# Patient Record
Sex: MALE | Race: WHITE | Employment: OTHER | URBAN - METROPOLITAN AREA
[De-identification: names, ages, dates, MRNs, and addresses within clinical notes are randomized per-mention and may not be internally consistent; named-entity substitution may affect disease eponyms.]

---

## 2017-04-14 ENCOUNTER — TRANSFERRED RECORDS (OUTPATIENT)
Dept: HEALTH INFORMATION MANAGEMENT | Facility: CLINIC | Age: 57
End: 2017-04-14

## 2017-04-20 ENCOUNTER — MEDICAL CORRESPONDENCE (OUTPATIENT)
Dept: HEALTH INFORMATION MANAGEMENT | Facility: CLINIC | Age: 57
End: 2017-04-20

## 2017-04-20 ENCOUNTER — TRANSFERRED RECORDS (OUTPATIENT)
Dept: HEALTH INFORMATION MANAGEMENT | Facility: CLINIC | Age: 57
End: 2017-04-20

## 2017-04-25 DIAGNOSIS — C15.5 CANCER OF DISTAL THIRD OF ESOPHAGUS (H): Primary | ICD-10-CM

## 2017-04-25 PROCEDURE — 00000346 ZZHCL STATISTIC REVIEW OUTSIDE SLIDES TC 88321: Performed by: INTERNAL MEDICINE

## 2017-04-25 PROCEDURE — 88377 M/PHMTRC ALYS ISHQUANT/SEMIQ: CPT | Performed by: INTERNAL MEDICINE

## 2017-04-25 PROCEDURE — 00000159 ZZHCL STATISTIC H-SEND OUTS PREP: Performed by: INTERNAL MEDICINE

## 2017-04-25 PROCEDURE — 00000158 ZZHCL STATISTIC H-FISH PROCESS B/S: Performed by: INTERNAL MEDICINE

## 2017-04-25 PROCEDURE — 88341 IMHCHEM/IMCYTCHM EA ADD ANTB: CPT | Performed by: INTERNAL MEDICINE

## 2017-04-25 PROCEDURE — 88313 SPECIAL STAINS GROUP 2: CPT | Performed by: INTERNAL MEDICINE

## 2017-04-25 PROCEDURE — 88342 IMHCHEM/IMCYTCHM 1ST ANTB: CPT | Performed by: INTERNAL MEDICINE

## 2017-04-26 ASSESSMENT — ENCOUNTER SYMPTOMS
ORTHOPNEA: 0
LEG PAIN: 0
DISTURBANCES IN COORDINATION: 0
MUSCLE WEAKNESS: 1
MEMORY LOSS: 0
LIGHT-HEADEDNESS: 0
NAUSEA: 1
HYPERTENSION: 0
DIARRHEA: 1
PALPITATIONS: 1
SMELL DISTURBANCE: 0
BOWEL INCONTINENCE: 0
SINUS PAIN: 0
HEADACHES: 0
SLEEP DISTURBANCES DUE TO BREATHING: 0
SORE THROAT: 0
RECTAL PAIN: 0
HYPOTENSION: 0
NUMBNESS: 1
JAUNDICE: 0
JOINT SWELLING: 0
SINUS CONGESTION: 0
WEAKNESS: 0
LEG SWELLING: 1
HOARSE VOICE: 0
CLAUDICATION: 0
SEIZURES: 0
ARTHRALGIAS: 0
RECTAL BLEEDING: 0
MUSCLE CRAMPS: 0
NECK MASS: 0
NECK PAIN: 0
STIFFNESS: 1
DIZZINESS: 0
SPEECH CHANGE: 0
TACHYCARDIA: 0
HEARTBURN: 0
BLOOD IN STOOL: 0
CONSTIPATION: 0
MYALGIAS: 1
ABDOMINAL PAIN: 0
PARALYSIS: 0
TREMORS: 0
EXERCISE INTOLERANCE: 0
TINGLING: 1
TROUBLE SWALLOWING: 1
BACK PAIN: 1
TASTE DISTURBANCE: 0
BLOATING: 0
SYNCOPE: 0
LOSS OF CONSCIOUSNESS: 0
VOMITING: 1

## 2017-04-27 ENCOUNTER — HOSPITAL ENCOUNTER (OUTPATIENT)
Dept: PET IMAGING | Facility: CLINIC | Age: 57
End: 2017-04-27
Attending: INTERNAL MEDICINE
Payer: COMMERCIAL

## 2017-04-27 ENCOUNTER — HOSPITAL ENCOUNTER (OUTPATIENT)
Dept: PET IMAGING | Facility: CLINIC | Age: 57
Discharge: HOME | End: 2017-04-27
Attending: INTERNAL MEDICINE | Admitting: INTERNAL MEDICINE
Payer: COMMERCIAL

## 2017-04-27 ENCOUNTER — ONCOLOGY VISIT (OUTPATIENT)
Dept: ONCOLOGY | Facility: CLINIC | Age: 57
End: 2017-04-27
Attending: INTERNAL MEDICINE
Payer: COMMERCIAL

## 2017-04-27 VITALS
SYSTOLIC BLOOD PRESSURE: 123 MMHG | RESPIRATION RATE: 18 BRPM | WEIGHT: 315 LBS | TEMPERATURE: 97.3 F | OXYGEN SATURATION: 97 % | BODY MASS INDEX: 36.45 KG/M2 | HEART RATE: 70 BPM | HEIGHT: 78 IN | DIASTOLIC BLOOD PRESSURE: 86 MMHG

## 2017-04-27 DIAGNOSIS — C15.5 CANCER OF DISTAL THIRD OF ESOPHAGUS (H): ICD-10-CM

## 2017-04-27 DIAGNOSIS — C77.0 SECONDARY MALIGNANT NEOPLASM OF CERVICAL LYMPH NODE (H): ICD-10-CM

## 2017-04-27 DIAGNOSIS — C15.5 CANCER OF DISTAL THIRD OF ESOPHAGUS (H): Primary | ICD-10-CM

## 2017-04-27 LAB
ALBUMIN SERPL-MCNC: 3.5 G/DL (ref 3.4–5)
ALP SERPL-CCNC: 114 U/L (ref 40–150)
ALT SERPL W P-5'-P-CCNC: 26 U/L (ref 0–70)
ANION GAP SERPL CALCULATED.3IONS-SCNC: 7 MMOL/L (ref 3–14)
AST SERPL W P-5'-P-CCNC: 23 U/L (ref 0–45)
BASOPHILS # BLD AUTO: 0 10E9/L (ref 0–0.2)
BASOPHILS NFR BLD AUTO: 0.4 %
BILIRUB SERPL-MCNC: 0.6 MG/DL (ref 0.2–1.3)
BUN SERPL-MCNC: 11 MG/DL (ref 7–30)
CALCIUM SERPL-MCNC: 9 MG/DL (ref 8.5–10.1)
CHLORIDE SERPL-SCNC: 108 MMOL/L (ref 94–109)
CO2 SERPL-SCNC: 25 MMOL/L (ref 20–32)
CREAT SERPL-MCNC: 0.76 MG/DL (ref 0.66–1.25)
DIFFERENTIAL METHOD BLD: NORMAL
EOSINOPHIL # BLD AUTO: 0.1 10E9/L (ref 0–0.7)
EOSINOPHIL NFR BLD AUTO: 1.6 %
ERYTHROCYTE [DISTWIDTH] IN BLOOD BY AUTOMATED COUNT: 12.4 % (ref 10–15)
GFR SERPL CREATININE-BSD FRML MDRD: NORMAL ML/MIN/1.7M2
GLUCOSE BLDC GLUCOMTR-MCNC: 118 MG/DL (ref 70–99)
GLUCOSE SERPL-MCNC: 94 MG/DL (ref 70–99)
HCT VFR BLD AUTO: 44.8 % (ref 40–53)
HGB BLD-MCNC: 14.8 G/DL (ref 13.3–17.7)
IMM GRANULOCYTES # BLD: 0 10E9/L (ref 0–0.4)
IMM GRANULOCYTES NFR BLD: 0.4 %
LYMPHOCYTES # BLD AUTO: 1.3 10E9/L (ref 0.8–5.3)
LYMPHOCYTES NFR BLD AUTO: 22.8 %
MCH RBC QN AUTO: 28.7 PG (ref 26.5–33)
MCHC RBC AUTO-ENTMCNC: 33 G/DL (ref 31.5–36.5)
MCV RBC AUTO: 87 FL (ref 78–100)
MONOCYTES # BLD AUTO: 0.4 10E9/L (ref 0–1.3)
MONOCYTES NFR BLD AUTO: 6.9 %
NEUTROPHILS # BLD AUTO: 3.7 10E9/L (ref 1.6–8.3)
NEUTROPHILS NFR BLD AUTO: 67.9 %
NRBC # BLD AUTO: 0 10*3/UL
NRBC BLD AUTO-RTO: 0 /100
PLATELET # BLD AUTO: 236 10E9/L (ref 150–450)
POTASSIUM SERPL-SCNC: 4.4 MMOL/L (ref 3.4–5.3)
PROT SERPL-MCNC: 7.8 G/DL (ref 6.8–8.8)
RBC # BLD AUTO: 5.15 10E12/L (ref 4.4–5.9)
SODIUM SERPL-SCNC: 140 MMOL/L (ref 133–144)
WBC # BLD AUTO: 5.5 10E9/L (ref 4–11)

## 2017-04-27 PROCEDURE — 99205 OFFICE O/P NEW HI 60 MIN: CPT | Mod: ZP | Performed by: INTERNAL MEDICINE

## 2017-04-27 PROCEDURE — 70491 CT SOFT TISSUE NECK W/DYE: CPT

## 2017-04-27 PROCEDURE — 34300033 ZZH RX 343: Performed by: INTERNAL MEDICINE

## 2017-04-27 PROCEDURE — 80053 COMPREHEN METABOLIC PANEL: CPT | Performed by: INTERNAL MEDICINE

## 2017-04-27 PROCEDURE — 71260 CT THORAX DX C+: CPT

## 2017-04-27 PROCEDURE — A9552 F18 FDG: HCPCS | Performed by: INTERNAL MEDICINE

## 2017-04-27 PROCEDURE — 99212 OFFICE O/P EST SF 10 MIN: CPT | Mod: ZF,25

## 2017-04-27 PROCEDURE — 78816 PET IMAGE W/CT FULL BODY: CPT | Mod: PI

## 2017-04-27 PROCEDURE — 36415 COLL VENOUS BLD VENIPUNCTURE: CPT

## 2017-04-27 PROCEDURE — 82962 GLUCOSE BLOOD TEST: CPT

## 2017-04-27 PROCEDURE — 85025 COMPLETE CBC W/AUTO DIFF WBC: CPT | Performed by: INTERNAL MEDICINE

## 2017-04-27 PROCEDURE — 25500064 ZZH RX 255 OP 636: Performed by: INTERNAL MEDICINE

## 2017-04-27 RX ORDER — LATANOPROST 50 UG/ML
SOLUTION/ DROPS OPHTHALMIC AT BEDTIME
COMMUNITY
Start: 2015-05-30 | End: 2017-11-28

## 2017-04-27 RX ORDER — TIMOLOL MALEATE 5 MG/ML
SOLUTION/ DROPS OPHTHALMIC DAILY
COMMUNITY
Start: 2017-02-15 | End: 2021-08-12

## 2017-04-27 RX ORDER — IOPAMIDOL 755 MG/ML
10-140 INJECTION, SOLUTION INTRAVASCULAR ONCE
Status: COMPLETED | OUTPATIENT
Start: 2017-04-27 | End: 2017-04-27

## 2017-04-27 RX ADMIN — IOPAMIDOL 135 ML: 755 INJECTION, SOLUTION INTRAVENOUS at 08:26

## 2017-04-27 RX ADMIN — FLUDEOXYGLUCOSE F-18 17.96 MCI.: 500 INJECTION, SOLUTION INTRAVENOUS at 07:28

## 2017-04-27 ASSESSMENT — PAIN SCALES - GENERAL: PAINLEVEL: MILD PAIN (2)

## 2017-04-27 NOTE — MR AVS SNAPSHOT
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  Call member services at your health plan with any benefit or coverage questions.      Please bring the following with you to your appointment:    (1) Any X-Rays, CTs or MRIs which have been performed.  Contact the facility where they were done to arrange for  prior to your scheduled appointment.   (2) List of current medications  (3) This referral request   (4) Any documents/labs given to you for this referral                  Your next 10 appointments already scheduled     May 04, 2017  3:00 PM CDT   (Arrive by 2:45 PM)   Return Visit with MD JIMBO [facility] ([facility])    [address] [place] [id number] [phone]              Future tests that were ordered for you today     Open Future Orders        Priority Expected Expires Ordered    US Biopsy FNA Lymph Node ASAP  4/27/2018 4/27/2017    CT Soft Tissue Neck w Contrast Routine  4/26/2018 4/26/2017            Who to contact     If you have questions or need follow up information about today's clinic  "visit or your schedule please contact [facility] directly at [phone].  Normal or non-critical lab and imaging results will be communicated to you by MyChart, letter or phone within 4 business days after the clinic has received the results. If you do not hear from us within 7 days, please contact the clinic through [employer] or phone. If you have a critical or abnormal lab result, we will notify you by phone as soon as possible.  Submit refill requests through [employer] or call your pharmacy and they will forward the refill request to us. Please allow 3 business days for your refill to be completed.          Additional Information About Your Visit        [employer] Information     [employer] gives you secure access to your electronic health record. If you see a primary care provider, you can also send messages to your care team and make appointments. If you have questions, please call your primary care clinic.  If you do not have a primary care provider, please call [phone] and they will assist you.        Care EveryWhere ID     This is your Care EveryWhere ID. This could be used by other organizations to access your [place] medical records  [id number]        Your Vitals Were     Pulse Temperature Respirations Height Pulse Oximetry BMI (Body Mass Index)    70 97.3  F (36.3  C) (Tympanic) 18 1.981 m (6' 6\") 97% 43.1 kg/m2       Blood Pressure from Last 3 Encounters:   04/27/17 123/86    Weight from Last 3 Encounters:   04/27/17 (!) 169.2 kg (373 lb)              We Performed the Following     OTOLARYNGOLOGY REFERRAL        Primary Care Provider    Unknown Primary MD [place]       No address on file        Thank you!     Thank you for choosing [facility]  for your care. Our goal is always to provide you with excellent care. Hearing back from our patients is one way we can continue to improve our services. Please take a few minutes to complete the written survey that you may " receive in the mail after your visit with us. Thank you!             Your Updated Medication List - Protect others around you: Learn how to safely use, store and throw away your medicines at www.disposemymeds.org.          This list is accurate as of: 4/27/17  4:25 PM.  Always use your most recent med list.                   Brand Name Dispense Instructions for use    latanoprost 0.005 % ophthalmic solution    XALATAN     Place into both eyes At Bedtime       timolol 0.5 % ophthalmic solution    TIMOPTIC     Place into both eyes daily

## 2017-04-27 NOTE — PROGRESS NOTES
Oncology Initial visit:  Date on this visit: 4/27/2017    Reuben Padilla  is referred by Dr.Wayne JAVED Stevens for an oncology consultation. 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  He tells me that starting in February he started noticing dysphagia mainly for solids and it is slowly worsening.  He has noticed that he has to gulp water down to get anything down.  During this time, he has had occasional nausea and vomiting and occasional loose stools but denies any constipation.  He has lost about 10 pounds during this time.  Otherwise, he thinks that his energy has continued to be good.  Occasionally he has noticed that he feels an annoying feeling in his chest, especially after eating.  He denies that that is pain or heartburn.  He has not noticed any new infections, although in 12/2016 he had a right toe bunion surgery and after that it became infected and he had to be on antibiotics for 6 weeks for osteomyelitis and then his right toe was amputated in early February.  Apart from that, he denies any other infections.  He denies any difficulty breathing.  He has no bleeding.  He has not noticed any new lumps, bumps or any swellings anywhere.  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  He denies any hearing problems or tinnitus.  He denies any other focal neurological problems.  He has no new skin problems.  He had workup for dysphagia.  He had an EGD as well as colonoscopy done.  The EGD showed just above the GE junction there was an ulcerated area worrisome for malignant neoplasm.  The lesion itself is only 1-2 cm long and begins 40 cm from the lips.  There was no evidence of esophagitis.  The biopsy of  the esophageal lesion showed invasive poorly differentiated squamous cell carcinoma.  There was also mild chronic gastritis present in the gastric antral mucosa.  It was negative for H. pylori.  The colonoscopy was essentially unremarkable except 2 polyps were removed from the descending colon which were tubular adenomas and a single polyp was removed from the sigmoid colon which was also a tubular adenoma.  Because of the finding of the esophageal cancer, he was referred to the [facility].  The patient is originally from [place].         ROS:  A comprehensive ROS was otherwise neg      Past Medical/Surgical History:  No past medical history on file.  No past surgical history on file.  He has a history of glaucoma and obesity.  He had right big toe bunion surgery which resulted in osteomyelitis and then he had a right toe amputation in 02/2017.  He has a history of cholecystectomy in the 1990s and right arthroscopy in the past.         Allergies:  Allergies as of 04/27/2017 - In Progress 04/27/2017   Allergen Reaction Noted     Penicillin g Other (See Comments) 01/19/2017     Penicillins Unknown 04/27/2017     Current Medications:  Current Outpatient Prescriptions   Medication Sig Dispense Refill     latanoprost (XALATAN) 0.005 % ophthalmic solution Place into both eyes At Bedtime       timolol (TIMOPTIC) 0.5 % ophthalmic solution Place into both eyes daily        Family History:  No family history on file.   Paternal grandmother had breast cancer.  Paternal aunt had some gynecologic cancer.  He has one son who is healthy.         Social History:  Social History     Social History     Marital status: Single     Spouse name: N/A     Number of children: N/A     Years of education: N/A     Occupational History     Not on file.     Social History Main Topics     Smoking status: Never Smoker     Smokeless tobacco: Not on file     Alcohol use Not on file     Drug use: Not on file     Sexual activity: Not  "on file     Other Topics Concern     Not on file     Social History Narrative     No narrative on file   He used to smoke and has a 20-pack-year smoking history, but he quit 10 years ago.  He drinks alcohol occasionally.  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  He lives alone.       Physical Exam:  /86 (BP Location: Right arm)  Pulse 70  Temp 97.3  F (36.3  C) (Tympanic)  Resp 18  Ht 1.981 m (6' 6\")  Wt (!) 169.2 kg (373 lb)  SpO2 97%  BMI 43.1 kg/m2  CONSTITUTIONAL: no acute distress  EYES: PERRLA, no palor or icterus.   ENT/MOUTH: no mouth lesions. Oropharynx normal  CVS: s1s2 no m r g .   RESPIRATORY: clear to auscultation b/l  GI: soft non tender no hepatosplenomegaly  NEURO: AAOX3  Grossly non focal neuro exam  INTEGUMENT: no obvious rashes  LYMPHATIC: I did notice minimal fullness in the anterior cervical triangle but no discrete LAD noted. no palpable supraclavicular, axillary or inguinal LAD.   MUSCULOSKELETAL: Unremarkable. No bony tenderness.   EXTREMITIES: no edema. Amputated right big toe  PSYCH: Mentation, mood and affect are normal. Decision making capacity is intact    Laboratory/Imaging Studies  Results for orders placed or performed in visit on 04/27/17   *CBC with platelets differential   Result Value Ref Range    WBC 5.5 4.0 - 11.0 10e9/L    RBC Count 5.15 4.4 - 5.9 10e12/L    Hemoglobin 14.8 13.3 - 17.7 g/dL    Hematocrit 44.8 40.0 - 53.0 %    MCV 87 78 - 100 fl    MCH 28.7 26.5 - 33.0 pg    MCHC 33.0 31.5 - 36.5 g/dL    RDW 12.4 10.0 - 15.0 %    Platelet Count 236 150 - 450 10e9/L    Diff Method Automated Method     % Neutrophils 67.9 %    % Lymphocytes 22.8 %    % Monocytes 6.9 %    % Eosinophils 1.6 %    % Basophils 0.4 %    % Immature Granulocytes 0.4 %    Nucleated RBCs 0 0 /100    Absolute Neutrophil 3.7 1.6 - 8.3 10e9/L    Absolute Lymphocytes 1.3 0.8 - 5.3 10e9/L    Absolute Monocytes 0.4 0.0 - 1.3 10e9/L    Absolute Eosinophils 0.1 0.0 - 0.7 10e9/L "    Absolute Basophils 0.0 0.0 - 0.2 10e9/L    Abs Immature Granulocytes 0.0 0 - 0.4 10e9/L    Absolute Nucleated RBC 0.0    Comprehensive metabolic panel   Result Value Ref Range    Sodium 140 133 - 144 mmol/L    Potassium 4.4 3.4 - 5.3 mmol/L    Chloride 108 94 - 109 mmol/L    Carbon Dioxide 25 20 - 32 mmol/L    Anion Gap 7 3 - 14 mmol/L    Glucose 94 70 - 99 mg/dL    Urea Nitrogen 11 7 - 30 mg/dL    Creatinine 0.76 0.66 - 1.25 mg/dL    GFR Estimate >90  Non  GFR Calc   >60 mL/min/1.7m2    GFR Estimate If Black >90   GFR Calc   >60 mL/min/1.7m2    Calcium 9.0 8.5 - 10.1 mg/dL    Bilirubin Total 0.6 0.2 - 1.3 mg/dL    Albumin 3.5 3.4 - 5.0 g/dL    Protein Total 7.8 6.8 - 8.8 g/dL    Alkaline Phosphatase 114 40 - 150 U/L    ALT 26 0 - 70 U/L    AST 23 0 - 45 U/L       Imaging reviewed my self  PET/CT which was done today is very concerning for abnormally enlarged, hypermetabolic, cervical, mediastinal and retroperitoneal lymph nodes as well as multiple hepatic lesions which are also hypermetabolic.  This is all consistent with metastatic disease.  This is preliminary report.  Final report is pending at this time.         Path report reviewed    ASSESSMENT/PLAN:    Reuben is a 56-year-old male who started complaining of dysphagia in February and the workup showed an ulcerated, malignant ulcer about 1-2 cm in length 40 cm from the lip in the distal part of the esophagus.  It was invasive, poorly differentiated squamous cell carcinoma.  His PET scan is very concerning for metastatic disease involving multiple cervical, mediastinal and retroperitoneal lymph nodes.  He also has multiple hepatic hypermetabolic lesions consistent with metastatic disease.  I discussed with him in detail about this.  I told him that my concern is that he has metastatic disease which would be incurable.  At this time, it is hard for me to say whether he has primary esophageal cancer or if this started from  somewhere in the head and neck region.  I would like to do further investigation in terms of confirming the metastatic disease by doing a biopsy of a cervical lymph node.  I would also like him to be seen by ENT to do a proper exam to see if we can find any primary focus in the head and neck region.  I did also like his biopsy to be tested for HPV infection.  I would also like the biopsy to be tested for p16.  I did discuss with him that because of my concern for metastatic disease he would not be a surgical candidate nor would radiation be useful in his case and the treatment options would not be curative, but palliative, in nature and would consist of palliative chemotherapy.  At this time, he would like to get the biopsy done and then discuss the details about chemotherapy later.  I will try to schedule his biopsy as soon as possible and see him back in 1 week to go over the results and discuss further therapy options.       I also discussed with him the importance of maintaining good nutrition during this time.        We also discussed the importance of doing regular exercise to maintain general health.  This will also help him tolerate any treatments that we would offer him.       All of his and his family's questions were answered to their satisfaction.  Obviously this was not the news that they wanted to hear, but they understand the situation and they were very appreciative of our discussion.  He has our contact information so that if he has any questions or concerns in the interim he will give me a call.     Clemencia Carreon

## 2017-04-27 NOTE — NURSING NOTE
Chief Complaint   Patient presents with     Blood Draw     Labs only     Vitals and labs done peripherally.  See doc flow sheets for details.  Mercedes Mcfarland CMA

## 2017-04-27 NOTE — LETTER
4/27/2017       RE: Reuben Padilla  [address]   [facility] [address]     Dear Colleague,    Thank you for referring your patient, Reuben Padilla, to the [facility]. Please see a copy of my visit note below.    Oncology Initial visit:  Date on this visit: 4/27/2017    Reuben Padilla  is referred by Dr.Wayne JAVED Stevens for an oncology consultation. 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  He tells me that starting in February he started noticing dysphagia mainly for solids and it is slowly worsening.  He has noticed that he has to gulp water down to get anything down.  During this time, he has had occasional nausea and vomiting and occasional loose stools but denies any constipation.  He has lost about 10 pounds during this time.  Otherwise, he thinks that his energy has continued to be good.  Occasionally he has noticed that he feels an annoying feeling in his chest, especially after eating.  He denies that that is pain or heartburn.  He has not noticed any new infections, although in 12/2016 he had a right toe bunion surgery and after that it became infected and he had to be on antibiotics for 6 weeks for osteomyelitis and then his right toe was amputated in early February.  Apart from that, he denies any other infections.  He denies any difficulty breathing.  He has no bleeding.  He has not noticed any new lumps, bumps or any swellings anywhere.  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  He denies any hearing problems or tinnitus.  He denies any other focal neurological problems.  He has no new skin problems.  He had workup for dysphagia.  He had an EGD as well as colonoscopy  done.  The EGD showed just above the GE junction there was an ulcerated area worrisome for malignant neoplasm.  The lesion itself is only 1-2 cm long and begins 40 cm from the lips.  There was no evidence of esophagitis.  The biopsy of the esophageal lesion showed invasive poorly differentiated squamous cell carcinoma.  There was also mild chronic gastritis present in the gastric antral mucosa.  It was negative for H. pylori.  The colonoscopy was essentially unremarkable except 2 polyps were removed from the descending colon which were tubular adenomas and a single polyp was removed from the sigmoid colon which was also a tubular adenoma.  Because of the finding of the esophageal cancer, he was referred to the [facility].  The patient is originally from [place].         ROS:  A comprehensive ROS was otherwise neg      Past Medical/Surgical History:  No past medical history on file.  No past surgical history on file.  He has a history of glaucoma and obesity.  He had right big toe bunion surgery which resulted in osteomyelitis and then he had a right toe amputation in 02/2017.  He has a history of cholecystectomy in the 1990s and right arthroscopy in the past.         Allergies:  Allergies as of 04/27/2017 - In Progress 04/27/2017   Allergen Reaction Noted     Penicillin g Other (See Comments) 01/19/2017     Penicillins Unknown 04/27/2017     Current Medications:  Current Outpatient Prescriptions   Medication Sig Dispense Refill     latanoprost (XALATAN) 0.005 % ophthalmic solution Place into both eyes At Bedtime       timolol (TIMOPTIC) 0.5 % ophthalmic solution Place into both eyes daily        Family History:  No family history on file.   Paternal grandmother had breast cancer.  Paternal aunt had some gynecologic cancer.  He has one son who is healthy.         Social History:  Social History     Social History     Marital status: Single     Spouse name: N/A     Number of children: N/A     Years  "of education: N/A     Occupational History     Not on file.     Social History Main Topics     Smoking status: Never Smoker     Smokeless tobacco: Not on file     Alcohol use Not on file     Drug use: Not on file     Sexual activity: Not on file     Other Topics Concern     Not on file     Social History Narrative     No narrative on file   He used to smoke and has a 20-pack-year smoking history, but he quit 10 years ago.  He drinks alcohol occasionally.  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  He lives alone.       Physical Exam:  /86 (BP Location: Right arm)  Pulse 70  Temp 97.3  F (36.3  C) (Tympanic)  Resp 18  Ht 1.981 m (6' 6\")  Wt (!) 169.2 kg (373 lb)  SpO2 97%  BMI 43.1 kg/m2  CONSTITUTIONAL: no acute distress  EYES: PERRLA, no palor or icterus.   ENT/MOUTH: no mouth lesions. Oropharynx normal  CVS: s1s2 no m r g .   RESPIRATORY: clear to auscultation b/l  GI: soft non tender no hepatosplenomegaly  NEURO: AAOX3  Grossly non focal neuro exam  INTEGUMENT: no obvious rashes  LYMPHATIC: I did notice minimal fullness in the anterior cervical triangle but no discrete LAD noted. no palpable supraclavicular, axillary or inguinal LAD.   MUSCULOSKELETAL: Unremarkable. No bony tenderness.   EXTREMITIES: no edema. Amputated right big toe  PSYCH: Mentation, mood and affect are normal. Decision making capacity is intact    Laboratory/Imaging Studies  Results for orders placed or performed in visit on 04/27/17   *CBC with platelets differential   Result Value Ref Range    WBC 5.5 4.0 - 11.0 10e9/L    RBC Count 5.15 4.4 - 5.9 10e12/L    Hemoglobin 14.8 13.3 - 17.7 g/dL    Hematocrit 44.8 40.0 - 53.0 %    MCV 87 78 - 100 fl    MCH 28.7 26.5 - 33.0 pg    MCHC 33.0 31.5 - 36.5 g/dL    RDW 12.4 10.0 - 15.0 %    Platelet Count 236 150 - 450 10e9/L    Diff Method Automated Method     % Neutrophils 67.9 %    % Lymphocytes 22.8 %    % Monocytes 6.9 %    % Eosinophils 1.6 %    % Basophils 0.4 " %    % Immature Granulocytes 0.4 %    Nucleated RBCs 0 0 /100    Absolute Neutrophil 3.7 1.6 - 8.3 10e9/L    Absolute Lymphocytes 1.3 0.8 - 5.3 10e9/L    Absolute Monocytes 0.4 0.0 - 1.3 10e9/L    Absolute Eosinophils 0.1 0.0 - 0.7 10e9/L    Absolute Basophils 0.0 0.0 - 0.2 10e9/L    Abs Immature Granulocytes 0.0 0 - 0.4 10e9/L    Absolute Nucleated RBC 0.0    Comprehensive metabolic panel   Result Value Ref Range    Sodium 140 133 - 144 mmol/L    Potassium 4.4 3.4 - 5.3 mmol/L    Chloride 108 94 - 109 mmol/L    Carbon Dioxide 25 20 - 32 mmol/L    Anion Gap 7 3 - 14 mmol/L    Glucose 94 70 - 99 mg/dL    Urea Nitrogen 11 7 - 30 mg/dL    Creatinine 0.76 0.66 - 1.25 mg/dL    GFR Estimate >90  Non  GFR Calc   >60 mL/min/1.7m2    GFR Estimate If Black >90   GFR Calc   >60 mL/min/1.7m2    Calcium 9.0 8.5 - 10.1 mg/dL    Bilirubin Total 0.6 0.2 - 1.3 mg/dL    Albumin 3.5 3.4 - 5.0 g/dL    Protein Total 7.8 6.8 - 8.8 g/dL    Alkaline Phosphatase 114 40 - 150 U/L    ALT 26 0 - 70 U/L    AST 23 0 - 45 U/L       Imaging reviewed my self  PET/CT which was done today is very concerning for abnormally enlarged, hypermetabolic, cervical, mediastinal and retroperitoneal lymph nodes as well as multiple hepatic lesions which are also hypermetabolic.  This is all consistent with metastatic disease.  This is preliminary report.  Final report is pending at this time.         Path report reviewed    ASSESSMENT/PLAN:    Reuben is a 56-year-old male who started complaining of dysphagia in February and the workup showed an ulcerated, malignant ulcer about 1-2 cm in length 40 cm from the lip in the distal part of the esophagus.  It was invasive, poorly differentiated squamous cell carcinoma.  His PET scan is very concerning for metastatic disease involving multiple cervical, mediastinal and retroperitoneal lymph nodes.  He also has multiple hepatic hypermetabolic lesions consistent with metastatic disease.  I  discussed with him in detail about this.  I told him that my concern is that he has metastatic disease which would be incurable.  At this time, it is hard for me to say whether he has primary esophageal cancer or if this started from somewhere in the head and neck region.  I would like to do further investigation in terms of confirming the metastatic disease by doing a biopsy of a cervical lymph node.  I would also like him to be seen by ENT to do a proper exam to see if we can find any primary focus in the head and neck region.  I did also like his biopsy to be tested for HPV infection.  I would also like the biopsy to be tested for p16.  I did discuss with him that because of my concern for metastatic disease he would not be a surgical candidate nor would radiation be useful in his case and the treatment options would not be curative, but palliative, in nature and would consist of palliative chemotherapy.  At this time, he would like to get the biopsy done and then discuss the details about chemotherapy later.  I will try to schedule his biopsy as soon as possible and see him back in 1 week to go over the results and discuss further therapy options.       I also discussed with him the importance of maintaining good nutrition during this time.        We also discussed the importance of doing regular exercise to maintain general health.  This will also help him tolerate any treatments that we would offer him.       All of his and his family's questions were answered to their satisfaction.  Obviously this was not the news that they wanted to hear, but they understand the situation and they were very appreciative of our discussion.  He has our contact information so that if he has any questions or concerns in the interim he will give me a call.     Clemencia Carreon            Again, thank you for allowing me to participate in the care of your patient.      Sincerely,    Clemencia Carreon MD

## 2017-04-27 NOTE — LETTER
Date:May 3, 2017      Patient was self referred, no letter generated. Do not send.        [facility] Health Information

## 2017-04-27 NOTE — PATIENT INSTRUCTIONS
Please schedule ENT referral ASAP  Please schedule Biopsy of the lymph node asap    See me back in 1 week

## 2017-04-27 NOTE — NURSING NOTE
"Oncology Rooming Note    April 27, 2017 2:04 PM   Reuben Padilla is a 52 year old male who presents for: Oncology Clinic Visit    Initial Vitals: /86 (BP Location: Right arm)  Pulse 70  Temp 97.3  F (36.3  C) (Tympanic)  Resp 18  Ht 1.981 m (6' 6\")  Wt (!) 169.2 kg (373 lb)  SpO2 97%  BMI 43.1 kg/m2 Estimated body mass index is 43.1 kg/(m^2) as calculated from the following:    Height as of this encounter: 1.981 m (6' 6\").    Weight as of this encounter: 169.2 kg (373 lb). Body surface area is 3.05 meters squared.  Mild Pain (2) Comment: Data Unavailable   No LMP for male patient.  Allergies reviewed: Yes  Medications reviewed: Yes    Medications: Medication refills not needed today.  Pharmacy name entered into EPIC: Data Unavailable    Clinical concerns: provider was notified.    7 minutes for nursing intake (face to face time)     Anat Ortega CMA                "

## 2017-04-28 ENCOUNTER — TELEPHONE (OUTPATIENT)
Dept: NURSING | Facility: CLINIC | Age: 57
End: 2017-04-28

## 2017-04-28 ENCOUNTER — TELEPHONE (OUTPATIENT)
Dept: INTERVENTIONAL RADIOLOGY/VASCULAR | Facility: CLINIC | Age: 57
End: 2017-04-28

## 2017-04-28 ASSESSMENT — ENCOUNTER SYMPTOMS
NUMBNESS: 1
NAUSEA: 1
JOINT SWELLING: 0
SINUS CONGESTION: 0
JAUNDICE: 0
SINUS PAIN: 0
TINGLING: 0
MEMORY LOSS: 0
DISTURBANCES IN COORDINATION: 0
NECK PAIN: 0
HEADACHES: 1
DIZZINESS: 0
MYALGIAS: 1
TASTE DISTURBANCE: 0
HOARSE VOICE: 0
ABDOMINAL PAIN: 0
STIFFNESS: 0
LOSS OF CONSCIOUSNESS: 0
RECTAL BLEEDING: 0
SPEECH CHANGE: 0
RECTAL PAIN: 0
MUSCLE CRAMPS: 1
SEIZURES: 0
MUSCLE WEAKNESS: 1
SMELL DISTURBANCE: 0
TROUBLE SWALLOWING: 1
CONSTIPATION: 0
BLOOD IN STOOL: 0
WEAKNESS: 0
PARALYSIS: 0
BOWEL INCONTINENCE: 0
SORE THROAT: 0
BLOATING: 0
TREMORS: 0
ARTHRALGIAS: 1
BACK PAIN: 0
VOMITING: 1
NECK MASS: 0
HEARTBURN: 0
DIARRHEA: 1

## 2017-04-28 NOTE — TELEPHONE ENCOUNTER
When is the appointment for imaging biopsy for his lymph nodes? Please call. No one called him about it.  Susana Enriquez RN-[facility]

## 2017-04-29 LAB — COPATH REPORT: NORMAL

## 2017-05-01 ENCOUNTER — APPOINTMENT (OUTPATIENT)
Dept: LAB | Facility: CLINIC | Age: 57
End: 2017-05-01
Attending: INTERNAL MEDICINE
Payer: COMMERCIAL

## 2017-05-02 ENCOUNTER — APPOINTMENT (OUTPATIENT)
Dept: MEDSURG UNIT | Facility: CLINIC | Age: 57
End: 2017-05-02
Attending: INTERNAL MEDICINE
Payer: COMMERCIAL

## 2017-05-02 ENCOUNTER — APPOINTMENT (OUTPATIENT)
Dept: INTERVENTIONAL RADIOLOGY/VASCULAR | Facility: CLINIC | Age: 57
End: 2017-05-02
Attending: INTERNAL MEDICINE
Payer: COMMERCIAL

## 2017-05-02 ENCOUNTER — HOSPITAL ENCOUNTER (OUTPATIENT)
Facility: CLINIC | Age: 57
Discharge: HOME | End: 2017-05-02
Attending: INTERNAL MEDICINE | Admitting: INTERNAL MEDICINE
Payer: COMMERCIAL

## 2017-05-02 VITALS
DIASTOLIC BLOOD PRESSURE: 60 MMHG | SYSTOLIC BLOOD PRESSURE: 100 MMHG | RESPIRATION RATE: 18 BRPM | TEMPERATURE: 97.9 F | HEART RATE: 90 BPM | OXYGEN SATURATION: 98 %

## 2017-05-02 DIAGNOSIS — C15.5 CANCER OF DISTAL THIRD OF ESOPHAGUS (H): ICD-10-CM

## 2017-05-02 DIAGNOSIS — C77.0 SECONDARY MALIGNANT NEOPLASM OF CERVICAL LYMPH NODE (H): ICD-10-CM

## 2017-05-02 LAB — INR BLD: 1 (ref 0.86–1.14)

## 2017-05-02 PROCEDURE — 27210903 ZZH KIT CR5

## 2017-05-02 PROCEDURE — 25000128 H RX IP 250 OP 636: Performed by: RADIOLOGY

## 2017-05-02 PROCEDURE — 25000128 H RX IP 250 OP 636: Performed by: INTERNAL MEDICINE

## 2017-05-02 PROCEDURE — 40000166 ZZH STATISTIC PP CARE STAGE 1

## 2017-05-02 PROCEDURE — 88173 CYTOPATH EVAL FNA REPORT: CPT | Performed by: INTERNAL MEDICINE

## 2017-05-02 PROCEDURE — 88342 IMHCHEM/IMCYTCHM 1ST ANTB: CPT | Performed by: INTERNAL MEDICINE

## 2017-05-02 PROCEDURE — 25000125 ZZHC RX 250: Performed by: STUDENT IN AN ORGANIZED HEALTH CARE EDUCATION/TRAINING PROGRAM

## 2017-05-02 PROCEDURE — 85610 PROTHROMBIN TIME: CPT | Mod: QW

## 2017-05-02 PROCEDURE — 25000128 H RX IP 250 OP 636: Performed by: STUDENT IN AN ORGANIZED HEALTH CARE EDUCATION/TRAINING PROGRAM

## 2017-05-02 PROCEDURE — 27210995 ZZH RX 272: Performed by: STUDENT IN AN ORGANIZED HEALTH CARE EDUCATION/TRAINING PROGRAM

## 2017-05-02 PROCEDURE — 88172 CYTP DX EVAL FNA 1ST EA SITE: CPT | Performed by: INTERNAL MEDICINE

## 2017-05-02 PROCEDURE — 88341 IMHCHEM/IMCYTCHM EA ADD ANTB: CPT | Performed by: INTERNAL MEDICINE

## 2017-05-02 PROCEDURE — 00000155 ZZHCL STATISTIC H-CELL BLOCK W/STAIN: Performed by: INTERNAL MEDICINE

## 2017-05-02 PROCEDURE — 27210732 IR LYMPH NODE BIOPSY

## 2017-05-02 PROCEDURE — 93005 ELECTROCARDIOGRAM TRACING: CPT

## 2017-05-02 PROCEDURE — 88305 TISSUE EXAM BY PATHOLOGIST: CPT | Performed by: INTERNAL MEDICINE

## 2017-05-02 RX ORDER — NALOXONE HYDROCHLORIDE 0.4 MG/ML
.1-.4 INJECTION, SOLUTION INTRAMUSCULAR; INTRAVENOUS; SUBCUTANEOUS
Status: DISCONTINUED | OUTPATIENT
Start: 2017-05-02 | End: 2017-05-02 | Stop reason: HOSPADM

## 2017-05-02 RX ORDER — LIDOCAINE 40 MG/G
CREAM TOPICAL
Status: DISCONTINUED | OUTPATIENT
Start: 2017-05-02 | End: 2017-05-02 | Stop reason: HOSPADM

## 2017-05-02 RX ORDER — SODIUM CHLORIDE 9 MG/ML
INJECTION, SOLUTION INTRAVENOUS CONTINUOUS
Status: DISCONTINUED | OUTPATIENT
Start: 2017-05-02 | End: 2017-05-02 | Stop reason: HOSPADM

## 2017-05-02 RX ORDER — MULTIPLE VITAMINS W/ MINERALS TAB 9MG-400MCG
1 TAB ORAL DAILY
COMMUNITY
End: 2021-06-07

## 2017-05-02 RX ORDER — FENTANYL CITRATE 50 UG/ML
25-50 INJECTION, SOLUTION INTRAMUSCULAR; INTRAVENOUS EVERY 5 MIN PRN
Status: DISCONTINUED | OUTPATIENT
Start: 2017-05-02 | End: 2017-05-02 | Stop reason: HOSPADM

## 2017-05-02 RX ORDER — FLUMAZENIL 0.1 MG/ML
0.2 INJECTION, SOLUTION INTRAVENOUS
Status: DISCONTINUED | OUTPATIENT
Start: 2017-05-02 | End: 2017-05-02 | Stop reason: HOSPADM

## 2017-05-02 RX ORDER — ONDANSETRON 2 MG/ML
4 INJECTION INTRAMUSCULAR; INTRAVENOUS ONCE
Status: COMPLETED | OUTPATIENT
Start: 2017-05-02 | End: 2017-05-02

## 2017-05-02 RX ORDER — CHLORAL HYDRATE 500 MG
2 CAPSULE ORAL DAILY
COMMUNITY
End: 2018-11-13

## 2017-05-02 RX ADMIN — FENTANYL CITRATE 50 MCG: 50 INJECTION, SOLUTION INTRAMUSCULAR; INTRAVENOUS at 11:35

## 2017-05-02 RX ADMIN — ONDANSETRON 4 MG: 2 INJECTION INTRAMUSCULAR; INTRAVENOUS at 11:44

## 2017-05-02 RX ADMIN — LIDOCAINE HYDROCHLORIDE 15 ML: 10 INJECTION, SOLUTION EPIDURAL; INFILTRATION; INTRACAUDAL; PERINEURAL at 11:34

## 2017-05-02 RX ADMIN — SODIUM CHLORIDE: 9 INJECTION, SOLUTION INTRAVENOUS at 09:49

## 2017-05-02 RX ADMIN — MIDAZOLAM 1 MG: 1 INJECTION INTRAMUSCULAR; INTRAVENOUS at 11:34

## 2017-05-02 NOTE — PROGRESS NOTES
0955 Pt on 2a prepped and ready for lymph node biopsy. PIV placed. INR 1.0 per ismaryan, H&P current. Family at . Pt ready for consent.

## 2017-05-02 NOTE — IP AVS SNAPSHOT
[address] [address]                                       After Visit Summary   5/2/2017    Reuben Padilla    MRN: [id number]           After Visit Summary Signature Page     I have received my discharge instructions, and my questions have been answered. I have discussed any challenges I see with this plan with the nurse or doctor.    ..........................................................................................................................................  Patient/Patient Representative Signature      ..........................................................................................................................................  Patient Representative Print Name and Relationship to Patient    ..................................................               ................................................  Date                                            Time    ..........................................................................................................................................  Reviewed by Signature/Title    ...................................................              ..............................................  Date                                                            Time

## 2017-05-02 NOTE — IP AVS SNAPSHOT
MRN:[id number]                      After Visit Summary   5/2/2017    Reuben Padilla    MRN: [id number]           Visit Information        Department      5/2/2017  9:12 AM [address] [facility]          Review of your medicines      UNREVIEWED medicines. Ask your doctor about these medicines        Dose / Directions    fish oil-omega-3 fatty acids 1000 MG capsule        Dose:  2 g   Take 2 g by mouth daily   Refills:  0       IBUPROFEN PO        Dose:  800 mg   Take 800 mg by mouth every 8 hours as needed for moderate pain   Refills:  0       latanoprost 0.005 % ophthalmic solution   Commonly known as:  XALATAN   Used for:  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)        Place into both eyes At Bedtime   Refills:  0       Multi-vitamin Tabs tablet        Dose:  1 tablet   Take 1 tablet by mouth daily   Refills:  0       timolol 0.5 % ophthalmic solution   Commonly known as:  TIMOPTIC   Used for:  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)        Place into both eyes daily   Refills:  0                Protect others around you: Learn how to safely use, store and throw away your medicines at www.disposemymeds.org.         Follow-ups after your visit        Your next 10 appointments already scheduled     May 08, 2017  8:00 AM CDT   (Arrive by 7:45 AM)   New Patient Visit with Kadi Dee MD   [facility] ([facility])    [address] [place] [id number] [phone]            May 12, 2017  4:00 PM CDT   (Arrive by 3:45 PM)   New Patient Visit with Duran Ackerman MD   [facility] ([facility])    [address] [place] [id number] [phone]               Care Instructions        Further instructions from your care team       [facility]    Interventional Radiology  Patient Instructions Following Biopsy    AFTER YOU GO HOME  ? If you were  given sedation DO NOT drive or operate machinery at home or at work for at least 24 hours  ? DO relax and take it easy for 48 hours, no strenuous activity for 24 hours  ? DO drink plenty of fluids  ? DO resume your regular diet, unless otherwise instructed by your Primary Physician  ? Keep the dressing dry and in place for 24 hours.  ? DO NOT SMOKE FOR AT LEAST 24 HOURS, if you have been given any medications that were to help you relax or sedate you during your procedure  ? DO NOT drink alcoholic beverages the day of your procedure  ? DO NOT do any strenuous exercise or lifting (> 10 lbs) for at least 3 days following your procedure  ? DO NOT take a bath or shower for at least 12 hours following your procedure  ? Remove dressing after shower the next day. Replace with Band aid for 2 days.  Never leave a wet dressing in place.  ? DO NOT make any important or legal decisions for 24 hours following your procedure  ? There should be minimum drainage from the biopsy site    CALL THE PHYSICIAN IF:  ? You start bleeding from the procedure site.  If you do start to bleed from that site, lie down flat and hold pressure on the site for a minimum of 10 minutes.  Your physician will tell you if you need to return to the hospital  ? You develop nausea or vomiting  ? You have excessive swelling, redness, or tenderness at the site  ? You have drainage that looks like it is infected.  ? You experience severe pain  ? You develop hives or a rash or unexplained itching  ? You develop shortness of breath  ? You develop a temperature of 101 degrees F or greater      [facility] INTERVENTIONAL RADIOLOGY DEPARTMENT  Procedure Physician:         Dr. Ashton                     Date of procedure: May 2, 2017  Telephone Numbers: [phone] Monday-Friday 8:00 am to 4:30 pm  [phone] After 4:30 pm Monday-Friday, Weekends & Holidays.   Ask for the Neuro-Radiologist on call.  Someone is on call 24 hrs/day  [facility] toll free  number: [phone] Monday-Friday 8:00 am to 4:30 pm  [facility] Emergency Dept: [phone]           Additional Information About Your Visit        [facility] Information     [employer] gives you secure access to your electronic health record. If you see a primary care provider, you can also send messages to your care team and make appointments. If you have questions, please call your primary care clinic.  If you do not have a primary care provider, please call [phone] and they will assist you.        Care EveryWhere ID     This is your Care EveryWhere ID. This could be used by other organizations to access your [facility] medical records  [id number]        Your Vitals Were     Blood Pressure Pulse Temperature Respirations Pulse Oximetry       99/68 91 97.9  F (36.6  C) (Oral) 14 96%        Primary Care Provider    Unknown Primary MD Sydney      Thank you!     Thank you for choosing [facility] for your care. Our goal is always to provide you with excellent care. Hearing back from our patients is one way we can continue to improve our services. Please take a few minutes to complete the written survey that you may receive in the mail after you visit with us. Thank you!             Medication List: This is a list of all your medications and when to take them. Check marks below indicate your daily home schedule. Keep this list as a reference.      Medications           Morning Afternoon Evening Bedtime As Needed    fish oil-omega-3 fatty acids 1000 MG capsule   Take 2 g by mouth daily                                IBUPROFEN PO   Take 800 mg by mouth every 8 hours as needed for moderate pain                                latanoprost 0.005 % ophthalmic solution   Commonly known as:  XALATAN   Place into both eyes At Bedtime                                Multi-vitamin Tabs tablet   Take 1 tablet by mouth daily                                timolol 0.5 % ophthalmic solution   Commonly known as:  TIMOPTIC   Place into both  eyes daily

## 2017-05-02 NOTE — DISCHARGE INSTRUCTIONS
[facility]    Interventional Radiology  Patient Instructions Following Biopsy    AFTER YOU GO HOME  ? If you were given sedation DO NOT drive or operate machinery at home or at work for at least 24 hours  ? DO relax and take it easy for 48 hours, no strenuous activity for 24 hours  ? DO drink plenty of fluids  ? DO resume your regular diet, unless otherwise instructed by your Primary Physician  ? Keep the dressing dry and in place for 24 hours.  ? DO NOT SMOKE FOR AT LEAST 24 HOURS, if you have been given any medications that were to help you relax or sedate you during your procedure  ? DO NOT drink alcoholic beverages the day of your procedure  ? DO NOT do any strenuous exercise or lifting (> 10 lbs) for at least 3 days following your procedure  ? DO NOT take a bath or shower for at least 12 hours following your procedure  ? Remove dressing after shower the next day. Replace with Band aid for 2 days.  Never leave a wet dressing in place.  ? DO NOT make any important or legal decisions for 24 hours following your procedure  ? There should be minimum drainage from the biopsy site    CALL THE PHYSICIAN IF:  ? You start bleeding from the procedure site.  If you do start to bleed from that site, lie down flat and hold pressure on the site for a minimum of 10 minutes.  Your physician will tell you if you need to return to the hospital  ? You develop nausea or vomiting  ? You have excessive swelling, redness, or tenderness at the site  ? You have drainage that looks like it is infected.  ? You experience severe pain  ? You develop hives or a rash or unexplained itching  ? You develop shortness of breath  ? You develop a temperature of 101 degrees F or greater      [facility] INTERVENTIONAL RADIOLOGY DEPARTMENT  Procedure Physician:         Dr. Ashton                     Date of procedure: May 2, 2017  Telephone Numbers: [phone] Monday-Friday 8:00 am to 4:30 pm  [phone] After 4:30 pm Monday-Friday,  Weekends & Holidays.   Ask for the Neuro-Radiologist on call.  Someone is on call 24 hrs/day  [facility] toll free number: [phone] Monday-Friday 8:00 am to 4:30 pm  [facility] Emergency Dept: [phone]

## 2017-05-02 NOTE — PROGRESS NOTES
1150 Pt arrived on 2a post lymph node biopsy. VSS RA. Dressing c/d/i. No pain. Family at BS. 1200 Pt eating and drinking.

## 2017-05-02 NOTE — PROGRESS NOTES
Interventional Radiology Intra-procedural Nursing Note    Patient Name: Reuben Padilla  Medical Record Number: [id number]  Today's Date: May 2, 2017         Start Time: 1100  End of procedure time: 1130  Procedure: Left supraclavicular lymph node biopsy  Fire Safety Score: 2    Consent Review/Timeout Performed by: Dr. Derick Ashton  Procedure Performed By: Dr. Derick Ashton    Fentanyl administered: 50 mcgs  Versed administered: 1 mg    Start of Sedation Time: 1100  End of Sedation Time: 1130  Total Sedation Time: 30 minutes    Report given to: Trinh PRITCHARD  Time pt departs:  1145      Other Notes:  Alert male transported via cart from 2A room 13 to IR Procedure Room 7 for planned intervention.  ID band confirmed and patient acknowledges understanding of planned procedure. Patient  positioned supine.  Patient prepped and draped per policy see VS flowsheet, MAR for further information.       Pathology contacted with request for presence at 1110.  Pathology present for sample evaluation at 1120.    A total of 3 x fine needle specimen obtained under sterile procedure.  Samples provided to on-site Pathology Staff for evaluation.    Post procedure, site cleansed and dressed per protocol.    Immediately post-procedure, patient c/o dizziness, nausea.  SBP 80, rhythm slowing to low 50's.  Patient diaphoretic.  Physician notified and present at bedside.  Patient placed in trendelenburg, 4 mg IV Zofran given.  S/P treatment, SBP now 108/71.  Patient confirms he is feeling better.    Patient condition post procedure is stable.   Patient returned to 2A for post-procedure monitoring.    Anat Pruitt RN

## 2017-05-02 NOTE — PROGRESS NOTES
Interventional Radiology Pre-Procedure Sedation Assessment   Time of Assessment: 10:12 AM    Expected Level: Moderate Sedation    Indication: Sedation is required for the following type of Procedure: Biopsy    Sedation and procedural consent: Risks, benefits and alternatives were discussed with Patient    PO Intake: Appropriately NPO for procedure    ASA Class: Class 2 - MILD SYSTEMIC DISEASE, NO ACUTE PROBLEMS, NO FUNCTIONAL LIMITATIONS.    Mallampati: Grade 1:  Soft palate, uvula, tonsillar pillars, and posterior pharyngeal wall visible    Lungs: Lungs Clear with good breath sounds bilaterally    Heart: Normal heart sounds and rate    History and physical reviewed and no updates needed. I have reviewed the lab findings, diagnostic data, medications, and the plan for sedation. I have determined this patient to be an appropriate candidate for the planned sedation and procedure and have reassessed the patient IMMEDIATELY PRIOR to sedation and procedure.    Debbie Granados MD

## 2017-05-02 NOTE — PLAN OF CARE
IV discontinued. DC paperwork reviewed with patient and his sister. Biopsy site CDI no bleeding. Escorted to lobby with his sister for transport home.

## 2017-05-02 NOTE — PROGRESS NOTES
Interventional Radiology Brief Post Procedure Note    Procedure: @FVRISFRMTLINK([id number])@    Proceduralist: Dr. Ashton    Assistant: Debbie Granados MD and None    Time Out: Prior to the start of the procedure and with procedural staff participation, I verbally confirmed the patient s identity using two indicators, relevant allergies, that the procedure was appropriate and matched the consent or emergent situation, and that the correct equipment/implants were available. Immediately prior to starting the procedure I conducted the Time Out with the procedural staff and re-confirmed the patient s name, procedure, and site/side. (The Joint Commission universal protocol was followed.)  Yes    Sedation: IR Nurse Monitored Care   Post Procedure Summary:  Prior to the start of the procedure and with procedural staff participation, I verbally confirmed the patient s identity using two indicators, relevant allergies, that the procedure was appropriate and matched the consent or emergent situation, and that the correct equipment/implants were available. Immediately prior to starting the procedure I conducted the Time Out with the procedural staff and re-confirmed the patient s name, procedure, and site/side. (The Joint Commission universal protocol was followed.)  Yes       Sedatives: Fentanyl and Midazolam (Versed)    Vital signs, airway and pulse oximetry were monitored and remained stable throughout the procedure and sedation was maintained until the procedure was complete.  The patient was monitored by staff until sedation discharge criteria were met.    Patient tolerance: Patient tolerated the procedure well with no immediate complications.    Time of sedation in minutes: 30 min minutes from beginning to end of physician one to one monitoring.        Findings: Four FNA samples obtained from left supraclavicular lymph node.    Estimated Blood Loss: Minimal    Fluoroscopy Time: Not applicable    SPECIMENS: Fine needle aspirate  for cytological analysis    Complications: 1. None     Condition: Stable    Plan: Patient to return to  to recover.    Comments: See dictated procedure note for full details.    Debbie Granados MD

## 2017-05-03 LAB — INTERPRETATION ECG - MUSE: NORMAL

## 2017-05-04 LAB — COPATH REPORT: NORMAL

## 2017-05-08 ENCOUNTER — RADIANT APPOINTMENT (OUTPATIENT)
Dept: CARDIOLOGY | Facility: CLINIC | Age: 57
End: 2017-05-08
Attending: INTERNAL MEDICINE

## 2017-05-08 ENCOUNTER — ANESTHESIA EVENT (OUTPATIENT)
Dept: SURGERY | Facility: AMBULATORY SURGERY CENTER | Age: 57
End: 2017-05-08

## 2017-05-08 ENCOUNTER — ONCOLOGY VISIT (OUTPATIENT)
Dept: ONCOLOGY | Facility: CLINIC | Age: 57
End: 2017-05-08
Attending: INTERNAL MEDICINE
Payer: COMMERCIAL

## 2017-05-08 VITALS
OXYGEN SATURATION: 96 % | HEART RATE: 64 BPM | DIASTOLIC BLOOD PRESSURE: 86 MMHG | TEMPERATURE: 98 F | WEIGHT: 315 LBS | SYSTOLIC BLOOD PRESSURE: 132 MMHG | RESPIRATION RATE: 16 BRPM | BODY MASS INDEX: 36.45 KG/M2 | HEIGHT: 78 IN

## 2017-05-08 DIAGNOSIS — C15.5 CANCER OF DISTAL THIRD OF ESOPHAGUS (H): ICD-10-CM

## 2017-05-08 DIAGNOSIS — G47.09 OTHER INSOMNIA: ICD-10-CM

## 2017-05-08 DIAGNOSIS — C77.0 METASTASIS TO HEAD AND NECK LYMPH NODE (H): ICD-10-CM

## 2017-05-08 DIAGNOSIS — C15.5 CANCER OF DISTAL THIRD OF ESOPHAGUS (H): Primary | ICD-10-CM

## 2017-05-08 PROCEDURE — 99215 OFFICE O/P EST HI 40 MIN: CPT | Mod: ZP | Performed by: INTERNAL MEDICINE

## 2017-05-08 PROCEDURE — 99212 OFFICE O/P EST SF 10 MIN: CPT | Mod: ZF

## 2017-05-08 RX ORDER — LORAZEPAM 0.5 MG/1
0.5 TABLET ORAL EVERY 6 HOURS PRN
Qty: 60 TABLET | Refills: 1 | Status: SHIPPED | OUTPATIENT
Start: 2017-05-08 | End: 2018-01-15

## 2017-05-08 RX ORDER — ZOLPIDEM TARTRATE 5 MG/1
TABLET ORAL
Qty: 60 TABLET | Refills: 1 | Status: SHIPPED | OUTPATIENT
Start: 2017-05-08 | End: 2017-07-10

## 2017-05-08 RX ADMIN — Medication 3 ML: at 12:00

## 2017-05-08 ASSESSMENT — PAIN SCALES - GENERAL: PAINLEVEL: NO PAIN (0)

## 2017-05-08 NOTE — LETTER
5/8/2017       RE: Reuben Padilla  PO   [facility] [address]     Dear Colleague,    Thank you for referring your patient, Reuben Padilla, to the [facility]. Please see a copy of my visit note below.    Oncology Consultation:  Date on this visit: 5/8/2017    Reuben Padilla  is referred by Dr.Wayne JAVED Stevens for an oncology consultation. He requires evaluation for new diagnosis of     Primary Physician: Primary Dr, Unknown     History Of Present Illness:  Mr. Padilla is a 56 year old male who presents with new diagnosis of metastatic esophageal cancer.  HISTORY OF PRESENT ILLNESS:    The patient reports that initially he noticed symptoms of dysphagia, mainly to solid food, starting in February of 2017.  Symptoms got progressively worse.  Currently, he is unable to eat solid food, and he needs to take a lot of fluid to push food down.  His dysphagia symptoms prompted subsequent evaluation with upper endoscopy and colonoscopy that was done in [place]. The colonoscopy revealed two tubular adenomas in the colon.  The upper endoscopy revealed an ulcerated mass 1-2 cm long, approximately 40 cm from the lips.  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      The patient was seen by Dr. Clemencia Carreon initially who ordered an ENT evaluation and supraclavicular lymph node FNA to rule out head and neck etiology of the cancer, since outside pathology was reported as SCC. The pathology slides were requested from [facility] for review by the [facility] Pathology Department. Our diagnosis is invasive  poorly differentiated adenocarcinoma.  The FNA of the supraclavicular LN was done that confirmed poorly differentiated adenocarcinoma of GI origin.  The tumor was P63 negative and CK7 negative.  Positive for cytokeratin 20.  The patient also had a PET/CT scan done that revealed widespread lavon metastasis and multiple  "liver mets that were consistent with metastatic disease.    The patient reports worsening discomfort in his lower chest.  He rates it about a 3/10.  He does not take any medication for that.  It is pretty much constant for the last several weeks.  He is still able to eat and swallow pills.  He is eating soft foods with a significant amount of fluids.  He thinks he might have lost some weight, but unsure how much.  He denies any other pain, respiratory complaints, shortness of breath, chest pain, abdominal pain or changes in bowel habits.  He does have neuropathy that has been longstanding, mostly in his feet.   His past medical history is significant only for isolated intermittent atrial fibrillation for which he is intermittently taking a baby aspirin.  He denies any other significant past medical history.  He considers himself to be relatively healthy.      SOCIAL HISTORY:  He has a remote history of smoking, about 20-pack years; he quit 10 years ago.  He denies alcohol or illicit drugs.  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  He has a sister who lives in the city, and he has a 22-year-old son.      REVIEW OF SYSTEMS:  A complete review of systems is negative, except as documented above.           Nursing Notes:   Damián Osullivan CMA  5/8/2017  7:48 AM  Signed  Oncology Rooming Note    May 8, 2017 7:47 AM   Reuben Padilla is a 56 year old male who presents for:    Chief Complaint   Patient presents with     Oncology Clinic Visit     new patient consultation for newly dx of esophageal cancer      Initial Vitals: /86  Pulse 64  Temp 98  F (36.7  C) (Oral)  Resp 16  Ht 1.981 m (6' 6\")  Wt (!) 168.7 kg (372 lb)  SpO2 96%  BMI 42.99 kg/m2 Estimated body mass index is 42.99 kg/(m^2) as calculated from the following:    Height as of this encounter: 1.981 m (6' 6\").    Weight as of this encounter: 168.7 kg (372 lb). Body surface area is 3.05 meters squared.  No Pain (0) Comment: upper chest discomfort. "   No LMP for male patient.  Allergies reviewed: Yes  Medications reviewed: Yes    Medications: Medication refills not needed today.  Pharmacy name entered into EPIC: Data Unavailable    Clinical concerns: upper chest discomfort Dr. Dee was notified.    5 minutes for nursing intake (face to face time)     Damián Osullivan CMA              Past Medical/Surgical History:  Past Medical History:   Diagnosis Date     Atrial fibrillation (H)      Cancer (H)     esophageal     Glaucoma      Past Surgical History:   Procedure Laterality Date     AMPUTATION      toe     bunion surgery       CHOLECYSTECTOMY     Allergies:  Allergies as of 05/08/2017 - David as Reviewed 05/02/2017   Allergen Reaction Noted     Penicillin g Other (See Comments) 01/19/2017     Penicillins Unknown 04/27/2017     Current Medications:  Current Outpatient Prescriptions   Medication Sig Dispense Refill     LORazepam (ATIVAN) 0.5 MG tablet Take 1 tablet (0.5 mg) by mouth every 6 hours as needed for anxiety 60 tablet 1     zolpidem (AMBIEN) 5 MG tablet Take 1-2 tablets each evening as needed 60 tablet 1     multivitamin, therapeutic with minerals (MULTI-VITAMIN) TABS tablet Take 1 tablet by mouth daily       fish oil-omega-3 fatty acids 1000 MG capsule Take 2 g by mouth daily       IBUPROFEN PO Take 800 mg by mouth every 8 hours as needed for moderate pain       latanoprost (XALATAN) 0.005 % ophthalmic solution Place into both eyes At Bedtime       timolol (TIMOPTIC) 0.5 % ophthalmic solution Place into both eyes daily        Family History:    Maternal grandmother had breast cancer diagnosed at her 50's.Paternal aunt had some gynecologic cancer    Social History:  Social History     Social History     Marital status: Single     Spouse name: N/A     Number of children: N/A     Years of education: N/A     Occupational History     Not on file.     Social History Main Topics     Smoking status: Former Smoker     Smokeless tobacco: Never Used     Alcohol use  "Yes      Comment: occasional     Drug use: No      Comment: h/o over 30 years ago     Sexual activity: Not on file     Other Topics Concern     Not on file     Social History Narrative     Physical Exam:  /86  Pulse 64  Temp 98  F (36.7  C) (Oral)  Resp 16  Ht 1.981 m (6' 6\")  Wt (!) 168.7 kg (372 lb)  SpO2 96%  BMI 42.99 kg/m2    GENERAL APPEARANCE: healthy, alert and no distress     HENT: Mouth without ulcers or lesions     NECK: no adenopathy, no asymmetry or masses     LYMPHATICS: No cervical, supraclavicular, axillary or inguinal lymphadenopathy appreciated     RESP: lungs clear to auscultation - no rales, rhonchi or wheezes     CARDIOVASCULAR: regular rates and rhythm, normal S1 S2, no S3 or S4 and no murmur.     ABDOMEN:  soft, nontender, no HSM or masses and bowel sounds normal     MUSCULOSKELETAL: extremities normal- no gross deformities noted, no evidence of inflammation in joints, FROM in all extremities. No edema b/l LE.     SKIN: no suspicious lesions or rashes     PSYCHIATRIC: mentation appears normal and affect normal  Laboratory/Imaging Studies:  CBC RESULTS:   Recent Labs   Lab Test  04/27/17   1350   WBC  5.5   RBC  5.15   HGB  14.8   HCT  44.8   MCV  87   MCH  28.7   MCHC  33.0   RDW  12.4   PLT  236     Recent Labs   Lab Test  04/27/17   1350   NA  140   POTASSIUM  4.4   CHLORIDE  108   CO2  25   ANIONGAP  7   GLC  94   BUN  11   CR  0.76   NIDHI  9.0     Pathology:  FINAL DIAGNOSIS:   CASE FROM [facility], [place], [place] (, OBTAINED 04/14/2017):   A. STOMACH, ANTRUM, BIOPSY:   - Mild chronic gastritis   - No H. pylori like organisms identified on routine staining   - Negative for intestinal metaplasia or dysplasia     B. ESOPHAGUS, DISTAL, BIOPSY:   - Invasive adenocarcinoma, poorly-differentiated, see Comment     C. LARGE INTESTINE, SIGMOID POLYP, BIOPSY:   - Multiple fragments of tubular adenoma (3)   - Two fragments of unremarkable " colonic mucosa     D. LARGE INTESTINE, DESCENDING POLYP, BIOPSY:   - Multiple fragments of tubular adenoma (3)   - One fragment of unremarkable colonic mucosa     COMMENT:   Immunohistochemical stains were performed on block B with the   appropriate controls.  The neoplastic cells are positive for MOC-31,   cytokeratin 20.  The tumor cells are negative for p40 and cytokeratin 7.    A special stain for mucicarmine, performed with the appropriate control   shows patchy expression of intracellular mucin.  The morphologic and   immunohistochemical features are most consistent with adenocarcinoma of   gastrointestinal origin. However, the staining pattern is somewhat   unusual for an upper gastrointestinal adenocarcinoma (usually CK7   positive), and metastasis of a lower gastrointestinal tract   adenocarcinoma (usually CK20 positive) should be excluded.  Poorly   differentiated carcinomas can exhibit patchy expression of cytokeratin 7   which is also a factor in the interpretation of immunohistochemical   staining patterns (this biopsy represents a limited sample of the   tumor). Clinical and radiological correlation is recommended.       FNA biopsy 5/2/17    CYTOLOGIC INTERPRETATION:    Lymph node, left supraclavicular, ultrasound-guided fine needle  aspiration:  - Positive for malignancy.  - Consistent with adenocarcinoma      PET/CT:  4/27/17  IMPRESSION:   1. In this patient with newly diagnosed esophageal cancer, there are  widespread lavon metastases and multiple liver metastases.  2. Small pulmonary nodules are below the size threshold for PET and  continued attention on follow-up is recommended.  3. Bilateral adrenal myelolipomas.    ASSESSMENT/PLAN:  1.  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  This corresponds to stage IV cancer.  We discussed with the patient that this means his  disease is incurable, and that treatment intent will be palliative to help control his symptoms and help him live longer.  We discussed the pathology results.  It is adenocarcinoma, not squamous carcinoma as initially was read at an outside institution.  That means we can add one more test, which is HER2-kei testing.  We requested the testing, and it is pending.  If that is positive, he will be a candidate for Herceptin in addition to the chemotherapy.  Otherwise, we discussed that we will start palliative chemotherapy that includes 5-FU and oxaliplatin every 2 weeks.  The 5-FU is given as a bolus, and then a 46-hour infusion.  We discussed the major side effects of the chemo such as myelosuppression, GI toxicity, alopecia, neuropathy and others.  The patient will need formal teaching with the nurse coordinator.  He agrees to proceed with chemotherapy.  He will need to get a port prior to starting, and also we will plan for a TTE in case he is positive for HER2 and we need Herceptin.    He will return in 2 weeks after chemotherapy.   2.  A-Fib.  He seems to have intermittent a-fib. Currently asymptomatic and in sinus rhythm.  He is advised to continue aspirin.   3.  Anxiety.  The patient reports feeling a little bit anxious.  All this is related to the newly diagnosed metastatic disease.  He has trouble sleeping.  We will give him a prescription for Ambien.       Multiple questions were answered.      The patient was seen and discussed with Dr. Dee.      Julissa Taylor MD  Hem-Onc Fellow                 Addendum:  Pt was seen and evaluated by me with Dr Taylor.  I reviewed with the patient that I believe he has metastatic stage 4 esophageal cancer, adenocarcinoma, her2 testing pending, with involvement of the liver, lymph nodes and possibly lungs . I discussed with him that this is incurable, and we can prescribe palliative chemotherapy to help control symptoms as well as to help him live longer.      We  discussed moving forward with chemotherapy.  He will need a port placed.  We discussed using FOLFOX chemotherapy wth 5FU over 46 hours and oxaliplatin IV every 2 weeks.  We discussed the side effects of chemotherapy including myelosuppression, nausea/vomiting/diarrhea/constipation, hair loss, neuropathy, fertility complications, dehydration, cardiomyopathy, etc.  The patient will be receiving chemotherapy teaching through my nurse coordinator with handouts describing all of the side effects again.  Consent for chemotherapy was obtained.      Will plan for echo (in anticipation that if this is her2 positive, I will add herceptin).  And port this week as well as to start chemotherapy.  Return in 2 weeks after chemo.  Plan for 4 cycles and reimaging.    Kadi Dee MD    Addendum:  On pathology from 4/25, pathology is her2 negative.  Will move forward with FOLFOX>    Kadi Dee MD

## 2017-05-08 NOTE — NURSING NOTE
"Oncology Rooming Note    May 8, 2017 7:47 AM   Reuben Padilla is a 56 year old male who presents for:    Chief Complaint   Patient presents with     Oncology Clinic Visit     new patient consultation for newly dx of esophageal cancer      Initial Vitals: /86  Pulse 64  Temp 98  F (36.7  C) (Oral)  Resp 16  Ht 1.981 m (6' 6\")  Wt (!) 168.7 kg (372 lb)  SpO2 96%  BMI 42.99 kg/m2 Estimated body mass index is 42.99 kg/(m^2) as calculated from the following:    Height as of this encounter: 1.981 m (6' 6\").    Weight as of this encounter: 168.7 kg (372 lb). Body surface area is 3.05 meters squared.  No Pain (0) Comment: upper chest discomfort.   No LMP for male patient.  Allergies reviewed: Yes  Medications reviewed: Yes    Medications: Medication refills not needed today.  Pharmacy name entered into EPIC: Data Unavailable    Clinical concerns: upper chest discomfort Dr. Dee was notified.    5 minutes for nursing intake (face to face time)     Damián Osullivan CMA              "

## 2017-05-08 NOTE — MR AVS SNAPSHOT
After Visit Summary   5/8/2017    Reuben Padilla    MRN: [id number]           Patient Information     Date Of Birth          1960        Visit Information        Provider Department      5/8/2017 8:00 AM Kadi Dee MD [facility]        Today's Diagnoses     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)    -  1    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)        Other insomnia           Follow-ups after your visit        Your next 10 appointments already scheduled     May 08, 2017 11:00 AM CDT   Ech Complete with [id number]   [facility] Echo ([facility])    [address] [place] [address]  3rd [facility] [address] [phone]           1.  Please bring or wear a comfortable two-piece outfit. 2.  You may eat, drink and take your normal medicines. 3.  For any questions that cannot be answered, please contact the ordering physician            May 09, 2017   Procedure with Jasiel Hu PA-C   [facility] ([facility])    [address] [place] [address] [phone]           Located in the Clinics and Surgery Center at [address] [place] [address].   parking is very convenient and highly recommended.  is a $6 flat rate fee.  Both  and self parkers should enter the main arrival plaza from [facility]; parking attendants will direct you based on your parking preference.            May 09, 2017 10:00 AM CDT   (Arrive by 8:30 AM)   IR CHEST PORT PLACEMENT > 5 YRS OF AGE with [id number]   [facility] ASC Imaging ([facility])    [address] [place] [id number]              1. Your doctor will need to do a history and physical within 7 days before this procedure. 2. Your doctor will which medications should not be taken the morning of the exam. 3. Laboratory tests are to be obtained by your  doctor prior to the exam (Basic Metabolic Panel, CBCP, PTT and INR) (No labs needed if you are having a tunneled catheter exchange or removal) 4. If you have allergies to x-ray contrast or iodine, contact your doctor or a Radiology nurse prior to the exam day for instructions. 5. Someone will need to drive you to and from the hospital. 6. If you are or may be pregnant, contact your doctor or a Radiology nurse prior to the day of the exam. 7. If you have diabetes, check with your doctor or a Radiology nurse to see if your insulin needs to be adjusted for the exam. 8. If you are taking a medication called Glucophage or Glucovance; these medications need to be held the day of the exam and for approximately 48 hours following. A blood sample must be drawn so your creatinine level can be checked before resuming this medication. 9. If you are taking Coumadin (to thin you blood) please contact your doctor or a Radiology nurse at least 3 days before the exam for special instructions. 10. You should not have received contrast within 48 hours of this exam. 11. The day before your exam you may eat your regular diet and are encouraged to drink at least 2 quarts of clear liquids. Drink no alcoholic beverages for 24 hours prior to the exam. 12. If you have a colostomy you will need to irrigate it with tap water at 8PM the evening before and again at 6AM the morning of the exam. 13. Do not smoke for 24 hours prior to the procedure. 14. Birth to 4 years: - Breast feeding must be stopped 4 hours prior to exam - Solid food or formula must be stopped 6 hours prior to exam - Tube feedings must be stopped 6 hours prior to exam 15. 4-10 years old: - Nothing to eat or drink 6 hours prior to exam 16. 10+ years old: - Nothing to eat or drink 8 hours prior to exam 17. The morning of the exam you may brush your teeth and take medications as directed with a sip of water. 18. When discharged, you cannot drive until morning, and an adult must be  with you until then. You should stay in the [facility] overnight. 19. Bring a list of all drugs you are taking; include supplements and over-the-counter medications. Wear comfortable clothes and leave your valuables at home.            May 15, 2017  6:30 AM CDT   [facility] Lab Draw with UC [facility]IC LAB DRAW   [facility] Lab Draw ([facility])    [address] [address] [phone]            May 15, 2017  7:00 AM CDT   Infusion 240 with  ONCOLOGY INFUSION, UC 10 ATC   [facility] Cancer Clinic ([facility])    [address] [address] [phone]              Future tests that were ordered for you today     Open Future Orders        Priority Expected Expires Ordered    Echocardiogram Complete Routine  5/8/2018 5/8/2017    IR Chest Port Placement > 5 Yrs of Age Routine  5/8/2018 5/8/2017            Who to contact     If you have questions or need follow up information about today's clinic visit or your schedule please contact [facility]CER [facility] directly at [phone].  Normal or non-critical lab and imaging results will be communicated to you by Cumuluxhart, letter or phone within 4 business days after the clinic has received the results. If you do not hear from us within 7 days, please contact the clinic through Cumuluxhart or phone. If you have a critical or abnormal lab result, we will notify you by phone as soon as possible.  Submit refill requests through YuuConnect or call your pharmacy and they will forward the refill request to us. Please allow 3 business days for your refill to be completed.          Additional Information About Your Visit        Cumuluxhart Information     YuuConnect gives you secure access to your electronic health record. If you see a primary care provider, you can also send messages to your care team and make appointments. If you have questions, please  "call your primary care clinic.  If you do not have a primary care provider, please call [phone] and they will assist you.        Care EveryWhere ID     This is your Care EveryWhere ID. This could be used by other organizations to access your [place] medical records  [id number]        Your Vitals Were     Pulse Temperature Respirations Height Pulse Oximetry BMI (Body Mass Index)    64 98  F (36.7  C) (Oral) 16 1.981 m (6' 6\") 96% 42.99 kg/m2       Blood Pressure from Last 3 Encounters:   05/08/17 132/86   05/02/17 100/60   04/27/17 123/86    Weight from Last 3 Encounters:   05/08/17 (!) 168.7 kg (372 lb)   04/27/17 (!) 169.2 kg (373 lb)                 Today's Medication Changes          These changes are accurate as of: 5/8/17 10:26 AM.  If you have any questions, ask your nurse or doctor.               Start taking these medicines.        Dose/Directions    LORazepam 0.5 MG tablet   Commonly known as:  ATIVAN   Used for:  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), 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   Started by:  Kadi Dee MD        Dose:  0.5 mg   Take 1 tablet (0.5 mg) by mouth every 6 hours as needed for anxiety   Quantity:  60 tablet   Refills:  1       zolpidem 5 MG tablet   Commonly known as:  AMBIEN   Used for:  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), 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   Started by:  Kadi Dee MD        Take 1-2 tablets each evening as needed   Quantity:  60 tablet   Refills:  1            Where to get your medicines      Some of these will need a paper prescription and others can be bought over the counter.  Ask your nurse if you have questions.     Bring a paper prescription for each of these medications     LORazepam 0.5 MG tablet    zolpidem 5 MG tablet                Primary Care Provider    Unknown Primary MD Sydney       No address on file        Thank you!     Thank you for choosing [facility]CER [facility] "  for your care. Our goal is always to provide you with excellent care. Hearing back from our patients is one way we can continue to improve our services. Please take a few minutes to complete the written survey that you may receive in the mail after your visit with us. Thank you!             Your Updated Medication List - Protect others around you: Learn how to safely use, store and throw away your medicines at www.disposemymeds.org.          This list is accurate as of: 5/8/17 10:26 AM.  Always use your most recent med list.                   Brand Name Dispense Instructions for use    fish oil-omega-3 fatty acids 1000 MG capsule      Take 2 g by mouth daily       IBUPROFEN PO      Take 800 mg by mouth every 8 hours as needed for moderate pain       latanoprost 0.005 % ophthalmic solution    XALATAN     Place into both eyes At Bedtime       LORazepam 0.5 MG tablet    ATIVAN    60 tablet    Take 1 tablet (0.5 mg) by mouth every 6 hours as needed for anxiety       Multi-vitamin Tabs tablet      Take 1 tablet by mouth daily       timolol 0.5 % ophthalmic solution    TIMOPTIC     Place into both eyes daily       zolpidem 5 MG tablet    AMBIEN    60 tablet    Take 1-2 tablets each evening as needed

## 2017-05-08 NOTE — PROGRESS NOTES
Oncology Consultation:  Date on this visit: 5/8/2017    Reuben Padilla  is referred by Dr.Wayne JAVED Stevens for an oncology consultation. He requires evaluation for new diagnosis of     Primary Physician: Primary Dr, Unknown     History Of Present Illness:  Mr. Padilla is a 56 year old male who presents with new diagnosis of metastatic esophageal cancer.  HISTORY OF PRESENT ILLNESS:    The patient reports that initially he noticed symptoms of dysphagia, mainly to solid food, starting in February of 2017.  Symptoms got progressively worse.  Currently, he is unable to eat solid food, and he needs to take a lot of fluid to push food down.  His dysphagia symptoms prompted subsequent evaluation with upper endoscopy and colonoscopy that was done in [place]. The colonoscopy revealed two tubular adenomas in the colon.  The upper endoscopy revealed an ulcerated mass 1-2 cm long, approximately 40 cm from the lips.  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      The patient was seen by Dr. Clemencia Carreon initially who ordered an ENT evaluation and supraclavicular lymph node FNA to rule out head and neck etiology of the cancer, since outside pathology was reported as SCC. The pathology slides were requested from [facility] for review by the [facility]ology Department. Our diagnosis is invasive  poorly differentiated adenocarcinoma.  The FNA of the supraclavicular LN was done that confirmed poorly differentiated adenocarcinoma of GI origin.  The tumor was P63 negative and CK7 negative.  Positive for cytokeratin 20.  The patient also had a PET/CT scan done that revealed widespread lavon metastasis and multiple liver mets that were consistent with metastatic disease.    The patient reports worsening discomfort in his lower chest.  He rates it about a 3/10.  He does not take any medication for that.  It is pretty much constant for the last  "several weeks.  He is still able to eat and swallow pills.  He is eating soft foods with a significant amount of fluids.  He thinks he might have lost some weight, but unsure how much.  He denies any other pain, respiratory complaints, shortness of breath, chest pain, abdominal pain or changes in bowel habits.  He does have neuropathy that has been longstanding, mostly in his feet.   His past medical history is significant only for isolated intermittent atrial fibrillation for which he is intermittently taking a baby aspirin.  He denies any other significant past medical history.  He considers himself to be relatively healthy.      SOCIAL HISTORY:  He has a remote history of smoking, about 20-pack years; he quit 10 years ago.  He denies alcohol or illicit drugs.  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  He has a sister who lives in the city, and he has a 22-year-old son.      REVIEW OF SYSTEMS:  A complete review of systems is negative, except as documented above.           Nursing Notes:   Damián Osullivan CMA  5/8/2017  7:48 AM  Signed  Oncology Rooming Note    May 8, 2017 7:47 AM   Reuben Padilla is a 56 year old male who presents for:    Chief Complaint   Patient presents with     Oncology Clinic Visit     new patient consultation for newly dx of esophageal cancer      Initial Vitals: /86  Pulse 64  Temp 98  F (36.7  C) (Oral)  Resp 16  Ht 1.981 m (6' 6\")  Wt (!) 168.7 kg (372 lb)  SpO2 96%  BMI 42.99 kg/m2 Estimated body mass index is 42.99 kg/(m^2) as calculated from the following:    Height as of this encounter: 1.981 m (6' 6\").    Weight as of this encounter: 168.7 kg (372 lb). Body surface area is 3.05 meters squared.  No Pain (0) Comment: upper chest discomfort.   No LMP for male patient.  Allergies reviewed: Yes  Medications reviewed: Yes    Medications: Medication refills not needed today.  Pharmacy name entered into EPIC: Data Unavailable    Clinical concerns: upper chest discomfort Dr." Leila was notified.    5 minutes for nursing intake (face to face time)     Damián Osullivan CMA              Past Medical/Surgical History:  Past Medical History:   Diagnosis Date     Atrial fibrillation (H)      Cancer (H)     esophageal     Glaucoma      Past Surgical History:   Procedure Laterality Date     AMPUTATION      toe     bunion surgery       CHOLECYSTECTOMY     Allergies:  Allergies as of 05/08/2017 - David as Reviewed 05/02/2017   Allergen Reaction Noted     Penicillin g Other (See Comments) 01/19/2017     Penicillins Unknown 04/27/2017     Current Medications:  Current Outpatient Prescriptions   Medication Sig Dispense Refill     LORazepam (ATIVAN) 0.5 MG tablet Take 1 tablet (0.5 mg) by mouth every 6 hours as needed for anxiety 60 tablet 1     zolpidem (AMBIEN) 5 MG tablet Take 1-2 tablets each evening as needed 60 tablet 1     multivitamin, therapeutic with minerals (MULTI-VITAMIN) TABS tablet Take 1 tablet by mouth daily       fish oil-omega-3 fatty acids 1000 MG capsule Take 2 g by mouth daily       IBUPROFEN PO Take 800 mg by mouth every 8 hours as needed for moderate pain       latanoprost (XALATAN) 0.005 % ophthalmic solution Place into both eyes At Bedtime       timolol (TIMOPTIC) 0.5 % ophthalmic solution Place into both eyes daily        Family History:    Maternal grandmother had breast cancer diagnosed at her 50's.Paternal aunt had some gynecologic cancer    Social History:  Social History     Social History     Marital status: Single     Spouse name: N/A     Number of children: N/A     Years of education: N/A     Occupational History     Not on file.     Social History Main Topics     Smoking status: Former Smoker     Smokeless tobacco: Never Used     Alcohol use Yes      Comment: occasional     Drug use: No      Comment: h/o over 30 years ago     Sexual activity: Not on file     Other Topics Concern     Not on file     Social History Narrative     Physical Exam:  /86  Pulse 64  Temp  "98  F (36.7  C) (Oral)  Resp 16  Ht 1.981 m (6' 6\")  Wt (!) 168.7 kg (372 lb)  SpO2 96%  BMI 42.99 kg/m2    GENERAL APPEARANCE: healthy, alert and no distress     HENT: Mouth without ulcers or lesions     NECK: no adenopathy, no asymmetry or masses     LYMPHATICS: No cervical, supraclavicular, axillary or inguinal lymphadenopathy appreciated     RESP: lungs clear to auscultation - no rales, rhonchi or wheezes     CARDIOVASCULAR: regular rates and rhythm, normal S1 S2, no S3 or S4 and no murmur.     ABDOMEN:  soft, nontender, no HSM or masses and bowel sounds normal     MUSCULOSKELETAL: extremities normal- no gross deformities noted, no evidence of inflammation in joints, FROM in all extremities. No edema b/l LE.     SKIN: no suspicious lesions or rashes     PSYCHIATRIC: mentation appears normal and affect normal  Laboratory/Imaging Studies:  CBC RESULTS:   Recent Labs   Lab Test  04/27/17   1350   WBC  5.5   RBC  5.15   HGB  14.8   HCT  44.8   MCV  87   MCH  28.7   MCHC  33.0   RDW  12.4   PLT  236     Recent Labs   Lab Test  04/27/17   1350   NA  140   POTASSIUM  4.4   CHLORIDE  108   CO2  25   ANIONGAP  7   GLC  94   BUN  11   CR  0.76   NIDHI  9.0     Pathology:  FINAL DIAGNOSIS:   CASE FROM [facility], [place], [place] (, OBTAINED 04/14/2017):   A. STOMACH, ANTRUM, BIOPSY:   - Mild chronic gastritis   - No H. pylori like organisms identified on routine staining   - Negative for intestinal metaplasia or dysplasia     B. ESOPHAGUS, DISTAL, BIOPSY:   - Invasive adenocarcinoma, poorly-differentiated, see Comment     C. LARGE INTESTINE, SIGMOID POLYP, BIOPSY:   - Multiple fragments of tubular adenoma (3)   - Two fragments of unremarkable colonic mucosa     D. LARGE INTESTINE, DESCENDING POLYP, BIOPSY:   - Multiple fragments of tubular adenoma (3)   - One fragment of unremarkable colonic mucosa     COMMENT:   Immunohistochemical stains were performed on block B with the "   appropriate controls.  The neoplastic cells are positive for MOC-31,   cytokeratin 20.  The tumor cells are negative for p40 and cytokeratin 7.    A special stain for mucicarmine, performed with the appropriate control   shows patchy expression of intracellular mucin.  The morphologic and   immunohistochemical features are most consistent with adenocarcinoma of   gastrointestinal origin. However, the staining pattern is somewhat   unusual for an upper gastrointestinal adenocarcinoma (usually CK7   positive), and metastasis of a lower gastrointestinal tract   adenocarcinoma (usually CK20 positive) should be excluded.  Poorly   differentiated carcinomas can exhibit patchy expression of cytokeratin 7   which is also a factor in the interpretation of immunohistochemical   staining patterns (this biopsy represents a limited sample of the   tumor). Clinical and radiological correlation is recommended.       FNA biopsy 5/2/17    CYTOLOGIC INTERPRETATION:    Lymph node, left supraclavicular, ultrasound-guided fine needle  aspiration:  - Positive for malignancy.  - Consistent with adenocarcinoma      PET/CT:  4/27/17  IMPRESSION:   1. In this patient with newly diagnosed esophageal cancer, there are  widespread lavon metastases and multiple liver metastases.  2. Small pulmonary nodules are below the size threshold for PET and  continued attention on follow-up is recommended.  3. Bilateral adrenal myelolipomas.    ASSESSMENT/PLAN:  1.  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  This corresponds to stage IV cancer.  We discussed with the patient that this means his disease is incurable, and that treatment intent will be palliative to help control his symptoms and help him live longer.  We discussed the pathology results.  It is adenocarcinoma, not squamous carcinoma as initially was read at an  outside institution.  That means we can add one more test, which is HER2-kei testing.  We requested the testing, and it is pending.  If that is positive, he will be a candidate for Herceptin in addition to the chemotherapy.  Otherwise, we discussed that we will start palliative chemotherapy that includes 5-FU and oxaliplatin every 2 weeks.  The 5-FU is given as a bolus, and then a 46-hour infusion.  We discussed the major side effects of the chemo such as myelosuppression, GI toxicity, alopecia, neuropathy and others.  The patient will need formal teaching with the nurse coordinator.  He agrees to proceed with chemotherapy.  He will need to get a port prior to starting, and also we will plan for a TTE in case he is positive for HER2 and we need Herceptin.    He will return in 2 weeks after chemotherapy.   2.  A-Fib.  He seems to have intermittent a-fib. Currently asymptomatic and in sinus rhythm.  He is advised to continue aspirin.   3.  Anxiety.  The patient reports feeling a little bit anxious.  All this is related to the newly diagnosed metastatic disease.  He has trouble sleeping.  We will give him a prescription for Ambien.       Multiple questions were answered.      The patient was seen and discussed with Dr. Dee.      Julissa Taylor MD  Hem-Onc Fellow                 Addendum:  Pt was seen and evaluated by me with Dr Taylor.  I reviewed with the patient that I believe he has metastatic stage 4 esophageal cancer, adenocarcinoma, her2 testing pending, with involvement of the liver, lymph nodes and possibly lungs . I discussed with him that this is incurable, and we can prescribe palliative chemotherapy to help control symptoms as well as to help him live longer.      We discussed moving forward with chemotherapy.  He will need a port placed.  We discussed using FOLFOX chemotherapy wth 5FU over 46 hours and oxaliplatin IV every 2 weeks.  We discussed the side effects of chemotherapy including  myelosuppression, nausea/vomiting/diarrhea/constipation, hair loss, neuropathy, fertility complications, dehydration, cardiomyopathy, etc.  The patient will be receiving chemotherapy teaching through my nurse coordinator with handouts describing all of the side effects again.  Consent for chemotherapy was obtained.      Will plan for echo (in anticipation that if this is her2 positive, I will add herceptin).  And port this week as well as to start chemotherapy.  Return in 2 weeks after chemo.  Plan for 4 cycles and reimaging.    Kadi Dee MD    Addendum:  On pathology from 4/25, pathology is her2 negative.  Will move forward with FOLFOX>    Kadi Dee MD

## 2017-05-09 ENCOUNTER — HOSPITAL ENCOUNTER (OUTPATIENT)
Facility: AMBULATORY SURGERY CENTER | Age: 57
End: 2017-05-09
Attending: PHYSICIAN ASSISTANT

## 2017-05-09 ENCOUNTER — SURGERY (OUTPATIENT)
Age: 57
End: 2017-05-09

## 2017-05-09 ENCOUNTER — ANESTHESIA (OUTPATIENT)
Dept: SURGERY | Facility: AMBULATORY SURGERY CENTER | Age: 57
End: 2017-05-09

## 2017-05-09 ENCOUNTER — CARE COORDINATION (OUTPATIENT)
Dept: ONCOLOGY | Facility: CLINIC | Age: 57
End: 2017-05-09

## 2017-05-09 VITALS
HEIGHT: 78 IN | SYSTOLIC BLOOD PRESSURE: 111 MMHG | OXYGEN SATURATION: 99 % | DIASTOLIC BLOOD PRESSURE: 58 MMHG | BODY MASS INDEX: 36.45 KG/M2 | RESPIRATION RATE: 16 BRPM | WEIGHT: 315 LBS | TEMPERATURE: 97.3 F

## 2017-05-09 LAB
APTT PPP: 32 SEC (ref 22–37)
ERYTHROCYTE [DISTWIDTH] IN BLOOD BY AUTOMATED COUNT: 12.3 % (ref 10–15)
HCT VFR BLD AUTO: 43.2 % (ref 40–53)
HGB BLD-MCNC: 14.9 G/DL (ref 13.3–17.7)
INR PPP: 1.16 (ref 0.86–1.14)
MCH RBC QN AUTO: 28.9 PG (ref 26.5–33)
MCHC RBC AUTO-ENTMCNC: 34.5 G/DL (ref 31.5–36.5)
MCV RBC AUTO: 84 FL (ref 78–100)
PLATELET # BLD AUTO: 218 10E9/L (ref 150–450)
RBC # BLD AUTO: 5.15 10E12/L (ref 4.4–5.9)
WBC # BLD AUTO: 5.3 10E9/L (ref 4–11)

## 2017-05-09 DEVICE — CATH PORT POWERPORT CLEARVUE SLIM 8FR 5618000
Type: IMPLANTABLE DEVICE | Site: CHEST | Status: NON-FUNCTIONAL
Removed: 2023-05-26

## 2017-05-09 RX ORDER — DIPHENHYDRAMINE HYDROCHLORIDE 50 MG/ML
50 INJECTION INTRAMUSCULAR; INTRAVENOUS
Status: CANCELLED
Start: 2017-05-15

## 2017-05-09 RX ORDER — EPINEPHRINE 0.3 MG/.3ML
0.3 INJECTION SUBCUTANEOUS EVERY 5 MIN PRN
Status: CANCELLED | OUTPATIENT
Start: 2017-05-15

## 2017-05-09 RX ORDER — HEPARIN SODIUM (PORCINE) LOCK FLUSH IV SOLN 100 UNIT/ML 100 UNIT/ML
5 SOLUTION INTRAVENOUS
Status: DISCONTINUED | OUTPATIENT
Start: 2017-05-09 | End: 2017-05-10 | Stop reason: HOSPADM

## 2017-05-09 RX ORDER — ALBUTEROL SULFATE 90 UG/1
1-2 AEROSOL, METERED RESPIRATORY (INHALATION)
Status: CANCELLED
Start: 2017-05-15

## 2017-05-09 RX ORDER — ALBUTEROL SULFATE 0.83 MG/ML
2.5 SOLUTION RESPIRATORY (INHALATION)
Status: CANCELLED | OUTPATIENT
Start: 2017-05-15

## 2017-05-09 RX ORDER — FLUOROURACIL 50 MG/ML
400 INJECTION, SOLUTION INTRAVENOUS ONCE
Status: CANCELLED | OUTPATIENT
Start: 2017-05-15

## 2017-05-09 RX ORDER — HEPARIN SODIUM (PORCINE) LOCK FLUSH IV SOLN 100 UNIT/ML 100 UNIT/ML
SOLUTION INTRAVENOUS PRN
Status: DISCONTINUED | OUTPATIENT
Start: 2017-05-09 | End: 2017-05-09 | Stop reason: HOSPADM

## 2017-05-09 RX ORDER — MEPERIDINE HYDROCHLORIDE 25 MG/ML
25 INJECTION INTRAMUSCULAR; INTRAVENOUS; SUBCUTANEOUS EVERY 30 MIN PRN
Status: CANCELLED | OUTPATIENT
Start: 2017-05-15

## 2017-05-09 RX ORDER — PROPOFOL 10 MG/ML
INJECTION, EMULSION INTRAVENOUS PRN
Status: DISCONTINUED | OUTPATIENT
Start: 2017-05-09 | End: 2017-05-09

## 2017-05-09 RX ORDER — SODIUM CHLORIDE, SODIUM LACTATE, POTASSIUM CHLORIDE, CALCIUM CHLORIDE 600; 310; 30; 20 MG/100ML; MG/100ML; MG/100ML; MG/100ML
INJECTION, SOLUTION INTRAVENOUS CONTINUOUS
Status: DISCONTINUED | OUTPATIENT
Start: 2017-05-09 | End: 2017-05-10 | Stop reason: HOSPADM

## 2017-05-09 RX ORDER — LIDOCAINE HYDROCHLORIDE 20 MG/ML
INJECTION, SOLUTION INFILTRATION; PERINEURAL PRN
Status: DISCONTINUED | OUTPATIENT
Start: 2017-05-09 | End: 2017-05-09

## 2017-05-09 RX ORDER — SODIUM CHLORIDE 9 MG/ML
1000 INJECTION, SOLUTION INTRAVENOUS CONTINUOUS PRN
Status: CANCELLED
Start: 2017-05-15

## 2017-05-09 RX ORDER — CLINDAMYCIN PHOSPHATE 900 MG/50ML
900 INJECTION, SOLUTION INTRAVENOUS
Status: DISCONTINUED | OUTPATIENT
Start: 2017-05-09 | End: 2017-05-10 | Stop reason: HOSPADM

## 2017-05-09 RX ORDER — PROPOFOL 10 MG/ML
INJECTION, EMULSION INTRAVENOUS CONTINUOUS PRN
Status: DISCONTINUED | OUTPATIENT
Start: 2017-05-09 | End: 2017-05-09

## 2017-05-09 RX ORDER — METHYLPREDNISOLONE SODIUM SUCCINATE 125 MG/2ML
125 INJECTION, POWDER, LYOPHILIZED, FOR SOLUTION INTRAMUSCULAR; INTRAVENOUS
Status: CANCELLED
Start: 2017-05-15

## 2017-05-09 RX ORDER — GABAPENTIN 300 MG/1
300 CAPSULE ORAL ONCE
Status: COMPLETED | OUTPATIENT
Start: 2017-05-09 | End: 2017-05-09

## 2017-05-09 RX ORDER — HEPARIN SODIUM,PORCINE 10 UNIT/ML
5 VIAL (ML) INTRAVENOUS EVERY 24 HOURS
Status: DISCONTINUED | OUTPATIENT
Start: 2017-05-09 | End: 2017-05-10 | Stop reason: HOSPADM

## 2017-05-09 RX ORDER — ONDANSETRON 4 MG/1
4 TABLET, ORALLY DISINTEGRATING ORAL EVERY 30 MIN PRN
Status: DISCONTINUED | OUTPATIENT
Start: 2017-05-09 | End: 2017-05-10 | Stop reason: HOSPADM

## 2017-05-09 RX ORDER — ACETAMINOPHEN 325 MG/1
975 TABLET ORAL ONCE
Status: COMPLETED | OUTPATIENT
Start: 2017-05-09 | End: 2017-05-09

## 2017-05-09 RX ORDER — SODIUM CHLORIDE 9 MG/ML
INJECTION, SOLUTION INTRAVENOUS CONTINUOUS
Status: DISCONTINUED | OUTPATIENT
Start: 2017-05-09 | End: 2017-05-10 | Stop reason: HOSPADM

## 2017-05-09 RX ORDER — FENTANYL CITRATE 50 UG/ML
25-50 INJECTION, SOLUTION INTRAMUSCULAR; INTRAVENOUS EVERY 5 MIN PRN
Status: DISCONTINUED | OUTPATIENT
Start: 2017-05-09 | End: 2017-05-10 | Stop reason: HOSPADM

## 2017-05-09 RX ORDER — NALOXONE HYDROCHLORIDE 0.4 MG/ML
.1-.4 INJECTION, SOLUTION INTRAMUSCULAR; INTRAVENOUS; SUBCUTANEOUS
Status: DISCONTINUED | OUTPATIENT
Start: 2017-05-09 | End: 2017-05-10 | Stop reason: HOSPADM

## 2017-05-09 RX ORDER — LIDOCAINE 40 MG/G
CREAM TOPICAL
Status: DISCONTINUED | OUTPATIENT
Start: 2017-05-09 | End: 2017-05-10 | Stop reason: HOSPADM

## 2017-05-09 RX ORDER — LORAZEPAM 2 MG/ML
0.5 INJECTION INTRAMUSCULAR EVERY 4 HOURS PRN
Status: CANCELLED
Start: 2017-05-15

## 2017-05-09 RX ORDER — ONDANSETRON 2 MG/ML
4 INJECTION INTRAMUSCULAR; INTRAVENOUS EVERY 30 MIN PRN
Status: DISCONTINUED | OUTPATIENT
Start: 2017-05-09 | End: 2017-05-10 | Stop reason: HOSPADM

## 2017-05-09 RX ORDER — MEPERIDINE HYDROCHLORIDE 25 MG/ML
12.5 INJECTION INTRAMUSCULAR; INTRAVENOUS; SUBCUTANEOUS
Status: DISCONTINUED | OUTPATIENT
Start: 2017-05-09 | End: 2017-05-10 | Stop reason: HOSPADM

## 2017-05-09 RX ADMIN — PROPOFOL 20 MG: 10 INJECTION, EMULSION INTRAVENOUS at 10:47

## 2017-05-09 RX ADMIN — HEPARIN SODIUM (PORCINE) LOCK FLUSH IV SOLN 100 UNIT/ML 5 ML: 100 SOLUTION at 10:51

## 2017-05-09 RX ADMIN — SODIUM CHLORIDE, SODIUM LACTATE, POTASSIUM CHLORIDE, CALCIUM CHLORIDE: 600; 310; 30; 20 INJECTION, SOLUTION INTRAVENOUS at 10:08

## 2017-05-09 RX ADMIN — GABAPENTIN 300 MG: 300 CAPSULE ORAL at 08:50

## 2017-05-09 RX ADMIN — Medication 22 ML: at 10:52

## 2017-05-09 RX ADMIN — ACETAMINOPHEN 975 MG: 325 TABLET ORAL at 08:50

## 2017-05-09 RX ADMIN — PROPOFOL: 10 INJECTION, EMULSION INTRAVENOUS at 10:34

## 2017-05-09 RX ADMIN — PROPOFOL 70 MCG/KG/MIN: 10 INJECTION, EMULSION INTRAVENOUS at 10:16

## 2017-05-09 RX ADMIN — SODIUM CHLORIDE: 9 INJECTION, SOLUTION INTRAVENOUS at 08:49

## 2017-05-09 RX ADMIN — LIDOCAINE HYDROCHLORIDE 80 MG: 20 INJECTION, SOLUTION INFILTRATION; PERINEURAL at 10:12

## 2017-05-09 RX ADMIN — PROPOFOL 20 MG: 10 INJECTION, EMULSION INTRAVENOUS at 10:51

## 2017-05-09 ASSESSMENT — ENCOUNTER SYMPTOMS: DYSRHYTHMIAS: 1

## 2017-05-09 ASSESSMENT — LIFESTYLE VARIABLES: TOBACCO_USE: 1

## 2017-05-09 NOTE — ANESTHESIA CARE TRANSFER NOTE
Patient: Reuben Padilla    Procedure(s):  Single Lumen Chest Power Port - Wound Class: I-Clean    Diagnosis: Cancer of Distal Third of Esophagus  Diagnosis Additional Information: No value filed.    Anesthesia Type:   MAC     Note:  Airway :Room Air  Patient transferred to:Phase II  Comments: Arrive Phase II, Stable, Airway Intact  104/69, 58,16,98,97.2  All questions answered.      Vitals: (Last set prior to Anesthesia Care Transfer)    CRNA VITALS  5/9/2017 1033 - 5/9/2017 1108      5/9/2017             Pulse: 63    SpO2: 96 %    Resp Rate (set): 10                Electronically Signed By: PADMA Beltran CRNA  May 9, 2017  11:08 AM

## 2017-05-09 NOTE — IP AVS SNAPSHOT
MRN:[id number]                      After Visit Summary   5/9/2017    Reuben Padilla    MRN: [id number]           Thank you!     Thank you for choosing [facility] for your care. Our goal is always to provide you with excellent care. Hearing back from our patients is one way we can continue to improve our services. Please take a few minutes to complete the written survey that you may receive in the mail after you visit with us. Thank you!        Patient Information     Date Of Birth          1960        About your hospital stay     You were admitted on:  May 9, 2017 You last received care in the:  [facility]    You were discharged on:  May 9, 2017       Who to Call     For medical emergencies, please call 911.  For non-urgent questions about your medical care, please call your primary care provider or clinic, None  For questions related to your surgery, please call your surgery clinic        Attending Provider     Provider Specialty    Jasiel Hu PA-C Radiology       Primary Care Provider    Unknown Primary MD Sydney       No address on file        Your next 10 appointments already scheduled     May 15, 2017  6:30 AM CDT   [facility] Lab Draw with  [facility] LAB DRAW   [facility] ([facility])    [address] [place] [id number] [phone]            May 15, 2017  7:00 AM CDT   Infusion 240 with  ONCOLOGY INFUSION, UC 10 ATC   [facility] ([facility])    [address] [place] [id number] [phone]              Further instructions from your care team         A collaboration between [facility] and [facility]  Experts in minimally invasive, targeted treatments performed using imaging guidance    Venous Access Device,  Port Catheter or Tunneled Central Line  Placement    Today you had a procedure today to install a venous access device; either a tunneled central vein catheter or a subcutaneous port catheter.    One of our Radiology PAs performed this procedure for you today:    ? WAYNE Shen, SWAPNIL      After you go home:  - Drink plenty of fluids.  Generally 6-8 (8 ounce) glasses a day is recommended.  - Resume your regular diet unless otherwise ordered by a medical provider.  - Keep any applied tape/gauze dressings clean and dry.  Change tape/gauze dressings if they get wet or soiled.  - You may shower the following day after procedure, however cover and protect from moisture any tape/gauze dressings.  You may let water hit and run over dried skin glue, but do not scrub.  Pat the area dry after showering.  - Port placement incisions are closed with absorbable suture, meaning they do not need to be removed at a later date, and a topical skin adhesive (skin glue).  This glue will wear off in 7-14 days.  Do not remove before this time.  If 14 days have passed and residual glue is present, you may gently remove it.  - Do not apply gels, lotions, or ointments to the glue site for the first 10 days as this may cause the glue to prematurely soften and fail.  - Do not perform strenuous activities or lift greater than 10 pounds for the next three days.  - If there is bleeding or oozing from the procedure site, apply firm pressure to the area for 5-10 minutes.  If the bleeding continues seek medical advice at the numbers below.  - Mild procedure site discomfort can be treated with an ice pack and over-the-counter pain relievers.        For 24 hours after any sedation used:  - Relax and take it easy.  No strenuous activities.  - Do not drive or operate machines at home or at work.  - No alcohol consumption.  - Do not make any important or legal decisions.    Call our Interventional Radiology (IR) service if:  - If you start bleeding from the procedure site.  If you  do start to bleed from the site, lie down and hold some pressure on the site.  Our radiology provider can help you decide if you need to return to the hospital.  - If you have new or worsening pain related to the procedure.  - If you have concerning swelling at the procedure site.  - If you develop persistent nausea or vomiting.  - If you develop hives or a rash or any unexplained itching.  - If you have a fever of greater than 100.5  F and chills in the first 5 days after procedure.  - Any other concerns related to your procedure.      [facility]  Interventional Radiology (IR)  [address]  [address] [facility], [facility] [place] [address]    Contact Number:  [phone]  (IR control desk)  - Monday - Friday 8:00 am - 4:30 pm    After hours for urgent concerns:  [phone]  - After 4:30 pm Monday - Friday, Weekends and Holidays.   - Ask for Interventional Radiology on-call.  Someone is available 24 hours a day.  - [facility] toll free number:  [phone]    [facility]  Home Care Following Anesthesia  For 24 hours after surgery:  1. Get plenty of rest.  A responsible adult must stay with you for at least 24 hours after you leave the surgery center.  2. Do not drive or use heavy equipment.  If you have weakness or tingling, don't drive or use heavy equipment until this feeling goes away.   3. Do not drink alcohol.   4. Avoid strenuous or risky activities.  Ask for help when climbing stairs.  5. You may feel lightheaded.  IF so, sit for a few minutes before standing.  Have someone help you get up.   6. If you have nausea (feel sick to your stomach): Drink only clear liquids such as apple juice, ginger ale, broth or 7-Up.  Rest may also help.  Be sure to drink enough fluids.  Move to a regular diet as you feel able.   7. You may have a slight fever.  Call the doctor if your fever is over 100 F (37.7 C) (taken under the tongue) or lasts longer  "than 24 hours.  8. You may have a dry mouth, a sore throat, muscle aches or trouble sleeping. These should go away after 24 hours.  9. Do not make important or legal decisions.               Tips for taking pain medications  To get the best pain relief possible, remember these points:    Take pain medications as directed, before pain becomes severe.    Pain medication can upset your stomach: taking it with food may help.    Constipation is a common side effect of pain medication. Drink plenty of  fluids.    Eat foods high in fiber. Take a stool softener if recommended by your doctor or pharmacist.    Do not drink alcohol, drive or operate machinery while taking pain medications.    Ask about other ways to control pain, such as with heat, ice or relaxation.    Call a doctor for any of the followin. Signs of infection (fever, growing tenderness at the surgery site, a large amount of drainage or bleeding, severe pain, foul-smelling drainage, redness, swelling).  2. It has been over 8 to 10 hours since surgery and you are still not able to urinate (pass water).  3. Headache for over 24 hours.  4. Numbness, tingling or weakness the day after surgery (if you had spinal anesthesia).  For emergency care, call the:  [facility]:  [phone] (TTY for hearing impaired: [phone])                  Pending Results     Date and Time Order Name Status Description    2017 0940 IR CHEST PORT PLACEMENT > 5 YRS OF AGE In process             Admission Information     Date & Time Provider Department Dept. Phone    2017 Jasiel Hu PA-C [facility] [phone]      Your Vitals Were     Blood Pressure Temperature Respirations Height Weight Pulse Oximetry    104/62 97.2  F (36.2  C) (Temporal) 18 1.981 m (6' 6\") 168.7 kg (372 lb) 98%    BMI (Body Mass Index)                   42.99 kg/m2           [employer] Information     [employer] gives you secure access to your " electronic health record. If you see a primary care provider, you can also send messages to your care team and make appointments. If you have questions, please call your primary care clinic.  If you do not have a primary care provider, please call [phone] and they will assist you.      [employer] is an electronic gateway that provides easy, online access to your medical records. With [employer], you can request a clinic appointment, read your test results, renew a prescription or communicate with your care team.     To access your existing account, please contact your [facility] or call [phone] for assistance.        Care EveryWhere ID     This is your Care EveryWhere ID. This could be used by other organizations to access your [facility] medical records  [id number]           Review of your medicines      CONTINUE these medicines which have NOT CHANGED        Dose / Directions    fish oil-omega-3 fatty acids 1000 MG capsule        Dose:  2 g   Take 2 g by mouth daily   Refills:  0       IBUPROFEN PO        Dose:  800 mg   Take 800 mg by mouth every 8 hours as needed for moderate pain   Refills:  0       latanoprost 0.005 % ophthalmic solution   Commonly known as:  XALATAN   Used for:  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)        Place into both eyes At Bedtime   Refills:  0       LORazepam 0.5 MG tablet   Commonly known as:  ATIVAN   Used for:  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), 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        Dose:  0.5 mg   Take 1 tablet (0.5 mg) by mouth every 6 hours as needed for anxiety   Quantity:  60 tablet   Refills:  1       Multi-vitamin Tabs tablet        Dose:  1 tablet   Take 1 tablet by mouth daily   Refills:  0       timolol 0.5 % ophthalmic solution   Commonly known as:  TIMOPTIC   Used for:  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)        Place into both eyes daily   Refills:  0       zolpidem 5 MG tablet   Commonly known  as:  AMBIEN   Used for:  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), 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             Medication List: This is a list of all your medications and when to take them. Check marks below indicate your daily home schedule. Keep this list as a reference.      Medications           Morning Afternoon Evening Bedtime As Needed    fish oil-omega-3 fatty acids 1000 MG capsule   Take 2 g by mouth daily                                IBUPROFEN PO   Take 800 mg by mouth every 8 hours as needed for moderate pain                                latanoprost 0.005 % ophthalmic solution   Commonly known as:  XALATAN   Place into both eyes At Bedtime                                LORazepam 0.5 MG tablet   Commonly known as:  ATIVAN   Take 1 tablet (0.5 mg) by mouth every 6 hours as needed for anxiety                                Multi-vitamin Tabs tablet   Take 1 tablet by mouth daily                                timolol 0.5 % ophthalmic solution   Commonly known as:  TIMOPTIC   Place into both eyes daily                                zolpidem 5 MG tablet   Commonly known as:  AMBIEN   Take 1-2 tablets each evening as needed

## 2017-05-09 NOTE — ANESTHESIA POSTPROCEDURE EVALUATION
Patient: Reuben Padilla    Procedure(s):  Single Lumen Chest Power Port - Wound Class: I-Clean    Diagnosis:Cancer of Distal Third of Esophagus  Diagnosis Additional Information: No value filed.    Anesthesia Type:  MAC    Note:  Anesthesia Post Evaluation    Patient location during evaluation: Phase 2  Patient participation: Able to fully participate in evaluation  Level of consciousness: awake and alert  Pain management: adequate  Airway patency: patent  Cardiovascular status: acceptable  Respiratory status: acceptable  Hydration status: acceptable  PONV: none     Anesthetic complications: None          Last vitals:  Vitals:    05/09/17 0827   BP: 135/84   Resp: 18   Temp: 36.3  C (97.4  F)   SpO2: 94%         Electronically Signed By: Arnold Hess DO  May 9, 2017  11:08 AM

## 2017-05-09 NOTE — PROCEDURES
Interventional Radiology Brief Post Procedure Note    Procedure: @FVRISFRMTLINK([id number])@    Proceduralist: Jasiel Hu PA-C and None    Assistant: None    Time Out: Prior to the start of the procedure and with procedural staff participation, I verbally confirmed the patient s identity using two indicators, relevant allergies, that the procedure was appropriate and matched the consent or emergent situation, and that the correct equipment/implants were available. Immediately prior to starting the procedure I conducted the Time Out with the procedural staff and re-confirmed the patient s name, procedure, and site/side. (The Joint Commission universal protocol was followed.)  Yes    Sedation: Monitored Anesthesia Care (MAC) administered and documented by Anesthesia Care Provider    Findings: Completed placement of an 8 Ukrainian 26 cm, Bard ClearVUE Slim brand, single lumen venous chest PowerPort via RIJV. Tip lying in the right atrium. PORT is ready for immediate use. Dx: Cancer of distal third of esophagus. Mayte. MAC 1.0    Estimated Blood Loss: Minimal    Fluoroscopy Time: Less than 5 minutes    SPECIMENS: None    Complications: 1. None     Condition: Stable    Plan:     Comments: See dictated procedure note for full details.    Jasiel Hu PA-C

## 2017-05-09 NOTE — IP AVS SNAPSHOT
[facility]    [address] [place] [id number]    Phone:  [phone]    Fax:  [phone]                                       After Visit Summary   5/9/2017    Reuben Padilla    MRN: [id number]           After Visit Summary Signature Page     I have received my discharge instructions, and my questions have been answered. I have discussed any challenges I see with this plan with the nurse or doctor.    ..........................................................................................................................................  Patient/Patient Representative Signature      ..........................................................................................................................................  Patient Representative Print Name and Relationship to Patient    ..................................................               ................................................  Date                                            Time    ..........................................................................................................................................  Reviewed by Signature/Title    ...................................................              ..............................................  Date                                                            Time

## 2017-05-09 NOTE — ANESTHESIA PREPROCEDURE EVALUATION
Anesthesia Evaluation     . Pt has had prior anesthetic. Type: General           ROS/MED HX    ENT/Pulmonary:  - neg pulmonary ROS   (+)tobacco use, Past use , . .   (-) sleep apnea   Neurologic:     (+)neuropathy - B/L LE stocking up to mid ankles,     Cardiovascular:     (+) ----. : . . . :. dysrhythmias a-fib, . Previous cardiac testing Echodate:2017results:[id number]  [id number]  [id number]  [id number]^KHOI^MALAIKA^KENNEDY           [facility]  Diagnostic and Treamtent-3rd Floor  [address] [place] [address]     Name: KORI CHANEY  MRN: [id number]  : 1960  Study Date: 2017 11:10 AM  Age: 56 yrs  Gender: Male  [place] [facility] [place]  Reason For Study: Malignant neoplasm of lower third of esophagus, Secondary  and  Ordering Physician: MALAIKA WESTFALL  Referring Physician: MALAIKA WESTFALL  Performed By: Edward Watson RDCS     BSA: 2.9 m2  Height: 78 in  Weight: 372 lb  _____________________________________________________________________________  __        Procedure  Echocardiogram with two-dimensional, color and spectral Doppler performed.  Contrast Optison. Optison (NDC #8449-8817-44) given intravenously. Patient was  given 3 ml mixture of 3 ml Optison and 6 ml saline. 6 ml wasted.  _____________________________________________________________________________  __        Interpretation Summary  Global and regional left ventricular function is normal with an EF of 60-65%.  LVEF 65% (traced).  The right ventricle is normal size. Global right ventricular function is  normal.  No significant valvular abnormalities.  The sinuses of valsalva are mildly dilated at 3.9 cm. Ascending aorta is upper  limits of normal in size at 3.8 cm.  The inferior vena cava was normal in size with preserved respiratory  variability. Estimated mean right atrial pressure is <3 mmHg.     Previous study not available for comparison.     I have personally viewed the  imaging and agree with the interpretation and  report as documented by the fellow, Dr. Thomas, and/or edited by me.  _____________________________________________________________________________  __        Left Ventricle  Left ventricular wall thickness is normal. Left ventricular size is normal.  Global and regional left ventricular function is normal with an EF of 60-65%.  LVEF 65% (traced). Normal left ventricular filling for age. No regional wall  motion abnormalities are seen.     Right Ventricle  The right ventricle is normal size. Global right ventricular function is  normal.     Atria  Both atria appear normal. The atrial septum is intact as assessed by color  Doppler .        Mitral Valve  The mitral valve is normal. Trace mitral insufficiency is present.     Aortic Valve  Aortic valve is normal in structure and function. The aortic valve is  tricuspid.     Tricuspid Valve  The tricuspid valve is normal. Trace tricuspid insufficiency is present.     Pulmonic Valve  The pulmonic valve is normal. Trace pulmonic insufficiency is present.     Vessels  The sinuses of valsalva are mildly dilated at 3.9 cm. Ascending aorta is upper  limits of normal in size at 3.8 cm. The inferior vena cava was normal in size  with preserved respiratory variability. The pulmonary artery is normal.  Sinuses of Valsalva 3.9 cm. Ascending aorta 3.8 cm. Estimated mean right  atrial pressure is <3 mmHg.     Pericardium  No pericardial effusion is present.        Compared to Previous Study  Previous study not available for comparison.     Attestation  I have personally viewed the imaging and agree with the interpretation and  report as documented by the fellow, Dr. Thomas, and/or edited by me.  _____________________________________________________________________________  __     MMode/2D Measurements & Calculations  IVSd: 0.92 cm  LVIDd: 5.3 cm  LVIDs: 3.7 cm  LVPWd: 0.71 cm  FS: 31.2 %  EDV(Teich): 136.4 ml  ESV(Teich): 56.6 ml  LV  mass(C)d: 153.7 grams  LV mass(C)dI: 52.3 grams/m2  Ao root diam: 3.8 cm  asc Aorta Diam: 3.8 cm  LVOT diam: 2.5 cm  LVOT area: 5.0 cm2  LA Volume (BP): 98.7 ml     LA Volume Index (BP): 33.6 ml/m2        Doppler Measurements & Calculations  MV E max ivy: 98.2 cm/sec  MV A max ivy: 86.3 cm/sec  MV E/A: 1.1  MV dec time: 0.18 sec  Lateral E/e': 9.4  Medial E/e': 11.1     _____________________________________________________________________________  __           Report approved by: Temitope Lee 05/08/2017 12:54 PM  date: results:ECG reviewed date:5/2/2017 results:afib date: results:          METS/Exercise Tolerance:     Hematologic:  - neg hematologic  ROS       Musculoskeletal:  - neg musculoskeletal ROS       GI/Hepatic: Comment: Esophageal cancer, dysphagia to solids not liquids, intermittent regurgitation - neg GI/hepatic ROS       Renal/Genitourinary:         Endo:     (+) Obesity, .      Psychiatric:  - neg psychiatric ROS       Infectious Disease:  - neg infectious disease ROS       Malignancy:   (+) Malignancy History of Other          Other:                     Physical Exam  Normal systems: pulmonary and dental    Airway   Mallampati: III  TM distance: <3 FB  Neck ROM: full    Dental     Cardiovascular   Rhythm and rate: irregular and normal      Pulmonary                     Anesthesia Plan      History & Physical Review  History and physical reviewed and following examination; no interval change.    ASA Status:  3 .        Plan for MAC with Intravenous induction. Maintenance will be TIVA.           Postoperative Care      Consents  Anesthetic plan, risks, benefits and alternatives discussed with:  Patient..            Procedure: Procedure(s):  Single Lumen Chest Power Port - Wound Class: I-Clean    HPI: Reuben Padilla is a 56 year old male with history of esophageal cancer and atrial fibrillation who is scheduled for the above procedure.     PMHx/PSHx:  Past Medical History:   Diagnosis Date      "Atrial fibrillation (H)      Cancer (H)     esophageal     Glaucoma      Glaucoma      Past Surgical History:   Procedure Laterality Date     AMPUTATION      toe     ARTHROSCOPY KNEE       bunion surgery       CHOLECYSTECTOMY       Social History   Substance Use Topics     Smoking status: Former Smoker     Smokeless tobacco: Never Used     Alcohol use Yes      Comment: occasional     Allergies   Allergen Reactions     Penicillin G Other (See Comments)     Pt not sure-     Penicillins Unknown       (Not in a hospital admission)  Current Outpatient Prescriptions   Medication Sig Dispense Refill     LORazepam (ATIVAN) 0.5 MG tablet Take 1 tablet (0.5 mg) by mouth every 6 hours as needed for anxiety 60 tablet 1     zolpidem (AMBIEN) 5 MG tablet Take 1-2 tablets each evening as needed 60 tablet 1     multivitamin, therapeutic with minerals (MULTI-VITAMIN) TABS tablet Take 1 tablet by mouth daily       fish oil-omega-3 fatty acids 1000 MG capsule Take 2 g by mouth daily       IBUPROFEN PO Take 800 mg by mouth every 8 hours as needed for moderate pain       latanoprost (XALATAN) 0.005 % ophthalmic solution Place into both eyes At Bedtime       timolol (TIMOPTIC) 0.5 % ophthalmic solution Place into both eyes daily       Objective: /84  Temp 36.3  C (97.4  F) (Temporal)  Resp 18  Ht 1.981 m (6' 6\")  Wt (!) 168.7 kg (372 lb)  SpO2 94%  BMI 42.99 kg/m2    Lab Results   Component Value Date    WBC 5.5 04/27/2017    HGB 14.8 04/27/2017    HCT 44.8 04/27/2017     04/27/2017     04/27/2017    POTASSIUM 4.4 04/27/2017    CHLORIDE 108 04/27/2017    CO2 25 04/27/2017    BUN 11 04/27/2017    CR 0.76 04/27/2017    GLC 94 04/27/2017    AST 23 04/27/2017    ALT 26 04/27/2017    ALKPHOS 114 04/27/2017    BILITOTAL 0.6 04/27/2017    INR 1.0 05/02/2017         "

## 2017-05-09 NOTE — DISCHARGE INSTRUCTIONS
A collaboration between [facility] and [facility]  Experts in minimally invasive, targeted treatments performed using imaging guidance    Venous Access Device,  Port Catheter or Tunneled Central Line Placement    Today you had a procedure today to install a venous access device; either a tunneled central vein catheter or a subcutaneous port catheter.    One of our Radiology PAs performed this procedure for you today:    ? WAYNE Shen, SWAPNIL      After you go home:  - Drink plenty of fluids.  Generally 6-8 (8 ounce) glasses a day is recommended.  - Resume your regular diet unless otherwise ordered by a medical provider.  - Keep any applied tape/gauze dressings clean and dry.  Change tape/gauze dressings if they get wet or soiled.  - You may shower the following day after procedure, however cover and protect from moisture any tape/gauze dressings.  You may let water hit and run over dried skin glue, but do not scrub.  Pat the area dry after showering.  - Port placement incisions are closed with absorbable suture, meaning they do not need to be removed at a later date, and a topical skin adhesive (skin glue).  This glue will wear off in 7-14 days.  Do not remove before this time.  If 14 days have passed and residual glue is present, you may gently remove it.  - Do not apply gels, lotions, or ointments to the glue site for the first 10 days as this may cause the glue to prematurely soften and fail.  - Do not perform strenuous activities or lift greater than 10 pounds for the next three days.  - If there is bleeding or oozing from the procedure site, apply firm pressure to the area for 5-10 minutes.  If the bleeding continues seek medical advice at the numbers below.  - Mild procedure site discomfort can be treated with an ice pack and over-the-counter pain relievers.        For 24 hours after any sedation used:  - Relax and take it easy.  No strenuous  activities.  - Do not drive or operate machines at home or at work.  - No alcohol consumption.  - Do not make any important or legal decisions.    Call our Interventional Radiology (IR) service if:  - If you start bleeding from the procedure site.  If you do start to bleed from the site, lie down and hold some pressure on the site.  Our radiology provider can help you decide if you need to return to the hospital.  - If you have new or worsening pain related to the procedure.  - If you have concerning swelling at the procedure site.  - If you develop persistent nausea or vomiting.  - If you develop hives or a rash or any unexplained itching.  - If you have a fever of greater than 100.5  F and chills in the first 5 days after procedure.  - Any other concerns related to your procedure.      [facility]  Interventional Radiology (IR)  [address]  2nd [facility] [place] [address]    Contact Number:  [phone]  (IR control desk)  - Monday - Friday 8:00 am - 4:30 pm    After hours for urgent concerns:  [phone]  - After 4:30 pm Monday - Friday, Weekends and Holidays.   - Ask for Interventional Radiology on-call.  Someone is available 24 hours a day.  - [facility] toll free number:  [phone]    [facility]  Home Care Following Anesthesia  For 24 hours after surgery:  1. Get plenty of rest.  A responsible adult must stay with you for at least 24 hours after you leave the surgery center.  2. Do not drive or use heavy equipment.  If you have weakness or tingling, don't drive or use heavy equipment until this feeling goes away.   3. Do not drink alcohol.   4. Avoid strenuous or risky activities.  Ask for help when climbing stairs.  5. You may feel lightheaded.  IF so, sit for a few minutes before standing.  Have someone help you get up.   6. If you have nausea (feel sick to your stomach): Drink only clear liquids such as apple  juice, ginger ale, broth or 7-Up.  Rest may also help.  Be sure to drink enough fluids.  Move to a regular diet as you feel able.   7. You may have a slight fever.  Call the doctor if your fever is over 100 F (37.7 C) (taken under the tongue) or lasts longer than 24 hours.  8. You may have a dry mouth, a sore throat, muscle aches or trouble sleeping. These should go away after 24 hours.  9. Do not make important or legal decisions.               Tips for taking pain medications  To get the best pain relief possible, remember these points:    Take pain medications as directed, before pain becomes severe.    Pain medication can upset your stomach: taking it with food may help.    Constipation is a common side effect of pain medication. Drink plenty of  fluids.    Eat foods high in fiber. Take a stool softener if recommended by your doctor or pharmacist.    Do not drink alcohol, drive or operate machinery while taking pain medications.    Ask about other ways to control pain, such as with heat, ice or relaxation.    Call a doctor for any of the followin. Signs of infection (fever, growing tenderness at the surgery site, a large amount of drainage or bleeding, severe pain, foul-smelling drainage, redness, swelling).  2. It has been over 8 to 10 hours since surgery and you are still not able to urinate (pass water).  3. Headache for over 24 hours.  4. Numbness, tingling or weakness the day after surgery (if you had spinal anesthesia).  For emergency care, call the:  [facility]:  [phone] (TTY for hearing impaired: [phone])

## 2017-05-10 ENCOUNTER — TELEPHONE (OUTPATIENT)
Dept: ONCOLOGY | Facility: CLINIC | Age: 57
End: 2017-05-10

## 2017-05-10 LAB — COPATH REPORT: NORMAL

## 2017-05-10 NOTE — TELEPHONE ENCOUNTER
"I spoke to patient today; per [facility] they cannot secure a home care agency that can work with patient's chemo pump (5FU) at his home location in [place], [place]. Patient has decided he can stay locally at [facility] during treatment weeks (starts #1 Folfox on 5/15/17 [id number] & will stay w/ sister in [place] for cycle 1). He understands that he will return to [facility] on day 3 when his pump will be empty for a full pump disconnection.    Will send referral to [facility] for cycle 2.    Per [facility], if local non-chemo infusion or home care needs are required between treatments at [facility], we could potentially use \"[facility],\" which is based out of the local hospital in patient's home town [place]. Will consider this for future use if needed. Will not enroll with [facility] at this time.      "

## 2017-05-12 RX ORDER — ONDANSETRON 8 MG/1
8 TABLET, FILM COATED ORAL EVERY 8 HOURS PRN
Qty: 10 TABLET | Refills: 2 | Status: SHIPPED | OUTPATIENT
Start: 2017-05-12 | End: 2017-05-30

## 2017-05-12 RX ORDER — PROCHLORPERAZINE MALEATE 10 MG
10 TABLET ORAL EVERY 6 HOURS PRN
Qty: 30 TABLET | Refills: 2 | Status: SHIPPED | OUTPATIENT
Start: 2017-05-12 | End: 2018-01-08

## 2017-05-12 RX ORDER — LORAZEPAM 0.5 MG/1
0.5 TABLET ORAL EVERY 4 HOURS PRN
Qty: 30 TABLET | Refills: 2 | Status: SHIPPED | OUTPATIENT
Start: 2017-05-12 | End: 2017-06-27

## 2017-05-15 ENCOUNTER — APPOINTMENT (OUTPATIENT)
Dept: LAB | Facility: CLINIC | Age: 57
End: 2017-05-15
Attending: INTERNAL MEDICINE
Payer: COMMERCIAL

## 2017-05-15 ENCOUNTER — ALLIED HEALTH/NURSE VISIT (OUTPATIENT)
Dept: ONCOLOGY | Facility: CLINIC | Age: 57
End: 2017-05-15

## 2017-05-15 ENCOUNTER — INFUSION THERAPY VISIT (OUTPATIENT)
Dept: ONCOLOGY | Facility: CLINIC | Age: 57
End: 2017-05-15
Attending: INTERNAL MEDICINE
Payer: COMMERCIAL

## 2017-05-15 VITALS
RESPIRATION RATE: 18 BRPM | DIASTOLIC BLOOD PRESSURE: 82 MMHG | TEMPERATURE: 97.4 F | OXYGEN SATURATION: 98 % | HEART RATE: 95 BPM | WEIGHT: 315 LBS | SYSTOLIC BLOOD PRESSURE: 118 MMHG | BODY MASS INDEX: 42.17 KG/M2

## 2017-05-15 DIAGNOSIS — C15.5 CANCER OF DISTAL THIRD OF ESOPHAGUS (H): Primary | ICD-10-CM

## 2017-05-15 DIAGNOSIS — Z71.89 COUNSELING REGARDING ADVANCED DIRECTIVES: Primary | ICD-10-CM

## 2017-05-15 LAB
ALBUMIN SERPL-MCNC: 3.4 G/DL (ref 3.4–5)
ALP SERPL-CCNC: 95 U/L (ref 40–150)
ALT SERPL W P-5'-P-CCNC: 21 U/L (ref 0–70)
ANION GAP SERPL CALCULATED.3IONS-SCNC: 8 MMOL/L (ref 3–14)
AST SERPL W P-5'-P-CCNC: 22 U/L (ref 0–45)
BASOPHILS # BLD AUTO: 0 10E9/L (ref 0–0.2)
BASOPHILS NFR BLD AUTO: 0.5 %
BILIRUB SERPL-MCNC: 0.6 MG/DL (ref 0.2–1.3)
BUN SERPL-MCNC: 16 MG/DL (ref 7–30)
CALCIUM SERPL-MCNC: 8.8 MG/DL (ref 8.5–10.1)
CHLORIDE SERPL-SCNC: 110 MMOL/L (ref 94–109)
CO2 SERPL-SCNC: 24 MMOL/L (ref 20–32)
CREAT SERPL-MCNC: 0.79 MG/DL (ref 0.66–1.25)
DIFFERENTIAL METHOD BLD: NORMAL
EOSINOPHIL # BLD AUTO: 0.1 10E9/L (ref 0–0.7)
EOSINOPHIL NFR BLD AUTO: 1.9 %
ERYTHROCYTE [DISTWIDTH] IN BLOOD BY AUTOMATED COUNT: 12.5 % (ref 10–15)
GFR SERPL CREATININE-BSD FRML MDRD: ABNORMAL ML/MIN/1.7M2
GLUCOSE SERPL-MCNC: 116 MG/DL (ref 70–99)
HCT VFR BLD AUTO: 45.1 % (ref 40–53)
HGB BLD-MCNC: 15.3 G/DL (ref 13.3–17.7)
IMM GRANULOCYTES # BLD: 0 10E9/L (ref 0–0.4)
IMM GRANULOCYTES NFR BLD: 0.5 %
LYMPHOCYTES # BLD AUTO: 1.4 10E9/L (ref 0.8–5.3)
LYMPHOCYTES NFR BLD AUTO: 24.8 %
MCH RBC QN AUTO: 29.1 PG (ref 26.5–33)
MCHC RBC AUTO-ENTMCNC: 33.9 G/DL (ref 31.5–36.5)
MCV RBC AUTO: 86 FL (ref 78–100)
MONOCYTES # BLD AUTO: 0.4 10E9/L (ref 0–1.3)
MONOCYTES NFR BLD AUTO: 7.2 %
NEUTROPHILS # BLD AUTO: 3.8 10E9/L (ref 1.6–8.3)
NEUTROPHILS NFR BLD AUTO: 65.1 %
NRBC # BLD AUTO: 0 10*3/UL
NRBC BLD AUTO-RTO: 0 /100
PLATELET # BLD AUTO: 213 10E9/L (ref 150–450)
POTASSIUM SERPL-SCNC: 3.8 MMOL/L (ref 3.4–5.3)
PROT SERPL-MCNC: 7.3 G/DL (ref 6.8–8.8)
RBC # BLD AUTO: 5.26 10E12/L (ref 4.4–5.9)
SODIUM SERPL-SCNC: 142 MMOL/L (ref 133–144)
WBC # BLD AUTO: 5.8 10E9/L (ref 4–11)

## 2017-05-15 PROCEDURE — 96368 THER/DIAG CONCURRENT INF: CPT

## 2017-05-15 PROCEDURE — 85025 COMPLETE CBC W/AUTO DIFF WBC: CPT | Performed by: INTERNAL MEDICINE

## 2017-05-15 PROCEDURE — 96375 TX/PRO/DX INJ NEW DRUG ADDON: CPT

## 2017-05-15 PROCEDURE — 80053 COMPREHEN METABOLIC PANEL: CPT | Performed by: INTERNAL MEDICINE

## 2017-05-15 PROCEDURE — 25000125 ZZHC RX 250: Mod: ZF | Performed by: INTERNAL MEDICINE

## 2017-05-15 PROCEDURE — 25000128 H RX IP 250 OP 636: Mod: ZF | Performed by: INTERNAL MEDICINE

## 2017-05-15 PROCEDURE — 96411 CHEMO IV PUSH ADDL DRUG: CPT

## 2017-05-15 PROCEDURE — 25000128 H RX IP 250 OP 636: Mod: ZF

## 2017-05-15 PROCEDURE — 96413 CHEMO IV INFUSION 1 HR: CPT

## 2017-05-15 PROCEDURE — 96416 CHEMO PROLONG INFUSE W/PUMP: CPT

## 2017-05-15 PROCEDURE — 96415 CHEMO IV INFUSION ADDL HR: CPT

## 2017-05-15 RX ORDER — LIDOCAINE/PRILOCAINE 2.5 %-2.5%
CREAM (GRAM) TOPICAL PRN
Qty: 30 G | Refills: 3 | Status: SHIPPED | OUTPATIENT
Start: 2017-05-15 | End: 2020-01-06

## 2017-05-15 RX ORDER — HEPARIN SODIUM (PORCINE) LOCK FLUSH IV SOLN 100 UNIT/ML 100 UNIT/ML
5 SOLUTION INTRAVENOUS DAILY PRN
Status: DISCONTINUED | OUTPATIENT
Start: 2017-05-15 | End: 2017-05-15 | Stop reason: HOSPADM

## 2017-05-15 RX ORDER — FLUOROURACIL 50 MG/ML
400 INJECTION, SOLUTION INTRAVENOUS ONCE
Status: COMPLETED | OUTPATIENT
Start: 2017-05-15 | End: 2017-05-15

## 2017-05-15 RX ADMIN — DEXTROSE MONOHYDRATE 250 ML: 50 INJECTION, SOLUTION INTRAVENOUS at 08:06

## 2017-05-15 RX ADMIN — LEUCOVORIN CALCIUM 1050 MG: 500 INJECTION, POWDER, LYOPHILIZED, FOR SOLUTION INTRAMUSCULAR; INTRAVENOUS at 08:42

## 2017-05-15 RX ADMIN — SODIUM CHLORIDE, PRESERVATIVE FREE 5 ML: 5 INJECTION INTRAVENOUS at 06:41

## 2017-05-15 RX ADMIN — OXALIPLATIN 259 MG: 5 INJECTION, SOLUTION, CONCENTRATE INTRAVENOUS at 08:44

## 2017-05-15 RX ADMIN — FLUOROURACIL 1220 MG: 50 INJECTION, SOLUTION INTRAVENOUS at 11:06

## 2017-05-15 RX ADMIN — DEXAMETHASONE SODIUM PHOSPHATE: 10 INJECTION, SOLUTION INTRAMUSCULAR; INTRAVENOUS at 08:07

## 2017-05-15 ASSESSMENT — PAIN SCALES - GENERAL: PAINLEVEL: MILD PAIN (2)

## 2017-05-15 NOTE — PATIENT INSTRUCTIONS
Contact Numbers  [facility] Nurse Triage: [phone]  After Hours Nurse Line:  [phone]    Please call the [facility] Nurse Triage line or after hours number if you experience a temperature greater than or equal to 100.5, shaking chills, have uncontrolled nausea, vomiting and/or diarrhea, dizziness, shortness of breath, chest pain, bleeding, unexplained bruising, or if you have any other new/concerning symptoms, questions or concerns.     If you are having any concerning symptoms or wish to speak to a provider before your next infusion visit, please call your care coordinator or triage to notify them so we can adequately serve you.     If you need a refill on a narcotic prescription or other medication, please call triage before your infusion appointment.             May 2017   [place] Monday Tuesday Wednesday Thursday Friday Saturday        1     2     Admission    9:12 AM   Clemencia Carreon MD   [address] [facility]   (Discharge: 5/2/2017)     PROCEDURE - 4.5 HR    9:30 AM   (270 min.)   U2A ROOM 8   Unit 2A [facility]     IR LYMPH NODE BIOPSY   10:40 AM   (60 min.)   UUIR7   [facility], Interventional Radiology 3     4     5     6       7     8     [facility] NEW    7:45 AM   (60 min.)   Kadi Dee MD   [facility]     ECH COMPLETE   11:00 AM   (60 min.)   [id number]   [facility] Echo 9     Outpatient Visit    8:19 AM   [facility]     IR CHEST PORT PLACEMENT >5 YRS    8:30 AM   (60 min.)   [id number]   [facility] ASC Imaging     INSERT PORT VASCULAR ACCESS   10:00 AM   Jasiel Hu PA-C    OR 10     11     12     13       14     15     [facility]    6:30 AM   (15 min.)    [facility] LAB DRAW   [facility] Lab Draw     [facility] ONC INFUSION 240    7:00 AM   (240 min.)    ONCOLOGY INFUSION   [facility] 16     17     18     19     20       21     22     23     24     25     26     27       28     29      30     [facility]    6:30 AM   (15 min.)   [facility] LAB DRAW   [facility] Lab Draw     [facility] RETURN    6:45 AM   (50 min.)   Loraine Sutherland PA-C [facility] ONC INFUSION 240    7:00 AM   (240 min.)    ONCOLOGY INFUSION   [facility] 31 June 2017 Sunday Monday Tuesday Wednesday Thursday Friday Saturday                       1     2     3       4     5     6     7     8     9     10       11     12     13     14     15     16     17       18     19     20     21     22     23     24       25     26     27     28     29     30                      Recent Results (from the past 24 hour(s))   CBC with platelets differential    Collection Time: 05/15/17  7:14 AM   Result Value Ref Range    WBC 5.8 4.0 - 11.0 10e9/L    RBC Count 5.26 4.4 - 5.9 10e12/L    Hemoglobin 15.3 13.3 - 17.7 g/dL    Hematocrit 45.1 40.0 - 53.0 %    MCV 86 78 - 100 fl    MCH 29.1 26.5 - 33.0 pg    MCHC 33.9 31.5 - 36.5 g/dL    RDW 12.5 10.0 - 15.0 %    Platelet Count 213 150 - 450 10e9/L    Diff Method Automated Method     % Neutrophils 65.1 %    % Lymphocytes 24.8 %    % Monocytes 7.2 %    % Eosinophils 1.9 %    % Basophils 0.5 %    % Immature Granulocytes 0.5 %    Nucleated RBCs 0 0 /100    Absolute Neutrophil 3.8 1.6 - 8.3 10e9/L    Absolute Lymphocytes 1.4 0.8 - 5.3 10e9/L    Absolute Monocytes 0.4 0.0 - 1.3 10e9/L    Absolute Eosinophils 0.1 0.0 - 0.7 10e9/L    Absolute Basophils 0.0 0.0 - 0.2 10e9/L    Abs Immature Granulocytes 0.0 0 - 0.4 10e9/L    Absolute Nucleated RBC 0.0    Comprehensive metabolic panel    Collection Time: 05/15/17  7:14 AM   Result Value Ref Range    Sodium 142 133 - 144 mmol/L    Potassium 3.8 3.4 - 5.3 mmol/L    Chloride 110 (H) 94 - 109 mmol/L    Carbon Dioxide 24 20 - 32 mmol/L    Anion Gap 8 3 - 14 mmol/L    Glucose 116 (H) 70 - 99 mg/dL    Urea Nitrogen 16 7 - 30 mg/dL    Creatinine 0.79 0.66 - 1.25 mg/dL    GFR  Estimate >90  Non  GFR Calc   >60 mL/min/1.7m2    GFR Estimate If Black >90   GFR Calc   >60 mL/min/1.7m2    Calcium 8.8 8.5 - 10.1 mg/dL    Bilirubin Total 0.6 0.2 - 1.3 mg/dL    Albumin 3.4 3.4 - 5.0 g/dL    Protein Total 7.3 6.8 - 8.8 g/dL    Alkaline Phosphatase 95 40 - 150 U/L    ALT 21 0 - 70 U/L    AST 22 0 - 45 U/L

## 2017-05-15 NOTE — MR AVS SNAPSHOT
After Visit Summary   5/15/2017    Reuben Padilla    MRN: [id number]           Patient Information     Date Of Birth          1960        Visit Information        Provider Department      5/15/2017 11:00 AM Uma Constantino, [facility]        Today's Diagnoses     Counseling regarding advanced directives    -  1       Follow-ups after your visit        Your next 10 appointments already scheduled     May 30, 2017  6:30 AM CDT   [facility] Lab Draw with  [facility] DRAW   [facility] Lab Draw ([facility])    [address] [place] [id number] [phone]            May 30, 2017  7:00 AM CDT   Infusion 240 with UC ONCOLOGY INFUSION, UC 10 ATC   [facility] ([facility])    [address] [place] [address] [id number] [phone]            May 30, 2017  7:00 AM CDT   (Arrive by 6:45 AM)   Return Visit with Loraine Sutherland PA-C   [facility] ([facility])    [address] [place] [id number] [phone]              Who to contact     Please call your clinic at [phone] to:    Ask questions about your health    Make or cancel appointments    Discuss your medicines    Learn about your test results    Speak to your doctor   If you have compliments or concerns about an experience at your clinic, or if you wish to file a complaint, please contact [facility] Patient Relations at [phone] or email us at [email or web address]         Additional Information About Your Visit        MyChart Information     [employer] gives you secure access to your electronic health record. If you see a primary care provider, you can also send messages to your care team and make appointments. If you have questions, please call your primary care clinic.  If you do not have a primary  care provider, please call [phone] and they will assist you.      [employer] is an electronic gateway that provides easy, online access to your medical records. With [employer], you can request a clinic appointment, read your test results, renew a prescription or communicate with your care team.     To access your existing account, please contact your [facility] or call [phone] for assistance.        Care EveryWhere ID     This is your Care EveryWhere ID. This could be used by other organizations to access your [place] medical records  [id number]         Blood Pressure from Last 3 Encounters:   05/15/17 118/82   05/09/17 111/58   05/08/17 132/86    Weight from Last 3 Encounters:   05/15/17 (!) 165.5 kg (364 lb 14.4 oz)   05/09/17 (!) 168.7 kg (372 lb)   05/08/17 (!) 168.7 kg (372 lb)              Today, you had the following     No orders found for display         Today's Medication Changes          These changes are accurate as of: 5/15/17 11:59 PM.  If you have any questions, ask your nurse or doctor.               Start taking these medicines.        Dose/Directions    lidocaine-prilocaine cream   Commonly known as:  EMLA   Used for:  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)        Apply topically as needed for moderate pain   Quantity:  30 g   Refills:  3       ondansetron 8 MG tablet   Commonly known as:  ZOFRAN   Used for:  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)        Dose:  8 mg   Take 1 tablet (8 mg) by mouth every 8 hours as needed (Nausea/Vomiting)   Quantity:  10 tablet   Refills:  2       prochlorperazine 10 MG tablet   Commonly known as:  COMPAZINE   Used for:  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)        Dose:  10 mg   Take 1 tablet (10 mg) by mouth every 6 hours as needed (Nausea/Vomiting)   Quantity:  30 tablet   Refills:  2         These medicines have changed or have updated prescriptions.        Dose/Directions    * LORazepam 0.5 MG tablet   Commonly known  as:  ATIVAN   This may have changed:  Another medication with the same name was added. Make sure you understand how and when to take each.   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 Make sure you understand how and when to take each.   Used for:  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)        Dose:  0.5 mg   Take 1 tablet (0.5 mg) by mouth every 4 hours as needed (Anxiety, Nausea/Vomiting or Sleep)   Quantity:  30 tablet   Refills:  2       * Notice:  This list has 2 medication(s) that are the same as other medications prescribed for you. Read the directions carefully, and ask your doctor or other care provider to review them with you.         Where to get your medicines      These medications were sent to [address] [address]    Hours:  TRANSPLANT PHONE NUMBER [phone] Phone:  [phone]     lidocaine-prilocaine cream    ondansetron 8 MG tablet    prochlorperazine 10 MG tablet         Some of these will need a paper prescription and others can be bought over the counter.  Ask your nurse if you have questions.     Bring a paper prescription for each of these medications     LORazepam 0.5 MG tablet                Primary Care Provider    Unknown Primary MD Sydney       No address on file        Thank you!     Thank you for choosing [facility]  for your care. Our goal is always to provide you with excellent care. Hearing back from our patients is one way we can continue to improve our  services. Please take a few minutes to complete the written survey that you may receive in the mail after your visit with us. Thank you!             Your Updated Medication List - Protect others around you: Learn how to safely use, store and throw away your medicines at www.disposemymeds.org.          This list is accurate as of: 5/15/17 11:59 PM.  Always use your most recent med list.                   Brand Name Dispense Instructions for use    fish oil-omega-3 fatty acids 1000 MG capsule      Take 2 g by mouth daily       IBUPROFEN PO      Take 800 mg by mouth every 8 hours as needed for moderate pain       latanoprost 0.005 % ophthalmic solution    XALATAN     Place into both eyes At Bedtime       lidocaine-prilocaine cream    EMLA    30 g    Apply topically as needed for moderate pain       * LORazepam 0.5 MG tablet    ATIVAN    60 tablet    Take 1 tablet (0.5 mg) by mouth every 6 hours as needed for anxiety       * LORazepam 0.5 MG tablet    ATIVAN    30 tablet    Take 1 tablet (0.5 mg) by mouth every 4 hours as needed (Anxiety, Nausea/Vomiting or Sleep)       Multi-vitamin Tabs tablet      Take 1 tablet by mouth daily       ondansetron 8 MG tablet    ZOFRAN    10 tablet    Take 1 tablet (8 mg) by mouth every 8 hours as needed (Nausea/Vomiting)       prochlorperazine 10 MG tablet    COMPAZINE    30 tablet    Take 1 tablet (10 mg) by mouth every 6 hours as needed (Nausea/Vomiting)       timolol 0.5 % ophthalmic solution    TIMOPTIC     Place into both eyes daily       zolpidem 5 MG tablet    AMBIEN    60 tablet    Take 1-2 tablets each evening as needed       * Notice:  This list has 2 medication(s) that are the same as other medications prescribed for you. Read the directions carefully, and ask your doctor or other care provider to review them with you.

## 2017-05-15 NOTE — NURSING NOTE
"Chief Complaint   Patient presents with     Port Draw     powerport used to access port-pt had to be reaccessed due to minimal blood return      Line flushed with NS and heparin, labs collected and sent, vitals taken and pt checked in for next appt. Pt reports hx of nausea /passing out with blood draw-first accession pt laid back for preventative measures with cold compress to forehead-small amount of emesis-flushing well but minimal blood and pt stated\"it is painful\" so de-accessed and had Carley RN place 2nd powerport with successful blood return. Anais Rodriguez    "

## 2017-05-15 NOTE — MR AVS SNAPSHOT
After Visit Summary   5/15/2017    Reuben Padilla    MRN: [id number]           Patient Information     Date Of Birth          1960        Visit Information        Provider Department      5/15/2017 7:00 AM UC 10 ATC; UC ONCOLOGY INFUSION [facility]        Today's Diagnoses     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)    -  1      Care Instructions    Contact Numbers  [facility] Nurse Triage: [phone]  After Hours Nurse Line:  [phone]    Please call the [facility] Nurse Triage line or after hours number if you experience a temperature greater than or equal to 100.5, shaking chills, have uncontrolled nausea, vomiting and/or diarrhea, dizziness, shortness of breath, chest pain, bleeding, unexplained bruising, or if you have any other new/concerning symptoms, questions or concerns.     If you are having any concerning symptoms or wish to speak to a provider before your next infusion visit, please call your care coordinator or triage to notify them so we can adequately serve you.     If you need a refill on a narcotic prescription or other medication, please call triage before your infusion appointment.             May 2017   [place] Monday Tuesday Wednesday Thursday Friday Saturday        1     2     Admission    9:12 AM   Clemencia Carreon MD   [address] [facility]   (Discharge: 5/2/2017)     PROCEDURE - 4.5 HR    9:30 AM   (270 min.)   U2A ROOM 8   [facility]     IR LYMPH NODE BIOPSY   10:40 AM   (60 min.)   UUIR7   [facility] [place], Interventional Radiology 3     4     5     6       7     8     [facility] NEW    7:45 AM   (60 min.)   Kadi Dee MD   [facility]     ECH COMPLETE   11:00 AM   (60 min.)   [id number]   [facility] Echo 9     Outpatient Visit    8:19 AM   [facility]     IR CHEST PORT PLACEMENT >5 YRS    8:30 AM   (60 min.)   [id number]   [facility] ASC Imaging     INSERT PORT VASCULAR ACCESS   10:00  AM   Jasiel Hu PA-C    OR 10     11     12     13       14     15     [facility] LAB DRAW    6:30 AM   (15 min.)    [facility] LAB DRAW   [facility] Lab Draw     [facility] ONC INFUSION 240    7:00 AM   (240 min.)    ONCOLOGY INFUSION   [facility] 16     17     18     19     20       21     22     23     24     25     26     27       28     29     30     [facility] DRAW    6:30 AM   (15 min.)    [facility] LAB DRAW   [facility] Lab Draw     [facility] RETURN    6:45 AM   (50 min.)   Loraine Sutherland [facility]   [facility]    7:00 AM   (240 min.)    ONCOLOGY INFUSION   [facility] 31 June 2017 Sunday Monday Tuesday Wednesday Thursday Friday Saturday                       1     2     3       4     5     6     7     8     9     10       11     12     13     14     15     16     17       18     19     20     21     22     23     24       25     26     27     28     29     30                      Recent Results (from the past 24 hour(s))   CBC with platelets differential    Collection Time: 05/15/17  7:14 AM   Result Value Ref Range    WBC 5.8 4.0 - 11.0 10e9/L    RBC Count 5.26 4.4 - 5.9 10e12/L    Hemoglobin 15.3 13.3 - 17.7 g/dL    Hematocrit 45.1 40.0 - 53.0 %    MCV 86 78 - 100 fl    MCH 29.1 26.5 - 33.0 pg    MCHC 33.9 31.5 - 36.5 g/dL    RDW 12.5 10.0 - 15.0 %    Platelet Count 213 150 - 450 10e9/L    Diff Method Automated Method     % Neutrophils 65.1 %    % Lymphocytes 24.8 %    % Monocytes 7.2 %    % Eosinophils 1.9 %    % Basophils 0.5 %    % Immature Granulocytes 0.5 %    Nucleated RBCs 0 0 /100    Absolute Neutrophil 3.8 1.6 - 8.3 10e9/L    Absolute Lymphocytes 1.4 0.8 - 5.3 10e9/L    Absolute Monocytes 0.4 0.0 - 1.3 10e9/L    Absolute Eosinophils 0.1 0.0 - 0.7 10e9/L    Absolute Basophils 0.0 0.0 - 0.2 10e9/L    Abs Immature Granulocytes 0.0 0 - 0.4 10e9/L    Absolute  Nucleated RBC 0.0    Comprehensive metabolic panel    Collection Time: 05/15/17  7:14 AM   Result Value Ref Range    Sodium 142 133 - 144 mmol/L    Potassium 3.8 3.4 - 5.3 mmol/L    Chloride 110 (H) 94 - 109 mmol/L    Carbon Dioxide 24 20 - 32 mmol/L    Anion Gap 8 3 - 14 mmol/L    Glucose 116 (H) 70 - 99 mg/dL    Urea Nitrogen 16 7 - 30 mg/dL    Creatinine 0.79 0.66 - 1.25 mg/dL    GFR Estimate >90  Non  GFR Calc   >60 mL/min/1.7m2    GFR Estimate If Black >90   GFR Calc   >60 mL/min/1.7m2    Calcium 8.8 8.5 - 10.1 mg/dL    Bilirubin Total 0.6 0.2 - 1.3 mg/dL    Albumin 3.4 3.4 - 5.0 g/dL    Protein Total 7.3 6.8 - 8.8 g/dL    Alkaline Phosphatase 95 40 - 150 U/L    ALT 21 0 - 70 U/L    AST 22 0 - 45 U/L               Follow-ups after your visit        Your next 10 appointments already scheduled     May 30, 2017  6:30 AM CDT   [facility]   [facility] Lab Draw ([facility])    [address] [address] [phone]            May 30, 2017  7:00 AM CDT   Infusion 240 with  ONCOLOGY INFUSION, UC 10 ATC   [facility] ([facility])    [address] [address] [phone]            May 30, 2017  7:00 AM CDT   (Arrive by 6:45 AM)   Return Visit with Loraine Sutherland PA-C   [facility] ([facility])    [address] [address] [phone]              Who to contact     If you have questions or need follow up information about today's clinic visit or your schedule please contact [facility] directly at [phone].  Normal or non-critical lab and imaging results will be communicated to you by MyChart, letter or phone within 4 business days after the clinic has received the results. If you do not hear from us within  7 days, please contact the clinic through [employer] or phone. If you have a critical or abnormal lab result, we will notify you by phone as soon as possible.  Submit refill requests through [employer] or call your pharmacy and they will forward the refill request to us. Please allow 3 business days for your refill to be completed.          Additional Information About Your Visit        [employer] Information     [employer] gives you secure access to your electronic health record. If you see a primary care provider, you can also send messages to your care team and make appointments. If you have questions, please call your primary care clinic.  If you do not have a primary care provider, please call [phone] and they will assist you.        Care EveryWhere ID     This is your Care EveryWhere ID. This could be used by other organizations to access your [place] medical records  [id number]        Your Vitals Were     Pulse Temperature Respirations Pulse Oximetry BMI (Body Mass Index)       95 97.4  F (36.3  C) (Oral) 18 98% 42.17 kg/m2        Blood Pressure from Last 3 Encounters:   05/15/17 118/82   05/09/17 111/58   05/08/17 132/86    Weight from Last 3 Encounters:   05/15/17 (!) 165.5 kg (364 lb 14.4 oz)   05/09/17 (!) 168.7 kg (372 lb)   05/08/17 (!) 168.7 kg (372 lb)              We Performed the Following     CBC with platelets differential     Comprehensive metabolic panel     MD INSTRUCTION FOR PROTOCOL     Treatment Conditions          Today's Medication Changes          These changes are accurate as of: 5/15/17 10:52 AM.  If you have any questions, ask your nurse or doctor.               Start taking these medicines.        Dose/Directions    lidocaine-prilocaine cream   Commonly known as:  EMLA   Used for:  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)        Apply topically as needed for moderate pain   Quantity:  30 g   Refills:  3       ondansetron 8 MG tablet   Commonly known as:  ZOFRAN   Used for:  Cancer of  distal third of esophagus (H)        Dose:  8 mg   Take 1 tablet (8 mg) by mouth every 8 hours as needed (Nausea/Vomiting)   Quantity:  10 tablet   Refills:  2       prochlorperazine 10 MG tablet   Commonly known as:  COMPAZINE   Used for:  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)        Dose:  10 mg   Take 1 tablet (10 mg) by mouth every 6 hours as needed (Nausea/Vomiting)   Quantity:  30 tablet   Refills:  2         These medicines have changed or have updated prescriptions.        Dose/Directions    * LORazepam 0.5 MG tablet   Commonly known as:  ATIVAN   This may have changed:  Another medication with the same name was added. Make sure you understand how and when to take each.   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 Make sure you understand how and when to take each.   Used for:  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)        Dose:  0.5 mg   Take 1 tablet (0.5 mg) by mouth every 4 hours as needed (Anxiety, Nausea/Vomiting or Sleep)   Quantity:  30 tablet   Refills:  2       * Notice:  This list has 2 medication(s) that are the same as other medications prescribed for you. Read the directions carefully, and ask your doctor or other care provider to review them with you.         Where to get your medicines      These medications were sent to [address] [address]    Hours:  TRANSPLANT PHONE NUMBER [phone] Phone:  [phone]     lidocaine-prilocaine cream    ondansetron 8 MG tablet     prochlorperazine 10 MG tablet         Some of these will need a paper prescription and others can be bought over the counter.  Ask your nurse if you have questions.     Bring a paper prescription for each of these medications     LORazepam 0.5 MG tablet                Primary Care Provider    Unknown Primary MD Sydney       No address on file        Thank you!     Thank you for choosing [facility]  for your care. Our goal is always to provide you with excellent care. Hearing back from our patients is one way we can continue to improve our services. Please take a few minutes to complete the written survey that you may receive in the mail after your visit with us. Thank you!             Your Updated Medication List - Protect others around you: Learn how to safely use, store and throw away your medicines at www.disposemymeds.org.          This list is accurate as of: 5/15/17 10:52 AM.  Always use your most recent med list.                   Brand Name Dispense Instructions for use    fish oil-omega-3 fatty acids 1000 MG capsule      Take 2 g by mouth daily       IBUPROFEN PO      Take 800 mg by mouth every 8 hours as needed for moderate pain       latanoprost 0.005 % ophthalmic solution    XALATAN     Place into both eyes At Bedtime       lidocaine-prilocaine cream    EMLA    30 g    Apply topically as needed for moderate pain       * LORazepam 0.5 MG tablet    ATIVAN    60 tablet    Take 1 tablet (0.5 mg) by mouth every 6 hours as needed for anxiety       * LORazepam 0.5 MG tablet    ATIVAN    30 tablet    Take 1 tablet (0.5 mg) by mouth every 4 hours as needed (Anxiety, Nausea/Vomiting or Sleep)       Multi-vitamin Tabs tablet      Take 1 tablet by mouth daily       ondansetron 8 MG tablet    ZOFRAN    10 tablet    Take 1 tablet (8 mg) by mouth every 8 hours as needed (Nausea/Vomiting)       prochlorperazine 10 MG tablet    COMPAZINE    30 tablet    Take 1 tablet (10 mg) by mouth every 6 hours as  needed (Nausea/Vomiting)       timolol 0.5 % ophthalmic solution    TIMOPTIC     Place into both eyes daily       zolpidem 5 MG tablet    AMBIEN    60 tablet    Take 1-2 tablets each evening as needed       * Notice:  This list has 2 medication(s) that are the same as other medications prescribed for you. Read the directions carefully, and ask your doctor or other care provider to review them with you.

## 2017-05-15 NOTE — PROGRESS NOTES
Infusion Nursing Note:  Reuben Padilla presents today for Cycle 1 Day 1 Oxaliplatin, Leucovorin, and Fluorouracil bolus and pump connect..    Patient seen by provider today: No    Intravenous Access:  Implanted Port.    Treatment Conditions:  Lab Results   Component Value Date    HGB 15.3 05/15/2017     Lab Results   Component Value Date    WBC 5.8 05/15/2017      Lab Results   Component Value Date    ANEU 3.8 05/15/2017     Lab Results   Component Value Date     05/15/2017      Lab Results   Component Value Date     05/15/2017                   Lab Results   Component Value Date    POTASSIUM 3.8 05/15/2017           No results found for: MAG         Lab Results   Component Value Date    CR 0.79 05/15/2017                   Lab Results   Component Value Date    NIDHI 8.8 05/15/2017                Lab Results   Component Value Date    BILITOTAL 0.6 05/15/2017           Lab Results   Component Value Date    ALBUMIN 3.4 05/15/2017                    Lab Results   Component Value Date    ALT 21 05/15/2017           Lab Results   Component Value Date    AST 22 05/15/2017     Results reviewed, labs MET treatment parameters, ok to proceed with treatment.    Note: Patient is new to the infusion room today and is receiving FOLFOX for the first time.  Patient oriented to infusion room, location of bathrooms and nutrition stations, and call light.  Verified that patient recieved written chemotherapy information previously.  Verbally reviewed chemotherapy teaching, side effects, take-home medications, and follow-up schedule with patient. Patient instructed to call triage/after hours with any questions or if he experiences a temperature >100.5, shaking chills, uncontrolled nausea/vomiting/diarrhea, dizziness, shortness of breath, bleeding not relieved with pressure, or with any other concerns.      Post Infusion Assessment:  Patient tolerated infusion without incident.  Blood return noted pre and post  infusion.    Fluorouracil continuous infuser connected at 0910.  Positive blood return from port at time of infuser hook up.  Fluorouracil to infuse over 46 hours at 5.2 cc/hour.   Pump connections double checked with RN that clamps are taped open. Capillary element taped to chest. Air filter hole noted to not be blocked. Pt aware to keep the infusor out of light d/t light sensitivity and avoiding extreme cold/hot temps to ensure pump's rate does not change.   Fluorouracil infuser will be disconnected on Wednesday, 5/17/17 at 0900 by [facility] infusion.  Writer talked with Danielle at \[facility]\"" and they will disconnect patient at his sister's house in [place].  \[facility]\"" aware of disconnect date and time.      Discharge Plan:   Prescription refills given for Ativan, Zofran, Compazine, and Emla Cream.  Discharge instructions reviewed with: Patient and Family.  Patient and/or family verbalized understanding of discharge instructions and all questions answered.  Copy of AVS reviewed with patient and/or family.  Patient will return 5/30/17 for next appointment.  Patient discharged in stable condition accompanied by: sister.  Departure Mode: Ambulatory.    MONTSE SHETTY RN

## 2017-05-15 NOTE — PROGRESS NOTES
ONCOLOGY SOCIAL WORK  D) Reuben is a 56-yr-old gentleman with newly dx'd metastatic esophageal cancer  I) introduction of social work  A) Reuben lives in [facility] in [place].  He is  and lives alone with his dog.  He has a 22-yr-old son.  His mother lives in [place] and his sister lives in the [facility].  He is presently staying with his sister and she works in [employer] so has been a big help to him navigating his benefits.  He is presently on intermittent short term disability, and also has long term disability thru his employer.  He works in the oil fields.  He feels his employer if supportive but worries if he cannot work how he would pay for COBRA.  Does not want to use his savings on insurance.  Gave him information on social security disability and how his case would be expedited.    Per chart notes he is taking Ambien for anxiety although he reports the worst is over, which was not knowing before the dx and waiting to start the treatment.    He feels what helps him most in coping is not to be talking about it, and said he doesn't think about it most of the time.  He does not have an advance care directive but was provided with a copy.  Explained that legally his son would be the medical decision-maker if he were not able to speak for himself, and he does not want his son to have to make those decisions.  Provided pt and his sister with a list of resources and some SSDI info    P) available as needed for support and resources    Uma Constantino, [facility]  [phone]

## 2017-05-16 ASSESSMENT — ENCOUNTER SYMPTOMS
MUSCLE CRAMPS: 1
HEARTBURN: 0
HOARSE VOICE: 0
LEG SWELLING: 1
EXERCISE INTOLERANCE: 0
SORE THROAT: 0
SINUS PAIN: 0
MYALGIAS: 1
NECK MASS: 0
HYPERTENSION: 0
HEADACHES: 0
RECTAL BLEEDING: 0
WEIGHT LOSS: 1
LEG PAIN: 0
SMELL DISTURBANCE: 0
BLOOD IN STOOL: 0
DECREASED APPETITE: 0
PALPITATIONS: 0
TASTE DISTURBANCE: 0
NIGHT SWEATS: 0
TREMORS: 0
DISTURBANCES IN COORDINATION: 0
POLYPHAGIA: 0
RECTAL PAIN: 0
MEMORY LOSS: 0
JOINT SWELLING: 0
SEIZURES: 0
TROUBLE SWALLOWING: 1
HALLUCINATIONS: 0
SPEECH CHANGE: 0
VOMITING: 1
WEAKNESS: 0
MUSCLE WEAKNESS: 0
POLYDIPSIA: 0
BACK PAIN: 0
FEVER: 0
TINGLING: 0
NUMBNESS: 0
WEIGHT GAIN: 0
CLAUDICATION: 0
HYPOTENSION: 0
TACHYCARDIA: 0
PARALYSIS: 0
NECK PAIN: 0
JAUNDICE: 0
BOWEL INCONTINENCE: 0
STIFFNESS: 1
SYNCOPE: 0
LIGHT-HEADEDNESS: 0
INCREASED ENERGY: 0
ALTERED TEMPERATURE REGULATION: 0
ORTHOPNEA: 0
FATIGUE: 1
ABDOMINAL PAIN: 0
CHILLS: 0
ARTHRALGIAS: 0
CONSTIPATION: 0
SINUS CONGESTION: 0
BLOATING: 0
DIARRHEA: 0
DIZZINESS: 0
LOSS OF CONSCIOUSNESS: 0
NAUSEA: 1
SLEEP DISTURBANCES DUE TO BREATHING: 0

## 2017-05-18 ENCOUNTER — TELEPHONE (OUTPATIENT)
Dept: ONCOLOGY | Facility: CLINIC | Age: 57
End: 2017-05-18

## 2017-05-18 NOTE — TELEPHONE ENCOUNTER
[facility] Telephone Triage Note    Assessment: Patient called in to triage reporting the following symptoms: nausea, and vomiting once this morning and twice yesterday.    Recommendations: Triaged per protocol. Encouraged taking nausea medication scheduled, small frequent meals, clear liquids or no spicy, fatty or salty foods, and taking antiemetic before eating. Encouraged to to call tomorrow if symptoms don't improve.    Follow-Up: Patient voiced understanding of advice and/or instructions given.

## 2017-05-22 ENCOUNTER — TELEPHONE (OUTPATIENT)
Dept: ONCOLOGY | Facility: CLINIC | Age: 57
End: 2017-05-22

## 2017-05-22 NOTE — TELEPHONE ENCOUNTER
"Form Request Documentation    Date Received in Clinic:  512/17   Name/Type of Form: [employer] Disability  Questions that need to be addressed:   Current Employment Status:  Works FULL TIME    Amount of Leave Requested: Will be attempting to continue to work on off treatment weeks.   Treatment is every other week    Other:  THIS FORM was for time period off 5/7/17 to 5/21/17  Date Completed: 5/22/2017  Copy Mailed to patient: No   Disposition of Form: Fax to Carter at [phone] on 5/22/17      Form Request Documentation    Date Received in Clinic:  5/22/17  Name/Type of Form: TARGA   Physical job requirement and work status form  Questions that need to be addressed:   Current Employment Status: currently working on a full time basis  (every other week, during off treatment weeks)   Amount of Leave Requested:  Every other week   Other: This form is a \"return to work with no restrictions\"  And may need to be completed after every treatment  Date Completed: 5/22/2017  Copy Mailed to patient: No  Disposition of Form: Email to [email or web address] on 5/22/17      "

## 2017-05-23 ENCOUNTER — HOME INFUSION (PRE-WILLOW HOME INFUSION) (OUTPATIENT)
Dept: PHARMACY | Facility: CLINIC | Age: 57
End: 2017-05-23

## 2017-05-23 ENCOUNTER — CARE COORDINATION (OUTPATIENT)
Dept: ONCOLOGY | Facility: CLINIC | Age: 57
End: 2017-05-23

## 2017-05-23 NOTE — PROGRESS NOTES
Therapy: IV chemo  Insurance: [employer] [place]   Ded: $450  Met: $450    Co-Insurance: 80% covered  Max Out of Pocket: $2450  Met: $2450    Patient is eligible for home disconnect     Please contact Intake with any questions, [phone] or In Basket pool, [facility] ([phone]).

## 2017-05-23 NOTE — PROGRESS NOTES
Received a call from Jennifer, pt's sister, with questions on nausea.  Pt has had nausea off and on for the first week after treatment. This has resolved as of today but he has been reluctant to eat at times in fear of causing nausea.  Reviewed how to take nausea meds with sister.  Pt will need reinforcement on measures to control nausea at next treatment.

## 2017-05-30 ENCOUNTER — CARE COORDINATION (OUTPATIENT)
Dept: ONCOLOGY | Facility: CLINIC | Age: 57
End: 2017-05-30

## 2017-05-30 ENCOUNTER — INFUSION THERAPY VISIT (OUTPATIENT)
Dept: ONCOLOGY | Facility: CLINIC | Age: 57
End: 2017-05-30
Attending: INTERNAL MEDICINE
Payer: COMMERCIAL

## 2017-05-30 ENCOUNTER — APPOINTMENT (OUTPATIENT)
Dept: LAB | Facility: CLINIC | Age: 57
End: 2017-05-30
Attending: INTERNAL MEDICINE
Payer: COMMERCIAL

## 2017-05-30 VITALS
RESPIRATION RATE: 14 BRPM | BODY MASS INDEX: 42.25 KG/M2 | HEART RATE: 78 BPM | TEMPERATURE: 98 F | SYSTOLIC BLOOD PRESSURE: 117 MMHG | DIASTOLIC BLOOD PRESSURE: 74 MMHG | OXYGEN SATURATION: 95 % | WEIGHT: 315 LBS

## 2017-05-30 DIAGNOSIS — C15.5 CANCER OF DISTAL THIRD OF ESOPHAGUS (H): ICD-10-CM

## 2017-05-30 DIAGNOSIS — C15.5 CANCER OF DISTAL THIRD OF ESOPHAGUS (H): Primary | ICD-10-CM

## 2017-05-30 LAB
ALBUMIN SERPL-MCNC: 3.2 G/DL (ref 3.4–5)
ALP SERPL-CCNC: 86 U/L (ref 40–150)
ALT SERPL W P-5'-P-CCNC: 20 U/L (ref 0–70)
ANION GAP SERPL CALCULATED.3IONS-SCNC: 8 MMOL/L (ref 3–14)
AST SERPL W P-5'-P-CCNC: 19 U/L (ref 0–45)
BASOPHILS # BLD AUTO: 0 10E9/L (ref 0–0.2)
BASOPHILS NFR BLD AUTO: 0.3 %
BILIRUB SERPL-MCNC: 0.5 MG/DL (ref 0.2–1.3)
BUN SERPL-MCNC: 13 MG/DL (ref 7–30)
CALCIUM SERPL-MCNC: 8.4 MG/DL (ref 8.5–10.1)
CHLORIDE SERPL-SCNC: 111 MMOL/L (ref 94–109)
CO2 SERPL-SCNC: 24 MMOL/L (ref 20–32)
CREAT SERPL-MCNC: 0.83 MG/DL (ref 0.66–1.25)
DIFFERENTIAL METHOD BLD: ABNORMAL
EOSINOPHIL # BLD AUTO: 0.1 10E9/L (ref 0–0.7)
EOSINOPHIL NFR BLD AUTO: 2 %
ERYTHROCYTE [DISTWIDTH] IN BLOOD BY AUTOMATED COUNT: 13.4 % (ref 10–15)
GFR SERPL CREATININE-BSD FRML MDRD: ABNORMAL ML/MIN/1.7M2
GLUCOSE SERPL-MCNC: 114 MG/DL (ref 70–99)
HCT VFR BLD AUTO: 38.4 % (ref 40–53)
HGB BLD-MCNC: 13.1 G/DL (ref 13.3–17.7)
IMM GRANULOCYTES # BLD: 0 10E9/L (ref 0–0.4)
IMM GRANULOCYTES NFR BLD: 0.3 %
LYMPHOCYTES # BLD AUTO: 0.9 10E9/L (ref 0.8–5.3)
LYMPHOCYTES NFR BLD AUTO: 30.7 %
MCH RBC QN AUTO: 29.1 PG (ref 26.5–33)
MCHC RBC AUTO-ENTMCNC: 34.1 G/DL (ref 31.5–36.5)
MCV RBC AUTO: 85 FL (ref 78–100)
MONOCYTES # BLD AUTO: 0.3 10E9/L (ref 0–1.3)
MONOCYTES NFR BLD AUTO: 9.2 %
NEUTROPHILS # BLD AUTO: 1.7 10E9/L (ref 1.6–8.3)
NEUTROPHILS NFR BLD AUTO: 57.5 %
NRBC # BLD AUTO: 0 10*3/UL
NRBC BLD AUTO-RTO: 0 /100
PLATELET # BLD AUTO: 137 10E9/L (ref 150–450)
POTASSIUM SERPL-SCNC: 3.9 MMOL/L (ref 3.4–5.3)
PROT SERPL-MCNC: 6.7 G/DL (ref 6.8–8.8)
RBC # BLD AUTO: 4.5 10E12/L (ref 4.4–5.9)
SODIUM SERPL-SCNC: 144 MMOL/L (ref 133–144)
WBC # BLD AUTO: 2.9 10E9/L (ref 4–11)

## 2017-05-30 PROCEDURE — 99214 OFFICE O/P EST MOD 30 MIN: CPT | Mod: ZP | Performed by: PHYSICIAN ASSISTANT

## 2017-05-30 PROCEDURE — 96416 CHEMO PROLONG INFUSE W/PUMP: CPT

## 2017-05-30 PROCEDURE — 96368 THER/DIAG CONCURRENT INF: CPT

## 2017-05-30 PROCEDURE — 25000128 H RX IP 250 OP 636: Mod: ZF | Performed by: PHYSICIAN ASSISTANT

## 2017-05-30 PROCEDURE — 80053 COMPREHEN METABOLIC PANEL: CPT | Performed by: INTERNAL MEDICINE

## 2017-05-30 PROCEDURE — 96367 TX/PROPH/DG ADDL SEQ IV INF: CPT

## 2017-05-30 PROCEDURE — 96375 TX/PRO/DX INJ NEW DRUG ADDON: CPT

## 2017-05-30 PROCEDURE — 96415 CHEMO IV INFUSION ADDL HR: CPT

## 2017-05-30 PROCEDURE — 85025 COMPLETE CBC W/AUTO DIFF WBC: CPT | Performed by: INTERNAL MEDICINE

## 2017-05-30 PROCEDURE — 96411 CHEMO IV PUSH ADDL DRUG: CPT

## 2017-05-30 PROCEDURE — 96413 CHEMO IV INFUSION 1 HR: CPT

## 2017-05-30 PROCEDURE — 25000125 ZZHC RX 250: Mod: ZF | Performed by: PHYSICIAN ASSISTANT

## 2017-05-30 RX ORDER — ALBUTEROL SULFATE 0.83 MG/ML
2.5 SOLUTION RESPIRATORY (INHALATION)
Status: CANCELLED | OUTPATIENT
Start: 2017-05-30

## 2017-05-30 RX ORDER — METHYLPREDNISOLONE SODIUM SUCCINATE 125 MG/2ML
125 INJECTION, POWDER, LYOPHILIZED, FOR SOLUTION INTRAMUSCULAR; INTRAVENOUS
Status: CANCELLED
Start: 2017-05-30

## 2017-05-30 RX ORDER — EPINEPHRINE 0.3 MG/.3ML
0.3 INJECTION SUBCUTANEOUS EVERY 5 MIN PRN
Status: CANCELLED | OUTPATIENT
Start: 2017-05-30

## 2017-05-30 RX ORDER — MEPERIDINE HYDROCHLORIDE 25 MG/ML
25 INJECTION INTRAMUSCULAR; INTRAVENOUS; SUBCUTANEOUS EVERY 30 MIN PRN
Status: CANCELLED | OUTPATIENT
Start: 2017-05-30

## 2017-05-30 RX ORDER — HEPARIN SODIUM (PORCINE) LOCK FLUSH IV SOLN 100 UNIT/ML 100 UNIT/ML
500 SOLUTION INTRAVENOUS ONCE
Status: COMPLETED | OUTPATIENT
Start: 2017-05-30 | End: 2017-05-30

## 2017-05-30 RX ORDER — DIPHENHYDRAMINE HYDROCHLORIDE 50 MG/ML
50 INJECTION INTRAMUSCULAR; INTRAVENOUS
Status: CANCELLED
Start: 2017-05-30

## 2017-05-30 RX ORDER — PALONOSETRON 0.05 MG/ML
0.25 INJECTION, SOLUTION INTRAVENOUS ONCE
Status: COMPLETED | OUTPATIENT
Start: 2017-05-30 | End: 2017-05-30

## 2017-05-30 RX ORDER — ALBUTEROL SULFATE 90 UG/1
1-2 AEROSOL, METERED RESPIRATORY (INHALATION)
Status: CANCELLED
Start: 2017-05-30

## 2017-05-30 RX ORDER — LORAZEPAM 2 MG/ML
0.5 INJECTION INTRAMUSCULAR EVERY 4 HOURS PRN
Status: CANCELLED
Start: 2017-05-30

## 2017-05-30 RX ORDER — FLUOROURACIL 50 MG/ML
400 INJECTION, SOLUTION INTRAVENOUS ONCE
Status: COMPLETED | OUTPATIENT
Start: 2017-05-30 | End: 2017-05-30

## 2017-05-30 RX ORDER — ONDANSETRON 8 MG/1
8 TABLET, FILM COATED ORAL EVERY 8 HOURS PRN
Qty: 30 TABLET | Refills: 2 | Status: SHIPPED | OUTPATIENT
Start: 2017-05-30 | End: 2019-01-16

## 2017-05-30 RX ORDER — EPINEPHRINE 0.3 MG/.3ML
INJECTION SUBCUTANEOUS
Status: DISCONTINUED
Start: 2017-05-30 | End: 2017-05-30 | Stop reason: WASHOUT

## 2017-05-30 RX ORDER — SODIUM CHLORIDE 9 MG/ML
1000 INJECTION, SOLUTION INTRAVENOUS CONTINUOUS PRN
Status: CANCELLED
Start: 2017-05-30

## 2017-05-30 RX ADMIN — PALONOSETRON HYDROCHLORIDE 0.25 MG: 0.25 INJECTION INTRAVENOUS at 08:59

## 2017-05-30 RX ADMIN — FLUOROURACIL 1220 MG: 50 INJECTION, SOLUTION INTRAVENOUS at 11:52

## 2017-05-30 RX ADMIN — SODIUM CHLORIDE, PRESERVATIVE FREE 500 UNITS: 5 INJECTION INTRAVENOUS at 06:48

## 2017-05-30 RX ADMIN — OXALIPLATIN 250 MG: 5 INJECTION, SOLUTION, CONCENTRATE INTRAVENOUS at 09:46

## 2017-05-30 RX ADMIN — DEXTROSE MONOHYDRATE 250 ML: 50 INJECTION, SOLUTION INTRAVENOUS at 08:18

## 2017-05-30 RX ADMIN — DEXAMETHASONE SODIUM PHOSPHATE: 10 INJECTION, SOLUTION INTRAMUSCULAR; INTRAVENOUS at 08:18

## 2017-05-30 RX ADMIN — SODIUM CHLORIDE 150 MG: 9 INJECTION, SOLUTION INTRAVENOUS at 09:06

## 2017-05-30 RX ADMIN — LEUCOVORIN CALCIUM 1050 MG: 500 INJECTION, POWDER, LYOPHILIZED, FOR SOLUTION INTRAMUSCULAR; INTRAVENOUS at 09:45

## 2017-05-30 NOTE — PATIENT INSTRUCTIONS
Contact Numbers  [facility] Nurse Triage: [phone]  After Hours Nurse Line:  [phone]    Please call the [facility] Nurse Triage line or after hours number if you experience a temperature greater than or equal to 100.5, shaking chills, have uncontrolled nausea, vomiting and/or diarrhea, dizziness, shortness of breath, chest pain, bleeding, unexplained bruising, or if you have any other new/concerning symptoms, questions or concerns.     If you are having any concerning symptoms or wish to speak to a provider before your next infusion visit, please call your care coordinator or triage to notify them so we can adequately serve you.     If you need a refill on a narcotic prescription or other medication, please call triage before your infusion appointment.

## 2017-05-30 NOTE — PROGRESS NOTES
Met with Levi during his infusion to review anti-nausea medication.  Provided him with the detailed anti-nausea plan below, along with handouts on constipation, diarrhea and post chemo instructions.   Answered all questions to his stated satisfaction.  Encouraged him to call with additional questions or concerns.    Chemotherapy Anti-Nausea Schedule       DAy 1 DAy 2 Day 3 Day 4 Day 5 Notes     Medication: Ondansetron (Zofran)    Dose: 8 mg    How often: Take one tab every 8 hours as needed for nausea or vomiting       DO NOT TAKE     DO NOT TAKE     DO NOT TAKE     As needed     As needed   May cause headaches.      Medication: Prochlorperazine (Compazine)    Dose: 10mg    How often: Take one tab every 6 hours as needed for nausea or vomiting.       7 pm     7 am  1 pm  7 pm       7 am  1 pm  7 pm     7 am  1 pm  7 pm     7 am  1 pm  7 pm   Can make you drowsy.     Medication:  Lorazepam (Ativan)    Dose: 0.5mg     How often: Take one tab every 4 hours as needed for anxiety, nausea/vomiting or sleep     Bedtime  and  as needed     As needed     As needed     As needed     As needed   Can make you drowsy, do not drive when taking    Take in between doses of Compazine/Zofran if needed for nausea.    Ok to take to help sleep at night   Medication:  Senna    Dose: 1 tab    How often: Take one tab once a day      Daily   Daily   Daily   Daily   Daily      If you are experiencing nausea/vomiting not relieved by medications please call TRIAGE at [phone]

## 2017-05-30 NOTE — MR AVS SNAPSHOT
After Visit Summary   5/30/2017    Reuben Padilla    MRN: [id number]           Patient Information     Date Of Birth          1960        Visit Information        Provider Department      5/30/2017 7:00 AM Loraine Sutherland PA-C [facility]        Today's Diagnoses     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)           Follow-ups after your visit        Your next 10 appointments already scheduled     Jun 27, 2017  8:30 AM CDT   Masonic Lab Draw with  MASONIC LAB DRAW   [facility] ([facility]    [address] [place] [address]   [phone]            Jun 27, 2017  9:00 AM CDT   (Arrive by 8:45 AM)   Return Visit with Paula Rodriguez PA-C   [facility] ([facility])    [address] [place] [address]   [phone]            Jun 27, 2017  9:30 AM CDT   Infusion 240 with  ONCOLOGY INFUSION, UC 27 ATC   [facility] ([facility])    [address] [place] [address]   [phone]            Jul 10, 2017  9:00 AM CDT   Masonic Lab Draw with  MASONIC LAB DRAW   [facility] Masonic Lab Draw ([facility])    [address] [place] [address]   [phone]            Jul 10, 2017  9:40 AM CDT   (Arrive by 9:25 AM)   CT CHEST/ABDOMEN/PELVIS W CONTRAST with UCCT1   [facility] [place] [facility] ([facility])    [address] [place] [address]  [address]st [facility] [address]   [phone]           Please bring any scans or X-rays taken at other hospitals, if similar tests were done. Also bring a list of your medicines, including vitamins, minerals and over-the-counter drugs. It is safest to leave personal items at home.  Be sure to tell your doctor:   If you have any allergies.   If there s any chance you are  pregnant.   If you are breastfeeding.   If you have any special needs.  You may have contrast for this exam. To prepare:   Do not eat or drink for 2 hours before your exam. If you need to take medicine, you may take it with small sips of water. (We may ask you to take liquid medicine as well.)   The day before your exam, drink extra fluids at least six 8-ounce glasses (unless your doctor tells you to restrict your fluids).  Patients over 70 or patients with diabetes or kidney problems:   If you haven t had a blood test (creatinine test) within the last 30 days, go to your clinic or Diagnostic Imaging Department for this test.  If you have diabetes:   If your kidney function is normal, continue taking your metformin (Avandamet, Glucophage, Glucovance, Metaglip) on the day of your exam.   If your kidney function is abnormal, wait 48 hours before restarting this medicine.  You will have oral contrast for this exam:   You will drink the contrast at home. Get this from your clinic or Diagnostic Imaging Department. Please follow the directions given.  Please wear loose clothing, such as a sweat suit or jogging clothes. Avoid snaps, zippers and other metal. We may ask you to undress and put on a hospital gown.  If you have any questions, please call the Imaging Department where you will have your exam.            Jul 10, 2017 12:30 PM CDT   (Arrive by 12:15 PM)   Return Visit with Kadi Dee MD   [facility] ([facility])    [address] [place] [address]  [address] [facility] [address]   [phone]            Jul 10, 2017  1:00 PM CDT   Infusion 240 with  ONCOLOGY INFUSION,  10 ATC   [facility] ([facility])    [address] [facility]  [address] [facility] [address]   [phone]              Future tests that were ordered for you today     Open Future Orders        Priority Expected Expires Ordered    CT  Chest/Abdomen/Pelvis w Contrast Routine  6/15/2018 6/15/2017            Who to contact     If you have questions or need follow up information about today's clinic visit or your schedule please contact [facility] directly at [phone].  Normal or non-critical lab and imaging results will be communicated to you by MyChart, letter or phone within 4 business days after the clinic has received the results. If you do not hear from us within 7 days, please contact the clinic through yoonewhart or phone. If you have a critical or abnormal lab result, we will notify you by phone as soon as possible.  Submit refill requests through [employer] or call your pharmacy and they will forward the refill request to us. Please allow 3 business days for your refill to be completed.          Additional Information About Your Visit        yoonewhart Information     [employer] gives you secure access to your electronic health record. If you see a primary care provider, you can also send messages to your care team and make appointments. If you have questions, please call your primary care clinic.  If you do not have a primary care provider, please call [phone] and they will assist you.        Care EveryWhere ID     This is your Care EveryWhere ID. This could be used by other organizations to access your [facility] medical records  [id number]        Your Vitals Were     Pulse Temperature Respirations Pulse Oximetry BMI (Body Mass Index)       78 98  F (36.7  C) (Oral) 14 95% 42.25 kg/m2        Blood Pressure from Last 3 Encounters:   06/12/17 124/85   05/30/17 117/74   05/15/17 118/82    Weight from Last 3 Encounters:   06/12/17 (!) 163.9 kg (361 lb 6.4 oz)   05/30/17 (!) 165.8 kg (365 lb 9.6 oz)   05/15/17 (!) 165.5 kg (364 lb 14.4 oz)              Today, you had the following     No orders found for display         Where to get your medicines      These medications were sent to [facility] -  [place], [place] - [address] [facility] [address]  [address], [facility] [address]    Hours:  TRANSPLANT PHONE NUMBER [phone] Phone:  [phone]     ondansetron 8 MG tablet          Primary Care Provider    Unknown Primary MD Sydney       No address on file        Thank you!     Thank you for choosing [facility]  for your care. Our goal is always to provide you with excellent care. Hearing back from our patients is one way we can continue to improve our services. Please take a few minutes to complete the written survey that you may receive in the mail after your visit with us. Thank you!             Your Updated Medication List - Protect others around you: Learn how to safely use, store and throw away your medicines at www.disposemymeds.org.          This list is accurate as of: 5/30/17 11:59 PM.  Always use your most recent med list.                   Brand Name Dispense Instructions for use    fish oil-omega-3 fatty acids 1000 MG capsule      Take 2 g by mouth daily       IBUPROFEN PO      Take 800 mg by mouth every 8 hours as needed for moderate pain       latanoprost 0.005 % ophthalmic solution    XALATAN     Place into both eyes At Bedtime       lidocaine-prilocaine cream    EMLA    30 g    Apply topically as needed for moderate pain       * LORazepam 0.5 MG tablet    ATIVAN    60 tablet    Take 1 tablet (0.5 mg) by mouth every 6 hours as needed for anxiety       * LORazepam 0.5 MG tablet    ATIVAN    30 tablet    Take 1 tablet (0.5 mg) by mouth every 4 hours as needed (Anxiety, Nausea/Vomiting or Sleep)       Multi-vitamin Tabs tablet      Take 1 tablet by mouth daily       ondansetron 8 MG tablet    ZOFRAN    30 tablet    Take 1 tablet (8 mg) by mouth every 8 hours as needed (Nausea/Vomiting)       prochlorperazine 10 MG tablet    COMPAZINE    30 tablet    Take 1 tablet (10 mg) by mouth every 6 hours as needed (Nausea/Vomiting)       timolol 0.5 % ophthalmic solution     TIMOPTIC     Place into both eyes daily       zolpidem 5 MG tablet    AMBIEN    60 tablet    Take 1-2 tablets each evening as needed       * Notice:  This list has 2 medication(s) that are the same as other medications prescribed for you. Read the directions carefully, and ask your doctor or other care provider to review them with you.

## 2017-05-30 NOTE — PROGRESS NOTES
[facility]  FOLLOW-UP VISIT NOTE  Date of visit: 5-30-17        REASON FOR VISIT: metastatic esophageal CA, here for C2 of FOLFOX    HPI: Reuben reports that initially he noticed symptoms of dysphagia, mainly to solid food, starting in February of 2017.  Symptoms got progressively worse.  Currently, he is unable to eat solid food, and he needs to take a lot of fluid to push food down.  His dysphagia symptoms prompted subsequent evaluation with upper endoscopy and colonoscopy that was done in [place]. The colonoscopy revealed two tubular adenomas in the colon.  The upper endoscopy revealed an ulcerated mass 1-2 cm long, approximately 40 cm from the lips.  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       The patient was seen by Dr. Clemencia Carreon initially who ordered an ENT evaluation and supraclavicular lymph node FNA to rule out head and neck etiology of the cancer, since outside pathology was reported as SCC. The pathology slides were requested from [facility] for review by the [facility] Pathology Department. Our diagnosis is invasive  poorly differentiated adenocarcinoma.  The FNA of the supraclavicular LN was done that confirmed poorly differentiated adenocarcinoma of GI origin.  The tumor was P63 negative and CK7 negative.  Positive for cytokeratin 20.  The patient also had a PET/CT scan done that revealed widespread lavon metastasis and multiple liver mets that were consistent with metastatic disease. HER2 is negative.     Reuben started FOLFOX on 5/15/17 and presents today for C2.     INTERVAL HISTORY: Reuben is here with his sister today for C2.   The first cycle had daily nausea for about 6 days.  It wasn't persistent, but at least every day he felt nausea and had emesis.  He denied mucositis, but did have a mild sore throat.  He had some dyspepsia that was better with eating, but  TUMS didn't help.  His weight went down about 6-7 lbs mostly due to not eating much the first week, but fluids.  He does feel his food stick and dysphagia improved though and was able to eat more solid foods the second week.      No f/s/c.  Mild cold sensitivity for a few days.  No chest pain/cough/dyspnea.  BM went back and forth with constipation vs loose stools.  No  issues. No change to his chronic neuropathy.     EXAM:  /74  Pulse 78  Temp 98  F (36.7  C) (Oral)  Resp 14  Wt (!) 165.8 kg (365 lb 9.6 oz)  SpO2 95%  BMI 42.25 kg/m2  Wt Readings from Last 4 Encounters:   05/30/17 (!) 165.8 kg (365 lb 9.6 oz)   05/15/17 (!) 165.5 kg (364 lb 14.4 oz)   05/09/17 (!) 168.7 kg (372 lb)   05/08/17 (!) 168.7 kg (372 lb)     Vital signs were reviewed.   Patient alert and oriented x3.   PERRLA. EOMI. No scleral icterus noted. OP without thrush/sores.  Neck exam: No palpable cervical, supraclavicular or axillary nodes bilaterally.   Heart: RRR no murmurs noted.   Lungs: clear to auscultation bilaterally.  No crackles or wheezing.   Abd: positive bowel sounds in all four quadrants.  No tenderness to palpation.  No hepatomegaly.   Extremities: No lower extremity edema.   Neuro: grossly intact.   Mood and affect is stable.       LABS:      5/15/2017 07:14 5/30/2017 07:00   WBC 5.8 2.9 (L)   Hemoglobin 15.3 13.1 (L)   Hematocrit 45.1 38.4 (L)   Platelet Count 213 137 (L)   RBC Count 5.26 4.50   MCV 86 85   Absolute Neutrophil 3.8 1.7      5/30/2017 07:00   Sodium 144   Potassium 3.9   Chloride 111 (H)   Carbon Dioxide 24   Urea Nitrogen 13   Creatinine 0.83   GFR Estimate >90...   GFR Estimate If Black >90...   Calcium 8.4 (L)   Anion Gap 8   Albumin 3.2 (L)   Protein Total 6.7 (L)   Bilirubin Total 0.5   Alkaline Phosphatase 86   ALT 20   AST 19   Glucose 114 (H)     ASSESSMENT/PLAN: 56 year old male with metastatic esophageal cancer    ONC- s/p 1 cycle of FOLFOX- tolerated with nausea/vomiting with associated  weight loss. Clinically symptoms of food stick/dysphagia seem improved to Reuben which is encouraging.  Discussed better symptom mgmt below.   - labs OK for C2  - CT CAP and DR Dee after two months    GI- pre-chemo symptoms of food stick/dysphagia are improved.  Had nausea/vomiting Days 1-6 and some dyspepsia  -Add emend/aloxi  -Zofran days 3-6  -ativan qhs  -Tums, small meals throughout day for dyspepsia     ENT: sore throat, no oral sores  -salt/soda swishes    FEN: weight down 6-7 lbs.  Discussed high fat/calorie dense foods and snacks during first week.  64 oz hydration daily     Cards: NSR today, OK for ASA and omega-3    Psych: discussed overall prognosis and life expectancy with metastatic disease.  Reuben was a little disheartened to hear that his life expectancy wasn't 5-10 years.  He was grateful for the information as he would like to consider retiring from his job and [place] the cities as he currently is commuting from [place].     Nicole Galicia PA-C

## 2017-05-30 NOTE — NURSING NOTE
Chief Complaint   Patient presents with     Port Draw     Labs Drawn      Port accessed with flat needle. Labs drawn. Flushed with heparin and NS.    Lauren Schoen, RN

## 2017-05-30 NOTE — MR AVS SNAPSHOT
After Visit Summary   5/30/2017    Reuben Padilla    MRN: [id number]           Patient Information     Date Of Birth          1960        Visit Information        Provider Department      5/30/2017 7:00 AM UC 10 ATC;  ONCOLOGY INFUSION [facility]        Today's Diagnoses     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)    -  1      Care Instructions    Contact Numbers  [facility] Nurse Triage: [phone]  After Hours Nurse Line:  [phone]    Please call the [facility] Nurse Triage line or after hours number if you experience a temperature greater than or equal to 100.5, shaking chills, have uncontrolled nausea, vomiting and/or diarrhea, dizziness, shortness of breath, chest pain, bleeding, unexplained bruising, or if you have any other new/concerning symptoms, questions or concerns.     If you are having any concerning symptoms or wish to speak to a provider before your next infusion visit, please call your care coordinator or triage to notify them so we can adequately serve you.     If you need a refill on a narcotic prescription or other medication, please call triage before your infusion appointment.             Follow-ups after your visit        Your next 10 appointments already scheduled     Jun 12, 2017  7:30 AM T   [facility]   [facility] Lab Draw ([facility])    [address] [place] [address] [phone]            Jun 12, 2017  8:00 AM CDT   Infusion 240 with  ONCOLOGY INFUSION, UC 15 ATC   [facility] ([facility])    [address] [place] [address] [phone]              Who to contact     If you have questions or need follow up information about today's clinic visit or your schedule please contact [facility] directly at [phone].  Normal or non-critical lab and  imaging results will be communicated to you by MyChart, letter or phone within 4 business days after the clinic has received the results. If you do not hear from us within 7 days, please contact the clinic through [employer] or phone. If you have a critical or abnormal lab result, we will notify you by phone as soon as possible.  Submit refill requests through Say2me or call your pharmacy and they will forward the refill request to us. Please allow 3 business days for your refill to be completed.          Additional Information About Your Visit        Say2me Information     Say2me gives you secure access to your electronic health record. If you see a primary care provider, you can also send messages to your care team and make appointments. If you have questions, please call your primary care clinic.  If you do not have a primary care provider, please call [phone] and they will assist you.        Care EveryWhere ID     This is your Care EveryWhere ID. This could be used by other organizations to access your [facility] medical records  [id number]         Blood Pressure from Last 3 Encounters:   05/30/17 117/74   05/15/17 118/82   05/09/17 111/58    Weight from Last 3 Encounters:   05/30/17 (!) 165.8 kg (365 lb 9.6 oz)   05/15/17 (!) 165.5 kg (364 lb 14.4 oz)   05/09/17 (!) 168.7 kg (372 lb)              We Performed the Following     CBC with platelets differential     Comprehensive metabolic panel          Where to get your medicines      These medications were sent to [facility] [id number] [place] [address] [address] [address]    Hours:  TRANSPLANT PHONE NUMBER [phone] Phone:  [phone]     ondansetron 8 MG tablet          Primary Care Provider    Unknown Primary MD Sydney       No address on file        Thank you!     Thank you for choosing [facility]  for your care. Our goal is always to provide you with  excellent care. Hearing back from our patients is one way we can continue to improve our services. Please take a few minutes to complete the written survey that you may receive in the mail after your visit with us. Thank you!             Your Updated Medication List - Protect others around you: Learn how to safely use, store and throw away your medicines at www.disposemymeds.org.          This list is accurate as of: 5/30/17 12:27 PM.  Always use your most recent med list.                   Brand Name Dispense Instructions for use    fish oil-omega-3 fatty acids 1000 MG capsule      Take 2 g by mouth daily       IBUPROFEN PO      Take 800 mg by mouth every 8 hours as needed for moderate pain       latanoprost 0.005 % ophthalmic solution    XALATAN     Place into both eyes At Bedtime       lidocaine-prilocaine cream    EMLA    30 g    Apply topically as needed for moderate pain       * LORazepam 0.5 MG tablet    ATIVAN    60 tablet    Take 1 tablet (0.5 mg) by mouth every 6 hours as needed for anxiety       * LORazepam 0.5 MG tablet    ATIVAN    30 tablet    Take 1 tablet (0.5 mg) by mouth every 4 hours as needed (Anxiety, Nausea/Vomiting or Sleep)       Multi-vitamin Tabs tablet      Take 1 tablet by mouth daily       ondansetron 8 MG tablet    ZOFRAN    30 tablet    Take 1 tablet (8 mg) by mouth every 8 hours as needed (Nausea/Vomiting)       prochlorperazine 10 MG tablet    COMPAZINE    30 tablet    Take 1 tablet (10 mg) by mouth every 6 hours as needed (Nausea/Vomiting)       timolol 0.5 % ophthalmic solution    TIMOPTIC     Place into both eyes daily       zolpidem 5 MG tablet    AMBIEN    60 tablet    Take 1-2 tablets each evening as needed       * Notice:  This list has 2 medication(s) that are the same as other medications prescribed for you. Read the directions carefully, and ask your doctor or other care provider to review them with you.

## 2017-05-30 NOTE — PROGRESS NOTES
Infusion Nursing Note:  Reuben Padilla presents today for Cycle 2 Day 1 Oxaliplatin, Leucovorin, and Fluorouracil bolus and pump connect.    Patient seen by provider today: Yes: Loraine Sutherland PA-C.    Intravenous Access:  Implanted Port.    Treatment Conditions:  Lab Results   Component Value Date    HGB 13.1 05/30/2017     Lab Results   Component Value Date    WBC 2.9 05/30/2017      Lab Results   Component Value Date    ANEU 1.7 05/30/2017     Lab Results   Component Value Date     05/30/2017      Lab Results   Component Value Date     05/30/2017                   Lab Results   Component Value Date    POTASSIUM 3.9 05/30/2017           No results found for: MAG         Lab Results   Component Value Date    CR 0.83 05/30/2017                   Lab Results   Component Value Date    NIDHI 8.4 05/30/2017                Lab Results   Component Value Date    BILITOTAL 0.5 05/30/2017           Lab Results   Component Value Date    ALBUMIN 3.2 05/30/2017                    Lab Results   Component Value Date    ALT 20 05/30/2017           Lab Results   Component Value Date    AST 19 05/30/2017     Results reviewed, labs MET treatment parameters, ok to proceed with treatment.    Post Infusion Assessment:  Patient tolerated infusion without incident.  Blood return noted pre and post infusion.    Fluorouracil continuous infuser connected at 1152.  Positive blood return from port at time of infuser hook up.  Fluorouracil to infuse over 46 hours at 5.2 cc/hour.   Pump connections double checked with RN that clamps are taped open. Capillary element taped to chest. Air filter hole noted to not be blocked. Pt aware to keep the infusor out of light d/t light sensitivity and avoiding extreme cold/hot temps to ensure pump's rate does not change.   Fluorouracil infuser will be disconnected on Thursday, 6/1/17 at 0930 by [facility].  Writer left a message on Piedad BONILLA's voicemail at [facility] with disconnect date and  time.  Writer also notified Piedad BONILLA that patient will be staying at the [facility] with this cycle of chemotherapy and will need to be disconnected there.    Discharge Plan:   Prescription refills given for Zofran.  Discharge instructions reviewed with: Patient and Family.  Patient and/or family verbalized understanding of discharge instructions and all questions answered.  Copy of AVS reviewed with patient and/or family.  Patient will return 6/12/17 for next appointment.  Patient discharged in stable condition accompanied by: sister.  Departure Mode: Ambulatory.    MONTSE SHETTY RN

## 2017-05-31 ENCOUNTER — HOME INFUSION (PRE-WILLOW HOME INFUSION) (OUTPATIENT)
Dept: PHARMACY | Facility: CLINIC | Age: 57
End: 2017-05-31

## 2017-06-01 ENCOUNTER — HOME INFUSION (PRE-WILLOW HOME INFUSION) (OUTPATIENT)
Dept: PHARMACY | Facility: CLINIC | Age: 57
End: 2017-06-01

## 2017-06-06 ENCOUNTER — TELEPHONE (OUTPATIENT)
Dept: ONCOLOGY | Facility: CLINIC | Age: 57
End: 2017-06-06

## 2017-06-06 RX ORDER — FLUOROURACIL 50 MG/ML
400 INJECTION, SOLUTION INTRAVENOUS ONCE
Status: CANCELLED | OUTPATIENT
Start: 2017-06-12

## 2017-06-06 RX ORDER — SODIUM CHLORIDE 9 MG/ML
1000 INJECTION, SOLUTION INTRAVENOUS CONTINUOUS PRN
Status: CANCELLED
Start: 2017-06-12

## 2017-06-06 RX ORDER — ALBUTEROL SULFATE 90 UG/1
1-2 AEROSOL, METERED RESPIRATORY (INHALATION)
Status: CANCELLED
Start: 2017-06-12

## 2017-06-06 RX ORDER — LORAZEPAM 2 MG/ML
0.5 INJECTION INTRAMUSCULAR EVERY 4 HOURS PRN
Status: CANCELLED
Start: 2017-06-12

## 2017-06-06 RX ORDER — MEPERIDINE HYDROCHLORIDE 25 MG/ML
25 INJECTION INTRAMUSCULAR; INTRAVENOUS; SUBCUTANEOUS EVERY 30 MIN PRN
Status: CANCELLED | OUTPATIENT
Start: 2017-06-12

## 2017-06-06 RX ORDER — ALBUTEROL SULFATE 0.83 MG/ML
2.5 SOLUTION RESPIRATORY (INHALATION)
Status: CANCELLED | OUTPATIENT
Start: 2017-06-12

## 2017-06-06 RX ORDER — PALONOSETRON 0.05 MG/ML
0.25 INJECTION, SOLUTION INTRAVENOUS ONCE
Status: CANCELLED
Start: 2017-06-12 | End: 2017-06-13

## 2017-06-06 RX ORDER — DIPHENHYDRAMINE HYDROCHLORIDE 50 MG/ML
50 INJECTION INTRAMUSCULAR; INTRAVENOUS
Status: CANCELLED
Start: 2017-06-12

## 2017-06-06 RX ORDER — METHYLPREDNISOLONE SODIUM SUCCINATE 125 MG/2ML
125 INJECTION, POWDER, LYOPHILIZED, FOR SOLUTION INTRAMUSCULAR; INTRAVENOUS
Status: CANCELLED
Start: 2017-06-12

## 2017-06-06 RX ORDER — EPINEPHRINE 0.3 MG/.3ML
0.3 INJECTION SUBCUTANEOUS EVERY 5 MIN PRN
Status: CANCELLED | OUTPATIENT
Start: 2017-06-12

## 2017-06-06 NOTE — TELEPHONE ENCOUNTER
SIMON PIMENTEL  Referral sent for [facility] hospital 6/11-14  [facility], [facility]  [phone]    Per Dr. Dee, pt ok to stay alone. Caregiver exemption faxed  [facility]ma Constant[facility], [facility]  [phone]    P/c from pt, requesting to stay at the [facility] 6/24-6/30.  Referral faxed over and emailed Sadia De Jesus to say uncertain of pt's inability to stay w/o a caregiver this far ahead.  Pt also had lots of concerns and questions about his disability options and insurance coverage.  He plans to move back to [place] by October.  Discussed this at length, but also encouraged him to contact Cancer Legal Care and the Disability Linkage line, and provided by phone numbers  Available as needed for support and resources    Uma Constantino, [facility]  [phone]

## 2017-06-12 ENCOUNTER — HOME INFUSION (PRE-WILLOW HOME INFUSION) (OUTPATIENT)
Dept: PHARMACY | Facility: CLINIC | Age: 57
End: 2017-06-12

## 2017-06-12 ENCOUNTER — TELEPHONE (OUTPATIENT)
Dept: ONCOLOGY | Facility: CLINIC | Age: 57
End: 2017-06-12

## 2017-06-12 ENCOUNTER — APPOINTMENT (OUTPATIENT)
Dept: LAB | Facility: CLINIC | Age: 57
End: 2017-06-12
Attending: PHYSICIAN ASSISTANT
Payer: COMMERCIAL

## 2017-06-12 ENCOUNTER — INFUSION THERAPY VISIT (OUTPATIENT)
Dept: ONCOLOGY | Facility: CLINIC | Age: 57
End: 2017-06-12
Attending: PHYSICIAN ASSISTANT
Payer: COMMERCIAL

## 2017-06-12 VITALS
HEART RATE: 86 BPM | TEMPERATURE: 98.2 F | WEIGHT: 315 LBS | DIASTOLIC BLOOD PRESSURE: 85 MMHG | OXYGEN SATURATION: 97 % | SYSTOLIC BLOOD PRESSURE: 124 MMHG | RESPIRATION RATE: 16 BRPM | BODY MASS INDEX: 41.76 KG/M2

## 2017-06-12 DIAGNOSIS — C15.5 CANCER OF DISTAL THIRD OF ESOPHAGUS (H): Primary | ICD-10-CM

## 2017-06-12 LAB
ALBUMIN SERPL-MCNC: 3.3 G/DL (ref 3.4–5)
ALP SERPL-CCNC: 93 U/L (ref 40–150)
ALT SERPL W P-5'-P-CCNC: 24 U/L (ref 0–70)
ANION GAP SERPL CALCULATED.3IONS-SCNC: 8 MMOL/L (ref 3–14)
AST SERPL W P-5'-P-CCNC: 20 U/L (ref 0–45)
BASOPHILS # BLD AUTO: 0 10E9/L (ref 0–0.2)
BASOPHILS NFR BLD AUTO: 0.3 %
BILIRUB SERPL-MCNC: 0.6 MG/DL (ref 0.2–1.3)
BUN SERPL-MCNC: 12 MG/DL (ref 7–30)
CALCIUM SERPL-MCNC: 8.7 MG/DL (ref 8.5–10.1)
CHLORIDE SERPL-SCNC: 111 MMOL/L (ref 94–109)
CO2 SERPL-SCNC: 25 MMOL/L (ref 20–32)
CREAT SERPL-MCNC: 0.88 MG/DL (ref 0.66–1.25)
DIFFERENTIAL METHOD BLD: ABNORMAL
EOSINOPHIL # BLD AUTO: 0.1 10E9/L (ref 0–0.7)
EOSINOPHIL NFR BLD AUTO: 1.4 %
ERYTHROCYTE [DISTWIDTH] IN BLOOD BY AUTOMATED COUNT: 14.6 % (ref 10–15)
GFR SERPL CREATININE-BSD FRML MDRD: 89 ML/MIN/1.7M2
GLUCOSE SERPL-MCNC: 120 MG/DL (ref 70–99)
HCT VFR BLD AUTO: 41.4 % (ref 40–53)
HGB BLD-MCNC: 13.9 G/DL (ref 13.3–17.7)
IMM GRANULOCYTES # BLD: 0 10E9/L (ref 0–0.4)
IMM GRANULOCYTES NFR BLD: 0.3 %
LYMPHOCYTES # BLD AUTO: 1 10E9/L (ref 0.8–5.3)
LYMPHOCYTES NFR BLD AUTO: 28.2 %
MCH RBC QN AUTO: 29.2 PG (ref 26.5–33)
MCHC RBC AUTO-ENTMCNC: 33.6 G/DL (ref 31.5–36.5)
MCV RBC AUTO: 87 FL (ref 78–100)
MONOCYTES # BLD AUTO: 0.3 10E9/L (ref 0–1.3)
MONOCYTES NFR BLD AUTO: 7.2 %
NEUTROPHILS # BLD AUTO: 2.3 10E9/L (ref 1.6–8.3)
NEUTROPHILS NFR BLD AUTO: 62.6 %
NRBC # BLD AUTO: 0 10*3/UL
NRBC BLD AUTO-RTO: 0 /100
PLATELET # BLD AUTO: 116 10E9/L (ref 150–450)
POTASSIUM SERPL-SCNC: 4 MMOL/L (ref 3.4–5.3)
PROT SERPL-MCNC: 6.8 G/DL (ref 6.8–8.8)
RBC # BLD AUTO: 4.76 10E12/L (ref 4.4–5.9)
SODIUM SERPL-SCNC: 144 MMOL/L (ref 133–144)
WBC # BLD AUTO: 3.6 10E9/L (ref 4–11)

## 2017-06-12 PROCEDURE — 25000128 H RX IP 250 OP 636: Mod: ZF | Performed by: PHYSICIAN ASSISTANT

## 2017-06-12 PROCEDURE — 96415 CHEMO IV INFUSION ADDL HR: CPT

## 2017-06-12 PROCEDURE — 96411 CHEMO IV PUSH ADDL DRUG: CPT

## 2017-06-12 PROCEDURE — 96375 TX/PRO/DX INJ NEW DRUG ADDON: CPT

## 2017-06-12 PROCEDURE — 96416 CHEMO PROLONG INFUSE W/PUMP: CPT

## 2017-06-12 PROCEDURE — 25000125 ZZHC RX 250: Mod: ZF | Performed by: INTERNAL MEDICINE

## 2017-06-12 PROCEDURE — 25000128 H RX IP 250 OP 636: Mod: ZF | Performed by: INTERNAL MEDICINE

## 2017-06-12 PROCEDURE — 80053 COMPREHEN METABOLIC PANEL: CPT | Performed by: INTERNAL MEDICINE

## 2017-06-12 PROCEDURE — 85025 COMPLETE CBC W/AUTO DIFF WBC: CPT | Performed by: INTERNAL MEDICINE

## 2017-06-12 PROCEDURE — 96413 CHEMO IV INFUSION 1 HR: CPT

## 2017-06-12 PROCEDURE — 96368 THER/DIAG CONCURRENT INF: CPT

## 2017-06-12 PROCEDURE — 36415 COLL VENOUS BLD VENIPUNCTURE: CPT | Performed by: INTERNAL MEDICINE

## 2017-06-12 RX ORDER — HEPARIN SODIUM (PORCINE) LOCK FLUSH IV SOLN 100 UNIT/ML 100 UNIT/ML
5 SOLUTION INTRAVENOUS
Status: COMPLETED | OUTPATIENT
Start: 2017-06-12 | End: 2017-06-12

## 2017-06-12 RX ORDER — PALONOSETRON 0.05 MG/ML
0.25 INJECTION, SOLUTION INTRAVENOUS ONCE
Status: COMPLETED | OUTPATIENT
Start: 2017-06-12 | End: 2017-06-12

## 2017-06-12 RX ORDER — FLUOROURACIL 50 MG/ML
400 INJECTION, SOLUTION INTRAVENOUS ONCE
Status: COMPLETED | OUTPATIENT
Start: 2017-06-12 | End: 2017-06-12

## 2017-06-12 RX ADMIN — FLUOROURACIL 1220 MG: 50 INJECTION, SOLUTION INTRAVENOUS at 12:00

## 2017-06-12 RX ADMIN — SODIUM CHLORIDE, PRESERVATIVE FREE 5 ML: 5 INJECTION INTRAVENOUS at 07:48

## 2017-06-12 RX ADMIN — PALONOSETRON HYDROCHLORIDE 0.25 MG: 0.25 INJECTION INTRAVENOUS at 08:45

## 2017-06-12 RX ADMIN — OXALIPLATIN 250 MG: 5 INJECTION, SOLUTION, CONCENTRATE INTRAVENOUS at 10:00

## 2017-06-12 RX ADMIN — DEXTROSE MONOHYDRATE 250 ML: 50 INJECTION, SOLUTION INTRAVENOUS at 09:30

## 2017-06-12 RX ADMIN — DEXAMETHASONE SODIUM PHOSPHATE: 10 INJECTION, SOLUTION INTRAMUSCULAR; INTRAVENOUS at 08:47

## 2017-06-12 RX ADMIN — SODIUM CHLORIDE 150 MG: 9 INJECTION, SOLUTION INTRAVENOUS at 09:07

## 2017-06-12 RX ADMIN — LEUCOVORIN CALCIUM 1050 MG: 500 INJECTION, POWDER, LYOPHILIZED, FOR SOLUTION INTRAMUSCULAR; INTRAVENOUS at 09:56

## 2017-06-12 ASSESSMENT — PAIN SCALES - GENERAL: PAINLEVEL: NO PAIN (0)

## 2017-06-12 NOTE — PROGRESS NOTES
Infusion Nursing Note:    Patient presents today for Cycle 3 Day 1 Oxaliplatin, Leucovorin, Fluorouracil bolus/pump.  Arrived with sister.    Lab Results   Component Value Date    HGB 13.9 06/12/2017     Lab Results   Component Value Date    WBC 3.6 06/12/2017      Lab Results   Component Value Date    ANEU 2.3 06/12/2017     Lab Results   Component Value Date     06/12/2017      Lab Results   Component Value Date     06/12/2017                   Lab Results   Component Value Date    POTASSIUM 4.0 06/12/2017           No results found for: MAG         Lab Results   Component Value Date    CR 0.88 06/12/2017                   Lab Results   Component Value Date    NIDHI 8.7 06/12/2017                Lab Results   Component Value Date    BILITOTAL 0.6 06/12/2017           Lab Results   Component Value Date    ALBUMIN 3.3 06/12/2017                    Lab Results   Component Value Date    ALT 24 06/12/2017           Lab Results   Component Value Date    AST 20 06/12/2017     Results reviewed, labs MET treatment parameters, ok to proceed with treatment.    Note: Pt arrives to infusion feeling better than last cycle. He took his compazine on a schedule, and had less nausea and no vomiting. Was supposed to see Nicole HILL today prior to infusion, but this was not scheduled. According to Nicole, have patient see provider prior to next cycle.     Intravenous Access:  Implanted Port.    Post Infusion Assessment:  Patient tolerated infusion without incident.  Blood return noted pre and post infusion.  Fluorouracil C series pump hooked up at 1205 to run at 5.2cc/hr for 46 hours. Called and spoke to Sadia from [facility], patient's pump will be disconnected at the [place] on Wed 6/14 at 1000.    Discharge Plan:   Prescription refills given for ambien and compazine.  Discharge instructions reviewed with: Patient.  Copy of AVS reviewed with patient and/or family.  Patient will return 6/27 for next appointment.  Patient  discharged in stable condition accompanied by: sister.  Departure Mode: Ambulatory.  Face to Face time: 2 minutes.

## 2017-06-12 NOTE — PATIENT INSTRUCTIONS
Contact Numbers    [facility] Main Line: [phone]  [facility] Triage:  [phone]    Call triage with chills and/or temperature greater than or equal to 100.5, uncontrolled nausea/vomiting, diarrhea, constipation, dizziness, shortness of breath, chest pain, bleeding, unexplained bruising, or any new/concerning symptoms, questions/concerns.     If you are having any concerning symptoms or wish to speak to a provider before your next infusion visit, please call your care coordinator or triage to notify them so we can adequately serve you.       After Hours: [phone]    If after hours, weekends, or holidays, call main hospital  and ask for Oncology doctor on call.           June 2017 Sunday Monday Tuesday Wednesday Thursday Friday Saturday                       1     2     3       4     5     6     7     8     9     10       11     12     [facility]    7:30 AM   (15 min.)    [facility]   [facility] Lab Draw     [facility] INFUSION 240    8:00 AM   (240 min.)    ONCOLOGY INFUSION   [facility] 13     14     15     16     17       18     19     20     21     22     23     24       25     26     27     [facility]    9:00 AM   (15 min.)    [facility]   [facility] Lab Draw     [facility] INFUSION 240    9:30 AM   (240 min.)    ONCOLOGY INFUSION   [facility] 28 29 30 July 2017 Sunday Monday Tuesday Wednesday Thursday Friday Saturday                                 1       2     3     4     5     6     7     8       9     10     11     12     13     14     15       16     17     18     19     20     21     22       23     24     25     26     27     28     29       30     31                                          Recent Results (from the past 24 hour(s))   CBC with platelets differential    Collection Time: 06/12/17  7:54 AM   Result Value Ref Range    WBC 3.6 (L) 4.0 - 11.0 10e9/L    RBC Count 4.76 4.4 -  5.9 10e12/L    Hemoglobin 13.9 13.3 - 17.7 g/dL    Hematocrit 41.4 40.0 - 53.0 %    MCV 87 78 - 100 fl    MCH 29.2 26.5 - 33.0 pg    MCHC 33.6 31.5 - 36.5 g/dL    RDW 14.6 10.0 - 15.0 %    Platelet Count 116 (L) 150 - 450 10e9/L    Diff Method Automated Method     % Neutrophils 62.6 %    % Lymphocytes 28.2 %    % Monocytes 7.2 %    % Eosinophils 1.4 %    % Basophils 0.3 %    % Immature Granulocytes 0.3 %    Nucleated RBCs 0 0 /100    Absolute Neutrophil 2.3 1.6 - 8.3 10e9/L    Absolute Lymphocytes 1.0 0.8 - 5.3 10e9/L    Absolute Monocytes 0.3 0.0 - 1.3 10e9/L    Absolute Eosinophils 0.1 0.0 - 0.7 10e9/L    Absolute Basophils 0.0 0.0 - 0.2 10e9/L    Abs Immature Granulocytes 0.0 0 - 0.4 10e9/L    Absolute Nucleated RBC 0.0    Comprehensive metabolic panel    Collection Time: 06/12/17  7:54 AM   Result Value Ref Range    Sodium 144 133 - 144 mmol/L    Potassium 4.0 3.4 - 5.3 mmol/L    Chloride 111 (H) 94 - 109 mmol/L    Carbon Dioxide 25 20 - 32 mmol/L    Anion Gap 8 3 - 14 mmol/L    Glucose 120 (H) 70 - 99 mg/dL    Urea Nitrogen 12 7 - 30 mg/dL    Creatinine 0.88 0.66 - 1.25 mg/dL    GFR Estimate 89 >60 mL/min/1.7m2    GFR Estimate If Black >90   GFR Calc   >60 mL/min/1.7m2    Calcium 8.7 8.5 - 10.1 mg/dL    Bilirubin Total 0.6 0.2 - 1.3 mg/dL    Albumin 3.3 (L) 3.4 - 5.0 g/dL    Protein Total 6.8 6.8 - 8.8 g/dL    Alkaline Phosphatase 93 40 - 150 U/L    ALT 24 0 - 70 U/L    AST 20 0 - 45 U/L

## 2017-06-12 NOTE — NURSING NOTE
"Chief Complaint   Patient presents with     Port Draw     port accessed and labs drawn by rn.  vs taken.     Port accessed with 20g 3/4\" power needle, labs drawn, port flushed with saline and heparin, vitals checked, pt checked in for next appointment.  Gail De La Torre RN    "

## 2017-06-12 NOTE — MR AVS SNAPSHOT
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     If you are having any concerning symptoms or wish to speak to a provider before your next infusion visit, please call your care coordinator or triage to notify them so we can adequately serve you.       After Hours: [phone]    If after hours, weekends, or holidays, call main hospital  and ask for Oncology doctor on call.           June 2017 Sunday Monday Tuesday Wednesday Thursday Friday Saturday                       1     2     3       4     5     6     7     8     9     10       11     12     [facility]    7:30 AM   (15 min.)    [facility]   [facility] Lab Draw     [facility] INFUSION 240    8:00 AM   (240 min.)    ONCOLOGY INFUSION   [facility] 13     14     15     16     17       18     19     20     21     22     23     24       25     26     27     [facility]    9:00 AM   (15 min.)    [facility]   [facility] Lab Draw     [facility] INFUSION 240    9:30 AM   (240 min.)    ONCOLOGY INFUSION   [facility] 28 29 30 July 2017 Sunday Monday Tuesday Wednesday Thursday Friday Saturday                                 1       2     3     4     5     6     7     8       9      10     11     12     13     14     15       16     17     18     19     20     21     22       23     24     25     26     27     28     29       30     31                                          Recent Results (from the past 24 hour(s))   CBC with platelets differential    Collection Time: 06/12/17  7:54 AM   Result Value Ref Range    WBC 3.6 (L) 4.0 - 11.0 10e9/L    RBC Count 4.76 4.4 - 5.9 10e12/L    Hemoglobin 13.9 13.3 - 17.7 g/dL    Hematocrit 41.4 40.0 - 53.0 %    MCV 87 78 - 100 fl    MCH 29.2 26.5 - 33.0 pg    MCHC 33.6 31.5 - 36.5 g/dL    RDW 14.6 10.0 - 15.0 %    Platelet Count 116 (L) 150 - 450 10e9/L    Diff Method Automated Method     % Neutrophils 62.6 %    % Lymphocytes 28.2 %    % Monocytes 7.2 %    % Eosinophils 1.4 %    % Basophils 0.3 %    % Immature Granulocytes 0.3 %    Nucleated RBCs 0 0 /100    Absolute Neutrophil 2.3 1.6 - 8.3 10e9/L    Absolute Lymphocytes 1.0 0.8 - 5.3 10e9/L    Absolute Monocytes 0.3 0.0 - 1.3 10e9/L    Absolute Eosinophils 0.1 0.0 - 0.7 10e9/L    Absolute Basophils 0.0 0.0 - 0.2 10e9/L    Abs Immature Granulocytes 0.0 0 - 0.4 10e9/L    Absolute Nucleated RBC 0.0    Comprehensive metabolic panel    Collection Time: 06/12/17  7:54 AM   Result Value Ref Range    Sodium 144 133 - 144 mmol/L    Potassium 4.0 3.4 - 5.3 mmol/L    Chloride 111 (H) 94 - 109 mmol/L    Carbon Dioxide 25 20 - 32 mmol/L    Anion Gap 8 3 - 14 mmol/L    Glucose 120 (H) 70 - 99 mg/dL    Urea Nitrogen 12 7 - 30 mg/dL    Creatinine 0.88 0.66 - 1.25 mg/dL    GFR Estimate 89 >60 mL/min/1.7m2    GFR Estimate If Black >90   GFR Calc   >60 mL/min/1.7m2    Calcium 8.7 8.5 - 10.1 mg/dL    Bilirubin Total 0.6 0.2 - 1.3 mg/dL    Albumin 3.3 (L) 3.4 - 5.0 g/dL    Protein Total 6.8 6.8 - 8.8 g/dL    Alkaline Phosphatase 93 40 - 150 U/L    ALT 24 0 - 70 U/L    AST 20 0 - 45 U/L               Follow-ups after your visit        Your next 10 appointments already scheduled     Jun 27, 2017  9:00 AM YIFAN    [facility]  [facility]   [facility] Lab Draw ([facility])    [address]  [address] [facility] [address] [phone]            Jun 27, 2017  9:30 AM CDT   Infusion 240 with  ONCOLOGY INFUSION, UC 27 ATC   [facility] ([facility])    [address] [address] [phone]              Who to contact     If you have questions or need follow up information about today's clinic visit or your schedule please contact [facility] directly at [phone].  Normal or non-critical lab and imaging results will be communicated to you by Ethical Dealhart, letter or phone within 4 business days after the clinic has received the results. If you do not hear from us within 7 days, please contact the clinic through ClickDeliveryt or phone. If you have a critical or abnormal lab result, we will notify you by phone as soon as possible.  Submit refill requests through Sleep Number or call your pharmacy and they will forward the refill request to us. Please allow 3 business days for your refill to be completed.          Additional Information About Your Visit        Ethical Dealhart Information     Sleep Number gives you secure access to your electronic health record. If you see a primary care provider, you can also send messages to your care team and make appointments. If you have questions, please call your primary care clinic.  If you do not have a primary care provider, please call [phone] and they will assist you.        Care EveryWhere ID     This is your Care EveryWhere ID. This could be used by other organizations to access your [place] medical records  [id number]        Your Vitals Were     Pulse Temperature Respirations Pulse Oximetry BMI (Body Mass Index)       86 98.2  F (36.8  C) (Oral) 16 97% 41.76 kg/m2        Blood Pressure from Last 3 Encounters:   06/12/17 124/85   05/30/17  117/74   05/15/17 118/82    Weight from Last 3 Encounters:   06/12/17 (!) 163.9 kg (361 lb 6.4 oz)   05/30/17 (!) 165.8 kg (365 lb 9.6 oz)   05/15/17 (!) 165.5 kg (364 lb 14.4 oz)              We Performed the Following     CBC with platelets differential     Comprehensive metabolic panel        Primary Care Provider    Unknown Primary MD Sydney       No address on file        Thank you!     Thank you for choosing [facility]  for your care. Our goal is always to provide you with excellent care. Hearing back from our patients is one way we can continue to improve our services. Please take a few minutes to complete the written survey that you may receive in the mail after your visit with us. Thank you!             Your Updated Medication List - Protect others around you: Learn how to safely use, store and throw away your medicines at www.disposemymeds.org.          This list is accurate as of: 6/12/17  9:06 AM.  Always use your most recent med list.                   Brand Name Dispense Instructions for use    fish oil-omega-3 fatty acids 1000 MG capsule      Take 2 g by mouth daily       IBUPROFEN PO      Take 800 mg by mouth every 8 hours as needed for moderate pain       latanoprost 0.005 % ophthalmic solution    XALATAN     Place into both eyes At Bedtime       lidocaine-prilocaine cream    EMLA    30 g    Apply topically as needed for moderate pain       * LORazepam 0.5 MG tablet    ATIVAN    60 tablet    Take 1 tablet (0.5 mg) by mouth every 6 hours as needed for anxiety       * LORazepam 0.5 MG tablet    ATIVAN    30 tablet    Take 1 tablet (0.5 mg) by mouth every 4 hours as needed (Anxiety, Nausea/Vomiting or Sleep)       Multi-vitamin Tabs tablet      Take 1 tablet by mouth daily       ondansetron 8 MG tablet    ZOFRAN    30 tablet    Take 1 tablet (8 mg) by mouth every 8 hours as needed (Nausea/Vomiting)       prochlorperazine 10 MG tablet    COMPAZINE    30 tablet    Take 1 tablet (10 mg)  by mouth every 6 hours as needed (Nausea/Vomiting)       timolol 0.5 % ophthalmic solution    TIMOPTIC     Place into both eyes daily       zolpidem 5 MG tablet    AMBIEN    60 tablet    Take 1-2 tablets each evening as needed       * Notice:  This list has 2 medication(s) that are the same as other medications prescribed for you. Read the directions carefully, and ask your doctor or other care provider to review them with you.

## 2017-06-13 ASSESSMENT — ENCOUNTER SYMPTOMS
SINUS PAIN: 0
WEIGHT GAIN: 0
DIARRHEA: 1
SMELL DISTURBANCE: 0
TINGLING: 1
SINUS CONGESTION: 0
PARALYSIS: 0
JOINT SWELLING: 0
NIGHT SWEATS: 0
NAIL CHANGES: 0
INCREASED ENERGY: 1
BACK PAIN: 1
MYALGIAS: 1
DECREASED APPETITE: 0
CLAUDICATION: 1
EYE IRRITATION: 1
DOUBLE VISION: 1
BLOATING: 0
ARTHRALGIAS: 0
CHILLS: 0
TREMORS: 0
PANIC: 0
DIZZINESS: 1
DECREASED CONCENTRATION: 1
TACHYCARDIA: 0
JAUNDICE: 0
SORE THROAT: 0
PALPITATIONS: 1
CONSTIPATION: 1
FATIGUE: 1
HEADACHES: 0
RECTAL PAIN: 0
SEIZURES: 0
MEMORY LOSS: 1
SLEEP DISTURBANCES DUE TO BREATHING: 0
LEG PAIN: 1
RECTAL BLEEDING: 0
NUMBNESS: 1
ALTERED TEMPERATURE REGULATION: 1
MUSCLE WEAKNESS: 0
DEPRESSION: 0
HYPERTENSION: 0
NAUSEA: 1
FEVER: 0
HYPOTENSION: 0
HALLUCINATIONS: 0
HOARSE VOICE: 0
MUSCLE CRAMPS: 1
POOR WOUND HEALING: 0
SYNCOPE: 0
BOWEL INCONTINENCE: 0
WEIGHT LOSS: 1
NERVOUS/ANXIOUS: 0
LOSS OF CONSCIOUSNESS: 0
TROUBLE SWALLOWING: 1
EYE WATERING: 0
EYE REDNESS: 1
WEAKNESS: 0
ORTHOPNEA: 0
SKIN CHANGES: 0
POLYDIPSIA: 0
STIFFNESS: 1
ABDOMINAL PAIN: 0
EYE PAIN: 0
INSOMNIA: 1
NECK MASS: 0
BLOOD IN STOOL: 0
DISTURBANCES IN COORDINATION: 1
VOMITING: 0
TASTE DISTURBANCE: 0
HEARTBURN: 0
EXERCISE INTOLERANCE: 0
POLYPHAGIA: 0
NECK PAIN: 0
SPEECH CHANGE: 0
LEG SWELLING: 1
LIGHT-HEADEDNESS: 1

## 2017-06-13 NOTE — PROGRESS NOTES
This is a snapshot of the patient's [facility] medical record. For complete information or questions call [phone] or In [facility],  Home Infusion ([id number]).  [facility] Number:  [id number]

## 2017-06-14 ENCOUNTER — HOME INFUSION (PRE-WILLOW HOME INFUSION) (OUTPATIENT)
Dept: PHARMACY | Facility: CLINIC | Age: 57
End: 2017-06-14

## 2017-06-14 NOTE — PROGRESS NOTES
This is a snapshot of the patient's [facility] medical record. For complete information or questions call [phone] or In [facility],  Home Infusion ([id number]).  [facility] Number:  [id number]

## 2017-06-15 DIAGNOSIS — C15.5 CANCER OF DISTAL THIRD OF ESOPHAGUS (H): Primary | ICD-10-CM

## 2017-06-27 ENCOUNTER — ONCOLOGY VISIT (OUTPATIENT)
Dept: ONCOLOGY | Facility: CLINIC | Age: 57
End: 2017-06-27
Attending: PHYSICIAN ASSISTANT
Payer: COMMERCIAL

## 2017-06-27 ENCOUNTER — INFUSION THERAPY VISIT (OUTPATIENT)
Dept: ONCOLOGY | Facility: CLINIC | Age: 57
End: 2017-06-27
Attending: INTERNAL MEDICINE
Payer: COMMERCIAL

## 2017-06-27 ENCOUNTER — APPOINTMENT (OUTPATIENT)
Dept: LAB | Facility: CLINIC | Age: 57
End: 2017-06-27
Attending: INTERNAL MEDICINE
Payer: COMMERCIAL

## 2017-06-27 ENCOUNTER — DOCUMENTATION ONLY (OUTPATIENT)
Dept: ONCOLOGY | Facility: CLINIC | Age: 57
End: 2017-06-27

## 2017-06-27 ENCOUNTER — HOME INFUSION (PRE-WILLOW HOME INFUSION) (OUTPATIENT)
Dept: PHARMACY | Facility: CLINIC | Age: 57
End: 2017-06-27

## 2017-06-27 VITALS
RESPIRATION RATE: 16 BRPM | TEMPERATURE: 98.2 F | HEIGHT: 78 IN | HEART RATE: 53 BPM | SYSTOLIC BLOOD PRESSURE: 132 MMHG | DIASTOLIC BLOOD PRESSURE: 80 MMHG | WEIGHT: 315 LBS | BODY MASS INDEX: 36.45 KG/M2 | OXYGEN SATURATION: 97 %

## 2017-06-27 DIAGNOSIS — G62.9 NEUROPATHY: ICD-10-CM

## 2017-06-27 DIAGNOSIS — C15.5 CANCER OF DISTAL THIRD OF ESOPHAGUS (H): Primary | ICD-10-CM

## 2017-06-27 DIAGNOSIS — R11.0 NAUSEA: ICD-10-CM

## 2017-06-27 LAB
ALBUMIN SERPL-MCNC: 3.2 G/DL (ref 3.4–5)
ALP SERPL-CCNC: 99 U/L (ref 40–150)
ALT SERPL W P-5'-P-CCNC: 26 U/L (ref 0–70)
ANION GAP SERPL CALCULATED.3IONS-SCNC: 6 MMOL/L (ref 3–14)
AST SERPL W P-5'-P-CCNC: 21 U/L (ref 0–45)
BASOPHILS # BLD AUTO: 0 10E9/L (ref 0–0.2)
BASOPHILS NFR BLD AUTO: 0.7 %
BILIRUB SERPL-MCNC: 0.6 MG/DL (ref 0.2–1.3)
BUN SERPL-MCNC: 11 MG/DL (ref 7–30)
CALCIUM SERPL-MCNC: 8.8 MG/DL (ref 8.5–10.1)
CHLORIDE SERPL-SCNC: 110 MMOL/L (ref 94–109)
CO2 SERPL-SCNC: 26 MMOL/L (ref 20–32)
CREAT SERPL-MCNC: 0.77 MG/DL (ref 0.66–1.25)
DIFFERENTIAL METHOD BLD: ABNORMAL
EOSINOPHIL # BLD AUTO: 0 10E9/L (ref 0–0.7)
EOSINOPHIL NFR BLD AUTO: 0.7 %
ERYTHROCYTE [DISTWIDTH] IN BLOOD BY AUTOMATED COUNT: 16 % (ref 10–15)
GFR SERPL CREATININE-BSD FRML MDRD: ABNORMAL ML/MIN/1.7M2
GLUCOSE SERPL-MCNC: 98 MG/DL (ref 70–99)
HCT VFR BLD AUTO: 37.6 % (ref 40–53)
HGB BLD-MCNC: 12.7 G/DL (ref 13.3–17.7)
IMM GRANULOCYTES # BLD: 0 10E9/L (ref 0–0.4)
IMM GRANULOCYTES NFR BLD: 0.3 %
LYMPHOCYTES # BLD AUTO: 0.9 10E9/L (ref 0.8–5.3)
LYMPHOCYTES NFR BLD AUTO: 30.8 %
MCH RBC QN AUTO: 29.5 PG (ref 26.5–33)
MCHC RBC AUTO-ENTMCNC: 33.8 G/DL (ref 31.5–36.5)
MCV RBC AUTO: 87 FL (ref 78–100)
MONOCYTES # BLD AUTO: 0.3 10E9/L (ref 0–1.3)
MONOCYTES NFR BLD AUTO: 10.9 %
NEUTROPHILS # BLD AUTO: 1.7 10E9/L (ref 1.6–8.3)
NEUTROPHILS NFR BLD AUTO: 56.6 %
NRBC # BLD AUTO: 0 10*3/UL
NRBC BLD AUTO-RTO: 0 /100
PLATELET # BLD AUTO: 85 10E9/L (ref 150–450)
POTASSIUM SERPL-SCNC: 4 MMOL/L (ref 3.4–5.3)
PROT SERPL-MCNC: 6.8 G/DL (ref 6.8–8.8)
RBC # BLD AUTO: 4.3 10E12/L (ref 4.4–5.9)
SODIUM SERPL-SCNC: 142 MMOL/L (ref 133–144)
WBC # BLD AUTO: 3 10E9/L (ref 4–11)

## 2017-06-27 PROCEDURE — 96375 TX/PRO/DX INJ NEW DRUG ADDON: CPT

## 2017-06-27 PROCEDURE — 99214 OFFICE O/P EST MOD 30 MIN: CPT | Mod: ZP | Performed by: PHYSICIAN ASSISTANT

## 2017-06-27 PROCEDURE — 96416 CHEMO PROLONG INFUSE W/PUMP: CPT

## 2017-06-27 PROCEDURE — 96367 TX/PROPH/DG ADDL SEQ IV INF: CPT

## 2017-06-27 PROCEDURE — 99212 OFFICE O/P EST SF 10 MIN: CPT | Mod: ZF

## 2017-06-27 PROCEDURE — 85025 COMPLETE CBC W/AUTO DIFF WBC: CPT | Performed by: INTERNAL MEDICINE

## 2017-06-27 PROCEDURE — 93010 ELECTROCARDIOGRAM REPORT: CPT | Performed by: INTERNAL MEDICINE

## 2017-06-27 PROCEDURE — 25000128 H RX IP 250 OP 636: Mod: ZF | Performed by: PHYSICIAN ASSISTANT

## 2017-06-27 PROCEDURE — 96413 CHEMO IV INFUSION 1 HR: CPT

## 2017-06-27 PROCEDURE — 25000125 ZZHC RX 250: Mod: ZF | Performed by: PHYSICIAN ASSISTANT

## 2017-06-27 PROCEDURE — 96411 CHEMO IV PUSH ADDL DRUG: CPT

## 2017-06-27 PROCEDURE — 80053 COMPREHEN METABOLIC PANEL: CPT | Performed by: INTERNAL MEDICINE

## 2017-06-27 PROCEDURE — 96415 CHEMO IV INFUSION ADDL HR: CPT

## 2017-06-27 PROCEDURE — 36415 COLL VENOUS BLD VENIPUNCTURE: CPT | Performed by: INTERNAL MEDICINE

## 2017-06-27 PROCEDURE — 96368 THER/DIAG CONCURRENT INF: CPT

## 2017-06-27 RX ORDER — PALONOSETRON 0.05 MG/ML
0.25 INJECTION, SOLUTION INTRAVENOUS ONCE
Status: CANCELLED | OUTPATIENT
Start: 2017-06-27 | End: 2017-06-27

## 2017-06-27 RX ORDER — PALONOSETRON 0.05 MG/ML
0.25 INJECTION, SOLUTION INTRAVENOUS ONCE
Status: COMPLETED | OUTPATIENT
Start: 2017-06-27 | End: 2017-06-27

## 2017-06-27 RX ORDER — METHYLPREDNISOLONE SODIUM SUCCINATE 125 MG/2ML
125 INJECTION, POWDER, LYOPHILIZED, FOR SOLUTION INTRAMUSCULAR; INTRAVENOUS
Status: CANCELLED
Start: 2017-06-27

## 2017-06-27 RX ORDER — SODIUM CHLORIDE 9 MG/ML
1000 INJECTION, SOLUTION INTRAVENOUS CONTINUOUS PRN
Status: CANCELLED
Start: 2017-06-27

## 2017-06-27 RX ORDER — DIPHENHYDRAMINE HYDROCHLORIDE 50 MG/ML
50 INJECTION INTRAMUSCULAR; INTRAVENOUS
Status: CANCELLED
Start: 2017-06-27

## 2017-06-27 RX ORDER — ALBUTEROL SULFATE 90 UG/1
1-2 AEROSOL, METERED RESPIRATORY (INHALATION)
Status: CANCELLED
Start: 2017-06-27

## 2017-06-27 RX ORDER — LORAZEPAM 2 MG/ML
0.5 INJECTION INTRAMUSCULAR EVERY 4 HOURS PRN
Status: CANCELLED
Start: 2017-06-27

## 2017-06-27 RX ORDER — HEPARIN SODIUM (PORCINE) LOCK FLUSH IV SOLN 100 UNIT/ML 100 UNIT/ML
500 SOLUTION INTRAVENOUS EVERY 8 HOURS
Status: DISCONTINUED | OUTPATIENT
Start: 2017-06-27 | End: 2017-06-27 | Stop reason: HOSPADM

## 2017-06-27 RX ORDER — EPINEPHRINE 0.3 MG/.3ML
0.3 INJECTION SUBCUTANEOUS EVERY 5 MIN PRN
Status: CANCELLED | OUTPATIENT
Start: 2017-06-27

## 2017-06-27 RX ORDER — MEPERIDINE HYDROCHLORIDE 25 MG/ML
25 INJECTION INTRAMUSCULAR; INTRAVENOUS; SUBCUTANEOUS EVERY 30 MIN PRN
Status: CANCELLED | OUTPATIENT
Start: 2017-06-27

## 2017-06-27 RX ORDER — ALBUTEROL SULFATE 0.83 MG/ML
2.5 SOLUTION RESPIRATORY (INHALATION)
Status: CANCELLED | OUTPATIENT
Start: 2017-06-27

## 2017-06-27 RX ORDER — FLUOROURACIL 50 MG/ML
400 INJECTION, SOLUTION INTRAVENOUS ONCE
Status: COMPLETED | OUTPATIENT
Start: 2017-06-27 | End: 2017-06-27

## 2017-06-27 RX ORDER — FLUOROURACIL 50 MG/ML
400 INJECTION, SOLUTION INTRAVENOUS ONCE
Status: CANCELLED | OUTPATIENT
Start: 2017-06-27

## 2017-06-27 RX ADMIN — PALONOSETRON HYDROCHLORIDE 0.25 MG: 0.25 INJECTION INTRAVENOUS at 10:00

## 2017-06-27 RX ADMIN — SODIUM CHLORIDE, PRESERVATIVE FREE 500 UNITS: 5 INJECTION INTRAVENOUS at 08:33

## 2017-06-27 RX ADMIN — DEXTROSE MONOHYDRATE 250 ML: 50 INJECTION, SOLUTION INTRAVENOUS at 10:00

## 2017-06-27 RX ADMIN — SODIUM CHLORIDE 150 MG: 9 INJECTION, SOLUTION INTRAVENOUS at 10:17

## 2017-06-27 RX ADMIN — FLUOROURACIL 1220 MG: 50 INJECTION, SOLUTION INTRAVENOUS at 12:43

## 2017-06-27 RX ADMIN — LEUCOVORIN CALCIUM 1050 MG: 500 INJECTION, POWDER, LYOPHILIZED, FOR SOLUTION INTRAMUSCULAR; INTRAVENOUS at 10:44

## 2017-06-27 RX ADMIN — DEXAMETHASONE SODIUM PHOSPHATE: 10 INJECTION, SOLUTION INTRAMUSCULAR; INTRAVENOUS at 10:02

## 2017-06-27 RX ADMIN — OXALIPLATIN 250 MG: 5 INJECTION, SOLUTION, CONCENTRATE INTRAVENOUS at 10:41

## 2017-06-27 ASSESSMENT — PAIN SCALES - GENERAL: PAINLEVEL: NO PAIN (0)

## 2017-06-27 NOTE — PATIENT INSTRUCTIONS
Since you get Aloxi day 1, you do not need to take Ondansetron (Zofran) until day 3. So this cycle start zofran 3 times a day Friday through Monday. In the meantime you can take compazine and/or ativan.     Carry imodium in the truck in case of diarrhea    Senna as needed for constipation.

## 2017-06-27 NOTE — LETTER
6/27/2017      RE: Reuben Padilla  [address]   [facility] [address]       [facility] CANCER CLINIC  FOLLOW-UP VISIT NOTE  Date of visit: 5-30-17        REASON FOR VISIT: metastatic esophageal CA, here for C4 of FOLFOX    HPI: Reuben reports that initially he noticed symptoms of dysphagia, mainly to solid food, starting in February of 2017.  Symptoms got progressively worse.   He was unable to eat solid food, and he needs to take a lot of fluid to push food down.  His dysphagia symptoms prompted subsequent evaluation with upper endoscopy and colonoscopy that was done in [place]. The colonoscopy revealed two tubular adenomas in the colon.  The upper endoscopy revealed an ulcerated mass 1-2 cm long, approximately 40 cm from the lips.  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       The patient was seen by Dr. Clemencia Carreon initially who ordered an ENT evaluation and supraclavicular lymph node FNA to rule out head and neck etiology of the cancer, since outside pathology was reported as SCC. The pathology slides were requested from [facility] for review by the [facility] Pathology Department. Our diagnosis is invasive  poorly differentiated adenocarcinoma.  The FNA of the supraclavicular LN was done that confirmed poorly differentiated adenocarcinoma of GI origin.  The tumor was P63 negative and CK7 negative.  Positive for cytokeratin 20.  The patient also had a PET/CT scan done that revealed widespread lavon metastasis and multiple liver mets that were consistent with metastatic disease. HER2 is negative.     Reuben started FOLFOX on 5/15/17 and presents today for C4.     INTERVAL HISTORY: Reuben is doing well. Side effects are better managed now with Aloxi, emend and scheduled zofran days 3-6 (although he was starting the zofran day 1). He is eating better and has re-gained some weight. Otherwise he  "notes some fatigue and cold sensitivity. He says only one day he can eat ice cream. He has some baseline neuropathy in the feet which hasn't worsened with chemo. 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 He has family here. He is able to work ok the second week of chemo. No mucositis. No fevers, chills, headaches. His dysphagia has improved with chemo, he can eat almost everything now although he avoids bread. Also, esophogeal pain has all but completely resolved. He doesn't take anything for this. He has constipation followed by diarrhea which he manages with senna then  Imodium. He has a tough time sleeping as he processes his illness at night. He takes 2 ambien and still struggles. He finds his time at [employer] helpful as he talks to other people with similar health situations. Otherwise 10 pt ROS negative.       EXAM:  /80  Pulse 53  Temp 98.2  F (36.8  C)  Resp 16  Ht 1.981 m (6' 5.99\")  Wt (!) 166.9 kg (367 lb 14.4 oz)  SpO2 97%  BMI 42.52 kg/m2  Wt Readings from Last 4 Encounters:   06/27/17 (!) 166.9 kg (367 lb 14.4 oz)   06/12/17 (!) 163.9 kg (361 lb 6.4 oz)   05/30/17 (!) 165.8 kg (365 lb 9.6 oz)   05/15/17 (!) 165.5 kg (364 lb 14.4 oz)     Vital signs were reviewed.   Patient alert and oriented x3.   PERRLA. EOMI. No scleral icterus noted. OP without thrush/sores.  Neck exam: No palpable cervical, supraclavicular or axillary nodes bilaterally.   Heart: RRR no murmurs noted. I manually took his pulse, it was 70bpm and regular  Lungs: clear to auscultation bilaterally.  No crackles or wheezing.   Abd: obese, deferred  Extremities: No lower extremity edema.   Neuro: grossly intact.   Mood and affect is stable.       LABS:   Results for KORI CHANEY (MRN [id number]) as of 6/27/2017 09:47   Ref. Range 6/27/2017 08:41   Sodium Latest Ref Range: 133 - 144 mmol/L 142   Potassium Latest Ref Range: 3.4 - 5.3 " mmol/L 4.0   Chloride Latest Ref Range: 94 - 109 mmol/L 110 (H)   Carbon Dioxide Latest Ref Range: 20 - 32 mmol/L 26   Urea Nitrogen Latest Ref Range: 7 - 30 mg/dL 11   Creatinine Latest Ref Range: 0.66 - 1.25 mg/dL 0.77   GFR Estimate Latest Ref Range: >60 mL/min/1.7m2 >90...   GFR Estimate If Black Latest Ref Range: >60 mL/min/1.7m2 >90...   Calcium Latest Ref Range: 8.5 - 10.1 mg/dL 8.8   Anion Gap Latest Ref Range: 3 - 14 mmol/L 6   Albumin Latest Ref Range: 3.4 - 5.0 g/dL 3.2 (L)   Protein Total Latest Ref Range: 6.8 - 8.8 g/dL 6.8   Bilirubin Total Latest Ref Range: 0.2 - 1.3 mg/dL 0.6   Alkaline Phosphatase Latest Ref Range: 40 - 150 U/L 99   ALT Latest Ref Range: 0 - 70 U/L 26   AST Latest Ref Range: 0 - 45 U/L 21   Glucose Latest Ref Range: 70 - 99 mg/dL 98   WBC Latest Ref Range: 4.0 - 11.0 10e9/L 3.0 (L)   Hemoglobin Latest Ref Range: 13.3 - 17.7 g/dL 12.7 (L)   Hematocrit Latest Ref Range: 40.0 - 53.0 % 37.6 (L)   Platelet Count Latest Ref Range: 150 - 450 10e9/L 85 (L)   RBC Count Latest Ref Range: 4.4 - 5.9 10e12/L 4.30 (L)   MCV Latest Ref Range: 78 - 100 fl 87   MCH Latest Ref Range: 26.5 - 33.0 pg 29.5   MCHC Latest Ref Range: 31.5 - 36.5 g/dL 33.8   RDW Latest Ref Range: 10.0 - 15.0 % 16.0 (H)   Diff Method Unknown Automated Method   % Neutrophils Latest Units: % 56.6   % Lymphocytes Latest Units: % 30.8   % Monocytes Latest Units: % 10.9   % Eosinophils Latest Units: % 0.7   % Basophils Latest Units: % 0.7   % Immature Granulocytes Latest Units: % 0.3   Nucleated RBCs Latest Ref Range: 0 /100 0   Absolute Neutrophil Latest Ref Range: 1.6 - 8.3 10e9/L 1.7   Absolute Lymphocytes Latest Ref Range: 0.8 - 5.3 10e9/L 0.9   Absolute Monocytes Latest Ref Range: 0.0 - 1.3 10e9/L 0.3   Absolute Eosinophils Latest Ref Range: 0.0 - 0.7 10e9/L 0.0   Absolute Basophils Latest Ref Range: 0.0 - 0.2 10e9/L 0.0   Abs Immature Granulocytes Latest Ref Range: 0 - 0.4 10e9/L 0.0   Absolute Nucleated RBC Unknown 0.0        ASSESSMENT/PLAN: 56 year old male with metastatic esophageal cancer    ONC- s/p 3 cycles of FOLFOX- tolerating much better with addition of antiemetics. Has improvement in dysphagia and chest pain  - labs OK for C4. Plts trending down  - CT CAP and DR Dee after two months (scheduled 7/10)    GI- pre-chemo symptoms of food stick/dysphagia are improved.  Had nausea/vomiting Days 1-6 and some dyspepsia  -Add emend/aloxi  -Zofran days 3-6  -ativan qhs  -Tums, small meals throughout day for dyspepsia      Neuro- cold neuropathy and baseline neuropathy. Stable. Monitor    ENT: sore mouth, no oral sores  -salt/soda swishes    FEN: weight back up 6-7 lbs . 64 oz hydration daily     Cards: H/o paroxysmal A fib. Declined anticoagulation. NSR today    Psych: Insomnia. Ambien prn    Over 50% of 25 min visit was spent counseling and coordinating care    Paula Rodriguez PA-C

## 2017-06-27 NOTE — MR AVS SNAPSHOT
After Visit Summary   6/27/2017    Reuben Padilla    MRN: [id number]           Patient Information     Date Of Birth          1960        Visit Information        Provider Department      6/27/2017 9:30 AM  27 ATC;  ONCOLOGY INFUSION [facility]        Today's Diagnoses     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)    -  1      Care Instructions    Contact Numbers    [facility] [place]: [phone]  [facility] [place]:  [phone]    Call triage with chills and/or temperature greater than or equal to 100.5, uncontrolled nausea/vomiting, diarrhea, constipation, dizziness, shortness of breath, chest pain, bleeding, unexplained bruising, or any new/concerning symptoms, questions/concerns.     If you are having any concerning symptoms or wish to speak to a provider before your next infusion visit, please call your care coordinator or triage to notify them so we can adequately serve you.       After Hours: [phone]    If after hours, weekends, or holidays, call main hospital  and ask for Oncology doctor on call.         June 2017 Sunday Monday Tuesday Wednesday Thursday Friday Saturday                       1     2     3       4     5     6     7     8     9     10       11     12     [facility]    7:30 AM   (15 min.)    [facility]   [facility] Lab Draw     P ONC INFUSION 240    8:00 AM   (240 min.)    ONCOLOGY INFUSION   [facility] 13     14     15     16     17       18     19     20     21     22     23     24       25     26     27     [facility]    8:30 AM   (15 min.)    [facility] D[facility]   [facility] Draw     [facility] RETURN    8:45 AM   (60 min.)   Paula Rodriguez PA-C   [facility]     [facility] ONC INFUSION 240    9:30 AM   (240 min.)    ONCOLOGY INFUSION   [facility] 28 29 30 July 2017 Sunday Monday Tuesday Wednesday Thursday Friday  Saturday                                 1       2     3     4     5     6     7     8       9     10     [facility]    9:00 AM   (15 min.)    [facility] DRAW   [facility] Lab Draw     CT CHEST/ABDOMEN/PELVIS W    9:25 AM   (20 min.)   UCCT1   [facility] CT     [facility] RETURN   12:15 PM   (30 min.)   Kadi Dee MD   [facility]     UM ONC INFUSION 240    1:00 PM   (240 min.)    ONCOLOGY INFUSION   [facility] 11     12     13     14     15       16     17     18     19     20     21     22       23     24     25     26     27     28     29       30     31                                           Lab Results:  Recent Results (from the past 12 hour(s))   CBC with platelets differential    Collection Time: 06/27/17  8:41 AM   Result Value Ref Range    WBC 3.0 (L) 4.0 - 11.0 10e9/L    RBC Count 4.30 (L) 4.4 - 5.9 10e12/L    Hemoglobin 12.7 (L) 13.3 - 17.7 g/dL    Hematocrit 37.6 (L) 40.0 - 53.0 %    MCV 87 78 - 100 fl    MCH 29.5 26.5 - 33.0 pg    MCHC 33.8 31.5 - 36.5 g/dL    RDW 16.0 (H) 10.0 - 15.0 %    Platelet Count 85 (L) 150 - 450 10e9/L    Diff Method Automated Method     % Neutrophils 56.6 %    % Lymphocytes 30.8 %    % Monocytes 10.9 %    % Eosinophils 0.7 %    % Basophils 0.7 %    % Immature Granulocytes 0.3 %    Nucleated RBCs 0 0 /100    Absolute Neutrophil 1.7 1.6 - 8.3 10e9/L    Absolute Lymphocytes 0.9 0.8 - 5.3 10e9/L    Absolute Monocytes 0.3 0.0 - 1.3 10e9/L    Absolute Eosinophils 0.0 0.0 - 0.7 10e9/L    Absolute Basophils 0.0 0.0 - 0.2 10e9/L    Abs Immature Granulocytes 0.0 0 - 0.4 10e9/L    Absolute Nucleated RBC 0.0    Comprehensive metabolic panel    Collection Time: 06/27/17  8:41 AM   Result Value Ref Range    Sodium 142 133 - 144 mmol/L    Potassium 4.0 3.4 - 5.3 mmol/L    Chloride 110 (H) 94 - 109 mmol/L    Carbon Dioxide 26 20 - 32 mmol/L    Anion Gap 6 3 - 14 mmol/L    Glucose 98 70 - 99 mg/dL    Urea Nitrogen  11 7 - 30 mg/dL    Creatinine 0.77 0.66 - 1.25 mg/dL    GFR Estimate >90  Non  GFR Calc   >60 mL/min/1.7m2    GFR Estimate If Black >90   GFR Calc   >60 mL/min/1.7m2    Calcium 8.8 8.5 - 10.1 mg/dL    Bilirubin Total 0.6 0.2 - 1.3 mg/dL    Albumin 3.2 (L) 3.4 - 5.0 g/dL    Protein Total 6.8 6.8 - 8.8 g/dL    Alkaline Phosphatase 99 40 - 150 U/L    ALT 26 0 - 70 U/L    AST 21 0 - 45 U/L               Follow-ups after your visit        Your next 10 appointments already scheduled     Jul 10, 2017  9:00 AM CDT   [facility] with  [facility]   [facility] ([facility])    [address] [place] [id number] [phone]            Jul 10, 2017  9:40 AM CDT   (Arrive by 9:25 AM)   CT CHEST/ABDOMEN/PELVIS W CONTRAST with UCCT1   [facility] CT ([facility])    [address] [place] [id number] [phone]           Please bring any scans or X-rays taken at other hospitals, if similar tests were done. Also bring a list of your medicines, including vitamins, minerals and over-the-counter drugs. It is safest to leave personal items at home.  Be sure to tell your doctor:   If you have any allergies.   If there s any chance you are pregnant.   If you are breastfeeding.   If you have any special needs.  You may have contrast for this exam. To prepare:   Do not eat or drink for 2 hours before your exam. If you need to take medicine, you may take it with small sips of water. (We may ask you to take liquid medicine as well.)   The day before your exam, drink extra fluids at least six 8-ounce glasses (unless your doctor tells you to restrict your fluids).  Patients over 70 or patients with diabetes or kidney problems:   If you haven t had a blood test (creatinine test) within the last 30 days, go to your clinic or Diagnostic Imaging Department for this test.  If  you have diabetes:   If your kidney function is normal, continue taking your metformin (Avandamet, Glucophage, Glucovance, Metaglip) on the day of your exam.   If your kidney function is abnormal, wait 48 hours before restarting this medicine.  You will have oral contrast for this exam:   You will drink the contrast at home. Get this from your clinic or Diagnostic Imaging Department. Please follow the directions given.  Please wear loose clothing, such as a sweat suit or jogging clothes. Avoid snaps, zippers and other metal. We may ask you to undress and put on a hospital gown.  If you have any questions, please call the Imaging Department where you will have your exam.            Jul 10, 2017 12:30 PM CDT   (Arrive by 12:15 PM)   Return Visit with Kadi Dee MD   [facility] ([facility])    [address] [place] [id number] [phone]            Jul 10, 2017  1:00 PM CDT   Infusion 240 with  ONCOLOGY INFUSION, UC 10 ATC   [facility] ([facility])    [address] [place] [id number] [phone]              Who to contact     If you have questions or need follow up information about today's clinic visit or your schedule please contact [facility] directly at [phone].  Normal or non-critical lab and imaging results will be communicated to you by MyChart, letter or phone within 4 business days after the clinic has received the results. If you do not hear from us within 7 days, please contact the clinic through MyChart or phone. If you have a critical or abnormal lab result, we will notify you by phone as soon as possible.  Submit refill requests through [employer] or call your pharmacy and they will forward the refill request to us. Please allow 3 business days for your refill to be completed.          Additional Information About Your Visit         [employer] Information     [employer] gives you secure access to your electronic health record. If you see a primary care provider, you can also send messages to your care team and make appointments. If you have questions, please call your primary care clinic.  If you do not have a primary care provider, please call [phone] and they will assist you.        Care EveryWhere ID     This is your Care EveryWhere ID. This could be used by other organizations to access your [place] medical records  [id number]         Blood Pressure from Last 3 Encounters:   06/27/17 132/80   06/12/17 124/85   05/30/17 117/74    Weight from Last 3 Encounters:   06/27/17 (!) 166.9 kg (367 lb 14.4 oz)   06/12/17 (!) 163.9 kg (361 lb 6.4 oz)   05/30/17 (!) 165.8 kg (365 lb 9.6 oz)              We Performed the Following     CBC with platelets differential     Comprehensive metabolic panel          Today's Medication Changes          These changes are accurate as of: 6/27/17 10:09 AM.  If you have any questions, ask your nurse or doctor.               These medicines have changed or have updated prescriptions.        Dose/Directions    LORazepam 0.5 MG tablet   Commonly known as:  ATIVAN   This may have changed:  Another medication with the same name was removed. Continue taking this medication, and follow the directions you see here.   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 [facility] [phone].    ATENCIÓN: Si habla español, tiene a alamo disposición  servicios gratuitos de asistencia lingüística. Tanisha anderson [phone].    We comply with applicable federal civil rights laws and [place] laws. We do not discriminate on the basis of race, color, national origin, age, disability sex, sexual orientation or gender identity.            Thank you!     Thank you for choosing [facility]  for your care. Our goal is always to provide you with excellent care. Hearing back from our patients is one way we can continue to improve our services. Please take a few minutes to complete the written survey that you may receive in the mail after your visit with us. Thank you!             Your Updated Medication List - Protect others around you: Learn how to safely use, store and throw away your medicines at www.disposemymeds.org.          This list is accurate as of: 6/27/17 10:09 AM.  Always use your most recent med list.                   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)       LORazepam 0.5 MG tablet    ATIVAN    60 tablet    Take 1 tablet (0.5 mg) by mouth every 6 hours as needed for anxiety    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), 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       Multi-vitamin Tabs tablet      Take 1 tablet by mouth daily        ondansetron 8 MG tablet    ZOFRAN    30 tablet    Take 1 tablet (8 mg) by mouth every 8 hours as needed (Nausea/Vomiting)    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)       prochlorperazine 10 MG tablet    COMPAZINE    30 tablet    Take 1 tablet (10 mg)  by mouth every 6 hours as needed (Nausea/Vomiting)    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)       timolol 0.5 % ophthalmic solution    TIMOPTIC     Place into both eyes daily    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)       zolpidem 5 MG tablet    AMBIEN    60 tablet    Take 1-2 tablets each evening as needed    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), 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

## 2017-06-27 NOTE — PROGRESS NOTES
Infusion Nursing Note:  Reuben Padilla presents today for Cycle 4, day 1 Oxaliplatin, Leucovorin, Fluorouracil bolus and fluorouracil pump.    Patient seen by provider today: Yes: SERGIO Paige    Note: Patient presents to clinic today feeling well with no questions.      Intravenous Access:  Implanted Port.    Treatment Conditions:  Lab Results   Component Value Date    HGB 12.7 06/27/2017     Lab Results   Component Value Date    WBC 3.0 06/27/2017      Lab Results   Component Value Date    ANEU 1.7 06/27/2017     Lab Results   Component Value Date    PLT 85 06/27/2017      Lab Results   Component Value Date     06/27/2017                   Lab Results   Component Value Date    POTASSIUM 4.0 06/27/2017           No results found for: MAG         Lab Results   Component Value Date    CR 0.77 06/27/2017                   Lab Results   Component Value Date    NIDHI 8.8 06/27/2017                Lab Results   Component Value Date    BILITOTAL 0.6 06/27/2017           Lab Results   Component Value Date    ALBUMIN 3.2 06/27/2017                    Lab Results   Component Value Date    ALT 26 06/27/2017           Lab Results   Component Value Date    AST 21 06/27/2017     Results reviewed, labs MET treatment parameters, ok to proceed with treatment.    Post Infusion Assessment:  Patient tolerated infusion without incident.  Blood return noted pre and post infusion.  Site patent and intact, free from redness, edema or discomfort.  No evidence of extravasations.    Fluorouracil pump connected per protocol.  Connections taped, temperature sensor taped to skin.  Pump to infuse at 5.2ml/hr for 46 hours.  Disconnect time of 1030 on 6/30/2017 at [facility] called to [facility].  Spoke with Jose.    Discharge Plan:   Patient declined prescription refills.  Discharge instructions reviewed with: Patient.  Patient and/or family verbalized understanding of discharge instructions and all questions answered.  Copy  of AVS reviewed with patient and/or family.  Patient will return 7/10/2017 for next appointment.  Departure Mode: Ambulatory.    Juliet Gama RN

## 2017-06-27 NOTE — NURSING NOTE
Chief Complaint   Patient presents with     Port Draw     port accessed with 20 gauge 3/4 inch power needle, line flushed with NS and heparin. vitals taken      Valentina Gamez RN

## 2017-06-27 NOTE — PATIENT INSTRUCTIONS
Contact Numbers    [facility] Main Line: [phone]  [facility] Triage:  [phone]    Call triage with chills and/or temperature greater than or equal to 100.5, uncontrolled nausea/vomiting, diarrhea, constipation, dizziness, shortness of breath, chest pain, bleeding, unexplained bruising, or any new/concerning symptoms, questions/concerns.     If you are having any concerning symptoms or wish to speak to a provider before your next infusion visit, please call your care coordinator or triage to notify them so we can adequately serve you.       After Hours: [phone]    If after hours, weekends, or holidays, call main hospital  and ask for Oncology doctor on call.         June 2017 Sunday Monday Tuesday Wednesday Thursday Friday Saturday                       1     2     3       4     5     6     7     8     9     10       11     12     [facility]    7:30 AM   (15 min.)    [facility] DRAW   [facility] Lab Draw     [facility] ONC INFUSION 240    8:00 AM   (240 min.)    ONCOLOGY INFUSION   [facility] 13     14     15     16     17       18     19     20     21     22     23     24       25     26     27     [facility] DRAW    8:30 AM   (15 min.)    [facility] DRAW   [facility] Lab Draw     [facility] RETURN    8:45 AM   (60 min.)   Paula Rodriguez PA-C   [facility] ONC INFUSION 240    9:30 AM   (240 min.)    ONCOLOGY INFUSION   [facility] 28     29 30 July 2017 Sunday Monday Tuesday Wednesday Thursday Friday Saturday                                 1       2     3     4     5     6     7     8       9     10     [facility]    9:00 AM   (15 min.)    [facility] DRAW   [facility] Lab Draw     CT CHEST/ABDOMEN/PELVIS W    9:25 AM   (20 min.)   UCCT1   [facility] CT     [facility] RETURN   12:15 PM   (30 min.)   Kadi Dee MD   [facility] ONC  INFUSION 240    1:00 PM   (240 min.)    ONCOLOGY INFUSION   [facility] 11     12     13     14     15       16     17     18     19     20     21     22       23     24     25     26     27     28     29       30     31                                           Lab Results:  Recent Results (from the past 12 hour(s))   CBC with platelets differential    Collection Time: 06/27/17  8:41 AM   Result Value Ref Range    WBC 3.0 (L) 4.0 - 11.0 10e9/L    RBC Count 4.30 (L) 4.4 - 5.9 10e12/L    Hemoglobin 12.7 (L) 13.3 - 17.7 g/dL    Hematocrit 37.6 (L) 40.0 - 53.0 %    MCV 87 78 - 100 fl    MCH 29.5 26.5 - 33.0 pg    MCHC 33.8 31.5 - 36.5 g/dL    RDW 16.0 (H) 10.0 - 15.0 %    Platelet Count 85 (L) 150 - 450 10e9/L    Diff Method Automated Method     % Neutrophils 56.6 %    % Lymphocytes 30.8 %    % Monocytes 10.9 %    % Eosinophils 0.7 %    % Basophils 0.7 %    % Immature Granulocytes 0.3 %    Nucleated RBCs 0 0 /100    Absolute Neutrophil 1.7 1.6 - 8.3 10e9/L    Absolute Lymphocytes 0.9 0.8 - 5.3 10e9/L    Absolute Monocytes 0.3 0.0 - 1.3 10e9/L    Absolute Eosinophils 0.0 0.0 - 0.7 10e9/L    Absolute Basophils 0.0 0.0 - 0.2 10e9/L    Abs Immature Granulocytes 0.0 0 - 0.4 10e9/L    Absolute Nucleated RBC 0.0    Comprehensive metabolic panel    Collection Time: 06/27/17  8:41 AM   Result Value Ref Range    Sodium 142 133 - 144 mmol/L    Potassium 4.0 3.4 - 5.3 mmol/L    Chloride 110 (H) 94 - 109 mmol/L    Carbon Dioxide 26 20 - 32 mmol/L    Anion Gap 6 3 - 14 mmol/L    Glucose 98 70 - 99 mg/dL    Urea Nitrogen 11 7 - 30 mg/dL    Creatinine 0.77 0.66 - 1.25 mg/dL    GFR Estimate >90  Non  GFR Calc   >60 mL/min/1.7m2    GFR Estimate If Black >90   GFR Calc   >60 mL/min/1.7m2    Calcium 8.8 8.5 - 10.1 mg/dL    Bilirubin Total 0.6 0.2 - 1.3 mg/dL    Albumin 3.2 (L) 3.4 - 5.0 g/dL    Protein Total 6.8 6.8 - 8.8 g/dL    Alkaline Phosphatase 99 40 - 150 U/L    ALT 26 0 - 70 U/L     AST 21 0 - 45 U/L

## 2017-06-27 NOTE — MR AVS SNAPSHOT
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 So this cycle start zofran 3 times a day Friday through Monday. In the meantime you can take compazine and/or ativan.     Carry imodium in the truck in case of diarrhea    Senna as needed for constipation.             Follow-ups after your visit        Your next 10 appointments already scheduled     Jul 10, 2017  9:00 AM CDT   [facility] with  [facility]   [facility] ([facility])    [address] [place] [id number] [phone]            Jul 10, 2017  9:40 AM CDT   (Arrive by 9:25 AM)   CT CHEST/ABDOMEN/PELVIS W CONTRAST with UCCT1   [facility] ([facility])    [address] [place] [id number] [phone]           Please bring any scans or X-rays taken at other hospitals, if similar tests were done. Also bring a list of your medicines, including vitamins, minerals and over-the-counter drugs. It is safest to leave personal items at home.  Be sure to tell your doctor:   If you have any allergies.   If there s any chance you are pregnant.   If you are breastfeeding.   If you have any special needs.  You may have contrast for this exam. To prepare:   Do not eat or drink for 2 hours before your exam. If you need to take medicine, you may take it with small sips of water. (We may ask you to take liquid medicine as well.)   The day before your exam, drink extra fluids at least  six 8-ounce glasses (unless your doctor tells you to restrict your fluids).  Patients over 70 or patients with diabetes or kidney problems:   If you haven t had a blood test (creatinine test) within the last 30 days, go to your clinic or Diagnostic Imaging Department for this test.  If you have diabetes:   If your kidney function is normal, continue taking your metformin (Avandamet, Glucophage, Glucovance, Metaglip) on the day of your exam.   If your kidney function is abnormal, wait 48 hours before restarting this medicine.  You will have oral contrast for this exam:   You will drink the contrast at home. Get this from your clinic or Diagnostic Imaging Department. Please follow the directions given.  Please wear loose clothing, such as a sweat suit or jogging clothes. Avoid snaps, zippers and other metal. We may ask you to undress and put on a hospital gown.  If you have any questions, please call the Imaging Department where you will have your exam.            Jul 10, 2017 12:30 PM CDT   (Arrive by 12:15 PM)   Return Visit with Kadi Dee MD   [facility] ([facility])    [address] [place] [id number] [phone]            Jul 10, 2017  1:00 PM CDT   Infusion 240 with  ONCOLOGY INFUSION,  10 ATC   [facility] ([facility])    [address] [place] [id number] [phone]              Who to contact     If you have questions or need follow up information about today's clinic visit or your schedule please contact [facility] directly at [phone].  Normal or non-critical lab and imaging results will be communicated to you by MyChart, letter or phone within 4 business days after the clinic has received the results. If you do not hear from us within 7 days, please contact the clinic through MyChart or phone. If you have a critical or  "abnormal lab result, we will notify you by phone as soon as possible.  Submit refill requests through [employer] or call your pharmacy and they will forward the refill request to us. Please allow 3 business days for your refill to be completed.          Additional Information About Your Visit        [employer] Information     [employer] gives you secure access to your electronic health record. If you see a primary care provider, you can also send messages to your care team and make appointments. If you have questions, please call your primary care clinic.  If you do not have a primary care provider, please call [phone] and they will assist you.        Care EveryWhere ID     This is your Care EveryWhere ID. This could be used by other organizations to access your [place] medical records  [id number]        Your Vitals Were     Pulse Temperature Respirations Height Pulse Oximetry BMI (Body Mass Index)    53 98.2  F (36.8  C) 16 1.981 m (6' 5.99\") 97% 42.52 kg/m2       Blood Pressure from Last 3 Encounters:   06/27/17 132/80   06/12/17 124/85   05/30/17 117/74    Weight from Last 3 Encounters:   06/27/17 (!) 166.9 kg (367 lb 14.4 oz)   06/12/17 (!) 163.9 kg (361 lb 6.4 oz)   05/30/17 (!) 165.8 kg (365 lb 9.6 oz)              Today, you had the following     No orders found for display         Today's Medication Changes          These changes are accurate as of: 6/27/17  9:58 AM.  If you have any questions, ask your nurse or doctor.               These medicines have changed or have updated prescriptions.        Dose/Directions    LORazepam 0.5 MG tablet   Commonly known as:  ATIVAN   This may have changed:  Another medication with the same name was removed. Continue taking this medication, and follow the directions you see here.   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 So [facility] [phone].    ATENCIÓN: Si habla español, tiene a alamo disposición servicios gratuitos de asistencia lingüística. Llame al [phone].    We comply with applicable federal civil rights laws and [place] laws. We do not discriminate on the basis of race, color, national origin, age, disability sex, sexual orientation or gender identity.            Thank you!     Thank you for choosing [facility]  for your care. Our goal is always to provide you with excellent care. Hearing back from our patients is one way we can continue to improve our services. Please take a few minutes to complete the written survey that you may receive in the mail after your visit with us. Thank you!             Your Updated Medication List - Protect others around you: Learn how to safely use, store and throw away your medicines at www.disposemymeds.org.          This list is accurate as of: 6/27/17  9:58 AM.  Always use your most recent med list.                   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)       LORazepam 0.5 MG tablet    ATIVAN    60 tablet    Take 1 tablet (0.5 mg) by mouth every 6 hours as needed for anxiety    Cancer  of distal third of esophagus (H), 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       Multi-vitamin Tabs tablet      Take 1 tablet by mouth daily        ondansetron 8 MG tablet    ZOFRAN    30 tablet    Take 1 tablet (8 mg) by mouth every 8 hours as needed (Nausea/Vomiting)    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)       prochlorperazine 10 MG tablet    COMPAZINE    30 tablet    Take 1 tablet (10 mg) by mouth every 6 hours as needed (Nausea/Vomiting)    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)       timolol 0.5 % ophthalmic solution    TIMOPTIC     Place into both eyes daily    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)       zolpidem 5 MG tablet    AMBIEN    60 tablet    Take 1-2 tablets each evening as needed    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), 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

## 2017-06-27 NOTE — PROGRESS NOTES
Completed Doctor's certification for [facility] [place],  Emailed form to patient and to email listed on form as requested on 6/27//17

## 2017-06-27 NOTE — NURSING NOTE
"Oncology Rooming Note    June 27, 2017 8:53 AM   Reuben Padilla is a 56 year old male who presents for:    Chief Complaint   Patient presents with     Port Draw     port accessed with 20 gauge 3/4 inch power needle, line flushed with NS and heparin. vitals taken      Oncology Clinic Visit     Return: Esophageal Ca     Initial Vitals: Pulse 53  Temp 98.2  F (36.8  C)  Resp 16  Wt (!) 166.9 kg (367 lb 14.4 oz)  SpO2 97%  BMI 42.52 kg/m2 Estimated body mass index is 42.52 kg/(m^2) as calculated from the following:    Height as of 5/9/17: 1.981 m (6' 6\").    Weight as of this encounter: 166.9 kg (367 lb 14.4 oz). Body surface area is 3.03 meters squared.  No Pain (0) Comment: Data Unavailable   No LMP for male patient.  Allergies reviewed: Yes  Medications reviewed: Yes    Medications: Medication refills not needed today.  Pharmacy name entered into [employer]: [id number] - [facility] - [address] [place] [address] AT [address] & [facility]    Clinical concerns: no new concerns    6 minutes for nursing intake (face to face time)     Gianna Nolan CMA              "

## 2017-06-27 NOTE — PROGRESS NOTES
[facility]  FOLLOW-UP VISIT NOTE  Date of visit: 5-30-17        REASON FOR VISIT: metastatic esophageal CA, here for C4 of FOLFOX    HPI: Reuben reports that initially he noticed symptoms of dysphagia, mainly to solid food, starting in February of 2017.  Symptoms got progressively worse.  He was unable to eat solid food, and he needs to take a lot of fluid to push food down.  His dysphagia symptoms prompted subsequent evaluation with upper endoscopy and colonoscopy that was done in [place]. The colonoscopy revealed two tubular adenomas in the colon.  The upper endoscopy revealed an ulcerated mass 1-2 cm long, approximately 40 cm from the lips.  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       The patient was seen by Dr. Clemencia Carreon initially who ordered an ENT evaluation and supraclavicular lymph node FNA to rule out head and neck etiology of the cancer, since outside pathology was reported as SCC. The pathology slides were requested from [facility] for review by the [facility] Pathology Department. Our diagnosis is invasive  poorly differentiated adenocarcinoma.  The FNA of the supraclavicular LN was done that confirmed poorly differentiated adenocarcinoma of GI origin.  The tumor was P63 negative and CK7 negative.  Positive for cytokeratin 20.  The patient also had a PET/CT scan done that revealed widespread lavon metastasis and multiple liver mets that were consistent with metastatic disease. HER2 is negative.     Reuben started FOLFOX on 5/15/17 and presents today for C4.     INTERVAL HISTORY: Reuben is doing well. Side effects are better managed now with Aloxi, emend and scheduled zofran days 3-6 (although he was starting the zofran day 1). He is eating better and has re-gained some weight. Otherwise he notes some fatigue and cold sensitivity. He says only one day he can eat ice  "cream. He has some baseline neuropathy in the feet which hasn't worsened with chemo. 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 He has family here. He is able to work ok the second week of chemo. No mucositis. No fevers, chills, headaches. His dysphagia has improved with chemo, he can eat almost everything now although he avoids bread. Also, esophogeal pain has all but completely resolved. He doesn't take anything for this. He has constipation followed by diarrhea which he manages with senna then  Imodium. He has a tough time sleeping as he processes his illness at night. He takes 2 ambien and still struggles. He finds his time at [employer] helpful as he talks to other people with similar health situations. Otherwise 10 pt ROS negative.       EXAM:  /80  Pulse 53  Temp 98.2  F (36.8  C)  Resp 16  Ht 1.981 m (6' 5.99\")  Wt (!) 166.9 kg (367 lb 14.4 oz)  SpO2 97%  BMI 42.52 kg/m2  Wt Readings from Last 4 Encounters:   06/27/17 (!) 166.9 kg (367 lb 14.4 oz)   06/12/17 (!) 163.9 kg (361 lb 6.4 oz)   05/30/17 (!) 165.8 kg (365 lb 9.6 oz)   05/15/17 (!) 165.5 kg (364 lb 14.4 oz)     Vital signs were reviewed.   Patient alert and oriented x3.   PERRLA. EOMI. No scleral icterus noted. OP without thrush/sores.  Neck exam: No palpable cervical, supraclavicular or axillary nodes bilaterally.   Heart: RRR no murmurs noted. I manually took his pulse, it was 70bpm and regular  Lungs: clear to auscultation bilaterally.  No crackles or wheezing.   Abd: obese, deferred  Extremities: No lower extremity edema.   Neuro: grossly intact.   Mood and affect is stable.       LABS:   Results for KORI CHANEY (MRN [id number]) as of 6/27/2017 09:47   Ref. Range 6/27/2017 08:41   Sodium Latest Ref Range: 133 - 144 mmol/L 142   Potassium Latest Ref Range: 3.4 - 5.3 mmol/L 4.0   Chloride Latest Ref Range: 94 - 109 mmol/L 110 (H)   Carbon " Dioxide Latest Ref Range: 20 - 32 mmol/L 26   Urea Nitrogen Latest Ref Range: 7 - 30 mg/dL 11   Creatinine Latest Ref Range: 0.66 - 1.25 mg/dL 0.77   GFR Estimate Latest Ref Range: >60 mL/min/1.7m2 >90...   GFR Estimate If Black Latest Ref Range: >60 mL/min/1.7m2 >90...   Calcium Latest Ref Range: 8.5 - 10.1 mg/dL 8.8   Anion Gap Latest Ref Range: 3 - 14 mmol/L 6   Albumin Latest Ref Range: 3.4 - 5.0 g/dL 3.2 (L)   Protein Total Latest Ref Range: 6.8 - 8.8 g/dL 6.8   Bilirubin Total Latest Ref Range: 0.2 - 1.3 mg/dL 0.6   Alkaline Phosphatase Latest Ref Range: 40 - 150 U/L 99   ALT Latest Ref Range: 0 - 70 U/L 26   AST Latest Ref Range: 0 - 45 U/L 21   Glucose Latest Ref Range: 70 - 99 mg/dL 98   WBC Latest Ref Range: 4.0 - 11.0 10e9/L 3.0 (L)   Hemoglobin Latest Ref Range: 13.3 - 17.7 g/dL 12.7 (L)   Hematocrit Latest Ref Range: 40.0 - 53.0 % 37.6 (L)   Platelet Count Latest Ref Range: 150 - 450 10e9/L 85 (L)   RBC Count Latest Ref Range: 4.4 - 5.9 10e12/L 4.30 (L)   MCV Latest Ref Range: 78 - 100 fl 87   MCH Latest Ref Range: 26.5 - 33.0 pg 29.5   MCHC Latest Ref Range: 31.5 - 36.5 g/dL 33.8   RDW Latest Ref Range: 10.0 - 15.0 % 16.0 (H)   Diff Method Unknown Automated Method   % Neutrophils Latest Units: % 56.6   % Lymphocytes Latest Units: % 30.8   % Monocytes Latest Units: % 10.9   % Eosinophils Latest Units: % 0.7   % Basophils Latest Units: % 0.7   % Immature Granulocytes Latest Units: % 0.3   Nucleated RBCs Latest Ref Range: 0 /100 0   Absolute Neutrophil Latest Ref Range: 1.6 - 8.3 10e9/L 1.7   Absolute Lymphocytes Latest Ref Range: 0.8 - 5.3 10e9/L 0.9   Absolute Monocytes Latest Ref Range: 0.0 - 1.3 10e9/L 0.3   Absolute Eosinophils Latest Ref Range: 0.0 - 0.7 10e9/L 0.0   Absolute Basophils Latest Ref Range: 0.0 - 0.2 10e9/L 0.0   Abs Immature Granulocytes Latest Ref Range: 0 - 0.4 10e9/L 0.0   Absolute Nucleated RBC Unknown 0.0       ASSESSMENT/PLAN: 56 year old male with metastatic esophageal  cancer    ONC- s/p 3 cycles of FOLFOX- tolerating much better with addition of antiemetics. Has improvement in dysphagia and chest pain  - labs OK for C4. Plts trending down  - CT CAP and DR Dee after two months (scheduled 7/10)    GI- pre-chemo symptoms of food stick/dysphagia are improved.  Had nausea/vomiting Days 1-6 and some dyspepsia  -Add emend/aloxi  -Zofran days 3-6  -ativan qhs  -Tums, small meals throughout day for dyspepsia      Neuro- cold neuropathy and baseline neuropathy. Stable. Monitor    ENT: sore mouth, no oral sores  -salt/soda swishes    FEN: weight back up 6-7 lbs . 64 oz hydration daily     Cards: H/o paroxysmal A fib. Declined anticoagulation. NSR today    Psych: Insomnia. Ambien prn    Over 50% of 25 min visit was spent counseling and coordinating care    Paula Rodriguez PA-C

## 2017-06-28 ENCOUNTER — ONCOLOGY VISIT (OUTPATIENT)
Dept: ONCOLOGY | Facility: CLINIC | Age: 57
End: 2017-06-28
Attending: PHYSICIAN ASSISTANT
Payer: COMMERCIAL

## 2017-06-28 ENCOUNTER — INFUSION THERAPY VISIT (OUTPATIENT)
Dept: ONCOLOGY | Facility: CLINIC | Age: 57
End: 2017-06-28
Attending: INTERNAL MEDICINE
Payer: COMMERCIAL

## 2017-06-28 ENCOUNTER — HOME INFUSION (PRE-WILLOW HOME INFUSION) (OUTPATIENT)
Dept: PHARMACY | Facility: CLINIC | Age: 57
End: 2017-06-28

## 2017-06-28 VITALS
HEART RATE: 87 BPM | TEMPERATURE: 97.5 F | OXYGEN SATURATION: 97 % | SYSTOLIC BLOOD PRESSURE: 124 MMHG | DIASTOLIC BLOOD PRESSURE: 79 MMHG | RESPIRATION RATE: 18 BRPM

## 2017-06-28 DIAGNOSIS — I48.0 PAROXYSMAL ATRIAL FIBRILLATION (H): Primary | ICD-10-CM

## 2017-06-28 DIAGNOSIS — C15.5 CANCER OF DISTAL THIRD OF ESOPHAGUS (H): Primary | ICD-10-CM

## 2017-06-28 LAB
ALBUMIN SERPL-MCNC: 3.1 G/DL (ref 3.4–5)
ALP SERPL-CCNC: 95 U/L (ref 40–150)
ALT SERPL W P-5'-P-CCNC: 29 U/L (ref 0–70)
ANION GAP SERPL CALCULATED.3IONS-SCNC: 8 MMOL/L (ref 3–14)
AST SERPL W P-5'-P-CCNC: 24 U/L (ref 0–45)
BASOPHILS # BLD AUTO: 0 10E9/L (ref 0–0.2)
BASOPHILS NFR BLD AUTO: 0 %
BILIRUB SERPL-MCNC: 0.4 MG/DL (ref 0.2–1.3)
BUN SERPL-MCNC: 11 MG/DL (ref 7–30)
CALCIUM SERPL-MCNC: 8.9 MG/DL (ref 8.5–10.1)
CHLORIDE SERPL-SCNC: 108 MMOL/L (ref 94–109)
CO2 SERPL-SCNC: 25 MMOL/L (ref 20–32)
CREAT SERPL-MCNC: 0.7 MG/DL (ref 0.66–1.25)
DIFFERENTIAL METHOD BLD: ABNORMAL
EOSINOPHIL # BLD AUTO: 0 10E9/L (ref 0–0.7)
EOSINOPHIL NFR BLD AUTO: 0 %
ERYTHROCYTE [DISTWIDTH] IN BLOOD BY AUTOMATED COUNT: 16.2 % (ref 10–15)
GFR SERPL CREATININE-BSD FRML MDRD: ABNORMAL ML/MIN/1.7M2
GLUCOSE SERPL-MCNC: 143 MG/DL (ref 70–99)
HCT VFR BLD AUTO: 39.6 % (ref 40–53)
HGB BLD-MCNC: 13.4 G/DL (ref 13.3–17.7)
IMM GRANULOCYTES # BLD: 0 10E9/L (ref 0–0.4)
IMM GRANULOCYTES NFR BLD: 0.3 %
LYMPHOCYTES # BLD AUTO: 0.5 10E9/L (ref 0.8–5.3)
LYMPHOCYTES NFR BLD AUTO: 7.2 %
MCH RBC QN AUTO: 29.5 PG (ref 26.5–33)
MCHC RBC AUTO-ENTMCNC: 33.8 G/DL (ref 31.5–36.5)
MCV RBC AUTO: 87 FL (ref 78–100)
MONOCYTES # BLD AUTO: 0.4 10E9/L (ref 0–1.3)
MONOCYTES NFR BLD AUTO: 6.1 %
NEUTROPHILS # BLD AUTO: 5.4 10E9/L (ref 1.6–8.3)
NEUTROPHILS NFR BLD AUTO: 86.4 %
NRBC # BLD AUTO: 0 10*3/UL
NRBC BLD AUTO-RTO: 0 /100
PLATELET # BLD AUTO: 100 10E9/L (ref 150–450)
POTASSIUM SERPL-SCNC: 4 MMOL/L (ref 3.4–5.3)
PROT SERPL-MCNC: 6.9 G/DL (ref 6.8–8.8)
RBC # BLD AUTO: 4.55 10E12/L (ref 4.4–5.9)
SODIUM SERPL-SCNC: 142 MMOL/L (ref 133–144)
WBC # BLD AUTO: 6.3 10E9/L (ref 4–11)

## 2017-06-28 PROCEDURE — 99215 OFFICE O/P EST HI 40 MIN: CPT | Mod: 25

## 2017-06-28 PROCEDURE — 36415 COLL VENOUS BLD VENIPUNCTURE: CPT | Performed by: INTERNAL MEDICINE

## 2017-06-28 PROCEDURE — 99214 OFFICE O/P EST MOD 30 MIN: CPT | Mod: ZP | Performed by: PHYSICIAN ASSISTANT

## 2017-06-28 PROCEDURE — 25000128 H RX IP 250 OP 636: Mod: ZF | Performed by: INTERNAL MEDICINE

## 2017-06-28 PROCEDURE — 96360 HYDRATION IV INFUSION INIT: CPT

## 2017-06-28 PROCEDURE — 96361 HYDRATE IV INFUSION ADD-ON: CPT

## 2017-06-28 PROCEDURE — 93005 ELECTROCARDIOGRAM TRACING: CPT

## 2017-06-28 PROCEDURE — 80053 COMPREHEN METABOLIC PANEL: CPT | Performed by: INTERNAL MEDICINE

## 2017-06-28 PROCEDURE — 85025 COMPLETE CBC W/AUTO DIFF WBC: CPT | Performed by: INTERNAL MEDICINE

## 2017-06-28 RX ADMIN — SODIUM CHLORIDE 1000 ML: 9 INJECTION, SOLUTION INTRAVENOUS at 10:00

## 2017-06-28 NOTE — PROGRESS NOTES
"Infusion Nursing Note:  Reuben Padilla presents today for IVF.    Patient seen by provider today: Yes: SERGIO Atkins    Note: Patient walked into clinic worried about him pump.  Upon assessment, 5-FU appears to be infusing correctly, though temp sensor needed to be reattached.  Patient states this morning he started feeling very lightheaded and \"slow\", feeling like his \"knees were buckling\" and some SOB with any movement.  Patient states he has a history of \"lone A fib\".  Denies CP, fevers/chills, falls, unilateral weakness/numbness, palpitations, changes to medications overnight.  BP=80s/50s.  HR irregular 60s.    TORB 6/28/2017 0942 Dr. Dee/TAYLOR Gama, RN:  -Administer 1L NS IV  -Draw CMP and CMC  -Obtain EKG  -PA should assess, notify MD if unable    PA to bedside; 5 FU stopped for IVF.  5FU stopped approximately 6168-7889.     TORB 6/28/2017 1105 SERGIO Badillo/TAYLOR Gama, RN:  -Okay to administer up to 2L today; patient received approximately 1500cc  -Okay to restart 5 FU pump and d/c if BP remains stable and able to ambulate comfortably  -Return to clinic for pump d/c and possible IVF tomorrow--scheduled for 1200, cancelled [facility] disconnect at [facility]    Patient ambulated independently and felt \"a lot better\" twice prior to discharge.  BP maintained after ambulation and patient verbalized he felt steady.  Instructed patient to change positions slowly and call triage if symptoms return or observes any new symptoms.  Patient verbalized understanding.     Intravenous Access:  Implanted Port.    Treatment Conditions:  Lab Results   Component Value Date    HGB 13.4 06/28/2017     Lab Results   Component Value Date    WBC 6.3 06/28/2017      Lab Results   Component Value Date    ANEU 5.4 06/28/2017     Lab Results   Component Value Date     06/28/2017      Lab Results   Component Value Date     06/28/2017                   Lab Results   Component Value Date    POTASSIUM 4.0 06/28/2017 "           No results found for: MAG         Lab Results   Component Value Date    CR 0.70 06/28/2017                   Lab Results   Component Value Date    NIDHI 8.9 06/28/2017                Lab Results   Component Value Date    BILITOTAL 0.4 06/28/2017           Lab Results   Component Value Date    ALBUMIN 3.1 06/28/2017                    Lab Results   Component Value Date    ALT 29 06/28/2017           Lab Results   Component Value Date    AST 24 06/28/2017     Post Infusion Assessment:  Patient tolerated infusion without incident.  Blood return noted pre and post infusion.  Site patent and intact, free from redness, edema or discomfort.  No evidence of extravasations.    Discharge Plan:   Patient declined prescription refills.  Discharge instructions reviewed with: Patient.  Patient and/or family verbalized understanding of discharge instructions and all questions answered.  Copy of AVS reviewed with patient and/or family.  Patient will return 6/29/2017 for next appointment.  Departure Mode: Ambulatory.  Face to Face time: 20 minutes.    Juliet Gama RN

## 2017-06-28 NOTE — MR AVS SNAPSHOT
After Visit Summary   6/28/2017    Reuben Padilla    MRN: [id number]           Patient Information     Date Of Birth          1960        Visit Information        Provider Department      6/28/2017 9:40 AM Katalina Ramirez PA-C [facility]        Today's Diagnoses     Paroxysmal atrial fibrillation (H)    -  1       Follow-ups after your visit        Additional Services     CARDIOLOGY EVAL ADULT REFERRAL       Your provider has referred you to:  [facility]: [facility] [phone]   [email or web address]    Please be aware that coverage of these services is subject to the terms and limitations of your health insurance plan.  Call member services at your health plan with any benefit or coverage questions.      Type of Referral:  New Cardiology Consult    Timeframe requested:  Within 2 weeks    Please bring the following to your appointment:  >>   Any x-rays, CTs or MRIs which have been performed.  Contact the facility where they were done to arrange for  prior to your scheduled appointment.    >>   List of current medications  >>   This referral request   >>   Any documents/labs given to you for this referral                  Your next 10 appointments already scheduled     Jun 29, 2017 12:00 PM CDT   Infusion 120 with UC ONCOLOGY INFUSION, UC 24 ATC   [facility] ([facility])    [address] [place] [id number] [phone]            Jul 10, 2017  9:00 AM CDT   [facility] Lab Draw with UC [facility] LAB DRAW   [facility] Lab Draw ([facility])    [address] [place] [id number] [phone]            Jul 10, 2017  9:40 AM CDT   (Arrive by 9:25 AM)   CT CHEST/ABDOMEN/PELVIS W CONTRAST with UCCT1   [facility] [place] [facility] ([facility])    [address] [place] [address] [phone]           Please bring any scans or X-rays taken at other hospitals, if similar tests were done. Also bring a list of your medicines, including vitamins, minerals and over-the-counter drugs. It is safest to leave personal items at home.  Be sure to tell your doctor:   If you have any allergies.   If there s any chance you are pregnant.   If you are breastfeeding.   If you have any special needs.  You may have contrast for this exam. To prepare:   Do not eat or drink for 2 hours before your exam. If you need to take medicine, you may take it with small sips of water. (We may ask you to take liquid medicine as well.)   The day before your exam, drink extra fluids at least six 8-ounce glasses (unless your doctor tells you to restrict your fluids).  Patients over 70 or patients with diabetes or kidney problems:   If you haven t had a blood test (creatinine test) within the last 30 days, go to your clinic or Diagnostic Imaging Department for this test.  If you have diabetes:   If your kidney function is normal, continue taking your metformin (Avandamet, Glucophage, Glucovance, Metaglip) on the day of your exam.   If your kidney function is abnormal, wait 48 hours before restarting this medicine.  You will have oral contrast for this exam:   You will drink the contrast at home. Get this from your clinic or Diagnostic Imaging Department. Please follow the directions given.  Please wear loose clothing, such as a sweat suit or jogging clothes. Avoid snaps, zippers and other metal. We may ask you to undress and put on a hospital gown.  If you have any questions, please call the Imaging Department where you will have your exam.            Jul 10, 2017 12:30 PM CDT   (Arrive by 12:15 PM)   Return Visit with Kadi Dee MD   [facility] ([facility] [place])    [address] [place] [address]  Floor  [facility] [address] [phone]            Jul 10, 2017  1:00 PM CDT   Infusion 240 with UC ONCOLOGY INFUSION, UC 10 ATC   [facility] ([facility] [place])    [address]  2nd Floor  [facility] [address] [phone]              Who to contact     If you have questions or need follow up information about today's clinic visit or your schedule please contact [facility] directly at [phone].  Normal or non-critical lab and imaging results will be communicated to you by [employer], letter or phone within 4 business days after the clinic has received the results. If you do not hear from us within 7 days, please contact the clinic through Taskhubt or phone. If you have a critical or abnormal lab result, we will notify you by phone as soon as possible.  Submit refill requests through NASOFORM or call your pharmacy and they will forward the refill request to us. Please allow 3 business days for your refill to be completed.          Additional Information About Your Visit        NASOFORM Information     NASOFORM gives you secure access to your electronic health record. If you see a primary care provider, you can also send messages to your care team and make appointments. If you have questions, please call your primary care clinic.  If you do not have a primary care provider, please call [phone] and they will assist you.        Care EveryWhere ID     This is your Care EveryWhere ID. This could be used by other organizations to access your [place] medical records  [id number]         Blood Pressure from Last 3 Encounters:   06/28/17 114/72   06/27/17 132/80   06/12/17 124/85    Weight from Last 3 Encounters:   06/27/17 (!) 166.9 kg (367 lb 14.4 oz)   06/12/17 (!) 163.9 kg (361 lb 6.4 oz)   05/30/17 (!) 165.8 kg (365 lb 9.6 oz)              We Performed the Following     CARDIOLOGY EVAL ADULT REFERRAL        Primary Care Provider     Unknown Primary MD Sydney       No address on file        Equal Access to Services     [facility] : Eliza antonio harrison harleen Dunlap, wajeanda renjose ramonha, nancy junojeromejuancarlos sotelo, waxrachel idiin haylantoilio paintingpalthao cardenas. So [facility] [phone].    ATENCIÓN: Si habla español, tiene a alamo disposición servicios gratuitos de asistencia lingüística. Llame al [phone].    We comply with applicable federal civil rights laws and [place] laws. We do not discriminate on the basis of race, color, national origin, age, disability sex, sexual orientation or gender identity.            Thank you!     Thank you for choosing [facility]  for your care. Our goal is always to provide you with excellent care. Hearing back from our patients is one way we can continue to improve our services. Please take a few minutes to complete the written survey that you may receive in the mail after your visit with us. Thank you!             Your Updated Medication List - Protect others around you: Learn how to safely use, store and throw away your medicines at www.disposemymeds.org.          This list is accurate as of: 6/28/17 11:53 AM.  Always use your most recent med list.                   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)       LORazepam 0.5 MG tablet    ATIVAN    60 tablet    Take 1 tablet (0.5 mg) by mouth every 6 hours as needed for anxiety    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), 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       Multi-vitamin Tabs tablet       Take 1 tablet by mouth daily        ondansetron 8 MG tablet    ZOFRAN    30 tablet    Take 1 tablet (8 mg) by mouth every 8 hours as needed (Nausea/Vomiting)    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)       prochlorperazine 10 MG tablet    COMPAZINE    30 tablet    Take 1 tablet (10 mg) by mouth every 6 hours as needed (Nausea/Vomiting)    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)       timolol 0.5 % ophthalmic solution    TIMOPTIC     Place into both eyes daily    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)       zolpidem 5 MG tablet    AMBIEN    60 tablet    Take 1-2 tablets each evening as needed    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), 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

## 2017-06-28 NOTE — MR AVS SNAPSHOT
After Visit Summary   6/28/2017    Reuben Padilla    MRN: [id number]           Patient Information     Date Of Birth          1960        Visit Information        Provider Department      6/28/2017 9:30 AM  11 ATC;  ONCOLOGY INFUSION [facility]        Today's Diagnoses     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)    -  1      Care Instructions    Contact Numbers    [facility] Main Line: [phone]  [facility] Triage:  [phone]    Call triage with chills and/or temperature greater than or equal to 100.5, uncontrolled nausea/vomiting, diarrhea, constipation, dizziness, shortness of breath, chest pain, bleeding, unexplained bruising, or any new/concerning symptoms, questions/concerns.     If you are having any concerning symptoms or wish to speak to a provider before your next infusion visit, please call your care coordinator or triage to notify them so we can adequately serve you.       After Hours: [phone]    If after hours, weekends, or holidays, call main hospital  and ask for Oncology doctor on call.         June 2017 Sunday Monday Tuesday Wednesday Thursday Friday Saturday                       1     2     3       4     5     6     7     8     9     10       11     12     P [facility] LAB DRAW    7:30 AM   (15 min.)   UC [facility] LAB DRAW   [facility] Lab Draw     [facility] ONC INFUSION 240    8:00 AM   (240 min.)    ONCOLOGY INFUSION   [facility] 13     14     15     16     17       18     19     20     21     22     23     24       25     26     27     [facility] LAB DRAW    8:30 AM   (15 min.)    [facility] LAB DRAW   [facility] Lab Draw     UMP RETURN    8:45 AM   (60 min.)   Paula Rodriguez PA-C   [facility]     UMP ONC INFUSION 240    9:30 AM   (240 min.)    ONCOLOGY INFUSION   [facility] 28     UMP RETURN    9:25 AM   (50 min.)   Katalina Ramirez PA-C   [facility]    9:30 AM   (60 min.)   UC ONCOLOGY INFUSION   [facility] 29     UMP ONC INFUSION 120   12:00 PM   (120 min.)   UC ONCOLOGY INFUSION   [facility] 30 July 2017 Sunday Monday Tuesday Wednesday Thursday Friday Saturday                                 1       2     3     UMP NEW ATRIAL FIBRILLATION   10:45 AM   (40 min.)   Bel Cox MD   [facility] 4     5     6     7     8       9     10     [facility] LAB DRAW    9:00 AM   (15 min.)    [facility] LAB DRAW   [facility] Lab Draw     CT CHEST/ABDOMEN/PELVIS W    9:25 AM   (20 min.)   UCCT1   [facility]     UMP RETURN   12:15 PM   (30 min.)   Kadi Dee MD   [facility]     UMP ONC INFUSION 240    1:00 PM   (240 min.)    ONCOLOGY INFUSION   [facility] 11     12     13     14     15       16     17     18     19     20     21     22       23     24     25     26     27     28     29       30     31                                           Lab Results:  Recent Results (from the past 12 hour(s))   CBC with platelets differential    Collection Time: 06/28/17  9:50 AM   Result Value Ref Range    WBC 6.3 4.0 - 11.0 10e9/L    RBC Count 4.55 4.4 - 5.9 10e12/L    Hemoglobin 13.4 13.3 - 17.7 g/dL    Hematocrit 39.6 (L) 40.0 - 53.0 %    MCV 87 78 - 100 fl    MCH 29.5 26.5 - 33.0 pg    MCHC 33.8 31.5 - 36.5 g/dL    RDW 16.2 (H) 10.0 - 15.0 %    Platelet Count 100 (L) 150 - 450 10e9/L    Diff Method Automated Method     % Neutrophils 86.4 %    % Lymphocytes 7.2 %    % Monocytes 6.1 %    % Eosinophils 0.0 %    % Basophils 0.0 %    % Immature Granulocytes 0.3 %    Nucleated RBCs 0 0 /100    Absolute Neutrophil 5.4 1.6 - 8.3 10e9/L    Absolute Lymphocytes 0.5 (L) 0.8 - 5.3 10e9/L    Absolute Monocytes 0.4 0.0 - 1.3 10e9/L    Absolute Eosinophils 0.0 0.0 - 0.7 10e9/L    Absolute Basophils 0.0 0.0 - 0.2 10e9/L     Abs Immature Granulocytes 0.0 0 - 0.4 10e9/L    Absolute Nucleated RBC 0.0    Comprehensive metabolic panel    Collection Time: 06/28/17  9:50 AM   Result Value Ref Range    Sodium 142 133 - 144 mmol/L    Potassium 4.0 3.4 - 5.3 mmol/L    Chloride 108 94 - 109 mmol/L    Carbon Dioxide 25 20 - 32 mmol/L    Anion Gap 8 3 - 14 mmol/L    Glucose 143 (H) 70 - 99 mg/dL    Urea Nitrogen 11 7 - 30 mg/dL    Creatinine 0.70 0.66 - 1.25 mg/dL    GFR Estimate >90  Non  GFR Calc   >60 mL/min/1.7m2    GFR Estimate If Black >90   GFR Calc   >60 mL/min/1.7m2    Calcium 8.9 8.5 - 10.1 mg/dL    Bilirubin Total 0.4 0.2 - 1.3 mg/dL    Albumin 3.1 (L) 3.4 - 5.0 g/dL    Protein Total 6.9 6.8 - 8.8 g/dL    Alkaline Phosphatase 95 40 - 150 U/L    ALT 29 0 - 70 U/L    AST 24 0 - 45 U/L               Follow-ups after your visit        Your next 10 appointments already scheduled     Jun 29, 2017 12:00 PM CDT   Infusion 120 with  ONCOLOGY INFUSION, UC 24 ATC   [facility] ([facility])    [address] [place] [address]  2nd [facility] [id number] [phone]            Jul 03, 2017 11:00 AM CDT   (Arrive by 10:45 AM)   ATRIAL FIBULATION VISIT with Bel Cox MD   [facility] ([facility])    [address] [place] [address]  3rd [facility] [id number] [phone]            Jul 10, 2017  9:00 AM CDT   [facility] Lab Draw with UC [facility] LAB DRAW   [facility] Lab Draw ([facility])    [address] [place] [address]  2nd [facility] [id number] [phone]            Jul 10, 2017  9:40 AM CDT   (Arrive by 9:25 AM)   CT CHEST/ABDOMEN/PELVIS W CONTRAST with UCCT1   [facility] [place] [facility] ([facility])    [address] [place] [address]  1st [facility] [id number] [phone]           Please bring any scans or X-rays taken at other hospitals, if similar tests were done.  Also bring a list of your medicines, including vitamins, minerals and over-the-counter drugs. It is safest to leave personal items at home.  Be sure to tell your doctor:   If you have any allergies.   If there s any chance you are pregnant.   If you are breastfeeding.   If you have any special needs.  You may have contrast for this exam. To prepare:   Do not eat or drink for 2 hours before your exam. If you need to take medicine, you may take it with small sips of water. (We may ask you to take liquid medicine as well.)   The day before your exam, drink extra fluids at least six 8-ounce glasses (unless your doctor tells you to restrict your fluids).  Patients over 70 or patients with diabetes or kidney problems:   If you haven t had a blood test (creatinine test) within the last 30 days, go to your clinic or Diagnostic Imaging Department for this test.  If you have diabetes:   If your kidney function is normal, continue taking your metformin (Avandamet, Glucophage, Glucovance, Metaglip) on the day of your exam.   If your kidney function is abnormal, wait 48 hours before restarting this medicine.  You will have oral contrast for this exam:   You will drink the contrast at home. Get this from your clinic or Diagnostic Imaging Department. Please follow the directions given.  Please wear loose clothing, such as a sweat suit or jogging clothes. Avoid snaps, zippers and other metal. We may ask you to undress and put on a hospital gown.  If you have any questions, please call the Imaging Department where you will have your exam.            Jul 10, 2017 12:30 PM CDT   (Arrive by 12:15 PM)   Return Visit with Kadi Dee MD   [facility] ([facility])    [address]  2nd Floor  [facility] [address] [phone]            Jul 10, 2017  1:00 PM CDT   Infusion 240 with  ONCOLOGY INFUSION,  10 ATC   [facility] ([facility]  [place])    [address]  2nd [facility] [address] [phone]              Who to contact     If you have questions or need follow up information about today's clinic visit or your schedule please contact [facility] directly at [phone].  Normal or non-critical lab and imaging results will be communicated to you by MyChart, letter or phone within 4 business days after the clinic has received the results. If you do not hear from us within 7 days, please contact the clinic through Marblarhart or phone. If you have a critical or abnormal lab result, we will notify you by phone as soon as possible.  Submit refill requests through GigOwl or call your pharmacy and they will forward the refill request to us. Please allow 3 business days for your refill to be completed.          Additional Information About Your Visit        MyChart Information     GigOwl gives you secure access to your electronic health record. If you see a primary care provider, you can also send messages to your care team and make appointments. If you have questions, please call your primary care clinic.  If you do not have a primary care provider, please call [phone] and they will assist you.        Care EveryWhere ID     This is your Care EveryWhere ID. This could be used by other organizations to access your [place] medical records  [id number]        Your Vitals Were     Pulse Temperature Respirations Pulse Oximetry          87 97.5  F (36.4  C) 18 97%         Blood Pressure from Last 3 Encounters:   06/28/17 124/79   06/27/17 132/80   06/12/17 124/85    Weight from Last 3 Encounters:   06/27/17 (!) 166.9 kg (367 lb 14.4 oz)   06/12/17 (!) 163.9 kg (361 lb 6.4 oz)   05/30/17 (!) 165.8 kg (365 lb 9.6 oz)              We Performed the Following     CBC with platelets differential     Comprehensive metabolic panel     EKG 12-lead complete w/read - Clinics        Primary Care Provider    Unknown Primary  MD Sydney       No address on file        Equal Access to Services     [facility] : Hadii antonio harrison harleen Dunlap, wajeanda luqaleksandra, qageorgina junojeromejuancarlos sotelo, geraldo paintingpalthao cardenas. So [facility] [phone].    ATENCIÓN: Si habla español, tiene a alamo disposición servicios gratuitos de asistencia lingüística. Llame al [phone].    We comply with applicable federal civil rights laws and [place] laws. We do not discriminate on the basis of race, color, national origin, age, disability sex, sexual orientation or gender identity.            Thank you!     Thank you for choosing [facility]  for your care. Our goal is always to provide you with excellent care. Hearing back from our patients is one way we can continue to improve our services. Please take a few minutes to complete the written survey that you may receive in the mail after your visit with us. Thank you!             Your Updated Medication List - Protect others around you: Learn how to safely use, store and throw away your medicines at www.disposemymeds.org.          This list is accurate as of: 6/28/17  1:23 PM.  Always use your most recent med list.                   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)       LORazepam 0.5 MG tablet    ATIVAN    60 tablet    Take 1 tablet (0.5 mg) by mouth every 6 hours as needed for anxiety    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), 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       Multi-vitamin Tabs tablet      Take 1  tablet by mouth daily        ondansetron 8 MG tablet    ZOFRAN    30 tablet    Take 1 tablet (8 mg) by mouth every 8 hours as needed (Nausea/Vomiting)    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)       prochlorperazine 10 MG tablet    COMPAZINE    30 tablet    Take 1 tablet (10 mg) by mouth every 6 hours as needed (Nausea/Vomiting)    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)       timolol 0.5 % ophthalmic solution    TIMOPTIC     Place into both eyes daily    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)       zolpidem 5 MG tablet    AMBIEN    60 tablet    Take 1-2 tablets each evening as needed    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), 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

## 2017-06-28 NOTE — PROGRESS NOTES
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 His tumor is positive for cytokeratin 20, negative for P63 and CK7, and HER2 is negative. He started on FOLFOX (5FU/oxaliplatin) on 5/15/2017.    INTERVAL HISTORY:   Reuben received cycle 4 of FOLFOX yesterday.     He woke up this morning feeling lightheaded when he started moving around. He was able to ambulate to the kitchen at [facility], but felt dizzy and lightheaded walking and standing. He sat down, and felt better. When he got back up, his vision felt like it was darkening and he felt sweaty. He would feel better every time he sat down. He had no SOB, chest pain, or palpitations. No nausea or vomiting, no diarrhea. He did feel he urinated way more than normal yesterday, now urinating normal. He had drank two bottles of Gatorade yesterday and has had about 12 oz of fluid this morning. This has never happened to him before. No new meds. He hasn't had any other symptoms today.       Current Outpatient Prescriptions   Medication Sig Dispense Refill     ondansetron (ZOFRAN) 8 MG tablet Take 1 tablet (8 mg) by mouth every 8 hours as needed (Nausea/Vomiting) 30 tablet 2     lidocaine-prilocaine (EMLA) cream Apply topically as needed for moderate pain 30 g 3     prochlorperazine (COMPAZINE) 10 MG tablet Take 1 tablet (10 mg) by mouth every 6 hours as needed (Nausea/Vomiting) (Patient not taking: Reported on 6/28/2017) 30 tablet 2     LORazepam (ATIVAN) 0.5 MG tablet Take 1 tablet (0.5 mg) by mouth every 6 hours as needed for anxiety (Patient not taking: Reported on 6/27/2017) 60 tablet 1     zolpidem (AMBIEN) 5 MG tablet Take 1-2 tablets each evening as needed (Patient not taking: Reported on 6/28/2017) 60 tablet 1     multivitamin, therapeutic with  minerals (MULTI-VITAMIN) TABS tablet Take 1 tablet by mouth daily       fish oil-omega-3 fatty acids 1000 MG capsule Take 2 g by mouth daily       IBUPROFEN PO Take 800 mg by mouth every 8 hours as needed for moderate pain       latanoprost (XALATAN) 0.005 % ophthalmic solution Place into both eyes At Bedtime       timolol (TIMOPTIC) 0.5 % ophthalmic solution Place into both eyes daily            Allergies   Allergen Reactions     Penicillin G Other (See Comments)     Pt not sure-     Penicillins Unknown       PHYSICAL EXAMINATION    Vital Signs 6/28/2017 6/28/2017 6/28/2017 6/28/2017 6/28/2017   Systolic 116 87 83 99 106   Diastolic 67 55 55 60 66     Vital Signs 6/28/2017 6/28/2017 6/28/2017 6/28/2017   Systolic 99 97 111 114   Diastolic 68 64 69 72     Constitutional: Alert, oriented male in no visible distress.  Eyes: PERRL. Anicteric sclerae.  ENT/Mouth: OM moist and pink without lesions or thrush.  CV:a fib, without RVR  Resp: CTAB throughout  Abdomen: Soft, non-tender, non-distended. Bowel sounds present.Limited by body habitus and seated position  Extremities: No lower extremity edema appreciated.  Skin: Warm, dry. No bruising or petechiae noted.  Lymph: No cervical or supraclavicular lymphadenopathy appreciated.   Neuro: CN II-XII grossly intact.    LABS:  Results for orders placed or performed in visit on 06/28/17 (from the past 24 hour(s))   CBC with platelets differential   Result Value Ref Range    WBC 6.3 4.0 - 11.0 10e9/L    RBC Count 4.55 4.4 - 5.9 10e12/L    Hemoglobin 13.4 13.3 - 17.7 g/dL    Hematocrit 39.6 (L) 40.0 - 53.0 %    MCV 87 78 - 100 fl    MCH 29.5 26.5 - 33.0 pg    MCHC 33.8 31.5 - 36.5 g/dL    RDW 16.2 (H) 10.0 - 15.0 %    Platelet Count 100 (L) 150 - 450 10e9/L    Diff Method Automated Method     % Neutrophils 86.4 %    % Lymphocytes 7.2 %    % Monocytes 6.1 %    % Eosinophils 0.0 %    % Basophils 0.0 %    % Immature Granulocytes 0.3 %    Nucleated RBCs 0 0 /100    Absolute Neutrophil  5.4 1.6 - 8.3 10e9/L    Absolute Lymphocytes 0.5 (L) 0.8 - 5.3 10e9/L    Absolute Monocytes 0.4 0.0 - 1.3 10e9/L    Absolute Eosinophils 0.0 0.0 - 0.7 10e9/L    Absolute Basophils 0.0 0.0 - 0.2 10e9/L    Abs Immature Granulocytes 0.0 0 - 0.4 10e9/L    Absolute Nucleated RBC 0.0    Comprehensive metabolic panel   Result Value Ref Range    Sodium 142 133 - 144 mmol/L    Potassium 4.0 3.4 - 5.3 mmol/L    Chloride 108 94 - 109 mmol/L    Carbon Dioxide 25 20 - 32 mmol/L    Anion Gap 8 3 - 14 mmol/L    Glucose 143 (H) 70 - 99 mg/dL    Urea Nitrogen 11 7 - 30 mg/dL    Creatinine 0.70 0.66 - 1.25 mg/dL    GFR Estimate >90  Non  GFR Calc   >60 mL/min/1.7m2    GFR Estimate If Black >90   GFR Calc   >60 mL/min/1.7m2    Calcium 8.9 8.5 - 10.1 mg/dL    Bilirubin Total 0.4 0.2 - 1.3 mg/dL    Albumin 3.1 (L) 3.4 - 5.0 g/dL    Protein Total 6.9 6.8 - 8.8 g/dL    Alkaline Phosphatase 95 40 - 150 U/L    ALT 29 0 - 70 U/L    AST 24 0 - 45 U/L         IMPRESSION/PLAN:  Reuben Padilla is a 56 year old male with metastatic esophogeal involvement the liver and widespread lavon involvement, currently on FOLFOX. He was seen today on an add-on basis for hypotension.    1. Hypotension/lightheadedness: Received FOLFOX yesterday and awoke with lightheadedness with standing. BP on arrival 87/55. EKG showing a fib without RVR. (patient has history of paroxysmal atrial fibrillation). Vitals confirmed orthostatic hypotension. His symptoms resolved with a liter of NS and was able to tolerate ambulating without difficulty. His 5-FU pump was reconnected. He will return tomorrow for a repeat liter of NS if needed. He understands to call with any recurrent symptoms.    2. Metastatic esophogeal cancer. Resume FOLFOX given resolution of symptoms. He will return for pump d/c tomorrow and receive IVF.    3. A fib. History of paroxysmal atrial fibrillation. He is in a fib today, asymptomatic. He declined anticoagulation. I  will arrange for him to see cards., as he missed his consult visit.      Katalina Ramirez PA-C  Hematology, Oncology, and Transplant  [facility]  [address] [place] [id number] [phone]

## 2017-06-28 NOTE — LETTER
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 His tumor is positive for cytokeratin 20, negative for P63 and CK7, and HER2 is negative. He started on FOLFOX (5FU/oxaliplatin) on 5/15/2017.    INTERVAL HISTORY:   Reuben received cycle 4 of FOLFOX yesterday.     He woke up this morning feeling lightheaded when he started moving around. He was able to ambulate to the kitchen at [facility], but felt dizzy and lightheaded walking and standing. He sat down, and felt better. When he got back up, his vision felt like it was darkening and he felt sweaty. He would feel better every time he sat down. He had no SOB, chest pain, or palpitations. No nausea or vomiting, no diarrhea. He did feel he urinated way more than normal yesterday, now urinating normal. He had drank two bottles of Gatorade yesterday and has had about 12 oz of fluid this morning. This has never happened to him before. No new meds. He hasn't had any other symptoms today.       Current Outpatient Prescriptions   Medication Sig Dispense Refill     ondansetron (ZOFRAN) 8 MG tablet Take 1 tablet (8 mg) by mouth every 8 hours as needed (Nausea/Vomiting) 30 tablet 2     lidocaine-prilocaine (EMLA) cream Apply topically as needed for moderate pain 30 g 3     prochlorperazine (COMPAZINE) 10 MG tablet Take 1 tablet (10 mg) by mouth every 6 hours as needed (Nausea/Vomiting) (Patient not taking: Reported on 6/28/2017) 30 tablet 2     LORazepam (ATIVAN) 0.5 MG tablet Take 1 tablet (0.5 mg) by mouth every 6 hours as needed for anxiety (Patient not taking: Reported on 6/27/2017) 60 tablet 1     zolpidem (AMBIEN) 5 MG tablet Take 1-2 tablets each evening as needed (Patient not taking:  Reported on 6/28/2017) 60 tablet 1     multivitamin, therapeutic with minerals (MULTI-VITAMIN) TABS tablet Take 1 tablet by mouth daily       fish oil-omega-3 fatty acids 1000 MG capsule Take 2 g by mouth daily       IBUPROFEN PO Take 800 mg by mouth every 8 hours as needed for moderate pain       latanoprost (XALATAN) 0.005 % ophthalmic solution Place into both eyes At Bedtime       timolol (TIMOPTIC) 0.5 % ophthalmic solution Place into both eyes daily            Allergies   Allergen Reactions     Penicillin G Other (See Comments)     Pt not sure-     Penicillins Unknown       PHYSICAL EXAMINATION    Vital Signs 6/28/2017 6/28/2017 6/28/2017 6/28/2017 6/28/2017   Systolic 116 87 83 99 106   Diastolic 67 55 55 60 66     Vital Signs 6/28/2017 6/28/2017 6/28/2017 6/28/2017   Systolic 99 97 111 114   Diastolic 68 64 69 72     Constitutional: Alert, oriented male in no visible distress.  Eyes: PERRL. Anicteric sclerae.  ENT/Mouth: OM moist and pink without lesions or thrush.  CV:a fib, without RVR  Resp: CTAB throughout  Abdomen: Soft, non-tender, non-distended. Bowel sounds present.Limited by body habitus and seated position  Extremities: No lower extremity edema appreciated.  Skin: Warm, dry. No bruising or petechiae noted.  Lymph: No cervical or supraclavicular lymphadenopathy appreciated.   Neuro: CN II-XII grossly intact.    LABS:  Results for orders placed or performed in visit on 06/28/17 (from the past 24 hour(s))   CBC with platelets differential   Result Value Ref Range    WBC 6.3 4.0 - 11.0 10e9/L    RBC Count 4.55 4.4 - 5.9 10e12/L    Hemoglobin 13.4 13.3 - 17.7 g/dL    Hematocrit 39.6 (L) 40.0 - 53.0 %    MCV 87 78 - 100 fl    MCH 29.5 26.5 - 33.0 pg    MCHC 33.8 31.5 - 36.5 g/dL    RDW 16.2 (H) 10.0 - 15.0 %    Platelet Count 100 (L) 150 - 450 10e9/L    Diff Method Automated Method     % Neutrophils 86.4 %    % Lymphocytes 7.2 %    % Monocytes 6.1 %    % Eosinophils 0.0 %    % Basophils 0.0 %    %  Immature Granulocytes 0.3 %    Nucleated RBCs 0 0 /100    Absolute Neutrophil 5.4 1.6 - 8.3 10e9/L    Absolute Lymphocytes 0.5 (L) 0.8 - 5.3 10e9/L    Absolute Monocytes 0.4 0.0 - 1.3 10e9/L    Absolute Eosinophils 0.0 0.0 - 0.7 10e9/L    Absolute Basophils 0.0 0.0 - 0.2 10e9/L    Abs Immature Granulocytes 0.0 0 - 0.4 10e9/L    Absolute Nucleated RBC 0.0    Comprehensive metabolic panel   Result Value Ref Range    Sodium 142 133 - 144 mmol/L    Potassium 4.0 3.4 - 5.3 mmol/L    Chloride 108 94 - 109 mmol/L    Carbon Dioxide 25 20 - 32 mmol/L    Anion Gap 8 3 - 14 mmol/L    Glucose 143 (H) 70 - 99 mg/dL    Urea Nitrogen 11 7 - 30 mg/dL    Creatinine 0.70 0.66 - 1.25 mg/dL    GFR Estimate >90  Non  GFR Calc   >60 mL/min/1.7m2    GFR Estimate If Black >90   GFR Calc   >60 mL/min/1.7m2    Calcium 8.9 8.5 - 10.1 mg/dL    Bilirubin Total 0.4 0.2 - 1.3 mg/dL    Albumin 3.1 (L) 3.4 - 5.0 g/dL    Protein Total 6.9 6.8 - 8.8 g/dL    Alkaline Phosphatase 95 40 - 150 U/L    ALT 29 0 - 70 U/L    AST 24 0 - 45 U/L         IMPRESSION/PLAN:  Reuben Padilla is a 56 year old male with metastatic esophogeal involvement the liver and widespread lavon involvement, currently on FOLFOX. He was seen today on an add-on basis for hypotension.    1. Hypotension/lightheadedness: Received FOLFOX yesterday and awoke with lightheadedness with standing. BP on arrival 87/55. EKG showing a fib without RVR. (patient has history of paroxysmal atrial fibrillation). Vitals confirmed orthostatic hypotension. His symptoms resolved with a liter of NS and was able to tolerate ambulating without difficulty. His 5-FU pump was reconnected. He will return tomorrow for a repeat liter of NS if needed. He understands to call with any recurrent symptoms.    2. Metastatic esophogeal cancer. Resume FOLFOX given resolution of symptoms. He will return for pump d/c tomorrow and receive IVF.    3. A fib. History of paroxysmal atrial  fibrillation. He is in a fib today, asymptomatic. He declined anticoagulation. I will arrange for him to see cards., as he missed his consult visit.      Katalina Ramirez PA-C  Hematology, Oncology, and Transplant  [facility] [address] [id number] [phone]

## 2017-06-28 NOTE — PATIENT INSTRUCTIONS
Contact Numbers    [facility] Main Line: [phone]  [facility] Triage:  [phone]    Call triage with chills and/or temperature greater than or equal to 100.5, uncontrolled nausea/vomiting, diarrhea, constipation, dizziness, shortness of breath, chest pain, bleeding, unexplained bruising, or any new/concerning symptoms, questions/concerns.     If you are having any concerning symptoms or wish to speak to a provider before your next infusion visit, please call your care coordinator or triage to notify them so we can adequately serve you.       After Hours: [phone]    If after hours, weekends, or holidays, call main hospital  and ask for Oncology doctor on call.         June 2017 Sunday Monday Tuesday Wednesday Thursday Friday Saturday                       1     2     3       4     5     6     7     8     9     10       11     12     [facility]    7:30 AM   (15 min.)    [facility]RAW   [facility] Lab Draw     [facility] ONC INFUSION 240    8:00 AM   (240 min.)    ONCOLOGY INFUSION   [facility] 13     14     15     16     17       18     19     20     21     22     23     24       25     26     27     [facility]    8:30 AM   (15 min.)    [facility] D[facility]   [facility] Lab Draw     [facility] RETURN    8:45 AM   (60 min.)   Paula Rodriguez PA-C   [facility]     [facility] ONC INFUSION 240    9:30 AM   (240 min.)    ONCOLOGY INFUSION   [facility] 28     [facility] RETURN    9:25 AM   (50 min.)   Katalina Ramirez PA-C   [facility]     [facility] ONC INFUSION 60    9:30 AM   (60 min.)    ONCOLOGY INFUSION   [facility] 29     [facility] ONC INFUSION 120   12:00 PM   (120 min.)    ONCOLOGY INFUSION   [facility] 30 July 2017 Sunday Monday Tuesday Wednesday Thursday Friday Saturday                                 1       2     3     [facility] NEW ATRIAL FIBRILLATION    10:45 AM   (40 min.)   Bel Cox MD   [facility] 4     5     6     7     8       9     10     [facility] DRAW    9:00 AM   (15 min.)    [facility] DRAW   [facility] Lab Draw     CT CHEST/ABDOMEN/PELVIS W    9:25 AM   (20 min.)   UCCT1   [facility] CT     [facility] RETURN   12:15 PM   (30 min.)   Kadi Dee MD   [facility] ONC INFUSION 240    1:00 PM   (240 min.)    ONCOLOGY INFUSION   [facility] 11     12     13     14     15       16     17     18     19     20     21     22       23     24     25     26     27     28     29       30     31                                           Lab Results:  Recent Results (from the past 12 hour(s))   CBC with platelets differential    Collection Time: 06/28/17  9:50 AM   Result Value Ref Range    WBC 6.3 4.0 - 11.0 10e9/L    RBC Count 4.55 4.4 - 5.9 10e12/L    Hemoglobin 13.4 13.3 - 17.7 g/dL    Hematocrit 39.6 (L) 40.0 - 53.0 %    MCV 87 78 - 100 fl    MCH 29.5 26.5 - 33.0 pg    MCHC 33.8 31.5 - 36.5 g/dL    RDW 16.2 (H) 10.0 - 15.0 %    Platelet Count 100 (L) 150 - 450 10e9/L    Diff Method Automated Method     % Neutrophils 86.4 %    % Lymphocytes 7.2 %    % Monocytes 6.1 %    % Eosinophils 0.0 %    % Basophils 0.0 %    % Immature Granulocytes 0.3 %    Nucleated RBCs 0 0 /100    Absolute Neutrophil 5.4 1.6 - 8.3 10e9/L    Absolute Lymphocytes 0.5 (L) 0.8 - 5.3 10e9/L    Absolute Monocytes 0.4 0.0 - 1.3 10e9/L    Absolute Eosinophils 0.0 0.0 - 0.7 10e9/L    Absolute Basophils 0.0 0.0 - 0.2 10e9/L    Abs Immature Granulocytes 0.0 0 - 0.4 10e9/L    Absolute Nucleated RBC 0.0    Comprehensive metabolic panel    Collection Time: 06/28/17  9:50 AM   Result Value Ref Range    Sodium 142 133 - 144 mmol/L    Potassium 4.0 3.4 - 5.3 mmol/L    Chloride 108 94 - 109 mmol/L    Carbon Dioxide 25 20 - 32 mmol/L    Anion Gap 8 3 - 14 mmol/L    Glucose 143 (H) 70 - 99 mg/dL    Urea Nitrogen 11 7 -  30 mg/dL    Creatinine 0.70 0.66 - 1.25 mg/dL    GFR Estimate >90  Non  GFR Calc   >60 mL/min/1.7m2    GFR Estimate If Black >90   GFR Calc   >60 mL/min/1.7m2    Calcium 8.9 8.5 - 10.1 mg/dL    Bilirubin Total 0.4 0.2 - 1.3 mg/dL    Albumin 3.1 (L) 3.4 - 5.0 g/dL    Protein Total 6.9 6.8 - 8.8 g/dL    Alkaline Phosphatase 95 40 - 150 U/L    ALT 29 0 - 70 U/L    AST 24 0 - 45 U/L

## 2017-06-29 ENCOUNTER — INFUSION THERAPY VISIT (OUTPATIENT)
Dept: ONCOLOGY | Facility: CLINIC | Age: 57
End: 2017-06-29
Attending: INTERNAL MEDICINE
Payer: COMMERCIAL

## 2017-06-29 VITALS
DIASTOLIC BLOOD PRESSURE: 67 MMHG | HEART RATE: 66 BPM | SYSTOLIC BLOOD PRESSURE: 133 MMHG | TEMPERATURE: 97.9 F | OXYGEN SATURATION: 99 %

## 2017-06-29 DIAGNOSIS — C15.5 CANCER OF DISTAL THIRD OF ESOPHAGUS (H): Primary | ICD-10-CM

## 2017-06-29 PROCEDURE — 25000128 H RX IP 250 OP 636: Mod: ZF | Performed by: INTERNAL MEDICINE

## 2017-06-29 PROCEDURE — 96523 IRRIG DRUG DELIVERY DEVICE: CPT

## 2017-06-29 RX ORDER — HEPARIN SODIUM (PORCINE) LOCK FLUSH IV SOLN 100 UNIT/ML 100 UNIT/ML
500 SOLUTION INTRAVENOUS ONCE
Status: COMPLETED | OUTPATIENT
Start: 2017-06-29 | End: 2017-06-29

## 2017-06-29 RX ADMIN — SODIUM CHLORIDE, PRESERVATIVE FREE 500 UNITS: 5 INJECTION INTRAVENOUS at 11:40

## 2017-06-29 ASSESSMENT — PAIN SCALES - GENERAL: PAINLEVEL: NO PAIN (0)

## 2017-06-29 NOTE — PROGRESS NOTES
Infusion Nursing Note:  Reuben Padilla presents today for Fluorouracil pump d/c.    Patient seen by provider today: No   present during visit today: Not Applicable.    Note: feeling better today when compared to yesterday.  Orthostatic BP's checked, see flow sheet for vitals and assessment.  Declined the need for IV fluids today.    Intravenous Access:  Implanted Port.    Post Infusion Assessment:  Patient tolerated infusion without incident.  Blood return noted pre and post infusion.  Site patent and intact, free from redness, edema or discomfort.  No evidence of extravasations.  Access discontinued per protocol.    Discharge Plan:   Patient declined prescription refills.  AVS to patient via [employer].  Patient will return 7/10/17 for labs, CT, [facility] for next appointment.   Patient discharged in stable condition accompanied by: self.  Departure Mode: Ambulatory.  Face to Face time: 0.    Yadira Calderon RN

## 2017-06-29 NOTE — PATIENT INSTRUCTIONS
[facility] Main Line: [phone]    Call triage nurse with chills and/or temperature greater than or equal to 100.4, uncontrolled nausea/vomiting, diarrhea, constipation, dizziness, shortness of breath, chest pain, bleeding, unexplained bruising, or any new/concerning symptoms, questions/concerns.   If you are having any concerning symptoms or wish to speak to a provider before your next infusion visit, please call your care coordinator or triage to notify them so we can adequately serve you.   Triage Nurse Line: [phone]    If after hours, weekends, or holidays, call main hospital  and ask for Oncology doctor on call @ [phone]             June 2017 Sunday Monday Tuesday Wednesday Thursday Friday Saturday                       1     2     3       4     5     6     7     8     9     10       11     12     [facility] D[facility]    7:30 AM   (15 min.)    [facility] DRAW   [facility] Lab Draw     [facility] ONC INFUSION 240    8:00 AM   (240 min.)    ONCOLOGY INFUSION   [facility] 13     14     15     16     17       18     19     20     21     22     23     24       25     26     27     [facility] DRAW    8:30 AM   (15 min.)    [facility] DRAW   [facility] Lab Draw     [facility] RETURN    8:45 AM   (60 min.)   Paula Rodriguez PA-C   [facility]     [facility] ONC INFUSION 240    9:30 AM   (240 min.)    ONCOLOGY INFUSION   [facility] 28     [facility] RETURN    9:25 AM   (50 min.)   Katalina Ramirez PA-C   [facility]     [facility] ONC INFUSION 60    9:30 AM   (60 min.)    ONCOLOGY INFUSION   [facility] 29     [facility] ONC INFUSION 120   12:00 PM   (120 min.)    ONCOLOGY INFUSION   [facility] 30 July 2017 Sunday Monday Tuesday Wednesday Thursday Friday Saturday                                 1       2     3     [facility] NEW ATRIAL FIBRILLATION    10:45 AM   (40 min.)   Bel Cox MD   [facility] 4     5     6     7     8       9     10     [facility]    9:00 AM   (15 min.)   [facility] LAB DRAW   [facility] Lab Draw     CT CHEST/ABDOMEN/PELVIS W    9:25 AM   (20 min.)   UCCT1   [facility] CT     [facility] RETURN   12:15 PM   (30 min.)   Kadi Dee MD   [facility] ONC INFUSION 240    1:00 PM   (240 min.)    ONCOLOGY INFUSION   [facility] 11     12     13     14     15       16     17     18     19     20     21     22       23     24     25     26     27     28     29       30     31                                           Lab Results:  No results found for this or any previous visit (from the past 12 hour(s)).

## 2017-06-29 NOTE — MR AVS SNAPSHOT
After Visit Summary   6/29/2017    Reuben Padilla    MRN: [id number]           Patient Information     Date Of Birth          1960        Visit Information        Provider Department      6/29/2017 12:00 PM  24 ATC;  ONCOLOGY INFUSION [facility]        Today's Diagnoses     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)    -  1      [facility] Main Line: [phone]    Call triage nurse with chills and/or temperature greater than or equal to 100.4, uncontrolled nausea/vomiting, diarrhea, constipation, dizziness, shortness of breath, chest pain, bleeding, unexplained bruising, or any new/concerning symptoms, questions/concerns.   If you are having any concerning symptoms or wish to speak to a provider before your next infusion visit, please call your care coordinator or triage to notify them so we can adequately serve you.   Triage Nurse Line: [phone]    If after hours, weekends, or holidays, call main hospital  and ask for Oncology doctor on call @ [phone]             June 2017 Sunday Monday Tuesday Wednesday Thursday Friday Saturday                       1     2     3       4     5     6     7     8     9     10       11     12     [facility] LAB DRAW    7:30 AM   (15 min.)   [facility] LAB DRAW   [employer] Lab Draw     [facility] ONC INFUSION 240    8:00 AM   (240 min.)    ONCOLOGY INFUSION   [facility] 13     14     15     16     17       18     19     20     21     22     23     24       25     26     27     [facility] DRAW    8:30 AM   (15 min.)    [facility] DRAW   [employer] Lab Draw     [facility] RETURN    8:45 AM   (60 min.)   Paula Rodriguez PA-C   [facility]     [facility] ONC INFUSION 240    9:30 AM   (240 min.)    ONCOLOGY INFUSION   [facility] 28     [facility] RETURN    9:25 AM   (50 min.)   Katalina Ramirez PA-C   [facility]  Clinic     [facility] ONC INFUSION 60    9:30 AM   (60 min.)   [facility] ONCOLOGY INFUSION   [facility] 29     [facility] ONC INFUSION 120   12:00 PM   (120 min.)   [facility] ONCOLOGY INFUSION   [facility] 30 July 2017 Sunday Monday Tuesday Wednesday Thursday Friday Saturday                                 1       2     3     [facility] NEW ATRIAL FIBRILLATION   10:45 AM   (40 min.)   Bel Cox MD   [facility] 4     5     6     7     8       9     10     [facility] DRAW    9:00 AM   (15 min.)    [facility]     CT CHEST/ABDOMEN/PELVIS W    9:25 AM   (20 min.)   UCCT1   [facility] CT     [facility] RETURN   12:15 PM   (30 min.)   Kadi Dee MD   [facility]     [facility] ONC INFUSION 240    1:00 PM   (240 min.)    ONCOLOGY INFUSION   [facility] 11     12     13     14     15       16     17     18     19     20     21     22       23     24     25     26     27     28     29       30     31                                           Lab Results:  No results found for this or any previous visit (from the past 12 hour(s)).          Follow-ups after your visit        Your next 10 appointments already scheduled     Jul 03, 2017 11:00 AM CDT   (Arrive by 10:45 AM)   ATRIAL FIBULATION VISIT with Bel Cox MD   [facility] ([facility])    [address] [place] [address]  3rd [facility] [address]   [phone]            Jul 10, 2017  9:00 AM CDT   [facility] Draw with  [facility] DRAW   [facility]    [address] [place] [address]  [address]nd [facility] [address]   [phone]            Jul 10, 2017  9:40 AM CDT   (Arrive by 9:25 AM)   CT CHEST/ABDOMEN/PELVIS W CONTRAST with UCCT1   [facility] CT ([facility])    [address] [place] [address]  1st [facility]  [id number] [phone]           Please bring any scans or X-rays taken at other hospitals, if similar tests were done. Also bring a list of your medicines, including vitamins, minerals and over-the-counter drugs. It is safest to leave personal items at home.  Be sure to tell your doctor:   If you have any allergies.   If there s any chance you are pregnant.   If you are breastfeeding.   If you have any special needs.  You may have contrast for this exam. To prepare:   Do not eat or drink for 2 hours before your exam. If you need to take medicine, you may take it with small sips of water. (We may ask you to take liquid medicine as well.)   The day before your exam, drink extra fluids at least six 8-ounce glasses (unless your doctor tells you to restrict your fluids).  Patients over 70 or patients with diabetes or kidney problems:   If you haven t had a blood test (creatinine test) within the last 30 days, go to your clinic or Diagnostic Imaging Department for this test.  If you have diabetes:   If your kidney function is normal, continue taking your metformin (Avandamet, Glucophage, Glucovance, Metaglip) on the day of your exam.   If your kidney function is abnormal, wait 48 hours before restarting this medicine.  You will have oral contrast for this exam:   You will drink the contrast at home. Get this from your clinic or Diagnostic Imaging Department. Please follow the directions given.  Please wear loose clothing, such as a sweat suit or jogging clothes. Avoid snaps, zippers and other metal. We may ask you to undress and put on a hospital gown.  If you have any questions, please call the Imaging Department where you will have your exam.            Jul 10, 2017 12:30 PM CDT   (Arrive by 12:15 PM)   Return Visit with Kadi Dee MD   [facility] ([facility])    [address]  2nd [facility] [id number] [phone]            Jul 10, 2017   1:00 PM CDT   Infusion 240 with UC ONCOLOGY INFUSION, UC 10 ATC   [facility] ([facility])    [address]  2nd Floor  [facility] [address] [phone]              Who to contact     If you have questions or need follow up information about today's clinic visit or your schedule please contact [facility] CANCER [facility] directly at [phone].  Normal or non-critical lab and imaging results will be communicated to you by [employer], letter or phone within 4 business days after the clinic has received the results. If you do not hear from us within 7 days, please contact the clinic through [employer] or phone. If you have a critical or abnormal lab result, we will notify you by phone as soon as possible.  Submit refill requests through [employer] or call your pharmacy and they will forward the refill request to us. Please allow 3 business days for your refill to be completed.          Additional Information About Your Visit        [employer] Information     [employer] gives you secure access to your electronic health record. If you see a primary care provider, you can also send messages to your care team and make appointments. If you have questions, please call your primary care clinic.  If you do not have a primary care provider, please call [phone] and they will assist you.        Care EveryWhere ID     This is your Care EveryWhere ID. This could be used by other organizations to access your [place] medical records  [id number]        Your Vitals Were     Pulse Temperature Pulse Oximetry             66 97.9  F (36.6  C) (Oral) 99%          Blood Pressure from Last 3 Encounters:   06/29/17 133/67   06/28/17 124/79   06/27/17 132/80    Weight from Last 3 Encounters:   06/27/17 (!) 166.9 kg (367 lb 14.4 oz)   06/12/17 (!) 163.9 kg (361 lb 6.4 oz)   05/30/17 (!) 165.8 kg (365 lb 9.6 oz)              Today, you had the following     No orders found for display        Primary Care Provider    Unknown Primary MD Sydney       No address on file        Equal Access to Services     OLLIE MCDOWELLMANUELA : Hadii aad hunter harleen Dunlap, yeyo renaleksandra, nancy junojeromejuancarlos serranojamijuancarlos, waxrachel idiin haylanotilio dhillonlauryn mertpalthao barrlanotilio cardenas. So [facility] [phone].    ATENCIÓN: Si habla español, tiene a alamo disposición servicios gratuitos de asistencia lingüística. Ll[facility] [phone].    We comply with applicable federal civil rights laws and [place] laws. We do not discriminate on the basis of race, color, national origin, age, disability sex, sexual orientation or gender identity.            Thank you!     Thank you for choosing [facility] CANCER CLINIC  for your care. Our goal is always to provide you with excellent care. Hearing back from our patients is one way we can continue to improve our services. Please take a few minutes to complete the written survey that you may receive in the mail after your visit with us. Thank you!             Your Updated Medication List - Protect others around you: Learn how to safely use, store and throw away your medicines at www.disposemymeds.org.          This list is accurate as of: 6/29/17  3:42 PM.  Always use your most recent med list.                   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)       LORazepam 0.5 MG tablet    ATIVAN    60 tablet    Take 1 tablet (0.5 mg) by mouth every 6 hours as needed for anxiety    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), 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        Multi-vitamin Tabs tablet      Take 1 tablet by mouth daily        ondansetron 8 MG tablet    ZOFRAN    30 tablet    Take 1 tablet (8 mg) by mouth every 8 hours as needed (Nausea/Vomiting)    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)       prochlorperazine 10 MG tablet    COMPAZINE    30 tablet    Take 1 tablet (10 mg) by mouth every 6 hours as needed (Nausea/Vomiting)    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)       timolol 0.5 % ophthalmic solution    TIMOPTIC     Place into both eyes daily    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)       zolpidem 5 MG tablet    AMBIEN    60 tablet    Take 1-2 tablets each evening as needed    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), 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

## 2017-06-30 LAB — INTERPRETATION ECG - MUSE: NORMAL

## 2017-07-03 ENCOUNTER — TELEPHONE (OUTPATIENT)
Dept: ONCOLOGY | Facility: CLINIC | Age: 57
End: 2017-07-03

## 2017-07-07 NOTE — PROGRESS NOTES
This is a recent snapshot of the patient's [facility] medical record.  For current drug dose and complete information and questions, call [phone] or In Basket pool, [facility] home infusion ([id number])  CSN Number:  [id number]

## 2017-07-09 ASSESSMENT — ENCOUNTER SYMPTOMS
FEVER: 0
MYALGIAS: 1
DIZZINESS: 1
SLEEP DISTURBANCES DUE TO BREATHING: 0
TASTE DISTURBANCE: 1
BOWEL INCONTINENCE: 0
BLOOD IN STOOL: 0
NUMBNESS: 1
VOMITING: 1
EYE IRRITATION: 1
SPEECH CHANGE: 0
BACK PAIN: 1
LIGHT-HEADEDNESS: 1
EYE REDNESS: 1
CLAUDICATION: 1
DECREASED APPETITE: 0
HALLUCINATIONS: 0
RECTAL PAIN: 0
MEMORY LOSS: 1
ORTHOPNEA: 0
SYNCOPE: 0
DISTURBANCES IN COORDINATION: 1
MUSCLE CRAMPS: 1
POLYPHAGIA: 0
WEAKNESS: 0
CONSTIPATION: 1
HOARSE VOICE: 0
TACHYCARDIA: 0
SORE THROAT: 0
INCREASED ENERGY: 0
NECK PAIN: 0
PALPITATIONS: 0
EYE WATERING: 0
EYE PAIN: 0
HYPOTENSION: 0
ALTERED TEMPERATURE REGULATION: 1
FATIGUE: 1
BLOATING: 0
NAIL CHANGES: 0
SKIN CHANGES: 0
TROUBLE SWALLOWING: 1
NAUSEA: 1
WEIGHT LOSS: 1
ABDOMINAL PAIN: 0
MUSCLE WEAKNESS: 0
JOINT SWELLING: 1
SEIZURES: 0
HEADACHES: 0
NIGHT SWEATS: 0
RECTAL BLEEDING: 0
SINUS PAIN: 0
DOUBLE VISION: 1
NECK MASS: 0
HYPERTENSION: 0
POLYDIPSIA: 0
LOSS OF CONSCIOUSNESS: 0
CHILLS: 1
ARTHRALGIAS: 0
DIARRHEA: 1
EXERCISE INTOLERANCE: 0
JAUNDICE: 0
LEG SWELLING: 1
TINGLING: 1
TREMORS: 0
PARALYSIS: 0
SINUS CONGESTION: 0
STIFFNESS: 0
SMELL DISTURBANCE: 0
POOR WOUND HEALING: 0
WEIGHT GAIN: 0
LEG PAIN: 0

## 2017-07-10 ENCOUNTER — HOME INFUSION (PRE-WILLOW HOME INFUSION) (OUTPATIENT)
Dept: PHARMACY | Facility: CLINIC | Age: 57
End: 2017-07-10

## 2017-07-10 ENCOUNTER — INFUSION THERAPY VISIT (OUTPATIENT)
Dept: ONCOLOGY | Facility: CLINIC | Age: 57
End: 2017-07-10
Attending: INTERNAL MEDICINE
Payer: COMMERCIAL

## 2017-07-10 VITALS
BODY MASS INDEX: 42.02 KG/M2 | OXYGEN SATURATION: 96 % | DIASTOLIC BLOOD PRESSURE: 80 MMHG | SYSTOLIC BLOOD PRESSURE: 133 MMHG | RESPIRATION RATE: 16 BRPM | TEMPERATURE: 98 F | WEIGHT: 315 LBS | HEART RATE: 80 BPM

## 2017-07-10 DIAGNOSIS — C15.5 CANCER OF DISTAL THIRD OF ESOPHAGUS (H): ICD-10-CM

## 2017-07-10 DIAGNOSIS — I26.99 OTHER PULMONARY EMBOLISM WITHOUT ACUTE COR PULMONALE (H): Primary | ICD-10-CM

## 2017-07-10 DIAGNOSIS — G47.09 OTHER INSOMNIA: ICD-10-CM

## 2017-07-10 DIAGNOSIS — C77.0 METASTASIS TO HEAD AND NECK LYMPH NODE (H): ICD-10-CM

## 2017-07-10 DIAGNOSIS — C15.5 CANCER OF DISTAL THIRD OF ESOPHAGUS (H): Primary | ICD-10-CM

## 2017-07-10 LAB
ALBUMIN SERPL-MCNC: 3.2 G/DL (ref 3.4–5)
ALP SERPL-CCNC: 98 U/L (ref 40–150)
ALT SERPL W P-5'-P-CCNC: 24 U/L (ref 0–70)
ANION GAP SERPL CALCULATED.3IONS-SCNC: 8 MMOL/L (ref 3–14)
AST SERPL W P-5'-P-CCNC: 23 U/L (ref 0–45)
BASOPHILS # BLD AUTO: 0 10E9/L (ref 0–0.2)
BASOPHILS NFR BLD AUTO: 0.5 %
BILIRUB SERPL-MCNC: 0.7 MG/DL (ref 0.2–1.3)
BUN SERPL-MCNC: 14 MG/DL (ref 7–30)
CALCIUM SERPL-MCNC: 8.7 MG/DL (ref 8.5–10.1)
CHLORIDE SERPL-SCNC: 110 MMOL/L (ref 94–109)
CO2 SERPL-SCNC: 24 MMOL/L (ref 20–32)
CREAT SERPL-MCNC: 0.77 MG/DL (ref 0.66–1.25)
DIFFERENTIAL METHOD BLD: ABNORMAL
EOSINOPHIL # BLD AUTO: 0 10E9/L (ref 0–0.7)
EOSINOPHIL NFR BLD AUTO: 0.9 %
ERYTHROCYTE [DISTWIDTH] IN BLOOD BY AUTOMATED COUNT: 17.2 % (ref 10–15)
GFR SERPL CREATININE-BSD FRML MDRD: ABNORMAL ML/MIN/1.7M2
GLUCOSE SERPL-MCNC: 104 MG/DL (ref 70–99)
HCT VFR BLD AUTO: 40.8 % (ref 40–53)
HGB BLD-MCNC: 13.7 G/DL (ref 13.3–17.7)
IMM GRANULOCYTES # BLD: 0 10E9/L (ref 0–0.4)
IMM GRANULOCYTES NFR BLD: 0.5 %
LYMPHOCYTES # BLD AUTO: 1.1 10E9/L (ref 0.8–5.3)
LYMPHOCYTES NFR BLD AUTO: 24.5 %
MCH RBC QN AUTO: 29.8 PG (ref 26.5–33)
MCHC RBC AUTO-ENTMCNC: 33.6 G/DL (ref 31.5–36.5)
MCV RBC AUTO: 89 FL (ref 78–100)
MONOCYTES # BLD AUTO: 0.4 10E9/L (ref 0–1.3)
MONOCYTES NFR BLD AUTO: 8.6 %
NEUTROPHILS # BLD AUTO: 2.8 10E9/L (ref 1.6–8.3)
NEUTROPHILS NFR BLD AUTO: 65 %
NRBC # BLD AUTO: 0 10*3/UL
NRBC BLD AUTO-RTO: 0 /100
PLATELET # BLD AUTO: 81 10E9/L (ref 150–450)
POTASSIUM SERPL-SCNC: 3.9 MMOL/L (ref 3.4–5.3)
PROT SERPL-MCNC: 7 G/DL (ref 6.8–8.8)
RBC # BLD AUTO: 4.59 10E12/L (ref 4.4–5.9)
SODIUM SERPL-SCNC: 141 MMOL/L (ref 133–144)
WBC # BLD AUTO: 4.3 10E9/L (ref 4–11)

## 2017-07-10 PROCEDURE — 25000128 H RX IP 250 OP 636: Mod: ZF | Performed by: INTERNAL MEDICINE

## 2017-07-10 PROCEDURE — 96413 CHEMO IV INFUSION 1 HR: CPT

## 2017-07-10 PROCEDURE — 85025 COMPLETE CBC W/AUTO DIFF WBC: CPT | Performed by: INTERNAL MEDICINE

## 2017-07-10 PROCEDURE — 96368 THER/DIAG CONCURRENT INF: CPT

## 2017-07-10 PROCEDURE — 96416 CHEMO PROLONG INFUSE W/PUMP: CPT

## 2017-07-10 PROCEDURE — 80053 COMPREHEN METABOLIC PANEL: CPT | Performed by: INTERNAL MEDICINE

## 2017-07-10 PROCEDURE — 99212 OFFICE O/P EST SF 10 MIN: CPT | Mod: ZF

## 2017-07-10 PROCEDURE — 25000125 ZZHC RX 250: Mod: ZF | Performed by: INTERNAL MEDICINE

## 2017-07-10 PROCEDURE — 96411 CHEMO IV PUSH ADDL DRUG: CPT

## 2017-07-10 PROCEDURE — 36415 COLL VENOUS BLD VENIPUNCTURE: CPT | Performed by: INTERNAL MEDICINE

## 2017-07-10 PROCEDURE — 99214 OFFICE O/P EST MOD 30 MIN: CPT | Mod: ZP | Performed by: INTERNAL MEDICINE

## 2017-07-10 PROCEDURE — 96367 TX/PROPH/DG ADDL SEQ IV INF: CPT

## 2017-07-10 PROCEDURE — 96375 TX/PRO/DX INJ NEW DRUG ADDON: CPT

## 2017-07-10 PROCEDURE — 96415 CHEMO IV INFUSION ADDL HR: CPT

## 2017-07-10 RX ORDER — PALONOSETRON 0.05 MG/ML
0.25 INJECTION, SOLUTION INTRAVENOUS ONCE
Status: CANCELLED | OUTPATIENT
Start: 2017-07-10

## 2017-07-10 RX ORDER — LORAZEPAM 2 MG/ML
0.5 INJECTION INTRAMUSCULAR EVERY 4 HOURS PRN
Status: CANCELLED
Start: 2017-07-10

## 2017-07-10 RX ORDER — ALBUTEROL SULFATE 90 UG/1
1-2 AEROSOL, METERED RESPIRATORY (INHALATION)
Status: CANCELLED
Start: 2017-07-10

## 2017-07-10 RX ORDER — FLUOROURACIL 50 MG/ML
400 INJECTION, SOLUTION INTRAVENOUS ONCE
Status: CANCELLED | OUTPATIENT
Start: 2017-07-10

## 2017-07-10 RX ORDER — MEPERIDINE HYDROCHLORIDE 25 MG/ML
25 INJECTION INTRAMUSCULAR; INTRAVENOUS; SUBCUTANEOUS EVERY 30 MIN PRN
Status: CANCELLED | OUTPATIENT
Start: 2017-07-10

## 2017-07-10 RX ORDER — SODIUM CHLORIDE 9 MG/ML
1000 INJECTION, SOLUTION INTRAVENOUS CONTINUOUS PRN
Status: CANCELLED
Start: 2017-07-10

## 2017-07-10 RX ORDER — DIPHENHYDRAMINE HYDROCHLORIDE 50 MG/ML
50 INJECTION INTRAMUSCULAR; INTRAVENOUS
Status: CANCELLED
Start: 2017-07-10

## 2017-07-10 RX ORDER — FLUOROURACIL 50 MG/ML
400 INJECTION, SOLUTION INTRAVENOUS ONCE
Status: COMPLETED | OUTPATIENT
Start: 2017-07-10 | End: 2017-07-10

## 2017-07-10 RX ORDER — ZOLPIDEM TARTRATE 5 MG/1
TABLET ORAL
Qty: 60 TABLET | Refills: 1 | Status: SHIPPED | OUTPATIENT
Start: 2017-07-10 | End: 2017-07-25

## 2017-07-10 RX ORDER — HEPARIN SODIUM (PORCINE) LOCK FLUSH IV SOLN 100 UNIT/ML 100 UNIT/ML
500 SOLUTION INTRAVENOUS EVERY 8 HOURS
Status: DISCONTINUED | OUTPATIENT
Start: 2017-07-10 | End: 2017-07-12 | Stop reason: HOSPADM

## 2017-07-10 RX ORDER — PALONOSETRON 0.05 MG/ML
0.25 INJECTION, SOLUTION INTRAVENOUS ONCE
Status: COMPLETED | OUTPATIENT
Start: 2017-07-10 | End: 2017-07-10

## 2017-07-10 RX ORDER — ALBUTEROL SULFATE 0.83 MG/ML
2.5 SOLUTION RESPIRATORY (INHALATION)
Status: CANCELLED | OUTPATIENT
Start: 2017-07-10

## 2017-07-10 RX ORDER — EPINEPHRINE 0.3 MG/.3ML
0.3 INJECTION SUBCUTANEOUS EVERY 5 MIN PRN
Status: CANCELLED | OUTPATIENT
Start: 2017-07-10

## 2017-07-10 RX ORDER — METHYLPREDNISOLONE SODIUM SUCCINATE 125 MG/2ML
125 INJECTION, POWDER, LYOPHILIZED, FOR SOLUTION INTRAMUSCULAR; INTRAVENOUS
Status: CANCELLED
Start: 2017-07-10

## 2017-07-10 RX ADMIN — SODIUM CHLORIDE, PRESERVATIVE FREE 500 UNITS: 5 INJECTION INTRAVENOUS at 09:05

## 2017-07-10 RX ADMIN — OXALIPLATIN 250 MG: 5 INJECTION, SOLUTION, CONCENTRATE INTRAVENOUS at 14:18

## 2017-07-10 RX ADMIN — LEUCOVORIN CALCIUM 1050 MG: 500 INJECTION, POWDER, LYOPHILIZED, FOR SOLUTION INTRAMUSCULAR; INTRAVENOUS at 14:18

## 2017-07-10 RX ADMIN — DEXTROSE MONOHYDRATE 250 ML: 50 INJECTION, SOLUTION INTRAVENOUS at 14:18

## 2017-07-10 RX ADMIN — DEXAMETHASONE SODIUM PHOSPHATE: 10 INJECTION, SOLUTION INTRAMUSCULAR; INTRAVENOUS at 13:36

## 2017-07-10 RX ADMIN — PALONOSETRON HYDROCHLORIDE 0.25 MG: 0.25 INJECTION INTRAVENOUS at 13:36

## 2017-07-10 RX ADMIN — FLUOROURACIL 1220 MG: 50 INJECTION, SOLUTION INTRAVENOUS at 16:19

## 2017-07-10 RX ADMIN — SODIUM CHLORIDE 150 MG: 9 INJECTION, SOLUTION INTRAVENOUS at 13:52

## 2017-07-10 ASSESSMENT — PAIN SCALES - GENERAL: PAINLEVEL: NO PAIN (0)

## 2017-07-10 NOTE — MR AVS SNAPSHOT
After Visit Summary   7/10/2017    Reuben Padilla    MRN: [id number]           Patient Information     Date Of Birth          1960        Visit Information        Provider Department      7/10/2017 1:00 PM  10 ATC;  ONCOLOGY INFUSION [facility]        Today's Diagnoses     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)    -  1      Care Instructions    Contact Numbers    [facility] Main Line: [phone]  [facility] Triage:  [phone]    Call triage with chills and/or temperature greater than or equal to 100.5, uncontrolled nausea/vomiting, diarrhea, constipation, dizziness, shortness of breath, chest pain, bleeding, unexplained bruising, or any new/concerning symptoms, questions/concerns.     If you are having any concerning symptoms or wish to speak to a provider before your next infusion visit, please call your care coordinator or triage to notify them so we can adequately serve you.       After Hours: [phone]    If after hours, weekends, or holidays, call main hospital  and ask for Oncology doctor on call.         July 2017 Sunday Monday Tuesday Wednesday Thursday Friday Saturday                                 1       2     3     4     5     6     7     8       9     10     [facility] LAB DRAW    9:00 AM   (15 min.)    [facility] LAB DRAW   [facility] Lab Draw     CT CHEST/ABDOMEN/PELVIS W    9:25 AM   (20 min.)   UCCT1   [facility] CT     [facility] RETURN   11:15 AM   (30 min.)   Kadi Dee MD   [facility] ONC INFUSION 240    1:00 PM   (240 min.)    ONCOLOGY INFUSION   [facility] 11     12     13     14     15       16     17     18     19     20     21     22       23     24     25     [facility] DRAW    8:00 AM   (15 min.)    [facility]     [facility] RETURN    8:15 AM   (50 min.)   Katalina Ramirez PA-C   [facility] ONC  INFUSION 240    9:30 AM   (240 min.)   [facility] ONCOLOGY INFUSION   [facility] 26     27     28     29       30     31 August 2017 Sunday Monday Tuesday Wednesday Thursday Friday Saturday             1     2     3     4     5       6     7     [facility] DRAW    8:00 AM   (15 min.)   [facility]     [facility] RETURN    8:15 AM   (30 min.)   Kadi Dee MD   [facility]     [facility] ONC INFUSION 240   10:00 AM   (240 min.)   UC ONCOLOGY INFUSION   [facility] 8     9     10     11     12       13     14     15     16     17     18     19       20     21     [facility] NEW ATRIAL FIBRILLATION    3:45 PM   (40 min.)   Romero Guerrero MD   [facility] 22     [facility] DRAW    8:00 AM   (15 min.)    [facility] LAB DRAW   [facility] Lab Draw     [facility] RETURN    8:15 AM   (50 min.)   [facility]-C   [facility]    9:30 AM   (240 min.)    ONCOLOGY INFUSION   [facility] 23     24     25     26       27     28     29     30     31  Happy Birthday!                            Lab Results:  Recent Results (from the past 12 hour(s))   CBC with platelets differential    Collection Time: 07/10/17  9:14 AM   Result Value Ref Range    WBC 4.3 4.0 - 11.0 10e9/L    RBC Count 4.59 4.4 - 5.9 10e12/L    Hemoglobin 13.7 13.3 - 17.7 g/dL    Hematocrit 40.8 40.0 - 53.0 %    MCV 89 78 - 100 fl    MCH 29.8 26.5 - 33.0 pg    MCHC 33.6 31.5 - 36.5 g/dL    RDW 17.2 (H) 10.0 - 15.0 %    Platelet Count 81 (L) 150 - 450 10e9/L    Diff Method Automated Method     % Neutrophils 65.0 %    % Lymphocytes 24.5 %    % Monocytes 8.6 %    % Eosinophils 0.9 %    % Basophils 0.5 %    % Immature Granulocytes 0.5 %    Nucleated RBCs 0 0 /100    Absolute Neutrophil 2.8 1.6 - 8.3 10e9/L    Absolute Lymphocytes 1.1 0.8 - 5.3 10e9/L     Absolute Monocytes 0.4 0.0 - 1.3 10e9/L    Absolute Eosinophils 0.0 0.0 - 0.7 10e9/L    Absolute Basophils 0.0 0.0 - 0.2 10e9/L    Abs Immature Granulocytes 0.0 0 - 0.4 10e9/L    Absolute Nucleated RBC 0.0    Comprehensive metabolic panel    Collection Time: 07/10/17  9:14 AM   Result Value Ref Range    Sodium 141 133 - 144 mmol/L    Potassium 3.9 3.4 - 5.3 mmol/L    Chloride 110 (H) 94 - 109 mmol/L    Carbon Dioxide 24 20 - 32 mmol/L    Anion Gap 8 3 - 14 mmol/L    Glucose 104 (H) 70 - 99 mg/dL    Urea Nitrogen 14 7 - 30 mg/dL    Creatinine 0.77 0.66 - 1.25 mg/dL    GFR Estimate >90  Non  GFR Calc   >60 mL/min/1.7m2    GFR Estimate If Black >90   GFR Calc   >60 mL/min/1.7m2    Calcium 8.7 8.5 - 10.1 mg/dL    Bilirubin Total 0.7 0.2 - 1.3 mg/dL    Albumin 3.2 (L) 3.4 - 5.0 g/dL    Protein Total 7.0 6.8 - 8.8 g/dL    Alkaline Phosphatase 98 40 - 150 U/L    ALT 24 0 - 70 U/L    AST 23 0 - 45 U/L   CT Chest/Abdomen/Pelvis w Contrast    Collection Time: 07/10/17  9:44 AM   Result Value Ref Range    Radiologist flags Pulmonary embolism (Urgent)                Follow-ups after your visit        Your next 10 appointments already scheduled     Jul 25, 2017  8:00 AM CDT   [facility] Lab Draw with [facility] LAB DRAW   [facility] Lab Draw ([facility])    [address] [address] [phone]            Jul 25, 2017  8:30 AM CDT   (Arrive by 8:15 AM)   Return Visit with Katalina Ramirez PA-C   [facility] ([facility])    [address] [address] [phone]            Jul 25, 2017  9:30 AM CDT   Infusion 240 with  ONCOLOGY INFUSION,  16 ATC   [facility])    [address] [facility]  2nd [facility] [address]   [phone]            Aug 07, 2017  8:00 AM CDT   [facility] Lab Draw  with  [facility] LAB DRAW   [facility] Lab Draw ([facility])    [address] [facility]  2nd Floor  [facility] [address] [phone]            Aug 07, 2017  8:30 AM CDT   (Arrive by 8:15 AM)   Return Visit with Kadi Dee MD   [facility] ([facility])    [address] [facility]  2nd Floor  [facility] [address] [phone]            Aug 07, 2017 10:00 AM CDT   Infusion 240 with UC ONCOLOGY INFUSION, UC 17 ATC   [facility] ([facility])    [address] [facility]  2nd Floor  [facility] [address] [phone]            Aug 21, 2017  4:00 PM CDT   (Arrive by 3:45 PM)   ATRIAL FIBULATION VISIT with Romero Guerrero MD   [facility] ([facility])    [address] [facility]  3rd Floor  [facility] [address] [phone]            Aug 22, 2017  8:00 AM CDT   [facility] Lab Draw with  [facility] LAB DRAW   [facility] Lab Draw ([facility])    [address] [facility]  2nd [facility] [id number] [phone]              Who to contact     If you have questions or need follow up information about today's clinic visit or your schedule please contact [facility] directly at [phone].  Normal or non-critical lab and imaging results will be communicated to you by MyChart, letter or phone within 4 business days after the clinic has received the results. If you do not hear from us within 7 days, please contact the clinic through Myriohart or phone. If you have a critical or abnormal lab result, we will notify you by phone as soon as possible.  Submit refill requests through [employer] How To or call your pharmacy and they will forward the refill request to us. Please allow 3 business days for your refill to be completed.          Additional Information About Your Visit        MyChart Information     [facility]  gives you secure access to your electronic health record. If you see a primary care provider, you can also send messages to your care team and make appointments. If you have questions, please call your primary care clinic.  If you do not have a primary care provider, please call [phone] and they will assist you.        Care EveryWhere ID     This is your Care EveryWhere ID. This could be used by other organizations to access your [facility] medical records  [id number]         Blood Pressure from Last 3 Encounters:   07/10/17 133/80   06/29/17 133/67   06/28/17 124/79    Weight from Last 3 Encounters:   07/10/17 (!) 164.9 kg (363 lb 8 oz)   06/27/17 (!) 166.9 kg (367 lb 14.4 oz)   06/12/17 (!) 163.9 kg (361 lb 6.4 oz)              We Performed the Following     CBC with platelets differential     Comprehensive metabolic panel          Today's Medication Changes          These changes are accurate as of: 7/10/17  4:28 PM.  If you have any questions, ask your nurse or doctor.               Start taking these medicines.        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   Started by:  Kadi Dee MD        Dose:  15 mg   Take 15 mg by mouth 2 times daily For 21 days and then 20mg daily   Quantity:  51 each   Refills:  0            Where to get your medicines      These medications were sent to [facility] [place], [place] - [address] [facility] [address] [address]    Hours:  TRANSPLANT PHONE NUMBER [phone] Phone:  [phone]     Rivaroxaban 15 & 20 MG Tbpk         Some of these will need a paper prescription and others can be bought over the counter.  Ask your nurse if you have questions.     Bring a paper prescription for each  of these medications     zolpidem 5 MG tablet                Primary Care Provider    Unknown Primary MD Sydney       No address on file        Equal Access to Services     OLLIE SHARPE : Hadii antonio harrison harleen Dunlap, dillonda luangelita, nancy sotelo, geraldo johnnyin hayaan jacquielauryn munson lasantyotilio cardenas. So [facility] [phone].    ATENCIÓN: Si habla español, tiene a alamo disposición servicios gratuitos de asistencia lingüística. Llame al [phone].    We comply with applicable federal civil rights laws and [place] laws. We do not discriminate on the basis of race, color, national origin, age, disability sex, sexual orientation or gender identity.            Thank you!     Thank you for choosing [facility]  for your care. Our goal is always to provide you with excellent care. Hearing back from our patients is one way we can continue to improve our services. Please take a few minutes to complete the written survey that you may receive in the mail after your visit with us. Thank you!             Your Updated Medication List - Protect others around you: Learn how to safely use, store and throw away your medicines at www.disposemymeds.org.          This list is accurate as of: 7/10/17  4:28 PM.  Always use your most recent med list.                   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)       LORazepam 0.5 MG tablet    ATIVAN    60 tablet    Take 1 tablet (0.5 mg) by mouth every 6 hours as needed for anxiety    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),  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       Multi-vitamin Tabs tablet      Take 1 tablet by mouth daily        ondansetron 8 MG tablet    ZOFRAN    30 tablet    Take 1 tablet (8 mg) by mouth every 8 hours as needed (Nausea/Vomiting)    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)       prochlorperazine 10 MG tablet    COMPAZINE    30 tablet    Take 1 tablet (10 mg) by mouth every 6 hours as needed (Nausea/Vomiting)    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)       Rivaroxaban 15 & 20 MG Tbpk    XARELTO STARTER PACK    51 each    Take 15 mg by mouth 2 times daily For 21 days and then 20mg daily    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), 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

## 2017-07-10 NOTE — LETTER
7/10/2017       RE: Reuben Padilla  [address]   [place] [address]     Dear Colleague,    Thank you for referring your patient, Reuben Padilla, to the [facility]. Please see a copy of my visit note below.    Oncology Visit:  Date on this visit: July 10, 2017     HISTORY OF PRESENT ILLNESS:  The patient reports that initially he noticed symptoms of dysphagia, mainly to solid food, starting in February of 2017.  Symptoms got progressively worse.  Currently, he is unable to eat solid food, and he needs to take a lot of fluid to push food down.  His dysphagia symptoms prompted subsequent evaluation with upper endoscopy and colonoscopy that was done in [place]. The colonoscopy revealed two tubular adenomas in the colon.  The upper endoscopy revealed an ulcerated mass 1-2 cm long, approximately 40 cm from the lips.  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  The patient also had a PET/CT scan done that revealed widespread lavon metastasis and multiple liver mets that were consistent with metastatic disease. Her 2 negative.      He returns today after four cycles of FOLFOX chemotherapy.  He is feeling much better.  He is eating much better and is no longer having dysphagia.  He has had some difficulty with nausea and constipation during his chemotherapy.  With the addition of Emend, he is doing much better.    He has grade 1 neuropathy.  He continues to work FT in [employer].  He reports fatigue days 4-5 and wants to rest most of these days.       ROS: 10 point ROS neg other than the symptoms noted above in the HPI.       His past medical history is significant only for isolated intermittent atrial fibrillation for which he is intermittently taking a baby aspirin.  He denies any other significant past medical history.  He considers himself to be relatively healthy.      SOCIAL HISTORY:  He has a remote history of  smoking, about 20-pack years; he quit 10 years ago.  He denies alcohol or illicit drugs.  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  He has a sister who lives in the city, and he has a 22-year-old son.      REVIEW OF SYSTEMS:  A complete review of systems is negative, except as documented above.            Past Medical/Surgical History:  Past Medical History:   Diagnosis Date     Atrial fibrillation (H)      Cancer (H)     esophageal     Glaucoma      Glaucoma      Past Surgical History:   Procedure Laterality Date     AMPUTATION      toe     ARTHROSCOPY KNEE       bunion surgery       CHOLECYSTECTOMY       INSERT PORT VASCULAR ACCESS Right 5/9/2017    Procedure: INSERT PORT VASCULAR ACCESS;  Single Lumen Chest Power Port;  Surgeon: Jasiel Hu PA-C;  [facility]   Allergies:  Allergies as of 07/10/2017 - David as Reviewed 07/10/2017   Allergen Reaction Noted     Penicillin g Other (See Comments) 01/19/2017     Penicillins Unknown 04/27/2017     Current Medications:  Current Outpatient Prescriptions   Medication Sig Dispense Refill     zolpidem (AMBIEN) 5 MG tablet Take 1-2 tablets each evening as needed 60 tablet 1     Rivaroxaban (XARELTO STARTER PACK) 15 & 20 MG TBPK Take 15 mg by mouth 2 times daily For 21 days and then 20mg daily 51 each 0     lidocaine-prilocaine (EMLA) cream Apply topically as needed for moderate pain 30 g 3     LORazepam (ATIVAN) 0.5 MG tablet Take 1 tablet (0.5 mg) by mouth every 6 hours as needed for anxiety 60 tablet 1     multivitamin, therapeutic with minerals (MULTI-VITAMIN) TABS tablet Take 1 tablet by mouth daily       fish oil-omega-3 fatty acids 1000 MG capsule Take 2 g by mouth daily       IBUPROFEN PO Take 800 mg by mouth every 8 hours as needed for moderate pain       latanoprost (XALATAN) 0.005 % ophthalmic solution Place into both eyes At Bedtime       timolol (TIMOPTIC) 0.5 % ophthalmic solution Place into both eyes daily       ondansetron (ZOFRAN) 8 MG  tablet Take 1 tablet (8 mg) by mouth every 8 hours as needed (Nausea/Vomiting) (Patient not taking: Reported on 7/10/2017) 30 tablet 2     prochlorperazine (COMPAZINE) 10 MG tablet Take 1 tablet (10 mg) by mouth every 6 hours as needed (Nausea/Vomiting) (Patient not taking: Reported on 6/28/2017) 30 tablet 2      Family History:    Maternal grandmother had breast cancer diagnosed at her 50's.Paternal aunt had some gynecologic cancer    Social History:  Social History     Social History     Marital status: Single     Spouse name: N/A     Number of children: N/A     Years of education: N/A     Occupational History     Not on file.     Social History Main Topics     Smoking status: Former Smoker     Smokeless tobacco: Never Used     Alcohol use Yes      Comment: occasional     Drug use: No      Comment: h/o over 30 years ago     Sexual activity: Not on file     Other Topics Concern     Not on file     Social History Narrative     Physical Exam:  /80 (BP Location: Right arm, Patient Position: Chair, Cuff Size: Adult Large)  Pulse 80  Temp 98  F (36.7  C) (Oral)  Resp 16  Wt (!) 164.9 kg (363 lb 8 oz)  SpO2 96%  BMI 42.02 kg/m2    GENERAL APPEARANCE: healthy, alert and no distress     HENT: Mouth without ulcers or lesions     NECK: no adenopathy, no asymmetry or masses     LYMPHATICS: No cervical, supraclavicular, axillary or inguinal lymphadenopathy appreciated     RESP: lungs clear to auscultation - no rales, rhonchi or wheezes     CARDIOVASCULAR: regular rates and rhythm, normal S1 S2, no S3 or S4 and no murmur.     ABDOMEN:  soft, nontender, no HSM or masses and bowel sounds normal     MUSCULOSKELETAL: extremities normal- no gross deformities noted, no evidence of inflammation in joints, FROM in all extremities. No edema b/l LE.     SKIN: no suspicious lesions or rashes     PSYCHIATRIC: mentation appears normal and affect normal  Lab Results   Component Value Date    WBC 4.3 07/10/2017     Lab Results    Component Value Date    RBC 4.59 07/10/2017     Lab Results   Component Value Date    HGB 13.7 07/10/2017     Lab Results   Component Value Date    HCT 40.8 07/10/2017     No components found for: MCT  Lab Results   Component Value Date    MCV 89 07/10/2017     Lab Results   Component Value Date    MCH 29.8 07/10/2017     Lab Results   Component Value Date    MCHC 33.6 07/10/2017     Lab Results   Component Value Date    RDW 17.2 07/10/2017     Lab Results   Component Value Date    PLT 81 07/10/2017       Recent Labs   Lab Test  07/10/17   0914  06/28/17   0950   NA  141  142   POTASSIUM  3.9  4.0   CHLORIDE  110*  108   CO2  24  25   ANIONGAP  8  8   GLC  104*  143*   BUN  14  11   CR  0.77  0.70   NIDHI  8.7  8.9     Liver Function Studies -   Recent Labs   Lab Test  07/10/17   0914   PROTTOTAL  7.0   ALBUMIN  3.2*   BILITOTAL  0.7   ALKPHOS  98   AST  23   ALT  24       ASSESSMENT/PLAN:  1.  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  This corresponds to stage IV cancer.      He is tolerating FOLFOX well and has had a good partial response to his treatment.  Will continue FOLFOX>  Will reduce oxaliplatin to 90% moving forward.  Neuropathy will need to be monitored closely.    2. Nausea -improved on Emend and decadron.    3.  A-Fib.  He seems to have intermittent a-fib. Currently asymptomatic and in sinus rhythm.  He is advised to continue aspirin.     4.  New incidental PE - discussed case with Dr Bland.  Mynor chavez.  Discussed risks/benefits of blood thinners.                Kadi Dee MD

## 2017-07-10 NOTE — MR AVS SNAPSHOT
After Visit Summary   7/10/2017    Reuben Padilla    MRN: [id number]           Patient Information     Date Of Birth          1960        Visit Information        Provider Department      7/10/2017 11:30 AM Kadi Dee MD [facility]        Today's Diagnoses     Other pulmonary embolism without acute cor pulmonale (H)    -  1    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)        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)        Other insomnia           Follow-ups after your visit        Your next 10 appointments already scheduled     Jul 25, 2017  8:00 AM CDT   Masonic Lab Draw with [facility] MASONIC LAB DRAW   [facility] Masonic Lab Draw ([facility])    [address]  [address] [facility] [address]   [phone]            Jul 25, 2017  8:30 AM CDT   (Arrive by 8:15 AM)   Return Visit with Katalina Ramirez PA-C   [facility] ([facility])    [address] [place] [address]  [address] [facility] [address]   [phone]            Jul 25, 2017  9:30 AM CDT   Infusion 240 with UC ONCOLOGY INFUSION, UC 16 ATC   [facility] ([facility])    [address]  [address] [facility] [address]   [phone]            Aug 07, 2017  8:00 AM CDT   Masonic Lab Draw with UC MASONIC LAB DRAW   [facility] Masonic Lab Draw ([facility])    [address]  [address] [facility] [address]   [phone]            Aug 07, 2017  8:30 AM CDT   (Arrive by 8:15 AM)   Return Visit with Kadi Dee MD   [facility] ([facility])    [address]  [address] [facility] [address]   [phone]            Aug 07, 2017 10:00 AM CDT   Infusion 240 with UC ONCOLOGY INFUSION, UC 17 ATC   [facility] ([facility])    [address] [place] [address]  [address]  [address] [facility] [id number] [phone]            Aug 21, 2017  4:00 PM CDT   (Arrive by 3:45 PM)   ATRIAL FIBULATION VISIT with Romero Guerrero MD   [facility] ([facility])    [address] [place] [address]  [address] [facility] [id number] [phone]            Aug 22, 2017  8:00 AM CDT   Masonic Lab Draw with  MASONIC LAB DRAW   [facility] Masonic Lab Draw ([facility])    [address] [id number] [phone]              Who to contact     If you have questions or need follow up information about today's clinic visit or your schedule please contact [facility] directly at [phone].  Normal or non-critical lab and imaging results will be communicated to you by MyChart, letter or phone within 4 business days after the clinic has received the results. If you do not hear from us within 7 days, please contact the clinic through [employer] or phone. If you have a critical or abnormal lab result, we will notify you by phone as soon as possible.  Submit refill requests through [employer] or call your pharmacy and they will forward the refill request to us. Please allow 3 business days for your refill to be completed.          Additional Information About Your Visit        MyChart Information     [employer] gives you secure access to your electronic health record. If you see a primary care provider, you can also send messages to your care team and make appointments. If you have questions, please call your primary care clinic.  If you do not have a primary care provider, please call [phone] and they will assist you.        Care EveryWhere ID     This is your Care EveryWhere ID. This could be used by other organizations to access your [place] medical records  [id number]        Your Vitals Were     Pulse Temperature Respirations Pulse Oximetry BMI (Body Mass Index)       80 98  F (36.7  C)  (Oral) 16 96% 42.02 kg/m2        Blood Pressure from Last 3 Encounters:   07/10/17 133/80   06/29/17 133/67   06/28/17 124/79    Weight from Last 3 Encounters:   07/10/17 (!) 164.9 kg (363 lb 8 oz)   06/27/17 (!) 166.9 kg (367 lb 14.4 oz)   06/12/17 (!) 163.9 kg (361 lb 6.4 oz)              Today, you had the following     No orders found for display         Today's Medication Changes          These changes are accurate as of: 7/10/17 11:59 PM.  If you have any questions, ask your nurse or doctor.               Start taking these medicines.        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   Started by:  Kadi Dee MD        Dose:  15 mg   Take 15 mg by mouth 2 times daily For 21 days and then 20mg daily   Quantity:  51 each   Refills:  0            Where to get your medicines      These medications were sent to [address] [address]    Hours:  TRANSPLANT PHONE NUMBER [phone] Phone:  [phone]     Rivaroxaban 15 & 20 MG Tbpk         Some of these will need a paper prescription and others can be bought over the counter.  Ask your nurse if you have questions.     Bring a paper prescription for each of these medications     zolpidem 5 MG tablet                Primary Care Provider    Unknown Primary MD Sydney       No address on file        Equal Access to Services     OLLIE SHARPE AH: Eliza Dunlap, yeyo luangelita, qaybjose luis kaalgeraldo glass. So [facility] [place] [phone].    ATENCIÓN: Si habla español, tiene a alamo disposición servicios gratuitos de asistencia lingüística. Llame al [phone].    We comply with applicable federal  civil rights laws and [place] laws. We do not discriminate on the basis of race, color, national origin, age, disability sex, sexual orientation or gender identity.            Thank you!     Thank you for choosing [facility]  for your care. Our goal is always to provide you with excellent care. Hearing back from our patients is one way we can continue to improve our services. Please take a few minutes to complete the written survey that you may receive in the mail after your visit with us. Thank you!             Your Updated Medication List - Protect others around you: Learn how to safely use, store and throw away your medicines at www.disposemymeds.org.          This list is accurate as of: 7/10/17 11:59 PM.  Always use your most recent med list.                   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)       LORazepam 0.5 MG tablet    ATIVAN    60 tablet    Take 1 tablet (0.5 mg) by mouth every 6 hours as needed for anxiety    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), 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       Multi-vitamin Tabs tablet      Take 1 tablet by mouth daily        ondansetron 8 MG tablet    ZOFRAN    30 tablet    Take 1 tablet (8 mg) by mouth every 8 hours as needed (Nausea/Vomiting)    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)       prochlorperazine 10 MG tablet    COMPAZINE    30 tablet    Take 1 tablet (10 mg) by mouth every 6 hours as needed (Nausea/Vomiting)    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)        Rivaroxaban 15 & 20 MG Tbpk    XARELTO STARTER PACK    51 each    Take 15 mg by mouth 2 times daily For 21 days and then 20mg daily    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), 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

## 2017-07-10 NOTE — NURSING NOTE
"Oncology Rooming Note    July 10, 2017 11:06 AM   Reuben Padilla is a 56 year old male who presents for:    Chief Complaint   Patient presents with     Port Draw     port accessed with 20 gauge 3/4 inch power needle, line flushed wtih NS and heparin. vitals taken      Oncology Clinic Visit     Esophageal Ca     Initial Vitals: /80 (BP Location: Right arm, Patient Position: Chair, Cuff Size: Adult Large)  Pulse 80  Temp 98  F (36.7  C) (Oral)  Resp 16  Wt (!) 164.9 kg (363 lb 8 oz)  SpO2 96%  BMI 42.02 kg/m2 Estimated body mass index is 42.02 kg/(m^2) as calculated from the following:    Height as of 6/27/17: 1.981 m (6' 5.99\").    Weight as of this encounter: 164.9 kg (363 lb 8 oz). Body surface area is 3.01 meters squared.  No Pain (0) Comment: Data Unavailable   No LMP for male patient.  Allergies reviewed: Yes  Medications reviewed: Yes    Medications: MEDICATION REFILLS NEEDED TODAY. Provider was NOT notified.  Pharmacy name entered into [employer]: [id number] - [place], [place] - [address]    Clinical concerns: Rx- Ambien needed    7 minutes for nursing intake (face to face time)     Marlena Cornelius LPN            "

## 2017-07-10 NOTE — PROGRESS NOTES
Infusion Nursing Note:  Patient presents today for Cycle 5 day 1 Leucovorin, Oxaliplatin, fluorouracil push and pump connect.    Patient seen by provider today: Yes: Dr. Dee    Note: N/A.    Intravenous Access:  Implanted Port.    Treatment Conditions:  Lab Results   Component Value Date    HGB 13.7 07/10/2017     Lab Results   Component Value Date    WBC 4.3 07/10/2017      Lab Results   Component Value Date    ANEU 2.8 07/10/2017     Lab Results   Component Value Date    PLT 81 07/10/2017      Lab Results   Component Value Date     07/10/2017                   Lab Results   Component Value Date    POTASSIUM 3.9 07/10/2017           No results found for: MAG         Lab Results   Component Value Date    CR 0.77 07/10/2017                   Lab Results   Component Value Date    NIDHI 8.7 07/10/2017                Lab Results   Component Value Date    BILITOTAL 0.7 07/10/2017           Lab Results   Component Value Date    ALBUMIN 3.2 07/10/2017                    Lab Results   Component Value Date    ALT 24 07/10/2017           Lab Results   Component Value Date    AST 23 07/10/2017     Results reviewed, labs MET treatment parameters, ok to proceed with treatment.    Post Infusion Assessment:  Patient tolerated infusion without incident.  Blood return noted pre and post infusion.  Blood return noted during administration every 2-3 cc during fluorouracil push.  Site patent and intact, free from redness, edema or discomfort.  No evidence of extravasations.    Prior to discharge: Port is secured in place with tegaderm and flushed with 10cc NS with positive blood return noted.  Continuous home infusion pump connected.    All connectors secured in place and clamps taped open.    Patient instructed to call our clinic or [facility] with any questions or concerns at home.  Patient verbalized understanding.    Patient set up for pump disconnect at home with [facility] on Wednesday at 1430.       Discharge Plan:   Prescription refills given for ambien, xarelto.  Discharge instructions reviewed with: Patient.  Patient and/or family verbalized understanding of discharge instructions and all questions answered.  Copy of AVS reviewed with patient and/or family.  Patient will return 7/25 for next appointment.  Patient discharged in stable condition accompanied by: self.  Departure Mode: Ambulatory.  Face to Face time: 2 minutes.    Paolo Mckeon RN.

## 2017-07-10 NOTE — PATIENT INSTRUCTIONS
Contact Numbers    [facility] Main Line: [phone]  [facility] Triage:  [phone]    Call triage with chills and/or temperature greater than or equal to 100.5, uncontrolled nausea/vomiting, diarrhea, constipation, dizziness, shortness of breath, chest pain, bleeding, unexplained bruising, or any new/concerning symptoms, questions/concerns.     If you are having any concerning symptoms or wish to speak to a provider before your next infusion visit, please call your care coordinator or triage to notify them so we can adequately serve you.       After Hours: [phone]    If after hours, weekends, or holidays, call main hospital  and ask for Oncology doctor on call.         July 2017 Sunday Monday Tuesday Wednesday Thursday Friday Saturday                                 1       2     3     4     5     6     7     8       9     10     P MASON[facility] LAB DRAW    9:00 AM   (15 min.)    [facility]RAW   [facility]     CT CHEST/ABDOMEN/PELVIS W    9:25 AM   (20 min.)   UCCT1   [facility] CT     [facility] RETURN   11:15 AM   (30 min.)   Kadi Dee MD   [facility] ONC INFUSION 240    1:00 PM   (240 min.)    ONCOLOGY INFUSION   [facility] 11     12     13     14     15       16     17     18     19     20     21     22       23     24     25     P [facility] LAB DRAW    8:00 AM   (15 min.)    [facility] DRAW   [facility]     [facility] RETURN    8:15 AM   (50 min.)   Katalina Ramirez PA-C   [facility] ONC INFUSION 240    9:30 AM   (240 min.)    ONCOLOGY INFUSION   [facility] 26     27     28     29       30     31 August 2017 Sunday Monday Tuesday Wednesday Thursday Friday Saturday             1     2     3     4     5       6     7     [facility] LAB DRAW    8:00 AM   (15 min.)    [facility] DRAW   [facility]      [facility] RETURN    8:15 AM   (30 min.)   Kadi Dee MD   [facility]     [facility] ONC INFUSION 240   10:00 AM   (240 min.)   UC ONCOLOGY INFUSION   [facility] 8     9     10     11     12       13     14     15     16     17     18     19       20     21     [facility] NEW ATRIAL FIBRILLATION    3:45 PM   (40 min.)   Romero Guerrero MD   [facility] 22     [facility] MASONIC LAB DRAW    8:00 AM   (15 min.)   UC [facility] LAB DRAW   [facility] Lab Draw     [facility] RETURN    8:15 AM   (50 min.)   Katalina Ramirez PA-C   [facility] ONC INFUSION 240    9:30 AM   (240 min.)    ONCOLOGY INFUSION   [facility] 23     24     25     26       27     28     29     30     31  Happy Birthday!                            Lab Results:  Recent Results (from the past 12 hour(s))   CBC with platelets differential    Collection Time: 07/10/17  9:14 AM   Result Value Ref Range    WBC 4.3 4.0 - 11.0 10e9/L    RBC Count 4.59 4.4 - 5.9 10e12/L    Hemoglobin 13.7 13.3 - 17.7 g/dL    Hematocrit 40.8 40.0 - 53.0 %    MCV 89 78 - 100 fl    MCH 29.8 26.5 - 33.0 pg    MCHC 33.6 31.5 - 36.5 g/dL    RDW 17.2 (H) 10.0 - 15.0 %    Platelet Count 81 (L) 150 - 450 10e9/L    Diff Method Automated Method     % Neutrophils 65.0 %    % Lymphocytes 24.5 %    % Monocytes 8.6 %    % Eosinophils 0.9 %    % Basophils 0.5 %    % Immature Granulocytes 0.5 %    Nucleated RBCs 0 0 /100    Absolute Neutrophil 2.8 1.6 - 8.3 10e9/L    Absolute Lymphocytes 1.1 0.8 - 5.3 10e9/L    Absolute Monocytes 0.4 0.0 - 1.3 10e9/L    Absolute Eosinophils 0.0 0.0 - 0.7 10e9/L    Absolute Basophils 0.0 0.0 - 0.2 10e9/L    Abs Immature Granulocytes 0.0 0 - 0.4 10e9/L    Absolute Nucleated RBC 0.0    Comprehensive metabolic panel    Collection Time: 07/10/17  9:14 AM   Result Value Ref Range    Sodium 141 133 - 144 mmol/L    Potassium 3.9 3.4 - 5.3 mmol/L    Chloride 110 (H) 94 - 109  mmol/L    Carbon Dioxide 24 20 - 32 mmol/L    Anion Gap 8 3 - 14 mmol/L    Glucose 104 (H) 70 - 99 mg/dL    Urea Nitrogen 14 7 - 30 mg/dL    Creatinine 0.77 0.66 - 1.25 mg/dL    GFR Estimate >90  Non  GFR Calc   >60 mL/min/1.7m2    GFR Estimate If Black >90   GFR Calc   >60 mL/min/1.7m2    Calcium 8.7 8.5 - 10.1 mg/dL    Bilirubin Total 0.7 0.2 - 1.3 mg/dL    Albumin 3.2 (L) 3.4 - 5.0 g/dL    Protein Total 7.0 6.8 - 8.8 g/dL    Alkaline Phosphatase 98 40 - 150 U/L    ALT 24 0 - 70 U/L    AST 23 0 - 45 U/L   CT Chest/Abdomen/Pelvis w Contrast    Collection Time: 07/10/17  9:44 AM   Result Value Ref Range    Radiologist flags Pulmonary embolism (Urgent)

## 2017-07-10 NOTE — NURSING NOTE
Chief Complaint   Patient presents with     Port Draw     port accessed with 20 gauge 3/4 inch power needle, line flushed wtih NS and heparin. vitals taken      Valentina Gamez RN

## 2017-07-12 ENCOUNTER — HOME INFUSION (PRE-WILLOW HOME INFUSION) (OUTPATIENT)
Dept: PHARMACY | Facility: CLINIC | Age: 57
End: 2017-07-12

## 2017-07-12 RX ORDER — ALBUTEROL SULFATE 90 UG/1
1-2 AEROSOL, METERED RESPIRATORY (INHALATION)
Status: CANCELLED
Start: 2017-07-25

## 2017-07-12 RX ORDER — SODIUM CHLORIDE 9 MG/ML
1000 INJECTION, SOLUTION INTRAVENOUS CONTINUOUS PRN
Status: CANCELLED
Start: 2017-07-25

## 2017-07-12 RX ORDER — DIPHENHYDRAMINE HYDROCHLORIDE 50 MG/ML
50 INJECTION INTRAMUSCULAR; INTRAVENOUS
Status: CANCELLED
Start: 2017-07-25

## 2017-07-12 RX ORDER — METHYLPREDNISOLONE SODIUM SUCCINATE 125 MG/2ML
125 INJECTION, POWDER, LYOPHILIZED, FOR SOLUTION INTRAMUSCULAR; INTRAVENOUS
Status: CANCELLED
Start: 2017-07-25

## 2017-07-12 RX ORDER — MEPERIDINE HYDROCHLORIDE 25 MG/ML
25 INJECTION INTRAMUSCULAR; INTRAVENOUS; SUBCUTANEOUS EVERY 30 MIN PRN
Status: CANCELLED | OUTPATIENT
Start: 2017-07-25

## 2017-07-12 RX ORDER — PALONOSETRON 0.05 MG/ML
0.25 INJECTION, SOLUTION INTRAVENOUS ONCE
Status: CANCELLED | OUTPATIENT
Start: 2017-07-25

## 2017-07-12 RX ORDER — EPINEPHRINE 0.3 MG/.3ML
0.3 INJECTION SUBCUTANEOUS EVERY 5 MIN PRN
Status: CANCELLED | OUTPATIENT
Start: 2017-07-25

## 2017-07-12 RX ORDER — LORAZEPAM 2 MG/ML
0.5 INJECTION INTRAMUSCULAR EVERY 4 HOURS PRN
Status: CANCELLED
Start: 2017-07-25

## 2017-07-12 RX ORDER — ALBUTEROL SULFATE 0.83 MG/ML
2.5 SOLUTION RESPIRATORY (INHALATION)
Status: CANCELLED | OUTPATIENT
Start: 2017-07-25

## 2017-07-12 RX ORDER — FLUOROURACIL 50 MG/ML
400 INJECTION, SOLUTION INTRAVENOUS ONCE
Status: CANCELLED | OUTPATIENT
Start: 2017-07-25

## 2017-07-12 NOTE — PROGRESS NOTES
Oncology Visit:  Date on this visit: July 10, 2017     HISTORY OF PRESENT ILLNESS:  The patient reports that initially he noticed symptoms of dysphagia, mainly to solid food, starting in February of 2017.  Symptoms got progressively worse.  Currently, he is unable to eat solid food, and he needs to take a lot of fluid to push food down.  His dysphagia symptoms prompted subsequent evaluation with upper endoscopy and colonoscopy that was done in [place]. The colonoscopy revealed two tubular adenomas in the colon.  The upper endoscopy revealed an ulcerated mass 1-2 cm long, approximately 40 cm from the lips.  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  The patient also had a PET/CT scan done that revealed widespread lavon metastasis and multiple liver mets that were consistent with metastatic disease. Her 2 negative.      He returns today after four cycles of FOLFOX chemotherapy.  He is feeling much better.  He is eating much better and is no longer having dysphagia.  He has had some difficulty with nausea and constipation during his chemotherapy.  With the addition of Emend, he is doing much better.    He has grade 1 neuropathy.  He continues to work FT in [place].  He reports fatigue days 4-5 and wants to rest most of these days.       ROS: 10 point ROS neg other than the symptoms noted above in the HPI.       His past medical history is significant only for isolated intermittent atrial fibrillation for which he is intermittently taking a baby aspirin.  He denies any other significant past medical history.  He considers himself to be relatively healthy.      SOCIAL HISTORY:  He has a remote history of smoking, about 20-pack years; he quit 10 years ago.  He denies alcohol or illicit drugs.  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  He has a sister who lives in the city, and he has a 22-year-old son.      REVIEW OF SYSTEMS:  A complete  review of systems is negative, except as documented above.            Past Medical/Surgical History:  Past Medical History:   Diagnosis Date     Atrial fibrillation (H)      Cancer (H)     esophageal     Glaucoma      Glaucoma      Past Surgical History:   Procedure Laterality Date     AMPUTATION      toe     ARTHROSCOPY KNEE       bunion surgery       CHOLECYSTECTOMY       INSERT PORT VASCULAR ACCESS Right 5/9/2017    Procedure: INSERT PORT VASCULAR ACCESS;  Single Lumen Chest Power Port;  Surgeon: Jasiel Hu PA-C;  [facility]: [facility]   Allergies:  Allergies as of 07/10/2017 - David as Reviewed 07/10/2017   Allergen Reaction Noted     Penicillin g Other (See Comments) 01/19/2017     Penicillins Unknown 04/27/2017     Current Medications:  Current Outpatient Prescriptions   Medication Sig Dispense Refill     zolpidem (AMBIEN) 5 MG tablet Take 1-2 tablets each evening as needed 60 tablet 1     Rivaroxaban (XARELTO STARTER PACK) 15 & 20 MG TBPK Take 15 mg by mouth 2 times daily For 21 days and then 20mg daily 51 each 0     lidocaine-prilocaine (EMLA) cream Apply topically as needed for moderate pain 30 g 3     LORazepam (ATIVAN) 0.5 MG tablet Take 1 tablet (0.5 mg) by mouth every 6 hours as needed for anxiety 60 tablet 1     multivitamin, therapeutic with minerals (MULTI-VITAMIN) TABS tablet Take 1 tablet by mouth daily       fish oil-omega-3 fatty acids 1000 MG capsule Take 2 g by mouth daily       IBUPROFEN PO Take 800 mg by mouth every 8 hours as needed for moderate pain       latanoprost (XALATAN) 0.005 % ophthalmic solution Place into both eyes At Bedtime       timolol (TIMOPTIC) 0.5 % ophthalmic solution Place into both eyes daily       ondansetron (ZOFRAN) 8 MG tablet Take 1 tablet (8 mg) by mouth every 8 hours as needed (Nausea/Vomiting) (Patient not taking: Reported on 7/10/2017) 30 tablet 2     prochlorperazine (COMPAZINE) 10 MG tablet Take 1 tablet (10 mg) by mouth every 6 hours as needed  (Nausea/Vomiting) (Patient not taking: Reported on 6/28/2017) 30 tablet 2      Family History:    Maternal grandmother had breast cancer diagnosed at her 50's.Paternal aunt had some gynecologic cancer    Social History:  Social History     Social History     Marital status: Single     Spouse name: N/A     Number of children: N/A     Years of education: N/A     Occupational History     Not on file.     Social History Main Topics     Smoking status: Former Smoker     Smokeless tobacco: Never Used     Alcohol use Yes      Comment: occasional     Drug use: No      Comment: h/o over 30 years ago     Sexual activity: Not on file     Other Topics Concern     Not on file     Social History Narrative     Physical Exam:  /80 (BP Location: Right arm, Patient Position: Chair, Cuff Size: Adult Large)  Pulse 80  Temp 98  F (36.7  C) (Oral)  Resp 16  Wt (!) 164.9 kg (363 lb 8 oz)  SpO2 96%  BMI 42.02 kg/m2    GENERAL APPEARANCE: healthy, alert and no distress     HENT: Mouth without ulcers or lesions     NECK: no adenopathy, no asymmetry or masses     LYMPHATICS: No cervical, supraclavicular, axillary or inguinal lymphadenopathy appreciated     RESP: lungs clear to auscultation - no rales, rhonchi or wheezes     CARDIOVASCULAR: regular rates and rhythm, normal S1 S2, no S3 or S4 and no murmur.     ABDOMEN:  soft, nontender, no HSM or masses and bowel sounds normal     MUSCULOSKELETAL: extremities normal- no gross deformities noted, no evidence of inflammation in joints, FROM in all extremities. No edema b/l LE.     SKIN: no suspicious lesions or rashes     PSYCHIATRIC: mentation appears normal and affect normal  Lab Results   Component Value Date    WBC 4.3 07/10/2017     Lab Results   Component Value Date    RBC 4.59 07/10/2017     Lab Results   Component Value Date    HGB 13.7 07/10/2017     Lab Results   Component Value Date    HCT 40.8 07/10/2017     No components found for: MCT  Lab Results   Component Value Date     MCV 89 07/10/2017     Lab Results   Component Value Date    MCH 29.8 07/10/2017     Lab Results   Component Value Date    MCHC 33.6 07/10/2017     Lab Results   Component Value Date    RDW 17.2 07/10/2017     Lab Results   Component Value Date    PLT 81 07/10/2017       Recent Labs   Lab Test  07/10/17   0914  06/28/17   0950   NA  141  142   POTASSIUM  3.9  4.0   CHLORIDE  110*  108   CO2  24  25   ANIONGAP  8  8   GLC  104*  143*   BUN  14  11   CR  0.77  0.70   NIDHI  8.7  8.9     Liver Function Studies -   Recent Labs   Lab Test  07/10/17   0914   PROTTOTAL  7.0   ALBUMIN  3.2*   BILITOTAL  0.7   ALKPHOS  98   AST  23   ALT  24       ASSESSMENT/PLAN:  1.  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  This corresponds to stage IV cancer.      He is tolerating FOLFOX well and has had a good partial response to his treatment.  Will continue FOLFOX>  Will reduce oxaliplatin to 90% moving forward.  Neuropathy will need to be monitored closely.    2. Nausea -improved on Emend and decadron.    3.  A-Fib.  He seems to have intermittent a-fib. Currently asymptomatic and in sinus rhythm.  He is advised to continue aspirin.     4.  New incidental PE - discussed case with Dr Bland.  Mynor chavez.  Discussed risks/benefits of blood thinners.                Kadi Dee MD

## 2017-07-18 ENCOUNTER — TELEPHONE (OUTPATIENT)
Dept: ONCOLOGY | Facility: CLINIC | Age: 57
End: 2017-07-18

## 2017-07-18 NOTE — TELEPHONE ENCOUNTER
SOCIAL WORK  REferral sent to [facility] for pt's July, August and early September appts.  Message sent to Dr. Dee asking if pt still ok to stay without a caregiver.  Referral saved to  Pilo Constantino, [facility]  [phone]

## 2017-07-19 NOTE — PROGRESS NOTES
This is a recent snapshot of the patient's [facility] medical record.  For current drug dose and complete information and questions, call [phone] or In Basket pool, [facility] home infusion ([id number])  CSN Number:  [id number]

## 2017-07-25 ENCOUNTER — INFUSION THERAPY VISIT (OUTPATIENT)
Dept: ONCOLOGY | Facility: CLINIC | Age: 57
End: 2017-07-25
Attending: PHYSICIAN ASSISTANT
Payer: COMMERCIAL

## 2017-07-25 VITALS
TEMPERATURE: 98.2 F | HEIGHT: 78 IN | DIASTOLIC BLOOD PRESSURE: 76 MMHG | BODY MASS INDEX: 36.45 KG/M2 | HEART RATE: 70 BPM | WEIGHT: 315 LBS | OXYGEN SATURATION: 96 % | SYSTOLIC BLOOD PRESSURE: 119 MMHG

## 2017-07-25 DIAGNOSIS — C15.5 CANCER OF DISTAL THIRD OF ESOPHAGUS (H): Primary | ICD-10-CM

## 2017-07-25 DIAGNOSIS — G47.00 INSOMNIA, UNSPECIFIED TYPE: Primary | ICD-10-CM

## 2017-07-25 LAB
ALBUMIN SERPL-MCNC: 3.3 G/DL (ref 3.4–5)
ALP SERPL-CCNC: 94 U/L (ref 40–150)
ALT SERPL W P-5'-P-CCNC: 24 U/L (ref 0–70)
ANION GAP SERPL CALCULATED.3IONS-SCNC: 8 MMOL/L (ref 3–14)
AST SERPL W P-5'-P-CCNC: 24 U/L (ref 0–45)
BASOPHILS # BLD AUTO: 0 10E9/L (ref 0–0.2)
BASOPHILS NFR BLD AUTO: 0.6 %
BILIRUB SERPL-MCNC: 0.8 MG/DL (ref 0.2–1.3)
BUN SERPL-MCNC: 11 MG/DL (ref 7–30)
CALCIUM SERPL-MCNC: 9.1 MG/DL (ref 8.5–10.1)
CHLORIDE SERPL-SCNC: 108 MMOL/L (ref 94–109)
CO2 SERPL-SCNC: 25 MMOL/L (ref 20–32)
CREAT SERPL-MCNC: 0.89 MG/DL (ref 0.66–1.25)
DIFFERENTIAL METHOD BLD: ABNORMAL
EOSINOPHIL # BLD AUTO: 0 10E9/L (ref 0–0.7)
EOSINOPHIL NFR BLD AUTO: 1.3 %
ERYTHROCYTE [DISTWIDTH] IN BLOOD BY AUTOMATED COUNT: 17.9 % (ref 10–15)
GFR SERPL CREATININE-BSD FRML MDRD: 88 ML/MIN/1.7M2
GLUCOSE SERPL-MCNC: 104 MG/DL (ref 70–99)
HCT VFR BLD AUTO: 38 % (ref 40–53)
HGB BLD-MCNC: 12.9 G/DL (ref 13.3–17.7)
IMM GRANULOCYTES # BLD: 0 10E9/L (ref 0–0.4)
IMM GRANULOCYTES NFR BLD: 0.6 %
LYMPHOCYTES # BLD AUTO: 1 10E9/L (ref 0.8–5.3)
LYMPHOCYTES NFR BLD AUTO: 31.6 %
MCH RBC QN AUTO: 31.1 PG (ref 26.5–33)
MCHC RBC AUTO-ENTMCNC: 33.9 G/DL (ref 31.5–36.5)
MCV RBC AUTO: 92 FL (ref 78–100)
MONOCYTES # BLD AUTO: 0.3 10E9/L (ref 0–1.3)
MONOCYTES NFR BLD AUTO: 9.3 %
NEUTROPHILS # BLD AUTO: 1.8 10E9/L (ref 1.6–8.3)
NEUTROPHILS NFR BLD AUTO: 56.6 %
NRBC # BLD AUTO: 0 10*3/UL
NRBC BLD AUTO-RTO: 0 /100
PLATELET # BLD AUTO: 89 10E9/L (ref 150–450)
POTASSIUM SERPL-SCNC: 3.7 MMOL/L (ref 3.4–5.3)
PROT SERPL-MCNC: 7 G/DL (ref 6.8–8.8)
RBC # BLD AUTO: 4.15 10E12/L (ref 4.4–5.9)
SODIUM SERPL-SCNC: 140 MMOL/L (ref 133–144)
WBC # BLD AUTO: 3.1 10E9/L (ref 4–11)

## 2017-07-25 PROCEDURE — 99215 OFFICE O/P EST HI 40 MIN: CPT | Mod: ZP | Performed by: PHYSICIAN ASSISTANT

## 2017-07-25 PROCEDURE — 96411 CHEMO IV PUSH ADDL DRUG: CPT

## 2017-07-25 PROCEDURE — 25000125 ZZHC RX 250: Mod: ZF | Performed by: INTERNAL MEDICINE

## 2017-07-25 PROCEDURE — 85025 COMPLETE CBC W/AUTO DIFF WBC: CPT | Performed by: INTERNAL MEDICINE

## 2017-07-25 PROCEDURE — 80053 COMPREHEN METABOLIC PANEL: CPT | Performed by: INTERNAL MEDICINE

## 2017-07-25 PROCEDURE — 99212 OFFICE O/P EST SF 10 MIN: CPT | Mod: ZF

## 2017-07-25 PROCEDURE — 25000128 H RX IP 250 OP 636: Mod: ZF | Performed by: PHYSICIAN ASSISTANT

## 2017-07-25 PROCEDURE — 96416 CHEMO PROLONG INFUSE W/PUMP: CPT

## 2017-07-25 PROCEDURE — 96368 THER/DIAG CONCURRENT INF: CPT

## 2017-07-25 PROCEDURE — 96413 CHEMO IV INFUSION 1 HR: CPT

## 2017-07-25 PROCEDURE — 25000128 H RX IP 250 OP 636: Mod: ZF | Performed by: INTERNAL MEDICINE

## 2017-07-25 PROCEDURE — 96367 TX/PROPH/DG ADDL SEQ IV INF: CPT

## 2017-07-25 PROCEDURE — 96415 CHEMO IV INFUSION ADDL HR: CPT

## 2017-07-25 PROCEDURE — 96375 TX/PRO/DX INJ NEW DRUG ADDON: CPT

## 2017-07-25 RX ORDER — ZOLPIDEM TARTRATE 12.5 MG/1
12.5 TABLET, FILM COATED, EXTENDED RELEASE ORAL
Qty: 30 TABLET | Refills: 1 | Status: SHIPPED | OUTPATIENT
Start: 2017-07-25 | End: 2017-10-30

## 2017-07-25 RX ORDER — FLUOROURACIL 50 MG/ML
400 INJECTION, SOLUTION INTRAVENOUS ONCE
Status: COMPLETED | OUTPATIENT
Start: 2017-07-25 | End: 2017-07-25

## 2017-07-25 RX ORDER — HEPARIN SODIUM (PORCINE) LOCK FLUSH IV SOLN 100 UNIT/ML 100 UNIT/ML
5 SOLUTION INTRAVENOUS ONCE
Status: COMPLETED | OUTPATIENT
Start: 2017-07-25 | End: 2017-07-25

## 2017-07-25 RX ORDER — PALONOSETRON 0.05 MG/ML
0.25 INJECTION, SOLUTION INTRAVENOUS ONCE
Status: COMPLETED | OUTPATIENT
Start: 2017-07-25 | End: 2017-07-25

## 2017-07-25 RX ADMIN — DEXAMETHASONE SODIUM PHOSPHATE: 10 INJECTION, SOLUTION INTRAMUSCULAR; INTRAVENOUS at 09:37

## 2017-07-25 RX ADMIN — SODIUM CHLORIDE, PRESERVATIVE FREE 5 ML: 5 INJECTION INTRAVENOUS at 08:02

## 2017-07-25 RX ADMIN — PALONOSETRON HYDROCHLORIDE 0.25 MG: 0.25 INJECTION INTRAVENOUS at 09:37

## 2017-07-25 RX ADMIN — SODIUM CHLORIDE 150 MG: 9 INJECTION, SOLUTION INTRAVENOUS at 09:52

## 2017-07-25 RX ADMIN — LEUCOVORIN CALCIUM 1050 MG: 350 INJECTION, POWDER, LYOPHILIZED, FOR SOLUTION INTRAMUSCULAR; INTRAVENOUS at 10:17

## 2017-07-25 RX ADMIN — FLUOROURACIL 1220 MG: 50 INJECTION, SOLUTION INTRAVENOUS at 12:25

## 2017-07-25 RX ADMIN — DEXTROSE MONOHYDRATE 250 ML: 50 INJECTION, SOLUTION INTRAVENOUS at 09:37

## 2017-07-25 RX ADMIN — OXALIPLATIN 233 MG: 5 INJECTION, SOLUTION, CONCENTRATE INTRAVENOUS at 10:20

## 2017-07-25 ASSESSMENT — PAIN SCALES - GENERAL: PAINLEVEL: NO PAIN (0)

## 2017-07-25 NOTE — NURSING NOTE
Chief Complaint   Patient presents with     Port Draw     labs drawn     /76 (BP Location: Right arm, Patient Position: Chair, Cuff Size: Adult Large)  Pulse 70  Temp 98.2  F (36.8  C) (Oral)  Wt (!) 162.8 kg (359 lb)  SpO2 96%  BMI 41.5 kg/m2    Vitals taken. Port accessed by RN. Labs collected and sent. Line flushed with NS & Heparin. Pt tolerated well. Pt checked in for next appointment.    Daniela Fisher RN

## 2017-07-25 NOTE — NURSING NOTE
"Oncology Rooming Note    July 25, 2017 8:26 AM   Reuben Padilla is a 56 year old male who presents for:    Chief Complaint   Patient presents with     Port Draw     labs drawn     Oncology Clinic Visit     Return: Esophageal Ca     Initial Vitals: /76 (BP Location: Right arm, Patient Position: Chair, Cuff Size: Adult Large)  Pulse 70  Temp 98.2  F (36.8  C) (Oral)  Ht 1.981 m (6' 5.99\")  Wt (!) 162.8 kg (359 lb)  SpO2 96%  BMI 41.5 kg/m2 Estimated body mass index is 41.5 kg/(m^2) as calculated from the following:    Height as of this encounter: 1.981 m (6' 5.99\").    Weight as of this encounter: 162.8 kg (359 lb). Body surface area is 2.99 meters squared.  No Pain (0) Comment: Data Unavailable   No LMP for male patient.  Allergies reviewed: Yes  Medications reviewed: Yes    Medications: Medication refills not needed today.  Pharmacy name entered into [employer]: [id number] - [facility] - [address] [place] [address] AT [address]CTION [address] & [facility]    Clinical concerns: no new concerns     6 minutes for nursing intake (face to face time)     Gianna Nolan CMA                "

## 2017-07-25 NOTE — PATIENT INSTRUCTIONS
Contact Numbers    [facility] Main Line: [phone]  [facility] Triage:  [phone]    Call triage with chills and/or temperature greater than or equal to 100.5, uncontrolled nausea/vomiting, diarrhea, constipation, dizziness, shortness of breath, chest pain, bleeding, unexplained bruising, or any new/concerning symptoms, questions/concerns.     If you are having any concerning symptoms or wish to speak to a provider before your next infusion visit, please call your care coordinator or triage to notify them so we can adequately serve you.       After Hours: [phone]    If after hours, weekends, or holidays, call main hospital  and ask for Oncology doctor on call.         July 2017 Sunday Monday Tuesday Wednesday Thursday Friday Saturday                                 1       2     3     4     5     6     7     8       9     10     [facility] DRAW    9:00 AM   (15 min.)    [facility]   [facility]     CT CHEST/ABDOMEN/PELVIS W    9:25 AM   (20 min.)   [facility]CT1   [facility] CT     [facility] RETURN   11:15 AM   (30 min.)   Kadi Dee MD   [facility] ONC INFUSION 240    1:00 PM   (240 min.)    ONCOLOGY INFUSION   [facility] 11     12     13     14     15       16     17     18     19     20     21     22       23     24     25     [facility] DRAW    8:00 AM   (15 min.)    [facility]   [facility]     [facility] RETURN    8:15 AM   (50 min.)   Katalina Ramirez PA-C   [facility] ONC INFUSION 240    9:30 AM   (240 min.)    ONCOLOGY INFUSION   [facility] 26     27     28     29       30     31 August 2017 Sunday Monday Tuesday Wednesday Thursday Friday Saturday             1     2     3     4     5       6     7     [facility] DRAW    8:00 AM   (15 min.)    [facility] D[facility]   [facility]      [facility] RETURN    8:15 AM   (30 min.)   Kadi Dee MD   [facility]     [facility] ONC INFUSION 240   10:00 AM   (240 min.)   [facility] ONCOLOGY INFUSION   [facility] 8     9     10     11     12       13     14     15     16     17     18     19       20     21     [facility] NEW ATRIAL FIBRILLATION    3:45 PM   (40 min.)   Romero Guerrero MD   [facility] 22     [facility] DRAW    8:00 AM   (15 min.)   [facility] DRAW   [facility] Lab Draw     [facility] RETURN    8:15 AM   (50 min.)   Katalina Ramirez PA-C   [facility] ONC INFUSION 240    9:30 AM   (240 min.)    ONCOLOGY INFUSION   [facility] 23     24     25     26       27     28     29     30     31  Happy Birthday!                            Lab Results:  Recent Results (from the past 12 hour(s))   CBC with platelets differential    Collection Time: 07/25/17  8:20 AM   Result Value Ref Range    WBC 3.1 (L) 4.0 - 11.0 10e9/L    RBC Count 4.15 (L) 4.4 - 5.9 10e12/L    Hemoglobin 12.9 (L) 13.3 - 17.7 g/dL    Hematocrit 38.0 (L) 40.0 - 53.0 %    MCV 92 78 - 100 fl    MCH 31.1 26.5 - 33.0 pg    MCHC 33.9 31.5 - 36.5 g/dL    RDW 17.9 (H) 10.0 - 15.0 %    Platelet Count 89 (L) 150 - 450 10e9/L    Diff Method Automated Method     % Neutrophils 56.6 %    % Lymphocytes 31.6 %    % Monocytes 9.3 %    % Eosinophils 1.3 %    % Basophils 0.6 %    % Immature Granulocytes 0.6 %    Nucleated RBCs 0 0 /100    Absolute Neutrophil 1.8 1.6 - 8.3 10e9/L    Absolute Lymphocytes 1.0 0.8 - 5.3 10e9/L    Absolute Monocytes 0.3 0.0 - 1.3 10e9/L    Absolute Eosinophils 0.0 0.0 - 0.7 10e9/L    Absolute Basophils 0.0 0.0 - 0.2 10e9/L    Abs Immature Granulocytes 0.0 0 - 0.4 10e9/L    Absolute Nucleated RBC 0.0    Comprehensive metabolic panel    Collection Time: 07/25/17  8:20 AM   Result Value Ref Range    Sodium 140 133 - 144 mmol/L    Potassium 3.7 3.4 - 5.3 mmol/L    Chloride 108 94  - 109 mmol/L    Carbon Dioxide 25 20 - 32 mmol/L    Anion Gap 8 3 - 14 mmol/L    Glucose 104 (H) 70 - 99 mg/dL    Urea Nitrogen 11 7 - 30 mg/dL    Creatinine 0.89 0.66 - 1.25 mg/dL    GFR Estimate 88 >60 mL/min/1.7m2    GFR Estimate If Black >90   GFR Calc   >60 mL/min/1.7m2    Calcium 9.1 8.5 - 10.1 mg/dL    Bilirubin Total 0.8 0.2 - 1.3 mg/dL    Albumin 3.3 (L) 3.4 - 5.0 g/dL    Protein Total 7.0 6.8 - 8.8 g/dL    Alkaline Phosphatase 94 40 - 150 U/L    ALT 24 0 - 70 U/L    AST 24 0 - 45 U/L

## 2017-07-25 NOTE — PROGRESS NOTES
Infusion Nursing Note:  Patient presents today for Cycle 6 day 1 Oxaliplatin, leucovorin, Fluorouacil push and pump connect.    Patient seen by provider today: Yes - Katalina HILL.    Note: N/A.    Intravenous Access:  Implanted Port.    Treatment Conditions:  Lab Results   Component Value Date    HGB 12.9 07/25/2017     Lab Results   Component Value Date    WBC 3.1 07/25/2017      Lab Results   Component Value Date    ANEU 1.8 07/25/2017     Lab Results   Component Value Date    PLT 89 07/25/2017      Lab Results   Component Value Date     07/25/2017                   Lab Results   Component Value Date    POTASSIUM 3.7 07/25/2017           No results found for: MAG         Lab Results   Component Value Date    CR 0.89 07/25/2017                   Lab Results   Component Value Date    NIDHI 9.1 07/25/2017                Lab Results   Component Value Date    BILITOTAL 0.8 07/25/2017           Lab Results   Component Value Date    ALBUMIN 3.3 07/25/2017                    Lab Results   Component Value Date    ALT 24 07/25/2017           Lab Results   Component Value Date    AST 24 07/25/2017     Results reviewed, labs MET treatment parameters, ok to proceed with treatment.    Post Infusion Assessment:  Patient tolerated infusion without incident.  Blood return noted pre and post infusion.  Blood return noted during fluorouracil push administration every 3 cc.  Site patent and intact, free from redness, edema or discomfort.  No evidence of extravasations.    Prior to discharge: Port is secured in place with tegaderm and flushed with 10cc NS with positive blood return noted.  Continuous home infusion pump connected.    All connectors secured in place and clamps taped open.    Patient instructed to call our clinic or [facility] with any questions or concerns at home.  Patient verbalized understanding.    Patient set up for pump disconnect at home with [facility] on Thursday at 1030.       Discharge Plan:   Prescription refills given for Stevan CR.  Discharge instructions reviewed with: Patient.  Patient and/or family verbalized understanding of discharge instructions and all questions answered.  Copy of AVS reviewed with patient and/or family.  Patient will return 8/7 for next appointment.  Patient discharged in stable condition accompanied by: self and sister.  Departure Mode: Ambulatory.  Face to Face time: 0 minutes.    Paolo Mckeon RN.

## 2017-07-25 NOTE — PROGRESS NOTES
HEMATOLOGY/ONCOLOGY PROGRESS NOTE  Jul 25, 2017    REASON FOR VISIT: add-on for low BP in patient with esophogeal cancer    DIAGNOSIS:   Reuben Padilla is a 56-year-old male with metastatic esophogeal cancer with liver metastases and widespread lavon metastasis. His tumor is positive for cytokeratin 20, negative for P63 and CK7, and HER2 is negative. He started on FOLFOX (5FU/oxaliplatin) on 5/15/2017.     INTERVAL HISTORY:   Reuben is here with his sister today. He had worsening cold sensitivity after this last cycle, mostly affecting his hands. It lasts throughout the 2 week period now. He is also having worsening taste to the point where he cannot taste certain things now. The nausea was bad on the night of day 3. He didn't trial any anti-emetics because he didn't know which ones he could take. He has had a dry nasal mucosa with an episode of dried blood after blowing his nose, but otherwise no other bleeding. No fevers, chills, cough, SOB, chest pain, vomiting, abdominal pain, bowel changes, swelling, palpitations, or dizziness. Remainder of 10-pt ROS otherwise is negative.    Current Outpatient Prescriptions   Medication Sig Dispense Refill     zolpidem (AMBIEN) 5 MG tablet Take 1-2 tablets each evening as needed 60 tablet 1     Rivaroxaban (XARELTO STARTER PACK) 15 & 20 MG TBPK Take 15 mg by mouth 2 times daily For 21 days and then 20mg daily 51 each 0     ondansetron (ZOFRAN) 8 MG tablet Take 1 tablet (8 mg) by mouth every 8 hours as needed (Nausea/Vomiting) (Patient not taking: Reported on 7/10/2017) 30 tablet 2     lidocaine-prilocaine (EMLA) cream Apply topically as needed for moderate pain 30 g 3     prochlorperazine (COMPAZINE) 10 MG tablet Take 1 tablet (10 mg) by mouth every 6 hours as needed (Nausea/Vomiting) (Patient not taking: Reported on 6/28/2017) 30 tablet 2     LORazepam (ATIVAN) 0.5 MG tablet Take 1 tablet (0.5 mg) by mouth every 6 hours as needed for anxiety 60 tablet 1     multivitamin,  "therapeutic with minerals (MULTI-VITAMIN) TABS tablet Take 1 tablet by mouth daily       fish oil-omega-3 fatty acids 1000 MG capsule Take 2 g by mouth daily       IBUPROFEN PO Take 800 mg by mouth every 8 hours as needed for moderate pain       latanoprost (XALATAN) 0.005 % ophthalmic solution Place into both eyes At Bedtime       timolol (TIMOPTIC) 0.5 % ophthalmic solution Place into both eyes daily            Allergies   Allergen Reactions     Penicillin G Other (See Comments)     Pt not sure-     Penicillins Unknown       PHYSICAL EXAMINATION  /76 (BP Location: Right arm, Patient Position: Chair, Cuff Size: Adult Large)  Pulse 70  Temp 98.2  F (36.8  C) (Oral)  Ht 1.981 m (6' 5.99\")  Wt (!) 162.8 kg (359 lb)  SpO2 96%  BMI 41.5 kg/m2  Wt Readings from Last 4 Encounters:   07/25/17 (!) 162.8 kg (359 lb)   07/10/17 (!) 164.9 kg (363 lb 8 oz)   06/27/17 (!) 166.9 kg (367 lb 14.4 oz)   06/12/17 (!) 163.9 kg (361 lb 6.4 oz)     Constitutional: Alert, oriented male in no visible distress.  Eyes: PERRL. Anicteric sclerae.  ENT/Mouth: OM moist and pink without lesions or thrush.  CV: sinus rhythm today, RRR  Resp: CTAB throughout  Abdomen: Soft, non-tender, non-distended. Bowel sounds present.Limited by body habitus and seated position  Extremities: No lower extremity edema appreciated.  Skin: Warm, dry. No bruising or petechiae noted. Some mild venous stasis changes on LE bilat.  Lymph: No cervical or supraclavicular lymphadenopathy appreciated.   Neuro: CN II-XII grossly intact.    LABS:  Results for KORI CHANEY (MRN [id number]) as of 7/25/2017 09:04   Ref. Range 7/25/2017 08:20   Sodium Latest Ref Range: 133 - 144 mmol/L 140   Potassium Latest Ref Range: 3.4 - 5.3 mmol/L 3.7   Chloride Latest Ref Range: 94 - 109 mmol/L 108   Carbon Dioxide Latest Ref Range: 20 - 32 mmol/L 25   Urea Nitrogen Latest Ref Range: 7 - 30 mg/dL 11   Creatinine Latest Ref Range: 0.66 - 1.25 mg/dL 0.89   GFR Estimate Latest " Ref Range: >60 mL/min/1.7m2 88   GFR Estimate If Black Latest Ref Range: >60 mL/min/1.7m2 >90...   Calcium Latest Ref Range: 8.5 - 10.1 mg/dL 9.1   Anion Gap Latest Ref Range: 3 - 14 mmol/L 8   Albumin Latest Ref Range: 3.4 - 5.0 g/dL 3.3 (L)   Protein Total Latest Ref Range: 6.8 - 8.8 g/dL 7.0   Bilirubin Total Latest Ref Range: 0.2 - 1.3 mg/dL 0.8   Alkaline Phosphatase Latest Ref Range: 40 - 150 U/L 94   ALT Latest Ref Range: 0 - 70 U/L 24   AST Latest Ref Range: 0 - 45 U/L 24   Glucose Latest Ref Range: 70 - 99 mg/dL 104 (H)   WBC Latest Ref Range: 4.0 - 11.0 10e9/L 3.1 (L)   Hemoglobin Latest Ref Range: 13.3 - 17.7 g/dL 12.9 (L)   Hematocrit Latest Ref Range: 40.0 - 53.0 % 38.0 (L)   Platelet Count Latest Ref Range: 150 - 450 10e9/L 89 (L)       IMPRESSION/PLAN:  Reuben Padilla is a 56 year old male with metastatic esophogeal involvement the liver and widespread lavon involvement, currently on FOLFOX.     1. Metastatic esophogeal cancer. Partial response after 4 cycles of FOLFOX. He presents for cycle 6 today. Oxaliplatin will be dose reduced by 10% for neuropathy and cold sensitivity. He is otherwise tolerating this overall fairly well. He will follow-up prior to cycle 7.    2. Nausea. Improved with Emend and Decadron. Counseled use of Compazine on day 3 for nausea.    3. A fib. Intermittent a-fib. Sinus today. He is on Xarelto for PE.    4. Incidental PE: Identified on restaging 7/10/17. He started on Xarelto 15 mg BID x 21 days, followed by 20 mg daily. Will continue to monitor PLT.    5. Insomnia: Currently on Ambien 10 mg with improvement in sleep initiation but continued issue with sleep maintenance. Will trial the Ambien CR, instructed to stop the Ambien 10 mg.    6. Fatigue: Gradually worsening. Could be related to poor sleep. Will trial Ambien CR as above, but if improved sleep doesn't improve the fatigue, could discuss Ritalin at a future visit.    7. Nasal dryness: Nasal saline spray and Vaseline  to nasal mucosa PRN, trial humidifier    Katalina Ramirez PA-C  Hematology, Oncology, and Transplant  [facility] [address] [address] [phone]

## 2017-07-25 NOTE — MR AVS SNAPSHOT
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     If you are having any concerning symptoms or wish to speak to a provider before your next infusion visit, please call your care coordinator or triage to notify them so we can adequately serve you.       After Hours: [phone]    If after hours, weekends, or holidays, call main hospital  and ask for Oncology doctor on call.         July 2017 Sunday Monday Tuesday Wednesday Thursday Friday Saturday                                 1       2     3     4     5     6     7     8       9     10     [facility] LAB DRAW    9:00 AM   (15 min.)    [facility] LAB DRAW   [facility] Lab Draw     CT CHEST/ABDOMEN/PELVIS W    9:25 AM   (20 min.)   UCCT1   [facility] CT     UMP RETURN   11:15 AM   (30 min.)   Kadi Dee MD   [facility] ONC INFUSION 240    1:00 PM   (240 min.)    ONCOLOGY INFUSION   [facility] 11     12     13     14     15       16     17     18     19     20     21     22       23     24     25     [facility] DRAW    8:00 AM   (15 min.)    [facility]   [facility]     UMP RETURN    8:15 AM   (50 min.)   Katalina Ramirez PA-C   [facility] ONC  INFUSION 240    9:30 AM   (240 min.)   UC ONCOLOGY INFUSION   [facility] 26     27     28     29       30     31 August 2017 Sunday Monday Tuesday Wednesday Thursday Friday Saturday             1     2     3     4     5       6     7     [facility] DRAW    8:00 AM   (15 min.)   UC [facility] LAB DRAW   [facility] Lab Draw     UMP RETURN    8:15 AM   (30 min.)   Kadi Dee MD   [facility]     UMP ONC INFUSION 240   10:00 AM   (240 min.)   UC ONCOLOGY INFUSION   [facility] 8     9     10     11     12       13     14     15     16     17     18     19       20     21     UMP NEW ATRIAL FIBRILLATION    3:45 PM   (40 min.)   Romero Guerrero MD   [facility] 22     [facility] LAB DRAW    8:00 AM   (15 min.)    [facility] LAB DRAW   [facility] Lab Draw     UMP RETURN    8:15 AM   (50 min.)   Katalina Ramirez PA-C   [facility]    9:30 AM   (240 min.)   UC ONCOLOGY INFUSION   [facility] 23     24     25     26       27     28     29     30     31  Happy Birthday!                            Lab Results:  Recent Results (from the past 12 hour(s))   CBC with platelets differential    Collection Time: 07/25/17  8:20 AM   Result Value Ref Range    WBC 3.1 (L) 4.0 - 11.0 10e9/L    RBC Count 4.15 (L) 4.4 - 5.9 10e12/L    Hemoglobin 12.9 (L) 13.3 - 17.7 g/dL    Hematocrit 38.0 (L) 40.0 - 53.0 %    MCV 92 78 - 100 fl    MCH 31.1 26.5 - 33.0 pg    MCHC 33.9 31.5 - 36.5 g/dL    RDW 17.9 (H) 10.0 - 15.0 %    Platelet Count 89 (L) 150 - 450 10e9/L    Diff Method Automated Method     % Neutrophils 56.6 %    % Lymphocytes 31.6 %    % Monocytes 9.3 %    % Eosinophils 1.3 %    % Basophils 0.6 %    % Immature Granulocytes 0.6 %    Nucleated RBCs 0 0 /100    Absolute Neutrophil 1.8 1.6 - 8.3 10e9/L    Absolute Lymphocytes 1.0 0.8 -  5.3 10e9/L    Absolute Monocytes 0.3 0.0 - 1.3 10e9/L    Absolute Eosinophils 0.0 0.0 - 0.7 10e9/L    Absolute Basophils 0.0 0.0 - 0.2 10e9/L    Abs Immature Granulocytes 0.0 0 - 0.4 10e9/L    Absolute Nucleated RBC 0.0    Comprehensive metabolic panel    Collection Time: 07/25/17  8:20 AM   Result Value Ref Range    Sodium 140 133 - 144 mmol/L    Potassium 3.7 3.4 - 5.3 mmol/L    Chloride 108 94 - 109 mmol/L    Carbon Dioxide 25 20 - 32 mmol/L    Anion Gap 8 3 - 14 mmol/L    Glucose 104 (H) 70 - 99 mg/dL    Urea Nitrogen 11 7 - 30 mg/dL    Creatinine 0.89 0.66 - 1.25 mg/dL    GFR Estimate 88 >60 mL/min/1.7m2    GFR Estimate If Black >90   GFR Calc   >60 mL/min/1.7m2    Calcium 9.1 8.5 - 10.1 mg/dL    Bilirubin Total 0.8 0.2 - 1.3 mg/dL    Albumin 3.3 (L) 3.4 - 5.0 g/dL    Protein Total 7.0 6.8 - 8.8 g/dL    Alkaline Phosphatase 94 40 - 150 U/L    ALT 24 0 - 70 U/L    AST 24 0 - 45 U/L               Follow-ups after your visit        Your next 10 appointments already scheduled     Aug 07, 2017  8:00 AM CDT   [facility]   [facility] Lab Draw ([facility])    [address] [place] [address]  2nd [facility] [id number] [phone]            Aug 07, 2017  8:30 AM CDT   (Arrive by 8:15 AM)   Return Visit with Kadi Dee MD   [facility] ([facility])    [address] [place] [address] [id number] [phone]            Aug 07, 2017 10:00 AM CDT   Infusion 240 with  ONCOLOGY INFUSION, UC 17 ATC   [facility] ([facility])    [address] [place] [address]  2nd [facility] [id number] [phone]            Aug 21, 2017  4:00 PM CDT   (Arrive by 3:45 PM)   ATRIAL FIBULATION VISIT with Romero Guerrero MD   [facility] ([facility])    [address] [id number]    [phone]            Aug 22, 2017  8:00 AM CDT   [facility] Draw with UC [facility] LAB DRAW   [facility] Lab Draw ([facility])    [address] [place] [id number] [phone]            Aug 22, 2017  8:30 AM CDT   (Arrive by 8:15 AM)   Return Visit with Katalina Ramirez PA-C   [facility] ([facility])    [address] [place] [phone]            Aug 22, 2017  9:30 AM CDT   Infusion 240 with UC ONCOLOGY INFUSION, UC 16 ATC   [facility] ([facility])    [address] [place] [phone]            Sep 05, 2017 11:00 AM CDT   [facility] Draw with UC [facility] LAB DRAW   [facility] Lab Draw ([facility])    [address] [place] [phone]              Who to contact     If you have questions or need follow up information about today's clinic visit or your schedule please contact [facility] CANCER [facility] directly at [phone].  Normal or non-critical lab and imaging results will be communicated to you by MyChart, letter or phone within 4 business days after the clinic has received the results. If you do not hear from us within 7 days, please contact the clinic through MyChart or phone. If you have a critical or abnormal lab result, we will notify you by phone as soon as possible.  Submit refill requests through [employer] or call your pharmacy and they will forward the refill request to us. Please allow 3 business days for your refill to be completed.          Additional Information About Your Visit        [employer] Information     [employer] gives you secure access to your electronic health record. If you see a primary care provider, you can also send messages to your care team and make appointments. If you  have questions, please call your primary care clinic.  If you do not have a primary care provider, please call [phone] and they will assist you.        Care EveryWhere ID     This is your Care EveryWhere ID. This could be used by other organizations to access your [place] medical records  [id number]         Blood Pressure from Last 3 Encounters:   07/25/17 119/76   07/10/17 133/80   06/29/17 133/67    Weight from Last 3 Encounters:   07/25/17 (!) 162.8 kg (359 lb)   07/10/17 (!) 164.9 kg (363 lb 8 oz)   06/27/17 (!) 166.9 kg (367 lb 14.4 oz)              We Performed the Following     CBC with platelets differential     Comprehensive metabolic panel          Today's Medication Changes          These changes are accurate as of: 7/25/17  1:04 PM.  If you have any questions, ask your nurse or doctor.               Start taking these medicines.        Dose/Directions    zolpidem 12.5 MG CR tablet   Commonly known as:  AMBIEN CR   Used for:  Insomnia, unspecified type   Replaces:  zolpidem 5 MG tablet   Started by:  Katalina Ramirez PA-C        Dose:  12.5 mg   Take 1 tablet (12.5 mg) by mouth nightly as needed for sleep   Quantity:  30 tablet   Refills:  1         Stop taking these medicines if you haven't already. Please contact your care team if you have questions.     zolpidem 5 MG tablet   Commonly known as:  AMBIEN   Replaced by:  zolpidem 12.5 MG CR tablet   Stopped by:  Katalina Ramirez PA-C                Where to get your medicines      Some of these will need a paper prescription and others can be bought over the counter.  Ask your nurse if you have questions.     Bring a paper prescription for each of these medications     zolpidem 12.5 MG CR tablet                Primary Care Provider    Unknown Primary MD Sydney       No address on file        Equal Access to Services     OLLIE SHARPE : Eliza Dunlap, yeyo davis, geraldo dumont  jeimy [place] [facility]. So [facility] [phone].    ATENCIÓN: Si alayna rodriguez, tiene a alamo disposición servicios gratuitos de asistencia lingüística. Tanisha anderson [phone].    We comply with applicable federal civil rights laws and [place] laws. We do not discriminate on the basis of race, color, national origin, age, disability sex, sexual orientation or gender identity.            Thank you!     Thank you for choosing [facility]  for your care. Our goal is always to provide you with excellent care. Hearing back from our patients is one way we can continue to improve our services. Please take a few minutes to complete the written survey that you may receive in the mail after your visit with us. Thank you!             Your Updated Medication List - Protect others around you: Learn how to safely use, store and throw away your medicines at www.disposemymeds.org.          This list is accurate as of: 7/25/17  1:04 PM.  Always use your most recent med list.                   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)       LORazepam 0.5 MG tablet    ATIVAN    60 tablet    Take 1 tablet (0.5 mg) by mouth every 6 hours as needed for anxiety    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), 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       Multi-vitamin Tabs tablet      Take 1 tablet by mouth daily        ondansetron 8 MG tablet    ZOFRAN    30 tablet    Take 1 tablet (8 mg) by mouth every 8 hours as needed (Nausea/Vomiting)    Cancer of distal third of  esophagus (H)       prochlorperazine 10 MG tablet    COMPAZINE    30 tablet    Take 1 tablet (10 mg) by mouth every 6 hours as needed (Nausea/Vomiting)    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)       Rivaroxaban 15 & 20 MG Tbpk    XARELTO STARTER PACK    51 each    Take 15 mg by mouth 2 times daily For 21 days and then 20mg daily    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), 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

## 2017-07-25 NOTE — LETTER
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 His tumor is positive for cytokeratin 20, negative for P63 and CK7, and HER2 is negative. He started on FOLFOX (5FU/oxaliplatin) on 5/15/2017.     INTERVAL HISTORY:   Reuben is here with his sister today. He had worsening cold sensitivity after this last cycle, mostly affecting his hands. It lasts throughout the 2 week period now. He is also having worsening taste to the point where he cannot taste certain things now. The nausea was bad on the night of day 3. He didn't trial any anti-emetics because he didn't know which ones he could take. He has had a dry nasal mucosa with an episode of dried blood after blowing his nose, but otherwise no other bleeding. No fevers, chills, cough, SOB, chest pain, vomiting, abdominal pain, bowel changes, swelling, palpitations, or dizziness. Remainder of 10-pt ROS otherwise is negative.    Current Outpatient Prescriptions   Medication Sig Dispense Refill     zolpidem (AMBIEN) 5 MG tablet Take 1-2 tablets each evening as needed 60 tablet 1     Rivaroxaban (XARELTO STARTER PACK) 15 & 20 MG TBPK Take 15 mg by mouth 2 times daily For 21 days and then 20mg daily 51 each 0     ondansetron (ZOFRAN) 8 MG tablet Take 1 tablet (8 mg) by mouth every 8 hours as needed (Nausea/Vomiting) (Patient not taking: Reported on 7/10/2017) 30 tablet 2     lidocaine-prilocaine (EMLA) cream Apply topically as needed for moderate pain 30 g 3     prochlorperazine (COMPAZINE) 10 MG tablet Take 1 tablet (10 mg) by mouth every 6 hours as needed (Nausea/Vomiting) (Patient not taking: Reported on 6/28/2017) 30 tablet 2     LORazepam (ATIVAN) 0.5 MG tablet Take 1 tablet (0.5 mg)  "by mouth every 6 hours as needed for anxiety 60 tablet 1     multivitamin, therapeutic with minerals (MULTI-VITAMIN) TABS tablet Take 1 tablet by mouth daily       fish oil-omega-3 fatty acids 1000 MG capsule Take 2 g by mouth daily       IBUPROFEN PO Take 800 mg by mouth every 8 hours as needed for moderate pain       latanoprost (XALATAN) 0.005 % ophthalmic solution Place into both eyes At Bedtime       timolol (TIMOPTIC) 0.5 % ophthalmic solution Place into both eyes daily            Allergies   Allergen Reactions     Penicillin G Other (See Comments)     Pt not sure-     Penicillins Unknown       PHYSICAL EXAMINATION  /76 (BP Location: Right arm, Patient Position: Chair, Cuff Size: Adult Large)  Pulse 70  Temp 98.2  F (36.8  C) (Oral)  Ht 1.981 m (6' 5.99\")  Wt (!) 162.8 kg (359 lb)  SpO2 96%  BMI 41.5 kg/m2  Wt Readings from Last 4 Encounters:   07/25/17 (!) 162.8 kg (359 lb)   07/10/17 (!) 164.9 kg (363 lb 8 oz)   06/27/17 (!) 166.9 kg (367 lb 14.4 oz)   06/12/17 (!) 163.9 kg (361 lb 6.4 oz)     Constitutional: Alert, oriented male in no visible distress.  Eyes: PERRL. Anicteric sclerae.  ENT/Mouth: OM moist and pink without lesions or thrush.  CV: sinus rhythm today, RRR  Resp: CTAB throughout  Abdomen: Soft, non-tender, non-distended. Bowel sounds present.Limited by body habitus and seated position  Extremities: No lower extremity edema appreciated.  Skin: Warm, dry. No bruising or petechiae noted. Some mild venous stasis changes on LE bilat.  Lymph: No cervical or supraclavicular lymphadenopathy appreciated.   Neuro: CN II-XII grossly intact.    LABS:  Results for KORI CHANEY (MRN [id number]) as of 7/25/2017 09:04   Ref. Range 7/25/2017 08:20   Sodium Latest Ref Range: 133 - 144 mmol/L 140   Potassium Latest Ref Range: 3.4 - 5.3 mmol/L 3.7   Chloride Latest Ref Range: 94 - 109 mmol/L 108   Carbon Dioxide Latest Ref Range: 20 - 32 mmol/L 25   Urea Nitrogen Latest Ref Range: 7 - 30 mg/dL 11 "   Creatinine Latest Ref Range: 0.66 - 1.25 mg/dL 0.89   GFR Estimate Latest Ref Range: >60 mL/min/1.7m2 88   GFR Estimate If Black Latest Ref Range: >60 mL/min/1.7m2 >90...   Calcium Latest Ref Range: 8.5 - 10.1 mg/dL 9.1   Anion Gap Latest Ref Range: 3 - 14 mmol/L 8   Albumin Latest Ref Range: 3.4 - 5.0 g/dL 3.3 (L)   Protein Total Latest Ref Range: 6.8 - 8.8 g/dL 7.0   Bilirubin Total Latest Ref Range: 0.2 - 1.3 mg/dL 0.8   Alkaline Phosphatase Latest Ref Range: 40 - 150 U/L 94   ALT Latest Ref Range: 0 - 70 U/L 24   AST Latest Ref Range: 0 - 45 U/L 24   Glucose Latest Ref Range: 70 - 99 mg/dL 104 (H)   WBC Latest Ref Range: 4.0 - 11.0 10e9/L 3.1 (L)   Hemoglobin Latest Ref Range: 13.3 - 17.7 g/dL 12.9 (L)   Hematocrit Latest Ref Range: 40.0 - 53.0 % 38.0 (L)   Platelet Count Latest Ref Range: 150 - 450 10e9/L 89 (L)       IMPRESSION/PLAN:  Reuben Padilla is a 56 year old male with metastatic esophogeal involvement the liver and widespread lavon involvement, currently on FOLFOX.     1. Metastatic esophogeal cancer. Partial response after 4 cycles of FOLFOX. He presents for cycle 6 today. Oxaliplatin will be dose reduced by 10% for neuropathy and cold sensitivity. He is otherwise tolerating this overall fairly well. He will follow-up prior to cycle 7.    2. Nausea. Improved with Emend and Decadron. Counseled use of Compazine on day 3 for nausea.    3. A fib. Intermittent a-fib. Sinus today. He is on Xarelto for PE.    4. Incidental PE: Identified on restaging 7/10/17. He started on Xarelto 15 mg BID x 21 days, followed by 20 mg daily. Will continue to monitor PLT.    5. Insomnia: Currently on Ambien 10 mg with improvement in sleep initiation but continued issue with sleep maintenance. Will trial the Ambien CR, instructed to stop the Ambien 10 mg.    6. Fatigue: Gradually worsening. Could be related to poor sleep. Will trial Ambien CR as above, but if improved sleep doesn't improve the fatigue, could discuss  Ritalin at a future visit.    7. Nasal dryness: Nasal saline spray and Vaseline to nasal mucosa PRN, trial humidifier    Katalina Ramirez PA-C  Hematology, Oncology, and Transplant  [facility] [address] [address] [phone]

## 2017-07-27 ENCOUNTER — TELEPHONE (OUTPATIENT)
Dept: ONCOLOGY | Facility: CLINIC | Age: 57
End: 2017-07-27

## 2017-07-27 ENCOUNTER — INFUSION THERAPY VISIT (OUTPATIENT)
Dept: ONCOLOGY | Facility: CLINIC | Age: 57
End: 2017-07-27
Attending: INTERNAL MEDICINE
Payer: COMMERCIAL

## 2017-07-27 VITALS
DIASTOLIC BLOOD PRESSURE: 81 MMHG | SYSTOLIC BLOOD PRESSURE: 142 MMHG | OXYGEN SATURATION: 97 % | HEART RATE: 64 BPM | BODY MASS INDEX: 41.91 KG/M2 | WEIGHT: 315 LBS | TEMPERATURE: 98 F | RESPIRATION RATE: 18 BRPM

## 2017-07-27 DIAGNOSIS — C15.5 CANCER OF DISTAL THIRD OF ESOPHAGUS (H): Primary | ICD-10-CM

## 2017-07-27 PROCEDURE — 96523 IRRIG DRUG DELIVERY DEVICE: CPT

## 2017-07-27 PROCEDURE — 25000128 H RX IP 250 OP 636: Mod: ZF | Performed by: INTERNAL MEDICINE

## 2017-07-27 RX ORDER — HEPARIN SODIUM (PORCINE) LOCK FLUSH IV SOLN 100 UNIT/ML 100 UNIT/ML
500 SOLUTION INTRAVENOUS ONCE
Status: COMPLETED | OUTPATIENT
Start: 2017-07-27 | End: 2017-07-27

## 2017-07-27 RX ADMIN — SODIUM CHLORIDE, PRESERVATIVE FREE 500 UNITS: 5 INJECTION INTRAVENOUS at 15:22

## 2017-07-27 NOTE — PROGRESS NOTES
"Infusion Nursing Note:  Reuben Padilla presents today for Chemotherapy pump disconnect.    Patient seen by provider today: No    Treatment Conditions:  Lab Results   Component Value Date    HGB 12.9 07/25/2017     Lab Results   Component Value Date    WBC 3.1 07/25/2017      Lab Results   Component Value Date    ANEU 1.8 07/25/2017     Lab Results   Component Value Date    PLT 89 07/25/2017      Lab Results   Component Value Date     07/25/2017                   Lab Results   Component Value Date    POTASSIUM 3.7 07/25/2017           No results found for: MAG         Lab Results   Component Value Date    CR 0.89 07/25/2017                   Lab Results   Component Value Date    NIDHI 9.1 07/25/2017                Lab Results   Component Value Date    BILITOTAL 0.8 07/25/2017           Lab Results   Component Value Date    ALBUMIN 3.3 07/25/2017                    Lab Results   Component Value Date    ALT 24 07/25/2017           Lab Results   Component Value Date    AST 24 07/25/2017           Intravenous Access:  Implanted Port.    Note: [facility] went do disconnect Fluorouracil pump at 1030 today but \"moderate amount of chemotherapy was still left in infuser\".  [facility] notified Dr. Dee. Pt scheduled for pump disconnect at 1500 today.  When pt arrived, infuser ball empty.  Pt with no complaints. Pt denies nausea, vomiting, diarrhea.      Post Infusion Assessment:  Blood return noted when pump disconnected.  Access discontinued per protocol.    Discharge Plan:   Patient declined prescription refills.  Discharge instructions reviewed with: Patient.  Patient and/or family verbalized understanding of discharge instructions and all questions answered.  Copy of AVS reviewed with patient and/or family.  Patient will return 8/7/2017 for next appointment.  Patient discharged in stable condition accompanied by: self.  Departure Mode: Ambulatory.  Face to Face time: 0.    Luzmaria Madison RN  "

## 2017-07-27 NOTE — PATIENT INSTRUCTIONS
Contact Numbers    [facility] Main Line: [phone]  [facility] Triage:  [phone]    Call triage with chills and/or temperature greater than or equal to 100.5, uncontrolled nausea/vomiting, diarrhea, constipation, dizziness, shortness of breath, chest pain, bleeding, unexplained bruising, or any new/concerning symptoms, questions/concerns.     If you are having any concerning symptoms or wish to speak to a provider before your next infusion visit, please call your care coordinator or triage to notify them so we can adequately serve you.       After Hours: [phone]    If after hours, weekends, or holidays, call main hospital  and ask for Oncology doctor on call.           July 2017 Sunday Monday Tuesday Wednesday Thursday Friday Saturday                                 1       2     3     4     5     6     7     8       9     10     P [facility] LAB DRAW    9:00 AM   (15 min.)    [facility] LAB DRAW   [facility] Lab Draw     CT CHEST/ABDOMEN/PELVIS W    9:25 AM   (20 min.)   UCCT1   [facility] CT     UMP RETURN   11:15 AM   (30 min.)   Kadi Dee MD   [facility]    1:00 PM   (240 min.)   UC ONCOLOGY INFUSION   [facility] 11     12     13     14     15       16     17     18     19     20     21     22       23     24     25     P [facility] DRAW    8:00 AM   (15 min.)    [facility] LAB DRAW   [facility] Lab Draw     UMP RETURN    8:15 AM   (50 min.)   Katalina Ramirez PA-C   [facility] ONC INFUSION 240    9:30 AM   (240 min.)   UC ONCOLOGY INFUSION   [facility] 26     27     UMP ONC INFUSION 60    3:00 PM   (60 min.)   UC ONCOLOGY INFUSION   [facility] 28     29       30     31                                         August 2017 Sunday Monday Tuesday Wednesday Thursday Friday Saturday             1     2     3     4     5       6      7     [facility] LAB DRAW    8:00 AM   (15 min.)    [facility] LAB DRAW   [facility] Lab Draw     [facility] RETURN    8:15 AM   (30 min.)   Kadi Dee MD   [facility] ONC INFUSION 240   10:00 AM   (240 min.)    ONCOLOGY INFUSION   [facility] 8     9     10     11     12       13     14     15     16     17     18     19       20     21     [facility] NEW ATRIAL FIBRILLATION    3:45 PM   (40 min.)   Romero Guerrero MD   [facility] 22     [facility] LAB DRAW    8:00 AM   (15 min.)    [facility] LAB DRAW   [facility] Lab Draw     [facility] RETURN    8:15 AM   (50 min.)   Katalina Ramirez PA-C   [facility] ONC INFUSION 240    9:30 AM   (240 min.)    ONCOLOGY INFUSION   [facility] 23     24     25     26       27     28     29     30     31  Happy Birthday!                         No results found for this or any previous visit (from the past 24 hour(s)).

## 2017-07-27 NOTE — TELEPHONE ENCOUNTER
[facility] nurse reporting that when pump was set to disconnect at 10:30am there was a moderate amount of chemotherapy set to still infuse. [facility] is waiting several hours to allow for infusion of the all the chemo agent. They would like further instruction. Denied leakage. Patency checked. Advised [facility] to page Dr. Dee.

## 2017-08-01 ENCOUNTER — APPOINTMENT (OUTPATIENT)
Dept: LAB | Facility: CLINIC | Age: 57
End: 2017-08-01
Attending: INTERNAL MEDICINE
Payer: COMMERCIAL

## 2017-08-07 ENCOUNTER — ONCOLOGY VISIT (OUTPATIENT)
Dept: ONCOLOGY | Facility: CLINIC | Age: 57
End: 2017-08-07
Attending: INTERNAL MEDICINE
Payer: COMMERCIAL

## 2017-08-07 VITALS
TEMPERATURE: 98.2 F | DIASTOLIC BLOOD PRESSURE: 76 MMHG | WEIGHT: 315 LBS | BODY MASS INDEX: 41.69 KG/M2 | OXYGEN SATURATION: 97 % | SYSTOLIC BLOOD PRESSURE: 128 MMHG | HEART RATE: 68 BPM

## 2017-08-07 DIAGNOSIS — C15.5 CANCER OF DISTAL THIRD OF ESOPHAGUS (H): Primary | ICD-10-CM

## 2017-08-07 LAB
ALBUMIN SERPL-MCNC: 3.2 G/DL (ref 3.4–5)
ALP SERPL-CCNC: 98 U/L (ref 40–150)
ALT SERPL W P-5'-P-CCNC: 27 U/L (ref 0–70)
ANION GAP SERPL CALCULATED.3IONS-SCNC: 8 MMOL/L (ref 3–14)
AST SERPL W P-5'-P-CCNC: 27 U/L (ref 0–45)
BASOPHILS # BLD AUTO: 0 10E9/L (ref 0–0.2)
BASOPHILS NFR BLD AUTO: 0.3 %
BILIRUB SERPL-MCNC: 0.6 MG/DL (ref 0.2–1.3)
BUN SERPL-MCNC: 10 MG/DL (ref 7–30)
CALCIUM SERPL-MCNC: 9.2 MG/DL (ref 8.5–10.1)
CHLORIDE SERPL-SCNC: 109 MMOL/L (ref 94–109)
CO2 SERPL-SCNC: 24 MMOL/L (ref 20–32)
CREAT SERPL-MCNC: 0.82 MG/DL (ref 0.66–1.25)
DIFFERENTIAL METHOD BLD: ABNORMAL
EOSINOPHIL # BLD AUTO: 0 10E9/L (ref 0–0.7)
EOSINOPHIL NFR BLD AUTO: 1.2 %
ERYTHROCYTE [DISTWIDTH] IN BLOOD BY AUTOMATED COUNT: 17.5 % (ref 10–15)
GFR SERPL CREATININE-BSD FRML MDRD: ABNORMAL ML/MIN/1.7M2
GLUCOSE SERPL-MCNC: 107 MG/DL (ref 70–99)
HCT VFR BLD AUTO: 35.7 % (ref 40–53)
HGB BLD-MCNC: 12.5 G/DL (ref 13.3–17.7)
IMM GRANULOCYTES # BLD: 0 10E9/L (ref 0–0.4)
IMM GRANULOCYTES NFR BLD: 0.6 %
LYMPHOCYTES # BLD AUTO: 1 10E9/L (ref 0.8–5.3)
LYMPHOCYTES NFR BLD AUTO: 30.2 %
MCH RBC QN AUTO: 32.4 PG (ref 26.5–33)
MCHC RBC AUTO-ENTMCNC: 35 G/DL (ref 31.5–36.5)
MCV RBC AUTO: 93 FL (ref 78–100)
MONOCYTES # BLD AUTO: 0.4 10E9/L (ref 0–1.3)
MONOCYTES NFR BLD AUTO: 11.1 %
NEUTROPHILS # BLD AUTO: 1.8 10E9/L (ref 1.6–8.3)
NEUTROPHILS NFR BLD AUTO: 56.6 %
NRBC # BLD AUTO: 0 10*3/UL
NRBC BLD AUTO-RTO: 0 /100
PLATELET # BLD AUTO: 74 10E9/L (ref 150–450)
POTASSIUM SERPL-SCNC: 3.7 MMOL/L (ref 3.4–5.3)
PROT SERPL-MCNC: 7.2 G/DL (ref 6.8–8.8)
RBC # BLD AUTO: 3.86 10E12/L (ref 4.4–5.9)
SODIUM SERPL-SCNC: 140 MMOL/L (ref 133–144)
WBC # BLD AUTO: 3.2 10E9/L (ref 4–11)

## 2017-08-07 PROCEDURE — 96415 CHEMO IV INFUSION ADDL HR: CPT

## 2017-08-07 PROCEDURE — 96411 CHEMO IV PUSH ADDL DRUG: CPT

## 2017-08-07 PROCEDURE — 99212 OFFICE O/P EST SF 10 MIN: CPT | Mod: ZF

## 2017-08-07 PROCEDURE — 96375 TX/PRO/DX INJ NEW DRUG ADDON: CPT

## 2017-08-07 PROCEDURE — 85025 COMPLETE CBC W/AUTO DIFF WBC: CPT | Performed by: INTERNAL MEDICINE

## 2017-08-07 PROCEDURE — 96413 CHEMO IV INFUSION 1 HR: CPT

## 2017-08-07 PROCEDURE — 96368 THER/DIAG CONCURRENT INF: CPT

## 2017-08-07 PROCEDURE — 96367 TX/PROPH/DG ADDL SEQ IV INF: CPT

## 2017-08-07 PROCEDURE — 96416 CHEMO PROLONG INFUSE W/PUMP: CPT

## 2017-08-07 PROCEDURE — 80053 COMPREHEN METABOLIC PANEL: CPT | Performed by: INTERNAL MEDICINE

## 2017-08-07 PROCEDURE — 25000128 H RX IP 250 OP 636: Mod: ZF | Performed by: INTERNAL MEDICINE

## 2017-08-07 PROCEDURE — 99214 OFFICE O/P EST MOD 30 MIN: CPT | Mod: ZP | Performed by: INTERNAL MEDICINE

## 2017-08-07 RX ORDER — FLUOROURACIL 50 MG/ML
400 INJECTION, SOLUTION INTRAVENOUS ONCE
Status: COMPLETED | OUTPATIENT
Start: 2017-08-07 | End: 2017-08-07

## 2017-08-07 RX ORDER — HEPARIN SODIUM (PORCINE) LOCK FLUSH IV SOLN 100 UNIT/ML 100 UNIT/ML
5 SOLUTION INTRAVENOUS EVERY 8 HOURS
Status: DISCONTINUED | OUTPATIENT
Start: 2017-08-07 | End: 2017-08-09 | Stop reason: HOSPADM

## 2017-08-07 RX ORDER — ALBUTEROL SULFATE 0.83 MG/ML
2.5 SOLUTION RESPIRATORY (INHALATION)
Status: CANCELLED | OUTPATIENT
Start: 2017-08-07

## 2017-08-07 RX ORDER — ALBUTEROL SULFATE 90 UG/1
1-2 AEROSOL, METERED RESPIRATORY (INHALATION)
Status: CANCELLED
Start: 2017-08-07

## 2017-08-07 RX ORDER — PALONOSETRON 0.05 MG/ML
0.25 INJECTION, SOLUTION INTRAVENOUS ONCE
Status: COMPLETED | OUTPATIENT
Start: 2017-08-07 | End: 2017-08-07

## 2017-08-07 RX ORDER — METHYLPREDNISOLONE SODIUM SUCCINATE 125 MG/2ML
125 INJECTION, POWDER, LYOPHILIZED, FOR SOLUTION INTRAMUSCULAR; INTRAVENOUS
Status: CANCELLED
Start: 2017-08-07

## 2017-08-07 RX ORDER — EPINEPHRINE 0.3 MG/.3ML
INJECTION SUBCUTANEOUS
Status: DISCONTINUED
Start: 2017-08-07 | End: 2017-08-07 | Stop reason: WASHOUT

## 2017-08-07 RX ORDER — LORAZEPAM 2 MG/ML
0.5 INJECTION INTRAMUSCULAR EVERY 4 HOURS PRN
Status: CANCELLED
Start: 2017-08-07

## 2017-08-07 RX ORDER — MEPERIDINE HYDROCHLORIDE 25 MG/ML
25 INJECTION INTRAMUSCULAR; INTRAVENOUS; SUBCUTANEOUS EVERY 30 MIN PRN
Status: CANCELLED | OUTPATIENT
Start: 2017-08-07

## 2017-08-07 RX ORDER — EPINEPHRINE 1 MG/ML
0.3 INJECTION INTRAMUSCULAR; INTRAVENOUS; SUBCUTANEOUS EVERY 5 MIN PRN
Status: CANCELLED | OUTPATIENT
Start: 2017-08-07

## 2017-08-07 RX ORDER — EPINEPHRINE 0.3 MG/.3ML
0.3 INJECTION SUBCUTANEOUS EVERY 5 MIN PRN
Status: CANCELLED | OUTPATIENT
Start: 2017-08-07

## 2017-08-07 RX ORDER — ZOLPIDEM TARTRATE 6.25 MG/1
6.25 TABLET, FILM COATED, EXTENDED RELEASE ORAL
Qty: 60 TABLET | Refills: 0 | Status: SHIPPED | OUTPATIENT
Start: 2017-08-07 | End: 2017-10-30

## 2017-08-07 RX ORDER — PALONOSETRON 0.05 MG/ML
0.25 INJECTION, SOLUTION INTRAVENOUS ONCE
Status: CANCELLED | OUTPATIENT
Start: 2017-08-07 | End: 2017-08-07

## 2017-08-07 RX ORDER — SODIUM CHLORIDE 9 MG/ML
1000 INJECTION, SOLUTION INTRAVENOUS CONTINUOUS PRN
Status: CANCELLED
Start: 2017-08-07

## 2017-08-07 RX ORDER — DIPHENHYDRAMINE HYDROCHLORIDE 50 MG/ML
50 INJECTION INTRAMUSCULAR; INTRAVENOUS
Status: CANCELLED
Start: 2017-08-07

## 2017-08-07 RX ORDER — FLUOROURACIL 50 MG/ML
400 INJECTION, SOLUTION INTRAVENOUS ONCE
Status: CANCELLED | OUTPATIENT
Start: 2017-08-07

## 2017-08-07 RX ADMIN — FLUOROURACIL 1220 MG: 50 INJECTION, SOLUTION INTRAVENOUS at 12:51

## 2017-08-07 RX ADMIN — LEUCOVORIN CALCIUM 1050 MG: 350 INJECTION, POWDER, LYOPHILIZED, FOR SOLUTION INTRAMUSCULAR; INTRAVENOUS at 10:40

## 2017-08-07 RX ADMIN — DEXAMETHASONE SODIUM PHOSPHATE: 10 INJECTION, SOLUTION INTRAMUSCULAR; INTRAVENOUS at 09:54

## 2017-08-07 RX ADMIN — DEXTROSE MONOHYDRATE 250 ML: 50 INJECTION, SOLUTION INTRAVENOUS at 09:54

## 2017-08-07 RX ADMIN — OXALIPLATIN 207 MG: 5 INJECTION, SOLUTION, CONCENTRATE INTRAVENOUS at 10:40

## 2017-08-07 RX ADMIN — SODIUM CHLORIDE, PRESERVATIVE FREE 5 ML: 5 INJECTION INTRAVENOUS at 08:23

## 2017-08-07 RX ADMIN — SODIUM CHLORIDE 150 MG: 9 INJECTION, SOLUTION INTRAVENOUS at 10:12

## 2017-08-07 RX ADMIN — PALONOSETRON HYDROCHLORIDE 0.25 MG: 0.25 INJECTION INTRAVENOUS at 09:54

## 2017-08-07 NOTE — NURSING NOTE
Chief Complaint   Patient presents with     Oncology Clinic Visit     esphageal ca      Port Draw     Labs collected via portacath.

## 2017-08-07 NOTE — LETTER
8/7/2017       RE: Reuben Padilla  [address]   [place] [address]     Dear Colleague,    Thank you for referring your patient, Reuben Padilla, to the [facility]. Please see a copy of my visit note below.    Oncology Visit:  Date on this visit: August 7, 2017       HISTORY OF PRESENT ILLNESS:  The patient reports that initially he noticed symptoms of dysphagia, mainly to solid food, starting in February of 2017.  Symptoms got progressively worse.  Currently, he is unable to eat solid food, and he needs to take a lot of fluid to push food down.  His dysphagia symptoms prompted subsequent evaluation with upper endoscopy and colonoscopy that was done in [place]. The colonoscopy revealed two tubular adenomas in the colon.  The upper endoscopy revealed an ulcerated mass 1-2 cm long, approximately 40 cm from the lips.  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  The patient also had a PET/CT scan done that revealed widespread lavon metastasis and multiple liver mets that were consistent with metastatic disease. Her 2 negative.      He returns today after six cycles of FOLFOX chemotherapy.  He is feeling much better.  He is eating much better and is no longer having dysphagia.  He has had some difficulty with nausea and constipation during his chemotherapy.  With the addition of Emend, he is doing much better.    He has grade 1 neuropathy.  He continues to work FT in [place].  He reports fatigue days 4-5 and wants to rest most of these days.  Overall, he thinks his cold sensitivity is worse with cold sensitivity lingering for most days of the cycle.      ROS: 10 point ROS neg other than the symptoms noted above in the HPI.       His past medical history is significant only for isolated intermittent atrial fibrillation for which he is intermittently taking a baby aspirin.  He denies any other significant past medical  history     SOCIAL HISTORY:  He has a remote history of smoking, about 20-pack years; he quit 10 years ago.  He denies alcohol or illicit drugs.  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  He has a sister who lives in the city, and he has a 22-year-old son.      REVIEW OF SYSTEMS:  A complete 12 point review of systems is negative, except as documented above.            Past Medical/Surgical History:  Past Medical History:   Diagnosis Date     Atrial fibrillation (H)      Cancer (H)     esophageal     Glaucoma      Glaucoma      Past Surgical History:   Procedure Laterality Date     AMPUTATION      toe     ARTHROSCOPY KNEE       bunion surgery       CHOLECYSTECTOMY       INSERT PORT VASCULAR ACCESS Right 5/9/2017    Procedure: INSERT PORT VASCULAR ACCESS;  Single Lumen Chest Power Port;  Surgeon: Jasiel Hu PA-C;  Location:  OR   Allergies:  Allergies as of 08/07/2017 - David as Reviewed 08/07/2017   Allergen Reaction Noted     Penicillin g Other (See Comments) 01/19/2017     Penicillins Unknown 04/27/2017     Current Medications:  Current Outpatient Prescriptions   Medication Sig Dispense Refill     zolpidem (AMBIEN CR) 12.5 MG CR tablet Take 1 tablet (12.5 mg) by mouth nightly as needed for sleep 30 tablet 1     Rivaroxaban (XARELTO STARTER PACK) 15 & 20 MG TBPK Take 15 mg by mouth 2 times daily For 21 days and then 20mg daily 51 each 0     ondansetron (ZOFRAN) 8 MG tablet Take 1 tablet (8 mg) by mouth every 8 hours as needed (Nausea/Vomiting) 30 tablet 2     lidocaine-prilocaine (EMLA) cream Apply topically as needed for moderate pain 30 g 3     prochlorperazine (COMPAZINE) 10 MG tablet Take 1 tablet (10 mg) by mouth every 6 hours as needed (Nausea/Vomiting) 30 tablet 2     LORazepam (ATIVAN) 0.5 MG tablet Take 1 tablet (0.5 mg) by mouth every 6 hours as needed for anxiety 60 tablet 1     multivitamin, therapeutic with minerals (MULTI-VITAMIN) TABS tablet Take 1 tablet by mouth daily       fish  oil-omega-3 fatty acids 1000 MG capsule Take 2 g by mouth daily       IBUPROFEN PO Take 800 mg by mouth every 8 hours as needed for moderate pain       latanoprost (XALATAN) 0.005 % ophthalmic solution Place into both eyes At Bedtime       timolol (TIMOPTIC) 0.5 % ophthalmic solution Place into both eyes daily        Family History:    Maternal grandmother had breast cancer diagnosed at her 50's.Paternal aunt had some gynecologic cancer    Social History:  Social History     Social History     Marital status: Single     Spouse name: N/A     Number of children: N/A     Years of education: N/A     Occupational History     Not on file.     Social History Main Topics     Smoking status: Former Smoker     Smokeless tobacco: Never Used     Alcohol use Yes      Comment: occasional     Drug use: No      Comment: h/o over 30 years ago     Sexual activity: Not on file     Other Topics Concern     Not on file     Social History Narrative     Physical Exam:  /76  Pulse 68  Temp 98.2  F (36.8  C) (Oral)  Wt (!) 163.6 kg (360 lb 11.2 oz)  SpO2 97%  BMI 41.69 kg/m2    GENERAL APPEARANCE: healthy, alert and no distress     HENT: Mouth without ulcers or lesions     NECK: no adenopathy, no asymmetry or masses     LYMPHATICS: No cervical, supraclavicular, axillary or inguinal lymphadenopathy appreciated     RESP: lungs clear to auscultation - no rales, rhonchi or wheezes     CARDIOVASCULAR: regular rates and rhythm, normal S1 S2, no S3 or S4 and no murmur.     ABDOMEN:  soft, nontender, no HSM or masses and bowel sounds normal     MUSCULOSKELETAL: No edema b/l LE.     SKIN: no suspicious lesions or rashes     PSYCHIATRIC: mentation appears normal and affect normal     Lab Results   Component Value Date    WBC 3.2 08/07/2017     Lab Results   Component Value Date    RBC 3.86 08/07/2017     Lab Results   Component Value Date    HGB 12.5 08/07/2017     Lab Results   Component Value Date    HCT 35.7 08/07/2017     No components  found for: MCT  Lab Results   Component Value Date    MCV 93 08/07/2017     Lab Results   Component Value Date    MCH 32.4 08/07/2017     Lab Results   Component Value Date    MCHC 35.0 08/07/2017     Lab Results   Component Value Date    RDW 17.5 08/07/2017     Lab Results   Component Value Date    PLT 74 08/07/2017       Recent Labs   Lab Test  08/07/17   0826  07/25/17   0820   NA  140  140   POTASSIUM  3.7  3.7   CHLORIDE  109  108   CO2  24  25   ANIONGAP  8  8   GLC  107*  104*   BUN  10  11   CR  0.82  0.89   NIDHI  9.2  9.1     Liver Function Studies -   Recent Labs   Lab Test  08/07/17   0826   PROTTOTAL  7.2   ALBUMIN  3.2*   BILITOTAL  0.6   ALKPHOS  98   AST  27   ALT  27       ASSESSMENT/PLAN:  1.  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  This corresponds to stage IV cancer.      He is tolerating FOLFOX well and has had a good partial response to his treatment.  Will continue FOLFOX.  Will reduce oxaliplatin to 80% given neuropathy and thrombocytopenia.   Neuropathy will need to be monitored closely.  I discussed with him that I would like him to have restaging in 4 weeks.  Will arrange for this imaging.    2. Nausea -improved on Emend and decadron.    3.  A-Fib.  He seems to have intermittent a-fib. Currently asymptomatic and in sinus rhythm.  He is advised to continue aspirin.     4.  incidental PE identified on imaging in June 2017 -  Pt remains on Xarelto.       Kadi Dee MD

## 2017-08-07 NOTE — MR AVS SNAPSHOT
After Visit Summary   8/7/2017    Reuben Padilla    MRN: [id number]           Patient Information     Date Of Birth          1960        Visit Information        Provider Department      8/7/2017 10:00 AM UC 17 ATC; UC ONCOLOGY INFUSION [facility]        Today's Diagnoses     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)    -  1      Care Instructions    Contact numbers:  Triage Main Line: [phone]  After hours: [phone]    Call with chills and/or temperature greater than or equal to 100.5 and questions or concerns.    If after hours, weekends, or holidays, call main hospital  at  [phone] and ask for Oncology doctor on call.           August 2017 Sunday Monday Tuesday Wednesday Thursday Friday Saturday             1     LAB    2:45 PM   (15 min.)   UR LAB HOME INFUSION   [facility], Laboratory Services 2     3     4     5       6     7     P [facility] DRAW    8:00 AM   (15 min.)    [facility] LAB DRAW   [facility] Lab Draw     [facility] RETURN    8:15 AM   (30 min.)   Kadi Dee MD   [facility] ONC INFUSION 240   10:00 AM   (240 min.)   UC ONCOLOGY INFUSION   [facility] 8     9     10     11     12       13     14     15     16     17     18     19       20     21     P NEW ATRIAL FIBRILLATION    3:45 PM   (40 min.)   Romero Guerrero MD   [facility] 22     [facility] LAB DRAW    8:00 AM   (15 min.)    [facility] DRAW   [facility] Lab Draw     [facility] RETURN    8:15 AM   (50 min.)   Katalina Ramirez PA-C   [facility] ONC INFUSION 240    9:30 AM   (240 min.)   UC ONCOLOGY INFUSION   [facility] 23     24     25     26       27     28     29     30     31  Happy Birthday!                          September 2017 Sunday Monday Tuesday Wednesday Thursday Friday Saturday                            1     2       3     4      5     [facility]    8:15 AM   (15 min.)   [facility] LAB DRAW   [facility] Lab Draw     CT CHEST/ABDOMEN/PELVIS W    8:45 AM   (20 min.)   UCCT2   [facility] CT     [facility] RETURN   11:15 AM   (30 min.)   Kadi Dee MD   [facility] ONC INFUSION 240    1:00 PM   (240 min.)    ONCOLOGY INFUSION   [facility] 6     7     8     9       10     11     12     13     14     15     16       17     18     19     20     21     22     23       24     25     26     27     28     29     30                  Lab Results:  Recent Results (from the past 12 hour(s))   CBC with platelets differential    Collection Time: 08/07/17  8:26 AM   Result Value Ref Range    WBC 3.2 (L) 4.0 - 11.0 10e9/L    RBC Count 3.86 (L) 4.4 - 5.9 10e12/L    Hemoglobin 12.5 (L) 13.3 - 17.7 g/dL    Hematocrit 35.7 (L) 40.0 - 53.0 %    MCV 93 78 - 100 fl    MCH 32.4 26.5 - 33.0 pg    MCHC 35.0 31.5 - 36.5 g/dL    RDW 17.5 (H) 10.0 - 15.0 %    Platelet Count 74 (L) 150 - 450 10e9/L    Diff Method Automated Method     % Neutrophils 56.6 %    % Lymphocytes 30.2 %    % Monocytes 11.1 %    % Eosinophils 1.2 %    % Basophils 0.3 %    % Immature Granulocytes 0.6 %    Nucleated RBCs 0 0 /100    Absolute Neutrophil 1.8 1.6 - 8.3 10e9/L    Absolute Lymphocytes 1.0 0.8 - 5.3 10e9/L    Absolute Monocytes 0.4 0.0 - 1.3 10e9/L    Absolute Eosinophils 0.0 0.0 - 0.7 10e9/L    Absolute Basophils 0.0 0.0 - 0.2 10e9/L    Abs Immature Granulocytes 0.0 0 - 0.4 10e9/L    Absolute Nucleated RBC 0.0    Comprehensive metabolic panel    Collection Time: 08/07/17  8:26 AM   Result Value Ref Range    Sodium 140 133 - 144 mmol/L    Potassium 3.7 3.4 - 5.3 mmol/L    Chloride 109 94 - 109 mmol/L    Carbon Dioxide 24 20 - 32 mmol/L    Anion Gap 8 3 - 14 mmol/L    Glucose 107 (H) 70 - 99 mg/dL    Urea Nitrogen 10 7 - 30 mg/dL    Creatinine 0.82 0.66 - 1.25 mg/dL    GFR Estimate >90  Non  GFR  Calc   >60 mL/min/1.7m2    GFR Estimate If Black >90   GFR Calc   >60 mL/min/1.7m2    Calcium 9.2 8.5 - 10.1 mg/dL    Bilirubin Total 0.6 0.2 - 1.3 mg/dL    Albumin 3.2 (L) 3.4 - 5.0 g/dL    Protein Total 7.2 6.8 - 8.8 g/dL    Alkaline Phosphatase 98 40 - 150 U/L    ALT 27 0 - 70 U/L    AST 27 0 - 45 U/L               Follow-ups after your visit        Your next 10 appointments already scheduled     Aug 21, 2017  4:00 PM CDT   (Arrive by 3:45 PM)   ATRIAL FIBULATION VISIT with Romero Guerrero MD   [facility] ([facility])    [address] [place] [address]  3rd [facility] [id number] [phone]            Aug 22, 2017  8:00 AM CDT   [facility] Draw with  [facility] LAB DRAW   [facility] Lab Draw ([facility])    [address] [place] [address]  2nd [facility] [id number] [phone]            Aug 22, 2017  8:30 AM CDT   (Arrive by 8:15 AM)   Return Visit with Katalina Ramirez PA-C   [facility] ([facility])    [address] [place] [address]  2nd [facility] [id number] [phone]            Aug 22, 2017  9:30 AM CDT   Infusion 240 with UC ONCOLOGY INFUSION, UC 16 ATC   [facility] ([facility])    [address] [place] [address]  2nd [facility] [id number] [phone]            Sep 05, 2017  8:15 AM CDT   [facility] Lab Draw with UC [facility] LAB DRAW   [facility] Lab Draw ([facility])    [address] [place] [address]  2nd [facility] [id number] [phone]            Sep 05, 2017  9:00 AM CDT   (Arrive by 8:45 AM)   CT CHEST/ABDOMEN/PELVIS W CONTRAST with UCCT2   [facility] Imaging [place] [facility] ([facility])    [address] [place] [address]  1st [facility] [id number] [phone]           Please bring any scans or X-rays taken at other hospitals, if similar tests were done.  Also bring a list of your medicines, including vitamins, minerals and over-the-counter drugs. It is safest to leave personal items at home.  Be sure to tell your doctor:   If you have any allergies.   If there s any chance you are pregnant.   If you are breastfeeding.   If you have any special needs.  You may have contrast for this exam. To prepare:   Do not eat or drink for 2 hours before your exam. If you need to take medicine, you may take it with small sips of water. (We may ask you to take liquid medicine as well.)   The day before your exam, drink extra fluids at least six 8-ounce glasses (unless your doctor tells you to restrict your fluids).  Patients over 70 or patients with diabetes or kidney problems:   If you haven t had a blood test (creatinine test) within the last 30 days, go to your clinic or Diagnostic Imaging Department for this test.  If you have diabetes:   If your kidney function is normal, continue taking your metformin (Avandamet, Glucophage, Glucovance, Metaglip) on the day of your exam.   If your kidney function is abnormal, wait 48 hours before restarting this medicine.  You will have oral contrast for this exam:   You will drink the contrast at home. Get this from your clinic or Diagnostic Imaging Department. Please follow the directions given.  Please wear loose clothing, such as a sweat suit or jogging clothes. Avoid snaps, zippers and other metal. We may ask you to undress and put on a hospital gown.  If you have any questions, please call the Imaging Department where you will have your exam.            Sep 05, 2017 11:30 AM CDT   (Arrive by 11:15 AM)   Return Visit with Kadi Dee MD   [facility] ([facility])    [address] [facility]  2nd [facility] [id number] [phone]            Sep 05, 2017  1:00 PM CDT   Infusion 240 with UC ONCOLOGY INFUSION, UC 10 ATC   [facility] ([facility])    [address] [facility]  2nd [facility] [address] [phone]              Future tests that were ordered for you today     Open Future Orders        Priority Expected Expires Ordered    CT Chest/Abdomen/Pelvis w Contrast Routine  3/5/2018 8/7/2017            Who to contact     If you have questions or need follow up information about today's clinic visit or your schedule please contact [facility] CANCER [facility] directly at [phone].  Normal or non-critical lab and imaging results will be communicated to you by MD [employer], letter or phone within 4 business days after the clinic has received the results. If you do not hear from us within 7 days, please contact the clinic through [employer] or phone. If you have a critical or abnormal lab result, we will notify you by phone as soon as possible.  Submit refill requests through [employer] or call your pharmacy and they will forward the refill request to us. Please allow 3 business days for your refill to be completed.          Additional Information About Your Visit        [employer] Information     [employer] gives you secure access to your electronic health record. If you see a primary care provider, you can also send messages to your care team and make appointments. If you have questions, please call your primary care clinic.  If you do not have a primary care provider, please call [phone] and they will assist you.        Care EveryWhere ID     This is your Care EveryWhere ID. This could be used by other organizations to access your [place] medical records  [id number]         Blood Pressure from Last 3 Encounters:   08/07/17 128/76   07/27/17 142/81   07/25/17 119/76    Weight from Last 3 Encounters:   08/07/17 (!) 163.6 kg (360 lb 11.2 oz)   07/27/17 (!) 164.5 kg (362 lb 9.6 oz)   07/25/17 (!) 162.8 kg (359 lb)              We Performed the Following     CBC with platelets differential     Comprehensive metabolic panel          Today's  Medication Changes          These changes are accurate as of: 8/7/17 12:06 PM.  If you have any questions, ask your nurse or doctor.               These medicines have changed or have updated prescriptions.        Dose/Directions    * zolpidem 12.5 MG CR tablet   Commonly known as:  AMBIEN CR   This may have changed:  Another medication with the same name was added. Make sure you understand how and when to take each.   Used for:  Insomnia, unspecified type   Changed by:  Katalina Ramirez PA-C        Dose:  12.5 mg   Take 1 tablet (12.5 mg) by mouth nightly as needed for sleep   Quantity:  30 tablet   Refills:  1       * zolpidem 6.25 MG CR tablet   Commonly known as:  AMBIEN CR   This may have changed:  You were already taking a medication with the same name, and this prescription was added. Make sure you understand how and when to take each.   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 Read the directions carefully, and ask your doctor or other care provider to review them with you.         Where to get your medicines      Some of these will need a paper prescription and others can be bought over the counter.  Ask your nurse if you have questions.     Bring a paper prescription for each of these medications     zolpidem 6.25 MG CR tablet                Primary Care Provider    Unknown Primary MD Sydney       No address on file        Equal Access to Services     [facility] AH: Hadii antonio navarroo Sonigel, waaxda luqadaha, qaybta kaalmada adelaurynyajuancarlos, geraldo [place] . So [facility] [phone].    ATENCIÓN: Si habla español, tiene a alamo disposición servicios gratuitos de asistencia lingüística. Llame al  [phone].    We comply with applicable federal civil rights laws and [place] laws. We do not discriminate on the basis of race, color, national origin, age, disability sex, sexual orientation or gender identity.            Thank you!     Thank you for choosing [facility]  for your care. Our goal is always to provide you with excellent care. Hearing back from our patients is one way we can continue to improve our services. Please take a few minutes to complete the written survey that you may receive in the mail after your visit with us. Thank you!             Your Updated Medication List - Protect others around you: Learn how to safely use, store and throw away your medicines at www.disposemymeds.org.          This list is accurate as of: 8/7/17 12:06 PM.  Always use your most recent med list.                   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)       LORazepam 0.5 MG tablet    ATIVAN    60 tablet    Take 1 tablet (0.5 mg) by mouth every 6 hours as needed for anxiety    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), 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       Multi-vitamin Tabs tablet      Take 1 tablet by mouth daily        ondansetron 8 MG tablet    ZOFRAN    30 tablet    Take 1 tablet (8 mg) by mouth every 8 hours as needed (Nausea/Vomiting)    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)       prochlorperazine 10 MG tablet    COMPAZINE    30 tablet    Take 1 tablet (10 mg) by mouth every 6 hours as needed (Nausea/Vomiting)     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)       Rivaroxaban 15 & 20 MG Tbpk    XARELTO STARTER PACK    51 each    Take 15 mg by mouth 2 times daily For 21 days and then 20mg daily    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), 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       timolol 0.5 % ophthalmic solution    TIMOPTIC     Place into both eyes daily    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 Read the directions carefully, and ask your doctor or other care provider to review them with you.

## 2017-08-07 NOTE — MR AVS SNAPSHOT
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)    -  1       Follow-ups after your visit        Your next 10 appointments already scheduled     Aug 21, 2017  4:00 PM CDT   (Arrive by 3:45 PM)   ATRIAL FIBULATION VISIT with Romero Guerrero MD   [facility] ([facility])    [address] [place] [address]  3rd [facility] [address]   [phone]            Aug 22, 2017  8:00 AM CDT   [facility] Lab Draw with [facility] DRAW   [facility] Draw ([facility])    [address] [place] [address]  2nd [facility] [address]   [phone]            Aug 22, 2017  8:30 AM CDT   (Arrive by 8:15 AM)   Return Visit with Katalina Ramirez PA-C   [facility] ([facility])    [address] [place] [address]  2nd [facility] [address]   [phone]            Aug 22, 2017  9:30 AM CDT   Infusion 240 with UC ONCOLOGY INFUSION, UC 16 ATC   [facility] ([facility])    [address] [place] [address]  2nd [facility] [address]   [phone]            Sep 05, 2017  8:15 AM CDT   [facility] Lab Draw with [facility] LAB DRAW   [facility] Lab Draw ([facility])    [address] [place] [address]  2nd [facility] [address]   [phone]            Sep 05, 2017  9:00 AM CDT   (Arrive by 8:45 AM)   CT CHEST/ABDOMEN/PELVIS W CONTRAST with UCCT2   [facility] [place] [facility] ([facility])    [address] [place] [address]  1st [facility] [address]   [phone]           Please bring any scans or X-rays taken at other hospitals, if  similar tests were done. Also bring a list of your medicines, including vitamins, minerals and over-the-counter drugs. It is safest to leave personal items at home.  Be sure to tell your doctor:   If you have any allergies.   If there s any chance you are pregnant.   If you are breastfeeding.   If you have any special needs.  You may have contrast for this exam. To prepare:   Do not eat or drink for 2 hours before your exam. If you need to take medicine, you may take it with small sips of water. (We may ask you to take liquid medicine as well.)   The day before your exam, drink extra fluids at least six 8-ounce glasses (unless your doctor tells you to restrict your fluids).  Patients over 70 or patients with diabetes or kidney problems:   If you haven t had a blood test (creatinine test) within the last 30 days, go to your clinic or Diagnostic Imaging Department for this test.  If you have diabetes:   If your kidney function is normal, continue taking your metformin (Avandamet, Glucophage, Glucovance, Metaglip) on the day of your exam.   If your kidney function is abnormal, wait 48 hours before restarting this medicine.  You will have oral contrast for this exam:   You will drink the contrast at home. Get this from your clinic or Diagnostic Imaging Department. Please follow the directions given.  Please wear loose clothing, such as a sweat suit or jogging clothes. Avoid snaps, zippers and other metal. We may ask you to undress and put on a hospital gown.  If you have any questions, please call the Imaging Department where you will have your exam.            Sep 05, 2017 11:30 AM CDT   (Arrive by 11:15 AM)   Return Visit with Kadi Dee MD   [facility] ([facility])    [address] [address] [phone]            Sep 05, 2017  1:00 PM CDT   Infusion 240 with  ONCOLOGY INFUSION,  10 ATC   [facility] (  [facility])    [address] [facility]  2nd Floor  [facility] [address] [phone]              Who to contact     If you have questions or need follow up information about today's clinic visit or your schedule please contact [facility] directly at [phone].  Normal or non-critical lab and imaging results will be communicated to you by MyChart, letter or phone within 4 business days after the clinic has received the results. If you do not hear from us within 7 days, please contact the clinic through TimePointshart or phone. If you have a critical or abnormal lab result, we will notify you by phone as soon as possible.  Submit refill requests through Plerts or call your pharmacy and they will forward the refill request to us. Please allow 3 business days for your refill to be completed.          Additional Information About Your Visit        MyChart Information     Plerts gives you secure access to your electronic health record. If you see a primary care provider, you can also send messages to your care team and make appointments. If you have questions, please call your primary care clinic.  If you do not have a primary care provider, please call [phone] and they will assist you.        Care EveryWhere ID     This is your Care EveryWhere ID. This could be used by other organizations to access your [place] medical records  [id number]        Your Vitals Were     Pulse Temperature Pulse Oximetry BMI (Body Mass Index)          68 98.2  F (36.8  C) (Oral) 97% 41.69 kg/m2         Blood Pressure from Last 3 Encounters:   08/07/17 128/76   07/27/17 142/81   07/25/17 119/76    Weight from Last 3 Encounters:   08/07/17 (!) 163.6 kg (360 lb 11.2 oz)   07/27/17 (!) 164.5 kg (362 lb 9.6 oz)   07/25/17 (!) 162.8 kg (359 lb)                 Today's Medication Changes          These changes are accurate as of: 8/7/17 11:59 PM.  If you have any questions, ask your nurse or  doctor.               These medicines have changed or have updated prescriptions.        Dose/Directions    * zolpidem 12.5 MG CR tablet   Commonly known as:  AMBIEN CR   This may have changed:  Another medication with the same name was added. Make sure you understand how and when to take each.   Used for:  Insomnia, unspecified type   Changed by:  Katalina Ramirez PA-C        Dose:  12.5 mg   Take 1 tablet (12.5 mg) by mouth nightly as needed for sleep   Quantity:  30 tablet   Refills:  1       * zolpidem 6.25 MG CR tablet   Commonly known as:  AMBIEN CR   This may have changed:  You were already taking a medication with the same name, and this prescription was added. Make sure you understand how and when to take each.   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 Read the directions carefully, and ask your doctor or other care provider to review them with you.         Where to get your medicines      Some of these will need a paper prescription and others can be bought over the counter.  Ask your nurse if you have questions.     Bring a paper prescription for each of these medications     zolpidem 6.25 MG CR tablet                Primary Care Provider    Unknown Primary MD Sydney       No address on file        Equal Access to Services     CARLITOS SHARPE : Hadshahana navarroo Sonigel, waaxda luqadaha, qaybta kaalmada geraldo sotelo. So [facility] [phone].    ATENCIÓN: Si habla español, tiene a alamo disposición servicios gratuitos de asistencia lingüística. Tanisha al [phone].    We comply with applicable federal civil rights laws and [place] laws. We do not discriminate on the basis of race, color,  national origin, age, disability sex, sexual orientation or gender identity.            Thank you!     Thank you for choosing [facility]  for your care. Our goal is always to provide you with excellent care. Hearing back from our patients is one way we can continue to improve our services. Please take a few minutes to complete the written survey that you may receive in the mail after your visit with us. Thank you!             Your Updated Medication List - Protect others around you: Learn how to safely use, store and throw away your medicines at www.disposemymeds.org.          This list is accurate as of: 8/7/17 11:59 PM.  Always use your most recent med list.                   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)       LORazepam 0.5 MG tablet    ATIVAN    60 tablet    Take 1 tablet (0.5 mg) by mouth every 6 hours as needed for anxiety    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), 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       Multi-vitamin Tabs tablet      Take 1 tablet by mouth daily        ondansetron 8 MG tablet    ZOFRAN    30 tablet    Take 1 tablet (8 mg) by mouth every 8 hours as needed (Nausea/Vomiting)    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)       prochlorperazine 10 MG tablet    COMPAZINE    30 tablet    Take 1 tablet (10 mg) by mouth every 6 hours as needed (Nausea/Vomiting)    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)       Rivaroxaban 15 & 20 MG Tbpk    XARELTO STARTER PACK    51 each    Take 15 mg by mouth 2 times  daily For 21 days and then 20mg daily    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), 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       timolol 0.5 % ophthalmic solution    TIMOPTIC     Place into both eyes daily    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 Read the directions carefully, and ask your doctor or other care provider to review them with you.

## 2017-08-07 NOTE — PATIENT INSTRUCTIONS
Contact numbers:  Triage Main Line: [phone]  After hours: [phone]    Call with chills and/or temperature greater than or equal to 100.5 and questions or concerns.    If after hours, weekends, or holidays, call main hospital  at  [phone] and ask for Oncology doctor on call.           August 2017 Sunday Monday Tuesday Wednesday Thursday Friday Saturday             1     LAB    2:45 PM   (15 min.)   UR LAB HOME INFUSION   [facility] 2     3     4     5       6     7     P [facility] LAB DRAW    8:00 AM   (15 min.)   [facility] DRAW   [facility] Lab Draw     [facility] RETURN    8:15 AM   (30 min.)   [facility]   10:00 AM   (240 min.)   UC ONCOLOGY INFUSION   [facility] 8     9     10     11     12       13     14     15     16     17     18     19       20     21     [facility] NEW ATRIAL FIBRILLATION    3:45 PM   (40 min.)   Romero Guerrero MD   [facility] 22     [facility] DRAW    8:00 AM   (15 min.)   [facility] DRAW   [facility] Lab Draw     [facility] RETURN    8:15 AM   (50 min.)   Katal[facility] Ramirez [facility]-C   [facility]    9:30 AM   (240 min.)   UC ONCOLOGY INFUSION   [facility] 23     24     25     26       27     28     29     30     31  Happy Birthday!                          September 2017 Sunday Monday Tuesday Wednesday Thursday Friday Saturday                            1     2       3     4     5     P [facility] LAB DRAW    8:15 AM   (15 min.)   [facility] DRAW   [facility] Lab Draw     CT CHEST/ABDOMEN/PELVIS W    8:45 AM   (20 min.)   UCCT2   [facility]     [facility] RETURN   11:15 AM   (30 min.)   Kadi Dee MD   [facility]     [facility] ONC INFUSION 240    1:00 PM   (240 min.)   UC ONCOLOGY INFUSION   [facility]  6     7     8     9       10     11     12     13     14     15     16       17     18     19     20     21     22     23       24     25     26     27     28     29     30                  Lab Results:  Recent Results (from the past 12 hour(s))   CBC with platelets differential    Collection Time: 08/07/17  8:26 AM   Result Value Ref Range    WBC 3.2 (L) 4.0 - 11.0 10e9/L    RBC Count 3.86 (L) 4.4 - 5.9 10e12/L    Hemoglobin 12.5 (L) 13.3 - 17.7 g/dL    Hematocrit 35.7 (L) 40.0 - 53.0 %    MCV 93 78 - 100 fl    MCH 32.4 26.5 - 33.0 pg    MCHC 35.0 31.5 - 36.5 g/dL    RDW 17.5 (H) 10.0 - 15.0 %    Platelet Count 74 (L) 150 - 450 10e9/L    Diff Method Automated Method     % Neutrophils 56.6 %    % Lymphocytes 30.2 %    % Monocytes 11.1 %    % Eosinophils 1.2 %    % Basophils 0.3 %    % Immature Granulocytes 0.6 %    Nucleated RBCs 0 0 /100    Absolute Neutrophil 1.8 1.6 - 8.3 10e9/L    Absolute Lymphocytes 1.0 0.8 - 5.3 10e9/L    Absolute Monocytes 0.4 0.0 - 1.3 10e9/L    Absolute Eosinophils 0.0 0.0 - 0.7 10e9/L    Absolute Basophils 0.0 0.0 - 0.2 10e9/L    Abs Immature Granulocytes 0.0 0 - 0.4 10e9/L    Absolute Nucleated RBC 0.0    Comprehensive metabolic panel    Collection Time: 08/07/17  8:26 AM   Result Value Ref Range    Sodium 140 133 - 144 mmol/L    Potassium 3.7 3.4 - 5.3 mmol/L    Chloride 109 94 - 109 mmol/L    Carbon Dioxide 24 20 - 32 mmol/L    Anion Gap 8 3 - 14 mmol/L    Glucose 107 (H) 70 - 99 mg/dL    Urea Nitrogen 10 7 - 30 mg/dL    Creatinine 0.82 0.66 - 1.25 mg/dL    GFR Estimate >90  Non  GFR Calc   >60 mL/min/1.7m2    GFR Estimate If Black >90   GFR Calc   >60 mL/min/1.7m2    Calcium 9.2 8.5 - 10.1 mg/dL    Bilirubin Total 0.6 0.2 - 1.3 mg/dL    Albumin 3.2 (L) 3.4 - 5.0 g/dL    Protein Total 7.2 6.8 - 8.8 g/dL    Alkaline Phosphatase 98 40 - 150 U/L    ALT 27 0 - 70 U/L    AST 27 0 - 45 U/L

## 2017-08-07 NOTE — PROGRESS NOTES
"Infusion Nursing Note:  Reuben Padilla presents for D1C7 Oxaliplatin, Leucovorin, Fluorouracil push and pump hook up  Met with Dr. Dee before infusion.    Note:     TORB- Dr. Dee/Lily Guardado RN  --oxaliplatin will be dose reduced today, okay to go ahead with platelets of 74    Treatment Conditions:  Lab Results   Component Value Date    HGB 12.5 08/07/2017     Lab Results   Component Value Date    WBC 3.2 08/07/2017      Lab Results   Component Value Date    ANEU 1.8 08/07/2017     Lab Results   Component Value Date    PLT 74 08/07/2017      Lab Results   Component Value Date     08/07/2017                   Lab Results   Component Value Date    POTASSIUM 3.7 08/07/2017           No results found for: MAG         Lab Results   Component Value Date    CR 0.82 08/07/2017                   Lab Results   Component Value Date    NIDHI 9.2 08/07/2017                Lab Results   Component Value Date    BILITOTAL 0.6 08/07/2017           Lab Results   Component Value Date    ALBUMIN 3.2 08/07/2017                    Lab Results   Component Value Date    ALT 27 08/07/2017           Lab Results   Component Value Date    AST 27 08/07/2017     Results reviewed, labs did NOT meet treatment parameters: see TORB above.    Intravenous Access:  Implanted Port.    Post Infusion Assessment:  Patient tolerated infusion without incident.  Blood return noted pre and post infusion.  No evidence of extravasations.    Prior to discharge: Port is secured in place with tegaderm and flushed with 10cc NS with positive blood return noted.  Continuous home infusion Dosi-Fuser pump connected.    All connectors secured in place and clamps taped open.    Pump started, \"running\" noted on display (CADD): Not Applicable.  Patient instructed to call our clinic or [facility] with any questions or concerns at home.  Patient verbalized understanding.    Patient set up for pump disconnect at home with [facility] on " Wednesday, 8/9 @11am- [facility] aware. Pump connections checked with FRANCHESKA Pickett.    Discharge Plan:   Prescription refills given for ambien.  Discharge instructions reviewed with: Patient and Family.  Patient and/or family verbalized understanding of discharge instructions and all questions answered.  Copy of AVS reviewed with patient and/or family.  Patient will return 8/22 for next appointment.  Patient discharged in stable condition accompanied by: self and sister.    Lily Guardado RN

## 2017-08-07 NOTE — PROGRESS NOTES
Oncology Visit:  Date on this visit: August 7, 2017       HISTORY OF PRESENT ILLNESS:  The patient reports that initially he noticed symptoms of dysphagia, mainly to solid food, starting in February of 2017.  Symptoms got progressively worse.  Currently, he is unable to eat solid food, and he needs to take a lot of fluid to push food down.  His dysphagia symptoms prompted subsequent evaluation with upper endoscopy and colonoscopy that was done in [place]. The colonoscopy revealed two tubular adenomas in the colon.  The upper endoscopy revealed an ulcerated mass 1-2 cm long, approximately 40 cm from the lips.  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  The patient also had a PET/CT scan done that revealed widespread lavon metastasis and multiple liver mets that were consistent with metastatic disease. Her 2 negative.      He returns today after six cycles of FOLFOX chemotherapy.  He is feeling much better.  He is eating much better and is no longer having dysphagia.  He has had some difficulty with nausea and constipation during his chemotherapy.  With the addition of Emend, he is doing much better.    He has grade 1 neuropathy.  He continues to work FT in [place].  He reports fatigue days 4-5 and wants to rest most of these days.  Overall, he thinks his cold sensitivity is worse with cold sensitivity lingering for most days of the cycle.      ROS: 10 point ROS neg other than the symptoms noted above in the HPI.       His past medical history is significant only for isolated intermittent atrial fibrillation for which he is intermittently taking a baby aspirin.  He denies any other significant past medical history     SOCIAL HISTORY:  He has a remote history of smoking, about 20-pack years; he quit 10 years ago.  He denies alcohol or illicit drugs.  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  He has a sister who lives in the city, and he  has a 22-year-old son.      REVIEW OF SYSTEMS:  A complete 12 point review of systems is negative, except as documented above.            Past Medical/Surgical History:  Past Medical History:   Diagnosis Date     Atrial fibrillation (H)      Cancer (H)     esophageal     Glaucoma      Glaucoma      Past Surgical History:   Procedure Laterality Date     AMPUTATION      toe     ARTHROSCOPY KNEE       bunion surgery       CHOLECYSTECTOMY       INSERT PORT VASCULAR ACCESS Right 5/9/2017    Procedure: INSERT PORT VASCULAR ACCESS;  Single Lumen Chest Power Port;  Surgeon: Jasiel Hu PA-C;  [facility]   Allergies:  Allergies as of 08/07/2017 - David as Reviewed 08/07/2017   Allergen Reaction Noted     Penicillin g Other (See Comments) 01/19/2017     Penicillins Unknown 04/27/2017     Current Medications:  Current Outpatient Prescriptions   Medication Sig Dispense Refill     zolpidem (AMBIEN CR) 12.5 MG CR tablet Take 1 tablet (12.5 mg) by mouth nightly as needed for sleep 30 tablet 1     Rivaroxaban (XARELTO STARTER PACK) 15 & 20 MG TBPK Take 15 mg by mouth 2 times daily For 21 days and then 20mg daily 51 each 0     ondansetron (ZOFRAN) 8 MG tablet Take 1 tablet (8 mg) by mouth every 8 hours as needed (Nausea/Vomiting) 30 tablet 2     lidocaine-prilocaine (EMLA) cream Apply topically as needed for moderate pain 30 g 3     prochlorperazine (COMPAZINE) 10 MG tablet Take 1 tablet (10 mg) by mouth every 6 hours as needed (Nausea/Vomiting) 30 tablet 2     LORazepam (ATIVAN) 0.5 MG tablet Take 1 tablet (0.5 mg) by mouth every 6 hours as needed for anxiety 60 tablet 1     multivitamin, therapeutic with minerals (MULTI-VITAMIN) TABS tablet Take 1 tablet by mouth daily       fish oil-omega-3 fatty acids 1000 MG capsule Take 2 g by mouth daily       IBUPROFEN PO Take 800 mg by mouth every 8 hours as needed for moderate pain       latanoprost (XALATAN) 0.005 % ophthalmic solution Place into both eyes At Bedtime        timolol (TIMOPTIC) 0.5 % ophthalmic solution Place into both eyes daily        Family History:    Maternal grandmother had breast cancer diagnosed at her 50's.Paternal aunt had some gynecologic cancer    Social History:  Social History     Social History     Marital status: Single     Spouse name: N/A     Number of children: N/A     Years of education: N/A     Occupational History     Not on file.     Social History Main Topics     Smoking status: Former Smoker     Smokeless tobacco: Never Used     Alcohol use Yes      Comment: occasional     Drug use: No      Comment: h/o over 30 years ago     Sexual activity: Not on file     Other Topics Concern     Not on file     Social History Narrative     Physical Exam:  /76  Pulse 68  Temp 98.2  F (36.8  C) (Oral)  Wt (!) 163.6 kg (360 lb 11.2 oz)  SpO2 97%  BMI 41.69 kg/m2    GENERAL APPEARANCE: healthy, alert and no distress     HENT: Mouth without ulcers or lesions     NECK: no adenopathy, no asymmetry or masses     LYMPHATICS: No cervical, supraclavicular, axillary or inguinal lymphadenopathy appreciated     RESP: lungs clear to auscultation - no rales, rhonchi or wheezes     CARDIOVASCULAR: regular rates and rhythm, normal S1 S2, no S3 or S4 and no murmur.     ABDOMEN:  soft, nontender, no HSM or masses and bowel sounds normal     MUSCULOSKELETAL: No edema b/l LE.     SKIN: no suspicious lesions or rashes     PSYCHIATRIC: mentation appears normal and affect normal     Lab Results   Component Value Date    WBC 3.2 08/07/2017     Lab Results   Component Value Date    RBC 3.86 08/07/2017     Lab Results   Component Value Date    HGB 12.5 08/07/2017     Lab Results   Component Value Date    HCT 35.7 08/07/2017     No components found for: MCT  Lab Results   Component Value Date    MCV 93 08/07/2017     Lab Results   Component Value Date    MCH 32.4 08/07/2017     Lab Results   Component Value Date    MCHC 35.0 08/07/2017     Lab Results   Component Value Date     RDW 17.5 08/07/2017     Lab Results   Component Value Date    PLT 74 08/07/2017       Recent Labs   Lab Test  08/07/17   0826  07/25/17   0820   NA  140  140   POTASSIUM  3.7  3.7   CHLORIDE  109  108   CO2  24  25   ANIONGAP  8  8   GLC  107*  104*   BUN  10  11   CR  0.82  0.89   NIDHI  9.2  9.1     Liver Function Studies -   Recent Labs   Lab Test  08/07/17   0826   PROTTOTAL  7.2   ALBUMIN  3.2*   BILITOTAL  0.6   ALKPHOS  98   AST  27   ALT  27       ASSESSMENT/PLAN:  1.  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  This corresponds to stage IV cancer.      He is tolerating FOLFOX well and has had a good partial response to his treatment.  Will continue FOLFOX.  Will reduce oxaliplatin to 80% given neuropathy and thrombocytopenia.   Neuropathy will need to be monitored closely.  I discussed with him that I would like him to have restaging in 4 weeks.  Will arrange for this imaging.    2. Nausea -improved on Emend and decadron.    3.  A-Fib.  He seems to have intermittent a-fib. Currently asymptomatic and in sinus rhythm.  He is advised to continue aspirin.     4.  incidental PE identified on imaging in June 2017 -  Pt remains on Xarelto.               Kadi Dee MD

## 2017-08-11 NOTE — PROGRESS NOTES
This is a recent snapshot of the patient's [facility] medical record.  For current drug dose and complete information and questions, call [phone] or In Basket pool, fv home infusion ([id number])  CSN Number:  [id number]

## 2017-08-12 ASSESSMENT — ENCOUNTER SYMPTOMS
ABDOMINAL PAIN: 0
LEG SWELLING: 1
PALPITATIONS: 1
INSOMNIA: 1
TINGLING: 1
BLOATING: 0
DISTURBANCES IN COORDINATION: 1
INCREASED ENERGY: 1
DIZZINESS: 1
PARALYSIS: 0
FEVER: 0
CHILLS: 0
SLEEP DISTURBANCES DUE TO BREATHING: 0
PANIC: 0
WEIGHT LOSS: 1
DECREASED CONCENTRATION: 1
ARTHRALGIAS: 0
BACK PAIN: 1
NECK PAIN: 0
TACHYCARDIA: 0
EYE REDNESS: 1
CLAUDICATION: 0
NIGHT SWEATS: 0
EYE IRRITATION: 1
STIFFNESS: 0
HYPOTENSION: 0
LOSS OF CONSCIOUSNESS: 0
EYE PAIN: 0
JOINT SWELLING: 0
VOMITING: 0
NUMBNESS: 1
RECTAL PAIN: 0
LEG PAIN: 0
MEMORY LOSS: 1
ORTHOPNEA: 0
HALLUCINATIONS: 0
DEPRESSION: 0
FATIGUE: 1
SEIZURES: 0
DIARRHEA: 1
HEARTBURN: 0
RECTAL BLEEDING: 0
LIGHT-HEADEDNESS: 0
JAUNDICE: 0
NERVOUS/ANXIOUS: 0
DECREASED APPETITE: 0
MUSCLE CRAMPS: 1
ALTERED TEMPERATURE REGULATION: 0
WEAKNESS: 1
NAUSEA: 1
MYALGIAS: 1
BOWEL INCONTINENCE: 0
SYNCOPE: 0
POLYDIPSIA: 0
DOUBLE VISION: 0
BLOOD IN STOOL: 0
WEIGHT GAIN: 0
SPEECH CHANGE: 0
CONSTIPATION: 1
EYE WATERING: 0
HYPERTENSION: 0
POLYPHAGIA: 0
HEADACHES: 1
TREMORS: 0
EXERCISE INTOLERANCE: 0
MUSCLE WEAKNESS: 1

## 2017-08-16 NOTE — PROGRESS NOTES
This is a recent snapshot of the patient's [facility] medical record.  For current drug dose and complete information and questions, call [phone] or In Basket pool, fv home infusion ([id number])  CSN Number:  [id number]

## 2017-08-18 ASSESSMENT — ENCOUNTER SYMPTOMS
LOSS OF CONSCIOUSNESS: 0
NUMBNESS: 1
ABDOMINAL PAIN: 0
EYE IRRITATION: 1
WEIGHT GAIN: 0
JAUNDICE: 0
TREMORS: 0
LEG PAIN: 0
MUSCLE CRAMPS: 1
DECREASED APPETITE: 0
SYNCOPE: 0
CONSTIPATION: 1
POLYDIPSIA: 0
EYE PAIN: 0
MEMORY LOSS: 1
NIGHT SWEATS: 0
HYPOTENSION: 0
FATIGUE: 1
ARTHRALGIAS: 0
MYALGIAS: 1
PARALYSIS: 0
TACHYCARDIA: 0
CLAUDICATION: 0
LEG SWELLING: 1
INSOMNIA: 1
HEARTBURN: 0
ALTERED TEMPERATURE REGULATION: 0
CHILLS: 0
HALLUCINATIONS: 0
NERVOUS/ANXIOUS: 0
JOINT SWELLING: 0
BLOOD IN STOOL: 0
INCREASED ENERGY: 1
FEVER: 0
DEPRESSION: 0
PANIC: 0
DISTURBANCES IN COORDINATION: 1
EXERCISE INTOLERANCE: 0
BACK PAIN: 1
VOMITING: 0
PALPITATIONS: 1
DIZZINESS: 1
BOWEL INCONTINENCE: 0
WEIGHT LOSS: 1
SEIZURES: 0
BLOATING: 0
HEADACHES: 1
EYE REDNESS: 1
DOUBLE VISION: 0
DECREASED CONCENTRATION: 1
LIGHT-HEADEDNESS: 0
NECK PAIN: 0
RECTAL BLEEDING: 0
DIARRHEA: 1
MUSCLE WEAKNESS: 1
EYE WATERING: 0
NAUSEA: 1
HYPERTENSION: 0
RECTAL PAIN: 0
SLEEP DISTURBANCES DUE TO BREATHING: 0
POLYPHAGIA: 0
SPEECH CHANGE: 0
STIFFNESS: 0
WEAKNESS: 1
TINGLING: 1
ORTHOPNEA: 0

## 2017-08-21 ENCOUNTER — OFFICE VISIT (OUTPATIENT)
Dept: CARDIOLOGY | Facility: CLINIC | Age: 57
End: 2017-08-21
Attending: INTERNAL MEDICINE
Payer: COMMERCIAL

## 2017-08-21 ENCOUNTER — PRE VISIT (OUTPATIENT)
Dept: CARDIOLOGY | Facility: CLINIC | Age: 57
End: 2017-08-21

## 2017-08-21 VITALS
BODY MASS INDEX: 36.45 KG/M2 | HEIGHT: 78 IN | SYSTOLIC BLOOD PRESSURE: 130 MMHG | DIASTOLIC BLOOD PRESSURE: 86 MMHG | WEIGHT: 315 LBS | OXYGEN SATURATION: 96 % | HEART RATE: 77 BPM

## 2017-08-21 DIAGNOSIS — G47.09 OTHER INSOMNIA: ICD-10-CM

## 2017-08-21 DIAGNOSIS — I26.99 OTHER PULMONARY EMBOLISM WITHOUT ACUTE COR PULMONALE (H): ICD-10-CM

## 2017-08-21 DIAGNOSIS — I48.91 ATRIAL FIBRILLATION (H): Primary | ICD-10-CM

## 2017-08-21 DIAGNOSIS — C15.5 CANCER OF DISTAL THIRD OF ESOPHAGUS (H): ICD-10-CM

## 2017-08-21 DIAGNOSIS — C77.0 METASTASIS TO HEAD AND NECK LYMPH NODE (H): ICD-10-CM

## 2017-08-21 DIAGNOSIS — I48.19 PERSISTENT ATRIAL FIBRILLATION (H): ICD-10-CM

## 2017-08-21 PROCEDURE — 99214 OFFICE O/P EST MOD 30 MIN: CPT

## 2017-08-21 PROCEDURE — 99204 OFFICE O/P NEW MOD 45 MIN: CPT | Mod: GC | Performed by: INTERNAL MEDICINE

## 2017-08-21 PROCEDURE — 99213 OFFICE O/P EST LOW 20 MIN: CPT | Mod: ZF

## 2017-08-21 ASSESSMENT — PAIN SCALES - GENERAL: PAINLEVEL: NO PAIN (0)

## 2017-08-21 NOTE — PROGRESS NOTES
Cardiac Electrophysiology Clinic    Chief Complaint:  Atrial fibrillation    HPI:  Mr. Padilla is a 57 yo male with atrial fibrillation which he says was diagnosed incidentally one year ago who is referred to us for management of AF by Dr. Kadi Dee, who he sees for stage IV esophageal adenocarcinoma.  He tells me that his prognosis is in the range of a few years.  He has never had palpitations or the sensation of his heart racing.  He does not have chest pain or dyspnea or syncope.  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  He does not have a history of hypertension, diabetes, stroke, or CAD.  An echocardiogram in 5/2017 showed an LV EF of 60-65% with normal RV function and no significant valvular dysfunction.  He was on ASA until his last visit with Dr. Dee three weeks ago when she started him on rivaroxaban for a segmental pulmonary embolism in the right lower lobe.    He is strongly considering getting a tattoo on his right upper or lower arm, probably of some Norse god.  He comes down every other week for chemotherapy.      Current Outpatient Prescriptions on File Prior to Visit:  zolpidem (AMBIEN CR) 6.25 MG CR tablet Take 1 tablet (6.25 mg) by mouth nightly as needed for sleep   zolpidem (AMBIEN CR) 12.5 MG CR tablet Take 1 tablet (12.5 mg) by mouth nightly as needed for sleep   [DISCONTINUED] Rivaroxaban (XARELTO STARTER PACK) 15 & 20 MG TBPK Take 15 mg by mouth 2 times daily For 21 days and then 20mg daily   ondansetron (ZOFRAN) 8 MG tablet Take 1 tablet (8 mg) by mouth every 8 hours as needed (Nausea/Vomiting)   lidocaine-prilocaine (EMLA) cream Apply topically as needed for moderate pain   prochlorperazine (COMPAZINE) 10 MG tablet Take 1 tablet (10 mg) by mouth every 6 hours as needed (Nausea/Vomiting)   LORazepam (ATIVAN) 0.5 MG tablet Take 1 tablet (0.5  "mg) by mouth every 6 hours as needed for anxiety   multivitamin, therapeutic with minerals (MULTI-VITAMIN) TABS tablet Take 1 tablet by mouth daily   fish oil-omega-3 fatty acids 1000 MG capsule Take 2 g by mouth daily   IBUPROFEN PO Take 800 mg by mouth every 8 hours as needed for moderate pain   latanoprost (XALATAN) 0.005 % ophthalmic solution Place into both eyes At Bedtime   timolol (TIMOPTIC) 0.5 % ophthalmic solution Place into both eyes daily     No current facility-administered medications on file prior to visit.     Past Medical History:   Diagnosis Date     Atrial fibrillation (H)      Cancer (H)     esophageal     Glaucoma      Glaucoma        Past Surgical History:   Procedure Laterality Date     AMPUTATION      toe     ARTHROSCOPY KNEE       bunion surgery       CHOLECYSTECTOMY       INSERT PORT VASCULAR ACCESS Right 5/9/2017    Procedure: INSERT PORT VASCULAR ACCESS;  Single Lumen Chest Power Port;  Surgeon: Jasiel Hu PA-C;  Location:  OR       No family history on file.    Social History   Substance Use Topics     Smoking status: Former Smoker     Smokeless tobacco: Never Used     Alcohol use Yes      Comment: occasional       Allergies   Allergen Reactions     Penicillin G Other (See Comments)     Pt not sure-     Penicillins Unknown         A complete review of systems was negative except as noted above in the HPI.    Physical Examination:  Vitals: /86 (BP Location: Left arm, Patient Position: Chair, Cuff Size: Adult Large)  Pulse 77  Ht 1.981 m (6' 6\")  Wt (!) 166.9 kg (367 lb 14.4 oz)  SpO2 96%  BMI 42.52 kg/m2  GENERAL APPEARANCE:  Comfortable appearing male with unlabored breathing.  HEENT: no icterus, no xanthelasmas.  Conjugate gaze.  NECK:  JVP is not elevated.  CHEST:  lungs clear to auscultation  CARDIOVASCULAR:  regular rhythm, normal S1 and s2 with nor murmur or rub.  Warm extremities with 1+ edema in both lower legs.  ABDOMEN: soft, not tender.  NEURO: " alert and fully oriented, fluent speech, steady gait  SKIN: not jaundiced, no petechiae/ecchymoses    Labs, imaging, and procedures were reviewed.  Recent Labs   Lab Test  08/07/17   0826   05/09/17   0845   CR  0.82   < >   --    HGB  12.5*   < >  14.9   PLT  74*   < >  218   INR   --    --   1.16*    < > = values in this interval not displayed.     EKGs in May and June of this year show AF with ventricular rates in the 60s-80s.        Assessment and recommendations:  Mr. Padilla was referred for management of persistent, asymptomatic atrial fibrillation reportedly diagnosed incidentally by EKG one year ago.  He is being treated for stage IV esophageal adenocarcinoma with chemotherapy.    Stroke/thromboembolism prophylaxis:  CHADSVasc score is 1 (PE) but in the setting of active malignancy he is probably at sufficient risk of thromboembolism to benefit from anticoagulation.  In any case, the PE and active malignancy itself would be sufficient indication for anticoagulation.  He has been tolerating rivaroxaban without observed bleeding issues.  ASA may be discontinued now that he is anticoagulated.  He was instructed to hold rivaroxaban 2-3 days prior to his tattoo.    Rate control:  Adequate even without AV lavon blocking agents, per HRs in clinic and on EKGs.    Rhythm control:  We discussed cardioversion, AAT, and ablation, but there is not a compelling reason to pursue a rhythm control strategy.    He will return to clinic in 3 months, sooner if he develops symptoms attributable to AF and later if he is feeling well and wants to push back the appointment.    I appreciate the chance to help with Mr. Padilla's care. Please let me know if you have any questions or concerns.    I discussed the patient with Dr. Romero Guerrero.    Kale Clarke MD  Cardiovascular disease fellow    [facility]  Diagnostic and Treamtent-3rd Floor  [address] [place]  [address]     Name: SKYLER GALLAGHER  MRN: [id number]  : 1997  Study Date: 2017 04:14 PM  Age: 19 yrs  Gender: Female  [place] [facility] [place] Study: Other chest pain, Dyspnea, unspecified  Ordering Physician: BRENDA PRATER  Referring Physician: BRENDA PRATER  Performed By: Colt Rojas DIANA     BSA: 2.3 m2  Height: 70 in  Weight: 239 lb  HR: 74  BP: 124/76 mmHg  _____________________________________________________________________________  __        Procedure  Echocardiogram with two-dimensional, color and spectral Doppler performed.  Limited Echocardiogram with portions of two-dimensional, color and spectral  Doppler performed.  _____________________________________________________________________________  __        Interpretation Summary  Global and regional left ventricular function is normal with an EF of 60-65%.  Global right ventricular function is normal.  No significant valvular abnormalities were noted.  The inferior vena cava was normal in size with preserved respiratory  variability.  No pericardial effusion is present.

## 2017-08-21 NOTE — MR AVS SNAPSHOT
After Visit Summary   8/21/2017    Reuben Padilla    MRN: [id number]           Patient Information     Date Of Birth          1960        Visit Information        Provider Department      8/21/2017 4:00 PM Romero Guerrero MD [facility]        Today's Diagnoses     Atrial fibrillation (H)           Follow-ups after your visit        Your next 10 appointments already scheduled     Aug 22, 2017  8:00 AM CDT   [facility] Lab Draw with  [facility] LAB DRAW   [facility]    [address] [place] [address]   [phone]            Aug 22, 2017  8:30 AM CDT   (Arrive by 8:15 AM)   Return Visit with Katalina Ramirez PA-C   [facility] ([facility])    [address] [place] [address]   [phone]            Aug 22, 2017  9:30 AM CDT   Infusion 240 with  ONCOLOGY INFUSION, [facility] 16 ATC   [facility] ([facility])    [address] [place] [address]   [phone]            Sep 05, 2017  8:15 AM CDT   [facility] Lab Draw with [facility] LAB DRAW   [facility]    [address] [place] [address]   [phone]            Sep 05, 2017  9:00 AM CDT   (Arrive by 8:45 AM)   CT CHEST/ABDOMEN/PELVIS W CONTRAST with UCCT2   [facility] [place] [facility] ([facility])    [address] [place] [id number]   [phone]           Please bring any scans or X-rays taken at other hospitals, if similar tests were done. Also bring a list of your medicines, including vitamins, minerals and over-the-counter drugs. It is safest to leave personal items at home.  Be sure to tell your doctor:   If you have any allergies.   If there s any chance you are pregnant.   If you  are breastfeeding.   If you have any special needs.  You may have contrast for this exam. To prepare:   Do not eat or drink for 2 hours before your exam. If you need to take medicine, you may take it with small sips of water. (We may ask you to take liquid medicine as well.)   The day before your exam, drink extra fluids at least six 8-ounce glasses (unless your doctor tells you to restrict your fluids).  Patients over 70 or patients with diabetes or kidney problems:   If you haven t had a blood test (creatinine test) within the last 30 days, go to your clinic or Diagnostic Imaging Department for this test.  If you have diabetes:   If your kidney function is normal, continue taking your metformin (Avandamet, Glucophage, Glucovance, Metaglip) on the day of your exam.   If your kidney function is abnormal, wait 48 hours before restarting this medicine.  You will have oral contrast for this exam:   You will drink the contrast at home. Get this from your clinic or Diagnostic Imaging Department. Please follow the directions given.  Please wear loose clothing, such as a sweat suit or jogging clothes. Avoid snaps, zippers and other metal. We may ask you to undress and put on a hospital gown.  If you have any questions, please call the Imaging Department where you will have your exam.            Sep 05, 2017 11:30 AM CDT   (Arrive by 11:15 AM)   Return Visit with Kadi Dee MD   [facility] ([facility])    [address] [place] [address]  2nd [facility] [address]   [phone]            Sep 05, 2017  1:00 PM CDT   Infusion 240 with [facility] ONCOLOGY INFUSION, UC 10 ATC   [facility] ([facility])    [address] [place] [address]  2nd [facility] [address]   [phone]            Nov 13, 2017  3:20 PM CST   (Arrive by 3:05 PM)   RETURN ATRIAL FIBULATION VISIT with Romero Guerrero MD   [facility] ([facility]  "Surgery Center)    [address] [facility]  3rd [facility] [id number] [phone]              Who to contact     If you have questions or need follow up information about today's clinic visit or your schedule please contact [facility] directly at [phone].  Normal or non-critical lab and imaging results will be communicated to you by MyChart, letter or phone within 4 business days after the clinic has received the results. If you do not hear from us within 7 days, please contact the clinic through [employer] or phone. If you have a critical or abnormal lab result, we will notify you by phone as soon as possible.  Submit refill requests through [employer] or call your pharmacy and they will forward the refill request to us. Please allow 3 business days for your refill to be completed.          Additional Information About Your Visit        [employer] Information     [employer] gives you secure access to your electronic health record. If you see a primary care provider, you can also send messages to your care team and make appointments. If you have questions, please call your primary care clinic.  If you do not have a primary care provider, please call [phone] and they will assist you.        Care EveryWhere ID     This is your Care EveryWhere ID. This could be used by other organizations to access your [facility] medical records  [id number]        Your Vitals Were     Pulse Height Pulse Oximetry BMI (Body Mass Index)          77 1.981 m (6' 6\") 96% 42.52 kg/m2         Blood Pressure from Last 3 Encounters:   08/21/17 130/86   08/07/17 128/76   07/27/17 142/81    Weight from Last 3 Encounters:   08/21/17 (!) 166.9 kg (367 lb 14.4 oz)   08/07/17 (!) 163.6 kg (360 lb 11.2 oz)   07/27/17 (!) 164.5 kg (362 lb 9.6 oz)              We Performed the Following     EKG 12-lead, tracing only (Future)        Primary Care Provider    Unknown Primary MD Sydney       No address on file        Equal Access to Services "     OLLIE SHARPE : Hadii aad ku harleen Dunlap, waaxda luqadaha, qaybta kaalmada ashleigh, geraldo vernon valorieotilio paintingpalthao [place] . So [facility] [phone].    ATENCIÓN: Si alayna rodriguez, tiene a alamo disposición servicios gratuitos de asistencia lingüística. Llame al [phone].    We comply with applicable federal civil rights laws and [place] laws. We do not discriminate on the basis of race, color, national origin, age, disability sex, sexual orientation or gender identity.            Thank you!     Thank you for choosing [facility]  for your care. Our goal is always to provide you with excellent care. Hearing back from our patients is one way we can continue to improve our services. Please take a few minutes to complete the written survey that you may receive in the mail after your visit with us. Thank you!             Your Updated Medication List - Protect others around you: Learn how to safely use, store and throw away your medicines at www.disposemymeds.org.          This list is accurate as of: 8/21/17  4:32 PM.  Always use your most recent med list.                   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)       LORazepam 0.5 MG tablet    ATIVAN    60 tablet    Take 1 tablet (0.5 mg) by mouth every 6 hours as needed for anxiety    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), 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       Multi-vitamin Tabs tablet      Take 1 tablet by mouth daily        ondansetron 8 MG tablet    ZOFRAN    30 tablet     Take 1 tablet (8 mg) by mouth every 8 hours as needed (Nausea/Vomiting)    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)       prochlorperazine 10 MG tablet    COMPAZINE    30 tablet    Take 1 tablet (10 mg) by mouth every 6 hours as needed (Nausea/Vomiting)    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)       Rivaroxaban 15 & 20 MG Tbpk    XARELTO STARTER PACK    51 each    Take 15 mg by mouth 2 times daily For 21 days and then 20mg daily    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), 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       timolol 0.5 % ophthalmic solution    TIMOPTIC     Place into both eyes daily    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 Read the directions carefully, and ask your doctor or other care provider to review them with you.

## 2017-08-21 NOTE — NURSING NOTE
Provided reassurance regarding his current complicated health conditions.  Discussed the need and importance of anticoagulation.  Patient scheduled for follow up with Dr. Guerrero.

## 2017-08-21 NOTE — LETTER
8/21/2017      RE: Reuben Padilla  PO   [facility] [address]       Dear Colleague,    Thank you for the opportunity to participate in the care of your patient, Reuben Padilla, at the [facility] HE[facility] at [facility]. Please see a copy of my visit note below.    Cardiac Electrophysiology Clinic    Chief Complaint:  Atrial fibrillation    HPI:  Mr. Padilla is a 55 yo male with atrial fibrillation which he says was diagnosed incidentally one year ago who is referred to us for management of AF by Dr. Kadi Dee, who he sees for stage IV esophageal adenocarcinoma.  He tells me that his prognosis is in the range of a few years.  He has never had palpitations or the sensation of his heart racing.  He does not have chest pain or dyspnea or syncope.  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  He does not have a history of hypertension, diabetes, stroke, or CAD.  An echocardiogram in 5/2017 showed an LV EF of 60-65% with normal RV function and no significant valvular dysfunction.  He was on ASA until his last visit with Dr. Dee three weeks ago when she started him on rivaroxaban for a segmental pulmonary embolism in the right lower lobe.    He is strongly considering getting a tattoo on his right upper or lower arm, probably of some Norse god.  He comes down every other week for chemotherapy.      Current Outpatient Prescriptions on File Prior to Visit:  zolpidem (AMBIEN CR) 6.25 MG CR tablet Take 1 tablet (6.25 mg) by mouth nightly as needed for sleep   zolpidem (AMBIEN CR) 12.5 MG CR tablet Take 1 tablet (12.5 mg) by mouth nightly as needed for sleep   [DISCONTINUED] Rivaroxaban (XARELTO STARTER PACK) 15 & 20 MG TBPK Take 15 mg by mouth 2 times daily For 21 days and then 20mg daily   ondansetron (ZOFRAN) 8 MG tablet Take 1 tablet (8  "mg) by mouth every 8 hours as needed (Nausea/Vomiting)   lidocaine-prilocaine (EMLA) cream Apply topically as needed for moderate pain   prochlorperazine (COMPAZINE) 10 MG tablet Take 1 tablet (10 mg) by mouth every 6 hours as needed (Nausea/Vomiting)   LORazepam (ATIVAN) 0.5 MG tablet Take 1 tablet (0.5 mg) by mouth every 6 hours as needed for anxiety   multivitamin, therapeutic with minerals (MULTI-VITAMIN) TABS tablet Take 1 tablet by mouth daily   fish oil-omega-3 fatty acids 1000 MG capsule Take 2 g by mouth daily   IBUPROFEN PO Take 800 mg by mouth every 8 hours as needed for moderate pain   latanoprost (XALATAN) 0.005 % ophthalmic solution Place into both eyes At Bedtime   timolol (TIMOPTIC) 0.5 % ophthalmic solution Place into both eyes daily     No current facility-administered medications on file prior to visit.     Past Medical History:   Diagnosis Date     Atrial fibrillation (H)      Cancer (H)     esophageal     Glaucoma      Glaucoma        Past Surgical History:   Procedure Laterality Date     AMPUTATION      toe     ARTHROSCOPY KNEE       bunion surgery       CHOLECYSTECTOMY       INSERT PORT VASCULAR ACCESS Right 5/9/2017    Procedure: INSERT PORT VASCULAR ACCESS;  Single Lumen Chest Power Port;  Surgeon: Jasiel Hu PA-C;  Location:  OR       No family history on file.    Social History   Substance Use Topics     Smoking status: Former Smoker     Smokeless tobacco: Never Used     Alcohol use Yes      Comment: occasional       Allergies   Allergen Reactions     Penicillin G Other (See Comments)     Pt not sure-     Penicillins Unknown         A complete review of systems was negative except as noted above in the HPI.    Physical Examination:  Vitals: /86 (BP Location: Left arm, Patient Position: Chair, Cuff Size: Adult Large)  Pulse 77  Ht 1.981 m (6' 6\")  Wt (!) 166.9 kg (367 lb 14.4 oz)  SpO2 96%  BMI 42.52 kg/m2  GENERAL APPEARANCE:  Comfortable appearing male with " unlabored breathing.  HEENT: no icterus, no xanthelasmas.  Conjugate gaze.  NECK:  JVP is not elevated.  CHEST:  lungs clear to auscultation  CARDIOVASCULAR:  regular rhythm, normal S1 and s2 with nor murmur or rub.  Warm extremities with 1+ edema in both lower legs.  ABDOMEN: soft, not tender.  NEURO: alert and fully oriented, fluent speech, steady gait  SKIN: not jaundiced, no petechiae/ecchymoses    Labs, imaging, and procedures were reviewed.  Recent Labs   Lab Test  08/07/17   0826   05/09/17   0845   CR  0.82   < >   --    HGB  12.5*   < >  14.9   PLT  74*   < >  218   INR   --    --   1.16*    < > = values in this interval not displayed.     EKGs in May and June of this year show AF with ventricular rates in the 60s-80s.        Assessment and recommendations:  Mr. Padilla was referred for management of persistent, asymptomatic atrial fibrillation reportedly diagnosed incidentally by EKG one year ago.  He is being treated for stage IV esophageal adenocarcinoma with chemotherapy.    Stroke/thromboembolism prophylaxis:  CHADSVasc score is 1 (PE) but in the setting of active malignancy he is probably at sufficient risk of thromboembolism to benefit from anticoagulation.  In any case, the PE and active malignancy itself would be sufficient indication for anticoagulation.  He has been tolerating rivaroxaban without observed bleeding issues.  ASA may be discontinued now that he is anticoagulated.  He was instructed to hold rivaroxaban 2-3 days prior to his tattoo.    Rate control:  Adequate even without AV lavon blocking agents, per HRs in clinic and on EKGs.    Rhythm control:  We discussed cardioversion, AAT, and ablation, but there is not a compelling reason to pursue a rhythm control strategy.    He will return to clinic in 3 months, sooner if he develops symptoms attributable to AF and later if he is feeling well and wants to push back the appointment.    I appreciate the chance to help with Mr. Padilla's care.  Please let me know if you have any questions or concerns.    I discussed the patient with Dr. Romero Guerrero.    Kale Clarke MD  Cardiovascular disease fellow    [facility]  [facility]  [address] [place] [address]     Name: SKYLER GALLAGHER  MRN: [id number]  : 1997  Study Date: 2017 04:14 PM  Age: 19 yrs  Gender: Female  [place] [facility] [place]  Reason For Study: Other chest pain, Dyspnea, unspecified  Ordering Physician: ROMERO GUERRERO  Referring Physician: ROMERO GUERRERO  Performed By: Colt Rojas RDCS     BSA: 2.3 m2  Height: 70 in  Weight: 239 lb  HR: 74  BP: 124/76 mmHg  _____________________________________________________________________________  __        Procedure  Echocardiogram with two-dimensional, color and spectral Doppler performed.  Limited Echocardiogram with portions of two-dimensional, color and spectral  Doppler performed.  _____________________________________________________________________________  __        Interpretation Summary  Global and regional left ventricular function is normal with an EF of 60-65%.  Global right ventricular function is normal.  No significant valvular abnormalities were noted.  The inferior vena cava was normal in size with preserved respiratory  variability.  No pericardial effusion is present.      Romero Guerrero MD

## 2017-08-21 NOTE — PATIENT INSTRUCTIONS
Please do not hesitate to call if you have any cardiology related questions or concerns, or need to schedule an appointment, at [phone].         Cardiology Medication Refill Request Information:  * Please contact your pharmacy regarding ANY request for medication refills.  ** [facility] Prescription Fax = [phone]  * Please allow 3 business days for routine medication refills.    Cardiology Test Result notification information:  *You will be notified with in 7-10 days of your appointment day regarding the results of your test. If you are on MyChart you will be notified as soon as the provider has reviewed the results and signed off on them. Please call RN Care Coordinator with questions regarding results.

## 2017-08-22 ENCOUNTER — APPOINTMENT (OUTPATIENT)
Dept: LAB | Facility: CLINIC | Age: 57
End: 2017-08-22
Attending: PHYSICIAN ASSISTANT
Payer: COMMERCIAL

## 2017-08-22 ENCOUNTER — ONCOLOGY VISIT (OUTPATIENT)
Dept: ONCOLOGY | Facility: CLINIC | Age: 57
End: 2017-08-22
Attending: PHYSICIAN ASSISTANT
Payer: COMMERCIAL

## 2017-08-22 VITALS
HEART RATE: 71 BPM | RESPIRATION RATE: 16 BRPM | DIASTOLIC BLOOD PRESSURE: 77 MMHG | OXYGEN SATURATION: 95 % | SYSTOLIC BLOOD PRESSURE: 122 MMHG | TEMPERATURE: 98.5 F | BODY MASS INDEX: 36.45 KG/M2 | HEIGHT: 78 IN | WEIGHT: 315 LBS

## 2017-08-22 DIAGNOSIS — G62.9 NEUROPATHY: ICD-10-CM

## 2017-08-22 DIAGNOSIS — D69.6 THROMBOCYTOPENIA (H): ICD-10-CM

## 2017-08-22 DIAGNOSIS — T45.1X5A CHEMOTHERAPY-INDUCED NEUTROPENIA (H): ICD-10-CM

## 2017-08-22 DIAGNOSIS — C15.5 CANCER OF DISTAL THIRD OF ESOPHAGUS (H): Primary | ICD-10-CM

## 2017-08-22 DIAGNOSIS — D70.1 CHEMOTHERAPY-INDUCED NEUTROPENIA (H): ICD-10-CM

## 2017-08-22 PROCEDURE — 36591 DRAW BLOOD OFF VENOUS DEVICE: CPT

## 2017-08-22 PROCEDURE — 99213 OFFICE O/P EST LOW 20 MIN: CPT | Mod: ZF

## 2017-08-22 PROCEDURE — 99215 OFFICE O/P EST HI 40 MIN: CPT | Mod: ZP | Performed by: PHYSICIAN ASSISTANT

## 2017-08-22 PROCEDURE — 25000128 H RX IP 250 OP 636: Mod: ZF | Performed by: PHYSICIAN ASSISTANT

## 2017-08-22 RX ORDER — HEPARIN SODIUM (PORCINE) LOCK FLUSH IV SOLN 100 UNIT/ML 100 UNIT/ML
5 SOLUTION INTRAVENOUS EVERY 8 HOURS
Status: DISCONTINUED | OUTPATIENT
Start: 2017-08-22 | End: 2017-08-22 | Stop reason: HOSPADM

## 2017-08-22 RX ADMIN — SODIUM CHLORIDE, PRESERVATIVE FREE 5 ML: 5 INJECTION INTRAVENOUS at 08:25

## 2017-08-22 ASSESSMENT — PAIN SCALES - GENERAL: PAINLEVEL: NO PAIN (0)

## 2017-08-22 NOTE — NURSING NOTE
Port de-accessed without complication.    See flow-sheets,     Piedad Smith CMA ([facility])

## 2017-08-22 NOTE — NURSING NOTE
"Oncology Rooming Note    August 22, 2017 8:37 AM   Reuben Padilla is a 56 year old male who presents for:    Chief Complaint   Patient presents with     Port Draw     Port labs collected.     Oncology Clinic Visit     Return visit related to Esophageal Cancer     Initial Vitals: /77  Pulse 71  Temp 98.5  F (36.9  C) (Oral)  Resp 16  Ht 1.981 m (6' 5.99\")  Wt (!) 168 kg (370 lb 4.8 oz)  SpO2 95%  BMI 42.8 kg/m2 Estimated body mass index is 42.8 kg/(m^2) as calculated from the following:    Height as of this encounter: 1.981 m (6' 5.99\").    Weight as of this encounter: 168 kg (370 lb 4.8 oz). Body surface area is 3.04 meters squared.  No Pain (0) Comment: Data Unavailable   No LMP for male patient.  Allergies reviewed: Yes  Medications reviewed: Yes    Medications: MEDICATION REFILLS NEEDED TODAY. Provider was notified.  Pharmacy name entered into [facility]:    [id number] - [place] - [address] [place] [address] AT INTERSECTION [address] [place], [place] - [address] [place] [address] [phone]    Clinical concerns: Refill needed for rivaroxaban ANTICOAGULANT (XARELTO) 20 MG tablet Provider was notified.    10 minutes for nursing intake (face to face time)     Kadi Arevalo LPN            "

## 2017-08-22 NOTE — LETTER
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 His tumor is positive for cytokeratin 20, negative for P63 and CK7, and HER2 is negative. He started on FOLFOX (5FU/oxaliplatin) on 5/15/2017.     INTERVAL HISTORY:   Reuben is here with his sister today. He feels more fatigued with this cycle. He continues to work. He only really feels energy arount the time he gets the steroids. He hasn't had any new symptoms since his last visit although notes his vision has gradually gotten more blurry since starting chemotherapy. No new headaches, double vision, or other visual concerns. Eating and drinking is going OK, taste seems to be better. Cold sensitivity is getting worse in his fingertips, without any lingering neuropathy in his hands or worsening neuropathy in his feet. No fevers, chills, cough, SOB, chest pain, vomiting, abdominal pain, bowel changes, swelling, palpitations, or dizziness. Remainder of 10-pt ROS otherwise is negative.    Current Outpatient Prescriptions   Medication Sig Dispense Refill     rivaroxaban ANTICOAGULANT (XARELTO) 20 MG TABS tablet Take 1 tablet (20 mg) by mouth daily (with dinner) 90 tablet 3     zolpidem (AMBIEN CR) 6.25 MG CR tablet Take 1 tablet (6.25 mg) by mouth nightly as needed for sleep 60 tablet 0     zolpidem (AMBIEN CR) 12.5 MG CR tablet Take 1 tablet (12.5 mg) by mouth nightly as needed for sleep 30 tablet 1     ondansetron (ZOFRAN) 8 MG tablet Take 1 tablet (8 mg) by mouth every 8 hours as needed (Nausea/Vomiting) 30 tablet 2     lidocaine-prilocaine (EMLA) cream Apply topically as needed for moderate pain 30 g 3     prochlorperazine (COMPAZINE) 10 MG tablet Take 1 tablet (10 mg) by mouth every 6 hours as needed  (Nausea/Vomiting) 30 tablet 2     LORazepam (ATIVAN) 0.5 MG tablet Take 1 tablet (0.5 mg) by mouth every 6 hours as needed for anxiety 60 tablet 1     multivitamin, therapeutic with minerals (MULTI-VITAMIN) TABS tablet Take 1 tablet by mouth daily       fish oil-omega-3 fatty acids 1000 MG capsule Take 2 g by mouth daily       IBUPROFEN PO Take 800 mg by mouth every 8 hours as needed for moderate pain       latanoprost (XALATAN) 0.005 % ophthalmic solution Place into both eyes At Bedtime       timolol (TIMOPTIC) 0.5 % ophthalmic solution Place into both eyes daily            Allergies   Allergen Reactions     Penicillin G Other (See Comments)     Pt not sure-     Penicillins Unknown       PHYSICAL EXAMINATION  /77  Pulse 71  Temp 98.5  F (36.9  C) (Oral)  Wt (!) 168 kg (370 lb 4.8 oz)  SpO2 95%  BMI 42.79 kg/m2  Wt Readings from Last 4 Encounters:   08/22/17 (!) 168 kg (370 lb 4.8 oz)   08/21/17 (!) 166.9 kg (367 lb 14.4 oz)   08/07/17 (!) 163.6 kg (360 lb 11.2 oz)   07/27/17 (!) 164.5 kg (362 lb 9.6 oz)     Constitutional: Alert, oriented male in no visible distress.  Eyes: PERRL. Anicteric sclerae.  ENT/Mouth: OM moist and pink without lesions or thrush.  CV: sinus rhythm today, RRR  Resp: CTAB throughout  Abdomen: Soft, non-tender, non-distended. Bowel sounds present.Limited by body habitus and seated position  Extremities: No lower extremity edema appreciated.  Skin: Warm, dry. No bruising or petechiae noted. Some mild venous stasis changes on LE bilat.  Lymph: No cervical or supraclavicular lymphadenopathy appreciated.   Neuro: CN II-XII grossly intact.    LABS:    Results for KORI CHANEY (MRN [id number]) as of 8/22/2017 10:51   Ref. Range 8/22/2017 08:25   Sodium Latest Ref Range: 133 - 144 mmol/L 144   Potassium Latest Ref Range: 3.4 - 5.3 mmol/L 3.8   Chloride Latest Ref Range: 94 - 109 mmol/L 113 (H)   Carbon Dioxide Latest Ref Range: 20 - 32 mmol/L 24   Urea Nitrogen Latest Ref Range: 7 -  30 mg/dL 12   Creatinine Latest Ref Range: 0.66 - 1.25 mg/dL 0.72   GFR Estimate Latest Ref Range: >60 mL/min/1.7m2 >90   GFR Estimate If Black Latest Ref Range: >60 mL/min/1.7m2 >90   Calcium Latest Ref Range: 8.5 - 10.1 mg/dL 8.4 (L)   Anion Gap Latest Ref Range: 3 - 14 mmol/L 7   Albumin Latest Ref Range: 3.4 - 5.0 g/dL 2.9 (L)   Protein Total Latest Ref Range: 6.8 - 8.8 g/dL 6.7 (L)   Bilirubin Total Latest Ref Range: 0.2 - 1.3 mg/dL 0.5   Alkaline Phosphatase Latest Ref Range: 40 - 150 U/L 93   ALT Latest Ref Range: 0 - 70 U/L 27   AST Latest Ref Range: 0 - 45 U/L 29   Glucose Latest Ref Range: 70 - 99 mg/dL 123 (H)   WBC Latest Ref Range: 4.0 - 11.0 10e9/L 2.2 (L)   Hemoglobin Latest Ref Range: 13.3 - 17.7 g/dL 11.4 (L)   Hematocrit Latest Ref Range: 40.0 - 53.0 % 33.4 (L)   Platelet Count Latest Ref Range: 150 - 450 10e9/L 65 (L)   RBC Count Latest Ref Range: 4.4 - 5.9 10e12/L 3.43 (L)   MCV Latest Ref Range: 78 - 100 fl 97   MCH Latest Ref Range: 26.5 - 33.0 pg 33.2 (H)   MCHC Latest Ref Range: 31.5 - 36.5 g/dL 34.1   RDW Latest Ref Range: 10.0 - 15.0 % 17.3 (H)   Diff Method Unknown Automated Method   % Neutrophils Latest Units: % 49.1   % Lymphocytes Latest Units: % 38.9   % Monocytes Latest Units: % 10.6   % Eosinophils Latest Units: % 0.9   % Basophils Latest Units: % 0.0   % Immature Granulocytes Latest Units: % 0.5   Nucleated RBCs Latest Ref Range: 0 /100 0   Absolute Neutrophil Latest Ref Range: 1.6 - 8.3 10e9/L 1.1 (L)     IMPRESSION/PLAN:  Reuben Padilla is a 56 year old male with metastatic esophogeal involvement the liver and widespread lavon involvement, currently on FOLFOX.     1. Metastatic esophogeal cancer. Partial response after 4 cycles of FOLFOX. Oxaliplatin was decreased to 80% with cycle 7. He is here today for cycle 8. Cold sensitivity continues to worsen despite dose reduction. He has thrombocytopenia (plt 65) and neutropenia (ANC 1100) today, precluding treatment. I do not feel  comfortable moving forward today given that he is on Xarelto. We discussed that my recommendation would be to reschedule for next week, but Reuben opted to just cancel this cycle and return 9/5 as scheduled. He has plans next week and would like to live his life.  Moving forward, may need dose reduction given the cytopenias and worsening neuropathy.  - Restaging in September after this cycle    2. Nausea. Improved with Emend and Decadron. Counseled use of Compazine on day 3 for nausea.    3. A fib. Intermittent a-fib. Sinus today. He is on Xarelto for PE.    4. Incidental PE: Identified on restaging 7/10/17. He started on Xarelto 15 mg BID x 21 days, followed by 20 mg daily. Will continue to monitor PLT.    5. Insomnia: Currently on Ambien    6. Fatigue: Pervasive, lasting the full 2 week period between chemotherapy. Likely multifactroial in setting of chemo, cancer, and anemia. We discussed Ritalin, but given his history of arrythmia, this may not be a good choice. We discussed potentially using a short course of oral dex on days 3-6 with his next cycle.    I spent >25 minutes with the patient, with over 50% of the time spent counseling or coordinating their care as described above.    Katalina Ramirez PA-C  Hematology, Oncology, and Transplant  [facility]  [address] [place] [address] [phone]

## 2017-08-22 NOTE — PROGRESS NOTES
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 His tumor is positive for cytokeratin 20, negative for P63 and CK7, and HER2 is negative. He started on FOLFOX (5FU/oxaliplatin) on 5/15/2017.     INTERVAL HISTORY:   Reuben is here with his sister today. He feels more fatigued with this cycle. He continues to work. He only really feels energy arount the time he gets the steroids. He hasn't had any new symptoms since his last visit although notes his vision has gradually gotten more blurry since starting chemotherapy. No new headaches, double vision, or other visual concerns. Eating and drinking is going OK, taste seems to be better. Cold sensitivity is getting worse in his fingertips, without any lingering neuropathy in his hands or worsening neuropathy in his feet. No fevers, chills, cough, SOB, chest pain, vomiting, abdominal pain, bowel changes, swelling, palpitations, or dizziness. Remainder of 10-pt ROS otherwise is negative.    Current Outpatient Prescriptions   Medication Sig Dispense Refill     rivaroxaban ANTICOAGULANT (XARELTO) 20 MG TABS tablet Take 1 tablet (20 mg) by mouth daily (with dinner) 90 tablet 3     zolpidem (AMBIEN CR) 6.25 MG CR tablet Take 1 tablet (6.25 mg) by mouth nightly as needed for sleep 60 tablet 0     zolpidem (AMBIEN CR) 12.5 MG CR tablet Take 1 tablet (12.5 mg) by mouth nightly as needed for sleep 30 tablet 1     ondansetron (ZOFRAN) 8 MG tablet Take 1 tablet (8 mg) by mouth every 8 hours as needed (Nausea/Vomiting) 30 tablet 2     lidocaine-prilocaine (EMLA) cream Apply topically as needed for moderate pain 30 g 3     prochlorperazine (COMPAZINE) 10 MG tablet Take 1 tablet (10 mg) by mouth every 6 hours as needed (Nausea/Vomiting) 30 tablet 2     LORazepam (ATIVAN) 0.5 MG tablet Take 1 tablet  (0.5 mg) by mouth every 6 hours as needed for anxiety 60 tablet 1     multivitamin, therapeutic with minerals (MULTI-VITAMIN) TABS tablet Take 1 tablet by mouth daily       fish oil-omega-3 fatty acids 1000 MG capsule Take 2 g by mouth daily       IBUPROFEN PO Take 800 mg by mouth every 8 hours as needed for moderate pain       latanoprost (XALATAN) 0.005 % ophthalmic solution Place into both eyes At Bedtime       timolol (TIMOPTIC) 0.5 % ophthalmic solution Place into both eyes daily            Allergies   Allergen Reactions     Penicillin G Other (See Comments)     Pt not sure-     Penicillins Unknown       PHYSICAL EXAMINATION  /77  Pulse 71  Temp 98.5  F (36.9  C) (Oral)  Wt (!) 168 kg (370 lb 4.8 oz)  SpO2 95%  BMI 42.79 kg/m2  Wt Readings from Last 4 Encounters:   08/22/17 (!) 168 kg (370 lb 4.8 oz)   08/21/17 (!) 166.9 kg (367 lb 14.4 oz)   08/07/17 (!) 163.6 kg (360 lb 11.2 oz)   07/27/17 (!) 164.5 kg (362 lb 9.6 oz)     Constitutional: Alert, oriented male in no visible distress.  Eyes: PERRL. Anicteric sclerae.  ENT/Mouth: OM moist and pink without lesions or thrush.  CV: sinus rhythm today, RRR  Resp: CTAB throughout  Abdomen: Soft, non-tender, non-distended. Bowel sounds present.Limited by body habitus and seated position  Extremities: No lower extremity edema appreciated.  Skin: Warm, dry. No bruising or petechiae noted. Some mild venous stasis changes on LE bilat.  Lymph: No cervical or supraclavicular lymphadenopathy appreciated.   Neuro: CN II-XII grossly intact.    LABS:    Results for KORI CHANEY (MRN [id number]) as of 8/22/2017 10:51   Ref. Range 8/22/2017 08:25   Sodium Latest Ref Range: 133 - 144 mmol/L 144   Potassium Latest Ref Range: 3.4 - 5.3 mmol/L 3.8   Chloride Latest Ref Range: 94 - 109 mmol/L 113 (H)   Carbon Dioxide Latest Ref Range: 20 - 32 mmol/L 24   Urea Nitrogen Latest Ref Range: 7 - 30 mg/dL 12   Creatinine Latest Ref Range: 0.66 - 1.25 mg/dL 0.72   GFR Estimate  Latest Ref Range: >60 mL/min/1.7m2 >90   GFR Estimate If Black Latest Ref Range: >60 mL/min/1.7m2 >90   Calcium Latest Ref Range: 8.5 - 10.1 mg/dL 8.4 (L)   Anion Gap Latest Ref Range: 3 - 14 mmol/L 7   Albumin Latest Ref Range: 3.4 - 5.0 g/dL 2.9 (L)   Protein Total Latest Ref Range: 6.8 - 8.8 g/dL 6.7 (L)   Bilirubin Total Latest Ref Range: 0.2 - 1.3 mg/dL 0.5   Alkaline Phosphatase Latest Ref Range: 40 - 150 U/L 93   ALT Latest Ref Range: 0 - 70 U/L 27   AST Latest Ref Range: 0 - 45 U/L 29   Glucose Latest Ref Range: 70 - 99 mg/dL 123 (H)   WBC Latest Ref Range: 4.0 - 11.0 10e9/L 2.2 (L)   Hemoglobin Latest Ref Range: 13.3 - 17.7 g/dL 11.4 (L)   Hematocrit Latest Ref Range: 40.0 - 53.0 % 33.4 (L)   Platelet Count Latest Ref Range: 150 - 450 10e9/L 65 (L)   RBC Count Latest Ref Range: 4.4 - 5.9 10e12/L 3.43 (L)   MCV Latest Ref Range: 78 - 100 fl 97   MCH Latest Ref Range: 26.5 - 33.0 pg 33.2 (H)   MCHC Latest Ref Range: 31.5 - 36.5 g/dL 34.1   RDW Latest Ref Range: 10.0 - 15.0 % 17.3 (H)   Diff Method Unknown Automated Method   % Neutrophils Latest Units: % 49.1   % Lymphocytes Latest Units: % 38.9   % Monocytes Latest Units: % 10.6   % Eosinophils Latest Units: % 0.9   % Basophils Latest Units: % 0.0   % Immature Granulocytes Latest Units: % 0.5   Nucleated RBCs Latest Ref Range: 0 /100 0   Absolute Neutrophil Latest Ref Range: 1.6 - 8.3 10e9/L 1.1 (L)     IMPRESSION/PLAN:  Reuben Padilla is a 56 year old male with metastatic esophogeal involvement the liver and widespread lavon involvement, currently on FOLFOX.     1. Metastatic esophogeal cancer. Partial response after 4 cycles of FOLFOX. Oxaliplatin was decreased to 80% with cycle 7. He is here today for cycle 8. Cold sensitivity continues to worsen despite dose reduction. He has thrombocytopenia (plt 65) and neutropenia (ANC 1100) today, precluding treatment. I do not feel comfortable moving forward today given that he is on Xarelto. We discussed that my  recommendation would be to reschedule for next week, but Reuben opted to just cancel this cycle and return 9/5 as scheduled. He has plans next week and would like to live his life.  Moving forward, may need dose reduction given the cytopenias and worsening neuropathy.  - Restaging in September after this cycle    2. Nausea. Improved with Emend and Decadron. Counseled use of Compazine on day 3 for nausea.    3. A fib. Intermittent a-fib. Sinus today. He is on Xarelto for PE.    4. Incidental PE: Identified on restaging 7/10/17. He started on Xarelto 15 mg BID x 21 days, followed by 20 mg daily. Will continue to monitor PLT.    5. Insomnia: Currently on Ambien    6. Fatigue: Pervasive, lasting the full 2 week period between chemotherapy. Likely multifactroial in setting of chemo, cancer, and anemia. We discussed Ritalin, but given his history of arrythmia, this may not be a good choice. We discussed potentially using a short course of oral dex on days 3-6 with his next cycle.    I spent >25 minutes with the patient, with over 50% of the time spent counseling or coordinating their care as described above.    Katalina Ramirez PA-C  Hematology, Oncology, and Transplant  [facility]  [address] [place] [id number] [phone]

## 2017-08-22 NOTE — MR AVS SNAPSHOT
After Visit Summary   8/22/2017    Reuben Padilla    MRN: [id number]           Patient Information     Date Of Birth          1960        Visit Information        Provider Department      8/22/2017 8:30 AM Katalina Ramirez PA-C [facility]        Today's Diagnoses     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)    -  1    Neuropathy (H)        Chemotherapy-induced neutropenia (H)        Thrombocytopenia (H)           Follow-ups after your visit        Your next 10 appointments already scheduled     Sep 05, 2017  8:15 AM CDT   [facility] with  [facility]   [facility])    [address] [id number] [phone]            Sep 05, 2017  9:00 AM CDT   (Arrive by 8:45 AM)   CT CHEST/ABDOMEN/PELVIS W CONTRAST with UCCT2   [facility] ([facility])    [address] [id number] [phone]           Please bring any scans or X-rays taken at other hospitals, if similar tests were done. Also bring a list of your medicines, including vitamins, minerals and over-the-counter drugs. It is safest to leave personal items at home.  Be sure to tell your doctor:   If you have any allergies.   If there s any chance you are pregnant.   If you are breastfeeding.   If you have any special needs.  You may have contrast for this exam. To prepare:   Do not eat or drink for 2 hours before your exam. If you need to take medicine, you may take it with small sips of water. (We may ask you to take liquid medicine as well.)   The day before your exam, drink extra fluids at least six 8-ounce glasses (unless your doctor tells you to restrict your fluids).  Patients over 70 or patients with diabetes or kidney problems:   If you haven t had a blood test (creatinine test) within the last 30 days, go to your clinic or Diagnostic Imaging  Department for this test.  If you have diabetes:   If your kidney function is normal, continue taking your metformin (Avandamet, Glucophage, Glucovance, Metaglip) on the day of your exam.   If your kidney function is abnormal, wait 48 hours before restarting this medicine.  You will have oral contrast for this exam:   You will drink the contrast at home. Get this from your clinic or Diagnostic Imaging Department. Please follow the directions given.  Please wear loose clothing, such as a sweat suit or jogging clothes. Avoid snaps, zippers and other metal. We may ask you to undress and put on a hospital gown.  If you have any questions, please call the Imaging Department where you will have your exam.            Sep 05, 2017 11:30 AM CDT   (Arrive by 11:15 AM)   Return Visit with Kadi Dee MD   [facility] ([facility])    [address] [place] [id number] [phone]            Sep 05, 2017  1:00 PM CDT   Infusion 240 with UC ONCOLOGY INFUSION, UC 10 ATC   [facility] ([facility])    [address] [place] [id number] [phone]            Nov 13, 2017  3:20 PM CST   (Arrive by 3:05 PM)   RETURN ATRIAL FIBULATION VISIT with Romero Guerrero MD   [facility] ([facility])    [address] [place] [id number] [phone]              Who to contact     If you have questions or need follow up information about today's clinic visit or your schedule please contact [facility] directly at [phone].  Normal or non-critical lab and imaging results will be communicated to you by MyChart, letter or phone within 4 business days after the clinic has received the results. If you do not hear from us within 7 days, please contact the clinic through MyChart or phone. If you have a critical or  "abnormal lab result, we will notify you by phone as soon as possible.  Submit refill requests through [email or web address] or call your pharmacy and they will forward the refill request to us. Please allow 3 business days for your refill to be completed.          Additional Information About Your Visit        [employer] Information     [email or web address] gives you secure access to your electronic health record. If you see a primary care provider, you can also send messages to your care team and make appointments. If you have questions, please call your primary care clinic.  If you do not have a primary care provider, please call [phone] and they will assist you.        Care EveryWhere ID     This is your Care EveryWhere ID. This could be used by other organizations to access your [facility] medical records  [id number]        Your Vitals Were     Pulse Temperature Respirations Height Pulse Oximetry BMI (Body Mass Index)    71 98.5  F (36.9  C) (Oral) 16 1.981 m (6' 5.99\") 95% 42.8 kg/m2       Blood Pressure from Last 3 Encounters:   08/22/17 122/77   08/21/17 130/86   08/07/17 128/76    Weight from Last 3 Encounters:   08/22/17 (!) 168 kg (370 lb 4.8 oz)   08/21/17 (!) 166.9 kg (367 lb 14.4 oz)   08/07/17 (!) 163.6 kg (360 lb 11.2 oz)              Today, you had the following     No orders found for display       Primary Care Provider    Unknown Primary MD Sydney       No address on file        Equal Access to Services     [facility]: Hadii antonio navarroo Sojose antonioali, waaxda luqadaha, qaybta kaalmada adeegyada, geraldo washington . So [facility] [phone].    ATENCIÓN: Si habla español, tiene a alamo disposición servicios gratuitos de asistencia lingüística. Llame al [phone].    We comply with applicable federal civil rights laws and [place] laws. We do not discriminate on the basis of race, color, national origin, age, disability sex, sexual orientation or gender identity.            Thank you!     Thank you for " [facility]  for your care. Our goal is always to provide you with excellent care. Hearing back from our patients is one way we can continue to improve our services. Please take a few minutes to complete the written survey that you may receive in the mail after your visit with us. Thank you!             Your Updated Medication List - Protect others around you: Learn how to safely use, store and throw away your medicines at www.disposemymeds.org.          This list is accurate as of: 8/22/17 10:54 AM.  Always use your most recent med list.                   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)       LORazepam 0.5 MG tablet    ATIVAN    60 tablet    Take 1 tablet (0.5 mg) by mouth every 6 hours as needed for anxiety    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), 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       Multi-vitamin Tabs tablet      Take 1 tablet by mouth daily        ondansetron 8 MG tablet    ZOFRAN    30 tablet    Take 1 tablet (8 mg) by mouth every 8 hours as needed (Nausea/Vomiting)    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)       prochlorperazine 10 MG tablet    COMPAZINE    30 tablet    Take 1 tablet (10 mg) by mouth every 6 hours as needed (Nausea/Vomiting)    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)       rivaroxaban ANTICOAGULANT 20 MG Tabs tablet    XARELTO    90 tablet    Take 1 tablet (20 mg) by mouth daily (with dinner)    Atrial fibrillation (H), Other pulmonary embolism without acute cor pulmonale (H), Cancer  of distal third of esophagus (H)       timolol 0.5 % ophthalmic solution    TIMOPTIC     Place into both eyes daily    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 Read the directions carefully, and ask your doctor or other care provider to review them with you.

## 2017-08-29 ENCOUNTER — DOCUMENTATION ONLY (OUTPATIENT)
Dept: ONCOLOGY | Facility: CLINIC | Age: 57
End: 2017-08-29

## 2017-08-29 NOTE — PROGRESS NOTES
Form Request Documentation    Date Received in Clinic:  8/21/2017  Name/Type of Form: [employer] Information  Questions that need to be addressed:   Other: None  Date Completed: 8/29/2017  Copy Mailed to patient: Yes on 8/29/2017  Disposition of Form: Fax to Carter Irizarry at [phone] on 8/29/2017

## 2017-09-02 ASSESSMENT — ENCOUNTER SYMPTOMS
CONSTIPATION: 1
TROUBLE SWALLOWING: 0
PALPITATIONS: 0
MUSCLE CRAMPS: 0
CLAUDICATION: 0
WEIGHT LOSS: 0
WEAKNESS: 1
LEG PAIN: 0
NAUSEA: 1
EYE PAIN: 1
MYALGIAS: 1
LEG SWELLING: 1
DISTURBANCES IN COORDINATION: 1
FATIGUE: 1
SMELL DISTURBANCE: 1
SEIZURES: 0
TACHYCARDIA: 0
DIARRHEA: 1
TASTE DISTURBANCE: 1
SINUS PAIN: 1
EXERCISE INTOLERANCE: 1
TINGLING: 0
ABDOMINAL PAIN: 0
HYPERTENSION: 0
ARTHRALGIAS: 1
VOMITING: 0
NERVOUS/ANXIOUS: 0
BLOATING: 0
LIGHT-HEADEDNESS: 1
WEIGHT GAIN: 0
ALTERED TEMPERATURE REGULATION: 1
RECTAL PAIN: 0
EYE REDNESS: 1
PANIC: 0
POLYPHAGIA: 0
MEMORY LOSS: 1
TREMORS: 0
NECK PAIN: 0
POOR WOUND HEALING: 0
INSOMNIA: 1
INCREASED ENERGY: 1
BACK PAIN: 1
DIZZINESS: 1
HEADACHES: 0
HYPOTENSION: 0
DECREASED APPETITE: 0
SYNCOPE: 0
LOSS OF CONSCIOUSNESS: 0
SKIN CHANGES: 0
DECREASED CONCENTRATION: 1
EYE WATERING: 1
SORE THROAT: 0
FEVER: 0
NAIL CHANGES: 0
HEARTBURN: 0
SPEECH CHANGE: 0
HOARSE VOICE: 0
RECTAL BLEEDING: 0
HALLUCINATIONS: 0
SLEEP DISTURBANCES DUE TO BREATHING: 0
DOUBLE VISION: 1
ORTHOPNEA: 0
MUSCLE WEAKNESS: 1
EYE IRRITATION: 1
SINUS CONGESTION: 1
SWOLLEN GLANDS: 0
CHILLS: 0
PARALYSIS: 0
JOINT SWELLING: 0
POLYDIPSIA: 0
BRUISES/BLEEDS EASILY: 1
DEPRESSION: 0
NIGHT SWEATS: 0
NUMBNESS: 1
STIFFNESS: 0
BLOOD IN STOOL: 1
JAUNDICE: 0
BOWEL INCONTINENCE: 0
NECK MASS: 0

## 2017-09-05 ENCOUNTER — ONCOLOGY VISIT (OUTPATIENT)
Dept: ONCOLOGY | Facility: CLINIC | Age: 57
End: 2017-09-05
Attending: INTERNAL MEDICINE
Payer: COMMERCIAL

## 2017-09-05 VITALS
DIASTOLIC BLOOD PRESSURE: 72 MMHG | RESPIRATION RATE: 16 BRPM | HEART RATE: 92 BPM | BODY MASS INDEX: 42.32 KG/M2 | WEIGHT: 315 LBS | OXYGEN SATURATION: 95 % | TEMPERATURE: 98 F | SYSTOLIC BLOOD PRESSURE: 107 MMHG

## 2017-09-05 DIAGNOSIS — C15.5 CANCER OF DISTAL THIRD OF ESOPHAGUS (H): Primary | ICD-10-CM

## 2017-09-05 DIAGNOSIS — C15.5 CANCER OF DISTAL THIRD OF ESOPHAGUS (H): ICD-10-CM

## 2017-09-05 LAB
ALBUMIN SERPL-MCNC: 3.1 G/DL (ref 3.4–5)
ALP SERPL-CCNC: 93 U/L (ref 40–150)
ALT SERPL W P-5'-P-CCNC: 26 U/L (ref 0–70)
ANION GAP SERPL CALCULATED.3IONS-SCNC: 6 MMOL/L (ref 3–14)
AST SERPL W P-5'-P-CCNC: 27 U/L (ref 0–45)
BASOPHILS # BLD AUTO: 0 10E9/L (ref 0–0.2)
BASOPHILS NFR BLD AUTO: 0.5 %
BILIRUB SERPL-MCNC: 0.6 MG/DL (ref 0.2–1.3)
BUN SERPL-MCNC: 12 MG/DL (ref 7–30)
CALCIUM SERPL-MCNC: 8.6 MG/DL (ref 8.5–10.1)
CHLORIDE SERPL-SCNC: 109 MMOL/L (ref 94–109)
CO2 SERPL-SCNC: 26 MMOL/L (ref 20–32)
CREAT SERPL-MCNC: 0.76 MG/DL (ref 0.66–1.25)
DIFFERENTIAL METHOD BLD: ABNORMAL
EOSINOPHIL # BLD AUTO: 0 10E9/L (ref 0–0.7)
EOSINOPHIL NFR BLD AUTO: 0.5 %
ERYTHROCYTE [DISTWIDTH] IN BLOOD BY AUTOMATED COUNT: 13.5 % (ref 10–15)
GFR SERPL CREATININE-BSD FRML MDRD: >90 ML/MIN/1.7M2
GLUCOSE SERPL-MCNC: 102 MG/DL (ref 70–99)
HCT VFR BLD AUTO: 40.6 % (ref 40–53)
HGB BLD-MCNC: 13.9 G/DL (ref 13.3–17.7)
IMM GRANULOCYTES # BLD: 0 10E9/L (ref 0–0.4)
IMM GRANULOCYTES NFR BLD: 0.5 %
LYMPHOCYTES # BLD AUTO: 1.2 10E9/L (ref 0.8–5.3)
LYMPHOCYTES NFR BLD AUTO: 29.3 %
MCH RBC QN AUTO: 32.8 PG (ref 26.5–33)
MCHC RBC AUTO-ENTMCNC: 34.2 G/DL (ref 31.5–36.5)
MCV RBC AUTO: 96 FL (ref 78–100)
MONOCYTES # BLD AUTO: 0.4 10E9/L (ref 0–1.3)
MONOCYTES NFR BLD AUTO: 9.6 %
NEUTROPHILS # BLD AUTO: 2.4 10E9/L (ref 1.6–8.3)
NEUTROPHILS NFR BLD AUTO: 59.6 %
NRBC # BLD AUTO: 0 10*3/UL
NRBC BLD AUTO-RTO: 0 /100
PLATELET # BLD AUTO: 122 10E9/L (ref 150–450)
POTASSIUM SERPL-SCNC: 3.9 MMOL/L (ref 3.4–5.3)
PROT SERPL-MCNC: 7.5 G/DL (ref 6.8–8.8)
RBC # BLD AUTO: 4.24 10E12/L (ref 4.4–5.9)
SODIUM SERPL-SCNC: 140 MMOL/L (ref 133–144)
WBC # BLD AUTO: 4.1 10E9/L (ref 4–11)

## 2017-09-05 PROCEDURE — 96368 THER/DIAG CONCURRENT INF: CPT

## 2017-09-05 PROCEDURE — 96416 CHEMO PROLONG INFUSE W/PUMP: CPT

## 2017-09-05 PROCEDURE — 96367 TX/PROPH/DG ADDL SEQ IV INF: CPT

## 2017-09-05 PROCEDURE — 85025 COMPLETE CBC W/AUTO DIFF WBC: CPT | Performed by: INTERNAL MEDICINE

## 2017-09-05 PROCEDURE — 99214 OFFICE O/P EST MOD 30 MIN: CPT | Mod: ZP | Performed by: INTERNAL MEDICINE

## 2017-09-05 PROCEDURE — 96375 TX/PRO/DX INJ NEW DRUG ADDON: CPT

## 2017-09-05 PROCEDURE — 80053 COMPREHEN METABOLIC PANEL: CPT | Performed by: INTERNAL MEDICINE

## 2017-09-05 PROCEDURE — 96411 CHEMO IV PUSH ADDL DRUG: CPT

## 2017-09-05 PROCEDURE — 96415 CHEMO IV INFUSION ADDL HR: CPT

## 2017-09-05 PROCEDURE — 25000128 H RX IP 250 OP 636: Mod: ZF | Performed by: INTERNAL MEDICINE

## 2017-09-05 PROCEDURE — 99212 OFFICE O/P EST SF 10 MIN: CPT | Mod: ZF

## 2017-09-05 PROCEDURE — 96413 CHEMO IV INFUSION 1 HR: CPT

## 2017-09-05 RX ORDER — ALBUTEROL SULFATE 90 UG/1
1-2 AEROSOL, METERED RESPIRATORY (INHALATION)
Status: CANCELLED
Start: 2017-09-05

## 2017-09-05 RX ORDER — LORAZEPAM 2 MG/ML
0.5 INJECTION INTRAMUSCULAR EVERY 4 HOURS PRN
Status: CANCELLED
Start: 2017-09-05

## 2017-09-05 RX ORDER — ALBUTEROL SULFATE 0.83 MG/ML
2.5 SOLUTION RESPIRATORY (INHALATION)
Status: CANCELLED | OUTPATIENT
Start: 2017-09-05

## 2017-09-05 RX ORDER — FLUOROURACIL 50 MG/ML
400 INJECTION, SOLUTION INTRAVENOUS ONCE
Status: COMPLETED | OUTPATIENT
Start: 2017-09-05 | End: 2017-09-05

## 2017-09-05 RX ORDER — PALONOSETRON 0.05 MG/ML
0.25 INJECTION, SOLUTION INTRAVENOUS ONCE
Status: COMPLETED | OUTPATIENT
Start: 2017-09-05 | End: 2017-09-05

## 2017-09-05 RX ORDER — HEPARIN SODIUM (PORCINE) LOCK FLUSH IV SOLN 100 UNIT/ML 100 UNIT/ML
5 SOLUTION INTRAVENOUS ONCE
Status: COMPLETED | OUTPATIENT
Start: 2017-09-05 | End: 2017-09-05

## 2017-09-05 RX ORDER — PALONOSETRON 0.05 MG/ML
0.25 INJECTION, SOLUTION INTRAVENOUS ONCE
Status: CANCELLED | OUTPATIENT
Start: 2017-09-05 | End: 2017-09-05

## 2017-09-05 RX ORDER — DEXAMETHASONE 4 MG/1
TABLET ORAL
Qty: 6 TABLET | Refills: 1 | Status: SHIPPED | OUTPATIENT
Start: 2017-09-05 | End: 2017-10-17

## 2017-09-05 RX ORDER — EPINEPHRINE 1 MG/ML
0.3 INJECTION INTRAMUSCULAR; INTRAVENOUS; SUBCUTANEOUS EVERY 5 MIN PRN
Status: CANCELLED | OUTPATIENT
Start: 2017-09-05

## 2017-09-05 RX ORDER — FLUOROURACIL 50 MG/ML
400 INJECTION, SOLUTION INTRAVENOUS ONCE
Status: CANCELLED | OUTPATIENT
Start: 2017-09-05

## 2017-09-05 RX ORDER — DIPHENHYDRAMINE HYDROCHLORIDE 50 MG/ML
50 INJECTION INTRAMUSCULAR; INTRAVENOUS
Status: CANCELLED
Start: 2017-09-05

## 2017-09-05 RX ORDER — METHYLPREDNISOLONE SODIUM SUCCINATE 125 MG/2ML
125 INJECTION, POWDER, LYOPHILIZED, FOR SOLUTION INTRAMUSCULAR; INTRAVENOUS
Status: CANCELLED
Start: 2017-09-05

## 2017-09-05 RX ORDER — SODIUM CHLORIDE 9 MG/ML
1000 INJECTION, SOLUTION INTRAVENOUS CONTINUOUS PRN
Status: CANCELLED
Start: 2017-09-05

## 2017-09-05 RX ORDER — EPINEPHRINE 0.3 MG/.3ML
0.3 INJECTION SUBCUTANEOUS EVERY 5 MIN PRN
Status: CANCELLED | OUTPATIENT
Start: 2017-09-05

## 2017-09-05 RX ORDER — MEPERIDINE HYDROCHLORIDE 25 MG/ML
25 INJECTION INTRAMUSCULAR; INTRAVENOUS; SUBCUTANEOUS EVERY 30 MIN PRN
Status: CANCELLED | OUTPATIENT
Start: 2017-09-05

## 2017-09-05 RX ADMIN — OXALIPLATIN 173 MG: 5 INJECTION, SOLUTION, CONCENTRATE INTRAVENOUS at 14:05

## 2017-09-05 RX ADMIN — SODIUM CHLORIDE, PRESERVATIVE FREE 5 ML: 5 INJECTION INTRAVENOUS at 08:09

## 2017-09-05 RX ADMIN — FLUOROURACIL 1220 MG: 50 INJECTION, SOLUTION INTRAVENOUS at 16:08

## 2017-09-05 RX ADMIN — SODIUM CHLORIDE 150 MG: 9 INJECTION, SOLUTION INTRAVENOUS at 13:37

## 2017-09-05 RX ADMIN — LEUCOVORIN CALCIUM 1050 MG: 500 INJECTION, POWDER, LYOPHILIZED, FOR SOLUTION INTRAMUSCULAR; INTRAVENOUS at 14:03

## 2017-09-05 RX ADMIN — PALONOSETRON HYDROCHLORIDE 0.25 MG: 0.25 INJECTION INTRAVENOUS at 13:19

## 2017-09-05 RX ADMIN — DEXTROSE MONOHYDRATE 500 ML: 50 INJECTION, SOLUTION INTRAVENOUS at 13:19

## 2017-09-05 RX ADMIN — DEXAMETHASONE SODIUM PHOSPHATE: 10 INJECTION, SOLUTION INTRAMUSCULAR; INTRAVENOUS at 13:23

## 2017-09-05 ASSESSMENT — PAIN SCALES - GENERAL: PAINLEVEL: NO PAIN (0)

## 2017-09-05 NOTE — PROGRESS NOTES
Infusion Nursing Note:    Patient presents today for Cycle 9 Oxaliplatin, Leucovorin, Fluorouracil bolus/pump.  Arrived with sister.  Patient met with Dr. Dee prior to infusion.    Lab Results   Component Value Date    HGB 13.9 09/05/2017     Lab Results   Component Value Date    WBC 4.1 09/05/2017      Lab Results   Component Value Date    ANEU 2.4 09/05/2017     Lab Results   Component Value Date     09/05/2017      Lab Results   Component Value Date     09/05/2017                   Lab Results   Component Value Date    POTASSIUM 3.9 09/05/2017           No results found for: MAG         Lab Results   Component Value Date    CR 0.76 09/05/2017                   Lab Results   Component Value Date    NIDHI 8.6 09/05/2017                Lab Results   Component Value Date    BILITOTAL 0.6 09/05/2017           Lab Results   Component Value Date    ALBUMIN 3.1 09/05/2017                    Lab Results   Component Value Date    ALT 26 09/05/2017           Lab Results   Component Value Date    AST 27 09/05/2017     Results reviewed, labs MET treatment parameters, ok to proceed with treatment.        Note: N/A.    Intravenous Access:  Implanted Port.    Post Infusion Assessment:  Patient tolerated infusion without incident.  Blood return noted pre and post infusion.  Fluorouracil C series pump hooked up at 1615 to run at 5.2cc/hr for 46 hours. [facility] arranged to disconnect pump at [facility] Thursday 9/7 at 1400.     Discharge Plan:   Prescription refills given for decadron.  Copy of AVS reviewed with patient and/or family.  Patient will return 9/19 for next appointment.  Patient discharged in stable condition accompanied by: self.  Departure Mode: Ambulatory.  Face to Face time: 0.

## 2017-09-05 NOTE — PROGRESS NOTES
Oncology Visit:  September 5, 2017         HISTORY OF PRESENT ILLNESS:  The patient reports that initially he noticed symptoms of dysphagia, mainly to solid food, starting in February of 2017.  Symptoms got progressively worse.  Currently, he is unable to eat solid food, and he needs to take a lot of fluid to push food down.  His dysphagia symptoms prompted subsequent evaluation with upper endoscopy and colonoscopy that was done in [place]. The colonoscopy revealed two tubular adenomas in the colon.  The upper endoscopy revealed an ulcerated mass 1-2 cm long, approximately 40 cm from the lips.  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  The patient also had a PET/CT scan done that revealed widespread lavon metastasis and multiple liver mets that were consistent with metastatic disease. Her 2 negative.      He returns today after 8 cycles of FOLFOX chemotherapy.  He is feeling much better.  He is eating much better and is no longer having dysphagia.  He has had some difficulty with nausea and constipation during his chemotherapy.  With the addition of Emend, he is doing much better.    He has grade 1 neuropathy.  He continues to work FT in [place].  He reports fatigue days 4-5 and wants to rest most of these days.      In addition to the fatigue, his biggest complaint is emotional fatigue and feeling worn out with the repetitive cycles of therapy as well as his terminal diagnosis.     ROS: 10 point ROS neg other than the symptoms noted above in the HPI.       His past medical history is significant only for isolated intermittent atrial fibrillation for which he is intermittently taking a baby aspirin.  He denies any other significant past medical history     SOCIAL HISTORY:  He has a remote history of smoking, about 20-pack years; he quit 10 years ago.  He denies alcohol or illicit drugs.  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  He  has a sister who lives in the city, and he has a 22-year-old son.           Past Medical/Surgical History:  Past Medical History:   Diagnosis Date     Atrial fibrillation (H)      Cancer (H)     esophageal     Glaucoma      Glaucoma      Past Surgical History:   Procedure Laterality Date     AMPUTATION      toe     ARTHROSCOPY KNEE       bunion surgery       CHOLECYSTECTOMY       INSERT PORT VASCULAR ACCESS Right 5/9/2017    Procedure: INSERT PORT VASCULAR ACCESS;  Single Lumen Chest Power Port;  Surgeon: Jasiel Hu PA-C;  [facility]   Allergies:  Allergies as of 09/05/2017 - David as Reviewed 09/05/2017   Allergen Reaction Noted     Penicillin g Other (See Comments) 01/19/2017     Penicillins Unknown 04/27/2017     Current Medications:  Current Outpatient Prescriptions   Medication Sig Dispense Refill     rivaroxaban ANTICOAGULANT (XARELTO) 20 MG TABS tablet Take 1 tablet (20 mg) by mouth daily (with dinner) 90 tablet 3     zolpidem (AMBIEN CR) 6.25 MG CR tablet Take 1 tablet (6.25 mg) by mouth nightly as needed for sleep 60 tablet 0     ondansetron (ZOFRAN) 8 MG tablet Take 1 tablet (8 mg) by mouth every 8 hours as needed (Nausea/Vomiting) 30 tablet 2     lidocaine-prilocaine (EMLA) cream Apply topically as needed for moderate pain 30 g 3     prochlorperazine (COMPAZINE) 10 MG tablet Take 1 tablet (10 mg) by mouth every 6 hours as needed (Nausea/Vomiting) 30 tablet 2     LORazepam (ATIVAN) 0.5 MG tablet Take 1 tablet (0.5 mg) by mouth every 6 hours as needed for anxiety 60 tablet 1     multivitamin, therapeutic with minerals (MULTI-VITAMIN) TABS tablet Take 1 tablet by mouth daily       fish oil-omega-3 fatty acids 1000 MG capsule Take 2 g by mouth daily       IBUPROFEN PO Take 800 mg by mouth every 8 hours as needed for moderate pain       latanoprost (XALATAN) 0.005 % ophthalmic solution Place into both eyes At Bedtime       timolol (TIMOPTIC) 0.5 % ophthalmic solution Place into both eyes  daily       zolpidem (AMBIEN CR) 12.5 MG CR tablet Take 1 tablet (12.5 mg) by mouth nightly as needed for sleep (Patient not taking: Reported on 9/5/2017) 30 tablet 1      Family History:    Maternal grandmother had breast cancer diagnosed at her 50's.Paternal aunt had some gynecologic cancer    Social History:  Social History     Social History     Marital status: Single     Spouse name: N/A     Number of children: N/A     Years of education: N/A     Occupational History     Not on file.     Social History Main Topics     Smoking status: Former Smoker     Smokeless tobacco: Never Used     Alcohol use Yes      Comment: occasional     Drug use: No      Comment: h/o over 30 years ago     Sexual activity: Not on file     Other Topics Concern     Not on file     Social History Narrative     Physical Exam:  /72  Pulse 92  Temp 98  F (36.7  C) (Oral)  Resp 16  Wt (!) 166.1 kg (366 lb 1.6 oz)  SpO2 95%  BMI 42.32 kg/m2    GENERAL APPEARANCE: healthy, alert and no distress     HENT: Mouth without ulcers or lesions     NECK: no adenopathy, no asymmetry or masses     LYMPHATICS: No cervical, supraclavicular, axillary or inguinal lymphadenopathy appreciated     RESP: lungs clear to auscultation - no rales, rhonchi or wheezes     CARDIOVASCULAR: regular rates and rhythm, normal S1 S2, no S3 or S4 and no murmur.     ABDOMEN:  soft, nontender, no HSM or masses and bowel sounds normal     MUSCULOSKELETAL: No edema b/l LE.     SKIN: no suspicious lesions or rashes     PSYCHIATRIC: mentation appears normal and affect normal        Lab Results   Component Value Date    WBC 4.1 09/05/2017     Lab Results   Component Value Date    RBC 4.24 09/05/2017     Lab Results   Component Value Date    HGB 13.9 09/05/2017     Lab Results   Component Value Date    HCT 40.6 09/05/2017     No components found for: MCT  Lab Results   Component Value Date    MCV 96 09/05/2017     Lab Results   Component Value Date    MCH 32.8 09/05/2017      Lab Results   Component Value Date    MCHC 34.2 09/05/2017     Lab Results   Component Value Date    RDW 13.5 09/05/2017     Lab Results   Component Value Date     09/05/2017     Recent Labs   Lab Test  09/05/17   0817  08/22/17   0825   NA  140  144   POTASSIUM  3.9  3.8   CHLORIDE  109  113*   CO2  26  24   ANIONGAP  6  7   GLC  102*  123*   BUN  12  12   CR  0.76  0.72   NIDHI  8.6  8.4*     Liver Function Studies -   Recent Labs   Lab Test  09/05/17   0817   PROTTOTAL  7.5   ALBUMIN  3.1*   BILITOTAL  0.6   ALKPHOS  93   AST  27   ALT  26          ASSESSMENT/PLAN:  1.  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  This corresponds to stage IV cancer.      He is tolerating FOLFOX well and has had a good partial response to his treatment.  We reviewed his scans today and he continues to have an excellent response.   Will continue FOLFOX.  Will reduce oxaliplatin to 75% given neuropathy and thrombocytopenia.   Neuropathy will need to be monitored closely.  I discussed with him that I would like him to have restaging in 8 weeks.  Will arrange for this imaging.    2. Nausea -improved on Emend and decadron.  Given his fatigue, I want him to add decadron on days 4,5.      3.  A-Fib.  He has intermittent a-fib. Currently asymptomatic and in sinus rhythm.  He is advised to continue aspirin.     4.  incidental PE identified on imaging in June 2017 -  Pt remains on Xarelto.  I asked him to take this regularly.    We reviewed his metastatic prognosis.  He was very emotional about this today discussing fatigue with treatment and working and contemplating moving to the [employer] in October.      Recommended he see Dr Darrius Lacy.  We discussed that was may need to hold his chemotherapy or provide a drug holiday in October while he is moving and packing etc.    Additional supportive cares  provided.    Given his family history of breast and possible GYN cancer, will add GERMAIN testing on his primary tumor, and consider genetics referral.     Kadi Dee MD

## 2017-09-05 NOTE — PATIENT INSTRUCTIONS
Contact Numbers    [facility] [place]: [phone]  [facility] [place]:  [phone]    Call triage with chills and/or temperature greater than or equal to 100.5, uncontrolled nausea/vomiting, diarrhea, constipation, dizziness, shortness of breath, chest pain, bleeding, unexplained bruising, or any new/concerning symptoms, questions/concerns.     If you are having any concerning symptoms or wish to speak to a provider before your next infusion visit, please call your care coordinator or triage to notify them so we can adequately serve you.       After Hours: [phone]    If after hours, weekends, or holidays, call main hospital  and ask for Oncology doctor on call.           September 2017 Sunday Monday Tuesday Wednesday Thursday Friday Saturday                            1     2       3     4     5     [facility] LAB DRAW    8:15 AM   (15 min.)    [facility] LAB DRAW   [facility] Lab Draw     CT CHEST/ABDOMEN/PELVIS W    8:45 AM   (20 min.)   UCCT2   [facility] CT     P RETURN   11:15 AM   (30 min.)   Kadi Dee MD   [facility] ONC INFUSION 240    1:00 PM   (240 min.)    ONCOLOGY INFUSION   [facility] 6     7     8     9       10     11     12     13     14     15     16       17     18     19     [facility] DRAW    8:00 AM   (15 min.)    [facility]   [facility]     P RETURN    8:25 AM   (50 min.)   Loraine Sutherland PA-C   [facility] WITH ROOM    9:45 AM   (60 min.)   Dao Lacy PsyD   [facility] ONC INFUSION 240   11:00 AM   (240 min.)   [facility] ONCOLOGY INFUSION   [facility] 20     21     22     23       24     25     26     27     28     29     30 October 2017 Sunday Monday Tuesday Wednesday Thursday Friday Saturday   1     2     [facility] DRAW   11:15 AM   (15 min.)    [facility]     [facility]     [facility] RETURN   11:45 AM   (30 min.)   Kadi Dee MD   [facility]   12:30 PM   (240 min.)    ONCOLOGY INFUSION   [facility] 3     4     5     6     7       8     9     10     11     12     13     14       15     16     17     [facility] DRAW   10:15 AM   (15 min.)    [facility] LAB DRAW   [facility] Lab Draw     [facility] RETURN   10:45 AM   (50 min.)   Katalina Ramirez PA-C   [facility]   11:30 AM   (240 min.)    ONCOLOGY INFUSION   [facility] 18     19     20     21       22     23     24     25     26     27     28       29     30     [facility] DRAW   10:15 AM   (15 min.)    [facility] LAB DRAW   [facility] Lab Draw     [facility] RETURN   12:45 PM   (30 min.)   Kadi Dee MD   [facility] ONC INFUSION 240    1:30 PM   (240 min.)    ONCOLOGY INFUSION   [facility] 31                                     Recent Results (from the past 24 hour(s))   *CBC with platelets differential    Collection Time: 09/05/17  8:17 AM   Result Value Ref Range    WBC 4.1 4.0 - 11.0 10e9/L    RBC Count 4.24 (L) 4.4 - 5.9 10e12/L    Hemoglobin 13.9 13.3 - 17.7 g/dL    Hematocrit 40.6 40.0 - 53.0 %    MCV 96 78 - 100 fl    MCH 32.8 26.5 - 33.0 pg    MCHC 34.2 31.5 - 36.5 g/dL    RDW 13.5 10.0 - 15.0 %    Platelet Count 122 (L) 150 - 450 10e9/L    Diff Method Automated Method     % Neutrophils 59.6 %    % Lymphocytes 29.3 %    % Monocytes 9.6 %    % Eosinophils 0.5 %    % Basophils 0.5 %    % Immature Granulocytes 0.5 %    Nucleated RBCs 0 0 /100    Absolute Neutrophil 2.4 1.6 - 8.3 10e9/L    Absolute Lymphocytes 1.2 0.8 - 5.3 10e9/L    Absolute Monocytes 0.4 0.0 - 1.3 10e9/L    Absolute Eosinophils 0.0 0.0 - 0.7 10e9/L    Absolute Basophils 0.0 0.0 - 0.2 10e9/L    Abs Immature Granulocytes 0.0 0 -  0.4 10e9/L    Absolute Nucleated RBC 0.0    Comprehensive metabolic panel    Collection Time: 09/05/17  8:17 AM   Result Value Ref Range    Sodium 140 133 - 144 mmol/L    Potassium 3.9 3.4 - 5.3 mmol/L    Chloride 109 94 - 109 mmol/L    Carbon Dioxide 26 20 - 32 mmol/L    Anion Gap 6 3 - 14 mmol/L    Glucose 102 (H) 70 - 99 mg/dL    Urea Nitrogen 12 7 - 30 mg/dL    Creatinine 0.76 0.66 - 1.25 mg/dL    GFR Estimate >90 >60 mL/min/1.7m2    GFR Estimate If Black >90 >60 mL/min/1.7m2    Calcium 8.6 8.5 - 10.1 mg/dL    Bilirubin Total 0.6 0.2 - 1.3 mg/dL    Albumin 3.1 (L) 3.4 - 5.0 g/dL    Protein Total 7.5 6.8 - 8.8 g/dL    Alkaline Phosphatase 93 40 - 150 U/L    ALT 26 0 - 70 U/L    AST 27 0 - 45 U/L

## 2017-09-05 NOTE — LETTER
9/5/2017       RE: Reuben Padilla  [address]   [place] [address]     Dear Colleague,    Thank you for referring your patient, Reuben Padilla, to the [facility]. Please see a copy of my visit note below.    Oncology Visit:  September 5, 2017         HISTORY OF PRESENT ILLNESS:  The patient reports that initially he noticed symptoms of dysphagia, mainly to solid food, starting in February of 2017.  Symptoms got progressively worse.  Currently, he is unable to eat solid food, and he needs to take a lot of fluid to push food down.  His dysphagia symptoms prompted subsequent evaluation with upper endoscopy and colonoscopy that was done in [place]. The colonoscopy revealed two tubular adenomas in the colon.  The upper endoscopy revealed an ulcerated mass 1-2 cm long, approximately 40 cm from the lips.  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  The patient also had a PET/CT scan done that revealed widespread lavon metastasis and multiple liver mets that were consistent with metastatic disease. Her 2 negative.      He returns today after 8 cycles of FOLFOX chemotherapy.  He is feeling much better.  He is eating much better and is no longer having dysphagia.  He has had some difficulty with nausea and constipation during his chemotherapy.  With the addition of Emend, he is doing much better.    He has grade 1 neuropathy.  He continues to work FT in [place].  He reports fatigue days 4-5 and wants to rest most of these days.      In addition to the fatigue, his biggest complaint is emotional fatigue and feeling worn out with the repetitive cycles of therapy as well as his terminal diagnosis.     ROS: 10 point ROS neg other than the symptoms noted above in the HPI.       His past medical history is significant only for isolated intermittent atrial fibrillation for which he is intermittently taking a baby aspirin.  He  denies any other significant past medical history     SOCIAL HISTORY:  He has a remote history of smoking, about 20-pack years; he quit 10 years ago.  He denies alcohol or illicit drugs.  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  He has a sister who lives in the city, and he has a 22-year-old son.           Past Medical/Surgical History:  Past Medical History:   Diagnosis Date     Atrial fibrillation (H)      Cancer (H)     esophageal     Glaucoma      Glaucoma      Past Surgical History:   Procedure Laterality Date     AMPUTATION      toe     ARTHROSCOPY KNEE       bunion surgery       CHOLECYSTECTOMY       INSERT PORT VASCULAR ACCESS Right 5/9/2017    Procedure: INSERT PORT VASCULAR ACCESS;  Single Lumen Chest Power Port;  Surgeon: Jasiel Hu PA-C;  [facility]   Allergies:  Allergies as of 09/05/2017 - David as Reviewed 09/05/2017   Allergen Reaction Noted     Penicillin g Other (See Comments) 01/19/2017     Penicillins Unknown 04/27/2017     Current Medications:  Current Outpatient Prescriptions   Medication Sig Dispense Refill     rivaroxaban ANTICOAGULANT (XARELTO) 20 MG TABS tablet Take 1 tablet (20 mg) by mouth daily (with dinner) 90 tablet 3     zolpidem (AMBIEN CR) 6.25 MG CR tablet Take 1 tablet (6.25 mg) by mouth nightly as needed for sleep 60 tablet 0     ondansetron (ZOFRAN) 8 MG tablet Take 1 tablet (8 mg) by mouth every 8 hours as needed (Nausea/Vomiting) 30 tablet 2     lidocaine-prilocaine (EMLA) cream Apply topically as needed for moderate pain 30 g 3     prochlorperazine (COMPAZINE) 10 MG tablet Take 1 tablet (10 mg) by mouth every 6 hours as needed (Nausea/Vomiting) 30 tablet 2     LORazepam (ATIVAN) 0.5 MG tablet Take 1 tablet (0.5 mg) by mouth every 6 hours as needed for anxiety 60 tablet 1     multivitamin, therapeutic with minerals (MULTI-VITAMIN) TABS tablet Take 1 tablet by mouth daily       fish oil-omega-3 fatty acids 1000 MG capsule Take 2 g by mouth daily        IBUPROFEN PO Take 800 mg by mouth every 8 hours as needed for moderate pain       latanoprost (XALATAN) 0.005 % ophthalmic solution Place into both eyes At Bedtime       timolol (TIMOPTIC) 0.5 % ophthalmic solution Place into both eyes daily       zolpidem (AMBIEN CR) 12.5 MG CR tablet Take 1 tablet (12.5 mg) by mouth nightly as needed for sleep (Patient not taking: Reported on 9/5/2017) 30 tablet 1      Family History:    Maternal grandmother had breast cancer diagnosed at her 50's.Paternal aunt had some gynecologic cancer    Social History:  Social History     Social History     Marital status: Single     Spouse name: N/A     Number of children: N/A     Years of education: N/A     Occupational History     Not on file.     Social History Main Topics     Smoking status: Former Smoker     Smokeless tobacco: Never Used     Alcohol use Yes      Comment: occasional     Drug use: No      Comment: h/o over 30 years ago     Sexual activity: Not on file     Other Topics Concern     Not on file     Social History Narrative     Physical Exam:  /72  Pulse 92  Temp 98  F (36.7  C) (Oral)  Resp 16  Wt (!) 166.1 kg (366 lb 1.6 oz)  SpO2 95%  BMI 42.32 kg/m2    GENERAL APPEARANCE: healthy, alert and no distress     HENT: Mouth without ulcers or lesions     NECK: no adenopathy, no asymmetry or masses     LYMPHATICS: No cervical, supraclavicular, axillary or inguinal lymphadenopathy appreciated     RESP: lungs clear to auscultation - no rales, rhonchi or wheezes     CARDIOVASCULAR: regular rates and rhythm, normal S1 S2, no S3 or S4 and no murmur.     ABDOMEN:  soft, nontender, no HSM or masses and bowel sounds normal     MUSCULOSKELETAL: No edema b/l LE.     SKIN: no suspicious lesions or rashes     PSYCHIATRIC: mentation appears normal and affect normal        Lab Results   Component Value Date    WBC 4.1 09/05/2017     Lab Results   Component Value Date    RBC 4.24 09/05/2017     Lab Results   Component Value Date     HGB 13.9 09/05/2017     Lab Results   Component Value Date    HCT 40.6 09/05/2017     No components found for: MCT  Lab Results   Component Value Date    MCV 96 09/05/2017     Lab Results   Component Value Date    MCH 32.8 09/05/2017     Lab Results   Component Value Date    MCHC 34.2 09/05/2017     Lab Results   Component Value Date    RDW 13.5 09/05/2017     Lab Results   Component Value Date     09/05/2017     Recent Labs   Lab Test  09/05/17   0817  08/22/17   0825   NA  140  144   POTASSIUM  3.9  3.8   CHLORIDE  109  113*   CO2  26  24   ANIONGAP  6  7   GLC  102*  123*   BUN  12  12   CR  0.76  0.72   NIDHI  8.6  8.4*     Liver Function Studies -   Recent Labs   Lab Test  09/05/17 0817   PROTTOTAL  7.5   ALBUMIN  3.1*   BILITOTAL  0.6   ALKPHOS  93   AST  27   ALT  26          ASSESSMENT/PLAN:  1.  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  This corresponds to stage IV cancer.      He is tolerating FOLFOX well and has had a good partial response to his treatment.  We reviewed his scans today and he continues to have an excellent response.   Will continue FOLFOX.  Will reduce oxaliplatin to 75% given neuropathy and thrombocytopenia.   Neuropathy will need to be monitored closely.  I discussed with him that I would like him to have restaging in 8 weeks.  Will arrange for this imaging.    2. Nausea -improved on Emend and decadron.  Given his fatigue, I want him to add decadron on days 4,5.      3.  A-Fib.  He has intermittent a-fib. Currently asymptomatic and in sinus rhythm.  He is advised to continue aspirin.     4.  incidental PE identified on imaging in June 2017 -  Pt remains on Xarelto.  I asked him to take this regularly.    We reviewed his metastatic prognosis.  He was very emotional about this today discussing fatigue with treatment and working and contemplating moving  to the [facility] in October.      Recommended he see Dr Darrius Lacy.  We discussed that was may need to hold his chemotherapy or provide a drug holiday in October while he is moving and packing etc.    Additional supportive cares provided.    Given his family history of breast and possible GYN cancer, will add GERMAIN testing on his primary tumor, and consider genetics referral.     Kadi Dee MD

## 2017-09-05 NOTE — NURSING NOTE
Chief Complaint   Patient presents with     Port Draw     Labs drawn by RN from port. VS taken.      JOSIANE BUSCH RN

## 2017-09-05 NOTE — NURSING NOTE
"Oncology Rooming Note    September 5, 2017 10:48 AM   Reuben Padilla is a 57 year old male who presents for:    Chief Complaint   Patient presents with     Port Draw     Labs drawn by RN from port. VS taken.      Oncology Clinic Visit     Return-Esophageal Ca     Initial Vitals: /72  Pulse 92  Temp 98  F (36.7  C) (Oral)  Resp 16  Wt (!) 166.1 kg (366 lb 1.6 oz)  SpO2 95%  BMI 42.32 kg/m2 Estimated body mass index is 42.32 kg/(m^2) as calculated from the following:    Height as of 8/22/17: 1.981 m (6' 5.99\").    Weight as of this encounter: 166.1 kg (366 lb 1.6 oz). Body surface area is 3.02 meters squared.  No Pain (0) Comment: Data Unavailable   No LMP for male patient.  Allergies reviewed: Yes  Medications reviewed: Yes    Medications: Medication refills not needed today.  Pharmacy name entered into [employer]:    [id number] - [place] [place] - [address] [place] [address] AT INTERSECTION [address] [place], [place] - [address] [facility] [id number]    Clinical concerns: No new concerns    6 minutes for nursing intake (face to face time)     Marlena Cornelius LPN            "

## 2017-09-05 NOTE — MR AVS SNAPSHOT
After Visit Summary   9/5/2017    Reuben Padilla    MRN: [id number]           Patient Information     Date Of Birth          1960        Visit Information        Provider Department      9/5/2017 11:30 AM Kadi Dee MD [facility]        Today's Diagnoses     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)          Care Instructions    Levi,    It was nice to see you today.      Your scans today are all stable with some improvement.  I'd like you to stay on your current therapy.  As we discussed, we will try oral decadron on days 4,5 and see if this helps with your fatigue.  We will see how you do with these changes; we may take a drug holiday in October.    In review of your history, I'd like you to see our genetic counselors.  I'm going to make a referral.    We will also set you up with Dr Darrius Lacy, one of our psychologists.      Continue Xarelto.    Let me know if you have any questions.    Kadi Dee MD          Follow-ups after your visit        Additional Services     CANCER RISK MGMT/CANCER GENETIC COUNSELING REFERRAL       Your provider has referred you to the Cancer Risk Management Program - Cancer Genetic Counseling.    Reason for Referral: 57 with esophageal adenocarcinoma, family h/o breast/ gyn cancers    We have a sent a notice to a staff member of the Cancer Risk Management Program to give you a call to assist with scheduling your appointment.  You may also call  [phone] ([phone]) to initiate scheduling.    Please be aware that coverage of these services is subject to the terms and limitations of your health insurance plan.  Call member services at your health plan with any benefit or coverage questions.      Please bring the completed family history sheet to your appointment in addition to any available outside medical records documenting your cancer diagnosis.                  Your next 10 appointments already scheduled     Sep 19,  2017  8:00 AM CDT   [facility] with [facility]   [facility] ([place])    [address] [address]   [phone]            Sep 19, 2017  8:40 AM CDT   (Arrive by 8:25 AM)   Return Visit with Loraine Sutherland PA-C   [facility] [place] ([place])    [address] [place] [address]   [phone]            Sep 19, 2017 10:00 AM CDT   (Arrive by 9:45 AM)   NEW WITH ROOM with Dao Lacy PsyD,  2 116 CONSULT RM   [facility] [place] ([place]    [address] [place] [address]   [phone]            Sep 19, 2017 11:00 AM CDT   Infusion 240 with UC ONCOLOGY INFUSION, UC 31 ATC   [facility] [place] ([place]    [address] [place] [address]   [phone]            Oct 02, 2017 11:15 AM CDT   [facility] Draw with [facility] DRAW   [facility] ([place])    [address] [place] [address]   [phone]            Oct 02, 2017 12:00 PM CDT   (Arrive by 11:45 AM)   Return Visit with Kadi Dee MD   [facility] [place] ([place]    [address] [place] [address]   [phone]            Oct 02, 2017 12:30 PM CDT   Infusion 240 with UC ONCOLOGY INFUSION, UC 21 ATC   [facility] [place] ([place]    [address] [place] [address]   [phone]              Future tests that were ordered for you today     Open Future Orders        Priority Expected Expires Ordered    CT Chest/Abdomen/Pelvis w Contrast Routine  4/3/2018 9/5/2017            Who to contact     If you have questions or need follow up information  about today's clinic visit or your schedule please contact [facility] directly at [phone].  Normal or non-critical lab and imaging results will be communicated to you by [employer], letter or phone within 4 business days after the clinic has received the results. If you do not hear from us within 7 days, please contact the clinic through [employer] or phone. If you have a critical or abnormal lab result, we will notify you by phone as soon as possible.  Submit refill requests through "[employer]" or call your pharmacy and they will forward the refill request to us. Please allow 3 business days for your refill to be completed.          Additional Information About Your Visit        [employer] Information     "[employer]" gives you secure access to your electronic health record. If you see a primary care provider, you can also send messages to your care team and make appointments. If you have questions, please call your primary care clinic.  If you do not have a primary care provider, please call [phone] and they will assist you.        Care EveryWhere ID     This is your Care EveryWhere ID. This could be used by other organizations to access your [place] medical records  [id number]        Your Vitals Were     Pulse Temperature Respirations Pulse Oximetry BMI (Body Mass Index)       92 98  F (36.7  C) (Oral) 16 95% 42.32 kg/m2        Blood Pressure from Last 3 Encounters:   09/05/17 107/72   08/22/17 122/77   08/21/17 130/86    Weight from Last 3 Encounters:   09/05/17 (!) 166.1 kg (366 lb 1.6 oz)   08/22/17 (!) 168 kg (370 lb 4.8 oz)   08/21/17 (!) 166.9 kg (367 lb 14.4 oz)              We Performed the Following     *CBC with platelets differential     CANCER RISK MGMT/CANCER GENETIC COUNSELING REFERRAL     Comprehensive metabolic panel     HCL MICROSATELLITE INSTABILITY          Today's Medication Changes          These changes are accurate as of: 9/5/17  9:54 PM.  If you have any questions, ask your  nurse or doctor.               Start taking these medicines.        Dose/Directions    dexamethasone 4 MG tablet   Commonly known as:  DECADRON   Used for:  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)        Take two tablets daily on days 2,3 and then 1 tablet daily days 4,5   Quantity:  6 tablet   Refills:  1            Where to get your medicines      These medications were sent to [facility] [place], [place] - [address]  [address], [facility] [address]    Hours:  TRANSPLANT PHONE NUMBER [phone] Phone:  [phone]     dexamethasone 4 MG tablet                Primary Care Provider    Unknown Primary MD Sydney       No address on file        Equal Access to Services     [facility]: Hadii antonio Dunlap, wajeanda ryan, qaybta kaalmada ashleigh, geraldo washington . So [facility] [phone].    ATENCIÓN: Si habla español, tiene a alamo disposición servicios gratuitos de asistencia lingüística. Llame al [phone].    We comply with applicable federal civil rights laws and [place] laws. We do not discriminate on the basis of race, color, national origin, age, disability sex, sexual orientation or gender identity.            Thank you!     Thank you for choosing [facility]  for your care. Our goal is always to provide you with excellent care. Hearing back from our patients is one way we can continue to improve our services. Please take a few minutes to complete the written survey that you may receive in the mail after your visit with us. Thank you!             Your Updated Medication List - Protect others around you: Learn how to safely use, store and throw away your medicines at www.disposemymeds.org.          This list is accurate as of: 9/5/17  9:54 PM.  Always use your most recent med list.                   Brand Name Dispense Instructions for use Diagnosis    dexamethasone 4 MG tablet    DECADRON    6  tablet    Take two tablets daily on days 2,3 and then 1 tablet daily days 4,5    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)       fish oil-omega-3 fatty acids 1000 MG capsule      Take 2 g by mouth daily        IBUPROFEN PO      Take 800 mg by mouth every 8 hours as needed for moderate pain        latanoprost 0.005 % ophthalmic solution    XALATAN     Place into both eyes At Bedtime    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)       lidocaine-prilocaine cream    EMLA    30 g    Apply topically as needed for moderate pain    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)       LORazepam 0.5 MG tablet    ATIVAN    60 tablet    Take 1 tablet (0.5 mg) by mouth every 6 hours as needed for anxiety    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), 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       Multi-vitamin Tabs tablet      Take 1 tablet by mouth daily        ondansetron 8 MG tablet    ZOFRAN    30 tablet    Take 1 tablet (8 mg) by mouth every 8 hours as needed (Nausea/Vomiting)    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)       prochlorperazine 10 MG tablet    COMPAZINE    30 tablet    Take 1 tablet (10 mg) by mouth every 6 hours as needed (Nausea/Vomiting)    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)       rivaroxaban ANTICOAGULANT 20 MG Tabs tablet    XARELTO    90 tablet    Take 1 tablet (20 mg) by mouth daily (with dinner)    Persistent atrial fibrillation (H), Other pulmonary embolism without acute cor pulmonale (H), 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)       timolol 0.5 % ophthalmic solution    TIMOPTIC     Place into both eyes daily    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)       * zolpidem 12.5 MG CR tablet    AMBIEN CR    30 tablet    Take 1 tablet (12.5 mg) by mouth nightly as needed for sleep    Insomnia, unspecified type       * zolpidem 6.25 MG CR tablet    AMBIEN CR    60 tablet    Take 1 tablet (6.25 mg) by mouth nightly as needed for sleep    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)       * Notice:  This  list has 2 medication(s) that are the same as other medications prescribed for you. Read the directions carefully, and ask your doctor or other care provider to review them with you.

## 2017-09-06 NOTE — PATIENT INSTRUCTIONS
Levi,    It was nice to see you today.      Your scans today are all stable with some improvement.  I'd like you to stay on your current therapy.  As we discussed, we will try oral decadron on days 4,5 and see if this helps with your fatigue.  We will see how you do with these changes; we may take a drug holiday in October.    In review of your history, I'd like you to see our genetic counselors.  I'm going to make a referral.    We will also set you up with Dr Darrius Lacy, one of our psychologists.      Continue Xarelto.    Let me know if you have any questions.    Kadi Dee MD

## 2017-09-10 ENCOUNTER — TRANSFERRED RECORDS (OUTPATIENT)
Dept: HEALTH INFORMATION MANAGEMENT | Facility: CLINIC | Age: 57
End: 2017-09-10

## 2017-09-12 ENCOUNTER — CARE COORDINATION (OUTPATIENT)
Dept: ONCOLOGY | Facility: CLINIC | Age: 57
End: 2017-09-12

## 2017-09-12 NOTE — PROGRESS NOTES
"Received a message from Natali, pt's sister, who is reporting that Levi had a \"bad fork lift accident in [place]\"  She wants to know how we handle his cancer while he is recovering.   No further details left.  M for Natali at [phone] asking for a return call to discuss Levi's condition.  Will update Dr Dee once details are known.  "

## 2017-09-12 NOTE — PROGRESS NOTES
Natali returned writers call.  Levi was in a fort lift accident on [place] 9/10.   He has 7-8 broken vertebrae, 5 fractured non-displaced ribs, staples in head wound.  She reports the addition of steriods after tx was helpful.  She has spoken to Dr Dee and reports the plan to be : cancel 9/19 treatment and RTC on 10/2 for f/u with Dr Dee and treatment.  She agrees to update writer in a week to determine if travel for the 10/2 appointment is realistic, once this is known will request rescheduling of the Darrius Londono appointment.  Encouraged her to call with any additional questions or concerns.

## 2017-09-24 ASSESSMENT — ENCOUNTER SYMPTOMS
ALTERED TEMPERATURE REGULATION: 1
DECREASED CONCENTRATION: 1
JAUNDICE: 0
PARALYSIS: 0
FEVER: 0
RECTAL PAIN: 0
INCREASED ENERGY: 1
EYE PAIN: 0
FATIGUE: 0
BLOATING: 0
ARTHRALGIAS: 0
HEARTBURN: 0
DEPRESSION: 0
LIGHT-HEADEDNESS: 1
DOUBLE VISION: 0
WEAKNESS: 1
NECK PAIN: 0
CLAUDICATION: 0
JOINT SWELLING: 1
BACK PAIN: 1
EYE WATERING: 1
POOR WOUND HEALING: 0
TACHYCARDIA: 0
SEIZURES: 0
DIZZINESS: 1
SYNCOPE: 0
HYPERTENSION: 0
BOWEL INCONTINENCE: 0
NIGHT SWEATS: 0
DECREASED APPETITE: 0
CHILLS: 0
INSOMNIA: 1
EYE IRRITATION: 1
ABDOMINAL PAIN: 0
BLOOD IN STOOL: 0
PANIC: 0
PALPITATIONS: 0
TINGLING: 1
SLEEP DISTURBANCES DUE TO BREATHING: 0
SKIN CHANGES: 0
NAIL CHANGES: 0
CONSTIPATION: 1
LEG PAIN: 0
NAUSEA: 0
HALLUCINATIONS: 0
MUSCLE CRAMPS: 0
EYE REDNESS: 1
DIARRHEA: 1
NERVOUS/ANXIOUS: 0
WEIGHT LOSS: 0
HEADACHES: 1
VOMITING: 0
STIFFNESS: 0
DISTURBANCES IN COORDINATION: 1
EXERCISE INTOLERANCE: 1
MYALGIAS: 1
LOSS OF CONSCIOUSNESS: 0
NUMBNESS: 1
POLYPHAGIA: 0
SPEECH CHANGE: 0
HYPOTENSION: 0
WEIGHT GAIN: 0
POLYDIPSIA: 0
TREMORS: 0
RECTAL BLEEDING: 0
MUSCLE WEAKNESS: 0
MEMORY LOSS: 1
ORTHOPNEA: 0

## 2017-10-01 ENCOUNTER — APPOINTMENT (OUTPATIENT)
Dept: LAB | Facility: CLINIC | Age: 57
End: 2017-10-01
Attending: INTERNAL MEDICINE
Payer: COMMERCIAL

## 2017-10-02 ENCOUNTER — APPOINTMENT (OUTPATIENT)
Dept: LAB | Facility: CLINIC | Age: 57
End: 2017-10-02
Attending: INTERNAL MEDICINE
Payer: COMMERCIAL

## 2017-10-02 ENCOUNTER — INFUSION THERAPY VISIT (OUTPATIENT)
Dept: ONCOLOGY | Facility: CLINIC | Age: 57
End: 2017-10-02
Attending: INTERNAL MEDICINE
Payer: COMMERCIAL

## 2017-10-02 VITALS
OXYGEN SATURATION: 95 % | BODY MASS INDEX: 36.45 KG/M2 | SYSTOLIC BLOOD PRESSURE: 128 MMHG | HEART RATE: 101 BPM | TEMPERATURE: 97.2 F | RESPIRATION RATE: 18 BRPM | DIASTOLIC BLOOD PRESSURE: 92 MMHG | WEIGHT: 315 LBS | HEIGHT: 78 IN

## 2017-10-02 DIAGNOSIS — C79.9 METASTASIS FROM GASTRIC CANCER (H): Primary | ICD-10-CM

## 2017-10-02 DIAGNOSIS — C16.9 METASTASIS FROM GASTRIC CANCER (H): Primary | ICD-10-CM

## 2017-10-02 DIAGNOSIS — C15.5 CANCER OF DISTAL THIRD OF ESOPHAGUS (H): ICD-10-CM

## 2017-10-02 DIAGNOSIS — C15.5 CANCER OF DISTAL THIRD OF ESOPHAGUS (H): Primary | ICD-10-CM

## 2017-10-02 LAB
ALBUMIN SERPL-MCNC: 3.3 G/DL (ref 3.4–5)
ALP SERPL-CCNC: 175 U/L (ref 40–150)
ALT SERPL W P-5'-P-CCNC: 31 U/L (ref 0–70)
ANION GAP SERPL CALCULATED.3IONS-SCNC: 6 MMOL/L (ref 3–14)
AST SERPL W P-5'-P-CCNC: 24 U/L (ref 0–45)
BASOPHILS # BLD AUTO: 0 10E9/L (ref 0–0.2)
BASOPHILS NFR BLD AUTO: 0.5 %
BILIRUB SERPL-MCNC: 0.7 MG/DL (ref 0.2–1.3)
BUN SERPL-MCNC: 15 MG/DL (ref 7–30)
CALCIUM SERPL-MCNC: 8.7 MG/DL (ref 8.5–10.1)
CHLORIDE SERPL-SCNC: 111 MMOL/L (ref 94–109)
CO2 SERPL-SCNC: 24 MMOL/L (ref 20–32)
CREAT SERPL-MCNC: 0.94 MG/DL (ref 0.66–1.25)
DIFFERENTIAL METHOD BLD: NORMAL
EOSINOPHIL # BLD AUTO: 0.1 10E9/L (ref 0–0.7)
EOSINOPHIL NFR BLD AUTO: 1.1 %
ERYTHROCYTE [DISTWIDTH] IN BLOOD BY AUTOMATED COUNT: 12.6 % (ref 10–15)
GFR SERPL CREATININE-BSD FRML MDRD: 83 ML/MIN/1.7M2
GLUCOSE SERPL-MCNC: 104 MG/DL (ref 70–99)
HCT VFR BLD AUTO: 41.5 % (ref 40–53)
HGB BLD-MCNC: 14 G/DL (ref 13.3–17.7)
IMM GRANULOCYTES # BLD: 0 10E9/L (ref 0–0.4)
IMM GRANULOCYTES NFR BLD: 0.2 %
LYMPHOCYTES # BLD AUTO: 1.5 10E9/L (ref 0.8–5.3)
LYMPHOCYTES NFR BLD AUTO: 34.2 %
MCH RBC QN AUTO: 31.5 PG (ref 26.5–33)
MCHC RBC AUTO-ENTMCNC: 33.7 G/DL (ref 31.5–36.5)
MCV RBC AUTO: 93 FL (ref 78–100)
MONOCYTES # BLD AUTO: 0.4 10E9/L (ref 0–1.3)
MONOCYTES NFR BLD AUTO: 8.7 %
NEUTROPHILS # BLD AUTO: 2.4 10E9/L (ref 1.6–8.3)
NEUTROPHILS NFR BLD AUTO: 55.3 %
NRBC # BLD AUTO: 0 10*3/UL
NRBC BLD AUTO-RTO: 0 /100
PLATELET # BLD AUTO: 173 10E9/L (ref 150–450)
POTASSIUM SERPL-SCNC: 3.8 MMOL/L (ref 3.4–5.3)
PROT SERPL-MCNC: 7.6 G/DL (ref 6.8–8.8)
RBC # BLD AUTO: 4.45 10E12/L (ref 4.4–5.9)
SODIUM SERPL-SCNC: 141 MMOL/L (ref 133–144)
WBC # BLD AUTO: 4.4 10E9/L (ref 4–11)

## 2017-10-02 PROCEDURE — 25000128 H RX IP 250 OP 636: Mod: ZF | Performed by: INTERNAL MEDICINE

## 2017-10-02 PROCEDURE — 96413 CHEMO IV INFUSION 1 HR: CPT

## 2017-10-02 PROCEDURE — 90686 IIV4 VACC NO PRSV 0.5 ML IM: CPT | Mod: ZF | Performed by: INTERNAL MEDICINE

## 2017-10-02 PROCEDURE — 25000128 H RX IP 250 OP 636: Mod: JW,ZF | Performed by: INTERNAL MEDICINE

## 2017-10-02 PROCEDURE — 96416 CHEMO PROLONG INFUSE W/PUMP: CPT

## 2017-10-02 PROCEDURE — 80053 COMPREHEN METABOLIC PANEL: CPT | Performed by: INTERNAL MEDICINE

## 2017-10-02 PROCEDURE — 96368 THER/DIAG CONCURRENT INF: CPT

## 2017-10-02 PROCEDURE — 99212 OFFICE O/P EST SF 10 MIN: CPT | Mod: ZF

## 2017-10-02 PROCEDURE — 85025 COMPLETE CBC W/AUTO DIFF WBC: CPT | Performed by: INTERNAL MEDICINE

## 2017-10-02 PROCEDURE — 96411 CHEMO IV PUSH ADDL DRUG: CPT

## 2017-10-02 PROCEDURE — G0008 ADMIN INFLUENZA VIRUS VAC: HCPCS

## 2017-10-02 PROCEDURE — 96367 TX/PROPH/DG ADDL SEQ IV INF: CPT

## 2017-10-02 PROCEDURE — 96415 CHEMO IV INFUSION ADDL HR: CPT

## 2017-10-02 PROCEDURE — 96375 TX/PRO/DX INJ NEW DRUG ADDON: CPT

## 2017-10-02 PROCEDURE — 99214 OFFICE O/P EST MOD 30 MIN: CPT | Mod: ZP | Performed by: INTERNAL MEDICINE

## 2017-10-02 RX ORDER — DEXAMETHASONE 4 MG/1
TABLET ORAL
Qty: 6 TABLET | Refills: 1 | Status: SHIPPED | OUTPATIENT
Start: 2017-10-02 | End: 2017-10-30

## 2017-10-02 RX ORDER — PALONOSETRON 0.05 MG/ML
0.25 INJECTION, SOLUTION INTRAVENOUS ONCE
Status: CANCELLED | OUTPATIENT
Start: 2017-10-02

## 2017-10-02 RX ORDER — FLUOROURACIL 50 MG/ML
400 INJECTION, SOLUTION INTRAVENOUS ONCE
Status: COMPLETED | OUTPATIENT
Start: 2017-10-02 | End: 2017-10-02

## 2017-10-02 RX ORDER — MEPERIDINE HYDROCHLORIDE 25 MG/ML
25 INJECTION INTRAMUSCULAR; INTRAVENOUS; SUBCUTANEOUS EVERY 30 MIN PRN
Status: CANCELLED | OUTPATIENT
Start: 2017-10-02

## 2017-10-02 RX ORDER — LORAZEPAM 2 MG/ML
0.5 INJECTION INTRAMUSCULAR EVERY 4 HOURS PRN
Status: CANCELLED
Start: 2017-10-02

## 2017-10-02 RX ORDER — ZOLPIDEM TARTRATE 10 MG/1
10 TABLET ORAL
Qty: 60 TABLET | Refills: 1 | Status: SHIPPED | OUTPATIENT
Start: 2017-10-02 | End: 2018-02-05

## 2017-10-02 RX ORDER — FLUOROURACIL 50 MG/ML
400 INJECTION, SOLUTION INTRAVENOUS ONCE
Status: CANCELLED | OUTPATIENT
Start: 2017-10-02

## 2017-10-02 RX ORDER — EPINEPHRINE 0.3 MG/.3ML
INJECTION SUBCUTANEOUS
Status: DISCONTINUED
Start: 2017-10-02 | End: 2017-10-02 | Stop reason: WASHOUT

## 2017-10-02 RX ORDER — METHYLPREDNISOLONE SODIUM SUCCINATE 125 MG/2ML
125 INJECTION, POWDER, LYOPHILIZED, FOR SOLUTION INTRAMUSCULAR; INTRAVENOUS
Status: CANCELLED
Start: 2017-10-02

## 2017-10-02 RX ORDER — EPINEPHRINE 1 MG/ML
0.3 INJECTION INTRAMUSCULAR; INTRAVENOUS; SUBCUTANEOUS EVERY 5 MIN PRN
Status: CANCELLED | OUTPATIENT
Start: 2017-10-02

## 2017-10-02 RX ORDER — DIPHENHYDRAMINE HYDROCHLORIDE 50 MG/ML
50 INJECTION INTRAMUSCULAR; INTRAVENOUS
Status: CANCELLED
Start: 2017-10-02

## 2017-10-02 RX ORDER — SODIUM CHLORIDE 9 MG/ML
1000 INJECTION, SOLUTION INTRAVENOUS CONTINUOUS PRN
Status: CANCELLED
Start: 2017-10-02

## 2017-10-02 RX ORDER — ALBUTEROL SULFATE 90 UG/1
1-2 AEROSOL, METERED RESPIRATORY (INHALATION)
Status: CANCELLED
Start: 2017-10-02

## 2017-10-02 RX ORDER — CYCLOBENZAPRINE HCL 10 MG
10 TABLET ORAL
COMMUNITY
Start: 2017-09-19 | End: 2017-10-19

## 2017-10-02 RX ORDER — PALONOSETRON 0.05 MG/ML
0.25 INJECTION, SOLUTION INTRAVENOUS ONCE
Status: COMPLETED | OUTPATIENT
Start: 2017-10-02 | End: 2017-10-02

## 2017-10-02 RX ORDER — EPINEPHRINE 0.3 MG/.3ML
0.3 INJECTION SUBCUTANEOUS EVERY 5 MIN PRN
Status: CANCELLED | OUTPATIENT
Start: 2017-10-02

## 2017-10-02 RX ORDER — ALBUTEROL SULFATE 0.83 MG/ML
2.5 SOLUTION RESPIRATORY (INHALATION)
Status: CANCELLED | OUTPATIENT
Start: 2017-10-02

## 2017-10-02 RX ORDER — HEPARIN SODIUM (PORCINE) LOCK FLUSH IV SOLN 100 UNIT/ML 100 UNIT/ML
5 SOLUTION INTRAVENOUS EVERY 8 HOURS PRN
Status: DISCONTINUED | OUTPATIENT
Start: 2017-10-02 | End: 2017-10-03 | Stop reason: HOSPADM

## 2017-10-02 RX ADMIN — OXALIPLATIN 173 MG: 5 INJECTION, SOLUTION, CONCENTRATE INTRAVENOUS at 14:11

## 2017-10-02 RX ADMIN — SODIUM CHLORIDE 150 MG: 9 INJECTION, SOLUTION INTRAVENOUS at 13:43

## 2017-10-02 RX ADMIN — FLUOROURACIL 1220 MG: 50 INJECTION, SOLUTION INTRAVENOUS at 16:16

## 2017-10-02 RX ADMIN — PALONOSETRON HYDROCHLORIDE 0.25 MG: 0.25 INJECTION INTRAVENOUS at 13:21

## 2017-10-02 RX ADMIN — SODIUM CHLORIDE, PRESERVATIVE FREE 5 ML: 5 INJECTION INTRAVENOUS at 11:28

## 2017-10-02 RX ADMIN — INFLUENZA A VIRUS A/MICHIGAN/45/2015 X-275 (H1N1) ANTIGEN (FORMALDEHYDE INACTIVATED), INFLUENZA A VIRUS A/HONG KONG/4801/2014 X-263B (H3N2) ANTIGEN (FORMALDEHYDE INACTIVATED), INFLUENZA B VIRUS B/PHUKET/3073/2013 ANTIGEN (FORMALDEHYDE INACTIVATED), AND INFLUENZA B VIRUS B/BRISBANE/60/2008 ANTIGEN (FORMALDEHYDE INACTIVATED) 0.5 ML: 15; 15; 15; 15 INJECTION, SUSPENSION INTRAMUSCULAR at 15:20

## 2017-10-02 RX ADMIN — LEUCOVORIN CALCIUM 1050 MG: 500 INJECTION, POWDER, LYOPHILIZED, FOR SOLUTION INTRAMUSCULAR; INTRAVENOUS at 14:09

## 2017-10-02 RX ADMIN — DEXTROSE MONOHYDRATE 500 ML: 50 INJECTION, SOLUTION INTRAVENOUS at 13:21

## 2017-10-02 RX ADMIN — DEXAMETHASONE SODIUM PHOSPHATE: 10 INJECTION, SOLUTION INTRAMUSCULAR; INTRAVENOUS at 13:22

## 2017-10-02 ASSESSMENT — PAIN SCALES - GENERAL: PAINLEVEL: MODERATE PAIN (4)

## 2017-10-02 NOTE — MR AVS SNAPSHOT
After Visit Summary   10/2/2017    Reuben Padilla    MRN: [id number]           Patient Information     Date Of Birth          1960        Visit Information        Provider Department      10/2/2017 12:30 PM UC 21 ATC; UC ONCOLOGY INFUSION [facility]        Today's Diagnoses     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)    -  1       Follow-ups after your visit        Your next 10 appointments already scheduled     Oct 17, 2017 10:15 AM CDT   [facility] Lab Draw with [facility] LAB DRAW   [facility] ([facility])    [address] [place] [address]  [address] [facility] [address]   [phone]            Oct 17, 2017 11:00 AM CDT   (Arrive by 10:45 AM)   Return Visit with Katalina Ramirez PA-C   [facility] [place] ([facility])    [address] [place] [address]   [phone]            Oct 17, 2017 11:30 AM CDT   Infusion 240 with UC ONCOLOGY INFUSION, UC 21 ATC   [facility] [place] ([facility])    [address] [place] [address]   [phone]            Oct 30, 2017 10:15 AM CDT   [facility] Lab Draw with UC [facility] LAB DRAW   [facility] ([facility])    [address] [place] [address]   [phone]            Oct 30, 2017 10:40 AM CDT   (Arrive by 10:25 AM)   CT CHEST/ABDOMEN/PELVIS W CONTRAST with UCCT2   [facility] ([facility])    [address] [place] [address]  [address] [facility] [address]   [phone]           Please bring any scans or X-rays taken at other hospitals, if similar tests were done. Also bring a list of your medicines, including vitamins, minerals and over-the-counter drugs. It is safest to leave personal items at home.  Be sure to tell your doctor:   If you have any allergies.   If there s  any chance you are pregnant.   If you are breastfeeding.   If you have any special needs.  You may have contrast for this exam. To prepare:   Do not eat or drink for 2 hours before your exam. If you need to take medicine, you may take it with small sips of water. (We may ask you to take liquid medicine as well.)   The day before your exam, drink extra fluids at least six 8-ounce glasses (unless your doctor tells you to restrict your fluids).  Patients over 70 or patients with diabetes or kidney problems:   If you haven t had a blood test (creatinine test) within the last 30 days, go to your clinic or Diagnostic Imaging Department for this test.  If you have diabetes:   If your kidney function is normal, continue taking your metformin (Avandamet, Glucophage, Glucovance, Metaglip) on the day of your exam.   If your kidney function is abnormal, wait 48 hours before restarting this medicine.  You will have oral contrast for this exam:   You will drink the contrast at home. Get this from your clinic or Diagnostic Imaging Department. Please follow the directions given.  Please wear loose clothing, such as a sweat suit or jogging clothes. Avoid snaps, zippers and other metal. We may ask you to undress and put on a hospital gown.  If you have any questions, please call the Imaging Department where you will have your exam.            Oct 30, 2017  1:00 PM CDT   (Arrive by 12:45 PM)   Return Visit with Kadi Dee MD   [facility])    [address] [place] [address]  2nd [facility] [address]   [phone]            Oct 30, 2017  1:30 PM CDT   Infusion 240 with UC ONCOLOGY INFUSION, UC 32 ATC   [facility] ([facility])    [address]  2nd Floor  [facility] [address]   [phone]            Nov 13, 2017  3:20 PM CST   (Arrive by 3:05 PM)   RETURN ATRIAL FIBULATION VISIT with Romero Guerrero MD     [facility] ([facility])    [address]  3rd Floor  [facility] [address] [phone]              Who to contact     If you have questions or need follow up information about today's clinic visit or your schedule please contact [facility] directly at [phone].  Normal or non-critical lab and imaging results will be communicated to you by MyChart, letter or phone within 4 business days after the clinic has received the results. If you do not hear from us within 7 days, please contact the clinic through [employer] or phone. If you have a critical or abnormal lab result, we will notify you by phone as soon as possible.  Submit refill requests through [employer] or call your pharmacy and they will forward the refill request to us. Please allow 3 business days for your refill to be completed.          Additional Information About Your Visit        [employer] Information     [employer] gives you secure access to your electronic health record. If you see a primary care provider, you can also send messages to your care team and make appointments. If you have questions, please call your primary care clinic.  If you do not have a primary care provider, please call [phone] and they will assist you.        Care EveryWhere ID     This is your Care EveryWhere ID. This could be used by other organizations to access your [facility] medical records  [id number]         Blood Pressure from Last 3 Encounters:   10/02/17 (!) 128/92   09/05/17 107/72   08/22/17 122/77    Weight from Last 3 Encounters:   10/02/17 (!) 160.1 kg (353 lb)   09/05/17 (!) 166.1 kg (366 lb 1.6 oz)   08/22/17 (!) 168 kg (370 lb 4.8 oz)              We Performed the Following     CBC with platelets differential     Comprehensive metabolic panel          Today's Medication Changes          These changes are accurate as of: 10/2/17  4:43 PM.  If you have any questions, ask your nurse or doctor.                These medicines have changed or have updated prescriptions.        Dose/Directions    * dexamethasone 4 MG tablet   Commonly known as:  DECADRON   This may have changed:  Another medication with the same name was added. Make sure you understand how and when to take each.   Used for:  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)        Take two tablets daily on days 2,3 and then 1 tablet daily days 4,5   Quantity:  6 tablet   Refills:  1       * dexamethasone 4 MG tablet   Commonly known as:  DECADRON   This may have changed:  You were already taking a medication with the same name, and this prescription was added. Make sure you understand how and when to take each.   Used for:  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)        Take two tablets daily on days 2,3 and then 1 tablet daily days 4,5   Quantity:  6 tablet   Refills:  1       * zolpidem 12.5 MG CR tablet   Commonly known as:  AMBIEN CR   This may have changed:  Another medication with the same name was changed. Make sure you understand how and when to take each.   Used for:  Insomnia, unspecified type   Changed by:  Katalina Ramirez PA-C        Dose:  12.5 mg   Take 1 tablet (12.5 mg) by mouth nightly as needed for sleep   Quantity:  30 tablet   Refills:  1       * zolpidem 6.25 MG CR tablet   Commonly known as:  AMBIEN CR   This may have changed:  Another medication with the same name was changed. Make sure you understand how and when to take each.   Used for:  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)   Changed by:  Kadi Dee MD        Dose:  6.25 mg   Take 1 tablet (6.25 mg) by mouth nightly as needed for sleep   Quantity:  60 tablet   Refills:  0       * zolpidem 10 MG tablet   Commonly known as:  AMBIEN   This may have changed:    - medication strength  - when to take this  - reasons to take this   Used for:  Metastasis from gastric cancer (H)   Changed by:  Kadi Dee MD        Dose:  10 mg   Take 1 tablet (10 mg) by mouth  nightly as needed   Quantity:  60 tablet   Refills:  1       * Notice:  This list has 5 medication(s) that are the same as other medications prescribed for you. Read the directions carefully, and ask your doctor or other care provider to review them with you.         Where to get your medicines      These medications were sent to [facility] [place], [place] - [address]  [address], [facility] [address]    Hours:  TRANSPLANT PHONE NUMBER [phone] Phone:  [phone]     dexamethasone 4 MG tablet         Some of these will need a paper prescription and others can be bought over the counter.  Ask your nurse if you have questions.     Bring a paper prescription for each of these medications     zolpidem 10 MG tablet                Primary Care Provider    None Specified       No primary provider on file.        Equal Access to Services     OLLIE SHARPE : Eliza Dunlap, yeyo davis, nancy kaalfay sotelo, geraldo cardenas. So [facility] [phone].    ATENCIÓN: Si habla español, tiene a alamo disposición servicios gratuitos de asistencia lingüística. Llame al [phone].    We comply with applicable federal civil rights laws and [place] laws. We do not discriminate on the basis of race, color, national origin, age, disability, sex, sexual orientation, or gender identity.            Thank you!     Thank you for choosing [facility]  for your care. Our goal is always to provide you with excellent care. Hearing back from our patients is one way we can continue to improve our services. Please take a few minutes to complete the written survey that you may receive in the mail after your visit with us. Thank you!             Your Updated Medication List - Protect others around you: Learn how to safely use, store and throw away your medicines at www.disposemymeds.org.          This list is accurate as  of: 10/2/17  4:43 PM.  Always use your most recent med list.                   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)       * dexamethasone 4 MG tablet    DECADRON    6 tablet    Take two tablets daily on days 2,3 and then 1 tablet daily days 4,5    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)       fish oil-omega-3 fatty acids 1000 MG capsule      Take 2 g by mouth daily        IBUPROFEN PO      Take 800 mg by mouth every 8 hours as needed for moderate pain        latanoprost 0.005 % ophthalmic solution    XALATAN     Place into both eyes At Bedtime    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)       lidocaine-prilocaine cream    EMLA    30 g    Apply topically as needed for moderate pain    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)       LORazepam 0.5 MG tablet    ATIVAN    60 tablet    Take 1 tablet (0.5 mg) by mouth every 6 hours as needed for anxiety    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), 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       Multi-vitamin Tabs tablet      Take 1 tablet by mouth daily        ondansetron 8 MG tablet    ZOFRAN    30 tablet    Take 1 tablet (8 mg) by mouth every 8 hours as needed (Nausea/Vomiting)    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)       prochlorperazine 10 MG tablet    COMPAZINE    30 tablet    Take 1 tablet (10 mg) by mouth every 6 hours as needed (Nausea/Vomiting)    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)       rivaroxaban ANTICOAGULANT 20 MG Tabs tablet    XARELTO    90 tablet    Take 1 tablet (20 mg) by mouth daily (with dinner)    Persistent atrial fibrillation (H), Other pulmonary embolism without acute cor pulmonale (H), 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)       timolol  0.5 % ophthalmic solution    TIMOPTIC     Place into both eyes daily    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)       * zolpidem 12.5 MG CR tablet    AMBIEN CR    30 tablet    Take 1 tablet (12.5 mg) by mouth nightly as needed for sleep    Insomnia, unspecified type       * zolpidem 6.25 MG CR tablet    AMBIEN CR    60 tablet    Take 1 tablet (6.25 mg) by mouth nightly as needed for sleep    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)       * zolpidem 10 MG tablet    AMBIEN    60 tablet    Take 1 tablet (10 mg) by mouth nightly as needed    Metastasis from gastric cancer (H)       * Notice:  This list has 5 medication(s) that are the same as other medications prescribed for you. Read the directions carefully, and ask your doctor or other care provider to review them with you.

## 2017-10-02 NOTE — LETTER
10/2/2017       RE: Reuben Padilla  PO   [facility] [address]     Dear Colleague,    Thank you for referring your patient, Reuben Padilla, to the [facility]. Please see a copy of my visit note below.    Oncology Visit:  October 3, 2017       HISTORY OF PRESENT ILLNESS:  The patient reports that initially he noticed symptoms of dysphagia, mainly to solid food, starting in February of 2017.  Symptoms got progressively worse.  Currently, he is unable to eat solid food, and he needs to take a lot of fluid to push food down.  His dysphagia symptoms prompted subsequent evaluation with upper endoscopy and colonoscopy that was done in [place]. The colonoscopy revealed two tubular adenomas in the colon.  The upper endoscopy revealed an ulcerated mass 1-2 cm long, approximately 40 cm from the lips.  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  The patient also had a PET/CT scan done that revealed widespread lavon metastasis and multiple liver mets that were consistent with metastatic disease. Her 2 negative.        See Interval History Below:       His past medical history is significant only for isolated intermittent atrial fibrillation for which he is intermittently taking a baby aspirin.  He denies any other significant past medical history     SOCIAL HISTORY:  He has a remote history of smoking, about 20-pack years; he quit 10 years ago.  He denies alcohol or illicit drugs.  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  He has a sister who lives in the city, and he has a 22-year-old son.           Past Medical/Surgical History:  Past Medical History:   Diagnosis Date     Atrial fibrillation (H)      Cancer (H)     esophageal     Glaucoma      Glaucoma      Past Surgical History:   Procedure Laterality Date     AMPUTATION      toe     ARTHROSCOPY KNEE       bunion surgery       CHOLECYSTECTOMY       INSERT PORT VASCULAR  ACCESS Right 5/9/2017    Procedure: INSERT PORT VASCULAR ACCESS;  Single Lumen Chest Power Port;  Surgeon: Jasiel Hu PA-C;  Location: [facility] OR   Allergies:  Allergies as of 10/02/2017 - David as Reviewed 10/02/2017   Allergen Reaction Noted     Penicillin g Other (See Comments) 01/19/2017     Penicillins Unknown 04/27/2017     Current Medications:  Current Outpatient Prescriptions   Medication Sig Dispense Refill     cyclobenzaprine (FLEXERIL) 10 MG tablet Take 10 mg by mouth       Zolpidem Tartrate (AMBIEN PO) Take 10 mg by mouth       dexamethasone (DECADRON) 4 MG tablet Take two tablets daily on days 2,3 and then 1 tablet daily days 4,5 6 tablet 1     rivaroxaban ANTICOAGULANT (XARELTO) 20 MG TABS tablet Take 1 tablet (20 mg) by mouth daily (with dinner) 90 tablet 3     zolpidem (AMBIEN CR) 6.25 MG CR tablet Take 1 tablet (6.25 mg) by mouth nightly as needed for sleep 60 tablet 0     lidocaine-prilocaine (EMLA) cream Apply topically as needed for moderate pain 30 g 3     multivitamin, therapeutic with minerals (MULTI-VITAMIN) TABS tablet Take 1 tablet by mouth daily       fish oil-omega-3 fatty acids 1000 MG capsule Take 2 g by mouth daily       IBUPROFEN PO Take 800 mg by mouth every 8 hours as needed for moderate pain       latanoprost (XALATAN) 0.005 % ophthalmic solution Place into both eyes At Bedtime       timolol (TIMOPTIC) 0.5 % ophthalmic solution Place into both eyes daily       zolpidem (AMBIEN CR) 12.5 MG CR tablet Take 1 tablet (12.5 mg) by mouth nightly as needed for sleep (Patient not taking: Reported on 9/5/2017) 30 tablet 1     ondansetron (ZOFRAN) 8 MG tablet Take 1 tablet (8 mg) by mouth every 8 hours as needed (Nausea/Vomiting) (Patient not taking: Reported on 10/2/2017) 30 tablet 2     prochlorperazine (COMPAZINE) 10 MG tablet Take 1 tablet (10 mg) by mouth every 6 hours as needed (Nausea/Vomiting) (Patient not taking: Reported on 10/2/2017) 30 tablet 2     LORazepam (ATIVAN) 0.5  "MG tablet Take 1 tablet (0.5 mg) by mouth every 6 hours as needed for anxiety (Patient not taking: Reported on 10/2/2017) 60 tablet 1      Family History:    Maternal grandmother had breast cancer diagnosed at her 50's.Paternal aunt had some gynecologic cancer    Social History:  Social History     Social History     Marital status: Single     Spouse name: N/A     Number of children: N/A     Years of education: N/A     Occupational History     Not on file.     Social History Main Topics     Smoking status: Former Smoker     Smokeless tobacco: Never Used     Alcohol use Yes      Comment: occasional     Drug use: No      Comment: h/o over 30 years ago     Sexual activity: Not on file     Other Topics Concern     Not on file     Social History Narrative     Physical Exam:  BP (!) 128/92 (BP Location: Right arm, Patient Position: Sitting, Cuff Size: Adult Regular)  Pulse 101  Temp 97.2  F (36.2  C) (Oral)  Resp 18  Ht 1.981 m (6' 5.99\")  Wt (!) 160.1 kg (353 lb)  SpO2 95%  BMI 40.8 kg/m2    GENERAL APPEARANCE: healthy, alert and no distress     HENT: Mouth without ulcers or lesions     NECK: no adenopathy, no asymmetry or masses     LYMPHATICS: No cervical, supraclavicular, axillary or inguinal lymphadenopathy appreciated     RESP: lungs clear to auscultation - no rales, rhonchi or wheezes     CARDIOVASCULAR: regular rates and rhythm, normal S1 S2, no S3 or S4 and no murmur.     ABDOMEN:  soft, nontender, no HSM or masses and bowel sounds normal     MUSCULOSKELETAL: No edema b/l LE.     SKIN: no suspicious lesions or rashes     PSYCHIATRIC: mentation appears normal and affect normal        Lab Results   Component Value Date    WBC 4.1 09/05/2017     Lab Results   Component Value Date    RBC 4.24 09/05/2017     Lab Results   Component Value Date    HGB 13.9 09/05/2017     Lab Results   Component Value Date    HCT 40.6 09/05/2017     No components found for: MCT  Lab Results   Component Value Date    MCV 96 " 09/05/2017     Lab Results   Component Value Date    MCH 32.8 09/05/2017     Lab Results   Component Value Date    MCHC 34.2 09/05/2017     Lab Results   Component Value Date    RDW 13.5 09/05/2017     Lab Results   Component Value Date     09/05/2017     Recent Labs   Lab Test  09/05/17   0817  08/22/17   0825   NA  140  144   POTASSIUM  3.9  3.8   CHLORIDE  109  113*   CO2  26  24   ANIONGAP  6  7   GLC  102*  123*   BUN  12  12   CR  0.76  0.72   NIDHI  8.6  8.4*     Liver Function Studies -   Recent Labs   Lab Test  09/05/17 0817   PROTTOTAL  7.5   ALBUMIN  3.1*   BILITOTAL  0.6   ALKPHOS  93   AST  27   ALT  26          ASSESSMENT/PLAN:  1.  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  This corresponds to stage IV cancer.      1.  I discussed with Levi that he has been tolerating FOLFOX well.  He has required a dose reduction in his oxaliplatin to two-thirds.  He has also had the addition of Decadron for 5 days post-treatment, which he says helped with his fatigue significantly.  We will go ahead and continue on the course of FOLFOX.  He is scheduled for restaging at the end of October.        I reviewed with him the recent approval of pembrolizumab for patients with PD-1 overexpression for metastatic gastric cancer after 2 lines of therapy.  We will go ahead and ask the lab to run these tests on his tumor sample.       2.  Nausea.  This is improved on Emend as well as Decadron on days 2 through 5.       3.  Atrial fibrillation.  He has intermittent a-fib.  He is currently asymptomatic from this and is in sinus rhythm.        4.  He had an incidental pulmonary embolism identified on imaging in 06/2017.  He remains on Xarelto.       5.  He has been asked to see Dr. Darrius Lacy for psychosocial adjustment.       6.  He has recovered from his fall and injury.  He continues to have  some soreness in his ribs.  I believe, however, he is well enough for treatment.  We will go ahead with chemotherapy today.                 Last signed by: Kadi Dee MD    [10/03/2017 12:30 PM]          My NoteSigned Yesterday           []Hide copied text  []Hover for attribution information  Reuben returns today for followup.  At his last visit in early September, he had a dose reduction of this FOLFOX as well as using adjuvant Decadron for 5 days after chemotherapy.  I subsequently received a call from [place] that he had an accident at work that required him to be unconscious with a head injury requiring staples and in the hospital for multiple days.  He had vertebral body fractures as well as multiple rib fractures.  He says he is now recovering.  His last course of chemotherapy 2 weeks ago was subsequently postponed.       He is recovering now and he is taking 2 Oxycodone in the morning.  He thinks overall that he is doing much better.  No issues with constipation.  No nausea.  He thinks his neuropathy is overall stable.  This is a grade 1 neuropathy up to approximately his ankles.       He is now on full time disability in [place].  He is in the process of moving to the [facility].       He has questions about his next course of therapy.       REVIEW OF SYSTEMS:  A 10-point review of systems is otherwise much resolved.                 Last signed by: Kadi Dee MD    [10/03/2017 12:30 PM]          My NoteIncomplete Yesterday   Bookmark    AcceptCancel          Reuben returns today for followup.  At his last visit in early September, he had a dose reduction of this FOLFOX as well as using adjuvant Decadron for 5 days after chemotherapy.  I subsequently received a call from [place] that he had an accident at work that required him to be unconscious with a head injury requiring staples and in the hospital for multiple days.  He had vertebral body fractures as well as multiple  rib fractures.  He says he is now recovering.  His last course of chemotherapy 2 weeks ago was subsequently postponed.      He is recovering now and he is taking 2 Oxycodone in the morning.  He thinks overall that he is doing much better.  No issues with constipation.  No nausea.  He thinks his neuropathy is overall stable.  This is a grade 1 neuropathy up to approximately his ankles.      He is now on full time disability in [place].  He is in the process of moving to the [facility].      He has questions about his next course of therapy.      REVIEW OF SYSTEMS:  A 10-point review of systems is otherwise much resolved.     1.  I discussed with Levi that he has been tolerating FOLFOX well.  He has required a dose reduction in his oxaliplatin to two-thirds.  He has also had the addition of Decadron for 5 days post-treatment, which he says helped with his fatigue significantly.  We will go ahead and continue on the course of FOLFOX.  He is scheduled for restaging at the end of October.       I reviewed with him the recent approval of pembrolizumab for patients with PD-1 overexpression for metastatic gastric cancer after 2 lines of therapy.  We will go ahead and ask the lab to run these tests on his tumor sample.      2.  Nausea.  This is improved on Emend as well as Decadron on days 2 through 5.      3.  Atrial fibrillation.  He has intermittent a-fib.  He is currently asymptomatic from this and is in sinus rhythm.       4.  He had an incidental pulmonary embolism identified on imaging in 06/2017.  He remains on Xarelto.      5.  He has been asked to see Dr. Darrius Lacy for psychosocial adjustment.      6.  He has recovered from his fall and injury.  He continues to have some soreness in his ribs.  I believe, however, he is well enough for treatment.  We will go ahead with chemotherapy today.      Again, thank you for allowing me to participate in the care of your patient.      Sincerely,    Kadi Dee,  MD

## 2017-10-02 NOTE — PROGRESS NOTES
Infusion Nursing Note:  Reuben Padilla presents today for Cycle 10 Day 1 Oxaliplatin, Leucovorin, Fluorouracil bolus, Fluorouracil pump connect. Flu vaccine given today.    Patient seen by provider today: Yes: Kadi Dee MD   present during visit today: Not Applicable.    Intravenous Access:  Implanted Port accessed by lab.    Treatment Conditions:  Lab Results   Component Value Date    HGB 14.0 10/02/2017     Lab Results   Component Value Date    WBC 4.4 10/02/2017      Lab Results   Component Value Date    ANEU 2.4 10/02/2017     Lab Results   Component Value Date     10/02/2017      Lab Results   Component Value Date     10/02/2017                   Lab Results   Component Value Date    POTASSIUM 3.8 10/02/2017           No results found for: MAG         Lab Results   Component Value Date    CR 0.94 10/02/2017                   Lab Results   Component Value Date    NIDHI 8.7 10/02/2017                Lab Results   Component Value Date    BILITOTAL 0.7 10/02/2017           Lab Results   Component Value Date    ALBUMIN 3.3 10/02/2017                    Lab Results   Component Value Date    ALT 31 10/02/2017           Lab Results   Component Value Date    AST 24 10/02/2017     Results reviewed, labs MET treatment parameters, ok to proceed with treatment.    Post Infusion Assessment:  Patient tolerated infusion without incident.  Blood return noted pre and post infusion and prior to pump connect.  Site patent and intact, free from redness, edema or discomfort.  Fluorouracil pump connected at 1618 set to infuse over the next 46 hours.  Pump connections open and double checked with Deann Mayorga RN.  [facility] aware to d/c pump at [facility] on 10/4 at 1430.      Discharge Plan:   Prescription refills given for Dexamethasone and zolpidem.  Discharge instructions reviewed with: Patient and Family.  Patient and/or family verbalized understanding of discharge instructions and all questions  answered.  Copy of AVS reviewed with patient and/or family.  Patient will return 10/17/2017 for next lab, provider and chemo appointment.  Patient discharged in stable condition accompanied by: self and sister.  Departure Mode: Ambulatory.    FRANCHESKA Mosher      1.  Has the patient received the information for the influenza vaccine? YES    2.  Does the patient have any of the following contraindications?     Allergy to eggs?  No     Allergic reaction to previous influenza vaccines? No     Any other problems to previous influenza vaccines? No     Paralyzed by Guillain-Itmann syndrome? No     Currently pregnant? NO     Current moderate or severe illness?  No     Allergy to contact lens solution?  No    3.  The vaccine has been administered in the usual fashion and the patient was instructed to wait 20 minutes before leaving the building in the event of an allergic reaction: YES    Vaccination given by Elizabeth Cochran RN.    Recorded by Elizabeth Cochran

## 2017-10-02 NOTE — NURSING NOTE
Chief Complaint   Patient presents with     Port Draw     Labs drawn via power port by RN. Line flushed and hep locked. VS taken.     Olga Ashley RN

## 2017-10-02 NOTE — MR AVS SNAPSHOT
After Visit Summary   10/2/2017    Reuben Padilla    MRN: [id number]           Patient Information     Date Of Birth          1960        Visit Information        Provider Department      10/2/2017 12:00 PM Kadi Dee MD 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 ([facility])    [address] [place] [address]   [phone]            Oct 17, 2017 11:30 AM CDT   Infusion 240 with UC ONCOLOGY INFUSION, UC 21 ATC   [facility] ([facility])    [address] [place] [address]   [phone]            Oct 30, 2017 10:15 AM CDT   [facility] Lab Draw with UC [facility] LAB DRAW   [facility] ([facility])    [address] [place] [phone]   [phone]            Oct 30, 2017 10:40 AM CDT   (Arrive by 10:25 AM)   CT CHEST/ABDOMEN/PELVIS W CONTRAST with UCCT2   [facility] ([facility])    [address] [place] [address]   [phone]           Please bring any scans or X-rays taken at other hospitals, if similar tests were done. Also bring a list of your medicines, including vitamins, minerals and over-the-counter drugs. It is safest to leave personal items at home.  Be sure to tell your doctor:   If you  have any allergies.   If there s any chance you are pregnant.   If you are breastfeeding.   If you have any special needs.  You may have contrast for this exam. To prepare:   Do not eat or drink for 2 hours before your exam. If you need to take medicine, you may take it with small sips of water. (We may ask you to take liquid medicine as well.)   The day before your exam, drink extra fluids at least six 8-ounce glasses (unless your doctor tells you to restrict your fluids).  Patients over 70 or patients with diabetes or kidney problems:   If you haven t had a blood test (creatinine test) within the last 30 days, go to your clinic or Diagnostic Imaging Department for this test.  If you have diabetes:   If your kidney function is normal, continue taking your metformin (Avandamet, Glucophage, Glucovance, Metaglip) on the day of your exam.   If your kidney function is abnormal, wait 48 hours before restarting this medicine.  You will have oral contrast for this exam:   You will drink the contrast at home. Get this from your clinic or Diagnostic Imaging Department. Please follow the directions given.  Please wear loose clothing, such as a sweat suit or jogging clothes. Avoid snaps, zippers and other metal. We may ask you to undress and put on a hospital gown.  If you have any questions, please call the Imaging Department where you will have your exam.            Oct 30, 2017  1:00 PM CDT   (Arrive by 12:45 PM)   Return Visit with Kadi Dee MD   [facility])    [address] [place] [address]  2nd Floor  [facility] [address]   [phone]            Oct 30, 2017  1:30 PM CDT   Infusion 240 with  ONCOLOGY INFUSION, UC 32 ATC   [facility] ([facility])    [address]  2nd Floor  [facility] [address]   [phone]            Nov 13, 2017  3:20 PM CST   (Arrive by 3:05 PM)   RETURN ATRIAL FIBULATION VISIT  "with Romero Guerrero MD   [facility] ([facility])    [address]  3rd [facility] [address] [phone]              Who to contact     If you have questions or need follow up information about today's clinic visit or your schedule please contact [facility] directly at [phone].  Normal or non-critical lab and imaging results will be communicated to you by MyChart, letter or phone within 4 business days after the clinic has received the results. If you do not hear from us within 7 days, please contact the clinic through [employer] or phone. If you have a critical or abnormal lab result, we will notify you by phone as soon as possible.  Submit refill requests through [employer] or call your pharmacy and they will forward the refill request to us. Please allow 3 business days for your refill to be completed.          Additional Information About Your Visit        [employer] Information     [employer] gives you secure access to your electronic health record. If you see a primary care provider, you can also send messages to your care team and make appointments. If you have questions, please call your primary care clinic.  If you do not have a primary care provider, please call [phone] and they will assist you.        Care EveryWhere ID     This is your Care EveryWhere ID. This could be used by other organizations to access your [place] medical records  [id number]        Your Vitals Were     Pulse Temperature Respirations Height Pulse Oximetry BMI (Body Mass Index)    101 97.2  F (36.2  C) (Oral) 18 1.981 m (6' 5.99\") 95% 40.8 kg/m2       Blood Pressure from Last 3 Encounters:   10/02/17 (!) 128/92   09/05/17 107/72   08/22/17 122/77    Weight from Last 3 Encounters:   10/02/17 (!) 160.1 kg (353 lb)   09/05/17 (!) 166.1 kg (366 lb 1.6 oz)   08/22/17 (!) 168 kg (370 lb 4.8 oz)              We Performed the Following     PD1 overexpression: " Laboratory Miscellaneous Order          Today's Medication Changes          These changes are accurate as of: 10/2/17 11:59 PM.  If you have any questions, ask your nurse or doctor.               These medicines have changed or have updated prescriptions.        Dose/Directions    * dexamethasone 4 MG tablet   Commonly known as:  DECADRON   This may have changed:  Another medication with the same name was added. Make sure you understand how and when to take each.   Used for:  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)        Take two tablets daily on days 2,3 and then 1 tablet daily days 4,5   Quantity:  6 tablet   Refills:  1       * dexamethasone 4 MG tablet   Commonly known as:  DECADRON   This may have changed:  You were already taking a medication with the same name, and this prescription was added. Make sure you understand how and when to take each.   Used for:  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)        Take two tablets daily on days 2,3 and then 1 tablet daily days 4,5   Quantity:  6 tablet   Refills:  1       * zolpidem 12.5 MG CR tablet   Commonly known as:  AMBIEN CR   This may have changed:  Another medication with the same name was changed. Make sure you understand how and when to take each.   Used for:  Insomnia, unspecified type   Changed by:  Katalina Ramirez PA-C        Dose:  12.5 mg   Take 1 tablet (12.5 mg) by mouth nightly as needed for sleep   Quantity:  30 tablet   Refills:  1       * zolpidem 6.25 MG CR tablet   Commonly known as:  AMBIEN CR   This may have changed:  Another medication with the same name was changed. Make sure you understand how and when to take each.   Used for:  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)   Changed by:  Kadi Dee MD        Dose:  6.25 mg   Take 1 tablet (6.25 mg) by mouth nightly as needed for sleep   Quantity:  60 tablet   Refills:  0       * zolpidem 10 MG tablet   Commonly known as:  AMBIEN   This may have changed:    - medication  strength  - when to take this  - reasons to take this   Used for:  Metastasis from gastric cancer (H)   Changed by:  Kadi Dee MD        Dose:  10 mg   Take 1 tablet (10 mg) by mouth nightly as needed   Quantity:  60 tablet   Refills:  1       * Notice:  This list has 5 medication(s) that are the same as other medications prescribed for you. Read the directions carefully, and ask your doctor or other care provider to review them with you.         Where to get your medicines      These medications were sent to [place], [place] - [address]  [address], [facility] [address]    Hours:  TRANSPLANT PHONE NUMBER [phone] Phone:  [phone]     dexamethasone 4 MG tablet         Some of these will need a paper prescription and others can be bought over the counter.  Ask your nurse if you have questions.     Bring a paper prescription for each of these medications     zolpidem 10 MG tablet                Primary Care Provider    None Specified       No primary provider on file.        Equal Access to Services     OLLIE SHARPE : Hadii antonio navarroo Sonigel, waaxda luqadaha, qaybta kaalmada adeegyajuancarlos, geraldo [place] . So [facility] [phone].    ATENCIÓN: Si habla español, tiene a alamo disposición servicios gratuitos de asistencia lingüística. Llame al [phone].    We comply with applicable federal civil rights laws and [place] laws. We do not discriminate on the basis of race, color, national origin, age, disability, sex, sexual orientation, or gender identity.            Thank you!     Thank you for choosing [facility]  for your care. Our goal is always to provide you with excellent care. Hearing back from our patients is one way we can continue to improve our services. Please take a few minutes to complete the written survey that you may receive in the mail after your visit with us. Thank  you!             Your Updated Medication List - Protect others around you: Learn how to safely use, store and throw away your medicines at www.disposemymeds.org.          This list is accurate as of: 10/2/17 11:59 PM.  Always use your most recent med list.                   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)       * dexamethasone 4 MG tablet    DECADRON    6 tablet    Take two tablets daily on days 2,3 and then 1 tablet daily days 4,5    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)       fish oil-omega-3 fatty acids 1000 MG capsule      Take 2 g by mouth daily        IBUPROFEN PO      Take 800 mg by mouth every 8 hours as needed for moderate pain        latanoprost 0.005 % ophthalmic solution    XALATAN     Place into both eyes At Bedtime    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)       lidocaine-prilocaine cream    EMLA    30 g    Apply topically as needed for moderate pain    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)       LORazepam 0.5 MG tablet    ATIVAN    60 tablet    Take 1 tablet (0.5 mg) by mouth every 6 hours as needed for anxiety    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), 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       Multi-vitamin Tabs tablet      Take 1 tablet by mouth daily        ondansetron 8 MG tablet    ZOFRAN    30 tablet    Take 1 tablet (8 mg) by mouth every 8 hours as needed (Nausea/Vomiting)    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)       prochlorperazine 10 MG tablet    COMPAZINE    30 tablet    Take 1 tablet (10 mg) by mouth every 6 hours as needed (Nausea/Vomiting)    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)       rivaroxaban ANTICOAGULANT 20 MG Tabs tablet    XARELTO    90 tablet    Take 1  tablet (20 mg) by mouth daily (with dinner)    Persistent atrial fibrillation (H), Other pulmonary embolism without acute cor pulmonale (H), 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)       timolol 0.5 % ophthalmic solution    TIMOPTIC     Place into both eyes daily    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)       * zolpidem 12.5 MG CR tablet    AMBIEN CR    30 tablet    Take 1 tablet (12.5 mg) by mouth nightly as needed for sleep    Insomnia, unspecified type       * zolpidem 6.25 MG CR tablet    AMBIEN CR    60 tablet    Take 1 tablet (6.25 mg) by mouth nightly as needed for sleep    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)       * zolpidem 10 MG tablet    AMBIEN    60 tablet    Take 1 tablet (10 mg) by mouth nightly as needed    Metastasis from gastric cancer (H)       * Notice:  This list has 5 medication(s) that are the same as other medications prescribed for you. Read the directions carefully, and ask your doctor or other care provider to review them with you.

## 2017-10-02 NOTE — NURSING NOTE
"Oncology Rooming Note    October 2, 2017 11:52 AM   Rebuen Padilla is a 52 year old male who presents for: Port Draw and Oncology Clinic Visit    Initial Vitals: BP (!) 128/92 (BP Location: Right arm, Patient Position: Sitting, Cuff Size: Adult Regular)  Pulse 101  Temp 97.2  F (36.2  C) (Oral)  Resp 18  Ht 1.981 m (6' 5.99\")  Wt (!) 160.1 kg (353 lb)  SpO2 95%  BMI 40.8 kg/m2 Estimated body mass index is 40.8 kg/(m^2) as calculated from the following:    Height as of this encounter: 1.981 m (6' 5.99\").    Weight as of this encounter: 160.1 kg (353 lb). Body surface area is 2.97 meters squared.  Moderate Pain (4) Comment: Data Unavailable   No LMP for male patient.  Allergies reviewed: Yes  Medications reviewed: Yes    Medications: MEDICATION REFILLS NEEDED TODAY. Provider was NOT notified.  Pharmacy name entered into Simmr:    [id number] - [place], [place] - [address] [place] [address] AT INTERSECTION 109[place], [place] - [address] [place] [address] [id number]    Clinical concerns:refills on Ambien and Dexamethasone.  Pain level of 4 in ribs I informed pt to remind the Provider/BERENICE about refills and pain level in case i dont touch bases with them, if the provider was in the exam room while i attend on rooming the next pt. Pt verbalized understandings.  Gianna Nolan CMA  Patient was offered a flu vaccine today but declined.      6 minutes for nursing intake (face to face time)     Gianna Nolan CMA                              "

## 2017-10-02 NOTE — PROGRESS NOTES
Oncology Visit:  October 3, 2017       HISTORY OF PRESENT ILLNESS:  The patient reports that initially he noticed symptoms of dysphagia, mainly to solid food, starting in February of 2017.  Symptoms got progressively worse.  Currently, he is unable to eat solid food, and he needs to take a lot of fluid to push food down.  His dysphagia symptoms prompted subsequent evaluation with upper endoscopy and colonoscopy that was done in [place]. The colonoscopy revealed two tubular adenomas in the colon.  The upper endoscopy revealed an ulcerated mass 1-2 cm long, approximately 40 cm from the lips.  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  The patient also had a PET/CT scan done that revealed widespread lavon metastasis and multiple liver mets that were consistent with metastatic disease. Her 2 negative.        See Interval History Below:       His past medical history is significant only for isolated intermittent atrial fibrillation for which he is intermittently taking a baby aspirin.  He denies any other significant past medical history     SOCIAL HISTORY:  He has a remote history of smoking, about 20-pack years; he quit 10 years ago.  He denies alcohol or illicit drugs.  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  He has a sister who lives in the city, and he has a 22-year-old son.           Past Medical/Surgical History:  Past Medical History:   Diagnosis Date     Atrial fibrillation (H)      Cancer (H)     esophageal     Glaucoma      Glaucoma      Past Surgical History:   Procedure Laterality Date     AMPUTATION      toe     ARTHROSCOPY KNEE       bunion surgery       CHOLECYSTECTOMY       INSERT PORT VASCULAR ACCESS Right 5/9/2017    Procedure: INSERT PORT VASCULAR ACCESS;  Single Lumen Chest Power Port;  Surgeon: Jasiel Hu PA-C;  [facility]   Allergies:  Allergies as of 10/02/2017 - David as Reviewed 10/02/2017    Allergen Reaction Noted     Penicillin g Other (See Comments) 01/19/2017     Penicillins Unknown 04/27/2017     Current Medications:  Current Outpatient Prescriptions   Medication Sig Dispense Refill     cyclobenzaprine (FLEXERIL) 10 MG tablet Take 10 mg by mouth       Zolpidem Tartrate (AMBIEN PO) Take 10 mg by mouth       dexamethasone (DECADRON) 4 MG tablet Take two tablets daily on days 2,3 and then 1 tablet daily days 4,5 6 tablet 1     rivaroxaban ANTICOAGULANT (XARELTO) 20 MG TABS tablet Take 1 tablet (20 mg) by mouth daily (with dinner) 90 tablet 3     zolpidem (AMBIEN CR) 6.25 MG CR tablet Take 1 tablet (6.25 mg) by mouth nightly as needed for sleep 60 tablet 0     lidocaine-prilocaine (EMLA) cream Apply topically as needed for moderate pain 30 g 3     multivitamin, therapeutic with minerals (MULTI-VITAMIN) TABS tablet Take 1 tablet by mouth daily       fish oil-omega-3 fatty acids 1000 MG capsule Take 2 g by mouth daily       IBUPROFEN PO Take 800 mg by mouth every 8 hours as needed for moderate pain       latanoprost (XALATAN) 0.005 % ophthalmic solution Place into both eyes At Bedtime       timolol (TIMOPTIC) 0.5 % ophthalmic solution Place into both eyes daily       zolpidem (AMBIEN CR) 12.5 MG CR tablet Take 1 tablet (12.5 mg) by mouth nightly as needed for sleep (Patient not taking: Reported on 9/5/2017) 30 tablet 1     ondansetron (ZOFRAN) 8 MG tablet Take 1 tablet (8 mg) by mouth every 8 hours as needed (Nausea/Vomiting) (Patient not taking: Reported on 10/2/2017) 30 tablet 2     prochlorperazine (COMPAZINE) 10 MG tablet Take 1 tablet (10 mg) by mouth every 6 hours as needed (Nausea/Vomiting) (Patient not taking: Reported on 10/2/2017) 30 tablet 2     LORazepam (ATIVAN) 0.5 MG tablet Take 1 tablet (0.5 mg) by mouth every 6 hours as needed for anxiety (Patient not taking: Reported on 10/2/2017) 60 tablet 1      Family History:    Maternal grandmother had breast cancer diagnosed at her 50's.Paternal  "aunt had some gynecologic cancer    Social History:  Social History     Social History     Marital status: Single     Spouse name: N/A     Number of children: N/A     Years of education: N/A     Occupational History     Not on file.     Social History Main Topics     Smoking status: Former Smoker     Smokeless tobacco: Never Used     Alcohol use Yes      Comment: occasional     Drug use: No      Comment: h/o over 30 years ago     Sexual activity: Not on file     Other Topics Concern     Not on file     Social History Narrative     Physical Exam:  BP (!) 128/92 (BP Location: Right arm, Patient Position: Sitting, Cuff Size: Adult Regular)  Pulse 101  Temp 97.2  F (36.2  C) (Oral)  Resp 18  Ht 1.981 m (6' 5.99\")  Wt (!) 160.1 kg (353 lb)  SpO2 95%  BMI 40.8 kg/m2    GENERAL APPEARANCE: healthy, alert and no distress     HENT: Mouth without ulcers or lesions     NECK: no adenopathy, no asymmetry or masses     LYMPHATICS: No cervical, supraclavicular, axillary or inguinal lymphadenopathy appreciated     RESP: lungs clear to auscultation - no rales, rhonchi or wheezes     CARDIOVASCULAR: regular rates and rhythm, normal S1 S2, no S3 or S4 and no murmur.     ABDOMEN:  soft, nontender, no HSM or masses and bowel sounds normal     MUSCULOSKELETAL: No edema b/l LE.     SKIN: no suspicious lesions or rashes     PSYCHIATRIC: mentation appears normal and affect normal        Lab Results   Component Value Date    WBC 4.1 09/05/2017     Lab Results   Component Value Date    RBC 4.24 09/05/2017     Lab Results   Component Value Date    HGB 13.9 09/05/2017     Lab Results   Component Value Date    HCT 40.6 09/05/2017     No components found for: MCT  Lab Results   Component Value Date    MCV 96 09/05/2017     Lab Results   Component Value Date    MCH 32.8 09/05/2017     Lab Results   Component Value Date    MCHC 34.2 09/05/2017     Lab Results   Component Value Date    RDW 13.5 09/05/2017     Lab Results   Component Value " Date     09/05/2017     Recent Labs   Lab Test  09/05/17   0817  08/22/17   0825   NA  140  144   POTASSIUM  3.9  3.8   CHLORIDE  109  113*   CO2  26  24   ANIONGAP  6  7   GLC  102*  123*   BUN  12  12   CR  0.76  0.72   NIDHI  8.6  8.4*     Liver Function Studies -   Recent Labs   Lab Test  09/05/17   0817   PROTTOTAL  7.5   ALBUMIN  3.1*   BILITOTAL  0.6   ALKPHOS  93   AST  27   ALT  26          ASSESSMENT/PLAN:  1.  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  This corresponds to stage IV cancer.      1.  I discussed with Levi that he has been tolerating FOLFOX well.  He has required a dose reduction in his oxaliplatin to two-thirds.  He has also had the addition of Decadron for 5 days post-treatment, which he says helped with his fatigue significantly.  We will go ahead and continue on the course of FOLFOX.  He is scheduled for restaging at the end of October.        I reviewed with him the recent approval of pembrolizumab for patients with PD-1 overexpression for metastatic gastric cancer after 2 lines of therapy.  We will go ahead and ask the lab to run these tests on his tumor sample.       2.  Nausea.  This is improved on Emend as well as Decadron on days 2 through 5.       3.  Atrial fibrillation.  He has intermittent a-fib.  He is currently asymptomatic from this and is in sinus rhythm.        4.  He had an incidental pulmonary embolism identified on imaging in 06/2017.  He remains on Xarelto.       5.  He has been asked to see Dr. Darrius Lacy for psychosocial adjustment.       6.  He has recovered from his fall and injury.  He continues to have some soreness in his ribs.  I believe, however, he is well enough for treatment.  We will go ahead with chemotherapy today.                 Last signed by: Kadi Dee MD    [10/03/2017 12:30 PM]          My NoteSigned  Yesterday           []Hide copied text  []Hover for attribution information  Reuben returns today for followup.  At his last visit in early September, he had a dose reduction of this FOLFOX as well as using adjuvant Decadron for 5 days after chemotherapy.  I subsequently received a call from [place] that he had an accident at work that required him to be unconscious with a head injury requiring staples and in the hospital for multiple days.  He had vertebral body fractures as well as multiple rib fractures.  He says he is now recovering.  His last course of chemotherapy 2 weeks ago was subsequently postponed.       He is recovering now and he is taking 2 Oxycodone in the morning.  He thinks overall that he is doing much better.  No issues with constipation.  No nausea.  He thinks his neuropathy is overall stable.  This is a grade 1 neuropathy up to approximately his ankles.       He is now on full time disability in [place].  He is in the process of moving to the [facility].       He has questions about his next course of therapy.       REVIEW OF SYSTEMS:  A 10-point review of systems is otherwise much resolved.                 Last signed by: Kadi Dee MD    [10/03/2017 12:30 PM]          My NoteIncomplete Yesterday   Bookmark    AcceptCancel

## 2017-10-03 NOTE — PROGRESS NOTES
1.  I discussed with Levi that he has been tolerating FOLFOX well.  He has required a dose reduction in his oxaliplatin to two-thirds.  He has also had the addition of Decadron for 5 days post-treatment, which he says helped with his fatigue significantly.  We will go ahead and continue on the course of FOLFOX.  He is scheduled for restaging at the end of October.       I reviewed with him the recent approval of pembrolizumab for patients with PD-1 overexpression for metastatic gastric cancer after 2 lines of therapy.  We will go ahead and ask the lab to run these tests on his tumor sample.      2.  Nausea.  This is improved on Emend as well as Decadron on days 2 through 5.      3.  Atrial fibrillation.  He has intermittent a-fib.  He is currently asymptomatic from this and is in sinus rhythm.       4.  He had an incidental pulmonary embolism identified on imaging in 06/2017.  He remains on Xarelto.      5.  He has been asked to see Dr. Darrius Lacy for psychosocial adjustment.      6.  He has recovered from his fall and injury.  He continues to have some soreness in his ribs.  I believe, however, he is well enough for treatment.  We will go ahead with chemotherapy today.

## 2017-10-03 NOTE — PROGRESS NOTES
Reuben returns today for followup.  At his last visit in early September, he had a dose reduction of this FOLFOX as well as using adjuvant Decadron for 5 days after chemotherapy.  I subsequently received a call from [place] that he had an accident at work that required him to be unconscious with a head injury requiring staples and in the hospital for multiple days.  He had vertebral body fractures as well as multiple rib fractures.  He says he is now recovering.  His last course of chemotherapy 2 weeks ago was subsequently postponed.      He is recovering now and he is taking 2 Oxycodone in the morning.  He thinks overall that he is doing much better.  No issues with constipation.  No nausea.  He thinks his neuropathy is overall stable.  This is a grade 1 neuropathy up to approximately his ankles.      He is now on full time disability in [place].  He is in the process of moving to the [facility].      He has questions about his next course of therapy.      REVIEW OF SYSTEMS:  A 10-point review of systems is otherwise much resolved.

## 2017-10-11 ENCOUNTER — DOCUMENTATION ONLY (OUTPATIENT)
Dept: OTHER | Facility: CLINIC | Age: 57
End: 2017-10-11

## 2017-10-11 PROBLEM — Z71.89 ACP (ADVANCE CARE PLANNING): Chronic | Status: ACTIVE | Noted: 2017-10-11

## 2017-10-12 ENCOUNTER — CARE COORDINATION (OUTPATIENT)
Dept: ONCOLOGY | Facility: CLINIC | Age: 57
End: 2017-10-12

## 2017-10-12 DIAGNOSIS — C15.5 CANCER OF DISTAL THIRD OF ESOPHAGUS (H): Primary | ICD-10-CM

## 2017-10-12 ASSESSMENT — ENCOUNTER SYMPTOMS
TACHYCARDIA: 0
DIZZINESS: 1
ARTHRALGIAS: 0
MUSCLE WEAKNESS: 1
LOSS OF CONSCIOUSNESS: 0
BLOOD IN STOOL: 1
EXERCISE INTOLERANCE: 1
JAUNDICE: 0
LEG SWELLING: 1
WEAKNESS: 1
TREMORS: 0
DOUBLE VISION: 0
LEG PAIN: 0
HEARTBURN: 0
HYPOTENSION: 0
BOWEL INCONTINENCE: 0
CLAUDICATION: 0
NAUSEA: 0
HEADACHES: 0
SYNCOPE: 0
EYE PAIN: 0
NUMBNESS: 0
EYE REDNESS: 1
RECTAL PAIN: 0
SEIZURES: 0
MUSCLE CRAMPS: 0
DIARRHEA: 1
VOMITING: 0
ABDOMINAL PAIN: 0
EYE WATERING: 0
TINGLING: 0
MEMORY LOSS: 1
JOINT SWELLING: 0
SPEECH CHANGE: 0
HYPERTENSION: 0
STIFFNESS: 0
BLOATING: 0
NECK PAIN: 0
BACK PAIN: 0
RECTAL BLEEDING: 0
PANIC: 0
EYE IRRITATION: 1
ORTHOPNEA: 0
DEPRESSION: 0
SLEEP DISTURBANCES DUE TO BREATHING: 0
NERVOUS/ANXIOUS: 0
DECREASED CONCENTRATION: 1
INSOMNIA: 1
MYALGIAS: 1
PALPITATIONS: 0
CONSTIPATION: 1
DISTURBANCES IN COORDINATION: 1

## 2017-10-16 ASSESSMENT — ENCOUNTER SYMPTOMS
LIGHT-HEADEDNESS: 1
HYPERTENSION: 0
MUSCLE WEAKNESS: 1
EYE WATERING: 0
ORTHOPNEA: 0
CLAUDICATION: 0
DECREASED APPETITE: 0
EYE REDNESS: 1
ALTERED TEMPERATURE REGULATION: 1
NUMBNESS: 1
FATIGUE: 1
MYALGIAS: 1
WEIGHT LOSS: 1
CHILLS: 1
DISTURBANCES IN COORDINATION: 1
WEAKNESS: 1
JOINT SWELLING: 0
WEIGHT GAIN: 0
HEADACHES: 0
PALPITATIONS: 0
LOSS OF CONSCIOUSNESS: 0
SEIZURES: 0
POLYPHAGIA: 0
SYNCOPE: 0
POLYDIPSIA: 0
INCREASED ENERGY: 1
TACHYCARDIA: 0
ARTHRALGIAS: 1
TREMORS: 0
BACK PAIN: 1
HALLUCINATIONS: 0
STIFFNESS: 0
NECK PAIN: 0
NIGHT SWEATS: 0
HYPOTENSION: 0
MEMORY LOSS: 1
DIZZINESS: 1
FEVER: 0
EYE PAIN: 0
MUSCLE CRAMPS: 0
TINGLING: 0
SLEEP DISTURBANCES DUE TO BREATHING: 0
EXERCISE INTOLERANCE: 1
SPEECH CHANGE: 0
PARALYSIS: 0
LEG SWELLING: 1
LEG PAIN: 1
DOUBLE VISION: 0
EYE IRRITATION: 1

## 2017-10-17 ENCOUNTER — ONCOLOGY VISIT (OUTPATIENT)
Dept: ONCOLOGY | Facility: CLINIC | Age: 57
End: 2017-10-17
Attending: INTERNAL MEDICINE
Payer: COMMERCIAL

## 2017-10-17 ENCOUNTER — CARE COORDINATION (OUTPATIENT)
Dept: ONCOLOGY | Facility: CLINIC | Age: 57
End: 2017-10-17

## 2017-10-17 VITALS
WEIGHT: 315 LBS | HEART RATE: 95 BPM | BODY MASS INDEX: 36.45 KG/M2 | TEMPERATURE: 98.3 F | RESPIRATION RATE: 18 BRPM | SYSTOLIC BLOOD PRESSURE: 137 MMHG | OXYGEN SATURATION: 96 % | DIASTOLIC BLOOD PRESSURE: 95 MMHG | HEIGHT: 78 IN

## 2017-10-17 DIAGNOSIS — C15.5 CANCER OF DISTAL THIRD OF ESOPHAGUS (H): ICD-10-CM

## 2017-10-17 DIAGNOSIS — S22.49XS CLOSED FRACTURE OF MULTIPLE RIBS, UNSPECIFIED LATERALITY, SEQUELA: Primary | ICD-10-CM

## 2017-10-17 DIAGNOSIS — I48.19 PERSISTENT ATRIAL FIBRILLATION (H): ICD-10-CM

## 2017-10-17 DIAGNOSIS — C15.5 CANCER OF DISTAL THIRD OF ESOPHAGUS (H): Primary | ICD-10-CM

## 2017-10-17 DIAGNOSIS — I26.99 OTHER PULMONARY EMBOLISM WITHOUT ACUTE COR PULMONALE, UNSPECIFIED CHRONICITY (H): ICD-10-CM

## 2017-10-17 LAB
ALBUMIN SERPL-MCNC: 3.2 G/DL (ref 3.4–5)
ALP SERPL-CCNC: 129 U/L (ref 40–150)
ALT SERPL W P-5'-P-CCNC: 24 U/L (ref 0–70)
ANION GAP SERPL CALCULATED.3IONS-SCNC: 7 MMOL/L (ref 3–14)
AST SERPL W P-5'-P-CCNC: 19 U/L (ref 0–45)
BASOPHILS # BLD AUTO: 0 10E9/L (ref 0–0.2)
BASOPHILS NFR BLD AUTO: 0.5 %
BILIRUB SERPL-MCNC: 0.7 MG/DL (ref 0.2–1.3)
BUN SERPL-MCNC: 12 MG/DL (ref 7–30)
CALCIUM SERPL-MCNC: 8.6 MG/DL (ref 8.5–10.1)
CHLORIDE SERPL-SCNC: 111 MMOL/L (ref 94–109)
CO2 SERPL-SCNC: 24 MMOL/L (ref 20–32)
CREAT SERPL-MCNC: 0.87 MG/DL (ref 0.66–1.25)
DIFFERENTIAL METHOD BLD: ABNORMAL
EOSINOPHIL # BLD AUTO: 0 10E9/L (ref 0–0.7)
EOSINOPHIL NFR BLD AUTO: 0.9 %
ERYTHROCYTE [DISTWIDTH] IN BLOOD BY AUTOMATED COUNT: 13.2 % (ref 10–15)
GFR SERPL CREATININE-BSD FRML MDRD: >90 ML/MIN/1.7M2
GLUCOSE SERPL-MCNC: 110 MG/DL (ref 70–99)
HCT VFR BLD AUTO: 39.8 % (ref 40–53)
HGB BLD-MCNC: 13.3 G/DL (ref 13.3–17.7)
IMM GRANULOCYTES # BLD: 0 10E9/L (ref 0–0.4)
IMM GRANULOCYTES NFR BLD: 0.2 %
LYMPHOCYTES # BLD AUTO: 1.3 10E9/L (ref 0.8–5.3)
LYMPHOCYTES NFR BLD AUTO: 30.6 %
MCH RBC QN AUTO: 31.1 PG (ref 26.5–33)
MCHC RBC AUTO-ENTMCNC: 33.4 G/DL (ref 31.5–36.5)
MCV RBC AUTO: 93 FL (ref 78–100)
MONOCYTES # BLD AUTO: 0.3 10E9/L (ref 0–1.3)
MONOCYTES NFR BLD AUTO: 7.3 %
NEUTROPHILS # BLD AUTO: 2.6 10E9/L (ref 1.6–8.3)
NEUTROPHILS NFR BLD AUTO: 60.5 %
NRBC # BLD AUTO: 0 10*3/UL
NRBC BLD AUTO-RTO: 0 /100
PLATELET # BLD AUTO: 89 10E9/L (ref 150–450)
POTASSIUM SERPL-SCNC: 3.5 MMOL/L (ref 3.4–5.3)
PROT SERPL-MCNC: 7 G/DL (ref 6.8–8.8)
RBC # BLD AUTO: 4.27 10E12/L (ref 4.4–5.9)
SODIUM SERPL-SCNC: 142 MMOL/L (ref 133–144)
WBC # BLD AUTO: 4.2 10E9/L (ref 4–11)

## 2017-10-17 PROCEDURE — 96415 CHEMO IV INFUSION ADDL HR: CPT

## 2017-10-17 PROCEDURE — 85025 COMPLETE CBC W/AUTO DIFF WBC: CPT | Performed by: PHYSICIAN ASSISTANT

## 2017-10-17 PROCEDURE — 25000128 H RX IP 250 OP 636: Mod: JW,ZF | Performed by: PHYSICIAN ASSISTANT

## 2017-10-17 PROCEDURE — 99213 OFFICE O/P EST LOW 20 MIN: CPT | Mod: ZF

## 2017-10-17 PROCEDURE — 96413 CHEMO IV INFUSION 1 HR: CPT

## 2017-10-17 PROCEDURE — 96368 THER/DIAG CONCURRENT INF: CPT

## 2017-10-17 PROCEDURE — 99214 OFFICE O/P EST MOD 30 MIN: CPT | Mod: ZP | Performed by: PHYSICIAN ASSISTANT

## 2017-10-17 PROCEDURE — 25000128 H RX IP 250 OP 636: Mod: ZF | Performed by: PHYSICIAN ASSISTANT

## 2017-10-17 PROCEDURE — 96411 CHEMO IV PUSH ADDL DRUG: CPT

## 2017-10-17 PROCEDURE — 96367 TX/PROPH/DG ADDL SEQ IV INF: CPT

## 2017-10-17 PROCEDURE — 96416 CHEMO PROLONG INFUSE W/PUMP: CPT

## 2017-10-17 PROCEDURE — 80053 COMPREHEN METABOLIC PANEL: CPT | Performed by: PHYSICIAN ASSISTANT

## 2017-10-17 PROCEDURE — 96375 TX/PRO/DX INJ NEW DRUG ADDON: CPT

## 2017-10-17 RX ORDER — EPINEPHRINE 1 MG/ML
0.3 INJECTION, SOLUTION, CONCENTRATE INTRAVENOUS EVERY 5 MIN PRN
Status: CANCELLED | OUTPATIENT
Start: 2017-10-17

## 2017-10-17 RX ORDER — PALONOSETRON 0.05 MG/ML
0.25 INJECTION, SOLUTION INTRAVENOUS ONCE
Status: CANCELLED | OUTPATIENT
Start: 2017-10-17 | End: 2017-10-17

## 2017-10-17 RX ORDER — SODIUM CHLORIDE 9 MG/ML
1000 INJECTION, SOLUTION INTRAVENOUS CONTINUOUS PRN
Status: CANCELLED
Start: 2017-10-17

## 2017-10-17 RX ORDER — FLUOROURACIL 50 MG/ML
400 INJECTION, SOLUTION INTRAVENOUS ONCE
Status: CANCELLED | OUTPATIENT
Start: 2017-10-17

## 2017-10-17 RX ORDER — EPINEPHRINE 0.3 MG/.3ML
0.3 INJECTION SUBCUTANEOUS EVERY 5 MIN PRN
Status: CANCELLED | OUTPATIENT
Start: 2017-10-17

## 2017-10-17 RX ORDER — LORAZEPAM 2 MG/ML
0.5 INJECTION INTRAMUSCULAR EVERY 4 HOURS PRN
Status: CANCELLED
Start: 2017-10-17

## 2017-10-17 RX ORDER — METHYLPREDNISOLONE SODIUM SUCCINATE 125 MG/2ML
125 INJECTION, POWDER, LYOPHILIZED, FOR SOLUTION INTRAMUSCULAR; INTRAVENOUS
Status: CANCELLED
Start: 2017-10-17

## 2017-10-17 RX ORDER — HEPARIN SODIUM (PORCINE) LOCK FLUSH IV SOLN 100 UNIT/ML 100 UNIT/ML
5 SOLUTION INTRAVENOUS EVERY 8 HOURS PRN
Status: DISCONTINUED | OUTPATIENT
Start: 2017-10-17 | End: 2017-10-17 | Stop reason: HOSPADM

## 2017-10-17 RX ORDER — FLUOROURACIL 50 MG/ML
400 INJECTION, SOLUTION INTRAVENOUS ONCE
Status: COMPLETED | OUTPATIENT
Start: 2017-10-17 | End: 2017-10-17

## 2017-10-17 RX ORDER — MEPERIDINE HYDROCHLORIDE 25 MG/ML
25 INJECTION INTRAMUSCULAR; INTRAVENOUS; SUBCUTANEOUS EVERY 30 MIN PRN
Status: CANCELLED | OUTPATIENT
Start: 2017-10-17

## 2017-10-17 RX ORDER — LIDOCAINE 50 MG/G
PATCH TOPICAL
Qty: 30 PATCH | Refills: 0 | Status: SHIPPED | OUTPATIENT
Start: 2017-10-17 | End: 2017-11-28

## 2017-10-17 RX ORDER — DEXAMETHASONE 4 MG/1
TABLET ORAL
Qty: 6 TABLET | Refills: 1 | Status: SHIPPED | OUTPATIENT
Start: 2017-10-17 | End: 2017-10-30

## 2017-10-17 RX ORDER — DEXAMETHASONE 4 MG/1
TABLET ORAL
Qty: 6 TABLET | Refills: 1 | Status: SHIPPED | OUTPATIENT
Start: 2017-10-17 | End: 2017-11-28

## 2017-10-17 RX ORDER — ALBUTEROL SULFATE 0.83 MG/ML
2.5 SOLUTION RESPIRATORY (INHALATION)
Status: CANCELLED | OUTPATIENT
Start: 2017-10-17

## 2017-10-17 RX ORDER — PALONOSETRON 0.05 MG/ML
0.25 INJECTION, SOLUTION INTRAVENOUS ONCE
Status: COMPLETED | OUTPATIENT
Start: 2017-10-17 | End: 2017-10-17

## 2017-10-17 RX ORDER — ALBUTEROL SULFATE 90 UG/1
1-2 AEROSOL, METERED RESPIRATORY (INHALATION)
Status: CANCELLED
Start: 2017-10-17

## 2017-10-17 RX ORDER — DIPHENHYDRAMINE HYDROCHLORIDE 50 MG/ML
50 INJECTION INTRAMUSCULAR; INTRAVENOUS
Status: CANCELLED
Start: 2017-10-17

## 2017-10-17 RX ADMIN — SODIUM CHLORIDE 150 MG: 900 INJECTION, SOLUTION INTRAVENOUS at 12:38

## 2017-10-17 RX ADMIN — SODIUM CHLORIDE, PRESERVATIVE FREE 5 ML: 5 INJECTION INTRAVENOUS at 10:29

## 2017-10-17 RX ADMIN — DEXAMETHASONE SODIUM PHOSPHATE: 10 INJECTION, SOLUTION INTRAMUSCULAR; INTRAVENOUS at 12:26

## 2017-10-17 RX ADMIN — LEUCOVORIN CALCIUM 1050 MG: 350 INJECTION, POWDER, LYOPHILIZED, FOR SOLUTION INTRAMUSCULAR; INTRAVENOUS at 13:06

## 2017-10-17 RX ADMIN — PALONOSETRON HYDROCHLORIDE 0.25 MG: 0.25 INJECTION INTRAVENOUS at 12:25

## 2017-10-17 RX ADMIN — FLUOROURACIL 1220 MG: 50 INJECTION, SOLUTION INTRAVENOUS at 15:02

## 2017-10-17 RX ADMIN — DEXTROSE 250 ML: 5 SOLUTION INTRAVENOUS at 12:24

## 2017-10-17 RX ADMIN — OXALIPLATIN 173 MG: 5 INJECTION, SOLUTION, CONCENTRATE INTRAVENOUS at 13:04

## 2017-10-17 ASSESSMENT — PAIN SCALES - GENERAL: PAINLEVEL: NO PAIN (0)

## 2017-10-17 NOTE — PROGRESS NOTES
Infusion Nursing Note:  Reuben Padilla presents today for Cycle 11, day 1 Oxaliplatin, Leucovorin, Fluorouracil bolus and fluorouracil pump connection.    Patient seen by provider today: Yes: SERGIO Atkins    Note: Patient presents to clinic today feeling well with no questions.      Intravenous Access:  Implanted Port.    Treatment Conditions:  Lab Results   Component Value Date    HGB 13.3 10/17/2017     Lab Results   Component Value Date    WBC 4.2 10/17/2017      Lab Results   Component Value Date    ANEU 2.6 10/17/2017     Lab Results   Component Value Date    PLT 89 10/17/2017      Lab Results   Component Value Date     10/17/2017                   Lab Results   Component Value Date    POTASSIUM 3.5 10/17/2017           No results found for: MAG         Lab Results   Component Value Date    CR 0.87 10/17/2017                   Lab Results   Component Value Date    NIDHI 8.6 10/17/2017                Lab Results   Component Value Date    BILITOTAL 0.7 10/17/2017           Lab Results   Component Value Date    ALBUMIN 3.2 10/17/2017                    Lab Results   Component Value Date    ALT 24 10/17/2017           Lab Results   Component Value Date    AST 19 10/17/2017     Results reviewed, labs MET treatment parameters, ok to proceed with treatment.    Post Infusion Assessment:  Patient tolerated infusion without incident.  Blood return noted pre and post infusion.  Blood return noted during Fluorouracil administration every 2 cc.  Site patent and intact, free from redness, edema or discomfort.  No evidence of extravasations.    Fluorouracil connected per protocol.  Connections taped, temperature sensor taped to skin.  Pump to infuse at 5.2ml/hr for 46 hours.  Disconnect time of 1300 on 10/19/2017 at [facility] called to [facility].  Spoke with Sadia.    Discharge Plan:   Prescription refills given for Dexamethasone, Xarelto and Lidoderm patches.  Discharge instructions reviewed with:  Patient.  Patient and/or family verbalized understanding of discharge instructions and all questions answered.  Copy of AVS reviewed with patient and/or family.  Patient will return 10/30/2017 for next appointment.  Departure Mode: Ambulatory.    Juliet Gama RN

## 2017-10-17 NOTE — MR AVS SNAPSHOT
After Visit Summary   10/17/2017    Reuben Padilla    MRN: [id number]           Patient Information     Date Of Birth          1960        Visit Information        Provider Department      10/17/2017 11:00 AM Katalina Ramirez PA-C [facility]        Today's Diagnoses     Closed fracture of multiple ribs, unspecified laterality, sequela    -  1    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)        Persistent atrial fibrillation (H)        Other pulmonary embolism without acute cor pulmonale, unspecified chronicity (H)           Follow-ups after your visit        Your next 10 appointments already scheduled     Oct 30, 2017 10:15 AM CDT   [facility] Lab Draw with  [facility] LAB DRAW   [facility]    [address] [place] [address]  2nd [facility] [address] [phone]            Oct 30, 2017 10:40 AM CDT   (Arrive by 10:25 AM)   CT CHEST/ABDOMEN/PELVIS W CONTRAST with UCCT2   [facility] [place] [facility] ([facility])    [address] [place] [id number] [phone]           Please bring any scans or X-rays taken at other hospitals, if similar tests were done. Also bring a list of your medicines, including vitamins, minerals and over-the-counter drugs. It is safest to leave personal items at home.  Be sure to tell your doctor:   If you have any allergies.   If there s any chance you are pregnant.   If you are breastfeeding.   If you have any special needs.  You may have contrast for this exam. To prepare:   Do not eat or drink for 2 hours before your exam. If you need to take medicine, you may take it with small sips of water. (We may ask you to take liquid medicine as well.)   The day before your exam, drink extra fluids at least six 8-ounce glasses (unless your doctor tells you to restrict your fluids).  Patients over 70 or patients with diabetes or kidney problems:   If  you haven t had a blood test (creatinine test) within the last 30 days, go to your clinic or Diagnostic Imaging Department for this test.  If you have diabetes:   If your kidney function is normal, continue taking your metformin (Avandamet, Glucophage, Glucovance, Metaglip) on the day of your exam.   If your kidney function is abnormal, wait 48 hours before restarting this medicine.  You will have oral contrast for this exam:   You will drink the contrast at home. Get this from your clinic or Diagnostic Imaging Department. Please follow the directions given.  Please wear loose clothing, such as a sweat suit or jogging clothes. Avoid snaps, zippers and other metal. We may ask you to undress and put on a hospital gown.  If you have any questions, please call the Imaging Department where you will have your exam.            Oct 30, 2017  1:00 PM CDT   (Arrive by 12:45 PM)   Return Visit with Kadi Dee MD   [facility]    [address] [place] [id number] [phone]            Oct 30, 2017  1:30 PM CDT   Infusion 240 with  ONCOLOGY INFUSION, UC 32 ATC   [facility])    [address] [place] [id number]   [phone]            Oct 31, 2017 10:00 AM CDT   (Arrive by 9:45 AM)   NEW WITH ROOM with Dao Lacy PsyD,  2 114 CONSULT RM   [facility]    [address] [place] [address]  2nd [facility] [address]   [phone]            Nov 13, 2017  3:20 PM CST   (Arrive by 3:05 PM)   RETURN ATRIAL FIBULATION VISIT with Romero Guerrero MD   [facility])    [address] [place] [address]  3rd [facility] [id number] [phone]              Who to contact     If you have questions or need follow up information about today's clinic visit  "or your schedule please contact [facility] directly at [phone].  Normal or non-critical lab and imaging results will be communicated to you by [employer], letter or phone within 4 business days after the clinic has received the results. If you do not hear from us within 7 days, please contact the clinic through [employer] or phone. If you have a critical or abnormal lab result, we will notify you by phone as soon as possible.  Submit refill requests through [employer] or call your pharmacy and they will forward the refill request to us. Please allow 3 business days for your refill to be completed.          Additional Information About Your Visit        [employer] Information     [employer] gives you secure access to your electronic health record. If you see a primary care provider, you can also send messages to your care team and make appointments. If you have questions, please call your primary care clinic.  If you do not have a primary care provider, please call [phone] and they will assist you.        Care EveryWhere ID     This is your Care EveryWhere ID. This could be used by other organizations to access your [place] medical records  [id number]        Your Vitals Were     Pulse Temperature Respirations Height Pulse Oximetry BMI (Body Mass Index)    95 98.3  F (36.8  C) (Oral) 18 1.981 m (6' 5.99\") 96% 41.33 kg/m2       Blood Pressure from Last 3 Encounters:   10/17/17 (!) 137/95   10/02/17 (!) 128/92   09/05/17 107/72    Weight from Last 3 Encounters:   10/17/17 (!) 162.2 kg (357 lb 9.6 oz)   10/02/17 (!) 160.1 kg (353 lb)   09/05/17 (!) 166.1 kg (366 lb 1.6 oz)              Today, you had the following     No orders found for display         Today's Medication Changes          These changes are accurate as of: 10/17/17 11:37 AM.  If you have any questions, ask your nurse or doctor.               Start taking these medicines.        Dose/Directions    lidocaine 5 % Patch   Commonly known as:  " LIDODERM   Used for:  Closed fracture of multiple ribs, unspecified laterality, sequela   Started by:  Katalina Ramirez PA-C        Apply up to 3 patches to painful area at once for up to 12 h within a 24 h period.  Remove after 12 hours.   Quantity:  30 patch   Refills:  0         These medicines have changed or have updated prescriptions.        Dose/Directions    * dexamethasone 4 MG tablet   Commonly known as:  DECADRON   This may have changed:  Another medication with the same name was added. Make sure you understand how and when to take each.   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 Make sure you understand how and when to take each.   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 Make sure you understand how and when to take each.   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 Read the directions carefully, and ask your doctor or other care provider to review them with you.         Where to get your medicines      These medications were sent to [place], [place] - [address] [facility] [address]  [address] [facility] [address] [address]    Hours:   TRANSPLANT PHONE NUMBER [phone] Phone:  [phone]     dexamethasone 4 MG tablet    dexamethasone 4 MG tablet    lidocaine 5 % Patch    rivaroxaban ANTICOAGULANT 20 MG Tabs tablet                Primary Care Provider    None Specified       No primary provider on file.        Equal Access to Services     OLLIE SHARPE : Eliza antonio harrison harleen Sonigel, wajeanda luqadaha, qaybta kaalmada adejames, geraldo munson laFranciscaclaude cardenas. So [facility] [phone].    ATENCIÓN: Si habla español, tiene a alamo disposición servicios gratuitos de asistencia lingüística. Llame al [phone].    We comply with applicable federal civil rights laws and [place] laws. We do not discriminate on the basis of race, color, national origin, age, disability, sex, sexual orientation, or gender identity.            Thank you!     Thank you for choosing [facility]  for your care. Our goal is always to provide you with excellent care. Hearing back from our patients is one way we can continue to improve our services. Please take a few minutes to complete the written survey that you may receive in the mail after your visit with us. Thank you!             Your Updated Medication List - Protect others around you: Learn how to safely use, store and throw away your medicines at www.disposemymeds.org.          This list is accurate as of: 10/17/17 11:37 AM.  Always use your most recent med list.                   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)       * dexamethasone 4 MG tablet    DECADRON    6 tablet    Take two tablets daily on days 2,3 and then 1 tablet daily days 4,5    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)       *  dexamethasone 4 MG tablet    DECADRON    6 tablet    Take two tablets daily on days 2,3 and then 1 tablet daily days 4,5    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)       fish oil-omega-3 fatty acids 1000 MG capsule      Take 2 g by mouth daily        IBUPROFEN PO      Take 800 mg by mouth every 8 hours as needed for moderate pain        latanoprost 0.005 % ophthalmic solution    XALATAN     Place into both eyes At Bedtime    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  Remove after 12 hours.    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       Multi-vitamin Tabs tablet      Take 1 tablet by mouth daily        ondansetron 8 MG tablet    ZOFRAN    30 tablet    Take 1 tablet (8 mg) by mouth every 8 hours as needed (Nausea/Vomiting)    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)       prochlorperazine 10 MG tablet    COMPAZINE    30 tablet    Take 1 tablet (10 mg) by mouth every 6 hours as needed (Nausea/Vomiting)    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)       rivaroxaban ANTICOAGULANT 20 MG Tabs tablet    XARELTO    90 tablet    Take 1 tablet (20 mg) by mouth daily (with dinner)    Persistent atrial fibrillation (H), Other pulmonary embolism without acute cor pulmonale, unspecified chronicity (H), 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)       timolol 0.5 % ophthalmic solution    TIMOPTIC     Place into both eyes daily    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)       * zolpidem 12.5 MG CR tablet    AMBIEN CR     30 tablet    Take 1 tablet (12.5 mg) by mouth nightly as needed for sleep    Insomnia, unspecified type       * zolpidem 6.25 MG CR tablet    AMBIEN CR    60 tablet    Take 1 tablet (6.25 mg) by mouth nightly as needed for sleep    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)       * zolpidem 10 MG tablet    AMBIEN    60 tablet    Take 1 tablet (10 mg) by mouth nightly as needed    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 Read the directions carefully, and ask your doctor or other care provider to review them with you.

## 2017-10-17 NOTE — PATIENT INSTRUCTIONS
Contact Numbers    [facility] Main Line: [phone]  [facility] Triage:  [phone]    Call triage with chills and/or temperature greater than or equal to 100.5, uncontrolled nausea/vomiting, diarrhea, constipation, dizziness, shortness of breath, chest pain, bleeding, unexplained bruising, or any new/concerning symptoms, questions/concerns.     If you are having any concerning symptoms or wish to speak to a provider before your next infusion visit, please call your care coordinator or triage to notify them so we can adequately serve you.       After Hours: [phone]    If after hours, weekends, or holidays, call main hospital  and ask for Oncology doctor on call.         October 2017 Sunday Monday Tuesday Wednesday Thursday Friday Saturday   1     LAB    9:00 AM   (15 min.)   UR LAB HOME INFUSION   [facility] Laboratory Services 2     [facility]ONIC LAB DRAW   11:15 AM   (15 min.)    [facility]RAW   [facility]     [facility] RETURN   11:45 AM   (30 min.)   Kadi Dee MD   [facility] ONC INFUSION 240   12:30 PM   (240 min.)    ONCOLOGY INFUSION   [facility] 3     4     5     6     7       8     9     10     11     12     13     14       15     16     17     P [facility] LAB DRAW   10:15 AM   (15 min.)   [facility]   [facility]     [facility] RETURN   10:45 AM   (50 min.)   Katalina Ramirez PA-C   [facility] ONC INFUSION 240   11:30 AM   (240 min.)   UC ONCOLOGY INFUSION   [facility] 18     19     20     21       22     23     24     25     26     27     28       29     30     [facility] LAB DRAW   10:15 AM   (15 min.)    [facility] LAB DRAW   [facility] Lab Draw     CT CHEST/ABDOMEN/PELVIS W   10:25 AM   (20 min.)   UCCT2   [facility]     [facility] RETURN   12:45 PM   (30 min.)   Kadi Dee MD   [facility] ONC  INFUSION 240    1:30 PM   (240 min.)   UC ONCOLOGY INFUSION   [facility] 31     [facility] NEW WITH ROOM    9:45 AM   (60 min.)   Dao Lacy, Amy   [facility]                                November 2017 Sunday Monday Tuesday Wednesday Thursday Friday Saturday                  1     2     3     4       5     6     7     8     9     10     11       12     13     [facility] RETURN ATRIAL FIBRILLATION    3:05 PM   (20 min.)   Romero Guerrero MD   [facility] 14     15     16     17     18       19     20     21     22     23     24     25       26     27     28     29     30                            Lab Results:  Recent Results (from the past 12 hour(s))   CBC with platelets differential    Collection Time: 10/17/17 10:38 AM   Result Value Ref Range    WBC 4.2 4.0 - 11.0 10e9/L    RBC Count 4.27 (L) 4.4 - 5.9 10e12/L    Hemoglobin 13.3 13.3 - 17.7 g/dL    Hematocrit 39.8 (L) 40.0 - 53.0 %    MCV 93 78 - 100 fl    MCH 31.1 26.5 - 33.0 pg    MCHC 33.4 31.5 - 36.5 g/dL    RDW 13.2 10.0 - 15.0 %    Platelet Count 89 (L) 150 - 450 10e9/L    Diff Method Automated Method     % Neutrophils 60.5 %    % Lymphocytes 30.6 %    % Monocytes 7.3 %    % Eosinophils 0.9 %    % Basophils 0.5 %    % Immature Granulocytes 0.2 %    Nucleated RBCs 0 0 /100    Absolute Neutrophil 2.6 1.6 - 8.3 10e9/L    Absolute Lymphocytes 1.3 0.8 - 5.3 10e9/L    Absolute Monocytes 0.3 0.0 - 1.3 10e9/L    Absolute Eosinophils 0.0 0.0 - 0.7 10e9/L    Absolute Basophils 0.0 0.0 - 0.2 10e9/L    Abs Immature Granulocytes 0.0 0 - 0.4 10e9/L    Absolute Nucleated RBC 0.0    Comprehensive metabolic panel    Collection Time: 10/17/17 10:38 AM   Result Value Ref Range    Sodium 142 133 - 144 mmol/L    Potassium 3.5 3.4 - 5.3 mmol/L    Chloride 111 (H) 94 - 109 mmol/L    Carbon Dioxide 24 20 - 32 mmol/L    Anion Gap 7 3 - 14 mmol/L    Glucose 110 (H) 70 - 99 mg/dL    Urea Nitrogen 12 7 - 30 mg/dL    Creatinine 0.87  0.66 - 1.25 mg/dL    GFR Estimate >90 >60 mL/min/1.7m2    GFR Estimate If Black >90 >60 mL/min/1.7m2    Calcium 8.6 8.5 - 10.1 mg/dL    Bilirubin Total 0.7 0.2 - 1.3 mg/dL    Albumin 3.2 (L) 3.4 - 5.0 g/dL    Protein Total 7.0 6.8 - 8.8 g/dL    Alkaline Phosphatase 129 40 - 150 U/L    ALT 24 0 - 70 U/L    AST 19 0 - 45 U/L

## 2017-10-17 NOTE — NURSING NOTE
"Oncology Rooming Note    October 17, 2017 10:49 AM   Reuben Padilla is a 57 year old male who presents for:    Chief Complaint   Patient presents with     Port Draw     Labs drawn via port by RN. Line flushed and hep locked. VS taken.     Oncology Clinic Visit     Return visit related to Esophageal Cancer     Initial Vitals: BP (!) 137/95 (BP Location: Right arm, Patient Position: Sitting, Cuff Size: Adult Regular)  Pulse 95  Temp 98.3  F (36.8  C) (Oral)  Resp 18  Ht 1.981 m (6' 5.99\")  Wt (!) 162.2 kg (357 lb 9.6 oz)  SpO2 96%  BMI 41.33 kg/m2 Estimated body mass index is 41.33 kg/(m^2) as calculated from the following:    Height as of this encounter: 1.981 m (6' 5.99\").    Weight as of this encounter: 162.2 kg (357 lb 9.6 oz). Body surface area is 2.99 meters squared.  No Pain (0) Comment: Data Unavailable   No LMP for male patient.  Allergies reviewed: Yes  Medications reviewed: Yes    Medications: MEDICATION REFILLS NEEDED TODAY. Provider was notified.  Pharmacy name entered into [employer]:    [id number] [place], [place] - [address] [address] [place], [place] -  [facility] [address]    Clinical concerns: Refills needed for dexamethasone 4 MG tablet, rivaroxaban 20 MG tablet. Provider was notified.    10 minutes for nursing intake (face to face time)     Kadi Arevalo LPN            "

## 2017-10-17 NOTE — MR AVS SNAPSHOT
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     If you are having any concerning symptoms or wish to speak to a provider before your next infusion visit, please call your care coordinator or triage to notify them so we can adequately serve you.       After Hours: [phone]    If after hours, weekends, or holidays, call main hospital  and ask for Oncology doctor on call.         October 2017 Sunday Monday Tuesday Wednesday Thursday Friday Saturday   1     LAB    9:00 AM   (15 min.)   UR LAB HOME INFUSION   [facility], Laboratory Services 2     [facility] LAB DRAW   11:15 AM   (15 min.)    [facility] LAB DRAW   [facility] Lab Draw     [facility] RETURN   11:45 AM   (30 min.)   Kadi Dee MD   formerly [facility] ONC INFUSION 240   12:30 PM   (240 min.)    ONCOLOGY INFUSION   [facility] 3     4     5     6     7       8     9     10     11     12     13     14       15     16     17     [facility] DRAW   10:15 AM   (15 min.)    [facility] LAB DRAW   [facility] Lab Draw     [facility] RETURN   10:45 AM   (50 min.)   Katalina Ramirez PA-C   formerly [facility] ONC INFUSION 240   11:30 AM   (240 min.)    ONCOLOGY INFUSION   [facility]  Clinic 18     19     20     21       22     23     24     25     26     27     28       29     30     [facility] LAB DRAW   10:15 AM   (15 min.)    [facility]   [facility] Lab Draw     CT CHEST/ABDOMEN/PELVIS W   10:25 AM   (20 min.)   UCCT2   [facility] CT     [facility] RETURN   12:45 PM   (30 min.)   Kadi Dee MD   [facility] ONC INFUSION 240    1:30 PM   (240 min.)   UC ONCOLOGY INFUSION   [facility] 31     [facility] NEW WITH ROOM    9:45 AM   (60 min.)   Dao Lacy PsyD   [facility]                                November 2017 Sunday Monday Tuesday Wednesday Thursday Friday Saturday                  1     2     3     4       5     6     7     8     9     10     11       12     13     [facility] RETURN ATRIAL FIBRILLATION    3:05 PM   (20 min.)   Romero Guerrero MD   [facility] 14     15     16     17     18       19     20     21     22     23     24     25       26     27     28     29     30                            Lab Results:  Recent Results (from the past 12 hour(s))   CBC with platelets differential    Collection Time: 10/17/17 10:38 AM   Result Value Ref Range    WBC 4.2 4.0 - 11.0 10e9/L    RBC Count 4.27 (L) 4.4 - 5.9 10e12/L    Hemoglobin 13.3 13.3 - 17.7 g/dL    Hematocrit 39.8 (L) 40.0 - 53.0 %    MCV 93 78 - 100 fl    MCH 31.1 26.5 - 33.0 pg    MCHC 33.4 31.5 - 36.5 g/dL    RDW 13.2 10.0 - 15.0 %    Platelet Count 89 (L) 150 - 450 10e9/L    Diff Method Automated Method     % Neutrophils 60.5 %    % Lymphocytes 30.6 %    % Monocytes 7.3 %    % Eosinophils 0.9 %    % Basophils 0.5 %    % Immature Granulocytes 0.2 %    Nucleated RBCs 0 0 /100    Absolute Neutrophil 2.6 1.6 - 8.3 10e9/L    Absolute Lymphocytes 1.3 0.8 - 5.3 10e9/L    Absolute Monocytes 0.3 0.0 - 1.3 10e9/L    Absolute Eosinophils 0.0 0.0 - 0.7 10e9/L    Absolute Basophils 0.0 0.0 - 0.2 10e9/L    Abs Immature Granulocytes  0.0 0 - 0.4 10e9/L    Absolute Nucleated RBC 0.0    Comprehensive metabolic panel    Collection Time: 10/17/17 10:38 AM   Result Value Ref Range    Sodium 142 133 - 144 mmol/L    Potassium 3.5 3.4 - 5.3 mmol/L    Chloride 111 (H) 94 - 109 mmol/L    Carbon Dioxide 24 20 - 32 mmol/L    Anion Gap 7 3 - 14 mmol/L    Glucose 110 (H) 70 - 99 mg/dL    Urea Nitrogen 12 7 - 30 mg/dL    Creatinine 0.87 0.66 - 1.25 mg/dL    GFR Estimate >90 >60 mL/min/1.7m2    GFR Estimate If Black >90 >60 mL/min/1.7m2    Calcium 8.6 8.5 - 10.1 mg/dL    Bilirubin Total 0.7 0.2 - 1.3 mg/dL    Albumin 3.2 (L) 3.4 - 5.0 g/dL    Protein Total 7.0 6.8 - 8.8 g/dL    Alkaline Phosphatase 129 40 - 150 U/L    ALT 24 0 - 70 U/L    AST 19 0 - 45 U/L               Follow-ups after your visit        Your next 10 appointments already scheduled     Oct 30, 2017 10:15 AM CDT   [facility] Draw with  [facility] LAB DRAW   [facility] ([facility]    [address] [place] [id number] [phone]            Oct 30, 2017 10:40 AM CDT   (Arrive by 10:25 AM)   CT CHEST/ABDOMEN/PELVIS W CONTRAST with UCCT2   [facility] Imaging [place] [facility] ([facility])    [address] [place] [id number] [phone]           Please bring any scans or X-rays taken at other hospitals, if similar tests were done. Also bring a list of your medicines, including vitamins, minerals and over-the-counter drugs. It is safest to leave personal items at home.  Be sure to tell your doctor:   If you have any allergies.   If there s any chance you are pregnant.   If you are breastfeeding.   If you have any special needs.  You may have contrast for this exam. To prepare:   Do not eat or drink for 2 hours before your exam. If you need to take medicine, you may take it with small sips of water. (We may ask you to take liquid medicine as well.)   The day before your exam, drink extra  fluids at least six 8-ounce glasses (unless your doctor tells you to restrict your fluids).  Patients over 70 or patients with diabetes or kidney problems:   If you haven t had a blood test (creatinine test) within the last 30 days, go to your clinic or Diagnostic Imaging Department for this test.  If you have diabetes:   If your kidney function is normal, continue taking your metformin (Avandamet, Glucophage, Glucovance, Metaglip) on the day of your exam.   If your kidney function is abnormal, wait 48 hours before restarting this medicine.  You will have oral contrast for this exam:   You will drink the contrast at home. Get this from your clinic or Diagnostic Imaging Department. Please follow the directions given.  Please wear loose clothing, such as a sweat suit or jogging clothes. Avoid snaps, zippers and other metal. We may ask you to undress and put on a hospital gown.  If you have any questions, please call the Imaging Department where you will have your exam.            Oct 30, 2017  1:00 PM CDT   (Arrive by 12:45 PM)   Return Visit with Kadi Dee MD   [facility])    [address] [place] [id number]   [phone]            Oct 30, 2017  1:30 PM CDT   Infusion 240 with  ONCOLOGY INFUSION,  32 ATC   [facility])    [address] [place] [address]  2nd [facility] [address]   [phone]            Oct 31, 2017 10:00 AM CDT   (Arrive by 9:45 AM)   NEW WITH ROOM with Dao Lacy PsyD,  2 114 CONSULT [facility])    [address] [place] [address]  2nd [facility] [address]   [phone]            Nov 13, 2017  3:20 PM CST   (Arrive by 3:05 PM)   RETURN ATRIAL FIBULATION VISIT with Romero Guerrero MD   [facility])    [address] [place] [address]  Se  3rd Floor  [facility] [address] [phone]              Who to contact     If you have questions or need follow up information about today's clinic visit or your schedule please contact [facility] directly at [phone].  Normal or non-critical lab and imaging results will be communicated to you by MyChart, letter or phone within 4 business days after the clinic has received the results. If you do not hear from us within 7 days, please contact the clinic through [employer] or phone. If you have a critical or abnormal lab result, we will notify you by phone as soon as possible.  Submit refill requests through [employer] or call your pharmacy and they will forward the refill request to us. Please allow 3 business days for your refill to be completed.          Additional Information About Your Visit        [employer] Information     [employer] gives you secure access to your electronic health record. If you see a primary care provider, you can also send messages to your care team and make appointments. If you have questions, please call your primary care clinic.  If you do not have a primary care provider, please call [phone] and they will assist you.        Care EveryWhere ID     This is your Care EveryWhere ID. This could be used by other organizations to access your [facility] medical records  [id number]         Blood Pressure from Last 3 Encounters:   10/17/17 (!) 137/95   10/02/17 (!) 128/92   09/05/17 107/72    Weight from Last 3 Encounters:   10/17/17 (!) 162.2 kg (357 lb 9.6 oz)   10/02/17 (!) 160.1 kg (353 lb)   09/05/17 (!) 166.1 kg (366 lb 1.6 oz)              We Performed the Following     CBC with platelets differential     Comprehensive metabolic panel          Today's Medication Changes          These changes are accurate as of: 10/17/17  3:57 PM.  If you have any questions, ask your nurse or doctor.               Start taking these medicines.        Dose/Directions     lidocaine 5 % Patch   Commonly known as:  LIDODERM   Used for:  Closed fracture of multiple ribs, unspecified laterality, sequela   Started by:  Katalina Ramirez PA-C        Apply up to 3 patches to painful area at once for up to 12 h within a 24 h period.  Remove after 12 hours.   Quantity:  30 patch   Refills:  0         These medicines have changed or have updated prescriptions.        Dose/Directions    * dexamethasone 4 MG tablet   Commonly known as:  DECADRON   This may have changed:  Another medication with the same name was added. Make sure you understand how and when to take each.   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 Make sure you understand how and when to take each.   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 Make sure you understand how and when to take each.   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 Read the directions carefully, and ask your doctor or other care provider to review them with you.         Where to get your medicines      These medications were sent to [place], [place] - [address] [place] [address] [phone]  [address] [place] [address]  [address], [facility] [address]    Hours:  TRANSPLANT PHONE NUMBER [phone] Phone:  [phone]     dexamethasone 4 MG tablet    dexamethasone 4 MG tablet    lidocaine 5 % Patch    rivaroxaban ANTICOAGULANT 20 MG Tabs tablet                Primary Care Provider    None Specified       No primary provider on file.        Equal Access to Services     OLLIE SHARPE : Eliza harrison hadolgao Sojose antonioali, waaxda luqadaha, qaybta kaalmada adeegyada, geraldo junior valorieotilio paintingpalthao cardenas. So [facility] [phone].    ATENCIÓN: Si habla español, tiene a alamo disposición servicios gratuitos de asistencia lingüística. Ariannaame al [phone].    We comply with applicable federal civil rights laws and [place] laws. We do not discriminate on the basis of race, color, national origin, age, disability, sex, sexual orientation, or gender identity.            Thank you!     Thank you for choosing [facility]  for your care. Our goal is always to provide you with excellent care. Hearing back from our patients is one way we can continue to improve our services. Please take a few minutes to complete the written survey that you may receive in the mail after your visit with us. Thank you!             Your Updated Medication List - Protect others around you: Learn how to safely use, store and throw away your medicines at www.disposemymeds.org.          This list is accurate as of: 10/17/17  3:57 PM.  Always use your most recent med list.                   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)       * dexamethasone 4 MG tablet    DECADRON    6 tablet    Take two tablets daily on days 2,3 and then 1 tablet daily days 4,5    Cancer of distal  third of esophagus (H)       * dexamethasone 4 MG tablet    DECADRON    6 tablet    Take two tablets daily on days 2,3 and then 1 tablet daily days 4,5    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)       fish oil-omega-3 fatty acids 1000 MG capsule      Take 2 g by mouth daily        IBUPROFEN PO      Take 800 mg by mouth every 8 hours as needed for moderate pain        latanoprost 0.005 % ophthalmic solution    XALATAN     Place into both eyes At Bedtime    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  Remove after 12 hours.    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       Multi-vitamin Tabs tablet      Take 1 tablet by mouth daily        ondansetron 8 MG tablet    ZOFRAN    30 tablet    Take 1 tablet (8 mg) by mouth every 8 hours as needed (Nausea/Vomiting)    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)       prochlorperazine 10 MG tablet    COMPAZINE    30 tablet    Take 1 tablet (10 mg) by mouth every 6 hours as needed (Nausea/Vomiting)    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)       rivaroxaban ANTICOAGULANT 20 MG Tabs tablet    XARELTO    90 tablet    Take 1 tablet (20 mg) by mouth daily (with dinner)    Persistent atrial fibrillation (H), Other pulmonary embolism without acute cor pulmonale, unspecified chronicity (H), 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)       timolol 0.5 % ophthalmic solution    TIMOPTIC     Place into both eyes daily    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 Read the directions carefully, and ask your doctor or other care provider to review them with you.

## 2017-10-17 NOTE — PROGRESS NOTES
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 His tumor is positive for cytokeratin 20, negative for P63 and CK7, and HER2 is negative. He started on FOLFOX (5FU/oxaliplatin) on 5/15/2017. He had a delay in treatment from 9/5-10/2/17 due to work related injury.    INTERVAL HISTORY:   Reuben is here with his sister today. Chemo went well for him on 10/2. He reports the oral dex \"makes a world of difference\" with the nausea, and to some extent with the fatigue, however he is not as active now due to the injury, and that is tiring to him. He continues to have some bilateral rib pain. Uses narcotics sparingly. He uses a dose of Ibuprofen on occasion, 800 mg. He hasn't had any bleeding. No headaches. His sister notes the mentation is improved since the accident. Levi does report that he has had some dizziness since the accident, which is not worsening but still present at times. Neuropathy is actually better. No fevers, chills, cough, SOB, chest pain, vomiting, abdominal pain, bowel changes, swelling, palpitations, or dizziness. Remainder of 10-pt ROS otherwise is negative.    Current Outpatient Prescriptions   Medication Sig Dispense Refill     cyclobenzaprine (FLEXERIL) 10 MG tablet Take 10 mg by mouth       zolpidem (AMBIEN) 10 MG tablet Take 1 tablet (10 mg) by mouth nightly as needed 60 tablet 1     dexamethasone (DECADRON) 4 MG tablet Take two tablets daily on days 2,3 and then 1 tablet daily days 4,5 6 tablet 1     dexamethasone (DECADRON) 4 MG tablet Take two tablets daily on days 2,3 and then 1 tablet daily days 4,5 6 tablet 1     rivaroxaban ANTICOAGULANT (XARELTO) 20 MG TABS tablet Take 1 tablet (20 mg) by mouth daily (with dinner) 90 tablet 3     zolpidem (AMBIEN CR) 6.25 MG CR tablet Take 1 tablet (6.25 mg) by mouth " "nightly as needed for sleep 60 tablet 0     zolpidem (AMBIEN CR) 12.5 MG CR tablet Take 1 tablet (12.5 mg) by mouth nightly as needed for sleep (Patient not taking: Reported on 9/5/2017) 30 tablet 1     ondansetron (ZOFRAN) 8 MG tablet Take 1 tablet (8 mg) by mouth every 8 hours as needed (Nausea/Vomiting) (Patient not taking: Reported on 10/2/2017) 30 tablet 2     lidocaine-prilocaine (EMLA) cream Apply topically as needed for moderate pain 30 g 3     prochlorperazine (COMPAZINE) 10 MG tablet Take 1 tablet (10 mg) by mouth every 6 hours as needed (Nausea/Vomiting) (Patient not taking: Reported on 10/2/2017) 30 tablet 2     LORazepam (ATIVAN) 0.5 MG tablet Take 1 tablet (0.5 mg) by mouth every 6 hours as needed for anxiety (Patient not taking: Reported on 10/2/2017) 60 tablet 1     multivitamin, therapeutic with minerals (MULTI-VITAMIN) TABS tablet Take 1 tablet by mouth daily       fish oil-omega-3 fatty acids 1000 MG capsule Take 2 g by mouth daily       IBUPROFEN PO Take 800 mg by mouth every 8 hours as needed for moderate pain       latanoprost (XALATAN) 0.005 % ophthalmic solution Place into both eyes At Bedtime       timolol (TIMOPTIC) 0.5 % ophthalmic solution Place into both eyes daily            Allergies   Allergen Reactions     Penicillin G Other (See Comments)     Pt not sure-     Penicillins Unknown       PHYSICAL EXAMINATION  BP (!) 137/95 (BP Location: Right arm, Patient Position: Sitting, Cuff Size: Adult Regular)  Pulse 95  Temp 98.3  F (36.8  C) (Oral)  Resp 18  Ht 1.981 m (6' 5.99\")  Wt (!) 162.2 kg (357 lb 9.6 oz)  SpO2 96%  BMI 41.33 kg/m2  Wt Readings from Last 4 Encounters:   10/17/17 (!) 162.2 kg (357 lb 9.6 oz)   10/02/17 (!) 160.1 kg (353 lb)   09/05/17 (!) 166.1 kg (366 lb 1.6 oz)   08/22/17 (!) 168 kg (370 lb 4.8 oz)     Constitutional: Alert, oriented male in no visible distress.  Eyes: PERRL. Anicteric sclerae.  ENT/Mouth: OM moist and pink without lesions or thrush.  CV: sinus " rhythm today, RRR  Resp: CTAB throughout  Abdomen: Soft, non-tender, non-distended. Bowel sounds present.Limited by body habitus and seated position  Extremities: No lower extremity edema appreciated.  Skin: Warm, dry. Fading bruise on R upper arm s/p work injury. No other  bruising or petechiae noted. Some mild venous stasis changes on LE bilat.  Lymph: No cervical or supraclavicular lymphadenopathy appreciated.   Neuro: CN II-XII grossly intact.    LABS:  Results for REUBEN CHANEY (MRN [id number]) as of 10/17/2017 10:55   Ref. Range 10/2/2017 11:36 10/17/2017 10:38   WBC Latest Ref Range: 4.0 - 11.0 10e9/L 4.4 4.2   Hemoglobin Latest Ref Range: 13.3 - 17.7 g/dL 14.0 13.3   Hematocrit Latest Ref Range: 40.0 - 53.0 % 41.5 39.8 (L)   Platelet Count Latest Ref Range: 150 - 450 10e9/L 173 89 (L)     Results for REUBEN CHANEY (MRN [id number]) as of 10/17/2017 11:36   Ref. Range 10/17/2017 10:38   Sodium Latest Ref Range: 133 - 144 mmol/L 142   Potassium Latest Ref Range: 3.4 - 5.3 mmol/L 3.5   Chloride Latest Ref Range: 94 - 109 mmol/L 111 (H)   Carbon Dioxide Latest Ref Range: 20 - 32 mmol/L 24   Urea Nitrogen Latest Ref Range: 7 - 30 mg/dL 12   Creatinine Latest Ref Range: 0.66 - 1.25 mg/dL 0.87   GFR Estimate Latest Ref Range: >60 mL/min/1.7m2 >90   GFR Estimate If Black Latest Ref Range: >60 mL/min/1.7m2 >90   Calcium Latest Ref Range: 8.5 - 10.1 mg/dL 8.6   Anion Gap Latest Ref Range: 3 - 14 mmol/L 7   Albumin Latest Ref Range: 3.4 - 5.0 g/dL 3.2 (L)   Protein Total Latest Ref Range: 6.8 - 8.8 g/dL 7.0   Bilirubin Total Latest Ref Range: 0.2 - 1.3 mg/dL 0.7   Alkaline Phosphatase Latest Ref Range: 40 - 150 U/L 129   ALT Latest Ref Range: 0 - 70 U/L 24   AST Latest Ref Range: 0 - 45 U/L 19     IMPRESSION/PLAN:  Reuben Chaney is a 56 year old male with metastatic esophogeal involvement the liver and widespread lavon involvement, currently on FOLFOX.     1. Metastatic esophogeal cancer. Continues on FOLFOX  with dose reduction of oxaliplatin due to neuropathy. He had partial response on imaging in September. He had a delay in treatment in September due to work-related injury resulting in multiple rib and spine fractures. He resumed chemotherapy on 10/2. Overall this went well for him with the Decadron PO. We will continue today. Restaging after this cycle.    2. Nausea. Improved with Emend and Decadron.     3. A fib. Intermittent a-fib. Sinus today. He is on Xarelto for PE.    4. Incidental PE: Identified on restaging 7/10/17. He continues on Xarelto. Will continue to monitor PLT.    5. Insomnia: Currently on Ambien    6. Fatigue: Slightly improved with Dex, however still with fatigue 2/2 chemo and recent accident    7. Work-related injury involving skull, vertebrae, rib fractures: Improving gradually. Still having pain in the ribs, using narcotics sparingly. Advised against Ibuprofen. Will trial Lidoderm patches, will do OTC if not covered. His mentation has improved per his sister, but still having some residual dizziness. He will monitor for any worsening.      Katalina Ramirez PA-C  Hematology, Oncology, and Transplant  [facility]  [address] [place] [id number] [phone]

## 2017-10-17 NOTE — LETTER
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 His tumor is positive for cytokeratin 20, negative for P63 and CK7, and HER2 is negative. He started on FOLFOX (5FU/oxaliplatin) on 5/15/2017. He had a delay in treatment from 9/5-10/2/17 due to work related injury.    INTERVAL HISTORY:   Reuben is here with his sister today. Chemo went well for him on 10/2. He reports the oral dex \"makes a world of difference\" with the nausea, and to some extent with the fatigue, however he is not as active now due to the injury, and that is tiring to him. He continues to have some bilateral rib pain. Uses narcotics sparingly. He uses a dose of Ibuprofen on occasion, 800 mg. He hasn't had any bleeding. No headaches. His sister notes the mentation is improved since the accident. Levi does report that he has had some dizziness since the accident, which is not worsening but still present at times. Neuropathy is actually better. No fevers, chills, cough, SOB, chest pain, vomiting, abdominal pain, bowel changes, swelling, palpitations, or dizziness. Remainder of 10-pt ROS otherwise is negative.    Current Outpatient Prescriptions   Medication Sig Dispense Refill     cyclobenzaprine (FLEXERIL) 10 MG tablet Take 10 mg by mouth       zolpidem (AMBIEN) 10 MG tablet Take 1 tablet (10 mg) by mouth nightly as needed 60 tablet 1     dexamethasone (DECADRON) 4 MG tablet Take two tablets daily on days 2,3 and then 1 tablet daily days 4,5 6 tablet 1     dexamethasone (DECADRON) 4 MG tablet Take two tablets daily on days 2,3 and then 1 tablet daily days 4,5 6 tablet 1     rivaroxaban ANTICOAGULANT (XARELTO) 20 MG TABS tablet Take 1 tablet (20 mg) by mouth daily (with dinner) 90 tablet " "3     zolpidem (AMBIEN CR) 6.25 MG CR tablet Take 1 tablet (6.25 mg) by mouth nightly as needed for sleep 60 tablet 0     zolpidem (AMBIEN CR) 12.5 MG CR tablet Take 1 tablet (12.5 mg) by mouth nightly as needed for sleep (Patient not taking: Reported on 9/5/2017) 30 tablet 1     ondansetron (ZOFRAN) 8 MG tablet Take 1 tablet (8 mg) by mouth every 8 hours as needed (Nausea/Vomiting) (Patient not taking: Reported on 10/2/2017) 30 tablet 2     lidocaine-prilocaine (EMLA) cream Apply topically as needed for moderate pain 30 g 3     prochlorperazine (COMPAZINE) 10 MG tablet Take 1 tablet (10 mg) by mouth every 6 hours as needed (Nausea/Vomiting) (Patient not taking: Reported on 10/2/2017) 30 tablet 2     LORazepam (ATIVAN) 0.5 MG tablet Take 1 tablet (0.5 mg) by mouth every 6 hours as needed for anxiety (Patient not taking: Reported on 10/2/2017) 60 tablet 1     multivitamin, therapeutic with minerals (MULTI-VITAMIN) TABS tablet Take 1 tablet by mouth daily       fish oil-omega-3 fatty acids 1000 MG capsule Take 2 g by mouth daily       IBUPROFEN PO Take 800 mg by mouth every 8 hours as needed for moderate pain       latanoprost (XALATAN) 0.005 % ophthalmic solution Place into both eyes At Bedtime       timolol (TIMOPTIC) 0.5 % ophthalmic solution Place into both eyes daily            Allergies   Allergen Reactions     Penicillin G Other (See Comments)     Pt not sure-     Penicillins Unknown       PHYSICAL EXAMINATION  BP (!) 137/95 (BP Location: Right arm, Patient Position: Sitting, Cuff Size: Adult Regular)  Pulse 95  Temp 98.3  F (36.8  C) (Oral)  Resp 18  Ht 1.981 m (6' 5.99\")  Wt (!) 162.2 kg (357 lb 9.6 oz)  SpO2 96%  BMI 41.33 kg/m2  Wt Readings from Last 4 Encounters:   10/17/17 (!) 162.2 kg (357 lb 9.6 oz)   10/02/17 (!) 160.1 kg (353 lb)   09/05/17 (!) 166.1 kg (366 lb 1.6 oz)   08/22/17 (!) 168 kg (370 lb 4.8 oz)     Constitutional: Alert, oriented male in no visible distress.  Eyes: PERRL. Anicteric " sclerae.  ENT/Mouth: OM moist and pink without lesions or thrush.  CV: sinus rhythm today, RRR  Resp: CTAB throughout  Abdomen: Soft, non-tender, non-distended. Bowel sounds present.Limited by body habitus and seated position  Extremities: No lower extremity edema appreciated.  Skin: Warm, dry. Fading bruise on R upper arm s/p work injury. No other  bruising or petechiae noted. Some mild venous stasis changes on LE bilat.  Lymph: No cervical or supraclavicular lymphadenopathy appreciated.   Neuro: CN II-XII grossly intact.    LABS:  Results for REUBEN CHANEY (MRN [id number]) as of 10/17/2017 10:55   Ref. Range 10/2/2017 11:36 10/17/2017 10:38   WBC Latest Ref Range: 4.0 - 11.0 10e9/L 4.4 4.2   Hemoglobin Latest Ref Range: 13.3 - 17.7 g/dL 14.0 13.3   Hematocrit Latest Ref Range: 40.0 - 53.0 % 41.5 39.8 (L)   Platelet Count Latest Ref Range: 150 - 450 10e9/L 173 89 (L)     Results for REUBEN CHANEY (MRN [id number]) as of 10/17/2017 11:36   Ref. Range 10/17/2017 10:38   Sodium Latest Ref Range: 133 - 144 mmol/L 142   Potassium Latest Ref Range: 3.4 - 5.3 mmol/L 3.5   Chloride Latest Ref Range: 94 - 109 mmol/L 111 (H)   Carbon Dioxide Latest Ref Range: 20 - 32 mmol/L 24   Urea Nitrogen Latest Ref Range: 7 - 30 mg/dL 12   Creatinine Latest Ref Range: 0.66 - 1.25 mg/dL 0.87   GFR Estimate Latest Ref Range: >60 mL/min/1.7m2 >90   GFR Estimate If Black Latest Ref Range: >60 mL/min/1.7m2 >90   Calcium Latest Ref Range: 8.5 - 10.1 mg/dL 8.6   Anion Gap Latest Ref Range: 3 - 14 mmol/L 7   Albumin Latest Ref Range: 3.4 - 5.0 g/dL 3.2 (L)   Protein Total Latest Ref Range: 6.8 - 8.8 g/dL 7.0   Bilirubin Total Latest Ref Range: 0.2 - 1.3 mg/dL 0.7   Alkaline Phosphatase Latest Ref Range: 40 - 150 U/L 129   ALT Latest Ref Range: 0 - 70 U/L 24   AST Latest Ref Range: 0 - 45 U/L 19     IMPRESSION/PLAN:  Reuben Chaney is a 56 year old male with metastatic esophogeal involvement the liver and widespread lavon involvement,  currently on FOLFOX.     1. Metastatic esophogeal cancer. Continues on FOLFOX with dose reduction of oxaliplatin due to neuropathy. He had partial response on imaging in September. He had a delay in treatment in September due to work-related injury resulting in multiple rib and spine fractures. He resumed chemotherapy on 10/2. Overall this went well for him with the Decadron PO. We will continue today. Restaging after this cycle.    2. Nausea. Improved with Emend and Decadron.     3. A fib. Intermittent a-fib. Sinus today. He is on Xarelto for PE.    4. Incidental PE: Identified on restaging 7/10/17. He continues on Xarelto. Will continue to monitor PLT.    5. Insomnia: Currently on Ambien    6. Fatigue: Slightly improved with Dex, however still with fatigue 2/2 chemo and recent accident    7. Work-related injury involving skull, vertebrae, rib fractures: Improving gradually. Still having pain in the ribs, using narcotics sparingly. Advised against Ibuprofen. Will trial Lidoderm patches, will do OTC if not covered. His mentation has improved per his sister, but still having some residual dizziness. He will monitor for any worsening.      Katalina Ramirez PA-C  Hematology, Oncology, and Transplant  [facility]  [address] [place] [id number] [phone]

## 2017-10-17 NOTE — PROGRESS NOTES
Received a call from Jessica in pathology, there is not enough tissue left in the block to do the PD-L1 or MSI, All tissue has been exhausted.  Dr Dee notified.

## 2017-10-17 NOTE — NURSING NOTE
Chief Complaint   Patient presents with     Port Draw     Labs drawn via port by RN. Line flushed and hep locked. VS taken.     Olga Ashley RN

## 2017-10-25 NOTE — PROGRESS NOTES
This is a recent snapshot of the patient's [facility] medical record.  For current drug dose and complete information and questions, call [phone] or In Basket pool, fv home infusion ([id number])  CSN Number:  [id number]

## 2017-10-29 NOTE — PROGRESS NOTES
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 His tumor is positive for cytokeratin 20, negative for P63 and CK7, and HER2 is negative. He started on FOLFOX (5FU/oxaliplatin) on 5/15/2017. He had a delay in treatment from 9/5-10/2/17 due to work related injury.    INTERVAL HISTORY:   Reuben is here with his sister and son for two week follow-up. He lives six hours away in [place], travels down here for each cycle of chemo. Previously has stayed at the [facility] for about 5 days and is disconnected from the 5-FU pump before traveling back home. This time he plans on leaving tomorrow, with the 5-FU pump still running, and have it disconnected at a clinic near home.     The last cycle went much better from a nausea standpoint with the dexamethasone added for days 2-5. He did have more diarrhea than usual. This was during the second week and lasted for 3-4 days. Controlled with Imodium and stayed hydrated.     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 Able to button his shirt, doesn't affect his functioning. Neuropathy in his feet is better.     No f/c or cough/cold symptoms. Still has pain in the ribs from his traumatic accident a couple months ago, exacerbated when sitting up from lying position. He does not feel he needs any pain medications.     A complete 10-pt ROS is negative other than what is reported above      PHYSICAL EXAMINATION  Pulse 92  Temp 99  F (37.2  C) (Oral)  Resp 16  Wt (!) 159.3 kg (351 lb 4.8 oz)  SpO2 96%  BMI 40.61 kg/m2 /90  Wt Readings from Last 4 Encounters:   10/30/17 (!) 159.3 kg (351 lb 4.8 oz)   10/17/17 (!) 162.2 kg (357 lb 9.6 oz)   10/02/17 (!) 160.1 kg (353 lb)   09/05/17 (!) 166.1 kg (366 lb 1.6 oz)     Constitutional: Alert, oriented male in no  visible distress.  Eyes: PERRL. Anicteric sclerae.  ENT/Mouth: OM moist and pink without lesions or thrush.  CV: RRR  Resp: CTAB throughout  Abdomen: Soft, non-tender, non-distended. Obese. Bowel sounds present. Unable to palpate liver or spleen.   Extremities: No lower extremity edema   Skin: Warm, dry. mild venous stasis changes on LE bilat.  Lymph: No cervical or supraclavicular lymphadenopathy appreciated.   Neuro: CN II-XII grossly intact.    LABS:  Cr 0.8, Na 142, LFTs wnl  WBC 4.5, Hb 14.1, Plts 89    CT cap 10/30/17: stable compared to prior. Reviewed in detail with the patient    IMPRESSION/PLAN:  1. Metastatic esophogeal cancer. On FOLFOX since May 2017, with dose reduction of oxaliplatin due to neuropathy. He had partial response on imaging in September. Treatment was held in September due to a traumatic work-related injury resulting in multiple rib and spine fractures. He resumed chemotherapy on 10/2 and scan today is stable. Tolerating therapy reasonably well and labs are adequate.     Proceed with cycle 12 today. Continue every 2 weeks with BERENICE appt before each cycle.   F/u with Dr. Dee in 8-10 weeks with repeat imaging.     If he has disease progression then Cyramza/Taxol is 2nd line (if neuropathy allows for Taxol). We will need to rebiopsy for PD-L1 testing at some point, since Keytruda is now approved for 3rd line and tissue we have now is not adequate for testing.      2. Nausea. Improved with Emend and Decadron.     3. Incidental PE: Identified on restaging 7/10/17. He continues on Xarelto. Will continue to monitor PLT.    Patient seen and d/w staff Dr. Leila Bennett MD  Heme/Onc Fellow  Pager: [phone]    Pt was seen and evaluated by me with Dr Bennett.  Imaging reveals relatively stable response to his current treatment regimen of FOLFOX.  He is  Tolerating the therapy well with mild grade 1 neuropathy.    We discussed continuing the current course of treatment.      Proceed with  FOLFOX x 3-4 more cycles and then reimage.      He remains on Xarelto for incidental PE.    We discussed that he does not have enough tissue for PD 1 testing and may need a new biopsy in the future.  Kadi Dee MD

## 2017-10-30 ENCOUNTER — INFUSION THERAPY VISIT (OUTPATIENT)
Dept: ONCOLOGY | Facility: CLINIC | Age: 57
End: 2017-10-30
Attending: INTERNAL MEDICINE
Payer: COMMERCIAL

## 2017-10-30 ENCOUNTER — CARE COORDINATION (OUTPATIENT)
Dept: ONCOLOGY | Facility: CLINIC | Age: 57
End: 2017-10-30

## 2017-10-30 ENCOUNTER — APPOINTMENT (OUTPATIENT)
Dept: LAB | Facility: CLINIC | Age: 57
End: 2017-10-30
Attending: INTERNAL MEDICINE
Payer: COMMERCIAL

## 2017-10-30 VITALS
WEIGHT: 315 LBS | TEMPERATURE: 99 F | RESPIRATION RATE: 16 BRPM | HEART RATE: 92 BPM | BODY MASS INDEX: 40.61 KG/M2 | OXYGEN SATURATION: 96 %

## 2017-10-30 DIAGNOSIS — C15.5 CANCER OF DISTAL THIRD OF ESOPHAGUS (H): Primary | ICD-10-CM

## 2017-10-30 DIAGNOSIS — C15.5 CANCER OF DISTAL THIRD OF ESOPHAGUS (H): ICD-10-CM

## 2017-10-30 LAB
ALBUMIN SERPL-MCNC: 3.4 G/DL (ref 3.4–5)
ALP SERPL-CCNC: 118 U/L (ref 40–150)
ALT SERPL W P-5'-P-CCNC: 27 U/L (ref 0–70)
ANION GAP SERPL CALCULATED.3IONS-SCNC: 9 MMOL/L (ref 3–14)
AST SERPL W P-5'-P-CCNC: 23 U/L (ref 0–45)
BASOPHILS # BLD AUTO: 0 10E9/L (ref 0–0.2)
BASOPHILS NFR BLD AUTO: 0.2 %
BILIRUB SERPL-MCNC: 0.7 MG/DL (ref 0.2–1.3)
BUN SERPL-MCNC: 13 MG/DL (ref 7–30)
CALCIUM SERPL-MCNC: 8.5 MG/DL (ref 8.5–10.1)
CHLORIDE SERPL-SCNC: 110 MMOL/L (ref 94–109)
CO2 SERPL-SCNC: 24 MMOL/L (ref 20–32)
CREAT SERPL-MCNC: 0.84 MG/DL (ref 0.66–1.25)
DIFFERENTIAL METHOD BLD: ABNORMAL
EOSINOPHIL # BLD AUTO: 0 10E9/L (ref 0–0.7)
EOSINOPHIL NFR BLD AUTO: 0.4 %
ERYTHROCYTE [DISTWIDTH] IN BLOOD BY AUTOMATED COUNT: 13.5 % (ref 10–15)
GFR SERPL CREATININE-BSD FRML MDRD: >90 ML/MIN/1.7M2
GLUCOSE SERPL-MCNC: 104 MG/DL (ref 70–99)
HCT VFR BLD AUTO: 41.5 % (ref 40–53)
HGB BLD-MCNC: 14.1 G/DL (ref 13.3–17.7)
IMM GRANULOCYTES # BLD: 0 10E9/L (ref 0–0.4)
IMM GRANULOCYTES NFR BLD: 0.2 %
LYMPHOCYTES # BLD AUTO: 1.2 10E9/L (ref 0.8–5.3)
LYMPHOCYTES NFR BLD AUTO: 25.8 %
MCH RBC QN AUTO: 31.3 PG (ref 26.5–33)
MCHC RBC AUTO-ENTMCNC: 34 G/DL (ref 31.5–36.5)
MCV RBC AUTO: 92 FL (ref 78–100)
MONOCYTES # BLD AUTO: 0.4 10E9/L (ref 0–1.3)
MONOCYTES NFR BLD AUTO: 9 %
NEUTROPHILS # BLD AUTO: 2.9 10E9/L (ref 1.6–8.3)
NEUTROPHILS NFR BLD AUTO: 64.4 %
NRBC # BLD AUTO: 0 10*3/UL
NRBC BLD AUTO-RTO: 0 /100
PLATELET # BLD AUTO: 89 10E9/L (ref 150–450)
POTASSIUM SERPL-SCNC: 3.8 MMOL/L (ref 3.4–5.3)
PROT SERPL-MCNC: 7.2 G/DL (ref 6.8–8.8)
RBC # BLD AUTO: 4.51 10E12/L (ref 4.4–5.9)
SODIUM SERPL-SCNC: 142 MMOL/L (ref 133–144)
WBC # BLD AUTO: 4.5 10E9/L (ref 4–11)

## 2017-10-30 PROCEDURE — 96415 CHEMO IV INFUSION ADDL HR: CPT

## 2017-10-30 PROCEDURE — 99214 OFFICE O/P EST MOD 30 MIN: CPT | Mod: GC | Performed by: INTERNAL MEDICINE

## 2017-10-30 PROCEDURE — 80053 COMPREHEN METABOLIC PANEL: CPT | Performed by: INTERNAL MEDICINE

## 2017-10-30 PROCEDURE — 96368 THER/DIAG CONCURRENT INF: CPT

## 2017-10-30 PROCEDURE — 96411 CHEMO IV PUSH ADDL DRUG: CPT

## 2017-10-30 PROCEDURE — 96375 TX/PRO/DX INJ NEW DRUG ADDON: CPT

## 2017-10-30 PROCEDURE — 96367 TX/PROPH/DG ADDL SEQ IV INF: CPT

## 2017-10-30 PROCEDURE — 25000128 H RX IP 250 OP 636: Mod: ZF | Performed by: INTERNAL MEDICINE

## 2017-10-30 PROCEDURE — 96413 CHEMO IV INFUSION 1 HR: CPT

## 2017-10-30 PROCEDURE — 96416 CHEMO PROLONG INFUSE W/PUMP: CPT

## 2017-10-30 PROCEDURE — 99212 OFFICE O/P EST SF 10 MIN: CPT | Mod: ZF

## 2017-10-30 PROCEDURE — 85025 COMPLETE CBC W/AUTO DIFF WBC: CPT | Performed by: INTERNAL MEDICINE

## 2017-10-30 RX ORDER — ALBUTEROL SULFATE 90 UG/1
1-2 AEROSOL, METERED RESPIRATORY (INHALATION)
Status: CANCELLED
Start: 2017-10-30

## 2017-10-30 RX ORDER — SODIUM CHLORIDE 9 MG/ML
1000 INJECTION, SOLUTION INTRAVENOUS CONTINUOUS PRN
Status: CANCELLED
Start: 2017-10-30

## 2017-10-30 RX ORDER — DEXAMETHASONE 4 MG/1
TABLET ORAL
Qty: 6 TABLET | Refills: 1 | Status: SHIPPED | OUTPATIENT
Start: 2017-10-30 | End: 2018-01-08

## 2017-10-30 RX ORDER — HEPARIN SODIUM (PORCINE) LOCK FLUSH IV SOLN 100 UNIT/ML 100 UNIT/ML
5 SOLUTION INTRAVENOUS
Status: COMPLETED | OUTPATIENT
Start: 2017-10-30 | End: 2017-10-30

## 2017-10-30 RX ORDER — LORAZEPAM 2 MG/ML
0.5 INJECTION INTRAMUSCULAR EVERY 4 HOURS PRN
Status: CANCELLED
Start: 2017-10-30

## 2017-10-30 RX ORDER — ALBUTEROL SULFATE 0.83 MG/ML
2.5 SOLUTION RESPIRATORY (INHALATION)
Status: CANCELLED | OUTPATIENT
Start: 2017-10-30

## 2017-10-30 RX ORDER — EPINEPHRINE 1 MG/ML
0.3 INJECTION, SOLUTION, CONCENTRATE INTRAVENOUS EVERY 5 MIN PRN
Status: CANCELLED | OUTPATIENT
Start: 2017-10-30

## 2017-10-30 RX ORDER — FLUOROURACIL 50 MG/ML
400 INJECTION, SOLUTION INTRAVENOUS ONCE
Status: CANCELLED | OUTPATIENT
Start: 2017-10-30

## 2017-10-30 RX ORDER — CYCLOBENZAPRINE HCL 10 MG
10 TABLET ORAL
COMMUNITY
Start: 2017-09-19 | End: 2017-11-28

## 2017-10-30 RX ORDER — FLUOROURACIL 50 MG/ML
400 INJECTION, SOLUTION INTRAVENOUS ONCE
Status: COMPLETED | OUTPATIENT
Start: 2017-10-30 | End: 2017-10-30

## 2017-10-30 RX ORDER — PALONOSETRON 0.05 MG/ML
0.25 INJECTION, SOLUTION INTRAVENOUS ONCE
Status: CANCELLED | OUTPATIENT
Start: 2017-10-30 | End: 2017-10-30

## 2017-10-30 RX ORDER — METHYLPREDNISOLONE SODIUM SUCCINATE 125 MG/2ML
125 INJECTION, POWDER, LYOPHILIZED, FOR SOLUTION INTRAMUSCULAR; INTRAVENOUS
Status: CANCELLED
Start: 2017-10-30

## 2017-10-30 RX ORDER — EPINEPHRINE 0.3 MG/.3ML
0.3 INJECTION SUBCUTANEOUS EVERY 5 MIN PRN
Status: CANCELLED | OUTPATIENT
Start: 2017-10-30

## 2017-10-30 RX ORDER — DIPHENHYDRAMINE HYDROCHLORIDE 50 MG/ML
50 INJECTION INTRAMUSCULAR; INTRAVENOUS
Status: CANCELLED
Start: 2017-10-30

## 2017-10-30 RX ORDER — PALONOSETRON 0.05 MG/ML
0.25 INJECTION, SOLUTION INTRAVENOUS ONCE
Status: COMPLETED | OUTPATIENT
Start: 2017-10-30 | End: 2017-10-30

## 2017-10-30 RX ORDER — MEPERIDINE HYDROCHLORIDE 25 MG/ML
25 INJECTION INTRAMUSCULAR; INTRAVENOUS; SUBCUTANEOUS EVERY 30 MIN PRN
Status: CANCELLED | OUTPATIENT
Start: 2017-10-30

## 2017-10-30 RX ADMIN — FLUOROURACIL 1220 MG: 50 INJECTION, SOLUTION INTRAVENOUS at 17:27

## 2017-10-30 RX ADMIN — LEUCOVORIN CALCIUM 1050 MG: 350 INJECTION, POWDER, LYOPHILIZED, FOR SOLUTION INTRAMUSCULAR; INTRAVENOUS at 15:25

## 2017-10-30 RX ADMIN — DEXTROSE 250 ML: 5 SOLUTION INTRAVENOUS at 14:42

## 2017-10-30 RX ADMIN — PALONOSETRON HYDROCHLORIDE 0.25 MG: 0.25 INJECTION INTRAVENOUS at 14:43

## 2017-10-30 RX ADMIN — SODIUM CHLORIDE, PRESERVATIVE FREE 5 ML: 5 INJECTION INTRAVENOUS at 10:17

## 2017-10-30 RX ADMIN — OXALIPLATIN 173 MG: 5 INJECTION, SOLUTION, CONCENTRATE INTRAVENOUS at 15:25

## 2017-10-30 RX ADMIN — DEXAMETHASONE SODIUM PHOSPHATE 150 MG: 10 INJECTION, SOLUTION INTRAMUSCULAR; INTRAVENOUS at 14:57

## 2017-10-30 ASSESSMENT — PAIN SCALES - GENERAL: PAINLEVEL: MODERATE PAIN (4)

## 2017-10-30 NOTE — PROGRESS NOTES
Reuben will be returning to [place] prior to his pump disconnect.  Arranged for disconnect at [facility] through [employer] [phone], orders faxed ([phone]) and contact information/instructions provided to pt.  Patient taught how to flush and disconnect pump in the event that something does not work out with the local hospital, he was able to teach back and demonstrate how to flush and remove the port needle.

## 2017-10-30 NOTE — PATIENT INSTRUCTIONS
October 2017 Sunday Monday Tuesday Wednesday Thursday Friday Saturday   1     LAB    9:00 AM   (15 min.)   [facility] LAB HOME INFUSION   [facility] [place], Laboratory Services 2     [facility] LAB DRAW   11:15 AM   (15 min.)   [facility] LAB DRAW   [facility] Lab Draw     [facility] RETURN   11:45 AM   (30 min.)   Kadi Dee MD   [facility] ONC INFUSION 240   12:30 PM   (240 min.)   [facility] ONCOLOGY INFUSION   [facility] 3     4     5     6     7       8     9     10     11     12     13     14       15     16     17     [facility] LAB DRAW   10:15 AM   (15 min.)    [facility] LAB DRAW   [facility] Lab Draw     [facility] RETURN   10:45 AM   (50 min.)   Katalina Ramirez PA-C   [facility] ONC INFUSION 240   11:30 AM   (240 min.)   [facility] ONCOLOGY INFUSION   [facility] 18     19     20     21       22     23     24     25     26     27     28       29     30     [facility] LAB DRAW   10:15 AM   (15 min.)    M[facility]ON[facility] LAB DRAW   [facility] Lab Draw     CT CHEST/ABDOMEN/PELVIS W   10:25 AM   (20 min.)   UCCT2   [facility] CT     [facility] RETURN   12:45 PM   (30 min.)   Kadi Dee MD   [facility] ONC INFUSION 240    1:30 PM   (240 min.)   [facility] ONCOLOGY INFUSION   [facility] 31     [facility] NEW WITH ROOM    9:45 AM   (60 min.)   Dao Lacy PsyD   [facility]                                November 2017 Sunday Monday Tuesday Wednesday Thursday Friday Saturday                  1     2     3     4       5     6     7     8     9     10     11       12     13     [facility] RETURN ATRIAL FIBRILLATION    3:05 PM   (20 min.)   Romero Guerrero MD   [facility] 14     15     16     17     18       19     20     21     22     23     24     25       26     27     28     29     30                            Lab Results:  Recent  Results (from the past 12 hour(s))   CBC with platelets differential    Collection Time: 10/30/17 10:26 AM   Result Value Ref Range    WBC 4.5 4.0 - 11.0 10e9/L    RBC Count 4.51 4.4 - 5.9 10e12/L    Hemoglobin 14.1 13.3 - 17.7 g/dL    Hematocrit 41.5 40.0 - 53.0 %    MCV 92 78 - 100 fl    MCH 31.3 26.5 - 33.0 pg    MCHC 34.0 31.5 - 36.5 g/dL    RDW 13.5 10.0 - 15.0 %    Platelet Count 89 (L) 150 - 450 10e9/L    Diff Method Automated Method     % Neutrophils 64.4 %    % Lymphocytes 25.8 %    % Monocytes 9.0 %    % Eosinophils 0.4 %    % Basophils 0.2 %    % Immature Granulocytes 0.2 %    Nucleated RBCs 0 0 /100    Absolute Neutrophil 2.9 1.6 - 8.3 10e9/L    Absolute Lymphocytes 1.2 0.8 - 5.3 10e9/L    Absolute Monocytes 0.4 0.0 - 1.3 10e9/L    Absolute Eosinophils 0.0 0.0 - 0.7 10e9/L    Absolute Basophils 0.0 0.0 - 0.2 10e9/L    Abs Immature Granulocytes 0.0 0 - 0.4 10e9/L    Absolute Nucleated RBC 0.0    Comprehensive metabolic panel    Collection Time: 10/30/17 10:26 AM   Result Value Ref Range    Sodium 142 133 - 144 mmol/L    Potassium 3.8 3.4 - 5.3 mmol/L    Chloride 110 (H) 94 - 109 mmol/L    Carbon Dioxide 24 20 - 32 mmol/L    Anion Gap 9 3 - 14 mmol/L    Glucose 104 (H) 70 - 99 mg/dL    Urea Nitrogen 13 7 - 30 mg/dL    Creatinine 0.84 0.66 - 1.25 mg/dL    GFR Estimate >90 >60 mL/min/1.7m2    GFR Estimate If Black >90 >60 mL/min/1.7m2    Calcium 8.5 8.5 - 10.1 mg/dL    Bilirubin Total 0.7 0.2 - 1.3 mg/dL    Albumin 3.4 3.4 - 5.0 g/dL    Protein Total 7.2 6.8 - 8.8 g/dL    Alkaline Phosphatase 118 40 - 150 U/L    ALT 27 0 - 70 U/L    AST 23 0 - 45 U/L     Contact Numbers    [facility] Main Line: [phone]  [facility] Triage:  [phone]    Call triage with chills and/or temperature greater than or equal to 100.5, uncontrolled nausea/vomiting, diarrhea, constipation, dizziness, shortness of breath, chest pain, bleeding, unexplained bruising, or any new/concerning symptoms, questions/concerns.     If you are having any  concerning symptoms or wish to speak to a provider before your next infusion visit, please call your care coordinator or triage to notify them so we can adequately serve you.       After Hours: [phone]    If after hours, weekends, or holidays, call main hospital  and ask for Oncology doctor on call.

## 2017-10-30 NOTE — PROGRESS NOTES
Infusion Nursing Note:  Reuben Padilla presents today for Cycle 12 Day 1 Oxaliplatin, Leucovorin, Fluorouracil push and pump hook on.    Patient seen by provider today: Yes: Kadi Dee MD prior to infusion.    Treatment Conditions:  Lab Results   Component Value Date    HGB 14.1 10/30/2017     Lab Results   Component Value Date    WBC 4.5 10/30/2017      Lab Results   Component Value Date    ANEU 2.9 10/30/2017     Lab Results   Component Value Date    PLT 89 10/30/2017      Lab Results   Component Value Date     10/30/2017                   Lab Results   Component Value Date    POTASSIUM 3.8 10/30/2017           No results found for: MAG         Lab Results   Component Value Date    CR 0.84 10/30/2017                   Lab Results   Component Value Date    NIDHI 8.5 10/30/2017                Lab Results   Component Value Date    BILITOTAL 0.7 10/30/2017           Lab Results   Component Value Date    ALBUMIN 3.4 10/30/2017                    Lab Results   Component Value Date    ALT 27 10/30/2017           Lab Results   Component Value Date    AST 23 10/30/2017     Results reviewed, labs MET treatment parameters, ok to proceed with treatment.        Intravenous Access:  Implanted Port.    Note: Patient has no complaints or concerns. He will be driving to [place] tomorrow and his pump un-hook will be there at a local hospital. Josefina Wetzel RNCC arranged for un-hook and patient instructions were printed for Reuben from Josefina's note and sent home with him. Pump Hook-On at 1745.       Post Infusion Assessment:  Patient tolerated infusion without incident.  Blood return noted during administration every 2 cc during IV push Fluorouracil.  No evidence of extravasations.    Discharge Plan:   Prescription refills given for Dexamethasone, Ambien.  Discharge instructions reviewed with: Patient and Family.  Patient and/or family verbalized understanding of discharge instructions and all questions answered.  Copy  of AVS reviewed with patient and/or family.  Patient will return 10/31/17 for next appointment.  Patient discharged in stable condition accompanied by: sister.  Departure Mode: Ambulatory.    Dora Hoyos RN    Prior to discharge: Port is secured in place with tegaderm and flushed with 10cc NS with positive blood return noted.  Continuous home infusion pump connected.    All connectors secured in place and clamps taped open.    Patient instructed to call our clinic or [facility] with any questions or concerns at home.  Patient verbalized understanding.

## 2017-10-30 NOTE — LETTER
10/30/2017       RE: Reuben Padilla  PO   [facility] [address]     Dear Colleague,    Thank you for referring your patient, Reuben Padilla, to the [facility]. Please see a copy of my visit note below.    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 His tumor is positive for cytokeratin 20, negative for P63 and CK7, and HER2 is negative. He started on FOLFOX (5FU/oxaliplatin) on 5/15/2017. He had a delay in treatment from 9/5-10/2/17 due to work related injury.    INTERVAL HISTORY:   Reuben is here with his sister and son for two week follow-up. He lives six hours away in [place], travels down here for each cycle of chemo. Previously has stayed at the [facility] for about 5 days and is disconnected from the 5-FU pump before traveling back home. This time he plans on leaving tomorrow, with the 5-FU pump still running, and have it disconnected at a clinic near home.     The last cycle went much better from a nausea standpoint with the dexamethasone added for days 2-5. He did have more diarrhea than usual. This was during the second week and lasted for 3-4 days. Controlled with Imodium and stayed hydrated.     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 Able to button his shirt, doesn't affect his functioning. Neuropathy in his feet is better.     No f/c or cough/cold symptoms. Still has pain in the ribs from his traumatic accident a couple months ago, exacerbated when sitting up from lying position. He does not feel he needs any pain medications.     A complete 10-pt ROS is negative other than what is reported above      PHYSICAL EXAMINATION  Pulse 92  Temp 99  F (37.2  C) (Oral)  Resp 16  Wt (!) 159.3 kg (351 lb 4.8 oz)  SpO2 96%  BMI 40.61 kg/m2 BP  130/90  Wt Readings from Last 4 Encounters:   10/30/17 (!) 159.3 kg (351 lb 4.8 oz)   10/17/17 (!) 162.2 kg (357 lb 9.6 oz)   10/02/17 (!) 160.1 kg (353 lb)   09/05/17 (!) 166.1 kg (366 lb 1.6 oz)     Constitutional: Alert, oriented male in no visible distress.  Eyes: PERRL. Anicteric sclerae.  ENT/Mouth: OM moist and pink without lesions or thrush.  CV: RRR  Resp: CTAB throughout  Abdomen: Soft, non-tender, non-distended. Obese. Bowel sounds present. Unable to palpate liver or spleen.   Extremities: No lower extremity edema   Skin: Warm, dry. mild venous stasis changes on LE bilat.  Lymph: No cervical or supraclavicular lymphadenopathy appreciated.   Neuro: CN II-XII grossly intact.    LABS:  Cr 0.8, Na 142, LFTs wnl  WBC 4.5, Hb 14.1, Plts 89    CT cap 10/30/17: stable compared to prior. Reviewed in detail with the patient    IMPRESSION/PLAN:  1. Metastatic esophogeal cancer. On FOLFOX since May 2017, with dose reduction of oxaliplatin due to neuropathy. He had partial response on imaging in September. Treatment was held in September due to a traumatic work-related injury resulting in multiple rib and spine fractures. He resumed chemotherapy on 10/2 and scan today is stable. Tolerating therapy reasonably well and labs are adequate.     Proceed with cycle 12 today. Continue every 2 weeks with BERENICE appt before each cycle.   F/u with Dr. Dee in 8-10 weeks with repeat imaging.     If he has disease progression then Cyramza/Taxol is 2nd line (if neuropathy allows for Taxol). We will need to rebiopsy for PD-L1 testing at some point, since Keytruda is now approved for 3rd line and tissue we have now is not adequate for testing.      2. Nausea. Improved with Emend and Decadron.     3. Incidental PE: Identified on restaging 7/10/17. He continues on Xarelto. Will continue to monitor PLT.    Patient seen and d/w staff Dr. Leila Bennett MD  Heme/Onc Fellow  Pager: [phone]    Pt was seen and evaluated by me with   Donald.  Imaging reveals relatively stable response to his current treatment regimen of FOLFOX.  He is  Tolerating the therapy well with mild grade 1 neuropathy.    We discussed continuing the current course of treatment.      Proceed with FOLFOX x 3-4 more cycles and then reimage.      He remains on Xarelto for incidental PE.    We discussed that he does not have enough tissue for PD 1 testing and may need a new biopsy in the future.  Kadi Dee MD

## 2017-10-30 NOTE — NURSING NOTE
"Oncology Rooming Note    October 30, 2017 1:00 PM   Reuben Padilla is a 57 year old male who presents for:    Chief Complaint   Patient presents with     Blood Draw     labs drawn from port by rn.  vs taken     Oncology Clinic Visit     Follow up-Esophageal CA     Initial Vitals: Pulse 92  Temp 99  F (37.2  C) (Oral)  Resp 16  Wt (!) 159.3 kg (351 lb 4.8 oz)  SpO2 96%  BMI 40.61 kg/m2 Estimated body mass index is 40.61 kg/(m^2) as calculated from the following:    Height as of 10/17/17: 1.981 m (6' 5.99\").    Weight as of this encounter: 159.3 kg (351 lb 4.8 oz). Body surface area is 2.96 meters squared.  Moderate Pain (4) Comment: Ribs   No LMP for male patient.  Allergies reviewed: Yes  Medications reviewed: Yes    Medications: MEDICATION REFILLS NEEDED TODAY. Provider was notified.  Pharmacy name entered into [employer]:    [id number] - [place], [place] - [address] [place] [address] AT INTERSECTION [address] [place], [place] - [address]    Clinical concerns: Refills on Decadron, Edwigeien Dr. Dee was notified.    10 minutes for nursing intake (face to face time)     Kamilah Shi LPN              "

## 2017-10-30 NOTE — MR AVS SNAPSHOT
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)           Follow-ups after your visit        Your next 10 appointments already scheduled     Nov 13, 2017 10:00 AM CST   [facility] Lab Draw with [facility] DRAW   [facility] ([facility])    [address] [place] [address]   [phone]            Nov 13, 2017 10:30 AM CST   Infusion 240 with UC ONCOLOGY INFUSION, UC 22 ATC   [facility] ([facility])    [address] [place] [address]   [phone]            Nov 13, 2017  3:20 PM CST   (Arrive by 3:05 PM)   RETURN ATRIAL FIBULATION VISIT with Romero Guerrero MD   [facility] ([facility])    [address] [place] [id number]   [phone]            Nov 27, 2017 10:30 AM CST   [facility] Lab Draw with [facility] DRAW   [facility]    [address] [place] [address]   [phone]            Nov 27, 2017 11:00 AM CST   (Arrive by 10:45 AM)   Return Visit with Loraine Sutherland PA-C   [facility] ([facility])    [address] [place] [address]   [phone]            Nov 27, 2017 12:00 PM CST   Infusion 240 with UC ONCOLOGY INFUSION, UC 22 ATC   [facility] ([facility])    [address] [place] [address]   [phone]            Dec 11, 2017 11:15 AM CST   [facility] Lab Draw with [facility] LAB DRAW    [facility] Draw ([facility])    [address] [id number] [phone]            Dec 11, 2017 11:50 AM CST   (Arrive by 11:35 AM)   Return Visit with Loraine Sutherland PA-C   [facility] ([facility])    [address] [id number] [phone]              Future tests that were ordered for you today     Open Future Orders        Priority Expected Expires Ordered    CT Chest/Abdomen/Pelvis w Contrast Routine 1/4/2018 10/31/2018 10/31/2017            Who to contact     If you have questions or need follow up information about today's clinic visit or your schedule please contact [facility] directly at [phone].  Normal or non-critical lab and imaging results will be communicated to you by MyChart, letter or phone within 4 business days after the clinic has received the results. If you do not hear from us within 7 days, please contact the clinic through [employer] or phone. If you have a critical or abnormal lab result, we will notify you by phone as soon as possible.  Submit refill requests through [employer] or call your pharmacy and they will forward the refill request to us. Please allow 3 business days for your refill to be completed.          Additional Information About Your Visit        MyChart Information     [employer] gives you secure access to your electronic health record. If you see a primary care provider, you can also send messages to your care team and make appointments. If you have questions, please call your primary care clinic.  If you do not have a primary care provider, please call [phone] and they will assist you.        Care EveryWhere ID     This is your Care EveryWhere ID. This could be used by other organizations to access your [facility] medical records  [id number]        Your Vitals Were     Pulse Temperature Respirations  Pulse Oximetry BMI (Body Mass Index)       92 99  F (37.2  C) (Oral) 16 96% 40.61 kg/m2        Blood Pressure from Last 3 Encounters:   10/17/17 (!) 137/95   10/02/17 (!) 128/92   09/05/17 107/72    Weight from Last 3 Encounters:   10/30/17 (!) 159.3 kg (351 lb 4.8 oz)   10/17/17 (!) 162.2 kg (357 lb 9.6 oz)   10/02/17 (!) 160.1 kg (353 lb)              Today, you had the following     No orders found for display         Today's Medication Changes          These changes are accurate as of: 10/30/17 11:59 PM.  If you have any questions, ask your nurse or doctor.               These medicines have changed or have updated prescriptions.        Dose/Directions    * dexamethasone 4 MG tablet   Commonly known as:  DECADRON   This may have changed:  Another medication with the same name was removed. Continue taking this medication, and follow the directions you see here.   Used for:  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)   Changed by:  Katalina Ramirez PA-C        Take two tablets daily on days 2,3 and then 1 tablet daily days 4,5   Quantity:  6 tablet   Refills:  1       * dexamethasone 4 MG tablet   Commonly known as:  DECADRON   This may have changed:  Another medication with the same name was removed. Continue taking this medication, and follow the directions you see here.   Used for:  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)        Take two tablets daily on days 2,3 and then 1 tablet daily days 4,5   Quantity:  6 tablet   Refills:  1       zolpidem 10 MG tablet   Commonly known as:  AMBIEN   This may have changed:  Another medication with the same name was removed. Continue taking this medication, and follow the directions you see here.   Used for:  Metastasis from gastric cancer (H)   Changed by:  Kadi Dee MD        Dose:  10 mg   Take 1 tablet (10 mg) by mouth nightly as needed   Quantity:  60 tablet   Refills:  1       * Notice:  This list has 2 medication(s) that are the same as other  medications prescribed for you. Read the directions carefully, and ask your doctor or other care provider to review them with you.         Where to get your medicines      These medications were sent to [place], [place] - [address]  [address], [facility] [address]    Hours:  TRANSPLANT PHONE NUMBER [phone] Phone:  [phone]     dexamethasone 4 MG tablet                Primary Care Provider    Physician No Ref-Primary       NO REF-PRIMARY PHYSICIAN        Equal Access to Services     OLLIE SHARPE : Hadii aad ku hadasho Soomaali, waaxda luqadaha, qaybta kaalmada adeegyada, waxay idiin hayaan jacquieeg jeimy cardenas. So [facility] [phone].    ATENCIÓN: Si habla español, tiene a alamo disposición servicios gratuitos de asistencia lingüística. [facility] [phone].    We comply with applicable federal civil rights laws and [place] laws. We do not discriminate on the basis of race, color, national origin, age, disability, sex, sexual orientation, or gender identity.            Thank you!     Thank you for choosing [facility]  for your care. Our goal is always to provide you with excellent care. Hearing back from our patients is one way we can continue to improve our services. Please take a few minutes to complete the written survey that you may receive in the mail after your visit with us. Thank you!             Your Updated Medication List - Protect others around you: Learn how to safely use, store and throw away your medicines at www.disposemymeds.org.          This list is accurate as of: 10/30/17 11:59 PM.  Always use your most recent med list.                   Brand Name Dispense Instructions for use Diagnosis    cyclobenzaprine 10 MG tablet    FLEXERIL     Take 10 mg by mouth        * dexamethasone 4 MG tablet    DECADRON    6 tablet    Take two tablets daily on days 2,3 and then 1 tablet daily days 4,5    Cancer of  distal third of esophagus (H)       * dexamethasone 4 MG tablet    DECADRON    6 tablet    Take two tablets daily on days 2,3 and then 1 tablet daily days 4,5    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)       fish oil-omega-3 fatty acids 1000 MG capsule      Take 2 g by mouth daily        IBUPROFEN PO      Take 800 mg by mouth every 8 hours as needed for moderate pain        latanoprost 0.005 % ophthalmic solution    XALATAN     Place into both eyes At Bedtime    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  Remove after 12 hours.    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       Multi-vitamin Tabs tablet      Take 1 tablet by mouth daily        ondansetron 8 MG tablet    ZOFRAN    30 tablet    Take 1 tablet (8 mg) by mouth every 8 hours as needed (Nausea/Vomiting)    Cancer of distal third of esophagus (H)       prochlorperazine 10 MG tablet    COMPAZINE    30 tablet    Take 1 tablet (10 mg) by mouth every 6 hours as needed (Nausea/Vomiting)    Cancer of distal third of esophagus (H)       rivaroxaban ANTICOAGULANT 20 MG Tabs tablet    XARELTO    90 tablet    Take 1 tablet (20 mg) by mouth daily (with dinner)    Persistent atrial fibrillation (H), Other pulmonary embolism without acute cor pulmonale, unspecified chronicity (H), Cancer of distal third of esophagus (H)       timolol 0.5 % ophthalmic solution    TIMOPTIC     Place into both eyes daily    Cancer of distal third of esophagus (H)        zolpidem 10 MG tablet    AMBIEN    60 tablet    Take 1 tablet (10 mg) by mouth nightly as needed    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 Read the directions carefully, and ask your doctor or other care provider to review them with you.

## 2017-10-30 NOTE — MR AVS SNAPSHOT
After Visit Summary   10/30/2017    Reuben Padilla    MRN: [id number]           Patient Information     Date Of Birth          1960        Visit Information        Provider Department      10/30/2017 1:30 PM UC 32 ATC; UC ONCOLOGY INFUSION [facility]        Today's Diagnoses     Cancer of distal third of esophagus (H)    -  1      Care Instructions          October 2017 Sunday Monday Tuesday Wednesday Thursday Friday Saturday   1     LAB    9:00 AM   (15 min.)   UR LAB HOME INFUSION   [facility] [place], Laboratory Services 2     [facility] LAB DRAW   11:15 AM   (15 min.)    MASONIC LAB DRAW   [facility] Lab Draw     UMP RETURN   11:45 AM   (30 min.)   Kadi Dee MD   [facility] ONC INFUSION 240   12:30 PM   (240 min.)   UC ONCOLOGY INFUSION   [facility] 3     4     5     6     7       8     9     10     11     12     13     14       15     16     17     P MASONIC LAB DRAW   10:15 AM   (15 min.)    MASONIC LAB DRAW   [facility] Lab Draw     UMP RETURN   10:45 AM   (50 min.)   Katalina Ramirez PA-C   [facility] ONC INFUSION 240   11:30 AM   (240 min.)   UC ONCOLOGY INFUSION   [facility] 18     19     20     21       22     23     24     25     26     27     28       29     30     [facility] LAB DRAW   10:15 AM   (15 min.)    MASONIC LAB DRAW   [facility] Lab Draw     CT CHEST/ABDOMEN/PELVIS W   10:25 AM   (20 min.)   UCCT2   [facility] CT     UMP RETURN   12:45 PM   (30 min.)   Kadi Dee MD   [facility] ONC INFUSION 240    1:30 PM   (240 min.)   UC ONCOLOGY INFUSION   [facility] Cancer [facility] 31     P NEW WITH ROOM    9:45 AM   (60 min.)   Dao Lacy PsyD   [facility]                                November 2017 Sunday Monday Tuesday Wednesday  Thursday Friday Saturday                  1     2     3     4       5     6     7     8     9     10     11       12     13     UMP RETURN ATRIAL FIBRILLATION    3:05 PM   (20 min.)   Romero Guerrero MD   [facility] 14     15     16     17     18       19     20     21     22     23     24     25       26     27     28     29     30                            Lab Results:  Recent Results (from the past 12 hour(s))   CBC with platelets differential    Collection Time: 10/30/17 10:26 AM   Result Value Ref Range    WBC 4.5 4.0 - 11.0 10e9/L    RBC Count 4.51 4.4 - 5.9 10e12/L    Hemoglobin 14.1 13.3 - 17.7 g/dL    Hematocrit 41.5 40.0 - 53.0 %    MCV 92 78 - 100 fl    MCH 31.3 26.5 - 33.0 pg    MCHC 34.0 31.5 - 36.5 g/dL    RDW 13.5 10.0 - 15.0 %    Platelet Count 89 (L) 150 - 450 10e9/L    Diff Method Automated Method     % Neutrophils 64.4 %    % Lymphocytes 25.8 %    % Monocytes 9.0 %    % Eosinophils 0.4 %    % Basophils 0.2 %    % Immature Granulocytes 0.2 %    Nucleated RBCs 0 0 /100    Absolute Neutrophil 2.9 1.6 - 8.3 10e9/L    Absolute Lymphocytes 1.2 0.8 - 5.3 10e9/L    Absolute Monocytes 0.4 0.0 - 1.3 10e9/L    Absolute Eosinophils 0.0 0.0 - 0.7 10e9/L    Absolute Basophils 0.0 0.0 - 0.2 10e9/L    Abs Immature Granulocytes 0.0 0 - 0.4 10e9/L    Absolute Nucleated RBC 0.0    Comprehensive metabolic panel    Collection Time: 10/30/17 10:26 AM   Result Value Ref Range    Sodium 142 133 - 144 mmol/L    Potassium 3.8 3.4 - 5.3 mmol/L    Chloride 110 (H) 94 - 109 mmol/L    Carbon Dioxide 24 20 - 32 mmol/L    Anion Gap 9 3 - 14 mmol/L    Glucose 104 (H) 70 - 99 mg/dL    Urea Nitrogen 13 7 - 30 mg/dL    Creatinine 0.84 0.66 - 1.25 mg/dL    GFR Estimate >90 >60 mL/min/1.7m2    GFR Estimate If Black >90 >60 mL/min/1.7m2    Calcium 8.5 8.5 - 10.1 mg/dL    Bilirubin Total 0.7 0.2 - 1.3 mg/dL    Albumin 3.4 3.4 - 5.0 g/dL    Protein Total 7.2 6.8 - 8.8 g/dL    Alkaline Phosphatase 118 40 - 150 U/L    ALT  27 0 - 70 U/L    AST 23 0 - 45 U/L     Contact Numbers    [facility] Main Line: [phone]  [facility] Triage:  [phone]    Call triage with chills and/or temperature greater than or equal to 100.5, uncontrolled nausea/vomiting, diarrhea, constipation, dizziness, shortness of breath, chest pain, bleeding, unexplained bruising, or any new/concerning symptoms, questions/concerns.     If you are having any concerning symptoms or wish to speak to a provider before your next infusion visit, please call your care coordinator or triage to notify them so we can adequately serve you.       After Hours: [phone]    If after hours, weekends, or holidays, call main hospital  and ask for Oncology doctor on call.             Follow-ups after your visit        Your next 10 appointments already scheduled     Oct 31, 2017 10:00 AM CDT   (Arrive by 9:45 AM)   NEW WITH ROOM with Dao Lacy PsyD,  2 114 CONSULT [facility] ([facility])    [address] [place] [address] [phone]            Nov 13, 2017  3:20 PM CST   (Arrive by 3:05 PM)   RETURN ATRIAL FIBULATION VISIT with Romero Guerrero MD   [facility] [place])    [address] [place] [address] [phone]              Who to contact     If you have questions or need follow up information about today's clinic visit or your schedule please contact [facility] directly at [phone].  Normal or non-critical lab and imaging results will be communicated to you by MyChart, letter or phone within 4 business days after the clinic has received the results. If you do not hear from us within 7 days, please contact the clinic through MyChart or phone. If you have a critical or abnormal lab result, we will notify you by phone as soon as possible.  Submit refill requests through [employer] or call your pharmacy  and they will forward the refill request to us. Please allow 3 business days for your refill to be completed.          Additional Information About Your Visit        [employer] Information     [facility] gives you secure access to your electronic health record. If you see a primary care provider, you can also send messages to your care team and make appointments. If you have questions, please call your primary care clinic.  If you do not have a primary care provider, please call [phone] and they will assist you.        Care EveryWhere ID     This is your Care EveryWhere ID. This could be used by other organizations to access your [place] medical records  [id number]         Blood Pressure from Last 3 Encounters:   10/17/17 (!) 137/95   10/02/17 (!) 128/92   09/05/17 107/72    Weight from Last 3 Encounters:   10/30/17 (!) 159.3 kg (351 lb 4.8 oz)   10/17/17 (!) 162.2 kg (357 lb 9.6 oz)   10/02/17 (!) 160.1 kg (353 lb)              We Performed the Following     CBC with platelets differential     Comprehensive metabolic panel          Today's Medication Changes          These changes are accurate as of: 10/30/17  2:25 PM.  If you have any questions, ask your nurse or doctor.               These medicines have changed or have updated prescriptions.        Dose/Directions    * dexamethasone 4 MG tablet   Commonly known as:  DECADRON   This may have changed:  Another medication with the same name was removed. Continue taking this medication, and follow the directions you see here.   Used for:  Cancer of distal third of esophagus (H)   Changed by:  Katalina Ramirez PA-C        Take two tablets daily on days 2,3 and then 1 tablet daily days 4,5   Quantity:  6 tablet   Refills:  1       * dexamethasone 4 MG tablet   Commonly known as:  DECADRON   This may have changed:  Another medication with the same name was removed. Continue taking this medication, and follow the directions you see here.   Used for:  Cancer  of distal third of esophagus (H)        Take two tablets daily on days 2,3 and then 1 tablet daily days 4,5   Quantity:  6 tablet   Refills:  1       zolpidem 10 MG tablet   Commonly known as:  AMBIEN   This may have changed:  Another medication with the same name was removed. Continue taking this medication, and follow the directions you see here.   Used for:  Metastasis from gastric cancer (H)   Changed by:  Kadi Dee MD        Dose:  10 mg   Take 1 tablet (10 mg) by mouth nightly as needed   Quantity:  60 tablet   Refills:  1       * Notice:  This list has 2 medication(s) that are the same as other medications prescribed for you. Read the directions carefully, and ask your doctor or other care provider to review them with you.         Where to get your medicines      These medications were sent to [facility] [address] [place] [address] [address] [place] [id number]    Hours:  TRANSPLANT PHONE NUMBER [phone] Phone:  [phone]     dexamethasone 4 MG tablet                Primary Care Provider    Physician No Ref-Primary       NO REF-PRIMARY PHYSICIAN        Equal Access to Services     [facility] Center[facility] : Hadii antonio navarroo Sonigel, waaxda luqadaha, qaybta kaalmada ashleigh, geraldo washington . So [facility] [phone].    ATENCIÓN: Si habla español, tiene a alamo disposición servicios gratuitos de asistencia lingüística. Llame al [phone].    We comply with applicable federal civil rights laws and [place] laws. We do not discriminate on the basis of race, color, national origin, age, disability, sex, sexual orientation, or gender identity.            Thank you!     Thank you for choosing [facility] CANCER [facility]  for your care. Our goal is always to provide you with excellent care. Hearing back from our patients is one way we can continue to improve our services. Please take a few minutes to complete the  written survey that you may receive in the mail after your visit with us. Thank you!             Your Updated Medication List - Protect others around you: Learn how to safely use, store and throw away your medicines at www.disposemymeds.org.          This list is accurate as of: 10/30/17  2:25 PM.  Always use your most recent med list.                   Brand Name Dispense Instructions for use Diagnosis    cyclobenzaprine 10 MG tablet    FLEXERIL     Take 10 mg by mouth        * dexamethasone 4 MG tablet    DECADRON    6 tablet    Take two tablets daily on days 2,3 and then 1 tablet daily days 4,5    Cancer of distal third of esophagus (H)       * dexamethasone 4 MG tablet    DECADRON    6 tablet    Take two tablets daily on days 2,3 and then 1 tablet daily days 4,5    Cancer of distal third of esophagus (H)       fish oil-omega-3 fatty acids 1000 MG capsule      Take 2 g by mouth daily        IBUPROFEN PO      Take 800 mg by mouth every 8 hours as needed for moderate pain        latanoprost 0.005 % ophthalmic solution    XALATAN     Place into both eyes At Bedtime    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  Remove after 12 hours.    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       Multi-vitamin Tabs tablet      Take 1 tablet by mouth daily        ondansetron 8 MG tablet    ZOFRAN    30 tablet    Take 1 tablet (8 mg) by mouth every 8 hours as needed (Nausea/Vomiting)    Cancer of distal third of  esophagus (H)       prochlorperazine 10 MG tablet    COMPAZINE    30 tablet    Take 1 tablet (10 mg) by mouth every 6 hours as needed (Nausea/Vomiting)    Cancer of distal third of esophagus (H)       rivaroxaban ANTICOAGULANT 20 MG Tabs tablet    XARELTO    90 tablet    Take 1 tablet (20 mg) by mouth daily (with dinner)    Persistent atrial fibrillation (H), Other pulmonary embolism without acute cor pulmonale, unspecified chronicity (H), Cancer of distal third of esophagus (H)       timolol 0.5 % ophthalmic solution    TIMOPTIC     Place into both eyes daily    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 Read the directions carefully, and ask your doctor or other care provider to review them with you.

## 2017-10-30 NOTE — NURSING NOTE
"Chief Complaint   Patient presents with     Blood Draw     labs drawn from port by rn.  vs taken     Port accessed with 20 gauge 3/4\" Power needle and labs drawn by rn.  Port flushed with NS and heparin.  Pt tolerated well.  VS taken.  Faye Solano RN      "

## 2017-10-30 NOTE — PATIENT INSTRUCTIONS
Reuben Padilla   60    Orders for [facility]    Disconnect Chemo infusion pump at completion of infusion  Flush tubing with 20 ml 0.9% Normal Saline using the push pause method  After saline, flush tubing with 5ml of 100u Heparin  Deaccess port needle and cover site with Band-Aid or gauze.    HONORIO Dee MD/ TONNY Bain RN       Please call Marlena at [facility] ([phone]) on Tuesday 10/31 to arrange disconnect time. If Marlena is not available please speak with any nurse at the nursing station. They are aware and have orders.

## 2017-10-31 ENCOUNTER — OFFICE VISIT (OUTPATIENT)
Dept: ONCOLOGY | Facility: CLINIC | Age: 57
End: 2017-10-31
Attending: PSYCHOLOGIST
Payer: COMMERCIAL

## 2017-10-31 DIAGNOSIS — F43.21 ADJUSTMENT DISORDER WITH DEPRESSED MOOD: Primary | ICD-10-CM

## 2017-10-31 DIAGNOSIS — C15.5 CANCER OF DISTAL THIRD OF ESOPHAGUS (H): Primary | ICD-10-CM

## 2017-10-31 PROCEDURE — 40000114 ZZH STATISTIC NO CHARGE CLINIC VISIT

## 2017-10-31 PROCEDURE — 90791 PSYCH DIAGNOSTIC EVALUATION: CPT | Mod: ZP | Performed by: PSYCHOLOGIST

## 2017-10-31 NOTE — MR AVS SNAPSHOT
After Visit Summary   10/31/2017    Reuben Padilla    MRN: [id number]           Patient Information     Date Of Birth          1960        Visit Information        Provider Department      10/31/2017 10:00 AM Dao Lacy PsyD;  2 114 CONSULT [facility]        Today's Diagnoses     Adjustment disorder with depressed mood    -  1       Follow-ups after your visit        Your next 10 appointments already scheduled     Nov 13, 2017 10:00 AM CST   [facility] Lab Draw with UC [facility]ONIC LAB DRAW   [facility] Lab Draw ([facility])    [address] [place] [id number]   [phone]            Nov 13, 2017 10:30 AM CST   Infusion 240 with UC ONCOLOGY INFUSION, UC 22 ATC   [facility] ([facility])    [address] [place] [address]   [phone]            Nov 13, 2017  3:20 PM CST   (Arrive by 3:05 PM)   RETURN ATRIAL FIBULATION VISIT with Romero Guerrero MD   [facility] ([facility])    [address] [place] [address]   [phone]            Nov 27, 2017 10:30 AM CST   [facility]ic Lab Draw with UC MASONIC LAB DRAW   [facility] Lab Draw ([facility])    [address] [place] [address]   [phone]            Nov 27, 2017 11:00 AM CST   (Arrive by 10:45 AM)   Return Visit with Loraine Sutherland PA-C   [facility] ([facility])    [address] [place] [address]   [phone]            Nov 27, 2017 12:00 PM CST   Infusion 240 with UC ONCOLOGY INFUSION, UC 22 ATC   [facility] ([facility])    [address] [place] [address]   [phone]            Dec 11, 2017 11:15 AM CST   [facility] Lab Draw  with [facility] LAB DRAW   [facility] Lab Draw ([facility])    [address] [place] [facility] [address]  [address] [facility] [address] [phone]            Dec 11, 2017 11:50 AM CST   (Arrive by 11:35 AM)   Return Visit with Loraine Sutherland PA-C   [facility] ([facility])    [address] [place]  [address] [facility] [address] [phone]              Future tests that were ordered for you today     Open Future Orders        Priority Expected Expires Ordered    CT Chest/Abdomen/Pelvis w Contrast Routine 1/4/2018 10/31/2018 10/31/2017            Who to contact     If you have questions or need follow up information about today's clinic visit or your schedule please contact [facility] directly at [phone].  Normal or non-critical lab and imaging results will be communicated to you by [employer], letter or phone within 4 business days after the clinic has received the results. If you do not hear from us within 7 days, please contact the clinic through [employer] or phone. If you have a critical or abnormal lab result, we will notify you by phone as soon as possible.  Submit refill requests through [employer] or call your pharmacy and they will forward the refill request to us. Please allow 3 business days for your refill to be completed.          Additional Information About Your Visit        MyChart Information     [employer] gives you secure access to your electronic health record. If you see a primary care provider, you can also send messages to your care team and make appointments. If you have questions, please call your primary care clinic.  If you do not have a primary care provider, please call [phone] and they will assist you.        Care EveryWhere ID     This is your Care EveryWhere ID. This could be used by other organizations to access your [place] medical records  [id number]         Blood Pressure from Last 3  Encounters:   10/17/17 (!) 137/95   10/02/17 (!) 128/92   09/05/17 107/72    Weight from Last 3 Encounters:   10/30/17 (!) 159.3 kg (351 lb 4.8 oz)   10/17/17 (!) 162.2 kg (357 lb 9.6 oz)   10/02/17 (!) 160.1 kg (353 lb)              Today, you had the following     No orders found for display       Primary Care Provider    Physician No Ref-Primary       NO REF-PRIMARY PHYSICIAN        Equal Access to Services     OLLIE SHARPE : Hadii aad ku hadasho Soomaali, waaxda luqadaha, qaybta kaalmada adeegyajuancarlos, geraldo washington . So [facility] [phone].    ATENCIÓN: Si habla español, tiene a alamo disposición servicios gratuitos de asistencia lingüística. Llame al [phone].    We comply with applicable federal civil rights laws and [place] laws. We do not discriminate on the basis of race, color, national origin, age, disability, sex, sexual orientation, or gender identity.            Thank you!     Thank you for choosing [facility]CER CLINIC  for your care. Our goal is always to provide you with excellent care. Hearing back from our patients is one way we can continue to improve our services. Please take a few minutes to complete the written survey that you may receive in the mail after your visit with us. Thank you!             Your Updated Medication List - Protect others around you: Learn how to safely use, store and throw away your medicines at www.disposemymeds.org.          This list is accurate as of: 10/31/17  9:21 PM.  Always use your most recent med list.                   Brand Name Dispense Instructions for use Diagnosis    cyclobenzaprine 10 MG tablet    FLEXERIL     Take 10 mg by mouth        * dexamethasone 4 MG tablet    DECADRON    6 tablet    Take two tablets daily on days 2,3 and then 1 tablet daily days 4,5    Cancer of distal third of esophagus (H)       * dexamethasone 4 MG tablet    DECADRON    6 tablet    Take two tablets daily on days 2,3 and then 1 tablet daily  days 4,5    Cancer of distal third of esophagus (H)       fish oil-omega-3 fatty acids 1000 MG capsule      Take 2 g by mouth daily        IBUPROFEN PO      Take 800 mg by mouth every 8 hours as needed for moderate pain        latanoprost 0.005 % ophthalmic solution    XALATAN     Place into both eyes At Bedtime    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  Remove after 12 hours.    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       Multi-vitamin Tabs tablet      Take 1 tablet by mouth daily        ondansetron 8 MG tablet    ZOFRAN    30 tablet    Take 1 tablet (8 mg) by mouth every 8 hours as needed (Nausea/Vomiting)    Cancer of distal third of esophagus (H)       prochlorperazine 10 MG tablet    COMPAZINE    30 tablet    Take 1 tablet (10 mg) by mouth every 6 hours as needed (Nausea/Vomiting)    Cancer of distal third of esophagus (H)       rivaroxaban ANTICOAGULANT 20 MG Tabs tablet    XARELTO    90 tablet    Take 1 tablet (20 mg) by mouth daily (with dinner)    Persistent atrial fibrillation (H), Other pulmonary embolism without acute cor pulmonale, unspecified chronicity (H), Cancer of distal third of esophagus (H)       timolol 0.5 % ophthalmic solution    TIMOPTIC     Place into both eyes daily    Cancer of distal third of esophagus (H)       zolpidem 10 MG tablet    AMBIEN    60 tablet    Take 1 tablet (10 mg) by mouth nightly as needed    Metastasis from gastric cancer (H)       * Notice:  This  list has 2 medication(s) that are the same as other medications prescribed for you. Read the directions carefully, and ask your doctor or other care provider to review them with you.

## 2017-10-31 NOTE — LETTER
10/31/2017       RE: Reuben Padilla  PO   [facility] [address]     Dear Colleague,    Thank you for referring your patient, Reuben Padilla, to the [facility]. Please see a copy of my visit note below.    Confidential Summary of Oncology Psychology Initial Visit    Reuben Padilla is a 57 year old male who was referred by Dr. Dee for  Depressed mood secondary to his cancer. The patient was seen for an initial 45 minute evaluation on 10/31/2017.    Presenting Concerns: He reported symptoms of sleep changes, frustration, sadness, concerns about his family, and sadness related to his prognosis. He understands his diagnosis and its treatment. At this time, he maintains stoicism which is generally easy because his physical side-effects are very well managed. His stoicism breaks down when he begins to discuss his prognosis and his fear of his cancer ending his life. When discussing these issues, he becomes tearful and had difficulty expressing himself.     Mental Status/Interview:   Orientation: Reuben Padilla was alert, attentive, and oriented to time, place, and person  Affect: Affect was appropriate to the situation and showed normal range and stability.  Speech: Speech was clear with normal fluency, rate, tone, and volume.  Memory: Although not formally assessed, immediate, recent, and remote memory appeared to be intact.   Insight and Judgement: Reuben Padilla demonstrates adequate awareness of the issues discussed and was able to come to reasonable conclusions.  Thought: Thought patterns were coherent and logical. Hallucinations were denied and delusions were not evident.   Lethality: Reuben Padilla denied suicidal or homicidal ideation or intention.       Impression: Overall, he is doing well managing his illness, his stressors, and his family. 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 He happily reports that he has few physical  side-effects from treatment which helps him maintain his mood. His depressed feelings do expose themselves when he begins to discuss his longevity. He was encouraged to continue to use his stoicism and return for additional appointments if his mood changes.     Diagnosis:   Encounter Diagnosis   Name Primary?     Adjustment disorder with depressed mood Yes     Recommendations/Plan:  1. Continue to use his emotional strengths to manage his cancer treatments.  2. Return for additional appointments as needed.     Thank you for this opportunity to participate in your care of this patient.    Esperanza Spangler.KATALINA., L.P.  Director, Oncology Supportive Care     Again, thank you for allowing me to participate in the care of your patient.      Sincerely,    Dao Lacy PsyD

## 2017-11-01 NOTE — PROGRESS NOTES
Confidential Summary of Oncology Psychology Initial Visit    Reuben Padilla is a 57 year old male who was referred by Dr. Dee for  Depressed mood secondary to his cancer. The patient was seen for an initial 45 minute evaluation on 10/31/2017.    Presenting Concerns: He reported symptoms of sleep changes, frustration, sadness, concerns about his family, and sadness related to his prognosis. He understands his diagnosis and its treatment. At this time, he maintains stoicism which is generally easy because his physical side-effects are very well managed. His stoicism breaks down when he begins to discuss his prognosis and his fear of his cancer ending his life. When discussing these issues, he becomes tearful and had difficulty expressing himself.     Mental Status/Interview:   Orientation: Reuben Padilla was alert, attentive, and oriented to time, place, and person  Affect: Affect was appropriate to the situation and showed normal range and stability.  Speech: Speech was clear with normal fluency, rate, tone, and volume.  Memory: Although not formally assessed, immediate, recent, and remote memory appeared to be intact.   Insight and Judgement: Reuben Padilla demonstrates adequate awareness of the issues discussed and was able to come to reasonable conclusions.  Thought: Thought patterns were coherent and logical. Hallucinations were denied and delusions were not evident.   Lethality: Reuben Padilla denied suicidal or homicidal ideation or intention.       Impression: Overall, he is doing well managing his illness, his stressors, and his family. 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 He happily reports that he has few physical side-effects from treatment which helps him maintain his mood. His depressed feelings do expose themselves when he begins to discuss his longevity. He was encouraged to continue to use his stoicism and return for additional appointments if  his mood changes.     Diagnosis:   Encounter Diagnosis   Name Primary?     Adjustment disorder with depressed mood Yes     Recommendations/Plan:  1. Continue to use his emotional strengths to manage his cancer treatments.  2. Return for additional appointments as needed.     Thank you for this opportunity to participate in your care of this patient.    Dao Lacy Psy.D., L.P.  Director, Oncology Supportive Care

## 2017-11-03 NOTE — PROGRESS NOTES
This is a recent snapshot of the patient's [facility] medical record.  For current drug dose and complete information and questions, call [phone] or In Basket pool, fv home infusion ([id number])  CSN Number:  [id number]

## 2017-11-06 ENCOUNTER — CARE COORDINATION (OUTPATIENT)
Dept: ONCOLOGY | Facility: CLINIC | Age: 57
End: 2017-11-06

## 2017-11-06 NOTE — PROGRESS NOTES
Spoke to Reuben who is upset with the scheduling.  He was told that he would be seeing a specific BERENICE each time he has an infusion and each doctor has their own BERENICE.  Let him know that there are not enough BERENIEC's for that.  He also wanted to know why he isn't seeing someone tomorrow.  Let him know that since he wants continuity of care, Katalina isn't available on 11/13.  Let him know that Citlali Goldberg has an 8:40 appointment, but he refused stating that he doesn't know her and she doesn't know his story.    He is also wanting a call or MyChart message from  as to why she is not seeing him every other treatment like she used to.  Per the last clinic visit on 10/30/17,  would like to see him in 8-10 weeks for restaging.  He states that no one ever told him that.  Let him know that I would get a message out to her and call him back when I heard back.  He stated that he wants to hear from her.

## 2017-11-08 RX ORDER — ALBUTEROL SULFATE 0.83 MG/ML
2.5 SOLUTION RESPIRATORY (INHALATION)
Status: CANCELLED | OUTPATIENT
Start: 2017-11-13

## 2017-11-08 RX ORDER — LORAZEPAM 2 MG/ML
0.5 INJECTION INTRAMUSCULAR EVERY 4 HOURS PRN
Status: CANCELLED
Start: 2017-11-13

## 2017-11-08 RX ORDER — EPINEPHRINE 0.3 MG/.3ML
0.3 INJECTION SUBCUTANEOUS EVERY 5 MIN PRN
Status: CANCELLED | OUTPATIENT
Start: 2017-11-13

## 2017-11-08 RX ORDER — METHYLPREDNISOLONE SODIUM SUCCINATE 125 MG/2ML
125 INJECTION, POWDER, LYOPHILIZED, FOR SOLUTION INTRAMUSCULAR; INTRAVENOUS
Status: CANCELLED
Start: 2017-11-13

## 2017-11-08 RX ORDER — MEPERIDINE HYDROCHLORIDE 25 MG/ML
25 INJECTION INTRAMUSCULAR; INTRAVENOUS; SUBCUTANEOUS EVERY 30 MIN PRN
Status: CANCELLED | OUTPATIENT
Start: 2017-11-13

## 2017-11-08 RX ORDER — PALONOSETRON 0.05 MG/ML
0.25 INJECTION, SOLUTION INTRAVENOUS ONCE
Status: CANCELLED | OUTPATIENT
Start: 2017-11-13

## 2017-11-08 RX ORDER — DIPHENHYDRAMINE HYDROCHLORIDE 50 MG/ML
50 INJECTION INTRAMUSCULAR; INTRAVENOUS
Status: CANCELLED
Start: 2017-11-13

## 2017-11-08 RX ORDER — FLUOROURACIL 50 MG/ML
400 INJECTION, SOLUTION INTRAVENOUS ONCE
Status: CANCELLED | OUTPATIENT
Start: 2017-11-13

## 2017-11-08 RX ORDER — ALBUTEROL SULFATE 90 UG/1
1-2 AEROSOL, METERED RESPIRATORY (INHALATION)
Status: CANCELLED
Start: 2017-11-13

## 2017-11-08 RX ORDER — SODIUM CHLORIDE 9 MG/ML
1000 INJECTION, SOLUTION INTRAVENOUS CONTINUOUS PRN
Status: CANCELLED
Start: 2017-11-13

## 2017-11-08 RX ORDER — EPINEPHRINE 1 MG/ML
0.3 INJECTION, SOLUTION, CONCENTRATE INTRAVENOUS EVERY 5 MIN PRN
Status: CANCELLED | OUTPATIENT
Start: 2017-11-13

## 2017-11-13 ENCOUNTER — INFUSION THERAPY VISIT (OUTPATIENT)
Dept: ONCOLOGY | Facility: CLINIC | Age: 57
End: 2017-11-13
Attending: INTERNAL MEDICINE
Payer: COMMERCIAL

## 2017-11-13 ENCOUNTER — APPOINTMENT (OUTPATIENT)
Dept: LAB | Facility: CLINIC | Age: 57
End: 2017-11-13
Attending: INTERNAL MEDICINE
Payer: COMMERCIAL

## 2017-11-13 ENCOUNTER — HOME INFUSION (PRE-WILLOW HOME INFUSION) (OUTPATIENT)
Dept: PHARMACY | Facility: CLINIC | Age: 57
End: 2017-11-13

## 2017-11-13 VITALS
HEART RATE: 101 BPM | RESPIRATION RATE: 16 BRPM | DIASTOLIC BLOOD PRESSURE: 82 MMHG | BODY MASS INDEX: 41.75 KG/M2 | WEIGHT: 315 LBS | TEMPERATURE: 98.1 F | SYSTOLIC BLOOD PRESSURE: 134 MMHG | OXYGEN SATURATION: 97 %

## 2017-11-13 DIAGNOSIS — C15.5 CANCER OF DISTAL THIRD OF ESOPHAGUS (H): Primary | ICD-10-CM

## 2017-11-13 LAB
ALBUMIN SERPL-MCNC: 3.1 G/DL (ref 3.4–5)
ALP SERPL-CCNC: 108 U/L (ref 40–150)
ALT SERPL W P-5'-P-CCNC: 30 U/L (ref 0–70)
ANION GAP SERPL CALCULATED.3IONS-SCNC: 5 MMOL/L (ref 3–14)
AST SERPL W P-5'-P-CCNC: 26 U/L (ref 0–45)
BASOPHILS # BLD AUTO: 0 10E9/L (ref 0–0.2)
BASOPHILS NFR BLD AUTO: 0.6 %
BILIRUB SERPL-MCNC: 0.5 MG/DL (ref 0.2–1.3)
BUN SERPL-MCNC: 13 MG/DL (ref 7–30)
CALCIUM SERPL-MCNC: 8.8 MG/DL (ref 8.5–10.1)
CHLORIDE SERPL-SCNC: 109 MMOL/L (ref 94–109)
CO2 SERPL-SCNC: 26 MMOL/L (ref 20–32)
CREAT SERPL-MCNC: 0.91 MG/DL (ref 0.66–1.25)
DIFFERENTIAL METHOD BLD: ABNORMAL
EOSINOPHIL # BLD AUTO: 0 10E9/L (ref 0–0.7)
EOSINOPHIL NFR BLD AUTO: 0.3 %
ERYTHROCYTE [DISTWIDTH] IN BLOOD BY AUTOMATED COUNT: 14 % (ref 10–15)
GFR SERPL CREATININE-BSD FRML MDRD: 85 ML/MIN/1.7M2
GLUCOSE SERPL-MCNC: 99 MG/DL (ref 70–99)
HCT VFR BLD AUTO: 37.7 % (ref 40–53)
HGB BLD-MCNC: 12.8 G/DL (ref 13.3–17.7)
IMM GRANULOCYTES # BLD: 0 10E9/L (ref 0–0.4)
IMM GRANULOCYTES NFR BLD: 0.3 %
LYMPHOCYTES # BLD AUTO: 0.8 10E9/L (ref 0.8–5.3)
LYMPHOCYTES NFR BLD AUTO: 23.2 %
MCH RBC QN AUTO: 31.4 PG (ref 26.5–33)
MCHC RBC AUTO-ENTMCNC: 34 G/DL (ref 31.5–36.5)
MCV RBC AUTO: 92 FL (ref 78–100)
MONOCYTES # BLD AUTO: 0.4 10E9/L (ref 0–1.3)
MONOCYTES NFR BLD AUTO: 10.9 %
NEUTROPHILS # BLD AUTO: 2.2 10E9/L (ref 1.6–8.3)
NEUTROPHILS NFR BLD AUTO: 64.7 %
NRBC # BLD AUTO: 0 10*3/UL
NRBC BLD AUTO-RTO: 0 /100
PLATELET # BLD AUTO: 74 10E9/L (ref 150–450)
POTASSIUM SERPL-SCNC: 4.1 MMOL/L (ref 3.4–5.3)
PROT SERPL-MCNC: 6.6 G/DL (ref 6.8–8.8)
RBC # BLD AUTO: 4.08 10E12/L (ref 4.4–5.9)
SODIUM SERPL-SCNC: 140 MMOL/L (ref 133–144)
WBC # BLD AUTO: 3.4 10E9/L (ref 4–11)

## 2017-11-13 PROCEDURE — 96411 CHEMO IV PUSH ADDL DRUG: CPT

## 2017-11-13 PROCEDURE — 96368 THER/DIAG CONCURRENT INF: CPT

## 2017-11-13 PROCEDURE — 96367 TX/PROPH/DG ADDL SEQ IV INF: CPT

## 2017-11-13 PROCEDURE — 80053 COMPREHEN METABOLIC PANEL: CPT | Performed by: INTERNAL MEDICINE

## 2017-11-13 PROCEDURE — 96416 CHEMO PROLONG INFUSE W/PUMP: CPT

## 2017-11-13 PROCEDURE — 25000128 H RX IP 250 OP 636: Mod: ZF | Performed by: INTERNAL MEDICINE

## 2017-11-13 PROCEDURE — 85025 COMPLETE CBC W/AUTO DIFF WBC: CPT | Performed by: INTERNAL MEDICINE

## 2017-11-13 PROCEDURE — 96375 TX/PRO/DX INJ NEW DRUG ADDON: CPT

## 2017-11-13 PROCEDURE — 96413 CHEMO IV INFUSION 1 HR: CPT

## 2017-11-13 PROCEDURE — 96415 CHEMO IV INFUSION ADDL HR: CPT

## 2017-11-13 RX ORDER — PALONOSETRON 0.05 MG/ML
0.25 INJECTION, SOLUTION INTRAVENOUS ONCE
Status: COMPLETED | OUTPATIENT
Start: 2017-11-13 | End: 2017-11-13

## 2017-11-13 RX ORDER — FLUOROURACIL 50 MG/ML
400 INJECTION, SOLUTION INTRAVENOUS ONCE
Status: COMPLETED | OUTPATIENT
Start: 2017-11-13 | End: 2017-11-13

## 2017-11-13 RX ORDER — EPINEPHRINE 0.3 MG/.3ML
INJECTION SUBCUTANEOUS
Status: DISCONTINUED
Start: 2017-11-13 | End: 2017-11-13 | Stop reason: WASHOUT

## 2017-11-13 RX ORDER — HEPARIN SODIUM (PORCINE) LOCK FLUSH IV SOLN 100 UNIT/ML 100 UNIT/ML
5 SOLUTION INTRAVENOUS
Status: COMPLETED | OUTPATIENT
Start: 2017-11-13 | End: 2017-11-13

## 2017-11-13 RX ADMIN — DEXTROSE 250 ML: 5 SOLUTION INTRAVENOUS at 12:00

## 2017-11-13 RX ADMIN — SODIUM CHLORIDE, PRESERVATIVE FREE 5 ML: 5 INJECTION INTRAVENOUS at 10:07

## 2017-11-13 RX ADMIN — FLUOROURACIL 1220 MG: 50 INJECTION, SOLUTION INTRAVENOUS at 15:04

## 2017-11-13 RX ADMIN — DEXAMETHASONE SODIUM PHOSPHATE 150 MG: 10 INJECTION, SOLUTION INTRAMUSCULAR; INTRAVENOUS at 12:14

## 2017-11-13 RX ADMIN — OXALIPLATIN 173 MG: 5 INJECTION, SOLUTION, CONCENTRATE INTRAVENOUS at 12:44

## 2017-11-13 RX ADMIN — LEUCOVORIN CALCIUM 1050 MG: 200 INJECTION, POWDER, LYOPHILIZED, FOR SOLUTION INTRAMUSCULAR; INTRAVENOUS at 12:44

## 2017-11-13 RX ADMIN — PALONOSETRON HYDROCHLORIDE 0.25 MG: 0.25 INJECTION INTRAVENOUS at 12:03

## 2017-11-13 ASSESSMENT — PAIN SCALES - GENERAL: PAINLEVEL: NO PAIN (0)

## 2017-11-13 NOTE — MR AVS SNAPSHOT
After Visit Summary   11/13/2017    Reuben Padilla    MRN: [id number]           Patient Information     Date Of Birth          1960        Visit Information        Provider Department      11/13/2017 10:30 AM  22 ATC;  ONCOLOGY INFUSION [facility]        Today's Diagnoses     Cancer of distal third of esophagus (H)    -  1      Care Instructions    Contact Numbers  [facility] Nurse Triage: [phone]  After Hours Nurse Line:  [phone]    Please call the [facility] Triage line if you experience a temperature greater than or equal to 100.5, shaking chills, have uncontrolled nausea, vomiting and/or diarrhea, dizziness, shortness of breath, chest pain, bleeding, unexplained bruising, or if you have any other new/concerning symptoms, questions or concerns.     If it is after hours, weekends, or holidays, please call either the after hours nurse line listed above.     If you are having any concerning symptoms or wish to speak to a provider before your next infusion visit, please call your care coordinator or triage to notify them so we can adequately serve you.     If you need a refill on a narcotic prescription or other medication, please call triage before your infusion appointment.         November 2017 Sunday Monday Tuesday Wednesday Thursday Friday Saturday                  1     2     3     4       5     6     7     8     9     10     11       12     13     [facility] LAB DRAW   10:00 AM   (15 min.)   [facility] DRAW   [facility] Lab Draw     [facility] ONC INFUSION 240   10:30 AM   (240 min.)    ONCOLOGY INFUSION   [facility] 14     15     16     17     18       19     20     21     22     23     24     25       26     27     28     [facility] LAB DRAW   11:15 AM   (15 min.)   [facility] DRAW   [facility] Lab Draw     [facility] RETURN   11:35 AM   (50 min.)   Katalina Ramirez PA-C   [facility] Cancer  [facility]     [facility] ONC INFUSION 240   12:30 PM   (240 min.)    ONCOLOGY INFUSION   [facility] 29 30 December 2017 Sunday Monday Tuesday Wednesday Thursday Friday Saturday                            1     2       3     4     5     6     7     8     9       10     11     [facility] DRAW   12:30 PM   (15 min.)    [facility]     [facility] RETURN   12:45 PM   (30 min.)   Kadi Dee MD   [facility]     [facility] ONC INFUSION 240    1:30 PM   (240 min.)    ONCOLOGY INFUSION   [facility] 12     13     14     15     16       17     18     19     20     21     22     23       24     25     26     [facility] DRAW    8:45 AM   (15 min.)    [facility] LAB DRAW   [facility] Lab Draw     [facility] RETURN    9:05 AM   (50 min.)   Katalina Ramirez PA-C   [facility]   10:30 AM   (240 min.)    ONCOLOGY INFUSION   [facility] 27     28     29     30       31                                                Lab Results:  Recent Results (from the past 12 hour(s))   CBC with platelets differential    Collection Time: 11/13/17 10:16 AM   Result Value Ref Range    WBC 3.4 (L) 4.0 - 11.0 10e9/L    RBC Count 4.08 (L) 4.4 - 5.9 10e12/L    Hemoglobin 12.8 (L) 13.3 - 17.7 g/dL    Hematocrit 37.7 (L) 40.0 - 53.0 %    MCV 92 78 - 100 fl    MCH 31.4 26.5 - 33.0 pg    MCHC 34.0 31.5 - 36.5 g/dL    RDW 14.0 10.0 - 15.0 %    Platelet Count 74 (L) 150 - 450 10e9/L    Diff Method Automated Method     % Neutrophils 64.7 %    % Lymphocytes 23.2 %    % Monocytes 10.9 %    % Eosinophils 0.3 %    % Basophils 0.6 %    % Immature Granulocytes 0.3 %    Nucleated RBCs 0 0 /100    Absolute Neutrophil 2.2 1.6 - 8.3 10e9/L    Absolute Lymphocytes 0.8 0.8 - 5.3 10e9/L    Absolute Monocytes 0.4 0.0 - 1.3 10e9/L    Absolute Eosinophils 0.0 0.0 - 0.7 10e9/L     Absolute Basophils 0.0 0.0 - 0.2 10e9/L    Abs Immature Granulocytes 0.0 0 - 0.4 10e9/L    Absolute Nucleated RBC 0.0    Comprehensive metabolic panel    Collection Time: 11/13/17 10:16 AM   Result Value Ref Range    Sodium 140 133 - 144 mmol/L    Potassium 4.1 3.4 - 5.3 mmol/L    Chloride 109 94 - 109 mmol/L    Carbon Dioxide 26 20 - 32 mmol/L    Anion Gap 5 3 - 14 mmol/L    Glucose 99 70 - 99 mg/dL    Urea Nitrogen 13 7 - 30 mg/dL    Creatinine 0.91 0.66 - 1.25 mg/dL    GFR Estimate 85 >60 mL/min/1.7m2    GFR Estimate If Black >90 >60 mL/min/1.7m2    Calcium 8.8 8.5 - 10.1 mg/dL    Bilirubin Total 0.5 0.2 - 1.3 mg/dL    Albumin 3.1 (L) 3.4 - 5.0 g/dL    Protein Total 6.6 (L) 6.8 - 8.8 g/dL    Alkaline Phosphatase 108 40 - 150 U/L    ALT 30 0 - 70 U/L    AST 26 0 - 45 U/L               Follow-ups after your visit        Your next 10 appointments already scheduled     Nov 28, 2017 11:15 AM CST   [facility] Lab Draw with  [facility] DRAW   [facility] ([facility])    [address] [place] [id number] [phone]            Nov 28, 2017 11:50 AM CST   (Arrive by 11:35 AM)   Return Visit with Katalina Ramirez PA-C   [facility])    [address] [place] [id number] [phone]            Nov 28, 2017 12:30 PM CST   Infusion 240 with UC ONCOLOGY INFUSION, UC 30 ATC   [facility] ([facility])    [address] [place] [id number] [phone]            Dec 11, 2017 12:30 PM CST   [facility] Lab Draw with [facility] LAB DRAW   [facility] ([facility])    [address] [place] [id number] [phone]            Dec 11, 2017  1:00 PM CST   (Arrive by 12:45 PM)   Return Visit with Kadi Dee MD   [facility]  ([facility])    [address] [place] [phone]   [phone]            Dec 11, 2017  1:30 PM CST   Infusion 240 with UC ONCOLOGY INFUSION, UC 32 ATC   [facility] ([facility])    [address] [place] [phone]   [phone]            Dec 26, 2017  8:45 AM CST   [facility] Lab Draw with  [facility] LAB DRAW   [facility] Lab Draw ([facility])    [address] [place] [id number] [phone]            Dec 26, 2017  9:20 AM CST   (Arrive by 9:05 AM)   Return Visit with Katalina Ramirez PA-C   [facility] ([facility])    [address] [place] [id number] [phone]              Who to contact     If you have questions or need follow up information about today's clinic visit or your schedule please contact [facility] CANCER [facility] directly at [phone].  Normal or non-critical lab and imaging results will be communicated to you by MyChart, letter or phone within 4 business days after the clinic has received the results. If you do not hear from us within 7 days, please contact the clinic through [employer] or phone. If you have a critical or abnormal lab result, we will notify you by phone as soon as possible.  Submit refill requests through [employer] or call your pharmacy and they will forward the refill request to us. Please allow 3 business days for your refill to be completed.          Additional Information About Your Visit        MyChart Information     [employer] gives you secure access to your electronic health record. If you see a primary care provider, you can also send messages to your care team and make appointments. If you have questions, please call your primary care clinic.  If you do not have a primary care provider, please call [phone] and  they will assist you.        Care EveryWhere ID     This is your Care EveryWhere ID. This could be used by other organizations to access your [place] medical records  [id number]        Your Vitals Were     Pulse Temperature Respirations Pulse Oximetry BMI (Body Mass Index)       101 98.1  F (36.7  C) (Oral) 16 97% 41.75 kg/m2        Blood Pressure from Last 3 Encounters:   11/13/17 134/82   10/17/17 (!) 137/95   10/02/17 (!) 128/92    Weight from Last 3 Encounters:   11/13/17 (!) 163.8 kg (361 lb 3.2 oz)   10/30/17 (!) 159.3 kg (351 lb 4.8 oz)   10/17/17 (!) 162.2 kg (357 lb 9.6 oz)              We Performed the Following     CBC with platelets differential     Comprehensive metabolic panel        Primary Care Provider    Physician No Ref-Primary       NO REF-PRIMARY PHYSICIAN        Equal Access to Services     [facility]: Hadii antonio Dunlap, waaxda luangelita, qaybta kaalmada ashleigh, geraldo washington . So [facility] [phone].    ATENCIÓN: Si habla español, tiene a alamo disposición servicios gratuitos de asistencia lingüística. Tanisha al [phone].    We comply with applicable federal civil rights laws and [place] laws. We do not discriminate on the basis of race, color, national origin, age, disability, sex, sexual orientation, or gender identity.            Thank you!     Thank you for choosing [facility]  for your care. Our goal is always to provide you with excellent care. Hearing back from our patients is one way we can continue to improve our services. Please take a few minutes to complete the written survey that you may receive in the mail after your visit with us. Thank you!             Your Updated Medication List - Protect others around you: Learn how to safely use, store and throw away your medicines at www.disposemymeds.org.          This list is accurate as of: 11/13/17  4:21 PM.  Always use your most recent med list.                   Brand  Name Dispense Instructions for use Diagnosis    cyclobenzaprine 10 MG tablet    FLEXERIL     Take 10 mg by mouth        * dexamethasone 4 MG tablet    DECADRON    6 tablet    Take two tablets daily on days 2,3 and then 1 tablet daily days 4,5    Cancer of distal third of esophagus (H)       * dexamethasone 4 MG tablet    DECADRON    6 tablet    Take two tablets daily on days 2,3 and then 1 tablet daily days 4,5    Cancer of distal third of esophagus (H)       fish oil-omega-3 fatty acids 1000 MG capsule      Take 2 g by mouth daily        IBUPROFEN PO      Take 800 mg by mouth every 8 hours as needed for moderate pain        latanoprost 0.005 % ophthalmic solution    XALATAN     Place into both eyes At Bedtime    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  Remove after 12 hours.    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       Multi-vitamin Tabs tablet      Take 1 tablet by mouth daily        ondansetron 8 MG tablet    ZOFRAN    30 tablet    Take 1 tablet (8 mg) by mouth every 8 hours as needed (Nausea/Vomiting)    Cancer of distal third of esophagus (H)       prochlorperazine 10 MG tablet    COMPAZINE    30 tablet    Take 1 tablet (10 mg) by mouth every 6 hours as needed (Nausea/Vomiting)    Cancer of distal third of esophagus (H)       rivaroxaban ANTICOAGULANT 20 MG Tabs tablet    XARELTO    90 tablet    Take 1 tablet (20 mg) by mouth daily (with dinner)    Persistent atrial fibrillation (H), Other  pulmonary embolism without acute cor pulmonale, unspecified chronicity (H), Cancer of distal third of esophagus (H)       timolol 0.5 % ophthalmic solution    TIMOPTIC     Place into both eyes daily    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 Read the directions carefully, and ask your doctor or other care provider to review them with you.

## 2017-11-13 NOTE — PROGRESS NOTES
Infusion Nursing Note:  Reuben Padilla presents today for D1 C13 Oxaliplatin, Leucovorin, Fluorouracil push and pump connect.    Patient seen by provider today: No    Intravenous Access:  Implanted Port.    Treatment Conditions:  Lab Results   Component Value Date    HGB 12.8 11/13/2017     Lab Results   Component Value Date    WBC 3.4 11/13/2017      Lab Results   Component Value Date    ANEU 2.2 11/13/2017     Lab Results   Component Value Date    PLT 74 11/13/2017      Lab Results   Component Value Date     11/13/2017                   Lab Results   Component Value Date    POTASSIUM 4.1 11/13/2017           No results found for: MAG         Lab Results   Component Value Date    CR 0.91 11/13/2017                   Lab Results   Component Value Date    NIDHI 8.8 11/13/2017                Lab Results   Component Value Date    BILITOTAL 0.5 11/13/2017           Lab Results   Component Value Date    ALBUMIN 3.1 11/13/2017                    Lab Results   Component Value Date    ALT 30 11/13/2017           Lab Results   Component Value Date    AST 26 11/13/2017     Today's labs reviewed. Dr. Dee notified that platelet count does not meet treatment parameter.    Note: Patient presents to infusion for FOLFOX. States that his fatigue is more prolonged between cycles. He has diarrhea the week after infusion that resolves after 2 days. Tingling in fingertips has increased to the first knuckle bilaterally.    Reports that he has not been taking Xarelto. Dr. Dee informed.     Fluorouracil Cseries infusion pump connected at 1500.  Positive blood return from port at time of pump hook up. Connections open and taped; double checked by Jose SUTHERLAND RN. Heat sensor taped on abdomen.  Pump to infuse over 46 hours at 5cc/hour.  Pump will be disconnected on Wednesday 11/15/17 by [facility].  Confirmed date/time with  Paresh at [facility]. Mavis aware of pump disconnect date and time.    TORB: Dr. Dee/Nieves Bhandari RN, 11/13/17 @ 1150:  Okay to proceed with treatment with Platelet count 74.    Post Infusion Assessment:  Patient tolerated infusion without incident.  Blood return noted pre and post infusion.  Blood return noted during fluorouracil administration every 2 cc.  Site patent and intact, free from redness, edema or discomfort.  No evidence of extravasations.    Discharge Plan:   Prescription refills given for decadron.  Discharge instructions reviewed with: Patient.  Patient and/or family verbalized understanding of discharge instructions and all questions answered.  Copy of AVS reviewed with patient and/or family.  Patient will return 11/28/17 for next appointment.  Patient discharged in stable condition accompanied by: self.  Departure Mode: Ambulatory.    Nieves Bhandari RN

## 2017-11-13 NOTE — PATIENT INSTRUCTIONS
Contact Numbers  [facility] Nurse Triage: [phone]  After Hours Nurse Line:  [phone]    Please call the [facility] Triage line if you experience a temperature greater than or equal to 100.5, shaking chills, have uncontrolled nausea, vomiting and/or diarrhea, dizziness, shortness of breath, chest pain, bleeding, unexplained bruising, or if you have any other new/concerning symptoms, questions or concerns.     If it is after hours, weekends, or holidays, please call either the after hours nurse line listed above.     If you are having any concerning symptoms or wish to speak to a provider before your next infusion visit, please call your care coordinator or triage to notify them so we can adequately serve you.     If you need a refill on a narcotic prescription or other medication, please call triage before your infusion appointment.         November 2017 Sunday Monday Tuesday Wednesday Thursday Friday Saturday                  1     2     3     4       5     6     7     8     9     10     11       12     13     [facility] LAB DRAW   10:00 AM   (15 min.)    [facility] LAB DRAW   [facility] Lab Draw     [facility] ONC INFUSION 240   10:30 AM   (240 min.)    ONCOLOGY INFUSION   [facility] 14     15     16     17     18       19     20     21     22     23     24     25       26     27     28     [facility] DRAW   11:15 AM   (15 min.)    [facility] DRAW   [facility] Lab Draw     [facility] RETURN   11:35 AM   (50 min.)   Katalina Ramirez PA-C   [facility] ONC INFUSION 240   12:30 PM   (240 min.)    ONCOLOGY INFUSION   [facility] 29 30 December 2017 Sunday Monday Tuesday Wednesday Thursday Friday Saturday                            1     2       3     4     5     6     7     8     9       10     11     [facility] DRAW   12:30 PM   (15 min.)   [facility] DRAW   [facility]  Lab Draw     [facility] RETURN   12:45 PM   (30 min.)   Kadi Dee MD   [facility] ONC INFUSION 240    1:30 PM   (240 min.)    ONCOLOGY INFUSION   [facility] 12     13     14     15     16       17     18     19     20     21     22     23       24     25     26     [facility] LAB DRAW    8:45 AM   (15 min.)   [facility] LAB DRAW   [facility] Lab Draw     [facility] RETURN    9:05 AM   (50 min.)   Katalina Ramirez PA-C   [facility] ONC INFUSION 240   10:30 AM   (240 min.)    ONCOLOGY INFUSION   [facility] 27     28     29     30       31                                                Lab Results:  Recent Results (from the past 12 hour(s))   CBC with platelets differential    Collection Time: 11/13/17 10:16 AM   Result Value Ref Range    WBC 3.4 (L) 4.0 - 11.0 10e9/L    RBC Count 4.08 (L) 4.4 - 5.9 10e12/L    Hemoglobin 12.8 (L) 13.3 - 17.7 g/dL    Hematocrit 37.7 (L) 40.0 - 53.0 %    MCV 92 78 - 100 fl    MCH 31.4 26.5 - 33.0 pg    MCHC 34.0 31.5 - 36.5 g/dL    RDW 14.0 10.0 - 15.0 %    Platelet Count 74 (L) 150 - 450 10e9/L    Diff Method Automated Method     % Neutrophils 64.7 %    % Lymphocytes 23.2 %    % Monocytes 10.9 %    % Eosinophils 0.3 %    % Basophils 0.6 %    % Immature Granulocytes 0.3 %    Nucleated RBCs 0 0 /100    Absolute Neutrophil 2.2 1.6 - 8.3 10e9/L    Absolute Lymphocytes 0.8 0.8 - 5.3 10e9/L    Absolute Monocytes 0.4 0.0 - 1.3 10e9/L    Absolute Eosinophils 0.0 0.0 - 0.7 10e9/L    Absolute Basophils 0.0 0.0 - 0.2 10e9/L    Abs Immature Granulocytes 0.0 0 - 0.4 10e9/L    Absolute Nucleated RBC 0.0    Comprehensive metabolic panel    Collection Time: 11/13/17 10:16 AM   Result Value Ref Range    Sodium 140 133 - 144 mmol/L    Potassium 4.1 3.4 - 5.3 mmol/L    Chloride 109 94 - 109 mmol/L    Carbon Dioxide 26 20 - 32 mmol/L    Anion Gap 5 3 - 14 mmol/L    Glucose 99 70 - 99 mg/dL     Urea Nitrogen 13 7 - 30 mg/dL    Creatinine 0.91 0.66 - 1.25 mg/dL    GFR Estimate 85 >60 mL/min/1.7m2    GFR Estimate If Black >90 >60 mL/min/1.7m2    Calcium 8.8 8.5 - 10.1 mg/dL    Bilirubin Total 0.5 0.2 - 1.3 mg/dL    Albumin 3.1 (L) 3.4 - 5.0 g/dL    Protein Total 6.6 (L) 6.8 - 8.8 g/dL    Alkaline Phosphatase 108 40 - 150 U/L    ALT 30 0 - 70 U/L    AST 26 0 - 45 U/L

## 2017-11-13 NOTE — NURSING NOTE
"Chief Complaint   Patient presents with     Port Draw     labs drawn from port by rn.  vs taken     Port accessed with 20 gauge 3/4\" Power needle and labs drawn by rn.  Port flushed with NS and heparin.  Pt tolerated well.  VS taken.  Pt checked in for next appt.  Faye Solano RN    "

## 2017-11-14 ASSESSMENT — ENCOUNTER SYMPTOMS
DEPRESSION: 0
HYPERTENSION: 0
DOUBLE VISION: 0
DIZZINESS: 1
SYNCOPE: 0
PARALYSIS: 0
NIGHT SWEATS: 0
LOSS OF CONSCIOUSNESS: 0
EXERCISE INTOLERANCE: 0
NECK MASS: 0
VOMITING: 0
LIGHT-HEADEDNESS: 1
BLOATING: 0
EYE REDNESS: 1
POLYDIPSIA: 0
PANIC: 0
STIFFNESS: 0
FATIGUE: 1
CHILLS: 1
DECREASED APPETITE: 0
TASTE DISTURBANCE: 1
EYE PAIN: 0
ORTHOPNEA: 0
SORE THROAT: 0
SMELL DISTURBANCE: 0
DISTURBANCES IN COORDINATION: 1
HALLUCINATIONS: 0
NECK PAIN: 0
CONSTIPATION: 1
DECREASED CONCENTRATION: 1
POLYPHAGIA: 0
ABDOMINAL PAIN: 0
WEIGHT GAIN: 1
SEIZURES: 0
FEVER: 0
WEIGHT LOSS: 0
ARTHRALGIAS: 0
NUMBNESS: 1
MUSCLE WEAKNESS: 1
PALPITATIONS: 0
BLOOD IN STOOL: 0
LEG PAIN: 1
TREMORS: 0
DIARRHEA: 1
HOARSE VOICE: 0
EYE IRRITATION: 1
JOINT SWELLING: 0
NERVOUS/ANXIOUS: 0
HEARTBURN: 0
WEAKNESS: 1
EYE WATERING: 0
INCREASED ENERGY: 1
HEADACHES: 0
SINUS PAIN: 0
SPEECH CHANGE: 0
TROUBLE SWALLOWING: 0
JAUNDICE: 0
RECTAL PAIN: 0
HYPOTENSION: 0
BACK PAIN: 0
MYALGIAS: 1
ALTERED TEMPERATURE REGULATION: 1
BOWEL INCONTINENCE: 0
TINGLING: 1
NAUSEA: 0
MUSCLE CRAMPS: 0
MEMORY LOSS: 1
INSOMNIA: 1
SLEEP DISTURBANCES DUE TO BREATHING: 0
SINUS CONGESTION: 1

## 2017-11-14 NOTE — PROGRESS NOTES
This is a recent snapshot of the patient's [facility] medical record.  For current drug dose and complete information and questions, call [phone] or In Basket pool, fv home infusion ([id number])  CSN Number:  [id number]

## 2017-11-15 ENCOUNTER — HOME INFUSION (PRE-WILLOW HOME INFUSION) (OUTPATIENT)
Dept: PHARMACY | Facility: CLINIC | Age: 57
End: 2017-11-15

## 2017-11-16 NOTE — PROGRESS NOTES
This is a recent snapshot of the patient's [facility] medical record.  For current drug dose and complete information and questions, call [phone] or In Basket pool, fv home infusion ([id number])  CSN Number:  [id number]

## 2017-11-28 ENCOUNTER — APPOINTMENT (OUTPATIENT)
Dept: LAB | Facility: CLINIC | Age: 57
End: 2017-11-28
Attending: INTERNAL MEDICINE
Payer: COMMERCIAL

## 2017-11-28 ENCOUNTER — ONCOLOGY VISIT (OUTPATIENT)
Dept: ONCOLOGY | Facility: CLINIC | Age: 57
End: 2017-11-28
Attending: INTERNAL MEDICINE
Payer: COMMERCIAL

## 2017-11-28 VITALS
DIASTOLIC BLOOD PRESSURE: 75 MMHG | WEIGHT: 315 LBS | HEIGHT: 78 IN | BODY MASS INDEX: 36.45 KG/M2 | SYSTOLIC BLOOD PRESSURE: 140 MMHG | HEART RATE: 100 BPM | RESPIRATION RATE: 20 BRPM | OXYGEN SATURATION: 97 % | TEMPERATURE: 98.1 F

## 2017-11-28 DIAGNOSIS — M25.511 ACUTE PAIN OF RIGHT SHOULDER: Primary | ICD-10-CM

## 2017-11-28 PROCEDURE — 25000128 H RX IP 250 OP 636: Mod: ZF | Performed by: PHYSICIAN ASSISTANT

## 2017-11-28 PROCEDURE — 36591 DRAW BLOOD OFF VENOUS DEVICE: CPT

## 2017-11-28 PROCEDURE — 99213 OFFICE O/P EST LOW 20 MIN: CPT | Mod: ZF

## 2017-11-28 PROCEDURE — 99215 OFFICE O/P EST HI 40 MIN: CPT | Mod: ZP | Performed by: PHYSICIAN ASSISTANT

## 2017-11-28 RX ORDER — ALBUTEROL SULFATE 90 UG/1
1-2 AEROSOL, METERED RESPIRATORY (INHALATION)
Status: CANCELLED
Start: 2017-12-11

## 2017-11-28 RX ORDER — MEPERIDINE HYDROCHLORIDE 25 MG/ML
25 INJECTION INTRAMUSCULAR; INTRAVENOUS; SUBCUTANEOUS EVERY 30 MIN PRN
Status: CANCELLED | OUTPATIENT
Start: 2017-12-11

## 2017-11-28 RX ORDER — EPINEPHRINE 1 MG/ML
0.3 INJECTION, SOLUTION, CONCENTRATE INTRAVENOUS EVERY 5 MIN PRN
Status: CANCELLED | OUTPATIENT
Start: 2017-12-11

## 2017-11-28 RX ORDER — PALONOSETRON 0.05 MG/ML
0.25 INJECTION, SOLUTION INTRAVENOUS ONCE
Status: CANCELLED | OUTPATIENT
Start: 2017-12-11 | End: 2017-12-05

## 2017-11-28 RX ORDER — HEPARIN SODIUM (PORCINE) LOCK FLUSH IV SOLN 100 UNIT/ML 100 UNIT/ML
5 SOLUTION INTRAVENOUS
Status: COMPLETED | OUTPATIENT
Start: 2017-11-28 | End: 2017-11-28

## 2017-11-28 RX ORDER — EPINEPHRINE 0.3 MG/.3ML
0.3 INJECTION SUBCUTANEOUS EVERY 5 MIN PRN
Status: CANCELLED | OUTPATIENT
Start: 2017-12-11

## 2017-11-28 RX ORDER — ALBUTEROL SULFATE 0.83 MG/ML
2.5 SOLUTION RESPIRATORY (INHALATION)
Status: CANCELLED | OUTPATIENT
Start: 2017-12-11

## 2017-11-28 RX ORDER — DIPHENHYDRAMINE HYDROCHLORIDE 50 MG/ML
50 INJECTION INTRAMUSCULAR; INTRAVENOUS
Status: CANCELLED
Start: 2017-12-11

## 2017-11-28 RX ORDER — LORAZEPAM 2 MG/ML
0.5 INJECTION INTRAMUSCULAR EVERY 4 HOURS PRN
Status: CANCELLED
Start: 2017-12-11

## 2017-11-28 RX ORDER — FLUOROURACIL 50 MG/ML
400 INJECTION, SOLUTION INTRAVENOUS ONCE
Status: CANCELLED | OUTPATIENT
Start: 2017-12-11

## 2017-11-28 RX ORDER — METHYLPREDNISOLONE SODIUM SUCCINATE 125 MG/2ML
125 INJECTION, POWDER, LYOPHILIZED, FOR SOLUTION INTRAMUSCULAR; INTRAVENOUS
Status: CANCELLED
Start: 2017-12-11

## 2017-11-28 RX ORDER — SODIUM CHLORIDE 9 MG/ML
1000 INJECTION, SOLUTION INTRAVENOUS CONTINUOUS PRN
Status: CANCELLED
Start: 2017-12-11

## 2017-11-28 RX ADMIN — SODIUM CHLORIDE, PRESERVATIVE FREE 5 ML: 5 INJECTION INTRAVENOUS at 11:46

## 2017-11-28 ASSESSMENT — PAIN SCALES - GENERAL: PAINLEVEL: MODERATE PAIN (4)

## 2017-11-28 NOTE — LETTER
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 His tumor is positive for cytokeratin 20, negative for P63 and CK7, and HER2 is negative. He started on FOLFOX (5FU/oxaliplatin) on 5/15/2017. He had a delay in treatment from 9/5-10/2/17 due to work related injury.    INTERVAL HISTORY:   Reuben is here with his sister today. Nothing new. Neuropathy remains stable, affecting his fngertips. No affect on function. He recently had a URI that multiple family members also had. He mostly had a bad cough which is improving day by day now. No fevers or chills, no SOB. No other infectious symptoms whatsoever. Fatigue remains stable. His only concern today is that though his chest wall pains have improved since the injury, his R shoulder remains bothersome. Hurts to lift stuff. No isues with ROM. He would like to go get a second opinion at [place].  No concerns otherwise. No fevers, chills, cough, SOB, chest pain, vomiting, abdominal pain, bowel changes, swelling, palpitations, or dizziness. Remainder of 10-pt ROS otherwise is negative.    Current Outpatient Prescriptions   Medication Sig Dispense Refill     cyclobenzaprine (FLEXERIL) 10 MG tablet Take 10 mg by mouth       dexamethasone (DECADRON) 4 MG tablet Take two tablets daily on days 2,3 and then 1 tablet daily days 4,5 6 tablet 1     dexamethasone (DECADRON) 4 MG tablet Take two tablets daily on days 2,3 and then 1 tablet daily days 4,5 (Patient not taking: Reported on 11/13/2017) 6 tablet 1     rivaroxaban ANTICOAGULANT (XARELTO) 20 MG TABS tablet Take 1 tablet (20 mg) by mouth daily (with dinner) (Patient not taking: Reported on 11/13/2017) 90 tablet 3     lidocaine (LIDODERM) 5 % Patch Apply up to 3 patches  "to painful area at once for up to 12 h within a 24 h period.  Remove after 12 hours. (Patient not taking: Reported on 11/13/2017) 30 patch 0     zolpidem (AMBIEN) 10 MG tablet Take 1 tablet (10 mg) by mouth nightly as needed 60 tablet 1     ondansetron (ZOFRAN) 8 MG tablet Take 1 tablet (8 mg) by mouth every 8 hours as needed (Nausea/Vomiting) (Patient not taking: Reported on 10/30/2017) 30 tablet 2     lidocaine-prilocaine (EMLA) cream Apply topically as needed for moderate pain 30 g 3     prochlorperazine (COMPAZINE) 10 MG tablet Take 1 tablet (10 mg) by mouth every 6 hours as needed (Nausea/Vomiting) (Patient not taking: Reported on 10/17/2017) 30 tablet 2     LORazepam (ATIVAN) 0.5 MG tablet Take 1 tablet (0.5 mg) by mouth every 6 hours as needed for anxiety (Patient not taking: Reported on 10/17/2017) 60 tablet 1     multivitamin, therapeutic with minerals (MULTI-VITAMIN) TABS tablet Take 1 tablet by mouth daily       fish oil-omega-3 fatty acids 1000 MG capsule Take 2 g by mouth daily       IBUPROFEN PO Take 800 mg by mouth every 8 hours as needed for moderate pain       latanoprost (XALATAN) 0.005 % ophthalmic solution Place into both eyes At Bedtime       timolol (TIMOPTIC) 0.5 % ophthalmic solution Place into both eyes daily            Allergies   Allergen Reactions     Penicillin G Other (See Comments)     Pt not sure-     Penicillins Unknown       PHYSICAL EXAMINATION  /75 (BP Location: Right arm, Patient Position: Sitting, Cuff Size: Adult Large)  Pulse 100  Temp 98.1  F (36.7  C) (Oral)  Resp 20  Ht 1.981 m (6' 5.99\")  Wt (!) 163.1 kg (359 lb 9.6 oz)  SpO2 97%  BMI 41.56 kg/m2  Wt Readings from Last 4 Encounters:   11/28/17 (!) 163.1 kg (359 lb 9.6 oz)   11/13/17 (!) 163.8 kg (361 lb 3.2 oz)   10/30/17 (!) 159.3 kg (351 lb 4.8 oz)   10/17/17 (!) 162.2 kg (357 lb 9.6 oz)     Constitutional: Alert, oriented male in no visible distress.  Eyes: PERRL. Anicteric sclerae.  ENT/Mouth: OM moist and " pink without lesions or thrush.  CV: sinus rhythm today, RRR  Resp: CTAB throughout  Abdomen: Soft, non-tender, non-distended. Bowel sounds present.Limited by body habitus and seated position  Extremities: No lower extremity edema appreciated.  Skin: Warm, dry.  Some mild venous stasis changes on LE bilat.  Lymph: No cervical or supraclavicular lymphadenopathy appreciated.   Neuro: CN II-XII grossly intact.    LABS:  Results for REUBEN CHANEY (MRN [id number]) as of 11/28/2017 12:09   Ref. Range 11/13/2017 10:16 11/28/2017 11:55   WBC Latest Ref Range: 4.0 - 11.0 10e9/L 3.4 (L) 2.2 (L)   Hemoglobin Latest Ref Range: 13.3 - 17.7 g/dL 12.8 (L) 12.6 (L)   Hematocrit Latest Ref Range: 40.0 - 53.0 % 37.7 (L) 38.4 (L)   Platelet Count Latest Ref Range: 150 - 450 10e9/L 74 (L) 99 (L)       IMPRESSION/PLAN:  Reuben Chaney is a 56 year old male with metastatic esophogeal involvement the liver and widespread lavon involvement, currently on FOLFOX.     1. Metastatic esophogeal cancer. Continues on FOLFOX with dose reduction of oxaliplatin due to neuropathy. He had stable disease on imaging 10/30. Plan to complete another 4 cycles and repeat imaging. His ANC is 1000 today in setting of presumed viral URI symptoms. Though his symptoms are improving and he remains afebrile, will defer today. I recommended that he return next week for chemotherapy, and even offered an appointment later this week as well; he initially agreed to come next week, but after our visit, he decided that he would cancel this infusion all together and just return in 2 weeks. We had discussed my preference to avoid doing this to avoid frequent breaks in chemotherapy during our visit.    2. Nausea. Improved with Emend and Decadron.     3. A fib. Intermittent a-fib. Sinus today. He is on Xarelto for PE.    4. Incidental PE: Identified on restaging 7/10/17. He continues on Xarelto. Will continue to monitor PLT.    5. Insomnia: Currently on Ambien    6.  Fatigue: Slightly improved with Dex, however still with fatigue 2/2 chemo and recent accident    7. Work-related injury involving skull, vertebrae, rib fractures: Improving gradually in the chest wall but still with R shoulder pain that has not improved. CT R shoulder at time of injury was unremarkable but noted that with persistent pain, an MRI should be performed. Hussein Ramirez PA-C  Hematology, Oncology, and Transplant  [facility] [address] [id number] [phone]

## 2017-11-28 NOTE — PROGRESS NOTES
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 His tumor is positive for cytokeratin 20, negative for P63 and CK7, and HER2 is negative. He started on FOLFOX (5FU/oxaliplatin) on 5/15/2017. He had a delay in treatment from 9/5-10/2/17 due to work related injury.    INTERVAL HISTORY:   Reuben is here with his sister today. Nothing new. Neuropathy remains stable, affecting his fngertips. No affect on function. He recently had a URI that multiple family members also had. He mostly had a bad cough which is improving day by day now. No fevers or chills, no SOB. No other infectious symptoms whatsoever. Fatigue remains stable. His only concern today is that though his chest wall pains have improved since the injury, his R shoulder remains bothersome. Hurts to lift stuff. No isues with ROM. He would like to go get a second opinion at [place].  No concerns otherwise. No fevers, chills, cough, SOB, chest pain, vomiting, abdominal pain, bowel changes, swelling, palpitations, or dizziness. Remainder of 10-pt ROS otherwise is negative.    Current Outpatient Prescriptions   Medication Sig Dispense Refill     cyclobenzaprine (FLEXERIL) 10 MG tablet Take 10 mg by mouth       dexamethasone (DECADRON) 4 MG tablet Take two tablets daily on days 2,3 and then 1 tablet daily days 4,5 6 tablet 1     dexamethasone (DECADRON) 4 MG tablet Take two tablets daily on days 2,3 and then 1 tablet daily days 4,5 (Patient not taking: Reported on 11/13/2017) 6 tablet 1     rivaroxaban ANTICOAGULANT (XARELTO) 20 MG TABS tablet Take 1 tablet (20 mg) by mouth daily (with dinner) (Patient not taking: Reported on 11/13/2017) 90 tablet 3     lidocaine (LIDODERM) 5 % Patch Apply up to 3 patches to painful area at once for up to 12 h within a 24 h period.  Remove after 12  "hours. (Patient not taking: Reported on 11/13/2017) 30 patch 0     zolpidem (AMBIEN) 10 MG tablet Take 1 tablet (10 mg) by mouth nightly as needed 60 tablet 1     ondansetron (ZOFRAN) 8 MG tablet Take 1 tablet (8 mg) by mouth every 8 hours as needed (Nausea/Vomiting) (Patient not taking: Reported on 10/30/2017) 30 tablet 2     lidocaine-prilocaine (EMLA) cream Apply topically as needed for moderate pain 30 g 3     prochlorperazine (COMPAZINE) 10 MG tablet Take 1 tablet (10 mg) by mouth every 6 hours as needed (Nausea/Vomiting) (Patient not taking: Reported on 10/17/2017) 30 tablet 2     LORazepam (ATIVAN) 0.5 MG tablet Take 1 tablet (0.5 mg) by mouth every 6 hours as needed for anxiety (Patient not taking: Reported on 10/17/2017) 60 tablet 1     multivitamin, therapeutic with minerals (MULTI-VITAMIN) TABS tablet Take 1 tablet by mouth daily       fish oil-omega-3 fatty acids 1000 MG capsule Take 2 g by mouth daily       IBUPROFEN PO Take 800 mg by mouth every 8 hours as needed for moderate pain       latanoprost (XALATAN) 0.005 % ophthalmic solution Place into both eyes At Bedtime       timolol (TIMOPTIC) 0.5 % ophthalmic solution Place into both eyes daily            Allergies   Allergen Reactions     Penicillin G Other (See Comments)     Pt not sure-     Penicillins Unknown       PHYSICAL EXAMINATION  /75 (BP Location: Right arm, Patient Position: Sitting, Cuff Size: Adult Large)  Pulse 100  Temp 98.1  F (36.7  C) (Oral)  Resp 20  Ht 1.981 m (6' 5.99\")  Wt (!) 163.1 kg (359 lb 9.6 oz)  SpO2 97%  BMI 41.56 kg/m2  Wt Readings from Last 4 Encounters:   11/28/17 (!) 163.1 kg (359 lb 9.6 oz)   11/13/17 (!) 163.8 kg (361 lb 3.2 oz)   10/30/17 (!) 159.3 kg (351 lb 4.8 oz)   10/17/17 (!) 162.2 kg (357 lb 9.6 oz)     Constitutional: Alert, oriented male in no visible distress.  Eyes: PERRL. Anicteric sclerae.  ENT/Mouth: OM moist and pink without lesions or thrush.  CV: sinus rhythm today, RRR  Resp: CTAB " throughout  Abdomen: Soft, non-tender, non-distended. Bowel sounds present.Limited by body habitus and seated position  Extremities: No lower extremity edema appreciated.  Skin: Warm, dry.  Some mild venous stasis changes on LE bilat.  Lymph: No cervical or supraclavicular lymphadenopathy appreciated.   Neuro: CN II-XII grossly intact.    LABS:  Results for REUBEN CHANEY (MRN [id number]) as of 11/28/2017 12:09   Ref. Range 11/13/2017 10:16 11/28/2017 11:55   WBC Latest Ref Range: 4.0 - 11.0 10e9/L 3.4 (L) 2.2 (L)   Hemoglobin Latest Ref Range: 13.3 - 17.7 g/dL 12.8 (L) 12.6 (L)   Hematocrit Latest Ref Range: 40.0 - 53.0 % 37.7 (L) 38.4 (L)   Platelet Count Latest Ref Range: 150 - 450 10e9/L 74 (L) 99 (L)       IMPRESSION/PLAN:  Reuben Chaney is a 56 year old male with metastatic esophogeal involvement the liver and widespread lavon involvement, currently on FOLFOX.     1. Metastatic esophogeal cancer. Continues on FOLFOX with dose reduction of oxaliplatin due to neuropathy. He had stable disease on imaging 10/30. Plan to complete another 4 cycles and repeat imaging. His ANC is 1000 today in setting of presumed viral URI symptoms. Though his symptoms are improving and he remains afebrile, will defer today. I recommended that he return next week for chemotherapy, and even offered an appointment later this week as well; he initially agreed to come next week, but after our visit, he decided that he would cancel this infusion all together and just return in 2 weeks. We had discussed my preference to avoid doing this to avoid frequent breaks in chemotherapy during our visit.    2. Nausea. Improved with Emend and Decadron.     3. A fib. Intermittent a-fib. Sinus today. He is on Xarelto for PE.    4. Incidental PE: Identified on restaging 7/10/17. He continues on Xarelto. Will continue to monitor PLT.    5. Insomnia: Currently on Ambien    6. Fatigue: Slightly improved with Dex, however still with fatigue 2/2 chemo  and recent accident    7. Work-related injury involving skull, vertebrae, rib fractures: Improving gradually in the chest wall but still with R shoulder pain that has not improved. CT R shoulder at time of injury was unremarkable but noted that with persistent pain, an MRI should be performed. Hussein Ramirez PA-C  Hematology, Oncology, and Transplant  [facility]  [address] [place] [id number] [phone]

## 2017-11-28 NOTE — MR AVS SNAPSHOT
After Visit Summary   11/28/2017    Reuben Padilla    MRN: [id number]           Patient Information     Date Of Birth          1960        Visit Information        Provider Department      11/28/2017 11:50 AM Katalina Ramirez PA-C [facility]        Today's Diagnoses     Acute pain of right shoulder    -  1      Care Instructions    Orthopedics number: [phone]        November 2017 Sunday Monday Tuesday Wednesday Thursday Friday Saturday                  1     2     3     4       5     6     7     8     9     10     11       12     13     [facility] DRAW   10:00 AM   (15 min.)    [facility] LAB DRAW   [facility] Lab Draw     [facility] ONC INFUSION 240   10:30 AM   (240 min.)    ONCOLOGY INFUSION   [facility] 14     15     16     17     18       19     20     21     22     23     24     25       26     27     28     [facility] DRAW   11:15 AM   (15 min.)   [facility]   [facility]     [facility] RETURN   11:35 AM   (50 min.)   Katalina Ramirez PA-C   [facility] 29 30 December 2017 Sunday Monday Tuesday Wednesday Thursday Friday Saturday                            1     2       3     4     5     6     7     8     9       10     11     MR UPR EXT JOINT RIGHT WWO   11:00 AM   (60 min.)   UUMR2   [facility] [place]     [facility] LAB DRAW   12:30 PM   (15 min.)   [facility] DRAW   [facility] Lab Draw     UMP RETURN   12:45 PM   (30 min.)   Kadi Dee MD   [facility]     [facility] ONC INFUSION 240    1:30 PM   (240 min.)   UC ONCOLOGY INFUSION   [facility] 12     13     14     15     16       17     18     19     20     21     22     23       24     25     26     [facility] LAB DRAW    8:45 AM   (15 min.)   [facility] DRAW   [facility] Lab Draw     UMP RETURN    9:05 AM   (50 min.)   James  Katalina Stroud PA-C   [facility] ONC INFUSION 240   10:30 AM   (240 min.)    ONCOLOGY INFUSION   [facility] 27     28     29     30       31                                              January 2018 Sunday Monday Tuesday Wednesday Thursday Friday Saturday        1     2     3     4     5     6       7     8     [facility] LAB DRAW    8:30 AM   (15 min.)   [facility] LAB DRAW   [facility] Lab Draw     CT CHEST/ABDOMEN/PELVIS W    8:45 AM   (20 min.)   UCCT2   [facility]     P RETURN   11:45 AM   (30 min.)   Kadi Dee MD   [facility] ONC INFUSION 240    1:00 PM   (240 min.)    ONCOLOGY INFUSION   [facility] 9     10     11     12     13       14     15     16     17     18     19     20       21     22     23     24     25     26     27       28     29     30     31                                     Follow-ups after your visit        Your next 10 appointments already scheduled     Dec 11, 2017 11:00 AM CST   MR UPPER EXTREMITY JOINT RIGHT W/O & W CONTRAST with UUMR2   [facility] [place] ([facility], [facility])    [address] [place] [id number] [phone]           Take your medicines as usual, unless your doctor tells you not to. Bring a list of your current medicines to your exam (including vitamins, minerals and over-the-counter drugs).  You will be given intravenous contrast for this exam. To prepare:   The day before your exam, drink extra fluids at least six 8-ounce glasses (unless your doctor tells you to restrict your fluids).   Have a blood test (creatinine test) within 30 days of your exam. Go to your clinic or Diagnostic Imaging Department for this test.  The MRI machine uses a strong magnet. Please wear clothes without metal (snaps, zippers). A sweatsuit works well, or we may give you a hospital gown.  Please  remove any body piercings and hair extensions before you arrive. You will also remove watches, jewelry, hairpins, wallets, dentures, partial dental plates and hearing aids. You may wear contact lenses, and you may be able to wear your rings. We have a safe place to keep your personal items, but it is safer to leave them at home.   **IMPORTANT** THE INSTRUCTIONS BELOW ARE ONLY FOR THOSE PATIENTS WHO HAVE BEEN TOLD THEY WILL RECEIVE SEDATION OR GENERAL ANESTHESIA DURING THEIR MRI PROCEDURE:  IF YOU WILL RECEIVE SEDATION (take medicine to help you relax during your exam):   You must get the medicine from your doctor before you arrive. Bring the medicine to the exam. Do not take it at home.   Arrive one hour early. Bring someone who can take you home after the test. Your medicine will make you sleepy. After the exam, you may not drive, take a bus or take a taxi by yourself.   No eating 8 hours before your exam. You may have clear liquids up until 4 hours before your exam. (Clear liquids include water, clear tea, black coffee and fruit juice without pulp.)  IF YOU WILL RECEIVE ANESTHESIA (be asleep for your exam):   Arrive 1 1/2 hours early. Bring someone who can take you home after the test. You may not drive, take a bus or take a taxi by yourself.   No eating 8 hours before your exam. You may have clear liquids up until 4 hours before your exam. (Clear liquids include water, clear tea, black coffee and fruit juice without pulp.)  Please call the Imaging Department at your exam site with any questions.            Dec 11, 2017 12:30 PM CST   [facility] Lab Draw with  [facility] LAB DRAW   [facility] Lab Draw ([facility])    [address] [place] [id number] [phone]            Dec 11, 2017  1:00 PM CST   (Arrive by 12:45 PM)   Return Visit with Kadi Dee MD   [facility] ([facility])    [address] [place] [address]  [facility] [address]   [phone]            Dec 11, 2017  1:30 PM CST   Infusion 240 with UC ONCOLOGY INFUSION, UC 32 ATC   [facility] ([facility])    [address] [place] [address]  [address] [facility] [address]   [phone]            Dec 26, 2017  8:45 AM CST   Masonic Lab Draw with  MASONIC LAB DRAW   [facility] Lab Draw ([facility])    [address] [place] [address]   [phone]            Dec 26, 2017  9:20 AM CST   (Arrive by 9:05 AM)   Return Visit with Katalina Ramirez PA-C   [facility] ([facility])    [address] [place] [address]   [phone]            Dec 26, 2017 10:30 AM CST   Infusion 240 with UC ONCOLOGY INFUSION, UC 21 ATC   [facility] ([facility])    [address] [place] [address]   [phone]            Jan 08, 2018  9:00 AM CST   (Arrive by 8:45 AM)   CT CHEST/ABDOMEN/PELVIS W CONTRAST with UCCT2   [facility] [place] [facility] ([facility])    [address] [place] [address]   [phone]           Please bring any scans or X-rays taken at other hospitals, if similar tests were done. Also bring a list of your medicines, including vitamins, minerals and over-the-counter drugs. It is safest to leave personal items at home.  Be sure to tell your doctor:   If you have any allergies.   If there s any chance you are pregnant.   If you are breastfeeding.   If you have any special needs.  You may have contrast for this exam. To prepare:   Do not eat or drink for 2 hours before your exam. If you need to take medicine, you may take it with small sips of water. (We may ask you to take liquid medicine as well.)   The day before your exam, drink extra fluids at least six 8-ounce  glasses (unless your doctor tells you to restrict your fluids).  Patients over 70 or patients with diabetes or kidney problems:   If you haven t had a blood test (creatinine test) within the last 30 days, go to your clinic or Diagnostic Imaging Department for this test.  If you have diabetes:   If your kidney function is normal, continue taking your metformin (Avandamet, Glucophage, Glucovance, Metaglip) on the day of your exam.   If your kidney function is abnormal, wait 48 hours before restarting this medicine.  You will have oral contrast for this exam:   You will drink the contrast at home. Get this from your clinic or Diagnostic Imaging Department. Please follow the directions given.  Please wear loose clothing, such as a sweat suit or jogging clothes. Avoid snaps, zippers and other metal. We may ask you to undress and put on a hospital gown.  If you have any questions, please call the Imaging Department where you will have your exam.              Future tests that were ordered for you today     Open Future Orders        Priority Expected Expires Ordered    MR Upper Ext Joint Rt w/o & w Contrast Routine  11/28/2018 11/28/2017            Who to contact     If you have questions or need follow up information about today's clinic visit or your schedule please contact [facility] directly at [phone].  Normal or non-critical lab and imaging results will be communicated to you by [employer], letter or phone within 4 business days after the clinic has received the results. If you do not hear from us within 7 days, please contact the clinic through [employer] or phone. If you have a critical or abnormal lab result, we will notify you by phone as soon as possible.  Submit refill requests through Embedded Chat or call your pharmacy and they will forward the refill request to us. Please allow 3 business days for your refill to be completed.          Additional Information About Your Visit        Embedded Chat  "Information     [employer] gives you secure access to your electronic health record. If you see a primary care provider, you can also send messages to your care team and make appointments. If you have questions, please call your primary care clinic.  If you do not have a primary care provider, please call [phone] and they will assist you.        Care EveryWhere ID     This is your Care EveryWhere ID. This could be used by other organizations to access your [place] medical records  [id number]        Your Vitals Were     Pulse Temperature Respirations Height Pulse Oximetry BMI (Body Mass Index)    100 98.1  F (36.7  C) (Oral) 20 1.981 m (6' 5.99\") 97% 41.56 kg/m2       Blood Pressure from Last 3 Encounters:   11/28/17 140/75   11/13/17 134/82   10/17/17 (!) 137/95    Weight from Last 3 Encounters:   11/28/17 (!) 163.1 kg (359 lb 9.6 oz)   11/13/17 (!) 163.8 kg (361 lb 3.2 oz)   10/30/17 (!) 159.3 kg (351 lb 4.8 oz)                 Today's Medication Changes          These changes are accurate as of: 11/28/17  1:38 PM.  If you have any questions, ask your nurse or doctor.               These medicines have changed or have updated prescriptions.        Dose/Directions    dexamethasone 4 MG tablet   Commonly known as:  DECADRON   This may have changed:  Another medication with the same name was removed. Continue taking this medication, and follow the directions you see here.   Used for:  Cancer of distal third of esophagus (H)        Take two tablets daily on days 2,3 and then 1 tablet daily days 4,5   Quantity:  6 tablet   Refills:  1         Stop taking these medicines if you haven't already. Please contact your care team if you have questions.     cyclobenzaprine 10 MG tablet   Commonly known as:  FLEXERIL   Stopped by:  Katalina Ramirez PA-C           IBUPROFEN PO   Stopped by:  Katalina Ramirez PA-C           latanoprost 0.005 % ophthalmic solution   Commonly known as:  XALATAN   Stopped by:  " Katalina Ramirez PA-C           lidocaine 5 % Patch   Commonly known as:  LIDODERM   Stopped by:  Katalina Ramirez PA-C                    Primary Care Provider Fax #    Physician No Ref-Primary [phone]       No address on file        Equal Access to Services     OLLIE SHARPE : Hadii antonio ku hadolgao Soomaali, waaxda luqadaha, qaybta kaalmada adeegyada, waxrachel idiin haylann maggie munson lasantyotilio . So [facility] [phone].    ATENCIÓN: Si habla español, tiene a alamo disposición servicios gratuitos de asistencia lingüística. Llame al [phone].    We comply with applicable federal civil rights laws and [place] laws. We do not discriminate on the basis of race, color, national origin, age, disability, sex, sexual orientation, or gender identity.            Thank you!     Thank you for choosing [facility]  for your care. Our goal is always to provide you with excellent care. Hearing back from our patients is one way we can continue to improve our services. Please take a few minutes to complete the written survey that you may receive in the mail after your visit with us. Thank you!             Your Updated Medication List - Protect others around you: Learn how to safely use, store and throw away your medicines at www.disposemymeds.org.          This list is accurate as of: 11/28/17  1:38 PM.  Always use your most recent med list.                   Brand Name Dispense Instructions for use Diagnosis    dexamethasone 4 MG tablet    DECADRON    6 tablet    Take two tablets daily on days 2,3 and then 1 tablet daily days 4,5    Cancer of distal third of esophagus (H)       fish oil-omega-3 fatty acids 1000 MG capsule      Take 2 g by mouth daily        lidocaine-prilocaine cream    EMLA    30 g    Apply topically as needed for moderate pain    Cancer of distal third of esophagus (H)       LORazepam 0.5 MG tablet    ATIVAN    60 tablet    Take 1 tablet (0.5 mg) by mouth every 6 hours as  needed for anxiety    Cancer of distal third of esophagus (H), 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       Multi-vitamin Tabs tablet      Take 1 tablet by mouth daily        ondansetron 8 MG tablet    ZOFRAN    30 tablet    Take 1 tablet (8 mg) by mouth every 8 hours as needed (Nausea/Vomiting)    Cancer of distal third of esophagus (H)       prochlorperazine 10 MG tablet    COMPAZINE    30 tablet    Take 1 tablet (10 mg) by mouth every 6 hours as needed (Nausea/Vomiting)    Cancer of distal third of esophagus (H)       rivaroxaban ANTICOAGULANT 20 MG Tabs tablet    XARELTO    90 tablet    Take 1 tablet (20 mg) by mouth daily (with dinner)    Persistent atrial fibrillation (H), Other pulmonary embolism without acute cor pulmonale, unspecified chronicity (H), Cancer of distal third of esophagus (H)       timolol 0.5 % ophthalmic solution    TIMOPTIC     Place into both eyes daily    Cancer of distal third of esophagus (H)       zolpidem 10 MG tablet    AMBIEN    60 tablet    Take 1 tablet (10 mg) by mouth nightly as needed    Metastasis from gastric cancer (H)

## 2017-11-28 NOTE — NURSING NOTE
"Oncology Rooming Note    November 28, 2017 12:11 PM   Reuben Padilla is a 57 year old male who presents for:    Chief Complaint   Patient presents with     Port Draw     labs drawn from port by rn.  vs taken     Oncology Clinic Visit     F/u Esophageal CA     Initial Vitals: /75 (BP Location: Right arm, Patient Position: Sitting, Cuff Size: Adult Large)  Pulse 100  Temp 98.1  F (36.7  C) (Oral)  Resp 20  Ht 1.981 m (6' 5.99\")  Wt (!) 163.1 kg (359 lb 9.6 oz)  SpO2 97%  BMI 41.56 kg/m2 Estimated body mass index is 41.56 kg/(m^2) as calculated from the following:    Height as of this encounter: 1.981 m (6' 5.99\").    Weight as of this encounter: 163.1 kg (359 lb 9.6 oz). Body surface area is 3 meters squared.  Moderate Pain (4) Comment: Upper to lower rt back pain   No LMP for male patient.  Allergies reviewed: Yes  Medications reviewed: Yes    Medications: Medication refills not needed today.  Pharmacy name entered into [employer]:    [id number] - [place] - [address] [address]SE[address] [place], [place] - [address] [place] [address] [phone]    Clinical concerns: none Katalina was NOT notified.    10 minutes for nursing intake (face to face time)     SENA ROSE LPN            "

## 2017-11-28 NOTE — PATIENT INSTRUCTIONS
Orthopedics number: [phone]        November 2017 Sunday Monday Tuesday Wednesday Thursday Friday Saturday                  1     2     3     4       5     6     7     8     9     10     11       12     13     [facility] DRAW   10:00 AM   (15 min.)   [facility] LAB DRAW   [facility] Mason[facility] Lab Draw     UMP ONC INFUSION 240   10:30 AM   (240 min.)    ONCOLOGY INFUSION   [facility] 14     15     16     17     18       19     20     21     22     23     24     25       26     27     28     [facility] LAB DRAW   11:15 AM   (15 min.)   [facility] LAB DRAW   [facility] Lab Draw     UMP RETURN   11:35 AM   (50 min.)   Katalina Ramirez PA-C   [facility] 29 30 December 2017 Sunday Monday Tuesday Wednesday Thursday Friday Saturday                            1     2       3     4     5     6     7     8     9       10     11     MR UPR EXT JOINT RIGHT WWO   11:00 AM   (60 min.)   [facility]R2   [facility] [place]   12:30 PM   (15 min.)   [facility] LAB DRAW   [facility] Mason[facility] Lab Draw     UMP RETURN   12:45 PM   (30 min.)   Kadi Dee MD   [facility]     UMP ONC INFUSION 240    1:30 PM   (240 min.)    ONCOLOGY INFUSION   [facility] 12     13     14     15     16       17     18     19     20     21     22     23       24     25     26     [facility] DRAW    8:45 AM   (15 min.)   [facility] LAB DRAW   [facility] Lab Draw     UMP RETURN    9:05 AM   (50 min.)   Katalina Ramirez PA-C   [facility]     UMP ONC INFUSION 240   10:30 AM   (240 min.)    ONCOLOGY INFUSION   [facility] 27 28 29     30       31 January 2018 Sunday Monday Tuesday Wednesday Thursday Friday Saturday        1     2     3     4     5     6       7     8     [facility] LAB  DRAW    8:30 AM   (15 min.)   [facility] LAB DRAW   [facility] Lab Draw     CT CHEST/ABDOMEN/PELVIS W    8:45 AM   (20 min.)   UCCT2   [facility]     [facility] RETURN   11:45 AM   (30 min.)   Kadi Dee MD   [facility] ONC INFUSION 240    1:00 PM   (240 min.)    ONCOLOGY INFUSION   [facility] 9     10     11     12     13       14     15     16     17     18     19     20       21     22     23     24     25     26     27       28     29     30     31

## 2017-12-06 ASSESSMENT — ENCOUNTER SYMPTOMS
CONSTIPATION: 1
ALTERED TEMPERATURE REGULATION: 1
POLYPHAGIA: 0
ABDOMINAL PAIN: 0
MYALGIAS: 1
SEIZURES: 0
PARALYSIS: 0
SPUTUM PRODUCTION: 0
WEAKNESS: 1
BOWEL INCONTINENCE: 0
POOR WOUND HEALING: 0
WEIGHT GAIN: 0
NUMBNESS: 1
HEADACHES: 0
LIGHT-HEADEDNESS: 1
SLEEP DISTURBANCES DUE TO BREATHING: 0
ARTHRALGIAS: 1
BLOOD IN STOOL: 0
HALLUCINATIONS: 0
LOSS OF CONSCIOUSNESS: 0
DIZZINESS: 1
NIGHT SWEATS: 0
RECTAL PAIN: 0
ORTHOPNEA: 0
POSTURAL DYSPNEA: 0
WHEEZING: 0
JOINT SWELLING: 0
TREMORS: 0
DECREASED CONCENTRATION: 1
LEG PAIN: 0
COUGH DISTURBING SLEEP: 0
PANIC: 0
PALPITATIONS: 0
INSOMNIA: 1
HEARTBURN: 0
NAUSEA: 0
NERVOUS/ANXIOUS: 0
WEIGHT LOSS: 1
POLYDIPSIA: 0
DISTURBANCES IN COORDINATION: 1
HYPERTENSION: 0
SYNCOPE: 0
SKIN CHANGES: 0
VOMITING: 0
TINGLING: 1
CHILLS: 1
MUSCLE CRAMPS: 0
FEVER: 0
MEMORY LOSS: 1
BLOATING: 0
NECK PAIN: 0
BACK PAIN: 1
FATIGUE: 0
MUSCLE WEAKNESS: 1
COUGH: 0
INCREASED ENERGY: 1
HYPOTENSION: 0
SNORES LOUDLY: 0
DECREASED APPETITE: 0
DYSPNEA ON EXERTION: 0
SPEECH CHANGE: 0
JAUNDICE: 0
EXERCISE INTOLERANCE: 1
DEPRESSION: 0
HEMOPTYSIS: 0
DIARRHEA: 1
NAIL CHANGES: 0
SHORTNESS OF BREATH: 0

## 2017-12-11 ENCOUNTER — APPOINTMENT (OUTPATIENT)
Dept: LAB | Facility: CLINIC | Age: 57
End: 2017-12-11
Attending: INTERNAL MEDICINE
Payer: COMMERCIAL

## 2017-12-11 ENCOUNTER — ONCOLOGY VISIT (OUTPATIENT)
Dept: ONCOLOGY | Facility: CLINIC | Age: 57
End: 2017-12-11
Attending: INTERNAL MEDICINE
Payer: COMMERCIAL

## 2017-12-11 ENCOUNTER — HOME INFUSION (PRE-WILLOW HOME INFUSION) (OUTPATIENT)
Dept: PHARMACY | Facility: CLINIC | Age: 57
End: 2017-12-11

## 2017-12-11 ENCOUNTER — HOSPITAL ENCOUNTER (OUTPATIENT)
Dept: MRI IMAGING | Facility: CLINIC | Age: 57
Discharge: HOME | End: 2017-12-11
Attending: PHYSICIAN ASSISTANT | Admitting: PHYSICIAN ASSISTANT
Payer: COMMERCIAL

## 2017-12-11 VITALS
OXYGEN SATURATION: 94 % | RESPIRATION RATE: 16 BRPM | TEMPERATURE: 98.4 F | DIASTOLIC BLOOD PRESSURE: 83 MMHG | HEIGHT: 78 IN | HEART RATE: 81 BPM | WEIGHT: 315 LBS | BODY MASS INDEX: 36.45 KG/M2 | SYSTOLIC BLOOD PRESSURE: 142 MMHG

## 2017-12-11 DIAGNOSIS — M25.511 ACUTE PAIN OF RIGHT SHOULDER: ICD-10-CM

## 2017-12-11 DIAGNOSIS — C15.5 CANCER OF DISTAL THIRD OF ESOPHAGUS (H): Primary | ICD-10-CM

## 2017-12-11 LAB
ALBUMIN SERPL-MCNC: 3.7 G/DL (ref 3.4–5)
ALP SERPL-CCNC: 115 U/L (ref 40–150)
ALT SERPL W P-5'-P-CCNC: 31 U/L (ref 0–70)
ANION GAP SERPL CALCULATED.3IONS-SCNC: 9 MMOL/L (ref 3–14)
AST SERPL W P-5'-P-CCNC: 34 U/L (ref 0–45)
BASOPHILS # BLD AUTO: 0 10E9/L (ref 0–0.2)
BASOPHILS NFR BLD AUTO: 0.4 %
BILIRUB SERPL-MCNC: 0.8 MG/DL (ref 0.2–1.3)
BUN SERPL-MCNC: 13 MG/DL (ref 7–30)
CALCIUM SERPL-MCNC: 9.2 MG/DL (ref 8.5–10.1)
CHLORIDE SERPL-SCNC: 107 MMOL/L (ref 94–109)
CO2 SERPL-SCNC: 24 MMOL/L (ref 20–32)
CREAT SERPL-MCNC: 0.85 MG/DL (ref 0.66–1.25)
DIFFERENTIAL METHOD BLD: ABNORMAL
EOSINOPHIL # BLD AUTO: 0 10E9/L (ref 0–0.7)
EOSINOPHIL NFR BLD AUTO: 0.5 %
ERYTHROCYTE [DISTWIDTH] IN BLOOD BY AUTOMATED COUNT: 14.4 % (ref 10–15)
GFR SERPL CREATININE-BSD FRML MDRD: >90 ML/MIN/1.7M2
GLUCOSE SERPL-MCNC: 101 MG/DL (ref 70–99)
HCT VFR BLD AUTO: 42.8 % (ref 40–53)
HGB BLD-MCNC: 14.4 G/DL (ref 13.3–17.7)
IMM GRANULOCYTES # BLD: 0 10E9/L (ref 0–0.4)
IMM GRANULOCYTES NFR BLD: 0.4 %
LYMPHOCYTES # BLD AUTO: 1.5 10E9/L (ref 0.8–5.3)
LYMPHOCYTES NFR BLD AUTO: 26.9 %
MCH RBC QN AUTO: 31 PG (ref 26.5–33)
MCHC RBC AUTO-ENTMCNC: 33.6 G/DL (ref 31.5–36.5)
MCV RBC AUTO: 92 FL (ref 78–100)
MONOCYTES # BLD AUTO: 0.4 10E9/L (ref 0–1.3)
MONOCYTES NFR BLD AUTO: 8 %
NEUTROPHILS # BLD AUTO: 3.5 10E9/L (ref 1.6–8.3)
NEUTROPHILS NFR BLD AUTO: 63.8 %
NRBC # BLD AUTO: 0 10*3/UL
NRBC BLD AUTO-RTO: 0 /100
PLATELET # BLD AUTO: 147 10E9/L (ref 150–450)
POTASSIUM SERPL-SCNC: 4 MMOL/L (ref 3.4–5.3)
PROT SERPL-MCNC: 7.8 G/DL (ref 6.8–8.8)
RBC # BLD AUTO: 4.64 10E12/L (ref 4.4–5.9)
SODIUM SERPL-SCNC: 140 MMOL/L (ref 133–144)
WBC # BLD AUTO: 5.5 10E9/L (ref 4–11)

## 2017-12-11 PROCEDURE — 99212 OFFICE O/P EST SF 10 MIN: CPT | Mod: ZF

## 2017-12-11 PROCEDURE — 96411 CHEMO IV PUSH ADDL DRUG: CPT

## 2017-12-11 PROCEDURE — 96375 TX/PRO/DX INJ NEW DRUG ADDON: CPT

## 2017-12-11 PROCEDURE — 25000128 H RX IP 250 OP 636: Mod: ZF | Performed by: INTERNAL MEDICINE

## 2017-12-11 PROCEDURE — 80053 COMPREHEN METABOLIC PANEL: CPT | Performed by: PHYSICIAN ASSISTANT

## 2017-12-11 PROCEDURE — 25000128 H RX IP 250 OP 636: Mod: ZF | Performed by: PHYSICIAN ASSISTANT

## 2017-12-11 PROCEDURE — 96368 THER/DIAG CONCURRENT INF: CPT

## 2017-12-11 PROCEDURE — 85025 COMPLETE CBC W/AUTO DIFF WBC: CPT | Performed by: PHYSICIAN ASSISTANT

## 2017-12-11 PROCEDURE — 96415 CHEMO IV INFUSION ADDL HR: CPT

## 2017-12-11 PROCEDURE — 73221 MRI JOINT UPR EXTREM W/O DYE: CPT | Mod: RT

## 2017-12-11 PROCEDURE — 96367 TX/PROPH/DG ADDL SEQ IV INF: CPT

## 2017-12-11 PROCEDURE — 99214 OFFICE O/P EST MOD 30 MIN: CPT | Mod: ZP | Performed by: INTERNAL MEDICINE

## 2017-12-11 PROCEDURE — 96413 CHEMO IV INFUSION 1 HR: CPT

## 2017-12-11 PROCEDURE — 96416 CHEMO PROLONG INFUSE W/PUMP: CPT

## 2017-12-11 RX ORDER — PALONOSETRON 0.05 MG/ML
0.25 INJECTION, SOLUTION INTRAVENOUS ONCE
Status: COMPLETED | OUTPATIENT
Start: 2017-12-11 | End: 2017-12-11

## 2017-12-11 RX ORDER — DEXAMETHASONE 4 MG/1
TABLET ORAL
Qty: 6 TABLET | Refills: 1 | Status: SHIPPED | OUTPATIENT
Start: 2017-12-11 | End: 2018-01-08

## 2017-12-11 RX ORDER — HEPARIN SODIUM (PORCINE) LOCK FLUSH IV SOLN 100 UNIT/ML 100 UNIT/ML
5 SOLUTION INTRAVENOUS EVERY 8 HOURS
Status: DISCONTINUED | OUTPATIENT
Start: 2017-12-11 | End: 2017-12-13 | Stop reason: HOSPADM

## 2017-12-11 RX ORDER — FLUOROURACIL 50 MG/ML
400 INJECTION, SOLUTION INTRAVENOUS ONCE
Status: COMPLETED | OUTPATIENT
Start: 2017-12-11 | End: 2017-12-11

## 2017-12-11 RX ADMIN — OXALIPLATIN 173 MG: 5 INJECTION INTRAVENOUS at 15:00

## 2017-12-11 RX ADMIN — PALONOSETRON HYDROCHLORIDE 0.25 MG: 0.25 INJECTION INTRAVENOUS at 14:28

## 2017-12-11 RX ADMIN — LEUCOVORIN CALCIUM 1050 MG: 350 INJECTION, POWDER, LYOPHILIZED, FOR SOLUTION INTRAMUSCULAR; INTRAVENOUS at 14:59

## 2017-12-11 RX ADMIN — FLUOROURACIL 1220 MG: 50 INJECTION, SOLUTION INTRAVENOUS at 17:07

## 2017-12-11 RX ADMIN — DEXAMETHASONE SODIUM PHOSPHATE 150 MG: 10 INJECTION, SOLUTION INTRAMUSCULAR; INTRAVENOUS at 14:32

## 2017-12-11 RX ADMIN — SODIUM CHLORIDE, PRESERVATIVE FREE 5 ML: 5 INJECTION INTRAVENOUS at 13:01

## 2017-12-11 RX ADMIN — DEXTROSE MONOHYDRATE 250 ML: 5 INJECTION, SOLUTION INTRAVENOUS at 14:26

## 2017-12-11 ASSESSMENT — PAIN SCALES - GENERAL: PAINLEVEL: MILD PAIN (3)

## 2017-12-11 NOTE — MR AVS SNAPSHOT
After Visit Summary   12/11/2017    Reuben Padilla    MRN: [id number]           Patient Information     Date Of Birth          1960        Visit Information        Provider Department      12/11/2017 1:00 PM Kadi Dee MD [facility]        Today's Diagnoses     Cancer of distal third of esophagus (H)    -  1       Follow-ups after your visit        Your next 10 appointments already scheduled     Dec 26, 2017  8:45 AM CST   [facility] Draw with  [facility] DRAW   [facility] ([facility])    [address] [place] [address]   [phone]            Dec 26, 2017  9:20 AM CST   (Arrive by 9:05 AM)   Return Visit with Katalina Ramirez PA-C   [facility] ([facility])    [address] [place] [address]   [phone]            Dec 26, 2017 10:30 AM CST   Infusion 240 with  ONCOLOGY INFUSION, UC 21 ATC   [facility] ([facility])    [address] [place] [address]   [phone]            Jan 08, 2018  8:30 AM CST   [facility] Lab Draw with  [facility] LAB DRAW   [facility])    [address] [place] [address]   [phone]            Jan 08, 2018  9:00 AM CST   (Arrive by 8:45 AM)   CT CHEST/ABDOMEN/PELVIS W CONTRAST with UCCT2   [facility] [place] [facility] ([facility])    [address] [place] [address]   [phone]           Please bring any scans or X-rays taken at other hospitals, if similar tests were done. Also bring a list of your medicines, including vitamins, minerals and over-the-counter drugs. It is safest to leave personal items at home.  Be sure to tell your doctor:   If you have any allergies.   If there s any  chance you are pregnant.   If you are breastfeeding.   If you have any special needs.  You may have contrast for this exam. To prepare:   Do not eat or drink for 2 hours before your exam. If you need to take medicine, you may take it with small sips of water. (We may ask you to take liquid medicine as well.)   The day before your exam, drink extra fluids at least six 8-ounce glasses (unless your doctor tells you to restrict your fluids).  Patients over 70 or patients with diabetes or kidney problems:   If you haven t had a blood test (creatinine test) within the last 30 days, go to your clinic or Diagnostic Imaging Department for this test.  If you have diabetes:   If your kidney function is normal, continue taking your metformin (Avandamet, Glucophage, Glucovance, Metaglip) on the day of your exam.   If your kidney function is abnormal, wait 48 hours before restarting this medicine.  You will have oral contrast for this exam:   You will drink the contrast at home. Get this from your clinic or Diagnostic Imaging Department. Please follow the directions given.  Please wear loose clothing, such as a sweat suit or jogging clothes. Avoid snaps, zippers and other metal. We may ask you to undress and put on a hospital gown.  If you have any questions, please call the Imaging Department where you will have your exam.            Jan 08, 2018 12:00 PM CST   (Arrive by 11:45 AM)   Return Visit with Kadi Dee MD   [facility] ([facility])    [address] [place] [address]  2nd [facility] [address] [phone]            Jan 08, 2018  1:00 PM CST   Infusion 240 with UC ONCOLOGY INFUSION, UC 10 ATC   [facility] ([facility])    [address] [place] [address] [phone]              Who to contact     If you have questions or need follow up information about today's clinic visit or your schedule  "please contact [facility] directly at [phone].  Normal or non-critical lab and imaging results will be communicated to you by MyChart, letter or phone within 4 business days after the clinic has received the results. If you do not hear from us within 7 days, please contact the clinic through [employer] or phone. If you have a critical or abnormal lab result, we will notify you by phone as soon as possible.  Submit refill requests through [employer] or call your pharmacy and they will forward the refill request to us. Please allow 3 business days for your refill to be completed.          Additional Information About Your Visit        [employer] Information     [employer] gives you secure access to your electronic health record. If you see a primary care provider, you can also send messages to your care team and make appointments. If you have questions, please call your primary care clinic.  If you do not have a primary care provider, please call [phone] and they will assist you.        Care EveryWhere ID     This is your Care EveryWhere ID. This could be used by other organizations to access your [place] medical records  [id number]        Your Vitals Were     Pulse Temperature Respirations Height Pulse Oximetry BMI (Body Mass Index)    81 98.4  F (36.9  C) 16 1.981 m (6' 5.99\") 94% 40.76 kg/m2       Blood Pressure from Last 3 Encounters:   12/12/17 138/86   12/11/17 142/83   11/28/17 140/75    Weight from Last 3 Encounters:   12/12/17 (!) 159.2 kg (351 lb)   12/11/17 (!) 159.9 kg (352 lb 9.6 oz)   11/28/17 (!) 163.1 kg (359 lb 9.6 oz)              Today, you had the following     No orders found for display         Today's Medication Changes          These changes are accurate as of: 12/11/17 11:59 PM.  If you have any questions, ask your nurse or doctor.               These medicines have changed or have updated prescriptions.        Dose/Directions    * dexamethasone 4 MG tablet   Commonly " known as:  DECADRON   This may have changed:  Another medication with the same name was added. Make sure you understand how and when to take each.   Used for:  Cancer of distal third of esophagus (H)        Take two tablets daily on days 2,3 and then 1 tablet daily days 4,5   Quantity:  6 tablet   Refills:  1       * dexamethasone 4 MG tablet   Commonly known as:  DECADRON   This may have changed:  You were already taking a medication with the same name, and this prescription was added. Make sure you understand how and when to take each.   Used for:  Cancer of distal third of esophagus (H)        Take two tablets daily on days 2,3 and then 1 tablet daily days 4,5   Quantity:  6 tablet   Refills:  1       * Notice:  This list has 2 medication(s) that are the same as other medications prescribed for you. Read the directions carefully, and ask your doctor or other care provider to review them with you.         Where to get your medicines      These medications were sent to [address] [place] [address] [address] [place] [address]    Hours:  TRANSPLANT PHONE NUMBER [phone] Phone:  [phone]     dexamethasone 4 MG tablet                Primary Care Provider Fax #    Physician No Ref-Primary [phone]       No address on file        Equal Access to Services     OLLIE SHARPE : Eliza navarroo Germán, waaxda luqadaha, qaybta kaalmada adeegyada, geraldo cardenas. So [facility] [phone].    ATENCIÓN: Si habla español, tiene a alamo disposición servicios gratuitos de asistencia lingüística. Llame al [phone].    We comply with applicable federal civil rights laws and [place] laws. We do not discriminate on the basis of race, color, national origin, age, disability, sex, sexual orientation, or gender identity.            Thank you!     Thank you for choosing [facility]  for your care. Our  goal is always to provide you with excellent care. Hearing back from our patients is one way we can continue to improve our services. Please take a few minutes to complete the written survey that you may receive in the mail after your visit with us. Thank you!             Your Updated Medication List - Protect others around you: Learn how to safely use, store and throw away your medicines at www.disposemymeds.org.          This list is accurate as of: 12/11/17 11:59 PM.  Always use your most recent med list.                   Brand Name Dispense Instructions for use Diagnosis    * dexamethasone 4 MG tablet    DECADRON    6 tablet    Take two tablets daily on days 2,3 and then 1 tablet daily days 4,5    Cancer of distal third of esophagus (H)       * dexamethasone 4 MG tablet    DECADRON    6 tablet    Take two tablets daily on days 2,3 and then 1 tablet daily days 4,5    Cancer of distal third of esophagus (H)       fish oil-omega-3 fatty acids 1000 MG capsule      Take 2 g by mouth daily        lidocaine-prilocaine cream    EMLA    30 g    Apply topically as needed for moderate pain    Cancer of distal third of esophagus (H)       LORazepam 0.5 MG tablet    ATIVAN    60 tablet    Take 1 tablet (0.5 mg) by mouth every 6 hours as needed for anxiety    Cancer of distal third of esophagus (H), 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       Multi-vitamin Tabs tablet      Take 1 tablet by mouth daily        ondansetron 8 MG tablet    ZOFRAN    30 tablet    Take 1 tablet (8 mg) by mouth every 8 hours as needed (Nausea/Vomiting)    Cancer of distal third of esophagus (H)       prochlorperazine 10 MG tablet    COMPAZINE    30 tablet    Take 1 tablet (10 mg) by mouth every 6 hours as needed (Nausea/Vomiting)    Cancer of distal third of esophagus (H)       rivaroxaban ANTICOAGULANT 20 MG Tabs tablet    XARELTO    90 tablet    Take 1 tablet (20 mg) by mouth daily (with dinner)    Persistent atrial  fibrillation (H), Other pulmonary embolism without acute cor pulmonale, unspecified chronicity (H), Cancer of distal third of esophagus (H)       timolol 0.5 % ophthalmic solution    TIMOPTIC     Place into both eyes daily    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 Read the directions carefully, and ask your doctor or other care provider to review them with you.

## 2017-12-11 NOTE — NURSING NOTE
"Oncology Rooming Note    December 11, 2017 1:23 PM   Reuben Padilla is a 57 year old male who presents for:    Chief Complaint   Patient presents with     Port Draw     accessed with power needle for labs, heparin locked, vitals checked     Oncology Clinic Visit     Follow up-Cancer Esophagus     Initial Vitals: /83  Pulse 81  Temp 98.4  F (36.9  C)  Resp 16  Ht 1.981 m (6' 5.99\")  Wt (!) 159.9 kg (352 lb 9.6 oz)  SpO2 94%  BMI 40.76 kg/m2 Estimated body mass index is 40.76 kg/(m^2) as calculated from the following:    Height as of this encounter: 1.981 m (6' 5.99\").    Weight as of this encounter: 159.9 kg (352 lb 9.6 oz). Body surface area is 2.97 meters squared.  Mild Pain (3) Comment: Data Unavailable   No LMP for male patient.  Allergies reviewed: Yes  Medications reviewed: Yes    Medications: MEDICATION REFILLS NEEDED TODAY. Provider was notified.  Pharmacy name entered into [employer]:    [id number] - [place], [place] - [address] [place] [address] AT INTERSECTION [address] [place] [address] PHARMACY [place], [place] - [address] [facility] [phone]    Clinical concerns: Refills on Ambien and Decadron Dr. Dee was notified.    10 minutes for nursing intake (face to face time)     Kamilah Shi LPN              "

## 2017-12-11 NOTE — PATIENT INSTRUCTIONS
Contact Numbers  [facility] Nurse Triage: [phone]  After Hours Nurse Line:  [phone]    Please call the [facility] Triage line if you experience a temperature greater than or equal to 100.5, shaking chills, have uncontrolled nausea, vomiting and/or diarrhea, dizziness, shortness of breath, chest pain, bleeding, unexplained bruising, or if you have any other new/concerning symptoms, questions or concerns.     If it is after hours, weekends, or holidays, please call either the after hours nurse line listed above.    If you are having any concerning symptoms or wish to speak to a provider before your next infusion visit, please call your care coordinator or triage to notify them so we can adequately serve you.     If you need a refill on a narcotic prescription or other medication, please call triage before your infusion appointment.         December 2017 Sunday Monday Tuesday Wednesday Thursday Friday Saturday                            1     2       3     4     5     6     7     8     9       10     11     MR UPR EXT JOINT RIGHT WWO   11:00 AM   (60 min.)   UUMR2   [facility] [place] [facility] LAB DRAW   12:30 PM   (15 min.)    [facility] LAB DRAW   [facility] Lab Draw     [facility] RETURN   12:45 PM   (30 min.)   Kadi Dee MD   [facility] ONC INFUSION 240    1:30 PM   (240 min.)    ONCOLOGY INFUSION   [facility] 12     13     14     15     16       17     18     19     20     21     22     23       24     25     26     [facility] LAB DRAW    8:45 AM   (15 min.)    [facility] LAB DRAW   [facility] Lab Draw     [facility] RETURN    9:05 AM   (50 min.)   Katalina Ramirez PA-C   [facility] ONC INFUSION 240   10:30 AM   (240 min.)    ONCOLOGY INFUSION   [facility] 27     28     29     30       31 January 2018 Sunday Monday Tuesday  Wednesday Thursday Friday Saturday        1     2     3     4     5     6       7     8     [facility] LAB DRAW    8:30 AM   (15 min.)    [facility] LAB DRAW   [facility] Lab Draw     CT CHEST/ABDOMEN/PELVIS W    8:45 AM   (20 min.)   UCCT2   [facility] CT     [facility] RETURN   11:45 AM   (30 min.)   Kadi Dee MD   [facility] ONC INFUSION 240    1:00 PM   (240 min.)    ONCOLOGY INFUSION   [facility] 9     10     11     12     13       14     15     16     17     18     19     20       21     22     23     24     25     26     27       28     29     30     31                                 Lab Results:  Recent Results (from the past 12 hour(s))   CBC with platelets differential    Collection Time: 12/11/17  1:14 PM   Result Value Ref Range    WBC 5.5 4.0 - 11.0 10e9/L    RBC Count 4.64 4.4 - 5.9 10e12/L    Hemoglobin 14.4 13.3 - 17.7 g/dL    Hematocrit 42.8 40.0 - 53.0 %    MCV 92 78 - 100 fl    MCH 31.0 26.5 - 33.0 pg    MCHC 33.6 31.5 - 36.5 g/dL    RDW 14.4 10.0 - 15.0 %    Platelet Count 147 (L) 150 - 450 10e9/L    Diff Method Automated Method     % Neutrophils 63.8 %    % Lymphocytes 26.9 %    % Monocytes 8.0 %    % Eosinophils 0.5 %    % Basophils 0.4 %    % Immature Granulocytes 0.4 %    Nucleated RBCs 0 0 /100    Absolute Neutrophil 3.5 1.6 - 8.3 10e9/L    Absolute Lymphocytes 1.5 0.8 - 5.3 10e9/L    Absolute Monocytes 0.4 0.0 - 1.3 10e9/L    Absolute Eosinophils 0.0 0.0 - 0.7 10e9/L    Absolute Basophils 0.0 0.0 - 0.2 10e9/L    Abs Immature Granulocytes 0.0 0 - 0.4 10e9/L    Absolute Nucleated RBC 0.0    Comprehensive metabolic panel    Collection Time: 12/11/17  1:14 PM   Result Value Ref Range    Sodium 140 133 - 144 mmol/L    Potassium 4.0 3.4 - 5.3 mmol/L    Chloride 107 94 - 109 mmol/L    Carbon Dioxide 24 20 - 32 mmol/L    Anion Gap 9 3 - 14 mmol/L    Glucose 101 (H) 70 - 99 mg/dL    Urea Nitrogen 13 7 - 30 mg/dL    Creatinine  0.85 0.66 - 1.25 mg/dL    GFR Estimate >90 >60 mL/min/1.7m2    GFR Estimate If Black >90 >60 mL/min/1.7m2    Calcium 9.2 8.5 - 10.1 mg/dL    Bilirubin Total 0.8 0.2 - 1.3 mg/dL    Albumin 3.7 3.4 - 5.0 g/dL    Protein Total 7.8 6.8 - 8.8 g/dL    Alkaline Phosphatase 115 40 - 150 U/L    ALT 31 0 - 70 U/L    AST 34 0 - 45 U/L

## 2017-12-11 NOTE — PROGRESS NOTES
Infusion Nursing Note:  Reuben Padilla presents today for D1 C14 Oxaliplatin, Leucovorin, Fluorouracil push and pump connect.    Patient seen by provider today: Yes: Dr. Dee    Intravenous Access:  Implanted Port.    Treatment Conditions:  Lab Results   Component Value Date    HGB 14.4 12/11/2017     Lab Results   Component Value Date    WBC 5.5 12/11/2017      Lab Results   Component Value Date    ANEU 3.5 12/11/2017     Lab Results   Component Value Date     12/11/2017      Lab Results   Component Value Date     12/11/2017                   Lab Results   Component Value Date    POTASSIUM 4.0 12/11/2017           No results found for: MAG         Lab Results   Component Value Date    CR 0.85 12/11/2017                   Lab Results   Component Value Date    NIDHI 9.2 12/11/2017                Lab Results   Component Value Date    BILITOTAL 0.8 12/11/2017           Lab Results   Component Value Date    ALBUMIN 3.7 12/11/2017                    Lab Results   Component Value Date    ALT 31 12/11/2017           Lab Results   Component Value Date    AST 34 12/11/2017     Results reviewed, labs MET treatment parameters, ok to proceed with treatment.    Note: Fluorouracil Cseries infusion pump connected at 1715.  Positive blood return from port at time of pump hook up. Connections open and taped, heat sensor on; verified with  Lily Mahoney RN. Pump to infuse over 46 hours at 5.2cc/hour. Pump will be disconnected on Wednesday 12/13 at 1515 by [facility]; date and time verified with Alexus.  Patient aware of pump disconnect date and time.    Post Infusion Assessment:  Patient tolerated infusion without incident.  Blood return noted pre and post infusion.  Blood return noted during Fluorouracil administration every 2 cc.  Site patent and intact, free from redness, edema or discomfort.  No evidence of extravasations.    Discharge Plan:   Prescription refills given for ambien and decadron.  Discharge instructions  reviewed with: Patient and Family.  Patient and/or family verbalized understanding of discharge instructions and all questions answered.  Copy of AVS reviewed with patient and/or family.  Patient will return 12/26/17 for next appointment.  Patient discharged in stable condition accompanied by: self and family.  Departure Mode: Ambulatory.    Nieves Bhandari RN

## 2017-12-11 NOTE — MR AVS SNAPSHOT
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     If it is after hours, weekends, or holidays, please call either the after hours nurse line listed above.    If you are having any concerning symptoms or wish to speak to a provider before your next infusion visit, please call your care coordinator or triage to notify them so we can adequately serve you.     If you need a refill on a narcotic prescription or other medication, please call triage before your infusion appointment.         December 2017 Sunday Monday Tuesday Wednesday Thursday Friday Saturday                            1     2       3     4     5     6     7     8     9       10     11     MR UPR EXT JOINT RIGHT WWO   11:00 AM   (60 min.)   UUMR2   [facility] LAB DRAW   12:30 PM   (15 min.)   [facility] DRAW   [facility] Lab Draw     [facility] RETURN   12:45 PM   (30 min.)   Kadi Dee MD   [facility] ONC INFUSION 240    1:30 PM   (240 min.)    ONCOLOGY INFUSION   [facility] 12     13     14     15     16       17     18     19     20     21     22     23       24     25     26     [facility] LAB DRAW     8:45 AM   (15 min.)    [facility] LAB DRAW   [facility] Lab Draw     [facility] RETURN    9:05 AM   (50 min.)   Katalina Ramirez PA-C   [facility] 240   10:30 AM   (240 min.)    ONCOLOGY INFUSION   [facility] 27     28     29     30       31                                              January 2018 Sunday Monday Tuesday Wednesday Thursday Friday Saturday        1     2     3     4     5     6       7     8     [facility] LAB DRAW    8:30 AM   (15 min.)    [facility] LAB DRAW   [facility] Lab Draw     CT CHEST/ABDOMEN/PELVIS W    8:45 AM   (20 min.)   UCCT2   [facility] CT     [facility] RETURN   11:45 AM   (30 min.)   Kadi Dee MD   [facility] ONC INFUSION 240    1:00 PM   (240 min.)    ONCOLOGY INFUSION   [facility] 9     10     11     12     13       14     15     16     17     18     19     20       21     22     23     24     25     26     27       28     29     30     31                                 Lab Results:  Recent Results (from the past 12 hour(s))   CBC with platelets differential    Collection Time: 12/11/17  1:14 PM   Result Value Ref Range    WBC 5.5 4.0 - 11.0 10e9/L    RBC Count 4.64 4.4 - 5.9 10e12/L    Hemoglobin 14.4 13.3 - 17.7 g/dL    Hematocrit 42.8 40.0 - 53.0 %    MCV 92 78 - 100 fl    MCH 31.0 26.5 - 33.0 pg    MCHC 33.6 31.5 - 36.5 g/dL    RDW 14.4 10.0 - 15.0 %    Platelet Count 147 (L) 150 - 450 10e9/L    Diff Method Automated Method     % Neutrophils 63.8 %    % Lymphocytes 26.9 %    % Monocytes 8.0 %    % Eosinophils 0.5 %    % Basophils 0.4 %    % Immature Granulocytes 0.4 %    Nucleated RBCs 0 0 /100    Absolute Neutrophil 3.5 1.6 - 8.3 10e9/L    Absolute Lymphocytes 1.5 0.8 - 5.3 10e9/L    Absolute Monocytes 0.4 0.0 - 1.3 10e9/L    Absolute Eosinophils 0.0 0.0 - 0.7 10e9/L    Absolute Basophils 0.0 0.0 - 0.2 10e9/L    Abs Immature  Granulocytes 0.0 0 - 0.4 10e9/L    Absolute Nucleated RBC 0.0    Comprehensive metabolic panel    Collection Time: 12/11/17  1:14 PM   Result Value Ref Range    Sodium 140 133 - 144 mmol/L    Potassium 4.0 3.4 - 5.3 mmol/L    Chloride 107 94 - 109 mmol/L    Carbon Dioxide 24 20 - 32 mmol/L    Anion Gap 9 3 - 14 mmol/L    Glucose 101 (H) 70 - 99 mg/dL    Urea Nitrogen 13 7 - 30 mg/dL    Creatinine 0.85 0.66 - 1.25 mg/dL    GFR Estimate >90 >60 mL/min/1.7m2    GFR Estimate If Black >90 >60 mL/min/1.7m2    Calcium 9.2 8.5 - 10.1 mg/dL    Bilirubin Total 0.8 0.2 - 1.3 mg/dL    Albumin 3.7 3.4 - 5.0 g/dL    Protein Total 7.8 6.8 - 8.8 g/dL    Alkaline Phosphatase 115 40 - 150 U/L    ALT 31 0 - 70 U/L    AST 34 0 - 45 U/L               Follow-ups after your visit        Your next 10 appointments already scheduled     Dec 26, 2017  8:45 AM CST   [facility] Draw with  [facility] DRAW   [facility] [facility]    [address] [place] [id number] [phone]            Dec 26, 2017  9:20 AM CST   (Arrive by 9:05 AM)   Return Visit with Katalina Ramirez PA-C   [facility])    [address] [place] [address] [phone]            Dec 26, 2017 10:30 AM CST   Infusion 240 with UC ONCOLOGY INFUSION, UC 21 ATC   [facility] ([place] [facility])    [address] [place] [address]   [phone]            Jan 08, 2018  8:30 AM CST   [facility] Lab Draw with  MASONIC LAB DRAW   [facility] Lab Draw ([place] [facility])    [address] [place] [address]   [phone]            Jan 08, 2018  9:00 AM CST   (Arrive by 8:45 AM)   CT CHEST/ABDOMEN/PELVIS W CONTRAST with UCCT2   [facility] CT ([facility])    [address] [facility]  [address] [address] [phone]           Please bring any scans or X-rays taken at other hospitals, if similar tests were done. Also bring a list of your medicines, including vitamins, minerals and over-the-counter drugs. It is safest to leave personal items at home.  Be sure to tell your doctor:   If you have any allergies.   If there s any chance you are pregnant.   If you are breastfeeding.   If you have any special needs.  You may have contrast for this exam. To prepare:   Do not eat or drink for 2 hours before your exam. If you need to take medicine, you may take it with small sips of water. (We may ask you to take liquid medicine as well.)   The day before your exam, drink extra fluids at least six 8-ounce glasses (unless your doctor tells you to restrict your fluids).  Patients over 70 or patients with diabetes or kidney problems:   If you haven t had a blood test (creatinine test) within the last 30 days, go to your clinic or Diagnostic Imaging Department for this test.  If you have diabetes:   If your kidney function is normal, continue taking your metformin (Avandamet, Glucophage, Glucovance, Metaglip) on the day of your exam.   If your kidney function is abnormal, wait 48 hours before restarting this medicine.  You will have oral contrast for this exam:   You will drink the contrast at home. Get this from your clinic or Diagnostic Imaging Department. Please follow the directions given.  Please wear loose clothing, such as a sweat suit or jogging clothes. Avoid snaps, zippers and other metal. We may ask you to undress and put on a hospital gown.  If you have any questions, please call the Imaging Department where you will have your exam.            Jan 08, 2018 12:00 PM CST   (Arrive by 11:45 AM)   Return Visit with Kadi Dee MD   [facility] ([facility])    [address] [address]    [phone]            Jan 08, 2018  1:00 PM CST   Infusion 240 with UC ONCOLOGY INFUSION, UC 10 ATC   [facility] ([facility])    [address] [facility]  2nd Floor  [facility] [address] [phone]              Future tests that were ordered for you today     Open Future Orders        Priority Expected Expires Ordered    MR Up Ext Joint Rt w/o Contrast Routine  11/28/2018 11/28/2017            Who to contact     If you have questions or need follow up information about today's clinic visit or your schedule please contact [facility]CER [facility] directly at [phone].  Normal or non-critical lab and imaging results will be communicated to you by [employer], letter or phone within 4 business days after the clinic has received the results. If you do not hear from us within 7 days, please contact the clinic through [employer] or phone. If you have a critical or abnormal lab result, we will notify you by phone as soon as possible.  Submit refill requests through [employer] or call your pharmacy and they will forward the refill request to us. Please allow 3 business days for your refill to be completed.          Additional Information About Your Visit        [employer] [facility] Information     [employer] gives you secure access to your electronic health record. If you see a primary care provider, you can also send messages to your care team and make appointments. If you have questions, please call your primary care clinic.  If you do not have a primary care provider, please call [phone] and they will assist you.        Care EveryWhere ID     This is your Care EveryWhere ID. This could be used by other organizations to access your [facility] medical records  [id number]         Blood Pressure from Last 3 Encounters:   12/11/17 142/83   11/28/17 140/75   11/13/17 134/82    Weight from Last 3 Encounters:   12/11/17 (!) 159.9 kg (352 lb 9.6 oz)   11/28/17 (!) 163.1 kg (359 lb 9.6  oz)   11/13/17 (!) 163.8 kg (361 lb 3.2 oz)              We Performed the Following     CBC with platelets differential     Comprehensive metabolic panel          Today's Medication Changes          These changes are accurate as of: 12/11/17  5:26 PM.  If you have any questions, ask your nurse or doctor.               These medicines have changed or have updated prescriptions.        Dose/Directions    * dexamethasone 4 MG tablet   Commonly known as:  DECADRON   This may have changed:  Another medication with the same name was added. Make sure you understand how and when to take each.   Used for:  Cancer of distal third of esophagus (H)        Take two tablets daily on days 2,3 and then 1 tablet daily days 4,5   Quantity:  6 tablet   Refills:  1       * dexamethasone 4 MG tablet   Commonly known as:  DECADRON   This may have changed:  You were already taking a medication with the same name, and this prescription was added. Make sure you understand how and when to take each.   Used for:  Cancer of distal third of esophagus (H)        Take two tablets daily on days 2,3 and then 1 tablet daily days 4,5   Quantity:  6 tablet   Refills:  1       * Notice:  This list has 2 medication(s) that are the same as other medications prescribed for you. Read the directions carefully, and ask your doctor or other care provider to review them with you.         Where to get your medicines      These medications were sent to [address] [address]    Hours:  TRANSPLANT PHONE NUMBER [phone] Phone:  [phone]     dexamethasone 4 MG tablet                Primary Care Provider Fax #    Physician No Ref-Primary [phone]       No address on file        Equal Access to Services     CARLITOS SHARPE : Eliza Dunlap, yeyo davsi, geraldo dumont . So [facility]  [phone].    ATENCIÓN: Si alayna rodriguez, tiene a alamo disposición servicios gratuitos de asistencia lingüística. Tanisha anderson [phone].    We comply with applicable federal civil rights laws and [place] laws. We do not discriminate on the basis of race, color, national origin, age, disability, sex, sexual orientation, or gender identity.            Thank you!     Thank you for choosing [facility] CLINIC  for your care. Our goal is always to provide you with excellent care. Hearing back from our patients is one way we can continue to improve our services. Please take a few minutes to complete the written survey that you may receive in the mail after your visit with us. Thank you!             Your Updated Medication List - Protect others around you: Learn how to safely use, store and throw away your medicines at www.disposemymeds.org.          This list is accurate as of: 12/11/17  5:26 PM.  Always use your most recent med list.                   Brand Name Dispense Instructions for use Diagnosis    * dexamethasone 4 MG tablet    DECADRON    6 tablet    Take two tablets daily on days 2,3 and then 1 tablet daily days 4,5    Cancer of distal third of esophagus (H)       * dexamethasone 4 MG tablet    DECADRON    6 tablet    Take two tablets daily on days 2,3 and then 1 tablet daily days 4,5    Cancer of distal third of esophagus (H)       fish oil-omega-3 fatty acids 1000 MG capsule      Take 2 g by mouth daily        lidocaine-prilocaine cream    EMLA    30 g    Apply topically as needed for moderate pain    Cancer of distal third of esophagus (H)       LORazepam 0.5 MG tablet    ATIVAN    60 tablet    Take 1 tablet (0.5 mg) by mouth every 6 hours as needed for anxiety    Cancer of distal third of esophagus (H), 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       Multi-vitamin Tabs tablet      Take 1 tablet by mouth daily        ondansetron 8 MG tablet    ZOFRAN    30 tablet    Take 1  tablet (8 mg) by mouth every 8 hours as needed (Nausea/Vomiting)    Cancer of distal third of esophagus (H)       prochlorperazine 10 MG tablet    COMPAZINE    30 tablet    Take 1 tablet (10 mg) by mouth every 6 hours as needed (Nausea/Vomiting)    Cancer of distal third of esophagus (H)       rivaroxaban ANTICOAGULANT 20 MG Tabs tablet    XARELTO    90 tablet    Take 1 tablet (20 mg) by mouth daily (with dinner)    Persistent atrial fibrillation (H), Other pulmonary embolism without acute cor pulmonale, unspecified chronicity (H), Cancer of distal third of esophagus (H)       timolol 0.5 % ophthalmic solution    TIMOPTIC     Place into both eyes daily    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 Read the directions carefully, and ask your doctor or other care provider to review them with you.

## 2017-12-11 NOTE — LETTER
"12/11/2017       RE: Reuben Padilla  [address] [place] [address]     Dear Colleague,    Thank you for referring your patient, Reuben Padilla, to the [facility]. Please see a copy of my visit note below.    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 His tumor is positive for cytokeratin 20, negative for P63 and CK7, and HER2 is negative. He started on FOLFOX (5FU/oxaliplatin) on 5/15/2017. He had a delay in treatment from 9/5-10/2/17 due to work related injury.    INTERVAL HISTORY:   Reuben is here with his sister and son for follow-up.  The chemotherapy has been going much better from a nausea standpoint with the dexamethasone added for days 2-5. He did have more diarrhea than usual. This was during the second week and lasted for 3-4 days. Controlled with Imodium and stayed hydrated.     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 Able to button his shirt, doesn't affect his functioning. Neuropathy in his feet is stable though this extends to his calfs.      No f/c or cough/cold symptoms. Still has pain in the ribs from his traumatic accident a couple months ago, exacerbated when sitting up from lying position. He does not feel he needs any pain medications. He also complains of back pain that is chronic for him.    He has questions today regarding a second opinion.    A complete 10-pt ROS is negative other than what is reported above      PHYSICAL EXAMINATION  /83  Pulse 81  Temp 98.4  F (36.9  C)  Resp 16  Ht 1.981 m (6' 5.99\")  Wt (!) 159.9 kg (352 lb 9.6 oz)  SpO2 94%  BMI 40.76 kg/m2 /90  Wt Readings from Last 4 Encounters:   12/12/17 (!) 159.2 kg (351 lb)   12/11/17 (!) 159.9 kg (352 lb 9.6 oz)   11/28/17 (!) 163.1 kg (359 lb 9.6 oz) "   11/13/17 (!) 163.8 kg (361 lb 3.2 oz)     Constitutional: Alert, oriented male in no visible distress.  Eyes: PERRL. Anicteric sclerae.  ENT/Mouth: OM moist and pink without lesions or thrush.  CV: RRR  Resp: CTAB throughout  Abdomen: Soft, non-tender, non-distended. Obese. Bowel sounds present. Unable to palpate liver or spleen.   Extremities: No lower extremity edema   Skin: Warm, dry. mild venous stasis changes on LE bilat.  No spine tenderness  Lymph: No cervical or supraclavicular lymphadenopathy appreciated.   Neuro: CN II-XII grossly intact.  Able to walk on tiptoes and heels.  Absent reflexed at knees bilaterally    LABS:  Lab Results   Component Value Date    WBC 5.5 12/11/2017     Lab Results   Component Value Date    RBC 4.64 12/11/2017     Lab Results   Component Value Date    HGB 14.4 12/11/2017     Lab Results   Component Value Date    HCT 42.8 12/11/2017     No components found for: MCT  Lab Results   Component Value Date    MCV 92 12/11/2017     Lab Results   Component Value Date    MCH 31.0 12/11/2017     Lab Results   Component Value Date    MCHC 33.6 12/11/2017     Lab Results   Component Value Date    RDW 14.4 12/11/2017     Lab Results   Component Value Date     12/11/2017       Recent Labs   Lab Test  12/11/17   1314  11/28/17   1155   NA  140  142   POTASSIUM  4.0  4.0   CHLORIDE  107  109   CO2  24  26   ANIONGAP  9  6   GLC  101*  95   BUN  13  15   CR  0.85  0.87   NIDHI  9.2  9.2     Liver Function Studies -   Recent Labs   Lab Test  12/11/17   1314   PROTTOTAL  7.8   ALBUMIN  3.7   BILITOTAL  0.8   ALKPHOS  115   AST  34   ALT  31       IMPRESSION/PLAN:  1. Metastatic esophogeal cancer. On FOLFOX since May 2017, with dose reduction of oxaliplatin due to neuropathy. He had partial response on imaging in September. Treatment was held in September due to a traumatic work-related injury resulting in multiple rib and spine fractures. He resumed chemotherapy on 10/2 and is tolerating  therapy reasonably well and labs are adequate.     Proceed with chemo today. Continue every 2 weeks with BERENICE appt before each cycle.     I discussed with him that it is very reasonable to get a second opinion.  He has a PET scheduled at [place] later this week.      We discussed that with disease progression, I would recommend biopsy.  We do not have enough tissue for PD1 testing.  I also think it would be reasonable to consider FOundation One testing.      If he has disease progression then Cyramza/Taxol is 2nd line (if neuropathy allows for Taxol). We will need to rebiopsy for PD-L1 testing at some point, since Keytruda is now approved for 3rd line and tissue we have now is not adequate for testing.      2. Nausea. Improved with Emend and Decadron.     3. Incidental PE: Identified on restaging 7/10/17. He continues on Xarelto. Will continue to monitor PLT.      4. Neuropathy - will continue to need to be monitored closely.  I anticipate if he will not be able to tolerate much more oxaliplatin.    Kadi Dee

## 2017-12-12 ENCOUNTER — OFFICE VISIT (OUTPATIENT)
Dept: ORTHOPEDICS | Facility: CLINIC | Age: 57
End: 2017-12-12

## 2017-12-12 VITALS
HEIGHT: 78 IN | SYSTOLIC BLOOD PRESSURE: 138 MMHG | WEIGHT: 315 LBS | DIASTOLIC BLOOD PRESSURE: 86 MMHG | BODY MASS INDEX: 36.45 KG/M2

## 2017-12-12 DIAGNOSIS — M25.511 ACUTE PAIN OF RIGHT SHOULDER: ICD-10-CM

## 2017-12-12 NOTE — PROGRESS NOTES
This is a recent snapshot of the patient's [facility] medical record.  For current drug dose and complete information and questions, call [phone] or In Basket pool, [facility] home infusion ([id number])  CSN Number:  [id number]
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
## 2017-12-12 NOTE — MR AVS SNAPSHOT
After Visit Summary   12/12/2017    Reuben Padilla    MRN: [id number]           Patient Information     Date Of Birth          1960        Visit Information        Provider Department      12/12/2017 10:00 AM Willie Wood DO [facility] Sports and Orthopaedic Walk In Clinic        Today's Diagnoses     Tear of right supraspinatus tendon, initial encounter    -  1       Follow-ups after your visit        Additional Services     PHYSICAL THERAPY REFERRAL (External-Prints)       Physical Therapy Referral                  Follow-up notes from your care team     Return in about 1 month (around 1/12/2018).      Your next 10 appointments already scheduled     Dec 26, 2017  8:45 AM CST   Masonic Lab Draw with UC MASONIC LAB DRAW   [facility] Masonic Lab Draw ([facility])    [address] [place] [address]   [phone]            Dec 26, 2017  9:20 AM CST   (Arrive by 9:05 AM)   Return Visit with Katalina Ramirez PA-C   [facility] ([facility])    [address] [place] [id number]   [phone]            Dec 26, 2017 10:30 AM CST   Infusion 240 with UC ONCOLOGY INFUSION, UC 21 ATC   [facility] ([facility])    [address] [place] [address]   [phone]            Jan 08, 2018  8:30 AM CST   [facility] Lab Draw with UC MASONIC LAB DRAW   [facility])    [address] [place] [address]   [phone]            Jan 08, 2018  9:00 AM CST   (Arrive by 8:45 AM)   CT CHEST/ABDOMEN/PELVIS W CONTRAST with UCCT2   [facility] ([facility])    [address] [place] [address]  [address] [facility] [id number] [phone]           Please bring any scans or X-rays taken at other hospitals, if similar tests  were done. Also bring a list of your medicines, including vitamins, minerals and over-the-counter drugs. It is safest to leave personal items at home.  Be sure to tell your doctor:   If you have any allergies.   If there s any chance you are pregnant.   If you are breastfeeding.   If you have any special needs.  You may have contrast for this exam. To prepare:   Do not eat or drink for 2 hours before your exam. If you need to take medicine, you may take it with small sips of water. (We may ask you to take liquid medicine as well.)   The day before your exam, drink extra fluids at least six 8-ounce glasses (unless your doctor tells you to restrict your fluids).  Patients over 70 or patients with diabetes or kidney problems:   If you haven t had a blood test (creatinine test) within the last 30 days, go to your clinic or Diagnostic Imaging Department for this test.  If you have diabetes:   If your kidney function is normal, continue taking your metformin (Avandamet, Glucophage, Glucovance, Metaglip) on the day of your exam.   If your kidney function is abnormal, wait 48 hours before restarting this medicine.  You will have oral contrast for this exam:   You will drink the contrast at home. Get this from your clinic or Diagnostic Imaging Department. Please follow the directions given.  Please wear loose clothing, such as a sweat suit or jogging clothes. Avoid snaps, zippers and other metal. We may ask you to undress and put on a hospital gown.  If you have any questions, please call the Imaging Department where you will have your exam.            Jan 08, 2018 12:00 PM CST   (Arrive by 11:45 AM)   Return Visit with Kadi Dee MD   [facility] ([facility])    [address]  2nd Floor  [facility] [address] [phone]            Jan 08, 2018  1:00 PM CST   Infusion 240 with UC ONCOLOGY INFUSION, UC 10 ATC   [facility] ([facility]    [address]  [address] [employer] [address] [phone]              Future tests that were ordered for you today     Open Future Orders        Priority Expected Expires Ordered    MR Up Ext Joint Rt w/o Contrast Routine  11/28/2018 11/28/2017            Who to contact     Please call your clinic at [phone] to:    Ask questions about your health    Make or cancel appointments    Discuss your medicines    Learn about your test results    Speak to your doctor   If you have compliments or concerns about an experience at your clinic, or if you wish to file a complaint, please contact [facility] Patient Relations at [phone] or email us at [email or web address]         Additional Information About Your Visit        [employer] Information     [employer] gives you secure access to your electronic health record. If you see a primary care provider, you can also send messages to your care team and make appointments. If you have questions, please call your primary care clinic.  If you do not have a primary care provider, please call [phone] and they will assist you.      [employer] is an electronic gateway that provides easy, online access to your medical records. With [employer], you can request a clinic appointment, read your test results, renew a prescription or communicate with your care team.     To access your existing account, please contact your [facility] or call [phone] for assistance.        Care EveryWhere ID     This is your Care EveryWhere ID. This could be used by other organizations to access your [place] medical records  [id number]        Your Vitals Were     Height BMI (Body Mass Index)                6' 5.99\" (1.981 m) 40.57 kg/m2           Blood Pressure from Last 3 Encounters:   12/12/17 138/86   12/11/17 142/83   11/28/17 140/75    Weight from Last 3 Encounters:   12/12/17 (!) 351 lb " (159.2 kg)   12/11/17 (!) 352 lb 9.6 oz (159.9 kg)   11/28/17 (!) 359 lb 9.6 oz (163.1 kg)              We Performed the Following     PHYSICAL THERAPY REFERRAL (External-Prints)        Primary Care Provider Fax #    Physician No Ref-Primary [phone]       No address on file        Equal Access to Services     CARLITOS [facility]: Hadii aad ku hadasho Soomaali, waaxda luqadaha, qaybta kaalmada adeegyada, waxrachel vernon valorien jacquielauryn munson lasantyotilio . So [facility] [phone].    ATENCIÓN: Si habla español, tiene a alamo disposición servicios gratuitos de asistencia lingüística. Llame al [phone].    We comply with applicable federal civil rights laws and [place] laws. We do not discriminate on the basis of race, color, national origin, age, disability, sex, sexual orientation, or gender identity.            Thank you!     Thank you for choosing [facility] SPORTS AND ORTHOPAEDIC WALK IN CLINIC  for your care. Our goal is always to provide you with excellent care. Hearing back from our patients is one way we can continue to improve our services. Please take a few minutes to complete the written survey that you may receive in the mail after your visit with us. Thank you!             Your Updated Medication List - Protect others around you: Learn how to safely use, store and throw away your medicines at www.disposemymeds.org.          This list is accurate as of: 12/12/17 10:51 AM.  Always use your most recent med list.                   Brand Name Dispense Instructions for use Diagnosis    * dexamethasone 4 MG tablet    DECADRON    6 tablet    Take two tablets daily on days 2,3 and then 1 tablet daily days 4,5    Cancer of distal third of esophagus (H)       * dexamethasone 4 MG tablet    DECADRON    6 tablet    Take two tablets daily on days 2,3 and then 1 tablet daily days 4,5    Cancer of distal third of esophagus (H)       fish oil-omega-3 fatty acids 1000 MG capsule      Take 2 g by mouth daily        lidocaine-prilocaine  cream    EMLA    30 g    Apply topically as needed for moderate pain    Cancer of distal third of esophagus (H)       LORazepam 0.5 MG tablet    ATIVAN    60 tablet    Take 1 tablet (0.5 mg) by mouth every 6 hours as needed for anxiety    Cancer of distal third of esophagus (H), 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       Multi-vitamin Tabs tablet      Take 1 tablet by mouth daily        ondansetron 8 MG tablet    ZOFRAN    30 tablet    Take 1 tablet (8 mg) by mouth every 8 hours as needed (Nausea/Vomiting)    Cancer of distal third of esophagus (H)       prochlorperazine 10 MG tablet    COMPAZINE    30 tablet    Take 1 tablet (10 mg) by mouth every 6 hours as needed (Nausea/Vomiting)    Cancer of distal third of esophagus (H)       rivaroxaban ANTICOAGULANT 20 MG Tabs tablet    XARELTO    90 tablet    Take 1 tablet (20 mg) by mouth daily (with dinner)    Persistent atrial fibrillation (H), Other pulmonary embolism without acute cor pulmonale, unspecified chronicity (H), Cancer of distal third of esophagus (H)       timolol 0.5 % ophthalmic solution    TIMOPTIC     Place into both eyes daily    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 Read the directions carefully, and ask your doctor or other care provider to review them with you.
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
## 2017-12-13 ENCOUNTER — HOME INFUSION (PRE-WILLOW HOME INFUSION) (OUTPATIENT)
Dept: PHARMACY | Facility: CLINIC | Age: 57
End: 2017-12-13
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
## 2017-12-14 ENCOUNTER — TRANSFERRED RECORDS (OUTPATIENT)
Dept: HEALTH INFORMATION MANAGEMENT | Facility: CLINIC | Age: 57
End: 2017-12-14

## 2017-12-14 NOTE — PROGRESS NOTES
This is a recent snapshot of the patient's [facility] medical record.  For current drug dose and complete information and questions, call [phone] or [facility] ([id number])  CSN Number:  [id number]

## 2017-12-19 ENCOUNTER — CARE COORDINATION (OUTPATIENT)
Dept: ONCOLOGY | Facility: CLINIC | Age: 57
End: 2017-12-19

## 2017-12-19 ENCOUNTER — TELEPHONE (OUTPATIENT)
Dept: ONCOLOGY | Facility: CLINIC | Age: 57
End: 2017-12-19

## 2017-12-19 DIAGNOSIS — I26.99 OTHER PULMONARY EMBOLISM WITHOUT ACUTE COR PULMONALE, UNSPECIFIED CHRONICITY (H): Primary | ICD-10-CM

## 2017-12-19 DIAGNOSIS — C15.5 CANCER OF DISTAL THIRD OF ESOPHAGUS (H): Primary | ICD-10-CM

## 2017-12-19 NOTE — PROGRESS NOTES
Spoke with Reuben in regards to the lab test that Dr Dee is requesting.   He will need to have test drawn in [place] at [address] 3rd  on the 3rd floor. Unfortunately the clinics local to him do not run their own labs in house and the Heparin Xa has to be processed within one hour of drawing.  Orders and face sheet faxed to [facility] at [phone]  Reuben is in agreement with plan and knows to have the lab drawn 4-6 hours after his lovenox injection.

## 2017-12-20 LAB — HEPARIN 10A LEVEL: 1.59

## 2017-12-21 DIAGNOSIS — K76.9 LIVER LESION: Primary | ICD-10-CM

## 2017-12-21 DIAGNOSIS — I82.409 DVT (DEEP VENOUS THROMBOSIS) (H): ICD-10-CM

## 2017-12-21 DIAGNOSIS — C15.5 CANCER OF DISTAL THIRD OF ESOPHAGUS (H): Primary | ICD-10-CM

## 2017-12-21 NOTE — PROGRESS NOTES
Spoke with Reuben regarding his Heparin Xa results.  He injected lovenox at 8:00 am and had the lab drawn between 12:30-1:00  Requested he decrease his lovenox dose to 130mg twice a day, discussed that he will need to waste drug in order to administer the correct dose.  He will have labs drawn on Tuesday after his consult with Danielle Jackson.  Answered several question on the reason he is having the biopsy now instead of waiting for a few more treatments.  Discussed PDL-1 and Keytruda side effects of he is found to be PDL-1 positive.  Answered all his questions to his stated satisfaction.  Encouraged him to call with additional questions or concerns.

## 2017-12-22 ASSESSMENT — ENCOUNTER SYMPTOMS
EXERCISE INTOLERANCE: 1
SPEECH CHANGE: 0
BLOOD IN STOOL: 0
LEG PAIN: 0
JOINT SWELLING: 0
MEMORY LOSS: 1
NECK PAIN: 0
ARTHRALGIAS: 0
POLYPHAGIA: 0
NERVOUS/ANXIOUS: 0
WEAKNESS: 1
INCREASED ENERGY: 1
ORTHOPNEA: 0
DIARRHEA: 1
CONSTIPATION: 1
ALTERED TEMPERATURE REGULATION: 1
SLEEP DISTURBANCES DUE TO BREATHING: 0
SYNCOPE: 0
BLOATING: 0
RECTAL PAIN: 0
PALPITATIONS: 1
JAUNDICE: 0
HYPERTENSION: 0
PARALYSIS: 0
LIGHT-HEADEDNESS: 0
WEIGHT GAIN: 0
MUSCLE WEAKNESS: 1
CHILLS: 1
FATIGUE: 1
MUSCLE CRAMPS: 0
DECREASED CONCENTRATION: 1
TREMORS: 0
MYALGIAS: 1
WEIGHT LOSS: 0
DISTURBANCES IN COORDINATION: 1
DIZZINESS: 1
STIFFNESS: 0
BACK PAIN: 0
HYPOTENSION: 0
SWOLLEN GLANDS: 0
VOMITING: 0
NIGHT SWEATS: 0
HALLUCINATIONS: 0
FEVER: 0
DECREASED APPETITE: 0
HEARTBURN: 0
TINGLING: 1
HEADACHES: 0
ABDOMINAL PAIN: 0
BOWEL INCONTINENCE: 0
NUMBNESS: 1
INSOMNIA: 1
POLYDIPSIA: 0
NAUSEA: 0
DEPRESSION: 0
PANIC: 0
BRUISES/BLEEDS EASILY: 1
SEIZURES: 0

## 2017-12-26 ENCOUNTER — TELEPHONE (OUTPATIENT)
Dept: INTERVENTIONAL RADIOLOGY/VASCULAR | Facility: CLINIC | Age: 57
End: 2017-12-26

## 2017-12-26 ENCOUNTER — OFFICE VISIT (OUTPATIENT)
Dept: INTERVENTIONAL RADIOLOGY/VASCULAR | Facility: CLINIC | Age: 57
End: 2017-12-26
Payer: COMMERCIAL

## 2017-12-26 ENCOUNTER — RADIANT APPOINTMENT (OUTPATIENT)
Dept: ULTRASOUND IMAGING | Facility: CLINIC | Age: 57
End: 2017-12-26
Attending: CLINICAL NURSE SPECIALIST
Payer: COMMERCIAL

## 2017-12-26 VITALS — SYSTOLIC BLOOD PRESSURE: 119 MMHG | OXYGEN SATURATION: 97 % | DIASTOLIC BLOOD PRESSURE: 78 MMHG | HEART RATE: 94 BPM

## 2017-12-26 DIAGNOSIS — K76.9 LIVER LESION: Primary | ICD-10-CM

## 2017-12-26 DIAGNOSIS — K76.9 LIVER LESION: ICD-10-CM

## 2017-12-26 DIAGNOSIS — C15.5 CANCER OF DISTAL THIRD OF ESOPHAGUS (H): ICD-10-CM

## 2017-12-26 DIAGNOSIS — I82.409 DVT (DEEP VENOUS THROMBOSIS) (H): ICD-10-CM

## 2017-12-26 DIAGNOSIS — C15.9 PRIMARY CANCER OF ESOPHAGUS WITH METASTASIS TO OTHER SITE (H): ICD-10-CM

## 2017-12-26 PROBLEM — I26.99 ACUTE PULMONARY EMBOLISM (H): Status: ACTIVE | Noted: 2017-12-14

## 2017-12-26 PROBLEM — M75.101 TEAR OF RIGHT SUPRASPINATUS TENDON: Status: ACTIVE | Noted: 2017-12-26

## 2017-12-26 LAB — LMWH PPP CHRO-ACNC: 1.02 IU/ML

## 2017-12-26 PROCEDURE — 85520 HEPARIN ASSAY: CPT | Performed by: INTERNAL MEDICINE

## 2017-12-26 NOTE — MR AVS SNAPSHOT
After Visit Summary   12/26/2017    Reuben Padilla    MRN: [id number]           Patient Information     Date Of Birth          1960        Visit Information        Provider Department      12/26/2017 11:00 AM Danielle Jackson APRN CNS [facility] Interventional Radiology        Today's Diagnoses     Liver lesion    -  1    Primary cancer of esophagus with metastasis to other site (H)          Care Instructions    You are scheduled for your biopsy on Wednesday, June 27, 2017   Report to the [facility] Waiting room at 10:00 AM  The [facility] Waiting Room is located on the 2nd floor (street level) of the [facility], [address] [place] [address].  Your procedure is scheduled to start at approximately 11:30 AM    No solid foods or milk products for 6 hours prior on the day of the procedure.5:30 AM  You may have clear liquids until 2 hours prior on the day of the procedure.(water, apple juice, broth, coffee or tea without milk or sugar, jell-o, white grape juice) 9:30 AM    Hold the Lovenox the night before the morning of the rpcoedure    You will need a     If you have any questions you may call the Radiology Nurse Line [phone]          Follow-ups after your visit        Your next 10 appointments already scheduled     Dec 26, 2017 12:45 PM CST   Masonic Lab Draw with  MASONIC LAB DRAW   [facility] Masonic Lab Draw ([facility])    [address] [id number] [phone]            Dec 27, 2017 10:00 AM CST   Procedure 4.5 hour with U2A ROOM 12   Unit 2A [facility] ([facility])    [address] [facility] [address]               Dec 27, 2017 11:30 AM CST   (Arrive by 11:15 AM)   IR LIVER BIOPSY PERCUTANEOUS with UUIR6   [facility] Interventional Radiology ([facility])    [address] [facility] [id number] [phone]            1. Laboratory test are to be obtained by your doctor prior to the exam (CBCP, INR and PTT) 2. Someone will need to drive you to and from the hospital. 3. If you are or may be pregnant, contact your doctor or a Radiology nurse prior to the day of the exam. 4. If you have diabetes, check with your doctor or a Radiology nurse to see if your insulin needs to be adjusted for the exam. 5. If you are taking Coumadin (to thin you blood) please contact your doctor or a Radiology nurse at least 3 days before the exam for special instructions. 6. The day before your exam you may eat your regular diet and are encouraged to drink at least 2 quarts of clear liquids. Drink no alcoholic beverages for 24 hours prior to the exam. 7. Do not eat any solid food or milk products for 6 hours prior to the exam. You may drink clear liquids until 2 hours prior to the exam. Clear liquids include the following: water, Jell-O, clear broth, apple juice or any noncarbonated drink that you can see through (no pop!) 8. The morning of the exam you may brush your teeth and take medications as directed with a sip of water. 9. Tell the Radiology nurse if you have any allergies. 10. You will be asked to empty your bladder before the exam begins. 11. You may resume your normal activities the day after the exam. Do not do any strenuous exercises or lifting for 48 hours. 12. If a drainage tube is to remain in place, your doctor s office will need to make arrangements for you to learn how to care for this tube. Ask them about this before the date of the exam. You may need to obtain supplies from your local pharmacy. Please make an appointment before leaving your current appointment. You should understand the plan for your care prior to leaving this appointment            Jan 08, 2018  8:30 AM [facility]   Masonic Lab Draw with  MASONIC LAB DRAW   [facility] Masonic Lab Draw ([facility])    [address]  [address]   [phone]            Jan 08, 2018  9:00 AM CST   (Arrive by 8:45 AM)   CT CHEST/ABDOMEN/PELVIS W CONTRAST with UCCT2   [facility] ([facility])    [address] [facility]  [address] [facility] [address] [phone]           Please bring any scans or X-rays taken at other hospitals, if similar tests were done. Also bring a list of your medicines, including vitamins, minerals and over-the-counter drugs. It is safest to leave personal items at home.  Be sure to tell your doctor:   If you have any allergies.   If there s any chance you are pregnant.   If you are breastfeeding.   If you have any special needs.  You may have contrast for this exam. To prepare:   Do not eat or drink for 2 hours before your exam. If you need to take medicine, you may take it with small sips of water. (We may ask you to take liquid medicine as well.)   The day before your exam, drink extra fluids at least six 8-ounce glasses (unless your doctor tells you to restrict your fluids).  Patients over 70 or patients with diabetes or kidney problems:   If you haven t had a blood test (creatinine test) within the last 30 days, go to your clinic or Diagnostic Imaging Department for this test.  If you have diabetes:   If your kidney function is normal, continue taking your metformin (Avandamet, Glucophage, Glucovance, Metaglip) on the day of your exam.   If your kidney function is abnormal, wait 48 hours before restarting this medicine.  You will have oral contrast for this exam:   You will drink the contrast at home. Get this from your clinic or Diagnostic Imaging Department. Please follow the directions given.  Please wear loose clothing, such as a sweat suit or jogging clothes. Avoid snaps, zippers and other metal. We may ask you to undress and put on a hospital gown.  If you have any questions, please call the Imaging Department where you will have your exam.            Jan 08, 2018  12:00 PM CST   (Arrive by 11:45 AM)   Return Visit with Kadi Dee MD   [facility] ([facility])    [address] [facility]  2nd Floor  [facility] [address] [phone]            Jan 08, 2018  1:00 PM CST   Infusion 240 with UC ONCOLOGY INFUSION, UC 10 ATC   [facility] ([facility])    [address] [facility]  2nd Floor  [facility] [address] [phone]              Future tests that were ordered for you today     Open Future Orders        Priority Expected Expires Ordered    IR Liver Biopsy Percutaneous Routine  12/26/2018 12/26/2017            Who to contact     Please call your clinic at [phone] to:    Ask questions about your health    Make or cancel appointments    Discuss your medicines    Learn about your test results    Speak to your doctor   If you have compliments or concerns about an experience at your clinic, or if you wish to file a complaint, please contact [facility] Patient Relations at [phone] or email us at [email or web address]         Additional Information About Your Visit        Moko Social MediaharFatwire Information     Secret Spacet gives you secure access to your electronic health record. If you see a primary care provider, you can also send messages to your care team and make appointments. If you have questions, please call your primary care clinic.  If you do not have a primary care provider, please call [phone] and they will assist you.      Pradama is an electronic gateway that provides easy, online access to your medical records. With Pradama, you can request a clinic appointment, read your test results, renew a prescription or communicate with your care team.     To access your existing account, please contact your [facility] Clinic or call [phone] for assistance.        Care EveryWhere ID     This is your Care EveryWhere ID.  This could be used by other organizations to access your [facility] medical records  [id number]        Your Vitals Were     Pulse Pulse Oximetry                94 97%           Blood Pressure from Last 3 Encounters:   12/26/17 119/78   12/12/17 138/86   12/11/17 142/83    Weight from Last 3 Encounters:   12/12/17 (!) 159.2 kg (351 lb)   12/11/17 (!) 159.9 kg (352 lb 9.6 oz)   11/28/17 (!) 163.1 kg (359 lb 9.6 oz)                 Today's Medication Changes          These changes are accurate as of: 12/26/17 11:26 AM.  If you have any questions, ask your nurse or doctor.               Stop taking these medicines if you haven't already. Please contact your care team if you have questions.     rivaroxaban ANTICOAGULANT 20 MG Tabs tablet   Commonly known as:  XARELTO   Stopped by:  Danielle Jackson, PADMA CNS                    Primary Care Provider Fax #    Physician No Ref-Primary [phone]       No address on file        Equal Access to Services     [facility]: Hadii antonio harrison hadasho Soomaali, waaxda luqadaha, qaybta kaalmada adeegyada, waxay vernon washington . So [facility] [phone].    ATENCIÓN: Si habla español, tiene a alamo disposición servicios gratuitos de asistencia lingüística. Llame al [phone].    We comply with applicable federal civil rights laws and [place] laws. We do not discriminate on the basis of race, color, national origin, age, disability, sex, sexual orientation, or gender identity.            Thank you!     Thank you for choosing [facility] INTERVENTIONAL RADIOLOGY  for your care. Our goal is always to provide you with excellent care. Hearing back from our patients is one way we can continue to improve our services. Please take a few minutes to complete the written survey that you may receive in the mail after your visit with us. Thank you!             Your Updated Medication List - Protect others around you: Learn how to safely use, store and throw away your medicines at  www.disposemymeds.org.          This list is accurate as of: 12/26/17 11:26 AM.  Always use your most recent med list.                   Brand Name Dispense Instructions for use Diagnosis    * dexamethasone 4 MG tablet    DECADRON    6 tablet    Take two tablets daily on days 2,3 and then 1 tablet daily days 4,5    Cancer of distal third of esophagus (H)       * dexamethasone 4 MG tablet    DECADRON    6 tablet    Take two tablets daily on days 2,3 and then 1 tablet daily days 4,5    Cancer of distal third of esophagus (H)       fish oil-omega-3 fatty acids 1000 MG capsule      Take 2 g by mouth daily        lidocaine-prilocaine cream    EMLA    30 g    Apply topically as needed for moderate pain    Cancer of distal third of esophagus (H)       LORazepam 0.5 MG tablet    ATIVAN    60 tablet    Take 1 tablet (0.5 mg) by mouth every 6 hours as needed for anxiety    Cancer of distal third of esophagus (H), 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       LOVENOX SC      Inject 130 mg Subcutaneous 2 times daily        Multi-vitamin Tabs tablet      Take 1 tablet by mouth daily        ondansetron 8 MG tablet    ZOFRAN    30 tablet    Take 1 tablet (8 mg) by mouth every 8 hours as needed (Nausea/Vomiting)    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 Read the directions carefully, and ask your doctor or other care provider to review them with you.

## 2017-12-26 NOTE — PATIENT INSTRUCTIONS
You are scheduled for your biopsy on Wednesday, December 27, 2017   [facility] at 10:00 AM  [facility] is located on the 2nd floor (street level) of the [address]  Your procedure is scheduled to start at approximately 11:30 AM    No solid foods or milk products for 6 hours prior on the day of the procedure.5:30 AM  You may have clear liquids until 2 hours prior on the day of the procedure.(water, apple juice, broth, coffee or tea without milk or sugar, jell-o, white grape juice) 9:30 AM    Hold the Lovenox the night before the morning of the rpcoedure    You will need a     If you have any questions you may call the Radiology Nurse Line [phone]

## 2017-12-26 NOTE — LETTER
12/26/2017       RE: Reuben Padilla  [address] [facility] [address]     Dear Colleague,    Thank you for referring your patient, Reuben Padilla, to the [facility] INTERVENTIONAL RADIOLOGY at [facility]. Please see a copy of my visit note below.    First Name: Reuben   Age: 57 year old   Referring Physician: Dr. Dee   REASON FOR REFERRAL: Consult for liver lesion biopsy    Assessment:  Reuben is a 56 yo with stage IV esophageal cancer.  He has been on treatment with FOLFOX since that time.  He has a new liver lesion and biopsy is requested.    Plan:  Image guided biopsy of 1.7 x 1.5 cm, segment 5 liver lesion.  In addition to pathology send for PDL-1 testing and Foundation One testing  Patient is being followed with Factor Xa testing with Lovenox (no need to draw an INR level)  Patient will hold is Lovenox the night before and the morning of the procedure      HPI: This is a patient that initially noticed symptoms of dysphagia, mainly to solid food, starting in February of 2017.  Symptoms got progressively worse.  He was unable to eat solid food, and he needed to take a lot of fluid to push food down.  His dysphagia symptoms prompted subsequent evaluation with upper endoscopy and colonoscopy that was done in [place]. The colonoscopy revealed two tubular adenomas in the colon.  The upper endoscopy revealed an ulcerated mass 1-2 cm long, approximately 40 cm from the lips.  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   The patient was seen by Dr. Clemencia Carreon initially who ordered an ENT evaluation and supraclavicular lymph node FNA to rule out head and neck etiology of the cancer, since outside pathology was reported as SCC. The pathology slides were requested from [facility] for review by the [facility] Pathology Department. Our diagnosis is invasive  poorly differentiated  adenocarcinoma.  The FNA of the supraclavicular LN was done that confirmed poorly differentiated adenocarcinoma of GI origin.  The tumor was P63 negative and CK7 negative.  Positive for cytokeratin 20.  The patient also had a PET/CT scan done that revealed widespread lavon metastasis and multiple liver mets that were consistent with metastatic disease. HER2 is negative.   Reuben started FOLFOX on 5/15/17.  He is morbidly obese, has a hx of paroxysmal a-fib-took aspirin, and 20 pk yr hx of smoking, having quit in 2007.  In July he was incidentally found to have a right lower lobe PE and started on Xarelto.  He had a delay in treatment from 9/5-10/2/17 due to work related injury.  He continues with right shoulder pain.  The patient went for a second opinion in mid December and on that imaging he was found to have a larger right lung PE.  HE was switched from Xarelto to Lovenox and he takes this twice a day.  He was also found on this imaging to have a 1.7 x 1.5 cm, segment 5 liver lesion,  Biopsy is requested.  Dr. Billingsley from IR reviewed the imaging, requested an ultrasound,  The lesion can be seen on ultrasound.  Dr. Billingsley said if the lesion could be seen on ultrasound, that is how we could perform the biopsy  PAST MEDICAL HISTORY:   Past Medical History:   Diagnosis Date     Cancer (H)     esophageal     Glaucoma      Paroxysmal a-fib (H)      Tubular adenoma 02/2017     PAST SURGICAL HISTORY:   Past Surgical History:   Procedure Laterality Date     AMPUTATION      toe     ARTHROSCOPY KNEE       bunion surgery       CHOLECYSTECTOMY       COLONOSCOPY  02/2017    remove 2 polyps     INSERT PORT VASCULAR ACCESS Right 5/9/2017    Procedure: INSERT PORT VASCULAR ACCESS;  Single Lumen Chest Power Port;  Surgeon: Jasiel Hu PA-C;  Location:  OR     FAMILY HISTORY: No family history on file.  SOCIAL HISTORY:   Social History   Substance Use Topics     Smoking status: Former Smoker     Packs/day:  1.00     Years: 20.00     Types: Cigarettes     Quit date: 1/1/2007     Smokeless tobacco: Never Used     Alcohol use Yes      Comment: occasional     PROBLEM LIST:   Patient Active Problem List    Diagnosis Date Noted     Morbid obesity (H) 12/26/2017     Priority: Medium     Tear of right supraspinatus tendon 12/26/2017     Priority: Medium     ACP (advance care planning) 10/11/2017     Priority: Medium     Acute pulmonary embolism (H) 07/10/2017     Priority: Medium     Cancer of distal third of esophagus (H) 04/25/2017     Priority: Medium     MEDICATIONS:   Prescription Medications as of 12/26/2017             Enoxaparin Sodium (LOVENOX SC) Inject 130 mg Subcutaneous 2 times daily    dexamethasone (DECADRON) 4 MG tablet Take two tablets daily on days 2,3 and then 1 tablet daily days 4,5    dexamethasone (DECADRON) 4 MG tablet Take two tablets daily on days 2,3 and then 1 tablet daily days 4,5    zolpidem (AMBIEN) 10 MG tablet Take 1 tablet (10 mg) by mouth nightly as needed    ondansetron (ZOFRAN) 8 MG tablet Take 1 tablet (8 mg) by mouth every 8 hours as needed (Nausea/Vomiting)    lidocaine-prilocaine (EMLA) cream Apply topically as needed for moderate pain    prochlorperazine (COMPAZINE) 10 MG tablet Take 1 tablet (10 mg) by mouth every 6 hours as needed (Nausea/Vomiting)    LORazepam (ATIVAN) 0.5 MG tablet Take 1 tablet (0.5 mg) by mouth every 6 hours as needed for anxiety    multivitamin, therapeutic with minerals (MULTI-VITAMIN) TABS tablet Take 1 tablet by mouth daily    fish oil-omega-3 fatty acids 1000 MG capsule Take 2 g by mouth daily    timolol (TIMOPTIC) 0.5 % ophthalmic solution Place into both eyes daily        ALLERGIES: Penicillin g and Penicillins  VITALS: /78 (BP Location: Right arm, Patient Position: Sitting, Cuff Size: Adult Large)  Pulse 94  SpO2 97%    ROS:  Answers for HPI/ROS submitted by the patient on 12/22/2017   General Symptoms: Yes  Skin Symptoms: No  HENT Symptoms:  No  EYE SYMPTOMS: No  HEART SYMPTOMS: Yes  LUNG SYMPTOMS: No  INTESTINAL SYMPTOMS: Yes  URINARY SYMPTOMS: No  REPRODUCTIVE SYMPTOMS: No  SKELETAL SYMPTOMS: Yes  BLOOD SYMPTOMS: Yes  NERVOUS SYSTEM SYMPTOMS: Yes  MENTAL HEALTH SYMPTOMS: Yes  Fever: No  Loss of appetite: No  Weight loss: No  Weight gain: No  Fatigue: Yes  Night sweats: No  Chills: Yes  Increased stress: No  Excessive hunger: No  Excessive thirst: No  Feeling hot or cold when others believe the temperature is normal: Yes  Loss of height: No  Post-operative complications: No  Surgical site pain: No  Hallucinations: No  Change in or Loss of Energy: Yes  Hyperactivity: No  Confusion: No  Chest pain or pressure: No  Fast or irregular heartbeat: Yes  Pain in legs with walking: No  Trouble breathing while lying down: No  Fingers or toes appear blue: No  High blood pressure: No  Low blood pressure: No  Fainting: No  Murmurs: No  Pacemaker: No  Varicose veins: Yes  Wake up at night with shortness of breath: No  Light-headedness: No  Exercise intolerance: Yes  Heart burn or indigestion: No  Nausea: No  Vomiting: No  Abdominal pain: No  Bloating: No  Constipation: Yes  Diarrhea: Yes  Blood in stool: No  Black stools: No  Rectal or Anal pain: No  Fecal incontinence: No  Yellowing of skin or eyes: No  Vomit with blood: No  Change in stools: No  Back pain: No  Muscle aches: Yes  Neck pain: No  Swollen joints: No  Joint pain: No  Bone pain: No  Muscle cramps: No  Muscle weakness: Yes  Joint stiffness: No  Bone fracture: No  Anemia: No  Swollen glands: No  Easy bleeding or bruising: Yes  Trouble with coordination: Yes  Dizziness or trouble with balance: Yes  Memory loss: Yes  Headache: No  Seizures: No  Speech problems: No  Tingling: Yes  Tremor: No  Weakness: Yes  Difficulty walking: No  Paralysis: No  Numbness: Yes  Nervous or Anxious: No  Depression: No  Trouble sleeping: Yes  Trouble thinking or concentrating: Yes  Mood changes: No  Panic attacks: No    Physical  Examination: Vital signs are reviewed and they are stable  Constitutional:Middle aged gentleman, in no acute physical distress, came with his sister to the appt  Cardiovascular: RRR  Respiratory: lungs clear  Musculoskeletal: extremities normal- no gross deformities noted, gait normal, normal muscle tone  Skin:  no suspicious lesions or rashes  Neurologic: neuropathy in his finger tips  Psychiatric: mentation appears normal., affect normal/bright    BMP RESULTS:  Lab Results   Component Value Date     12/11/2017    POTASSIUM 4.0 12/11/2017    CHLORIDE 107 12/11/2017    CO2 24 12/11/2017    ANIONGAP 9 12/11/2017     (H) 12/11/2017    BUN 13 12/11/2017    CR 0.85 12/11/2017    GFRESTIMATED >90 12/11/2017    GFRESTBLACK >90 12/11/2017    NIDHI 9.2 12/11/2017        CBC RESULTS:  Lab Results   Component Value Date    WBC 5.5 12/11/2017    RBC 4.64 12/11/2017    HGB 14.4 12/11/2017    HCT 42.8 12/11/2017    MCV 92 12/11/2017    MCH 31.0 12/11/2017    MCHC 33.6 12/11/2017    RDW 14.4 12/11/2017     (L) 12/11/2017       INR/PTT:  Lab Results   Component Value Date    INR 1.16 (H) 05/09/2017    PTT 32 05/09/2017       Diagnostic studies: see outside CT    PROVIDER NOTE:  I explained the procedure to Reuben and his sister.  This included:  Preparing for the procedure, the actual procedure and recovery.  I explained the risks of the liver biopsy:  Bleeding, infection, potential for puncturing the lung, and death related to the procedure.  I explained that usually the results return after three to four business days.    I explained that he/she would be contacted by someone from Dr. Dee's office following this to determine a future plan.  Thank you for involving us in the care of your patient.    30 minutes was spent with Reuben and his sister.  20 minutes was spent in counseling.    Danielle Jackson MS, APRN, CNS, CRN  Clinical Nurse Specialist  Interventional Radiology  [phone] (voice  mail)  [phone] (pager)    CC  Patient Care Team:  No Ref-Primary, Physician as PCP - General  Josefina Wetzel, RN as Nurse Coordinator (Oncology)  Malaika Westfall MD as MD (Hematology)  Katalina Ramirez PA-C as Physician Assistant (Physician Assistant)  Romero Guerrero MD as MD (Clinical Cardiac Electrophysiology)  Arlette Youngblood, RN as Nurse Coordinator (Cardiology)  MALAIKA WESTFALL

## 2017-12-26 NOTE — NURSING NOTE
Chief Complaint   Patient presents with     Lab Only     labs drawn by venipuncture by rn.       Labs drawn by venipuncture by rn.    Gail De La Torre RN

## 2017-12-27 ENCOUNTER — APPOINTMENT (OUTPATIENT)
Dept: MEDSURG UNIT | Facility: CLINIC | Age: 57
End: 2017-12-27
Attending: INTERNAL MEDICINE
Payer: COMMERCIAL

## 2017-12-27 ENCOUNTER — TRANSFERRED RECORDS (OUTPATIENT)
Dept: HEALTH INFORMATION MANAGEMENT | Facility: CLINIC | Age: 57
End: 2017-12-27

## 2017-12-27 ENCOUNTER — APPOINTMENT (OUTPATIENT)
Dept: INTERVENTIONAL RADIOLOGY/VASCULAR | Facility: CLINIC | Age: 57
End: 2017-12-27
Attending: CLINICAL NURSE SPECIALIST
Payer: COMMERCIAL

## 2017-12-27 ENCOUNTER — HOSPITAL ENCOUNTER (OUTPATIENT)
Facility: CLINIC | Age: 57
Discharge: HOME | End: 2017-12-27
Attending: INTERNAL MEDICINE | Admitting: INTERNAL MEDICINE
Payer: COMMERCIAL

## 2017-12-27 VITALS
DIASTOLIC BLOOD PRESSURE: 78 MMHG | HEART RATE: 84 BPM | HEIGHT: 78 IN | TEMPERATURE: 97.6 F | BODY MASS INDEX: 36.45 KG/M2 | RESPIRATION RATE: 16 BRPM | OXYGEN SATURATION: 97 % | SYSTOLIC BLOOD PRESSURE: 120 MMHG | WEIGHT: 315 LBS

## 2017-12-27 DIAGNOSIS — K76.9 LIVER LESION: ICD-10-CM

## 2017-12-27 DIAGNOSIS — C15.9 PRIMARY CANCER OF ESOPHAGUS WITH METASTASIS TO OTHER SITE (H): ICD-10-CM

## 2017-12-27 LAB
BASOPHILS # BLD AUTO: 0 10E9/L (ref 0–0.2)
BASOPHILS NFR BLD AUTO: 0.5 %
DIFFERENTIAL METHOD BLD: ABNORMAL
EOSINOPHIL # BLD AUTO: 0 10E9/L (ref 0–0.7)
EOSINOPHIL NFR BLD AUTO: 1.4 %
ERYTHROCYTE [DISTWIDTH] IN BLOOD BY AUTOMATED COUNT: 14.9 % (ref 10–15)
HCT VFR BLD AUTO: 39.7 % (ref 40–53)
HGB BLD-MCNC: 12.9 G/DL (ref 13.3–17.7)
IMM GRANULOCYTES # BLD: 0 10E9/L (ref 0–0.4)
IMM GRANULOCYTES NFR BLD: 0.5 %
INR PPP: 1.09 (ref 0.86–1.14)
LYMPHOCYTES # BLD AUTO: 0.9 10E9/L (ref 0.8–5.3)
LYMPHOCYTES NFR BLD AUTO: 40.6 %
MCH RBC QN AUTO: 30.7 PG (ref 26.5–33)
MCHC RBC AUTO-ENTMCNC: 32.5 G/DL (ref 31.5–36.5)
MCV RBC AUTO: 95 FL (ref 78–100)
MONOCYTES # BLD AUTO: 0.2 10E9/L (ref 0–1.3)
MONOCYTES NFR BLD AUTO: 11.1 %
NEUTROPHILS # BLD AUTO: 1 10E9/L (ref 1.6–8.3)
NEUTROPHILS NFR BLD AUTO: 45.9 %
NRBC # BLD AUTO: 0 10*3/UL
NRBC BLD AUTO-RTO: 0 /100
PLATELET # BLD AUTO: 92 10E9/L (ref 150–450)
RBC # BLD AUTO: 4.2 10E12/L (ref 4.4–5.9)
WBC # BLD AUTO: 2.2 10E9/L (ref 4–11)

## 2017-12-27 PROCEDURE — 25000128 H RX IP 250 OP 636: Performed by: RADIOLOGY

## 2017-12-27 PROCEDURE — 88307 TISSUE EXAM BY PATHOLOGIST: CPT | Performed by: INTERNAL MEDICINE

## 2017-12-27 PROCEDURE — 88333 PATH CONSLTJ SURG CYTO XM 1: CPT | Performed by: INTERNAL MEDICINE

## 2017-12-27 PROCEDURE — 25000125 ZZHC RX 250: Performed by: PHYSICIAN ASSISTANT

## 2017-12-27 PROCEDURE — 85025 COMPLETE CBC W/AUTO DIFF WBC: CPT | Performed by: RADIOLOGY

## 2017-12-27 PROCEDURE — 88342 IMHCHEM/IMCYTCHM 1ST ANTB: CPT | Performed by: INTERNAL MEDICINE

## 2017-12-27 PROCEDURE — 40000929 ZZHCL STATISTIC FOUNDATION ONE GENE PANEL: Performed by: INTERNAL MEDICINE

## 2017-12-27 PROCEDURE — 27210909 IR LIVER BIOPSY PERCUTANEOUS

## 2017-12-27 PROCEDURE — 25000128 H RX IP 250 OP 636: Performed by: PHYSICIAN ASSISTANT

## 2017-12-27 PROCEDURE — 88341 IMHCHEM/IMCYTCHM EA ADD ANTB: CPT | Performed by: INTERNAL MEDICINE

## 2017-12-27 PROCEDURE — 85610 PROTHROMBIN TIME: CPT | Performed by: RADIOLOGY

## 2017-12-27 PROCEDURE — 40000167 ZZH STATISTIC PP CARE STAGE 2

## 2017-12-27 RX ORDER — NALOXONE HYDROCHLORIDE 0.4 MG/ML
.1-.4 INJECTION, SOLUTION INTRAMUSCULAR; INTRAVENOUS; SUBCUTANEOUS
Status: DISCONTINUED | OUTPATIENT
Start: 2017-12-27 | End: 2017-12-27 | Stop reason: HOSPADM

## 2017-12-27 RX ORDER — FENTANYL CITRATE 50 UG/ML
25-50 INJECTION, SOLUTION INTRAMUSCULAR; INTRAVENOUS EVERY 5 MIN PRN
Status: DISCONTINUED | OUTPATIENT
Start: 2017-12-27 | End: 2017-12-27 | Stop reason: HOSPADM

## 2017-12-27 RX ORDER — FLUMAZENIL 0.1 MG/ML
0.2 INJECTION, SOLUTION INTRAVENOUS
Status: DISCONTINUED | OUTPATIENT
Start: 2017-12-27 | End: 2017-12-27 | Stop reason: HOSPADM

## 2017-12-27 RX ORDER — LIDOCAINE 40 MG/G
CREAM TOPICAL
Status: DISCONTINUED | OUTPATIENT
Start: 2017-12-27 | End: 2017-12-27 | Stop reason: HOSPADM

## 2017-12-27 RX ORDER — SODIUM CHLORIDE 9 MG/ML
INJECTION, SOLUTION INTRAVENOUS CONTINUOUS
Status: DISCONTINUED | OUTPATIENT
Start: 2017-12-27 | End: 2017-12-27 | Stop reason: HOSPADM

## 2017-12-27 RX ORDER — HEPARIN SODIUM (PORCINE) LOCK FLUSH IV SOLN 100 UNIT/ML 100 UNIT/ML
500 SOLUTION INTRAVENOUS ONCE
Status: COMPLETED | OUTPATIENT
Start: 2017-12-27 | End: 2017-12-27

## 2017-12-27 RX ADMIN — LIDOCAINE HYDROCHLORIDE 10 ML: 10 INJECTION, SOLUTION EPIDURAL; INFILTRATION; INTRACAUDAL; PERINEURAL at 11:59

## 2017-12-27 RX ADMIN — SODIUM CHLORIDE: 9 INJECTION, SOLUTION INTRAVENOUS at 10:25

## 2017-12-27 RX ADMIN — SODIUM CHLORIDE, PRESERVATIVE FREE 500 UNITS: 5 INJECTION INTRAVENOUS at 13:50

## 2017-12-27 NOTE — PROCEDURES
Interventional Radiology Brief Post Procedure Note    Procedure: IR LIVER BIOPSY PERCUTANEOUS    Proceduralist: Deedee Guadarrama MD    Assistant: Cooper Randhawa MD    Time Out: Prior to the start of the procedure and with procedural staff participation, I verbally confirmed the patient s identity using two indicators, relevant allergies, that the procedure was appropriate and matched the consent or emergent situation, and that the correct equipment/implants were available. Immediately prior to starting the procedure I conducted the Time Out with the procedural staff and re-confirmed the patient s name, procedure, and site/side. (The Joint Commission universal protocol was followed.)  Yes    Medications   Medication Event Details Admin User Admin Time   lidocaine 1 % 1-30 mL Medication Given Dose: 10 mL; Route: Intradermal Suha Guerra RN 12/27/2017 11:59 AM       Sedation: IR lidocaine      Post Procedure Summary:  Prior to the start of the procedure and with procedural staff participation, I verbally confirmed the patient s identity using two indicators, relevant allergies, that the procedure was appropriate and matched the consent or emergent situation, and that the correct equipment/implants were available. Immediately prior to starting the procedure I conducted the Time Out with the procedural staff and re-confirmed the patient s name, procedure, and site/side. (The Joint Commission universal protocol was followed.)  Yes       Vital signs, airway and pulse oximetry were monitored and remained stable throughout the procedure and sedation was maintained until the procedure was complete.  The patient was monitored by staff until sedation discharge criteria were met.    Patient tolerance: Patient tolerated the procedure well with no immediate complications.    Time of sedation in minutes: 45 Minutes minutes from beginning to end of physician one to one monitoring.      Findings:   Successful liver biopsy  of the right hepatic lobe     Estimated Blood Loss: Minimal    Fluoroscopy Time: 0 minute(s)    SPECIMENS: Core needle biopsy specimens sent for pathological analysis    Complications: 1. None     Condition: Stable    Plan:   - bedrest 1, monitor RUQ biopsy site for signs of bleeding     Comments: See dictated procedure note for full details.    Cooper Randhawa MD

## 2017-12-27 NOTE — IP AVS SNAPSHOT
[address] [address]                                       After Visit Summary   12/27/2017    Reuben Padilla    MRN: [id number]           After Visit Summary Signature Page     I have received my discharge instructions, and my questions have been answered. I have discussed any challenges I see with this plan with the nurse or doctor.    ..........................................................................................................................................  Patient/Patient Representative Signature      ..........................................................................................................................................  Patient Representative Print Name and Relationship to Patient    ..................................................               ................................................  Date                                            Time    ..........................................................................................................................................  Reviewed by Signature/Title    ...................................................              ..............................................  Date                                                            Time

## 2017-12-27 NOTE — IP AVS SNAPSHOT
MRN:[id number]                      After Visit Summary   12/27/2017    Reuben Padilla    MRN: [id number]           Visit Information        Department      12/27/2017  9:34 AM [address] [facility]          Review of your medicines      UNREVIEWED medicines. Ask your doctor about these medicines        Dose / Directions    * dexamethasone 4 MG tablet   Commonly known as:  DECADRON   Used for:  Cancer of distal third of esophagus (H)        Take two tablets daily on days 2,3 and then 1 tablet daily days 4,5   Quantity:  6 tablet   Refills:  1       * dexamethasone 4 MG tablet   Commonly known as:  DECADRON   Used for:  Cancer of distal third of esophagus (H)        Take two tablets daily on days 2,3 and then 1 tablet daily days 4,5   Quantity:  6 tablet   Refills:  1       fish oil-omega-3 fatty acids 1000 MG capsule        Dose:  2 g   Take 2 g by mouth daily   Refills:  0       IBUPROFEN PO        Dose:  600 mg   Take 600 mg by mouth every 4 hours as needed for moderate pain   Refills:  0       lidocaine-prilocaine cream   Commonly known as:  EMLA   Used for:  Cancer of distal third of esophagus (H)        Apply topically as needed for moderate pain   Quantity:  30 g   Refills:  3       LORazepam 0.5 MG tablet   Commonly known as:  ATIVAN   Used for:  Cancer of distal third of esophagus (H), 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        Dose:  0.5 mg   Take 1 tablet (0.5 mg) by mouth every 6 hours as needed for anxiety   Quantity:  60 tablet   Refills:  1       LOVENOX SC   Indication:  Blockage of Blood Vessel to Lung by a Particle        Dose:  130 mg   Inject 130 mg Subcutaneous 2 times daily   Refills:  0       Multi-vitamin Tabs tablet        Dose:  1 tablet   Take 1 tablet by mouth daily   Refills:  0       ondansetron 8 MG tablet   Commonly known as:  ZOFRAN   Used for:  Cancer of distal third of esophagus (H)        Dose:  8 mg   Take 1 tablet (8 mg) by mouth every  8 hours as needed (Nausea/Vomiting)   Quantity:  30 tablet   Refills:  2       prochlorperazine 10 MG tablet   Commonly known as:  COMPAZINE   Used for:  Cancer of distal third of esophagus (H)        Dose:  10 mg   Take 1 tablet (10 mg) by mouth every 6 hours as needed (Nausea/Vomiting)   Quantity:  30 tablet   Refills:  2       timolol 0.5 % ophthalmic solution   Commonly known as:  TIMOPTIC   Used for:  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 Read the directions carefully, and ask your doctor or other care provider to review them with you.             Protect others around you: Learn how to safely use, store and throw away your medicines at www.disposemymeds.org.         Follow-ups after your visit        Your next 10 appointments already scheduled     Jan 08, 2018  8:30 AM CST   [facility] with  [facility]   [facility] ([facility])    [address]  [address] [facility] [address] [phone]            Jan 08, 2018  9:00 AM CST   (Arrive by 8:45 AM)   CT CHEST/ABDOMEN/PELVIS W CONTRAST with UCCT2   [facility] Imaging [place] [facility] ([facility] [place])    [address]  1st Floor  [facility] [id number] [phone]           Please bring any scans or X-rays taken at other hospitals, if similar tests were done. Also bring a list of your medicines, including vitamins, minerals and over-the-counter drugs. It is safest to leave personal items at home.  Be sure to tell your doctor:   If you have any allergies.   If there s any chance you are pregnant.   If you are  breastfeeding.   If you have any special needs.  You may have contrast for this exam. To prepare:   Do not eat or drink for 2 hours before your exam. If you need to take medicine, you may take it with small sips of water. (We may ask you to take liquid medicine as well.)   The day before your exam, drink extra fluids at least six 8-ounce glasses (unless your doctor tells you to restrict your fluids).  Patients over 70 or patients with diabetes or kidney problems:   If you haven t had a blood test (creatinine test) within the last 30 days, go to your clinic or Diagnostic Imaging Department for this test.  If you have diabetes:   If your kidney function is normal, continue taking your metformin (Avandamet, Glucophage, Glucovance, Metaglip) on the day of your exam.   If your kidney function is abnormal, wait 48 hours before restarting this medicine.  You will have oral contrast for this exam:   You will drink the contrast at home. Get this from your clinic or Diagnostic Imaging Department. Please follow the directions given.  Please wear loose clothing, such as a sweat suit or jogging clothes. Avoid snaps, zippers and other metal. We may ask you to undress and put on a hospital gown.  If you have any questions, please call the Imaging Department where you will have your exam.            Jan 08, 2018 12:00 PM CST   (Arrive by 11:45 AM)   Return Visit with Kadi Dee MD   [facility])    [address] [place] [id number] [phone]            Jan 08, 2018  1:00 PM CST   Infusion 240 with UC ONCOLOGY INFUSION, UC 10 ATC   [facility] ([facility])    [address] [place] [address]   [phone]               Care Instructions        Further instructions from your care team       Discharge Instructions for Liver Biopsy  You had a procedure called liver biopsy.  A doctor used a special needle to remove a small piece of tissue from your liver. Then it was examined for signs of damage or disease. A liver biopsy is ordered after other tests have shown that your liver is not working properly. You may also have a liver biopsy when liver disease is suspected, to determine whether there is too much iron in the liver, or to rule out cancer.  Home Care    Don t drive for 24 hours after the procedure.    Remove the bandage covering the biopsy site 24 hours after the procedure.    Rest for 24 hours.     Don t shower for 24 hours after the biopsy. If you wish, you may wash yourself with a sponge or washcloth. When you are able to shower, don t scrub the site. Gently wash the area and pat it dry.    Don t lift anything heavier than 10 pounds for 3-4 days after the procedure.    Ask your doctor when you can return to work.  Follow-Up  Make a follow-up appointment as directed by our staff.  When to Call Your Doctor  Call your doctor immediately if you have any of the following:    Bleeding from the biopsy site    Dizziness or lightheadedness    Sudden or increased shortness of breath    Sudden chest pain    Fever above 100.4 F    Shaking chills    Increasing redness, tenderness, or swelling at the biopsy site    Drainage from the biopsy site    Opening of the biopsy site    Increasing pain, with or without activity     Call the Interventional Radiology Department at [phone] for any problems.      Ask for the Interventional Radiologist on call, someone is available 24 hours a day.     Additional Information About Your Visit        [employer] Information     [employer] gives you secure access to your electronic health record. If you see a primary care provider, you can also send messages to your care team and make appointments. If you have questions, please call your primary care clinic.  If you do not have a primary care provider, please call [phone] and they will assist you.       "  Care EveryWhere ID     This is your Care EveryWhere ID. This could be used by other organizations to access your [facility] medical records  [id number]        Your Vitals Were     Blood Pressure Pulse Temperature Respirations Height Weight    129/64 91 97.6  F (36.4  C) (Oral) 18 1.981 m (6' 6\") 158.8 kg (350 lb)    Pulse Oximetry BMI (Body Mass Index)                98% 40.45 kg/m2           Primary Care Provider Fax #    Physician No Ref-Primary [phone]      Equal Access to Services     [facility] : Hadii aad ku hadasho Soomaali, waaxda luqadaha, qaybta kaalmada adeegyada, waxrachel junior haylann maggie [place] . So [facility] [phone].    ATENCIÓN: Si habla español, tiene a alamo disposición servicios gratuitos de asistencia lingüística. Llame al [phone].    We comply with applicable federal civil rights laws and [place] laws. We do not discriminate on the basis of race, color, national origin, age, disability, sex, sexual orientation, or gender identity.            Thank you!     Thank you for choosing [facility] for your care. Our goal is always to provide you with excellent care. Hearing back from our patients is one way we can continue to improve our services. Please take a few minutes to complete the written survey that you may receive in the mail after you visit with us. Thank you!             Medication List: This is a list of all your medications and when to take them. Check marks below indicate your daily home schedule. Keep this list as a reference.      Medications           Morning Afternoon Evening Bedtime As Needed    * dexamethasone 4 MG tablet   Commonly known as:  DECADRON   Take two tablets daily on days 2,3 and then 1 tablet daily days 4,5                                * dexamethasone 4 MG tablet   Commonly known as:  DECADRON   Take two tablets daily on days 2,3 and then 1 tablet daily days 4,5                                fish oil-omega-3 fatty acids 1000 MG capsule   Take 2 g " by mouth daily                                IBUPROFEN PO   Take 600 mg by mouth every 4 hours as needed for moderate pain                                lidocaine-prilocaine cream   Commonly known as:  EMLA   Apply topically as needed for moderate pain                                LORazepam 0.5 MG tablet   Commonly known as:  ATIVAN   Take 1 tablet (0.5 mg) by mouth every 6 hours as needed for anxiety                                LOVENOX SC   Inject 130 mg Subcutaneous 2 times daily                                Multi-vitamin Tabs tablet   Take 1 tablet by mouth daily                                ondansetron 8 MG tablet   Commonly known as:  ZOFRAN   Take 1 tablet (8 mg) by mouth every 8 hours as needed (Nausea/Vomiting)                                prochlorperazine 10 MG tablet   Commonly known as:  COMPAZINE   Take 1 tablet (10 mg) by mouth every 6 hours as needed (Nausea/Vomiting)                                timolol 0.5 % ophthalmic solution   Commonly known as:  TIMOPTIC   Place into both eyes daily                                zolpidem 10 MG tablet   Commonly known as:  AMBIEN   Take 1 tablet (10 mg) by mouth nightly as needed                                * Notice:  This list has 2 medication(s) that are the same as other medications prescribed for you. Read the directions carefully, and ask your doctor or other care provider to review them with you.

## 2017-12-27 NOTE — PROGRESS NOTES
First Name: Reuben   Age: 57 year old   Referring Physician: Dr. Dee   REASON FOR REFERRAL: Consult for liver lesion biopsy    Assessment:  Reuben is a 56 yo with stage IV esophageal cancer.  He has been on treatment with FOLFOX since that time.  He has a new liver lesion and biopsy is requested.    Plan:  Image guided biopsy of 1.7 x 1.5 cm, segment 5 liver lesion.  In addition to pathology send for PDL-1 testing and Foundation One testing  Patient is being followed with Factor Xa testing with Lovenox (no need to draw an INR level)  Patient will hold is Lovenox the night before and the morning of the procedure      HPI: This is a patient that initially noticed symptoms of dysphagia, mainly to solid food, starting in February of 2017.  Symptoms got progressively worse.  He was unable to eat solid food, and he needed to take a lot of fluid to push food down.  His dysphagia symptoms prompted subsequent evaluation with upper endoscopy and colonoscopy that was done in [place]. The colonoscopy revealed two tubular adenomas in the colon.  The upper endoscopy revealed an ulcerated mass 1-2 cm long, approximately 40 cm from the lips.  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   The patient was seen by Dr. Clemencia Carreon initially who ordered an ENT evaluation and supraclavicular lymph node FNA to rule out head and neck etiology of the cancer, since outside pathology was reported as SCC. The pathology slides were requested from [facility] for review by the [facility] Pathology Department. Our diagnosis is invasive  poorly differentiated adenocarcinoma.  The FNA of the supraclavicular LN was done that confirmed poorly differentiated adenocarcinoma of GI origin.  The tumor was P63 negative and CK7 negative.  Positive for cytokeratin 20.  The patient also had a PET/CT scan done that revealed widespread lavon metastasis and multiple liver  mets that were consistent with metastatic disease. HER2 is negative.   Reuben started FOLFOX on 5/15/17.  He is morbidly obese, has a hx of paroxysmal a-fib-took aspirin, and 20 pk yr hx of smoking, having quit in 2007.  In July he was incidentally found to have a right lower lobe PE and started on Xarelto.  He had a delay in treatment from 9/5-10/2/17 due to work related injury.  He continues with right shoulder pain.  The patient went for a second opinion in mid December and on that imaging he was found to have a larger right lung PE.  HE was switched from Xarelto to Lovenox and he takes this twice a day.  He was also found on this imaging to have a 1.7 x 1.5 cm, segment 5 liver lesion,  Biopsy is requested.  Dr. Billingsley from  reviewed the imaging, requested an ultrasound,  The lesion can be seen on ultrasound.  Dr. Billingsley said if the lesion could be seen on ultrasound, that is how we could perform the biopsy  PAST MEDICAL HISTORY:   Past Medical History:   Diagnosis Date     Cancer (H)     esophageal     Glaucoma      Paroxysmal a-fib (H)      Tubular adenoma 02/2017     PAST SURGICAL HISTORY:   Past Surgical History:   Procedure Laterality Date     AMPUTATION      toe     ARTHROSCOPY KNEE       bunion surgery       CHOLECYSTECTOMY       COLONOSCOPY  02/2017    remove 2 polyps     INSERT PORT VASCULAR ACCESS Right 5/9/2017    Procedure: INSERT PORT VASCULAR ACCESS;  Single Lumen Chest Power Port;  Surgeon: Jasiel Hu PA-C;  Location: [facility]     FAMILY HISTORY: No family history on file.  SOCIAL HISTORY:   Social History   Substance Use Topics     Smoking status: Former Smoker     Packs/day: 1.00     Years: 20.00     Types: Cigarettes     Quit date: 1/1/2007     Smokeless tobacco: Never Used     Alcohol use Yes      Comment: occasional     PROBLEM LIST:   Patient Active Problem List    Diagnosis Date Noted     Morbid obesity (H) 12/26/2017     Priority: Medium     Tear of right supraspinatus  tendon 12/26/2017     Priority: Medium     ACP (advance care planning) 10/11/2017     Priority: Medium     Acute pulmonary embolism (H) 07/10/2017     Priority: Medium     Cancer of distal third of esophagus (H) 04/25/2017     Priority: Medium     MEDICATIONS:   Prescription Medications as of 12/26/2017             Enoxaparin Sodium (LOVENOX SC) Inject 130 mg Subcutaneous 2 times daily    dexamethasone (DECADRON) 4 MG tablet Take two tablets daily on days 2,3 and then 1 tablet daily days 4,5    dexamethasone (DECADRON) 4 MG tablet Take two tablets daily on days 2,3 and then 1 tablet daily days 4,5    zolpidem (AMBIEN) 10 MG tablet Take 1 tablet (10 mg) by mouth nightly as needed    ondansetron (ZOFRAN) 8 MG tablet Take 1 tablet (8 mg) by mouth every 8 hours as needed (Nausea/Vomiting)    lidocaine-prilocaine (EMLA) cream Apply topically as needed for moderate pain    prochlorperazine (COMPAZINE) 10 MG tablet Take 1 tablet (10 mg) by mouth every 6 hours as needed (Nausea/Vomiting)    LORazepam (ATIVAN) 0.5 MG tablet Take 1 tablet (0.5 mg) by mouth every 6 hours as needed for anxiety    multivitamin, therapeutic with minerals (MULTI-VITAMIN) TABS tablet Take 1 tablet by mouth daily    fish oil-omega-3 fatty acids 1000 MG capsule Take 2 g by mouth daily    timolol (TIMOPTIC) 0.5 % ophthalmic solution Place into both eyes daily        ALLERGIES: Penicillin g and Penicillins  VITALS: /78 (BP Location: Right arm, Patient Position: Sitting, Cuff Size: Adult Large)  Pulse 94  SpO2 97%    ROS:  Answers for HPI/ROS submitted by the patient on 12/22/2017   General Symptoms: Yes  Skin Symptoms: No  HENT Symptoms: No  EYE SYMPTOMS: No  HEART SYMPTOMS: Yes  LUNG SYMPTOMS: No  INTESTINAL SYMPTOMS: Yes  URINARY SYMPTOMS: No  REPRODUCTIVE SYMPTOMS: No  SKELETAL SYMPTOMS: Yes  BLOOD SYMPTOMS: Yes  NERVOUS SYSTEM SYMPTOMS: Yes  MENTAL HEALTH SYMPTOMS: Yes  Fever: No  Loss of appetite: No  Weight loss: No  Weight gain:  No  Fatigue: Yes  Night sweats: No  Chills: Yes  Increased stress: No  Excessive hunger: No  Excessive thirst: No  Feeling hot or cold when others believe the temperature is normal: Yes  Loss of height: No  Post-operative complications: No  Surgical site pain: No  Hallucinations: No  Change in or Loss of Energy: Yes  Hyperactivity: No  Confusion: No  Chest pain or pressure: No  Fast or irregular heartbeat: Yes  Pain in legs with walking: No  Trouble breathing while lying down: No  Fingers or toes appear blue: No  High blood pressure: No  Low blood pressure: No  Fainting: No  Murmurs: No  Pacemaker: No  Varicose veins: Yes  Wake up at night with shortness of breath: No  Light-headedness: No  Exercise intolerance: Yes  Heart burn or indigestion: No  Nausea: No  Vomiting: No  Abdominal pain: No  Bloating: No  Constipation: Yes  Diarrhea: Yes  Blood in stool: No  Black stools: No  Rectal or Anal pain: No  Fecal incontinence: No  Yellowing of skin or eyes: No  Vomit with blood: No  Change in stools: No  Back pain: No  Muscle aches: Yes  Neck pain: No  Swollen joints: No  Joint pain: No  Bone pain: No  Muscle cramps: No  Muscle weakness: Yes  Joint stiffness: No  Bone fracture: No  Anemia: No  Swollen glands: No  Easy bleeding or bruising: Yes  Trouble with coordination: Yes  Dizziness or trouble with balance: Yes  Memory loss: Yes  Headache: No  Seizures: No  Speech problems: No  Tingling: Yes  Tremor: No  Weakness: Yes  Difficulty walking: No  Paralysis: No  Numbness: Yes  Nervous or Anxious: No  Depression: No  Trouble sleeping: Yes  Trouble thinking or concentrating: Yes  Mood changes: No  Panic attacks: No    Physical Examination: Vital signs are reviewed and they are stable  Constitutional:Middle aged gentleman, in no acute physical distress, came with his sister to the appt  Cardiovascular: RRR  Respiratory: lungs clear  Musculoskeletal: extremities normal- no gross deformities noted, gait normal, normal muscle  tone  Skin:  no suspicious lesions or rashes  Neurologic: neuropathy in his finger tips  Psychiatric: mentation appears normal., affect normal/bright    BMP RESULTS:  Lab Results   Component Value Date     12/11/2017    POTASSIUM 4.0 12/11/2017    CHLORIDE 107 12/11/2017    CO2 24 12/11/2017    ANIONGAP 9 12/11/2017     (H) 12/11/2017    BUN 13 12/11/2017    CR 0.85 12/11/2017    GFRESTIMATED >90 12/11/2017    GFRESTBLACK >90 12/11/2017    NIDHI 9.2 12/11/2017        CBC RESULTS:  Lab Results   Component Value Date    WBC 5.5 12/11/2017    RBC 4.64 12/11/2017    HGB 14.4 12/11/2017    HCT 42.8 12/11/2017    MCV 92 12/11/2017    MCH 31.0 12/11/2017    MCHC 33.6 12/11/2017    RDW 14.4 12/11/2017     (L) 12/11/2017       INR/PTT:  Lab Results   Component Value Date    INR 1.16 (H) 05/09/2017    PTT 32 05/09/2017       Diagnostic studies: see outside CT    PROVIDER NOTE:  I explained the procedure to Reuben and his sister.  This included:  Preparing for the procedure, the actual procedure and recovery.  I explained the risks of the liver biopsy:  Bleeding, infection, potential for puncturing the lung, and death related to the procedure.  I explained that usually the results return after three to four business days.    I explained that he/she would be contacted by someone from Dr. Dee's office following this to determine a future plan.  Thank you for involving us in the care of your patient.    30 minutes was spent with Reuben and his sister.  20 minutes was spent in counseling.  Danielle Jackson MS, APRN, CNS, CRN  Clinical Nurse Specialist  Interventional Radiology  [phone] (voice mail)  [phone] (pager)    CC  Patient Care Team:  No Ref-Primary, Physician as PCP - General  Josefina Wetzel, RN as Nurse Coordinator (Oncology)  Kadi Dee MD as MD (Hematology)  Katalina Ramirez PA-C as Physician Assistant (Physician Assistant)  Romero Guerrero MD as MD (Clinical Cardiac  Electrophysiology)  Arlette Youngblood, RN as Nurse Coordinator (Cardiology)  MALAIKA WESTFALL

## 2017-12-27 NOTE — PROGRESS NOTES
Interventional Radiology Pre-Procedure Sedation Assessment   Time of Assessment: 10:45 AM    Expected Level: Moderate Sedation, if procedure becomes too uncomfortable with local    Indication: Sedation is required for the following type of Procedure: Venous Access    Sedation and procedural consent: Risks, benefits and alternatives were discussed with Patient    PO Intake: Appropriately NPO for procedure    ASA Class: Class 2 - MILD SYSTEMIC DISEASE, NO ACUTE PROBLEMS, NO FUNCTIONAL LIMITATIONS.    Mallampati: Grade 2:  Soft palate, base of uvula, tonsillar pillars, and portion of posterior pharyngeal wall visible    Lungs: Lungs Clear with good breath sounds bilaterally    Heart: Normal heart sounds and rate    History and physical reviewed and no updates needed. I have reviewed the lab findings, diagnostic data, medications, and the plan for sedation. I have determined this patient to be an appropriate candidate for the planned sedation and procedure and have reassessed the patient IMMEDIATELY PRIOR to sedation and procedure.    Messi Talley PA-C

## 2017-12-27 NOTE — PROGRESS NOTES
Pt returned from IR with RN, post liver biopsy. Pt is awake and alert, denies pain. Site is right lateral abdomen, dressing is dry and intact. Pt taking PO. VSS.

## 2017-12-27 NOTE — DISCHARGE INSTRUCTIONS
Discharge Instructions for Liver Biopsy  You had a procedure called liver biopsy. A doctor used a special needle to remove a small piece of tissue from your liver. Then it was examined for signs of damage or disease. A liver biopsy is ordered after other tests have shown that your liver is not working properly. You may also have a liver biopsy when liver disease is suspected, to determine whether there is too much iron in the liver, or to rule out cancer.  Home Care    Don t drive for 24 hours after the procedure.    Remove the bandage covering the biopsy site 24 hours after the procedure.    Rest for 24 hours.     Don t shower for 24 hours after the biopsy. If you wish, you may wash yourself with a sponge or washcloth. When you are able to shower, don t scrub the site. Gently wash the area and pat it dry.    Don t lift anything heavier than 10 pounds for 3-4 days after the procedure.    Ask your doctor when you can return to work.  Follow-Up  Make a follow-up appointment as directed by our staff.  When to Call Your Doctor  Call your doctor immediately if you have any of the following:    Bleeding from the biopsy site    Dizziness or lightheadedness    Sudden or increased shortness of breath    Sudden chest pain    Fever above 100.4 F    Shaking chills    Increasing redness, tenderness, or swelling at the biopsy site    Drainage from the biopsy site    Opening of the biopsy site    Increasing pain, with or without activity     Call the Interventional Radiology Department at [phone] for any problems.      Ask for the Interventional Radiologist on call, someone is available 24 hours a day.

## 2017-12-27 NOTE — PROGRESS NOTES
Pt up walking, steady on his feet; Site remains flat and dry. Pt denies pain; pt tolerated PO; declined to void, but stated he was comfortable. Port de accessed with heparin, site remains flat and dry. Discharge instructions reviewed and copy to pt; pt stated understanding and denies questions. Pt did not receive sedation for procedure; [facility] by self.

## 2017-12-27 NOTE — PROGRESS NOTES
Patient Name: Reuben Padilla  Medical Record Number: [id number]  Today's Date: 12/27/2017    Procedure: Liver Lesion Biopsy  Proceduralist: Dr. Randhawa, Dr. Guadarrama    Sedation start time: no sedation    Procedure start time: 1150  Puncture time: 1152  Procedure end time: 1215    Report given to: Rin ROBERTS RN    Other Notes: Pt arrived to IR room 6 from . Pt denies any questions or concerns regarding procedure. Pt positioned on right side and monitored per protocol.  Pathology present and responsible for specimen, 5 cores obtained. Pt tolerated procedure without any noted complications.  Dressing on right upper abdomen CDI. Pt transferred back to .    Suha Batres RN

## 2017-12-29 DIAGNOSIS — C15.5 CANCER OF DISTAL THIRD OF ESOPHAGUS (H): Primary | ICD-10-CM

## 2017-12-29 LAB — COPATH REPORT: NORMAL

## 2018-01-01 ASSESSMENT — ENCOUNTER SYMPTOMS
MUSCLE CRAMPS: 0
WEIGHT GAIN: 0
VOMITING: 0
DECREASED CONCENTRATION: 0
DIARRHEA: 1
BRUISES/BLEEDS EASILY: 1
SWOLLEN GLANDS: 0
NERVOUS/ANXIOUS: 0
DISTURBANCES IN COORDINATION: 1
HEADACHES: 0
EXERCISE INTOLERANCE: 1
HYPOTENSION: 0
SYNCOPE: 0
SLEEP DISTURBANCES DUE TO BREATHING: 0
PALPITATIONS: 1
MYALGIAS: 1
HYPERTENSION: 0
POLYDIPSIA: 0
DECREASED APPETITE: 0
BOWEL INCONTINENCE: 0
LEG PAIN: 0
ARTHRALGIAS: 0
HALLUCINATIONS: 0
RECTAL PAIN: 0
BACK PAIN: 1
CHILLS: 0
HEARTBURN: 0
JAUNDICE: 0
STIFFNESS: 0
NUMBNESS: 1
JOINT SWELLING: 0
LIGHT-HEADEDNESS: 0
ABDOMINAL PAIN: 0
INCREASED ENERGY: 1
PANIC: 0
WEIGHT LOSS: 0
ORTHOPNEA: 0
BLOOD IN STOOL: 0
NIGHT SWEATS: 0
POLYPHAGIA: 0
DIZZINESS: 1
BLOATING: 0
DEPRESSION: 0
WEAKNESS: 1
CONSTIPATION: 1
SPEECH CHANGE: 0
INSOMNIA: 1
FATIGUE: 1
PARALYSIS: 0
NECK PAIN: 0
FEVER: 0
ALTERED TEMPERATURE REGULATION: 1
TINGLING: 0
TREMORS: 0
NAUSEA: 0
MEMORY LOSS: 1
LOSS OF CONSCIOUSNESS: 0
SEIZURES: 0

## 2018-01-08 ENCOUNTER — INFUSION THERAPY VISIT (OUTPATIENT)
Dept: ONCOLOGY | Facility: CLINIC | Age: 58
End: 2018-01-08
Attending: INTERNAL MEDICINE
Payer: COMMERCIAL

## 2018-01-08 ENCOUNTER — APPOINTMENT (OUTPATIENT)
Dept: LAB | Facility: CLINIC | Age: 58
End: 2018-01-08
Attending: INTERNAL MEDICINE
Payer: COMMERCIAL

## 2018-01-08 VITALS
WEIGHT: 315 LBS | DIASTOLIC BLOOD PRESSURE: 82 MMHG | SYSTOLIC BLOOD PRESSURE: 117 MMHG | BODY MASS INDEX: 36.45 KG/M2 | HEART RATE: 99 BPM | TEMPERATURE: 98.4 F | OXYGEN SATURATION: 97 % | HEIGHT: 78 IN | RESPIRATION RATE: 16 BRPM

## 2018-01-08 DIAGNOSIS — C15.5 CANCER OF DISTAL THIRD OF ESOPHAGUS (H): Primary | ICD-10-CM

## 2018-01-08 DIAGNOSIS — C15.5 CANCER OF DISTAL THIRD OF ESOPHAGUS (H): ICD-10-CM

## 2018-01-08 LAB
ALBUMIN SERPL-MCNC: 3.4 G/DL (ref 3.4–5)
ALP SERPL-CCNC: 92 U/L (ref 40–150)
ALT SERPL W P-5'-P-CCNC: 32 U/L (ref 0–70)
ANION GAP SERPL CALCULATED.3IONS-SCNC: 6 MMOL/L (ref 3–14)
AST SERPL W P-5'-P-CCNC: 29 U/L (ref 0–45)
BASOPHILS # BLD AUTO: 0 10E9/L (ref 0–0.2)
BASOPHILS NFR BLD AUTO: 0.5 %
BILIRUB SERPL-MCNC: 0.5 MG/DL (ref 0.2–1.3)
BUN SERPL-MCNC: 10 MG/DL (ref 7–30)
CALCIUM SERPL-MCNC: 9.1 MG/DL (ref 8.5–10.1)
CHLORIDE SERPL-SCNC: 109 MMOL/L (ref 94–109)
CO2 SERPL-SCNC: 26 MMOL/L (ref 20–32)
CREAT SERPL-MCNC: 0.83 MG/DL (ref 0.66–1.25)
DIFFERENTIAL METHOD BLD: ABNORMAL
EOSINOPHIL # BLD AUTO: 0 10E9/L (ref 0–0.7)
EOSINOPHIL NFR BLD AUTO: 0.5 %
ERYTHROCYTE [DISTWIDTH] IN BLOOD BY AUTOMATED COUNT: 13.2 % (ref 10–15)
GFR SERPL CREATININE-BSD FRML MDRD: >90 ML/MIN/1.7M2
GLUCOSE SERPL-MCNC: 98 MG/DL (ref 70–99)
HCT VFR BLD AUTO: 42.3 % (ref 40–53)
HGB BLD-MCNC: 13.9 G/DL (ref 13.3–17.7)
IMM GRANULOCYTES # BLD: 0 10E9/L (ref 0–0.4)
IMM GRANULOCYTES NFR BLD: 0.8 %
LYMPHOCYTES # BLD AUTO: 1.1 10E9/L (ref 0.8–5.3)
LYMPHOCYTES NFR BLD AUTO: 31 %
MCH RBC QN AUTO: 30.8 PG (ref 26.5–33)
MCHC RBC AUTO-ENTMCNC: 32.9 G/DL (ref 31.5–36.5)
MCV RBC AUTO: 94 FL (ref 78–100)
MONOCYTES # BLD AUTO: 0.4 10E9/L (ref 0–1.3)
MONOCYTES NFR BLD AUTO: 9.6 %
NEUTROPHILS # BLD AUTO: 2.1 10E9/L (ref 1.6–8.3)
NEUTROPHILS NFR BLD AUTO: 57.6 %
NRBC # BLD AUTO: 0 10*3/UL
NRBC BLD AUTO-RTO: 0 /100
PLATELET # BLD AUTO: 134 10E9/L (ref 150–450)
POTASSIUM SERPL-SCNC: 4 MMOL/L (ref 3.4–5.3)
PROT SERPL-MCNC: 7.5 G/DL (ref 6.8–8.8)
RBC # BLD AUTO: 4.51 10E12/L (ref 4.4–5.9)
SODIUM SERPL-SCNC: 141 MMOL/L (ref 133–144)
WBC # BLD AUTO: 3.7 10E9/L (ref 4–11)

## 2018-01-08 PROCEDURE — 96375 TX/PRO/DX INJ NEW DRUG ADDON: CPT

## 2018-01-08 PROCEDURE — 25000125 ZZHC RX 250: Mod: ZF | Performed by: INTERNAL MEDICINE

## 2018-01-08 PROCEDURE — 85025 COMPLETE CBC W/AUTO DIFF WBC: CPT | Performed by: INTERNAL MEDICINE

## 2018-01-08 PROCEDURE — 80053 COMPREHEN METABOLIC PANEL: CPT | Performed by: INTERNAL MEDICINE

## 2018-01-08 PROCEDURE — 25000128 H RX IP 250 OP 636: Mod: ZF | Performed by: INTERNAL MEDICINE

## 2018-01-08 PROCEDURE — 99215 OFFICE O/P EST HI 40 MIN: CPT | Mod: ZP | Performed by: INTERNAL MEDICINE

## 2018-01-08 PROCEDURE — G0463 HOSPITAL OUTPT CLINIC VISIT: HCPCS | Mod: ZF

## 2018-01-08 PROCEDURE — 96413 CHEMO IV INFUSION 1 HR: CPT

## 2018-01-08 RX ORDER — EPINEPHRINE 0.3 MG/.3ML
0.3 INJECTION SUBCUTANEOUS EVERY 5 MIN PRN
Status: CANCELLED | OUTPATIENT
Start: 2018-01-08

## 2018-01-08 RX ORDER — HEPARIN SODIUM (PORCINE) LOCK FLUSH IV SOLN 100 UNIT/ML 100 UNIT/ML
5 SOLUTION INTRAVENOUS ONCE
Status: COMPLETED | OUTPATIENT
Start: 2018-01-08 | End: 2018-01-08

## 2018-01-08 RX ORDER — LORAZEPAM 2 MG/ML
0.5 INJECTION INTRAMUSCULAR EVERY 4 HOURS PRN
Status: CANCELLED
Start: 2018-01-22

## 2018-01-08 RX ORDER — MEPERIDINE HYDROCHLORIDE 25 MG/ML
25 INJECTION INTRAMUSCULAR; INTRAVENOUS; SUBCUTANEOUS EVERY 30 MIN PRN
Status: CANCELLED | OUTPATIENT
Start: 2018-01-15

## 2018-01-08 RX ORDER — METHYLPREDNISOLONE SODIUM SUCCINATE 125 MG/2ML
125 INJECTION, POWDER, LYOPHILIZED, FOR SOLUTION INTRAMUSCULAR; INTRAVENOUS
Status: CANCELLED
Start: 2018-01-15

## 2018-01-08 RX ORDER — ALBUTEROL SULFATE 90 UG/1
1-2 AEROSOL, METERED RESPIRATORY (INHALATION)
Status: CANCELLED
Start: 2018-01-22

## 2018-01-08 RX ORDER — METHYLPREDNISOLONE SODIUM SUCCINATE 125 MG/2ML
125 INJECTION, POWDER, LYOPHILIZED, FOR SOLUTION INTRAMUSCULAR; INTRAVENOUS
Status: CANCELLED
Start: 2018-01-22

## 2018-01-08 RX ORDER — ALBUTEROL SULFATE 0.83 MG/ML
2.5 SOLUTION RESPIRATORY (INHALATION)
Status: CANCELLED | OUTPATIENT
Start: 2018-01-15

## 2018-01-08 RX ORDER — ALBUTEROL SULFATE 90 UG/1
1-2 AEROSOL, METERED RESPIRATORY (INHALATION)
Status: CANCELLED
Start: 2018-01-08

## 2018-01-08 RX ORDER — EPINEPHRINE 1 MG/ML
0.3 INJECTION, SOLUTION, CONCENTRATE INTRAVENOUS EVERY 5 MIN PRN
Status: CANCELLED | OUTPATIENT
Start: 2018-01-15

## 2018-01-08 RX ORDER — LORAZEPAM 2 MG/ML
0.5 INJECTION INTRAMUSCULAR EVERY 4 HOURS PRN
Status: CANCELLED
Start: 2018-01-15

## 2018-01-08 RX ORDER — DIPHENHYDRAMINE HCL 25 MG
50 CAPSULE ORAL ONCE
Status: CANCELLED
Start: 2018-01-08

## 2018-01-08 RX ORDER — EPINEPHRINE 1 MG/ML
0.3 INJECTION, SOLUTION, CONCENTRATE INTRAVENOUS EVERY 5 MIN PRN
Status: CANCELLED | OUTPATIENT
Start: 2018-01-22

## 2018-01-08 RX ORDER — MEPERIDINE HYDROCHLORIDE 25 MG/ML
25 INJECTION INTRAMUSCULAR; INTRAVENOUS; SUBCUTANEOUS EVERY 30 MIN PRN
Status: CANCELLED | OUTPATIENT
Start: 2018-01-22

## 2018-01-08 RX ORDER — HEPARIN SODIUM (PORCINE) LOCK FLUSH IV SOLN 100 UNIT/ML 100 UNIT/ML
500 SOLUTION INTRAVENOUS ONCE
Status: COMPLETED | OUTPATIENT
Start: 2018-01-08 | End: 2018-01-08

## 2018-01-08 RX ORDER — DIPHENHYDRAMINE HYDROCHLORIDE 50 MG/ML
50 INJECTION INTRAMUSCULAR; INTRAVENOUS
Status: CANCELLED
Start: 2018-01-08

## 2018-01-08 RX ORDER — EPINEPHRINE 0.3 MG/.3ML
0.3 INJECTION SUBCUTANEOUS EVERY 5 MIN PRN
Status: CANCELLED | OUTPATIENT
Start: 2018-01-15

## 2018-01-08 RX ORDER — EPINEPHRINE 0.3 MG/.3ML
0.3 INJECTION SUBCUTANEOUS EVERY 5 MIN PRN
Status: CANCELLED | OUTPATIENT
Start: 2018-01-22

## 2018-01-08 RX ORDER — ALBUTEROL SULFATE 0.83 MG/ML
2.5 SOLUTION RESPIRATORY (INHALATION)
Status: CANCELLED | OUTPATIENT
Start: 2018-01-08

## 2018-01-08 RX ORDER — LORAZEPAM 0.5 MG/1
0.5 TABLET ORAL EVERY 4 HOURS PRN
Qty: 30 TABLET | Refills: 2 | Status: SHIPPED | OUTPATIENT
Start: 2018-01-08 | End: 2018-11-13

## 2018-01-08 RX ORDER — EPINEPHRINE 1 MG/ML
0.3 INJECTION, SOLUTION, CONCENTRATE INTRAVENOUS EVERY 5 MIN PRN
Status: CANCELLED | OUTPATIENT
Start: 2018-01-08

## 2018-01-08 RX ORDER — LORAZEPAM 2 MG/ML
0.5 INJECTION INTRAMUSCULAR EVERY 4 HOURS PRN
Status: CANCELLED
Start: 2018-01-08

## 2018-01-08 RX ORDER — ALBUTEROL SULFATE 0.83 MG/ML
2.5 SOLUTION RESPIRATORY (INHALATION)
Status: CANCELLED | OUTPATIENT
Start: 2018-01-22

## 2018-01-08 RX ORDER — PROCHLORPERAZINE MALEATE 10 MG
10 TABLET ORAL EVERY 6 HOURS PRN
Qty: 30 TABLET | Refills: 2 | Status: SHIPPED | OUTPATIENT
Start: 2018-01-08 | End: 2018-08-28

## 2018-01-08 RX ORDER — DIPHENHYDRAMINE HCL 25 MG
50 CAPSULE ORAL ONCE
Status: CANCELLED
Start: 2018-01-22

## 2018-01-08 RX ORDER — SODIUM CHLORIDE 9 MG/ML
1000 INJECTION, SOLUTION INTRAVENOUS CONTINUOUS PRN
Status: CANCELLED
Start: 2018-01-15

## 2018-01-08 RX ORDER — DIPHENHYDRAMINE HYDROCHLORIDE 50 MG/ML
50 INJECTION INTRAMUSCULAR; INTRAVENOUS
Status: CANCELLED
Start: 2018-01-22

## 2018-01-08 RX ORDER — MEPERIDINE HYDROCHLORIDE 25 MG/ML
25 INJECTION INTRAMUSCULAR; INTRAVENOUS; SUBCUTANEOUS EVERY 30 MIN PRN
Status: CANCELLED | OUTPATIENT
Start: 2018-01-08

## 2018-01-08 RX ORDER — DIPHENHYDRAMINE HCL 25 MG
50 CAPSULE ORAL ONCE
Status: CANCELLED
Start: 2018-01-15

## 2018-01-08 RX ORDER — SODIUM CHLORIDE 9 MG/ML
1000 INJECTION, SOLUTION INTRAVENOUS CONTINUOUS PRN
Status: CANCELLED
Start: 2018-01-22

## 2018-01-08 RX ORDER — ALBUTEROL SULFATE 90 UG/1
1-2 AEROSOL, METERED RESPIRATORY (INHALATION)
Status: CANCELLED
Start: 2018-01-15

## 2018-01-08 RX ORDER — DIPHENHYDRAMINE HYDROCHLORIDE 50 MG/ML
50 INJECTION INTRAMUSCULAR; INTRAVENOUS
Status: CANCELLED
Start: 2018-01-15

## 2018-01-08 RX ORDER — SODIUM CHLORIDE 9 MG/ML
1000 INJECTION, SOLUTION INTRAVENOUS CONTINUOUS PRN
Status: CANCELLED
Start: 2018-01-08

## 2018-01-08 RX ORDER — DIPHENHYDRAMINE HYDROCHLORIDE 50 MG/ML
50 INJECTION INTRAMUSCULAR; INTRAVENOUS ONCE
Status: COMPLETED | OUTPATIENT
Start: 2018-01-08 | End: 2018-01-08

## 2018-01-08 RX ORDER — EPINEPHRINE 0.3 MG/.3ML
INJECTION SUBCUTANEOUS
Status: DISCONTINUED
Start: 2018-01-08 | End: 2018-01-08 | Stop reason: WASHOUT

## 2018-01-08 RX ORDER — METHYLPREDNISOLONE SODIUM SUCCINATE 125 MG/2ML
125 INJECTION, POWDER, LYOPHILIZED, FOR SOLUTION INTRAMUSCULAR; INTRAVENOUS
Status: CANCELLED
Start: 2018-01-08

## 2018-01-08 RX ADMIN — SODIUM CHLORIDE, PRESERVATIVE FREE 500 UNITS: 5 INJECTION INTRAVENOUS at 15:54

## 2018-01-08 RX ADMIN — DEXAMETHASONE SODIUM PHOSPHATE 20 MG: 10 INJECTION, SOLUTION INTRAMUSCULAR; INTRAVENOUS at 14:13

## 2018-01-08 RX ADMIN — SODIUM CHLORIDE 250 ML: 9 INJECTION, SOLUTION INTRAVENOUS at 13:55

## 2018-01-08 RX ADMIN — FAMOTIDINE 20 MG: 20 INJECTION, SOLUTION INTRAVENOUS at 13:58

## 2018-01-08 RX ADMIN — PACLITAXEL 240 MG: 6 INJECTION, SOLUTION INTRAVENOUS at 14:35

## 2018-01-08 RX ADMIN — DIPHENHYDRAMINE HYDROCHLORIDE 50 MG: 50 INJECTION INTRAMUSCULAR; INTRAVENOUS at 13:55

## 2018-01-08 RX ADMIN — SODIUM CHLORIDE, PRESERVATIVE FREE 5 ML: 5 INJECTION INTRAVENOUS at 11:49

## 2018-01-08 ASSESSMENT — PAIN SCALES - GENERAL: PAINLEVEL: NO PAIN (0)

## 2018-01-08 NOTE — NURSING NOTE
Chief Complaint   Patient presents with     Port Draw     labs drawn via port by RN     /82 (BP Location: Left arm, Patient Position: Chair, Cuff Size: Adult Large)  Pulse 99  Temp 98.4  F (36.9  C) (Oral)  Wt (!) 163.3 kg (360 lb)  SpO2 97%  BMI 41.6 kg/m2    Vitals taken. Port accessed by RN. Labs collected and sent. JIC INR Tube drawn and sent.   Line flushed with NS & Heparin. Pt tolerated well. Pt checked in for next appointment.    Daniela Fisher, RN

## 2018-01-08 NOTE — NURSING NOTE
"Oncology Rooming Note    January 8, 2018 12:11 PM   Reuben Padilla is a 52 year old male who presents for: Port Draw and Oncology Clinic Visit    Initial Vitals: /82 (BP Location: Left arm, Patient Position: Chair, Cuff Size: Adult Large)  Pulse 99  Temp 98.4  F (36.9  C) (Oral)  Resp 16  Ht 1.981 m (6' 5.99\")  Wt (!) 163.3 kg (360 lb)  SpO2 97%  BMI 41.61 kg/m2 Estimated body mass index is 41.61 kg/(m^2) as calculated from the following:    Height as of this encounter: 1.981 m (6' 5.99\").    Weight as of this encounter: 163.3 kg (360 lb). Body surface area is 3 meters squared.  No Pain (0) Comment: Data Unavailable   No LMP for male patient.  Allergies reviewed: Yes  Medications reviewed: Yes    Medications: MEDICATION REFILLS NEEDED TODAY. Provider was NOT notified.  Pharmacy name entered into [employer]:    [id number] - [place], [place] - [address] [place] [address] AT INTERSECTION 109[facility] [place], [place] - [address] [place] [address] [phone]  [id number] - [place], [place] - [address] [place] [address]    Clinical concerns:refills on Ambien.  I informed pt to remind the Provider/BERENICE about refills in case i dont touch bases with them, if the provider was in the exam room while i attend on rooming the next pt. Pt verbalized understandings.  Gianna Nolan CMA  Pt received flu shot elsewhere. See Immunizations     6 minutes for nursing intake (face to face time)     Gianna Nolan CMA                              "

## 2018-01-08 NOTE — PATIENT INSTRUCTIONS
Contact Numbers  [facility] Nurse Triage: [phone]  After Hours Nurse Line:  [phone]    Please call the [facility] Nurse Triage line or after hours number if you experience a temperature greater than or equal to 100.5, shaking chills, have uncontrolled nausea, vomiting and/or diarrhea, dizziness, shortness of breath, chest pain, bleeding, unexplained bruising, or if you have any other new/concerning symptoms, questions or concerns.     If you are having any concerning symptoms or wish to speak to a provider before your next infusion visit, please call your care coordinator or triage to notify them so we can adequately serve you.     If you need a refill on a narcotic prescription or other medication, please call triage before your infusion appointment.           January 2018 Sunday Monday Tuesday Wednesday Thursday Friday Saturday        1     2     3     4     5     6       7     8     [facility] DRAW   11:30 AM   (15 min.)    [facility] DRAW   [facility] Draw     [facility] RETURN   11:45 AM   (30 min.)   K[facility] Dee [facility]    1:00 PM   (240 min.)    ONCOLOGY INFUSION   [facility] 9     10     11     12     13       14     15     [facility] DRAW    8:45 AM   (15 min.)    [facility] DRAW   [facility] Lab Draw     P ONC INFUSION 120    9:30 AM   (120 min.)    ONCOLOGY INFUSION   [facility] 16     17     18     19     20       21     22     [facility] DRAW    8:45 AM   (15 min.)    [facility] DRAW   [facility] Lab Draw     [facility] RETURN    9:05 AM   (50 min.)   Loraine Sutherland PA-C   [facility]   10:30 AM   (180 min.)    ONCOLOGY INFUSION   [facility] 23     24     25     26     27       28     29     30     31 February 2018 Sunday Monday Tuesday Wednesday  Thursday Friday Saturday                       1     2     3       4     5     [facility] DRAW    9:30 AM   (15 min.)    [facility] DRAW   [facility] Draw     [facility] RETURN    9:55 AM   (50 min.)   Loraine Sutherland PA-C   [facility]     [facility] ONC INFUSION 180   11:00 AM   (180 min.)   [facility]COL[facility]ION   [facility] 6     7     8     9     10       11     12     [facility] DRAW    8:00 AM   (15 min.)    [facility]     [facility]C INFUSION 120    8:30 AM   (120 min.)   [facility] ONCOL[facility] INFUSION   [facility] 13     14     15     16     17       18     19     [facility] DRAW    7:30 AM   (15 min.)    [facility]     P ONC INFUSION 180    8:00 AM   (180 min.)    ONCOLOGY INFUSION   [facility] 20     21     22     23     24       25     26     27     28                                Recent Results (from the past 24 hour(s))   CBC with platelets differential    Collection Time: 01/08/18 12:04 PM   Result Value Ref Range    WBC 3.7 (L) 4.0 - 11.0 10e9/L    RBC Count 4.51 4.4 - 5.9 10e12/L    Hemoglobin 13.9 13.3 - 17.7 g/dL    Hematocrit 42.3 40.0 - 53.0 %    MCV 94 78 - 100 fl    MCH 30.8 26.5 - 33.0 pg    MCHC 32.9 31.5 - 36.5 g/dL    RDW 13.2 10.0 - 15.0 %    Platelet Count 134 (L) 150 - 450 10e9/L    Diff Method Automated Method     % Neutrophils 57.6 %    % Lymphocytes 31.0 %    % Monocytes 9.6 %    % Eosinophils 0.5 %    % Basophils 0.5 %    % Immature Granulocytes 0.8 %    Nucleated RBCs 0 0 /100    Absolute Neutrophil 2.1 1.6 - 8.3 10e9/L    Absolute Lymphocytes 1.1 0.8 - 5.3 10e9/L    Absolute Monocytes 0.4 0.0 - 1.3 10e9/L    Absolute Eosinophils 0.0 0.0 - 0.7 10e9/L    Absolute Basophils 0.0 0.0 - 0.2 10e9/L    Abs Immature Granulocytes 0.0 0 - 0.4 10e9/L    Absolute Nucleated RBC 0.0    Comprehensive metabolic panel    Collection Time:  01/08/18 12:04 PM   Result Value Ref Range    Sodium 141 133 - 144 mmol/L    Potassium 4.0 3.4 - 5.3 mmol/L    Chloride 109 94 - 109 mmol/L    Carbon Dioxide 26 20 - 32 mmol/L    Anion Gap 6 3 - 14 mmol/L    Glucose 98 70 - 99 mg/dL    Urea Nitrogen 10 7 - 30 mg/dL    Creatinine 0.83 0.66 - 1.25 mg/dL    GFR Estimate >90 >60 mL/min/1.7m2    GFR Estimate If Black >90 >60 mL/min/1.7m2    Calcium 9.1 8.5 - 10.1 mg/dL    Bilirubin Total 0.5 0.2 - 1.3 mg/dL    Albumin 3.4 3.4 - 5.0 g/dL    Protein Total 7.5 6.8 - 8.8 g/dL    Alkaline Phosphatase 92 40 - 150 U/L    ALT 32 0 - 70 U/L    AST 29 0 - 45 U/L

## 2018-01-08 NOTE — MR AVS SNAPSHOT
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)           Follow-ups after your visit        Your next 10 appointments already scheduled     Dieudonne 15, 2018  8:45 AM CST   [facility] Lab Draw with [facility] MASONIC LAB DRAW   [facility]    [address] [place] [address]  [address]  [facility] [address]   [phone]            Dieudonne 15, 2018  9:30 AM CST   Infusion 120 with UC ONCOLOGY INFUSION, UC 30 ATC   [facility] ([facility])    [address] [place] [address]  [address]  [facility] [address]   [phone]            Jan 22, 2018  8:45 AM CST   [facility] Lab Draw with [facility] MASONIC LAB DRAW   [facility]    [address] [place] [address]  [address]  [facility] [address]   [phone]            Jan 22, 2018  9:20 AM CST   (Arrive by 9:05 AM)   Return Visit with SWAPNIL Young [facility] ([facility])    [address] [place] [address]  [address]  [facility] [address]   [phone]            Jan 22, 2018 10:30 AM CST   Infusion 180 with UC ONCOLOGY INFUSION, UC 16 ATC   [facility] ([facility])    [address] [place] [address]  [address]  [facility] [address]   [phone]            Feb 05, 2018  9:30 AM CST   [facility] Lab Draw with UC MASONIC LAB DRAW   [facility] Lab Draw ([facility])    [address] [place] [address]  [address]  [facility] [address]   [phone]            Feb 05, 2018 10:10 AM CST   (Arrive by 9:55 AM)   Return Visit with SWAPNIL Young [facility]  "Cancer Clinic ([facility])    [address]  [address]  [facility] [address] [phone]            Feb 05, 2018 11:00 AM CST   Infusion 180 with UC ONCOLOGY INFUSION   [facility] ([facility])    [address]  [address]  [facility] [id number] [phone]              Who to contact     If you have questions or need follow up information about today's clinic visit or your schedule please contact [facility] directly at [phone].  Normal or non-critical lab and imaging results will be communicated to you by [email or web address], letter or phone within 4 business days after the clinic has received the results. If you do not hear from us within 7 days, please contact the clinic through [employer] or phone. If you have a critical or abnormal lab result, we will notify you by phone as soon as possible.  Submit refill requests through [employer] or call your pharmacy and they will forward the refill request to us. Please allow 3 business days for your refill to be completed.          Additional Information About Your Visit        MyChart Information     [employer] gives you secure access to your electronic health record. If you see a primary care provider, you can also send messages to your care team and make appointments. If you have questions, please call your primary care clinic.  If you do not have a primary care provider, please call [phone] and they will assist you.        Care EveryWhere ID     This is your Care EveryWhere ID. This could be used by other organizations to access your [facility] medical records  [id number]        Your Vitals Were     Pulse Temperature Respirations Height Pulse Oximetry BMI (Body Mass Index)    99 98.4  F (36.9  C) (Oral) 16 1.981 m (6' 5.99\") 97% 41.61 kg/m2       Blood Pressure from Last 3 Encounters:   01/08/18 117/82   12/27/17 120/78   12/26/17 119/78    Weight from Last 3 " Encounters:   01/08/18 (!) 163.3 kg (360 lb)   12/27/17 (!) 158.8 kg (350 lb)   12/12/17 (!) 159.2 kg (351 lb)              Today, you had the following     No orders found for display         Today's Medication Changes          These changes are accurate as of: 1/8/18 11:59 PM.  If you have any questions, ask your nurse or doctor.               These medicines have changed or have updated prescriptions.        Dose/Directions    * LORazepam 0.5 MG tablet   Commonly known as:  ATIVAN   This may have changed:  Another medication with the same name was added. Make sure you understand how and when to take each.   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 Make sure you understand how and when to take each.   Used for:  Cancer of distal third of esophagus (H)        Dose:  0.5 mg   Take 1 tablet (0.5 mg) by mouth every 4 hours as needed (Anxiety, Nausea/Vomiting or Sleep)   Quantity:  30 tablet   Refills:  2       * Notice:  This list has 2 medication(s) that are the same as other medications prescribed for you. Read the directions carefully, and ask your doctor or other care provider to review them with you.      Stop taking these medicines if you haven't already. Please contact your care team if you have questions.     dexamethasone 4 MG tablet   Commonly known as:  DECADRON   Stopped by:  Kadi Dee MD                Where to get your medicines      These medications were sent to [address] [place] [address] [address]  [address]    Hours:  TRANSPLANT PHONE NUMBER [phone] Phone:  [phone]     prochlorperazine 10 MG tablet         Some of these will need a paper prescription and others can be bought over the counter.  Ask your nurse if you have questions.     Bring a paper prescription for each of these medications     LORazepam 0.5 MG tablet                Primary Care Provider Fax #    Physician No Ref-Primary [phone]       No address on file        Equal Access to Services     OLLIE SHARPE : Hadii antonio ku hadasho Soomaali, waaxda luqadaha, qaybta kaalmada adeegyada, waxay johnnyin valorieotilio paintingpalthao [place] . So [facility] [phone].    ATENCIÓN: Si angelikala espjames, tiene a alamo disposición servicios gratuitos de asistencia lingüística. Llame al [phone].    We comply with applicable federal civil rights laws and [place] laws. We do not discriminate on the basis of race, color, national origin, age, disability, sex, sexual orientation, or gender identity.            Thank you!     Thank you for choosing [facility]  for your care. Our goal is always to provide you with excellent care. Hearing back from our patients is one way we can continue to improve our services. Please take a few minutes to complete the written survey that you may receive in the mail after your visit with us. Thank you!             Your Updated Medication List - Protect others around you: Learn how to safely use, store and throw away your medicines at www.disposemymeds.org.          This list is accurate as of: 1/8/18 11:59 PM.  Always use your most recent med list.                   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)       fish oil-omega-3 fatty acids 1000 MG capsule      Take 2 g by mouth daily        IBUPROFEN PO      Take 600 mg by mouth every 4 hours as  needed for moderate pain        lidocaine-prilocaine cream    EMLA    30 g    Apply topically as needed for moderate pain    Cancer of distal third of esophagus (H)       * LORazepam 0.5 MG tablet    ATIVAN    60 tablet    Take 1 tablet (0.5 mg) by mouth every 6 hours as needed for anxiety    Cancer of distal third of esophagus (H), 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       * LORazepam 0.5 MG tablet    ATIVAN    30 tablet    Take 1 tablet (0.5 mg) by mouth every 4 hours as needed (Anxiety, Nausea/Vomiting or Sleep)    Cancer of distal third of esophagus (H)       Multi-vitamin Tabs tablet      Take 1 tablet by mouth daily        ondansetron 8 MG tablet    ZOFRAN    30 tablet    Take 1 tablet (8 mg) by mouth every 8 hours as needed (Nausea/Vomiting)    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 Read the directions carefully, and ask your doctor or other care provider to review them with you.

## 2018-01-08 NOTE — PROGRESS NOTES
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 His tumor is positive for cytokeratin 20, negative for P63 and CK7, and HER2 is negative. He started on FOLFOX (5FU/oxaliplatin) on 5/15/2017. He had a delay in treatment from 9/5-10/2/17 due to work related injury.    He had an excellent response by imaging throughout the summer 2017.    He a mixed response by imaging in late fall 2017.      INTERVAL HISTORY: At the time I last saw Levi, we discussed the possibility that he is starting to have breakthrough from his treatment.  At that time, given his overall mild changes in his disease we discussed continuing him on FOLFOX.  He wanted to obtain a second opinion and went down to the [facility] in 12/2017 for consultation.  They were reassuring to Levi to continue on his current course of therapy.  He did have repeat PET/CTs at that time suggesting progressive disease.       Levi and I touched base over the telephone.  We discussed obtaining a biopsy for Foundation One testing to be performed.  His biopsy was obtained.  His Foundation One testing is currently pending.      He returns today.  He reports overall that he is he is feeling reasonably well.  He feels that he is ready to start on the next course of therapy and has questions about this overall.      He has no fevers or chills, no chest pain or shortness of breath.  He has no nausea, vomiting, diarrhea or constipation.  His neuropathy has improved.  He has just tingling in his fingertips.  He has some numbness and tingling up through his calves.  This is overall is much improved.  He has no issues with balance.  He would like to know the next steps for therapy.        A complete 10-pt ROS is negative other than what is reported above      PHYSICAL EXAMINATION  BP  "117/82 (BP Location: Left arm, Patient Position: Chair, Cuff Size: Adult Large)  Pulse 99  Temp 98.4  F (36.9  C) (Oral)  Resp 16  Ht 1.981 m (6' 5.99\")  Wt (!) 163.3 kg (360 lb)  SpO2 97%  BMI 41.61 kg/m2 /90  Wt Readings from Last 4 Encounters:   01/08/18 (!) 163.3 kg (360 lb)   12/27/17 (!) 158.8 kg (350 lb)   12/12/17 (!) 159.2 kg (351 lb)   12/11/17 (!) 159.9 kg (352 lb 9.6 oz)     Constitutional: Alert, oriented male in no visible distress.  Eyes: PERRL. Anicteric sclerae.  ENT/Mouth: OM moist and pink without lesions or thrush.  CV: RRR  Resp: CTAB throughout  Abdomen: Soft, non-tender, non-distended. Obese. Bowel sounds present. Unable to palpate liver or spleen.   Extremities: No lower extremity edema   Skin: Warm, dry. mild venous stasis changes on LE bilat.  No spine tenderness  Lymph: No cervical or supraclavicular lymphadenopathy appreciated.   Neuro: CN II-XII grossly intact.  Able to walk on tiptoes and heels.  Absent reflexed at knees bilaterally    ECOG PS: 1    LABS:  Lab Results   Component Value Date    WBC 3.7 01/08/2018     Lab Results   Component Value Date    RBC 4.51 01/08/2018     Lab Results   Component Value Date    HGB 13.9 01/08/2018     Lab Results   Component Value Date    HCT 42.3 01/08/2018     No components found for: MCT  Lab Results   Component Value Date    MCV 94 01/08/2018     Lab Results   Component Value Date    MCH 30.8 01/08/2018     Lab Results   Component Value Date    MCHC 32.9 01/08/2018     Lab Results   Component Value Date    RDW 13.2 01/08/2018     Lab Results   Component Value Date     01/08/2018       Recent Labs   Lab Test  01/08/18   1204  12/11/17   1314   NA  141  140   POTASSIUM  4.0  4.0   CHLORIDE  109  107   CO2  26  24   ANIONGAP  6  9   GLC  98  101*   BUN  10  13   CR  0.83  0.85   NIDHI  9.1  9.2     Liver Function Studies -   Recent Labs   Lab Test  01/08/18   1204   PROTTOTAL  7.5   ALBUMIN  3.4   BILITOTAL  0.5   ALKPHOS  92   AST "  29   ALT  32         IMPRESSION/PLAN:  1. Metastatic esophogeal cancer. On FOLFOX since May 2017, with dose reduction of oxaliplatin due to neuropathy. He had partial response on imaging in September. Treatment was held in September due to a traumatic work-related injury resulting in multiple rib and spine fractures. He resumed chemotherapy on 10/2 and was tolerating therapy reasonably well .    1.  He had progression of disease by PET/CT in 12/2017.  He underwent biopsy for Foundation One testing which is currently pending.      I discussed with him that I would like him to start Cyramza and Taxol as second-line chemotherapy.  The Taxol is given weekly 3 out of 4 weeks with Cyramza every other week.  I discussed the side effects of this treatment including myelosuppression, nausea, vomiting, diarrhea, constipation, hair loss, blood loss, bleeding, increased risk of clotting, and allergic reaction.  Consent for chemotherapy was obtained.  We discussed that Keytruda is approved for third-line testing.  He will have PD-1 testing performed on this biopsy.       I will await the results of his Foundation One testing to discuss other prognostic information and possible clinical trial use.  Given his recent PE, I would like him to hold Cyramza the first 2 weeks.  If he is tolerating this, we may add it with week 3.       2.  Pulmonary embolism.  This was originally identified on staging in 07/2017.  He was on Xarelto.  At his restaging evaluation he had progressive symptoms with more clot burden and was started on Lovenox 1 mg/kg twice daily.      3.  His nausea is improved with Emend and Decadron.      4.  Neuropathy.  This will need to continue to be monitored closely.  Overall, this is grade 1.  This has improved with lack of therapy over the course of the last several weeks.  He understands this may get worse while on Taxol.

## 2018-01-08 NOTE — MR AVS SNAPSHOT
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     If you are having any concerning symptoms or wish to speak to a provider before your next infusion visit, please call your care coordinator or triage to notify them so we can adequately serve you.     If you need a refill on a narcotic prescription or other medication, please call triage before your infusion appointment.           January 2018 Sunday Monday Tuesday Wednesday Thursday Friday Saturday        1     2     3     4     5     6       7     8     [facility] LAB DRAW   11:30 AM   (15 min.)    M[facility]ONIC LAB DRAW   [facility] Lab Draw     P RETURN   11:45 AM   (30 min.)   Kadi Dee MD   [facility]    1:00 PM   (240 min.)    ONCOLOGY INFUSION   [facility] 9     10     11     12     13       14     15     [facility] DRAW    8:45 AM   (15 min.)    [facility] LAB DRAW   [facility] Lab Draw     [facility] ONC INFUSION 120    9:30 AM   (120 min.)    ONCOLOGY INFUSION   [facility] 16     17     18     19     20       21     22     [facility] DRAW    8:45 AM   (15  min.)    [facility] LAB DRAW   [facility] Lab Draw     [facility] RETURN    9:05 AM   (50 min.)   Loraine Sutherland PA-C   [facility]     [facility] ONC INFUSION 180   10:30 AM   (180 min.)    ONCOLOGY INFUSION   [facility] 23     24     25     26     27       28     29     30     31 February 2018 Sunday Monday Tuesday Wednesday Thursday Friday Saturday                       1     2     3       4     5     [facility] DRAW    9:30 AM   (15 min.)    [facility] LAB DRAW   [facility] Lab Draw     [facility] RETURN    9:55 AM   (50 min.)   Loraine Sutherland PA-C   [facility]     [facility] ONC INFUSION 180   11:00 AM   (180 min.)    ONCOLOGY INFUSION   [facility] 6     7     8     9     10       11     12     [facility] DRAW    8:00 AM   (15 min.)    [facility]   [facility]     [facility] ONC INFUSION 120    8:30 AM   (120 min.)    ONCOLOGY INFUSION   [facility] 13     14     15     16     17       18     19     [facility] DRAW    7:30 AM   (15 min.)    [facility] LAB DRAW   [facility] Draw     [facility] ONC INFUSION 180    8:00 AM   (180 min.)    ONCOLOGY INFUSION   [facility] 20     21     22     23     24       25     26     27     28                                Recent Results (from the past 24 hour(s))   CBC with platelets differential    Collection Time: 01/08/18 12:04 PM   Result Value Ref Range    WBC 3.7 (L) 4.0 - 11.0 10e9/L    RBC Count 4.51 4.4 - 5.9 10e12/L    Hemoglobin 13.9 13.3 - 17.7 g/dL    Hematocrit 42.3 40.0 - 53.0 %    MCV 94 78 - 100 fl    MCH 30.8 26.5 - 33.0 pg    MCHC 32.9 31.5 - 36.5 g/dL    RDW 13.2 10.0 - 15.0 %    Platelet Count 134 (L) 150 - 450 10e9/L    Diff Method Automated Method     % Neutrophils 57.6 %    % Lymphocytes 31.0 %    % Monocytes 9.6 %    % Eosinophils 0.5 %    % Basophils 0.5 %    % Immature  Granulocytes 0.8 %    Nucleated RBCs 0 0 /100    Absolute Neutrophil 2.1 1.6 - 8.3 10e9/L    Absolute Lymphocytes 1.1 0.8 - 5.3 10e9/L    Absolute Monocytes 0.4 0.0 - 1.3 10e9/L    Absolute Eosinophils 0.0 0.0 - 0.7 10e9/L    Absolute Basophils 0.0 0.0 - 0.2 10e9/L    Abs Immature Granulocytes 0.0 0 - 0.4 10e9/L    Absolute Nucleated RBC 0.0    Comprehensive metabolic panel    Collection Time: 01/08/18 12:04 PM   Result Value Ref Range    Sodium 141 133 - 144 mmol/L    Potassium 4.0 3.4 - 5.3 mmol/L    Chloride 109 94 - 109 mmol/L    Carbon Dioxide 26 20 - 32 mmol/L    Anion Gap 6 3 - 14 mmol/L    Glucose 98 70 - 99 mg/dL    Urea Nitrogen 10 7 - 30 mg/dL    Creatinine 0.83 0.66 - 1.25 mg/dL    GFR Estimate >90 >60 mL/min/1.7m2    GFR Estimate If Black >90 >60 mL/min/1.7m2    Calcium 9.1 8.5 - 10.1 mg/dL    Bilirubin Total 0.5 0.2 - 1.3 mg/dL    Albumin 3.4 3.4 - 5.0 g/dL    Protein Total 7.5 6.8 - 8.8 g/dL    Alkaline Phosphatase 92 40 - 150 U/L    ALT 32 0 - 70 U/L    AST 29 0 - 45 U/L                 Follow-ups after your visit        Your next 10 appointments already scheduled     Dieudonne 15, 2018  8:45 AM CST   [facility] Lab Draw with  [facility] LAB DRAW   [facility] Lab Draw ([facility])    [address] [place] [address]  [address]  [facility] [id number] [phone]            Dieudonne 15, 2018  9:30 AM CST   Infusion 120 with UC ONCOLOGY INFUSION, UC 30 ATC   [facility] ([facility])    [address] [place] [address]  [address]  [facility] [id number] [phone]            Jan 22, 2018  8:45 AM CST   [facility] Lab Draw with UC [facility] LAB DRAW   [facility])    [address] [place] [address]  [address]  [facility] [id number] [phone]            Jan 22, 2018  9:20 AM CST   (Arrive by 9:05 AM)   Return Visit with SWAPNIL [facility]     [address]  [address]  [facility] [address]   [phone]            Jan 22, 2018 10:30 AM CST   Infusion 180 with UC ONCOLOGY INFUSION, UC 16 ATC   [facility] ([facility])    [address] [place] [address]  [address]  [facility] [address]   [phone]            Feb 05, 2018  9:30 AM CST   [facility] Lab Draw with UC [facility] LAB DRAW   [facility] Lab Draw ([facility])    [address] [place] [address]  [address]  [facility] [address]   [phone]            Feb 05, 2018 10:10 AM CST   (Arrive by 9:55 AM)   Return Visit with Loraine Sutherland PA-C   [facility] ([facility])    [address] [place] [address]  [address]  [facility] [address]   [phone]            Feb 05, 2018 11:00 AM CST   Infusion 180 with UC ONCOLOGY INFUSION   [facility] ([facility])    [address] [place] [address]  [address]  [facility] [address]   [phone]              Who to contact     If you have questions or need follow up information about today's clinic visit or your schedule please contact [facility] directly at [phone].  Normal or non-critical lab and imaging results will be communicated to you by [employer], letter or phone within 4 business days after the clinic has received the results. If you do not hear from us within 7 days, please contact the clinic through [employer] or phone. If you have a critical or abnormal lab result, we will notify you by phone as soon as possible.  Submit refill requests through [employer] or call your pharmacy and they will forward the refill request to us. Please allow 3 business days for your refill to be completed.          Additional Information About Your Visit        [employer] Information     [employer] gives you secure access to your electronic health record. If you see a primary care provider, you can also send  messages to your care team and make appointments. If you have questions, please call your primary care clinic.  If you do not have a primary care provider, please call [phone] and they will assist you.        Care EveryWhere ID     This is your Care EveryWhere ID. This could be used by other organizations to access your [place] medical records  [id number]         Blood Pressure from Last 3 Encounters:   01/08/18 117/82   12/27/17 120/78   12/26/17 119/78    Weight from Last 3 Encounters:   01/08/18 (!) 163.3 kg (360 lb)   12/27/17 (!) 158.8 kg (350 lb)   12/12/17 (!) 159.2 kg (351 lb)              We Performed the Following     CBC with platelets differential     Comprehensive metabolic panel          Today's Medication Changes          These changes are accurate as of: 1/8/18  2:30 PM.  If you have any questions, ask your nurse or doctor.               These medicines have changed or have updated prescriptions.        Dose/Directions    * LORazepam 0.5 MG tablet   Commonly known as:  ATIVAN   This may have changed:  Another medication with the same name was added. Make sure you understand how and when to take each.   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 Make sure you understand how and when to take each.   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 Read the directions carefully, and ask your doctor or other care provider to review them with you.      Stop taking these medicines if you haven't already. Please contact your care team if you have questions.     dexamethasone 4 MG tablet   Commonly known as:  DECADRON   Stopped by:  Kadi Dee MD                Where to get your medicines      These medications were sent to [place], [place] - [address]  [address], [facility] [address]    Hours:  TRANSPLANT PHONE NUMBER [phone] Phone:  [phone]     prochlorperazine 10 MG tablet         Some of these will need a paper prescription and others can be bought over the counter.  Ask your nurse if you have questions.     Bring a paper prescription for each of these medications     LORazepam 0.5 MG tablet                Primary Care Provider Fax #    Physician No Ref-Primary [phone]       No address on file        Equal Access to Services     OLLIE SHARPE : Hadii aad ku hadasho Sonigel, waaxda luqadaha, qaybta kaalmada adeegyada, waxay johnnyin keila [place] . [facility] [phone].    ATENCIÓN: Si habla español, tiene a alamo disposición servicios gratuitos de asistencia lingüística. Llame al [phone].    We comply with applicable federal civil rights laws and [place] laws. We do not discriminate on the basis of race, color, national origin, age, disability, sex, sexual orientation, or gender identity.            Thank you!     Thank you for choosing [facility]  for your care. Our goal is always to provide you with excellent care. Hearing back from our patients is one way we can continue to improve our services. Please take a few minutes to complete the written survey that you may receive in the mail after your visit with us. Thank you!             Your Updated Medication List - Protect others around you: Learn how to safely  use, store and throw away your medicines at www.disposemymeds.org.          This list is accurate as of: 1/8/18  2:30 PM.  Always use your most recent med list.                   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)       * LORazepam 0.5 MG tablet    ATIVAN    60 tablet    Take 1 tablet (0.5 mg) by mouth every 6 hours as needed for anxiety    Cancer of distal third of esophagus (H), 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       * LORazepam 0.5 MG tablet    ATIVAN    30 tablet    Take 1 tablet (0.5 mg) by mouth every 4 hours as needed (Anxiety, Nausea/Vomiting or Sleep)    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)       Multi-vitamin Tabs tablet      Take 1 tablet by mouth daily        ondansetron 8 MG tablet    ZOFRAN    30 tablet    Take 1 tablet (8 mg) by mouth every 8 hours as needed (Nausea/Vomiting)    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 Read the directions carefully, and ask  your doctor or other care provider to review them with you.

## 2018-01-08 NOTE — PROGRESS NOTES
Infusion Nursing Note:  Reuben Padilla presents today for Cycle 1 Day 1 Taxol.    Patient seen by provider today: Yes: Dr. Kadi Dee MD.    Intravenous Access:  Implanted Port.    Treatment Conditions:  Lab Results   Component Value Date    HGB 13.9 01/08/2018     Lab Results   Component Value Date    WBC 3.7 01/08/2018      Lab Results   Component Value Date    ANEU 2.1 01/08/2018     Lab Results   Component Value Date     01/08/2018      Lab Results   Component Value Date     01/08/2018                   Lab Results   Component Value Date    POTASSIUM 4.0 01/08/2018           No results found for: MAG         Lab Results   Component Value Date    CR 0.83 01/08/2018                   Lab Results   Component Value Date    NIDHI 9.1 01/08/2018                Lab Results   Component Value Date    BILITOTAL 0.5 01/08/2018           Lab Results   Component Value Date    ALBUMIN 3.4 01/08/2018                    Lab Results   Component Value Date    ALT 32 01/08/2018           Lab Results   Component Value Date    AST 29 01/08/2018     Results reviewed, labs MET treatment parameters, ok to proceed with treatment.    Note: Patient is receiving Taxol for the first time today.  Verbally reviewed side effects, take home medications, and infusion schedule with patient.  Answered all questions at this time.    1/8/17 1305 TORB:  Dr. Kadi Dee MD/Jonn Multani RN  - Changed patient's regimen to Taxol today.  He will not start Cyramza until day 15.    First dose of Taxol titrated today.    Post Infusion Assessment:  Patient tolerated infusion without incident.  Blood return noted pre and post infusion.  Access discontinued per protocol.    Discharge Plan:   Prescription refills given for Ambien.  Discharge instructions reviewed with: Patient and Family.  Patient and/or family verbalized understanding of discharge instructions and all questions answered.  Copy of AVS reviewed with patient and/or family.  Patient  will return 1/15/18 for next appointment.   Patient discharged in stable condition accompanied by: sister.  Departure Mode: Ambulatory.    MONTSE SHETTY RN

## 2018-01-08 NOTE — LETTER
1/8/2018       RE: Reuben Padilla  [address] [place] [id number]     Dear Colleague,    Thank you for referring your patient, Reuben Padilla, to the [facility]. Please see a copy of my visit note below.    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 His tumor is positive for cytokeratin 20, negative for P63 and CK7, and HER2 is negative. He started on FOLFOX (5FU/oxaliplatin) on 5/15/2017. He had a delay in treatment from 9/5-10/2/17 due to work related injury.    He had an excellent response by imaging throughout the summer 2017.    He a mixed response by imaging in late fall 2017.      INTERVAL HISTORY: At the time I last saw Levi, we discussed the possibility that he is starting to have breakthrough from his treatment.  At that time, given his overall mild changes in his disease we discussed continuing him on FOLFOX.  He wanted to obtain a second opinion and went down to the [facility] in 12/2017 for consultation.  They were reassuring to Levi to continue on his current course of therapy.  He did have repeat PET/CTs at that time suggesting progressive disease.       Levi and I touched base over the telephone.  We discussed obtaining a biopsy for Foundation One testing to be performed.  His biopsy was obtained.  His Foundation One testing is currently pending.      He returns today.  He reports overall that he is he is feeling reasonably well.  He feels that he is ready to start on the next course of therapy and has questions about this overall.      He has no fevers or chills, no chest pain or shortness of breath.  He has no nausea, vomiting, diarrhea or constipation.  His neuropathy has improved.  He has just tingling in his fingertips.  He has some numbness and tingling up  "through his calves.  This is overall is much improved.  He has no issues with balance.  He would like to know the next steps for therapy.        A complete 10-pt ROS is negative other than what is reported above      PHYSICAL EXAMINATION  /82 (BP Location: Left arm, Patient Position: Chair, Cuff Size: Adult Large)  Pulse 99  Temp 98.4  F (36.9  C) (Oral)  Resp 16  Ht 1.981 m (6' 5.99\")  Wt (!) 163.3 kg (360 lb)  SpO2 97%  BMI 41.61 kg/m2 /90  Wt Readings from Last 4 Encounters:   01/08/18 (!) 163.3 kg (360 lb)   12/27/17 (!) 158.8 kg (350 lb)   12/12/17 (!) 159.2 kg (351 lb)   12/11/17 (!) 159.9 kg (352 lb 9.6 oz)     Constitutional: Alert, oriented male in no visible distress.  Eyes: PERRL. Anicteric sclerae.  ENT/Mouth: OM moist and pink without lesions or thrush.  CV: RRR  Resp: CTAB throughout  Abdomen: Soft, non-tender, non-distended. Obese. Bowel sounds present. Unable to palpate liver or spleen.   Extremities: No lower extremity edema   Skin: Warm, dry. mild venous stasis changes on LE bilat.  No spine tenderness  Lymph: No cervical or supraclavicular lymphadenopathy appreciated.   Neuro: CN II-XII grossly intact.  Able to walk on tiptoes and heels.  Absent reflexed at knees bilaterally    ECOG PS: 1    LABS:  Lab Results   Component Value Date    WBC 3.7 01/08/2018     Lab Results   Component Value Date    RBC 4.51 01/08/2018     Lab Results   Component Value Date    HGB 13.9 01/08/2018     Lab Results   Component Value Date    HCT 42.3 01/08/2018     No components found for: MCT  Lab Results   Component Value Date    MCV 94 01/08/2018     Lab Results   Component Value Date    MCH 30.8 01/08/2018     Lab Results   Component Value Date    MCHC 32.9 01/08/2018     Lab Results   Component Value Date    RDW 13.2 01/08/2018     Lab Results   Component Value Date     01/08/2018       Recent Labs   Lab Test  01/08/18   1204  12/11/17   1314   NA  141  140   POTASSIUM  4.0  4.0   CHLORIDE  " 109  107   CO2  26  24   ANIONGAP  6  9   GLC  98  101*   BUN  10  13   CR  0.83  0.85   NIDHI  9.1  9.2     Liver Function Studies -   Recent Labs   Lab Test  01/08/18   1204   PROTTOTAL  7.5   ALBUMIN  3.4   BILITOTAL  0.5   ALKPHOS  92   AST  29   ALT  32         IMPRESSION/PLAN:  1. Metastatic esophogeal cancer. On FOLFOX since May 2017, with dose reduction of oxaliplatin due to neuropathy. He had partial response on imaging in September. Treatment was held in September due to a traumatic work-related injury resulting in multiple rib and spine fractures. He resumed chemotherapy on 10/2 and was tolerating therapy reasonably well .    1.  He had progression of disease by PET/CT in 12/2017.  He underwent biopsy for Foundation One testing which is currently pending.      I discussed with him that I would like him to start Cyramza and Taxol as second-line chemotherapy.  The Taxol is given weekly 3 out of 4 weeks with Cyramza every other week.  I discussed the side effects of this treatment including myelosuppression, nausea, vomiting, diarrhea, constipation, hair loss, blood loss, bleeding, increased risk of clotting, and allergic reaction.  Consent for chemotherapy was obtained.  We discussed that Keytruda is approved for third-line testing.  He will have PD-1 testing performed on this biopsy.       I will await the results of his Foundation One testing to discuss other prognostic information and possible clinical trial use.  Given his recent PE, I would like him to hold Cyramza the first 2 weeks.  If he is tolerating this, we may add it with week 3.       2.  Pulmonary embolism.  This was originally identified on staging in 07/2017.  He was on Xarelto.  At his restaging evaluation he had progressive symptoms with more clot burden and was started on Lovenox 1 mg/kg twice daily.      3.  His nausea is improved with Emend and Decadron.      4.  Neuropathy.  This will need to continue to be monitored closely.  Overall,  this is grade 1.  This has improved with lack of therapy over the course of the last several weeks.  He understands this may get worse while on Taxol.            Again, thank you for allowing me to participate in the care of your patient.      Sincerely,    Kadi Dee MD

## 2018-01-09 ENCOUNTER — HOME INFUSION (PRE-WILLOW HOME INFUSION) (OUTPATIENT)
Dept: PHARMACY | Facility: CLINIC | Age: 58
End: 2018-01-09

## 2018-01-09 DIAGNOSIS — K76.9 LIVER LESION: Primary | ICD-10-CM

## 2018-01-15 ENCOUNTER — DOCUMENTATION ONLY (OUTPATIENT)
Dept: ONCOLOGY | Facility: CLINIC | Age: 58
End: 2018-01-15

## 2018-01-15 ENCOUNTER — APPOINTMENT (OUTPATIENT)
Dept: LAB | Facility: CLINIC | Age: 58
End: 2018-01-15
Attending: PHYSICIAN ASSISTANT
Payer: COMMERCIAL

## 2018-01-15 ENCOUNTER — ONCOLOGY VISIT (OUTPATIENT)
Dept: ONCOLOGY | Facility: CLINIC | Age: 58
End: 2018-01-15
Attending: INTERNAL MEDICINE
Payer: COMMERCIAL

## 2018-01-15 ENCOUNTER — INFUSION THERAPY VISIT (OUTPATIENT)
Dept: ONCOLOGY | Facility: CLINIC | Age: 58
End: 2018-01-15
Attending: PHYSICIAN ASSISTANT
Payer: COMMERCIAL

## 2018-01-15 VITALS
HEART RATE: 102 BPM | SYSTOLIC BLOOD PRESSURE: 123 MMHG | DIASTOLIC BLOOD PRESSURE: 84 MMHG | TEMPERATURE: 98.1 F | RESPIRATION RATE: 18 BRPM | OXYGEN SATURATION: 96 %

## 2018-01-15 DIAGNOSIS — R55 SYNCOPE, UNSPECIFIED SYNCOPE TYPE: Primary | ICD-10-CM

## 2018-01-15 DIAGNOSIS — C15.5 CANCER OF DISTAL THIRD OF ESOPHAGUS (H): Primary | ICD-10-CM

## 2018-01-15 LAB
ANION GAP SERPL CALCULATED.3IONS-SCNC: 7 MMOL/L (ref 3–14)
BASOPHILS # BLD AUTO: 0 10E9/L (ref 0–0.2)
BASOPHILS NFR BLD AUTO: 0.3 %
BUN SERPL-MCNC: 16 MG/DL (ref 7–30)
CALCIUM SERPL-MCNC: 8.4 MG/DL (ref 8.5–10.1)
CHLORIDE SERPL-SCNC: 110 MMOL/L (ref 94–109)
CO2 SERPL-SCNC: 24 MMOL/L (ref 20–32)
CREAT SERPL-MCNC: 0.8 MG/DL (ref 0.66–1.25)
DIFFERENTIAL METHOD BLD: ABNORMAL
EOSINOPHIL # BLD AUTO: 0 10E9/L (ref 0–0.7)
EOSINOPHIL NFR BLD AUTO: 0.3 %
ERYTHROCYTE [DISTWIDTH] IN BLOOD BY AUTOMATED COUNT: 12.9 % (ref 10–15)
GFR SERPL CREATININE-BSD FRML MDRD: >90 ML/MIN/1.7M2
GLUCOSE SERPL-MCNC: 118 MG/DL (ref 70–99)
HCT VFR BLD AUTO: 41.3 % (ref 40–53)
HGB BLD-MCNC: 13.8 G/DL (ref 13.3–17.7)
IMM GRANULOCYTES # BLD: 0 10E9/L (ref 0–0.4)
IMM GRANULOCYTES NFR BLD: 0.8 %
LYMPHOCYTES # BLD AUTO: 1.2 10E9/L (ref 0.8–5.3)
LYMPHOCYTES NFR BLD AUTO: 31.8 %
MAGNESIUM SERPL-MCNC: 1.7 MG/DL (ref 1.6–2.3)
MCH RBC QN AUTO: 30.9 PG (ref 26.5–33)
MCHC RBC AUTO-ENTMCNC: 33.4 G/DL (ref 31.5–36.5)
MCV RBC AUTO: 92 FL (ref 78–100)
MONOCYTES # BLD AUTO: 0.2 10E9/L (ref 0–1.3)
MONOCYTES NFR BLD AUTO: 4.4 %
NEUTROPHILS # BLD AUTO: 2.4 10E9/L (ref 1.6–8.3)
NEUTROPHILS NFR BLD AUTO: 62.4 %
NRBC # BLD AUTO: 0 10*3/UL
NRBC BLD AUTO-RTO: 0 /100
PLATELET # BLD AUTO: 151 10E9/L (ref 150–450)
POTASSIUM SERPL-SCNC: 3.6 MMOL/L (ref 3.4–5.3)
RBC # BLD AUTO: 4.47 10E12/L (ref 4.4–5.9)
SODIUM SERPL-SCNC: 141 MMOL/L (ref 133–144)
TROPONIN I SERPL-MCNC: <0.015 UG/L (ref 0–0.04)
WBC # BLD AUTO: 3.9 10E9/L (ref 4–11)

## 2018-01-15 PROCEDURE — 84484 ASSAY OF TROPONIN QUANT: CPT | Performed by: PHYSICIAN ASSISTANT

## 2018-01-15 PROCEDURE — 25000132 ZZH RX MED GY IP 250 OP 250 PS 637: Mod: ZF | Performed by: INTERNAL MEDICINE

## 2018-01-15 PROCEDURE — 96413 CHEMO IV INFUSION 1 HR: CPT

## 2018-01-15 PROCEDURE — 25000128 H RX IP 250 OP 636: Mod: ZF | Performed by: PHYSICIAN ASSISTANT

## 2018-01-15 PROCEDURE — 96361 HYDRATE IV INFUSION ADD-ON: CPT

## 2018-01-15 PROCEDURE — 99214 OFFICE O/P EST MOD 30 MIN: CPT | Mod: ZP | Performed by: PHYSICIAN ASSISTANT

## 2018-01-15 PROCEDURE — 25000125 ZZHC RX 250: Mod: ZF | Performed by: INTERNAL MEDICINE

## 2018-01-15 PROCEDURE — 83735 ASSAY OF MAGNESIUM: CPT | Performed by: PHYSICIAN ASSISTANT

## 2018-01-15 PROCEDURE — 80048 BASIC METABOLIC PNL TOTAL CA: CPT | Performed by: PHYSICIAN ASSISTANT

## 2018-01-15 PROCEDURE — 25000128 H RX IP 250 OP 636: Mod: ZF | Performed by: INTERNAL MEDICINE

## 2018-01-15 PROCEDURE — 96375 TX/PRO/DX INJ NEW DRUG ADDON: CPT

## 2018-01-15 PROCEDURE — 85025 COMPLETE CBC W/AUTO DIFF WBC: CPT | Performed by: INTERNAL MEDICINE

## 2018-01-15 RX ORDER — HEPARIN SODIUM (PORCINE) LOCK FLUSH IV SOLN 100 UNIT/ML 100 UNIT/ML
500 SOLUTION INTRAVENOUS ONCE
Status: COMPLETED | OUTPATIENT
Start: 2018-01-15 | End: 2018-01-15

## 2018-01-15 RX ORDER — HEPARIN SODIUM (PORCINE) LOCK FLUSH IV SOLN 100 UNIT/ML 100 UNIT/ML
5 SOLUTION INTRAVENOUS DAILY PRN
Status: DISCONTINUED | OUTPATIENT
Start: 2018-01-15 | End: 2018-01-15 | Stop reason: HOSPADM

## 2018-01-15 RX ORDER — DIPHENHYDRAMINE HCL 25 MG
50 CAPSULE ORAL ONCE
Status: COMPLETED | OUTPATIENT
Start: 2018-01-15 | End: 2018-01-15

## 2018-01-15 RX ORDER — EPINEPHRINE 0.3 MG/.3ML
INJECTION SUBCUTANEOUS
Status: DISCONTINUED
Start: 2018-01-15 | End: 2018-01-15 | Stop reason: WASHOUT

## 2018-01-15 RX ADMIN — SODIUM CHLORIDE, PRESERVATIVE FREE 500 UNITS: 5 INJECTION INTRAVENOUS at 11:58

## 2018-01-15 RX ADMIN — PACLITAXEL 240 MG: 6 INJECTION, SOLUTION INTRAVENOUS at 10:43

## 2018-01-15 RX ADMIN — SODIUM CHLORIDE, PRESERVATIVE FREE 5 ML: 5 INJECTION INTRAVENOUS at 07:34

## 2018-01-15 RX ADMIN — DIPHENHYDRAMINE HYDROCHLORIDE 50 MG: 25 CAPSULE ORAL at 10:13

## 2018-01-15 RX ADMIN — DEXAMETHASONE SODIUM PHOSPHATE 20 MG: 10 INJECTION, SOLUTION INTRAMUSCULAR; INTRAVENOUS at 10:14

## 2018-01-15 RX ADMIN — FAMOTIDINE 20 MG: 20 INJECTION, SOLUTION INTRAVENOUS at 10:20

## 2018-01-15 RX ADMIN — SODIUM CHLORIDE 250 ML: 9 INJECTION, SOLUTION INTRAVENOUS at 10:13

## 2018-01-15 RX ADMIN — SODIUM CHLORIDE 1000 ML: 9 INJECTION, SOLUTION INTRAVENOUS at 07:45

## 2018-01-15 ASSESSMENT — PAIN SCALES - GENERAL: PAINLEVEL: NO PAIN (0)

## 2018-01-15 NOTE — PROGRESS NOTES
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 His tumor is positive for cytokeratin 20, negative for P63 and CK7, and HER2 is negative. He started on FOLFOX (5FU/oxaliplatin) on 5/15/2017. He had a delay in treatment from 9/5-10/2/17 due to work related injury. He had an excellent response by imaging throughout the summer 2017. He had progression of disease by PET/CT in 12/2017.  He underwent biopsy for Foundation One testing which is currently pending.  Plan was changed to Taxol and Cymramza, although Cyramza on hold for 2 weeks due to recent PE. Please see previous notes for further details on the patient's history. He was in lab today prior to infusion appointment when he had syncopal event.    Interval History:  Patient states he had already had port accessed and had just had labs drawn. He got up and was talking to the lab nurse when he started feeling lightheaded. He sat himself down in the chair and then was told later he had passed out for a few seconds. RRT was called. BP was 100/70 and pulse 100 per chart. He states he recalls feeling cold and clammy. He denies hitting his head or any pain anywhere. He states the next thing he remembers was lying back in chair. He states he has had 4 similar syncopal in the past surrounding blood draws. He states he did have breakfast this morning. He denies any nausea, palpitations, chest pain, pleuritic pain, SOB. He has since been up to use the bathroom after being in infusion without any repeat of lightheadedness, even though his SBP now is in the 90s and he usually is in the 110s-120s. He does have history of atrial fibrillation, but he has never experienced any symptoms. He was seen by cardiology here last August. He was on  rivaroxaban at the time, so no additional intervention recommended at the time. He has been on Lovenox 130 mg SC BID since mid-December and he states he has not missed any doses. He usually administers at 9:30-10AM and PM. He has the lovenox with him and has not given it yet to himself this morning.    Review of Systems:  Patient denies any of the following except if noted above: fevers, chills, nausea, vomiting, headaches, numbness, tingling, bleeding or bruising issues.    Current Outpatient Prescriptions   Medication Sig Dispense Refill     LORazepam (ATIVAN) 0.5 MG tablet Take 1 tablet (0.5 mg) by mouth every 4 hours as needed (Anxiety, Nausea/Vomiting or Sleep) (Patient not taking: Reported on 1/15/2018) 30 tablet 2     prochlorperazine (COMPAZINE) 10 MG tablet Take 1 tablet (10 mg) by mouth every 6 hours as needed (Nausea/Vomiting) (Patient not taking: Reported on 1/15/2018) 30 tablet 2     enoxaparin (LOVENOX) 80 MG/0.8ML injection Inject 1.3 mLs (130 mg) Subcutaneous 2 times daily 60 Syringe 3     IBUPROFEN PO Take 600 mg by mouth every 4 hours as needed for moderate pain       [DISCONTINUED] dexamethasone (DECADRON) 4 MG tablet Take two tablets daily on days 2,3 and then 1 tablet daily days 4,5 6 tablet 1     zolpidem (AMBIEN) 10 MG tablet Take 1 tablet (10 mg) by mouth nightly as needed 60 tablet 1     ondansetron (ZOFRAN) 8 MG tablet Take 1 tablet (8 mg) by mouth every 8 hours as needed (Nausea/Vomiting) (Patient not taking: Reported on 11/28/2017) 30 tablet 2     lidocaine-prilocaine (EMLA) cream Apply topically as needed for moderate pain 30 g 3     multivitamin, therapeutic with minerals (MULTI-VITAMIN) TABS tablet Take 1 tablet by mouth daily       fish oil-omega-3 fatty acids 1000 MG capsule Take 2 g by mouth daily       timolol (TIMOPTIC) 0.5 % ophthalmic solution Place into both eyes daily         Physical Examination:  General: The patient is a pleasant male, seated in infusion chair, in no acute  distress.   There were no vitals taken for this visit.  Wt Readings from Last 10 Encounters:   01/08/18 (!) 163.3 kg (360 lb)   12/27/17 (!) 158.8 kg (350 lb)   12/12/17 (!) 159.2 kg (351 lb)   12/11/17 (!) 159.9 kg (352 lb 9.6 oz)   11/28/17 (!) 163.1 kg (359 lb 9.6 oz)   11/13/17 (!) 163.8 kg (361 lb 3.2 oz)   10/30/17 (!) 159.3 kg (351 lb 4.8 oz)   10/17/17 (!) 162.2 kg (357 lb 9.6 oz)   10/02/17 (!) 160.1 kg (353 lb)   09/05/17 (!) 166.1 kg (366 lb 1.6 oz)     HEENT: EOMI, PERRL. Sclerae are anicteric.   Heart: Irregular rate and rhythm.   Lungs: Clear to auscultation bilaterally.   Extremities: Hyperpigmentation of right leg. Right leg a little more swollen than left but patient states this is chronic and unchanged.   Neuro: grossly non-focal  Skin: No rashes, petechiae, or bruising noted on exposed skin.    Laboratory Data:  Results for orders placed or performed in visit on 01/15/18 (from the past 24 hour(s))   EKG 12-lead, complete   Result Value Ref Range    Interpretation ECG Click View Image link to view waveform and result      Results for KORI CHANEY (MRN [id number]) as of 1/15/2018 10:34   1/15/2018 09:20   Sodium 141   Potassium 3.6   Chloride 110 (H)   Carbon Dioxide 24   Urea Nitrogen 16   Creatinine 0.80   GFR Estimate >90   GFR Estimate If Black >90   Calcium 8.4 (L)   Anion Gap 7   Magnesium 1.7   Troponin I ES <0.015   Glucose 118 (H)     Results for KORI CHANEY (MRN [id number]) as of 1/15/2018 10:34   1/15/2018 07:42   WBC 3.9 (L)   Hemoglobin 13.8   Hematocrit 41.3   Platelet Count 151   RBC Count 4.47   MCV 92   MCH 30.9   MCHC 33.4   RDW 12.9   Diff Method Automated Method   % Neutrophils 62.4   % Lymphocytes 31.8   % Monocytes 4.4   % Eosinophils 0.3   % Basophils 0.3   % Immature Granulocytes 0.8   Nucleated RBCs 0   Absolute Neutrophil 2.4   Absolute Lymphocytes 1.2   Absolute Monocytes 0.2   Absolute Eosinophils 0.0   Absolute Basophils 0.0   Abs Immature Granulocytes 0.0    Absolute Nucleated RBC 0.0     Assessment and Plan:    Syncope. He has several possible etiologies. EKG in infusion with atrial fibrillation, rate 92. His heart rate has fluctuated from 64 to 102 this visit on repeat VS check, but does not appear to be in RVR. His normal SBP is in 110s-120s, but he appears to be tolerating SBP in 90s in infusion without symptoms, even when up and ambulatory. He also had Chest CT on 12/14/17 with PE and extensive clot burden. However, he has been taking lovenox as directed, hep 10a level in December was 1.02 at this dose, and he is not hypoxic. He tells me he has had 4 previous episodes of syncope or near syncope that are similar, always surrounding blood draws. BMP checked and electrolytes WNL. Trop negative. Most likely this is vaso-vagal in etiology.     Metastatic esophogeal cancer.  He had progression of disease by PET/CT in 12/2017.  He underwent biopsy for Foundation One testing which is currently pending.  Plan was changed to Taxol and Cymramza, although Cyramza on hold for 2 weeks due to recent PE. He is scheduled for C1D1 Taxol today. Spoke with Dr. Dee. Ok to proceed with taxol today as scheduled.    Atrial fibrillation: Evaluated by cardiology on 8/21/17. They recommended discontinuing ASA as he was on rivaroxaban at the time for PE. He was supposed to follow up in 3 months, but it does not appear this occurred. His HR is mostly in 80s-90s. Range today has been . He appears to be asymptomatic when seen in infusion center.  --Will refer to cardiology if becomes symptomatic     H/o PE: This was originally identified on staging in 07/2017.  He was on Xarelto.  At his restaging evaluation he had progressive symptoms with more clot burden and was started on Lovenox 1 mg/kg twice daily. Hep 10a was 1.02 on 12/26/17. Dr. Dee would like repeat hep 10a level checked. As patient had not yet administered his AM lovenox and was unwilling to stay 4 hours for a lab draw,  will attempt to get 10a next visit on 1/22.  --spoke with infusion RN and asked her to tell patient to change lovenox to 8am/8pm schedule. Will order 10a level for noon on 1/22.      Amparo Sow PA-C  [facility] [address] [id number] [phone]

## 2018-01-15 NOTE — PROGRESS NOTES
"RRT called to 2nd floor lab.  I arrived and found patient post near syncope.  Patient got up and felt dizzy right after having labs draw.  He sat self in chair and said he \"passed out\" for a few seconds.  BP when I arrived 100/70 pulse 100.  Forehead moist.  Patient stated he is feeling better.  Patient has an infusion scheduled for later this morning.  I went to the infusion unit and asked if they could take him early and they said \"yes\".  I escorted patient in a wheelchair to his infusion bay and gave report of events to his infusion nurse.  "

## 2018-01-15 NOTE — PATIENT INSTRUCTIONS
Contact Numbers    [facility] Main Line: [phone]  [facility] Triage:  [phone]    Call triage with chills and/or temperature greater than or equal to 100.5, uncontrolled nausea/vomiting, diarrhea, constipation, dizziness, shortness of breath, chest pain, bleeding, unexplained bruising, or any new/concerning symptoms, questions/concerns.     If you are having any concerning symptoms or wish to speak to a provider before your next infusion visit, please call your care coordinator or triage to notify them so we can adequately serve you.       After Hours: [phone]    If after hours, weekends, or holidays, call main hospital  and ask for Oncology doctor on call.             January 2018 Sunday Monday Tuesday Wednesday Thursday Friday Saturday        1     2     3     4     5     6       7     8     [facility]   11:30 AM   (15 min.)   [facility]     [facility] RETURN   11:45 AM   (30 min.)   Kadi Dee MD   [facility]     [facility] INFUSION 240    1:00 PM   (240 min.)   [facility]ION   [facility] 9     10     11     12     13       14     15     [facility] RETURN    8:45 AM   (50 min.)   Amparo Sow PA   [facility]     [facility] DRAW    8:45 AM   (15 min.)   [facility]     [facility] INFUSION 120    9:30 AM   (120 min.)    ONCOLOGY INFUSION   [facility] 16     17     18     19     20       21     22     [facility] DRAW    8:45 AM   (15 min.)   [facility]   [facility]     [facility] RETURN    9:05 AM   (50 min.)   Loraine Sutherland PADestinyC   [facility]     [facility] ONC INFUSION 180   10:30 AM   (180 min.)    ONCOLOGY INFUSION   [facility] 23     24     25     26     27       28     29     30     31 February 2018 Sunday Monday Tuesday Wednesday Thursday  Friday Saturday                       1     2     3       4     5     [facility]    9:30 AM   (15 min.)    [facility]     [facility] RETURN    9:55 AM   (50 min.)   Loraine Sutherland PA-C   [facility]     [facility] INFUSION 180   11:00 AM   (180 min.)   [facility] INFUSION   [facility] 6     7     8     9     10       11     12     [facility] DRAW    8:00 AM   (15 min.)    [facility]   [facility]     [facility] INFUSION 120    8:30 AM   (120 min.)    ONCOLOGY INFUSION   [facility] 13     14     15     16     17       18     19     [facility] D[facility]    7:30 AM   (15 min.)    [facility]   [facility]     [facility] INFUSION 180    8:00 AM   (180 min.)    ONCOLOGY INFUSION   [facility] 20     21     22     23     24       25     26     27     28                                Recent Results (from the past 24 hour(s))   CBC with platelets differential    Collection Time: 01/15/18  7:42 AM   Result Value Ref Range    WBC 3.9 (L) 4.0 - 11.0 10e9/L    RBC Count 4.47 4.4 - 5.9 10e12/L    Hemoglobin 13.8 13.3 - 17.7 g/dL    Hematocrit 41.3 40.0 - 53.0 %    MCV 92 78 - 100 fl    MCH 30.9 26.5 - 33.0 pg    MCHC 33.4 31.5 - 36.5 g/dL    RDW 12.9 10.0 - 15.0 %    Platelet Count 151 150 - 450 10e9/L    Diff Method Automated Method     % Neutrophils 62.4 %    % Lymphocytes 31.8 %    % Monocytes 4.4 %    % Eosinophils 0.3 %    % Basophils 0.3 %    % Immature Granulocytes 0.8 %    Nucleated RBCs 0 0 /100    Absolute Neutrophil 2.4 1.6 - 8.3 10e9/L    Absolute Lymphocytes 1.2 0.8 - 5.3 10e9/L    Absolute Monocytes 0.2 0.0 - 1.3 10e9/L    Absolute Eosinophils 0.0 0.0 - 0.7 10e9/L    Absolute Basophils 0.0 0.0 - 0.2 10e9/L    Abs Immature Granulocytes 0.0 0 - 0.4 10e9/L    Absolute Nucleated RBC 0.0    EKG 12-lead, complete    Collection Time: 01/15/18  9:10 AM    Result Value Ref Range    Interpretation ECG Click View Image link to view waveform and result    Basic metabolic panel    Collection Time: 01/15/18  9:20 AM   Result Value Ref Range    Sodium 141 133 - 144 mmol/L    Potassium 3.6 3.4 - 5.3 mmol/L    Chloride 110 (H) 94 - 109 mmol/L    Carbon Dioxide 24 20 - 32 mmol/L    Anion Gap 7 3 - 14 mmol/L    Glucose 118 (H) 70 - 99 mg/dL    Urea Nitrogen 16 7 - 30 mg/dL    Creatinine 0.80 0.66 - 1.25 mg/dL    GFR Estimate >90 >60 mL/min/1.7m2    GFR Estimate If Black >90 >60 mL/min/1.7m2    Calcium 8.4 (L) 8.5 - 10.1 mg/dL   Magnesium    Collection Time: 01/15/18  9:20 AM   Result Value Ref Range    Magnesium 1.7 1.6 - 2.3 mg/dL   Troponin I    Collection Time: 01/15/18  9:20 AM   Result Value Ref Range    Troponin I ES <0.015 0.000 - 0.045 ug/L

## 2018-01-15 NOTE — MR AVS SNAPSHOT
After Visit Summary   1/15/2018    Reuben Padilla    MRN: [id number]           Patient Information     Date Of Birth          1960        Visit Information        Provider Department      1/15/2018 9:30 AM  30 ATC;  ONCOLOGY INFUSION [facility]        Today's Diagnoses     Cancer of distal third of esophagus (H)    -  1      Care Instructions    Contact Numbers    [facility] [place]: [phone]  [facility] [place] Triage:  [phone]    Call triage with chills and/or temperature greater than or equal to 100.5, uncontrolled nausea/vomiting, diarrhea, constipation, dizziness, shortness of breath, chest pain, bleeding, unexplained bruising, or any new/concerning symptoms, questions/concerns.     If you are having any concerning symptoms or wish to speak to a provider before your next infusion visit, please call your care coordinator or triage to notify them so we can adequately serve you.       After Hours: [phone]    If after hours, weekends, or holidays, call main hospital  and ask for Oncology doctor on call.             January 2018 Sunday Monday Tuesday Wednesday Thursday Friday Saturday        1     2     3     4     5     6       7     8     P MASONIC LAB DRAW   11:30 AM   (15 min.)    MASONIC LAB DRAW   [facility] Masonic Lab Draw     UMP RETURN   11:45 AM   (30 min.)   Kadi Dee MD   [facility]     UMP ONC INFUSION 240    1:00 PM   (240 min.)    ONCOLOGY INFUSION   [facility] 9     10     11     12     13       14     15     UMP RETURN    8:45 AM   (50 min.)   [facility]ow [facility]    8:45 AM   (15 min.)    MASONIC LAB DRAW   [facility] Masonic Lab Draw     UMP ONC INFUSION 120    9:30 AM   (120 min.)    ONCOLOGY INFUSION   [facility] 16     17     18     19     20       21     22     UMP MASONIC LAB DRAW    8:45 AM   (15 min.)     MASONIC LAB DRAW   [facility] Masonic Lab Draw     UMP RETURN    9:05 AM   (50 min.)   Loraine Sutherland PA-C [facility]     UMP ONC INFUSION 180   10:30 AM   (180 min.)    ONCOLOGY INFUSION   [facility] 23     24     25     26     27       28     29     30     31 February 2018 Sunday Monday Tuesday Wednesday Thursday Friday Saturday                       1     2     3       4     5     UMP MASONIC LAB DRAW    9:30 AM   (15 min.)    MASONIC LAB DRAW   [facility] Masonic Lab Draw     UMP RETURN    9:55 AM   (50 min.)   Loraine Sutherland PA-C   [facility]     UMP ONC INFUSION 180   11:00 AM   (180 min.)    ONCOLOGY INFUSION   [facility] 6     7     8     9     10       11     12     UMP MASONIC LAB DRAW    8:00 AM   (15 min.)    MASONIC LAB DRAW   [facility] Masonic Lab Draw     UMP ONC INFUSION 120    8:30 AM   (120 min.)    ONCOLOGY INFUSION   [facility] 13     14     15     16     17       18     19     UMP MASONIC LAB DRAW    7:30 AM   (15 min.)    MASONIC LAB DRAW   [facility] Masonic Lab Draw     UMP ONC INFUSION 180    8:00 AM   (180 min.)    ONCOLOGY INFUSION   [facility] 20     21     22     23     24       25     26     27     28                                Recent Results (from the past 24 hour(s))   CBC with platelets differential    Collection Time: 01/15/18  7:42 AM   Result Value Ref Range    WBC 3.9 (L) 4.0 - 11.0 10e9/L    RBC Count 4.47 4.4 - 5.9 10e12/L    Hemoglobin 13.8 13.3 - 17.7 g/dL    Hematocrit 41.3 40.0 - 53.0 %    MCV 92 78 - 100 fl    MCH 30.9 26.5 - 33.0 pg    MCHC 33.4 31.5 - 36.5 g/dL    RDW 12.9 10.0 - 15.0 %    Platelet Count 151 150 - 450 10e9/L    Diff Method Automated Method     % Neutrophils 62.4 %    % Lymphocytes 31.8 %    % Monocytes 4.4 %    % Eosinophils 0.3 %    % Basophils 0.3 %    % Immature Granulocytes 0.8  %    Nucleated RBCs 0 0 /100    Absolute Neutrophil 2.4 1.6 - 8.3 10e9/L    Absolute Lymphocytes 1.2 0.8 - 5.3 10e9/L    Absolute Monocytes 0.2 0.0 - 1.3 10e9/L    Absolute Eosinophils 0.0 0.0 - 0.7 10e9/L    Absolute Basophils 0.0 0.0 - 0.2 10e9/L    Abs Immature Granulocytes 0.0 0 - 0.4 10e9/L    Absolute Nucleated RBC 0.0    EKG 12-lead, complete    Collection Time: 01/15/18  9:10 AM   Result Value Ref Range    Interpretation ECG Click View Image link to view waveform and result    Basic metabolic panel    Collection Time: 01/15/18  9:20 AM   Result Value Ref Range    Sodium 141 133 - 144 mmol/L    Potassium 3.6 3.4 - 5.3 mmol/L    Chloride 110 (H) 94 - 109 mmol/L    Carbon Dioxide 24 20 - 32 mmol/L    Anion Gap 7 3 - 14 mmol/L    Glucose 118 (H) 70 - 99 mg/dL    Urea Nitrogen 16 7 - 30 mg/dL    Creatinine 0.80 0.66 - 1.25 mg/dL    GFR Estimate >90 >60 mL/min/1.7m2    GFR Estimate If Black >90 >60 mL/min/1.7m2    Calcium 8.4 (L) 8.5 - 10.1 mg/dL   Magnesium    Collection Time: 01/15/18  9:20 AM   Result Value Ref Range    Magnesium 1.7 1.6 - 2.3 mg/dL   Troponin I    Collection Time: 01/15/18  9:20 AM   Result Value Ref Range    Troponin I ES <0.015 0.000 - 0.045 ug/L               Follow-ups after your visit        Your next 10 appointments already scheduled     Jan 22, 2018  8:45 AM CST   Masonic Lab Draw with  4th aspect LAB DRAW   [facility] Lab Draw ([facility])    [address]  [address]  [facility] [id number] [phone]            Jan 22, 2018  9:20 AM CST   (Arrive by 9:05 AM)   Return Visit with Loraine Sutherland PA-C   [facility] ([facility])    [address]  [address]  [facility] [id number] [phone]            Jan 22, 2018 10:30 AM CST   Infusion 180 with  ONCOLOGY INFUSION,  16 [facility] ([facility])    [address]  [address]  [facility] [address]   [phone]            Feb 05, 2018  9:30 AM CST   Masonic Lab Draw with UC MASONIC LAB DRAW   [facility]    [address] [place] [address]  [address]  [facility] [address]   [phone]            Feb 05, 2018 10:10 AM CST   (Arrive by 9:55 AM)   Return Visit with Loraine Sutherland PA-C   [facility] ([facility])    [address] [place] [address]  [address]  [facility] [address]   [phone]            Feb 05, 2018 11:00 AM CST   Infusion 180 with UC ONCOLOGY INFUSION, UC 24 ATC   [facility] ([facility])    [address] [place] [address]  [address]  [facility] [address]   [phone]            Feb 12, 2018  8:00 AM CST   Masonic Lab Draw with UC MASONIC LAB DRAW   [facility])    [address] [place] [address]  [address]  [facility] [address]   [phone]            Feb 12, 2018  8:30 AM CST   Infusion 120 with UC ONCOLOGY INFUSION   [facility] ([facility])    [address] [place] [address]  [address]  [facility] [address]   [phone]              Who to contact     If you have questions or need follow up information about today's clinic visit or your schedule please contact [facility] directly at [phone].  Normal or non-critical lab and imaging results will be communicated to you by MyChart, letter or phone within 4 business days after the clinic has received the results. If you do not hear from us within 7 days, please contact the clinic through MyChart or phone. If you have a critical or abnormal lab result, we will notify you by phone as soon as possible.  Submit refill requests through [email or web address] or call your pharmacy and they will forward the refill request to us. Please allow 3 business days for your refill to be completed.           Additional Information About Your Visit        SHEEXhart Information     TimePoints gives you secure access to your electronic health record. If you see a primary care provider, you can also send messages to your care team and make appointments. If you have questions, please call your primary care clinic.  If you do not have a primary care provider, please call [phone] and they will assist you.        Care EveryWhere ID     This is your Care EveryWhere ID. This could be used by other organizations to access your [place] medical records  [id number]        Your Vitals Were     Pulse Temperature Respirations Pulse Oximetry          83 98.1  F (36.7  C) (Oral) 18 96%         Blood Pressure from Last 3 Encounters:   01/15/18 95/61   01/08/18 117/82   12/27/17 120/78    Weight from Last 3 Encounters:   01/08/18 (!) 163.3 kg (360 lb)   12/27/17 (!) 158.8 kg (350 lb)   12/12/17 (!) 159.2 kg (351 lb)              We Performed the Following     Basic metabolic panel     CBC with platelets differential     Magnesium     Troponin I        Primary Care Provider Fax #    Physician No Ref-Primary [phone]       No address on file        Equal Access to Services     CARLITOS [facility] : Hadii aad hunter canseco Sonigel, waaxda luqadaha, qaybta kaalmada adelaurynyada, geraldo [place] . So [facility] [phone].    ATENCIÓN: Si habla español, tiene a alamo disposición servicios gratuitos de asistencia lingüística. Llame al [phone].    We comply with applicable federal civil rights laws and [place] laws. We do not discriminate on the basis of race, color, national origin, age, disability, sex, sexual orientation, or gender identity.            Thank you!     Thank you for choosing [facility]  for your care. Our goal is always to provide you with excellent care. Hearing back from our patients is one way we can continue to improve our services. Please take a few minutes to complete the written  survey that you may receive in the mail after your visit with us. Thank you!             Your Updated Medication List - Protect others around you: Learn how to safely use, store and throw away your medicines at www.disposemymeds.org.          This list is accurate as of: 1/15/18 10:56 AM.  Always use your most recent med list.                   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)       LORazepam 0.5 MG tablet    ATIVAN    30 tablet    Take 1 tablet (0.5 mg) by mouth every 4 hours as needed (Anxiety, Nausea/Vomiting or Sleep)    Cancer of distal third of esophagus (H)       Multi-vitamin Tabs tablet      Take 1 tablet by mouth daily        ondansetron 8 MG tablet    ZOFRAN    30 tablet    Take 1 tablet (8 mg) by mouth every 8 hours as needed (Nausea/Vomiting)    Cancer of distal third of esophagus (H)       prochlorperazine 10 MG tablet    COMPAZINE    30 tablet    Take 1 tablet (10 mg) by mouth every 6 hours as needed (Nausea/Vomiting)    Cancer of distal third of esophagus (H)       timolol 0.5 % ophthalmic solution    TIMOPTIC     Place into both eyes daily    Cancer of distal third of esophagus (H)       zolpidem 10 MG tablet    AMBIEN    60 tablet    Take 1 tablet (10 mg) by mouth nightly as needed    Metastasis from gastric cancer (H)

## 2018-01-15 NOTE — NURSING NOTE
Port accessed by RN, pt tolerated well. Labs collected and sent, line flushed with NS and heparin. Vitals taken and pt checked in for next appt.   Anais Rodriguez

## 2018-01-15 NOTE — PROGRESS NOTES
"Infusion Nursing Note:  Reuben Padilla presents today for Cycle 1 Day 8 Taxol.    Patient seen by provider today: no    Treatment Conditions:  Lab Results   Component Value Date    HGB 13.8 01/15/2018     Lab Results   Component Value Date    WBC 3.9 01/15/2018      Lab Results   Component Value Date    ANEU 2.4 01/15/2018     Lab Results   Component Value Date     01/15/2018      Results reviewed, labs MET treatment parameters, ok to proceed with treatment.      Intravenous Access:  Implanted Port.    Note: Pt past out in lab this morning before infusion appt, see RRT note. 1000 ml NS IV started in lab and infusing on arrival.   Arrived to infusion via WC, diaphoretic and pale.  BP: 92/71  HR: 80-90's and irregular. Pt with history of A-Fib and states, \"I can't tell when my heart rate is irregular or not.\"  Denies fever/chills, N/V, cough, SOB.  Pt stated, \"I'm staying with my brother and he does have the flu.\"  Pt reports having breakfast before coming today and eating/drinking at home without problems.  Pt also reports having felt faint with lab draws in the past.      Dhara HILL notified and will come assess pt today.    1/16/18 at 0930: HONORIO HILL / Luzmaria Madison RN  Draw BMP, Mg and Troponin level  EKG    1/16/18 at 1000: HONORIO HILL / Luzmaira Madison RN  EKG reviewed.  OK for pt to get chemotherapy today if electrolytes WNL.  Pt will need Heparin 10a level set up for next appt - Pt aware    BP at time of discharge: 123/84        Post Infusion Assessment:  Patient tolerated Taxol infusion without incident.  Blood return noted pre and post infusion.  Access discontinued per protocol.    Discharge Plan:   Patient declined prescription refills.  Discharge instructions reviewed with: Patient.  Patient and/or family verbalized understanding of discharge instructions and all questions answered.  Copy of AVS reviewed with patient and/or family.  Patient will return " 1/22/18 for next appointment.  Patient discharged in stable condition accompanied by: self.  Departure Mode: Ambulatory.  Face to Face time: 0.    Luzmaria Madison RN

## 2018-01-15 NOTE — MR AVS SNAPSHOT
After Visit Summary   1/15/2018    Reuben Padilla    MRN: [id number]           Patient Information     Date Of Birth          1960        Visit Information        Provider Department      1/15/2018 9:00 AM Amparo Sow PA [facility]        Today's Diagnoses     Syncope, unspecified syncope type    -  1       Follow-ups after your visit        Your next 10 appointments already scheduled     Jan 22, 2018  8:45 AM CST   Masonic Lab Draw with UC MASONIC LAB DRAW   [facility]    [address] [place] [address]  [address]  [facility] [address]   [phone]            Jan 22, 2018  9:20 AM CST   (Arrive by 9:05 AM)   Return Visit with Loraine Sutherland PA-C   [facility] ([facility])    [address] [place] [address]  [address]  [facility] [address]   [phone]            Jan 22, 2018 10:30 AM CST   Infusion 180 with UC ONCOLOGY INFUSION, UC 16 ATC   [facility])    [address] [place] [address]  [address]  [facility] [address]   [phone]            Feb 05, 2018  9:30 AM CST   Masonic Lab Draw with UC MASONIC LAB DRAW   [facility]    [address] [place] [address]  [address]  [facility] [address]   [phone]            Feb 05, 2018 10:10 AM CST   (Arrive by 9:55 AM)   Return Visit with SWAPNIL [facility] ([facility])    [address] [place] [address]  [address]  [facility] [address]   [phone]            Feb 05, 2018 11:00 AM CST   Infusion 180 with UC ONCOLOGY INFUSION, UC 24 ATC   [facility] ([facility])    [address] [place] [address]  [address]  [facility] [address]   [phone]            Feb 12, 2018  8:00 AM CST   Masonic Lab Draw with UC MASONIC LAB DRAW   [facility] Draw ([facility])    [address]  [address]  [facility] [id number] [phone]            Feb 12, 2018  8:30 AM CST   Infusion 120 with UC ONCOLOGY INFUSION   [facility] ([facility])    [address]  [address]  [facility] [address] [phone]              Who to contact     If you have questions or need follow up information about today's clinic visit or your schedule please contact [facility] directly at [phone].  Normal or non-critical lab and imaging results will be communicated to you by SalesPortalhart, letter or phone within 4 business days after the clinic has received the results. If you do not hear from us within 7 days, please contact the clinic through Uniteam Communicationt or phone. If you have a critical or abnormal lab result, we will notify you by phone as soon as possible.  Submit refill requests through hyperWALLET Systems or call your pharmacy and they will forward the refill request to us. Please allow 3 business days for your refill to be completed.          Additional Information About Your Visit        SalesPortalhart Information     [employer]WALLET Systems gives you secure access to your electronic health record. If you see a primary care provider, you can also send messages to your care team and make appointments. If you have questions, please call your primary care clinic.  If you do not have a primary care provider, please call [phone] and they will assist you.        Care EveryWhere ID     This is your Care EveryWhere ID. This could be used by other organizations to access your [place] medical records  [id number]         Blood Pressure from Last 3 Encounters:   01/15/18 123/84   01/08/18 117/82   12/27/17 120/78    Weight from Last 3 Encounters:   01/08/18 (!) 163.3 kg (360 lb)   12/27/17 (!) 158.8 kg (350 lb)   12/12/17 (!) 159.2 kg (351 lb)              We Performed the Following     EKG 12-lead,  complete        Primary Care Provider Fax #    Physician No Ref-Primary [phone]       No address on file        Equal Access to Services     OLLIE SHARPE : Hadii aad ku hadmadison Dunlap, dillonda renaleksandra, nancy sotelo, geraldo johnnyin hayaaotilio dhillonlauryn munson lasantyotilio cardenas. So [facility] [phone].    ATENCIÓN: Si habla español, tiene a alamo disposición servicios gratuitos de asistencia lingüística. Llame al [phone].    We comply with applicable federal civil rights laws and [place] laws. We do not discriminate on the basis of race, color, national origin, age, disability, sex, sexual orientation, or gender identity.            Thank you!     Thank you for choosing [facility]  for your care. Our goal is always to provide you with excellent care. Hearing back from our patients is one way we can continue to improve our services. Please take a few minutes to complete the written survey that you may receive in the mail after your visit with us. Thank you!             Your Updated Medication List - Protect others around you: Learn how to safely use, store and throw away your medicines at www.disposemymeds.org.          This list is accurate as of: 1/15/18  2:03 PM.  Always use your most recent med list.                   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)       LORazepam 0.5 MG tablet    ATIVAN    30 tablet    Take 1 tablet (0.5 mg) by mouth every 4 hours as needed (Anxiety, Nausea/Vomiting or Sleep)    Cancer of distal  third of esophagus (H)       Multi-vitamin Tabs tablet      Take 1 tablet by mouth daily        ondansetron 8 MG tablet    ZOFRAN    30 tablet    Take 1 tablet (8 mg) by mouth every 8 hours as needed (Nausea/Vomiting)    Cancer of distal third of esophagus (H)       prochlorperazine 10 MG tablet    COMPAZINE    30 tablet    Take 1 tablet (10 mg) by mouth every 6 hours as needed (Nausea/Vomiting)    Cancer of distal third of esophagus (H)       timolol 0.5 % ophthalmic solution    TIMOPTIC     Place into both eyes daily    Cancer of distal third of esophagus (H)       zolpidem 10 MG tablet    AMBIEN    60 tablet    Take 1 tablet (10 mg) by mouth nightly as needed    Metastasis from gastric cancer (H)

## 2018-01-15 NOTE — LETTER
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 His tumor is positive for cytokeratin 20, negative for P63 and CK7, and HER2 is negative. He started on FOLFOX (5FU/oxaliplatin) on 5/15/2017. He had a delay in treatment from 9/5-10/2/17 due to work related injury. He had an excellent response by imaging throughout the summer 2017. He had progression of disease by PET/CT in 12/2017.  He underwent biopsy for Foundation One testing which is currently pending.  Plan was changed to Taxol and Cymramza, although Cyramza on hold for 2 weeks due to recent PE. Please see previous notes for further details on the patient's history. He was in lab today prior to infusion appointment when he had syncopal event.    Interval History:  Patient states he had already had port accessed and had just had labs drawn. He got up and was talking to the lab nurse when he started feeling lightheaded. He sat himself down in the chair and then was told later he had passed out for a few seconds. RRT was called. BP was 100/70 and pulse 100 per chart. He states he recalls feeling cold and clammy. He denies hitting his head or any pain anywhere. He states the next thing he remembers was lying back in chair. He states he has had 4 similar syncopal in the past surrounding blood draws. He states he did have breakfast this morning. He denies any nausea, palpitations, chest pain, pleuritic pain, SOB. He has since been up to use the bathroom after being in infusion without any repeat of lightheadedness, even though his SBP now is in the 90s and he usually is in the 110s-120s. He does have history of atrial fibrillation, but he has never  experienced any symptoms. He was seen by cardiology here last August. He was on rivaroxaban at the time, so no additional intervention recommended at the time. He has been on Lovenox 130 mg SC BID since mid-December and he states he has not missed any doses. He usually administers at 9:30-10AM and PM. He has the lovenox with him and has not given it yet to himself this morning.    Review of Systems:  Patient denies any of the following except if noted above: fevers, chills, nausea, vomiting, headaches, numbness, tingling, bleeding or bruising issues.    Current Outpatient Prescriptions   Medication Sig Dispense Refill     LORazepam (ATIVAN) 0.5 MG tablet Take 1 tablet (0.5 mg) by mouth every 4 hours as needed (Anxiety, Nausea/Vomiting or Sleep) (Patient not taking: Reported on 1/15/2018) 30 tablet 2     prochlorperazine (COMPAZINE) 10 MG tablet Take 1 tablet (10 mg) by mouth every 6 hours as needed (Nausea/Vomiting) (Patient not taking: Reported on 1/15/2018) 30 tablet 2     enoxaparin (LOVENOX) 80 MG/0.8ML injection Inject 1.3 mLs (130 mg) Subcutaneous 2 times daily 60 Syringe 3     IBUPROFEN PO Take 600 mg by mouth every 4 hours as needed for moderate pain       [DISCONTINUED] dexamethasone (DECADRON) 4 MG tablet Take two tablets daily on days 2,3 and then 1 tablet daily days 4,5 6 tablet 1     zolpidem (AMBIEN) 10 MG tablet Take 1 tablet (10 mg) by mouth nightly as needed 60 tablet 1     ondansetron (ZOFRAN) 8 MG tablet Take 1 tablet (8 mg) by mouth every 8 hours as needed (Nausea/Vomiting) (Patient not taking: Reported on 11/28/2017) 30 tablet 2     lidocaine-prilocaine (EMLA) cream Apply topically as needed for moderate pain 30 g 3     multivitamin, therapeutic with minerals (MULTI-VITAMIN) TABS tablet Take 1 tablet by mouth daily       fish oil-omega-3 fatty acids 1000 MG capsule Take 2 g by mouth daily       timolol (TIMOPTIC) 0.5 % ophthalmic solution Place into both eyes daily         Physical  Examination:  General: The patient is a pleasant male, seated in infusion chair, in no acute distress.   There were no vitals taken for this visit.  Wt Readings from Last 10 Encounters:   01/08/18 (!) 163.3 kg (360 lb)   12/27/17 (!) 158.8 kg (350 lb)   12/12/17 (!) 159.2 kg (351 lb)   12/11/17 (!) 159.9 kg (352 lb 9.6 oz)   11/28/17 (!) 163.1 kg (359 lb 9.6 oz)   11/13/17 (!) 163.8 kg (361 lb 3.2 oz)   10/30/17 (!) 159.3 kg (351 lb 4.8 oz)   10/17/17 (!) 162.2 kg (357 lb 9.6 oz)   10/02/17 (!) 160.1 kg (353 lb)   09/05/17 (!) 166.1 kg (366 lb 1.6 oz)     HEENT: EOMI, PERRL. Sclerae are anicteric.   Heart: Irregular rate and rhythm.   Lungs: Clear to auscultation bilaterally.   Extremities: Hyperpigmentation of right leg. Right leg a little more swollen than left but patient states this is chronic and unchanged.   Neuro: grossly non-focal  Skin: No rashes, petechiae, or bruising noted on exposed skin.    Laboratory Data:  Results for orders placed or performed in visit on 01/15/18 (from the past 24 hour(s))   EKG 12-lead, complete   Result Value Ref Range    Interpretation ECG Click View Image link to view waveform and result      Results for KORI CHANEY (MRN [id number]) as of 1/15/2018 10:34   1/15/2018 09:20   Sodium 141   Potassium 3.6   Chloride 110 (H)   Carbon Dioxide 24   Urea Nitrogen 16   Creatinine 0.80   GFR Estimate >90   GFR Estimate If Black >90   Calcium 8.4 (L)   Anion Gap 7   Magnesium 1.7   Troponin I ES <0.015   Glucose 118 (H)     Results for KORI CHANEY (MRN [id number]) as of 1/15/2018 10:34   1/15/2018 07:42   WBC 3.9 (L)   Hemoglobin 13.8   Hematocrit 41.3   Platelet Count 151   RBC Count 4.47   MCV 92   MCH 30.9   MCHC 33.4   RDW 12.9   Diff Method Automated Method   % Neutrophils 62.4   % Lymphocytes 31.8   % Monocytes 4.4   % Eosinophils 0.3   % Basophils 0.3   % Immature Granulocytes 0.8   Nucleated RBCs 0   Absolute Neutrophil 2.4   Absolute Lymphocytes 1.2   Absolute  Monocytes 0.2   Absolute Eosinophils 0.0   Absolute Basophils 0.0   Abs Immature Granulocytes 0.0   Absolute Nucleated RBC 0.0     Assessment and Plan:    Syncope. He has several possible etiologies. EKG in infusion with atrial fibrillation, rate 92. His heart rate has fluctuated from 64 to 102 this visit on repeat VS check, but does not appear to be in RVR. His normal SBP is in 110s-120s, but he appears to be tolerating SBP in 90s in infusion without symptoms, even when up and ambulatory. He also had Chest CT on 12/14/17 with PE and extensive clot burden. However, he has been taking lovenox as directed, hep 10a level in December was 1.02 at this dose, and he is not hypoxic. He tells me he has had 4 previous episodes of syncope or near syncope that are similar, always surrounding blood draws. BMP checked and electrolytes WNL. Trop negative. Most likely this is vaso-vagal in etiology.     Metastatic esophogeal cancer.  He had progression of disease by PET/CT in 12/2017.  He underwent biopsy for Foundation One testing which is currently pending.  Plan was changed to Taxol and Cymramza, although Cyramza on hold for 2 weeks due to recent PE. He is scheduled for C1D1 Taxol today. Spoke with Dr. Dee. Ok to proceed with taxol today as scheduled.    Atrial fibrillation: Evaluated by cardiology on 8/21/17. They recommended discontinuing ASA as he was on rivaroxaban at the time for PE. He was supposed to follow up in 3 months, but it does not appear this occurred. His HR is mostly in 80s-90s. Range today has been . He appears to be asymptomatic when seen in infusion center.  --Will refer to cardiology if becomes symptomatic     H/o PE: This was originally identified on staging in 07/2017.  He was on Xarelto.  At his restaging evaluation he had progressive symptoms with more clot burden and was started on Lovenox 1 mg/kg twice daily. Hep 10a was 1.02 on 12/26/17. Dr. Dee would like repeat hep 10a level checked. As  patient had not yet administered his AM lovenox and was unwilling to stay 4 hours for a lab draw, will attempt to get 10a next visit on 1/22.  --spoke with infusion RN and asked her to tell patient to change lovenox to 8am/8pm schedule. Will order 10a level for noon on 1/22.      Amparo Sow PA-C  [facility] [address] [address] [phone]

## 2018-01-16 LAB — INTERPRETATION ECG - MUSE: NORMAL

## 2018-01-16 ASSESSMENT — ENCOUNTER SYMPTOMS
ABDOMINAL PAIN: 0
JOINT SWELLING: 0
BACK PAIN: 0
CHILLS: 0
MUSCLE WEAKNESS: 1
ORTHOPNEA: 0
INCREASED ENERGY: 1
TREMORS: 0
DECREASED APPETITE: 0
VOMITING: 0
STIFFNESS: 0
EXERCISE INTOLERANCE: 1
DIZZINESS: 1
LIGHT-HEADEDNESS: 1
SPEECH CHANGE: 0
BLOATING: 0
MYALGIAS: 1
NUMBNESS: 1
WEIGHT LOSS: 0
ALTERED TEMPERATURE REGULATION: 1
PARALYSIS: 0
NERVOUS/ANXIOUS: 0
POLYPHAGIA: 0
DEPRESSION: 0
PALPITATIONS: 0
FATIGUE: 1
HYPOTENSION: 1
HALLUCINATIONS: 0
SYNCOPE: 0
HEADACHES: 0
HEARTBURN: 0
ARTHRALGIAS: 0
WEIGHT GAIN: 0
DISTURBANCES IN COORDINATION: 1
SEIZURES: 0
INSOMNIA: 1
MEMORY LOSS: 0
DECREASED CONCENTRATION: 0
DIARRHEA: 1
LOSS OF CONSCIOUSNESS: 0
SLEEP DISTURBANCES DUE TO BREATHING: 0
NAUSEA: 0
NIGHT SWEATS: 0
JAUNDICE: 0
WEAKNESS: 1
FEVER: 0
TINGLING: 0
POLYDIPSIA: 0
PANIC: 0
CONSTIPATION: 1
BLOOD IN STOOL: 0
BOWEL INCONTINENCE: 0
NECK PAIN: 0
LEG PAIN: 0
HYPERTENSION: 0
RECTAL PAIN: 0
MUSCLE CRAMPS: 0

## 2018-01-22 ENCOUNTER — ONCOLOGY VISIT (OUTPATIENT)
Dept: ONCOLOGY | Facility: CLINIC | Age: 58
End: 2018-01-22
Attending: INTERNAL MEDICINE
Payer: COMMERCIAL

## 2018-01-22 ENCOUNTER — TELEPHONE (OUTPATIENT)
Dept: ONCOLOGY | Facility: CLINIC | Age: 58
End: 2018-01-22

## 2018-01-22 ENCOUNTER — APPOINTMENT (OUTPATIENT)
Dept: LAB | Facility: CLINIC | Age: 58
End: 2018-01-22
Attending: PHYSICIAN ASSISTANT
Payer: COMMERCIAL

## 2018-01-22 VITALS
WEIGHT: 315 LBS | SYSTOLIC BLOOD PRESSURE: 111 MMHG | OXYGEN SATURATION: 97 % | BODY MASS INDEX: 41.1 KG/M2 | RESPIRATION RATE: 18 BRPM | DIASTOLIC BLOOD PRESSURE: 75 MMHG | HEART RATE: 99 BPM | TEMPERATURE: 97.2 F

## 2018-01-22 DIAGNOSIS — I26.99 OTHER ACUTE PULMONARY EMBOLISM WITHOUT ACUTE COR PULMONALE (H): ICD-10-CM

## 2018-01-22 DIAGNOSIS — C15.5 CANCER OF DISTAL THIRD OF ESOPHAGUS (H): Primary | ICD-10-CM

## 2018-01-22 DIAGNOSIS — I26.99 OTHER ACUTE PULMONARY EMBOLISM WITHOUT ACUTE COR PULMONALE (H): Primary | ICD-10-CM

## 2018-01-22 LAB
ALBUMIN SERPL-MCNC: 3.5 G/DL (ref 3.4–5)
ALBUMIN UR-MCNC: 10 MG/DL
ALP SERPL-CCNC: 79 U/L (ref 40–150)
ALT SERPL W P-5'-P-CCNC: 33 U/L (ref 0–70)
ANION GAP SERPL CALCULATED.3IONS-SCNC: 6 MMOL/L (ref 3–14)
AST SERPL W P-5'-P-CCNC: 26 U/L (ref 0–45)
BASOPHILS # BLD AUTO: 0 10E9/L (ref 0–0.2)
BASOPHILS NFR BLD AUTO: 0.4 %
BILIRUB SERPL-MCNC: 0.4 MG/DL (ref 0.2–1.3)
BUN SERPL-MCNC: 13 MG/DL (ref 7–30)
CALCIUM SERPL-MCNC: 8.7 MG/DL (ref 8.5–10.1)
CHLORIDE SERPL-SCNC: 108 MMOL/L (ref 94–109)
CO2 SERPL-SCNC: 25 MMOL/L (ref 20–32)
CREAT SERPL-MCNC: 0.85 MG/DL (ref 0.66–1.25)
DIFFERENTIAL METHOD BLD: ABNORMAL
EOSINOPHIL # BLD AUTO: 0 10E9/L (ref 0–0.7)
EOSINOPHIL NFR BLD AUTO: 0.4 %
ERYTHROCYTE [DISTWIDTH] IN BLOOD BY AUTOMATED COUNT: 13.3 % (ref 10–15)
GFR SERPL CREATININE-BSD FRML MDRD: >90 ML/MIN/1.7M2
GLUCOSE SERPL-MCNC: 97 MG/DL (ref 70–99)
HCT VFR BLD AUTO: 39 % (ref 40–53)
HGB BLD-MCNC: 13 G/DL (ref 13.3–17.7)
IMM GRANULOCYTES # BLD: 0 10E9/L (ref 0–0.4)
IMM GRANULOCYTES NFR BLD: 0.4 %
LYMPHOCYTES # BLD AUTO: 1.3 10E9/L (ref 0.8–5.3)
LYMPHOCYTES NFR BLD AUTO: 51.7 %
MCH RBC QN AUTO: 31.1 PG (ref 26.5–33)
MCHC RBC AUTO-ENTMCNC: 33.3 G/DL (ref 31.5–36.5)
MCV RBC AUTO: 93 FL (ref 78–100)
MONOCYTES # BLD AUTO: 0.2 10E9/L (ref 0–1.3)
MONOCYTES NFR BLD AUTO: 6.6 %
NEUTROPHILS # BLD AUTO: 1.1 10E9/L (ref 1.6–8.3)
NEUTROPHILS NFR BLD AUTO: 40.5 %
NRBC # BLD AUTO: 0 10*3/UL
NRBC BLD AUTO-RTO: 0 /100
PLATELET # BLD AUTO: 172 10E9/L (ref 150–450)
POTASSIUM SERPL-SCNC: 4.1 MMOL/L (ref 3.4–5.3)
PROT SERPL-MCNC: 7.4 G/DL (ref 6.8–8.8)
RBC # BLD AUTO: 4.18 10E12/L (ref 4.4–5.9)
SODIUM SERPL-SCNC: 139 MMOL/L (ref 133–144)
WBC # BLD AUTO: 2.6 10E9/L (ref 4–11)

## 2018-01-22 PROCEDURE — 25000125 ZZHC RX 250: Mod: ZF | Performed by: INTERNAL MEDICINE

## 2018-01-22 PROCEDURE — 80053 COMPREHEN METABOLIC PANEL: CPT | Performed by: PHYSICIAN ASSISTANT

## 2018-01-22 PROCEDURE — G0463 HOSPITAL OUTPT CLINIC VISIT: HCPCS | Mod: ZF

## 2018-01-22 PROCEDURE — 99214 OFFICE O/P EST MOD 30 MIN: CPT | Mod: ZP | Performed by: PHYSICIAN ASSISTANT

## 2018-01-22 PROCEDURE — 96375 TX/PRO/DX INJ NEW DRUG ADDON: CPT

## 2018-01-22 PROCEDURE — 25000128 H RX IP 250 OP 636: Mod: ZF | Performed by: PHYSICIAN ASSISTANT

## 2018-01-22 PROCEDURE — 25000132 ZZH RX MED GY IP 250 OP 250 PS 637: Mod: ZF | Performed by: INTERNAL MEDICINE

## 2018-01-22 PROCEDURE — 96417 CHEMO IV INFUS EACH ADDL SEQ: CPT

## 2018-01-22 PROCEDURE — 85025 COMPLETE CBC W/AUTO DIFF WBC: CPT | Performed by: INTERNAL MEDICINE

## 2018-01-22 PROCEDURE — 85520 HEPARIN ASSAY: CPT | Performed by: INTERNAL MEDICINE

## 2018-01-22 PROCEDURE — 96413 CHEMO IV INFUSION 1 HR: CPT

## 2018-01-22 PROCEDURE — 81003 URINALYSIS AUTO W/O SCOPE: CPT | Performed by: INTERNAL MEDICINE

## 2018-01-22 PROCEDURE — 25000128 H RX IP 250 OP 636: Mod: ZF | Performed by: INTERNAL MEDICINE

## 2018-01-22 RX ORDER — DIPHENHYDRAMINE HCL 25 MG
50 CAPSULE ORAL ONCE
Status: COMPLETED | OUTPATIENT
Start: 2018-01-22 | End: 2018-01-22

## 2018-01-22 RX ORDER — HEPARIN SODIUM (PORCINE) LOCK FLUSH IV SOLN 100 UNIT/ML 100 UNIT/ML
5 SOLUTION INTRAVENOUS ONCE
Status: COMPLETED | OUTPATIENT
Start: 2018-01-22 | End: 2018-01-22

## 2018-01-22 RX ORDER — HEPARIN SODIUM (PORCINE) LOCK FLUSH IV SOLN 100 UNIT/ML 100 UNIT/ML
500 SOLUTION INTRAVENOUS ONCE
Status: COMPLETED | OUTPATIENT
Start: 2018-01-22 | End: 2018-01-22

## 2018-01-22 RX ADMIN — SODIUM CHLORIDE 1300 MG: 9 INJECTION, SOLUTION INTRAVENOUS at 13:50

## 2018-01-22 RX ADMIN — SODIUM CHLORIDE, PRESERVATIVE FREE 5 ML: 5 INJECTION INTRAVENOUS at 09:26

## 2018-01-22 RX ADMIN — DIPHENHYDRAMINE HYDROCHLORIDE 50 MG: 25 CAPSULE ORAL at 13:21

## 2018-01-22 RX ADMIN — FAMOTIDINE 20 MG: 20 INJECTION, SOLUTION INTRAVENOUS at 13:24

## 2018-01-22 RX ADMIN — DEXAMETHASONE SODIUM PHOSPHATE 20 MG: 10 INJECTION, SOLUTION INTRAMUSCULAR; INTRAVENOUS at 13:47

## 2018-01-22 RX ADMIN — SODIUM CHLORIDE 250 ML: 9 INJECTION, SOLUTION INTRAVENOUS at 13:21

## 2018-01-22 RX ADMIN — SODIUM CHLORIDE, PRESERVATIVE FREE 500 UNITS: 5 INJECTION INTRAVENOUS at 16:01

## 2018-01-22 RX ADMIN — PACLITAXEL 240 MG: 6 INJECTION, SOLUTION INTRAVENOUS at 14:56

## 2018-01-22 ASSESSMENT — PAIN SCALES - GENERAL: PAINLEVEL: NO PAIN (0)

## 2018-01-22 NOTE — PATIENT INSTRUCTIONS
Contact Numbers    [facility] Main Line: [phone]  [facility] Triage:  [phone]    Call triage with chills and/or temperature greater than or equal to 100.5, uncontrolled nausea/vomiting, diarrhea, constipation, dizziness, shortness of breath, chest pain, bleeding, unexplained bruising, or any new/concerning symptoms, questions/concerns.     If you are having any concerning symptoms or wish to speak to a provider before your next infusion visit, please call your care coordinator or triage to notify them so we can adequately serve you.       After Hours: [phone]    If after hours, weekends, or holidays, call main hospital  and ask for Oncology doctor on call.           January 2018 Sunday Monday Tuesday Wednesday Thursday Friday Saturday        1     2     3     4     5     6       7     8     [facility] DRAW   11:30 AM   (15 min.)   [facility]     [facility] RETURN   11:45 AM   (30 min.)   Kadi Dee MD   [facility]     [facility] ONC INFUSION 240    1:00 PM   (240 min.)   [facility] ONCOLOGY INFUSION   [facility] 9     10     11     12     13       14     15     [facility] RETURN    8:45 AM   (50 min.)   Amparo Sow PA   [facility]     [facility] DRAW    8:45 AM   (15 min.)   [facility]   [facility]     [facility] ONC INFUSION 120    9:30 AM   (120 min.)    ONCOLOGY INFUSION   [facility] 16     17     18     19     20       21     22     [facility] DRAW    8:45 AM   (15 min.)   [facility] DRAW   [facility] Lab Draw     [facility] RETURN    9:05 AM   (50 min.)   Loraine Sutherland PADestinyC   [facility]     [facility] ONC INFUSION 180   10:30 AM   (180 min.)    ONCOLOGY INFUSION   [facility] 23     24     25     26     27       28     29     30     31 February 2018 Sunday Monday Tuesday Wednesday Thursday  Friday Saturday                       1     2     3       4     5     [facility] LAB DRAW    9:30 AM   (15 min.)    [facility] LAB DRAW   [facility] Lab Draw     [facility] RETURN    9:55 AM   (50 min.)   Loraine Sutherland PA-C   [facility]     [facility] ONC INFUSION 180   11:00 AM   (180 min.)   [facility] ONCOLOGY INFUSION   [facility] 6     7     8     9     10       11     12     [facility] DRAW    8:00 AM   (15 min.)    [facility]     [facility] ONC INFUSION 120    8:30 AM   (120 min.)    ONCOLOGY INFUSION   [facility] 13     14     15     16     17       18     19     [facility] LAB DRAW   11:15 AM   (15 min.)    [facility]RAW   [facility]     [facility] RETURN   11:35 AM   (50 min.)   Loraine Sutherland PA-C [facility]     [facility] ONC INFUSION 180   12:30 PM   (180 min.)    ONCOLOGY INFUSION   [facility] 20     21     22     23     24       25     26     27     28                                 Lab Results:  Recent Results (from the past 12 hour(s))   CBC with platelets differential    Collection Time: 01/22/18  9:32 AM   Result Value Ref Range    WBC 2.6 (L) 4.0 - 11.0 10e9/L    RBC Count 4.18 (L) 4.4 - 5.9 10e12/L    Hemoglobin 13.0 (L) 13.3 - 17.7 g/dL    Hematocrit 39.0 (L) 40.0 - 53.0 %    MCV 93 78 - 100 fl    MCH 31.1 26.5 - 33.0 pg    MCHC 33.3 31.5 - 36.5 g/dL    RDW 13.3 10.0 - 15.0 %    Platelet Count 172 150 - 450 10e9/L    Diff Method Automated Method     % Neutrophils 40.5 %    % Lymphocytes 51.7 %    % Monocytes 6.6 %    % Eosinophils 0.4 %    % Basophils 0.4 %    % Immature Granulocytes 0.4 %    Nucleated RBCs 0 0 /100    Absolute Neutrophil 1.1 (L) 1.6 - 8.3 10e9/L    Absolute Lymphocytes 1.3 0.8 - 5.3 10e9/L    Absolute Monocytes 0.2 0.0 - 1.3 10e9/L    Absolute Eosinophils 0.0 0.0 - 0.7 10e9/L    Absolute Basophils 0.0 0.0 - 0.2 10e9/L    Abs Immature  Granulocytes 0.0 0 - 0.4 10e9/L    Absolute Nucleated RBC 0.0    Protein qualitative urine    Collection Time: 01/22/18 11:50 AM   Result Value Ref Range    Protein Albumin Urine 10 (A) NEG^Negative mg/dL   Comprehensive metabolic panel    Collection Time: 01/22/18 12:24 PM   Result Value Ref Range    Sodium 139 133 - 144 mmol/L    Potassium 4.1 3.4 - 5.3 mmol/L    Chloride 108 94 - 109 mmol/L    Carbon Dioxide 25 20 - 32 mmol/L    Anion Gap 6 3 - 14 mmol/L    Glucose 97 70 - 99 mg/dL    Urea Nitrogen 13 7 - 30 mg/dL    Creatinine 0.85 0.66 - 1.25 mg/dL    GFR Estimate >90 >60 mL/min/1.7m2    GFR Estimate If Black >90 >60 mL/min/1.7m2    Calcium 8.7 8.5 - 10.1 mg/dL    Bilirubin Total 0.4 0.2 - 1.3 mg/dL    Albumin 3.5 3.4 - 5.0 g/dL    Protein Total 7.4 6.8 - 8.8 g/dL    Alkaline Phosphatase 79 40 - 150 U/L    ALT 33 0 - 70 U/L    AST 26 0 - 45 U/L

## 2018-01-22 NOTE — NURSING NOTE
"Chief Complaint   Patient presents with     Oncology Clinic Visit     Return for Esophageal Ca      Oncology Clinic Visit     Return for Esophageal Ca , Tx     Port Draw     Labs drawn from port by RN. Line flushed with saline and heparin. Please drawn Hep 10a level in infusion - needs to be ordered.     Port accessed with 20g 3/4\" gripper needle by RN, labs collected, line flushed with saline and heparin.    Carley Phipps RN  "

## 2018-01-22 NOTE — MR AVS SNAPSHOT
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                  Your next 10 appointments already scheduled     Feb 05, 2018  9:30 AM CST   [facility] Draw with [facility] DRAW   [facility]    [address]  [address]  [facility] [address]   [phone]            Feb 05, 2018 10:10 AM CST   (Arrive by 9:55 AM)   Return Visit with Loraine Sutherland PA-C   [facility] ([facility])    [address] [place] [address]  [address]  [facility] [address]   [phone]            Feb 05, 2018 11:00 AM CST   Infusion 180 with UC ONCOLOGY INFUSION, UC 24 ATC   [facility] ([facility])    [address] [place] [address]  [address]  [facility] [address]   [phone]            Feb 12, 2018  8:00 AM CST   [facility] Lab Draw with [facility] DRAW   [facility] Lab Draw ([facility])    [address] [place] [address]  [address]  [facility] [address]   [phone]            Feb 12, 2018  8:30 AM CST   Infusion 120 with UC ONCOLOGY INFUSION, UC 19 ATC   [facility] ([facility])    [address] [place] [address]  [address]  [facility] [address]   [phone]            Feb 19, 2018 11:15 AM CST   [facility] Lab Draw with [facility] DRAW   [facility])    [address] [place] [address]  [address]  [facility] [address]    [phone]            Feb 19, 2018 11:50 AM CST   (Arrive by 11:35 AM)   Return Visit with Loraine Sutherland PA-C   [facility] ([facility])    [address] [place] [address]  [address]  [facility] [address] [phone]            Feb 19, 2018 12:30 PM CST   Infusion 180 with UC ONCOLOGY INFUSION   [facility] ([facility])    [address] [place] [address]  [address]  [facility] [address] [phone]              Who to contact     If you have questions or need follow up information about today's clinic visit or your schedule please contact [facility] directly at [phone].  Normal or non-critical lab and imaging results will be communicated to you by [facility], letter or phone within 4 business days after the clinic has received the results. If you do not hear from us within 7 days, please contact the clinic through [facility] [employer] or phone. If you have a critical or abnormal lab result, we will notify you by phone as soon as possible.  Submit refill requests through [facility] or call your pharmacy and they will forward the refill request to us. Please allow 3 business days for your refill to be completed.          Additional Information About Your Visit        [facility] Information     [facility] gives you secure access to your electronic health record. If you see a primary care provider, you can also send messages to your care team and make appointments. If you have questions, please call your primary care clinic.  If you do not have a primary care provider, please call [phone] and they will assist you.        Care EveryWhere ID     This is your Care EveryWhere ID. This could be used by other organizations to access your [place] medical records  [id number]        Your Vitals Were     Pulse Temperature Respirations Pulse Oximetry BMI (Body Mass Index)       99 97.2  F (36.2  C) (Oral) 18 97% 41.1 kg/m2         Blood Pressure from Last 3 Encounters:   01/22/18 111/75   01/15/18 123/84   01/08/18 117/82    Weight from Last 3 Encounters:   01/22/18 (!) 161.3 kg (355 lb 9.6 oz)   01/08/18 (!) 163.3 kg (360 lb)   12/27/17 (!) 158.8 kg (350 lb)              We Performed the Following     Comprehensive metabolic panel     PALLIATIVE CARE REFERRAL        Primary Care Provider Fax #    Physician No Ref-Primary [phone]       No address on file        Equal Access to Services     [facility] : Hadii antonio canseco Sonigel, waaxda luqadaha, qaybta kaalmajuancarlos sotelo, geraldo [place] . So [facility] [place] [phone].    ATENCIÓN: Si habla español, tiene a alamo disposición servicios gratuitos de asistencia lingüística. Llame al [phone].    We comply with applicable federal civil rights laws and [place] laws. We do not discriminate on the basis of race, color, national origin, age, disability, sex, sexual orientation, or gender identity.            Thank you!     Thank you for choosing [facility]  for your care. Our goal is always to provide you with excellent care. Hearing back from our patients is one way we can continue to improve our services. Please take a few minutes to complete the written survey that you may receive in the mail after your visit with us. Thank you!             Your Updated Medication List - Protect others around you: Learn how to safely use, store and throw away your medicines at www.disposemymeds.org.          This list is accurate as of 1/22/18 11:59 PM.  Always use your most recent med list.                   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)       fish oil-omega-3 fatty acids 1000 MG capsule      Take 2 g by mouth daily        IBUPROFEN PO      Take 600 mg by mouth every 4 hours as needed  for moderate pain        lidocaine-prilocaine cream    EMLA    30 g    Apply topically as needed for moderate pain    Cancer of distal third of esophagus (H)       LORazepam 0.5 MG tablet    ATIVAN    30 tablet    Take 1 tablet (0.5 mg) by mouth every 4 hours as needed (Anxiety, Nausea/Vomiting or Sleep)    Cancer of distal third of esophagus (H)       Multi-vitamin Tabs tablet      Take 1 tablet by mouth daily        ondansetron 8 MG tablet    ZOFRAN    30 tablet    Take 1 tablet (8 mg) by mouth every 8 hours as needed (Nausea/Vomiting)    Cancer of distal third of esophagus (H)       prochlorperazine 10 MG tablet    COMPAZINE    30 tablet    Take 1 tablet (10 mg) by mouth every 6 hours as needed (Nausea/Vomiting)    Cancer of distal third of esophagus (H)       timolol 0.5 % ophthalmic solution    TIMOPTIC     Place into both eyes daily    Cancer of distal third of esophagus (H)       zolpidem 10 MG tablet    AMBIEN    60 tablet    Take 1 tablet (10 mg) by mouth nightly as needed    Metastasis from gastric cancer (H)

## 2018-01-22 NOTE — NURSING NOTE
"Oncology Rooming Note    January 22, 2018 9:09 AM   Reuben Padilla is a 57 year old male who presents for:    Chief Complaint   Patient presents with     Oncology Clinic Visit     Return for Esophageal Ca      Oncology Clinic Visit     Return for Esophageal Ca , Tx     Initial Vitals: /75  Pulse 99  Temp 97.2  F (36.2  C) (Oral)  Resp 18  Wt (!) 161.3 kg (355 lb 9.6 oz)  SpO2 97%  BMI 41.1 kg/m2 Estimated body mass index is 41.1 kg/(m^2) as calculated from the following:    Height as of 1/8/18: 1.981 m (6' 5.99\").    Weight as of this encounter: 161.3 kg (355 lb 9.6 oz). Body surface area is 2.98 meters squared.  No Pain (0) Comment: Data Unavailable   No LMP for male patient.  Allergies reviewed: Yes  Medications reviewed: Yes    Medications: Medication refills not needed today.  Pharmacy name entered into [facility]:    [id number] - [place] - [address] [place] [address] AT INTERSECTION [address] [place] [address] PHARMACY [place], [place] - [address] [place] [address] [phone]  [id number] - [place], [place] - [address] [place] [address]    Clinical concerns: questions  Nicole was notified.    8  minutes for nursing intake (face to face time)     Jess Loredo MA              "

## 2018-01-22 NOTE — TELEPHONE ENCOUNTER
Per Patient's request,  completed and faxed [employer] request for lodging dates 2/4/2018-2/6/2018, 2/11-2/13, 2/18-2/20. Caregiver exemption letter also completed and sent. [employer] will contact Patient for confirmation of reservation.  will continue to provide support as needed.    Soo Yeon Han, [facility]  Pager:  [phone]

## 2018-01-22 NOTE — LETTER
1/22/2018     RE: Reuben Padilla  [address] [place] [id number]     Dear Colleague,    Thank you for referring your patient, Reuben Padilla, to the [facility]. Please see a copy of my visit note below.    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 His tumor is positive for cytokeratin 20, negative for P63 and CK7, and HER2 is negative. He started on FOLFOX (5FU/oxaliplatin) on 5/15/2017. He had a delay in treatment from 9/5-10/2/17 due to work related injury. He had an excellent response by imaging throughout the summer 2017. He had progression of disease by PET/CT in 12/2017.  He underwent biopsy for Foundation One testing which is currently pending.  Plan was changed to Taxol and Cymramza, although Cyramza on hold for 2 weeks due to recent PE. Please see previous notes for further details on the patient's history. He was in lab today prior to infusion appointment when he had syncopal event.    Reuben was seen here last week on 1/8/18 as an add-on due to syncopal event.  Most likely vasovagal reaction since he had had previous syncopal episodes with blood draws. Please see note for full details.  His chemotherapy was continued and he is here today for cycle 1, day 15 of Taxol/Cyramza.     Interval History: Reuben presents today alone. He is frustrated about the lack of communication that has been ongoing throughout.  He understands different schedules, however he is hoping to see the same BERENICE every time, if possible.  He also hopes to have better communication regarding his upcoming appointments and any changes.    He states he has been having diarrhea since starting Taxol on day 3 typically getting worse over the next couple  days and then improving.  He admits to having an episode of diarrhea this morning with occasional bright red blood, however this is not explosive.  He admits it is a little mushy and not watery.  He has not been on any recent antibiotics or traveling outside the country.  He states he tried Imodium however did not help.  He admits to only taking 2-3 tablets per day.  He has been eating and drinking normally and states he has been taking an greater than 64 ounces of fluids per day.  He denies any fever or chills (although he states that he is overall colder than normal since starting FOLFOX chemotherapy, which is unchanged today).  He admits to ongoing peripheral neuropathy from FOLFOX and is slowly improved as well, however he has noted some minimal neuropathy on all 5 fingertips since his  Taxol infusion last week.  This does not affect his function at all.  He has chronic toe neuropathy from FOLFOX and that is not getting worse.  He denies any urinary changes, bleeding, constipation, vomiting.  Denies chest pain, shortness of breath, sore throat, cough, abdominal pain, skin changes.    Reuben admits that questions about pain seem to really bother him.  He is concerned that if he does not have any pain, that the chemotherapy may not be working.  He is requesting that questions regarding pain not be asked of him during his clinic visits.  These questions make him emotional and he is concerned about the unknown    Review of Systems:  Patient denies any of the following except if noted above: fevers, chills, nausea, vomiting, headaches, numbness, tingling, bleeding or bruising issues.    Current Outpatient Prescriptions   Medication Sig Dispense Refill     enoxaparin (LOVENOX) 80 MG/0.8ML injection Inject 1.3 mLs (130 mg) Subcutaneous 2 times daily 60 Syringe 3     zolpidem (AMBIEN) 10 MG tablet Take 1 tablet (10 mg) by mouth nightly as needed 60 tablet 1     ondansetron (ZOFRAN) 8 MG tablet Take 1 tablet (8 mg) by  mouth every 8 hours as needed (Nausea/Vomiting) 30 tablet 2     lidocaine-prilocaine (EMLA) cream Apply topically as needed for moderate pain 30 g 3     multivitamin, therapeutic with minerals (MULTI-VITAMIN) TABS tablet Take 1 tablet by mouth daily       timolol (TIMOPTIC) 0.5 % ophthalmic solution Place into both eyes daily       LORazepam (ATIVAN) 0.5 MG tablet Take 1 tablet (0.5 mg) by mouth every 4 hours as needed (Anxiety, Nausea/Vomiting or Sleep) (Patient not taking: Reported on 1/15/2018) 30 tablet 2     prochlorperazine (COMPAZINE) 10 MG tablet Take 1 tablet (10 mg) by mouth every 6 hours as needed (Nausea/Vomiting) (Patient not taking: Reported on 1/22/2018) 30 tablet 2     IBUPROFEN PO Take 600 mg by mouth every 4 hours as needed for moderate pain       [DISCONTINUED] dexamethasone (DECADRON) 4 MG tablet Take two tablets daily on days 2,3 and then 1 tablet daily days 4,5 6 tablet 1     fish oil-omega-3 fatty acids 1000 MG capsule Take 2 g by mouth daily       Physical Examination:  General: The patient is a pleasant male, seated in infusion chair, in no acute distress.   /75  Pulse 99  Temp 97.2  F (36.2  C) (Oral)  Resp 18  Wt (!) 161.3 kg (355 lb 9.6 oz)  SpO2 97%  BMI 41.1 kg/m2   Wt Readings from Last 10 Encounters:   01/22/18 (!) 161.3 kg (355 lb 9.6 oz)   01/08/18 (!) 163.3 kg (360 lb)   12/27/17 (!) 158.8 kg (350 lb)   12/12/17 (!) 159.2 kg (351 lb)   12/11/17 (!) 159.9 kg (352 lb 9.6 oz)   11/28/17 (!) 163.1 kg (359 lb 9.6 oz)   11/13/17 (!) 163.8 kg (361 lb 3.2 oz)   10/30/17 (!) 159.3 kg (351 lb 4.8 oz)   10/17/17 (!) 162.2 kg (357 lb 9.6 oz)   10/02/17 (!) 160.1 kg (353 lb)     HEENT: EOMI, PERRL. Sclerae are anicteric.   Heart: Irregular rate and rhythm.   Lungs: Clear to auscultation bilaterally.   Extremities: Hyperpigmentation of right leg. Right leg a little more swollen than left but patient states this is chronic and unchanged.  Neuro: grossly non-focal  Skin: No rashes,  petechiae. Bruising noted on abdomen. Psych: he is tearful today    Laboratory Data:  Results for KORI CHANEY (MRN [id number]) as of 1/22/2018 14:54   1/22/2018 09:32 1/22/2018 11:50 1/22/2018 12:24   Sodium   139   Potassium   4.1   Chloride   108   Carbon Dioxide   25   Urea Nitrogen   13   Creatinine   0.85   GFR Estimate   >90   GFR Estimate If Black   >90   Calcium   8.7   Anion Gap   6   Albumin   3.5   Protein Total   7.4   Bilirubin Total   0.4   Alkaline Phosphatase   79   ALT   33   AST   26   Glucose   97   WBC 2.6 (L)     Hemoglobin 13.0 (L)     Hematocrit 39.0 (L)     Platelet Count 172     RBC Count 4.18 (L)     MCV 93     MCH 31.1     MCHC 33.3     RDW 13.3     Diff Method Automated Method     % Neutrophils 40.5     % Lymphocytes 51.7     % Monocytes 6.6     % Eosinophils 0.4     % Basophils 0.4     % Immature Granulocytes 0.4     Nucleated RBCs 0     Absolute Neutrophil 1.1 (L)     Absolute Lymphocytes 1.3     Absolute Monocytes 0.2     Absolute Eosinophils 0.0     Absolute Basophils 0.0     Abs Immature Granulocytes 0.0     Absolute Nucleated RBC 0.0     Protein Albumin Urine  10 (A)        Assessment and Plan:  1. Metastatic esophogeal cancer.  He had progression of disease by PET/CT in 12/2017.  He underwent biopsy for Foundation One testing which is currently pending and spoke to care coordinator to help assist with obtaining results.  Plan was changed to Taxol and Cymramza, although Cyramza was on hold for 2 weeks due to recent PE.  He has tolerated Taxol fairly well.  WBC 2.6 and ANC 1.1, likely from chemotherapy and although below treatment parameters, patient is feeling extremely well and will proceed with Taxol and first dose of Cyramza today (cycle 1, day 15).   --2/5 Nicole Sutherland PA-C, Taxol/Cyramza C2D1  --2/12 Taxol/Cyramza C2D8  --2/19 Nicole Sutherland PA-C, Taxol/Cyramza C2D15  --3/6 CTcap, Dr Dee    2. Syncope: Occurred during blood draw last week.  Workup unremarkable and  proceeded with chemotherapy.  No symptoms during blood draw today and has been asymptomatic since this episode.  Vital signs stable today.  Will continue to monitor.     3. Diarrhea: Likely from Taxol and would recommend patient increasing the number of tablets of Imodium per day.  If no improvement with increase in Imodium, would consider stool culture, however no indication today.  We will continue to monitor.  Electrolytes normal with out evidence of dehydration today.    4. Atrial fibrillation: Evaluated by cardiology on 8/21/17.  Heart rate in clinic today 99 and asymptomatic.  Currently on Lovenox.  --Will refer to cardiology if becomes symptomatic     5. H/o PE: This was originally identified on staging in 07/2017.  He was on Xarelto.  At his restaging evaluation he had progressive symptoms with more clot burden and was started on Lovenox 1 mg/kg twice daily. Hep 10a was 1.02 on 12/26/17. He administered his Lovenox at 5:30 AM today and Hep 10a is pending.    6. Coping: Reuben is not coping well.  He was tearful in clinic today.  Concerned about the lack of communication in the clinic with his chemotherapy plan and he does not feel his family completely understands his situation.  Would recommend seeing Darrius (therapist), again soon.  Please refrain from asking him about his pain (see interval history).     7. Peripheral neuropathy: Chronic since FOLFOX therapy and slightly increased neuropathy on the fingertips of all 5 fingers.  Grade 1 at this time. Continue to monitor.      Chelo Rogers PA-C    I saw patient and performed the ROS and exam myself.  The assessment and plan were mutually discussed and this note was edited to reflect my findings.  Nicole Sutherland PA-C    Again, thank you for allowing me to participate in the care of your patient.      Sincerely,    Loraine Sutherland PA-C

## 2018-01-22 NOTE — MR AVS SNAPSHOT
After Visit Summary   1/22/2018    Reuben Padilla    MRN: [id number]           Patient Information     Date Of Birth          1960        Visit Information        Provider Department      1/22/2018 10:30 AM UC 16 ATC;  ONCOLOGY INFUSION [facility]        Today's Diagnoses     Cancer of distal third of esophagus (H)    -  1    Other acute pulmonary embolism without acute cor pulmonale (H)          Care Instructions    Contact Numbers    [facility] [place] [place] Main Line: [phone]  [facility] [place] [place] Triage:  [phone]    Call triage with chills and/or temperature greater than or equal to 100.5, uncontrolled nausea/vomiting, diarrhea, constipation, dizziness, shortness of breath, chest pain, bleeding, unexplained bruising, or any new/concerning symptoms, questions/concerns.     If you are having any concerning symptoms or wish to speak to a provider before your next infusion visit, please call your care coordinator or triage to notify them so we can adequately serve you.       After Hours: [phone]    If after hours, weekends, or holidays, call main hospital  and ask for Oncology doctor on call.           January 2018 Sunday Monday Tuesday Wednesday Thursday Friday Saturday        1     2     3     4     5     6       7     8     [facility] DRAW   11:30 AM   (15 min.)    [facility] DRAW   [facility] Lab Draw     [facility] RETURN   11:45 AM   (30 min.)   Kadi Dee MD   [facility]     [facility] ONC INFUSION 240    1:00 PM   (240 min.)    ONCOLOGY INFUSION   [facility] 9     10     11     12     13       14     15     [facility] RETURN    8:45 AM   (50 min.)   Amparo Sow [facility]   [facility]    8:45 AM   (15 min.)    [facility] DRAW   [facility] Lab Draw     [facility] ONC INFUSION 120    9:30 AM   (120 min.)    ONCOLOGY INFUSION   [facility] 16     17     18     19      20       21     22     [facility] DRAW    8:45 AM   (15 min.)   [facility]   [facility] Draw     [facility] RETURN    9:05 AM   (50 min.)   Loraine Sutherland PA-C   [facility]     [facility] ONC INFUSION 180   10:30 AM   (180 min.)    ONCOLOGY INFUSION   [facility] 23     24     25     26     27       28     29     30     31 February 2018 Sunday Monday Tuesday Wednesday Thursday Friday Saturday                       1     2     3       4     5     [facility] DRAW    9:30 AM   (15 min.)   [facility] DRAW   [facility] Draw     [facility] RETURN    9:55 AM   (50 min.)   Loraine Sutherland PA-C   [facility]     [facility] ONC INFUSION 180   11:00 AM   (180 min.)    ONCOLOGY INFUSION   [facility] 6     7     8     9     10       11     12     [facility] DRAW    8:00 AM   (15 min.)   [facility] DRAW   [facility] Draw     [facility] ONC INFUSION 120    8:30 AM   (120 min.)   UC ONCOLOGY INFUSION   [facility] 13     14     15     16     17       18     19     [facility] DRAW   11:15 AM   (15 min.)   [facility] DRAW   [facility] Draw     [facility] RETURN   11:35 AM   (50 min.)   Loraine Sutherland PA-C   [facility]     [facility] ONC INFUSION 180   12:30 PM   (180 min.)    ONCOLOGY INFUSION   [facility] 20     21     22     23     24       25     26     27     28                                 Lab Results:  Recent Results (from the past 12 hour(s))   CBC with platelets differential    Collection Time: 01/22/18  9:32 AM   Result Value Ref Range    WBC 2.6 (L) 4.0 - 11.0 10e9/L    RBC Count 4.18 (L) 4.4 - 5.9 10e12/L    Hemoglobin 13.0 (L) 13.3 - 17.7 g/dL    Hematocrit 39.0 (L) 40.0 - 53.0 %    MCV 93 78 - 100 fl    MCH 31.1 26.5 - 33.0 pg    MCHC 33.3 31.5 - 36.5 g/dL    RDW 13.3 10.0 - 15.0 %    Platelet Count  172 150 - 450 10e9/L    Diff Method Automated Method     % Neutrophils 40.5 %    % Lymphocytes 51.7 %    % Monocytes 6.6 %    % Eosinophils 0.4 %    % Basophils 0.4 %    % Immature Granulocytes 0.4 %    Nucleated RBCs 0 0 /100    Absolute Neutrophil 1.1 (L) 1.6 - 8.3 10e9/L    Absolute Lymphocytes 1.3 0.8 - 5.3 10e9/L    Absolute Monocytes 0.2 0.0 - 1.3 10e9/L    Absolute Eosinophils 0.0 0.0 - 0.7 10e9/L    Absolute Basophils 0.0 0.0 - 0.2 10e9/L    Abs Immature Granulocytes 0.0 0 - 0.4 10e9/L    Absolute Nucleated RBC 0.0    Protein qualitative urine    Collection Time: 01/22/18 11:50 AM   Result Value Ref Range    Protein Albumin Urine 10 (A) NEG^Negative mg/dL   Comprehensive metabolic panel    Collection Time: 01/22/18 12:24 PM   Result Value Ref Range    Sodium 139 133 - 144 mmol/L    Potassium 4.1 3.4 - 5.3 mmol/L    Chloride 108 94 - 109 mmol/L    Carbon Dioxide 25 20 - 32 mmol/L    Anion Gap 6 3 - 14 mmol/L    Glucose 97 70 - 99 mg/dL    Urea Nitrogen 13 7 - 30 mg/dL    Creatinine 0.85 0.66 - 1.25 mg/dL    GFR Estimate >90 >60 mL/min/1.7m2    GFR Estimate If Black >90 >60 mL/min/1.7m2    Calcium 8.7 8.5 - 10.1 mg/dL    Bilirubin Total 0.4 0.2 - 1.3 mg/dL    Albumin 3.5 3.4 - 5.0 g/dL    Protein Total 7.4 6.8 - 8.8 g/dL    Alkaline Phosphatase 79 40 - 150 U/L    ALT 33 0 - 70 U/L    AST 26 0 - 45 U/L               Follow-ups after your visit        Your next 10 appointments already scheduled     Feb 05, 2018  9:30 AM CST   [facility] Draw with  [facility] DRAW   [facility] Draw ([facility])    [address]  [address] [place] [id number] [phone]            Feb 05, 2018 10:10 AM CST   (Arrive by 9:55 AM)   Return Visit with SWAPNIL Young [facility] ([facility])    [address]  [address]  [facility] [address] [phone]            Feb 05, 2018 11:00 AM CST   Infusion 180 with UC  ONCOLOGY INFUSION, UC 24 ATC   [facility] ([facility])    [address]  [address]  [facility] [address]   [phone]            Feb 12, 2018  8:00 AM CST   [facility] Lab Draw with UC [facility] LAB DRAW   [facility] Lab Draw ([facility])    [address] [place] [address]  [address]  [facility] [address]   [phone]            Feb 12, 2018  8:30 AM CST   Infusion 120 with UC ONCOLOGY INFUSION, UC 19 ATC   [facility] ([facility])    [address] [place] [address]  [address]  [facility] [address]   [phone]            Feb 19, 2018 11:15 AM CST   [facility] Lab Draw with UC [facility] LAB DRAW   [facility] Lab Draw ([facility])    [address] [place] [address]  [address]  [facility] [address]   [phone]            Feb 19, 2018 11:50 AM CST   (Arrive by 11:35 AM)   Return Visit with Loraine Sutherland PA-C   [facility] ([facility])    [address] [place] [address]  [address]  [facility] [address] [phone]            Feb 19, 2018 12:30 PM CST   Infusion 180 with UC ONCOLOGY INFUSION   [facility] ([facility])    [address] [place] [address]  [address]  [facility] [address]   [phone]              Who to contact     If you have questions or need follow up information about today's clinic visit or your schedule please contact [facility] directly at [phone].  Normal or non-critical lab and imaging results will be communicated to you by MyChart, letter or phone within 4 business days after the clinic has received the results. If you do not hear from us within 7 days, please contact the clinic through MyChart or phone. If you have a critical or abnormal lab result, we will notify you by phone as soon as possible.  Submit refill requests through [facility] or call  your pharmacy and they will forward the refill request to us. Please allow 3 business days for your refill to be completed.          Additional Information About Your Visit        [employer] Information     [facility] [employer] gives you secure access to your electronic health record. If you see a primary care provider, you can also send messages to your care team and make appointments. If you have questions, please call your primary care clinic.  If you do not have a primary care provider, please call [phone] and they will assist you.        Care EveryWhere ID     This is your Care EveryWhere ID. This could be used by other organizations to access your [facility] medical records  [id number]         Blood Pressure from Last 3 Encounters:   01/22/18 111/75   01/15/18 123/84   01/08/18 117/82    Weight from Last 3 Encounters:   01/22/18 (!) 161.3 kg (355 lb 9.6 oz)   01/08/18 (!) 163.3 kg (360 lb)   12/27/17 (!) 158.8 kg (350 lb)              We Performed the Following     CBC with platelets differential     Heparin 10a Level     Protein qualitative urine        Primary Care Provider Fax #    Physician No Ref-Primary [phone]       No address on file        Equal Access to Services     CARLITOS SHARPE : Hadii antonio Dunlap, wajeanda ryan, qaybta kaalmada adejames, geraldo washington . So [facility] [phone].    ATENCIÓN: Si habla español, tiene a alamo disposición servicios gratuitos de asistencia lingüística. Llame al [phone].    We comply with applicable federal civil rights laws and [place] laws. We do not discriminate on the basis of race, color, national origin, age, disability, sex, sexual orientation, or gender identity.            Thank you!     Thank you for choosing [facility] CANCER [facility]  for your care. Our goal is always to provide you with excellent care. Hearing back from our patients is one way we can continue to improve our services. Please take a few minutes to  complete the written survey that you may receive in the mail after your visit with us. Thank you!             Your Updated Medication List - Protect others around you: Learn how to safely use, store and throw away your medicines at www.disposemymeds.org.          This list is accurate as of: 1/22/18  3:12 PM.  Always use your most recent med list.                   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)       LORazepam 0.5 MG tablet    ATIVAN    30 tablet    Take 1 tablet (0.5 mg) by mouth every 4 hours as needed (Anxiety, Nausea/Vomiting or Sleep)    Cancer of distal third of esophagus (H)       Multi-vitamin Tabs tablet      Take 1 tablet by mouth daily        ondansetron 8 MG tablet    ZOFRAN    30 tablet    Take 1 tablet (8 mg) by mouth every 8 hours as needed (Nausea/Vomiting)    Cancer of distal third of esophagus (H)       prochlorperazine 10 MG tablet    COMPAZINE    30 tablet    Take 1 tablet (10 mg) by mouth every 6 hours as needed (Nausea/Vomiting)    Cancer of distal third of esophagus (H)       timolol 0.5 % ophthalmic solution    TIMOPTIC     Place into both eyes daily    Cancer of distal third of esophagus (H)       zolpidem 10 MG tablet    AMBIEN    60 tablet    Take 1 tablet (10 mg) by mouth nightly as needed    Metastasis from gastric cancer (H)

## 2018-01-22 NOTE — PROGRESS NOTES
Infusion Nursing Note:  Reuben Padilla presents today for cycle 1, day 15 Taxol and Cyramza.    Patient seen by provider today: Yes: SERGIO Barker and SERGIO Mcgovern   present during visit today: Not Applicable.    Note: This writer reinforced teaching regarding Cyramza.     Intravenous Access:  Implanted Port.    Treatment Conditions:  Lab Results   Component Value Date    HGB 13.0 01/22/2018     Lab Results   Component Value Date    WBC 2.6 01/22/2018      Lab Results   Component Value Date    ANEU 1.1 01/22/2018     Lab Results   Component Value Date     01/22/2018      Lab Results   Component Value Date     01/22/2018                   Lab Results   Component Value Date    POTASSIUM 4.1 01/22/2018           Lab Results   Component Value Date    MAG 1.7 01/15/2018            Lab Results   Component Value Date    CR 0.85 01/22/2018                   Lab Results   Component Value Date    NIDHI 8.7 01/22/2018                Lab Results   Component Value Date    BILITOTAL 0.4 01/22/2018           Lab Results   Component Value Date    ALBUMIN 3.5 01/22/2018                    Lab Results   Component Value Date    ALT 33 01/22/2018           Lab Results   Component Value Date    AST 26 01/22/2018     Results reviewed, labs MET treatment parameters, ok to proceed with treatment.  Urine protein: 10.    Post Infusion Assessment:  Patient tolerated infusion without incident.  Blood return noted pre and post infusion.  Site patent and intact, free from redness, edema or discomfort.  No evidence of extravasations.  Access discontinued per protocol.    Discharge Plan:   Patient declined prescription refills.  Discharge instructions reviewed with: Patient.  Patient and/or family verbalized understanding of discharge instructions and all questions answered.  Copy of AVS reviewed with patient and/or family.  Patient will return 2/5/2018 for next appointment.  Patient discharged in stable  condition accompanied by: self.  Departure Mode: Ambulatory.    Jose Olivo RN

## 2018-01-22 NOTE — PROGRESS NOTES
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 His tumor is positive for cytokeratin 20, negative for P63 and CK7, and HER2 is negative. He started on FOLFOX (5FU/oxaliplatin) on 5/15/2017. He had a delay in treatment from 9/5-10/2/17 due to work related injury. He had an excellent response by imaging throughout the summer 2017. He had progression of disease by PET/CT in 12/2017.  He underwent biopsy for Foundation One testing which is currently pending.  Plan was changed to Taxol and Cymramza, although Cyramza on hold for 2 weeks due to recent PE. Please see previous notes for further details on the patient's history. He was in lab today prior to infusion appointment when he had syncopal event.    Reuben was seen here last week on 1/8/18 as an add-on due to syncopal event.  Most likely vasovagal reaction since he had had previous syncopal episodes with blood draws. Please see note for full details.  His chemotherapy was continued and he is here today for cycle 1, day 15 of Taxol/Cyramza.     Interval History: Reuben presents today alone. He is frustrated about the lack of communication that has been ongoing throughout.  He understands different schedules, however he is hoping to see the same BERENICE every time, if possible.  He also hopes to have better communication regarding his upcoming appointments and any changes.    He states he has been having diarrhea since starting Taxol on day 3 typically getting worse over the next couple days and then improving.  He admits to having an episode of diarrhea this morning with occasional bright red blood, however this is not explosive.  He admits it is a little mushy and not watery.  He has not been on any recent antibiotics or  traveling outside the country.  He states he tried Imodium however did not help.  He admits to only taking 2-3 tablets per day.  He has been eating and drinking normally and states he has been taking an greater than 64 ounces of fluids per day.  He denies any fever or chills (although he states that he is overall colder than normal since starting FOLFOX chemotherapy, which is unchanged today).  He admits to ongoing peripheral neuropathy from FOLFOX and is slowly improved as well, however he has noted some minimal neuropathy on all 5 fingertips since his  Taxol infusion last week.  This does not affect his function at all.  He has chronic toe neuropathy from FOLFOX and that is not getting worse.  He denies any urinary changes, bleeding, constipation, vomiting.  Denies chest pain, shortness of breath, sore throat, cough, abdominal pain, skin changes.    Reuben admits that questions about pain seem to really bother him.  He is concerned that if he does not have any pain, that the chemotherapy may not be working.  He is requesting that questions regarding pain not be asked of him during his clinic visits.  These questions make him emotional and he is concerned about the unknown    Review of Systems:  Patient denies any of the following except if noted above: fevers, chills, nausea, vomiting, headaches, numbness, tingling, bleeding or bruising issues.    Current Outpatient Prescriptions   Medication Sig Dispense Refill     enoxaparin (LOVENOX) 80 MG/0.8ML injection Inject 1.3 mLs (130 mg) Subcutaneous 2 times daily 60 Syringe 3     zolpidem (AMBIEN) 10 MG tablet Take 1 tablet (10 mg) by mouth nightly as needed 60 tablet 1     ondansetron (ZOFRAN) 8 MG tablet Take 1 tablet (8 mg) by mouth every 8 hours as needed (Nausea/Vomiting) 30 tablet 2     lidocaine-prilocaine (EMLA) cream Apply topically as needed for moderate pain 30 g 3     multivitamin, therapeutic with minerals (MULTI-VITAMIN) TABS tablet Take 1 tablet by  mouth daily       timolol (TIMOPTIC) 0.5 % ophthalmic solution Place into both eyes daily       LORazepam (ATIVAN) 0.5 MG tablet Take 1 tablet (0.5 mg) by mouth every 4 hours as needed (Anxiety, Nausea/Vomiting or Sleep) (Patient not taking: Reported on 1/15/2018) 30 tablet 2     prochlorperazine (COMPAZINE) 10 MG tablet Take 1 tablet (10 mg) by mouth every 6 hours as needed (Nausea/Vomiting) (Patient not taking: Reported on 1/22/2018) 30 tablet 2     IBUPROFEN PO Take 600 mg by mouth every 4 hours as needed for moderate pain       [DISCONTINUED] dexamethasone (DECADRON) 4 MG tablet Take two tablets daily on days 2,3 and then 1 tablet daily days 4,5 6 tablet 1     fish oil-omega-3 fatty acids 1000 MG capsule Take 2 g by mouth daily       Physical Examination:  General: The patient is a pleasant male, seated in infusion chair, in no acute distress.   /75  Pulse 99  Temp 97.2  F (36.2  C) (Oral)  Resp 18  Wt (!) 161.3 kg (355 lb 9.6 oz)  SpO2 97%  BMI 41.1 kg/m2   Wt Readings from Last 10 Encounters:   01/22/18 (!) 161.3 kg (355 lb 9.6 oz)   01/08/18 (!) 163.3 kg (360 lb)   12/27/17 (!) 158.8 kg (350 lb)   12/12/17 (!) 159.2 kg (351 lb)   12/11/17 (!) 159.9 kg (352 lb 9.6 oz)   11/28/17 (!) 163.1 kg (359 lb 9.6 oz)   11/13/17 (!) 163.8 kg (361 lb 3.2 oz)   10/30/17 (!) 159.3 kg (351 lb 4.8 oz)   10/17/17 (!) 162.2 kg (357 lb 9.6 oz)   10/02/17 (!) 160.1 kg (353 lb)     HEENT: EOMI, PERRL. Sclerae are anicteric.   Heart: Irregular rate and rhythm.   Lungs: Clear to auscultation bilaterally.   Extremities: Hyperpigmentation of right leg. Right leg a little more swollen than left but patient states this is chronic and unchanged.  Neuro: grossly non-focal  Skin: No rashes, petechiae. Bruising noted on abdomen. Psych: he is tearful today    Laboratory Data:  Results for KORI CHANEY (MRN [id number]) as of 1/22/2018 14:54   1/22/2018 09:32 1/22/2018 11:50 1/22/2018 12:24   Sodium   139   Potassium   4.1    Chloride   108   Carbon Dioxide   25   Urea Nitrogen   13   Creatinine   0.85   GFR Estimate   >90   GFR Estimate If Black   >90   Calcium   8.7   Anion Gap   6   Albumin   3.5   Protein Total   7.4   Bilirubin Total   0.4   Alkaline Phosphatase   79   ALT   33   AST   26   Glucose   97   WBC 2.6 (L)     Hemoglobin 13.0 (L)     Hematocrit 39.0 (L)     Platelet Count 172     RBC Count 4.18 (L)     MCV 93     MCH 31.1     MCHC 33.3     RDW 13.3     Diff Method Automated Method     % Neutrophils 40.5     % Lymphocytes 51.7     % Monocytes 6.6     % Eosinophils 0.4     % Basophils 0.4     % Immature Granulocytes 0.4     Nucleated RBCs 0     Absolute Neutrophil 1.1 (L)     Absolute Lymphocytes 1.3     Absolute Monocytes 0.2     Absolute Eosinophils 0.0     Absolute Basophils 0.0     Abs Immature Granulocytes 0.0     Absolute Nucleated RBC 0.0     Protein Albumin Urine  10 (A)        Assessment and Plan:  1. Metastatic esophogeal cancer.  He had progression of disease by PET/CT in 12/2017.  He underwent biopsy for Foundation One testing which is currently pending and spoke to care coordinator to help assist with obtaining results.  Plan was changed to Taxol and Cymramza, although Cyramza was on hold for 2 weeks due to recent PE.  He has tolerated Taxol fairly well.  WBC 2.6 and ANC 1.1, likely from chemotherapy and although below treatment parameters, patient is feeling extremely well and will proceed with Taxol and first dose of Cyramza today (cycle 1, day 15).   --2/5 Nicole Sutherland PA-C, Taxol/Cyramza C2D1  --2/12 Taxol/Cyramza C2D8  --2/19 Nicole Sutherland PA-C, Taxol/Cyramza C2D15  --3/6 CTcap, Dr Dee    2. Syncope: Occurred during blood draw last week.  Workup unremarkable and proceeded with chemotherapy.  No symptoms during blood draw today and has been asymptomatic since this episode.  Vital signs stable today.  Will continue to monitor.     3. Diarrhea: Likely from Taxol and would recommend patient increasing  the number of tablets of Imodium per day.  If no improvement with increase in Imodium, would consider stool culture, however no indication today.  We will continue to monitor.  Electrolytes normal with out evidence of dehydration today.    4. Atrial fibrillation: Evaluated by cardiology on 8/21/17.  Heart rate in clinic today 99 and asymptomatic.  Currently on Lovenox.  --Will refer to cardiology if becomes symptomatic     5. H/o PE: This was originally identified on staging in 07/2017.  He was on Xarelto.  At his restaging evaluation he had progressive symptoms with more clot burden and was started on Lovenox 1 mg/kg twice daily. Hep 10a was 1.02 on 12/26/17. He administered his Lovenox at 5:30 AM today and Hep 10a is pending.    6. Coping: Reuben is not coping well.  He was tearful in clinic today.  Concerned about the lack of communication in the clinic with his chemotherapy plan and he does not feel his family completely understands his situation.  Would recommend seeing Darrius (therapist), again soon.  Please refrain from asking him about his pain (see interval history).     7. Peripheral neuropathy: Chronic since FOLFOX therapy and slightly increased neuropathy on the fingertips of all 5 fingers.  Grade 1 at this time. Continue to monitor.      Chelo Rogers PA-C    I saw patient and performed the ROS and exam myself.  The assessment and plan were mutually discussed and this note was edited to reflect my findings.  Nicole Sutherland PA-C

## 2018-01-23 ENCOUNTER — CARE COORDINATION (OUTPATIENT)
Dept: ONCOLOGY | Facility: CLINIC | Age: 58
End: 2018-01-23

## 2018-01-23 ENCOUNTER — DOCUMENTATION ONLY (OUTPATIENT)
Dept: ONCOLOGY | Facility: CLINIC | Age: 58
End: 2018-01-23

## 2018-01-23 DIAGNOSIS — I26.99 PULMONARY EMBOLUS (H): Primary | ICD-10-CM

## 2018-01-23 DIAGNOSIS — Z79.899 ENCOUNTER FOR LONG-TERM (CURRENT) USE OF MEDICATIONS: ICD-10-CM

## 2018-01-23 LAB — LMWH PPP CHRO-ACNC: 1.25 IU/ML

## 2018-01-23 NOTE — PROGRESS NOTES
Spoke to Dr. Dee and patient.  He plans to stop in [place] on Wednesday to have labs redrawn.  He will call when he is at the lab to get the fax number.

## 2018-01-23 NOTE — PROGRESS NOTES
Spoke to Reuben to let him know that his hep XA level was elevated.  Yesterday it was at 1.25.  He stated that he injected his lovenox at 0530 and had his labs drawn at 1132.  He is not able to come in and get redrawn at the request of .  He will be in [place] and not returning until Monday. Let him know that I will let her know and wait for a response.      Foundation One request was refaxed to [phone] with the correct pathology specimen from 12/27/17   Original specimen was received at [facility] on 1/9/18 with the pathology specimen from [facility].    Results will be available in 2-3 weeks.  Per , this information will be used when there is progression.  She wants him to complete 2 months of the current therapy he is on.

## 2018-01-23 NOTE — PROGRESS NOTES
DATE:  1/23/2018   TIME OF RECEIPT FROM LAB:  10:37am  LAB TEST:  Heparin 10A  LAB VALUE:  1.25  RESULTS GIVEN :  MALAIKA WESTFALL   TIME LAB VALUE REPORTED TO PROVIDER:   10:38am

## 2018-01-24 LAB — HEPARIN 10A LEVEL: 1.28 (ref ?–1)

## 2018-01-24 NOTE — PROGRESS NOTES
This is a recent snapshot of the patient's [facility] medical record.  For current drug dose and complete information and questions, call [phone] or In Basket pool, [facility] home infusion ([id number])  CSN Number:  [id number]

## 2018-01-24 NOTE — PROGRESS NOTES
Yesterday,  Patient was asked to reduce his enoxaparin to 120mg BID and to get his heparin 10a level checked again today.  This morning he injected 120mg at 7am and had his labs drawn at 11am.  Heparin 10a level today is still high at 1.28.  Will notify Dr. Dee.     Spoke with patient and relayed Dr. Dee recommendation to continue at enoxaparin 120mg BID and have Heparin 10a level checked next week.  Also reviewed lab level and reviewed signs of bleeding and increased risk.  Patient verbalized understanding.

## 2018-01-25 ASSESSMENT — ENCOUNTER SYMPTOMS
JAUNDICE: 0
NIGHT SWEATS: 0
RECTAL PAIN: 0
BRUISES/BLEEDS EASILY: 1
LOSS OF CONSCIOUSNESS: 0
LIGHT-HEADEDNESS: 0
SPEECH CHANGE: 0
STIFFNESS: 0
HALLUCINATIONS: 0
BLOOD IN STOOL: 0
INCREASED ENERGY: 1
HYPERTENSION: 0
LEG PAIN: 0
JOINT SWELLING: 0
ABDOMINAL PAIN: 0
WEIGHT GAIN: 0
DIZZINESS: 1
DECREASED APPETITE: 0
SYNCOPE: 0
NECK PAIN: 0
HEARTBURN: 0
SWOLLEN GLANDS: 0
FEVER: 0
NAUSEA: 0
NUMBNESS: 1
BACK PAIN: 0
MUSCLE CRAMPS: 0
WEIGHT LOSS: 1
POLYPHAGIA: 0
HEADACHES: 0
EXERCISE INTOLERANCE: 1
MEMORY LOSS: 1
CONSTIPATION: 1
MYALGIAS: 1
ALTERED TEMPERATURE REGULATION: 1
DIARRHEA: 1
POLYDIPSIA: 0
BOWEL INCONTINENCE: 0
DISTURBANCES IN COORDINATION: 1
PARALYSIS: 0
MUSCLE WEAKNESS: 1
PALPITATIONS: 0
TINGLING: 0
VOMITING: 0
CHILLS: 1
WEAKNESS: 1
ORTHOPNEA: 0
TREMORS: 0
FATIGUE: 1
BLOATING: 0
ARTHRALGIAS: 0
SLEEP DISTURBANCES DUE TO BREATHING: 0
HYPOTENSION: 1
SEIZURES: 0

## 2018-01-29 ENCOUNTER — CARE COORDINATION (OUTPATIENT)
Dept: ONCOLOGY | Facility: CLINIC | Age: 58
End: 2018-01-29

## 2018-01-29 DIAGNOSIS — Z79.899 ENCOUNTER FOR LONG-TERM (CURRENT) USE OF MEDICATIONS: ICD-10-CM

## 2018-01-29 DIAGNOSIS — I26.99 PULMONARY EMBOLUS (H): ICD-10-CM

## 2018-01-29 LAB — HEPARIN 10A LEVEL: 0.81

## 2018-02-05 ENCOUNTER — INFUSION THERAPY VISIT (OUTPATIENT)
Dept: ONCOLOGY | Facility: CLINIC | Age: 58
End: 2018-02-05
Attending: INTERNAL MEDICINE
Payer: COMMERCIAL

## 2018-02-05 ENCOUNTER — APPOINTMENT (OUTPATIENT)
Dept: LAB | Facility: CLINIC | Age: 58
End: 2018-02-05
Attending: PHYSICIAN ASSISTANT
Payer: COMMERCIAL

## 2018-02-05 VITALS
OXYGEN SATURATION: 96 % | HEART RATE: 95 BPM | BODY MASS INDEX: 41.99 KG/M2 | SYSTOLIC BLOOD PRESSURE: 126 MMHG | DIASTOLIC BLOOD PRESSURE: 72 MMHG | WEIGHT: 315 LBS | RESPIRATION RATE: 18 BRPM | TEMPERATURE: 97.9 F

## 2018-02-05 DIAGNOSIS — C15.5 CANCER OF DISTAL THIRD OF ESOPHAGUS (H): ICD-10-CM

## 2018-02-05 DIAGNOSIS — C16.9 METASTASIS FROM GASTRIC CANCER (H): ICD-10-CM

## 2018-02-05 DIAGNOSIS — C79.9 METASTASIS FROM GASTRIC CANCER (H): ICD-10-CM

## 2018-02-05 DIAGNOSIS — C15.5 CANCER OF DISTAL THIRD OF ESOPHAGUS (H): Primary | ICD-10-CM

## 2018-02-05 LAB
ALBUMIN SERPL-MCNC: 3.3 G/DL (ref 3.4–5)
ALBUMIN UR-MCNC: 30 MG/DL
ALP SERPL-CCNC: 84 U/L (ref 40–150)
ALT SERPL W P-5'-P-CCNC: 20 U/L (ref 0–70)
AST SERPL W P-5'-P-CCNC: 18 U/L (ref 0–45)
BASOPHILS # BLD AUTO: 0 10E9/L (ref 0–0.2)
BASOPHILS NFR BLD AUTO: 1 %
BILIRUB DIRECT SERPL-MCNC: 0.2 MG/DL (ref 0–0.2)
BILIRUB SERPL-MCNC: 0.6 MG/DL (ref 0.2–1.3)
DIFFERENTIAL METHOD BLD: ABNORMAL
EOSINOPHIL # BLD AUTO: 0 10E9/L (ref 0–0.7)
EOSINOPHIL NFR BLD AUTO: 0.5 %
ERYTHROCYTE [DISTWIDTH] IN BLOOD BY AUTOMATED COUNT: 14 % (ref 10–15)
HCT VFR BLD AUTO: 35.7 % (ref 40–53)
HGB BLD-MCNC: 11.4 G/DL (ref 13.3–17.7)
IMM GRANULOCYTES # BLD: 0 10E9/L (ref 0–0.4)
IMM GRANULOCYTES NFR BLD: 0.5 %
LYMPHOCYTES # BLD AUTO: 0.7 10E9/L (ref 0.8–5.3)
LYMPHOCYTES NFR BLD AUTO: 38.2 %
MCH RBC QN AUTO: 30.1 PG (ref 26.5–33)
MCHC RBC AUTO-ENTMCNC: 31.9 G/DL (ref 31.5–36.5)
MCV RBC AUTO: 94 FL (ref 78–100)
MONOCYTES # BLD AUTO: 0.3 10E9/L (ref 0–1.3)
MONOCYTES NFR BLD AUTO: 15.2 %
NEUTROPHILS # BLD AUTO: 0.9 10E9/L (ref 1.6–8.3)
NEUTROPHILS NFR BLD AUTO: 44.6 %
NRBC # BLD AUTO: 0 10*3/UL
NRBC BLD AUTO-RTO: 0 /100
PLATELET # BLD AUTO: 124 10E9/L (ref 150–450)
PROT SERPL-MCNC: 7.2 G/DL (ref 6.8–8.8)
RBC # BLD AUTO: 3.79 10E12/L (ref 4.4–5.9)
WBC # BLD AUTO: 1.9 10E9/L (ref 4–11)

## 2018-02-05 PROCEDURE — G0463 HOSPITAL OUTPT CLINIC VISIT: HCPCS | Mod: ZF

## 2018-02-05 PROCEDURE — 96413 CHEMO IV INFUSION 1 HR: CPT

## 2018-02-05 PROCEDURE — 25000128 H RX IP 250 OP 636: Mod: ZF | Performed by: PHYSICIAN ASSISTANT

## 2018-02-05 PROCEDURE — 85025 COMPLETE CBC W/AUTO DIFF WBC: CPT | Performed by: INTERNAL MEDICINE

## 2018-02-05 PROCEDURE — 99214 OFFICE O/P EST MOD 30 MIN: CPT | Mod: ZP | Performed by: PHYSICIAN ASSISTANT

## 2018-02-05 PROCEDURE — 25000132 ZZH RX MED GY IP 250 OP 250 PS 637: Mod: ZF | Performed by: PHYSICIAN ASSISTANT

## 2018-02-05 PROCEDURE — 81003 URINALYSIS AUTO W/O SCOPE: CPT | Performed by: INTERNAL MEDICINE

## 2018-02-05 PROCEDURE — 80076 HEPATIC FUNCTION PANEL: CPT | Performed by: INTERNAL MEDICINE

## 2018-02-05 RX ORDER — EPINEPHRINE 1 MG/ML
0.3 INJECTION, SOLUTION, CONCENTRATE INTRAVENOUS EVERY 5 MIN PRN
Status: CANCELLED | OUTPATIENT
Start: 2018-02-12

## 2018-02-05 RX ORDER — LORAZEPAM 2 MG/ML
0.5 INJECTION INTRAMUSCULAR EVERY 4 HOURS PRN
Status: CANCELLED
Start: 2018-02-19

## 2018-02-05 RX ORDER — DIPHENHYDRAMINE HCL 25 MG
50 CAPSULE ORAL ONCE
Status: CANCELLED
Start: 2018-02-05

## 2018-02-05 RX ORDER — SODIUM CHLORIDE 9 MG/ML
1000 INJECTION, SOLUTION INTRAVENOUS CONTINUOUS PRN
Status: CANCELLED
Start: 2018-02-19

## 2018-02-05 RX ORDER — DIPHENHYDRAMINE HYDROCHLORIDE 50 MG/ML
50 INJECTION INTRAMUSCULAR; INTRAVENOUS
Status: CANCELLED
Start: 2018-02-19

## 2018-02-05 RX ORDER — METHYLPREDNISOLONE SODIUM SUCCINATE 125 MG/2ML
125 INJECTION, POWDER, LYOPHILIZED, FOR SOLUTION INTRAMUSCULAR; INTRAVENOUS
Status: CANCELLED
Start: 2018-02-19

## 2018-02-05 RX ORDER — MEPERIDINE HYDROCHLORIDE 25 MG/ML
25 INJECTION INTRAMUSCULAR; INTRAVENOUS; SUBCUTANEOUS EVERY 30 MIN PRN
Status: CANCELLED | OUTPATIENT
Start: 2018-02-19

## 2018-02-05 RX ORDER — DIPHENHYDRAMINE HYDROCHLORIDE 50 MG/ML
50 INJECTION INTRAMUSCULAR; INTRAVENOUS
Status: CANCELLED
Start: 2018-02-05

## 2018-02-05 RX ORDER — EPINEPHRINE 1 MG/ML
0.3 INJECTION, SOLUTION, CONCENTRATE INTRAVENOUS EVERY 5 MIN PRN
Status: CANCELLED | OUTPATIENT
Start: 2018-02-19

## 2018-02-05 RX ORDER — LORAZEPAM 2 MG/ML
0.5 INJECTION INTRAMUSCULAR EVERY 4 HOURS PRN
Status: CANCELLED
Start: 2018-02-12

## 2018-02-05 RX ORDER — ALBUTEROL SULFATE 0.83 MG/ML
2.5 SOLUTION RESPIRATORY (INHALATION)
Status: CANCELLED | OUTPATIENT
Start: 2018-02-12

## 2018-02-05 RX ORDER — HEPARIN SODIUM (PORCINE) LOCK FLUSH IV SOLN 100 UNIT/ML 100 UNIT/ML
500 SOLUTION INTRAVENOUS ONCE
Status: COMPLETED | OUTPATIENT
Start: 2018-02-05 | End: 2018-02-05

## 2018-02-05 RX ORDER — ALBUTEROL SULFATE 90 UG/1
1-2 AEROSOL, METERED RESPIRATORY (INHALATION)
Status: CANCELLED
Start: 2018-02-19

## 2018-02-05 RX ORDER — DIPHENHYDRAMINE HCL 25 MG
50 CAPSULE ORAL ONCE
Status: CANCELLED
Start: 2018-02-19

## 2018-02-05 RX ORDER — DIPHENHYDRAMINE HCL 25 MG
50 CAPSULE ORAL ONCE
Status: CANCELLED
Start: 2018-02-12

## 2018-02-05 RX ORDER — HEPARIN SODIUM (PORCINE) LOCK FLUSH IV SOLN 100 UNIT/ML 100 UNIT/ML
500 SOLUTION INTRAVENOUS ONCE
Status: CANCELLED
Start: 2018-02-05 | End: 2018-02-05

## 2018-02-05 RX ORDER — ALBUTEROL SULFATE 90 UG/1
1-2 AEROSOL, METERED RESPIRATORY (INHALATION)
Status: CANCELLED
Start: 2018-02-05

## 2018-02-05 RX ORDER — METHYLPREDNISOLONE SODIUM SUCCINATE 125 MG/2ML
125 INJECTION, POWDER, LYOPHILIZED, FOR SOLUTION INTRAMUSCULAR; INTRAVENOUS
Status: CANCELLED
Start: 2018-02-05

## 2018-02-05 RX ORDER — ALBUTEROL SULFATE 0.83 MG/ML
2.5 SOLUTION RESPIRATORY (INHALATION)
Status: CANCELLED | OUTPATIENT
Start: 2018-02-19

## 2018-02-05 RX ORDER — EPINEPHRINE 0.3 MG/.3ML
0.3 INJECTION SUBCUTANEOUS EVERY 5 MIN PRN
Status: CANCELLED | OUTPATIENT
Start: 2018-02-19

## 2018-02-05 RX ORDER — HEPARIN SODIUM (PORCINE) LOCK FLUSH IV SOLN 100 UNIT/ML 100 UNIT/ML
500 SOLUTION INTRAVENOUS ONCE
Status: CANCELLED
Start: 2018-02-12 | End: 2018-02-12

## 2018-02-05 RX ORDER — MEPERIDINE HYDROCHLORIDE 25 MG/ML
25 INJECTION INTRAMUSCULAR; INTRAVENOUS; SUBCUTANEOUS EVERY 30 MIN PRN
Status: CANCELLED | OUTPATIENT
Start: 2018-02-05

## 2018-02-05 RX ORDER — ZOLPIDEM TARTRATE 10 MG/1
10 TABLET ORAL
Qty: 60 TABLET | Refills: 1 | Status: SHIPPED | OUTPATIENT
Start: 2018-02-05 | End: 2018-03-06

## 2018-02-05 RX ORDER — ALBUTEROL SULFATE 0.83 MG/ML
2.5 SOLUTION RESPIRATORY (INHALATION)
Status: CANCELLED | OUTPATIENT
Start: 2018-02-05

## 2018-02-05 RX ORDER — SODIUM CHLORIDE 9 MG/ML
1000 INJECTION, SOLUTION INTRAVENOUS CONTINUOUS PRN
Status: CANCELLED
Start: 2018-02-05

## 2018-02-05 RX ORDER — DIPHENHYDRAMINE HYDROCHLORIDE 50 MG/ML
50 INJECTION INTRAMUSCULAR; INTRAVENOUS
Status: CANCELLED
Start: 2018-02-12

## 2018-02-05 RX ORDER — EPINEPHRINE 1 MG/ML
0.3 INJECTION, SOLUTION, CONCENTRATE INTRAVENOUS EVERY 5 MIN PRN
Status: CANCELLED | OUTPATIENT
Start: 2018-02-05

## 2018-02-05 RX ORDER — SODIUM CHLORIDE 9 MG/ML
1000 INJECTION, SOLUTION INTRAVENOUS CONTINUOUS PRN
Status: CANCELLED
Start: 2018-02-12

## 2018-02-05 RX ORDER — DIPHENHYDRAMINE HCL 25 MG
50 CAPSULE ORAL ONCE
Status: COMPLETED | OUTPATIENT
Start: 2018-02-05 | End: 2018-02-05

## 2018-02-05 RX ORDER — HEPARIN SODIUM (PORCINE) LOCK FLUSH IV SOLN 100 UNIT/ML 100 UNIT/ML
500 SOLUTION INTRAVENOUS ONCE
Status: CANCELLED
Start: 2018-02-19 | End: 2018-02-19

## 2018-02-05 RX ORDER — ZOLPIDEM TARTRATE 10 MG/1
10 TABLET ORAL
Qty: 60 TABLET | Refills: 1 | Status: SHIPPED | OUTPATIENT
Start: 2018-02-05 | End: 2018-02-05

## 2018-02-05 RX ORDER — LORAZEPAM 2 MG/ML
0.5 INJECTION INTRAMUSCULAR EVERY 4 HOURS PRN
Status: CANCELLED
Start: 2018-02-05

## 2018-02-05 RX ORDER — EPINEPHRINE 0.3 MG/.3ML
0.3 INJECTION SUBCUTANEOUS EVERY 5 MIN PRN
Status: CANCELLED | OUTPATIENT
Start: 2018-02-12

## 2018-02-05 RX ORDER — METHYLPREDNISOLONE SODIUM SUCCINATE 125 MG/2ML
125 INJECTION, POWDER, LYOPHILIZED, FOR SOLUTION INTRAMUSCULAR; INTRAVENOUS
Status: CANCELLED
Start: 2018-02-12

## 2018-02-05 RX ORDER — HEPARIN SODIUM (PORCINE) LOCK FLUSH IV SOLN 100 UNIT/ML 100 UNIT/ML
5 SOLUTION INTRAVENOUS EVERY 8 HOURS PRN
Status: DISCONTINUED | OUTPATIENT
Start: 2018-02-05 | End: 2018-02-05 | Stop reason: HOSPADM

## 2018-02-05 RX ORDER — MEPERIDINE HYDROCHLORIDE 25 MG/ML
25 INJECTION INTRAMUSCULAR; INTRAVENOUS; SUBCUTANEOUS EVERY 30 MIN PRN
Status: CANCELLED | OUTPATIENT
Start: 2018-02-12

## 2018-02-05 RX ORDER — ALBUTEROL SULFATE 90 UG/1
1-2 AEROSOL, METERED RESPIRATORY (INHALATION)
Status: CANCELLED
Start: 2018-02-12

## 2018-02-05 RX ORDER — EPINEPHRINE 0.3 MG/.3ML
0.3 INJECTION SUBCUTANEOUS EVERY 5 MIN PRN
Status: CANCELLED | OUTPATIENT
Start: 2018-02-05

## 2018-02-05 RX ADMIN — SODIUM CHLORIDE, PRESERVATIVE FREE 5 ML: 5 INJECTION INTRAVENOUS at 09:48

## 2018-02-05 RX ADMIN — SODIUM CHLORIDE, PRESERVATIVE FREE 500 UNITS: 5 INJECTION INTRAVENOUS at 14:59

## 2018-02-05 RX ADMIN — SODIUM CHLORIDE 1300 MG: 9 INJECTION, SOLUTION INTRAVENOUS at 13:53

## 2018-02-05 RX ADMIN — DIPHENHYDRAMINE HYDROCHLORIDE 50 MG: 25 CAPSULE ORAL at 13:02

## 2018-02-05 NOTE — PROGRESS NOTES
Infusion Nursing Note:  Reuben Padilla presents today for Day 1 Cycle 2 Cyramza.    Patient seen by provider today: Yes: SERGIO Barker and SERGIO Mcgovern saw patient prior to infusion   present during visit today: Not Applicable.    Note:   TORB SERGIO Mcgovern/Bozena Chu RN at 1150 on 2/5/18  Given only Cyramza today  OK to proceed with ANC of 0.9    TORB SERGIO Barker/Bozena Chu RN at 1300 on 2/5/18  Patient does not need to collect 24 hour urine.    Intravenous Access:  Implanted Port.    Treatment Conditions:  Lab Results   Component Value Date    HGB 11.4 02/05/2018     Lab Results   Component Value Date    WBC 1.9 02/05/2018      Lab Results   Component Value Date    ANEU 0.9 02/05/2018     Lab Results   Component Value Date     02/05/2018                Lab Results   Component Value Date    BILITOTAL 0.6 02/05/2018           Lab Results   Component Value Date    ALBUMIN 3.3 02/05/2018                    Lab Results   Component Value Date    ALT 20 02/05/2018           Lab Results   Component Value Date    AST 18 02/05/2018       Results reviewed, labs did NOT meet treatment parameters: ANC 0.9, see TORB.  Urine protein: 30      Post Infusion Assessment:  Patient tolerated infusion without incident.  Blood return noted pre and post infusion.  Site patent and intact, free from redness, edema or discomfort.  No evidence of extravasations.  Access discontinued per protocol.    Discharge Plan:   Prescription refills given for Ambien.  Discharge instructions reviewed with: Patient and Family.  Patient and/or family verbalized understanding of discharge instructions and all questions answered.  Copy of AVS reviewed with patient and/or family.  Patient will return 2/12/18 for next appointment.  Patient discharged in stable condition accompanied by: sister.  Departure Mode: Ambulatory.    Bozena Chu RN

## 2018-02-05 NOTE — PATIENT INSTRUCTIONS
Contact Numbers  [facility]: [phone]  Triage: [phone] (Monday-Friday 8-4:30)  After Hours:  [phone]    Please call the [facility] Triage line if you experience a temperature greater than or equal to 100.4, shaking chills, have uncontrolled nausea, vomiting and/or diarrhea, dizziness, shortness of breath, chest pain, bleeding, unexplained bruising, or if you have any other new/concerning symptoms, questions or concerns.     If it is after hours, weekends, or holidays, please call [phone] to speak to someone regarding your questions or concerns.    If you are having any concerning symptoms or wish to speak to a provider before your next infusion visit, please call your care coordinator or triage to notify them so we can adequately serve you.     If you need a refill on a narcotic prescription or other medication, please call triage before your infusion appointment.             February 2018 Sunday Monday Tuesday Wednesday Thursday Friday Saturday                       1     2     3       4     5     [facility] LAB DRAW    9:30 AM   (15 min.)    [facility] LAB DRAW   [facility] Lab Draw     [facility] RETURN    9:55 AM   (50 min.)   Loraine Sutherland PA-C [facility] ONC INFUSION 180   11:00 AM   (180 min.)    ONCOLOGY INFUSION   [facility] 6     [facility] RETURN WITH ROOM    7:45 AM   (60 min.)   Dao Lacy PsyD   [facility] 7     8     9     10       11     12     P [facility] DRAW    8:00 AM   (15 min.)    [facility]     [facility] ONC INFUSION 120    8:30 AM   (120 min.)    ONCOLOGY INFUSION   [facility] 13     14     15     16     17       18     19     [facility] DRAW   11:15 AM   (15 min.)    [facility]     [facility] RETURN   11:35 AM   (50 min.)   [facility]  180   12:30 PM   (180 min.)    ONCOLOGY INFUSION   [facility] 20     21     22     23     24       25     26     27     28 March 2018 Sunday Monday Tuesday Wednesday Thursday Friday Saturday                       1     2     3       4     5     6     [facility] DRAW    9:30 AM   (15 min.)    [facility] LAB DRAW   [facility] Lab Draw     CT CHEST/ABDOMEN/PELVIS W    9:45 AM   (20 min.)   UCCT2   [facility] CT     [facility] RETURN   12:15 PM   (30 min.)   Kadi Dee MD   [facility] 7     8     9     10       11     12     13     14     15     16     17       18     19     20     21     22     23     24       25     26     27     28     29     30     31                  Lab Results:  Recent Results (from the past 12 hour(s))   CBC with platelets differential    Collection Time: 02/05/18  9:54 AM   Result Value Ref Range    WBC 1.9 (L) 4.0 - 11.0 10e9/L    RBC Count 3.79 (L) 4.4 - 5.9 10e12/L    Hemoglobin 11.4 (L) 13.3 - 17.7 g/dL    Hematocrit 35.7 (L) 40.0 - 53.0 %    MCV 94 78 - 100 fl    MCH 30.1 26.5 - 33.0 pg    MCHC 31.9 31.5 - 36.5 g/dL    RDW 14.0 10.0 - 15.0 %    Platelet Count 124 (L) 150 - 450 10e9/L    Diff Method Automated Method     % Neutrophils 44.6 %    % Lymphocytes 38.2 %    % Monocytes 15.2 %    % Eosinophils 0.5 %    % Basophils 1.0 %    % Immature Granulocytes 0.5 %    Nucleated RBCs 0 0 /100    Absolute Neutrophil 0.9 (L) 1.6 - 8.3 10e9/L    Absolute Lymphocytes 0.7 (L) 0.8 - 5.3 10e9/L    Absolute Monocytes 0.3 0.0 - 1.3 10e9/L    Absolute Eosinophils 0.0 0.0 - 0.7 10e9/L    Absolute Basophils 0.0 0.0 - 0.2 10e9/L    Abs Immature Granulocytes 0.0 0 - 0.4 10e9/L    Absolute Nucleated RBC 0.0    Hepatic panel    Collection Time: 02/05/18  9:54 AM   Result Value Ref Range    Bilirubin Direct 0.2 0.0 - 0.2 mg/dL    Bilirubin Total 0.6 0.2 - 1.3 mg/dL    Albumin 3.3 (L) 3.4 - 5.0 g/dL    Protein Total  7.2 6.8 - 8.8 g/dL    Alkaline Phosphatase 84 40 - 150 U/L    ALT 20 0 - 70 U/L    AST 18 0 - 45 U/L   Protein qualitative urine    Collection Time: 02/05/18 12:15 PM   Result Value Ref Range    Protein Albumin Urine 30 (A) NEG^Negative mg/dL

## 2018-02-05 NOTE — MR AVS SNAPSHOT
After Visit Summary   2/5/2018    Reuben Padilla    MRN: [id number]           Patient Information     Date Of Birth          1960        Visit Information        Provider Department      2/5/2018 11:00 AM  24 ATC;  ONCOLOGY INFUSION [facility]        Today's Diagnoses     Cancer of distal third of esophagus (H)    -  1      Care Instructions    Contact Numbers  [facility]: [phone]  Triage: [phone] (Monday-Friday 8-4:30)  After Hours:  [phone]    Please call the [facility] Triage line if you experience a temperature greater than or equal to 100.4, shaking chills, have uncontrolled nausea, vomiting and/or diarrhea, dizziness, shortness of breath, chest pain, bleeding, unexplained bruising, or if you have any other new/concerning symptoms, questions or concerns.     If it is after hours, weekends, or holidays, please call [phone] to speak to someone regarding your questions or concerns.    If you are having any concerning symptoms or wish to speak to a provider before your next infusion visit, please call your care coordinator or triage to notify them so we can adequately serve you.     If you need a refill on a narcotic prescription or other medication, please call triage before your infusion appointment.             February 2018 Sunday Monday Tuesday Wednesday Thursday Friday Saturday                       1     2     3       4     5     [facility] MASONIC LAB DRAW    9:30 AM   (15 min.)    MASONIC LAB DRAW   [facility] Lab Draw     [facility] RETURN    9:55 AM   (50 min.)   Loraine Sutherland PA-C   M [facility]     [facility] ONC INFUSION 180   11:00 AM   (180 min.)    ONCOLOGY INFUSION   [facility] 6     [facility] RETURN WITH ROOM    7:45 AM   (60 min.)   Dao Lacy PsyD   M [facility] 7     8     9     10       11     12     [facility] MASONIC LAB DRAW    8:00 AM   (15 min.)   [facility] LAB DRAW   M  [facility] Masonic Lab Draw     [facility] ONC INFUSION 120    8:30 AM   (120 min.)   [facility] ONCOLOGY INFUSION   [facility] 13     14     15     16     17       18     19     [facility] MASONIC LAB DRAW   11:15 AM   (15 min.)   [facility]IC LAB DRAW   [facility] Lab Draw     UMP RETURN   11:35 AM   (50 min.)   Loraine Sutherland PA-C   [facility]     [facility] ONC INFUSION 180   12:30 PM   (180 min.)   UC ONCOLOGY INFUSION   [facility] 20     21     22     23     24       25     26     27     28 March 2018 Sunday Monday Tuesday Wednesday Thursday Friday Saturday                       1     2     3       4     5     6     [facility] LAB DRAW    9:30 AM   (15 min.)    MASONIC LAB DRAW   [facility] Lab Draw     CT CHEST/ABDOMEN/PELVIS W    9:45 AM   (20 min.)   UCCT2   [facility] CT     UMP RETURN   12:15 PM   (30 min.)   Kadi Dee MD   [facility] 7     8     9     10       11     12     13     14     15     16     17       18     19     20     21     22     23     24       25     26     27     28     29     30     31                  Lab Results:  Recent Results (from the past 12 hour(s))   CBC with platelets differential    Collection Time: 02/05/18  9:54 AM   Result Value Ref Range    WBC 1.9 (L) 4.0 - 11.0 10e9/L    RBC Count 3.79 (L) 4.4 - 5.9 10e12/L    Hemoglobin 11.4 (L) 13.3 - 17.7 g/dL    Hematocrit 35.7 (L) 40.0 - 53.0 %    MCV 94 78 - 100 fl    MCH 30.1 26.5 - 33.0 pg    MCHC 31.9 31.5 - 36.5 g/dL    RDW 14.0 10.0 - 15.0 %    Platelet Count 124 (L) 150 - 450 10e9/L    Diff Method Automated Method     % Neutrophils 44.6 %    % Lymphocytes 38.2 %    % Monocytes 15.2 %    % Eosinophils 0.5 %    % Basophils 1.0 %    % Immature Granulocytes 0.5 %    Nucleated RBCs 0 0 /100    Absolute Neutrophil 0.9 (L) 1.6 - 8.3 10e9/L    Absolute Lymphocytes 0.7 (L) 0.8 - 5.3 10e9/L    Absolute Monocytes 0.3  0.0 - 1.3 10e9/L    Absolute Eosinophils 0.0 0.0 - 0.7 10e9/L    Absolute Basophils 0.0 0.0 - 0.2 10e9/L    Abs Immature Granulocytes 0.0 0 - 0.4 10e9/L    Absolute Nucleated RBC 0.0    Hepatic panel    Collection Time: 02/05/18  9:54 AM   Result Value Ref Range    Bilirubin Direct 0.2 0.0 - 0.2 mg/dL    Bilirubin Total 0.6 0.2 - 1.3 mg/dL    Albumin 3.3 (L) 3.4 - 5.0 g/dL    Protein Total 7.2 6.8 - 8.8 g/dL    Alkaline Phosphatase 84 40 - 150 U/L    ALT 20 0 - 70 U/L    AST 18 0 - 45 U/L   Protein qualitative urine    Collection Time: 02/05/18 12:15 PM   Result Value Ref Range    Protein Albumin Urine 30 (A) NEG^Negative mg/dL              Follow-ups after your visit        Your next 10 appointments already scheduled     Feb 06, 2018  8:00 AM CST   (Arrive by 7:45 AM)   RETURN WITH ROOM with Dao Lacy PsyD,  2 119 CONSULT RM   [facility])    [address] [place] [address]  [address]  [facility] [address]   [phone]            Feb 12, 2018  8:00 AM CST   Masonic Lab Draw with  MASONIC LAB DRAW   [facility] Lab Draw ([facility])    [address] [place] [address]  [address]  [facility] [address]   [phone]            Feb 12, 2018  8:30 AM CST   Infusion 120 with  ONCOLOGY INFUSION,  19 ATC   [facility] ([facility])    [address] [place] [address]  [address]  [facility] [address]   [phone]            Feb 19, 2018 11:15 AM CST   Masonic Lab Draw with  MASONIC LAB DRAW   [facility] Lab Draw ([facility])    [address] [place] [address]  [address]  [facility] [address]   [phone]            Feb 19, 2018 11:50 AM CST   (Arrive by 11:35 AM)   Return Visit with Loraine Sutherland PA-C   [facility] ([facility])    [address]  [address]  [facility] [address] [phone]            Feb  19, 2018 12:30 PM CST   Infusion 180 with UC ONCOLOGY INFUSION, UC 30 ATC   [facility] ([facility])    [address]  [address]  [facility] [address] [phone]            Mar 06, 2018  9:30 AM CST   Masonic Lab Draw with UC MASONIC LAB DRAW   [facility] Lab Draw ([facility])    [address]  [address]  [facility] [address] [phone]              Who to contact     If you have questions or need follow up information about today's clinic visit or your schedule please contact [facility] directly at [phone].  Normal or non-critical lab and imaging results will be communicated to you by [employer], letter or phone within 4 business days after the clinic has received the results. If you do not hear from us within 7 days, please contact the clinic through [employer] or phone. If you have a critical or abnormal lab result, we will notify you by phone as soon as possible.  Submit refill requests through [employer] or call your pharmacy and they will forward the refill request to us. Please allow 3 business days for your refill to be completed.          Additional Information About Your Visit        [employer] Information     [employer] gives you secure access to your electronic health record. If you see a primary care provider, you can also send messages to your care team and make appointments. If you have questions, please call your primary care clinic.  If you do not have a primary care provider, please call [phone] and they will assist you.        Care EveryWhere ID     This is your Care EveryWhere ID. This could be used by other organizations to access your [place] medical records  [id number]         Blood Pressure from Last 3 Encounters:   02/05/18 126/72   01/22/18 111/75   01/15/18 123/84    Weight from Last 3 Encounters:   02/05/18 (!) 164.8 kg (363 lb 4.8 oz)   01/22/18 (!) 161.3 kg (355  lb 9.6 oz)   01/08/18 (!) 163.3 kg (360 lb)              We Performed the Following     CBC with platelets differential     Hepatic panel     Protein qualitative urine          Today's Medication Changes          These changes are accurate as of 2/5/18  3:11 PM.  If you have any questions, ask your nurse or doctor.               Start taking these medicines.        Dose/Directions    zolpidem 10 MG tablet   Commonly known as:  AMBIEN   Used for:  Metastasis from gastric cancer (H)   Started by:  Loraine Sutherland PA-C        Dose:  10 mg   Take 1 tablet (10 mg) by mouth nightly as needed   Quantity:  60 tablet   Refills:  1            Where to get your medicines      Some of these will need a paper prescription and others can be bought over the counter.  Ask your nurse if you have questions.     Bring a paper prescription for each of these medications     zolpidem 10 MG tablet                Primary Care Provider Fax #    Physician No Ref-Primary [phone]       No address on file        Equal Access to Services     OLLIE SHARPE : Eliza Dunlap, waaxjuancarlos luangelita, qaybta kaalmada adejames, geraldo washington . So [facility] [phone].    ATENCIÓN: Si habla español, tiene a alamo disposición servicios gratuitos de asistencia lingüística. Llame al [phone].    We comply with applicable federal civil rights laws and [place] laws. We do not discriminate on the basis of race, color, national origin, age, disability, sex, sexual orientation, or gender identity.            Thank you!     Thank you for choosing [facility]  for your care. Our goal is always to provide you with excellent care. Hearing back from our patients is one way we can continue to improve our services. Please take a few minutes to complete the written survey that you may receive in the mail after your visit with us. Thank you!             Your Updated Medication List - Protect others around  you: Learn how to safely use, store and throw away your medicines at www.disposemymeds.org.          This list is accurate as of 2/5/18  3:11 PM.  Always use your most recent med list.                   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)       LORazepam 0.5 MG tablet    ATIVAN    30 tablet    Take 1 tablet (0.5 mg) by mouth every 4 hours as needed (Anxiety, Nausea/Vomiting or Sleep)    Cancer of distal third of esophagus (H)       Multi-vitamin Tabs tablet      Take 1 tablet by mouth daily        ondansetron 8 MG tablet    ZOFRAN    30 tablet    Take 1 tablet (8 mg) by mouth every 8 hours as needed (Nausea/Vomiting)    Cancer of distal third of esophagus (H)       prochlorperazine 10 MG tablet    COMPAZINE    30 tablet    Take 1 tablet (10 mg) by mouth every 6 hours as needed (Nausea/Vomiting)    Cancer of distal third of esophagus (H)       timolol 0.5 % ophthalmic solution    TIMOPTIC     Place into both eyes daily    Cancer of distal third of esophagus (H)       zolpidem 10 MG tablet    AMBIEN    60 tablet    Take 1 tablet (10 mg) by mouth nightly as needed    Metastasis from gastric cancer (H)

## 2018-02-05 NOTE — MR AVS SNAPSHOT
After Visit Summary   2/5/2018    Reuben Padilla    MRN: [id number]           Patient Information     Date Of Birth          1960        Visit Information        Provider Department      2/5/2018 10:10 AM Loraine Sutherland PA-C [facility]        Today's Diagnoses     Metastasis from gastric cancer (H)        Cancer of distal third of esophagus (H)           Follow-ups after your visit        Your next 10 appointments already scheduled     Feb 06, 2018  8:00 AM CST   (Arrive by 7:45 AM)   RETURN WITH ROOM with Dao Lacy PsyD,  2 119 CONSULT [facility] ([facility])    [address] [place] [address]  [address]  [facility] [address]   [phone]            Feb 12, 2018  8:00 AM CST   Masonic Lab Draw with  MASONIC LAB DRAW   [facility] ([facility])    [address] [place] [address]  [address]  [facility] [address]   [phone]            Feb 12, 2018  8:30 AM CST   Infusion 120 with UC ONCOLOGY INFUSION, UC 19 ATC   [facility] ([facility])    [address] [place] [address]  [address]  [facility] [address]   [phone]            Feb 19, 2018 11:15 AM CST   Masonic Lab Draw with  MASONIC LAB DRAW   [facility] ([facility])    [address] [place] [address]  [address]  [facility] [address]   [phone]            Feb 19, 2018 11:50 AM CST   (Arrive by 11:35 AM)   Return Visit with Loraine Sutherland PA-C   [facility] ([facility])    [address] [place] [address]  [address]  [facility] [address]   [phone]            Feb 19, 2018 12:30 PM CST   Infusion 180 with UC ONCOLOGY INFUSION, UC 30 ATC   [facility] ([facility])    [address] [place] [address]  [address]  [facility] [address]   [phone]            Mar 06, 2018   9:30 AM [facility]   Masonic Lab Draw with  MASONIC LAB DRAW   [facility] Lab Draw ([facility])    [address]  [address]  [facility] [address] [phone]              Who to contact     If you have questions or need follow up information about today's clinic visit or your schedule please contact [facility] directly at [phone].  Normal or non-critical lab and imaging results will be communicated to you by [employer], letter or phone within 4 business days after the clinic has received the results. If you do not hear from us within 7 days, please contact the clinic through [employer] or phone. If you have a critical or abnormal lab result, we will notify you by phone as soon as possible.  Submit refill requests through [employer] or call your pharmacy and they will forward the refill request to us. Please allow 3 business days for your refill to be completed.          Additional Information About Your Visit        [employer] Information     [employer] gives you secure access to your electronic health record. If you see a primary care provider, you can also send messages to your care team and make appointments. If you have questions, please call your primary care clinic.  If you do not have a primary care provider, please call [phone] and they will assist you.        Care EveryWhere ID     This is your Care EveryWhere ID. This could be used by other organizations to access your [place] medical records  [id number]        Your Vitals Were     Pulse Temperature Respirations Pulse Oximetry BMI (Body Mass Index)       95 97.9  F (36.6  C) (Oral) 18 96% 41.99 kg/m2        Blood Pressure from Last 3 Encounters:   02/05/18 126/72   01/22/18 111/75   01/15/18 123/84    Weight from Last 3 Encounters:   02/05/18 (!) 164.8 kg (363 lb 4.8 oz)   01/22/18 (!) 161.3 kg (355 lb 9.6 oz)   01/08/18 (!) 163.3 kg (360 lb)              Today, you had the following     No orders  found for display         Today's Medication Changes          These changes are accurate as of 2/5/18  6:04 PM.  If you have any questions, ask your nurse or doctor.               Start taking these medicines.        Dose/Directions    zolpidem 10 MG tablet   Commonly known as:  AMBIEN   Used for:  Metastasis from gastric cancer (H)   Started by:  Loraine Sutherland PA-C        Dose:  10 mg   Take 1 tablet (10 mg) by mouth nightly as needed   Quantity:  60 tablet   Refills:  1            Where to get your medicines      Some of these will need a paper prescription and others can be bought over the counter.  Ask your nurse if you have questions.     Bring a paper prescription for each of these medications     zolpidem 10 MG tablet                Primary Care Provider Fax #    Physician No Ref-Primary [phone]       No address on file        Equal Access to Services     OLLIE SHARPE : Eliza Dunlap, waomer davis, qaybta kaalmajuancarlos sotelo, geralod washington . So [facility] [phone].    ATENCIÓN: Si habla español, tiene a alamo disposición servicios gratuitos de asistencia lingüística. Llame al [phone].    We comply with applicable federal civil rights laws and [place] laws. We do not discriminate on the basis of race, color, national origin, age, disability, sex, sexual orientation, or gender identity.            Thank you!     Thank you for choosing [facility]  for your care. Our goal is always to provide you with excellent care. Hearing back from our patients is one way we can continue to improve our services. Please take a few minutes to complete the written survey that you may receive in the mail after your visit with us. Thank you!             Your Updated Medication List - Protect others around you: Learn how to safely use, store and throw away your medicines at www.disposemymeds.org.          This list is accurate as of 2/5/18  6:04 PM.   Always use your most recent med list.                   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)       LORazepam 0.5 MG tablet    ATIVAN    30 tablet    Take 1 tablet (0.5 mg) by mouth every 4 hours as needed (Anxiety, Nausea/Vomiting or Sleep)    Cancer of distal third of esophagus (H)       Multi-vitamin Tabs tablet      Take 1 tablet by mouth daily        ondansetron 8 MG tablet    ZOFRAN    30 tablet    Take 1 tablet (8 mg) by mouth every 8 hours as needed (Nausea/Vomiting)    Cancer of distal third of esophagus (H)       prochlorperazine 10 MG tablet    COMPAZINE    30 tablet    Take 1 tablet (10 mg) by mouth every 6 hours as needed (Nausea/Vomiting)    Cancer of distal third of esophagus (H)       timolol 0.5 % ophthalmic solution    TIMOPTIC     Place into both eyes daily    Cancer of distal third of esophagus (H)       zolpidem 10 MG tablet    AMBIEN    60 tablet    Take 1 tablet (10 mg) by mouth nightly as needed    Metastasis from gastric cancer (H)

## 2018-02-05 NOTE — NURSING NOTE
"Oncology Rooming Note    February 5, 2018 10:38 AM   Reuben Padilla is a 57 year old male who presents for:    Chief Complaint   Patient presents with     Port Draw     Labs drawn via port by RN. Line flushed and hep locked. VS taken.     Oncology Clinic Visit     Return for Esophageal Ca , Pre chemo visit     Initial Vitals: /72 (BP Location: Right arm, Patient Position: Sitting, Cuff Size: Adult Large)  Pulse 95  Temp 97.9  F (36.6  C) (Oral)  Resp 18  Wt (!) 164.8 kg (363 lb 4.8 oz)  SpO2 96%  BMI 41.99 kg/m2 Estimated body mass index is 41.99 kg/(m^2) as calculated from the following:    Height as of 1/8/18: 1.981 m (6' 5.99\").    Weight as of this encounter: 164.8 kg (363 lb 4.8 oz). Body surface area is 3.01 meters squared.  Data Unavailable Comment: Data Unavailable   No LMP for male patient.  Allergies reviewed: Yes  Medications reviewed: Yes    Medications: Medication refills not needed today.  Pharmacy name entered into EPIC:  Stevan Mahoney   [facility]/PHARMACY #[id number] - [place] - [address] [place] [address] AT INTERSECTION 109[facility] [place], [place] - [address] [place] [address] [phone]  [facility] [address] - [place], [place] - [address] [place] [address]    Clinical concerns: Tx  Ena was notified.    6  minutes for nursing intake (face to face time)     Jess Loredo MA              "

## 2018-02-05 NOTE — Clinical Note
2/5/2018       RE: Reuben Padilla  [address] [facility] [address]     Dear Colleague,    Thank you for referring your patient, Reuben Padilla, to the [facility]. Please see a copy of my visit note below.    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 His tumor is positive for cytokeratin 20, negative for P63 and CK7, and HER2 is negative. He started on FOLFOX (5FU/oxaliplatin) on 5/15/2017. He had a delay in treatment from 9/5-10/2/17 due to work related injury. He had an excellent response by imaging throughout the summer 2017. He had progression of disease by PET/CT in 12/2017.  He underwent biopsy for Foundation One testing which is currently pending.  Plan was changed to Taxol and Cymramza, although Cyramza on hold for 2 weeks due to recent PE. Please see previous notes for further details on the patient's history. He was in lab today prior to infusion appointment when he had syncopal event.    Reuben was seen here last week on 1/8/18 as an add-on due to syncopal event.  Most likely vasovagal reaction since he had had previous syncopal episodes with blood draws. Please see note for full details.  His chemotherapy was continued and he is here today for cycle 1, day 15 of Taxol/Cyramza.     Interval History: *** Reuben presents today alone. He is frustrated about the lack of communication that has been ongoing throughout.  He understands different schedules, however he is hoping to see the same BERENICE every time, if possible.  He also hopes to have better communication regarding his upcoming appointments and any changes.    He states he has been having diarrhea since starting Taxol on day 3 typically getting worse over the next  couple days and then improving.  He admits to having an episode of diarrhea this morning with occasional bright red blood, however this is not explosive.  He admits it is a little mushy and not watery.  He has not been on any recent antibiotics or traveling outside the country.  He states he tried Imodium however did not help.  He admits to only taking 2-3 tablets per day.  He has been eating and drinking normally and states he has been taking an greater than 64 ounces of fluids per day.  He denies any fever or chills (although he states that he is overall colder than normal since starting FOLFOX chemotherapy, which is unchanged today).  He admits to ongoing peripheral neuropathy from FOLFOX and is slowly improved as well, however he has noted some minimal neuropathy on all 5 fingertips since his  Taxol infusion last week.  This does not affect his function at all.  He has chronic toe neuropathy from FOLFOX and that is not getting worse.  He denies any urinary changes, bleeding, constipation, vomiting.  Denies chest pain, shortness of breath, sore throat, cough, abdominal pain, skin changes.    Reuben admits that questions about pain seem to really bother him.  He is concerned that if he does not have any pain, that the chemotherapy may not be working.  He is requesting that questions regarding pain not be asked of him during his clinic visits.  These questions make him emotional and he is concerned about the unknown    Review of Systems:  Patient denies any of the following except if noted above: fevers, chills, nausea, vomiting, headaches, numbness, tingling, bleeding or bruising issues.    Current Outpatient Prescriptions   Medication Sig Dispense Refill     LORazepam (ATIVAN) 0.5 MG tablet Take 1 tablet (0.5 mg) by mouth every 4 hours as needed (Anxiety, Nausea/Vomiting or Sleep) (Patient not taking: Reported on 1/15/2018) 30 tablet 2     prochlorperazine (COMPAZINE) 10 MG tablet Take 1 tablet (10 mg) by mouth  every 6 hours as needed (Nausea/Vomiting) (Patient not taking: Reported on 1/22/2018) 30 tablet 2     enoxaparin (LOVENOX) 80 MG/0.8ML injection Inject 1.3 mLs (130 mg) Subcutaneous 2 times daily 60 Syringe 3     IBUPROFEN PO Take 600 mg by mouth every 4 hours as needed for moderate pain       [DISCONTINUED] dexamethasone (DECADRON) 4 MG tablet Take two tablets daily on days 2,3 and then 1 tablet daily days 4,5 6 tablet 1     zolpidem (AMBIEN) 10 MG tablet Take 1 tablet (10 mg) by mouth nightly as needed 60 tablet 1     ondansetron (ZOFRAN) 8 MG tablet Take 1 tablet (8 mg) by mouth every 8 hours as needed (Nausea/Vomiting) 30 tablet 2     lidocaine-prilocaine (EMLA) cream Apply topically as needed for moderate pain 30 g 3     multivitamin, therapeutic with minerals (MULTI-VITAMIN) TABS tablet Take 1 tablet by mouth daily       fish oil-omega-3 fatty acids 1000 MG capsule Take 2 g by mouth daily       timolol (TIMOPTIC) 0.5 % ophthalmic solution Place into both eyes daily       Physical Examination:  General: The patient is a pleasant male, seated in infusion chair, in no acute distress.   There were no vitals taken for this visit.  ***  Wt Readings from Last 10 Encounters:   01/22/18 (!) 161.3 kg (355 lb 9.6 oz)   01/08/18 (!) 163.3 kg (360 lb)   12/27/17 (!) 158.8 kg (350 lb)   12/12/17 (!) 159.2 kg (351 lb)   12/11/17 (!) 159.9 kg (352 lb 9.6 oz)   11/28/17 (!) 163.1 kg (359 lb 9.6 oz)   11/13/17 (!) 163.8 kg (361 lb 3.2 oz)   10/30/17 (!) 159.3 kg (351 lb 4.8 oz)   10/17/17 (!) 162.2 kg (357 lb 9.6 oz)   10/02/17 (!) 160.1 kg (353 lb)     HEENT: EOMI, PERRL. Sclerae are anicteric.   Heart: Irregular rate and rhythm.   Lungs: Clear to auscultation bilaterally.   Extremities: Hyperpigmentation of right leg. Right leg a little more swollen than left but patient states this is chronic and unchanged.  Neuro: grossly non-focal  Skin: No rashes, petechiae. Bruising noted on abdomen. Psych: he is tearful  today    Laboratory Data:  ***    Assessment and Plan:  1. Metastatic esophogeal cancer.  He had progression of disease by PET/CT in 12/2017.  He underwent biopsy for Foundation One testing which is currently pending and spoke to care coordinator to help assist with obtaining results.  Plan was changed to Taxol and Cymramza, although Cyramza was on hold for 2 weeks due to recent PE.  He has tolerated Taxol fairly well.  WBC 2.6 and ANC 1.1, likely from chemotherapy and although below treatment parameters, patient is feeling extremely well and will proceed with Taxol and first dose of Cyramza today (cycle 1, day 15).   --2/5 Nicole Sutherland PA-C, Taxol/Cyramza C2D1  --2/12 Taxol/Cyramza C2D8  --2/19 Nicole Sutherland PA-C, Taxol/Cyramza C2D15  --3/6 CTcap, Dr Dee    2. Syncope: Occurred during blood draw last week.  Workup unremarkable and proceeded with chemotherapy.  No symptoms during blood draw today and has been asymptomatic since this episode.  Vital signs stable today.  Will continue to monitor.     3. Diarrhea: Likely from Taxol and would recommend patient increasing the number of tablets of Imodium per day.  If no improvement with increase in Imodium, would consider stool culture, however no indication today.  We will continue to monitor.  Electrolytes normal with out evidence of dehydration today.    4. Atrial fibrillation: Evaluated by cardiology on 8/21/17.  Heart rate in clinic today 99 and asymptomatic.  Currently on Lovenox.  --Will refer to cardiology if becomes symptomatic     5. H/o PE: This was originally identified on staging in 07/2017.  He was on Xarelto.  At his restaging evaluation he had progressive symptoms with more clot burden and was started on Lovenox 1 mg/kg twice daily. Hep 10a was 1.02 on 12/26/17. He administered his Lovenox at 5:30 AM today and Hep 10a is pending.    6. Coping: Reuben is not coping well.  He was tearful in clinic today.  Concerned about the lack of communication in  the clinic with his chemotherapy plan and he does not feel his family completely understands his situation.  Would recommend seeing Darrius (therapist), again soon.  Please refrain from asking him about his pain (see interval history).     7. Peripheral neuropathy: Chronic since FOLFOX therapy and slightly increased neuropathy on the fingertips of all 5 fingers.  Grade 1 at this time. Continue to monitor.      Chelo Rogers PA-C    I saw patient and performed the ROS and exam myself.  The assessment and plan were mutually discussed and this note was edited to reflect my findings.  Nicole Sutherland PA-C            Again, thank you for allowing me to participate in the care of your patient.      Sincerely,    Loraine Sutherland PA-C

## 2018-02-05 NOTE — PROGRESS NOTES
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 His tumor is positive for cytokeratin 20, negative for P63 and CK7, and HER2 is negative. He started on FOLFOX (5FU/oxaliplatin) on 5/15/2017. He had a delay in treatment from 9/5-10/2/17 due to work related injury. He had an excellent response by imaging throughout the summer 2017. He had progression of disease by PET/CT in 12/2017.  He underwent biopsy for Foundation One testing which is currently pending.  Plan was changed to Taxol and Cymramza, although Cyramza on hold for 2 weeks due to recent PE. Please see previous notes for further details on the patient's history. He was in lab today prior to infusion appointment when he had syncopal event.    Reuben was seen here last week on 1/8/18 as an add-on due to syncopal event.  Most likely vasovagal reaction since he had had previous syncopal episodes with blood draws. Please see note for full details.  His chemotherapy was continued and he is here today for cycle 2, day 1 of Taxol/Cyramza.     Interval History: Reuben presents with his sister today.  He continues to tolerate Taxol/sinograms really well.  He has previously had syncopal episodes with blood draw and nothing most recently.  He continues to have diarrhea, however significantly improved.  He did increase the Imodium dose and currently only has 1-2 episodes a day, which is improved.  He admits to one episode of bright red blood on his toilet paper states they are soft does not feel constipated.  And that has resolved.  Denies any pain with defecation.  Denies any black or tarry stools.  He continues Lovenox and recently was adjusted since his heparin 10 A level was abnormal.  He has been eating and drinking  normally and states he has been taking an greater than 64 ounces of fluids per day.  He denies any fever or chills (although he states that he is overall colder than normal since starting FOLFOX chemotherapy, which is unchanged today).  He admits to ongoing peripheral neuropathy from FOLFOX and is slowly improved as well, however he has noted some minimal neuropathy on all 5 fingertips since his  Taxol infusion last week.  This does not affect his function at all.  He has chronic toe neuropathy from FOLFOX and that is not getting worse.  He denies any urinary changes, bleeding, constipation, vomiting.  Denies chest pain, shortness of breath, sore throat, cough, abdominal pain, skin changes.    He still questioning the foundation one results.  Unfortunately, they are still in process and patiently waiting.  His sister has further questions regarding this test result as well.    Reuben admits that questions about pain seem to really bother him.  He is concerned that if he does not have any pain, that the chemotherapy may not be working.  He is requesting that questions regarding pain not be asked of him during his clinic visits.  These questions make him emotional and he is concerned about the unknown    Review of Systems:  Patient denies any of the following except if noted above: fevers, chills, nausea, vomiting, headaches, numbness, tingling, bleeding or bruising issues.    Current Outpatient Prescriptions   Medication Sig Dispense Refill     zolpidem (AMBIEN) 10 MG tablet Take 1 tablet (10 mg) by mouth nightly as needed 60 tablet 1     enoxaparin (LOVENOX) 80 MG/0.8ML injection Inject 1.3 mLs (130 mg) Subcutaneous 2 times daily 60 Syringe 3     lidocaine-prilocaine (EMLA) cream Apply topically as needed for moderate pain 30 g 3     fish oil-omega-3 fatty acids 1000 MG capsule Take 2 g by mouth daily       LORazepam (ATIVAN) 0.5 MG tablet Take 1 tablet (0.5 mg) by mouth every 4 hours as needed (Anxiety,  Nausea/Vomiting or Sleep) (Patient not taking: Reported on 1/15/2018) 30 tablet 2     prochlorperazine (COMPAZINE) 10 MG tablet Take 1 tablet (10 mg) by mouth every 6 hours as needed (Nausea/Vomiting) (Patient not taking: Reported on 1/22/2018) 30 tablet 2     IBUPROFEN PO Take 600 mg by mouth every 4 hours as needed for moderate pain       [DISCONTINUED] dexamethasone (DECADRON) 4 MG tablet Take two tablets daily on days 2,3 and then 1 tablet daily days 4,5 6 tablet 1     ondansetron (ZOFRAN) 8 MG tablet Take 1 tablet (8 mg) by mouth every 8 hours as needed (Nausea/Vomiting) (Patient not taking: Reported on 2/5/2018) 30 tablet 2     multivitamin, therapeutic with minerals (MULTI-VITAMIN) TABS tablet Take 1 tablet by mouth daily       timolol (TIMOPTIC) 0.5 % ophthalmic solution Place into both eyes daily       Physical Examination:  General: The patient is a pleasant male, seated in infusion chair, in no acute distress.   /72 (BP Location: Right arm, Patient Position: Sitting, Cuff Size: Adult Large)  Pulse 95  Temp 97.9  F (36.6  C) (Oral)  Resp 18  Wt (!) 164.8 kg (363 lb 4.8 oz)  SpO2 96%  BMI 41.99 kg/m2    Wt Readings from Last 10 Encounters:   02/05/18 (!) 164.8 kg (363 lb 4.8 oz)   01/22/18 (!) 161.3 kg (355 lb 9.6 oz)   01/08/18 (!) 163.3 kg (360 lb)   12/27/17 (!) 158.8 kg (350 lb)   12/12/17 (!) 159.2 kg (351 lb)   12/11/17 (!) 159.9 kg (352 lb 9.6 oz)   11/28/17 (!) 163.1 kg (359 lb 9.6 oz)   11/13/17 (!) 163.8 kg (361 lb 3.2 oz)   10/30/17 (!) 159.3 kg (351 lb 4.8 oz)   10/17/17 (!) 162.2 kg (357 lb 9.6 oz)     HEENT: EOMI, PERRL. Sclerae are anicteric.   Heart: Irregular rate and rhythm.   Lungs: Clear to auscultation bilaterally.   Extremities: Hyperpigmentation of right leg. Right leg a little more swollen than left but patient states this is chronic and unchanged.  Neuro: grossly non-focal  Skin: No rashes, petechiae. Bruising noted on abdomen. Psych: he is tearful today    Laboratory  Data:  Results for KORI CHANEY (MRN [id number]) as of 2/5/2018 14:18   2/5/2018 09:54   Albumin 3.3 (L)   Protein Total 7.2   Bilirubin Total 0.6   Alkaline Phosphatase 84   ALT 20   AST 18   Bilirubin Direct 0.2   WBC 1.9 (L)   Hemoglobin 11.4 (L)   Hematocrit 35.7 (L)   Platelet Count 124 (L)   RBC Count 3.79 (L)   MCV 94   MCH 30.1   MCHC 31.9   RDW 14.0   Diff Method Automated Method   % Neutrophils 44.6   % Lymphocytes 38.2   % Monocytes 15.2   % Eosinophils 0.5   % Basophils 1.0   % Immature Granulocytes 0.5   Nucleated RBCs 0   Absolute Neutrophil 0.9 (L)   Absolute Lymphocytes 0.7 (L)   Absolute Monocytes 0.3   Absolute Eosinophils 0.0   Absolute Basophils 0.0   Abs Immature Granulocytes 0.0   Absolute Nucleated RBC 0.0       Assessment and Plan:  1. Metastatic esophogeal cancer.  He had progression of disease by PET/CT in 12/2017.  He underwent biopsy for Foundation One testing which is currently pending. We should have results very soon. Plan was changed to Taxol/Cyramza and Cyramza was started on C1D15 recent PE.  He has tolerated Taxol fairly well and first dose of Cyramza given on 1/22/18. WBC 1.9 down from 2.6 last week.  ANC 0.9 down from 1.1, 2.1 and 2.1 during previous weeks. Discussed this with Dr. Dee patient feeling well, therefore will HOLD Taxol this week, however continue with Cyramza today. Cyramza given every 2 weeks and therefore if counts recover, only Taxol next week on 2/12/18 and then both Taxol/Cyramza on 2/19/18.   --2/6 Dr Lacy with Palliative Care  --2/12 C2D8 (Taxol only)  --2/19 Nicole Sutherland PA-C and C2D15  --3/6 CTcap, Dr Dee    2. Syncope: Occurred during blood draw a few weeks ago. Workup unremarkable and proceeded with chemotherapy.  No symptoms during blood draw 2 weeks ago or today and has been asymptomatic since this episode.  Vital signs stable today.  Will continue to monitor.     3. Diarrhea: Likely from Taxol with improvement since increasing Imodium at  home.  We will continue to monitor.    4. Atrial fibrillation: Evaluated by cardiology on 8/21/17.  Heart rate in clinic today 95 and asymptomatic.  Currently on Lovenox.  --Will refer to cardiology if becomes symptomatic     5. H/o PE: This was originally identified on staging in 07/2017.  He was on Xarelto.  At his restaging evaluation he had progressive symptoms with more clot burden and was started on Lovenox 1 mg/kg twice daily. Hep 10a was 1.02 on 12/26/17 and 1.28 on 124/18.  Lovenox was reduced to 80 mg twice a day and repeat heparin 10 A level 0.81 on 1/29/18.  We will continue this dosing.  Minimal bruising on lower abdomen and reports of bright red blood per rectum with occasional stools, however no active bleeding today.  Will closely monitor.    6. Coping: Reuben states that his coping has improved.  He has had a few more answers than her previous visit and he is much more calm today.  He is here with his sister today.  Has an appointment with palliative care tomorrow.  We did not discuss any pain today and the visit went very well. Please refrain from asking him about his pain (see interval history).     7. Peripheral neuropathy: Chronic since FOLFOX therapy and slightly increased neuropathy on the fingertips of all 5 fingers.  Grade 1 at this time. Continue to monitor.    8. Proteinuria: Urine with 30 mg/dL today and up from 2 weeks ago when it was 10 mg/dL.  We will continue to monitor, but no intervention at this time    9. Foundation One: Still in process, however discussed this would not change our treatment plan at this time.  Results should be back very soon.  Patient seemed more comfortable after our discussion today.      Chelo Rogers PA-C    I saw patient and performed the ROS and exam myself.  The assessment and plan were mutually discussed and this note was edited to reflect my findings.  Nicole Sutherland PA-C

## 2018-02-06 ENCOUNTER — OFFICE VISIT (OUTPATIENT)
Dept: ONCOLOGY | Facility: CLINIC | Age: 58
End: 2018-02-06
Attending: PSYCHOLOGIST
Payer: COMMERCIAL

## 2018-02-06 DIAGNOSIS — F43.21 ADJUSTMENT DISORDER WITH DEPRESSED MOOD: Primary | ICD-10-CM

## 2018-02-06 LAB — LAB SCANNED RESULT: NORMAL

## 2018-02-06 PROCEDURE — 90834 PSYTX W PT 45 MINUTES: CPT | Mod: ZP | Performed by: PSYCHOLOGIST

## 2018-02-06 NOTE — MR AVS SNAPSHOT
After Visit Summary   2/6/2018    Reuben Padilla    MRN: [id number]           Patient Information     Date Of Birth          1960        Visit Information        Provider Department      2/6/2018 8:00 AM Dao Lacy PsyD;  2 119 CONSULT [facility]        Today's Diagnoses     Adjustment disorder with depressed mood    -  1       Follow-ups after your visit        Your next 10 appointments already scheduled     Feb 19, 2018 11:15 AM CST   [facility] Draw with  [facility] LAB DRAW   [facility] Draw ([facility])    [address] [place] [address]  [address]  [facility] [address]   [phone]            Feb 19, 2018 11:50 AM CST   (Arrive by 11:35 AM)   Return Visit with Loraine Sutherland PA-C   [facility] ([facility])    [address] [place] [address]  [address]  [facility] [address]   [phone]            Feb 19, 2018 12:30 PM CST   Infusion 180 with  ONCOLOGY INFUSION, UC 30 ATC   [facility] ([facility])    [address] [place] [address]  [address]  [facility] [address]   [phone]            Mar 06, 2018  9:30 AM CST   [facility] Lab Draw with  [facility] LAB DRAW   [facility] Lab Draw ([facility])    [address] [place] [address]  [address]  [facility] [address]   [phone]            Mar 06, 2018 10:00 AM CST   (Arrive by 9:45 AM)   CT CHEST/ABDOMEN/PELVIS W CONTRAST with UCCT2   [facility] ([facility])    [address] [place] [address]  1st Floor  [facility] [address]   [phone]           Please bring any scans or X-rays taken at other hospitals, if similar tests were done. Also bring a list of your medicines, including vitamins, minerals and over-the-counter drugs. It is safest to leave personal items at home.  Be sure to tell your doctor:   If you have any allergies.   If  there s any chance you are pregnant.   If you are breastfeeding.  You may have contrast for this exam. To prepare:   Do not eat or drink for 2 hours before your exam. If you need to take medicine, you may take it with small sips of water. (We may ask you to take liquid medicine as well.)   The day before your exam, drink extra fluids at least six 8-ounce glasses (unless your doctor tells you to restrict your fluids).   You will be given instructions on how to drink the contrast.  Patients over 70 or patients with diabetes or kidney problems:   If you haven t had a blood test (creatinine test) within the last 30 days, the Cardiologist/Radiologist may require you to get this test prior to your exam.  If you have diabetes:   Continue to take your metformin medication on the day of your exam  Please wear loose clothing, such as a sweat suit or jogging clothes. Avoid snaps, zippers and other metal. We may ask you to undress and put on a hospital gown.  If you have any questions, please call the Imaging Department where you will have your exam.            Mar 06, 2018 12:30 PM CST   (Arrive by 12:15 PM)   Return Visit with Kadi Dee MD   [facility] ([facility])    [address]  [address]  [facility] [address] [phone]              Who to contact     If you have questions or need follow up information about today's clinic visit or your schedule please contact [facility] directly at [phone].  Normal or non-critical lab and imaging results will be communicated to you by MyChart, letter or phone within 4 business days after the clinic has received the results. If you do not hear from us within 7 days, please contact the clinic through MyChart or phone. If you have a critical or abnormal lab result, we will notify you by phone as soon as possible.  Submit refill requests through "[employer]" or call your pharmacy and they will forward  the refill request to us. Please allow 3 business days for your refill to be completed.          Additional Information About Your Visit        [employer] Information     [employer] gives you secure access to your electronic health record. If you see a primary care provider, you can also send messages to your care team and make appointments. If you have questions, please call your primary care clinic.  If you do not have a primary care provider, please call [phone] and they will assist you.        Care EveryWhere ID     This is your Care EveryWhere ID. This could be used by other organizations to access your [facility] medical records  [id number]         Blood Pressure from Last 3 Encounters:   02/12/18 122/86   02/09/18 125/87   02/05/18 126/72    Weight from Last 3 Encounters:   02/12/18 (!) 162.8 kg (358 lb 14.4 oz)   02/09/18 (!) 164.6 kg (362 lb 12.8 oz)   02/05/18 (!) 164.8 kg (363 lb 4.8 oz)              Today, you had the following     No orders found for display       Primary Care Provider Fax #    Physician No Ref-Primary [phone]       No address on file        Equal Access to Services     [facility]: Hadii antonio navarroo Germán, waaxda lumegganadaha, qaybta kaalmada adelaurynyada, geraldo washington . So [facility] [phone].    ATENCIÓN: Si habla español, tiene a alamo disposición servicios gratuitos de asistencia lingüística. Llame al [phone].    We comply with applicable federal civil rights laws and [place] laws. We do not discriminate on the basis of race, color, national origin, age, disability, sex, sexual orientation, or gender identity.            Thank you!     Thank you for choosing [facility]  for your care. Our goal is always to provide you with excellent care. Hearing back from our patients is one way we can continue to improve our services. Please take a few minutes to complete the written survey that you may receive in the mail after your visit  with us. Thank you!             Your Updated Medication List - Protect others around you: Learn how to safely use, store and throw away your medicines at www.disposemymeds.org.          This list is accurate as of 2/6/18 11:59 PM.  Always use your most recent med list.                   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)       LORazepam 0.5 MG tablet    ATIVAN    30 tablet    Take 1 tablet (0.5 mg) by mouth every 4 hours as needed (Anxiety, Nausea/Vomiting or Sleep)    Cancer of distal third of esophagus (H)       Multi-vitamin Tabs tablet      Take 1 tablet by mouth daily        ondansetron 8 MG tablet    ZOFRAN    30 tablet    Take 1 tablet (8 mg) by mouth every 8 hours as needed (Nausea/Vomiting)    Cancer of distal third of esophagus (H)       prochlorperazine 10 MG tablet    COMPAZINE    30 tablet    Take 1 tablet (10 mg) by mouth every 6 hours as needed (Nausea/Vomiting)    Cancer of distal third of esophagus (H)       timolol 0.5 % ophthalmic solution    TIMOPTIC     Place into both eyes daily    Cancer of distal third of esophagus (H)       zolpidem 10 MG tablet    AMBIEN    60 tablet    Take 1 tablet (10 mg) by mouth nightly as needed    Metastasis from gastric cancer (H)

## 2018-02-06 NOTE — LETTER
"2/6/2018       RE: Reuben Padilla  [address] [facility] [address]     Dear Colleague,    Thank you for referring your patient, Reuben Padilla, to the [facility]. Please see a copy of my visit note below.    Confidential Summary of Oncology Psychology Followup Visit    Reuben Padilla is a 57 year old male who presents for depressed mood secondary to cancer prognosis.   The patient was seen for a 42 minute appointment on 2/6/2018.    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     Objective: Nov-verbal communication matched his verbal reports. Good energy in his eyes and voice.     Diagnosis:   Encounter Diagnosis   Name Primary?     Adjustment disorder with depressed mood Yes     Recommendations/Plan:  1. Continue to meet with his family and friends  2. Return for follow-up as needed.     Thank you for this opportunity to participate in your care of this patient.    Dao Lacy Psy.D., L.P.  Director, Oncology Supportive Care   "

## 2018-02-08 ENCOUNTER — TELEPHONE (OUTPATIENT)
Dept: ONCOLOGY | Facility: CLINIC | Age: 58
End: 2018-02-08

## 2018-02-08 ENCOUNTER — DOCUMENTATION ONLY (OUTPATIENT)
Dept: ONCOLOGY | Facility: CLINIC | Age: 58
End: 2018-02-08

## 2018-02-08 DIAGNOSIS — C15.5 CANCER OF DISTAL THIRD OF ESOPHAGUS (H): Primary | ICD-10-CM

## 2018-02-08 ASSESSMENT — ENCOUNTER SYMPTOMS
BLOATING: 0
NAUSEA: 0
BACK PAIN: 1
POLYDIPSIA: 0
VOMITING: 0
FATIGUE: 1
TREMORS: 0
NAIL CHANGES: 0
JOINT SWELLING: 0
TINGLING: 1
POLYDIPSIA: 0
SLEEP DISTURBANCES DUE TO BREATHING: 0
MYALGIAS: 1
FATIGUE: 1
NECK PAIN: 0
HEARTBURN: 0
JOINT SWELLING: 0
ORTHOPNEA: 0
HALLUCINATIONS: 0
DIARRHEA: 1
ALTERED TEMPERATURE REGULATION: 1
ARTHRALGIAS: 0
HEADACHES: 0
POOR WOUND HEALING: 0
CONSTIPATION: 1
JAUNDICE: 0
NECK PAIN: 0
LIGHT-HEADEDNESS: 1
RECTAL PAIN: 1
DIZZINESS: 1
BLOOD IN STOOL: 1
NIGHT SWEATS: 0
MUSCLE CRAMPS: 0
DECREASED APPETITE: 0
STIFFNESS: 0
ARTHRALGIAS: 0
BRUISES/BLEEDS EASILY: 0
SKIN CHANGES: 0
JAUNDICE: 0
SEIZURES: 0
DISTURBANCES IN COORDINATION: 1
HYPERTENSION: 0
DISTURBANCES IN COORDINATION: 1
ABDOMINAL PAIN: 0
NUMBNESS: 1
MUSCLE CRAMPS: 0
CHILLS: 1
DIZZINESS: 1
SPEECH CHANGE: 0
HALLUCINATIONS: 0
PALPITATIONS: 0
LEG PAIN: 0
LIGHT-HEADEDNESS: 1
RECTAL PAIN: 1
INCREASED ENERGY: 1
HYPOTENSION: 0
ALTERED TEMPERATURE REGULATION: 1
BLOOD IN STOOL: 1
VOMITING: 0
ABDOMINAL PAIN: 0
DIARRHEA: 1
WEIGHT GAIN: 0
BLOATING: 0
NIGHT SWEATS: 0
MYALGIAS: 1
PARALYSIS: 0
WEIGHT GAIN: 0
POOR WOUND HEALING: 0
PALPITATIONS: 0
FEVER: 0
DECREASED APPETITE: 0
WEIGHT LOSS: 0
WEIGHT LOSS: 0
LOSS OF CONSCIOUSNESS: 0
HYPERTENSION: 0
SEIZURES: 0
TREMORS: 0
SLEEP DISTURBANCES DUE TO BREATHING: 0
LOSS OF CONSCIOUSNESS: 0
HEARTBURN: 0
SPEECH CHANGE: 0
NUMBNESS: 1
LEG PAIN: 0
MUSCLE WEAKNESS: 1
BRUISES/BLEEDS EASILY: 0
FEVER: 0
HYPOTENSION: 0
SYNCOPE: 0
SWOLLEN GLANDS: 0
CONSTIPATION: 1
POLYPHAGIA: 0
NAUSEA: 0
MEMORY LOSS: 1
MEMORY LOSS: 1
WEAKNESS: 1
PARALYSIS: 0
WEAKNESS: 1
TINGLING: 1
STIFFNESS: 0
NAIL CHANGES: 0
MUSCLE WEAKNESS: 1
POLYPHAGIA: 0
SYNCOPE: 0
ORTHOPNEA: 0
INCREASED ENERGY: 1
HEADACHES: 0
CHILLS: 1
SWOLLEN GLANDS: 0
BACK PAIN: 1
SKIN CHANGES: 0

## 2018-02-08 NOTE — TELEPHONE ENCOUNTER
"[facility] Telephone Triage Note    Assessment: Patient called in to triage reporting the following symptoms: soreness at umbilicus starting last night.  This morning, he noticed a dark colored, foul smelling discharge from the umbilicus.  No fevers, cough, redness.  He gives his lovenox injections \"at least three to four inches away\" from the umbilicus. .    Recommendations: Discussed with SERGIO Bonilla.  Clinic visit  tomorrow with the following procedures/labs:  no procedures,  CBC and BMP,  no infusion.    Follow-Up: Order for above labs/procedures/infusion placed, Message sent to schedulers to add pt on to schedule, Informed patient of appointment times, Instructed patient to seek care immediately for worsening symptoms, including: fever, chest pain, shortness of breath, dizziness. Patient voiced understanding of advice and/or instructions given.     "

## 2018-02-08 NOTE — PROGRESS NOTES
Per request,  completed and faxed [facility] referral for lodging for clinic dates of Feb 19 and March 6.  Caregiver exception letter signed by PA and faxed as well. [facility] will contact Patient directly for confirmation of reservation.  will continue to provide support as needed.      GASPER Tillman, MSW   - [facility]  Phone: [phone]  Pager: [phone]  2/8/2018

## 2018-02-09 ENCOUNTER — ONCOLOGY VISIT (OUTPATIENT)
Dept: ONCOLOGY | Facility: CLINIC | Age: 58
End: 2018-02-09
Attending: PHYSICIAN ASSISTANT
Payer: COMMERCIAL

## 2018-02-09 ENCOUNTER — RADIANT APPOINTMENT (OUTPATIENT)
Dept: CT IMAGING | Facility: CLINIC | Age: 58
End: 2018-02-09
Attending: PHYSICIAN ASSISTANT
Payer: COMMERCIAL

## 2018-02-09 ENCOUNTER — APPOINTMENT (OUTPATIENT)
Dept: LAB | Facility: CLINIC | Age: 58
End: 2018-02-09
Attending: INTERNAL MEDICINE
Payer: COMMERCIAL

## 2018-02-09 VITALS
OXYGEN SATURATION: 96 % | SYSTOLIC BLOOD PRESSURE: 125 MMHG | TEMPERATURE: 98 F | BODY MASS INDEX: 41.93 KG/M2 | DIASTOLIC BLOOD PRESSURE: 87 MMHG | HEART RATE: 98 BPM | WEIGHT: 315 LBS

## 2018-02-09 DIAGNOSIS — L03.319 CELLULITIS AND ABSCESS OF TRUNK: ICD-10-CM

## 2018-02-09 DIAGNOSIS — C15.5 CANCER OF DISTAL THIRD OF ESOPHAGUS (H): ICD-10-CM

## 2018-02-09 DIAGNOSIS — L02.219 CELLULITIS AND ABSCESS OF TRUNK: Primary | ICD-10-CM

## 2018-02-09 DIAGNOSIS — L03.319 CELLULITIS AND ABSCESS OF TRUNK: Primary | ICD-10-CM

## 2018-02-09 DIAGNOSIS — L02.219 CELLULITIS AND ABSCESS OF TRUNK: ICD-10-CM

## 2018-02-09 LAB
ANION GAP SERPL CALCULATED.3IONS-SCNC: 8 MMOL/L (ref 3–14)
BASOPHILS # BLD AUTO: 0 10E9/L (ref 0–0.2)
BASOPHILS NFR BLD AUTO: 0.7 %
BUN SERPL-MCNC: 9 MG/DL (ref 7–30)
CALCIUM SERPL-MCNC: 8.6 MG/DL (ref 8.5–10.1)
CHLORIDE SERPL-SCNC: 111 MMOL/L (ref 94–109)
CO2 SERPL-SCNC: 22 MMOL/L (ref 20–32)
CREAT SERPL-MCNC: 0.83 MG/DL (ref 0.66–1.25)
DIFFERENTIAL METHOD BLD: ABNORMAL
EOSINOPHIL # BLD AUTO: 0 10E9/L (ref 0–0.7)
EOSINOPHIL NFR BLD AUTO: 1 %
ERYTHROCYTE [DISTWIDTH] IN BLOOD BY AUTOMATED COUNT: 13.8 % (ref 10–15)
GFR SERPL CREATININE-BSD FRML MDRD: >90 ML/MIN/1.7M2
GLUCOSE SERPL-MCNC: 95 MG/DL (ref 70–99)
HCT VFR BLD AUTO: 38 % (ref 40–53)
HGB BLD-MCNC: 12.7 G/DL (ref 13.3–17.7)
IMM GRANULOCYTES # BLD: 0 10E9/L (ref 0–0.4)
IMM GRANULOCYTES NFR BLD: 0.3 %
LYMPHOCYTES # BLD AUTO: 0.9 10E9/L (ref 0.8–5.3)
LYMPHOCYTES NFR BLD AUTO: 29.6 %
MCH RBC QN AUTO: 30.8 PG (ref 26.5–33)
MCHC RBC AUTO-ENTMCNC: 33.4 G/DL (ref 31.5–36.5)
MCV RBC AUTO: 92 FL (ref 78–100)
MONOCYTES # BLD AUTO: 0.3 10E9/L (ref 0–1.3)
MONOCYTES NFR BLD AUTO: 10.1 %
NEUTROPHILS # BLD AUTO: 1.7 10E9/L (ref 1.6–8.3)
NEUTROPHILS NFR BLD AUTO: 58.3 %
NRBC # BLD AUTO: 0 10*3/UL
NRBC BLD AUTO-RTO: 0 /100
PLATELET # BLD AUTO: 147 10E9/L (ref 150–450)
POTASSIUM SERPL-SCNC: 3.8 MMOL/L (ref 3.4–5.3)
RBC # BLD AUTO: 4.13 10E12/L (ref 4.4–5.9)
SODIUM SERPL-SCNC: 141 MMOL/L (ref 133–144)
WBC # BLD AUTO: 2.9 10E9/L (ref 4–11)

## 2018-02-09 PROCEDURE — 36591 DRAW BLOOD OFF VENOUS DEVICE: CPT

## 2018-02-09 PROCEDURE — 99214 OFFICE O/P EST MOD 30 MIN: CPT | Mod: ZP | Performed by: PHYSICIAN ASSISTANT

## 2018-02-09 PROCEDURE — 80048 BASIC METABOLIC PNL TOTAL CA: CPT | Performed by: PHYSICIAN ASSISTANT

## 2018-02-09 PROCEDURE — G0463 HOSPITAL OUTPT CLINIC VISIT: HCPCS | Mod: ZF

## 2018-02-09 PROCEDURE — 25000128 H RX IP 250 OP 636: Mod: ZF | Performed by: PHYSICIAN ASSISTANT

## 2018-02-09 PROCEDURE — 85025 COMPLETE CBC W/AUTO DIFF WBC: CPT | Performed by: PHYSICIAN ASSISTANT

## 2018-02-09 RX ORDER — SULFAMETHOXAZOLE/TRIMETHOPRIM 800-160 MG
1 TABLET ORAL 2 TIMES DAILY
Qty: 14 TABLET | Refills: 0 | Status: SHIPPED | OUTPATIENT
Start: 2018-02-09 | End: 2018-02-16

## 2018-02-09 RX ORDER — IOPAMIDOL 755 MG/ML
135 INJECTION, SOLUTION INTRAVASCULAR ONCE
Status: COMPLETED | OUTPATIENT
Start: 2018-02-09 | End: 2018-02-09

## 2018-02-09 RX ORDER — HEPARIN SODIUM (PORCINE) LOCK FLUSH IV SOLN 100 UNIT/ML 100 UNIT/ML
5 SOLUTION INTRAVENOUS EVERY 8 HOURS
Status: DISCONTINUED | OUTPATIENT
Start: 2018-02-09 | End: 2018-02-09 | Stop reason: HOSPADM

## 2018-02-09 RX ADMIN — SODIUM CHLORIDE, PRESERVATIVE FREE 5 ML: 5 INJECTION INTRAVENOUS at 10:24

## 2018-02-09 RX ADMIN — IOPAMIDOL 135 ML: 755 INJECTION, SOLUTION INTRAVASCULAR at 12:01

## 2018-02-09 NOTE — NURSING NOTE
Chief Complaint   Patient presents with     Port Draw     accessed with power needle, heparin locked, vitalsl checked     Port left accessed JIC, please de access prior to leaving clinic

## 2018-02-09 NOTE — NURSING NOTE
"Oncology Rooming Note    February 9, 2018 10:51 AM   Reuben Padilla is a 57 year old male who presents for:    Chief Complaint   Patient presents with     Port Draw     accessed with power needle, heparin locked, vitalsl checked     Oncology Clinic Visit     return patient visit for follow up related to esophageal cancer      Initial Vitals: /87 (BP Location: Right arm, Patient Position: Right side, Cuff Size: Adult Large)  Pulse 98  Temp 98  F (36.7  C) (Oral)  Wt (!) 164.6 kg (362 lb 12.8 oz)  SpO2 96%  BMI 41.93 kg/m2 Estimated body mass index is 41.93 kg/(m^2) as calculated from the following:    Height as of 1/8/18: 1.981 m (6' 5.99\").    Weight as of this encounter: 164.6 kg (362 lb 12.8 oz). Body surface area is 3.01 meters squared.  Data Unavailable Comment: Data Unavailable   No LMP for male patient.  Allergies reviewed: No  Medications reviewed: No    Medications: Medication refills not needed today.  Pharmacy name entered into [employer]    [id number] - [place] - [address] [place] [address] AT INTERSECTION [address] [place] [address] - [address] [place] [address] [phone]  [id number] - [place], [place] - [address] [place] [address]    Clinical concerns: patient declined allergy and medication reconciliation. The patient was rude to me and ignored questions I asked so I stopped the rooming process. I did speak with the provider and let her know. tatsumi was notified.    3 minutes for nursing intake (face to face time)     Damián Osullivan CMA              "

## 2018-02-09 NOTE — PROGRESS NOTES
Oncology/Hematology Visit Note  Feb 9, 2018    Reason for Visit: Follow up metastatic esophageal cancer. 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 His tumor is positive for cytokeratin 20, negative for P63 and CK7, and HER2 is negative. He started on FOLFOX (5FU/oxaliplatin) on 5/15/2017. He had a delay in treatment from 9/5-10/2/17 due to work related injury. He had an excellent response by imaging throughout the summer 2017. He had progression of disease by PET/CT in 12/2017.  He underwent biopsy for Foundation One testing which is currently pending.  Plan was changed to Taxol and Cymramza, although Cyramza on hold for 2 weeks due to recent PE. Please see previous notes for further details on the patient's history. He was in lab today prior to infusion appointment when he had syncopal event.    Reuben was seen on 1/8/18 as an add-on due to syncopal event.  Most likely vasovagal reaction since he had had previous syncopal episodes with blood draws. Please see note for full details.  His chemotherapy was continued. He is s/p cycle 2, day 1 of Taxol/Cyramza.     Interval History: Reuben presents by himself today. He states that earlier this week he started having redness of skin and pain surrounding his umbilicus. Yesterday morning he noticed dark colored, foul smelling discharge from the umbilicus. His impression was that it was mixed pus and blood. He wiped it away and has not noticed any further drainage although when he awoke this morning there was a small amount of brown crust in his umbilicus. Since yesterday he is having less pain and redness in the area. He has some mild residual tenderness. He denies this every happening to him in the past. He denies any fever, chills, nausea. He injects lovenox more laterally  in his abdomen, so not near his injection sites. Appetite is good and he feels fine otherwise. He is only having 1-2 loose bowel movements daily and is no longer taking imodium.     Current Outpatient Prescriptions   Medication Sig Dispense Refill     zolpidem (AMBIEN) 10 MG tablet Take 1 tablet (10 mg) by mouth nightly as needed 60 tablet 1     LORazepam (ATIVAN) 0.5 MG tablet Take 1 tablet (0.5 mg) by mouth every 4 hours as needed (Anxiety, Nausea/Vomiting or Sleep) (Patient not taking: Reported on 1/15/2018) 30 tablet 2     prochlorperazine (COMPAZINE) 10 MG tablet Take 1 tablet (10 mg) by mouth every 6 hours as needed (Nausea/Vomiting) (Patient not taking: Reported on 1/22/2018) 30 tablet 2     enoxaparin (LOVENOX) 80 MG/0.8ML injection Inject 1.3 mLs (130 mg) Subcutaneous 2 times daily 60 Syringe 3     IBUPROFEN PO Take 600 mg by mouth every 4 hours as needed for moderate pain       [DISCONTINUED] dexamethasone (DECADRON) 4 MG tablet Take two tablets daily on days 2,3 and then 1 tablet daily days 4,5 6 tablet 1     ondansetron (ZOFRAN) 8 MG tablet Take 1 tablet (8 mg) by mouth every 8 hours as needed (Nausea/Vomiting) (Patient not taking: Reported on 2/5/2018) 30 tablet 2     lidocaine-prilocaine (EMLA) cream Apply topically as needed for moderate pain 30 g 3     multivitamin, therapeutic with minerals (MULTI-VITAMIN) TABS tablet Take 1 tablet by mouth daily       fish oil-omega-3 fatty acids 1000 MG capsule Take 2 g by mouth daily       timolol (TIMOPTIC) 0.5 % ophthalmic solution Place into both eyes daily       Physical Examination:  General: The patient is a pleasant male, in no acute distress.   /87 (BP Location: Right arm, Patient Position: Right side, Cuff Size: Adult Large)  Pulse 98  Temp 98  F (36.7  C) (Oral)  Wt (!) 164.6 kg (362 lb 12.8 oz)  SpO2 96%  BMI 41.93 kg/m2    Wt Readings from Last 10 Encounters:   02/09/18 (!) 164.6 kg (362 lb 12.8 oz)   02/05/18 (!) 164.8 kg (363 lb 4.8 oz)    01/22/18 (!) 161.3 kg (355 lb 9.6 oz)   01/08/18 (!) 163.3 kg (360 lb)   12/27/17 (!) 158.8 kg (350 lb)   12/12/17 (!) 159.2 kg (351 lb)   12/11/17 (!) 159.9 kg (352 lb 9.6 oz)   11/28/17 (!) 163.1 kg (359 lb 9.6 oz)   11/13/17 (!) 163.8 kg (361 lb 3.2 oz)   10/30/17 (!) 159.3 kg (351 lb 4.8 oz)     HEENT: EOMI, PERRL. Sclerae are anicteric.   Heart: Irregular rate and rhythm.   Lungs: Clear to auscultation bilaterally.   Abdomen: Obese, soft. Mixture of older and newer ecchymotic areas on right and left side of lower abdomen. Area surrounding umbilicus with faint erythema measuring about 10cm in diameter. Mild TTP in 4 oclock position around umbilicus. Umbilicus explored with cotton tip applicator. Some dried brown/red material in umbilicus, but no fluid. No tract or fistula. No palpable fluctuance or induration.  Extremities: Hyperpigmentation of right leg. Right leg a little more swollen than left but patient states this is chronic and unchanged.  Neuro: grossly non-focal  Skin: No other rashes, ecchymosis or petechiae except what is noted above.    Laboratory Data:  Results for KORI CHANEY (MRN [id number]) as of 2/9/2018 15:52   2/9/2018 10:25   Sodium 141   Potassium 3.8   Chloride 111 (H)   Carbon Dioxide 22   Urea Nitrogen 9   Creatinine 0.83   GFR Estimate >90   GFR Estimate If Black >90   Calcium 8.6   Anion Gap 8   Glucose 95   WBC 2.9 (L)   Hemoglobin 12.7 (L)   Hematocrit 38.0 (L)   Platelet Count 147 (L)   RBC Count 4.13 (L)   MCV 92   MCH 30.8   MCHC 33.4   RDW 13.8   Diff Method Automated Method   % Neutrophils 58.3   % Lymphocytes 29.6   % Monocytes 10.1   % Eosinophils 1.0   % Basophils 0.7   % Immature Granulocytes 0.3   Nucleated RBCs 0   Absolute Neutrophil 1.7   Absolute Lymphocytes 0.9   Absolute Monocytes 0.3   Absolute Eosinophils 0.0   Absolute Basophils 0.0   Abs Immature Granulocytes 0.0   Absolute Nucleated RBC 0.0     Imaging:  CT A/P w/contrast: Preliminary report with no  abscess or fistula. Some inflammation of skin/sot tissue.    Assessment and Plan:    Cellulitis, periumbilical: Afebrile. Leukopenia but ANC has recovered to 1.7. Possible he had an abscess that spontaneously opened and drained given history of pain and redness a few days ago. He had relief of pain after drainage from umbilicus was noted. He still has some faint erythema surrounding umbilicus and mild TTP as noted above. Will give Bactrim DS 1 tab BID x 7 days to cover MRSA. Patient is staying at [facility] and will be back on Monday. He will call if there is any increased redness, new drainage, bleeding, increased abdominal pain, fever, chills or any other concerning symptoms. Will have RN check area on Monday prior to Taxol and page me if any concerns.    Metastatic esophogeal cancer.  He had progression of disease by PET/CT in 12/2017.  He underwent biopsy for Foundation One testing which is currently pending. We should have results very soon. Plan was changed to Taxol/Cyramza and Cyramza was started on C1D15 recent PE.  He has tolerated Taxol fairly well and first dose of Cyramza given on 1/22/18. WBC 1.9 down from 2.6 last week.  ANC 0.9 down from 1.1, 2.1 and 2.1 during previous weeks. Discussed this with Dr. Dee patient feeling well, therefore will HOLD Taxol this week, however continue with Cyramza today. Cyramza given every 2 weeks and therefore if counts recover, only Taxol next week on 2/12/18 and then both Taxol/Cyramza on 2/19/18.   --2/12 C2D8 (Taxol only)  --2/19 Nicole Sutherland PA-C and C2D15  --3/6 CTcap, Dr Dee    Syncope: Occurred during blood draw a few weeks ago. Workup unremarkable and proceeded with chemotherapy.  No symptoms during blood draw 2 weeks ago or today and has been asymptomatic since this episode.  Vital signs stable today.  Will continue to monitor.     Diarrhea: Likely from Taxol. Improved. On imodium prn.    Atrial fibrillation: Evaluated by cardiology on 8/21/17.  Heart  rate in clinic today 95 and asymptomatic.  Currently on Lovenox.  --Will refer to cardiology if becomes symptomatic     H/o PE: This was originally identified on staging in 07/2017.  He was on Xarelto.  At his restaging evaluation he had progressive symptoms with more clot burden and was started on Lovenox 1 mg/kg twice daily. Hep 10a was 1.02 on 12/26/17 and 1.28 on 124/18.  Lovenox was reduced to 80 mg twice a day and repeat heparin 10 A level 0.81 on 1/29/18.  We will continue this dosing.  Minimal bruising on lower abdomen.  Will closely monitor.    Peripheral neuropathy: Chronic since FOLFOX therapy and slightly increased neuropathy on the fingertips of all 5 fingers.  Grade 1 at this time. Continue to monitor.    Proteinuria: Urine with 30 mg/dL on 2/5 up from 10 mg/dL.  We will continue to monitor, but no intervention at this time    Foundation One: Still in process.    Amparo Sow PA-C  [facility]  [address] [place], [place] [address] [phone]

## 2018-02-09 NOTE — MR AVS SNAPSHOT
After Visit Summary   2/9/2018    Reuben Padilla    MRN: [id number]           Patient Information     Date Of Birth          1960        Visit Information        Provider Department      2/9/2018 10:40 AM Amparo Sow PA [facility]        Today's Diagnoses     Cellulitis and abscess of trunk    -  1    Cancer of distal third of esophagus (H)           Follow-ups after your visit        Your next 10 appointments already scheduled     Feb 12, 2018  8:00 AM CST   [facility] Lab Draw with UC MASONIC LAB DRAW   [facility] ([facility])    [address] [place] [address]  [address]  [facility] [address]   [phone]            Feb 12, 2018  8:30 AM CST   Infusion 120 with UC ONCOLOGY INFUSION, UC 19 ATC   [facility] ([facility])    [address] [place] [address]  [address]  [facility] [address]   [phone]            Feb 19, 2018 11:15 AM CST   [facility] Lab Draw with UC MASON[facility] LAB DRAW   [facility] ([facility])    [address] [place] [address]  [address]  [facility] [address]   [phone]            Feb 19, 2018 11:50 AM CST   (Arrive by 11:35 AM)   Return Visit with Loraine Sutherland PA-C   [facility] ([facility])    [address] [place] [address]  [address]  [facility] [address]   [phone]            Feb 19, 2018 12:30 PM CST   Infusion 180 with UC ONCOLOGY INFUSION, UC 30 ATC   [facility] ([facility])    [address] [place] [address]  [address]  [facility] [address]   [phone]            Mar 06, 2018  9:30 AM CST   [facility] Lab Draw with UC MASONIC LAB DRAW   [facility] Lab Draw ([facility])    [address] [place] [address]  [address]  [facility] [address]   [phone]            Mar 06, 2018 10:00 AM CST   (Arrive by 9:45 AM)   CT  CHEST/ABDOMEN/PELVIS W CONTRAST with UCCT2   [facility] ([facility])    [address]  1[address]  [facility] [address] [phone]           Please bring any scans or X-rays taken at other hospitals, if similar tests were done. Also bring a list of your medicines, including vitamins, minerals and over-the-counter drugs. It is safest to leave personal items at home.  Be sure to tell your doctor:   If you have any allergies.   If there s any chance you are pregnant.   If you are breastfeeding.  You may have contrast for this exam. To prepare:   Do not eat or drink for 2 hours before your exam. If you need to take medicine, you may take it with small sips of water. (We may ask you to take liquid medicine as well.)   The day before your exam, drink extra fluids at least six 8-ounce glasses (unless your doctor tells you to restrict your fluids).   You will be given instructions on how to drink the contrast.  Patients over 70 or patients with diabetes or kidney problems:   If you haven t had a blood test (creatinine test) within the last 30 days, the Cardiologist/Radiologist may require you to get this test prior to your exam.  If you have diabetes:   Continue to take your metformin medication on the day of your exam  Please wear loose clothing, such as a sweat suit or jogging clothes. Avoid snaps, zippers and other metal. We may ask you to undress and put on a hospital gown.  If you have any questions, please call the Imaging Department where you will have your exam.            Mar 06, 2018 12:30 PM CST   (Arrive by 12:15 PM)   Return Visit with Kadi Dee MD   [facility] ([facility])    [address]  [address]  [facility] [address] [phone]              Who to contact     If you have questions or need follow up information about today's clinic visit or your schedule please contact [facility] directly at [phone].  Normal or non-critical lab and imaging results will be communicated to you by OneIDhart, letter or phone within 4 business days after the clinic has received the results. If you do not hear from us within 7 days, please contact the clinic through Escapiot or phone. If you have a critical or abnormal lab result, we will notify you by phone as soon as possible.  Submit refill requests through [employer] Path or call your pharmacy and they will forward the refill request to us. Please allow 3 business days for your refill to be completed.          Additional Information About Your Visit        Coherent Path Information     [employer] gives you secure access to your electronic health record. If you see a primary care provider, you can also send messages to your care team and make appointments. If you have questions, please call your primary care clinic.  If you do not have a primary care provider, please call [phone] and they will assist you.        Care EveryWhere ID     This is your Care EveryWhere ID. This could be used by other organizations to access your [facility] medical records  [id number]        Your Vitals Were     Pulse Temperature Pulse Oximetry BMI (Body Mass Index)          98 98  F (36.7  C) (Oral) 96% 41.93 kg/m2         Blood Pressure from Last 3 Encounters:   02/09/18 125/87   02/05/18 126/72   01/22/18 111/75    Weight from Last 3 Encounters:   02/09/18 (!) 164.6 kg (362 lb 12.8 oz)   02/05/18 (!) 164.8 kg (363 lb 4.8 oz)   01/22/18 (!) 161.3 kg (355 lb 9.6 oz)              We Performed the Following     Basic metabolic panel     CBC with platelets differential          Today's Medication Changes          These changes are accurate as of 2/9/18  5:16 PM.  If you have any questions, ask your nurse or doctor.               Start taking these medicines.        Dose/Directions    sulfamethoxazole-trimethoprim 800-160 MG per tablet   Commonly known as:  BACTRIM DS/SEPTRA  DS   Used for:  Cellulitis and abscess of trunk   Started by:  Amparo Sow PA        Dose:  1 tablet   Take 1 tablet by mouth 2 times daily for 7 days   Quantity:  14 tablet   Refills:  0            Where to get your medicines      These medications were sent to [facility] [place], [place] - [address]  [address], [facility] [address]    Hours:  TRANSPLANT PHONE NUMBER [phone] Phone:  [phone]     sulfamethoxazole-trimethoprim 800-160 MG per tablet                Primary Care Provider Fax #    Physician No Ref-Primary [phone]       No address on file        Equal Access to Services     [facility] : Hadshahana Dunlap, waomer davis, qaybjose luis kaalmada ashleigh, geraldo washington . So [facility] [phone].    ATENCIÓN: Si habla español, tiene a alamo disposición servicios gratuitos de asistencia lingüística. Ll[facility] [phone].    We comply with applicable federal civil rights laws and [place] laws. We do not discriminate on the basis of race, color, national origin, age, disability, sex, sexual orientation, or gender identity.            Thank you!     Thank you for choosing [facility]  for your care. Our goal is always to provide you with excellent care. Hearing back from our patients is one way we can continue to improve our services. Please take a few minutes to complete the written survey that you may receive in the mail after your visit with us. Thank you!             Your Updated Medication List - Protect others around you: Learn how to safely use, store and throw away your medicines at www.disposemymeds.org.          This list is accurate as of 2/9/18  5:16 PM.  Always use your most recent med list.                   Brand Name Dispense Instructions for use Diagnosis    enoxaparin 80 MG/0.8ML injection    LOVENOX    60 Syringe    Inject 1.3 mLs (130 mg) Subcutaneous 2 times  daily    Cancer of distal third of esophagus (H)       fish oil-omega-3 fatty acids 1000 MG capsule      Take 2 g by mouth daily        IBUPROFEN PO      Take 600 mg by mouth every 4 hours as needed for moderate pain        lidocaine-prilocaine cream    EMLA    30 g    Apply topically as needed for moderate pain    Cancer of distal third of esophagus (H)       LORazepam 0.5 MG tablet    ATIVAN    30 tablet    Take 1 tablet (0.5 mg) by mouth every 4 hours as needed (Anxiety, Nausea/Vomiting or Sleep)    Cancer of distal third of esophagus (H)       Multi-vitamin Tabs tablet      Take 1 tablet by mouth daily        ondansetron 8 MG tablet    ZOFRAN    30 tablet    Take 1 tablet (8 mg) by mouth every 8 hours as needed (Nausea/Vomiting)    Cancer of distal third of esophagus (H)       prochlorperazine 10 MG tablet    COMPAZINE    30 tablet    Take 1 tablet (10 mg) by mouth every 6 hours as needed (Nausea/Vomiting)    Cancer of distal third of esophagus (H)       sulfamethoxazole-trimethoprim 800-160 MG per tablet    BACTRIM DS/SEPTRA DS    14 tablet    Take 1 tablet by mouth 2 times daily for 7 days    Cellulitis and abscess of trunk       timolol 0.5 % ophthalmic solution    TIMOPTIC     Place into both eyes daily    Cancer of distal third of esophagus (H)       zolpidem 10 MG tablet    AMBIEN    60 tablet    Take 1 tablet (10 mg) by mouth nightly as needed    Metastasis from gastric cancer (H)

## 2018-02-09 NOTE — DISCHARGE INSTRUCTIONS

## 2018-02-09 NOTE — LETTER
2/9/2018      RE: Reuben Padilla  [address] [facility] [place] [address]       Oncology/Hematology Visit Note  Feb 9, 2018    Reason for Visit: Follow up metastatic esophageal cancer. 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 His tumor is positive for cytokeratin 20, negative for P63 and CK7, and HER2 is negative. He started on FOLFOX (5FU/oxaliplatin) on 5/15/2017. He had a delay in treatment from 9/5-10/2/17 due to work related injury. He had an excellent response by imaging throughout the summer 2017. He had progression of disease by PET/CT in 12/2017.  He underwent biopsy for Foundation One testing which is currently pending.  Plan was changed to Taxol and Cymramza, although Cyramza on hold for 2 weeks due to recent PE. Please see previous notes for further details on the patient's history. He was in lab today prior to infusion appointment when he had syncopal event.    Reuben was seen on 1/8/18 as an add-on due to syncopal event.  Most likely vasovagal reaction since he had had previous syncopal episodes with blood draws. Please see note for full details.  His chemotherapy was continued. He is s/p cycle 2, day 1 of Taxol/Cyramza.     Interval History: Reuben presents by himself today. He states that earlier this week he started having redness of skin and pain surrounding his umbilicus. Yesterday morning he noticed dark colored, foul smelling discharge from the umbilicus. His impression was that it was mixed pus and blood. He wiped it away and has not noticed any further drainage although when he awoke this morning there was a small amount of brown crust in his umbilicus. Since yesterday he is having less pain and redness in the area. He has some mild residual tenderness. He denies this every happening to him in  the past. He denies any fever, chills, nausea. He injects lovenox more laterally in his abdomen, so not near his injection sites. Appetite is good and he feels fine otherwise. He is only having 1-2 loose bowel movements daily and is no longer taking imodium.     Current Outpatient Prescriptions   Medication Sig Dispense Refill     zolpidem (AMBIEN) 10 MG tablet Take 1 tablet (10 mg) by mouth nightly as needed 60 tablet 1     LORazepam (ATIVAN) 0.5 MG tablet Take 1 tablet (0.5 mg) by mouth every 4 hours as needed (Anxiety, Nausea/Vomiting or Sleep) (Patient not taking: Reported on 1/15/2018) 30 tablet 2     prochlorperazine (COMPAZINE) 10 MG tablet Take 1 tablet (10 mg) by mouth every 6 hours as needed (Nausea/Vomiting) (Patient not taking: Reported on 1/22/2018) 30 tablet 2     enoxaparin (LOVENOX) 80 MG/0.8ML injection Inject 1.3 mLs (130 mg) Subcutaneous 2 times daily 60 Syringe 3     IBUPROFEN PO Take 600 mg by mouth every 4 hours as needed for moderate pain       [DISCONTINUED] dexamethasone (DECADRON) 4 MG tablet Take two tablets daily on days 2,3 and then 1 tablet daily days 4,5 6 tablet 1     ondansetron (ZOFRAN) 8 MG tablet Take 1 tablet (8 mg) by mouth every 8 hours as needed (Nausea/Vomiting) (Patient not taking: Reported on 2/5/2018) 30 tablet 2     lidocaine-prilocaine (EMLA) cream Apply topically as needed for moderate pain 30 g 3     multivitamin, therapeutic with minerals (MULTI-VITAMIN) TABS tablet Take 1 tablet by mouth daily       fish oil-omega-3 fatty acids 1000 MG capsule Take 2 g by mouth daily       timolol (TIMOPTIC) 0.5 % ophthalmic solution Place into both eyes daily       Physical Examination:  General: The patient is a pleasant male, in no acute distress.   /87 (BP Location: Right arm, Patient Position: Right side, Cuff Size: Adult Large)  Pulse 98  Temp 98  F (36.7  C) (Oral)  Wt (!) 164.6 kg (362 lb 12.8 oz)  SpO2 96%  BMI 41.93 kg/m2    Wt Readings from Last 10 Encounters:    02/09/18 (!) 164.6 kg (362 lb 12.8 oz)   02/05/18 (!) 164.8 kg (363 lb 4.8 oz)   01/22/18 (!) 161.3 kg (355 lb 9.6 oz)   01/08/18 (!) 163.3 kg (360 lb)   12/27/17 (!) 158.8 kg (350 lb)   12/12/17 (!) 159.2 kg (351 lb)   12/11/17 (!) 159.9 kg (352 lb 9.6 oz)   11/28/17 (!) 163.1 kg (359 lb 9.6 oz)   11/13/17 (!) 163.8 kg (361 lb 3.2 oz)   10/30/17 (!) 159.3 kg (351 lb 4.8 oz)     HEENT: EOMI, PERRL. Sclerae are anicteric.   Heart: Irregular rate and rhythm.   Lungs: Clear to auscultation bilaterally.   Abdomen: Obese, soft. Mixture of older and newer ecchymotic areas on right and left side of lower abdomen. Area surrounding umbilicus with faint erythema measuring about 10cm in diameter. Mild TTP in 4 oclock position around umbilicus. Umbilicus explored with cotton tip applicator. Some dried brown/red material in umbilicus, but no fluid. No tract or fistula. No palpable fluctuance or induration.  Extremities: Hyperpigmentation of right leg. Right leg a little more swollen than left but patient states this is chronic and unchanged.  Neuro: grossly non-focal  Skin: No other rashes, ecchymosis or petechiae except what is noted above.    Laboratory Data:  Results for KORI CHANEY (MRN [id number]) as of 2/9/2018 15:52   2/9/2018 10:25   Sodium 141   Potassium 3.8   Chloride 111 (H)   Carbon Dioxide 22   Urea Nitrogen 9   Creatinine 0.83   GFR Estimate >90   GFR Estimate If Black >90   Calcium 8.6   Anion Gap 8   Glucose 95   WBC 2.9 (L)   Hemoglobin 12.7 (L)   Hematocrit 38.0 (L)   Platelet Count 147 (L)   RBC Count 4.13 (L)   MCV 92   MCH 30.8   MCHC 33.4   RDW 13.8   Diff Method Automated Method   % Neutrophils 58.3   % Lymphocytes 29.6   % Monocytes 10.1   % Eosinophils 1.0   % Basophils 0.7   % Immature Granulocytes 0.3   Nucleated RBCs 0   Absolute Neutrophil 1.7   Absolute Lymphocytes 0.9   Absolute Monocytes 0.3   Absolute Eosinophils 0.0   Absolute Basophils 0.0   Abs Immature Granulocytes 0.0   Absolute  Nucleated RBC 0.0     Imaging:  CT A/P w/contrast: Preliminary report with no abscess or fistula. Some inflammation of skin/sot tissue.    Assessment and Plan:    Cellulitis, periumbilical: Afebrile. Leukopenia but ANC has recovered to 1.7. Possible he had an abscess that spontaneously opened and drained given history of pain and redness a few days ago. He had relief of pain after drainage from umbilicus was noted. He still has some faint erythema surrounding umbilicus and mild TTP as noted above. Will give Bactrim DS 1 tab BID x 7 days to cover MRSA. Patient is staying at [facility] and will be back on Monday. He will call if there is any increased redness, new drainage, bleeding, increased abdominal pain, fever, chills or any other concerning symptoms. Will have RN check area on Monday prior to Taxol and page me if any concerns.    Metastatic esophogeal cancer.  He had progression of disease by PET/CT in 12/2017.  He underwent biopsy for Foundation One testing which is currently pending. We should have results very soon. Plan was changed to Taxol/Cyramza and Cyramza was started on C1D15 recent PE.  He has tolerated Taxol fairly well and first dose of Cyramza given on 1/22/18. WBC 1.9 down from 2.6 last week.  ANC 0.9 down from 1.1, 2.1 and 2.1 during previous weeks. Discussed this with Dr. Dee patient feeling well, therefore will HOLD Taxol this week, however continue with Cyramza today. Cyramza given every 2 weeks and therefore if counts recover, only Taxol next week on 2/12/18 and then both Taxol/Cyramza on 2/19/18.   --2/12 C2D8 (Taxol only)  --2/19 Nicole Sutherland PA-C and C2D15  --3/6 CTcap, Dr Dee    Syncope: Occurred during blood draw a few weeks ago. Workup unremarkable and proceeded with chemotherapy.  No symptoms during blood draw 2 weeks ago or today and has been asymptomatic since this episode.  Vital signs stable today.  Will continue to monitor.     Diarrhea: Likely from Taxol. Improved. On  imodium prn.    Atrial fibrillation: Evaluated by cardiology on 8/21/17.  Heart rate in clinic today 95 and asymptomatic.  Currently on Lovenox.  --Will refer to cardiology if becomes symptomatic     H/o PE: This was originally identified on staging in 07/2017.  He was on Xarelto.  At his restaging evaluation he had progressive symptoms with more clot burden and was started on Lovenox 1 mg/kg twice daily. Hep 10a was 1.02 on 12/26/17 and 1.28 on 124/18.  Lovenox was reduced to 80 mg twice a day and repeat heparin 10 A level 0.81 on 1/29/18.  We will continue this dosing.  Minimal bruising on lower abdomen.  Will closely monitor.    Peripheral neuropathy: Chronic since FOLFOX therapy and slightly increased neuropathy on the fingertips of all 5 fingers.  Grade 1 at this time. Continue to monitor.    Proteinuria: Urine with 30 mg/dL on 2/5 up from 10 mg/dL.  We will continue to monitor, but no intervention at this time    Foundation One: Still in process.    Amparo Sow PA-C  [facility]  [address] [place] [address]  [phone]

## 2018-02-12 ENCOUNTER — APPOINTMENT (OUTPATIENT)
Dept: LAB | Facility: CLINIC | Age: 58
End: 2018-02-12
Attending: PHYSICIAN ASSISTANT
Payer: COMMERCIAL

## 2018-02-12 ENCOUNTER — CARE COORDINATION (OUTPATIENT)
Dept: ONCOLOGY | Facility: CLINIC | Age: 58
End: 2018-02-12

## 2018-02-12 ENCOUNTER — ONCOLOGY VISIT (OUTPATIENT)
Dept: ONCOLOGY | Facility: CLINIC | Age: 58
End: 2018-02-12
Attending: INTERNAL MEDICINE
Payer: COMMERCIAL

## 2018-02-12 ENCOUNTER — INFUSION THERAPY VISIT (OUTPATIENT)
Dept: ONCOLOGY | Facility: CLINIC | Age: 58
End: 2018-02-12
Attending: PHYSICIAN ASSISTANT
Payer: COMMERCIAL

## 2018-02-12 VITALS
HEIGHT: 78 IN | RESPIRATION RATE: 16 BRPM | TEMPERATURE: 97.9 F | SYSTOLIC BLOOD PRESSURE: 122 MMHG | BODY MASS INDEX: 36.45 KG/M2 | WEIGHT: 315 LBS | HEART RATE: 80 BPM | DIASTOLIC BLOOD PRESSURE: 86 MMHG | OXYGEN SATURATION: 95 %

## 2018-02-12 DIAGNOSIS — C15.5 CANCER OF DISTAL THIRD OF ESOPHAGUS (H): Primary | ICD-10-CM

## 2018-02-12 DIAGNOSIS — L03.319 CELLULITIS AND ABSCESS OF TRUNK: Primary | ICD-10-CM

## 2018-02-12 DIAGNOSIS — L02.219 CELLULITIS AND ABSCESS OF TRUNK: Primary | ICD-10-CM

## 2018-02-12 LAB
BASOPHILS # BLD AUTO: 0 10E9/L (ref 0–0.2)
BASOPHILS NFR BLD AUTO: 0.7 %
DIFFERENTIAL METHOD BLD: ABNORMAL
EOSINOPHIL # BLD AUTO: 0 10E9/L (ref 0–0.7)
EOSINOPHIL NFR BLD AUTO: 1.4 %
ERYTHROCYTE [DISTWIDTH] IN BLOOD BY AUTOMATED COUNT: 13.9 % (ref 10–15)
HCT VFR BLD AUTO: 38.3 % (ref 40–53)
HGB BLD-MCNC: 12.5 G/DL (ref 13.3–17.7)
IMM GRANULOCYTES # BLD: 0 10E9/L (ref 0–0.4)
IMM GRANULOCYTES NFR BLD: 0.7 %
LYMPHOCYTES # BLD AUTO: 0.9 10E9/L (ref 0.8–5.3)
LYMPHOCYTES NFR BLD AUTO: 33.3 %
MCH RBC QN AUTO: 30.1 PG (ref 26.5–33)
MCHC RBC AUTO-ENTMCNC: 32.6 G/DL (ref 31.5–36.5)
MCV RBC AUTO: 92 FL (ref 78–100)
MONOCYTES # BLD AUTO: 0.2 10E9/L (ref 0–1.3)
MONOCYTES NFR BLD AUTO: 8.6 %
NEUTROPHILS # BLD AUTO: 1.5 10E9/L (ref 1.6–8.3)
NEUTROPHILS NFR BLD AUTO: 55.3 %
NRBC # BLD AUTO: 0 10*3/UL
NRBC BLD AUTO-RTO: 0 /100
PLATELET # BLD AUTO: 147 10E9/L (ref 150–450)
RBC # BLD AUTO: 4.15 10E12/L (ref 4.4–5.9)
WBC # BLD AUTO: 2.8 10E9/L (ref 4–11)

## 2018-02-12 PROCEDURE — 96375 TX/PRO/DX INJ NEW DRUG ADDON: CPT

## 2018-02-12 PROCEDURE — 25000125 ZZHC RX 250: Mod: ZF | Performed by: PHYSICIAN ASSISTANT

## 2018-02-12 PROCEDURE — 25000128 H RX IP 250 OP 636: Mod: ZF | Performed by: PHYSICIAN ASSISTANT

## 2018-02-12 PROCEDURE — 99214 OFFICE O/P EST MOD 30 MIN: CPT | Mod: ZP | Performed by: PHYSICIAN ASSISTANT

## 2018-02-12 PROCEDURE — 85025 COMPLETE CBC W/AUTO DIFF WBC: CPT | Performed by: PHYSICIAN ASSISTANT

## 2018-02-12 PROCEDURE — 25000132 ZZH RX MED GY IP 250 OP 250 PS 637: Mod: ZF | Performed by: PHYSICIAN ASSISTANT

## 2018-02-12 PROCEDURE — 96413 CHEMO IV INFUSION 1 HR: CPT

## 2018-02-12 RX ORDER — HEPARIN SODIUM (PORCINE) LOCK FLUSH IV SOLN 100 UNIT/ML 100 UNIT/ML
500 SOLUTION INTRAVENOUS ONCE
Status: COMPLETED | OUTPATIENT
Start: 2018-02-12 | End: 2018-02-12

## 2018-02-12 RX ORDER — DIPHENHYDRAMINE HCL 25 MG
50 CAPSULE ORAL ONCE
Status: COMPLETED | OUTPATIENT
Start: 2018-02-12 | End: 2018-02-12

## 2018-02-12 RX ORDER — HEPARIN SODIUM (PORCINE) LOCK FLUSH IV SOLN 100 UNIT/ML 100 UNIT/ML
5 SOLUTION INTRAVENOUS EVERY 8 HOURS
Status: DISCONTINUED | OUTPATIENT
Start: 2018-02-12 | End: 2018-02-12 | Stop reason: HOSPADM

## 2018-02-12 RX ADMIN — SODIUM CHLORIDE, PRESERVATIVE FREE 500 UNITS: 5 INJECTION INTRAVENOUS at 10:41

## 2018-02-12 RX ADMIN — PACLITAXEL 240 MG: 6 INJECTION, SOLUTION INTRAVENOUS at 09:36

## 2018-02-12 RX ADMIN — DEXAMETHASONE SODIUM PHOSPHATE 20 MG: 10 INJECTION, SOLUTION INTRAMUSCULAR; INTRAVENOUS at 09:31

## 2018-02-12 RX ADMIN — FAMOTIDINE 20 MG: 20 INJECTION, SOLUTION INTRAVENOUS at 09:08

## 2018-02-12 RX ADMIN — SODIUM CHLORIDE, PRESERVATIVE FREE 5 ML: 5 INJECTION INTRAVENOUS at 08:32

## 2018-02-12 RX ADMIN — DIPHENHYDRAMINE HYDROCHLORIDE 50 MG: 25 CAPSULE ORAL at 09:07

## 2018-02-12 RX ADMIN — SODIUM CHLORIDE 250 ML: 9 INJECTION, SOLUTION INTRAVENOUS at 09:07

## 2018-02-12 ASSESSMENT — PAIN SCALES - GENERAL: PAINLEVEL: NO PAIN (0)

## 2018-02-12 NOTE — PROGRESS NOTES
Spoke with Aida at [facility], PDL-1 results are complete and being faxed today, the remaining results are being finalized and will be ready on 2/15.

## 2018-02-12 NOTE — PROGRESS NOTES
Infusion Nursing Note:  Reuben Padilla presents today for Cycle 2 Day 8 Taxol.    Patient seen by provider today: No   present during visit today: Not Applicable.    Note: patient denies any new complaints today prior to infusion.  Pt requesting abdominal CT results.  Paged SERGIO Bonilla. SERGIO Barker returned call and will come to infusion prior to patient d/c to discuss results per conversation.  Amparo and Nicole here and reviewed CT results with patient.      Intravenous Access:  Implanted Port.    Treatment Conditions:  Lab Results   Component Value Date    HGB 12.5 02/12/2018     Lab Results   Component Value Date    WBC 2.8 02/12/2018      Lab Results   Component Value Date    ANEU 1.5 02/12/2018     Lab Results   Component Value Date     02/12/2018      Results reviewed, labs MET treatment parameters, ok to proceed with treatment.    Post Infusion Assessment:  Patient tolerated infusion without incident.  Blood return noted pre and post infusion.  Site patent and intact, free from redness, edema or discomfort.  No evidence of extravasations.  Access discontinued per protocol.    Discharge Plan:   Patient declined prescription refills.  Discharge instructions reviewed with: Patient.  Patient and/or family verbalized understanding of discharge instructions and all questions answered.  AVS to patient via [employer].  Patient will return 02/19/2018 for next lab, provider, and chemotherapy appointment.   Patient discharged in stable condition accompanied by: self.  Departure Mode: Ambulatory.    Elizabeth Cochran RN

## 2018-02-12 NOTE — PROGRESS NOTES
2/12/18-    S: metastatic esophageal CA, here for weekly Taxol.  Last week after Cyramza, developed tenderness around the umbilicus Tues-Wed and then woke up with drainage.  Started antibiotics, drainage stopped and no pain, fever, drainage over the weekend.     O: VSS     2/12/2018 08:36   WBC 2.8 (L)   Hemoglobin 12.5 (L)   Hematocrit 38.3 (L)   Platelet Count 147 (L)   RBC Count 4.15 (L)   MCV 92   Absolute Neutrophil 1.5 (L)     Umbilicus free of erythema or tenderness.  No open lesion, but small 1-2 mm scab noted at 6 oclock position.     IMPRESSION:   In this patient with history of esophageal carcinoma:  1. There is stable concentric esophageal wall thickening of the distal  third of the esophagus/GE junction.  2. No change in the size of the soft tissue mass adjacent to the  diaphragmatic pleura encasing the splenic artery and abutting the  celiac trunk.  3. Stable mesenteric haziness and prominent mesenteric lymph nodes,  likely posttreatment changes.  4. New fat stranding and thickening of the umbilicus. No drainable  Fluid.    A/P: metastatic esophageal cancer, currently on Taxol/cyramza with recent infection of the umbilicus, likely neutropenia induced.  Completely symptom free now with Bactrim.  Reviewed the CT scan with Reuben today- his cancer is all stable.  Infection noted on the CT, now clinically improved.  OK to continue with Taxol today.  Will likely add Neulasta in next week after DAy 15.      Reuben hasn't had prior surgery in the umbilicus before (no laproscopy) as wound dehiscence from the cyramza was contemplated, however likely was just infected as the ANC dropped from the Taxol.     Nicole Sutherland PA-C

## 2018-02-12 NOTE — MR AVS SNAPSHOT
After Visit Summary   2/12/2018    Reuben Padilal    MRN: [id number]           Patient Information     Date Of Birth          1960        Visit Information        Provider Department      2/12/2018 8:30 AM  19 ATC;  ONCOLOGY INFUSION [facility]        Today's Diagnoses     Cancer of distal third of esophagus (H)    -  1      Care Instructions    Contact Numbers  [facility] Nurse Triage: [phone]  After Hours Nurse Line:  [phone]    Please call the [facility] Nurse Triage line or after hours number if you experience a temperature greater than or equal to 100.5, shaking chills, have uncontrolled nausea, vomiting and/or diarrhea, dizziness, shortness of breath, chest pain, bleeding, unexplained bruising, or if you have any other new/concerning symptoms, questions or concerns.     If you are having any concerning symptoms or wish to speak to a provider before your next infusion visit, please call your care coordinator or triage to notify them so we can adequately serve you.     If you need a refill on a narcotic prescription or other medication, please call triage before your infusion appointment.           February 2018 Sunday Monday Tuesday Wednesday Thursday Friday Saturday                       1     2     3       4     5     [facility] MASONIC LAB DRAW    9:30 AM   (15 min.)   [facility] LAB DRAW   [facility] Lab Draw     [facility] RETURN    9:55 AM   (50 min.)   Loraine Sutherland PA-C   M [facility]     [facility] ONC INFUSION 180   11:00 AM   (180 min.)    ONCOLOGY INFUSION   [facility] 6     [facility] RETURN WITH ROOM    7:45 AM   (60 min.)   Dao Lacy PsyD   [facility] 7     8     9     [facility] LAB DRAW   10:15 AM   (15 min.)    MASONIC LAB DRAW   [facility] Lab Draw     [facility] RETURN   10:25 AM   (50 min.)   Amparo Sow PA   M [facility]     CT ABDOMEN PELVIS W     4:45 PM   (20 min.)   UCCT1   [facility] CT 10       11     12     [facility] MASONIC LAB DRAW    8:00 AM   (15 min.)   [facility]ON[facility] LAB DRAW   [facility] Masonic Lab Draw     [facility] ONC INFUSION 120    8:30 AM   (120 min.)   UC ONCOLOGY INFUSION   [facility] 13     14     15     16     17       18     19     [facility] MASONIC LAB DRAW   11:15 AM   (15 min.)   [facility]     [facility] RETURN   11:35 AM   (50 min.)   Loraine Sutherland PA-C   [facility]     [facility] ONC INFUSION 180   12:30 PM   (180 min.)   UC ONCOLOGY INFUSION   [facility] 20     21     22     23     24       25     26     27     28                               March 2018 Sunday Monday Tuesday Wednesday Thursday Friday Saturday                       1     2     3       4     5     6     [facility] MASON[facility] LAB DRAW    9:30 AM   (15 min.)   [facility]ONIC LAB DRAW   [facility] Lab Draw     CT CHEST/ABDOMEN/PELVIS W    9:45 AM   (20 min.)   UCCT2   [facility] CT     UMP RETURN   12:15 PM   (30 min.)   Kadi Dee MD   [facility] 7     8     9     10       11     12     13     14     15     16     17       18     19     20     21     22     23     24       25     26     27     28     29     30     31                  Lab Results:  Recent Results (from the past 12 hour(s))   CBC with platelets differential    Collection Time: 02/12/18  8:36 AM   Result Value Ref Range    WBC 2.8 (L) 4.0 - 11.0 10e9/L    RBC Count 4.15 (L) 4.4 - 5.9 10e12/L    Hemoglobin 12.5 (L) 13.3 - 17.7 g/dL    Hematocrit 38.3 (L) 40.0 - 53.0 %    MCV 92 78 - 100 fl    MCH 30.1 26.5 - 33.0 pg    MCHC 32.6 31.5 - 36.5 g/dL    RDW 13.9 10.0 - 15.0 %    Platelet Count 147 (L) 150 - 450 10e9/L    Diff Method Automated Method     % Neutrophils 55.3 %    % Lymphocytes 33.3 %    % Monocytes 8.6 %    % Eosinophils 1.4 %    % Basophils 0.7 %    % Immature Granulocytes 0.7 %     Nucleated RBCs 0 0 /100    Absolute Neutrophil 1.5 (L) 1.6 - 8.3 10e9/L    Absolute Lymphocytes 0.9 0.8 - 5.3 10e9/L    Absolute Monocytes 0.2 0.0 - 1.3 10e9/L    Absolute Eosinophils 0.0 0.0 - 0.7 10e9/L    Absolute Basophils 0.0 0.0 - 0.2 10e9/L    Abs Immature Granulocytes 0.0 0 - 0.4 10e9/L    Absolute Nucleated RBC 0.0                Follow-ups after your visit        Your next 10 appointments already scheduled     Feb 19, 2018 11:15 AM CST   Masonic Lab Draw with UC MASONIC LAB DRAW   [facility] Masonic Lab Draw ([facility])    [address]  [address]  [facility] [address]   [phone]            Feb 19, 2018 11:50 AM CST   (Arrive by 11:35 AM)   Return Visit with Loraine Sutherland PA-C   [facility] ([facility])    [address] [place] [address]  [address]  [facility] [address]   [phone]            Feb 19, 2018 12:30 PM CST   Infusion 180 with UC ONCOLOGY INFUSION, UC 30 ATC   [facility] ([facility])    [address]  [address]  [facility] [address]   [phone]            Mar 06, 2018  9:30 AM CST   Masonic Lab Draw with UC MASONIC LAB DRAW   [facility] ([facility])    [address] [place] [address]  [address]  [facility] [address]   [phone]            Mar 06, 2018 10:00 AM CST   (Arrive by 9:45 AM)   CT CHEST/ABDOMEN/PELVIS W CONTRAST with UCCT2   [facility] ([facility])    [address] [place] [address]  1st Floor  [facility] [address] [phone]           Please bring any scans or X-rays taken at other hospitals, if similar tests were done. Also bring a list of your medicines, including vitamins, minerals and over-the-counter drugs. It is safest to leave personal items at home.  Be sure to tell your doctor:   If you have any allergies.   If there s any chance you are pregnant.   If  you are breastfeeding.  You may have contrast for this exam. To prepare:   Do not eat or drink for 2 hours before your exam. If you need to take medicine, you may take it with small sips of water. (We may ask you to take liquid medicine as well.)   The day before your exam, drink extra fluids at least six 8-ounce glasses (unless your doctor tells you to restrict your fluids).   You will be given instructions on how to drink the contrast.  Patients over 70 or patients with diabetes or kidney problems:   If you haven t had a blood test (creatinine test) within the last 30 days, the Cardiologist/Radiologist may require you to get this test prior to your exam.  If you have diabetes:   Continue to take your metformin medication on the day of your exam  Please wear loose clothing, such as a sweat suit or jogging clothes. Avoid snaps, zippers and other metal. We may ask you to undress and put on a hospital gown.  If you have any questions, please call the Imaging Department where you will have your exam.            Mar 06, 2018 12:30 PM CST   (Arrive by 12:15 PM)   Return Visit with Kadi Dee MD   [facility] ([facility])    [address]  [address]  [facility] [address] [phone]              Who to contact     If you have questions or need follow up information about today's clinic visit or your schedule please contact [facility] directly at [phone].  Normal or non-critical lab and imaging results will be communicated to you by MyChart, letter or phone within 4 business days after the clinic has received the results. If you do not hear from us within 7 days, please contact the clinic through MyChart or phone. If you have a critical or abnormal lab result, we will notify you by phone as soon as possible.  Submit refill requests through [employer] or call your pharmacy and they will forward the refill request to us. Please allow 3  "business days for your refill to be completed.          Additional Information About Your Visit        MyChart Information     [employer] gives you secure access to your electronic health record. If you see a primary care provider, you can also send messages to your care team and make appointments. If you have questions, please call your primary care clinic.  If you do not have a primary care provider, please call [phone] and they will assist you.        Care EveryWhere ID     This is your Care EveryWhere ID. This could be used by other organizations to access your [place] medical records  [id number]        Your Vitals Were     Pulse Temperature Respirations Height Pulse Oximetry BMI (Body Mass Index)    80 97.9  F (36.6  C) (Oral) 16 1.981 m (6' 5.99\") 95% 41.49 kg/m2       Blood Pressure from Last 3 Encounters:   02/12/18 122/86   02/09/18 125/87   02/05/18 126/72    Weight from Last 3 Encounters:   02/12/18 (!) 162.8 kg (358 lb 14.4 oz)   02/09/18 (!) 164.6 kg (362 lb 12.8 oz)   02/05/18 (!) 164.8 kg (363 lb 4.8 oz)              We Performed the Following     CBC with platelets differential        Primary Care Provider Fax #    Physician No Ref-Primary [phone]       No address on file        Equal Access to Services     [facility] : Hadii aad hunter hadasho Sojose antonioali, waaxda luqadaha, qaybta kaalmada adeegyada, geraldo washington . So [facility] [phone].    ATENCIÓN: Si habla español, tiene a alamo disposición servicios gratuitos de asistencia lingüística. Llame al [phone].    We comply with applicable federal civil rights laws and [place] laws. We do not discriminate on the basis of race, color, national origin, age, disability, sex, sexual orientation, or gender identity.            Thank you!     Thank you for choosing [facility]  for your care. Our goal is always to provide you with excellent care. Hearing back from our patients is one way we can continue to " improve our services. Please take a few minutes to complete the written survey that you may receive in the mail after your visit with us. Thank you!             Your Updated Medication List - Protect others around you: Learn how to safely use, store and throw away your medicines at www.disposemymeds.org.          This list is accurate as of 2/12/18 10:51 AM.  Always use your most recent med list.                   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)       LORazepam 0.5 MG tablet    ATIVAN    30 tablet    Take 1 tablet (0.5 mg) by mouth every 4 hours as needed (Anxiety, Nausea/Vomiting or Sleep)    Cancer of distal third of esophagus (H)       Multi-vitamin Tabs tablet      Take 1 tablet by mouth daily        ondansetron 8 MG tablet    ZOFRAN    30 tablet    Take 1 tablet (8 mg) by mouth every 8 hours as needed (Nausea/Vomiting)    Cancer of distal third of esophagus (H)       prochlorperazine 10 MG tablet    COMPAZINE    30 tablet    Take 1 tablet (10 mg) by mouth every 6 hours as needed (Nausea/Vomiting)    Cancer of distal third of esophagus (H)       sulfamethoxazole-trimethoprim 800-160 MG per tablet    BACTRIM DS/SEPTRA DS    14 tablet    Take 1 tablet by mouth 2 times daily for 7 days    Cellulitis and abscess of trunk       timolol 0.5 % ophthalmic solution    TIMOPTIC     Place into both eyes daily    Cancer of distal third of esophagus (H)       zolpidem 10 MG tablet    AMBIEN    60 tablet    Take 1 tablet (10 mg) by mouth nightly as  needed    Metastasis from gastric cancer (H)

## 2018-02-12 NOTE — MR AVS SNAPSHOT
After Visit Summary   2/12/2018    Reuben Padilla    MRN: [id number]           Patient Information     Date Of Birth          1960        Visit Information        Provider Department      2/12/2018 10:00 AM Loraine Sutherland PA-C [facility]        Today's Diagnoses     Cellulitis and abscess of trunk    -  1       Follow-ups after your visit        Your next 10 appointments already scheduled     Feb 19, 2018 11:15 AM CST   Masonic Lab Draw with UC [facility]IC LAB DRAW   [facility]    [address] [place] [address]  [address]  [facility] [address]   [phone]            Feb 19, 2018 11:50 AM CST   (Arrive by 11:35 AM)   Return Visit with Loraine Sutherland PA-C   [facility] ([facility])    [address] [place] [address]  [address]  [facility] [address]   [phone]            Feb 19, 2018 12:30 PM CST   Infusion 180 with UC ONCOLOGY INFUSION, UC 30 ATC   [facility] ([facility])    [address] [place] [address]  [address]  [facility] [address]   [phone]            Mar 06, 2018  9:30 AM CST   Masonic Lab Draw with UC [facility] LAB DRAW   [facility]    [address] [place] [address]  [address]  [facility] [address]   [phone]            Mar 06, 2018 10:00 AM CST   (Arrive by 9:45 AM)   CT CHEST/ABDOMEN/PELVIS W CONTRAST with UCCT2   [facility] ([facility])    [address] [place] [address]  1st Floor  [facility] [address]   [phone]           Please bring any scans or X-rays taken at other hospitals, if similar tests were done. Also bring a list of your medicines, including vitamins, minerals and over-the-counter drugs. It is safest to leave personal items at home.  Be sure to tell your doctor:   If you have any allergies.   If there s any chance you are  pregnant.   If you are breastfeeding.  You may have contrast for this exam. To prepare:   Do not eat or drink for 2 hours before your exam. If you need to take medicine, you may take it with small sips of water. (We may ask you to take liquid medicine as well.)   The day before your exam, drink extra fluids at least six 8-ounce glasses (unless your doctor tells you to restrict your fluids).   You will be given instructions on how to drink the contrast.  Patients over 70 or patients with diabetes or kidney problems:   If you haven t had a blood test (creatinine test) within the last 30 days, the Cardiologist/Radiologist may require you to get this test prior to your exam.  If you have diabetes:   Continue to take your metformin medication on the day of your exam  Please wear loose clothing, such as a sweat suit or jogging clothes. Avoid snaps, zippers and other metal. We may ask you to undress and put on a hospital gown.  If you have any questions, please call the Imaging Department where you will have your exam.            Mar 06, 2018 12:30 PM CST   (Arrive by 12:15 PM)   Return Visit with Kadi Dee MD   [facility] ([facility])    [address]  [address]  [facility] [address] [phone]              Who to contact     If you have questions or need follow up information about today's clinic visit or your schedule please contact [facility] directly at [phone].  Normal or non-critical lab and imaging results will be communicated to you by MyChart, letter or phone within 4 business days after the clinic has received the results. If you do not hear from us within 7 days, please contact the clinic through MyChart or phone. If you have a critical or abnormal lab result, we will notify you by phone as soon as possible.  Submit refill requests through [employer] or call your pharmacy and they will forward the refill request to us.  Please allow 3 business days for your refill to be completed.          Additional Information About Your Visit        MyChart Information     [employer] gives you secure access to your electronic health record. If you see a primary care provider, you can also send messages to your care team and make appointments. If you have questions, please call your primary care clinic.  If you do not have a primary care provider, please call [phone] and they will assist you.        Care EveryWhere ID     This is your Care EveryWhere ID. This could be used by other organizations to access your [place] medical records  [id number]         Blood Pressure from Last 3 Encounters:   02/12/18 122/86   02/09/18 125/87   02/05/18 126/72    Weight from Last 3 Encounters:   02/12/18 (!) 162.8 kg (358 lb 14.4 oz)   02/09/18 (!) 164.6 kg (362 lb 12.8 oz)   02/05/18 (!) 164.8 kg (363 lb 4.8 oz)              Today, you had the following     No orders found for display       Primary Care Provider Fax #    Physician No Ref-Primary [phone]       No address on file        Equal Access to Services     [facility] : Hadii antonio navarroo Sonigel, waaxda lumegganadaha, qaybta kaalmada ashleigh, geraldo washington . So [facility] [phone].    ATENCIÓN: Si habla español, tiene a alamo disposición servicios gratuitos de asistencia lingüística. LlHolzer Medical Center – Jackson [phone].    We comply with applicable federal civil rights laws and [place] laws. We do not discriminate on the basis of race, color, national origin, age, disability, sex, sexual orientation, or gender identity.            Thank you!     Thank you for choosing [facility]  for your care. Our goal is always to provide you with excellent care. Hearing back from our patients is one way we can continue to improve our services. Please take a few minutes to complete the written survey that you may receive in the mail after your visit with us. Thank you!              Your Updated Medication List - Protect others around you: Learn how to safely use, store and throw away your medicines at www.disposemymeds.org.          This list is accurate as of 2/12/18 11:34 AM.  Always use your most recent med list.                   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)       LORazepam 0.5 MG tablet    ATIVAN    30 tablet    Take 1 tablet (0.5 mg) by mouth every 4 hours as needed (Anxiety, Nausea/Vomiting or Sleep)    Cancer of distal third of esophagus (H)       Multi-vitamin Tabs tablet      Take 1 tablet by mouth daily        ondansetron 8 MG tablet    ZOFRAN    30 tablet    Take 1 tablet (8 mg) by mouth every 8 hours as needed (Nausea/Vomiting)    Cancer of distal third of esophagus (H)       prochlorperazine 10 MG tablet    COMPAZINE    30 tablet    Take 1 tablet (10 mg) by mouth every 6 hours as needed (Nausea/Vomiting)    Cancer of distal third of esophagus (H)       sulfamethoxazole-trimethoprim 800-160 MG per tablet    BACTRIM DS/SEPTRA DS    14 tablet    Take 1 tablet by mouth 2 times daily for 7 days    Cellulitis and abscess of trunk       timolol 0.5 % ophthalmic solution    TIMOPTIC     Place into both eyes daily    Cancer of distal third of esophagus (H)       zolpidem 10 MG tablet    AMBIEN    60 tablet    Take 1 tablet (10 mg) by mouth nightly as needed    Metastasis from gastric cancer (H)

## 2018-02-12 NOTE — PROGRESS NOTES
"Confidential Summary of Oncology Psychology Followup Visit    Reuben Padilla is a 57 year old male who presents for depressed mood secondary to cancer prognosis.   The patient was seen for a 42 minute appointment on 2/6/2018.    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     Objective: Nov-verbal communication matched his verbal reports. Good energy in his eyes and voice.     Diagnosis:   Encounter Diagnosis   Name Primary?     Adjustment disorder with depressed mood Yes     Recommendations/Plan:  1. Continue to meet with his family and friends  2. Return for follow-up as needed.     Thank you for this opportunity to participate in your care of this patient.    Dao Lacy Psy.D., L.P.  Director, Oncology Supportive Care   "

## 2018-02-12 NOTE — PATIENT INSTRUCTIONS
Contact Numbers  [facility] Nurse Triage: [phone]  After Hours Nurse Line:  [phone]    Please call the [facility] Nurse Triage line or after hours number if you experience a temperature greater than or equal to 100.5, shaking chills, have uncontrolled nausea, vomiting and/or diarrhea, dizziness, shortness of breath, chest pain, bleeding, unexplained bruising, or if you have any other new/concerning symptoms, questions or concerns.     If you are having any concerning symptoms or wish to speak to a provider before your next infusion visit, please call your care coordinator or triage to notify them so we can adequately serve you.     If you need a refill on a narcotic prescription or other medication, please call triage before your infusion appointment.           February 2018 Sunday Monday Tuesday Wednesday Thursday Friday Saturday                       1     2     3       4     5     [facility]    9:30 AM   (15 min.)    [facility]   [facility]     [facility] RETURN    9:55 AM   (50 min.)   Loraine Sutherland PA-C   [facility]     [facility] ONC INFUSION 180   11:00 AM   (180 min.)    ONCOLOGY INFUSION   [facility] 6     [facility] RETURN WITH ROOM    7:45 AM   (60 min.)   Dao Lacy PsyD   [facility] 7     8     9     [facility] DRAW   10:15 AM   (15 min.)    [facility] DRAW   [facility]     [facility] RETURN   10:25 AM   (50 min.)   Amparo Sow PA   [facility]     CT ABDOMEN PELVIS W    4:45 PM   (20 min.)   UCCT1   [facility] CT 10       11     12     [facility] DRAW    8:00 AM   (15 min.)    [facility] DRAW   [facility]     [facility] ONC INFUSION 120    8:30 AM   (120 min.)    ONCOLOGY INFUSION   [facility] 13     14     15     16     17       18     19     [facility] DRAW   11:15 AM   (15 min.)   [facility] LAB  DRAW   [facility] Lab Draw     [facility] RETURN   11:35 AM   (50 min.)   Loraine Sutherland PA-C   [facility]     [facility] ONC INFUSION 180   12:30 PM   (180 min.)    ONCOLOGY INFUSION   [facility] 20     21     22     23     24       25     26     27     28 March 2018 Sunday Monday Tuesday Wednesday Thursday Friday Saturday                       1     2     3       4     5     6     [facility] LAB DRAW    9:30 AM   (15 min.)   [facility] DRAW   [facility] Lab Draw     CT CHEST/ABDOMEN/PELVIS W    9:45 AM   (20 min.)   UCCT2   [facility] CT     [facility] RETURN   12:15 PM   (30 min.)   Kadi Dee MD   [facility] 7     8     9     10       11     12     13     14     15     16     17       18     19     20     21     22     23     24       25     26     27     28     29     30     31                  Lab Results:  Recent Results (from the past 12 hour(s))   CBC with platelets differential    Collection Time: 02/12/18  8:36 AM   Result Value Ref Range    WBC 2.8 (L) 4.0 - 11.0 10e9/L    RBC Count 4.15 (L) 4.4 - 5.9 10e12/L    Hemoglobin 12.5 (L) 13.3 - 17.7 g/dL    Hematocrit 38.3 (L) 40.0 - 53.0 %    MCV 92 78 - 100 fl    MCH 30.1 26.5 - 33.0 pg    MCHC 32.6 31.5 - 36.5 g/dL    RDW 13.9 10.0 - 15.0 %    Platelet Count 147 (L) 150 - 450 10e9/L    Diff Method Automated Method     % Neutrophils 55.3 %    % Lymphocytes 33.3 %    % Monocytes 8.6 %    % Eosinophils 1.4 %    % Basophils 0.7 %    % Immature Granulocytes 0.7 %    Nucleated RBCs 0 0 /100    Absolute Neutrophil 1.5 (L) 1.6 - 8.3 10e9/L    Absolute Lymphocytes 0.9 0.8 - 5.3 10e9/L    Absolute Monocytes 0.2 0.0 - 1.3 10e9/L    Absolute Eosinophils 0.0 0.0 - 0.7 10e9/L    Absolute Basophils 0.0 0.0 - 0.2 10e9/L    Abs Immature Granulocytes 0.0 0 - 0.4 10e9/L    Absolute Nucleated RBC 0.0

## 2018-02-13 LAB — LAB SCANNED RESULT: NORMAL

## 2018-02-19 ENCOUNTER — ONCOLOGY VISIT (OUTPATIENT)
Dept: ONCOLOGY | Facility: CLINIC | Age: 58
End: 2018-02-19
Attending: INTERNAL MEDICINE
Payer: COMMERCIAL

## 2018-02-19 VITALS
OXYGEN SATURATION: 96 % | BODY MASS INDEX: 41.06 KG/M2 | TEMPERATURE: 97.5 F | RESPIRATION RATE: 16 BRPM | WEIGHT: 315 LBS | DIASTOLIC BLOOD PRESSURE: 91 MMHG | SYSTOLIC BLOOD PRESSURE: 137 MMHG | HEART RATE: 95 BPM

## 2018-02-19 VITALS
HEART RATE: 95 BPM | BODY MASS INDEX: 41.06 KG/M2 | SYSTOLIC BLOOD PRESSURE: 137 MMHG | RESPIRATION RATE: 16 BRPM | DIASTOLIC BLOOD PRESSURE: 91 MMHG | OXYGEN SATURATION: 96 % | WEIGHT: 315 LBS | TEMPERATURE: 97.5 F

## 2018-02-19 DIAGNOSIS — C15.5 CANCER OF DISTAL THIRD OF ESOPHAGUS (H): Primary | ICD-10-CM

## 2018-02-19 LAB
ALBUMIN UR-MCNC: ABNORMAL MG/DL
BASOPHILS # BLD AUTO: 0 10E9/L (ref 0–0.2)
BASOPHILS NFR BLD AUTO: 0.4 %
DIFFERENTIAL METHOD BLD: ABNORMAL
EOSINOPHIL # BLD AUTO: 0 10E9/L (ref 0–0.7)
EOSINOPHIL NFR BLD AUTO: 1.6 %
ERYTHROCYTE [DISTWIDTH] IN BLOOD BY AUTOMATED COUNT: 13.7 % (ref 10–15)
HCT VFR BLD AUTO: 39.9 % (ref 40–53)
HGB BLD-MCNC: 12.9 G/DL (ref 13.3–17.7)
IMM GRANULOCYTES # BLD: 0 10E9/L (ref 0–0.4)
IMM GRANULOCYTES NFR BLD: 0.4 %
LYMPHOCYTES # BLD AUTO: 1 10E9/L (ref 0.8–5.3)
LYMPHOCYTES NFR BLD AUTO: 39 %
MCH RBC QN AUTO: 30.1 PG (ref 26.5–33)
MCHC RBC AUTO-ENTMCNC: 32.3 G/DL (ref 31.5–36.5)
MCV RBC AUTO: 93 FL (ref 78–100)
MONOCYTES # BLD AUTO: 0.1 10E9/L (ref 0–1.3)
MONOCYTES NFR BLD AUTO: 4.1 %
NEUTROPHILS # BLD AUTO: 1.3 10E9/L (ref 1.6–8.3)
NEUTROPHILS NFR BLD AUTO: 54.5 %
NRBC # BLD AUTO: 0 10*3/UL
NRBC BLD AUTO-RTO: 0 /100
PLATELET # BLD AUTO: 131 10E9/L (ref 150–450)
RBC # BLD AUTO: 4.28 10E12/L (ref 4.4–5.9)
WBC # BLD AUTO: 2.5 10E9/L (ref 4–11)

## 2018-02-19 PROCEDURE — 81003 URINALYSIS AUTO W/O SCOPE: CPT | Performed by: PHYSICIAN ASSISTANT

## 2018-02-19 PROCEDURE — 96375 TX/PRO/DX INJ NEW DRUG ADDON: CPT

## 2018-02-19 PROCEDURE — 96413 CHEMO IV INFUSION 1 HR: CPT

## 2018-02-19 PROCEDURE — 25000125 ZZHC RX 250: Mod: ZF | Performed by: PHYSICIAN ASSISTANT

## 2018-02-19 PROCEDURE — 25000128 H RX IP 250 OP 636: Mod: ZF | Performed by: INTERNAL MEDICINE

## 2018-02-19 PROCEDURE — 96417 CHEMO IV INFUS EACH ADDL SEQ: CPT

## 2018-02-19 PROCEDURE — 25000128 H RX IP 250 OP 636: Mod: ZF | Performed by: PHYSICIAN ASSISTANT

## 2018-02-19 PROCEDURE — 85025 COMPLETE CBC W/AUTO DIFF WBC: CPT | Performed by: PHYSICIAN ASSISTANT

## 2018-02-19 PROCEDURE — G0463 HOSPITAL OUTPT CLINIC VISIT: HCPCS | Mod: ZF

## 2018-02-19 PROCEDURE — 96377 APPLICATON ON-BODY INJECTOR: CPT | Mod: 59

## 2018-02-19 PROCEDURE — 25000132 ZZH RX MED GY IP 250 OP 250 PS 637: Mod: ZF | Performed by: PHYSICIAN ASSISTANT

## 2018-02-19 PROCEDURE — 99215 OFFICE O/P EST HI 40 MIN: CPT | Mod: ZP | Performed by: PHYSICIAN ASSISTANT

## 2018-02-19 RX ORDER — HEPARIN SODIUM (PORCINE) LOCK FLUSH IV SOLN 100 UNIT/ML 100 UNIT/ML
5 SOLUTION INTRAVENOUS ONCE
Status: COMPLETED | OUTPATIENT
Start: 2018-02-19 | End: 2018-02-19

## 2018-02-19 RX ORDER — HEPARIN SODIUM (PORCINE) LOCK FLUSH IV SOLN 100 UNIT/ML 100 UNIT/ML
500 SOLUTION INTRAVENOUS ONCE
Status: COMPLETED | OUTPATIENT
Start: 2018-02-19 | End: 2018-02-19

## 2018-02-19 RX ORDER — DIPHENHYDRAMINE HCL 25 MG
50 CAPSULE ORAL ONCE
Status: COMPLETED | OUTPATIENT
Start: 2018-02-19 | End: 2018-02-19

## 2018-02-19 RX ADMIN — SODIUM CHLORIDE 1300 MG: 9 INJECTION, SOLUTION INTRAVENOUS at 15:03

## 2018-02-19 RX ADMIN — SODIUM CHLORIDE, PRESERVATIVE FREE 500 UNITS: 5 INJECTION INTRAVENOUS at 17:24

## 2018-02-19 RX ADMIN — SODIUM CHLORIDE 250 ML: 9 INJECTION, SOLUTION INTRAVENOUS at 14:15

## 2018-02-19 RX ADMIN — DIPHENHYDRAMINE HYDROCHLORIDE 50 MG: 25 CAPSULE ORAL at 14:19

## 2018-02-19 RX ADMIN — PEGFILGRASTIM 6 MG: KIT SUBCUTANEOUS at 17:26

## 2018-02-19 RX ADMIN — PACLITAXEL 240 MG: 6 INJECTION, SOLUTION INTRAVENOUS at 16:12

## 2018-02-19 RX ADMIN — FAMOTIDINE 20 MG: 20 INJECTION, SOLUTION INTRAVENOUS at 14:20

## 2018-02-19 RX ADMIN — SODIUM CHLORIDE, PRESERVATIVE FREE 5 ML: 5 INJECTION INTRAVENOUS at 11:47

## 2018-02-19 RX ADMIN — DEXAMETHASONE SODIUM PHOSPHATE 20 MG: 10 INJECTION, SOLUTION INTRAMUSCULAR; INTRAVENOUS at 14:34

## 2018-02-19 ASSESSMENT — PAIN SCALES - GENERAL
PAINLEVEL: NO PAIN (0)
PAINLEVEL: NO PAIN (0)

## 2018-02-19 NOTE — PATIENT INSTRUCTIONS
Contact Numbers    [facility] [place] Line: [phone]  [facility] Triage:  [phone]    Call triage with chills and/or temperature greater than or equal to 100.5, uncontrolled nausea/vomiting, diarrhea, constipation, dizziness, shortness of breath, chest pain, bleeding, unexplained bruising, or any new/concerning symptoms, questions/concerns.     If you are having any concerning symptoms or wish to speak to a provider before your next infusion visit, please call your care coordinator or triage to notify them so we can adequately serve you.       After Hours: [phone]    If after hours, weekends, or holidays, call main hospital  and ask for Oncology doctor on call.         February 2018 Sunday Monday Tuesday Wednesday Thursday Friday Saturday                       1     2     3       4     5     [facility]    9:30 AM   (15 min.)   [facility] DRAW   [facility] Lab Draw     UMP RETURN    9:55 AM   (50 min.)   Loraine Sutherland PA-C [facility]     [facility] ONC INFUSION 180   11:00 AM   (180 min.)    ONCOLOGY INFUSION   [facility] 6     [facility] RETURN WITH ROOM    7:45 AM   (60 min.)   Dao Lacy PsyD   [facility] 7     8     9     [facility] DRAW   10:15 AM   (15 min.)   [facility]     [facility] RETURN   10:25 AM   (50 min.)   Amparo Sow PA   [facility]     CT ABDOMEN PELVIS W    4:45 PM   (20 min.)   UCCT1   [facility] CT 10       11     12     [facility] DRAW    8:00 AM   (15 min.)   [facility] DRAW   [facility] Lab Draw     [facility] ONC INFUSION 120    8:30 AM   (120 min.)   UC ONCOLOGY INFUSION   [facility]     UMP RETURN    9:45 AM   (50 min.)   Loraine Sutherland PA-C M [facility] 13     14     15     16     17       18     19     [facility] DRAW   11:15 AM   (15 min.)   [facility] DRAW     [facility] Lab Draw     UMP RETURN   11:35 AM   (50 min.)   Loraine Sutherland PA-C   [facility]     [facility] ONC INFUSION 180   12:30 PM   (180 min.)    ONCOLOGY INFUSION   [facility] 20     21     22     23     24       25     26     27     28                               March 2018 Sunday Monday Tuesday Wednesday Thursday Friday Saturday                       1     2     3       4     5     6     [facility] LAB DRAW    9:30 AM   (15 min.)   [facility] DRAW   [facility] Lab Draw     CT CHEST/ABDOMEN/PELVIS W    9:45 AM   (20 min.)   UCCT2   [facility] CT     [facility] RETURN   12:15 PM   (30 min.)   Kadi Dee MD   [facility] 7     8     9     10       11     12     13     14     15     16     17       18     19     20     21     22     23     24       25     26     27     28     29     30     31                 Recent Results (from the past 24 hour(s))   CBC with platelets differential    Collection Time: 02/19/18 11:50 AM   Result Value Ref Range    WBC 2.5 (L) 4.0 - 11.0 10e9/L    RBC Count 4.28 (L) 4.4 - 5.9 10e12/L    Hemoglobin 12.9 (L) 13.3 - 17.7 g/dL    Hematocrit 39.9 (L) 40.0 - 53.0 %    MCV 93 78 - 100 fl    MCH 30.1 26.5 - 33.0 pg    MCHC 32.3 31.5 - 36.5 g/dL    RDW 13.7 10.0 - 15.0 %    Platelet Count 131 (L) 150 - 450 10e9/L    Diff Method Automated Method     % Neutrophils 54.5 %    % Lymphocytes 39.0 %    % Monocytes 4.1 %    % Eosinophils 1.6 %    % Basophils 0.4 %    % Immature Granulocytes 0.4 %    Nucleated RBCs 0 0 /100    Absolute Neutrophil 1.3 (L) 1.6 - 8.3 10e9/L    Absolute Lymphocytes 1.0 0.8 - 5.3 10e9/L    Absolute Monocytes 0.1 0.0 - 1.3 10e9/L    Absolute Eosinophils 0.0 0.0 - 0.7 10e9/L    Absolute Basophils 0.0 0.0 - 0.2 10e9/L    Abs Immature Granulocytes 0.0 0 - 0.4 10e9/L    Absolute Nucleated RBC 0.0    Protein qualitative urine    Collection Time: 02/19/18 11:58 AM   Result Value Ref  Range    Protein Albumin Urine Trace (A) NEG^Negative mg/dL

## 2018-02-19 NOTE — NURSING NOTE
"Oncology Rooming Note    February 19, 2018 12:28 PM   Reuben Padilla is a 57 year old male who presents for:    Chief Complaint   Patient presents with     Port Draw     Labs drawn from port by RN. Line flushed with saline and heparin. Vs taken and pt checked in for appt. Urine collected.     Oncology Clinic Visit     Return for Esophageal Ca      Initial Vitals: BP (!) 137/91 (BP Location: Right arm, Cuff Size: Adult Regular)  Pulse 95  Temp 97.5  F (36.4  C) (Oral)  Resp 16  Wt (!) 161.1 kg (355 lb 3.2 oz)  SpO2 96%  BMI 41.06 kg/m2 Estimated body mass index is 41.06 kg/(m^2) as calculated from the following:    Height as of 2/12/18: 1.981 m (6' 5.99\").    Weight as of this encounter: 161.1 kg (355 lb 3.2 oz). Body surface area is 2.98 meters squared.  No Pain (0) Comment: Data Unavailable   No LMP for male patient.  Allergies reviewed: Yes  Medications reviewed: Yes    Medications: Medication refills not needed today.  Pharmacy name entered into [employer]:    [id number] - [place], [place] - [address] [place] [address] AT INTERSECTION [address] [place], [place] - [address] [place] [address] [phone]  [id number] - [place], [place] - [address] [place] [address]    Clinical concerns: Results  Ena was notified.    5 minutes for nursing intake (face to face time)     Jess Loredo MA              "

## 2018-02-19 NOTE — MR AVS SNAPSHOT
After Visit Summary   2/19/2018    Reuben Padilla    MRN: [id number]           Patient Information     Date Of Birth          1960        Visit Information        Provider Department      2/19/2018 11:50 AM Loraine Sutherland PA-C [facility]        Today's Diagnoses     Cancer of distal third of esophagus (H)    -  1       Follow-ups after your visit        Your next 10 appointments already scheduled     Mar 06, 2018  9:30 AM CST   [facility] Lab Draw with  [facility] LAB DRAW   [facility]    [address]  [address]  [facility] [id number] [phone]            Mar 06, 2018 10:00 AM CST   (Arrive by 9:45 AM)   CT CHEST/ABDOMEN/PELVIS W CONTRAST with UCCT2   [facility] [place] [facility] ([facility])    [address]  [address]  [facility] [id number] [phone]           Please bring any scans or X-rays taken at other hospitals, if similar tests were done. Also bring a list of your medicines, including vitamins, minerals and over-the-counter drugs. It is safest to leave personal items at home.  Be sure to tell your doctor:   If you have any allergies.   If there s any chance you are pregnant.   If you are breastfeeding.  You may have contrast for this exam. To prepare:   Do not eat or drink for 2 hours before your exam. If you need to take medicine, you may take it with small sips of water. (We may ask you to take liquid medicine as well.)   The day before your exam, drink extra fluids at least six 8-ounce glasses (unless your doctor tells you to restrict your fluids).   You will be given instructions on how to drink the contrast.  Patients over 70 or patients with diabetes or kidney problems:   If you haven t had a blood test (creatinine test) within the last 30 days, the Cardiologist/Radiologist may require you to get this test prior to your exam.  If you have diabetes:    Continue to take your metformin medication on the day of your exam  Please wear loose clothing, such as a sweat suit or jogging clothes. Avoid snaps, zippers and other metal. We may ask you to undress and put on a hospital gown.  If you have any questions, please call the Imaging Department where you will have your exam.            Mar 06, 2018 12:30 PM CST   (Arrive by 12:15 PM)   Return Visit with Kadi Dee MD   [facility])    [address]  [address]  [facility] [address] [phone]              Who to contact     If you have questions or need follow up information about today's clinic visit or your schedule please contact [facility] directly at [phone].  Normal or non-critical lab and imaging results will be communicated to you by MyChart, letter or phone within 4 business days after the clinic has received the results. If you do not hear from us within 7 days, please contact the clinic through [employer] or phone. If you have a critical or abnormal lab result, we will notify you by phone as soon as possible.  Submit refill requests through [employer] or call your pharmacy and they will forward the refill request to us. Please allow 3 business days for your refill to be completed.          Additional Information About Your Visit        [employer] Information     [employer] gives you secure access to your electronic health record. If you see a primary care provider, you can also send messages to your care team and make appointments. If you have questions, please call your primary care clinic.  If you do not have a primary care provider, please call [phone] and they will assist you.        Care EveryWhere ID     This is your Care EveryWhere ID. This could be used by other organizations to access your [place] medical records  [id number]        Your Vitals Were     Pulse Temperature Respirations Pulse Oximetry BMI  (Body Mass Index)       95 97.5  F (36.4  C) (Oral) 16 96% 41.06 kg/m2        Blood Pressure from Last 3 Encounters:   02/19/18 (!) 137/91   02/19/18 (!) 137/91   02/12/18 122/86    Weight from Last 3 Encounters:   02/19/18 (!) 161.1 kg (355 lb 3.2 oz)   02/19/18 (!) 161.1 kg (355 lb 3.2 oz)   02/12/18 (!) 162.8 kg (358 lb 14.4 oz)              Today, you had the following     No orders found for display       Primary Care Provider Fax #    Physician No Ref-Primary [phone]       No address on file        Equal Access to Services     OLLIE SHARPE : Eliza Dunlap, waomer davis, nancy kaalmajuancarlos sotelo, geraldo washington . So [facility] [phone].    ATENCIÓN: Si habla español, tiene a alamo disposición servicios gratuitos de asistencia lingüística. Llame al [phone].    We comply with applicable federal civil rights laws and [place] laws. We do not discriminate on the basis of race, color, national origin, age, disability, sex, sexual orientation, or gender identity.            Thank you!     Thank you for choosing [facility]  for your care. Our goal is always to provide you with excellent care. Hearing back from our patients is one way we can continue to improve our services. Please take a few minutes to complete the written survey that you may receive in the mail after your visit with us. Thank you!             Your Updated Medication List - Protect others around you: Learn how to safely use, store and throw away your medicines at www.disposemymeds.org.          This list is accurate as of 2/19/18 11:59 PM.  Always use your most recent med list.                   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)       fish oil-omega-3 fatty acids 1000 MG capsule      Take 2 g by mouth daily         IBUPROFEN PO      Take 600 mg by mouth every 4 hours as needed for moderate pain        lidocaine-prilocaine cream    EMLA    30 g    Apply topically as needed for moderate pain    Cancer of distal third of esophagus (H)       LORazepam 0.5 MG tablet    ATIVAN    30 tablet    Take 1 tablet (0.5 mg) by mouth every 4 hours as needed (Anxiety, Nausea/Vomiting or Sleep)    Cancer of distal third of esophagus (H)       Multi-vitamin Tabs tablet      Take 1 tablet by mouth daily        ondansetron 8 MG tablet    ZOFRAN    30 tablet    Take 1 tablet (8 mg) by mouth every 8 hours as needed (Nausea/Vomiting)    Cancer of distal third of esophagus (H)       prochlorperazine 10 MG tablet    COMPAZINE    30 tablet    Take 1 tablet (10 mg) by mouth every 6 hours as needed (Nausea/Vomiting)    Cancer of distal third of esophagus (H)       timolol 0.5 % ophthalmic solution    TIMOPTIC     Place into both eyes daily    Cancer of distal third of esophagus (H)       zolpidem 10 MG tablet    AMBIEN    60 tablet    Take 1 tablet (10 mg) by mouth nightly as needed    Metastasis from gastric cancer (H)

## 2018-02-19 NOTE — MR AVS SNAPSHOT
After Visit Summary   2/19/2018    Reuben Padilla    MRN: [id number]           Patient Information     Date Of Birth          1960        Visit Information        Provider Department      2/19/2018 12:30 PM  30 ATC;  ONCOLOGY INFUSION [facility]        Today's Diagnoses     Cancer of distal third of esophagus (H)    -  1      Care Instructions    Contact Numbers    [facility] [place] [phone]  [facility] [place]:  [phone]    Call triage with chills and/or temperature greater than or equal to 100.5, uncontrolled nausea/vomiting, diarrhea, constipation, dizziness, shortness of breath, chest pain, bleeding, unexplained bruising, or any new/concerning symptoms, questions/concerns.     If you are having any concerning symptoms or wish to speak to a provider before your next infusion visit, please call your care coordinator or triage to notify them so we can adequately serve you.       After Hours: [phone]    If after hours, weekends, or holidays, call main hospital  and ask for Oncology doctor on call.         February 2018 Sunday Monday Tuesday Wednesday Thursday Friday Saturday                       1     2     3       4     5     [facility] LAB DRAW    9:30 AM   (15 min.)    [facility]     [facility] RETURN    9:55 AM   (50 min.)   Loraine Sutherland PA-C   [facility]     [facility] ONC INFUSION 180   11:00 AM   (180 min.)    ONCOLOGY INFUSION   [facility] 6     [facility] RETURN WITH ROOM    7:45 AM   (60 min.)   Dao Lacy PsyD   [facility] 7     8     9     P [facility] LAB DRAW   10:15 AM   (15 min.)    [facility]     [facility] RETURN   10:25 AM   (50 min.)   Amparo Sow PA   [facility]     CT ABDOMEN PELVIS W    4:45 PM   (20 min.)   UCCT1   [facility] CT 10       11     12     [facility] LAB DRAW     8:00 AM   (15 min.)   [facility]     [facility] ONC INFUSION 120    8:30 AM   (120 min.)   [facility] ONCOLOGY INFUSION   [facility]     [facility] RETURN    9:45 AM   (50 min.)   Loraine Sutherland PA-C   [facility] 13     14     15     16     17       18     19     [facility] LAB DRAW   11:15 AM   (15 min.)   [facility] LAB DRAW   [facility] Lab Draw     [facility] RETURN   11:35 AM   (50 min.)   Loraine Sutherland PA-C   [facility]     [facility] ONC INFUSION 180   12:30 PM   (180 min.)   [facility] ONCOLOGY INFUSION   [facility] 20     21     22     23     24       25     26     27     28                               March 2018 Sunday Monday Tuesday Wednesday Thursday Friday Saturday                       1     2     3       4     5     6     [facility] LAB DRAW    9:30 AM   (15 min.)   [facility] LAB DRAW   [facility] Lab Draw     CT CHEST/ABDOMEN/PELVIS W    9:45 AM   (20 min.)   UCCT2   [facility] CT     [facility] RETURN   12:15 PM   (30 min.)   Kadi Dee MD   [facility] 7     8     9     10       11     12     13     14     15     16     17       18     19     20     21     22     23     24       25     26     27     28     29     30     31                 Recent Results (from the past 24 hour(s))   CBC with platelets differential    Collection Time: 02/19/18 11:50 AM   Result Value Ref Range    WBC 2.5 (L) 4.0 - 11.0 10e9/L    RBC Count 4.28 (L) 4.4 - 5.9 10e12/L    Hemoglobin 12.9 (L) 13.3 - 17.7 g/dL    Hematocrit 39.9 (L) 40.0 - 53.0 %    MCV 93 78 - 100 fl    MCH 30.1 26.5 - 33.0 pg    MCHC 32.3 31.5 - 36.5 g/dL    RDW 13.7 10.0 - 15.0 %    Platelet Count 131 (L) 150 - 450 10e9/L    Diff Method Automated Method     % Neutrophils 54.5 %    % Lymphocytes 39.0 %    % Monocytes 4.1 %    % Eosinophils 1.6 %    % Basophils 0.4 %    % Immature Granulocytes 0.4 %    Nucleated RBCs 0 0  /100    Absolute Neutrophil 1.3 (L) 1.6 - 8.3 10e9/L    Absolute Lymphocytes 1.0 0.8 - 5.3 10e9/L    Absolute Monocytes 0.1 0.0 - 1.3 10e9/L    Absolute Eosinophils 0.0 0.0 - 0.7 10e9/L    Absolute Basophils 0.0 0.0 - 0.2 10e9/L    Abs Immature Granulocytes 0.0 0 - 0.4 10e9/L    Absolute Nucleated RBC 0.0    Protein qualitative urine    Collection Time: 02/19/18 11:58 AM   Result Value Ref Range    Protein Albumin Urine Trace (A) NEG^Negative mg/dL                 Follow-ups after your visit        Your next 10 appointments already scheduled     Mar 06, 2018  9:30 AM CST   [facility] Lab Draw with  MASONIC LAB DRAW   [facility] Lab Draw ([facility])    [address]  [address]  [place] [address] [phone]            Mar 06, 2018 10:00 AM CST   (Arrive by 9:45 AM)   CT CHEST/ABDOMEN/PELVIS W CONTRAST with UCCT2   [facility] CT ([facility])    [address]  1st Floor  [place] [address] [phone]           Please bring any scans or X-rays taken at other hospitals, if similar tests were done. Also bring a list of your medicines, including vitamins, minerals and over-the-counter drugs. It is safest to leave personal items at home.  Be sure to tell your doctor:   If you have any allergies.   If there s any chance you are pregnant.   If you are breastfeeding.  You may have contrast for this exam. To prepare:   Do not eat or drink for 2 hours before your exam. If you need to take medicine, you may take it with small sips of water. (We may ask you to take liquid medicine as well.)   The day before your exam, drink extra fluids at least six 8-ounce glasses (unless your doctor tells you to restrict your fluids).   You will be given instructions on how to drink the contrast.  Patients over 70 or patients with diabetes or kidney problems:   If you haven t had a blood test (creatinine test) within the last 30 days, the  Cardiologist/Radiologist may require you to get this test prior to your exam.  If you have diabetes:   Continue to take your metformin medication on the day of your exam  Please wear loose clothing, such as a sweat suit or jogging clothes. Avoid snaps, zippers and other metal. We may ask you to undress and put on a hospital gown.  If you have any questions, please call the Imaging Department where you will have your exam.            Mar 06, 2018 12:30 PM CST   (Arrive by 12:15 PM)   Return Visit with Kadi Dee MD   [facility] ([facility])    [address]  [address]  [place] [address] [phone]              Who to contact     If you have questions or need follow up information about today's clinic visit or your schedule please contact [facility] directly at [phone].  Normal or non-critical lab and imaging results will be communicated to you by MyChart, letter or phone within 4 business days after the clinic has received the results. If you do not hear from us within 7 days, please contact the clinic through [email or web address] or phone. If you have a critical or abnormal lab result, we will notify you by phone as soon as possible.  Submit refill requests through DigePrint or call your pharmacy and they will forward the refill request to us. Please allow 3 business days for your refill to be completed.          Additional Information About Your Visit        DigePrint Information     DigePrint gives you secure access to your electronic health record. If you see a primary care provider, you can also send messages to your care team and make appointments. If you have questions, please call your primary care clinic.  If you do not have a primary care provider, please call [phone] and they will assist you.        Care EveryWhere ID     This is your Care EveryWhere ID. This could be used by other organizations to access your [address]  medical records  [id number]        Your Vitals Were     Pulse Temperature Respirations Pulse Oximetry BMI (Body Mass Index)       95 97.5  F (36.4  C) (Oral) 16 96% 41.06 kg/m2        Blood Pressure from Last 3 Encounters:   02/19/18 (!) 137/91   02/19/18 (!) 137/91   02/12/18 122/86    Weight from Last 3 Encounters:   02/19/18 (!) 161.1 kg (355 lb 3.2 oz)   02/19/18 (!) 161.1 kg (355 lb 3.2 oz)   02/12/18 (!) 162.8 kg (358 lb 14.4 oz)              We Performed the Following     CBC with platelets differential     Protein qualitative urine        Primary Care Provider Fax #    Physician No Ref-Primary [phone]       No address on file        Equal Access to Services     [facility] : Eliza Dunlap, waomer davis, nancy kaalmajuancarlos sotelo, geraldo [place] . So [facility] [phone].    ATENCIÓN: Si habla español, tiene a alamo disposición servicios gratuitos de asistencia lingüística. Llame al [phone].    We comply with applicable federal civil rights laws and [place] laws. We do not discriminate on the basis of race, color, national origin, age, disability, sex, sexual orientation, or gender identity.            Thank you!     Thank you for choosing [facility]  for your care. Our goal is always to provide you with excellent care. Hearing back from our patients is one way we can continue to improve our services. Please take a few minutes to complete the written survey that you may receive in the mail after your visit with us. Thank you!             Your Updated Medication List - Protect others around you: Learn how to safely use, store and throw away your medicines at www.disposemymeds.org.          This list is accurate as of 2/19/18  4:37 PM.  Always use your most recent med list.                   Brand Name Dispense Instructions for use Diagnosis    enoxaparin 80 MG/0.8ML injection    LOVENOX    60 Syringe    Inject 1.3 mLs (130 mg)  Subcutaneous 2 times daily    Cancer of distal third of esophagus (H)       fish oil-omega-3 fatty acids 1000 MG capsule      Take 2 g by mouth daily        IBUPROFEN PO      Take 600 mg by mouth every 4 hours as needed for moderate pain        lidocaine-prilocaine cream    EMLA    30 g    Apply topically as needed for moderate pain    Cancer of distal third of esophagus (H)       LORazepam 0.5 MG tablet    ATIVAN    30 tablet    Take 1 tablet (0.5 mg) by mouth every 4 hours as needed (Anxiety, Nausea/Vomiting or Sleep)    Cancer of distal third of esophagus (H)       Multi-vitamin Tabs tablet      Take 1 tablet by mouth daily        ondansetron 8 MG tablet    ZOFRAN    30 tablet    Take 1 tablet (8 mg) by mouth every 8 hours as needed (Nausea/Vomiting)    Cancer of distal third of esophagus (H)       prochlorperazine 10 MG tablet    COMPAZINE    30 tablet    Take 1 tablet (10 mg) by mouth every 6 hours as needed (Nausea/Vomiting)    Cancer of distal third of esophagus (H)       timolol 0.5 % ophthalmic solution    TIMOPTIC     Place into both eyes daily    Cancer of distal third of esophagus (H)       zolpidem 10 MG tablet    AMBIEN    60 tablet    Take 1 tablet (10 mg) by mouth nightly as needed    Metastasis from gastric cancer (H)

## 2018-02-19 NOTE — NURSING NOTE
"Chief Complaint   Patient presents with     Port Draw     Labs drawn from port by RN. Line flushed with saline and heparin. Vs taken and pt checked in for appt. Urine collected.     Port accessed with 20g 3/4\" flat needle by RN, labs collected, line flushed with saline and heparin.  Vitals taken. Pt checked in for appointment(s).    Carley Phipps RN  "

## 2018-02-19 NOTE — PROGRESS NOTES
Oncology/Hematology Visit Note  Feb 19, 2018    Reason for Visit: Follow up metastatic esophageal cancer. 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 His tumor is positive for cytokeratin 20, negative for P63 and CK7, and HER2 is negative. He started on FOLFOX (5FU/oxaliplatin) on 5/15/2017. He had a delay in treatment from 9/5-10/2/17 due to work related injury. He had an excellent response by imaging throughout the summer 2017. He had progression of disease by PET/CT in 12/2017.  He underwent biopsy for Foundation One testing which is currently pending.  Plan was changed to Taxol and Cymramza, although Cyramza on hold for 2 weeks due to recent PE. Please see previous notes for further details on the patient's history. He was in lab today prior to infusion appointment when he had syncopal event.    Reuben was seen on 1/8/18 as an add-on due to syncopal event.  Most likely vasovagal reaction since he had had previous syncopal episodes with blood draws. Please see note for full details.  His chemotherapy was continued.  He was also seen as an add-on for cellulitis on 2/9/18 and prescribed Bactrim.  He is status post Taxol and Cyramza cycle 2, day 8 and here for cycle 2 day 15 today.    Interval History: Reuben is doing okay today.  He admits to bilateral leg twitching for the last 2 days he finds it more annoying and not necessarily painful.  He actually took an old oxycodone from a previous prescription and this helped his symptoms.  2 nights ago he took 20 mg and last night 10 mg and was able to rest all night.  He denies any change in exercise or change position that may affect the symptoms.  Denies any recent trauma.  He states his been drinking plenty electrolytes stay hydrated.  His umbilical cellulitis has  "completely resolved and no longer taking Bactrim.  He continues to do Lovenox and admits to \"small bumps\" in his abdomen.  He has a history of peripheral neuropathy and not worsening.  He denies any vomiting or diarrhea he does admit to occasional constipation, however denies that today.  Denies abdominal pain, bleeding, vomiting, urinary symptoms, new rashes, fever, or sore throat.    Review of Systems: See interval hx. Denies fevers, chills, HA, dizziness, n/t, changes in vision, cough, sore throat, CP, SOB, abdominal pain, N/V, diarrhea, changes in urination, bleeding, bruising, rash.     Current Outpatient Prescriptions   Medication Sig Dispense Refill     zolpidem (AMBIEN) 10 MG tablet Take 1 tablet (10 mg) by mouth nightly as needed 60 tablet 1     enoxaparin (LOVENOX) 80 MG/0.8ML injection Inject 1.3 mLs (130 mg) Subcutaneous 2 times daily 60 Syringe 3     IBUPROFEN PO Take 600 mg by mouth every 4 hours as needed for moderate pain       fish oil-omega-3 fatty acids 1000 MG capsule Take 2 g by mouth daily       LORazepam (ATIVAN) 0.5 MG tablet Take 1 tablet (0.5 mg) by mouth every 4 hours as needed (Anxiety, Nausea/Vomiting or Sleep) (Patient not taking: Reported on 1/15/2018) 30 tablet 2     prochlorperazine (COMPAZINE) 10 MG tablet Take 1 tablet (10 mg) by mouth every 6 hours as needed (Nausea/Vomiting) (Patient not taking: Reported on 1/22/2018) 30 tablet 2     [DISCONTINUED] dexamethasone (DECADRON) 4 MG tablet Take two tablets daily on days 2,3 and then 1 tablet daily days 4,5 6 tablet 1     ondansetron (ZOFRAN) 8 MG tablet Take 1 tablet (8 mg) by mouth every 8 hours as needed (Nausea/Vomiting) (Patient not taking: Reported on 2/5/2018) 30 tablet 2     lidocaine-prilocaine (EMLA) cream Apply topically as needed for moderate pain 30 g 3     multivitamin, therapeutic with minerals (MULTI-VITAMIN) TABS tablet Take 1 tablet by mouth daily       timolol (TIMOPTIC) 0.5 % ophthalmic solution Place into both eyes " daily       Physical Examination:  General: The patient is a pleasant male, in no acute distress.   BP (!) 137/91 (BP Location: Right arm, Cuff Size: Adult Regular)  Pulse 95  Temp 97.5  F (36.4  C) (Oral)  Resp 16  Wt (!) 161.1 kg (355 lb 3.2 oz)  SpO2 96%  BMI 41.06 kg/m2    Wt Readings from Last 10 Encounters:   02/19/18 (!) 161.1 kg (355 lb 3.2 oz)   02/19/18 (!) 161.1 kg (355 lb 3.2 oz)   02/12/18 (!) 162.8 kg (358 lb 14.4 oz)   02/09/18 (!) 164.6 kg (362 lb 12.8 oz)   02/05/18 (!) 164.8 kg (363 lb 4.8 oz)   01/22/18 (!) 161.3 kg (355 lb 9.6 oz)   01/08/18 (!) 163.3 kg (360 lb)   12/27/17 (!) 158.8 kg (350 lb)   12/12/17 (!) 159.2 kg (351 lb)   12/11/17 (!) 159.9 kg (352 lb 9.6 oz)     HEENT: EOMI, PERRL. Sclerae are anicteric.   Heart: Irregular rate and rhythm.   Lungs: Clear to auscultation bilaterally.   Abdomen: Obese, soft. Mixture of older and newer ecchymotic areas on right and left side of lower abdomen. No erythema or swelling in or around umbilicus.   Extremities: Hyperpigmentation of right leg. Right leg a little more swollen than left but patient states this is chronic and unchanged.  Neuro: grossly non-focal  Skin: No other rashes, ecchymosis or petechiae except what is noted above.    Laboratory Data:   2/19/2018 11:50 2/19/2018 11:58   WBC 2.5 (L)    Hemoglobin 12.9 (L)    Hematocrit 39.9 (L)    Platelet Count 131 (L)    RBC Count 4.28 (L)    MCV 93    MCH 30.1    MCHC 32.3    RDW 13.7    Diff Method Automated Method    % Neutrophils 54.5    % Lymphocytes 39.0    % Monocytes 4.1    % Eosinophils 1.6    % Basophils 0.4    % Immature Granulocytes 0.4    Nucleated RBCs 0    Absolute Neutrophil 1.3 (L)    Absolute Lymphocytes 1.0    Absolute Monocytes 0.1    Absolute Eosinophils 0.0    Absolute Basophils 0.0    Abs Immature Granulocytes 0.0    Absolute Nucleated RBC 0.0    Protein Albumin Urine  Trace (A)       Imaging:  EXAMINATION: CT ABDOMEN PELVIS W CONTRAST, 2/9/2018 12:13  PM    TECHNIQUE:  Helical CT images from the lung bases through the  symphysis pubis were obtained with contrast.  Coronal reformatted  images were generated at a workstation for further assessment.     CONTRAST:  135 cc Isovue 370 IV.     COMPARISON: CT 10/30/2017     HISTORY: 58 yo male with metastic esophageal cancer. Erythema,  tenderness around umbilicus and reported pus, bloody drainage.;  Cellulitis and abscess of trunk; Cellulitis and abscess of trunk     FINDINGS:     Abdomen and pelvis:   Liver: No suspicious liver lesions. Portal veins appear patent.  Gallbladder: No gallstones. No evidence of acute cholecystitis.  Spleen: Splenomegaly measuring up to 19.2 cm..  Pancreas: No suspicious pancreatic lesions. The pancreatic duct is not  dilated.  Adrenal glands: Unchanged fat-containing heterogeneously enhancing  adrenal masses, 3.5 x 4.3 cm on the left and 5.0 x 4.0 cm on the  right, consistent with benign myelolipomas.  Kidneys: No kidney masses. Unchanged left-sided simple cysts. No  hydronephrosis or obstructing renal stones.  Bladder / Pelvic organs: Unremarkable.  Bowel/peritoneum: No change in the concentric thickening of the distal  esophagus/GE junction. Soft tissue mass adjacent to the diaphragmatic  crura measuring 3.6 x 2.8 cm, previously 3.5 x 2.6 cm. Unchanged toy  mesentery in the upper abdomen. No bowel wall thickening. The appendix  is unremarkable.  Lymph nodes: No retroperitoneal, mesenteric, or pelvic lymphadenopathy  by size criteria.  Fluid: Small amount of free fluid in the pelvis.  Vessels: No infrarenal aortic aneurysm.      Lung bases: No consolidation or pleural effusion. Unchanged 3 mm  subpleural pulmonary nodule in the right lower lobe (series 3 image  25).     Bones and soft tissues: No suspicious osseous lesions. Unchanged  sclerotic focus in the left iliac bone. No change in the was  comparison deformities of T9, T10, T12, and L2. There is new  thickening and fat stranding  about the umbilicus with no fluid  collection or fistula visualized. Soft tissue nodules in the anterior  abdomen, likely injection sites.         IMPRESSION:   In this patient with history of esophageal carcinoma:  1. There is stable concentric esophageal wall thickening of the distal  third of the esophagus/GE junction.  2. No change in the size of the soft tissue mass adjacent to the  diaphragmatic pleura encasing the splenic artery and abutting the  celiac trunk.  3. Stable mesenteric haziness and prominent mesenteric lymph nodes,  likely posttreatment changes.  4. New fat stranding and thickening of the umbilicus. No drainable  fluid.      Assessment and Plan:    Metastatic esophogeal cancer.  He had progression of disease by PET/CT in 12/2017.  He underwent biopsy for Foundation One testing. Plan was changed to Taxol/Cyramza and Cyramza was started on C1D15 due to recent PE.  He has tolerated Taxol fairly well and first dose of Cyramza given on 1/22/18.  Cycle 2, ANC was 0.9 and Taxol was held, however Cyramza was given.  WBC 2.5 today and ANC 1.3.  Will proceed with cycle 2, day 15.   --Adding in Neulasta support due to low counts  --3/6 CTcap, Dr Dee    Muscle spasms: Possibly from Taxol and recommended Gatorade. Improved with oxycodone at home.  Discussed using lorazepam and possible gabapentin, however will hold off on new prescriptions at this time.  Symptoms improved today and will continue to monitor.  Electrolytes on 2/9/18 unremarkable.  If symptoms persist, would consider repeating electrolytes and trying Rx gabapentin.     Cellulitis, periumbilical: Finished course of Bactrim and resolved.     Syncope: Occurred during blood draw a few weeks ago. Workup unremarkable and proceeded with chemotherapy.  No symptoms during blood draw 2 weeks ago or today and has been asymptomatic since this episode.  Vital signs stable today.  Will continue to monitor.     Diarrhea: Likely from Taxol. Improved. On  imodium prn.    Atrial fibrillation: Evaluated by cardiology on 8/21/17.  Heart rate in clinic today 95 and asymptomatic.  Currently on Lovenox.  --Will refer to cardiology if becomes symptomatic     H/o PE: This was originally identified on staging in 07/2017.  He was on Xarelto.  At his restaging evaluation he had progressive symptoms with more clot burden and was started on Lovenox 1 mg/kg twice daily. Hep 10a was 1.02 on 12/26/17 and 1.28 on 124/18.  Lovenox was reduced to 80 mg twice a day and repeat heparin 10 A level 0.81 on 1/29/18.  We will continue this dosing.  Minimal bruising on lower abdomen.  Will closely monitor.    Peripheral neuropathy: Chronic since FOLFOX therapy and slightly increased neuropathy on the fingertips of all 5 fingers.  Grade 1 at this time. Continue to monitor.    Proteinuria: Trace today and slowly improving.    Foundation One: Results have returned and printed out a copy for the patient.  Briefly discussed process and will wait for follow-up with Dr. Dee to go over final plan.      Chelo Rogers PA-C

## 2018-02-20 ASSESSMENT — ENCOUNTER SYMPTOMS
INCREASED ENERGY: 1
ALTERED TEMPERATURE REGULATION: 1
BLOATING: 0
FATIGUE: 1
ABDOMINAL PAIN: 0
SPEECH CHANGE: 0
TREMORS: 0
TINGLING: 0
BACK PAIN: 0
VOMITING: 0
LOSS OF CONSCIOUSNESS: 0
ORTHOPNEA: 0
SYNCOPE: 0
HEARTBURN: 0
MUSCLE CRAMPS: 0
JAUNDICE: 0
CHILLS: 1
EXERCISE INTOLERANCE: 0
POLYPHAGIA: 0
BOWEL INCONTINENCE: 0
CONSTIPATION: 1
SWOLLEN GLANDS: 0
WEIGHT GAIN: 0
LIGHT-HEADEDNESS: 0
BLOOD IN STOOL: 0
HALLUCINATIONS: 0
MUSCLE WEAKNESS: 0
WEAKNESS: 0
BRUISES/BLEEDS EASILY: 1
PARALYSIS: 0
DIARRHEA: 1
NAUSEA: 0
LEG PAIN: 0
NUMBNESS: 1
NIGHT SWEATS: 0
SLEEP DISTURBANCES DUE TO BREATHING: 0
HYPERTENSION: 0
DISTURBANCES IN COORDINATION: 1
DECREASED APPETITE: 0
HEADACHES: 0
MEMORY LOSS: 0
HYPOTENSION: 0
SEIZURES: 0
JOINT SWELLING: 0
NECK PAIN: 0
PALPITATIONS: 0
POLYDIPSIA: 0
FEVER: 0
RECTAL PAIN: 0
STIFFNESS: 0
MYALGIAS: 1
ARTHRALGIAS: 0
WEIGHT LOSS: 0
DIZZINESS: 1

## 2018-02-20 NOTE — PROGRESS NOTES
Infusion Nursing Note:  Reuben Padilla presents today for Cycle 2 Day 15 Cyramza, Taxol.    Patient seen by provider today: Yes: SERGIO Barker   present during visit today: Not Applicable.    Note: Neulasta Onpro On-Body injector applied to right side of abdomen at 1726 with light facing up.  Writer discussed Neulasta injection would start on 2/20/18 at 2030, approximately 27 hours after application applied today.  Written and Verbal instruction reviewed with patient.  Pt instructed when the dose delivery starts, it will take about 45 minutes to complete.  Pt aware Neulasta Onpro On-Body should have green flashing light and to call triage or on-call MD if injector flashes red or appears to be leaking. Pt aware to keep Onpro On-Body Neulasta 4 inches away from electrical equipment and to avoid showering 4 hours prior to injection.     Intravenous Access:  Implanted Port.    Treatment Conditions:  Lab Results   Component Value Date    HGB 12.9 02/19/2018     Lab Results   Component Value Date    WBC 2.5 02/19/2018      Lab Results   Component Value Date    ANEU 1.3 02/19/2018     Lab Results   Component Value Date     02/19/2018      Lab Results   Component Value Date     02/09/2018                   Lab Results   Component Value Date    POTASSIUM 3.8 02/09/2018           Lab Results   Component Value Date    MAG 1.7 01/15/2018            Lab Results   Component Value Date    CR 0.83 02/09/2018                   Lab Results   Component Value Date    NIDHI 8.6 02/09/2018                Lab Results   Component Value Date    BILITOTAL 0.6 02/05/2018           Lab Results   Component Value Date    ALBUMIN 3.3 02/05/2018                    Lab Results   Component Value Date    ALT 20 02/05/2018           Lab Results   Component Value Date    AST 18 02/05/2018   Urine: trace for protein    Results reviewed, labs MET treatment parameters, ok to proceed with treatment.    Post Infusion  Assessment:  Patient tolerated infusion without incident.  Blood return noted pre and post infusion.  Site patent and intact, free from redness, edema or discomfort.  No evidence of extravasations.  Access discontinued per protocol.    Discharge Plan:   Patient declined prescription refills.  Copy of AVS reviewed with patient and/or family. Patient will return 3/6/18 for next appointment.  Patient discharged in stable condition accompanied by: sister.  Departure Mode: Ambulatory.    Chhaya Perdue RN

## 2018-03-05 ENCOUNTER — TELEPHONE (OUTPATIENT)
Dept: ONCOLOGY | Facility: CLINIC | Age: 58
End: 2018-03-05

## 2018-03-05 NOTE — TELEPHONE ENCOUNTER
Patient called to verify if treatment was to begin with lab/scan/consultation appointment Tuesday, March 6. Writer discussed with care coordinator, Josefina Wetzel RN, that treatment plan is pending results of CT scan scheduled for tomorrow and foundation one testing results. Writer called patient back to discuss and patient voiced understanding.

## 2018-03-06 ENCOUNTER — ONCOLOGY VISIT (OUTPATIENT)
Dept: ONCOLOGY | Facility: CLINIC | Age: 58
End: 2018-03-06
Attending: INTERNAL MEDICINE
Payer: COMMERCIAL

## 2018-03-06 ENCOUNTER — RADIANT APPOINTMENT (OUTPATIENT)
Dept: CT IMAGING | Facility: CLINIC | Age: 58
End: 2018-03-06
Attending: INTERNAL MEDICINE
Payer: COMMERCIAL

## 2018-03-06 ENCOUNTER — CARE COORDINATION (OUTPATIENT)
Dept: ONCOLOGY | Facility: CLINIC | Age: 58
End: 2018-03-06

## 2018-03-06 VITALS
RESPIRATION RATE: 16 BRPM | DIASTOLIC BLOOD PRESSURE: 98 MMHG | SYSTOLIC BLOOD PRESSURE: 136 MMHG | HEART RATE: 76 BPM | BODY MASS INDEX: 41.36 KG/M2 | OXYGEN SATURATION: 98 % | WEIGHT: 315 LBS | TEMPERATURE: 97.6 F

## 2018-03-06 DIAGNOSIS — C15.5 CANCER OF DISTAL THIRD OF ESOPHAGUS (H): ICD-10-CM

## 2018-03-06 DIAGNOSIS — C15.5 CANCER OF DISTAL THIRD OF ESOPHAGUS (H): Primary | ICD-10-CM

## 2018-03-06 DIAGNOSIS — C79.9 METASTASIS FROM GASTRIC CANCER (H): ICD-10-CM

## 2018-03-06 DIAGNOSIS — R19.7 DIARRHEA, UNSPECIFIED TYPE: Primary | ICD-10-CM

## 2018-03-06 DIAGNOSIS — C16.9 METASTASIS FROM GASTRIC CANCER (H): ICD-10-CM

## 2018-03-06 LAB
ALBUMIN SERPL-MCNC: 3.4 G/DL (ref 3.4–5)
ALBUMIN UR-MCNC: NEGATIVE MG/DL
ALP SERPL-CCNC: 78 U/L (ref 40–150)
ALT SERPL W P-5'-P-CCNC: 21 U/L (ref 0–70)
ANION GAP SERPL CALCULATED.3IONS-SCNC: 7 MMOL/L (ref 3–14)
AST SERPL W P-5'-P-CCNC: 18 U/L (ref 0–45)
BASOPHILS # BLD AUTO: 0 10E9/L (ref 0–0.2)
BASOPHILS NFR BLD AUTO: 0.5 %
BILIRUB SERPL-MCNC: 0.4 MG/DL (ref 0.2–1.3)
BUN SERPL-MCNC: 11 MG/DL (ref 7–30)
CALCIUM SERPL-MCNC: 8.6 MG/DL (ref 8.5–10.1)
CHLORIDE SERPL-SCNC: 112 MMOL/L (ref 94–109)
CO2 SERPL-SCNC: 24 MMOL/L (ref 20–32)
CREAT SERPL-MCNC: 0.73 MG/DL (ref 0.66–1.25)
DIFFERENTIAL METHOD BLD: ABNORMAL
EOSINOPHIL # BLD AUTO: 0 10E9/L (ref 0–0.7)
EOSINOPHIL NFR BLD AUTO: 0.3 %
ERYTHROCYTE [DISTWIDTH] IN BLOOD BY AUTOMATED COUNT: 15.9 % (ref 10–15)
GFR SERPL CREATININE-BSD FRML MDRD: >90 ML/MIN/1.7M2
GLUCOSE SERPL-MCNC: 94 MG/DL (ref 70–99)
HCT VFR BLD AUTO: 36.3 % (ref 40–53)
HGB BLD-MCNC: 11.7 G/DL (ref 13.3–17.7)
IMM GRANULOCYTES # BLD: 0 10E9/L (ref 0–0.4)
IMM GRANULOCYTES NFR BLD: 0.3 %
LYMPHOCYTES # BLD AUTO: 1 10E9/L (ref 0.8–5.3)
LYMPHOCYTES NFR BLD AUTO: 26.4 %
MCH RBC QN AUTO: 30.8 PG (ref 26.5–33)
MCHC RBC AUTO-ENTMCNC: 32.2 G/DL (ref 31.5–36.5)
MCV RBC AUTO: 96 FL (ref 78–100)
MONOCYTES # BLD AUTO: 0.2 10E9/L (ref 0–1.3)
MONOCYTES NFR BLD AUTO: 4.6 %
NEUTROPHILS # BLD AUTO: 2.7 10E9/L (ref 1.6–8.3)
NEUTROPHILS NFR BLD AUTO: 67.9 %
NRBC # BLD AUTO: 0 10*3/UL
NRBC BLD AUTO-RTO: 0 /100
PLATELET # BLD AUTO: 111 10E9/L (ref 150–450)
POTASSIUM SERPL-SCNC: 3.7 MMOL/L (ref 3.4–5.3)
PROT SERPL-MCNC: 6.8 G/DL (ref 6.8–8.8)
RBC # BLD AUTO: 3.8 10E12/L (ref 4.4–5.9)
SODIUM SERPL-SCNC: 143 MMOL/L (ref 133–144)
WBC # BLD AUTO: 3.9 10E9/L (ref 4–11)

## 2018-03-06 PROCEDURE — 85025 COMPLETE CBC W/AUTO DIFF WBC: CPT | Performed by: INTERNAL MEDICINE

## 2018-03-06 PROCEDURE — 99215 OFFICE O/P EST HI 40 MIN: CPT | Mod: ZP | Performed by: INTERNAL MEDICINE

## 2018-03-06 PROCEDURE — G0463 HOSPITAL OUTPT CLINIC VISIT: HCPCS | Mod: ZF

## 2018-03-06 PROCEDURE — 81003 URINALYSIS AUTO W/O SCOPE: CPT | Performed by: INTERNAL MEDICINE

## 2018-03-06 PROCEDURE — 25000128 H RX IP 250 OP 636: Mod: ZF | Performed by: INTERNAL MEDICINE

## 2018-03-06 PROCEDURE — 36591 DRAW BLOOD OFF VENOUS DEVICE: CPT

## 2018-03-06 PROCEDURE — 80053 COMPREHEN METABOLIC PANEL: CPT | Performed by: INTERNAL MEDICINE

## 2018-03-06 RX ORDER — HEPARIN SODIUM (PORCINE) LOCK FLUSH IV SOLN 100 UNIT/ML 100 UNIT/ML
5 SOLUTION INTRAVENOUS
Status: COMPLETED | OUTPATIENT
Start: 2018-03-06 | End: 2018-03-06

## 2018-03-06 RX ORDER — HEPARIN SODIUM (PORCINE) LOCK FLUSH IV SOLN 100 UNIT/ML 100 UNIT/ML
5 SOLUTION INTRAVENOUS ONCE
Status: COMPLETED | OUTPATIENT
Start: 2018-03-06 | End: 2018-03-06

## 2018-03-06 RX ORDER — ZOLPIDEM TARTRATE 10 MG/1
10 TABLET ORAL
Qty: 60 TABLET | Refills: 1 | Status: SHIPPED | OUTPATIENT
Start: 2018-03-06 | End: 2018-05-16

## 2018-03-06 RX ORDER — IOPAMIDOL 755 MG/ML
135 INJECTION, SOLUTION INTRAVASCULAR ONCE
Status: COMPLETED | OUTPATIENT
Start: 2018-03-06 | End: 2018-03-06

## 2018-03-06 RX ORDER — DIPHENOXYLATE HCL/ATROPINE 2.5-.025MG
1 TABLET ORAL 4 TIMES DAILY PRN
Qty: 40 TABLET | Refills: 1 | Status: SHIPPED | OUTPATIENT
Start: 2018-03-06 | End: 2018-12-17

## 2018-03-06 RX ADMIN — SODIUM CHLORIDE, PRESERVATIVE FREE 5 ML: 5 INJECTION INTRAVENOUS at 09:34

## 2018-03-06 RX ADMIN — HEPARIN SODIUM (PORCINE) LOCK FLUSH IV SOLN 100 UNIT/ML 5 ML: 100 SOLUTION at 10:01

## 2018-03-06 RX ADMIN — IOPAMIDOL 135 ML: 755 INJECTION, SOLUTION INTRAVASCULAR at 09:48

## 2018-03-06 ASSESSMENT — PAIN SCALES - GENERAL: PAINLEVEL: NO PAIN (0)

## 2018-03-06 NOTE — DISCHARGE INSTRUCTIONS

## 2018-03-06 NOTE — NURSING NOTE
"Chief Complaint   Patient presents with     Port Draw     port accessed and labs drawn by rn.  vs taken.     Port accessed with 20g 3/4\" power needle, labs drawn, port flushed with saline and heparin, vitals checked.  Gail De La Torre RN    "

## 2018-03-06 NOTE — NURSING NOTE
"Oncology Rooming Note    March 6, 2018 12:14 PM   Reuben Padilla is a 57 year old male who presents for:    Chief Complaint   Patient presents with     Port Draw     port accessed and labs drawn by rn.  vs taken.     Oncology Clinic Visit     Return: Esophageal CA     Initial Vitals: BP (!) 136/98 (BP Location: Right arm, Patient Position: Sitting, Cuff Size: Adult Large)  Pulse 76  Temp 97.6  F (36.4  C) (Oral)  Resp 16  Wt (!) 162.3 kg (357 lb 12.8 oz)  SpO2 98%  BMI 41.36 kg/m2 Estimated body mass index is 41.36 kg/(m^2) as calculated from the following:    Height as of 2/12/18: 1.981 m (6' 5.99\").    Weight as of this encounter: 162.3 kg (357 lb 12.8 oz). Body surface area is 2.99 meters squared.  No Pain (0) Comment: Data Unavailable   No LMP for male patient.  Allergies reviewed: Yes  Medications reviewed: Yes    Medications: MEDICATION REFILLS NEEDED TODAY. Provider was notified.   Ambien  Pharmacy name entered into [employer]:    [id number] - [place] - [address] [place] [address] AT [address] & [facility] [place], [place] - [id number]  [id number] - [place], [place] - [address] [place] [address]    Clinical concerns: New concern is what is going on? Dr. Dee was notified.    10 minutes for nursing intake (face to face time)     Isis Calhoun CMA              "

## 2018-03-06 NOTE — LETTER
3/6/2018       RE: Reuben Padilla  [address] [place] [id number]     Dear Colleague,    Thank you for referring your patient, Reuben Padilla, to the [facility]. Please see a copy of my visit note below.    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 His tumor is positive for cytokeratin 20, negative for P63 and CK7, and HER2 is negative. He started on FOLFOX (5FU/oxaliplatin) on 5/15/2017. He had a delay in treatment from 9/5-10/2/17 due to work related injury.    He had an excellent response by imaging throughout the summer 2017.    He a mixed response by imaging in late fall 2017.    In January 2018, he was switched to taxol and cyramza.  He comes in today for follow up.      INTERVAL HISTORY: Levi comes in today for followup.  He is currently on chemotherapy with Taxol and Cyramza.  He has been receiving Cyramza every other week and Taxol 3 out of 4 weeks.  His first cycle was 01/08/2018.  Cycle 2 was 02/05/2018.  He is due for cycle 3 today, 03/05/2018.      Levi reports overall he is tolerating chemotherapy reasonably well.  He says he is having some worsening neuropathy with some numbness in his feet up to his knees bilaterally.  He has noticed a warm sensation in this, particularly in the evening time.  He feels like it has been more significant over the course of the last couple of months.       He has no issues with fevers or chills, no chest pain or shortness of breath.  He reports dyspnea when he is doing heavy exertion like with shoveling.  He has no other complaints of dyspnea and no cough.      He has had no infections.  He has had no bleeding.  He does have intermittent diarrhea and reports that he will have anywhere from 1 to 2 bowel movements per day and this will  increase to 5 or 6.  At the times he is having 5 or 6 stools per day, he will take Imodium with minimal change.  He occasionally will notice bright red blood in his stool.       He is here with his sister today.  He has many questions about his scans as well as about his [employer] testing.      His 10-point review of systems is otherwise negative.             PHYSICAL EXAMINATION  BP (!) 136/98 (BP Location: Right arm, Patient Position: Sitting, Cuff Size: Adult Large)  Pulse 76  Temp 97.6  F (36.4  C) (Oral)  Resp 16  Wt (!) 162.3 kg (357 lb 12.8 oz)  SpO2 98%  BMI 41.36 kg/m2 /90  Wt Readings from Last 4 Encounters:   03/06/18 (!) 162.3 kg (357 lb 12.8 oz)   02/19/18 (!) 161.1 kg (355 lb 3.2 oz)   02/19/18 (!) 161.1 kg (355 lb 3.2 oz)   02/12/18 (!) 162.8 kg (358 lb 14.4 oz)     Constitutional: Alert, oriented male in no visible distress.  Eyes: PERRL. Anicteric sclerae.  ENT/Mouth: OM moist and pink without lesions or thrush.  CV: RRR  Resp: CTAB throughout  Abdomen: Soft, non-tender, non-distended. Obese. Bowel sounds present. Unable to palpate liver or spleen.   Extremities: No lower extremity edema   Skin: Warm, dry. mild venous stasis changes on LE bilat.  No spine tenderness  Lymph: No cervical or supraclavicular lymphadenopathy appreciated.   Neuro: CN II-XII grossly intact.  Able to walk on tiptoes and heels.  Absent reflexed at knees bilaterally    ECOG PS: 1    LABS:     Lab Results   Component Value Date    WBC 3.9 03/06/2018     Lab Results   Component Value Date    RBC 3.80 03/06/2018     Lab Results   Component Value Date    HGB 11.7 03/06/2018     Lab Results   Component Value Date    HCT 36.3 03/06/2018     No components found for: MCT  Lab Results   Component Value Date    MCV 96 03/06/2018     Lab Results   Component Value Date    MCH 30.8 03/06/2018     Lab Results   Component Value Date    MCHC 32.2 03/06/2018     Lab Results   Component Value Date    RDW 15.9 03/06/2018      Lab Results   Component Value Date     03/06/2018       Recent Labs   Lab Test  03/06/18   0925  02/09/18   1025   NA  143  141   POTASSIUM  3.7  3.8   CHLORIDE  112*  111*   CO2  24  22   ANIONGAP  7  8   GLC  94  95   BUN  11  9   CR  0.73  0.83   NIDHI  8.6  8.6     Liver Function Studies -   Recent Labs   Lab Test  03/06/18   0925   PROTTOTAL  6.8   ALBUMIN  3.4   BILITOTAL  0.4   ALKPHOS  78   AST  18   ALT  21     CT C/A/P - mixed response with slight progression in liver and gastric mass.    [employer] testing - MET amplification and KRAS amplification; results scanned in computer    IMPRESSION/PLAN:  1. Metastatic esophogeal cancer. Levi has been on FOLFOX.  He subsequently is on Taxol and Cyramza.  I reviewed his CT scan with him today which reveals a mixed response with slight progression in his liver as well as with his gastric mass.      We also reviewed his [employer] One testing which reveals amplification of KRAS mutation as well as MET amplification.  The MET amplification makes him eligible for the crizotinib arm of the NCI MATCH clinical trial.  We reviewed the NCI MATCH clinical trial for which he is eligible.  Given his mixed response as well as his neuropathy, I favor discontinuing this therapy and having him screened and consented for this study.  We briefly discussed the outline of this study as well as the side effects of the medication.  I have contacted our clinical trials office, who will be in contact with the patient to begin this therapy.      As a result, we will hold off on any treatment today.      I anticipate bringing him back next week to initiate the course of his treatment.      He has had PD-1 testing which also is low positive.  We discussed that this will make him potentially eligible for Keytruda in the future.      He has a history of pulmonary embolism.  He is on Lovenox twice daily.  He has progressed through Xarelto in the past.       He does have baseline  neuropathy.  This is slightly worse on Taxol.  He is using oxycodone sparingly for his neuropathic pain.       He functionally is doing well and coping reasonably well.  He is here with his sister today.     Sincerely,    Kadi Dee MD

## 2018-03-06 NOTE — PROGRESS NOTES
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 His tumor is positive for cytokeratin 20, negative for P63 and CK7, and HER2 is negative. He started on FOLFOX (5FU/oxaliplatin) on 5/15/2017. He had a delay in treatment from 9/5-10/2/17 due to work related injury.    He had an excellent response by imaging throughout the summer 2017.    He a mixed response by imaging in late fall 2017.    In January 2018, he was switched to taxol and cyramza.  He comes in today for follow up.      INTERVAL HISTORY: Levi comes in today for followup.  He is currently on chemotherapy with Taxol and Cyramza.  He has been receiving Cyramza every other week and Taxol 3 out of 4 weeks.  His first cycle was 01/08/2018.  Cycle 2 was 02/05/2018.  He is due for cycle 3 today, 03/05/2018.      Levi reports overall he is tolerating chemotherapy reasonably well.  He says he is having some worsening neuropathy with some numbness in his feet up to his knees bilaterally.  He has noticed a warm sensation in this, particularly in the evening time.  He feels like it has been more significant over the course of the last couple of months.       He has no issues with fevers or chills, no chest pain or shortness of breath.  He reports dyspnea when he is doing heavy exertion like with shoveling.  He has no other complaints of dyspnea and no cough.      He has had no infections.  He has had no bleeding.  He does have intermittent diarrhea and reports that he will have anywhere from 1 to 2 bowel movements per day and this will increase to 5 or 6.  At the times he is having 5 or 6 stools per day, he will take Imodium with minimal change.  He occasionally will notice bright red blood in his stool.       He is here with his sister today.  He has many questions about his  scans as well as about his [employer] One testing.      His 10-point review of systems is otherwise negative.             PHYSICAL EXAMINATION  BP (!) 136/98 (BP Location: Right arm, Patient Position: Sitting, Cuff Size: Adult Large)  Pulse 76  Temp 97.6  F (36.4  C) (Oral)  Resp 16  Wt (!) 162.3 kg (357 lb 12.8 oz)  SpO2 98%  BMI 41.36 kg/m2 /90  Wt Readings from Last 4 Encounters:   03/06/18 (!) 162.3 kg (357 lb 12.8 oz)   02/19/18 (!) 161.1 kg (355 lb 3.2 oz)   02/19/18 (!) 161.1 kg (355 lb 3.2 oz)   02/12/18 (!) 162.8 kg (358 lb 14.4 oz)     Constitutional: Alert, oriented male in no visible distress.  Eyes: PERRL. Anicteric sclerae.  ENT/Mouth: OM moist and pink without lesions or thrush.  CV: RRR  Resp: CTAB throughout  Abdomen: Soft, non-tender, non-distended. Obese. Bowel sounds present. Unable to palpate liver or spleen.   Extremities: No lower extremity edema   Skin: Warm, dry. mild venous stasis changes on LE bilat.  No spine tenderness  Lymph: No cervical or supraclavicular lymphadenopathy appreciated.   Neuro: CN II-XII grossly intact.  Able to walk on tiptoes and heels.  Absent reflexed at knees bilaterally    ECOG PS: 1    LABS:     Lab Results   Component Value Date    WBC 3.9 03/06/2018     Lab Results   Component Value Date    RBC 3.80 03/06/2018     Lab Results   Component Value Date    HGB 11.7 03/06/2018     Lab Results   Component Value Date    HCT 36.3 03/06/2018     No components found for: MCT  Lab Results   Component Value Date    MCV 96 03/06/2018     Lab Results   Component Value Date    MCH 30.8 03/06/2018     Lab Results   Component Value Date    MCHC 32.2 03/06/2018     Lab Results   Component Value Date    RDW 15.9 03/06/2018     Lab Results   Component Value Date     03/06/2018       Recent Labs   Lab Test  03/06/18   0925  02/09/18   1025   NA  143  141   POTASSIUM  3.7  3.8   CHLORIDE  112*  111*   CO2  24  22   ANIONGAP  7  8   GLC  94  95   BUN  11  9   CR   0.73  0.83   NIDHI  8.6  8.6     Liver Function Studies -   Recent Labs   Lab Test  03/06/18   0925   PROTTOTAL  6.8   ALBUMIN  3.4   BILITOTAL  0.4   ALKPHOS  78   AST  18   ALT  21     CT C/A/P - mixed response with slight progression in liver and gastric mass.    [employer] One testing - MET amplification and KRAS amplification; results scanned in computer    IMPRESSION/PLAN:  1. Metastatic esophogeal cancer. Levi has been on FOLFOX.  He subsequently is on Taxol and Cyramza.  I reviewed his CT scan with him today which reveals a mixed response with slight progression in his liver as well as with his gastric mass.      We also reviewed his [employer] One testing which reveals amplification of KRAS mutation as well as MET amplification.  The MET amplification makes him eligible for the crizotinib arm of the NCI MATCH clinical trial.  We reviewed the NCI MATCH clinical trial for which he is eligible.  Given his mixed response as well as his neuropathy, I favor discontinuing this therapy and having him screened and consented for this study.  We briefly discussed the outline of this study as well as the side effects of the medication.  I have contacted our clinical trials office, who will be in contact with the patient to begin this therapy.      As a result, we will hold off on any treatment today.      I anticipate bringing him back next week to initiate the course of his treatment.      He has had PD-1 testing which also is low positive.  We discussed that this will make him potentially eligible for Keytruda in the future.      He has a history of pulmonary embolism.  He is on Lovenox twice daily.  He has progressed through Xarelto in the past.       He does have baseline neuropathy.  This is slightly worse on Taxol.  He is using oxycodone sparingly for his neuropathic pain.       He functionally is doing well and coping reasonably well.  He is here with his sister today.

## 2018-03-06 NOTE — NURSING NOTE
Port de-access per provider. Bandage placed over site. Patient tolerated with no complications.     Isis Calhoun, CMA

## 2018-03-06 NOTE — LETTER
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 His tumor is positive for cytokeratin 20, negative for P63 and CK7, and HER2 is negative. He started on FOLFOX (5FU/oxaliplatin) on 5/15/2017. He had a delay in treatment from 9/5-10/2/17 due to work related injury.    He had an excellent response by imaging throughout the summer 2017.    He a mixed response by imaging in late fall 2017.    In January 2018, he was switched to taxol and cyramza.  He comes in today for follow up.      INTERVAL HISTORY: Levi comes in today for followup.  He is currently on chemotherapy with Taxol and Cyramza.  He has been receiving Cyramza every other week and Taxol 3 out of 4 weeks.  His first cycle was 01/08/2018.  Cycle 2 was 02/05/2018.  He is due for cycle 3 today, 03/05/2018.      Levi reports overall he is tolerating chemotherapy reasonably well.  He says he is having some worsening neuropathy with some numbness in his feet up to his knees bilaterally.  He has noticed a warm sensation in this, particularly in the evening time.  He feels like it has been more significant over the course of the last couple of months.       He has no issues with fevers or chills, no chest pain or shortness of breath.  He reports dyspnea when he is doing heavy exertion like with shoveling.  He has no other complaints of dyspnea and no cough.      He has had no infections.  He has had no bleeding.  He does have intermittent diarrhea and reports that he will have anywhere from 1 to 2 bowel movements per day and this will increase to 5 or 6.  At the times he is having 5 or 6 stools per day, he will take Imodium with minimal change.  He occasionally will notice bright red blood in  his stool.       He is here with his sister today.  He has many questions about his scans as well as about his [employer] One testing.      His 10-point review of systems is otherwise negative.             PHYSICAL EXAMINATION  BP (!) 136/98 (BP Location: Right arm, Patient Position: Sitting, Cuff Size: Adult Large)  Pulse 76  Temp 97.6  F (36.4  C) (Oral)  Resp 16  Wt (!) 162.3 kg (357 lb 12.8 oz)  SpO2 98%  BMI 41.36 kg/m2 /90  Wt Readings from Last 4 Encounters:   03/06/18 (!) 162.3 kg (357 lb 12.8 oz)   02/19/18 (!) 161.1 kg (355 lb 3.2 oz)   02/19/18 (!) 161.1 kg (355 lb 3.2 oz)   02/12/18 (!) 162.8 kg (358 lb 14.4 oz)     Constitutional: Alert, oriented male in no visible distress.  Eyes: PERRL. Anicteric sclerae.  ENT/Mouth: OM moist and pink without lesions or thrush.  CV: RRR  Resp: CTAB throughout  Abdomen: Soft, non-tender, non-distended. Obese. Bowel sounds present. Unable to palpate liver or spleen.   Extremities: No lower extremity edema   Skin: Warm, dry. mild venous stasis changes on LE bilat.  No spine tenderness  Lymph: No cervical or supraclavicular lymphadenopathy appreciated.   Neuro: CN II-XII grossly intact.  Able to walk on tiptoes and heels.  Absent reflexed at knees bilaterally    ECOG PS: 1    LABS:     Lab Results   Component Value Date    WBC 3.9 03/06/2018     Lab Results   Component Value Date    RBC 3.80 03/06/2018     Lab Results   Component Value Date    HGB 11.7 03/06/2018     Lab Results   Component Value Date    HCT 36.3 03/06/2018     No components found for: MCT  Lab Results   Component Value Date    MCV 96 03/06/2018     Lab Results   Component Value Date    MCH 30.8 03/06/2018     Lab Results   Component Value Date    MCHC 32.2 03/06/2018     Lab Results   Component Value Date    RDW 15.9 03/06/2018     Lab Results   Component Value Date     03/06/2018       Recent Labs   Lab Test  03/06/18   0925  02/09/18   1025   NA  143  141   POTASSIUM  3.7  3.8    CHLORIDE  112*  111*   CO2  24  22   ANIONGAP  7  8   GLC  94  95   BUN  11  9   CR  0.73  0.83   NIDHI  8.6  8.6     Liver Function Studies -   Recent Labs   Lab Test  03/06/18   0925   PROTTOTAL  6.8   ALBUMIN  3.4   BILITOTAL  0.4   ALKPHOS  78   AST  18   ALT  21     CT C/A/P - mixed response with slight progression in liver and gastric mass.    [employer] One testing - MET amplification and KRAS amplification; results scanned in computer    IMPRESSION/PLAN:  1. Metastatic esophogeal cancer. Levi has been on FOLFOX.  He subsequently is on Taxol and Cyramza.  I reviewed his CT scan with him today which reveals a mixed response with slight progression in his liver as well as with his gastric mass.      We also reviewed his [employer] One testing which reveals amplification of KRAS mutation as well as MET amplification.  The MET amplification makes him eligible for the crizotinib arm of the NCI MATCH clinical trial.  We reviewed the NCI MATCH clinical trial for which he is eligible.  Given his mixed response as well as his neuropathy, I favor discontinuing this therapy and having him screened and consented for this study.  We briefly discussed the outline of this study as well as the side effects of the medication.  I have contacted our clinical trials office, who will be in contact with the patient to begin this therapy.      As a result, we will hold off on any treatment today.      I anticipate bringing him back next week to initiate the course of his treatment.      He has had PD-1 testing which also is low positive.  We discussed that this will make him potentially eligible for Keytruda in the future.      He has a history of pulmonary embolism.  He is on Lovenox twice daily.  He has progressed through Xarelto in the past.       He does have baseline neuropathy.  This is slightly worse on Taxol.  He is using oxycodone sparingly for his neuropathic pain.       He functionally is doing well and coping  reasonably well.  He is here with his sister today.          Kadi Dee MD

## 2018-03-06 NOTE — MR AVS SNAPSHOT
After Visit Summary   3/6/2018    Reuben Padilla    MRN: [id number]           Patient Information     Date Of Birth          1960        Visit Information        Provider Department      3/6/2018 12:30 PM Kadi Dee MD [facility]        Today's Diagnoses     Diarrhea, unspecified type    -  1    Metastasis from gastric cancer (H)           Follow-ups after your visit        Your next 10 appointments already scheduled     Mar 13, 2018  2:30 PM CDT   [facility] Lab Draw with  [employer] LAB DRAW   [facility] Lab Draw ([facility])    [address] [place] [address]  [address]  [facility] [id number] [phone]            Mar 13, 2018  3:00 PM CDT   (Arrive by 2:45 PM)   Return Visit with Kadi Dee MD   [facility] ([facility])    [address] [place] [address]  [address]  [facility] [id number] [phone]              Future tests that were ordered for you today     Open Future Orders        Priority Expected Expires Ordered    Heparin 10a Level Routine 3/6/2018 3/6/2019 3/6/2018            Who to contact     If you have questions or need follow up information about today's clinic visit or your schedule please contact [facility] directly at [phone].  Normal or non-critical lab and imaging results will be communicated to you by MyChart, letter or phone within 4 business days after the clinic has received the results. If you do not hear from us within 7 days, please contact the clinic through MyChart or phone. If you have a critical or abnormal lab result, we will notify you by phone as soon as possible.  Submit refill requests through [employer] or call your pharmacy and they will forward the refill request to us. Please allow 3 business days for your refill to be completed.          Additional Information About Your Visit        [employer] Information     [employer] gives you secure  access to your electronic health record. If you see a primary care provider, you can also send messages to your care team and make appointments. If you have questions, please call your primary care clinic.  If you do not have a primary care provider, please call [phone] and they will assist you.        Care EveryWhere ID     This is your Care EveryWhere ID. This could be used by other organizations to access your [place] medical records  [id number]        Your Vitals Were     Pulse Temperature Respirations Pulse Oximetry BMI (Body Mass Index)       76 97.6  F (36.4  C) (Oral) 16 98% 41.36 kg/m2        Blood Pressure from Last 3 Encounters:   03/06/18 (!) 136/98   02/19/18 (!) 137/91   02/19/18 (!) 137/91    Weight from Last 3 Encounters:   03/06/18 (!) 162.3 kg (357 lb 12.8 oz)   02/19/18 (!) 161.1 kg (355 lb 3.2 oz)   02/19/18 (!) 161.1 kg (355 lb 3.2 oz)              We Performed the Following     CBC with platelets differential     Comprehensive metabolic panel          Today's Medication Changes          These changes are accurate as of 3/6/18  2:12 PM.  If you have any questions, ask your nurse or doctor.               Start taking these medicines.        Dose/Directions    diphenoxylate-atropine 2.5-0.025 MG per tablet   Commonly known as:  LOMOTIL   Used for:  Diarrhea, unspecified type   Started by:  Kadi Dee MD        Dose:  1 tablet   Take 1 tablet by mouth 4 times daily as needed for diarrhea   Quantity:  40 tablet   Refills:  1            Where to get your medicines      Some of these will need a paper prescription and others can be bought over the counter.  Ask your nurse if you have questions.     Bring a paper prescription for each of these medications     diphenoxylate-atropine 2.5-0.025 MG per tablet    zolpidem 10 MG tablet                Primary Care Provider Fax #    Physician No Ref-Primary [phone]       No address on file        Equal Access to Services     OLLIE SHARPE  AH: Andresii antonio mixonolgaderik Germán, wajeanda luqadaha, qaybta kabecca sotelo geraldo vernon valorieotilio paintingpalthao cardenas. So [facility] [phone].    ATENCIÓN: Si alayna rodriguez, tiene a alamo disposición servicios gratuitos de asistencia lingüística. Llame al [phone].    We comply with applicable federal civil rights laws and [place] laws. We do not discriminate on the basis of race, color, national origin, age, disability, sex, sexual orientation, or gender identity.            Thank you!     Thank you for choosing [facility]  for your care. Our goal is always to provide you with excellent care. Hearing back from our patients is one way we can continue to improve our services. Please take a few minutes to complete the written survey that you may receive in the mail after your visit with us. Thank you!             Your Updated Medication List - Protect others around you: Learn how to safely use, store and throw away your medicines at www.disposemymeds.org.          This list is accurate as of 3/6/18  2:12 PM.  Always use your most recent med list.                   Brand Name Dispense Instructions for use Diagnosis    diphenoxylate-atropine 2.5-0.025 MG per tablet    LOMOTIL    40 tablet    Take 1 tablet by mouth 4 times daily as needed for diarrhea    Diarrhea, unspecified type       enoxaparin 80 MG/0.8ML injection    LOVENOX    60 Syringe    Inject 1.3 mLs (130 mg) Subcutaneous 2 times daily    Cancer of distal third of esophagus (H)       fish oil-omega-3 fatty acids 1000 MG capsule      Take 2 g by mouth daily        IBUPROFEN PO      Take 600 mg by mouth every 4 hours as needed for moderate pain        lidocaine-prilocaine cream    EMLA    30 g    Apply topically as needed for moderate pain    Cancer of distal third of esophagus (H)       LORazepam 0.5 MG tablet    ATIVAN    30 tablet    Take 1 tablet (0.5 mg) by mouth every 4 hours as needed (Anxiety, Nausea/Vomiting or Sleep)    Cancer  of distal third of esophagus (H)       Multi-vitamin Tabs tablet      Take 1 tablet by mouth daily        ondansetron 8 MG tablet    ZOFRAN    30 tablet    Take 1 tablet (8 mg) by mouth every 8 hours as needed (Nausea/Vomiting)    Cancer of distal third of esophagus (H)       prochlorperazine 10 MG tablet    COMPAZINE    30 tablet    Take 1 tablet (10 mg) by mouth every 6 hours as needed (Nausea/Vomiting)    Cancer of distal third of esophagus (H)       timolol 0.5 % ophthalmic solution    TIMOPTIC     Place into both eyes daily    Cancer of distal third of esophagus (H)       zolpidem 10 MG tablet    AMBIEN    60 tablet    Take 1 tablet (10 mg) by mouth nightly as needed    Metastasis from gastric cancer (H)

## 2018-03-06 NOTE — PROGRESS NOTES
Spoke with Jennifer, pt's sister who shared several concerns on pt's behalf (he was in the background during discussion)  Levi is expressing the need for more communication and contact with Dr Dee, he feels 2 months between appointments with his Oncologist is not acceptable. Discussed the use of BERENICE's in clinic and the rationale for this. He prefers more consistency with his provider and expressed this was working prior to his change in treatment regimen. Discussed it may not be possible to see Dr Dee each clinic visit and explored options that would be helpful to ease the anxiety between visits directly with her. Writer has agreed to call patient weekly to check in on him, his sister believes this will help his anxiety greatly. Assured them that Dr Dee is in close communication with the BERENICE's that are treating him.  They expressed frustration on the length of time it took to receive [employer] results. Supported their feelings and  explained to the best of writers ability the reason for the delay in foundation one testing which included the incorrect specimen being requested by the company.    Jennifer verbalized agreement with plan and closer level of monitoring from care coordination.

## 2018-03-07 ENCOUNTER — TELEPHONE (OUTPATIENT)
Dept: ONCOLOGY | Facility: CLINIC | Age: 58
End: 2018-03-07

## 2018-03-07 NOTE — TELEPHONE ENCOUNTER
Per Patient's request,  completed and faxed [facility] referral for lodging for 3/13 - 3/14.  [facility] will contact Patient directly for confirmation of reservation.  will continue to provide support as needed.        GASPER Tillman, MSW   - [facility]  Phone: [phone]  Pager: [phone]  3/7/2018

## 2018-03-08 ENCOUNTER — TELEPHONE (OUTPATIENT)
Dept: ONCOLOGY | Facility: CLINIC | Age: 58
End: 2018-03-08

## 2018-03-08 NOTE — TELEPHONE ENCOUNTER
RESEARCH:    Copy of the consent form for MATCH C-1 was emailed to patient yesterday. I called him this morning to follow up.  He was in his car on his way home and will look at it as soon as he gets home.    Provided some general information:  Oral med taken BID; monthly visits.  He is aware he will need to return for consenting and labs and then again for a provider visit, labs and to receive a monthly supply of medications.  Since he was expecting to return on Tuesday, 3/14,  I will make consenting and lab apts for that day.  He understands he will not see Dr. Dee that day.    Pt requested I send a copy of the consent form to his sister.  Phone message left on her cell phone to call me with email address and for update.      Kate Ward RN  Clinical Research Coordinator        [id number]  RESEARCH:    Patient's sister returned my call and was presented with the same info as above. Her email is [email or web address].      Kate Ward RN  Clinical Research Coordinator

## 2018-03-09 DIAGNOSIS — C15.5 CANCER OF DISTAL THIRD OF ESOPHAGUS (H): Primary | ICD-10-CM

## 2018-03-09 DIAGNOSIS — Z00.6 EXAMINATION OF PARTICIPANT IN CLINICAL TRIAL: ICD-10-CM

## 2018-03-12 DIAGNOSIS — Z00.6 EXAMINATION OF PARTICIPANT IN CLINICAL TRIAL: ICD-10-CM

## 2018-03-12 DIAGNOSIS — C15.5 CANCER OF DISTAL THIRD OF ESOPHAGUS (H): Primary | ICD-10-CM

## 2018-03-13 ENCOUNTER — ALLIED HEALTH/NURSE VISIT (OUTPATIENT)
Dept: ONCOLOGY | Facility: CLINIC | Age: 58
End: 2018-03-13
Attending: INTERNAL MEDICINE
Payer: COMMERCIAL

## 2018-03-13 VITALS
BODY MASS INDEX: 37.19 KG/M2 | RESPIRATION RATE: 16 BRPM | OXYGEN SATURATION: 97 % | SYSTOLIC BLOOD PRESSURE: 133 MMHG | WEIGHT: 315 LBS | DIASTOLIC BLOOD PRESSURE: 85 MMHG | HEIGHT: 77 IN | TEMPERATURE: 97.2 F | HEART RATE: 96 BPM

## 2018-03-13 DIAGNOSIS — Z00.6 EXAMINATION OF PARTICIPANT IN CLINICAL TRIAL: ICD-10-CM

## 2018-03-13 DIAGNOSIS — C15.5 CANCER OF DISTAL THIRD OF ESOPHAGUS (H): ICD-10-CM

## 2018-03-13 DIAGNOSIS — C15.5 CANCER OF DISTAL THIRD OF ESOPHAGUS (H): Primary | ICD-10-CM

## 2018-03-13 LAB
ALBUMIN SERPL-MCNC: 3.3 G/DL (ref 3.4–5)
ALP SERPL-CCNC: 76 U/L (ref 40–150)
ALT SERPL W P-5'-P-CCNC: 32 U/L (ref 0–70)
ANION GAP SERPL CALCULATED.3IONS-SCNC: 6 MMOL/L (ref 3–14)
AST SERPL W P-5'-P-CCNC: 30 U/L (ref 0–45)
BASOPHILS # BLD AUTO: 0 10E9/L (ref 0–0.2)
BASOPHILS NFR BLD AUTO: 0.5 %
BILIRUB SERPL-MCNC: 0.7 MG/DL (ref 0.2–1.3)
BUN SERPL-MCNC: 10 MG/DL (ref 7–30)
CALCIUM SERPL-MCNC: 8.6 MG/DL (ref 8.5–10.1)
CHLORIDE SERPL-SCNC: 110 MMOL/L (ref 94–109)
CO2 SERPL-SCNC: 24 MMOL/L (ref 20–32)
CREAT SERPL-MCNC: 0.78 MG/DL (ref 0.66–1.25)
DIFFERENTIAL METHOD BLD: ABNORMAL
EOSINOPHIL # BLD AUTO: 0 10E9/L (ref 0–0.7)
EOSINOPHIL NFR BLD AUTO: 0.3 %
ERYTHROCYTE [DISTWIDTH] IN BLOOD BY AUTOMATED COUNT: 16 % (ref 10–15)
GFR SERPL CREATININE-BSD FRML MDRD: >90 ML/MIN/1.7M2
GLUCOSE SERPL-MCNC: 93 MG/DL (ref 70–99)
HCT VFR BLD AUTO: 37.3 % (ref 40–53)
HGB BLD-MCNC: 11.9 G/DL (ref 13.3–17.7)
IMM GRANULOCYTES # BLD: 0 10E9/L (ref 0–0.4)
IMM GRANULOCYTES NFR BLD: 0.3 %
LDH SERPL L TO P-CCNC: 159 U/L (ref 85–227)
LYMPHOCYTES # BLD AUTO: 0.9 10E9/L (ref 0.8–5.3)
LYMPHOCYTES NFR BLD AUTO: 23.5 %
MAGNESIUM SERPL-MCNC: 1.9 MG/DL (ref 1.6–2.3)
MCH RBC QN AUTO: 30.2 PG (ref 26.5–33)
MCHC RBC AUTO-ENTMCNC: 31.9 G/DL (ref 31.5–36.5)
MCV RBC AUTO: 95 FL (ref 78–100)
MONOCYTES # BLD AUTO: 0.3 10E9/L (ref 0–1.3)
MONOCYTES NFR BLD AUTO: 8.4 %
NEUTROPHILS # BLD AUTO: 2.6 10E9/L (ref 1.6–8.3)
NEUTROPHILS NFR BLD AUTO: 67 %
NRBC # BLD AUTO: 0 10*3/UL
NRBC BLD AUTO-RTO: 0 /100
PHOSPHATE SERPL-MCNC: 2.6 MG/DL (ref 2.5–4.5)
PLATELET # BLD AUTO: 102 10E9/L (ref 150–450)
POTASSIUM SERPL-SCNC: 3.8 MMOL/L (ref 3.4–5.3)
PROT SERPL-MCNC: 6.9 G/DL (ref 6.8–8.8)
RBC # BLD AUTO: 3.94 10E12/L (ref 4.4–5.9)
SODIUM SERPL-SCNC: 140 MMOL/L (ref 133–144)
WBC # BLD AUTO: 3.8 10E9/L (ref 4–11)

## 2018-03-13 PROCEDURE — 84100 ASSAY OF PHOSPHORUS: CPT | Performed by: INTERNAL MEDICINE

## 2018-03-13 PROCEDURE — 25000128 H RX IP 250 OP 636: Performed by: INTERNAL MEDICINE

## 2018-03-13 PROCEDURE — 36591 DRAW BLOOD OFF VENOUS DEVICE: CPT

## 2018-03-13 PROCEDURE — 80053 COMPREHEN METABOLIC PANEL: CPT | Performed by: INTERNAL MEDICINE

## 2018-03-13 PROCEDURE — 93010 ELECTROCARDIOGRAM REPORT: CPT | Performed by: INTERNAL MEDICINE

## 2018-03-13 PROCEDURE — 93005 ELECTROCARDIOGRAM TRACING: CPT

## 2018-03-13 PROCEDURE — 85025 COMPLETE CBC W/AUTO DIFF WBC: CPT | Performed by: INTERNAL MEDICINE

## 2018-03-13 PROCEDURE — 83615 LACTATE (LD) (LDH) ENZYME: CPT | Performed by: INTERNAL MEDICINE

## 2018-03-13 PROCEDURE — 83735 ASSAY OF MAGNESIUM: CPT | Performed by: INTERNAL MEDICINE

## 2018-03-13 RX ORDER — HEPARIN SODIUM (PORCINE) LOCK FLUSH IV SOLN 100 UNIT/ML 100 UNIT/ML
5 SOLUTION INTRAVENOUS ONCE
Status: COMPLETED | OUTPATIENT
Start: 2018-03-13 | End: 2018-03-13

## 2018-03-13 RX ADMIN — SODIUM CHLORIDE, PRESERVATIVE FREE 5 ML: 5 INJECTION INTRAVENOUS at 12:06

## 2018-03-13 NOTE — MR AVS SNAPSHOT
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)        Examination of participant in clinical trial           Follow-ups after your visit        Your next 10 appointments already scheduled     Mar 21, 2018 10:00 AM CDT   [facility] Lab Draw with [facility] LAB DRAW   [facility] Lab Draw ([facility])    [address] [place] [address]  [address]  [facility] [address]   [phone]            Mar 21, 2018 10:30 AM CDT   (Arrive by 10:15 AM)   Return Visit with Katalina Ramirez PA-C   [facility] ([facility])    [address] [place] [address]  [address]  [facility] [address]   [phone]            Mar 21, 2018 11:30 AM CDT   RESEARCH with  Oncology Research Coordinator   [facility] ([facility])    [address] [place] [address]  [address]  [facility] [address]   [phone]              Future tests that were ordered for you today     Open Future Orders        Priority Expected Expires Ordered    CBC with platelets differential Routine 3/21/2018 3/23/2018 3/12/2018    Comprehensive metabolic panel Routine 3/21/2018 3/23/2018 3/12/2018    Lactate Dehydrogenase Routine 3/21/2018 3/23/2018 3/12/2018    Magnesium Routine 3/21/2018 3/23/2018 3/12/2018    Phosphorus Routine 3/21/2018 3/23/2018 3/12/2018    CBC with platelets differential Routine 3/13/2018 3/16/2018 3/12/2018    Comprehensive metabolic panel Routine 3/13/2018 3/16/2018 3/12/2018    Magnesium Routine 3/13/2018 3/16/2018 3/12/2018    Lactate Dehydrogenase Routine 3/13/2018 3/16/2018 3/12/2018    Phosphorus Routine 3/13/2018 3/16/2018 3/12/2018           "  Who to contact     If you have questions or need follow up information about today's clinic visit or your schedule please contact [facility] directly at [phone].  Normal or non-critical lab and imaging results will be communicated to you by MyChart, letter or phone within 4 business days after the clinic has received the results. If you do not hear from us within 7 days, please contact the clinic through [employer] or phone. If you have a critical or abnormal lab result, we will notify you by phone as soon as possible.  Submit refill requests through [employer] or call your pharmacy and they will forward the refill request to us. Please allow 3 business days for your refill to be completed.          Additional Information About Your Visit        [employer] Information     [employer] gives you secure access to your electronic health record. If you see a primary care provider, you can also send messages to your care team and make appointments. If you have questions, please call your primary care clinic.  If you do not have a primary care provider, please call [phone] and they will assist you.        Care EveryWhere ID     This is your Care EveryWhere ID. This could be used by other organizations to access your [place] medical records  [id number]        Your Vitals Were     Pulse Temperature Respirations Height Pulse Oximetry BMI (Body Mass Index)    96 97.2  F (36.2  C) (Oral) 16 1.956 m (6' 5\") 97% 42.61 kg/m2       Blood Pressure from Last 3 Encounters:   03/13/18 133/85   03/06/18 (!) 136/98   02/19/18 (!) 137/91    Weight from Last 3 Encounters:   03/13/18 (!) 163 kg (359 lb 4.8 oz)   03/06/18 (!) 162.3 kg (357 lb 12.8 oz)   02/19/18 (!) 161.1 kg (355 lb 3.2 oz)              We Performed the Following     EKG 12-LEAD CLINIC READ        Primary Care Provider Fax #    Physician No Ref-Primary [phone]       No address on file        Equal Access to Services     OLLIE SHARPE AH: Hadii aad ku " harleen Dunlap, wajeanda lumegganadaha, qaaileenta kabecca sotelo, geraldo johnnyin hayaaotilio dhillonlauryn mertjimi padmini ronald. So [facility] [place] [phone].    ATENCIÓN: Si alayna rodriguez, tiene a alamo disposición servicios gratuitos de asistencia lingüística. Tanisha al [phone].    We comply with applicable federal civil rights laws and [place] laws. We do not discriminate on the basis of race, color, national origin, age, disability, sex, sexual orientation, or gender identity.            Thank you!     Thank you for choosing [facility]  for your care. Our goal is always to provide you with excellent care. Hearing back from our patients is one way we can continue to improve our services. Please take a few minutes to complete the written survey that you may receive in the mail after your visit with us. Thank you!             Your Updated Medication List - Protect others around you: Learn how to safely use, store and throw away your medicines at www.disposemymeds.org.          This list is accurate as of 3/13/18 11:58 AM.  Always use your most recent med list.                   Brand Name Dispense Instructions for use Diagnosis    diphenoxylate-atropine 2.5-0.025 MG per tablet    LOMOTIL    40 tablet    Take 1 tablet by mouth 4 times daily as needed for diarrhea    Diarrhea, unspecified type       enoxaparin 80 MG/0.8ML injection    LOVENOX    60 Syringe    Inject 1.3 mLs (130 mg) Subcutaneous 2 times daily    Cancer of distal third of esophagus (H)       fish oil-omega-3 fatty acids 1000 MG capsule      Take 2 g by mouth daily        IBUPROFEN PO      Take 600 mg by mouth every 4 hours as needed for moderate pain        lidocaine-prilocaine cream    EMLA    30 g    Apply topically as needed for moderate pain    Cancer of distal third of esophagus (H)       LORazepam 0.5 MG tablet    ATIVAN    30 tablet    Take 1 tablet (0.5 mg) by mouth every 4 hours as needed (Anxiety, Nausea/Vomiting or Sleep)    Cancer of distal third  of esophagus (H)       Multi-vitamin Tabs tablet      Take 1 tablet by mouth daily        ondansetron 8 MG tablet    ZOFRAN    30 tablet    Take 1 tablet (8 mg) by mouth every 8 hours as needed (Nausea/Vomiting)    Cancer of distal third of esophagus (H)       prochlorperazine 10 MG tablet    COMPAZINE    30 tablet    Take 1 tablet (10 mg) by mouth every 6 hours as needed (Nausea/Vomiting)    Cancer of distal third of esophagus (H)       timolol 0.5 % ophthalmic solution    TIMOPTIC     Place into both eyes daily    Cancer of distal third of esophagus (H)       zolpidem 10 MG tablet    AMBIEN    60 tablet    Take 1 tablet (10 mg) by mouth nightly as needed    Metastasis from gastric cancer (H)

## 2018-03-13 NOTE — NURSING NOTE
RESEARCH:    I met with patient and his sister to discuss the MATCH study subprotocol C-1, for which he qualified by having the MET amplification mutation.    After a lengthy discussion and several questions, pt signed the consent form for the study.  Following the consent-signing, patient was taken for an EKG and lab draws per the study protocol.    Patient is aware the labs and EKG will determine final eligibility for the study. That data will be collected and sent to the study sponsor.  As soon as I hear news regarding his eligibility, I will update patient, sister, MD and RNCC.    Kate Ward RN  Research Coordinator

## 2018-03-13 NOTE — MR AVS SNAPSHOT
After Visit Summary   3/13/2018    Reuben Padilla    MRN: [id number]           Patient Information     Date Of Birth          1960        Visit Information        Provider Department      3/13/2018 10:30 AM Coordinator,  Oncology Research;  [address] [facility]        Today's Diagnoses     Cancer of distal third of esophagus (H)    -  1    Examination of participant in clinical trial           Follow-ups after your visit        Your next 10 appointments already scheduled     Mar 21, 2018 10:00 AM CDT   [facility] Lab Draw with [facility] LAB DRAW   [facility] Lab Draw ([facility])    [address]  [address]  [facility] [address] [phone]            Mar 21, 2018 10:30 AM CDT   (Arrive by 10:15 AM)   Return Visit with Katalina Ramirez PA-C   [facility] ([facility])    [address]  [address]  [facility] [id number] [phone]            Mar 21, 2018 11:30 AM CDT   RESEARCH with  Oncology Research Coordinator   [facility] ([facility])    [address]  [address]  [facility] [address] [phone]              Future tests that were ordered for you today     Open Future Orders        Priority Expected Expires Ordered    CBC with platelets differential Routine 3/21/2018 3/23/2018 3/12/2018    Comprehensive metabolic panel Routine 3/21/2018 3/23/2018 3/12/2018    Lactate Dehydrogenase Routine 3/21/2018 3/23/2018 3/12/2018    Magnesium Routine 3/21/2018 3/23/2018 3/12/2018    Phosphorus Routine 3/21/2018 3/23/2018 3/12/2018            Who to contact     If you have questions or need follow up information about today's clinic visit or your schedule please contact [facility] directly at [phone].  Normal or non-critical lab and imaging results will be communicated to you by  MyChart, letter or phone within 4 business days after the clinic has received the results. If you do not hear from us within 7 days, please contact the clinic through [employer] or phone. If you have a critical or abnormal lab result, we will notify you by phone as soon as possible.  Submit refill requests through [employer] or call your pharmacy and they will forward the refill request to us. Please allow 3 business days for your refill to be completed.          Additional Information About Your Visit        [employer] Information     [employer] gives you secure access to your electronic health record. If you see a primary care provider, you can also send messages to your care team and make appointments. If you have questions, please call your primary care clinic.  If you do not have a primary care provider, please call [phone] and they will assist you.        Care EveryWhere ID     This is your Care EveryWhere ID. This could be used by other organizations to access your [place] medical records  [id number]         Blood Pressure from Last 3 Encounters:   03/13/18 133/85   03/06/18 (!) 136/98   02/19/18 (!) 137/91    Weight from Last 3 Encounters:   03/13/18 (!) 163 kg (359 lb 4.8 oz)   03/06/18 (!) 162.3 kg (357 lb 12.8 oz)   02/19/18 (!) 161.1 kg (355 lb 3.2 oz)              Today, you had the following     No orders found for display       Primary Care Provider Fax #    Physician No Ref-Primary [phone]       No address on file        Equal Access to Services     OLLIE SHARPE : Hadii aad ku hadasho Soomaali, waaxda luqadaha, qaybta kaalmada adeegyada, geraldo [place] . So [facility] [phone].    ATENCIÓN: Si habla español, tiene a alamo disposición servicios gratuitos de asistencia lingüística. Llame al [phone].    We comply with applicable federal civil rights laws and [place] laws. We do not discriminate on the basis of race, color, national origin, age, disability, sex, sexual  orientation, or gender identity.            Thank you!     Thank you for choosing [facility]  for your care. Our goal is always to provide you with excellent care. Hearing back from our patients is one way we can continue to improve our services. Please take a few minutes to complete the written survey that you may receive in the mail after your visit with us. Thank you!             Your Updated Medication List - Protect others around you: Learn how to safely use, store and throw away your medicines at www.disposemymeds.org.          This list is accurate as of 3/13/18 12:21 PM.  Always use your most recent med list.                   Brand Name Dispense Instructions for use Diagnosis    diphenoxylate-atropine 2.5-0.025 MG per tablet    LOMOTIL    40 tablet    Take 1 tablet by mouth 4 times daily as needed for diarrhea    Diarrhea, unspecified type       enoxaparin 80 MG/0.8ML injection    LOVENOX    60 Syringe    Inject 1.3 mLs (130 mg) Subcutaneous 2 times daily    Cancer of distal third of esophagus (H)       fish oil-omega-3 fatty acids 1000 MG capsule      Take 2 g by mouth daily        IBUPROFEN PO      Take 600 mg by mouth every 4 hours as needed for moderate pain        lidocaine-prilocaine cream    EMLA    30 g    Apply topically as needed for moderate pain    Cancer of distal third of esophagus (H)       LORazepam 0.5 MG tablet    ATIVAN    30 tablet    Take 1 tablet (0.5 mg) by mouth every 4 hours as needed (Anxiety, Nausea/Vomiting or Sleep)    Cancer of distal third of esophagus (H)       Multi-vitamin Tabs tablet      Take 1 tablet by mouth daily        ondansetron 8 MG tablet    ZOFRAN    30 tablet    Take 1 tablet (8 mg) by mouth every 8 hours as needed (Nausea/Vomiting)    Cancer of distal third of esophagus (H)       prochlorperazine 10 MG tablet    COMPAZINE    30 tablet    Take 1 tablet (10 mg) by mouth every 6 hours as needed (Nausea/Vomiting)    Cancer of distal third of  esophagus (H)       timolol 0.5 % ophthalmic solution    TIMOPTIC     Place into both eyes daily    Cancer of distal third of esophagus (H)       zolpidem 10 MG tablet    AMBIEN    60 tablet    Take 1 tablet (10 mg) by mouth nightly as needed    Metastasis from gastric cancer (H)

## 2018-03-13 NOTE — NURSING NOTE
"Chief Complaint   Patient presents with     Port Draw     Labs drawn from port by RN. Line flushed with saline and heparin.     Port accessed with 20g 3/4\" gripper needle by RN, labs collected, line flushed with saline and heparin.    Carley Phipps, RN  "

## 2018-03-14 LAB — INTERPRETATION ECG - MUSE: NORMAL

## 2018-03-15 ENCOUNTER — RESEARCH ENCOUNTER (OUTPATIENT)
Dept: ONCOLOGY | Facility: CLINIC | Age: 58
End: 2018-03-15

## 2018-03-19 ENCOUNTER — CARE COORDINATION (OUTPATIENT)
Dept: ONCOLOGY | Facility: CLINIC | Age: 58
End: 2018-03-19

## 2018-03-19 NOTE — PROGRESS NOTES
Spoke with Levi to update him on the change of appointments to Thursday 3/22 in order to accommodate his request to see Dr Dee prior to starting the Match trial.  He expressed continued frustration with communication surrounding his care and now enrollment into a study.  Encouraged him to discuss this openly with Dr Dee and the  on Thursday.  Writer offered to provide him with the phone number to patient relations, he has declined at this time,  Writer will continue to call on a weekly and prn needed basis as negotiated with patient.

## 2018-03-20 ENCOUNTER — TELEPHONE (OUTPATIENT)
Dept: ONCOLOGY | Facility: CLINIC | Age: 58
End: 2018-03-20

## 2018-03-20 NOTE — TELEPHONE ENCOUNTER
RESEARCH:    Patient informed the drug may not be available in time for his visit on Thursday. I will still plan on meeting with the patient to review dosing instructions and use of the diary.    As soon as the drug is available, the drug will be shipped out from the [facility].  Patient will be asked to let us know when he receives it and when he starts to take the drug.    Kate Ward RN  Research Coordinator

## 2018-03-22 ENCOUNTER — ONCOLOGY VISIT (OUTPATIENT)
Dept: ONCOLOGY | Facility: CLINIC | Age: 58
End: 2018-03-22
Attending: PHYSICIAN ASSISTANT
Payer: COMMERCIAL

## 2018-03-22 ENCOUNTER — APPOINTMENT (OUTPATIENT)
Dept: LAB | Facility: CLINIC | Age: 58
End: 2018-03-22
Attending: INTERNAL MEDICINE
Payer: COMMERCIAL

## 2018-03-22 ENCOUNTER — ALLIED HEALTH/NURSE VISIT (OUTPATIENT)
Dept: ONCOLOGY | Facility: CLINIC | Age: 58
End: 2018-03-22
Attending: INTERNAL MEDICINE
Payer: COMMERCIAL

## 2018-03-22 VITALS
SYSTOLIC BLOOD PRESSURE: 135 MMHG | OXYGEN SATURATION: 96 % | WEIGHT: 315 LBS | HEIGHT: 78 IN | RESPIRATION RATE: 16 BRPM | TEMPERATURE: 97.9 F | HEART RATE: 59 BPM | BODY MASS INDEX: 36.45 KG/M2 | DIASTOLIC BLOOD PRESSURE: 80 MMHG

## 2018-03-22 DIAGNOSIS — C15.5 CANCER OF DISTAL THIRD OF ESOPHAGUS (H): Primary | ICD-10-CM

## 2018-03-22 DIAGNOSIS — Z00.6 EXAMINATION OF PARTICIPANT IN CLINICAL TRIAL: ICD-10-CM

## 2018-03-22 DIAGNOSIS — C15.5 CANCER OF DISTAL THIRD OF ESOPHAGUS (H): ICD-10-CM

## 2018-03-22 LAB
ALBUMIN SERPL-MCNC: 3.4 G/DL (ref 3.4–5)
ALP SERPL-CCNC: 73 U/L (ref 40–150)
ALT SERPL W P-5'-P-CCNC: 22 U/L (ref 0–70)
ANION GAP SERPL CALCULATED.3IONS-SCNC: 7 MMOL/L (ref 3–14)
AST SERPL W P-5'-P-CCNC: 24 U/L (ref 0–45)
BASOPHILS # BLD AUTO: 0 10E9/L (ref 0–0.2)
BASOPHILS NFR BLD AUTO: 0.4 %
BILIRUB SERPL-MCNC: 0.5 MG/DL (ref 0.2–1.3)
BUN SERPL-MCNC: 11 MG/DL (ref 7–30)
CALCIUM SERPL-MCNC: 8.9 MG/DL (ref 8.5–10.1)
CHLORIDE SERPL-SCNC: 111 MMOL/L (ref 94–109)
CO2 SERPL-SCNC: 25 MMOL/L (ref 20–32)
CREAT SERPL-MCNC: 0.72 MG/DL (ref 0.66–1.25)
DIFFERENTIAL METHOD BLD: ABNORMAL
EOSINOPHIL # BLD AUTO: 0 10E9/L (ref 0–0.7)
EOSINOPHIL NFR BLD AUTO: 1.1 %
ERYTHROCYTE [DISTWIDTH] IN BLOOD BY AUTOMATED COUNT: 15.3 % (ref 10–15)
GFR SERPL CREATININE-BSD FRML MDRD: >90 ML/MIN/1.7M2
GLUCOSE SERPL-MCNC: 100 MG/DL (ref 70–99)
HCT VFR BLD AUTO: 37.4 % (ref 40–53)
HGB BLD-MCNC: 11.8 G/DL (ref 13.3–17.7)
IMM GRANULOCYTES # BLD: 0 10E9/L (ref 0–0.4)
IMM GRANULOCYTES NFR BLD: 0 %
LDH SERPL L TO P-CCNC: 154 U/L (ref 85–227)
LYMPHOCYTES # BLD AUTO: 0.8 10E9/L (ref 0.8–5.3)
LYMPHOCYTES NFR BLD AUTO: 30.1 %
MAGNESIUM SERPL-MCNC: 1.8 MG/DL (ref 1.6–2.3)
MCH RBC QN AUTO: 29.9 PG (ref 26.5–33)
MCHC RBC AUTO-ENTMCNC: 31.6 G/DL (ref 31.5–36.5)
MCV RBC AUTO: 95 FL (ref 78–100)
MONOCYTES # BLD AUTO: 0.2 10E9/L (ref 0–1.3)
MONOCYTES NFR BLD AUTO: 6.5 %
NEUTROPHILS # BLD AUTO: 1.7 10E9/L (ref 1.6–8.3)
NEUTROPHILS NFR BLD AUTO: 61.9 %
NRBC # BLD AUTO: 0 10*3/UL
NRBC BLD AUTO-RTO: 0 /100
PHOSPHATE SERPL-MCNC: 2.6 MG/DL (ref 2.5–4.5)
PLATELET # BLD AUTO: 77 10E9/L (ref 150–450)
POTASSIUM SERPL-SCNC: 3.6 MMOL/L (ref 3.4–5.3)
PROT SERPL-MCNC: 7.1 G/DL (ref 6.8–8.8)
RBC # BLD AUTO: 3.94 10E12/L (ref 4.4–5.9)
SODIUM SERPL-SCNC: 142 MMOL/L (ref 133–144)
WBC # BLD AUTO: 2.8 10E9/L (ref 4–11)

## 2018-03-22 PROCEDURE — 80053 COMPREHEN METABOLIC PANEL: CPT | Performed by: INTERNAL MEDICINE

## 2018-03-22 PROCEDURE — 83615 LACTATE (LD) (LDH) ENZYME: CPT | Performed by: INTERNAL MEDICINE

## 2018-03-22 PROCEDURE — 83735 ASSAY OF MAGNESIUM: CPT | Performed by: INTERNAL MEDICINE

## 2018-03-22 PROCEDURE — 25000128 H RX IP 250 OP 636: Mod: ZF | Performed by: INTERNAL MEDICINE

## 2018-03-22 PROCEDURE — G0463 HOSPITAL OUTPT CLINIC VISIT: HCPCS | Mod: ZF,25

## 2018-03-22 PROCEDURE — 99215 OFFICE O/P EST HI 40 MIN: CPT | Mod: ZP | Performed by: INTERNAL MEDICINE

## 2018-03-22 PROCEDURE — 84100 ASSAY OF PHOSPHORUS: CPT | Performed by: INTERNAL MEDICINE

## 2018-03-22 PROCEDURE — 93005 ELECTROCARDIOGRAM TRACING: CPT

## 2018-03-22 PROCEDURE — 85025 COMPLETE CBC W/AUTO DIFF WBC: CPT | Performed by: INTERNAL MEDICINE

## 2018-03-22 PROCEDURE — 93010 ELECTROCARDIOGRAM REPORT: CPT | Performed by: INTERNAL MEDICINE

## 2018-03-22 RX ORDER — HEPARIN SODIUM (PORCINE) LOCK FLUSH IV SOLN 100 UNIT/ML 100 UNIT/ML
5 SOLUTION INTRAVENOUS ONCE
Status: COMPLETED | OUTPATIENT
Start: 2018-03-22 | End: 2018-03-22

## 2018-03-22 RX ADMIN — SODIUM CHLORIDE, PRESERVATIVE FREE 5 ML: 5 INJECTION INTRAVENOUS at 10:57

## 2018-03-22 ASSESSMENT — PAIN SCALES - GENERAL: PAINLEVEL: NO PAIN (0)

## 2018-03-22 NOTE — NURSING NOTE
Performed EKG for research on pt in clinic for research. Mimi Thorne, [facility]

## 2018-03-22 NOTE — LETTER
"3/22/2018       RE: Reuben Padilla  [address] [place] [id number]     Dear Colleague,    Thank you for referring your patient, Reuben Padilla, to the [facility]. Please see a copy of my visit note below.    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 His tumor is positive for cytokeratin 20, negative for P63 and CK7, and HER2 is negative. He started on FOLFOX (5FU/oxaliplatin) on 5/15/2017. He had a delay in treatment from 9/5-10/2/17 due to work related injury.    He had an excellent response by imaging throughout the summer 2017.    He a mixed response by imaging in late fall 2017.    In January 2018, he was switched to taxol and cyramza.  He comes in today for follow up.      INTERVAL HISTORY: Levi comes in today for followup.  Levi was last seen by me approximately 2 weeks ago.  At that time, we discussed enrollment in the [facility] MATCH clinical trial.  He comes in today to begin crizotinib.       He says overall that he is feeling reasonably well.  He continues to have some discomfort in his right shoulder from a rotator cuff tear.  He is eating and drinking well.  He has no issues with constipation.  He has occasional diarrhea for which he will take Imodium or Lomotil.  His energy levels are reasonable.  He continues to be active, although it has been hard to exercise given the weather.      During our visit, he found out his mother has a brain tumor.  This was very distressing for Levi and his sister.       His 10-point review of systems is otherwise negative.             PHYSICAL EXAMINATION  /80 (BP Location: Right arm, Cuff Size: Adult Large)  Pulse 59  Temp 97.9  F (36.6  C) (Oral)  Resp 16  Ht 1.981 m (6' 6\")  Wt (!) 164.4 kg (362 lb 6.4 oz)  SpO2 96%  BMI 41.88 " kg/m2 /90  Wt Readings from Last 4 Encounters:   03/22/18 (!) 164.4 kg (362 lb 6.4 oz)   03/13/18 (!) 163 kg (359 lb 4.8 oz)   03/06/18 (!) 162.3 kg (357 lb 12.8 oz)   02/19/18 (!) 161.1 kg (355 lb 3.2 oz)     Constitutional: Alert, oriented male in no visible distress.  Eyes: PERRL. Anicteric sclerae.  ENT/Mouth: OM moist and pink without lesions or thrush.  CV: RRR  Resp: CTAB throughout  Abdomen: Soft, non-tender, non-distended. Obese. Bowel sounds present. Unable to palpate liver or spleen.   Extremities: No lower extremity edema   Skin: Warm, dry. mild venous stasis changes on LE bilat.  No spine tenderness  Lymph: No cervical or supraclavicular lymphadenopathy appreciated.   Neuro: CN II-XII grossly intact.  Absent reflexed at knees bilaterally    ECOG PS: 1    LABS:     Lab Results   Component Value Date    WBC 2.8 03/22/2018     Lab Results   Component Value Date    RBC 3.94 03/22/2018     Lab Results   Component Value Date    HGB 11.8 03/22/2018     Lab Results   Component Value Date    HCT 37.4 03/22/2018     No components found for: MCT  Lab Results   Component Value Date    MCV 95 03/22/2018     Lab Results   Component Value Date    MCH 29.9 03/22/2018     Lab Results   Component Value Date    MCHC 31.6 03/22/2018     Lab Results   Component Value Date    RDW 15.3 03/22/2018     Lab Results   Component Value Date    PLT 77 03/22/2018       Recent Labs   Lab Test  03/22/18   1102  03/13/18   1210   NA  142  140   POTASSIUM  3.6  3.8   CHLORIDE  111*  110*   CO2  25  24   ANIONGAP  7  6   GLC  100*  93   BUN  11  10   CR  0.72  0.78   NIDHI  8.9  8.6     Liver Function Studies -   Recent Labs   Lab Test  03/22/18   1102   PROTTOTAL  7.1   ALBUMIN  3.4   BILITOTAL  0.5   ALKPHOS  73   AST  24   ALT  22       CT C/A/P - mixed response with slight progression in liver and gastric mass.    [facility] One testing - MET amplification and KRAS amplification; results scanned in computer    IMPRESSION/PLAN:  1.  Metastatic esophogeal cancer. Levi has been on FOLFOX.  He subsequently is on Taxol and Cyramza.  I reviewed his CT scan which reveals a mixed response with slight progression in his liver as well as with his gastric mass.      We also reviewed his Foundation One testing which reveals amplification of KRAS mutation as well as MET amplification.  The MET amplification makes him eligible for the crizotinib arm of the [facility] MATCH clinical trial.  We reviewed the [facility] MATCH clinical trial for which he is eligible.  Given his mixed response as well as his neuropathy, I favor discontinuing this therapy and having him screened and consented for this study. We discussed the side effects of chemotherapy including myelosuppression, nausea/vomiting/diarrhea/constipation, hair loss, neuropathy, fertility complications, dehydration, cardiomyopathy, etc.  The patient will be receiving chemotherapy teaching through my nurse coordinator with handouts describing all of the side effects again.  Consent for chemotherapy was obtained.  As a result, we will hold off on any treatment today.      He will return in 4 weeks while on study, with imaging in 8 weeks.     He has a history of pulmonary embolism.  He is on Lovenox twice daily.  He has progressed through Xarelto in the past.       He does have baseline neuropathy.  This is slightly worse on Taxol and is now improving off of therapy.  He is using oxycodone sparingly for his neuropathic pain.       He functionally is doing well and coping reasonably well.  He is here with his sister today.      Kadi Dee MD

## 2018-03-22 NOTE — MR AVS SNAPSHOT
After Visit Summary   3/22/2018    Reuben Padilla    MRN: [id number]           Patient Information     Date Of Birth          1960        Visit Information        Provider Department      3/22/2018 11:20 AM Nurse, [facility]        Today's Diagnoses     Cancer of distal third of esophagus (H)        Examination of participant in clinical trial           Follow-ups after your visit        Your next 10 appointments already scheduled     Mar 22, 2018 11:30 AM CDT   (Arrive by 11:15 AM)   Return Visit with Kadi Dee MD   formerly [facility] ([facility])    [address] [place] [address]  [address]  [facility] [address] [phone]            Mar 22, 2018 12:30 PM CDT   RESEARCH with  Oncology Research Coordinator   formerly [facility] ([facility])    [address] [place] [address]  [address]  [facility] [address] [phone]              Who to contact     If you have questions or need follow up information about today's clinic visit or your schedule please contact [facility] directly at [phone].  Normal or non-critical lab and imaging results will be communicated to you by Vinjahart, letter or phone within 4 business days after the clinic has received the results. If you do not hear from us within 7 days, please contact the clinic through Vinjahart or phone. If you have a critical or abnormal lab result, we will notify you by phone as soon as possible.  Submit refill requests through Talasim or call your pharmacy and they will forward the refill request to us. Please allow 3 business days for your refill to be completed.          Additional Information About Your Visit        MyChart Information     Talasim gives you secure access to your electronic health record. If you see a primary care provider, you can also send messages to your care team and make appointments. If  you have questions, please call your primary care clinic.  If you do not have a primary care provider, please call [phone] and they will assist you.        Care EveryWhere ID     This is your Care EveryWhere ID. This could be used by other organizations to access your [place] medical records  [id number]         Blood Pressure from Last 3 Encounters:   03/13/18 133/85   03/06/18 (!) 136/98   02/19/18 (!) 137/91    Weight from Last 3 Encounters:   03/13/18 (!) 163 kg (359 lb 4.8 oz)   03/06/18 (!) 162.3 kg (357 lb 12.8 oz)   02/19/18 (!) 161.1 kg (355 lb 3.2 oz)              We Performed the Following     EKG 12-LEAD CLINIC READ        Primary Care Provider Fax #    Physician No Ref-Primary [phone]       No address on file        Equal Access to Services     [facility] ServicesMANUELA : Hadii antonio navarroo Germán, waaxda luqadaha, qaybta kaalmada adelaurynyajuancarlos, geraldo [place] . So [facility] [phone].    ATENCIÓN: Si habla español, tiene a alamo disposición servicios gratuitos de asistencia lingüística. Llame al [phone].    We comply with applicable federal civil rights laws and [place] laws. We do not discriminate on the basis of race, color, national origin, age, disability, sex, sexual orientation, or gender identity.            Thank you!     Thank you for choosing [facility]  for your care. Our goal is always to provide you with excellent care. Hearing back from our patients is one way we can continue to improve our services. Please take a few minutes to complete the written survey that you may receive in the mail after your visit with us. Thank you!             Your Updated Medication List - Protect others around you: Learn how to safely use, store and throw away your medicines at www.disposemymeds.org.          This list is accurate as of 3/22/18 11:21 AM.  Always use your most recent med list.                   Brand Name Dispense Instructions for use Diagnosis     diphenoxylate-atropine 2.5-0.025 MG per tablet    LOMOTIL    40 tablet    Take 1 tablet by mouth 4 times daily as needed for diarrhea    Diarrhea, unspecified type       enoxaparin 80 MG/0.8ML injection    LOVENOX    60 Syringe    Inject 1.3 mLs (130 mg) Subcutaneous 2 times daily    Cancer of distal third of esophagus (H)       fish oil-omega-3 fatty acids 1000 MG capsule      Take 2 g by mouth daily        IBUPROFEN PO      Take 600 mg by mouth every 4 hours as needed for moderate pain        lidocaine-prilocaine cream    EMLA    30 g    Apply topically as needed for moderate pain    Cancer of distal third of esophagus (H)       LORazepam 0.5 MG tablet    ATIVAN    30 tablet    Take 1 tablet (0.5 mg) by mouth every 4 hours as needed (Anxiety, Nausea/Vomiting or Sleep)    Cancer of distal third of esophagus (H)       Multi-vitamin Tabs tablet      Take 1 tablet by mouth daily        ondansetron 8 MG tablet    ZOFRAN    30 tablet    Take 1 tablet (8 mg) by mouth every 8 hours as needed (Nausea/Vomiting)    Cancer of distal third of esophagus (H)       prochlorperazine 10 MG tablet    COMPAZINE    30 tablet    Take 1 tablet (10 mg) by mouth every 6 hours as needed (Nausea/Vomiting)    Cancer of distal third of esophagus (H)       timolol 0.5 % ophthalmic solution    TIMOPTIC     Place into both eyes daily    Cancer of distal third of esophagus (H)       zolpidem 10 MG tablet    AMBIEN    60 tablet    Take 1 tablet (10 mg) by mouth nightly as needed    Metastasis from gastric cancer (H)

## 2018-03-22 NOTE — PROGRESS NOTES
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 His tumor is positive for cytokeratin 20, negative for P63 and CK7, and HER2 is negative. He started on FOLFOX (5FU/oxaliplatin) on 5/15/2017. He had a delay in treatment from 9/5-10/2/17 due to work related injury.    He had an excellent response by imaging throughout the summer 2017.    He a mixed response by imaging in late fall 2017.    In January 2018, he was switched to taxol and cyramza.  He comes in today for follow up.      INTERVAL HISTORY: Levi comes in today for followup.  Levi was last seen by me approximately 2 weeks ago.  At that time, we discussed enrollment in the [facility] MATCH clinical trial.  He comes in today to begin crizotinib.       He says overall that he is feeling reasonably well.  He continues to have some discomfort in his right shoulder from a rotator cuff tear.  He is eating and drinking well.  He has no issues with constipation.  He has occasional diarrhea for which he will take Imodium or Lomotil.  His energy levels are reasonable.  He continues to be active, although it has been hard to exercise given the weather.      During our visit, he found out his mother has a brain tumor.  This was very distressing for Levi and his sister.       His 10-point review of systems is otherwise negative.             PHYSICAL EXAMINATION  /80 (BP Location: Right arm, Cuff Size: Adult Large)  Pulse 59  Temp 97.9  F (36.6  C) (Oral)  Resp 16  Ht 1.981 m (6' 6\")  Wt (!) 164.4 kg (362 lb 6.4 oz)  SpO2 96%  BMI 41.88 kg/m2 /90  Wt Readings from Last 4 Encounters:   03/22/18 (!) 164.4 kg (362 lb 6.4 oz)   03/13/18 (!) 163 kg (359 lb 4.8 oz)   03/06/18 (!) 162.3 kg (357 lb 12.8 oz)   02/19/18 (!) 161.1 kg (355 lb 3.2 oz)     Constitutional: Alert, " oriented male in no visible distress.  Eyes: PERRL. Anicteric sclerae.  ENT/Mouth: OM moist and pink without lesions or thrush.  CV: RRR  Resp: CTAB throughout  Abdomen: Soft, non-tender, non-distended. Obese. Bowel sounds present. Unable to palpate liver or spleen.   Extremities: No lower extremity edema   Skin: Warm, dry. mild venous stasis changes on LE bilat.  No spine tenderness  Lymph: No cervical or supraclavicular lymphadenopathy appreciated.   Neuro: CN II-XII grossly intact.  Absent reflexed at knees bilaterally    ECOG PS: 1    LABS:     Lab Results   Component Value Date    WBC 2.8 03/22/2018     Lab Results   Component Value Date    RBC 3.94 03/22/2018     Lab Results   Component Value Date    HGB 11.8 03/22/2018     Lab Results   Component Value Date    HCT 37.4 03/22/2018     No components found for: MCT  Lab Results   Component Value Date    MCV 95 03/22/2018     Lab Results   Component Value Date    MCH 29.9 03/22/2018     Lab Results   Component Value Date    MCHC 31.6 03/22/2018     Lab Results   Component Value Date    RDW 15.3 03/22/2018     Lab Results   Component Value Date    PLT 77 03/22/2018       Recent Labs   Lab Test  03/22/18   1102  03/13/18   1210   NA  142  140   POTASSIUM  3.6  3.8   CHLORIDE  111*  110*   CO2  25  24   ANIONGAP  7  6   GLC  100*  93   BUN  11  10   CR  0.72  0.78   NIDHI  8.9  8.6     Liver Function Studies -   Recent Labs   Lab Test  03/22/18   1102   PROTTOTAL  7.1   ALBUMIN  3.4   BILITOTAL  0.5   ALKPHOS  73   AST  24   ALT  22       CT C/A/P - mixed response with slight progression in liver and gastric mass.    [facility] One testing - MET amplification and KRAS amplification; results scanned in computer    IMPRESSION/PLAN:  1. Metastatic esophogeal cancer. Levi has been on FOLFOX.  He subsequently is on Taxol and Cyramza.  I reviewed his CT scan which reveals a mixed response with slight progression in his liver as well as with his gastric mass.      We also  reviewed his [facility] One testing which reveals amplification of KRAS mutation as well as MET amplification.  The MET amplification makes him eligible for the crizotinib arm of the [facility] MATCH clinical trial.  We reviewed the [facility] MATCH clinical trial for which he is eligible.  Given his mixed response as well as his neuropathy, I favor discontinuing this therapy and having him screened and consented for this study. We discussed the side effects of chemotherapy including myelosuppression, nausea/vomiting/diarrhea/constipation, hair loss, neuropathy, fertility complications, dehydration, cardiomyopathy, etc.  The patient will be receiving chemotherapy teaching through my nurse coordinator with handouts describing all of the side effects again.  Consent for chemotherapy was obtained.  As a result, we will hold off on any treatment today.      He will return in 4 weeks while on study, with imaging in 8 weeks.     He has a history of pulmonary embolism.  He is on Lovenox twice daily.  He has progressed through Xarelto in the past.       He does have baseline neuropathy.  This is slightly worse on Taxol and is now improving off of therapy.  He is using oxycodone sparingly for his neuropathic pain.       He functionally is doing well and coping reasonably well.  He is here with his sister today.      Kadi Dee MD

## 2018-03-22 NOTE — NURSING NOTE
"Oncology Rooming Note    March 22, 2018 11:33 AM   Reuben Padilla is a 57 year old male who presents for:    Chief Complaint   Patient presents with     Port Draw     Labs drawn from port by RN. Line flushed with saline and heparin. Vs taken and pt checked in for appts     Oncology Clinic Visit     Return: Cancer of distal third of esophagus      Initial Vitals: /80 (BP Location: Right arm, Cuff Size: Adult Large)  Pulse 59  Temp 97.9  F (36.6  C) (Oral)  Resp 16  Ht 1.981 m (6' 6\")  Wt (!) 164.4 kg (362 lb 6.4 oz)  SpO2 96%  BMI 41.88 kg/m2 Estimated body mass index is 41.88 kg/(m^2) as calculated from the following:    Height as of this encounter: 1.981 m (6' 6\").    Weight as of this encounter: 164.4 kg (362 lb 6.4 oz). Body surface area is 3.01 meters squared.  No Pain (0) Comment: Data Unavailable   No LMP for male patient.  Allergies reviewed: YES  Medications reviewed: YES    Medications: Medication refills not needed today.  Pharmacy name entered into [email or web address]:    [id number] - [place], [place] - [address] [place] [address] AT INTERSECTION 109[facility] [place], [place] - [address] [place] [address] [phone]  [id number] - [place], [place] - [address] [place] [address]    Clinical concerns: no new concerns.  Pt received flu shot elsewhere. See Immunizations   Didn't obtained disstress screening due to research nurse in exam room and MD walked into exam room early     6 minutes for nursing intake (face to face time)     Gianna Nolan CMA                "

## 2018-03-22 NOTE — NURSING NOTE
"Chief Complaint   Patient presents with     Port Draw     Labs drawn from port by RN. Line flushed with saline and heparin. Vs taken and pt checked in for appts     Port accessed with 20g 3/4\" gripper needle by RN, labs collected, line flushed with saline and heparin.  Vitals taken. Pt checked in for appointment(s).    Carley Phipps RN  "

## 2018-03-22 NOTE — MR AVS SNAPSHOT
"              After Visit Summary   3/22/2018    Reuben Padilla    MRN: [id number]           Patient Information     Date Of Birth          1960        Visit Information        Provider Department      3/22/2018 11:30 AM Kadi Dee MD [facility]        Today's Diagnoses     Cancer of distal third of esophagus (H)    -  1    Examination of participant in clinical trial           Follow-ups after your visit        Who to contact     If you have questions or need follow up information about today's clinic visit or your schedule please contact [facility] directly at [phone].  Normal or non-critical lab and imaging results will be communicated to you by MyChart, letter or phone within 4 business days after the clinic has received the results. If you do not hear from us within 7 days, please contact the clinic through [employer] or phone. If you have a critical or abnormal lab result, we will notify you by phone as soon as possible.  Submit refill requests through [employer] or call your pharmacy and they will forward the refill request to us. Please allow 3 business days for your refill to be completed.          Additional Information About Your Visit        MyChart Information     [employer] gives you secure access to your electronic health record. If you see a primary care provider, you can also send messages to your care team and make appointments. If you have questions, please call your primary care clinic.  If you do not have a primary care provider, please call [phone] and they will assist you.        Care EveryWhere ID     This is your Care EveryWhere ID. This could be used by other organizations to access your [place] medical records  [id number]        Your Vitals Were     Pulse Temperature Respirations Height Pulse Oximetry BMI (Body Mass Index)    59 97.9  F (36.6  C) (Oral) 16 1.981 m (6' 6\") 96% 41.88 kg/m2       Blood Pressure from Last 3 " Encounters:   03/22/18 135/80   03/13/18 133/85   03/06/18 (!) 136/98    Weight from Last 3 Encounters:   03/22/18 (!) 164.4 kg (362 lb 6.4 oz)   03/13/18 (!) 163 kg (359 lb 4.8 oz)   03/06/18 (!) 162.3 kg (357 lb 12.8 oz)              We Performed the Following     CBC with platelets differential     Comprehensive metabolic panel     Lactate Dehydrogenase     Magnesium     Phosphorus          Today's Medication Changes          These changes are accurate as of 3/22/18 11:59 PM.  If you have any questions, ask your nurse or doctor.               Start taking these medicines.        Dose/Directions    study - crizotinib 250 MG Caps   Commonly known as:  IDS# 5011   Used for:  Cancer of distal third of esophagus (H)   Started by:  Kadi Dee MD        Dose:  250 mg   Take 1 capsule (250 mg) by mouth every 12 hours . Take with or without food. If upset stomach (nausea) occurs take with food. Swallow capsules whole do not crush, dissolve, or open capsules.   Quantity:  56 capsule   Refills:  0            Where to get your medicines      Some of these will need a paper prescription and others can be bought over the counter.  Ask your nurse if you have questions.     Bring a paper prescription for each of these medications     study - crizotinib 250 MG Caps                Primary Care Provider Fax #    Physician No Ref-Primary [phone]       No address on file        Equal Access to Services     OLLIE SHARPE : Eliza Dunlap, waaxda luqadaha, qaybta kaalmada ashleigh, geraldo cardenas. So [facility] [phone].    ATENCIÓN: Si habla español, tiene a alamo disposición servicios gratuitos de asistencia lingüística. Llame al [phone].    We comply with applicable federal civil rights laws and [place] laws. We do not discriminate on the basis of race, color, national origin, age, disability, sex, sexual orientation, or gender identity.            Thank you!     Thank you  for [facility]  for your care. Our goal is always to provide you with excellent care. Hearing back from our patients is one way we can continue to improve our services. Please take a few minutes to complete the written survey that you may receive in the mail after your visit with us. Thank you!             Your Updated Medication List - Protect others around you: Learn how to safely use, store and throw away your medicines at www.disposemymeds.org.          This list is accurate as of 3/22/18 11:59 PM.  Always use your most recent med list.                   Brand Name Dispense Instructions for use Diagnosis    diphenoxylate-atropine 2.5-0.025 MG per tablet    LOMOTIL    40 tablet    Take 1 tablet by mouth 4 times daily as needed for diarrhea    Diarrhea, unspecified type       enoxaparin 80 MG/0.8ML injection    LOVENOX    60 Syringe    Inject 1.3 mLs (130 mg) Subcutaneous 2 times daily    Cancer of distal third of esophagus (H)       fish oil-omega-3 fatty acids 1000 MG capsule      Take 2 g by mouth daily        IBUPROFEN PO      Take 600 mg by mouth every 4 hours as needed for moderate pain        lidocaine-prilocaine cream    EMLA    30 g    Apply topically as needed for moderate pain    Cancer of distal third of esophagus (H)       LORazepam 0.5 MG tablet    ATIVAN    30 tablet    Take 1 tablet (0.5 mg) by mouth every 4 hours as needed (Anxiety, Nausea/Vomiting or Sleep)    Cancer of distal third of esophagus (H)       Multi-vitamin Tabs tablet      Take 1 tablet by mouth daily        ondansetron 8 MG tablet    ZOFRAN    30 tablet    Take 1 tablet (8 mg) by mouth every 8 hours as needed (Nausea/Vomiting)    Cancer of distal third of esophagus (H)       prochlorperazine 10 MG tablet    COMPAZINE    30 tablet    Take 1 tablet (10 mg) by mouth every 6 hours as needed (Nausea/Vomiting)    Cancer of distal third of esophagus (H)       study - crizotinib 250 MG Caps    IDS# 5011    56  capsule    Take 1 capsule (250 mg) by mouth every 12 hours . Take with or without food. If upset stomach (nausea) occurs take with food. Swallow capsules whole do not crush, dissolve, or open capsules.    Cancer of distal third of esophagus (H)       timolol 0.5 % ophthalmic solution    TIMOPTIC     Place into both eyes daily    Cancer of distal third of esophagus (H)       zolpidem 10 MG tablet    AMBIEN    60 tablet    Take 1 tablet (10 mg) by mouth nightly as needed    Metastasis from gastric cancer (H)

## 2018-03-23 ENCOUNTER — TELEPHONE (OUTPATIENT)
Dept: ONCOLOGY | Facility: CLINIC | Age: 58
End: 2018-03-23

## 2018-03-23 NOTE — NURSING NOTE
RESEARCH:    Met with pt and sister who were visiting with Dr. Dee.  Overall doing well.  Eligible for MATCH trial.  Meds will be available to mail out on Tuesday per the study sponsor.    After his visit with MD, I reviewed the drug information for Crizotinib with instructions to take 1 tab by mouth every 12 hours; do not chew, break or crush the tab.  May take with or without food.  Also reviewed how to fill out the diary.  If the drug arrives during the day, he should begin dosing that evening.  Pt was given generic crizotinib information.    We will call the patient when the drug arrives so he'll know when expect it.  Pt was reminded to call/email me when it arrives so we can close the loop.    Kate Ward RN  Clinical Research Coordinator

## 2018-03-23 NOTE — TELEPHONE ENCOUNTER
RESEARCH:    Phone call from [facility] that the patient's drug had arrived.      PC to patient to find out where he'll be on Monday to receive the drug.  He provided the address [address] [place] [address]  [id number].    Update on his mother- she's still at [facility]. Surgery scheduled for Tuesday.    Kate Ward RN  Clinical Care Coordinator      3/36/18  RESEARCH:    Patient called for and provided [email or web address] tracking # for his drug shipment.  On our check, we found it should arrive by noon today.      Talked about starting the drug this evening. He is concerned about nausea. Instructed to take with antinausea med IF he find he's nauseated.  He should also try taking it with or without food to see if that helps with the nausea.      Pt reports his mom is having surgery tomorrow at [facility].  Wished him healing thoughts.    Kate Ward RN  Clinical Research Coordinator        Patient called late AM to say that his drug had arrived and that he will plan on starting tonight to take it.    Kate Ward RN

## 2018-03-29 ENCOUNTER — CARE COORDINATION (OUTPATIENT)
Dept: ONCOLOGY | Facility: CLINIC | Age: 58
End: 2018-03-29

## 2018-03-29 NOTE — PROGRESS NOTES
Placed call to Levi to check on how he is doing.  Writer received a message via clinic scheduler that he is reporting having some nausea since starting his study drug on Tuesday.   LVM for Levi and included tips such as taking the medication with food and using compazine and zofran prn.  Encouraged him to call triage line if the nausea continues.  Encouraged him to call with any additional questions or concerns.

## 2018-03-30 ENCOUNTER — CARE COORDINATION (OUTPATIENT)
Dept: ONCOLOGY | Facility: CLINIC | Age: 58
End: 2018-03-30

## 2018-03-30 DIAGNOSIS — C15.5 CANCER OF DISTAL THIRD OF ESOPHAGUS (H): ICD-10-CM

## 2018-03-30 NOTE — PROGRESS NOTES
"Spoke with Levi to check on his nausea symptoms.  He reports the nausea has resolved, he only experienced it for one day.  He requested a refill of his Lovenox, this was sent to his local pharmacy as requested.  He just received the news that his mother's pathology showed glioblastoma. She is understandably sad at the news but states \"she has had a good run and it will be up to her and her  if she does any treatment\". Offered support and provided him the name of Dr Nieves Lambert, Neuro Onc in the event his mom would like to have a second opinion or wants to have care in the [facility].  Encouraged him to call with any questions or concerns.  "

## 2018-04-05 ENCOUNTER — CARE COORDINATION (OUTPATIENT)
Dept: ONCOLOGY | Facility: CLINIC | Age: 58
End: 2018-04-05

## 2018-04-05 NOTE — PROGRESS NOTES
"Spoke with Levi for our weekly check-in.  He reports overall feeling he is adjusting to the new treatment.  He reports \"feeling marin weird in the stomach\" a few hours after taking the chemo. Last night he had developed pain that resolved with an episode of diarrhea; feels the medication is keeping him up at night and wonders if this new drug effects the skin as his forehead and scalp line have felt warm to the touch and dry. He is using lotion on the skin and reports sometimes having a burning sensation when it is first applied. Recommended that he use lotion with out scents.  He would like to switch from lovenox injections to Xarelto again. He admits that in the past he did not take the oral medication like she should have but promises after dealing with more blood clots he will take it as prescribed. Writer agreed to update Dr Dee on his request.  He is noticing that his memory is not as good, dicussed using a journal to document symptoms and questions that he wants to ask.  Overall affect and anxiety seems to have improved with weekly check in's, Levi is becoming more himself and joking and more upbeat with each call.  Encouraged him to call with any additional questions or concerns.    "

## 2018-04-12 ENCOUNTER — TELEPHONE (OUTPATIENT)
Dept: ONCOLOGY | Facility: CLINIC | Age: 58
End: 2018-04-12

## 2018-04-12 NOTE — TELEPHONE ENCOUNTER
Reuben called to report that he has developed edema from his calves to ankles bilaterally over the past 2-3 weeks. He reports pitting edema and thinks it looks stable. He denies dyspnea and is not currently on any lasix. Says his R leg looks a little bigger than his L leg but says it has for years. Denies redness or warmth to R calf.     He thought this may be a side effect of his study drug.     Sent IB message to study FRANCHESKA Love as well as Dr. Dee and RNCC Mansi Wetzel.

## 2018-04-12 NOTE — TELEPHONE ENCOUNTER
"RESEARCH:    I followed up on a call patient made to triage.  Pt has BLE swelling and his question was whether it could be related to the chemo agent is is currently taking, crizotinib.    Patient says his skin is \"tight,\" but not warm and not painful.  We reviewed s/sx of cellulitis and DVT.  He verbalized understanding that he should seek care right away for either change.      Patient also agreed that he's been on his feet a lot more recently with taking care of his mom.  Pt encouraged to elevate his legs when sitting, laying.    He's encouraged by the minimal side effects so far of his treatment.    We reviewed his upcoming appointments.  He understands to call triage with any other adverse events.    Kate Ward RN  Clinical Research Coordinator    "

## 2018-04-13 ENCOUNTER — CARE COORDINATION (OUTPATIENT)
Dept: ONCOLOGY | Facility: CLINIC | Age: 58
End: 2018-04-13

## 2018-04-13 NOTE — PROGRESS NOTES
"Spoke with Levi for weekly check-in.  He is feeling well, overall. He continues to have edema in legs and periodic nausea with one episode of vomiting. He is using compazine as needed with good results.  Reviewed s/sx to report including redness in his legs, warmth, increase in edema, SOB or fevers.  Encouraged him to elevate feet when possible.   He states, \"this treatment is going pretty good, it could be a lot worse\".  Encouraged him to call with any additional question or concerns.    "

## 2018-04-20 ENCOUNTER — CARE COORDINATION (OUTPATIENT)
Dept: ONCOLOGY | Facility: CLINIC | Age: 58
End: 2018-04-20

## 2018-04-20 NOTE — PROGRESS NOTES
Placed a call to Levi for a weekly check-in on how he is doing.  LVM with contact information if he would like to call back to discuss anything.  Left a reminder of his appointment with Dr Dee on Tuesday 4/24.

## 2018-04-24 ENCOUNTER — APPOINTMENT (OUTPATIENT)
Dept: LAB | Facility: CLINIC | Age: 58
End: 2018-04-24
Attending: INTERNAL MEDICINE
Payer: COMMERCIAL

## 2018-04-24 ENCOUNTER — ONCOLOGY VISIT (OUTPATIENT)
Dept: ONCOLOGY | Facility: CLINIC | Age: 58
End: 2018-04-24
Attending: INTERNAL MEDICINE
Payer: COMMERCIAL

## 2018-04-24 VITALS
HEART RATE: 80 BPM | HEIGHT: 78 IN | OXYGEN SATURATION: 96 % | WEIGHT: 315 LBS | SYSTOLIC BLOOD PRESSURE: 111 MMHG | BODY MASS INDEX: 36.45 KG/M2 | RESPIRATION RATE: 16 BRPM | DIASTOLIC BLOOD PRESSURE: 75 MMHG | TEMPERATURE: 98 F

## 2018-04-24 DIAGNOSIS — C15.5 CANCER OF DISTAL THIRD OF ESOPHAGUS (H): Primary | ICD-10-CM

## 2018-04-24 DIAGNOSIS — T45.1X5A CHEMOTHERAPY-INDUCED NEUROPATHY (H): ICD-10-CM

## 2018-04-24 DIAGNOSIS — Z00.6 EXAMINATION OF PARTICIPANT IN CLINICAL TRIAL: ICD-10-CM

## 2018-04-24 DIAGNOSIS — R60.1 GENERALIZED EDEMA: ICD-10-CM

## 2018-04-24 DIAGNOSIS — G62.0 CHEMOTHERAPY-INDUCED NEUROPATHY (H): ICD-10-CM

## 2018-04-24 LAB
ALBUMIN SERPL-MCNC: 2.9 G/DL (ref 3.4–5)
ALP SERPL-CCNC: 100 U/L (ref 40–150)
ALT SERPL W P-5'-P-CCNC: 43 U/L (ref 0–70)
ANION GAP SERPL CALCULATED.3IONS-SCNC: 6 MMOL/L (ref 3–14)
AST SERPL W P-5'-P-CCNC: 36 U/L (ref 0–45)
BASOPHILS # BLD AUTO: 0 10E9/L (ref 0–0.2)
BASOPHILS NFR BLD AUTO: 0.3 %
BILIRUB SERPL-MCNC: 0.4 MG/DL (ref 0.2–1.3)
BUN SERPL-MCNC: 13 MG/DL (ref 7–30)
CALCIUM SERPL-MCNC: 8.1 MG/DL (ref 8.5–10.1)
CHLORIDE SERPL-SCNC: 113 MMOL/L (ref 94–109)
CO2 SERPL-SCNC: 25 MMOL/L (ref 20–32)
CREAT SERPL-MCNC: 0.89 MG/DL (ref 0.66–1.25)
DIFFERENTIAL METHOD BLD: ABNORMAL
EOSINOPHIL # BLD AUTO: 0 10E9/L (ref 0–0.7)
EOSINOPHIL NFR BLD AUTO: 1 %
ERYTHROCYTE [DISTWIDTH] IN BLOOD BY AUTOMATED COUNT: 14.5 % (ref 10–15)
GFR SERPL CREATININE-BSD FRML MDRD: 88 ML/MIN/1.7M2
GLUCOSE SERPL-MCNC: 99 MG/DL (ref 70–99)
HCT VFR BLD AUTO: 38.2 % (ref 40–53)
HGB BLD-MCNC: 12.6 G/DL (ref 13.3–17.7)
IMM GRANULOCYTES # BLD: 0 10E9/L (ref 0–0.4)
IMM GRANULOCYTES NFR BLD: 0.3 %
LDH SERPL L TO P-CCNC: 218 U/L (ref 85–227)
LYMPHOCYTES # BLD AUTO: 0.8 10E9/L (ref 0.8–5.3)
LYMPHOCYTES NFR BLD AUTO: 27.9 %
MAGNESIUM SERPL-MCNC: 1.9 MG/DL (ref 1.6–2.3)
MCH RBC QN AUTO: 29.2 PG (ref 26.5–33)
MCHC RBC AUTO-ENTMCNC: 33 G/DL (ref 31.5–36.5)
MCV RBC AUTO: 88 FL (ref 78–100)
MONOCYTES # BLD AUTO: 0.3 10E9/L (ref 0–1.3)
MONOCYTES NFR BLD AUTO: 8.7 %
NEUTROPHILS # BLD AUTO: 1.8 10E9/L (ref 1.6–8.3)
NEUTROPHILS NFR BLD AUTO: 61.8 %
NRBC # BLD AUTO: 0 10*3/UL
NRBC BLD AUTO-RTO: 0 /100
PHOSPHATE SERPL-MCNC: 2.7 MG/DL (ref 2.5–4.5)
PLATELET # BLD AUTO: 80 10E9/L (ref 150–450)
POTASSIUM SERPL-SCNC: 4.1 MMOL/L (ref 3.4–5.3)
PROT SERPL-MCNC: 6.4 G/DL (ref 6.8–8.8)
RBC # BLD AUTO: 4.32 10E12/L (ref 4.4–5.9)
SODIUM SERPL-SCNC: 144 MMOL/L (ref 133–144)
WBC # BLD AUTO: 2.9 10E9/L (ref 4–11)

## 2018-04-24 PROCEDURE — 25000128 H RX IP 250 OP 636: Mod: ZF | Performed by: INTERNAL MEDICINE

## 2018-04-24 PROCEDURE — G0463 HOSPITAL OUTPT CLINIC VISIT: HCPCS | Mod: ZF

## 2018-04-24 PROCEDURE — 83735 ASSAY OF MAGNESIUM: CPT | Performed by: INTERNAL MEDICINE

## 2018-04-24 PROCEDURE — 80053 COMPREHEN METABOLIC PANEL: CPT | Performed by: INTERNAL MEDICINE

## 2018-04-24 PROCEDURE — 83615 LACTATE (LD) (LDH) ENZYME: CPT | Performed by: INTERNAL MEDICINE

## 2018-04-24 PROCEDURE — 84100 ASSAY OF PHOSPHORUS: CPT | Performed by: INTERNAL MEDICINE

## 2018-04-24 PROCEDURE — 99214 OFFICE O/P EST MOD 30 MIN: CPT | Mod: ZP | Performed by: INTERNAL MEDICINE

## 2018-04-24 PROCEDURE — 85025 COMPLETE CBC W/AUTO DIFF WBC: CPT | Performed by: INTERNAL MEDICINE

## 2018-04-24 RX ORDER — FUROSEMIDE 20 MG
20 TABLET ORAL DAILY
Qty: 30 TABLET | Refills: 3 | Status: SHIPPED | OUTPATIENT
Start: 2018-04-24 | End: 2018-10-09

## 2018-04-24 RX ORDER — GABAPENTIN 300 MG/1
CAPSULE ORAL
Qty: 90 CAPSULE | Refills: 1 | Status: SHIPPED | OUTPATIENT
Start: 2018-04-24 | End: 2018-05-22

## 2018-04-24 RX ORDER — HEPARIN SODIUM (PORCINE) LOCK FLUSH IV SOLN 100 UNIT/ML 100 UNIT/ML
5 SOLUTION INTRAVENOUS
Status: DISCONTINUED | OUTPATIENT
Start: 2018-04-24 | End: 2018-04-27 | Stop reason: HOSPADM

## 2018-04-24 RX ADMIN — HEPARIN SODIUM (PORCINE) LOCK FLUSH IV SOLN 100 UNIT/ML 5 ML: 100 SOLUTION at 11:57

## 2018-04-24 ASSESSMENT — PAIN SCALES - GENERAL: PAINLEVEL: NO PAIN (0)

## 2018-04-24 NOTE — LETTER
"4/24/2018       RE: Reuben Padilla  [address] [place] [id number]     Dear Colleague,    Thank you for referring your patient, Reuben Padilla, to the [facility]. Please see a copy of my visit note below.    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 His tumor is positive for cytokeratin 20, negative for P63 and CK7, and HER2 is negative. He started on FOLFOX (5FU/oxaliplatin) on 5/15/2017. He had a delay in treatment from 9/5-10/2/17 due to work related injury.    He had an excellent response by imaging throughout the summer 2017.    He a mixed response by imaging in late fall 2017.    In January 2018, he was switched to taxol and cyramza.     In March 2018, he was started on the [facility] Match Clinical Trial with crizotinib.      INTERVAL HISTORY: Levi comes in today for followup.  He has been on crizotinib for one month.       He says overall that he is feeling reasonably well.  He continues to have some discomfort in his right shoulder from a rotator cuff tear.  He is eating and drinking well.  He has no issues with constipation.  He has occasional diarrhea for which he will take Imodium or Lomotil.  His energy levels are reasonable.  He continues to be active.  He has noticed edema in his lower extremities.         His 10-point review of systems is otherwise negative.     PHYSICAL EXAMINATION  /75  Pulse 80  Temp 98  F (36.7  C) (Oral)  Resp 16  Ht 1.981 m (6' 5.99\")  Wt (!) 162.7 kg (358 lb 11 oz)  SpO2 96%  BMI 41.46 kg/m2 /90  Wt Readings from Last 4 Encounters:   04/24/18 (!) 162.7 kg (358 lb 11 oz)   03/22/18 (!) 164.4 kg (362 lb 6.4 oz)   03/13/18 (!) 163 kg (359 lb 4.8 oz)   03/06/18 (!) 162.3 kg (357 lb 12.8 oz)     Constitutional: Alert, oriented male in no " visible distress.  Eyes: PERRL. Anicteric sclerae.  ENT/Mouth: OM moist and pink without lesions or thrush.  CV: RRR  Resp: CTAB throughout  Abdomen: Soft, non-tender, non-distended. Obese. Bowel sounds present. Unable to palpate liver or spleen.   Extremities: No lower extremity edema   Skin: Warm, dry. 1+ edema to knees bilaterally  Lymph: No cervical or supraclavicular lymphadenopathy appreciated.   Neuro: CN II-XII grossly intact.  Absent reflexed at knees bilaterally    ECOG PS: 1    LABS:     Lab Results   Component Value Date    WBC 2.9 04/24/2018     Lab Results   Component Value Date    RBC 4.32 04/24/2018     Lab Results   Component Value Date    HGB 12.6 04/24/2018     Lab Results   Component Value Date    HCT 38.2 04/24/2018     No components found for: MCT  Lab Results   Component Value Date    MCV 88 04/24/2018     Lab Results   Component Value Date    MCH 29.2 04/24/2018     Lab Results   Component Value Date    MCHC 33.0 04/24/2018     Lab Results   Component Value Date    RDW 14.5 04/24/2018     Lab Results   Component Value Date    PLT 80 04/24/2018       Recent Labs   Lab Test  04/24/18   1209  03/22/18   1102   NA  144  142   POTASSIUM  4.1  3.6   CHLORIDE  113*  111*   CO2  25  25   ANIONGAP  6  7   GLC  99  100*   BUN  13  11   CR  0.89  0.72   NIDHI  8.1*  8.9     Liver Function Studies -   Recent Labs   Lab Test  04/24/18   1209   PROTTOTAL  6.4*   ALBUMIN  2.9*   BILITOTAL  0.4   ALKPHOS  100   AST  36   ALT  43         [facility] testing - MET amplification and KRAS amplification; results scanned in computer    IMPRESSION/PLAN:  1. Metastatic esophogeal cancer. Levi has been on FOLFOX.  He subsequently is on Taxol and Cyramza.  He is now on crizotinib via the [facility] Match trial.   The MET amplification makes him eligible for the crizotinib arm of the [facility] MATCH clinical trial.      He is tolerating therapy well with mild edema, and diarrhea.      Due for restaging in 4 weeks.    2. Diarrhea  - he is alternating imodium and lomotil.    3. Edema - we discussed a trial of lasix; I anticipate this is related to crizotinib.       4. He has a history of pulmonary embolism.  He is on Lovenox twice daily.  He has progressed through Xarelto in the past.       5 He does have baseline neuropathy.  Gabapentin.     He functionally is doing well and coping reasonably well.  He is here with his sister today.      Kadi Dee MD

## 2018-04-24 NOTE — NURSING NOTE
"Oncology Rooming Note    April 24, 2018 12:18 PM   Reuben Padilla is a 57 year old male who presents for:    Chief Complaint   Patient presents with     Port Draw     Labs drawn by RN from Right Chest Port-a-Cath. Line flushed with Saline and Heparin.      Oncology Clinic Visit     Return: esophageal ca     Initial Vitals: /75  Pulse 80  Temp 98  F (36.7  C) (Oral)  Resp 16  Ht 1.981 m (6' 5.99\")  Wt (!) 162.7 kg (358 lb 11 oz)  SpO2 96%  BMI 41.46 kg/m2 Estimated body mass index is 41.46 kg/(m^2) as calculated from the following:    Height as of this encounter: 1.981 m (6' 5.99\").    Weight as of this encounter: 162.7 kg (358 lb 11 oz). Body surface area is 2.99 meters squared.  No Pain (0) Comment: Data Unavailable   No LMP for male patient.  Allergies reviewed: YES  Medications reviewed: YES    Medications: Medication refills not needed today.  Pharmacy name entered into [employer]:    [id number] - [place] - [address] [place] [address] AT INTERSECTION [address] [place] [address] PHARMACY [place], [place] - [address] [place] [address] [phone]  [id number] - [place], [place] - [address] [facility]    Clinical concerns: no new concerns.  Pt received flu shot elsewhere. See Immunizations     6 minutes for nursing intake (face to face time)     Gianna Nolan CMA                "

## 2018-04-24 NOTE — NURSING NOTE
Chief Complaint   Patient presents with     Port Draw     Labs drawn by RN from Right Chest Port-a-Cath. Line flushed with Saline and Heparin.      Lexy Paris RN

## 2018-04-24 NOTE — NURSING NOTE
RESEARCH:    Met with patient and his sister during the safety visit with Dr. Dee.  Overall, patient reports doing well and is tolerating the side-effects of crizotinib.  He had some nausea when he first started; managed by taking med with food.  He has reported having diarrhea which is well managed by lomotil    Patient reports that on-going chemo-related neuropathy is more bothersome. Agreed to try neurontin. Also having BLE edema, probably due to crizotinib.  He has used nadeem hose in the past.  He is willing to try low dose lasix to see if that works.  If it doesn't, he will not continue on with the med.  FRANCHESKA NazarioCC will follow up with him and assess results.    He also said his vision has changed:  He was nearsighted; says he is now farsighted.  Had some dry sensation and slight blur in his R eye; used eye drops, which helped.  No longer a problem.    His labs look good and he is ready to begin C1 of the MATCH C-1 subprotocol.  He returned a pill bottle with 2 caps of crizotinib and his completed drug diary; was given a new bottle and diary in return.

## 2018-04-24 NOTE — MR AVS SNAPSHOT
After Visit Summary   4/24/2018    Reuben Padilla    MRN: [id number]           Patient Information     Date Of Birth          1960        Visit Information        Provider Department      4/24/2018 12:00 PM Kadi Dee MD [facility]        Today's Diagnoses     Cancer of distal third of esophagus (H)    -  1    Examination of participant in clinical trial        Chemotherapy-induced neuropathy (H)        Generalized edema           Follow-ups after your visit        Your next 10 appointments already scheduled     May 22, 2018 11:20 AM CDT   (Arrive by 11:05 AM)   CT CHEST/ABDOMEN/PELVIS W CONTRAST with UCCT2   [facility] CT ([facility])    [address]  [facility] [address] [phone]           Please bring any scans or X-rays taken at other hospitals, if similar tests were done. Also bring a list of your medicines, including vitamins, minerals and over-the-counter drugs. It is safest to leave personal items at home.  Be sure to tell your doctor:   If you have any allergies.   If there s any chance you are pregnant.   If you are breastfeeding.  How to prepare:   Do not eat or drink for 2 hours before your exam. If you need to take medicine, you may take it with small sips of water. (We may ask you to take liquid medicine as well.)   Please wear loose clothing, such as a sweat suit or jogging clothes. Avoid snaps, zippers and other metal. We may ask you to undress and put on a hospital gown.  Please arrive 30 minutes early for your CT. Once in the department you might be asked to drink water 15-20 minutes prior to your exam.  If indicated you may be asked to drink an oral contrast in advance of your CT.  If this is the case, the imaging team will let you know or be in contact with you prior to your appointment  Patients over 70 or patients with diabetes or kidney problems:   If you haven t had a blood  test (creatinine test) within the last 30 days, the Cardiologist/Radiologist may require you to get this test prior to your exam.  If you have diabetes:   Continue to take your metformin medication on the day of your exam  If you have any questions, please call the Imaging Department where you will have your exam.            May 22, 2018  2:00 PM CDT   [employer] Lab Draw with  [employer] LAB DRAW   [facility] Lab Draw ([facility])    [address] [place] [address]  [address]  [facility] [address] [phone]            May 22, 2018  2:30 PM CDT   (Arrive by 2:15 PM)   Return Visit with Kadi Dee MD   [facility] ([facility])    [address] [place] [address]  [address]  [facility] [address] [phone]              Who to contact     If you have questions or need follow up information about today's clinic visit or your schedule please contact [facility] directly at [phone].  Normal or non-critical lab and imaging results will be communicated to you by MyChart, letter or phone within 4 business days after the clinic has received the results. If you do not hear from us within 7 days, please contact the clinic through [employer] or phone. If you have a critical or abnormal lab result, we will notify you by phone as soon as possible.  Submit refill requests through [employer] or call your pharmacy and they will forward the refill request to us. Please allow 3 business days for your refill to be completed.          Additional Information About Your Visit        [employer] Information     [employer] gives you secure access to your electronic health record. If you see a primary care provider, you can also send messages to your care team and make appointments. If you have questions, please call your primary care clinic.  If you do not have a primary care provider, please call [phone] and they will assist you.        Care  "EveryWhere ID     This is your Care EveryWhere ID. This could be used by other organizations to access your [facility] medical records  [id number]        Your Vitals Were     Pulse Temperature Respirations Height Pulse Oximetry BMI (Body Mass Index)    80 98  F (36.7  C) (Oral) 16 1.981 m (6' 5.99\") 96% 41.46 kg/m2       Blood Pressure from Last 3 Encounters:   04/24/18 111/75   03/22/18 135/80   03/13/18 133/85    Weight from Last 3 Encounters:   04/24/18 (!) 162.7 kg (358 lb 11 oz)   03/22/18 (!) 164.4 kg (362 lb 6.4 oz)   03/13/18 (!) 163 kg (359 lb 4.8 oz)              We Performed the Following     CBC with platelets differential     Comprehensive metabolic panel     Lactate Dehydrogenase     Magnesium     Phosphorus          Today's Medication Changes          These changes are accurate as of 4/24/18 11:59 PM.  If you have any questions, ask your nurse or doctor.               Start taking these medicines.        Dose/Directions    furosemide 20 MG tablet   Commonly known as:  LASIX   Used for:  Cancer of distal third of esophagus (H), Generalized edema   Started by:  Kadi Dee MD        Dose:  20 mg   Take 1 tablet (20 mg) by mouth daily   Quantity:  30 tablet   Refills:  3       gabapentin 300 MG capsule   Commonly known as:  NEURONTIN   Used for:  Chemotherapy-induced neuropathy (H)   Started by:  Kadi Dee MD        Begin one tablet at bedtime, and then increase to three times daily   Quantity:  90 capsule   Refills:  1         These medicines have changed or have updated prescriptions.        Dose/Directions    * study - crizotinib 250 MG Caps   Commonly known as:  IDS# 5011   This may have changed:  Another medication with the same name was added. Make sure you understand how and when to take each.   Used for:  Cancer of distal third of esophagus (H)   Changed by:  Kadi Dee MD        Dose:  250 mg   Take 1 capsule (250 mg) by mouth every 12 hours . Take with or without " food. If upset stomach (nausea) occurs take with food. Swallow capsules whole do not crush, dissolve, or open capsules.   Quantity:  56 capsule   Refills:  0       * study - crizotinib 250 MG Caps   Commonly known as:  IDS# 5011   This may have changed:  You were already taking a medication with the same name, and this prescription was added. Make sure you understand how and when to take each.   Used for:  Cancer of distal third of esophagus (H)   Changed by:  Kadi Dee MD        Dose:  250 mg   Take 1 capsule (250 mg) by mouth every 12 hours . Take with or without food. If upset stomach (nausea) occurs take with food. Swallow capsules whole do not crush, dissolve, or open capsules.   Quantity:  56 capsule   Refills:  0       * Notice:  This list has 2 medication(s) that are the same as other medications prescribed for you. Read the directions carefully, and ask your doctor or other care provider to review them with you.         Where to get your medicines      These medications were sent to [address] [place] [address] [address] [address]    Hours:  TRANSPLANT PHONE NUMBER [phone] Phone:  [phone]     furosemide 20 MG tablet    gabapentin 300 MG capsule         Some of these will need a paper prescription and others can be bought over the counter.  Ask your nurse if you have questions.     Bring a paper prescription for each of these medications     study - crizotinib 250 MG Caps                Primary Care Provider Fax #    Physician No Ref-Primary [phone]       No address on file        Equal Access to Services     OLLIE SHRAPE : Eliza canseco Sonigel, waaxda luqadaha, qaybta kaalmada ashleigh, geraldo cardenas. So [facility] [phone].    ATENCIÓN: Si habla español, tiene a alamo disposición servicios gratuitos de asistencia lingüística. Llame al [phone].    We comply with  applicable federal civil rights laws and [place] laws. We do not discriminate on the basis of race, color, national origin, age, disability, sex, sexual orientation, or gender identity.            Thank you!     Thank you for choosing [facility]  for your care. Our goal is always to provide you with excellent care. Hearing back from our patients is one way we can continue to improve our services. Please take a few minutes to complete the written survey that you may receive in the mail after your visit with us. Thank you!             Your Updated Medication List - Protect others around you: Learn how to safely use, store and throw away your medicines at www.disposemymeds.org.          This list is accurate as of 4/24/18 11:59 PM.  Always use your most recent med list.                   Brand Name Dispense Instructions for use Diagnosis    diphenoxylate-atropine 2.5-0.025 MG per tablet    LOMOTIL    40 tablet    Take 1 tablet by mouth 4 times daily as needed for diarrhea    Diarrhea, unspecified type       enoxaparin 80 MG/0.8ML injection    LOVENOX    60 Syringe    Inject 1.3 mLs (130 mg) Subcutaneous 2 times daily    Cancer of distal third of esophagus (H)       fish oil-omega-3 fatty acids 1000 MG capsule      Take 2 g by mouth daily        furosemide 20 MG tablet    LASIX    30 tablet    Take 1 tablet (20 mg) by mouth daily    Cancer of distal third of esophagus (H), Generalized edema       gabapentin 300 MG capsule    NEURONTIN    90 capsule    Begin one tablet at bedtime, and then increase to three times daily    Chemotherapy-induced neuropathy (H)       IBUPROFEN PO      Take 600 mg by mouth every 4 hours as needed for moderate pain        lidocaine-prilocaine cream    EMLA    30 g    Apply topically as needed for moderate pain    Cancer of distal third of esophagus (H)       LORazepam 0.5 MG tablet    ATIVAN    30 tablet    Take 1 tablet (0.5 mg) by mouth every 4 hours as needed (Anxiety,  Nausea/Vomiting or Sleep)    Cancer of distal third of esophagus (H)       Multi-vitamin Tabs tablet      Take 1 tablet by mouth daily        ondansetron 8 MG tablet    ZOFRAN    30 tablet    Take 1 tablet (8 mg) by mouth every 8 hours as needed (Nausea/Vomiting)    Cancer of distal third of esophagus (H)       prochlorperazine 10 MG tablet    COMPAZINE    30 tablet    Take 1 tablet (10 mg) by mouth every 6 hours as needed (Nausea/Vomiting)    Cancer of distal third of esophagus (H)       * study - crizotinib 250 MG Caps    IDS# 5011    56 capsule    Take 1 capsule (250 mg) by mouth every 12 hours . Take with or without food. If upset stomach (nausea) occurs take with food. Swallow capsules whole do not crush, dissolve, or open capsules.    Cancer of distal third of esophagus (H)       * study - crizotinib 250 MG Caps    IDS# 5011    56 capsule    Take 1 capsule (250 mg) by mouth every 12 hours . Take with or without food. If upset stomach (nausea) occurs take with food. Swallow capsules whole do not crush, dissolve, or open capsules.    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 Read the directions carefully, and ask your doctor or other care provider to review them with you.

## 2018-04-27 NOTE — PROGRESS NOTES
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 His tumor is positive for cytokeratin 20, negative for P63 and CK7, and HER2 is negative. He started on FOLFOX (5FU/oxaliplatin) on 5/15/2017. He had a delay in treatment from 9/5-10/2/17 due to work related injury.    He had an excellent response by imaging throughout the summer 2017.    He a mixed response by imaging in late fall 2017.    In January 2018, he was switched to taxol and cyramza.     In March 2018, he was started on the [facility] Match Clinical Trial with crizotinib.      INTERVAL HISTORY: Levi comes in today for followup.  He has been on crizotinib for one month.       He says overall that he is feeling reasonably well.  He continues to have some discomfort in his right shoulder from a rotator cuff tear.  He is eating and drinking well.  He has no issues with constipation.  He has occasional diarrhea for which he will take Imodium or Lomotil.  His energy levels are reasonable.  He continues to be active.  He has noticed edema in his lower extremities.         His 10-point review of systems is otherwise negative.     PHYSICAL EXAMINATION  /75  Pulse 80  Temp 98  F (36.7  C) (Oral)  Resp 16  Ht 1.981 m (6' 5.99\")  Wt (!) 162.7 kg (358 lb 11 oz)  SpO2 96%  BMI 41.46 kg/m2 /90  Wt Readings from Last 4 Encounters:   04/24/18 (!) 162.7 kg (358 lb 11 oz)   03/22/18 (!) 164.4 kg (362 lb 6.4 oz)   03/13/18 (!) 163 kg (359 lb 4.8 oz)   03/06/18 (!) 162.3 kg (357 lb 12.8 oz)     Constitutional: Alert, oriented male in no visible distress.  Eyes: PERRL. Anicteric sclerae.  ENT/Mouth: OM moist and pink without lesions or thrush.  CV: RRR  Resp: CTAB throughout  Abdomen: Soft, non-tender, non-distended. Obese. Bowel sounds present. Unable to palpate liver or " spleen.   Extremities: No lower extremity edema   Skin: Warm, dry. 1+ edema to knees bilaterally  Lymph: No cervical or supraclavicular lymphadenopathy appreciated.   Neuro: CN II-XII grossly intact.  Absent reflexed at knees bilaterally    ECOG PS: 1    LABS:     Lab Results   Component Value Date    WBC 2.9 04/24/2018     Lab Results   Component Value Date    RBC 4.32 04/24/2018     Lab Results   Component Value Date    HGB 12.6 04/24/2018     Lab Results   Component Value Date    HCT 38.2 04/24/2018     No components found for: MCT  Lab Results   Component Value Date    MCV 88 04/24/2018     Lab Results   Component Value Date    MCH 29.2 04/24/2018     Lab Results   Component Value Date    MCHC 33.0 04/24/2018     Lab Results   Component Value Date    RDW 14.5 04/24/2018     Lab Results   Component Value Date    PLT 80 04/24/2018       Recent Labs   Lab Test  04/24/18   1209  03/22/18   1102   NA  144  142   POTASSIUM  4.1  3.6   CHLORIDE  113*  111*   CO2  25  25   ANIONGAP  6  7   GLC  99  100*   BUN  13  11   CR  0.89  0.72   NIDHI  8.1*  8.9     Liver Function Studies -   Recent Labs   Lab Test  04/24/18   1209   PROTTOTAL  6.4*   ALBUMIN  2.9*   BILITOTAL  0.4   ALKPHOS  100   AST  36   ALT  43         [facility] testing - MET amplification and KRAS amplification; results scanned in computer    IMPRESSION/PLAN:  1. Metastatic esophogeal cancer. Levi has been on FOLFOX.  He subsequently is on Taxol and Cyramza.  He is now on crizotinib via the [facility] Match trial.   The MET amplification makes him eligible for the crizotinib arm of the [facility] MATCH clinical trial.      He is tolerating therapy well with mild edema, and diarrhea.      Due for restaging in 4 weeks.    2. Diarrhea - he is alternating imodium and lomotil.    3. Edema - we discussed a trial of lasix; I anticipate this is related to crizotinib.       4. He has a history of pulmonary embolism.  He is on Lovenox twice daily.  He has progressed through  Xarelto in the past.       5 He does have baseline neuropathy.  Gabapentin.     He functionally is doing well and coping reasonably well.  He is here with his sister today.      Kadi Dee MD

## 2018-05-01 ENCOUNTER — TELEPHONE (OUTPATIENT)
Dept: ONCOLOGY | Facility: CLINIC | Age: 58
End: 2018-05-01

## 2018-05-01 NOTE — TELEPHONE ENCOUNTER
RESEARCH:    Patient called to report that for the 2nd time since starting treatment, he had an incident of burping up a cloud of powder about 15 minutes after taking the study med.     Pt states he takes the study med with a large glass of water.  No nausea, etc.    He will make a note in the diary if it happens again.    (I recall another patient having a similar situation with the same drug on a different study. )    Otherwise, pt reports he is feeling and doing well.    Kate Ward RN  Clinical Research Coordinator

## 2018-05-01 NOTE — TELEPHONE ENCOUNTER
----- Message from Kadi Dee MD sent at 5/1/2018  2:19 PM CDT -----  Karlie,    Gabapentin - start on one table daily and increase by one pill every three to four days.    Kadi  ----- Message -----     From: Lexy Wong RN     Sent: 5/1/2018  11:14 AM       To: Kadi Dee MD    He is asking how fast to increase gabapentin from one to 3 per day.      Pt was called back with instructions and voiced good understanding.

## 2018-05-03 ENCOUNTER — TELEPHONE (OUTPATIENT)
Dept: ONCOLOGY | Facility: CLINIC | Age: 58
End: 2018-05-03

## 2018-05-03 NOTE — TELEPHONE ENCOUNTER
"I spoke to patient today & he reports feeling stable at home. He is taking Lasix 20mg Qmorning as instructed since prescribed on 4/24/18, but he missed the last two days. He restarted Lasix today; he does not feel that it makes him urinate more than usual and so far there is no change to his BLE edema. Advised him to take Lasix consistently and we can reassess next week.     Patient also reports that he is titrating up his gabapentin 300mg tablets from 1 tab QHS to 2 tabs a day; on Monday he plans to move up to 3 tabs a day as prescribed. Advised him that gabapentin takes up to 2-3 weeks to possibly improve his neuropathy once therapeutic levels/dosing is achieved. Pt appreciated the call and states he is \"OK,\" and will call Triage as needed.   "

## 2018-05-08 ENCOUNTER — CARE COORDINATION (OUTPATIENT)
Dept: ONCOLOGY | Facility: CLINIC | Age: 58
End: 2018-05-08

## 2018-05-08 ENCOUNTER — TELEPHONE (OUTPATIENT)
Dept: ONCOLOGY | Facility: CLINIC | Age: 58
End: 2018-05-08

## 2018-05-08 NOTE — PROGRESS NOTES
Spoke with Levi for a weekly check in to see how he is doing.  Overall he is feeling well, he does continue to have BLE pitting edema.   He reports worsening of the edema from the knees down with the right foot worse than the left.  He is using open toes compression stockings that he is unsure if they are helping.  He describes having difficulty and pain and feeling of pressure when walking after sitting for any period of time.  He confirms using lasix, elevating his feet and using lovenox as directed.  Denies sob or chest pain.  Since starting the study drug he is no longer waking up during the night to void, he believes he is not urinating as much during the day. He acknowledges drinking at least a half gallon of fluids daily.   Updated patient that writer will be out of the office for several weeks and will be having another RNCC assigned to contact him for weekly check ins. He was appreciative of the update and open to have another RNCC contact him.  Writer to update Dr Dee and Kate Lanier research coordinator.  Encouraged him to call with any additional questions or concerns.

## 2018-05-08 NOTE — TELEPHONE ENCOUNTER
Per Patient's request,  completed and faxed [facility] referral for lodging dates 5/21 - 5/23. [facility] will contact Patient directly for confirmation of reservation.  will continue to provide support as needed.      Nieves Casper (Mart[place]), [facility], MSW   - [facility]  Phone: [phone]  Pager: [phone]  5/8/2018

## 2018-05-08 NOTE — TELEPHONE ENCOUNTER
Per Patient's request,  completed and faxed [facility] referral for lodging dates 5/21 - 5/22. [facility] will contact Patient directly for confirmation of reservation.  will continue to provide support as needed.      Nieves Casper ([place]), [facility], MSW   - [facility]  Phone: [phone]  Pager: [phone]  5/8/2018

## 2018-05-15 ENCOUNTER — CARE COORDINATION (OUTPATIENT)
Dept: ONCOLOGY | Facility: CLINIC | Age: 58
End: 2018-05-15

## 2018-05-15 DIAGNOSIS — C79.9 METASTASIS FROM GASTRIC CANCER (H): ICD-10-CM

## 2018-05-15 DIAGNOSIS — C16.9 METASTASIS FROM GASTRIC CANCER (H): ICD-10-CM

## 2018-05-15 NOTE — PROGRESS NOTES
"Called patient for his weekly check in. States overall he is doing fairly well. States he had pain \"breast bone yesterday and day before 3/10.\" Reports it is better today. Also reports edema in legs is \"getting better and seems to be getting better.\" Patient states he is in need of Ambien refill.     Ok to refill Ambien per Dr. Dee. Placed return call to patient to inform of refill. Also made sure patient had my contact info. Patient voiced understanding and states he has no further concerns at this time.        "

## 2018-05-16 RX ORDER — ZOLPIDEM TARTRATE 10 MG/1
10 TABLET ORAL
Qty: 60 TABLET | Refills: 1 | Status: SHIPPED | OUTPATIENT
Start: 2018-05-16 | End: 2018-10-12

## 2018-05-22 ENCOUNTER — RADIANT APPOINTMENT (OUTPATIENT)
Dept: CT IMAGING | Facility: CLINIC | Age: 58
End: 2018-05-22
Attending: INTERNAL MEDICINE
Payer: COMMERCIAL

## 2018-05-22 ENCOUNTER — ONCOLOGY VISIT (OUTPATIENT)
Dept: ONCOLOGY | Facility: CLINIC | Age: 58
End: 2018-05-22
Attending: INTERNAL MEDICINE
Payer: COMMERCIAL

## 2018-05-22 ENCOUNTER — APPOINTMENT (OUTPATIENT)
Dept: LAB | Facility: CLINIC | Age: 58
End: 2018-05-22
Attending: INTERNAL MEDICINE
Payer: COMMERCIAL

## 2018-05-22 ENCOUNTER — RESEARCH ENCOUNTER (OUTPATIENT)
Dept: ONCOLOGY | Facility: CLINIC | Age: 58
End: 2018-05-22

## 2018-05-22 VITALS
OXYGEN SATURATION: 95 % | WEIGHT: 315 LBS | BODY MASS INDEX: 36.45 KG/M2 | HEART RATE: 52 BPM | TEMPERATURE: 98.5 F | RESPIRATION RATE: 16 BRPM | HEIGHT: 78 IN | DIASTOLIC BLOOD PRESSURE: 82 MMHG | SYSTOLIC BLOOD PRESSURE: 120 MMHG

## 2018-05-22 DIAGNOSIS — Z00.6 EXAMINATION OF PARTICIPANT IN CLINICAL TRIAL: ICD-10-CM

## 2018-05-22 DIAGNOSIS — C15.5 CANCER OF DISTAL THIRD OF ESOPHAGUS (H): Primary | ICD-10-CM

## 2018-05-22 DIAGNOSIS — Z00.6 EXAMINATION OF PARTICIPANT IN CLINICAL TRIAL: Primary | ICD-10-CM

## 2018-05-22 DIAGNOSIS — G62.0 CHEMOTHERAPY-INDUCED NEUROPATHY (H): ICD-10-CM

## 2018-05-22 DIAGNOSIS — C15.5 CANCER OF DISTAL THIRD OF ESOPHAGUS (H): ICD-10-CM

## 2018-05-22 DIAGNOSIS — T45.1X5A CHEMOTHERAPY-INDUCED NEUROPATHY (H): ICD-10-CM

## 2018-05-22 LAB
ALBUMIN SERPL-MCNC: 2.9 G/DL (ref 3.4–5)
ALP SERPL-CCNC: 102 U/L (ref 40–150)
ALT SERPL W P-5'-P-CCNC: 38 U/L (ref 0–70)
ANION GAP SERPL CALCULATED.3IONS-SCNC: 7 MMOL/L (ref 3–14)
AST SERPL W P-5'-P-CCNC: 20 U/L (ref 0–45)
BASOPHILS # BLD AUTO: 0 10E9/L (ref 0–0.2)
BASOPHILS NFR BLD AUTO: 0.3 %
BILIRUB SERPL-MCNC: 0.4 MG/DL (ref 0.2–1.3)
BUN SERPL-MCNC: 13 MG/DL (ref 7–30)
CALCIUM SERPL-MCNC: 7.9 MG/DL (ref 8.5–10.1)
CHLORIDE SERPL-SCNC: 108 MMOL/L (ref 94–109)
CO2 SERPL-SCNC: 25 MMOL/L (ref 20–32)
CREAT SERPL-MCNC: 0.86 MG/DL (ref 0.66–1.25)
DIFFERENTIAL METHOD BLD: ABNORMAL
EOSINOPHIL # BLD AUTO: 0 10E9/L (ref 0–0.7)
EOSINOPHIL NFR BLD AUTO: 0.8 %
ERYTHROCYTE [DISTWIDTH] IN BLOOD BY AUTOMATED COUNT: 16 % (ref 10–15)
GFR SERPL CREATININE-BSD FRML MDRD: >90 ML/MIN/1.7M2
GLUCOSE SERPL-MCNC: 98 MG/DL (ref 70–99)
HCT VFR BLD AUTO: 38.6 % (ref 40–53)
HGB BLD-MCNC: 12.8 G/DL (ref 13.3–17.7)
IMM GRANULOCYTES # BLD: 0 10E9/L (ref 0–0.4)
IMM GRANULOCYTES NFR BLD: 0.3 %
LDH SERPL L TO P-CCNC: 217 U/L (ref 85–227)
LYMPHOCYTES # BLD AUTO: 1 10E9/L (ref 0.8–5.3)
LYMPHOCYTES NFR BLD AUTO: 27.4 %
MAGNESIUM SERPL-MCNC: 1.8 MG/DL (ref 1.6–2.3)
MCH RBC QN AUTO: 28.8 PG (ref 26.5–33)
MCHC RBC AUTO-ENTMCNC: 33.2 G/DL (ref 31.5–36.5)
MCV RBC AUTO: 87 FL (ref 78–100)
MONOCYTES # BLD AUTO: 0.3 10E9/L (ref 0–1.3)
MONOCYTES NFR BLD AUTO: 8 %
NEUTROPHILS # BLD AUTO: 2.3 10E9/L (ref 1.6–8.3)
NEUTROPHILS NFR BLD AUTO: 63.2 %
NRBC # BLD AUTO: 0 10*3/UL
NRBC BLD AUTO-RTO: 0 /100
PHOSPHATE SERPL-MCNC: 2.8 MG/DL (ref 2.5–4.5)
PLATELET # BLD AUTO: 138 10E9/L (ref 150–450)
POTASSIUM SERPL-SCNC: 4.3 MMOL/L (ref 3.4–5.3)
PROT SERPL-MCNC: 6.6 G/DL (ref 6.8–8.8)
RBC # BLD AUTO: 4.44 10E12/L (ref 4.4–5.9)
SODIUM SERPL-SCNC: 140 MMOL/L (ref 133–144)
WBC # BLD AUTO: 3.6 10E9/L (ref 4–11)

## 2018-05-22 PROCEDURE — 25000128 H RX IP 250 OP 636: Mod: ZF | Performed by: INTERNAL MEDICINE

## 2018-05-22 PROCEDURE — 36591 DRAW BLOOD OFF VENOUS DEVICE: CPT

## 2018-05-22 PROCEDURE — 84100 ASSAY OF PHOSPHORUS: CPT | Performed by: INTERNAL MEDICINE

## 2018-05-22 PROCEDURE — G0463 HOSPITAL OUTPT CLINIC VISIT: HCPCS | Mod: ZF

## 2018-05-22 PROCEDURE — 99214 OFFICE O/P EST MOD 30 MIN: CPT | Mod: ZP | Performed by: INTERNAL MEDICINE

## 2018-05-22 PROCEDURE — 85025 COMPLETE CBC W/AUTO DIFF WBC: CPT | Performed by: INTERNAL MEDICINE

## 2018-05-22 PROCEDURE — 83615 LACTATE (LD) (LDH) ENZYME: CPT | Performed by: INTERNAL MEDICINE

## 2018-05-22 PROCEDURE — 83735 ASSAY OF MAGNESIUM: CPT | Performed by: INTERNAL MEDICINE

## 2018-05-22 PROCEDURE — 80053 COMPREHEN METABOLIC PANEL: CPT | Performed by: INTERNAL MEDICINE

## 2018-05-22 RX ORDER — IOPAMIDOL 755 MG/ML
135 INJECTION, SOLUTION INTRAVASCULAR ONCE
Status: COMPLETED | OUTPATIENT
Start: 2018-05-22 | End: 2018-05-22

## 2018-05-22 RX ORDER — HEPARIN SODIUM (PORCINE) LOCK FLUSH IV SOLN 100 UNIT/ML 100 UNIT/ML
5 SOLUTION INTRAVENOUS
Status: COMPLETED | OUTPATIENT
Start: 2018-05-22 | End: 2018-05-22

## 2018-05-22 RX ORDER — OMEPRAZOLE 40 MG/1
40 CAPSULE, DELAYED RELEASE ORAL DAILY
Qty: 90 CAPSULE | Refills: 1 | Status: SHIPPED | OUTPATIENT
Start: 2018-05-22 | End: 2018-08-28

## 2018-05-22 RX ORDER — HEPARIN SODIUM (PORCINE) LOCK FLUSH IV SOLN 100 UNIT/ML 100 UNIT/ML
5 SOLUTION INTRAVENOUS ONCE
Status: COMPLETED | OUTPATIENT
Start: 2018-05-22 | End: 2018-05-22

## 2018-05-22 RX ORDER — GABAPENTIN 300 MG/1
CAPSULE ORAL
Qty: 180 CAPSULE | Refills: 1 | Status: SHIPPED | OUTPATIENT
Start: 2018-05-22 | End: 2018-08-28

## 2018-05-22 RX ADMIN — SODIUM CHLORIDE, PRESERVATIVE FREE 5 ML: 5 INJECTION INTRAVENOUS at 11:36

## 2018-05-22 RX ADMIN — IOPAMIDOL 135 ML: 755 INJECTION, SOLUTION INTRAVASCULAR at 11:53

## 2018-05-22 RX ADMIN — HEPARIN SODIUM (PORCINE) LOCK FLUSH IV SOLN 100 UNIT/ML 5 ML: 100 SOLUTION at 12:05

## 2018-05-22 ASSESSMENT — PAIN SCALES - GENERAL: PAINLEVEL: NO PAIN (0)

## 2018-05-22 NOTE — NURSING NOTE
"Oncology Rooming Note    May 22, 2018 2:45 PM   Reuben Padilla is a 57 year old male who presents for:    Chief Complaint   Patient presents with     Port Draw     labs drawn from port by rn.  vs taken     Oncology Clinic Visit     Esophageal, CA     Initial Vitals: /82 (BP Location: Right arm, Patient Position: Sitting, Cuff Size: Adult Large)  Pulse 52  Temp 98.5  F (36.9  C) (Oral)  Resp 16  Ht 1.981 m (6' 5.99\")  Wt (!) 165.2 kg (364 lb 4.8 oz)  SpO2 95%  BMI 42.11 kg/m2 Estimated body mass index is 42.11 kg/(m^2) as calculated from the following:    Height as of this encounter: 1.981 m (6' 5.99\").    Weight as of this encounter: 165.2 kg (364 lb 4.8 oz). Body surface area is 3.02 meters squared.  No Pain (0) Comment: Data Unavailable   No LMP for male patient.  Allergies reviewed: Yes  Medications reviewed: Yes    Medications: Medication refills not needed today.  Pharmacy name entered into [employer]:    [id number] - [place] - [address] [place] [address] [address] AT [address] & [facility] [place], [place] - [address] [facility] [phone]  [id number] - [place], [place] - [address] [facility]    Clinical concerns: no concern, refill crizotinib needed    6 minutes for nursing intake (face to face time)     Noris Chun CMA            "

## 2018-05-22 NOTE — NURSING NOTE
"Chief Complaint   Patient presents with     Port Draw     labs drawn from port by rn.  vs taken     Port accessed with 20 gauge 3/4\" gripper needle and labs drawn by rn.  Port flushed with NS and heparin.  Pt tolerated well.  VS taken.  Pt checked in for next appt.  Faye Solano RN      "

## 2018-05-22 NOTE — LETTER
5/22/2018       RE: Reuben Padilla  [address] [place] [id number]     Dear Colleague,    Thank you for referring your patient, Reuben Padilla, to the [facility]. Please see a copy of my visit note below.    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 His tumor is positive for cytokeratin 20, negative for P63 and CK7, and HER2 is negative. He started on FOLFOX (5FU/oxaliplatin) on 5/15/2017. He had a delay in treatment from 9/5-10/2/17 due to work related injury.    He had an excellent response by imaging throughout the summer 2017.    He a mixed response by imaging in late fall 2017.    In January 2018, he was switched to taxol and cyramza.     In March 2018, he was started on the [facility] Match Clinical Trial with crizotinib.      INTERVAL HISTORY: Levi comes in today for followup.  He has been on crizotinib for two months.       He says overall that he is feeling reasonably well.   He is eating and drinking well.  He has no issues with constipation.  He has occasional diarrhea for which he will take Imodium or Lomotil.  His energy levels are reasonable.  He continues to be active.  He has noticed edema in his lower extremities.  The edema comes and goes but for the most part is persistent.    His biggest complaint continues to be heaviness in his feet.  He believes the gabapentin has helped some.  He however continues to complain of this heavy pain in his LE.    He remains on lovenox.    He reports two episodes of tightening in his  Chest that lasted for a few minutes and then resolved spontaneously.  He also reports slight more thickening in his throat.     His 10-point review of systems is otherwise negative.     PHYSICAL EXAMINATION  /82 (BP Location: Right arm, Patient Position:  "Sitting, Cuff Size: Adult Large)  Pulse 52  Temp 98.5  F (36.9  C) (Oral)  Resp 16  Ht 1.981 m (6' 5.99\")  Wt (!) 165.2 kg (364 lb 4.8 oz)  SpO2 95%  BMI 42.11 kg/m2 /90  Wt Readings from Last 4 Encounters:   05/22/18 (!) 165.2 kg (364 lb 4.8 oz)   04/24/18 (!) 162.7 kg (358 lb 11 oz)   03/22/18 (!) 164.4 kg (362 lb 6.4 oz)   03/13/18 (!) 163 kg (359 lb 4.8 oz)     Constitutional: Alert, oriented male in no visible distress.  Eyes: PERRL. Anicteric sclerae.  ENT/Mouth: OM moist and pink without lesions or thrush.  CV: RRR  Resp: CTAB throughout  Abdomen: Soft, non-tender, non-distended. Obese. Bowel sounds present. Unable to palpate liver or spleen.   Extremities: No lower extremity edema   Skin: Warm, dry. 2+ edema to knees bilaterally  Lymph: No cervical or supraclavicular lymphadenopathy appreciated.   Neuro: CN II-XII grossly intact.  Absent reflexed at knees bilaterally    ECOG PS: 1    LABS:     Lab Results   Component Value Date    WBC 2.9 04/24/2018     Lab Results   Component Value Date    RBC 4.32 04/24/2018     Lab Results   Component Value Date    HGB 12.6 04/24/2018     Lab Results   Component Value Date    HCT 38.2 04/24/2018      Lab Results   Component Value Date    WBC 3.6 05/22/2018     Lab Results   Component Value Date    RBC 4.44 05/22/2018     Lab Results   Component Value Date    HGB 12.8 05/22/2018     Lab Results   Component Value Date    HCT 38.6 05/22/2018     No components found for: MCT  Lab Results   Component Value Date    MCV 87 05/22/2018     Lab Results   Component Value Date    MCH 28.8 05/22/2018     Lab Results   Component Value Date    MCHC 33.2 05/22/2018     Lab Results   Component Value Date    RDW 16.0 05/22/2018     Lab Results   Component Value Date     05/22/2018       Recent Labs   Lab Test  05/22/18   1129  04/24/18   1209   NA  140  144   POTASSIUM  4.3  4.1   CHLORIDE  108  113*   CO2  25  25   ANIONGAP  7  6   GLC  98  99   BUN  13  13   CR  0.86  " 0.89   NIDHI  7.9*  8.1*     Liver Function Studies -   Recent Labs   Lab Test  05/22/18   1129   PROTTOTAL  6.6*   ALBUMIN  2.9*   BILITOTAL  0.4   ALKPHOS  102   AST  20   ALT  38             [facility] testing - MET amplification and KRAS amplification; results scanned in computer    IMPRESSION/PLAN:  1. Metastatic esophogeal cancer. Levi has been on FOLFOX.  He subsequently is on Taxol and Cyramza.  He is now on crizotinib via the [facility] Match trial.  The MET amplification makes him eligible for the crizotinib arm of the [facility] MATCH clinical trial.      I personally reviewed his imaging both myself and then again with radiology.  He has slight progression in a mass along the celiac trunk.  The remainder of his disease is stable.  Levi expressed frustration that his disease is stable.  He wishes there was more shrinkage and that it was not just stable.      He is tolerating therapy well with mild edema, and diarrhea.      Given the stability of his disease, I'd like him to return in four weeks.  He was instructed to call if he is having more symptoms or concerns for dysphagia.    2. Epigastric discomfort - likely gerd related.  Discussed a trial of omeprazole.    3. Edema -  I anticipate this is related to crizotinib.       4. He has a history of pulmonary embolism.  He is on Lovenox twice daily.  He has progressed through Xarelto in the past.       5 He does have baseline neuropathy.  Gabapentin was increased today with a goal of getting on 600mg tid.     He functionally is doing well and coping reasonably well.  He is here with his sister and his son today.      Kadi Dee MD

## 2018-05-22 NOTE — DISCHARGE INSTRUCTIONS

## 2018-05-22 NOTE — MR AVS SNAPSHOT
After Visit Summary   5/22/2018    Reuben Padilla    MRN: [id number]           Patient Information     Date Of Birth          1960        Visit Information        Provider Department      5/22/2018 2:30 PM Kadi Dee MD [facility]        Today's Diagnoses     Examination of participant in clinical trial    -  1    Cancer of distal third of esophagus (H)        Chemotherapy-induced neuropathy (H)           Follow-ups after your visit        Future tests that were ordered for you today     Open Future Orders        Priority Expected Expires Ordered    Comprehensive metabolic panel Routine 5/22/2018 5/31/2018 5/22/2018    Lactate Dehydrogenase Routine 5/22/2018 5/31/2018 5/22/2018    Magnesium Routine 5/22/2018 5/31/2018 5/22/2018    Phosphorus Routine 5/22/2018 5/31/2018 5/22/2018    CBC with platelets differential Routine 5/22/2018 5/31/2018 5/22/2018            Who to contact     If you have questions or need follow up information about today's clinic visit or your schedule please contact [facility] directly at [phone].  Normal or non-critical lab and imaging results will be communicated to you by [email or web address], letter or phone within 4 business days after the clinic has received the results. If you do not hear from us within 7 days, please contact the clinic through britebillt or phone. If you have a critical or abnormal lab result, we will notify you by phone as soon as possible.  Submit refill requests through [employer] or call your pharmacy and they will forward the refill request to us. Please allow 3 business days for your refill to be completed.          Additional Information About Your Visit        [email or web address] Information     [employer] gives you secure access to your electronic health record. If you see a primary care provider, you can also send messages to your care team and make appointments. If you have questions, please call your primary care  "clinic.  If you do not have a primary care provider, please call [phone] and they will assist you.        Care EveryWhere ID     This is your Care EveryWhere ID. This could be used by other organizations to access your [facility] medical records  [id number]        Your Vitals Were     Pulse Temperature Respirations Height Pulse Oximetry BMI (Body Mass Index)    52 98.5  F (36.9  C) (Oral) 16 1.981 m (6' 5.99\") 95% 42.11 kg/m2       Blood Pressure from Last 3 Encounters:   05/22/18 120/82   04/24/18 111/75   03/22/18 135/80    Weight from Last 3 Encounters:   05/22/18 (!) 165.2 kg (364 lb 4.8 oz)   04/24/18 (!) 162.7 kg (358 lb 11 oz)   03/22/18 (!) 164.4 kg (362 lb 6.4 oz)              We Performed the Following     CBC with platelets differential     Comprehensive metabolic panel     Lactate Dehydrogenase     Magnesium     Phosphorus          Today's Medication Changes          These changes are accurate as of 5/22/18  5:31 PM.  If you have any questions, ask your nurse or doctor.               Start taking these medicines.        Dose/Directions    omeprazole 40 MG capsule   Commonly known as:  priLOSEC   Used for:  Cancer of distal third of esophagus (H)   Started by:  Kadi Dee MD        Dose:  40 mg   Take 1 capsule (40 mg) by mouth daily   Quantity:  90 capsule   Refills:  1         These medicines have changed or have updated prescriptions.        Dose/Directions    gabapentin 300 MG capsule   Commonly known as:  NEURONTIN   This may have changed:  additional instructions   Used for:  Chemotherapy-induced neuropathy (H)   Changed by:  Kadi Dee MD        Two tablets in the am and one tablet in the afternoon and evening   Quantity:  180 capsule   Refills:  1       * study - crizotinib 250 MG Caps   Commonly known as:  IDS# 5011   This may have changed:  Another medication with the same name was added. Make sure you understand how and when to take each.   Used for:  Cancer of distal " third of esophagus (H)   Changed by:  Kadi Dee MD        Dose:  250 mg   Take 1 capsule (250 mg) by mouth every 12 hours . Take with or without food. If upset stomach (nausea) occurs take with food. Swallow capsules whole do not crush, dissolve, or open capsules.   Quantity:  56 capsule   Refills:  0       * study - crizotinib 250 MG Caps   Commonly known as:  IDS# 5011   This may have changed:  Another medication with the same name was added. Make sure you understand how and when to take each.   Used for:  Cancer of distal third of esophagus (H)   Changed by:  Kadi Dee MD        Dose:  250 mg   Take 1 capsule (250 mg) by mouth every 12 hours . Take with or without food. If upset stomach (nausea) occurs take with food. Swallow capsules whole do not crush, dissolve, or open capsules.   Quantity:  56 capsule   Refills:  0       * study - crizotinib 250 MG Caps   Commonly known as:  IDS# 5011   This may have changed:  You were already taking a medication with the same name, and this prescription was added. Make sure you understand how and when to take each.   Used for:  Cancer of distal third of esophagus (H)   Changed by:  Kadi Dee MD        Dose:  250 mg   Take 1 capsule (250 mg) by mouth every 12 hours . Take with or without food. If upset stomach (nausea) occurs take with food. Swallow capsules whole do not crush, dissolve, or open capsules.   Quantity:  56 capsule   Refills:  0       * Notice:  This list has 3 medication(s) that are the same as other medications prescribed for you. Read the directions carefully, and ask your doctor or other care provider to review them with you.         Where to get your medicines      These medications were sent to [id number] - [place] - [address]  [address] [facility] [address]     Phone:  [phone]     gabapentin 300 MG capsule    omeprazole 40 MG capsule         Some of these will need a paper prescription and  others can be bought over the counter.  Ask your nurse if you have questions.     Bring a paper prescription for each of these medications     study - crizotinib 250 MG Caps                Primary Care Provider Fax #    Physician No Ref-Primary [phone]       No address on file        Equal Access to Services     OLLIE SHARPE : Hadii aad ku hadmadison Dunlap, waomer luqaleksandra, nancy kaalfay sotelo, geraldo johnnyin hayaaotilio dhillonlauryn munson padmini cardenas. So [facility] [phone].    ATENCIÓN: Si habla español, tiene a alamo disposición servicios gratuitos de asistencia lingüística. Llame al [phone].    We comply with applicable federal civil rights laws and [place] laws. We do not discriminate on the basis of race, color, national origin, age, disability, sex, sexual orientation, or gender identity.            Thank you!     Thank you for choosing [facility]  for your care. Our goal is always to provide you with excellent care. Hearing back from our patients is one way we can continue to improve our services. Please take a few minutes to complete the written survey that you may receive in the mail after your visit with us. Thank you!             Your Updated Medication List - Protect others around you: Learn how to safely use, store and throw away your medicines at www.disposemymeds.org.          This list is accurate as of 5/22/18  5:31 PM.  Always use your most recent med list.                   Brand Name Dispense Instructions for use Diagnosis    diphenoxylate-atropine 2.5-0.025 MG per tablet    LOMOTIL    40 tablet    Take 1 tablet by mouth 4 times daily as needed for diarrhea    Diarrhea, unspecified type       enoxaparin 80 MG/0.8ML injection    LOVENOX    60 Syringe    Inject 1.3 mLs (130 mg) Subcutaneous 2 times daily    Cancer of distal third of esophagus (H)       fish oil-omega-3 fatty acids 1000 MG capsule      Take 2 g by mouth daily        furosemide 20 MG tablet    LASIX    30 tablet     Take 1 tablet (20 mg) by mouth daily    Cancer of distal third of esophagus (H), Generalized edema       gabapentin 300 MG capsule    NEURONTIN    180 capsule    Two tablets in the am and one tablet in the afternoon and evening    Chemotherapy-induced neuropathy (H)       IBUPROFEN PO      Take 600 mg by mouth every 4 hours as needed for moderate pain        lidocaine-prilocaine cream    EMLA    30 g    Apply topically as needed for moderate pain    Cancer of distal third of esophagus (H)       LORazepam 0.5 MG tablet    ATIVAN    30 tablet    Take 1 tablet (0.5 mg) by mouth every 4 hours as needed (Anxiety, Nausea/Vomiting or Sleep)    Cancer of distal third of esophagus (H)       Multi-vitamin Tabs tablet      Take 1 tablet by mouth daily        omeprazole 40 MG capsule    priLOSEC    90 capsule    Take 1 capsule (40 mg) by mouth daily    Cancer of distal third of esophagus (H)       ondansetron 8 MG tablet    ZOFRAN    30 tablet    Take 1 tablet (8 mg) by mouth every 8 hours as needed (Nausea/Vomiting)    Cancer of distal third of esophagus (H)       prochlorperazine 10 MG tablet    COMPAZINE    30 tablet    Take 1 tablet (10 mg) by mouth every 6 hours as needed (Nausea/Vomiting)    Cancer of distal third of esophagus (H)       * study - crizotinib 250 MG Caps    IDS# [id number]    56 capsule    Take 1 capsule (250 mg) by mouth every 12 hours . Take with or without food. If upset stomach (nausea) occurs take with food. Swallow capsules whole do not crush, dissolve, or open capsules.    Cancer of distal third of esophagus (H)       * study - crizotinib 250 MG Caps    IDS# [id number]    56 capsule    Take 1 capsule (250 mg) by mouth every 12 hours . Take with or without food. If upset stomach (nausea) occurs take with food. Swallow capsules whole do not crush, dissolve, or open capsules.    Cancer of distal third of esophagus (H)       * study - crizotinib 250 MG Caps    IDS# [id number]    56 capsule    Take 1 capsule (250 mg) by  mouth every 12 hours . Take with or without food. If upset stomach (nausea) occurs take with food. Swallow capsules whole do not crush, dissolve, or open capsules.    Cancer of distal third of esophagus (H)       timolol 0.5 % ophthalmic solution    TIMOPTIC     Place into both eyes daily    Cancer of distal third of esophagus (H)       zolpidem 10 MG tablet    AMBIEN    60 tablet    Take 1 tablet (10 mg) by mouth nightly as needed    Metastasis from gastric cancer (H)       * Notice:  This list has 3 medication(s) that are the same as other medications prescribed for you. Read the directions carefully, and ask your doctor or other care provider to review them with you.

## 2018-05-22 NOTE — PROGRESS NOTES
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 His tumor is positive for cytokeratin 20, negative for P63 and CK7, and HER2 is negative. He started on FOLFOX (5FU/oxaliplatin) on 5/15/2017. He had a delay in treatment from 9/5-10/2/17 due to work related injury.    He had an excellent response by imaging throughout the summer 2017.    He a mixed response by imaging in late fall 2017.    In January 2018, he was switched to taxol and cyramza.     In March 2018, he was started on the [facility] Match Clinical Trial with crizotinib.      INTERVAL HISTORY: Levi comes in today for followup.  He has been on crizotinib for two months.       He says overall that he is feeling reasonably well.   He is eating and drinking well.  He has no issues with constipation.  He has occasional diarrhea for which he will take Imodium or Lomotil.  His energy levels are reasonable.  He continues to be active.  He has noticed edema in his lower extremities.  The edema comes and goes but for the most part is persistent.    His biggest complaint continues to be heaviness in his feet.  He believes the gabapentin has helped some.  He however continues to complain of this heavy pain in his LE.    He remains on lovenox.    He reports two episodes of tightening in his  Chest that lasted for a few minutes and then resolved spontaneously.  He also reports slight more thickening in his throat.     His 10-point review of systems is otherwise negative.     PHYSICAL EXAMINATION  /82 (BP Location: Right arm, Patient Position: Sitting, Cuff Size: Adult Large)  Pulse 52  Temp 98.5  F (36.9  C) (Oral)  Resp 16  Ht 1.981 m (6' 5.99\")  Wt (!) 165.2 kg (364 lb 4.8 oz)  SpO2 95%  BMI 42.11 kg/m2 /90  Wt Readings from Last 4 Encounters:   05/22/18 (!) 165.2 " kg (364 lb 4.8 oz)   04/24/18 (!) 162.7 kg (358 lb 11 oz)   03/22/18 (!) 164.4 kg (362 lb 6.4 oz)   03/13/18 (!) 163 kg (359 lb 4.8 oz)     Constitutional: Alert, oriented male in no visible distress.  Eyes: PERRL. Anicteric sclerae.  ENT/Mouth: OM moist and pink without lesions or thrush.  CV: RRR  Resp: CTAB throughout  Abdomen: Soft, non-tender, non-distended. Obese. Bowel sounds present. Unable to palpate liver or spleen.   Extremities: No lower extremity edema   Skin: Warm, dry. 2+ edema to knees bilaterally  Lymph: No cervical or supraclavicular lymphadenopathy appreciated.   Neuro: CN II-XII grossly intact.  Absent reflexed at knees bilaterally    ECOG PS: 1    LABS:     Lab Results   Component Value Date    WBC 2.9 04/24/2018     Lab Results   Component Value Date    RBC 4.32 04/24/2018     Lab Results   Component Value Date    HGB 12.6 04/24/2018     Lab Results   Component Value Date    HCT 38.2 04/24/2018      Lab Results   Component Value Date    WBC 3.6 05/22/2018     Lab Results   Component Value Date    RBC 4.44 05/22/2018     Lab Results   Component Value Date    HGB 12.8 05/22/2018     Lab Results   Component Value Date    HCT 38.6 05/22/2018     No components found for: MCT  Lab Results   Component Value Date    MCV 87 05/22/2018     Lab Results   Component Value Date    MCH 28.8 05/22/2018     Lab Results   Component Value Date    MCHC 33.2 05/22/2018     Lab Results   Component Value Date    RDW 16.0 05/22/2018     Lab Results   Component Value Date     05/22/2018       Recent Labs   Lab Test  05/22/18   1129  04/24/18   1209   NA  140  144   POTASSIUM  4.3  4.1   CHLORIDE  108  113*   CO2  25  25   ANIONGAP  7  6   GLC  98  99   BUN  13  13   CR  0.86  0.89   NIDHI  7.9*  8.1*     Liver Function Studies -   Recent Labs   Lab Test  05/22/18   1129   PROTTOTAL  6.6*   ALBUMIN  2.9*   BILITOTAL  0.4   ALKPHOS  102   AST  20   ALT  38             [facility] testing - MET amplification and  KRAS amplification; results scanned in computer    IMPRESSION/PLAN:  1. Metastatic esophogeal cancer. Levi has been on FOLFOX.  He subsequently is on Taxol and Cyramza.  He is now on crizotinib via the [facility] Match trial.  The MET amplification makes him eligible for the crizotinib arm of the [facility] MATCH clinical trial.      I personally reviewed his imaging both myself and then again with radiology.  He has slight progression in a mass along the celiac trunk.  The remainder of his disease is stable.  Levi expressed frustration that his disease is stable.  He wishes there was more shrinkage and that it was not just stable.      He is tolerating therapy well with mild edema, and diarrhea.      Given the stability of his disease, I'd like him to return in four weeks.  He was instructed to call if he is having more symptoms or concerns for dysphagia.    2. Epigastric discomfort - likely gerd related.  Discussed a trial of omeprazole.    3. Edema -  I anticipate this is related to crizotinib.       4. He has a history of pulmonary embolism.  He is on Lovenox twice daily.  He has progressed through Xarelto in the past.       5 He does have baseline neuropathy.  Gabapentin was increased today with a goal of getting on 600mg tid.     He functionally is doing well and coping reasonably well.  He is here with his sister and his son today.      Kadi Dee MD

## 2018-05-23 NOTE — NURSING NOTE
"RESEARCH:    Patient here for C3D1 of MATCH trial, subprotocol C-1 with crizotinib.    Pt appears to be doing well.  Main c/o is of tightness in BLE.  Compounded by hx of neuropathy.  Using neurontin; dosing being increased to see if it increases comfort.  He also reported 2 episodes of chest tightness that was very brief and resolved.  Tells of some tightness/thickness in his throat.  GERD meds suggested.      Patient informed of disease stability per scans; not happy with that; wants treatment to cause the disease to shrink.  He's not sure how long he's willing to \"put up\" with the BLE heaviness if it's not shrinking the tumor.  Pt reminded that he can withdraw from the study at any time; that he is free to obtain treatment from any other providers in this or other systems.  Pt not interested in this.  Dr. Dee recommended he stay on the study for another month; he was encouraged to communicate any changes/increases in his sx. If there are increasing signs of disease progression, Dr. Dee will move onto another treatment plan.    Pt's sister and son were present and encouraged by the positive scan results and good lab results.      Pt   "

## 2018-05-25 ENCOUNTER — DOCUMENTATION ONLY (OUTPATIENT)
Dept: ONCOLOGY | Facility: CLINIC | Age: 58
End: 2018-05-25

## 2018-05-25 NOTE — PROGRESS NOTES
Per Patient's request,  completed and faxed [facility] referral for lodging dates 6/19 - 6/20. [facility] will contact Patient directly for confirmation of reservation.  will continue to provide support as needed.      Nieves Casper (Mart[place]), [facility], MSW   - [facility]  Phone: [phone]  Pager: [phone]  5/25/2018

## 2018-05-29 ENCOUNTER — CARE COORDINATION (OUTPATIENT)
Dept: ONCOLOGY | Facility: CLINIC | Age: 58
End: 2018-05-29

## 2018-05-29 NOTE — PROGRESS NOTES
Reason for Outgoing Call:   Placed call to patient for weekly check in   Patient Questions/Concerns:   Patient states he is doing well and has no concerns or needs at this time.   Nursing Action/Patient Instruction:  Enoucraged patient to call clinic should any new questions or concerns arise.   Patient Response/Evaluation:   Patient voiced understanding and was appreciative of call

## 2018-06-06 ENCOUNTER — TELEPHONE (OUTPATIENT)
Dept: ONCOLOGY | Facility: CLINIC | Age: 58
End: 2018-06-06

## 2018-06-06 ENCOUNTER — RESEARCH ENCOUNTER (OUTPATIENT)
Dept: ONCOLOGY | Facility: CLINIC | Age: 58
End: 2018-06-06

## 2018-06-06 ASSESSMENT — ENCOUNTER SYMPTOMS
FEVER: 0
SLEEP DISTURBANCES DUE TO BREATHING: 0
RECTAL PAIN: 0
JAUNDICE: 0
MUSCLE CRAMPS: 0
TINGLING: 1
DIZZINESS: 0
POLYDIPSIA: 0
MEMORY LOSS: 1
ORTHOPNEA: 0
SWOLLEN GLANDS: 0
SPEECH CHANGE: 0
SYNCOPE: 0
WEAKNESS: 1
INCREASED ENERGY: 1
LIGHT-HEADEDNESS: 0
STIFFNESS: 1
EXERCISE INTOLERANCE: 1
ABDOMINAL PAIN: 0
WEIGHT GAIN: 0
LOSS OF CONSCIOUSNESS: 0
BRUISES/BLEEDS EASILY: 0
WEIGHT LOSS: 0
NUMBNESS: 1
TREMORS: 0
PALPITATIONS: 1
BLOOD IN STOOL: 0
DECREASED APPETITE: 0
HYPERTENSION: 0
JOINT SWELLING: 0
HEADACHES: 0
NAUSEA: 0
ARTHRALGIAS: 1
BOWEL INCONTINENCE: 0
BLOATING: 0
LEG PAIN: 1
CONSTIPATION: 1
FATIGUE: 1
SEIZURES: 0
HYPOTENSION: 0
HEARTBURN: 0
DISTURBANCES IN COORDINATION: 1
MYALGIAS: 1
HALLUCINATIONS: 0
VOMITING: 1
MUSCLE WEAKNESS: 1
DIARRHEA: 1
PARALYSIS: 0
BACK PAIN: 0
NIGHT SWEATS: 0
POLYPHAGIA: 0
ALTERED TEMPERATURE REGULATION: 1
CHILLS: 1
NECK PAIN: 0

## 2018-06-06 NOTE — TELEPHONE ENCOUNTER
Social Work Note: Telephone Call  Oncology Clinic    Data/Intervention:  Patient Name:  Reuben Padilla  /Age:  1960 (57 year old)    Call From:  Pt to SW  Reason for Call:  Financial assistance    Assessment:  Received call from pt requesting financial assistance.  Reviewed pt's current situation and financial stressors.  Reviewed the option of [employer] or [employer].  Pt in agreement with SW completing application via online portal.  Anticipate pt will be over income for [employer] but eligible for [employer], informed pt of this, pt in agreement with applying for [employer], states appreciation for any assistance available to him.   Pt states no other questions or concerns at this time.    Plan:  SW available as necessary.         Nieves Casper (Martens), GASPER, MSW   - [facility]  Phone: [phone]  Pager: [phone]  2018

## 2018-06-08 ENCOUNTER — CARE COORDINATION (OUTPATIENT)
Dept: ONCOLOGY | Facility: CLINIC | Age: 58
End: 2018-06-08

## 2018-06-08 NOTE — PROGRESS NOTES
"Placed call to patient for weekly check in. Patient states overall he is doing well. States\"soreness here and there. I have a new pain in right foot about 3 inches behind toes.\" Denies any additional swelling. \"Nothing more than normal.\" Discussed RICE with patient. Encouraged patient to call if pain worsens. He has no other questions or concerns at this time.    "

## 2018-06-14 DIAGNOSIS — C15.5 CANCER OF DISTAL THIRD OF ESOPHAGUS (H): Primary | ICD-10-CM

## 2018-06-14 DIAGNOSIS — Z00.6 EXAMINATION OF PARTICIPANT IN CLINICAL TRIAL: ICD-10-CM

## 2018-06-19 ENCOUNTER — APPOINTMENT (OUTPATIENT)
Dept: LAB | Facility: CLINIC | Age: 58
End: 2018-06-19
Attending: INTERNAL MEDICINE
Payer: COMMERCIAL

## 2018-06-19 ENCOUNTER — ONCOLOGY VISIT (OUTPATIENT)
Dept: ONCOLOGY | Facility: CLINIC | Age: 58
End: 2018-06-19
Attending: INTERNAL MEDICINE
Payer: COMMERCIAL

## 2018-06-19 VITALS
SYSTOLIC BLOOD PRESSURE: 128 MMHG | OXYGEN SATURATION: 96 % | BODY MASS INDEX: 42.17 KG/M2 | WEIGHT: 315 LBS | DIASTOLIC BLOOD PRESSURE: 66 MMHG | HEART RATE: 66 BPM | RESPIRATION RATE: 16 BRPM | TEMPERATURE: 98.7 F

## 2018-06-19 DIAGNOSIS — C15.5 CANCER OF DISTAL THIRD OF ESOPHAGUS (H): Primary | ICD-10-CM

## 2018-06-19 DIAGNOSIS — Z00.6 EXAMINATION OF PARTICIPANT IN CLINICAL TRIAL: ICD-10-CM

## 2018-06-19 DIAGNOSIS — N52.2 DRUG-INDUCED ERECTILE DYSFUNCTION: ICD-10-CM

## 2018-06-19 LAB
ALBUMIN SERPL-MCNC: 3.1 G/DL (ref 3.4–5)
ALP SERPL-CCNC: 101 U/L (ref 40–150)
ALT SERPL W P-5'-P-CCNC: 35 U/L (ref 0–70)
ANION GAP SERPL CALCULATED.3IONS-SCNC: 7 MMOL/L (ref 3–14)
AST SERPL W P-5'-P-CCNC: 29 U/L (ref 0–45)
BASOPHILS # BLD AUTO: 0 10E9/L (ref 0–0.2)
BASOPHILS NFR BLD AUTO: 0.3 %
BILIRUB SERPL-MCNC: 0.6 MG/DL (ref 0.2–1.3)
BUN SERPL-MCNC: 11 MG/DL (ref 7–30)
CALCIUM SERPL-MCNC: 7.9 MG/DL (ref 8.5–10.1)
CHLORIDE SERPL-SCNC: 109 MMOL/L (ref 94–109)
CO2 SERPL-SCNC: 24 MMOL/L (ref 20–32)
CREAT SERPL-MCNC: 0.92 MG/DL (ref 0.66–1.25)
DIFFERENTIAL METHOD BLD: ABNORMAL
EOSINOPHIL # BLD AUTO: 0 10E9/L (ref 0–0.7)
EOSINOPHIL NFR BLD AUTO: 0.7 %
ERYTHROCYTE [DISTWIDTH] IN BLOOD BY AUTOMATED COUNT: 15.9 % (ref 10–15)
GFR SERPL CREATININE-BSD FRML MDRD: 85 ML/MIN/1.7M2
GLUCOSE SERPL-MCNC: 84 MG/DL (ref 70–99)
HCT VFR BLD AUTO: 38.6 % (ref 40–53)
HGB BLD-MCNC: 12.8 G/DL (ref 13.3–17.7)
IMM GRANULOCYTES # BLD: 0 10E9/L (ref 0–0.4)
IMM GRANULOCYTES NFR BLD: 0.3 %
LDH SERPL L TO P-CCNC: 232 U/L (ref 85–227)
LMWH PPP CHRO-ACNC: 0.75 IU/ML
LYMPHOCYTES # BLD AUTO: 0.8 10E9/L (ref 0.8–5.3)
LYMPHOCYTES NFR BLD AUTO: 25.6 %
MAGNESIUM SERPL-MCNC: 1.9 MG/DL (ref 1.6–2.3)
MCH RBC QN AUTO: 29.1 PG (ref 26.5–33)
MCHC RBC AUTO-ENTMCNC: 33.2 G/DL (ref 31.5–36.5)
MCV RBC AUTO: 88 FL (ref 78–100)
MONOCYTES # BLD AUTO: 0.4 10E9/L (ref 0–1.3)
MONOCYTES NFR BLD AUTO: 12.8 %
NEUTROPHILS # BLD AUTO: 1.8 10E9/L (ref 1.6–8.3)
NEUTROPHILS NFR BLD AUTO: 60.3 %
NRBC # BLD AUTO: 0 10*3/UL
NRBC BLD AUTO-RTO: 0 /100
PHOSPHATE SERPL-MCNC: 2.7 MG/DL (ref 2.5–4.5)
PLATELET # BLD AUTO: 127 10E9/L (ref 150–450)
POTASSIUM SERPL-SCNC: 4 MMOL/L (ref 3.4–5.3)
PROT SERPL-MCNC: 6.8 G/DL (ref 6.8–8.8)
RBC # BLD AUTO: 4.4 10E12/L (ref 4.4–5.9)
SODIUM SERPL-SCNC: 140 MMOL/L (ref 133–144)
WBC # BLD AUTO: 3 10E9/L (ref 4–11)

## 2018-06-19 PROCEDURE — 25000128 H RX IP 250 OP 636: Mod: ZF | Performed by: INTERNAL MEDICINE

## 2018-06-19 PROCEDURE — 84100 ASSAY OF PHOSPHORUS: CPT | Performed by: INTERNAL MEDICINE

## 2018-06-19 PROCEDURE — 99214 OFFICE O/P EST MOD 30 MIN: CPT | Mod: ZP | Performed by: INTERNAL MEDICINE

## 2018-06-19 PROCEDURE — 85520 HEPARIN ASSAY: CPT | Performed by: INTERNAL MEDICINE

## 2018-06-19 PROCEDURE — 83615 LACTATE (LD) (LDH) ENZYME: CPT | Performed by: INTERNAL MEDICINE

## 2018-06-19 PROCEDURE — 83735 ASSAY OF MAGNESIUM: CPT | Performed by: INTERNAL MEDICINE

## 2018-06-19 PROCEDURE — 85025 COMPLETE CBC W/AUTO DIFF WBC: CPT | Performed by: INTERNAL MEDICINE

## 2018-06-19 PROCEDURE — 80053 COMPREHEN METABOLIC PANEL: CPT | Performed by: INTERNAL MEDICINE

## 2018-06-19 PROCEDURE — G0463 HOSPITAL OUTPT CLINIC VISIT: HCPCS | Mod: ZF

## 2018-06-19 PROCEDURE — 36591 DRAW BLOOD OFF VENOUS DEVICE: CPT

## 2018-06-19 RX ORDER — SILDENAFIL 25 MG/1
50 TABLET, FILM COATED ORAL DAILY PRN
Qty: 10 TABLET | Refills: 3 | Status: SHIPPED | OUTPATIENT
Start: 2018-06-19 | End: 2018-07-24

## 2018-06-19 RX ORDER — HEPARIN SODIUM (PORCINE) LOCK FLUSH IV SOLN 100 UNIT/ML 100 UNIT/ML
5 SOLUTION INTRAVENOUS
Status: COMPLETED | OUTPATIENT
Start: 2018-06-19 | End: 2018-06-19

## 2018-06-19 RX ADMIN — SODIUM CHLORIDE, PRESERVATIVE FREE 5 ML: 5 INJECTION INTRAVENOUS at 15:44

## 2018-06-19 ASSESSMENT — PAIN SCALES - GENERAL: PAINLEVEL: NO PAIN (0)

## 2018-06-19 NOTE — LETTER
6/19/2018     RE: Reuben Padilla  [address] [place] [address]     Dear Colleague,    Thank you for referring your patient, Reuben Padilla, to the [facility]. Please see a copy of my visit note below.    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 His tumor is positive for cytokeratin 20, negative for P63 and CK7, and HER2 is negative. He started on FOLFOX (5FU/oxaliplatin) on 5/15/2017. He had a delay in treatment from 9/5-10/2/17 due to work related injury.    He had an excellent response by imaging throughout the summer 2017.    He a mixed response by imaging in late fall 2017.    In January 2018, he was switched to taxol and cyramza.     In March 2018, he was started on the [facility] Match Clinical Trial with crizotinib.  (MET amplification)    INTERVAL HISTORY: Levi comes in today for followup.  He has been on crizotinib for three months.       He says overall that he is feeling reasonably well.   He actually thinks he is doing much better than on prior evaluation.  He is eating and drinking well.  He has no issues with constipation.  He has occasional diarrhea for which he will take Imodium or Lomotil.  His energy levels are reasonable.  He continues to be active.  He has noticed edema in his lower extremities.  He thinks overall this edema is much better.    His neuropathy has improved.  He is using gabapentin 3 tablets in the morning and two in the evening.    He remains on lovenox.     He reports decreased libido, and an inability to maintain an erection.     His 10-point review of systems is otherwise negative.     PHYSICAL EXAMINATION  /66 (BP Location: Right arm, Patient Position: Sitting, Cuff Size: Adult Large)  Pulse 66  Temp 98.7  F (37.1  C) (Oral)  Resp 16  Wt  (!) 165.5 kg (364 lb 12.8 oz)  SpO2 96%  BMI 42.17 kg/m2 /90  Wt Readings from Last 4 Encounters:   06/19/18 (!) 165.5 kg (364 lb 12.8 oz)   05/22/18 (!) 165.2 kg (364 lb 4.8 oz)   04/24/18 (!) 162.7 kg (358 lb 11 oz)   03/22/18 (!) 164.4 kg (362 lb 6.4 oz)     Constitutional: Alert, oriented male in no visible distress.  Eyes: PERRL. Anicteric sclerae.  ENT/Mouth: OM moist and pink without lesions or thrush.  CV: RRR  Resp: CTAB throughout  Abdomen: Soft, non-tender, non-distended. Obese. Bowel sounds present. Unable to palpate liver or spleen.   Extremities: No lower extremity edema   Skin: Warm, dry. 2+ edema to knees bilaterally  Lymph: No cervical or supraclavicular lymphadenopathy appreciated.   Neuro: CN II-XII grossly intact.  Absent reflexed at knees bilaterally    ECOG PS: 1    LABS:      Lab Results   Component Value Date    WBC 3.0 06/19/2018     Lab Results   Component Value Date    RBC 4.40 06/19/2018     Lab Results   Component Value Date    HGB 12.8 06/19/2018     Lab Results   Component Value Date    HCT 38.6 06/19/2018     No components found for: MCT  Lab Results   Component Value Date    MCV 88 06/19/2018     Lab Results   Component Value Date    MCH 29.1 06/19/2018     Lab Results   Component Value Date    MCHC 33.2 06/19/2018     Lab Results   Component Value Date    RDW 15.9 06/19/2018     Lab Results   Component Value Date     06/19/2018       Recent Labs   Lab Test  06/19/18   1551  05/22/18   1129   NA  140  140   POTASSIUM  4.0  4.3   CHLORIDE  109  108   CO2  24  25   ANIONGAP  7  7   GLC  84  98   BUN  11  13   CR  0.92  0.86   NIDHI  7.9*  7.9*     Liver Function Studies -   Recent Labs   Lab Test  06/19/18   1551   PROTTOTAL  6.8   ALBUMIN  3.1*   BILITOTAL  0.6   ALKPHOS  101   AST  29   ALT  35         [facility] testing - MET amplification and KRAS amplification; results scanned in computer    IMPRESSION/PLAN:  1. Metastatic esophogeal cancer. Levi has been on  FOLFOX.  He subsequently is on Taxol and Cyramza.  He is now on crizotinib via the [facility] Match trial.  The MET amplification makes him eligible for the crizotinib arm of the [facility] MATCH clinical trial.      I discussed that he is doing well on crizotinib.  He will continue on the current treatment, and return in 4 weeks with imaging.    He is tolerating therapy well with mild edema, and diarrhea.       2. Epigastric discomfort - likely gerd related.  Overall this is improved.    3. Edema -  I anticipate this is related to crizotinib.       4. He has a history of pulmonary embolism.  He is on Lovenox twice daily.  He has progressed through Xarelto in the past.       5 He does have baseline neuropathy.  Gabapentin is maintained at 600/300/600 mg daily.    6. Erectile dysfunction - we discussed trial of viagra.     He functionally is doing well and coping reasonably well.        Kadi Dee MD

## 2018-06-19 NOTE — PROGRESS NOTES
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 His tumor is positive for cytokeratin 20, negative for P63 and CK7, and HER2 is negative. He started on FOLFOX (5FU/oxaliplatin) on 5/15/2017. He had a delay in treatment from 9/5-10/2/17 due to work related injury.    He had an excellent response by imaging throughout the summer 2017.    He a mixed response by imaging in late fall 2017.    In January 2018, he was switched to taxol and cyramza.     In March 2018, he was started on the [facility] Clinical Trial with crizotinib.  (MET amplification)    INTERVAL HISTORY: Levi comes in today for followup.  He has been on crizotinib for three months.       He says overall that he is feeling reasonably well.   He actually thinks he is doing much better than on prior evaluation.  He is eating and drinking well.  He has no issues with constipation.  He has occasional diarrhea for which he will take Imodium or Lomotil.  His energy levels are reasonable.  He continues to be active.  He has noticed edema in his lower extremities.  He thinks overall this edema is much better.    His neuropathy has improved.  He is using gabapentin 3 tablets in the morning and two in the evening.    He remains on lovenox.     He reports decreased libido, and an inability to maintain an erection.     His 10-point review of systems is otherwise negative.     PHYSICAL EXAMINATION  /66 (BP Location: Right arm, Patient Position: Sitting, Cuff Size: Adult Large)  Pulse 66  Temp 98.7  F (37.1  C) (Oral)  Resp 16  Wt (!) 165.5 kg (364 lb 12.8 oz)  SpO2 96%  BMI 42.17 kg/m2 /90  Wt Readings from Last 4 Encounters:   06/19/18 (!) 165.5 kg (364 lb 12.8 oz)   05/22/18 (!) 165.2 kg (364 lb 4.8 oz)   04/24/18 (!) 162.7 kg (358 lb 11 oz)   03/22/18 (!)  164.4 kg (362 lb 6.4 oz)     Constitutional: Alert, oriented male in no visible distress.  Eyes: PERRL. Anicteric sclerae.  ENT/Mouth: OM moist and pink without lesions or thrush.  CV: RRR  Resp: CTAB throughout  Abdomen: Soft, non-tender, non-distended. Obese. Bowel sounds present. Unable to palpate liver or spleen.   Extremities: No lower extremity edema   Skin: Warm, dry. 2+ edema to knees bilaterally  Lymph: No cervical or supraclavicular lymphadenopathy appreciated.   Neuro: CN II-XII grossly intact.  Absent reflexed at knees bilaterally    ECOG PS: 1    LABS:      Lab Results   Component Value Date    WBC 3.0 06/19/2018     Lab Results   Component Value Date    RBC 4.40 06/19/2018     Lab Results   Component Value Date    HGB 12.8 06/19/2018     Lab Results   Component Value Date    HCT 38.6 06/19/2018     No components found for: MCT  Lab Results   Component Value Date    MCV 88 06/19/2018     Lab Results   Component Value Date    MCH 29.1 06/19/2018     Lab Results   Component Value Date    MCHC 33.2 06/19/2018     Lab Results   Component Value Date    RDW 15.9 06/19/2018     Lab Results   Component Value Date     06/19/2018       Recent Labs   Lab Test  06/19/18   1551  05/22/18   1129   NA  140  140   POTASSIUM  4.0  4.3   CHLORIDE  109  108   CO2  24  25   ANIONGAP  7  7   GLC  84  98   BUN  11  13   CR  0.92  0.86   NIDHI  7.9*  7.9*     Liver Function Studies -   Recent Labs   Lab Test  06/19/18   1551   PROTTOTAL  6.8   ALBUMIN  3.1*   BILITOTAL  0.6   ALKPHOS  101   AST  29   ALT  35         [facility] testing - MET amplification and KRAS amplification; results scanned in computer    IMPRESSION/PLAN:  1. Metastatic esophogeal cancer. Levi has been on FOLFOX.  He subsequently is on Taxol and Cyramza.  He is now on crizotinib via the [facility] Match trial.  The MET amplification makes him eligible for the crizotinib arm of the [facility] MATCH clinical trial.      I discussed that he is doing well on  crizotinib.  He will continue on the current treatment, and return in 4 weeks with imaging.    He is tolerating therapy well with mild edema, and diarrhea.       2. Epigastric discomfort - likely gerd related.  Overall this is improved.    3. Edema -  I anticipate this is related to crizotinib.       4. He has a history of pulmonary embolism.  He is on Lovenox twice daily.  He has progressed through Xarelto in the past.       5 He does have baseline neuropathy.  Gabapentin is maintained at 600/300/600 mg daily.    6. Erectile dysfunction - we discussed trial of viagra.     He functionally is doing well and coping reasonably well.        Kadi Dee MD

## 2018-06-19 NOTE — MR AVS SNAPSHOT
After Visit Summary   6/19/2018    Reuben Padilla    MRN: [id number]           Patient Information     Date Of Birth          1960        Visit Information        Provider Department      6/19/2018 4:00 PM Kadi Dee MD [facility]        Today's Diagnoses     Cancer of distal third of esophagus (H)    -  1    Examination of participant in clinical trial        Drug-induced erectile dysfunction           Follow-ups after your visit        Your next 10 appointments already scheduled     Jun 20, 2018  7:00 AM CDT   (Arrive by 6:45 AM)   New Patient Visit with Beka Ram MD   [facility] ([facility])    [address]  [address]  [facility] [id number] [phone]            Jul 17, 2018  1:00 PM CDT   CT CHEST/ABDOMEN/PELVIS W CONTRAST with UCCT2   [facility] CT ([facility])    [address]  1st Floor  [facility] [id number] [phone]           Please bring any scans or X-rays taken at other hospitals, if similar tests were done. Also bring a list of your medicines, including vitamins, minerals and over-the-counter drugs. It is safest to leave personal items at home.  Be sure to tell your doctor:   If you have any allergies.   If there s any chance you are pregnant.   If you are breastfeeding.  How to prepare:   Do not eat or drink for 2 hours before your exam. If you need to take medicine, you may take it with small sips of water. (We may ask you to take liquid medicine as well.)   Please wear loose clothing, such as a sweat suit or jogging clothes. Avoid snaps, zippers and other metal. We may ask you to undress and put on a hospital gown.  Please arrive 30 minutes early for your CT. Once in the department you might be asked to drink water 15-20 minutes prior to your exam.  If indicated you may be asked to drink an oral contrast in advance of your  CT.  If this is the case, the imaging team will let you know or be in contact with you prior to your appointment  Patients over 70 or patients with diabetes or kidney problems:   If you haven t had a blood test (creatinine test) within the last 30 days, the Cardiologist/Radiologist may require you to get this test prior to your exam.  If you have diabetes:   Continue to take your metformin medication on the day of your exam  If you have any questions, please call the Imaging Department where you will have your exam.            Jul 17, 2018  3:30 PM CDT   [facility] Lab Draw with  [facility] LAB DRAW   [facility] ([facility])    [address] [place] [address]  [address]  [facility] [address] [phone]            Jul 17, 2018  4:00 PM CDT   (Arrive by 3:45 PM)   Return Visit with Kadi Dee MD   [facility] ([facility])    [address] [place] [address]  [address]  [facility] [address] [phone]              Future tests that were ordered for you today     Open Future Orders        Priority Expected Expires Ordered    CT Chest/Abdomen/Pelvis w Contrast Routine  1/15/2019 6/19/2018            Who to contact     If you have questions or need follow up information about today's clinic visit or your schedule please contact [facility] directly at [phone].  Normal or non-critical lab and imaging results will be communicated to you by MyChart, letter or phone within 4 business days after the clinic has received the results. If you do not hear from us within 7 days, please contact the clinic through MyChart or phone. If you have a critical or abnormal lab result, we will notify you by phone as soon as possible.  Submit refill requests through [employer] or call your pharmacy and they will forward the refill request to us. Please allow 3 business days for your refill to be completed.          Additional Information  About Your Visit        [employer] Information     [employer] gives you secure access to your electronic health record. If you see a primary care provider, you can also send messages to your care team and make appointments. If you have questions, please call your primary care clinic.  If you do not have a primary care provider, please call [phone] and they will assist you.        Care EveryWhere ID     This is your Care EveryWhere ID. This could be used by other organizations to access your [facility] medical records  [id number]        Your Vitals Were     Pulse Temperature Respirations Pulse Oximetry BMI (Body Mass Index)       66 98.7  F (37.1  C) (Oral) 16 96% 42.17 kg/m2        Blood Pressure from Last 3 Encounters:   06/19/18 128/66   05/22/18 120/82   04/24/18 111/75    Weight from Last 3 Encounters:   06/19/18 (!) 165.5 kg (364 lb 12.8 oz)   05/22/18 (!) 165.2 kg (364 lb 4.8 oz)   04/24/18 (!) 162.7 kg (358 lb 11 oz)              We Performed the Following     CBC with platelets differential     Comprehensive metabolic panel     Heparin 10a Level     Lactate Dehydrogenase     Magnesium     Phosphorus          Today's Medication Changes          These changes are accurate as of 6/19/18  7:12 PM.  If you have any questions, ask your nurse or doctor.               Start taking these medicines.        Dose/Directions    sildenafil 25 MG tablet   Commonly known as:  VIAGRA   Used for:  Drug-induced erectile dysfunction, Cancer of distal third of esophagus (H), Examination of participant in clinical trial   Started by:  Kadi Dee MD        Dose:  50 mg   Take 2 tablets (50 mg) by mouth daily as needed Take two tablets one hour prior to use   Quantity:  10 tablet   Refills:  3         These medicines have changed or have updated prescriptions.        Dose/Directions    * study - crizotinib 250 MG Caps   Commonly known as:  IDS# 5011   This may have changed:  Another medication with the same name was added.  Make sure you understand how and when to take each.   Used for:  Cancer of distal third of esophagus (H)   Changed by:  Kadi Dee MD        Dose:  250 mg   Take 1 capsule (250 mg) by mouth every 12 hours . Take with or without food. If upset stomach (nausea) occurs take with food. Swallow capsules whole do not crush, dissolve, or open capsules.   Quantity:  56 capsule   Refills:  0       * study - crizotinib 250 MG Caps   Commonly known as:  IDS# 5011   This may have changed:  Another medication with the same name was added. Make sure you understand how and when to take each.   Used for:  Cancer of distal third of esophagus (H)   Changed by:  Kadi Dee MD        Dose:  250 mg   Take 1 capsule (250 mg) by mouth every 12 hours . Take with or without food. If upset stomach (nausea) occurs take with food. Swallow capsules whole do not crush, dissolve, or open capsules.   Quantity:  56 capsule   Refills:  0       * study - crizotinib 250 MG Caps   Commonly known as:  IDS# 5011   This may have changed:  Another medication with the same name was added. Make sure you understand how and when to take each.   Used for:  Cancer of distal third of esophagus (H)   Changed by:  Kadi Dee MD        Dose:  250 mg   Take 1 capsule (250 mg) by mouth every 12 hours . Take with or without food. If upset stomach (nausea) occurs take with food. Swallow capsules whole do not crush, dissolve, or open capsules.   Quantity:  56 capsule   Refills:  0       * study - crizotinib 250 MG Caps   Commonly known as:  IDS# 5011   This may have changed:  You were already taking a medication with the same name, and this prescription was added. Make sure you understand how and when to take each.   Used for:  Cancer of distal third of esophagus (H), Examination of participant in clinical trial, Drug-induced erectile dysfunction   Changed by:  Kadi Dee MD        Dose:  250 mg   Take 1 capsule (250 mg) by mouth every  12 hours . Take with or without food. If upset stomach (nausea) occurs take with food. Swallow capsules whole do not crush, dissolve, or open capsules.   Quantity:  56 capsule   Refills:  0       * Notice:  This list has 4 medication(s) that are the same as other medications prescribed for you. Read the directions carefully, and ask your doctor or other care provider to review them with you.         Where to get your medicines      These medications were sent to [id number] - [place], [place] - [address]  [address] [address]     Phone:  [phone]     sildenafil 25 MG tablet         Call your pharmacy to confirm that your medication is ready for pickup. It may take up to 24 hours for them to receive the prescription. If the prescription is not ready within 3 business days, please contact your clinic or your provider.     We will let you know when these medications are ready. If you don't hear back within 3 business days, please contact us.     study - crizotinib 250 MG Caps                Primary Care Provider Fax #    Physician No Ref-Primary [phone]       No address on file        Equal Access to Services     OLLIE SHARPE : Hadshahana canseco Sonigel, waaxda luqadaha, qaybta kaalmada adejames, geraldo washington . So [facility] [place] [facility] [phone].    ATENCIÓN: Si habla español, tiene a alamo disposición servicios gratuitos de asistencia lingüística. Llame al [phone].    We comply with applicable federal civil rights laws and [place] laws. We do not discriminate on the basis of race, color, national origin, age, disability, sex, sexual orientation, or gender identity.            Thank you!     Thank you for choosing [facility]  for your care. Our goal is always to provide you with excellent care. Hearing back from our patients is one way we can continue to improve our services. Please take a few minutes to complete the written survey that  you may receive in the mail after your visit with us. Thank you!             Your Updated Medication List - Protect others around you: Learn how to safely use, store and throw away your medicines at www.disposemymeds.org.          This list is accurate as of 6/19/18  7:12 PM.  Always use your most recent med list.                   Brand Name Dispense Instructions for use Diagnosis    diphenoxylate-atropine 2.5-0.025 MG per tablet    LOMOTIL    40 tablet    Take 1 tablet by mouth 4 times daily as needed for diarrhea    Diarrhea, unspecified type       enoxaparin 80 MG/0.8ML injection    LOVENOX    60 Syringe    Inject 1.3 mLs (130 mg) Subcutaneous 2 times daily    Cancer of distal third of esophagus (H)       fish oil-omega-3 fatty acids 1000 MG capsule      Take 2 g by mouth daily        furosemide 20 MG tablet    LASIX    30 tablet    Take 1 tablet (20 mg) by mouth daily    Cancer of distal third of esophagus (H), Generalized edema       gabapentin 300 MG capsule    NEURONTIN    180 capsule    Two tablets in the am and one tablet in the afternoon and evening    Chemotherapy-induced neuropathy (H)       IBUPROFEN PO      Take 600 mg by mouth every 4 hours as needed for moderate pain        lidocaine-prilocaine cream    EMLA    30 g    Apply topically as needed for moderate pain    Cancer of distal third of esophagus (H)       LORazepam 0.5 MG tablet    ATIVAN    30 tablet    Take 1 tablet (0.5 mg) by mouth every 4 hours as needed (Anxiety, Nausea/Vomiting or Sleep)    Cancer of distal third of esophagus (H)       Multi-vitamin Tabs tablet      Take 1 tablet by mouth daily        omeprazole 40 MG capsule    priLOSEC    90 capsule    Take 1 capsule (40 mg) by mouth daily    Cancer of distal third of esophagus (H)       ondansetron 8 MG tablet    ZOFRAN    30 tablet    Take 1 tablet (8 mg) by mouth every 8 hours as needed (Nausea/Vomiting)    Cancer of distal third of esophagus (H)       prochlorperazine 10 MG tablet     COMPAZINE    30 tablet    Take 1 tablet (10 mg) by mouth every 6 hours as needed (Nausea/Vomiting)    Cancer of distal third of esophagus (H)       sildenafil 25 MG tablet    VIAGRA    10 tablet    Take 2 tablets (50 mg) by mouth daily as needed Take two tablets one hour prior to use    Drug-induced erectile dysfunction, Cancer of distal third of esophagus (H), Examination of participant in clinical trial       * study - crizotinib 250 MG Caps    IDS# 5011    56 capsule    Take 1 capsule (250 mg) by mouth every 12 hours . Take with or without food. If upset stomach (nausea) occurs take with food. Swallow capsules whole do not crush, dissolve, or open capsules.    Cancer of distal third of esophagus (H)       * study - crizotinib 250 MG Caps    IDS# 5011    56 capsule    Take 1 capsule (250 mg) by mouth every 12 hours . Take with or without food. If upset stomach (nausea) occurs take with food. Swallow capsules whole do not crush, dissolve, or open capsules.    Cancer of distal third of esophagus (H)       * study - crizotinib 250 MG Caps    IDS# 5011    56 capsule    Take 1 capsule (250 mg) by mouth every 12 hours . Take with or without food. If upset stomach (nausea) occurs take with food. Swallow capsules whole do not crush, dissolve, or open capsules.    Cancer of distal third of esophagus (H)       * study - crizotinib 250 MG Caps    IDS# 5011    56 capsule    Take 1 capsule (250 mg) by mouth every 12 hours . Take with or without food. If upset stomach (nausea) occurs take with food. Swallow capsules whole do not crush, dissolve, or open capsules.    Cancer of distal third of esophagus (H), Examination of participant in clinical trial, Drug-induced erectile dysfunction       timolol 0.5 % ophthalmic solution    TIMOPTIC     Place into both eyes daily    Cancer of distal third of esophagus (H)       zolpidem 10 MG tablet    AMBIEN    60 tablet    Take 1 tablet (10 mg) by mouth nightly as needed    Metastasis  from gastric cancer (H)       * Notice:  This list has 4 medication(s) that are the same as other medications prescribed for you. Read the directions carefully, and ask your doctor or other care provider to review them with you.

## 2018-06-19 NOTE — NURSING NOTE
"Chief Complaint   Patient presents with     Port Draw     labs drawn from port by rn.  vs taken     Port accessed with 20 gauge 3/4\" gripper needle and labs drawn by rn.  Port flushed with NS and heparin then de-accessed.  Pt tolerated well.  VS taken.  Pt checked in for next appt.  Faye Solano RN      "

## 2018-06-19 NOTE — NURSING NOTE
"Oncology Rooming Note    June 19, 2018 4:01 PM   Reuben Padilla is a 57 year old male who presents for:    Chief Complaint   Patient presents with     Port Draw     labs drawn from port by rn.  vs taken     Oncology Clinic Visit     retutn - esophageal ca      Initial Vitals: /66 (BP Location: Right arm, Patient Position: Sitting, Cuff Size: Adult Large)  Pulse 66  Temp 98.7  F (37.1  C) (Oral)  Resp 16  Wt (!) 165.5 kg (364 lb 12.8 oz)  SpO2 96%  BMI 42.17 kg/m2 Estimated body mass index is 42.17 kg/(m^2) as calculated from the following:    Height as of 5/22/18: 1.981 m (6' 5.99\").    Weight as of this encounter: 165.5 kg (364 lb 12.8 oz). Body surface area is 3.02 meters squared.  No Pain (0) Comment: Data Unavailable   No LMP for male patient.  Allergies reviewed: Yes  Medications reviewed: Yes    Medications: Medication refills not needed today.  Pharmacy name entered into [employer]:    [id number] - [place] - [address] [place] [address] AT INTERSECTION [address] [place], [place] - [phone]  [id number] [place], [place] - [address]    Clinical concerns: no new concerns     6 minutes for nursing intake (face to face time)     Noris Chun CMA            "

## 2018-06-20 ENCOUNTER — OFFICE VISIT (OUTPATIENT)
Dept: PALLIATIVE CARE | Facility: CLINIC | Age: 58
End: 2018-06-20
Attending: INTERNAL MEDICINE
Payer: COMMERCIAL

## 2018-06-20 VITALS
TEMPERATURE: 97.6 F | HEIGHT: 78 IN | HEART RATE: 70 BPM | BODY MASS INDEX: 36.45 KG/M2 | DIASTOLIC BLOOD PRESSURE: 81 MMHG | SYSTOLIC BLOOD PRESSURE: 126 MMHG | RESPIRATION RATE: 16 BRPM | WEIGHT: 315 LBS | OXYGEN SATURATION: 94 %

## 2018-06-20 DIAGNOSIS — Z71.89 ACP (ADVANCE CARE PLANNING): Chronic | ICD-10-CM

## 2018-06-20 DIAGNOSIS — C15.5 CANCER OF DISTAL THIRD OF ESOPHAGUS (H): Primary | ICD-10-CM

## 2018-06-20 PROCEDURE — 99204 OFFICE O/P NEW MOD 45 MIN: CPT | Performed by: INTERNAL MEDICINE

## 2018-06-20 PROCEDURE — G0463 HOSPITAL OUTPT CLINIC VISIT: HCPCS | Mod: ZF

## 2018-06-20 ASSESSMENT — PAIN SCALES - GENERAL: PAINLEVEL: NO PAIN (0)

## 2018-06-20 NOTE — LETTER
6/20/2018       RE: Reuben Padilla  [address] [facility] [place][note 2[place] [address]     Dear Colleague,    Thank you for referring your patient, Reuben Padilla, to the [facility] at [facility]. Please see a copy of my visit note below.    Palliative Staff/Outpatient Clinic    (This note was transcribed using voice recognition software. While I review and edit the transcription, I may miss errors, and the software sometimes does unexpected capitalizations and formatting that I miss. Please let me know of any serious mistranscriptions and I will addend this note.)    Patient ID:  He has metastatic esophageal cancer diagnosed early 2017  He underwent FOLFOX chemotherapy in early 2017 and switched to Taxol and Cyramza and then in March switched to crizotinib due to the NCI match trial.  Course complicated by a PE & neuropathy    +HCD completed in chart; names Jenniffer Antoine his sister as primary agent. Sister significantly involved in his care.  Saw Darrius Lacy PsyD several times late 2017/early 2018    Lives alone.  Was working in the [employer] feels in [place].  Currently is on disability.  No financial concerns.  Sister is his major advocate.  He has 2 brothers and a son and parents that is closely involved with.  Remote tobacco.  Minimal alcohol.  No substance use disorder history.    Serious illness conversation 6/19/18    History:  He is seen alone today.    Physically overall he feels like he is doing okay.  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  He feels like his gait is stable.  He has some fatigue and global mild reductions in strength compared to his baseline but otherwise no other pain.  He has mild insomnia and takes Ambien sometimes.  Appetite is good and no GI concerns today.    Mood: He will reports this is okay.  He says he is irritable sometimes and gets  "annoyed with people who are harassing him and tell him just to stay positive.  He is much more realistic than that.  He skeptical of a lot of the advice he gives him.  That being said he denies significant anhedonia or hopelessness or helplessness.  He has things he is looking forward to in the future and enjoys his day-to-day life.  He is planning on doing some traveling.  He has good connections with his siblings and son and parents which are really meaningful for him.    Goals: See serious illness conversation.  He really struggled towards the end of FOLFOX and would not be willing to undergo significantly toxic chemotherapy again just for a chance to live longer.  Quality of life is more important than quantity although he is hoping that he can have more time with good quality of life and is grateful that at least for now he is having minimal side effects from the crizotinib.  He is not sure if it is working-it is too early to tell.  We talk about end-of-life care a little bit but he says he really does not want to talk in detail because he tends to get freaked out by it.  He was in the past made to feel really scared that he is going to die in terrible pain and we talked about that not being inevitable.    SH: Lives alone.  Was working in the [employer] feels in [place].  Currently is on disability.  No financial concerns.  Sister is his major advocate.  He has 2 brothers and a son and parents that is closely involved with.  Remote tobacco.  Minimal alcohol.  No substance use disorder history.    ROS: Patient completed comprehensive electronic ROS form reviewed and confirmed key results with patient today.     PE: /81  Pulse 70  Temp 97.6  F (36.4  C) (Oral)  Resp 16  Ht 1.981 m (6' 6\")  Wt (!) 165.2 kg (364 lb 2 oz)  SpO2 94%  BMI 42.08 kg/m2    Wt Readings from Last 3 Encounters:   06/19/18 (!) 165.5 kg (364 lb 12.8 oz)   05/22/18 (!) 165.2 kg (364 lb 4.8 oz)   04/24/18 (!) 162.7 kg (358 lb 11 oz) "     Alert, comfortable appearing, NAD.   Head NCAT.  Eyes anicteric without injection  Face symmetric, eyes conjugate  Mouth pink, moist, no lesions. Upper dentures  Neck supple without masses, thyromegaly, LAD. R chest mediport, unremarkable  Lungs unlabored, CTAB  CV rrr s1s2  Abd soft, ntnd, benign, obese  Bilateral chronic LE edema changes with bronzing of skin  Skin warm, dry,   MSK joints of hand normal;   Neuro Face symmetric,  Neuropsych exam normal including affect, sensorium, gross memory, thought processes, and fund of knowledge.     Impression & Recommendations:  57-year-old man with metastatic esophageal cancer.    Overall he is doing okay I think.  Both physically and emotionally.  He is clearly very glib and I wonder if that alarms a lot of people in his life but I do not think he is depressed at all.  We talked a lot about mood and coping and remaining hopeful also remaining realistic.  He gets lots of mixed messages from people in his life about that and he finds it really annoying.  Overall I think he is doing well with this.  Explained the role of our program.  We are happy to see him back at any time with either physical or emotional/mood/coping/care planning challenges.    Serious Illness Conversation    SERIOUS ILLNESS CONVERSATION 6/20/2018   People Present Patient   What is your understanding now of where you are with your illness? Appropriate/accurate understanding of prognosis   How much information about what is likely to be ahead with your illness would you like from me? Patient wants to talk about big picture, but not details   Prognosis Communication Comments He was told 2-3 years survival, about 15 months ago, and accepts that that is what is realistic for him   What are your most important goals if your health situation worsens? Select all that apply Live as long as possible as long as quality of life is good (document what that means to patient in comment row);Be independent;Be in  control of decisions   What are your biggest fears and worries about the future with your health? Pain   Fears/Worries Comment Getting toxic treatments that don't help him. Right now he feels chemo is not toxic and is grateful for that.    What gives you strength as you think about your future with your illness? Acceptance/Need to be realistic   What abilities are so critical to your life that you can't imagine living without them?  being able to interact with others   If you become sicker, how much are you willing to go through for the possibility of gaining more time?  Be in the hospital;Undergo aggressive tests, procedures, surgeries   How much does your family know about your priorities and wishes? Extensive discussion with family about goals and wishes         Chart data reviewed today:  No family history on file. Siblings alive and well  Past Medical History:   Diagnosis Date     Cancer (H)     esophageal     Glaucoma      Paroxysmal a-fib (H)      Tubular adenoma 02/2017     Past Surgical History:   Procedure Laterality Date     AMPUTATION      toe     ARTHROSCOPY KNEE       bunion surgery       CHOLECYSTECTOMY       COLONOSCOPY  02/2017    remove 2 polyps     INSERT PORT VASCULAR ACCESS Right 5/9/2017    Procedure: INSERT PORT VASCULAR ACCESS;  Single Lumen Chest Power Port;  Surgeon: Jasiel Hu PA-C;  [facility]: [facility]     Allergies   Allergen Reactions     Penicillin G Other (See Comments)     Pt not sure-     Penicillins Unknown     Medications: I have reviewed the patient's medication profile. MNPMP: reviewed    Data reviewed:  I reviewed recent labs and imaging, my comments:  Cr 0.9  Na 140    CT May  Impression:   1. Stable appearance of the primary tumor indicated by circumferential  wall thickening of the distal esophagus.  2. Mild increase in metastatic disease burden, as evident by  increasing size of metastatic mass extending from the right  diaphragmatic andrey into the central  mesentery, encasing the celiac and  splenic arteries, and likely invading the pelvis. There is also mild  increase in paraesophageal lymph nodes. The remaining presumed  metastatic sites remain stable. No new suspicious metastatic lesion  noted in chest, abdomen, or pelvis.  3. Stable appearance of a presumed metastatic focus in hepatic segment  VI.    Thank you for involving us in the patient's care.   Beka Ram MD / Palliative Medicine / Pager [phone] / [facility] Inpatient Team Consult Pager [phone] (answered 8am-430pm M-F) - ok to text page via [employer] / After-Hours Answering Service [phone] / Palliative Clinic in the [facility] [place] [facility] [place] [phone] (scheduling); [phone] (triage).

## 2018-06-20 NOTE — PROGRESS NOTES
Palliative Staff/Outpatient Clinic    (This note was transcribed using voice recognition software. While I review and edit the transcription, I may miss errors, and the software sometimes does unexpected capitalizations and formatting that I miss. Please let me know of any serious mistranscriptions and I will addend this note.)    Patient ID:  He has metastatic esophageal cancer diagnosed early 2017  He underwent FOLFOX chemotherapy in early 2017 and switched to Taxol and Cyramza and then in March switched to crizotinib due to the NCI match trial.  Course complicated by a PE & neuropathy    +HCD completed in chart; names Jenniffer nAtoine his sister as primary agent. Sister significantly involved in his care.  Saw Darrius Lacy Amy several times late 2017/early 2018    Lives alone.  Was working in the [employer] feels in [place].  Currently is on disability.  No financial concerns.  Sister is his major advocate.  He has 2 brothers and a son and parents that is closely involved with.  Remote tobacco.  Minimal alcohol.  No substance use disorder history.    Serious illness conversation 6/19/18    History:  He is seen alone today.    Physically overall he feels like he is doing okay.  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  He feels like his gait is stable.  He has some fatigue and global mild reductions in strength compared to his baseline but otherwise no other pain.  He has mild insomnia and takes Ambien sometimes.  Appetite is good and no GI concerns today.    Mood: He will reports this is okay.  He says he is irritable sometimes and gets annoyed with people who are harassing him and tell him just to stay positive.  He is much more realistic than that.  He skeptical of a lot of the advice he gives him.  That being said he denies significant anhedonia or hopelessness or helplessness.  He has things he is looking forward to in the  "future and enjoys his day-to-day life.  He is planning on doing some traveling.  He has good connections with his siblings and son and parents which are really meaningful for him.    Goals: See serious illness conversation.  He really struggled towards the end of FOLFOX and would not be willing to undergo significantly toxic chemotherapy again just for a chance to live longer.  Quality of life is more important than quantity although he is hoping that he can have more time with good quality of life and is grateful that at least for now he is having minimal side effects from the crizotinib.  He is not sure if it is working-it is too early to tell.  We talk about end-of-life care a little bit but he says he really does not want to talk in detail because he tends to get freaked out by it.  He was in the past made to feel really scared that he is going to die in terrible pain and we talked about that not being inevitable.    SH: Lives alone.  Was working in the [employer] feels in [place].  Currently is on disability.  No financial concerns.  Sister is his major advocate.  He has 2 brothers and a son and parents that is closely involved with.  Remote tobacco.  Minimal alcohol.  No substance use disorder history.    ROS: Patient completed comprehensive electronic ROS form reviewed and confirmed key results with patient today.     PE: /81  Pulse 70  Temp 97.6  F (36.4  C) (Oral)  Resp 16  Ht 1.981 m (6' 6\")  Wt (!) 165.2 kg (364 lb 2 oz)  SpO2 94%  BMI 42.08 kg/m2    Wt Readings from Last 3 Encounters:   06/19/18 (!) 165.5 kg (364 lb 12.8 oz)   05/22/18 (!) 165.2 kg (364 lb 4.8 oz)   04/24/18 (!) 162.7 kg (358 lb 11 oz)     Alert, comfortable appearing, NAD.   Head NCAT.  Eyes anicteric without injection  Face symmetric, eyes conjugate  Mouth pink, moist, no lesions. Upper dentures  Neck supple without masses, thyromegaly, LAD. R chest mediport, unremarkable  Lungs unlabored, CTAB  CV rrr s1s2  Abd soft, ntnd, " benign, obese  Bilateral chronic LE edema changes with bronzing of skin  Skin warm, dry,   MSK joints of hand normal;   Neuro Face symmetric,  Neuropsych exam normal including affect, sensorium, gross memory, thought processes, and fund of knowledge.     Impression & Recommendations:  57-year-old man with metastatic esophageal cancer.    Overall he is doing okay I think.  Both physically and emotionally.  He is clearly very glib and I wonder if that alarms a lot of people in his life but I do not think he is depressed at all.  We talked a lot about mood and coping and remaining hopeful also remaining realistic.  He gets lots of mixed messages from people in his life about that and he finds it really annoying.  Overall I think he is doing well with this.  Explained the role of our program.  We are happy to see him back at any time with either physical or emotional/mood/coping/care planning challenges.    Serious Illness Conversation    SERIOUS ILLNESS CONVERSATION 6/20/2018   People Present Patient   What is your understanding now of where you are with your illness? Appropriate/accurate understanding of prognosis   How much information about what is likely to be ahead with your illness would you like from me? Patient wants to talk about big picture, but not details   Prognosis Communication Comments He was told 2-3 years survival, about 15 months ago, and accepts that that is what is realistic for him   What are your most important goals if your health situation worsens? Select all that apply Live as long as possible as long as quality of life is good (document what that means to patient in comment row);Be independent;Be in control of decisions   What are your biggest fears and worries about the future with your health? Pain   Fears/Worries Comment Getting toxic treatments that don't help him. Right now he feels chemo is not toxic and is grateful for that.    What gives you strength as you think about your future  with your illness? Acceptance/Need to be realistic   What abilities are so critical to your life that you can't imagine living without them?  being able to interact with others   If you become sicker, how much are you willing to go through for the possibility of gaining more time?  Be in the hospital;Undergo aggressive tests, procedures, surgeries   How much does your family know about your priorities and wishes? Extensive discussion with family about goals and wishes         Chart data reviewed today:  No family history on file. Siblings alive and well  Past Medical History:   Diagnosis Date     Cancer (H)     esophageal     Glaucoma      Paroxysmal a-fib (H)      Tubular adenoma 02/2017     Past Surgical History:   Procedure Laterality Date     AMPUTATION      toe     ARTHROSCOPY KNEE       bunion surgery       CHOLECYSTECTOMY       COLONOSCOPY  02/2017    remove 2 polyps     INSERT PORT VASCULAR ACCESS Right 5/9/2017    Procedure: INSERT PORT VASCULAR ACCESS;  Single Lumen Chest Power Port;  Surgeon: Jasiel Hu PA-C;  [facility]: [facility] OR     Allergies   Allergen Reactions     Penicillin G Other (See Comments)     Pt not sure-     Penicillins Unknown     Medications: I have reviewed the patient's medication profile. MNPMP: reviewed    Data reviewed:  I reviewed recent labs and imaging, my comments:  Cr 0.9  Na 140    CT May  Impression:   1. Stable appearance of the primary tumor indicated by circumferential  wall thickening of the distal esophagus.  2. Mild increase in metastatic disease burden, as evident by  increasing size of metastatic mass extending from the right  diaphragmatic andrey into the central mesentery, encasing the celiac and  splenic arteries, and likely invading the pelvis. There is also mild  increase in paraesophageal lymph nodes. The remaining presumed  metastatic sites remain stable. No new suspicious metastatic lesion  noted in chest, abdomen, or pelvis.  3. Stable appearance of a  presumed metastatic focus in hepatic segment  VI.    Thank you for involving us in the patient's care.   Beka Ram MD / Palliative Medicine / Pager [phone] / [facility] Inpatient Team Consult Pager [phone] (answered 8am-430pm M-F) - ok to text page via [employer] / After-Hours Answering Service [phone] / Palliative Clinic in the [facility] [place] [facility] [phone] (scheduling); [phone] (triage).

## 2018-06-20 NOTE — MR AVS SNAPSHOT
After Visit Summary   6/20/2018    Reuben Padilla    MRN: [id number]           Patient Information     Date Of Birth          1960        Visit Information        Provider Department      6/20/2018 7:00 AM Beka Ram MD [facility]        Today's Diagnoses     Cancer of distal third of esophagus (H)    -  1    ACP (advance care planning)           Follow-ups after your visit        Your next 10 appointments already scheduled     Jul 17, 2018  1:00 PM CDT   CT CHEST/ABDOMEN/PELVIS W CONTRAST with UCCT2   [facility] CT ([facility])    [address] [address] [phone]           Please bring any scans or X-rays taken at other hospitals, if similar tests were done. Also bring a list of your medicines, including vitamins, minerals and over-the-counter drugs. It is safest to leave personal items at home.  Be sure to tell your doctor:   If you have any allergies.   If there s any chance you are pregnant.   If you are breastfeeding.  How to prepare:   Do not eat or drink for 2 hours before your exam. If you need to take medicine, you may take it with small sips of water. (We may ask you to take liquid medicine as well.)   Please wear loose clothing, such as a sweat suit or jogging clothes. Avoid snaps, zippers and other metal. We may ask you to undress and put on a hospital gown.  Please arrive 30 minutes early for your CT. Once in the department you might be asked to drink water 15-20 minutes prior to your exam.  If indicated you may be asked to drink an oral contrast in advance of your CT.  If this is the case, the imaging team will let you know or be in contact with you prior to your appointment  Patients over 70 or patients with diabetes or kidney problems:   If you haven t had a blood test (creatinine test) within the last 30 days, the Cardiologist/Radiologist may require you to get this  test prior to your exam.  If you have diabetes:   Continue to take your metformin medication on the day of your exam  If you have any questions, please call the Imaging Department where you will have your exam.            Jul 17, 2018  3:30 PM CDT   [facility] Lab Draw with  doc[facility] LAB DRAW   [facility] Lab Draw ([facility])    [address]  [address]  [place] [id number] [phone]            Jul 17, 2018  4:00 PM CDT   (Arrive by 3:45 PM)   Return Visit with Kadi Dee MD   [facility] ([facility])    [address] [place] [address]  [address]  [place] [address] [phone]              Future tests that were ordered for you today     Open Future Orders        Priority Expected Expires Ordered    CT Chest/Abdomen/Pelvis w Contrast Routine  1/15/2019 6/19/2018            Who to contact     If you have questions or need follow up information about today's clinic visit or your schedule please contact [facility] directly at [phone].  Normal or non-critical lab and imaging results will be communicated to you by [employer] [employer], letter or phone within 4 business days after the clinic has received the results. If you do not hear from us within 7 days, please contact the clinic through [employer] or phone. If you have a critical or abnormal lab result, we will notify you by phone as soon as possible.  Submit refill requests through [employer] or call your pharmacy and they will forward the refill request to us. Please allow 3 business days for your refill to be completed.          Additional Information About Your Visit        [employer] Information     [employer] gives you secure access to your electronic health record. If you see a primary care provider, you can also send messages to your care team and make appointments. If you have questions, please call your primary care clinic.  If you do not have a primary care  "provider, please call [phone] and they will assist you.        Care EveryWhere ID     This is your Care EveryWhere ID. This could be used by other organizations to access your [place] medical records  [id number]        Your Vitals Were     Pulse Temperature Respirations Height Pulse Oximetry BMI (Body Mass Index)    70 97.6  F (36.4  C) (Oral) 16 1.981 m (6' 6\") 94% 42.08 kg/m2       Blood Pressure from Last 3 Encounters:   06/20/18 126/81   06/19/18 128/66   05/22/18 120/82    Weight from Last 3 Encounters:   06/20/18 (!) 165.2 kg (364 lb 2 oz)   06/19/18 (!) 165.5 kg (364 lb 12.8 oz)   05/22/18 (!) 165.2 kg (364 lb 4.8 oz)              Today, you had the following     No orders found for display       Primary Care Provider Fax #    Physician No Ref-Primary [phone]       No address on file        Equal Access to Services     CARLITOS [facility] : Hadii antonio navarroo Soomaali, waaxda luqadaha, qaybta kaalmada adeegyada, geraldo washington . So [facility] [phone].    ATENCIÓN: Si habla español, tiene a alamo disposición servicios gratuitos de asistencia lingüística. Llame al [phone].    We comply with applicable federal civil rights laws and [place] laws. We do not discriminate on the basis of race, color, national origin, age, disability, sex, sexual orientation, or gender identity.            Thank you!     Thank you for choosing [facility]  for your care. Our goal is always to provide you with excellent care. Hearing back from our patients is one way we can continue to improve our services. Please take a few minutes to complete the written survey that you may receive in the mail after your visit with us. Thank you!             Your Updated Medication List - Protect others around you: Learn how to safely use, store and throw away your medicines at www.disposemymeds.org.          This list is accurate as of 6/20/18  8:04 AM.  Always use your most recent med list. "                   Brand Name Dispense Instructions for use Diagnosis    diphenoxylate-atropine 2.5-0.025 MG per tablet    LOMOTIL    40 tablet    Take 1 tablet by mouth 4 times daily as needed for diarrhea    Diarrhea, unspecified type       enoxaparin 80 MG/0.8ML injection    LOVENOX    60 Syringe    Inject 1.3 mLs (130 mg) Subcutaneous 2 times daily    Cancer of distal third of esophagus (H)       fish oil-omega-3 fatty acids 1000 MG capsule      Take 2 g by mouth daily        furosemide 20 MG tablet    LASIX    30 tablet    Take 1 tablet (20 mg) by mouth daily    Cancer of distal third of esophagus (H), Generalized edema       gabapentin 300 MG capsule    NEURONTIN    180 capsule    Two tablets in the am and one tablet in the afternoon and evening    Chemotherapy-induced neuropathy (H)       lidocaine-prilocaine cream    EMLA    30 g    Apply topically as needed for moderate pain    Cancer of distal third of esophagus (H)       LORazepam 0.5 MG tablet    ATIVAN    30 tablet    Take 1 tablet (0.5 mg) by mouth every 4 hours as needed (Anxiety, Nausea/Vomiting or Sleep)    Cancer of distal third of esophagus (H)       Multi-vitamin Tabs tablet      Take 1 tablet by mouth daily        omeprazole 40 MG capsule    priLOSEC    90 capsule    Take 1 capsule (40 mg) by mouth daily    Cancer of distal third of esophagus (H)       ondansetron 8 MG tablet    ZOFRAN    30 tablet    Take 1 tablet (8 mg) by mouth every 8 hours as needed (Nausea/Vomiting)    Cancer of distal third of esophagus (H)       prochlorperazine 10 MG tablet    COMPAZINE    30 tablet    Take 1 tablet (10 mg) by mouth every 6 hours as needed (Nausea/Vomiting)    Cancer of distal third of esophagus (H)       sildenafil 25 MG tablet    VIAGRA    10 tablet    Take 2 tablets (50 mg) by mouth daily as needed Take two tablets one hour prior to use    Drug-induced erectile dysfunction, Cancer of distal third of esophagus (H), Examination of participant in  clinical trial       * study - crizotinib 250 MG Caps    IDS# [id number]    56 capsule    Take 1 capsule (250 mg) by mouth every 12 hours . Take with or without food. If upset stomach (nausea) occurs take with food. Swallow capsules whole do not crush, dissolve, or open capsules.    Cancer of distal third of esophagus (H)       * study - crizotinib 250 MG Caps    IDS# [id number]    56 capsule    Take 1 capsule (250 mg) by mouth every 12 hours . Take with or without food. If upset stomach (nausea) occurs take with food. Swallow capsules whole do not crush, dissolve, or open capsules.    Cancer of distal third of esophagus (H)       * study - crizotinib 250 MG Caps    IDS# [id number]    56 capsule    Take 1 capsule (250 mg) by mouth every 12 hours . Take with or without food. If upset stomach (nausea) occurs take with food. Swallow capsules whole do not crush, dissolve, or open capsules.    Cancer of distal third of esophagus (H)       * study - crizotinib 250 MG Caps    IDS# [id number]    56 capsule    Take 1 capsule (250 mg) by mouth every 12 hours . Take with or without food. If upset stomach (nausea) occurs take with food. Swallow capsules whole do not crush, dissolve, or open capsules.    Cancer of distal third of esophagus (H), Examination of participant in clinical trial, Drug-induced erectile dysfunction       timolol 0.5 % ophthalmic solution    TIMOPTIC     Place into both eyes daily    Cancer of distal third of esophagus (H)       zolpidem 10 MG tablet    AMBIEN    60 tablet    Take 1 tablet (10 mg) by mouth nightly as needed    Metastasis from gastric cancer (H)       * Notice:  This list has 4 medication(s) that are the same as other medications prescribed for you. Read the directions carefully, and ask your doctor or other care provider to review them with you.

## 2018-06-20 NOTE — NURSING NOTE
"Oncology Rooming Note    June 20, 2018 7:08 AM   Reuben Padilla is a 57 year old male who presents for:    Chief Complaint   Patient presents with     Oncology Clinic Visit     New Patient Palliative     Initial Vitals: /81  Pulse 70  Temp 97.6  F (36.4  C) (Oral)  Resp 16  Ht 1.981 m (6' 6\")  Wt (!) 165.2 kg (364 lb 2 oz)  SpO2 94%  BMI 42.08 kg/m2 Estimated body mass index is 42.08 kg/(m^2) as calculated from the following:    Height as of this encounter: 1.981 m (6' 6\").    Weight as of this encounter: 165.2 kg (364 lb 2 oz). Body surface area is 3.02 meters squared.  No Pain (0) Comment: Data Unavailable   No LMP for male patient.  Allergies reviewed: Yes  Medications reviewed: Yes    Medications: Medication refills not needed today.  Pharmacy name entered into [employer]:    [id number] - [place], [place] - [address] [place] [address] AT INTERSECTION 109[facility] [place], [place] - [address] [place] [address] [id number]  [id number] - [place], [place] - [address]    Clinical concerns: new patient today for Palliative care Dr. Ram was notified.    10 minutes for nursing intake (face to face time)     Isis Calhoun CMA              "

## 2018-06-21 NOTE — NURSING NOTE
RESEARCH:    Late entry:  Met briefly with patient and his sister at the conclusion of his visit with Dr. Dee.    Pt met the new research coordinator, Gail Schultz RN.  Since he decided to continue on with the research study treatment, treatment meds were given to him following his palliative appointment the morning after he met with Dr. Dee.    At the time he received his medication refill, he received a new diary and returned his old one.    Kate Ward RN  Clinical Research Coordinator- RN

## 2018-06-27 ENCOUNTER — TELEPHONE (OUTPATIENT)
Dept: ONCOLOGY | Facility: CLINIC | Age: 58
End: 2018-06-27

## 2018-06-27 ENCOUNTER — CARE COORDINATION (OUTPATIENT)
Dept: ONCOLOGY | Facility: CLINIC | Age: 58
End: 2018-06-27

## 2018-06-27 NOTE — PROGRESS NOTES
Placed call to patient for weekly check in. LM for patient to return call to clinic with any questions or concerns.

## 2018-06-27 NOTE — TELEPHONE ENCOUNTER
Per Patient's request,  completed and faxed [facility] referral for lodging dates 7/17 - 7/18. [facility] will contact Patient directly for confirmation of reservation.  will continue to provide support as needed.      Nieves Casper (Martens), [facility], MSW   - [facility]  Phone: [phone]  Pager: [phone]  6/27/2018

## 2018-06-28 NOTE — PROGRESS NOTES
Patient returned call. He states he is feeling well and is currently experiencing no side effects. However, he is concerned regarding lack on communication in receiving his research drug and feels there has been a lapse of communication with research department. Apologized to patient for this perception and explained research coordinators had recently changed. Will reach out to research coordinator for follow up. Patient was accepting of this action and thank me for the call.

## 2018-07-11 ENCOUNTER — CARE COORDINATION (OUTPATIENT)
Dept: ONCOLOGY | Facility: CLINIC | Age: 58
End: 2018-07-11

## 2018-07-11 NOTE — PROGRESS NOTES
Placed call to patient for weekly check in. Patient states he is doing well. He has no questions or concerns at this time. Patient is scheduled to follow up with Dr. Dee next week, he is aware of dates and times. He knows to call clinic should anything arise in the meantime.

## 2018-07-17 ENCOUNTER — ONCOLOGY VISIT (OUTPATIENT)
Dept: ONCOLOGY | Facility: CLINIC | Age: 58
End: 2018-07-17
Attending: INTERNAL MEDICINE
Payer: COMMERCIAL

## 2018-07-17 ENCOUNTER — RADIANT APPOINTMENT (OUTPATIENT)
Dept: CT IMAGING | Facility: CLINIC | Age: 58
End: 2018-07-17
Attending: INTERNAL MEDICINE
Payer: COMMERCIAL

## 2018-07-17 ENCOUNTER — RESEARCH ENCOUNTER (OUTPATIENT)
Dept: ONCOLOGY | Facility: CLINIC | Age: 58
End: 2018-07-17

## 2018-07-17 VITALS
HEART RATE: 67 BPM | RESPIRATION RATE: 16 BRPM | SYSTOLIC BLOOD PRESSURE: 113 MMHG | BODY MASS INDEX: 36.45 KG/M2 | TEMPERATURE: 98.4 F | DIASTOLIC BLOOD PRESSURE: 70 MMHG | WEIGHT: 315 LBS | HEIGHT: 78 IN | OXYGEN SATURATION: 96 %

## 2018-07-17 DIAGNOSIS — Z00.6 EXAMINATION OF PARTICIPANT IN CLINICAL TRIAL: ICD-10-CM

## 2018-07-17 DIAGNOSIS — C15.5 MALIGNANT NEOPLASM OF LOWER THIRD OF ESOPHAGUS (H): Primary | ICD-10-CM

## 2018-07-17 DIAGNOSIS — N52.2 DRUG-INDUCED ERECTILE DYSFUNCTION: ICD-10-CM

## 2018-07-17 DIAGNOSIS — C15.5 CANCER OF DISTAL THIRD OF ESOPHAGUS (H): Primary | ICD-10-CM

## 2018-07-17 DIAGNOSIS — C15.5 CANCER OF DISTAL THIRD OF ESOPHAGUS (H): ICD-10-CM

## 2018-07-17 LAB
ALBUMIN SERPL-MCNC: 3.1 G/DL (ref 3.4–5)
ALP SERPL-CCNC: 99 U/L (ref 40–150)
ALT SERPL W P-5'-P-CCNC: 33 U/L (ref 0–70)
ANION GAP SERPL CALCULATED.3IONS-SCNC: 10 MMOL/L (ref 3–14)
AST SERPL W P-5'-P-CCNC: 23 U/L (ref 0–45)
BASOPHILS # BLD AUTO: 0 10E9/L (ref 0–0.2)
BASOPHILS NFR BLD AUTO: 0.3 %
BILIRUB SERPL-MCNC: 0.8 MG/DL (ref 0.2–1.3)
BUN SERPL-MCNC: 15 MG/DL (ref 7–30)
CALCIUM SERPL-MCNC: 8.6 MG/DL (ref 8.5–10.1)
CHLORIDE SERPL-SCNC: 109 MMOL/L (ref 94–109)
CO2 SERPL-SCNC: 24 MMOL/L (ref 20–32)
CREAT SERPL-MCNC: 1.01 MG/DL (ref 0.66–1.25)
DIFFERENTIAL METHOD BLD: ABNORMAL
EOSINOPHIL # BLD AUTO: 0 10E9/L (ref 0–0.7)
EOSINOPHIL NFR BLD AUTO: 0.8 %
ERYTHROCYTE [DISTWIDTH] IN BLOOD BY AUTOMATED COUNT: 15.1 % (ref 10–15)
GFR SERPL CREATININE-BSD FRML MDRD: 76 ML/MIN/1.7M2
GLUCOSE SERPL-MCNC: 95 MG/DL (ref 70–99)
HCT VFR BLD AUTO: 40.2 % (ref 40–53)
HGB BLD-MCNC: 13.4 G/DL (ref 13.3–17.7)
IMM GRANULOCYTES # BLD: 0 10E9/L (ref 0–0.4)
IMM GRANULOCYTES NFR BLD: 0.5 %
LDH SERPL L TO P-CCNC: 252 U/L (ref 85–227)
LYMPHOCYTES # BLD AUTO: 1 10E9/L (ref 0.8–5.3)
LYMPHOCYTES NFR BLD AUTO: 25.6 %
MAGNESIUM SERPL-MCNC: 1.9 MG/DL (ref 1.6–2.3)
MCH RBC QN AUTO: 29.2 PG (ref 26.5–33)
MCHC RBC AUTO-ENTMCNC: 33.3 G/DL (ref 31.5–36.5)
MCV RBC AUTO: 88 FL (ref 78–100)
MONOCYTES # BLD AUTO: 0.3 10E9/L (ref 0–1.3)
MONOCYTES NFR BLD AUTO: 7.9 %
NEUTROPHILS # BLD AUTO: 2.5 10E9/L (ref 1.6–8.3)
NEUTROPHILS NFR BLD AUTO: 64.9 %
NRBC # BLD AUTO: 0 10*3/UL
NRBC BLD AUTO-RTO: 0 /100
PHOSPHATE SERPL-MCNC: 2.9 MG/DL (ref 2.5–4.5)
PLATELET # BLD AUTO: 153 10E9/L (ref 150–450)
POTASSIUM SERPL-SCNC: 4.5 MMOL/L (ref 3.4–5.3)
PROT SERPL-MCNC: 7.1 G/DL (ref 6.8–8.8)
RBC # BLD AUTO: 4.59 10E12/L (ref 4.4–5.9)
SODIUM SERPL-SCNC: 143 MMOL/L (ref 133–144)
WBC # BLD AUTO: 3.9 10E9/L (ref 4–11)

## 2018-07-17 PROCEDURE — 84100 ASSAY OF PHOSPHORUS: CPT | Performed by: INTERNAL MEDICINE

## 2018-07-17 PROCEDURE — 36591 DRAW BLOOD OFF VENOUS DEVICE: CPT

## 2018-07-17 PROCEDURE — 99215 OFFICE O/P EST HI 40 MIN: CPT | Mod: GC | Performed by: INTERNAL MEDICINE

## 2018-07-17 PROCEDURE — 83735 ASSAY OF MAGNESIUM: CPT | Performed by: INTERNAL MEDICINE

## 2018-07-17 PROCEDURE — G0463 HOSPITAL OUTPT CLINIC VISIT: HCPCS | Mod: ZF

## 2018-07-17 PROCEDURE — 83615 LACTATE (LD) (LDH) ENZYME: CPT | Performed by: INTERNAL MEDICINE

## 2018-07-17 PROCEDURE — 80053 COMPREHEN METABOLIC PANEL: CPT | Performed by: INTERNAL MEDICINE

## 2018-07-17 PROCEDURE — 85025 COMPLETE CBC W/AUTO DIFF WBC: CPT | Performed by: INTERNAL MEDICINE

## 2018-07-17 PROCEDURE — 25000128 H RX IP 250 OP 636: Mod: ZF | Performed by: INTERNAL MEDICINE

## 2018-07-17 RX ORDER — LATANOPROST 50 UG/ML
SOLUTION/ DROPS OPHTHALMIC
COMMUNITY
Start: 2018-07-16 | End: 2018-07-18

## 2018-07-17 RX ORDER — IOPAMIDOL 755 MG/ML
135 INJECTION, SOLUTION INTRAVASCULAR ONCE
Status: COMPLETED | OUTPATIENT
Start: 2018-07-17 | End: 2018-07-17

## 2018-07-17 RX ORDER — HEPARIN SODIUM (PORCINE) LOCK FLUSH IV SOLN 100 UNIT/ML 100 UNIT/ML
5 SOLUTION INTRAVENOUS ONCE
Status: COMPLETED | OUTPATIENT
Start: 2018-07-17 | End: 2018-07-17

## 2018-07-17 RX ORDER — HEPARIN SODIUM (PORCINE) LOCK FLUSH IV SOLN 100 UNIT/ML 100 UNIT/ML
500 SOLUTION INTRAVENOUS ONCE
Status: COMPLETED | OUTPATIENT
Start: 2018-07-17 | End: 2018-07-17

## 2018-07-17 RX ADMIN — HEPARIN SODIUM (PORCINE) LOCK FLUSH IV SOLN 100 UNIT/ML 500 UNITS: 100 SOLUTION at 13:35

## 2018-07-17 RX ADMIN — IOPAMIDOL 135 ML: 755 INJECTION, SOLUTION INTRAVASCULAR at 13:12

## 2018-07-17 RX ADMIN — SODIUM CHLORIDE, PRESERVATIVE FREE 5 ML: 5 INJECTION INTRAVENOUS at 12:36

## 2018-07-17 ASSESSMENT — PAIN SCALES - GENERAL: PAINLEVEL: NO PAIN (0)

## 2018-07-17 NOTE — PROGRESS NOTES
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 His tumor is positive for cytokeratin 20, negative for P63 and CK7, and HER2 is negative. He started on FOLFOX (5FU/oxaliplatin) on 5/15/2017. He had a delay in treatment from 9/5-10/2/17 due to work related injury.    He had an excellent response by imaging throughout the summer 2017.    He a mixed response by imaging in late fall 2017.    In January 2018, he was switched to taxol and cyramza - had slight progression and neuropathy and clinical trial became available.     In March 2018, he was started on the [facility] Clinical Trial with crizotinib.  (MET amplification)     INTERVAL HISTORY: Levi comes in today for followup.  He has been on crizotinib for four months.  He has been tolerating the medication without significant adverse effects.  He has been able to stay active and is helping fix up his parents home to be sold.  He is also going out on his ATV vehicle and doing things that he enjoys.  He has noticed in the last week and he has had more difficulty swallowing.  He continues to eat what he likes but is requiring more fluids to get the food down.  He states he has lost 4 pounds.  He otherwise denies pain, new lumps or bumps, new rash, change in bowel movements.  His edema is stable.    He remains on Lovenox without significant bleeding events.    His neuropathy is significantly improved and now only in his fingertips and bottom of his feet.  He feels that the gabapentin has helped quite a bit.     His 10-point review of systems is otherwise negative.     PHYSICAL EXAMINATION  /70 (BP Location: Right arm, Cuff Size: Adult Large)  Pulse 67  Temp 98.4  F (36.9  C) (Oral)  Resp 16  Ht 1.981 m (6' 6\")  Wt (!) 163.3 kg (360 lb)  SpO2 96%  BMI 41.6 kg/m2 BP " 130/90  Wt Readings from Last 4 Encounters:   07/17/18 (!) 163.3 kg (360 lb)   06/20/18 (!) 165.2 kg (364 lb 2 oz)   06/19/18 (!) 165.5 kg (364 lb 12.8 oz)   05/22/18 (!) 165.2 kg (364 lb 4.8 oz)     Constitutional: Alert, oriented male in no visible distress.  Eyes: PERRL. Anicteric sclerae.  ENT/Mouth: OM moist and pink without lesions or thrush.  CV: RRR  Resp: CTAB throughout  Abdomen: Soft, non-tender, non-distended. Obese. Bowel sounds present. Unable to palpate liver or spleen.   Extremities: 2+ pitting edema with brawny induration  Skin: Warm, dry.   Lymph: No cervical or supraclavicular lymphadenopathy appreciated.   Neuro: CN II-XII grossly intact.  Absent reflexed at knees bilaterally    ECOG PS: 1    LABS:  CBC RESULTS:   Recent Labs   Lab Test  07/17/18   1250   WBC  3.9*   RBC  4.59   HGB  13.4   HCT  40.2   MCV  88   MCH  29.2   MCHC  33.3   RDW  15.1*   PLT  153     Alb 3.1    [facility] testing - MET amplification and KRAS amplification; results scanned in computer      IMAGING:  CT CAP 7/17/18  IMPRESSION: In this patient with esophageal cancer, there is evidence  of progression of disease:  1a. Increased size of the ill-defined mass which abuts the esophageal  junction and diaphragmatic andrey extending into the retroperitoneum and  central mesentery.  1b. Increased size of a presumably paraesophageal lymph node.  1c. Worsening retroperitoneal lymphadenopathy including two dominant  peripancreatic/periceliac nodes. Additional unchanged enlarged  mediastinal adenopathy.  3. Unchanged compression deformities of T9, T12, and L2.    IMPRESSION/PLAN:  1. Metastatic esophogeal adenocarcinoma. He has had treatment with FOLFOX and then transitioned to Taxol with ramicurimab.  He had significant neuropathy with this treatment and did have slight progression during that time, however he has more recently progressed quite significantly on targeted therapy with crizotinib.  We will stop crizotinib and given  the symptoms he is experiencing with difficulty swallowing, we will resume chemotherapy as he seems to have a chemosensitive cancer.  His [facility] testing did return with low positive PDL 1 status, indicating he could be a candidate for Keytruda, however given his symptoms want more rapid control.  We will plan to resume Taxol with Cyramza to start next week.  Will also look into cabozantinib insurance coverage.    Also discussed that he should continue to enjoy his time and complete any potential travel plans he has, possibly a trip to [place], in the near future given that he is currently feeling well.    2. Edema -  stable     3. He has a history of pulmonary embolism.  He is on Lovenox twice daily.  He has progressed through Xarelto in the past.       4. He does have baseline neuropathy.  Gabapentin is maintained at 600/300/600 mg daily.    Patient seen and discussed with attending physician, Dr. Dee.     Jeanne Bryan MD  Heme/Onc PGY6  07/17/2018    Pt was seen and evaluated by me with Dr Bryan.  I edited the above note to reflect my evaluation.  I reviewed Reuben's scans with he, his son and his sister.  We discussed that he has disease progression.  I would like him to go off of NCI Match.      We discussed treatment options including the possibility of taxol/cyramza (this was discontinued due primarily to neuropathy), a trial of pembrolizumab (though his PDL1 expression is 1%) or possible clinical trial.  At this time, he would like to begin therapy quickly.  Will start taxol with cyramza and look into the other options.  We discussed the side effects of chemotherapy including myelosuppression, nausea/vomiting/diarrhea/constipation, hair loss, neuropathy, fertility complications, dehydration, cardiomyopathy, etc.  The patient will be receiving chemotherapy teaching through my nurse coordinator with handouts describing all of the side effects again.  Consent for chemotherapy was  obtained.    Sexual dysfunction - trial of cialis sent out to pharmacy.    He understands his therapies are not curative intent, but rather palliative.    Kadi Dee

## 2018-07-17 NOTE — DISCHARGE INSTRUCTIONS

## 2018-07-17 NOTE — NURSING NOTE
"Chief Complaint   Patient presents with     Port Draw     Labs drawn from port by RN. Line flushed with saline and heparin. Vs taken     Port accessed with 20g 3/4\" flat needle by RN, labs collected, line flushed with saline and heparin.  Vitals taken.    Carley Phipps, RN  "

## 2018-07-17 NOTE — NURSING NOTE
"Oncology Rooming Note    July 17, 2018 3:45 PM   Reuben Padilla is a 57 year old male who presents for:    Chief Complaint   Patient presents with     Port Draw     Labs drawn from port by RN. Line flushed with saline and heparin. Vs taken     Oncology Clinic Visit     Return: Esophageal CA     Initial Vitals: /70 (BP Location: Right arm, Cuff Size: Adult Large)  Pulse 67  Temp 98.4  F (36.9  C) (Oral)  Resp 16  Ht 1.981 m (6' 6\")  Wt (!) 163.3 kg (360 lb)  SpO2 96%  BMI 41.6 kg/m2 Estimated body mass index is 41.6 kg/(m^2) as calculated from the following:    Height as of this encounter: 1.981 m (6' 6\").    Weight as of this encounter: 163.3 kg (360 lb). Body surface area is 3 meters squared.  No Pain (0) Comment: pt does not like it when we ask if he's in pain   No LMP for male patient.  Allergies reviewed: Yes  Medications reviewed: Yes    Medications: Medication refills not needed today.  Pharmacy name entered into [employer]:    [id number] - [place] - [address] [place] [address] AT INTERSECTION [address] [place], [place] - [address] [place] [address] [phone]  [id number] [place], [place] - [address] [place]    Clinical concerns: new concerns are that he is noticing a difficulty in swollowing solid foods over the last week or so Dr. Dee was notified.    10 minutes for nursing intake (face to face time)     Isis Calhoun CMA              "

## 2018-07-17 NOTE — MR AVS SNAPSHOT
After Visit Summary   7/17/2018    Reuben Padilla    MRN: [id number]           Patient Information     Date Of Birth          1960        Visit Information        Provider Department      7/17/2018 4:00 PM Kadi Dee MD [facility]        Today's Diagnoses     Malignant neoplasm of lower third of esophagus (H)    -  1       Follow-ups after your visit        Your next 10 appointments already scheduled     Jul 24, 2018  3:00 PM CDT   [facility] Draw with [facility] DRAW   [facility] Draw ([facility])    [address] [place] [address]  [address]  [facility] [address]   [phone]            Jul 24, 2018  3:30 PM CDT   Infusion 120 with [facility] ONCOLOGY INFUSION, UC 29 ATC   [facility] ([facility])    [address] [place] [address]  [address]  [facility] [address] [phone]            Jul 31, 2018  1:00 PM CDT   [facility] Lab Draw with [facility] DRAW   [facility] Draw ([facility])    [address] [place] [address]  [address]  [facility] [address] [phone]            Jul 31, 2018  1:40 PM CDT   (Arrive by 1:25 PM)   Return Visit with Katalina Ramirez PA-C   [facility] ([facility])    [address] [place] [address]  [address]  [facility] [address] [phone]              Who to contact     If you have questions or need follow up information about today's clinic visit or your schedule please contact [facility] directly at [phone].  Normal or non-critical lab and imaging results will be communicated to you by MyChart, letter or phone within 4 business days after the clinic has received the results. If you do not hear from us within 7 days, please contact the clinic through MyChart or phone. If you have a critical or abnormal lab result, we will notify you by phone as soon as  "possible.  Submit refill requests through [employer] or call your pharmacy and they will forward the refill request to us. Please allow 3 business days for your refill to be completed.          Additional Information About Your Visit        [employer] Information     [employer] gives you secure access to your electronic health record. If you see a primary care provider, you can also send messages to your care team and make appointments. If you have questions, please call your primary care clinic.  If you do not have a primary care provider, please call [phone] and they will assist you.        Care EveryWhere ID     This is your Care EveryWhere ID. This could be used by other organizations to access your [place] medical records  [id number]        Your Vitals Were     Pulse Temperature Respirations Height Pulse Oximetry BMI (Body Mass Index)    67 98.4  F (36.9  C) (Oral) 16 1.981 m (6' 6\") 96% 41.6 kg/m2       Blood Pressure from Last 3 Encounters:   07/17/18 113/70   06/20/18 126/81   06/19/18 128/66    Weight from Last 3 Encounters:   07/17/18 (!) 163.3 kg (360 lb)   06/20/18 (!) 165.2 kg (364 lb 2 oz)   06/19/18 (!) 165.5 kg (364 lb 12.8 oz)              We Performed the Following     CBC with platelets differential     Comprehensive metabolic panel     Lactate Dehydrogenase     Magnesium     Phosphorus          Today's Medication Changes          These changes are accurate as of 7/17/18  5:22 PM.  If you have any questions, ask your nurse or doctor.               Stop taking these medicines if you haven't already. Please contact your care team if you have questions.     study - crizotinib 250 MG Caps   Commonly known as:  IDS# [id number]   Stopped by:  Kadi Dee MD                    Primary Care Provider Fax #    Physician No Ref-Primary [phone]       No address on file        Equal Access to Services     OLLIE SHARPE : Eliza Dunlap, yeyo davis, geraldo dumont " vernon dhillonlauryn mertjimi la'aan ah. So [facility] [phone].    ATENCIÓN: Si alayna rodriguez, tiene a alamo disposición servicios gratuitos de asistencia lingüística. Tanisha anderson [phone].    We comply with applicable federal civil rights laws and [place] laws. We do not discriminate on the basis of race, color, national origin, age, disability, sex, sexual orientation, or gender identity.            Thank you!     Thank you for choosing [facility]  for your care. Our goal is always to provide you with excellent care. Hearing back from our patients is one way we can continue to improve our services. Please take a few minutes to complete the written survey that you may receive in the mail after your visit with us. Thank you!             Your Updated Medication List - Protect others around you: Learn how to safely use, store and throw away your medicines at www.disposemymeds.org.          This list is accurate as of 7/17/18  5:22 PM.  Always use your most recent med list.                   Brand Name Dispense Instructions for use Diagnosis    diphenoxylate-atropine 2.5-0.025 MG per tablet    LOMOTIL    40 tablet    Take 1 tablet by mouth 4 times daily as needed for diarrhea    Diarrhea, unspecified type       enoxaparin 80 MG/0.8ML injection    LOVENOX    60 Syringe    Inject 1.3 mLs (130 mg) Subcutaneous 2 times daily    Cancer of distal third of esophagus (H)       fish oil-omega-3 fatty acids 1000 MG capsule      Take 2 g by mouth daily        furosemide 20 MG tablet    LASIX    30 tablet    Take 1 tablet (20 mg) by mouth daily    Cancer of distal third of esophagus (H), Generalized edema       gabapentin 300 MG capsule    NEURONTIN    180 capsule    Two tablets in the am and one tablet in the afternoon and evening    Chemotherapy-induced neuropathy (H)       latanoprost 0.005 % ophthalmic solution    XALATAN          lidocaine-prilocaine cream    EMLA    30 g    Apply topically as needed for  moderate pain    Cancer of distal third of esophagus (H)       LORazepam 0.5 MG tablet    ATIVAN    30 tablet    Take 1 tablet (0.5 mg) by mouth every 4 hours as needed (Anxiety, Nausea/Vomiting or Sleep)    Cancer of distal third of esophagus (H)       Multi-vitamin Tabs tablet      Take 1 tablet by mouth daily        omeprazole 40 MG capsule    priLOSEC    90 capsule    Take 1 capsule (40 mg) by mouth daily    Cancer of distal third of esophagus (H)       ondansetron 8 MG tablet    ZOFRAN    30 tablet    Take 1 tablet (8 mg) by mouth every 8 hours as needed (Nausea/Vomiting)    Cancer of distal third of esophagus (H)       prochlorperazine 10 MG tablet    COMPAZINE    30 tablet    Take 1 tablet (10 mg) by mouth every 6 hours as needed (Nausea/Vomiting)    Cancer of distal third of esophagus (H)       sildenafil 25 MG tablet    VIAGRA    10 tablet    Take 2 tablets (50 mg) by mouth daily as needed Take two tablets one hour prior to use    Drug-induced erectile dysfunction, Cancer of distal third of esophagus (H), Examination of participant in clinical trial       timolol 0.5 % ophthalmic solution    TIMOPTIC     Place into both eyes daily    Cancer of distal third of esophagus (H)       zolpidem 10 MG tablet    AMBIEN    60 tablet    Take 1 tablet (10 mg) by mouth nightly as needed    Metastasis from gastric cancer (H)

## 2018-07-17 NOTE — LETTER
7/17/2018       RE: Reuben Padilla  [address] [place] [address]     Dear Colleague,    Thank you for referring your patient, Reuben Padilla, to the [facility]. Please see a copy of my visit note below.    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 His tumor is positive for cytokeratin 20, negative for P63 and CK7, and HER2 is negative. He started on FOLFOX (5FU/oxaliplatin) on 5/15/2017. He had a delay in treatment from 9/5-10/2/17 due to work related injury.    He had an excellent response by imaging throughout the summer 2017.    He a mixed response by imaging in late fall 2017.    In January 2018, he was switched to taxol and cyramza - had slight progression and neuropathy and clinical trial became available.     In March 2018, he was started on the [facility] Match Clinical Trial with crizotinib.  (MET amplification)     INTERVAL HISTORY: Levi comes in today for followup.  He has been on crizotinib for four months.  He has been tolerating the medication without significant adverse effects.  He has been able to stay active and is helping fix up his parents home to be sold.  He is also going out on his ATV vehicle and doing things that he enjoys.  He has noticed in the last week and he has had more difficulty swallowing.  He continues to eat what he likes but is requiring more fluids to get the food down.  He states he has lost 4 pounds.  He otherwise denies pain, new lumps or bumps, new rash, change in bowel movements.  His edema is stable.    He remains on Lovenox without significant bleeding events.    His neuropathy is significantly improved and now only in his fingertips and bottom of his feet.  He feels that the gabapentin has helped quite a bit.     His 10-point review of systems  "is otherwise negative.     PHYSICAL EXAMINATION  /70 (BP Location: Right arm, Cuff Size: Adult Large)  Pulse 67  Temp 98.4  F (36.9  C) (Oral)  Resp 16  Ht 1.981 m (6' 6\")  Wt (!) 163.3 kg (360 lb)  SpO2 96%  BMI 41.6 kg/m2 /90  Wt Readings from Last 4 Encounters:   07/17/18 (!) 163.3 kg (360 lb)   06/20/18 (!) 165.2 kg (364 lb 2 oz)   06/19/18 (!) 165.5 kg (364 lb 12.8 oz)   05/22/18 (!) 165.2 kg (364 lb 4.8 oz)     Constitutional: Alert, oriented male in no visible distress.  Eyes: PERRL. Anicteric sclerae.  ENT/Mouth: OM moist and pink without lesions or thrush.  CV: RRR  Resp: CTAB throughout  Abdomen: Soft, non-tender, non-distended. Obese. Bowel sounds present. Unable to palpate liver or spleen.   Extremities: 2+ pitting edema with brawny induration  Skin: Warm, dry.   Lymph: No cervical or supraclavicular lymphadenopathy appreciated.   Neuro: CN II-XII grossly intact.  Absent reflexed at knees bilaterally    ECOG PS: 1    LABS:  CBC RESULTS:   Recent Labs   Lab Test  07/17/18   1250   WBC  3.9*   RBC  4.59   HGB  13.4   HCT  40.2   MCV  88   MCH  29.2   MCHC  33.3   RDW  15.1*   PLT  153     Alb 3.1    [facility] testing - MET amplification and KRAS amplification; results scanned in computer      IMAGING:  CT CAP 7/17/18  IMPRESSION: In this patient with esophageal cancer, there is evidence  of progression of disease:  1a. Increased size of the ill-defined mass which abuts the esophageal  junction and diaphragmatic andrey extending into the retroperitoneum and  central mesentery.  1b. Increased size of a presumably paraesophageal lymph node.  1c. Worsening retroperitoneal lymphadenopathy including two dominant  peripancreatic/periceliac nodes. Additional unchanged enlarged  mediastinal adenopathy.  3. Unchanged compression deformities of T9, T12, and L2.    IMPRESSION/PLAN:  1. Metastatic esophogeal adenocarcinoma. He has had treatment with FOLFOX and then transitioned to Taxol with " ramicurimab.  He had significant neuropathy with this treatment and did have slight progression during that time, however he has more recently progressed quite significantly on targeted therapy with crizotinib.  We will stop crizotinib and given the symptoms he is experiencing with difficulty swallowing, we will resume chemotherapy as he seems to have a chemosensitive cancer.  His [facility] testing did return with low positive PDL 1 status, indicating he could be a candidate for Keytruda, however given his symptoms want more rapid control.  We will plan to resume Taxol with Cyramza to start next week.  Will also look into cabozantinib insurance coverage.    Also discussed that he should continue to enjoy his time and complete any potential travel plans he has, possibly a trip to [place], in the near future given that he is currently feeling well.    2. Edema -  stable     3. He has a history of pulmonary embolism.  He is on Lovenox twice daily.  He has progressed through Xarelto in the past.       4. He does have baseline neuropathy.  Gabapentin is maintained at 600/300/600 mg daily.    Patient seen and discussed with attending physician, Dr. Dee.     Jeanne Bryan MD  Heme/Onc PGY6  07/17/2018    Pt was seen and evaluated by me with Dr Bryan.  I edited the above note to reflect my evaluation.  I reviewed Reuben's scans with he, his son and his sister.  We discussed that he has disease progression.  I would like him to go off of [facility] Match.      We discussed treatment options including the possibility of taxol/cyramza (this was discontinued due primarily to neuropathy), a trial of pembrolizumab (though his PDL1 expression is 1%) or possible clinical trial.  At this time, he would like to begin therapy quickly.  Will start taxol with cyramza and look into the other options.  We discussed the side effects of chemotherapy including myelosuppression, nausea/vomiting/diarrhea/constipation, hair loss,  neuropathy, fertility complications, dehydration, cardiomyopathy, etc.  The patient will be receiving chemotherapy teaching through my nurse coordinator with handouts describing all of the side effects again.  Consent for chemotherapy was obtained.    Sexual dysfunction - trial of cialis sent out to pharmacy.    He understands his therapies are not curative intent, but rather palliative.    Kadi Dee

## 2018-07-18 ENCOUNTER — TELEPHONE (OUTPATIENT)
Dept: ONCOLOGY | Facility: CLINIC | Age: 58
End: 2018-07-18

## 2018-07-18 DIAGNOSIS — C15.5 CANCER OF DISTAL THIRD OF ESOPHAGUS (H): Primary | ICD-10-CM

## 2018-07-18 RX ORDER — LATANOPROST 50 UG/ML
1 SOLUTION/ DROPS OPHTHALMIC AT BEDTIME
Qty: 1 BOTTLE | Refills: 0 | Status: SHIPPED | OUTPATIENT
Start: 2018-07-18

## 2018-07-18 NOTE — TELEPHONE ENCOUNTER
Per Patient's request,  completed and faxed [facility] referral for lodging dates 7/24 - 7/25 and 7/31 - 8/1. [facility] will contact Patient directly for confirmation of reservation.  will continue to provide support as needed.      Nieves Casper (Martens), [facility], MSW   - [facility]  Phone: [phone]  Pager: [phone]  7/18/2018

## 2018-07-18 NOTE — NURSING NOTE
[id number]: Study Visit Note   Subject name: Reuben Padilla     Visit: C5D1 supposed to be.  Ended up being EOT visit due to disease progression and needing to come off of the clinical trial.    Did the study visit occur within the appropriate window allowed by the protocol? yes    Since the last study visit, He has been doing well.  Only new complaint is patient is having difficultly swallowing for the past week now.       I have personally interviewed Reuben Padilla and reviewed his medical record for adverse events and concomitant medications and these have been recorded on the corresponding logs in Reuben Padilla's research file.     Reuben Padilla was given the opportunity to ask any trial related questions.  Please see provider progress note for physical exam and other clinical information. Labs were reviewed - any significant lab values were addressed and reviewed.  Patient will move to the follow up phase of the trial at this time.      Patient forgot to bring his pill diary and pills with him to this appointment.  Patient states that he will bring them with him to his next appointment.        Pippa Schultz RN     Pager [phone]

## 2018-07-19 RX ORDER — TADALAFIL 5 MG/1
10 TABLET ORAL EVERY 24 HOURS
Qty: 20 TABLET | Refills: 1 | Status: ON HOLD | OUTPATIENT
Start: 2018-07-19 | End: 2018-11-07

## 2018-07-20 ENCOUNTER — TELEPHONE (OUTPATIENT)
Dept: ONCOLOGY | Facility: CLINIC | Age: 58
End: 2018-07-20

## 2018-07-23 NOTE — TELEPHONE ENCOUNTER
Per Patient's request,  completed and faxed [facility] referral for lodging dates 8/6 - 8/8 and 8/13 - 8/15. [facility] will contact Patient directly for confirmation of reservation.  will continue to provide support as needed.      Nieves Casper (Martens), [facility], MSW   - [facility]  Phone: [phone]  Pager: [phone]  7/23/2018

## 2018-07-23 NOTE — TELEPHONE ENCOUNTER
PRIOR AUTHORIZATION DENIED    Medication: tadalafil (CIALIS) 5 MG tablet - DENIED    Denial Date: 7/20/2018    Denial Rational:          Appeal Information:

## 2018-07-24 ENCOUNTER — APPOINTMENT (OUTPATIENT)
Dept: LAB | Facility: CLINIC | Age: 58
End: 2018-07-24
Attending: INTERNAL MEDICINE
Payer: COMMERCIAL

## 2018-07-24 ENCOUNTER — INFUSION THERAPY VISIT (OUTPATIENT)
Dept: ONCOLOGY | Facility: CLINIC | Age: 58
End: 2018-07-24
Attending: INTERNAL MEDICINE
Payer: COMMERCIAL

## 2018-07-24 ENCOUNTER — RESEARCH ENCOUNTER (OUTPATIENT)
Dept: ONCOLOGY | Facility: CLINIC | Age: 58
End: 2018-07-24

## 2018-07-24 VITALS
HEART RATE: 59 BPM | DIASTOLIC BLOOD PRESSURE: 85 MMHG | WEIGHT: 315 LBS | TEMPERATURE: 98.4 F | OXYGEN SATURATION: 96 % | SYSTOLIC BLOOD PRESSURE: 127 MMHG | RESPIRATION RATE: 16 BRPM | BODY MASS INDEX: 41.6 KG/M2

## 2018-07-24 DIAGNOSIS — C15.5 CANCER OF DISTAL THIRD OF ESOPHAGUS (H): Primary | ICD-10-CM

## 2018-07-24 LAB
ALBUMIN SERPL-MCNC: 3.2 G/DL (ref 3.4–5)
ALBUMIN UR-MCNC: NEGATIVE MG/DL
ALP SERPL-CCNC: 96 U/L (ref 40–150)
ALT SERPL W P-5'-P-CCNC: 30 U/L (ref 0–70)
ANION GAP SERPL CALCULATED.3IONS-SCNC: 6 MMOL/L (ref 3–14)
AST SERPL W P-5'-P-CCNC: 22 U/L (ref 0–45)
BASOPHILS # BLD AUTO: 0 10E9/L (ref 0–0.2)
BASOPHILS NFR BLD AUTO: 0.3 %
BILIRUB SERPL-MCNC: 0.4 MG/DL (ref 0.2–1.3)
BUN SERPL-MCNC: 10 MG/DL (ref 7–30)
CALCIUM SERPL-MCNC: 8.4 MG/DL (ref 8.5–10.1)
CHLORIDE SERPL-SCNC: 108 MMOL/L (ref 94–109)
CO2 SERPL-SCNC: 26 MMOL/L (ref 20–32)
CREAT SERPL-MCNC: 0.91 MG/DL (ref 0.66–1.25)
DIFFERENTIAL METHOD BLD: ABNORMAL
EOSINOPHIL # BLD AUTO: 0 10E9/L (ref 0–0.7)
EOSINOPHIL NFR BLD AUTO: 1.1 %
ERYTHROCYTE [DISTWIDTH] IN BLOOD BY AUTOMATED COUNT: 14.6 % (ref 10–15)
GFR SERPL CREATININE-BSD FRML MDRD: 85 ML/MIN/1.7M2
GLUCOSE SERPL-MCNC: 100 MG/DL (ref 70–99)
HCT VFR BLD AUTO: 39 % (ref 40–53)
HGB BLD-MCNC: 12.7 G/DL (ref 13.3–17.7)
IMM GRANULOCYTES # BLD: 0 10E9/L (ref 0–0.4)
IMM GRANULOCYTES NFR BLD: 0.3 %
LYMPHOCYTES # BLD AUTO: 1 10E9/L (ref 0.8–5.3)
LYMPHOCYTES NFR BLD AUTO: 28.2 %
MCH RBC QN AUTO: 29.4 PG (ref 26.5–33)
MCHC RBC AUTO-ENTMCNC: 32.6 G/DL (ref 31.5–36.5)
MCV RBC AUTO: 90 FL (ref 78–100)
MONOCYTES # BLD AUTO: 0.2 10E9/L (ref 0–1.3)
MONOCYTES NFR BLD AUTO: 6.9 %
NEUTROPHILS # BLD AUTO: 2.2 10E9/L (ref 1.6–8.3)
NEUTROPHILS NFR BLD AUTO: 63.2 %
NRBC # BLD AUTO: 0 10*3/UL
NRBC BLD AUTO-RTO: 0 /100
PLATELET # BLD AUTO: 146 10E9/L (ref 150–450)
POTASSIUM SERPL-SCNC: 4.2 MMOL/L (ref 3.4–5.3)
PROT SERPL-MCNC: 7.4 G/DL (ref 6.8–8.8)
RBC # BLD AUTO: 4.32 10E12/L (ref 4.4–5.9)
SODIUM SERPL-SCNC: 140 MMOL/L (ref 133–144)
WBC # BLD AUTO: 3.5 10E9/L (ref 4–11)

## 2018-07-24 PROCEDURE — 85025 COMPLETE CBC W/AUTO DIFF WBC: CPT | Performed by: INTERNAL MEDICINE

## 2018-07-24 PROCEDURE — 25000128 H RX IP 250 OP 636: Mod: ZF | Performed by: INTERNAL MEDICINE

## 2018-07-24 PROCEDURE — 80053 COMPREHEN METABOLIC PANEL: CPT | Performed by: INTERNAL MEDICINE

## 2018-07-24 PROCEDURE — 25000125 ZZHC RX 250: Mod: ZF | Performed by: INTERNAL MEDICINE

## 2018-07-24 PROCEDURE — 96413 CHEMO IV INFUSION 1 HR: CPT

## 2018-07-24 PROCEDURE — 25000132 ZZH RX MED GY IP 250 OP 250 PS 637: Mod: ZF | Performed by: INTERNAL MEDICINE

## 2018-07-24 PROCEDURE — 96375 TX/PRO/DX INJ NEW DRUG ADDON: CPT

## 2018-07-24 PROCEDURE — 81003 URINALYSIS AUTO W/O SCOPE: CPT | Performed by: INTERNAL MEDICINE

## 2018-07-24 PROCEDURE — 96417 CHEMO IV INFUS EACH ADDL SEQ: CPT

## 2018-07-24 PROCEDURE — 96367 TX/PROPH/DG ADDL SEQ IV INF: CPT

## 2018-07-24 RX ORDER — MEPERIDINE HYDROCHLORIDE 25 MG/ML
25 INJECTION INTRAMUSCULAR; INTRAVENOUS; SUBCUTANEOUS EVERY 30 MIN PRN
Status: CANCELLED | OUTPATIENT
Start: 2018-07-24

## 2018-07-24 RX ORDER — DIPHENHYDRAMINE HCL 25 MG
50 CAPSULE ORAL ONCE
Status: COMPLETED | OUTPATIENT
Start: 2018-07-24 | End: 2018-07-24

## 2018-07-24 RX ORDER — METHYLPREDNISOLONE SODIUM SUCCINATE 125 MG/2ML
125 INJECTION, POWDER, LYOPHILIZED, FOR SOLUTION INTRAMUSCULAR; INTRAVENOUS
Status: CANCELLED
Start: 2018-07-24

## 2018-07-24 RX ORDER — ALBUTEROL SULFATE 90 UG/1
1-2 AEROSOL, METERED RESPIRATORY (INHALATION)
Status: CANCELLED
Start: 2018-07-31

## 2018-07-24 RX ORDER — DIPHENHYDRAMINE HYDROCHLORIDE 50 MG/ML
50 INJECTION INTRAMUSCULAR; INTRAVENOUS
Status: CANCELLED
Start: 2018-07-31

## 2018-07-24 RX ORDER — DIPHENHYDRAMINE HCL 25 MG
50 CAPSULE ORAL ONCE
Status: CANCELLED
Start: 2018-07-24

## 2018-07-24 RX ORDER — DIPHENHYDRAMINE HYDROCHLORIDE 50 MG/ML
50 INJECTION INTRAMUSCULAR; INTRAVENOUS
Status: CANCELLED
Start: 2018-08-07

## 2018-07-24 RX ORDER — EPINEPHRINE 0.3 MG/.3ML
0.3 INJECTION SUBCUTANEOUS EVERY 5 MIN PRN
Status: CANCELLED | OUTPATIENT
Start: 2018-07-24

## 2018-07-24 RX ORDER — MEPERIDINE HYDROCHLORIDE 25 MG/ML
25 INJECTION INTRAMUSCULAR; INTRAVENOUS; SUBCUTANEOUS EVERY 30 MIN PRN
Status: CANCELLED | OUTPATIENT
Start: 2018-08-07

## 2018-07-24 RX ORDER — MEPERIDINE HYDROCHLORIDE 25 MG/ML
25 INJECTION INTRAMUSCULAR; INTRAVENOUS; SUBCUTANEOUS EVERY 30 MIN PRN
Status: CANCELLED | OUTPATIENT
Start: 2018-07-31

## 2018-07-24 RX ORDER — LORAZEPAM 2 MG/ML
0.5 INJECTION INTRAMUSCULAR EVERY 4 HOURS PRN
Status: CANCELLED
Start: 2018-07-24

## 2018-07-24 RX ORDER — LORAZEPAM 2 MG/ML
0.5 INJECTION INTRAMUSCULAR EVERY 4 HOURS PRN
Status: CANCELLED
Start: 2018-08-07

## 2018-07-24 RX ORDER — SODIUM CHLORIDE 9 MG/ML
1000 INJECTION, SOLUTION INTRAVENOUS CONTINUOUS PRN
Status: CANCELLED
Start: 2018-07-24

## 2018-07-24 RX ORDER — EPINEPHRINE 1 MG/ML
0.3 INJECTION, SOLUTION INTRAMUSCULAR; SUBCUTANEOUS EVERY 5 MIN PRN
Status: CANCELLED | OUTPATIENT
Start: 2018-08-07

## 2018-07-24 RX ORDER — ALBUTEROL SULFATE 0.83 MG/ML
2.5 SOLUTION RESPIRATORY (INHALATION)
Status: CANCELLED | OUTPATIENT
Start: 2018-08-07

## 2018-07-24 RX ORDER — METHYLPREDNISOLONE SODIUM SUCCINATE 125 MG/2ML
125 INJECTION, POWDER, LYOPHILIZED, FOR SOLUTION INTRAMUSCULAR; INTRAVENOUS
Status: CANCELLED
Start: 2018-07-31

## 2018-07-24 RX ORDER — SODIUM CHLORIDE 9 MG/ML
1000 INJECTION, SOLUTION INTRAVENOUS CONTINUOUS PRN
Status: CANCELLED
Start: 2018-07-31

## 2018-07-24 RX ORDER — ALBUTEROL SULFATE 0.83 MG/ML
2.5 SOLUTION RESPIRATORY (INHALATION)
Status: CANCELLED | OUTPATIENT
Start: 2018-07-24

## 2018-07-24 RX ORDER — DIPHENHYDRAMINE HYDROCHLORIDE 50 MG/ML
50 INJECTION INTRAMUSCULAR; INTRAVENOUS
Status: CANCELLED
Start: 2018-07-24

## 2018-07-24 RX ORDER — METHYLPREDNISOLONE SODIUM SUCCINATE 125 MG/2ML
125 INJECTION, POWDER, LYOPHILIZED, FOR SOLUTION INTRAMUSCULAR; INTRAVENOUS
Status: CANCELLED
Start: 2018-08-07

## 2018-07-24 RX ORDER — DIPHENHYDRAMINE HCL 25 MG
50 CAPSULE ORAL ONCE
Status: CANCELLED
Start: 2018-07-31

## 2018-07-24 RX ORDER — HEPARIN SODIUM (PORCINE) LOCK FLUSH IV SOLN 100 UNIT/ML 100 UNIT/ML
500 SOLUTION INTRAVENOUS EVERY 8 HOURS
Status: DISCONTINUED | OUTPATIENT
Start: 2018-07-24 | End: 2018-07-24 | Stop reason: HOSPADM

## 2018-07-24 RX ORDER — HEPARIN SODIUM (PORCINE) LOCK FLUSH IV SOLN 100 UNIT/ML 100 UNIT/ML
500 SOLUTION INTRAVENOUS DAILY PRN
Status: DISCONTINUED | OUTPATIENT
Start: 2018-07-24 | End: 2018-07-24 | Stop reason: HOSPADM

## 2018-07-24 RX ORDER — EPINEPHRINE 0.3 MG/.3ML
0.3 INJECTION SUBCUTANEOUS EVERY 5 MIN PRN
Status: CANCELLED | OUTPATIENT
Start: 2018-07-31

## 2018-07-24 RX ORDER — SODIUM CHLORIDE 9 MG/ML
1000 INJECTION, SOLUTION INTRAVENOUS CONTINUOUS PRN
Status: CANCELLED
Start: 2018-08-07

## 2018-07-24 RX ORDER — DIPHENHYDRAMINE HCL 25 MG
50 CAPSULE ORAL ONCE
Status: CANCELLED
Start: 2018-08-07

## 2018-07-24 RX ORDER — ALBUTEROL SULFATE 90 UG/1
1-2 AEROSOL, METERED RESPIRATORY (INHALATION)
Status: CANCELLED
Start: 2018-08-07

## 2018-07-24 RX ORDER — EPINEPHRINE 1 MG/ML
0.3 INJECTION, SOLUTION INTRAMUSCULAR; SUBCUTANEOUS EVERY 5 MIN PRN
Status: CANCELLED | OUTPATIENT
Start: 2018-07-24

## 2018-07-24 RX ORDER — PROCHLORPERAZINE MALEATE 10 MG
10 TABLET ORAL EVERY 6 HOURS PRN
Qty: 30 TABLET | Refills: 2 | Status: SHIPPED | OUTPATIENT
Start: 2018-07-24 | End: 2018-10-23

## 2018-07-24 RX ORDER — LORAZEPAM 2 MG/ML
0.5 INJECTION INTRAMUSCULAR EVERY 4 HOURS PRN
Status: CANCELLED
Start: 2018-07-31

## 2018-07-24 RX ORDER — EPINEPHRINE 0.3 MG/.3ML
0.3 INJECTION SUBCUTANEOUS EVERY 5 MIN PRN
Status: CANCELLED | OUTPATIENT
Start: 2018-08-07

## 2018-07-24 RX ORDER — ALBUTEROL SULFATE 0.83 MG/ML
2.5 SOLUTION RESPIRATORY (INHALATION)
Status: CANCELLED | OUTPATIENT
Start: 2018-07-31

## 2018-07-24 RX ORDER — EPINEPHRINE 1 MG/ML
0.3 INJECTION, SOLUTION INTRAMUSCULAR; SUBCUTANEOUS EVERY 5 MIN PRN
Status: CANCELLED | OUTPATIENT
Start: 2018-07-31

## 2018-07-24 RX ORDER — ALBUTEROL SULFATE 90 UG/1
1-2 AEROSOL, METERED RESPIRATORY (INHALATION)
Status: CANCELLED
Start: 2018-07-24

## 2018-07-24 RX ORDER — LORAZEPAM 0.5 MG/1
0.5 TABLET ORAL EVERY 4 HOURS PRN
Qty: 30 TABLET | Refills: 2 | Status: SHIPPED | OUTPATIENT
Start: 2018-07-24 | End: 2018-10-23

## 2018-07-24 RX ADMIN — DEXAMETHASONE SODIUM PHOSPHATE 20 MG: 10 INJECTION, SOLUTION INTRAMUSCULAR; INTRAVENOUS at 16:20

## 2018-07-24 RX ADMIN — SODIUM CHLORIDE 1300 MG: 9 INJECTION, SOLUTION INTRAVENOUS at 17:39

## 2018-07-24 RX ADMIN — SODIUM CHLORIDE, PRESERVATIVE FREE 500 UNITS: 5 INJECTION INTRAVENOUS at 15:15

## 2018-07-24 RX ADMIN — SODIUM CHLORIDE 250 ML: 9 INJECTION, SOLUTION INTRAVENOUS at 15:59

## 2018-07-24 RX ADMIN — DIPHENHYDRAMINE HYDROCHLORIDE 50 MG: 25 CAPSULE ORAL at 15:58

## 2018-07-24 RX ADMIN — FAMOTIDINE 20 MG: 20 INJECTION, SOLUTION INTRAVENOUS at 15:59

## 2018-07-24 RX ADMIN — PACLITAXEL 240 MG: 6 INJECTION, SOLUTION INTRAVENOUS at 16:37

## 2018-07-24 RX ADMIN — SODIUM CHLORIDE, PRESERVATIVE FREE 500 UNITS: 5 INJECTION INTRAVENOUS at 18:42

## 2018-07-24 ASSESSMENT — PAIN SCALES - GENERAL: PAINLEVEL: NO PAIN (0)

## 2018-07-24 NOTE — NURSING NOTE
[id number]: Medication Count/IDS Note      Reuben Padilla was enrolled on the trial number listed above.  Patient had forgot to bring medication and diary back with him on his last visit.    IDS number: [id number]  Drug name: Crizotinib  Number of bottles returned: 1  Lot number(s): [id number]  Number of pills returned: 5      Number of bottles dispensed:NA  Lot number(s):NA  Number of pills dispensed: NA    Drug diary returned? yes    Are there any discrepancies between the amount of medication the patient was instructed to take and the amount recorded as taken in the patient s drug diary?  No    Are there any discrepancies between the amount of medication the patient has recorded as taken in the drug diary and the amount that would be expected to be returned based on the amount recorded as taken?  no       Pippa Schultz RN     Pager [phone]        Form 504.00.01 (Version 1)     Effective date: 01JUL2018     Next Review Date: 01JUL2020

## 2018-07-24 NOTE — MR AVS SNAPSHOT
After Visit Summary   7/24/2018    Reuben Padilla    MRN: [id number]           Patient Information     Date Of Birth          1960        Visit Information        Provider Department      7/24/2018 3:30 PM UC 29 ATC;  ONCOLOGY INFUSION [facility]        Today's Diagnoses     Cancer of distal third of esophagus (H)    -  1      Care Instructions    Contact Numbers    [facility] Main Line: [phone]  [facility] Triage and after hours / weekends / holidays:  [phone]      Please call the triage or after hours line if you experience a temperature greater than or equal to 100.5, shaking chills, have uncontrolled nausea, vomiting and/or diarrhea, dizziness, shortness of breath, chest pain, bleeding, unexplained bruising, or if you have any other new/concerning symptoms, questions or concerns.      If you are having any concerning symptoms or wish to speak to a provider before your next infusion visit, please call your care coordinator or triage to notify them so we can adequately serve you.     If you need a refill on a narcotic prescription or other medication, please call before your infusion appointment.                   July 2018 Sunday Monday Tuesday Wednesday Thursday Friday Saturday   1     2     3     4     5     6     7       8     9     10     11     12     13     14       15     16     17     [facility] LAB DRAW   12:00 PM   (15 min.)    [facility]IC LAB DRAW   [facility] Lab Draw     CT CHEST/ABDOMEN/PELVIS W    1:00 PM   (20 min.)   UCCT2   [facility] CT     [facility] RETURN    3:45 PM   (30 min.)   Kadi Dee MD   [facility] 18     19     20     21       22     23     24     [facility] LAB DRAW    3:00 PM   (15 min.)    [facility] LAB DRAW   [facility] Lab Draw     [facility] ONC INFUSION 120    3:30 PM   (120 min.)    ONCOLOGY INFUSION   [facility] 25     26     27     28       29     30     31                                     August 2018 Sunday Monday Tuesday Wednesday Thursday Friday Saturday                  1     [facility] DRAW    9:45 AM   (15 min.)   [facility] LAB DRAW   [facility] Lab Draw     UMP RETURN   10:15 AM   (50 min.)   Amparo Sow PA   [facility]     UMP ONC INFUSION 120   11:30 AM   (120 min.)   UC ONCOLOGY INFUSION   [facility] 2     3     4       5     6     7     [facility] DRAW   10:15 AM   (15 min.)   [facility] DRAW   [facility] Lab Draw     UMP RETURN   10:45 AM   (30 min.)   Kadi Dee MD   [facility]     UMP ONC INFUSION 120   11:30 AM   (120 min.)   UC ONCOLOGY INFUSION   [facility] 8     9     10     11       12     13     14     [facility] DRAW    8:30 AM   (15 min.)   [facility] LAB DRAW   [facility] Lab Draw     [facility] ONC INFUSION 120    9:00 AM   (120 min.)   UC ONCOLOGY INFUSION   [facility] 15     16     17     18       19     20     21     22     23     24     25       26     27     28     29     30     31  Happy Birthday!                      Recent Results (from the past 24 hour(s))   Comprehensive metabolic panel    Collection Time: 07/24/18  3:16 PM   Result Value Ref Range    Sodium 140 133 - 144 mmol/L    Potassium 4.2 3.4 - 5.3 mmol/L    Chloride 108 94 - 109 mmol/L    Carbon Dioxide 26 20 - 32 mmol/L    Anion Gap 6 3 - 14 mmol/L    Glucose 100 (H) 70 - 99 mg/dL    Urea Nitrogen 10 7 - 30 mg/dL    Creatinine 0.91 0.66 - 1.25 mg/dL    GFR Estimate 85 >60 mL/min/1.7m2    GFR Estimate If Black >90 >60 mL/min/1.7m2    Calcium 8.4 (L) 8.5 - 10.1 mg/dL    Bilirubin Total 0.4 0.2 - 1.3 mg/dL    Albumin 3.2 (L) 3.4 - 5.0 g/dL    Protein Total 7.4 6.8 - 8.8 g/dL    Alkaline Phosphatase 96 40 - 150 U/L    ALT 30 0 - 70 U/L    AST 22 0 - 45 U/L   *CBC with platelets differential    Collection Time: 07/24/18  3:16 PM   Result Value Ref  Range    WBC 3.5 (L) 4.0 - 11.0 10e9/L    RBC Count 4.32 (L) 4.4 - 5.9 10e12/L    Hemoglobin 12.7 (L) 13.3 - 17.7 g/dL    Hematocrit 39.0 (L) 40.0 - 53.0 %    MCV 90 78 - 100 fl    MCH 29.4 26.5 - 33.0 pg    MCHC 32.6 31.5 - 36.5 g/dL    RDW 14.6 10.0 - 15.0 %    Platelet Count 146 (L) 150 - 450 10e9/L    Diff Method Automated Method     % Neutrophils 63.2 %    % Lymphocytes 28.2 %    % Monocytes 6.9 %    % Eosinophils 1.1 %    % Basophils 0.3 %    % Immature Granulocytes 0.3 %    Nucleated RBCs 0 0 /100    Absolute Neutrophil 2.2 1.6 - 8.3 10e9/L    Absolute Lymphocytes 1.0 0.8 - 5.3 10e9/L    Absolute Monocytes 0.2 0.0 - 1.3 10e9/L    Absolute Eosinophils 0.0 0.0 - 0.7 10e9/L    Absolute Basophils 0.0 0.0 - 0.2 10e9/L    Abs Immature Granulocytes 0.0 0 - 0.4 10e9/L    Absolute Nucleated RBC 0.0    Protein qualitative urine    Collection Time: 07/24/18  4:30 PM   Result Value Ref Range    Protein Albumin Urine Negative NEG^Negative mg/dL               Follow-ups after your visit        Your next 10 appointments already scheduled     Aug 01, 2018  9:45 AM CDT   [facility] Lab Draw with UC [facility] LAB DRAW   [facility] Lab Draw ([facility])    [address] [place] [address]  [address]  [facility] [id number] [phone]            Aug 01, 2018 10:30 AM CDT   (Arrive by 10:15 AM)   Return Visit with SERGIO Saleh   [facility] ([facility])    [address] [place] [address]  [address]  [facility] [id number] [phone]            Aug 01, 2018 11:30 AM CDT   Infusion 120 with UC ONCOLOGY INFUSION, UC 21 ATC   [facility] ([facility])    [address] [place] [address]  [address]  [facility] [id number] [phone]            Aug 07, 2018 10:15 AM CDT   M[facility]ic Lab Draw with  MASONIC LAB DRAW   [facility] Lab Draw ([facility])    [address]  [id number]   [phone]            Aug 07, 2018 11:00 AM CDT   (Arrive by 10:45 AM)   Return Visit with Kadi Dee MD   [facility] ([facility])    [address] [place] [address]  [address]  [facility] [address]   [phone]            Aug 07, 2018 11:30 AM CDT   Infusion 120 with UC ONCOLOGY INFUSION, UC 15 ATC   [facility] ([facility])    [address] [place] [address]  [address]  [facility] [address]   [phone]            Aug 14, 2018  8:30 AM CDT   [facility] Lab Draw with UC [facility] LAB DRAW   [facility] Lab Draw ([facility])    [address] [place] [address]  [address]  [facility] [address]   [phone]            Aug 14, 2018  9:00 AM CDT   Infusion 120 with UC ONCOLOGY INFUSION   [facility] ([facility])    [address] [place] [address]  [address]  [facility] [address]   [phone]              Who to contact     If you have questions or need follow up information about today's clinic visit or your schedule please contact [facility] directly at [phone].  Normal or non-critical lab and imaging results will be communicated to you by MyChart, letter or phone within 4 business days after the clinic has received the results. If you do not hear from us within 7 days, please contact the clinic through MyChart or phone. If you have a critical or abnormal lab result, we will notify you by phone as soon as possible.  Submit refill requests through [employer] or call your pharmacy and they will forward the refill request to us. Please allow 3 business days for your refill to be completed.          Additional Information About Your Visit        [employer] Information     [employer] gives you secure access to your electronic health record. If you see a primary care provider, you can also send messages to your care team and make appointments. If  you have questions, please call your primary care clinic.  If you do not have a primary care provider, please call [phone] and they will assist you.        Care EveryWhere ID     This is your Care EveryWhere ID. This could be used by other organizations to access your [place] medical records  [id number]        Your Vitals Were     Pulse Temperature Respirations Pulse Oximetry BMI (Body Mass Index)       59 98.4  F (36.9  C) (Oral) 16 96% 41.6 kg/m2        Blood Pressure from Last 3 Encounters:   07/24/18 127/85   07/17/18 113/70   06/20/18 126/81    Weight from Last 3 Encounters:   07/24/18 (!) 163.3 kg (360 lb)   07/17/18 (!) 163.3 kg (360 lb)   06/20/18 (!) 165.2 kg (364 lb 2 oz)              We Performed the Following     *CBC with platelets differential     Comprehensive metabolic panel     Protein qualitative urine          Today's Medication Changes          These changes are accurate as of 7/24/18  6:45 PM.  If you have any questions, ask your nurse or doctor.               These medicines have changed or have updated prescriptions.        Dose/Directions    * LORazepam 0.5 MG tablet   Commonly known as:  ATIVAN   This may have changed:  Another medication with the same name was added. Make sure you understand how and when to take each.   Used for:  Cancer of distal third of esophagus (H)        Dose:  0.5 mg   Take 1 tablet (0.5 mg) by mouth every 4 hours as needed (Anxiety, Nausea/Vomiting or Sleep)   Quantity:  30 tablet   Refills:  2       * LORazepam 0.5 MG tablet   Commonly known as:  ATIVAN   This may have changed:  You were already taking a medication with the same name, and this prescription was added. Make sure you understand how and when to take each.   Used for:  Cancer of distal third of esophagus (H)        Dose:  0.5 mg   Take 1 tablet (0.5 mg) by mouth every 4 hours as needed (Anxiety, Nausea/Vomiting or Sleep)   Quantity:  30 tablet   Refills:  2       * prochlorperazine 10 MG tablet    Commonly known as:  COMPAZINE   This may have changed:  Another medication with the same name was added. Make sure you understand how and when to take each.   Used for:  Cancer of distal third of esophagus (H)        Dose:  10 mg   Take 1 tablet (10 mg) by mouth every 6 hours as needed (Nausea/Vomiting)   Quantity:  30 tablet   Refills:  2       * prochlorperazine 10 MG tablet   Commonly known as:  COMPAZINE   This may have changed:  You were already taking a medication with the same name, and this prescription was added. Make sure you understand how and when to take each.   Used for:  Cancer of distal third of esophagus (H)        Dose:  10 mg   Take 1 tablet (10 mg) by mouth every 6 hours as needed (Nausea/Vomiting)   Quantity:  30 tablet   Refills:  2       * Notice:  This list has 4 medication(s) that are the same as other medications prescribed for you. Read the directions carefully, and ask your doctor or other care provider to review them with you.         Where to get your medicines      These medications were sent to [address] [place] [address] [address] [place] [address]    Hours:  TRANSPLANT PHONE NUMBER [phone] Phone:  [phone]     prochlorperazine 10 MG tablet         Some of these will need a paper prescription and others can be bought over the counter.  Ask your nurse if you have questions.     Bring a paper prescription for each of these medications     LORazepam 0.5 MG tablet                Primary Care Provider Fax #    Physician No Ref-Primary [phone]       No address on file        Equal Access to Services     OLLIE SHARPE AH: Eliza uDnlap, waaxda luqadaha, qaybta kaalmada adelaurynyada, geraldo cardenas. So [facility] [phone].    ATENCIÓN: Si habla español, tiene a aalmo disposición servicios gratuitos de asistencia lingüística. Llame al [phone].    We comply with  applicable federal civil rights laws and [place] laws. We do not discriminate on the basis of race, color, national origin, age, disability, sex, sexual orientation, or gender identity.            Thank you!     Thank you for choosing [facility]  for your care. Our goal is always to provide you with excellent care. Hearing back from our patients is one way we can continue to improve our services. Please take a few minutes to complete the written survey that you may receive in the mail after your visit with us. Thank you!             Your Updated Medication List - Protect others around you: Learn how to safely use, store and throw away your medicines at www.disposemymeds.org.          This list is accurate as of 7/24/18  6:45 PM.  Always use your most recent med list.                   Brand Name Dispense Instructions for use Diagnosis    diphenoxylate-atropine 2.5-0.025 MG per tablet    LOMOTIL    40 tablet    Take 1 tablet by mouth 4 times daily as needed for diarrhea    Diarrhea, unspecified type       * enoxaparin 80 MG/0.8ML injection    LOVENOX    60 Syringe    Inject 1.3 mLs (130 mg) Subcutaneous 2 times daily    Cancer of distal third of esophagus (H)       * enoxaparin 150 MG/ML injection    LOVENOX     INJECT 0.86ML (130MG) TWICE A DAY FOR ACUTE PULMONARY EMBOLISM AND ESOPHAGEAL MALIGNANCY        fish oil-omega-3 fatty acids 1000 MG capsule      Take 2 g by mouth daily        furosemide 20 MG tablet    LASIX    30 tablet    Take 1 tablet (20 mg) by mouth daily    Cancer of distal third of esophagus (H), Generalized edema       gabapentin 300 MG capsule    NEURONTIN    180 capsule    Two tablets in the am and one tablet in the afternoon and evening    Chemotherapy-induced neuropathy (H)       latanoprost 0.005 % ophthalmic solution    XALATAN    1 Bottle    Place 1 drop into both eyes At Bedtime    Cancer of distal third of esophagus (H)       lidocaine-prilocaine cream    EMLA    30 g     Apply topically as needed for moderate pain    Cancer of distal third of esophagus (H)       * LORazepam 0.5 MG tablet    ATIVAN    30 tablet    Take 1 tablet (0.5 mg) by mouth every 4 hours as needed (Anxiety, Nausea/Vomiting or Sleep)    Cancer of distal third of esophagus (H)       * LORazepam 0.5 MG tablet    ATIVAN    30 tablet    Take 1 tablet (0.5 mg) by mouth every 4 hours as needed (Anxiety, Nausea/Vomiting or Sleep)    Cancer of distal third of esophagus (H)       Multi-vitamin Tabs tablet      Take 1 tablet by mouth daily        omeprazole 40 MG capsule    priLOSEC    90 capsule    Take 1 capsule (40 mg) by mouth daily    Cancer of distal third of esophagus (H)       ondansetron 8 MG tablet    ZOFRAN    30 tablet    Take 1 tablet (8 mg) by mouth every 8 hours as needed (Nausea/Vomiting)    Cancer of distal third of esophagus (H)       * prochlorperazine 10 MG tablet    COMPAZINE    30 tablet    Take 1 tablet (10 mg) by mouth every 6 hours as needed (Nausea/Vomiting)    Cancer of distal third of esophagus (H)       * prochlorperazine 10 MG tablet    COMPAZINE    30 tablet    Take 1 tablet (10 mg) by mouth every 6 hours as needed (Nausea/Vomiting)    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 Read the directions carefully, and ask your doctor or other care provider to review them with you.

## 2018-07-24 NOTE — NURSING NOTE
Chief Complaint   Patient presents with     Port Draw       Vitals taken, port accessed.  Labs collected from port.  Line flushed with NS and Heparin.  Pt tolerated procedure.  Checked in for next appt.    Delfina Rosales RN

## 2018-07-24 NOTE — PATIENT INSTRUCTIONS
Contact Numbers    [facility] Main Line: [phone]  [facility] Triage and after hours / weekends / holidays:  [phone]      Please call the triage or after hours line if you experience a temperature greater than or equal to 100.5, shaking chills, have uncontrolled nausea, vomiting and/or diarrhea, dizziness, shortness of breath, chest pain, bleeding, unexplained bruising, or if you have any other new/concerning symptoms, questions or concerns.      If you are having any concerning symptoms or wish to speak to a provider before your next infusion visit, please call your care coordinator or triage to notify them so we can adequately serve you.     If you need a refill on a narcotic prescription or other medication, please call before your infusion appointment.                   July 2018 Sunday Monday Tuesday Wednesday Thursday Friday Saturday   1     2     3     4     5     6     7       8     9     10     11     12     13     14       15     16     17     [facility] DRAW   12:00 PM   (15 min.)    MASONIC LAB DRAW   [facility] Lab Draw     CT CHEST/ABDOMEN/PELVIS W    1:00 PM   (20 min.)   UCCT2   [facility] CT     [facility] RETURN    3:45 PM   (30 min.)   Kadi Dee MD   [facility] 18     19     20     21       22     23     24     [facility] DRAW    3:00 PM   (15 min.)    MASONIC LAB DRAW   [facility] Lab Draw     UMP ONC INFUSION 120    3:30 PM   (120 min.)    ONCOLOGY INFUSION   [facility] 25     26     27     28       29     30     31 August 2018 Sunday Monday Tuesday Wednesday Thursday Friday Saturday                  1     [facility] DRAW    9:45 AM   (15 min.)    [facility] LAB DRAW   [facility] Lab Draw     [facility] RETURN   10:15 AM   (50 min.)   Amparo Sow PA   [facility]     [facility] ONC INFUSION 120   11:30 AM   (120 min.)    ONCOLOGY INFUSION   [facility] 2     3     4       5     6     7     [facility] LAB DRAW   10:15 AM   (15 min.)   UC [facility] LAB DRAW   [facility] Lab Draw     UMP RETURN   10:45 AM   (30 min.)   Kadi Dee MD   [facility] ONC INFUSION 120   11:30 AM   (120 min.)    ONCOLOGY INFUSION   [facility] 8     9     10     11       12     13     14     [facility] LAB DRAW    8:30 AM   (15 min.)   UC [facility] LAB DRAW   [facility] Lab Draw     [facility] ONC INFUSION 120    9:00 AM   (120 min.)    ONCOLOGY INFUSION   [facility] 15     16     17     18       19     20     21     22     23     24     25       26     27     28     29     30     31  Happy Birthday!                      Recent Results (from the past 24 hour(s))   Comprehensive metabolic panel    Collection Time: 07/24/18  3:16 PM   Result Value Ref Range    Sodium 140 133 - 144 mmol/L    Potassium 4.2 3.4 - 5.3 mmol/L    Chloride 108 94 - 109 mmol/L    Carbon Dioxide 26 20 - 32 mmol/L    Anion Gap 6 3 - 14 mmol/L    Glucose 100 (H) 70 - 99 mg/dL    Urea Nitrogen 10 7 - 30 mg/dL    Creatinine 0.91 0.66 - 1.25 mg/dL    GFR Estimate 85 >60 mL/min/1.7m2    GFR Estimate If Black >90 >60 mL/min/1.7m2    Calcium 8.4 (L) 8.5 - 10.1 mg/dL    Bilirubin Total 0.4 0.2 - 1.3 mg/dL    Albumin 3.2 (L) 3.4 - 5.0 g/dL    Protein Total 7.4 6.8 - 8.8 g/dL    Alkaline Phosphatase 96 40 - 150 U/L    ALT 30 0 - 70 U/L    AST 22 0 - 45 U/L   *CBC with platelets differential    Collection Time: 07/24/18  3:16 PM   Result Value Ref Range    WBC 3.5 (L) 4.0 - 11.0 10e9/L    RBC Count 4.32 (L) 4.4 - 5.9 10e12/L    Hemoglobin 12.7 (L) 13.3 - 17.7 g/dL    Hematocrit 39.0 (L) 40.0 - 53.0 %    MCV 90 78 - 100 fl    MCH 29.4 26.5 - 33.0 pg    MCHC 32.6 31.5 - 36.5 g/dL    RDW 14.6 10.0 - 15.0 %    Platelet Count 146 (L) 150 - 450 10e9/L    Diff Method Automated Method     % Neutrophils 63.2 %    % Lymphocytes 28.2 %     % Monocytes 6.9 %    % Eosinophils 1.1 %    % Basophils 0.3 %    % Immature Granulocytes 0.3 %    Nucleated RBCs 0 0 /100    Absolute Neutrophil 2.2 1.6 - 8.3 10e9/L    Absolute Lymphocytes 1.0 0.8 - 5.3 10e9/L    Absolute Monocytes 0.2 0.0 - 1.3 10e9/L    Absolute Eosinophils 0.0 0.0 - 0.7 10e9/L    Absolute Basophils 0.0 0.0 - 0.2 10e9/L    Abs Immature Granulocytes 0.0 0 - 0.4 10e9/L    Absolute Nucleated RBC 0.0    Protein qualitative urine    Collection Time: 07/24/18  4:30 PM   Result Value Ref Range    Protein Albumin Urine Negative NEG^Negative mg/dL

## 2018-07-24 NOTE — PROGRESS NOTES
Infusion Nursing Note:    Patient presents today for Cycle 1 Day 1 Cyramza, Taxol.  Had last in February.     Lab Results   Component Value Date    HGB 12.7 07/24/2018     Lab Results   Component Value Date    WBC 3.5 07/24/2018      Lab Results   Component Value Date    ANEU 2.2 07/24/2018     Lab Results   Component Value Date     07/24/2018      Lab Results   Component Value Date     07/24/2018                   Lab Results   Component Value Date    POTASSIUM 4.2 07/24/2018           Lab Results   Component Value Date    MAG 1.9 07/17/2018            Lab Results   Component Value Date    CR 0.91 07/24/2018                   Lab Results   Component Value Date    NIDHI 8.4 07/24/2018                Lab Results   Component Value Date    BILITOTAL 0.4 07/24/2018           Lab Results   Component Value Date    ALBUMIN 3.2 07/24/2018                    Lab Results   Component Value Date    ALT 30 07/24/2018           Lab Results   Component Value Date    AST 22 07/24/2018       Results reviewed, labs MET treatment parameters, ok to proceed with treatment.  Urine protein: negative.  Blood pressure: 127/85    Note: Pt reports feeling well, no new concerns to report since seeing Dr. Dee. Familiar with restarting this chemo regimen, last had in February.     Intravenous Access:  Implanted Port.    Post Infusion Assessment:  Patient tolerated infusion without incident.  Blood return noted pre and post infusion.  Access discontinued per protocol.    Discharge Plan:   Patient declined prescription refills.  Discharge instructions reviewed with: Patient.  AVS to patient via [employer].  Patient will return 8/1 for next appointment.   Patient discharged in stable condition accompanied by: self.  Departure Mode: Ambulatory.

## 2018-07-25 ENCOUNTER — CARE COORDINATION (OUTPATIENT)
Dept: ONCOLOGY | Facility: CLINIC | Age: 58
End: 2018-07-25

## 2018-07-25 NOTE — PROGRESS NOTES
Placed call to patient for weekly follow up. Patient states infusion went well and he is feeling fairly well. Patient does request for LMN be written to help with coverage of Cialis. Message sent to Dr. Dee for assistance. At this time patient has no other concerns. He knows to call clinic should anything arise is the meantime.

## 2018-08-01 ENCOUNTER — APPOINTMENT (OUTPATIENT)
Dept: LAB | Facility: CLINIC | Age: 58
End: 2018-08-01
Attending: INTERNAL MEDICINE
Payer: COMMERCIAL

## 2018-08-01 ENCOUNTER — TELEPHONE (OUTPATIENT)
Dept: ONCOLOGY | Facility: CLINIC | Age: 58
End: 2018-08-01

## 2018-08-01 ENCOUNTER — INFUSION THERAPY VISIT (OUTPATIENT)
Dept: ONCOLOGY | Facility: CLINIC | Age: 58
End: 2018-08-01
Attending: INTERNAL MEDICINE
Payer: COMMERCIAL

## 2018-08-01 VITALS
OXYGEN SATURATION: 97 % | DIASTOLIC BLOOD PRESSURE: 85 MMHG | HEIGHT: 78 IN | WEIGHT: 315 LBS | HEART RATE: 63 BPM | TEMPERATURE: 97.7 F | RESPIRATION RATE: 18 BRPM | SYSTOLIC BLOOD PRESSURE: 127 MMHG | BODY MASS INDEX: 36.45 KG/M2

## 2018-08-01 DIAGNOSIS — C15.5 CANCER OF DISTAL THIRD OF ESOPHAGUS (H): ICD-10-CM

## 2018-08-01 DIAGNOSIS — C15.5 CANCER OF DISTAL THIRD OF ESOPHAGUS (H): Primary | ICD-10-CM

## 2018-08-01 LAB
BASOPHILS # BLD AUTO: 0 10E9/L (ref 0–0.2)
BASOPHILS NFR BLD AUTO: 0.4 %
DIFFERENTIAL METHOD BLD: ABNORMAL
EOSINOPHIL # BLD AUTO: 0 10E9/L (ref 0–0.7)
EOSINOPHIL NFR BLD AUTO: 0.7 %
ERYTHROCYTE [DISTWIDTH] IN BLOOD BY AUTOMATED COUNT: 14.1 % (ref 10–15)
HCT VFR BLD AUTO: 40 % (ref 40–53)
HGB BLD-MCNC: 12.8 G/DL (ref 13.3–17.7)
IMM GRANULOCYTES # BLD: 0 10E9/L (ref 0–0.4)
IMM GRANULOCYTES NFR BLD: 0.7 %
LYMPHOCYTES # BLD AUTO: 0.8 10E9/L (ref 0.8–5.3)
LYMPHOCYTES NFR BLD AUTO: 29.7 %
MCH RBC QN AUTO: 28.8 PG (ref 26.5–33)
MCHC RBC AUTO-ENTMCNC: 32 G/DL (ref 31.5–36.5)
MCV RBC AUTO: 90 FL (ref 78–100)
MONOCYTES # BLD AUTO: 0.1 10E9/L (ref 0–1.3)
MONOCYTES NFR BLD AUTO: 4.4 %
NEUTROPHILS # BLD AUTO: 1.8 10E9/L (ref 1.6–8.3)
NEUTROPHILS NFR BLD AUTO: 64.1 %
NRBC # BLD AUTO: 0 10*3/UL
NRBC BLD AUTO-RTO: 0 /100
PLATELET # BLD AUTO: 146 10E9/L (ref 150–450)
RBC # BLD AUTO: 4.44 10E12/L (ref 4.4–5.9)
WBC # BLD AUTO: 2.7 10E9/L (ref 4–11)

## 2018-08-01 PROCEDURE — 99214 OFFICE O/P EST MOD 30 MIN: CPT | Mod: ZP | Performed by: PHYSICIAN ASSISTANT

## 2018-08-01 PROCEDURE — 25000128 H RX IP 250 OP 636: Mod: ZF | Performed by: INTERNAL MEDICINE

## 2018-08-01 PROCEDURE — 25000125 ZZHC RX 250: Mod: ZF | Performed by: INTERNAL MEDICINE

## 2018-08-01 PROCEDURE — 96375 TX/PRO/DX INJ NEW DRUG ADDON: CPT

## 2018-08-01 PROCEDURE — G0463 HOSPITAL OUTPT CLINIC VISIT: HCPCS | Mod: ZF

## 2018-08-01 PROCEDURE — 25000132 ZZH RX MED GY IP 250 OP 250 PS 637: Mod: ZF | Performed by: INTERNAL MEDICINE

## 2018-08-01 PROCEDURE — 96413 CHEMO IV INFUSION 1 HR: CPT

## 2018-08-01 PROCEDURE — 85025 COMPLETE CBC W/AUTO DIFF WBC: CPT | Performed by: INTERNAL MEDICINE

## 2018-08-01 PROCEDURE — 25000128 H RX IP 250 OP 636: Mod: ZF | Performed by: PHYSICIAN ASSISTANT

## 2018-08-01 RX ORDER — HEPARIN SODIUM (PORCINE) LOCK FLUSH IV SOLN 100 UNIT/ML 100 UNIT/ML
500 SOLUTION INTRAVENOUS EVERY 8 HOURS
Status: DISCONTINUED | OUTPATIENT
Start: 2018-08-01 | End: 2018-08-01 | Stop reason: HOSPADM

## 2018-08-01 RX ORDER — DIPHENHYDRAMINE HCL 25 MG
50 CAPSULE ORAL ONCE
Status: COMPLETED | OUTPATIENT
Start: 2018-08-01 | End: 2018-08-01

## 2018-08-01 RX ORDER — HEPARIN SODIUM (PORCINE) LOCK FLUSH IV SOLN 100 UNIT/ML 100 UNIT/ML
5 SOLUTION INTRAVENOUS EVERY 8 HOURS PRN
Status: DISCONTINUED | OUTPATIENT
Start: 2018-08-01 | End: 2018-08-01 | Stop reason: HOSPADM

## 2018-08-01 RX ORDER — HEPARIN SODIUM (PORCINE) LOCK FLUSH IV SOLN 100 UNIT/ML 100 UNIT/ML
500 SOLUTION INTRAVENOUS EVERY 8 HOURS
Status: CANCELLED
Start: 2018-08-01

## 2018-08-01 RX ADMIN — PACLITAXEL 240 MG: 6 INJECTION, SOLUTION INTRAVENOUS at 12:16

## 2018-08-01 RX ADMIN — FAMOTIDINE 20 MG: 20 INJECTION, SOLUTION INTRAVENOUS at 11:54

## 2018-08-01 RX ADMIN — DEXAMETHASONE SODIUM PHOSPHATE 20 MG: 10 INJECTION, SOLUTION INTRAMUSCULAR; INTRAVENOUS at 11:32

## 2018-08-01 RX ADMIN — SODIUM CHLORIDE, PRESERVATIVE FREE 5 ML: 5 INJECTION INTRAVENOUS at 10:23

## 2018-08-01 RX ADMIN — SODIUM CHLORIDE, PRESERVATIVE FREE 500 UNITS: 5 INJECTION INTRAVENOUS at 13:20

## 2018-08-01 RX ADMIN — DIPHENHYDRAMINE HYDROCHLORIDE 50 MG: 25 CAPSULE ORAL at 11:31

## 2018-08-01 RX ADMIN — SODIUM CHLORIDE 250 ML: 9 INJECTION, SOLUTION INTRAVENOUS at 11:31

## 2018-08-01 ASSESSMENT — PAIN SCALES - GENERAL: PAINLEVEL: NO PAIN (0)

## 2018-08-01 NOTE — PATIENT INSTRUCTIONS
Contact numbers:  Triage Main/After hours Line: [phone]    Main (scheduling) line: [phone]    Call with chills and/or temperature greater than or equal to 100.5 and questions or concerns.    If after hours, weekends, or holidays, call main hospital  at  [phone] and ask for Oncology doctor on call.           August 2018 Sunday Monday Tuesday Wednesday Thursday Friday Saturday                  1     [facility]    9:45 AM   (15 min.)   [facility]   [facility] Lab Draw     [facility] RETURN   10:15 AM   (50 min.)   [facility]   11:30 AM   (120 min.)    ONCOLOGY INFUSION   [facility] 2     3     4       5     6     7     [facility] DRAW   10:15 AM   (15 min.)    [facility] LAB DRAW   [facility] Lab Draw     [facility] RETURN   10:45 AM   (30 min.)   Kadi Dee MD   [facility]     [facility] ONC INFUSION 120   11:30 AM   (120 min.)    ONCOLOGY INFUSION   [facility] 8     9     10     11       12     13     14     [facility]    8:30 AM   (15 min.)    [facility]     [facility] ONC INFUSION 120    9:00 AM   (120 min.)    ONCOLOGY INFUSION   [facility] 15     16     17     18       19     20     21     22     23     24     25       26     27     28     29     30     31  Happy Birthday!                     September 2018 Sunday Monday Tuesday Wednesday Thursday Friday Saturday                                 1       2     3     4     5     6     7     8       9     10     11     12     13     14     15       16     17     18     19     20     21     22       23     24     25     26     27     28     29       30                                                Lab Results:  Recent Results (from the past 12 hour(s))   CBC with platelets differential    Collection Time: 08/01/18 10:28 AM    Result Value Ref Range    WBC 2.7 (L) 4.0 - 11.0 10e9/L    RBC Count 4.44 4.4 - 5.9 10e12/L    Hemoglobin 12.8 (L) 13.3 - 17.7 g/dL    Hematocrit 40.0 40.0 - 53.0 %    MCV 90 78 - 100 fl    MCH 28.8 26.5 - 33.0 pg    MCHC 32.0 31.5 - 36.5 g/dL    RDW 14.1 10.0 - 15.0 %    Platelet Count 146 (L) 150 - 450 10e9/L    Diff Method Automated Method     % Neutrophils 64.1 %    % Lymphocytes 29.7 %    % Monocytes 4.4 %    % Eosinophils 0.7 %    % Basophils 0.4 %    % Immature Granulocytes 0.7 %    Nucleated RBCs 0 0 /100    Absolute Neutrophil 1.8 1.6 - 8.3 10e9/L    Absolute Lymphocytes 0.8 0.8 - 5.3 10e9/L    Absolute Monocytes 0.1 0.0 - 1.3 10e9/L    Absolute Eosinophils 0.0 0.0 - 0.7 10e9/L    Absolute Basophils 0.0 0.0 - 0.2 10e9/L    Abs Immature Granulocytes 0.0 0 - 0.4 10e9/L    Absolute Nucleated RBC 0.0

## 2018-08-01 NOTE — NURSING NOTE
"Oncology Rooming Note    August 1, 2018 10:38 AM   Reuben Padilla is a 57 year old male who presents for:    Chief Complaint   Patient presents with     Port Draw     Labs drawn via port by RN. Line flushed and hep locked. VS taken.     Oncology Clinic Visit     Return visit related to Esophageal Cancer     Initial Vitals: /85 (BP Location: Right arm, Patient Position: Sitting, Cuff Size: Adult Large)  Pulse 63  Temp 97.7  F (36.5  C) (Oral)  Resp 18  Ht 1.981 m (6' 5.99\")  Wt (!) 165.6 kg (365 lb 1.6 oz)  SpO2 97%  BMI 42.2 kg/m2 Estimated body mass index is 42.2 kg/(m^2) as calculated from the following:    Height as of this encounter: 1.981 m (6' 5.99\").    Weight as of this encounter: 165.6 kg (365 lb 1.6 oz). Body surface area is 3.02 meters squared.  No Pain (0) Comment: Data Unavailable   No LMP for male patient.  Allergies reviewed: Yes  Medications reviewed: Yes    Medications: Medication refills not needed today.  Pharmacy name entered into [employer]:    [id number] - [place], [place] - [address] [place] [address] AT INTERSECTION [address] [place], [place] - [address] [place] [address] [phone]  [id number] - [place], [place] - [address] [facility]    Clinical concerns: No new concerns. Provider was notified.    10 minutes for nursing intake (face to face time)     Kadi Arevalo LPN            "

## 2018-08-01 NOTE — PROGRESS NOTES
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 His tumor is positive for cytokeratin 20, negative for P63 and CK7, and HER2 is negative. He started on FOLFOX (5FU/oxaliplatin) on 5/15/2017. He had a delay in treatment from 9/5-10/2/17 due to work related injury.     He had an excellent response by imaging throughout the summer 2017.    He a mixed response by imaging in late fall 2017.    In January 2018, he was switched to taxol and cyramza - had slight progression and neuropathy and clinical trial became available.     In March 2018, he was started on the [facility] Match Clinical Trial with crizotinib.  (MET amplification). Imaging in July 2018 showed progression  In July 2018, he was restarted on Taxol and cyramza while looking into other options (cabozantinib, clinical trial)    Interval History:  Levi is here with his wife today. He has neuropathy up to knees in both legs that had improved off Taxol/cyramza but then became a little worse after restarting last week. He is able to walk without difficulties with balance. He denies any neuropathy in hands.     He states he is able to eat foods with softer textures and tells me he cannot tell if his dysphagia is any worse because he avoids problematic foods. His weight is up 5 lbs from last 2 visits. Edema in legs is stable. He is on lovenox and denies any bleeding/bruising issues.    Review of Systems:  Patient denies any of the following except if noted above: fevers, chills, chest pain, dyspnea, cough, abdominal pain, nausea, vomiting, diarrhea, constipation, urinary concerns.    Current Outpatient Prescriptions   Medication Sig Dispense Refill     diphenoxylate-atropine (LOMOTIL) 2.5-0.025 MG per tablet Take 1 tablet by mouth 4 times daily as needed for diarrhea (Patient not  taking: Reported on 7/17/2018) 40 tablet 1     enoxaparin (LOVENOX) 150 MG/ML injection INJECT 0.86ML (130MG) TWICE A DAY FOR ACUTE PULMONARY EMBOLISM AND ESOPHAGEAL MALIGNANCY  3     enoxaparin (LOVENOX) 80 MG/0.8ML injection Inject 1.3 mLs (130 mg) Subcutaneous 2 times daily 60 Syringe 3     fish oil-omega-3 fatty acids 1000 MG capsule Take 2 g by mouth daily       furosemide (LASIX) 20 MG tablet Take 1 tablet (20 mg) by mouth daily (Patient not taking: Reported on 5/22/2018) 30 tablet 3     gabapentin (NEURONTIN) 300 MG capsule Two tablets in the am and one tablet in the afternoon and evening 180 capsule 1     latanoprost (XALATAN) 0.005 % ophthalmic solution Place 1 drop into both eyes At Bedtime 1 Bottle 0     lidocaine-prilocaine (EMLA) cream Apply topically as needed for moderate pain (Patient not taking: Reported on 7/17/2018) 30 g 3     LORazepam (ATIVAN) 0.5 MG tablet Take 1 tablet (0.5 mg) by mouth every 4 hours as needed (Anxiety, Nausea/Vomiting or Sleep) (Patient not taking: Reported on 7/24/2018) 30 tablet 2     LORazepam (ATIVAN) 0.5 MG tablet Take 1 tablet (0.5 mg) by mouth every 4 hours as needed (Anxiety, Nausea/Vomiting or Sleep) (Patient not taking: Reported on 7/24/2018) 30 tablet 2     multivitamin, therapeutic with minerals (MULTI-VITAMIN) TABS tablet Take 1 tablet by mouth daily       omeprazole (PRILOSEC) 40 MG capsule Take 1 capsule (40 mg) by mouth daily 90 capsule 1     ondansetron (ZOFRAN) 8 MG tablet Take 1 tablet (8 mg) by mouth every 8 hours as needed (Nausea/Vomiting) (Patient not taking: Reported on 7/17/2018) 30 tablet 2     prochlorperazine (COMPAZINE) 10 MG tablet Take 1 tablet (10 mg) by mouth every 6 hours as needed (Nausea/Vomiting) (Patient not taking: Reported on 7/24/2018) 30 tablet 2     prochlorperazine (COMPAZINE) 10 MG tablet Take 1 tablet (10 mg) by mouth every 6 hours as needed (Nausea/Vomiting) (Patient not taking: Reported on 1/22/2018) 30 tablet 2     tadalafil  (CIALIS) 5 MG tablet Take 2 tablets (10 mg) by mouth every 24 hours As needed for sexual activity (Patient not taking: Reported on 7/24/2018) 20 tablet 1     timolol (TIMOPTIC) 0.5 % ophthalmic solution Place into both eyes daily       zolpidem (AMBIEN) 10 MG tablet Take 1 tablet (10 mg) by mouth nightly as needed 60 tablet 1     [DISCONTINUED] dexamethasone (DECADRON) 4 MG tablet Take two tablets daily on days 2,3 and then 1 tablet daily days 4,5 6 tablet 1       Physical Examination:  General: The patient is a pleasant male in no acute distress.  There were no vitals taken for this visit.  Wt Readings from Last 10 Encounters:   07/24/18 (!) 163.3 kg (360 lb)   07/17/18 (!) 163.3 kg (360 lb)   06/20/18 (!) 165.2 kg (364 lb 2 oz)   06/19/18 (!) 165.5 kg (364 lb 12.8 oz)   05/22/18 (!) 165.2 kg (364 lb 4.8 oz)   04/24/18 (!) 162.7 kg (358 lb 11 oz)   03/22/18 (!) 164.4 kg (362 lb 6.4 oz)   03/13/18 (!) 163 kg (359 lb 4.8 oz)   03/06/18 (!) 162.3 kg (357 lb 12.8 oz)   02/19/18 (!) 161.1 kg (355 lb 3.2 oz)     HEENT: EOMI, PERRL. Sclerae are anicteric. Oral mucosa is pink and moist with no lesions or thrush.   Lymph: Neck is supple with no lymphadenopathy in the cervical or supraclavicular areas.   Heart: Regular rate and rhythm.   Lungs: Clear to auscultation bilaterally.   Abdomen: Bowel sounds present, soft, nontender with no palpable hepatosplenomegaly or masses.   Extremities: Wearing compression stockings.    Neuro: Cranial nerves II through XII are grossly intact.  Skin: No rashes, petechiae, or bruising noted on exposed skin.    Laboratory Data:  Results for KORI CHANEY (MRN [id number]) as of 8/1/2018 11:01   8/1/2018 10:28   WBC 2.7 (L)   Hemoglobin 12.8 (L)   Hematocrit 40.0   Platelet Count 146 (L)   RBC Count 4.44   MCV 90   MCH 28.8   MCHC 32.0   RDW 14.1   Diff Method Automated Method   % Neutrophils 64.1   % Lymphocytes 29.7   % Monocytes 4.4   % Eosinophils 0.7   % Basophils 0.4   % Immature  Granulocytes 0.7   Nucleated RBCs 0   Absolute Neutrophil 1.8   Absolute Lymphocytes 0.8   Absolute Monocytes 0.1   Absolute Eosinophils 0.0   Absolute Basophils 0.0   Abs Immature Granulocytes 0.0   Absolute Nucleated RBC 0.0       Assessment and Plan:  1. Metastatic esophogeal adenocarcinoma. He has had treatment with FOLFOX and then transitioned to Taxol with ramicurimab.  He had significant neuropathy with this treatment and did have slight progression during that time and therapy changed to crizotinib. However, restaging in July with progression of disease on crizotinib.  His [facility] testing did return with low positive PDL 1 status, indicating he could be a candidate for Keytruda, however given his symptoms there was need for more rapid control.  He resumed Taxol with Cyramza on 7/24. He presents today for D8 Taxol. Some worsening of neuropathy in lower extremities but still grade 1. Labs reviewed. WBC lower at 2.7 but ANC 1.8 and platelets stable at 146. Will proceed with Taxol     2. Edema -  stable      3. He has a history of pulmonary embolism.  He is on Lovenox twice daily.  He has progressed through Xarelto in the past.        4. Peripheral neuropathy: He does have baseline neuropathy.  Gabapentin is maintained at 600/300/600 mg daily. He stated he would try increasing dose to 600 TID to see if symptoms improved.     5. Sexual dysfunction: Cialis written on prior visit but not approved by insurance. This was not discussed during today's visit.       Amparo Sow PA-C  [facility]  [address] [place] [address]  [phone]

## 2018-08-01 NOTE — MR AVS SNAPSHOT
After Visit Summary   8/1/2018    Reuben Padilla    MRN: [id number]           Patient Information     Date Of Birth          1960        Visit Information        Provider Department      8/1/2018 11:30 AM UC 21 ATC;  ONCOLOGY INFUSION [facility]        Today's Diagnoses     Cancer of distal third of esophagus (H)    -  1      Care Instructions    Contact numbers:  Triage Main/After hours Line: [phone]    Main (scheduling) line: [phone]    Call with chills and/or temperature greater than or equal to 100.5 and questions or concerns.    If after hours, weekends, or holidays, call main hospital  at  [phone] and ask for Oncology doctor on call.           August 2018 Sunday Monday Tuesday Wednesday Thursday Friday Saturday                  1     [facility]    9:45 AM   (15 min.)    [facility]     [facility] RETURN   10:15 AM   (50 min.)   [facility]   11:30 AM   (120 min.)    ONCOLOGY INFUSION   [facility] 2     3     4       5     6     7     [facility] DRAW   10:15 AM   (15 min.)   [facility]   [facility] Lab Draw     UMP RETURN   10:45 AM   (30 min.)   Kadi Dee MD   [facility]     UMP ONC INFUSION 120   11:30 AM   (120 min.)    ONCOLOGY INFUSION   [facility] 8     9     10     11       12     13     14     [facility] DRAW    8:30 AM   (15 min.)    [facility] LAB DRAW   [facility] Lab Draw     UMP ONC INFUSION 120    9:00 AM   (120 min.)    ONCOLOGY INFUSION   [facility] 15     16     17     18       19     20     21     22     23     24     25       26     27     28     29     30     31  Happy Birthday!                     September 2018 Sunday Monday Tuesday Wednesday Thursday Friday Saturday                                  1       2     3     4     5     6     7     8       9     10     11     12     13     14     15       16     17     18     19     20     21     22       23     24     25     26     27     28     29       30                                                Lab Results:  Recent Results (from the past 12 hour(s))   CBC with platelets differential    Collection Time: 08/01/18 10:28 AM   Result Value Ref Range    WBC 2.7 (L) 4.0 - 11.0 10e9/L    RBC Count 4.44 4.4 - 5.9 10e12/L    Hemoglobin 12.8 (L) 13.3 - 17.7 g/dL    Hematocrit 40.0 40.0 - 53.0 %    MCV 90 78 - 100 fl    MCH 28.8 26.5 - 33.0 pg    MCHC 32.0 31.5 - 36.5 g/dL    RDW 14.1 10.0 - 15.0 %    Platelet Count 146 (L) 150 - 450 10e9/L    Diff Method Automated Method     % Neutrophils 64.1 %    % Lymphocytes 29.7 %    % Monocytes 4.4 %    % Eosinophils 0.7 %    % Basophils 0.4 %    % Immature Granulocytes 0.7 %    Nucleated RBCs 0 0 /100    Absolute Neutrophil 1.8 1.6 - 8.3 10e9/L    Absolute Lymphocytes 0.8 0.8 - 5.3 10e9/L    Absolute Monocytes 0.1 0.0 - 1.3 10e9/L    Absolute Eosinophils 0.0 0.0 - 0.7 10e9/L    Absolute Basophils 0.0 0.0 - 0.2 10e9/L    Abs Immature Granulocytes 0.0 0 - 0.4 10e9/L    Absolute Nucleated RBC 0.0                Follow-ups after your visit        Your next 10 appointments already scheduled     Aug 07, 2018 10:15 AM CDT   [facility] Lab Draw with [facility] LAB DRAW   [facility] Lab Draw ([facility])    [address]  [address]  [facility] [address] [phone]            Aug 07, 2018 11:00 AM CDT   (Arrive by 10:45 AM)   Return Visit with Kadi Dee MD   [facility] ([facility])    [address]  [address]  [facility] [address] [phone]            Aug 07, 2018 11:30 AM CDT   Infusion 120 with  ONCOLOGY INFUSION, UC 15 ATC   [facility] ([facility])    [address]  [address]  [place] [address]   [phone]            Aug 14, 2018  8:30 AM CDT   [facility] Lab Draw with [facility] LAB DRAW   [facility] Lab Draw ([facility])    [address]  [address]  [place] [address] [phone]            Aug 14, 2018  9:00 AM CDT   Infusion 120 with UC ONCOLOGY INFUSION, UC 25 ATC   [facility] ([facility])    [address]  [address]  [place] [address] [phone]              Who to contact     If you have questions or need follow up information about today's clinic visit or your schedule please contact [facility]ER [facility] directly at [phone].  Normal or non-critical lab and imaging results will be communicated to you by [employer], letter or phone within 4 business days after the clinic has received the results. If you do not hear from us within 7 days, please contact the clinic through [employer] or phone. If you have a critical or abnormal lab result, we will notify you by phone as soon as possible.  Submit refill requests through Just[employer] or call your pharmacy and they will forward the refill request to us. Please allow 3 business days for your refill to be completed.          Additional Information About Your Visit        JustShareIt Information     Just[employer] gives you secure access to your electronic health record. If you see a primary care provider, you can also send messages to your care team and make appointments. If you have questions, please call your primary care clinic.  If you do not have a primary care provider, please call [phone] and they will assist you.        Care EveryWhere ID     This is your Care EveryWhere ID. This could be used by other organizations to access your [place] medical records  [id number]         Blood Pressure from Last 3 Encounters:   08/01/18 127/85   07/24/18 127/85   07/17/18 113/70    Weight from Last 3 Encounters:    08/01/18 (!) 165.6 kg (365 lb 1.6 oz)   07/24/18 (!) 163.3 kg (360 lb)   07/17/18 (!) 163.3 kg (360 lb)              We Performed the Following     CBC with platelets differential        Primary Care Provider Fax #    Physician No Ref-Primary [phone]       No address on file        Equal Access to Services     [facility]: Hadii aad ku hadasho Soomaali, waaxda luqadaha, qaybta kaalmada adeegyada, waxay idiin haylann adelauryn munson padmini . So [facility] [phone].    ATENCIÓN: Si habla español, tiene a alamo disposición servicios gratuitos de asistencia lingüística. Llame al [phone].    We comply with applicable federal civil rights laws and [place] laws. We do not discriminate on the basis of race, color, national origin, age, disability, sex, sexual orientation, or gender identity.            Thank you!     Thank you for choosing [facility] CL[facility]  for your care. Our goal is always to provide you with excellent care. Hearing back from our patients is one way we can continue to improve our services. Please take a few minutes to complete the written survey that you may receive in the mail after your visit with us. Thank you!             Your Updated Medication List - Protect others around you: Learn how to safely use, store and throw away your medicines at www.disposemymeds.org.          This list is accurate as of 8/1/18  1:31 PM.  Always use your most recent med list.                   Brand Name Dispense Instructions for use Diagnosis    diphenoxylate-atropine 2.5-0.025 MG per tablet    LOMOTIL    40 tablet    Take 1 tablet by mouth 4 times daily as needed for diarrhea    Diarrhea, unspecified type       * enoxaparin 80 MG/0.8ML injection    LOVENOX    60 Syringe    Inject 1.3 mLs (130 mg) Subcutaneous 2 times daily    Cancer of distal third of esophagus (H)       * enoxaparin 150 MG/ML injection    LOVENOX     INJECT 0.86ML (130MG) TWICE A DAY FOR ACUTE PULMONARY EMBOLISM AND ESOPHAGEAL  MALIGNANCY        fish oil-omega-3 fatty acids 1000 MG capsule      Take 2 g by mouth daily        furosemide 20 MG tablet    LASIX    30 tablet    Take 1 tablet (20 mg) by mouth daily    Cancer of distal third of esophagus (H), Generalized edema       gabapentin 300 MG capsule    NEURONTIN    180 capsule    Two tablets in the am and one tablet in the afternoon and evening    Chemotherapy-induced neuropathy (H)       latanoprost 0.005 % ophthalmic solution    XALATAN    1 Bottle    Place 1 drop into both eyes At Bedtime    Cancer of distal third of esophagus (H)       lidocaine-prilocaine cream    EMLA    30 g    Apply topically as needed for moderate pain    Cancer of distal third of esophagus (H)       * LORazepam 0.5 MG tablet    ATIVAN    30 tablet    Take 1 tablet (0.5 mg) by mouth every 4 hours as needed (Anxiety, Nausea/Vomiting or Sleep)    Cancer of distal third of esophagus (H)       * LORazepam 0.5 MG tablet    ATIVAN    30 tablet    Take 1 tablet (0.5 mg) by mouth every 4 hours as needed (Anxiety, Nausea/Vomiting or Sleep)    Cancer of distal third of esophagus (H)       Multi-vitamin Tabs tablet      Take 1 tablet by mouth daily        omeprazole 40 MG capsule    priLOSEC    90 capsule    Take 1 capsule (40 mg) by mouth daily    Cancer of distal third of esophagus (H)       ondansetron 8 MG tablet    ZOFRAN    30 tablet    Take 1 tablet (8 mg) by mouth every 8 hours as needed (Nausea/Vomiting)    Cancer of distal third of esophagus (H)       * prochlorperazine 10 MG tablet    COMPAZINE    30 tablet    Take 1 tablet (10 mg) by mouth every 6 hours as needed (Nausea/Vomiting)    Cancer of distal third of esophagus (H)       * prochlorperazine 10 MG tablet    COMPAZINE    30 tablet    Take 1 tablet (10 mg) by mouth every 6 hours as needed (Nausea/Vomiting)    Cancer of distal third of esophagus (H)       tadalafil 5 MG tablet    CIALIS    20 tablet    Take 2 tablets (10 mg) by mouth every 24 hours As needed  for sexual activity    Malignant neoplasm of lower third of esophagus (H), Drug-induced erectile dysfunction       timolol 0.5 % ophthalmic solution    TIMOPTIC     Place into both eyes daily    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 Read the directions carefully, and ask your doctor or other care provider to review them with you.

## 2018-08-01 NOTE — PROGRESS NOTES
Infusion Nursing Note:  Reubne Padilla presents for C1D8 Taxol   Met with SERGIO Bonilla before infusion.    Note: N/A.    Treatment Conditions:  Lab Results   Component Value Date    HGB 12.8 08/01/2018     Lab Results   Component Value Date    WBC 2.7 08/01/2018      Lab Results   Component Value Date    ANEU 1.8 08/01/2018     Lab Results   Component Value Date     08/01/2018      Lab Results   Component Value Date     07/24/2018                   Lab Results   Component Value Date    POTASSIUM 4.2 07/24/2018           Lab Results   Component Value Date    MAG 1.9 07/17/2018            Lab Results   Component Value Date    CR 0.91 07/24/2018                   Lab Results   Component Value Date    NIDHI 8.4 07/24/2018                Lab Results   Component Value Date    BILITOTAL 0.4 07/24/2018           Lab Results   Component Value Date    ALBUMIN 3.2 07/24/2018                    Lab Results   Component Value Date    ALT 30 07/24/2018           Lab Results   Component Value Date    AST 22 07/24/2018       Results reviewed, labs MET treatment parameters, ok to proceed with treatment.    Intravenous Access:  Implanted Port.    Post Infusion Assessment:  Patient tolerated infusion without incident.  Blood return noted pre and post infusion.  No evidence of extravasations.  Access discontinued per protocol.    Discharge Plan:   Patient declined prescription refills.  Discharge instructions reviewed with: Patient and Family.  Patient and/or family verbalized understanding of discharge instructions and all questions answered.  AVS to patient via [employer].  Patient will return 8/7 for next appointment.   Patient discharged in stable condition accompanied by: self and wife.    Lily Mahoney RN

## 2018-08-01 NOTE — MR AVS SNAPSHOT
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)           Follow-ups after your visit        Your next 10 appointments already scheduled     Aug 07, 2018 10:15 AM CDT   [facility] Lab Draw with [facility] LAB DRAW   [facility]    [address] [place] [address]  [address]  [facility] [address]   [phone]            Aug 07, 2018 11:00 AM CDT   (Arrive by 10:45 AM)   Return Visit with Kadi Dee MD   [facility] ([facility])    [address] [place] [address]  [address]  [facility] [address]   [phone]            Aug 07, 2018 11:30 AM CDT   Infusion 120 with UC ONCOLOGY INFUSION, UC 15 ATC   [facility] ([facility])    [address] [place] [address]  [address]  [facility] [address]   [phone]            Aug 14, 2018  8:30 AM CDT   [facility] Lab Draw with [facility] LAB DRAW   [facility]    [address] [place] [address]  [address]  [facility] [address]   [phone]            Aug 14, 2018  9:00 AM CDT   Infusion 120 with UC ONCOLOGY INFUSION, UC 25 ATC   [facility] ([facility])    [address] [place] [address]  [address]  [facility] [address]   [phone]              Who to contact     If you have questions or need follow up information about today's clinic visit or your schedule please contact [facility] directly at [phone].  Normal or non-critical lab and imaging results will be communicated to you by MyChart, letter or phone within 4 business days after the clinic has  "received the results. If you do not hear from us within 7 days, please contact the clinic through [employer] or phone. If you have a critical or abnormal lab result, we will notify you by phone as soon as possible.  Submit refill requests through [employer] or call your pharmacy and they will forward the refill request to us. Please allow 3 business days for your refill to be completed.          Additional Information About Your Visit        [employer] Information     [employer] gives you secure access to your electronic health record. If you see a primary care provider, you can also send messages to your care team and make appointments. If you have questions, please call your primary care clinic.  If you do not have a primary care provider, please call [phone] and they will assist you.        Care EveryWhere ID     This is your Care EveryWhere ID. This could be used by other organizations to access your [facility] medical records  [id number]        Your Vitals Were     Pulse Temperature Respirations Height Pulse Oximetry BMI (Body Mass Index)    63 97.7  F (36.5  C) (Oral) 18 1.981 m (6' 5.99\") 97% 42.2 kg/m2       Blood Pressure from Last 3 Encounters:   08/01/18 127/85   07/24/18 127/85   07/17/18 113/70    Weight from Last 3 Encounters:   08/01/18 (!) 165.6 kg (365 lb 1.6 oz)   07/24/18 (!) 163.3 kg (360 lb)   07/17/18 (!) 163.3 kg (360 lb)              Today, you had the following     No orders found for display       Primary Care Provider Fax #    Physician No Ref-Primary [phone]       No address on file        Equal Access to Services     [facility]: Hadii antonio harrison hadasho Soomaali, waaxda luqadaha, qaybta kaalmada adeegyajuancarlos, geraldo [place] . So [facility] [phone].    ATENCIÓN: Si habla español, tiene a alamo disposición servicios gratuitos de asistencia lingüística. Llame al [phone].    We comply with applicable federal civil rights laws and [place] laws. We do not discriminate on " the basis of race, color, national origin, age, disability, sex, sexual orientation, or gender identity.            Thank you!     Thank you for choosing [facility]  for your care. Our goal is always to provide you with excellent care. Hearing back from our patients is one way we can continue to improve our services. Please take a few minutes to complete the written survey that you may receive in the mail after your visit with us. Thank you!             Your Updated Medication List - Protect others around you: Learn how to safely use, store and throw away your medicines at www.disposemymeds.org.          This list is accurate as of 8/1/18  2:51 PM.  Always use your most recent med list.                   Brand Name Dispense Instructions for use Diagnosis    diphenoxylate-atropine 2.5-0.025 MG per tablet    LOMOTIL    40 tablet    Take 1 tablet by mouth 4 times daily as needed for diarrhea    Diarrhea, unspecified type       * enoxaparin 80 MG/0.8ML injection    LOVENOX    60 Syringe    Inject 1.3 mLs (130 mg) Subcutaneous 2 times daily    Cancer of distal third of esophagus (H)       * enoxaparin 150 MG/ML injection    LOVENOX     INJECT 0.86ML (130MG) TWICE A DAY FOR ACUTE PULMONARY EMBOLISM AND ESOPHAGEAL MALIGNANCY        fish oil-omega-3 fatty acids 1000 MG capsule      Take 2 g by mouth daily        furosemide 20 MG tablet    LASIX    30 tablet    Take 1 tablet (20 mg) by mouth daily    Cancer of distal third of esophagus (H), Generalized edema       gabapentin 300 MG capsule    NEURONTIN    180 capsule    Two tablets in the am and one tablet in the afternoon and evening    Chemotherapy-induced neuropathy (H)       latanoprost 0.005 % ophthalmic solution    XALATAN    1 Bottle    Place 1 drop into both eyes At Bedtime    Cancer of distal third of esophagus (H)       lidocaine-prilocaine cream    EMLA    30 g    Apply topically as needed for moderate pain    Cancer of distal third of  esophagus (H)       * LORazepam 0.5 MG tablet    ATIVAN    30 tablet    Take 1 tablet (0.5 mg) by mouth every 4 hours as needed (Anxiety, Nausea/Vomiting or Sleep)    Cancer of distal third of esophagus (H)       * LORazepam 0.5 MG tablet    ATIVAN    30 tablet    Take 1 tablet (0.5 mg) by mouth every 4 hours as needed (Anxiety, Nausea/Vomiting or Sleep)    Cancer of distal third of esophagus (H)       Multi-vitamin Tabs tablet      Take 1 tablet by mouth daily        omeprazole 40 MG capsule    priLOSEC    90 capsule    Take 1 capsule (40 mg) by mouth daily    Cancer of distal third of esophagus (H)       ondansetron 8 MG tablet    ZOFRAN    30 tablet    Take 1 tablet (8 mg) by mouth every 8 hours as needed (Nausea/Vomiting)    Cancer of distal third of esophagus (H)       * prochlorperazine 10 MG tablet    COMPAZINE    30 tablet    Take 1 tablet (10 mg) by mouth every 6 hours as needed (Nausea/Vomiting)    Cancer of distal third of esophagus (H)       * prochlorperazine 10 MG tablet    COMPAZINE    30 tablet    Take 1 tablet (10 mg) by mouth every 6 hours as needed (Nausea/Vomiting)    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 Read the directions carefully, and ask your doctor or other care provider to review them with you.

## 2018-08-01 NOTE — TELEPHONE ENCOUNTER
Pt will be getting a tattoo 8/4. Per dr Dee he should hold lovenox the evening prior and morning of the tattoo. He may restart the evening of.   Pt voices good understanding.

## 2018-08-01 NOTE — LETTER
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 His tumor is positive for cytokeratin 20, negative for P63 and CK7, and HER2 is negative. He started on FOLFOX (5FU/oxaliplatin) on 5/15/2017. He had a delay in treatment from 9/5-10/2/17 due to work related injury.     He had an excellent response by imaging throughout the summer 2017.    He a mixed response by imaging in late fall 2017.    In January 2018, he was switched to taxol and cyramza - had slight progression and neuropathy and clinical trial became available.     In March 2018, he was started on the [facility] Match Clinical Trial with crizotinib.  (MET amplification). Imaging in July 2018 showed progression  In July 2018, he was restarted on Taxol and cyramza while looking into other options (cabozantinib, clinical trial)    Interval History:  Levi is here with his wife today. He has neuropathy up to knees in both legs that had improved off Taxol/cyramza but then became a little worse after restarting last week. He is able to walk without difficulties with balance. He denies any neuropathy in hands.     He states he is able to eat foods with softer textures and tells me he cannot tell if his dysphagia is any worse because he avoids problematic foods. His weight is up 5 lbs from last 2 visits. Edema in legs is stable. He is on lovenox and denies any bleeding/bruising issues.    Review of Systems:  Patient denies any of the following except if noted above: fevers, chills, chest pain, dyspnea, cough, abdominal pain, nausea, vomiting, diarrhea, constipation, urinary concerns.    Current Outpatient Prescriptions   Medication Sig Dispense Refill     diphenoxylate-atropine (LOMOTIL) 2.5-0.025 MG per  tablet Take 1 tablet by mouth 4 times daily as needed for diarrhea (Patient not taking: Reported on 7/17/2018) 40 tablet 1     enoxaparin (LOVENOX) 150 MG/ML injection INJECT 0.86ML (130MG) TWICE A DAY FOR ACUTE PULMONARY EMBOLISM AND ESOPHAGEAL MALIGNANCY  3     enoxaparin (LOVENOX) 80 MG/0.8ML injection Inject 1.3 mLs (130 mg) Subcutaneous 2 times daily 60 Syringe 3     fish oil-omega-3 fatty acids 1000 MG capsule Take 2 g by mouth daily       furosemide (LASIX) 20 MG tablet Take 1 tablet (20 mg) by mouth daily (Patient not taking: Reported on 5/22/2018) 30 tablet 3     gabapentin (NEURONTIN) 300 MG capsule Two tablets in the am and one tablet in the afternoon and evening 180 capsule 1     latanoprost (XALATAN) 0.005 % ophthalmic solution Place 1 drop into both eyes At Bedtime 1 Bottle 0     lidocaine-prilocaine (EMLA) cream Apply topically as needed for moderate pain (Patient not taking: Reported on 7/17/2018) 30 g 3     LORazepam (ATIVAN) 0.5 MG tablet Take 1 tablet (0.5 mg) by mouth every 4 hours as needed (Anxiety, Nausea/Vomiting or Sleep) (Patient not taking: Reported on 7/24/2018) 30 tablet 2     LORazepam (ATIVAN) 0.5 MG tablet Take 1 tablet (0.5 mg) by mouth every 4 hours as needed (Anxiety, Nausea/Vomiting or Sleep) (Patient not taking: Reported on 7/24/2018) 30 tablet 2     multivitamin, therapeutic with minerals (MULTI-VITAMIN) TABS tablet Take 1 tablet by mouth daily       omeprazole (PRILOSEC) 40 MG capsule Take 1 capsule (40 mg) by mouth daily 90 capsule 1     ondansetron (ZOFRAN) 8 MG tablet Take 1 tablet (8 mg) by mouth every 8 hours as needed (Nausea/Vomiting) (Patient not taking: Reported on 7/17/2018) 30 tablet 2     prochlorperazine (COMPAZINE) 10 MG tablet Take 1 tablet (10 mg) by mouth every 6 hours as needed (Nausea/Vomiting) (Patient not taking: Reported on 7/24/2018) 30 tablet 2     prochlorperazine (COMPAZINE) 10 MG tablet Take 1 tablet (10 mg) by mouth every 6 hours as needed  (Nausea/Vomiting) (Patient not taking: Reported on 1/22/2018) 30 tablet 2     tadalafil (CIALIS) 5 MG tablet Take 2 tablets (10 mg) by mouth every 24 hours As needed for sexual activity (Patient not taking: Reported on 7/24/2018) 20 tablet 1     timolol (TIMOPTIC) 0.5 % ophthalmic solution Place into both eyes daily       zolpidem (AMBIEN) 10 MG tablet Take 1 tablet (10 mg) by mouth nightly as needed 60 tablet 1     [DISCONTINUED] dexamethasone (DECADRON) 4 MG tablet Take two tablets daily on days 2,3 and then 1 tablet daily days 4,5 6 tablet 1       Physical Examination:  General: The patient is a pleasant male in no acute distress.  There were no vitals taken for this visit.  Wt Readings from Last 10 Encounters:   07/24/18 (!) 163.3 kg (360 lb)   07/17/18 (!) 163.3 kg (360 lb)   06/20/18 (!) 165.2 kg (364 lb 2 oz)   06/19/18 (!) 165.5 kg (364 lb 12.8 oz)   05/22/18 (!) 165.2 kg (364 lb 4.8 oz)   04/24/18 (!) 162.7 kg (358 lb 11 oz)   03/22/18 (!) 164.4 kg (362 lb 6.4 oz)   03/13/18 (!) 163 kg (359 lb 4.8 oz)   03/06/18 (!) 162.3 kg (357 lb 12.8 oz)   02/19/18 (!) 161.1 kg (355 lb 3.2 oz)     HEENT: EOMI, PERRL. Sclerae are anicteric. Oral mucosa is pink and moist with no lesions or thrush.   Lymph: Neck is supple with no lymphadenopathy in the cervical or supraclavicular areas.   Heart: Regular rate and rhythm.   Lungs: Clear to auscultation bilaterally.   Abdomen: Bowel sounds present, soft, nontender with no palpable hepatosplenomegaly or masses.   Extremities: Wearing compression stockings.    Neuro: Cranial nerves II through XII are grossly intact.  Skin: No rashes, petechiae, or bruising noted on exposed skin.    Laboratory Data:  Results for KORI CHANEY (MRN [id number]) as of 8/1/2018 11:01   8/1/2018 10:28   WBC 2.7 (L)   Hemoglobin 12.8 (L)   Hematocrit 40.0   Platelet Count 146 (L)   RBC Count 4.44   MCV 90   MCH 28.8   MCHC 32.0   RDW 14.1   Diff Method Automated Method   % Neutrophils 64.1   %  Lymphocytes 29.7   % Monocytes 4.4   % Eosinophils 0.7   % Basophils 0.4   % Immature Granulocytes 0.7   Nucleated RBCs 0   Absolute Neutrophil 1.8   Absolute Lymphocytes 0.8   Absolute Monocytes 0.1   Absolute Eosinophils 0.0   Absolute Basophils 0.0   Abs Immature Granulocytes 0.0   Absolute Nucleated RBC 0.0       Assessment and Plan:  1. Metastatic esophogeal adenocarcinoma. He has had treatment with FOLFOX and then transitioned to Taxol with ramicurimab.  He had significant neuropathy with this treatment and did have slight progression during that time and therapy changed to crizotinib. However, restaging in July with progression of disease on crizotinib.  His [facility] testing did return with low positive PDL 1 status, indicating he could be a candidate for Keytruda, however given his symptoms there was need for more rapid control.   He resumed Taxol with Cyramza  on 7/24. He presents today for D8 Taxol. Some worsening of neuropathy in lower extremities but still grade 1. Labs reviewed. WBC lower at 2.7 but ANC 1.8 and platelets stable at 146. Will proceed with Taxol     2. Edema -  stable      3. He has a history of pulmonary embolism.  He is on Lovenox twice daily.  He has progressed through Xarelto in the past.        4.  Peripheral neuropathy: He does have baseline neuropathy.  Gabapentin is maintained at 600/300/600 mg daily. He stated he would try increasing dose to 600 TID to see if symptoms improved.     5. Sexual dysfunction: Cialis written on prior visit but not approved by insurance. This was not discussed during today's visit.       Amparo Sow PA-C  [facility]  [address] [place] [address]  [phone]

## 2018-08-07 ENCOUNTER — APPOINTMENT (OUTPATIENT)
Dept: LAB | Facility: CLINIC | Age: 58
End: 2018-08-07
Attending: INTERNAL MEDICINE
Payer: COMMERCIAL

## 2018-08-07 ENCOUNTER — ONCOLOGY VISIT (OUTPATIENT)
Dept: ONCOLOGY | Facility: CLINIC | Age: 58
End: 2018-08-07
Attending: INTERNAL MEDICINE
Payer: COMMERCIAL

## 2018-08-07 ENCOUNTER — CARE COORDINATION (OUTPATIENT)
Dept: ONCOLOGY | Facility: CLINIC | Age: 58
End: 2018-08-07
Payer: COMMERCIAL

## 2018-08-07 VITALS
WEIGHT: 315 LBS | SYSTOLIC BLOOD PRESSURE: 133 MMHG | BODY MASS INDEX: 42.95 KG/M2 | TEMPERATURE: 98.7 F | RESPIRATION RATE: 18 BRPM | HEART RATE: 81 BPM | DIASTOLIC BLOOD PRESSURE: 85 MMHG | OXYGEN SATURATION: 97 %

## 2018-08-07 DIAGNOSIS — C15.5 CANCER OF DISTAL THIRD OF ESOPHAGUS (H): Primary | ICD-10-CM

## 2018-08-07 PROCEDURE — 25000128 H RX IP 250 OP 636: Mod: ZF | Performed by: INTERNAL MEDICINE

## 2018-08-07 PROCEDURE — 99214 OFFICE O/P EST MOD 30 MIN: CPT | Mod: ZP | Performed by: INTERNAL MEDICINE

## 2018-08-07 PROCEDURE — G0463 HOSPITAL OUTPT CLINIC VISIT: HCPCS | Mod: ZF

## 2018-08-07 PROCEDURE — 36591 DRAW BLOOD OFF VENOUS DEVICE: CPT

## 2018-08-07 RX ORDER — HEPARIN SODIUM (PORCINE) LOCK FLUSH IV SOLN 100 UNIT/ML 100 UNIT/ML
5 SOLUTION INTRAVENOUS EVERY 8 HOURS
Status: DISCONTINUED | OUTPATIENT
Start: 2018-08-07 | End: 2018-08-09 | Stop reason: HOSPADM

## 2018-08-07 RX ADMIN — SODIUM CHLORIDE, PRESERVATIVE FREE 5 ML: 5 INJECTION INTRAVENOUS at 10:41

## 2018-08-07 ASSESSMENT — PAIN SCALES - GENERAL: PAINLEVEL: NO PAIN (0)

## 2018-08-07 NOTE — LETTER
8/7/2018       RE: Reuben Padilla  [address] [place] [address]     Dear Colleague,    Thank you for referring your patient, Reuben Padilla, to the [facility]. Please see a copy of my visit note below.    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 His tumor is positive for cytokeratin 20, negative for P63 and CK7, and HER2 is negative. He started on FOLFOX (5FU/oxaliplatin) on 5/15/2017. He had a delay in treatment from 9/5-10/2/17 due to work related injury.    He had an excellent response by imaging throughout the summer 2017.    He a mixed response by imaging in late fall 2017.    In January 2018, he was switched to taxol and cyramza - had slight progression and neuropathy and clinical trial became available.       In March 2018, he was started on the [facility] Match Clinical Trial with crizotinib.  (MET amplification) He remained on crizotinib from March - July 2018.    INTERVAL HISTORY: Levi comes in today for followup.  Overall, Reuben is doing reasonably well.  He believes his neuropathy is stable.  He has some numbness in his feet bilaterally.  He has had no problems with balance or falls.  No numbness or tingling in his fingertips.  He reports no fevers or chills.  He thinks that his dysphagia has improved.  He has also monitored his diet.  It is unclear to him whether or not this is diet related or if this is actually improvement of his tumor.      No nausea or vomiting.  He had loose stools today.  No constipation.      His 10-point review of systems is otherwise negative.        PHYSICAL EXAMINATION  /85 (BP Location: Right arm, Patient Position: Sitting, Cuff Size: Adult Large)  Pulse 81  Temp 98.7  F (37.1  C) (Oral)  Resp 18  Wt (!) 168.6 kg (371 lb 9.6 oz)  SpO2  97%  BMI 42.95 kg/m2 /90  Wt Readings from Last 4 Encounters:   08/07/18 (!) 168.6 kg (371 lb 9.6 oz)   08/01/18 (!) 165.6 kg (365 lb 1.6 oz)   07/24/18 (!) 163.3 kg (360 lb)   07/17/18 (!) 163.3 kg (360 lb)     Constitutional: Alert, oriented male in no visible distress.  Eyes: PERRL. Anicteric sclerae.  ENT/Mouth: OM moist and pink without lesions or thrush.  CV: RRR  Resp: CTAB throughout  Abdomen: Soft, non-tender, non-distended. Obese. Bowel sounds present. Unable to palpate liver or spleen.   Extremities: 1+ pitting edema    Skin: Warm, dry.   Lymph: No cervical or supraclavicular lymphadenopathy appreciated.   Neuro: CN II-XII grossly intact.  Absent reflexed at knees bilaterally    ECOG PS: 1    LABS:  Lab Results   Component Value Date    WBC 1.2 08/07/2018     Lab Results   Component Value Date    RBC 4.08 08/07/2018     Lab Results   Component Value Date    HGB 12.0 08/07/2018     Lab Results   Component Value Date    HCT 37.0 08/07/2018     No components found for: MCT  Lab Results   Component Value Date    MCV 91 08/07/2018     Lab Results   Component Value Date    MCH 29.4 08/07/2018     Lab Results   Component Value Date    MCHC 32.4 08/07/2018     Lab Results   Component Value Date    RDW 14.0 08/07/2018     Lab Results   Component Value Date     08/07/2018       Recent Labs   Lab Test  07/24/18   1516  07/17/18   1250   NA  140  143   POTASSIUM  4.2  4.5   CHLORIDE  108  109   CO2  26  24   ANIONGAP  6  10   GLC  100*  95   BUN  10  15   CR  0.91  1.01   NIDHI  8.4*  8.6     Liver Function Studies -   Recent Labs   Lab Test  07/24/18   1516   PROTTOTAL  7.4   ALBUMIN  3.2*   BILITOTAL  0.4   ALKPHOS  96   AST  22   ALT  30       [facility] One testing - MET amplification and KRAS amplification; results scanned in computer      IMAGING:  CT CAP 7/17/18  IMPRESSION: In this patient with esophageal cancer, there is evidence  of progression of disease:  1a. Increased size of the ill-defined  mass which abuts the esophageal  junction and diaphragmatic andrey extending into the retroperitoneum and  central mesentery.  1b. Increased size of a presumably paraesophageal lymph node.  1c. Worsening retroperitoneal lymphadenopathy including two dominant  peripancreatic/periceliac nodes. Additional unchanged enlarged  mediastinal adenopathy.  3. Unchanged compression deformities of T9, T12, and L2.    IMPRESSION/PLAN:  1. Metastatic esophogeal adenocarcinoma. He has had treatment with FOLFOX and then transitioned to Taxol with ramicurimab.  He was then on crizotinib on the [employer] Match study.  He progressed on this and returned on taxol/cyramza.  He is  Tolerating this well.  He has neutropenia today and will not receive therapy.  We discussed having him move to treatment day 1, 8 every 21 days.      He will be reimaged in Sept.      His Foundation One testing did return with low positive PDL 1 status, indicating he could be a candidate for Keytruda.  Will also look into cabozantinib insurance coverage.    Also discussed that he should continue to enjoy his time and complete any potential travel plans he has, possibly a trip to [place], in the near future given that he is currently feeling well.    2. Edema -  stable     3. He has a history of pulmonary embolism.  He is on Lovenox twice daily.  He has progressed through Xarelto in the past.       4. He does have baseline neuropathy.  Gabapentin is maintained at 600/300/600 mg daily.     5. Sexual dysfunction - trial of cialis sent out to pharmacy.    He understands his therapies are not curative intent, but rather palliative.    Kadi Dee

## 2018-08-07 NOTE — PROGRESS NOTES
"CBC orders faxed to [facility] at [phone], to be drawn on 8/13/18. \"OK\" results via RightFax. Requested results be faxed to clinic. Will await results.   "

## 2018-08-07 NOTE — PATIENT INSTRUCTIONS
Levi,    It was nice to see you today.    Your counts are too low today for therapy.    I'd like you to get your blood counts drawn 8/13 at [facility].  We will plan for chemotherapy 8/14 and 8/21.  You will be off 8/28.  We will plan to restage you in early Sept around 9/4, depending on availability.    Kadi Dee

## 2018-08-07 NOTE — NURSING NOTE
Port de-access per provider.  Bandage placed over site. Patient tolerated with no complications.     Please see IV access in flowsheets.    Isis Calhoun CMA ([facility])

## 2018-08-07 NOTE — NURSING NOTE
"Oncology Rooming Note    August 7, 2018 11:05 AM   Reuben Padilla is a 57 year old male who presents for:    Chief Complaint   Patient presents with     Port Draw     Right port accessed with a gripper needle, labs drawn and sent, flushed with saline and heparin, vitals completed, checked into next appointment.     Oncology Clinic Visit     returnt - esophageal      Initial Vitals: /85 (BP Location: Right arm, Patient Position: Sitting, Cuff Size: Adult Large)  Pulse 81  Temp 98.7  F (37.1  C) (Oral)  Resp 18  Wt (!) 168.6 kg (371 lb 9.6 oz)  SpO2 97%  BMI 42.95 kg/m2 Estimated body mass index is 42.95 kg/(m^2) as calculated from the following:    Height as of 8/1/18: 1.981 m (6' 5.99\").    Weight as of this encounter: 168.6 kg (371 lb 9.6 oz). Body surface area is 3.05 meters squared.  No Pain (0) Comment: Data Unavailable   No LMP for male patient.  Allergies reviewed: Yes  Medications reviewed: Yes    Medications: MEDICATION REFILLS NEEDED TODAY. Provider was notified.  Lorazapam   Pharmacy name entered into T[employer]:    [id number] - [place], [place] - [address]Replaced by [facility] [address] AT [address] [place], [place] - [address] [place] [address] [address]  [id number] - [place], [place] - [address] [place]    Clinical concerns: no new concerns      6 minutes for nursing intake (face to face time)     Noris Chun CMA              "

## 2018-08-07 NOTE — NURSING NOTE
Chief Complaint   Patient presents with     Port Draw     Right port accessed with a gripper needle, labs drawn and sent, flushed with saline and heparin, vitals completed, checked into next appointment.   Angelic Peralta,RN

## 2018-08-07 NOTE — MR AVS SNAPSHOT
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    Your counts are too low today for therapy.    I'd like you to get your blood counts drawn 8/13 at [place].  We will plan for chemotherapy 8/14 and 8/21.  You will be off 8/28.  We will plan to restage you in early Sept around 9/4, depending on availability.    Kadi Dee          Follow-ups after your visit        Your next 10 appointments already scheduled     Aug 14, 2018  8:30 AM CDT   M[facility] Lab Draw with UC MASONIC LAB DRAW   [facility] ([facility])    [address] [place] [address]  [address]  [facility] [id number] [phone]            Aug 14, 2018  9:00 AM CDT   Infusion 120 with UC ONCOLOGY INFUSION, UC 25 ATC   [facility] ([facility])    [address]  [address]  [facility] [address] [phone]            Aug 21, 2018  9:00 AM CDT   Infusion 120 with UC ONCOLOGY INFUSION, UC 25 ATC   [facility] ([facility])    [address]  [address]  [facility] [address]   [phone]            Aug 21, 2018  9:20 AM CDT   (Arrive by 9:05 AM)   Return Visit with Katalina Ramirez PA-C   [facility] ([facility])    [address] [place] [address]  [address]  [facility] [address] [phone]            Sep 04, 2018  1:15 PM CDT   M[facility]ic Lab Draw with UC MASONIC LAB DRAW   [facility] Masonic Lab Draw ([facility])    [address] [place] [address]  [address]  [facility] [address]    [phone]            Sep 04, 2018  1:40 PM CDT   CT CHEST/ABDOMEN/PELVIS W CONTRAST with UCCT2   [facility] CT ([facility])    [address]  1st Floor  [facility] [id number] [phone]           Please bring any scans or X-rays taken at other hospitals, if similar tests were done. Also bring a list of your medicines, including vitamins, minerals and over-the-counter drugs. It is safest to leave personal items at home.  Be sure to tell your doctor:   If you have any allergies.   If there s any chance you are pregnant.   If you are breastfeeding.  How to prepare:   Do not eat or drink for 2 hours before your exam. If you need to take medicine, you may take it with small sips of water. (We may ask you to take liquid medicine as well.)   Please wear loose clothing, such as a sweat suit or jogging clothes. Avoid snaps, zippers and other metal. We may ask you to undress and put on a hospital gown.  Please arrive 30 minutes early for your CT. Once in the department you might be asked to drink water 15-20 minutes prior to your exam.  If indicated you may be asked to drink an oral contrast in advance of your CT.  If this is the case, the imaging team will let you know or be in contact with you prior to your appointment  Patients over 70 or patients with diabetes or kidney problems:   If you haven t had a blood test (creatinine test) within the last 30 days, the Cardiologist/Radiologist may require you to get this test prior to your exam.  If you have diabetes:   Continue to take your metformin medication on the day of your exam  If you have any questions, please call the Imaging Department where you will have your exam.            Sep 04, 2018  3:30 PM CDT   (Arrive by 3:15 PM)   Return Visit with Kadi Dee MD   [facility] ([facility])    [address]  [address]  [facility] [id number] [phone]             Sep 05, 2018  9:00 AM CDT   Infusion 120 with UC ONCOLOGY INFUSION   [facility] ([facility])    [address]  [address]  [facility] [address] [phone]              Future tests that were ordered for you today     Open Future Orders        Priority Expected Expires Ordered    CT Chest/Abdomen/Pelvis w Contrast Routine  3/5/2019 8/7/2018            Who to contact     If you have questions or need follow up information about today's clinic visit or your schedule please contact [facility] directly at [phone].  Normal or non-critical lab and imaging results will be communicated to you by [employer], letter or phone within 4 business days after the clinic has received the results. If you do not hear from us within 7 days, please contact the clinic through [employer]t or phone. If you have a critical or abnormal lab result, we will notify you by phone as soon as possible.  Submit refill requests through [employer] or call your pharmacy and they will forward the refill request to us. Please allow 3 business days for your refill to be completed.          Additional Information About Your Visit        MyChart Information     [employer] gives you secure access to your electronic health record. If you see a primary care provider, you can also send messages to your care team and make appointments. If you have questions, please call your primary care clinic.  If you do not have a primary care provider, please call [phone] and they will assist you.        Care EveryWhere ID     This is your Care EveryWhere ID. This could be used by other organizations to access your [place] medical records  [id number]        Your Vitals Were     Pulse Temperature Respirations Pulse Oximetry BMI (Body Mass Index)       81 98.7  F (37.1  C) (Oral) 18 97% 42.95 kg/m2        Blood Pressure from Last 3 Encounters:   08/07/18 133/85   08/01/18 127/85   07/24/18  127/85    Weight from Last 3 Encounters:   08/07/18 (!) 168.6 kg (371 lb 9.6 oz)   08/01/18 (!) 165.6 kg (365 lb 1.6 oz)   07/24/18 (!) 163.3 kg (360 lb)               Primary Care Provider Fax #    Physician No Ref-Primary [phone]       No address on file        Equal Access to Services     [facility] : Hadii aad ku hadasho Soomaali, waaxda luqadaha, qaybta kaalmada adeegyada, waxay idiin haylann adelauryn munson laFranciscaclaude . So [facility] [phone].    ATENCIÓN: Si habla español, tiene a alamo disposición servicios gratuitos de asistencia lingüística. Llame al [phone].    We comply with applicable federal civil rights laws and [place] laws. We do not discriminate on the basis of race, color, national origin, age, disability, sex, sexual orientation, or gender identity.            Thank you!     Thank you for choosing [facility]  for your care. Our goal is always to provide you with excellent care. Hearing back from our patients is one way we can continue to improve our services. Please take a few minutes to complete the written survey that you may receive in the mail after your visit with us. Thank you!             Your Updated Medication List - Protect others around you: Learn how to safely use, store and throw away your medicines at www.disposemymeds.org.          This list is accurate as of 8/7/18 12:43 PM.  Always use your most recent med list.                   Brand Name Dispense Instructions for use Diagnosis    diphenoxylate-atropine 2.5-0.025 MG per tablet    LOMOTIL    40 tablet    Take 1 tablet by mouth 4 times daily as needed for diarrhea    Diarrhea, unspecified type       * enoxaparin 80 MG/0.8ML injection    LOVENOX    60 Syringe    Inject 1.3 mLs (130 mg) Subcutaneous 2 times daily    Cancer of distal third of esophagus (H)       * enoxaparin 150 MG/ML injection    LOVENOX     INJECT 0.86ML (130MG) TWICE A DAY FOR ACUTE PULMONARY EMBOLISM AND ESOPHAGEAL MALIGNANCY        fish  oil-omega-3 fatty acids 1000 MG capsule      Take 2 g by mouth daily        furosemide 20 MG tablet    LASIX    30 tablet    Take 1 tablet (20 mg) by mouth daily    Cancer of distal third of esophagus (H), Generalized edema       gabapentin 300 MG capsule    NEURONTIN    180 capsule    Two tablets in the am and one tablet in the afternoon and evening    Chemotherapy-induced neuropathy (H)       latanoprost 0.005 % ophthalmic solution    XALATAN    1 Bottle    Place 1 drop into both eyes At Bedtime    Cancer of distal third of esophagus (H)       lidocaine-prilocaine cream    EMLA    30 g    Apply topically as needed for moderate pain    Cancer of distal third of esophagus (H)       * LORazepam 0.5 MG tablet    ATIVAN    30 tablet    Take 1 tablet (0.5 mg) by mouth every 4 hours as needed (Anxiety, Nausea/Vomiting or Sleep)    Cancer of distal third of esophagus (H)       * LORazepam 0.5 MG tablet    ATIVAN    30 tablet    Take 1 tablet (0.5 mg) by mouth every 4 hours as needed (Anxiety, Nausea/Vomiting or Sleep)    Cancer of distal third of esophagus (H)       Multi-vitamin Tabs tablet      Take 1 tablet by mouth daily        omeprazole 40 MG capsule    priLOSEC    90 capsule    Take 1 capsule (40 mg) by mouth daily    Cancer of distal third of esophagus (H)       ondansetron 8 MG tablet    ZOFRAN    30 tablet    Take 1 tablet (8 mg) by mouth every 8 hours as needed (Nausea/Vomiting)    Cancer of distal third of esophagus (H)       * prochlorperazine 10 MG tablet    COMPAZINE    30 tablet    Take 1 tablet (10 mg) by mouth every 6 hours as needed (Nausea/Vomiting)    Cancer of distal third of esophagus (H)       * prochlorperazine 10 MG tablet    COMPAZINE    30 tablet    Take 1 tablet (10 mg) by mouth every 6 hours as needed (Nausea/Vomiting)    Cancer of distal third of esophagus (H)       tadalafil 5 MG tablet    CIALIS    20 tablet    Take 2 tablets (10 mg) by mouth every 24 hours As needed for sexual activity     Malignant neoplasm of lower third of esophagus (H), Drug-induced erectile dysfunction       timolol 0.5 % ophthalmic solution    TIMOPTIC     Place into both eyes daily    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 Read the directions carefully, and ask your doctor or other care provider to review them with you.

## 2018-08-07 NOTE — PROGRESS NOTES
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 His tumor is positive for cytokeratin 20, negative for P63 and CK7, and HER2 is negative. He started on FOLFOX (5FU/oxaliplatin) on 5/15/2017. He had a delay in treatment from 9/5-10/2/17 due to work related injury.    He had an excellent response by imaging throughout the summer 2017.    He a mixed response by imaging in late fall 2017.    In January 2018, he was switched to taxol and cyramza - had slight progression and neuropathy and clinical trial became available.       In March 2018, he was started on the [facility] Clinical Trial with crizotinib.  (MET amplification) He remained on crizotinib from March - July 2018.    INTERVAL HISTORY: Levi comes in today for followup.  Overall, Reuben is doing reasonably well.  He believes his neuropathy is stable.  He has some numbness in his feet bilaterally.  He has had no problems with balance or falls.  No numbness or tingling in his fingertips.  He reports no fevers or chills.  He thinks that his dysphagia has improved.  He has also monitored his diet.  It is unclear to him whether or not this is diet related or if this is actually improvement of his tumor.      No nausea or vomiting.  He had loose stools today.  No constipation.      His 10-point review of systems is otherwise negative.        PHYSICAL EXAMINATION  /85 (BP Location: Right arm, Patient Position: Sitting, Cuff Size: Adult Large)  Pulse 81  Temp 98.7  F (37.1  C) (Oral)  Resp 18  Wt (!) 168.6 kg (371 lb 9.6 oz)  SpO2 97%  BMI 42.95 kg/m2 /90  Wt Readings from Last 4 Encounters:   08/07/18 (!) 168.6 kg (371 lb 9.6 oz)   08/01/18 (!) 165.6 kg (365 lb 1.6 oz)   07/24/18 (!) 163.3 kg (360 lb)   07/17/18 (!) 163.3 kg (360 lb)     Constitutional:  Alert, oriented male in no visible distress.  Eyes: PERRL. Anicteric sclerae.  ENT/Mouth: OM moist and pink without lesions or thrush.  CV: RRR  Resp: CTAB throughout  Abdomen: Soft, non-tender, non-distended. Obese. Bowel sounds present. Unable to palpate liver or spleen.   Extremities: 1+ pitting edema    Skin: Warm, dry.   Lymph: No cervical or supraclavicular lymphadenopathy appreciated.   Neuro: CN II-XII grossly intact.  Absent reflexed at knees bilaterally    ECOG PS: 1    LABS:  Lab Results   Component Value Date    WBC 1.2 08/07/2018     Lab Results   Component Value Date    RBC 4.08 08/07/2018     Lab Results   Component Value Date    HGB 12.0 08/07/2018     Lab Results   Component Value Date    HCT 37.0 08/07/2018     No components found for: MCT  Lab Results   Component Value Date    MCV 91 08/07/2018     Lab Results   Component Value Date    MCH 29.4 08/07/2018     Lab Results   Component Value Date    MCHC 32.4 08/07/2018     Lab Results   Component Value Date    RDW 14.0 08/07/2018     Lab Results   Component Value Date     08/07/2018       Recent Labs   Lab Test  07/24/18   1516  07/17/18   1250   NA  140  143   POTASSIUM  4.2  4.5   CHLORIDE  108  109   CO2  26  24   ANIONGAP  6  10   GLC  100*  95   BUN  10  15   CR  0.91  1.01   NIDHI  8.4*  8.6     Liver Function Studies -   Recent Labs   Lab Test  07/24/18   1516   PROTTOTAL  7.4   ALBUMIN  3.2*   BILITOTAL  0.4   ALKPHOS  96   AST  22   ALT  30       [employer] testing - MET amplification and KRAS amplification; results scanned in computer      IMAGING:  CT CAP 7/17/18  IMPRESSION: In this patient with esophageal cancer, there is evidence  of progression of disease:  1a. Increased size of the ill-defined mass which abuts the esophageal  junction and diaphragmatic andrey extending into the retroperitoneum and  central mesentery.  1b. Increased size of a presumably paraesophageal lymph node.  1c. Worsening retroperitoneal lymphadenopathy  including two dominant  peripancreatic/periceliac nodes. Additional unchanged enlarged  mediastinal adenopathy.  3. Unchanged compression deformities of T9, T12, and L2.    IMPRESSION/PLAN:  1. Metastatic esophogeal adenocarcinoma. He has had treatment with FOLFOX and then transitioned to Taxol with ramicurimab.  He was then on crizotinib on the [employer] Match study.  He progressed on this and returned on taxol/cyramza.  He is  Tolerating this well.  He has neutropenia today and will not receive therapy.  We discussed having him move to treatment day 1, 8 every 21 days.      He will be reimaged in Sept.      His [employer] One testing did return with low positive PDL 1 status, indicating he could be a candidate for Keytruda.  Will also look into cabozantinib insurance coverage.    Also discussed that he should continue to enjoy his time and complete any potential travel plans he has, possibly a trip to [place], in the near future given that he is currently feeling well.    2. Edema -  stable     3. He has a history of pulmonary embolism.  He is on Lovenox twice daily.  He has progressed through Xarelto in the past.       4. He does have baseline neuropathy.  Gabapentin is maintained at 600/300/600 mg daily.     5. Sexual dysfunction - trial of cialis sent out to pharmacy.    He understands his therapies are not curative intent, but rather palliative.    Kadi Dee

## 2018-08-08 ENCOUNTER — TELEPHONE (OUTPATIENT)
Dept: ONCOLOGY | Facility: CLINIC | Age: 58
End: 2018-08-08

## 2018-08-08 NOTE — TELEPHONE ENCOUNTER
Pt called in to triage to transfer his lorazepam script to [facility] in [place]. Per pharmacy, pt did not  original fill so unable to transfer but will cancel profiled script at [facility] and can call in new order to [facility]. Writer gave verbal order to [facility], pt informed called in#30 tablets with #2 remaining refills, he verbalized understanding.

## 2018-08-09 ENCOUNTER — TELEPHONE (OUTPATIENT)
Dept: ONCOLOGY | Facility: CLINIC | Age: 58
End: 2018-08-09

## 2018-08-09 RX ORDER — SODIUM CHLORIDE 9 MG/ML
1000 INJECTION, SOLUTION INTRAVENOUS CONTINUOUS PRN
Status: CANCELLED
Start: 2018-08-28

## 2018-08-09 RX ORDER — HEPARIN SODIUM (PORCINE) LOCK FLUSH IV SOLN 100 UNIT/ML 100 UNIT/ML
500 SOLUTION INTRAVENOUS EVERY 8 HOURS
Status: CANCELLED
Start: 2018-08-17

## 2018-08-09 RX ORDER — MEPERIDINE HYDROCHLORIDE 25 MG/ML
25 INJECTION INTRAMUSCULAR; INTRAVENOUS; SUBCUTANEOUS EVERY 30 MIN PRN
Status: CANCELLED | OUTPATIENT
Start: 2018-09-04

## 2018-08-09 RX ORDER — DIPHENHYDRAMINE HCL 25 MG
50 CAPSULE ORAL ONCE
Status: CANCELLED
Start: 2018-09-04

## 2018-08-09 RX ORDER — ALBUTEROL SULFATE 90 UG/1
1-2 AEROSOL, METERED RESPIRATORY (INHALATION)
Status: CANCELLED
Start: 2018-08-17

## 2018-08-09 RX ORDER — HEPARIN SODIUM (PORCINE) LOCK FLUSH IV SOLN 100 UNIT/ML 100 UNIT/ML
500 SOLUTION INTRAVENOUS EVERY 8 HOURS
Status: CANCELLED
Start: 2018-08-28

## 2018-08-09 RX ORDER — DIPHENHYDRAMINE HYDROCHLORIDE 50 MG/ML
50 INJECTION INTRAMUSCULAR; INTRAVENOUS
Status: CANCELLED
Start: 2018-09-04

## 2018-08-09 RX ORDER — MEPERIDINE HYDROCHLORIDE 25 MG/ML
25 INJECTION INTRAMUSCULAR; INTRAVENOUS; SUBCUTANEOUS EVERY 30 MIN PRN
Status: CANCELLED | OUTPATIENT
Start: 2018-08-17

## 2018-08-09 RX ORDER — ALBUTEROL SULFATE 90 UG/1
1-2 AEROSOL, METERED RESPIRATORY (INHALATION)
Status: CANCELLED
Start: 2018-09-04

## 2018-08-09 RX ORDER — DIPHENHYDRAMINE HCL 25 MG
50 CAPSULE ORAL ONCE
Status: CANCELLED
Start: 2018-08-17

## 2018-08-09 RX ORDER — ALBUTEROL SULFATE 0.83 MG/ML
2.5 SOLUTION RESPIRATORY (INHALATION)
Status: CANCELLED | OUTPATIENT
Start: 2018-08-17

## 2018-08-09 RX ORDER — HEPARIN SODIUM (PORCINE) LOCK FLUSH IV SOLN 100 UNIT/ML 100 UNIT/ML
500 SOLUTION INTRAVENOUS EVERY 8 HOURS
Status: CANCELLED
Start: 2018-09-04

## 2018-08-09 RX ORDER — SODIUM CHLORIDE 9 MG/ML
1000 INJECTION, SOLUTION INTRAVENOUS CONTINUOUS PRN
Status: CANCELLED
Start: 2018-09-04

## 2018-08-09 RX ORDER — DIPHENHYDRAMINE HCL 25 MG
50 CAPSULE ORAL ONCE
Status: CANCELLED
Start: 2018-08-28

## 2018-08-09 RX ORDER — LORAZEPAM 2 MG/ML
0.5 INJECTION INTRAMUSCULAR EVERY 4 HOURS PRN
Status: CANCELLED
Start: 2018-08-17

## 2018-08-09 RX ORDER — ALBUTEROL SULFATE 0.83 MG/ML
2.5 SOLUTION RESPIRATORY (INHALATION)
Status: CANCELLED | OUTPATIENT
Start: 2018-08-28

## 2018-08-09 RX ORDER — ALBUTEROL SULFATE 0.83 MG/ML
2.5 SOLUTION RESPIRATORY (INHALATION)
Status: CANCELLED | OUTPATIENT
Start: 2018-09-04

## 2018-08-09 RX ORDER — EPINEPHRINE 1 MG/ML
0.3 INJECTION, SOLUTION INTRAMUSCULAR; SUBCUTANEOUS EVERY 5 MIN PRN
Status: CANCELLED | OUTPATIENT
Start: 2018-08-17

## 2018-08-09 RX ORDER — METHYLPREDNISOLONE SODIUM SUCCINATE 125 MG/2ML
125 INJECTION, POWDER, LYOPHILIZED, FOR SOLUTION INTRAMUSCULAR; INTRAVENOUS
Status: CANCELLED
Start: 2018-08-17

## 2018-08-09 RX ORDER — METHYLPREDNISOLONE SODIUM SUCCINATE 125 MG/2ML
125 INJECTION, POWDER, LYOPHILIZED, FOR SOLUTION INTRAMUSCULAR; INTRAVENOUS
Status: CANCELLED
Start: 2018-09-04

## 2018-08-09 RX ORDER — METHYLPREDNISOLONE SODIUM SUCCINATE 125 MG/2ML
125 INJECTION, POWDER, LYOPHILIZED, FOR SOLUTION INTRAMUSCULAR; INTRAVENOUS
Status: CANCELLED
Start: 2018-08-28

## 2018-08-09 RX ORDER — LORAZEPAM 2 MG/ML
0.5 INJECTION INTRAMUSCULAR EVERY 4 HOURS PRN
Status: CANCELLED
Start: 2018-09-04

## 2018-08-09 RX ORDER — EPINEPHRINE 1 MG/ML
0.3 INJECTION, SOLUTION INTRAMUSCULAR; SUBCUTANEOUS EVERY 5 MIN PRN
Status: CANCELLED | OUTPATIENT
Start: 2018-08-28

## 2018-08-09 RX ORDER — DIPHENHYDRAMINE HYDROCHLORIDE 50 MG/ML
50 INJECTION INTRAMUSCULAR; INTRAVENOUS
Status: CANCELLED
Start: 2018-08-17

## 2018-08-09 RX ORDER — EPINEPHRINE 1 MG/ML
0.3 INJECTION, SOLUTION INTRAMUSCULAR; SUBCUTANEOUS EVERY 5 MIN PRN
Status: CANCELLED | OUTPATIENT
Start: 2018-09-04

## 2018-08-09 RX ORDER — EPINEPHRINE 0.3 MG/.3ML
0.3 INJECTION SUBCUTANEOUS EVERY 5 MIN PRN
Status: CANCELLED | OUTPATIENT
Start: 2018-08-17

## 2018-08-09 RX ORDER — MEPERIDINE HYDROCHLORIDE 25 MG/ML
25 INJECTION INTRAMUSCULAR; INTRAVENOUS; SUBCUTANEOUS EVERY 30 MIN PRN
Status: CANCELLED | OUTPATIENT
Start: 2018-08-28

## 2018-08-09 RX ORDER — EPINEPHRINE 0.3 MG/.3ML
0.3 INJECTION SUBCUTANEOUS EVERY 5 MIN PRN
Status: CANCELLED | OUTPATIENT
Start: 2018-09-04

## 2018-08-09 RX ORDER — DIPHENHYDRAMINE HYDROCHLORIDE 50 MG/ML
50 INJECTION INTRAMUSCULAR; INTRAVENOUS
Status: CANCELLED
Start: 2018-08-28

## 2018-08-09 RX ORDER — SODIUM CHLORIDE 9 MG/ML
1000 INJECTION, SOLUTION INTRAVENOUS CONTINUOUS PRN
Status: CANCELLED
Start: 2018-08-17

## 2018-08-09 RX ORDER — EPINEPHRINE 0.3 MG/.3ML
0.3 INJECTION SUBCUTANEOUS EVERY 5 MIN PRN
Status: CANCELLED | OUTPATIENT
Start: 2018-08-28

## 2018-08-09 RX ORDER — ALBUTEROL SULFATE 90 UG/1
1-2 AEROSOL, METERED RESPIRATORY (INHALATION)
Status: CANCELLED
Start: 2018-08-28

## 2018-08-09 RX ORDER — LORAZEPAM 2 MG/ML
0.5 INJECTION INTRAMUSCULAR EVERY 4 HOURS PRN
Status: CANCELLED
Start: 2018-08-28

## 2018-08-09 NOTE — TELEPHONE ENCOUNTER
Per Patient's request,  completed and faxed [facility] referral for lodging dates 8/20 - 8/22 and 9/4 - 9/6. [facility] will contact Patient directly for confirmation of reservation.  will continue to provide support as needed.      Nieves Casper (Martens), CARRIE, MSW   - [facility]  Phone: [phone]  Pager: [phone]  8/9/2018

## 2018-08-13 ENCOUNTER — TELEPHONE (OUTPATIENT)
Dept: ONCOLOGY | Facility: CLINIC | Age: 58
End: 2018-08-13

## 2018-08-13 ENCOUNTER — TRANSFERRED RECORDS (OUTPATIENT)
Dept: HEALTH INFORMATION MANAGEMENT | Facility: CLINIC | Age: 58
End: 2018-08-13

## 2018-08-13 ENCOUNTER — CARE COORDINATION (OUTPATIENT)
Dept: ONCOLOGY | Facility: CLINIC | Age: 58
End: 2018-08-13

## 2018-08-13 DIAGNOSIS — C15.5 CANCER OF DISTAL THIRD OF ESOPHAGUS (H): Primary | ICD-10-CM

## 2018-08-13 LAB
BASOPHILS # BLD AUTO: 0 10*3/UL
BASOPHILS NFR BLD AUTO: 0.6 %
EOSINOPHIL # BLD AUTO: 0 10*3/UL
EOSINOPHIL NFR BLD AUTO: 1.2 %
ERYTHROCYTE [DISTWIDTH] IN BLOOD BY AUTOMATED COUNT: 15 %
HCT VFR BLD AUTO: 37.9 %
HEMOGLOBIN: 12.7 G/DL (ref 13.3–17.7)
LYMPHOCYTES # BLD AUTO: 0.8 10*3/UL
LYMPHOCYTES NFR BLD AUTO: 45.7 %
MCH RBC QN AUTO: 30.1 PG
MCHC RBC AUTO-ENTMCNC: 33.5 G/DL
MCV RBC AUTO: 89.8 FL
MONOCYTES # BLD AUTO: 0.2 10*3/UL
MONOCYTES NFR BLD AUTO: 9.8 %
NEUTROPHILS # BLD AUTO: 0.7 10*3/UL
NEUTROPHILS NFR BLD AUTO: 42.7 %
PLATELET COUNT - QUEST: 136 10^9/L (ref 150–450)
RBC # BLD AUTO: 4.22 10^12/L
WBC # BLD AUTO: 1.7 10^9/L

## 2018-08-13 NOTE — TELEPHONE ENCOUNTER
Spoke to pt regarding new plan per Dr Dee and Trinh. He will receive Neupogen today, tomorrow and next day. He will have CBC drawn 8/16. Will reschedule tomorrow's chemo to 8/17. Pt aware. Arrangements made and orders faxed to [facility]. [phone]

## 2018-08-13 NOTE — PROGRESS NOTES
"Spoke with patient to review labs drawn today. ANC does not meet treatment parameter, 0.7. Per Dr. Dee, needs to be 1.2. Patient is aware. He will have labs drawn locally at [facility] next Monday (8/20/18). We will review labs and discuss with him. Reviewed neutropenic precautions. Patient voiced understanding of all instructions.     New orders faxed to [facility], [phone]. Orders faxed with \"OK\" results via [employer].   "

## 2018-08-16 ENCOUNTER — TRANSFERRED RECORDS (OUTPATIENT)
Dept: HEALTH INFORMATION MANAGEMENT | Facility: CLINIC | Age: 58
End: 2018-08-16

## 2018-08-16 ENCOUNTER — TELEPHONE (OUTPATIENT)
Dept: ONCOLOGY | Facility: CLINIC | Age: 58
End: 2018-08-16

## 2018-08-16 NOTE — TELEPHONE ENCOUNTER
Per Patient's request,  completed [facility] request for lodging dates 8/16 - 8/17. [facility] will contact Patient for confirmation of reservation.  will continue to provide support as needed.        Nieves Casper (Martens), [facility], MSW   - [facility]  Phone: [phone]  Pager: [phone]  8/16/2018

## 2018-08-17 ENCOUNTER — INFUSION THERAPY VISIT (OUTPATIENT)
Dept: ONCOLOGY | Facility: CLINIC | Age: 58
End: 2018-08-17
Attending: INTERNAL MEDICINE
Payer: COMMERCIAL

## 2018-08-17 ENCOUNTER — CARE COORDINATION (OUTPATIENT)
Dept: ONCOLOGY | Facility: CLINIC | Age: 58
End: 2018-08-17

## 2018-08-17 VITALS
OXYGEN SATURATION: 95 % | SYSTOLIC BLOOD PRESSURE: 129 MMHG | DIASTOLIC BLOOD PRESSURE: 91 MMHG | TEMPERATURE: 97.9 F | WEIGHT: 315 LBS | BODY MASS INDEX: 42.81 KG/M2 | HEART RATE: 84 BPM | RESPIRATION RATE: 18 BRPM

## 2018-08-17 DIAGNOSIS — C15.5 CANCER OF DISTAL THIRD OF ESOPHAGUS (H): Primary | ICD-10-CM

## 2018-08-17 LAB
ABSOLUTE BASOPHILS (EXTERNAL): 0
ALBUMIN SERPL-MCNC: 3.1 G/DL (ref 3.4–5)
ALBUMIN UR-MCNC: ABNORMAL MG/DL
ALP SERPL-CCNC: 104 U/L (ref 40–150)
ALT SERPL W P-5'-P-CCNC: 20 U/L (ref 0–70)
AST SERPL W P-5'-P-CCNC: 19 U/L (ref 0–45)
BASOPHILS NFR BLD AUTO: 0.2 %
BILIRUB DIRECT SERPL-MCNC: 0.1 MG/DL (ref 0–0.2)
BILIRUB SERPL-MCNC: 0.4 MG/DL (ref 0.2–1.3)
EOSINOPHIL # BLD AUTO: 0 10*3/UL
EOSINOPHIL NFR BLD AUTO: 0.4 %
ERYTHROCYTE [DISTWIDTH] IN BLOOD BY AUTOMATED COUNT: 15.4 %
HCT VFR BLD AUTO: 39 %
HEMOGLOBIN: 13.1 G/DL (ref 13.3–17.7)
LYMPHOCYTES # BLD AUTO: 1 10*3/UL
LYMPHOCYTES NFR BLD AUTO: 21.9 %
MCH RBC QN AUTO: 30 PG
MCHC RBC AUTO-ENTMCNC: 33.6 G/DL
MCV RBC AUTO: 89.2 FL
MONOCYTES # BLD AUTO: 0.9 10*3/UL
MONOCYTES NFR BLD AUTO: 19 %
NEUTROPHILS # BLD AUTO: 2.7 10*3/UL
NEUTROPHILS NFR BLD AUTO: 58.5 %
PLATELET COUNT - QUEST: 88 10^9/L (ref 150–450)
PROT SERPL-MCNC: 6.7 G/DL (ref 6.8–8.8)
PROTEIN, TOTAL, RANDOM URINE - QUEST: 27.2
RBC # BLD AUTO: 4.37 10^12/L
WBC # BLD AUTO: 4.6 10^9/L

## 2018-08-17 PROCEDURE — 96413 CHEMO IV INFUSION 1 HR: CPT

## 2018-08-17 PROCEDURE — 81003 URINALYSIS AUTO W/O SCOPE: CPT | Performed by: INTERNAL MEDICINE

## 2018-08-17 PROCEDURE — 25000125 ZZHC RX 250: Mod: ZF | Performed by: INTERNAL MEDICINE

## 2018-08-17 PROCEDURE — G0463 HOSPITAL OUTPT CLINIC VISIT: HCPCS | Mod: 25

## 2018-08-17 PROCEDURE — 96367 TX/PROPH/DG ADDL SEQ IV INF: CPT

## 2018-08-17 PROCEDURE — 25000132 ZZH RX MED GY IP 250 OP 250 PS 637: Mod: ZF | Performed by: INTERNAL MEDICINE

## 2018-08-17 PROCEDURE — 80076 HEPATIC FUNCTION PANEL: CPT | Performed by: INTERNAL MEDICINE

## 2018-08-17 PROCEDURE — 25000128 H RX IP 250 OP 636: Mod: ZF | Performed by: INTERNAL MEDICINE

## 2018-08-17 PROCEDURE — 96417 CHEMO IV INFUS EACH ADDL SEQ: CPT

## 2018-08-17 RX ORDER — DIPHENHYDRAMINE HCL 25 MG
50 CAPSULE ORAL ONCE
Status: COMPLETED | OUTPATIENT
Start: 2018-08-17 | End: 2018-08-17

## 2018-08-17 RX ORDER — HEPARIN SODIUM (PORCINE) LOCK FLUSH IV SOLN 100 UNIT/ML 100 UNIT/ML
500 SOLUTION INTRAVENOUS EVERY 8 HOURS
Status: DISCONTINUED | OUTPATIENT
Start: 2018-08-17 | End: 2018-08-17 | Stop reason: HOSPADM

## 2018-08-17 RX ADMIN — SODIUM CHLORIDE, PRESERVATIVE FREE 500 UNITS: 5 INJECTION INTRAVENOUS at 12:34

## 2018-08-17 RX ADMIN — SODIUM CHLORIDE 1300 MG: 9 INJECTION, SOLUTION INTRAVENOUS at 10:28

## 2018-08-17 RX ADMIN — PACLITAXEL 240 MG: 6 INJECTION, SOLUTION INTRAVENOUS at 11:33

## 2018-08-17 RX ADMIN — DIPHENHYDRAMINE HYDROCHLORIDE 50 MG: 25 CAPSULE ORAL at 10:01

## 2018-08-17 RX ADMIN — DEXAMETHASONE SODIUM PHOSPHATE 20 MG: 10 INJECTION, SOLUTION INTRAMUSCULAR; INTRAVENOUS at 09:53

## 2018-08-17 RX ADMIN — FAMOTIDINE 20 MG: 20 INJECTION, SOLUTION INTRAVENOUS at 09:55

## 2018-08-17 RX ADMIN — SODIUM CHLORIDE 250 ML: 9 INJECTION, SOLUTION INTRAVENOUS at 09:53

## 2018-08-17 NOTE — PROGRESS NOTES
Infusion Nursing Note:  Reuben Padilla presents today for Cycle 2 Day 1 Cyramza and Taxol.    Patient seen by provider today: No   present during visit today: Not Applicable.    Note: Patient reports to infusion feeling well overall. He offers no new complaints.    Due to neutropenia on 8/13/2018, patient received Neupogen x3 on 8/13, 8/14, and 8/15 before repeating labs. Patient had CBC with differential drawn yesterday at [facility] in [place], per patient's preference. CBC results met parameters. Urine protein and hepatic panel were NOT obtained at that time. Per Katalina, we DO need to obtain these before starting treatment today. Urine protein and hepatic panel were collected and sent per this RN, with acceptable results WNL. Per Katalina, open order for 24-hour urine protein is not needed at this time.     Because treatment was cancelled on Tuesday 8/14, Levi's treatment days would be changed as today would start his second cycle. Per Katalina, patient will be changing to a 21-day cycle, with two weeks of treatment followed by one week off. With collaboration from Katalina and pharmacy, we were able to move next treatment day (due 8/24) to the following week on 8/28, to accommodate the patient's schedule and maintain safety with treatment. He will be receiving Neupogen locally ([facility] in [place]) starting Monday for 3 doses (8/20, 8/21, 8/22), as well as labs locally at on 8/27. Katalina entered these orders into treatment plan and faxed to [facility] in [place] via ERIN Tang.     In Basket sent to Dr. Dee to adjust the plan in the springboard accordingly. Additionally, patient is scheduled on September 4th for his scans and a visit with Dr. Dee. Due to schedule adjustments, this is his week off from infusion. Because he drives a long distance, the patient requested to change his scans and visit with Dr. Dee to the following week on 9/11 when he will be here for his infusion appointment. In  Basket also sent to Dr. Dee to address re-scheduling the scan and visit.    Intravenous Access:  Implanted Port accessed yesterday by [facility] in [place].    Treatment Conditions:  Lab Results   Component Value Date    HGB 13.1 08/16/2018     Lab Results   Component Value Date    WBC 4.6 08/16/2018      Lab Results   Component Value Date    ANEU 2.7 08/16/2018     Lab Results   Component Value Date    PLT 88 08/16/2018     08/07/2018      Lab Results   Component Value Date     07/24/2018                   Lab Results   Component Value Date    POTASSIUM 4.2 07/24/2018           Lab Results   Component Value Date    MAG 1.9 07/17/2018            Lab Results   Component Value Date    CR 0.91 07/24/2018                   Lab Results   Component Value Date    NIDHI 8.4 07/24/2018                Lab Results   Component Value Date    BILITOTAL 0.4 08/17/2018           Lab Results   Component Value Date    ALBUMIN 3.1 08/17/2018                    Lab Results   Component Value Date    ALT 20 08/17/2018           Lab Results   Component Value Date    AST 19 08/17/2018     Lab Results   Component Value Date    AST 19 08/17/2018     Lab Results   Component Value Date    ALT 20 08/17/2018     Lab Results   Component Value Date    BILITOTAL 0.4 08/17/2018     Lab Results   Component Value Date    ALBUMIN 3.1 08/17/2018     Lab Results   Component Value Date    PROTTOTAL 6.7 08/17/2018      Lab Results   Component Value Date    ALKPHOS 104 08/17/2018       Results reviewed, labs MET treatment parameters, ok to proceed with treatment. Katalina notified of results.  Urine protein: Trace.    Post Infusion Assessment:  Patient tolerated infusion without incident.  Blood return noted pre and post infusion.  Site patent and intact, free from redness, edema or discomfort.  No evidence of extravasations.  Access discontinued per protocol.    Discharge Plan:   Patient declined prescription refills.  Discharge  instructions reviewed with: Patient.  Patient and/or family verbalized understanding of discharge instructions and all questions answered.  Copy of AVS reviewed with patient and/or family.  Patient will return 8/28/2018 for next appointment.  Patient discharged in stable condition accompanied by: self.  Departure Mode: Ambulatory.  Face to Face time: 60 minutes.    Audi Galindo RN

## 2018-08-17 NOTE — PATIENT INSTRUCTIONS
Contact Numbers    [facility] Main Line: [phone]  [facility] Triage and after hours / weekends / holidays:  [phone]      Please call the triage or after hours line if you experience a temperature greater than or equal to 100.5, shaking chills, have uncontrolled nausea, vomiting and/or diarrhea, dizziness, shortness of breath, chest pain, bleeding, unexplained bruising, or if you have any other new/concerning symptoms, questions or concerns.      If you are having any concerning symptoms or wish to speak to a provider before your next infusion visit, please call your care coordinator or triage to notify them so we can adequately serve you.     If you need a refill on a narcotic prescription or other medication, please call before your infusion appointment.                   August 2018 Sunday Monday Tuesday Wednesday Thursday Friday Saturday 1     [facility]    9:45 AM   (15 min.)    [facility] DRAW   [facility] Lab Draw     [facility] RETURN   10:15 AM   (50 min.)   Amparo Sow PA   [facility] ONC INFUSION 120   11:30 AM   (120 min.)    ONCOLOGY INFUSION   [facility] 2     3     4       5     6     7     [facility]   10:15 AM   (15 min.)    [facility] DRAW   [facility] Lab Draw     [facility] RETURN   10:45 AM   (30 min.)   Kadi Dee MD   [facility] ONC INFUSION 120   11:30 AM   (120 min.)    ONCOLOGY INFUSION   [facility] 8     9     10     11       12     13     14     15     16     17     [facility] ONC INFUSION 120    7:00 AM   (120 min.)    ONCOLOGY INFUSION   [facility] 18       19     20     21     22     23     24     25       26     27     28     [facility] RETURN    9:05 AM   (50 min.)   Katalina Ramirez PA-C   [facility] ONC INFUSION 120   10:00 AM   (120 min.)    ONCOLOGY INFUSION   [facility]  Cancer Clinic 29     30     31  Happy Birthday!                     September 2018 Sunday Monday Tuesday Wednesday Thursday Friday Saturday                                 1       2     3     4     [facility]    1:15 PM   (15 min.)   [facility]   [facility] Lab Draw     CT CHEST/ABDOMEN/PELVIS W    1:40 PM   (20 min.)   UCCT2   [facility] CT     [facility] RETURN    3:15 PM   (30 min.)   Kadi Dee MD   [facility] 5     6     7     8       9     10     11     [facility] ONC INFUSION 120   12:30 PM   (120 min.)   [facility] ONCOLOGY INFUSION   [facility] 12     13     14     15       16     17     18     19     20     21     22       23     24     25     26     27     28     29       30                                                Lab Results:  Recent Results (from the past 12 hour(s))   Hepatic panel    Collection Time: 08/17/18  8:15 AM   Result Value Ref Range    Bilirubin Direct 0.1 0.0 - 0.2 mg/dL    Bilirubin Total 0.4 0.2 - 1.3 mg/dL    Albumin 3.1 (L) 3.4 - 5.0 g/dL    Protein Total 6.7 (L) 6.8 - 8.8 g/dL    Alkaline Phosphatase 104 40 - 150 U/L    ALT 20 0 - 70 U/L    AST 19 0 - 45 U/L   Protein qualitative urine    Collection Time: 08/17/18  8:50 AM   Result Value Ref Range    Protein Albumin Urine Trace (A) NEG^Negative mg/dL

## 2018-08-17 NOTE — PROGRESS NOTES
Orders for neupogen x 3 days, starting on 8/20,8/21, 8/22 and CBC on 8/27 sent to [facility]. ([phone]) Ok result received via RightFax. Patient is aware of plan.

## 2018-08-17 NOTE — MR AVS SNAPSHOT
After Visit Summary   8/17/2018    Reuben Padilla    MRN: [id number]           Patient Information     Date Of Birth          1960        Visit Information        Provider Department      8/17/2018 7:00 AM UC 10 ATC;  ONCOLOGY INFUSION [facility]        Today's Diagnoses     Cancer of distal third of esophagus (H)    -  1      Care Instructions    Contact Numbers    [facility] Main Line: [phone]  [facility] Triage and after hours / weekends / holidays:  [phone]      Please call the triage or after hours line if you experience a temperature greater than or equal to 100.5, shaking chills, have uncontrolled nausea, vomiting and/or diarrhea, dizziness, shortness of breath, chest pain, bleeding, unexplained bruising, or if you have any other new/concerning symptoms, questions or concerns.      If you are having any concerning symptoms or wish to speak to a provider before your next infusion visit, please call your care coordinator or triage to notify them so we can adequately serve you.     If you need a refill on a narcotic prescription or other medication, please call before your infusion appointment.                   August 2018 Sunday Monday Tuesday Wednesday Thursday Friday Saturday 1     [facility] D[facility]    9:45 AM   (15 min.)    [facility] DRAW   [facility] Lab Draw     [facility] RETURN   10:15 AM   (50 min.)   Amparo Sow PA   [facility] ONC INFUSION 120   11:30 AM   (120 min.)    ONCOLOGY INFUSION   [facility] 2     3     4       5     6     7     [facility] DRAW   10:15 AM   (15 min.)    [facility] DRAW   [facility] Lab Draw     [facility] RETURN   10:45 AM   (30 min.)   Kadi Dee MD   [facility] ONC INFUSION 120   11:30 AM   (120 min.)    ONCOLOGY INFUSION   [facility] 8     9     10     11       12     13     14     15      16     17     [facility] ONC INFUSION 120    7:00 AM   (120 min.)   [facility] ONCOLOGY INFUSION   [facility] 18       19     20     21     22     23     24     25       26     27     28     [facility] RETURN    9:05 AM   (50 min.)   Katalina Ramirez PA-C   [facility]     [facility] ONC INFUSION 120   10:00 AM   (120 min.)   [facility] ONCOLOGY INFUSION   [facility] 29     30     31  Happy Birthday!                     September 2018 Sunday Monday Tuesday Wednesday Thursday Friday Saturday                                 1       2     3     4     [facility] D[facility]    1:15 PM   (15 min.)   [facility] DRAW   [facility] Lab Draw     CT CHEST/ABDOMEN/PELVIS W    1:40 PM   (20 min.)   UCCT2   [facility] CT     [facility] RETURN    3:15 PM   (30 min.)   Kadi Dee MD   [facility] 5     6     7     8       9     10     11     [facility] ONC INFUSION 120   12:30 PM   (120 min.)   [facility] ONCOLOGY INFUSION   [facility] 12     13     14     15       16     17     18     19     20     21     22       23     24     25     26     27     28     29       30                                                Lab Results:  Recent Results (from the past 12 hour(s))   Hepatic panel    Collection Time: 08/17/18  8:15 AM   Result Value Ref Range    Bilirubin Direct 0.1 0.0 - 0.2 mg/dL    Bilirubin Total 0.4 0.2 - 1.3 mg/dL    Albumin 3.1 (L) 3.4 - 5.0 g/dL    Protein Total 6.7 (L) 6.8 - 8.8 g/dL    Alkaline Phosphatase 104 40 - 150 U/L    ALT 20 0 - 70 U/L    AST 19 0 - 45 U/L   Protein qualitative urine    Collection Time: 08/17/18  8:50 AM   Result Value Ref Range    Protein Albumin Urine Trace (A) NEG^Negative mg/dL               Follow-ups after your visit        Your next 10 appointments already scheduled     Aug 28, 2018  9:20 AM CDT   (Arrive by 9:05 AM)   Return Visit with Katalina Ramirez PA-C   [facility] ([facility]  [facility])    [address]  [address]  [place] [address]   [phone]            Aug 28, 2018 10:00 AM CDT   Infusion 120 with UC ONCOLOGY INFUSION, UC 23 ATC   [facility] ([facility])    [address]  [address]  [place] [address]   [phone]            Sep 04, 2018  1:15 PM CDT   [facility] Draw with  [facility] DRAW   [facility] Lab Draw ([facility])    [address]  [address]  [place] [address]   [phone]            Sep 04, 2018  1:40 PM CDT   CT CHEST/ABDOMEN/PELVIS W CONTRAST with UCCT2   [facility] CT ([facility])    [address] [place] [address]  1st Floor  [place] [address]   [phone]           Please bring any scans or X-rays taken at other hospitals, if similar tests were done. Also bring a list of your medicines, including vitamins, minerals and over-the-counter drugs. It is safest to leave personal items at home.  Be sure to tell your doctor:   If you have any allergies.   If there s any chance you are pregnant.   If you are breastfeeding.  How to prepare:   Do not eat or drink for 2 hours before your exam. If you need to take medicine, you may take it with small sips of water. (We may ask you to take liquid medicine as well.)   Please wear loose clothing, such as a sweat suit or jogging clothes. Avoid snaps, zippers and other metal. We may ask you to undress and put on a hospital gown.  Please arrive 30 minutes early for your CT. Once in the department you might be asked to drink water 15-20 minutes prior to your exam.  If indicated you may be asked to drink an oral contrast in advance of your CT.  If this is the case, the imaging team will let you know or be in contact with you prior to your appointment  Patients over 70 or patients with diabetes or kidney problems:   If you haven t had a blood test (creatinine  test) within the last 30 days, the Cardiologist/Radiologist may require you to get this test prior to your exam.  If you have diabetes:   Continue to take your metformin medication on the day of your exam  If you have any questions, please call the Imaging Department where you will have your exam.            Sep 04, 2018  3:30 PM CDT   (Arrive by 3:15 PM)   Return Visit with Kadi Dee MD   [facility] ([facility])    [address] [place] [address]  [address]  [facility] [address] [phone]            Sep 11, 2018 12:30 PM CDT   Infusion 120 with UC ONCOLOGY INFUSION, UC 10 ATC   [facility] ([facility])    [address] [place] [address]  [address]  [facility] [address] [phone]              Who to contact     If you have questions or need follow up information about today's clinic visit or your schedule please contact [facility] directly at [phone].  Normal or non-critical lab and imaging results will be communicated to you by MyChart, letter or phone within 4 business days after the clinic has received the results. If you do not hear from us within 7 days, please contact the clinic through [email or web address] or phone. If you have a critical or abnormal lab result, we will notify you by phone as soon as possible.  Submit refill requests through [employer] or call your pharmacy and they will forward the refill request to us. Please allow 3 business days for your refill to be completed.          Additional Information About Your Visit        [email or web address] Information     [employer] gives you secure access to your electronic health record. If you see a primary care provider, you can also send messages to your care team and make appointments. If you have questions, please call your primary care clinic.  If you do not have a primary care provider, please call [phone] and they will assist you.        Care EveryWhere ID      This is your Care EveryWhere ID. This could be used by other organizations to access your [place] medical records  [id number]        Your Vitals Were     Pulse Temperature Respirations Pulse Oximetry BMI (Body Mass Index)       84 97.9  F (36.6  C) (Oral) 18 95% 42.81 kg/m2        Blood Pressure from Last 3 Encounters:   08/17/18 (!) 129/91   08/07/18 133/85   08/01/18 127/85    Weight from Last 3 Encounters:   08/17/18 (!) 168 kg (370 lb 6 oz)   08/07/18 (!) 168.6 kg (371 lb 9.6 oz)   08/01/18 (!) 165.6 kg (365 lb 1.6 oz)              We Performed the Following     CBC with platelets differential     Hepatic panel     Protein qualitative urine     Protein qualitative urine        Primary Care Provider Fax #    Physician No Ref-Primary [phone]       No address on file        Equal Access to Services     [facility]OS [facility] : Eliza Dunlap, waomer davis, nancy kaalmajuancarlos stoelo, geraldo washington . So [facility] [phone].    ATENCIÓN: Si habla español, tiene a alamo disposición servicios gratuitos de asistencia lingüística. Llame al [phone].    We comply with applicable federal civil rights laws and [place] laws. We do not discriminate on the basis of race, color, national origin, age, disability, sex, sexual orientation, or gender identity.            Thank you!     Thank you for choosing [facility]ER [facility]  for your care. Our goal is always to provide you with excellent care. Hearing back from our patients is one way we can continue to improve our services. Please take a few minutes to complete the written survey that you may receive in the mail after your visit with us. Thank you!             Your Updated Medication List - Protect others around you: Learn how to safely use, store and throw away your medicines at www.disposemymeds.org.          This list is accurate as of 8/17/18 10:04 AM.  Always use your most recent med list.                   Brand  Name Dispense Instructions for use Diagnosis    diphenoxylate-atropine 2.5-0.025 MG per tablet    LOMOTIL    40 tablet    Take 1 tablet by mouth 4 times daily as needed for diarrhea    Diarrhea, unspecified type       * enoxaparin 80 MG/0.8ML injection    LOVENOX    60 Syringe    Inject 1.3 mLs (130 mg) Subcutaneous 2 times daily    Cancer of distal third of esophagus (H)       * enoxaparin 150 MG/ML injection    LOVENOX     INJECT 0.86ML (130MG) TWICE A DAY FOR ACUTE PULMONARY EMBOLISM AND ESOPHAGEAL MALIGNANCY        fish oil-omega-3 fatty acids 1000 MG capsule      Take 2 g by mouth daily        furosemide 20 MG tablet    LASIX    30 tablet    Take 1 tablet (20 mg) by mouth daily    Cancer of distal third of esophagus (H), Generalized edema       gabapentin 300 MG capsule    NEURONTIN    180 capsule    Two tablets in the am and one tablet in the afternoon and evening    Chemotherapy-induced neuropathy (H)       latanoprost 0.005 % ophthalmic solution    XALATAN    1 Bottle    Place 1 drop into both eyes At Bedtime    Cancer of distal third of esophagus (H)       lidocaine-prilocaine cream    EMLA    30 g    Apply topically as needed for moderate pain    Cancer of distal third of esophagus (H)       * LORazepam 0.5 MG tablet    ATIVAN    30 tablet    Take 1 tablet (0.5 mg) by mouth every 4 hours as needed (Anxiety, Nausea/Vomiting or Sleep)    Cancer of distal third of esophagus (H)       * LORazepam 0.5 MG tablet    ATIVAN    30 tablet    Take 1 tablet (0.5 mg) by mouth every 4 hours as needed (Anxiety, Nausea/Vomiting or Sleep)    Cancer of distal third of esophagus (H)       Multi-vitamin Tabs tablet      Take 1 tablet by mouth daily        omeprazole 40 MG capsule    priLOSEC    90 capsule    Take 1 capsule (40 mg) by mouth daily    Cancer of distal third of esophagus (H)       ondansetron 8 MG tablet    ZOFRAN    30 tablet    Take 1 tablet (8 mg) by mouth every 8 hours as needed (Nausea/Vomiting)    Cancer of  distal third of esophagus (H)       * prochlorperazine 10 MG tablet    COMPAZINE    30 tablet    Take 1 tablet (10 mg) by mouth every 6 hours as needed (Nausea/Vomiting)    Cancer of distal third of esophagus (H)       * prochlorperazine 10 MG tablet    COMPAZINE    30 tablet    Take 1 tablet (10 mg) by mouth every 6 hours as needed (Nausea/Vomiting)    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 Read the directions carefully, and ask your doctor or other care provider to review them with you.

## 2018-08-21 ENCOUNTER — CARE COORDINATION (OUTPATIENT)
Dept: ONCOLOGY | Facility: CLINIC | Age: 58
End: 2018-08-21

## 2018-08-21 NOTE — PROGRESS NOTES
Returned call to patient. He states he feels his infusion schedule is not correct. He believes he should be getting chemo (Taxol/Cyrmaza) 2 weeks on, 1 week off. States he needs an infusion appt on 9/5 or to have CT and appt with Dr. Dee moved to 9/11. Reviewed Dr. Dee schedule and she currently does not have availability which would coincide with scheduled infusion appt on 9/11. Patient states he understand this and asks to have 9/5 infusion appointment added back onto schedule. Reviewed current treatment plan with patient. Treatment plan reflects patient is to have 1 week on, 1 week off. Patient states this is not correct. Patient will plan to discuss further with Katalina Ramirez PA-C. RNCC will request infusion appt for 9/5.

## 2018-08-27 ENCOUNTER — TRANSFERRED RECORDS (OUTPATIENT)
Dept: HEALTH INFORMATION MANAGEMENT | Facility: CLINIC | Age: 58
End: 2018-08-27

## 2018-08-27 DIAGNOSIS — G62.0 CHEMOTHERAPY-INDUCED NEUROPATHY (H): ICD-10-CM

## 2018-08-27 DIAGNOSIS — T45.1X5A CHEMOTHERAPY-INDUCED NEUROPATHY (H): ICD-10-CM

## 2018-08-27 LAB
BASOPHILS # BLD AUTO: 0.1 10*3/UL
BASOPHILS NFR BLD AUTO: 2 %
EOSINOPHIL # BLD AUTO: 0 10*3/UL
EOSINOPHIL NFR BLD AUTO: 0.4 %
ERYTHROCYTE [DISTWIDTH] IN BLOOD BY AUTOMATED COUNT: 15.3 %
HCT VFR BLD AUTO: 35.1 %
HEMOGLOBIN: 11.6 G/DL (ref 13.3–17.7)
LYMPHOCYTES # BLD AUTO: 0.8 10*3/UL
LYMPHOCYTES NFR BLD AUTO: 31.7 %
MCH RBC QN AUTO: 29.7 PG
MCHC RBC AUTO-ENTMCNC: 33 G/DL
MCV RBC AUTO: 90 FL
MONOCYTES # BLD AUTO: 0.2 10*3/UL
MONOCYTES NFR BLD AUTO: 9.3 %
NEUTROPHILS # BLD AUTO: 1.4 10*3/UL
NEUTROPHILS NFR BLD AUTO: 56.6 %
PLATELET # BLD AUTO: 135 10^9/L
PMV BLD: 10.8 FL
RBC # BLD AUTO: 3.9 10^12/L
WBC # BLD AUTO: 2.5 10^9/L

## 2018-08-27 RX ORDER — GABAPENTIN 300 MG/1
CAPSULE ORAL
Qty: 180 CAPSULE | Status: CANCELLED | OUTPATIENT
Start: 2018-08-27

## 2018-08-28 ENCOUNTER — ONCOLOGY VISIT (OUTPATIENT)
Dept: ONCOLOGY | Facility: CLINIC | Age: 58
End: 2018-08-28
Attending: PHYSICIAN ASSISTANT
Payer: COMMERCIAL

## 2018-08-28 ENCOUNTER — CARE COORDINATION (OUTPATIENT)
Dept: ONCOLOGY | Facility: CLINIC | Age: 58
End: 2018-08-28

## 2018-08-28 VITALS
OXYGEN SATURATION: 96 % | BODY MASS INDEX: 37.19 KG/M2 | TEMPERATURE: 97 F | HEIGHT: 77 IN | WEIGHT: 315 LBS | SYSTOLIC BLOOD PRESSURE: 145 MMHG | DIASTOLIC BLOOD PRESSURE: 88 MMHG | RESPIRATION RATE: 16 BRPM | HEART RATE: 66 BPM

## 2018-08-28 DIAGNOSIS — C15.5 CANCER OF DISTAL THIRD OF ESOPHAGUS (H): ICD-10-CM

## 2018-08-28 DIAGNOSIS — T45.1X5A CHEMOTHERAPY-INDUCED NEUROPATHY (H): ICD-10-CM

## 2018-08-28 DIAGNOSIS — C15.5 CANCER OF DISTAL THIRD OF ESOPHAGUS (H): Primary | ICD-10-CM

## 2018-08-28 DIAGNOSIS — G62.0 CHEMOTHERAPY-INDUCED NEUROPATHY (H): ICD-10-CM

## 2018-08-28 PROCEDURE — 96413 CHEMO IV INFUSION 1 HR: CPT

## 2018-08-28 PROCEDURE — 25000132 ZZH RX MED GY IP 250 OP 250 PS 637: Mod: ZF | Performed by: INTERNAL MEDICINE

## 2018-08-28 PROCEDURE — 96367 TX/PROPH/DG ADDL SEQ IV INF: CPT

## 2018-08-28 PROCEDURE — 25000125 ZZHC RX 250: Mod: ZF | Performed by: INTERNAL MEDICINE

## 2018-08-28 PROCEDURE — 96375 TX/PRO/DX INJ NEW DRUG ADDON: CPT

## 2018-08-28 PROCEDURE — G0463 HOSPITAL OUTPT CLINIC VISIT: HCPCS | Mod: ZF

## 2018-08-28 PROCEDURE — 99215 OFFICE O/P EST HI 40 MIN: CPT | Mod: ZP | Performed by: PHYSICIAN ASSISTANT

## 2018-08-28 PROCEDURE — 25000128 H RX IP 250 OP 636: Mod: ZF | Performed by: INTERNAL MEDICINE

## 2018-08-28 RX ORDER — OMEPRAZOLE 40 MG/1
40 CAPSULE, DELAYED RELEASE ORAL DAILY
Qty: 90 CAPSULE | Refills: 1 | Status: SHIPPED | OUTPATIENT
Start: 2018-08-28 | End: 2019-01-16

## 2018-08-28 RX ORDER — HEPARIN SODIUM (PORCINE) LOCK FLUSH IV SOLN 100 UNIT/ML 100 UNIT/ML
500 SOLUTION INTRAVENOUS EVERY 8 HOURS
Status: DISCONTINUED | OUTPATIENT
Start: 2018-08-28 | End: 2018-08-28 | Stop reason: HOSPADM

## 2018-08-28 RX ORDER — GABAPENTIN 300 MG/1
CAPSULE ORAL
Qty: 180 CAPSULE | Refills: 1 | Status: SHIPPED | OUTPATIENT
Start: 2018-08-28 | End: 2018-11-20

## 2018-08-28 RX ORDER — DIPHENHYDRAMINE HCL 25 MG
50 CAPSULE ORAL ONCE
Status: COMPLETED | OUTPATIENT
Start: 2018-08-28 | End: 2018-08-28

## 2018-08-28 RX ADMIN — FAMOTIDINE 20 MG: 20 INJECTION, SOLUTION INTRAVENOUS at 10:51

## 2018-08-28 RX ADMIN — SODIUM CHLORIDE, PRESERVATIVE FREE 500 UNITS: 5 INJECTION INTRAVENOUS at 12:40

## 2018-08-28 RX ADMIN — DIPHENHYDRAMINE HYDROCHLORIDE 50 MG: 25 CAPSULE ORAL at 10:46

## 2018-08-28 RX ADMIN — DEXAMETHASONE SODIUM PHOSPHATE 20 MG: 10 INJECTION, SOLUTION INTRAMUSCULAR; INTRAVENOUS at 11:09

## 2018-08-28 RX ADMIN — SODIUM CHLORIDE 250 ML: 9 INJECTION, SOLUTION INTRAVENOUS at 10:51

## 2018-08-28 RX ADMIN — PACLITAXEL 240 MG: 6 INJECTION, SOLUTION INTRAVENOUS at 11:35

## 2018-08-28 ASSESSMENT — PAIN SCALES - GENERAL: PAINLEVEL: SEVERE PAIN (7)

## 2018-08-28 NOTE — PROGRESS NOTES
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 His tumor is positive for cytokeratin 20, negative for P63 and CK7, and HER2 is negative. He started on FOLFOX (5FU/oxaliplatin) on 5/15/2017. He had a delay in treatment from 9/5-10/2/17 due to work related injury.    He had an excellent response by imaging throughout the summer 2017.    He a mixed response by imaging in late fall 2017.    In January 2018, he was switched to taxol and cyramza - had slight progression and neuropathy and clinical trial became available.       In March 2018, he was started on the [facility] Clinical Trial with crizotinib.  (MET amplification) He remained on crizotinib from March - July 2018. He was found to have progressive disease. He switched to Taxol/cyramza on 7/24/18. His treatment was changed to days 1,8 every 21 days with cycle 2 due to neutropenia.    INTERVAL HISTORY: Levi is here with his sister for cycle 2 day 8.  He feels that \"this isn't working\" because he has yet to have improvement of the dysphagia that was presented when he started this regimen. He is clear in that it hasn't gotten worse, but that its just not better. He reports no difficulty with liquids and certain foods, but if he were to eat a whole cheeseburger, this would get \"stuck\". When asked if it was improved with more thorough mastication, he wasn't sure. He has R shoulder/upper back pain that has been presented since the accident last year (torn rotator cuff). This flares from time to time, lasts 2-3 day usually. Started up again this morning. No radiating pains, SOB, or chest pain. No fevers, chills, mouth sores, nausea, vomiting, abdominal pain, bleeding, swelling, or worsening neuropathy.  His 10-point review of systems is otherwise negative.        PHYSICAL " "EXAMINATION  /88  Pulse 66  Temp 97  F (36.1  C) (Oral)  Resp 16  Ht 1.956 m (6' 5\")  Wt (!) 168.7 kg (372 lb)  SpO2 96%  BMI 44.11 kg/m2    Wt Readings from Last 4 Encounters:   08/17/18 (!) 168 kg (370 lb 6 oz)   08/07/18 (!) 168.6 kg (371 lb 9.6 oz)   08/01/18 (!) 165.6 kg (365 lb 1.6 oz)   07/24/18 (!) 163.3 kg (360 lb)     Constitutional: Alert, oriented male in no visible distress.  Eyes: PERRL. Anicteric sclerae.  ENT/Mouth: OM moist and pink without lesions or thrush.  CV: RRR  Resp: CTAB throughout  Abdomen: Soft, non-tender, non-distended. Obese. Bowel sounds present. Unable to palpate liver or spleen.   Extremities: 1+ pitting edema  b/l  Skin: Warm, dry.   Lymph: No cervical or supraclavicular lymphadenopathy appreciated.   Neuro: CN II-XII grossly intact.  Absent reflexed at knees bilaterally    ECOG PS: 1    LABS:  Results for KORI CHANEY (MRN [id number]) as of 8/28/2018 10:38   Ref. Range 8/27/2018 10:55   WBC Latest Units: 10^9/L 2.5   Hemoglobin Latest Ref Range: 13.3 - 17.7 g/dL 11.6 (A)   Hematocrit Latest Units: % 35.1   Platelet Count Latest Units: 10^9/L 135   MPV Unknown 10.8   RBC Count Latest Units: 10^12/L 3.90   MCV Latest Units: fl 90.0   MCH Latest Units: pg 29.7   MCHC Latest Units: g/dL 33.0   RDW Latest Units: % 15.3   % Neutrophils Latest Units: % 56.6   % Lymphocytes Latest Units: % 31.7   % Monocytes Latest Units: % 9.3   % Eosinophils Latest Units: % 0.4   % Basophils Latest Units: % 2.0   Absolute Neutrophil Unknown 1.4   Absolute Lymphocytes Unknown 0.8   Absolute Monocytes Unknown 0.2   Absolute Eosinophils Unknown 0.0   Absolute Basophils Unknown 0.1     IMAGING:  CT CAP 7/17/18  IMPRESSION: In this patient with esophageal cancer, there is evidence  of progression of disease:  1a. Increased size of the ill-defined mass which abuts the esophageal  junction and diaphragmatic andrey extending into the retroperitoneum and  central mesentery.  1b. Increased size of " a presumably paraesophageal lymph node.  1c. Worsening retroperitoneal lymphadenopathy including two dominant  peripancreatic/periceliac nodes. Additional unchanged enlarged  mediastinal adenopathy.  3. Unchanged compression deformities of T9, T12, and L2.    IMPRESSION/PLAN:  1. Metastatic esophogeal adenocarcinoma. He has had treatment with FOLFOX and then transitioned to Taxol with ramicurimab.  He was then on crizotinib on the [employer] Match study.  He progressed on this and returned on taxol/cyramza. His treatment was changed to days 1, 8 due to neutropenia. He is here today for cycle 2 day 8. He is tolerating well overall with minimal toxicities. The dysphagia he had at start of treatment is stable. We will continue today. He continues to receive Neupogen for 3 days following chemotherapy. He will have a CT after this cycle and will see Dr. Dee that day.    His [facility] testing did return with low positive PDL 1 status, indicating he could be a candidate for Keytruda.  Dr. Dee also looking into cabozantinib insurance coverage.    2. Edema -  stable     3. He has a history of pulmonary embolism.  He is on Lovenox twice daily.  He has progressed through Xarelto in the past.       4. He does have baseline neuropathy.  Gabapentin is maintained at 600/300/600 mg daily.    5. R shoulder pain, chronic s/p injury. OK to use Tylenol, he reports having an array of narcotics at home, discussed safe practices and asked he use only one type. Call if pain not improving, usually resolves in 2-3 days. Anticipate the dex pre-med with chemo will help as this is likely inflammatory.    6. BP. Trending up on Cyramza. Most recent values have been stable. Discussed starting anti-hypertensive, Levi was apprehensive about this. After our discussion, agreed to continue monitoring. If scans are stable and he is to continue on Cyramza, and BP remains >140/90, will need to start medication.     Katalina Ramirez PA-C     I spent >40  minutes with the patient, with over 50% of the time spent counseling or coordinating their care as described above.

## 2018-08-28 NOTE — PROGRESS NOTES
Infusion Nursing Note:  Reuben Padilla presents today for Cycle 2 Day 8 Taxol.    Patient seen by provider today: Yes: Katalina Ramirez PA-C   present during visit today: Not Applicable.    Note: Reuben is hre following his appointment with Katalina in clinic. He offers no new concerns at this time.    TORB 8/28/2018 @ 1020 Katalina Ramirez PA-C/Audi Galindo, RN:   - Okay to proceed with ANC of 1.4.  - Provider aware of BP and discussed course of action with patient.     Intravenous Access:  Implanted Port accessed at patient's local hospital for labs yesterday.    Treatment Conditions:  Lab Results   Component Value Date    HGB 11.6 08/27/2018     Lab Results   Component Value Date    WBC 2.5 08/27/2018      Lab Results   Component Value Date    ANEU 1.4 08/27/2018     Lab Results   Component Value Date     08/27/2018      Lab Results   Component Value Date     07/24/2018                   Lab Results   Component Value Date    POTASSIUM 4.2 07/24/2018           Lab Results   Component Value Date    MAG 1.9 07/17/2018            Lab Results   Component Value Date    CR 0.91 07/24/2018                   Lab Results   Component Value Date    NIDHI 8.4 07/24/2018                Lab Results   Component Value Date    BILITOTAL 0.4 08/17/2018           Lab Results   Component Value Date    ALBUMIN 3.1 08/17/2018                    Lab Results   Component Value Date    ALT 20 08/17/2018           Lab Results   Component Value Date    AST 19 08/17/2018       Results reviewed, labs did NOT meet treatment parameters: Per Katalina Ramirez, okay to proceed (see above TORB).    Post Infusion Assessment:  Patient tolerated infusion without incident.  Blood return noted pre and post infusion.  Site patent and intact, free from redness, edema or discomfort.  No evidence of extravasations.  Access discontinued per protocol.    Discharge Plan:   Patient declined prescription refills.  Discharge instructions reviewed  with: Patient.  Patient and/or family verbalized understanding of discharge instructions and all questions answered.  AVS to patient via [employer].  Patient will return 9/11/2018 for next appointment.   Patient discharged in stable condition accompanied by: self.  Departure Mode: Ambulatory.    Patient will get 3 days of Neupogen following treatment. Orders sent to local [facility] in [place] by [facility]. Patient to make appointment.       Audi Galindo RN

## 2018-08-28 NOTE — MR AVS SNAPSHOT
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)           Follow-ups after your visit        Your next 10 appointments already scheduled     Sep 04, 2018  1:15 PM CDT   [facility] with  [facility] DRAW   [facility] ([facility])    [address]  [address]  [facility] [id number] [phone]            Sep 04, 2018  1:40 PM CDT   CT CHEST/ABDOMEN/PELVIS W CONTRAST with UCCT2   [facility] CT ([facility])    [address]  1st Floor  [facility] [id number] [phone]           Please bring any scans or X-rays taken at other hospitals, if similar tests were done. Also bring a list of your medicines, including vitamins, minerals and over-the-counter drugs. It is safest to leave personal items at home.  Be sure to tell your doctor:   If you have any allergies.   If there s any chance you are pregnant.   If you are breastfeeding.  How to prepare:   Do not eat or drink for 2 hours before your exam. If you need to take medicine, you may take it with small sips of water. (We may ask you to take liquid medicine as well.)   Please wear loose clothing, such as a sweat suit or jogging clothes. Avoid snaps, zippers and other metal. We may ask you to undress and put on a hospital gown.  Please arrive 30 minutes early for your CT. Once in the department you might be asked to drink water 15-20 minutes prior to your exam.  If indicated you may be asked to drink an oral contrast in advance of your CT.  If this is the case, the imaging team will let you know or be in contact  with you prior to your appointment  Patients over 70 or patients with diabetes or kidney problems:   If you haven t had a blood test (creatinine test) within the last 30 days, the Cardiologist/Radiologist may require you to get this test prior to your exam.  If you have diabetes:   Continue to take your metformin medication on the day of your exam  If you have any questions, please call the Imaging Department where you will have your exam.            Sep 04, 2018  3:30 PM CDT   (Arrive by 3:15 PM)   Return Visit with Kadi Dee MD   [facility] ([facility])    [address] [place] [address]  [address]  [facility] [address] [phone]            Sep 11, 2018 12:30 PM CDT   Infusion 120 with UC ONCOLOGY INFUSION, UC 10 ATC   [facility] ([facility])    [address] [place] [address]  [address]  [facility] [address] [phone]              Who to contact     If you have questions or need follow up information about today's clinic visit or your schedule please contact [facility] directly at [phone].  Normal or non-critical lab and imaging results will be communicated to you by MyChart, letter or phone within 4 business days after the clinic has received the results. If you do not hear from us within 7 days, please contact the clinic through MyChart or phone. If you have a critical or abnormal lab result, we will notify you by phone as soon as possible.  Submit refill requests through [employer] or call your pharmacy and they will forward the refill request to us. Please allow 3 business days for your refill to be completed.          Additional Information About Your Visit        MyChart Information     [employer] gives you secure access to your electronic health record. If you see a primary care provider, you can also send messages to your care team and make appointments. If you have questions, please call  "your primary care clinic.  If you do not have a primary care provider, please call [phone] and they will assist you.        Care EveryWhere ID     This is your Care EveryWhere ID. This could be used by other organizations to access your [facility] medical records  [id number]        Your Vitals Were     Pulse Temperature Respirations Height Pulse Oximetry BMI (Body Mass Index)    66 97  F (36.1  C) (Oral) 16 1.956 m (6' 5\") 96% 44.11 kg/m2       Blood Pressure from Last 3 Encounters:   08/28/18 145/88   08/17/18 (!) 129/91   08/07/18 133/85    Weight from Last 3 Encounters:   08/28/18 (!) 168.7 kg (372 lb)   08/17/18 (!) 168 kg (370 lb 6 oz)   08/07/18 (!) 168.6 kg (371 lb 9.6 oz)              Today, you had the following     No orders found for display         Today's Medication Changes          These changes are accurate as of 8/28/18 10:43 AM.  If you have any questions, ask your nurse or doctor.               These medicines have changed or have updated prescriptions.        Dose/Directions    prochlorperazine 10 MG tablet   Commonly known as:  COMPAZINE   This may have changed:  Another medication with the same name was removed. Continue taking this medication, and follow the directions you see here.   Used for:  Cancer of distal third of esophagus (H)   Changed by:  Katalina Ramirez PA-C        Dose:  10 mg   Take 1 tablet (10 mg) by mouth every 6 hours as needed (Nausea/Vomiting)   Quantity:  30 tablet   Refills:  2            Where to get your medicines      These medications were sent to [id number] - [facility] [address]  [address] [address]     Phone:  [phone]     gabapentin 300 MG capsule    omeprazole 40 MG capsule                Primary Care Provider Fax #    Physician No Ref-Primary [phone]       No address on file        Equal Access to Services     OLLIE SHARPE AH: Eliza Dunlap, yeyo davis, nancy sotelo, " geraldo dhillonlauryn marroquin'aan ah. So [facility] [phone].    ATENCIÓN: Si angelikala jennifer, tiene a alamo disposición servicios gratuitos de asistencia lingüística. Tanisha anderson [phone].    We comply with applicable federal civil rights laws and [place] laws. We do not discriminate on the basis of race, color, national origin, age, disability, sex, sexual orientation, or gender identity.            Thank you!     Thank you for choosing [facility]  for your care. Our goal is always to provide you with excellent care. Hearing back from our patients is one way we can continue to improve our services. Please take a few minutes to complete the written survey that you may receive in the mail after your visit with us. Thank you!             Your Updated Medication List - Protect others around you: Learn how to safely use, store and throw away your medicines at www.disposemymeds.org.          This list is accurate as of 8/28/18 10:43 AM.  Always use your most recent med list.                   Brand Name Dispense Instructions for use Diagnosis    diphenoxylate-atropine 2.5-0.025 MG per tablet    LOMOTIL    40 tablet    Take 1 tablet by mouth 4 times daily as needed for diarrhea    Diarrhea, unspecified type       * enoxaparin 80 MG/0.8ML injection    LOVENOX    60 Syringe    Inject 1.3 mLs (130 mg) Subcutaneous 2 times daily    Cancer of distal third of esophagus (H)       * enoxaparin 150 MG/ML injection    LOVENOX     INJECT 0.86ML (130MG) TWICE A DAY FOR ACUTE PULMONARY EMBOLISM AND ESOPHAGEAL MALIGNANCY        fish oil-omega-3 fatty acids 1000 MG capsule      Take 2 g by mouth daily        furosemide 20 MG tablet    LASIX    30 tablet    Take 1 tablet (20 mg) by mouth daily    Cancer of distal third of esophagus (H), Generalized edema       gabapentin 300 MG capsule    NEURONTIN    180 capsule    Two tablets in the am and one tablet in the afternoon and evening    Chemotherapy-induced neuropathy  (H)       latanoprost 0.005 % ophthalmic solution    XALATAN    1 Bottle    Place 1 drop into both eyes At Bedtime    Cancer of distal third of esophagus (H)       lidocaine-prilocaine cream    EMLA    30 g    Apply topically as needed for moderate pain    Cancer of distal third of esophagus (H)       * LORazepam 0.5 MG tablet    ATIVAN    30 tablet    Take 1 tablet (0.5 mg) by mouth every 4 hours as needed (Anxiety, Nausea/Vomiting or Sleep)    Cancer of distal third of esophagus (H)       * LORazepam 0.5 MG tablet    ATIVAN    30 tablet    Take 1 tablet (0.5 mg) by mouth every 4 hours as needed (Anxiety, Nausea/Vomiting or Sleep)    Cancer of distal third of esophagus (H)       Multi-vitamin Tabs tablet      Take 1 tablet by mouth daily        omeprazole 40 MG capsule    priLOSEC    90 capsule    Take 1 capsule (40 mg) by mouth daily    Cancer of distal third of esophagus (H)       ondansetron 8 MG tablet    ZOFRAN    30 tablet    Take 1 tablet (8 mg) by mouth every 8 hours as needed (Nausea/Vomiting)    Cancer of distal third of esophagus (H)       prochlorperazine 10 MG tablet    COMPAZINE    30 tablet    Take 1 tablet (10 mg) by mouth every 6 hours as needed (Nausea/Vomiting)    Cancer of distal third of esophagus (H)       tadalafil 5 MG tablet    CIALIS    20 tablet    Take 2 tablets (10 mg) by mouth every 24 hours As needed for sexual activity    Malignant neoplasm of lower third of esophagus (H), Drug-induced erectile dysfunction       timolol 0.5 % ophthalmic solution    TIMOPTIC     Place into both eyes daily    Cancer of distal third of esophagus (H)       zolpidem 10 MG tablet    AMBIEN    60 tablet    Take 1 tablet (10 mg) by mouth nightly as needed    Metastasis from gastric cancer (H)       * Notice:  This list has 4 medication(s) that are the same as other medications prescribed for you. Read the directions carefully, and ask your doctor or other care provider to review them with you.

## 2018-08-28 NOTE — PATIENT INSTRUCTIONS
[facility] Main Line: [phone]    Call triage nurse with chills and/or temperature greater than or equal to 100.4, uncontrolled nausea/vomiting, diarrhea, constipation, dizziness, shortness of breath, chest pain, bleeding, unexplained bruising, or any new/concerning symptoms, questions/concerns.     If you are having any concerning symptoms or wish to speak to a provider before your next infusion visit, please call your care coordinator or triage to notify them so we can adequately serve you.     For triage nurse, after hours, weekends, and holidays: [phone]          August 2018 Sunday Monday Tuesday Wednesday Thursday Friday Saturday                  1     [facility]    9:45 AM   (15 min.)   [facility]   [facility] Draw     [facility] RETURN   10:15 AM   (50 min.)   Amparo Sow PA   [facility]   11:30 AM   (120 min.)   [facility] ONCOLOGY INFUSION   [facility] 2     3     4       5     6     7     [facility] DRAW   10:15 AM   (15 min.)   [facility]   [facility] Draw     [facility] RETURN   10:45 AM   (30 min.)   Kadi Dee MD   [facility]     [facility] ONC INFUSION 120   11:30 AM   (120 min.)   [facility] ONCOLOGY INFUSION   [facility] 8     9     10     11       12     13     14     15     16     17     [facility] ONC INFUSION 120    7:00 AM   (120 min.)   [facility] ONCOLOGY INFUSION   [facility] 18       19     20     21     22     23     24     25       26     27     28     [facility] RETURN    9:05 AM   (50 min.)   Katalina Ramirez PA-C   [facility]     [facility] ONC INFUSION 120   10:00 AM   (120 min.)   [facility] ONCOLOGY INFUSION   [facility] 29     30     31  Happy Birthday!                     September 2018 Sunday Monday Tuesday Wednesday Thursday Friday Saturday                                 1       2      3     4     [facility] D[facility]    1:15 PM   (15 min.)   [facility] DRAW   [facility] Lab Draw     CT CHEST/ABDOMEN/PELVIS W    1:40 PM   (20 min.)   UCCT2   [facility] CT     [facility] RETURN    3:15 PM   (30 min.)   Kadi Dee MD   [facility] 5     6     7     8       9     10     11     [facility] ONC INFUSION 120   12:30 PM   (120 min.)    ONCOLOGY INFUSION   [facility] 12     13     14     15       16     17     18     [facility] ONC INFUSION 120   10:30 AM   (120 min.)    ONCOLOGY INFUSION   [facility] 19     20     21     22       23     24     25     26     27     28     29       30                                                Lab Results:  No results found for this or any previous visit (from the past 12 hour(s)).

## 2018-08-28 NOTE — NURSING NOTE
"Oncology Rooming Note    August 28, 2018 9:36 AM   Reuben Padilla is a 57 year old male who presents for:    Chief Complaint   Patient presents with     Oncology Clinic Visit     Esophageal Cancer     Initial Vitals: /88  Pulse 66  Temp 97  F (36.1  C) (Oral)  Resp 16  Ht 1.956 m (6' 5\")  Wt (!) 168.7 kg (372 lb)  SpO2 96%  BMI 44.11 kg/m2 Estimated body mass index is 44.11 kg/(m^2) as calculated from the following:    Height as of this encounter: 1.956 m (6' 5\").    Weight as of this encounter: 168.7 kg (372 lb). Body surface area is 3.03 meters squared.  Severe Pain (7) Comment: Back Pain   No LMP for male patient.  Allergies reviewed: Yes  Medications reviewed: Yes    Medications: Medication refills not needed today.  Pharmacy name entered into [employer]:    [id number] - [place], [place] - [address] [place] [address] AT [address] & [facility] [place], [place] - [phone]  [id number] - [place], [place] - [address]    Clinical concerns: No New Concerns    5 minutes for nursing intake (face to face time)     SUKUMAR Funez      "

## 2018-08-28 NOTE — LETTER
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 His tumor is positive for cytokeratin 20, negative for P63 and CK7, and HER2 is negative. He started on FOLFOX (5FU/oxaliplatin) on 5/15/2017. He had a delay in treatment from 9/5-10/2/17 due to work related injury.    He had an excellent response by imaging throughout the summer 2017.    He a mixed response by imaging in late fall 2017.    In January 2018, he was switched to taxol and cyramza - had slight progression and neuropathy and clinical trial became available.       In March 2018, he was started on the [facility] Match Clinical Trial with crizotinib.  (MET amplification) He remained on crizotinib from March - July 2018. He was found to have progressive disease. He switched to Taxol/cyramza on 7/24/18. His treatment was changed to days 1,8 every 21 days with cycle 2 due to neutropenia.    INTERVAL HISTORY: Levi is here with his sister for cycle 2 day 8.  He feels that \"this isn't working\" because he has yet to have improvement of the dysphagia that was presented when he started this regimen. He is clear in that it hasn't gotten worse, but that its just not better. He reports no difficulty with liquids and certain foods, but if he were to eat a whole cheeseburger, this would get \"stuck\". When asked if it was improved with more thorough mastication, he wasn't sure. He has R shoulder/upper back pain that has been presented since the accident last year (torn rotator cuff). This flares from time to time, lasts 2-3 day usually. Started up again this morning. No radiating pains, SOB, or chest pain. No fevers, chills, mouth sores, nausea, vomiting, abdominal pain, bleeding, swelling, or worsening " "neuropathy.  His 10-point review of systems is otherwise negative.        PHYSICAL EXAMINATION  /88  Pulse 66  Temp 97  F (36.1  C) (Oral)  Resp 16  Ht 1.956 m (6' 5\")  Wt (!) 168.7 kg (372 lb)  SpO2 96%  BMI 44.11 kg/m2    Wt Readings from Last 4 Encounters:   08/17/18 (!) 168 kg (370 lb 6 oz)   08/07/18 (!) 168.6 kg (371 lb 9.6 oz)   08/01/18 (!) 165.6 kg (365 lb 1.6 oz)   07/24/18 (!) 163.3 kg (360 lb)     Constitutional: Alert, oriented male in no visible distress.  Eyes: PERRL. Anicteric sclerae.  ENT/Mouth: OM moist and pink without lesions or thrush.  CV: RRR  Resp: CTAB throughout  Abdomen: Soft, non-tender, non-distended. Obese. Bowel sounds present. Unable to palpate liver or spleen.   Extremities: 1+ pitting edema  b/l  Skin: Warm, dry.   Lymph: No cervical or supraclavicular lymphadenopathy appreciated.   Neuro: CN II-XII grossly intact.  Absent reflexed at knees bilaterally    ECOG PS: 1    LABS:  Results for KORI CHANEY (MRN [id number]) as of 8/28/2018 10:38   Ref. Range 8/27/2018 10:55   WBC Latest Units: 10^9/L 2.5   Hemoglobin Latest Ref Range: 13.3 - 17.7 g/dL 11.6 (A)   Hematocrit Latest Units: % 35.1   Platelet Count Latest Units: 10^9/L 135   MPV Unknown 10.8   RBC Count Latest Units: 10^12/L 3.90   MCV Latest Units: fl 90.0   MCH Latest Units: pg 29.7   MCHC Latest Units: g/dL 33.0   RDW Latest Units: % 15.3   % Neutrophils Latest Units: % 56.6   % Lymphocytes Latest Units: % 31.7   % Monocytes Latest Units: % 9.3   % Eosinophils Latest Units: % 0.4   % Basophils Latest Units: % 2.0   Absolute Neutrophil Unknown 1.4   Absolute Lymphocytes Unknown 0.8   Absolute Monocytes Unknown 0.2   Absolute Eosinophils Unknown 0.0   Absolute Basophils Unknown 0.1     IMAGING:  CT CAP 7/17/18  IMPRESSION: In this patient with esophageal cancer, there is evidence  of progression of disease:  1a. Increased size of the ill-defined mass which abuts the esophageal  junction and diaphragmatic " andrey extending into the retroperitoneum and  central mesentery.  1b. Increased size of a presumably paraesophageal lymph node.  1c. Worsening retroperitoneal lymphadenopathy including two dominant  peripancreatic/periceliac nodes. Additional unchanged enlarged  mediastinal adenopathy.  3. Unchanged compression deformities of T9, T12, and L2.    IMPRESSION/PLAN:  1. Metastatic esophogeal adenocarcinoma. He has had treatment with FOLFOX and then transitioned to Taxol with ramicurimab.  He was then on crizotinib on the [facility] Match study.  He progressed on this and returned on taxol/cyramza. His treatment was changed to days 1, 8 due to neutropenia. He is here today for cycle 2 day 8. He is tolerating well overall with minimal toxicities. The dysphagia he had at start of treatment is stable. We will continue today. He continues to receive Neupogen for 3 days following chemotherapy. He will have a CT after this cycle and will see Dr. Dee that day.    His [facility] One testing did return with low positive PDL 1 status, indicating he could be a candidate for Keytruda.  Dr. Dee also looking into cabozantinib insurance coverage.    2. Edema -  stable     3. He has a history of pulmonary embolism.  He is on Lovenox twice daily.  He has progressed through Xarelto in the past.       4. He does have baseline neuropathy.  Gabapentin is maintained at 600/300/600 mg daily.    5. R shoulder pain, chronic s/p injury. OK to use Tylenol, he reports having an array of narcotics at home, discussed safe practices and asked he use only one type. Call if pain not improving, usually resolves in 2-3 days. Anticipate the dex pre-med with chemo will help as this is likely inflammatory.    6. BP. Trending up on Cyramza. Most recent values have been stable. Discussed starting anti-hypertensive, Levi was apprehensive about this. After our discussion, agreed to continue monitoring. If scans are stable and he is to continue on Cyramza, and BP  remains >140/90, will need to start medication.     Katalina Ramirez PA-C     I spent >40 minutes with the patient, with over 50% of the time spent counseling or coordinating their care as described above.      JAI AlmarazC

## 2018-08-28 NOTE — PROGRESS NOTES
Orders for neupogen (8/29, 8/30, 8/31) and CBC (9/10/18)  faxed to [facility]. OK result received via Rightx

## 2018-08-28 NOTE — MR AVS SNAPSHOT
After Visit Summary   8/28/2018    Reuben Padilla    MRN: [id number]           Patient Information     Date Of Birth          1960        Visit Information        Provider Department      8/28/2018 10:00 AM UC 23 ATC;  ONCOLOGY INFUSION [facility]        Today's Diagnoses     Cancer of distal third of esophagus (H)    -  1      [facility] Main Line: [phone]    Call triage nurse with chills and/or temperature greater than or equal to 100.4, uncontrolled nausea/vomiting, diarrhea, constipation, dizziness, shortness of breath, chest pain, bleeding, unexplained bruising, or any new/concerning symptoms, questions/concerns.     If you are having any concerning symptoms or wish to speak to a provider before your next infusion visit, please call your care coordinator or triage to notify them so we can adequately serve you.     For triage nurse, after hours, weekends, and holidays: [phone]          August 2018 Sunday Monday Tuesday Wednesday Thursday Friday Saturday                  1     P [facility] LAB DRAW    9:45 AM   (15 min.)    [facility] DRAW   [facility] Lab Draw     [facility] RETURN   10:15 AM   (50 min.)   Amparo Sow PA   [facility] ONC INFUSION 120   11:30 AM   (120 min.)    ONCOLOGY INFUSION   [facility] 2     3     4       5     6     7     [facility] DRAW   10:15 AM   (15 min.)    [facility] DRAW   [facility] Lab Draw     [facility] RETURN   10:45 AM   (30 min.)   [facility] Dee MD   [facility]   11:30 AM   (120 min.)    ONCOLOGY INFUSION   [facility] 8     9     10     11       12     13     14     15     16     17     [facility] ONC INFUSION 120    7:00 AM   (120 min.)    ONCOLOGY INFUSION   [facility] 18       19     20     21     22     23     24     25       26      27     28     [facility] RETURN    9:05 AM   (50 min.)   Katalina Ramirez PA-C   [facility]     [facility] ONC INFUSION 120   10:00 AM   (120 min.)    ONCOLOGY INFUSION   [facility] 29     30     31  Happy Birthday!                     September 2018 Sunday Monday Tuesday Wednesday Thursday Friday Saturday                                 1       2     3     4     [facility] DRAW    1:15 PM   (15 min.)    [facility]     CT CHEST/ABDOMEN/PELVIS W    1:40 PM   (20 min.)   UCCT2   [facility] CT     UMP RETURN    3:15 PM   (30 min.)   Kadi Dee MD   [facility] 5     6     7     8       9     10     11     [facility] ONC INFUSION 120   12:30 PM   (120 min.)    ONCOLOGY INFUSION   [facility] 12     13     14     15       16     17     18     [facility] ONC INFUSION 120   10:30 AM   (120 min.)    ONCOLOGY INFUSION   [facility] 19     20     21     22       23     24     25     26     27     28     29       30                                                Lab Results:  No results found for this or any previous visit (from the past 12 hour(s)).                Follow-ups after your visit        Your next 10 appointments already scheduled     Sep 04, 2018  1:15 PM CDT   [facility] Draw with  [facility] DRAW   [facility] ([facility])    [address] [id number] [phone]            Sep 04, 2018  1:40 PM CDT   CT CHEST/ABDOMEN/PELVIS W CONTRAST with CT2   [facility] CT ([facility])    [address] Floor  [facility] [id number] [phone]           Please bring any scans or X-rays taken at other hospitals, if similar tests were done. Also bring a list of your medicines, including vitamins, minerals and over-the-counter drugs. It is safest  to leave personal items at home.  Be sure to tell your doctor:   If you have any allergies.   If there s any chance you are pregnant.   If you are breastfeeding.  How to prepare:   Do not eat or drink for 2 hours before your exam. If you need to take medicine, you may take it with small sips of water. (We may ask you to take liquid medicine as well.)   Please wear loose clothing, such as a sweat suit or jogging clothes. Avoid snaps, zippers and other metal. We may ask you to undress and put on a hospital gown.  Please arrive 30 minutes early for your CT. Once in the department you might be asked to drink water 15-20 minutes prior to your exam.  If indicated you may be asked to drink an oral contrast in advance of your CT.  If this is the case, the imaging team will let you know or be in contact with you prior to your appointment  Patients over 70 or patients with diabetes or kidney problems:   If you haven t had a blood test (creatinine test) within the last 30 days, the Cardiologist/Radiologist may require you to get this test prior to your exam.  If you have diabetes:   Continue to take your metformin medication on the day of your exam  If you have any questions, please call the Imaging Department where you will have your exam.            Sep 04, 2018  3:30 PM CDT   (Arrive by 3:15 PM)   Return Visit with Kadi Dee MD   [facility])    [address] [place] [address]  [address] [place] [id number]   [phone]            Sep 11, 2018 12:30 PM CDT   Infusion 120 with UC ONCOLOGY INFUSION, UC 10 ATC   [facility]    [address] [place] [address]  [address] [place] [address]   [phone]            Sep 18, 2018 10:30 AM CDT   Infusion 120 with UC ONCOLOGY INFUSION, UC 27 ATC   [facility])    [address] [place] [address]  [address] [place] [address]  [id number] [phone]              Who to contact     If you have questions or need follow up information about today's clinic visit or your schedule please contact [facility] directly at [phone].  Normal or non-critical lab and imaging results will be communicated to you by Poke'n Callhart, letter or phone within 4 business days after the clinic has received the results. If you do not hear from us within 7 days, please contact the clinic through Alverixt or phone. If you have a critical or abnormal lab result, we will notify you by phone as soon as possible.  Submit refill requests through Poshmark or call your pharmacy and they will forward the refill request to us. Please allow 3 business days for your refill to be completed.          Additional Information About Your Visit        Poshmark Information     Poshmark gives you secure access to your electronic health record. If you see a primary care provider, you can also send messages to your care team and make appointments. If you have questions, please call your primary care clinic.  If you do not have a primary care provider, please call [phone] and they will assist you.        Care EveryWhere ID     This is your Care EveryWhere ID. This could be used by other organizations to access your [place] medical records  [id number]         Blood Pressure from Last 3 Encounters:   08/28/18 145/88   08/17/18 (!) 129/91   08/07/18 133/85    Weight from Last 3 Encounters:   08/28/18 (!) 168.7 kg (372 lb)   08/17/18 (!) 168 kg (370 lb 6 oz)   08/07/18 (!) 168.6 kg (371 lb 9.6 oz)              Today, you had the following     No orders found for display         Today's Medication Changes          These changes are accurate as of 8/28/18 12:56 PM.  If you have any questions, ask your nurse or doctor.               These medicines have changed or have updated prescriptions.        Dose/Directions    prochlorperazine 10 MG tablet   Commonly known as:   COMPAZINE   This may have changed:  Another medication with the same name was removed. Continue taking this medication, and follow the directions you see here.   Used for:  Cancer of distal third of esophagus (H)   Changed by:  Katalina Ramirez PA-C        Dose:  10 mg   Take 1 tablet (10 mg) by mouth every 6 hours as needed (Nausea/Vomiting)   Quantity:  30 tablet   Refills:  2            Where to get your medicines      These medications were sent to [id number] - [place], [place] - [address]  [address], [facility] [address]     Phone:  [phone]     gabapentin 300 MG capsule    omeprazole 40 MG capsule                Primary Care Provider Fax #    Physician No Ref-Primary [phone]       No address on file        Equal Access to Services     OLLIE SHARPE : Eliza Dunlap, waomer davis, qaybta kaalmajuancarlos sotelo, geraldo washington . So [facility] [phone].    ATENCIÓN: Si habla español, tiene a alamo disposición servicios gratuitos de asistencia lingüística. [facility] [phone].    We comply with applicable federal civil rights laws and [place] laws. We do not discriminate on the basis of race, color, national origin, age, disability, sex, sexual orientation, or gender identity.            Thank you!     Thank you for choosing [facility]  for your care. Our goal is always to provide you with excellent care. Hearing back from our patients is one way we can continue to improve our services. Please take a few minutes to complete the written survey that you may receive in the mail after your visit with us. Thank you!             Your Updated Medication List - Protect others around you: Learn how to safely use, store and throw away your medicines at www.disposemymeds.org.          This list is accurate as of 8/28/18 12:56 PM.  Always use your most recent med list.                   Brand Name Dispense Instructions for use  Diagnosis    diphenoxylate-atropine 2.5-0.025 MG per tablet    LOMOTIL    40 tablet    Take 1 tablet by mouth 4 times daily as needed for diarrhea    Diarrhea, unspecified type       * enoxaparin 80 MG/0.8ML injection    LOVENOX    60 Syringe    Inject 1.3 mLs (130 mg) Subcutaneous 2 times daily    Cancer of distal third of esophagus (H)       * enoxaparin 150 MG/ML injection    LOVENOX     INJECT 0.86ML (130MG) TWICE A DAY FOR ACUTE PULMONARY EMBOLISM AND ESOPHAGEAL MALIGNANCY        fish oil-omega-3 fatty acids 1000 MG capsule      Take 2 g by mouth daily        furosemide 20 MG tablet    LASIX    30 tablet    Take 1 tablet (20 mg) by mouth daily    Cancer of distal third of esophagus (H), Generalized edema       gabapentin 300 MG capsule    NEURONTIN    180 capsule    Two tablets in the am and one tablet in the afternoon and evening    Chemotherapy-induced neuropathy (H)       latanoprost 0.005 % ophthalmic solution    XALATAN    1 Bottle    Place 1 drop into both eyes At Bedtime    Cancer of distal third of esophagus (H)       lidocaine-prilocaine cream    EMLA    30 g    Apply topically as needed for moderate pain    Cancer of distal third of esophagus (H)       * LORazepam 0.5 MG tablet    ATIVAN    30 tablet    Take 1 tablet (0.5 mg) by mouth every 4 hours as needed (Anxiety, Nausea/Vomiting or Sleep)    Cancer of distal third of esophagus (H)       * LORazepam 0.5 MG tablet    ATIVAN    30 tablet    Take 1 tablet (0.5 mg) by mouth every 4 hours as needed (Anxiety, Nausea/Vomiting or Sleep)    Cancer of distal third of esophagus (H)       Multi-vitamin Tabs tablet      Take 1 tablet by mouth daily        omeprazole 40 MG capsule    priLOSEC    90 capsule    Take 1 capsule (40 mg) by mouth daily    Cancer of distal third of esophagus (H)       ondansetron 8 MG tablet    ZOFRAN    30 tablet    Take 1 tablet (8 mg) by mouth every 8 hours as needed (Nausea/Vomiting)    Cancer of distal third of esophagus (H)        prochlorperazine 10 MG tablet    COMPAZINE    30 tablet    Take 1 tablet (10 mg) by mouth every 6 hours as needed (Nausea/Vomiting)    Cancer of distal third of esophagus (H)       tadalafil 5 MG tablet    CIALIS    20 tablet    Take 2 tablets (10 mg) by mouth every 24 hours As needed for sexual activity    Malignant neoplasm of lower third of esophagus (H), Drug-induced erectile dysfunction       timolol 0.5 % ophthalmic solution    TIMOPTIC     Place into both eyes daily    Cancer of distal third of esophagus (H)       zolpidem 10 MG tablet    AMBIEN    60 tablet    Take 1 tablet (10 mg) by mouth nightly as needed    Metastasis from gastric cancer (H)       * Notice:  This list has 4 medication(s) that are the same as other medications prescribed for you. Read the directions carefully, and ask your doctor or other care provider to review them with you.

## 2018-09-04 ENCOUNTER — ONCOLOGY VISIT (OUTPATIENT)
Dept: ONCOLOGY | Facility: CLINIC | Age: 58
End: 2018-09-04
Attending: INTERNAL MEDICINE
Payer: COMMERCIAL

## 2018-09-04 ENCOUNTER — RADIANT APPOINTMENT (OUTPATIENT)
Dept: CT IMAGING | Facility: CLINIC | Age: 58
End: 2018-09-04
Attending: INTERNAL MEDICINE
Payer: COMMERCIAL

## 2018-09-04 ENCOUNTER — APPOINTMENT (OUTPATIENT)
Dept: LAB | Facility: CLINIC | Age: 58
End: 2018-09-04
Attending: INTERNAL MEDICINE
Payer: COMMERCIAL

## 2018-09-04 VITALS
RESPIRATION RATE: 16 BRPM | WEIGHT: 315 LBS | HEIGHT: 77 IN | DIASTOLIC BLOOD PRESSURE: 84 MMHG | BODY MASS INDEX: 37.19 KG/M2 | HEART RATE: 66 BPM | TEMPERATURE: 97.2 F | OXYGEN SATURATION: 97 % | SYSTOLIC BLOOD PRESSURE: 142 MMHG

## 2018-09-04 DIAGNOSIS — C15.5 CANCER OF DISTAL THIRD OF ESOPHAGUS (H): ICD-10-CM

## 2018-09-04 DIAGNOSIS — I15.8 OTHER SECONDARY HYPERTENSION: Primary | ICD-10-CM

## 2018-09-04 LAB
BASOPHILS # BLD AUTO: 0 10E9/L (ref 0–0.2)
BASOPHILS NFR BLD AUTO: 1.1 %
CREAT BLD-MCNC: 0.8 MG/DL (ref 0.66–1.25)
DIFFERENTIAL METHOD BLD: ABNORMAL
EOSINOPHIL # BLD AUTO: 0 10E9/L (ref 0–0.7)
EOSINOPHIL NFR BLD AUTO: 0.7 %
ERYTHROCYTE [DISTWIDTH] IN BLOOD BY AUTOMATED COUNT: 15 % (ref 10–15)
GFR SERPL CREATININE-BSD FRML MDRD: >90 ML/MIN/1.7M2
HCT VFR BLD AUTO: 35.1 % (ref 40–53)
HGB BLD-MCNC: 11.1 G/DL (ref 13.3–17.7)
IMM GRANULOCYTES # BLD: 0.1 10E9/L (ref 0–0.4)
IMM GRANULOCYTES NFR BLD: 1.8 %
LYMPHOCYTES # BLD AUTO: 0.8 10E9/L (ref 0.8–5.3)
LYMPHOCYTES NFR BLD AUTO: 29.3 %
MCH RBC QN AUTO: 28.9 PG (ref 26.5–33)
MCHC RBC AUTO-ENTMCNC: 31.6 G/DL (ref 31.5–36.5)
MCV RBC AUTO: 91 FL (ref 78–100)
MONOCYTES # BLD AUTO: 0.2 10E9/L (ref 0–1.3)
MONOCYTES NFR BLD AUTO: 8.1 %
NEUTROPHILS # BLD AUTO: 1.7 10E9/L (ref 1.6–8.3)
NEUTROPHILS NFR BLD AUTO: 59 %
NRBC # BLD AUTO: 0 10*3/UL
NRBC BLD AUTO-RTO: 0 /100
PLATELET # BLD AUTO: 130 10E9/L (ref 150–450)
RBC # BLD AUTO: 3.84 10E12/L (ref 4.4–5.9)
WBC # BLD AUTO: 2.8 10E9/L (ref 4–11)

## 2018-09-04 PROCEDURE — 36591 DRAW BLOOD OFF VENOUS DEVICE: CPT

## 2018-09-04 PROCEDURE — 25000128 H RX IP 250 OP 636: Mod: ZF | Performed by: INTERNAL MEDICINE

## 2018-09-04 PROCEDURE — 85025 COMPLETE CBC W/AUTO DIFF WBC: CPT | Performed by: INTERNAL MEDICINE

## 2018-09-04 PROCEDURE — G0463 HOSPITAL OUTPT CLINIC VISIT: HCPCS | Mod: ZF

## 2018-09-04 PROCEDURE — 99214 OFFICE O/P EST MOD 30 MIN: CPT | Mod: ZP | Performed by: INTERNAL MEDICINE

## 2018-09-04 RX ORDER — HEPARIN SODIUM (PORCINE) LOCK FLUSH IV SOLN 100 UNIT/ML 100 UNIT/ML
5 SOLUTION INTRAVENOUS ONCE
Status: COMPLETED | OUTPATIENT
Start: 2018-09-04 | End: 2018-09-04

## 2018-09-04 RX ORDER — HEPARIN SODIUM (PORCINE) LOCK FLUSH IV SOLN 100 UNIT/ML 100 UNIT/ML
500 SOLUTION INTRAVENOUS ONCE
Status: COMPLETED | OUTPATIENT
Start: 2018-09-04 | End: 2018-09-04

## 2018-09-04 RX ORDER — IOPAMIDOL 755 MG/ML
135 INJECTION, SOLUTION INTRAVASCULAR ONCE
Status: COMPLETED | OUTPATIENT
Start: 2018-09-04 | End: 2018-09-04

## 2018-09-04 RX ORDER — LISINOPRIL 10 MG/1
10 TABLET ORAL DAILY
Qty: 30 TABLET | Refills: 3 | Status: SHIPPED | OUTPATIENT
Start: 2018-09-04 | End: 2018-09-05

## 2018-09-04 RX ADMIN — IOPAMIDOL 135 ML: 755 INJECTION, SOLUTION INTRAVASCULAR at 14:37

## 2018-09-04 RX ADMIN — Medication 5 ML: at 14:18

## 2018-09-04 RX ADMIN — HEPARIN SODIUM (PORCINE) LOCK FLUSH IV SOLN 100 UNIT/ML 500 UNITS: 100 SOLUTION at 14:37

## 2018-09-04 ASSESSMENT — PAIN SCALES - GENERAL: PAINLEVEL: NO PAIN (0)

## 2018-09-04 NOTE — NURSING NOTE
Chief Complaint   Patient presents with     Port Draw     labs drawn via port by RN     There were no vitals taken for this visit.    Port accessed by RN. Labs collected and sent. Line flushed with NS & Heparin. Pt tolerated well.    Daniela Fisher RN

## 2018-09-04 NOTE — DISCHARGE INSTRUCTIONS

## 2018-09-04 NOTE — NURSING NOTE
"Oncology Rooming Note    September 4, 2018 3:30 PM   Reuben Padilla is a 58 year old male who presents for:    Chief Complaint   Patient presents with     Port Draw     labs drawn via port by RN     Oncology Clinic Visit     Return : Esophageal Cancer     Initial Vitals: BP (!) 152/94 (BP Location: Right arm, Patient Position: Sitting, Cuff Size: Adult Large)  Pulse 66  Temp 97.2  F (36.2  C) (Oral)  Resp 16  Ht 1.956 m (6' 5.01\")  Wt (!) 172.3 kg (379 lb 12.8 oz)  SpO2 97%  BMI 45.03 kg/m2 Estimated body mass index is 45.03 kg/(m^2) as calculated from the following:    Height as of this encounter: 1.956 m (6' 5.01\").    Weight as of this encounter: 172.3 kg (379 lb 12.8 oz). Body surface area is 3.06 meters squared.  No Pain (0) Comment: Data Unavailable   No LMP for male patient.  Allergies reviewed: Yes  Medications reviewed: Yes    Medications: Medication refills not needed today.  Pharmacy name entered into [employer]:    [id number] - [place] - [address] [facility] [place], [place] - [address] [place] [address] [phone]  [id number] - [place], [place] - [address] [place]    Clinical concerns: Patient states no further concerns at this time.     10 minutes for nursing intake (face to face time)     Natali Martinez CMA              "

## 2018-09-04 NOTE — MR AVS SNAPSHOT
After Visit Summary   9/4/2018    Reuben Padilla    MRN: [id number]           Patient Information     Date Of Birth          1960        Visit Information        Provider Department      9/4/2018 3:30 PM Kadi Dee MD [facility]        Today's Diagnoses     Other secondary hypertension    -  1    Cancer of distal third of esophagus (H)           Follow-ups after your visit        Your next 10 appointments already scheduled     Sep 11, 2018 12:30 PM CDT   Infusion 120 with UC ONCOLOGY INFUSION, UC 10 ATC   [facility] ([facility])    [address] [place] [address]  [address] [place] [id number] [phone]            Sep 18, 2018 10:30 AM CDT   Infusion 120 with UC ONCOLOGY INFUSION, UC 27 ATC   [facility] ([facility])    [address] [place] [address]  [address] [place] [id number] [phone]              Who to contact     If you have questions or need follow up information about today's clinic visit or your schedule please contact [facility] directly at [phone].  Normal or non-critical lab and imaging results will be communicated to you by [facility], letter or phone within 4 business days after the clinic has received the results. If you do not hear from us within 7 days, please contact the clinic through [facility] or phone. If you have a critical or abnormal lab result, we will notify you by phone as soon as possible.  Submit refill requests through [employer] or call your pharmacy and they will forward the refill request to us. Please allow 3 business days for your refill to be completed.          Additional Information About Your Visit        MyChart Information     [employer] gives you secure access to your electronic health record. If you see a primary care provider, you can also send messages to your care team and make appointments. If you have questions,  "please call your primary care clinic.  If you do not have a primary care provider, please call [phone] and they will assist you.        Care EveryWhere ID     This is your Care EveryWhere ID. This could be used by other organizations to access your [facility] medical records  [id number]        Your Vitals Were     Pulse Temperature Respirations Height Pulse Oximetry BMI (Body Mass Index)    66 97.2  F (36.2  C) (Oral) 16 1.956 m (6' 5.01\") 97% 45.03 kg/m2       Blood Pressure from Last 3 Encounters:   09/04/18 142/84   08/28/18 145/88   08/17/18 (!) 129/91    Weight from Last 3 Encounters:   09/04/18 (!) 172.3 kg (379 lb 12.8 oz)   08/28/18 (!) 168.7 kg (372 lb)   08/17/18 (!) 168 kg (370 lb 6 oz)              We Performed the Following     CBC with platelets differential          Today's Medication Changes          These changes are accurate as of 9/4/18 11:59 PM.  If you have any questions, ask your nurse or doctor.               Start taking these medicines.        Dose/Directions    ketamine (KETALAR) 5%-gabapentin (NEURONTIN) 8%-lidocaine 2.5% 5%-8% Gel topical PLO cream   Used for:  Cancer of distal third of esophagus (H), Other secondary hypertension   Started by:  Kadi Dee MD        Use small amount topically to feet three times daily   Quantity:  30 g   Refills:  3       lisinopril 10 MG tablet   Commonly known as:  PRINIVIL/ZESTRIL   Used for:  Other secondary hypertension, Cancer of distal third of esophagus (H)   Started by:  Kadi Dee MD        Dose:  10 mg   Take 1 tablet (10 mg) by mouth daily   Quantity:  30 tablet   Refills:  3            Where to get your medicines      These medications were sent to [id number] [address] [facility]  [address] [facility] [address]     Phone:  [phone]     lisinopril 10 MG tablet         Some of these will need a paper prescription and others can be bought over the counter.  Ask your nurse if you have " questions.     Bring a paper prescription for each of these medications     ketamine (KETALAR) 5%-gabapentin (NEURONTIN) 8%-lidocaine 2.5% 5%-8% Gel topical PLO cream                Primary Care Provider Fax #    Physician No Ref-Primary [phone]       No address on file        Equal Access to Services     OLLIE MCDOWELLMANUELA : Hadii aad ku hadolgaderik Sojose antonioali, waaxda luqadaha, qaybta kaalmada adeegjamijuancarlos, waxay idiin haylanotilio paintingpalthao cardenas. So [facility] [phone].    ATENCIÓN: Si habla español, tiene a alamo disposición servicios gratuitos de asistencia lingüística. Ariannaame al [phone].    We comply with applicable federal civil rights laws and [place] laws. We do not discriminate on the basis of race, color, national origin, age, disability, sex, sexual orientation, or gender identity.            Thank you!     Thank you for choosing [facility]  for your care. Our goal is always to provide you with excellent care. Hearing back from our patients is one way we can continue to improve our services. Please take a few minutes to complete the written survey that you may receive in the mail after your visit with us. Thank you!             Your Updated Medication List - Protect others around you: Learn how to safely use, store and throw away your medicines at www.disposemymeds.org.          This list is accurate as of 9/4/18 11:59 PM.  Always use your most recent med list.                   Brand Name Dispense Instructions for use Diagnosis    diphenoxylate-atropine 2.5-0.025 MG per tablet    LOMOTIL    40 tablet    Take 1 tablet by mouth 4 times daily as needed for diarrhea    Diarrhea, unspecified type       * enoxaparin 80 MG/0.8ML injection    LOVENOX    60 Syringe    Inject 1.3 mLs (130 mg) Subcutaneous 2 times daily    Cancer of distal third of esophagus (H)       * enoxaparin 150 MG/ML injection    LOVENOX     INJECT 0.86ML (130MG) TWICE A DAY FOR ACUTE PULMONARY EMBOLISM AND ESOPHAGEAL MALIGNANCY         fish oil-omega-3 fatty acids 1000 MG capsule      Take 2 g by mouth daily        furosemide 20 MG tablet    LASIX    30 tablet    Take 1 tablet (20 mg) by mouth daily    Cancer of distal third of esophagus (H), Generalized edema       gabapentin 300 MG capsule    NEURONTIN    180 capsule    Two tablets in the am and one tablet in the afternoon and evening    Chemotherapy-induced neuropathy (H)       ketamine (KETALAR) 5%-gabapentin (NEURONTIN) 8%-lidocaine 2.5% 5%-8% Gel topical PLO cream     30 g    Use small amount topically to feet three times daily    Cancer of distal third of esophagus (H), Other secondary hypertension       latanoprost 0.005 % ophthalmic solution    XALATAN    1 Bottle    Place 1 drop into both eyes At Bedtime    Cancer of distal third of esophagus (H)       lidocaine-prilocaine cream    EMLA    30 g    Apply topically as needed for moderate pain    Cancer of distal third of esophagus (H)       lisinopril 10 MG tablet    PRINIVIL/ZESTRIL    30 tablet    Take 1 tablet (10 mg) by mouth daily    Other secondary hypertension, Cancer of distal third of esophagus (H)       * LORazepam 0.5 MG tablet    ATIVAN    30 tablet    Take 1 tablet (0.5 mg) by mouth every 4 hours as needed (Anxiety, Nausea/Vomiting or Sleep)    Cancer of distal third of esophagus (H)       * LORazepam 0.5 MG tablet    ATIVAN    30 tablet    Take 1 tablet (0.5 mg) by mouth every 4 hours as needed (Anxiety, Nausea/Vomiting or Sleep)    Cancer of distal third of esophagus (H)       Multi-vitamin Tabs tablet      Take 1 tablet by mouth daily        omeprazole 40 MG capsule    priLOSEC    90 capsule    Take 1 capsule (40 mg) by mouth daily    Cancer of distal third of esophagus (H)       ondansetron 8 MG tablet    ZOFRAN    30 tablet    Take 1 tablet (8 mg) by mouth every 8 hours as needed (Nausea/Vomiting)    Cancer of distal third of esophagus (H)       prochlorperazine 10 MG tablet    COMPAZINE    30 tablet    Take 1  tablet (10 mg) by mouth every 6 hours as needed (Nausea/Vomiting)    Cancer of distal third of esophagus (H)       tadalafil 5 MG tablet    CIALIS    20 tablet    Take 2 tablets (10 mg) by mouth every 24 hours As needed for sexual activity    Malignant neoplasm of lower third of esophagus (H), Drug-induced erectile dysfunction       timolol 0.5 % ophthalmic solution    TIMOPTIC     Place into both eyes daily    Cancer of distal third of esophagus (H)       zolpidem 10 MG tablet    AMBIEN    60 tablet    Take 1 tablet (10 mg) by mouth nightly as needed    Metastasis from gastric cancer (H)       * Notice:  This list has 4 medication(s) that are the same as other medications prescribed for you. Read the directions carefully, and ask your doctor or other care provider to review them with you.

## 2018-09-04 NOTE — LETTER
9/4/2018       RE: Reuben Padilla  [address] [facility] [place] [address]     Dear Colleague,    Thank you for referring your patient, Reuben Padilla, to the [facility]. Please see a copy of my visit note below.    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 His tumor is positive for cytokeratin 20, negative for P63 and CK7, and HER2 is negative. He started on FOLFOX (5FU/oxaliplatin) on 5/15/2017. He had a delay in treatment from 9/5-10/2/17 due to work related injury.    He had an excellent response by imaging throughout the summer 2017.    He a mixed response by imaging in late fall 2017.    In January 2018, he was switched to taxol and cyramza - had slight progression and neuropathy and clinical trial became available.       In March 2018, he was started on the [facility] Match Clinical Trial with crizotinib.  (MET amplification) He remained on crizotinib from March - July 2018.    INTERVAL HISTORY: Levi comes in today for followup.  Overall, Reuben is doing reasonably well.  He believes his neuropathy is stable.  He does not have numbness to the knees.  He however notices burning in his feet bilaterally, particularly the heels.      He has had no problems with balance or falls.  No numbness or tingling in his fingertips.  He reports no fevers or chills.  He thinks that his dysphagia is stable.  He monitors his diet to avoid dysphagia.     No nausea or vomiting.  He is active and is going on a 4 wheel riding of the [employer] later this week.     His 10-point review of systems is otherwise negative.        PHYSICAL EXAMINATION  /84 (BP Location: Right arm, Patient Position: Sitting, Cuff Size: Adult Regular)  Pulse 66  Temp 97.2  F (36.2  C) (Oral)  Resp 16  Ht 1.956  "m (6' 5.01\")  Wt (!) 172.3 kg (379 lb 12.8 oz)  SpO2 97%  BMI 45.03 kg/m2 /90  Wt Readings from Last 4 Encounters:   09/04/18 (!) 172.3 kg (379 lb 12.8 oz)   08/28/18 (!) 168.7 kg (372 lb)   08/17/18 (!) 168 kg (370 lb 6 oz)   08/07/18 (!) 168.6 kg (371 lb 9.6 oz)     Constitutional: Alert, oriented male in no visible distress.  Eyes: PERRL. Anicteric sclerae.  ENT/Mouth: OM moist and pink without lesions or thrush.  CV: RRR  Resp: CTAB throughout  Abdomen: Soft, non-tender, non-distended. Obese. Bowel sounds present. Unable to palpate liver or spleen.   Extremities: trace edema   Skin: Warm, dry.   Lymph: No cervical or supraclavicular lymphadenopathy appreciated.   Neuro: CN II-XII grossly intact.  Absent reflexed at knees bilaterally    ECOG PS: 1  Lab Results   Component Value Date    WBC 2.8 09/04/2018     Lab Results   Component Value Date    RBC 3.84 09/04/2018     Lab Results   Component Value Date    HGB 11.1 09/04/2018     Lab Results   Component Value Date    HCT 35.1 09/04/2018     No components found for: MCT  Lab Results   Component Value Date    MCV 91 09/04/2018     Lab Results   Component Value Date    MCH 28.9 09/04/2018     Lab Results   Component Value Date    MCHC 31.6 09/04/2018     Lab Results   Component Value Date    RDW 15.0 09/04/2018     Lab Results   Component Value Date     09/04/2018       Recent Labs   Lab Test  07/24/18   1516  07/17/18   1250   NA  140  143   POTASSIUM  4.2  4.5   CHLORIDE  108  109   CO2  26  24   ANIONGAP  6  10   GLC  100*  95   BUN  10  15   CR  0.91  1.01   NIDHI  8.4*  8.6     Liver Function Studies -   Recent Labs   Lab Test  08/17/18   0815   PROTTOTAL  6.7*   ALBUMIN  3.1*   BILITOTAL  0.4   ALKPHOS  104   AST  19   ALT  20     CT CAP - reviewed personally by me with radiology       [facility] testing - MET amplification and KRAS amplification; results scanned in computer       IMPRESSION/PLAN:  1. Metastatic esophogeal adenocarcinoma. He " has had treatment with FOLFOX and then transitioned to Taxol with ramicurimab.  He was then on crizotinib on the [facility] Match study.  He progressed on this and returned on taxol/cyramza.  He is  Tolerating this well and by CT has had a nice response to therapy.      We discussed continuing his therapy day 1, 8 every 21 days.      His [facility] One testing did return with low positive PDL 1 status, indicating he could be a candidate for Keytruda.  This was approved by insurance.    Also discussed that he should continue to enjoy his time and complete any potential travel plans he has, possibly a trip to [place], in the near future given that he is currently feeling well.    2. Edema -  stable     3. He has a history of pulmonary embolism.  He is on Lovenox twice daily.  He has progressed through Xarelto in the past.       4. He does have baseline neuropathy.  Gabapentin is maintained at 600/300/600 mg daily.      He understands his therapies are not curative intent, but rather palliative.    Kadi Dee

## 2018-09-05 RX ORDER — LISINOPRIL 10 MG/1
10 TABLET ORAL DAILY
Qty: 30 TABLET | Refills: 3 | Status: SHIPPED | OUTPATIENT
Start: 2018-09-05 | End: 2018-10-09

## 2018-09-06 RX ORDER — MEPERIDINE HYDROCHLORIDE 25 MG/ML
25 INJECTION INTRAMUSCULAR; INTRAVENOUS; SUBCUTANEOUS EVERY 30 MIN PRN
Status: CANCELLED | OUTPATIENT
Start: 2018-09-11

## 2018-09-06 RX ORDER — LORAZEPAM 2 MG/ML
0.5 INJECTION INTRAMUSCULAR EVERY 4 HOURS PRN
Status: CANCELLED
Start: 2018-09-11

## 2018-09-06 RX ORDER — METHYLPREDNISOLONE SODIUM SUCCINATE 125 MG/2ML
125 INJECTION, POWDER, LYOPHILIZED, FOR SOLUTION INTRAMUSCULAR; INTRAVENOUS
Status: CANCELLED
Start: 2018-09-11

## 2018-09-06 RX ORDER — HEPARIN SODIUM (PORCINE) LOCK FLUSH IV SOLN 100 UNIT/ML 100 UNIT/ML
500 SOLUTION INTRAVENOUS EVERY 8 HOURS
Status: CANCELLED
Start: 2018-09-11

## 2018-09-06 RX ORDER — DIPHENHYDRAMINE HCL 25 MG
50 CAPSULE ORAL ONCE
Status: CANCELLED
Start: 2018-09-18

## 2018-09-06 RX ORDER — METHYLPREDNISOLONE SODIUM SUCCINATE 125 MG/2ML
125 INJECTION, POWDER, LYOPHILIZED, FOR SOLUTION INTRAMUSCULAR; INTRAVENOUS
Status: CANCELLED
Start: 2018-09-18

## 2018-09-06 RX ORDER — EPINEPHRINE 1 MG/ML
0.3 INJECTION, SOLUTION INTRAMUSCULAR; SUBCUTANEOUS EVERY 5 MIN PRN
Status: CANCELLED | OUTPATIENT
Start: 2018-09-18

## 2018-09-06 RX ORDER — EPINEPHRINE 1 MG/ML
0.3 INJECTION, SOLUTION INTRAMUSCULAR; SUBCUTANEOUS EVERY 5 MIN PRN
Status: CANCELLED | OUTPATIENT
Start: 2018-09-11

## 2018-09-06 RX ORDER — SODIUM CHLORIDE 9 MG/ML
1000 INJECTION, SOLUTION INTRAVENOUS CONTINUOUS PRN
Status: CANCELLED
Start: 2018-09-18

## 2018-09-06 RX ORDER — MEPERIDINE HYDROCHLORIDE 25 MG/ML
25 INJECTION INTRAMUSCULAR; INTRAVENOUS; SUBCUTANEOUS EVERY 30 MIN PRN
Status: CANCELLED | OUTPATIENT
Start: 2018-09-18

## 2018-09-06 RX ORDER — EPINEPHRINE 0.3 MG/.3ML
0.3 INJECTION SUBCUTANEOUS EVERY 5 MIN PRN
Status: CANCELLED | OUTPATIENT
Start: 2018-09-11

## 2018-09-06 RX ORDER — ALBUTEROL SULFATE 90 UG/1
1-2 AEROSOL, METERED RESPIRATORY (INHALATION)
Status: CANCELLED
Start: 2018-09-18

## 2018-09-06 RX ORDER — DIPHENHYDRAMINE HYDROCHLORIDE 50 MG/ML
50 INJECTION INTRAMUSCULAR; INTRAVENOUS
Status: CANCELLED
Start: 2018-09-18

## 2018-09-06 RX ORDER — DIPHENHYDRAMINE HYDROCHLORIDE 50 MG/ML
50 INJECTION INTRAMUSCULAR; INTRAVENOUS
Status: CANCELLED
Start: 2018-09-11

## 2018-09-06 RX ORDER — ALBUTEROL SULFATE 90 UG/1
1-2 AEROSOL, METERED RESPIRATORY (INHALATION)
Status: CANCELLED
Start: 2018-09-11

## 2018-09-06 RX ORDER — ALBUTEROL SULFATE 0.83 MG/ML
2.5 SOLUTION RESPIRATORY (INHALATION)
Status: CANCELLED | OUTPATIENT
Start: 2018-09-11

## 2018-09-06 RX ORDER — HEPARIN SODIUM (PORCINE) LOCK FLUSH IV SOLN 100 UNIT/ML 100 UNIT/ML
500 SOLUTION INTRAVENOUS EVERY 8 HOURS
Status: CANCELLED
Start: 2018-09-18

## 2018-09-06 RX ORDER — EPINEPHRINE 0.3 MG/.3ML
0.3 INJECTION SUBCUTANEOUS EVERY 5 MIN PRN
Status: CANCELLED | OUTPATIENT
Start: 2018-09-18

## 2018-09-06 RX ORDER — DIPHENHYDRAMINE HCL 25 MG
50 CAPSULE ORAL ONCE
Status: CANCELLED
Start: 2018-09-11

## 2018-09-06 RX ORDER — LORAZEPAM 2 MG/ML
0.5 INJECTION INTRAMUSCULAR EVERY 4 HOURS PRN
Status: CANCELLED
Start: 2018-09-18

## 2018-09-06 RX ORDER — SODIUM CHLORIDE 9 MG/ML
1000 INJECTION, SOLUTION INTRAVENOUS CONTINUOUS PRN
Status: CANCELLED
Start: 2018-09-11

## 2018-09-06 RX ORDER — ALBUTEROL SULFATE 0.83 MG/ML
2.5 SOLUTION RESPIRATORY (INHALATION)
Status: CANCELLED | OUTPATIENT
Start: 2018-09-18

## 2018-09-06 NOTE — PROGRESS NOTES
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 His tumor is positive for cytokeratin 20, negative for P63 and CK7, and HER2 is negative. He started on FOLFOX (5FU/oxaliplatin) on 5/15/2017. He had a delay in treatment from 9/5-10/2/17 due to work related injury.    He had an excellent response by imaging throughout the summer 2017.    He a mixed response by imaging in late fall 2017.    In January 2018, he was switched to taxol and cyramza - had slight progression and neuropathy and clinical trial became available.       In March 2018, [facility] was [facility] with crizotinib.  (MET amplification) He remained on crizotinib from March - July 2018.    INTERVAL HISTORY: Levi comes in today for followup.  Overall, Reuben is doing reasonably well.  He believes his neuropathy is stable.  He does not have numbness to the knees.  He however notices burning in his feet bilaterally, particularly the heels.      He has had no problems with balance or falls.  No numbness or tingling in his fingertips.  He reports no fevers or chills.  He thinks that his dysphagia is stable.  He monitors his diet to avoid dysphagia.     No nausea or vomiting.  He is active and is going on a 4 wheel riding of the [employer] later this week.     His 10-point review of systems is otherwise negative.        PHYSICAL EXAMINATION  /84 (BP Location: Right arm, Patient Position: Sitting, Cuff Size: Adult Regular)  Pulse 66  Temp 97.2  F (36.2  C) (Oral)  Resp 16  Ht 1.956 m (6' 5.01\")  Wt (!) 172.3 kg (379 lb 12.8 oz)  SpO2 97%  BMI 45.03 kg/m2 /90  Wt Readings from Last 4 Encounters:   09/04/18 (!) 172.3 kg (379 lb 12.8 oz)   08/28/18 (!) 168.7 kg (372 lb)   08/17/18 (!) 168 kg (370 lb 6 oz) "   08/07/18 (!) 168.6 kg (371 lb 9.6 oz)     Constitutional: Alert, oriented male in no visible distress.  Eyes: PERRL. Anicteric sclerae.  ENT/Mouth: OM moist and pink without lesions or thrush.  CV: RRR  Resp: CTAB throughout  Abdomen: Soft, non-tender, non-distended. Obese. Bowel sounds present. Unable to palpate liver or spleen.   Extremities: trace edema   Skin: Warm, dry.   Lymph: No cervical or supraclavicular lymphadenopathy appreciated.   Neuro: CN II-XII grossly intact.  Absent reflexed at knees bilaterally    ECOG PS: 1  Lab Results   Component Value Date    WBC 2.8 09/04/2018     Lab Results   Component Value Date    RBC 3.84 09/04/2018     Lab Results   Component Value Date    HGB 11.1 09/04/2018     Lab Results   Component Value Date    HCT 35.1 09/04/2018     No components found for: MCT  Lab Results   Component Value Date    MCV 91 09/04/2018     Lab Results   Component Value Date    MCH 28.9 09/04/2018     Lab Results   Component Value Date    MCHC 31.6 09/04/2018     Lab Results   Component Value Date    RDW 15.0 09/04/2018     Lab Results   Component Value Date     09/04/2018       Recent Labs   Lab Test  07/24/18   1516  07/17/18   1250   NA  140  143   POTASSIUM  4.2  4.5   CHLORIDE  108  109   CO2  26  24   ANIONGAP  6  10   GLC  100*  95   BUN  10  15   CR  0.91  1.01   NIDHI  8.4*  8.6     Liver Function Studies -   Recent Labs   Lab Test  08/17/18   0815   PROTTOTAL  6.7*   ALBUMIN  3.1*   BILITOTAL  0.4   ALKPHOS  104   AST  19   ALT  20     CT CAP - reviewed personally by me with radiology       [facility] testing - MET amplification and KRAS amplification; results scanned in computer       IMPRESSION/PLAN:  1. Metastatic esophogeal adenocarcinoma. He has had treatment with FOLFOX and then transitioned to Taxol with ramicurimab.  He was then on crizotinib on the [facility] Match study.  He progressed on this and returned on taxol/cyramza.  He is  Tolerating this well and by CT has had a  nice response to therapy.      We discussed continuing his therapy day 1, 8 every 21 days.      His [facility] One testing did return with low positive PDL 1 status, indicating he could be a candidate for Keytruda.  This was approved by insurance.    Also discussed that he should continue to enjoy his time and complete any potential travel plans he has, possibly a trip to [place], in the near future given that he is currently feeling well.    2. Edema -  stable     3. He has a history of pulmonary embolism.  He is on Lovenox twice daily.  He has progressed through Xarelto in the past.       4. He does have baseline neuropathy.  Gabapentin is maintained at 600/300/600 mg daily.      He understands his therapies are not curative intent, but rather palliative.    Kadi Dee

## 2018-09-07 ENCOUNTER — CARE COORDINATION (OUTPATIENT)
Dept: ONCOLOGY | Facility: CLINIC | Age: 58
End: 2018-09-07

## 2018-09-07 DIAGNOSIS — C15.5 CANCER OF DISTAL THIRD OF ESOPHAGUS (H): Primary | ICD-10-CM

## 2018-09-07 NOTE — PROGRESS NOTES
CBC, urine protein, and hepatic panel lab orders faxed to [facility] at [phone]. OK results received via [facility]. Spoke with patient to confirm. He voiced understand and states he is doing well. He has no questions or concerns at this time.

## 2018-09-10 ENCOUNTER — TRANSFERRED RECORDS (OUTPATIENT)
Dept: HEALTH INFORMATION MANAGEMENT | Facility: CLINIC | Age: 58
End: 2018-09-10

## 2018-09-10 DIAGNOSIS — Z86.711 HX OF PULMONARY EMBOLUS: Primary | ICD-10-CM

## 2018-09-10 LAB
ALBUMIN SERPL-MCNC: 3.7 G/DL
ALP SERPL-CCNC: 67 U/L
ALT SERPL-CCNC: 13 U/L
AMORPHOUS URATES: NORMAL
AST SERPL-CCNC: 17 U/L
BACTERIA URINE: NORMAL
BASOPHILS # BLD AUTO: 0 10*3/UL
BASOPHILS NFR BLD AUTO: 0.7 %
BILIRUB SERPL-MCNC: 0.9 MG/DL
BILIRUB UR QL STRIP: NORMAL
BILIRUBIN DIRECT: 0.3 MG/DL
BLOOD URINE DIP: NORMAL
CLARITY: NORMAL
COLOR UR: NORMAL
CRYSTAL URINE: NORMAL
EOSINOPHIL # BLD AUTO: 0 10*3/UL
EOSINOPHIL NFR BLD AUTO: 1.1 %
EPITHELIAL CELLS: NORMAL
ERYTHROCYTE [DISTWIDTH] IN BLOOD BY AUTOMATED COUNT: 16.4 %
GLUCOSE UR STRIP-MCNC: NORMAL MG/DL
HCT VFR BLD AUTO: 36.8 %
HEMOGLOBIN: 12.1 G/DL
ICTOTEST: NORMAL
KETONES UR QL STRIP: NORMAL
LEUKOCYTE ESTERASE URINE DIP: NORMAL
LYMPHOCYTES # BLD AUTO: 0.7 10*3/UL
LYMPHOCYTES NFR BLD AUTO: 25.8 %
Lab: NORMAL
MCH RBC QN AUTO: 29.6 PG
MCHC RBC AUTO-ENTMCNC: 32.9 G/DL
MCV RBC AUTO: 90 FL
MONOCYTES # BLD AUTO: 0.1 10*3/UL
MONOCYTES NFR BLD AUTO: 5.2 %
MUCOUS URINE: NORMAL
NEUTROPHILS # BLD AUTO: 1.8 10*3/UL
NEUTROPHILS NFR BLD AUTO: 67.2 %
NITRITE UR QL STRIP: NORMAL
PH UR STRIP: NORMAL PH (ref 5–7)
PLATELET COUNT - QUEST: 147 10^9/L (ref 150–450)
PROT SERPL-MCNC: 6.7 G/DL
PROT UR QL: NORMAL MG/DL
PROTEIN UR: 26.7 MG/DL
RBC # BLD AUTO: 4.09 10^12/L
RBC URINE: NORMAL
SP GR UR STRIP: NORMAL (ref 1–1.03)
SPERM: NORMAL
URINE CREATININE MG/DL - QUEST: 112.3 MG/DL
UROBILINOGEN UR QL STRIP: NORMAL EU/DL (ref 0.2–1)
WBC # BLD AUTO: 2.7 10^9/L
WBC URINE: NORMAL

## 2018-09-11 ENCOUNTER — INFUSION THERAPY VISIT (OUTPATIENT)
Dept: ONCOLOGY | Facility: CLINIC | Age: 58
End: 2018-09-11
Attending: PHYSICIAN ASSISTANT
Payer: COMMERCIAL

## 2018-09-11 VITALS
RESPIRATION RATE: 16 BRPM | DIASTOLIC BLOOD PRESSURE: 86 MMHG | OXYGEN SATURATION: 97 % | TEMPERATURE: 97.7 F | HEART RATE: 87 BPM | SYSTOLIC BLOOD PRESSURE: 158 MMHG

## 2018-09-11 DIAGNOSIS — C15.5 CANCER OF DISTAL THIRD OF ESOPHAGUS (H): Primary | ICD-10-CM

## 2018-09-11 PROCEDURE — G0463 HOSPITAL OUTPT CLINIC VISIT: HCPCS | Mod: 25

## 2018-09-11 PROCEDURE — 96367 TX/PROPH/DG ADDL SEQ IV INF: CPT

## 2018-09-11 PROCEDURE — 25000132 ZZH RX MED GY IP 250 OP 250 PS 637: Mod: ZF | Performed by: INTERNAL MEDICINE

## 2018-09-11 PROCEDURE — 96413 CHEMO IV INFUSION 1 HR: CPT

## 2018-09-11 PROCEDURE — 25000128 H RX IP 250 OP 636: Mod: ZF | Performed by: INTERNAL MEDICINE

## 2018-09-11 PROCEDURE — 96375 TX/PRO/DX INJ NEW DRUG ADDON: CPT

## 2018-09-11 PROCEDURE — 96417 CHEMO IV INFUS EACH ADDL SEQ: CPT

## 2018-09-11 PROCEDURE — 25000125 ZZHC RX 250: Mod: ZF | Performed by: INTERNAL MEDICINE

## 2018-09-11 RX ORDER — HEPARIN SODIUM (PORCINE) LOCK FLUSH IV SOLN 100 UNIT/ML 100 UNIT/ML
500 SOLUTION INTRAVENOUS EVERY 8 HOURS
Status: DISCONTINUED | OUTPATIENT
Start: 2018-09-11 | End: 2018-09-11 | Stop reason: HOSPADM

## 2018-09-11 RX ORDER — DIPHENHYDRAMINE HCL 25 MG
50 CAPSULE ORAL ONCE
Status: COMPLETED | OUTPATIENT
Start: 2018-09-11 | End: 2018-09-11

## 2018-09-11 RX ADMIN — PACLITAXEL 240 MG: 6 INJECTION, SOLUTION INTRAVENOUS at 14:34

## 2018-09-11 RX ADMIN — DEXAMETHASONE SODIUM PHOSPHATE 20 MG: 10 INJECTION, SOLUTION INTRAMUSCULAR; INTRAVENOUS at 14:14

## 2018-09-11 RX ADMIN — SODIUM CHLORIDE 1300 MG: 9 INJECTION, SOLUTION INTRAVENOUS at 15:45

## 2018-09-11 RX ADMIN — SODIUM CHLORIDE 250 ML: 9 INJECTION, SOLUTION INTRAVENOUS at 13:57

## 2018-09-11 RX ADMIN — DIPHENHYDRAMINE HYDROCHLORIDE 50 MG: 25 CAPSULE ORAL at 13:58

## 2018-09-11 RX ADMIN — FAMOTIDINE 20 MG: 20 INJECTION, SOLUTION INTRAVENOUS at 14:00

## 2018-09-11 RX ADMIN — Medication 500 UNITS: at 16:48

## 2018-09-11 NOTE — PROGRESS NOTES
Infusion Nursing Note:  Reuben Padilla presents today for C3D1 Cyramza, Taxol.    Patient seen by provider today: No    Note:   Patient had labs drawn on 9/10/18 near his home in [place], [place]. Results WNL.  Patient's blood pressure elevated today, this RN contacted Dr. Dee about patient's Cyramza and his hypertension.   TORB: Dr. Dee/Dora Salazar RN:  - Hold Cyramza today  - OK to give Taxol only  - Have patient increase his Lisinopril to 20 mg daily.  -Patient to have his blood pressure checked locally and patient to call clinic if continues to increase.    New orders relayed to patient. Patient upset that he couldn't receive his Cyramza today due to elevated blood pressure.  Patient had this RN reckeck his blood pressure twice more and finally got a decreased reading of 158/86.  Patient asked this RN to notify Dr. Dee of the decreased reading so she would allow him to receive Cyramza today.  This RN repaged Dr. Dee.    TORB: Dr. Dee/Dora Salazar RN:  -OK to give Cyramza today  -Patient to still increase his Lisinopril to 20 mg daily.    Patient updated on the new plan and verbalized understanding.  Patient tolerated infusions without difficultly today.    Intravenous Access:  Implanted Port.  Patient had port accessed at [facility] on 9/10/18.      Treatment Conditions:  Lab Results   Component Value Date    HGB 12.1 09/10/2018     Lab Results   Component Value Date    WBC 2.7 09/10/2018      Lab Results   Component Value Date    ANEU 1.8 09/10/2018     Lab Results   Component Value Date     09/10/2018     09/04/2018      Lab Results   Component Value Date     07/24/2018                   Lab Results   Component Value Date    POTASSIUM 4.2 07/24/2018           Lab Results   Component Value Date    MAG 1.9 07/17/2018            Lab Results   Component Value Date    CR 0.91 07/24/2018                   Lab Results   Component Value Date    NIDHI 8.4 07/24/2018                 Lab Results   Component Value Date    BILITOTAL 0.9 09/10/2018           Lab Results   Component Value Date    ALBUMIN 3.7 09/10/2018                    Lab Results   Component Value Date    ALT 13 09/10/2018           Lab Results   Component Value Date    AST 17 09/10/2018       Results reviewed, labs MET treatment parameters, ok to proceed with treatment.      Post Infusion Assessment:  Patient tolerated infusion without incident.  Blood return noted pre and post infusion.  Site patent and intact, free from redness, edema or discomfort.  No evidence of extravasations.  Access discontinued per protocol.    Discharge Plan:   Patient declined prescription refills.  Patient and/or family verbalized understanding of discharge instructions and all questions answered.  AVS to patient via [employer].  Patient will return 9/18/18 for C3D8 Taxol next appointment.   Patient discharged in stable condition accompanied by: self.  Departure Mode: Ambulatory.    Dora Salazar RN

## 2018-09-11 NOTE — MR AVS SNAPSHOT
After Visit Summary   9/11/2018    Reuben Padilla    MRN: [id number]           Patient Information     Date Of Birth          1960        Visit Information        Provider Department      9/11/2018 12:30 PM UC 10 ATC;  ONCOLOGY INFUSION [facility]        Today's Diagnoses     Cancer of distal third of esophagus (H)    -  1      Care Instructions    Contact Numbers    [facility] Main Line: [phone]    Triage Nurse Line: [phone]      Please call the [facility] Nurse Triage line if you experience a temperature greater than or equal to 100.5, shaking chills, have uncontrolled nausea, vomiting and/or diarrhea, dizziness, shortness of breath, bleeding not relieved with pressure, or if you have any other questions or concerns.     If it is after hours, weekends, or holidays, please call the [phone] and a nurse will be able to triage your call.    If you are having any concerning symptoms or wish to speak to a provider before your next infusion visit, please call your care coordinator or triage them so we can adequately serve you.      If you need to refill your narcotic prescription or other medication, please call triage before your infusion appointment.        September 2018 Sunday Monday Tuesday Wednesday Thursday Friday Saturday                                 1       2     3     4     [facility] LAB DRAW    1:15 PM   (15 min.)   [facility] LAB DRAW   [facility] Lab Draw     CT CHEST/ABDOMEN/PELVIS W    1:40 PM   (20 min.)   UCCT2   [facility] CT     UMP RETURN    3:15 PM   (30 min.)   Kadi Dee MD   [facility] 5     6     7     8       9     10     11     UMP ONC INFUSION 120   12:30 PM   (120 min.)    ONCOLOGY INFUSION   [facility] 12     13     14     15       16     17     18     UMP ONC INFUSION 120   10:30 AM   (120 min.)    ONCOLOGY INFUSION   [facility]  Clinic 19     20     21     22       23     24     25     26     27     28     29       30 October 2018 Sunday Monday Tuesday Wednesday Thursday Friday Saturday        1     2     UMP RETURN   11:15 AM   (30 min.)   Kadi Dee MD   [facility]     UMP ONC INFUSION 120    1:30 PM   (120 min.)   UC ONCOLOGY INFUSION   [facility] 3     4     5     6       7     8     9     UMP RETURN   11:35 AM   (50 min.)   Katalina Ramirez PA-C   [facility]     UMP ONC INFUSION 120    1:30 PM   (120 min.)   UC ONCOLOGY INFUSION   [facility] 10     11     12     13       14     15     16     17     18     19     20       21     22     23     CT CHEST/ABDOMEN/PELVIS W    9:00 AM   (20 min.)   UCCT2   [facility] CT     UMP RETURN   11:45 AM   (30 min.)   Kadi Dee MD   [facility] 24     25     26     27       28     29     30     31                              No results found for this or any previous visit (from the past 24 hour(s)).              Follow-ups after your visit        Your next 10 appointments already scheduled     Sep 18, 2018 10:30 AM CDT   Infusion 120 with UC ONCOLOGY INFUSION, UC 27 ATC   [facility] ([facility])    [address]  [address]  [facility] [address]   [phone]            Oct 02, 2018 11:30 AM CDT   (Arrive by 11:15 AM)   Return Visit with Kadi Dee MD   [facility] ([facility])    [address]  [address]  [facility] [address]   [phone]            Oct 02, 2018  1:30 PM CDT   Infusion 120 with UC ONCOLOGY INFUSION, UC 27 ATC   [facility] ([facility])    [address]  [address]  [facility] [address]   [phone]            Oct 09, 2018  11:50 AM CDT   (Arrive by 11:35 AM)   Return Visit with Katalina Ramirez PA-C   [facility] ([facility])    [address]  [address]  [facility] [address]   [phone]            Oct 09, 2018  1:30 PM CDT   Infusion 120 with UC ONCOLOGY INFUSION, UC 27 ATC   [facility] ([facility])    [address]  [address]  [facility] [address]   [phone]            Oct 23, 2018  9:00 AM CDT   CT CHEST/ABDOMEN/PELVIS W CONTRAST with UCCT2   [facility] CT ([facility])    [address]  1st [address]  [facility] [address] [phone]           How do I prepare for my exam? (Food and drink instructions) To prepare: Do not eat or drink for 2 hours before your exam. If you need to take medicine, you may take it with small sips of water. (We may ask you to take liquid medicine as well.)  How do I prepare for my exam? (Other instructions) Please arrive 30 minutes early for your CT.  Once in the department you might be asked to drink water 15-20 minutes prior to your exam.  If indicated you may be asked to drink an oral contrast in advance of your CT.  If this is the case, the imaging team will let you know or be in contact with you prior to your appointment  Patients over 70 or patients with diabetes or kidney problems: If you haven t had a blood test (creatinine test) within the last 30 days, the Cardiologist/Radiologist may require you to get this test prior to your exam.  If you have diabetes:  Continue to take your metformin medication on the day of your exam  What should I wear: Please wear loose clothing, such as a sweat suit or jogging clothes. Avoid snaps, zippers and other metal. We may ask you to undress and put on a hospital gown.  How long does the exam take: Most scans take less than 20 minutes.  What should I bring: Please bring any scans or X-rays  taken at other hospitals, if similar tests were done. Also bring a list of your medicines, including vitamins, minerals and over-the-counter drugs. It is safest to leave personal items at home.  Do I need a : No  is needed.  What do I need to tell my doctor? Be sure to tell your doctor: * If you have any allergies. * If there s any chance you are pregnant. * If you are breastfeeding.  What should I do after the exam: No restrictions, You may resume normal activities.  What is this test: A CT (computed tomography) scan is a series of pictures that allows us to look inside your body. The scanner creates images of the body in cross sections, much like slices of bread. This helps us see any problems more clearly. You may receive contrast (X-ray dye) before or during your scan. You will be asked to drink the contrast.  Who should I call with questions: If you have any questions, please call the Imaging Department where you will have your exam. Directions, parking instructions, and other information is available on our website, [email or web address].            Oct 23, 2018 12:00 PM CDT   (Arrive by 11:45 AM)   Return Visit with Kadi Dee MD   [facility] ([facility])    [address]  [address] [place] [address] [phone]              Who to contact     If you have questions or need follow up information about today's clinic visit or your schedule please contact [facility] directly at [phone].  Normal or non-critical lab and imaging results will be communicated to you by MyChart, letter or phone within 4 business days after the clinic has received the results. If you do not hear from us within 7 days, please contact the clinic through MyChart or phone. If you have a critical or abnormal lab result, we will notify you by phone as soon as possible.  Submit refill requests through [employer] or call your pharmacy and  they will forward the refill request to us. Please allow 3 business days for your refill to be completed.          Additional Information About Your Visit        [employer] Information     [employer] gives you secure access to your electronic health record. If you see a primary care provider, you can also send messages to your care team and make appointments. If you have questions, please call your primary care clinic.  If you do not have a primary care provider, please call [phone] and they will assist you.        Care EveryWhere ID     This is your Care EveryWhere ID. This could be used by other organizations to access your [facility] medical records  [id number]        Your Vitals Were     Pulse Temperature Respirations Pulse Oximetry          87 97.7  F (36.5  C) (Oral) 16 97%         Blood Pressure from Last 3 Encounters:   09/11/18 158/86   09/04/18 142/84   08/28/18 145/88    Weight from Last 3 Encounters:   09/04/18 (!) 172.3 kg (379 lb 12.8 oz)   08/28/18 (!) 168.7 kg (372 lb)   08/17/18 (!) 168 kg (370 lb 6 oz)              Today, you had the following     No orders found for display         Today's Medication Changes          These changes are accurate as of 9/11/18  5:46 PM.  If you have any questions, ask your nurse or doctor.               These medicines have changed or have updated prescriptions.        Dose/Directions    lisinopril 10 MG tablet   Commonly known as:  PRINIVIL/ZESTRIL   This may have changed:  how much to take   Used for:  Other secondary hypertension, Cancer of distal third of esophagus (H)        Dose:  10 mg   Take 1 tablet (10 mg) by mouth daily   Quantity:  30 tablet   Refills:  3                Primary Care Provider Fax #    Physician No Ref-Primary [phone]       No address on file        Equal Access to Services     OLLIE SHARPE : Eliza Dunlap, yeyo davis, geraldo dumont. So [facility]  [phone].    ATENCIÓN: Si alayna rodriguez, tiene a alamo disposición servicios gratuitos de asistencia lingüística. Tanisha anderson [phone].    We comply with applicable federal civil rights laws and [place] laws. We do not discriminate on the basis of race, color, national origin, age, disability, sex, sexual orientation, or gender identity.            Thank you!     Thank you for choosing [facility]  for your care. Our goal is always to provide you with excellent care. Hearing back from our patients is one way we can continue to improve our services. Please take a few minutes to complete the written survey that you may receive in the mail after your visit with us. Thank you!             Your Updated Medication List - Protect others around you: Learn how to safely use, store and throw away your medicines at www.disposemymeds.org.          This list is accurate as of 9/11/18  5:46 PM.  Always use your most recent med list.                   Brand Name Dispense Instructions for use Diagnosis    diphenoxylate-atropine 2.5-0.025 MG per tablet    LOMOTIL    40 tablet    Take 1 tablet by mouth 4 times daily as needed for diarrhea    Diarrhea, unspecified type       * enoxaparin 80 MG/0.8ML injection    LOVENOX    60 Syringe    Inject 1.3 mLs (130 mg) Subcutaneous 2 times daily    Cancer of distal third of esophagus (H)       * enoxaparin 150 MG/ML injection    LOVENOX     INJECT 0.86ML (130MG) TWICE A DAY FOR ACUTE PULMONARY EMBOLISM AND ESOPHAGEAL MALIGNANCY        fish oil-omega-3 fatty acids 1000 MG capsule      Take 2 g by mouth daily        furosemide 20 MG tablet    LASIX    30 tablet    Take 1 tablet (20 mg) by mouth daily    Cancer of distal third of esophagus (H), Generalized edema       gabapentin 300 MG capsule    NEURONTIN    180 capsule    Two tablets in the am and one tablet in the afternoon and evening    Chemotherapy-induced neuropathy (H)       ketamine (KETALAR) 5%-gabapentin  (NEURONTIN) 8%-lidocaine 2.5% 5%-8% Gel topical PLO cream     30 g    Use small amount topically to feet three times daily    Cancer of distal third of esophagus (H), Other secondary hypertension       latanoprost 0.005 % ophthalmic solution    XALATAN    1 Bottle    Place 1 drop into both eyes At Bedtime    Cancer of distal third of esophagus (H)       lidocaine-prilocaine cream    EMLA    30 g    Apply topically as needed for moderate pain    Cancer of distal third of esophagus (H)       lisinopril 10 MG tablet    PRINIVIL/ZESTRIL    30 tablet    Take 1 tablet (10 mg) by mouth daily    Other secondary hypertension, Cancer of distal third of esophagus (H)       * LORazepam 0.5 MG tablet    ATIVAN    30 tablet    Take 1 tablet (0.5 mg) by mouth every 4 hours as needed (Anxiety, Nausea/Vomiting or Sleep)    Cancer of distal third of esophagus (H)       * LORazepam 0.5 MG tablet    ATIVAN    30 tablet    Take 1 tablet (0.5 mg) by mouth every 4 hours as needed (Anxiety, Nausea/Vomiting or Sleep)    Cancer of distal third of esophagus (H)       Multi-vitamin Tabs tablet      Take 1 tablet by mouth daily        omeprazole 40 MG capsule    priLOSEC    90 capsule    Take 1 capsule (40 mg) by mouth daily    Cancer of distal third of esophagus (H)       ondansetron 8 MG tablet    ZOFRAN    30 tablet    Take 1 tablet (8 mg) by mouth every 8 hours as needed (Nausea/Vomiting)    Cancer of distal third of esophagus (H)       prochlorperazine 10 MG tablet    COMPAZINE    30 tablet    Take 1 tablet (10 mg) by mouth every 6 hours as needed (Nausea/Vomiting)    Cancer of distal third of esophagus (H)       tadalafil 5 MG tablet    CIALIS    20 tablet    Take 2 tablets (10 mg) by mouth every 24 hours As needed for sexual activity    Malignant neoplasm of lower third of esophagus (H), Drug-induced erectile dysfunction       timolol 0.5 % ophthalmic solution    TIMOPTIC     Place into both eyes daily    Cancer of distal third of  esophagus (H)       zolpidem 10 MG tablet    AMBIEN    60 tablet    Take 1 tablet (10 mg) by mouth nightly as needed    Metastasis from gastric cancer (H)       * Notice:  This list has 4 medication(s) that are the same as other medications prescribed for you. Read the directions carefully, and ask your doctor or other care provider to review them with you.

## 2018-09-11 NOTE — PATIENT INSTRUCTIONS
Contact Numbers    [facility] Main Line: [phone]    Triage Nurse Line: [phone]      Please call the [facility] Nurse Triage line if you experience a temperature greater than or equal to 100.5, shaking chills, have uncontrolled nausea, vomiting and/or diarrhea, dizziness, shortness of breath, bleeding not relieved with pressure, or if you have any other questions or concerns.     If it is after hours, weekends, or holidays, please call the [phone] and a nurse will be able to triage your call.    If you are having any concerning symptoms or wish to speak to a provider before your next infusion visit, please call your care coordinator or triage them so we can adequately serve you.      If you need to refill your narcotic prescription or other medication, please call triage before your infusion appointment.        September 2018 Sunday Monday Tuesday Wednesday Thursday Friday Saturday                                 1       2     3     4     [facility] LAB DRAW    1:15 PM   (15 min.)    [facility] LAB DRAW   [facility] Lab Draw     CT CHEST/ABDOMEN/PELVIS W    1:40 PM   (20 min.)   UCCT2   [facility] CT     UMP RETURN    3:15 PM   (30 min.)   Kadi Dee MD   [facility] 5     6     7     8       9     10     11     UMP ONC INFUSION 120   12:30 PM   (120 min.)    ONCOLOGY INFUSION   [facility] 12     13     14     15       16     17     18     UMP ONC INFUSION 120   10:30 AM   (120 min.)    ONCOLOGY INFUSION   [facility] 19     20     21     22       23     24     25     26     27     28     29       30                                              October 2018 Sunday Monday Tuesday Wednesday Thursday Friday Saturday        1     2     UMP RETURN   11:15 AM   (30 min.)   Kadi Dee MD   [facility]     UMP ONC INFUSION 120    1:30 PM   (120 min.)    ONCOLOGY INFUSION     [facility] 3     4     5     6       7     8     9     UMP RETURN   11:35 AM   (50 min.)   Katalina Ramirez PA-C   [facility] ONC INFUSION 120    1:30 PM   (120 min.)    ONCOLOGY INFUSION   [facility] 10     11     12     13       14     15     16     17     18     19     20       21     22     23     CT CHEST/ABDOMEN/PELVIS W    9:00 AM   (20 min.)   UCCT2   [facility] CT     [facility] RETURN   11:45 AM   (30 min.)   Kadi Dee MD   [facility] 24     25     26     27       28     29     30     31                              No results found for this or any previous visit (from the past 24 hour(s)).

## 2018-09-12 ENCOUNTER — CARE COORDINATION (OUTPATIENT)
Dept: ONCOLOGY | Facility: CLINIC | Age: 58
End: 2018-09-12

## 2018-09-13 NOTE — PROGRESS NOTES
Received a call from Levi requesting to have his Day 15 Taxol treatments in [place] due to the distance he has to travel to receive treatment at [facility].  Per writers conversation with Dr Dee, she is willing to write orders to be administered at outside infusion center.  Spoke with Layne at [facility] [phone] who agrees to accept orders from Dr Dee. She reports speaking with Levi who refused to establish care with a provider in [place] in order to have someone local who is overseeing his condition when he goes to the infusion area for treatment. Due to not having a relationship with the attending provider, if any questions arise or he does not meet parameters they will contact Dr Dee however if he is having any symptoms he will be referred to the ED for assessment and treatment.   Return call placed to Levi to discuss the  received and that writer will get all records together and send to [place] as requested.

## 2018-09-14 ENCOUNTER — CARE COORDINATION (OUTPATIENT)
Dept: ONCOLOGY | Facility: CLINIC | Age: 58
End: 2018-09-14

## 2018-09-14 NOTE — PATIENT INSTRUCTIONS
Orders    Taxol treatment parameters:    Give treatment if ANC >/= 1500 and Platelets >/= 75,000    If patient does not meet parameters for treatment:    1) Please page Mansi LANCASTER RN OCN at [phone]     ### If you do not receive a response within 30 minutes ###    2) Call [facility] Triage Line at [phone]       Dr Kadi Dee NPI [id number]

## 2018-09-14 NOTE — PROGRESS NOTES
Spoke with Layne Millan RN ([phone]) at [facility] to discuss coordination of D15 Taxol treatment.  Orders, face sheet, last clinic note and last infusion note faxed [phone]

## 2018-09-19 ENCOUNTER — TELEPHONE (OUTPATIENT)
Dept: ONCOLOGY | Facility: CLINIC | Age: 58
End: 2018-09-19

## 2018-09-19 ENCOUNTER — NURSE TRIAGE (OUTPATIENT)
Dept: NURSING | Facility: CLINIC | Age: 58
End: 2018-09-19

## 2018-09-19 DIAGNOSIS — C15.5 CANCER OF DISTAL THIRD OF ESOPHAGUS (H): ICD-10-CM

## 2018-09-19 DIAGNOSIS — I15.8 OTHER SECONDARY HYPERTENSION: ICD-10-CM

## 2018-09-19 NOTE — TELEPHONE ENCOUNTER
" Patient called the clinic earlier and is wondering if they have an answer to his refill question.    Has questions about whether he should be taking 20mg twice a day or 40mg daily of Lisinopril. \"I'm not sure it's even working.\"     Will be out of the medication after tomorrow's dose.     Has refills in [facility] but pharmacy cannot fill as it's too soon, Patient's instructions need to be updated.     BP yesterday was 159/112, today 161/106, declines triage.    Patient states that when he talked with the clinic nurse today \"she was surprised that I'm not on a beta blocker.\"    Is wondering if his chemo medication is making his BP elevated.     Would like a call back to discuss his meds and refill.    Protocol and care advice reviewed.   Caller states understanding of the recommended disposition. Message sent to clinic.   Advised to call back if further questions or concerns.         Reason for Disposition    Caller requesting a NON-URGENT new prescription or refill and triager unable to refill per unit policy    Protocols used: MEDICATION QUESTION CALL-ADULT-      "

## 2018-09-19 NOTE — TELEPHONE ENCOUNTER
"Patient calling back to see if the Lisinopril refill has been completed.    Has questions about whether he should be taking 20mg twice a day or 40mg daily. \"I'm not sure it's even working.\"     Will be out of the medication after tomorrow's dose.     Has refills in [facility] but pharmacy cannot fill as it's too soon, Patient's instructions need to be updated.     BP yesterday was 159/112, today 161/106, declines triage.    Patient states that when he talked with the clinic nurse today \"she was surprised that I'm not on a beta blocker.\"    Is wondering if his chemo medication is making his BP elevated.     Would like a call back to discuss his meds and refill.    Uma Thompson RN/ [facility]      "

## 2018-09-19 NOTE — TELEPHONE ENCOUNTER
Patient states Dr. Dee messaged him 9/18 about increasing his Lisinopril to 40mg instead of 20mg. Wondering if this should be 20mg BID or 40mg daily? I did not see a note about this in his chart or med list. Patient also needs refill sent to pharmacy with updated instructions/dose.    Writer sent InBasket to Dr. Dee and RNBRENDA Wetzel; pending     9/20 Rx sent to pharmacy for 2 tablets (40mg) daily.     Zulema Bruno, FRANCHESKA   [facility]

## 2018-09-20 ENCOUNTER — TELEPHONE (OUTPATIENT)
Dept: ONCOLOGY | Facility: CLINIC | Age: 58
End: 2018-09-20

## 2018-09-20 DIAGNOSIS — I10 ESSENTIAL HYPERTENSION: Primary | ICD-10-CM

## 2018-09-20 RX ORDER — LISINOPRIL 20 MG/1
40 TABLET ORAL DAILY
Qty: 60 TABLET | Refills: 1 | Status: SHIPPED | OUTPATIENT
Start: 2018-09-20 | End: 2018-10-09

## 2018-09-20 RX ORDER — AMLODIPINE BESYLATE 5 MG/1
5 TABLET ORAL DAILY
Qty: 60 TABLET | Refills: 0 | Status: SHIPPED | OUTPATIENT
Start: 2018-09-20 | End: 2018-10-08

## 2018-09-20 NOTE — TELEPHONE ENCOUNTER
Spoke to Dr. Dee and to patient. Script sent to [facility] pharmacy per patient request.    Wendy Chang RN

## 2018-09-20 NOTE — TELEPHONE ENCOUNTER
I spoke with Levi.    We discussed that he is on lisinopril 40 mg daily.  I'd like him to add amlodipine 5 mg daily.    We had a conversation about his overall care.  He is getting tired coming to the [place] regularly for his care, and he would like to move his care to [place].  He has found an oncologist that comes to [place] from [facility].  His name is Dr Davonte Guevara.  [phone].      I will touch base with this provider and update them on Levi's condition and how he is doing.  Levi is going to make an appt with him for future infusions.    Kadi Dee MD        ----- Message from Zulema Bruno RN sent at 9/19/2018 11:23 AM CDT -----  Regarding: Lisinopril- new dosage  Contact: [phone]  Good morning Dr. Caitlin Alexis says you messaged him about increasing his Lisinopril to 40mg instead of 20mg. He's wondering if this should be 20mg BID or 40mg daily? I did not see a note about this in his chart or med list.     Also, can you update Rx in medication list to the correct dose and send refill? Patient will need a refill from pharmacy tomorrow.     Thank you!  Zulema Bruno RN   [facility]

## 2018-09-20 NOTE — TELEPHONE ENCOUNTER
Chemotherapy nurse Karlie from  in [place], [place] calling triage re: hypertension States she just saw patient and he reports not hearing back from our clinic regarding hypertension and change in Lisinopril dosage. States patient is out of pills and in need of a refill but hasn't heard back from our clinic. Per Karlie this conversation was at 1335 this afternoon 9/20.     Writer called patient to verify this issue had been resolved by Wendy Chang at 0920 this morning. Patient confirms he spoke to Wendy and is aware of correct dosage and that refill was sent to pharmacy. Patient states Karlie was confused by what he was saying at his appointment. Pt states he has no further questions at this time, patient will continue to update Dr. Dee with VS.    Zulema Bruno, FRANCHESKA   [facility]

## 2018-09-26 ENCOUNTER — TELEPHONE (OUTPATIENT)
Dept: ONCOLOGY | Facility: CLINIC | Age: 58
End: 2018-09-26

## 2018-09-26 ENCOUNTER — CARE COORDINATION (OUTPATIENT)
Dept: ONCOLOGY | Facility: CLINIC | Age: 58
End: 2018-09-26

## 2018-09-26 DIAGNOSIS — C15.5 CANCER OF DISTAL THIRD OF ESOPHAGUS (H): Primary | ICD-10-CM

## 2018-09-26 NOTE — PROGRESS NOTES
"Faxed standing Lab orders to [facility] [phone] for Day 1 labs (CBC, Hepatic panel, protein qualitative urine).  Per Layne, Levi has not made an appointment with Dr Guevara to transfer care. She reports Levi has told \"them\" that he has not decided if he will be transferring his care and infusions at this time.   He is scheduled to have labs drawn the day prior to having infusion at [facility] and still plans to have his Taxol only infusions there.  "

## 2018-10-01 ENCOUNTER — TRANSFERRED RECORDS (OUTPATIENT)
Dept: HEALTH INFORMATION MANAGEMENT | Facility: CLINIC | Age: 58
End: 2018-10-01

## 2018-10-01 LAB
ALBUMIN SERPL-MCNC: 3.9 G/DL
ALP SERPL-CCNC: 70 U/L
ALT SERPL-CCNC: 19 U/L
AST SERPL-CCNC: 21 U/L
BASOPHILS NFR BLD AUTO: ABNORMAL %
BILIRUB SERPL-MCNC: 0.6 MG/DL
BILIRUBIN DIRECT: 0.2 MG/DL
CREATINE URINE: 162.2
EOSINOPHIL NFR BLD AUTO: ABNORMAL %
ERYTHROCYTE [DISTWIDTH] IN BLOOD BY AUTOMATED COUNT: 17.2 %
HCT VFR BLD AUTO: 37.1 %
HEMOGLOBIN: 12 G/DL
LYMPHOCYTES NFR BLD AUTO: ABNORMAL %
MCH RBC QN AUTO: 29.5 PG
MCHC RBC AUTO-ENTMCNC: 32.3 G/DL
MCV RBC AUTO: 91.2 FL
MONOCYTES NFR BLD AUTO: ABNORMAL %
NEUTROPHILS # BLD AUTO: 1.8 10*3/UL
NEUTROPHILS NFR BLD AUTO: 61.9 %
OTHER: 0.28 %
PLATELET COUNT - QUEST: 159 10^9/L (ref 150–450)
PMV BLD: 10.2 FL
PROTEIN, TOTAL, RANDOM URINE - QUEST: 45.5
RBC # BLD AUTO: 4.07 10^12/L
TOTAL PROTEIN - QUEST: 7
WBC # BLD AUTO: 3 10^9/L

## 2018-10-02 ENCOUNTER — INFUSION THERAPY VISIT (OUTPATIENT)
Dept: ONCOLOGY | Facility: CLINIC | Age: 58
End: 2018-10-02
Attending: INTERNAL MEDICINE
Payer: COMMERCIAL

## 2018-10-02 ENCOUNTER — CARE COORDINATION (OUTPATIENT)
Dept: ONCOLOGY | Facility: CLINIC | Age: 58
End: 2018-10-02

## 2018-10-02 ENCOUNTER — HOSPITAL ENCOUNTER (OUTPATIENT)
Facility: AMBULATORY SURGERY CENTER | Age: 58
End: 2018-10-02
Attending: INTERNAL MEDICINE
Payer: COMMERCIAL

## 2018-10-02 ENCOUNTER — ONCOLOGY VISIT (OUTPATIENT)
Dept: ONCOLOGY | Facility: CLINIC | Age: 58
End: 2018-10-02
Attending: NURSE PRACTITIONER
Payer: COMMERCIAL

## 2018-10-02 VITALS
DIASTOLIC BLOOD PRESSURE: 87 MMHG | WEIGHT: 315 LBS | HEART RATE: 93 BPM | RESPIRATION RATE: 16 BRPM | TEMPERATURE: 97.6 F | SYSTOLIC BLOOD PRESSURE: 139 MMHG | OXYGEN SATURATION: 98 % | HEIGHT: 78 IN | BODY MASS INDEX: 36.45 KG/M2

## 2018-10-02 DIAGNOSIS — R13.19 OTHER DYSPHAGIA: Primary | ICD-10-CM

## 2018-10-02 DIAGNOSIS — C15.5 CANCER OF DISTAL THIRD OF ESOPHAGUS (H): ICD-10-CM

## 2018-10-02 DIAGNOSIS — C15.5 CANCER OF DISTAL THIRD OF ESOPHAGUS (H): Primary | ICD-10-CM

## 2018-10-02 PROCEDURE — 99214 OFFICE O/P EST MOD 30 MIN: CPT | Mod: ZP | Performed by: INTERNAL MEDICINE

## 2018-10-02 PROCEDURE — G0463 HOSPITAL OUTPT CLINIC VISIT: HCPCS | Mod: ZF

## 2018-10-02 PROCEDURE — 96367 TX/PROPH/DG ADDL SEQ IV INF: CPT

## 2018-10-02 PROCEDURE — 25000132 ZZH RX MED GY IP 250 OP 250 PS 637: Mod: ZF | Performed by: INTERNAL MEDICINE

## 2018-10-02 PROCEDURE — 25000128 H RX IP 250 OP 636: Mod: ZF | Performed by: INTERNAL MEDICINE

## 2018-10-02 PROCEDURE — 96413 CHEMO IV INFUSION 1 HR: CPT

## 2018-10-02 PROCEDURE — 96417 CHEMO IV INFUS EACH ADDL SEQ: CPT

## 2018-10-02 PROCEDURE — 90686 IIV4 VACC NO PRSV 0.5 ML IM: CPT | Mod: ZF | Performed by: INTERNAL MEDICINE

## 2018-10-02 PROCEDURE — G0008 ADMIN INFLUENZA VIRUS VAC: HCPCS

## 2018-10-02 RX ORDER — ALBUTEROL SULFATE 0.83 MG/ML
2.5 SOLUTION RESPIRATORY (INHALATION)
Status: CANCELLED | OUTPATIENT
Start: 2018-10-09

## 2018-10-02 RX ORDER — HEPARIN SODIUM (PORCINE) LOCK FLUSH IV SOLN 100 UNIT/ML 100 UNIT/ML
500 SOLUTION INTRAVENOUS EVERY 8 HOURS
Status: CANCELLED | OUTPATIENT
Start: 2018-10-02

## 2018-10-02 RX ORDER — DIPHENHYDRAMINE HCL 25 MG
50 CAPSULE ORAL ONCE
Status: CANCELLED | OUTPATIENT
Start: 2018-10-02

## 2018-10-02 RX ORDER — MEPERIDINE HYDROCHLORIDE 25 MG/ML
25 INJECTION INTRAMUSCULAR; INTRAVENOUS; SUBCUTANEOUS EVERY 30 MIN PRN
Status: CANCELLED | OUTPATIENT
Start: 2018-10-09

## 2018-10-02 RX ORDER — ALBUTEROL SULFATE 90 UG/1
1-2 AEROSOL, METERED RESPIRATORY (INHALATION)
Status: CANCELLED
Start: 2018-10-09

## 2018-10-02 RX ORDER — SODIUM CHLORIDE 9 MG/ML
1000 INJECTION, SOLUTION INTRAVENOUS CONTINUOUS PRN
Status: CANCELLED
Start: 2018-10-09

## 2018-10-02 RX ORDER — DIPHENHYDRAMINE HCL 25 MG
50 CAPSULE ORAL ONCE
Status: COMPLETED | OUTPATIENT
Start: 2018-10-02 | End: 2018-10-02

## 2018-10-02 RX ORDER — LORAZEPAM 2 MG/ML
0.5 INJECTION INTRAMUSCULAR EVERY 4 HOURS PRN
Status: CANCELLED
Start: 2018-10-09

## 2018-10-02 RX ORDER — MEPERIDINE HYDROCHLORIDE 25 MG/ML
25 INJECTION INTRAMUSCULAR; INTRAVENOUS; SUBCUTANEOUS EVERY 30 MIN PRN
Status: CANCELLED | OUTPATIENT
Start: 2018-10-02

## 2018-10-02 RX ORDER — HEPARIN SODIUM (PORCINE) LOCK FLUSH IV SOLN 100 UNIT/ML 100 UNIT/ML
500 SOLUTION INTRAVENOUS EVERY 8 HOURS
Status: DISCONTINUED | OUTPATIENT
Start: 2018-10-02 | End: 2018-10-02 | Stop reason: HOSPADM

## 2018-10-02 RX ORDER — LISINOPRIL 40 MG/1
40 TABLET ORAL DAILY
Refills: 1 | COMMUNITY
Start: 2018-09-20 | End: 2018-10-31

## 2018-10-02 RX ORDER — METHYLPREDNISOLONE SODIUM SUCCINATE 125 MG/2ML
125 INJECTION, POWDER, LYOPHILIZED, FOR SOLUTION INTRAMUSCULAR; INTRAVENOUS
Status: CANCELLED
Start: 2018-10-09

## 2018-10-02 RX ORDER — LORAZEPAM 2 MG/ML
0.5 INJECTION INTRAMUSCULAR EVERY 4 HOURS PRN
Status: CANCELLED
Start: 2018-10-02

## 2018-10-02 RX ORDER — EPINEPHRINE 1 MG/ML
0.3 INJECTION, SOLUTION INTRAMUSCULAR; SUBCUTANEOUS EVERY 5 MIN PRN
Status: CANCELLED | OUTPATIENT
Start: 2018-10-09

## 2018-10-02 RX ORDER — DIPHENHYDRAMINE HCL 25 MG
50 CAPSULE ORAL ONCE
Status: CANCELLED
Start: 2018-10-09

## 2018-10-02 RX ORDER — ALBUTEROL SULFATE 0.83 MG/ML
2.5 SOLUTION RESPIRATORY (INHALATION)
Status: CANCELLED | OUTPATIENT
Start: 2018-10-02

## 2018-10-02 RX ORDER — ALBUTEROL SULFATE 90 UG/1
1-2 AEROSOL, METERED RESPIRATORY (INHALATION)
Status: CANCELLED
Start: 2018-10-02

## 2018-10-02 RX ORDER — EPINEPHRINE 0.3 MG/.3ML
0.3 INJECTION SUBCUTANEOUS EVERY 5 MIN PRN
Status: CANCELLED | OUTPATIENT
Start: 2018-10-02

## 2018-10-02 RX ORDER — DIPHENHYDRAMINE HYDROCHLORIDE 50 MG/ML
50 INJECTION INTRAMUSCULAR; INTRAVENOUS
Status: CANCELLED
Start: 2018-10-09

## 2018-10-02 RX ORDER — EPINEPHRINE 0.3 MG/.3ML
0.3 INJECTION SUBCUTANEOUS EVERY 5 MIN PRN
Status: CANCELLED | OUTPATIENT
Start: 2018-10-09

## 2018-10-02 RX ORDER — HEPARIN SODIUM (PORCINE) LOCK FLUSH IV SOLN 100 UNIT/ML 100 UNIT/ML
500 SOLUTION INTRAVENOUS EVERY 8 HOURS
Status: CANCELLED
Start: 2018-10-09

## 2018-10-02 RX ORDER — EPINEPHRINE 1 MG/ML
0.3 INJECTION, SOLUTION INTRAMUSCULAR; SUBCUTANEOUS EVERY 5 MIN PRN
Status: CANCELLED | OUTPATIENT
Start: 2018-10-02

## 2018-10-02 RX ORDER — SODIUM CHLORIDE 9 MG/ML
1000 INJECTION, SOLUTION INTRAVENOUS CONTINUOUS PRN
Status: CANCELLED
Start: 2018-10-02

## 2018-10-02 RX ORDER — DIPHENHYDRAMINE HYDROCHLORIDE 50 MG/ML
50 INJECTION INTRAMUSCULAR; INTRAVENOUS
Status: CANCELLED
Start: 2018-10-02

## 2018-10-02 RX ORDER — METHYLPREDNISOLONE SODIUM SUCCINATE 125 MG/2ML
125 INJECTION, POWDER, LYOPHILIZED, FOR SOLUTION INTRAMUSCULAR; INTRAVENOUS
Status: CANCELLED
Start: 2018-10-02

## 2018-10-02 RX ADMIN — SODIUM CHLORIDE 1300 MG: 9 INJECTION, SOLUTION INTRAVENOUS at 13:48

## 2018-10-02 RX ADMIN — SODIUM CHLORIDE 250 ML: 9 INJECTION, SOLUTION INTRAVENOUS at 13:03

## 2018-10-02 RX ADMIN — FAMOTIDINE 20 MG: 20 INJECTION, SOLUTION INTRAVENOUS at 13:03

## 2018-10-02 RX ADMIN — PACLITAXEL 240 MG: 6 INJECTION, SOLUTION INTRAVENOUS at 14:59

## 2018-10-02 RX ADMIN — DIPHENHYDRAMINE HYDROCHLORIDE 50 MG: 25 CAPSULE ORAL at 13:02

## 2018-10-02 RX ADMIN — INFLUENZA A VIRUS A/MICHIGAN/45/2015 X-275 (H1N1) ANTIGEN (FORMALDEHYDE INACTIVATED), INFLUENZA A VIRUS A/SINGAPORE/INFIMH-16-0019/2016 IVR-186 (H3N2) ANTIGEN (FORMALDEHYDE INACTIVATED), INFLUENZA B VIRUS B/PHUKET/3073/2013 ANTIGEN (FORMALDEHYDE INACTIVATED), AND INFLUENZA B VIRUS B/MARYLAND/15/2016 BX-69A ANTIGEN (FORMALDEHYDE INACTIVATED) 0.5 ML: 15; 15; 15; 15 INJECTION, SUSPENSION INTRAMUSCULAR at 14:38

## 2018-10-02 RX ADMIN — Medication 500 UNITS: at 16:03

## 2018-10-02 RX ADMIN — DEXAMETHASONE SODIUM PHOSPHATE 20 MG: 10 INJECTION, SOLUTION INTRAMUSCULAR; INTRAVENOUS at 13:26

## 2018-10-02 ASSESSMENT — PAIN SCALES - GENERAL: PAINLEVEL: NO PAIN (0)

## 2018-10-02 NOTE — PATIENT INSTRUCTIONS
Contact Numbers    [facility] Main Line: [phone]  [facility] Triage and after hours / weekends / holidays:  [phone]      Please call the triage or after hours line if you experience a temperature greater than or equal to 100.5, shaking chills, have uncontrolled nausea, vomiting and/or diarrhea, dizziness, shortness of breath, chest pain, bleeding, unexplained bruising, or if you have any other new/concerning symptoms, questions or concerns.      If you are having any concerning symptoms or wish to speak to a provider before your next infusion visit, please call your care coordinator or triage to notify them so we can adequately serve you.     If you need a refill on a narcotic prescription or other medication, please call before your infusion appointment.                   October 2018 Sunday Monday Tuesday Wednesday Thursday Friday Saturday        1     2     [facility] RETURN   11:15 AM   (30 min.)   Kadi Dee MD   [facility] ONC INFUSION 120    1:30 PM   (120 min.)    ONCOLOGY INFUSION   [facility] 3     4     5     6       7     8     9     10     COMBINED ESOPHAGOSCOPY, GASTROSCOPY, DUODENOSCOPY (EGD)    3:15 PM   Jacquelin Soria MD    OR     Outpatient Visit    3:15 PM   [facility] 11     12     13       14     15     16     17     18     19     20       21     22     23     CT CHEST/ABDOMEN/PELVIS W    9:00 AM   (20 min.)   UCCT2   [facility] CT     [facility] DRAW   11:30 AM   (15 min.)   [facility]   [facility] Lab Draw     [facility] RETURN   11:45 AM   (30 min.)   Kadi Dee MD   [facility] ONC INFUSION 120    1:30 PM   (120 min.)    ONCOLOGY INFUSION   [facility] 24     25     26     27       28     29     30     31                               November 2018 Sunday Monday Tuesday Wednesday Thursday Friday Saturday                        1     2     3       4     5     6     7     8     9     10       11     12     13     14     15     16     17       18     19     20     21     22     23     24       25     26     27     28     29     30                       Lab Results:  No results found for this or any previous visit (from the past 12 hour(s)).

## 2018-10-02 NOTE — MR AVS SNAPSHOT
After Visit Summary   10/2/2018    Reuben Padilla    MRN: [id number]           Patient Information     Date Of Birth          1960        Visit Information        Provider Department      10/2/2018 11:30 AM Kadi Dee MD [facility]        Today's Diagnoses     Other dysphagia    -  1       Follow-ups after your visit        Additional Services     GASTROENTEROLOGY ADULT REF PROCEDURE ONLY       Last Lab Result: Creatinine (mg/dL)       Date                     Value                 07/24/2018               0.91             ----------  Body mass index is 41.28 kg/(m^2).     Needed:  No  Language:  English    Patient will be contacted to schedule procedure.     Please be aware that coverage of these services is subject to the terms and limitations of your health insurance plan.  Call member services at your health plan with any benefit or coverage questions.  Any procedures must be performed at a [facility] facility OR coordinated by your clinic's referral office.    Please bring the following with you to your appointment:    (1) Any X-Rays, CTs or MRIs which have been performed.  Contact the facility where they were done to arrange for  prior to your scheduled appointment.    (2) List of current medications   (3) This referral request   (4) Any documents/labs given to you for this referral                  Your next 10 appointments already scheduled     Oct 10, 2018   Procedure with Jacquelin Soria MD   [facility] ([facility])    [address] [place], [place] [id number] [phone]           Located in the Clinics and Surgery Center at [address] [place] [address].   parking is very convenient and highly recommended.  is a $6 flat rate fee.  Both  and self parkers should enter the main arrival plaza from [facility]; parking attendants will direct you  based on your parking preference.            Oct 23, 2018  9:00 AM CDT   CT CHEST/ABDOMEN/PELVIS W CONTRAST with UCCT2   [facility] CT ([facility])    [address]  1st Floor  [facility] [address] [phone]           How do I prepare for my exam? (Food and drink instructions) To prepare: Do not eat or drink for 2 hours before your exam. If you need to take medicine, you may take it with small sips of water. (We may ask you to take liquid medicine as well.)  How do I prepare for my exam? (Other instructions) Please arrive 30 minutes early for your CT.  Once in the department you might be asked to drink water 15-20 minutes prior to your exam.  If indicated you may be asked to drink an oral contrast in advance of your CT.  If this is the case, the imaging team will let you know or be in contact with you prior to your appointment  Patients over 70 or patients with diabetes or kidney problems: If you haven t had a blood test (creatinine test) within the last 30 days, the Cardiologist/Radiologist may require you to get this test prior to your exam.  If you have diabetes:  Continue to take your metformin medication on the day of your exam  What should I wear: Please wear loose clothing, such as a sweat suit or jogging clothes. Avoid snaps, zippers and other metal. We may ask you to undress and put on a hospital gown.  How long does the exam take: Most scans take less than 20 minutes.  What should I bring: Please bring any scans or X-rays taken at other hospitals, if similar tests were done. Also bring a list of your medicines, including vitamins, minerals and over-the-counter drugs. It is safest to leave personal items at home.  Do I need a : No  is needed.  What do I need to tell my doctor? Be sure to tell your doctor: * If you have any allergies. * If there s any chance you are pregnant. * If you are breastfeeding.  What should I do after the exam: No  restrictions, You may resume normal activities.  What is this test: A CT (computed tomography) scan is a series of pictures that allows us to look inside your body. The scanner creates images of the body in cross sections, much like slices of bread. This helps us see any problems more clearly. You may receive contrast (X-ray dye) before or during your scan. You will be asked to drink the contrast.  Who should I call with questions: If you have any questions, please call the Imaging Department where you will have your exam. Directions, parking instructions, and other information is available on our website, [email or web address].            Oct 23, 2018 11:30 AM CDT   [facility] Draw with  [facility] DRAW   [facility] Lab Draw ([facility])    [address] [place] [address]  [address] [place] [id number] [phone]            Oct 23, 2018 12:00 PM CDT   (Arrive by 11:45 AM)   Return Visit with Kadi Dee MD   [facility] ([facility])    [address] [place] [address]  [address]  [facility] [id number] [phone]            Oct 23, 2018  1:30 PM CDT   Infusion 120 with  ONCOLOGY INFUSION, UC 22 ATC   [facility] ([facility])    [address] [place] [address]  [address] [place] [id number] [phone]              Who to contact     If you have questions or need follow up information about today's clinic visit or your schedule please contact [facility] directly at [phone].  Normal or non-critical lab and imaging results will be communicated to you by MyChart, letter or phone within 4 business days after the clinic has received the results. If you do not hear from us within 7 days, please contact the clinic through MyChart or phone. If you have a critical or abnormal lab result, we will notify you by phone as soon as possible.  Submit refill requests through [employer] or  "call your pharmacy and they will forward the refill request to us. Please allow 3 business days for your refill to be completed.          Additional Information About Your Visit        [employer] Information     [employer]x gives you secure access to your electronic health record. If you see a primary care provider, you can also send messages to your care team and make appointments. If you have questions, please call your primary care clinic.  If you do not have a primary care provider, please call [phone] and they will assist you.        Care EveryWhere ID     This is your Care EveryWhere ID. This could be used by other organizations to access your [place] medical records  [id number]        Your Vitals Were     Pulse Temperature Respirations Height Pulse Oximetry BMI (Body Mass Index)    93 97.6  F (36.4  C) (Oral) 16 1.981 m (6' 6\") 98% 41.28 kg/m2       Blood Pressure from Last 3 Encounters:   10/02/18 139/87   09/11/18 158/86   09/04/18 142/84    Weight from Last 3 Encounters:   10/02/18 (!) 162 kg (357 lb 4 oz)   09/04/18 (!) 172.3 kg (379 lb 12.8 oz)   08/28/18 (!) 168.7 kg (372 lb)              We Performed the Following     GASTROENTEROLOGY ADULT REF PROCEDURE ONLY          Today's Medication Changes          These changes are accurate as of 10/2/18 11:59 PM.  If you have any questions, ask your nurse or doctor.               These medicines have changed or have updated prescriptions.        Dose/Directions    amLODIPine 5 MG tablet   Commonly known as:  NORVASC   This may have changed:  how much to take   Used for:  Essential hypertension        Dose:  5 mg   Take 1 tablet (5 mg) by mouth daily   Quantity:  60 tablet   Refills:  0                Primary Care Provider Fax #    Physician No Ref-Primary [phone]       No address on file        Equal Access to Services     OLLIE SHARPE AH: Eliza Dunlap, yeyo davis, nancy sotelo, geraldo cardenas. " So [facility] [phone].    ATENCIÓN: Si angelikala jennifer, tiene a alamo disposición servicios gratuitos de asistencia lingüística. Tanisha anderson [phone].    We comply with applicable federal civil rights laws and [place] laws. We do not discriminate on the basis of race, color, national origin, age, disability, sex, sexual orientation, or gender identity.            Thank you!     Thank you for choosing [facility]  for your care. Our goal is always to provide you with excellent care. Hearing back from our patients is one way we can continue to improve our services. Please take a few minutes to complete the written survey that you may receive in the mail after your visit with us. Thank you!             Your Updated Medication List - Protect others around you: Learn how to safely use, store and throw away your medicines at www.disposemymeds.org.          This list is accurate as of 10/2/18 11:59 PM.  Always use your most recent med list.                   Brand Name Dispense Instructions for use Diagnosis    amLODIPine 5 MG tablet    NORVASC    60 tablet    Take 1 tablet (5 mg) by mouth daily    Essential hypertension       diphenoxylate-atropine 2.5-0.025 MG per tablet    LOMOTIL    40 tablet    Take 1 tablet by mouth 4 times daily as needed for diarrhea    Diarrhea, unspecified type       * enoxaparin 80 MG/0.8ML injection    LOVENOX    60 Syringe    Inject 1.3 mLs (130 mg) Subcutaneous 2 times daily    Cancer of distal third of esophagus (H)       * enoxaparin 150 MG/ML injection    LOVENOX    60 mL    INJECT 0.86ML (130MG) TWICE A DAY FOR ACUTE PULMONARY EMBOLISM AND ESOPHAGEAL MALIGNANCY    Hx of pulmonary embolus       fish oil-omega-3 fatty acids 1000 MG capsule      Take 2 g by mouth daily        furosemide 20 MG tablet    LASIX    30 tablet    Take 1 tablet (20 mg) by mouth daily    Cancer of distal third of esophagus (H), Generalized edema       gabapentin 300 MG capsule    NEURONTIN    180  capsule    Two tablets in the am and one tablet in the afternoon and evening    Chemotherapy-induced neuropathy (H)       ketamine (KETALAR) 5%-gabapentin (NEURONTIN) 8%-lidocaine 2.5% 5%-8% Gel topical PLO cream     30 g    Use small amount topically to feet three times daily    Cancer of distal third of esophagus (H), Other secondary hypertension       latanoprost 0.005 % ophthalmic solution    XALATAN    1 Bottle    Place 1 drop into both eyes At Bedtime    Cancer of distal third of esophagus (H)       lidocaine-prilocaine cream    EMLA    30 g    Apply topically as needed for moderate pain    Cancer of distal third of esophagus (H)       * lisinopril 10 MG tablet    PRINIVIL/ZESTRIL    30 tablet    Take 1 tablet (10 mg) by mouth daily    Other secondary hypertension, Cancer of distal third of esophagus (H)       * lisinopril 20 MG tablet    PRINIVIL/ZESTRIL    60 tablet    Take 2 tablets (40 mg) by mouth daily    Other secondary hypertension       * lisinopril 40 MG tablet    PRINIVIL/ZESTRIL     Take 40 mg by mouth daily        * LORazepam 0.5 MG tablet    ATIVAN    30 tablet    Take 1 tablet (0.5 mg) by mouth every 4 hours as needed (Anxiety, Nausea/Vomiting or Sleep)    Cancer of distal third of esophagus (H)       * LORazepam 0.5 MG tablet    ATIVAN    30 tablet    Take 1 tablet (0.5 mg) by mouth every 4 hours as needed (Anxiety, Nausea/Vomiting or Sleep)    Cancer of distal third of esophagus (H)       Multi-vitamin Tabs tablet      Take 1 tablet by mouth daily        omeprazole 40 MG capsule    priLOSEC    90 capsule    Take 1 capsule (40 mg) by mouth daily    Cancer of distal third of esophagus (H)       ondansetron 8 MG tablet    ZOFRAN    30 tablet    Take 1 tablet (8 mg) by mouth every 8 hours as needed (Nausea/Vomiting)    Cancer of distal third of esophagus (H)       prochlorperazine 10 MG tablet    COMPAZINE    30 tablet    Take 1 tablet (10 mg) by mouth every 6 hours as needed (Nausea/Vomiting)     Cancer of distal third of esophagus (H)       tadalafil 5 MG tablet    CIALIS    20 tablet    Take 2 tablets (10 mg) by mouth every 24 hours As needed for sexual activity    Malignant neoplasm of lower third of esophagus (H), Drug-induced erectile dysfunction       timolol 0.5 % ophthalmic solution    TIMOPTIC     Place into both eyes daily    Cancer of distal third of esophagus (H)       zolpidem 10 MG tablet    AMBIEN    60 tablet    Take 1 tablet (10 mg) by mouth nightly as needed    Metastasis from gastric cancer (H)       * Notice:  This list has 7 medication(s) that are the same as other medications prescribed for you. Read the directions carefully, and ask your doctor or other care provider to review them with you.

## 2018-10-02 NOTE — MR AVS SNAPSHOT
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      If you are having any concerning symptoms or wish to speak to a provider before your next infusion visit, please call your care coordinator or triage to notify them so we can adequately serve you.     If you need a refill on a narcotic prescription or other medication, please call before your infusion appointment.                   October 2018 Sunday Monday Tuesday Wednesday Thursday Friday Saturday        1     2     [facility] RETURN   11:15 AM   (30 min.)   Kadi Dee MD   [facility] ONC INFUSION 120    1:30 PM   (120 min.)   UC ONCOLOGY INFUSION   [facility] 3     4     5     6       7     8     9     10     COMBINED ESOPHAGOSCOPY, GASTROSCOPY, DUODENOSCOPY (EGD)    3:15 PM   Jacquelin Soria MD    OR     Outpatient Visit    3:15 PM   [facility] 11     12     13       14     15     16     17     18     19     20       21     22     23     CT CHEST/ABDOMEN/PELVIS W    9:00 AM   (20 min.)   UCCT2   [facility] CT     [facility]   11:30 AM   (15 min.)   [facility] LAB DRAW   [facility]  Lab Draw     [facility] RETURN   11:45 AM   (30 min.)   Kadi Dee MD   [facility]     UM ONC INFUSION 120    1:30 PM   (120 min.)   UC ONCOLOGY INFUSION   [facility] 24     25     26     27       28     29     30     31 November 2018 Sunday Monday Tuesday Wednesday Thursday Friday Saturday                       1     2     3       4     5     6     7     8     9     10       11     12     13     14     15     16     17       18     19     20     21     22     23     24       25     26     27     28     29     30                       Lab Results:  No results found for this or any previous visit (from the past 12 hour(s)).            Follow-ups after your visit        Your next 10 appointments already scheduled     Oct 10, 2018   Procedure with Jacquelin Soria MD   [facility] ([facility])    [address] [place] [address]  5th [facility] [id number] [phone]           Located in the Clinics and Surgery Center at [address] [place] [address].   parking is very convenient and highly recommended.  is a $6 flat rate fee.  Both  and self parkers should enter the main arrival plaza from [facility]; parking attendants will direct you based on your parking preference.            Oct 23, 2018  9:00 AM CDT   CT CHEST/ABDOMEN/PELVIS W CONTRAST with UCCT2   [facility] CT ([facility])    [address] [place] [address]  1st Floor  [facility] [id number] [phone]           How do I prepare for my exam? (Food and drink instructions) To prepare: Do not eat or drink for 2 hours before your exam. If you need to take medicine, you may take it with small sips of water. (We may ask you to take liquid medicine as well.)  How do I prepare for my exam? (Other instructions) Please arrive 30 minutes early for your CT.  Once in the department you  might be asked to drink water 15-20 minutes prior to your exam.  If indicated you may be asked to drink an oral contrast in advance of your CT.  If this is the case, the imaging team will let you know or be in contact with you prior to your appointment  Patients over 70 or patients with diabetes or kidney problems: If you haven t had a blood test (creatinine test) within the last 30 days, the Cardiologist/Radiologist may require you to get this test prior to your exam.  If you have diabetes:  Continue to take your metformin medication on the day of your exam  What should I wear: Please wear loose clothing, such as a sweat suit or jogging clothes. Avoid snaps, zippers and other metal. We may ask you to undress and put on a hospital gown.  How long does the exam take: Most scans take less than 20 minutes.  What should I bring: Please bring any scans or X-rays taken at other hospitals, if similar tests were done. Also bring a list of your medicines, including vitamins, minerals and over-the-counter drugs. It is safest to leave personal items at home.  Do I need a : No  is needed.  What do I need to tell my doctor? Be sure to tell your doctor: * If you have any allergies. * If there s any chance you are pregnant. * If you are breastfeeding.  What should I do after the exam: No restrictions, You may resume normal activities.  What is this test: A CT (computed tomography) scan is a series of pictures that allows us to look inside your body. The scanner creates images of the body in cross sections, much like slices of bread. This helps us see any problems more clearly. You may receive contrast (X-ray dye) before or during your scan. You will be asked to drink the contrast.  Who should I call with questions: If you have any questions, please call the Imaging Department where you will have your exam. Directions, parking instructions, and other information is available on our website, [email or web address].             Oct 23, 2018 11:30 AM CDT   [facility] Draw with  [facility] LAB DRAW   [facility] Lab Draw ([facility])    [address]  [address]  [facility] [address] [phone]            Oct 23, 2018 12:00 PM CDT   (Arrive by 11:45 AM)   Return Visit with Kadi Dee MD   [facility] ([facility])    [address]  [address]  [facility] [address] [phone]            Oct 23, 2018  1:30 PM CDT   Infusion 120 with UC ONCOLOGY INFUSION, UC 22 ATC   [facility] ([facility])    [address]  [address]  [facility] [address] [phone]              Who to contact     If you have questions or need follow up information about today's clinic visit or your schedule please contact [facility] directly at [phone].  Normal or non-critical lab and imaging results will be communicated to you by Zappedyhart, letter or phone within 4 business days after the clinic has received the results. If you do not hear from us within 7 days, please contact the clinic through Zappedyhart or phone. If you have a critical or abnormal lab result, we will notify you by phone as soon as possible.  Submit refill requests through Can[facility] Calendar or call your pharmacy and they will forward the refill request to us. Please allow 3 business days for your refill to be completed.          Additional Information About Your Visit        Canary Calendar Information     Can[facility] Calendar gives you secure access to your electronic health record. If you see a primary care provider, you can also send messages to your care team and make appointments. If you have questions, please call your primary care clinic.  If you do not have a primary care provider, please call [phone] and they will assist you.        Care EveryWhere ID     This is your Care EveryWhere ID. This could be used by other organizations to  access your [place] medical records  [id number]         Blood Pressure from Last 3 Encounters:   10/02/18 139/87   09/11/18 158/86   09/04/18 142/84    Weight from Last 3 Encounters:   10/02/18 (!) 162 kg (357 lb 4 oz)   09/04/18 (!) 172.3 kg (379 lb 12.8 oz)   08/28/18 (!) 168.7 kg (372 lb)              Today, you had the following     No orders found for display         Today's Medication Changes          These changes are accurate as of 10/2/18  3:58 PM.  If you have any questions, ask your nurse or doctor.               These medicines have changed or have updated prescriptions.        Dose/Directions    amLODIPine 5 MG tablet   Commonly known as:  NORVASC   This may have changed:  how much to take   Used for:  Essential hypertension        Dose:  5 mg   Take 1 tablet (5 mg) by mouth daily   Quantity:  60 tablet   Refills:  0                Primary Care Provider Fax #    Physician No Ref-Primary [phone]       No address on file        Equal Access to Services     OLLIE SHARPE : Hadshahana navarroo Sonigel, waaxda luqadaha, qaybta kaalmada adelaurynyajuancarlos, geraldo [place] . So [facility] [phone].    ATENCIÓN: Si habla español, tiene a alamo disposición servicios gratuitos de asistencia lingüística. Llame al [phone].    We comply with applicable federal civil rights laws and [place] laws. We do not discriminate on the basis of race, color, national origin, age, disability, sex, sexual orientation, or gender identity.            Thank you!     Thank you for choosing [facility]  for your care. Our goal is always to provide you with excellent care. Hearing back from our patients is one way we can continue to improve our services. Please take a few minutes to complete the written survey that you may receive in the mail after your visit with us. Thank you!             Your Updated Medication List - Protect others around you: Learn how to safely use, store and throw  away your medicines at www.disposemymeds.org.          This list is accurate as of 10/2/18  3:58 PM.  Always use your most recent med list.                   Brand Name Dispense Instructions for use Diagnosis    amLODIPine 5 MG tablet    NORVASC    60 tablet    Take 1 tablet (5 mg) by mouth daily    Essential hypertension       diphenoxylate-atropine 2.5-0.025 MG per tablet    LOMOTIL    40 tablet    Take 1 tablet by mouth 4 times daily as needed for diarrhea    Diarrhea, unspecified type       * enoxaparin 80 MG/0.8ML injection    LOVENOX    60 Syringe    Inject 1.3 mLs (130 mg) Subcutaneous 2 times daily    Cancer of distal third of esophagus (H)       * enoxaparin 150 MG/ML injection    LOVENOX    60 mL    INJECT 0.86ML (130MG) TWICE A DAY FOR ACUTE PULMONARY EMBOLISM AND ESOPHAGEAL MALIGNANCY    Hx of pulmonary embolus       fish oil-omega-3 fatty acids 1000 MG capsule      Take 2 g by mouth daily        furosemide 20 MG tablet    LASIX    30 tablet    Take 1 tablet (20 mg) by mouth daily    Cancer of distal third of esophagus (H), Generalized edema       gabapentin 300 MG capsule    NEURONTIN    180 capsule    Two tablets in the am and one tablet in the afternoon and evening    Chemotherapy-induced neuropathy (H)       ketamine (KETALAR) 5%-gabapentin (NEURONTIN) 8%-lidocaine 2.5% 5%-8% Gel topical PLO cream     30 g    Use small amount topically to feet three times daily    Cancer of distal third of esophagus (H), Other secondary hypertension       latanoprost 0.005 % ophthalmic solution    XALATAN    1 Bottle    Place 1 drop into both eyes At Bedtime    Cancer of distal third of esophagus (H)       lidocaine-prilocaine cream    EMLA    30 g    Apply topically as needed for moderate pain    Cancer of distal third of esophagus (H)       * lisinopril 10 MG tablet    PRINIVIL/ZESTRIL    30 tablet    Take 1 tablet (10 mg) by mouth daily    Other secondary hypertension, Cancer of distal third of esophagus (H)        * lisinopril 20 MG tablet    PRINIVIL/ZESTRIL    60 tablet    Take 2 tablets (40 mg) by mouth daily    Other secondary hypertension       * lisinopril 40 MG tablet    PRINIVIL/ZESTRIL     Take 40 mg by mouth daily        * LORazepam 0.5 MG tablet    ATIVAN    30 tablet    Take 1 tablet (0.5 mg) by mouth every 4 hours as needed (Anxiety, Nausea/Vomiting or Sleep)    Cancer of distal third of esophagus (H)       * LORazepam 0.5 MG tablet    ATIVAN    30 tablet    Take 1 tablet (0.5 mg) by mouth every 4 hours as needed (Anxiety, Nausea/Vomiting or Sleep)    Cancer of distal third of esophagus (H)       Multi-vitamin Tabs tablet      Take 1 tablet by mouth daily        omeprazole 40 MG capsule    priLOSEC    90 capsule    Take 1 capsule (40 mg) by mouth daily    Cancer of distal third of esophagus (H)       ondansetron 8 MG tablet    ZOFRAN    30 tablet    Take 1 tablet (8 mg) by mouth every 8 hours as needed (Nausea/Vomiting)    Cancer of distal third of esophagus (H)       prochlorperazine 10 MG tablet    COMPAZINE    30 tablet    Take 1 tablet (10 mg) by mouth every 6 hours as needed (Nausea/Vomiting)    Cancer of distal third of esophagus (H)       tadalafil 5 MG tablet    CIALIS    20 tablet    Take 2 tablets (10 mg) by mouth every 24 hours As needed for sexual activity    Malignant neoplasm of lower third of esophagus (H), Drug-induced erectile dysfunction       timolol 0.5 % ophthalmic solution    TIMOPTIC     Place into both eyes daily    Cancer of distal third of esophagus (H)       zolpidem 10 MG tablet    AMBIEN    60 tablet    Take 1 tablet (10 mg) by mouth nightly as needed    Metastasis from gastric cancer (H)       * Notice:  This list has 7 medication(s) that are the same as other medications prescribed for you. Read the directions carefully, and ask your doctor or other care provider to review them with you.

## 2018-10-02 NOTE — LETTER
10/2/2018       RE: Reuben Padilla  [address] [place] [address]     Dear Colleague,    Thank you for referring your patient, Reuben Padilla, to the [facility]. Please see a copy of my visit note below.    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 His tumor is positive for cytokeratin 20, negative for P63 and CK7, and HER2 is negative. He started on FOLFOX (5FU/oxaliplatin) on 5/15/2017. He had a delay in treatment from 9/5-10/2/17 due to work related injury.    He had an excellent response by imaging throughout the summer 2017.    He a mixed response by imaging in late fall 2017.    In January 2018, he was switched to taxol and cyramza - had slight progression and neuropathy and clinical trial became available.       In March 2018, he was started on the [facility] Match Clinical Trial with crizotinib.  (MET amplification) He remained on crizotinib from March - July 2018.    August 2018, he was switched back to taxol/cramza.    INTERVAL HISTORY: Levi comes in today for followup.  Overall, Reuben is doing reasonably well.  He has developed hypertension while on cyramza and is now on amlodipine and lisinopril 40.      He believes his neuropathy is stable and maybe improved.  It is now until about his ankles as opposed to his knees.    He has had no problems with balance or falls.  No numbness or tingling in his fingertips.  He reports no fevers or chills.      He thinks that his dysphagia is slightly worse.  He monitors his diet to avoid dysphagia.  He had an episode yesterday that his food became stuck and he subesequently vomited.      ECOG 1.     His 10-point review of systems is otherwise negative.        PHYSICAL EXAMINATION  /87  Pulse 93  Temp 97.6  F  "(36.4  C) (Oral)  Resp 16  Ht 1.981 m (6' 6\")  Wt (!) 162 kg (357 lb 4 oz)  SpO2 98%  BMI 41.28 kg/m2   Wt Readings from Last 4 Encounters:   10/02/18 (!) 162 kg (357 lb 4 oz)   09/04/18 (!) 172.3 kg (379 lb 12.8 oz)   08/28/18 (!) 168.7 kg (372 lb)   08/17/18 (!) 168 kg (370 lb 6 oz)     Constitutional: Alert, oriented male in no visible distress.  Eyes: PERRL. Anicteric sclerae.  ENT/Mouth: OM moist and pink without lesions or thrush.  CV: RRR  Resp: CTAB throughout  Abdomen: Soft, non-tender, non-distended. Obese. Bowel sounds present. Unable to palpate liver or spleen.   Extremities: trace edema   Skin: Warm, dry.   Lymph: No cervical or supraclavicular lymphadenopathy appreciated.   Neuro: CN II-XII grossly intact.  Absent reflexed at knees bilaterally    ECOG PS: 1    Lab Results   Component Value Date    WBC 3.0 10/01/2018     Lab Results   Component Value Date    RBC 4.07 10/01/2018     Lab Results   Component Value Date    HGB 12.0 10/01/2018     Lab Results   Component Value Date    HCT 37.1 10/01/2018     No components found for: MCT  Lab Results   Component Value Date    MCV 91.2 10/01/2018     Lab Results   Component Value Date    MCH 29.5 10/01/2018     Lab Results   Component Value Date    MCHC 32.3 10/01/2018     Lab Results   Component Value Date    RDW 17.2 10/01/2018     Lab Results   Component Value Date     10/01/2018     09/04/2018       Recent Labs   Lab Test  07/24/18   1516  07/17/18   1250   NA  140  143   POTASSIUM  4.2  4.5   CHLORIDE  108  109   CO2  26  24   ANIONGAP  6  10   GLC  100*  95   BUN  10  15   CR  0.91  1.01   NIDHI  8.4*  8.6     Liver Function Studies -   Recent Labs   Lab Test  10/01/18   1311 09/10/18   PROTTOTAL   --   6.7   ALBUMIN  3.9  3.7   BILITOTAL  0.6  0.9   ALKPHOS  70  67   AST  21  17   ALT  19  13            IMPRESSION/PLAN:  1. Metastatic esophogeal adenocarcinoma. He has had treatment with FOLFOX and then transitioned to Taxol with " ramicurimab.  He was then on crizotinib on the [facility] Match study.  He progressed on this and returned on taxol/cyramza.  He is tolerating this well and by CT has had a nice response to therapy.  I am concerned however that he may be beginning to progress given his dysphagia.    We discussed continuing his therapy day 1, 8 every 21 days and then reimaging.  I'd like him to also have endoscopy and possible stent placement.    His [facility] One testing did return with low positive PDL 1 status, indicating he could be a candidate for Keytruda.  This was approved by insurance.    Also discussed that he should continue to enjoy his time and complete any potential travel plans he has, possibly a trip to [place], in the near future given that he is currently feeling well.    2. Edema -  stable     3. He has a history of pulmonary embolism.  He is on Lovenox twice daily.  He has progressed through Xarelto in the past.       4. He does have baseline neuropathy.  Gabapentin is maintained at 600/300/600 mg daily.      He understands his therapies are not curative intent, but rather palliative.    Kadi Dee

## 2018-10-02 NOTE — NURSING NOTE
"Oncology Rooming Note    October 2, 2018 11:45 AM   Reuben Padilla is a 58 year old male who presents for:    Chief Complaint   Patient presents with     Oncology Clinic Visit     Return: Esophageal CA     Initial Vitals: /87  Pulse 93  Temp 97.6  F (36.4  C) (Oral)  Resp 16  Ht 1.981 m (6' 6\")  Wt (!) 162 kg (357 lb 4 oz)  SpO2 98%  BMI 41.28 kg/m2 Estimated body mass index is 41.28 kg/(m^2) as calculated from the following:    Height as of this encounter: 1.981 m (6' 6\").    Weight as of this encounter: 162 kg (357 lb 4 oz). Body surface area is 2.99 meters squared.  No Pain (0) Comment: Data Unavailable   No LMP for male patient.  Allergies reviewed: Yes  Medications reviewed: Yes    Medications: Medication refills not needed today.  Pharmacy name entered into Instapage:    [id number] - [place] - [address] [place] [address] AT INTERSECTION 109[facility] [place], [place] - [address] [place] [address] 1-030  [id number] - [place], [place] - [address] [facility] [address] [facility]    Clinical concerns: new concerns are that he doesn't think that treatments are working, because he is having trouble eating again, and he doesn't feel like waiting another 3 weeks for his CT. Dr. Dee was notified.    10 minutes for nursing intake (face to face time)     Isis Calhoun CMA              "

## 2018-10-02 NOTE — PROGRESS NOTES
Infusion Nursing Note:  Reuben Padilla presents today for Cycle 4 day 1, Cyramza and Taxol.    Patient seen by provider today: Yes: Dr. Kadi Dee   present during visit today: Not Applicable.    Note: Patient arrived ambulatory accompanied by sister with no new complaints or concerns. Per Dr. Dee, today's treatment is Cycle 4 Day 1. Patient's urine protein yesterday was 45. I paged Dr. Dee about this to see if patient needs to have a 24 hour urine done since urine protein is >30. Dr. Dee return call received.    HONORIO Dee 10/2/18 1400/R.V. Alley Brambila RN: Check 24 hour urine protein prior to next Cyramza infusion on 10/23/18.    Patient given instructions on how to collect 24 hour urine. In basket message sent to care coordinator Trinh King RN to send order to the outside lab where patient goes for his labs that is closer to home. Patient aware that he needs to do this sometime prior to next Cyramza which is on 10/23/18.    Intravenous Access:  Implanted Port.  Patient arrived with right chest port a cath already accessed. Port flushes easy and gives an excellent blood return.     Treatment Conditions:  Lab Results   Component Value Date    HGB 12.0 10/01/2018     Lab Results   Component Value Date    WBC 3.0 10/01/2018      Lab Results   Component Value Date    ANEU 1.8 10/01/2018     Lab Results   Component Value Date     10/01/2018     09/04/2018      Results reviewed, labs MET treatment parameters, ok to proceed with treatment.  Urine protein is 45. Orders received to check a 24 hour urine prior to next Cyramza infusion which is 10/23/18.      Post Infusion Assessment:  Patient tolerated infusion without incident.  Blood return noted pre and post infusion.  Site patent and intact, free from redness, edema or discomfort.  No evidence of extravasations.  Access discontinued per protocol.    Discharge Plan:   Patient declined prescription refills.  Discharge  instructions reviewed with: Patient.  Patient and/or family verbalized understanding of discharge instructions and all questions answered.  Copy of AVS reviewed with patient and/or family.  Patient will return 10/9/18  for next appointment.  Patient discharged in stable condition accompanied by: sister.  Departure Mode: Ambulatory.    Alley Brambila RN

## 2018-10-03 ENCOUNTER — TELEPHONE (OUTPATIENT)
Dept: ONCOLOGY | Facility: CLINIC | Age: 58
End: 2018-10-03

## 2018-10-03 ENCOUNTER — TELEPHONE (OUTPATIENT)
Dept: GASTROENTEROLOGY | Facility: CLINIC | Age: 58
End: 2018-10-03

## 2018-10-03 NOTE — TELEPHONE ENCOUNTER
Patient scheduled for EGD     Indication for procedure. Dysphagia     Referring Provider. Kadi Dee MD    ? No     Arrival time verified? 215 pm     Facility location verified? [address] [facility], 5th floor     Instructions given regarding prep and procedure    Prep Type NPO     Are you taking any anticoagulants or blood thinners? Lovenox. Will hold morning dose     Instructions given? Yes     Electronic implanted devices? Denies     Pre procedure teaching completed? Yes    Transportation from procedure? [facility], son will be with     H&P / Pre op physical completed? N/A    Alexander Burton RN

## 2018-10-03 NOTE — TELEPHONE ENCOUNTER
Per Patient's request,  completed and faxed [facility] referral for lodging dates 10/10 - 10/12. [facility] will contact Patient directly for confirmation of reservation.  will continue to provide support as needed.        Nieves Casper (Martens), [facility], MSW   - [facility]  Phone: [phone]  Pager: [phone]  10/3/2018

## 2018-10-05 ENCOUNTER — TRANSFERRED RECORDS (OUTPATIENT)
Dept: HEALTH INFORMATION MANAGEMENT | Facility: CLINIC | Age: 58
End: 2018-10-05

## 2018-10-05 LAB
ALBUMIN 24H UR-MCNC: 468 MG/24HR (ref 0–300)
CREATINE URINE: 114.3 MG/DL
Lab: 1625 ML
Lab: 1857.38 MG/24HR (ref 950–2490)
Lab: 24

## 2018-10-05 NOTE — PROGRESS NOTES
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 His tumor is positive for cytokeratin 20, negative for P63 and CK7, and HER2 is negative. He started on FOLFOX (5FU/oxaliplatin) on 5/15/2017. He had a delay in treatment from 9/5-10/2/17 due to work related injury.    He had an excellent response by imaging throughout the summer 2017.    He a mixed response by imaging in late fall 2017.    In January 2018, he was switched to taxol and cyramza - had slight progression and neuropathy and clinical trial became available.       In March 2018, he was started on the [facility] Match Clinical Trial with crizotinib.  (MET amplification) He remained on crizotinib from March - July 2018.    August 2018, he was switched back to taxol/cramza.    INTERVAL HISTORY: Levi comes in today for followup.  Overall, Reuben is doing reasonably well.  He has developed hypertension while on cyramza and is now on amlodipine and lisinopril 40.      He believes his neuropathy is stable and maybe improved.  It is now until about his ankles as opposed to his knees.    He has had no problems with balance or falls.  No numbness or tingling in his fingertips.  He reports no fevers or chills.      He thinks that his dysphagia is slightly worse.  He monitors his diet to avoid dysphagia.  He had an episode yesterday that his food became stuck and he subesequently vomited.      ECOG 1.     His 10-point review of systems is otherwise negative.        PHYSICAL EXAMINATION  /87  Pulse 93  Temp 97.6  F (36.4  C) (Oral)  Resp 16  Ht 1.981 m (6' 6\")  Wt (!) 162 kg (357 lb 4 oz)  SpO2 98%  BMI 41.28 kg/m2   Wt Readings from Last 4 Encounters:   10/02/18 (!) 162 kg (357 lb 4 oz)   09/04/18 (!) 172.3 kg (379 lb 12.8 oz)   08/28/18 (!) 168.7 " kg (372 lb)   08/17/18 (!) 168 kg (370 lb 6 oz)     Constitutional: Alert, oriented male in no visible distress.  Eyes: PERRL. Anicteric sclerae.  ENT/Mouth: OM moist and pink without lesions or thrush.  CV: RRR  Resp: CTAB throughout  Abdomen: Soft, non-tender, non-distended. Obese. Bowel sounds present. Unable to palpate liver or spleen.   Extremities: trace edema   Skin: Warm, dry.   Lymph: No cervical or supraclavicular lymphadenopathy appreciated.   Neuro: CN II-XII grossly intact.  Absent reflexed at knees bilaterally    ECOG PS: 1    Lab Results   Component Value Date    WBC 3.0 10/01/2018     Lab Results   Component Value Date    RBC 4.07 10/01/2018     Lab Results   Component Value Date    HGB 12.0 10/01/2018     Lab Results   Component Value Date    HCT 37.1 10/01/2018     No components found for: MCT  Lab Results   Component Value Date    MCV 91.2 10/01/2018     Lab Results   Component Value Date    MCH 29.5 10/01/2018     Lab Results   Component Value Date    MCHC 32.3 10/01/2018     Lab Results   Component Value Date    RDW 17.2 10/01/2018     Lab Results   Component Value Date     10/01/2018     09/04/2018       Recent Labs   Lab Test  07/24/18   1516  07/17/18   1250   NA  140  143   POTASSIUM  4.2  4.5   CHLORIDE  108  109   CO2  26  24   ANIONGAP  6  10   GLC  100*  95   BUN  10  15   CR  0.91  1.01   NIDHI  8.4*  8.6     Liver Function Studies -   Recent Labs   Lab Test  10/01/18   1311 09/10/18   PROTTOTAL   --   6.7   ALBUMIN  3.9  3.7   BILITOTAL  0.6  0.9   ALKPHOS  70  67   AST  21  17   ALT  19  13            IMPRESSION/PLAN:  1. Metastatic esophogeal adenocarcinoma. He has had treatment with FOLFOX and then transitioned to Taxol with ramicurimab.  He was then on crizotinib on the [facility] Match study.  He progressed on this and returned on taxol/cyramza.  He is tolerating this well and by CT has had a nice response to therapy.  I am concerned however that he may be beginning to  progress given his dysphagia.    We discussed continuing his therapy day 1, 8 every 21 days and then reimaging.  I'd like him to also have endoscopy and possible stent placement.    His [facility] testing did return with low positive PDL 1 status, indicating he could be a candidate for Keytruda.  This was approved by insurance.    Also discussed that he should continue to enjoy his time and complete any potential travel plans he has, possibly a trip to [place], in the near future given that he is currently feeling well.    2. Edema -  stable     3. He has a history of pulmonary embolism.  He is on Lovenox twice daily.  He has progressed through Xarelto in the past.       4. He does have baseline neuropathy.  Gabapentin is maintained at 600/300/600 mg daily.      He understands his therapies are not curative intent, but rather palliative.    Kadi Dee

## 2018-10-08 DIAGNOSIS — I10 ESSENTIAL HYPERTENSION: ICD-10-CM

## 2018-10-08 RX ORDER — AMLODIPINE BESYLATE 5 MG/1
10 TABLET ORAL DAILY
Qty: 30 TABLET | Refills: 0 | Status: SHIPPED | OUTPATIENT
Start: 2018-10-08 | End: 2018-10-15

## 2018-10-09 ENCOUNTER — CARE COORDINATION (OUTPATIENT)
Dept: ONCOLOGY | Facility: CLINIC | Age: 58
End: 2018-10-09

## 2018-10-09 NOTE — PROGRESS NOTES
Spoke with Levi to update him on schedule changes for tomorrow's EGD and CT scan.  He agrees to see Dr Dee on Friday 10/12 at 9:30 to review results.   Contacted [facility] to extend his stay to 10/12.  No additional questions or concerns.

## 2018-10-10 ENCOUNTER — APPOINTMENT (OUTPATIENT)
Dept: GENERAL RADIOLOGY | Facility: CLINIC | Age: 58
End: 2018-10-10
Attending: INTERNAL MEDICINE
Payer: COMMERCIAL

## 2018-10-10 ENCOUNTER — HOSPITAL ENCOUNTER (OUTPATIENT)
Dept: CT IMAGING | Facility: CLINIC | Age: 58
End: 2018-10-10
Attending: INTERNAL MEDICINE
Payer: COMMERCIAL

## 2018-10-10 ENCOUNTER — ANESTHESIA EVENT (OUTPATIENT)
Dept: SURGERY | Facility: CLINIC | Age: 58
End: 2018-10-10
Payer: COMMERCIAL

## 2018-10-10 ENCOUNTER — HOSPITAL ENCOUNTER (OUTPATIENT)
Facility: CLINIC | Age: 58
Discharge: HOME | End: 2018-10-10
Attending: INTERNAL MEDICINE | Admitting: INTERNAL MEDICINE
Payer: COMMERCIAL

## 2018-10-10 ENCOUNTER — SURGERY (OUTPATIENT)
Age: 58
End: 2018-10-10

## 2018-10-10 ENCOUNTER — ANESTHESIA (OUTPATIENT)
Dept: SURGERY | Facility: CLINIC | Age: 58
End: 2018-10-10
Payer: COMMERCIAL

## 2018-10-10 VITALS
DIASTOLIC BLOOD PRESSURE: 107 MMHG | OXYGEN SATURATION: 98 % | BODY MASS INDEX: 36.45 KG/M2 | RESPIRATION RATE: 16 BRPM | HEIGHT: 78 IN | SYSTOLIC BLOOD PRESSURE: 144 MMHG | WEIGHT: 315 LBS | TEMPERATURE: 98 F | HEART RATE: 111 BPM

## 2018-10-10 DIAGNOSIS — C15.5 CANCER OF DISTAL THIRD OF ESOPHAGUS (H): ICD-10-CM

## 2018-10-10 DIAGNOSIS — I15.8 OTHER SECONDARY HYPERTENSION: ICD-10-CM

## 2018-10-10 LAB
ALBUMIN SERPL-MCNC: 3.4 G/DL (ref 3.4–5)
ALP SERPL-CCNC: 76 U/L (ref 40–150)
ALT SERPL W P-5'-P-CCNC: 18 U/L (ref 0–70)
ANION GAP SERPL CALCULATED.3IONS-SCNC: 7 MMOL/L (ref 3–14)
AST SERPL W P-5'-P-CCNC: 10 U/L (ref 0–45)
BASOPHILS # BLD AUTO: 0 10E9/L (ref 0–0.2)
BASOPHILS NFR BLD AUTO: 0.5 %
BILIRUB SERPL-MCNC: 0.5 MG/DL (ref 0.2–1.3)
BUN SERPL-MCNC: 10 MG/DL (ref 7–30)
CALCIUM SERPL-MCNC: 8.4 MG/DL (ref 8.5–10.1)
CHLORIDE SERPL-SCNC: 110 MMOL/L (ref 94–109)
CO2 SERPL-SCNC: 25 MMOL/L (ref 20–32)
CREAT SERPL-MCNC: 0.76 MG/DL (ref 0.66–1.25)
DIFFERENTIAL METHOD BLD: ABNORMAL
EOSINOPHIL # BLD AUTO: 0 10E9/L (ref 0–0.7)
EOSINOPHIL NFR BLD AUTO: 0.2 %
ERYTHROCYTE [DISTWIDTH] IN BLOOD BY AUTOMATED COUNT: 16 % (ref 10–15)
GFR SERPL CREATININE-BSD FRML MDRD: >90 ML/MIN/1.7M2
GLUCOSE BLDC GLUCOMTR-MCNC: 89 MG/DL (ref 70–99)
GLUCOSE SERPL-MCNC: 92 MG/DL (ref 70–99)
HCT VFR BLD AUTO: 35.6 % (ref 40–53)
HGB BLD-MCNC: 11.4 G/DL (ref 13.3–17.7)
IMM GRANULOCYTES # BLD: 0.1 10E9/L (ref 0–0.4)
IMM GRANULOCYTES NFR BLD: 1.2 %
INR PPP: 1.1 (ref 0.86–1.14)
LYMPHOCYTES # BLD AUTO: 1 10E9/L (ref 0.8–5.3)
LYMPHOCYTES NFR BLD AUTO: 24.1 %
MCH RBC QN AUTO: 29.3 PG (ref 26.5–33)
MCHC RBC AUTO-ENTMCNC: 32 G/DL (ref 31.5–36.5)
MCV RBC AUTO: 92 FL (ref 78–100)
MONOCYTES # BLD AUTO: 0.4 10E9/L (ref 0–1.3)
MONOCYTES NFR BLD AUTO: 9 %
NEUTROPHILS # BLD AUTO: 2.6 10E9/L (ref 1.6–8.3)
NEUTROPHILS NFR BLD AUTO: 65 %
NRBC # BLD AUTO: 0 10*3/UL
NRBC BLD AUTO-RTO: 0 /100
PLATELET # BLD AUTO: 144 10E9/L (ref 150–450)
POTASSIUM SERPL-SCNC: 3.9 MMOL/L (ref 3.4–5.3)
PROT SERPL-MCNC: 6.8 G/DL (ref 6.8–8.8)
RBC # BLD AUTO: 3.89 10E12/L (ref 4.4–5.9)
SODIUM SERPL-SCNC: 142 MMOL/L (ref 133–144)
UPPER GI ENDOSCOPY: NORMAL
WBC # BLD AUTO: 4 10E9/L (ref 4–11)

## 2018-10-10 PROCEDURE — 36000053 ZZH SURGERY LEVEL 2 EA 15 ADDTL MIN - UMMC: Performed by: INTERNAL MEDICINE

## 2018-10-10 PROCEDURE — 37000009 ZZH ANESTHESIA TECHNICAL FEE, EACH ADDTL 15 MIN: Performed by: INTERNAL MEDICINE

## 2018-10-10 PROCEDURE — 25000128 H RX IP 250 OP 636: Performed by: NURSE ANESTHETIST, CERTIFIED REGISTERED

## 2018-10-10 PROCEDURE — 71000015 ZZH RECOVERY PHASE 1 LEVEL 2 EA ADDTL HR: Performed by: INTERNAL MEDICINE

## 2018-10-10 PROCEDURE — 36000051 ZZH SURGERY LEVEL 2 1ST 30 MIN - UMMC: Performed by: INTERNAL MEDICINE

## 2018-10-10 PROCEDURE — 71000027 ZZH RECOVERY PHASE 2 EACH 15 MINS: Performed by: INTERNAL MEDICINE

## 2018-10-10 PROCEDURE — 25000566 ZZH SEVOFLURANE, EA 15 MIN: Performed by: INTERNAL MEDICINE

## 2018-10-10 PROCEDURE — 40000170 ZZH STATISTIC PRE-PROCEDURE ASSESSMENT II: Performed by: INTERNAL MEDICINE

## 2018-10-10 PROCEDURE — 80053 COMPREHEN METABOLIC PANEL: CPT | Performed by: INTERNAL MEDICINE

## 2018-10-10 PROCEDURE — 25000125 ZZHC RX 250: Performed by: NURSE ANESTHETIST, CERTIFIED REGISTERED

## 2018-10-10 PROCEDURE — 25000128 H RX IP 250 OP 636: Performed by: STUDENT IN AN ORGANIZED HEALTH CARE EDUCATION/TRAINING PROGRAM

## 2018-10-10 PROCEDURE — 85610 PROTHROMBIN TIME: CPT | Performed by: INTERNAL MEDICINE

## 2018-10-10 PROCEDURE — 27210794 ZZH OR GENERAL SUPPLY STERILE: Performed by: INTERNAL MEDICINE

## 2018-10-10 PROCEDURE — 74177 CT ABD & PELVIS W/CONTRAST: CPT

## 2018-10-10 PROCEDURE — 82962 GLUCOSE BLOOD TEST: CPT

## 2018-10-10 PROCEDURE — 25000128 H RX IP 250 OP 636: Performed by: ANESTHESIOLOGY

## 2018-10-10 PROCEDURE — 40000277 XR SURGERY CARM FLUORO LESS THAN 5 MIN W STILLS: Mod: TC

## 2018-10-10 PROCEDURE — 71000014 ZZH RECOVERY PHASE 1 LEVEL 2 FIRST HR: Performed by: INTERNAL MEDICINE

## 2018-10-10 PROCEDURE — 85025 COMPLETE CBC W/AUTO DIFF WBC: CPT | Performed by: INTERNAL MEDICINE

## 2018-10-10 PROCEDURE — 37000008 ZZH ANESTHESIA TECHNICAL FEE, 1ST 30 MIN: Performed by: INTERNAL MEDICINE

## 2018-10-10 RX ORDER — FENTANYL CITRATE 50 UG/ML
INJECTION, SOLUTION INTRAMUSCULAR; INTRAVENOUS PRN
Status: DISCONTINUED | OUTPATIENT
Start: 2018-10-10 | End: 2018-10-10

## 2018-10-10 RX ORDER — HEPARIN SODIUM (PORCINE) LOCK FLUSH IV SOLN 100 UNIT/ML 100 UNIT/ML
5 SOLUTION INTRAVENOUS
Status: DISCONTINUED | OUTPATIENT
Start: 2018-10-10 | End: 2018-10-10 | Stop reason: HOSPADM

## 2018-10-10 RX ORDER — LIDOCAINE 40 MG/G
CREAM TOPICAL
Status: DISCONTINUED | OUTPATIENT
Start: 2018-10-10 | End: 2018-10-10 | Stop reason: HOSPADM

## 2018-10-10 RX ORDER — IOPAMIDOL 755 MG/ML
135 INJECTION, SOLUTION INTRAVASCULAR ONCE
Status: COMPLETED | OUTPATIENT
Start: 2018-10-10 | End: 2018-10-10

## 2018-10-10 RX ORDER — HEPARIN SODIUM (PORCINE) LOCK FLUSH IV SOLN 100 UNIT/ML 100 UNIT/ML
5 SOLUTION INTRAVENOUS ONCE
Status: DISCONTINUED | OUTPATIENT
Start: 2018-10-10 | End: 2018-10-11 | Stop reason: HOSPADM

## 2018-10-10 RX ORDER — ONDANSETRON 2 MG/ML
4 INJECTION INTRAMUSCULAR; INTRAVENOUS
Status: DISCONTINUED | OUTPATIENT
Start: 2018-10-10 | End: 2018-10-10 | Stop reason: HOSPADM

## 2018-10-10 RX ORDER — PROPOFOL 10 MG/ML
INJECTION, EMULSION INTRAVENOUS PRN
Status: DISCONTINUED | OUTPATIENT
Start: 2018-10-10 | End: 2018-10-10

## 2018-10-10 RX ORDER — FLUMAZENIL 0.1 MG/ML
0.2 INJECTION, SOLUTION INTRAVENOUS
Status: DISCONTINUED | OUTPATIENT
Start: 2018-10-10 | End: 2018-10-10 | Stop reason: HOSPADM

## 2018-10-10 RX ORDER — NALOXONE HYDROCHLORIDE 0.4 MG/ML
.1-.4 INJECTION, SOLUTION INTRAMUSCULAR; INTRAVENOUS; SUBCUTANEOUS
Status: DISCONTINUED | OUTPATIENT
Start: 2018-10-10 | End: 2018-10-10 | Stop reason: HOSPADM

## 2018-10-10 RX ORDER — HEPARIN SODIUM,PORCINE 10 UNIT/ML
5-10 VIAL (ML) INTRAVENOUS EVERY 24 HOURS
Status: DISCONTINUED | OUTPATIENT
Start: 2018-10-10 | End: 2018-10-10 | Stop reason: HOSPADM

## 2018-10-10 RX ORDER — SODIUM CHLORIDE, SODIUM LACTATE, POTASSIUM CHLORIDE, CALCIUM CHLORIDE 600; 310; 30; 20 MG/100ML; MG/100ML; MG/100ML; MG/100ML
INJECTION, SOLUTION INTRAVENOUS CONTINUOUS PRN
Status: DISCONTINUED | OUTPATIENT
Start: 2018-10-10 | End: 2018-10-10

## 2018-10-10 RX ORDER — HEPARIN SODIUM,PORCINE 10 UNIT/ML
5-10 VIAL (ML) INTRAVENOUS
Status: DISCONTINUED | OUTPATIENT
Start: 2018-10-10 | End: 2018-10-10 | Stop reason: HOSPADM

## 2018-10-10 RX ORDER — METOPROLOL TARTRATE 1 MG/ML
INJECTION, SOLUTION INTRAVENOUS PRN
Status: DISCONTINUED | OUTPATIENT
Start: 2018-10-10 | End: 2018-10-10

## 2018-10-10 RX ORDER — ONDANSETRON 2 MG/ML
INJECTION INTRAMUSCULAR; INTRAVENOUS PRN
Status: DISCONTINUED | OUTPATIENT
Start: 2018-10-10 | End: 2018-10-10

## 2018-10-10 RX ORDER — GLYCOPYRROLATE 0.2 MG/ML
INJECTION, SOLUTION INTRAMUSCULAR; INTRAVENOUS PRN
Status: DISCONTINUED | OUTPATIENT
Start: 2018-10-10 | End: 2018-10-10

## 2018-10-10 RX ORDER — LIDOCAINE HYDROCHLORIDE 20 MG/ML
INJECTION, SOLUTION INFILTRATION; PERINEURAL PRN
Status: DISCONTINUED | OUTPATIENT
Start: 2018-10-10 | End: 2018-10-10

## 2018-10-10 RX ADMIN — Medication 140 MG: at 16:25

## 2018-10-10 RX ADMIN — PROPOFOL 150 MG: 10 INJECTION, EMULSION INTRAVENOUS at 16:25

## 2018-10-10 RX ADMIN — ONDANSETRON 4 MG: 2 INJECTION INTRAMUSCULAR; INTRAVENOUS at 16:45

## 2018-10-10 RX ADMIN — LIDOCAINE HYDROCHLORIDE 60 MG: 20 INJECTION, SOLUTION INFILTRATION; PERINEURAL at 16:50

## 2018-10-10 RX ADMIN — FENTANYL CITRATE 50 MCG: 50 INJECTION, SOLUTION INTRAMUSCULAR; INTRAVENOUS at 16:27

## 2018-10-10 RX ADMIN — FENTANYL CITRATE 50 MCG: 50 INJECTION, SOLUTION INTRAMUSCULAR; INTRAVENOUS at 16:20

## 2018-10-10 RX ADMIN — MIDAZOLAM 2 MG: 1 INJECTION INTRAMUSCULAR; INTRAVENOUS at 16:13

## 2018-10-10 RX ADMIN — ROCURONIUM BROMIDE 30 MG: 10 INJECTION INTRAVENOUS at 16:34

## 2018-10-10 RX ADMIN — METOPROLOL TARTRATE 1 MG: 5 INJECTION INTRAVENOUS at 16:34

## 2018-10-10 RX ADMIN — METOPROLOL TARTRATE 1 MG: 5 INJECTION INTRAVENOUS at 16:30

## 2018-10-10 RX ADMIN — HEPARIN SODIUM (PORCINE) LOCK FLUSH IV SOLN 100 UNIT/ML 5 ML: 100 SOLUTION at 18:47

## 2018-10-10 RX ADMIN — SODIUM CHLORIDE, POTASSIUM CHLORIDE, SODIUM LACTATE AND CALCIUM CHLORIDE: 600; 310; 30; 20 INJECTION, SOLUTION INTRAVENOUS at 16:13

## 2018-10-10 RX ADMIN — IOPAMIDOL 135 ML: 755 INJECTION, SOLUTION INTRAVENOUS at 12:43

## 2018-10-10 RX ADMIN — LIDOCAINE HYDROCHLORIDE 100 MG: 20 INJECTION, SOLUTION INFILTRATION; PERINEURAL at 16:25

## 2018-10-10 RX ADMIN — GLYCOPYRROLATE 0.2 MG: 0.2 INJECTION, SOLUTION INTRAMUSCULAR; INTRAVENOUS at 16:45

## 2018-10-10 RX ADMIN — SUGAMMADEX 200 MG: 100 INJECTION, SOLUTION INTRAVENOUS at 16:45

## 2018-10-10 ASSESSMENT — LIFESTYLE VARIABLES: TOBACCO_USE: 1

## 2018-10-10 ASSESSMENT — ENCOUNTER SYMPTOMS: DYSRHYTHMIAS: 1

## 2018-10-10 NOTE — OR NURSING
"Pt was seen by Dr. Joel at the bedside,spoke to pt and son\"Maulik\" regarding the procedure. Questions encouraged and answered. Blood drown for CMP,CBC and INR. Consent signed for procedure at this time.   "

## 2018-10-10 NOTE — PROGRESS NOTES
PT has diastolic over 100. Pt told writer hat he had not taken his Norvasc in 2 days. Called [facility] for a hydralazine order. 5mg given IV.

## 2018-10-10 NOTE — ANESTHESIA POSTPROCEDURE EVALUATION
Patient: Reuben Padilla    Procedure(s):  Esophagogastroduodenoscopy  - Wound Class: II-Clean Contaminated    Diagnosis:Dysphagia   Diagnosis Additional Information: No value filed.    Anesthesia Type:  General, RSI    Note:  Anesthesia Post Evaluation    Patient location during evaluation: PACU  Patient participation: Able to fully participate in evaluation  Level of consciousness: awake and alert  Pain management: adequate  Airway patency: patent  Cardiovascular status: acceptable  Hydration status: acceptable  PONV: none     Anesthetic complications: None          Last vitals:  Vitals:    10/10/18 1329 10/10/18 1700   BP: (!) 143/108    Pulse: 111    Resp: 16 (P) 18   Temp: 37  C (98.6  F) (P) 36.2  C (97.2  F)   SpO2: 100%          Electronically Signed By: Andrey Ortiz MD  October 10, 2018  5:09 PM

## 2018-10-10 NOTE — ANESTHESIA CARE TRANSFER NOTE
Patient: Reuben Padilla    Procedure(s):  Esophagogastroduodenoscopy  - Wound Class: II-Clean Contaminated    Diagnosis: Dysphagia   Diagnosis Additional Information: No value filed.    Anesthesia Type:   General, RSI     Note:  Airway :Face Mask  Patient transferred to:PACU  Comments: VSS. Report to RN. 135/96. 98%. Patient awake and conversing.Handoff Report: Identifed the Patient, Identified the Reponsible Provider, Reviewed the pertinent medical history, Discussed the surgical course, Reviewed Intra-OP anesthesia mangement and issues during anesthesia, Set expectations for post-procedure period and Allowed opportunity for questions and acknowledgement of understanding      Vitals: (Last set prior to Anesthesia Care Transfer)    CRNA VITALS  10/10/2018 1625 - 10/10/2018 1702      10/10/2018             NIBP: (!)  135/96    Pulse: 80    SpO2: 100 %    EKG: Sinus rhythm;Atrial fibrillation     Some beats of A fib on EKG                Electronically Signed By: PADMA Campos CRNA  October 10, 2018  5:02 PM

## 2018-10-10 NOTE — DISCHARGE INSTRUCTIONS
Take it easy when you get home.  Remember, same day surgery DOES NOT MEAN SAME DAY RECOVERY!  Healing is a gradual process.  You will need some time to recover - you may be more tired than you realize at first.  Rest and relax for at least the first 24 hours at home.  You'll feel better and heal faster if you take good care of yourself.  [facility], [place]  Same-Day Surgery   Adult Discharge Orders & Instructions     For 24 hours after surgery    1. Get plenty of rest.  A responsible adult must stay with you for at least 24 hours after you leave the hospital.   2. Do not drive or use heavy equipment.  If you have weakness or tingling, don't drive or use heavy equipment until this feeling goes away.  3. Do not drink alcohol.  4. Avoid strenuous or risky activities.  Ask for help when climbing stairs.   5. You may feel lightheaded.  IF so, sit for a few minutes before standing.  Have someone help you get up.   6. If you have nausea (feel sick to your stomach): Drink only clear liquids such as apple juice, ginger ale, broth or 7-Up.  Rest may also help.  Be sure to drink enough fluids.  Move to a regular diet as you feel able.  7. You may have a slight fever. Call the doctor if your fever is over 100 F (37.7 C) (taken under the tongue) or lasts longer than 24 hours.  8. You may have a dry mouth, a sore throat, muscle aches or trouble sleeping.  These should go away after 24 hours.  9. Do not make important or legal decisions.   Call your doctor for any of the followin.  Signs of infection (fever, growing tenderness at the surgery site, a large amount of drainage or bleeding, severe pain, foul-smelling drainage, redness, swelling).    2. It has been over 8 to 10 hours since surgery and you are still not able to urinate (pass water).    3.  Headache for over 24 hours.      To contact a doctor, call Dr Joel at [phone] (clinic)  or:        [phone] and ask for the resident  on call for          Gastroenterology (answered 24 hours a day)- at night or on the weekend      Emergency Department:    [facility]: [phone]       (TTY for hearing impaired: [phone])

## 2018-10-10 NOTE — IP AVS SNAPSHOT
Post Anesthesia Care Unit [address] [address]    Phone:  [phone]                                       After Visit Summary   10/10/2018    Reuben Padilla    MRN: [id number]           After Visit Summary Signature Page     I have received my discharge instructions, and my questions have been answered. I have discussed any challenges I see with this plan with the nurse or doctor.    ..........................................................................................................................................  Patient/Patient Representative Signature      ..........................................................................................................................................  Patient Representative Print Name and Relationship to Patient    ..................................................               ................................................  Date                                   Time    ..........................................................................................................................................  Reviewed by Signature/Title    ...................................................              ..............................................  Date                                               Time          22EPIC Rev 08/18

## 2018-10-10 NOTE — ANESTHESIA PREPROCEDURE EVALUATION
Anesthesia Evaluation     . Pt has had prior anesthetic. Type: General           ROS/MED HX    ENT/Pulmonary:  - neg pulmonary ROS   (+)tobacco use, Past use , . Other pulmonary disease   Acute pulmonary embolism  .   (-) sleep apnea   Neurologic:     (+)neuropathy - B/L LE stocking up to mid ankles,     Cardiovascular:     (+) hypertension----. : . . . :. dysrhythmias a-fib, . Previous cardiac testing Echodate:[id number]  [id number]  [id number]  [id number]^KHOI^MALAIKA^KENNEDY           [facility]  Diagnostic and Treamtent-3rd Floor  [address] [place] [address]     Name: KORI CHANEY  MRN: [id number]  : 1960  Study Date: 2017 11:10 AM  Age: 56 yrs  Gender: Male  Patient Location: [facility] [place]  Reason For Study: Malignant neoplasm of lower third of esophagus, Secondary  and  Ordering Physician: MALAIKA WESTFALL  Referring Physician: MALAIKA WESTFALL  Performed By: Edward Watson RDCS     BSA: 2.9 m2  Height: 78 in  Weight: 372 lb  _____________________________________________________________________________  __        Procedure  Echocardiogram with two-dimensional, color and spectral Doppler performed.  Contrast Optison. Optison (NDC #1731-8391-37) given intravenously. Patient was  given 3 ml mixture of 3 ml Optison and 6 ml saline. 6 ml wasted.  _____________________________________________________________________________  __        Interpretation Summary  Global and regional left ventricular function is normal with an EF of 60-65%.  LVEF 65% (traced).  The right ventricle is normal size. Global right ventricular function is  normal.  No significant valvular abnormalities.  The sinuses of valsalva are mildly dilated at 3.9 cm. Ascending aorta is upper  limits of normal in size at 3.8 cm.  The inferior vena cava was normal in size with preserved respiratory  variability. Estimated mean right atrial pressure is <3 mmHg.     Previous study not  available for comparison.     I have personally viewed the imaging and agree with the interpretation and  report as documented by the fellow, Dr. Thomas, and/or edited by me.  _____________________________________________________________________________  __        Left Ventricle  Left ventricular wall thickness is normal. Left ventricular size is normal.  Global and regional left ventricular function is normal with an EF of 60-65%.  LVEF 65% (traced). Normal left ventricular filling for age. No regional wall  motion abnormalities are seen.     Right Ventricle  The right ventricle is normal size. Global right ventricular function is  normal.     Atria  Both atria appear normal. The atrial septum is intact as assessed by color  Doppler .        Mitral Valve  The mitral valve is normal. Trace mitral insufficiency is present.     Aortic Valve  Aortic valve is normal in structure and function. The aortic valve is  tricuspid.     Tricuspid Valve  The tricuspid valve is normal. Trace tricuspid insufficiency is present.     Pulmonic Valve  The pulmonic valve is normal. Trace pulmonic insufficiency is present.     Vessels  The sinuses of valsalva are mildly dilated at 3.9 cm. Ascending aorta is upper  limits of normal in size at 3.8 cm. The inferior vena cava was normal in size  with preserved respiratory variability. The pulmonary artery is normal.  Sinuses of Valsalva 3.9 cm. Ascending aorta 3.8 cm. Estimated mean right  atrial pressure is <3 mmHg.     Pericardium  No pericardial effusion is present.        Compared to Previous Study  Previous study not available for comparison.     Attestation  I have personally viewed the imaging and agree with the interpretation and  report as documented by the fellow, Dr. Thomas, and/or edited by me.  _____________________________________________________________________________  __     MMode/2D Measurements & Calculations  IVSd: 0.92 cm  LVIDd: 5.3 cm  LVIDs: 3.7 cm  LVPWd: 0.71  cm  FS: 31.2 %  EDV(Teich): 136.4 ml  ESV(Teich): 56.6 ml  LV mass(C)d: 153.7 grams  LV mass(C)dI: 52.3 grams/m2  Ao root diam: 3.8 cm  asc Aorta Diam: 3.8 cm  LVOT diam: 2.5 cm  LVOT area: 5.0 cm2  LA Volume (BP): 98.7 ml     LA Volume Index (BP): 33.6 ml/m2        Doppler Measurements & Calculations  MV E max ivy: 98.2 cm/sec  MV A max ivy: 86.3 cm/sec  MV E/A: 1.1  MV dec time: 0.18 sec  Lateral E/e': 9.4  Medial E/e': 11.1     _____________________________________________________________________________  __           Report approved by: Temitope Lee 05/08/2017 12:54 PM  date: results:ECG reviewed date:5/2/2017 results:afib date: results:          METS/Exercise Tolerance:     Hematologic:  - neg hematologic  ROS   (+) History of blood clots (  Acute pulmonary embolism  ) -      Musculoskeletal:  - neg musculoskeletal ROS       GI/Hepatic: Comment: Esophageal cancer, dysphagia to solids not liquids, intermittent regurgitation - neg GI/hepatic ROS       Renal/Genitourinary:         Endo:     (+) Obesity, .      Psychiatric:  - neg psychiatric ROS       Infectious Disease:  - neg infectious disease ROS       Malignancy:   (+) Malignancy History of Other          Other:                     Physical Exam  Normal systems: pulmonary and dental    Airway   Mallampati: III  TM distance: <3 FB  Neck ROM: full    Dental     Cardiovascular   Rhythm and rate: irregular and normal      Pulmonary                     Anesthesia Plan      History & Physical Review  History and physical reviewed and following examination; no interval change.    ASA Status:  3 .        Plan for General and ETT with Intravenous and Propofol induction. Maintenance will be Balanced.           Postoperative Care      Consents  Anesthetic plan, risks, benefits and alternatives discussed with:  Patient..                          .

## 2018-10-10 NOTE — IP AVS SNAPSHOT
MRN:[id number]                      After Visit Summary   10/10/2018    Reuben Padilla    MRN: [id number]           Thank you!     Thank you for choosing [facility] for your care. Our goal is always to provide you with excellent care. Hearing back from our patients is one way we can continue to improve our services. Please take a few minutes to complete the written survey that you may receive in the mail after you visit with us. Thank you!        Patient Information     Date Of Birth          1960        About your hospital stay     You were admitted on:  October 10, 2018 You last received care in the:  Post Anesthesia Care Unit [facility]    You were discharged on:  October 10, 2018       Who to Call     For medical emergencies, please call 911.  For non-urgent questions about your medical care, please call your primary care provider or clinic, None  For questions related to your surgery, please call your surgery clinic        Attending Provider     Provider Leonel Garcia MD Gastroenterology       Primary Care Provider Fax #    Physician No Ref-Primary [phone]      After Care Instructions     Discharge Instructions       Resume pre procedure diet            Discharge Instructions       Restart home medications.                  Your next 10 appointments already scheduled     Oct 12, 2018  9:30 AM CDT   (Arrive by 9:15 AM)   Return Visit with Kadi Dee MD   [facility]    [address] [place] [address]  [address] [place] [id number]   [phone]            Oct 23, 2018 11:30 AM CDT   [facility] Draw with  [facility] DRAW   [facility] ([facility]    [address] [place] [address]  [address] [place] [id number]   [phone]            Oct 23, 2018 12:00 PM CDT   (Arrive by 11:45 AM)   Return Visit with Kadi Dee MD   [facility]  ([facility])    [address]  [address]  [facility] [address]   [phone]            Oct 23, 2018  1:30 PM CDT   Infusion 120 with UC ONCOLOGY INFUSION, UC 22 ATC   [facility] ([facility] [place])    [address]  [address]  [facility] [address]   [phone]              Further instructions from your care team       Take it easy when you get home.  Remember, same day surgery DOES NOT MEAN SAME DAY RECOVERY!  Healing is a gradual process.  You will need some time to recover - you may be more tired than you realize at first.  Rest and relax for at least the first 24 hours at home.  You'll feel better and heal faster if you take good care of yourself.  [facility] [place]  Same-Day Surgery   Adult Discharge Orders & Instructions     For 24 hours after surgery    1. Get plenty of rest.  A responsible adult must stay with you for at least 24 hours after you leave the hospital.   2. Do not drive or use heavy equipment.  If you have weakness or tingling, don't drive or use heavy equipment until this feeling goes away.  3. Do not drink alcohol.  4. Avoid strenuous or risky activities.  Ask for help when climbing stairs.   5. You may feel lightheaded.  IF so, sit for a few minutes before standing.  Have someone help you get up.   6. If you have nausea (feel sick to your stomach): Drink only clear liquids such as apple juice, ginger ale, broth or 7-Up.  Rest may also help.  Be sure to drink enough fluids.  Move to a regular diet as you feel able.  7. You may have a slight fever. Call the doctor if your fever is over 100 F (37.7 C) (taken under the tongue) or lasts longer than 24 hours.  8. You may have a dry mouth, a sore throat, muscle aches or trouble sleeping.  These should go away after 24 hours.  9. Do not make important or legal decisions.   Call your doctor for any of the followin.  Signs of  "infection (fever, growing tenderness at the surgery site, a large amount of drainage or bleeding, severe pain, foul-smelling drainage, redness, swelling).    2. It has been over 8 to 10 hours since surgery and you are still not able to urinate (pass water).    3.  Headache for over 24 hours.      To contact a doctor, call Dr Joel at [phone] (clinic)  or:        [phone] and ask for the resident on call for          Gastroenterology (answered 24 hours a day)- at night or on the weekend      Emergency Department:    [facility]: [phone]       (TTY for hearing impaired: [phone])          Pending Results     No orders found from 10/8/2018 to 10/11/2018.            Admission Information     Date & Time Provider Department Dept. Phone    10/10/2018 Leonel Joel MD Post Anesthesia Care Unit [facility] [phone]      Your Vitals Were     Blood Pressure Pulse Temperature Respirations Height Weight    130/97 111 97.7  F (36.5  C) 20 1.981 m (6' 6\") 163.8 kg (361 lb 1.8 oz)    Pulse Oximetry BMI (Body Mass Index)                100% 41.73 kg/m2          MyChart Information     [employer] gives you secure access to your electronic health record. If you see a primary care provider, you can also send messages to your care team and make appointments. If you have questions, please call your primary care clinic.  If you do not have a primary care provider, please call [phone] and they will assist you.        Care EveryWhere ID     This is your Care EveryWhere ID. This could be used by other organizations to access your [facility] medical records  [id number]        Equal Access to Services     OLLIE SHARPE : Hadshahana Dunlap, yeyo davis, qageorgina kageraldo sousa. So [facility] [phone].    ATENCIÓN: Si habla español, tiene a alamo disposición servicios gratuitos de asistencia lingüística. Llame al [phone].    We comply with " applicable federal civil rights laws and [place] laws. We do not discriminate on the basis of race, color, national origin, age, disability, sex, sexual orientation, or gender identity.               Review of your medicines      CONTINUE these medicines which may have CHANGED, or have new prescriptions. If we are uncertain of the size of tablets/capsules you have at home, strength may be listed as something that might have changed.        Dose / Directions    gabapentin 300 MG capsule   Commonly known as:  NEURONTIN   This may have changed:    - how much to take  - when to take this  - additional instructions   Used for:  Chemotherapy-induced neuropathy (H)        Two tablets in the am and one tablet in the afternoon and evening   Quantity:  180 capsule   Refills:  1         CONTINUE these medicines which have NOT CHANGED        Dose / Directions    amLODIPine 5 MG tablet   Commonly known as:  NORVASC   Used for:  Essential hypertension        Dose:  10 mg   Take 2 tablets (10 mg) by mouth daily   Quantity:  30 tablet   Refills:  0       diphenoxylate-atropine 2.5-0.025 MG per tablet   Commonly known as:  LOMOTIL   Used for:  Diarrhea, unspecified type        Dose:  1 tablet   Take 1 tablet by mouth 4 times daily as needed for diarrhea   Quantity:  40 tablet   Refills:  1       * enoxaparin 80 MG/0.8ML injection   Commonly known as:  LOVENOX   Used for:  Cancer of distal third of esophagus (H)        Dose:  130 mg   Inject 1.3 mLs (130 mg) Subcutaneous 2 times daily   Quantity:  60 Syringe   Refills:  3       * enoxaparin 150 MG/ML injection   Commonly known as:  LOVENOX   Used for:  Hx of pulmonary embolus        INJECT 0.86ML (130MG) TWICE A DAY FOR ACUTE PULMONARY EMBOLISM AND ESOPHAGEAL MALIGNANCY   Quantity:  60 mL   Refills:  3       fish oil-omega-3 fatty acids 1000 MG capsule        Dose:  2 g   Take 2 g by mouth daily   Refills:  0       ketamine 5% gabapentin 8% lidocaine 2.5% topical PLO cream   Used  for:  Cancer of distal third of esophagus (H), Other secondary hypertension        Use small amount topically to feet three times daily   Quantity:  30 g   Refills:  3       latanoprost 0.005 % ophthalmic solution   Commonly known as:  XALATAN   Used for:  Cancer of distal third of esophagus (H)        Dose:  1 drop   Place 1 drop into both eyes At Bedtime   Quantity:  1 Bottle   Refills:  0       lidocaine-prilocaine cream   Commonly known as:  EMLA   Used for:  Cancer of distal third of esophagus (H)        Apply topically as needed for moderate pain   Quantity:  30 g   Refills:  3       lisinopril 40 MG tablet   Commonly known as:  PRINIVIL/ZESTRIL        Dose:  40 mg   Take 40 mg by mouth daily   Refills:  1       * LORazepam 0.5 MG tablet   Commonly known as:  ATIVAN   Used for:  Cancer of distal third of esophagus (H)        Dose:  0.5 mg   Take 1 tablet (0.5 mg) by mouth every 4 hours as needed (Anxiety, Nausea/Vomiting or Sleep)   Quantity:  30 tablet   Refills:  2       * LORazepam 0.5 MG tablet   Commonly known as:  ATIVAN   Used for:  Cancer of distal third of esophagus (H)        Dose:  0.5 mg   Take 1 tablet (0.5 mg) by mouth every 4 hours as needed (Anxiety, Nausea/Vomiting or Sleep)   Quantity:  30 tablet   Refills:  2       Multi-vitamin Tabs tablet        Dose:  1 tablet   Take 1 tablet by mouth daily   Refills:  0       omeprazole 40 MG capsule   Commonly known as:  priLOSEC   Used for:  Cancer of distal third of esophagus (H)        Dose:  40 mg   Take 1 capsule (40 mg) by mouth daily   Quantity:  90 capsule   Refills:  1       ondansetron 8 MG tablet   Commonly known as:  ZOFRAN   Used for:  Cancer of distal third of esophagus (H)        Dose:  8 mg   Take 1 tablet (8 mg) by mouth every 8 hours as needed (Nausea/Vomiting)   Quantity:  30 tablet   Refills:  2       prochlorperazine 10 MG tablet   Commonly known as:  COMPAZINE   Used for:  Cancer of distal third of esophagus (H)        Dose:  10 mg    Take 1 tablet (10 mg) by mouth every 6 hours as needed (Nausea/Vomiting)   Quantity:  30 tablet   Refills:  2       tadalafil 5 MG tablet   Commonly known as:  CIALIS   Used for:  Malignant neoplasm of lower third of esophagus (H), Drug-induced erectile dysfunction        Dose:  10 mg   Take 2 tablets (10 mg) by mouth every 24 hours As needed for sexual activity   Quantity:  20 tablet   Refills:  1       timolol 0.5 % ophthalmic solution   Commonly known as:  TIMOPTIC   Used for:  Cancer of distal third of esophagus (H)        Place into both eyes daily   Refills:  0       zolpidem 10 MG tablet   Commonly known as:  AMBIEN   Used for:  Metastasis from gastric cancer (H)        Dose:  10 mg   Take 1 tablet (10 mg) by mouth nightly as needed   Quantity:  60 tablet   Refills:  1       * Notice:  This list has 4 medication(s) that are the same as other medications prescribed for you. Read the directions carefully, and ask your doctor or other care provider to review them with you.             Protect others around you: Learn how to safely use, store and throw away your medicines at www.disposemymeds.org.             Medication List: This is a list of all your medications and when to take them. Check marks below indicate your daily home schedule. Keep this list as a reference.      Medications           Morning Afternoon Evening Bedtime As Needed    amLODIPine 5 MG tablet   Commonly known as:  NORVASC   Take 2 tablets (10 mg) by mouth daily                                diphenoxylate-atropine 2.5-0.025 MG per tablet   Commonly known as:  LOMOTIL   Take 1 tablet by mouth 4 times daily as needed for diarrhea                                * enoxaparin 80 MG/0.8ML injection   Commonly known as:  LOVENOX   Inject 1.3 mLs (130 mg) Subcutaneous 2 times daily                                * enoxaparin 150 MG/ML injection   Commonly known as:  LOVENOX   INJECT 0.86ML (130MG) TWICE A DAY FOR ACUTE PULMONARY EMBOLISM AND  ESOPHAGEAL MALIGNANCY                                fish oil-omega-3 fatty acids 1000 MG capsule   Take 2 g by mouth daily                                gabapentin 300 MG capsule   Commonly known as:  NEURONTIN   Two tablets in the am and one tablet in the afternoon and evening                                ketamine 5% gabapentin 8% lidocaine 2.5% topical PLO cream   Use small amount topically to feet three times daily                                latanoprost 0.005 % ophthalmic solution   Commonly known as:  XALATAN   Place 1 drop into both eyes At Bedtime                                lidocaine-prilocaine cream   Commonly known as:  EMLA   Apply topically as needed for moderate pain                                lisinopril 40 MG tablet   Commonly known as:  PRINIVIL/ZESTRIL   Take 40 mg by mouth daily                                * LORazepam 0.5 MG tablet   Commonly known as:  ATIVAN   Take 1 tablet (0.5 mg) by mouth every 4 hours as needed (Anxiety, Nausea/Vomiting or Sleep)                                * LORazepam 0.5 MG tablet   Commonly known as:  ATIVAN   Take 1 tablet (0.5 mg) by mouth every 4 hours as needed (Anxiety, Nausea/Vomiting or Sleep)                                Multi-vitamin Tabs tablet   Take 1 tablet by mouth daily                                omeprazole 40 MG capsule   Commonly known as:  priLOSEC   Take 1 capsule (40 mg) by mouth daily                                ondansetron 8 MG tablet   Commonly known as:  ZOFRAN   Take 1 tablet (8 mg) by mouth every 8 hours as needed (Nausea/Vomiting)                                prochlorperazine 10 MG tablet   Commonly known as:  COMPAZINE   Take 1 tablet (10 mg) by mouth every 6 hours as needed (Nausea/Vomiting)                                tadalafil 5 MG tablet   Commonly known as:  CIALIS   Take 2 tablets (10 mg) by mouth every 24 hours As needed for sexual activity                                timolol 0.5 % ophthalmic solution    Commonly known as:  TIMOPTIC   Place into both eyes daily                                zolpidem 10 MG tablet   Commonly known as:  AMBIEN   Take 1 tablet (10 mg) by mouth nightly as needed                                * Notice:  This list has 4 medication(s) that are the same as other medications prescribed for you. Read the directions carefully, and ask your doctor or other care provider to review them with you.

## 2018-10-10 NOTE — OP NOTE
Upper GI Endoscopy 10/10/2018  4:08 PM [facility], [address] [place] [address] [phone]     Endoscopy Department   _______________________________________________________________________________   Patient Name: Reuben Padilla          Procedure Date: 10/10/2018 4:08 PM   MRN: [id number]                       Account Number: [id number]   YOB: 1960              Admit Type: Outpatient   Age: 58                                Gender: Male   Note Status: Finalized                Attending MD: Leonel Joel MD   Total Sedation Time:                     _______________________________________________________________________________       Procedure:           Upper GI endoscopy   Indications:         Dysphagia, Suspected stenosis of the esophagus   Providers:           Leonel Joel MD   Patient Profile:     Mr Yao is a 59yo gentleman with metastatic                        esophageal cancer and progressive dysphagia who proceeds                        to further evaluation by upper endoscopy.   Referring MD:        Kadi Dee MD   Medicines:           General Anesthesia   Complications:       No immediate complications.   _______________________________________________________________________________   Procedure:           Pre-Anesthesia Assessment:                        - Prior to the procedure, a History and Physical was                        performed, and patient medications and allergies were                        reviewed. The patient is competent. The risks and                        benefits of the procedure and the sedation options and                        risks were discussed with the patient. All questions                        were answered and informed consent was obtained. Patient                        identification and proposed procedure were verified by                        the nurse in the pre-procedure area. Mental  Status                        Examination: alert and oriented. Airway Examination:                        Mallampati Class II (the uvula but not tonsillar pillars                        visualized). Respiratory Examination: clear to                        auscultation. CV Examination: systolic murmur. ASA Grade                        Assessment: III - A patient with severe systemic                        disease. After reviewing the risks and benefits, the                        patient was deemed in satisfactory condition to undergo                        the procedure. The anesthesia plan was to use general                        anesthesia. Immediately prior to administration of                        medications, the patient was re-assessed for adequacy to                        receive sedatives. The heart rate, respiratory rate,                        oxygen saturations, blood pressure, adequacy of                        pulmonary ventilation, and response to care were                        monitored throughout the procedure. The physical status                        of the patient was re-assessed after the procedure.                        After obtaining informed consent, the endoscope was                        passed under direct vision. Throughout the procedure,                        the patient's blood pressure, pulse, and oxygen                        saturations were monitored continuously. The endoscope                        was introduced through the mouth, and advanced to the                        second part of duodenum. The upper GI endoscopy was                        accomplished without difficulty. The patient tolerated                        the procedure well.                                                                                     Findings:        The patient was supine and fluoroscopy used for adjunct imaging        throughout.  films were unremarkable. The mucosa of  the proximal,        mid and distal esophagus were unremarkable, however there was suggestion        of mild extrinsic compression of the distal esophagus. The        squamocolumnar line was found at the gastroesophageal junction and here        there was a small nonobstructing Desire IIA on the right wall. No        significant malignant tissues were demonstated. There was semisolid        contents in the fundus and the bulb, otherwise the stomach and proximal        duodenum were unremarkable. An esophageal stent was deemed inappropriate        at this juncture.                                                                                     Impression:          - Small, nonobstructing Desire IIa lesion at the GEJ                        - Subtle suggestion of mild extrinsic compression of the                        distal esophagus                        - Esophageal stent deemed inappropriate at this juncture                        - Suggestion of gastroparesis if the patient truly fasted   Recommendation:      - Standard outpatient general anesthesia recovery with                        probable discharge home this evening                        - Follow up with Dr Dee as previously scheduled; with                        continued dysphagia without invsive esophageal mass or                        overt esophageal stenosis, a gastrostomy tube would be                        recommended                        - The findings and recommendations were discussed with                        the patient and their family                                                                                       electronically signed by SUPRIYA Joel

## 2018-10-12 ENCOUNTER — ONCOLOGY VISIT (OUTPATIENT)
Dept: ONCOLOGY | Facility: CLINIC | Age: 58
End: 2018-10-12
Attending: INTERNAL MEDICINE
Payer: COMMERCIAL

## 2018-10-12 VITALS
OXYGEN SATURATION: 97 % | TEMPERATURE: 97.8 F | RESPIRATION RATE: 16 BRPM | DIASTOLIC BLOOD PRESSURE: 86 MMHG | HEART RATE: 86 BPM | HEIGHT: 78 IN | SYSTOLIC BLOOD PRESSURE: 130 MMHG | WEIGHT: 315 LBS | BODY MASS INDEX: 36.45 KG/M2

## 2018-10-12 DIAGNOSIS — C79.9 METASTASIS FROM GASTRIC CANCER (H): ICD-10-CM

## 2018-10-12 DIAGNOSIS — C15.5 CANCER OF DISTAL THIRD OF ESOPHAGUS (H): ICD-10-CM

## 2018-10-12 DIAGNOSIS — I15.8 OTHER SECONDARY HYPERTENSION: ICD-10-CM

## 2018-10-12 DIAGNOSIS — C16.9 METASTASIS FROM GASTRIC CANCER (H): ICD-10-CM

## 2018-10-12 DIAGNOSIS — K31.84 GASTROPARESIS: Primary | ICD-10-CM

## 2018-10-12 PROCEDURE — 99215 OFFICE O/P EST HI 40 MIN: CPT | Mod: ZP | Performed by: INTERNAL MEDICINE

## 2018-10-12 PROCEDURE — G0463 HOSPITAL OUTPT CLINIC VISIT: HCPCS | Mod: ZF

## 2018-10-12 RX ORDER — ZOLPIDEM TARTRATE 10 MG/1
10 TABLET ORAL
Qty: 60 TABLET | Refills: 1
Start: 2018-10-12 | End: 2018-11-13

## 2018-10-12 RX ORDER — METOCLOPRAMIDE 10 MG/1
10 TABLET ORAL 3 TIMES DAILY
Qty: 120 TABLET | Refills: 3 | Status: SHIPPED | OUTPATIENT
Start: 2018-10-12 | End: 2019-03-19

## 2018-10-12 ASSESSMENT — PAIN SCALES - GENERAL: PAINLEVEL: NO PAIN (0)

## 2018-10-12 NOTE — LETTER
10/12/2018       RE: Reuben Padilla  [address] [place] [id number]     Dear Colleague,    Thank you for referring your patient, Reuben Padilla, to the [facility]. Please see a copy of my visit note below.    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 His tumor is positive for cytokeratin 20, negative for P63 and CK7, and HER2 is negative. He started on FOLFOX (5FU/oxaliplatin) on 5/15/2017. He had a delay in treatment from 9/5-10/2/17 due to work related injury.    He had an excellent response by imaging throughout the summer 2017.    He a mixed response by imaging in late fall 2017.    In January 2018, he was switched to taxol and cyramza - had slight progression and neuropathy and clinical trial became available.       In March 2018, he was started on the [facility] Match Clinical Trial with crizotinib.  (MET amplification) He remained on crizotinib from March - July 2018.    August 2018, he was switched back to taxol/cramza.    INTERVAL HISTORY: Levi comes in today for followup.  I saw Levi for followup approximately 10 days ago.  At that time, he complained of dysphagia.  We discussed rescreening, as well as setting him up for endoscopy with possible stent placement.  He returns today for followup after these procedures.      He reports that his dysphagia will occur intermittently when eating anything such as steak or large pieces of bread.  He oftentimes feels like they get stuck.  Periodically he will have vomiting associated with this.  Other times he reports that food is able to go down without any difficulty.      No fevers or chills.  No chest pain or shortness of breath.  His energy levels are good.  His has an ECOG performance status of 1.  No problems with  "balance or falls.  No numbness or tingling in his fingertips.  He does have tingling in his toes bilaterally.  This has improved significantly and is now only to his ankles.  His 10-point review of systems is otherwise negative.         PHYSICAL EXAMINATION  /86  Pulse 86  Temp 97.8  F (36.6  C) (Oral)  Resp 16  Ht 1.981 m (6' 6\")  Wt (!) 163.8 kg (361 lb 1 oz)  SpO2 97%  BMI 41.72 kg/m2   Wt Readings from Last 4 Encounters:   10/12/18 (!) 163.8 kg (361 lb 1 oz)   10/10/18 (!) 163.8 kg (361 lb 1.8 oz)   10/02/18 (!) 162 kg (357 lb 4 oz)   09/04/18 (!) 172.3 kg (379 lb 12.8 oz)     Constitutional: Alert, oriented male in no visible distress.  Eyes: PERRL. Anicteric sclerae.  ENT/Mouth: OM moist and pink without lesions or thrush.  CV: RRR  Resp: CTAB throughout  Abdomen: Soft, non-tender, non-distended. Obese. Bowel sounds present. Unable to palpate liver or spleen.   Extremities: trace edema   Skin: Warm, dry.   Lymph: No cervical or supraclavicular lymphadenopathy appreciated.   Neuro: CN II-XII grossly intact.       ECOG PS: 1    Lab Results   Component Value Date    WBC 4.0 10/10/2018     Lab Results   Component Value Date    RBC 3.89 10/10/2018     Lab Results   Component Value Date    HGB 11.4 10/10/2018     Lab Results   Component Value Date    HCT 35.6 10/10/2018     No components found for: MCT  Lab Results   Component Value Date    MCV 92 10/10/2018     Lab Results   Component Value Date    MCH 29.3 10/10/2018     Lab Results   Component Value Date    MCHC 32.0 10/10/2018     Lab Results   Component Value Date    RDW 16.0 10/10/2018     Lab Results   Component Value Date     10/10/2018       Recent Labs   Lab Test  10/10/18   1449  07/24/18   1516   NA  142  140   POTASSIUM  3.9  4.2   CHLORIDE  110*  108   CO2  25  26   ANIONGAP  7  6   GLC  92  100*   BUN  10  10   CR  0.76  0.91   NIDHI  8.4*  8.4*     Liver Function Studies -   Recent Labs   Lab Test  10/10/18   1449   PROTTOTAL  6.8 "   ALBUMIN  3.4   BILITOTAL  0.5   ALKPHOS  76   AST  10   ALT  18     I personally reviewed with CT CAP and endoscopy.    IMPRESSION/PLAN:  1. Metastatic esophogeal adenocarcinoma. He has had treatment with FOLFOX and then transitioned to Taxol with ramicurimab.  He was then on crizotinib on the [facility] Match study.  He progressed on this and returned on taxol/cyramza.     I reviewed with Levi and his sister today that overall he has slight progression by his CT imaging.  We went back and looked at his imaging back through July when he initially started on Taxol and Cyramza, and overall, this appears stable.  However, he is having worsening overall symptoms of extrinsic compression of his mass on his esophagus.  As a result, we discussed switching his therapy to Keytruda.  He has low positive PD-L1 status.  We discussed he could be a candidate for Keytruda.  This was previously approved by his insurance.  This should be administered IV every 3 weeks.  We would plan to reimage him after 3 or 4 infusions depending on how he is doing.      He understands all of our therapy is palliative.  He has really struggled with this as he thinks about end-of-life care.  I encouraged him to make an appointment with our Palliative Care colleagues.      Edema is stable.      A history of PE.  He is on Lovenox twice daily.  He has progressed through Xarelto in the past.      He does have baseline neuropathy and is on gabapentin 600/300/600 mg daily.  We have discussed using a gabapentin gel for his feet bilaterally.      He did develop hypertension from his Cyramza as well as proteinuria.  I discussed as we discontinue this, he may need reduction in his blood pressure medication.      Dysphagia with associated gastroparesis by endoscopy.  I discussed a trial of Reglan.  If he does not notice improvement in this, he may need a PEG tube placed.  He understands this.  He is hoping that this is not the case.        He understands his  therapies are not curative intent but rather palliative.  Additional supportive care measures are discussed.  He has an appointment with me in 2 weeks to begin this therapy.    Kadi Dee    Again, thank you for allowing me to participate in the care of your patient.      Sincerely,    Kadi Dee MD

## 2018-10-12 NOTE — PROGRESS NOTES
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 His tumor is positive for cytokeratin 20, negative for P63 and CK7, and HER2 is negative. He started on FOLFOX (5FU/oxaliplatin) on 5/15/2017. He had a delay in treatment from 9/5-10/2/17 due to work related injury.    He had an excellent response by imaging throughout the summer 2017.    He a mixed response by imaging in late fall 2017.    In January 2018, he was switched to taxol and cyramza - had slight progression and neuropathy and clinical trial became available.       In March 2018, he was started on the [facility] Clinical Trial with crizotinib.  (MET amplification) He remained on crizotinib from March - July 2018.    August 2018, he was switched back to taxol/cramza.    INTERVAL HISTORY: Levi comes in today for followup.  I saw Levi for followup approximately 10 days ago.  At that time, he complained of dysphagia.  We discussed rescreening, as well as setting him up for endoscopy with possible stent placement.  He returns today for followup after these procedures.      He reports that his dysphagia will occur intermittently when eating anything such as steak or large pieces of bread.  He oftentimes feels like they get stuck.  Periodically he will have vomiting associated with this.  Other times he reports that food is able to go down without any difficulty.      No fevers or chills.  No chest pain or shortness of breath.  His energy levels are good.  His has an ECOG performance status of 1.  No problems with balance or falls.  No numbness or tingling in his fingertips.  He does have tingling in his toes bilaterally.  This has improved significantly and is now only to his ankles.  His 10-point review of systems is otherwise negative.        "  PHYSICAL EXAMINATION  /86  Pulse 86  Temp 97.8  F (36.6  C) (Oral)  Resp 16  Ht 1.981 m (6' 6\")  Wt (!) 163.8 kg (361 lb 1 oz)  SpO2 97%  BMI 41.72 kg/m2   Wt Readings from Last 4 Encounters:   10/12/18 (!) 163.8 kg (361 lb 1 oz)   10/10/18 (!) 163.8 kg (361 lb 1.8 oz)   10/02/18 (!) 162 kg (357 lb 4 oz)   09/04/18 (!) 172.3 kg (379 lb 12.8 oz)     Constitutional: Alert, oriented male in no visible distress.  Eyes: PERRL. Anicteric sclerae.  ENT/Mouth: OM moist and pink without lesions or thrush.  CV: RRR  Resp: CTAB throughout  Abdomen: Soft, non-tender, non-distended. Obese. Bowel sounds present. Unable to palpate liver or spleen.   Extremities: trace edema   Skin: Warm, dry.   Lymph: No cervical or supraclavicular lymphadenopathy appreciated.   Neuro: CN II-XII grossly intact.       ECOG PS: 1    Lab Results   Component Value Date    WBC 4.0 10/10/2018     Lab Results   Component Value Date    RBC 3.89 10/10/2018     Lab Results   Component Value Date    HGB 11.4 10/10/2018     Lab Results   Component Value Date    HCT 35.6 10/10/2018     No components found for: MCT  Lab Results   Component Value Date    MCV 92 10/10/2018     Lab Results   Component Value Date    MCH 29.3 10/10/2018     Lab Results   Component Value Date    MCHC 32.0 10/10/2018     Lab Results   Component Value Date    RDW 16.0 10/10/2018     Lab Results   Component Value Date     10/10/2018       Recent Labs   Lab Test  10/10/18   1449  07/24/18   1516   NA  142  140   POTASSIUM  3.9  4.2   CHLORIDE  110*  108   CO2  25  26   ANIONGAP  7  6   GLC  92  100*   BUN  10  10   CR  0.76  0.91   NIDHI  8.4*  8.4*     Liver Function Studies -   Recent Labs   Lab Test  10/10/18   1449   PROTTOTAL  6.8   ALBUMIN  3.4   BILITOTAL  0.5   ALKPHOS  76   AST  10   ALT  18     I personally reviewed with CT CAP and endoscopy.    IMPRESSION/PLAN:  1. Metastatic esophogeal adenocarcinoma. He has had treatment with FOLFOX and then transitioned " to Taxol with ramicurimab.  He was then on crizotinib on the [facility] Match study.  He progressed on this and returned on taxol/cyramza.     I reviewed with Levi and his sister today that overall he has slight progression by his CT imaging.  We went back and looked at his imaging back through July when he initially started on Taxol and Cyramza, and overall, this appears stable.  However, he is having worsening overall symptoms of extrinsic compression of his mass on his esophagus.  As a result, we discussed switching his therapy to Keytruda.  He has low positive PD-L1 status.  We discussed he could be a candidate for Keytruda.  This was previously approved by his insurance.  This should be administered IV every 3 weeks.  We would plan to reimage him after 3 or 4 infusions depending on how he is doing.      He understands all of our therapy is palliative.  He has really struggled with this as he thinks about end-of-life care.  I encouraged him to make an appointment with our Palliative Care colleagues.      Edema is stable.      A history of PE.  He is on Lovenox twice daily.  He has progressed through Xarelto in the past.      He does have baseline neuropathy and is on gabapentin 600/300/600 mg daily.  We have discussed using a gabapentin gel for his feet bilaterally.      He did develop hypertension from his Cyramza as well as proteinuria.  I discussed as we discontinue this, he may need reduction in his blood pressure medication.      Dysphagia with associated gastroparesis by endoscopy.  I discussed a trial of Reglan.  If he does not notice improvement in this, he may need a PEG tube placed.  He understands this.  He is hoping that this is not the case.        He understands his therapies are not curative intent but rather palliative.  Additional supportive care measures are discussed.  He has an appointment with me in 2 weeks to begin this therapy.    Kadi Dee

## 2018-10-12 NOTE — NURSING NOTE
"Oncology Rooming Note    October 12, 2018 9:36 AM   Reuben Padilla is a 58 year old male who presents for:    Chief Complaint   Patient presents with     Oncology Clinic Visit     Return: Esophageal CA     Initial Vitals: BP (!) 153/101  Pulse 107  Temp 97.8  F (36.6  C) (Oral)  Resp 16  Ht 1.981 m (6' 6\")  Wt (!) 163.8 kg (361 lb 1 oz)  SpO2 97%  BMI 41.72 kg/m2 Estimated body mass index is 41.72 kg/(m^2) as calculated from the following:    Height as of this encounter: 1.981 m (6' 6\").    Weight as of this encounter: 163.8 kg (361 lb 1 oz). Body surface area is 3 meters squared.  No Pain (0) Comment: Data Unavailable   No LMP for male patient.  Allergies reviewed: Yes  Medications reviewed: Yes    Medications: Medication refills not needed today.  Pharmacy name entered into [employer]:    [id number] - [place], [place] - [address] [place] [address] AT INTERSECTION [address] [place] [address] PHARMACY [place], [place] - [address] [place] [address] [phone]  [id number] - [place], [place] - [address]    Clinical concerns: no new concerns today, patient needs refills, but he only know the Rx numbers not the names. Dr. Dee was notified.    10 minutes for nursing intake (face to face time)     Isis Calhoun CMA              "

## 2018-10-12 NOTE — MR AVS SNAPSHOT
After Visit Summary   10/12/2018    Reuben Padilla    MRN: [id number]           Patient Information     Date Of Birth          1960        Visit Information        Provider Department      10/12/2018 9:30 AM Kadi Dee MD [facility]        Today's Diagnoses     Gastroparesis    -  1    Cancer of distal third of esophagus (H)        Other secondary hypertension           Follow-ups after your visit        Your next 10 appointments already scheduled     Oct 23, 2018 11:30 AM CDT   [facility] Draw with [facility] LAB DRAW   [facility] Lab Draw ([facility])    [address] [place] [address]  [address]  [facility] [id number] [phone]            Oct 23, 2018 12:00 PM CDT   (Arrive by 11:45 AM)   Return Visit with Kadi Dee MD   [facility] ([facility])    [address] [place] [address]  [address]  [facility] [id number] [phone]            Oct 23, 2018  1:30 PM CDT   Infusion 120 with  ONCOLOGY INFUSION, UC 22 ATC   [facility] ([facility])    [address] [place] [address]  [address]  [facility] [id number] [phone]              Who to contact     If you have questions or need follow up information about today's clinic visit or your schedule please contact [facility] directly at [phone].  Normal or non-critical lab and imaging results will be communicated to you by MyChart, letter or phone within 4 business days after the clinic has received the results. If you do not hear from us within 7 days, please contact the clinic through MyChart or phone. If you have a critical or abnormal lab result, we will notify you by phone as soon as possible.  Submit refill requests through [employer] or call your pharmacy and they will forward the refill request to us. Please allow 3 business days for your refill to be completed.           "Additional Information About Your Visit        [employer] Information     [employer] gives you secure access to your electronic health record. If you see a primary care provider, you can also send messages to your care team and make appointments. If you have questions, please call your primary care clinic.  If you do not have a primary care provider, please call [phone] and they will assist you.        Care EveryWhere ID     This is your Care EveryWhere ID. This could be used by other organizations to access your [facility] medical records  [id number]        Your Vitals Were     Pulse Temperature Respirations Height Pulse Oximetry BMI (Body Mass Index)    86 97.8  F (36.6  C) (Oral) 16 1.981 m (6' 6\") 97% 41.72 kg/m2       Blood Pressure from Last 3 Encounters:   10/12/18 130/86   10/10/18 (!) 144/107   10/02/18 139/87    Weight from Last 3 Encounters:   10/12/18 (!) 163.8 kg (361 lb 1 oz)   10/10/18 (!) 163.8 kg (361 lb 1.8 oz)   10/02/18 (!) 162 kg (357 lb 4 oz)              Today, you had the following     No orders found for display         Today's Medication Changes          These changes are accurate as of 10/12/18 11:59 PM.  If you have any questions, ask your nurse or doctor.               Start taking these medicines.        Dose/Directions    metoclopramide 10 MG tablet   Commonly known as:  REGLAN   Used for:  Gastroparesis   Started by:  Kadi Dee MD        Dose:  10 mg   Take 1 tablet (10 mg) by mouth 3 times daily   Quantity:  120 tablet   Refills:  3         These medicines have changed or have updated prescriptions.        Dose/Directions    gabapentin 300 MG capsule   Commonly known as:  NEURONTIN   This may have changed:    - how much to take  - when to take this  - additional instructions   Used for:  Chemotherapy-induced neuropathy (H)        Two tablets in the am and one tablet in the afternoon and evening   Quantity:  180 capsule   Refills:  1            Where to get your medicines    "   These medications were sent to [id number] - [place], [place] - [address]  [address], [place] [address]     Phone:  [phone]     metoclopramide 10 MG tablet         Some of these will need a paper prescription and others can be bought over the counter.  Ask your nurse if you have questions.     Bring a paper prescription for each of these medications     ketamine 5% gabapentin 8% lidocaine 2.5% topical PLO cream       You don't need a prescription for these medications     zolpidem 10 MG tablet                Primary Care Provider Fax #    Physician No Ref-Primary [phone]       No address on file        Equal Access to Services     [facility]ITOS [facility] : Hadshahana Dunlap, waomer davis, nancy kaalmajuancarlos sotelo, geraldo washington . So [facility] [phone].    ATENCIÓN: Si habla español, tiene a alamo disposición servicios gratuitos de asistencia lingüística. [facility] [phone].    We comply with applicable federal civil rights laws and [place] laws. We do not discriminate on the basis of race, color, national origin, age, disability, sex, sexual orientation, or gender identity.            Thank you!     Thank you for choosing [facility]  for your care. Our goal is always to provide you with excellent care. Hearing back from our patients is one way we can continue to improve our services. Please take a few minutes to complete the written survey that you may receive in the mail after your visit with us. Thank you!             Your Updated Medication List - Protect others around you: Learn how to safely use, store and throw away your medicines at www.disposemymeds.org.          This list is accurate as of 10/12/18 11:59 PM.  Always use your most recent med list.                   Brand Name Dispense Instructions for use Diagnosis    amLODIPine 5 MG tablet    NORVASC    30 tablet    Take 2 tablets (10 mg) by mouth daily    Essential  hypertension       diphenoxylate-atropine 2.5-0.025 MG per tablet    LOMOTIL    40 tablet    Take 1 tablet by mouth 4 times daily as needed for diarrhea    Diarrhea, unspecified type       * enoxaparin 80 MG/0.8ML injection    LOVENOX    60 Syringe    Inject 1.3 mLs (130 mg) Subcutaneous 2 times daily    Cancer of distal third of esophagus (H)       * enoxaparin 150 MG/ML injection    LOVENOX    60 mL    INJECT 0.86ML (130MG) TWICE A DAY FOR ACUTE PULMONARY EMBOLISM AND ESOPHAGEAL MALIGNANCY    Hx of pulmonary embolus       fish oil-omega-3 fatty acids 1000 MG capsule      Take 2 g by mouth daily        gabapentin 300 MG capsule    NEURONTIN    180 capsule    Two tablets in the am and one tablet in the afternoon and evening    Chemotherapy-induced neuropathy (H)       ketamine 5% gabapentin 8% lidocaine 2.5% topical PLO cream     30 g    Use small amount topically to feet three times daily    Cancer of distal third of esophagus (H), Other secondary hypertension       latanoprost 0.005 % ophthalmic solution    XALATAN    1 Bottle    Place 1 drop into both eyes At Bedtime    Cancer of distal third of esophagus (H)       lidocaine-prilocaine cream    EMLA    30 g    Apply topically as needed for moderate pain    Cancer of distal third of esophagus (H)       lisinopril 40 MG tablet    PRINIVIL/ZESTRIL     Take 40 mg by mouth daily        * LORazepam 0.5 MG tablet    ATIVAN    30 tablet    Take 1 tablet (0.5 mg) by mouth every 4 hours as needed (Anxiety, Nausea/Vomiting or Sleep)    Cancer of distal third of esophagus (H)       * LORazepam 0.5 MG tablet    ATIVAN    30 tablet    Take 1 tablet (0.5 mg) by mouth every 4 hours as needed (Anxiety, Nausea/Vomiting or Sleep)    Cancer of distal third of esophagus (H)       metoclopramide 10 MG tablet    REGLAN    120 tablet    Take 1 tablet (10 mg) by mouth 3 times daily    Gastroparesis       Multi-vitamin Tabs tablet      Take 1 tablet by mouth daily        omeprazole 40 MG  capsule    priLOSEC    90 capsule    Take 1 capsule (40 mg) by mouth daily    Cancer of distal third of esophagus (H)       ondansetron 8 MG tablet    ZOFRAN    30 tablet    Take 1 tablet (8 mg) by mouth every 8 hours as needed (Nausea/Vomiting)    Cancer of distal third of esophagus (H)       prochlorperazine 10 MG tablet    COMPAZINE    30 tablet    Take 1 tablet (10 mg) by mouth every 6 hours as needed (Nausea/Vomiting)    Cancer of distal third of esophagus (H)       tadalafil 5 MG tablet    CIALIS    20 tablet    Take 2 tablets (10 mg) by mouth every 24 hours As needed for sexual activity    Malignant neoplasm of lower third of esophagus (H), Drug-induced erectile dysfunction       timolol 0.5 % ophthalmic solution    TIMOPTIC     Place into both eyes daily    Cancer of distal third of esophagus (H)       zolpidem 10 MG tablet    AMBIEN    60 tablet    Take 1 tablet (10 mg) by mouth nightly as needed    Metastasis from gastric cancer (H)       * Notice:  This list has 4 medication(s) that are the same as other medications prescribed for you. Read the directions carefully, and ask your doctor or other care provider to review them with you.

## 2018-10-15 DIAGNOSIS — I10 ESSENTIAL HYPERTENSION: ICD-10-CM

## 2018-10-15 RX ORDER — AMLODIPINE BESYLATE 5 MG/1
10 TABLET ORAL DAILY
Qty: 60 TABLET | Refills: 0
Start: 2018-10-15 | End: 2018-10-31 | Stop reason: DRUGHIGH

## 2018-10-16 ENCOUNTER — TELEPHONE (OUTPATIENT)
Dept: ONCOLOGY | Facility: CLINIC | Age: 58
End: 2018-10-16

## 2018-10-16 NOTE — TELEPHONE ENCOUNTER
A prior authorization is needed for the following medication prescribed.  Please complete a prior authorization with the information included below.    Medication:Ketamine 5% / Julieta 8% / Lido 2.5 % PLO Cream    Ingredients                                                                   NDCs                             Ketamine HCL POWDER                                    [address]-05                 Main Active Ingredient  Lidocaine HCL POWDER                                    [address]-76                                   Gabapentin POWDER                                          [address]-18           Salt Stable LS ADV Cream                                   [address]-73        RX #:[id number]  Reason for Rejection:COMPOUND PLAN EXCLUSION      Pharmacy Insurance plan:[employer] IFB  BIN #:[id number]  ID #:[id number]  PCN #:[id number]  Phone #:[phone]      Pharmacy NPI:[id number]      Please advise the pharmacy when the prior authorization is approved or denied.     Thank you for your time.    Cj Mcclain  Compounding Pharmacy Technician  [facility]   [address] [place] [address]   Phone: [phone]  Fax: [phone]

## 2018-10-17 NOTE — TELEPHONE ENCOUNTER
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  Central Prior Authorization Team   Phone: [phone]      PA Initiation    Medication: Ketamine 5% / Julieta 8% / Lido 2.5 % PLO Cream  Insurance Company:    Pharmacy Filling the Rx:    Filling Pharmacy Phone:    Filling Pharmacy Fax:    Start Date:

## 2018-10-18 NOTE — TELEPHONE ENCOUNTER
PRIOR AUTHORIZATION DENIED    Medication: Ketamine 5% / Julieta 8% / Lido 2.5 % PLO Cream-PA denied    Denial Date: 10/17/2018    Denial Rational:        Appeal Information:

## 2018-10-23 ENCOUNTER — INFUSION THERAPY VISIT (OUTPATIENT)
Dept: ONCOLOGY | Facility: CLINIC | Age: 58
End: 2018-10-23
Attending: INTERNAL MEDICINE
Payer: COMMERCIAL

## 2018-10-23 ENCOUNTER — APPOINTMENT (OUTPATIENT)
Dept: LAB | Facility: CLINIC | Age: 58
End: 2018-10-23
Attending: INTERNAL MEDICINE
Payer: COMMERCIAL

## 2018-10-23 VITALS
DIASTOLIC BLOOD PRESSURE: 95 MMHG | TEMPERATURE: 97.4 F | HEART RATE: 96 BPM | OXYGEN SATURATION: 97 % | RESPIRATION RATE: 20 BRPM | WEIGHT: 315 LBS | BODY MASS INDEX: 36.45 KG/M2 | HEIGHT: 78 IN | SYSTOLIC BLOOD PRESSURE: 138 MMHG

## 2018-10-23 DIAGNOSIS — C15.5 CANCER OF DISTAL THIRD OF ESOPHAGUS (H): Primary | ICD-10-CM

## 2018-10-23 LAB
ALBUMIN SERPL-MCNC: 3.5 G/DL (ref 3.4–5)
ALP SERPL-CCNC: 79 U/L (ref 40–150)
ALT SERPL W P-5'-P-CCNC: 20 U/L (ref 0–70)
AST SERPL W P-5'-P-CCNC: 15 U/L (ref 0–45)
BASOPHILS # BLD AUTO: 0 10E9/L (ref 0–0.2)
BASOPHILS NFR BLD AUTO: 0.6 %
BILIRUB DIRECT SERPL-MCNC: 0.2 MG/DL (ref 0–0.2)
BILIRUB SERPL-MCNC: 0.6 MG/DL (ref 0.2–1.3)
CREAT SERPL-MCNC: 0.89 MG/DL (ref 0.66–1.25)
DIFFERENTIAL METHOD BLD: ABNORMAL
EOSINOPHIL # BLD AUTO: 0 10E9/L (ref 0–0.7)
EOSINOPHIL NFR BLD AUTO: 1.2 %
ERYTHROCYTE [DISTWIDTH] IN BLOOD BY AUTOMATED COUNT: 15.9 % (ref 10–15)
GFR SERPL CREATININE-BSD FRML MDRD: 88 ML/MIN/1.7M2
HCT VFR BLD AUTO: 37.8 % (ref 40–53)
HGB BLD-MCNC: 11.8 G/DL (ref 13.3–17.7)
IMM GRANULOCYTES # BLD: 0 10E9/L (ref 0–0.4)
IMM GRANULOCYTES NFR BLD: 0.3 %
LYMPHOCYTES # BLD AUTO: 0.9 10E9/L (ref 0.8–5.3)
LYMPHOCYTES NFR BLD AUTO: 27.7 %
MCH RBC QN AUTO: 28.6 PG (ref 26.5–33)
MCHC RBC AUTO-ENTMCNC: 31.2 G/DL (ref 31.5–36.5)
MCV RBC AUTO: 92 FL (ref 78–100)
MONOCYTES # BLD AUTO: 0.2 10E9/L (ref 0–1.3)
MONOCYTES NFR BLD AUTO: 6.7 %
NEUTROPHILS # BLD AUTO: 2.1 10E9/L (ref 1.6–8.3)
NEUTROPHILS NFR BLD AUTO: 63.5 %
NRBC # BLD AUTO: 0 10*3/UL
NRBC BLD AUTO-RTO: 0 /100
PLATELET # BLD AUTO: 150 10E9/L (ref 150–450)
PREALB SERPL IA-MCNC: 13 MG/DL (ref 15–45)
PROT SERPL-MCNC: 7.1 G/DL (ref 6.8–8.8)
RBC # BLD AUTO: 4.13 10E12/L (ref 4.4–5.9)
TSH SERPL DL<=0.005 MIU/L-ACNC: 3.04 MU/L (ref 0.4–4)
WBC # BLD AUTO: 3.3 10E9/L (ref 4–11)

## 2018-10-23 PROCEDURE — 84134 ASSAY OF PREALBUMIN: CPT | Performed by: INTERNAL MEDICINE

## 2018-10-23 PROCEDURE — 93010 ELECTROCARDIOGRAM REPORT: CPT | Performed by: INTERNAL MEDICINE

## 2018-10-23 PROCEDURE — 82565 ASSAY OF CREATININE: CPT | Performed by: INTERNAL MEDICINE

## 2018-10-23 PROCEDURE — 85025 COMPLETE CBC W/AUTO DIFF WBC: CPT | Performed by: INTERNAL MEDICINE

## 2018-10-23 PROCEDURE — 80076 HEPATIC FUNCTION PANEL: CPT | Performed by: INTERNAL MEDICINE

## 2018-10-23 PROCEDURE — 96413 CHEMO IV INFUSION 1 HR: CPT

## 2018-10-23 PROCEDURE — G0463 HOSPITAL OUTPT CLINIC VISIT: HCPCS | Mod: ZF

## 2018-10-23 PROCEDURE — 99215 OFFICE O/P EST HI 40 MIN: CPT | Mod: ZP | Performed by: INTERNAL MEDICINE

## 2018-10-23 PROCEDURE — 93005 ELECTROCARDIOGRAM TRACING: CPT

## 2018-10-23 PROCEDURE — 25000128 H RX IP 250 OP 636: Mod: ZF | Performed by: INTERNAL MEDICINE

## 2018-10-23 PROCEDURE — 84443 ASSAY THYROID STIM HORMONE: CPT | Performed by: INTERNAL MEDICINE

## 2018-10-23 RX ORDER — METHYLPREDNISOLONE SODIUM SUCCINATE 125 MG/2ML
125 INJECTION, POWDER, LYOPHILIZED, FOR SOLUTION INTRAMUSCULAR; INTRAVENOUS
Status: CANCELLED
Start: 2018-10-23

## 2018-10-23 RX ORDER — HEPARIN SODIUM (PORCINE) LOCK FLUSH IV SOLN 100 UNIT/ML 100 UNIT/ML
5 SOLUTION INTRAVENOUS EVERY 8 HOURS
Status: DISCONTINUED | OUTPATIENT
Start: 2018-10-23 | End: 2018-10-27 | Stop reason: HOSPADM

## 2018-10-23 RX ORDER — EPINEPHRINE 0.3 MG/.3ML
0.3 INJECTION SUBCUTANEOUS EVERY 5 MIN PRN
Status: CANCELLED | OUTPATIENT
Start: 2018-10-23

## 2018-10-23 RX ORDER — PROCHLORPERAZINE MALEATE 10 MG
10 TABLET ORAL EVERY 6 HOURS PRN
Qty: 30 TABLET | Refills: 2 | Status: SHIPPED | OUTPATIENT
Start: 2018-10-23 | End: 2019-01-16

## 2018-10-23 RX ORDER — ALBUTEROL SULFATE 90 UG/1
1-2 AEROSOL, METERED RESPIRATORY (INHALATION)
Status: CANCELLED
Start: 2018-10-23

## 2018-10-23 RX ORDER — AMLODIPINE BESYLATE 10 MG/1
10 TABLET ORAL DAILY
Refills: 0 | COMMUNITY
Start: 2018-10-15 | End: 2018-10-31 | Stop reason: DRUGHIGH

## 2018-10-23 RX ORDER — ALBUTEROL SULFATE 0.83 MG/ML
2.5 SOLUTION RESPIRATORY (INHALATION)
Status: CANCELLED | OUTPATIENT
Start: 2018-10-23

## 2018-10-23 RX ORDER — HEPARIN SODIUM (PORCINE) LOCK FLUSH IV SOLN 100 UNIT/ML 100 UNIT/ML
500 SOLUTION INTRAVENOUS ONCE
Status: COMPLETED | OUTPATIENT
Start: 2018-10-23 | End: 2018-10-23

## 2018-10-23 RX ORDER — EPINEPHRINE 1 MG/ML
0.3 INJECTION, SOLUTION INTRAMUSCULAR; SUBCUTANEOUS EVERY 5 MIN PRN
Status: CANCELLED | OUTPATIENT
Start: 2018-10-23

## 2018-10-23 RX ORDER — DIPHENHYDRAMINE HYDROCHLORIDE 50 MG/ML
50 INJECTION INTRAMUSCULAR; INTRAVENOUS
Status: CANCELLED
Start: 2018-10-23

## 2018-10-23 RX ORDER — MEPERIDINE HYDROCHLORIDE 25 MG/ML
25 INJECTION INTRAMUSCULAR; INTRAVENOUS; SUBCUTANEOUS EVERY 30 MIN PRN
Status: CANCELLED | OUTPATIENT
Start: 2018-10-23

## 2018-10-23 RX ORDER — LORAZEPAM 0.5 MG/1
0.5 TABLET ORAL EVERY 4 HOURS PRN
Qty: 30 TABLET | Refills: 2 | Status: ON HOLD | OUTPATIENT
Start: 2018-10-23 | End: 2018-11-07

## 2018-10-23 RX ORDER — LORAZEPAM 2 MG/ML
0.5 INJECTION INTRAMUSCULAR EVERY 4 HOURS PRN
Status: CANCELLED
Start: 2018-10-23

## 2018-10-23 RX ORDER — SODIUM CHLORIDE 9 MG/ML
1000 INJECTION, SOLUTION INTRAVENOUS CONTINUOUS PRN
Status: CANCELLED
Start: 2018-10-23

## 2018-10-23 RX ADMIN — Medication 5 ML: at 11:48

## 2018-10-23 RX ADMIN — Medication 500 UNITS: at 15:11

## 2018-10-23 RX ADMIN — SODIUM CHLORIDE 250 ML: 9 INJECTION, SOLUTION INTRAVENOUS at 14:31

## 2018-10-23 RX ADMIN — SODIUM CHLORIDE 200 MG: 9 INJECTION, SOLUTION INTRAVENOUS at 14:31

## 2018-10-23 ASSESSMENT — PAIN SCALES - GENERAL: PAINLEVEL: NO PAIN (0)

## 2018-10-23 NOTE — PROGRESS NOTES
Infusion Nursing Note:  Reuben Padilla presents today for Cycle 1 Day 1 Keytruda.    Patient seen by provider today: Yes: Dr. Dee   present during visit today: Not Applicable.    Note: This is a new regimen for pt. Pt has received several therapies previously. This RN provided verbal and written information to pt regarding Keytruda. Patient instructed to call triage with questions/concerns or if he/she has chills and/or temperature greater than or equal to 100.5. Triage number: [phone]; After hours, weekends, or holidays, call [phone] and ask for oncology doctor on call.      Intravenous Access:  Implanted Port.    Treatment Conditions:  Lab Results   Component Value Date    HGB 11.8 10/23/2018     Lab Results   Component Value Date    WBC 3.3 10/23/2018      Lab Results   Component Value Date    ANEU 2.1 10/23/2018     Lab Results   Component Value Date     10/23/2018      Lab Results              Lab Results   Component Value Date    CR 0.89 10/23/2018                   Lab Results                           Lab Results   Component Value Date    BILITOTAL 0.6 10/23/2018           Lab Results   Component Value Date    ALBUMIN 3.5 10/23/2018                    Lab Results   Component Value Date    ALT 20 10/23/2018           Lab Results   Component Value Date    AST 15 10/23/2018       Results reviewed, labs MET treatment parameters, ok to proceed with treatment.      Post Infusion Assessment:  Patient tolerated infusion without incident.  Blood return noted pre and post infusion.  Site patent and intact, free from redness, edema or discomfort.  No evidence of extravasations.  Access discontinued per protocol.    Discharge Plan:   Patient declined prescription refills. Pt reports having adequate supply of both Ativan and Compazine at home.   Copy of AVS reviewed with patient and/or family.  Patient will return 11/13 for next appointment.  Patient discharged in stable condition  accompanied by: family.  Departure Mode: Ambulatory.    WHIT CAMARENA RN

## 2018-10-23 NOTE — MR AVS SNAPSHOT
After Visit Summary   10/23/2018    Reuben Padilla    MRN: [id number]           Patient Information     Date Of Birth          1960        Visit Information        Provider Department      10/23/2018 12:00 PM Kadi Dee MD [facility]        Today's Diagnoses     Cancer of distal third of esophagus (H)    -  1       Follow-ups after your visit        Your next 10 appointments already scheduled     Nov 13, 2018  9:00 AM CST   [facility] Lab Draw with [facility] LAB DRAW   [facility] ([facility])    [address] [place] [address]  [address]  [facility] [address]   [phone]            Nov 13, 2018  9:30 AM CST   (Arrive by 9:15 AM)   Return Visit with Loraine Sutherland PA-C   [facility] ([facility])    [address] [place] [address]  [address]  [facility] [address]   [phone]            Nov 13, 2018 12:30 PM CST   Infusion 60 with UC ONCOLOGY INFUSION, UC 22 ATC   [facility] ([facility])    [address] [place] [address]  [address]  [facility] [address]   [phone]            Nov 20, 2018 11:00 AM CST   (Arrive by 10:45 AM)   New Patient Visit with Nieves Butcher MD   [facility] ([facility])    [address] [place] [address]  [address]  [facility] [address]   [phone]            Dec 03, 2018  8:00 AM CST   [facility] Lab Draw with [facility] LAB DRAW   [facility] ([facility])    [address] [place] [address]  [address]  [facility] [address]   [phone]            Dec 03, 2018  8:30 AM CST   (Arrive by 8:15 AM)   Return Visit with Kadi Dee MD   [facility] ([facility])    [address] [place] [address]  [address]  [facility] [address]   [phone]            Dec 03, 2018 10:00 AM CST   Infusion 60 with UC ONCOLOGY  "INFUSION, UC 29 ATC   [facility] ([facility])    [address] [facility]  [address]  [facility] [address] [phone]              Who to contact     If you have questions or need follow up information about today's clinic visit or your schedule please contact [facility] directly at [phone].  Normal or non-critical lab and imaging results will be communicated to you by Hangohart, letter or phone within 4 business days after the clinic has received the results. If you do not hear from us within 7 days, please contact the clinic through Hangohart or phone. If you have a critical or abnormal lab result, we will notify you by phone as soon as possible.  Submit refill requests through SignalFuse or call your pharmacy and they will forward the refill request to us. Please allow 3 business days for your refill to be completed.          Additional Information About Your Visit        HangoharZAPR Information     [employer]use gives you secure access to your electronic health record. If you see a primary care provider, you can also send messages to your care team and make appointments. If you have questions, please call your primary care clinic.  If you do not have a primary care provider, please call [phone] and they will assist you.        Care EveryWhere ID     This is your Care EveryWhere ID. This could be used by other organizations to access your [place] medical records  [id number]        Your Vitals Were     Pulse Temperature Respirations Height Pulse Oximetry BMI (Body Mass Index)    96 97.4  F (36.3  C) (Oral) 20 1.981 m (6' 5.99\") 97% 41.58 kg/m2       Blood Pressure from Last 3 Encounters:   10/23/18 (!) 138/95   10/12/18 130/86   10/10/18 (!) 144/107    Weight from Last 3 Encounters:   10/23/18 (!) 163.2 kg (359 lb 11.2 oz)   10/12/18 (!) 163.8 kg (361 lb 1 oz)   10/10/18 (!) 163.8 kg (361 lb 1.8 oz)              We Performed the Following     EKG " 12-lead complete w/read - Clinics          Today's Medication Changes          These changes are accurate as of 10/23/18 11:59 PM.  If you have any questions, ask your nurse or doctor.               These medicines have changed or have updated prescriptions.        Dose/Directions    gabapentin 300 MG capsule   Commonly known as:  NEURONTIN   This may have changed:    - how much to take  - when to take this  - additional instructions   Used for:  Chemotherapy-induced neuropathy (H)        Two tablets in the am and one tablet in the afternoon and evening   Quantity:  180 capsule   Refills:  1            Where to get your medicines      These medications were sent to [place], [place] - [address]  [address] [facility] [address], [facility] [address]    Hours:  TRANSPLANT PHONE NUMBER [phone] Phone:  [phone]     prochlorperazine 10 MG tablet         Some of these will need a paper prescription and others can be bought over the counter.  Ask your nurse if you have questions.     Bring a paper prescription for each of these medications     LORazepam 0.5 MG tablet                Primary Care Provider Fax #    Physician No Ref-Primary [phone]       No address on file        Equal Access to Services     CARLITOS SHARPE : Eliza Dunlap, waomer davis, qageorgina sotelo, geraldo cardenas. So [facility] [phone].    ATENCIÓN: Si habla español, tiene a alamo disposición servicios gratuitos de asistencia lingüística. Tanisha al [phone].    We comply with applicable federal civil rights laws and [place] laws. We do not discriminate on the basis of race, color, national origin, age, disability, sex, sexual orientation, or gender identity.            Thank you!     Thank you for choosing [facility]  for your care. Our goal is always to provide you with excellent care. Hearing back from our patients is  one way we can continue to improve our services. Please take a few minutes to complete the written survey that you may receive in the mail after your visit with us. Thank you!             Your Updated Medication List - Protect others around you: Learn how to safely use, store and throw away your medicines at www.disposemymeds.org.          This list is accurate as of 10/23/18 11:59 PM.  Always use your most recent med list.                   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)       * enoxaparin 150 MG/ML injection    LOVENOX    60 mL    INJECT 0.86ML (130MG) TWICE A DAY FOR ACUTE PULMONARY EMBOLISM AND ESOPHAGEAL MALIGNANCY    Hx of pulmonary embolus       fish oil-omega-3 fatty acids 1000 MG capsule      Take 2 g by mouth daily        gabapentin 300 MG capsule    NEURONTIN    180 capsule    Two tablets in the am and one tablet in the afternoon and evening    Chemotherapy-induced neuropathy (H)       ketamine 5% gabapentin 8% lidocaine 2.5% topical PLO cream     30 g    Use small amount topically to feet three times daily    Cancer of distal third of esophagus (H), Other secondary hypertension       latanoprost 0.005 % ophthalmic solution    XALATAN    1 Bottle    Place 1 drop into both eyes At Bedtime    Cancer of distal third of esophagus (H)       lidocaine-prilocaine cream    EMLA    30 g    Apply topically as needed for moderate pain     Cancer of distal third of esophagus (H)       lisinopril 40 MG tablet    PRINIVIL/ZESTRIL     Take 40 mg by mouth daily        * LORazepam 0.5 MG tablet    ATIVAN    30 tablet    Take 1 tablet (0.5 mg) by mouth every 4 hours as needed (Anxiety, Nausea/Vomiting or Sleep)    Cancer of distal third of esophagus (H)       * LORazepam 0.5 MG tablet    ATIVAN    30 tablet    Take 1 tablet (0.5 mg) by mouth every 4 hours as needed (Anxiety, Nausea/Vomiting or Sleep)    Cancer of distal third of esophagus (H)       metoclopramide 10 MG tablet    REGLAN    120 tablet    Take 1 tablet (10 mg) by mouth 3 times daily    Gastroparesis       Multi-vitamin Tabs tablet      Take 1 tablet by mouth daily        omeprazole 40 MG capsule    priLOSEC    90 capsule    Take 1 capsule (40 mg) by mouth daily    Cancer of distal third of esophagus (H)       ondansetron 8 MG tablet    ZOFRAN    30 tablet    Take 1 tablet (8 mg) by mouth every 8 hours as needed (Nausea/Vomiting)    Cancer of distal third of esophagus (H)       prochlorperazine 10 MG tablet    COMPAZINE    30 tablet    Take 1 tablet (10 mg) by mouth every 6 hours as needed (Nausea/Vomiting)    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 Read the directions carefully, and ask your doctor or other care provider to review them with you.

## 2018-10-23 NOTE — NURSING NOTE
"Oncology Rooming Note    October 23, 2018 12:14 PM   Reuben Padilla is a 58 year old male who presents for:    Chief Complaint   Patient presents with     Port Draw     Right port accessed with a gripper needle, labs drawn and sent, flushed with saline and heparin, vitals completed, checked into next appointment.     Oncology Clinic Visit     return - esophageal ca      Initial Vitals: BP (!) 138/95 (BP Location: Left arm, Patient Position: Sitting, Cuff Size: Adult Large)  Pulse 96  Temp 97.4  F (36.3  C) (Oral)  Resp 20  Ht 1.981 m (6' 5.99\")  Wt (!) 163.2 kg (359 lb 11.2 oz)  SpO2 97%  BMI 41.58 kg/m2 Estimated body mass index is 41.58 kg/(m^2) as calculated from the following:    Height as of this encounter: 1.981 m (6' 5.99\").    Weight as of this encounter: 163.2 kg (359 lb 11.2 oz). Body surface area is 3 meters squared.  No Pain (0) Comment: Data Unavailable   No LMP for male patient.  Allergies reviewed: Yes  Medications reviewed: Yes    Medications: Medication refills not needed today.  Pharmacy name entered into IO Turbine:    [id number] - [place] - [address] [place] [address] AT INTERSECTION [address] [place], [place] - [address] [place] [address] [phone]  [id number] - [place], [place] - [address] [facility]    Clinical concerns: no new concerns      6 minutes for nursing intake (face to face time)     Noris Chun CMA            "

## 2018-10-23 NOTE — MR AVS SNAPSHOT
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      If you are having any concerning symptoms or wish to speak to a provider before your next infusion visit, please call your care coordinator or triage to notify them so we can adequately serve you.     If you need a refill on a narcotic prescription or other medication, please call before your infusion appointment.                   October 2018 Sunday Monday Tuesday Wednesday Thursday Friday Saturday        1     2     [facility] RETURN   11:15 AM   (30 min.)   Kadi Dee MD   [facility] ONC INFUSION 120    1:30 PM   (120 min.)   UC ONCOLOGY INFUSION   [facility] 3     4     5     6       7     8     9     10     CT CHEST/ABDOMEN/PELVIS W    1:00 PM   (20 min.)   UUCT4   [facility], [place], [place]     Admission    1:10 PM   Leonel Joel MD   Same Day Surgery [facility]   (Discharge: 10/10/2018)     COMBINED ESOPHAGOSCOPY, GASTROSCOPY, DUODENOSCOPY (EGD)    4:00 PM   Leonel Joel MD   [facility] OR     XR SURG SUHAS <5 MIN FL W STILL    4:20 PM   (30 min.)   [email or web address]   [facility],  Radiology 11     12     [facility] RETURN    9:15 AM    (30 min.)   Kadi Dee MD   [facility] 13       14     15     16     17     18     19     20       21     22     23     [facility] DRAW   11:30 AM   (15 min.)    [facility]     [facility] RETURN   11:45 AM   (30 min.)   Kadi Dee MD   [facility]     [facility] ONC INFUSION 120    1:30 PM   (120 min.)   UC ONCOLOGY INFUSION   [facility] 24     25     26     27       28     29     30     31 November 2018 Sunday Monday Tuesday Wednesday Thursday Friday Saturday                       1     2     3       4     5     6     7     8     9     10       11     12     13     [facility] DRAW    9:00 AM   (15 min.)    [facility] DRAW   [facility] Lab Draw     P RETURN    9:15 AM   (50 min.)   [facility]oraine Sutherland [facility]-C   [facility]   12:30 PM   (60 min.)   UC ONCOLOGY INFUSION   [facility] 14     15     16     17       18     19     20     [facility] NEW   10:45 AM   (60 min.)   Nieves Butcher MD   [facility] 21     22     23     24       25     26     27     28     29     30                      Recent Results (from the past 24 hour(s))   Hepatic panel    Collection Time: 10/23/18 11:54 AM   Result Value Ref Range    Bilirubin Direct 0.2 0.0 - 0.2 mg/dL    Bilirubin Total 0.6 0.2 - 1.3 mg/dL    Albumin 3.5 3.4 - 5.0 g/dL    Protein Total 7.1 6.8 - 8.8 g/dL    Alkaline Phosphatase 79 40 - 150 U/L    ALT 20 0 - 70 U/L    AST 15 0 - 45 U/L   CBC with platelets differential    Collection Time: 10/23/18 11:54 AM   Result Value Ref Range    WBC 3.3 (L) 4.0 - 11.0 10e9/L    RBC Count 4.13 (L) 4.4 - 5.9 10e12/L    Hemoglobin 11.8 (L) 13.3 - 17.7 g/dL    Hematocrit 37.8 (L) 40.0 - 53.0 %    MCV 92 78 - 100 fl    MCH 28.6 26.5 - 33.0 pg    MCHC 31.2 (L) 31.5 - 36.5 g/dL    RDW 15.9 (H) 10.0 - 15.0 %     Platelet Count 150 150 - 450 10e9/L    Diff Method Automated Method     % Neutrophils 63.5 %    % Lymphocytes 27.7 %    % Monocytes 6.7 %    % Eosinophils 1.2 %    % Basophils 0.6 %    % Immature Granulocytes 0.3 %    Nucleated RBCs 0 0 /100    Absolute Neutrophil 2.1 1.6 - 8.3 10e9/L    Absolute Lymphocytes 0.9 0.8 - 5.3 10e9/L    Absolute Monocytes 0.2 0.0 - 1.3 10e9/L    Absolute Eosinophils 0.0 0.0 - 0.7 10e9/L    Absolute Basophils 0.0 0.0 - 0.2 10e9/L    Abs Immature Granulocytes 0.0 0 - 0.4 10e9/L    Absolute Nucleated RBC 0.0    Prealbumin    Collection Time: 10/23/18 11:54 AM   Result Value Ref Range    Prealbumin 13 (L) 15 - 45 mg/dL   TSH with free T4 reflex    Collection Time: 10/23/18 11:54 AM   Result Value Ref Range    TSH 3.04 0.40 - 4.00 mU/L   Creatinine    Collection Time: 10/23/18 11:54 AM   Result Value Ref Range    Creatinine 0.89 0.66 - 1.25 mg/dL    GFR Estimate 88 >60 mL/min/1.7m2    GFR Estimate If Black >90 >60 mL/min/1.7m2   EKG 12-lead complete w/read - Clinics    Collection Time: 10/23/18  1:03 PM   Result Value Ref Range    Interpretation ECG Click View Image link to view waveform and result                  Follow-ups after your visit        Your next 10 appointments already scheduled     Nov 13, 2018  9:00 AM CST   [facility] LAB DRAW   [facility] Lab Draw ([facility])    [address] [place] [address]  [address]  [facility] [address] [phone]            Nov 13, 2018  9:30 AM CST   (Arrive by 9:15 AM)   Return Visit with Loraine Sutherland PA-C   [facility] ([facility])    [address] [place] [address]  [address]  [facility] [address] [phone]            Nov 13, 2018 12:30 PM CST   Infusion 60 with  ONCOLOGY INFUSION, UC 22 ATC   [facility] ([facility])    [address] [place] [address]  [address]  [facility] [id number] [phone]             Nov 20, 2018 11:00 AM CST   (Arrive by 10:45 AM)   New Patient Visit with Nieves Butcher MD   [facility] ([facility])    [address] [place] [address]  [address]  [facility] [address] [phone]            Dec 03, 2018  8:00 AM CST   [facility] DRAW   [facility] Lab Draw ([facility])    [address] [place] [address]  [address]  [facility] [address] [phone]            Dec 03, 2018  8:30 AM CST   (Arrive by 8:15 AM)   Return Visit with Kadi Dee MD   [facility] ([facility])    [address] [place] [address]  [address]  [facility] [address] [phone]            Dec 03, 2018 10:00 AM CST   Infusion 60 with UC ONCOLOGY INFUSION, UC 29 ATC   [facility] ([facility])    [address] [place] [address]  [address]  [facility] [id number] [phone]              Future tests that were ordered for you today     Open Future Orders        Priority Expected Expires Ordered    TSH with free T4 reflex Add-On  10/23/2019 10/23/2018            Who to contact     If you have questions or need follow up information about today's clinic visit or your schedule please contact [facility] directly at [phone].  Normal or non-critical lab and imaging results will be communicated to you by MyChart, letter or phone within 4 business days after the clinic has received the results. If you do not hear from us within 7 days, please contact the clinic through MyChart or phone. If you have a critical or abnormal lab result, we will notify you by phone as soon as possible.  Submit refill requests through [employer] or call your pharmacy and they will forward the refill request to us. Please allow 3 business days for your refill to be completed.          Additional Information About Your Visit        MyChart Information      [employer] gives you secure access to your electronic health record. If you see a primary care provider, you can also send messages to your care team and make appointments. If you have questions, please call your primary care clinic.  If you do not have a primary care provider, please call [phone] and they will assist you.        Care EveryWhere ID     This is your Care EveryWhere ID. This could be used by other organizations to access your [place] medical records  [id number]         Blood Pressure from Last 3 Encounters:   10/23/18 (!) 138/95   10/12/18 130/86   10/10/18 (!) 144/107    Weight from Last 3 Encounters:   10/23/18 (!) 163.2 kg (359 lb 11.2 oz)   10/12/18 (!) 163.8 kg (361 lb 1 oz)   10/10/18 (!) 163.8 kg (361 lb 1.8 oz)              We Performed the Following     CBC with platelets differential     Creatinine     Hepatic panel     Prealbumin     TSH with free T4 reflex          Today's Medication Changes          These changes are accurate as of 10/23/18  3:09 PM.  If you have any questions, ask your nurse or doctor.               These medicines have changed or have updated prescriptions.        Dose/Directions    gabapentin 300 MG capsule   Commonly known as:  NEURONTIN   This may have changed:    - how much to take  - when to take this  - additional instructions   Used for:  Chemotherapy-induced neuropathy (H)        Two tablets in the am and one tablet in the afternoon and evening   Quantity:  180 capsule   Refills:  1            Where to get your medicines      These medications were sent to [place], [place] - [address] [place] [address] [address] [address]    Hours:  TRANSPLANT PHONE NUMBER [phone] Phone:  [phone]     prochlorperazine 10 MG tablet         Some of these will need a paper prescription and others can be bought over the counter.  Ask your nurse if you have questions.     Bring a paper prescription for  each of these medications     LORazepam 0.5 MG tablet                Primary Care Provider Fax #    Physician No Ref-Primary [phone]       No address on file        Equal Access to Services     OLLIE SHARPE : Eliza aad ku hadmadison Dunlap, yeyo renaleksandra, nancy sotelo, geraldo dhaliwalotilio ronald. So [facility] [phone].    ATENCIÓN: Si habla español, tiene a alamo disposición servicios gratuitos de asistencia lingüística. Llame al [phone].    We comply with applicable federal civil rights laws and [place] laws. We do not discriminate on the basis of race, color, national origin, age, disability, sex, sexual orientation, or gender identity.            Thank you!     Thank you for choosing [facility] CLINIC  for your care. Our goal is always to provide you with excellent care. Hearing back from our patients is one way we can continue to improve our services. Please take a few minutes to complete the written survey that you may receive in the mail after your visit with us. Thank you!             Your Updated Medication List - Protect others around you: Learn how to safely use, store and throw away your medicines at www.disposemymeds.org.          This list is accurate as of 10/23/18  3:09 PM.  Always use your most recent med list.                   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)       *  enoxaparin 150 MG/ML injection    LOVENOX    60 mL    INJECT 0.86ML (130MG) TWICE A DAY FOR ACUTE PULMONARY EMBOLISM AND ESOPHAGEAL MALIGNANCY    Hx of pulmonary embolus       fish oil-omega-3 fatty acids 1000 MG capsule      Take 2 g by mouth daily        gabapentin 300 MG capsule    NEURONTIN    180 capsule    Two tablets in the am and one tablet in the afternoon and evening    Chemotherapy-induced neuropathy (H)       ketamine 5% gabapentin 8% lidocaine 2.5% topical PLO cream     30 g    Use small amount topically to feet three times daily    Cancer of distal third of esophagus (H), Other secondary hypertension       latanoprost 0.005 % ophthalmic solution    XALATAN    1 Bottle    Place 1 drop into both eyes At Bedtime    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)       lidocaine-prilocaine cream    EMLA    30 g    Apply topically as needed for moderate pain    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)       lisinopril 40 MG tablet    PRINIVIL/ZESTRIL     Take 40 mg by mouth daily        * LORazepam 0.5 MG tablet    ATIVAN    30 tablet    Take 1 tablet (0.5 mg) by mouth every 4 hours as needed (Anxiety, Nausea/Vomiting or Sleep)    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)       * LORazepam 0.5 MG tablet    ATIVAN    30 tablet    Take 1 tablet (0.5 mg) by mouth every 4 hours as needed (Anxiety, Nausea/Vomiting or Sleep)    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)       metoclopramide 10 MG tablet    REGLAN    120 tablet    Take 1 tablet (10 mg) by mouth 3 times daily    Gastroparesis       Multi-vitamin Tabs tablet      Take 1 tablet by mouth daily        omeprazole 40 MG capsule    priLOSEC    90 capsule    Take 1 capsule (40 mg) by mouth daily    Cancer of distal third of esophagus (H)       ondansetron 8 MG tablet    ZOFRAN    30 tablet    Take 1 tablet (8 mg) by mouth every 8 hours as needed (Nausea/Vomiting)    Cancer of distal third of esophagus (H)       prochlorperazine 10 MG tablet    COMPAZINE    30 tablet     Take 1 tablet (10 mg) by mouth every 6 hours as needed (Nausea/Vomiting)    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 Read the directions carefully, and ask your doctor or other care provider to review them with you.

## 2018-10-23 NOTE — PATIENT INSTRUCTIONS
Contact Numbers    [facility] [place]: [phone]  [facility] [place] and after hours / weekends / holidays:  [phone]      Please call the triage or after hours line if you experience a temperature greater than or equal to 100.5, shaking chills, have uncontrolled nausea, vomiting and/or diarrhea, dizziness, shortness of breath, chest pain, bleeding, unexplained bruising, or if you have any other new/concerning symptoms, questions or concerns.      If you are having any concerning symptoms or wish to speak to a provider before your next infusion visit, please call your care coordinator or triage to notify them so we can adequately serve you.     If you need a refill on a narcotic prescription or other medication, please call before your infusion appointment.                   October 2018 Sunday Monday Tuesday Wednesday Thursday Friday Saturday        1     2     [facility] RETURN   11:15 AM   (30 min.)   Kadi Dee MD   [facility] ONC INFUSION 120    1:30 PM   (120 min.)    ONCOLOGY INFUSION   [facility] 3     4     5     6       7     8     9     10     CT CHEST/ABDOMEN/PELVIS W    1:00 PM   (20 min.)   UUCT4   [facility], [place]     Admission    1:10 PM   Leonel Joel MD   Same Day Surgery [facility]   (Discharge: 10/10/2018)     COMBINED ESOPHAGOSCOPY, GASTROSCOPY, DUODENOSCOPY (EGD)    4:00 PM   Leonel Joel MD   [facility] OR     XR SURG SUHAS <5 MIN FL W STILL    4:20 PM   (30 min.)   [email or web address]   [facility],  Radiology 11     12     P RETURN    9:15 AM   (30 min.)   Kadi Dee MD   [facility] 13       14     15     16     17     18     19     20       21     22     23     [facility] DRAW   11:30 AM   (15 min.)    [facility] DRAW   [facility] Lab Draw     [facility] RETURN   11:45 AM   (30 min.)   Kadi Dee MD   [facility] ONC INFUSION 120    1:30 PM   (120 min.)     ONCOLOGY INFUSION   [facility] 24     25     26     27       28     29     30     31 November 2018 Sunday Monday Tuesday Wednesday Thursday Friday Saturday                       1     2     3       4     5     6     7     8     9     10       11     12     13     [facility] DRAW    9:00 AM   (15 min.)    [facility] DRAW   [facility] Lab Draw     [facility] RETURN    9:15 AM   (50 min.)   Loraine Sutherland PA-C   [facility] ONC INFUSION 60   12:30 PM   (60 min.)    ONCOLOGY INFUSION   [facility] 14     15     16     17       18     19     20     [facility] NEW   10:45 AM   (60 min.)   Nieves Butcher MD   [facility] 21     22     23     24       25     26     27     28     29     30                      Recent Results (from the past 24 hour(s))   Hepatic panel    Collection Time: 10/23/18 11:54 AM   Result Value Ref Range    Bilirubin Direct 0.2 0.0 - 0.2 mg/dL    Bilirubin Total 0.6 0.2 - 1.3 mg/dL    Albumin 3.5 3.4 - 5.0 g/dL    Protein Total 7.1 6.8 - 8.8 g/dL    Alkaline Phosphatase 79 40 - 150 U/L    ALT 20 0 - 70 U/L    AST 15 0 - 45 U/L   CBC with platelets differential    Collection Time: 10/23/18 11:54 AM   Result Value Ref Range    WBC 3.3 (L) 4.0 - 11.0 10e9/L    RBC Count 4.13 (L) 4.4 - 5.9 10e12/L    Hemoglobin 11.8 (L) 13.3 - 17.7 g/dL    Hematocrit 37.8 (L) 40.0 - 53.0 %    MCV 92 78 - 100 fl    MCH 28.6 26.5 - 33.0 pg    MCHC 31.2 (L) 31.5 - 36.5 g/dL    RDW 15.9 (H) 10.0 - 15.0 %    Platelet Count 150 150 - 450 10e9/L    Diff Method Automated Method     % Neutrophils 63.5 %    % Lymphocytes 27.7 %    % Monocytes 6.7 %    % Eosinophils 1.2 %    % Basophils 0.6 %    % Immature Granulocytes 0.3 %    Nucleated RBCs 0 0 /100    Absolute Neutrophil 2.1 1.6 - 8.3 10e9/L    Absolute Lymphocytes 0.9 0.8 - 5.3 10e9/L    Absolute Monocytes 0.2 0.0 - 1.3 10e9/L    Absolute  Eosinophils 0.0 0.0 - 0.7 10e9/L    Absolute Basophils 0.0 0.0 - 0.2 10e9/L    Abs Immature Granulocytes 0.0 0 - 0.4 10e9/L    Absolute Nucleated RBC 0.0    Prealbumin    Collection Time: 10/23/18 11:54 AM   Result Value Ref Range    Prealbumin 13 (L) 15 - 45 mg/dL   TSH with free T4 reflex    Collection Time: 10/23/18 11:54 AM   Result Value Ref Range    TSH 3.04 0.40 - 4.00 mU/L   Creatinine    Collection Time: 10/23/18 11:54 AM   Result Value Ref Range    Creatinine 0.89 0.66 - 1.25 mg/dL    GFR Estimate 88 >60 mL/min/1.7m2    GFR Estimate If Black >90 >60 mL/min/1.7m2   EKG 12-lead complete w/read - Clinics    Collection Time: 10/23/18  1:03 PM   Result Value Ref Range    Interpretation ECG Click View Image link to view waveform and result

## 2018-10-23 NOTE — LETTER
10/23/2018       RE: Reuben Padilla  [address] [place] [id number]     Dear Colleague,    Thank you for referring your patient, Reuben Padilla, to the [facility]. Please see a copy of my visit note below.    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 His tumor is positive for cytokeratin 20, negative for P63 and CK7, and HER2 is negative. He started on FOLFOX (5FU/oxaliplatin) on 5/15/2017. He had a delay in treatment from 9/5-10/2/17 due to work related injury.    He had an excellent response by imaging throughout the summer 2017.    He a mixed response by imaging in late fall 2017.    In January 2018, he was switched to taxol and cyramza - had slight progression and neuropathy and clinical trial became available.       In March 2018, he was started on the [facility] Match Clinical Trial with crizotinib.  (MET amplification) He remained on crizotinib from March - July 2018.    August 2018, he was switched back to taxol/cramza.    INTERVAL HISTORY: Levi comes in today for followup.He started reglan.  He is unsure this has helped a whole lot.  He is maintaining his weight.  He reports intermittent dysphagia and vomiting though.        No fevers or chills.  No chest pain or shortness of breath.  His energy levels are good.  His has an ECOG performance status of 1.  No problems with balance or falls.  No numbness or tingling in his fingertips.  He does have tingling in his toes bilaterally.  This has improved significantly and is now only to his ankles.  His 10-point review of systems is otherwise negative.         PHYSICAL EXAMINATION  BP (!) 138/95 (BP Location: Left arm, Patient Position: Sitting, Cuff Size: Adult Large)  Pulse 96  Temp 97.4  F (36.3  C) (Oral)  Resp  "20  Ht 1.981 m (6' 5.99\")  Wt (!) 163.2 kg (359 lb 11.2 oz)  SpO2 97%  BMI 41.58 kg/m2   Wt Readings from Last 4 Encounters:   10/23/18 (!) 163.2 kg (359 lb 11.2 oz)   10/12/18 (!) 163.8 kg (361 lb 1 oz)   10/10/18 (!) 163.8 kg (361 lb 1.8 oz)   10/02/18 (!) 162 kg (357 lb 4 oz)     Constitutional: Alert, oriented male in no visible distress.  Eyes: PERRL. Anicteric sclerae.  ENT/Mouth: OM moist and pink without lesions or thrush.  CV: RRR  Resp: CTAB throughout  Abdomen: Soft, non-tender, non-distended. Obese. Bowel sounds present. Unable to palpate liver or spleen.   Extremities: trace edema   Skin: Warm, dry.   Lymph: No cervical or supraclavicular lymphadenopathy appreciated.   Neuro: CN II-XII grossly intact.       ECOG PS: 1       Lab Results   Component Value Date    WBC 3.3 10/23/2018     Lab Results   Component Value Date    RBC 4.13 10/23/2018     Lab Results   Component Value Date    HGB 11.8 10/23/2018     Lab Results   Component Value Date    HCT 37.8 10/23/2018     No components found for: MCT  Lab Results   Component Value Date    MCV 92 10/23/2018     Lab Results   Component Value Date    MCH 28.6 10/23/2018     Lab Results   Component Value Date    MCHC 31.2 10/23/2018     Lab Results   Component Value Date    RDW 15.9 10/23/2018     Lab Results   Component Value Date     10/23/2018       Recent Labs   Lab Test  10/23/18   1154  10/10/18   1449  07/24/18   1516   NA   --   142  140   POTASSIUM   --   3.9  4.2   CHLORIDE   --   110*  108   CO2   --   25  26   ANIONGAP   --   7  6   GLC   --   92  100*   BUN   --   10  10   CR  0.89  0.76  0.91   NIDHI   --   8.4*  8.4*     Liver Function Studies -   Recent Labs   Lab Test  10/23/18   1154   PROTTOTAL  7.1   ALBUMIN  3.5   BILITOTAL  0.6   ALKPHOS  79   AST  15   ALT  20             IMPRESSION/PLAN:  1. Metastatic esophogeal adenocarcinoma. He has had treatment with FOLFOX and then transitioned to Taxol with ramicurimab.  He was then on " crizotinib on the [facility] Match study.  He progressed on this and returned on taxol/cyramza.     I reviewed with Levi and his sister today that overall he has slight progression by his CT imaging.   We discussed switching his therapy to Keytruda.  He has low positive PD-L1 status.  We discussed he could be a candidate for Keytruda.  This was previously approved by his insurance.  This should be administered IV every 3 weeks.  We would plan to reimage him after 3 or 4 infusions depending on how he is doing.      He understands all of our therapy is palliative.  He has really struggled with this as he thinks about end-of-life care.  I encouraged him to make an appointment with our Palliative Care colleagues.      Edema is stable.      A history of PE.  He is on Lovenox twice daily.  He has progressed through Xarelto in the past.      He does have baseline neuropathy and is on gabapentin 600/300/600 mg daily.  We have discussed using a gabapentin gel for his feet bilaterally.      He did develop hypertension from his Cyramza as well as proteinuria.  I discussed as we discontinue this, he may need reduction in his blood pressure medication.      Dysphagia with associated gastroparesis by endoscopy.  I discussed a trial of Reglan and this seems to be helping some.  If he does not notice improvement in this, he may need a PEG tube placed.  He understands this.  He is hoping that this is not the case.        He understands his therapies are not curative intent but rather palliative.  Additional supportive care measures are discussed.         Kadi Dee M.D.    Hematology and Oncology  [facility]  [phone]

## 2018-10-24 LAB — INTERPRETATION ECG - MUSE: NORMAL

## 2018-10-26 ENCOUNTER — TELEPHONE (OUTPATIENT)
Dept: ONCOLOGY | Facility: CLINIC | Age: 58
End: 2018-10-26

## 2018-10-26 NOTE — TELEPHONE ENCOUNTER
Per Patient's request,  completed and faxed [facility] referral for lodging dates 11/12 - 11/14 and 12/2 - 12/4. [facility] Strang will contact Patient directly for confirmation of reservation.  will continue to provide support as needed.      Nieves Casper (Martens), [facility], MSW   - [facility]  Phone: [phone]  Pager: [phone]  10/26/2018

## 2018-10-27 NOTE — PROGRESS NOTES
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 His tumor is positive for cytokeratin 20, negative for P63 and CK7, and HER2 is negative. He started on FOLFOX (5FU/oxaliplatin) on 5/15/2017. He had a delay in treatment from 9/5-10/2/17 due to work related injury.    He had an excellent response by imaging throughout the summer 2017.    He a mixed response by imaging in late fall 2017.    In January 2018, he was switched to taxol and cyramza - had slight progression and neuropathy and clinical trial became available.       In March 2018, [facility] with [facility]ib.  (MET amplification) He remained on crizotinib from March - July 2018.    August 2018, he was switched back to taxol/cramza.    INTERVAL HISTORY: Levi comes in today for followup.He started reglan.  He is unsure this has helped a whole lot.  He is maintaining his weight.  He reports intermittent dysphagia and vomiting though.        No fevers or chills.  No chest pain or shortness of breath.  His energy levels are good.  His has an ECOG performance status of 1.  No problems with balance or falls.  No numbness or tingling in his fingertips.  He does have tingling in his toes bilaterally.  This has improved significantly and is now only to his ankles.  His 10-point review of systems is otherwise negative.         PHYSICAL EXAMINATION  BP (!) 138/95 (BP Location: Left arm, Patient Position: Sitting, Cuff Size: Adult Large)  Pulse 96  Temp 97.4  F (36.3  C) (Oral)  Resp 20  Ht 1.981 m (6' 5.99\")  Wt (!) 163.2 kg (359 lb 11.2 oz)  SpO2 97%  BMI 41.58 kg/m2   Wt Readings from Last 4 Encounters:   10/23/18 (!) 163.2 kg (359 lb 11.2 oz)   10/12/18 (!) 163.8 kg (361 lb 1 oz)   10/10/18 (!) 163.8 kg (361 lb " 1.8 oz)   10/02/18 (!) 162 kg (357 lb 4 oz)     Constitutional: Alert, oriented male in no visible distress.  Eyes: PERRL. Anicteric sclerae.  ENT/Mouth: OM moist and pink without lesions or thrush.  CV: RRR  Resp: CTAB throughout  Abdomen: Soft, non-tender, non-distended. Obese. Bowel sounds present. Unable to palpate liver or spleen.   Extremities: trace edema   Skin: Warm, dry.   Lymph: No cervical or supraclavicular lymphadenopathy appreciated.   Neuro: CN II-XII grossly intact.       ECOG PS: 1       Lab Results   Component Value Date    WBC 3.3 10/23/2018     Lab Results   Component Value Date    RBC 4.13 10/23/2018     Lab Results   Component Value Date    HGB 11.8 10/23/2018     Lab Results   Component Value Date    HCT 37.8 10/23/2018     No components found for: MCT  Lab Results   Component Value Date    MCV 92 10/23/2018     Lab Results   Component Value Date    MCH 28.6 10/23/2018     Lab Results   Component Value Date    MCHC 31.2 10/23/2018     Lab Results   Component Value Date    RDW 15.9 10/23/2018     Lab Results   Component Value Date     10/23/2018       Recent Labs   Lab Test  10/23/18   1154  10/10/18   1449  07/24/18   1516   NA   --   142  140   POTASSIUM   --   3.9  4.2   CHLORIDE   --   110*  108   CO2   --   25  26   ANIONGAP   --   7  6   GLC   --   92  100*   BUN   --   10  10   CR  0.89  0.76  0.91   NIDHI   --   8.4*  8.4*     Liver Function Studies -   Recent Labs   Lab Test  10/23/18   1154   PROTTOTAL  7.1   ALBUMIN  3.5   BILITOTAL  0.6   ALKPHOS  79   AST  15   ALT  20             IMPRESSION/PLAN:  1. Metastatic esophogeal adenocarcinoma. He has had treatment with FOLFOX and then transitioned to Taxol with ramicurimab.  He was then on crizotinib on the [facility] Match study.  He progressed on this and returned on taxol/cyramza.     I reviewed with Levi and his sister today that overall he has slight progression by his CT imaging.   We discussed switching his therapy to Keytruda.   He has low positive PD-L1 status.  We discussed he could be a candidate for Keytruda.  This was previously approved by his insurance.  This should be administered IV every 3 weeks.  We would plan to reimage him after 3 or 4 infusions depending on how he is doing.      He understands all of our therapy is palliative.  He has really struggled with this as he thinks about end-of-life care.  I encouraged him to make an appointment with our Palliative Care colleagues.      Edema is stable.      A history of PE.  He is on Lovenox twice daily.  He has progressed through Xarelto in the past.      He does have baseline neuropathy and is on gabapentin 600/300/600 mg daily.  We have discussed using a gabapentin gel for his feet bilaterally.      He did develop hypertension from his Cyramza as well as proteinuria.  I discussed as we discontinue this, he may need reduction in his blood pressure medication.      Dysphagia with associated gastroparesis by endoscopy.  I discussed a trial of Reglan and this seems to be helping some.  If he does not notice improvement in this, he may need a PEG tube placed.  He understands this.  He is hoping that this is not the case.        He understands his therapies are not curative intent but rather palliative.  Additional supportive care measures are discussed.         Kadi Dee M.D.    Hematology and Oncology  [facility]  [phone]

## 2018-10-31 ENCOUNTER — CARE COORDINATION (OUTPATIENT)
Dept: ONCOLOGY | Facility: CLINIC | Age: 58
End: 2018-10-31

## 2018-10-31 DIAGNOSIS — R13.19 ESOPHAGEAL DYSPHAGIA: ICD-10-CM

## 2018-10-31 DIAGNOSIS — C15.5 CANCER OF DISTAL THIRD OF ESOPHAGUS (H): Primary | ICD-10-CM

## 2018-10-31 RX ORDER — LISINOPRIL 40 MG/1
20 TABLET ORAL DAILY
Qty: 30 TABLET | Refills: 1 | Status: ON HOLD | COMMUNITY
Start: 2018-10-31 | End: 2018-11-07

## 2018-10-31 NOTE — PROGRESS NOTES
"Received a call from Levi who reports the following symptoms:    1: Having episodes of being lightheaded. He denies it occurring with position changes; states it happens when he is standing and \"gets to the point where I almost hit the floor\" He reports his BP yesterday was 108/72, he was unable to give other specific BP readings.   2: Having pain in his back that wraps around his left side up under his ribs. He rates the pain a 6-7 and states nothing helps the pain and it lasts approximately 4 hours at at time.  3: Hands are sore when he clenches his fist., not painful when hand is stretched out  4: Pain behind left knee. Reports he can walk on it but he does need to hold onto the wall in the morning to get himself going. Confirmed that he is taking his Lovenox BID as ordered  5: Eating has continued to be an issue over the last week, it appears to be getting worse. Yesterday he was unable to consume chicken soup. He \"brought it back up\" but felt there was an amount that remained stuck and took a couple of hours to clear. He had not weighed himself since his clinic visit on 10/23/18. Writer requested that he weigh himself while on the phone, he reported his weight at 343 lbs, this is down from 359 lbs when in clinic last week. He report he typically has a 5 lb difference in weight between the clinic scale and his home scale. Writer discussed with him that it is very concerning that he has had a 16 lbs weight loss over the last week (11 lbs if you subtract 5 lbs for scale difference). Writer stressed that he is reaching a point with the weight loss that he needs to seriously consider having the g-tube placed which he has been uninterested in. He states he wants to explore other options such as a stent before proceeding with a g-tube tube. Writer reviewed that he is not eligible for a stent at this time because the tumor is not blocking his esophagus. Levi states he is \"not thrilled with the prognosis from endo, I " "don't think he knows what he was looking at down there\". Levi further states that getting a tube is just another foot in the grave\". Writer discussed that placing a g-tube would be to give him, nutrition to help him be healthy enough to continue with his treatment and explore other options for treatment.    Discussed all the above with Nicole Sutherland PA-C and called Levi back to discuss:    1: Stop Amlodipine  2: Decrease Lisinopril to 20mg  3: Nicole ordered an Esophagram, scheduled for 11/1 at 11:00  4: See Katalina Ramirez PA-C 11/1 at 1:40  Levi verbalized understanding and agreement with all the above.  "

## 2018-11-01 ENCOUNTER — HOSPITAL ENCOUNTER (INPATIENT)
Facility: CLINIC | Age: 58
Setting detail: SURGERY ADMIT
End: 2018-11-01
Attending: STUDENT IN AN ORGANIZED HEALTH CARE EDUCATION/TRAINING PROGRAM | Admitting: STUDENT IN AN ORGANIZED HEALTH CARE EDUCATION/TRAINING PROGRAM
Payer: COMMERCIAL

## 2018-11-01 ENCOUNTER — APPOINTMENT (OUTPATIENT)
Dept: LAB | Facility: CLINIC | Age: 58
End: 2018-11-01
Attending: INTERNAL MEDICINE
Payer: COMMERCIAL

## 2018-11-01 ENCOUNTER — ONCOLOGY VISIT (OUTPATIENT)
Dept: ONCOLOGY | Facility: CLINIC | Age: 58
End: 2018-11-01
Attending: PHYSICIAN ASSISTANT
Payer: COMMERCIAL

## 2018-11-01 ENCOUNTER — RADIANT APPOINTMENT (OUTPATIENT)
Dept: GENERAL RADIOLOGY | Facility: CLINIC | Age: 58
End: 2018-11-01
Attending: PHYSICIAN ASSISTANT
Payer: COMMERCIAL

## 2018-11-01 ENCOUNTER — INFUSION THERAPY VISIT (OUTPATIENT)
Dept: ONCOLOGY | Facility: CLINIC | Age: 58
End: 2018-11-01
Attending: INTERNAL MEDICINE
Payer: COMMERCIAL

## 2018-11-01 ENCOUNTER — HOSPITAL ENCOUNTER (INPATIENT)
Facility: CLINIC | Age: 58
LOS: 1 days | Discharge: HOME HEALTH | End: 2018-11-02
Attending: INTERNAL MEDICINE | Admitting: INTERNAL MEDICINE
Payer: COMMERCIAL

## 2018-11-01 VITALS
TEMPERATURE: 98.2 F | HEART RATE: 109 BPM | OXYGEN SATURATION: 97 % | BODY MASS INDEX: 39.78 KG/M2 | DIASTOLIC BLOOD PRESSURE: 66 MMHG | SYSTOLIC BLOOD PRESSURE: 88 MMHG | WEIGHT: 315 LBS | RESPIRATION RATE: 16 BRPM

## 2018-11-01 VITALS — OXYGEN SATURATION: 99 % | DIASTOLIC BLOOD PRESSURE: 72 MMHG | HEART RATE: 106 BPM | SYSTOLIC BLOOD PRESSURE: 118 MMHG

## 2018-11-01 DIAGNOSIS — Z79.01 LONG TERM CURRENT USE OF ANTICOAGULANT THERAPY: ICD-10-CM

## 2018-11-01 DIAGNOSIS — D70.1 CHEMOTHERAPY-INDUCED NEUTROPENIA (H): ICD-10-CM

## 2018-11-01 DIAGNOSIS — Z86.711 HX OF PULMONARY EMBOLUS: ICD-10-CM

## 2018-11-01 DIAGNOSIS — R00.0 TACHYCARDIA: ICD-10-CM

## 2018-11-01 DIAGNOSIS — C15.5 CANCER OF DISTAL THIRD OF ESOPHAGUS (H): Primary | ICD-10-CM

## 2018-11-01 DIAGNOSIS — E86.0 DEHYDRATION: ICD-10-CM

## 2018-11-01 DIAGNOSIS — T45.1X5A CHEMOTHERAPY-INDUCED NEUTROPENIA (H): ICD-10-CM

## 2018-11-01 DIAGNOSIS — C15.5 CANCER OF DISTAL THIRD OF ESOPHAGUS (H): ICD-10-CM

## 2018-11-01 DIAGNOSIS — C15.9 MALIGNANT NEOPLASM OF ESOPHAGUS, UNSPECIFIED LOCATION (H): Primary | ICD-10-CM

## 2018-11-01 DIAGNOSIS — R13.19 ESOPHAGEAL DYSPHAGIA: ICD-10-CM

## 2018-11-01 DIAGNOSIS — I49.9 ARRHYTHMIA: ICD-10-CM

## 2018-11-01 DIAGNOSIS — K22.2 ESOPHAGEAL OBSTRUCTION: Primary | ICD-10-CM

## 2018-11-01 PROBLEM — I48.0 PAROXYSMAL A-FIB (H): Status: ACTIVE | Noted: 2018-11-01

## 2018-11-01 PROBLEM — E66.01 MORBID OBESITY (H): Status: ACTIVE | Noted: 2017-12-26

## 2018-11-01 PROBLEM — I95.1 ORTHOSTATIC HYPOTENSION: Status: ACTIVE | Noted: 2018-11-01

## 2018-11-01 PROBLEM — I26.99 ACUTE PULMONARY EMBOLISM (H): Status: ACTIVE | Noted: 2017-07-10

## 2018-11-01 LAB
ALBUMIN SERPL-MCNC: 3.5 G/DL (ref 3.4–5)
ALP SERPL-CCNC: 79 U/L (ref 40–150)
ALT SERPL W P-5'-P-CCNC: 20 U/L (ref 0–70)
ANION GAP SERPL CALCULATED.3IONS-SCNC: 9 MMOL/L (ref 3–14)
AST SERPL W P-5'-P-CCNC: 21 U/L (ref 0–45)
BASOPHILS # BLD AUTO: 0 10E9/L (ref 0–0.2)
BASOPHILS NFR BLD AUTO: 0.8 %
BILIRUB DIRECT SERPL-MCNC: 0.4 MG/DL (ref 0–0.2)
BILIRUB SERPL-MCNC: 1.5 MG/DL (ref 0.2–1.3)
BUN SERPL-MCNC: 19 MG/DL (ref 7–30)
CALCIUM SERPL-MCNC: 8.6 MG/DL (ref 8.5–10.1)
CHLORIDE SERPL-SCNC: 109 MMOL/L (ref 94–109)
CO2 SERPL-SCNC: 23 MMOL/L (ref 20–32)
CREAT SERPL-MCNC: 1.29 MG/DL (ref 0.66–1.25)
DIFFERENTIAL METHOD BLD: ABNORMAL
EOSINOPHIL # BLD AUTO: 0 10E9/L (ref 0–0.7)
EOSINOPHIL NFR BLD AUTO: 0.8 %
ERYTHROCYTE [DISTWIDTH] IN BLOOD BY AUTOMATED COUNT: 17 % (ref 10–15)
GFR SERPL CREATININE-BSD FRML MDRD: 57 ML/MIN/1.7M2
GLUCOSE SERPL-MCNC: 92 MG/DL (ref 70–99)
HCT VFR BLD AUTO: 37.6 % (ref 40–53)
HGB BLD-MCNC: 11.7 G/DL (ref 13.3–17.7)
IMM GRANULOCYTES # BLD: 0 10E9/L (ref 0–0.4)
IMM GRANULOCYTES NFR BLD: 0.4 %
LIPASE SERPL-CCNC: 75 U/L (ref 73–393)
LYMPHOCYTES # BLD AUTO: 0.8 10E9/L (ref 0.8–5.3)
LYMPHOCYTES NFR BLD AUTO: 31.6 %
MCH RBC QN AUTO: 28 PG (ref 26.5–33)
MCHC RBC AUTO-ENTMCNC: 31.1 G/DL (ref 31.5–36.5)
MCV RBC AUTO: 90 FL (ref 78–100)
MONOCYTES # BLD AUTO: 0.3 10E9/L (ref 0–1.3)
MONOCYTES NFR BLD AUTO: 9.8 %
NEUTROPHILS # BLD AUTO: 1.5 10E9/L (ref 1.6–8.3)
NEUTROPHILS NFR BLD AUTO: 56.6 %
NRBC # BLD AUTO: 0 10*3/UL
NRBC BLD AUTO-RTO: 0 /100
PLATELET # BLD AUTO: 129 10E9/L (ref 150–450)
POTASSIUM SERPL-SCNC: 4.3 MMOL/L (ref 3.4–5.3)
PROT SERPL-MCNC: 7 G/DL (ref 6.8–8.8)
RBC # BLD AUTO: 4.18 10E12/L (ref 4.4–5.9)
SODIUM SERPL-SCNC: 141 MMOL/L (ref 133–144)
WBC # BLD AUTO: 2.7 10E9/L (ref 4–11)

## 2018-11-01 PROCEDURE — 25000128 H RX IP 250 OP 636: Mod: ZF | Performed by: INTERNAL MEDICINE

## 2018-11-01 PROCEDURE — 96360 HYDRATION IV INFUSION INIT: CPT

## 2018-11-01 PROCEDURE — C9113 INJ PANTOPRAZOLE SODIUM, VIA: HCPCS | Performed by: INTERNAL MEDICINE

## 2018-11-01 PROCEDURE — 25000128 H RX IP 250 OP 636: Mod: ZF | Performed by: PHYSICIAN ASSISTANT

## 2018-11-01 PROCEDURE — 25000132 ZZH RX MED GY IP 250 OP 250 PS 637: Performed by: INTERNAL MEDICINE

## 2018-11-01 PROCEDURE — 82248 BILIRUBIN DIRECT: CPT | Performed by: PHYSICIAN ASSISTANT

## 2018-11-01 PROCEDURE — 96361 HYDRATE IV INFUSION ADD-ON: CPT

## 2018-11-01 PROCEDURE — 80053 COMPREHEN METABOLIC PANEL: CPT | Performed by: PHYSICIAN ASSISTANT

## 2018-11-01 PROCEDURE — 12000008 ZZH R&B INTERMEDIATE UMMC

## 2018-11-01 PROCEDURE — 83690 ASSAY OF LIPASE: CPT | Performed by: PHYSICIAN ASSISTANT

## 2018-11-01 PROCEDURE — 93010 ELECTROCARDIOGRAM REPORT: CPT | Performed by: INTERNAL MEDICINE

## 2018-11-01 PROCEDURE — 85025 COMPLETE CBC W/AUTO DIFF WBC: CPT | Performed by: PHYSICIAN ASSISTANT

## 2018-11-01 PROCEDURE — 99207 ZZC CHGS TRANSFERRED TO HOSPITAL: CPT | Mod: ZP | Performed by: PHYSICIAN ASSISTANT

## 2018-11-01 PROCEDURE — 99222 1ST HOSP IP/OBS MODERATE 55: CPT | Mod: AI | Performed by: INTERNAL MEDICINE

## 2018-11-01 PROCEDURE — 25000128 H RX IP 250 OP 636: Performed by: INTERNAL MEDICINE

## 2018-11-01 RX ORDER — NALOXONE HYDROCHLORIDE 0.4 MG/ML
.1-.4 INJECTION, SOLUTION INTRAMUSCULAR; INTRAVENOUS; SUBCUTANEOUS
Status: DISCONTINUED | OUTPATIENT
Start: 2018-11-01 | End: 2018-11-02 | Stop reason: HOSPADM

## 2018-11-01 RX ORDER — MORPHINE SULFATE 2 MG/ML
2-4 INJECTION, SOLUTION INTRAMUSCULAR; INTRAVENOUS
Status: DISCONTINUED | OUTPATIENT
Start: 2018-11-01 | End: 2018-11-02 | Stop reason: HOSPADM

## 2018-11-01 RX ORDER — HEPARIN SODIUM (PORCINE) LOCK FLUSH IV SOLN 100 UNIT/ML 100 UNIT/ML
5 SOLUTION INTRAVENOUS
Status: DISCONTINUED | OUTPATIENT
Start: 2018-11-01 | End: 2018-11-02 | Stop reason: HOSPADM

## 2018-11-01 RX ORDER — HEPARIN SODIUM (PORCINE) LOCK FLUSH IV SOLN 100 UNIT/ML 100 UNIT/ML
5 SOLUTION INTRAVENOUS ONCE
Status: COMPLETED | OUTPATIENT
Start: 2018-11-01 | End: 2018-11-01

## 2018-11-01 RX ORDER — DIPHENOXYLATE HCL/ATROPINE 2.5-.025MG
1 TABLET ORAL 4 TIMES DAILY PRN
Status: DISCONTINUED | OUTPATIENT
Start: 2018-11-01 | End: 2018-11-02 | Stop reason: HOSPADM

## 2018-11-01 RX ORDER — MAGNESIUM SULFATE HEPTAHYDRATE 40 MG/ML
4 INJECTION, SOLUTION INTRAVENOUS EVERY 4 HOURS PRN
Status: DISCONTINUED | OUTPATIENT
Start: 2018-11-01 | End: 2018-11-02 | Stop reason: HOSPADM

## 2018-11-01 RX ORDER — LIDOCAINE 40 MG/G
CREAM TOPICAL
Status: DISCONTINUED | OUTPATIENT
Start: 2018-11-01 | End: 2018-11-02 | Stop reason: HOSPADM

## 2018-11-01 RX ORDER — GABAPENTIN 300 MG/1
300 CAPSULE ORAL
Status: DISCONTINUED | OUTPATIENT
Start: 2018-11-02 | End: 2018-11-02 | Stop reason: HOSPADM

## 2018-11-01 RX ORDER — POTASSIUM CL/LIDO/0.9 % NACL 10MEQ/0.1L
10 INTRAVENOUS SOLUTION, PIGGYBACK (ML) INTRAVENOUS
Status: DISCONTINUED | OUTPATIENT
Start: 2018-11-01 | End: 2018-11-02 | Stop reason: HOSPADM

## 2018-11-01 RX ORDER — HEPARIN SODIUM,PORCINE 10 UNIT/ML
5-10 VIAL (ML) INTRAVENOUS
Status: DISCONTINUED | OUTPATIENT
Start: 2018-11-01 | End: 2018-11-02 | Stop reason: HOSPADM

## 2018-11-01 RX ORDER — CLINDAMYCIN PHOSPHATE 900 MG/50ML
900 INJECTION, SOLUTION INTRAVENOUS
Status: CANCELLED | OUTPATIENT
Start: 2018-11-01

## 2018-11-01 RX ORDER — SODIUM CHLORIDE 9 MG/ML
INJECTION, SOLUTION INTRAVENOUS CONTINUOUS
Status: DISCONTINUED | OUTPATIENT
Start: 2018-11-01 | End: 2018-11-02 | Stop reason: HOSPADM

## 2018-11-01 RX ORDER — PROCHLORPERAZINE MALEATE 10 MG
10 TABLET ORAL EVERY 6 HOURS PRN
Status: DISCONTINUED | OUTPATIENT
Start: 2018-11-01 | End: 2018-11-02 | Stop reason: HOSPADM

## 2018-11-01 RX ORDER — ONDANSETRON 2 MG/ML
4 INJECTION INTRAMUSCULAR; INTRAVENOUS EVERY 6 HOURS PRN
Status: DISCONTINUED | OUTPATIENT
Start: 2018-11-01 | End: 2018-11-02 | Stop reason: HOSPADM

## 2018-11-01 RX ORDER — GABAPENTIN 300 MG/1
600 CAPSULE ORAL 2 TIMES DAILY
Status: DISCONTINUED | OUTPATIENT
Start: 2018-11-01 | End: 2018-11-02 | Stop reason: HOSPADM

## 2018-11-01 RX ORDER — PROCHLORPERAZINE 25 MG
25 SUPPOSITORY, RECTAL RECTAL EVERY 12 HOURS PRN
Status: DISCONTINUED | OUTPATIENT
Start: 2018-11-01 | End: 2018-11-02 | Stop reason: HOSPADM

## 2018-11-01 RX ORDER — ONDANSETRON 4 MG/1
4 TABLET, ORALLY DISINTEGRATING ORAL EVERY 6 HOURS PRN
Status: DISCONTINUED | OUTPATIENT
Start: 2018-11-01 | End: 2018-11-02 | Stop reason: HOSPADM

## 2018-11-01 RX ORDER — CLINDAMYCIN PHOSPHATE 900 MG/50ML
900 INJECTION, SOLUTION INTRAVENOUS SEE ADMIN INSTRUCTIONS
Status: CANCELLED | OUTPATIENT
Start: 2018-11-01

## 2018-11-01 RX ORDER — POTASSIUM CHLORIDE 750 MG/1
20-40 TABLET, EXTENDED RELEASE ORAL
Status: DISCONTINUED | OUTPATIENT
Start: 2018-11-01 | End: 2018-11-02 | Stop reason: HOSPADM

## 2018-11-01 RX ORDER — POTASSIUM CHLORIDE 7.45 MG/ML
10 INJECTION INTRAVENOUS
Status: DISCONTINUED | OUTPATIENT
Start: 2018-11-01 | End: 2018-11-02 | Stop reason: HOSPADM

## 2018-11-01 RX ORDER — LATANOPROST 50 UG/ML
1 SOLUTION/ DROPS OPHTHALMIC AT BEDTIME
Status: DISCONTINUED | OUTPATIENT
Start: 2018-11-01 | End: 2018-11-02 | Stop reason: HOSPADM

## 2018-11-01 RX ORDER — HYDRALAZINE HYDROCHLORIDE 20 MG/ML
10 INJECTION INTRAMUSCULAR; INTRAVENOUS EVERY 6 HOURS PRN
Status: DISCONTINUED | OUTPATIENT
Start: 2018-11-01 | End: 2018-11-02 | Stop reason: HOSPADM

## 2018-11-01 RX ORDER — LORAZEPAM 0.5 MG/1
0.5 TABLET ORAL EVERY 4 HOURS PRN
Status: DISCONTINUED | OUTPATIENT
Start: 2018-11-01 | End: 2018-11-02 | Stop reason: HOSPADM

## 2018-11-01 RX ORDER — HEPARIN SODIUM (PORCINE) LOCK FLUSH IV SOLN 100 UNIT/ML 100 UNIT/ML
5 SOLUTION INTRAVENOUS EVERY 8 HOURS
Status: DISCONTINUED | OUTPATIENT
Start: 2018-11-01 | End: 2018-11-01 | Stop reason: HOSPADM

## 2018-11-01 RX ORDER — POTASSIUM CHLORIDE 29.8 MG/ML
20 INJECTION INTRAVENOUS
Status: DISCONTINUED | OUTPATIENT
Start: 2018-11-01 | End: 2018-11-02 | Stop reason: HOSPADM

## 2018-11-01 RX ORDER — TIMOLOL MALEATE 5 MG/ML
1 SOLUTION/ DROPS OPHTHALMIC DAILY
Status: DISCONTINUED | OUTPATIENT
Start: 2018-11-01 | End: 2018-11-02 | Stop reason: HOSPADM

## 2018-11-01 RX ORDER — HEPARIN SODIUM,PORCINE 10 UNIT/ML
5-10 VIAL (ML) INTRAVENOUS EVERY 24 HOURS
Status: DISCONTINUED | OUTPATIENT
Start: 2018-11-01 | End: 2018-11-02 | Stop reason: HOSPADM

## 2018-11-01 RX ORDER — ZOLPIDEM TARTRATE 5 MG/1
10 TABLET ORAL
Status: DISCONTINUED | OUTPATIENT
Start: 2018-11-01 | End: 2018-11-02 | Stop reason: HOSPADM

## 2018-11-01 RX ORDER — POTASSIUM CHLORIDE 1.5 G/1.58G
20-40 POWDER, FOR SOLUTION ORAL
Status: DISCONTINUED | OUTPATIENT
Start: 2018-11-01 | End: 2018-11-02 | Stop reason: HOSPADM

## 2018-11-01 RX ADMIN — ZOLPIDEM TARTRATE 10 MG: 5 TABLET, FILM COATED ORAL at 20:47

## 2018-11-01 RX ADMIN — PANTOPRAZOLE SODIUM 40 MG: 40 INJECTION, POWDER, FOR SOLUTION INTRAVENOUS at 23:16

## 2018-11-01 RX ADMIN — SODIUM CHLORIDE: 9 INJECTION, SOLUTION INTRAVENOUS at 19:00

## 2018-11-01 RX ADMIN — LORAZEPAM 0.5 MG: 0.5 TABLET ORAL at 20:46

## 2018-11-01 RX ADMIN — Medication: at 20:54

## 2018-11-01 RX ADMIN — SODIUM CHLORIDE 1000 ML: 9 INJECTION, SOLUTION INTRAVENOUS at 13:02

## 2018-11-01 RX ADMIN — HEPARIN 5 ML: 100 SYRINGE at 16:46

## 2018-11-01 RX ADMIN — SODIUM CHLORIDE 1000 ML: 9 INJECTION, SOLUTION INTRAVENOUS at 19:00

## 2018-11-01 RX ADMIN — SODIUM CHLORIDE 1000 ML: 9 INJECTION, SOLUTION INTRAVENOUS at 14:36

## 2018-11-01 RX ADMIN — HEPARIN 5 ML: 100 SYRINGE at 12:48

## 2018-11-01 RX ADMIN — GABAPENTIN 600 MG: 300 CAPSULE ORAL at 20:36

## 2018-11-01 ASSESSMENT — ACTIVITIES OF DAILY LIVING (ADL)
BATHING: 0 - INDEPENDENT
CURRENT_FUNCTIONAL_LEVEL_COMMENT: 0
TOILETING: 0 - INDEPENDENT
DRESS: 0 - INDEPENDENT
SWALLOWING: 0 - SWALLOWS FOODS/LIQUIDS WITHOUT DIFFICULTY
TOILETING: 0-->INDEPENDENT
AMBULATION: 0 - INDEPENDENT
SWALLOWING: 0-->SWALLOWS FOODS/LIQUIDS WITHOUT DIFFICULTY
FALL_HISTORY_WITHIN_LAST_SIX_MONTHS: NO
COGNITION: 0 - NO COGNITION ISSUES REPORTED
WHICH_OF_THE_ABOVE_FUNCTIONAL_RISKS_HAD_A_RECENT_ONSET_OR_CHANGE?: TRANSFERRING
COMMUNICATION: 0 - UNDERSTANDS/COMMUNICATES WITHOUT DIFFICULTY
CHANGE_IN_FUNCTIONAL_STATUS_SINCE_ONSET_OF_CURRENT_ILLNESS/INJURY: YES
RETIRED_COMMUNICATION: 0-->UNDERSTANDS/COMMUNICATES WITHOUT DIFFICULTY
BATHING: 0-->INDEPENDENT
EATING: 0 - INDEPENDENT
TRANSFERRING: 0-->INDEPENDENT
DRESS: 0-->INDEPENDENT
NUMBER_OF_TIMES_PATIENT_HAS_FALLEN_WITHIN_LAST_SIX_MONTHS: 0
RETIRED_EATING: 0-->INDEPENDENT
AMBULATION: 0-->INDEPENDENT
ADLS_ACUITY_SCORE: 15
TRANSFERRING: 0 - INDEPENDENT

## 2018-11-01 ASSESSMENT — PAIN DESCRIPTION - DESCRIPTORS: DESCRIPTORS: ACHING

## 2018-11-01 ASSESSMENT — PAIN SCALES - GENERAL: PAINLEVEL: SEVERE PAIN (6)

## 2018-11-01 NOTE — IP AVS SNAPSHOT
MRN:[id number]                      After Visit Summary   11/1/2018    Reuben Padilla    MRN: [id number]           Thank you!     Thank you for choosing [facility] for your care. Our goal is always to provide you with excellent care. Hearing back from our patients is one way we can continue to improve our services. Please take a few minutes to complete the written survey that you may receive in the mail after you visit with us. Thank you!        Patient Information     Date Of Birth          1960        Designated Caregiver       Most Recent Value    Caregiver    Will someone help with your care after discharge? yes    Name of designated caregiver  clara ortiz     Phone number of caregiver [phone]    Caregiver address [facility]      About your hospital stay     You were admitted on:  November 1, 2018 You last received care in the:  [address] [facility]    You were discharged on:  November 2, 2018        Reason for your hospital stay       You were hospitalized for esophageal obstruction. Unable to place a feeding tube due to low counts and scheduling conflict with Thoracic Surgery team.  Started Neupogen in hospital. You will need Neupogen on Saturday 11/3 and Sunday 11/4, and then have labs checked on Monday morning, 11/5.                  Who to Call     For medical emergencies, please call 911.  For non-urgent questions about your medical care, please call your primary care provider or clinic, None  For questions related to your surgery, please call your surgery clinic        Attending Provider     Provider Carlene George MD Hematology & Oncology       Primary Care Provider Fax #    Physician No Ref-Primary [phone]       When to contact your care team       Please call the [facility] [phone] for fever (temp >100.4), chills, uncontrolled nausea, vomiting, constipation, or diarrhea, unrelieved pain, bleeding not  relieved with pressure, dizziness, chest pain, shortness of breath, loss of consciousness, and any new or concerning symptoms.                  After Care Instructions     Activity       Your activity upon discharge: activity as tolerated            Diet       Follow this diet upon discharge: Full liquid            Discharge Instructions       HOLD LOVENOX Sunday night and Monday morning                  Follow-up Appointments     Adult [facility] Follow-up and recommended labs and tests       You will need Neupogen on Saturday 11/3 and Sunday 11/4, and then have labs checked on Monday morning, 11/5.  I sent a message to Dr. Dee asking her to please follow up on your counts on Monday morning to ensure it is safe to proceed with surgery.    Appointments on [facility] and/or [facility] (with [facility] or [facility] provider or service). Call [phone] if you haven't heard regarding these appointments within 7 days of discharge.                  Your next 10 appointments already scheduled     Nov 03, 2018 12:30 PM CDT   (Arrive by 12:15 PM)   INJECTION with  Oncology Injection Nurse   [facility]    [address] [place] [address]  [address]  [facility] [address]   [phone]            Nov 04, 2018 12:30 PM CST   (Arrive by 12:15 PM)   INJECTION with Uc Oncology Injection Nurse   [facility] ([facility])    [address] [place] [address]  [address]  [facility] [address]   [phone]            Nov 05, 2018  8:15 AM CST   [facility] Lab Draw with [facility] LAB DRAW   [facility] ([facility])    [address] [place] [address]  [address]  [facility] [address]   [phone]            Nov 05, 2018   Procedure with Lyn WESIS MD   [facility], [place], Same Day Surgery (--)    [address] [facility] [address]   [phone]            Nov 13, 2018  9:00 AM CST   [facility] Lab Draw with  [facility]  DRAW   [facility]    [address]  [address]  [facility] [address]   [phone]            Nov 13, 2018  9:30 AM CST   (Arrive by 9:15 AM)   Return Visit with Loraine Sutherland PA-C   [facility] ([facility])    [address]  [address]  [facility] [address]   [phone]            Nov 13, 2018 12:30 PM CST   Infusion 60 with UC ONCOLOGY INFUSION, UC 22 ATC   [facility] ([facility])    [address]  [address]  [facility] [address]   [phone]            Nov 20, 2018 11:00 AM CST   (Arrive by 10:45 AM)   New Patient Visit with Nieves Butcher MD   [facility] ([facility])    [address] [place] [address]  [address]  [facility] [address]   [phone]            Dec 03, 2018  8:00 AM CST   [facility] Lab Draw with  MAS[facility] LAB DRAW   [facility] Lab Draw ([facility])    [address]  [address]  [facility] [address] [phone]              Additional Services     Home infusion referral       Your provider has referred you to: [facility]  Phone  [phone]  Fax  [phone]    For enteral nutrition (once PEG tube placed)    Local Address (if different from home address): N/A    Anticipated Length of Therapy: per provider orders    Home Infusion Pharmacist to adjust therapy based on labs and clinical assessments: Yes    Labs:  [place] draw labs from Venous Catheter: Yes  Home Infusion Pharmacist to order labs based on therapy type and clinical assessments: Yes  Call/Fax Lab Results to: [facility]  Fax: [phone]    Agency Staff to assess nursing needs for Enteral nutrition therapy.                  Future tests that were ordered for you     CBC with platelets differential       Last Lab Result: Hemoglobin (g/dL)        Date                     Value                 11/02/2018               10.8 (L)         ----------            Comprehensive metabolic panel                 Pending Results     Date and Time Order Name Status Description    11/2/2018 1034 Blood Morphology Pathologist Review In process             Statement of Approval     Ordered          11/02/18 1620  I have reviewed and agree with all the recommendations and orders detailed in this document.  EFFECTIVE NOW     Approved and electronically signed by:  Radha Camejo PA-C             Admission Information     Date & Time Provider Department Dept. Phone    11/1/2018 Carlene Bryant MD [address] [facility] [phone]      Your Vitals Were     Blood Pressure Pulse Temperature Respirations Weight Pulse Oximetry    129/88 (BP Location: Right arm) 103 96.2  F (35.7  C) (Oral) 18 157.2 kg (346 lb 9.6 oz) 98%    BMI (Body Mass Index)                   40.06 kg/m2           MyChart Information     [employer] gives you secure access to your electronic health record. If you see a primary care provider, you can also send messages to your care team and make appointments. If you have questions, please call your primary care clinic.  If you do not have a primary care provider, please call [phone] and they will assist you.        Care EveryWhere ID     This is your Care EveryWhere ID. This could be used by other organizations to access your [facility] medical records  [id number]        Equal Access to Services     OLLIE SHARPE : Hadii antonio navarroo Sonigel, waaxda luqadaha, qaybta kaalmada adeegyajuancarlos, geraldo [place] . So [facility] [phone].    ATENCIÓN: Si habla español, tiene a alamo disposición servicios gratuitos de asistencia lingüística. Llame al [phone].    We comply with applicable federal civil rights laws and [place] laws. We do not discriminate on the basis of race, color, national origin, age, disability, sex, sexual orientation, or  gender identity.               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 If we are uncertain of the size of tablets/capsules you have at home, strength may be listed as something that might have changed.        Dose / Directions    gabapentin 300 MG capsule   Commonly known as:  NEURONTIN   This may have changed:    - how much to take  - when to take this  - additional instructions   Used for:  Chemotherapy-induced neuropathy (H)        Two tablets in the am and one tablet in the afternoon and evening   Quantity:  180 capsule   Refills:  1         CONTINUE these medicines which have NOT CHANGED        Dose / Directions    diphenoxylate-atropine 2.5-0.025 MG per tablet   Commonly known as:  LOMOTIL   Used for:  Diarrhea, unspecified type        Dose:  1 tablet   Take 1 tablet by mouth 4 times daily as needed for diarrhea   Quantity:  40 tablet   Refills:  1       enoxaparin 150 MG/ML injection   Commonly known as:  LOVENOX   Used for:  Hx of pulmonary embolus        INJECT 0.86ML (130MG) TWICE A DAY FOR ACUTE PULMONARY EMBOLISM AND ESOPHAGEAL MALIGNANCY   Quantity:  60 mL   Refills:  3       fish oil-omega-3 fatty acids 1000 MG capsule        Dose:  2 g   Take 2 g by mouth daily   Refills:  0       ketamine 5% gabapentin 8% lidocaine 2.5% topical PLO cream   Used for:  Cancer of distal third of esophagus (H), Other secondary hypertension        Use small amount topically to feet three times daily   Quantity:  30 g   Refills:  3       latanoprost 0.005 % ophthalmic solution   Commonly known as:  XALATAN   Used for:  Cancer of distal third of esophagus (H)        Dose:  1 drop    Place 1 drop into both eyes At Bedtime   Quantity:  1 Bottle   Refills:  0       lidocaine-prilocaine cream   Commonly known as:  EMLA   Used for:  Cancer of distal third of esophagus (H)        Apply topically as needed for moderate pain   Quantity:  30 g   Refills:  3       lisinopril 40 MG tablet   Commonly known as:  PRINIVIL/ZESTRIL        Dose:  20 mg   Take 0.5 tablets (20 mg) by mouth daily   Quantity:  30 tablet   Refills:  1       * LORazepam 0.5 MG tablet   Commonly known as:  ATIVAN   Used for:  Cancer of distal third of esophagus (H)        Dose:  0.5 mg   Take 1 tablet (0.5 mg) by mouth every 4 hours as needed (Anxiety, Nausea/Vomiting or Sleep)   Quantity:  30 tablet   Refills:  2       * LORazepam 0.5 MG tablet   Commonly known as:  ATIVAN   Used for:  Cancer of distal third of esophagus (H)        Dose:  0.5 mg   Take 1 tablet (0.5 mg) by mouth every 4 hours as needed (Anxiety, Nausea/Vomiting or Sleep)   Quantity:  30 tablet   Refills:  2       metoclopramide 10 MG tablet   Commonly known as:  REGLAN   Used for:  Gastroparesis        Dose:  10 mg   Take 1 tablet (10 mg) by mouth 3 times daily   Quantity:  120 tablet   Refills:  3       Multi-vitamin Tabs tablet        Dose:  1 tablet   Take 1 tablet by mouth daily   Refills:  0       omeprazole 40 MG capsule   Commonly known as:  priLOSEC   Used for:  Cancer of distal third of esophagus (H)        Dose:  40 mg   Take 1 capsule (40 mg) by mouth daily   Quantity:  90 capsule   Refills:  1       ondansetron 8 MG tablet   Commonly known as:  ZOFRAN   Used for:  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)        Dose:  8 mg   Take 1 tablet (8 mg) by mouth every 8 hours as needed (Nausea/Vomiting)   Quantity:  30 tablet   Refills:  2       prochlorperazine 10 MG tablet   Commonly known as:  COMPAZINE   Used for:  Cancer of distal third of esophagus (H)        Dose:  10 mg   Take 1 tablet (10 mg) by mouth every 6 hours as needed (Nausea/Vomiting)    Quantity:  30 tablet   Refills:  2       tadalafil 5 MG tablet   Commonly known as:  CIALIS   Used for:  Malignant neoplasm of lower third of esophagus (H), Drug-induced erectile dysfunction        Dose:  10 mg   Take 2 tablets (10 mg) by mouth every 24 hours As needed for sexual activity   Quantity:  20 tablet   Refills:  1       timolol 0.5 % ophthalmic solution   Commonly known as:  TIMOPTIC   Used for:  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 Read the directions carefully, and ask your doctor or other care provider to review them with you.         Where to get your medicines      These medications were sent to [facility] [place], [place] - [address] [place] [id number]     Phone:  [phone]     filgrastim 480 MCG/0.8ML Sosy syringe                Protect others around you: Learn how to safely use, store and throw away your medicines at www.disposemymeds.org.             Medication List: This is a list of all your medications and when to take them. Check marks below indicate your daily home schedule. Keep this list as a reference.      Medications           Morning Afternoon Evening Bedtime As Needed    diphenoxylate-atropine 2.5-0.025 MG per tablet   Commonly known as:  LOMOTIL   Take 1 tablet by mouth 4 times daily as needed for diarrhea                                enoxaparin 150 MG/ML injection   Commonly known as:  LOVENOX   INJECT 0.86ML (130MG) TWICE A DAY FOR ACUTE PULMONARY EMBOLISM AND ESOPHAGEAL MALIGNANCY                                filgrastim 480 MCG/0.8ML Sosy syringe   Commonly  known as:  NEUPOGEN   Inject 0.8 mLs (480 mcg) Subcutaneous daily for 3 days                                fish oil-omega-3 fatty acids 1000 MG capsule   Take 2 g by mouth daily                                gabapentin 300 MG capsule   Commonly known as:  NEURONTIN   Two tablets in the am and one tablet in the afternoon and evening   Last time this was given:  300 mg on 11/2/2018 11:56 AM                                ketamine 5% gabapentin 8% lidocaine 2.5% topical PLO cream   Use small amount topically to feet three times daily   Last time this was given:  11/2/2018  3:18 PM                                latanoprost 0.005 % ophthalmic solution   Commonly known as:  XALATAN   Place 1 drop into both eyes At Bedtime                                lidocaine-prilocaine cream   Commonly known as:  EMLA   Apply topically as needed for moderate pain                                lisinopril 40 MG tablet   Commonly known as:  PRINIVIL/ZESTRIL   Take 0.5 tablets (20 mg) by mouth daily                                * LORazepam 0.5 MG tablet   Commonly known as:  ATIVAN   Take 1 tablet (0.5 mg) by mouth every 4 hours as needed (Anxiety, Nausea/Vomiting or Sleep)   Last time this was given:  0.5 mg on 11/1/2018  8:46 PM                                * LORazepam 0.5 MG tablet   Commonly known as:  ATIVAN   Take 1 tablet (0.5 mg) by mouth every 4 hours as needed (Anxiety, Nausea/Vomiting or Sleep)   Last time this was given:  0.5 mg on 11/1/2018  8:46 PM                                metoclopramide 10 MG tablet   Commonly known as:  REGLAN   Take 1 tablet (10 mg) by mouth 3 times daily                                Multi-vitamin Tabs tablet   Take 1 tablet by mouth daily                                omeprazole 40 MG capsule   Commonly known as:  priLOSEC   Take 1 capsule (40 mg) by mouth daily                                ondansetron 8 MG tablet   Commonly known as:  ZOFRAN   Take 1 tablet (8 mg) by mouth every 8  hours as needed (Nausea/Vomiting)                                prochlorperazine 10 MG tablet   Commonly known as:  COMPAZINE   Take 1 tablet (10 mg) by mouth every 6 hours as needed (Nausea/Vomiting)                                tadalafil 5 MG tablet   Commonly known as:  CIALIS   Take 2 tablets (10 mg) by mouth every 24 hours As needed for sexual activity                                timolol 0.5 % ophthalmic solution   Commonly known as:  TIMOPTIC   Place into both eyes daily                                zolpidem 10 MG tablet   Commonly known as:  AMBIEN   Take 1 tablet (10 mg) by mouth nightly as needed   Last time this was given:  10 mg on 11/1/2018  8:47 PM                                * Notice:  This list has 2 medication(s) that are the same as other medications prescribed for you. Read the directions carefully, and ask your doctor or other care provider to review them with you.

## 2018-11-01 NOTE — MR AVS SNAPSHOT
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 Pt not feeling well, complains of joint pain. Pt states he has not been able to eat much due to food and thick liquids getting stuck in his throat.    R-(recommendations): Admit to 7D for G-tube placement tomorrow.            Follow-ups after your visit        Your next 10 appointments already scheduled     Nov 05, 2018   Procedure with Lyn WEISS MD   [facility], [place], Same Day Surgery (--)    [address] [address]   [phone]            Nov 13, 2018  9:00 AM CST   [facility] Draw with  [facility] DRAW   [facility] Draw ([facility])    [address] [place] [address]  [address]  [facility] [place] [address]   [phone]            Nov 13, 2018  9:30 AM CST   (Arrive by 9:15 AM)   Return Visit with Loraine Sutherland PA-C   [facility] ([facility])    [address] [place] [address]  [address]  [facility] [place] [address]   [phone]            Nov 13, 2018 12:30 PM CST   Infusion 60 with UC ONCOLOGY INFUSION, UC 22 ATC   [facility] ([facility])    [address] [place] [address]  [address]  [facility] [place] [address]   [phone]            Nov 20, 2018 11:00 AM CST   (Arrive by 10:45 AM)   New Patient Visit with Nieves Butcher MD   [facility] ([facility])    [address] [place] [address]  [address]  [address]  [facility] [address]   [phone]            Dec 03, 2018  8:00 AM CST   [facility] Lab Draw with [facility] DRAW   [facility] Draw ([facility])    [address]  [address]  [facility] [address]   [phone]            Dec 03, 2018  8:30 AM CST   (Arrive by 8:15 AM)   Return Visit with Kadi Dee MD   [facility] ([facility])    [address]  [address]  [facility] [address]   [phone]            Dec 03, 2018 10:00 AM CST   Infusion 60 with UC ONCOLOGY INFUSION, UC 29 ATC   [facility] ([facility])    [address]  [address]  [facility] [address]   [phone]              Future tests that were ordered for you today     Open Standing Orders        Priority Remaining Interval Expires Ordered    Activity: Up with assist Routine 84002/23027 PRN  11/1/2018    Daily weights Routine 1/1 DAILY  11/1/2018    Oxygen: Nasal cannula, Oxygen mask Routine [id number] CONTINUOUS  11/1/2018    NPO per Anesthesia Guidelines for Procedure/Surgery Except for: No Exceptions Routine 1/1 DIET EFFECTIVE MIDNIGHT  11/1/2018    Comprehensive metabolic panel Routine 1/1 AM DRAW  11/1/2018    Magnesium Routine 1/1 AM DRAW  11/1/2018    Phosphorus Routine 1/1 AM DRAW  11/1/2018    CBC with platelets Routine 1/1 AM DRAW  11/1/2018    Partial thromboplastin time Routine 1/1 AM DRAW  11/1/2018    INR Routine 1/1 AM DRAW  11/1/2018    Blood culture Routine 100/100 CONDITIONAL (SPECIFY)  11/1/2018    Blood culture Routine 100/100 CONDITIONAL (SPECIFY)  11/1/2018    Potassium Routine 100/100 CONDITIONAL (SPECIFY)  11/1/2018    Magnesium Routine 100/100 CONDITIONAL (SPECIFY)  11/1/2018    Troponin I Timed 2/3 EVERY 6 HOURS  11/1/2018          Open Future Orders        Priority Expected Expires Ordered    IR Gastro Jejunostomy Tube Placement Routine   11/1/2019 11/1/2018            Who to contact     If you have questions or need follow up information about today's clinic visit or your schedule please contact [facility] directly at [phone].  Normal or non-critical lab and imaging results will be communicated to you by [employer], letter or phone within 4 business days after the clinic has received the results. If you do not hear from us within 7 days, please contact the clinic through [employer] or phone. If you have a critical or abnormal lab result, we will notify you by phone as soon as possible.  Submit refill requests through [employer] or call your pharmacy and they will forward the refill request to us. Please allow 3 business days for your refill to be completed.          Additional Information About Your Visit        [employer] Information     [employer] gives you secure access to your electronic health record. If you see a primary care provider, you can also send messages to your care team and make appointments. If you have questions, please call your primary care clinic.  If you do not have a primary care provider, please call [phone] and they will assist you.        Care EveryWhere ID     This is your Care EveryWhere ID. This could be used by other organizations to access your [facility] medical records  [id number]        Your Vitals Were     Pulse Pulse Oximetry                106 99%           Blood Pressure from Last 3 Encounters:   11/01/18 129/78   11/01/18 (!) 88/66   11/01/18 118/72    Weight from Last 3 Encounters:   11/01/18 (!) 158.4 kg (349 lb 2.2 oz)   11/01/18 (!) 156.1 kg (344 lb 3.2 oz)   10/23/18 (!) 163.2 kg (359 lb 11.2 oz)              Today, you had the following     No orders found for display         Today's Medication Changes          These changes are accurate as of 11/1/18  5:13 PM.  If you have any questions, ask your nurse or doctor.               These medicines have changed or have updated prescriptions.         Dose/Directions    gabapentin 300 MG capsule   Commonly known as:  NEURONTIN   This may have changed:    - how much to take  - when to take this  - additional instructions   Used for:  Chemotherapy-induced neuropathy (H)        Two tablets in the am and one tablet in the afternoon and evening   Quantity:  180 capsule   Refills:  1                Primary Care Provider Fax #    Physician No Ref-Primary [phone]       No address on file        Equal Access to Services     [facility] : Hadii antonio harrison hadasho Sojose antonioali, waaxda luqadaha, qaybta kaalmada adelaurynyada, geraldo junior valorieotilio paintingpalthao [place] . So [facility] [phone].    ATENCIÓN: Si habla español, tiene a alamo disposición servicios gratuitos de asistencia lingüística. Tanisha al [phone].    We comply with applicable federal civil rights laws and [place] laws. We do not discriminate on the basis of race, color, national origin, age, disability, sex, sexual orientation, or gender identity.            Thank you!     Thank you for choosing [facility]  for your care. Our goal is always to provide you with excellent care. Hearing back from our patients is one way we can continue to improve our services. Please take a few minutes to complete the written survey that you may receive in the mail after your visit with us. Thank you!             Your Updated Medication List - Protect others around you: Learn how to safely use, store and throw away your medicines at www.disposemymeds.org.          This list is accurate as of 11/1/18  5:13 PM.  Always use your most recent med list.                   Brand Name Dispense Instructions for use Diagnosis    diphenoxylate-atropine 2.5-0.025 MG per tablet    LOMOTIL    40 tablet    Take 1 tablet by mouth 4 times daily as needed for diarrhea    Diarrhea, unspecified type       * enoxaparin 80 MG/0.8ML injection    LOVENOX    60 Syringe    Inject 1.3 mLs (130 mg) Subcutaneous 2 times daily    Cancer of  distal third of esophagus (H)       * enoxaparin 150 MG/ML injection    LOVENOX    60 mL    INJECT 0.86ML (130MG) TWICE A DAY FOR ACUTE PULMONARY EMBOLISM AND ESOPHAGEAL MALIGNANCY    Hx of pulmonary embolus       fish oil-omega-3 fatty acids 1000 MG capsule      Take 2 g by mouth daily        gabapentin 300 MG capsule    NEURONTIN    180 capsule    Two tablets in the am and one tablet in the afternoon and evening    Chemotherapy-induced neuropathy (H)       ketamine 5% gabapentin 8% lidocaine 2.5% topical PLO cream     30 g    Use small amount topically to feet three times daily    Cancer of distal third of esophagus (H), Other secondary hypertension       latanoprost 0.005 % ophthalmic solution    XALATAN    1 Bottle    Place 1 drop into both eyes At Bedtime    Cancer of distal third of esophagus (H)       lidocaine-prilocaine cream    EMLA    30 g    Apply topically as needed for moderate pain    Cancer of distal third of esophagus (H)       lisinopril 40 MG tablet    PRINIVIL/ZESTRIL    30 tablet    Take 0.5 tablets (20 mg) by mouth daily        * LORazepam 0.5 MG tablet    ATIVAN    30 tablet    Take 1 tablet (0.5 mg) by mouth every 4 hours as needed (Anxiety, Nausea/Vomiting or Sleep)    Cancer of distal third of esophagus (H)       * LORazepam 0.5 MG tablet    ATIVAN    30 tablet    Take 1 tablet (0.5 mg) by mouth every 4 hours as needed (Anxiety, Nausea/Vomiting or Sleep)    Cancer of distal third of esophagus (H)       metoclopramide 10 MG tablet    REGLAN    120 tablet    Take 1 tablet (10 mg) by mouth 3 times daily    Gastroparesis       Multi-vitamin Tabs tablet      Take 1 tablet by mouth daily        omeprazole 40 MG capsule    priLOSEC    90 capsule    Take 1 capsule (40 mg) by mouth daily    Cancer of distal third of esophagus (H)       ondansetron 8 MG tablet    ZOFRAN    30 tablet    Take 1 tablet (8 mg) by mouth every 8 hours as needed (Nausea/Vomiting)    Cancer of distal third of esophagus (H)        prochlorperazine 10 MG tablet    COMPAZINE    30 tablet    Take 1 tablet (10 mg) by mouth every 6 hours as needed (Nausea/Vomiting)    Cancer of distal third of esophagus (H)       tadalafil 5 MG tablet    CIALIS    20 tablet    Take 2 tablets (10 mg) by mouth every 24 hours As needed for sexual activity    Malignant neoplasm of lower third of esophagus (H), Drug-induced erectile dysfunction       timolol 0.5 % ophthalmic solution    TIMOPTIC     Place into both eyes daily    Cancer of distal third of esophagus (H)       zolpidem 10 MG tablet    AMBIEN    60 tablet    Take 1 tablet (10 mg) by mouth nightly as needed    Metastasis from gastric cancer (H)       * Notice:  This list has 4 medication(s) that are the same as other medications prescribed for you. Read the directions carefully, and ask your doctor or other care provider to review them with you.

## 2018-11-01 NOTE — NURSING NOTE
Chief Complaint   Patient presents with     Port Draw     Labs drawn from port by RN. Line heparin locked. Vs taken. Paged Katalina BERENICE re: hypotensive BP. Pt feeling very fatigued and 'lousy' today. Pt brought over to infusion via wheelchair.     Labs drawn from port by RN. Line flushed with saline and heparin. Vs taken. Paged Katalina BERENICE re: hypotensive BP. Pt feeling very fatigued and 'lousy' today. Pt brought over to infusion via wheelchair.    Carley Robles RN

## 2018-11-01 NOTE — PATIENT INSTRUCTIONS
S-(situation): Pt hypotensive and unable to keep food down    B-(background): Pt has cancer of the esophagus    A-(assessment): BP 88/66 in lab, rechecked in infusion 85/62. Pt not feeling well, complains of joint pain. Pt states he has not been able to eat much due to food and thick liquids getting stuck in his throat.    R-(recommendations): Admit to 7D for G-tube placement tomorrow.

## 2018-11-01 NOTE — PROGRESS NOTES
Patient on OR schedule as an add-on for tomorrow (11/2) for EGD possible stent, possible PEG vs lap J. Schedule very full, so there is a possibility it will get delayed but we will try to get case done as soon as possible.    - pre op orders placed  - Will discuss with patient and obtain consent  - please hold lovenox for OR    Melissa Mittal MD  General Surgery, PGY-3  Pg [phone]

## 2018-11-01 NOTE — PROGRESS NOTES
"Infusion Nursing Note:  Reuben Padilla presents today for IVF.    Patient seen by provider today: Yes: Katalina Ramirez PA-C   present during visit today: Not Applicable.    Note: Pt arrives to infusion for IVF with his sister after lab found him to be hypotensive. BP 88/66 in lab. Pt states he has not been able eat much because food or thick liquids get stuck in his throat. He has been mainly eating broth soups. Katalina assessed patient during infusion today.   Per Katalina, give 1 liter NS.  /83   post 1 liter, pt states he is feeling more \"with it\" now.  Per Katalina ok to hang an additional liter, pt to be admitted for G-tube placement tomorrow. Get EKG while patient is waiting to be admitted. Per Katalina okay for patient to be driven by his sister or take the shuttle to the hospital if BP is WNL.   EKG completed and Katalina aware of atrial fibrillation results. Pt given 1.5 liter NS prior to being discharged. /72 at time of discharge.    Intravenous Access:  Implanted Port.    Treatment Conditions:  Lab Results   Component Value Date    HGB 11.7 11/01/2018     Lab Results   Component Value Date    WBC 2.7 11/01/2018      Lab Results   Component Value Date    ANEU 1.5 11/01/2018     Lab Results   Component Value Date     11/01/2018      Lab Results   Component Value Date     11/01/2018                   Lab Results   Component Value Date    POTASSIUM 4.3 11/01/2018           Lab Results   Component Value Date    MAG 1.9 07/17/2018            Lab Results   Component Value Date    CR 1.29 11/01/2018                   Lab Results   Component Value Date    NIDHI 8.6 11/01/2018                Lab Results   Component Value Date    BILITOTAL 1.5 11/01/2018           Lab Results   Component Value Date    ALBUMIN 3.5 11/01/2018                    Lab Results   Component Value Date    ALT 20 11/01/2018           Lab Results   Component Value Date    AST 21 11/01/2018         Post Infusion " Assessment:  Patient tolerated infusion without incident.  Blood return noted pre and post infusion.  Site patent and intact, free from redness, edema or discomfort.  No evidence of extravasations.  Access left in place for admission.    Discharge Plan:   Pt wheeled to lobby. Pt and sister will take shuttle to hospital for admission to 7D.    Suha Morales RN

## 2018-11-01 NOTE — H&P
-- History and Physical -- Hematology / Oncology  Date of Admission:  11/01/18 -- Date of Service (when I saw the patient): 11/01/18    ASSESSMENT & PLAN  Reuben Padilla is a 58 year old male with metastatic esophageal adenocarcinoma, with progressive disease on multiple lines of therapy, including FOLFOX, Taxol/Cyramza, crizotinib, and again on Taxol/Cyramza, who was recently started on palliative Keytruda on 10/23, who presents from clinic with symptoms of esophageal obstruction.    # Progressive dysphagia  # Weight loss, poor appetite  Presented to clinic 11/1 with c/o progressive dysphagia with solids over the last few weeks, characterized by knot in stomach and then regurgitation/self-induced vomiting. 15 lb weight loss in 1 week. XR esophagram performed today, showing mucosal irregularity corresponding to known esophageal mass with contrast passing readily through esophagus into stomach. There was dilation of the distal esophagus and status of contrast, most pronounced in supine position. Per outpatient discussion with radiology, contrast is moving through this area however there is dysmotility in the area of the mass causing dilatation and intermittent stasis. No persistent obstruction but likely having some intermittent obstruction, which is likely causing his symptoms.   Last upper endoscopy done 10/10/18 showing mild extrinsic compression of the distal esophagus, a small non-obstructing lesion at the GEJ, and evidence of gastroparesis, had been started on a trial of reglan.   Has had discussion re: PEG placement in the past but has declined. Today he is agreeable to have PEG placed and Dr. Fortune contacted and willing to perform the procedure tomorrow, with inpatient admission before and after to monitor hydration and nutrition pre and post PEG placement.   - Thoracic surgery consult at admission - he is an add on for tomorrow  - Full liquid diet for now, NPO post MN  - IVFs  - Protonix IV  - Will hold off  on reglan   - If he is unable to get the PEG placement tomorrow he would rather be discharged for the weekend. He has a definite time slot on Monday for PEG placement.      # Lightheadedness over the last 2 weeks, with presyncopal symptoms  Stopped amlodipine and cut lisinopril to 20mg daily as instructed 10/31  - Monitor blood pressures and orthostatics  - Hold lisinopril and amlodipine for now  - Hydralazine prn    # History of paroxysmal Afib  Current EKG with a-fib and tachycardia 110's likely exacerbated by dehydration  Will monitor HR after hydration    # ABDULLAHI  Baseline Cr is 0.7-0.8, increased to 1.29, likely from dehydration  NS 1.5L given in clinic today  Will give another 1L NS now, followed by NS @ 125  Monitor Cr and lytes    # Right shoulder pain spreading to left shoulder area and wrapping around to left chest wall  - Lipase was normal  - Possibly secondary to tumor progression vs. Subtherapeutic anticoagulation  - Follow troponin x 3 overnight    # Joint pain - hands and knees, possibly from Keytruda    # Metastatic esophageal adenocarcinoma, liver metastases, widespread lavon metastasis  Tumor is positive for cytokeratin 20, negative for P63 and CK7, and HER2 negative. Started on FOLFOX on 5/15/17. Delay in treatment from 9/5-10/2/17 due to work related injury. He had an excellent response by imaging throughout the summer 2017, but a mixed response by imaging in late fall 2017. In January 2018, he was switched to Taxol/Cyramza with slight progression and neuropathy and clinical trial became available. In March 2018, he was started on the [employer] Match Clinical Trial with crizotinib (MET amplification). He remained on crizotinib March - July 2018, but was found to have progressive disease. He switched to Taxol/cyramza on 7/24/18 and needed treatment modification to days 1,8 every 21 days with cycle 2 due to neutropenia. With progressive disease on taxol/cyramza, he started palliative Keytruda on  "10/23/2018. He has low PDL1 expression.    # History of PE - on lovenox 130mg BID, will hold for likely procedure tomorrow. Of note he has progressed through Xarelto in the past. Will need to check Xa level given weight loss and renal dysfunction when able to restart post procedure.     # Neuropathy - at baseline. Gabapentin 600/300/600 in addition to gabapentin gel.    # Pain Assessment:  Current Pain Score 10/10/2018   Patient currently in pain? denies   Pain location -   Pain descriptors -   Reuben carlos pain level was assessed and he currently denies pain.        FEN  - IVFs: NS @ 125ml/hr  - Diet: CLD for now, NPO p MN for procedure  - Lytes to be repleted per sliding scale     PPX  - DVT: mechanical for now. Will need to restart therapeutic lovenox as above when cleared post procedure  - GI: protonix IV BID    Lines/Drains: port  Consults: thoracic  CODE: DNR per patient - see discussion below in HPI  DISPO: inpatient admission, planned for >2 nights, will likely stay through the weekend to monitor nutrition post procedure    CHIEF COMPLAINT/REASON FOR ADMIT: progressive dysphagia.    HISTORY OF PRESENT ILLNESS  History obtained from chart review and discussion with the patient.     Reuben Padilla is a 58 year old male with metastatic esophageal adenocarcinoma, with progressive disease on multiple lines of therapy, including FOLFOX, Taxol/Cyramza, crizotinib, and again on Taxol/Cyramza, who was recently started on palliative Keytruda on 10/23, who presents from clinic with symptoms of esophageal obstruction.    Per clinic note, \"Since last visit, Levi has had progressive dysphagia to the point he is unable to keep most solids down. He will trial eating (ranging from pizza, broth with soft noodles, mashed potatoes, shakes), and shortly after, he feels a \"knot\" in his esophagus. His mouth then starts watering, and he regurgitates the food or induces vomiting himself for comfort. He has tried just waiting, and " "eventually the knot will go away in 1-2 hours. He has been trying to keep up with fluids, which do go down OK, he is unsure of how much fluid he gets in. Estimates maybe half gallon but isn't entirely sure about this. Appetite is poor. He has not had fevers, nausea, abdominal pain, or bowel changes.     He was feeling lightheaded intermittently over the last two weeks, to the point where he has \"almost gone down\" several times. Yesterday, instructed to stop amlodipine and cut lisinopril to 20 mg. He does not feel lightheaded right now. He does feel weak.     Starting yesterday, he developed pain in his joints involving his hands bilaterally and his knees. Feels achy. He has some old pain medications that he has been trying, which haven't been too helpful.      Over the last 2 weeks, he has had three to four episodes of pain that starts in his right shoulder (where he chronically has pain secondary to rotator cuff issues), but now the pain spreads to his left shoulder area and wraps around his left chest wall. It will last for several hours, and then spontaneously resolves. He has been compliant with Lovenox and has not had any missed doses other than this morning prior to his procedure. He has not felt short of breath. No radiating pains down his arms or up his neck. He feels fine right now.\"    In clinic he was given 2 L NS, and underwent Xray esophagram. Discussion was had with him and he agreed to have PEG placement which Dr. Fortune was able to arrange for tomorrow 11/2. He is now admitted for pre and post procedure hydration and nutrition management.     Currently, he confirms the history from clinic above. He is feeling a bit better and his BP is better after NS x 1.5L in clinic. However, he still hasn't urinated today. He denies dizziness/lightheadedness/palpitations at the moment. We discussed his DNR status - he does not want to be resuscitated as he realizes that his disease is incurable, however he does agree " to have his DNR rescinded during the time of surgery as is standard.       PMH  Past Medical History:   Diagnosis Date     Arrhythmia      Cancer (H)     esophageal     Glaucoma      Hypertension      Paroxysmal A-fib (H)      Tubular adenoma 02/2017       PSH  Past Surgical History:   Procedure Laterality Date     AMPUTATION      toe     ARTHROSCOPY KNEE       bunion surgery       CHOLECYSTECTOMY       COLONOSCOPY  02/2017    remove 2 polyps     ESOPHAGOSCOPY, GASTROSCOPY, DUODENOSCOPY (EGD), COMBINED N/A 10/10/2018    Procedure: COMBINED ESOPHAGOSCOPY, GASTROSCOPY, DUODENOSCOPY (EGD);  Esophagogastroduodenoscopy ;  Surgeon: Leonel Joel MD;  Location: [facility] OR     INSERT PORT VASCULAR ACCESS Right 5/9/2017    Procedure: INSERT PORT VASCULAR ACCESS;  Single Lumen Chest Power Port;  Surgeon: Jasiel Hu PA-C;  Location:  OR       Prior to Admission MEDICATIONS  Cannot display prior to admission medications because the patient has not been admitted in this contact.     Allergies   Allergies   Allergen Reactions     Penicillin G Other (See Comments)     Pt not sure-     Penicillins Unknown       Social History  Social History     Social History     Marital status: Single     Spouse name: N/A     Number of children: N/A     Years of education: N/A     Occupational History     Not on file.     Social History Main Topics     Smoking status: Former Smoker     Packs/day: 1.00     Years: 20.00     Types: Cigarettes     Quit date: 1/1/2006     Smokeless tobacco: Never Used     Alcohol use Yes      Comment: occasional     Drug use: No      Comment: h/o over 30 years ago     Sexual activity: Not on file     Other Topics Concern     Not on file     Social History Narrative       Family History  No family history on file.  - Family history reviewed & no other pertinent oncologic/hematologic malignancy noted    ROS  Comprehensive ROS was performed and was negative unless otherwise noted in above  HPI.    Physical Exam  Temp:  [98.2  F (36.8  C)] 98.2  F (36.8  C)  Pulse:  [] 103  Resp:  [16] 16  BP: ()/(62-82) 123/82  SpO2:  [97 %-99 %] 99 %  0 lbs 0 oz    Constitutional: Awake, alert, cooperative, obese male, in NAD.  Eyes: PERRL, EOMI, sclera clear, conjunctiva normal.  ENT: Normocephalic, sinuses nontender on palpation, oral pharynx with moist mucus membranes  Respiratory: Non-labored breathing, good air exchange, clear to auscultation bilaterally, no crackles or wheezing.  Cardiovascular: RRR, no murmur noted.  GI: + bowel sounds, soft, non-distended, non-tender, no masses palpated, no hepatosplenomegaly.  Skin: No concerning lesions or rash on exposed areas.  Musculoskeletal: 1+ edema elton LEs.  Neurologic: Awake, alert & oriented x3.  Cranial nerves II-XII are grossly intact.   Psych: appropriate affect    DATA  Results for orders placed or performed in visit on 11/01/18 (from the past 24 hour(s))   CBC with platelets differential   Result Value Ref Range    WBC 2.7 (L) 4.0 - 11.0 10e9/L    RBC Count 4.18 (L) 4.4 - 5.9 10e12/L    Hemoglobin 11.7 (L) 13.3 - 17.7 g/dL    Hematocrit 37.6 (L) 40.0 - 53.0 %    MCV 90 78 - 100 fl    MCH 28.0 26.5 - 33.0 pg    MCHC 31.1 (L) 31.5 - 36.5 g/dL    RDW 17.0 (H) 10.0 - 15.0 %    Platelet Count 129 (L) 150 - 450 10e9/L    Diff Method Automated Method     % Neutrophils 56.6 %    % Lymphocytes 31.6 %    % Monocytes 9.8 %    % Eosinophils 0.8 %    % Basophils 0.8 %    % Immature Granulocytes 0.4 %    Nucleated RBCs 0 0 /100    Absolute Neutrophil 1.5 (L) 1.6 - 8.3 10e9/L    Absolute Lymphocytes 0.8 0.8 - 5.3 10e9/L    Absolute Monocytes 0.3 0.0 - 1.3 10e9/L    Absolute Eosinophils 0.0 0.0 - 0.7 10e9/L    Absolute Basophils 0.0 0.0 - 0.2 10e9/L    Abs Immature Granulocytes 0.0 0 - 0.4 10e9/L    Absolute Nucleated RBC 0.0    Comprehensive metabolic panel   Result Value Ref Range    Sodium 141 133 - 144 mmol/L    Potassium 4.3 3.4 - 5.3 mmol/L    Chloride 109  94 - 109 mmol/L    Carbon Dioxide 23 20 - 32 mmol/L    Anion Gap 9 3 - 14 mmol/L    Glucose 92 70 - 99 mg/dL    Urea Nitrogen 19 7 - 30 mg/dL    Creatinine 1.29 (H) 0.66 - 1.25 mg/dL    GFR Estimate 57 (L) >60 mL/min/1.7m2    GFR Estimate If Black 69 >60 mL/min/1.7m2    Calcium 8.6 8.5 - 10.1 mg/dL    Bilirubin Total 1.5 (H) 0.2 - 1.3 mg/dL    Albumin 3.5 3.4 - 5.0 g/dL    Protein Total 7.0 6.8 - 8.8 g/dL    Alkaline Phosphatase 79 40 - 150 U/L    ALT 20 0 - 70 U/L    AST 21 0 - 45 U/L   Bilirubin direct   Result Value Ref Range    Bilirubin Direct 0.4 (H) 0.0 - 0.2 mg/dL   Lipase   Result Value Ref Range    Lipase 75 73 - 393 U/L   EKG 12-lead complete w/read - Clinics   Result Value Ref Range    Interpretation ECG Click View Image link to view waveform and result        PCP & Hematologist/Oncologist  Physician No Ref-Primary    Beltran Garcia MD  Hematology/Oncology  November 1, 2018

## 2018-11-01 NOTE — LETTER
"11/1/2018      RE: Reuben Padilla  [address] [place] [id number]       HEMATOLOGY/ONCOLOGY PROGRESS NOTE  Nov 1, 2018    REASON FOR VISIT: follow-up metastatic esophogeal cancer    DIAGNOSIS:   Reuben Padilla is a 56 y/o man with metastatic esophogeal cancer with liver metastases and widespread lavon metastasis. His tumor is positive for cytokeratin 20, negative for P63 and CK7, and HER2 is negative. He started on FOLFOX (5FU/oxaliplatin) on 5/15/2017. He had a delay in treatment from 9/5-10/2/17 due to work related injury.    He had an excellent response by imaging throughout the summer 2017.    He a mixed response by imaging in late fall 2017.    In January 2018, he was switched to taxol and cyramza - had slight progression and neuropathy and clinical trial became available.       In March 2018, he was started on the [facility] Match Clinical Trial with crizotinib.  (MET amplification) He remained on crizotinib from March - July 2018. He was found to have progressive disease. He switched to Taxol/cyramza on 7/24/18. His treatment was changed to days 1,8 every 21 days with cycle 2 due to neutropenia.    Progressive disease on Taxol, Cyramaza. Started palliative Keytruda on 10/23/18.      INTERVAL HISTORY: Levi is here on an add-on basis for multiple concerns, including weight loss, difficulties with intake, lightheadedness.      Since last visit, Levi has had progressive dysphagia to the point he is unable to keep most solids down. He will trial eating (ranging from pizza, broth with soft noodles, mashed potatoes, shakes), and shortly after, he feels a \"knot\" in his esophagus. His mouth then starts watering, and he regurgitates the food or induces vomiting himself for comfort. He has tried just waiting, and eventually the knot will go away in 1-2 hours. He has been trying to keep up with fluids, which do go down OK, he is unsure of how much fluid he gets in. Estimates maybe half gallon but isn't entirely " "sure about this. Appetite is poor. He has not had fevers, nausea, abdominal pain, or bowel changes.    He was feeling lightheaded intermittently over the last two weeks, to the point where he has \"almost gone down\" several times. Yesterday, instructed to stop amlodipine and cut lisinopril to 20 mg. He does not feel lightheaded right now. He does feel weak.    Starting yesterday, he developed pain in his joints involving his hands bilaterally and his knees. Feels achy. He has some old pain medications that he has been trying, which haven't been too helpful.     Over the last 2 weeks, he has had three to four episodes of pain that starts in his right shoulder (where he chronically has pain secondary to rotator cuff issues), but now the pain spreads to his left shoulder area and wraps around his left chest wall. It will last for several hours, and then spontaneously resolves. He has been compliant with Lovenox and has not had any missed doses other than this morning prior to his procedure. He has not felt short of breath. No radiating pains down his arms or up his neck. He feels fine right now.    His 10-point review of systems is otherwise negative.        PHYSICAL EXAMINATION  There were no vitals taken for this visit.    Wt Readings from Last 4 Encounters:   11/01/18 (!) 156.1 kg (344 lb 3.2 oz)   10/23/18 (!) 163.2 kg (359 lb 11.2 oz)   10/12/18 (!) 163.8 kg (361 lb 1 oz)   10/10/18 (!) 163.8 kg (361 lb 1.8 oz)     Constitutional: Alert, oriented male in no visible distress.  Eyes: PERRL. Anicteric sclerae.  ENT/Mouth: OM moist and pink without lesions or thrush.  CV:Tachy, sounds regular for short periods of time, then irregularly irregular for >30 seconds  Resp: CTAB throughout  Abdomen: Soft, non-tender, non-distended. Obese. Bowel sounds present. Unable to palpate liver or spleen.   Extremities: 1+ pitting edema  b/l  Skin: Warm, dry. On first glance does appear slightly jaundiced compare to his baseline " complexion  Lymph: No cervical or supraclavicular lymphadenopathy appreciated.   Neuro: CN II-XII grossly intact.  Absent reflexed at knees bilaterally    ECOG PS: 1    LABS:  Results for REUBEN CHANEY (MRN [id number]) as of 11/1/2018 13:44   Ref. Range 10/23/2018 11:54 10/23/2018 13:03 11/1/2018 11:28 11/1/2018 12:51   Creatinine Latest Ref Range: 0.66 - 1.25 mg/dL 0.89   1.29 (H)   Results for REUBEN CHANEY (MRN [id number]) as of 11/1/2018 13:44   Ref. Range 10/23/2018 11:54 10/23/2018 13:03 11/1/2018 11:28 11/1/2018 12:51   Bilirubin Total Latest Ref Range: 0.2 - 1.3 mg/dL 0.6   1.5 (H)   Results for REUBEN CHANEY (MRN [id number]) as of 11/1/2018 13:44   Ref. Range 11/1/2018 12:51   WBC Latest Ref Range: 4.0 - 11.0 10e9/L 2.7 (L)   Hemoglobin Latest Ref Range: 13.3 - 17.7 g/dL 11.7 (L)   Hematocrit Latest Ref Range: 40.0 - 53.0 % 37.6 (L)   Platelet Count Latest Ref Range: 150 - 450 10e9/L 129 (L)     IMAGING:  XR esophogram 11/1/2018    Impression:   1. Mucosal irregularity of the distal esophagus at the  gastroesophageal junction, corresponding to known esophageal mass seen  previously on CT on 10/10/2018. Contrast passed readily through the  esophagus and into the stomach.  2. Dilatation of the distal esophagus and stasis of contrast, which is  pronounced in supine position.     IMPRESSION/PLAN: Reuben Chaney is a 58-year-old with metastatic esophogeal adenocarcinoma, with progressive disease on multiple lines of therapy, including FOLFOX, Taxol/Cyramza, crizotinib, and again on Taxol/Cyramza. Here today on an add on basis for symptoms.    Dysphagia: secondary to his cancer. Upper endoscopy 10/10/18 showing mild extrinsic compression of the distal esophagus, a small, non-obstructing lesion at the GEJ, and evidence of gastroparesis. He was started on a trial of Reglan.     In the last 2 weeks, he has had progressive dysphagia with limited PO intake, associated with 15 lb weight lost in 1 weeks. XR  esophagram was performed today, showing mucosal irregularity corresponding to known esophageal mass with contrast passing readily through esophagus and into the stomach. There was dilation of the distal esophagus and status of contrast, most pronounced in supine position. Discussed with radiologist, contrast is moving through this area however there is dysmotility in the area of the mass causing dilatation and intermittent statis. No persistent obstruction but likely having some intermittent obstruction. Per discussion with radiology, this likely is causing his symptoms.    Had a long discussion with Levi. He has been opposed to having a PEG placed. Expressed my concern over the rapid pace of weight loss, and that even if he will respond to Keytruda to the point that his dysphagia will improve, this likely will happen in weeks to months. I do not think his intake at this time will support him for this time frame. After our discussion, he was agreeable to having a PEG placed. IR unable to place in timely manner, so discussed with Dr. Fortune of Thoracic, who was willing to perform the procedure tomorrow, recommended inpatient admission to optimize hydration and monitor condition. As long as he remains stable, will perform tomorrow. Plan to keep patient through the weekend to monitor nutrition as he starts PEG feeds.    Remainder of plan is as below:    FEN/Renal: Weight loss as above in context of poor PO intake. Creat bump today. Baseline ~0.7-0.8, increased to 1.29 today, likely secondary to poor PO intake. A liter of NS administered in clinic today. Upon completion of that liter, did run slow second liter.    Back/chest wall pain: Occurring 3-4 times in the last 2 weeks, not presently symptomatic. Lipase was checked given location of his mass abutting his pancreas and was normal. I would suspect with the weight loss and renal dysunction taht his Xa is less likey to be subtherapeutic. Unclear etiology but if recurs  would check EKG during active symptoms, could recheck CTPE.    Tachycardia: EKG c/w A fib. Levi reports long history of intermittent A fib, which could be exacerbated by his recent dehydration.    Achiness in hand joints, knee joints bilaterally: could be secondary to Keytruda. Asked that he monitor.    Metastatic esophogeal adenocarcinoma. Most recent imaging 10/2018 showing slight progression of disease, and symptomatically with increased dysphagia. He has low PDL1 expression. He started palliative Keytruda 10/23/2018.    History of pulmonary embolism.  He is on Lovenox 130 mg twice daily.  He has progressed through Xarelto in the past.  Would recommend checking HepXa when we can (anticipate we will be holding this for his procedure, so will need to be after) given his weight loss, renal dysfunction.     Lightheadedness: History of HTN on Cyramza, requiring Amlodopine and Lisinopril 40 mg daily. Yesterday, instructed to stop Amlodopine and decrease Lisinopril to 20 mg. With recent hypotension and symptomatic lightheadedness, suspect that his antihypertensives are still too high. Recommended stopping Lisinopril. Continue to monitor BP.    Neuropathy: He does have baseline neuropathy.  Gabapentin is maintained at 600/300/600 mg daily in addition to gabapentin gel.      Katalina Ramirez PA-C     I spent >40 minutes with the patient, with over 50% of the time spent counseling or coordinating their care as described above.      Katalina Ramirez PA-C

## 2018-11-01 NOTE — MR AVS SNAPSHOT
After Visit Summary   11/1/2018    Reuben Padilla    MRN: [id number]           Patient Information     Date Of Birth          1960        Visit Information        Provider Department      11/1/2018 1:40 PM Katalina Ramirez PA-C [facility]        Today's Diagnoses     Cancer of distal third of esophagus (H)    -  1    Long term current use of anticoagulant therapy        Tachycardia           Follow-ups after your visit        Your next 10 appointments already scheduled     Nov 05, 2018   Procedure with Lyn WEISS MD   [facility], [place], Same Day Surgery (--)    [address] [facility] [address] [facility] [address]   [phone]            Nov 13, 2018  9:00 AM CST   [facility] Draw with  [facility] DRAW   [facility] ([facility])    [address] [place] [address]  [address]  [facility] [address]   [phone]            Nov 13, 2018  9:30 AM CST   (Arrive by 9:15 AM)   Return Visit with Loraine Sutherland PA-C   [facility] ([facility])    [address] [place] [address]  [address]  [facility] [address]   [phone]            Nov 13, 2018 12:30 PM CST   Infusion 60 with UC ONCOLOGY INFUSION, UC 22 ATC   [facility] ([facility])    [address] [place] [address]  [address]  [facility] [address]   [phone]            Nov 20, 2018 11:00 AM CST   (Arrive by 10:45 AM)   New Patient Visit with Nieves Butcher MD   [facility] ([facility])    [address] [place] [address]  [address]  [facility] [address]   [phone]            Dec 03, 2018  8:00 AM CST   [facility] Draw with  [facility] DRAW   [facility] ([facility])    [address] [place] [address]  [address]  [facility] [address]   [phone]            Dec 03, 2018  8:30 AM CST   (Arrive by 8:15 AM)   Return Visit with Kadi  Jordy Dee MD   [facility] ([facility])    [address]  [address]  [facility] [address] [phone]            Dec 03, 2018 10:00 AM CST   Infusion 60 with UC ONCOLOGY INFUSION, UC 29 ATC   [facility] ([facility])    [address]  [address]  [facility] [id number] [phone]              Future tests that were ordered for you today     Open Future Orders        Priority Expected Expires Ordered    IR Gastro Jejunostomy Tube Placement Routine  11/1/2019 11/1/2018            Who to contact     If you have questions or need follow up information about today's clinic visit or your schedule please contact [facility] directly at [phone].  Normal or non-critical lab and imaging results will be communicated to you by Curiohart, letter or phone within 4 business days after the clinic has received the results. If you do not hear from us within 7 days, please contact the clinic through Curiohart or phone. If you have a critical or abnormal lab result, we will notify you by phone as soon as possible.  Submit refill requests through Tyromer or call your pharmacy and they will forward the refill request to us. Please allow 3 business days for your refill to be completed.          Additional Information About Your Visit        Tyromer Information     Tyromer gives you secure access to your electronic health record. If you see a primary care provider, you can also send messages to your care team and make appointments. If you have questions, please call your primary care clinic.  If you do not have a primary care provider, please call [phone] and they will assist you.        Care EveryWhere ID     This is your Care EveryWhere ID. This could be used by other organizations to access your [place] medical records  [id number]        Your Vitals Were     Pulse Temperature Respirations  Pulse Oximetry BMI (Body Mass Index)       109 98.2  F (36.8  C) (Oral) 16 97% 39.78 kg/m2        Blood Pressure from Last 3 Encounters:   11/01/18 (!) 88/66   11/01/18 118/72   10/23/18 (!) 138/95    Weight from Last 3 Encounters:   11/01/18 (!) 156.1 kg (344 lb 3.2 oz)   10/23/18 (!) 163.2 kg (359 lb 11.2 oz)   10/12/18 (!) 163.8 kg (361 lb 1 oz)              We Performed the Following     Bilirubin direct     CBC with platelets differential     Comprehensive metabolic panel     EKG 12-lead complete w/read - Clinics     Lipase          Today's Medication Changes          These changes are accurate as of 11/1/18  4:48 PM.  If you have any questions, ask your nurse or doctor.               These medicines have changed or have updated prescriptions.        Dose/Directions    gabapentin 300 MG capsule   Commonly known as:  NEURONTIN   This may have changed:    - how much to take  - when to take this  - additional instructions   Used for:  Chemotherapy-induced neuropathy (H)        Two tablets in the am and one tablet in the afternoon and evening   Quantity:  180 capsule   Refills:  1                Primary Care Provider Fax #    Physician No Ref-Primary [phone]       No address on file        Equal Access to Services     OLLIE SHARPE AH: Eliza Dunlap, yeyo davis, qageorgina kaalmajuancarlos sotelo, geraldo cardenas. So [facility] [phone].    ATENCIÓN: Si habla español, tiene a alamo disposición servicios gratuitos de asistencia lingüística. Llame al [phone].    We comply with applicable federal civil rights laws and [place] laws. We do not discriminate on the basis of race, color, national origin, age, disability, sex, sexual orientation, or gender identity.            Thank you!     Thank you for choosing [facility]  for your care. Our goal is always to provide you with excellent care. Hearing back from our patients is one way we can continue to improve  our services. Please take a few minutes to complete the written survey that you may receive in the mail after your visit with us. Thank you!             Your Updated Medication List - Protect others around you: Learn how to safely use, store and throw away your medicines at www.disposemymeds.org.          This list is accurate as of 11/1/18  4:48 PM.  Always use your most recent med list.                   Brand Name Dispense Instructions for use Diagnosis    diphenoxylate-atropine 2.5-0.025 MG per tablet    LOMOTIL    40 tablet    Take 1 tablet by mouth 4 times daily as needed for diarrhea    Diarrhea, unspecified type       * enoxaparin 80 MG/0.8ML injection    LOVENOX    60 Syringe    Inject 1.3 mLs (130 mg) Subcutaneous 2 times daily    Cancer of distal third of esophagus (H)       * enoxaparin 150 MG/ML injection    LOVENOX    60 mL    INJECT 0.86ML (130MG) TWICE A DAY FOR ACUTE PULMONARY EMBOLISM AND ESOPHAGEAL MALIGNANCY    Hx of pulmonary embolus       fish oil-omega-3 fatty acids 1000 MG capsule      Take 2 g by mouth daily        gabapentin 300 MG capsule    NEURONTIN    180 capsule    Two tablets in the am and one tablet in the afternoon and evening    Chemotherapy-induced neuropathy (H)       ketamine 5% gabapentin 8% lidocaine 2.5% topical PLO cream     30 g    Use small amount topically to feet three times daily    Cancer of distal third of esophagus (H), Other secondary hypertension       latanoprost 0.005 % ophthalmic solution    XALATAN    1 Bottle    Place 1 drop into both eyes At Bedtime    Cancer of distal third of esophagus (H)       lidocaine-prilocaine cream    EMLA    30 g    Apply topically as needed for moderate pain    Cancer of distal third of esophagus (H)       lisinopril 40 MG tablet    PRINIVIL/ZESTRIL    30 tablet    Take 0.5 tablets (20 mg) by mouth daily        * LORazepam 0.5 MG tablet    ATIVAN    30 tablet    Take 1 tablet (0.5 mg) by mouth every 4 hours as needed (Anxiety,  Nausea/Vomiting or Sleep)    Cancer of distal third of esophagus (H)       * LORazepam 0.5 MG tablet    ATIVAN    30 tablet    Take 1 tablet (0.5 mg) by mouth every 4 hours as needed (Anxiety, Nausea/Vomiting or Sleep)    Cancer of distal third of esophagus (H)       metoclopramide 10 MG tablet    REGLAN    120 tablet    Take 1 tablet (10 mg) by mouth 3 times daily    Gastroparesis       Multi-vitamin Tabs tablet      Take 1 tablet by mouth daily        omeprazole 40 MG capsule    priLOSEC    90 capsule    Take 1 capsule (40 mg) by mouth daily    Cancer of distal third of esophagus (H)       ondansetron 8 MG tablet    ZOFRAN    30 tablet    Take 1 tablet (8 mg) by mouth every 8 hours as needed (Nausea/Vomiting)    Cancer of distal third of esophagus (H)       prochlorperazine 10 MG tablet    COMPAZINE    30 tablet    Take 1 tablet (10 mg) by mouth every 6 hours as needed (Nausea/Vomiting)    Cancer of distal third of esophagus (H)       tadalafil 5 MG tablet    CIALIS    20 tablet    Take 2 tablets (10 mg) by mouth every 24 hours As needed for sexual activity    Malignant neoplasm of lower third of esophagus (H), Drug-induced erectile dysfunction       timolol 0.5 % ophthalmic solution    TIMOPTIC     Place into both eyes daily    Cancer of distal third of esophagus (H)       zolpidem 10 MG tablet    AMBIEN    60 tablet    Take 1 tablet (10 mg) by mouth nightly as needed    Metastasis from gastric cancer (H)       * Notice:  This list has 4 medication(s) that are the same as other medications prescribed for you. Read the directions carefully, and ask your doctor or other care provider to review them with you.

## 2018-11-01 NOTE — Clinical Note
ASAP PEG placement, I couldn't find any order other than the IR one, page Katalina if needed, let me know when you can

## 2018-11-01 NOTE — PROGRESS NOTES
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 His tumor is positive for cytokeratin 20, negative for P63 and CK7, and HER2 is negative. He started on FOLFOX (5FU/oxaliplatin) on 5/15/2017. He had a delay in treatment from 9/5-10/2/17 due to work related injury.    He had an excellent response by imaging throughout the summer 2017.    He a mixed response by imaging in late fall 2017.    In January 2018, he was switched to taxol and cyramza - had slight progression and neuropathy and clinical trial became available.       In March 2018, he was started on the [facility] Match Clinical Trial with crizotinib.  (MET amplification) He remained on crizotinib from March - July 2018. He was found to have progressive disease. He switched to Taxol/cyramza on 7/24/18. His treatment was changed to days 1,8 every 21 days with cycle 2 due to neutropenia.    Progressive disease on Taxol, Cyramaza. Started palliative Keytruda on 10/23/18.      INTERVAL HISTORY: Levi is here on an add-on basis for multiple concerns, including weight loss, difficulties with intake, lightheadedness.      Since last visit, Levi has had progressive dysphagia to the point he is unable to keep most solids down. He will trial eating (ranging from pizza, broth with soft noodles, mashed potatoes, shakes), and shortly after, he feels a \"knot\" in his esophagus. His mouth then starts watering, and he regurgitates the food or induces vomiting himself for comfort. He has tried just waiting, and eventually the knot will go away in 1-2 hours. He has been trying to keep up with fluids, which do go down OK, he is unsure of how much fluid he gets in. Estimates maybe half gallon but isn't entirely sure about this. Appetite is poor. He has not had fevers, nausea, abdominal pain, or " "bowel changes.    He was feeling lightheaded intermittently over the last two weeks, to the point where he has \"almost gone down\" several times. Yesterday, instructed to stop amlodipine and cut lisinopril to 20 mg. He does not feel lightheaded right now. He does feel weak.    Starting yesterday, he developed pain in his joints involving his hands bilaterally and his knees. Feels achy. He has some old pain medications that he has been trying, which haven't been too helpful.     Over the last 2 weeks, he has had three to four episodes of pain that starts in his right shoulder (where he chronically has pain secondary to rotator cuff issues), but now the pain spreads to his left shoulder area and wraps around his left chest wall. It will last for several hours, and then spontaneously resolves. He has been compliant with Lovenox and has not had any missed doses other than this morning prior to his procedure. He has not felt short of breath. No radiating pains down his arms or up his neck. He feels fine right now.    His 10-point review of systems is otherwise negative.        PHYSICAL EXAMINATION  There were no vitals taken for this visit.    Wt Readings from Last 4 Encounters:   11/01/18 (!) 156.1 kg (344 lb 3.2 oz)   10/23/18 (!) 163.2 kg (359 lb 11.2 oz)   10/12/18 (!) 163.8 kg (361 lb 1 oz)   10/10/18 (!) 163.8 kg (361 lb 1.8 oz)     Constitutional: Alert, oriented male in no visible distress.  Eyes: PERRL. Anicteric sclerae.  ENT/Mouth: OM moist and pink without lesions or thrush.  CV:Tachy, sounds regular for short periods of time, then irregularly irregular for >30 seconds  Resp: CTAB throughout  Abdomen: Soft, non-tender, non-distended. Obese. Bowel sounds present. Unable to palpate liver or spleen.   Extremities: 1+ pitting edema  b/l  Skin: Warm, dry. On first glance does appear slightly jaundiced compare to his baseline complexion  Lymph: No cervical or supraclavicular lymphadenopathy appreciated.   Neuro: CN " II-XII grossly intact.  Absent reflexed at knees bilaterally    ECOG PS: 1    LABS:  Results for REUBEN CHANEY (MRN [id number]) as of 11/1/2018 13:44   Ref. Range 10/23/2018 11:54 10/23/2018 13:03 11/1/2018 11:28 11/1/2018 12:51   Creatinine Latest Ref Range: 0.66 - 1.25 mg/dL 0.89   1.29 (H)   Results for REUBEN CHANEY (MRN [id number]) as of 11/1/2018 13:44   Ref. Range 10/23/2018 11:54 10/23/2018 13:03 11/1/2018 11:28 11/1/2018 12:51   Bilirubin Total Latest Ref Range: 0.2 - 1.3 mg/dL 0.6   1.5 (H)   Results for REUBEN CHANEY (MRN [id number]) as of 11/1/2018 13:44   Ref. Range 11/1/2018 12:51   WBC Latest Ref Range: 4.0 - 11.0 10e9/L 2.7 (L)   Hemoglobin Latest Ref Range: 13.3 - 17.7 g/dL 11.7 (L)   Hematocrit Latest Ref Range: 40.0 - 53.0 % 37.6 (L)   Platelet Count Latest Ref Range: 150 - 450 10e9/L 129 (L)     IMAGING:  XR esophogram 11/1/2018    Impression:   1. Mucosal irregularity of the distal esophagus at the  gastroesophageal junction, corresponding to known esophageal mass seen  previously on CT on 10/10/2018. Contrast passed readily through the  esophagus and into the stomach.  2. Dilatation of the distal esophagus and stasis of contrast, which is  pronounced in supine position.     IMPRESSION/PLAN: Reuben Chaney is a 58-year-old with metastatic esophogeal adenocarcinoma, with progressive disease on multiple lines of therapy, including FOLFOX, Taxol/Cyramza, crizotinib, and again on Taxol/Cyramza. Here today on an add on basis for symptoms.    Dysphagia: secondary to his cancer. Upper endoscopy 10/10/18 showing mild extrinsic compression of the distal esophagus, a small, non-obstructing lesion at the GEJ, and evidence of gastroparesis. He was started on a trial of Reglan.     In the last 2 weeks, he has had progressive dysphagia with limited PO intake, associated with 15 lb weight lost in 1 weeks. XR esophagram was performed today, showing mucosal irregularity corresponding to known  esophageal mass with contrast passing readily through esophagus and into the stomach. There was dilation of the distal esophagus and status of contrast, most pronounced in supine position. Discussed with radiologist, contrast is moving through this area however there is dysmotility in the area of the mass causing dilatation and intermittent statis. No persistent obstruction but likely having some intermittent obstruction. Per discussion with radiology, this likely is causing his symptoms.    Had a long discussion with Levi. He has been opposed to having a PEG placed. Expressed my concern over the rapid pace of weight loss, and that even if he will respond to Keytruda to the point that his dysphagia will improve, this likely will happen in weeks to months. I do not think his intake at this time will support him for this time frame. After our discussion, he was agreeable to having a PEG placed. IR unable to place in timely manner, so discussed with Dr. Fortune of Thoracic, who was willing to perform the procedure tomorrow, recommended inpatient admission to optimize hydration and monitor condition. As long as he remains stable, will perform tomorrow. Plan to keep patient through the weekend to monitor nutrition as he starts PEG feeds.    Remainder of plan is as below:    FEN/Renal: Weight loss as above in context of poor PO intake. Creat bump today. Baseline ~0.7-0.8, increased to 1.29 today, likely secondary to poor PO intake. A liter of NS administered in clinic today. Upon completion of that liter, did run slow second liter.    Back/chest wall pain: Occurring 3-4 times in the last 2 weeks, not presently symptomatic. Lipase was checked given location of his mass abutting his pancreas and was normal. I would suspect with the weight loss and renal dysunction taht his Xa is less likey to be subtherapeutic. Unclear etiology but if recurs would check EKG during active symptoms, could recheck CTPE.    Tachycardia: EKG c/w A  fib. Levi reports long history of intermittent A fib, which could be exacerbated by his recent dehydration.    Achiness in hand joints, knee joints bilaterally: could be secondary to Keytruda. Asked that he monitor.    Metastatic esophogeal adenocarcinoma. Most recent imaging 10/2018 showing slight progression of disease, and symptomatically with increased dysphagia. He has low PDL1 expression. He started palliative Keytruda 10/23/2018.    History of pulmonary embolism.  He is on Lovenox 130 mg twice daily.  He has progressed through Xarelto in the past.  Would recommend checking HepXa when we can (anticipate we will be holding this for his procedure, so will need to be after) given his weight loss, renal dysfunction.     Lightheadedness: History of HTN on Cyramza, requiring Amlodopine and Lisinopril 40 mg daily. Yesterday, instructed to stop Amlodopine and decrease Lisinopril to 20 mg. With recent hypotension and symptomatic lightheadedness, suspect that his antihypertensives are still too high. Recommended stopping Lisinopril. Continue to monitor BP.    Neuropathy: He does have baseline neuropathy.  Gabapentin is maintained at 600/300/600 mg daily in addition to gabapentin gel.      Katalina Ramirez PA-C     I spent >40 minutes with the patient, with over 50% of the time spent counseling or coordinating their care as described above.

## 2018-11-02 ENCOUNTER — ANESTHESIA EVENT (OUTPATIENT)
Dept: SURGERY | Facility: CLINIC | Age: 58
End: 2018-11-02
Payer: COMMERCIAL

## 2018-11-02 VITALS
BODY MASS INDEX: 40.06 KG/M2 | WEIGHT: 315 LBS | RESPIRATION RATE: 18 BRPM | TEMPERATURE: 96.2 F | DIASTOLIC BLOOD PRESSURE: 88 MMHG | SYSTOLIC BLOOD PRESSURE: 129 MMHG | HEART RATE: 103 BPM | OXYGEN SATURATION: 98 %

## 2018-11-02 PROBLEM — T45.1X5A CHEMOTHERAPY-INDUCED NEUTROPENIA (H): Status: ACTIVE | Noted: 2018-11-02

## 2018-11-02 PROBLEM — D70.1 CHEMOTHERAPY-INDUCED NEUTROPENIA (H): Status: ACTIVE | Noted: 2018-11-02

## 2018-11-02 PROBLEM — Z71.89 ACP (ADVANCE CARE PLANNING): Chronic | Status: RESOLVED | Noted: 2017-10-11 | Resolved: 2018-11-02

## 2018-11-02 LAB
ABO + RH BLD: NORMAL
ABO + RH BLD: NORMAL
ALBUMIN SERPL-MCNC: 3 G/DL (ref 3.4–5)
ALP SERPL-CCNC: 68 U/L (ref 40–150)
ALT SERPL W P-5'-P-CCNC: 16 U/L (ref 0–70)
ANION GAP SERPL CALCULATED.3IONS-SCNC: 8 MMOL/L (ref 3–14)
APTT PPP: 36 SEC (ref 22–37)
AST SERPL W P-5'-P-CCNC: 18 U/L (ref 0–45)
BASOPHILS # BLD AUTO: 0 10E9/L (ref 0–0.2)
BASOPHILS NFR BLD AUTO: 0.5 %
BILIRUB SERPL-MCNC: 0.9 MG/DL (ref 0.2–1.3)
BLD GP AB SCN SERPL QL: NORMAL
BLOOD BANK CMNT PATIENT-IMP: NORMAL
BUN SERPL-MCNC: 16 MG/DL (ref 7–30)
CALCIUM SERPL-MCNC: 8.1 MG/DL (ref 8.5–10.1)
CHLORIDE SERPL-SCNC: 112 MMOL/L (ref 94–109)
CO2 SERPL-SCNC: 23 MMOL/L (ref 20–32)
CREAT SERPL-MCNC: 0.89 MG/DL (ref 0.66–1.25)
DIFFERENTIAL METHOD BLD: ABNORMAL
EOSINOPHIL # BLD AUTO: 0 10E9/L (ref 0–0.7)
EOSINOPHIL NFR BLD AUTO: 1.6 %
ERYTHROCYTE [DISTWIDTH] IN BLOOD BY AUTOMATED COUNT: 17.1 % (ref 10–15)
FIBRINOGEN PPP-MCNC: 288 MG/DL (ref 200–420)
GFR SERPL CREATININE-BSD FRML MDRD: 88 ML/MIN/1.7M2
GLUCOSE SERPL-MCNC: 102 MG/DL (ref 70–99)
HCT VFR BLD AUTO: 34.5 % (ref 40–53)
HGB BLD-MCNC: 10.8 G/DL (ref 13.3–17.7)
IMM GRANULOCYTES # BLD: 0 10E9/L (ref 0–0.4)
IMM GRANULOCYTES NFR BLD: 0 %
INR PPP: 1.12 (ref 0.86–1.14)
INTERPRETATION ECG - MUSE: NORMAL
LDH SERPL L TO P-CCNC: 208 U/L (ref 85–227)
LYMPHOCYTES # BLD AUTO: 0.7 10E9/L (ref 0.8–5.3)
LYMPHOCYTES NFR BLD AUTO: 37.3 %
MAGNESIUM SERPL-MCNC: 1.6 MG/DL (ref 1.6–2.3)
MCH RBC QN AUTO: 28.3 PG (ref 26.5–33)
MCHC RBC AUTO-ENTMCNC: 31.3 G/DL (ref 31.5–36.5)
MCV RBC AUTO: 90 FL (ref 78–100)
MONOCYTES # BLD AUTO: 0.2 10E9/L (ref 0–1.3)
MONOCYTES NFR BLD AUTO: 10.4 %
NEUTROPHILS # BLD AUTO: 1 10E9/L (ref 1.6–8.3)
NEUTROPHILS NFR BLD AUTO: 50.2 %
PHOSPHATE SERPL-MCNC: 2.5 MG/DL (ref 2.5–4.5)
PLATELET # BLD AUTO: 92 10E9/L (ref 150–450)
POTASSIUM SERPL-SCNC: 3.9 MMOL/L (ref 3.4–5.3)
PROT SERPL-MCNC: 6.3 G/DL (ref 6.8–8.8)
RBC # BLD AUTO: 3.82 10E12/L (ref 4.4–5.9)
RETICS # AUTO: 116.1 10E9/L (ref 25–95)
RETICS/RBC NFR AUTO: 3 % (ref 0.5–2)
SODIUM SERPL-SCNC: 142 MMOL/L (ref 133–144)
SPECIMEN EXP DATE BLD: NORMAL
TROPONIN I SERPL-MCNC: <0.015 UG/L (ref 0–0.04)
TROPONIN I SERPL-MCNC: <0.015 UG/L (ref 0–0.04)
WBC # BLD AUTO: 1.9 10E9/L (ref 4–11)

## 2018-11-02 PROCEDURE — 86901 BLOOD TYPING SEROLOGIC RH(D): CPT | Performed by: THORACIC SURGERY (CARDIOTHORACIC VASCULAR SURGERY)

## 2018-11-02 PROCEDURE — 84484 ASSAY OF TROPONIN QUANT: CPT | Performed by: INTERNAL MEDICINE

## 2018-11-02 PROCEDURE — 25000132 ZZH RX MED GY IP 250 OP 250 PS 637: Performed by: PHYSICIAN ASSISTANT

## 2018-11-02 PROCEDURE — 36592 COLLECT BLOOD FROM PICC: CPT | Performed by: PHYSICIAN ASSISTANT

## 2018-11-02 PROCEDURE — 99239 HOSP IP/OBS DSCHRG MGMT >30: CPT | Performed by: INTERNAL MEDICINE

## 2018-11-02 PROCEDURE — 36592 COLLECT BLOOD FROM PICC: CPT | Performed by: INTERNAL MEDICINE

## 2018-11-02 PROCEDURE — 25000128 H RX IP 250 OP 636: Performed by: INTERNAL MEDICINE

## 2018-11-02 PROCEDURE — C9113 INJ PANTOPRAZOLE SODIUM, VIA: HCPCS | Performed by: INTERNAL MEDICINE

## 2018-11-02 PROCEDURE — 85045 AUTOMATED RETICULOCYTE COUNT: CPT | Performed by: PHYSICIAN ASSISTANT

## 2018-11-02 PROCEDURE — 85730 THROMBOPLASTIN TIME PARTIAL: CPT | Performed by: PHYSICIAN ASSISTANT

## 2018-11-02 PROCEDURE — 27210429 ZZH NUTRITION PRODUCT INTERMEDIATE LITER

## 2018-11-02 PROCEDURE — 85027 COMPLETE CBC AUTOMATED: CPT | Performed by: PHYSICIAN ASSISTANT

## 2018-11-02 PROCEDURE — 83615 LACTATE (LD) (LDH) ENZYME: CPT | Performed by: PHYSICIAN ASSISTANT

## 2018-11-02 PROCEDURE — 83615 LACTATE (LD) (LDH) ENZYME: CPT | Performed by: INTERNAL MEDICINE

## 2018-11-02 PROCEDURE — 86900 BLOOD TYPING SEROLOGIC ABO: CPT | Performed by: THORACIC SURGERY (CARDIOTHORACIC VASCULAR SURGERY)

## 2018-11-02 PROCEDURE — 85004 AUTOMATED DIFF WBC COUNT: CPT | Performed by: PHYSICIAN ASSISTANT

## 2018-11-02 PROCEDURE — 25000132 ZZH RX MED GY IP 250 OP 250 PS 637: Performed by: INTERNAL MEDICINE

## 2018-11-02 PROCEDURE — 80053 COMPREHEN METABOLIC PANEL: CPT | Performed by: PHYSICIAN ASSISTANT

## 2018-11-02 PROCEDURE — 85610 PROTHROMBIN TIME: CPT | Performed by: PHYSICIAN ASSISTANT

## 2018-11-02 PROCEDURE — 85384 FIBRINOGEN ACTIVITY: CPT | Performed by: PHYSICIAN ASSISTANT

## 2018-11-02 PROCEDURE — 40000611 ZZHCL STATISTIC MORPHOLOGY W/INTERP HEMEPATH TC 85060: Performed by: PHYSICIAN ASSISTANT

## 2018-11-02 PROCEDURE — 84100 ASSAY OF PHOSPHORUS: CPT | Performed by: PHYSICIAN ASSISTANT

## 2018-11-02 PROCEDURE — 83735 ASSAY OF MAGNESIUM: CPT | Performed by: PHYSICIAN ASSISTANT

## 2018-11-02 PROCEDURE — 25000128 H RX IP 250 OP 636: Performed by: PHYSICIAN ASSISTANT

## 2018-11-02 PROCEDURE — 86850 RBC ANTIBODY SCREEN: CPT | Performed by: THORACIC SURGERY (CARDIOTHORACIC VASCULAR SURGERY)

## 2018-11-02 PROCEDURE — 86901 BLOOD TYPING SEROLOGIC RH(D): CPT | Performed by: STUDENT IN AN ORGANIZED HEALTH CARE EDUCATION/TRAINING PROGRAM

## 2018-11-02 RX ORDER — CLINDAMYCIN PHOSPHATE 900 MG/50ML
900 INJECTION, SOLUTION INTRAVENOUS SEE ADMIN INSTRUCTIONS
Status: DISCONTINUED | OUTPATIENT
Start: 2018-11-02 | End: 2018-11-02 | Stop reason: HOSPADM

## 2018-11-02 RX ORDER — CLINDAMYCIN PHOSPHATE 900 MG/50ML
900 INJECTION, SOLUTION INTRAVENOUS
Status: DISCONTINUED | OUTPATIENT
Start: 2018-11-02 | End: 2018-11-02 | Stop reason: HOSPADM

## 2018-11-02 RX ORDER — HEPARIN SODIUM,PORCINE 10 UNIT/ML
5-10 VIAL (ML) INTRAVENOUS
Status: DISCONTINUED | OUTPATIENT
Start: 2018-11-02 | End: 2018-11-02 | Stop reason: HOSPADM

## 2018-11-02 RX ORDER — ACETAMINOPHEN 325 MG/1
975 TABLET ORAL ONCE
Status: DISCONTINUED | OUTPATIENT
Start: 2018-11-02 | End: 2018-11-02 | Stop reason: HOSPADM

## 2018-11-02 RX ORDER — LIDOCAINE 40 MG/G
CREAM TOPICAL
Status: DISCONTINUED | OUTPATIENT
Start: 2018-11-02 | End: 2018-11-02 | Stop reason: HOSPADM

## 2018-11-02 RX ORDER — SALIVA STIMULANT COMB. NO.3
1-2 SPRAY, NON-AEROSOL (ML) MUCOUS MEMBRANE
Status: DISCONTINUED | OUTPATIENT
Start: 2018-11-02 | End: 2018-11-02 | Stop reason: HOSPADM

## 2018-11-02 RX ORDER — AMINO AC/PROTEIN HYDR/WHEY PRO 10G-100/30
2 LIQUID (ML) ORAL 3 TIMES DAILY
Status: DISCONTINUED | OUTPATIENT
Start: 2018-11-03 | End: 2018-11-02 | Stop reason: HOSPADM

## 2018-11-02 RX ORDER — HEPARIN SODIUM,PORCINE 10 UNIT/ML
5-10 VIAL (ML) INTRAVENOUS EVERY 24 HOURS
Status: DISCONTINUED | OUTPATIENT
Start: 2018-11-02 | End: 2018-11-02 | Stop reason: HOSPADM

## 2018-11-02 RX ORDER — HEPARIN SODIUM (PORCINE) LOCK FLUSH IV SOLN 100 UNIT/ML 100 UNIT/ML
5 SOLUTION INTRAVENOUS
Status: DISCONTINUED | OUTPATIENT
Start: 2018-11-02 | End: 2018-11-02 | Stop reason: HOSPADM

## 2018-11-02 RX ADMIN — Medication 2 SPRAY: at 11:48

## 2018-11-02 RX ADMIN — GABAPENTIN 600 MG: 300 CAPSULE ORAL at 08:28

## 2018-11-02 RX ADMIN — Medication 5 ML: at 11:10

## 2018-11-02 RX ADMIN — SODIUM CHLORIDE: 9 INJECTION, SOLUTION INTRAVENOUS at 14:21

## 2018-11-02 RX ADMIN — HEPARIN 5 ML: 100 SYRINGE at 17:29

## 2018-11-02 RX ADMIN — GABAPENTIN 300 MG: 300 CAPSULE ORAL at 11:56

## 2018-11-02 RX ADMIN — PANTOPRAZOLE SODIUM 40 MG: 40 INJECTION, POWDER, FOR SOLUTION INTRAVENOUS at 08:28

## 2018-11-02 RX ADMIN — FILGRASTIM 480 MCG: 480 INJECTION, SOLUTION INTRAVENOUS; SUBCUTANEOUS at 17:29

## 2018-11-02 RX ADMIN — Medication: at 15:18

## 2018-11-02 RX ADMIN — SODIUM CHLORIDE: 9 INJECTION, SOLUTION INTRAVENOUS at 05:19

## 2018-11-02 RX ADMIN — Medication: at 08:28

## 2018-11-02 ASSESSMENT — ACTIVITIES OF DAILY LIVING (ADL)
ADLS_ACUITY_SCORE: 9

## 2018-11-02 NOTE — PROGRESS NOTES
[facility], [place]  Hematology / Oncology Progress Note     Assessment & Plan   Reuben Padilla is a 58 year old male with metastatic esophageal adenocarcinoma, with progressive disease on multiple lines of therapy, including FOLFOX, Taxol/Cyramza, crizotinib, and again on Taxol/Cyramza, who was recently started on palliative Keytruda on 10/23, who presents from clinic with symptoms of esophageal obstruction.     #Progressive dysphagia secondary to intermittent/partial esophageal obstruction  #Weight loss, poor appetite  Presented to clinic 11/1 with c/o progressive dysphagia with solids over the last few weeks, characterized by knot in stomach and then regurgitation/self-induced vomiting. 15 lb weight loss in 1 week. XR esophagram performed today, showing mucosal irregularity corresponding to known esophageal mass with contrast passing readily through esophagus into stomach. There was dilation of the distal esophagus and status of contrast, most pronounced in supine position. Per outpatient discussion with radiology, contrast is moving through this area however there is dysmotility in the area of the mass causing dilatation and intermittent stasis. No persistent obstruction but likely having some intermittent obstruction, which is likely causing his symptoms.   Last upper endoscopy done 10/10/18 showing mild extrinsic compression of the distal esophagus, a small non-obstructing lesion at the GEJ, and evidence of gastroparesis, had been started on a trial of reglan.   Has had discussion re: PEG placement in the past but has declined. Now agreeable to have PEG placed, on add-on schedule for this afternoon with Thoracic Surgery team.  -NPO until feeding tube placed, orders placed for RD to start tube feeds when tube ready for use. At risk for refeeding, so likely will need monitoring over the weekend to follow electrolytes.     #Lightheadedness over the last 2 weeks, with presyncopal  symptoms  Stopped amlodipine and cut lisinopril to 20mg daily as instructed 10/31 (had previously required these due to Cyramza-induced hypertension)  - Monitor blood pressures and orthostatics  - Hold lisinopril and amlodipine for now  - Hydralazine prn    #Pancytopenia  -Likely secondary to disease, last cytotoxic chemotherapy given on 10/2 (Taxol)  -Peripheral smear ordered     #History of paroxysmal Afib  Admission EKG with a-fib and tachycardia 110's likely exacerbated by dehydration, heart rate improved with hydration and regular rhythm auscultated on today's exam.     #ABDULLAHI  Baseline Cr is 0.7-0.8, increased to 1.29 on admission, likely from dehydration.   -Improved to 0.89 s/p IV hydration     #Right shoulder pain spreading to left shoulder area and wrapping around to left chest wall  - Lipase normal, EKG with a-fib, troponins negative  - Possibly secondary to tumor progression vs. Subtherapeutic anticoagulation     #Joint pain - hands and knees, possibly from Keytruda     #Metastatic esophageal adenocarcinoma, liver metastases, widespread lavon metastasis  Tumor is positive for cytokeratin 20, negative for P63 and CK7, and HER2 negative. Started on FOLFOX on 5/15/17. Delay in treatment from 9/5-10/2/17 due to work related injury. He had an excellent response by imaging throughout the summer 2017, but a mixed response by imaging in late fall 2017. In January 2018, he was switched to Taxol/Cyramza with slight progression and neuropathy and clinical trial became available. In March 2018, he was started on the NCI Match Clinical Trial with crizotinib (MET amplification). He remained on crizotinib March - July 2018, but was found to have progressive disease. He switched to Taxol/cyramza on 7/24/18 and needed treatment modification to days 1,8 every 21 days with cycle 2 due to neutropenia. With progressive disease on taxol/cyramza, he started palliative Keytruda on 10/23/2018. He has low PDL1  expression.     #History of PE - on lovenox 130mg BID, currently on HOLD for procedure.     #Neuropathy - at baseline. Gabapentin 600/300/600 in addition to gabapentin gel.    #FEN  -MIVF at 125ml/hr  -Lyte replacement PRN per protocol  -NPO until procedure, then will start TF    #Prophy  -No pharmacologic VTE prophy given planned procedure.    Disposition Plan   Expected discharge: 2 - 3 days, recommended to prior living arrangement once PEG tube placed and tolerating tube feeds..     Entered: Radha Camejo 11/02/2018, 1:16 PM      Patient and plan of care discussed with staff attending, Dr. Bryant.     JAI ContehC  Hematology/Oncology  [phone]    Interval History   Levi is doing ok today. He is frustrated that he doesn't have an official time for his feeding tube placement today. He has a lot of dry mouth from not being able to eat or drink for the procedure. No fevers. Reports he has not been able to eat a full meal for about 2 weeks.    Physical Exam   Temp: 95.9  F (35.5  C) Temp src: Oral BP: 132/76 Pulse: 97   Resp: 18 SpO2: 97 % O2 Device: None (Room air)    Vitals:    11/01/18 1723 11/02/18 0812   Weight: (!) 158.4 kg (349 lb 2.2 oz) (!) 157.2 kg (346 lb 9.6 oz)     Vital Signs with Ranges  Temp:  [95.9  F (35.5  C)-98  F (36.7  C)] 95.9  F (35.5  C)  Pulse:  [] 97  Resp:  [18-20] 18  BP: (118-137)/(72-82) 132/76  SpO2:  [96 %-99 %] 97 %  I/O last 3 completed shifts:  In: 3093.92 [P.O.:476; I.V.:2617.92]  Out: 1450 [Urine:1450]    Constitutional: Awake, alert, cooperative, no apparent distress, and appears stated age.  Eyes: Lids and lashes normal, pupils equal, round and reactive to light, extra ocular muscles intact, sclera clear, conjunctiva normal.  ENT: Normocephalic, without obvious abnormality, atraumatic.  Respiratory: No increased work of breathing, good air exchange, clear to auscultation bilaterally, no crackles or wheezing.  Cardiovascular: Tachycardic, regular rhythm, no  murmur.  GI: Normal bowel sounds, obese, soft, non-distended, non-tender.  Neurologic: No focal deficits.  Neuropsychiatric: Calm, normal eye contact, alert, normal affect, oriented to self, place, time and situation, memory for past and recent events intact and thought process normal.    Medications     IV fluid REPLACEMENT ONLY       no pre procedure antibiotic needed       no pre procedure antibiotic needed       sodium chloride 125 mL/hr at 11/02/18 1200       acetaminophen  975 mg Oral Once     clindamycin  900 mg Intravenous Pre-Op/Pre-procedure x 1 dose     clindamycin  900 mg Intravenous See Admin Instructions     enoxaparin  40 mg Subcutaneous Pre-Op/Pre-procedure x 1 dose     gabapentin  300 mg Oral Daily with lunch     gabapentin  600 mg Oral BID     heparin  5 mL Intracatheter Q28 Days     heparin lock flush  5-10 mL Intracatheter Q24H     ketamine 5% gabapentin 8% lidocaine 2.5%   Topical TID     latanoprost  1 drop Both Eyes At Bedtime     [START ON 11/3/2018] multivitamins with minerals  15 mL Per Feeding Tube Daily     pantoprazole (PROTONIX) IV  40 mg Intravenous BID     [START ON 11/3/2018] protein modular  2 packet Per Feeding Tube TID     timolol  1 drop Both Eyes Daily       Data   Results for orders placed or performed during the hospital encounter of 11/01/18 (from the past 24 hour(s))   Troponin I   Result Value Ref Range    Troponin I ES <0.015 0.000 - 0.045 ug/L   Troponin I   Result Value Ref Range    Troponin I ES <0.015 0.000 - 0.045 ug/L   ABO/Rh type and screen   Result Value Ref Range    ABO A     RH(D) Pos     Antibody Screen Neg     Test Valid Only At          [facility],[employer]    Specimen Expires 11/05/2018    CBC with platelets   Result Value Ref Range    WBC 1.9 (L) 4.0 - 11.0 10e9/L    RBC Count 3.82 (L) 4.4 - 5.9 10e12/L    Hemoglobin 10.8 (L) 13.3 - 17.7 g/dL    Hematocrit 34.5 (L) 40.0 - 53.0 %    MCV 90 78 - 100 fl    MCH 28.3 26.5 -  33.0 pg    MCHC 31.3 (L) 31.5 - 36.5 g/dL    RDW 17.1 (H) 10.0 - 15.0 %    Platelet Count 92 (L) 150 - 450 10e9/L   Comprehensive metabolic panel   Result Value Ref Range    Sodium 142 133 - 144 mmol/L    Potassium 3.9 3.4 - 5.3 mmol/L    Chloride 112 (H) 94 - 109 mmol/L    Carbon Dioxide 23 20 - 32 mmol/L    Anion Gap 8 3 - 14 mmol/L    Glucose 102 (H) 70 - 99 mg/dL    Urea Nitrogen 16 7 - 30 mg/dL    Creatinine 0.89 0.66 - 1.25 mg/dL    GFR Estimate 88 >60 mL/min/1.7m2    GFR Estimate If Black >90 >60 mL/min/1.7m2    Calcium 8.1 (L) 8.5 - 10.1 mg/dL    Bilirubin Total 0.9 0.2 - 1.3 mg/dL    Albumin 3.0 (L) 3.4 - 5.0 g/dL    Protein Total 6.3 (L) 6.8 - 8.8 g/dL    Alkaline Phosphatase 68 40 - 150 U/L    ALT 16 0 - 70 U/L    AST 18 0 - 45 U/L   INR   Result Value Ref Range    INR 1.12 0.86 - 1.14   Magnesium   Result Value Ref Range    Magnesium 1.6 1.6 - 2.3 mg/dL   Partial thromboplastin time   Result Value Ref Range    PTT 36 22 - 37 sec   Phosphorus   Result Value Ref Range    Phosphorus 2.5 2.5 - 4.5 mg/dL   Fibrinogen activity   Result Value Ref Range    Fibrinogen 288 200 - 420 mg/dL   Lactate Dehydrogenase   Result Value Ref Range    Lactate Dehydrogenase 208 85 - 227 U/L   Reticulocyte count   Result Value Ref Range    % Retic 3.0 (H) 0.5 - 2.0 %    Absolute Retic 116.1 (H) 25 - 95 10e9/L   WBC Differential   Result Value Ref Range    Diff Method Automated Method     % Neutrophils 50.2 %    % Lymphocytes 37.3 %    % Monocytes 10.4 %    % Eosinophils 1.6 %    % Basophils 0.5 %    % Immature Granulocytes 0.0 %    Absolute Neutrophil 1.0 (L) 1.6 - 8.3 10e9/L    Absolute Lymphocytes 0.7 (L) 0.8 - 5.3 10e9/L    Absolute Monocytes 0.2 0.0 - 1.3 10e9/L    Absolute Eosinophils 0.0 0.0 - 0.7 10e9/L    Absolute Basophils 0.0 0.0 - 0.2 10e9/L    Abs Immature Granulocytes 0.0 0 - 0.4 10e9/L

## 2018-11-02 NOTE — PLAN OF CARE
Problem: Nutrition, Imbalanced: Inadequate Oral Intake (Adult)  Goal: Identify Related Risk Factors and Signs and Symptoms  Related risk factors and signs and symptoms are identified upon initiation of Human Response Clinical Practice Guideline (CPG).   Outcome: No Change    Afebrile.  Tachycardic HR (100's).  OVSS on room air.  Denies pain and nausea/vomiting.  C/o dry mouth, given PRN biotene spray.  OR procedure cancelled today d/t low counts and OR schedule restraints.  Patient given one dose of subcutaneous Neupogen prior to discharge, will receive two additional doses in outpatient clinic on Saturday (11/3) and Sunday (11/4) in preparation for scheduled outpatient OR procedure on Monday (11/5). Port de-accessed and heparin locked, blood return noted.  Up ad elham.        Discharge  D: Orders for discharge and outpatient medications written.  I: Home medications and return to clinic schedule reviewed with patient. Discharge instructions and parameters for calling Health Care Provider reviewed. Patient left at 1748 accompanied by his brother.   A: Patient/family verbalized understanding and was ready for discharge.   P: Patient instructed to  medications in Pharmacy. Follow up as scheduled in the [facility] ([facility]) on Saturday 11/3 at 12:30PM.

## 2018-11-02 NOTE — OR NURSING
Reuben is currently an inpatient at [facility]. I talked with his nurse and told her he is scheduled to arrive at [facility] on 11/5/18 at 1115 for his surgery at 1315. She said he does not have any feeding tube yet. His NPO will be 0500 am for solids and 1115 am for clear liquids. Medications to take or hold before procedure should be determined by Dr Fortune before pt's discharge.

## 2018-11-02 NOTE — PLAN OF CARE
Problem: Patient Care Overview  Goal: Plan of Care/Patient Progress Review  Outcome: No Change  /80 (BP Location: Right arm)  Pulse 91  Temp 96.3  F (35.7  C) (Oral)  Resp 20  Wt (!) 158.4 kg (349 lb 2.2 oz)  SpO2 98%  BMI 40.36 kg/m2    A/Ox4. VSS on RA. Denies pain, nausea, or SOB. NPO since midnight. On add on schedule for OR today. MIVF infusing at 125 mL/hr. Pt up ad elham. Will continue to monitor and follow POC.

## 2018-11-02 NOTE — PROGRESS NOTES
Writer spoke with OR control desk, patient is an add-on to OR schedule for later this afternoon and procedure will be done today.  Sign and held Pre-op orders released.

## 2018-11-02 NOTE — PROGRESS NOTES
CLINICAL NUTRITION SERVICES - ASSESSMENT NOTE     Nutrition Prescription    RECOMMENDATIONS FOR MDs/PROVIDERS TO ORDER:  None at this time.    Malnutrition Status:    Severe malnutrition in the context of acute illness.    Recommendations already ordered by Registered Dietitian (RD):  1. Ordered TF with start at 10 mL/hr and adv by 10 mL q8h pending Phos, Mg, and K and tolerance (patient at high risk of refeeding syndrome):   --Isosource 1.5 @ goal 60 ml/hr (1440 ml/day) to provide 2160 kcals (19 kcal/kg/day), 98 g PRO (0.9 g/kg/day), 1094 ml free H2O, 253 g CHO and 22 g Fiber daily.   --2 ProSource Protein Pkt TID (total of 6 packets daily)   --160 mL water flushes every 4 hours if patient unable to meet hydration needs via PO  -->With ProSource Protein pkts and TF, provides a total of 2400 kcal (21 kcal/kg) and 164 g protein (1.5 g PRO/kg) daily.    2. Certavite to supplement TF    Future/Additional Recommendations:  -Monitor tolerance to TF adv (lytes, N/V)    -Pending LOS, consider bolus feedings:  Stop continuous TF for 1-2 hrs to let stomach empty prior to starting gravity feeding. Begin 1st gravity feeding @ 125 mL x 2 feedings (separate 3-4 hrs apart). If tolerated, increase vol to 250 mL x 2 feedings and then increase to goal vol of 500 mL x 3 feedings per day.  Flush with 160 mL of H20 before and after each feeding if TF to provide full hydration +2 ProSource Protein Pkt TID (total of 6 packets daily). Do not give feedings at night while pt asleep (during the day only).    -Pending LOS, monitor PO and need to decrease provisions via PEG     REASON FOR ASSESSMENT  Reuben Padilla is a/an 58 year old male assessed by the dietitian for Admission Nutrition Risk Screen for unintentional loss of 10# or more in the past two months and Provider Order - Registered Dietitian to Assess and Order TF per Medical Nutrition Therapy Protocol    NUTRITION HISTORY  PMH: metastatic esophageal adenocarcinoma, with  "progressive disease on multiple lines of therapy, including FOLFOX, Taxol/Cyramza, crizotinib, and again on Taxol/Cyramza, who was recently started on palliative Keytruda on 10/23, who presents from clinic with symptoms of esophageal obstruction    PO PTA: Met with patient and friend/family. Pt notes that he has not had a normal meal since 3-4 weeks ago. He has been unable to swallow many solid foods and over the past 10 days, and intake has been minimal, with maybe some some protein shakes (made from a powder, did not know name) and broths as main sources of nutrition. He reports that with solids, he vomits/regurgitates it, but that liquids are tolerated. Otherwise, prior to around a month ago, had been eating normally and denies any challenges with this (named pizza and burgers as examples of meals he enjoyed prior to difficulty eating).    CURRENT NUTRITION ORDERS  Diet: NPO  Intake/Tolerance: No intake since admission, may be allowed full liquids per H&P note. Pt hopeful a stent can be placed to allow for him to eat food.     LABS  Labs reviewed  -Cr: 0.89 (WNL, had been elevated, but downtrended); BUN: 16 (WNL); Mg 1.6 (on low end of WNL); Phos: 2.5 (on low end of WNL); K: 3.9 (WNL)    MEDICATIONS  Medications reviewed  -Pantoprazole    ANTHROPOMETRICS  Height: 0 cm (Data Unavailable)   Ht Readings from Last 1 Encounters:   10/23/18 1.981 m (6' 5.99\")   Most Recent Weight: (!) 157.2 kg (346 lb 9.6 oz)    IBW: 97.3 kg  BMI: 40.06 kg/m2; Obesity Grade III BMI >40  Weight History:    Wt Readings from Last 10 Encounters:   11/02/18 (!) 157.2 kg (346 lb 9.6 oz)   11/01/18 (!) 156.1 kg (344 lb 3.2 oz)   10/23/18 (!) 163.2 kg (359 lb 11.2 oz)   10/12/18 (!) 163.8 kg (361 lb 1 oz)   10/10/18 (!) 163.8 kg (361 lb 1.8 oz)   10/02/18 (!) 162 kg (357 lb 4 oz)   09/04/18 (!) 172.3 kg (379 lb 12.8 oz)   08/28/18 (!) 168.7 kg (372 lb)   08/17/18 (!) 168 kg (370 lb 6 oz)   08/07/18 (!) 168.6 kg (371 lb 9.6 oz)     Dosing " Weight: 112 kg (based on lowest/driest recent weight taken 11/1/18 of 156.1 kg and IBW of 97.3 kg)    ASSESSED NUTRITION NEEDS  Estimated Energy Needs: 8878-6845 kcals/day (20 - 25 kcals/kg)  Justification: Obese  Estimated Protein Needs: 168-224 grams protein/day (1.5 - 2 grams of pro/kg)  Justification: Obese (aim for lower end of needs while with ABDULLAHI)  Estimated Fluid Needs: 6295-4874  mL/day (1 mL/kcal)   Justification: Maintenance    PHYSICAL FINDINGS  See malnutrition section below.    MALNUTRITION  % Intake: </=50% for >/= 1 month (severe); patient shares he has had significant decrease in intake since around 3-4 weeks ago.    % Weight Loss: Up to 7.5% in 3 months (non-severe); 7.1% weight loss over past 3 months, using higher weight of 168 kg on 8/17/18 and lowest recent weight of 156.1 kg on 11/1/18; or 4.7% in past month, based on high weight of 163.8 kg on 10/10/18 and lowest recent weight of 156.1 kg on 11/1/18 which is non-severe as well.    Subcutaneous Fat Loss:   -Facial region: Orbital region full; face full  -Upper arm:  Ample subcutaneous adipose  -Lower arm:  Some depth of pinch of subcutaneous adipose  -Thoracic/intercostal: Full ribs/intercostal spaces    Muscle Loss:   -Temporal:  Slight depression of temporomandibular muscle (mild to moderate)  -Facial & jaw region: Full  -Thoracic region (clavicle, acromium bone, deltoid, trapezius, pectoral): Clavicle not protruding and with good muscle overlay; acromium process rounded; pectorals well developed  -Upper arm (bicep, tricep):  No loss noted; well-developed  -Dorsal hand:  Flat to bulging interosseous muscle  -Upper leg (quadricep, hamstring):  No loss noted; well-developed  -Patellar region:  Rounded and muscled  -Posterior calf:  Fluid accumulation here makes it difficult to assess    Fluid Accumulation/Edema: Moderate - 1+ bipedal pitting edema (pt wears compression socks occasionally at home)    Malnutrition Diagnosis: Severe malnutrition  in the context of acute illness.    NUTRITION DIAGNOSIS  Swallowing difficulty related to esophageal mass as evidenced by progressive dysphagia of solid foods and need for PEG tube for majority of nutrition needs.       INTERVENTIONS  Implementation  -Nutrition Education: Provided education on RD role and brief introduction to enteral nutrition.   -Collaboration with other nutrition professionals  -Enteral Nutrition - Initiate  -Feeding tube flush  -Multivitamin/mineral supplement therapy     Goals  Total avg nutritional intake to meet a minimum of 20 kcal/kg and 1.5 g PRO/kg daily (per dosing wt 112 kg).     Monitoring/Evaluation  Progress toward goals will be monitored and evaluated per protocol.    Mimi Godinez RD  Pager: [phone]

## 2018-11-02 NOTE — DISCHARGE SUMMARY
[facility], [place]  Discharge Summary  Hematology / Oncology    Date of Admission:  11/1/2018  Date of Discharge:  11/2/2018  5:48 PM  Discharging Provider: Radha Camejo PA-C/Carlene Bryant MD    Discharge Diagnoses      Esophageal obstruction  Cancer of distal third of esophagus (H)  Chemotherapy-induced neutropenia (H)  Hx of pulmonary embolus    History of Present Illness   Reuben Padilla is a 58 year old male with metastatic esophageal adenocarcinoma, with progressive disease on multiple lines of therapy, including FOLFOX, Taxol/Cyramza, crizotinib, and again on Taxol/Cyramza, who was recently started on palliative Keytruda on 10/23, who was admitted from clinic with symptoms of esophageal obstruction. Had outpatient esophagram which indicated dysmotility and likely intermittent obstruction. He was admitted for PEG tube placement. Unfortunately he was found to be neutropenic with an ANC of 1000 and the surgery schedule did not allow for him to be able to have tube placed, especially given this non-emergent indication. Levi elected to be discharged home, with plan for PEG tube placement on Monday 11/5. To help his counts, we started Neupogen 480mcg daily, and he was given a dose on 11/2 prior to discharge. He will get 2 more doses of Neupogen over the weekend, and will have labs drawn first thing Monday morning to see if counts are eligible for tube placement Monday. He was instructed to HOLD therapeutic Lovenox on Sunday evening and Monday morning in case surgery could proceed. Should be NPO in case of procedure. I did send a message to outpatient team (Dr. Dee and Katalina Ramirez PA-C) to ask if they could check patient's lab results on Monday morning to see if they were adequate for the procedure, appreciate their assistance with this. He will stick with full liquid diet over the weekend, encouraged high protein/high calorie drinks.    Hospital Course   Reuben Padilla  was admitted on 11/1/2018.  The following problems were addressed during his hospitalization:      #Progressive dysphagia secondary to intermittent/partial esophageal obstruction  #Weight loss, poor appetite  Presented to clinic 11/1 with c/o progressive dysphagia with solids over the last few weeks, characterized by knot in stomach and then regurgitation/self-induced vomiting. 15 lb weight loss in 1 week. XR esophagram performed today, showing mucosal irregularity corresponding to known esophageal mass with contrast passing readily through esophagus into stomach. There was dilation of the distal esophagus and status of contrast, most pronounced in supine position. Per outpatient discussion with radiology, contrast is moving through this area however there is dysmotility in the area of the mass causing dilatation and intermittent stasis. No persistent obstruction but likely having some intermittent obstruction, which is likely causing his symptoms.   Last upper endoscopy done 10/10/18 showing mild extrinsic compression of the distal esophagus, a small non-obstructing lesion at the GEJ, and evidence of gastroparesis, had been started on a trial of reglan.   Has had discussion re: PEG placement in the past but has declined. Now agreeable to have PEG placed, due to neutropenia and scheduling this was unable to be done as inpatient. He will start Neupogen and follow up on Monday with lab draw for tube placement if counts adequate.      #Lightheadedness over the last 2 weeks, with presyncopal symptoms  Stopped amlodipine and cut lisinopril to 20mg daily as instructed 10/31 (had previously required these due to Cyramza-induced hypertension)  - BPs and orthostatics WNL. Symptoms improved/resolved. OK to resume home meds at discharge.     #Pancytopenia  -Likely secondary to disease, last cytotoxic chemotherapy given on 10/2 (Taxol)  -Peripheral smear ordered. Started Neupogen daily x 3 days on 11/2.    #History of  paroxysmal Afib  Admission EKG with a-fib and tachycardia 110's likely exacerbated by dehydration, heart rate improved with hydration and regular rhythm auscultated on today's exam.      #ABDULLAHI  Baseline Cr is 0.7-0.8, increased to 1.29 on admission, likely from dehydration.   -Improved to 0.89 s/p IV hydration      #Right shoulder pain spreading to left shoulder area and wrapping around to left chest wall  - Lipase normal, EKG with a-fib, troponins negative  - Possibly secondary to tumor progression vs. Subtherapeutic anticoagulation      #Joint pain - hands and knees, possibly from Keytruda      #Metastatic esophageal adenocarcinoma, liver metastases, widespread lavon metastasis  Tumor is positive for cytokeratin 20, negative for P63 and CK7, and HER2 negative. Started on FOLFOX on 5/15/17. Delay in treatment from 9/5-10/2/17 due to work related injury. He had an excellent response by imaging throughout the summer 2017, but a mixed response by imaging in late fall 2017. In January 2018, he was switched to Taxol/Cyramza with slight progression and neuropathy and clinical trial became available. In March 2018, he was started on the NCI Match Clinical Trial with crizotinib (MET amplification). He remained on crizotinib March - July 2018, but was found to have progressive disease. He switched to Taxol/cyramza on 7/24/18 and needed treatment modification to days 1,8 every 21 days with cycle 2 due to neutropenia. With progressive disease on taxol/cyramza, he started palliative Keytruda on 10/23/2018. He has low PDL1 expression.      #History of PE - on lovenox 130mg BID, currently on HOLD for procedure.      #Neuropathy - at baseline. Gabapentin 600/300/600 in addition to gabapentin gel.    Patient and plan of care discussed with staff attending, Dr. Bryant.     Radha Camejo PA-C  Hematology/Oncology  [phone]    Pending Results   These results will be followed up by outpatient team  Unresulted Labs Ordered in the  Past 30 Days of this Admission     Date and Time Order Name Status Description    11/2/2018 1034 Blood Morphology Pathologist Review In process           Code Status   Full Code    Primary Care Physician   Physician No Ref-Primary    Physical Exam   Temp: 96.2  F (35.7  C) Temp src: Oral BP: 129/88 Pulse: 103   Resp: 18 SpO2: 98 % O2 Device: None (Room air)    Vitals:    11/01/18 1723 11/02/18 0812   Weight: (!) 158.4 kg (349 lb 2.2 oz) (!) 157.2 kg (346 lb 9.6 oz)     Vital Signs with Ranges  Temp:  [96.2  F (35.7  C)-97.9  F (36.6  C)] 97.9  F (36.6  C)  Pulse:  [103-115] 115  Resp:  [16-18] 16  BP: (129-134)/(86-88) 134/86  SpO2:  [96 %-98 %] 96 %  I/O last 3 completed shifts:  In: 3093.92 [P.O.:476; I.V.:2617.92]  Out: 1450 [Urine:1450]    Constitutional: Awake, alert, cooperative, no apparent distress, and appears stated age.  Eyes: Lids and lashes normal, pupils equal, round and reactive to light, extra ocular muscles intact, sclera clear, conjunctiva normal.  ENT: Normocephalic, without obvious abnormality, atraumatic.  Respiratory: No increased work of breathing, good air exchange, clear to auscultation bilaterally, no crackles or wheezing.  Cardiovascular: Tachycardic, regular rhythm, no murmur.  GI: Normal bowel sounds, obese, soft, non-distended, non-tender.  Neurologic: No focal deficits.  Neuropsychiatric: Calm, normal eye contact, alert, normal affect, oriented to self, place, time and situation, memory for past and recent events intact and thought process normal.    Discharge Disposition   Discharged to home  Condition at discharge: Stable    Consultations This Hospital Stay   THORACIC SURGERY ADULT IP CONSULT  NUTRITION SERVICES ADULT IP CONSULT  PHARMACY IP CONSULT    Discharge Orders     CBC with platelets differential   Last Lab Result: Hemoglobin (g/dL)      Date                     Value                11/02/2018               10.8 (L)         ----------     Comprehensive metabolic panel      Home infusion referral     Reason for your hospital stay   You were hospitalized for esophageal obstruction. Unable to place a feeding tube due to low counts and scheduling conflict with Thoracic Surgery team.  Started Neupogen in hospital. You will need Neupogen on Saturday 11/3 and Sunday 11/4, and then have labs checked on Monday morning, 11/5.     Adult [facility] Follow-up and recommended labs and tests   You will need Neupogen on Saturday 11/3 and Sunday 11/4, and then have labs checked on Monday morning, 11/5.  I sent a message to Dr. Dee asking her to please follow up on your counts on Monday morning to ensure it is safe to proceed with surgery.    Appointments on [place] and/or [facility] (with [facility] or [facility] provider or service). Call [phone] if you haven't heard regarding these appointments within 7 days of discharge.     Activity   Your activity upon discharge: activity as tolerated     When to contact your care team   Please call the [facility] [phone] for fever (temp >100.4), chills, uncontrolled nausea, vomiting, constipation, or diarrhea, unrelieved pain, bleeding not relieved with pressure, dizziness, chest pain, shortness of breath, loss of consciousness, and any new or concerning symptoms.     Discharge Instructions   HOLD LOVENOX Sunday night and Monday morning     DNR (Do Not Resuscitate)     Diet   Follow this diet upon discharge: Full liquid       Discharge Medications   Discharge Medication List as of 11/2/2018  4:33 PM      START taking these medications    Details   filgrastim (NEUPOGEN) 480 MCG/0.8ML SOSY syringe Inject 0.8 mLs (480 mcg) Subcutaneous daily for 3 days, Disp-2.4 mL, R-0, E-Prescribe         CONTINUE these medications which have NOT CHANGED    Details   diphenoxylate-atropine (LOMOTIL) 2.5-0.025 MG per tablet Take 1 tablet by mouth 4 times daily as needed for diarrhea, Disp-40 tablet, R-1, Local Print       enoxaparin (LOVENOX) 150 MG/ML injection INJECT 0.86ML (130MG) TWICE A DAY FOR ACUTE PULMONARY EMBOLISM AND ESOPHAGEAL MALIGNANCY, Disp-60 mL, R-3, E-Prescribe9/10/188      gabapentin (NEURONTIN) 300 MG capsule Two tablets in the am and one tablet in the afternoon and evening, Disp-180 capsule, R-1, E-Prescribe      latanoprost (XALATAN) 0.005 % ophthalmic solution Place 1 drop into both eyes At Bedtime, Disp-1 Bottle, R-0, E-Prescribe      lisinopril (PRINIVIL/ZESTRIL) 40 MG tablet Take 0.5 tablets (20 mg) by mouth daily, Disp-30 tablet, R-1, Historical      !! LORazepam (ATIVAN) 0.5 MG tablet Take 1 tablet (0.5 mg) by mouth every 4 hours as needed (Anxiety, Nausea/Vomiting or Sleep), Disp-30 tablet, R-2, Local Print      metoclopramide (REGLAN) 10 MG tablet Take 1 tablet (10 mg) by mouth 3 times daily, Disp-120 tablet, R-3, E-Prescribe      multivitamin, therapeutic with minerals (MULTI-VITAMIN) TABS tablet Take 1 tablet by mouth daily, Historical      omeprazole (PRILOSEC) 40 MG capsule Take 1 capsule (40 mg) by mouth daily, Disp-90 capsule, R-1, E-Prescribe      timolol (TIMOPTIC) 0.5 % ophthalmic solution Place into both eyes daily, Historical      zolpidem (AMBIEN) 10 MG tablet Take 1 tablet (10 mg) by mouth nightly as needed, Disp-60 tablet, R-1, No Print OutCalled in to [employer]      fish oil-omega-3 fatty acids 1000 MG capsule Take 2 g by mouth daily, Historical      ketamine 5% gabapentin 8% lidocaine 2.5% topical PLO cream Use small amount topically to feet three times daily, Disp-30 g, R-3, Local PrintPt would like this mailed to his house      lidocaine-prilocaine (EMLA) cream Apply topically as needed for moderate painDisp-30 g, [id number]      !! LORazepam (ATIVAN) 0.5 MG tablet Take 1 tablet (0.5 mg) by mouth every 4 hours as needed (Anxiety, Nausea/Vomiting or Sleep), Disp-30 tablet, R-2, Local Print      ondansetron (ZOFRAN) 8 MG tablet Take 1 tablet (8 mg) by mouth every 8 hours as needed  (Nausea/Vomiting), Disp-30 tablet, R-2, E-Prescribe      prochlorperazine (COMPAZINE) 10 MG tablet Take 1 tablet (10 mg) by mouth every 6 hours as needed (Nausea/Vomiting), Disp-30 tablet, R-2, E-Prescribe      tadalafil (CIALIS) 5 MG tablet Take 2 tablets (10 mg) by mouth every 24 hours As needed for sexual activity, Disp-20 tablet, R-1, E-Prescribe       !! - Potential duplicate medications found. Please discuss with provider.        Allergies   Allergies   Allergen Reactions     Penicillin G Other (See Comments)     Pt not sure-     Penicillins Unknown     Data   Most Recent 3 CBC's:  Recent Labs   Lab Test  11/02/18   0905  11/01/18   1251  10/23/18   1154   WBC  1.9*  2.7*  3.3*   HGB  10.8*  11.7*  11.8*   MCV  90  90  92   PLT  92*  129*  150      Most Recent 3 BMP's:  Recent Labs   Lab Test  11/02/18   0905  11/01/18   1251  10/23/18   1154  10/10/18   1449   NA  142  141   --   142   POTASSIUM  3.9  4.3   --   3.9   CHLORIDE  112*  109   --   110*   CO2  23  23   --   25   BUN  16  19   --   10   CR  0.89  1.29*  0.89  0.76   ANIONGAP  8  9   --   7   NIDHI  8.1*  8.6   --   8.4*   GLC  102*  92   --   92     Most Recent 2 LFT's:  Recent Labs   Lab Test  11/02/18   0905  11/01/18   1251   AST  18  21   ALT  16  20   ALKPHOS  68  79   BILITOTAL  0.9  1.5*     Most Recent INR's and Anticoagulation Dosing History:  Anticoagulation Dose History     Recent Dosing and Labs Latest Ref Rng & Units 5/2/2017 5/9/2017 12/27/2017 10/10/2018 11/2/2018    INR 0.86 - 1.14 - 1.16(H) 1.09 1.10 1.12    INR Point of Care 0.86 - 1.14 1.0 - - - -        Most Recent 3 Troponin's:  Recent Labs   Lab Test  11/02/18   0539  11/02/18   0003  01/15/18   0920   TROPI  <0.015  <0.015  <0.015     Most Recent Cholesterol Panel:No lab results found.  Most Recent 6 Bacteria Isolates From Any Culture (See EPIC Reports for Culture Details):No lab results found.  Most Recent TSH, T4 and A1c Labs:  Recent Labs   Lab Test  10/23/18   1154   TSH   3.04     Results for orders placed or performed in visit on 11/01/18   XR Esophagram w Upper GI    Narrative    Esophagram and upper GI dated 11/1/2018    COMPARISON:    CT 10/10/2018    HISTORY:    Distal esophageal mass, dysphagia    TECHNIQUE: Patient was given effervescent granules and thin and thick  barium. Patient was evaluated with fluoroscopy, and multiple spot  films were obtained.     Fluoroscopy time was 2.5 minutes.    FINDINGS: Single contrast esophagram was performed. There is mucosal  irregularity distal esophagus at the gastroesophageal junction, with  proximal dilation of the esophagus and stasis of contrast. Primary and  secondary peristalsis appear normal. Nonpropulsive contractions are  seen within the distal esophagus.     The contrast did pass readily through the esophagus and into the  stomach. The rugal pattern the stomach appears unremarkable. No  mucosal abnormalities are identified within the duodenum. The  visualized jejunum appears unremarkable.      Impression    Impression:   1. Mucosal irregularity of the distal esophagus at the  gastroesophageal junction, corresponding to known esophageal mass seen  previously on CT on 10/10/2018. Contrast passed readily through the  esophagus and into the stomach.  2. Dilatation of the distal esophagus and stasis of contrast, which is  pronounced in supine position.     I have personally reviewed the examination and initial interpretation  and I agree with the findings.    HAYLEE DOMINGO MD

## 2018-11-02 NOTE — PHARMACY-CONSULT NOTE
Pharmacy Tube Feeding Consult    Medication reviewed for administration by feeding tube and for potential food/drug interactions.    Recommendation: No changes are needed at this time.     Pharmacy will continue to follow as new medications are ordered.    Keyonna Putnam, LisbetD

## 2018-11-02 NOTE — PLAN OF CARE
Problem: Fluid Volume Deficit (Adult)  Goal: Identify Related Risk Factors and Signs and Symptoms  Related risk factors and signs and symptoms are identified upon initiation of Human Response Clinical Practice Guideline (CPG).   Outcome: No Change  Nursing Focus: Admission  D: Arrived from clinic. Patient accompanied by his sister. Admitted for weight loss, decreased oral intake, dehydration, elevated creatine, and need of PEG tube placement . Complains aching knees and hands.    I: Admission process began.  Patient oriented to room, enviroment, call light.  Md. notified of patients arrival on unit.     A: Vital signs stable, afebrile.  Patient stable at this time.  Orthostatic vs assess, administered 1L bolus , iv hydration continued. Pt consented for possible PEG placement tomorrow by [facility] physician.      P: Pt will be NPO after MN for procedure. Will continue to hydrate. Implement plan of care when available. Continue to monitor patient. Nursing interventions as appropriate. Notify md with changes in pt status.

## 2018-11-02 NOTE — PROGRESS NOTES
Care Coordinator Progress Note    Admission Date/Time:  11/1/2018  Attending MD:  Carlene Bryant MD    Data  Chart reviewed, discussed with interdisciplinary team.   Patient was admitted for:     Concerns with insurance coverage for discharge needs: benefit check pending for enteral nutrition coverage.  Current Living Situation: Patient lives alone.  Support System: Supportive and Involved  Services Involved: none immediately prior to admission but has been open to [facility] in the past for IV antibiotics  Transportation at Discharge: Family or friend will provide  Transportation to Medical Appointments:   - Not applicable  Barriers to Discharge: PEG tube placement and initiation of tube feedings    Coordination of Care and Referrals: Provided patient/family with options for Home Infusion for new enteral nutrition. Patient prefers [facility]. Benefit check initiated.  [facility] called and patient added to waitlist for Sunday 11/4.     Assessment  Patient with metastatic esophageal adenocarcinoma, with progressive disease admitted from clinic with symptoms of esophageal obstruction. Per Heme/Onc team, plan is for PEG tube placement this evening and initiation of tube feedings. Tube feeding with be patient's sole source of nutrition. RNCC met with patient to discuss discharge planning. He reports no enteral nutrition referrals were made prior to admission. No education plans were in place. We discussed starting a benefit check and trying to get into [facility] if possible prior to discharge. Patient had no questions at this time      Addendum 1615: Notified by MD team that PEG tube is cancelled as patient is neutropenic. Patient now needing Neupogen scheduled Sat 11/3 and Sunday 11/4. First choice was [facility]. Spoke to this clinic and they are not open over weekend. Patient willing to come to [facility].   [facility] reservation and caregiver exception form provided to Radha Camejo  SWAPNIL for completion.     [facility] updated on change in plan. They will keep enteral nutrition referral pending.  Plan  Anticipated Discharge Date:  Today with close outpatient follow up  Anticipated Discharge Plan:  home    Roxie Friend  7D Heme/Onc RN Care Coordinator  Pager [phone]  Weekend RN Care Coordinator: job code [phone]

## 2018-11-03 ENCOUNTER — ALLIED HEALTH/NURSE VISIT (OUTPATIENT)
Dept: ONCOLOGY | Facility: CLINIC | Age: 58
End: 2018-11-03
Attending: INTERNAL MEDICINE
Payer: COMMERCIAL

## 2018-11-03 VITALS
DIASTOLIC BLOOD PRESSURE: 86 MMHG | OXYGEN SATURATION: 96 % | HEART RATE: 115 BPM | TEMPERATURE: 97.9 F | SYSTOLIC BLOOD PRESSURE: 134 MMHG | RESPIRATION RATE: 16 BRPM

## 2018-11-03 DIAGNOSIS — C15.5 CANCER OF DISTAL THIRD OF ESOPHAGUS (H): ICD-10-CM

## 2018-11-03 DIAGNOSIS — D70.1 CHEMOTHERAPY-INDUCED NEUTROPENIA (H): Primary | ICD-10-CM

## 2018-11-03 DIAGNOSIS — T45.1X5A CHEMOTHERAPY-INDUCED NEUTROPENIA (H): Primary | ICD-10-CM

## 2018-11-03 PROCEDURE — 25000128 H RX IP 250 OP 636: Mod: ZF | Performed by: PHYSICIAN ASSISTANT

## 2018-11-03 RX ADMIN — FILGRASTIM 480 MCG: 480 INJECTION, SOLUTION INTRAVENOUS; SUBCUTANEOUS at 11:33

## 2018-11-03 ASSESSMENT — PAIN SCALES - GENERAL: PAINLEVEL: MILD PAIN (3)

## 2018-11-03 NOTE — MR AVS SNAPSHOT
After Visit Summary   11/3/2018    Reuben Padilla    MRN: [id number]           Patient Information     Date Of Birth          1960        Visit Information        Provider Department      11/3/2018 12:30 PM Nurse, Roger Oncology Injection [facility]        Today's Diagnoses     Chemotherapy-induced neutropenia (H)    -  1    Cancer of distal third of esophagus (H)          Care Instructions    Contact Numbers  [facility]: [phone]    After Hours:  [phone]  Triage: [phone]    Please call the [facility] Triage line if you experience a temperature greater than or equal to 100.5, shaking chills, have uncontrolled nausea, vomiting and/or diarrhea, dizziness, shortness of breath, chest pain, bleeding, unexplained bruising, or if you have any other new/concerning symptoms, questions or concerns.     If it is after hours, weekends, or holidays, please call the main hospital  at  [phone] and ask to speak to the Oncology doctor on call.     If you are having any concerning symptoms or wish to speak to a provider before your next infusion visit, please call your care coordinator or triage to notify them so we can adequately serve you.     If you need a refill on a narcotic prescription or other medication, please call triage before your infusion appointment.             Follow-ups after your visit        Your next 10 appointments already scheduled     Nov 04, 2018 12:30 PM CST   (Arrive by 12:15 PM)   INJECTION with  Oncology Injection Nurse   [facility] ([facility])    [address] [place] [address]  [address] [place] [address]   [phone]            Nov 05, 2018  8:15 AM CST   [facility] Lab Draw with  [email or web address] LAB DRAW   [facility] Draw ([facility])    [address] [place] [address]  [address]  [facility] [address]   [phone]            Nov 05, 2018   Procedure with  Lyn WEISS MD   [facility] [place], Same Day Surgery (--)    [address]  [facility] [place] [address]   [phone]            Nov 13, 2018  9:00 AM CST   [facility] Lab Draw with  [facility] LAB DRAW   [facility] Lab Draw ([facility])    [address]  [address]  [facility] [address]   [phone]            Nov 13, 2018  9:30 AM CST   (Arrive by 9:15 AM)   Return Visit with Loraine Sutherland PA-C   [facility] ([facility])    [address]  [address]  [facility] [address]   [phone]            Nov 13, 2018 12:30 PM CST   Infusion 60 with UC ONCOLOGY INFUSION, UC 22 ATC   [facility] ([facility])    [address]  [address]  [facility] [address]   [phone]            Nov 20, 2018 11:00 AM CST   (Arrive by 10:45 AM)   New Patient Visit with Nieves Butcher MD   [facility] [place] ([facility])    [address]  [address]  [facility] [address]   [phone]            Dec 03, 2018  8:00 AM CST   [facility] Lab Draw with  [facility] LAB DRAW   [facility] ([facility])    [address]  [address]  [facility] [address]   [phone]            Dec 03, 2018  8:30 AM CST   (Arrive by 8:15 AM)   Return Visit with Kadi Dee MD   [facility] [place] ([facility])    [address]  [address]  [facility] [address] [phone]              Who to contact     If you have questions or need follow up information about today's clinic visit or your schedule please contact [facility] directly at [phone].  Normal or non-critical lab and imaging results will be communicated to you by MyChart, letter or phone within 4 business days after the clinic has received the results. If you do not hear from us  within 7 days, please contact the clinic through [employer] or phone. If you have a critical or abnormal lab result, we will notify you by phone as soon as possible.  Submit refill requests through [employer] or call your pharmacy and they will forward the refill request to us. Please allow 3 business days for your refill to be completed.          Additional Information About Your Visit        [employer] Information     [employer] gives you secure access to your electronic health record. If you see a primary care provider, you can also send messages to your care team and make appointments. If you have questions, please call your primary care clinic.  If you do not have a primary care provider, please call [phone] and they will assist you.        Care EveryWhere ID     This is your Care EveryWhere ID. This could be used by other organizations to access your [facility] medical records  [id number]        Your Vitals Were     Pulse Temperature Respirations Pulse Oximetry          115 97.9  F (36.6  C) (Oral) 16 96%         Blood Pressure from Last 3 Encounters:   11/03/18 134/86   11/02/18 129/88   11/01/18 (!) 88/66    Weight from Last 3 Encounters:   11/02/18 (!) 157.2 kg (346 lb 9.6 oz)   11/01/18 (!) 156.1 kg (344 lb 3.2 oz)   10/23/18 (!) 163.2 kg (359 lb 11.2 oz)              Today, you had the following     No orders found for display         Today's Medication Changes          These changes are accurate as of 11/3/18 12:50 PM.  If you have any questions, ask your nurse or doctor.               These medicines have changed or have updated prescriptions.        Dose/Directions    gabapentin 300 MG capsule   Commonly known as:  NEURONTIN   This may have changed:    - how much to take  - when to take this  - additional instructions   Used for:  Chemotherapy-induced neuropathy (H)        Two tablets in the am and one tablet in the afternoon and evening   Quantity:  180 capsule   Refills:  1                Primary Care  Provider Fax #    Physician No Ref-Primary [phone]       No address on file        Equal Access to Services     OLLIE SHARPE : Hadii aad ku hadmadison Dunlap, dillonda umairangelita, nancy sotelo, geraldo johnnyin hayaaotilio dhillonlauryn munson padmini cardenas. So [facility] [phone].    ATENCIÓN: Si habla español, tiene a alamo disposición servicios gratuitos de asistencia lingüística. Ariannaame al [phone].    We comply with applicable federal civil rights laws and [place] laws. We do not discriminate on the basis of race, color, national origin, age, disability, sex, sexual orientation, or gender identity.            Thank you!     Thank you for choosing [facility]  for your care. Our goal is always to provide you with excellent care. Hearing back from our patients is one way we can continue to improve our services. Please take a few minutes to complete the written survey that you may receive in the mail after your visit with us. Thank you!             Your Updated Medication List - Protect others around you: Learn how to safely use, store and throw away your medicines at www.disposemymeds.org.          This list is accurate as of 11/3/18 12:50 PM.  Always use your most recent med list.                   Brand Name Dispense Instructions for use Diagnosis    diphenoxylate-atropine 2.5-0.025 MG per tablet    LOMOTIL    40 tablet    Take 1 tablet by mouth 4 times daily as needed for diarrhea    Diarrhea, unspecified type       enoxaparin 150 MG/ML injection    LOVENOX    60 mL    INJECT 0.86ML (130MG) TWICE A DAY FOR ACUTE PULMONARY EMBOLISM AND ESOPHAGEAL MALIGNANCY    Hx of pulmonary embolus       filgrastim 480 MCG/0.8ML Sosy syringe    NEUPOGEN    2.4 mL    Inject 0.8 mLs (480 mcg) Subcutaneous daily for 3 days    Cancer of distal third of esophagus (H), Chemotherapy-induced neutropenia (H)       fish oil-omega-3 fatty acids 1000 MG capsule      Take 2 g by mouth daily        gabapentin 300 MG capsule     NEURONTIN    180 capsule    Two tablets in the am and one tablet in the afternoon and evening    Chemotherapy-induced neuropathy (H)       ketamine 5% gabapentin 8% lidocaine 2.5% topical PLO cream     30 g    Use small amount topically to feet three times daily    Cancer of distal third of esophagus (H), Other secondary hypertension       latanoprost 0.005 % ophthalmic solution    XALATAN    1 Bottle    Place 1 drop into both eyes At Bedtime    Cancer of distal third of esophagus (H)       lidocaine-prilocaine cream    EMLA    30 g    Apply topically as needed for moderate pain    Cancer of distal third of esophagus (H)       lisinopril 40 MG tablet    PRINIVIL/ZESTRIL    30 tablet    Take 0.5 tablets (20 mg) by mouth daily        * LORazepam 0.5 MG tablet    ATIVAN    30 tablet    Take 1 tablet (0.5 mg) by mouth every 4 hours as needed (Anxiety, Nausea/Vomiting or Sleep)    Cancer of distal third of esophagus (H)       * LORazepam 0.5 MG tablet    ATIVAN    30 tablet    Take 1 tablet (0.5 mg) by mouth every 4 hours as needed (Anxiety, Nausea/Vomiting or Sleep)    Cancer of distal third of esophagus (H)       metoclopramide 10 MG tablet    REGLAN    120 tablet    Take 1 tablet (10 mg) by mouth 3 times daily    Gastroparesis       Multi-vitamin Tabs tablet      Take 1 tablet by mouth daily        omeprazole 40 MG capsule    priLOSEC    90 capsule    Take 1 capsule (40 mg) by mouth daily    Cancer of distal third of esophagus (H)       ondansetron 8 MG tablet    ZOFRAN    30 tablet    Take 1 tablet (8 mg) by mouth every 8 hours as needed (Nausea/Vomiting)    Cancer of distal third of esophagus (H)       prochlorperazine 10 MG tablet    COMPAZINE    30 tablet    Take 1 tablet (10 mg) by mouth every 6 hours as needed (Nausea/Vomiting)    Cancer of distal third of esophagus (H)       tadalafil 5 MG tablet    CIALIS    20 tablet    Take 2 tablets (10 mg) by mouth every 24 hours As needed for sexual activity    Malignant  neoplasm of lower third of esophagus (H), Drug-induced erectile dysfunction       timolol 0.5 % ophthalmic solution    TIMOPTIC     Place into both eyes daily    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 Read the directions carefully, and ask your doctor or other care provider to review them with you.

## 2018-11-03 NOTE — PATIENT INSTRUCTIONS
Contact Numbers  [facility]: [phone]    After Hours:  [phone]  Triage: [phone]    Please call the [facility] Triage line if you experience a temperature greater than or equal to 100.5, shaking chills, have uncontrolled nausea, vomiting and/or diarrhea, dizziness, shortness of breath, chest pain, bleeding, unexplained bruising, or if you have any other new/concerning symptoms, questions or concerns.     If it is after hours, weekends, or holidays, please call the main hospital  at  [phone] and ask to speak to the Oncology doctor on call.     If you are having any concerning symptoms or wish to speak to a provider before your next infusion visit, please call your care coordinator or triage to notify them so we can adequately serve you.     If you need a refill on a narcotic prescription or other medication, please call triage before your infusion appointment.

## 2018-11-03 NOTE — PROGRESS NOTES
Infusion Nursing Note:  Reuben BORIS Jainpaula presents today for Neupogen.    Patient seen by provider today: No    Treatment Conditions:  Not Applicable.    Intravenous Access:  No Intravenous access/labs at this visit.        Note:   Results reviewed, copy given to patient.  Proceed with treatment.    Copy of AVS given to patient.   Tolerated injection into R arm without incident. No Prescriptions filled today.   D/C in care of self.  Pt will return 11/4 for next appointment.       Barbara Charles RN

## 2018-11-04 ENCOUNTER — ALLIED HEALTH/NURSE VISIT (OUTPATIENT)
Dept: ONCOLOGY | Facility: CLINIC | Age: 58
End: 2018-11-04
Attending: INTERNAL MEDICINE
Payer: COMMERCIAL

## 2018-11-04 VITALS
OXYGEN SATURATION: 97 % | SYSTOLIC BLOOD PRESSURE: 145 MMHG | DIASTOLIC BLOOD PRESSURE: 77 MMHG | TEMPERATURE: 97.7 F | HEART RATE: 110 BPM | RESPIRATION RATE: 16 BRPM

## 2018-11-04 DIAGNOSIS — D70.1 CHEMOTHERAPY-INDUCED NEUTROPENIA (H): Primary | ICD-10-CM

## 2018-11-04 DIAGNOSIS — C15.5 CANCER OF DISTAL THIRD OF ESOPHAGUS (H): ICD-10-CM

## 2018-11-04 DIAGNOSIS — T45.1X5A CHEMOTHERAPY-INDUCED NEUTROPENIA (H): Primary | ICD-10-CM

## 2018-11-04 PROCEDURE — 25000128 H RX IP 250 OP 636: Mod: ZF | Performed by: PHYSICIAN ASSISTANT

## 2018-11-04 PROCEDURE — 96372 THER/PROPH/DIAG INJ SC/IM: CPT

## 2018-11-04 RX ADMIN — FILGRASTIM 480 MCG: 480 INJECTION, SOLUTION INTRAVENOUS; SUBCUTANEOUS at 12:07

## 2018-11-04 ASSESSMENT — PAIN SCALES - GENERAL: PAINLEVEL: NO PAIN (0)

## 2018-11-04 NOTE — PROGRESS NOTES
Infusion Nursing Note:  Reuben ESCALANTE Cristobalpaula presents today for Neupogen.    Patient seen by provider today: No    Treatment Conditions:  Not Applicable.    Intravenous Access:  No Intravenous access/labs at this visit.        Note:   Results reviewed, copy given to patient.  Proceed with treatment.    Copy of AVS given to patient.   Tolerated injection into left arm without incident. No Prescriptions filled today.   D/C in care of self.  Pt will return 11/5 for labs and possible G tube placement.       Barbara Charles RN

## 2018-11-04 NOTE — PATIENT INSTRUCTIONS
Contact Numbers  [facility]: [phone]    After Hours:  [phone]  Triage: [phone]    Please call the [facility] Triage line if you experience a temperature greater than or equal to 100.5, shaking chills, have uncontrolled nausea, vomiting and/or diarrhea, dizziness, shortness of breath, chest pain, bleeding, unexplained bruising, or if you have any other new/concerning symptoms, questions or concerns.     If it is after hours, weekends, or holidays, please call the main hospital  at  [phone] and ask to speak to the Oncology doctor on call.     If you are having any concerning symptoms or wish to speak to a provider before your next infusion visit, please call your care coordinator or triage to notify them so we can adequately serve you.     If you need a refill on a narcotic prescription or other medication, please call triage before your infusion appointment.

## 2018-11-04 NOTE — MR AVS SNAPSHOT
After Visit Summary   11/4/2018    Reuben Padilla    MRN: [id number]           Patient Information     Date Of Birth          1960        Visit Information        Provider Department      11/4/2018 12:30 PM Nurse, Roger Oncology Injection [facility]        Today's Diagnoses     Chemotherapy-induced neutropenia (H)    -  1    Cancer of distal third of esophagus (H)          Care Instructions    Contact Numbers  [facility]: [phone]    After Hours:  [phone]  Triage: [phone]    Please call the [facility] Triage line if you experience a temperature greater than or equal to 100.5, shaking chills, have uncontrolled nausea, vomiting and/or diarrhea, dizziness, shortness of breath, chest pain, bleeding, unexplained bruising, or if you have any other new/concerning symptoms, questions or concerns.     If it is after hours, weekends, or holidays, please call the main hospital  at  [phone] and ask to speak to the Oncology doctor on call.     If you are having any concerning symptoms or wish to speak to a provider before your next infusion visit, please call your care coordinator or triage to notify them so we can adequately serve you.     If you need a refill on a narcotic prescription or other medication, please call triage before your infusion appointment.             Follow-ups after your visit        Your next 10 appointments already scheduled     Nov 05, 2018  8:15 AM CST   [facility] Lab Draw with  [facility] LAB DRAW   [facility] ([facility])    [address]  [address]  [facility] [address] [phone]            Nov 05, 2018   Procedure with Lyn WESIS MD   [facility] [place] Same Day Surgery (--)    [address] [address]   [phone]            Nov 13, 2018  9:00 AM CST   [facility] Lab Draw with  MASONIC LAB DRAW   [place] [facility] Draw ([facility])     [address]  [address]  [facility] [address]   [phone]            Nov 13, 2018  9:30 AM CST   (Arrive by 9:15 AM)   Return Visit with Loraine Sutherland PA-C   [facility] ([facility])    [address] [place] [address]  [address]  [facility] [address]   [phone]            Nov 13, 2018 12:30 PM CST   Infusion 60 with UC ONCOLOGY INFUSION, UC 22 ATC   [facility] ([facility])    [address] [place] [address]  [address]  [facility] [address]   [phone]            Nov 20, 2018 11:00 AM CST   (Arrive by 10:45 AM)   New Patient Visit with Nieves Butcher MD   [facility] ([facility])    [address] [place] [address]  [address]  [facility] [address]   [phone]            Dec 03, 2018  8:00 AM CST   M[facility]ic Lab Draw with UC MASONIC LAB DRAW   [facility] Lab Draw ([facility])    [address] [place] [address]  [address]  [facility] [address]   [phone]            Dec 03, 2018  8:30 AM CST   (Arrive by 8:15 AM)   Return Visit with Kadi Dee MD   [facility] ([facility])    [address] [place] [address]  [address]  [facility] [address]   [phone]            Dec 03, 2018 10:00 AM CST   Infusion 60 with UC ONCOLOGY INFUSION   [facility] ([facility])    [address] [place] [address]  [address]  [facility] [address]   [phone]              Who to contact     If you have questions or need follow up information about today's clinic visit or your schedule please contact [facility] directly at [phone].  Normal or non-critical lab and imaging results will be communicated to you by MyChart, letter or phone within 4 business days after the clinic has received the results. If you do not hear from us within 7 days, please contact the  clinic through [employer] or phone. If you have a critical or abnormal lab result, we will notify you by phone as soon as possible.  Submit refill requests through [employer] or call your pharmacy and they will forward the refill request to us. Please allow 3 business days for your refill to be completed.          Additional Information About Your Visit        [employer] Information     [employer] gives you secure access to your electronic health record. If you see a primary care provider, you can also send messages to your care team and make appointments. If you have questions, please call your primary care clinic.  If you do not have a primary care provider, please call [phone] and they will assist you.        Care EveryWhere ID     This is your Care EveryWhere ID. This could be used by other organizations to access your [facility] medical records  [id number]        Your Vitals Were     Pulse Temperature Respirations Pulse Oximetry          110 97.7  F (36.5  C) (Oral) 16 97%         Blood Pressure from Last 3 Encounters:   11/04/18 145/77   11/03/18 134/86   11/02/18 129/88    Weight from Last 3 Encounters:   11/02/18 (!) 157.2 kg (346 lb 9.6 oz)   11/01/18 (!) 156.1 kg (344 lb 3.2 oz)   10/23/18 (!) 163.2 kg (359 lb 11.2 oz)              Today, you had the following     No orders found for display         Today's Medication Changes          These changes are accurate as of 11/4/18  1:14 PM.  If you have any questions, ask your nurse or doctor.               These medicines have changed or have updated prescriptions.        Dose/Directions    gabapentin 300 MG capsule   Commonly known as:  NEURONTIN   This may have changed:    - how much to take  - when to take this  - additional instructions   Used for:  Chemotherapy-induced neuropathy (H)        Two tablets in the am and one tablet in the afternoon and evening   Quantity:  180 capsule   Refills:  1                Primary Care Provider Fax #    Physician No Ref-Primary  [phone]       No address on file        Equal Access to Services     OLLIE ANNEMARIE : Hadii antonio harrison harleen Dunlap, yeyo renaleksandra, nancy corona jacquieluz mariajuancarlos, waxrachel johnnyin hayaaotilio paintingpalthao cardenas. So [facility] [phone].    ATENCIÓN: Si habla español, tiene a alamo disposición servicios gratuitos de asistencia lingüística. Llame al [phone].    We comply with applicable federal civil rights laws and [place] laws. We do not discriminate on the basis of race, color, national origin, age, disability, sex, sexual orientation, or gender identity.            Thank you!     Thank you for choosing [facility]  for your care. Our goal is always to provide you with excellent care. Hearing back from our patients is one way we can continue to improve our services. Please take a few minutes to complete the written survey that you may receive in the mail after your visit with us. Thank you!             Your Updated Medication List - Protect others around you: Learn how to safely use, store and throw away your medicines at www.disposemymeds.org.          This list is accurate as of 11/4/18  1:14 PM.  Always use your most recent med list.                   Brand Name Dispense Instructions for use Diagnosis    diphenoxylate-atropine 2.5-0.025 MG per tablet    LOMOTIL    40 tablet    Take 1 tablet by mouth 4 times daily as needed for diarrhea    Diarrhea, unspecified type       enoxaparin 150 MG/ML injection    LOVENOX    60 mL    INJECT 0.86ML (130MG) TWICE A DAY FOR ACUTE PULMONARY EMBOLISM AND ESOPHAGEAL MALIGNANCY    Hx of pulmonary embolus       filgrastim 480 MCG/0.8ML Sosy syringe    NEUPOGEN    2.4 mL    Inject 0.8 mLs (480 mcg) Subcutaneous daily for 3 days    Cancer of distal third of esophagus (H), Chemotherapy-induced neutropenia (H)       fish oil-omega-3 fatty acids 1000 MG capsule      Take 2 g by mouth daily        gabapentin 300 MG capsule    NEURONTIN    180 capsule    Two tablets in  the am and one tablet in the afternoon and evening    Chemotherapy-induced neuropathy (H)       ketamine 5% gabapentin 8% lidocaine 2.5% topical PLO cream     30 g    Use small amount topically to feet three times daily    Cancer of distal third of esophagus (H), Other secondary hypertension       latanoprost 0.005 % ophthalmic solution    XALATAN    1 Bottle    Place 1 drop into both eyes At Bedtime    Cancer of distal third of esophagus (H)       lidocaine-prilocaine cream    EMLA    30 g    Apply topically as needed for moderate pain    Cancer of distal third of esophagus (H)       lisinopril 40 MG tablet    PRINIVIL/ZESTRIL    30 tablet    Take 0.5 tablets (20 mg) by mouth daily        * LORazepam 0.5 MG tablet    ATIVAN    30 tablet    Take 1 tablet (0.5 mg) by mouth every 4 hours as needed (Anxiety, Nausea/Vomiting or Sleep)    Cancer of distal third of esophagus (H)       * LORazepam 0.5 MG tablet    ATIVAN    30 tablet    Take 1 tablet (0.5 mg) by mouth every 4 hours as needed (Anxiety, Nausea/Vomiting or Sleep)    Cancer of distal third of esophagus (H)       metoclopramide 10 MG tablet    REGLAN    120 tablet    Take 1 tablet (10 mg) by mouth 3 times daily    Gastroparesis       Multi-vitamin Tabs tablet      Take 1 tablet by mouth daily        omeprazole 40 MG capsule    priLOSEC    90 capsule    Take 1 capsule (40 mg) by mouth daily    Cancer of distal third of esophagus (H)       ondansetron 8 MG tablet    ZOFRAN    30 tablet    Take 1 tablet (8 mg) by mouth every 8 hours as needed (Nausea/Vomiting)    Cancer of distal third of esophagus (H)       prochlorperazine 10 MG tablet    COMPAZINE    30 tablet    Take 1 tablet (10 mg) by mouth every 6 hours as needed (Nausea/Vomiting)    Cancer of distal third of esophagus (H)       tadalafil 5 MG tablet    CIALIS    20 tablet    Take 2 tablets (10 mg) by mouth every 24 hours As needed for sexual activity    Malignant neoplasm of lower third of esophagus (H),  Drug-induced erectile dysfunction       timolol 0.5 % ophthalmic solution    TIMOPTIC     Place into both eyes daily    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 Read the directions carefully, and ask your doctor or other care provider to review them with you.

## 2018-11-05 ENCOUNTER — CARE COORDINATION (OUTPATIENT)
Dept: ONCOLOGY | Facility: CLINIC | Age: 58
End: 2018-11-05

## 2018-11-05 ENCOUNTER — ANESTHESIA (OUTPATIENT)
Dept: SURGERY | Facility: CLINIC | Age: 58
End: 2018-11-05
Payer: COMMERCIAL

## 2018-11-05 ENCOUNTER — HOSPITAL ENCOUNTER (OUTPATIENT)
Facility: CLINIC | Age: 58
Setting detail: OBSERVATION
Discharge: HOME | End: 2018-11-07
Attending: STUDENT IN AN ORGANIZED HEALTH CARE EDUCATION/TRAINING PROGRAM | Admitting: THORACIC SURGERY (CARDIOTHORACIC VASCULAR SURGERY)
Payer: COMMERCIAL

## 2018-11-05 ENCOUNTER — APPOINTMENT (OUTPATIENT)
Dept: ULTRASOUND IMAGING | Facility: CLINIC | Age: 58
End: 2018-11-05
Attending: INTERNAL MEDICINE
Payer: COMMERCIAL

## 2018-11-05 DIAGNOSIS — Z93.1 G TUBE FEEDINGS (H): ICD-10-CM

## 2018-11-05 DIAGNOSIS — Z86.711 HX OF PULMONARY EMBOLUS: ICD-10-CM

## 2018-11-05 DIAGNOSIS — T45.1X5A CHEMOTHERAPY-INDUCED NEUTROPENIA (H): ICD-10-CM

## 2018-11-05 DIAGNOSIS — C15.5 CANCER OF DISTAL THIRD OF ESOPHAGUS (H): Primary | ICD-10-CM

## 2018-11-05 DIAGNOSIS — D70.1 CHEMOTHERAPY-INDUCED NEUTROPENIA (H): ICD-10-CM

## 2018-11-05 DIAGNOSIS — E43 SEVERE PROTEIN-CALORIE MALNUTRITION (H): ICD-10-CM

## 2018-11-05 DIAGNOSIS — C15.5 CANCER OF DISTAL THIRD OF ESOPHAGUS (H): ICD-10-CM

## 2018-11-05 PROBLEM — E46 MALNUTRITION (H): Status: ACTIVE | Noted: 2018-11-05

## 2018-11-05 LAB
ALBUMIN SERPL-MCNC: 3.3 G/DL (ref 3.4–5)
ALBUMIN SERPL-MCNC: 3.5 G/DL (ref 3.4–5)
ALP SERPL-CCNC: 83 U/L (ref 40–150)
ALP SERPL-CCNC: 87 U/L (ref 40–150)
ALT SERPL W P-5'-P-CCNC: 16 U/L (ref 0–70)
ALT SERPL W P-5'-P-CCNC: 19 U/L (ref 0–70)
ANION GAP SERPL CALCULATED.3IONS-SCNC: 10 MMOL/L (ref 3–14)
ANION GAP SERPL CALCULATED.3IONS-SCNC: 9 MMOL/L (ref 3–14)
AST SERPL W P-5'-P-CCNC: 17 U/L (ref 0–45)
AST SERPL W P-5'-P-CCNC: 19 U/L (ref 0–45)
BASOPHILS # BLD AUTO: 0 10E9/L (ref 0–0.2)
BASOPHILS NFR BLD AUTO: 0.4 %
BILIRUB DIRECT SERPL-MCNC: 0.2 MG/DL (ref 0–0.2)
BILIRUB SERPL-MCNC: 0.4 MG/DL (ref 0.2–1.3)
BILIRUB SERPL-MCNC: 0.8 MG/DL (ref 0.2–1.3)
BUN SERPL-MCNC: 15 MG/DL (ref 7–30)
BUN SERPL-MCNC: 15 MG/DL (ref 7–30)
CALCIUM SERPL-MCNC: 8.5 MG/DL (ref 8.5–10.1)
CALCIUM SERPL-MCNC: 8.7 MG/DL (ref 8.5–10.1)
CHLORIDE SERPL-SCNC: 109 MMOL/L (ref 94–109)
CHLORIDE SERPL-SCNC: 110 MMOL/L (ref 94–109)
CO2 SERPL-SCNC: 21 MMOL/L (ref 20–32)
CO2 SERPL-SCNC: 22 MMOL/L (ref 20–32)
COPATH REPORT: NORMAL
CREAT SERPL-MCNC: 0.94 MG/DL (ref 0.66–1.25)
CREAT SERPL-MCNC: 1 MG/DL (ref 0.66–1.25)
DIFFERENTIAL METHOD BLD: ABNORMAL
EOSINOPHIL # BLD AUTO: 0.1 10E9/L (ref 0–0.7)
EOSINOPHIL NFR BLD AUTO: 1.5 %
ERYTHROCYTE [DISTWIDTH] IN BLOOD BY AUTOMATED COUNT: 18 % (ref 10–15)
GFR SERPL CREATININE-BSD FRML MDRD: 77 ML/MIN/1.7M2
GFR SERPL CREATININE-BSD FRML MDRD: 83 ML/MIN/1.7M2
GLUCOSE BLDC GLUCOMTR-MCNC: 100 MG/DL (ref 70–99)
GLUCOSE SERPL-MCNC: 152 MG/DL (ref 70–99)
GLUCOSE SERPL-MCNC: 94 MG/DL (ref 70–99)
HCT VFR BLD AUTO: 35.6 % (ref 40–53)
HGB BLD-MCNC: 11.3 G/DL (ref 13.3–17.7)
IMM GRANULOCYTES # BLD: 0.1 10E9/L (ref 0–0.4)
IMM GRANULOCYTES NFR BLD: 0.6 %
LYMPHOCYTES # BLD AUTO: 1.2 10E9/L (ref 0.8–5.3)
LYMPHOCYTES NFR BLD AUTO: 14.1 %
MAGNESIUM SERPL-MCNC: 1.5 MG/DL (ref 1.6–2.3)
MCH RBC QN AUTO: 28.6 PG (ref 26.5–33)
MCHC RBC AUTO-ENTMCNC: 31.7 G/DL (ref 31.5–36.5)
MCV RBC AUTO: 90 FL (ref 78–100)
MONOCYTES # BLD AUTO: 0.6 10E9/L (ref 0–1.3)
MONOCYTES NFR BLD AUTO: 7.7 %
NEUTROPHILS # BLD AUTO: 6.2 10E9/L (ref 1.6–8.3)
NEUTROPHILS NFR BLD AUTO: 75.7 %
NRBC # BLD AUTO: 0 10*3/UL
NRBC BLD AUTO-RTO: 0 /100
PHOSPHATE SERPL-MCNC: 3.1 MG/DL (ref 2.5–4.5)
PLATELET # BLD AUTO: 124 10E9/L (ref 150–450)
POTASSIUM SERPL-SCNC: 3.7 MMOL/L (ref 3.4–5.3)
POTASSIUM SERPL-SCNC: 4.1 MMOL/L (ref 3.4–5.3)
PROT SERPL-MCNC: 7 G/DL (ref 6.8–8.8)
PROT SERPL-MCNC: 7 G/DL (ref 6.8–8.8)
RBC # BLD AUTO: 3.95 10E12/L (ref 4.4–5.9)
SODIUM SERPL-SCNC: 141 MMOL/L (ref 133–144)
SODIUM SERPL-SCNC: 141 MMOL/L (ref 133–144)
T4 FREE SERPL-MCNC: 1.39 NG/DL (ref 0.76–1.46)
TSH SERPL DL<=0.005 MIU/L-ACNC: 4.8 MU/L (ref 0.4–4)
WBC # BLD AUTO: 8.2 10E9/L (ref 4–11)

## 2018-11-05 PROCEDURE — 84439 ASSAY OF FREE THYROXINE: CPT | Performed by: PHYSICIAN ASSISTANT

## 2018-11-05 PROCEDURE — 84100 ASSAY OF PHOSPHORUS: CPT | Performed by: INTERNAL MEDICINE

## 2018-11-05 PROCEDURE — 85025 COMPLETE CBC W/AUTO DIFF WBC: CPT | Performed by: PHYSICIAN ASSISTANT

## 2018-11-05 PROCEDURE — P9041 ALBUMIN (HUMAN),5%, 50ML: HCPCS | Performed by: NURSE ANESTHETIST, CERTIFIED REGISTERED

## 2018-11-05 PROCEDURE — 36592 COLLECT BLOOD FROM PICC: CPT | Performed by: INTERNAL MEDICINE

## 2018-11-05 PROCEDURE — 25000128 H RX IP 250 OP 636: Performed by: SURGERY

## 2018-11-05 PROCEDURE — G0378 HOSPITAL OBSERVATION PER HR: HCPCS

## 2018-11-05 PROCEDURE — 71000014 ZZH RECOVERY PHASE 1 LEVEL 2 FIRST HR: Performed by: STUDENT IN AN ORGANIZED HEALTH CARE EDUCATION/TRAINING PROGRAM

## 2018-11-05 PROCEDURE — 25000566 ZZH SEVOFLURANE, EA 15 MIN: Performed by: STUDENT IN AN ORGANIZED HEALTH CARE EDUCATION/TRAINING PROGRAM

## 2018-11-05 PROCEDURE — 80053 COMPREHEN METABOLIC PANEL: CPT | Performed by: PHYSICIAN ASSISTANT

## 2018-11-05 PROCEDURE — 37000009 ZZH ANESTHESIA TECHNICAL FEE, EACH ADDTL 15 MIN: Performed by: STUDENT IN AN ORGANIZED HEALTH CARE EDUCATION/TRAINING PROGRAM

## 2018-11-05 PROCEDURE — 93970 EXTREMITY STUDY: CPT

## 2018-11-05 PROCEDURE — 27210794 ZZH OR GENERAL SUPPLY STERILE: Performed by: STUDENT IN AN ORGANIZED HEALTH CARE EDUCATION/TRAINING PROGRAM

## 2018-11-05 PROCEDURE — 82248 BILIRUBIN DIRECT: CPT | Performed by: PHYSICIAN ASSISTANT

## 2018-11-05 PROCEDURE — 96374 THER/PROPH/DIAG INJ IV PUSH: CPT

## 2018-11-05 PROCEDURE — 80053 COMPREHEN METABOLIC PANEL: CPT | Mod: 91 | Performed by: INTERNAL MEDICINE

## 2018-11-05 PROCEDURE — 25000125 ZZHC RX 250: Performed by: NURSE ANESTHETIST, CERTIFIED REGISTERED

## 2018-11-05 PROCEDURE — 37000008 ZZH ANESTHESIA TECHNICAL FEE, 1ST 30 MIN: Performed by: STUDENT IN AN ORGANIZED HEALTH CARE EDUCATION/TRAINING PROGRAM

## 2018-11-05 PROCEDURE — 84443 ASSAY THYROID STIM HORMONE: CPT | Performed by: PHYSICIAN ASSISTANT

## 2018-11-05 PROCEDURE — 00000146 ZZHCL STATISTIC GLUCOSE BY METER IP

## 2018-11-05 PROCEDURE — 25000132 ZZH RX MED GY IP 250 OP 250 PS 637: Performed by: SURGERY

## 2018-11-05 PROCEDURE — 25000128 H RX IP 250 OP 636: Performed by: ANESTHESIOLOGY

## 2018-11-05 PROCEDURE — 25000125 ZZHC RX 250: Performed by: SURGERY

## 2018-11-05 PROCEDURE — 36000053 ZZH SURGERY LEVEL 2 EA 15 ADDTL MIN - UMMC: Performed by: STUDENT IN AN ORGANIZED HEALTH CARE EDUCATION/TRAINING PROGRAM

## 2018-11-05 PROCEDURE — 40000170 ZZH STATISTIC PRE-PROCEDURE ASSESSMENT II: Performed by: STUDENT IN AN ORGANIZED HEALTH CARE EDUCATION/TRAINING PROGRAM

## 2018-11-05 PROCEDURE — 25000128 H RX IP 250 OP 636: Performed by: NURSE ANESTHETIST, CERTIFIED REGISTERED

## 2018-11-05 PROCEDURE — 25000128 H RX IP 250 OP 636: Performed by: INTERNAL MEDICINE

## 2018-11-05 PROCEDURE — 36000051 ZZH SURGERY LEVEL 2 1ST 30 MIN - UMMC: Performed by: STUDENT IN AN ORGANIZED HEALTH CARE EDUCATION/TRAINING PROGRAM

## 2018-11-05 PROCEDURE — 25000125 ZZHC RX 250: Performed by: ANESTHESIOLOGY

## 2018-11-05 PROCEDURE — 25000132 ZZH RX MED GY IP 250 OP 250 PS 637: Performed by: INTERNAL MEDICINE

## 2018-11-05 PROCEDURE — 83735 ASSAY OF MAGNESIUM: CPT | Performed by: INTERNAL MEDICINE

## 2018-11-05 PROCEDURE — 99220 ZZC INITIAL OBSERVATION CARE,LEVL III: CPT | Mod: AI | Performed by: INTERNAL MEDICINE

## 2018-11-05 RX ORDER — POTASSIUM CHLORIDE 750 MG/1
20-40 TABLET, EXTENDED RELEASE ORAL
Status: DISCONTINUED | OUTPATIENT
Start: 2018-11-05 | End: 2018-11-07 | Stop reason: HOSPADM

## 2018-11-05 RX ORDER — ACETAMINOPHEN 325 MG/1
650 TABLET ORAL EVERY 4 HOURS PRN
Status: DISCONTINUED | OUTPATIENT
Start: 2018-11-08 | End: 2018-11-05

## 2018-11-05 RX ORDER — FENTANYL CITRATE 50 UG/ML
25-50 INJECTION, SOLUTION INTRAMUSCULAR; INTRAVENOUS
Status: DISCONTINUED | OUTPATIENT
Start: 2018-11-05 | End: 2018-11-05 | Stop reason: HOSPADM

## 2018-11-05 RX ORDER — CLINDAMYCIN PHOSPHATE 900 MG/50ML
900 INJECTION, SOLUTION INTRAVENOUS SEE ADMIN INSTRUCTIONS
Status: DISCONTINUED | OUTPATIENT
Start: 2018-11-05 | End: 2018-11-05 | Stop reason: HOSPADM

## 2018-11-05 RX ORDER — ONDANSETRON 2 MG/ML
4 INJECTION INTRAMUSCULAR; INTRAVENOUS EVERY 6 HOURS PRN
Status: DISCONTINUED | OUTPATIENT
Start: 2018-11-05 | End: 2018-11-07 | Stop reason: HOSPADM

## 2018-11-05 RX ORDER — DEXAMETHASONE SODIUM PHOSPHATE 10 MG/ML
INJECTION, SOLUTION INTRAMUSCULAR; INTRAVENOUS PRN
Status: DISCONTINUED | OUTPATIENT
Start: 2018-11-05 | End: 2018-11-05

## 2018-11-05 RX ORDER — ZOLPIDEM TARTRATE 5 MG/1
10 TABLET ORAL
Status: DISCONTINUED | OUTPATIENT
Start: 2018-11-05 | End: 2018-11-07 | Stop reason: HOSPADM

## 2018-11-05 RX ORDER — DIPHENOXYLATE HCL/ATROPINE 2.5-.025MG
1 TABLET ORAL 4 TIMES DAILY PRN
Status: DISCONTINUED | OUTPATIENT
Start: 2018-11-05 | End: 2018-11-07 | Stop reason: HOSPADM

## 2018-11-05 RX ORDER — FENTANYL CITRATE 50 UG/ML
INJECTION, SOLUTION INTRAMUSCULAR; INTRAVENOUS PRN
Status: DISCONTINUED | OUTPATIENT
Start: 2018-11-05 | End: 2018-11-05

## 2018-11-05 RX ORDER — LATANOPROST 50 UG/ML
1 SOLUTION/ DROPS OPHTHALMIC AT BEDTIME
Status: DISCONTINUED | OUTPATIENT
Start: 2018-11-05 | End: 2018-11-07 | Stop reason: HOSPADM

## 2018-11-05 RX ORDER — ONDANSETRON 4 MG/1
4 TABLET, ORALLY DISINTEGRATING ORAL EVERY 6 HOURS PRN
Status: DISCONTINUED | OUTPATIENT
Start: 2018-11-05 | End: 2018-11-07 | Stop reason: HOSPADM

## 2018-11-05 RX ORDER — ALBUMIN, HUMAN INJ 5% 5 %
SOLUTION INTRAVENOUS CONTINUOUS PRN
Status: DISCONTINUED | OUTPATIENT
Start: 2018-11-05 | End: 2018-11-05

## 2018-11-05 RX ORDER — ONDANSETRON 2 MG/ML
INJECTION INTRAMUSCULAR; INTRAVENOUS PRN
Status: DISCONTINUED | OUTPATIENT
Start: 2018-11-05 | End: 2018-11-05

## 2018-11-05 RX ORDER — HYDRALAZINE HYDROCHLORIDE 20 MG/ML
10 INJECTION INTRAMUSCULAR; INTRAVENOUS EVERY 6 HOURS PRN
Status: DISCONTINUED | OUTPATIENT
Start: 2018-11-05 | End: 2018-11-07 | Stop reason: HOSPADM

## 2018-11-05 RX ORDER — POTASSIUM CL/LIDO/0.9 % NACL 10MEQ/0.1L
10 INTRAVENOUS SOLUTION, PIGGYBACK (ML) INTRAVENOUS
Status: DISCONTINUED | OUTPATIENT
Start: 2018-11-05 | End: 2018-11-07 | Stop reason: HOSPADM

## 2018-11-05 RX ORDER — LIDOCAINE 40 MG/G
CREAM TOPICAL
Status: DISCONTINUED | OUTPATIENT
Start: 2018-11-05 | End: 2018-11-06

## 2018-11-05 RX ORDER — SODIUM CHLORIDE, SODIUM LACTATE, POTASSIUM CHLORIDE, CALCIUM CHLORIDE 600; 310; 30; 20 MG/100ML; MG/100ML; MG/100ML; MG/100ML
INJECTION, SOLUTION INTRAVENOUS CONTINUOUS
Status: DISCONTINUED | OUTPATIENT
Start: 2018-11-05 | End: 2018-11-05 | Stop reason: HOSPADM

## 2018-11-05 RX ORDER — NALOXONE HYDROCHLORIDE 0.4 MG/ML
.1-.4 INJECTION, SOLUTION INTRAMUSCULAR; INTRAVENOUS; SUBCUTANEOUS
Status: DISCONTINUED | OUTPATIENT
Start: 2018-11-05 | End: 2018-11-07 | Stop reason: HOSPADM

## 2018-11-05 RX ORDER — ONDANSETRON 2 MG/ML
4 INJECTION INTRAMUSCULAR; INTRAVENOUS EVERY 30 MIN PRN
Status: DISCONTINUED | OUTPATIENT
Start: 2018-11-05 | End: 2018-11-05 | Stop reason: HOSPADM

## 2018-11-05 RX ORDER — ONDANSETRON 4 MG/1
4 TABLET, ORALLY DISINTEGRATING ORAL EVERY 30 MIN PRN
Status: DISCONTINUED | OUTPATIENT
Start: 2018-11-05 | End: 2018-11-05 | Stop reason: HOSPADM

## 2018-11-05 RX ORDER — GABAPENTIN 300 MG/1
600 CAPSULE ORAL 2 TIMES DAILY
Status: DISCONTINUED | OUTPATIENT
Start: 2018-11-05 | End: 2018-11-07 | Stop reason: HOSPADM

## 2018-11-05 RX ORDER — POTASSIUM CHLORIDE 1.5 G/1.58G
20-40 POWDER, FOR SOLUTION ORAL
Status: DISCONTINUED | OUTPATIENT
Start: 2018-11-05 | End: 2018-11-07 | Stop reason: HOSPADM

## 2018-11-05 RX ORDER — LISINOPRIL 20 MG/1
20 TABLET ORAL DAILY
Status: DISCONTINUED | OUTPATIENT
Start: 2018-11-06 | End: 2018-11-06

## 2018-11-05 RX ORDER — ACETAMINOPHEN 325 MG/1
975 TABLET ORAL EVERY 8 HOURS
Status: DISCONTINUED | OUTPATIENT
Start: 2018-11-05 | End: 2018-11-05

## 2018-11-05 RX ORDER — NALOXONE HYDROCHLORIDE 0.4 MG/ML
.1-.4 INJECTION, SOLUTION INTRAMUSCULAR; INTRAVENOUS; SUBCUTANEOUS
Status: DISCONTINUED | OUTPATIENT
Start: 2018-11-05 | End: 2018-11-05

## 2018-11-05 RX ORDER — CLINDAMYCIN PHOSPHATE 900 MG/50ML
900 INJECTION, SOLUTION INTRAVENOUS
Status: COMPLETED | OUTPATIENT
Start: 2018-11-05 | End: 2018-11-05

## 2018-11-05 RX ORDER — LIDOCAINE 40 MG/G
CREAM TOPICAL
Status: DISCONTINUED | OUTPATIENT
Start: 2018-11-05 | End: 2018-11-05 | Stop reason: HOSPADM

## 2018-11-05 RX ORDER — HEPARIN SODIUM (PORCINE) LOCK FLUSH IV SOLN 100 UNIT/ML 100 UNIT/ML
5 SOLUTION INTRAVENOUS EVERY 8 HOURS
Status: DISCONTINUED | OUTPATIENT
Start: 2018-11-05 | End: 2018-11-13 | Stop reason: HOSPADM

## 2018-11-05 RX ORDER — HYDROMORPHONE HYDROCHLORIDE 1 MG/ML
.5-1 INJECTION, SOLUTION INTRAMUSCULAR; INTRAVENOUS; SUBCUTANEOUS EVERY 4 HOURS PRN
Status: DISCONTINUED | OUTPATIENT
Start: 2018-11-05 | End: 2018-11-05

## 2018-11-05 RX ORDER — PROPOFOL 10 MG/ML
INJECTION, EMULSION INTRAVENOUS PRN
Status: DISCONTINUED | OUTPATIENT
Start: 2018-11-05 | End: 2018-11-05

## 2018-11-05 RX ORDER — LORAZEPAM 0.5 MG/1
0.5 TABLET ORAL EVERY 4 HOURS PRN
Status: DISCONTINUED | OUTPATIENT
Start: 2018-11-05 | End: 2018-11-07 | Stop reason: HOSPADM

## 2018-11-05 RX ORDER — OXYCODONE HYDROCHLORIDE 5 MG/1
5 TABLET ORAL EVERY 4 HOURS PRN
Status: DISCONTINUED | OUTPATIENT
Start: 2018-11-05 | End: 2018-11-06

## 2018-11-05 RX ORDER — MAGNESIUM SULFATE HEPTAHYDRATE 40 MG/ML
4 INJECTION, SOLUTION INTRAVENOUS EVERY 4 HOURS PRN
Status: DISCONTINUED | OUTPATIENT
Start: 2018-11-05 | End: 2018-11-07 | Stop reason: HOSPADM

## 2018-11-05 RX ORDER — LIDOCAINE HYDROCHLORIDE 20 MG/ML
INJECTION, SOLUTION INFILTRATION; PERINEURAL PRN
Status: DISCONTINUED | OUTPATIENT
Start: 2018-11-05 | End: 2018-11-05

## 2018-11-05 RX ORDER — TIMOLOL MALEATE 5 MG/ML
1 SOLUTION/ DROPS OPHTHALMIC DAILY
Status: DISCONTINUED | OUTPATIENT
Start: 2018-11-05 | End: 2018-11-07 | Stop reason: HOSPADM

## 2018-11-05 RX ORDER — POTASSIUM CHLORIDE 29.8 MG/ML
20 INJECTION INTRAVENOUS
Status: DISCONTINUED | OUTPATIENT
Start: 2018-11-05 | End: 2018-11-07 | Stop reason: HOSPADM

## 2018-11-05 RX ORDER — ACETAMINOPHEN 325 MG/1
650 TABLET ORAL EVERY 4 HOURS PRN
Status: DISCONTINUED | OUTPATIENT
Start: 2018-11-05 | End: 2018-11-06

## 2018-11-05 RX ORDER — POTASSIUM CHLORIDE 7.45 MG/ML
10 INJECTION INTRAVENOUS
Status: DISCONTINUED | OUTPATIENT
Start: 2018-11-05 | End: 2018-11-07 | Stop reason: HOSPADM

## 2018-11-05 RX ORDER — HYDROMORPHONE HYDROCHLORIDE 1 MG/ML
.3-.5 INJECTION, SOLUTION INTRAMUSCULAR; INTRAVENOUS; SUBCUTANEOUS EVERY 5 MIN PRN
Status: DISCONTINUED | OUTPATIENT
Start: 2018-11-05 | End: 2018-11-05 | Stop reason: HOSPADM

## 2018-11-05 RX ORDER — ESMOLOL HYDROCHLORIDE 10 MG/ML
INJECTION INTRAVENOUS PRN
Status: DISCONTINUED | OUTPATIENT
Start: 2018-11-05 | End: 2018-11-05

## 2018-11-05 RX ORDER — AMINO AC/PROTEIN HYDR/WHEY PRO 10G-100/30
2 LIQUID (ML) ORAL 3 TIMES DAILY
Status: DISCONTINUED | OUTPATIENT
Start: 2018-11-05 | End: 2018-11-07 | Stop reason: HOSPADM

## 2018-11-05 RX ADMIN — ONDANSETRON 4 MG: 2 INJECTION INTRAMUSCULAR; INTRAVENOUS at 14:08

## 2018-11-05 RX ADMIN — ESMOLOL HYDROCHLORIDE 20 MG: 10 INJECTION, SOLUTION INTRAVENOUS at 13:43

## 2018-11-05 RX ADMIN — Medication 100 MG: at 13:38

## 2018-11-05 RX ADMIN — DEXAMETHASONE SODIUM PHOSPHATE 8 MG: 10 INJECTION, SOLUTION INTRAMUSCULAR; INTRAVENOUS at 14:00

## 2018-11-05 RX ADMIN — ROCURONIUM BROMIDE 50 MG: 10 INJECTION INTRAVENOUS at 13:43

## 2018-11-05 RX ADMIN — FENTANYL CITRATE 100 MCG: 50 INJECTION, SOLUTION INTRAMUSCULAR; INTRAVENOUS at 13:38

## 2018-11-05 RX ADMIN — ZOLPIDEM TARTRATE 10 MG: 5 TABLET, FILM COATED ORAL at 22:56

## 2018-11-05 RX ADMIN — OMEPRAZOLE 40 MG: 20 CAPSULE, DELAYED RELEASE ORAL at 20:47

## 2018-11-05 RX ADMIN — GABAPENTIN 600 MG: 300 CAPSULE ORAL at 20:47

## 2018-11-05 RX ADMIN — LIDOCAINE HYDROCHLORIDE 60 MG: 20 INJECTION, SOLUTION INFILTRATION; PERINEURAL at 13:38

## 2018-11-05 RX ADMIN — SODIUM CHLORIDE, POTASSIUM CHLORIDE, SODIUM LACTATE AND CALCIUM CHLORIDE: 600; 310; 30; 20 INJECTION, SOLUTION INTRAVENOUS at 13:26

## 2018-11-05 RX ADMIN — ACETAMINOPHEN 975 MG: 325 TABLET ORAL at 16:57

## 2018-11-05 RX ADMIN — MIDAZOLAM 2 MG: 1 INJECTION INTRAMUSCULAR; INTRAVENOUS at 13:27

## 2018-11-05 RX ADMIN — SUGAMMADEX 200 MG: 100 INJECTION, SOLUTION INTRAVENOUS at 14:10

## 2018-11-05 RX ADMIN — HEPARIN 5 ML: 100 SYRINGE at 09:38

## 2018-11-05 RX ADMIN — OXYCODONE HYDROCHLORIDE 5 MG: 5 TABLET ORAL at 16:52

## 2018-11-05 RX ADMIN — PROPOFOL 160 MG: 10 INJECTION, EMULSION INTRAVENOUS at 13:38

## 2018-11-05 RX ADMIN — ENOXAPARIN SODIUM 40 MG: 40 INJECTION SUBCUTANEOUS at 12:28

## 2018-11-05 RX ADMIN — LORAZEPAM 0.5 MG: 0.5 TABLET ORAL at 22:56

## 2018-11-05 RX ADMIN — MULTIVITAMIN 15 ML: LIQUID ORAL at 20:48

## 2018-11-05 RX ADMIN — CLINDAMYCIN PHOSPHATE 900 MG: 18 INJECTION, SOLUTION INTRAVENOUS at 13:42

## 2018-11-05 RX ADMIN — MAGNESIUM SULFATE IN WATER 4 G: 40 INJECTION, SOLUTION INTRAVENOUS at 20:39

## 2018-11-05 RX ADMIN — ALBUMIN HUMAN: 0.05 INJECTION, SOLUTION INTRAVENOUS at 14:05

## 2018-11-05 ASSESSMENT — ENCOUNTER SYMPTOMS: DYSRHYTHMIAS: 1

## 2018-11-05 NOTE — TELEPHONE ENCOUNTER
Medication Appeal Initiation    We have initiated an appeal for the requested medication:  Medication: Ketamine 5% / Julieta 8% / Lido 2.5 % PLO Cream-PA appeal initiated  Appeal Start Date:  11/5/2018  Insurance Company: [employer] - Phone [phone] Fax [phone]  Comments:  Appeal and letter of medical necessity faxed to [employer]

## 2018-11-05 NOTE — ANESTHESIA PREPROCEDURE EVALUATION
Anesthesia Pre-Procedure Evaluation    Patient: Reuben Padilla   MRN:     [id number] Gender:   male   Age:    58 year old :      1960        Preoperative Diagnosis: Esophageal Cancer, Dehydration    Procedure(s):  Esophagogastroduodenoscopy With Esophageal Stent Placement   Percutaneous Gastrostomy Tube Placement   Possible Laparoscopic Assisted Gastrostomy Possible Jejunostomy Tube Insertion      Past Medical History:   Diagnosis Date     Arrhythmia      Cancer (H)     esophageal     Glaucoma      Hypertension      Paroxysmal A-fib (H)      Tubular adenoma 2017      Past Surgical History:   Procedure Laterality Date     AMPUTATION      toe     ARTHROSCOPY KNEE       bunion surgery       CHOLECYSTECTOMY       COLONOSCOPY  2017    remove 2 polyps     ESOPHAGOSCOPY, GASTROSCOPY, DUODENOSCOPY (EGD), COMBINED N/A 10/10/2018    Procedure: COMBINED ESOPHAGOSCOPY, GASTROSCOPY, DUODENOSCOPY (EGD);  Esophagogastroduodenoscopy ;  Surgeon: Leonel Joel MD;  Location: [facility] OR     INSERT PORT VASCULAR ACCESS Right 2017    Procedure: INSERT PORT VASCULAR ACCESS;  Single Lumen Chest Power Port;  Surgeon: Jasiel Hu PA-C;  Location:  OR          Anesthesia Evaluation     . Pt has had prior anesthetic. Type: General and MAC           ROS/MED HX    ENT/Pulmonary:     (+)[facility] risk factors hypertension, obese, , . .    Neurologic:     (+)other neuro chronic pain    Cardiovascular:     (+) ----. : . . . :. dysrhythmias a-fib, .       METS/Exercise Tolerance:     Hematologic:  - neg hematologic  ROS       Musculoskeletal:  - neg musculoskeletal ROS       GI/Hepatic:     (+) GERD Asymptomatic on medication, Other GI/Hepatic esopahgeal natty      Renal/Genitourinary:     (+) chronic renal disease, type: ARF, Pt does not require dialysis, Pt has no history of transplant,       Endo:         Psychiatric:  - neg psychiatric ROS       Infectious Disease:  - neg infectious disease ROS       Malignancy:  "  (+) Malignancy History of GI  GI CA  Active status post Chemo, Other CA Active status post         Other:                         PHYSICAL EXAM:   Mental Status/Neuro: A/A/O   Airway: Facies: Feasible  Mallampati: II  Mouth/Opening: Full  TM distance: > 6 cm  Neck ROM: Full   Respiratory: Auscultation: CTAB     Resp. Rate: Normal     Resp. Effort: Normal      CV: Rhythm: Afib   Comments:      Dental:  Dentures: Upper                  Lab Results   Component Value Date    WBC 8.2 11/05/2018    HGB 11.3 (L) 11/05/2018    HCT 35.6 (L) 11/05/2018     (L) 11/05/2018     11/05/2018    POTASSIUM 3.7 11/05/2018    CHLORIDE 110 (H) 11/05/2018    CO2 21 11/05/2018    BUN 15 11/05/2018    CR 1.00 11/05/2018    GLC 94 11/05/2018    NIDHI 8.5 11/05/2018    PHOS 2.5 11/02/2018    MAG 1.6 11/02/2018    ALBUMIN 3.3 (L) 11/05/2018    PROTTOTAL 7.0 11/05/2018    ALT 16 11/05/2018    AST 17 11/05/2018    ALKPHOS 87 11/05/2018    BILITOTAL 0.8 11/05/2018    LIPASE 75 11/01/2018    PTT 36 11/02/2018    INR 1.12 11/02/2018    FIBR 288 11/02/2018    TSH 4.80 (H) 11/05/2018    T4 1.39 11/05/2018       Preop Vitals  BP Readings from Last 3 Encounters:   11/05/18 (!) 140/99   11/04/18 145/77   11/03/18 134/86    Pulse Readings from Last 3 Encounters:   11/05/18 87   11/04/18 110   11/03/18 115      Resp Readings from Last 3 Encounters:   11/05/18 16   11/04/18 16   11/03/18 16    SpO2 Readings from Last 3 Encounters:   11/05/18 99%   11/04/18 97%   11/03/18 96%      Temp Readings from Last 1 Encounters:   11/05/18 36.6  C (97.9  F) (Oral)    Ht Readings from Last 1 Encounters:   11/05/18 1.981 m (6' 6\")      Wt Readings from Last 1 Encounters:   11/05/18 (!) 156.1 kg (344 lb 2.2 oz)    Estimated body mass index is 39.77 kg/(m^2) as calculated from the following:    Height as of this encounter: 1.981 m (6' 6\").    Weight as of this encounter: 156.1 kg (344 lb 2.2 oz).     LDA:  Port A Cath Single 05/09/17 Right Chest wall " (Active)   Access Date 11/05/18 11/5/2018  9:09 AM   Access Attempts 1 11/5/2018  9:09 AM   Gauge/Length Power noncoring 90 degree bend;20 gauge 11/5/2018  9:09 AM   Site Assessment WDL 11/5/2018  9:09 AM   Line status: Medial or Superior Lumen Blood return noted;Heparin locked 11/5/2018  9:09 AM   Extravasation? No 11/5/2018  9:09 AM   Dressing change due 11/12/18 11/5/2018  9:09 AM   Needle Change Due 11/12/18 11/5/2018  9:09 AM   Date to be Reflushed 12/05/18 11/5/2018  9:09 AM   Number of days:545       ETT (adult) (Active)   Number of days:0            Assessment:   ASA SCORE: 2    NPO Status: > 6 hours since completed Solid Foods   Documentation: H&P complete; Preop Testing complete; Consents complete   Proceeding: Proceed without further delay  Tobacco Use:  NO Active use of Tobacco/UNKNOWN Tobacco use status     Plan:   Anes. Type:  General   Pre-Induction: Midazolam IV; Acetaminophen PO   Induction:  IV (RSI); Propofol   Airway: Oral ETT   Access/Monitoring: PIV   Maintenance: Balanced   Emergence: Procedure Site   Logistics: Same Day Surgery     Postop Pain/Sedation Strategy:  Standard-Options: Opioids PRN     PONV Management:  Adult Risk Factors:, Non-Smoker, Postop Opioids  Prevention: Ondansetron; Dexamethasone     CONSENT: Direct conversation   Plan and risks discussed with: Patient   Blood Products: Consent Deferred (Minimal Blood Loss)                         Dionisio Avila MD

## 2018-11-05 NOTE — IP AVS SNAPSHOT
MRN:[id number]                      After Visit Summary   11/5/2018    Reuben Padilla    MRN: [id number]           Thank you!     Thank you for choosing [facility] for your care. Our goal is always to provide you with excellent care. Hearing back from our patients is one way we can continue to improve our services. Please take a few minutes to complete the written survey that you may receive in the mail after you visit with us. Thank you!        Patient Information     Date Of Birth          1960        About your hospital stay     You were admitted on:  November 5, 2018 You last received care in the:  [address] [facility]    You were discharged on:  November 7, 2018        Reason for your hospital stay       Admitted for PEG placement & initiation of TF                  Who to Call     For medical emergencies, please call 911.  For non-urgent questions about your medical care, please call your primary care provider or clinic, None  For questions related to your surgery, please call your surgery clinic        Attending Provider     Provider Specialty    Lyn Fortune MD Thoracic Diseases    Carlene Bryant MD Hematology & Oncology       Primary Care Provider Fax #    Physician No Ref-Primary [phone]       When to contact your care team       Please call the [facility] Triage RN Line [phone] ([facility] RN available M-F 8-5, after 5 pm the RN Advisor will page the On-Call Oncology Fellow who will return your call) for temperature greater than 100.4 F, uncontrolled nausea/vomiting/diarrhea or unrelieved constipation, pain not relieved by medications, bleeding not stopped by pressure, dizziness, chest pain, shortness of breath, changes in level of consciousness, or any other new concerning symptoms.                  After Care Instructions     Activity       Your activity upon discharge: activity as tolerated            DIETITIAN FOLLOW-UP       Patient would like to work with a  Registered Dietitian to aid in weight maintenance with enteral feeds provisions & oral intake. Patient also struggling with poor oral intake given limited swallowing ability.            Diet       Follow this diet upon discharge: Orders Placed This Encounter      Adult Formula Drip Feeding: Continuous Isosource 1.5; Gastrostomy; Goal Rate: 60 mL/hour; mL/hr;   Medication - Tube Feeding Flush Frequency: At least 15-30 mL water before and after medication administration and with tube clogging          Regular Diet Adult- for pleasure only            Tubes and drains       You are going home with the following tubes or drains: feeding tube G-Tube.   Tube cares per hospital or home care instructions                  Follow-up Appointments     Adult [facility] Follow-up and recommended labs and tests       - Please follow up next Tuesday as scheduled    Appointments on [place] and/or [facility] (with [facility] or [facility] provider or service). Call [phone] if you haven't heard regarding these appointments within 7 days of discharge.                  Your next 10 appointments already scheduled     Nov 13, 2018  9:00 AM CST   [facility] Lab Draw with [facility] LAB DRAW   [facility] Lab Draw ([facility])    [address] [place] [address]  [address]  [facility] [address]   [phone]            Nov 13, 2018  9:30 AM CST   (Arrive by 9:15 AM)   Return Visit with Loraine Sutherland PA-C   [facility] ([facility])    [address] [place] [address]  [address]  [facility] [address]   [phone]            Nov 13, 2018 12:30 PM CST   Infusion 60 with  ONCOLOGY INFUSION,  22 ATC   [facility] ([facility])    [address] [place] [address]  [address]  [facility] [address]   [phone]            Nov 20, 2018 11:00 AM CST   (Arrive by 10:45 AM)   New Patient Visit with Nieves Butcher MD   [facility]inic ([facility])    [address]  [address]  [facility] [address]   [phone]            Dec 03, 2018  8:00 AM CST   [facility] Lab Draw with [facility] DRAW   [facility] Lab Draw ([facility])    [address]  [address]  [facility] [address]   [phone]            Dec 03, 2018  8:30 AM CST   (Arrive by 8:15 AM)   Return Visit with Kadi Dee MD   [facility] ([facility])    [address] [place] [address]  [address]  [facility] [address]   [phone]            Dec 03, 2018 10:00 AM CST   Infusion 60 with UC ONCOLOGY INFUSION, UC 29 ATC   [facility] ([facility])    [address] [place] [address]  [address]  [facility] [address] [phone]              Additional Services     Home infusion referral       Your provider has referred you to: [facility]  Phone  [phone]  Fax  [phone]     Referral for enteral nutrition       Isosource 1.5 @ goal 60 ml/hr (1440 ml/day)   --2 ProSource Protein Pkt TID (total of 6 packets daily)   --160 mL water flushes every 4 hours if patient unable to meet hydration needs via PO    Home Infusion to assist patient in transitioning to bolus:  Inpatient RD recommendation: Stop continuous TF for 1-2 hrs to let stomach empty prior to starting gravity feeding. Begin 1st gravity feeding @ 125 mL x 2 feedings (separate 3-4 hrs apart). If tolerated, increase vol to 250 mL x 2 feedings and then increase to goal vol of 500 mL x 3 feedings per day.  Flush with 160 mL of H20 before and after each feeding if TF to provide full hydration +2 ProSource Protein Pkt TID (total of 6 packets daily). Do not give feedings at night while pt asleep (during the day only).     Local Address (if different from home address): N/A    Anticipated Length of Therapy: per provider orders    Home Infusion Pharmacist to  "adjust therapy based on labs and clinical assessments: Yes    Labs:  May draw labs from Venous Catheter: Yes  Home Infusion Pharmacist to order labs based on therapy type and clinical assessments: Yes  Call/Fax Lab Results to: Dr. Dee, [facility]  Fax: [phone]    Agency Staff to assess nursing needs for Infusion Therapy.                  Pending Results     Date and Time Order Name Status Description    11/5/2018 0906 T4 FREE In process             Statement of Approval     Ordered          11/07/18 1001  I have reviewed and agree with all the recommendations and orders detailed in this document.  EFFECTIVE NOW     Approved and electronically signed by:  Nieves Ly APRN CNP             Admission Information     Date & Time Provider Department Dept. Phone    11/5/2018 Carlene Bryant MD Unit [address]D [facility] [phone]      Your Vitals Were     Blood Pressure Pulse Temperature Respirations Height Weight    121/80 (BP Location: Left arm) 87 98.7  F (37.1  C) (Oral) 18 1.981 m (6' 6\") 157.5 kg (347 lb 4.8 oz)    Pulse Oximetry BMI (Body Mass Index)                96% 40.13 kg/m2          [employer] Information     [employer] gives you secure access to your electronic health record. If you see a primary care provider, you can also send messages to your care team and make appointments. If you have questions, please call your primary care clinic.  If you do not have a primary care provider, please call [phone] and they will assist you.        Care EveryWhere ID     This is your Care EveryWhere ID. This could be used by other organizations to access your [place] medical records  [id number]        Equal Access to Services     [facility]: Hadii antonio canseco Sonigel, waaxda luqadaha, qaybta kaalmada geraldo sotelo . So [facility] [phone].    ATENCIÓN: Si habla español, tiene a alamo disposición servicios gratuitos de asistencia lingüística. Llame " al [phone].    We comply with applicable federal civil rights laws and [place] laws. We do not discriminate on the basis of race, color, national origin, age, disability, sex, sexual orientation, or gender identity.               Review of your medicines      START taking        Dose / Directions    acetaminophen 325 MG tablet   Commonly known as:  TYLENOL   Used for:  Cancer of distal third of esophagus (H)        Dose:  975 mg   Take 3 tablets (975 mg) by mouth every 8 hours as needed for mild pain   Quantity:  100 tablet   Refills:  0       enoxaparin 100 MG/ML injection   Commonly known as:  LOVENOX   Used for:  Cancer of distal third of esophagus (H)   Replaces:  enoxaparin 150 MG/ML injection        Dose:  100 mg   Inject 1 mL (100 mg) Subcutaneous every 12 hours   Quantity:  42 mL   Refills:  0       oxyCODONE IR 10 MG tablet   Commonly known as:  ROXICODONE   Used for:  Cancer of distal third of esophagus (H)        Dose:  10 mg   Take 1 tablet (10 mg) by mouth every 3 hours as needed for breakthrough pain   Quantity:  14 tablet   Refills:  0       protein modular Liqd   Used for:  Cancer of distal third of esophagus (H)        Dose:  2 packet   2 packets by Per Feeding Tube route 3 times daily   Quantity:  180 packet   Refills:  0       thiamine 100 MG tablet   Used for:  Cancer of distal third of esophagus (H)        Dose:  100 mg   Start taking on:  11/8/2018   1 tablet (100 mg) by Oral or Feeding Tube route daily for 8 days   Quantity:  8 tablet   Refills:  0         CONTINUE these medicines which may have CHANGED, or have new prescriptions. If we are uncertain of the size of tablets/capsules you have at home, strength may be listed as something that might have changed.        Dose / Directions    gabapentin 300 MG capsule   Commonly known as:  NEURONTIN   This may have changed:    - how much to take  - when to take this  - additional instructions   Used for:  Chemotherapy-induced neuropathy (H)         Two tablets in the am and one tablet in the afternoon and evening   Quantity:  180 capsule   Refills:  1       LORazepam 0.5 MG tablet   Commonly known as:  ATIVAN   This may have changed:  Another medication with the same name was removed. Continue taking this medication, and follow the directions you see here.   Used for:  Cancer of distal third of esophagus (H)        Dose:  0.5 mg   Take 1 tablet (0.5 mg) by mouth every 4 hours as needed (Anxiety, Nausea/Vomiting or Sleep)   Quantity:  30 tablet   Refills:  2         CONTINUE these medicines which have NOT CHANGED        Dose / Directions    diphenoxylate-atropine 2.5-0.025 MG per tablet   Commonly known as:  LOMOTIL   Used for:  Diarrhea, unspecified type        Dose:  1 tablet   Take 1 tablet by mouth 4 times daily as needed for diarrhea   Quantity:  40 tablet   Refills:  1       fish oil-omega-3 fatty acids 1000 MG capsule        Dose:  2 g   Take 2 g by mouth daily   Refills:  0       ketamine 5% gabapentin 8% lidocaine 2.5% topical PLO cream   Used for:  Cancer of distal third of esophagus (H), Other secondary hypertension        Use small amount topically to feet three times daily   Quantity:  30 g   Refills:  3       latanoprost 0.005 % ophthalmic solution   Commonly known as:  XALATAN   Used for:  Cancer of distal third of esophagus (H)        Dose:  1 drop   Place 1 drop into both eyes At Bedtime   Quantity:  1 Bottle   Refills:  0       lidocaine-prilocaine cream   Commonly known as:  EMLA   Used for:  Cancer of distal third of esophagus (H)        Apply topically as needed for moderate pain   Quantity:  30 g   Refills:  3       metoclopramide 10 MG tablet   Commonly known as:  REGLAN   Used for:  Gastroparesis        Dose:  10 mg   Take 1 tablet (10 mg) by mouth 3 times daily   Quantity:  120 tablet   Refills:  3       Multi-vitamin Tabs tablet        Dose:  1 tablet   Take 1 tablet by mouth daily   Refills:  0       omeprazole 40 MG capsule    Commonly known as:  priLOSEC   Used for:  Cancer of distal third of esophagus (H)        Dose:  40 mg   Take 1 capsule (40 mg) by mouth daily   Quantity:  90 capsule   Refills:  1       ondansetron 8 MG tablet   Commonly known as:  ZOFRAN   Used for:  Cancer of distal third of esophagus (H)        Dose:  8 mg   Take 1 tablet (8 mg) by mouth every 8 hours as needed (Nausea/Vomiting)   Quantity:  30 tablet   Refills:  2       prochlorperazine 10 MG tablet   Commonly known as:  COMPAZINE   Used for:  Cancer of distal third of esophagus (H)        Dose:  10 mg   Take 1 tablet (10 mg) by mouth every 6 hours as needed (Nausea/Vomiting)   Quantity:  30 tablet   Refills:  2       timolol 0.5 % ophthalmic solution   Commonly known as:  TIMOPTIC   Used for:  Cancer of distal third of esophagus (H)        Place into both eyes daily   Refills:  0       zolpidem 10 MG tablet   Commonly known as:  AMBIEN   Used for:  Metastasis from gastric cancer (H)        Dose:  10 mg   Take 1 tablet (10 mg) by mouth nightly as needed   Quantity:  60 tablet   Refills:  1         STOP taking     enoxaparin 150 MG/ML injection   Commonly known as:  LOVENOX   Replaced by:  enoxaparin 100 MG/ML injection           filgrastim 480 MCG/0.8ML Sosy syringe   Commonly known as:  NEUPOGEN           lisinopril 40 MG tablet   Commonly known as:  PRINIVIL/ZESTRIL           tadalafil 5 MG tablet   Commonly known as:  CIALIS                Where to get your medicines      These medications were sent to [facility] [place], [place] - [address] [address]     Phone:  [phone]     enoxaparin 100 MG/ML injection    protein modular Liqd    thiamine 100 MG tablet         Some of these will need a paper prescription and others can be bought over the counter. Ask your nurse if you have questions.     Bring a paper prescription for each of these medications     oxyCODONE IR 10 MG tablet       You don't  need a prescription for these medications     acetaminophen 325 MG tablet                Protect others around you: Learn how to safely use, store and throw away your medicines at www.disposemymeds.org.        Information about OPIOIDS     PRESCRIPTION OPIOIDS: WHAT YOU NEED TO KNOW   We gave you an opioid (narcotic) pain medicine. It is important to manage your pain, but opioids are not always the best choice. You should first try all the other options your care team gave you. Take this medicine for as short a time (and as few doses) as possible.    Some activities can increase your pain, such as bandage changes or therapy sessions. It may help to take your pain medicine 30 to 60 minutes before these activities. Reduce your stress by getting enough sleep, working on hobbies you enjoy and practicing relaxation or meditation. Talk to your care team about ways to manage your pain beyond prescription opioids.    These medicines have risks:    DO NOT drive when on new or higher doses of pain medicine. These medicines can affect your alertness and reaction times, and you could be arrested for driving under the influence (DUI). If you need to use opioids long-term, talk to your care team about driving.    DO NOT operate heavy machinery    DO NOT do any other dangerous activities while taking these medicines.    DO NOT drink any alcohol while taking these medicines.     If the opioid prescribed includes acetaminophen, DO NOT take with any other medicines that contain acetaminophen. Read all labels carefully. Look for the word  acetaminophen  or  Tylenol.  Ask your pharmacist if you have questions or are unsure.    You can get addicted to pain medicines, especially if you have a history of addiction (chemical, alcohol or substance dependence). Talk to your care team about ways to reduce this risk.    All opioids tend to cause constipation. Drink plenty of water and eat foods that have a lot of fiber, such as fruits,  vegetables, prune juice, apple juice and high-fiber cereal. Take a laxative (Miralax, milk of magnesia, Colace, Senna) if you don t move your bowels at least every other day. Other side effects include upset stomach, sleepiness, dizziness, throwing up, tolerance (needing more of the medicine to have the same effect), physical dependence and slowed breathing.    Store your pills in a secure place, locked if possible. We will not replace any lost or stolen medicine. If you don t finish your medicine, please throw away (dispose) as directed by your pharmacist. The [place] Pollution [employer] has more information about safe disposal: https://www.pca.[facility].[place].us/living-green/managing-unwanted-medications             Medication List: This is a list of all your medications and when to take them. Check marks below indicate your daily home schedule. Keep this list as a reference.      Medications           Morning Afternoon Evening Bedtime As Needed    acetaminophen 325 MG tablet   Commonly known as:  TYLENOL   Take 3 tablets (975 mg) by mouth every 8 hours as needed for mild pain   Last time this was given:  975 mg on 11/7/2018  8:12 AM                                diphenoxylate-atropine 2.5-0.025 MG per tablet   Commonly known as:  LOMOTIL   Take 1 tablet by mouth 4 times daily as needed for diarrhea                                enoxaparin 100 MG/ML injection   Commonly known as:  LOVENOX   Inject 1 mL (100 mg) Subcutaneous every 12 hours   Last time this was given:  100 mg on 11/7/2018 10:06 AM                                fish oil-omega-3 fatty acids 1000 MG capsule   Take 2 g by mouth daily                                gabapentin 300 MG capsule   Commonly known as:  NEURONTIN   Two tablets in the am and one tablet in the afternoon and evening   Last time this was given:  600 mg on 11/7/2018  8:12 AM                                ketamine 5% gabapentin 8% lidocaine 2.5% topical PLO cream   Use small  amount topically to feet three times daily                                latanoprost 0.005 % ophthalmic solution   Commonly known as:  XALATAN   Place 1 drop into both eyes At Bedtime   Last time this was given:  1 drop on 11/6/2018 10:48 PM                                lidocaine-prilocaine cream   Commonly known as:  EMLA   Apply topically as needed for moderate pain                                LORazepam 0.5 MG tablet   Commonly known as:  ATIVAN   Take 1 tablet (0.5 mg) by mouth every 4 hours as needed (Anxiety, Nausea/Vomiting or Sleep)   Last time this was given:  0.5 mg on 11/6/2018 10:48 PM                                metoclopramide 10 MG tablet   Commonly known as:  REGLAN   Take 1 tablet (10 mg) by mouth 3 times daily   Last time this was given:  10 mg on 11/7/2018 11:48 AM                                Multi-vitamin Tabs tablet   Take 1 tablet by mouth daily                                omeprazole 40 MG capsule   Commonly known as:  priLOSEC   Take 1 capsule (40 mg) by mouth daily   Last time this was given:  40 mg on 11/7/2018  8:12 AM                                ondansetron 8 MG tablet   Commonly known as:  ZOFRAN   Take 1 tablet (8 mg) by mouth every 8 hours as needed (Nausea/Vomiting)                                oxyCODONE IR 10 MG tablet   Commonly known as:  ROXICODONE   Take 1 tablet (10 mg) by mouth every 3 hours as needed for breakthrough pain   Last time this was given:  10 mg on 11/7/2018 10:06 AM                                prochlorperazine 10 MG tablet   Commonly known as:  COMPAZINE   Take 1 tablet (10 mg) by mouth every 6 hours as needed (Nausea/Vomiting)                                protein modular Liqd   2 packets by Per Feeding Tube route 3 times daily   Last time this was given:  2 packets on 11/7/2018  8:15 AM                                thiamine 100 MG tablet   1 tablet (100 mg) by Oral or Feeding Tube route daily for 8 days   Start taking on:  11/8/2018   Last  time this was given:  100 mg on 11/7/2018  8:12 AM                                timolol 0.5 % ophthalmic solution   Commonly known as:  TIMOPTIC   Place into both eyes daily   Last time this was given:  1 drop on 11/7/2018  8:13 AM                                zolpidem 10 MG tablet   Commonly known as:  AMBIEN   Take 1 tablet (10 mg) by mouth nightly as needed   Last time this was given:  10 mg on 11/6/2018 10:48 PM

## 2018-11-05 NOTE — H&P
Heme/Onc History and Physical    Reuben Padilla MRN# [id number]   Age: 58 year old YOB: 1960     Date of Admission:  11/5/2018    Primary care provider: No Ref-Primary, Physician          Assessment and Plan:   Assessment:  Patient is a 58 year old male with metastatic esophageal adenocarcinoma with progressive disease despite multiple lines of chemo, including FOLFOX, Taxol/Cyramza, Crizotinib, and again Taxol/Cyramza recently started on palliative Keytruda on 10/23, underwent PEG tube placement today by thoracic surgery for esophageal obstruction.     He was recently admitted at [facility] on 11/1 for symptoms of esophageal obstruction. He had outpatient esophagram on 11/1 which was suggestive of dysmotility and likely intermittent obstruction. He was admitted for PEG tube placement but it could not be done as he was found to be neutropenic with ANC of 1000. To help his counts, he was started on Neupogen 480mcg daily and discharged on 11/2 with tentative plan of PEG tube placement on 11/5 if his counts had improved. He also got 2 more doses of Neupogen post discharge on 11/3 and 11/4. This morning, his ANC on outpatient check had improved to 6.2 and he was deemed safe to undergo the procedure.    He finally got PEG tube placed today by thoracic surgery and is now being admitted for overnight observation.     Plan:  #Progressive dysphagia secondary to obstruction from esophageal adenocarcinoma  #S/P PEG tube on 11/5  - Initiate tube feeds per nutritional recs dated 11/2 during his recent hospitalization.   - Ordered Isosource 1.5 TF to start at 10 mL/hr and advance by 10 mL q8h pending tolerance and electrolytes. Goal is 60 ml/hr.   - ProSource protein 2 packets per feeding tube TID.  - Certavite to supplement TF.   - 160 mL water flushes every 4 hours if patient unable to meet hydration needs via PO.  - Glucose checks q6h while on TF.   - Continue full liquid diet as tolerated.  - Consult nutrition for  repeat assessment and any new recommendations.   - PRN oxycodone PO and Zofran IV/PO for pain and nausea.     #Monitor for refeeding syndrome  At high risk of refeeding syndrome. Admission Mg borderline low but K, Phos and Ca normal.   - Initiate electrolyte replacement protocol (SCr nml).   - Repeat BMP, Mg and Phos with AM labs and at 2 PM tomorrow.       #Pancytopenia  Likely secondary to chemo. Counts are currently improved s/p multiple recent doses of Neupogen.   - Follow CBC.  - Transfuse for Hb < 7 or platelets < 10K.      #H/O PE in the setting of active malignancy  - Resume admn lovenox 130 mg BID from tomorrow AM (12 hours post-op).     #B/L LE edema  B/L LE 2-3+ pitting edema which is chronic and overall stable per patient. No acute pain, erythema or calf tenderness. Noted to have mod venous incompetence in the region of B/L superficial femoral junctions on venous reflux study dated 8/12/16.   - B/L LE doppler ultrasound to rule out DVT for risk stratification. Patient already on therapeutic anticoagulation.      #HTN  - Resume home lisinopril 20 mg daily.   - Hydralazine 10 mg IV PRN q6h for SBP > 160 or DBP > 110.     #H/O paroxysmal Afib  Irregular rhythm on admn auscultation. Rate controlled.   - Monitor VS.   - No need for tele monitoring at this time.    #Abnormal thyroid function tests  TSH borderline elevated at 4.80 on 11/5 and increased from 3.04 on 10/23. Free T4 normal on both checks. This suggests subclinical hypothyroidism.   - Check anti-TPO Ab.   - Repeat TFTs in 6-8 weeks.     #Neuropathy  Stable. Likely from chemo.  - Continue home gabapentin PO and gabapentin cream to apply in feet.       #Right shoulder pain  MRI right shoulder on 12/11/17 showed full-thickness tear in the supraspinatus, partial-thickness tear in the subscapularis and likely global labral degenerative tearing.   - No acute need of pain control as chr pain is stable.       #Other polyarthralgia   Possibly from  Keytruda.  - No acute need of pain control as chr pain is stable.     #Metastatic esophageal adenocarcinoma, liver metastases, widespread lavon metastasis  Tumor is positive for cytokeratin 20, negative for P63 and CK7, and HER2 negative. Started on FOLFOX on 5/15/17. Delay in treatment from 9/5-10/2/17 due to work related injury. He had an excellent response by imaging throughout the summer 2017, but a mixed response by imaging in late fall 2017. In January 2018, he was switched to Taxol/Cyramza with slight progression and neuropathy and clinical trial became available. In March 2018, he was started on the [facility] Match Clinical Trial with crizotinib (MET amplification). He remained on crizotinib March - July 2018, but was found to have progressive disease. He switched to Taxol/cyramza on 7/24/18 and needed treatment modification to days 1,8 every 21 days with cycle 2 due to neutropenia. With progressive disease on taxol/cyramza, he started palliative Keytruda on 10/23/2018. He has low PDL1 expression.      FEN:   - Tube feeds and full liquid diet as above.   - PRN lyte replacement per standing protocol.    Prophylaxis:   - Resume therapeutic Lovenox from tomorrow AM.   - Continue home PPI for GI/PUD ppx.    Code Status: DNR    Disposition: Likely discharge tomorrow pending tolerance to tube feeds and no suspicion of refeeding syndrome.       Fransisco Chacon MD  Hospitalist, Hematology and Oncology  11/05/2018            Chief Complaint:   S/P PEG tube placement by thoracic surgery         History of Present Illness:   Patient is a 58 year old male with metastatic esophageal adenocarcinoma with progressive disease despite multiple lines of chemo, including FOLFOX, Taxol/Cyramza, Crizotinib, and again Taxol/Cyramza recently started on palliative Keytruda on 10/23, underwent PEG tube placement today by thoracic surgery for esophageal obstruction.     He was recently admitted at [facility] on 11/1 for symptoms of esophageal  obstruction. He had outpatient esophagram on 11/1 which was suggestive of dysmotility and likely intermittent obstruction. He was admitted for PEG tube placement but it could not be done as he was found to be neutropenic with ANC of 1000. To help his counts, he was started on Neupogen 480mcg daily and discharged on 11/2 with tentative plan of PEG tube placement on 11/5 if his counts had improved. He also got 2 more doses of Neupogen post discharge on 11/3 and 11/4. This morning, his ANC on outpatient check had improved to 6.2 and he was deemed safe to undergo the procedure.    He finally got PEG tube placed today by thoracic surgery and is now being admitted for overnight observation. He is currently doing well. He denies any pain at the operative site which looks clean without any discharge. He endorses chr right shoulder pain which is stable.            Review of Systems:     14-point review of systems was otherwise negative except as noted in HPI.          Past Medical History:   Past medical history was reviewed.   Past Medical History:   Diagnosis Date     Arrhythmia      Cancer (H)     esophageal     Glaucoma      Hypertension      Paroxysmal A-fib (H)      Tubular adenoma 02/2017             Past Surgical History:   Past surgical history was reviewed.   Past Surgical History:   Procedure Laterality Date     AMPUTATION      toe     ARTHROSCOPY KNEE       bunion surgery       CHOLECYSTECTOMY       COLONOSCOPY  02/2017    remove 2 polyps     ESOPHAGOSCOPY, GASTROSCOPY, DUODENOSCOPY (EGD), COMBINED N/A 10/10/2018    Procedure: COMBINED ESOPHAGOSCOPY, GASTROSCOPY, DUODENOSCOPY (EGD);  Esophagogastroduodenoscopy ;  Surgeon: Leonel Joel MD;  Location: [facility] OR     INSERT PORT VASCULAR ACCESS Right 5/9/2017    Procedure: INSERT PORT VASCULAR ACCESS;  Single Lumen Chest Power Port;  Surgeon: Jasiel Hu PA-C;  Location:  OR             Social History:   Social history was reviewed.   Social  "History   Substance Use Topics     Smoking status: Former Smoker     Packs/day: 1.00     Years: 20.00     Types: Cigarettes     Quit date: 1/1/2006     Smokeless tobacco: Never Used     Alcohol use Yes      Comment: occasional             Family History:   Family history was reviewed.  History reviewed. No pertinent family history.          Allergies:     Allergies   Allergen Reactions     Penicillin G Other (See Comments)     Pt not sure-     Penicillins Unknown             Medications:   Medications Reviewed.   Current Facility-Administered Medications   Medication     dextrose 10 % 1,000 mL infusion     fentaNYL (PF) (SUBLIMAZE) injection 25-50 mcg     HYDROmorphone (PF) (DILAUDID) injection 0.3-0.5 mg     lactated ringers infusion     naloxone (NARCAN) injection 0.1-0.4 mg     ondansetron (ZOFRAN-ODT) ODT tab 4 mg    Or     ondansetron (ZOFRAN) injection 4 mg     prochlorperazine (COMPAZINE) injection 10 mg     protein modular (PROSOURCE TF) 2 packet     Facility-Administered Medications Ordered in Other Encounters   Medication     heparin 100 UNIT/ML injection 5 mL     sod bicarbonate-citric acid-simethicone (EZ GAS) 2.21-1.53-0.04 g packet 4 g             Physical Exam:   Vitals were reviewed.  Blood pressure 128/90, pulse 87, temperature 97.2  F (36.2  C), temperature source Axillary, resp. rate 11, height 1.981 m (6' 6\"), weight (!) 156.1 kg (344 lb 2.2 oz), SpO2 99 %.    Constitutional: awake, laying in bed, appears comfortable, in NAD  HEENT: MMM, EOM intact, sclerae clear and anicteric  Heart: Irregular  Lungs: Clear to auscultation bilaterally  Abd: Soft, non-tender, BS+, no masses appreciated  MSK: 2-3+ pitting edema B/L LE, no calf tenderness   Neuro: CN2-12 grossly intact, no lateralizing symptoms or focal neurologic deficits  Psych: Mood and affect WNL             Data:   I/O last 3 completed shifts:  In: 650 [I.V.:400]  Out: 5 [Blood:5]    ROUTINE LABS (Last four results)  CMP  Recent Labs  Lab " 11/05/18  0906 11/02/18  0905 11/01/18  1251    142 141   POTASSIUM 3.7 3.9 4.3   CHLORIDE 110* 112* 109   CO2 21 23 23   ANIONGAP 10 8 9   GLC 94 102* 92   BUN 15 16 19   CR 1.00 0.89 1.29*   GFRESTIMATED 77 88 57*   GFRESTBLACK >90 >90 69   NIDHI 8.5 8.1* 8.6   MAG  --  1.6  --    PHOS  --  2.5  --    PROTTOTAL 7.0 6.3* 7.0   ALBUMIN 3.3* 3.0* 3.5   BILITOTAL 0.8 0.9 1.5*   ALKPHOS 87 68 79   AST 17 18 21   ALT 16 16 20     CBC  Recent Labs  Lab 11/05/18 0906 11/02/18  0905 11/01/18  1251   WBC 8.2 1.9* 2.7*   RBC 3.95* 3.82* 4.18*   HGB 11.3* 10.8* 11.7*   HCT 35.6* 34.5* 37.6*   MCV 90 90 90   MCH 28.6 28.3 28.0   MCHC 31.7 31.3* 31.1*   RDW 18.0* 17.1* 17.0*   * 92* 129*     INR  Recent Labs  Lab 11/02/18  0905   INR 1.12     Arterial Blood GasNo lab results found in last 7 days.         Laura Allan(Attending)

## 2018-11-05 NOTE — BRIEF OP NOTE
[facility], [place]    Brief Operative Note    Pre-operative diagnosis: Esophageal Cancer, Dehydration   Post-operative diagnosis Same  Procedure: Procedure(s):  Esophagogastroduodenoscopy   Percutaneous Gastrostomy Tube Placement   Surgeon: Surgeon(s) and Role:     * Lyn Fortune MD - Primary     * Socorro Mcconnell MD - Assisting     * Melissa Mittal MD - Resident - Assisting  Anesthesia: General   Estimated blood loss: 2cc  Drains: Gastrostomy  Specimens: None  Findings:   GE junction tight but was easily traversed.  Complications: None.  Implants: None.    G-tube to gravity in PACU, can cap when ready to transfer to floor  Can start using gastrostomy tube immediately  Admit to heme/onc

## 2018-11-05 NOTE — ANESTHESIA POSTPROCEDURE EVALUATION
Anesthesia POST Procedure Evaluation    Patient: Reuben Padilla   MRN:     [id number] Gender:   male   Age:    58 year old :      1960        Preoperative Diagnosis: Esophageal Cancer, Dehydration    Procedure(s):  Esophagogastroduodenoscopy   Percutaneous Gastrostomy Tube Placement    Postop Comments: No value filed.       Anesthesia Type:  General    Reportable Event: NO     PAIN: Uncomplicated   Sign Out status: Comfortable, Well controlled pain     PONV: No PONV   Sign Out status:  No Nausea or Vomiting     Neuro/Psych: Uneventful perioperative course   Sign Out Status: Preoperative baseline; Age appropriate mentation     Airway/Resp.: Uneventful perioperative course   Sign Out Status: Non labored breathing, age appropriate RR; Resp. Status within EXPECTED Parameters     CV: Uneventful perioperative course   Sign Out status: Appropriate BP and perfusion indices; Appropriate HR/Rhythm     Disposition:   Sign Out in:  PACU  Disposition:  Phase II; Home  Recovery Course: Uneventful  Follow-Up: Not required           Last Anesthesia Record Vitals:  CRNA VITALS  2018 1348 - 2018 1448      2018             Resp Rate (set): 10          Last PACU/Preop Vitals:  Vitals:    18 1131   BP: (!) 140/99   Pulse: 87   Resp: 16   Temp: 36.6  C (97.9  F)   SpO2: 99%         Electronically Signed By: Dionisio Avila MD, 2018, 2:48 PM

## 2018-11-05 NOTE — IP AVS SNAPSHOT
[address] [address]    Phone:  [phone]                                       After Visit Summary   11/5/2018    Reuben Padilla    MRN: [id number]           After Visit Summary Signature Page     I have received my discharge instructions, and my questions have been answered. I have discussed any challenges I see with this plan with the nurse or doctor.    ..........................................................................................................................................  Patient/Patient Representative Signature      ..........................................................................................................................................  Patient Representative Print Name and Relationship to Patient    ..................................................               ................................................  Date                                   Time    ..........................................................................................................................................  Reviewed by Signature/Title    ...................................................              ..............................................  Date                                               Time          22EPIC Rev 08/18

## 2018-11-05 NOTE — TELEPHONE ENCOUNTER
MEDICATION APPEAL DENIED    Medication: Ketamine 5% / Julieta 8% / Lido 2.5 % PLO Cream-PA appeal denied    Denial Date:  11/5/18    Denial Rational:       Second Level Appeal Information:         Second level appeals will be managed by the clinic staff and provider. Please contact the [employer] Prior Authorization Team if additional information about the denial is needed.

## 2018-11-05 NOTE — PROGRESS NOTES
"Post Hospital discharge call    Spoke to Levi who had his labs drawn this morning to determine if he is eligible to have surgery today.  His labs are adequate to have surgery today, he is already on the way to the hospital.  He states that he is \"fine\" with no additional questions or concerns.  "

## 2018-11-05 NOTE — IP AVS SNAPSHOT
"    UNIT [address]D [facility]: [phone]                                              INTERAGENCY TRANSFER FORM - PHYSICIAN ORDERS   2018                    Hospital Admission Date: 2018  KORI CHANEY   : 1960  Sex: Male        Attending Provider: Carlene Bryant MD     Allergies:  Penicillin G, Penicillins    Infection:  None   Service:  THORACIC BETH    Ht:  1.981 m (6' 6\")   Wt:  157.5 kg (347 lb 4.8 oz)   Admission Wt:  156.1 kg (344 lb 2.2 oz)    BMI:  40.13 kg/m 2   BSA:  2.94 m 2            Patient PCP Information     Provider PCP Type    Physician No Ref-Primary General      ED Clinical Impression     Diagnosis Description Comment Added By Time Added    Cancer of distal third of esophagus (H) [C15.5] Cancer of distal third of esophagus (H) [C15.5]  Roxie Friend RN 2018  8:32 AM    G tube feedings (H) [Z93.1] G tube feedings (H) [Z93.1]  Trinh Hinton RD 2018 10:06 AM    Severe protein-calorie malnutrition (H) [E43] Severe protein-calorie malnutrition (H) [E43]  Trinh Hinton RD 2018 10:07 AM    Hx of pulmonary embolus [Z86.711] Hx of pulmonary embolus [Z86.711]  Nieves Ly, PADMA CNP 2018  9:54 AM      Hospital Problems as of 2018              Priority Class Noted POA    Malnutrition (H) Medium  2018 Yes    G tube feedings (H) Medium  2018 Yes      Non-Hospital Problems as of 2018              Priority Class Noted    Cancer of distal third of esophagus (H) Medium  2017    Acute pulmonary embolism (H) Medium  7/10/2017    Morbid obesity (H) Medium  2017    Tear of right supraspinatus tendon Medium  2017    Examination of participant in clinical trial Medium  3/9/2018    Esophageal obstruction Medium  2018    Paroxysmal A-fib (H) Medium  2018    Orthostatic hypotension Medium  2018    Chemotherapy-induced neutropenia (H) Medium  2018      Code Status History     Date Active Date " Inactive Code Status Order ID Comments User Context    11/7/2018  9:58 AM  Full Code [id number]  Nieves Ly PADMA Vasquez CNP Outpatient    11/5/2018  7:00 PM 11/7/2018  9:58 AM DNR [id number]  Fransisco Chacon MD Inpatient    11/5/2018  6:16 PM 11/5/2018  7:00 PM DNR [id number]  Melissa Mittal MD Inpatient    11/2/2018  4:18 PM 11/5/2018  6:16 PM DNR [id number]  Radha Camejo PA-C Outpatient    11/1/2018  6:12 PM 11/2/2018  4:18 PM DNR [id number]  Beltran Garcia MD Inpatient    11/1/2018  5:27 PM 11/1/2018  6:12 PM Full Code [id number]  Beltran Garcia MD Inpatient         Medication Review      START taking        Dose / Directions Comments    acetaminophen 325 MG tablet   Commonly known as:  TYLENOL   Used for:  Cancer of distal third of esophagus (H)        Dose:  975 mg   Take 3 tablets (975 mg) by mouth every 8 hours as needed for mild pain   Quantity:  100 tablet   Refills:  0        enoxaparin 100 MG/ML injection   Commonly known as:  LOVENOX   Used for:  Cancer of distal third of esophagus (H)   Replaces:  enoxaparin 150 MG/ML injection        Dose:  100 mg   Inject 1 mL (100 mg) Subcutaneous every 12 hours   Quantity:  42 mL   Refills:  0        oxyCODONE IR 10 MG tablet   Commonly known as:  ROXICODONE   Used for:  Cancer of distal third of esophagus (H)        Dose:  10 mg   Take 1 tablet (10 mg) by mouth every 3 hours as needed for breakthrough pain   Quantity:  14 tablet   Refills:  0        protein modular Liqd   Used for:  Cancer of distal third of esophagus (H)        Dose:  2 packet   2 packets by Per Feeding Tube route 3 times daily   Quantity:  180 packet   Refills:  0        thiamine 100 MG tablet   Used for:  Cancer of distal third of esophagus (H)        Dose:  100 mg   Start taking on:  11/8/2018   1 tablet (100 mg) by Oral or Feeding Tube route daily for 8 days   Quantity:  8 tablet   Refills:  0          CONTINUE these medications which may have CHANGED, or have new  prescriptions. If we are uncertain of the size of tablets/capsules you have at home, strength may be listed as something that might have changed.        Dose / Directions Comments    gabapentin 300 MG capsule   Commonly known as:  NEURONTIN   This may have changed:    - how much to take  - when to take this  - additional instructions   Used for:  Chemotherapy-induced neuropathy (H)        Two tablets in the am and one tablet in the afternoon and evening   Quantity:  180 capsule   Refills:  1        LORazepam 0.5 MG tablet   Commonly known as:  ATIVAN   This may have changed:  Another medication with the same name was removed. Continue taking this medication, and follow the directions you see here.   Used for:  Cancer of distal third of esophagus (H)        Dose:  0.5 mg   Take 1 tablet (0.5 mg) by mouth every 4 hours as needed (Anxiety, Nausea/Vomiting or Sleep)   Quantity:  30 tablet   Refills:  2          CONTINUE these medications which have NOT CHANGED        Dose / Directions Comments    diphenoxylate-atropine 2.5-0.025 MG per tablet   Commonly known as:  LOMOTIL   Used for:  Diarrhea, unspecified type        Dose:  1 tablet   Take 1 tablet by mouth 4 times daily as needed for diarrhea   Quantity:  40 tablet   Refills:  1        fish oil-omega-3 fatty acids 1000 MG capsule        Dose:  2 g   Take 2 g by mouth daily   Refills:  0        ketamine 5% gabapentin 8% lidocaine 2.5% topical PLO cream   Used for:  Cancer of distal third of esophagus (H), Other secondary hypertension        Use small amount topically to feet three times daily   Quantity:  30 g   Refills:  3    Pt would like this mailed to his house       latanoprost 0.005 % ophthalmic solution   Commonly known as:  XALATAN   Used for:  Cancer of distal third of esophagus (H)        Dose:  1 drop   Place 1 drop into both eyes At Bedtime   Quantity:  1 Bottle   Refills:  0        lidocaine-prilocaine cream   Commonly known as:  EMLA   Used for:  Cancer  of distal third of esophagus (H)        Apply topically as needed for moderate pain   Quantity:  30 g   Refills:  3        metoclopramide 10 MG tablet   Commonly known as:  REGLAN   Used for:  Gastroparesis        Dose:  10 mg   Take 1 tablet (10 mg) by mouth 3 times daily   Quantity:  120 tablet   Refills:  3        Multi-vitamin Tabs tablet        Dose:  1 tablet   Take 1 tablet by mouth daily   Refills:  0        omeprazole 40 MG capsule   Commonly known as:  priLOSEC   Used for:  Cancer of distal third of esophagus (H)        Dose:  40 mg   Take 1 capsule (40 mg) by mouth daily   Quantity:  90 capsule   Refills:  1        ondansetron 8 MG tablet   Commonly known as:  ZOFRAN   Used for:  Cancer of distal third of esophagus (H)        Dose:  8 mg   Take 1 tablet (8 mg) by mouth every 8 hours as needed (Nausea/Vomiting)   Quantity:  30 tablet   Refills:  2        prochlorperazine 10 MG tablet   Commonly known as:  COMPAZINE   Used for:  Cancer of distal third of esophagus (H)        Dose:  10 mg   Take 1 tablet (10 mg) by mouth every 6 hours as needed (Nausea/Vomiting)   Quantity:  30 tablet   Refills:  2        timolol 0.5 % ophthalmic solution   Commonly known as:  TIMOPTIC   Used for:  Cancer of distal third of esophagus (H)        Place into both eyes daily   Refills:  0        zolpidem 10 MG tablet   Commonly known as:  AMBIEN   Used for:  Metastasis from gastric cancer (H)        Dose:  10 mg   Take 1 tablet (10 mg) by mouth nightly as needed   Quantity:  60 tablet   Refills:  1    Called in to [facility]         STOP taking     enoxaparin 150 MG/ML injection   Commonly known as:  LOVENOX   Replaced by:  enoxaparin 100 MG/ML injection           filgrastim 480 MCG/0.8ML Sosy syringe   Commonly known as:  NEUPOGEN           lisinopril 40 MG tablet   Commonly known as:  PRINIVIL/ZESTRIL           tadalafil 5 MG tablet   Commonly known as:  CIALIS                   Summary of Visit     Reason for your hospital stay        Admitted for PEG placement & initiation of TF             After Care     Activity       Your activity upon discharge: activity as tolerated       DIETITIAN FOLLOW-UP       Patient would like to work with a Registered Dietitian to aid in weight maintenance with enteral feeds provisions & oral intake. Patient also struggling with poor oral intake given limited swallowing ability.       Diet       Follow this diet upon discharge: Orders Placed This Encounter      Adult Formula Drip Feeding: Continuous Isosource 1.5; Gastrostomy; Goal Rate: 60 mL/hour; mL/hr; Medication - Tube Feeding Flush Frequency: At least 15-30 mL water before and after medication administration and with tube clogging; Amount to Send (...      Regular Diet Adult       Tubes and drains       You are going home with the following tubes or drains: feeding tube G-Tube.   Tube cares per hospital or home care instructions    (s/p - G tube & TF class)    Stephanie Casas, RN Registered Nurse Signed Patient Learning Plan of Care  Date of Service: 11/6/2018  3:55 PM Creation Time: 11/6/2018  3:55 PM         Problem: Patient Care Overview  Goal: Discharge Needs Assessment  11/06/18 0288     Stephanie Casas-Registered Nurse (Nursing)  Patient and brother attended feeding tube class in [facility]. Pt. RD correctly on model with all cares including site cares, flushing, anchoring tube, bolus feeds and use of Rod feeding pump and backpack. Received written material related to all content taught.Pt. Asked many relevant questions. Answered all teach-back questions appropriately. States he hopes to do bolus feedings once he can tolerate it instead of pump. States he understands all information presented.                 Referrals     Home infusion referral       Your provider has referred you to: [facility]  Phone  [phone]  Fax  [phone]     Referral for enteral nutrition     Local Address (if different from home address):  N/A    Anticipated Length of Therapy: per provider orders    Home Infusion Pharmacist to adjust therapy based on labs and clinical assessments: Yes    Labs:  May draw labs from Venous Catheter: Yes  Home Infusion Pharmacist to order labs based on therapy type and clinical assessments: Yes  Call/Fax Lab Results to: Dr. Dee, [facility]  Fax: [phone]    Agency Staff to assess nursing needs for Infusion Therapy.             Your next 10 appointments already scheduled     Nov 13, 2018  9:00 AM CST   [facility] Lab Draw with  [facility] DRAW   [facility] ([facility])    [address] [place] [address]  [address]  [facility] [address]   [phone]            Nov 13, 2018  9:30 AM CST   (Arrive by 9:15 AM)   Return Visit with Loraine Sutherland PA-C   [facility] ([facility])    [address] [place] [address]  [address]  [facility] [address]   [phone]            Nov 13, 2018 12:30 PM CST   Infusion 60 with UC ONCOLOGY INFUSION, UC 22 ATC   [facility] ([facility])    [address] [place] [address]  [address]  [facility] [address]   [phone]            Nov 20, 2018 11:00 AM CST   (Arrive by 10:45 AM)   New Patient Visit with Nieves Butcher MD   [facility] ([facility])    [address]  [address]  [facility] [address]   [phone]            Dec 03, 2018  8:00 AM CST   [facility] Lab Draw with  [facility] LAB DRAW   [facility] Draw ([facility])    [address] [place] [address]  [address]  [facility] [address]   [phone]            Dec 03, 2018  8:30 AM CST   (Arrive by 8:15 AM)   Return Visit with Kadi Dee MD   [facility] ([facility])    [address] [place] [address]  [address]  [facility] [address]   [phone]            Dec 03, 2018 10:00  AM CST   Infusion 60 with  ONCOLOGY INFUSION, UC 29 [facility] ([facility])    [address]  [address]  [facility] [address] [phone]              Follow-Up Appointment Instructions     Future Labs/Procedures    Adult [facility] Follow-up and recommended labs and tests     Comments:    - Please follow up next Tuesday as scheduled    Appointments on [place] and/or [facility] (with [facility] or [facility] provider or service). Call [phone] if you haven't heard regarding these appointments within 7 days of discharge.      Follow-Up Appointment Instructions     Adult [facility] Follow-up and recommended labs and tests       - Please follow up next Tuesday as scheduled    Appointments on [place] and/or [facility] (with [facility] or [facility] provider or service). Call [phone] if you haven't heard regarding these appointments within 7 days of discharge.             Statement of Approval     Ordered          11/07/18 1001  I have reviewed and agree with all the recommendations and orders detailed in this document.  EFFECTIVE NOW     Approved and electronically signed by:  Nieves Ly APRN CNP

## 2018-11-05 NOTE — ANESTHESIA CARE TRANSFER NOTE
Patient: Reuben Padilla    Procedure(s):  Esophagogastroduodenoscopy   Percutaneous Gastrostomy Tube Placement     Diagnosis: Esophageal Cancer, Dehydration   Diagnosis Additional Information: No value filed.    Anesthesia Type:   No value filed.     Note:  Airway :Nasal Cannula  Patient transferred to:PACU  Comments: Pt. Pink and breathing spontaneously.  Vitals stable.  Report given to oncoming nurse.  Transfer of care occurred.Handoff Report: Identifed the Patient, Identified the Reponsible Provider, Reviewed the pertinent medical history, Discussed the surgical course, Reviewed Intra-OP anesthesia mangement and issues during anesthesia, Set expectations for post-procedure period and Allowed opportunity for questions and acknowledgement of understanding      Vitals: (Last set prior to Anesthesia Care Transfer)    CRNA VITALS  11/5/2018 1348 - 11/5/2018 1437      11/5/2018             Resp Rate (set): 10                Electronically Signed By: PADMA León CRNA  November 5, 2018  2:37 PM

## 2018-11-05 NOTE — PROGRESS NOTES
Care Coordinator Progress Note    Admission Date/Time:  11/5/2018  Attending MD:  Carlene Bryant MD    Data  Chart reviewed, discussed with interdisciplinary team.   Patient was admitted for: Data Unavailable.    Concerns with insurance coverage for discharge needs: None.  Current Living Situation: Patient lives alone.  Support System: Supportive and Involved  Services Involved: none prior to admission  Transportation at Discharge: Family or friend will provide  Transportation to Medical Appointments:   - Not applicable  Barriers to Discharge: initiation of tube feedings; medical clearance    Coordination of Care and Referrals: RNCC familiar with patient from admission on 11/2/18. We had discussed home infusion referral for enteral nutrition and benefit check had been initiated through [facility] based on patient's choice. Per [facility], patient has met his deductible and OOP max so he will be covered at 100% for duration of year. They were unable to reach him via phone with this update.  Writer did call PLC and patient is added to schedule for 11/6 for G-tube and tube feeding education.  Per Heme/Onc team, anticipating discharge as early as tomorrow if patient is tolerating tube feedings, has safe discharge plan, and has no symptoms of refeeding syndrome.        Assessment  Patient with esophageal cancer, who was admitted at end of last week and known to this RNCC, now being admitted s/p G tube placement for initiation of tube feedings. [facility] office aware of pending admission.    Inpatient Case Management team to continue to follow plan of care and assist in discharge planning as appropriate based on patient care unit.        Roxie Friend  7D Heme/Onc RN Care Coordinator  Pager [phone]

## 2018-11-06 LAB
ANION GAP SERPL CALCULATED.3IONS-SCNC: 7 MMOL/L (ref 3–14)
ANION GAP SERPL CALCULATED.3IONS-SCNC: 8 MMOL/L (ref 3–14)
ANION GAP SERPL CALCULATED.3IONS-SCNC: 8 MMOL/L (ref 3–14)
BASOPHILS # BLD AUTO: 0 10E9/L (ref 0–0.2)
BASOPHILS NFR BLD AUTO: 0.2 %
BUN SERPL-MCNC: 12 MG/DL (ref 7–30)
BUN SERPL-MCNC: 16 MG/DL (ref 7–30)
BUN SERPL-MCNC: 20 MG/DL (ref 7–30)
CALCIUM SERPL-MCNC: 8.2 MG/DL (ref 8.5–10.1)
CALCIUM SERPL-MCNC: 8.3 MG/DL (ref 8.5–10.1)
CALCIUM SERPL-MCNC: 8.5 MG/DL (ref 8.5–10.1)
CHLORIDE SERPL-SCNC: 109 MMOL/L (ref 94–109)
CHLORIDE SERPL-SCNC: 110 MMOL/L (ref 94–109)
CHLORIDE SERPL-SCNC: 111 MMOL/L (ref 94–109)
CO2 SERPL-SCNC: 22 MMOL/L (ref 20–32)
CO2 SERPL-SCNC: 23 MMOL/L (ref 20–32)
CO2 SERPL-SCNC: 24 MMOL/L (ref 20–32)
CREAT SERPL-MCNC: 0.86 MG/DL (ref 0.66–1.25)
CREAT SERPL-MCNC: 1.06 MG/DL (ref 0.66–1.25)
CREAT SERPL-MCNC: 1.14 MG/DL (ref 0.66–1.25)
DIFFERENTIAL METHOD BLD: ABNORMAL
EOSINOPHIL # BLD AUTO: 0 10E9/L (ref 0–0.7)
EOSINOPHIL NFR BLD AUTO: 0 %
ERYTHROCYTE [DISTWIDTH] IN BLOOD BY AUTOMATED COUNT: 17.9 % (ref 10–15)
GFR SERPL CREATININE-BSD FRML MDRD: 66 ML/MIN/1.7M2
GFR SERPL CREATININE-BSD FRML MDRD: 72 ML/MIN/1.7M2
GFR SERPL CREATININE-BSD FRML MDRD: >90 ML/MIN/1.7M2
GLUCOSE SERPL-MCNC: 110 MG/DL (ref 70–99)
GLUCOSE SERPL-MCNC: 111 MG/DL (ref 70–99)
GLUCOSE SERPL-MCNC: 118 MG/DL (ref 70–99)
GLUCOSE SERPL-MCNC: 122 MG/DL (ref 70–99)
HCT VFR BLD AUTO: 32.9 % (ref 40–53)
HGB BLD-MCNC: 10.5 G/DL (ref 13.3–17.7)
IMM GRANULOCYTES # BLD: 0 10E9/L (ref 0–0.4)
IMM GRANULOCYTES NFR BLD: 0.6 %
LMWH PPP CHRO-ACNC: 1.11 IU/ML
LYMPHOCYTES # BLD AUTO: 0.9 10E9/L (ref 0.8–5.3)
LYMPHOCYTES NFR BLD AUTO: 13.1 %
MAGNESIUM SERPL-MCNC: 2 MG/DL (ref 1.6–2.3)
MAGNESIUM SERPL-MCNC: 2.1 MG/DL (ref 1.6–2.3)
MAGNESIUM SERPL-MCNC: 2.2 MG/DL (ref 1.6–2.3)
MAGNESIUM SERPL-MCNC: 3.2 MG/DL (ref 1.6–2.3)
MCH RBC QN AUTO: 28.8 PG (ref 26.5–33)
MCHC RBC AUTO-ENTMCNC: 31.9 G/DL (ref 31.5–36.5)
MCV RBC AUTO: 90 FL (ref 78–100)
MONOCYTES # BLD AUTO: 0.5 10E9/L (ref 0–1.3)
MONOCYTES NFR BLD AUTO: 7 %
NEUTROPHILS # BLD AUTO: 5.2 10E9/L (ref 1.6–8.3)
NEUTROPHILS NFR BLD AUTO: 79.1 %
NRBC # BLD AUTO: 0 10*3/UL
NRBC BLD AUTO-RTO: 0 /100
PHOSPHATE SERPL-MCNC: 3.3 MG/DL (ref 2.5–4.5)
PHOSPHATE SERPL-MCNC: 3.7 MG/DL (ref 2.5–4.5)
PHOSPHATE SERPL-MCNC: 3.9 MG/DL (ref 2.5–4.5)
PLATELET # BLD AUTO: 102 10E9/L (ref 150–450)
POTASSIUM SERPL-SCNC: 3.9 MMOL/L (ref 3.4–5.3)
RBC # BLD AUTO: 3.65 10E12/L (ref 4.4–5.9)
SODIUM SERPL-SCNC: 139 MMOL/L (ref 133–144)
SODIUM SERPL-SCNC: 141 MMOL/L (ref 133–144)
SODIUM SERPL-SCNC: 142 MMOL/L (ref 133–144)
THYROPEROXIDASE AB SERPL-ACNC: <10 IU/ML
WBC # BLD AUTO: 6.6 10E9/L (ref 4–11)

## 2018-11-06 PROCEDURE — 36592 COLLECT BLOOD FROM PICC: CPT | Performed by: STUDENT IN AN ORGANIZED HEALTH CARE EDUCATION/TRAINING PROGRAM

## 2018-11-06 PROCEDURE — 25000132 ZZH RX MED GY IP 250 OP 250 PS 637: Performed by: INTERNAL MEDICINE

## 2018-11-06 PROCEDURE — 25000131 ZZH RX MED GY IP 250 OP 636 PS 637: Performed by: SURGERY

## 2018-11-06 PROCEDURE — 86376 MICROSOMAL ANTIBODY EACH: CPT | Performed by: STUDENT IN AN ORGANIZED HEALTH CARE EDUCATION/TRAINING PROGRAM

## 2018-11-06 PROCEDURE — G0378 HOSPITAL OBSERVATION PER HR: HCPCS

## 2018-11-06 PROCEDURE — 85025 COMPLETE CBC W/AUTO DIFF WBC: CPT | Performed by: STUDENT IN AN ORGANIZED HEALTH CARE EDUCATION/TRAINING PROGRAM

## 2018-11-06 PROCEDURE — 25000132 ZZH RX MED GY IP 250 OP 250 PS 637: Performed by: SURGERY

## 2018-11-06 PROCEDURE — 83735 ASSAY OF MAGNESIUM: CPT | Performed by: STUDENT IN AN ORGANIZED HEALTH CARE EDUCATION/TRAINING PROGRAM

## 2018-11-06 PROCEDURE — 83735 ASSAY OF MAGNESIUM: CPT | Performed by: NURSE PRACTITIONER

## 2018-11-06 PROCEDURE — 25000128 H RX IP 250 OP 636: Performed by: INTERNAL MEDICINE

## 2018-11-06 PROCEDURE — 84100 ASSAY OF PHOSPHORUS: CPT | Performed by: NURSE PRACTITIONER

## 2018-11-06 PROCEDURE — 80048 BASIC METABOLIC PNL TOTAL CA: CPT | Performed by: STUDENT IN AN ORGANIZED HEALTH CARE EDUCATION/TRAINING PROGRAM

## 2018-11-06 PROCEDURE — 83735 ASSAY OF MAGNESIUM: CPT | Mod: 91 | Performed by: INTERNAL MEDICINE

## 2018-11-06 PROCEDURE — 80048 BASIC METABOLIC PNL TOTAL CA: CPT | Performed by: NURSE PRACTITIONER

## 2018-11-06 PROCEDURE — 85520 HEPARIN ASSAY: CPT | Performed by: NURSE PRACTITIONER

## 2018-11-06 PROCEDURE — 36592 COLLECT BLOOD FROM PICC: CPT | Performed by: NURSE PRACTITIONER

## 2018-11-06 PROCEDURE — 25000125 ZZHC RX 250: Performed by: SURGERY

## 2018-11-06 PROCEDURE — 27210429 ZZH NUTRITION PRODUCT INTERMEDIATE LITER

## 2018-11-06 PROCEDURE — 99225 ZZC SUBSEQUENT OBSERVATION CARE,LEVEL II: CPT | Performed by: INTERNAL MEDICINE

## 2018-11-06 PROCEDURE — 84100 ASSAY OF PHOSPHORUS: CPT | Performed by: STUDENT IN AN ORGANIZED HEALTH CARE EDUCATION/TRAINING PROGRAM

## 2018-11-06 PROCEDURE — 96375 TX/PRO/DX INJ NEW DRUG ADDON: CPT

## 2018-11-06 PROCEDURE — 25000128 H RX IP 250 OP 636: Performed by: NURSE PRACTITIONER

## 2018-11-06 PROCEDURE — 84100 ASSAY OF PHOSPHORUS: CPT | Performed by: INTERNAL MEDICINE

## 2018-11-06 PROCEDURE — 96372 THER/PROPH/DIAG INJ SC/IM: CPT

## 2018-11-06 PROCEDURE — 82947 ASSAY GLUCOSE BLOOD QUANT: CPT | Performed by: INTERNAL MEDICINE

## 2018-11-06 PROCEDURE — 25000132 ZZH RX MED GY IP 250 OP 250 PS 637: Performed by: NURSE PRACTITIONER

## 2018-11-06 PROCEDURE — 36592 COLLECT BLOOD FROM PICC: CPT | Performed by: INTERNAL MEDICINE

## 2018-11-06 RX ORDER — HEPARIN SODIUM,PORCINE 10 UNIT/ML
5-10 VIAL (ML) INTRAVENOUS
Status: DISCONTINUED | OUTPATIENT
Start: 2018-11-06 | End: 2018-11-07 | Stop reason: HOSPADM

## 2018-11-06 RX ORDER — METOCLOPRAMIDE 10 MG/1
10 TABLET ORAL
Status: DISCONTINUED | OUTPATIENT
Start: 2018-11-06 | End: 2018-11-07 | Stop reason: HOSPADM

## 2018-11-06 RX ORDER — AMINO AC/PROTEIN HYDR/WHEY PRO 10G-100/30
2 LIQUID (ML) ORAL 3 TIMES DAILY
Status: DISCONTINUED | OUTPATIENT
Start: 2018-11-06 | End: 2018-11-06

## 2018-11-06 RX ORDER — LISINOPRIL 10 MG/1
10 TABLET ORAL DAILY
Status: DISCONTINUED | OUTPATIENT
Start: 2018-11-06 | End: 2018-11-07 | Stop reason: HOSPADM

## 2018-11-06 RX ORDER — METOCLOPRAMIDE HYDROCHLORIDE 5 MG/5ML
10 SOLUTION ORAL
Status: DISCONTINUED | OUTPATIENT
Start: 2018-11-06 | End: 2018-11-06

## 2018-11-06 RX ORDER — KETOROLAC TROMETHAMINE 30 MG/ML
30 INJECTION, SOLUTION INTRAMUSCULAR; INTRAVENOUS ONCE
Status: COMPLETED | OUTPATIENT
Start: 2018-11-06 | End: 2018-11-06

## 2018-11-06 RX ORDER — ACETAMINOPHEN 325 MG/1
975 TABLET ORAL EVERY 8 HOURS PRN
Status: DISCONTINUED | OUTPATIENT
Start: 2018-11-06 | End: 2018-11-07 | Stop reason: HOSPADM

## 2018-11-06 RX ORDER — HEPARIN SODIUM,PORCINE 10 UNIT/ML
5-10 VIAL (ML) INTRAVENOUS EVERY 24 HOURS
Status: DISCONTINUED | OUTPATIENT
Start: 2018-11-06 | End: 2018-11-07 | Stop reason: HOSPADM

## 2018-11-06 RX ORDER — OXYCODONE HYDROCHLORIDE 10 MG/1
10 TABLET ORAL
Status: DISCONTINUED | OUTPATIENT
Start: 2018-11-06 | End: 2018-11-07 | Stop reason: HOSPADM

## 2018-11-06 RX ORDER — LISINOPRIL 10 MG/1
10 TABLET ORAL DAILY
Status: DISCONTINUED | OUTPATIENT
Start: 2018-11-07 | End: 2018-11-06

## 2018-11-06 RX ORDER — HEPARIN SODIUM (PORCINE) LOCK FLUSH IV SOLN 100 UNIT/ML 100 UNIT/ML
5 SOLUTION INTRAVENOUS
Status: DISCONTINUED | OUTPATIENT
Start: 2018-11-06 | End: 2018-11-07 | Stop reason: HOSPADM

## 2018-11-06 RX ADMIN — Medication 2 PACKET: at 08:40

## 2018-11-06 RX ADMIN — METOCLOPRAMIDE HYDROCHLORIDE 10 MG: 10 TABLET ORAL at 10:01

## 2018-11-06 RX ADMIN — KETOROLAC TROMETHAMINE 30 MG: 30 INJECTION, SOLUTION INTRAMUSCULAR at 08:36

## 2018-11-06 RX ADMIN — LISINOPRIL 10 MG: 10 TABLET ORAL at 10:01

## 2018-11-06 RX ADMIN — Medication 5 ML: at 10:02

## 2018-11-06 RX ADMIN — Medication 5 ML: at 12:44

## 2018-11-06 RX ADMIN — METOCLOPRAMIDE HYDROCHLORIDE 10 MG: 10 TABLET ORAL at 14:01

## 2018-11-06 RX ADMIN — ZOLPIDEM TARTRATE 10 MG: 5 TABLET, FILM COATED ORAL at 22:48

## 2018-11-06 RX ADMIN — OXYCODONE HYDROCHLORIDE 5 MG: 5 TABLET ORAL at 01:09

## 2018-11-06 RX ADMIN — OMEPRAZOLE 40 MG: 20 CAPSULE, DELAYED RELEASE ORAL at 08:40

## 2018-11-06 RX ADMIN — OXYCODONE HYDROCHLORIDE 10 MG: 10 TABLET ORAL at 14:09

## 2018-11-06 RX ADMIN — METOCLOPRAMIDE HYDROCHLORIDE 10 MG: 10 TABLET ORAL at 18:00

## 2018-11-06 RX ADMIN — OXYCODONE HYDROCHLORIDE 10 MG: 10 TABLET ORAL at 08:36

## 2018-11-06 RX ADMIN — ACETAMINOPHEN 975 MG: 325 TABLET, FILM COATED ORAL at 08:36

## 2018-11-06 RX ADMIN — MULTIVITAMIN 15 ML: LIQUID ORAL at 08:38

## 2018-11-06 RX ADMIN — LATANOPROST 1 DROP: 50 SOLUTION OPHTHALMIC at 22:48

## 2018-11-06 RX ADMIN — ACETAMINOPHEN 650 MG: 325 TABLET, FILM COATED ORAL at 04:38

## 2018-11-06 RX ADMIN — ONDANSETRON 4 MG: 4 TABLET, ORALLY DISINTEGRATING ORAL at 18:21

## 2018-11-06 RX ADMIN — ENOXAPARIN SODIUM 130 MG: 150 INJECTION SUBCUTANEOUS at 08:38

## 2018-11-06 RX ADMIN — ENOXAPARIN SODIUM 100 MG: 100 INJECTION SUBCUTANEOUS at 20:17

## 2018-11-06 RX ADMIN — ACETAMINOPHEN 975 MG: 325 TABLET, FILM COATED ORAL at 21:44

## 2018-11-06 RX ADMIN — GABAPENTIN 600 MG: 300 CAPSULE ORAL at 20:17

## 2018-11-06 RX ADMIN — GABAPENTIN 600 MG: 300 CAPSULE ORAL at 08:37

## 2018-11-06 RX ADMIN — LORAZEPAM 0.5 MG: 0.5 TABLET ORAL at 22:48

## 2018-11-06 RX ADMIN — Medication 5 ML: at 18:09

## 2018-11-06 RX ADMIN — OXYCODONE HYDROCHLORIDE 5 MG: 5 TABLET ORAL at 05:45

## 2018-11-06 RX ADMIN — THIAMINE HCL (VITAMIN B1) 50 MG TABLET 100 MG: at 10:33

## 2018-11-06 RX ADMIN — Medication 2 PACKET: at 20:27

## 2018-11-06 RX ADMIN — Medication 2 PACKET: at 14:08

## 2018-11-06 RX ADMIN — OXYCODONE HYDROCHLORIDE 10 MG: 10 TABLET ORAL at 21:45

## 2018-11-06 RX ADMIN — OXYCODONE HYDROCHLORIDE 10 MG: 10 TABLET ORAL at 18:30

## 2018-11-06 RX ADMIN — TIMOLOL MALEATE 1 DROP: 5 SOLUTION/ DROPS OPHTHALMIC at 08:38

## 2018-11-06 ASSESSMENT — PAIN DESCRIPTION - DESCRIPTORS
DESCRIPTORS: SORE
DESCRIPTORS: ACHING

## 2018-11-06 NOTE — PROGRESS NOTES
Care Coordinator - Discharge Planning    Admission Date/Time:  11/5/2018  Attending MD:  Carlene Bryant MD     Data  Date of initial CC assessment:  11/5  Chart reviewed, discussed with interdisciplinary team.   Patient was admitted for:   1. Cancer of distal third of esophagus (H)    2. G tube feedings (H)    3. Severe protein-calorie malnutrition (H)         Assessment   Full assessment completed in previous note    Coordination of Care and Referrals: Met with patient to review discharge planning. Discussed several components including home infusion referral, home care, clinic follow up plan, and enteral supplies.  Also collaborated with FRANCHESKA Davalos at [facility].  Ultimately, plan was established for patient to attend [facility] and participate in his G-tube/TF cares as much as possible with nursing staff prior to discharge. [facility] will provide hospital delivery prior to discharge as patient will likely still be on continuous regimen. No home care referral is in place. Patient will follow up in clinic as recommended and call [facility] and [facility] as needed with any questions or concerns.      Plan  Anticipated Discharge Date:  Wed 11/7  Anticipated Discharge Plan:  home    Roxie ARZATE Heme/Onc RN Care Coordinator  Pager [phone]

## 2018-11-06 NOTE — PHARMACY-CONSULT NOTE
Pharmacy Tube Feeding Consult     Medication reviewed for administration by feeding tube and for potential food/drug interactions.     Recommendation: No changes are needed at this time, however, a number of medications can be switched to liquid/suspension; Acetaminophen, Gabapentin, Lisinopril,  Oxycodone, and Omeprazole.      Pharmacy will continue to follow as new medications are ordered.    German Zelaya, LisbetD

## 2018-11-06 NOTE — H&P
Pharmacy Tube Feeding Consult    Medication reviewed for administration by feeding tube and for potential food/drug interactions.    Recommendation: No changes are needed at this time, however, a number of medications can be switched to liquid/suspension; Acetaminophen, Gabapentin, Lisinopril,  Oxycodone, and Omeprazole.     Pharmacy will continue to follow as new medications are ordered.

## 2018-11-06 NOTE — PLAN OF CARE
Problem: Patient Care Overview  Goal: Discharge Needs Assessment  11/06/18 2749     Stephanie Casas-Registered Nurse (Nursing)  Patient and brother attended feeding tube class in [facility]. Pt. RD correctly on model with all cares including site cares, flushing, anchoring tube, bolus feeds and use of Rod feeding pump and backpack. Received written material related to all content taught.Pt. Asked many relevant questions. Answered all teach-back questions appropriately. States he hopes to do bolus feedings once he can tolerate it instead of pump. States he understands all information presented.

## 2018-11-06 NOTE — PLAN OF CARE
"Problem: Patient Care Overview  Goal: Plan of Care/Patient Progress Review  Outcome: Improving  Obs goal 0600  Patient can be discharged if he is tolerating tube feeds well and no concern for refeeding syndrome. - Tolerating TF running at 10ml/hr    /88 (BP Location: Right arm)  Pulse 87  Temp 97.5  F (36.4  C) (Oral)  Resp 20  Ht 1.981 m (6' 6\")  Wt 110.3 kg (243 lb 1.6 oz)  SpO2 94%  BMI 28.09 kg/m2  Pt A&O x4, VSS on RA. Voiding spontaneously. Tolerating clear liquid diet, denies nausea. TF initiated through new peg tube at 0145, running 10ml/hr, goal is 60ml/hr. Dressing CDI. Up ind in room. Pain controlled with PRN Tylenol x1, Oxy x2. Port heparin locked. Patient has been resting comfortably through night. PLC scheduled at 0230. Will continue to monitor and follow POC.      "

## 2018-11-06 NOTE — PROGRESS NOTES
Hematology / Oncology Progress Note <<11/06/2018>>  ASSESSMENT & PLAN Reuben Padilla is a 59 y/o M PMH metastatic esophageal adenocarcinoma, PE, pAF, HTN, R shoulder pain, polyarthralgia, neuropathy, & progressive dysphagia admitted s/p G tube placement for monitoring of refeeding syndrome.    # Severe malnutrition  # Progressive dysphagia 2/2 to esophageal adenocarcinoma  - Presented to clinic 11/1 with c/o progressive dysphagia with solids over the last few weeks, characterized by knot in stomach and then regurgitation/self-induced vomiting. 15 lb weight loss in 1 week. XR esophagram performed today, showing mucosal irregularity corresponding to known esophageal mass with contrast passing readily through esophagus into stomach. There was dilation of the distal esophagus and status of contrast, most pronounced in supine position. Per outpatient discussion with radiology, contrast is moving through this area however there is dysmotility in the area of the mass causing dilatation and intermittent stasis. No persistent obstruction but likely having some intermittent obstruction, which is likely causing his symptoms  - Admitted from clinic 11/2 for PEG placement related to dysphagia but was neutropenic, sent home on GCSF & readmitted for G tube placement 11/5/18  - no formal SLP consult I can identify however with symptoms likely unlikely to be beneficial since 2/2 to mass at distal esophagus  - Full liquid diet  - Ordered Isosource 1.5 TF to start at 10 mL/hr and advance by 10 mL q8h pending tolerance and electrolytes. Goal is 60 ml/hr  - ProSource protein 2 packets per feeding tube TID  - Certavite to supplement TF  - 160 mL water flushes every 4 hours if patient unable to meet hydration needs via PO  - Glucose checks q6h while on TF  - Repeat nutrition consult rec's appreciated  -  PRN oxycodone PO and Zofran IV/PO for pain and nausea    # Hypomagnesemia on admission improved s/p replacement  # Monitor for refeeding  syndrome  - monitor & replace electrolytes per protocol    # Pancytopenia  Likely secondary to chemo. Counts are currently improved s/p multiple recent doses of Neupogen.   - Follow CBC  - Transfuse for Hb < 7 or platelets < 10K.    CHRONIC   H/O PE in the setting of active malignancy  - Resume admn lovenox 130 mg BID from tomorrow AM (12 hours post-op)  - check heparin Xa 4 hours s/p dose r/t weight loss & possible need for dose adjustment     B/L LE edema  B/L LE 2-3+ pitting edema which is chronic and overall stable per patient. No acute pain, erythema or calf tenderness. Noted to have mod venous incompetence in the region of B/L superficial femoral junctions on venous reflux study dated 8/12/16  - B/L LE doppler ultrasound to rule out DVT for risk stratification was negative for DVT. Patient already on therapeutic anticoagulation    HTN  - Resume home lisinopril 10 mg daily (lower dose after discussed with patient, who says he's been taken off but has been persistently HTN here)  - Hydralazine 10 mg IV PRN q6h for SBP > 160 or DBP > 110     H/O paroxysmal Afib  Irregular rhythm on admn auscultation. Rate controlled  - Monitor VS  - No need for tele monitoring at this time     Abnormal thyroid function tests  TSH borderline elevated at 4.80 on 11/5 and increased from 3.04 on 10/23. Free T4 normal on both checks. This suggests subclinical hypothyroidism.   - Check anti-TPO Ab.   - Repeat TFTs in 6-8 weeks.      #Neuropathy  Stable. Likely from chemo.  - Continue home gabapentin PO and gabapentin cream to apply in feet    Right shoulder pain  MRI right shoulder on 12/11/17 showed full-thickness tear in the supraspinatus, partial-thickness tear in the subscapularis and likely global labral degenerative tearing.   - No acute need of pain control as chr pain is stable     Other polyarthralgia   Possibly from Keytruda.  - No acute need of pain control as chr pain is stable     Metastatic esophageal adenocarcinoma,  liver metastases, widespread lavon metastasis  Tumor is positive for cytokeratin 20, negative for P63 and CK7, and HER2 negative. Started on FOLFOX on 5/15/17. Delay in treatment from 9/5-10/2/17 due to work related injury. He had an excellent response by imaging throughout the summer 2017, but a mixed response by imaging in late fall 2017. In January 2018, he was switched to Taxol/Cyramza with slight progression and neuropathy and clinical trial became available. In March 2018, he was started on the [facility] Match Clinical Trial with crizotinib (MET amplification). He remained on crizotinib March - July 2018, but was found to have progressive disease. He switched to Taxol/cyramza on 7/24/18 and needed treatment modification to days 1,8 every 21 days with cycle 2 due to neutropenia. With progressive disease on taxol/cyramza, he started palliative Keytruda on 10/23/2018. He has low PDL1 expression.      FEN:   - Tube feeds and full liquid diet as above (regular ordered, patient knows what he can & cannot order)  - PRN lyte replacement per standing protocol     Prophylaxis:   - Resume therapeutic Lovenox   - Continue home PPI for GI/PUD ppx     Code Status: DNR  Disposition: Likely discharge tomorrow pending tolerance to tube feeds and no suspicion of refeeding syndrome    Interval history  Initiation of TF was delayed 2/2 to hypomagnesemia - started at 1 am at 10 ml/hr & plan to increase to 20 ml/hr at 0900. Patient having pain at PEG tube insertion site increased oxycodone to 10 mg PO q 3 hours & added 1x dose of IV toradol. Tylenol & 5 mg PO oxycodone was not helpful. Denies fevers, chills, abdominal pain, diarrhea, cramping, leg cramps, nausea, vomiting. US BLE was negative. Monitoring heparin Xa level s/p dose r/t recent weight loss. Plan to discharge in AM working with RN CC.     Physical Exam  Constitutional: Awake, alert, cooperative, in NAD, appears comfortable, non-toxic, bright & interactive.  Eyes: PERRL, EOMI,  sclera clear, conjunctiva normal.  ENT: Normocephalic, without obvious abnormality, moist mucus membranes, no lesions or thrush.   Respiratory: Non-labored breathing, good air exchange, CTAB, no crackles or wheezing.  Cardiovascular: irregular, no murmur noted.  GI: +BS, soft, obese, new PEG in place, dressing with dried drainage but no pus/erythema.  Skin: No concerning lesions or rash on exposed areas. Chronic lower leg extremity skin changes.   Musculoskeletal: BLE edema 2 +.   Neurologic: Awake, A&O x 3. Cranial nerves II-XII are grossly intact.   Neuropsychiatric: Calm, normal affect     Rounding:  Temp:  [96.5  F (35.8  C)-97.9  F (36.6  C)] 96.5  F (35.8  C)  Pulse:  [87] 87  Heart Rate:  [] 94  Resp:  [10-20] 18  BP: (112-163)/() 132/98  SpO2:  [94 %-100 %] 94 %    I/O last 3 completed shifts:  In: 950 [I.V.:640]  Out: 655 [Urine:650; Blood:5]    Vitals:    11/05/18 1131 11/05/18 1809   Weight: (!) 156.1 kg (344 lb 2.2 oz) 110.3 kg (243 lb 1.6 oz)         Recent Labs  Lab 11/06/18  0634 11/05/18  0906 11/02/18  0905 11/01/18  1251   WBC 6.6 8.2 1.9* 2.7*   RBC 3.65* 3.95* 3.82* 4.18*   HGB 10.5* 11.3* 10.8* 11.7*   HCT 32.9* 35.6* 34.5* 37.6*   MCV 90 90 90 90   MCH 28.8 28.6 28.3 28.0   MCHC 31.9 31.7 31.3* 31.1*   RDW 17.9* 18.0* 17.1* 17.0*   * 124* 92* 129*       Recent Labs  Lab 11/06/18  0634 11/06/18  0103 11/05/18  1937 11/05/18  0906 11/02/18  0905 11/01/18  1251     --  141 141 142 141   POTASSIUM 3.9  --  4.1 3.7 3.9 4.3   CHLORIDE 111*  --  109 110* 112* 109   CO2 23  --  22 21 23 23   ANIONGAP 8  --  9 10 8 9   * 118* 152* 94 102* 92   BUN 12  --  15 15 16 19   CR 0.86  --  0.94 1.00 0.89 1.29*   GFRESTIMATED >90  --  83 77 88 57*   GFRESTBLACK >90  --  >90 >90 >90 69   NIDHI 8.5  --  8.7 8.5 8.1* 8.6   MAG 2.2 3.2* 1.5*  --  1.6  --    PHOS 3.9  --  3.1  --  2.5  --    PROTTOTAL  --   --  7.0 7.0 6.3* 7.0   ALBUMIN  --   --  3.5 3.3* 3.0* 3.5   BILITOTAL  --   --   0.4 0.8 0.9 1.5*   ALKPHOS  --   --  83 87 68 79   AST  --   --  19 17 18 21   ALT  --   --  19 16 16 20     Recent Results (from the past 24 hour(s))   US Lower Extremity Venous Duplex Bilateral    Narrative    EXAMINATION: DOPPLER VENOUS ULTRASOUND OF BILATERAL LOWER EXTREMITIES,  11/5/2018 10:03 PM     COMPARISON: None.    HISTORY: Swelling in both legs appears to be chronic but rule out DVT  given history of PE and active malignancy    TECHNIQUE:  Gray-scale evaluation with compression, spectral flow and  color Doppler assessment of the deep venous system of both legs from  groin to knee, and then at the ankles.    FINDINGS:  In both lower extremities, the common femoral, femoral, popliteal and  posterior tibial veins demonstrate normal compressibility and blood  flow.        Impression    IMPRESSION:  No evidence of deep venous thrombosis in either lower extremity.    I have personally reviewed the examination and initial interpretation  and I agree with the findings.    LUCY QUIROGA MD     Anti-infectives     None          enoxaparin  130 mg Subcutaneous Q12H     gabapentin  600 mg Oral BID     ketamine 5% gabapentin 8% lidocaine 2.5%   Topical TID     ketorolac  30 mg Intravenous Once     latanoprost  1 drop Both Eyes At Bedtime     lisinopril  20 mg Oral Daily     multivitamins with minerals  15 mL Per Feeding Tube Daily     omeprazole  40 mg Oral Daily     protein modular  2 packet Per Feeding Tube TID     sodium chloride (PF)  3 mL Intracatheter Q8H     timolol  1 drop Both Eyes Daily       [facility], [phone].  Hematology/Oncology November 6, 2018

## 2018-11-06 NOTE — PROGRESS NOTES
Patient can be discharged if he is tolerating tube feeds well and no concern for refeeding syndrome. - Meeting goal, TF increased to 20 ml/hr. New orders to increase Q2H if tolerating and electrolyte levels are stable.

## 2018-11-06 NOTE — PROGRESS NOTES
CLINICAL NUTRITION SERVICES - ASSESSMENT NOTE     Nutrition Prescription    RECOMMENDATIONS FOR MDs/PROVIDERS TO ORDER:  1. Patient would like to meet with a Registered Dietitian on an outpatient basis, please place consult prior to discharge. Pt stated that he is willing to meet with RD at the [facility] or if one is available at a hospital near home.   -- RD can assist with placing the consult, if desired.     2. If Mg and K+ are WNL and Phos > 1.9, TFs can be advanced q2h as tolerated.     Malnutrition Status:    Severe malnutrition in the context of acute on chronic disease.     Recommendations already ordered by Registered Dietitian (RD):  2 pkts Prosource TID   Free water flushes: 120 q4h (1814 ml total free H2O)    Thiamine 100 mg daily (x 10 days for prevention of refeeding)   Continue TFs as already ordered     Future/Additional Recommendations:  1. Pending LOS, consider bolus feedings:  Stop continuous TF for 1-2 hrs to let stomach empty prior to starting gravity feeding. Begin 1st gravity feeding @ 125 mL x 2 feedings (separate 3-4 hrs apart). If tolerated, increase vol to 250 mL x 2 feedings and then increase to goal vol of 500 mL x 3 feedings per day.  Flush with 160 mL of H20 before and after each feeding if TF to provide full hydration +2 ProSource Protein Pkt TID (total of 6 packets daily). Do not give feedings at night while pt asleep (during the day only).     OR     If patient prefers nocturnal cycle, recommend cycling as follows:   Isosource 1.5 @ goal 120 ml/hr (1440 ml/day) x 12 hours (8 pm - 8 am) to provide 2160 kcals (20 kcal/kg/day), 98 g PRO (0.9 g/kg/day), 1094 ml free H2O, 253 g CHO and 22 g Fiber daily.  + 2 pkts Prosource TID (120 kcals and 66 g PRO)     2. If patient LOS expected to be >/= 72 hours, recommend starting calorie counts to determine appropriateness of TF regimen.      REASON FOR ASSESSMENT  Reuben Padilla is a/an 58 year old male assessed by the dietitian for Provider Order -  "Registered Dietitian to Assess and Order TF per Medical Nutrition Therapy Protocol    NUTRITION HISTORY  Patient last seen by inpatient RD on :   \"Pt notes that he has not had a normal meal since 3-4 weeks ago. He has been unable to swallow many solid foods and over the past 10 days, and intake has been minimal, with maybe some some protein shakes (made from a powder, did not know name) and broths as main sources of nutrition. He reports that with solids, he vomits/regurgitates it, but that liquids are tolerated. Otherwise, prior to around a month ago, had been eating normally and denies any challenges with this (named pizza and burgers as examples of meals he enjoyed prior to difficulty eating).\"     Information obtained from patient:   - Patient stated that he has not experienced any abd pain/discomfort since starting TFs.   - Endorsed above nutrition intake as reported during prior RD visit on . Stated that is has been difficult not being able to eat well and expressed that it is going to be a difficult adjustment transitioning to obtaining full nutrition through TFs versus oral intake. Pt expressed that he plans to cancel his [employer] subscription as he finds it very difficult to watch the all of the commercials featuring food.     CURRENT NUTRITION ORDERS  Diet: Full Liquids   Intake/Tolerance: 50% of 1 meal ordered per RN flowsheet     LABS  Lytes: K+: 3.9, M.2, Phos: 3.9 (all WNL)   BGMs (24-hours): 94 - 152 (</= 180 mg/dL); levels increasing since starting TFs      MEDICATIONS  Omeprazole     ANTHROPOMETRICS  Height: 198.1 cm (6' 6\")  Most Recent Weight: 110.3 kg (243 lb 1.6 oz)    IBW: 97.3 kg  BMI: Overweight BMI 25-29.9  Weight History: Suspect inaccuracy of weight on  given large fluctuation. Patient stated that when wt was taken yesterday, he weighed 343#.   Wt Readings from Last 10 Encounters:   18 110.3 kg (243 lb 1.6 oz)   18 (!) 157.2 kg (346 lb 9.6 oz)    18 (!) 156.1 " kg (344 lb 3.2 oz)   10/23/18 (!) 163.2 kg (359 lb 11.2 oz)   10/12/18 (!) 163.8 kg (361 lb 1 oz)   10/10/18 (!) 163.8 kg (361 lb 1.8 oz)   10/02/18 (!) 162 kg (357 lb 4 oz)   09/04/18 (!) 172.3 kg (379 lb 12.8 oz)   08/28/18 (!) 168.7 kg (372 lb)   08/17/18 (!) 168 kg (370 lb 6 oz)       Dosing Weight: 112 kg (adjusted based on IBW of 97 kg and admission weight on 11/5 of 156 kg)     ASSESSED NUTRITION NEEDS  Estimated Energy Needs: 2240 - 2800 kcals/day (20 - 25 kcals/kg)  Justification: Obesity guidelines   Estimated Protein Needs: 168 - 224 grams protein/day (1.5 - 2 grams of pro/kg)  Justification: Obesity guidelines (aim for lower end of needs)    Estimated Fluid Needs: 2800 - 3360 mL/day (1 mL/kcal)   Justification: Maintenance and Per provider pending fluid status    PHYSICAL FINDINGS  See malnutrition section below.    MALNUTRITION  % Intake: </=50% for >/= 1 month (severe)  % Weight Loss: None noted  Subcutaneous Fat Loss: Facial region:  Moderate    Muscle Loss: Temporal and Facial & jaw region: Mild   Fluid Accumulation/Edema: Moderate   Malnutrition Diagnosis: Severe malnutrition in the context of acute on chronic disease.     NUTRITION DIAGNOSIS  Inadequate oral intake related to difficulty swallowing 2/2 medical condition as evidenced by patient consuming </= 50% of estimated nutritional needs over the past month, therefore requiring long-term feeding tube (PEG) placement to meet at least % of estimated nutrition needs.       INTERVENTIONS  Implementation  Nutrition Education: Provided education on new PEG placement for nutritional support, basics of tube feeds and types of regimens available (cycled vs bolus), tips to increase PO intake as tolerated, potential support with outpatient RD to aid in TF provisions + oral intake for weight maintenance and role of inpatient RD   Collaboration with other providers- spoke with bedside RN   Enteral Nutrition - Continue as already ordered   Feeding tube  flush  Multivitamin/mineral supplement therapy (see above)     Goals  Total avg nutritional intake to meet a minimum of 20 kcal/kg and 1.5 g PRO/kg daily (per dosing wt 112 kg).     Monitoring/Evaluation  Progress toward goals will be monitored and evaluated per protocol.    Trinh Hinton RD, LD   6A/7D pager [phone]

## 2018-11-06 NOTE — PROGRESS NOTES
"THORACIC SURGERY PROGRESS NOTE    S:  No overnight events.  Pt seen at bedside resting comfortably.  Does complain of mild sharp pains at PEG site when moving to upright position.  Otherwise no acute complaints.  Denies fevers, chills, diaphoresis.  No chest pain or shortness of breath or calf pain.  No BM.  No flatus.  Ambulating in halls.     O:  /78  Pulse 87  Temp 96.5  F (35.8  C) (Oral)  Resp 18  Ht 1.981 m (6' 6\")  Wt (!) 155.8 kg (343 lb 7.6 oz)  SpO2 96%  BMI 39.69 kg/m2      A&Ox3, NAD  Breathing non-labored on room air  RRR  Soft, NDNT. Bumper of G tube snug against skin. No erythema, drainage or skin breakdown  Distal extremities warm and well perfused  No calf tenderness.    Labs  WBC 6.6, Cr 0.86       A/P: Reuben Padilla is a 58 year old man with h/o esophageal cancer complicated by dehydration now POD #1 s/p esophagogastroduodenoscopy with percutaneous gastrostomy tube placement.    - Continue management per primary team   - Continue tube feeds as tolerated, titrate to goal  - DVT prophylaxis    Jamie Fuentes MD  General Surgery, PGY-1  Pg [phone]    "

## 2018-11-06 NOTE — PROGRESS NOTES
1800 OBS Goals  Patient can be discharged if he is tolerating tube feeds well and no concern for refeeding syndrome. Not Met    2200 OBS Goals  Patient can be discharged if he is tolerating tube feeds well and no concern for refeeding syndrome. Not Met    Cared for patient [id number]    Neuro: A&Ox4.   Cardiac: VSS.   Respiratory: Sating 95 on RA.  GI/: Adequate urine output.  Diet/appetite: Tolerating Full Liquid diet. TF not started yet. Mg+ 1.5. Recheck ordered and lab called to draw STAT at 2300.   Activity:  Assist of 1, up to chair.  Pain: At acceptable level on current regimen.   Skin: No new deficits noted. G tube capped per orders.   LDA's: G tube, Port    Plan: Continue with POC. Notify primary team with changes.    Patient refused Capno. Surgical Team d/c'd.

## 2018-11-06 NOTE — PROGRESS NOTES
0200 Obs Goal  Patient can be discharged if he is tolerating tube feeds well and no concern for refeeding syndrome. - TF started at 0145, running at 10ml/hr

## 2018-11-06 NOTE — PLAN OF CARE
Problem: Patient Care Overview  Goal: Plan of Care/Patient Progress Review  Outcome: Improving  Patient can be discharged if he is tolerating tube feeds well and no concern for refeeding syndrome. - Meeting goal, TF increased to 40 ml/hr. New orders to increase Q2H if tolerating and electrolyte levels are stable.    VSS on RA. A&Ox4. R chest port heparin locked. Tolerating regular diet. PEG TF @ 40 ml/hr. Can increase to 50 ml/hr at 1600 if tolerating. 120 ml flushes Q4H. No BM this shift. Voiding spontaneously. Up independently. PEG pain controlled w/ PRN Oxycodone and Tylenol. One time dose given of Toradol this AM. BLE giovanni colored, c/o numbness. Heparin 10a level came back at 1.11, NP notified. [facility] appointment today at 1430. Continue to monitor electrolytes and refeeding syndrome. Follow POC.

## 2018-11-06 NOTE — PROGRESS NOTES
"POST OP CHECK  11/05/2018    Reuben Padilla is a 58 year old male with h/o esophageal cancer complicated by dehydration now POD #0 s/p esophagogastroduodenoscopy with percutaneous gastrostomy tube placement.    Pt reports pain is controlled. No new issues.    BP (!) 155/99 (BP Location: Right arm)  Pulse 87  Temp 97.6  F (36.4  C) (Oral)  Resp 20  Ht 1.981 m (6' 6\")  Wt 110.3 kg (243 lb 1.6 oz)  SpO2 97%  BMI 28.09 kg/m2  General: Alert, interactive, & in NAD  Resp: CTAB, no crackles or wheezes  Cardiac: Regular rate; extremities warm  Abdomen: Obese, soft, non-tender, non-distended; G tube in place with drain sponge.  Incision: c/d/i withouth erythema, warmth, or discharge.   Extremities: No LE edema or obvious joint abnormalities    EBL 2 cc      A/P: No acute post-op issues. May feed through G tube immediately per previous dietary recommendations. Continue plan of care per primary team. Please call with questions.     Jamie Fuentes MD  General Surgery PGY-1  [phone]      "

## 2018-11-07 ENCOUNTER — HOME INFUSION (PRE-WILLOW HOME INFUSION) (OUTPATIENT)
Dept: PHARMACY | Facility: CLINIC | Age: 58
End: 2018-11-07

## 2018-11-07 VITALS
OXYGEN SATURATION: 96 % | HEIGHT: 78 IN | SYSTOLIC BLOOD PRESSURE: 121 MMHG | WEIGHT: 315 LBS | TEMPERATURE: 98.7 F | DIASTOLIC BLOOD PRESSURE: 80 MMHG | HEART RATE: 87 BPM | RESPIRATION RATE: 18 BRPM | BODY MASS INDEX: 36.45 KG/M2

## 2018-11-07 LAB
ANION GAP SERPL CALCULATED.3IONS-SCNC: 8 MMOL/L (ref 3–14)
BASOPHILS # BLD AUTO: 0 10E9/L (ref 0–0.2)
BASOPHILS NFR BLD AUTO: 0.3 %
BUN SERPL-MCNC: 19 MG/DL (ref 7–30)
CALCIUM SERPL-MCNC: 8 MG/DL (ref 8.5–10.1)
CHLORIDE SERPL-SCNC: 110 MMOL/L (ref 94–109)
CO2 SERPL-SCNC: 24 MMOL/L (ref 20–32)
CREAT SERPL-MCNC: 0.89 MG/DL (ref 0.66–1.25)
DIFFERENTIAL METHOD BLD: ABNORMAL
EOSINOPHIL # BLD AUTO: 0.1 10E9/L (ref 0–0.7)
EOSINOPHIL NFR BLD AUTO: 1.9 %
ERYTHROCYTE [DISTWIDTH] IN BLOOD BY AUTOMATED COUNT: 18.2 % (ref 10–15)
GFR SERPL CREATININE-BSD FRML MDRD: 88 ML/MIN/1.7M2
GLUCOSE BLDC GLUCOMTR-MCNC: 112 MG/DL (ref 70–99)
GLUCOSE SERPL-MCNC: 132 MG/DL (ref 70–99)
HCT VFR BLD AUTO: 35.6 % (ref 40–53)
HGB BLD-MCNC: 10.8 G/DL (ref 13.3–17.7)
IMM GRANULOCYTES # BLD: 0 10E9/L (ref 0–0.4)
IMM GRANULOCYTES NFR BLD: 0.5 %
LMWH PPP CHRO-ACNC: 0.69 IU/ML
LYMPHOCYTES # BLD AUTO: 1 10E9/L (ref 0.8–5.3)
LYMPHOCYTES NFR BLD AUTO: 27.8 %
MAGNESIUM SERPL-MCNC: 1.9 MG/DL (ref 1.6–2.3)
MCH RBC QN AUTO: 28.3 PG (ref 26.5–33)
MCHC RBC AUTO-ENTMCNC: 30.3 G/DL (ref 31.5–36.5)
MCV RBC AUTO: 93 FL (ref 78–100)
MONOCYTES # BLD AUTO: 0.3 10E9/L (ref 0–1.3)
MONOCYTES NFR BLD AUTO: 7.9 %
NEUTROPHILS # BLD AUTO: 2.3 10E9/L (ref 1.6–8.3)
NEUTROPHILS NFR BLD AUTO: 61.6 %
NRBC # BLD AUTO: 0 10*3/UL
NRBC BLD AUTO-RTO: 0 /100
PHOSPHATE SERPL-MCNC: 3.3 MG/DL (ref 2.5–4.5)
PLATELET # BLD AUTO: 111 10E9/L (ref 150–450)
POTASSIUM SERPL-SCNC: 3.8 MMOL/L (ref 3.4–5.3)
RBC # BLD AUTO: 3.82 10E12/L (ref 4.4–5.9)
SODIUM SERPL-SCNC: 142 MMOL/L (ref 133–144)
WBC # BLD AUTO: 3.7 10E9/L (ref 4–11)

## 2018-11-07 PROCEDURE — 85520 HEPARIN ASSAY: CPT | Performed by: NURSE PRACTITIONER

## 2018-11-07 PROCEDURE — 99217 ZZC OBSERVATION CARE DISCHARGE: CPT | Performed by: INTERNAL MEDICINE

## 2018-11-07 PROCEDURE — 00000146 ZZHCL STATISTIC GLUCOSE BY METER IP

## 2018-11-07 PROCEDURE — 25000132 ZZH RX MED GY IP 250 OP 250 PS 637: Performed by: NURSE PRACTITIONER

## 2018-11-07 PROCEDURE — 96372 THER/PROPH/DIAG INJ SC/IM: CPT

## 2018-11-07 PROCEDURE — 25000132 ZZH RX MED GY IP 250 OP 250 PS 637: Performed by: SURGERY

## 2018-11-07 PROCEDURE — 25000132 ZZH RX MED GY IP 250 OP 250 PS 637: Performed by: INTERNAL MEDICINE

## 2018-11-07 PROCEDURE — 36592 COLLECT BLOOD FROM PICC: CPT | Performed by: INTERNAL MEDICINE

## 2018-11-07 PROCEDURE — 25000128 H RX IP 250 OP 636: Performed by: INTERNAL MEDICINE

## 2018-11-07 PROCEDURE — 83735 ASSAY OF MAGNESIUM: CPT | Performed by: INTERNAL MEDICINE

## 2018-11-07 PROCEDURE — 84100 ASSAY OF PHOSPHORUS: CPT | Performed by: INTERNAL MEDICINE

## 2018-11-07 PROCEDURE — 80048 BASIC METABOLIC PNL TOTAL CA: CPT | Performed by: INTERNAL MEDICINE

## 2018-11-07 PROCEDURE — 36592 COLLECT BLOOD FROM PICC: CPT | Performed by: NURSE PRACTITIONER

## 2018-11-07 PROCEDURE — G0378 HOSPITAL OBSERVATION PER HR: HCPCS

## 2018-11-07 PROCEDURE — 27210429 ZZH NUTRITION PRODUCT INTERMEDIATE LITER

## 2018-11-07 PROCEDURE — 85025 COMPLETE CBC W/AUTO DIFF WBC: CPT | Performed by: INTERNAL MEDICINE

## 2018-11-07 PROCEDURE — 25000128 H RX IP 250 OP 636: Performed by: NURSE PRACTITIONER

## 2018-11-07 RX ORDER — OXYCODONE HYDROCHLORIDE 10 MG/1
10 TABLET ORAL
Qty: 14 TABLET | Refills: 0 | Status: SHIPPED | OUTPATIENT
Start: 2018-11-07 | End: 2018-11-13

## 2018-11-07 RX ORDER — ACETAMINOPHEN 325 MG/1
975 TABLET ORAL EVERY 8 HOURS PRN
Qty: 100 TABLET | COMMUNITY
Start: 2018-11-07 | End: 2019-09-03

## 2018-11-07 RX ORDER — AMINO AC/PROTEIN HYDR/WHEY PRO 10G-100/30
2 LIQUID (ML) ORAL 3 TIMES DAILY
Qty: 180 PACKET | Refills: 0 | Status: SHIPPED | OUTPATIENT
Start: 2018-11-07 | End: 2019-03-07

## 2018-11-07 RX ORDER — LANOLIN ALCOHOL/MO/W.PET/CERES
100 CREAM (GRAM) TOPICAL DAILY
Qty: 8 TABLET | Refills: 0 | Status: SHIPPED | OUTPATIENT
Start: 2018-11-08 | End: 2019-03-07

## 2018-11-07 RX ADMIN — LISINOPRIL 10 MG: 10 TABLET ORAL at 08:13

## 2018-11-07 RX ADMIN — OMEPRAZOLE 40 MG: 20 CAPSULE, DELAYED RELEASE ORAL at 08:12

## 2018-11-07 RX ADMIN — Medication 5 ML: at 08:14

## 2018-11-07 RX ADMIN — Medication 2 PACKET: at 08:15

## 2018-11-07 RX ADMIN — MULTIVITAMIN 15 ML: LIQUID ORAL at 08:12

## 2018-11-07 RX ADMIN — Medication 5 ML: at 00:40

## 2018-11-07 RX ADMIN — METOCLOPRAMIDE HYDROCHLORIDE 10 MG: 10 TABLET ORAL at 08:12

## 2018-11-07 RX ADMIN — HEPARIN 5 ML: 100 SYRINGE at 14:19

## 2018-11-07 RX ADMIN — METOCLOPRAMIDE HYDROCHLORIDE 10 MG: 10 TABLET ORAL at 11:48

## 2018-11-07 RX ADMIN — ACETAMINOPHEN 975 MG: 325 TABLET, FILM COATED ORAL at 08:12

## 2018-11-07 RX ADMIN — OXYCODONE HYDROCHLORIDE 10 MG: 10 TABLET ORAL at 04:44

## 2018-11-07 RX ADMIN — THIAMINE HCL (VITAMIN B1) 50 MG TABLET 100 MG: at 08:12

## 2018-11-07 RX ADMIN — Medication 5 ML: at 05:27

## 2018-11-07 RX ADMIN — OXYCODONE HYDROCHLORIDE 10 MG: 10 TABLET ORAL at 10:06

## 2018-11-07 RX ADMIN — GABAPENTIN 600 MG: 300 CAPSULE ORAL at 08:12

## 2018-11-07 RX ADMIN — ENOXAPARIN SODIUM 100 MG: 100 INJECTION SUBCUTANEOUS at 10:06

## 2018-11-07 RX ADMIN — TIMOLOL MALEATE 1 DROP: 5 SOLUTION/ DROPS OPHTHALMIC at 08:13

## 2018-11-07 ASSESSMENT — PAIN DESCRIPTION - DESCRIPTORS
DESCRIPTORS: SORE
DESCRIPTORS: SORE

## 2018-11-07 NOTE — PLAN OF CARE
Problem: Patient Care Overview  Goal: Plan of Care/Patient Progress Review  Pt alert and oriented x4. Pt VSS. Pt has g-tube and went to learning center to learn how to take care of tube and clean it. Pt receving oxycodone for pain around g-tube site. Pt independent. Pt has BM early on in shift. Pts tf increased to 60ml/hr. Tolerating well.

## 2018-11-07 NOTE — PLAN OF CARE
Problem: Patient Care Overview  Goal: Plan of Care/Patient Progress Review  Outcome: Completed Date Met: 11/07/18  Patient discharged at 1425. Port de-accessed. Patient sent home on TF @ 60 ml/hr. Sent with belongings. Transport brought patient to discharge pharmacy to  medications. Discharge education and material given. Patient explained understanding.

## 2018-11-07 NOTE — PROGRESS NOTES
"THORACIC SURGERY PROGRESS NOTE    S:  No overnight events.  Tolerating regular diet, minimal tenderness at G tube site.     O:  /77 (BP Location: Left arm)  Pulse 87  Temp 97.7  F (36.5  C) (Oral)  Resp 18  Ht 1.981 m (6' 6\")  Wt (!) 157.5 kg (347 lb 4.8 oz)  SpO2 95%  BMI 40.13 kg/m2      A&Ox3, NAD  Breathing non-labored on room air  RRR  Soft, NDNT. Bumper of G tube snug against skin. No erythema, drainage or skin breakdown  Distal extremities warm and well perfused  No calf tenderness.    Labs  WBC 3.7, Cr 0.89       A/P: Reuben Padilla is a 58 year old man with h/o esophageal cancer complicated by dehydration now POD #2 s/p esophagogastroduodenoscopy with percutaneous gastrostomy tube placement.    - Continue management per primary team   - Continue tube feeds as tolerated, titrate to goal  - DVT prophylaxis  - Call with questions    Jamie Fuentes MD  General Surgery, PGY-1  Pg [phone]    "

## 2018-11-07 NOTE — OP NOTE
Procedure Date: 11/05/2018      DATE OF OPERATION:  11/5/2018.      PREOPERATIVE DIAGNOSES:   1.  Stage IV esophageal adenocarcinoma with progression despite multiple lines of therapy.   2.  Failure to thrive.      POSTOPERATIVE DIAGNOSES:   1.  Stage IV esophageal adenocarcinoma with progression despite multiple lines of therapy.   2.  Failure to thrive.      PROCEDURE PERFORMED:   1.  Esophagogastroduodenoscopy.   2.  Percutaneous endoscopic gastrostomy tube placement with 2-point gastropexy.      ANESTHESIA:  General endotracheal anesthesia.      COMPLICATIONS:  None.      ESTIMATED BLOOD LOSS:  Less than 5 mL.      IMPLANTS:  None.      DRAINS:  20-[place] gastrostomy tube to gravity.      SPECIMENS:  None.      OPERATIVE FINDINGS:  The patient was found to have a mild malignant stricture at the gastroesophageal junction.  The adult gastroscope was able to be passed with minimal difficulty.  The rest of the examination in the stomach and the duodenum were normal. The patient had a minimal amount of undigested food in the stomach, but not in the esophagus.  We decided to avoid placing a stent, given the minimal stricture and the high risk for reflux.  A 20 Fr gastrostomy tube was placemed and tacked to the anterior abdominal wall using a Rodo-Alfonso device in 2 points.  The PEG tube was placed to gravity.      COMPLICATIONS:  None.      DESCRIPTION OF PROCEDURE IN DETAIL:  The patient was taken to the operating room, placed in a supine position.  All pressure points were adequately padded.  General endotracheal anesthesia was induced.  A formal timeout was carried out to confirm the name of the patient and correct procedure.  He had SCDs in place and functioning prior to induction of anesthesia.  He received antibiotics within 30 minutes of incision.  The left upper abdomen was prepped with ChloraPrep and draped in a normal sterile fashion.  We started by placing the gastroscope into the oropharynx and gently  advanced into the esophagus.  The patient had no evidence of retained food in the esophagus.  Mild stricture at the distal esophagus at the level of the gastroesophageal junction.  This was consistent with the patient's known cancer.  The adult gastroscope was able to be advanced with minimal difficulty through the stricture into the stomach.  The stomach and the duodenum appeared normal. We decided to avoid placing an esophageal stent since the stricture was considered to be minimal and at the risk of having severe reflux after stent placement was really high.  We then proceeded to place a gastrostomy tube.  The scope was advanced into the stomach.  The patient was placed in reverse Trendelenburg and air in the left upper quadrant of the abdomen was chosen for the gastrostomy 2 fingerbreadths below the costal margin and just to the left of the midline, the abdominal wall was transilluminated.  A needle was advanced through the abdominal wall into the lumen of the stomach and using a Seldinger technique, a wire was advanced through the needle.  A snare that was placed through the gastroscope was used to grasp the wire and pull it out through the mouth.  The PEG tube was then attached to the wire and the wire was then pulled from the abdominal side, pulling the PEG tube through the abdominal wall.  The scope was then advanced again into the stomach under direct vision.  The anterior wall of the stomach was tacked to the anterior abdominal wall using a Rodo-Alfonso device and 0 Vicryl.  We placed 2 stitches 180 degrees apart.  Once the sutures were tied and the PEG tube moved freely, hemostasis was confirmed.  The bolster was secured at 3 cm from the skin.  The PEG tube was then cut to appropriate length and connected to a bag to gravity.  The patient tolerated the procedure well.  The scope was removed.      All instrument and sponge counts were correct at the end of the case.         DUSTIN MENDEZ MD              D: 2018   T: 2018   MT: MARIANNA      Name:     KORI CHANEY   MRN:      [id number]        Account:        [id number]   :      1960           Procedure Date: 2018      Document: [id number]

## 2018-11-07 NOTE — PROGRESS NOTES
Observation goals:  Patient can be discharged if he is tolerating tube feeds well and no concern for refeeding syndrome: TF at goal rate of 60 mL/hr, pt tolerating well, denies abdominal fullness, n/v. C/o pain at PEG insertion site, denied any interventions.  @ 0136. Hep 10A 0.69 @ 0040. Continue to monitor.

## 2018-11-07 NOTE — PLAN OF CARE
Patient can be discharged if he is tolerating tube feeds well and no concern for refeeding syndrome. - Tolerating TF @ 60 ml/hr and electrolytes stable. Discharge orders placed.

## 2018-11-07 NOTE — DISCHARGE SUMMARY
[facility] -- Discharge Summary -- Hematology / Oncology  Date of Admission:  11/5/2018  Date of Discharge:  11/7/2018  Discharging Provider: Nieves Ly  Date of Service (when I saw the patient): 11/07/18    Discharge Diagnoses   Active Problems:    Malnutrition (H)    G tube feedings (H)    History of Present Illness  Reuben Padilla is a 57 y/o M PMH metastatic esophageal adenocarcinoma, PE, pAF, HTN, R shoulder pain, polyarthralgia, neuropathy, & progressive dysphagia admitted s/p G tube placement for monitoring of refeeding syndrome.    Hospital Course  Reuben Padilla was admitted for monitoring s/p G tube placement for severe malnutrition. Tolerating TF at goal without evidence of refeeding syndrome. Is having some mild discomfort at G tube insertion site but no evidence of leak or infection. Plan to discharge home today (s/p PLC tube teaching course yesterday). Adjusted his lovenox dose based on absorption (100 mg BID subcutaneous).    The following problems were addressed:  # Severe malnutrition  # Progressive dysphagia 2/2 to esophageal adenocarcinoma  - Presented to clinic 11/1 with c/o progressive dysphagia with solids over the last few weeks, characterized by knot in stomach and then regurgitation/self-induced vomiting. 15 lb weight loss in 1 week. XR esophagram performed today, showing mucosal irregularity corresponding to known esophageal mass with contrast passing readily through esophagus into stomach. There was dilation of the distal esophagus and status of contrast, most pronounced in supine position. Per outpatient discussion with radiology, contrast is moving through this area however there is dysmotility in the area of the mass causing dilatation and intermittent stasis. No persistent obstruction but likely having some intermittent obstruction, which is likely causing his symptoms  - Admitted from clinic 11/2 for PEG placement related to dysphagia  but was neutropenic, sent home on GCSF & readmitted for G tube placement 11/5/18  - no formal SLP consult I can identify however with symptoms likely unlikely to be beneficial since 2/2 to mass at distal esophagus  - Full liquid diet / regular diet with patient known limitations  - ProSource protein 2 packets per feeding tube TID  - Certavite to supplement TF  - 160 mL water flushes every 4 hours if patient unable to meet hydration needs via PO     # Hypomagnesemia on admission improved s/p replacement  # Monitor for refeeding syndrome  - monitor & replace electrolytes per protocol     # Pancytopenia  Likely secondary to chemo. Counts are currently improved s/p multiple recent doses of Neupogen.   - Follow CBC  - Transfuse for Hb < 7 or platelets < 10K     CHRONIC   H/O PE in the setting of active malignancy  - lovenox was dose adjusted based on heparin Xa levels 100 mg BID      B/L LE edema  B/L LE 2-3+ pitting edema which is chronic and overall stable per patient. No acute pain, erythema or calf tenderness. Noted to have mod venous incompetence in the region of B/L superficial femoral junctions on venous reflux study dated 8/12/16  - B/L LE doppler ultrasound to rule out DVT for risk stratification was negative for DVT. Patient already on therapeutic anticoagulation     HTN  - related to Cyramza, antihypertensive is no longer indicated per primary Dr Dee      H/O paroxysmal Afib  Irregular rhythm on admn auscultation. Rate controlled      Abnormal thyroid function tests  TSH borderline elevated at 4.80 on 11/5 and increased from 3.04 on 10/23. Free T4 normal on both checks. This suggests subclinical hypothyroidism.   - Check anti-TPO Ab.   - Repeat TFTs in 6-8 weeks.       #Neuropathy  Stable. Likely from chemo.  - Continue home gabapentin PO and gabapentin cream to apply in feet     Right shoulder pain  MRI right shoulder on 12/11/17 showed full-thickness tear in the supraspinatus, partial-thickness tear in the  subscapularis and likely global labral degenerative tearing.   - No acute need of pain control as chr pain is stable      Other polyarthralgia   Possibly from Keytruda.  - No acute need of pain control as chr pain is stable      Metastatic esophageal adenocarcinoma, liver metastases, widespread lavon metastasis  Tumor is positive for cytokeratin 20, negative for P63 and CK7, and HER2 negative. Started on FOLFOX on 5/15/17. Delay in treatment from 9/5-10/2/17 due to work related injury. He had an excellent response by imaging throughout the summer 2017, but a mixed response by imaging in late fall 2017. In January 2018, he was switched to Taxol/Cyramza with slight progression and neuropathy and clinical trial became available. In March 2018, he was started on the [facility] Match Clinical Trial with crizotinib (MET amplification). He remained on crizotinib March - July 2018, but was found to have progressive disease. He switched to Taxol/cyramza on 7/24/18 and needed treatment modification to days 1,8 every 21 days with cycle 2 due to neutropenia. With progressive disease on taxol/cyramza, he started palliative Keytruda on 10/23/2018. He has low PDL1 expression.    Pending Results   Unresulted Labs Ordered in the Past 30 Days of this Admission     Date and Time Order Name Status Description    11/5/2018 0906 T4 FREE In process         Code Status  FULL    Primary Care Physician   Physician No Ref-Primary    Physical Exam   Vital Signs with Ranges  Temp:  [96.8  F (36  C)-98.7  F (37.1  C)] 98.7  F (37.1  C)  Heart Rate:  [] 97  Resp:  [18] 18  BP: (121-134)/(77-90) 121/80  SpO2:  [95 %-97 %] 96 %  Constitutional: Awake, alert, cooperative, in NAD, appears comfortable, non-toxic, bright & interactive  Eyes: PERRL, EOMI, sclera clear, conjunctiva normal  ENT: Normocephalic, without obvious abnormality, moist mucus membranes, no lesions or thrush  Respiratory: Non-labored breathing, good air exchange, CTAB, no crackles  or wheezing  Cardiovascular: irregular, no murmur noted  GI: +BS, soft, obese, new PEG in place, dressing with dried drainage but no pus/erythema  Skin: No concerning lesions or rash on exposed areas. Chronic lower leg extremity skin changes  Musculoskeletal: BLE edema 2 +  Neurologic: Awake, A&O x 3. Cranial nerves II-XII are grossly intact  Neuropsychiatric: Calm, normal affect     Discharge Disposition  Home & in stable condition    Discharge Orders     DIETITIAN FOLLOW-UP   Patient would like to work with a Registered Dietitian to aid in weight maintenance with enteral feeds provisions & oral intake. Patient also struggling with poor oral intake given limited swallowing ability.     Home infusion referral     Reason for your hospital stay   Admitted for PEG placement & initiation of TF     Adult [facility] Follow-up and recommended labs and tests   - Please follow up next Tuesday as scheduled    Appointments on [place] and/or [facility] (with [facility] or [facility] provider or service). Call [phone] if you haven't heard regarding these appointments within 7 days of discharge.     Activity   Your activity upon discharge: activity as tolerated     When to contact your care team   Please call the [facility] Triage RN Line [phone] ([facility] M-F 8-5, after 5 pm the RN Advisor will page the On-Call Oncology Fellow who will return your call) for temperature greater than 100.4 F, uncontrolled nausea/vomiting/diarrhea or unrelieved constipation, pain not relieved by medications, bleeding not stopped by pressure, dizziness, chest pain, shortness of breath, changes in level of consciousness, or any other new concerning symptoms.     Tubes and drains   You are going home with the following tubes or drains: feeding tube G-Tube.   Tube cares per hospital or home care instructions     Full Code     Diet   Follow this diet upon discharge: Orders Placed This Encounter     Adult Formula Drip Feeding:  Continuous Isosource 1.5; Gastrostomy; Goal Rate: 60 mL/hour; mL/hr;   Medication - Tube Feeding Flush Frequency: At least 15-30 mL water before and after medication administration and with tube clogging         Regular Diet Adult- for pleasure only       Discharge Medications   Current Discharge Medication List      START taking these medications    Details   acetaminophen (TYLENOL) 325 MG tablet Take 3 tablets (975 mg) by mouth every 8 hours as needed for mild pain  Qty: 100 tablet    Associated Diagnoses: Cancer of distal third of esophagus (H)      enoxaparin (LOVENOX) 100 MG/ML injection Inject 1 mL (100 mg) Subcutaneous every 12 hours  Qty: 42 mL, Refills: 0    Associated Diagnoses: Cancer of distal third of esophagus (H)      oxyCODONE IR (ROXICODONE) 10 MG tablet Take 1 tablet (10 mg) by mouth every 3 hours as needed for breakthrough pain  Qty: 14 tablet, Refills: 0    Associated Diagnoses: Cancer of distal third of esophagus (H)      protein modular (PROSOURCE TF) LIQD 2 packets by Per Feeding Tube route 3 times daily  Qty: 180 packet, Refills: 0    Associated Diagnoses: Cancer of distal third of esophagus (H)      thiamine 100 MG tablet 1 tablet (100 mg) by Oral or Feeding Tube route daily for 8 days  Qty: 8 tablet, Refills: 0    Associated Diagnoses: Cancer of distal third of esophagus (H)         CONTINUE these medications which have NOT CHANGED    Details   diphenoxylate-atropine (LOMOTIL) 2.5-0.025 MG per tablet Take 1 tablet by mouth 4 times daily as needed for diarrhea  Qty: 40 tablet, Refills: 1    Associated Diagnoses: Diarrhea, unspecified type      fish oil-omega-3 fatty acids 1000 MG capsule Take 2 g by mouth daily      gabapentin (NEURONTIN) 300 MG capsule Two tablets in the am and one tablet in the afternoon and evening  Qty: 180 capsule, Refills: 1    Associated Diagnoses: Chemotherapy-induced neuropathy (H)      ketamine 5% gabapentin 8% lidocaine 2.5% topical PLO cream Use small amount  topically to feet three times daily  Qty: 30 g, Refills: 3    Comments: Pt would like this mailed to his house  Associated Diagnoses: Cancer of distal third of esophagus (H); Other secondary hypertension      LORazepam (ATIVAN) 0.5 MG tablet Take 1 tablet (0.5 mg) by mouth every 4 hours as needed (Anxiety, Nausea/Vomiting or Sleep)  Qty: 30 tablet, Refills: 2    Associated Diagnoses: Cancer of distal third of esophagus (H)      metoclopramide (REGLAN) 10 MG tablet Take 1 tablet (10 mg) by mouth 3 times daily  Qty: 120 tablet, Refills: 3    Associated Diagnoses: Gastroparesis      multivitamin, therapeutic with minerals (MULTI-VITAMIN) TABS tablet Take 1 tablet by mouth daily      omeprazole (PRILOSEC) 40 MG capsule Take 1 capsule (40 mg) by mouth daily  Qty: 90 capsule, Refills: 1    Associated Diagnoses: Cancer of distal third of esophagus (H)      timolol (TIMOPTIC) 0.5 % ophthalmic solution Place into both eyes daily    Associated Diagnoses: Cancer of distal third of esophagus (H)      zolpidem (AMBIEN) 10 MG tablet Take 1 tablet (10 mg) by mouth nightly as needed  Qty: 60 tablet, Refills: 1    Comments: Called in to [facility]  Associated Diagnoses: Metastasis from gastric cancer (H)      latanoprost (XALATAN) 0.005 % ophthalmic solution Place 1 drop into both eyes At Bedtime  Qty: 1 Bottle, Refills: 0    Associated Diagnoses: Cancer of distal third of esophagus (H)      lidocaine-prilocaine (EMLA) cream Apply topically as needed for moderate pain  Qty: 30 g, Refills: 3    Associated Diagnoses: Cancer of distal third of esophagus (H)      ondansetron (ZOFRAN) 8 MG tablet Take 1 tablet (8 mg) by mouth every 8 hours as needed (Nausea/Vomiting)  Qty: 30 tablet, Refills: 2    Associated Diagnoses: Cancer of distal third of esophagus (H)      prochlorperazine (COMPAZINE) 10 MG tablet Take 1 tablet (10 mg) by mouth every 6 hours as needed (Nausea/Vomiting)  Qty: 30 tablet, Refills: 2    Associated Diagnoses: Cancer of  distal third of esophagus (H)         STOP taking these medications       enoxaparin (LOVENOX) 150 MG/ML injection Comments:   Reason for Stopping:         filgrastim (NEUPOGEN) 480 MCG/0.8ML SOSY syringe Comments:   Reason for Stopping:         lisinopril (PRINIVIL/ZESTRIL) 40 MG tablet Comments:   Reason for Stopping:         tadalafil (CIALIS) 5 MG tablet Comments:   Reason for Stopping:             Allergies   Allergies   Allergen Reactions     Penicillin G Other (See Comments)     Pt not sure-     Penicillins Unknown     Data   Most Recent 3 CBC's:  Recent Labs   Lab Test  11/07/18   0525  11/06/18   0634  11/05/18   0906   WBC  3.7*  6.6  8.2   HGB  10.8*  10.5*  11.3*   MCV  93  90  90   PLT  111*  102*  124*      Most Recent 3 BMP's:  Recent Labs   Lab Test  11/07/18   0525  11/06/18   1812  11/06/18   1247   NA  142  139  141   POTASSIUM  3.8  3.9  3.9   CHLORIDE  110*  109  110*   CO2  24  22  24   BUN  19  20  16   CR  0.89  1.06  1.14   ANIONGAP  8  8  7   NIDHI  8.0*  8.2*  8.3*   GLC  132*  111*  110*     Nieves Ly  [facility]  [phone]  Hematology/Oncology  November 7, 2018

## 2018-11-07 NOTE — PROGRESS NOTES
Patient can be discharged if he is tolerating tube feeds well and no concern for refeeding syndrome.- Pt tube feeding running at 50ml/hr. Pt tolerating feedings well. Denies increase in pain or nausea related to feedings.

## 2018-11-08 NOTE — PROGRESS NOTES
This is a recent snapshot of the patient's [facility] medical record.  For current drug dose and complete information and questions, call [phone] or In Basket pool, [facility] home infusion ([id number])  CSN Number:  [id number]

## 2018-11-08 NOTE — PROGRESS NOTES
This is a recent snapshot of the patient's [facility] medical record.  For current drug dose and complete information and questions, call [phone] or In Basket pool, [facility] home infusion ([id number])  CSN Number:  [id number]

## 2018-11-13 ENCOUNTER — DOCUMENTATION ONLY (OUTPATIENT)
Dept: LAB | Facility: CLINIC | Age: 58
End: 2018-11-13

## 2018-11-13 ENCOUNTER — INFUSION THERAPY VISIT (OUTPATIENT)
Dept: ONCOLOGY | Facility: CLINIC | Age: 58
End: 2018-11-13
Attending: INTERNAL MEDICINE
Payer: COMMERCIAL

## 2018-11-13 ENCOUNTER — MYC MEDICAL ADVICE (OUTPATIENT)
Dept: ONCOLOGY | Facility: CLINIC | Age: 58
End: 2018-11-13

## 2018-11-13 ENCOUNTER — APPOINTMENT (OUTPATIENT)
Dept: LAB | Facility: CLINIC | Age: 58
End: 2018-11-13
Attending: INTERNAL MEDICINE
Payer: COMMERCIAL

## 2018-11-13 VITALS
BODY MASS INDEX: 39.29 KG/M2 | OXYGEN SATURATION: 97 % | TEMPERATURE: 97.8 F | WEIGHT: 315 LBS | HEART RATE: 93 BPM | RESPIRATION RATE: 18 BRPM | SYSTOLIC BLOOD PRESSURE: 96 MMHG | DIASTOLIC BLOOD PRESSURE: 61 MMHG

## 2018-11-13 VITALS — HEART RATE: 89 BPM | DIASTOLIC BLOOD PRESSURE: 78 MMHG | SYSTOLIC BLOOD PRESSURE: 121 MMHG

## 2018-11-13 DIAGNOSIS — T45.1X5A CHEMOTHERAPY-INDUCED NEUTROPENIA (H): Primary | ICD-10-CM

## 2018-11-13 DIAGNOSIS — C16.9 METASTASIS FROM GASTRIC CANCER (H): ICD-10-CM

## 2018-11-13 DIAGNOSIS — C15.5 CANCER OF DISTAL THIRD OF ESOPHAGUS (H): ICD-10-CM

## 2018-11-13 DIAGNOSIS — D70.1 CHEMOTHERAPY-INDUCED NEUTROPENIA (H): ICD-10-CM

## 2018-11-13 DIAGNOSIS — T45.1X5A CHEMOTHERAPY-INDUCED NEUTROPENIA (H): ICD-10-CM

## 2018-11-13 DIAGNOSIS — C79.9 METASTASIS FROM GASTRIC CANCER (H): ICD-10-CM

## 2018-11-13 DIAGNOSIS — D70.1 CHEMOTHERAPY-INDUCED NEUTROPENIA (H): Primary | ICD-10-CM

## 2018-11-13 LAB
ALBUMIN SERPL-MCNC: 3.4 G/DL (ref 3.4–5)
ALP SERPL-CCNC: 68 U/L (ref 40–150)
ALT SERPL W P-5'-P-CCNC: 20 U/L (ref 0–70)
ANION GAP SERPL CALCULATED.3IONS-SCNC: 6 MMOL/L (ref 3–14)
AST SERPL W P-5'-P-CCNC: 20 U/L (ref 0–45)
BASOPHILS # BLD AUTO: 0 10E9/L (ref 0–0.2)
BASOPHILS NFR BLD AUTO: 0.6 %
BILIRUB SERPL-MCNC: 0.6 MG/DL (ref 0.2–1.3)
BUN SERPL-MCNC: 22 MG/DL (ref 7–30)
CALCIUM SERPL-MCNC: 8.9 MG/DL (ref 8.5–10.1)
CHLORIDE SERPL-SCNC: 106 MMOL/L (ref 94–109)
CO2 SERPL-SCNC: 26 MMOL/L (ref 20–32)
CREAT SERPL-MCNC: 0.87 MG/DL (ref 0.66–1.25)
DIFFERENTIAL METHOD BLD: ABNORMAL
EOSINOPHIL # BLD AUTO: 0.1 10E9/L (ref 0–0.7)
EOSINOPHIL NFR BLD AUTO: 1.3 %
ERYTHROCYTE [DISTWIDTH] IN BLOOD BY AUTOMATED COUNT: 17.2 % (ref 10–15)
GFR SERPL CREATININE-BSD FRML MDRD: >90 ML/MIN/1.7M2
GLUCOSE SERPL-MCNC: 115 MG/DL (ref 70–99)
HCT VFR BLD AUTO: 35.3 % (ref 40–53)
HGB BLD-MCNC: 11.2 G/DL (ref 13.3–17.7)
IMM GRANULOCYTES # BLD: 0 10E9/L (ref 0–0.4)
IMM GRANULOCYTES NFR BLD: 0.2 %
LYMPHOCYTES # BLD AUTO: 1 10E9/L (ref 0.8–5.3)
LYMPHOCYTES NFR BLD AUTO: 18.2 %
MCH RBC QN AUTO: 28.3 PG (ref 26.5–33)
MCHC RBC AUTO-ENTMCNC: 31.7 G/DL (ref 31.5–36.5)
MCV RBC AUTO: 89 FL (ref 78–100)
MONOCYTES # BLD AUTO: 0.3 10E9/L (ref 0–1.3)
MONOCYTES NFR BLD AUTO: 5.1 %
NEUTROPHILS # BLD AUTO: 4.1 10E9/L (ref 1.6–8.3)
NEUTROPHILS NFR BLD AUTO: 74.6 %
NRBC # BLD AUTO: 0 10*3/UL
NRBC BLD AUTO-RTO: 0 /100
PLATELET # BLD AUTO: 168 10E9/L (ref 150–450)
POTASSIUM SERPL-SCNC: 4.6 MMOL/L (ref 3.4–5.3)
PROT SERPL-MCNC: 7.5 G/DL (ref 6.8–8.8)
RBC # BLD AUTO: 3.96 10E12/L (ref 4.4–5.9)
SODIUM SERPL-SCNC: 138 MMOL/L (ref 133–144)
T4 FREE SERPL-MCNC: 1.26 NG/DL (ref 0.76–1.46)
TSH SERPL DL<=0.005 MIU/L-ACNC: 4.84 MU/L (ref 0.4–4)
WBC # BLD AUTO: 5.4 10E9/L (ref 4–11)

## 2018-11-13 PROCEDURE — 99215 OFFICE O/P EST HI 40 MIN: CPT | Mod: ZP | Performed by: PHYSICIAN ASSISTANT

## 2018-11-13 PROCEDURE — 96413 CHEMO IV INFUSION 1 HR: CPT

## 2018-11-13 PROCEDURE — 84439 ASSAY OF FREE THYROXINE: CPT | Performed by: INTERNAL MEDICINE

## 2018-11-13 PROCEDURE — G0463 HOSPITAL OUTPT CLINIC VISIT: HCPCS | Mod: 25

## 2018-11-13 PROCEDURE — 25000128 H RX IP 250 OP 636: Mod: ZF | Performed by: PHYSICIAN ASSISTANT

## 2018-11-13 PROCEDURE — 85025 COMPLETE CBC W/AUTO DIFF WBC: CPT | Performed by: INTERNAL MEDICINE

## 2018-11-13 PROCEDURE — 84443 ASSAY THYROID STIM HORMONE: CPT | Performed by: INTERNAL MEDICINE

## 2018-11-13 PROCEDURE — 80053 COMPREHEN METABOLIC PANEL: CPT | Performed by: INTERNAL MEDICINE

## 2018-11-13 PROCEDURE — 96361 HYDRATE IV INFUSION ADD-ON: CPT

## 2018-11-13 RX ORDER — DIPHENHYDRAMINE HYDROCHLORIDE 50 MG/ML
50 INJECTION INTRAMUSCULAR; INTRAVENOUS
Status: CANCELLED
Start: 2018-11-13

## 2018-11-13 RX ORDER — SODIUM CHLORIDE 9 MG/ML
1000 INJECTION, SOLUTION INTRAVENOUS CONTINUOUS PRN
Status: CANCELLED
Start: 2018-11-13

## 2018-11-13 RX ORDER — HEPARIN SODIUM (PORCINE) LOCK FLUSH IV SOLN 100 UNIT/ML 100 UNIT/ML
5 SOLUTION INTRAVENOUS DAILY PRN
Status: DISCONTINUED | OUTPATIENT
Start: 2018-11-13 | End: 2018-11-16 | Stop reason: HOSPADM

## 2018-11-13 RX ORDER — ALBUTEROL SULFATE 90 UG/1
1-2 AEROSOL, METERED RESPIRATORY (INHALATION)
Status: CANCELLED
Start: 2018-11-13

## 2018-11-13 RX ORDER — ZOLPIDEM TARTRATE 10 MG/1
10 TABLET ORAL
Qty: 60 TABLET | Refills: 1 | Status: SHIPPED | OUTPATIENT
Start: 2018-11-13 | End: 2018-12-17

## 2018-11-13 RX ORDER — LORAZEPAM 0.5 MG/1
0.5 TABLET ORAL EVERY 4 HOURS PRN
Qty: 30 TABLET | Refills: 2 | Status: SHIPPED | OUTPATIENT
Start: 2018-11-13 | End: 2019-02-05

## 2018-11-13 RX ORDER — HEPARIN SODIUM (PORCINE) LOCK FLUSH IV SOLN 100 UNIT/ML 100 UNIT/ML
500 SOLUTION INTRAVENOUS ONCE
Status: CANCELLED
Start: 2018-11-13 | End: 2018-11-13

## 2018-11-13 RX ORDER — HEPARIN SODIUM (PORCINE) LOCK FLUSH IV SOLN 100 UNIT/ML 100 UNIT/ML
500 SOLUTION INTRAVENOUS ONCE
Status: COMPLETED | OUTPATIENT
Start: 2018-11-13 | End: 2018-11-13

## 2018-11-13 RX ORDER — EPINEPHRINE 1 MG/ML
0.3 INJECTION, SOLUTION INTRAMUSCULAR; SUBCUTANEOUS EVERY 5 MIN PRN
Status: CANCELLED | OUTPATIENT
Start: 2018-11-13

## 2018-11-13 RX ORDER — EPINEPHRINE 0.3 MG/.3ML
0.3 INJECTION SUBCUTANEOUS EVERY 5 MIN PRN
Status: CANCELLED | OUTPATIENT
Start: 2018-11-13

## 2018-11-13 RX ORDER — LORAZEPAM 2 MG/ML
0.5 INJECTION INTRAMUSCULAR EVERY 4 HOURS PRN
Status: CANCELLED
Start: 2018-11-13

## 2018-11-13 RX ORDER — MEPERIDINE HYDROCHLORIDE 25 MG/ML
25 INJECTION INTRAMUSCULAR; INTRAVENOUS; SUBCUTANEOUS EVERY 30 MIN PRN
Status: CANCELLED | OUTPATIENT
Start: 2018-11-13

## 2018-11-13 RX ORDER — ALBUTEROL SULFATE 0.83 MG/ML
2.5 SOLUTION RESPIRATORY (INHALATION)
Status: CANCELLED | OUTPATIENT
Start: 2018-11-13

## 2018-11-13 RX ORDER — METHYLPREDNISOLONE SODIUM SUCCINATE 125 MG/2ML
125 INJECTION, POWDER, LYOPHILIZED, FOR SOLUTION INTRAMUSCULAR; INTRAVENOUS
Status: CANCELLED
Start: 2018-11-13

## 2018-11-13 RX ORDER — OXYCODONE HYDROCHLORIDE 10 MG/1
10 TABLET ORAL
Qty: 20 TABLET | Refills: 0 | Status: SHIPPED | OUTPATIENT
Start: 2018-11-13 | End: 2019-01-16

## 2018-11-13 RX ADMIN — SODIUM CHLORIDE 1000 ML: 9 INJECTION, SOLUTION INTRAVENOUS at 09:37

## 2018-11-13 RX ADMIN — SODIUM CHLORIDE, PRESERVATIVE FREE 5 ML: 5 INJECTION INTRAVENOUS at 09:04

## 2018-11-13 RX ADMIN — SODIUM CHLORIDE, PRESERVATIVE FREE 500 UNITS: 5 INJECTION INTRAVENOUS at 12:23

## 2018-11-13 RX ADMIN — SODIUM CHLORIDE 200 MG: 900 INJECTION, SOLUTION INTRAVENOUS at 11:44

## 2018-11-13 ASSESSMENT — ENCOUNTER SYMPTOMS
INCREASED ENERGY: 1
ABDOMINAL PAIN: 0
HALLUCINATIONS: 0
ARTHRALGIAS: 1
DIARRHEA: 1
SYNCOPE: 0
DIZZINESS: 0
NUMBNESS: 1
NAUSEA: 1
CONSTIPATION: 1
MEMORY LOSS: 1
TREMORS: 0
HYPERTENSION: 1
ORTHOPNEA: 0
EXERCISE INTOLERANCE: 0
MUSCLE WEAKNESS: 1
WEIGHT GAIN: 0
SPEECH CHANGE: 0
SLEEP DISTURBANCES DUE TO BREATHING: 0
JAUNDICE: 0
LEG PAIN: 0
POLYDIPSIA: 0
PALPITATIONS: 1
HEARTBURN: 0
BLOOD IN STOOL: 0
NECK PAIN: 0
SEIZURES: 0
PARALYSIS: 0
BLOATING: 0
RECTAL PAIN: 0
HEADACHES: 1
FEVER: 0
BACK PAIN: 1
FATIGUE: 1
VOMITING: 1
NIGHT SWEATS: 0
TINGLING: 1
DISTURBANCES IN COORDINATION: 0
JOINT SWELLING: 0
LOSS OF CONSCIOUSNESS: 0
CHILLS: 1
BOWEL INCONTINENCE: 0
DECREASED APPETITE: 0
MYALGIAS: 1
LIGHT-HEADEDNESS: 1
WEIGHT LOSS: 1
ALTERED TEMPERATURE REGULATION: 1
HYPOTENSION: 1
SWOLLEN GLANDS: 0
STIFFNESS: 1
WEAKNESS: 1
BRUISES/BLEEDS EASILY: 0
POLYPHAGIA: 0
MUSCLE CRAMPS: 0

## 2018-11-13 ASSESSMENT — PAIN SCALES - GENERAL: PAINLEVEL: MILD PAIN (3)

## 2018-11-13 NOTE — MR AVS SNAPSHOT
After Visit Summary   11/13/2018    Reuben Padilla    MRN: [id number]           Patient Information     Date Of Birth          1960        Visit Information        Provider Department      11/13/2018 12:30 PM UC 22 ATC;  ONCOLOGY INFUSION [facility]        Today's Diagnoses     Chemotherapy-induced neutropenia (H)    -  1    Cancer of distal third of esophagus (H)          Care Instructions    Contact Numbers    [facility] Main Line: [phone]  [facility] Triage and after hours / weekends / holidays:  [phone]      Please call the triage or after hours line if you experience a temperature greater than or equal to 100.5, shaking chills, have uncontrolled nausea, vomiting and/or diarrhea, dizziness, shortness of breath, chest pain, bleeding, unexplained bruising, or if you have any other new/concerning symptoms, questions or concerns.      If you are having any concerning symptoms or wish to speak to a provider before your next infusion visit, please call your care coordinator or triage to notify them so we can adequately serve you.     If you need a refill on a narcotic prescription or other medication, please call before your infusion appointment.             November 2018 Sunday Monday Tuesday Wednesday Thursday Friday Saturday                       1     LAB    6:30 AM   (15 min.)   UR LAB HOME INFUSION   [facility] [place], Laboratory Services     XR ESOPHOGRAM W UPPER GI   11:00 AM   (45 min.)   UCXR2   [facility] Imaging Center Xray     [facility] LAB DRAW   11:15 AM   (15 min.)   [facility] LAB DRAW   [facility] Lab Draw     [facility] ONC INFUSION 120    1:00 PM   (120 min.)    ONCOLOGY INFUSION   [facility] RETURN    1:25 PM   (50 min.)   Katalina Ramirez PA-C   [facility]     Admission    5:13 PM   Beltran Garcia MD   [address] [facility] [place]   (Discharge: 11/2/2018) 2     3     UMP INJECTION   12:15 PM   (30 min.)    Nurse, Uc Oncology Injection   [facility]   4     UMP INJECTION   12:15 PM   (30 min.)   Nurse, Uc Oncology Injection   [facility] 5     P MASONIC LAB DRAW    8:15 AM   (15 min.)   [facility] LAB DRAW   [facility]     Admission   11:18 AM   Socorro Mcconnell MD   [address] [facility] [place]   (Discharge: 11/7/2018)     COMBINED ESOPHAGOSCOPY, GASTROSCOPY, DUODENOSCOPY (EGD), PLACE TRANSENDOSCOPIC ESOPHAGEAL STENT    1:45 PM   Lyn Fortune MD   UU OR     US LWR EXT VENOUS DUPLEX BILAT    8:10 PM   (40 min.)   UUUS1   [facility] [place], Ultrasound 6     UU ENTERAL TUBE FEEDING    2:30 PM   (90 min.)   Stephanie Casas, RN   [facility] 7     8     9     10       11     12     13     [facility] MAS[facility] LAB DRAW    9:00 AM   (15 min.)   UC [facility] LAB DRAW   [facility] Lab Draw     [facility] RETURN    9:15 AM   (50 min.)   Loraine Sutherland PA-C   [facility]   12:30 PM   (60 min.)   UC ONCOLOGY INFUSION   [facility] 14     15     16     17       18     19     20     UMP NEW   10:45 AM   (60 min.)   Nieves Butcher MD   [facility] 21     22     23     24       25     26     27     28     29 30 December 2018 Sunday Monday Tuesday Wednesday Thursday Friday Saturday                                 1       2     3     [facility] MASON[facility] LAB DRAW    8:00 AM   (15 min.)   UC [facility] LAB DRAW   [facility] Lab Draw     P RETURN    8:15 AM   (30 min.)   Kadi Dee MD   [facility]   10:00 AM   (60 min.)   UC ONCOLOGY INFUSION   [facility] 4     5     6     7     8       9     10     11     12     13     14     15       16     17     18     19     20     21     22       23     24     25     26     27     28     29       30     31                                           Recent Results (from the past 24 hour(s))   Comprehensive metabolic panel    Collection Time: 11/13/18  9:28 AM   Result Value Ref Range    Sodium 138 133 - 144 mmol/L    Potassium 4.6 3.4 - 5.3 mmol/L    Chloride 106 94 - 109 mmol/L    Carbon Dioxide 26 20 - 32 mmol/L    Anion Gap 6 3 - 14 mmol/L    Glucose 115 (H) 70 - 99 mg/dL    Urea Nitrogen 22 7 - 30 mg/dL    Creatinine 0.87 0.66 - 1.25 mg/dL    GFR Estimate >90 >60 mL/min/1.7m2    GFR Estimate If Black >90 >60 mL/min/1.7m2    Calcium 8.9 8.5 - 10.1 mg/dL    Bilirubin Total 0.6 0.2 - 1.3 mg/dL    Albumin 3.4 3.4 - 5.0 g/dL    Protein Total 7.5 6.8 - 8.8 g/dL    Alkaline Phosphatase 68 40 - 150 U/L    ALT 20 0 - 70 U/L    AST 20 0 - 45 U/L   TSH with free T4 reflex    Collection Time: 11/13/18  9:28 AM   Result Value Ref Range    TSH 4.84 (H) 0.40 - 4.00 mU/L   T4 free    Collection Time: 11/13/18  9:28 AM   Result Value Ref Range    T4 Free 1.26 0.76 - 1.46 ng/dL   CBC with platelets differential    Collection Time: 11/13/18 10:08 AM   Result Value Ref Range    WBC 5.4 4.0 - 11.0 10e9/L    RBC Count 3.96 (L) 4.4 - 5.9 10e12/L    Hemoglobin 11.2 (L) 13.3 - 17.7 g/dL    Hematocrit 35.3 (L) 40.0 - 53.0 %    MCV 89 78 - 100 fl    MCH 28.3 26.5 - 33.0 pg    MCHC 31.7 31.5 - 36.5 g/dL    RDW 17.2 (H) 10.0 - 15.0 %    Platelet Count 168 150 - 450 10e9/L    Diff Method Automated Method     % Neutrophils 74.6 %    % Lymphocytes 18.2 %    % Monocytes 5.1 %    % Eosinophils 1.3 %    % Basophils 0.6 %    % Immature Granulocytes 0.2 %    Nucleated RBCs 0 0 /100    Absolute Neutrophil 4.1 1.6 - 8.3 10e9/L    Absolute Lymphocytes 1.0 0.8 - 5.3 10e9/L    Absolute Monocytes 0.3 0.0 - 1.3 10e9/L    Absolute Eosinophils 0.1 0.0 - 0.7 10e9/L    Absolute Basophils 0.0 0.0 - 0.2 10e9/L    Abs Immature Granulocytes 0.0 0 - 0.4 10e9/L    Absolute Nucleated RBC 0.0                Follow-ups after your visit        Your next 10 appointments already scheduled     Nov 13, 2018  12:30 PM CST   Infusion 60 with UC ONCOLOGY INFUSION, UC 22 ATC   [facility] ([facility])    [address] [place] [address]  [address]  [facility] [address]   [phone]            Nov 20, 2018 11:00 AM CST   (Arrive by 10:45 AM)   New Patient Visit with Nieves Butcher MD   [facility] ([facility])    [address] [place] [address]  [address]  [facility] [address] [phone]            Dec 03, 2018  8:00 AM CST   [facility] Lab Draw with  M[facility]ONIC LAB DRAW   [facility] Lab Draw ([facility])    [address] [place] [address]  [address]  [facility] [address] [phone]            Dec 03, 2018  8:30 AM CST   (Arrive by 8:15 AM)   Return Visit with Kadi Dee MD   [facility] ([facility])    [address] [place] [address]  [address]  [facility] [address] [phone]            Dec 03, 2018 10:00 AM CST   Infusion 60 with UC ONCOLOGY INFUSION, UC 29 ATC   [facility] ([facility])    [address] [place] [address]  [address]  [facility] [address] [phone]              Who to contact     If you have questions or need follow up information about today's clinic visit or your schedule please contact [facility] directly at [phone].  Normal or non-critical lab and imaging results will be communicated to you by MyChart, letter or phone within 4 business days after the clinic has received the results. If you do not hear from us within 7 days, please contact the clinic through MyChart or phone. If you have a critical or abnormal lab result, we will notify you by phone as soon as possible.  Submit refill requests through [employer] or call your pharmacy and they will forward the refill request to us. Please allow 3 business days for your refill to be completed.          Additional Information About  Your Visit        [employer] Information     [employer] gives you secure access to your electronic health record. If you see a primary care provider, you can also send messages to your care team and make appointments. If you have questions, please call your primary care clinic.  If you do not have a primary care provider, please call [phone] and they will assist you.        Care EveryWhere ID     This is your Care EveryWhere ID. This could be used by other organizations to access your [place] medical records  [id number]         Blood Pressure from Last 3 Encounters:   11/13/18 96/61   11/07/18 121/80   11/04/18 145/77    Weight from Last 3 Encounters:   11/13/18 (!) 154.2 kg (340 lb)   11/07/18 (!) 157.5 kg (347 lb 4.8 oz)   11/02/18 (!) 157.2 kg (346 lb 9.6 oz)              We Performed the Following     CBC with platelets differential     Comprehensive metabolic panel     T4 free     T4 free     TSH with free T4 reflex          Today's Medication Changes          These changes are accurate as of 11/13/18 11:31 AM.  If you have any questions, ask your nurse or doctor.               These medicines have changed or have updated prescriptions.        Dose/Directions    gabapentin 300 MG capsule   Commonly known as:  NEURONTIN   This may have changed:    - how much to take  - when to take this  - additional instructions   Used for:  Chemotherapy-induced neuropathy (H)        Two tablets in the am and one tablet in the afternoon and evening   Quantity:  180 capsule   Refills:  1            Where to get your medicines      Some of these will need a paper prescription and others can be bought over the counter.  Ask your nurse if you have questions.     Bring a paper prescription for each of these medications     LORazepam 0.5 MG tablet    oxyCODONE IR 10 MG tablet    zolpidem 10 MG tablet                Primary Care Provider Fax #    Physician No Ref-Primary [phone]       No address on file        Equal Access to  Services     [facility]: Hadii aad ku hadolgaderik Dunlap, wajeanda luqadaha, qaybta kaalmajuancarlos sotelo, geraldo [place] . So [facility] [phone].    ATENCIÓN: Si alayna rodriguez, tiene a alamo disposición servicios gratuitos de asistencia lingüística. Llame al [phone].    We comply with applicable federal civil rights laws and [place] laws. We do not discriminate on the basis of race, color, national origin, age, disability, sex, sexual orientation, or gender identity.            Thank you!     Thank you for choosing [facility]  for your care. Our goal is always to provide you with excellent care. Hearing back from our patients is one way we can continue to improve our services. Please take a few minutes to complete the written survey that you may receive in the mail after your visit with us. Thank you!             Your Updated Medication List - Protect others around you: Learn how to safely use, store and throw away your medicines at www.disposemymeds.org.          This list is accurate as of 11/13/18 11:31 AM.  Always use your most recent med list.                   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)       diphenoxylate-atropine 2.5-0.025 MG per tablet    LOMOTIL    40 tablet    Take 1 tablet by mouth 4 times daily as needed for diarrhea    Diarrhea, unspecified type       enoxaparin 100 MG/ML injection    LOVENOX    42 mL    Inject 1 mL (100 mg) Subcutaneous every 12 hours    Cancer of distal third of esophagus (H)       gabapentin 300 MG capsule    NEURONTIN    180 capsule    Two tablets in the am and one tablet in the afternoon and evening    Chemotherapy-induced neuropathy (H)       latanoprost 0.005 % ophthalmic solution    XALATAN    1 Bottle    Place 1 drop into both eyes At  Bedtime    Cancer of distal third of esophagus (H)       lidocaine-prilocaine cream    EMLA    30 g    Apply topically as needed for moderate pain    Cancer of distal third of esophagus (H)       LORazepam 0.5 MG tablet    ATIVAN    30 tablet    Take 1 tablet (0.5 mg) by mouth every 4 hours as needed (Anxiety, Nausea/Vomiting or Sleep)    Cancer of distal third of esophagus (H)       metoclopramide 10 MG tablet    REGLAN    120 tablet    Take 1 tablet (10 mg) by mouth 3 times daily    Gastroparesis       Multi-vitamin Tabs tablet      Take 1 tablet by mouth daily        omeprazole 40 MG capsule    priLOSEC    90 capsule    Take 1 capsule (40 mg) by mouth daily    Cancer of distal third of esophagus (H)       ondansetron 8 MG tablet    ZOFRAN    30 tablet    Take 1 tablet (8 mg) by mouth every 8 hours as needed (Nausea/Vomiting)    Cancer of distal third of esophagus (H)       oxyCODONE IR 10 MG tablet    ROXICODONE    20 tablet    Take 1 tablet (10 mg) by mouth every 3 hours as needed for breakthrough pain    Cancer of distal third of esophagus (H)       prochlorperazine 10 MG tablet    COMPAZINE    30 tablet    Take 1 tablet (10 mg) by mouth every 6 hours as needed (Nausea/Vomiting)    Cancer of distal third of esophagus (H)       protein modular Liqd     180 packet    2 packets by Per Feeding Tube route 3 times daily    Cancer of distal third of esophagus (H)       thiamine 100 MG tablet     8 tablet    1 tablet (100 mg) by Oral or Feeding Tube route daily for 8 days    Cancer of distal third of esophagus (H)       timolol 0.5 % ophthalmic solution    TIMOPTIC     Place into both eyes daily    Cancer of distal third of esophagus (H)       zolpidem 10 MG tablet    AMBIEN    60 tablet    Take 1 tablet (10 mg) by mouth nightly as needed    Metastasis from gastric cancer (H)

## 2018-11-13 NOTE — PROGRESS NOTES
Rapid Response Epic Documentation     Situation:  The pt was having his port accessed when he became lightheaded and dizzy. Pt had about a 20 to 30 point drop in BP.  Objective:  Pt states that he feels better after he has been laying back. Pt denies any CP or SOB.  Assessment:  ABC's intact. CMS intact. Skin is pale warm and dry. BP 90 systolic.  Plan:  Pt is brought back to the infusion center for a bolus of fluid.  Location:    Disposition:      Stable (D/C home)    Protocol Used:     hypotensive

## 2018-11-13 NOTE — PROGRESS NOTES
Infusion Nursing Note:  Reuben Padilla presents today for Cycle 2 Day 1 Keytruda.    Patient seen by provider today: Yes: Nicole HILL    Treatment Conditions:  Lab Results   Component Value Date    HGB 11.2 11/13/2018     Lab Results   Component Value Date    WBC 5.4 11/13/2018      Lab Results   Component Value Date    ANEU 4.1 11/13/2018     Lab Results   Component Value Date     11/13/2018      Lab Results   Component Value Date     11/13/2018                   Lab Results   Component Value Date    POTASSIUM 4.6 11/13/2018           Lab Results   Component Value Date    MAG 1.9 11/07/2018            Lab Results   Component Value Date    CR 0.87 11/13/2018                   Lab Results   Component Value Date    NIDHI 8.9 11/13/2018                Lab Results   Component Value Date    BILITOTAL 0.6 11/13/2018           Lab Results   Component Value Date    ALBUMIN 3.4 11/13/2018                    Lab Results   Component Value Date    ALT 20 11/13/2018           Lab Results   Component Value Date    AST 20 11/13/2018           Note: Pt became dizzy during lab draw. BP 89/55, provider was notified by lab staff. Pt taken back to infusion early.  Nicole HILL met with pt during infusion.  No additional episodes of dizziness. BP at time of discharge: 121/78 HR: 89.     Intravenous Access:  Implanted Port.    Post Infusion Assessment:  Patient tolerated infusion without incident.  Blood return noted pre and post infusion.  Access discontinued per protocol.    Discharge Plan:   Prescription refills given for Oxycodone.  Discharge instructions reviewed with: Patient.  Patient and/or family verbalized understanding of discharge instructions and all questions answered.  Copy of AVS reviewed with patient and/or family.  Patient will return 12/3/18 for next appointment.  Patient discharged in stable condition accompanied by: sister.  Departure Mode: Ambulatory.  Face to Face time: 7 min.    Luzmaria COVINGTON  FRANCHESKA [place]

## 2018-11-13 NOTE — PATIENT INSTRUCTIONS
Contact Numbers    [facility] [place]: [phone]  [facility] Triage and after hours / weekends / holidays:  [phone]      Please call the triage or after hours line if you experience a temperature greater than or equal to 100.5, shaking chills, have uncontrolled nausea, vomiting and/or diarrhea, dizziness, shortness of breath, chest pain, bleeding, unexplained bruising, or if you have any other new/concerning symptoms, questions or concerns.      If you are having any concerning symptoms or wish to speak to a provider before your next infusion visit, please call your care coordinator or triage to notify them so we can adequately serve you.     If you need a refill on a narcotic prescription or other medication, please call before your infusion appointment.             November 2018 Sunday Monday Tuesday Wednesday Thursday Friday Saturday                       1     LAB    6:30 AM   (15 min.)   UR LAB HOME INFUSION   [facility], Laboratory Services     XR ESOPHOGRAM W UPPER GI   11:00 AM   (45 min.)   UCXR2   [facility] Imaging Center Xray     [facility] LAB DRAW   11:15 AM   (15 min.)   [facility] LAB [facility]RAW   [facility]     [facility] ONC INFUSION 120    1:00 PM   (120 min.)   UC ONCOLOGY INFUSION   [facility]     UMP RETURN    1:25 PM   (50 min.)   Katalina Ramirez PA-C   [facility]     Admission    5:13 PM   Beltran Garcia MD   Unit 7D [facility]   (Discharge: 11/2/2018) 2     3     UMP INJECTION   12:15 PM   (30 min.)   Nurse, Uc Oncology Injection   [facility]   4     UMP INJECTION   12:15 PM   (30 min.)   Nurse, Uc Oncology Injection   [facility] 5     P MASONIC LAB DRAW    8:15 AM   (15 min.)   UC MASON[facility] LAB DRAW   [facility] Lab Draw     Admission   11:18 AM   Socorro Mcconnell MD   Unit [address]D [facility]   (Discharge: 11/7/2018)     COMBINED ESOPHAGOSCOPY, GASTROSCOPY, DUODENOSCOPY (EGD),  PLACE TRANSENDOSCOPIC ESOPHAGEAL STENT    1:45 PM   Lyn Fortune MD   UU OR     US LWR EXT VENOUS DUPLEX BILAT    8:10 PM   (40 min.)   UUUS1   [facility], Ultrasound 6     UU ENTERAL TUBE FEEDING    2:30 PM   (90 min.)   Stephanie Casas, RN   [facility] 7     8     9     10       11     12     13     [facility] MASON[facility] LAB DRAW    9:00 AM   (15 min.)   UC MASONIC LAB DRAW   [facility] Masonic Lab Draw     UMP RETURN    9:15 AM   (50 min.)   Loraine Sutherland [facility]-C   [facility]   12:30 PM   (60 min.)   UC ONCOLOGY INFUSION   [facility] 14     15     16     17       18     19     20     UMP NEW   10:45 AM   (60 min.)   Nieves Butcher MD   [facility] 21     22     23     24       25     26     27     28     29     30 December 2018 Sunday Monday Tuesday Wednesday Thursday Friday Saturday                                 1       2     3     P MASONIC LAB DRAW    8:00 AM   (15 min.)   [facility] LAB [facility]RAW   [facility]     UMP RETURN    8:15 AM   (30 min.)   Kadi Dee MD   [facility]     UMP ONC INFUSION 60   10:00 AM   (60 min.)   UC ONCOLOGY INFUSION   [facility] 4     5     6     7     8       9     10     11     12     13     14     15       16     17     18     19     20     21     22       23     24     25     26     27     28     29       30     31                                          Recent Results (from the past 24 hour(s))   Comprehensive metabolic panel    Collection Time: 11/13/18  9:28 AM   Result Value Ref Range    Sodium 138 133 - 144 mmol/L    Potassium 4.6 3.4 - 5.3 mmol/L    Chloride 106 94 - 109 mmol/L    Carbon Dioxide 26 20 - 32 mmol/L    Anion Gap 6 3 - 14 mmol/L    Glucose 115 (H) 70 - 99 mg/dL    Urea Nitrogen 22 7 - 30 mg/dL    Creatinine 0.87 0.66 - 1.25 mg/dL    GFR Estimate >90 >60  mL/min/1.7m2    GFR Estimate If Black >90 >60 mL/min/1.7m2    Calcium 8.9 8.5 - 10.1 mg/dL    Bilirubin Total 0.6 0.2 - 1.3 mg/dL    Albumin 3.4 3.4 - 5.0 g/dL    Protein Total 7.5 6.8 - 8.8 g/dL    Alkaline Phosphatase 68 40 - 150 U/L    ALT 20 0 - 70 U/L    AST 20 0 - 45 U/L   TSH with free T4 reflex    Collection Time: 11/13/18  9:28 AM   Result Value Ref Range    TSH 4.84 (H) 0.40 - 4.00 mU/L   T4 free    Collection Time: 11/13/18  9:28 AM   Result Value Ref Range    T4 Free 1.26 0.76 - 1.46 ng/dL   CBC with platelets differential    Collection Time: 11/13/18 10:08 AM   Result Value Ref Range    WBC 5.4 4.0 - 11.0 10e9/L    RBC Count 3.96 (L) 4.4 - 5.9 10e12/L    Hemoglobin 11.2 (L) 13.3 - 17.7 g/dL    Hematocrit 35.3 (L) 40.0 - 53.0 %    MCV 89 78 - 100 fl    MCH 28.3 26.5 - 33.0 pg    MCHC 31.7 31.5 - 36.5 g/dL    RDW 17.2 (H) 10.0 - 15.0 %    Platelet Count 168 150 - 450 10e9/L    Diff Method Automated Method     % Neutrophils 74.6 %    % Lymphocytes 18.2 %    % Monocytes 5.1 %    % Eosinophils 1.3 %    % Basophils 0.6 %    % Immature Granulocytes 0.2 %    Nucleated RBCs 0 0 /100    Absolute Neutrophil 4.1 1.6 - 8.3 10e9/L    Absolute Lymphocytes 1.0 0.8 - 5.3 10e9/L    Absolute Monocytes 0.3 0.0 - 1.3 10e9/L    Absolute Eosinophils 0.1 0.0 - 0.7 10e9/L    Absolute Basophils 0.0 0.0 - 0.2 10e9/L    Abs Immature Granulocytes 0.0 0 - 0.4 10e9/L    Absolute Nucleated RBC 0.0

## 2018-11-13 NOTE — MR AVS SNAPSHOT
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)        Metastasis from gastric cancer (H)        Chemotherapy-induced neutropenia (H)           Follow-ups after your visit        Your next 10 appointments already scheduled     Nov 20, 2018 11:00 AM CST   (Arrive by 10:45 AM)   New Patient Visit with Nieves Butcher MD   [facility] ([facility])    [address] [place] [address]  [address]  [facility] [id number] [phone]            Dec 03, 2018  8:00 AM CST   [facility] Draw with  [facility]   [facility] Draw ([facility])    [address] [place] [address]  [address]  [facility] [id number] [phone]            Dec 03, 2018  8:30 AM CST   (Arrive by 8:15 AM)   Return Visit with Kadi Dee MD   [facility] ([facility])    [address] [place] [address]  [address]  [facility] [id number] [phone]            Dec 03, 2018 10:00 AM CST   Infusion 60 with  ONCOLOGY INFUSION, UC 29 ATC   Former[facility] ([facility])    [address] [place] [address]  [address]  [facility] [id number] [phone]              Who to contact     If you have questions or need follow up information about today's clinic visit or your schedule please contact [facility] directly at [phone].  Normal or non-critical lab and imaging results will be communicated to you by MyChart, letter or phone within 4 business days after the clinic has received the results. If you do not hear from us within 7 days, please contact the clinic through MyChart or  phone. If you have a critical or abnormal lab result, we will notify you by phone as soon as possible.  Submit refill requests through [employer] or call your pharmacy and they will forward the refill request to us. Please allow 3 business days for your refill to be completed.          Additional Information About Your Visit        Chatter[employer]art Information     [employer] gives you secure access to your electronic health record. If you see a primary care provider, you can also send messages to your care team and make appointments. If you have questions, please call your primary care clinic.  If you do not have a primary care provider, please call [phone] and they will assist you.        Care EveryWhere ID     This is your Care EveryWhere ID. This could be used by other organizations to access your [facility] medical records  [id number]        Your Vitals Were     Pulse Temperature Respirations Pulse Oximetry BMI (Body Mass Index)       93 97.8  F (36.6  C) (Oral) 18 97% 39.29 kg/m2        Blood Pressure from Last 3 Encounters:   11/13/18 121/78   11/13/18 96/61   11/07/18 121/80    Weight from Last 3 Encounters:   11/13/18 (!) 154.2 kg (340 lb)   11/07/18 (!) 157.5 kg (347 lb 4.8 oz)   11/02/18 (!) 157.2 kg (346 lb 9.6 oz)              Today, you had the following     No orders found for display         Today's Medication Changes          These changes are accurate as of 11/13/18 11:59 PM.  If you have any questions, ask your nurse or doctor.               These medicines have changed or have updated prescriptions.        Dose/Directions    gabapentin 300 MG capsule   Commonly known as:  NEURONTIN   This may have changed:    - how much to take  - when to take this  - additional instructions   Used for:  Chemotherapy-induced neuropathy (H)        Two tablets in the am and one tablet in the afternoon and evening   Quantity:  180 capsule   Refills:  1            Where to get your medicines      Some of these will need a paper  prescription and others can be bought over the counter.  Ask your nurse if you have questions.     Bring a paper prescription for each of these medications     LORazepam 0.5 MG tablet    oxyCODONE IR 10 MG tablet    zolpidem 10 MG tablet               Information about OPIOIDS     PRESCRIPTION OPIOIDS: WHAT YOU NEED TO KNOW   We gave you an opioid (narcotic) pain medicine. It is important to manage your pain, but opioids are not always the best choice. You should first try all the other options your care team gave you. Take this medicine for as short a time (and as few doses) as possible.    Some activities can increase your pain, such as bandage changes or therapy sessions. It may help to take your pain medicine 30 to 60 minutes before these activities. Reduce your stress by getting enough sleep, working on hobbies you enjoy and practicing relaxation or meditation. Talk to your care team about ways to manage your pain beyond prescription opioids.    These medicines have risks:    DO NOT drive when on new or higher doses of pain medicine. These medicines can affect your alertness and reaction times, and you could be arrested for driving under the influence (DUI). If you need to use opioids long-term, talk to your care team about driving.    DO NOT operate heavy machinery    DO NOT do any other dangerous activities while taking these medicines.    DO NOT drink any alcohol while taking these medicines.     If the opioid prescribed includes acetaminophen, DO NOT take with any other medicines that contain acetaminophen. Read all labels carefully. Look for the word  acetaminophen  or  Tylenol.  Ask your pharmacist if you have questions or are unsure.    You can get addicted to pain medicines, especially if you have a history of addiction (chemical, alcohol or substance dependence). Talk to your care team about ways to reduce this risk.    All opioids tend to cause constipation. Drink plenty of water and eat foods that  have a lot of fiber, such as fruits, vegetables, prune juice, apple juice and high-fiber cereal. Take a laxative (Miralax, milk of magnesia, Colace, Senna) if you don t move your bowels at least every other day. Other side effects include upset stomach, sleepiness, dizziness, throwing up, tolerance (needing more of the medicine to have the same effect), physical dependence and slowed breathing.    Store your pills in a secure place, locked if possible. We will not replace any lost or stolen medicine. If you don t finish your medicine, please throw away (dispose) as directed by your pharmacist. The [place] Pollution Control Agency has more information about safe disposal: https://www.pca.[email or web address].[place].us/living-green/managing-unwanted-medications         Primary Care Provider Fax #    Physician No Ref-Primary [phone]       No address on file        Equal Access to Services     OLLIE SHARPE : Eliza Dunlap, yeyo davis, nancy sotelo, geraldo washington . So [facility] [phone].    ATENCIÓN: Si habla español, tiene a alamo disposición servicios gratuitos de asistencia lingüística. Tanisha al [phone].    We comply with applicable federal civil rights laws and [place] laws. We do not discriminate on the basis of race, color, national origin, age, disability, sex, sexual orientation, or gender identity.            Thank you!     Thank you for choosing [facility]  for your care. Our goal is always to provide you with excellent care. Hearing back from our patients is one way we can continue to improve our services. Please take a few minutes to complete the written survey that you may receive in the mail after your visit with us. Thank you!             Your Updated Medication List - Protect others around you: Learn how to safely use, store and throw away your medicines at www.disposemymeds.org.          This list is accurate as of 11/13/18 11:59 PM.   Always use your most recent med list.                   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)       diphenoxylate-atropine 2.5-0.025 MG per tablet    LOMOTIL    40 tablet    Take 1 tablet by mouth 4 times daily as needed for diarrhea    Diarrhea, unspecified type       enoxaparin 100 MG/ML injection    LOVENOX    42 mL    Inject 1 mL (100 mg) Subcutaneous every 12 hours    Cancer of distal third of esophagus (H)       gabapentin 300 MG capsule    NEURONTIN    180 capsule    Two tablets in the am and one tablet in the afternoon and evening    Chemotherapy-induced neuropathy (H)       latanoprost 0.005 % ophthalmic solution    XALATAN    1 Bottle    Place 1 drop into both eyes At Bedtime    Cancer of distal third of esophagus (H)       lidocaine-prilocaine cream    EMLA    30 g    Apply topically as needed for moderate pain    Cancer of distal third of esophagus (H)       LORazepam 0.5 MG tablet    ATIVAN    30 tablet    Take 1 tablet (0.5 mg) by mouth every 4 hours as needed (Anxiety, Nausea/Vomiting or Sleep)    Cancer of distal third of esophagus (H)       metoclopramide 10 MG tablet    REGLAN    120 tablet    Take 1 tablet (10 mg) by mouth 3 times daily    Gastroparesis       Multi-vitamin Tabs tablet      Take 1 tablet by mouth daily        omeprazole 40 MG capsule    priLOSEC    90 capsule    Take 1 capsule (40 mg) by mouth daily    Cancer of distal third of esophagus (H)       ondansetron 8 MG tablet    ZOFRAN    30 tablet    Take 1 tablet (8 mg) by mouth every 8 hours as needed (Nausea/Vomiting)    Cancer of distal third of esophagus (H)       oxyCODONE IR 10 MG tablet    ROXICODONE    20 tablet    Take 1 tablet (10 mg) by mouth every 3 hours as needed for breakthrough pain    Cancer of distal third of esophagus (H)        prochlorperazine 10 MG tablet    COMPAZINE    30 tablet    Take 1 tablet (10 mg) by mouth every 6 hours as needed (Nausea/Vomiting)    Cancer of distal third of esophagus (H)       protein modular Liqd     180 packet    2 packets by Per Feeding Tube route 3 times daily    Cancer of distal third of esophagus (H)       thiamine 100 MG tablet     8 tablet    1 tablet (100 mg) by Oral or Feeding Tube route daily for 8 days    Cancer of distal third of esophagus (H)       timolol 0.5 % ophthalmic solution    TIMOPTIC     Place into both eyes daily    Cancer of distal third of esophagus (H)       zolpidem 10 MG tablet    AMBIEN    60 tablet    Take 1 tablet (10 mg) by mouth nightly as needed    Metastasis from gastric cancer (H)

## 2018-11-13 NOTE — NURSING NOTE
Chief Complaint   Patient presents with     Port Draw     labs drawn via port by RN. VS taken.      Labs drawn via Port accessed using 20g gripper needle. Line flushed and Heparin locked. Vital signs taken.    During port draw, patient because dizzy. BP 89/55, provider notified, and patient reclined in chair. Per provider, patient taken to infusion therapy.       Billie Montalvo RN

## 2018-11-14 NOTE — TELEPHONE ENCOUNTER
Placed call to Levi to discuss his MyChart message. Left a brief message asking him to return call to discuss how he is feeling. If his weight stabilizes and he is feeling ok we will wait on scans. If he is continuing to lose weight and is not feeling well we can order scans sooner.

## 2018-11-15 ENCOUNTER — HOME INFUSION (PRE-WILLOW HOME INFUSION) (OUTPATIENT)
Dept: PHARMACY | Facility: CLINIC | Age: 58
End: 2018-11-15

## 2018-11-15 ENCOUNTER — CARE COORDINATION (OUTPATIENT)
Dept: ONCOLOGY | Facility: CLINIC | Age: 58
End: 2018-11-15

## 2018-11-15 NOTE — PROGRESS NOTES
"Levi returned call today to discuss his symptoms and plan for next scans.  He reports doing \" ok, wish I could eat food but the tube is doing ok\"  He denies dizziness since leaving clinic on Tuesday, \"I don't know what happened it just hit me when I was there\"  Confirmed he is no longer taking his BP meds.  He is attempting to take oral fluids and food, reports swallowing is the same as the last time he saw Dr Dee (10/23/18)  Discussed the recommendation of waiting until he has had at least 3 (possbily 4 ) cycles of Keytruda before doing another scan unless he is not maintaining his weight or has worsening symptoms. He denies worsening symptoms at this time. Recommend that he monitor his weight and notify us if it continues to decrease.  He became angry and stated, \"fine, it's obvious that you people will just do whatever you want\". Writer again explained that this is the normal process ,but if he felt he should have the scan sooner Dr Dee would be willing to order the scan. Further explained that with Keytruda there can be a pseudo progression seen if a scan if done too early and may not be a true reflection of what is happening. \"whatever, you people are just against scans\". Writer again stated that we are following the protocol for this drug and again reinforced that if he felt he needed a scan sooner or that he had additional symptoms Dr Dee will order the scan.   He then became angrier saying, \"not once has anyone said a word about pseudo progression or any progression\" Writer firmly stated that Dr Dee discussed this with him in the appointment by Dr Dee and again by writer when we reviewed the drug and the plan. Reminded him that Dr Dee was clear about this drug taking 3-4 months to show improvement if it is going to work and that is the reason we could not wait any longer to place his g-tube. He continued to deny  this conversation took place.  He then began repeating \"I've always had scans every " "2 months and now your making me wait for 3 months\" Writer explained that scans are not necessarily ordered in \"monthly\" time frames, they are ordered after X-amount of cycles and based on symptoms. \"That is exactly the problem, that's what killed me, waiting for that trial\". Writer attempted to explain further but he would not allow further conversation.  Writer redirected the conversation by telling him he has been given all the information, he needs to say what he wants. If he wants the scan writer will update Dr Dee. He ended the call saying \"whatever, I'll just wait until I see her on the 3rd\"  Encouraged him to call writer if his condition changes or he has further questions.   "

## 2018-11-16 NOTE — PROGRESS NOTES
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 His tumor is positive for cytokeratin 20, negative for P63 and CK7, and HER2 is negative. He started on FOLFOX (5FU/oxaliplatin) on 5/15/2017. He had a delay in treatment from 9/5-10/2/17 due to work related injury.    He had an excellent response by imaging throughout the summer 2017.    He a mixed response by imaging in late fall 2017.    In January 2018, he was switched to taxol and cyramza - had slight progression and neuropathy and clinical trial became available.       In March 2018, he was started on the [facility] Match Clinical Trial with crizotinib.  (MET amplification) He remained on crizotinib from March - July 2018. He was found to have progressive disease. He switched to Taxol/cyramza on 7/24/18. His treatment was changed to days 1,8 every 21 days with cycle 2 due to neutropenia.    Progressive disease on Taxol, Cyramaza. Started palliative Keytruda on 10/23/18.      INTERVAL HISTORY: Recently Levi was admitted due to progressive dysphagia to the point he is unable to keep most solids down. After eating trials with solid foods he feels a \"knot\" in his esophagus. His mouth then starts watering, and he regurgitates the food or induces vomiting himself for comfort. He has tried just waiting, and eventually the knot will go away in 1-2 hours. He has been trying to keep up with fluids, which do go down OK, he is unsure of how much fluid he gets in. Estimates maybe half gallon but isn't entirely sure about this. Appetite is poor. He has not had fevers, nausea, abdominal pain, or bowel changes.Was having dizziness, likely due to poor hydration, malnutrition and anti hypertensives.     Levi was admitted for a PEG tube but was neutropenic oddly.  He was given neupogen and then " readmitted for the PEG.  Since then he has been doing 6 cans/day basically 24 hours on, via the pump at 60 ml/hour.  He is drinking about 3-4 bottles of water daily.  He tolerates the feeds well- no nausea, burping/belching or diarrhea to indicate dumping syndrome.  He is wanting to eat and wishes to understand the plan better.   His joint pain has resolved.  He is taking oxycodone 1 x day for PEG related discomfort.     His 10-point review of systems is otherwise negative.        PHYSICAL EXAMINATION  BP 96/61  Pulse 93  Temp 97.8  F (36.6  C) (Oral)  Resp 18  Wt (!) 154.2 kg (340 lb)  SpO2 97%  BMI 39.29 kg/m2    Wt Readings from Last 4 Encounters:   11/13/18 (!) 154.2 kg (340 lb)   11/07/18 (!) 157.5 kg (347 lb 4.8 oz)   11/02/18 (!) 157.2 kg (346 lb 9.6 oz)   11/01/18 (!) 156.1 kg (344 lb 3.2 oz)     Constitutional: Alert, oriented male in no visible distress. In a good mood today  Eyes: PERRL. Anicteric sclerae.  ENT/Mouth: OM moist and pink without lesions or thrush.  CV:RRR  Resp: CTAB throughout  Abdomen: Soft, non-tender, non-distended. Obese. Bowel sounds present. Unable to palpate liver or spleen. PEG in place, quite taught today with skin breakdown.  Extremities: 1+ pitting edema  b/l  Skin: Warm, dry. Appears pale  Lymph: No cervical or supraclavicular lymphadenopathy appreciated.   Neuro: CN II-XII grossly intact.  Absent reflexed at knees bilaterally    ECOG PS: 1    LABS:       11/13/2018 10:08   WBC 5.4   Hemoglobin 11.2 (L)   Hematocrit 35.3 (L)   Platelet Count 168   RBC Count 3.96 (L)   MCV 89      11/7/2018 05:25 11/13/2018 09:28   Sodium 142 138   Potassium 3.8 4.6   Chloride 110 (H) 106   Carbon Dioxide 24 26   Urea Nitrogen 19 22   Creatinine 0.89 0.87   GFR Estimate 88 >90   GFR Estimate If Black >90 >90   Calcium 8.0 (L) 8.9   Anion Gap 8 6   Magnesium 1.9    Phosphorus 3.3    Albumin  3.4   Protein Total  7.5   Bilirubin Total  0.6   Alkaline Phosphatase  68   ALT  20   AST  20   T4  Free  1.26   TSH  4.84 (H)   Glucose 132 (H) 115 (H)     IMPRESSION/PLAN: Reuben Padilla is a 58-year-old with metastatic esophogeal adenocarcinoma, with progressive disease on multiple lines of therapy, including FOLFOX, Taxol/Cyramza, crizotinib, and again on Taxol/Cyramza. Currently with worsening disease and need for enteral nutrition    Metastatic esophogeal adenocarcinoma. Most recent imaging 10/2018 showing slight progression of disease, and symptomatically with increased dysphagia. He has low PDL1 expression. He started palliative Keytruda 10/23/2018.  He had neutropenia unknown if related to Keytruda.  He also had joint pains for a few days as well which may be immunotherapy related.  Today, he is feeling fine, labs were reviewed and we'll plan for C2 of Keytruda.    RTC in 3 weeks for C3, likely image after 3-4 cycles.     Dysphagia: secondary to his cancer. Upper endoscopy 10/10/18 showing mild extrinsic compression of the distal esophagus, a small, non-obstructing lesion at the GEJ, and evidence of gastroparesis. He was started on a trial of Reglan. XR esophagram showed mucosal irregularity corresponding to known esophageal mass with contrast passing readily through esophagus and into the stomach. There was dilation of the distal esophagus and status of contrast, most pronounced in supine position. Discussed with radiologist, contrast is moving through this area however there is dysmotility in the area of the mass causing dilatation and intermittent statis. No persistent obstruction but likely having some intermittent obstruction. Per discussion with radiology, this likely is causing his symptoms.    PEG was placed and patient getting 6 cans in via pump at 60 ml/hour.  He wanted a plan for his nutrition and we discussed the followin. Increase to by 10 ml/hour a day.  By Friday he will be at 100 ml/hour. Then stay here all weekend and observe for tolerability.  Concerning symptoms would be  burping/belching, fullness, nausea or diarrhea.  If this occurs, back up 20 mg/hour and then call on Monday.   2. Goal is to have 3 feed a day and not do continuous feeds 24 hours a day.  He would like to eat and this will allow breaks in the feeding schedule to creat hunger and eat food.   3. Discussed counting calories.  Right now he is getting in 1419-8775 hugh/day with just the Isosource.  Drank a Boost in infusion and liquids are going down without issues. Thus adding in Boost, soup, smoothies will increase daily calories.  Overall goal is likely 3000 hugh/day.  His weigh is still dropping, thus needs to stay at 6 cans/day and work on adding in oral calories  4.  Can stop the 140 ml flush q4.  He is drinking close to 2 L daily.  All hydration should stay orally.   5. PEG was loosened slightly today and encouraged desitin around skin daily for skin breakdown.    Remainder of plan is as below:    FEN/Renal: Weight loss as above in context of poor PO intake and not quite getting to goals of calories/day.  Creat at baseline. Lytes WNL.     Achiness in hand joints, knee joints bilaterally: could be secondary to Keytruda, currently resolved today. Asked that he monitor with second dose today.      History of pulmonary embolism.  He is on Lovenox 100 mg twice daily, decreased inpatient due to elevated Xa.  He has progressed through Xarelto in the past.       Lightheadedness: Off all anti hypertensives at this time. Had episode of vasovagal in lab today, IVF started, patient was feeling fine end of visit    Neuropathy: He does have baseline neuropathy.  Gabapentin is maintained at 600/300/600 mg daily in addition to gabapentin gel.    Edema: stable. US were done to recheck legs and neg.     Nicole Sutherland PA-C

## 2018-11-16 NOTE — PROGRESS NOTES
This is a recent snapshot of the patient's [facility] medical record.  For current drug dose and complete information and questions, call [phone] or In Basket pool, [facility] ([id number])  CSN Number:  [id number]

## 2018-11-20 ENCOUNTER — OFFICE VISIT (OUTPATIENT)
Dept: PALLIATIVE CARE | Facility: CLINIC | Age: 58
End: 2018-11-20
Attending: INTERNAL MEDICINE
Payer: COMMERCIAL

## 2018-11-20 VITALS
RESPIRATION RATE: 16 BRPM | OXYGEN SATURATION: 95 % | BODY MASS INDEX: 36.45 KG/M2 | SYSTOLIC BLOOD PRESSURE: 128 MMHG | WEIGHT: 315 LBS | HEART RATE: 103 BPM | HEIGHT: 78 IN | TEMPERATURE: 97 F | DIASTOLIC BLOOD PRESSURE: 85 MMHG

## 2018-11-20 DIAGNOSIS — T45.1X5A CHEMOTHERAPY-INDUCED NEUROPATHY (H): ICD-10-CM

## 2018-11-20 DIAGNOSIS — C15.5 CANCER OF DISTAL THIRD OF ESOPHAGUS (H): Primary | ICD-10-CM

## 2018-11-20 DIAGNOSIS — R63.4 LOSS OF WEIGHT: ICD-10-CM

## 2018-11-20 DIAGNOSIS — G62.0 CHEMOTHERAPY-INDUCED NEUROPATHY (H): ICD-10-CM

## 2018-11-20 DIAGNOSIS — R10.32 LLQ ABDOMINAL PAIN: ICD-10-CM

## 2018-11-20 DIAGNOSIS — T85.848S PAIN AROUND PERCUTANEOUS ENDOSCOPIC GASTROSTOMY (PEG) TUBE SITE, SEQUELA: ICD-10-CM

## 2018-11-20 PROCEDURE — G0463 HOSPITAL OUTPT CLINIC VISIT: HCPCS | Mod: ZF

## 2018-11-20 PROCEDURE — 99214 OFFICE O/P EST MOD 30 MIN: CPT | Performed by: INTERNAL MEDICINE

## 2018-11-20 RX ORDER — GABAPENTIN 300 MG/1
CAPSULE ORAL
Qty: 180 CAPSULE | Refills: 1 | COMMUNITY
Start: 2018-11-20 | End: 2018-12-06

## 2018-11-20 RX ORDER — LIDOCAINE 50 MG/G
PATCH TOPICAL
Qty: 30 PATCH | Refills: 0 | Status: SHIPPED | OUTPATIENT
Start: 2018-11-20 | End: 2019-02-25

## 2018-11-20 RX ORDER — GABAPENTIN 300 MG/1
CAPSULE ORAL
Qty: 180 CAPSULE | Refills: 1 | COMMUNITY
Start: 2018-11-20 | End: 2018-11-20

## 2018-11-20 ASSESSMENT — PAIN SCALES - GENERAL: PAINLEVEL: MILD PAIN (2)

## 2018-11-20 NOTE — PROGRESS NOTES
"Palliative Care Outpatient Clinic Progress Note    Patient Name:  Reuben Padilla  Primary Provider:  No Ref-Primary, Physician    Chief Complaint:   Metastatic, progressive esophageal cancer  Anorexia and weight loss  Pain at G-tube site  Fatigue  Depression\grief reaction    Summary:  58 yr old ramirez with metastatic esophageal cancer diagnosed early 2017, s/p FOLFOX chemotherapy in early 2017 and switched to Taxol and Cyramza and then in March started on NCI match trial with crizotinib. Disease progression noted in July, 2018 and he as switched to taxol/cyramza but again had dz progression and on 10/23/18 started on Keytruda.  Course complicated by a PE & neuropathy.    Last Palliative care appointment: 6/2018     Reviewed: yes    Interim History:  Reuben presents today in clinic complaining of severe pain at the PEG site.  He states that the pain is so annoying and feels like there is a blade going across his skin whenever he is walking as the G-tube moves across his skin when he is walking and is very bothersome.  He tried putting a diaper rash cream around the site as per oncology recommendation and felt that this made everything worse.  He hates having the G-tube and feels that it is \"put him one step closer to the grave\".  He feels like all he can do is think about the G-tube and how he is supposed to put nutrition through it.  It gets him very angry thinking about the G-tube and that he can no longer eat.  He loved eating and is very angry that he can no longer do that.  For the pain at the PEG site he has tried oxycodone which does not help.    He also complains of pain in the knuckles of his left hand, his first 3 fingers is where the majority of the pain is.  This pain only came on since he started with the Keytruda.  Last month he was complaining of knee pain but that is resolved and he does not have any knee pain currently.  He is no longer able to ride his ATV because he does not feel that his  " "is strong enough in his left hand to hold on.  He sometimes takes ibuprofen for the knuckle pain although he knows he is not supposed to.  This does not help either and the oxycodone has not helped with the knuckle pain either.    Since the G-tube has been placed he has been having trouble sleeping at night as well because it bothers him as he is no longer able to sleep on his back and knowing that he has to put nutrients through the PEG at night bothers him emotionally.  He is taking Ambien and lorazepam for sleep but sometimes not even able to sleep with this.    He does feel his mood has gotten much worse over the past month because he does feel that he is \"one step away from the grave\".  He is in very close contact with his brother and sister and son and continues to talk to them.  He lives alone and his mood was much brighter yesterday when his brother came over and they got out of the house.  He feels tearful and sad at times and other times very angry.  He did see Dr. Lacy in the past but was dismissed as he believes Dr. Lacy thought he no longer needed to see him.  He also will not see Dr. Lacy and less the visit is scheduled for the same day as other visits as it is too far for him to drive for just one visit.  He feels annoyed that he had to come to see me today as I was his only appointment for the day and it is a very long drive.    Constipation well controlled.    Long-standing neuropathy: Taking gabapentin 600mg BID    He is very concerned that he continues to lose weight even though he is increased the G-tube feeds.  He does not understand why he is losing weight and feels angry about it.  He is upset that he has to do the G-tube feeds and does not understand why he cannot eat.  He feels like now all he does is think about how much food nutrition he needs to get through the G-tube and it is all consuming.  He knows the G-tube was placed with the hope of giving him more time but he still " wonders if it was worth it.  We talked about how the hope is that after 3 months the Keytruda is working.  I also talked about how we could stop the nutrition via the PEG at any point and remove the PEG if he decided he no longer wanted it in.    Opioid Risk Tool (ORT):    Family History of Substance Abuse:        Alcohol = 3 pts (yes, male) - father       Illegal Drugs = 0 pt (no)       Prescription Drugs = 0 pt (no)      Personal History of Substance Abuse:       Alcohol = 0 pt (no)       Illegal Drugs = 0 pt (no)       Prescription Drugs = 0 pt (no)        Age: 0 pt (age < 16; age > 45)      History of Pre-adolescent Sexual Abuse: 0 pt (no)      Psychological Disease: 0 pt (none) - currently grief rxn      ORT Score = 3        0-3: Low risk for opioid abuse       4-7: Moderate risk for opioid abuse       >/= 8: High risk for opioid abuse        Review of Systems:  ROS: 10 point ROS neg other than the symptoms noted above in the HPI         Allergies   Allergen Reactions     Penicillin G Other (See Comments)     Pt not sure-     Penicillins Unknown     Current Outpatient Prescriptions   Medication Sig Dispense Refill     acetaminophen (TYLENOL) 325 MG tablet Take 3 tablets (975 mg) by mouth every 8 hours as needed for mild pain 100 tablet      diphenoxylate-atropine (LOMOTIL) 2.5-0.025 MG per tablet Take 1 tablet by mouth 4 times daily as needed for diarrhea 40 tablet 1     enoxaparin (LOVENOX) 100 MG/ML injection Inject 1 mL (100 mg) Subcutaneous every 12 hours 42 mL 0     gabapentin (NEURONTIN) 300 MG capsule Two tablets in the am and one tablet in the afternoon and evening (Patient taking differently: 600 mg 2 times daily ) 180 capsule 1     latanoprost (XALATAN) 0.005 % ophthalmic solution Place 1 drop into both eyes At Bedtime 1 Bottle 0     lidocaine-prilocaine (EMLA) cream Apply topically as needed for moderate pain 30 g 3     LORazepam (ATIVAN) 0.5 MG tablet Take 1 tablet (0.5 mg) by mouth every 4 hours as  needed (Anxiety, Nausea/Vomiting or Sleep) 30 tablet 2     metoclopramide (REGLAN) 10 MG tablet Take 1 tablet (10 mg) by mouth 3 times daily 120 tablet 3     multivitamin, therapeutic with minerals (MULTI-VITAMIN) TABS tablet Take 1 tablet by mouth daily       omeprazole (PRILOSEC) 40 MG capsule Take 1 capsule (40 mg) by mouth daily 90 capsule 1     ondansetron (ZOFRAN) 8 MG tablet Take 1 tablet (8 mg) by mouth every 8 hours as needed (Nausea/Vomiting) 30 tablet 2     oxyCODONE IR (ROXICODONE) 10 MG tablet Take 1 tablet (10 mg) by mouth every 3 hours as needed for breakthrough pain 20 tablet 0     prochlorperazine (COMPAZINE) 10 MG tablet Take 1 tablet (10 mg) by mouth every 6 hours as needed (Nausea/Vomiting) 30 tablet 2     protein modular (PROSOURCE TF) LIQD 2 packets by Per Feeding Tube route 3 times daily 180 packet 0     timolol (TIMOPTIC) 0.5 % ophthalmic solution Place into both eyes daily       zolpidem (AMBIEN) 10 MG tablet Take 1 tablet (10 mg) by mouth nightly as needed 60 tablet 1     [DISCONTINUED] dexamethasone (DECADRON) 4 MG tablet Take two tablets daily on days 2,3 and then 1 tablet daily days 4,5 6 tablet 1     Past Medical History:   Diagnosis Date     Arrhythmia      Cancer (H)     esophageal     Glaucoma      Hypertension      Paroxysmal A-fib (H)      Tubular adenoma 02/2017     Past Surgical History:   Procedure Laterality Date     AMPUTATION      toe     ARTHROSCOPY KNEE       bunion surgery       CHOLECYSTECTOMY       COLONOSCOPY  02/2017    remove 2 polyps     ESOPHAGOSCOPY, GASTROSCOPY, DUODENOSCOPY (EGD), COMBINED N/A 10/10/2018    Procedure: COMBINED ESOPHAGOSCOPY, GASTROSCOPY, DUODENOSCOPY (EGD);  Esophagogastroduodenoscopy ;  Surgeon: Leonel Joel MD;  Location: [facility] OR     INSERT PORT VASCULAR ACCESS Right 5/9/2017    Procedure: INSERT PORT VASCULAR ACCESS;  Single Lumen Chest Power Port;  Surgeon: Jasiel Hu PA-C;  Location:  OR         /85 (BP  "Location: Right arm, Patient Position: Sitting, Cuff Size: Adult Large)  Pulse 103  Temp 97  F (36.1  C) (Oral)  Resp 16  Ht 1.981 m (6' 5.99\")  Wt 145.6 kg (321 lb)  SpO2 95%  BMI 37.1 kg/m2    General: Alert, appears slightly older than stated age, in no acute distress  Eyes: EOMI, sclera clear  HEENT: No temporal wasting, NC/AT; mucous membranes moist  Lungs: No increased work of breathing, speaking full sentences   Abdomen: Obese, G-tube site without surrounding erythema or warmth, no discharge, no significant pain to palpation around the G-tube site or of the abdomen, no rebound or guarding  Neuro: A&O x 3; CN II-XII grossly intact; gait normal  Neuropsych: Alert, good eye contact, intermittently tearful, engaged, sensorium intact      Key Data Reviewed:  GFR > 90; platelets 111      Impression:     58 yr old ramirez with metastatic esophageal cancer diagnosed early 2017, s/p FOLFOX chemotherapy in early 2017 and switched to Taxol and Cyramza and then in March started on NCI match trial with crizotinib. Disease progression noted in July, 2018 and he as switched to taxol/cyramza but again had dz progression and on 10/23/18 started on Keytruda.  Course complicated by a PE & neuropathy.  G-tube placed 2 weeks ago.  Reuben having pain at G-tube site and continues to lose weight.    Recommendations & Counseling:    Recommendations:    Pain around gtube site:  - trial of lidoderm patches covering skin where the tube touches   - I do not recommend using oxycodone for this pain as it has not been helping   Pain in knuckles mainly of left hand, first 3 fingers since starting keytruda  - increase night time dose of gabapentin. Continue 600mg (2 tabs) in the morning and increase to 900mg (3 tabs) at night before bed    Nutrition via ped tube and weight loss  - continue to try and increase nutrition by tube as tolerates.  Recommend trying to get the peg nutrition at night so that you are free during the day, can go " "out, can be active, and don't have to spend the day thinking about the gtube intake.   Reuben was very focused on how his life is changed so dramatically since placement of the G-tube.  He hates using the word \"feed\" when talking about giving himself nutrition via the G-tube.  He repeatedly stated that he feels like the G-tube was 1 more step toward the grave and while he understood that the purpose of the G-tube was to help him live longer he also felt very angry and upset about it.  I recommended following up with Dr. Almonte to discuss all the changes that he has been undergoing these past weeks.  I believe Reuben is grieving and there is likely an element of depression along with the grief.  At this time he does not want to take more medication and he did not seem very open to seeing Dr. Lacy again although he said he would consider it if it could be on the same day as his other appointments.    Follow up:  - 12/3 at 4:30pm with Dr. Dora Murphy, Palliative care    Thank you for involving us in the patient's care. 50 minutes face time over half spent counseling.  Nieves Butcher MD / Palliative Medicine / Pager [phone] / After-Hours Answering Service [phone] / Main Palliative Clinic - [phone] / [facility] Inpatient Team Consult Pager [phone] (answered 8am-430pm M-F)    "

## 2018-11-20 NOTE — NURSING NOTE
"Oncology Rooming Note    November 20, 2018 11:05 AM   Reuben Padilla is a 58 year old male who presents for:    Chief Complaint   Patient presents with     Oncology Clinic Visit     new pt consult      Initial Vitals: /85 (BP Location: Right arm, Patient Position: Sitting, Cuff Size: Adult Large)  Pulse 103  Temp 97  F (36.1  C) (Oral)  Resp 16  Ht 1.981 m (6' 5.99\")  Wt 145.6 kg (321 lb)  SpO2 95%  BMI 37.1 kg/m2 Estimated body mass index is 37.1 kg/(m^2) as calculated from the following:    Height as of this encounter: 1.981 m (6' 5.99\").    Weight as of this encounter: 145.6 kg (321 lb). Body surface area is 2.83 meters squared.  Mild Pain (2) Comment: left abdominal pain   No LMP for male patient.  Allergies reviewed: Yes  Medications reviewed: Yes    Medications: Medication refills not needed today.  Pharmacy name entered into BULX:    [id number] - [place] [place] - [address] [place] [address] [address] AT [address] & [facility] [place], [place] - [phone]  [id number] - [place], [place] - [address]    Clinical concerns: none      8 minutes for nursing intake (face to face time)     Trori DULCE Ramey              "

## 2018-11-20 NOTE — LETTER
"11/20/2018       RE: Reuben Padilla  [address] [facility] [address] [place] [address]     Dear Colleague,    Thank you for referring your patient, Reuben Padilla, to the [facility] at [facility]. Please see a copy of my visit note below.    Palliative Care Outpatient Clinic Progress Note    Patient Name:  Reuben Padilla  Primary Provider:  No Ref-Primary, Physician    Chief Complaint:   Metastatic, progressive esophageal cancer  Anorexia and weight loss  Pain at G-tube site  Fatigue  Depression\grief reaction    Summary:  58 yr old james with metastatic esophageal cancer diagnosed early 2017, s/p FOLFOX chemotherapy in early 2017 and switched to Taxol and Cyramza and then in March started on NCI match trial with crizotinib. Disease progression noted in July, 2018 and he as switched to taxol/cyramza but again had dz progression and on 10/23/18 started on Keytruda.  Course complicated by a PE & neuropathy.    Last Palliative care appointment: 6/2018     Reviewed: yes    Interim History:  Reuben presents today in clinic complaining of severe pain at the PEG site.  He states that the pain is so annoying and feels like there is a blade going across his skin whenever he is walking as the G-tube moves across his skin when he is walking and is very bothersome.  He tried putting a diaper rash cream around the site as per oncology recommendation and felt that this made everything worse.  He hates having the G-tube and feels that it is \"put him one step closer to the grave\".  He feels like all he can do is think about the G-tube and how he is supposed to put nutrition through it.  It gets him very angry thinking about the G-tube and that he can no longer eat.  He loved eating and is very angry that he can no longer do that.  For the pain at the PEG site he has tried oxycodone which does not help.    He also complains of pain in the knuckles of his left hand, his first 3 " "fingers is where the majority of the pain is.  This pain only came on since he started with the Keytruda.  Last month he was complaining of knee pain but that is resolved and he does not have any knee pain currently.  He is no longer able to ride his ATV because he does not feel that his  is strong enough in his left hand to hold on.  He sometimes takes ibuprofen for the knuckle pain although he knows he is not supposed to.  This does not help either and the oxycodone has not helped with the knuckle pain either.    Since the G-tube has been placed he has been having trouble sleeping at night as well because it bothers him as he is no longer able to sleep on his back and knowing that he has to put nutrients through the PEG at night bothers him emotionally.  He is taking Ambien and lorazepam for sleep but sometimes not even able to sleep with this.    He does feel his mood has gotten much worse over the past month because he does feel that he is \"one step away from the grave\".  He is in very close contact with his brother and sister and son and continues to talk to them.  He lives alone and his mood was much brighter yesterday when his brother came over and they got out of the house.  He feels tearful and sad at times and other times very angry.  He did see Dr. Lacy in the past but was dismissed as he believes Dr. Lacy thought he no longer needed to see him.  He also will not see Dr. Lacy and less the visit is scheduled for the same day as other visits as it is too far for him to drive for just one visit.  He feels annoyed that he had to come to see me today as I was his only appointment for the day and it is a very long drive.    Constipation well controlled.    Long-standing neuropathy: Taking gabapentin 600mg BID    He is very concerned that he continues to lose weight even though he is increased the G-tube feeds.  He does not understand why he is losing weight and feels angry about it.  He is upset " that he has to do the G-tube feeds and does not understand why he cannot eat.  He feels like now all he does is think about how much food nutrition he needs to get through the G-tube and it is all consuming.  He knows the G-tube was placed with the hope of giving him more time but he still wonders if it was worth it.  We talked about how the hope is that after 3 months the Keytruda is working.  I also talked about how we could stop the nutrition via the PEG at any point and remove the PEG if he decided he no longer wanted it in.    Opioid Risk Tool (ORT):    Family History of Substance Abuse:        Alcohol = 3 pts (yes, male) - father       Illegal Drugs = 0 pt (no)       Prescription Drugs = 0 pt (no)      Personal History of Substance Abuse:       Alcohol = 0 pt (no)       Illegal Drugs = 0 pt (no)       Prescription Drugs = 0 pt (no)        Age: 0 pt (age < 16; age > 45)      History of Pre-adolescent Sexual Abuse: 0 pt (no)      Psychological Disease: 0 pt (none) - currently grief rxn      ORT Score = 3        0-3: Low risk for opioid abuse       4-7: Moderate risk for opioid abuse       >/= 8: High risk for opioid abuse        Review of Systems:  ROS: 10 point ROS neg other than the symptoms noted above in the HPI         Allergies   Allergen Reactions     Penicillin G Other (See Comments)     Pt not sure-     Penicillins Unknown     Current Outpatient Prescriptions   Medication Sig Dispense Refill     acetaminophen (TYLENOL) 325 MG tablet Take 3 tablets (975 mg) by mouth every 8 hours as needed for mild pain 100 tablet      diphenoxylate-atropine (LOMOTIL) 2.5-0.025 MG per tablet Take 1 tablet by mouth 4 times daily as needed for diarrhea 40 tablet 1     enoxaparin (LOVENOX) 100 MG/ML injection Inject 1 mL (100 mg) Subcutaneous every 12 hours 42 mL 0     gabapentin (NEURONTIN) 300 MG capsule Two tablets in the am and one tablet in the afternoon and evening (Patient taking differently: 600 mg 2 times daily )  180 capsule 1     latanoprost (XALATAN) 0.005 % ophthalmic solution Place 1 drop into both eyes At Bedtime 1 Bottle 0     lidocaine-prilocaine (EMLA) cream Apply topically as needed for moderate pain 30 g 3     LORazepam (ATIVAN) 0.5 MG tablet Take 1 tablet (0.5 mg) by mouth every 4 hours as needed (Anxiety, Nausea/Vomiting or Sleep) 30 tablet 2     metoclopramide (REGLAN) 10 MG tablet Take 1 tablet (10 mg) by mouth 3 times daily 120 tablet 3     multivitamin, therapeutic with minerals (MULTI-VITAMIN) TABS tablet Take 1 tablet by mouth daily       omeprazole (PRILOSEC) 40 MG capsule Take 1 capsule (40 mg) by mouth daily 90 capsule 1     ondansetron (ZOFRAN) 8 MG tablet Take 1 tablet (8 mg) by mouth every 8 hours as needed (Nausea/Vomiting) 30 tablet 2     oxyCODONE IR (ROXICODONE) 10 MG tablet Take 1 tablet (10 mg) by mouth every 3 hours as needed for breakthrough pain 20 tablet 0     prochlorperazine (COMPAZINE) 10 MG tablet Take 1 tablet (10 mg) by mouth every 6 hours as needed (Nausea/Vomiting) 30 tablet 2     protein modular (PROSOURCE TF) LIQD 2 packets by Per Feeding Tube route 3 times daily 180 packet 0     timolol (TIMOPTIC) 0.5 % ophthalmic solution Place into both eyes daily       zolpidem (AMBIEN) 10 MG tablet Take 1 tablet (10 mg) by mouth nightly as needed 60 tablet 1     [DISCONTINUED] dexamethasone (DECADRON) 4 MG tablet Take two tablets daily on days 2,3 and then 1 tablet daily days 4,5 6 tablet 1     Past Medical History:   Diagnosis Date     Arrhythmia      Cancer (H)     esophageal     Glaucoma      Hypertension      Paroxysmal A-fib (H)      Tubular adenoma 02/2017     Past Surgical History:   Procedure Laterality Date     AMPUTATION      toe     ARTHROSCOPY KNEE       bunion surgery       CHOLECYSTECTOMY       COLONOSCOPY  02/2017    remove 2 polyps     ESOPHAGOSCOPY, GASTROSCOPY, DUODENOSCOPY (EGD), COMBINED N/A 10/10/2018    Procedure: COMBINED ESOPHAGOSCOPY, GASTROSCOPY, DUODENOSCOPY (EGD);   "Esophagogastroduodenoscopy ;  Surgeon: Leonel Joel MD;  Location: [facility] OR     INSERT PORT VASCULAR ACCESS Right 5/9/2017    Procedure: INSERT PORT VASCULAR ACCESS;  Single Lumen Chest Power Port;  Surgeon: Jasiel Hu PA-C;  Location: [facility] OR         /85 (BP Location: Right arm, Patient Position: Sitting, Cuff Size: Adult Large)  Pulse 103  Temp 97  F (36.1  C) (Oral)  Resp 16  Ht 1.981 m (6' 5.99\")  Wt 145.6 kg (321 lb)  SpO2 95%  BMI 37.1 kg/m2    General: Alert, appears slightly older than stated age, in no acute distress  Eyes: EOMI, sclera clear  HEENT: No temporal wasting, NC/AT; mucous membranes moist  Lungs: No increased work of breathing, speaking full sentences   Abdomen: Obese, G-tube site without surrounding erythema or warmth, no discharge, no significant pain to palpation around the G-tube site or of the abdomen, no rebound or guarding  Neuro: A&O x 3; CN II-XII grossly intact; gait normal  Neuropsych: Alert, good eye contact, intermittently tearful, engaged, sensorium intact      Key Data Reviewed:  GFR > 90; platelets 111      Impression:     58 yr old ramirez with metastatic esophageal cancer diagnosed early 2017, s/p FOLFOX chemotherapy in early 2017 and switched to Taxol and Cyramza and then in March started on NCI match trial with crizotinib. Disease progression noted in July, 2018 and he as switched to taxol/cyramza but again had dz progression and on 10/23/18 started on Keytruda.  Course complicated by a PE & neuropathy.  G-tube placed 2 weeks ago.  Reuben having pain at G-tube site and continues to lose weight.    Recommendations & Counseling:    Recommendations:    Pain around gtube site:  - trial of lidoderm patches covering skin where the tube touches   - I do not recommend using oxycodone for this pain as it has not been helping   Pain in knuckles mainly of left hand, first 3 fingers since starting keytruda  - increase night time dose of gabapentin. Continue " "600mg (2 tabs) in the morning and increase to 900mg (3 tabs) at night before bed    Nutrition via ped tube and weight loss  - continue to try and increase nutrition by tube as tolerates.  Recommend trying to get the peg nutrition at night so that you are free during the day, can go out, can be active, and don't have to spend the day thinking about the gtube intake.   Reuben was very focused on how his life is changed so dramatically since placement of the G-tube.  He hates using the word \"feed\" when talking about giving himself nutrition via the G-tube.  He repeatedly stated that he feels like the G-tube was 1 more step toward the grave and while he understood that the purpose of the G-tube was to help him live longer he also felt very angry and upset about it.  I recommended following up with Dr. Almonte to discuss all the changes that he has been undergoing these past weeks.  I believe Reuben is grieving and there is likely an element of depression along with the grief.  At this time he does not want to take more medication and he did not seem very open to seeing Dr. Lacy again although he said he would consider it if it could be on the same day as his other appointments.    Follow up:  - 12/3 at 4:30pm with Dr. Dora Murphy, Palliative care    Thank you for involving us in the patient's care. 50 minutes face time over half spent counseling.      Nieves Butcher MD      "

## 2018-11-20 NOTE — MR AVS SNAPSHOT
After Visit Summary   11/20/2018    Reuben Padilla    MRN: [id number]           Patient Information     Date Of Birth          1960        Visit Information        Provider Department      11/20/2018 11:00 AM Nieves Butcher MD [facility]        Today's Diagnoses     LLQ abdominal pain    -  1    Cancer of distal third of esophagus (H)        Pain around percutaneous endoscopic gastrostomy (PEG) tube site, sequela        Chemotherapy-induced neuropathy (H)          Care Instructions    Recommendations:    Pain around gtube site:  - trial of lidoderm patches covering skin where the tube touches   - I do not recommend using oxycodone for this pain as it has not been helping   Pain in knuckles mainly of left hand, first 3 fingers since starting keytruda  - increase gabapentin to 600mg (2 tabs) in the morning and 900mg (3 tabs) at night before bed    Nutrition via ped tube and weight loss  - continue to try and increase as tolerating.  Recommend trying to get the peg nutrition at night so that you are free during the day, can go out, can be active, and don't have to spend the day thinking about the gtube intake.     Lots of changes and uncertainly. I recommend following up with Dr. Almonte to discuss the changes and all that you are going through.     Follow up:  - 12/3 at 4:30pm with Dr. Dora Murphy, Palliative care    Reasons to Call    If you are having worsening/uncontrolled symptoms we want you to call!    You or your other physicians make any changes to medications we have prescribed.      Important Phone Numbers    Corinne Dietrich. Palliative Care RN. Answered 8 am to 4 pm Tuesday - Friday ONLY. [phone]    Appointment Line.[phone]     After hours. Will connect you with on call MD. [phone]      Nieves Butcher MD  Palliative Care Physician  Clinic Number [phone]      [facility]   [facility]  [facility] Palliative Care Team is committed to treating your pain and other symptoms. This handout is for all patients treated with opioid medications in our clinics.     What are opioid medicines?   Opioid medications are used to ease some types of pain and shortness of breath. They are sometimes called narcotics. They include: Morphine (MS Contin, Roxanol), Oxycodone (OxyContin, OxyFast, Percocet), Hydrocodone (Vicodin, Norco), Hydromorphone (Dilaudid), Fentanyl (Duragesic), Methadone (Dolophine).   Are opioid medicines safe to take?   Opioid medicines are safe when you follow the directions from your doctor or medical provider.  Opioids can cause serious side effects and become unsafe if you do not follow your instructions.   To make sure you are taking opioid medicines safely, we may ask you to bring in your pill bottles or to allow us to test your urine. Our goal is to keep you safe and to improve your ability to function & be active. If we don t think opioids are safely doing that, we will work with you to taper you off of them.   Do not drive unless your medical provider tells you it is safe.   Do not take opioids with alcohol or anxiety/sleep medicines unless your doctor tells you it is ok to do so.   Are opioids addictive?   Addiction means you crave a drug and have trouble limiting the amount you use.   Addiction is more likely to happen if you take opioids to relieve stress or to feel altered. If you or your loved one feels this way when taking opioid medicines, let your medical provider know. We may refer you for additional assessment or services if this is a concern.   Your body will get used to the opioid medicines if you take them for more than two weeks in a row. This means if you stop them suddenly, you may have withdrawal. If this occurs, you will feel uncomfortable (nausea, diarrhea, increased pain). This does not mean you are addicted. Never stop taking your pain medicines all at once  unless your medical provider tells you to. If you think you need less medicine, your medical provider will help you to safely lower your dose.   What is the safest way to store opioids?   Keep your medicines in a safe place away from children, teens, pets, and visitors. Be careful about who knows that you have these medicines.   How do I get rid of my old opioids?   Opioids should be brought to your [facility] drop-off site, or you can purchase a disposal kit from your local pharmacy. If neither of these options is available, the Food and Drug Administration recommends that you flush unused opioids down the toilet.   Do not share or sell your pain medicines. This is illegal and very dangerous. We cannot prescribe opioid pain medicines to you if do this.   How do I get refills?   Opioid medicines need signed paper prescriptions. This means it may take longer to refill than other medicines. Our clinic cannot prescribe them on weekends or at night.     Give us one week s notice when requesting a refill. This gives us the time we need to get it signed and back to you. It may be possible to mail prescriptions to you.              Follow-ups after your visit        Your next 10 appointments already scheduled     Dec 03, 2018  8:00 AM CST   [facility] with  [facility]   [facility] ([facility])    [address] [place] [address]  [address]  [facility] [address]   [phone]            Dec 03, 2018  8:30 AM CST   (Arrive by 8:15 AM)   Return Visit with Kadi Dee MD   [facility] ([facility])    [address] [place] [address]  [address]  [facility] [address]   [phone]            Dec 03, 2018 10:00 AM CST   Infusion 60 with UC ONCOLOGY INFUSION, UC 29 ATC   [facility] ([facility])    [address] [place] [address]  [address]  [facility] [address]   [phone]            Dec 03, 2018  4:30  "PM CST   (Arrive by 4:15 PM)   Return Visit with Dora Murphy MD   [facility] ([facility])    [address] [facility]  [address]  [facility] [address] [phone]              Who to contact     If you have questions or need follow up information about today's clinic visit or your schedule please contact [facility] directly at [phone].  Normal or non-critical lab and imaging results will be communicated to you by 911 Viewhart, letter or phone within 4 business days after the clinic has received the results. If you do not hear from us within 7 days, please contact the clinic through [employer] or phone. If you have a critical or abnormal lab result, we will notify you by phone as soon as possible.  Submit refill requests through OneTag or call your pharmacy and they will forward the refill request to us. Please allow 3 business days for your refill to be completed.          Additional Information About Your Visit        OneTag Information     OneTag gives you secure access to your electronic health record. If you see a primary care provider, you can also send messages to your care team and make appointments. If you have questions, please call your primary care clinic.  If you do not have a primary care provider, please call [phone] and they will assist you.        Care EveryWhere ID     This is your Care EveryWhere ID. This could be used by other organizations to access your [place] medical records  [id number]        Your Vitals Were     Pulse Temperature Respirations Height Pulse Oximetry BMI (Body Mass Index)    103 97  F (36.1  C) (Oral) 16 1.981 m (6' 5.99\") 95% 37.1 kg/m2       Blood Pressure from Last 3 Encounters:   11/20/18 128/85   11/13/18 121/78   11/13/18 96/61    Weight from Last 3 Encounters:   11/20/18 145.6 kg (321 lb)   11/13/18 (!) 154.2 kg (340 lb)   11/07/18 (!) 157.5 kg (347 lb 4.8 oz)              Today, you had " the following     No orders found for display         Today's Medication Changes          These changes are accurate as of 11/20/18 12:15 PM.  If you have any questions, ask your nurse or doctor.               Start taking these medicines.        Dose/Directions    gabapentin 300 MG capsule   Commonly known as:  NEURONTIN   Used for:  Chemotherapy-induced neuropathy (H)   Started by:  Nieves Butcher MD        2 tablets in the am and 3 tablets before bed   Quantity:  180 capsule   Refills:  1       lidocaine 5 % Patch   Commonly known as:  LIDODERM   Used for:  LLQ abdominal pain, Cancer of distal third of esophagus (H), Pain around percutaneous endoscopic gastrostomy (PEG) tube site, sequela   Started by:  Nieves Butcher MD        Apply up to 3 patches to painful area at once for up to 12 h within a 24 h period.  Remove after 12 hours.   Quantity:  30 patch   Refills:  0            Where to get your medicines      [facility] [address] [facility]  [address] [facility] [address]     Phone:  [phone]     lidocaine 5 % Patch                Primary Care Provider Fax #    Physician No Ref-Primary [phone]       No address on file        Equal Access to Services     [facility]: Hadshahana Dunlap, wajeanda luangelita, qaybta kaalmada ashleigh, geraldo washington . So [facility] [phone].    ATENCIÓN: Si habla español, tiene a alamo disposición servicios gratuitos de asistencia lingüística. [facility] [phone].    We comply with applicable federal civil rights laws and [place] laws. We do not discriminate on the basis of race, color, national origin, age, disability, sex, sexual orientation, or gender identity.            Thank you!     Thank you for choosing [facility]  for your care. Our goal is always to provide you with excellent care. Hearing back from our patients is one way we can  continue to improve our services. Please take a few minutes to complete the written survey that you may receive in the mail after your visit with us. Thank you!             Your Updated Medication List - Protect others around you: Learn how to safely use, store and throw away your medicines at www.disposemymeds.org.          This list is accurate as of 11/20/18 12:15 PM.  Always use your most recent med list.                   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)       diphenoxylate-atropine 2.5-0.025 MG per tablet    LOMOTIL    40 tablet    Take 1 tablet by mouth 4 times daily as needed for diarrhea    Diarrhea, unspecified type       * enoxaparin 150 MG/ML injection    LOVENOX     INJECT 0.86ML (130MG) TWICE A DAY FOR ACUTE PULMONARY EMBOLISM AND ESOPHAGEAL MALIGNANCY        * enoxaparin 100 MG/ML injection    LOVENOX    42 mL    Inject 1 mL (100 mg) Subcutaneous every 12 hours    Cancer of distal third of esophagus (H)       gabapentin 300 MG capsule    NEURONTIN    180 capsule    2 tablets in the am and 3 tablets before bed    Chemotherapy-induced neuropathy (H)       latanoprost 0.005 % ophthalmic solution    XALATAN    1 Bottle    Place 1 drop into both eyes At Bedtime    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  Remove after 12 hours.    LLQ abdominal pain, Cancer of distal third of esophagus (H), Pain around percutaneous endoscopic gastrostomy (PEG) tube site, sequela       lidocaine-prilocaine cream    EMLA    30 g    Apply topically as needed for moderate pain    Cancer of distal third of esophagus (H)       LORazepam 0.5 MG tablet    ATIVAN    30 tablet    Take 1 tablet (0.5 mg) by mouth  every 4 hours as needed (Anxiety, Nausea/Vomiting or Sleep)    Cancer of distal third of esophagus (H)       metoclopramide 10 MG tablet    REGLAN    120 tablet    Take 1 tablet (10 mg) by mouth 3 times daily    Gastroparesis       Multi-vitamin Tabs tablet      Take 1 tablet by mouth daily        omeprazole 40 MG capsule    priLOSEC    90 capsule    Take 1 capsule (40 mg) by mouth daily    Cancer of distal third of esophagus (H)       ondansetron 8 MG tablet    ZOFRAN    30 tablet    Take 1 tablet (8 mg) by mouth every 8 hours as needed (Nausea/Vomiting)    Cancer of distal third of esophagus (H)       oxyCODONE IR 10 MG tablet    ROXICODONE    20 tablet    Take 1 tablet (10 mg) by mouth every 3 hours as needed for breakthrough pain    Cancer of distal third of esophagus (H)       prochlorperazine 10 MG tablet    COMPAZINE    30 tablet    Take 1 tablet (10 mg) by mouth every 6 hours as needed (Nausea/Vomiting)    Cancer of distal third of esophagus (H)       protein modular Liqd     180 packet    2 packets by Per Feeding Tube route 3 times daily    Cancer of distal third of esophagus (H)       timolol 0.5 % ophthalmic solution    TIMOPTIC     Place into both eyes daily    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 Read the directions carefully, and ask your doctor or other care provider to review them with you.

## 2018-11-20 NOTE — PATIENT INSTRUCTIONS
Recommendations:    Pain around gtube site:  - trial of lidoderm patches covering skin where the tube touches   - I do not recommend using oxycodone for this pain as it has not been helping   Pain in knuckles mainly of left hand, first 3 fingers since starting keytruda  - increase gabapentin to 600mg (2 tabs) in the morning and 900mg (3 tabs) at night before bed    Nutrition via ped tube and weight loss  - continue to try and increase as tolerating.  Recommend trying to get the peg nutrition at night so that you are free during the day, can go out, can be active, and don't have to spend the day thinking about the gtube intake.     Lots of changes and uncertainly. I recommend following up with Dr. Almonte to discuss the changes and all that you are going through.     Follow up:  - 12/3 at 4:30pm with Dr. Dora Murphy, Palliative care    Reasons to Call    If you are having worsening/uncontrolled symptoms we want you to call!    You or your other physicians make any changes to medications we have prescribed.      Important Phone Numbers    Corinne Dietrich. Palliative Care RN. Answered 8 am to 4 pm Tuesday - Friday ONLY. [phone]    Appointment Line.[phone]     After hours. Will connect you with on call MD. [phone]      Nieves Butcher MD  Palliative Care Physician  Clinic Number [phone]      [facility]   The [facility] Palliative Care Team is committed to treating your pain and other symptoms. This handout is for all patients treated with opioid medications in our clinics.     What are opioid medicines?   Opioid medications are used to ease some types of pain and shortness of breath. They are sometimes called narcotics. They include: Morphine (MS Contin, Roxanol), Oxycodone (OxyContin, OxyFast, Percocet), Hydrocodone (Vicodin, Norco), Hydromorphone (Dilaudid), Fentanyl (Duragesic), Methadone (Dolophine).   Are opioid medicines safe  to take?   Opioid medicines are safe when you follow the directions from your doctor or medical provider.  Opioids can cause serious side effects and become unsafe if you do not follow your instructions.   To make sure you are taking opioid medicines safely, we may ask you to bring in your pill bottles or to allow us to test your urine. Our goal is to keep you safe and to improve your ability to function & be active. If we don t think opioids are safely doing that, we will work with you to taper you off of them.   Do not drive unless your medical provider tells you it is safe.   Do not take opioids with alcohol or anxiety/sleep medicines unless your doctor tells you it is ok to do so.   Are opioids addictive?   Addiction means you crave a drug and have trouble limiting the amount you use.   Addiction is more likely to happen if you take opioids to relieve stress or to feel altered. If you or your loved one feels this way when taking opioid medicines, let your medical provider know. We may refer you for additional assessment or services if this is a concern.   Your body will get used to the opioid medicines if you take them for more than two weeks in a row. This means if you stop them suddenly, you may have withdrawal. If this occurs, you will feel uncomfortable (nausea, diarrhea, increased pain). This does not mean you are addicted. Never stop taking your pain medicines all at once unless your medical provider tells you to. If you think you need less medicine, your medical provider will help you to safely lower your dose.   What is the safest way to store opioids?   Keep your medicines in a safe place away from children, teens, pets, and visitors. Be careful about who knows that you have these medicines.   How do I get rid of my old opioids?   Opioids should be brought to your [facility] drop-off site, or you can purchase a disposal kit from your local pharmacy. If neither of these options is available, the Food and  Drug Administration recommends that you flush unused opioids down the toilet.   Do not share or sell your pain medicines. This is illegal and very dangerous. We cannot prescribe opioid pain medicines to you if do this.   How do I get refills?   Opioid medicines need signed paper prescriptions. This means it may take longer to refill than other medicines. Our clinic cannot prescribe them on weekends or at night.     Give us one week s notice when requesting a refill. This gives us the time we need to get it signed and back to you. It may be possible to mail prescriptions to you.

## 2018-11-27 ENCOUNTER — TELEPHONE (OUTPATIENT)
Dept: ONCOLOGY | Facility: CLINIC | Age: 58
End: 2018-11-27

## 2018-11-27 ASSESSMENT — ENCOUNTER SYMPTOMS
SKIN CHANGES: 0
PALPITATIONS: 0
LIGHT-HEADEDNESS: 0
DISTURBANCES IN COORDINATION: 0
HEADACHES: 0
MUSCLE WEAKNESS: 1
FEVER: 0
TINGLING: 1
ALTERED TEMPERATURE REGULATION: 0
TREMORS: 0
DECREASED APPETITE: 0
MYALGIAS: 1
NUMBNESS: 1
SYNCOPE: 0
INCREASED ENERGY: 0
POLYDIPSIA: 0
ORTHOPNEA: 0
NECK PAIN: 0
MEMORY LOSS: 0
POLYPHAGIA: 0
SPEECH CHANGE: 0
STIFFNESS: 1
LEG PAIN: 0
WEIGHT GAIN: 0
BACK PAIN: 0
FATIGUE: 1
HYPERTENSION: 0
JOINT SWELLING: 0
LOSS OF CONSCIOUSNESS: 0
NIGHT SWEATS: 0
EXERCISE INTOLERANCE: 0
WEIGHT LOSS: 1
NAIL CHANGES: 0
HYPOTENSION: 0
SEIZURES: 0
PARALYSIS: 0
ARTHRALGIAS: 1
POOR WOUND HEALING: 0
WEAKNESS: 1
HALLUCINATIONS: 0
DIZZINESS: 0
CHILLS: 0
SLEEP DISTURBANCES DUE TO BREATHING: 0
MUSCLE CRAMPS: 0

## 2018-11-27 NOTE — TELEPHONE ENCOUNTER
Person calling: Patient    Reason for call: symptoms: pain at surgical site (Gtube placed 11/5 by Dr. Fortune). Has seen Palliative regarding pain. Lidocaine was helping temporarily but not currently helping. States pain is both internal and external. Please advise next step recommendations? See Mansi Wetzel RNBRENDA with patient specific questions.    Action taken: paged the following provider(s):  Sharita Guardado NP. [employer] message routed to Thoracic Care Coordinators and cc'd Mansi KHAN    Per Sharita: Clinic visit tomorrow with Rosy Fierro NP to assess Gtube. Hold placed on 3:20PM appt tomorrow. Confirmed appt time with patient. [email or web address] message routed to scheduling department for appointment time and FYI routed to RNCC. Encounter routed to Rosy Fierro as FYI.    Zulema Bruno RN   [facility] Triage

## 2018-11-28 ENCOUNTER — OFFICE VISIT (OUTPATIENT)
Dept: SURGERY | Facility: CLINIC | Age: 58
End: 2018-11-28
Attending: CLINICAL NURSE SPECIALIST
Payer: COMMERCIAL

## 2018-11-28 VITALS
HEIGHT: 78 IN | OXYGEN SATURATION: 97 % | WEIGHT: 315 LBS | TEMPERATURE: 98.1 F | BODY MASS INDEX: 36.45 KG/M2 | DIASTOLIC BLOOD PRESSURE: 90 MMHG | RESPIRATION RATE: 18 BRPM | SYSTOLIC BLOOD PRESSURE: 133 MMHG | HEART RATE: 93 BPM

## 2018-11-28 DIAGNOSIS — C15.9 MALIGNANT NEOPLASM OF ESOPHAGUS, UNSPECIFIED LOCATION (H): Primary | ICD-10-CM

## 2018-11-28 PROCEDURE — 40000114 ZZH STATISTIC NO CHARGE CLINIC VISIT

## 2018-11-28 PROCEDURE — G0463 HOSPITAL OUTPT CLINIC VISIT: HCPCS | Mod: ZF

## 2018-11-28 ASSESSMENT — PAIN SCALES - GENERAL: PAINLEVEL: MODERATE PAIN (5)

## 2018-11-28 NOTE — NURSING NOTE
"Oncology Rooming Note    November 28, 2018 3:36 PM   Reuben Padilla is a 58 year old male who presents for:    No chief complaint on file.    Initial Vitals: /90 (BP Location: Right arm, Patient Position: Sitting, Cuff Size: Adult Large)  Pulse 93  Temp 98.1  F (36.7  C) (Oral)  Resp 18  Ht 1.981 m (6' 5.99\")  Wt 149.5 kg (329 lb 8 oz)  SpO2 97%  BMI 38.09 kg/m2 Estimated body mass index is 38.09 kg/(m^2) as calculated from the following:    Height as of this encounter: 1.981 m (6' 5.99\").    Weight as of this encounter: 149.5 kg (329 lb 8 oz). Body surface area is 2.87 meters squared.  Moderate Pain (5) Comment: Data Unavailable   No LMP for male patient.  Allergies reviewed: Yes  Medications reviewed: Yes    Medications: Medication refills not needed today.  Pharmacy name entered into Dotted Block:    [id number] - [place] - [address] [place] [address] AT INTERSECTION [address] [place], [place] - [address]  [id number] - [place], [place] - [address] [place]    Clinical concerns: No new concerns. Alfred was notified.    10 minutes for nursing intake (face to face time)     Kadi Arevalo LPN              "

## 2018-11-28 NOTE — LETTER
11/28/2018       RE: Reuben Padilla  [address] [facility] [address]     Dear Colleague,    Thank you for referring your patient, Reuben Padilla, to the [facility]. Please see a copy of my visit note below.    REASON FOR VISIT:  Assess PEG site    PROCEDURES PERFORMED:    1.  Esophagogastroduodenoscopy.   2.  Percutaneous endoscopic gastrostomy tube placement with 2-point gastropexy.     DATE ABOVE PROCEDURES PERFORMED:  11/5/18    SURGEON:  Dr. Socorro Rajan    History of Present Illness:   Patient has stage IV esophageal adenocarcinoma with progression despite multiple lines of therapy.   He is able to only swallow minimal amounts and is using the PEG tube for all of his nutritional and hydration needs.   He is also seeing palliative care.       He complained of significant pain at the PEG site and we were asked to assess him today.    Assessment:   His abdomen is soft with no erythema noted.   There is some skin excoriation under the PEG flange which is extremely tender to minimal cleaning attempts.   The drainage is minimal and expected with these tubes.          After gentle cleansing around the site, I liberally applied a moisture barrier cream followed by gauze dressing.   A Flexi-Trac tube tamer was used to anchor the tubing to his side.   I suspect the weight of the PEG tubing downward contributed to the excoriation of skin on this side.   I suggested he always use the barrier cream (he has Desinex at home) followed by gauze dressing to help absorb any drainage and to keep the plastic flange off the skin.   There is no evidence of infection, the flange is in good position.      I am sorry I do not have any additional suggestions for him but hope that, as the skin heals and continues to be protected from further excoration, this pain will diminish.    Plan:  Return PRN    Total time:    30 minutes      Again, thank you for allowing me to participate in the care of your  patient.      Sincerely,    PADMA Reyes CNS

## 2018-11-29 NOTE — PROGRESS NOTES
REASON FOR VISIT:  Assess PEG site    PROCEDURES PERFORMED:    1.  Esophagogastroduodenoscopy.   2.  Percutaneous endoscopic gastrostomy tube placement with 2-point gastropexy.     DATE ABOVE PROCEDURES PERFORMED:  11/5/18    SURGEON:  Dr. Socorro Rajan    History of Present Illness:   Patient has stage IV esophageal adenocarcinoma with progression despite multiple lines of therapy.   He is able to only swallow minimal amounts and is using the PEG tube for all of his nutritional and hydration needs.   He is also seeing palliative care.       He complained of significant pain at the PEG site and we were asked to assess him today.    Assessment:   His abdomen is soft with no erythema noted.   There is some skin excoriation under the PEG flange which is extremely tender to minimal cleaning attempts.   The drainage is minimal and expected with these tubes.          After gentle cleansing around the site, I liberally applied a moisture barrier cream followed by gauze dressing.   A Flexi-Trac tube tamer was used to anchor the tubing to his side.   I suspect the weight of the PEG tubing downward contributed to the excoriation of skin on this side.   I suggested he always use the barrier cream (he has Desinex at home) followed by gauze dressing to help absorb any drainage and to keep the plastic flange off the skin.   There is no evidence of infection, the flange is in good position.      I am sorry I do not have any additional suggestions for him but hope that, as the skin heals and continues to be protected from further excoration, this pain will diminish.    Plan:  Return PRN    Total time:    30 minutes    PADMA Dominguez, CNS  Answers for HPI/ROS submitted by the patient on 11/27/2018   General Symptoms: Yes  Skin Symptoms: Yes  HENT Symptoms: No  EYE SYMPTOMS: No  HEART SYMPTOMS: Yes  LUNG SYMPTOMS: No  INTESTINAL SYMPTOMS: No  URINARY SYMPTOMS: No  REPRODUCTIVE SYMPTOMS: No  SKELETAL SYMPTOMS:  Yes  BLOOD SYMPTOMS: No  NERVOUS SYSTEM SYMPTOMS: Yes  MENTAL HEALTH SYMPTOMS: No  Fever: No  Loss of appetite: No  Weight loss: Yes  Weight gain: No  Fatigue: Yes  Night sweats: No  Chills: No  Increased stress: Yes  Excessive hunger: No  Excessive thirst: No  Feeling hot or cold when others believe the temperature is normal: No  Loss of height: No  Post-operative complications: Yes  Surgical site pain: Yes  Hallucinations: No  Change in or Loss of Energy: No  Hyperactivity: No  Confusion: No  Changes in hair: No  Changes in moles/birth marks: No  Itching: No  Rashes: No  Changes in nails: No  Acne: No  Change in facial hair: No  Warts: No  Non-healing sores: No  Scarring: No  Flaking of skin: No  Color changes of hands/feet in cold : No  Sun sensitivity: No  Skin thickening: No  Chest pain or pressure: No  Fast or irregular heartbeat: No  Pain in legs with walking: No  Trouble breathing while lying down: No  Fingers or toes appear blue: No  High blood pressure: No  Low blood pressure: No  Fainting: No  Murmurs: No  Pacemaker: No  Varicose veins: Yes  Edema or swelling: Yes  Wake up at night with shortness of breath: No  Light-headedness: No  Exercise intolerance: No  Back pain: No  Muscle aches: Yes  Neck pain: No  Swollen joints: No  Joint pain: Yes  Bone pain: No  Muscle cramps: No  Muscle weakness: Yes  Joint stiffness: Yes  Bone fracture: No  Trouble with coordination: No  Dizziness or trouble with balance: No  Fainting or black-out spells: No  Memory loss: No  Headache: No  Seizures: No  Speech problems: No  Tingling: Yes  Tremor: No  Weakness: Yes  Difficulty walking: No  Paralysis: No  Numbness: Yes

## 2018-12-01 ENCOUNTER — APPOINTMENT (OUTPATIENT)
Dept: LAB | Facility: CLINIC | Age: 58
End: 2018-12-01
Attending: INTERNAL MEDICINE
Payer: COMMERCIAL

## 2018-12-03 ENCOUNTER — INFUSION THERAPY VISIT (OUTPATIENT)
Dept: ONCOLOGY | Facility: CLINIC | Age: 58
End: 2018-12-03
Attending: INTERNAL MEDICINE
Payer: COMMERCIAL

## 2018-12-03 ENCOUNTER — APPOINTMENT (OUTPATIENT)
Dept: LAB | Facility: CLINIC | Age: 58
End: 2018-12-03
Attending: INTERNAL MEDICINE
Payer: COMMERCIAL

## 2018-12-03 VITALS
DIASTOLIC BLOOD PRESSURE: 81 MMHG | BODY MASS INDEX: 36.45 KG/M2 | SYSTOLIC BLOOD PRESSURE: 118 MMHG | OXYGEN SATURATION: 97 % | WEIGHT: 315 LBS | HEART RATE: 90 BPM | TEMPERATURE: 98 F | HEIGHT: 78 IN | RESPIRATION RATE: 16 BRPM

## 2018-12-03 DIAGNOSIS — T45.1X5A CHEMOTHERAPY-INDUCED NEUTROPENIA (H): Primary | ICD-10-CM

## 2018-12-03 DIAGNOSIS — C15.5 CANCER OF DISTAL THIRD OF ESOPHAGUS (H): ICD-10-CM

## 2018-12-03 DIAGNOSIS — C15.5 CANCER OF DISTAL THIRD OF ESOPHAGUS (H): Primary | ICD-10-CM

## 2018-12-03 DIAGNOSIS — D70.1 CHEMOTHERAPY-INDUCED NEUTROPENIA (H): Primary | ICD-10-CM

## 2018-12-03 LAB
ALBUMIN SERPL-MCNC: 3.2 G/DL (ref 3.4–5)
ALP SERPL-CCNC: 74 U/L (ref 40–150)
ALT SERPL W P-5'-P-CCNC: 37 U/L (ref 0–70)
ANION GAP SERPL CALCULATED.3IONS-SCNC: 7 MMOL/L (ref 3–14)
AST SERPL W P-5'-P-CCNC: 25 U/L (ref 0–45)
BASOPHILS # BLD AUTO: 0 10E9/L (ref 0–0.2)
BASOPHILS NFR BLD AUTO: 0.4 %
BILIRUB SERPL-MCNC: 0.5 MG/DL (ref 0.2–1.3)
BUN SERPL-MCNC: 16 MG/DL (ref 7–30)
CALCIUM SERPL-MCNC: 8.4 MG/DL (ref 8.5–10.1)
CHLORIDE SERPL-SCNC: 112 MMOL/L (ref 94–109)
CO2 SERPL-SCNC: 23 MMOL/L (ref 20–32)
CREAT SERPL-MCNC: 0.81 MG/DL (ref 0.66–1.25)
DIFFERENTIAL METHOD BLD: ABNORMAL
EOSINOPHIL # BLD AUTO: 0.1 10E9/L (ref 0–0.7)
EOSINOPHIL NFR BLD AUTO: 1.3 %
ERYTHROCYTE [DISTWIDTH] IN BLOOD BY AUTOMATED COUNT: 15.8 % (ref 10–15)
GFR SERPL CREATININE-BSD FRML MDRD: >90 ML/MIN/1.7M2
GLUCOSE SERPL-MCNC: 102 MG/DL (ref 70–99)
HCT VFR BLD AUTO: 40.2 % (ref 40–53)
HGB BLD-MCNC: 12.8 G/DL (ref 13.3–17.7)
IMM GRANULOCYTES # BLD: 0 10E9/L (ref 0–0.4)
IMM GRANULOCYTES NFR BLD: 0.2 %
LYMPHOCYTES # BLD AUTO: 1.4 10E9/L (ref 0.8–5.3)
LYMPHOCYTES NFR BLD AUTO: 30.3 %
MCH RBC QN AUTO: 29 PG (ref 26.5–33)
MCHC RBC AUTO-ENTMCNC: 31.8 G/DL (ref 31.5–36.5)
MCV RBC AUTO: 91 FL (ref 78–100)
MONOCYTES # BLD AUTO: 0.4 10E9/L (ref 0–1.3)
MONOCYTES NFR BLD AUTO: 8.2 %
NEUTROPHILS # BLD AUTO: 2.7 10E9/L (ref 1.6–8.3)
NEUTROPHILS NFR BLD AUTO: 59.6 %
NRBC # BLD AUTO: 0 10*3/UL
NRBC BLD AUTO-RTO: 0 /100
PLATELET # BLD AUTO: 127 10E9/L (ref 150–450)
POTASSIUM SERPL-SCNC: 4.1 MMOL/L (ref 3.4–5.3)
PROT SERPL-MCNC: 7 G/DL (ref 6.8–8.8)
RBC # BLD AUTO: 4.42 10E12/L (ref 4.4–5.9)
SODIUM SERPL-SCNC: 142 MMOL/L (ref 133–144)
TSH SERPL DL<=0.005 MIU/L-ACNC: 3.79 MU/L (ref 0.4–4)
WBC # BLD AUTO: 4.5 10E9/L (ref 4–11)

## 2018-12-03 PROCEDURE — 85025 COMPLETE CBC W/AUTO DIFF WBC: CPT | Performed by: INTERNAL MEDICINE

## 2018-12-03 PROCEDURE — 84443 ASSAY THYROID STIM HORMONE: CPT | Performed by: INTERNAL MEDICINE

## 2018-12-03 PROCEDURE — G0463 HOSPITAL OUTPT CLINIC VISIT: HCPCS | Mod: ZF

## 2018-12-03 PROCEDURE — 80053 COMPREHEN METABOLIC PANEL: CPT | Performed by: INTERNAL MEDICINE

## 2018-12-03 PROCEDURE — 99214 OFFICE O/P EST MOD 30 MIN: CPT | Mod: ZP | Performed by: INTERNAL MEDICINE

## 2018-12-03 PROCEDURE — 25000128 H RX IP 250 OP 636: Mod: ZF | Performed by: INTERNAL MEDICINE

## 2018-12-03 PROCEDURE — 96413 CHEMO IV INFUSION 1 HR: CPT

## 2018-12-03 RX ORDER — LORAZEPAM 2 MG/ML
0.5 INJECTION INTRAMUSCULAR EVERY 4 HOURS PRN
Status: CANCELLED
Start: 2018-12-03

## 2018-12-03 RX ORDER — EPINEPHRINE 1 MG/ML
0.3 INJECTION, SOLUTION INTRAMUSCULAR; SUBCUTANEOUS EVERY 5 MIN PRN
Status: CANCELLED | OUTPATIENT
Start: 2018-12-03

## 2018-12-03 RX ORDER — HEPARIN SODIUM (PORCINE) LOCK FLUSH IV SOLN 100 UNIT/ML 100 UNIT/ML
5 SOLUTION INTRAVENOUS
Status: COMPLETED | OUTPATIENT
Start: 2018-12-03 | End: 2018-12-03

## 2018-12-03 RX ORDER — EPINEPHRINE 0.3 MG/.3ML
0.3 INJECTION SUBCUTANEOUS EVERY 5 MIN PRN
Status: CANCELLED | OUTPATIENT
Start: 2018-12-03

## 2018-12-03 RX ORDER — METHYLPREDNISOLONE SODIUM SUCCINATE 125 MG/2ML
125 INJECTION, POWDER, LYOPHILIZED, FOR SOLUTION INTRAMUSCULAR; INTRAVENOUS
Status: CANCELLED
Start: 2018-12-03

## 2018-12-03 RX ORDER — ALBUTEROL SULFATE 90 UG/1
1-2 AEROSOL, METERED RESPIRATORY (INHALATION)
Status: CANCELLED
Start: 2018-12-03

## 2018-12-03 RX ORDER — HEPARIN SODIUM (PORCINE) LOCK FLUSH IV SOLN 100 UNIT/ML 100 UNIT/ML
500 SOLUTION INTRAVENOUS ONCE
Status: CANCELLED
Start: 2018-12-03 | End: 2018-12-04

## 2018-12-03 RX ORDER — HEPARIN SODIUM (PORCINE) LOCK FLUSH IV SOLN 100 UNIT/ML 100 UNIT/ML
500 SOLUTION INTRAVENOUS ONCE
Status: COMPLETED | OUTPATIENT
Start: 2018-12-03 | End: 2018-12-03

## 2018-12-03 RX ORDER — DIPHENHYDRAMINE HYDROCHLORIDE 50 MG/ML
50 INJECTION INTRAMUSCULAR; INTRAVENOUS
Status: CANCELLED
Start: 2018-12-03

## 2018-12-03 RX ORDER — MEPERIDINE HYDROCHLORIDE 25 MG/ML
25 INJECTION INTRAMUSCULAR; INTRAVENOUS; SUBCUTANEOUS EVERY 30 MIN PRN
Status: CANCELLED | OUTPATIENT
Start: 2018-12-03

## 2018-12-03 RX ORDER — ALBUTEROL SULFATE 0.83 MG/ML
2.5 SOLUTION RESPIRATORY (INHALATION)
Status: CANCELLED | OUTPATIENT
Start: 2018-12-03

## 2018-12-03 RX ORDER — SODIUM CHLORIDE 9 MG/ML
1000 INJECTION, SOLUTION INTRAVENOUS CONTINUOUS PRN
Status: CANCELLED
Start: 2018-12-03

## 2018-12-03 RX ADMIN — SODIUM CHLORIDE, PRESERVATIVE FREE 5 ML: 5 INJECTION INTRAVENOUS at 08:37

## 2018-12-03 RX ADMIN — SODIUM CHLORIDE, PRESERVATIVE FREE 500 UNITS: 5 INJECTION INTRAVENOUS at 10:59

## 2018-12-03 RX ADMIN — SODIUM CHLORIDE 250 ML: 9 INJECTION, SOLUTION INTRAVENOUS at 09:59

## 2018-12-03 RX ADMIN — SODIUM CHLORIDE 200 MG: 9 INJECTION, SOLUTION INTRAVENOUS at 10:26

## 2018-12-03 ASSESSMENT — PAIN SCALES - GENERAL
PAINLEVEL: MILD PAIN (3)
PAINLEVEL: MILD PAIN (3)

## 2018-12-03 NOTE — PROGRESS NOTES
Infusion Nursing Note:  Reuben Padilla presents today for C3D1 Keytruda.    Patient seen by provider today: Yes: Uma Dee MD   present during visit today: Not Applicable.    Note: Patient feels well. No complaints made. Otherwise well.    Appointment is not made by the time patient left the facility. Instructed patient if unable to see to call . Verbalized understanding.    Intravenous Access:  Implanted Port.    Treatment Conditions:  Lab Results   Component Value Date    HGB 12.8 12/03/2018     Lab Results   Component Value Date    WBC 4.5 12/03/2018      Lab Results   Component Value Date    ANEU 2.7 12/03/2018     Lab Results   Component Value Date     12/03/2018      Lab Results   Component Value Date     12/03/2018                   Lab Results   Component Value Date    POTASSIUM 4.1 12/03/2018           Lab Results   Component Value Date    MAG 1.9 11/07/2018            Lab Results   Component Value Date    CR 0.81 12/03/2018                   Lab Results   Component Value Date    NIDHI 8.4 12/03/2018                Lab Results   Component Value Date    BILITOTAL 0.5 12/03/2018           Lab Results   Component Value Date    ALBUMIN 3.2 12/03/2018                    Lab Results   Component Value Date    ALT 37 12/03/2018           Lab Results   Component Value Date    AST 25 12/03/2018       Results reviewed, labs MET treatment parameters, ok to proceed with treatment.      Post Infusion Assessment:  Patient tolerated infusion without incident.  Blood return noted pre and post infusion.  Site patent and intact, free from redness, edema or discomfort.  No evidence of extravasations.  Access discontinued per protocol.    Discharge Plan:   Patient declined prescription refills.  Discharge instructions reviewed with: Patient and Family.  Patient and/or family verbalized understanding of discharge instructions and all questions answered.  AVS to patient via [employer].  Patient will  call to return on 12/17/18 for next appointment.   Patient discharged in stable condition accompanied by: self and son.  Departure Mode: Ambulatory.    KAMI YOUNG RN

## 2018-12-03 NOTE — PROGRESS NOTES
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 His tumor is positive for cytokeratin 20, negative for P63 and CK7, and HER2 is negative. He started on FOLFOX (5FU/oxaliplatin) on 5/15/2017. He had a delay in treatment from 9/5-10/2/17 due to work related injury.    He had an excellent response by imaging throughout the summer 2017.    He a mixed response by imaging in late fall 2017.    In January 2018, he was switched to taxol and cyramza - had slight progression and neuropathy and clinical trial became available.       In March 2018, he was started on the [facility] Match Clinical Trial with crizotinib.  (MET amplification) He remained on crizotinib from March - July 2018.    August 2018, he was switched back to taxol/cramza.  In October, he was switched to Keytruda (PD1 overexpressor).    INTERVAL HISTORY: Levi comes in today for followup. He is now on Keytruda.  He reports that he is due for cycle 3 today.      He has had no issues with fevers or chills, no chest pain or shortness of breath, no nausea, vomiting, diarrhea or constipation.  He does report arthralgias and myalgias.  These were particularly in his shoulders, his hands and in his knees.  He notices some mild discomfort.  There is no erythema or swelling over the joints.      He reports that he continues to use tube feeds.  However, overall thinks that his swallowing is better.  He did not used tube feeds the last 2 days and feels that he has been maintaining good p.o. intake.  He has had breakfast cereal for multiple days in a row.  He has had one bite of steak and he is reluctant to try more of it and is wondering whether or not he should try this.      His energy levels are good.  He is aware that he has been losing weight.  This is stable  "over the course of the last month.      He was seen by Palliative Care and did not find this appointment helpful.  He remains primarily more at home as the weather has become cold.  Hikes to go four-wheeling and this has been more difficult as the temperatures have gotten colder.      He is here with his son today.  His sister has also called into the meeting.           PHYSICAL EXAMINATION  /81  Pulse 90  Temp 98  F (36.7  C) (Oral)  Resp 16  Ht 1.981 m (6' 5.99\")  Wt 149.8 kg (330 lb 4 oz)  SpO2 97%  BMI 38.17 kg/m2   Wt Readings from Last 4 Encounters:   12/03/18 149.8 kg (330 lb 4 oz)   11/28/18 149.5 kg (329 lb 8 oz)   11/20/18 145.6 kg (321 lb)   11/13/18 (!) 154.2 kg (340 lb)     Constitutional: Alert, oriented male in no visible distress.  Eyes: PERRL. Anicteric sclerae.  ENT/Mouth: OM moist and pink without lesions or thrush.  CV: RRR  Resp: CTAB throughout  Abdomen: Soft, non-tender, non-distended. Obese. Bowel sounds present. Unable to palpate liver or spleen.  Gi tube in upper quadrant of L abdomen  Extremities: trace edema , no erythema or warmth over joints  Skin: Warm, dry.   Lymph: No cervical or supraclavicular lymphadenopathy appreciated.   Neuro: CN II-XII grossly intact.       ECOG PS: 1       Lab Results   Component Value Date    WBC 4.5 12/03/2018     Lab Results   Component Value Date    RBC 4.42 12/03/2018     Lab Results   Component Value Date    HGB 12.8 12/03/2018     Lab Results   Component Value Date    HCT 40.2 12/03/2018     No components found for: MCT  Lab Results   Component Value Date    MCV 91 12/03/2018     Lab Results   Component Value Date    MCH 29.0 12/03/2018     Lab Results   Component Value Date    MCHC 31.8 12/03/2018     Lab Results   Component Value Date    RDW 15.8 12/03/2018     Lab Results   Component Value Date     12/03/2018       Recent Labs   Lab Test  12/03/18   0842  11/13/18   0928   NA  142  138   POTASSIUM  4.1  4.6   CHLORIDE  112*  106 "   CO2  23  26   ANIONGAP  7  6   GLC  102*  115*   BUN  16  22   CR  0.81  0.87   NIDHI  8.4*  8.9     Liver Function Studies -   Recent Labs   Lab Test  12/03/18   0842   PROTTOTAL  7.0   ALBUMIN  3.2*   BILITOTAL  0.5   ALKPHOS  74   AST  25   ALT  37         IMPRESSION/PLAN:  1. Metastatic esophogeal adenocarcinoma. He has had treatment with FOLFOX and then transitioned to Taxol with ramicurimab.  He was then on crizotinib on the [employer] Match study.  He progressed on this and returned on taxol/cyramza. And is now on Keytruda.    I am very pleased with how he is doing on Keytruda.  Will plan to repeat imaging in 2-3 weeks.    We discussed that he was screened for entrectinib and does not have identifiable mutations for this medication.    Other treatment options would include possible irinotecan or consideration of a phase 1 study.     A history of PE.  He is on Lovenox twice daily.  He has progressed through Xarelto in the past.      He does have baseline neuropathy and is on gabapentin 600/300/600 mg daily.         Dysphagia with associated gastroparesis by endoscopy. Overall improving.       He understands his therapies are not curative intent but rather palliative.  Additional supportive care measures are discussed.         Kadi Dee M.D.    Hematology and Oncology  [facility]  [phone]

## 2018-12-03 NOTE — NURSING NOTE
"Chief Complaint   Patient presents with     Oncology Clinic Visit     [facility] RETURN- ESOPHAGEAL CA     Port Draw     labs drawn from port by rn.  vs taken     Port accessed with 20 gauge 3/4\" gripper needle and labs drawn by rn.  Port flushed with NS and heparin.  Pt tolerated well.  VS taken.  Pt checked in for next appt.  Faye Solano RN    "

## 2018-12-03 NOTE — MR AVS SNAPSHOT
After Visit Summary   12/3/2018    Reuben Padilla    MRN: [id number]           Patient Information     Date Of Birth          1960        Visit Information        Provider Department      12/3/2018 10:00 AM  29 AT[facility];  ONCOLOGY INFUSION [facility]        Today's Diagnoses     Chemotherapy-induced neutropenia (H)    -  1    Cancer of distal third of esophagus (H)          Care Instructions    Contact Numbers  [facility]: [phone]    After Hours:  [phone]  Triage: [phone]    Please call the [facility] Triage line if you experience a temperature greater than or equal to 100.5, shaking chills, have uncontrolled nausea, vomiting and/or diarrhea, dizziness, shortness of breath, chest pain, bleeding, unexplained bruising, or if you have any other new/concerning symptoms, questions or concerns.     If it is after hours, weekends, or holidays, please call the main hospital  at  [phone] and ask to speak to the Oncology doctor on call.     If you are having any concerning symptoms or wish to speak to a provider before your next infusion visit, please call your care coordinator or triage to notify them so we can adequately serve you.     If you need a refill on a narcotic prescription or other medication, please call triage before your infusion appointment.               Follow-ups after your visit        Future tests that were ordered for you today     Open Future Orders        Priority Expected Expires Ordered    CT Chest/Abdomen/Pelvis w Contrast Routine  7/1/2019 12/3/2018            Who to contact     If you have questions or need follow up information about today's clinic visit or your schedule please contact [facility] directly at [phone].  Normal or non-critical lab and imaging results will be communicated to you by MyChart, letter or phone within 4 business days after the clinic has received the results. If you do not  hear from us within 7 days, please contact the clinic through [employer] or phone. If you have a critical or abnormal lab result, we will notify you by phone as soon as possible.  Submit refill requests through [employer] or call your pharmacy and they will forward the refill request to us. Please allow 3 business days for your refill to be completed.          Additional Information About Your Visit        [employer] Information     [employer] gives you secure access to your electronic health record. If you see a primary care provider, you can also send messages to your care team and make appointments. If you have questions, please call your primary care clinic.  If you do not have a primary care provider, please call [phone] and they will assist you.        Care EveryWhere ID     This is your Care EveryWhere ID. This could be used by other organizations to access your [place] medical records  [id number]         Blood Pressure from Last 3 Encounters:   12/03/18 118/81   11/28/18 133/90   11/20/18 128/85    Weight from Last 3 Encounters:   12/03/18 149.8 kg (330 lb 4 oz)   11/28/18 149.5 kg (329 lb 8 oz)   11/20/18 145.6 kg (321 lb)              We Performed the Following     CBC with platelets differential     Comprehensive metabolic panel     TSH with free T4 reflex        Primary Care Provider Fax #    Physician No Ref-Primary [phone]       No address on file        Equal Access to Services     OLLIE SHARPE : Hadii aad ku hadasho Sojose antonioali, waaxda luqadaha, qaybta kaalmada adeegyada, geraldo [place] . So [facility] [phone].    ATENCIÓN: Si habla español, tiene a alamo disposición servicios gratuitos de asistencia lingüística. Llame al [phone].    We comply with applicable federal civil rights laws and [place] laws. We do not discriminate on the basis of race, color, national origin, age, disability, sex, sexual orientation, or gender identity.            Thank you!     Thank you for  [facility]  for your care. Our goal is always to provide you with excellent care. Hearing back from our patients is one way we can continue to improve our services. Please take a few minutes to complete the written survey that you may receive in the mail after your visit with us. Thank you!             Your Updated Medication List - Protect others around you: Learn how to safely use, store and throw away your medicines at www.disposemymeds.org.          This list is accurate as of 12/3/18 11:28 AM.  Always use your most recent med list.                   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)       diphenoxylate-atropine 2.5-0.025 MG tablet    LOMOTIL    40 tablet    Take 1 tablet by mouth 4 times daily as needed for diarrhea    Diarrhea, unspecified type       * enoxaparin 150 MG/ML syringe    LOVENOX     INJECT 0.86ML (130MG) TWICE A DAY FOR ACUTE PULMONARY EMBOLISM AND ESOPHAGEAL MALIGNANCY        * enoxaparin 100 MG/ML syringe    LOVENOX    42 mL    Inject 1 mL (100 mg) Subcutaneous every 12 hours    Cancer of distal third of esophagus (H)       gabapentin 300 MG capsule    NEURONTIN    180 capsule    2 tablets in the am and 3 tablets before bed    Chemotherapy-induced neuropathy (H)       latanoprost 0.005 % ophthalmic solution    XALATAN    1 Bottle    Place 1 drop into both eyes At Bedtime    Cancer of distal third of esophagus (H)       lidocaine 5 % patch    LIDODERM    30 patch    Apply up to 3 patches to painful area at once for up to 12 h within a 24 h period.  Remove after 12 hours.    LLQ abdominal pain, Cancer of distal third of esophagus (H), Pain around percutaneous endoscopic gastrostomy (PEG) tube site, sequela       lidocaine-prilocaine 2.5-2.5 % external cream    EMLA    30 g    Apply  topically as needed for moderate pain    Cancer of distal third of esophagus (H)       LORazepam 0.5 MG tablet    ATIVAN    30 tablet    Take 1 tablet (0.5 mg) by mouth every 4 hours as needed (Anxiety, Nausea/Vomiting or Sleep)    Cancer of distal third of esophagus (H)       metoclopramide 10 MG tablet    REGLAN    120 tablet    Take 1 tablet (10 mg) by mouth 3 times daily    Gastroparesis       Multi-vitamin tablet      Take 1 tablet by mouth daily        omeprazole 40 MG DR capsule    priLOSEC    90 capsule    Take 1 capsule (40 mg) by mouth daily    Cancer of distal third of esophagus (H)       ondansetron 8 MG tablet    ZOFRAN    30 tablet    Take 1 tablet (8 mg) by mouth every 8 hours as needed (Nausea/Vomiting)    Cancer of distal third of esophagus (H)       oxyCODONE IR 10 MG tablet    ROXICODONE    20 tablet    Take 1 tablet (10 mg) by mouth every 3 hours as needed for breakthrough pain    Cancer of distal third of esophagus (H)       prochlorperazine 10 MG tablet    COMPAZINE    30 tablet    Take 1 tablet (10 mg) by mouth every 6 hours as needed (Nausea/Vomiting)    Cancer of distal third of esophagus (H)       protein modular Liqd     180 packet    2 packets by Per Feeding Tube route 3 times daily    Cancer of distal third of esophagus (H)       timolol maleate 0.5 % ophthalmic solution    TIMOPTIC     Place into both eyes daily    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 Read the directions carefully, and ask your doctor or other care provider to review them with you.

## 2018-12-03 NOTE — NURSING NOTE
"Oncology Rooming Note    December 3, 2018 8:49 AM   Reuben Padilla is a 58 year old male who presents for:    Chief Complaint   Patient presents with     Oncology Clinic Visit     [facility] RETURN- ESOPHAGEAL CA     Port Draw     labs drawn from port by rn.  vs taken     Initial Vitals: /81  Pulse 90  Temp 98  F (36.7  C) (Oral)  Resp 16  Ht 1.981 m (6' 5.99\")  Wt 149.8 kg (330 lb 4 oz)  SpO2 97%  BMI 38.17 kg/m2 Estimated body mass index is 38.17 kg/(m^2) as calculated from the following:    Height as of this encounter: 1.981 m (6' 5.99\").    Weight as of this encounter: 149.8 kg (330 lb 4 oz). Body surface area is 2.87 meters squared.  Mild Pain (3) Comment: Data Unavailable   No LMP for male patient.  Allergies reviewed: Yes  Medications reviewed: Yes    Medications: Medication refills not needed today.  Pharmacy name entered into [employer]:    [id number] - [place] [place] - [address] [place] [address] AT INTERSECTION [address] [place], [place] - [address]  [id number] - [place], [place] - [address]    Clinical concerns: No new concerns. Leila was notified.    10 minutes for nursing intake (face to face time)     Haider Meraz LPN            "

## 2018-12-03 NOTE — PATIENT INSTRUCTIONS
Contact Numbers  [facility]: [phone]    After Hours:  [phone]  Triage: [phone]    Please call the [facility] Triage line if you experience a temperature greater than or equal to 100.5, shaking chills, have uncontrolled nausea, vomiting and/or diarrhea, dizziness, shortness of breath, chest pain, bleeding, unexplained bruising, or if you have any other new/concerning symptoms, questions or concerns.     If it is after hours, weekends, or holidays, please call the main hospital  at  [phone] and ask to speak to the Oncology doctor on call.     If you are having any concerning symptoms or wish to speak to a provider before your next infusion visit, please call your care coordinator or triage to notify them so we can adequately serve you.     If you need a refill on a narcotic prescription or other medication, please call triage before your infusion appointment.       
absent

## 2018-12-03 NOTE — MR AVS SNAPSHOT
After Visit Summary   12/3/2018    Reuben Padilla    MRN: [id number]           Patient Information     Date Of Birth          1960        Visit Information        Provider Department      12/3/2018 8:30 AM Kadi eDe MD [facility]        Today's Diagnoses     Cancer of distal third of esophagus (H)    -  1       Follow-ups after your visit        Future tests that were ordered for you today     Open Future Orders        Priority Expected Expires Ordered    CT Chest/Abdomen/Pelvis w Contrast Routine  7/1/2019 12/3/2018            Who to contact     If you have questions or need follow up information about today's clinic visit or your schedule please contact [facility] directly at [phone].  Normal or non-critical lab and imaging results will be communicated to you by [facility], letter or phone within 4 business days after the clinic has received the results. If you do not hear from us within 7 days, please contact the clinic through [facility] [facility] or phone. If you have a critical or abnormal lab result, we will notify you by phone as soon as possible.  Submit refill requests through [employer] or call your pharmacy and they will forward the refill request to us. Please allow 3 business days for your refill to be completed.          Additional Information About Your Visit        MyChart Information     [employer] gives you secure access to your electronic health record. If you see a primary care provider, you can also send messages to your care team and make appointments. If you have questions, please call your primary care clinic.  If you do not have a primary care provider, please call [phone] and they will assist you.        Care EveryWhere ID     This is your Care EveryWhere ID. This could be used by other organizations to access your [facility] medical records  [id number]        Your Vitals Were     Pulse Temperature Respirations Height Pulse  "Oximetry BMI (Body Mass Index)    90 98  F (36.7  C) (Oral) 16 1.981 m (6' 5.99\") 97% 38.17 kg/m2       Blood Pressure from Last 3 Encounters:   12/03/18 118/81   11/28/18 133/90   11/20/18 128/85    Weight from Last 3 Encounters:   12/03/18 149.8 kg (330 lb 4 oz)   11/28/18 149.5 kg (329 lb 8 oz)   11/20/18 145.6 kg (321 lb)               Primary Care Provider Fax #    Physician No Ref-Primary [phone]       No address on file        Equal Access to Services     [facility] : Eliza Dunlap, yeyo davis, nancy sotelo, geraldo cardenas. [facility] [phone].    ATENCIÓN: Si habla español, tiene a alamo disposición servicios gratuitos de asistencia lingüística. [facility] [phone].    We comply with applicable federal civil rights laws and [place] laws. We do not discriminate on the basis of race, color, national origin, age, disability, sex, sexual orientation, or gender identity.            Thank you!     Thank you for choosing [facility]  for your care. Our goal is always to provide you with excellent care. Hearing back from our patients is one way we can continue to improve our services. Please take a few minutes to complete the written survey that you may receive in the mail after your visit with us. Thank you!             Your Updated Medication List - Protect others around you: Learn how to safely use, store and throw away your medicines at www.disposemymeds.org.          This list is accurate as of 12/3/18  3:29 PM.  Always use your most recent med list.                   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)       diphenoxylate-atropine 2.5-0.025 MG tablet    LOMOTIL    40 tablet    Take 1 tablet by mouth 4 times daily as needed for diarrhea    " Diarrhea, unspecified type       * enoxaparin 150 MG/ML syringe    LOVENOX     INJECT 0.86ML (130MG) TWICE A DAY FOR ACUTE PULMONARY EMBOLISM AND ESOPHAGEAL MALIGNANCY        * enoxaparin 100 MG/ML syringe    LOVENOX    42 mL    Inject 1 mL (100 mg) Subcutaneous every 12 hours    Cancer of distal third of esophagus (H)       gabapentin 300 MG capsule    NEURONTIN    180 capsule    2 tablets in the am and 3 tablets before bed    Chemotherapy-induced neuropathy (H)       latanoprost 0.005 % ophthalmic solution    XALATAN    1 Bottle    Place 1 drop into both eyes At Bedtime    Cancer of distal third of esophagus (H)       lidocaine 5 % patch    LIDODERM    30 patch    Apply up to 3 patches to painful area at once for up to 12 h within a 24 h period.  Remove after 12 hours.    LLQ abdominal pain, Cancer of distal third of esophagus (H), Pain around percutaneous endoscopic gastrostomy (PEG) tube site, sequela       lidocaine-prilocaine 2.5-2.5 % external cream    EMLA    30 g    Apply topically as needed for moderate pain    Cancer of distal third of esophagus (H)       LORazepam 0.5 MG tablet    ATIVAN    30 tablet    Take 1 tablet (0.5 mg) by mouth every 4 hours as needed (Anxiety, Nausea/Vomiting or Sleep)    Cancer of distal third of esophagus (H)       metoclopramide 10 MG tablet    REGLAN    120 tablet    Take 1 tablet (10 mg) by mouth 3 times daily    Gastroparesis       Multi-vitamin tablet      Take 1 tablet by mouth daily        omeprazole 40 MG DR capsule    priLOSEC    90 capsule    Take 1 capsule (40 mg) by mouth daily    Cancer of distal third of esophagus (H)       ondansetron 8 MG tablet    ZOFRAN    30 tablet    Take 1 tablet (8 mg) by mouth every 8 hours as needed (Nausea/Vomiting)    Cancer of distal third of esophagus (H)       oxyCODONE IR 10 MG tablet    ROXICODONE    20 tablet    Take 1 tablet (10 mg) by mouth every 3 hours as needed for breakthrough pain    Cancer of distal third of esophagus (H)        prochlorperazine 10 MG tablet    COMPAZINE    30 tablet    Take 1 tablet (10 mg) by mouth every 6 hours as needed (Nausea/Vomiting)    Cancer of distal third of esophagus (H)       protein modular Liqd     180 packet    2 packets by Per Feeding Tube route 3 times daily    Cancer of distal third of esophagus (H)       timolol maleate 0.5 % ophthalmic solution    TIMOPTIC     Place into both eyes daily    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 Read the directions carefully, and ask your doctor or other care provider to review them with you.

## 2018-12-03 NOTE — LETTER
12/3/2018       RE: Reuben Padilla  [address] [place] [id number]     Dear Colleague,    Thank you for referring your patient, Reuben Padilla, to the [facility]. Please see a copy of my visit note below.    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 His tumor is positive for cytokeratin 20, negative for P63 and CK7, and HER2 is negative. He started on FOLFOX (5FU/oxaliplatin) on 5/15/2017. He had a delay in treatment from 9/5-10/2/17 due to work related injury.    He had an excellent response by imaging throughout the summer 2017.    He a mixed response by imaging in late fall 2017.    In January 2018, he was switched to taxol and cyramza - had slight progression and neuropathy and clinical trial became available.       In March 2018, he was started on the [facility] Match Clinical Trial with crizotinib.  (MET amplification) He remained on crizotinib from March - July 2018.    August 2018, he was switched back to taxol/cramza.  In October, he was switched to Keytruda (PD1 overexpressor).    INTERVAL HISTORY: Levi comes in today for followup. He is now on Keytruda.  He reports that he is due for cycle 3 today.      He has had no issues with fevers or chills, no chest pain or shortness of breath, no nausea, vomiting, diarrhea or constipation.  He does report arthralgias and myalgias.  These were particularly in his shoulders, his hands and in his knees.  He notices some mild discomfort.  There is no erythema or swelling over the joints.      He reports that he continues to use tube feeds.  However, overall thinks that his swallowing is better.  He did not used tube feeds the last 2 days and feels that he has been maintaining good p.o. intake.  He has had breakfast cereal  "for multiple days in a row.  He has had one bite of steak and he is reluctant to try more of it and is wondering whether or not he should try this.      His energy levels are good.  He is aware that he has been losing weight.  This is stable over the course of the last month.      He was seen by Palliative Care and did not find this appointment helpful.  He remains primarily more at home as the weather has become cold.  Hikes to go four-wheeling and this has been more difficult as the temperatures have gotten colder.      He is here with his son today.  His sister has also called into the meeting.           PHYSICAL EXAMINATION  /81  Pulse 90  Temp 98  F (36.7  C) (Oral)  Resp 16  Ht 1.981 m (6' 5.99\")  Wt 149.8 kg (330 lb 4 oz)  SpO2 97%  BMI 38.17 kg/m2   Wt Readings from Last 4 Encounters:   12/03/18 149.8 kg (330 lb 4 oz)   11/28/18 149.5 kg (329 lb 8 oz)   11/20/18 145.6 kg (321 lb)   11/13/18 (!) 154.2 kg (340 lb)     Constitutional: Alert, oriented male in no visible distress.  Eyes: PERRL. Anicteric sclerae.  ENT/Mouth: OM moist and pink without lesions or thrush.  CV: RRR  Resp: CTAB throughout  Abdomen: Soft, non-tender, non-distended. Obese. Bowel sounds present. Unable to palpate liver or spleen.  Gi tube in upper quadrant of L abdomen  Extremities: trace edema , no erythema or warmth over joints  Skin: Warm, dry.   Lymph: No cervical or supraclavicular lymphadenopathy appreciated.   Neuro: CN II-XII grossly intact.       ECOG PS: 1       Lab Results   Component Value Date    WBC 4.5 12/03/2018     Lab Results   Component Value Date    RBC 4.42 12/03/2018     Lab Results   Component Value Date    HGB 12.8 12/03/2018     Lab Results   Component Value Date    HCT 40.2 12/03/2018     No components found for: MCT  Lab Results   Component Value Date    MCV 91 12/03/2018     Lab Results   Component Value Date    MCH 29.0 12/03/2018     Lab Results   Component Value Date    MCHC 31.8 12/03/2018 "     Lab Results   Component Value Date    RDW 15.8 12/03/2018     Lab Results   Component Value Date     12/03/2018       Recent Labs   Lab Test  12/03/18   0842  11/13/18   0928   NA  142  138   POTASSIUM  4.1  4.6   CHLORIDE  112*  106   CO2  23  26   ANIONGAP  7  6   GLC  102*  115*   BUN  16  22   CR  0.81  0.87   NIDHI  8.4*  8.9     Liver Function Studies -   Recent Labs   Lab Test  12/03/18   0842   PROTTOTAL  7.0   ALBUMIN  3.2*   BILITOTAL  0.5   ALKPHOS  74   AST  25   ALT  37         IMPRESSION/PLAN:  1. Metastatic esophogeal adenocarcinoma. He has had treatment with FOLFOX and then transitioned to Taxol with ramicurimab.  He was then on crizotinib on the [employer] Match study.  He progressed on this and returned on taxol/cyramza. And is now on Keytruda.    I am very pleased with how he is doing on Keytruda.  Will plan to repeat imaging in 2-3 weeks.    We discussed that he was screened for entrectinib and does not have identifiable mutations for this medication.    Other treatment options would include possible irinotecan or consideration of a phase 1 study.     A history of PE.  He is on Lovenox twice daily.  He has progressed through Xarelto in the past.      He does have baseline neuropathy and is on gabapentin 600/300/600 mg daily.         Dysphagia with associated gastroparesis by endoscopy. Overall improving.       He understands his therapies are not curative intent but rather palliative.  Additional supportive care measures are discussed.         Kadi Dee M.D.    Hematology and Oncology  [facility]  [phone]

## 2018-12-06 DIAGNOSIS — G62.0 CHEMOTHERAPY-INDUCED NEUROPATHY (H): ICD-10-CM

## 2018-12-06 DIAGNOSIS — T45.1X5A CHEMOTHERAPY-INDUCED NEUROPATHY (H): ICD-10-CM

## 2018-12-06 RX ORDER — GABAPENTIN 300 MG/1
CAPSULE ORAL
Qty: 180 CAPSULE | Refills: 1 | Status: SHIPPED | OUTPATIENT
Start: 2018-12-06 | End: 2019-02-05

## 2018-12-06 RX ORDER — GABAPENTIN 300 MG/1
CAPSULE ORAL
Qty: 180 CAPSULE | OUTPATIENT
Start: 2018-12-06

## 2018-12-07 ENCOUNTER — CARE COORDINATION (OUTPATIENT)
Dept: ONCOLOGY | Facility: CLINIC | Age: 58
End: 2018-12-07

## 2018-12-07 NOTE — PROGRESS NOTES
Spoke to Levi regarding his request to have his Keytruda on 12/17 when he see's Dr Dee for his MRI results.  It is too early to have the infusion, it is a 3 week infusion cycle and this will only be 2 weeks from the last infusion.  Discussed that Dr Dee is adding him onto her schedule on 12/17, he verbalized understanding that the BERENICE's are unable to change treatment plans so the best option is to see Dr Dee directly to review the scan and determine treatment going forward.  He agrees to come for his infusion the following week.  He reports tolerating oral intake without issue, he has not used the gtube in one week.   He reports his weight has remained stable on his home scale.  Encouraged him to call with any additional questions or concerns.

## 2018-12-12 ENCOUNTER — TELEPHONE (OUTPATIENT)
Dept: SURGERY | Facility: CLINIC | Age: 58
End: 2018-12-12

## 2018-12-12 NOTE — TELEPHONE ENCOUNTER
"Received message from triage nurse stating Reuben wanted to know how to \"safely\" go in a hot tub with a g-tube in place.    Called patient to let him know that a g-tube should not be submerged in water of any sorts. There was no answer. Message left with call back information.  "

## 2018-12-12 NOTE — TELEPHONE ENCOUNTER
Michael returned my call and I told him we instruct our patients not to submerge the tube in any type of standing water as standing water has a lot of bacteria and the g-tube is a portal for infection.     The Keytruda he is on makes his bones ache and he wants to be able to go in a hot tubHe will be seeing his oncologist soon and will talk with her about when he can get the g-tube removed.    I re-enforced that we do not advise submerging in water and he verbalized understanding on these instructions.

## 2018-12-14 ENCOUNTER — HOME INFUSION (PRE-WILLOW HOME INFUSION) (OUTPATIENT)
Dept: PHARMACY | Facility: CLINIC | Age: 58
End: 2018-12-14

## 2018-12-17 ENCOUNTER — ONCOLOGY VISIT (OUTPATIENT)
Dept: ONCOLOGY | Facility: CLINIC | Age: 58
End: 2018-12-17
Attending: INTERNAL MEDICINE
Payer: COMMERCIAL

## 2018-12-17 ENCOUNTER — APPOINTMENT (OUTPATIENT)
Dept: LAB | Facility: CLINIC | Age: 58
End: 2018-12-17
Attending: INTERNAL MEDICINE
Payer: COMMERCIAL

## 2018-12-17 ENCOUNTER — ANCILLARY PROCEDURE (OUTPATIENT)
Dept: CT IMAGING | Facility: CLINIC | Age: 58
End: 2018-12-17
Attending: INTERNAL MEDICINE
Payer: COMMERCIAL

## 2018-12-17 VITALS
TEMPERATURE: 97.3 F | OXYGEN SATURATION: 98 % | HEART RATE: 85 BPM | DIASTOLIC BLOOD PRESSURE: 86 MMHG | SYSTOLIC BLOOD PRESSURE: 126 MMHG | WEIGHT: 315 LBS | BODY MASS INDEX: 36.45 KG/M2 | RESPIRATION RATE: 18 BRPM | HEIGHT: 78 IN

## 2018-12-17 DIAGNOSIS — R19.7 DIARRHEA, UNSPECIFIED TYPE: ICD-10-CM

## 2018-12-17 DIAGNOSIS — C16.9 METASTASIS FROM GASTRIC CANCER (H): ICD-10-CM

## 2018-12-17 DIAGNOSIS — C79.9 METASTASIS FROM GASTRIC CANCER (H): ICD-10-CM

## 2018-12-17 DIAGNOSIS — C15.5 CANCER OF DISTAL THIRD OF ESOPHAGUS (H): ICD-10-CM

## 2018-12-17 LAB
ALBUMIN SERPL-MCNC: 3.5 G/DL (ref 3.4–5)
ALBUMIN UR-MCNC: 10 MG/DL
ALP SERPL-CCNC: 82 U/L (ref 40–150)
ALT SERPL W P-5'-P-CCNC: 40 U/L (ref 0–70)
AST SERPL W P-5'-P-CCNC: 30 U/L (ref 0–45)
BASOPHILS # BLD AUTO: 0 10E9/L (ref 0–0.2)
BASOPHILS NFR BLD AUTO: 0.3 %
BILIRUB DIRECT SERPL-MCNC: <0.1 MG/DL (ref 0–0.2)
BILIRUB SERPL-MCNC: 0.4 MG/DL (ref 0.2–1.3)
DIFFERENTIAL METHOD BLD: ABNORMAL
EOSINOPHIL # BLD AUTO: 0 10E9/L (ref 0–0.7)
EOSINOPHIL NFR BLD AUTO: 1.1 %
ERYTHROCYTE [DISTWIDTH] IN BLOOD BY AUTOMATED COUNT: 15 % (ref 10–15)
HCT VFR BLD AUTO: 41.1 % (ref 40–53)
HGB BLD-MCNC: 13 G/DL (ref 13.3–17.7)
IMM GRANULOCYTES # BLD: 0 10E9/L (ref 0–0.4)
IMM GRANULOCYTES NFR BLD: 0.3 %
LYMPHOCYTES # BLD AUTO: 1 10E9/L (ref 0.8–5.3)
LYMPHOCYTES NFR BLD AUTO: 26.6 %
MCH RBC QN AUTO: 28 PG (ref 26.5–33)
MCHC RBC AUTO-ENTMCNC: 31.6 G/DL (ref 31.5–36.5)
MCV RBC AUTO: 88 FL (ref 78–100)
MONOCYTES # BLD AUTO: 0.3 10E9/L (ref 0–1.3)
MONOCYTES NFR BLD AUTO: 7.6 %
NEUTROPHILS # BLD AUTO: 2.4 10E9/L (ref 1.6–8.3)
NEUTROPHILS NFR BLD AUTO: 64.1 %
NRBC # BLD AUTO: 0 10*3/UL
NRBC BLD AUTO-RTO: 0 /100
PLATELET # BLD AUTO: 140 10E9/L (ref 150–450)
PROT SERPL-MCNC: 7.4 G/DL (ref 6.8–8.8)
RADIOLOGIST FLAGS: ABNORMAL
RBC # BLD AUTO: 4.65 10E12/L (ref 4.4–5.9)
WBC # BLD AUTO: 3.8 10E9/L (ref 4–11)

## 2018-12-17 PROCEDURE — 25000128 H RX IP 250 OP 636: Mod: ZF | Performed by: INTERNAL MEDICINE

## 2018-12-17 PROCEDURE — G0463 HOSPITAL OUTPT CLINIC VISIT: HCPCS | Mod: ZF

## 2018-12-17 PROCEDURE — 80076 HEPATIC FUNCTION PANEL: CPT | Performed by: INTERNAL MEDICINE

## 2018-12-17 PROCEDURE — 99214 OFFICE O/P EST MOD 30 MIN: CPT | Mod: ZP | Performed by: INTERNAL MEDICINE

## 2018-12-17 PROCEDURE — 81003 URINALYSIS AUTO W/O SCOPE: CPT | Performed by: INTERNAL MEDICINE

## 2018-12-17 PROCEDURE — 85025 COMPLETE CBC W/AUTO DIFF WBC: CPT | Performed by: INTERNAL MEDICINE

## 2018-12-17 PROCEDURE — 36591 DRAW BLOOD OFF VENOUS DEVICE: CPT

## 2018-12-17 RX ORDER — IOPAMIDOL 755 MG/ML
135 INJECTION, SOLUTION INTRAVASCULAR ONCE
Status: COMPLETED | OUTPATIENT
Start: 2018-12-17 | End: 2018-12-17

## 2018-12-17 RX ORDER — HEPARIN SODIUM (PORCINE) LOCK FLUSH IV SOLN 100 UNIT/ML 100 UNIT/ML
5 SOLUTION INTRAVENOUS EVERY 8 HOURS
Status: DISCONTINUED | OUTPATIENT
Start: 2018-12-17 | End: 2018-12-17 | Stop reason: HOSPADM

## 2018-12-17 RX ORDER — HEPARIN SODIUM (PORCINE) LOCK FLUSH IV SOLN 100 UNIT/ML 100 UNIT/ML
5 SOLUTION INTRAVENOUS EVERY 8 HOURS
Status: COMPLETED | OUTPATIENT
Start: 2018-12-17 | End: 2018-12-17

## 2018-12-17 RX ORDER — DIPHENOXYLATE HCL/ATROPINE 2.5-.025MG
1 TABLET ORAL 4 TIMES DAILY PRN
Qty: 40 TABLET | Refills: 1 | Status: SHIPPED | OUTPATIENT
Start: 2018-12-17 | End: 2019-01-16

## 2018-12-17 RX ORDER — ZOLPIDEM TARTRATE 10 MG/1
10 TABLET ORAL
Qty: 60 TABLET | Refills: 1 | Status: SHIPPED | OUTPATIENT
Start: 2018-12-17 | End: 2019-01-16

## 2018-12-17 RX ADMIN — SODIUM CHLORIDE, PRESERVATIVE FREE 5 ML: 5 INJECTION INTRAVENOUS at 12:46

## 2018-12-17 RX ADMIN — IOPAMIDOL 135 ML: 755 INJECTION, SOLUTION INTRAVASCULAR at 13:29

## 2018-12-17 RX ADMIN — HEPARIN SODIUM (PORCINE) LOCK FLUSH IV SOLN 100 UNIT/ML 5 ML: 100 SOLUTION at 13:36

## 2018-12-17 ASSESSMENT — PAIN SCALES - GENERAL: PAINLEVEL: NO PAIN (0)

## 2018-12-17 ASSESSMENT — MIFFLIN-ST. JEOR: SCORE: 2495.35

## 2018-12-17 NOTE — DISCHARGE INSTRUCTIONS

## 2018-12-17 NOTE — LETTER
12/17/2018       RE: Reuben Padilla  [address] [place] [id number]     Dear Colleague,    Thank you for referring your patient, Reuben Padilla, to the [facility]. Please see a copy of my visit note below.    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 His tumor is positive for cytokeratin 20, negative for P63 and CK7, and HER2 is negative. He started on FOLFOX (5FU/oxaliplatin) on 5/15/2017. He had a delay in treatment from 9/5-10/2/17 due to work related injury.    He had an excellent response by imaging throughout the summer 2017.    He a mixed response by imaging in late fall 2017.    In January 2018, he was switched to taxol and cyramza - had slight progression and neuropathy and clinical trial became available.       In March 2018, he was started on the [facility] Match Clinical Trial with crizotinib.  (MET amplification) He remained on crizotinib from March - July 2018.    August 2018, he was switched back to taxol/cramza.  In October, he was switched to Keytruda (PD1 overexpressor).    INTERVAL HISTORY: Levi comes in today for followup. He is now on Keytruda.  He is here for restaging after three cycles of Keytruda.     He has had no issues with fevers or chills, no chest pain or shortness of breath, no nausea, vomiting, diarrhea or constipation.  He does report arthralgias and myalgias.  These were particularly in his shoulders, his hands and in his knees.  He notices some mild discomfort.  There is no erythema or swelling over the joints.      He reports that he no longer uses his tube feeds and hasn't for the last two weeks.  He has no abdminal pain.  No n/v/d/c.       His energy levels are good.  Weight increased.    He has been out four wheeling and riding with  "friends.        ROS: 10 point ROS neg other than the symptoms noted above in the HPI.    PHYSICAL EXAMINATION  /86   Pulse 85   Temp 97.3  F (36.3  C) (Oral)   Resp 18   Ht 1.981 m (6' 5.99\")   Wt (!) 154.2 kg (340 lb)   SpO2 98%   BMI 39.30 kg/m      Wt Readings from Last 4 Encounters:   12/17/18 (!) 154.2 kg (340 lb)   12/03/18 149.8 kg (330 lb 4 oz)   11/28/18 149.5 kg (329 lb 8 oz)   11/20/18 145.6 kg (321 lb)     Constitutional: Alert, oriented male in no visible distress.  Eyes: PERRL. Anicteric sclerae.  ENT/Mouth: OM moist and pink without lesions or thrush.  CV: RRR  Resp: CTAB throughout  Abdomen: Soft, non-tender, non-distended. Obese. Bowel sounds present. Unable to palpate liver or spleen.  Gi tube in upper quadrant of L abdomen - it is not tender  Extremities: trace edema , no erythema or warmth over joints  Skin: Warm, dry.   Lymph: No cervical or supraclavicular lymphadenopathy appreciated.   Neuro: CN II-XII grossly intact.       ECOG PS: 1       Lab Results   Component Value Date    WBC 3.8 12/17/2018     Lab Results   Component Value Date    RBC 4.65 12/17/2018     Lab Results   Component Value Date    HGB 13.0 12/17/2018     Lab Results   Component Value Date    HCT 41.1 12/17/2018     No components found for: MCT  Lab Results   Component Value Date    MCV 88 12/17/2018     Lab Results   Component Value Date    MCH 28.0 12/17/2018     Lab Results   Component Value Date    MCHC 31.6 12/17/2018     Lab Results   Component Value Date    RDW 15.0 12/17/2018     Lab Results   Component Value Date     12/17/2018       Recent Labs   Lab Test 12/03/18  0842 11/13/18  0928    138   POTASSIUM 4.1 4.6   CHLORIDE 112* 106   CO2 23 26   ANIONGAP 7 6   * 115*   BUN 16 22   CR 0.81 0.87   NIDHI 8.4* 8.9     Liver Function Studies -   Recent Labs   Lab Test 12/17/18  1253   PROTTOTAL 7.4   ALBUMIN 3.5   BILITOTAL 0.4   ALKPHOS 82   AST 30   ALT 40       IMPRESSION/PLAN:  1. " Metastatic esophogeal adenocarcinoma. He has had treatment with FOLFOX and then transitioned to Taxol with ramicurimab.  He was then on crizotinib on the [employer] Match study.  He progressed on this and returned on taxol/cyramza. And is now on Keytruda.    I am very pleased with how he is doing on Keytruda.  Will plan to repeat imaging in 9-12 weeks.  He has a few lymph nodes that are slightly larger and likely represent pseudoprogression  Other areas are stable, and there are no new areas.      We discussed that he was screened for entrectinib and does not have identifiable mutations for this medication.    Other treatment options would include possible irinotecan or consideration of a phase 1 study.     A history of PE.  He is on Lovenox twice daily.  He has progressed through Xarelto in the past.      He does have baseline neuropathy and is on gabapentin 600/300/600 mg daily.         Dysphagia with associated gastroparesis by endoscopy. Overall improving.  On CT, his Gtube has the appearance of being displaced.  On exam however this does not seem to be the case; no pain.  Will follow.       He understands his therapies are not curative intent but rather palliative.  Additional supportive care measures are discussed.         Kadi Dee M.D.    Hematology and Oncology  [facility]  [phone]

## 2018-12-17 NOTE — PROGRESS NOTES
This is a recent snapshot of the patient's [facility] medical record.  For current drug dose and complete information and questions, call [phone] or In Basket pool, [facility]v home infusion ([id number])  CSN Number:  [id number]

## 2018-12-17 NOTE — NURSING NOTE
Chief Complaint   Patient presents with     Port Draw     Labs drawn via PORT by RN in Lab. Line flushed with saline and heparin. VS taken.      Ren lKine RN

## 2018-12-17 NOTE — PROGRESS NOTES
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 His tumor is positive for cytokeratin 20, negative for P63 and CK7, and HER2 is negative. He started on FOLFOX (5FU/oxaliplatin) on 5/15/2017. He had a delay in treatment from 9/5-10/2/17 due to work related injury.    He had an excellent response by imaging throughout the summer 2017.    He a mixed response by imaging in late fall 2017.    In January 2018, he was switched to taxol and cyramza - had slight progression and neuropathy and clinical trial became available.       In March 2018, he was started on the [facility] Match Clinical Trial with crizotinib.  (MET amplification) He remained on crizotinib from March - July 2018.    August 2018, he was switched back to taxol/cramza.  In October, he was switched to Keytruda (PD1 overexpressor).    INTERVAL HISTORY: Levi comes in today for followup. He is now on Keytruda.  He is here for restaging after three cycles of Keytruda.     He has had no issues with fevers or chills, no chest pain or shortness of breath, no nausea, vomiting, diarrhea or constipation.  He does report arthralgias and myalgias.  These were particularly in his shoulders, his hands and in his knees.  He notices some mild discomfort.  There is no erythema or swelling over the joints.      He reports that he no longer uses his tube feeds and hasn't for the last two weeks.  He has no abdminal pain.  No n/v/d/c.       His energy levels are good.  Weight increased.    He has been out four wheeling and riding with friends.        ROS: 10 point ROS neg other than the symptoms noted above in the HPI.    PHYSICAL EXAMINATION  /86   Pulse 85   Temp 97.3  F (36.3  C) (Oral)   Resp 18   Ht 1.981 m (6' 5.99\")   Wt (!) 154.2 kg (340 lb)   SpO2 98%   BMI " 39.30 kg/m     Wt Readings from Last 4 Encounters:   12/17/18 (!) 154.2 kg (340 lb)   12/03/18 149.8 kg (330 lb 4 oz)   11/28/18 149.5 kg (329 lb 8 oz)   11/20/18 145.6 kg (321 lb)     Constitutional: Alert, oriented male in no visible distress.  Eyes: PERRL. Anicteric sclerae.  ENT/Mouth: OM moist and pink without lesions or thrush.  CV: RRR  Resp: CTAB throughout  Abdomen: Soft, non-tender, non-distended. Obese. Bowel sounds present. Unable to palpate liver or spleen.  Gi tube in upper quadrant of L abdomen - it is not tender  Extremities: trace edema , no erythema or warmth over joints  Skin: Warm, dry.   Lymph: No cervical or supraclavicular lymphadenopathy appreciated.   Neuro: CN II-XII grossly intact.       ECOG PS: 1       Lab Results   Component Value Date    WBC 3.8 12/17/2018     Lab Results   Component Value Date    RBC 4.65 12/17/2018     Lab Results   Component Value Date    HGB 13.0 12/17/2018     Lab Results   Component Value Date    HCT 41.1 12/17/2018     No components found for: MCT  Lab Results   Component Value Date    MCV 88 12/17/2018     Lab Results   Component Value Date    MCH 28.0 12/17/2018     Lab Results   Component Value Date    MCHC 31.6 12/17/2018     Lab Results   Component Value Date    RDW 15.0 12/17/2018     Lab Results   Component Value Date     12/17/2018       Recent Labs   Lab Test 12/03/18  0842 11/13/18  0928    138   POTASSIUM 4.1 4.6   CHLORIDE 112* 106   CO2 23 26   ANIONGAP 7 6   * 115*   BUN 16 22   CR 0.81 0.87   INDHI 8.4* 8.9     Liver Function Studies -   Recent Labs   Lab Test 12/17/18  1253   PROTTOTAL 7.4   ALBUMIN 3.5   BILITOTAL 0.4   ALKPHOS 82   AST 30   ALT 40       IMPRESSION/PLAN:  1. Metastatic esophogeal adenocarcinoma. He has had treatment with FOLFOX and then transitioned to Taxol with ramicurimab.  He was then on crizotinib on the [facility] Match study.  He progressed on this and returned on taxol/cyramza. And is now on Keytruda.    I am  very pleased with how he is doing on Keytruda.  Will plan to repeat imaging in 9-12 weeks.  He has a few lymph nodes that are slightly larger and likely represent pseudoprogression  Other areas are stable, and there are no new areas.      We discussed that he was screened for entrectinib and does not have identifiable mutations for this medication.    Other treatment options would include possible irinotecan or consideration of a phase 1 study.     A history of PE.  He is on Lovenox twice daily.  He has progressed through Xarelto in the past.      He does have baseline neuropathy and is on gabapentin 600/300/600 mg daily.         Dysphagia with associated gastroparesis by endoscopy. Overall improving.  On CT, his Gtube has the appearance of being displaced.  On exam however this does not seem to be the case; no pain.  Will follow.       He understands his therapies are not curative intent but rather palliative.  Additional supportive care measures are discussed.         Kadi Dee M.D.    Hematology and Oncology  [facility]  [phone]

## 2018-12-17 NOTE — NURSING NOTE
"Oncology Rooming Note    December 17, 2018 2:15 PM   Reuben Padilla is a 58 year old male who presents for:    Chief Complaint   Patient presents with     Port Draw     Labs drawn via PORT by RN in Lab. Line flushed with saline and heparin. VS taken.      Oncology Clinic Visit     Esophageal Ca F/U     Initial Vitals: /86   Pulse 85   Temp 97.3  F (36.3  C) (Oral)   Resp 18   Ht 1.981 m (6' 5.99\")   Wt (!) 154.2 kg (340 lb)   SpO2 98%   BMI 39.30 kg/m   Estimated body mass index is 39.3 kg/m  as calculated from the following:    Height as of this encounter: 1.981 m (6' 5.99\").    Weight as of this encounter: 154.2 kg (340 lb). Body surface area is 2.91 meters squared.  No Pain (0) Comment: Data Unavailable   No LMP for male patient.  Allergies reviewed: Yes  Medications reviewed: Yes    Medications: MEDICATION REFILLS NEEDED TODAY. Provider was notified.  Pharmacy name entered into [employer]:    [id number] - [facility] - [address] [place] [address] AT INTERSECTION [address] [place] [address] PHARM[place]Y [place], [place] - [address] [place] [address] [id number]  [id number] [place] [place] - [address] [facility]    Clinical concerns: Yes, Patient states he has been having loose stools and joint stiffness. Dr Dee was notified.    10 minutes for nursing intake (face to face time)     SENA ROSE LPN            "

## 2018-12-19 ENCOUNTER — TELEPHONE (OUTPATIENT)
Dept: SURGERY | Facility: CLINIC | Age: 58
End: 2018-12-19

## 2018-12-19 ASSESSMENT — ENCOUNTER SYMPTOMS
MYALGIAS: 1
HEARTBURN: 0
EXERCISE INTOLERANCE: 0
STIFFNESS: 1
SWOLLEN GLANDS: 0
SYNCOPE: 0
DIARRHEA: 1
ARTHRALGIAS: 1
JOINT SWELLING: 1
SLEEP DISTURBANCES DUE TO BREATHING: 0
VOMITING: 0
MUSCLE CRAMPS: 0
LIGHT-HEADEDNESS: 0
PALPITATIONS: 1
LEG PAIN: 0
ORTHOPNEA: 0
ABDOMINAL PAIN: 0
BLOOD IN STOOL: 0
BLOATING: 0
RECTAL PAIN: 0
BRUISES/BLEEDS EASILY: 0
BACK PAIN: 0
BOWEL INCONTINENCE: 0
HYPERTENSION: 0
JAUNDICE: 0
NECK PAIN: 0
CONSTIPATION: 1
MUSCLE WEAKNESS: 1
NAUSEA: 0
HYPOTENSION: 0

## 2018-12-19 NOTE — TELEPHONE ENCOUNTER
Call from Levi reporting his g-tube has become dislodged. This was verified via CT scan.    He has not used the tube for 2-3 weeks. I asked him to come to clinic to have the tube removed completely. He lives 100 miles away and cannot come today. He does not have a local provider that he can see.  He will be able to come in tomorrow at 1100 so that I can remove it for him.

## 2018-12-20 ENCOUNTER — OFFICE VISIT (OUTPATIENT)
Dept: SURGERY | Facility: CLINIC | Age: 58
End: 2018-12-20
Attending: CLINICAL NURSE SPECIALIST
Payer: COMMERCIAL

## 2018-12-20 DIAGNOSIS — Z46.59 ENCOUNTER FOR CARE RELATED TO FEEDING TUBE: ICD-10-CM

## 2018-12-20 DIAGNOSIS — C15.5 CANCER OF DISTAL THIRD OF ESOPHAGUS (H): Primary | ICD-10-CM

## 2018-12-20 PROCEDURE — 40000114 ZZH STATISTIC NO CHARGE CLINIC VISIT

## 2018-12-20 NOTE — PROGRESS NOTES
"REASON FOR VISIT: PEG removal    TUBE TYPE: 20 Fr gastrostomy tube    DATE PLACED: 11/05/2018    SUBJECTIVE: Levi has not used his tube for any nutrition for 3 weeks. He finds the tube quite annoying. He was told that the tube was no longer in the stomach and his doctor told him \"its no problem\" and to \"wait until it causes problems\".      OBJECTIVE: Tube site is clean and dry. Surrounding skin is clean and dry. I explained the removal procedure and the potential complication of pain, bleeding and retained bumper. Levi verbally consented to me removing the tube.     The tube was removed with bumper intact. Levi tolerated this well. There was a small amount of bleeding that stopped after a few minutes of direct pressure. A dressing was applied to the area.    SITE CARE INSTRUCTIONS: OK to remove dressing to shower. NO submerging in water (hot tubs, pools, etc) until the site is completely closed over. I told Levi to expect some drainage until the site closes up.     PLAN: follow up as needed    Total time spent, 15 minutes, all in counseling and coordination of care.    Sharita Guardado, CNS  Thoracic Surgery    "

## 2018-12-20 NOTE — LETTER
"12/20/2018       RE: Reuben Padilla  [address] [place] [id number]     Dear Colleague,    Thank you for referring your patient, Reuben Padilla, to the [facility]. Please see a copy of my visit note below.    REASON FOR VISIT: PEG removal    TUBE TYPE: 20 Fr gastrostomy tube    DATE PLACED: 11/05/2018    SUBJECTIVE: Levi has not used his tube for any nutrition for 3 weeks. He finds the tube quite annoying. He was told that the tube was no longer in the stomach and his doctor told him \"its no problem\" and to \"wait until it causes problems\".      OBJECTIVE: Tube site is clean and dry. Surrounding skin is clean and dry. I explained the removal procedure and the potential complication of pain, bleeding and retained bumper. Levi verbally consented to me removing the tube.     The tube was removed with bumper intact. Levi tolerated this well. There was a small amount of bleeding that stopped after a few minutes of direct pressure. A dressing was applied to the area.    SITE CARE INSTRUCTIONS: OK to remove dressing to shower. NO submerging in water (hot tubs, pools, etc) until the site is completely closed over. I told Levi to expect some drainage until the site closes up.     PLAN: follow up as needed    Total time spent, 15 minutes, all in counseling and coordination of care.    Sharita Guardado, CNS  Thoracic Surgery      Again, thank you for allowing me to participate in the care of your patient.      Sincerely,    Sharita Guardado, APRN CNS      "

## 2018-12-24 ENCOUNTER — TELEPHONE (OUTPATIENT)
Dept: ONCOLOGY | Facility: CLINIC | Age: 58
End: 2018-12-24

## 2018-12-24 NOTE — TELEPHONE ENCOUNTER
Per Patient's request,  completed and faxed [employer] request for lodging dates 12/27-12/28. Caregiver exemption form also completed and sent. [employer] will contact Patient for confirmation of reservation.  will continue to provide support as needed.    Soo Yeon Han, [facility]  Pager:  [phone]

## 2018-12-27 ENCOUNTER — INFUSION THERAPY VISIT (OUTPATIENT)
Dept: ONCOLOGY | Facility: CLINIC | Age: 58
End: 2018-12-27
Attending: INTERNAL MEDICINE
Payer: COMMERCIAL

## 2018-12-27 ENCOUNTER — APPOINTMENT (OUTPATIENT)
Dept: LAB | Facility: CLINIC | Age: 58
End: 2018-12-27
Attending: INTERNAL MEDICINE
Payer: COMMERCIAL

## 2018-12-27 ENCOUNTER — HOME INFUSION (PRE-WILLOW HOME INFUSION) (OUTPATIENT)
Dept: PHARMACY | Facility: CLINIC | Age: 58
End: 2018-12-27

## 2018-12-27 VITALS
BODY MASS INDEX: 38.18 KG/M2 | WEIGHT: 315 LBS | DIASTOLIC BLOOD PRESSURE: 81 MMHG | SYSTOLIC BLOOD PRESSURE: 119 MMHG | OXYGEN SATURATION: 96 % | HEART RATE: 96 BPM | RESPIRATION RATE: 16 BRPM | TEMPERATURE: 98.4 F

## 2018-12-27 DIAGNOSIS — T45.1X5A CHEMOTHERAPY-INDUCED NEUTROPENIA (H): ICD-10-CM

## 2018-12-27 DIAGNOSIS — C15.5 CANCER OF DISTAL THIRD OF ESOPHAGUS (H): Primary | ICD-10-CM

## 2018-12-27 DIAGNOSIS — D70.1 CHEMOTHERAPY-INDUCED NEUTROPENIA (H): ICD-10-CM

## 2018-12-27 LAB
ALBUMIN SERPL-MCNC: 3.5 G/DL (ref 3.4–5)
ALP SERPL-CCNC: 91 U/L (ref 40–150)
ALT SERPL W P-5'-P-CCNC: 34 U/L (ref 0–70)
ANION GAP SERPL CALCULATED.3IONS-SCNC: 5 MMOL/L (ref 3–14)
AST SERPL W P-5'-P-CCNC: 24 U/L (ref 0–45)
BASOPHILS # BLD AUTO: 0 10E9/L (ref 0–0.2)
BASOPHILS NFR BLD AUTO: 0.3 %
BILIRUB DIRECT SERPL-MCNC: 0.1 MG/DL (ref 0–0.2)
BILIRUB SERPL-MCNC: 0.5 MG/DL (ref 0.2–1.3)
BUN SERPL-MCNC: 14 MG/DL (ref 7–30)
CALCIUM SERPL-MCNC: 8.6 MG/DL (ref 8.5–10.1)
CHLORIDE SERPL-SCNC: 109 MMOL/L (ref 94–109)
CO2 SERPL-SCNC: 25 MMOL/L (ref 20–32)
CREAT SERPL-MCNC: 0.78 MG/DL (ref 0.66–1.25)
DIFFERENTIAL METHOD BLD: ABNORMAL
EOSINOPHIL # BLD AUTO: 0.1 10E9/L (ref 0–0.7)
EOSINOPHIL NFR BLD AUTO: 1.4 %
ERYTHROCYTE [DISTWIDTH] IN BLOOD BY AUTOMATED COUNT: 14.6 % (ref 10–15)
GFR SERPL CREATININE-BSD FRML MDRD: >90 ML/MIN/{1.73_M2}
GLUCOSE SERPL-MCNC: 119 MG/DL (ref 70–99)
HCT VFR BLD AUTO: 41.5 % (ref 40–53)
HGB BLD-MCNC: 13.2 G/DL (ref 13.3–17.7)
IMM GRANULOCYTES # BLD: 0 10E9/L (ref 0–0.4)
IMM GRANULOCYTES NFR BLD: 0.3 %
LYMPHOCYTES # BLD AUTO: 1.2 10E9/L (ref 0.8–5.3)
LYMPHOCYTES NFR BLD AUTO: 32.2 %
MCH RBC QN AUTO: 28.3 PG (ref 26.5–33)
MCHC RBC AUTO-ENTMCNC: 31.8 G/DL (ref 31.5–36.5)
MCV RBC AUTO: 89 FL (ref 78–100)
MONOCYTES # BLD AUTO: 0.2 10E9/L (ref 0–1.3)
MONOCYTES NFR BLD AUTO: 4.5 %
NEUTROPHILS # BLD AUTO: 2.2 10E9/L (ref 1.6–8.3)
NEUTROPHILS NFR BLD AUTO: 61.3 %
NRBC # BLD AUTO: 0 10*3/UL
NRBC BLD AUTO-RTO: 0 /100
PLATELET # BLD AUTO: 143 10E9/L (ref 150–450)
POTASSIUM SERPL-SCNC: 4.3 MMOL/L (ref 3.4–5.3)
PROT SERPL-MCNC: 7.5 G/DL (ref 6.8–8.8)
RBC # BLD AUTO: 4.66 10E12/L (ref 4.4–5.9)
SODIUM SERPL-SCNC: 139 MMOL/L (ref 133–144)
TSH SERPL DL<=0.005 MIU/L-ACNC: 2.09 MU/L (ref 0.4–4)
WBC # BLD AUTO: 3.6 10E9/L (ref 4–11)

## 2018-12-27 PROCEDURE — 96413 CHEMO IV INFUSION 1 HR: CPT

## 2018-12-27 PROCEDURE — 82248 BILIRUBIN DIRECT: CPT | Performed by: INTERNAL MEDICINE

## 2018-12-27 PROCEDURE — 25000128 H RX IP 250 OP 636: Mod: ZF | Performed by: INTERNAL MEDICINE

## 2018-12-27 PROCEDURE — 80053 COMPREHEN METABOLIC PANEL: CPT | Performed by: INTERNAL MEDICINE

## 2018-12-27 PROCEDURE — 85025 COMPLETE CBC W/AUTO DIFF WBC: CPT | Performed by: INTERNAL MEDICINE

## 2018-12-27 PROCEDURE — 84443 ASSAY THYROID STIM HORMONE: CPT | Performed by: INTERNAL MEDICINE

## 2018-12-27 RX ORDER — METHYLPREDNISOLONE SODIUM SUCCINATE 125 MG/2ML
125 INJECTION, POWDER, LYOPHILIZED, FOR SOLUTION INTRAMUSCULAR; INTRAVENOUS
Status: CANCELLED
Start: 2018-12-27

## 2018-12-27 RX ORDER — HEPARIN SODIUM (PORCINE) LOCK FLUSH IV SOLN 100 UNIT/ML 100 UNIT/ML
500 SOLUTION INTRAVENOUS ONCE
Status: DISCONTINUED | OUTPATIENT
Start: 2018-12-27 | End: 2018-12-27 | Stop reason: HOSPADM

## 2018-12-27 RX ORDER — ALBUTEROL SULFATE 0.83 MG/ML
2.5 SOLUTION RESPIRATORY (INHALATION)
Status: CANCELLED | OUTPATIENT
Start: 2018-12-27

## 2018-12-27 RX ORDER — LORAZEPAM 2 MG/ML
0.5 INJECTION INTRAMUSCULAR EVERY 4 HOURS PRN
Status: CANCELLED
Start: 2018-12-27

## 2018-12-27 RX ORDER — HEPARIN SODIUM (PORCINE) LOCK FLUSH IV SOLN 100 UNIT/ML 100 UNIT/ML
5 SOLUTION INTRAVENOUS ONCE
Status: COMPLETED | OUTPATIENT
Start: 2018-12-27 | End: 2018-12-27

## 2018-12-27 RX ORDER — SODIUM CHLORIDE 9 MG/ML
1000 INJECTION, SOLUTION INTRAVENOUS CONTINUOUS PRN
Status: CANCELLED
Start: 2018-12-27

## 2018-12-27 RX ORDER — EPINEPHRINE 0.3 MG/.3ML
0.3 INJECTION SUBCUTANEOUS EVERY 5 MIN PRN
Status: CANCELLED | OUTPATIENT
Start: 2018-12-27

## 2018-12-27 RX ORDER — EPINEPHRINE 1 MG/ML
0.3 INJECTION, SOLUTION INTRAMUSCULAR; SUBCUTANEOUS EVERY 5 MIN PRN
Status: CANCELLED | OUTPATIENT
Start: 2018-12-27

## 2018-12-27 RX ORDER — ALBUTEROL SULFATE 90 UG/1
1-2 AEROSOL, METERED RESPIRATORY (INHALATION)
Status: CANCELLED
Start: 2018-12-27

## 2018-12-27 RX ORDER — DIPHENHYDRAMINE HYDROCHLORIDE 50 MG/ML
50 INJECTION INTRAMUSCULAR; INTRAVENOUS
Status: CANCELLED
Start: 2018-12-27

## 2018-12-27 RX ORDER — MEPERIDINE HYDROCHLORIDE 25 MG/ML
25 INJECTION INTRAMUSCULAR; INTRAVENOUS; SUBCUTANEOUS EVERY 30 MIN PRN
Status: CANCELLED | OUTPATIENT
Start: 2018-12-27

## 2018-12-27 RX ADMIN — Medication 5 ML: at 17:57

## 2018-12-27 RX ADMIN — SODIUM CHLORIDE 200 MG: 9 INJECTION, SOLUTION INTRAVENOUS at 17:23

## 2018-12-27 RX ADMIN — SODIUM CHLORIDE 250 ML: 9 INJECTION, SOLUTION INTRAVENOUS at 17:10

## 2018-12-27 ASSESSMENT — PAIN SCALES - GENERAL: PAINLEVEL: NO PAIN (0)

## 2018-12-27 NOTE — PROGRESS NOTES
Infusion Nursing Note:  Reuben Padilla presents today for C4D1 Keytruda.    Patient seen by provider today: No   present during visit today: Not Applicable.    Note: Patient feels well. Denies fever/chills nor any signs of infection. Denies nausea/vomiting nor chest and abdominal discomfort. No new concerns made. Otherwise well.     Intravenous Access:  Implanted Port.    Treatment Conditions:  Lab Results   Component Value Date    HGB 13.2 12/27/2018     Lab Results   Component Value Date    WBC 3.6 12/27/2018      Lab Results   Component Value Date    ANEU 2.2 12/27/2018     Lab Results   Component Value Date     12/27/2018      Lab Results   Component Value Date     12/27/2018                   Lab Results   Component Value Date    POTASSIUM 4.3 12/27/2018           Lab Results   Component Value Date    MAG 1.9 11/07/2018            Lab Results   Component Value Date    CR 0.78 12/27/2018                   Lab Results   Component Value Date    NIDHI 8.6 12/27/2018                Lab Results   Component Value Date    BILITOTAL 0.5 12/27/2018           Lab Results   Component Value Date    ALBUMIN 3.5 12/27/2018                    Lab Results   Component Value Date    ALT 34 12/27/2018           Lab Results   Component Value Date    AST 24 12/27/2018       Results reviewed, labs MET treatment parameters, ok to proceed with treatment.      Post Infusion Assessment:  Patient tolerated infusion without incident.  Blood return noted pre and post infusion.  Site patent and intact, free from redness, edema or discomfort.  No evidence of extravasations.  Access discontinued per protocol.    Discharge Plan:   Prescription refills given for Lovenox.  Discharge instructions reviewed with: Patient.  Patient and/or family verbalized understanding of discharge instructions and all questions answered.  AVS to patient via [employer].  Patient will return 1/16/19 for next appointment.   Patient discharged in  stable condition accompanied by: self.  Departure Mode: Ambulatory.  Face to Face time: 4.    KAMI YOUNG RN

## 2018-12-27 NOTE — NURSING NOTE
Chief Complaint   Patient presents with     Chemotherapy     C4D1 Keytruda     Blood Draw     Labs drawn by RN from port. VS taken.      JOSIANE BUSCH RN

## 2018-12-31 DIAGNOSIS — C15.5 CANCER OF DISTAL THIRD OF ESOPHAGUS (H): Primary | ICD-10-CM

## 2018-12-31 RX ORDER — METHYLPREDNISOLONE 4 MG
TABLET, DOSE PACK ORAL
Qty: 21 TABLET | Refills: 0 | Status: SHIPPED | OUTPATIENT
Start: 2018-12-31 | End: 2019-01-16

## 2018-12-31 NOTE — PROGRESS NOTES
Levi called to report having continued and increasing pain in joints from Keytruda.  Pain specifically is increasing in his right shoulder.   Discussed with Dr Dee who ordered a medrol pack   LVM for Levi regarding the dose pack and it will be available at [facility] in [place].

## 2019-01-01 ENCOUNTER — APPOINTMENT (OUTPATIENT)
Dept: LAB | Facility: CLINIC | Age: 59
End: 2019-01-01
Attending: INTERNAL MEDICINE
Payer: COMMERCIAL

## 2019-01-10 ENCOUNTER — TELEPHONE (OUTPATIENT)
Dept: ONCOLOGY | Facility: CLINIC | Age: 59
End: 2019-01-10

## 2019-01-10 NOTE — TELEPHONE ENCOUNTER
Per Patient's request,  completed and faxed [employer] referral for lodging dates 1/16 - 1/17, 2/5 - 2/6, and 2/24 - 2/25. [employer] will contact Patient directly for confirmation of reservation.  will continue to provide support as needed.      Nieves Casper (Martens), GASPER, MSW   - [facility]  Phone: [phone]  Pager: [phone]  1/10/2019

## 2019-01-16 ENCOUNTER — INFUSION THERAPY VISIT (OUTPATIENT)
Dept: ONCOLOGY | Facility: CLINIC | Age: 59
End: 2019-01-16
Attending: INTERNAL MEDICINE
Payer: COMMERCIAL

## 2019-01-16 VITALS
TEMPERATURE: 98.6 F | DIASTOLIC BLOOD PRESSURE: 80 MMHG | OXYGEN SATURATION: 97 % | HEART RATE: 108 BPM | RESPIRATION RATE: 16 BRPM | BODY MASS INDEX: 37 KG/M2 | WEIGHT: 315 LBS | SYSTOLIC BLOOD PRESSURE: 116 MMHG

## 2019-01-16 DIAGNOSIS — R19.7 DIARRHEA, UNSPECIFIED TYPE: ICD-10-CM

## 2019-01-16 DIAGNOSIS — T45.1X5A CHEMOTHERAPY-INDUCED NEUTROPENIA (H): ICD-10-CM

## 2019-01-16 DIAGNOSIS — I26.99 OTHER ACUTE PULMONARY EMBOLISM WITHOUT ACUTE COR PULMONALE (H): Primary | ICD-10-CM

## 2019-01-16 DIAGNOSIS — D70.1 CHEMOTHERAPY-INDUCED NEUTROPENIA (H): ICD-10-CM

## 2019-01-16 DIAGNOSIS — C15.5 CANCER OF DISTAL THIRD OF ESOPHAGUS (H): Primary | ICD-10-CM

## 2019-01-16 DIAGNOSIS — C79.9 METASTASIS FROM GASTRIC CANCER (H): ICD-10-CM

## 2019-01-16 DIAGNOSIS — C16.9 METASTASIS FROM GASTRIC CANCER (H): ICD-10-CM

## 2019-01-16 DIAGNOSIS — M25.50 MULTIPLE JOINT PAIN: ICD-10-CM

## 2019-01-16 LAB
ALBUMIN SERPL-MCNC: 3.5 G/DL (ref 3.4–5)
ALP SERPL-CCNC: 91 U/L (ref 40–150)
ALT SERPL W P-5'-P-CCNC: 36 U/L (ref 0–70)
ANION GAP SERPL CALCULATED.3IONS-SCNC: 8 MMOL/L (ref 3–14)
AST SERPL W P-5'-P-CCNC: 27 U/L (ref 0–45)
BASOPHILS # BLD AUTO: 0 10E9/L (ref 0–0.2)
BASOPHILS NFR BLD AUTO: 0.4 %
BILIRUB DIRECT SERPL-MCNC: 0.3 MG/DL (ref 0–0.2)
BILIRUB SERPL-MCNC: 1 MG/DL (ref 0.2–1.3)
BUN SERPL-MCNC: 15 MG/DL (ref 7–30)
CALCIUM SERPL-MCNC: 8.4 MG/DL (ref 8.5–10.1)
CHLORIDE SERPL-SCNC: 110 MMOL/L (ref 94–109)
CO2 SERPL-SCNC: 20 MMOL/L (ref 20–32)
CREAT SERPL-MCNC: 0.9 MG/DL (ref 0.66–1.25)
CRP SERPL-MCNC: 6.7 MG/L (ref 0–8)
DIFFERENTIAL METHOD BLD: ABNORMAL
EOSINOPHIL # BLD AUTO: 0.1 10E9/L (ref 0–0.7)
EOSINOPHIL NFR BLD AUTO: 0.9 %
ERYTHROCYTE [DISTWIDTH] IN BLOOD BY AUTOMATED COUNT: 15.6 % (ref 10–15)
ERYTHROCYTE [SEDIMENTATION RATE] IN BLOOD BY WESTERGREN METHOD: 10 MM/H (ref 0–20)
GFR SERPL CREATININE-BSD FRML MDRD: >90 ML/MIN/{1.73_M2}
GLUCOSE SERPL-MCNC: 100 MG/DL (ref 70–99)
HCT VFR BLD AUTO: 41.6 % (ref 40–53)
HGB BLD-MCNC: 14 G/DL (ref 13.3–17.7)
IMM GRANULOCYTES # BLD: 0 10E9/L (ref 0–0.4)
IMM GRANULOCYTES NFR BLD: 0.4 %
LMWH PPP CHRO-ACNC: 0.54 IU/ML
LYMPHOCYTES # BLD AUTO: 0.9 10E9/L (ref 0.8–5.3)
LYMPHOCYTES NFR BLD AUTO: 16.8 %
MCH RBC QN AUTO: 28.8 PG (ref 26.5–33)
MCHC RBC AUTO-ENTMCNC: 33.7 G/DL (ref 31.5–36.5)
MCV RBC AUTO: 86 FL (ref 78–100)
MONOCYTES # BLD AUTO: 0.4 10E9/L (ref 0–1.3)
MONOCYTES NFR BLD AUTO: 7.2 %
NEUTROPHILS # BLD AUTO: 4.1 10E9/L (ref 1.6–8.3)
NEUTROPHILS NFR BLD AUTO: 74.3 %
NRBC # BLD AUTO: 0 10*3/UL
NRBC BLD AUTO-RTO: 0 /100
PLATELET # BLD AUTO: 127 10E9/L (ref 150–450)
POTASSIUM SERPL-SCNC: 3.9 MMOL/L (ref 3.4–5.3)
PROT SERPL-MCNC: 7.4 G/DL (ref 6.8–8.8)
RBC # BLD AUTO: 4.86 10E12/L (ref 4.4–5.9)
SODIUM SERPL-SCNC: 137 MMOL/L (ref 133–144)
WBC # BLD AUTO: 5.5 10E9/L (ref 4–11)

## 2019-01-16 PROCEDURE — 99214 OFFICE O/P EST MOD 30 MIN: CPT | Mod: ZP | Performed by: PHYSICIAN ASSISTANT

## 2019-01-16 PROCEDURE — G0463 HOSPITAL OUTPT CLINIC VISIT: HCPCS | Mod: ZF

## 2019-01-16 PROCEDURE — 96413 CHEMO IV INFUSION 1 HR: CPT

## 2019-01-16 PROCEDURE — 82248 BILIRUBIN DIRECT: CPT | Performed by: INTERNAL MEDICINE

## 2019-01-16 PROCEDURE — 85520 HEPARIN ASSAY: CPT | Performed by: PHYSICIAN ASSISTANT

## 2019-01-16 PROCEDURE — 25000128 H RX IP 250 OP 636: Mod: ZF | Performed by: PHYSICIAN ASSISTANT

## 2019-01-16 PROCEDURE — 85652 RBC SED RATE AUTOMATED: CPT | Performed by: PHYSICIAN ASSISTANT

## 2019-01-16 PROCEDURE — 80053 COMPREHEN METABOLIC PANEL: CPT | Performed by: INTERNAL MEDICINE

## 2019-01-16 PROCEDURE — 85025 COMPLETE CBC W/AUTO DIFF WBC: CPT | Performed by: INTERNAL MEDICINE

## 2019-01-16 PROCEDURE — 86140 C-REACTIVE PROTEIN: CPT | Performed by: PHYSICIAN ASSISTANT

## 2019-01-16 RX ORDER — HEPARIN SODIUM (PORCINE) LOCK FLUSH IV SOLN 100 UNIT/ML 100 UNIT/ML
5 SOLUTION INTRAVENOUS
Status: COMPLETED | OUTPATIENT
Start: 2019-01-16 | End: 2019-01-16

## 2019-01-16 RX ORDER — METHYLPREDNISOLONE SODIUM SUCCINATE 125 MG/2ML
125 INJECTION, POWDER, LYOPHILIZED, FOR SOLUTION INTRAMUSCULAR; INTRAVENOUS
Status: CANCELLED
Start: 2019-01-16

## 2019-01-16 RX ORDER — ALBUTEROL SULFATE 0.83 MG/ML
2.5 SOLUTION RESPIRATORY (INHALATION)
Status: CANCELLED | OUTPATIENT
Start: 2019-01-16

## 2019-01-16 RX ORDER — HEPARIN SODIUM (PORCINE) LOCK FLUSH IV SOLN 100 UNIT/ML 100 UNIT/ML
500 SOLUTION INTRAVENOUS ONCE
Status: COMPLETED | OUTPATIENT
Start: 2019-01-16 | End: 2019-01-16

## 2019-01-16 RX ORDER — HEPARIN SODIUM (PORCINE) LOCK FLUSH IV SOLN 100 UNIT/ML 100 UNIT/ML
500 SOLUTION INTRAVENOUS ONCE
Status: CANCELLED | OUTPATIENT
Start: 2019-01-16 | End: 2019-01-16

## 2019-01-16 RX ORDER — EPINEPHRINE 1 MG/ML
0.3 INJECTION, SOLUTION INTRAMUSCULAR; SUBCUTANEOUS EVERY 5 MIN PRN
Status: CANCELLED | OUTPATIENT
Start: 2019-01-16

## 2019-01-16 RX ORDER — EPINEPHRINE 0.3 MG/.3ML
0.3 INJECTION SUBCUTANEOUS EVERY 5 MIN PRN
Status: CANCELLED | OUTPATIENT
Start: 2019-01-16

## 2019-01-16 RX ORDER — ZOLPIDEM TARTRATE 10 MG/1
10 TABLET ORAL
Qty: 60 TABLET | Refills: 1 | Status: SHIPPED | OUTPATIENT
Start: 2019-01-16 | End: 2019-05-20

## 2019-01-16 RX ORDER — DIPHENOXYLATE HCL/ATROPINE 2.5-.025MG
1-2 TABLET ORAL 4 TIMES DAILY PRN
Qty: 100 TABLET | Refills: 1 | Status: SHIPPED | OUTPATIENT
Start: 2019-01-16 | End: 2019-07-22

## 2019-01-16 RX ORDER — DIPHENHYDRAMINE HYDROCHLORIDE 50 MG/ML
50 INJECTION INTRAMUSCULAR; INTRAVENOUS
Status: CANCELLED
Start: 2019-01-16

## 2019-01-16 RX ORDER — SODIUM CHLORIDE 9 MG/ML
1000 INJECTION, SOLUTION INTRAVENOUS CONTINUOUS PRN
Status: CANCELLED
Start: 2019-01-16

## 2019-01-16 RX ORDER — LORAZEPAM 2 MG/ML
0.5 INJECTION INTRAMUSCULAR EVERY 4 HOURS PRN
Status: CANCELLED
Start: 2019-01-16

## 2019-01-16 RX ORDER — MEPERIDINE HYDROCHLORIDE 25 MG/ML
25 INJECTION INTRAMUSCULAR; INTRAVENOUS; SUBCUTANEOUS EVERY 30 MIN PRN
Status: CANCELLED | OUTPATIENT
Start: 2019-01-16

## 2019-01-16 RX ORDER — ALBUTEROL SULFATE 90 UG/1
1-2 AEROSOL, METERED RESPIRATORY (INHALATION)
Status: CANCELLED
Start: 2019-01-16

## 2019-01-16 RX ADMIN — HEPARIN 5 ML: 100 SYRINGE at 13:30

## 2019-01-16 RX ADMIN — SODIUM CHLORIDE 200 MG: 9 INJECTION, SOLUTION INTRAVENOUS at 16:16

## 2019-01-16 RX ADMIN — SODIUM CHLORIDE 250 ML: 9 INJECTION, SOLUTION INTRAVENOUS at 16:16

## 2019-01-16 RX ADMIN — Medication 500 UNITS: at 16:57

## 2019-01-16 ASSESSMENT — PAIN SCALES - GENERAL
PAINLEVEL: MILD PAIN (3)
PAINLEVEL: MILD PAIN (2)

## 2019-01-16 NOTE — PROGRESS NOTES
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 His tumor is positive for cytokeratin 20, negative for P63 and CK7, and HER2 is negative. He started on FOLFOX (5FU/oxaliplatin) on 5/15/2017. He had a delay in treatment from 9/5-10/2/17 due to work related injury.    He had an excellent response by imaging throughout the summer 2017.    He a mixed response by imaging in late fall 2017.    In January 2018, he was switched to taxol and cyramza - had slight progression and neuropathy and clinical trial became available.       In March 2018, he was started on the [facility] Match Clinical Trial with crizotinib.  (MET amplification) He remained on crizotinib from March - July 2018 and then was found to have progressive disease.  In August 2018, he was switched back to taxol/cyramza.  In October, he was switched to Keytruda (PD1 overexpressor). At his last visit with Dr Dee, CT showed slightly larger LAD but a stable distal esophageal mass.  Given clinical stability, it was continued.     Levi is here for Cycle 5 today.     INTERVAL HISTORY: Levi comes in today for followup with his sister. He is tolerating the Keytruda well, he has some fatigue and some joint pain in his bilateral shoulders and hands.  There is no swelling or redness, but just stiffness/pain.  He is not routinely taking narcotics, but taking Tylenol with little help.  He has had no issues with fevers or chills, no chest pain or shortness of breath, no nausea, vomiting, or constipation. Still taking Reglan 2-3 times a day but unsure if he needs it. Have softer/mushy stools, denies watery diarrhea.      He stopped doing enteral feeds and had his PEG removed.  He is eating all oral food- all solids, just avoiding bread.  His weight has dropped but he is  clearing getting in over 2000 hugh a day (burgers, pizza, etc no limitations except bread).  Hydrating well.  No bleeding concerns on the lovenox however has been using up his old 130 mg BID lovenox shots but gave himself the 100 this morning.       His energy levels are good.         ROS: 10 point ROS neg other than the symptoms noted above in the HPI.    PHYSICAL EXAMINATION  /80 (BP Location: Right arm, Patient Position: Sitting, Cuff Size: Adult Large)   Pulse 108   Temp 98.6  F (37  C) (Oral)   Resp 16   Wt 145.2 kg (320 lb 1.6 oz)   SpO2 97%   BMI 37.00 kg/m     Wt Readings from Last 4 Encounters:   01/16/19 145.2 kg (320 lb 1.6 oz)   12/27/18 149.8 kg (330 lb 4.8 oz)   12/17/18 (!) 154.2 kg (340 lb)   12/03/18 149.8 kg (330 lb 4 oz)     Constitutional: Alert, oriented male in no visible distress.  Eyes: PERRL. Anicteric sclerae.  ENT/Mouth: OM moist and pink without lesions or thrush.  CV: RRR  Resp: CTAB throughout  Abdomen: Soft, non-tender, non-distended. Obese. Bowel sounds present. Unable to palpate liver or spleen. Healed old G tube sight  Extremities: trace edema , no erythema or warmth over joints  Skin: Warm, dry.   Lymph: No cervical or supraclavicular lymphadenopathy appreciated.   Neuro: CN II-XII grossly intact.       ECOG PS: 1          1/16/2019 13:44   Sodium 137   Potassium 3.9   Chloride 110 (H)   Carbon Dioxide 20   Urea Nitrogen 15   Creatinine 0.90   GFR Estimate >90   GFR Estimate If Black >90   Calcium 8.4 (L)   Anion Gap 8   Albumin 3.5   Protein Total 7.4   Bilirubin Total 1.0   Alkaline Phosphatase 91   ALT 36   AST 27   Bilirubin Direct 0.3 (H)   CRP Inflammation 6.7   Glucose 100 (H)   WBC 5.5   Hemoglobin 14.0   Hematocrit 41.6   Platelet Count 127 (L)   RBC Count 4.86   MCV 86      1/16/2019 13:44   Sed Rate 10      1/16/2019 13:40   Heparin 10A Level 0.54     IMPRESSION/PLAN:  1. Metastatic esophogeal adenocarcinoma. He has had treatment with FOLFOX and then  "transitioned to Taxol with ramicurimab.  He was then on crizotinib on the [employer] Match study.  He progressed on this and returned on taxol/cyramza. And is now on Keytruda.  His CT scan showed some mild worsening of his LAD, but a stable distal esophageal mass.  He is tolerating well with some loose stools and arthralgias (see below).  Plan is to continue on for another couple months and then repeat CT CAP in March -OK for C5    2. Arthralgias, likely immune related: mostly in the bilateral shoulders and hands.  CRP and ESR negative.  Medrol helped only while he was taking the medications with no lingering effect.  Taking Tylenol, cannot take NSAIDS due to anticoagulation.  Encouraged heat, topical lidocaine patches, capsicin cream and ROM exercises.      3. GI; mushy stools, no watery diarrhea. Could be from immunotherapy, however encouraged a trial of cutting back on his Reglan which can also cause loose stools.  Continue prn Lomotil (taking 2-4 intermittently)     4. A history of PE.  He is on Lovenox twice daily.  He has progressed through Xarelto in the past, although Levi's story is he actually wasn't taking it. His Xa was marginally subtherapeutic today at 0.54.  He was taking the 130 mg syringe and \"wasting\" enough to get him to 100 mg and then switched to the 100 mg syring yesterday.  I called him on the phone and we decided to have him stay on the 100 mg syringes given he may have been wasting too much on the prior syringes and we'll recheck him in 3 weeks.       5. He does have baseline neuropathy and is on gabapentin 600/300/600 mg daily.   No change.       6. Dysphagia with associated gastroparesis by endoscopy. Overall improving.  He had his PEG removed and is eating all types of foods, except breads.  His weight is down ~10 pounds but he has been hungry and eating quite a bit.  After discussion on intake, it seems he is getting appropriate calories, will continue to closely monitor.  Encouraged TID " meals and small snacks during the day.     Nicole Sutherland PA-C

## 2019-01-16 NOTE — PATIENT INSTRUCTIONS
Contact Numbers  [facility] Nurse Triage: [phone]  After Hours Nurse Line:  [phone]     Please call the [facility] Triage line if you experience a temperature greater than or equal to 100.5, shaking chills, have uncontrolled nausea, vomiting and/or diarrhea, dizziness, shortness of breath, chest pain, bleeding, unexplained bruising, or if you have any other new/concerning symptoms, questions or concerns.      If it is after hours, weekends, or holidays, please call either the after hours nurse line listed above.      If you are having any concerning symptoms or wish to speak to a provider before your next infusion visit, please call your care coordinator or triage to notify them so we can adequately serve you.      If you need a refill on a narcotic prescription or other medication, please call triage before your infusion appointment.       January 2019 Sunday Monday Tuesday Wednesday Thursday Friday Saturday             1    LAB   6:00 AM   (15 min.)    LAB HOME INFUSION   [facility], Laboratory Services 2     3     4     5       6     7     8     9     10     11     12       13     14     15     16    [facility] LAB DRAW   1:15 PM   (15 min.)    [facility] DRAW   [facility] Lab Draw    P RETURN   1:55 PM   (50 min.)   [facility]or[facility]utherland [facility] 60   3:00 PM   (60 min.)    ONCOLOGY INFUSION   [facility] 17     18     19       20     21     22     23     24     25     26       27     28     29     30     31                           February 2019 Sunday Monday Tuesday Wednesday Thursday Friday Saturday                            1     2       3     4     5    [facility] DRAW  11:45 AM   (15 min.)    [facility] DRAW   [facility] Lab Draw    [facility] RETURN  12:15 PM   (50 min.)   Loraine Sutherland PA-C M [facility] ONC INFUSION 60   2:00 PM   (60 min.)     ONCOLOGY INFUSION   [facility] 6     7     8     9       10     11     12     13     14     15     16       17     18     19     20     21     22     23       24     25    [facility] LAB DRAW   8:45 AM   (15 min.)   [facility] LAB DRAW   [facility] Lab Draw    [facility] RETURN   9:15 AM   (30 min.)   Kadi Dee MD   [facility] ONC INFUSION 60  10:00 AM   (60 min.)    ONCOLOGY INFUSION   [facility] 26     27     28                               Lab Results:  Recent Results (from the past 12 hour(s))   Heparin 10a Level    Collection Time: 01/16/19  1:40 PM   Result Value Ref Range    Heparin 10A Level 0.54 IU/mL   CBC with platelets differential    Collection Time: 01/16/19  1:44 PM   Result Value Ref Range    WBC 5.5 4.0 - 11.0 10e9/L    RBC Count 4.86 4.4 - 5.9 10e12/L    Hemoglobin 14.0 13.3 - 17.7 g/dL    Hematocrit 41.6 40.0 - 53.0 %    MCV 86 78 - 100 fl    MCH 28.8 26.5 - 33.0 pg    MCHC 33.7 31.5 - 36.5 g/dL    RDW 15.6 (H) 10.0 - 15.0 %    Platelet Count 127 (L) 150 - 450 10e9/L    Diff Method Automated Method     % Neutrophils 74.3 %    % Lymphocytes 16.8 %    % Monocytes 7.2 %    % Eosinophils 0.9 %    % Basophils 0.4 %    % Immature Granulocytes 0.4 %    Nucleated RBCs 0 0 /100    Absolute Neutrophil 4.1 1.6 - 8.3 10e9/L    Absolute Lymphocytes 0.9 0.8 - 5.3 10e9/L    Absolute Monocytes 0.4 0.0 - 1.3 10e9/L    Absolute Eosinophils 0.1 0.0 - 0.7 10e9/L    Absolute Basophils 0.0 0.0 - 0.2 10e9/L    Abs Immature Granulocytes 0.0 0 - 0.4 10e9/L    Absolute Nucleated RBC 0.0    Comprehensive metabolic panel    Collection Time: 01/16/19  1:44 PM   Result Value Ref Range    Sodium 137 133 - 144 mmol/L    Potassium 3.9 3.4 - 5.3 mmol/L    Chloride 110 (H) 94 - 109 mmol/L    Carbon Dioxide 20 20 - 32 mmol/L    Anion Gap 8 3 - 14 mmol/L    Glucose 100 (H) 70 - 99 mg/dL    Urea Nitrogen 15 7 - 30 mg/dL    Creatinine 0.90 0.66 - 1.25 mg/dL     GFR Estimate >90 >60 mL/min/[1.73_m2]    GFR Estimate If Black >90 >60 mL/min/[1.73_m2]    Calcium 8.4 (L) 8.5 - 10.1 mg/dL    Bilirubin Total 1.0 0.2 - 1.3 mg/dL    Albumin 3.5 3.4 - 5.0 g/dL    Protein Total 7.4 6.8 - 8.8 g/dL    Alkaline Phosphatase 91 40 - 150 U/L    ALT 36 0 - 70 U/L    AST 27 0 - 45 U/L   Bilirubin direct    Collection Time: 01/16/19  1:44 PM   Result Value Ref Range    Bilirubin Direct 0.3 (H) 0.0 - 0.2 mg/dL   Erythrocyte sedimentation rate auto    Collection Time: 01/16/19  1:44 PM   Result Value Ref Range    Sed Rate 10 0 - 20 mm/h   CRP inflammation    Collection Time: 01/16/19  1:44 PM   Result Value Ref Range    CRP Inflammation 6.7 0.0 - 8.0 mg/L

## 2019-01-16 NOTE — PROGRESS NOTES
Infusion Nursing Note:  Reuben Padilla presents for C5D1 Keytruda  Met with SERGIO Barker before infusion.      Treatment Conditions:  Lab Results   Component Value Date    HGB 14.0 01/16/2019     Lab Results   Component Value Date    WBC 5.5 01/16/2019      Lab Results   Component Value Date    ANEU 4.1 01/16/2019     Lab Results   Component Value Date     01/16/2019      Lab Results   Component Value Date     01/16/2019                   Lab Results   Component Value Date    POTASSIUM 3.9 01/16/2019           Lab Results   Component Value Date    MAG 1.9 11/07/2018            Lab Results   Component Value Date    CR 0.90 01/16/2019                   Lab Results   Component Value Date    NIDHI 8.4 01/16/2019                Lab Results   Component Value Date    BILITOTAL 1.0 01/16/2019           Lab Results   Component Value Date    ALBUMIN 3.5 01/16/2019                    Lab Results   Component Value Date    ALT 36 01/16/2019           Lab Results   Component Value Date    AST 27 01/16/2019       Results reviewed, labs MET treatment parameters, ok to proceed with treatment.    Intravenous Access:  Implanted Port.    Post Infusion Assessment:  Patient tolerated infusion without incident.  Blood return noted pre and post infusion.  Site patent and intact, free from redness, edema or discomfort.  No evidence of extravasations.  Access discontinued per protocol.    Discharge Plan:   Patient declined prescription refills. Will fill at his home pharmacy  Patient and/or family verbalized understanding of discharge instructions and all questions answered.  Copy of AVS reviewed with patient and/or family.  Patient will return 2/5 for next appointment.  Patient discharged in stable condition accompanied by: friend.    Valentina Gamez RN

## 2019-01-16 NOTE — Clinical Note
1/16/2019       RE: Reuben Padilla  [address] [place] [id number]     Dear Colleague,    Thank you for referring your patient, Reuben Padilla, to the [facility]. Please see a copy of my visit note below.        Again, thank you for allowing me to participate in the care of your patient.      Sincerely,    Loraine Sutherland PA-C

## 2019-01-16 NOTE — NURSING NOTE
"Oncology Rooming Note    January 16, 2019 2:21 PM   Reuben Padilla is a 58 year old male who presents for:    Chief Complaint   Patient presents with     Port Draw     labs drawn from port by rn.  vs taken     Oncology Clinic Visit     Esophagel CA; Active Tx     Initial Vitals: /80 (BP Location: Right arm, Patient Position: Sitting, Cuff Size: Adult Large)   Pulse 108   Temp 98.6  F (37  C) (Oral)   Resp 16   Wt 145.2 kg (320 lb 1.6 oz)   SpO2 97%   BMI 37.00 kg/m   Estimated body mass index is 37 kg/m  as calculated from the following:    Height as of 12/17/18: 1.981 m (6' 5.99\").    Weight as of this encounter: 145.2 kg (320 lb 1.6 oz). Body surface area is 2.83 meters squared.  Mild Pain (2) Comment: dull ache   No LMP for male patient.  Allergies reviewed: Yes  Medications reviewed: Yes    Medications: Medication refills not needed today.  Pharmacy name entered into [employer]:    [id number] [place] [address] AT INTERSECTION [address] [place], [place] - [address] [facility] [phone]  [id number] - [place], [place] - [address]    Clinical concerns: Sleep concerns and increased diarrhea.  Nicole was notified.    8 minutes for nursing intake (face to face time)     Piedad Smith CMA              "

## 2019-01-18 NOTE — PROGRESS NOTES
This is a recent snapshot of the patient's [facility] medical record.  For current drug dose and complete information and questions, call [phone] or In Basket pool, fv home infusion ([id number])  CSN Number:  [id number]

## 2019-01-21 DIAGNOSIS — I26.99 ACUTE PULMONARY EMBOLISM (H): Primary | ICD-10-CM

## 2019-01-21 DIAGNOSIS — C15.5 CANCER OF DISTAL THIRD OF ESOPHAGUS (H): ICD-10-CM

## 2019-02-05 ENCOUNTER — INFUSION THERAPY VISIT (OUTPATIENT)
Dept: ONCOLOGY | Facility: CLINIC | Age: 59
End: 2019-02-05
Attending: INTERNAL MEDICINE
Payer: COMMERCIAL

## 2019-02-05 ENCOUNTER — ONCOLOGY VISIT (OUTPATIENT)
Dept: ONCOLOGY | Facility: CLINIC | Age: 59
End: 2019-02-05
Attending: PHYSICIAN ASSISTANT
Payer: COMMERCIAL

## 2019-02-05 VITALS
DIASTOLIC BLOOD PRESSURE: 81 MMHG | RESPIRATION RATE: 20 BRPM | BODY MASS INDEX: 36.18 KG/M2 | SYSTOLIC BLOOD PRESSURE: 123 MMHG | HEIGHT: 78 IN | HEART RATE: 72 BPM | OXYGEN SATURATION: 97 % | TEMPERATURE: 97.8 F | WEIGHT: 312.7 LBS

## 2019-02-05 DIAGNOSIS — D70.1 CHEMOTHERAPY-INDUCED NEUTROPENIA (H): ICD-10-CM

## 2019-02-05 DIAGNOSIS — G62.0 CHEMOTHERAPY-INDUCED NEUROPATHY (H): ICD-10-CM

## 2019-02-05 DIAGNOSIS — T45.1X5A CHEMOTHERAPY-INDUCED NEUROPATHY (H): ICD-10-CM

## 2019-02-05 DIAGNOSIS — T45.1X5A CHEMOTHERAPY-INDUCED NEUTROPENIA (H): ICD-10-CM

## 2019-02-05 DIAGNOSIS — C15.5 CANCER OF DISTAL THIRD OF ESOPHAGUS (H): Primary | ICD-10-CM

## 2019-02-05 DIAGNOSIS — C15.5 CANCER OF DISTAL THIRD OF ESOPHAGUS (H): ICD-10-CM

## 2019-02-05 DIAGNOSIS — R19.7 DIARRHEA, UNSPECIFIED TYPE: Primary | ICD-10-CM

## 2019-02-05 LAB
ALBUMIN SERPL-MCNC: 3.6 G/DL (ref 3.4–5)
ALP SERPL-CCNC: 80 U/L (ref 40–150)
ALT SERPL W P-5'-P-CCNC: 36 U/L (ref 0–70)
ANION GAP SERPL CALCULATED.3IONS-SCNC: 8 MMOL/L (ref 3–14)
AST SERPL W P-5'-P-CCNC: 23 U/L (ref 0–45)
BASOPHILS # BLD AUTO: 0 10E9/L (ref 0–0.2)
BASOPHILS NFR BLD AUTO: 0.5 %
BILIRUB SERPL-MCNC: 0.6 MG/DL (ref 0.2–1.3)
BUN SERPL-MCNC: 14 MG/DL (ref 7–30)
CALCIUM SERPL-MCNC: 8.5 MG/DL (ref 8.5–10.1)
CHLORIDE SERPL-SCNC: 116 MMOL/L (ref 94–109)
CO2 SERPL-SCNC: 19 MMOL/L (ref 20–32)
CREAT SERPL-MCNC: 0.92 MG/DL (ref 0.66–1.25)
DIFFERENTIAL METHOD BLD: ABNORMAL
EOSINOPHIL # BLD AUTO: 0 10E9/L (ref 0–0.7)
EOSINOPHIL NFR BLD AUTO: 0.5 %
ERYTHROCYTE [DISTWIDTH] IN BLOOD BY AUTOMATED COUNT: 16.2 % (ref 10–15)
GFR SERPL CREATININE-BSD FRML MDRD: >90 ML/MIN/{1.73_M2}
GLUCOSE SERPL-MCNC: 95 MG/DL (ref 70–99)
HCT VFR BLD AUTO: 41.4 % (ref 40–53)
HGB BLD-MCNC: 13.5 G/DL (ref 13.3–17.7)
IMM GRANULOCYTES # BLD: 0 10E9/L (ref 0–0.4)
IMM GRANULOCYTES NFR BLD: 0.3 %
LMWH PPP CHRO-ACNC: 0.71 IU/ML
LYMPHOCYTES # BLD AUTO: 1.1 10E9/L (ref 0.8–5.3)
LYMPHOCYTES NFR BLD AUTO: 28.1 %
MCH RBC QN AUTO: 28.8 PG (ref 26.5–33)
MCHC RBC AUTO-ENTMCNC: 32.6 G/DL (ref 31.5–36.5)
MCV RBC AUTO: 88 FL (ref 78–100)
MONOCYTES # BLD AUTO: 0.3 10E9/L (ref 0–1.3)
MONOCYTES NFR BLD AUTO: 6.6 %
NEUTROPHILS # BLD AUTO: 2.5 10E9/L (ref 1.6–8.3)
NEUTROPHILS NFR BLD AUTO: 64 %
NRBC # BLD AUTO: 0 10*3/UL
NRBC BLD AUTO-RTO: 0 /100
PLATELET # BLD AUTO: 154 10E9/L (ref 150–450)
POTASSIUM SERPL-SCNC: 3.8 MMOL/L (ref 3.4–5.3)
PROT SERPL-MCNC: 7.3 G/DL (ref 6.8–8.8)
RBC # BLD AUTO: 4.69 10E12/L (ref 4.4–5.9)
SODIUM SERPL-SCNC: 143 MMOL/L (ref 133–144)
TSH SERPL DL<=0.005 MIU/L-ACNC: 1.39 MU/L (ref 0.4–4)
WBC # BLD AUTO: 3.9 10E9/L (ref 4–11)

## 2019-02-05 PROCEDURE — 84443 ASSAY THYROID STIM HORMONE: CPT | Performed by: PHYSICIAN ASSISTANT

## 2019-02-05 PROCEDURE — 80053 COMPREHEN METABOLIC PANEL: CPT | Performed by: PHYSICIAN ASSISTANT

## 2019-02-05 PROCEDURE — G0463 HOSPITAL OUTPT CLINIC VISIT: HCPCS | Mod: ZF

## 2019-02-05 PROCEDURE — 85025 COMPLETE CBC W/AUTO DIFF WBC: CPT | Performed by: PHYSICIAN ASSISTANT

## 2019-02-05 PROCEDURE — 25000128 H RX IP 250 OP 636: Mod: ZF | Performed by: PHYSICIAN ASSISTANT

## 2019-02-05 PROCEDURE — 85520 HEPARIN ASSAY: CPT | Performed by: PHYSICIAN ASSISTANT

## 2019-02-05 PROCEDURE — 99214 OFFICE O/P EST MOD 30 MIN: CPT | Mod: ZP | Performed by: PHYSICIAN ASSISTANT

## 2019-02-05 PROCEDURE — 96413 CHEMO IV INFUSION 1 HR: CPT

## 2019-02-05 RX ORDER — HEPARIN SODIUM (PORCINE) LOCK FLUSH IV SOLN 100 UNIT/ML 100 UNIT/ML
5 SOLUTION INTRAVENOUS EVERY 8 HOURS
Status: DISCONTINUED | OUTPATIENT
Start: 2019-02-05 | End: 2019-02-05 | Stop reason: HOSPADM

## 2019-02-05 RX ORDER — ALBUTEROL SULFATE 0.83 MG/ML
2.5 SOLUTION RESPIRATORY (INHALATION)
Status: CANCELLED | OUTPATIENT
Start: 2019-02-05

## 2019-02-05 RX ORDER — HEPARIN SODIUM (PORCINE) LOCK FLUSH IV SOLN 100 UNIT/ML 100 UNIT/ML
500 SOLUTION INTRAVENOUS ONCE
Status: CANCELLED | OUTPATIENT
Start: 2019-02-05 | End: 2019-02-05

## 2019-02-05 RX ORDER — EPINEPHRINE 1 MG/ML
0.3 INJECTION, SOLUTION INTRAMUSCULAR; SUBCUTANEOUS EVERY 5 MIN PRN
Status: CANCELLED | OUTPATIENT
Start: 2019-02-05

## 2019-02-05 RX ORDER — ALBUTEROL SULFATE 90 UG/1
1-2 AEROSOL, METERED RESPIRATORY (INHALATION)
Status: CANCELLED
Start: 2019-02-05

## 2019-02-05 RX ORDER — LORAZEPAM 2 MG/ML
0.5 INJECTION INTRAMUSCULAR EVERY 4 HOURS PRN
Status: CANCELLED
Start: 2019-02-05

## 2019-02-05 RX ORDER — MEPERIDINE HYDROCHLORIDE 25 MG/ML
25 INJECTION INTRAMUSCULAR; INTRAVENOUS; SUBCUTANEOUS EVERY 30 MIN PRN
Status: CANCELLED | OUTPATIENT
Start: 2019-02-05

## 2019-02-05 RX ORDER — LORAZEPAM 0.5 MG/1
0.5 TABLET ORAL EVERY 4 HOURS PRN
Qty: 30 TABLET | Refills: 2 | Status: SHIPPED | OUTPATIENT
Start: 2019-02-05 | End: 2019-09-23

## 2019-02-05 RX ORDER — HEPARIN SODIUM (PORCINE) LOCK FLUSH IV SOLN 100 UNIT/ML 100 UNIT/ML
500 SOLUTION INTRAVENOUS ONCE
Status: COMPLETED | OUTPATIENT
Start: 2019-02-05 | End: 2019-02-05

## 2019-02-05 RX ORDER — DIPHENHYDRAMINE HYDROCHLORIDE 50 MG/ML
50 INJECTION INTRAMUSCULAR; INTRAVENOUS
Status: CANCELLED
Start: 2019-02-05

## 2019-02-05 RX ORDER — GABAPENTIN 300 MG/1
CAPSULE ORAL
Qty: 150 CAPSULE | Refills: 5 | Status: SHIPPED | OUTPATIENT
Start: 2019-02-05 | End: 2019-08-07

## 2019-02-05 RX ORDER — SODIUM CHLORIDE 9 MG/ML
1000 INJECTION, SOLUTION INTRAVENOUS CONTINUOUS PRN
Status: CANCELLED
Start: 2019-02-05

## 2019-02-05 RX ORDER — METHYLPREDNISOLONE SODIUM SUCCINATE 125 MG/2ML
125 INJECTION, POWDER, LYOPHILIZED, FOR SOLUTION INTRAMUSCULAR; INTRAVENOUS
Status: CANCELLED
Start: 2019-02-05

## 2019-02-05 RX ORDER — EPINEPHRINE 0.3 MG/.3ML
0.3 INJECTION SUBCUTANEOUS EVERY 5 MIN PRN
Status: CANCELLED | OUTPATIENT
Start: 2019-02-05

## 2019-02-05 RX ADMIN — HEPARIN SODIUM (PORCINE) LOCK FLUSH IV SOLN 100 UNIT/ML 5 ML: 100 SOLUTION at 12:21

## 2019-02-05 RX ADMIN — SODIUM CHLORIDE 250 ML: 9 INJECTION, SOLUTION INTRAVENOUS at 14:28

## 2019-02-05 RX ADMIN — HEPARIN 500 UNITS: 100 SYRINGE at 15:07

## 2019-02-05 RX ADMIN — SODIUM CHLORIDE 200 MG: 900 INJECTION, SOLUTION INTRAVENOUS at 14:29

## 2019-02-05 ASSESSMENT — MIFFLIN-ST. JEOR: SCORE: 2371.49

## 2019-02-05 ASSESSMENT — PAIN SCALES - GENERAL: PAINLEVEL: MODERATE PAIN (4)

## 2019-02-05 NOTE — NURSING NOTE
Chief Complaint   Patient presents with     Oncology Clinic Visit     Return visit; Esophageal CA      Port Draw     Port labs collected by RN.

## 2019-02-05 NOTE — PROGRESS NOTES
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 His tumor is positive for cytokeratin 20, negative for P63 and CK7, and HER2 is negative. He started on FOLFOX (5FU/oxaliplatin) on 5/15/2017. He had a delay in treatment from 9/5-10/2/17 due to work related injury.    He had an excellent response by imaging throughout the summer 2017.    He a mixed response by imaging in late fall 2017.    In January 2018, he was switched to taxol and cyramza - had slight progression and neuropathy and clinical trial became available.       In March 2018, he was started on the [facility] Match Clinical Trial with crizotinib.  (MET amplification) He remained on crizotinib from March - July 2018 and then was found to have progressive disease.  In August 2018, he was switched back to taxol/cyramza.  In October, he was switched to Keytruda (PD1 overexpressor). At his last visit with Dr Dee, CT showed slightly larger LAD but a stable distal esophageal mass.  Given clinical stability, it was continued.     Levi is here for Cycle 6 today.     INTERVAL HISTORY: Levi comes in today for followup with his sister. He is tolerating the Keytruda well, he has some fatigue and some joint pain in his bilateral shoulders and hands.  There is no swelling or redness, but just stiffness/pain.  He is not routinely taking narcotics, but taking Tylenol with little help.  He has had no issues with fevers or chills, no chest pain or shortness of breath, no nausea, vomiting, or constipation. Still taking Reglan 2-3 times a day but unsure if he needs it. Have watery/mushy stools.  States he was taking Lomotil but finds it does nothing.  [place] actually took 4 BID with still 1-3 loose stools.  No dizziness or dehydration.       He is eating  "all oral food- all solids, just avoiding bread.  His weight has dropped but he is clearing getting in over 2000 hugh a day (burgers, pizza, etc no limitations except bread).  States the food in the [place] was only \"so-so\" and that he had to walk a far way to get to the restaurants so sometimes he chose not to eat.   No bleeding concerns on the lovenox 100 mg BID>     His energy levels are good.         ROS: 10 point ROS neg other than the symptoms noted above in the HPI.    PHYSICAL EXAMINATION  /81 (BP Location: Right arm, Patient Position: Sitting)   Pulse 72   Temp 97.8  F (36.6  C) (Oral)   Resp 20   Ht 1.981 m (6' 5.99\")   Wt 141.8 kg (312 lb 11.2 oz)   SpO2 97%   BMI 36.15 kg/m     Wt Readings from Last 4 Encounters:   02/05/19 141.8 kg (312 lb 11.2 oz)   01/16/19 145.2 kg (320 lb 1.6 oz)   12/27/18 149.8 kg (330 lb 4.8 oz)   12/17/18 (!) 154.2 kg (340 lb)     Constitutional: Alert, oriented male in no visible distress.  Eyes: PERRL. Anicteric sclerae.  ENT/Mouth: OM moist and pink without lesions or thrush.  CV: RRR  Resp: CTAB throughout  Abdomen: Soft, non-tender, non-distended. Obese. Bowel sounds present. Unable to palpate liver or spleen. Healed old G tube sight  Extremities: trace edema , no erythema or warmth over joints  Skin: Warm, dry.   Lymph: No cervical or supraclavicular lymphadenopathy appreciated.   Neuro: CN II-XII grossly intact.       ECOG PS: 1        12/27/2018 16:21 1/16/2019 13:44 2/5/2019 12:21   Sodium 139 137 143   Potassium 4.3 3.9 3.8   Chloride 109 110 (H) 116 (H)   Carbon Dioxide 25 20 19 (L)   Urea Nitrogen 14 15 14   Creatinine 0.78 0.90 0.92   GFR Estimate >90 >90 >90   GFR Estimate If Black >90 >90 >90   Calcium 8.6 8.4 (L) 8.5   Anion Gap 5 8 8   Albumin 3.5 3.5 3.6   Protein Total 7.5 7.4 7.3   Bilirubin Total 0.5 1.0 0.6   Alkaline Phosphatase 91 91 80   ALT 34 36 36   AST 24 27 23   Bilirubin Direct 0.1 0.3 (H)    CRP Inflammation  6.7    TSH 2.09   "   Glucose 119 (H) 100 (H) 95   WBC 3.6 (L) 5.5 3.9 (L)   Hemoglobin 13.2 (L) 14.0 13.5   Hematocrit 41.5 41.6 41.4   Platelet Count 143 (L) 127 (L) 154   RBC Count 4.66 4.86 4.69   MCV 89 86 88         IMPRESSION/PLAN:  1. Metastatic esophogeal adenocarcinoma. He has had treatment with FOLFOX and then transitioned to Taxol with ramicurimab.  He was then on crizotinib on the [facility] Match study.  He progressed on this and returned on taxol/cyramza. And is now on Keytruda.  His CT scan showed some mild worsening of his LAD, but a stable distal esophageal mass.  He is tolerating well with some loose stools and arthralgias (see below).  Plan is to continue on for another couple months and then repeat CT CAP in March  -OK for C6  -discussed with Dr Dee, will obtain CT CAP prior to their next visit    2. Arthralgias, likely immune related: mostly in the bilateral shoulders and hands.  CRP and ESR negative.  Medrol helped only while he was taking the medications with no lingering effect.  Taking Tylenol, cannot take NSAIDS due to anticoagulation.  Encouraged heat, topical lidocaine patches, capsicin cream and ROM exercises.      3. GI; ongoing diarrhea.  He was completely unable to know if the Keytruda is making it worse- just that its ongoing forever.  Lomotil not working well.  He describes it as more watery- thus we'll get a C diff test to rule out infection.  Otherwise, this may be immune related. Last CT didn't show colitis, no current pain.  Encouraged imodium 4-8 a day.      4. A history of PE.  He is on Lovenox twice daily.  He has progressed through Xarelto in the past, although Levi's story is he actually wasn't taking it. His Xa was marginally subtherapeutic today at 0.54 at last visit but due to his ongoing weight loss, we decided to check it again today--still pending. Will follow up by phone regarding Xa level       5. He does have baseline neuropathy and is on gabapentin 600/300/600 mg daily.   No change.        6. Dysphagia with associated gastroparesis by endoscopy. Overall improving.  He had his PEG removed and is eating all types of foods, except breads.  His weight is down ANOTHER ~10 pounds but he has been hungry and eating quite a bit.  After discussion on intake, most days he is eating >2000 hugh a day.  However somedays he is not eating intentionally due to diarrhea.  He is really happy losing this weight, but again I stressed its too much too soon and strongly encouraged him to take in Boost ~2 a day in addition to eating.     Nicole Sutherland PA-C

## 2019-02-05 NOTE — PROGRESS NOTES
Infusion Nursing Note:    Patient presents today for Cycle 6 Day 1 Keytruda.  Arrived with .  Patient met with Nicole prior to infusion.    Lab Results   Component Value Date    HGB 13.5 02/05/2019     Lab Results   Component Value Date    WBC 3.9 02/05/2019      Lab Results   Component Value Date    ANEU 2.5 02/05/2019     Lab Results   Component Value Date     02/05/2019      Lab Results   Component Value Date     02/05/2019                   Lab Results   Component Value Date    POTASSIUM 3.8 02/05/2019           Lab Results   Component Value Date    MAG 1.9 11/07/2018            Lab Results   Component Value Date    CR 0.92 02/05/2019                   Lab Results   Component Value Date    NIDHI 8.5 02/05/2019                Lab Results   Component Value Date    BILITOTAL 0.6 02/05/2019           Lab Results   Component Value Date    ALBUMIN 3.6 02/05/2019                    Lab Results   Component Value Date    ALT 36 02/05/2019           Lab Results   Component Value Date    AST 23 02/05/2019       Results reviewed, labs MET treatment parameters, ok to proceed with treatment.    Note: N/A.    Intravenous Access:  Implanted Port.    Post Infusion Assessment:  Patient tolerated infusion without incident.  Blood return noted pre and post infusion.  Access discontinued per protocol.    Discharge Plan:   Patient declined prescription refills.  AVS to patient via [employer].  Patient will return 2/25 for next appointment.   Patient discharged in stable condition accompanied by: sister.  Departure Mode: Ambulatory.

## 2019-02-05 NOTE — NURSING NOTE
"Oncology Rooming Note    February 5, 2019 12:28 PM   Reuben Padilla is a 58 year old male who presents for:    Chief Complaint   Patient presents with     Oncology Clinic Visit     Return visit; Esophageal CA      Port Draw     Port labs collected by RN.      Initial Vitals: /81 (BP Location: Right arm, Patient Position: Sitting)   Pulse 72   Temp 97.8  F (36.6  C) (Oral)   Resp 20   Ht 1.981 m (6' 5.99\")   Wt 141.8 kg (312 lb 11.2 oz)   SpO2 97%   BMI 36.15 kg/m   Estimated body mass index is 36.15 kg/m  as calculated from the following:    Height as of this encounter: 1.981 m (6' 5.99\").    Weight as of this encounter: 141.8 kg (312 lb 11.2 oz). Body surface area is 2.79 meters squared.  Moderate Pain (4) Comment: Data Unavailable   No LMP for male patient.  Allergies reviewed: Yes  Medications reviewed: Yes    Medications: MEDICATION REFILLS NEEDED TODAY. Provider was notified. - Patient states that he needs a refill for gabapentin and Ativan.      Pharmacy name entered into [employer]:    [id number] [place] - [address] [place] [address] AT INTERSECTION [address] [place], [place] - [address] [place] [address] [phone]  [facility] #[id number] - [place], [place] - [address] [facility]    Clinical concerns: Patient has no new concerns. Patient states that he is having diarrhea.  Loraine Sutherland  was notified.    10 minutes for nursing intake (face to face time)     Suha Jiménez              "

## 2019-02-05 NOTE — PATIENT INSTRUCTIONS
Contact Numbers    [facility] Main Line: [phone]  [facility] Triage and after hours / weekends / holidays:  [phone]      Please call the triage or after hours line if you experience a temperature greater than or equal to 100.5, shaking chills, have uncontrolled nausea, vomiting and/or diarrhea, dizziness, shortness of breath, chest pain, bleeding, unexplained bruising, or if you have any other new/concerning symptoms, questions or concerns.      If you are having any concerning symptoms or wish to speak to a provider before your next infusion visit, please call your care coordinator or triage to notify them so we can adequately serve you.     If you need a refill on a narcotic prescription or other medication, please call before your infusion appointment.                 February 2019 Sunday Monday Tuesday Wednesday Thursday Friday Saturday                            1     2       3     4     5    [facility] LAB DRAW  11:45 AM   (15 min.)    [facility] LAB DRAW   [facility] Lab Draw    [facility] RETURN  12:15 PM   (50 min.)   Loraine Sutherland PA-C   [facility] ONC INFUSION 60   2:00 PM   (60 min.)    ONCOLOGY INFUSION   [facility] 6     7     8     9       10     11     12     13     14     15     16       17     18     19     20     21     22     23       24     25    [facility] DRAW   8:45 AM   (15 min.)    [facility]    [facility] RETURN   9:15 AM   (30 min.)   Kadi Dee MD   [facility] ONC INFUSION 60  10:00 AM   (60 min.)    ONCOLOGY INFUSION   [facility] 26 27 28 March 2019 Sunday Monday Tuesday Wednesday Thursday Friday Saturday                            1     2       3     4     5     6     7     8     9       10     11     12     13     14     15     16       17     18     19     20     21     22     23       24     25      26     27     28     29     30       31                                                  Recent Results (from the past 24 hour(s))   CBC with platelets differential    Collection Time: 02/05/19 12:21 PM   Result Value Ref Range    WBC 3.9 (L) 4.0 - 11.0 10e9/L    RBC Count 4.69 4.4 - 5.9 10e12/L    Hemoglobin 13.5 13.3 - 17.7 g/dL    Hematocrit 41.4 40.0 - 53.0 %    MCV 88 78 - 100 fl    MCH 28.8 26.5 - 33.0 pg    MCHC 32.6 31.5 - 36.5 g/dL    RDW 16.2 (H) 10.0 - 15.0 %    Platelet Count 154 150 - 450 10e9/L    Diff Method Automated Method     % Neutrophils 64.0 %    % Lymphocytes 28.1 %    % Monocytes 6.6 %    % Eosinophils 0.5 %    % Basophils 0.5 %    % Immature Granulocytes 0.3 %    Nucleated RBCs 0 0 /100    Absolute Neutrophil 2.5 1.6 - 8.3 10e9/L    Absolute Lymphocytes 1.1 0.8 - 5.3 10e9/L    Absolute Monocytes 0.3 0.0 - 1.3 10e9/L    Absolute Eosinophils 0.0 0.0 - 0.7 10e9/L    Absolute Basophils 0.0 0.0 - 0.2 10e9/L    Abs Immature Granulocytes 0.0 0 - 0.4 10e9/L    Absolute Nucleated RBC 0.0    Comprehensive metabolic panel    Collection Time: 02/05/19 12:21 PM   Result Value Ref Range    Sodium 143 133 - 144 mmol/L    Potassium 3.8 3.4 - 5.3 mmol/L    Chloride 116 (H) 94 - 109 mmol/L    Carbon Dioxide 19 (L) 20 - 32 mmol/L    Anion Gap 8 3 - 14 mmol/L    Glucose 95 70 - 99 mg/dL    Urea Nitrogen 14 7 - 30 mg/dL    Creatinine 0.92 0.66 - 1.25 mg/dL    GFR Estimate >90 >60 mL/min/[1.73_m2]    GFR Estimate If Black >90 >60 mL/min/[1.73_m2]    Calcium 8.5 8.5 - 10.1 mg/dL    Bilirubin Total 0.6 0.2 - 1.3 mg/dL    Albumin 3.6 3.4 - 5.0 g/dL    Protein Total 7.3 6.8 - 8.8 g/dL    Alkaline Phosphatase 80 40 - 150 U/L    ALT 36 0 - 70 U/L    AST 23 0 - 45 U/L   TSH with free T4 reflex    Collection Time: 02/05/19 12:21 PM   Result Value Ref Range    TSH 1.39 0.40 - 4.00 mU/L

## 2019-02-06 ENCOUNTER — TRANSFERRED RECORDS (OUTPATIENT)
Dept: HEALTH INFORMATION MANAGEMENT | Facility: CLINIC | Age: 59
End: 2019-02-06

## 2019-02-06 ENCOUNTER — CARE COORDINATION (OUTPATIENT)
Dept: ONCOLOGY | Facility: CLINIC | Age: 59
End: 2019-02-06

## 2019-02-06 LAB
Lab: NORMAL
Lab: NORMAL

## 2019-02-07 ENCOUNTER — TELEPHONE (OUTPATIENT)
Dept: ONCOLOGY | Facility: CLINIC | Age: 59
End: 2019-02-07

## 2019-02-07 NOTE — TELEPHONE ENCOUNTER
Pt called in to triage to ask about c-diff results, he dropped off specimen at [facility] yesterday. Scanned results show negative for c-diff. Pt report yesterday he took 5 Imodium and had between 5-6 loose stools, he thought maximum he could take was 4. This morning he already had 3 watery stools but has not taken any Imodium yet. Stated he used to take up to 8 Lomotil a day and that didn't work as well as Imodium. Stated he is drinking iced tea, water and staying on BRAT diet with a lot of bananas, does not eat spicy, greasy foods or caffeine. Wondering what else he can try, paged Dr. Dee for advice.    Per Dr. Dee: recommend maximizing Imodium #2 tabs 4 times daily and alternating with Lomotil 1 tab up to 8 tabs daily. Call back if symptoms do not improve or becomes lightheaded, dizzy, dehydrated may need to add steroids while on Keytruda. Information relayed to pt who verbalized understanding and will call us back on Monday to update on symptoms.

## 2019-02-25 ENCOUNTER — ONCOLOGY VISIT (OUTPATIENT)
Dept: ONCOLOGY | Facility: CLINIC | Age: 59
End: 2019-02-25
Attending: INTERNAL MEDICINE
Payer: COMMERCIAL

## 2019-02-25 ENCOUNTER — ANCILLARY PROCEDURE (OUTPATIENT)
Dept: CT IMAGING | Facility: CLINIC | Age: 59
End: 2019-02-25
Attending: PHYSICIAN ASSISTANT
Payer: COMMERCIAL

## 2019-02-25 ENCOUNTER — APPOINTMENT (OUTPATIENT)
Dept: LAB | Facility: CLINIC | Age: 59
End: 2019-02-25
Attending: INTERNAL MEDICINE
Payer: COMMERCIAL

## 2019-02-25 VITALS
HEART RATE: 92 BPM | HEIGHT: 78 IN | OXYGEN SATURATION: 98 % | DIASTOLIC BLOOD PRESSURE: 87 MMHG | WEIGHT: 309.8 LBS | RESPIRATION RATE: 18 BRPM | TEMPERATURE: 97.5 F | BODY MASS INDEX: 35.84 KG/M2 | SYSTOLIC BLOOD PRESSURE: 123 MMHG

## 2019-02-25 DIAGNOSIS — D70.1 CHEMOTHERAPY-INDUCED NEUTROPENIA (H): ICD-10-CM

## 2019-02-25 DIAGNOSIS — C15.5 CANCER OF DISTAL THIRD OF ESOPHAGUS (H): Primary | ICD-10-CM

## 2019-02-25 DIAGNOSIS — C15.5 CANCER OF DISTAL THIRD OF ESOPHAGUS (H): ICD-10-CM

## 2019-02-25 DIAGNOSIS — M19.90 INFLAMMATORY ARTHRITIS: Primary | ICD-10-CM

## 2019-02-25 DIAGNOSIS — I26.99 ACUTE PULMONARY EMBOLISM (H): Primary | ICD-10-CM

## 2019-02-25 DIAGNOSIS — T45.1X5A CHEMOTHERAPY-INDUCED NEUTROPENIA (H): ICD-10-CM

## 2019-02-25 DIAGNOSIS — I26.99 OTHER ACUTE PULMONARY EMBOLISM WITHOUT ACUTE COR PULMONALE (H): ICD-10-CM

## 2019-02-25 DIAGNOSIS — E66.01 MORBID OBESITY (H): ICD-10-CM

## 2019-02-25 LAB
ALBUMIN SERPL-MCNC: 3.6 G/DL (ref 3.4–5)
ALP SERPL-CCNC: 97 U/L (ref 40–150)
ALT SERPL W P-5'-P-CCNC: 55 U/L (ref 0–70)
ANION GAP SERPL CALCULATED.3IONS-SCNC: 6 MMOL/L (ref 3–14)
AST SERPL W P-5'-P-CCNC: 43 U/L (ref 0–45)
BASOPHILS # BLD AUTO: 0 10E9/L (ref 0–0.2)
BASOPHILS NFR BLD AUTO: 0.4 %
BILIRUB SERPL-MCNC: 0.5 MG/DL (ref 0.2–1.3)
BUN SERPL-MCNC: 11 MG/DL (ref 7–30)
CALCIUM SERPL-MCNC: 8.4 MG/DL (ref 8.5–10.1)
CHLORIDE SERPL-SCNC: 114 MMOL/L (ref 94–109)
CO2 SERPL-SCNC: 24 MMOL/L (ref 20–32)
CREAT SERPL-MCNC: 0.85 MG/DL (ref 0.66–1.25)
DIFFERENTIAL METHOD BLD: ABNORMAL
EOSINOPHIL # BLD AUTO: 0 10E9/L (ref 0–0.7)
EOSINOPHIL NFR BLD AUTO: 0.8 %
ERYTHROCYTE [DISTWIDTH] IN BLOOD BY AUTOMATED COUNT: 14.7 % (ref 10–15)
GFR SERPL CREATININE-BSD FRML MDRD: >90 ML/MIN/{1.73_M2}
GLUCOSE SERPL-MCNC: 100 MG/DL (ref 70–99)
HCT VFR BLD AUTO: 43.4 % (ref 40–53)
HGB BLD-MCNC: 13.6 G/DL (ref 13.3–17.7)
IMM GRANULOCYTES # BLD: 0 10E9/L (ref 0–0.4)
IMM GRANULOCYTES NFR BLD: 0.4 %
LYMPHOCYTES # BLD AUTO: 1.4 10E9/L (ref 0.8–5.3)
LYMPHOCYTES NFR BLD AUTO: 26.3 %
MCH RBC QN AUTO: 28.3 PG (ref 26.5–33)
MCHC RBC AUTO-ENTMCNC: 31.3 G/DL (ref 31.5–36.5)
MCV RBC AUTO: 90 FL (ref 78–100)
MONOCYTES # BLD AUTO: 0.3 10E9/L (ref 0–1.3)
MONOCYTES NFR BLD AUTO: 6.3 %
NEUTROPHILS # BLD AUTO: 3.4 10E9/L (ref 1.6–8.3)
NEUTROPHILS NFR BLD AUTO: 65.8 %
NRBC # BLD AUTO: 0 10*3/UL
NRBC BLD AUTO-RTO: 0 /100
PLATELET # BLD AUTO: 185 10E9/L (ref 150–450)
POTASSIUM SERPL-SCNC: 4 MMOL/L (ref 3.4–5.3)
PROT SERPL-MCNC: 7.4 G/DL (ref 6.8–8.8)
RBC # BLD AUTO: 4.8 10E12/L (ref 4.4–5.9)
SODIUM SERPL-SCNC: 144 MMOL/L (ref 133–144)
TSH SERPL DL<=0.005 MIU/L-ACNC: 2.08 MU/L (ref 0.4–4)
WBC # BLD AUTO: 5.2 10E9/L (ref 4–11)

## 2019-02-25 PROCEDURE — 25800030 ZZH RX IP 258 OP 636: Mod: ZF | Performed by: INTERNAL MEDICINE

## 2019-02-25 PROCEDURE — 99214 OFFICE O/P EST MOD 30 MIN: CPT | Mod: ZP | Performed by: INTERNAL MEDICINE

## 2019-02-25 PROCEDURE — 25000128 H RX IP 250 OP 636: Mod: ZF | Performed by: INTERNAL MEDICINE

## 2019-02-25 PROCEDURE — G0463 HOSPITAL OUTPT CLINIC VISIT: HCPCS | Mod: ZF

## 2019-02-25 PROCEDURE — 80053 COMPREHEN METABOLIC PANEL: CPT | Performed by: INTERNAL MEDICINE

## 2019-02-25 PROCEDURE — 96413 CHEMO IV INFUSION 1 HR: CPT

## 2019-02-25 PROCEDURE — 84443 ASSAY THYROID STIM HORMONE: CPT | Performed by: INTERNAL MEDICINE

## 2019-02-25 PROCEDURE — 85025 COMPLETE CBC W/AUTO DIFF WBC: CPT | Performed by: INTERNAL MEDICINE

## 2019-02-25 RX ORDER — MEPERIDINE HYDROCHLORIDE 25 MG/ML
25 INJECTION INTRAMUSCULAR; INTRAVENOUS; SUBCUTANEOUS EVERY 30 MIN PRN
Status: CANCELLED | OUTPATIENT
Start: 2019-02-25

## 2019-02-25 RX ORDER — HEPARIN SODIUM (PORCINE) LOCK FLUSH IV SOLN 100 UNIT/ML 100 UNIT/ML
5 SOLUTION INTRAVENOUS EVERY 8 HOURS PRN
Status: DISCONTINUED | OUTPATIENT
Start: 2019-02-25 | End: 2019-02-26 | Stop reason: HOSPADM

## 2019-02-25 RX ORDER — ALBUTEROL SULFATE 0.83 MG/ML
2.5 SOLUTION RESPIRATORY (INHALATION)
Status: CANCELLED | OUTPATIENT
Start: 2019-02-25

## 2019-02-25 RX ORDER — EPINEPHRINE 0.3 MG/.3ML
0.3 INJECTION SUBCUTANEOUS EVERY 5 MIN PRN
Status: CANCELLED | OUTPATIENT
Start: 2019-02-25

## 2019-02-25 RX ORDER — METHYLPREDNISOLONE SODIUM SUCCINATE 125 MG/2ML
125 INJECTION, POWDER, LYOPHILIZED, FOR SOLUTION INTRAMUSCULAR; INTRAVENOUS
Status: CANCELLED
Start: 2019-02-25

## 2019-02-25 RX ORDER — EPINEPHRINE 1 MG/ML
0.3 INJECTION, SOLUTION INTRAMUSCULAR; SUBCUTANEOUS EVERY 5 MIN PRN
Status: CANCELLED | OUTPATIENT
Start: 2019-02-25

## 2019-02-25 RX ORDER — ALBUTEROL SULFATE 90 UG/1
1-2 AEROSOL, METERED RESPIRATORY (INHALATION)
Status: CANCELLED
Start: 2019-02-25

## 2019-02-25 RX ORDER — PREDNISONE 20 MG/1
TABLET ORAL
Qty: 14 TABLET | Refills: 0 | Status: SHIPPED | OUTPATIENT
Start: 2019-02-25 | End: 2019-03-19

## 2019-02-25 RX ORDER — LORAZEPAM 2 MG/ML
0.5 INJECTION INTRAMUSCULAR EVERY 4 HOURS PRN
Status: CANCELLED
Start: 2019-02-25

## 2019-02-25 RX ORDER — HEPARIN SODIUM (PORCINE) LOCK FLUSH IV SOLN 100 UNIT/ML 100 UNIT/ML
500 SOLUTION INTRAVENOUS ONCE
Status: CANCELLED | OUTPATIENT
Start: 2019-02-25 | End: 2019-02-26

## 2019-02-25 RX ORDER — IOPAMIDOL 755 MG/ML
135 INJECTION, SOLUTION INTRAVASCULAR ONCE
Status: COMPLETED | OUTPATIENT
Start: 2019-02-25 | End: 2019-02-25

## 2019-02-25 RX ORDER — HEPARIN SODIUM (PORCINE) LOCK FLUSH IV SOLN 100 UNIT/ML 100 UNIT/ML
500 SOLUTION INTRAVENOUS ONCE
Status: COMPLETED | OUTPATIENT
Start: 2019-02-25 | End: 2019-02-25

## 2019-02-25 RX ORDER — SODIUM CHLORIDE 9 MG/ML
1000 INJECTION, SOLUTION INTRAVENOUS CONTINUOUS PRN
Status: CANCELLED
Start: 2019-02-25

## 2019-02-25 RX ORDER — DIPHENHYDRAMINE HYDROCHLORIDE 50 MG/ML
50 INJECTION INTRAMUSCULAR; INTRAVENOUS
Status: CANCELLED
Start: 2019-02-25

## 2019-02-25 RX ADMIN — SODIUM CHLORIDE 250 ML: 9 INJECTION, SOLUTION INTRAVENOUS at 10:25

## 2019-02-25 RX ADMIN — SODIUM CHLORIDE 200 MG: 900 INJECTION, SOLUTION INTRAVENOUS at 10:39

## 2019-02-25 RX ADMIN — HEPARIN 500 UNITS: 100 SYRINGE at 11:13

## 2019-02-25 RX ADMIN — HEPARIN 5 ML: 100 SYRINGE at 06:45

## 2019-02-25 RX ADMIN — IOPAMIDOL 135 ML: 755 INJECTION, SOLUTION INTRAVASCULAR at 07:06

## 2019-02-25 ASSESSMENT — PAIN SCALES - GENERAL: PAINLEVEL: MODERATE PAIN (5)

## 2019-02-25 ASSESSMENT — MIFFLIN-ST. JEOR: SCORE: 2358.33

## 2019-02-25 NOTE — PROGRESS NOTES
Infusion Nursing Note:  Reuben Padilla presents today for C7D1 Keytruda.    Patient seen by provider today: Yes: Uma Dee MD   present during visit today: Not Applicable.    Note: Patient feels well. No complaints made.  PS 4/10, refused any intervention at this time. Otherwise well.     Patient states that they waiting for Dr. Dee next plan regarding his Ct report. Refused to wait. Sent Inbasket to Minor Dee and Mansi Wetzel. Patient states that they will follow up in [facility] if unable to hear for them for next few days.     Intravenous Access:  Implanted Port.    Treatment Conditions:  Lab Results   Component Value Date    HGB 13.6 02/25/2019     Lab Results   Component Value Date    WBC 5.2 02/25/2019      Lab Results   Component Value Date    ANEU 3.4 02/25/2019     Lab Results   Component Value Date     02/25/2019      Lab Results   Component Value Date     02/25/2019                   Lab Results   Component Value Date    POTASSIUM 4.0 02/25/2019           Lab Results   Component Value Date    MAG 1.9 11/07/2018            Lab Results   Component Value Date    CR 0.85 02/25/2019                   Lab Results   Component Value Date    NIDHI 8.4 02/25/2019                Lab Results   Component Value Date    BILITOTAL 0.5 02/25/2019           Lab Results   Component Value Date    ALBUMIN 3.6 02/25/2019                    Lab Results   Component Value Date    ALT 55 02/25/2019           Lab Results   Component Value Date    AST 43 02/25/2019       Results reviewed, labs MET treatment parameters, ok to proceed with treatment.      Post Infusion Assessment:  Patient tolerated infusion without incident.  Blood return noted pre and post infusion.  No evidence of extravasations.  Access discontinued per protocol.    Discharge Plan:   Prescription refills given for Lovenox and Prednisone.  Discharge instructions reviewed with: Patient and Family.  Patient and/or family verbalized understanding  of discharge instructions and all questions answered.  AVS to patient via [employer].  Patient will return 3/19/19 for next appointment.   Patient discharged in stable condition accompanied by: self, wife and son.  Departure Mode: Ambulatory.    KAMI YOUNG RN

## 2019-02-25 NOTE — NURSING NOTE
"Oncology Rooming Note    February 25, 2019 7:58 AM   Reuben Padilla is a 58 year old male who presents for:    Chief Complaint   Patient presents with     Port Draw     Labs drawn via port by RN in lab. Line flushed and hep locked. VS taken.     Oncology Clinic Visit     Return visit; Esophageal CA      Initial Vitals: /87 (BP Location: Right arm, Patient Position: Sitting, Cuff Size: Adult Regular)   Pulse 92   Temp 97.5  F (36.4  C) (Oral)   Resp 18   Ht 1.981 m (6' 5.99\")   Wt 140.5 kg (309 lb 12.8 oz)   SpO2 98%   BMI 35.81 kg/m   Estimated body mass index is 35.81 kg/m  as calculated from the following:    Height as of this encounter: 1.981 m (6' 5.99\").    Weight as of this encounter: 140.5 kg (309 lb 12.8 oz). Body surface area is 2.78 meters squared.  Moderate Pain (5) Comment: Data Unavailable   No LMP for male patient.  Allergies reviewed: Yes  Medications reviewed: Yes    Medications: MEDICATION REFILLS NEEDED TODAY. Provider was notified. - Patient states that he needs a refill on his Lovenox 100 mg.     Pharmacy name entered into [employer]:    [id number] [place] - [address] [place] [address] AT INTERSECTION [address] [place], [place] - [address] [place] [address] [phone]  [id number] - [place], [place] - [address] [facility]    Clinical concerns: No new concerns today Dr. Dee was notified.      Suha Jiménez              "

## 2019-02-25 NOTE — NURSING NOTE
Chief Complaint   Patient presents with     Port Draw     Labs drawn via port by RN in lab. Line flushed and hep locked. VS taken.     Olga Ashley RN

## 2019-02-25 NOTE — PROGRESS NOTES
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 His tumor is positive for cytokeratin 20, negative for P63 and CK7, and HER2 is negative. He started on FOLFOX (5FU/oxaliplatin) on 5/15/2017. He had a delay in treatment from 9/5-10/2/17 due to work related injury.    He had an excellent response by imaging throughout the summer 2017.    He a mixed response by imaging in late fall 2017.    In January 2018, he was switched to taxol and cyramza - had slight progression and neuropathy and clinical trial became available.       In March 2018, he was started on the [facility] Clinical Trial with crizotinib.  (MET amplification) He remained on crizotinib from March - July 2018.    August 2018, he was switched back to taxol/cramza.  In October, he was switched to Keytruda (PD1 overexpressor).    INTERVAL HISTORY: Levi comes in today for followup. He is now on Keytruda.  He is here for restaging.     He has had no issues with fevers or chills, no chest pain or shortness of breath, no nausea, vomiting, diarrhea or constipation.  He does report arthralgias and myalgias.  These were particularly in his shoulders, his hands and in his knees but most significantly his hands and shoulders.  As a result, he has had a hard time gripping and lifting his arms.  He previously had a short course of steroids over 6 days with significant improvement in his symptoms.  The symptoms quickly developed.   There is no erythema or swelling over the joints.   This is significantly affecting his QOL.      He has no abdminal pain.  No n/v/d/c.       His energy levels are good.  Weight stable though overall is down since starting Keytruda.  He reports 2-3 loose stools per day.  Last night, he had many stools 4-6.  He is using immodium daily  "often up to 8 of them.    Energy is reasonable.    No dysphagia.        ROS: 10 point ROS neg other than the symptoms noted above in the HPI.    PHYSICAL EXAMINATION  /87 (BP Location: Right arm, Patient Position: Sitting, Cuff Size: Adult Regular)   Pulse 92   Temp 97.5  F (36.4  C) (Oral)   Resp 18   Ht 1.981 m (6' 5.99\")   Wt 140.5 kg (309 lb 12.8 oz)   SpO2 98%   BMI 35.81 kg/m     Wt Readings from Last 4 Encounters:   02/25/19 140.5 kg (309 lb 12.8 oz)   02/05/19 141.8 kg (312 lb 11.2 oz)   01/16/19 145.2 kg (320 lb 1.6 oz)   12/27/18 149.8 kg (330 lb 4.8 oz)     Constitutional: Alert, oriented male in no visible distress.  Eyes: PERRL. Anicteric sclerae.  ENT/Mouth: OM moist and pink without lesions or thrush.  CV: RRR  Resp: CTAB throughout  Abdomen: Soft, non-tender, non-distended. Obese. Bowel sounds present. Unable to palpate liver or spleen.  No RLQ tenderness.  Extremities: trace edema , no erythema or warmth over joints  Skin: Warm, dry.   Lymph: No cervical or supraclavicular lymphadenopathy appreciated.   Neuro: CN II-XII grossly intact.     MUSC: no erythema or warmth over shoulders, DIPs, PIPs.  Abduction to 90 degrees in shoulders and to 90 degrees with extension.  Able to very slowly  hands.  Strenght 3/5 in hands bilaterally.    ECOG PS: 1         Lab Results   Component Value Date    WBC 5.2 02/25/2019     Lab Results   Component Value Date    RBC 4.80 02/25/2019     Lab Results   Component Value Date    HGB 13.6 02/25/2019     Lab Results   Component Value Date    HCT 43.4 02/25/2019     No components found for: MCT  Lab Results   Component Value Date    MCV 90 02/25/2019     Lab Results   Component Value Date    MCH 28.3 02/25/2019     Lab Results   Component Value Date    MCHC 31.3 02/25/2019     Lab Results   Component Value Date    RDW 14.7 02/25/2019     Lab Results   Component Value Date     02/25/2019     Recent Labs   Lab Test 02/25/19  0651 02/05/19  1221   NA " "144 143   POTASSIUM 4.0 3.8   CHLORIDE 114* 116*   CO2 24 19*   ANIONGAP 6 8   * 95   BUN 11 14   CR 0.85 0.92   NIDHI 8.4* 8.5     Liver Function Studies -   Recent Labs   Lab Test 02/25/19  0651   PROTTOTAL 7.4   ALBUMIN 3.6   BILITOTAL 0.5   ALKPHOS 97   AST 43   ALT 55     REviewed his CT scan CAP - significant response to therapy, ? appendicitis      IMPRESSION/PLAN:  1. Metastatic esophogeal adenocarcinoma. He has had treatment with FOLFOX and then transitioned to Taxol with ramicurimab.  He was then on crizotinib on the [employer] Match study.  He progressed on this and returned on taxol/cyramza. And is now on Keytruda.    I am very pleased with how he is doing on Keytruda.  Will plan to repeat imaging in 12 weeks.        We discussed that he was screened for entrectinib and does not have identifiable mutations for this medication.    Other treatment options would include possible irinotecan or consideration of a phase 1 study ( FATE)     A history of PE.  He is on Lovenox twice daily.  He has progressed through Xarelto in the past.      He does have baseline neuropathy and is on gabapentin 600/300/600 mg daily.         ? Appendicitis - No symptoms of elevated WBC, fever or pain.  I put a call out to surgery.  My inclination is to watch this closely.  We discussed \"itis\" is common with immunotherapy though I cannot find any case reports of appendicitis.     He understands his therapies are not curative intent but rather palliative.  Additional supportive care measures are discussed.         Kadi Dee M.D.    Hematology and Oncology  [facility]  [phone]    Addendum:  Reviewed his imaging with several surgical colleagues.  Will monitor appendix.  Will have him return in 1 week to see surgery with repeat CT scan.  I updated Levi on this plan.  "

## 2019-02-25 NOTE — PATIENT INSTRUCTIONS
Contact Numbers  [facility]: [phone]    After Hours:  [phone]  Triage: [phone]    Please call the [facility] Triage line if you experience a temperature greater than or equal to 100.5, shaking chills, have uncontrolled nausea, vomiting and/or diarrhea, dizziness, shortness of breath, chest pain, bleeding, unexplained bruising, or if you have any other new/concerning symptoms, questions or concerns.     If it is after hours, weekends, or holidays, please call the main hospital  at  [phone] and ask to speak to the Oncology doctor on call.     If you are having any concerning symptoms or wish to speak to a provider before your next infusion visit, please call your care coordinator or triage to notify them so we can adequately serve you.     If you need a refill on a narcotic prescription or other medication, please call triage before your infusion appointment.         February 2019 Sunday Monday Tuesday Wednesday Thursday Friday Saturday                            1     2       3     4     5    [facility] DRAW  11:45 AM   (15 min.)   [facility]   [facility] Lab Draw    [facility] RETURN  12:15 PM   (50 min.)   Loraine Sutherland PA-C   [facility] ONC INFUSION 60   2:00 PM   (60 min.)    ONCOLOGY INFUSION   [facility] 6     7     8     9       10     11     12     13     14     15     16       17     18     19     20     21     22     23       24     25    CT CHEST/ABDOMEN/PELVIS W   7:00 AM   (20 min.)   UCCT1   [facility] CT    [facility]   8:45 AM   (15 min.)   [facility]   [facility] Lab Draw    [facility] RETURN   9:15 AM   (30 min.)   Kadi Dee MD   [facility] ONC INFUSION 60  10:00 AM   (60 min.)    ONCOLOGY INFUSION   [facility] 26 27 28 March 2019 Sunday Monday Tuesday Wednesday  Thursday Friday Saturday                            1     2       3     4     5     6     7     8     9       10     11     12     13     14     15     16       17     18     19    [facility] LAB DRAW  11:00 AM   (15 min.)   [facility] LAB DRAW   [facility] Lab Draw    [facility] RETURN  11:25 AM   (50 min.)   Loraine Sutherland PA-C   [facility] ONC INFUSION 60   1:00 PM   (60 min.)    ONCOLOGY INFUSION   [facility] 20     21     22     23       24     25     26     27     28     29     30       31                                                   Lab Results:  Recent Results (from the past 12 hour(s))   Comprehensive metabolic panel    Collection Time: 02/25/19  6:51 AM   Result Value Ref Range    Sodium 144 133 - 144 mmol/L    Potassium 4.0 3.4 - 5.3 mmol/L    Chloride 114 (H) 94 - 109 mmol/L    Carbon Dioxide 24 20 - 32 mmol/L    Anion Gap 6 3 - 14 mmol/L    Glucose 100 (H) 70 - 99 mg/dL    Urea Nitrogen 11 7 - 30 mg/dL    Creatinine 0.85 0.66 - 1.25 mg/dL    GFR Estimate >90 >60 mL/min/[1.73_m2]    GFR Estimate If Black >90 >60 mL/min/[1.73_m2]    Calcium 8.4 (L) 8.5 - 10.1 mg/dL    Bilirubin Total 0.5 0.2 - 1.3 mg/dL    Albumin 3.6 3.4 - 5.0 g/dL    Protein Total 7.4 6.8 - 8.8 g/dL    Alkaline Phosphatase 97 40 - 150 U/L    ALT 55 0 - 70 U/L    AST 43 0 - 45 U/L   TSH with free T4 reflex    Collection Time: 02/25/19  6:51 AM   Result Value Ref Range    TSH 2.08 0.40 - 4.00 mU/L   CBC with platelets differential    Collection Time: 02/25/19  6:51 AM   Result Value Ref Range    WBC 5.2 4.0 - 11.0 10e9/L    RBC Count 4.80 4.4 - 5.9 10e12/L    Hemoglobin 13.6 13.3 - 17.7 g/dL    Hematocrit 43.4 40.0 - 53.0 %    MCV 90 78 - 100 fl    MCH 28.3 26.5 - 33.0 pg    MCHC 31.3 (L) 31.5 - 36.5 g/dL    RDW 14.7 10.0 - 15.0 %    Platelet Count 185 150 - 450 10e9/L    Diff Method Automated Method     % Neutrophils 65.8 %    % Lymphocytes 26.3 %    % Monocytes 6.3 %    %  Eosinophils 0.8 %    % Basophils 0.4 %    % Immature Granulocytes 0.4 %    Nucleated RBCs 0 0 /100    Absolute Neutrophil 3.4 1.6 - 8.3 10e9/L    Absolute Lymphocytes 1.4 0.8 - 5.3 10e9/L    Absolute Monocytes 0.3 0.0 - 1.3 10e9/L    Absolute Eosinophils 0.0 0.0 - 0.7 10e9/L    Absolute Basophils 0.0 0.0 - 0.2 10e9/L    Abs Immature Granulocytes 0.0 0 - 0.4 10e9/L    Absolute Nucleated RBC 0.0

## 2019-02-25 NOTE — DISCHARGE INSTRUCTIONS

## 2019-02-25 NOTE — LETTER
2/25/2019       RE: Reuben Padilla  [address] [facility] [address]     Dear Colleague,    Thank you for referring your patient, Reuben Padilla, to the [facility]. Please see a copy of my visit note below.    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 His tumor is positive for cytokeratin 20, negative for P63 and CK7, and HER2 is negative. He started on FOLFOX (5FU/oxaliplatin) on 5/15/2017. He had a delay in treatment from 9/5-10/2/17 due to work related injury.    He had an excellent response by imaging throughout the summer 2017.    He a mixed response by imaging in late fall 2017.    In January 2018, he was switched to taxol and cyramza - had slight progression and neuropathy and clinical trial became available.       In March 2018, he was started on the [facility] Match Clinical Trial with crizotinib.  (MET amplification) He remained on crizotinib from March - July 2018.    August 2018, he was switched back to taxol/cramza.  In October, he was switched to Keytruda (PD1 overexpressor).    INTERVAL HISTORY: Levi comes in today for followup. He is now on Keytruda.  He is here for restaging.     He has had no issues with fevers or chills, no chest pain or shortness of breath, no nausea, vomiting, diarrhea or constipation.  He does report arthralgias and myalgias.  These were particularly in his shoulders, his hands and in his knees but most significantly his hands and shoulders.  As a result, he has had a hard time gripping and lifting his arms.  He previously had a short course of steroids over 6 days with significant improvement in his symptoms.  The symptoms quickly developed.   There is no erythema or swelling over the joints.   This is significantly affecting his QOL.     "  He has no abdminal pain.  No n/v/d/c.       His energy levels are good.  Weight stable though overall is down since starting Keytruda.  He reports 2-3 loose stools per day.  Last night, he had many stools 4-6.  He is using immodium daily often up to 8 of them.    Energy is reasonable.    No dysphagia.        ROS: 10 point ROS neg other than the symptoms noted above in the HPI.    PHYSICAL EXAMINATION  /87 (BP Location: Right arm, Patient Position: Sitting, Cuff Size: Adult Regular)   Pulse 92   Temp 97.5  F (36.4  C) (Oral)   Resp 18   Ht 1.981 m (6' 5.99\")   Wt 140.5 kg (309 lb 12.8 oz)   SpO2 98%   BMI 35.81 kg/m      Wt Readings from Last 4 Encounters:   02/25/19 140.5 kg (309 lb 12.8 oz)   02/05/19 141.8 kg (312 lb 11.2 oz)   01/16/19 145.2 kg (320 lb 1.6 oz)   12/27/18 149.8 kg (330 lb 4.8 oz)     Constitutional: Alert, oriented male in no visible distress.  Eyes: PERRL. Anicteric sclerae.  ENT/Mouth: OM moist and pink without lesions or thrush.  CV: RRR  Resp: CTAB throughout  Abdomen: Soft, non-tender, non-distended. Obese. Bowel sounds present. Unable to palpate liver or spleen.  No RLQ tenderness.  Extremities: trace edema , no erythema or warmth over joints  Skin: Warm, dry.   Lymph: No cervical or supraclavicular lymphadenopathy appreciated.   Neuro: CN II-XII grossly intact.     MUSC: no erythema or warmth over shoulders, DIPs, PIPs.  Abduction to 90 degrees in shoulders and to 90 degrees with extension.  Able to very slowly  hands.  Strenght 3/5 in hands bilaterally.    ECOG PS: 1         Lab Results   Component Value Date    WBC 5.2 02/25/2019     Lab Results   Component Value Date    RBC 4.80 02/25/2019     Lab Results   Component Value Date    HGB 13.6 02/25/2019     Lab Results   Component Value Date    HCT 43.4 02/25/2019     No components found for: MCT  Lab Results   Component Value Date    MCV 90 02/25/2019     Lab Results   Component Value Date    MCH 28.3 02/25/2019     Lab " "Results   Component Value Date    MCHC 31.3 02/25/2019     Lab Results   Component Value Date    RDW 14.7 02/25/2019     Lab Results   Component Value Date     02/25/2019     Recent Labs   Lab Test 02/25/19  0651 02/05/19  1221    143   POTASSIUM 4.0 3.8   CHLORIDE 114* 116*   CO2 24 19*   ANIONGAP 6 8   * 95   BUN 11 14   CR 0.85 0.92   NIDHI 8.4* 8.5     Liver Function Studies -   Recent Labs   Lab Test 02/25/19  0651   PROTTOTAL 7.4   ALBUMIN 3.6   BILITOTAL 0.5   ALKPHOS 97   AST 43   ALT 55     REviewed his CT scan CAP - significant response to therapy, ? appendicitis      IMPRESSION/PLAN:  1. Metastatic esophogeal adenocarcinoma. He has had treatment with FOLFOX and then transitioned to Taxol with ramicurimab.  He was then on crizotinib on the [employer] Match study.  He progressed on this and returned on taxol/cyramza. And is now on Keytruda.    I am very pleased with how he is doing on Keytruda.  Will plan to repeat imaging in 12 weeks.        We discussed that he was screened for entrectinib and does not have identifiable mutations for this medication.    Other treatment options would include possible irinotecan or consideration of a phase 1 study ([facility] FATE)     A history of PE.  He is on Lovenox twice daily.  He has progressed through Xarelto in the past.      He does have baseline neuropathy and is on gabapentin 600/300/600 mg daily.         ? Appendicitis - No symptoms of elevated WBC, fever or pain.  I put a call out to surgery.  My inclination is to watch this closely.  We discussed \"itis\" is common with immunotherapy though I cannot find any case reports of appendicitis.     He understands his therapies are not curative intent but rather palliative.  Additional supportive care measures are discussed.      Addendum:  Reviewed his imaging with several surgical colleagues.  Will monitor appendix.  Will have him return in 1 week to see surgery with repeat CT scan.  I updated Levi on this " plan.    Kadi Dee MD

## 2019-02-27 ENCOUNTER — TELEPHONE (OUTPATIENT)
Dept: ONCOLOGY | Facility: CLINIC | Age: 59
End: 2019-02-27

## 2019-02-27 DIAGNOSIS — C15.5 CANCER OF DISTAL THIRD OF ESOPHAGUS (H): Primary | ICD-10-CM

## 2019-02-27 NOTE — TELEPHONE ENCOUNTER
JOLIE to tell Levi that New Patient scheduling has held a spot for him at the [facility] on 3/7/2019 @ 9:30a with Dr. Lew. Once they are able to schedule him (they are having technical difficulty), they will call him to confirm.

## 2019-02-28 ENCOUNTER — HOSPITAL ENCOUNTER (OUTPATIENT)
Dept: CT IMAGING | Facility: CLINIC | Age: 59
Discharge: HOME | End: 2019-02-28
Attending: INTERNAL MEDICINE | Admitting: INTERNAL MEDICINE
Payer: COMMERCIAL

## 2019-02-28 DIAGNOSIS — C15.5 CANCER OF DISTAL THIRD OF ESOPHAGUS (H): ICD-10-CM

## 2019-02-28 PROCEDURE — 25000125 ZZHC RX 250: Performed by: RADIOLOGY

## 2019-02-28 PROCEDURE — 25000128 H RX IP 250 OP 636: Performed by: RADIOLOGY

## 2019-02-28 PROCEDURE — 74177 CT ABD & PELVIS W/CONTRAST: CPT

## 2019-02-28 RX ORDER — IOPAMIDOL 755 MG/ML
100 INJECTION, SOLUTION INTRAVASCULAR ONCE
Status: COMPLETED | OUTPATIENT
Start: 2019-02-28 | End: 2019-02-28

## 2019-02-28 RX ADMIN — IOPAMIDOL 100 ML: 755 INJECTION, SOLUTION INTRAVENOUS at 12:56

## 2019-02-28 RX ADMIN — SODIUM CHLORIDE 74 ML: 9 INJECTION, SOLUTION INTRAVENOUS at 12:56

## 2019-03-04 ENCOUNTER — TELEPHONE (OUTPATIENT)
Dept: RADIATION THERAPY | Facility: OUTPATIENT CENTER | Age: 59
End: 2019-03-04

## 2019-03-04 NOTE — TELEPHONE ENCOUNTER
ONCOLOGY INTAKE: Records Information      APPT INFORMATION: 3/7/19 at 9:30AM  Referring provider:  Dr. Kadi Dee  Ref[facility] s clinic:  [facility]  Reason for visit/diagnosis:  Cancer of Distal Third Esophagus    Were the records received with the referral (via [employer])? In [facility]    Has patient been seen for any external appt for this diagnosis (enter clinic/location)? No - per pt he has not been seen or treated for this outside of [facility].

## 2019-03-07 ENCOUNTER — OFFICE VISIT (OUTPATIENT)
Dept: RADIATION THERAPY | Facility: OUTPATIENT CENTER | Age: 59
End: 2019-03-07
Attending: INTERNAL MEDICINE
Payer: COMMERCIAL

## 2019-03-07 VITALS
DIASTOLIC BLOOD PRESSURE: 89 MMHG | SYSTOLIC BLOOD PRESSURE: 136 MMHG | HEART RATE: 68 BPM | RESPIRATION RATE: 16 BRPM | BODY MASS INDEX: 26.84 KG/M2 | WEIGHT: 232.2 LBS | OXYGEN SATURATION: 97 %

## 2019-03-07 DIAGNOSIS — C15.5 CANCER OF DISTAL THIRD OF ESOPHAGUS (H): Primary | ICD-10-CM

## 2019-03-07 NOTE — PROGRESS NOTES
"  Oncology Rooming Note    March 7, 2019 10:33 AM   Reuben Padilla is a 58 year old male who presents for:    Chief Complaint   Patient presents with     Oncology Clinic Visit     Metastatic esophogeal cancer, Dr Lew seeing pt. regarding appendix area     Initial Vitals: /89 (BP Location: Right arm, Patient Position: Chair, Cuff Size: Adult Large)   Pulse 68   Resp 16   Wt 105.3 kg (232 lb 3.2 oz)   SpO2 97%   BMI 26.84 kg/m   Estimated body mass index is 26.84 kg/m  as calculated from the following:    Height as of 2/25/19: 1.981 m (6' 5.99\").    Weight as of this encounter: 105.3 kg (232 lb 3.2 oz). Body surface area is 2.41 meters squared.  Data Unavailable Comment: Data Unavailable   No LMP for male patient.  Allergies reviewed: Yes  Medications reviewed: Yes    Medications: Medication refills not needed today.  Pharmacy name entered into [employer]:    [id number] - [place], [place] - [address] [place] [address] AT INTERSECTION [address] [place] [address] PHARMACY [place], [place] - [address] [place] [address] [phone]  [id number] - [place], [place] - [address] [place] [address]    Clinical concerns: Metastatic esophogeal cancer, Dr Lew seeing pt. regarding appendix area  Dr. Lew was notified.      Yanira Mandujano RN              "

## 2019-03-07 NOTE — PROGRESS NOTES
Levi Padilla is a 58-year-old man who I was asked to see at the request of Dr. Kadi Dee for evaluation of possible appendicitis.  The patient has been diagnosed with metastatic esophageal cancer for nearly 2 years.  He has been under various therapies during that time.  Presently he is on Keytruda.  He has unresectable local disease, liver metastasis and lymph node metastasis.  He had a routine surveillance CT scan on 02/25 which showed that he had an enlarged appendix with periappendiceal fluid, possible appendicitis.  He then underwent a followup CT scan 3 days later which demonstrated the appendix was abnormally dilated and had fatty infiltration and could represent appendicitis.  He is now here to talk about management options.  He has not had any abdominal pain, any new nausea or vomiting.  He does not document any fevers or chills.  He has been on prednisone, however.      PHYSICAL EXAMINATION:  He is a well-appearing man in no apparent distress.  His abdomen is soft, nondistended, nontender.  He has no palpable masses.  He has a healed gallbladder scar in his right upper quadrant.      IMPRESSION:  No evidence of acute appendicitis.      PLAN:  I talked to him about what symptoms to look for with acute appendicitis.  He will contact me if he has any trouble with abdominal pain.      TT: 20 minutes.  CT:  15 minutes.      cc:   Kadi Dee MD   Physicians   [address]  [address]

## 2019-03-07 NOTE — NURSING NOTE
"Oncology Rooming Note    March 7, 2019 12:55 PM   Reuben Padilla is a 58 year old male who presents for:    Chief Complaint   Patient presents with     Oncology Clinic Visit     Metastatic esophogeal cancer, Dr Lew seeing pt. regarding appendix area     Initial Vitals: /89 (BP Location: Right arm, Patient Position: Chair, Cuff Size: Adult Large)   Pulse 68   Resp 16   Wt 105.3 kg (232 lb 3.2 oz)   SpO2 97%   BMI 26.84 kg/m   Estimated body mass index is 26.84 kg/m  as calculated from the following:    Height as of 2/25/19: 1.981 m (6' 5.99\").    Weight as of this encounter: 105.3 kg (232 lb 3.2 oz). Body surface area is 2.41 meters squared.  Data Unavailable Comment: generalized   No LMP for male patient.  Allergies reviewed: Yes  Medications reviewed: Yes    Medications: Medication refills not needed today.  Pharmacy name entered into [employer]:    [id number] - [place], [place] - [address] [place] [address] AT INTERSECTION 109[facility] [place], [place] - [address] [place] [address] [phone]  [id number] - [place], [place] - [address] [facility]    Clinical concerns: Metastatic esophogeal cancer, Dr Lew seeing pt. regarding appendix area.  Dr. Lew was notified.      Yanira Mandujano RN            "

## 2019-03-07 NOTE — LETTER
3/7/2019    RE: Reuben Padilla  [address] [place] [id number]     Levi Padilla is a 58-year-old man who I was asked to see at the request of Dr. Kadi Dee for evaluation of possible appendicitis.  The patient has been diagnosed with metastatic esophageal cancer for nearly 2 years.  He has been under various therapies during that time.  Presently he is on Keytruda.  He has unresectable local disease, liver metastasis and lymph node metastasis.  He had a routine surveillance CT scan on 02/25 which showed that he had an enlarged appendix with periappendiceal fluid, possible appendicitis.  He then underwent a followup CT scan 3 days later which demonstrated the appendix was abnormally dilated and had fatty infiltration and could represent appendicitis.  He is now here to talk about management options.  He has not had any abdominal pain, any new nausea or vomiting.  He does not document any fevers or chills.  He has been on prednisone, however.      PHYSICAL EXAMINATION:  He is a well-appearing man in no apparent distress.  His abdomen is soft, nondistended, nontender.  He has no palpable masses.  He has a healed gallbladder scar in his right upper quadrant.      IMPRESSION:  No evidence of acute appendicitis.      PLAN:  I talked to him about what symptoms to look for with acute appendicitis.  He will contact me if he has any trouble with abdominal pain.      TT: 20 minutes.  CT:  15 minutes.      cc:   Kadi Dee MD   [address], [facility] [address]   [place]  [address]               Oncology Rooming Note    March 7, 2019 10:33 AM   Reuben Padilla is a 58 year old male who presents for:    Chief Complaint   Patient presents with     Oncology Clinic Visit     Metastatic esophogeal cancer, Dr Lew seeing pt. regarding appendix area     Initial Vitals: /89 (BP Location: Right arm, Patient Position: Chair, Cuff Size: Adult Large)   Pulse 68   Resp 16   Wt 105.3 kg (232 lb 3.2  "oz)   SpO2 97%   BMI 26.84 kg/m    Estimated body mass index is 26.84 kg/m  as calculated from the following:    Height as of 2/25/19: 1.981 m (6' 5.99\").    Weight as of this encounter: 105.3 kg (232 lb 3.2 oz). Body surface area is 2.41 meters squared.  Data Unavailable Comment: Data Unavailable   No LMP for male patient.  Allergies reviewed: Yes  Medications reviewed: Yes    Medications: Medication refills not needed today.  Pharmacy name entered into [employer]:    [id number] - [place], [place] - [address] [place] [address] AT INTERSECTION [address] [place] [address] - [address] [facility] [address]  [id number] [place], [place] - [address] [facility]    Clinical concerns: Metastatic esophogeal cancer, Dr Lew seeing pt. regarding appendix area  Dr. Lew was notified.  FRANCHESKA Troncoso MD      "

## 2019-03-11 ENCOUNTER — TELEPHONE (OUTPATIENT)
Dept: CARE COORDINATION | Facility: CLINIC | Age: 59
End: 2019-03-11

## 2019-03-11 NOTE — TELEPHONE ENCOUNTER
Per Patient's request,  completed and faxed [facility] request for lodging dates 3/18/19 - 3/20/19. [facility] will contact Patient for confirmation of reservation.  will continue to provide support as needed.    Flaquita Blackwell [facility]  Outpatient Specialty Clinics  Direct Phone: [phone]  Pager:  [phone]

## 2019-03-18 RX ORDER — ALBUTEROL SULFATE 0.83 MG/ML
2.5 SOLUTION RESPIRATORY (INHALATION)
Status: CANCELLED | OUTPATIENT
Start: 2019-03-19

## 2019-03-18 RX ORDER — ALBUTEROL SULFATE 90 UG/1
1-2 AEROSOL, METERED RESPIRATORY (INHALATION)
Status: CANCELLED
Start: 2019-03-19

## 2019-03-18 RX ORDER — SODIUM CHLORIDE 9 MG/ML
1000 INJECTION, SOLUTION INTRAVENOUS CONTINUOUS PRN
Status: CANCELLED
Start: 2019-03-19

## 2019-03-18 RX ORDER — MEPERIDINE HYDROCHLORIDE 25 MG/ML
25 INJECTION INTRAMUSCULAR; INTRAVENOUS; SUBCUTANEOUS EVERY 30 MIN PRN
Status: CANCELLED | OUTPATIENT
Start: 2019-03-19

## 2019-03-18 RX ORDER — EPINEPHRINE 1 MG/ML
0.3 INJECTION, SOLUTION INTRAMUSCULAR; SUBCUTANEOUS EVERY 5 MIN PRN
Status: CANCELLED | OUTPATIENT
Start: 2019-03-19

## 2019-03-18 RX ORDER — LORAZEPAM 2 MG/ML
0.5 INJECTION INTRAMUSCULAR EVERY 4 HOURS PRN
Status: CANCELLED
Start: 2019-03-19

## 2019-03-18 RX ORDER — METHYLPREDNISOLONE SODIUM SUCCINATE 125 MG/2ML
125 INJECTION, POWDER, LYOPHILIZED, FOR SOLUTION INTRAMUSCULAR; INTRAVENOUS
Status: CANCELLED
Start: 2019-03-19

## 2019-03-18 RX ORDER — HEPARIN SODIUM (PORCINE) LOCK FLUSH IV SOLN 100 UNIT/ML 100 UNIT/ML
500 SOLUTION INTRAVENOUS ONCE
Status: CANCELLED | OUTPATIENT
Start: 2019-03-19

## 2019-03-18 RX ORDER — DIPHENHYDRAMINE HYDROCHLORIDE 50 MG/ML
50 INJECTION INTRAMUSCULAR; INTRAVENOUS
Status: CANCELLED
Start: 2019-03-19

## 2019-03-18 RX ORDER — EPINEPHRINE 0.3 MG/.3ML
0.3 INJECTION SUBCUTANEOUS EVERY 5 MIN PRN
Status: CANCELLED | OUTPATIENT
Start: 2019-03-19

## 2019-03-19 ENCOUNTER — INFUSION THERAPY VISIT (OUTPATIENT)
Dept: ONCOLOGY | Facility: CLINIC | Age: 59
End: 2019-03-19
Attending: INTERNAL MEDICINE
Payer: COMMERCIAL

## 2019-03-19 ENCOUNTER — ONCOLOGY VISIT (OUTPATIENT)
Dept: ONCOLOGY | Facility: CLINIC | Age: 59
End: 2019-03-19
Attending: PHYSICIAN ASSISTANT
Payer: COMMERCIAL

## 2019-03-19 ENCOUNTER — OFFICE VISIT (OUTPATIENT)
Dept: ORTHOPEDICS | Facility: CLINIC | Age: 59
End: 2019-03-19
Payer: COMMERCIAL

## 2019-03-19 VITALS
SYSTOLIC BLOOD PRESSURE: 111 MMHG | OXYGEN SATURATION: 98 % | WEIGHT: 311.7 LBS | TEMPERATURE: 98.3 F | BODY MASS INDEX: 36.02 KG/M2 | HEART RATE: 91 BPM | RESPIRATION RATE: 18 BRPM | DIASTOLIC BLOOD PRESSURE: 78 MMHG

## 2019-03-19 VITALS — HEIGHT: 78 IN | WEIGHT: 305 LBS | BODY MASS INDEX: 35.29 KG/M2

## 2019-03-19 DIAGNOSIS — D70.1 CHEMOTHERAPY-INDUCED NEUTROPENIA (H): ICD-10-CM

## 2019-03-19 DIAGNOSIS — G89.29 CHRONIC RIGHT SHOULDER PAIN: Primary | ICD-10-CM

## 2019-03-19 DIAGNOSIS — T45.1X5A CHEMOTHERAPY-INDUCED NEUTROPENIA (H): ICD-10-CM

## 2019-03-19 DIAGNOSIS — M25.511 CHRONIC RIGHT SHOULDER PAIN: Primary | ICD-10-CM

## 2019-03-19 DIAGNOSIS — C15.5 CANCER OF DISTAL THIRD OF ESOPHAGUS (H): ICD-10-CM

## 2019-03-19 DIAGNOSIS — C15.5 CANCER OF DISTAL THIRD OF ESOPHAGUS (H): Primary | ICD-10-CM

## 2019-03-19 LAB
ALBUMIN SERPL-MCNC: 3.5 G/DL (ref 3.4–5)
ALP SERPL-CCNC: 78 U/L (ref 40–150)
ALT SERPL W P-5'-P-CCNC: 33 U/L (ref 0–70)
ANION GAP SERPL CALCULATED.3IONS-SCNC: 7 MMOL/L (ref 3–14)
AST SERPL W P-5'-P-CCNC: 19 U/L (ref 0–45)
BASOPHILS # BLD AUTO: 0 10E9/L (ref 0–0.2)
BASOPHILS NFR BLD AUTO: 0.4 %
BILIRUB SERPL-MCNC: 0.6 MG/DL (ref 0.2–1.3)
BUN SERPL-MCNC: 19 MG/DL (ref 7–30)
CALCIUM SERPL-MCNC: 9 MG/DL (ref 8.5–10.1)
CHLORIDE SERPL-SCNC: 111 MMOL/L (ref 94–109)
CO2 SERPL-SCNC: 23 MMOL/L (ref 20–32)
CREAT SERPL-MCNC: 0.73 MG/DL (ref 0.66–1.25)
DIFFERENTIAL METHOD BLD: ABNORMAL
EOSINOPHIL # BLD AUTO: 0 10E9/L (ref 0–0.7)
EOSINOPHIL NFR BLD AUTO: 0.8 %
ERYTHROCYTE [DISTWIDTH] IN BLOOD BY AUTOMATED COUNT: 14.7 % (ref 10–15)
GFR SERPL CREATININE-BSD FRML MDRD: >90 ML/MIN/{1.73_M2}
GLUCOSE SERPL-MCNC: 103 MG/DL (ref 70–99)
HCT VFR BLD AUTO: 41.2 % (ref 40–53)
HGB BLD-MCNC: 13.7 G/DL (ref 13.3–17.7)
IMM GRANULOCYTES # BLD: 0 10E9/L (ref 0–0.4)
IMM GRANULOCYTES NFR BLD: 0.6 %
LYMPHOCYTES # BLD AUTO: 1.1 10E9/L (ref 0.8–5.3)
LYMPHOCYTES NFR BLD AUTO: 20.8 %
MCH RBC QN AUTO: 30.2 PG (ref 26.5–33)
MCHC RBC AUTO-ENTMCNC: 33.3 G/DL (ref 31.5–36.5)
MCV RBC AUTO: 91 FL (ref 78–100)
MONOCYTES # BLD AUTO: 0.4 10E9/L (ref 0–1.3)
MONOCYTES NFR BLD AUTO: 6.8 %
NEUTROPHILS # BLD AUTO: 3.6 10E9/L (ref 1.6–8.3)
NEUTROPHILS NFR BLD AUTO: 70.6 %
NRBC # BLD AUTO: 0 10*3/UL
NRBC BLD AUTO-RTO: 0 /100
PLATELET # BLD AUTO: 126 10E9/L (ref 150–450)
POTASSIUM SERPL-SCNC: 4 MMOL/L (ref 3.4–5.3)
PROT SERPL-MCNC: 7.1 G/DL (ref 6.8–8.8)
RBC # BLD AUTO: 4.54 10E12/L (ref 4.4–5.9)
SODIUM SERPL-SCNC: 141 MMOL/L (ref 133–144)
TSH SERPL DL<=0.005 MIU/L-ACNC: 1.23 MU/L (ref 0.4–4)
WBC # BLD AUTO: 5.2 10E9/L (ref 4–11)

## 2019-03-19 PROCEDURE — 99214 OFFICE O/P EST MOD 30 MIN: CPT | Mod: ZP | Performed by: PHYSICIAN ASSISTANT

## 2019-03-19 PROCEDURE — 85025 COMPLETE CBC W/AUTO DIFF WBC: CPT | Performed by: INTERNAL MEDICINE

## 2019-03-19 PROCEDURE — 96413 CHEMO IV INFUSION 1 HR: CPT

## 2019-03-19 PROCEDURE — 25000128 H RX IP 250 OP 636: Mod: ZF | Performed by: PHYSICIAN ASSISTANT

## 2019-03-19 PROCEDURE — 80053 COMPREHEN METABOLIC PANEL: CPT | Performed by: PHYSICIAN ASSISTANT

## 2019-03-19 PROCEDURE — 25800030 ZZH RX IP 258 OP 636: Mod: ZF | Performed by: PHYSICIAN ASSISTANT

## 2019-03-19 PROCEDURE — G0463 HOSPITAL OUTPT CLINIC VISIT: HCPCS | Mod: ZF

## 2019-03-19 PROCEDURE — 84443 ASSAY THYROID STIM HORMONE: CPT | Performed by: PHYSICIAN ASSISTANT

## 2019-03-19 RX ORDER — HEPARIN SODIUM (PORCINE) LOCK FLUSH IV SOLN 100 UNIT/ML 100 UNIT/ML
5 SOLUTION INTRAVENOUS EVERY 8 HOURS
Status: DISCONTINUED | OUTPATIENT
Start: 2019-03-19 | End: 2019-03-20 | Stop reason: HOSPADM

## 2019-03-19 RX ORDER — OXYCODONE HYDROCHLORIDE 10 MG/1
10 TABLET ORAL EVERY 4 HOURS PRN
Qty: 60 TABLET | Refills: 0 | Status: SHIPPED | OUTPATIENT
Start: 2019-03-19 | End: 2019-05-20

## 2019-03-19 RX ORDER — HEPARIN SODIUM (PORCINE) LOCK FLUSH IV SOLN 100 UNIT/ML 100 UNIT/ML
500 SOLUTION INTRAVENOUS ONCE
Status: COMPLETED | OUTPATIENT
Start: 2019-03-19 | End: 2019-03-19

## 2019-03-19 RX ORDER — LIDOCAINE HYDROCHLORIDE 10 MG/ML
4 INJECTION, SOLUTION EPIDURAL; INFILTRATION; INTRACAUDAL; PERINEURAL
Status: DISCONTINUED | OUTPATIENT
Start: 2019-03-19 | End: 2019-09-03

## 2019-03-19 RX ORDER — TRIAMCINOLONE ACETONIDE 40 MG/ML
40 INJECTION, SUSPENSION INTRA-ARTICULAR; INTRAMUSCULAR
Status: DISCONTINUED | OUTPATIENT
Start: 2019-03-19 | End: 2019-09-03

## 2019-03-19 RX ADMIN — SODIUM CHLORIDE 250 ML: 9 INJECTION, SOLUTION INTRAVENOUS at 13:27

## 2019-03-19 RX ADMIN — HEPARIN SODIUM (PORCINE) LOCK FLUSH IV SOLN 100 UNIT/ML 500 UNITS: 100 SOLUTION at 14:04

## 2019-03-19 RX ADMIN — LIDOCAINE HYDROCHLORIDE 4 ML: 10 INJECTION, SOLUTION EPIDURAL; INFILTRATION; INTRACAUDAL; PERINEURAL at 08:55

## 2019-03-19 RX ADMIN — HEPARIN SODIUM (PORCINE) LOCK FLUSH IV SOLN 100 UNIT/ML 5 ML: 100 SOLUTION at 10:42

## 2019-03-19 RX ADMIN — SODIUM CHLORIDE 200 MG: 9 INJECTION, SOLUTION INTRAVENOUS at 13:27

## 2019-03-19 RX ADMIN — TRIAMCINOLONE ACETONIDE 40 MG: 40 INJECTION, SUSPENSION INTRA-ARTICULAR; INTRAMUSCULAR at 08:55

## 2019-03-19 ASSESSMENT — PAIN SCALES - GENERAL: PAINLEVEL: MODERATE PAIN (5)

## 2019-03-19 ASSESSMENT — MIFFLIN-ST. JEOR: SCORE: 2336.72

## 2019-03-19 NOTE — PROGRESS NOTES
Infusion Nursing Note:  Reuben Padilla presents for D1C8 Keytruda  Met with SERGIO Barker before infusion.    Note: N/A.    Pain: pt picking up new oxy script today for his shoulder pain, no additional interventions requested in infusion today    Treatment Conditions:  Lab Results   Component Value Date    HGB 13.7 03/19/2019     Lab Results   Component Value Date    WBC 5.2 03/19/2019      Lab Results   Component Value Date    ANEU 3.6 03/19/2019     Lab Results   Component Value Date     03/19/2019      Lab Results   Component Value Date     03/19/2019                   Lab Results   Component Value Date    POTASSIUM 4.0 03/19/2019           Lab Results   Component Value Date    MAG 1.9 11/07/2018            Lab Results   Component Value Date    CR 0.73 03/19/2019                   Lab Results   Component Value Date    NIDHI 9.0 03/19/2019                Lab Results   Component Value Date    BILITOTAL 0.6 03/19/2019           Lab Results   Component Value Date    ALBUMIN 3.5 03/19/2019                    Lab Results   Component Value Date    ALT 33 03/19/2019           Lab Results   Component Value Date    AST 19 03/19/2019       Results reviewed, labs MET treatment parameters, ok to proceed with treatment.    Intravenous Access:  Implanted Port.    Post Infusion Assessment:  Patient tolerated infusion without incident.  Blood return noted pre and post infusion.  No evidence of extravasations.  Access discontinued per protocol.    Discharge Plan:   Prescription refills given for oxycodone.  Discharge instructions reviewed with: Patient.  Patient and/or family verbalized understanding of discharge instructions and all questions answered.  AVS to patient via [employer].  Patient will return 4/8 for next appointment.   Patient discharged in stable condition accompanied by: self.    Lily Mahoney RN

## 2019-03-19 NOTE — PROGRESS NOTES
"      SPORTS & ORTHOPEDIC WALK-IN FOLLOW-UP VISIT 3/19/2019    Interval History:     Follow up reason: right shoulder pain    Date of injury: NA    Date last seen: 12/12/2017    Following Therapeutic Plan: Yes     Pain: Unchanged    Function: Unchanged    Interval History: weather, will take \"Oxy\" to help him sleep \"every now and then\"     Medical History:    Any recent changes to your medical history? No    Any new medication prescribed since last visit? No    Review of Systems:    Do you have fever, chills, weight loss? No    Do you have any vision problems? No    Do you have any chest pain or edema? No    Do you have any shortness of breath or wheezing?  No    Do you have stomach problems? No    Do you have any numbness or focal weakness? No    Do you have diabetes? No    Do you have problems with bleeding or clotting? No    Do you have an rashes or other skin lesions? No         Large Joint Injection/Arthocentesis: R subacromial bursa  Date/Time: 3/19/2019 8:55 AM  Performed by: Willie Wood DO  Authorized by: Willie Wood DO     Indications:  Pain  Needle Size:  25 G  Guidance: landmark guided    Approach:  Anterolateral  Location:  Shoulder  Site:  R subacromial bursa  Medications:  40 mg triamcinolone 40 MG/ML; 4 mL lidocaine (PF) 1 %  Consent Given by:  Patient        "

## 2019-03-19 NOTE — NURSING NOTE
Chief Complaint   Patient presents with     Port Draw     Labs drawn via PORT by RN in lab. Line flushed with saline and heparin. VS taken.      Ren Kline RN

## 2019-03-19 NOTE — NURSING NOTE
[facility] SPORTS AND ORTHOPAEDIC WALK IN CLINIC  [address] [address]  [phone]  Dept: [phone]  ______________________________________________________________________________    Patient: Reuben Padilla   : 1960   MRN: [id number]   2019    INVASIVE PROCEDURE SAFETY CHECKLIST    Date: 3/19/2019   Procedure:right subacromial injection  Patient Name: Reuben Padilla  MRN: [id number]  YOB: 1960    Action: Complete sections as appropriate. Any discrepancy results in a HARD COPY until resolved.     PRE PROCEDURE:  Patient ID verified with 2 identifiers (name and  or MRN): Yes  Procedure and site verified with patient/designee (when able): Yes  Accurate consent documentation in medical record: Yes  H&P (or appropriate assessment) documented in medical record: Yes  H&P must be up to 20 days prior to procedure and updates within 24 hours of procedure as applicable: NA  Relevant diagnostic and radiology test results appropriately labeled and displayed as applicable: Yes  Procedure site(s) marked with provider initials: NA    TIMEOUT:  Time-Out performed immediately prior to starting procedure, including verbal and active participation of all team members addressing the following:Yes  * Correct patient identify  * Confirmed that the correct side and site are marked  * An accurate procedure consent form  * Agreement on the procedure to be done  * Correct patient position  * Relevant images and results are properly labeled and appropriately displayed  * The need to administer antibiotics or fluids for irrigation purposes during the procedure as applicable   * Safety precautions based on patient history or medication use    DURING PROCEDURE: Verification of correct person, site, and procedures any time the responsibility for care of the patient is transferred to another member of the care team.

## 2019-03-19 NOTE — PROGRESS NOTES
CHIEF COMPLAINT:  Follow Up of the Right Shoulder       HISTORY OF PRESENT ILLNESS  Mr. Padilla is a pleasant 58 year old year old male with past medical history of metastatic esophageal cancer currently on Keytruda presenting with acute on chronic right shoulder pain.  Patient was previously seen in my office in December 2017 and diagnosed with an acute full-thickness partial supraspinatus tear suffered after a heavy machinery accident.  Due to his ongoing cancer treatment, patient and I discussed alternative measures then surgical intervention to repair cuff.  He overall had some good relief for about a year until 3-4 months ago.  Patient states that he now has pain at the lateral anterior aspect of the right shoulder, worse with movement.  He has pain at night and is unable to lay on that side.  He remains restricted in his ability to reach overhead, but this is more long-standing.    Patient has been treating his condition with analgesics, oxycodone at times for sleep and activity modifications.  Patient is looking for additional treatment to help his current level of pain.     Additional history: as documented    MEDICAL HISTORY  Patient Active Problem List   Diagnosis     Cancer of distal third of esophagus (H)     Acute pulmonary embolism (H)     Morbid obesity (H)     Tear of right supraspinatus tendon     Examination of participant in clinical trial     Esophageal obstruction     Paroxysmal A-fib (H)     Orthostatic hypotension     Chemotherapy-induced neutropenia (H)     Malnutrition (H)     G tube feedings (H)       Current Outpatient Medications   Medication Sig Dispense Refill     acetaminophen (TYLENOL) 325 MG tablet Take 3 tablets (975 mg) by mouth every 8 hours as needed for mild pain 100 tablet      diphenoxylate-atropine (LOMOTIL) 2.5-0.025 MG tablet Take 1-2 tablets by mouth 4 times daily as needed for diarrhea (Max of 8 pills in 24 hours) 100 tablet 1     enoxaparin (LOVENOX) 100 MG/ML syringe  "Inject 1 mL (100 mg) Subcutaneous every 12 hours 60 Syringe 3     enoxaparin (LOVENOX) 150 MG/ML injection INJECT 0.86ML (130MG) TWICE A DAY FOR ACUTE PULMONARY EMBOLISM AND ESOPHAGEAL MALIGNANCY  3     gabapentin (NEURONTIN) 300 MG capsule 2 tablets in the am and 3 tablets before bed 150 capsule 5     latanoprost (XALATAN) 0.005 % ophthalmic solution Place 1 drop into both eyes At Bedtime 1 Bottle 0     lidocaine-prilocaine (EMLA) cream Apply topically as needed for moderate pain 30 g 3     LORazepam (ATIVAN) 0.5 MG tablet Take 1 tablet (0.5 mg) by mouth every 4 hours as needed (Anxiety, Nausea/Vomiting or Sleep) 30 tablet 2     metoclopramide (REGLAN) 10 MG tablet Take 1 tablet (10 mg) by mouth 3 times daily (Patient not taking: Reported on 3/7/2019) 120 tablet 3     multivitamin, therapeutic with minerals (MULTI-VITAMIN) TABS tablet Take 1 tablet by mouth daily       predniSONE (DELTASONE) 20 MG tablet Take one tablet daily for one week and then one-half tablet for one week.. 14 tablet 0     timolol (TIMOPTIC) 0.5 % ophthalmic solution Place into both eyes daily       zolpidem (AMBIEN) 10 MG tablet Take 1 tablet (10 mg) by mouth nightly as needed 60 tablet 1       Allergies   Allergen Reactions     Penicillin G Other (See Comments)     Pt not sure-     Penicillins Unknown       History reviewed. No pertinent family history.    Additional medical/Social/Surgical histories reviewed in [facility] and updated as appropriate.     REVIEW OF SYSTEMS (3/19/2019)  A 10-point review of systems was obtained and is negative except for as noted in the HPI.        PHYSICAL EXAM  Ht 1.981 m (6' 6\")   Wt 138.3 kg (305 lb)   BMI 35.25 kg/m      General  - normal appearance, in no obvious distress  CV  - normal radial pulse  Pulm  - normal respiratory pattern, non-labored  Musculoskeletal - Right shoulder  - inspection: normal bone and joint alignment, no obvious deformity, no scapular winging, no AC step-off  - palpation: mildly " tender RC insertion, normal clavicle, non-tender AC  - ROM:  painful and limited forward flexion and ER at end range, limited IR to lower lumbar region  - strength: Cuff weakness in abduction, external rotation, internal rotation and painful   - special tests:  (-) Speed's  (+) Neer  (+) Hawkin's  (+) Gordy's  (-) Baca's  (-) apprehension  (-) subscap lift-off  Neuro  - no sensory or motor deficit, grossly normal coordination, normal muscle tone  Skin  - no ecchymosis, erythema, warmth, or induration, no obvious rash  Psych  - interactive, appropriate, normal mood and affect     ASSESSMENT & PLAN  Mr. Padilla is a 58 year old year old male currently on Keytruda for metastatic esophageal cancer who presents to clinic today with acute on chronic right shoulder pain and known full thickness rotator cuff tear.  At this point we discussed consideration for subacromial bursal injection to augment pain relief.    Diagnosis: Tear of right rotator cuff.  Acute shoulder pain.    -Corticosteroid injection today  -Ice today and PRN  -Analgesics discussed, oxycodone per oncology  -Follow up 3 months, may repeat injection    Subacromial Bursa - Ultrasound Guided  The patient was informed of the risks and the benefits of the procedure and a written consent was signed.  The patient s shoulder was prepped with chlorhexidine in sterile fashion.   40 mg of triamcinolone suspension was drawn up into a 5 mL syringe with 4 mL of 1% lidocaine.  Injection was performed using sterile technique.  Under ultrasound guidance a 1.5-inch 25-gauge needle was used to enter the subacromial bursa.  Anterolateral approach was used with arm held in Crass position.  Needle placement was visualized and documented with ultrasound.  Ultrasound visualization was necessary to ensure placement in to the bursa and not the rotator cuff tendon which could potentially cause further tendon damage.  Injection performed long axis to the probe.  Injection solution  visualized within the joint space.  Images were permanently stored for the patient's record.  There were no complications. The patient tolerated the procedure well. There was negligible bleeding.   The patient was instructed to ice the shoulder upon leaving clinic and refrain from overuse over the next 3 days.   The patient was instructed to call or go to the emergency room with any unusual pain, swelling, redness, or if otherwise concerned.  A follow up appointment will be scheduled to evaluate response to the injection, and to assess range of motion and pain  It was a pleasure seeing Reuben today.    Willie Wood DO, CAM  Primary Care Sports Medicine

## 2019-03-19 NOTE — PATIENT INSTRUCTIONS
Contact numbers:  Triage Main/After hours Line: [phone]    Main (scheduling) line: [phone]    Call with chills and/or temperature greater than or equal to 100.5 and questions or concerns.    If after hours, weekends, or holidays, call main hospital  at  [phone] and ask for Oncology doctor on call.         March 2019 Sunday Monday Tuesday Wednesday Thursday Friday Saturday                            1     2       3     4     5     6     7    [facility] NEW   9:15 AM   (45 min.)   Basil Lew MD   [facility] 8     9       10     11     12     13     14     15     16       17     18     19    [facility] RETURN WALK IN ORTHO        9:00 AM   (10 min.)   Willie Wood DO [facility] Orthopa[facility]    [facility] DRAW  11:00 AM   (15 min.)    [facility]    [facility] RETURN  11:25 AM   (50 min.)   Loraine Sutherland PA-C [facility] ONC INFUSION 60   1:00 PM   (60 min.)   [facility]ION   [facility] 20     21     22     23       24     25     26     27     28     29     30       31                                               April 2019 Sunday Monday Tuesday Wednesday Thursday Friday Saturday        1     2     3     4     5     6       7     8    [facility] DRAW  11:00 AM   (15 min.)    [facility] DRAW   [facility] Draw    [facility] RETURN  11:35 AM   (50 min.)   Loraine Sutherland PA-C [facility] ONC INFUSION 60   1:30 PM   (60 min.)   [facility] INFUSION   [facility] 9     10     11     12     13       14     15     16     17     18     19     20       21     22     23     24     25     26     27       28     29    [facility] DRAW  12:00 PM   (15 min.)    [facility] DRAW   [facility] Draw    [facility] RETURN  12:25 PM   (50 min.)   Loraine Sutherland PA-C [facility]  ONC INFUSION 60   1:30 PM   (60 min.)    ONCOLOGY INFUSION   [facility] 30                                         Lab Results:  Recent Results (from the past 12 hour(s))   Comprehensive metabolic panel    Collection Time: 03/19/19 10:46 AM   Result Value Ref Range    Sodium 141 133 - 144 mmol/L    Potassium 4.0 3.4 - 5.3 mmol/L    Chloride 111 (H) 94 - 109 mmol/L    Carbon Dioxide 23 20 - 32 mmol/L    Anion Gap 7 3 - 14 mmol/L    Glucose 103 (H) 70 - 99 mg/dL    Urea Nitrogen 19 7 - 30 mg/dL    Creatinine 0.73 0.66 - 1.25 mg/dL    GFR Estimate >90 >60 mL/min/[1.73_m2]    GFR Estimate If Black >90 >60 mL/min/[1.73_m2]    Calcium 9.0 8.5 - 10.1 mg/dL    Bilirubin Total 0.6 0.2 - 1.3 mg/dL    Albumin 3.5 3.4 - 5.0 g/dL    Protein Total 7.1 6.8 - 8.8 g/dL    Alkaline Phosphatase 78 40 - 150 U/L    ALT 33 0 - 70 U/L    AST 19 0 - 45 U/L   TSH with free T4 reflex    Collection Time: 03/19/19 10:46 AM   Result Value Ref Range    TSH 1.23 0.40 - 4.00 mU/L   CBC with platelets differential    Collection Time: 03/19/19 10:46 AM   Result Value Ref Range    WBC 5.2 4.0 - 11.0 10e9/L    RBC Count 4.54 4.4 - 5.9 10e12/L    Hemoglobin 13.7 13.3 - 17.7 g/dL    Hematocrit 41.2 40.0 - 53.0 %    MCV 91 78 - 100 fl    MCH 30.2 26.5 - 33.0 pg    MCHC 33.3 31.5 - 36.5 g/dL    RDW 14.7 10.0 - 15.0 %    Platelet Count 126 (L) 150 - 450 10e9/L    Diff Method Automated Method     % Neutrophils 70.6 %    % Lymphocytes 20.8 %    % Monocytes 6.8 %    % Eosinophils 0.8 %    % Basophils 0.4 %    % Immature Granulocytes 0.6 %    Nucleated RBCs 0 0 /100    Absolute Neutrophil 3.6 1.6 - 8.3 10e9/L    Absolute Lymphocytes 1.1 0.8 - 5.3 10e9/L    Absolute Monocytes 0.4 0.0 - 1.3 10e9/L    Absolute Eosinophils 0.0 0.0 - 0.7 10e9/L    Absolute Basophils 0.0 0.0 - 0.2 10e9/L    Abs Immature Granulocytes 0.0 0 - 0.4 10e9/L    Absolute Nucleated RBC 0.0

## 2019-03-19 NOTE — LETTER
RE: Reuben Padilla  [address] [place] [id number]     Dear Colleague,    Thank you for referring your patient, Reuben Padilla, to the [facility] SPORTS AND ORTHOPAEDIC WALK IN CLINIC at [facility]. Please see a copy of my visit note below.    CHIEF COMPLAINT:  Follow Up of the Right Shoulder       HISTORY OF PRESENT ILLNESS  Mr. Padilla is a pleasant 58 year old year old male with past medical history of metastatic esophageal cancer currently on Keytruda presenting with acute on chronic right shoulder pain.  Patient was previously seen in my office in December 2017 and diagnosed with an acute full-thickness partial supraspinatus tear suffered after a heavy machinery accident.  Due to his ongoing cancer treatment, patient and I discussed alternative measures then surgical intervention to repair cuff.  He overall had some good relief for about a year until 3-4 months ago.  Patient states that he now has pain at the lateral anterior aspect of the right shoulder, worse with movement.  He has pain at night and is unable to lay on that side.  He remains restricted in his ability to reach overhead, but this is more long-standing.    Patient has been treating his condition with analgesics, oxycodone at times for sleep and activity modifications.  Patient is looking for additional treatment to help his current level of pain.     Additional history: as documented    MEDICAL HISTORY  Patient Active Problem List   Diagnosis     Cancer of distal third of esophagus (H)     Acute pulmonary embolism (H)     Morbid obesity (H)     Tear of right supraspinatus tendon     Examination of participant in clinical trial     Esophageal obstruction     Paroxysmal A-fib (H)     Orthostatic hypotension     Chemotherapy-induced neutropenia (H)     Malnutrition (H)     G tube feedings (H)       Current Outpatient Medications   Medication Sig Dispense Refill     acetaminophen (TYLENOL) 325 MG  "tablet Take 3 tablets (975 mg) by mouth every 8 hours as needed for mild pain 100 tablet      diphenoxylate-atropine (LOMOTIL) 2.5-0.025 MG tablet Take 1-2 tablets by mouth 4 times daily as needed for diarrhea (Max of 8 pills in 24 hours) 100 tablet 1     enoxaparin (LOVENOX) 100 MG/ML syringe Inject 1 mL (100 mg) Subcutaneous every 12 hours 60 Syringe 3     enoxaparin (LOVENOX) 150 MG/ML injection INJECT 0.86ML (130MG) TWICE A DAY FOR ACUTE PULMONARY EMBOLISM AND ESOPHAGEAL MALIGNANCY  3     gabapentin (NEURONTIN) 300 MG capsule 2 tablets in the am and 3 tablets before bed 150 capsule 5     latanoprost (XALATAN) 0.005 % ophthalmic solution Place 1 drop into both eyes At Bedtime 1 Bottle 0     lidocaine-prilocaine (EMLA) cream Apply topically as needed for moderate pain 30 g 3     LORazepam (ATIVAN) 0.5 MG tablet Take 1 tablet (0.5 mg) by mouth every 4 hours as needed (Anxiety, Nausea/Vomiting or Sleep) 30 tablet 2     metoclopramide (REGLAN) 10 MG tablet Take 1 tablet (10 mg) by mouth 3 times daily (Patient not taking: Reported on 3/7/2019) 120 tablet 3     multivitamin, therapeutic with minerals (MULTI-VITAMIN) TABS tablet Take 1 tablet by mouth daily       predniSONE (DELTASONE) 20 MG tablet Take one tablet daily for one week and then one-half tablet for one week.. 14 tablet 0     timolol (TIMOPTIC) 0.5 % ophthalmic solution Place into both eyes daily       zolpidem (AMBIEN) 10 MG tablet Take 1 tablet (10 mg) by mouth nightly as needed 60 tablet 1       Allergies   Allergen Reactions     Penicillin G Other (See Comments)     Pt not sure-     Penicillins Unknown     History reviewed. No pertinent family history.    Additional medical/Social/Surgical histories reviewed in [facility] and updated as appropriate.     PHYSICAL EXAM  Ht 1.981 m (6' 6\")   Wt 138.3 kg (305 lb)   BMI 35.25 kg/m       General  - normal appearance, in no obvious distress  CV  - normal radial pulse  Pulm  - normal respiratory pattern, " non-labored  Musculoskeletal - Right shoulder  - inspection: normal bone and joint alignment, no obvious deformity, no scapular winging, no AC step-off  - palpation: mildly tender RC insertion, normal clavicle, non-tender AC  - ROM:  painful and limited forward flexion and ER at end range, limited IR to lower lumbar region  - strength: Cuff weakness in abduction, external rotation, internal rotation and painful   - special tests:  (-) Speed's  (+) Neer  (+) Hawkin's  (+) Gordy's  (-) De Witt's  (-) apprehension  (-) subscap lift-off  Neuro  - no sensory or motor deficit, grossly normal coordination, normal muscle tone  Skin  - no ecchymosis, erythema, warmth, or induration, no obvious rash  Psych  - interactive, appropriate, normal mood and affect     ASSESSMENT & PLAN  Mr. Padilla is a 58 year old year old male currently on Keytruda for metastatic esophageal cancer who presents to clinic today with acute on chronic right shoulder pain and known full thickness rotator cuff tear.  At this point we discussed consideration for subacromial bursal injection to augment pain relief.    Diagnosis: Tear of right rotator cuff.  Acute shoulder pain.    -Corticosteroid injection today  -Ice today and PRN  -Analgesics discussed, oxycodone per oncology  -Follow up 3 months, may repeat injection    Subacromial Bursa - Ultrasound Guided  The patient was informed of the risks and the benefits of the procedure and a written consent was signed.  The patient s shoulder was prepped with chlorhexidine in sterile fashion.   40 mg of triamcinolone suspension was drawn up into a 5 mL syringe with 4 mL of 1% lidocaine.  Injection was performed using sterile technique.  Under ultrasound guidance a 1.5-inch 25-gauge needle was used to enter the subacromial bursa.  Anterolateral approach was used with arm held in Crass position.  Needle placement was visualized and documented with ultrasound.  Ultrasound visualization was necessary to ensure  "placement in to the bursa and not the rotator cuff tendon which could potentially cause further tendon damage.  Injection performed long axis to the probe.  Injection solution visualized within the joint space.  Images were permanently stored for the patient's record.  There were no complications. The patient tolerated the procedure well. There was negligible bleeding.   The patient was instructed to ice the shoulder upon leaving clinic and refrain from overuse over the next 3 days.   The patient was instructed to call or go to the emergency room with any unusual pain, swelling, redness, or if otherwise concerned.  A follow up appointment will be scheduled to evaluate response to the injection, and to assess range of motion and pain  It was a pleasure seeing Reuben today.    Willie Wood DO, CAQSM  Primary Care Sports Medicine            SPORTS & ORTHOPEDIC WALK-IN FOLLOW-UP VISIT 3/19/2019    Interval History:     Follow up reason: right shoulder pain    Date of injury: NA    Date last seen: 12/12/2017    Following Therapeutic Plan: Yes     Pain: Unchanged    Function: Unchanged    Interval History: weather, will take \"Oxy\" to help him sleep \"every now and then\"     Medical History:    Any recent changes to your medical history? No    Any new medication prescribed since last visit? No     Large Joint Injection/Arthocentesis: R subacromial bursa  Date/Time: 3/19/2019 8:55 AM  Performed by: Willie Wood DO  Authorized by: Willie Wood DO     Indications:  Pain  Needle Size:  25 G  Guidance: landmark guided    Approach:  Anterolateral  Location:  Shoulder  Site:  R subacromial bursa  Medications:  40 mg triamcinolone 40 MG/ML; 4 mL lidocaine (PF) 1 %  Consent Given by:  Patient    Again, thank you for allowing me to participate in the care of your patient.      Sincerely,    Willie Wood DO      "

## 2019-03-19 NOTE — NURSING NOTE
"Oncology Rooming Note    March 19, 2019 11:14 AM   Reuben Padilla is a 58 year old male who presents for:    Chief Complaint   Patient presents with     Port Draw     Labs drawn via PORT by RN in lab. Line flushed with saline and heparin. VS taken.      Oncology Clinic Visit     Esophageal Ca ,Labs , Tx     Initial Vitals: /78 (BP Location: Right arm, Patient Position: Sitting, Cuff Size: Adult Regular)   Pulse 91   Temp 98.3  F (36.8  C) (Oral)   Resp 18   Wt 141.4 kg (311 lb 11.2 oz)   SpO2 98%   BMI 36.02 kg/m   Estimated body mass index is 36.02 kg/m  as calculated from the following:    Height as of an earlier encounter on 3/19/19: 1.981 m (6' 6\").    Weight as of this encounter: 141.4 kg (311 lb 11.2 oz). Body surface area is 2.79 meters squared.  Moderate Pain (5) Comment: Data Unavailable   No LMP for male patient.  Allergies reviewed: Yes  Medications reviewed: Yes    Medications: Medication refills not needed today.  Pharmacy name entered into Ecast:    [id number] - [place] - [address] [place] [address] NE AT [address] & [facility] [place], [place] - [address] [facility] [phone]  [id number] [place], [place] - [address] [place]    Clinical concerns: no concerns  Nicole was notified.      Jess Loredo MA              "

## 2019-03-21 NOTE — PROGRESS NOTES
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 His tumor is positive for cytokeratin 20, negative for P63 and CK7, and HER2 is negative. He started on FOLFOX (5FU/oxaliplatin) on 5/15/2017. He had a delay in treatment from 9/5-10/2/17 due to work related injury.    He had an excellent response by imaging throughout the summer 2017.    He a mixed response by imaging in late fall 2017.    In January 2018, he was switched to taxol and cyramza - had slight progression and neuropathy and clinical trial became available.       In March 2018, he was started on the [facility] Match Clinical Trial with crizotinib.  (MET amplification) He remained on crizotinib from March - July 2018.    August 2018, he was switched back to taxol/cramza.  In October of 2018, he was switched to Keytruda (PD1 overexpressor).    INTERVAL HISTORY: Levi comes in today for followup. He is now on Keytruda.  At his last visit with Dr Dee there was improved disease on his CT CAP.  There was however a concern for appendicitis- a repeat CT Was done a few days later and reviewed with Dr Lew.  For now, no major concern as no progression on CT and no new symptoms.     Levi continues to struggle with arthralgias -these are particularly in his shoulders, his hands and in his knees but most significantly his hands and shoulders.  As a result, he has had a hard time gripping and lifting his arms.  He previously had a short course of steroids over 6 days with significant improvement in his symptoms.  The symptoms quickly developed.   There is no erythema or swelling over the joints.   This is significantly affecting his QOL. Dr Dee gave him a 20 mg dose with then taper to 10 mg for another week.  He reports again- on the 20 mg the pain was much better  and once he tapered to the 10 mg he already had pain return.          He has had no issues with fevers or chills, no chest pain or shortness of breath, no nausea, vomiting, diarrhea or constipation.      His energy levels are good.  Weight stable though overall is down since starting Keytruda.  He reports 2-3 loose stools per day.  Last night, he had many stools 4-6.  He is using immodium daily often up to 8 of them.    Energy is reasonable.    No dysphagia.        ROS: 10 point ROS neg other than the symptoms noted above in the HPI.    PHYSICAL EXAMINATION  /78 (BP Location: Right arm, Patient Position: Sitting, Cuff Size: Adult Regular)   Pulse 91   Temp 98.3  F (36.8  C) (Oral)   Resp 18   Wt 141.4 kg (311 lb 11.2 oz)   SpO2 98%   BMI 36.02 kg/m     Wt Readings from Last 4 Encounters:   03/19/19 141.4 kg (311 lb 11.2 oz)   03/19/19 138.3 kg (305 lb)   03/07/19 105.3 kg (232 lb 3.2 oz)   02/25/19 140.5 kg (309 lb 12.8 oz)     Constitutional: Alert, oriented male in no visible distress.  Eyes: PERRL. Anicteric sclerae.  ENT/Mouth: OM moist and pink without lesions or thrush.  CV: RRR  Resp: CTAB throughout  Abdomen: Soft, non-tender, non-distended. Obese. Bowel sounds present. Unable to palpate liver or spleen.  No RLQ tenderness.  Extremities: trace edema , no erythema or warmth over joints  Skin: Warm, dry.   Lymph: No cervical or supraclavicular lymphadenopathy appreciated.   Neuro: CN II-XII grossly intact.     MUSC: no erythema or warmth over shoulders, DIPs, PIPs.    Strenght 3/5 in hands bilaterally.Thenar wasting bilaterally    ECOG PS: 1            3/19/2019 10:46   Sodium 141   Potassium 4.0   Chloride 111 (H)   Carbon Dioxide 23   Urea Nitrogen 19   Creatinine 0.73   GFR Estimate >90   GFR Estimate If Black >90   Calcium 9.0   Anion Gap 7   Albumin 3.5   Protein Total 7.1   Bilirubin Total 0.6   Alkaline Phosphatase 78   ALT 33   AST 19   TSH 1.23   Glucose 103 (H)   WBC 5.2   Hemoglobin 13.7    Hematocrit 41.2   Platelet Count 126 (L)   RBC Count 4.54   MCV 91     IMPRESSION/PLAN:  1. Metastatic esophogeal adenocarcinoma. He has had treatment with FOLFOX and then transitioned to Taxol with ramicurimab.  He was then on crizotinib on the [employer] Match study.  He progressed on this and returned on taxol/cyramza. And is now on Keytruda with tumor response on last CT CAP.  Unfortunately, his main toxicity is arthralgias in his shoulders and hands.  Prednisone helps but is extremely short lived.  We discussed whether staying on 10 mg daily would help (and be OK while on immunotherapy) but he felt really the higher doses were more helpful.  We discussed options to manage his pain better with oral and topical analgesics, exercise/swimming and doing more PT perhaps with hand directed therapy.  I wrote out PT orders for him to do locally.  He was willing to try.  Did get a cortisone shot by ortho today and hopefully that will help- his R shoulder is way worse than the left.  I refilled his oxy which he is hesitant to use, but is helpful.     A history of PE.  He is on Lovenox twice daily.  He has progressed through Xarelto in the past.      He does have baseline neuropathy and is on gabapentin 600/300/600 mg daily.         ? Appendicitis on last CT- No symptoms of elevated WBC, fever or pain. Repeat imaging was similar, reviewed with Dr Lew.  No new concerning symptoms today.      Nicole Sutherland PA-C

## 2019-04-08 ENCOUNTER — ONCOLOGY VISIT (OUTPATIENT)
Dept: ONCOLOGY | Facility: CLINIC | Age: 59
End: 2019-04-08
Attending: PHYSICIAN ASSISTANT
Payer: COMMERCIAL

## 2019-04-08 ENCOUNTER — APPOINTMENT (OUTPATIENT)
Dept: LAB | Facility: CLINIC | Age: 59
End: 2019-04-08
Attending: PHYSICIAN ASSISTANT
Payer: COMMERCIAL

## 2019-04-08 VITALS
DIASTOLIC BLOOD PRESSURE: 81 MMHG | HEIGHT: 78 IN | TEMPERATURE: 98.5 F | SYSTOLIC BLOOD PRESSURE: 121 MMHG | WEIGHT: 314 LBS | HEART RATE: 85 BPM | BODY MASS INDEX: 36.33 KG/M2 | RESPIRATION RATE: 16 BRPM | OXYGEN SATURATION: 96 %

## 2019-04-08 DIAGNOSIS — M19.90 INFLAMMATORY ARTHRITIS: Primary | ICD-10-CM

## 2019-04-08 PROCEDURE — 36591 DRAW BLOOD OFF VENOUS DEVICE: CPT

## 2019-04-08 PROCEDURE — G0463 HOSPITAL OUTPT CLINIC VISIT: HCPCS | Mod: ZF

## 2019-04-08 PROCEDURE — 25000128 H RX IP 250 OP 636: Mod: ZF | Performed by: PHYSICIAN ASSISTANT

## 2019-04-08 PROCEDURE — 99215 OFFICE O/P EST HI 40 MIN: CPT | Mod: ZP | Performed by: PHYSICIAN ASSISTANT

## 2019-04-08 RX ORDER — PREDNISONE 20 MG/1
TABLET ORAL
Qty: 70 TABLET | Refills: 0 | Status: SHIPPED | OUTPATIENT
Start: 2019-04-08 | End: 2019-04-29

## 2019-04-08 RX ORDER — HEPARIN SODIUM (PORCINE) LOCK FLUSH IV SOLN 100 UNIT/ML 100 UNIT/ML
5 SOLUTION INTRAVENOUS EVERY 8 HOURS
Status: DISCONTINUED | OUTPATIENT
Start: 2019-04-08 | End: 2019-04-08 | Stop reason: HOSPADM

## 2019-04-08 RX ADMIN — HEPARIN SODIUM (PORCINE) LOCK FLUSH IV SOLN 100 UNIT/ML 5 ML: 100 SOLUTION at 11:36

## 2019-04-08 ASSESSMENT — MIFFLIN-ST. JEOR: SCORE: 2377.54

## 2019-04-08 ASSESSMENT — PAIN SCALES - GENERAL: PAINLEVEL: MODERATE PAIN (4)

## 2019-04-08 NOTE — PROGRESS NOTES
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 His tumor is positive for cytokeratin 20, negative for P63 and CK7, and HER2 is negative. He started on FOLFOX (5FU/oxaliplatin) on 5/15/2017. He had a delay in treatment from 9/5-10/2/17 due to work related injury.    He had an excellent response by imaging throughout the summer 2017.    He a mixed response by imaging in late fall 2017.    In January 2018, he was switched to taxol and cyramza - had slight progression and neuropathy and clinical trial became available.       In March 2018, he was started on the [facility] Match Clinical Trial with crizotinib.  (MET amplification) He remained on crizotinib from March - July 2018.  August 2018, he was switched back to taxol/cramza.    In October of 2018, he was switched to Keytruda (PD1 overexpressor).    INTERVAL HISTORY: Levi comes in today for followup. He is now on Keytruda.  At his last visit with Dr Dee there was improved disease on his CT CAP.  There was however a concern for appendicitis- a repeat CT Was done a few days later and reviewed with Dr Lew.  For now, no major concern as no progression on CT and no new symptoms.     Levi continues to struggle with arthralgias -these are particularly in his shoulders and hands.  As a result, he has had a hard time gripping and lifting his arms and actually has been using a wrench to remove the caps off of bottles.  He cannot squeeze his hand together and has a hard time getting dressed or opening doors.  He previously had a short course of steroids over 6 days with significant improvement in his symptoms.  The symptoms quickly developed.   There is no erythema over the joints but he has developed some mild swelling over the dorsum of his wrists.   This is  "significantly affecting his QOL.          He has had no issues with fevers or chills, no chest pain or shortness of breath, no nausea, vomiting, diarrhea or constipation.  No bleeding on his lovenox.      His energy levels are good.  Eating well without dysphagia.  Weight stable though overall is down since starting Keytruda.  He reports 2-3 loose stools per day.  His stools have decreased quite a bit and he only is using intermittent Imodium verses daily.          ROS: 10 point ROS neg other than the symptoms noted above in the HPI.    PHYSICAL EXAMINATION  /81   Pulse 85   Temp 98.5  F (36.9  C)   Resp 16   Ht 1.981 m (6' 6\")   Wt 142.4 kg (314 lb)   SpO2 96%   BMI 36.29 kg/m     Wt Readings from Last 4 Encounters:   04/08/19 142.4 kg (314 lb)   03/19/19 141.4 kg (311 lb 11.2 oz)   03/19/19 138.3 kg (305 lb)   03/07/19 105.3 kg (232 lb 3.2 oz)     Constitutional: Alert, oriented male in no visible distress.  Eyes: PERRL. Anicteric sclerae.  ENT/Mouth: OM moist and pink without lesions or thrush.  CV: RRR  Resp: CTAB throughout  Abdomen: Soft, non-tender, non-distended. Obese. Bowel sounds present. Unable to palpate liver or spleen.  No RLQ tenderness.  Extremities: trace edema , no erythema over joints, but thenar wasting is noted and swelling over the dorsum of the wrists are noted  Skin: Warm, dry.   Lymph: No cervical or supraclavicular lymphadenopathy appreciated.   Neuro: CN II-XII grossly intact.     MUSC: no erythema or warmth over shoulders, DIPs, PIPs.    Strenght 3/5 in hands bilaterally.Thenar wasting bilaterally    ECOG PS: 1          3/19/2019 10:46 4/8/2019 11:37   Sodium 141 141   Potassium 4.0 4.2   Chloride 111 (H) 109   Carbon Dioxide 23 24   Urea Nitrogen 19 24   Creatinine 0.73 0.74   GFR Estimate >90 >90   GFR Estimate If Black >90 >90   Calcium 9.0 9.4   Anion Gap 7 8   Albumin 3.5 3.8   Protein Total 7.1 7.6   Bilirubin Total 0.6 0.7   Alkaline Phosphatase 78 90   ALT 33 27 " "  AST 19 16   TSH 1.23 1.44   Glucose 103 (H) 102 (H)         IMPRESSION/PLAN:  1. Metastatic esophogeal adenocarcinoma. He has had treatment with FOLFOX and then transitioned to Taxol with ramicurimab.  He was then on crizotinib on the [employer] Match study.  He progressed on this and returned on taxol/cyramza. And is now on Keytruda with tumor response on last CT CAP.    --Unfortunately, his main toxicity is arthralgias in his shoulders and hands.  Levi is tearful today with the pain and limitations with his daily activities.  He has tried oral and topical analgesics, exercise/swimming and doing more PT perhaps with hand directed therapy. Despite this, his arthralgias worsened this last 3 week cycle.  We reviewed the NCCN guidelines and he really is in the \"severe toxicity\" range which states we should stop Keytruda and dose 1 mg/kg of prednisone.  Given this would be 140 mg of prednisone, we elected to start at 80 mg PO x 1 week and taper. If he is not responding, we could try a higher dose or involve rheumatology.  I will follow up with him via phone in one week.  We'll cancel the next keytruda, but keep the clinic appt.  We reviewed toxicities of steroids including insomnia, mood changes, GERD (he will start prilosec) and blood sugar elevations (counseled on watching sugar intake).      A history of PE.  He is on Lovenox twice daily.  He has progressed through Xarelto in the past.      He does have baseline neuropathy and is on gabapentin 600/300/600 mg daily.         ? Appendicitis on last CT- No symptoms of elevated WBC, fever or pain. Repeat imaging was similar, reviewed with Dr Lew.  No new concerning symptoms today.      Nicole Sutherland PA-C         "

## 2019-04-08 NOTE — NURSING NOTE
"Oncology Rooming Note    April 8, 2019 12:12 PM   Reuben Padilla is a 58 year old male who presents for:    Chief Complaint   Patient presents with     Port Draw     ACCESSED WITH POWER NEEDLE, HEPARIN LOCKED, VITALS CHECKED     Initial Vitals: /81   Pulse 85   Temp 98.5  F (36.9  C)   Resp 16   Ht 1.981 m (6' 6\")   Wt 142.4 kg (314 lb)   SpO2 96%   BMI 36.29 kg/m   Estimated body mass index is 36.29 kg/m  as calculated from the following:    Height as of this encounter: 1.981 m (6' 6\").    Weight as of this encounter: 142.4 kg (314 lb). Body surface area is 2.8 meters squared.  Moderate Pain (4) Comment: Data Unavailable   No LMP for male patient.  Allergies reviewed: Yes  Medications reviewed: Yes    Medications: Medication refills not needed today.  Pharmacy name entered into EPIC:    [id number] - [place], [place] - [address] [place] [address] AT [address] & [facility] [place], [place] - [address] [facility] [phone]  [facility] - [place], [place] - [address] [facility]    Clinical concerns: N/A      Bri Harris CMA              "

## 2019-04-15 ENCOUNTER — TELEPHONE (OUTPATIENT)
Dept: ONCOLOGY | Facility: CLINIC | Age: 59
End: 2019-04-15

## 2019-04-15 NOTE — LETTER
4/15/2019      RE: Reuben Padilla  [address] [place] [id number]       I called Levi to follow up on his severe arthralgias.  Since starting the 80 mg of prednisone he has had a dramatic improvement in the pain in his hands- they are currently down to a 1/10.  He can fully make a fist and he has been working with PT on hand exercises.  His left shoulder pain is about a 2/10 and the right is still the more painful one at 3/10.  He is getting dressed easier and has improved mobility but still having some pain.  He has been really closely working with PT and trying new strengthening exercises.       We discussed 3 more days of the 80 mg of prednisone (total of 10 days), then drop down to 60 mg a day and I'll call him again next Monday.     Nicole Sutherland PA-C

## 2019-04-15 NOTE — LETTER
4/15/2019      RE: Reuben Padilla  [address] [place] [id number]       I called Levi to follow up on his severe arthralgias.  Since starting the 80 mg of prednisone he has had a dramatic improvement in the pain in his hands- they are currently down to a 1/10.  He can fully make a fist and he has been working with PT on hand exercises.  His left shoulder pain is about a 2/10 and the right is still the more painful one at 3/10.  He is getting dressed easier and has improved mobility but still having some pain.  He has been really closely working with PT and trying new strengthening exercises.       We discussed 3 more days of the 80 mg of prednisone (total of 10 days), then drop down to 60 mg a day and I'll call him again next Monday.     Nicole Sutherland PA-C

## 2019-04-15 NOTE — TELEPHONE ENCOUNTER
I called Levi to follow up on his severe arthralgias.  Since starting the 80 mg of prednisone he has had a dramatic improvement in the pain in his hands- they are currently down to a 1/10.  He can fully make a fist and he has been working with PT on hand exercises.  His left shoulder pain is about a 2/10 and the right is still the more painful one at 3/10.  He is getting dressed easier and has improved mobility but still having some pain.  He has been really closely working with PT and trying new strengthening exercises.       We discussed 3 more days of the 80 mg of prednisone (total of 10 days), then drop down to 60 mg a day and I'll call him again next Monday.     Nicole Sutherland PA-C

## 2019-04-16 ENCOUNTER — TELEPHONE (OUTPATIENT)
Dept: ONCOLOGY | Facility: CLINIC | Age: 59
End: 2019-04-16

## 2019-04-16 NOTE — TELEPHONE ENCOUNTER
Calling from dental office. Planning for upcoming appt to fill cavities. Wondering if needs any precautions. IB sent to Nicole Sutherland PA-C to advise.

## 2019-04-17 NOTE — TELEPHONE ENCOUNTER
Per Nicole, no problem with getting cavities filled and getting Keytruda. LM for Levi with this information

## 2019-04-23 ENCOUNTER — TELEPHONE (OUTPATIENT)
Dept: ONCOLOGY | Facility: CLINIC | Age: 59
End: 2019-04-23

## 2019-04-23 NOTE — TELEPHONE ENCOUNTER
4/23/19-    Called Levi to check in on his immune related arthralgias.  We started him at 80 mg of prednisone for 10 days and now he has tapered down to 60 mg.  Later this week, he will taper to 40, etc.     Overall, the severe pain in the shoulders and hands is 0-1/10, more of a stiffness.  He is doing PT daily in addition to working on his total gym and hand exercises.    He feels he is doing really well.     Discussed he needs to be off the steroids, improved and stable before restarting the Keytruda, thus we are OK to cancel next weeks appt and I will keep calling him weekly.     He is OK with plan and aside from being extra hungry, tolerating the prednisone well.     Nicole Sutherland PA-C

## 2019-04-26 ENCOUNTER — TELEPHONE (OUTPATIENT)
Dept: PEDIATRICS | Facility: CLINIC | Age: 59
End: 2019-04-26

## 2019-04-26 ENCOUNTER — MYC MEDICAL ADVICE (OUTPATIENT)
Dept: ONCOLOGY | Facility: CLINIC | Age: 59
End: 2019-04-26

## 2019-04-26 DIAGNOSIS — C15.5 CANCER OF DISTAL THIRD OF ESOPHAGUS (H): Primary | ICD-10-CM

## 2019-04-26 NOTE — TELEPHONE ENCOUNTER
Patient calling back, very upset because the clinic didn't get back to him regarding changing his Ambien RX.Advised there was nothing noted per epic. He was advised to call PCP on Monday 4/26.  No triage was needed.  Rin Rose RN [facility]

## 2019-04-29 DIAGNOSIS — M19.90 INFLAMMATORY ARTHRITIS: ICD-10-CM

## 2019-04-29 RX ORDER — PREDNISONE 20 MG/1
TABLET ORAL
Qty: 13 TABLET | Refills: 0 | Status: SHIPPED | OUTPATIENT
Start: 2019-04-29 | End: 2019-05-20

## 2019-04-29 RX ORDER — AMITRIPTYLINE HYDROCHLORIDE 10 MG/1
10 TABLET ORAL AT BEDTIME
Qty: 30 TABLET | Refills: 0
Start: 2019-04-29 | End: 2019-05-20

## 2019-04-29 NOTE — PROGRESS NOTES
4/29/19    Levi called today for his weekly follow up.  Since being on steroids, I have been calling him weekly to ensure adequate relief.  He currently is on 40 mg and doing well- NO pain, only stiffness in the shoulders and hands and he is working on total gym daily and doing PT hand exercises to get back strength.  Eating a lot and trying to control the intake.  Having some leg cramps but hydrating well and wearing compression socks as he has some mild PAOLA.  He is concerned about his sleep and I told him Dr Dee has called in amitriptyline to his local pharmacy.       Our plan is to reduce to 20 mg on Friday 5/3 and 10 mg on 5/9.   He will then come for his CT and Dr Dee appt on 5/20.  They can discuss restarting Keytruda at that time.     Nicole Sutherland PA-C

## 2019-04-29 NOTE — TELEPHONE ENCOUNTER
"Per Dr. Dee, \"Please start amitryptiline 10 mg at bedtime.  Stop the ambien.\"     Writer called Rx to [facility]. Writer attempted to call patient with no answer, [employer] message notifying patient of new Rx.     [facility]    "

## 2019-05-20 ENCOUNTER — INFUSION THERAPY VISIT (OUTPATIENT)
Dept: ONCOLOGY | Facility: CLINIC | Age: 59
End: 2019-05-20
Attending: INTERNAL MEDICINE
Payer: COMMERCIAL

## 2019-05-20 ENCOUNTER — TELEPHONE (OUTPATIENT)
Dept: RHEUMATOLOGY | Facility: CLINIC | Age: 59
End: 2019-05-20

## 2019-05-20 ENCOUNTER — ANCILLARY PROCEDURE (OUTPATIENT)
Dept: CT IMAGING | Facility: CLINIC | Age: 59
End: 2019-05-20
Attending: INTERNAL MEDICINE
Payer: COMMERCIAL

## 2019-05-20 VITALS
RESPIRATION RATE: 16 BRPM | HEIGHT: 78 IN | WEIGHT: 315 LBS | OXYGEN SATURATION: 95 % | HEART RATE: 79 BPM | SYSTOLIC BLOOD PRESSURE: 123 MMHG | DIASTOLIC BLOOD PRESSURE: 93 MMHG | TEMPERATURE: 98.4 F | BODY MASS INDEX: 36.45 KG/M2

## 2019-05-20 DIAGNOSIS — D70.1 CHEMOTHERAPY-INDUCED NEUTROPENIA (H): ICD-10-CM

## 2019-05-20 DIAGNOSIS — T45.1X5A CHEMOTHERAPY-INDUCED NEUTROPENIA (H): ICD-10-CM

## 2019-05-20 DIAGNOSIS — C15.5 CANCER OF DISTAL THIRD OF ESOPHAGUS (H): Primary | ICD-10-CM

## 2019-05-20 DIAGNOSIS — M19.90 ARTHRITIS: Primary | ICD-10-CM

## 2019-05-20 DIAGNOSIS — M19.90 ARTHRITIS: ICD-10-CM

## 2019-05-20 DIAGNOSIS — C15.5 CANCER OF DISTAL THIRD OF ESOPHAGUS (H): ICD-10-CM

## 2019-05-20 LAB
ALBUMIN SERPL-MCNC: 3.8 G/DL (ref 3.4–5)
ALP SERPL-CCNC: 119 U/L (ref 40–150)
ALT SERPL W P-5'-P-CCNC: 26 U/L (ref 0–70)
ANION GAP SERPL CALCULATED.3IONS-SCNC: 7 MMOL/L (ref 3–14)
AST SERPL W P-5'-P-CCNC: 16 U/L (ref 0–45)
BASOPHILS # BLD AUTO: 0.1 10E9/L (ref 0–0.2)
BASOPHILS NFR BLD AUTO: 0.9 %
BILIRUB SERPL-MCNC: 0.9 MG/DL (ref 0.2–1.3)
BUN SERPL-MCNC: 25 MG/DL (ref 7–30)
CALCIUM SERPL-MCNC: 9.2 MG/DL (ref 8.5–10.1)
CHLORIDE SERPL-SCNC: 109 MMOL/L (ref 94–109)
CO2 SERPL-SCNC: 24 MMOL/L (ref 20–32)
CREAT BLD-MCNC: 0.6 MG/DL (ref 0.66–1.25)
CREAT SERPL-MCNC: 0.91 MG/DL (ref 0.66–1.25)
DIFFERENTIAL METHOD BLD: NORMAL
EOSINOPHIL # BLD AUTO: 0 10E9/L (ref 0–0.7)
EOSINOPHIL NFR BLD AUTO: 0.7 %
ERYTHROCYTE [DISTWIDTH] IN BLOOD BY AUTOMATED COUNT: 14.8 % (ref 10–15)
GFR SERPL CREATININE-BSD FRML MDRD: >90 ML/MIN/{1.73_M2}
GFR SERPL CREATININE-BSD FRML MDRD: >90 ML/MIN/{1.73_M2}
GLUCOSE SERPL-MCNC: 106 MG/DL (ref 70–99)
HBV SURFACE AB SERPL IA-ACNC: 0.14 M[IU]/ML
HBV SURFACE AG SERPL QL IA: NONREACTIVE
HCT VFR BLD AUTO: 41 % (ref 40–53)
HCV AB SERPL QL IA: NONREACTIVE
HGB BLD-MCNC: 13.7 G/DL (ref 13.3–17.7)
IMM GRANULOCYTES # BLD: 0.1 10E9/L (ref 0–0.4)
IMM GRANULOCYTES NFR BLD: 0.9 %
LYMPHOCYTES # BLD AUTO: 1.6 10E9/L (ref 0.8–5.3)
LYMPHOCYTES NFR BLD AUTO: 27.7 %
MCH RBC QN AUTO: 30.3 PG (ref 26.5–33)
MCHC RBC AUTO-ENTMCNC: 33.4 G/DL (ref 31.5–36.5)
MCV RBC AUTO: 91 FL (ref 78–100)
MONOCYTES # BLD AUTO: 0.4 10E9/L (ref 0–1.3)
MONOCYTES NFR BLD AUTO: 6.7 %
NEUTROPHILS # BLD AUTO: 3.7 10E9/L (ref 1.6–8.3)
NEUTROPHILS NFR BLD AUTO: 63.1 %
NRBC # BLD AUTO: 0 10*3/UL
NRBC BLD AUTO-RTO: 0 /100
PLATELET # BLD AUTO: 178 10E9/L (ref 150–450)
POTASSIUM SERPL-SCNC: 4.2 MMOL/L (ref 3.4–5.3)
PROT SERPL-MCNC: 7.3 G/DL (ref 6.8–8.8)
RBC # BLD AUTO: 4.52 10E12/L (ref 4.4–5.9)
SODIUM SERPL-SCNC: 141 MMOL/L (ref 133–144)
TSH SERPL DL<=0.005 MIU/L-ACNC: 3.75 MU/L (ref 0.4–4)
WBC # BLD AUTO: 5.8 10E9/L (ref 4–11)

## 2019-05-20 PROCEDURE — 99215 OFFICE O/P EST HI 40 MIN: CPT | Mod: ZP | Performed by: INTERNAL MEDICINE

## 2019-05-20 PROCEDURE — 86706 HEP B SURFACE ANTIBODY: CPT | Performed by: INTERNAL MEDICINE

## 2019-05-20 PROCEDURE — G0463 HOSPITAL OUTPT CLINIC VISIT: HCPCS | Mod: ZF

## 2019-05-20 PROCEDURE — 84443 ASSAY THYROID STIM HORMONE: CPT | Performed by: INTERNAL MEDICINE

## 2019-05-20 PROCEDURE — 86803 HEPATITIS C AB TEST: CPT | Performed by: INTERNAL MEDICINE

## 2019-05-20 PROCEDURE — 96413 CHEMO IV INFUSION 1 HR: CPT

## 2019-05-20 PROCEDURE — 80053 COMPREHEN METABOLIC PANEL: CPT | Performed by: INTERNAL MEDICINE

## 2019-05-20 PROCEDURE — 87340 HEPATITIS B SURFACE AG IA: CPT | Performed by: INTERNAL MEDICINE

## 2019-05-20 PROCEDURE — 25800030 ZZH RX IP 258 OP 636: Mod: ZF | Performed by: INTERNAL MEDICINE

## 2019-05-20 PROCEDURE — 25000128 H RX IP 250 OP 636: Mod: ZF | Performed by: INTERNAL MEDICINE

## 2019-05-20 PROCEDURE — 85025 COMPLETE CBC W/AUTO DIFF WBC: CPT | Performed by: INTERNAL MEDICINE

## 2019-05-20 RX ORDER — HEPARIN SODIUM (PORCINE) LOCK FLUSH IV SOLN 100 UNIT/ML 100 UNIT/ML
500 SOLUTION INTRAVENOUS ONCE
Status: CANCELLED | OUTPATIENT
Start: 2019-05-20

## 2019-05-20 RX ORDER — DIPHENHYDRAMINE HYDROCHLORIDE 50 MG/ML
50 INJECTION INTRAMUSCULAR; INTRAVENOUS
Status: CANCELLED
Start: 2019-05-20

## 2019-05-20 RX ORDER — OXYCODONE HYDROCHLORIDE 10 MG/1
10 TABLET ORAL EVERY 4 HOURS PRN
Qty: 60 TABLET | Refills: 0 | Status: SHIPPED | OUTPATIENT
Start: 2019-05-20 | End: 2019-08-13

## 2019-05-20 RX ORDER — SODIUM CHLORIDE 9 MG/ML
1000 INJECTION, SOLUTION INTRAVENOUS CONTINUOUS PRN
Status: CANCELLED
Start: 2019-05-20

## 2019-05-20 RX ORDER — EPINEPHRINE 1 MG/ML
0.3 INJECTION, SOLUTION INTRAMUSCULAR; SUBCUTANEOUS EVERY 5 MIN PRN
Status: CANCELLED | OUTPATIENT
Start: 2019-05-20

## 2019-05-20 RX ORDER — MEPERIDINE HYDROCHLORIDE 25 MG/ML
25 INJECTION INTRAMUSCULAR; INTRAVENOUS; SUBCUTANEOUS EVERY 30 MIN PRN
Status: CANCELLED | OUTPATIENT
Start: 2019-05-20

## 2019-05-20 RX ORDER — EPINEPHRINE 0.3 MG/.3ML
0.3 INJECTION SUBCUTANEOUS EVERY 5 MIN PRN
Status: CANCELLED | OUTPATIENT
Start: 2019-05-20

## 2019-05-20 RX ORDER — AMITRIPTYLINE HYDROCHLORIDE 10 MG/1
10 TABLET ORAL AT BEDTIME
Qty: 30 TABLET | Refills: 3 | Status: SHIPPED | OUTPATIENT
Start: 2019-05-20 | End: 2019-07-22

## 2019-05-20 RX ORDER — HEPARIN SODIUM (PORCINE) LOCK FLUSH IV SOLN 100 UNIT/ML 100 UNIT/ML
500 SOLUTION INTRAVENOUS ONCE
Status: COMPLETED | OUTPATIENT
Start: 2019-05-20 | End: 2019-05-20

## 2019-05-20 RX ORDER — HEPARIN SODIUM (PORCINE) LOCK FLUSH IV SOLN 100 UNIT/ML 100 UNIT/ML
5 SOLUTION INTRAVENOUS DAILY PRN
Status: DISCONTINUED | OUTPATIENT
Start: 2019-05-20 | End: 2019-05-28 | Stop reason: HOSPADM

## 2019-05-20 RX ORDER — IOPAMIDOL 755 MG/ML
135 INJECTION, SOLUTION INTRAVASCULAR ONCE
Status: COMPLETED | OUTPATIENT
Start: 2019-05-20 | End: 2019-05-20

## 2019-05-20 RX ORDER — ALBUTEROL SULFATE 90 UG/1
1-2 AEROSOL, METERED RESPIRATORY (INHALATION)
Status: CANCELLED
Start: 2019-05-20

## 2019-05-20 RX ORDER — ALBUTEROL SULFATE 0.83 MG/ML
2.5 SOLUTION RESPIRATORY (INHALATION)
Status: CANCELLED | OUTPATIENT
Start: 2019-05-20

## 2019-05-20 RX ORDER — LORAZEPAM 2 MG/ML
0.5 INJECTION INTRAMUSCULAR EVERY 4 HOURS PRN
Status: CANCELLED
Start: 2019-05-20

## 2019-05-20 RX ORDER — METHYLPREDNISOLONE SODIUM SUCCINATE 125 MG/2ML
125 INJECTION, POWDER, LYOPHILIZED, FOR SOLUTION INTRAMUSCULAR; INTRAVENOUS
Status: CANCELLED
Start: 2019-05-20

## 2019-05-20 RX ADMIN — SODIUM CHLORIDE 250 ML: 9 INJECTION, SOLUTION INTRAVENOUS at 10:06

## 2019-05-20 RX ADMIN — SODIUM CHLORIDE 200 MG: 9 INJECTION, SOLUTION INTRAVENOUS at 10:06

## 2019-05-20 RX ADMIN — HEPARIN 500 UNITS: 100 SYRINGE at 10:37

## 2019-05-20 RX ADMIN — IOPAMIDOL 135 ML: 755 INJECTION, SOLUTION INTRAVASCULAR at 07:22

## 2019-05-20 RX ADMIN — HEPARIN 5 ML: 100 SYRINGE at 06:31

## 2019-05-20 ASSESSMENT — MIFFLIN-ST. JEOR: SCORE: 2475.52

## 2019-05-20 ASSESSMENT — PAIN SCALES - GENERAL: PAINLEVEL: MILD PAIN (3)

## 2019-05-20 NOTE — TELEPHONE ENCOUNTER
[facility] Call Center    Phone Message    May a detailed message be left on voicemail: yes    Reason for Call: Other: Clinic called in with pt wanting to get scheduled for his referral. Writer did explain that it willbe about 3 weeks before pt would hear from us. Thank you     Action Taken: Message routed to:  [facility] ([facility] Rheum

## 2019-05-20 NOTE — PROGRESS NOTES
SPIRITUAL HEALTH SERVICES  SPIRITUAL ASSESSMENT Progress Note  [facility]     REFERRAL SOURCE:  Sridhar      Introduced myself and SHS to Levi and his family - son, Maulik and sister, Natali    Explained to Levi how to contact a  while at the [facility] should he want a future visit    He declined a visit today    PLAN: SHS will continue to spiritually support Levi while he is receiving treatment at the [facility].    Jessica Cabrera  Chaplain Resident

## 2019-05-20 NOTE — PATIENT INSTRUCTIONS
Dr Dee sent this request to scheduling:  -Keytruda IV every three weeks with labs; appt with Nicole with each infusion.  -Appt with me with infusion in 6 or 9 weeks.  -Refer to rheumatology.    Contact Numbers  [facility]: [phone]    After Hours:  [phone]  Triage: [phone]    Please call the [facility] Triage line if you experience a temperature greater than or equal to 100.5, shaking chills, have uncontrolled nausea, vomiting and/or diarrhea, dizziness, shortness of breath, chest pain, bleeding, unexplained bruising, or if you have any other new/concerning symptoms, questions or concerns.     If it is after hours, weekends, or holidays, please call the main hospital  at  [phone] and ask to speak to the Oncology doctor on call.     If you are having any concerning symptoms or wish to speak to a provider before your next infusion visit, please call your care coordinator or triage to notify them so we can adequately serve you.     If you need a refill on a narcotic prescription or other medication, please call triage before your infusion appointment.

## 2019-05-20 NOTE — PROGRESS NOTES
Infusion Nursing Note:  Reuben Padilla presents today for C9 Keytruda.    Patient seen by provider today: Yes: Dr Dee    Treatment Conditions:  Lab Results   Component Value Date    HGB 13.7 05/20/2019     Lab Results   Component Value Date    WBC 5.8 05/20/2019      Lab Results   Component Value Date    ANEU 3.7 05/20/2019     Lab Results   Component Value Date     05/20/2019      Lab Results   Component Value Date     05/20/2019                   Lab Results   Component Value Date    POTASSIUM 4.2 05/20/2019                  Lab Results   Component Value Date    CR 0.91 05/20/2019                   Lab Results   Component Value Date    NIDHI 9.2 05/20/2019                Lab Results   Component Value Date    BILITOTAL 0.9 05/20/2019           Lab Results   Component Value Date    ALBUMIN 3.8 05/20/2019                    Lab Results   Component Value Date    ALT 26 05/20/2019           Lab Results   Component Value Date    AST 16 05/20/2019       Results reviewed, labs MET treatment parameters, ok to proceed with treatment.    Intravenous Access:  Implanted Port.  Access dc'd at time of discharge.      Note:   Results reviewed, copy given to patient.  Proceed with treatment.    Copy of AVS given to patient. + Blood return from PORT pre and post infusion.  Tolerated infusion without incident. No Prescriptions filled today.   D/C in care of spouse.  Checkout orders not completed prior to discharge, pt given copy and will follow up with FD.      Barbara Charles RN

## 2019-05-20 NOTE — DISCHARGE INSTRUCTIONS

## 2019-05-20 NOTE — LETTER
5/20/2019       RE: Reuben Padilla  [address] [place] [id number]     Dear Colleague,    Thank you for referring your patient, Reuben Padilla, to the [facility]. Please see a copy of my visit note below.    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 His tumor is positive for cytokeratin 20, negative for P63 and CK7, and HER2 is negative. He started on FOLFOX (5FU/oxaliplatin) on 5/15/2017. He had a delay in treatment from 9/5-10/2/17 due to work related injury.    He had an excellent response by imaging throughout the summer 2017.    He a mixed response by imaging in late fall 2017.    In January 2018, he was switched to taxol and cyramza - had slight progression and neuropathy and clinical trial became available.       In March 2018, he was started on the [facility] Match Clinical Trial with crizotinib.  (MET amplification) He remained on crizotinib from March - July 2018.    August 2018, he was switched back to taxol/cramza.  In October, he was switched to Keytruda (PD1 overexpressor).    In April 2019, his infusion was held for three weeks, and he was treated with prednisone for grade 3 arthralgias.    INTERVAL HISTORY: Levi comes in today for followup. He is now on Keytruda.  He is here for restaging.  He had his Keytruda held the last three weeks given signficant arthralgias.  He has now tapered off of prednisone.  The arthralgias have improved significantly.       He has had no issues with fevers or chills, no chest pain or shortness of breath, no nausea, vomiting, diarrhea or constipation.  He uses lomotil 3-4 times in the last two weeks.      He was out doing yard pickups, and fell in a divet in the ground.  He landed on his buttocks.  This resulted in back pain.  He has  "seen his chiropractor for the back pain.  He rarely will take an oxycodone for the discomfort.  He also noticed pain in the right forearm after the fall.  This is \"sore\".  He notices no weakness.        He has no abdminal pain.  No n/v/d/c.       His energy levels are good.      No dysphagia.        ROS: 10 point ROS neg other than the symptoms noted above in the HPI.    PHYSICAL EXAMINATION  BP (!) 123/93   Pulse 79   Temp 98.4  F (36.9  C) (Oral)   Resp 16   Ht 1.981 m (6' 6\")   Wt (!) 152.2 kg (335 lb 9.6 oz)   SpO2 95%   BMI 38.78 kg/m      Wt Readings from Last 4 Encounters:   05/20/19 (!) 152.2 kg (335 lb 9.6 oz)   04/08/19 142.4 kg (314 lb)   03/19/19 141.4 kg (311 lb 11.2 oz)   03/19/19 138.3 kg (305 lb)     Constitutional: Alert, oriented male in no visible distress.  Eyes: PERRL. Anicteric sclerae.  ENT/Mouth: OM moist and pink without lesions or thrush.  CV: RRR  Resp: CTAB throughout  Abdomen: Soft, non-tender, non-distended. Obese. Bowel sounds present. Unable to palpate liver or spleen.  No RLQ tenderness.  Extremities: trace edema , no erythema or warmth over joints  Skin: Warm, dry.   Lymph: No cervical or supraclavicular lymphadenopathy appreciated.   Neuro: CN II-XII grossly intact.     MUSC: no erythema or warmth over shoulders, DIPs, PIPs.  Hand strength normal  ECOG PS: 1       Lab Results   Component Value Date    WBC 5.8 05/20/2019     Lab Results   Component Value Date    RBC 4.52 05/20/2019     Lab Results   Component Value Date    HGB 13.7 05/20/2019     Lab Results   Component Value Date    HCT 41.0 05/20/2019     No components found for: MCT  Lab Results   Component Value Date    MCV 91 05/20/2019     Lab Results   Component Value Date    MCH 30.3 05/20/2019     Lab Results   Component Value Date    MCHC 33.4 05/20/2019     Lab Results   Component Value Date    RDW 14.8 05/20/2019     Lab Results   Component Value Date     05/20/2019       Recent Labs   Lab Test " 05/20/19  0637 04/08/19  1137    141   POTASSIUM 4.2 4.2   CHLORIDE 109 109   CO2 24 24   ANIONGAP 7 8   * 102*   BUN 25 24   CR 0.91 0.74   NIDHI 9.2 9.4     Liver Function Studies -   Recent Labs   Lab Test 05/20/19  0637   PROTTOTAL 7.3   ALBUMIN 3.8   BILITOTAL 0.9   ALKPHOS 119   AST 16   ALT 26     CT - by prelim review is stable with no s/s of progressive disease    IMPRESSION/PLAN:  1. Metastatic esophogeal adenocarcinoma. He has had treatment with FOLFOX and then transitioned to Taxol with ramicurimab.  He was then on crizotinib on the [employer] Match study.  He progressed on this and returned on taxol/cyramza. And is now on Keytruda.    I am very pleased with how he is doing on Keytruda.  He did develop grade 3 arthralgias.  These have now resolved.  We will restart Keytruda.  The arthralgias may reoccur.  I'd like him to see rheumatology as per ICI guidelines.  Will also check hepatitis B and C testing.      Will plan to repeat imaging in 12 weeks.        We discussed that he was screened for entrectinib and does not have identifiable mutations for this medication.    Other treatment options would include possible irinotecan or consideration of a phase 1 study (NK FATE)     A history of PE.  He is on Lovenox twice daily.  He has progressed through Xarelto in the past.      He does have baseline neuropathy and is on gabapentin 600/300/600 mg daily.         ? Appendicitis - No symptoms of elevated WBC, fever or pain.  On imaging this is stable.  It is likely related to Keytruda.     He understands his therapies are not curative intent but rather palliative.  Additional supportive care measures are discussed.    Kadi Dee MD

## 2019-05-20 NOTE — NURSING NOTE
Chief Complaint   Patient presents with     Port Draw     Labs drawn via port by RN in lab.      Labs drawn via Port accessed using 20g flat needle. Line flushed and Heparin locked.     Billie Montalvo RN

## 2019-05-20 NOTE — PROGRESS NOTES
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 His tumor is positive for cytokeratin 20, negative for P63 and CK7, and HER2 is negative. He started on FOLFOX (5FU/oxaliplatin) on 5/15/2017. He had a delay in treatment from 9/5-10/2/17 due to work related injury.    He had an excellent response by imaging throughout the summer 2017.    He a mixed response by imaging in late fall 2017.    In January 2018, he was switched to taxol and cyramza - had slight progression and neuropathy and clinical trial became available.       In March 2018, he was started on the [facility] Match Clinical Trial with crizotinib.  (MET amplification) He remained on crizotinib from March - July 2018.    August 2018, he was switched back to taxol/cramza.  In October, he was switched to Keytruda (PD1 overexpressor).    In April 2019, his infusion was held for three weeks, and he was treated with prednisone for grade 3 arthralgias.    INTERVAL HISTORY: Levi comes in today for followup. He is now on Keytruda.  He is here for restaging.  He had his Keytruda held the last three weeks given signficant arthralgias.  He has now tapered off of prednisone.  The arthralgias have improved significantly.       He has had no issues with fevers or chills, no chest pain or shortness of breath, no nausea, vomiting, diarrhea or constipation.  He uses lomotil 3-4 times in the last two weeks.      He was out doing yard pickups, and fell in a divet in the ground.  He landed on his buttocks.  This resulted in back pain.  He has seen his chiropractor for the back pain.  He rarely will take an oxycodone for the discomfort.  He also noticed pain in the right forearm after the fall.  This is \"sore\".  He notices no weakness.        He has no abdminal pain.  No n/v/d/c. " "      His energy levels are good.      No dysphagia.        ROS: 10 point ROS neg other than the symptoms noted above in the HPI.    PHYSICAL EXAMINATION  BP (!) 123/93   Pulse 79   Temp 98.4  F (36.9  C) (Oral)   Resp 16   Ht 1.981 m (6' 6\")   Wt (!) 152.2 kg (335 lb 9.6 oz)   SpO2 95%   BMI 38.78 kg/m     Wt Readings from Last 4 Encounters:   05/20/19 (!) 152.2 kg (335 lb 9.6 oz)   04/08/19 142.4 kg (314 lb)   03/19/19 141.4 kg (311 lb 11.2 oz)   03/19/19 138.3 kg (305 lb)     Constitutional: Alert, oriented male in no visible distress.  Eyes: PERRL. Anicteric sclerae.  ENT/Mouth: OM moist and pink without lesions or thrush.  CV: RRR  Resp: CTAB throughout  Abdomen: Soft, non-tender, non-distended. Obese. Bowel sounds present. Unable to palpate liver or spleen.  No RLQ tenderness.  Extremities: trace edema , no erythema or warmth over joints  Skin: Warm, dry.   Lymph: No cervical or supraclavicular lymphadenopathy appreciated.   Neuro: CN II-XII grossly intact.     MUSC: no erythema or warmth over shoulders, DIPs, PIPs.  Hand strength normal  ECOG PS: 1       Lab Results   Component Value Date    WBC 5.8 05/20/2019     Lab Results   Component Value Date    RBC 4.52 05/20/2019     Lab Results   Component Value Date    HGB 13.7 05/20/2019     Lab Results   Component Value Date    HCT 41.0 05/20/2019     No components found for: MCT  Lab Results   Component Value Date    MCV 91 05/20/2019     Lab Results   Component Value Date    MCH 30.3 05/20/2019     Lab Results   Component Value Date    MCHC 33.4 05/20/2019     Lab Results   Component Value Date    RDW 14.8 05/20/2019     Lab Results   Component Value Date     05/20/2019       Recent Labs   Lab Test 05/20/19  0637 04/08/19  1137    141   POTASSIUM 4.2 4.2   CHLORIDE 109 109   CO2 24 24   ANIONGAP 7 8   * 102*   BUN 25 24   CR 0.91 0.74   NIDHI 9.2 9.4     Liver Function Studies -   Recent Labs   Lab Test 05/20/19  0637   PROTTOTAL 7.3 "   ALBUMIN 3.8   BILITOTAL 0.9   ALKPHOS 119   AST 16   ALT 26     CT - by prelim review is stable with no s/s of progressive disease    IMPRESSION/PLAN:  1. Metastatic esophogeal adenocarcinoma. He has had treatment with FOLFOX and then transitioned to Taxol with ramicurimab.  He was then on crizotinib on the [employer]CI Match study.  He progressed on this and returned on taxol/cyramza. And is now on Keytruda.    I am very pleased with how he is doing on Keytruda.  He did develop grade 3 arthralgias.  These have now resolved.  We will restart Keytruda.  The arthralgias may reoccur.  I'd like him to see rheumatology as per ICI guidelines.  Will also check hepatitis B and C testing.      Will plan to repeat imaging in 12 weeks.        We discussed that he was screened for entrectinib and does not have identifiable mutations for this medication.    Other treatment options would include possible irinotecan or consideration of a phase 1 study (NK FATE)     A history of PE.  He is on Lovenox twice daily.  He has progressed through Xarelto in the past.      He does have baseline neuropathy and is on gabapentin 600/300/600 mg daily.         ? Appendicitis - No symptoms of elevated WBC, fever or pain.  On imaging this is stable.  It is likely related to Keytruda.     He understands his therapies are not curative intent but rather palliative.  Additional supportive care measures are discussed.    Kadi Dee MD

## 2019-05-20 NOTE — NURSING NOTE
"Oncology Rooming Note    May 20, 2019 8:54 AM   Reuben Padilla is a 58 year old male who presents for:    Chief Complaint   Patient presents with     Oncology Clinic Visit     RETURN VISIT; ESOPHAGEAL CA RETURN; VITALS COMPLETED BY [facility]      Initial Vitals: BP (!) 123/93   Pulse 79   Temp 98.4  F (36.9  C) (Oral)   Resp 16   Ht 1.981 m (6' 6\")   Wt (!) 152.2 kg (335 lb 9.6 oz)   SpO2 95%   BMI 38.78 kg/m   Estimated body mass index is 38.78 kg/m  as calculated from the following:    Height as of this encounter: 1.981 m (6' 6\").    Weight as of this encounter: 152.2 kg (335 lb 9.6 oz). Body surface area is 2.89 meters squared.  Mild Pain (3) Comment: Data Unavailable   No LMP for male patient.  Allergies reviewed: Yes  Medications reviewed: Yes    Medications: MEDICATION REFILLS NEEDED TODAY. Provider was notified. Patient needs a refill on Amitriptyline.     Pharmacy name entered into For[employer]:    [id number] - [place] - [address] [place] [address] AT INTERSECTION [address] [place] [address] - [address] [facility] [phone]  [id number] - [place], [place] - [address] [facility]  [id number] - [place], [place] - [address]  [address]    Clinical concerns: No new concerns today  Dr. Dee was notified.      Suha Jiménez              "

## 2019-05-28 ENCOUNTER — TELEPHONE (OUTPATIENT)
Dept: RHEUMATOLOGY | Facility: CLINIC | Age: 59
End: 2019-05-28

## 2019-05-28 NOTE — TELEPHONE ENCOUNTER
"Dr Dugan received a message from Dr Dee, who is asking for an urgent appointment. Per Dr Dee' staff message to Dr Dugan.    \"patient with grade 3 musculoskeletal toxicity from immunotherapy.  He has advanced esophageal cancer.  This therapy has been holding him stead for the last 7 months (which is unheard from in esophgeal cancer).  I'm wondering if he can see someone in rheum.  We are being told the soonest he can get an appt is August.     Based on the severity of his toxicity, our guidelines suggest he should see rheum.     He did respond to steroids 1 mg/kg and his symptoms have improved.  I think these will reoccur though given his need to be on treatment.\"    Dr Singer is agreeable to see this pt tomorrow, 5/29/19 at 1:30 pm. Called pt and offered this appointment but he was hoping to get in on a different day. He was planning on meeting his sister at his mother's home. Told pt that we did not have anything in the time frame the provider wanted. He is agree to the appointment tomorrow at 1:30 pm. I did look to see if there were any other appointments within the next week ane there were not. I apologized to pt for the inconvenience but we did not have any appointments before August or September. Pt verbalized understanding and said it is important for him to see Rheumatology.    Location of clinic provided to pt, at which he said he understood.    JOAQUIN CastilloN RN  Rheumatology RN Coordinator  [facility]        "

## 2019-05-28 NOTE — TELEPHONE ENCOUNTER
Notification of referral has been received in the Rheumatology clinic for arthritis on 5/20/2019. Our referral process is as follows:     The Rheumatology Clinic will follow up with the patient in 2-3 weeks to start the intake process by completing an intake form and discuss their medical history with them over the phone. In some cases, a Rheumatologist here at [facility] may not be the right doctor to for the patient. Alternative options will be suggested if that is the case.   Miranda Horn CMA  5/28/2019 11:12 AM

## 2019-05-29 ENCOUNTER — TELEPHONE (OUTPATIENT)
Dept: RHEUMATOLOGY | Facility: CLINIC | Age: 59
End: 2019-05-29

## 2019-05-29 ENCOUNTER — OFFICE VISIT (OUTPATIENT)
Dept: RHEUMATOLOGY | Facility: CLINIC | Age: 59
End: 2019-05-29
Attending: INTERNAL MEDICINE
Payer: COMMERCIAL

## 2019-05-29 VITALS
DIASTOLIC BLOOD PRESSURE: 80 MMHG | WEIGHT: 315 LBS | HEART RATE: 83 BPM | SYSTOLIC BLOOD PRESSURE: 121 MMHG | HEIGHT: 78 IN | OXYGEN SATURATION: 96 % | BODY MASS INDEX: 36.45 KG/M2

## 2019-05-29 DIAGNOSIS — M19.90 INFLAMMATORY ARTHRITIS: ICD-10-CM

## 2019-05-29 DIAGNOSIS — M06.00 SERONEGATIVE RHEUMATOID ARTHRITIS (H): Primary | ICD-10-CM

## 2019-05-29 DIAGNOSIS — M19.90 ARTHRITIS: ICD-10-CM

## 2019-05-29 DIAGNOSIS — M06.00 SERONEGATIVE RHEUMATOID ARTHRITIS (H): ICD-10-CM

## 2019-05-29 DIAGNOSIS — C15.5 CANCER OF DISTAL THIRD OF ESOPHAGUS (H): ICD-10-CM

## 2019-05-29 LAB
CRP SERPL-MCNC: 4.8 MG/L (ref 0–8)
ERYTHROCYTE [SEDIMENTATION RATE] IN BLOOD BY WESTERGREN METHOD: 14 MM/H (ref 0–20)

## 2019-05-29 PROCEDURE — 85613 RUSSELL VIPER VENOM DILUTED: CPT | Performed by: INTERNAL MEDICINE

## 2019-05-29 PROCEDURE — 86146 BETA-2 GLYCOPROTEIN ANTIBODY: CPT | Performed by: INTERNAL MEDICINE

## 2019-05-29 PROCEDURE — 86235 NUCLEAR ANTIGEN ANTIBODY: CPT | Performed by: INTERNAL MEDICINE

## 2019-05-29 PROCEDURE — 85730 THROMBOPLASTIN TIME PARTIAL: CPT | Performed by: INTERNAL MEDICINE

## 2019-05-29 PROCEDURE — 86140 C-REACTIVE PROTEIN: CPT | Performed by: INTERNAL MEDICINE

## 2019-05-29 PROCEDURE — 00000167 ZZHCL STATISTIC INR NC: Performed by: INTERNAL MEDICINE

## 2019-05-29 PROCEDURE — 00000401 ZZHCL STATISTIC THROMBIN TIME NC: Performed by: INTERNAL MEDICINE

## 2019-05-29 PROCEDURE — 86147 CARDIOLIPIN ANTIBODY EA IG: CPT | Performed by: INTERNAL MEDICINE

## 2019-05-29 PROCEDURE — G0463 HOSPITAL OUTPT CLINIC VISIT: HCPCS | Mod: ZF

## 2019-05-29 PROCEDURE — 85652 RBC SED RATE AUTOMATED: CPT | Performed by: INTERNAL MEDICINE

## 2019-05-29 PROCEDURE — 86039 ANTINUCLEAR ANTIBODIES (ANA): CPT | Performed by: INTERNAL MEDICINE

## 2019-05-29 PROCEDURE — 86038 ANTINUCLEAR ANTIBODIES: CPT | Performed by: INTERNAL MEDICINE

## 2019-05-29 PROCEDURE — 86431 RHEUMATOID FACTOR QUANT: CPT | Performed by: INTERNAL MEDICINE

## 2019-05-29 PROCEDURE — 86225 DNA ANTIBODY NATIVE: CPT | Performed by: INTERNAL MEDICINE

## 2019-05-29 PROCEDURE — 25000128 H RX IP 250 OP 636: Performed by: INTERNAL MEDICINE

## 2019-05-29 PROCEDURE — 86200 CCP ANTIBODY: CPT | Performed by: INTERNAL MEDICINE

## 2019-05-29 PROCEDURE — 86481 TB AG RESPONSE T-CELL SUSP: CPT | Performed by: INTERNAL MEDICINE

## 2019-05-29 RX ORDER — PREDNISONE 20 MG/1
20 TABLET ORAL DAILY
Qty: 30 TABLET | Refills: 1 | Status: SHIPPED | OUTPATIENT
Start: 2019-05-29 | End: 2019-07-22

## 2019-05-29 RX ORDER — HEPARIN SODIUM (PORCINE) LOCK FLUSH IV SOLN 100 UNIT/ML 100 UNIT/ML
5 SOLUTION INTRAVENOUS ONCE
Status: COMPLETED | OUTPATIENT
Start: 2019-05-29 | End: 2019-05-29

## 2019-05-29 RX ADMIN — HEPARIN 5 ML: 100 SYRINGE at 15:47

## 2019-05-29 ASSESSMENT — MIFFLIN-ST. JEOR: SCORE: 2470.08

## 2019-05-29 ASSESSMENT — PAIN SCALES - GENERAL: PAINLEVEL: MILD PAIN (3)

## 2019-05-29 NOTE — LETTER
5/29/2019       RE: Reuben Padilla  [address] [facility] [address]     Dear Colleague,    Thank you for referring your patient, Reuben Padilla, to the [facility] RHEUMATOLOGY at [facility]. Please see a copy of my visit note below.    Rheumatology Consult Note    Reason for referral: Inflammatory arthritis sec check point inhibitor immunotherapy    Referring physician: Kadi Dee       HPI:    Reuben Padilla is a 58 year old  male who was referred to our clinic for evaluation and management of his check point inhibitor induced inflammatory arthritis.    Got diagnosed with metastatic esophageal cancer (mets in the liver) in 2017. He was put on one of check point inhibitors Keytruda (PD1 overexpressor) in 10/2018. Developed grade 3 musculoskeletal toxicity from immunotherapy. In April 2019, his infusion was held for three weeks, and he was treated with prednisone for grade 3 arthralgias. It involved hands, R shoulder and R arm pain.  Had pain, stiffness and poor , no joint swelling. He came off the drug, was treated with prednsione 80 mg max every day taper which helped. He is off prednisone now and back to keytruda (s/p one infusion) as was very effective for the cancer. He already started to notice recurrence of arthralgia and is afraid that it gets worse over course of TX which is essential to keep his cancer under excellent control. He gets the drug every 3 weeks.    Today, has h/o shoulder ache, but no hand arthritis.    No personal hx of psoraisis or fh/o psoriasis. There is fh/o OA. No fh/o RA, autoimmune disorders. Has a 26 yo son. 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      Has neuropathy in his legs from mid shin to ankles like numbness.      Has dry mouth during sleep. Gained weight on prednisone.       ROS:  A comprehensive ROS was done, positives are per HPI.        Past Medical History:   Diagnosis Date     Arrhythmia       Cancer (H)     esophageal     Glaucoma      Hypertension      Paroxysmal A-fib (H)      Tubular adenoma 2017     Past Surgical History:   Procedure Laterality Date     AMPUTATION      toe     ARTHROSCOPY KNEE       bunion surgery       CHOLECYSTECTOMY       COLONOSCOPY  2017    remove 2 polyps     ESOPHAGOSCOPY, GASTROSCOPY, DUODENOSCOPY (EGD), COMBINED N/A 10/10/2018    Procedure: COMBINED ESOPHAGOSCOPY, GASTROSCOPY, DUODENOSCOPY (EGD);  Esophagogastroduodenoscopy ;  Surgeon: Leonel Joel MD;  Location: [facility] OR     INSERT PORT VASCULAR ACCESS Right 2017    Procedure: INSERT PORT VASCULAR ACCESS;  Single Lumen Chest Power Port;  Surgeon: Jasiel Hu PA-C;  Location: [facility] OR     No family history on file.  Social History     Socioeconomic History     Marital status: Single     Spouse name: Not on file     Number of children: Not on file     Years of education: Not on file     Highest education level: Not on file   Occupational History     Not on file   Social Needs     Financial resource strain: Not on file     Food insecurity:     Worry: Not on file     Inability: Not on file     Transportation needs:     Medical: Not on file     Non-medical: Not on file   Tobacco Use     Smoking status: Former Smoker     Packs/day: 1.00     Years: 20.00     Pack years: 20.00     Types: Cigarettes     Last attempt to quit: 2006     Years since quittin.4     Smokeless tobacco: Never Used   Substance and Sexual Activity     Alcohol use: Yes     Comment: occasional     Drug use: No     Comment: h/o over 30 years ago     Sexual activity: Not on file   Lifestyle     Physical activity:     Days per week: Not on file     Minutes per session: Not on file     Stress: Not on file   Relationships     Social connections:     Talks on phone: Not on file     Gets together: Not on file     Attends Hoahaoism service: Not on file     Active member of club or organization: Not on file     Attends meetings of  clubs or organizations: Not on file     Relationship status: Not on file     Intimate partner violence:     Fear of current or ex partner: Not on file     Emotionally abused: Not on file     Physically abused: Not on file     Forced sexual activity: Not on file   Other Topics Concern     Parent/sibling w/ CABG, MI or angioplasty before 65F 55M? Not Asked   Social History Narrative     Not on file     Patient Active Problem List   Diagnosis     Cancer of distal third of esophagus (H)     Acute pulmonary embolism (H)     Morbid obesity (H)     Tear of right supraspinatus tendon     Examination of participant in clinical trial     Esophageal obstruction     Paroxysmal A-fib (H)     Orthostatic hypotension     Chemotherapy-induced neutropenia (H)     Malnutrition (H)     G tube feedings (H)     Allergies   Allergen Reactions     Penicillin G Other (See Comments)     Pt not sure-     Penicillins Unknown       Outpatient Encounter Medications as of 5/29/2019   Medication Sig Dispense Refill     amitriptyline (ELAVIL) 10 MG tablet Take 1 tablet (10 mg) by mouth At Bedtime 30 tablet 3     diphenoxylate-atropine (LOMOTIL) 2.5-0.025 MG tablet Take 1-2 tablets by mouth 4 times daily as needed for diarrhea (Max of 8 pills in 24 hours) 100 tablet 1     enoxaparin (LOVENOX) 100 MG/ML syringe Inject 1 mL (100 mg) Subcutaneous every 12 hours 60 Syringe 3     gabapentin (NEURONTIN) 300 MG capsule 2 tablets in the am and 3 tablets before bed 150 capsule 5     latanoprost (XALATAN) 0.005 % ophthalmic solution Place 1 drop into both eyes At Bedtime 1 Bottle 0     lidocaine-prilocaine (EMLA) cream Apply topically as needed for moderate pain 30 g 3     multivitamin, therapeutic with minerals (MULTI-VITAMIN) TABS tablet Take 1 tablet by mouth daily       oxyCODONE IR (ROXICODONE) 10 MG tablet Take 1 tablet (10 mg) by mouth every 4 hours as needed for breakthrough pain 60 tablet 0     timolol (TIMOPTIC) 0.5 % ophthalmic solution Place into  both eyes daily       acetaminophen (TYLENOL) 325 MG tablet Take 3 tablets (975 mg) by mouth every 8 hours as needed for mild pain (Patient not taking: Reported on 5/20/2019) 100 tablet      LORazepam (ATIVAN) 0.5 MG tablet Take 1 tablet (0.5 mg) by mouth every 4 hours as needed (Anxiety, Nausea/Vomiting or Sleep) (Patient not taking: Reported on 5/20/2019) 30 tablet 2     Facility-Administered Encounter Medications as of 5/29/2019   Medication Dose Route Frequency Provider Last Rate Last Dose     lidocaine (PF) (XYLOCAINE) 1 % injection 4 mL  4 mL   Willie Wood, DO   4 mL at 03/19/19 0855     sod bicarbonate-citric acid-simethicone (EZ GAS) 2.21-1.53-0.04 g packet 4 g  4 g Oral Once Justin Yao MD         triamcinolone (KENALOG-40) injection 40 mg  40 mg   Willie Wood, DO   40 mg at 03/19/19 0855               His records were reviewed.    Results for orders placed or performed in visit on 05/20/19   Comprehensive metabolic panel   Result Value Ref Range    Sodium 141 133 - 144 mmol/L    Potassium 4.2 3.4 - 5.3 mmol/L    Chloride 109 94 - 109 mmol/L    Carbon Dioxide 24 20 - 32 mmol/L    Anion Gap 7 3 - 14 mmol/L    Glucose 106 (H) 70 - 99 mg/dL    Urea Nitrogen 25 7 - 30 mg/dL    Creatinine 0.91 0.66 - 1.25 mg/dL    GFR Estimate >90 >60 mL/min/[1.73_m2]    GFR Estimate If Black >90 >60 mL/min/[1.73_m2]    Calcium 9.2 8.5 - 10.1 mg/dL    Bilirubin Total 0.9 0.2 - 1.3 mg/dL    Albumin 3.8 3.4 - 5.0 g/dL    Protein Total 7.3 6.8 - 8.8 g/dL    Alkaline Phosphatase 119 40 - 150 U/L    ALT 26 0 - 70 U/L    AST 16 0 - 45 U/L   TSH with free T4 reflex   Result Value Ref Range    TSH 3.75 0.40 - 4.00 mU/L   CBC with platelets differential   Result Value Ref Range    WBC 5.8 4.0 - 11.0 10e9/L    RBC Count 4.52 4.4 - 5.9 10e12/L    Hemoglobin 13.7 13.3 - 17.7 g/dL    Hematocrit 41.0 40.0 - 53.0 %    MCV 91 78 - 100 fl    MCH 30.3 26.5 - 33.0 pg    MCHC 33.4 31.5 - 36.5 g/dL    RDW 14.8 10.0 - 15.0 %     "Platelet Count 178 150 - 450 10e9/L    Diff Method Automated Method     % Neutrophils 63.1 %    % Lymphocytes 27.7 %    % Monocytes 6.7 %    % Eosinophils 0.7 %    % Basophils 0.9 %    % Immature Granulocytes 0.9 %    Nucleated RBCs 0 0 /100    Absolute Neutrophil 3.7 1.6 - 8.3 10e9/L    Absolute Lymphocytes 1.6 0.8 - 5.3 10e9/L    Absolute Monocytes 0.4 0.0 - 1.3 10e9/L    Absolute Eosinophils 0.0 0.0 - 0.7 10e9/L    Absolute Basophils 0.1 0.0 - 0.2 10e9/L    Abs Immature Granulocytes 0.1 0 - 0.4 10e9/L    Absolute Nucleated RBC 0.0    Hepatitis C antibody   Result Value Ref Range    Hepatitis C Antibody Nonreactive NR^Nonreactive   Hepatitis B surface antigen   Result Value Ref Range    Hep B Surface Agn Nonreactive NR^Nonreactive   Hepatitis B Surface Antibody   Result Value Ref Range    Hepatitis B Surface Antibody 0.14 <8.00 m[IU]/mL               Ph.E:    /80   Pulse 83   Ht 1.981 m (6' 6\")   Wt (!) 151.7 kg (334 lb 6.4 oz)   SpO2 96%   BMI 38.64 kg/m         Constitutional: WD/WN. Pleasant. In no acute distress.  Eyes: EOM intact, PERRLA, sclera anicteric, conj not injected  HEENT: No oral ulcers or thrush. Normal salivary pool.  Neck: No cervical LAP or thyromegaly  Chest: Clear to auscultation bilaterally  CV: RRR, no murmurs/ rubs or gallops. No edema, clubbing or cyanosis.   GI: Abdomen is soft and non tender.   MS: No synovitis. Cool joints. No tenderness of the joints. No  joint deformities. Full ROM of the joints. No nodules. No Jaccoud's deformity. Painful ROM of R shoulder.  Skin: No skin rash, malar rash, livedo, periungual erythema, alopecia, digital ulcers or nail changes.  Neuro: A&O x 3. Grossly non focal, muscular power 5/5 in all ext  Psych: NL affect and mood    Assessment/ plan:    Inflammatory arthritis included by check point inhibitor Tx. Discussed Dx and Tx.    Today's plan:    Labs today    Start enbrel 50 mg weekly if ok with oncology, will discuss with Dr. Dee to see if " it's ok to start the enbrel. Risks were discussed.    Return on 6/26 at 12:30 pm add on    Orders Placed This Encounter   Procedures     Erythrocyte sedimentation rate auto     CRP inflammation     DNA double stranded antibodies     Cyclic Citrullinated Peptide Antibody IgG     Rheumatoid factor     KELTON Panel (RNP, SMITH, Scleroderma, SSAB, SSBB); KELTON, Antibodies, Panel     Anti Nuclear Ailyn IgG by IFA with Reflex     Lupus Anticoagulant Panel     Cardiolipin Ailyn IgG and IgM     Beta 2 Glycoprotein Antibodies IGG IGM     Cardiolipin Antibody IgA     Beta 2 Glycoprotein 1 Antibody IgA       Again, thank you for allowing me to participate in the care of your patient.      Sincerely,    Michelle Singer MD

## 2019-05-29 NOTE — NURSING NOTE
"Chief Complaint   Patient presents with     Port Draw     labs drawn via port by rn.     Port accessed by RN with 20 G 3/4\" gripper needle, labs drawn from port in lab. Flushed with saline and heparin. Port de-accessed.    Sweetie Robledo RN     "

## 2019-05-29 NOTE — PROGRESS NOTES
Rheumatology Consult Note    Reason for referral: Inflammatory arthritis sec check point inhibitor immunotherapy    Referring physician: Kadi Dee       HPI:    Reuben Padilla is a 58 year old  male who was referred to our clinic for evaluation and management of his check point inhibitor induced inflammatory arthritis.    Got diagnosed with metastatic esophageal cancer (mets in the liver) in 2017. He was put on one of check point inhibitors Keytruda (PD1 overexpressor) in 10/2018. Developed grade 3 musculoskeletal toxicity from immunotherapy. In April 2019, his infusion was held for three weeks, and he was treated with prednisone for grade 3 arthralgias. It involved hands, R shoulder and R arm pain.  Had pain, stiffness and poor , no joint swelling. He came off the drug, was treated with prednsione 80 mg max every day taper which helped. He is off prednisone now and back to keytruda (s/p one infusion) as was very effective for the cancer. He already started to notice recurrence of arthralgia and is afraid that it gets worse over course of TX which is essential to keep his cancer under excellent control. He gets the drug every 3 weeks.    Today, has h/o shoulder ache, but no hand arthritis.    No personal hx of psoraisis or fh/o psoriasis. There is fh/o OA. No fh/o RA, autoimmune disorders. Has a 26 yo son. 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      Has neuropathy in his legs from mid shin to ankles like numbness.      Has dry mouth during sleep. Gained weight on prednisone.       ROS:  A comprehensive ROS was done, positives are per HPI.        Past Medical History:   Diagnosis Date     Arrhythmia      Cancer (H)     esophageal     Glaucoma      Hypertension      Paroxysmal A-fib (H)      Tubular adenoma 02/2017     Past Surgical History:   Procedure Laterality Date     AMPUTATION      toe     ARTHROSCOPY KNEE       bunion surgery       CHOLECYSTECTOMY       COLONOSCOPY  02/2017     remove 2 polyps     ESOPHAGOSCOPY, GASTROSCOPY, DUODENOSCOPY (EGD), COMBINED N/A 10/10/2018    Procedure: COMBINED ESOPHAGOSCOPY, GASTROSCOPY, DUODENOSCOPY (EGD);  Esophagogastroduodenoscopy ;  Surgeon: Leonel Joel MD;  Location: [facility] OR     INSERT PORT VASCULAR ACCESS Right 2017    Procedure: INSERT PORT VASCULAR ACCESS;  Single Lumen Chest Power Port;  Surgeon: Jasiel Hu PA-C;  Location: [facility] OR     No family history on file.  Social History     Socioeconomic History     Marital status: Single     Spouse name: Not on file     Number of children: Not on file     Years of education: Not on file     Highest education level: Not on file   Occupational History     Not on file   Social Needs     Financial resource strain: Not on file     Food insecurity:     Worry: Not on file     Inability: Not on file     Transportation needs:     Medical: Not on file     Non-medical: Not on file   Tobacco Use     Smoking status: Former Smoker     Packs/day: 1.00     Years: 20.00     Pack years: 20.00     Types: Cigarettes     Last attempt to quit: 2006     Years since quittin.4     Smokeless tobacco: Never Used   Substance and Sexual Activity     Alcohol use: Yes     Comment: occasional     Drug use: No     Comment: h/o over 30 years ago     Sexual activity: Not on file   Lifestyle     Physical activity:     Days per week: Not on file     Minutes per session: Not on file     Stress: Not on file   Relationships     Social connections:     Talks on phone: Not on file     Gets together: Not on file     Attends Jewish service: Not on file     Active member of club or organization: Not on file     Attends meetings of clubs or organizations: Not on file     Relationship status: Not on file     Intimate partner violence:     Fear of current or ex partner: Not on file     Emotionally abused: Not on file     Physically abused: Not on file     Forced sexual activity: Not on file   Other Topics Concern      Parent/sibling w/ CABG, MI or angioplasty before 65F 55M? Not Asked   Social History Narrative     Not on file     Patient Active Problem List   Diagnosis     Cancer of distal third of esophagus (H)     Acute pulmonary embolism (H)     Morbid obesity (H)     Tear of right supraspinatus tendon     Examination of participant in clinical trial     Esophageal obstruction     Paroxysmal A-fib (H)     Orthostatic hypotension     Chemotherapy-induced neutropenia (H)     Malnutrition (H)     G tube feedings (H)     Allergies   Allergen Reactions     Penicillin G Other (See Comments)     Pt not sure-     Penicillins Unknown       Outpatient Encounter Medications as of 5/29/2019   Medication Sig Dispense Refill     amitriptyline (ELAVIL) 10 MG tablet Take 1 tablet (10 mg) by mouth At Bedtime 30 tablet 3     diphenoxylate-atropine (LOMOTIL) 2.5-0.025 MG tablet Take 1-2 tablets by mouth 4 times daily as needed for diarrhea (Max of 8 pills in 24 hours) 100 tablet 1     enoxaparin (LOVENOX) 100 MG/ML syringe Inject 1 mL (100 mg) Subcutaneous every 12 hours 60 Syringe 3     gabapentin (NEURONTIN) 300 MG capsule 2 tablets in the am and 3 tablets before bed 150 capsule 5     latanoprost (XALATAN) 0.005 % ophthalmic solution Place 1 drop into both eyes At Bedtime 1 Bottle 0     lidocaine-prilocaine (EMLA) cream Apply topically as needed for moderate pain 30 g 3     multivitamin, therapeutic with minerals (MULTI-VITAMIN) TABS tablet Take 1 tablet by mouth daily       oxyCODONE IR (ROXICODONE) 10 MG tablet Take 1 tablet (10 mg) by mouth every 4 hours as needed for breakthrough pain 60 tablet 0     timolol (TIMOPTIC) 0.5 % ophthalmic solution Place into both eyes daily       acetaminophen (TYLENOL) 325 MG tablet Take 3 tablets (975 mg) by mouth every 8 hours as needed for mild pain (Patient not taking: Reported on 5/20/2019) 100 tablet      LORazepam (ATIVAN) 0.5 MG tablet Take 1 tablet (0.5 mg) by mouth every 4 hours as needed  (Anxiety, Nausea/Vomiting or Sleep) (Patient not taking: Reported on 5/20/2019) 30 tablet 2     Facility-Administered Encounter Medications as of 5/29/2019   Medication Dose Route Frequency Provider Last Rate Last Dose     lidocaine (PF) (XYLOCAINE) 1 % injection 4 mL  4 mL   Willie Wood, DO   4 mL at 03/19/19 0855     sod bicarbonate-citric acid-simethicone (EZ GAS) 2.21-1.53-0.04 g packet 4 g  4 g Oral Once Justin Yao MD         triamcinolone (KENALOG-40) injection 40 mg  40 mg   Willie Wood DO   40 mg at 03/19/19 0855               His records were reviewed.    Results for orders placed or performed in visit on 05/20/19   Comprehensive metabolic panel   Result Value Ref Range    Sodium 141 133 - 144 mmol/L    Potassium 4.2 3.4 - 5.3 mmol/L    Chloride 109 94 - 109 mmol/L    Carbon Dioxide 24 20 - 32 mmol/L    Anion Gap 7 3 - 14 mmol/L    Glucose 106 (H) 70 - 99 mg/dL    Urea Nitrogen 25 7 - 30 mg/dL    Creatinine 0.91 0.66 - 1.25 mg/dL    GFR Estimate >90 >60 mL/min/[1.73_m2]    GFR Estimate If Black >90 >60 mL/min/[1.73_m2]    Calcium 9.2 8.5 - 10.1 mg/dL    Bilirubin Total 0.9 0.2 - 1.3 mg/dL    Albumin 3.8 3.4 - 5.0 g/dL    Protein Total 7.3 6.8 - 8.8 g/dL    Alkaline Phosphatase 119 40 - 150 U/L    ALT 26 0 - 70 U/L    AST 16 0 - 45 U/L   TSH with free T4 reflex   Result Value Ref Range    TSH 3.75 0.40 - 4.00 mU/L   CBC with platelets differential   Result Value Ref Range    WBC 5.8 4.0 - 11.0 10e9/L    RBC Count 4.52 4.4 - 5.9 10e12/L    Hemoglobin 13.7 13.3 - 17.7 g/dL    Hematocrit 41.0 40.0 - 53.0 %    MCV 91 78 - 100 fl    MCH 30.3 26.5 - 33.0 pg    MCHC 33.4 31.5 - 36.5 g/dL    RDW 14.8 10.0 - 15.0 %    Platelet Count 178 150 - 450 10e9/L    Diff Method Automated Method     % Neutrophils 63.1 %    % Lymphocytes 27.7 %    % Monocytes 6.7 %    % Eosinophils 0.7 %    % Basophils 0.9 %    % Immature Granulocytes 0.9 %    Nucleated RBCs 0 0 /100    Absolute Neutrophil 3.7 1.6 - 8.3  "10e9/L    Absolute Lymphocytes 1.6 0.8 - 5.3 10e9/L    Absolute Monocytes 0.4 0.0 - 1.3 10e9/L    Absolute Eosinophils 0.0 0.0 - 0.7 10e9/L    Absolute Basophils 0.1 0.0 - 0.2 10e9/L    Abs Immature Granulocytes 0.1 0 - 0.4 10e9/L    Absolute Nucleated RBC 0.0    Hepatitis C antibody   Result Value Ref Range    Hepatitis C Antibody Nonreactive NR^Nonreactive   Hepatitis B surface antigen   Result Value Ref Range    Hep B Surface Agn Nonreactive NR^Nonreactive   Hepatitis B Surface Antibody   Result Value Ref Range    Hepatitis B Surface Antibody 0.14 <8.00 m[IU]/mL               Ph.E:    /80   Pulse 83   Ht 1.981 m (6' 6\")   Wt (!) 151.7 kg (334 lb 6.4 oz)   SpO2 96%   BMI 38.64 kg/m        Constitutional: WD/WN. Pleasant. In no acute distress.  Eyes: EOM intact, PERRLA, sclera anicteric, conj not injected  HEENT: No oral ulcers or thrush. Normal salivary pool.  Neck: No cervical LAP or thyromegaly  Chest: Clear to auscultation bilaterally  CV: RRR, no murmurs/ rubs or gallops. No edema, clubbing or cyanosis.   GI: Abdomen is soft and non tender.   MS: No synovitis. Cool joints. No tenderness of the joints. No  joint deformities. Full ROM of the joints. No nodules. No Jaccoud's deformity. Painful ROM of R shoulder.  Skin: No skin rash, malar rash, livedo, periungual erythema, alopecia, digital ulcers or nail changes.  Neuro: A&O x 3. Grossly non focal, muscular power 5/5 in all ext  Psych: NL affect and mood    Assessment/ plan:    Inflammatory arthritis included by check point inhibitor Tx. Discussed Dx and Tx.    Today's plan:    Labs today    Start enbrel 50 mg weekly if ok with oncology, will discuss with Dr. Dee to see if it's ok to start the enbrel. Risks were discussed.    Return on 6/26 at 12:30 pm add on    Orders Placed This Encounter   Procedures     Erythrocyte sedimentation rate auto     CRP inflammation     DNA double stranded antibodies     Cyclic Citrullinated Peptide Antibody IgG     " Rheumatoid factor     KELTON Panel (RNP, SMITH, Scleroderma, SSAB, SSBB); KELTON, Antibodies, Panel     Anti Nuclear Ailyn IgG by IFA with Reflex     Lupus Anticoagulant Panel     Cardiolipin Ailyn IgG and IgM     Beta 2 Glycoprotein Antibodies IGG IGM     Cardiolipin Antibody IgA     Beta 2 Glycoprotein 1 Antibody IgA

## 2019-05-29 NOTE — NURSING NOTE
Chief Complaint   Patient presents with     Consult     Arthritis Secondary to christopher Gale MA

## 2019-05-29 NOTE — LETTER
5/29/2019      RE: Reuben Padilla  [address] [facility] [address]       Rheumatology Consult Note    Reason for referral: Inflammatory arthritis sec check point inhibitor immunotherapy    Referring physician: Kadi Dee       HPI:    Reuben Padilla is a 58 year old  male who was referred to our clinic for evaluation and management of his check point inhibitor induced inflammatory arthritis.    Got diagnosed with metastatic esophageal cancer (mets in the liver) in 2017. He was put on one of check point inhibitors Keytruda (PD1 overexpressor) in 10/2018. Developed grade 3 musculoskeletal toxicity from immunotherapy. In April 2019, his infusion was held for three weeks, and he was treated with prednisone for grade 3 arthralgias. It involved hands, R shoulder and R arm pain.  Had pain, stiffness and poor , no joint swelling. He came off the drug, was treated with prednsione 80 mg max every day taper which helped. He is off prednisone now and back to keytruda (s/p one infusion) as was very effective for the cancer. He already started to notice recurrence of arthralgia and is afraid that it gets worse over course of TX which is essential to keep his cancer under excellent control. He gets the drug every 3 weeks.    Today, has h/o shoulder ache, but no hand arthritis.    No personal hx of psoraisis or fh/o psoriasis. There is fh/o OA. No fh/o RA, autoimmune disorders. Has a 24 yo son. 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      Has neuropathy in his legs from mid shin to ankles like numbness.      Has dry mouth during sleep. Gained weight on prednisone.       ROS:  A comprehensive ROS was done, positives are per HPI.        Past Medical History:   Diagnosis Date     Arrhythmia      Cancer (H)     esophageal     Glaucoma      Hypertension      Paroxysmal A-fib (H)      Tubular adenoma 02/2017     Past Surgical History:   Procedure Laterality Date     AMPUTATION      toe      ARTHROSCOPY KNEE       bunion surgery       CHOLECYSTECTOMY       COLONOSCOPY  2017    remove 2 polyps     ESOPHAGOSCOPY, GASTROSCOPY, DUODENOSCOPY (EGD), COMBINED N/A 10/10/2018    Procedure: COMBINED ESOPHAGOSCOPY, GASTROSCOPY, DUODENOSCOPY (EGD);  Esophagogastroduodenoscopy ;  Surgeon: Leonel Joel MD;  Location: [facility] OR     INSERT PORT VASCULAR ACCESS Right 2017    Procedure: INSERT PORT VASCULAR ACCESS;  Single Lumen Chest Power Port;  Surgeon: Jasiel Hu PA-C;  Location: [facility] OR     No family history on file.  Social History     Socioeconomic History     Marital status: Single     Spouse name: Not on file     Number of children: Not on file     Years of education: Not on file     Highest education level: Not on file   Occupational History     Not on file   Social Needs     Financial resource strain: Not on file     Food insecurity:     Worry: Not on file     Inability: Not on file     Transportation needs:     Medical: Not on file     Non-medical: Not on file   Tobacco Use     Smoking status: Former Smoker     Packs/day: 1.00     Years: 20.00     Pack years: 20.00     Types: Cigarettes     Last attempt to quit: 2006     Years since quittin.4     Smokeless tobacco: Never Used   Substance and Sexual Activity     Alcohol use: Yes     Comment: occasional     Drug use: No     Comment: h/o over 30 years ago     Sexual activity: Not on file   Lifestyle     Physical activity:     Days per week: Not on file     Minutes per session: Not on file     Stress: Not on file   Relationships     Social connections:     Talks on phone: Not on file     Gets together: Not on file     Attends Taoist service: Not on file     Active member of club or organization: Not on file     Attends meetings of clubs or organizations: Not on file     Relationship status: Not on file     Intimate partner violence:     Fear of current or ex partner: Not on file     Emotionally abused: Not on file      Physically abused: Not on file     Forced sexual activity: Not on file   Other Topics Concern     Parent/sibling w/ CABG, MI or angioplasty before 65F 55M? Not Asked   Social History Narrative     Not on file     Patient Active Problem List   Diagnosis     Cancer of distal third of esophagus (H)     Acute pulmonary embolism (H)     Morbid obesity (H)     Tear of right supraspinatus tendon     Examination of participant in clinical trial     Esophageal obstruction     Paroxysmal A-fib (H)     Orthostatic hypotension     Chemotherapy-induced neutropenia (H)     Malnutrition (H)     G tube feedings (H)     Allergies   Allergen Reactions     Penicillin G Other (See Comments)     Pt not sure-     Penicillins Unknown       Outpatient Encounter Medications as of 5/29/2019   Medication Sig Dispense Refill     amitriptyline (ELAVIL) 10 MG tablet Take 1 tablet (10 mg) by mouth At Bedtime 30 tablet 3     diphenoxylate-atropine (LOMOTIL) 2.5-0.025 MG tablet Take 1-2 tablets by mouth 4 times daily as needed for diarrhea (Max of 8 pills in 24 hours) 100 tablet 1     enoxaparin (LOVENOX) 100 MG/ML syringe Inject 1 mL (100 mg) Subcutaneous every 12 hours 60 Syringe 3     gabapentin (NEURONTIN) 300 MG capsule 2 tablets in the am and 3 tablets before bed 150 capsule 5     latanoprost (XALATAN) 0.005 % ophthalmic solution Place 1 drop into both eyes At Bedtime 1 Bottle 0     lidocaine-prilocaine (EMLA) cream Apply topically as needed for moderate pain 30 g 3     multivitamin, therapeutic with minerals (MULTI-VITAMIN) TABS tablet Take 1 tablet by mouth daily       oxyCODONE IR (ROXICODONE) 10 MG tablet Take 1 tablet (10 mg) by mouth every 4 hours as needed for breakthrough pain 60 tablet 0     timolol (TIMOPTIC) 0.5 % ophthalmic solution Place into both eyes daily       acetaminophen (TYLENOL) 325 MG tablet Take 3 tablets (975 mg) by mouth every 8 hours as needed for mild pain (Patient not taking: Reported on 5/20/2019) 100 tablet       LORazepam (ATIVAN) 0.5 MG tablet Take 1 tablet (0.5 mg) by mouth every 4 hours as needed (Anxiety, Nausea/Vomiting or Sleep) (Patient not taking: Reported on 5/20/2019) 30 tablet 2     Facility-Administered Encounter Medications as of 5/29/2019   Medication Dose Route Frequency Provider Last Rate Last Dose     lidocaine (PF) (XYLOCAINE) 1 % injection 4 mL  4 mL   Willie Wood, DO   4 mL at 03/19/19 0855     sod bicarbonate-citric acid-simethicone (EZ GAS) 2.21-1.53-0.04 g packet 4 g  4 g Oral Once Justin Yao MD         triamcinolone (KENALOG-40) injection 40 mg  40 mg   Willie Wood DO   40 mg at 03/19/19 0855               His records were reviewed.    Results for orders placed or performed in visit on 05/20/19   Comprehensive metabolic panel   Result Value Ref Range    Sodium 141 133 - 144 mmol/L    Potassium 4.2 3.4 - 5.3 mmol/L    Chloride 109 94 - 109 mmol/L    Carbon Dioxide 24 20 - 32 mmol/L    Anion Gap 7 3 - 14 mmol/L    Glucose 106 (H) 70 - 99 mg/dL    Urea Nitrogen 25 7 - 30 mg/dL    Creatinine 0.91 0.66 - 1.25 mg/dL    GFR Estimate >90 >60 mL/min/[1.73_m2]    GFR Estimate If Black >90 >60 mL/min/[1.73_m2]    Calcium 9.2 8.5 - 10.1 mg/dL    Bilirubin Total 0.9 0.2 - 1.3 mg/dL    Albumin 3.8 3.4 - 5.0 g/dL    Protein Total 7.3 6.8 - 8.8 g/dL    Alkaline Phosphatase 119 40 - 150 U/L    ALT 26 0 - 70 U/L    AST 16 0 - 45 U/L   TSH with free T4 reflex   Result Value Ref Range    TSH 3.75 0.40 - 4.00 mU/L   CBC with platelets differential   Result Value Ref Range    WBC 5.8 4.0 - 11.0 10e9/L    RBC Count 4.52 4.4 - 5.9 10e12/L    Hemoglobin 13.7 13.3 - 17.7 g/dL    Hematocrit 41.0 40.0 - 53.0 %    MCV 91 78 - 100 fl    MCH 30.3 26.5 - 33.0 pg    MCHC 33.4 31.5 - 36.5 g/dL    RDW 14.8 10.0 - 15.0 %    Platelet Count 178 150 - 450 10e9/L    Diff Method Automated Method     % Neutrophils 63.1 %    % Lymphocytes 27.7 %    % Monocytes 6.7 %    % Eosinophils 0.7 %    % Basophils 0.9 %    % Immature  "Granulocytes 0.9 %    Nucleated RBCs 0 0 /100    Absolute Neutrophil 3.7 1.6 - 8.3 10e9/L    Absolute Lymphocytes 1.6 0.8 - 5.3 10e9/L    Absolute Monocytes 0.4 0.0 - 1.3 10e9/L    Absolute Eosinophils 0.0 0.0 - 0.7 10e9/L    Absolute Basophils 0.1 0.0 - 0.2 10e9/L    Abs Immature Granulocytes 0.1 0 - 0.4 10e9/L    Absolute Nucleated RBC 0.0    Hepatitis C antibody   Result Value Ref Range    Hepatitis C Antibody Nonreactive NR^Nonreactive   Hepatitis B surface antigen   Result Value Ref Range    Hep B Surface Agn Nonreactive NR^Nonreactive   Hepatitis B Surface Antibody   Result Value Ref Range    Hepatitis B Surface Antibody 0.14 <8.00 m[IU]/mL               Ph.E:    /80   Pulse 83   Ht 1.981 m (6' 6\")   Wt (!) 151.7 kg (334 lb 6.4 oz)   SpO2 96%   BMI 38.64 kg/m         Constitutional: WD/WN. Pleasant. In no acute distress.  Eyes: EOM intact, PERRLA, sclera anicteric, conj not injected  HEENT: No oral ulcers or thrush. Normal salivary pool.  Neck: No cervical LAP or thyromegaly  Chest: Clear to auscultation bilaterally  CV: RRR, no murmurs/ rubs or gallops. No edema, clubbing or cyanosis.   GI: Abdomen is soft and non tender.   MS: No synovitis. Cool joints. No tenderness of the joints. No  joint deformities. Full ROM of the joints. No nodules. No Jaccoud's deformity. Painful ROM of R shoulder.  Skin: No skin rash, malar rash, livedo, periungual erythema, alopecia, digital ulcers or nail changes.  Neuro: A&O x 3. Grossly non focal, muscular power 5/5 in all ext  Psych: NL affect and mood    Assessment/ plan:    Inflammatory arthritis included by check point inhibitor Tx. Discussed Dx and Tx.    Today's plan:    Labs today    Start enbrel 50 mg weekly if ok with oncology, will discuss with Dr. Dee to see if it's ok to start the enbrel. Risks were discussed.    Return on 6/26 at 12:30 pm add on    Orders Placed This Encounter   Procedures     Erythrocyte sedimentation rate auto     CRP inflammation     " DNA double stranded antibodies     Cyclic Citrullinated Peptide Antibody IgG     Rheumatoid factor     KELTON Panel (RNP, SMITH, Scleroderma, SSAB, SSBB); KELTON, Antibodies, Panel     Anti Nuclear Ailyn IgG by IFA with Reflex     Lupus Anticoagulant Panel     Cardiolipin Ailyn IgG and IgM     Beta 2 Glycoprotein Antibodies IGG IGM     Cardiolipin Antibody IgA     Beta 2 Glycoprotein 1 Antibody IgA             Michelle Singer MD

## 2019-05-29 NOTE — TELEPHONE ENCOUNTER
Discussed with Dr. Singer, she has discussed with Dr. Bravo and they would like pt to start on prednisone at 20 mg a day to see how his joint react to the steroid.  Will discuss how to use Enbrel next week.    Called and spoke with pt, he is fine with starting prednisone 20 mg a day.  Discussed side effects, he has been on before.  [facility]  Have cued up and sent to provider to sign.     Sweetie Hernandez RN  Rheumatology Clinic

## 2019-05-29 NOTE — LETTER
Patient:  Reuben Padilla  :   1960  MRN:     [id number]        Mr.Stephen BORIS Padilla  [address] [facility] [address]        2019    Dear ,    We are writing to inform you of your test results. Positive EDELMIRA could be due to cancer and is non specific. The rest of rheumatologic work up is negative. How is your joint pain? We'll start tapering prednisone down based your joint pain and response to enbrel.      Results for orders placed or performed in visit on 19   Beta 2 Glycoprotein 1 Antibody IgA   Result Value Ref Range    Beta 2 Glycoprotein 1 Antibody IgA 4.0 <7 U/mL   Cardiolipin Antibody IgA   Result Value Ref Range    Cardiolipin Antibody IgA 1.3 0.0 - 19.9 APL U/mL   Beta 2 Glycoprotein Antibodies IGG IGM   Result Value Ref Range    Beta 2 Glycoprotein 1 Antibody IgG 0.8 <7 U/mL    Beta 2 Glycoprotein 1 Antibody IgM <0.9 <7 U/mL   Cardiolipin Ailyn IgG and IgM   Result Value Ref Range    Cardiolipin Antibody IgG <1.6 0.0 - 19.9 GPL-U/mL    Cardiolipin Antibody IgM 0.9 0.0 - 19.9 MPL-U/mL   Lupus Anticoagulant Panel   Result Value Ref Range    Lupus Result Negative NEG^Negative   Anti Nuclear Ailyn IgG by IFA with Reflex   Result Value Ref Range    EDELMIRA interpretation Positive (A) NEG^Negative    EDELMIRA pattern 1 SPECKLED     EDELMIRA titer 1 1:320    KELTON Panel (RNP, SMITH, Scleroderma, SSAB, SSBB); KELTON, Antibodies, Panel   Result Value Ref Range    RNP Antibody IgG 0.3 0.0 - 0.9 AI    Ralph KELTON Antibody IgG <0.2 0.0 - 0.9 AI    SSA (Ro) (KELTON) Antibody, IgG <0.2 0.0 - 0.9 AI    SSB (La) (KELTON) Antibody, IgG <0.2 0.0 - 0.9 AI    Scleroderma Antibody Scl-70 KELTON IgG <0.2 0.0 - 0.9 AI   Rheumatoid factor   Result Value Ref Range    Rheumatoid Factor <20 <20 IU/mL   Cyclic Citrullinated Peptide Antibody IgG   Result Value Ref Range    Cyclic Citrullinated Peptide Antibody, IgG 1 <7 U/mL   DNA double stranded antibodies   Result Value Ref Range    DNA-ds <1 <10 IU/mL   CRP inflammation    Result Value Ref Range    CRP Inflammation 4.8 0.0 - 8.0 mg/L   Erythrocyte sedimentation rate auto   Result Value Ref Range    Sed Rate 14 0 - 20 mm/h   Quantiferon TB Gold Plus   Result Value Ref Range    Quantiferon-TB Gold Plus Result Negative NEG^Negative    TB1 Ag minus Nil Value 0.00 IU/mL    TB2 Ag minus Nil Value 0.00 IU/mL    Mitogen minus Nil Result 9.13 IU/mL    Nil Result 0.03 IU/mL       Michelle Singer MD

## 2019-05-30 LAB
ANA PAT SER IF-IMP: ABNORMAL
ANA SER QL IF: POSITIVE
ANA TITR SER IF: ABNORMAL {TITER}
B2 GLYCOPROT1 IGA SER-ACNC: 4 U/ML
B2 GLYCOPROT1 IGG SERPL IA-ACNC: 0.8 U/ML
B2 GLYCOPROT1 IGM SERPL IA-ACNC: <0.9 U/ML
CARDIOLIPIN ANTIBODY IGG: <1.6 GPL-U/ML (ref 0–19.9)
CARDIOLIPIN ANTIBODY IGM: 0.9 MPL-U/ML (ref 0–19.9)
CARDIOLIPIN IGA SERPL-ACNC: 1.3 APL U/ML (ref 0–19.9)
CCP AB SER IA-ACNC: 1 U/ML
DSDNA AB SER-ACNC: <1 IU/ML
ENA RNP IGG SER IA-ACNC: 0.3 AI (ref 0–0.9)
ENA SCL70 IGG SER IA-ACNC: <0.2 AI (ref 0–0.9)
ENA SM IGG SER-ACNC: <0.2 AI (ref 0–0.9)
ENA SS-A IGG SER IA-ACNC: <0.2 AI (ref 0–0.9)
ENA SS-B IGG SER IA-ACNC: <0.2 AI (ref 0–0.9)
RHEUMATOID FACT SER NEPH-ACNC: <20 IU/ML (ref 0–20)

## 2019-05-31 ENCOUNTER — TELEPHONE (OUTPATIENT)
Dept: RHEUMATOLOGY | Facility: CLINIC | Age: 59
End: 2019-05-31

## 2019-05-31 LAB
GAMMA INTERFERON BACKGROUND BLD IA-ACNC: 0.03 IU/ML
LA PPP-IMP: NEGATIVE
M TB IFN-G BLD-IMP: NEGATIVE
M TB IFN-G CD4+ BCKGRND COR BLD-ACNC: 9.13 IU/ML
MITOGEN IGNF BCKGRD COR BLD-ACNC: 0 IU/ML
MITOGEN IGNF BCKGRD COR BLD-ACNC: 0 IU/ML

## 2019-05-31 NOTE — TELEPHONE ENCOUNTER
PA Initiation    Medication: ENBREL MINI   Insurance Company: [employer] - Phone [phone] Fax [phone]  Pharmacy Filling the Rx: [facility] - [place] [facility]  Filling Pharmacy Phone:    Filling Pharmacy Fax:    Start Date: 5/31/2019

## 2019-06-04 NOTE — TELEPHONE ENCOUNTER
Michelle Singer MD Wadsten, Amy Cc: Sweetie Hernandez RN; Kadi Dee MD   Caller: Unspecified (4 days ago, 10:46 AM)             Good to hear Enbrel got approved. Wait to start till I tell you on Thursday, we have a guest speaker who is going to talk about check point inhibitors tomorrow and I plan to discuss with him. Also, could you plz call to see how he is doing on pred 20 every day?     Thanks     Called and spoke with pt, he started prednisone, states that pain has improved, some slight stiffness in hands is left.  Was not having much pain prior to starting prednisone, does feel that it is helping, as he feels that he was having more stiffness before starting the prednisone.    He is aware that I will call him on Thursday to let him know if he will start Enbrel.    Sweetie Hernandez RN  Rheumatology Clinic

## 2019-06-04 NOTE — TELEPHONE ENCOUNTER
Prior Authorization Approval    Authorization Effective Date: 6/4/2019  Authorization Expiration Date: 6/4/2021  Medication: ENBREL MINI - Approved  Approved Dose/Quantity:  4 FOR 28 DAYS  Reference #: [id number]   Insurance Company: [employer] - Phone [phone] Fax [phone]  Expected CoPay:     Yes - patient mycharted with instructions   CoPay Card Available:      Foundation Assistance Needed:    Which Pharmacy is filling the prescription (Not needed for infusion/clinic administered): [facility] - [facility]  Pharmacy Notified: Yes - rx released  Patient Notified: Yes - via mychart

## 2019-06-06 ENCOUNTER — MYC MEDICAL ADVICE (OUTPATIENT)
Dept: RHEUMATOLOGY | Facility: CLINIC | Age: 59
End: 2019-06-06

## 2019-06-06 DIAGNOSIS — M06.00 SERONEGATIVE RHEUMATOID ARTHRITIS (H): Primary | ICD-10-CM

## 2019-06-11 ENCOUNTER — APPOINTMENT (OUTPATIENT)
Dept: LAB | Facility: CLINIC | Age: 59
End: 2019-06-11
Attending: INTERNAL MEDICINE
Payer: COMMERCIAL

## 2019-06-11 ENCOUNTER — ONCOLOGY VISIT (OUTPATIENT)
Dept: ONCOLOGY | Facility: CLINIC | Age: 59
End: 2019-06-11
Attending: INTERNAL MEDICINE
Payer: COMMERCIAL

## 2019-06-11 ENCOUNTER — TELEPHONE (OUTPATIENT)
Dept: RHEUMATOLOGY | Facility: CLINIC | Age: 59
End: 2019-06-11

## 2019-06-11 VITALS
RESPIRATION RATE: 16 BRPM | WEIGHT: 315 LBS | HEART RATE: 87 BPM | SYSTOLIC BLOOD PRESSURE: 117 MMHG | BODY MASS INDEX: 38.9 KG/M2 | DIASTOLIC BLOOD PRESSURE: 90 MMHG | TEMPERATURE: 97.9 F | OXYGEN SATURATION: 98 %

## 2019-06-11 DIAGNOSIS — D70.1 CHEMOTHERAPY-INDUCED NEUTROPENIA (H): ICD-10-CM

## 2019-06-11 DIAGNOSIS — C15.5 CANCER OF DISTAL THIRD OF ESOPHAGUS (H): ICD-10-CM

## 2019-06-11 DIAGNOSIS — C15.5 CANCER OF DISTAL THIRD OF ESOPHAGUS (H): Primary | ICD-10-CM

## 2019-06-11 DIAGNOSIS — T45.1X5A CHEMOTHERAPY-INDUCED NEUTROPENIA (H): ICD-10-CM

## 2019-06-11 DIAGNOSIS — M06.00 SERONEGATIVE RHEUMATOID ARTHRITIS (H): Primary | ICD-10-CM

## 2019-06-11 LAB
ALBUMIN SERPL-MCNC: 3.7 G/DL (ref 3.4–5)
ALP SERPL-CCNC: 78 U/L (ref 40–150)
ALT SERPL W P-5'-P-CCNC: 20 U/L (ref 0–70)
ANION GAP SERPL CALCULATED.3IONS-SCNC: 6 MMOL/L (ref 3–14)
AST SERPL W P-5'-P-CCNC: 8 U/L (ref 0–45)
BASOPHILS # BLD AUTO: 0 10E9/L (ref 0–0.2)
BASOPHILS NFR BLD AUTO: 0.3 %
BILIRUB SERPL-MCNC: 0.6 MG/DL (ref 0.2–1.3)
BUN SERPL-MCNC: 19 MG/DL (ref 7–30)
CALCIUM SERPL-MCNC: 8.5 MG/DL (ref 8.5–10.1)
CHLORIDE SERPL-SCNC: 114 MMOL/L (ref 94–109)
CO2 SERPL-SCNC: 25 MMOL/L (ref 20–32)
CREAT SERPL-MCNC: 0.79 MG/DL (ref 0.66–1.25)
DIFFERENTIAL METHOD BLD: ABNORMAL
EOSINOPHIL # BLD AUTO: 0 10E9/L (ref 0–0.7)
EOSINOPHIL NFR BLD AUTO: 0.6 %
ERYTHROCYTE [DISTWIDTH] IN BLOOD BY AUTOMATED COUNT: 14.6 % (ref 10–15)
GFR SERPL CREATININE-BSD FRML MDRD: >90 ML/MIN/{1.73_M2}
GLUCOSE SERPL-MCNC: 105 MG/DL (ref 70–99)
HCT VFR BLD AUTO: 41.3 % (ref 40–53)
HGB BLD-MCNC: 13.6 G/DL (ref 13.3–17.7)
IMM GRANULOCYTES # BLD: 0.1 10E9/L (ref 0–0.4)
IMM GRANULOCYTES NFR BLD: 0.9 %
LYMPHOCYTES # BLD AUTO: 1.7 10E9/L (ref 0.8–5.3)
LYMPHOCYTES NFR BLD AUTO: 25.8 %
MCH RBC QN AUTO: 30.8 PG (ref 26.5–33)
MCHC RBC AUTO-ENTMCNC: 32.9 G/DL (ref 31.5–36.5)
MCV RBC AUTO: 93 FL (ref 78–100)
MONOCYTES # BLD AUTO: 0.4 10E9/L (ref 0–1.3)
MONOCYTES NFR BLD AUTO: 5.4 %
NEUTROPHILS # BLD AUTO: 4.4 10E9/L (ref 1.6–8.3)
NEUTROPHILS NFR BLD AUTO: 67 %
NRBC # BLD AUTO: 0 10*3/UL
NRBC BLD AUTO-RTO: 0 /100
PLATELET # BLD AUTO: 145 10E9/L (ref 150–450)
POTASSIUM SERPL-SCNC: 3.7 MMOL/L (ref 3.4–5.3)
PROT SERPL-MCNC: 6.6 G/DL (ref 6.8–8.8)
RBC # BLD AUTO: 4.42 10E12/L (ref 4.4–5.9)
SODIUM SERPL-SCNC: 145 MMOL/L (ref 133–144)
TSH SERPL DL<=0.005 MIU/L-ACNC: 2.28 MU/L (ref 0.4–4)
WBC # BLD AUTO: 6.6 10E9/L (ref 4–11)

## 2019-06-11 PROCEDURE — 99214 OFFICE O/P EST MOD 30 MIN: CPT | Mod: ZP | Performed by: PHYSICIAN ASSISTANT

## 2019-06-11 PROCEDURE — 80053 COMPREHEN METABOLIC PANEL: CPT | Performed by: INTERNAL MEDICINE

## 2019-06-11 PROCEDURE — 25000128 H RX IP 250 OP 636: Mod: ZF | Performed by: PHYSICIAN ASSISTANT

## 2019-06-11 PROCEDURE — 96413 CHEMO IV INFUSION 1 HR: CPT

## 2019-06-11 PROCEDURE — 96411 CHEMO IV PUSH ADDL DRUG: CPT

## 2019-06-11 PROCEDURE — 85025 COMPLETE CBC W/AUTO DIFF WBC: CPT | Performed by: INTERNAL MEDICINE

## 2019-06-11 PROCEDURE — 84443 ASSAY THYROID STIM HORMONE: CPT | Performed by: INTERNAL MEDICINE

## 2019-06-11 PROCEDURE — G0463 HOSPITAL OUTPT CLINIC VISIT: HCPCS | Mod: ZF

## 2019-06-11 PROCEDURE — 25800030 ZZH RX IP 258 OP 636: Mod: ZF | Performed by: PHYSICIAN ASSISTANT

## 2019-06-11 RX ORDER — ALBUTEROL SULFATE 90 UG/1
1-2 AEROSOL, METERED RESPIRATORY (INHALATION)
Status: CANCELLED
Start: 2019-06-11

## 2019-06-11 RX ORDER — METHYLPREDNISOLONE SODIUM SUCCINATE 125 MG/2ML
125 INJECTION, POWDER, LYOPHILIZED, FOR SOLUTION INTRAMUSCULAR; INTRAVENOUS
Status: CANCELLED
Start: 2019-06-11

## 2019-06-11 RX ORDER — EPINEPHRINE 1 MG/ML
0.3 INJECTION, SOLUTION INTRAMUSCULAR; SUBCUTANEOUS EVERY 5 MIN PRN
Status: CANCELLED | OUTPATIENT
Start: 2019-06-11

## 2019-06-11 RX ORDER — HEPARIN SODIUM (PORCINE) LOCK FLUSH IV SOLN 100 UNIT/ML 100 UNIT/ML
500 SOLUTION INTRAVENOUS ONCE
Status: CANCELLED | OUTPATIENT
Start: 2019-06-11

## 2019-06-11 RX ORDER — MEPERIDINE HYDROCHLORIDE 25 MG/ML
25 INJECTION INTRAMUSCULAR; INTRAVENOUS; SUBCUTANEOUS EVERY 30 MIN PRN
Status: CANCELLED | OUTPATIENT
Start: 2019-06-11

## 2019-06-11 RX ORDER — EPINEPHRINE 0.3 MG/.3ML
0.3 INJECTION SUBCUTANEOUS EVERY 5 MIN PRN
Status: CANCELLED | OUTPATIENT
Start: 2019-06-11

## 2019-06-11 RX ORDER — DIPHENHYDRAMINE HYDROCHLORIDE 50 MG/ML
50 INJECTION INTRAMUSCULAR; INTRAVENOUS
Status: CANCELLED
Start: 2019-06-11

## 2019-06-11 RX ORDER — HEPARIN SODIUM (PORCINE) LOCK FLUSH IV SOLN 100 UNIT/ML 100 UNIT/ML
5 SOLUTION INTRAVENOUS DAILY PRN
Status: DISCONTINUED | OUTPATIENT
Start: 2019-06-11 | End: 2019-06-11 | Stop reason: HOSPADM

## 2019-06-11 RX ORDER — SODIUM CHLORIDE 9 MG/ML
1000 INJECTION, SOLUTION INTRAVENOUS CONTINUOUS PRN
Status: CANCELLED
Start: 2019-06-11

## 2019-06-11 RX ORDER — ALBUTEROL SULFATE 0.83 MG/ML
2.5 SOLUTION RESPIRATORY (INHALATION)
Status: CANCELLED | OUTPATIENT
Start: 2019-06-11

## 2019-06-11 RX ORDER — HEPARIN SODIUM (PORCINE) LOCK FLUSH IV SOLN 100 UNIT/ML 100 UNIT/ML
500 SOLUTION INTRAVENOUS ONCE
Status: COMPLETED | OUTPATIENT
Start: 2019-06-11 | End: 2019-06-11

## 2019-06-11 RX ORDER — LORAZEPAM 2 MG/ML
0.5 INJECTION INTRAMUSCULAR EVERY 4 HOURS PRN
Status: CANCELLED
Start: 2019-06-11

## 2019-06-11 RX ADMIN — SODIUM CHLORIDE 200 MG: 9 INJECTION, SOLUTION INTRAVENOUS at 09:07

## 2019-06-11 RX ADMIN — HEPARIN 5 ML: 100 SYRINGE at 07:01

## 2019-06-11 RX ADMIN — HEPARIN 500 UNITS: 100 SYRINGE at 09:45

## 2019-06-11 RX ADMIN — SODIUM CHLORIDE 250 ML: 9 INJECTION, SOLUTION INTRAVENOUS at 08:49

## 2019-06-11 ASSESSMENT — PAIN SCALES - GENERAL: PAINLEVEL: NO PAIN (0)

## 2019-06-11 NOTE — TELEPHONE ENCOUNTER
PA Initiation    Medication: ACTEMRA  Insurance Company: [employer] - Phone [phone] Fax [phone]  Pharmacy Filling the Rx: [facility] - [address] [place] [address]  Filling Pharmacy Phone:    Filling Pharmacy Fax:    Start Date: 6/11/2019

## 2019-06-11 NOTE — TELEPHONE ENCOUNTER
Received call, he said that we were supposed to call him yesterday, I apologized but I was not aware of that.  He wants to know where we are at with the Actemra.  I am not sure where we are at with the approval, but I can show him the new pen.      Sweetie Hernandez RN  Rheumatology Clinic

## 2019-06-11 NOTE — PROGRESS NOTES
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 His tumor is positive for cytokeratin 20, negative for P63 and CK7, and HER2 is negative. He started on FOLFOX (5FU/oxaliplatin) on 5/15/2017. He had a delay in treatment from 9/5-10/2/17 due to work related injury.    He had an excellent response by imaging throughout the summer 2017.    He a mixed response by imaging in late fall 2017.    In January 2018, he was switched to taxol and cyramza - had slight progression and neuropathy and clinical trial became available.       In March 2018, he was started on the [facility] Match Clinical Trial with crizotinib.  (MET amplification) He remained on crizotinib from March - July 2018.    August 2018, he was switched back to taxol/cramza.  In October, he was switched to Keytruda (PD1 overexpressor).    In April 2019, his infusion was held for three weeks, and he was treated with prednisone for grade 3 arthralgias.    INTERVAL HISTORY: Levi comes in today for followup. He is now on Keytruda (resumed 5/20).  His last CT showed continued response which is encouraging however off the prednisone, his arthralgias started to come back.  He now is on 20 mg of prednisone daily and they are mild/stable in the hands/shoulders.  Strength is better in the left hand >right.  He feels he is able to do all his ADLs and still is doing daily workouts.       He has had no issues with fevers or chills, no chest pain or shortness of breath, no nausea, vomiting, diarrhea or constipation.  He uses lomotil 3-4 times in the last two weeks.  He continues to alternate with normal stools and occasional diarrhea, seems to be provoked by a big meal.       He has no abdminal pain.  No n/v/d/c.       His energy levels are good.  Mood is good. Sleep is good  on amitriptyline.     No dysphagia.        ROS: 10 point ROS neg other than the symptoms noted above in the HPI.    PHYSICAL EXAMINATION  /90 (BP Location: Right arm, Patient Position: Sitting, Cuff Size: Adult Large)   Pulse 87   Temp 97.9  F (36.6  C) (Oral)   Resp 16   Wt (!) 152.7 kg (336 lb 9.6 oz)   SpO2 98%   BMI 38.90 kg/m     Wt Readings from Last 4 Encounters:   06/11/19 (!) 152.7 kg (336 lb 9.6 oz)   05/29/19 (!) 151.7 kg (334 lb 6.4 oz)   05/20/19 (!) 152.2 kg (335 lb 9.6 oz)   04/08/19 142.4 kg (314 lb)     Constitutional: Alert, oriented male in no visible distress.  Eyes: PERRL. Anicteric sclerae.  ENT/Mouth: OM moist and pink without lesions or thrush.  CV: RRR  Resp: CTAB throughout  Abdomen: Soft, non-tender, non-distended. Obese. Bowel sounds present. Unable to palpate liver or spleen.  No RLQ tenderness.  Extremities: trace edema , no erythema or warmth over joints  Skin: Warm, dry.   Lymph: No cervical or supraclavicular lymphadenopathy appreciated.   Neuro: CN II-XII grossly intact.     MUSC: no erythema or warmth over shoulders, DIPs, PIPs.  Hand strength normal.  Thenar wasting notable in R hand  ECOG PS: 1          6/11/2019 07:07   Sodium 145 (H)   Potassium 3.7   Chloride 114 (H)   Carbon Dioxide 25   Urea Nitrogen 19   Creatinine 0.79   GFR Estimate >90   GFR Estimate If Black >90   Calcium 8.5   Anion Gap 6   Albumin 3.7   Protein Total 6.6 (L)   Bilirubin Total 0.6   Alkaline Phosphatase 78   ALT 20   AST 8   TSH 2.28   Glucose 105 (H)   WBC 6.6   Hemoglobin 13.6   Hematocrit 41.3   Platelet Count 145 (L)   RBC Count 4.42   MCV 93       IMPRESSION/PLAN:  1. Metastatic esophogeal adenocarcinoma. He has had treatment with FOLFOX and then transitioned to Taxol with ramicurimab.  He was then on crizotinib on the [employer] Match study.  He progressed on this and returned on taxol/cyramza. And is now on Keytruda.  Levi has had a great response radiographically to the Keytruda, however  did develop a grade 3 arthralgias in the hands/shoulders.  These responded well to burst of steroids and 6 weeks off of treatment. The arthralgias did reoccur with restarting Keytruda on 5/20 and he now is back on 20 mg of prednisone and seeing rheumatology for likely another agent (see notes). Continue Keytruda today as back to a grade 1 toxicity.  Will plan to repeat imaging in 12 weeks (august).    Will continue close follow up.       Rheum: see prior notes.  Currently on prednisone 20 mg daily, plan to add in another agent.  Will discuss with rheum longevity of this as he would need to be on pjp ppx if he stays on 20 mg daily.     HEME: A history of PE.  He is on Lovenox twice daily.  He has progressed through Xarelto in the past. No bleeding issues.  If his weight continues to climb we will need to recheck a Xa on him.     Neuro: He does have baseline neuropathy and is on gabapentin 600/300/600 mg daily.   This is stable      ID: Appendicitis noted on multiple CT scans - No symptoms of elevated WBC, fever or pain.  On imaging this is stable.  It is likely related to Keytruda.    SLEEP: insomnia related to steroids, amitriptyline helping, not needing ativan or Ambien.      Nicole Sutherland PA-C

## 2019-06-11 NOTE — NURSING NOTE
Chief Complaint   Patient presents with     Port Draw     Labs drawn via port by RN in lab. VS taken.      Labs drawn via Port accessed using 20g gripper needle. Line flushed and Heparin locked. Vital signs taken. Checked into next appointment.       Billie Montalvo RN

## 2019-06-11 NOTE — PATIENT INSTRUCTIONS
[facility] Main Line: [phone]    Call triage nurse with chills and/or temperature greater than or equal to 100.4, uncontrolled nausea/vomiting, diarrhea, constipation, dizziness, shortness of breath, chest pain, bleeding, unexplained bruising, or any new/concerning symptoms, questions/concerns.   If you are having any concerning symptoms or wish to speak to a provider before your next infusion visit, please call your care coordinator or triage to notify them so we can adequately serve you.   Triage Nurse Line: [phone]    If after hours, weekends, or holidays [phone]             June 2019 Sunday Monday Tuesday Wednesday Thursday Friday Saturday                                 1       2     3     4     5     6     7     8       9     10     11    [facility] DRAW   7:00 AM   (15 min.)    [facility] DRAW   [facility] Lab Draw    [facility] RETURN   7:35 AM   (50 min.)   Loraine Sutherland PA-C   [facility]   8:30 AM   (60 min.)   [facility] INFUSION   [facility] 12     13     14     15       16     17     18     19     20     21     22       23     24     25     26    [facility] RETURN  12:15 PM   (30 min.)   Michelle Singer MD   [facility] Rheumatology 27     28     29       30 July 2019 Sunday Monday Tuesday Wednesday Thursday Friday Saturday        1    [facility]RAW  11:15 AM   (15 min.)   [facility]    [facility] RETURN  11:45 AM   (30 min.)   Kadi Dee MD   [facility]    [facility] ONC INFUSION 60   1:00 PM   (60 min.)   [facility] ONCOLOGY INFUSION   [facility] 2     3     4     5     6       7     8     9     10     11     12     13       14     15     16     17     18     19     20       21     22    [facility] DRAW   7:45 AM   (15 min.)   [facility] DRAW   [facility] Lab  Draw    [facility] RETURN   8:15 AM   (50 min.)   Loraine Sutherland PA-C [facility]    [facility] ONC INFUSION 60   9:30 AM   (60 min.)   [facility] ONCOLOGY INFUSION   [facility] 23     24     25     26     27       28     29     30     31                                    Lab Results:  Recent Results (from the past 12 hour(s))   Comprehensive metabolic panel    Collection Time: 06/11/19  7:07 AM   Result Value Ref Range    Sodium 145 (H) 133 - 144 mmol/L    Potassium 3.7 3.4 - 5.3 mmol/L    Chloride 114 (H) 94 - 109 mmol/L    Carbon Dioxide 25 20 - 32 mmol/L    Anion Gap 6 3 - 14 mmol/L    Glucose 105 (H) 70 - 99 mg/dL    Urea Nitrogen 19 7 - 30 mg/dL    Creatinine 0.79 0.66 - 1.25 mg/dL    GFR Estimate >90 >60 mL/min/[1.73_m2]    GFR Estimate If Black >90 >60 mL/min/[1.73_m2]    Calcium 8.5 8.5 - 10.1 mg/dL    Bilirubin Total 0.6 0.2 - 1.3 mg/dL    Albumin 3.7 3.4 - 5.0 g/dL    Protein Total 6.6 (L) 6.8 - 8.8 g/dL    Alkaline Phosphatase 78 40 - 150 U/L    ALT 20 0 - 70 U/L    AST 8 0 - 45 U/L   TSH with free T4 reflex    Collection Time: 06/11/19  7:07 AM   Result Value Ref Range    TSH 2.28 0.40 - 4.00 mU/L   CBC with platelets differential    Collection Time: 06/11/19  7:07 AM   Result Value Ref Range    WBC 6.6 4.0 - 11.0 10e9/L    RBC Count 4.42 4.4 - 5.9 10e12/L    Hemoglobin 13.6 13.3 - 17.7 g/dL    Hematocrit 41.3 40.0 - 53.0 %    MCV 93 78 - 100 fl    MCH 30.8 26.5 - 33.0 pg    MCHC 32.9 31.5 - 36.5 g/dL    RDW 14.6 10.0 - 15.0 %    Platelet Count 145 (L) 150 - 450 10e9/L    Diff Method Automated Method     % Neutrophils 67.0 %    % Lymphocytes 25.8 %    % Monocytes 5.4 %    % Eosinophils 0.6 %    % Basophils 0.3 %    % Immature Granulocytes 0.9 %    Nucleated RBCs 0 0 /100    Absolute Neutrophil 4.4 1.6 - 8.3 10e9/L    Absolute Lymphocytes 1.7 0.8 - 5.3 10e9/L    Absolute Monocytes 0.4 0.0 - 1.3 10e9/L    Absolute Eosinophils 0.0 0.0 - 0.7 10e9/L    Absolute Basophils 0.0 0.0 -  0.2 10e9/L    Abs Immature Granulocytes 0.1 0 - 0.4 10e9/L    Absolute Nucleated RBC 0.0

## 2019-06-11 NOTE — TELEPHONE ENCOUNTER
Discussed Actemra pen with pt, teaching completed.  All questions answered to his satisfaction.    Sweetie Hernandez RN  Rheumatology Clinic

## 2019-06-11 NOTE — NURSING NOTE
"Oncology Rooming Note    June 11, 2019 7:22 AM   Reuben Padilla is a 58 year old male who presents for:    Chief Complaint   Patient presents with     Port Draw     Labs drawn via port by RN in lab. VS taken.      Oncology Clinic Visit     Return visit related to Esophageal Cancer     Initial Vitals: /90 (BP Location: Right arm, Patient Position: Sitting, Cuff Size: Adult Large)   Pulse 87   Temp 97.9  F (36.6  C) (Oral)   Resp 16   Wt (!) 152.7 kg (336 lb 9.6 oz)   SpO2 98%   BMI 38.90 kg/m   Estimated body mass index is 38.9 kg/m  as calculated from the following:    Height as of 5/29/19: 1.981 m (6' 6\").    Weight as of this encounter: 152.7 kg (336 lb 9.6 oz). Body surface area is 2.9 meters squared.  No Pain (0) Comment: Data Unavailable   No LMP for male patient.  Allergies reviewed: Yes  Medications reviewed: Yes    Medications: Medication refills not needed today.  Pharmacy name entered into NPS:    [id number] - [place] - [address] [place] [address] AT INTERSECTION [address] [place] [address] PHARMACY [facility] [place], [place] - [address] [place] [address] [address]  [id number] - [place], [place] - [address] [facility]  [id number] - [place], [place] - [address] [address] [facility] - [place], [place] - [address] [place] [address]    Clinical concerns: No new concerns. Provider was notified.      Kadi Arevalo LPN            "

## 2019-06-11 NOTE — PROGRESS NOTES
Infusion Nursing Note:  Reuben Padilla presents today for C10D1 Keytruda.    Patient seen by provider today: Yes: Loraine Sutherland PA-C   present during visit today: Not Applicable.    Note: Patient reported to clinic today with no new complaints after seeing provider.    Intravenous Access:  Implanted Port.    Treatment Conditions:  Lab Results   Component Value Date    HGB 13.6 06/11/2019     Lab Results   Component Value Date    WBC 6.6 06/11/2019      Lab Results   Component Value Date    ANEU 4.4 06/11/2019     Lab Results   Component Value Date     06/11/2019      Lab Results   Component Value Date     06/11/2019                   Lab Results   Component Value Date    POTASSIUM 3.7 06/11/2019           Lab Results   Component Value Date    MAG 1.9 11/07/2018            Lab Results   Component Value Date    CR 0.79 06/11/2019                   Lab Results   Component Value Date    NIDHI 8.5 06/11/2019                Lab Results   Component Value Date    BILITOTAL 0.6 06/11/2019           Lab Results   Component Value Date    ALBUMIN 3.7 06/11/2019                    Lab Results   Component Value Date    ALT 20 06/11/2019           Lab Results   Component Value Date    AST 8 06/11/2019       Results reviewed, labs MET treatment parameters, ok to proceed with treatment.      Post Infusion Assessment:  Patient tolerated infusion without incident.  Blood return noted pre and post infusion.  Site patent and intact, free from redness, edema or discomfort.  No evidence of extravasations.  Access discontinued per protocol.       Discharge Plan:   Patient declined prescription refills.  Discharge instructions reviewed with: Patient.  Patient and/or family verbalized understanding of discharge instructions and all questions answered.  AVS to patient via [employer].  Patient will return 7/1/19 for next appointment.   Patient discharged in stable condition accompanied by: self.  Departure Mode:  Ambulatory.  Face to Face time: 0 minutes.    Paolo Gilmore RN

## 2019-06-13 NOTE — TELEPHONE ENCOUNTER
PRIOR AUTHORIZATION DENIED    Medication: Actemra 162mg/ 0.9mL pens - DENIED    Denial Date: 6/13/2019    Denial Rationale: Patient has not tried and failed all alternatives (Enbrel, Humira, Kevzara, Orencia, Xeljanz)    Appeal Information: Yes, on letter - via fax or mail to [facility] specialty appeals dept

## 2019-06-17 NOTE — TELEPHONE ENCOUNTER
Medication Appeal Initiation    We have initiated an appeal for the requested medication:  Medication: Actemra 162mg/ 0.9mL pens - Appeal initiated   Appeal Start Date:  6/17/2019  Insurance Company:  [employer]   Comments:

## 2019-06-20 NOTE — TELEPHONE ENCOUNTER
MEDICATION APPEAL DENIED    Medication: Actemra 162mg/ 0.9mL pens - Appeal denied    Denial Date: 6/20/2019    Denial Rational:  See letter    Second Level Appeal Information: See letter    Second level appeals will be managed by the clinic staff and provider. Please contact the [employer] Prior Authorization Team if additional information about the denial is needed.

## 2019-06-21 NOTE — TELEPHONE ENCOUNTER
[facility] Call Center    Phone Message    May a detailed message be left on voicemail: yes    Reason for Call: Medication Question or concern regarding medication   Prescription Clarification  Name of Medication: Actemra  Prescribing Provider: Enmanuel   Pharmacy:    What on the order needs clarification?  Please call him as soon as possible to discuss appeal or any other updated info. About this med.          Action Taken: Message routed to:  [facility] ([facility] Rheum

## 2019-06-24 NOTE — TELEPHONE ENCOUNTER
Michelle Singer MD Beard, Madeline, RN   Caller: Unspecified (Today,  1:55 PM)             Don't need to be seen and could cancel if he is ok with it, I could overbook him in July      Called and relayed message to pt.  Have rescheduled him for 7/24/19.    Sweetie Hernandez RN  Rheumatology Clinic

## 2019-06-24 NOTE — TELEPHONE ENCOUNTER
Michelle Singer MD Wadsten, Amy; Sweetie Hernandez, RN   Caller: Unspecified (1 week ago)             We should proceed with enbrel 50 mg qwk inj as it is already approved.     actemra was a better option but got denied by insurance.      Called and spoke with pt, he continues on 20 mg a day of prednisone, is keeping the stiffness and pain at bay. Ok with starting Enbrel.  [facility].    Pt is wondering if he should still come to see Dr. Singer this week.  Since he hasn't started the medication yet, is there any point.    Will let pt know the answer.    Sweetie Hernandez RN  Rheumatology Clinic

## 2019-06-24 NOTE — TELEPHONE ENCOUNTER
Actemra has not been approved, will start with Enbrel.    Sweetie Hernandez RN  Rheumatology Clinic

## 2019-06-25 ENCOUNTER — TELEPHONE (OUTPATIENT)
Dept: RHEUMATOLOGY | Facility: CLINIC | Age: 59
End: 2019-06-25

## 2019-06-25 NOTE — TELEPHONE ENCOUNTER
[facility] Call Center    Phone Message    May a detailed message be left on voicemail: yes    Reason for Call: Medication Question or concern regarding medication   Prescription Clarification  Name of Medication: Embrel 50 mg  Prescribing Provider: Dr. Singer   Pharmacy: NA   What on the order needs clarification? Pt wants to clarification on stopping the Prednisone when he starts the Embrel 50 mg.          Action Taken: Message routed to:  [facility] ([facility] RAFI Mcdonald

## 2019-06-26 NOTE — TELEPHONE ENCOUNTER
Michelle Singer MD Beard, Sweetie, RN   Caller: Unspecified (Yesterday,  3:53 PM)             It depends on his pain, it takes 2-3 wk to work if it works.      Sent pt my chart message asking him to let me know after he has been on the medication for 2-3 weeks.    Sweetie Hernandez RN  Rheumatology Clinic

## 2019-06-28 NOTE — RESULT ENCOUNTER NOTE
Positive EDELMIRA could be due to cancer and is non specific. The rest of rheumatologic work up is negative. How is your joint pain? We'll start tapering prednisone down based your joint pain and response to enbrel.

## 2019-07-01 ENCOUNTER — ONCOLOGY VISIT (OUTPATIENT)
Dept: ONCOLOGY | Facility: CLINIC | Age: 59
End: 2019-07-01
Attending: PHYSICIAN ASSISTANT
Payer: COMMERCIAL

## 2019-07-01 ENCOUNTER — INFUSION THERAPY VISIT (OUTPATIENT)
Dept: ONCOLOGY | Facility: CLINIC | Age: 59
End: 2019-07-01
Attending: INTERNAL MEDICINE
Payer: COMMERCIAL

## 2019-07-01 VITALS
HEART RATE: 81 BPM | TEMPERATURE: 97.5 F | BODY MASS INDEX: 39.06 KG/M2 | OXYGEN SATURATION: 98 % | SYSTOLIC BLOOD PRESSURE: 134 MMHG | WEIGHT: 315 LBS | RESPIRATION RATE: 18 BRPM | DIASTOLIC BLOOD PRESSURE: 90 MMHG

## 2019-07-01 DIAGNOSIS — C15.5 CANCER OF DISTAL THIRD OF ESOPHAGUS (H): ICD-10-CM

## 2019-07-01 DIAGNOSIS — T45.1X5A CHEMOTHERAPY-INDUCED NEUTROPENIA (H): Primary | ICD-10-CM

## 2019-07-01 DIAGNOSIS — D70.1 CHEMOTHERAPY-INDUCED NEUTROPENIA (H): Primary | ICD-10-CM

## 2019-07-01 LAB
ALBUMIN SERPL-MCNC: 3.6 G/DL (ref 3.4–5)
ALP SERPL-CCNC: 80 U/L (ref 40–150)
ALT SERPL W P-5'-P-CCNC: 33 U/L (ref 0–70)
ANION GAP SERPL CALCULATED.3IONS-SCNC: 6 MMOL/L (ref 3–14)
AST SERPL W P-5'-P-CCNC: 19 U/L (ref 0–45)
BASOPHILS # BLD AUTO: 0 10E9/L (ref 0–0.2)
BASOPHILS NFR BLD AUTO: 0.2 %
BILIRUB SERPL-MCNC: 0.5 MG/DL (ref 0.2–1.3)
BUN SERPL-MCNC: 13 MG/DL (ref 7–30)
CALCIUM SERPL-MCNC: 8.9 MG/DL (ref 8.5–10.1)
CHLORIDE SERPL-SCNC: 109 MMOL/L (ref 94–109)
CO2 SERPL-SCNC: 25 MMOL/L (ref 20–32)
CREAT SERPL-MCNC: 0.87 MG/DL (ref 0.66–1.25)
DIFFERENTIAL METHOD BLD: ABNORMAL
EOSINOPHIL # BLD AUTO: 0 10E9/L (ref 0–0.7)
EOSINOPHIL NFR BLD AUTO: 0.6 %
ERYTHROCYTE [DISTWIDTH] IN BLOOD BY AUTOMATED COUNT: 13.7 % (ref 10–15)
GFR SERPL CREATININE-BSD FRML MDRD: >90 ML/MIN/{1.73_M2}
GLUCOSE SERPL-MCNC: 104 MG/DL (ref 70–99)
HCT VFR BLD AUTO: 42.1 % (ref 40–53)
HGB BLD-MCNC: 14.2 G/DL (ref 13.3–17.7)
IMM GRANULOCYTES # BLD: 0 10E9/L (ref 0–0.4)
IMM GRANULOCYTES NFR BLD: 0.8 %
LYMPHOCYTES # BLD AUTO: 1.3 10E9/L (ref 0.8–5.3)
LYMPHOCYTES NFR BLD AUTO: 24.4 %
MCH RBC QN AUTO: 30.5 PG (ref 26.5–33)
MCHC RBC AUTO-ENTMCNC: 33.7 G/DL (ref 31.5–36.5)
MCV RBC AUTO: 90 FL (ref 78–100)
MONOCYTES # BLD AUTO: 0.2 10E9/L (ref 0–1.3)
MONOCYTES NFR BLD AUTO: 3.9 %
NEUTROPHILS # BLD AUTO: 3.6 10E9/L (ref 1.6–8.3)
NEUTROPHILS NFR BLD AUTO: 70.1 %
NRBC # BLD AUTO: 0 10*3/UL
NRBC BLD AUTO-RTO: 0 /100
PLATELET # BLD AUTO: 144 10E9/L (ref 150–450)
POTASSIUM SERPL-SCNC: 4.1 MMOL/L (ref 3.4–5.3)
PROT SERPL-MCNC: 7 G/DL (ref 6.8–8.8)
RBC # BLD AUTO: 4.66 10E12/L (ref 4.4–5.9)
SODIUM SERPL-SCNC: 140 MMOL/L (ref 133–144)
TSH SERPL DL<=0.005 MIU/L-ACNC: 2.19 MU/L (ref 0.4–4)
WBC # BLD AUTO: 5.2 10E9/L (ref 4–11)

## 2019-07-01 PROCEDURE — G0463 HOSPITAL OUTPT CLINIC VISIT: HCPCS | Mod: ZF

## 2019-07-01 PROCEDURE — 25000128 H RX IP 250 OP 636: Mod: ZF | Performed by: INTERNAL MEDICINE

## 2019-07-01 PROCEDURE — 85025 COMPLETE CBC W/AUTO DIFF WBC: CPT | Performed by: INTERNAL MEDICINE

## 2019-07-01 PROCEDURE — 96413 CHEMO IV INFUSION 1 HR: CPT

## 2019-07-01 PROCEDURE — 84443 ASSAY THYROID STIM HORMONE: CPT | Performed by: INTERNAL MEDICINE

## 2019-07-01 PROCEDURE — 99215 OFFICE O/P EST HI 40 MIN: CPT | Mod: ZP | Performed by: INTERNAL MEDICINE

## 2019-07-01 PROCEDURE — 25800030 ZZH RX IP 258 OP 636: Mod: ZF | Performed by: INTERNAL MEDICINE

## 2019-07-01 PROCEDURE — 80053 COMPREHEN METABOLIC PANEL: CPT | Performed by: INTERNAL MEDICINE

## 2019-07-01 RX ORDER — HEPARIN SODIUM (PORCINE) LOCK FLUSH IV SOLN 100 UNIT/ML 100 UNIT/ML
500 SOLUTION INTRAVENOUS ONCE
Status: CANCELLED | OUTPATIENT
Start: 2019-07-01

## 2019-07-01 RX ORDER — LORAZEPAM 2 MG/ML
0.5 INJECTION INTRAMUSCULAR EVERY 4 HOURS PRN
Status: CANCELLED
Start: 2019-07-01

## 2019-07-01 RX ORDER — EPINEPHRINE 1 MG/ML
0.3 INJECTION, SOLUTION INTRAMUSCULAR; SUBCUTANEOUS EVERY 5 MIN PRN
Status: CANCELLED | OUTPATIENT
Start: 2019-07-01

## 2019-07-01 RX ORDER — MEPERIDINE HYDROCHLORIDE 25 MG/ML
25 INJECTION INTRAMUSCULAR; INTRAVENOUS; SUBCUTANEOUS EVERY 30 MIN PRN
Status: CANCELLED | OUTPATIENT
Start: 2019-07-01

## 2019-07-01 RX ORDER — DIPHENHYDRAMINE HYDROCHLORIDE 50 MG/ML
50 INJECTION INTRAMUSCULAR; INTRAVENOUS
Status: CANCELLED
Start: 2019-07-01

## 2019-07-01 RX ORDER — METHYLPREDNISOLONE SODIUM SUCCINATE 125 MG/2ML
125 INJECTION, POWDER, LYOPHILIZED, FOR SOLUTION INTRAMUSCULAR; INTRAVENOUS
Status: CANCELLED
Start: 2019-07-01

## 2019-07-01 RX ORDER — HEPARIN SODIUM (PORCINE) LOCK FLUSH IV SOLN 100 UNIT/ML 100 UNIT/ML
500 SOLUTION INTRAVENOUS ONCE
Status: COMPLETED | OUTPATIENT
Start: 2019-07-01 | End: 2019-07-01

## 2019-07-01 RX ORDER — ALBUTEROL SULFATE 0.83 MG/ML
2.5 SOLUTION RESPIRATORY (INHALATION)
Status: CANCELLED | OUTPATIENT
Start: 2019-07-01

## 2019-07-01 RX ORDER — HEPARIN SODIUM (PORCINE) LOCK FLUSH IV SOLN 100 UNIT/ML 100 UNIT/ML
5 SOLUTION INTRAVENOUS EVERY 8 HOURS
Status: DISCONTINUED | OUTPATIENT
Start: 2019-07-01 | End: 2019-07-01 | Stop reason: HOSPADM

## 2019-07-01 RX ORDER — SODIUM CHLORIDE 9 MG/ML
1000 INJECTION, SOLUTION INTRAVENOUS CONTINUOUS PRN
Status: CANCELLED
Start: 2019-07-01

## 2019-07-01 RX ORDER — ALBUTEROL SULFATE 90 UG/1
1-2 AEROSOL, METERED RESPIRATORY (INHALATION)
Status: CANCELLED
Start: 2019-07-01

## 2019-07-01 RX ORDER — EPINEPHRINE 0.3 MG/.3ML
0.3 INJECTION SUBCUTANEOUS EVERY 5 MIN PRN
Status: CANCELLED | OUTPATIENT
Start: 2019-07-01

## 2019-07-01 RX ADMIN — SODIUM CHLORIDE 250 ML: 9 INJECTION, SOLUTION INTRAVENOUS at 13:27

## 2019-07-01 RX ADMIN — SODIUM CHLORIDE 200 MG: 9 INJECTION, SOLUTION INTRAVENOUS at 13:27

## 2019-07-01 RX ADMIN — HEPARIN 500 UNITS: 100 SYRINGE at 14:14

## 2019-07-01 RX ADMIN — HEPARIN 5 ML: 100 SYRINGE at 11:11

## 2019-07-01 ASSESSMENT — PAIN SCALES - GENERAL: PAINLEVEL: NO PAIN (0)

## 2019-07-01 NOTE — PROGRESS NOTES
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 His tumor is positive for cytokeratin 20, negative for P63 and CK7, and HER2 is negative. He started on FOLFOX (5FU/oxaliplatin) on 5/15/2017. He had a delay in treatment from 9/5-10/2/17 due to work related injury.    He had an excellent response by imaging throughout the summer 2017.    He a mixed response by imaging in late fall 2017.    In January 2018, he was switched to taxol and cyramza - had slight progression and neuropathy and clinical trial became available.       In March 2018, he was started on the [facility] Match Clinical Trial with crizotinib.  (MET amplification) He remained on crizotinib from March - July 2018.    August 2018, he was switched back to taxol/cramza.  In October, he was switched to Keytruda (PD1 overexpressor).    In April 2019, his infusion was held for three weeks, and he was treated with prednisone for grade 3 arthralgias.  Keytruda was resumed.  He is now seeing rheumatology and is on prednisone 20 mg daily.    INTERVAL HISTORY: Levi comes in today for followup. He is now on Keytruda every three weeks.      He is on prednisone 20 mg daily.  He is feeling better and is very happy about this.  Hand/ strength is much improved.  He started Enbrel on Friday.       He has had no issues with fevers or chills, no chest pain or shortness of breath, no nausea, vomiting, diarrhea or constipation.         He has no abdminal pain.  No n/v/d/c.       His energy levels are good.  He is thinking about taking a trip this summer to [place].     No dysphagia.        ROS: 10 point ROS neg other than the symptoms noted above in the HPI.    PHYSICAL EXAMINATION  /90 (BP Location: Right arm, Patient Position: Sitting, Cuff Size: Adult Regular)    Pulse 81   Temp 97.5  F (36.4  C) (Oral)   Resp 18   Wt (!) 153.3 kg (338 lb)   SpO2 98%   BMI 39.06 kg/m     Wt Readings from Last 4 Encounters:   07/01/19 (!) 153.3 kg (338 lb)   06/11/19 (!) 152.7 kg (336 lb 9.6 oz)   05/29/19 (!) 151.7 kg (334 lb 6.4 oz)   05/20/19 (!) 152.2 kg (335 lb 9.6 oz)     Constitutional: Alert, oriented male in no visible distress.  Eyes: PERRL. Anicteric sclerae.  ENT/Mouth: OM moist and pink without lesions or thrush.  CV: RRR  Resp: CTAB throughout  Abdomen: Soft, non-tender, non-distended. Obese. Bowel sounds present. Unable to palpate liver or spleen.  No RLQ tenderness.  Extremities: trace edema , no erythema or warmth over joints  Skin: Warm, dry.   Lymph: No cervical or supraclavicular lymphadenopathy appreciated.   Neuro: CN II-XII grossly intact.     MUSC: no erythema or warmth over shoulders, DIPs, PIPs.  Hand strength normal  ECOG PS: 1          Lab Results   Component Value Date    WBC 5.2 07/01/2019     Lab Results   Component Value Date    RBC 4.66 07/01/2019     Lab Results   Component Value Date    HGB 14.2 07/01/2019     Lab Results   Component Value Date    HCT 42.1 07/01/2019     No components found for: MCT  Lab Results   Component Value Date    MCV 90 07/01/2019     Lab Results   Component Value Date    MCH 30.5 07/01/2019     Lab Results   Component Value Date    MCHC 33.7 07/01/2019     Lab Results   Component Value Date    RDW 13.7 07/01/2019     Lab Results   Component Value Date     07/01/2019       Recent Labs   Lab Test 07/01/19  1114 06/11/19  0707    145*   POTASSIUM 4.1 3.7   CHLORIDE 109 114*   CO2 25 25   ANIONGAP 6 6   * 105*   BUN 13 19   CR 0.87 0.79   NIDHI 8.9 8.5     Liver Function Studies -   Recent Labs   Lab Test 07/01/19  1114   PROTTOTAL 7.0   ALBUMIN 3.6   BILITOTAL 0.5   ALKPHOS 80   AST 19   ALT 33       IMPRESSION/PLAN:  1. Metastatic esophogeal adenocarcinoma. He has had treatment with FOLFOX and then transitioned  to Taxol with ramicurimab.  He was then on crizotinib on the [employer] Match study.  He progressed on this and returned on taxol/cyramza. And is now on Keytruda.    I am very pleased with how he is doing on Keytruda.  He did develop grade 3 arthralgias and is now on prednisone and Enbrel.  He is being followed by rheumatology.    Reviewed with several colleagues duration of Keytruda.  For now, will continue current plan of care.    Will plan to repeat imaging in August.    We discussed that he was screened for entrectinib and does not have identifiable mutations for this medication.    Other treatment options would include possible irinotecan or consideration of a phase 1 study (NK FATE)     A history of PE.  He is on Lovenox twice daily.  He has progressed through Xarelto in the past.      He does have baseline neuropathy and is on gabapentin 600/300/600 mg daily.           He understands his therapies are not curative intent but rather palliative.  Additional supportive care measures are discussed.    Kadi Dee MD

## 2019-07-01 NOTE — PROGRESS NOTES
Infusion Nursing Note:  Reuben Padilla presents today for Cycle 11 Day 1 Keytruda.    Patient seen by provider today: Yes: Dr. Dee   present during visit today: Not Applicable.    Note: Patient presents to infusion today following his provider appointment. Patient denies any questions or concerns at this time.    Intravenous Access:  Implanted Port.    Treatment Conditions:  Lab Results   Component Value Date    HGB 14.2 07/01/2019     Lab Results   Component Value Date    WBC 5.2 07/01/2019      Lab Results   Component Value Date    ANEU 3.6 07/01/2019     Lab Results   Component Value Date     07/01/2019      Lab Results   Component Value Date     07/01/2019                   Lab Results   Component Value Date    POTASSIUM 4.1 07/01/2019           Lab Results   Component Value Date    MAG 1.9 11/07/2018            Lab Results   Component Value Date    CR 0.87 07/01/2019                   Lab Results   Component Value Date    NIDHI 8.9 07/01/2019                Lab Results   Component Value Date    BILITOTAL 0.5 07/01/2019           Lab Results   Component Value Date    ALBUMIN 3.6 07/01/2019                    Lab Results   Component Value Date    ALT 33 07/01/2019           Lab Results   Component Value Date    AST 19 07/01/2019       Results reviewed, labs MET treatment parameters, ok to proceed with treatment.      Post Infusion Assessment:  Patient tolerated infusion without incident.  Blood return noted pre and post infusion.  Site patent and intact, free from redness, edema or discomfort.  No evidence of extravasations.  Access discontinued per protocol.       Discharge Plan:   Patient declined prescription refills.  Discharge instructions reviewed with: Patient.  Patient and/or family verbalized understanding of discharge instructions and all questions answered.  AVS to patient via [employer].  Patient will return 7/22/19 for next appointment.   Patient discharged in stable condition  accompanied by: self.  Departure Mode: Ambulatory.    Anat López RN

## 2019-07-01 NOTE — LETTER
7/1/2019       RE: Reuben Padilla  [address] [place] [id number]     Dear Colleague,    Thank you for referring your patient, Reuben Padilla, to the [facility]. Please see a copy of my visit note below.    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 His tumor is positive for cytokeratin 20, negative for P63 and CK7, and HER2 is negative. He started on FOLFOX (5FU/oxaliplatin) on 5/15/2017. He had a delay in treatment from 9/5-10/2/17 due to work related injury.    He had an excellent response by imaging throughout the summer 2017.    He a mixed response by imaging in late fall 2017.    In January 2018, he was switched to taxol and cyramza - had slight progression and neuropathy and clinical trial became available.       In March 2018, he was started on the [facility] Match Clinical Trial with crizotinib.  (MET amplification) He remained on crizotinib from March - July 2018.    August 2018, he was switched back to taxol/cramza.  In October, he was switched to Keytruda (PD1 overexpressor).    In April 2019, his infusion was held for three weeks, and he was treated with prednisone for grade 3 arthralgias.  Keytruda was resumed.  He is now seeing rheumatology and is on prednisone 20 mg daily.    INTERVAL HISTORY: Levi comes in today for followup. He is now on Keytruda every three weeks.      He is on prednisone 20 mg daily.  He is feeling better and is very happy about this.  Hand/ strength is much improved.  He started Enbrel on Friday.       He has had no issues with fevers or chills, no chest pain or shortness of breath, no nausea, vomiting, diarrhea or constipation.         He has no abdminal pain.  No n/v/d/c.       His energy levels are good.  He is thinking about taking a  trip this summer to [place].     No dysphagia.        ROS: 10 point ROS neg other than the symptoms noted above in the HPI.    PHYSICAL EXAMINATION  /90 (BP Location: Right arm, Patient Position: Sitting, Cuff Size: Adult Regular)   Pulse 81   Temp 97.5  F (36.4  C) (Oral)   Resp 18   Wt (!) 153.3 kg (338 lb)   SpO2 98%   BMI 39.06 kg/m      Wt Readings from Last 4 Encounters:   07/01/19 (!) 153.3 kg (338 lb)   06/11/19 (!) 152.7 kg (336 lb 9.6 oz)   05/29/19 (!) 151.7 kg (334 lb 6.4 oz)   05/20/19 (!) 152.2 kg (335 lb 9.6 oz)     Constitutional: Alert, oriented male in no visible distress.  Eyes: PERRL. Anicteric sclerae.  ENT/Mouth: OM moist and pink without lesions or thrush.  CV: RRR  Resp: CTAB throughout  Abdomen: Soft, non-tender, non-distended. Obese. Bowel sounds present. Unable to palpate liver or spleen.  No RLQ tenderness.  Extremities: trace edema , no erythema or warmth over joints  Skin: Warm, dry.   Lymph: No cervical or supraclavicular lymphadenopathy appreciated.   Neuro: CN II-XII grossly intact.     MUSC: no erythema or warmth over shoulders, DIPs, PIPs.  Hand strength normal  ECOG PS: 1          Lab Results   Component Value Date    WBC 5.2 07/01/2019     Lab Results   Component Value Date    RBC 4.66 07/01/2019     Lab Results   Component Value Date    HGB 14.2 07/01/2019     Lab Results   Component Value Date    HCT 42.1 07/01/2019     No components found for: MCT  Lab Results   Component Value Date    MCV 90 07/01/2019     Lab Results   Component Value Date    MCH 30.5 07/01/2019     Lab Results   Component Value Date    MCHC 33.7 07/01/2019     Lab Results   Component Value Date    RDW 13.7 07/01/2019     Lab Results   Component Value Date     07/01/2019       Recent Labs   Lab Test 07/01/19  1114 06/11/19  0707    145*   POTASSIUM 4.1 3.7   CHLORIDE 109 114*   CO2 25 25   ANIONGAP 6 6   * 105*   BUN 13 19   CR 0.87 0.79   NIDHI 8.9 8.5     Liver Function  Studies -   Recent Labs   Lab Test 07/01/19  1114   PROTTOTAL 7.0   ALBUMIN 3.6   BILITOTAL 0.5   ALKPHOS 80   AST 19   ALT 33       IMPRESSION/PLAN:  1. Metastatic esophogeal adenocarcinoma. He has had treatment with FOLFOX and then transitioned to Taxol with ramicurimab.  He was then on crizotinib on the [employer] Match study.  He progressed on this and returned on taxol/cyramza. And is now on Keytruda.    I am very pleased with how he is doing on Keytruda.  He did develop grade 3 arthralgias and is now on prednisone and Enbrel.  He is being followed by rheumatology.    Reviewed with several colleagues duration of Keytruda.  For now, will continue current plan of care.    Will plan to repeat imaging in August.    We discussed that he was screened for entrectinib and does not have identifiable mutations for this medication.    Other treatment options would include possible irinotecan or consideration of a phase 1 study (NK FATE)     A history of PE.  He is on Lovenox twice daily.  He has progressed through Xarelto in the past.      He does have baseline neuropathy and is on gabapentin 600/300/600 mg daily.           He understands his therapies are not curative intent but rather palliative.  Additional supportive care measures are discussed.    Kadi Dee MD

## 2019-07-01 NOTE — NURSING NOTE
"Oncology Rooming Note    July 1, 2019 12:03 PM   Reuben Padilla is a 58 year old male who presents for:    Chief Complaint   Patient presents with     Port Draw     Labs drawn via PORT by RN in lab. Line flushed with saline and heparin. VS taken.      Oncology Clinic Visit     [facility] RETURN- ESOPHAGEAL CA     Initial Vitals: /90 (BP Location: Right arm, Patient Position: Sitting, Cuff Size: Adult Regular)   Pulse 81   Temp 97.5  F (36.4  C) (Oral)   Resp 18   Wt (!) 153.3 kg (338 lb)   SpO2 98%   BMI 39.06 kg/m   Estimated body mass index is 39.06 kg/m  as calculated from the following:    Height as of 5/29/19: 1.981 m (6' 6\").    Weight as of this encounter: 153.3 kg (338 lb). Body surface area is 2.9 meters squared.  No Pain (0) Comment: Data Unavailable   No LMP for male patient.  Allergies reviewed: Yes  Medications reviewed: Yes    Medications: Medication refills not needed today.  Pharmacy name entered into EPIC:    [id number] [place] - [address] [place] [address] AT INTERSECTION [address] [place] [address] - [address] [facility] [address]  [id number] [place], [place] - [address] [facility]  [id number] - [place], [place] - [address]  [facility] - [address]    Clinical concerns: No new concerns. Leila was notified.      Haider Meraz LPN            "

## 2019-07-01 NOTE — PATIENT INSTRUCTIONS
Contact Numbers    [facility] [place]: [phone]  [facility] Triage and after hours / weekends / holidays:  [phone]      Please call the triage or after hours line if you experience a temperature greater than or equal to 100.5, shaking chills, have uncontrolled nausea, vomiting and/or diarrhea, dizziness, shortness of breath, chest pain, bleeding, unexplained bruising, or if you have any other new/concerning symptoms, questions or concerns.      If you are having any concerning symptoms or wish to speak to a provider before your next infusion visit, please call your care coordinator or triage to notify them so we can adequately serve you.     If you need a refill on a narcotic prescription or other medication, please call before your infusion appointment.         July 2019 Sunday Monday Tuesday Wednesday Thursday Friday Saturday        1    [facility] DRAW  11:15 AM   (15 min.)    [facility] DRAW   [facility] Draw    [facility] RETURN  11:45 AM   (30 min.)   Kadi Dee [facility]   [facility]   1:00 PM   (60 min.)    ONCOLOGY INFUSION   [facility] 2     3     4     5     6       7     8     9     10     11     12     13       14     15     16     17     18     19     20       21     22    [facility] DRAW   7:45 AM   (15 min.)    [facility]   [facility]    [facility] RETURN   8:15 AM   (50 min.)   Loraine Sutherland PA-C   [facility] 60   9:30 AM   (60 min.)    ONCOLOGY INFUSION   [facility] 23     24    [facility] RETURN  10:15 AM   (30 min.)   Michelle Singer MD   [facility] Rheumatology 25     26     27       28     29     30     31 August 2019 Sunday Monday Tuesday Wednesday Thursday Friday Saturday                       1     2     3       4     5     6     7     8     9     10       11     12     13    [facility]  DRAW   7:00 AM   (15 min.)   [facility] LAB DRAW   [facility] Lab Draw    CT CHEST ABDOMEN PELVIS WWO   7:40 AM   (20 min.)   UCCT1   [facility] CT    [facility] RETURN  11:25 AM   (50 min.)   Loraine Sutherland PA-C   [facility] ONC INFUSION 60  12:30 PM   (60 min.)    ONCOLOGY INFUSION   [facility] 14     15     16     17       18     19     20     21     22     23     24       25     26     27     28     29     30     31  Happy Birthday!                     Lab Results:  Recent Results (from the past 12 hour(s))   Comprehensive metabolic panel    Collection Time: 07/01/19 11:14 AM   Result Value Ref Range    Sodium 140 133 - 144 mmol/L    Potassium 4.1 3.4 - 5.3 mmol/L    Chloride 109 94 - 109 mmol/L    Carbon Dioxide 25 20 - 32 mmol/L    Anion Gap 6 3 - 14 mmol/L    Glucose 104 (H) 70 - 99 mg/dL    Urea Nitrogen 13 7 - 30 mg/dL    Creatinine 0.87 0.66 - 1.25 mg/dL    GFR Estimate >90 >60 mL/min/[1.73_m2]    GFR Estimate If Black >90 >60 mL/min/[1.73_m2]    Calcium 8.9 8.5 - 10.1 mg/dL    Bilirubin Total 0.5 0.2 - 1.3 mg/dL    Albumin 3.6 3.4 - 5.0 g/dL    Protein Total 7.0 6.8 - 8.8 g/dL    Alkaline Phosphatase 80 40 - 150 U/L    ALT 33 0 - 70 U/L    AST 19 0 - 45 U/L   TSH with free T4 reflex    Collection Time: 07/01/19 11:14 AM   Result Value Ref Range    TSH 2.19 0.40 - 4.00 mU/L   CBC with platelets differential    Collection Time: 07/01/19 11:15 AM   Result Value Ref Range    WBC 5.2 4.0 - 11.0 10e9/L    RBC Count 4.66 4.4 - 5.9 10e12/L    Hemoglobin 14.2 13.3 - 17.7 g/dL    Hematocrit 42.1 40.0 - 53.0 %    MCV 90 78 - 100 fl    MCH 30.5 26.5 - 33.0 pg    MCHC 33.7 31.5 - 36.5 g/dL    RDW 13.7 10.0 - 15.0 %    Platelet Count 144 (L) 150 - 450 10e9/L    Diff Method Automated Method     % Neutrophils 70.1 %    % Lymphocytes 24.4 %    % Monocytes 3.9 %    % Eosinophils 0.6 %    % Basophils 0.2 %    % Immature Granulocytes 0.8 %    Nucleated RBCs 0  0 /100    Absolute Neutrophil 3.6 1.6 - 8.3 10e9/L    Absolute Lymphocytes 1.3 0.8 - 5.3 10e9/L    Absolute Monocytes 0.2 0.0 - 1.3 10e9/L    Absolute Eosinophils 0.0 0.0 - 0.7 10e9/L    Absolute Basophils 0.0 0.0 - 0.2 10e9/L    Abs Immature Granulocytes 0.0 0 - 0.4 10e9/L    Absolute Nucleated RBC 0.0

## 2019-07-16 ENCOUNTER — TELEPHONE (OUTPATIENT)
Dept: RHEUMATOLOGY | Facility: CLINIC | Age: 59
End: 2019-07-16

## 2019-07-16 DIAGNOSIS — M06.00 SERONEGATIVE RHEUMATOID ARTHRITIS (H): Primary | ICD-10-CM

## 2019-07-16 NOTE — TELEPHONE ENCOUNTER
[facility] Call Center    Phone Message    May a detailed message be left on voicemail: yes    Reason for Call: Medication Question or concern regarding medication   Prescription Clarification  Name of Medication: predniSONE (DELTASONE) 20 MG tablet   Prescribing Provider: Michelle Singer MD   Pharmacy: [id number] - [address]   What on the order needs clarification? Patient stated he was told to call in to inquire about when to stop taking prednisone. Would like a call back from the care team. Please advise.     Action Taken: Message routed to:  [facility] Rheumatology

## 2019-07-18 NOTE — TELEPHONE ENCOUNTER
Returned call to pt, he is feeling ok on the prednisone.  He is not sure what is helping.  He does continue to have some stiffness in his hands, but it is not painful.  Pt would like to try tapering prednisone at this time.     Will see how Dr. Singer would like to taper at this time.    Sweetie Hernandez RN  Rheumatology Clinic

## 2019-07-18 NOTE — TELEPHONE ENCOUNTER
[facility] Call Center    Phone Message    May a detailed message be left on voicemail: yes    Reason for Call: Symptoms or Concerns     If patient has red-flag symptoms, warm transfer to triage line    Current symptom or concern: Patient called again to follow up on his medication question, is wondering why he hasn't received a call yet. Would like a call back from care team. Please advise.       Action Taken: Message routed to:  [facility] Rheumatology

## 2019-07-19 ENCOUNTER — TELEPHONE (OUTPATIENT)
Dept: RHEUMATOLOGY | Facility: CLINIC | Age: 59
End: 2019-07-19

## 2019-07-19 RX ORDER — PREDNISONE 2.5 MG/1
TABLET ORAL
Qty: 20 TABLET | Refills: 0 | Status: SHIPPED | OUTPATIENT
Start: 2019-07-19 | End: 2019-08-09

## 2019-07-19 RX ORDER — PREDNISONE 5 MG/1
TABLET ORAL
Qty: 60 TABLET | Refills: 0 | Status: SHIPPED | OUTPATIENT
Start: 2019-07-19 | End: 2019-09-03

## 2019-07-19 NOTE — TELEPHONE ENCOUNTER
Spoke to patient , called with a reminder about there upcoming appointment , also instructed to bring medications or medication list.    Eliana Ralph CMA

## 2019-07-19 NOTE — TELEPHONE ENCOUNTER
Michelle Singer MD Beard, Madeline, RN   Caller: Unspecified (3 days ago,  3:53 PM)             17.5-15-12.5-10-7.5-5-2.5 mg a day each for 5 days then stop, if flares up should call and let us know. I am glad he is doing better.      Attempting to call, line is busy.    Sweetie Hernandez RN  Rheumatology Clinic

## 2019-07-19 NOTE — TELEPHONE ENCOUNTER
Called and spoke with pt, discussed taper.  He understands, is wondering if he could cancel appt next Wednesday.    Discussed with Dr. Singer, she will see him in the infusion center on Monday.      Updated pt, he is happy with outcome.    Sweetie Hernandez RN  Rheumatology Clinic

## 2019-07-22 ENCOUNTER — OFFICE VISIT (OUTPATIENT)
Dept: RHEUMATOLOGY | Facility: CLINIC | Age: 59
End: 2019-07-22
Attending: INTERNAL MEDICINE
Payer: COMMERCIAL

## 2019-07-22 ENCOUNTER — APPOINTMENT (OUTPATIENT)
Dept: LAB | Facility: CLINIC | Age: 59
End: 2019-07-22
Attending: INTERNAL MEDICINE
Payer: COMMERCIAL

## 2019-07-22 ENCOUNTER — INFUSION THERAPY VISIT (OUTPATIENT)
Dept: ONCOLOGY | Facility: CLINIC | Age: 59
End: 2019-07-22
Attending: INTERNAL MEDICINE
Payer: COMMERCIAL

## 2019-07-22 VITALS
RESPIRATION RATE: 16 BRPM | HEIGHT: 78 IN | BODY MASS INDEX: 36.45 KG/M2 | DIASTOLIC BLOOD PRESSURE: 90 MMHG | TEMPERATURE: 97.9 F | WEIGHT: 315 LBS | HEART RATE: 91 BPM | SYSTOLIC BLOOD PRESSURE: 125 MMHG | OXYGEN SATURATION: 94 %

## 2019-07-22 DIAGNOSIS — D70.1 CHEMOTHERAPY-INDUCED NEUTROPENIA (H): ICD-10-CM

## 2019-07-22 DIAGNOSIS — T45.1X5A CHEMOTHERAPY-INDUCED NEUTROPENIA (H): ICD-10-CM

## 2019-07-22 DIAGNOSIS — C15.5 CANCER OF DISTAL THIRD OF ESOPHAGUS (H): Primary | ICD-10-CM

## 2019-07-22 DIAGNOSIS — M06.00 SERONEGATIVE RHEUMATOID ARTHRITIS (H): Primary | ICD-10-CM

## 2019-07-22 DIAGNOSIS — C15.5 CANCER OF DISTAL THIRD OF ESOPHAGUS (H): ICD-10-CM

## 2019-07-22 DIAGNOSIS — R19.7 DIARRHEA, UNSPECIFIED TYPE: ICD-10-CM

## 2019-07-22 DIAGNOSIS — Z51.81 ENCOUNTER FOR MEDICATION MONITORING: ICD-10-CM

## 2019-07-22 LAB
ALBUMIN SERPL-MCNC: 4 G/DL (ref 3.4–5)
ALP SERPL-CCNC: 97 U/L (ref 40–150)
ALT SERPL W P-5'-P-CCNC: 29 U/L (ref 0–70)
ANION GAP SERPL CALCULATED.3IONS-SCNC: 6 MMOL/L (ref 3–14)
AST SERPL W P-5'-P-CCNC: 13 U/L (ref 0–45)
BASOPHILS # BLD AUTO: 0 10E9/L (ref 0–0.2)
BASOPHILS NFR BLD AUTO: 0.3 %
BILIRUB SERPL-MCNC: 0.6 MG/DL (ref 0.2–1.3)
BUN SERPL-MCNC: 20 MG/DL (ref 7–30)
CALCIUM SERPL-MCNC: 9.3 MG/DL (ref 8.5–10.1)
CHLORIDE SERPL-SCNC: 109 MMOL/L (ref 94–109)
CO2 SERPL-SCNC: 23 MMOL/L (ref 20–32)
CREAT SERPL-MCNC: 0.9 MG/DL (ref 0.66–1.25)
DIFFERENTIAL METHOD BLD: ABNORMAL
EOSINOPHIL # BLD AUTO: 0 10E9/L (ref 0–0.7)
EOSINOPHIL NFR BLD AUTO: 0.4 %
ERYTHROCYTE [DISTWIDTH] IN BLOOD BY AUTOMATED COUNT: 13.9 % (ref 10–15)
GFR SERPL CREATININE-BSD FRML MDRD: >90 ML/MIN/{1.73_M2}
GLUCOSE SERPL-MCNC: 116 MG/DL (ref 70–99)
HCT VFR BLD AUTO: 45.4 % (ref 40–53)
HGB BLD-MCNC: 15.2 G/DL (ref 13.3–17.7)
IMM GRANULOCYTES # BLD: 0 10E9/L (ref 0–0.4)
IMM GRANULOCYTES NFR BLD: 0.6 %
LYMPHOCYTES # BLD AUTO: 0.9 10E9/L (ref 0.8–5.3)
LYMPHOCYTES NFR BLD AUTO: 12.8 %
MCH RBC QN AUTO: 30.6 PG (ref 26.5–33)
MCHC RBC AUTO-ENTMCNC: 33.5 G/DL (ref 31.5–36.5)
MCV RBC AUTO: 91 FL (ref 78–100)
MONOCYTES # BLD AUTO: 0.4 10E9/L (ref 0–1.3)
MONOCYTES NFR BLD AUTO: 5.2 %
NEUTROPHILS # BLD AUTO: 5.5 10E9/L (ref 1.6–8.3)
NEUTROPHILS NFR BLD AUTO: 80.7 %
NRBC # BLD AUTO: 0 10*3/UL
NRBC BLD AUTO-RTO: 0 /100
PLATELET # BLD AUTO: 136 10E9/L (ref 150–450)
POTASSIUM SERPL-SCNC: 4 MMOL/L (ref 3.4–5.3)
PROT SERPL-MCNC: 7.3 G/DL (ref 6.8–8.8)
RBC # BLD AUTO: 4.97 10E12/L (ref 4.4–5.9)
SODIUM SERPL-SCNC: 138 MMOL/L (ref 133–144)
TSH SERPL DL<=0.005 MIU/L-ACNC: 1.05 MU/L (ref 0.4–4)
WBC # BLD AUTO: 6.8 10E9/L (ref 4–11)

## 2019-07-22 PROCEDURE — 25000128 H RX IP 250 OP 636: Mod: ZF | Performed by: PHYSICIAN ASSISTANT

## 2019-07-22 PROCEDURE — 80053 COMPREHEN METABOLIC PANEL: CPT | Performed by: INTERNAL MEDICINE

## 2019-07-22 PROCEDURE — 25800030 ZZH RX IP 258 OP 636: Mod: ZF | Performed by: PHYSICIAN ASSISTANT

## 2019-07-22 PROCEDURE — 84443 ASSAY THYROID STIM HORMONE: CPT | Performed by: INTERNAL MEDICINE

## 2019-07-22 PROCEDURE — 99214 OFFICE O/P EST MOD 30 MIN: CPT | Mod: ZP | Performed by: PHYSICIAN ASSISTANT

## 2019-07-22 PROCEDURE — 96413 CHEMO IV INFUSION 1 HR: CPT | Mod: 25

## 2019-07-22 PROCEDURE — G0463 HOSPITAL OUTPT CLINIC VISIT: HCPCS | Mod: ZF

## 2019-07-22 PROCEDURE — 85025 COMPLETE CBC W/AUTO DIFF WBC: CPT | Performed by: INTERNAL MEDICINE

## 2019-07-22 RX ORDER — DIPHENOXYLATE HCL/ATROPINE 2.5-.025MG
1-2 TABLET ORAL 4 TIMES DAILY PRN
Qty: 100 TABLET | Refills: 1 | Status: SHIPPED | OUTPATIENT
Start: 2019-07-22 | End: 2020-02-20

## 2019-07-22 RX ORDER — ALBUTEROL SULFATE 0.83 MG/ML
2.5 SOLUTION RESPIRATORY (INHALATION)
Status: CANCELLED | OUTPATIENT
Start: 2019-07-22

## 2019-07-22 RX ORDER — HEPARIN SODIUM (PORCINE) LOCK FLUSH IV SOLN 100 UNIT/ML 100 UNIT/ML
5 SOLUTION INTRAVENOUS EVERY 8 HOURS
Status: DISCONTINUED | OUTPATIENT
Start: 2019-07-22 | End: 2019-07-22 | Stop reason: HOSPADM

## 2019-07-22 RX ORDER — DIPHENOXYLATE HCL/ATROPINE 2.5-.025MG
1-2 TABLET ORAL 4 TIMES DAILY PRN
Qty: 100 TABLET | Refills: 1 | Status: SHIPPED | OUTPATIENT
Start: 2019-07-22 | End: 2019-07-22

## 2019-07-22 RX ORDER — HEPARIN SODIUM (PORCINE) LOCK FLUSH IV SOLN 100 UNIT/ML 100 UNIT/ML
500 SOLUTION INTRAVENOUS ONCE
Status: CANCELLED | OUTPATIENT
Start: 2019-07-22

## 2019-07-22 RX ORDER — SODIUM CHLORIDE 9 MG/ML
1000 INJECTION, SOLUTION INTRAVENOUS CONTINUOUS PRN
Status: CANCELLED
Start: 2019-07-22

## 2019-07-22 RX ORDER — MEPERIDINE HYDROCHLORIDE 25 MG/ML
25 INJECTION INTRAMUSCULAR; INTRAVENOUS; SUBCUTANEOUS EVERY 30 MIN PRN
Status: CANCELLED | OUTPATIENT
Start: 2019-07-22

## 2019-07-22 RX ORDER — HEPARIN SODIUM (PORCINE) LOCK FLUSH IV SOLN 100 UNIT/ML 100 UNIT/ML
500 SOLUTION INTRAVENOUS ONCE
Status: COMPLETED | OUTPATIENT
Start: 2019-07-22 | End: 2019-07-22

## 2019-07-22 RX ORDER — DIPHENHYDRAMINE HYDROCHLORIDE 50 MG/ML
50 INJECTION INTRAMUSCULAR; INTRAVENOUS
Status: CANCELLED
Start: 2019-07-22

## 2019-07-22 RX ORDER — LORAZEPAM 2 MG/ML
0.5 INJECTION INTRAMUSCULAR EVERY 4 HOURS PRN
Status: CANCELLED
Start: 2019-07-22

## 2019-07-22 RX ORDER — EPINEPHRINE 0.3 MG/.3ML
0.3 INJECTION SUBCUTANEOUS EVERY 5 MIN PRN
Status: CANCELLED | OUTPATIENT
Start: 2019-07-22

## 2019-07-22 RX ORDER — ALBUTEROL SULFATE 90 UG/1
1-2 AEROSOL, METERED RESPIRATORY (INHALATION)
Status: CANCELLED
Start: 2019-07-22

## 2019-07-22 RX ORDER — AMITRIPTYLINE HYDROCHLORIDE 10 MG/1
10 TABLET ORAL AT BEDTIME
Qty: 30 TABLET | Refills: 3 | Status: SHIPPED | OUTPATIENT
Start: 2019-07-22 | End: 2019-11-26

## 2019-07-22 RX ORDER — EPINEPHRINE 1 MG/ML
0.3 INJECTION, SOLUTION INTRAMUSCULAR; SUBCUTANEOUS EVERY 5 MIN PRN
Status: CANCELLED | OUTPATIENT
Start: 2019-07-22

## 2019-07-22 RX ORDER — METHYLPREDNISOLONE SODIUM SUCCINATE 125 MG/2ML
125 INJECTION, POWDER, LYOPHILIZED, FOR SOLUTION INTRAMUSCULAR; INTRAVENOUS
Status: CANCELLED
Start: 2019-07-22

## 2019-07-22 RX ADMIN — SODIUM CHLORIDE 250 ML: 9 INJECTION, SOLUTION INTRAVENOUS at 10:25

## 2019-07-22 RX ADMIN — SODIUM CHLORIDE 200 MG: 9 INJECTION, SOLUTION INTRAVENOUS at 10:21

## 2019-07-22 RX ADMIN — HEPARIN 5 ML: 100 SYRINGE at 08:30

## 2019-07-22 RX ADMIN — HEPARIN 500 UNITS: 100 SYRINGE at 11:03

## 2019-07-22 ASSESSMENT — PAIN SCALES - GENERAL: PAINLEVEL: NO PAIN (1)

## 2019-07-22 ASSESSMENT — MIFFLIN-ST. JEOR: SCORE: 2495.48

## 2019-07-22 NOTE — PROGRESS NOTES
Infusion Nursing Note:  Reuben Padilla presents today for Cycle 12 Keytruda.    Patient seen by Nicole Sutherland in clinic prior to infusion in clinic.    Intravenous Access:  Peripheral IV placed.  Blood return noted pre and post infusion.     Treatment Conditions:  Lab Results   Component Value Date    HGB 15.2 07/22/2019     Lab Results   Component Value Date    WBC 6.8 07/22/2019      Lab Results   Component Value Date    ANEU 5.5 07/22/2019     Lab Results   Component Value Date     07/22/2019      Lab Results   Component Value Date     07/22/2019                   Lab Results   Component Value Date    POTASSIUM 4.0 07/22/2019           Lab Results   Component Value Date    MAG 1.9 11/07/2018            Lab Results   Component Value Date    CR 0.90 07/22/2019                   Lab Results   Component Value Date    NIDHI 9.3 07/22/2019                Lab Results   Component Value Date    BILITOTAL 0.6 07/22/2019           Lab Results   Component Value Date    ALBUMIN 4.0 07/22/2019                    Lab Results   Component Value Date    ALT 29 07/22/2019           Lab Results   Component Value Date    AST 13 07/22/2019       Results reviewed, labs MET treatment parameters, ok to proceed with treatment.      Post Infusion Assessment:  Patient tolerated infusion without incident.        Discharge Plan:   Patient declined prescription refills.    AVS to patient via [employer].  Patient will return 8/13/19 for cycle 13.  Face to Face time: 0.    Lakeshia Edwards RN

## 2019-07-22 NOTE — PATIENT INSTRUCTIONS
Prednisone taper as planned: 17.5-15-12.5-10-7.5-5-2.5 mg a day each for 5 days then stop    Stay on enbrel weekly    Return in 3-4 months or I could see you at [facility] if I happen to be here on a Tuesday

## 2019-07-22 NOTE — NURSING NOTE
Chief Complaint   Patient presents with     RECHECK     Seronegative rheumatoid arthritis     Pt had vitals taken and meds and allergies reviewed at previous appt.  Bernadette Francisco CMA  7/22/2019 9:53 AM

## 2019-07-22 NOTE — NURSING NOTE
Chief Complaint   Patient presents with     Port Draw     accessed with power needle, heparin locked, vitals checked     Kamilah Jiménez RN on 7/22/2019 at 8:32 AM

## 2019-07-22 NOTE — PATIENT INSTRUCTIONS
Contact Numbers    [facility] [place]: [phone]  [facility] [place] and after hours / weekends / holidays:  [phone]      Please call the triage or after hours line if you experience a temperature greater than or equal to 100.5, shaking chills, have uncontrolled nausea, vomiting and/or diarrhea, dizziness, shortness of breath, chest pain, bleeding, unexplained bruising, or if you have any other new/concerning symptoms, questions or concerns.      If you are having any concerning symptoms or wish to speak to a provider before your next infusion visit, please call your care coordinator or triage to notify them so we can adequately serve you.     If you need a refill on a narcotic prescription or other medication, please call before your infusion appointment.

## 2019-07-22 NOTE — NURSING NOTE
"Oncology Rooming Note    July 22, 2019 8:44 AM   Reuben Padilla is a 58 year old male who presents for:    Chief Complaint   Patient presents with     Port Draw     accessed with power needle, heparin locked, vitals checked     Oncology Clinic Visit     Return: Esophageal CA     Initial Vitals: /90   Pulse 91   Temp 97.9  F (36.6  C)   Resp 16   Ht 1.981 m (6' 6\")   Wt (!) 154.2 kg (340 lb)   SpO2 94%   BMI 39.29 kg/m   Estimated body mass index is 39.29 kg/m  as calculated from the following:    Height as of this encounter: 1.981 m (6' 6\").    Weight as of this encounter: 154.2 kg (340 lb). Body surface area is 2.91 meters squared.  No Pain (1) Comment: Data Unavailable   No LMP for male patient.  Allergies reviewed: Yes  Medications reviewed: Yes    Medications: MEDICATION REFILLS NEEDED TODAY. Provider was notified.  Pharmacy name entered into EPIC:    [id number] [place] - [address] [place] [address] AT INTERSECTION [address] [place], [place] - [address] [place] [address] [address]  [id number] - [place], [place] - [address] [facility]  [id number] - [facility] [address]  [facility] [place] - [address]    Clinical concerns: Pt requesting refill on the Amitriptyline. Loraine ROY was notified.      Bri Harris CMA              "

## 2019-07-22 NOTE — LETTER
7/22/2019      RE: Reuben Padilla  [address] [place] [id number]       Rheumatology F/U Note    Reason for visit: F/U inflammatory arthritis sec check point inhibitor immunotherapy    Initial visit date: 5/29/2019    DOS: 7/22/2019       HPI:    Reuben Padilla is a 58 year old  male who was referred to our clinic for evaluation and management of his check point inhibitor induced inflammatory arthritis.    Got diagnosed with metastatic esophageal cancer (mets in the liver) in 2017. He was put on one of check point inhibitors Keytruda (PD1 overexpressor) in 10/2018. Developed grade 3 musculoskeletal toxicity from immunotherapy. In April 2019, his infusion was held for three weeks, and he was treated with prednisone for grade 3 arthralgias. It involved hands, R shoulder and R arm pain.  Had pain, stiffness and poor , no joint swelling. He came off the drug, was treated with prednsione 80 mg max every day taper which helped. He is off prednisone now and back to keytruda (s/p one infusion) as was very effective for the cancer. He already started to notice recurrence of arthralgia and is afraid that it gets worse over course of TX which is essential to keep his cancer under excellent control. He gets the drug every 3 weeks.    Today, has h/o shoulder ache, but no hand arthritis.    No personal hx of psoraisis or fh/o psoriasis. There is fh/o OA. No fh/o RA, autoimmune disorders. Has a 24 yo son. 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      Has neuropathy in his legs from mid shin to ankles like numbness.      Has dry mouth during sleep. Gained weight on prednisone.     Interval Hx 7/22/2019: Levi was seen at [facility] during Keytruda infusion. He had an oncology visit today, plan is to re-scan in Aug 2019. Doing very well, has no complaints. Is on Enbrel s/p 4 inj, no SEs. Started tapering his prednisone from 20 mg every day, now on 17.5 mg every day since 7/20/2019. No  joint pain (onlt 1/10 pain if he tries hard to make a full fist) or swelling or stiffness.       ROS:  A comprehensive ROS was done, positives are per HPI.        Past Medical History:   Diagnosis Date     Arrhythmia      Cancer (H)     esophageal     Glaucoma      Hypertension      Paroxysmal A-fib (H)      Tubular adenoma 2017     Past Surgical History:   Procedure Laterality Date     AMPUTATION      toe     ARTHROSCOPY KNEE       bunion surgery       CHOLECYSTECTOMY       COLONOSCOPY  2017    remove 2 polyps     ESOPHAGOSCOPY, GASTROSCOPY, DUODENOSCOPY (EGD), COMBINED N/A 10/10/2018    Procedure: COMBINED ESOPHAGOSCOPY, GASTROSCOPY, DUODENOSCOPY (EGD);  Esophagogastroduodenoscopy ;  Surgeon: Leonel Joel MD;  Location: [facility] OR     INSERT PORT VASCULAR ACCESS Right 2017    Procedure: INSERT PORT VASCULAR ACCESS;  Single Lumen Chest Power Port;  Surgeon: Jasiel Hu PA-C;  Location: [facility] OR     FHx: No fhx of RA  Social History     Socioeconomic History     Marital status: Single     Spouse name: Not on file     Number of children: Not on file     Years of education: Not on file     Highest education level: Not on file   Occupational History     Not on file   Social Needs     Financial resource strain: Not on file     Food insecurity:     Worry: Not on file     Inability: Not on file     Transportation needs:     Medical: Not on file     Non-medical: Not on file   Tobacco Use     Smoking status: Former Smoker     Packs/day: 1.00     Years: 20.00     Pack years: 20.00     Types: Cigarettes     Last attempt to quit: 2006     Years since quittin.5     Smokeless tobacco: Never Used   Substance and Sexual Activity     Alcohol use: Yes     Comment: occasional     Drug use: No     Comment: h/o over 30 years ago     Sexual activity: Not on file   Lifestyle     Physical activity:     Days per week: Not on file     Minutes per session: Not on file     Stress: Not on file    Relationships     Social connections:     Talks on phone: Not on file     Gets together: Not on file     Attends Hinduism service: Not on file     Active member of club or organization: Not on file     Attends meetings of clubs or organizations: Not on file     Relationship status: Not on file     Intimate partner violence:     Fear of current or ex partner: Not on file     Emotionally abused: Not on file     Physically abused: Not on file     Forced sexual activity: Not on file   Other Topics Concern     Parent/sibling w/ CABG, MI or angioplasty before 65F 55M? Not Asked   Social History Narrative     Not on file     Patient Active Problem List   Diagnosis     Cancer of distal third of esophagus (H)     Acute pulmonary embolism (H)     Morbid obesity (H)     Tear of right supraspinatus tendon     Examination of participant in clinical trial     Esophageal obstruction     Paroxysmal A-fib (H)     Orthostatic hypotension     Chemotherapy-induced neutropenia (H)     Malnutrition (H)     G tube feedings (H)     Allergies   Allergen Reactions     Penicillin G Other (See Comments)     Pt not sure-     Penicillins Unknown       Outpatient Encounter Medications as of 7/22/2019   Medication Sig Dispense Refill     acetaminophen (TYLENOL) 325 MG tablet Take 3 tablets (975 mg) by mouth every 8 hours as needed for mild pain (Patient not taking: Reported on 7/1/2019) 100 tablet      amitriptyline (ELAVIL) 10 MG tablet Take 1 tablet (10 mg) by mouth At Bedtime 30 tablet 3     diphenoxylate-atropine (LOMOTIL) 2.5-0.025 MG tablet Take 1-2 tablets by mouth 4 times daily as needed for diarrhea (Max of 8 pills in 24 hours) 100 tablet 1     enoxaparin (LOVENOX) 100 MG/ML syringe Inject 1 mL (100 mg) Subcutaneous every 12 hours 60 Syringe 3     Etanercept 50 MG/ML SOCT Inject 50 mg Subcutaneous every 7 days Use with AutoTouch. Hold for signs of infection and seek medical attention. 4 Cartridge 5     gabapentin (NEURONTIN) 300 MG  capsule 2 tablets in the am and 3 tablets before bed 150 capsule 5     latanoprost (XALATAN) 0.005 % ophthalmic solution Place 1 drop into both eyes At Bedtime 1 Bottle 0     lidocaine-prilocaine (EMLA) cream Apply topically as needed for moderate pain 30 g 3     LORazepam (ATIVAN) 0.5 MG tablet Take 1 tablet (0.5 mg) by mouth every 4 hours as needed (Anxiety, Nausea/Vomiting or Sleep) (Patient not taking: Reported on 7/1/2019) 30 tablet 2     multivitamin, therapeutic with minerals (MULTI-VITAMIN) TABS tablet Take 1 tablet by mouth daily       oxyCODONE IR (ROXICODONE) 10 MG tablet Take 1 tablet (10 mg) by mouth every 4 hours as needed for breakthrough pain (Patient not taking: Reported on 7/1/2019) 60 tablet 0     predniSONE (DELTASONE) 2.5 MG tablet 17.5-15-12.5-10-7.5-5-2.5 mg a day each for 5 days then stop, use with 5 mg tablets 20 tablet 0     predniSONE (DELTASONE) 5 MG tablet 17.5-15-12.5-10-7.5-5-2.5 mg a day each for 5 days then stop, use with 2.5 mg tablets 60 tablet 0     timolol (TIMOPTIC) 0.5 % ophthalmic solution Place into both eyes daily       Tocilizumab 162 MG/0.9ML SOAJ Inject 162 mg Subcutaneous once a week 4 pen 3     Facility-Administered Encounter Medications as of 7/22/2019   Medication Dose Route Frequency Provider Last Rate Last Dose     heparin 100 UNIT/ML injection 5 mL  5 mL Intracatheter Q8H Loraine Sutherland PA-C   5 mL at 07/22/19 0830     lidocaine (PF) (XYLOCAINE) 1 % injection 4 mL  4 mL   Willie Wood DO   4 mL at 03/19/19 0855     sod bicarbonate-citric acid-simethicone (EZ GAS) 2.21-1.53-0.04 g packet 4 g  4 g Oral Once Justin Yao MD         triamcinolone (KENALOG-40) injection 40 mg  40 mg   Willie Wood DO   40 mg at 03/19/19 0855               His records were reviewed.    Component      Latest Ref Rng & Units 5/29/2019   RNP Antibody IgG      0.0 - 0.9 AI 0.3   Ralph KELTON Antibody IgG      0.0 - 0.9 AI <0.2   SSA (Ro) (KELTON) Antibody, IgG      0.0 - 0.9  AI <0.2   SSB (La) (KELTON) Antibody, IgG      0.0 - 0.9 AI <0.2   Scleroderma Antibody Scl-70 KELTON IgG      0.0 - 0.9 AI <0.2   Quantiferon-TB Gold Plus Result      NEG:Negative Negative   TB1 Ag minus Nil Value      IU/mL 0.00   TB2 Ag minus Nil Value      IU/mL 0.00   Mitogen minus Nil Result      IU/mL 9.13   Nil Result      IU/mL 0.03   EDELMIRA interpretation      NEG:Negative Positive (A)   EDELMIRA pattern 1       SPECKLED   EDELMIRA titer 1       1:320   Beta 2 Glycoprotein 1 Antibody IgG      <7 U/mL 0.8   Beta 2 Glycoprotein 1 Antibody IgM      <7 U/mL <0.9   Cardiolipin Antibody IgG      0.0 - 19.9 GPL-U/mL <1.6   Cardiolipin Antibody IgM      0.0 - 19.9 MPL-U/mL 0.9   Beta 2 Glycoprotein 1 Antibody IgA      <7 U/mL 4.0   Cardiolipin Antibody IgA      0.0 - 19.9 APL U/mL 1.3   Lupus Result      NEG:Negative Negative   Rheumatoid Factor      <20 IU/mL <20   Cyclic Citrullinated Peptide Antibody, IgG      <7 U/mL 1   DNA-ds      <10 IU/mL <1   CRP Inflammation      0.0 - 8.0 mg/L 4.8   Sed Rate      0 - 20 mm/h 14       Results for orders placed or performed in visit on 07/22/19   Comprehensive metabolic panel   Result Value Ref Range    Sodium 138 133 - 144 mmol/L    Potassium 4.0 3.4 - 5.3 mmol/L    Chloride 109 94 - 109 mmol/L    Carbon Dioxide 23 20 - 32 mmol/L    Anion Gap 6 3 - 14 mmol/L    Glucose 116 (H) 70 - 99 mg/dL    Urea Nitrogen 20 7 - 30 mg/dL    Creatinine 0.90 0.66 - 1.25 mg/dL    GFR Estimate >90 >60 mL/min/[1.73_m2]    GFR Estimate If Black >90 >60 mL/min/[1.73_m2]    Calcium 9.3 8.5 - 10.1 mg/dL    Bilirubin Total 0.6 0.2 - 1.3 mg/dL    Albumin 4.0 3.4 - 5.0 g/dL    Protein Total 7.3 6.8 - 8.8 g/dL    Alkaline Phosphatase 97 40 - 150 U/L    ALT 29 0 - 70 U/L    AST 13 0 - 45 U/L   TSH with free T4 reflex   Result Value Ref Range    TSH 1.05 0.40 - 4.00 mU/L               Ph.E:    Reviewed vital signs.    Constitutional: WD/WN. Pleasant. In no acute distress. Sister present.  Eyes: EOM intact, PERRLA,  sclera anicteric, conj not injected  HEENT: No oral ulcers or thrush. Normal salivary pool.  Neck: No cervical LAP or thyromegaly  Chest: Clear to auscultation bilaterally  CV: RRR, no murmurs/ rubs or gallops. Trace LE edema.   GI: Abdomen is soft and non tender.   MS: No synovitis. Cool joints. No tenderness of the joints. No  joint deformities. Full ROM of the joints. No nodules. No Jaccoud's deformity. ROM of R shoulder is no longer painful.  Skin: Hyperpigmentation over shins suggestive of stasis dermatitis, purple discoloration/purpuric skin changes over LEs (chronic per pt)  Neuro: A&O x 3. Grossly non focal, muscular power 5/5 in all ext  Psych: NL affect and mood    Assessment/ plan:    Inflammatory arthritis included by check point inhibitor Tx/seroneg RA. Positive EDELMIRA could be due to malignancy, no other signs of CTD. Discussed Dx and Tx. On prednisone 20 mg every day since initial visit 5/2109 with great response. Tried to start actemra as steroid sparing agent of choice per current data but unfortunately his insurance declined coverage, so we started him on enbrel (anti-TNF) ad he is s/p 4 shots. IA is under excellent control and we started to taper his prednisone 2 days ago from 20 mg every day with plan of tapering off prednisone and see if enbrel is able to control arthritis, if it fails, will submit to insurance for actemra again.    Today's plan:      Prednisone 17.5-15-12.5-10-7.5-5-2.5 mg a day each for 5 days then stop    Continue weekly enbrel; risks were discussed. Was advised to call if any infections and hold enbrel with an infection    RTC in 3 months (I could see him during one of the infusions as well if I happen to be here on a Tuesday)    Asked him to call if arthritis flares up by tapering prednisone down      Michelle Singer MD

## 2019-07-22 NOTE — PROGRESS NOTES
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 His tumor is positive for cytokeratin 20, negative for P63 and CK7, and HER2 is negative. He started on FOLFOX (5FU/oxaliplatin) on 5/15/2017. He had a delay in treatment from 9/5-10/2/17 due to work related injury.    He had an excellent response by imaging throughout the summer 2017.    He a mixed response by imaging in late fall 2017.    In January 2018, he was switched to taxol and cyramza - had slight progression and neuropathy and clinical trial became available.       In March 2018, he was started on the [facility] Match Clinical Trial with crizotinib.  (MET amplification) He remained on crizotinib from March - July 2018.    August 2018, he was switched back to taxol/cramza.  In October, he was switched to Keytruda (PD1 overexpressor).    In April 2019, his infusion was held for three weeks, and he was treated with prednisone for grade 3 arthralgias.    INTERVAL HISTORY: Levi comes in today for followup. He is now on Keytruda (resumed 5/20).  His last CT showed continued response which is encouraging however off the prednisone, his arthralgias started to come back.  He was restarted on 20 mg of prednisone daily and Enbrel- since then the arthralgias are mild/stable in the hands/shoulders. Strength is better in the left hand >right.  He feels he is able to do all his ADLs and still is doing daily workouts.  He might be a little better- but definitely not worse.      He has had no issues with fevers or chills, no chest pain or shortness of breath, no nausea, vomiting, or constipation.  He uses lomotil 3-4 times in the last two weeks.  He continues to alternate with normal stools and occasional diarrhea, seems to be provoked by a big meal.       He has no abdminal  "pain.  No n/v/d/c.       His energy levels are good.  Mood is good. Sleep is good on amitriptyline.     No dysphagia.        ROS: 10 point ROS neg other than the symptoms noted above in the HPI.    PHYSICAL EXAMINATION  /90   Pulse 91   Temp 97.9  F (36.6  C)   Resp 16   Ht 1.981 m (6' 6\")   Wt (!) 154.2 kg (340 lb)   SpO2 94%   BMI 39.29 kg/m     Wt Readings from Last 4 Encounters:   07/22/19 (!) 154.2 kg (340 lb)   07/01/19 (!) 153.3 kg (338 lb)   06/11/19 (!) 152.7 kg (336 lb 9.6 oz)   05/29/19 (!) 151.7 kg (334 lb 6.4 oz)     Constitutional: Alert, oriented male in no visible distress.  Eyes: PERRL. Anicteric sclerae.  ENT/Mouth: OM moist and pink without lesions or thrush.  CV: RRR  Resp: CTAB throughout  Abdomen: Soft, non-tender, non-distended. Obese. Bowel sounds present. Unable to palpate liver or spleen.  No RLQ tenderness.  Extremities: trace edema , no erythema or warmth over joints  Skin: Warm, dry.   Lymph: No cervical or supraclavicular lymphadenopathy appreciated.   Neuro: CN II-XII grossly intact.     MUSC: no erythema or warmth over shoulders, DIPs, PIPs.  Hand strength normal.  Thenar wasting improved R hand  ECOG PS: 1          6/11/2019 07:07 7/1/2019 11:15 7/22/2019 08:23   WBC 6.6 5.2 6.8   Hemoglobin 13.6 14.2 15.2   Hematocrit 41.3 42.1 45.4   Platelet Count 145 (L) 144 (L) 136 (L)   RBC Count 4.42 4.66 4.97   MCV 93 90 91      7/22/2019 08:23   Sodium 138   Potassium 4.0   Chloride 109   Carbon Dioxide 23   Urea Nitrogen 20   Creatinine 0.90   GFR Estimate >90   GFR Estimate If Black >90   Calcium 9.3   Anion Gap 6   Albumin 4.0   Protein Total 7.3   Bilirubin Total 0.6   Alkaline Phosphatase 97   ALT 29   AST 13   TSH 1.05   Glucose 116 (H)     IMPRESSION/PLAN:  1. Metastatic esophogeal adenocarcinoma. He has had treatment with FOLFOX and then transitioned to Taxol with ramicurimab.  He was then on crizotinib on the [employer] Match study.  He progressed on this and returned on " taxol/cyramza. And is now on Keytruda.  Levi has had a great response radiographically to the Keytruda, however did develop a grade 3 arthralgias in the hands/shoulders.  These responded well to burst of steroids and 6 weeks off of treatment. The arthralgias did reoccur with restarting Keytruda on 5/20 and he now is back on 20 mg of prednisone and seeing rheumatology and given Enbrel.      Continue Keytruda today as arthralgias remain a grade 1 toxicity.  Will plan to repeat imaging in august.    Will continue close follow up.       Rheum: see prior notes.  Was on prednisone 20 mg daily and Enbrel.  Rheum now decreasing the prednisone- down to 17.5 and will decrease by 2.5 mg every 5 days, per Rheum.   Given the decreasing pred dose, we will not need pcp ppx.     HEME: A history of PE.  He is on Lovenox twice daily-admits he is not being compliant- but will start.  He has progressed through Xarelto in the past for non compliance as well. No bleeding issues.  If his weight continues to climb we will need to recheck a Xa on him.  He didn't take his shot this AM..     Neuro: He does have baseline neuropathy and is on gabapentin 600/300/600 mg daily.   This is stable      ID: Appendicitis noted on multiple CT scans - No symptoms of elevated WBC, fever or pain.  On imaging this is stable.  It is likely related to Keytruda.    SLEEP: insomnia related to steroids, amitriptyline helping, not needing ativan or Ambien.      Nicole Sutherland PA-C

## 2019-07-22 NOTE — PROGRESS NOTES
Rheumatology F/U Note    Reason for visit: F/U inflammatory arthritis sec check point inhibitor immunotherapy    Initial visit date: 5/29/2019    DOS: 7/22/2019       HPI:    Reuben Padilla is a 58 year old  male who was referred to our clinic for evaluation and management of his check point inhibitor induced inflammatory arthritis.    Got diagnosed with metastatic esophageal cancer (mets in the liver) in 2017. He was put on one of check point inhibitors Keytruda (PD1 overexpressor) in 10/2018. Developed grade 3 musculoskeletal toxicity from immunotherapy. In April 2019, his infusion was held for three weeks, and he was treated with prednisone for grade 3 arthralgias. It involved hands, R shoulder and R arm pain.  Had pain, stiffness and poor , no joint swelling. He came off the drug, was treated with prednsione 80 mg max every day taper which helped. He is off prednisone now and back to keytruda (s/p one infusion) as was very effective for the cancer. He already started to notice recurrence of arthralgia and is afraid that it gets worse over course of TX which is essential to keep his cancer under excellent control. He gets the drug every 3 weeks.    Today, has h/o shoulder ache, but no hand arthritis.    No personal hx of psoraisis or fh/o psoriasis. There is fh/o OA. No fh/o RA, autoimmune disorders. Has a 26 yo son. 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      Has neuropathy in his legs from mid shin to ankles like numbness.      Has dry mouth during sleep. Gained weight on prednisone.     Interval Hx 7/22/2019: Levi was seen at [facility] during Keytruda infusion. He had an oncology visit today, plan is to re-scan in Aug 2019. Doing very well, has no complaints. Is on Enbrel s/p 4 inj, no SEs. Started tapering his prednisone from 20 mg every day, now on 17.5 mg every day since 7/20/2019. No joint pain (onlt 1/10 pain if he tries hard to make a full fist) or swelling or  stiffness.       ROS:  A comprehensive ROS was done, positives are per HPI.        Past Medical History:   Diagnosis Date     Arrhythmia      Cancer (H)     esophageal     Glaucoma      Hypertension      Paroxysmal A-fib (H)      Tubular adenoma 2017     Past Surgical History:   Procedure Laterality Date     AMPUTATION      toe     ARTHROSCOPY KNEE       bunion surgery       CHOLECYSTECTOMY       COLONOSCOPY  2017    remove 2 polyps     ESOPHAGOSCOPY, GASTROSCOPY, DUODENOSCOPY (EGD), COMBINED N/A 10/10/2018    Procedure: COMBINED ESOPHAGOSCOPY, GASTROSCOPY, DUODENOSCOPY (EGD);  Esophagogastroduodenoscopy ;  Surgeon: Leonel Joel MD;  Location: [facility] OR     INSERT PORT VASCULAR ACCESS Right 2017    Procedure: INSERT PORT VASCULAR ACCESS;  Single Lumen Chest Power Port;  Surgeon: Jasiel Hu PA-C;  Location:  OR     FHx: No fhx of RA  Social History     Socioeconomic History     Marital status: Single     Spouse name: Not on file     Number of children: Not on file     Years of education: Not on file     Highest education level: Not on file   Occupational History     Not on file   Social Needs     Financial resource strain: Not on file     Food insecurity:     Worry: Not on file     Inability: Not on file     Transportation needs:     Medical: Not on file     Non-medical: Not on file   Tobacco Use     Smoking status: Former Smoker     Packs/day: 1.00     Years: 20.00     Pack years: 20.00     Types: Cigarettes     Last attempt to quit: 2006     Years since quittin.5     Smokeless tobacco: Never Used   Substance and Sexual Activity     Alcohol use: Yes     Comment: occasional     Drug use: No     Comment: h/o over 30 years ago     Sexual activity: Not on file   Lifestyle     Physical activity:     Days per week: Not on file     Minutes per session: Not on file     Stress: Not on file   Relationships     Social connections:     Talks on phone: Not on file     Gets together: Not  on file     Attends Mandaen service: Not on file     Active member of club or organization: Not on file     Attends meetings of clubs or organizations: Not on file     Relationship status: Not on file     Intimate partner violence:     Fear of current or ex partner: Not on file     Emotionally abused: Not on file     Physically abused: Not on file     Forced sexual activity: Not on file   Other Topics Concern     Parent/sibling w/ CABG, MI or angioplasty before 65F 55M? Not Asked   Social History Narrative     Not on file     Patient Active Problem List   Diagnosis     Cancer of distal third of esophagus (H)     Acute pulmonary embolism (H)     Morbid obesity (H)     Tear of right supraspinatus tendon     Examination of participant in clinical trial     Esophageal obstruction     Paroxysmal A-fib (H)     Orthostatic hypotension     Chemotherapy-induced neutropenia (H)     Malnutrition (H)     G tube feedings (H)     Allergies   Allergen Reactions     Penicillin G Other (See Comments)     Pt not sure-     Penicillins Unknown       Outpatient Encounter Medications as of 7/22/2019   Medication Sig Dispense Refill     acetaminophen (TYLENOL) 325 MG tablet Take 3 tablets (975 mg) by mouth every 8 hours as needed for mild pain (Patient not taking: Reported on 7/1/2019) 100 tablet      amitriptyline (ELAVIL) 10 MG tablet Take 1 tablet (10 mg) by mouth At Bedtime 30 tablet 3     diphenoxylate-atropine (LOMOTIL) 2.5-0.025 MG tablet Take 1-2 tablets by mouth 4 times daily as needed for diarrhea (Max of 8 pills in 24 hours) 100 tablet 1     enoxaparin (LOVENOX) 100 MG/ML syringe Inject 1 mL (100 mg) Subcutaneous every 12 hours 60 Syringe 3     Etanercept 50 MG/ML SOCT Inject 50 mg Subcutaneous every 7 days Use with AutoTouch. Hold for signs of infection and seek medical attention. 4 Cartridge 5     gabapentin (NEURONTIN) 300 MG capsule 2 tablets in the am and 3 tablets before bed 150 capsule 5     latanoprost (XALATAN) 0.005  % ophthalmic solution Place 1 drop into both eyes At Bedtime 1 Bottle 0     lidocaine-prilocaine (EMLA) cream Apply topically as needed for moderate pain 30 g 3     LORazepam (ATIVAN) 0.5 MG tablet Take 1 tablet (0.5 mg) by mouth every 4 hours as needed (Anxiety, Nausea/Vomiting or Sleep) (Patient not taking: Reported on 7/1/2019) 30 tablet 2     multivitamin, therapeutic with minerals (MULTI-VITAMIN) TABS tablet Take 1 tablet by mouth daily       oxyCODONE IR (ROXICODONE) 10 MG tablet Take 1 tablet (10 mg) by mouth every 4 hours as needed for breakthrough pain (Patient not taking: Reported on 7/1/2019) 60 tablet 0     predniSONE (DELTASONE) 2.5 MG tablet 17.5-15-12.5-10-7.5-5-2.5 mg a day each for 5 days then stop, use with 5 mg tablets 20 tablet 0     predniSONE (DELTASONE) 5 MG tablet 17.5-15-12.5-10-7.5-5-2.5 mg a day each for 5 days then stop, use with 2.5 mg tablets 60 tablet 0     timolol (TIMOPTIC) 0.5 % ophthalmic solution Place into both eyes daily       Tocilizumab 162 MG/0.9ML SOAJ Inject 162 mg Subcutaneous once a week 4 pen 3     Facility-Administered Encounter Medications as of 7/22/2019   Medication Dose Route Frequency Provider Last Rate Last Dose     heparin 100 UNIT/ML injection 5 mL  5 mL Intracatheter Q8H Loraine Sutherland PA-C   5 mL at 07/22/19 0830     lidocaine (PF) (XYLOCAINE) 1 % injection 4 mL  4 mL   Willie Wood DO   4 mL at 03/19/19 0855     sod bicarbonate-citric acid-simethicone (EZ GAS) 2.21-1.53-0.04 g packet 4 g  4 g Oral Once Justin Yao MD         triamcinolone (KENALOG-40) injection 40 mg  40 mg   Willie Wood DO   40 mg at 03/19/19 0855               His records were reviewed.    Component      Latest Ref Rng & Units 5/29/2019   RNP Antibody IgG      0.0 - 0.9 AI 0.3   Ralph KELTON Antibody IgG      0.0 - 0.9 AI <0.2   SSA (Ro) (KELTON) Antibody, IgG      0.0 - 0.9 AI <0.2   SSB (La) (KELTON) Antibody, IgG      0.0 - 0.9 AI <0.2   Scleroderma Antibody Scl-70 KELTON  IgG      0.0 - 0.9 AI <0.2   Quantiferon-TB Gold Plus Result      NEG:Negative Negative   TB1 Ag minus Nil Value      IU/mL 0.00   TB2 Ag minus Nil Value      IU/mL 0.00   Mitogen minus Nil Result      IU/mL 9.13   Nil Result      IU/mL 0.03   EDELMIRA interpretation      NEG:Negative Positive (A)   EDELMIRA pattern 1       SPECKLED   EDELMIRA titer 1       1:320   Beta 2 Glycoprotein 1 Antibody IgG      <7 U/mL 0.8   Beta 2 Glycoprotein 1 Antibody IgM      <7 U/mL <0.9   Cardiolipin Antibody IgG      0.0 - 19.9 GPL-U/mL <1.6   Cardiolipin Antibody IgM      0.0 - 19.9 MPL-U/mL 0.9   Beta 2 Glycoprotein 1 Antibody IgA      <7 U/mL 4.0   Cardiolipin Antibody IgA      0.0 - 19.9 APL U/mL 1.3   Lupus Result      NEG:Negative Negative   Rheumatoid Factor      <20 IU/mL <20   Cyclic Citrullinated Peptide Antibody, IgG      <7 U/mL 1   DNA-ds      <10 IU/mL <1   CRP Inflammation      0.0 - 8.0 mg/L 4.8   Sed Rate      0 - 20 mm/h 14       Results for orders placed or performed in visit on 07/22/19   Comprehensive metabolic panel   Result Value Ref Range    Sodium 138 133 - 144 mmol/L    Potassium 4.0 3.4 - 5.3 mmol/L    Chloride 109 94 - 109 mmol/L    Carbon Dioxide 23 20 - 32 mmol/L    Anion Gap 6 3 - 14 mmol/L    Glucose 116 (H) 70 - 99 mg/dL    Urea Nitrogen 20 7 - 30 mg/dL    Creatinine 0.90 0.66 - 1.25 mg/dL    GFR Estimate >90 >60 mL/min/[1.73_m2]    GFR Estimate If Black >90 >60 mL/min/[1.73_m2]    Calcium 9.3 8.5 - 10.1 mg/dL    Bilirubin Total 0.6 0.2 - 1.3 mg/dL    Albumin 4.0 3.4 - 5.0 g/dL    Protein Total 7.3 6.8 - 8.8 g/dL    Alkaline Phosphatase 97 40 - 150 U/L    ALT 29 0 - 70 U/L    AST 13 0 - 45 U/L   TSH with free T4 reflex   Result Value Ref Range    TSH 1.05 0.40 - 4.00 mU/L               Ph.E:    Reviewed vital signs.    Constitutional: WD/WN. Pleasant. In no acute distress. Sister present.  Eyes: EOM intact, PERRLA, sclera anicteric, conj not injected  HEENT: No oral ulcers or thrush. Normal salivary pool.  Neck:  No cervical LAP or thyromegaly  Chest: Clear to auscultation bilaterally  CV: RRR, no murmurs/ rubs or gallops. Trace LE edema.   GI: Abdomen is soft and non tender.   MS: No synovitis. Cool joints. No tenderness of the joints. No  joint deformities. Full ROM of the joints. No nodules. No Jaccoud's deformity. ROM of R shoulder is no longer painful.  Skin: Hyperpigmentation over shins suggestive of stasis dermatitis, purple discoloration/purpuric skin changes over LEs (chronic per pt)  Neuro: A&O x 3. Grossly non focal, muscular power 5/5 in all ext  Psych: NL affect and mood    Assessment/ plan:    Inflammatory arthritis included by check point inhibitor Tx/seroneg RA. Positive EDELMIRA could be due to malignancy, no other signs of CTD. Discussed Dx and Tx. On prednisone 20 mg every day since initial visit 5/2109 with great response. Tried to start actemra as steroid sparing agent of choice per current data but unfortunately his insurance declined coverage, so we started him on enbrel (anti-TNF) ad he is s/p 4 shots. IA is under excellent control and we started to taper his prednisone 2 days ago from 20 mg every day with plan of tapering off prednisone and see if enbrel is able to control arthritis, if it fails, will submit to insurance for actemra again.    Today's plan:      Prednisone 17.5-15-12.5-10-7.5-5-2.5 mg a day each for 5 days then stop    Continue weekly enbrel; risks were discussed. Was advised to call if any infections and hold enbrel with an infection    RTC in 3 months (I could see him during one of the infusions as well if I happen to be here on a Tuesday)    Asked him to call if arthritis flares up by tapering prednisone down

## 2019-07-24 ENCOUNTER — TELEPHONE (OUTPATIENT)
Dept: ONCOLOGY | Facility: CLINIC | Age: 59
End: 2019-07-24

## 2019-07-24 NOTE — TELEPHONE ENCOUNTER
LVM regarding Levi's call to me yesterday to speak about his schedule, a [employer]ora message was also sent. Please transfer him to Jeanne, should he call back - [phone], and lync me for my extension.

## 2019-08-06 DIAGNOSIS — M06.00 SERONEGATIVE RHEUMATOID ARTHRITIS (H): ICD-10-CM

## 2019-08-06 DIAGNOSIS — G62.0 CHEMOTHERAPY-INDUCED NEUROPATHY (H): ICD-10-CM

## 2019-08-06 DIAGNOSIS — T45.1X5A CHEMOTHERAPY-INDUCED NEUROPATHY (H): ICD-10-CM

## 2019-08-06 RX ORDER — GABAPENTIN 300 MG/1
CAPSULE ORAL
Qty: 150 CAPSULE | Refills: 5 | OUTPATIENT
Start: 2019-08-06

## 2019-08-06 NOTE — TELEPHONE ENCOUNTER
[facility] Call Center    Phone Message    May a detailed message be left on voicemail: yes    Reason for Call: Other: Pt called duyen nd wanted to schedule a follow up with provider. Pt stated that last time provider came to see him in another appointment. Pt will be at the [facility] on 9/3 and is hoping to see provider then. Writer did inform pt that there are no available appointments that day. Please follow up witpt when available. Thank you     Action Taken: Message routed to:  [facility] ([facility]): Rheum

## 2019-08-07 RX ORDER — GABAPENTIN 300 MG/1
CAPSULE ORAL
Qty: 150 CAPSULE | Refills: 5 | Status: SHIPPED | OUTPATIENT
Start: 2019-08-07 | End: 2020-01-27

## 2019-08-09 NOTE — TELEPHONE ENCOUNTER
Discussed with Dr. Singer, she will see him on Sept 3rd.     Pt is weaning off of prednisone, he is currently on 10 mg a day, and is noticing that he is having increased stiffness in his hands, it started when he decreased to 12.5 and now at 10 mg a day, stiffness is lasting all day and there is pain when he makes a fist.  He is due for Enbrel today, this will be his 7th dose.  He is wondering what he should do at this time, as it is starting to be come bothersome.  Will discuss with Dr. Singer and then call him back.    Sweetie Hernandez RN  Rheumatology Clinic

## 2019-08-09 NOTE — TELEPHONE ENCOUNTER
[facility] Call Center    Phone Message    May a detailed message be left on voicemail: yes    Reason for Call: Other: Pt called in and stated that he has been waiting a long time to hear back from someone. 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 Please follow up with pt as soon as available. Thank you    Action Taken: Message routed to:  [facility] ([facility]): Rheum

## 2019-08-09 NOTE — TELEPHONE ENCOUNTER
Called and spoke with pt and let him know that Tuesdays are not Dr. Singer's normal days in clinic and that she needs to check her schedule to make sure she can see him.  I will call him on Monday or Tuesday and let him know if she is able to make it work.  Pt understands.    Sweetie Hernandez RN  Rheumatology Clinic

## 2019-08-09 NOTE — TELEPHONE ENCOUNTER
[facility] Call Center    Phone Message    May a detailed message be left on voicemail: yes    Reason for Call: Symptoms or Concerns     If patient has red-flag symptoms, warm transfer to triage line    Current symptom or concern: Patient stated he has been waiting for a call back from care team since 08/06 and hasn't received one in regard to being scheduled. Requested that writer let the care team know if they can't bother to give him a call back then he will find another doctor. Please advise.       Action Taken: Message routed to:  [facility] ([facility]): Rheumatology

## 2019-08-09 NOTE — TELEPHONE ENCOUNTER
Michelle Singer MD Beard, Sweetie, RN   Caller: Unspecified (3 days ago,  3:24 PM)             He should go back to 12.5 mg a day and we should submit for actemra again and say he is failing enbrel, meanwhile should remain on enbrel.      Called and relayed message to pt, discussed Dr. Singer's recommendation, he is fine with that.  We will need to re-order Actemra to try to get it approved.  Pt does not know if Enbrel is working at all, but understands that we are working to get another medication approved.  Does need a refill on his prednisone.    Sweetie Hernandez RN  Rheumatology Clinic

## 2019-08-11 RX ORDER — PREDNISONE 10 MG/1
10 TABLET ORAL DAILY
Qty: 30 TABLET | Refills: 1 | Status: SHIPPED | OUTPATIENT
Start: 2019-08-11 | End: 2019-09-03

## 2019-08-11 RX ORDER — PREDNISONE 2.5 MG/1
TABLET ORAL
Qty: 30 TABLET | Refills: 0 | Status: SHIPPED | OUTPATIENT
Start: 2019-08-11 | End: 2019-09-03

## 2019-08-12 ENCOUNTER — TELEPHONE (OUTPATIENT)
Dept: RHEUMATOLOGY | Facility: CLINIC | Age: 59
End: 2019-08-12

## 2019-08-12 NOTE — TELEPHONE ENCOUNTER
PRIOR AUTHORIZATION DENIED    Medication: ACTEMRA - Denied     Denial Date: 8/12/2019    Denial Rational:  Patient has not tried and failed, Humira, Kevzara, Orencia or Xeljanz     Appeal Information: Can appeal OR change drug

## 2019-08-12 NOTE — TELEPHONE ENCOUNTER
PA Initiation    Medication: ACTEMRA   Insurance Company: [employer] - Phone [phone] Fax [phone]  Pharmacy Filling the Rx: [facility] - [place], [place] - [address] [address]  Filling Pharmacy Phone:    Filling Pharmacy Fax:    Start Date: 8/12/2019

## 2019-08-13 ENCOUNTER — ONCOLOGY VISIT (OUTPATIENT)
Dept: ONCOLOGY | Facility: CLINIC | Age: 59
End: 2019-08-13
Attending: PHYSICIAN ASSISTANT
Payer: COMMERCIAL

## 2019-08-13 ENCOUNTER — TELEPHONE (OUTPATIENT)
Dept: RHEUMATOLOGY | Facility: CLINIC | Age: 59
End: 2019-08-13

## 2019-08-13 ENCOUNTER — INFUSION THERAPY VISIT (OUTPATIENT)
Dept: ONCOLOGY | Facility: CLINIC | Age: 59
End: 2019-08-13
Attending: INTERNAL MEDICINE
Payer: COMMERCIAL

## 2019-08-13 ENCOUNTER — APPOINTMENT (OUTPATIENT)
Dept: LAB | Facility: CLINIC | Age: 59
End: 2019-08-13
Attending: INTERNAL MEDICINE
Payer: COMMERCIAL

## 2019-08-13 ENCOUNTER — ANCILLARY PROCEDURE (OUTPATIENT)
Dept: CT IMAGING | Facility: CLINIC | Age: 59
End: 2019-08-13
Attending: PHYSICIAN ASSISTANT
Payer: COMMERCIAL

## 2019-08-13 VITALS
SYSTOLIC BLOOD PRESSURE: 115 MMHG | TEMPERATURE: 97.8 F | DIASTOLIC BLOOD PRESSURE: 80 MMHG | RESPIRATION RATE: 22 BRPM | BODY MASS INDEX: 40.31 KG/M2 | HEART RATE: 71 BPM | OXYGEN SATURATION: 97 % | WEIGHT: 315 LBS

## 2019-08-13 DIAGNOSIS — I26.99 OTHER ACUTE PULMONARY EMBOLISM WITHOUT ACUTE COR PULMONALE (H): Primary | ICD-10-CM

## 2019-08-13 DIAGNOSIS — C15.5 CANCER OF DISTAL THIRD OF ESOPHAGUS (H): Primary | ICD-10-CM

## 2019-08-13 DIAGNOSIS — C16.9 METASTASIS FROM GASTRIC CANCER (H): ICD-10-CM

## 2019-08-13 DIAGNOSIS — C79.9 METASTASIS FROM GASTRIC CANCER (H): ICD-10-CM

## 2019-08-13 DIAGNOSIS — D70.1 CHEMOTHERAPY-INDUCED NEUTROPENIA (H): ICD-10-CM

## 2019-08-13 DIAGNOSIS — T45.1X5A CHEMOTHERAPY-INDUCED NEUTROPENIA (H): ICD-10-CM

## 2019-08-13 PROBLEM — C78.7 MALIGNANT NEOPLASM METASTATIC TO LIVER (H): Status: ACTIVE | Noted: 2017-12-15

## 2019-08-13 PROBLEM — E46 MALNUTRITION (H): Status: RESOLVED | Noted: 2018-11-05 | Resolved: 2019-08-13

## 2019-08-13 PROBLEM — S22.009A: Status: ACTIVE | Noted: 2017-09-12

## 2019-08-13 PROBLEM — Q39.3 CONGENITAL STENOSIS OF ESOPHAGUS: Status: ACTIVE | Noted: 2017-12-04

## 2019-08-13 PROBLEM — E66.01 MORBID OBESITY (H): Status: RESOLVED | Noted: 2017-12-26 | Resolved: 2019-08-13

## 2019-08-13 LAB
ALBUMIN SERPL-MCNC: 3.9 G/DL (ref 3.4–5)
ALP SERPL-CCNC: 64 U/L (ref 40–150)
ALT SERPL W P-5'-P-CCNC: 27 U/L (ref 0–70)
ANION GAP SERPL CALCULATED.3IONS-SCNC: 6 MMOL/L (ref 3–14)
AST SERPL W P-5'-P-CCNC: 12 U/L (ref 0–45)
BASOPHILS # BLD AUTO: 0 10E9/L (ref 0–0.2)
BASOPHILS NFR BLD AUTO: 0.6 %
BILIRUB SERPL-MCNC: 0.4 MG/DL (ref 0.2–1.3)
BUN SERPL-MCNC: 26 MG/DL (ref 7–30)
CALCIUM SERPL-MCNC: 8.6 MG/DL (ref 8.5–10.1)
CHLORIDE SERPL-SCNC: 109 MMOL/L (ref 94–109)
CO2 SERPL-SCNC: 26 MMOL/L (ref 20–32)
CREAT SERPL-MCNC: 1.07 MG/DL (ref 0.66–1.25)
DIFFERENTIAL METHOD BLD: NORMAL
EOSINOPHIL # BLD AUTO: 0.1 10E9/L (ref 0–0.7)
EOSINOPHIL NFR BLD AUTO: 0.9 %
ERYTHROCYTE [DISTWIDTH] IN BLOOD BY AUTOMATED COUNT: 14.2 % (ref 10–15)
GFR SERPL CREATININE-BSD FRML MDRD: 76 ML/MIN/{1.73_M2}
GLUCOSE SERPL-MCNC: 94 MG/DL (ref 70–99)
HCT VFR BLD AUTO: 44 % (ref 40–53)
HGB BLD-MCNC: 14.4 G/DL (ref 13.3–17.7)
IMM GRANULOCYTES # BLD: 0 10E9/L (ref 0–0.4)
IMM GRANULOCYTES NFR BLD: 0.4 %
LYMPHOCYTES # BLD AUTO: 2.5 10E9/L (ref 0.8–5.3)
LYMPHOCYTES NFR BLD AUTO: 36.5 %
MCH RBC QN AUTO: 30.3 PG (ref 26.5–33)
MCHC RBC AUTO-ENTMCNC: 32.7 G/DL (ref 31.5–36.5)
MCV RBC AUTO: 92 FL (ref 78–100)
MONOCYTES # BLD AUTO: 0.5 10E9/L (ref 0–1.3)
MONOCYTES NFR BLD AUTO: 6.9 %
NEUTROPHILS # BLD AUTO: 3.8 10E9/L (ref 1.6–8.3)
NEUTROPHILS NFR BLD AUTO: 54.7 %
NRBC # BLD AUTO: 0 10*3/UL
NRBC BLD AUTO-RTO: 0 /100
PLATELET # BLD AUTO: 170 10E9/L (ref 150–450)
POTASSIUM SERPL-SCNC: 3.8 MMOL/L (ref 3.4–5.3)
PROT SERPL-MCNC: 7.3 G/DL (ref 6.8–8.8)
RBC # BLD AUTO: 4.76 10E12/L (ref 4.4–5.9)
SODIUM SERPL-SCNC: 141 MMOL/L (ref 133–144)
TSH SERPL DL<=0.005 MIU/L-ACNC: 3.12 MU/L (ref 0.4–4)
WBC # BLD AUTO: 6.9 10E9/L (ref 4–11)

## 2019-08-13 PROCEDURE — 84443 ASSAY THYROID STIM HORMONE: CPT | Performed by: INTERNAL MEDICINE

## 2019-08-13 PROCEDURE — 80053 COMPREHEN METABOLIC PANEL: CPT | Performed by: INTERNAL MEDICINE

## 2019-08-13 PROCEDURE — 25000128 H RX IP 250 OP 636: Mod: ZF | Performed by: PHYSICIAN ASSISTANT

## 2019-08-13 PROCEDURE — 85025 COMPLETE CBC W/AUTO DIFF WBC: CPT | Performed by: INTERNAL MEDICINE

## 2019-08-13 PROCEDURE — G0463 HOSPITAL OUTPT CLINIC VISIT: HCPCS | Mod: ZF

## 2019-08-13 PROCEDURE — 99214 OFFICE O/P EST MOD 30 MIN: CPT | Mod: ZP | Performed by: PHYSICIAN ASSISTANT

## 2019-08-13 PROCEDURE — 96413 CHEMO IV INFUSION 1 HR: CPT

## 2019-08-13 PROCEDURE — 25800030 ZZH RX IP 258 OP 636: Mod: ZF | Performed by: PHYSICIAN ASSISTANT

## 2019-08-13 RX ORDER — EPINEPHRINE 1 MG/ML
0.3 INJECTION, SOLUTION INTRAMUSCULAR; SUBCUTANEOUS EVERY 5 MIN PRN
Status: CANCELLED | OUTPATIENT
Start: 2019-08-13

## 2019-08-13 RX ORDER — ALBUTEROL SULFATE 0.83 MG/ML
2.5 SOLUTION RESPIRATORY (INHALATION)
Status: CANCELLED | OUTPATIENT
Start: 2019-08-13

## 2019-08-13 RX ORDER — HEPARIN SODIUM (PORCINE) LOCK FLUSH IV SOLN 100 UNIT/ML 100 UNIT/ML
500 SOLUTION INTRAVENOUS ONCE
Status: COMPLETED | OUTPATIENT
Start: 2019-08-13 | End: 2019-08-13

## 2019-08-13 RX ORDER — SODIUM CHLORIDE 9 MG/ML
1000 INJECTION, SOLUTION INTRAVENOUS CONTINUOUS PRN
Status: CANCELLED
Start: 2019-08-13

## 2019-08-13 RX ORDER — EPINEPHRINE 0.3 MG/.3ML
0.3 INJECTION SUBCUTANEOUS EVERY 5 MIN PRN
Status: CANCELLED | OUTPATIENT
Start: 2019-08-13

## 2019-08-13 RX ORDER — MEPERIDINE HYDROCHLORIDE 25 MG/ML
25 INJECTION INTRAMUSCULAR; INTRAVENOUS; SUBCUTANEOUS EVERY 30 MIN PRN
Status: CANCELLED | OUTPATIENT
Start: 2019-08-13

## 2019-08-13 RX ORDER — DIPHENHYDRAMINE HYDROCHLORIDE 50 MG/ML
50 INJECTION INTRAMUSCULAR; INTRAVENOUS
Status: CANCELLED
Start: 2019-08-13

## 2019-08-13 RX ORDER — HEPARIN SODIUM (PORCINE) LOCK FLUSH IV SOLN 100 UNIT/ML 100 UNIT/ML
5 SOLUTION INTRAVENOUS EVERY 8 HOURS
Status: DISCONTINUED | OUTPATIENT
Start: 2019-08-13 | End: 2019-08-14 | Stop reason: HOSPADM

## 2019-08-13 RX ORDER — HEPARIN SODIUM (PORCINE) LOCK FLUSH IV SOLN 100 UNIT/ML 100 UNIT/ML
500 SOLUTION INTRAVENOUS ONCE
Status: CANCELLED | OUTPATIENT
Start: 2019-08-13

## 2019-08-13 RX ORDER — METHYLPREDNISOLONE SODIUM SUCCINATE 125 MG/2ML
125 INJECTION, POWDER, LYOPHILIZED, FOR SOLUTION INTRAMUSCULAR; INTRAVENOUS
Status: CANCELLED
Start: 2019-08-13

## 2019-08-13 RX ORDER — IOPAMIDOL 755 MG/ML
135 INJECTION, SOLUTION INTRAVASCULAR ONCE
Status: COMPLETED | OUTPATIENT
Start: 2019-08-13 | End: 2019-08-13

## 2019-08-13 RX ORDER — ALBUTEROL SULFATE 90 UG/1
1-2 AEROSOL, METERED RESPIRATORY (INHALATION)
Status: CANCELLED
Start: 2019-08-13

## 2019-08-13 RX ORDER — LORAZEPAM 2 MG/ML
0.5 INJECTION INTRAMUSCULAR EVERY 4 HOURS PRN
Status: CANCELLED
Start: 2019-08-13

## 2019-08-13 RX ORDER — CLINDAMYCIN HCL 300 MG
CAPSULE ORAL
Refills: 0 | COMMUNITY
Start: 2019-08-07 | End: 2019-09-03

## 2019-08-13 RX ADMIN — HEPARIN SODIUM (PORCINE) LOCK FLUSH IV SOLN 100 UNIT/ML 500 UNITS: 100 SOLUTION at 08:46

## 2019-08-13 RX ADMIN — SODIUM CHLORIDE 250 ML: 9 INJECTION, SOLUTION INTRAVENOUS at 13:18

## 2019-08-13 RX ADMIN — SODIUM CHLORIDE 200 MG: 9 INJECTION, SOLUTION INTRAVENOUS at 13:18

## 2019-08-13 RX ADMIN — HEPARIN SODIUM (PORCINE) LOCK FLUSH IV SOLN 100 UNIT/ML 5 ML: 100 SOLUTION at 08:19

## 2019-08-13 RX ADMIN — IOPAMIDOL 135 ML: 755 INJECTION, SOLUTION INTRAVASCULAR at 08:38

## 2019-08-13 RX ADMIN — HEPARIN SODIUM (PORCINE) LOCK FLUSH IV SOLN 100 UNIT/ML 500 UNITS: 100 SOLUTION at 13:55

## 2019-08-13 ASSESSMENT — PAIN SCALES - GENERAL: PAINLEVEL: MODERATE PAIN (4)

## 2019-08-13 NOTE — PROGRESS NOTES
Infusion Nursing Note:  Reuben Padilla presents today for Cycle 13 Day 1 of Keytruda.    Patient seen by provider today: Yes: SERGIO Barker   present during visit today: Not Applicable.    Note: N/A.    Intravenous Access:  Implanted Port.    Treatment Conditions:  Lab Results   Component Value Date    HGB 14.4 08/13/2019     Lab Results   Component Value Date    WBC 6.9 08/13/2019      Lab Results   Component Value Date    ANEU 3.8 08/13/2019     Lab Results   Component Value Date     08/13/2019      Lab Results   Component Value Date     08/13/2019                   Lab Results   Component Value Date    POTASSIUM 3.8 08/13/2019           Lab Results   Component Value Date    MAG 1.9 11/07/2018            Lab Results   Component Value Date    CR 1.07 08/13/2019                   Lab Results   Component Value Date    NIDHI 8.6 08/13/2019                Lab Results   Component Value Date    BILITOTAL 0.4 08/13/2019           Lab Results   Component Value Date    ALBUMIN 3.9 08/13/2019                    Lab Results   Component Value Date    ALT 27 08/13/2019           Lab Results   Component Value Date    AST 12 08/13/2019       Results reviewed, labs MET treatment parameters, ok to proceed with treatment.      Post Infusion Assessment:  Patient tolerated infusion without incident.  Blood return noted pre and post infusion.  Site patent and intact, free from redness, edema or discomfort.  No evidence of extravasations.  Access discontinued per protocol.       Discharge Plan:   Patient declined prescription refills.  Discharge instructions reviewed with: Patient.  Patient and/or family verbalized understanding of discharge instructions and all questions answered.  AVS to patient via [employer].  Patient will return 9/3/19 for next appointment.   Patient discharged in stable condition accompanied by: self.  Departure Mode: Ambulatory.    Aracelis Kam RN

## 2019-08-13 NOTE — NURSING NOTE
"Oncology Rooming Note    August 13, 2019 10:46 AM   Reuben Padilla is a 58 year old male who presents for:    Chief Complaint   Patient presents with     Port Draw     Port labs collected by RN.      Oncology Clinic Visit     Return Visit for Cancer of distal third of esophagus     Initial Vitals: /80 (BP Location: Right arm, Patient Position: Sitting)   Pulse 71   Temp 97.8  F (36.6  C) (Oral)   Resp 22   Wt (!) 158.2 kg (348 lb 12.8 oz)   SpO2 97%   BMI 40.31 kg/m   Estimated body mass index is 40.31 kg/m  as calculated from the following:    Height as of 7/22/19: 1.981 m (6' 6\").    Weight as of this encounter: 158.2 kg (348 lb 12.8 oz). Body surface area is 2.95 meters squared.  Moderate Pain (4) Comment: Data Unavailable   No LMP for male patient.  Allergies reviewed: Yes  Medications reviewed: Yes    Medications: Medication refills not needed today.  Pharmacy name entered into [employer]:    [id number] - [place] - [address] [place] [address] AT [address] & [facility] [place], [place] [phone]  [facility] [place], [place] - [address] [id number] - [place], [place] - [address]  [address]  [facility] [place] - [place], [place] - [address]    Clinical concerns: Patient here to discuss CT scan results & lab results.  He is very upset he had to wait for labs today.  No other complaints, concerns, or questions to bring forward today.   Nicole was notified.      Mary Chavez, RN, MSN              "

## 2019-08-13 NOTE — TELEPHONE ENCOUNTER
Called and updated pt with Dr. Singer's recommendation. He will keep us updated.    Sweetie Hernandez RN  Rheumatology Clinic

## 2019-08-13 NOTE — DISCHARGE INSTRUCTIONS

## 2019-08-13 NOTE — TELEPHONE ENCOUNTER
Michelle Singer MD Beard, Madeline, RN   Caller: Unspecified (Today, 12:59 PM)             Fine with me, depends on his comfort/pain tolerability. Could try going down 2.5 mg every 7 days to off.      Left message requesting return call.    Sweetie Hernandez RN  Rheumatology Clinic

## 2019-08-13 NOTE — TELEPHONE ENCOUNTER
Pt came to clinic after seeing PA in Oncology.  He feels that he would like to try to come off the prednisone, he wants to know exactly what the Enbrel is doing.  He is tired of being on the prednisone, he is gaining weight every week and is not happy about it.  He does realize that he may have increased pain, and if that happens he is fine with going back up on the prednisone, but then he wants to go off the Enbrel.  He only wants to be on one or the other.  He needs to know in his mind if the Enbrel is doing anything or not.     Will send to Dr. Singer to review and advise.    Sweetie Hernandez RN  Rheumatology Clinic

## 2019-08-13 NOTE — PROGRESS NOTES
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 His tumor is positive for cytokeratin 20, negative for P63 and CK7, and HER2 is negative. He started on FOLFOX (5FU/oxaliplatin) on 5/15/2017. He had a delay in treatment from 9/5-10/2/17 due to work related injury.    He had an excellent response by imaging throughout the summer 2017.    He a mixed response by imaging in late fall 2017.    In January 2018, he was switched to taxol and cyramza - had slight progression and neuropathy and clinical trial became available.       In March 2018, he was started on the [facility] Match Clinical Trial with crizotinib.  (MET amplification) He remained on crizotinib from March - July 2018.    August 2018, he was switched back to taxol/cramza.  In October, he was switched to Keytruda (PD1 overexpressor).    In April 2019, his infusion was held for three weeks, and he was treated with prednisone for grade 3 arthralgias.    INTERVAL HISTORY: Levi comes in today for followup. He is now on Keytruda (resumed 5/20).  His last CT showed continued response which is encouraging however off the prednisone, his arthralgias started to come back.  He was restarted on 20 mg of prednisone daily and Enbrel- since then the arthralgias are mild/stable in the hands/shoulders. He had tapered the prednisone down to 10 mg but then the arthralgias came back.  Rheum put him back on 12.5 mg and he is a little frustrated- he wants to be on one or the other, but not both.  Strength is better in the left hand >right.  He feels he is able to do all his ADLs and still is doing daily workouts.       He has had no issues with fevers or chills, no chest pain or shortness of breath, no nausea, vomiting, or constipation.  He was diagnosed with cellulitis of the leg  and tells me he is taking 900 mg of clindamycin TID and the redness is improved.  He is not having diarrhea and actually his stools have been more formed than usual.       He has no abdminal pain.  No n/v/d/c.       His energy levels are good.  Mood is good. Sleep is good on amitriptyline.     No dysphagia.        ROS: 10 point ROS neg other than the symptoms noted above in the HPI.    PHYSICAL EXAMINATION  /80 (BP Location: Right arm, Patient Position: Sitting)   Pulse 71   Temp 97.8  F (36.6  C) (Oral)   Resp 22   Wt (!) 158.2 kg (348 lb 12.8 oz)   SpO2 97%   BMI 40.31 kg/m     Wt Readings from Last 4 Encounters:   08/13/19 (!) 158.2 kg (348 lb 12.8 oz)   07/22/19 (!) 154.2 kg (340 lb)   07/01/19 (!) 153.3 kg (338 lb)   06/11/19 (!) 152.7 kg (336 lb 9.6 oz)     Constitutional: Alert, oriented male in no visible distress.  Eyes: PERRL. Anicteric sclerae.  ENT/Mouth: OM moist and pink without lesions or thrush.  CV: RRR  Resp: CTAB throughout  Abdomen: Soft, non-tender, non-distended. Obese. Bowel sounds present. Unable to palpate liver or spleen.  No RLQ tenderness.  Extremities: trace edema , no erythema or warmth over joints  Skin: Warm, dry. Lower legs show vascular changes with hyperpigmentation. No redness or warmth.   Lymph: No cervical or supraclavicular lymphadenopathy appreciated.   Neuro: CN II-XII grossly intact.     MUSC: no erythema or warmth over shoulders, DIPs, PIPs.  Hand strength normal.  Thenar wasting improved R hand  ECOG PS: 1          7/22/2019 08:23 8/13/2019 08:07   Sodium 138 141   Potassium 4.0 3.8   Chloride 109 109   Carbon Dioxide 23 26   Urea Nitrogen 20 26   Creatinine 0.90 1.07   GFR Estimate >90 76   GFR Estimate If Black >90 88   Calcium 9.3 8.6   Anion Gap 6 6   Albumin 4.0 3.9   Protein Total 7.3 7.3   Bilirubin Total 0.6 0.4   Alkaline Phosphatase 97 64   ALT 29 27   AST 13 12   TSH 1.05 3.12   Glucose 116 (H) 94   WBC 6.8 6.9   Hemoglobin 15.2 14.4   Hematocrit  45.4 44.0   Platelet Count 136 (L) 170   RBC Count 4.97 4.76   MCV 91 92     IMPRESSION:   1.  Stable size of primary mass in the inferior aspect of the  gastroesophageal junction. Unchanged encasement of the celiac artery  and its proximal branches which appear patent.  2.  No evidence of progressive metastatic disease. Unchanged size of  enlarged lymph nodes in the upper abdomen.  3.  Stable sub-6 mm pulmonary nodules.    IMPRESSION/PLAN:  1. Metastatic esophogeal adenocarcinoma. He has had treatment with FOLFOX and then transitioned to Taxol with ramicurimab.  He was then on crizotinib on the [employer] Match study.  He progressed on this and returned on taxol/cyramza. And is now on Keytruda.  Levi has had a great response radiographically to the Keytruda, however did develop a grade 3 arthralgias in the hands/shoulders.  These responded well to burst of steroids and 6 weeks off of treatment. The arthralgias did reoccur with restarting Keytruda on 5/20.  He resumed 20 mg of prednisone and seeing rheumatology and given Enbrel.  Pred has tapered down to 12.5 mg.  They are trying to switch him to a different medication, he has been in contact with them.     Reviewed the CT actual images with Levi and his brother today.  He has had a great response to Keytruda, we compared to last fall when his tumor was quite large.  Current scans show stable disease and we are all thrilled with this.  Continue Keytruda for now and we'll continue to work with Rheum in managing his joints.     Rheum: see prior notes.  Was on prednisone 20 mg daily and Enbrel.  Rheum now decreasing the prednisone- down to 12.5 and will decrease by 2.5 mg every 5 days, per Rheum.   Given the decreasing pred dose, we will not need pcp ppx.     HEME: A history of PE.  He is on Lovenox twice daily-has been compliant.  Given weight gain, discussed getting a Hep Xa in the next couple weeks to make sure we are still giving him the correct dose.  Encouraged diet  and exercise control for his weight gain on prednisone.      Neuro: He does have baseline neuropathy and is on gabapentin 600/300/600 mg daily.   This is stable      ID: recent cellulitis.  Levi state he was taking 900 mg TID.  This is above the dosing guidelines for oral management of cellulitis.  I told him to drop to 600 mg.  Currently no diarrhea.     SLEEP: insomnia related to steroids, amitriptyline helping, not needing ativan or Ambien.      Nicole Sutherland PA-C

## 2019-08-13 NOTE — PATIENT INSTRUCTIONS
Contact Numbers  [facility]: [phone]        Please call the [facility] Triage line if you experience a temperature greater than or equal to 100.5, shaking chills, have uncontrolled nausea, vomiting and/or diarrhea, dizziness, shortness of breath, chest pain, bleeding, unexplained bruising, or if you have any other new/concerning symptoms, questions or concerns.     If it is after hours, weekends, or holidays, please call the main hospital  at  [phone] and ask to speak to the Oncology doctor on call.     If you are having any concerning symptoms or wish to speak to a provider before your next infusion visit, please call your care coordinator or triage to notify them so we can adequately serve you.     If you need a refill on a narcotic prescription or other medication, please call triage before your infusion appointment.       August 2019 Sunday Monday Tuesday Wednesday Thursday Friday Saturday                       1     2     3       4     5     6     7     8     9     10       11     12     13    [facility] DRAW   7:00 AM   (15 min.)   [facility]   [facility] Draw    CT CHEST ABDOMEN PELVIS WWO   7:40 AM   (20 min.)   [facility]   [facility] CT    [facility] RETURN  11:25 AM   (50 min.)   Loraine Sutherland PA-C   [facility] ONC INFUSION 60  12:30 PM   (60 min.)    ONCOLOGY INFUSION   [facility] 14     15     16     17       18     19     20     21     22     23     24       25     26     27     28     29     30     31  Happy Birthday!                 September 2019 Sunday Monday Tuesday Wednesday Thursday Friday Saturday   1     2     3    [facility] DRAW   8:45 AM   (15 min.)    [facility]   [facility] Draw    [facility] RETURN   9:15 AM   (30 min.)   Kadi Dee MD   [facility] ONC INFUSION 60  10:00 AM   (60 min.)    ONCOLOGY INFUSION   [facility]  [facility] 4     5     6     7       8     9     10     11     12     13     14       15     16     17     18     19     20     21       22     23    [facility] LAB DRAW  10:15 AM   (15 min.)   [facility] LAB DRAW   [facility] Lab Draw    [facility] RETURN  10:45 AM   (50 min.)   Loraine Sutherland PA-C   [facility] ONC INFUSION 60  12:00 PM   (60 min.)    ONCOLOGY INFUSION   [facility] 24     25     26     27     28       29     30                                              Lab Results:  Recent Results (from the past 12 hour(s))   Comprehensive metabolic panel    Collection Time: 08/13/19  8:07 AM   Result Value Ref Range    Sodium 141 133 - 144 mmol/L    Potassium 3.8 3.4 - 5.3 mmol/L    Chloride 109 94 - 109 mmol/L    Carbon Dioxide 26 20 - 32 mmol/L    Anion Gap 6 3 - 14 mmol/L    Glucose 94 70 - 99 mg/dL    Urea Nitrogen 26 7 - 30 mg/dL    Creatinine 1.07 0.66 - 1.25 mg/dL    GFR Estimate 76 >60 mL/min/[1.73_m2]    GFR Estimate If Black 88 >60 mL/min/[1.73_m2]    Calcium 8.6 8.5 - 10.1 mg/dL    Bilirubin Total 0.4 0.2 - 1.3 mg/dL    Albumin 3.9 3.4 - 5.0 g/dL    Protein Total 7.3 6.8 - 8.8 g/dL    Alkaline Phosphatase 64 40 - 150 U/L    ALT 27 0 - 70 U/L    AST 12 0 - 45 U/L   TSH with free T4 reflex    Collection Time: 08/13/19  8:07 AM   Result Value Ref Range    TSH 3.12 0.40 - 4.00 mU/L   CBC with platelets differential    Collection Time: 08/13/19  8:07 AM   Result Value Ref Range    WBC 6.9 4.0 - 11.0 10e9/L    RBC Count 4.76 4.4 - 5.9 10e12/L    Hemoglobin 14.4 13.3 - 17.7 g/dL    Hematocrit 44.0 40.0 - 53.0 %    MCV 92 78 - 100 fl    MCH 30.3 26.5 - 33.0 pg    MCHC 32.7 31.5 - 36.5 g/dL    RDW 14.2 10.0 - 15.0 %    Platelet Count 170 150 - 450 10e9/L    Diff Method Automated Method     % Neutrophils 54.7 %    % Lymphocytes 36.5 %    % Monocytes 6.9 %    % Eosinophils 0.9 %    % Basophils 0.6 %    % Immature Granulocytes 0.4 %    Nucleated  RBCs 0 0 /100    Absolute Neutrophil 3.8 1.6 - 8.3 10e9/L    Absolute Lymphocytes 2.5 0.8 - 5.3 10e9/L    Absolute Monocytes 0.5 0.0 - 1.3 10e9/L    Absolute Eosinophils 0.1 0.0 - 0.7 10e9/L    Absolute Basophils 0.0 0.0 - 0.2 10e9/L    Abs Immature Granulocytes 0.0 0 - 0.4 10e9/L    Absolute Nucleated RBC 0.0

## 2019-08-16 ENCOUNTER — TRANSFERRED RECORDS (OUTPATIENT)
Dept: HEALTH INFORMATION MANAGEMENT | Facility: CLINIC | Age: 59
End: 2019-08-16

## 2019-08-20 ENCOUNTER — TELEPHONE (OUTPATIENT)
Dept: ONCOLOGY | Facility: CLINIC | Age: 59
End: 2019-08-20

## 2019-08-20 NOTE — TELEPHONE ENCOUNTER
Pt called in to triage to update care team that his eye doctor is scheduling him for R eye cataract 9/5. He will be started on antibiotic and anti-inflammatory eye drops the day before and for 4 weeks afterward. He is scheduled for his next Keytruda infusion 9/3 and wonders if this would interfere.

## 2019-08-20 NOTE — TELEPHONE ENCOUNTER
Medication Appeal Initiation    We have initiated an appeal for the requested medication:  Medication: ACTEMRA - Appeal initiated   Appeal Start Date:  8/20/2019  Insurance Company:  [employer]  Comments:   Letter in letters tab

## 2019-08-22 NOTE — TELEPHONE ENCOUNTER
Per Dr. Dee, infusion should not interfere with planned cataract surgery. LM on pt's phone number.

## 2019-08-23 NOTE — TELEPHONE ENCOUNTER
MEDICATION APPEAL DENIED    Medication: ACTEMRA - Appeal Denied    Denial Date:  08/23/2019  Denial Rational: not medically nesscessary    Second Level Appeal Information: See Letter

## 2019-09-03 ENCOUNTER — ONCOLOGY VISIT (OUTPATIENT)
Dept: ONCOLOGY | Facility: CLINIC | Age: 59
End: 2019-09-03
Attending: INTERNAL MEDICINE
Payer: COMMERCIAL

## 2019-09-03 ENCOUNTER — OFFICE VISIT (OUTPATIENT)
Dept: RHEUMATOLOGY | Facility: CLINIC | Age: 59
End: 2019-09-03
Attending: INTERNAL MEDICINE
Payer: COMMERCIAL

## 2019-09-03 VITALS
BODY MASS INDEX: 40.25 KG/M2 | DIASTOLIC BLOOD PRESSURE: 86 MMHG | WEIGHT: 315 LBS | TEMPERATURE: 97.8 F | SYSTOLIC BLOOD PRESSURE: 125 MMHG | RESPIRATION RATE: 16 BRPM | HEART RATE: 68 BPM | OXYGEN SATURATION: 97 %

## 2019-09-03 DIAGNOSIS — C15.5 CANCER OF DISTAL THIRD OF ESOPHAGUS (H): ICD-10-CM

## 2019-09-03 DIAGNOSIS — Z51.81 ENCOUNTER FOR MEDICATION MONITORING: ICD-10-CM

## 2019-09-03 DIAGNOSIS — C78.7 MALIGNANT NEOPLASM METASTATIC TO LIVER (H): Primary | ICD-10-CM

## 2019-09-03 DIAGNOSIS — D70.1 CHEMOTHERAPY-INDUCED NEUTROPENIA (H): ICD-10-CM

## 2019-09-03 DIAGNOSIS — T45.1X5A CHEMOTHERAPY-INDUCED NEUTROPENIA (H): ICD-10-CM

## 2019-09-03 DIAGNOSIS — M19.90 INFLAMMATORY ARTHRITIS: Primary | ICD-10-CM

## 2019-09-03 DIAGNOSIS — C79.9 METASTASIS FROM GASTRIC CANCER (H): ICD-10-CM

## 2019-09-03 DIAGNOSIS — C16.9 METASTASIS FROM GASTRIC CANCER (H): Primary | ICD-10-CM

## 2019-09-03 DIAGNOSIS — C16.9 METASTASIS FROM GASTRIC CANCER (H): ICD-10-CM

## 2019-09-03 DIAGNOSIS — C79.9 METASTASIS FROM GASTRIC CANCER (H): Primary | ICD-10-CM

## 2019-09-03 DIAGNOSIS — M06.00 SERONEGATIVE RHEUMATOID ARTHRITIS (H): ICD-10-CM

## 2019-09-03 LAB
ALBUMIN SERPL-MCNC: 3.9 G/DL (ref 3.4–5)
ALP SERPL-CCNC: 76 U/L (ref 40–150)
ALT SERPL W P-5'-P-CCNC: 31 U/L (ref 0–70)
ANION GAP SERPL CALCULATED.3IONS-SCNC: 5 MMOL/L (ref 3–14)
AST SERPL W P-5'-P-CCNC: 20 U/L (ref 0–45)
BASOPHILS # BLD AUTO: 0 10E9/L (ref 0–0.2)
BASOPHILS NFR BLD AUTO: 0.8 %
BILIRUB SERPL-MCNC: 0.6 MG/DL (ref 0.2–1.3)
BUN SERPL-MCNC: 17 MG/DL (ref 7–30)
CALCIUM SERPL-MCNC: 9.2 MG/DL (ref 8.5–10.1)
CHLORIDE SERPL-SCNC: 111 MMOL/L (ref 94–109)
CO2 SERPL-SCNC: 25 MMOL/L (ref 20–32)
CREAT SERPL-MCNC: 0.92 MG/DL (ref 0.66–1.25)
CRP SERPL-MCNC: <2.9 MG/L (ref 0–8)
DIFFERENTIAL METHOD BLD: NORMAL
EOSINOPHIL # BLD AUTO: 0.1 10E9/L (ref 0–0.7)
EOSINOPHIL NFR BLD AUTO: 1.2 %
ERYTHROCYTE [DISTWIDTH] IN BLOOD BY AUTOMATED COUNT: 14.2 % (ref 10–15)
GFR SERPL CREATININE-BSD FRML MDRD: >90 ML/MIN/{1.73_M2}
GLUCOSE SERPL-MCNC: 111 MG/DL (ref 70–99)
HCT VFR BLD AUTO: 44.6 % (ref 40–53)
HGB BLD-MCNC: 14.7 G/DL (ref 13.3–17.7)
IMM GRANULOCYTES # BLD: 0.1 10E9/L (ref 0–0.4)
IMM GRANULOCYTES NFR BLD: 1.4 %
LYMPHOCYTES # BLD AUTO: 1.8 10E9/L (ref 0.8–5.3)
LYMPHOCYTES NFR BLD AUTO: 35.4 %
MCH RBC QN AUTO: 30.4 PG (ref 26.5–33)
MCHC RBC AUTO-ENTMCNC: 33 G/DL (ref 31.5–36.5)
MCV RBC AUTO: 92 FL (ref 78–100)
MONOCYTES # BLD AUTO: 0.4 10E9/L (ref 0–1.3)
MONOCYTES NFR BLD AUTO: 7.1 %
NEUTROPHILS # BLD AUTO: 2.8 10E9/L (ref 1.6–8.3)
NEUTROPHILS NFR BLD AUTO: 54.1 %
NRBC # BLD AUTO: 0 10*3/UL
NRBC BLD AUTO-RTO: 0 /100
PLATELET # BLD AUTO: 180 10E9/L (ref 150–450)
POTASSIUM SERPL-SCNC: 4 MMOL/L (ref 3.4–5.3)
PROT SERPL-MCNC: 7.8 G/DL (ref 6.8–8.8)
RBC # BLD AUTO: 4.83 10E12/L (ref 4.4–5.9)
SODIUM SERPL-SCNC: 140 MMOL/L (ref 133–144)
TSH SERPL DL<=0.005 MIU/L-ACNC: 1.8 MU/L (ref 0.4–4)
WBC # BLD AUTO: 5.1 10E9/L (ref 4–11)

## 2019-09-03 PROCEDURE — 25800030 ZZH RX IP 258 OP 636: Mod: ZF | Performed by: INTERNAL MEDICINE

## 2019-09-03 PROCEDURE — 80053 COMPREHEN METABOLIC PANEL: CPT | Performed by: INTERNAL MEDICINE

## 2019-09-03 PROCEDURE — 99215 OFFICE O/P EST HI 40 MIN: CPT | Mod: ZP | Performed by: INTERNAL MEDICINE

## 2019-09-03 PROCEDURE — 84443 ASSAY THYROID STIM HORMONE: CPT | Performed by: INTERNAL MEDICINE

## 2019-09-03 PROCEDURE — 86140 C-REACTIVE PROTEIN: CPT | Performed by: INTERNAL MEDICINE

## 2019-09-03 PROCEDURE — 25000128 H RX IP 250 OP 636: Mod: ZF | Performed by: INTERNAL MEDICINE

## 2019-09-03 PROCEDURE — 85025 COMPLETE CBC W/AUTO DIFF WBC: CPT | Performed by: INTERNAL MEDICINE

## 2019-09-03 PROCEDURE — G0463 HOSPITAL OUTPT CLINIC VISIT: HCPCS | Mod: ZF

## 2019-09-03 PROCEDURE — 96413 CHEMO IV INFUSION 1 HR: CPT

## 2019-09-03 RX ORDER — SODIUM CHLORIDE 9 MG/ML
1000 INJECTION, SOLUTION INTRAVENOUS CONTINUOUS PRN
Status: CANCELLED
Start: 2019-09-03

## 2019-09-03 RX ORDER — METHYLPREDNISOLONE SODIUM SUCCINATE 125 MG/2ML
125 INJECTION, POWDER, LYOPHILIZED, FOR SOLUTION INTRAMUSCULAR; INTRAVENOUS
Status: CANCELLED
Start: 2019-09-03

## 2019-09-03 RX ORDER — DIPHENHYDRAMINE HYDROCHLORIDE 50 MG/ML
50 INJECTION INTRAMUSCULAR; INTRAVENOUS
Status: CANCELLED
Start: 2019-09-03

## 2019-09-03 RX ORDER — HEPARIN SODIUM (PORCINE) LOCK FLUSH IV SOLN 100 UNIT/ML 100 UNIT/ML
5 SOLUTION INTRAVENOUS DAILY PRN
Status: DISCONTINUED | OUTPATIENT
Start: 2019-09-03 | End: 2019-09-03 | Stop reason: HOSPADM

## 2019-09-03 RX ORDER — LORAZEPAM 2 MG/ML
0.5 INJECTION INTRAMUSCULAR EVERY 4 HOURS PRN
Status: CANCELLED
Start: 2019-09-03

## 2019-09-03 RX ORDER — ALBUTEROL SULFATE 0.83 MG/ML
2.5 SOLUTION RESPIRATORY (INHALATION)
Status: CANCELLED | OUTPATIENT
Start: 2019-09-03

## 2019-09-03 RX ORDER — EPINEPHRINE 1 MG/ML
0.3 INJECTION, SOLUTION INTRAMUSCULAR; SUBCUTANEOUS EVERY 5 MIN PRN
Status: CANCELLED | OUTPATIENT
Start: 2019-09-03

## 2019-09-03 RX ORDER — ALBUTEROL SULFATE 90 UG/1
1-2 AEROSOL, METERED RESPIRATORY (INHALATION)
Status: CANCELLED
Start: 2019-09-03

## 2019-09-03 RX ORDER — MEPERIDINE HYDROCHLORIDE 25 MG/ML
25 INJECTION INTRAMUSCULAR; INTRAVENOUS; SUBCUTANEOUS EVERY 30 MIN PRN
Status: CANCELLED | OUTPATIENT
Start: 2019-09-03

## 2019-09-03 RX ORDER — HEPARIN SODIUM (PORCINE) LOCK FLUSH IV SOLN 100 UNIT/ML 100 UNIT/ML
500 SOLUTION INTRAVENOUS ONCE
Status: COMPLETED | OUTPATIENT
Start: 2019-09-03 | End: 2019-09-03

## 2019-09-03 RX ORDER — EPINEPHRINE 0.3 MG/.3ML
0.3 INJECTION SUBCUTANEOUS EVERY 5 MIN PRN
Status: CANCELLED | OUTPATIENT
Start: 2019-09-03

## 2019-09-03 RX ORDER — HEPARIN SODIUM (PORCINE) LOCK FLUSH IV SOLN 100 UNIT/ML 100 UNIT/ML
500 SOLUTION INTRAVENOUS ONCE
Status: CANCELLED | OUTPATIENT
Start: 2019-09-03

## 2019-09-03 RX ADMIN — SODIUM CHLORIDE 250 ML: 9 INJECTION, SOLUTION INTRAVENOUS at 10:37

## 2019-09-03 RX ADMIN — HEPARIN SODIUM (PORCINE) LOCK FLUSH IV SOLN 100 UNIT/ML 5 ML: 100 SOLUTION at 09:26

## 2019-09-03 RX ADMIN — HEPARIN SODIUM (PORCINE) LOCK FLUSH IV SOLN 100 UNIT/ML 500 UNITS: 100 SOLUTION at 11:19

## 2019-09-03 RX ADMIN — SODIUM CHLORIDE 200 MG: 900 INJECTION, SOLUTION INTRAVENOUS at 10:48

## 2019-09-03 ASSESSMENT — PAIN SCALES - GENERAL: PAINLEVEL: NO PAIN (0)

## 2019-09-03 NOTE — PROGRESS NOTES
Infusion Nursing Note:  Reuben Padilla presents today for Cycle 14 Day 1 Keytruda.    Patient seen by provider today: Yes: Dr. Dee    Treatment Conditions:  Lab Results   Component Value Date    HGB 14.7 09/03/2019     Lab Results   Component Value Date    WBC 5.1 09/03/2019      Lab Results   Component Value Date    ANEU 2.8 09/03/2019     Lab Results   Component Value Date     09/03/2019      Lab Results   Component Value Date     09/03/2019                   Lab Results   Component Value Date    POTASSIUM 4.0 09/03/2019           Lab Results   Component Value Date    MAG 1.9 11/07/2018            Lab Results   Component Value Date    CR 0.92 09/03/2019                   Lab Results   Component Value Date    NIDHI 9.2 09/03/2019                Lab Results   Component Value Date    BILITOTAL 0.6 09/03/2019           Lab Results   Component Value Date    ALBUMIN 3.9 09/03/2019                    Lab Results   Component Value Date    ALT 31 09/03/2019           Lab Results   Component Value Date    AST 20 09/03/2019       Results reviewed, labs MET treatment parameters, ok to proceed with treatment.      Note: Pt with no concerns during infusion visit.    Intravenous Access:  Implanted Port.    Post Infusion Assessment:  Patient tolerated infusion without incident.  Blood return noted pre and post infusion.  Access discontinued per protocol.    Discharge Plan:   Patient declined prescription refills.  Discharge instructions reviewed with: Patient.  Patient and/or family verbalized understanding of discharge instructions and all questions answered.  Copy of AVS reviewed with patient and/or family.  Patient will return 9/23/19 for next appointment.  Patient discharged in stable condition accompanied by: self.  Departure Mode: Ambulatory.  Face to Face time: 0.    Luzmaria Madison RN, RN

## 2019-09-03 NOTE — PROGRESS NOTES
Rheumatology F/U Note    Reason for visit: F/U inflammatory arthritis sec check point inhibitor immunotherapy    Initial visit date: 5/29/2019    Last seen: 7/22/2019    DOS: 9/3/2019        HPI:    Reuben Padilla is a 58 year old  male who was referred to our clinic for evaluation and management of his check point inhibitor induced inflammatory arthritis.    Got diagnosed with metastatic esophageal cancer (mets in the liver) in 2017. He was put on one of check point inhibitors Keytruda (PD1 overexpressor) in 10/2018. Developed grade 3 musculoskeletal toxicity from immunotherapy. In April 2019, his infusion was held for three weeks, and he was treated with prednisone for grade 3 arthralgias. It involved hands, R shoulder and R arm pain.  Had pain, stiffness and poor , no joint swelling. He came off the drug, was treated with prednsione 80 mg max every day taper which helped. He is off prednisone now and back to keytruda (s/p one infusion) as was very effective for the cancer. He already started to notice recurrence of arthralgia and is afraid that it gets worse over course of TX which is essential to keep his cancer under excellent control. He gets the drug every 3 weeks.    Today, has h/o shoulder ache, but no hand arthritis.    No personal hx of psoraisis or fh/o psoriasis. There is fh/o OA. No fh/o RA, autoimmune disorders. Has a 26 yo son. 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      Has neuropathy in his legs from mid shin to ankles like numbness.      Has dry mouth during sleep. Gained weight on prednisone.     Interval Hx 7/22/2019: Levi was seen at [facility] during Keytruda infusion. He had an oncology visit today, plan is to re-scan in Aug 2019. Doing very well, has no complaints. Is on Enbrel s/p 4 inj, no SEs. Started tapering his prednisone from 20 mg every day, now on 17.5 mg every day since 7/20/2019. No joint pain (onlt 1/10 pain if he tries hard to make a full  fist) or swelling or stiffness.     Today 9/3/2019: Seen at infusion center on my non-clinic day per his request. On enrel since 2019, tapered prednisone down to 5 mg every day, wants to taper off and see if enbrel alone is enough to control joint sx. Reports increased stiffness in hands but tolerable. Tolerates enbrel wee, cancer is under control.      ROS:  A comprehensive ROS was done, positives are per HPI.        Past Medical History:   Diagnosis Date     Arrhythmia      Cancer (H)     esophageal     Glaucoma      Hypertension      Paroxysmal A-fib (H)      Tubular adenoma 2017     Past Surgical History:   Procedure Laterality Date     AMPUTATION      toe     ARTHROSCOPY KNEE       bunion surgery       CHOLECYSTECTOMY       COLONOSCOPY  2017    remove 2 polyps     ESOPHAGOSCOPY, GASTROSCOPY, DUODENOSCOPY (EGD), COMBINED N/A 10/10/2018    Procedure: COMBINED ESOPHAGOSCOPY, GASTROSCOPY, DUODENOSCOPY (EGD);  Esophagogastroduodenoscopy ;  Surgeon: Leonel Joel MD;  Location: [facility] OR     INSERT PORT VASCULAR ACCESS Right 2017    Procedure: INSERT PORT VASCULAR ACCESS;  Single Lumen Chest Power Port;  Surgeon: Jasiel Hu PA-C;  Location: [facility] OR     FHx: No fhx of RA  Social History     Socioeconomic History     Marital status: Single     Spouse name: Not on file     Number of children: Not on file     Years of education: Not on file     Highest education level: Not on file   Occupational History     Not on file   Social Needs     Financial resource strain: Not on file     Food insecurity:     Worry: Not on file     Inability: Not on file     Transportation needs:     Medical: Not on file     Non-medical: Not on file   Tobacco Use     Smoking status: Former Smoker     Packs/day: 1.00     Years: 20.00     Pack years: 20.00     Types: Cigarettes     Last attempt to quit: 2006     Years since quittin.6     Smokeless tobacco: Never Used   Substance and Sexual Activity      Alcohol use: Yes     Comment: occasional     Drug use: No     Comment: h/o over 30 years ago     Sexual activity: Not on file   Lifestyle     Physical activity:     Days per week: Not on file     Minutes per session: Not on file     Stress: Not on file   Relationships     Social connections:     Talks on phone: Not on file     Gets together: Not on file     Attends [employer] service: Not on file     Active member of club or organization: Not on file     Attends meetings of clubs or organizations: Not on file     Relationship status: Not on file     Intimate partner violence:     Fear of current or ex partner: Not on file     Emotionally abused: Not on file     Physically abused: Not on file     Forced sexual activity: Not on file   Other Topics Concern     Parent/sibling w/ CABG, MI or angioplasty before 65F 55M? Not Asked   Social History Narrative     Not on file     Patient Active Problem List   Diagnosis     Cancer of distal third of esophagus (H)     Acute pulmonary embolism (H)     Tear of right supraspinatus tendon     Examination of participant in clinical trial     Esophageal obstruction     Paroxysmal A-fib (H)     Orthostatic hypotension     Chemotherapy-induced neutropenia (H)     G tube feedings (H)     Varicose veins of both lower extremities with complications     Traumatic closed fracture of thoracic vertebra with minimal displacement (H)     Malignant neoplasm metastatic to liver (H)     Impotence of organic origin     Edema     Congenital stenosis of esophagus     Allergies   Allergen Reactions     Penicillin G Other (See Comments)     Pt not sure-     Penicillins Unknown       Outpatient Encounter Medications as of 9/3/2019   Medication Sig Dispense Refill     amitriptyline (ELAVIL) 10 MG tablet Take 1 tablet (10 mg) by mouth At Bedtime 30 tablet 3     diphenoxylate-atropine (LOMOTIL) 2.5-0.025 MG tablet Take 1-2 tablets by mouth 4 times daily as needed for diarrhea (Max of 8 pills in 24 hours)  100 tablet 1     enoxaparin (LOVENOX) 100 MG/ML syringe Inject 1 mL (100 mg) Subcutaneous every 12 hours 60 Syringe 3     Etanercept 50 MG/ML SOCT Inject 50 mg Subcutaneous every 7 days Use with AutoTouch. Hold for signs of infection and seek medical attention. 4 Cartridge 5     gabapentin (NEURONTIN) 300 MG capsule 2 tablets in the am and 3 tablets before bed 150 capsule 5     latanoprost (XALATAN) 0.005 % ophthalmic solution Place 1 drop into both eyes At Bedtime 1 Bottle 0     lidocaine-prilocaine (EMLA) cream Apply topically as needed for moderate pain 30 g 3     LORazepam (ATIVAN) 0.5 MG tablet Take 1 tablet (0.5 mg) by mouth every 4 hours as needed (Anxiety, Nausea/Vomiting or Sleep) (Patient not taking: Reported on 7/1/2019) 30 tablet 2     multivitamin, therapeutic with minerals (MULTI-VITAMIN) TABS tablet Take 1 tablet by mouth daily       timolol (TIMOPTIC) 0.5 % ophthalmic solution Place into both eyes daily       Tocilizumab 162 MG/0.9ML SOAJ Inject 162 mg Subcutaneous once a week (Patient not taking: Reported on 9/3/2019) 4 pen 3     Facility-Administered Encounter Medications as of 9/3/2019   Medication Dose Route Frequency Provider Last Rate Last Dose     heparin 100 UNIT/ML injection 5 mL  5 mL Intracatheter Daily PRN Kadi Dee MD   5 mL at 09/03/19 0926     sod bicarbonate-citric acid-simethicone (EZ GAS) 2.21-1.53-0.04 g packet 4 g  4 g Oral Once Justin Yao MD         [DISCONTINUED] lidocaine (PF) (XYLOCAINE) 1 % injection 4 mL  4 mL   Willie Wood DO   4 mL at 03/19/19 0855     [DISCONTINUED] triamcinolone (KENALOG-40) injection 40 mg  40 mg   Willie Wood DO   40 mg at 03/19/19 0855               His records were reviewed.    Component      Latest Ref Rng & Units 5/29/2019   RNP Antibody IgG      0.0 - 0.9 AI 0.3   Ralph KELTON Antibody IgG      0.0 - 0.9 AI <0.2   SSA (Ro) (KELTON) Antibody, IgG      0.0 - 0.9 AI <0.2   SSB (La) (KELTON) Antibody, IgG      0.0 - 0.9 AI <0.2    Scleroderma Antibody Scl-70 KELTON IgG      0.0 - 0.9 AI <0.2   Quantiferon-TB Gold Plus Result      NEG:Negative Negative   TB1 Ag minus Nil Value      IU/mL 0.00   TB2 Ag minus Nil Value      IU/mL 0.00   Mitogen minus Nil Result      IU/mL 9.13   Nil Result      IU/mL 0.03   EDELMIRA interpretation      NEG:Negative Positive (A)   EDELMIRA pattern 1       SPECKLED   EDELMIRA titer 1       1:320   Beta 2 Glycoprotein 1 Antibody IgG      <7 U/mL 0.8   Beta 2 Glycoprotein 1 Antibody IgM      <7 U/mL <0.9   Cardiolipin Antibody IgG      0.0 - 19.9 GPL-U/mL <1.6   Cardiolipin Antibody IgM      0.0 - 19.9 MPL-U/mL 0.9   Beta 2 Glycoprotein 1 Antibody IgA      <7 U/mL 4.0   Cardiolipin Antibody IgA      0.0 - 19.9 APL U/mL 1.3   Lupus Result      NEG:Negative Negative   Rheumatoid Factor      <20 IU/mL <20   Cyclic Citrullinated Peptide Antibody, IgG      <7 U/mL 1   DNA-ds      <10 IU/mL <1   CRP Inflammation      0.0 - 8.0 mg/L 4.8   Sed Rate      0 - 20 mm/h 14       Results for orders placed or performed in visit on 09/03/19   Comprehensive metabolic panel   Result Value Ref Range    Sodium 140 133 - 144 mmol/L    Potassium 4.0 3.4 - 5.3 mmol/L    Chloride 111 (H) 94 - 109 mmol/L    Carbon Dioxide 25 20 - 32 mmol/L    Anion Gap 5 3 - 14 mmol/L    Glucose 111 (H) 70 - 99 mg/dL    Urea Nitrogen 17 7 - 30 mg/dL    Creatinine 0.92 0.66 - 1.25 mg/dL    GFR Estimate >90 >60 mL/min/[1.73_m2]    GFR Estimate If Black >90 >60 mL/min/[1.73_m2]    Calcium 9.2 8.5 - 10.1 mg/dL    Bilirubin Total 0.6 0.2 - 1.3 mg/dL    Albumin 3.9 3.4 - 5.0 g/dL    Protein Total 7.8 6.8 - 8.8 g/dL    Alkaline Phosphatase 76 40 - 150 U/L    ALT 31 0 - 70 U/L    AST 20 0 - 45 U/L   CBC with platelets differential   Result Value Ref Range    WBC 5.1 4.0 - 11.0 10e9/L    RBC Count 4.83 4.4 - 5.9 10e12/L    Hemoglobin 14.7 13.3 - 17.7 g/dL    Hematocrit 44.6 40.0 - 53.0 %    MCV 92 78 - 100 fl    MCH 30.4 26.5 - 33.0 pg    MCHC 33.0 31.5 - 36.5 g/dL    RDW 14.2 10.0  - 15.0 %    Platelet Count 180 150 - 450 10e9/L    Diff Method Automated Method     % Neutrophils 54.1 %    % Lymphocytes 35.4 %    % Monocytes 7.1 %    % Eosinophils 1.2 %    % Basophils 0.8 %    % Immature Granulocytes 1.4 %    Nucleated RBCs 0 0 /100    Absolute Neutrophil 2.8 1.6 - 8.3 10e9/L    Absolute Lymphocytes 1.8 0.8 - 5.3 10e9/L    Absolute Monocytes 0.4 0.0 - 1.3 10e9/L    Absolute Eosinophils 0.1 0.0 - 0.7 10e9/L    Absolute Basophils 0.0 0.0 - 0.2 10e9/L    Abs Immature Granulocytes 0.1 0 - 0.4 10e9/L    Absolute Nucleated RBC 0.0                Ph.E:    Reviewed vital signs.    Constitutional: WD/WN. Pleasant. In no acute distress.   Eyes: EOM intact, PERRLA, sclera anicteric, conj not injected  HEENT: No oral ulcers or thrush. Normal salivary pool.  Neck: No cervical LAP or thyromegaly  Chest: Clear to auscultation bilaterally  CV: RRR, no murmurs/ rubs or gallops. Trace LE edema.   GI: Abdomen is soft and non tender.   MS: No synovitis. Cool joints. No tenderness of the joints. No  joint deformities. Full ROM of the joints. No nodules. No Jaccoud's deformity.   Skin: Hyperpigmentation over shins suggestive of stasis dermatitis, purple discoloration/purpuric skin changes over LEs (chronic per pt)  Neuro: A&O x 3. Grossly non focal, muscular power 5/5 in all ext  Psych: NL affect and mood    Assessment/ plan:    Inflammatory arthritis included by check point inhibitor Tx/seroneg RA. Positive EDELMIRA could be due to malignancy, no other signs of CTD. Discussed Dx and Tx. On prednisone 20 mg every day since initial visit 5/2109 with great response. Tried to start actemra as steroid sparing agent of choice per current data but unfortunately his insurance declined coverage, so we started him on enbrel (anti-TNF) ad he is on it since 6/2019. IA is under fair control with recurrence but tolerable hand stiffness by tapering prednisone down  to 5 mg every day. He wants to taper prednisone off and see if enbrel  is able to control arthritis, if it fails, will submit to insurance for actemra again.    Today's plan:    Taper prednisone to off.    Continue weekly enbrel; risks were discussed. Was advised to call if any infections and hold enbrel with an infection    RTC in 3 months (I could see him during one of the infusions as well if I happen to be here on a Tuesday)    Asked him to call if arthritis flares up by tapering prednisone off

## 2019-09-03 NOTE — LETTER
9/3/2019       RE: Reuben Padilla  [address] [facility] [address]     Dear Colleague,    Thank you for referring your patient, Reuben Padilla, to the [facility] RHEUMATOLOGY at [facility]. Please see a copy of my visit note below.    Rheumatology F/U Note    Reason for visit: F/U inflammatory arthritis sec check point inhibitor immunotherapy    Initial visit date: 5/29/2019    Last seen: 7/22/2019    DOS: 9/3/2019        HPI:    Reuben Padilla is a 58 year old  male who was referred to our clinic for evaluation and management of his check point inhibitor induced inflammatory arthritis.    Got diagnosed with metastatic esophageal cancer (mets in the liver) in 2017. He was put on one of check point inhibitors Keytruda (PD1 overexpressor) in 10/2018. Developed grade 3 musculoskeletal toxicity from immunotherapy. In April 2019, his infusion was held for three weeks, and he was treated with prednisone for grade 3 arthralgias. It involved hands, R shoulder and R arm pain.  Had pain, stiffness and poor , no joint swelling. He came off the drug, was treated with prednsione 80 mg max every day taper which helped. He is off prednisone now and back to keytruda (s/p one infusion) as was very effective for the cancer. He already started to notice recurrence of arthralgia and is afraid that it gets worse over course of TX which is essential to keep his cancer under excellent control. He gets the drug every 3 weeks.    Today, has h/o shoulder ache, but no hand arthritis.    No personal hx of psoraisis or fh/o psoriasis. There is fh/o OA. No fh/o RA, autoimmune disorders. Has a 24 yo son. 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      Has neuropathy in his legs from mid shin to ankles like numbness.      Has dry mouth during sleep. Gained weight on prednisone.     Interval Hx 7/22/2019: Levi was seen at [facility] during Keytruda infusion. He had an  oncology visit today, plan is to re-scan in Aug 2019. Doing very well, has no complaints. Is on Enbrel s/p 4 inj, no SEs. Started tapering his prednisone from 20 mg every day, now on 17.5 mg every day since 7/20/2019. No joint pain (onlt 1/10 pain if he tries hard to make a full fist) or swelling or stiffness.     Today 9/3/2019: Seen at infusion center on my non-clinic day per his request. On enrel since 6/2019, tapered prednisone down to 5 mg every day, wants to taper off and see if enbrel alone is enough to control joint sx. Reports increased stiffness in hands but tolerable. Tolerates enbrel wee, cancer is under control.      ROS:  A comprehensive ROS was done, positives are per HPI.      Past Medical History:   Diagnosis Date     Arrhythmia      Cancer (H)     esophageal     Glaucoma      Hypertension      Paroxysmal A-fib (H)      Tubular adenoma 02/2017     Past Surgical History:   Procedure Laterality Date     AMPUTATION      toe     ARTHROSCOPY KNEE       bunion surgery       CHOLECYSTECTOMY       COLONOSCOPY  02/2017    remove 2 polyps     ESOPHAGOSCOPY, GASTROSCOPY, DUODENOSCOPY (EGD), COMBINED N/A 10/10/2018    Procedure: COMBINED ESOPHAGOSCOPY, GASTROSCOPY, DUODENOSCOPY (EGD);  Esophagogastroduodenoscopy ;  Surgeon: Leonel Joel MD;  Location: [facility] OR     INSERT PORT VASCULAR ACCESS Right 5/9/2017    Procedure: INSERT PORT VASCULAR ACCESS;  Single Lumen Chest Power Port;  Surgeon: Jasiel Hu PA-C;  Location: [facility] OR     FHx: No fhx of RA  Social History     Socioeconomic History     Marital status: Single     Spouse name: Not on file     Number of children: Not on file     Years of education: Not on file     Highest education level: Not on file   Occupational History     Not on file   Social Needs     Financial resource strain: Not on file     Food insecurity:     Worry: Not on file     Inability: Not on file     Transportation needs:     Medical: Not on file     Non-medical: Not  on file   Tobacco Use     Smoking status: Former Smoker     Packs/day: 1.00     Years: 20.00     Pack years: 20.00     Types: Cigarettes     Last attempt to quit: 2006     Years since quittin.6     Smokeless tobacco: Never Used   Substance and Sexual Activity     Alcohol use: Yes     Comment: occasional     Drug use: No     Comment: h/o over 30 years ago     Sexual activity: Not on file   Lifestyle     Physical activity:     Days per week: Not on file     Minutes per session: Not on file     Stress: Not on file   Relationships     Social connections:     Talks on phone: Not on file     Gets together: Not on file     Attends [employer] service: Not on file     Active member of club or organization: Not on file     Attends meetings of clubs or organizations: Not on file     Relationship status: Not on file     Intimate partner violence:     Fear of current or ex partner: Not on file     Emotionally abused: Not on file     Physically abused: Not on file     Forced sexual activity: Not on file   Other Topics Concern     Parent/sibling w/ CABG, MI or angioplasty before 65F 55M? Not Asked   Social History Narrative     Not on file     Patient Active Problem List   Diagnosis     Cancer of distal third of esophagus (H)     Acute pulmonary embolism (H)     Tear of right supraspinatus tendon     Examination of participant in clinical trial     Esophageal obstruction     Paroxysmal A-fib (H)     Orthostatic hypotension     Chemotherapy-induced neutropenia (H)     G tube feedings (H)     Varicose veins of both lower extremities with complications     Traumatic closed fracture of thoracic vertebra with minimal displacement (H)     Malignant neoplasm metastatic to liver (H)     Impotence of organic origin     Edema     Congenital stenosis of esophagus     Allergies   Allergen Reactions     Penicillin G Other (See Comments)     Pt not sure-     Penicillins Unknown       Outpatient Encounter Medications as of 9/3/2019    Medication Sig Dispense Refill     amitriptyline (ELAVIL) 10 MG tablet Take 1 tablet (10 mg) by mouth At Bedtime 30 tablet 3     diphenoxylate-atropine (LOMOTIL) 2.5-0.025 MG tablet Take 1-2 tablets by mouth 4 times daily as needed for diarrhea (Max of 8 pills in 24 hours) 100 tablet 1     enoxaparin (LOVENOX) 100 MG/ML syringe Inject 1 mL (100 mg) Subcutaneous every 12 hours 60 Syringe 3     Etanercept 50 MG/ML SOCT Inject 50 mg Subcutaneous every 7 days Use with AutoTouch. Hold for signs of infection and seek medical attention. 4 Cartridge 5     gabapentin (NEURONTIN) 300 MG capsule 2 tablets in the am and 3 tablets before bed 150 capsule 5     latanoprost (XALATAN) 0.005 % ophthalmic solution Place 1 drop into both eyes At Bedtime 1 Bottle 0     lidocaine-prilocaine (EMLA) cream Apply topically as needed for moderate pain 30 g 3     LORazepam (ATIVAN) 0.5 MG tablet Take 1 tablet (0.5 mg) by mouth every 4 hours as needed (Anxiety, Nausea/Vomiting or Sleep) (Patient not taking: Reported on 7/1/2019) 30 tablet 2     multivitamin, therapeutic with minerals (MULTI-VITAMIN) TABS tablet Take 1 tablet by mouth daily       timolol (TIMOPTIC) 0.5 % ophthalmic solution Place into both eyes daily       Tocilizumab 162 MG/0.9ML SOAJ Inject 162 mg Subcutaneous once a week (Patient not taking: Reported on 9/3/2019) 4 pen 3     Facility-Administered Encounter Medications as of 9/3/2019   Medication Dose Route Frequency Provider Last Rate Last Dose     heparin 100 UNIT/ML injection 5 mL  5 mL Intracatheter Daily PRN Kadi Dee MD   5 mL at 09/03/19 0926     sod bicarbonate-citric acid-simethicone (EZ GAS) 2.21-1.53-0.04 g packet 4 g  4 g Oral Once Justin Yao MD         [DISCONTINUED] lidocaine (PF) (XYLOCAINE) 1 % injection 4 mL  4 mL   Willie Wood DO   4 mL at 03/19/19 0855     [DISCONTINUED] triamcinolone (KENALOG-40) injection 40 mg  40 mg   Willie Wood DO   40 mg at 03/19/19 0855     His  records were reviewed.    Component      Latest Ref Rng & Units 5/29/2019   RNP Antibody IgG      0.0 - 0.9 AI 0.3   Ralph KELTON Antibody IgG      0.0 - 0.9 AI <0.2   SSA (Ro) (KELTON) Antibody, IgG      0.0 - 0.9 AI <0.2   SSB (La) (KELTON) Antibody, IgG      0.0 - 0.9 AI <0.2   Scleroderma Antibody Scl-70 KELTON IgG      0.0 - 0.9 AI <0.2   Quantiferon-TB Gold Plus Result      NEG:Negative Negative   TB1 Ag minus Nil Value      IU/mL 0.00   TB2 Ag minus Nil Value      IU/mL 0.00   Mitogen minus Nil Result      IU/mL 9.13   Nil Result      IU/mL 0.03   EDELMIRA interpretation      NEG:Negative Positive (A)   EDELMIRA pattern 1       SPECKLED   EDELMIRA titer 1       1:320   Beta 2 Glycoprotein 1 Antibody IgG      <7 U/mL 0.8   Beta 2 Glycoprotein 1 Antibody IgM      <7 U/mL <0.9   Cardiolipin Antibody IgG      0.0 - 19.9 GPL-U/mL <1.6   Cardiolipin Antibody IgM      0.0 - 19.9 MPL-U/mL 0.9   Beta 2 Glycoprotein 1 Antibody IgA      <7 U/mL 4.0   Cardiolipin Antibody IgA      0.0 - 19.9 APL U/mL 1.3   Lupus Result      NEG:Negative Negative   Rheumatoid Factor      <20 IU/mL <20   Cyclic Citrullinated Peptide Antibody, IgG      <7 U/mL 1   DNA-ds      <10 IU/mL <1   CRP Inflammation      0.0 - 8.0 mg/L 4.8   Sed Rate      0 - 20 mm/h 14       Results for orders placed or performed in visit on 09/03/19   Comprehensive metabolic panel   Result Value Ref Range    Sodium 140 133 - 144 mmol/L    Potassium 4.0 3.4 - 5.3 mmol/L    Chloride 111 (H) 94 - 109 mmol/L    Carbon Dioxide 25 20 - 32 mmol/L    Anion Gap 5 3 - 14 mmol/L    Glucose 111 (H) 70 - 99 mg/dL    Urea Nitrogen 17 7 - 30 mg/dL    Creatinine 0.92 0.66 - 1.25 mg/dL    GFR Estimate >90 >60 mL/min/[1.73_m2]    GFR Estimate If Black >90 >60 mL/min/[1.73_m2]    Calcium 9.2 8.5 - 10.1 mg/dL    Bilirubin Total 0.6 0.2 - 1.3 mg/dL    Albumin 3.9 3.4 - 5.0 g/dL    Protein Total 7.8 6.8 - 8.8 g/dL    Alkaline Phosphatase 76 40 - 150 U/L    ALT 31 0 - 70 U/L    AST 20 0 - 45 U/L   CBC with  platelets differential   Result Value Ref Range    WBC 5.1 4.0 - 11.0 10e9/L    RBC Count 4.83 4.4 - 5.9 10e12/L    Hemoglobin 14.7 13.3 - 17.7 g/dL    Hematocrit 44.6 40.0 - 53.0 %    MCV 92 78 - 100 fl    MCH 30.4 26.5 - 33.0 pg    MCHC 33.0 31.5 - 36.5 g/dL    RDW 14.2 10.0 - 15.0 %    Platelet Count 180 150 - 450 10e9/L    Diff Method Automated Method     % Neutrophils 54.1 %    % Lymphocytes 35.4 %    % Monocytes 7.1 %    % Eosinophils 1.2 %    % Basophils 0.8 %    % Immature Granulocytes 1.4 %    Nucleated RBCs 0 0 /100    Absolute Neutrophil 2.8 1.6 - 8.3 10e9/L    Absolute Lymphocytes 1.8 0.8 - 5.3 10e9/L    Absolute Monocytes 0.4 0.0 - 1.3 10e9/L    Absolute Eosinophils 0.1 0.0 - 0.7 10e9/L    Absolute Basophils 0.0 0.0 - 0.2 10e9/L    Abs Immature Granulocytes 0.1 0 - 0.4 10e9/L    Absolute Nucleated RBC 0.0      Ph.E:    Reviewed vital signs.    Constitutional: WD/WN. Pleasant. In no acute distress.   Eyes: EOM intact, PERRLA, sclera anicteric, conj not injected  HEENT: No oral ulcers or thrush. Normal salivary pool.  Neck: No cervical LAP or thyromegaly  Chest: Clear to auscultation bilaterally  CV: RRR, no murmurs/ rubs or gallops. Trace LE edema.   GI: Abdomen is soft and non tender.   MS: No synovitis. Cool joints. No tenderness of the joints. No  joint deformities. Full ROM of the joints. No nodules. No Jaccoud's deformity.   Skin: Hyperpigmentation over shins suggestive of stasis dermatitis, purple discoloration/purpuric skin changes over LEs (chronic per pt)  Neuro: A&O x 3. Grossly non focal, muscular power 5/5 in all ext  Psych: NL affect and mood    Assessment/ plan:    Inflammatory arthritis included by check point inhibitor Tx/seroneg RA. Positive EDELMIRA could be due to malignancy, no other signs of CTD. Discussed Dx and Tx. On prednisone 20 mg every day since initial visit 5/2109 with great response. Tried to start actemra as steroid sparing agent of choice per current data but unfortunately his  insurance declined coverage, so we started him on enbrel (anti-TNF) ad he is on it since 6/2019. IA is under fair control with recurrence but tolerable hand stiffness by tapering prednisone down  to 5 mg every day. He wants to taper prednisone off and see if enbrel is able to control arthritis, if it fails, will submit to insurance for actemra again.    Today's plan:    Taper prednisone to off.    Continue weekly enbrel; risks were discussed. Was advised to call if any infections and hold enbrel with an infection    RTC in 3 months (I could see him during one of the infusions as well if I happen to be here on a Tuesday)    Asked him to call if arthritis flares up by tapering prednisone off      Again, thank you for allowing me to participate in the care of your patient.      Sincerely,    Michelle Singer MD

## 2019-09-03 NOTE — NURSING NOTE
"Oncology Rooming Note    September 3, 2019 9:55 AM   Reuben Padilla is a 59 year old male who presents for:    Chief Complaint   Patient presents with     Port Draw     Labs drawn via port by RN in lab. VS taken.      Oncology Clinic Visit     RETURN VISIT; ESOPHAGEAL CANCER; VITALS TAKEN     Initial Vitals: /86   Pulse 68   Temp 97.8  F (36.6  C) (Oral)   Resp 16   Wt (!) 158 kg (348 lb 4.8 oz)   SpO2 97%   BMI 40.25 kg/m   Estimated body mass index is 40.25 kg/m  as calculated from the following:    Height as of 7/22/19: 1.981 m (6' 6\").    Weight as of this encounter: 158 kg (348 lb 4.8 oz). Body surface area is 2.95 meters squared.  No Pain (0) Comment: Data Unavailable   No LMP for male patient.  Allergies reviewed: Yes  Medications reviewed: Yes    Medications: Medication refills not needed today.  Pharmacy name entered into [employer]    [id number] - [place] - [address] [place] [address] AT INTERSECTION 109[facility] [place], [place] - [address] [place] [address] [address]  [id number] - [place], [place] - [address]  [id number] - [place], [place] - [address]  [facility] [place] - [place] - [address]    Clinical concerns: No new concerns today. Dr. Dee was notified.      PANCHO HALL, DULCE            "

## 2019-09-03 NOTE — PROGRESS NOTES
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 His tumor is positive for cytokeratin 20, negative for P63 and CK7, and HER2 is negative. He started on FOLFOX (5FU/oxaliplatin) on 5/15/2017. He had a delay in treatment from 9/5-10/2/17 due to work related injury.    He had an excellent response by imaging throughout the summer 2017.    He a mixed response by imaging in late fall 2017.    In January 2018, he was switched to taxol and cyramza - had slight progression and neuropathy and clinical trial became available.       In March 2018, he was started on the [facility] Match Clinical Trial with crizotinib.  (MET amplification) He remained on crizotinib from March - July 2018.    August 2018, he was switched back to taxol/cramza.  In October, he was switched to Keytruda (PD1 overexpressor).    In April 2019, his infusion was held for three weeks, and he was treated with prednisone for grade 3 arthralgias.  Keytruda was resumed.  He is now seeing rheumatology and is tapering off of prednisone.  He is also on Enbrel.    INTERVAL HISTORY: Levi comes in today for followup. He is now on Keytruda every three weeks.      He is on prednisone taper with his last day being yesterday.  He is also on enbrel.   He is feeling better and is very happy about this.  Hand/ strength is much improved.     He has had no issues with fevers or chills, no chest pain or shortness of breath, no nausea, vomiting, diarrhea or constipation.         He has no abdminal pain.  No n/v/d/c.       His energy levels are good.       No dysphagia.    He recently had cellulitis in his right lower extremity and was treated with clindamycin.        ROS: 10 point ROS neg other than the symptoms noted above in the HPI.    PHYSICAL EXAMINATION  There were no  vitals taken for this visit.   Wt Readings from Last 4 Encounters:   08/13/19 (!) 158.2 kg (348 lb 12.8 oz)   07/22/19 (!) 154.2 kg (340 lb)   07/01/19 (!) 153.3 kg (338 lb)   06/11/19 (!) 152.7 kg (336 lb 9.6 oz)     Constitutional: Alert, oriented male in no visible distress.  Eyes: PERRL. Anicteric sclerae.  ENT/Mouth: OM moist and pink without lesions or thrush.  CV: RRR  Resp: CTAB throughout  Abdomen: Soft, non-tender, non-distended. Obese. Bowel sounds present. Unable to palpate liver or spleen.  No RLQ tenderness.  Extremities: trace edema , no erythema or warmth over joints  Skin: Warm, dry. Hyperpigmentation in his lower extremities R > L, no warmth  Lymph: No cervical or supraclavicular lymphadenopathy appreciated.   Neuro: CN II-XII grossly intact.     MUSC: no erythema or warmth over shoulders, DIPs, PIPs.  Hand strength normal  ECOG PS: 1             Lab Results   Component Value Date    WBC 6.9 08/13/2019     Lab Results   Component Value Date    RBC 4.76 08/13/2019     Lab Results   Component Value Date    HGB 14.4 08/13/2019     Lab Results   Component Value Date    HCT 44.0 08/13/2019     No components found for: MCT  Lab Results   Component Value Date    MCV 92 08/13/2019     Lab Results   Component Value Date    MCH 30.3 08/13/2019     Lab Results   Component Value Date    MCHC 32.7 08/13/2019     Lab Results   Component Value Date    RDW 14.2 08/13/2019     Lab Results   Component Value Date     08/13/2019       Recent Labs   Lab Test 08/13/19  0807 07/22/19  0823    138   POTASSIUM 3.8 4.0   CHLORIDE 109 109   CO2 26 23   ANIONGAP 6 6   GLC 94 116*   BUN 26 20   CR 1.07 0.90   NIDHI 8.6 9.3     Liver Function Studies -   Recent Labs   Lab Test 08/13/19  0807   PROTTOTAL 7.3   ALBUMIN 3.9   BILITOTAL 0.4   ALKPHOS 64   AST 12   ALT 27     CT CAP - August - stable disease.    IMPRESSION/PLAN:  1. Metastatic esophogeal adenocarcinoma. He has had treatment with FOLFOX and then  transitioned to Taxol with ramicurimab.  He was then on crizotinib on the [employer] Match study.  He progressed on this and returned on taxol/cyramza. And is now on Keytruda.    I am very pleased with how he is doing on Keytruda.  He did develop grade 3 arthralgias and is now on prednisone and Enbrel.  He is being followed by rheumatology.  We discussed that I'd like to see how he does off of the prednisone.    Reviewed with several colleagues duration of Keytruda.  For now, will continue current plan of care.    Will plan to repeat imaging in December.    We discussed that he was screened for entrectinib and does not have identifiable mutations for this medication.    Other treatment options would include possible irinotecan or consideration of a phase 1 study (NK FATE)     A history of PE.  He is on Lovenox twice daily.  He has progressed through Xarelto in the past.      He does have baseline neuropathy and is on gabapentin 600/300/600 mg daily.           He understands his therapies are not curative intent but rather palliative.  Additional supportive care measures are discussed.      Kadi Dee MD

## 2019-09-03 NOTE — LETTER
9/3/2019       RE: Reuben Padilla  [address] [place] [id number]     Dear Colleague,    Thank you for referring your patient, Reuben Padlila, to the [facility]. Please see a copy of my visit note below.    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 His tumor is positive for cytokeratin 20, negative for P63 and CK7, and HER2 is negative. He started on FOLFOX (5FU/oxaliplatin) on 5/15/2017. He had a delay in treatment from 9/5-10/2/17 due to work related injury.    He had an excellent response by imaging throughout the summer 2017.    He a mixed response by imaging in late fall 2017.    In January 2018, he was switched to taxol and cyramza - had slight progression and neuropathy and clinical trial became available.       In March 2018, he was started on the [facility] Match Clinical Trial with crizotinib.  (MET amplification) He remained on crizotinib from March - July 2018.    August 2018, he was switched back to taxol/cramza.  In October, he was switched to Keytruda (PD1 overexpressor).    In April 2019, his infusion was held for three weeks, and he was treated with prednisone for grade 3 arthralgias.  Keytruda was resumed.  He is now seeing rheumatology and is tapering off of prednisone.  He is also on Enbrel.    INTERVAL HISTORY: Levi comes in today for followup. He is now on Keytruda every three weeks.      He is on prednisone taper with his last day being yesterday.  He is also on enbrel.   He is feeling better and is very happy about this.  Hand/ strength is much improved.     He has had no issues with fevers or chills, no chest pain or shortness of breath, no nausea, vomiting, diarrhea or constipation.         He has no abdminal pain.  No n/v/d/c.       His energy  levels are good.       No dysphagia.    He recently had cellulitis in his right lower extremity and was treated with clindamycin.        ROS: 10 point ROS neg other than the symptoms noted above in the HPI.    PHYSICAL EXAMINATION  There were no vitals taken for this visit.   Wt Readings from Last 4 Encounters:   08/13/19 (!) 158.2 kg (348 lb 12.8 oz)   07/22/19 (!) 154.2 kg (340 lb)   07/01/19 (!) 153.3 kg (338 lb)   06/11/19 (!) 152.7 kg (336 lb 9.6 oz)     Constitutional: Alert, oriented male in no visible distress.  Eyes: PERRL. Anicteric sclerae.  ENT/Mouth: OM moist and pink without lesions or thrush.  CV: RRR  Resp: CTAB throughout  Abdomen: Soft, non-tender, non-distended. Obese. Bowel sounds present. Unable to palpate liver or spleen.  No RLQ tenderness.  Extremities: trace edema , no erythema or warmth over joints  Skin: Warm, dry. Hyperpigmentation in his lower extremities R > L, no warmth  Lymph: No cervical or supraclavicular lymphadenopathy appreciated.   Neuro: CN II-XII grossly intact.     MUSC: no erythema or warmth over shoulders, DIPs, PIPs.  Hand strength normal  ECOG PS: 1             Lab Results   Component Value Date    WBC 6.9 08/13/2019     Lab Results   Component Value Date    RBC 4.76 08/13/2019     Lab Results   Component Value Date    HGB 14.4 08/13/2019     Lab Results   Component Value Date    HCT 44.0 08/13/2019     No components found for: MCT  Lab Results   Component Value Date    MCV 92 08/13/2019     Lab Results   Component Value Date    MCH 30.3 08/13/2019     Lab Results   Component Value Date    MCHC 32.7 08/13/2019     Lab Results   Component Value Date    RDW 14.2 08/13/2019     Lab Results   Component Value Date     08/13/2019       Recent Labs   Lab Test 08/13/19  0807 07/22/19  0823    138   POTASSIUM 3.8 4.0   CHLORIDE 109 109   CO2 26 23   ANIONGAP 6 6   GLC 94 116*   BUN 26 20   CR 1.07 0.90   NIDHI 8.6 9.3     Liver Function Studies -   Recent Labs   Lab  Test 08/13/19  0807   PROTTOTAL 7.3   ALBUMIN 3.9   BILITOTAL 0.4   ALKPHOS 64   AST 12   ALT 27     CT CAP - August - stable disease.    IMPRESSION/PLAN:  1. Metastatic esophogeal adenocarcinoma. He has had treatment with FOLFOX and then transitioned to Taxol with ramicurimab.  He was then on crizotinib on the [employer] Match study.  He progressed on this and returned on taxol/cyramza. And is now on Keytruda.    I am very pleased with how he is doing on Keytruda.  He did develop grade 3 arthralgias and is now on prednisone and Enbrel.  He is being followed by rheumatology.  We discussed that I'd like to see how he does off of the prednisone.    Reviewed with several colleagues duration of Keytruda.  For now, will continue current plan of care.    Will plan to repeat imaging in December.    We discussed that he was screened for entrectinib and does not have identifiable mutations for this medication.    Other treatment options would include possible irinotecan or consideration of a phase 1 study (NK FATE)     A history of PE.  He is on Lovenox twice daily.  He has progressed through Xarelto in the past.      He does have baseline neuropathy and is on gabapentin 600/300/600 mg daily.           He understands his therapies are not curative intent but rather palliative.  Additional supportive care measures are discussed.      Kadi Dee MD

## 2019-09-11 ENCOUNTER — MYC MEDICAL ADVICE (OUTPATIENT)
Dept: ONCOLOGY | Facility: CLINIC | Age: 59
End: 2019-09-11

## 2019-09-11 DIAGNOSIS — M06.00 SERONEGATIVE RHEUMATOID ARTHRITIS (H): Primary | ICD-10-CM

## 2019-09-11 NOTE — TELEPHONE ENCOUNTER
"No fevers/chills, cough, sore throat, congestion, SOB, edema, nausea, constipation, abdominal pain, rash, or worsening fatigue.     Pt describes pain as increasing all over, making difficult to get up or use hands. Pain rated 5-6/10 when closing hand. Pt took 2 Oxycodone today and \"I have not taken that in a while\". Pt had 1 bout of diarrhea \"the other day\" and some nausea last night that has resolved.    Pt has been on chemo for \"years\" and believes this is related to Keytruda. Wanted to keep care team in the loop and see if there are any recommendations.      "

## 2019-09-12 NOTE — TELEPHONE ENCOUNTER
Pt called to update that he had not heard from Rheumatology. He state he is coming to [facility] 9/13 around 11 am for an eye appointment and if team wants to see him please call him before he leaves for home again. Stated today he feels slightly better than yesterday, rate R hand pain as 4/10 and has not taken any Oxy today. Does have generalized body aches worse than usual. He has decided to take his last dose of Enbrel tomorrow after all. State he has plenty of prednisone at home if team decides to put him back on it.     Informed pt Nicole HILL will be back in the office Friday, will call him in the morning. Can see that Dr. Singer routed messages to Rheum care team so they may be calling him soon. Pt verbalized understanding he will wait for call tomorrow.

## 2019-09-12 NOTE — TELEPHONE ENCOUNTER
"Per Nicole HILL: \"...he might need a prednisone burst again.  Like 60 mg x 7, 40 mg x7, 20 mg x7, 10 mg x7 --but we should also check with dR Singer- I am not sure if she had other treatment options in mind.\"    nicole     Writer called to Dr. Singer's office, was told they will reach out to pt by this afternoon, will send a message to Dr. Singer for response. Pt updated via [place], asked to call [employer] back if he has not heard by end of day.  "

## 2019-09-13 ENCOUNTER — TELEPHONE (OUTPATIENT)
Dept: RHEUMATOLOGY | Facility: CLINIC | Age: 59
End: 2019-09-13

## 2019-09-13 RX ORDER — PREDNISONE 10 MG/1
TABLET ORAL
Qty: 60 TABLET | Refills: 0 | Status: SHIPPED | OUTPATIENT
Start: 2019-09-13 | End: 2019-10-02

## 2019-09-13 NOTE — TELEPHONE ENCOUNTER
PA Initiation    Medication: HUMIRA   Insurance Company: [employer] - Phone [phone] Fax [phone]  Pharm[facility] Filling the Rx: [facility] [address]  Filling Pharmacy Phone:    Filling Pharmacy Fax:    Start Date: 9/13/2019    MELVINA [id number]

## 2019-09-13 NOTE — TELEPHONE ENCOUNTER
Spoke with pt, he is being switched to Humira by Rheumatology. They agreed with prednisone burst but did not give him directions. Pt state he still has #30 tablets of 10 mg left. Per Nicole, pt to start 60 mg x7 days, 40 mg x7 days, 20 mg x7 days then 10 mg x7 days. Will send #60 more tablets to [facility]. Pt has follow-up with Nicole HILL on 9/23 advised to call back if symptoms not improving, he verbalized understanding.

## 2019-09-20 DIAGNOSIS — C78.7 MALIGNANT NEOPLASM METASTATIC TO LIVER (H): Primary | ICD-10-CM

## 2019-09-23 ENCOUNTER — INFUSION THERAPY VISIT (OUTPATIENT)
Dept: ONCOLOGY | Facility: CLINIC | Age: 59
End: 2019-09-23
Attending: INTERNAL MEDICINE
Payer: COMMERCIAL

## 2019-09-23 ENCOUNTER — APPOINTMENT (OUTPATIENT)
Dept: LAB | Facility: CLINIC | Age: 59
End: 2019-09-23
Attending: PHYSICIAN ASSISTANT
Payer: COMMERCIAL

## 2019-09-23 ENCOUNTER — ONCOLOGY VISIT (OUTPATIENT)
Dept: ONCOLOGY | Facility: CLINIC | Age: 59
End: 2019-09-23
Attending: PHYSICIAN ASSISTANT
Payer: COMMERCIAL

## 2019-09-23 VITALS
TEMPERATURE: 98.5 F | RESPIRATION RATE: 18 BRPM | DIASTOLIC BLOOD PRESSURE: 89 MMHG | OXYGEN SATURATION: 98 % | HEART RATE: 89 BPM | BODY MASS INDEX: 40.79 KG/M2 | WEIGHT: 315 LBS | SYSTOLIC BLOOD PRESSURE: 136 MMHG

## 2019-09-23 DIAGNOSIS — C15.5 CANCER OF DISTAL THIRD OF ESOPHAGUS (H): Primary | ICD-10-CM

## 2019-09-23 DIAGNOSIS — C78.7 MALIGNANT NEOPLASM METASTATIC TO LIVER (H): ICD-10-CM

## 2019-09-23 LAB
ALBUMIN SERPL-MCNC: 3.7 G/DL (ref 3.4–5)
ALP SERPL-CCNC: 62 U/L (ref 40–150)
ALT SERPL W P-5'-P-CCNC: 25 U/L (ref 0–70)
ANION GAP SERPL CALCULATED.3IONS-SCNC: 6 MMOL/L (ref 3–14)
AST SERPL W P-5'-P-CCNC: 8 U/L (ref 0–45)
BASOPHILS # BLD AUTO: 0 10E9/L (ref 0–0.2)
BASOPHILS NFR BLD AUTO: 0.2 %
BILIRUB DIRECT SERPL-MCNC: 0.1 MG/DL (ref 0–0.2)
BILIRUB SERPL-MCNC: 0.6 MG/DL (ref 0.2–1.3)
BUN SERPL-MCNC: 23 MG/DL (ref 7–30)
CALCIUM SERPL-MCNC: 9.1 MG/DL (ref 8.5–10.1)
CHLORIDE SERPL-SCNC: 107 MMOL/L (ref 94–109)
CO2 SERPL-SCNC: 26 MMOL/L (ref 20–32)
CREAT SERPL-MCNC: 0.82 MG/DL (ref 0.66–1.25)
DIFFERENTIAL METHOD BLD: ABNORMAL
EOSINOPHIL # BLD AUTO: 0 10E9/L (ref 0–0.7)
EOSINOPHIL NFR BLD AUTO: 0 %
ERYTHROCYTE [DISTWIDTH] IN BLOOD BY AUTOMATED COUNT: 13.2 % (ref 10–15)
GFR SERPL CREATININE-BSD FRML MDRD: >90 ML/MIN/{1.73_M2}
GLUCOSE SERPL-MCNC: 121 MG/DL (ref 70–99)
HCT VFR BLD AUTO: 44.5 % (ref 40–53)
HGB BLD-MCNC: 15.1 G/DL (ref 13.3–17.7)
IMM GRANULOCYTES # BLD: 0.2 10E9/L (ref 0–0.4)
IMM GRANULOCYTES NFR BLD: 1.7 %
LYMPHOCYTES # BLD AUTO: 0.8 10E9/L (ref 0.8–5.3)
LYMPHOCYTES NFR BLD AUTO: 7.6 %
MCH RBC QN AUTO: 30.6 PG (ref 26.5–33)
MCHC RBC AUTO-ENTMCNC: 33.9 G/DL (ref 31.5–36.5)
MCV RBC AUTO: 90 FL (ref 78–100)
MONOCYTES # BLD AUTO: 0.3 10E9/L (ref 0–1.3)
MONOCYTES NFR BLD AUTO: 2.4 %
NEUTROPHILS # BLD AUTO: 9.5 10E9/L (ref 1.6–8.3)
NEUTROPHILS NFR BLD AUTO: 88.1 %
NRBC # BLD AUTO: 0 10*3/UL
NRBC BLD AUTO-RTO: 0 /100
PLATELET # BLD AUTO: 160 10E9/L (ref 150–450)
POTASSIUM SERPL-SCNC: 4 MMOL/L (ref 3.4–5.3)
PROT SERPL-MCNC: 7.4 G/DL (ref 6.8–8.8)
RBC # BLD AUTO: 4.93 10E12/L (ref 4.4–5.9)
SODIUM SERPL-SCNC: 139 MMOL/L (ref 133–144)
TSH SERPL DL<=0.005 MIU/L-ACNC: 0.78 MU/L (ref 0.4–4)
WBC # BLD AUTO: 10.8 10E9/L (ref 4–11)

## 2019-09-23 PROCEDURE — 25000128 H RX IP 250 OP 636: Mod: ZF | Performed by: PHYSICIAN ASSISTANT

## 2019-09-23 PROCEDURE — 80053 COMPREHEN METABOLIC PANEL: CPT | Performed by: PHYSICIAN ASSISTANT

## 2019-09-23 PROCEDURE — 82248 BILIRUBIN DIRECT: CPT | Performed by: PHYSICIAN ASSISTANT

## 2019-09-23 PROCEDURE — 84443 ASSAY THYROID STIM HORMONE: CPT | Performed by: PHYSICIAN ASSISTANT

## 2019-09-23 PROCEDURE — 85025 COMPLETE CBC W/AUTO DIFF WBC: CPT | Performed by: INTERNAL MEDICINE

## 2019-09-23 PROCEDURE — 99214 OFFICE O/P EST MOD 30 MIN: CPT | Mod: ZP | Performed by: PHYSICIAN ASSISTANT

## 2019-09-23 PROCEDURE — 25800030 ZZH RX IP 258 OP 636: Mod: ZF | Performed by: PHYSICIAN ASSISTANT

## 2019-09-23 PROCEDURE — 96413 CHEMO IV INFUSION 1 HR: CPT

## 2019-09-23 PROCEDURE — G0463 HOSPITAL OUTPT CLINIC VISIT: HCPCS | Mod: ZF

## 2019-09-23 RX ORDER — HEPARIN SODIUM (PORCINE) LOCK FLUSH IV SOLN 100 UNIT/ML 100 UNIT/ML
500 SOLUTION INTRAVENOUS ONCE
Status: COMPLETED | OUTPATIENT
Start: 2019-09-23 | End: 2019-09-23

## 2019-09-23 RX ORDER — ALBUTEROL SULFATE 0.83 MG/ML
2.5 SOLUTION RESPIRATORY (INHALATION)
Status: CANCELLED | OUTPATIENT
Start: 2019-09-23

## 2019-09-23 RX ORDER — MEPERIDINE HYDROCHLORIDE 25 MG/ML
25 INJECTION INTRAMUSCULAR; INTRAVENOUS; SUBCUTANEOUS EVERY 30 MIN PRN
Status: CANCELLED | OUTPATIENT
Start: 2019-09-23

## 2019-09-23 RX ORDER — METHYLPREDNISOLONE SODIUM SUCCINATE 125 MG/2ML
125 INJECTION, POWDER, LYOPHILIZED, FOR SOLUTION INTRAMUSCULAR; INTRAVENOUS
Status: CANCELLED
Start: 2019-09-23

## 2019-09-23 RX ORDER — EPINEPHRINE 1 MG/ML
0.3 INJECTION, SOLUTION INTRAMUSCULAR; SUBCUTANEOUS EVERY 5 MIN PRN
Status: CANCELLED | OUTPATIENT
Start: 2019-09-23

## 2019-09-23 RX ORDER — LORAZEPAM 2 MG/ML
0.5 INJECTION INTRAMUSCULAR EVERY 4 HOURS PRN
Status: CANCELLED
Start: 2019-09-23

## 2019-09-23 RX ORDER — ALBUTEROL SULFATE 90 UG/1
1-2 AEROSOL, METERED RESPIRATORY (INHALATION)
Status: CANCELLED
Start: 2019-09-23

## 2019-09-23 RX ORDER — DIPHENHYDRAMINE HYDROCHLORIDE 50 MG/ML
50 INJECTION INTRAMUSCULAR; INTRAVENOUS
Status: CANCELLED
Start: 2019-09-23

## 2019-09-23 RX ORDER — EPINEPHRINE 0.3 MG/.3ML
0.3 INJECTION SUBCUTANEOUS EVERY 5 MIN PRN
Status: CANCELLED | OUTPATIENT
Start: 2019-09-23

## 2019-09-23 RX ORDER — HEPARIN SODIUM (PORCINE) LOCK FLUSH IV SOLN 100 UNIT/ML 100 UNIT/ML
500 SOLUTION INTRAVENOUS ONCE
Status: CANCELLED | OUTPATIENT
Start: 2019-09-23

## 2019-09-23 RX ORDER — HEPARIN SODIUM (PORCINE) LOCK FLUSH IV SOLN 100 UNIT/ML 100 UNIT/ML
5 SOLUTION INTRAVENOUS EVERY 8 HOURS
Status: DISCONTINUED | OUTPATIENT
Start: 2019-09-23 | End: 2019-09-23 | Stop reason: HOSPADM

## 2019-09-23 RX ORDER — SODIUM CHLORIDE 9 MG/ML
1000 INJECTION, SOLUTION INTRAVENOUS CONTINUOUS PRN
Status: CANCELLED
Start: 2019-09-23

## 2019-09-23 RX ADMIN — SODIUM CHLORIDE 250 ML: 9 INJECTION, SOLUTION INTRAVENOUS at 12:23

## 2019-09-23 RX ADMIN — SODIUM CHLORIDE 200 MG: 9 INJECTION, SOLUTION INTRAVENOUS at 12:41

## 2019-09-23 RX ADMIN — HEPARIN 5 ML: 100 SYRINGE at 10:42

## 2019-09-23 RX ADMIN — HEPARIN 500 UNITS: 100 SYRINGE at 13:20

## 2019-09-23 ASSESSMENT — PAIN SCALES - GENERAL: PAINLEVEL: NO PAIN (0)

## 2019-09-23 NOTE — PROGRESS NOTES
Infusion Nursing Note:  Reuben Padilla presents today for C15 D1 Keytruda.    Patient seen by provider today: Yes: SERGIO Barker   present during visit today: Not Applicable.    Note: No complaints or concerns following visit with BERENICE.    Intravenous Access:  Implanted Port.    Treatment Conditions:  Lab Results   Component Value Date     09/23/2019                   Lab Results   Component Value Date    POTASSIUM 4.0 09/23/2019           Lab Results   Component Value Date    MAG 1.9 11/07/2018            Lab Results   Component Value Date    CR 0.82 09/23/2019                   Lab Results   Component Value Date    NIDHI 9.1 09/23/2019                Lab Results   Component Value Date    BILITOTAL 0.6 09/23/2019           Lab Results   Component Value Date    ALBUMIN 3.7 09/23/2019                    Lab Results   Component Value Date    ALT 25 09/23/2019           Lab Results   Component Value Date    AST 8 09/23/2019       Results reviewed, labs MET treatment parameters, ok to proceed with treatment.      Post Infusion Assessment:  Patient tolerated infusion without incident.  Blood return noted pre and post infusion.  Site patent and intact, free from redness, edema or discomfort.  No evidence of extravasations.  Access discontinued per protocol.    Discharge Plan:   Patient declined prescription refills.  Discharge instructions reviewed with: Patient and wife.  Patient and/or family verbalized understanding of discharge instructions and all questions answered.  AVS to patient via [email or web address].  Patient will return 10/14 for next appointment.   Patient discharged in stable condition accompanied by: wife.  Departure Mode: Ambulatory.    Carley Robles RN

## 2019-09-23 NOTE — PROGRESS NOTES
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 His tumor is positive for cytokeratin 20, negative for P63 and CK7, and HER2 is negative.     5/15/17--He started on FOLFOX (5FU/oxaliplatin). He had a delay in treatment from 9/5-10/2/17 due to work related injury. Excellent response by imaging throughout the summer 2017 and then mixed response by imaging in late fall 2017.    1/8/18, Taxol and cyramza - had slight progression and neuropathy and clinical trial became available.     3/2018  [facility] Match Clinical Trial with crizotinib.  (MET amplification) progression 7/2018.  7/24/18 switched back to taxol/cramza, eventual progression  10/23/18, he was switched to Keytruda (PD1 overexpressor).  4/2019, his infusion was held for three weeks, and he was treated with prednisone for grade 3 arthralgias.  5/20/19 resumed Keytruda and started Enbrel (remained on low dose prednisone)    INTERVAL HISTORY: Levi comes in today for followup with his sister. On 9/2 he stopped his steroids completely (he had been on 5 mg every other day), he could tell the first week being completely off that the Enbrel was not going to be enough to keep his symptoms at bay.  Throughout the week his pain escalated and the stiffness in his hands R>L. He stopped the Enbrel as he was frustrated that it was not enough to control his symptoms off of the steroids.   He called on 9/11 to discuss with oncology and Rheum.   We started 60 mg back up of pred and Rheum worked on getting Humara approved, he has not started this yet.  Within a week of the 60 mg he is feeling well- pain is gone, no need for oxycodone and his stiffness is better.  He feels back to his \"baseline\" with controlled symptoms and able to do all his ADLs.  He had some skin pain on the " right calf and was worried he was going to get cellulitis again- had a course of clinda in May and then Keflex in June. However the skin pain resolved, he is doing fine.       He has had no issues with fevers or chills, no chest pain or shortness of breath, no nausea, vomiting, or constipation.  Occasional diarrhea. He has no abdminal pain.        His energy levels are good.  Mood is good. Sleep is good on amitriptyline.     No dysphagia.        ROS: 10 point ROS neg other than the symptoms noted above in the HPI.    PHYSICAL EXAMINATION  /89 (BP Location: Right arm, Patient Position: Sitting, Cuff Size: Adult Regular)   Pulse 89   Temp 98.5  F (36.9  C) (Oral)   Resp 18   Wt (!) 160.1 kg (353 lb)   SpO2 98%   BMI 40.79 kg/m     Wt Readings from Last 4 Encounters:   09/23/19 (!) 160.1 kg (353 lb)   09/03/19 (!) 158 kg (348 lb 4.8 oz)   08/13/19 (!) 158.2 kg (348 lb 12.8 oz)   07/22/19 (!) 154.2 kg (340 lb)     Constitutional: Alert, oriented male in no visible distress.  Eyes: PERRL. Anicteric sclerae.  ENT/Mouth: OM moist and pink without lesions or thrush.  CV: RRR  Resp: CTAB throughout  Abdomen: Soft, non-tender, non-distended. Obese. Bowel sounds present. Unable to palpate liver or spleen.  No RLQ tenderness.  Extremities: trace edema , no erythema or warmth over joints  Skin: Warm, dry. Right lower leg show vascular changes with hyperpigmentation. No redness or warmth. Left leg without hyperpigmentation or vascular changes  Lymph: No cervical or supraclavicular lymphadenopathy appreciated.   Neuro: CN II-XII grossly intact.     MUSC: no erythema or warmth over shoulders, DIPs, PIPs.  Hand strength 4/5  Thenar wasting improved R hand.   Right foot, missing big toe, has a small 2 mm ulcer on his forth toe base   ECOG PS: 1          7/22/2019 08:23 8/13/2019 08:07 9/3/2019 09:34 9/23/2019 10:45   WBC 6.8 6.9 5.1 10.8   Hemoglobin 15.2 14.4 14.7 15.1   Hematocrit 45.4 44.0 44.6 44.5   Platelet Count  136 (L) 170 180 160   RBC Count 4.97 4.76 4.83 4.93   MCV 91 92 92 90   MCH 30.6 30.3 30.4 30.6   MCHC 33.5 32.7 33.0 33.9   RDW 13.9 14.2 14.2 13.2   Diff Method Automated Method Automated Method Automated Method Automated Method   % Neutrophils 80.7 54.7 54.1 88.1   % Lymphocytes 12.8 36.5 35.4 7.6   % Monocytes 5.2 6.9 7.1 2.4   % Eosinophils 0.4 0.9 1.2 0.0   % Basophils 0.3 0.6 0.8 0.2   % Immature Granulocytes 0.6 0.4 1.4 1.7   Nucleated RBCs 0 0 0 0   Absolute Neutrophil 5.5 3.8 2.8 9.5 (H)      9/23/2019 10:45   Sodium 139   Potassium 4.0   Chloride 107   Carbon Dioxide 26   Urea Nitrogen 23   Creatinine 0.82   GFR Estimate >90   GFR Estimate If Black >90   Calcium 9.1   Anion Gap 6   Albumin 3.7   Protein Total 7.4   Bilirubin Total 0.6   Alkaline Phosphatase 62   ALT 25   AST 8   Bilirubin Direct 0.1   TSH 0.78   Glucose 121 (H)       IMPRESSION/PLAN:  1. Metastatic esophogeal adenocarcinoma. He has had treatment with FOLFOX, then Taxol with ramicurimab.  He was then on crizotinib on the NCI Match study.  He progressed on this and returned on taxol/cyramza.  Levi started Keytruda in October of 2018 and has had regression and stable disease.  Despite taking a break for inflammatory arthritis. Levi had another flare, a grade 2 the week of 9/11 due to tapering off the steroids completely.  He is going to start taking the Humera this week.  Given one week of 60 mg was enough to completely resolve his pain and get his stiffness back to baseline, I will shorten his taper to 5 days of 40 mg and 5 days of 20 mg.  I think he should stay at the 10 mg at least through the first month of the Humara until we know if it will be effective.  For now, given this is a grade 2 last week and now a grade 1, we'll continue on the Keytruda.  However if the symptoms don't respond to the Humera, we may need to re-discuss this.     Rheum: see prior notes.  Now on pred taper and starting Humera this week.     HEME: A history of  PE.  He is on Lovenox twice daily-has been compliant. Last Xa was therapeutic   Encouraged diet and exercise control for his weight gain on prednisone.      Neuro: He does have baseline neuropathy and is on gabapentin 600/300/600 mg daily.   This is stable      ID: recent cellulitis in May and June, none today.  Seeing podiatry tomorrow for a sore on his right foot.     SLEEP: insomnia related to steroids, amitriptyline helping, not needing ativan or Ambien.      Nicole Sutherland PA-C

## 2019-09-23 NOTE — PATIENT INSTRUCTIONS
Contact Numbers    [facility] (for Scheduling/Triage/After Hours Nurse Line): [phone]    Please call the [facility] nurse triage or the after hours nurse line if you experience a temperature greater than or equal to 100.5, shaking chills, have uncontrolled nausea, vomiting and/or diarrhea, dizziness, lightheadedness, shortness of breath, chest pain, bleeding, unexplained bruising, or if you have any other new/concerning symptoms, questions or concerns.     If you are having any concerning symptoms or wish to speak to a provider before your next infusion visit, please call your care coordinator or triage to notify them so we can adequately serve you.     If you need a refill on a narcotic prescription or other medication, please call triage before your infusion appointment.       September 2019 Sunday Monday Tuesday Wednesday Thursday Friday Saturday   1     2     3    [facility] DRAW   8:45 AM   (15 min.)    [facility] DRAW   [facility] Lab Draw    [facility] RETURN   9:15 AM   (30 min.)   Kadi Dee MD   [facility]    [facility] RETURN   9:45 AM   (30 min.)   Michelle Singer MD   [facility] Rheumatology    [facility] ONC INFUSION 60  10:00 AM   (60 min.)    ONCOLOGY INFUSION   [facility] 4     5     6     7       8     9     10     11     12     13     14       15     16     17     18     19     20     21       22     23    [facility] DRAW  10:15 AM   (15 min.)    [facility] DRAW   [facility] Lab Draw    [facility] RETURN  10:45 AM   (50 min.)   Loraine Sutherland PA-C   [facility] ONC INFUSION 60  12:00 PM   (60 min.)    ONCOLOGY INFUSION   [facility] 24     25     26     27     28       29     30 October 2019 Sunday Monday Tuesday Wednesday Thursday Friday Saturday             1     2     3     4     5       6     7     8     9     10     11     12        13     14    [facility]   8[facility]30 AM   (15 min.)   [facility] LAB DRAW   [facility] Lab Draw    [facility] RETURN   9:05 AM   (50 min.)   Loraine Sutherland PA-C   [facility] ONC INFUSION 60  10:30 AM   (60 min.)    ONCOLOGY INFUSION   [facility] 15     16     17     18     19       20     21     22     23     24     25     26       27     28     29     30     31                               Lab Results:  Recent Results (from the past 12 hour(s))   CBC with platelets differential    Collection Time: 09/23/19 10:45 AM   Result Value Ref Range    WBC 10.8 4.0 - 11.0 10e9/L    RBC Count 4.93 4.4 - 5.9 10e12/L    Hemoglobin 15.1 13.3 - 17.7 g/dL    Hematocrit 44.5 40.0 - 53.0 %    MCV 90 78 - 100 fl    MCH 30.6 26.5 - 33.0 pg    MCHC 33.9 31.5 - 36.5 g/dL    RDW 13.2 10.0 - 15.0 %    Platelet Count 160 150 - 450 10e9/L    Diff Method Automated Method     % Neutrophils 88.1 %    % Lymphocytes 7.6 %    % Monocytes 2.4 %    % Eosinophils 0.0 %    % Basophils 0.2 %    % Immature Granulocytes 1.7 %    Nucleated RBCs 0 0 /100    Absolute Neutrophil 9.5 (H) 1.6 - 8.3 10e9/L    Absolute Lymphocytes 0.8 0.8 - 5.3 10e9/L    Absolute Monocytes 0.3 0.0 - 1.3 10e9/L    Absolute Eosinophils 0.0 0.0 - 0.7 10e9/L    Absolute Basophils 0.0 0.0 - 0.2 10e9/L    Abs Immature Granulocytes 0.2 0 - 0.4 10e9/L    Absolute Nucleated RBC 0.0    TSH with free T4 reflex    Collection Time: 09/23/19 10:45 AM   Result Value Ref Range    TSH 0.78 0.40 - 4.00 mU/L   Comprehensive metabolic panel    Collection Time: 09/23/19 10:45 AM   Result Value Ref Range    Sodium 139 133 - 144 mmol/L    Potassium 4.0 3.4 - 5.3 mmol/L    Chloride 107 94 - 109 mmol/L    Carbon Dioxide 26 20 - 32 mmol/L    Anion Gap 6 3 - 14 mmol/L    Glucose 121 (H) 70 - 99 mg/dL    Urea Nitrogen 23 7 - 30 mg/dL    Creatinine 0.82 0.66 - 1.25 mg/dL    GFR Estimate >90 >60 mL/min/[1.73_m2]    GFR Estimate If Black >90 >60  mL/min/[1.73_m2]    Calcium 9.1 8.5 - 10.1 mg/dL    Bilirubin Total 0.6 0.2 - 1.3 mg/dL    Albumin 3.7 3.4 - 5.0 g/dL    Protein Total 7.4 6.8 - 8.8 g/dL    Alkaline Phosphatase 62 40 - 150 U/L    ALT 25 0 - 70 U/L    AST 8 0 - 45 U/L   Bilirubin direct    Collection Time: 09/23/19 10:45 AM   Result Value Ref Range    Bilirubin Direct 0.1 0.0 - 0.2 mg/dL

## 2019-09-23 NOTE — NURSING NOTE
Chief Complaint   Patient presents with     Oncology Clinic Visit     Return Esophageal Ca     Port Draw     Labs drawn via PORT by RN in lab. Line flushed with saline and heparin. VS taken.      Ren Kline RN

## 2019-09-23 NOTE — NURSING NOTE
"Oncology Rooming Note    September 23, 2019 10:56 AM   Reuben Padilla is a 59 year old male who presents for:    Chief Complaint   Patient presents with     Oncology Clinic Visit     Return Esophageal Ca     Port Draw     Labs drawn via PORT by RN in lab. Line flushed with saline and heparin. VS taken.      Initial Vitals: /89 (BP Location: Right arm, Patient Position: Sitting, Cuff Size: Adult Regular)   Pulse 89   Temp 98.5  F (36.9  C) (Oral)   Resp 18   Wt (!) 160.1 kg (353 lb)   SpO2 98%   BMI 40.79 kg/m   Estimated body mass index is 40.79 kg/m  as calculated from the following:    Height as of 7/22/19: 1.981 m (6' 6\").    Weight as of this encounter: 160.1 kg (353 lb). Body surface area is 2.97 meters squared.  No Pain (0) Comment: Data Unavailable   No LMP for male patient.  Allergies reviewed: Yes  Medications reviewed: Yes    Medications: Medication refills not needed today.  Pharmacy name entered into [facility]:    [id number] - [place], [place] - [id number] - [place], [place] - [address]    Clinical concerns: No new concerns.         Ambika Robert CMA              "

## 2019-10-01 ENCOUNTER — HEALTH MAINTENANCE LETTER (OUTPATIENT)
Age: 59
End: 2019-10-01

## 2019-10-02 DIAGNOSIS — M06.00 SERONEGATIVE RHEUMATOID ARTHRITIS (H): ICD-10-CM

## 2019-10-02 RX ORDER — PREDNISONE 10 MG/1
10 TABLET ORAL DAILY
Qty: 14 TABLET | Refills: 0 | Status: SHIPPED | OUTPATIENT
Start: 2019-10-02 | End: 2019-10-14

## 2019-10-07 DIAGNOSIS — C15.5 CANCER OF DISTAL THIRD OF ESOPHAGUS (H): ICD-10-CM

## 2019-10-07 DIAGNOSIS — I26.99 ACUTE PULMONARY EMBOLISM (H): ICD-10-CM

## 2019-10-14 ENCOUNTER — ONCOLOGY VISIT (OUTPATIENT)
Dept: ONCOLOGY | Facility: CLINIC | Age: 59
End: 2019-10-14
Attending: INTERNAL MEDICINE
Payer: COMMERCIAL

## 2019-10-14 ENCOUNTER — APPOINTMENT (OUTPATIENT)
Dept: LAB | Facility: CLINIC | Age: 59
End: 2019-10-14
Attending: INTERNAL MEDICINE
Payer: COMMERCIAL

## 2019-10-14 VITALS
OXYGEN SATURATION: 96 % | DIASTOLIC BLOOD PRESSURE: 74 MMHG | HEART RATE: 85 BPM | SYSTOLIC BLOOD PRESSURE: 128 MMHG | BODY MASS INDEX: 36.45 KG/M2 | RESPIRATION RATE: 24 BRPM | HEIGHT: 78 IN | WEIGHT: 315 LBS | TEMPERATURE: 97.7 F

## 2019-10-14 DIAGNOSIS — M19.90 INFLAMMATORY ARTHRITIS: ICD-10-CM

## 2019-10-14 DIAGNOSIS — T45.1X5A CHEMOTHERAPY-INDUCED NEUTROPENIA (H): ICD-10-CM

## 2019-10-14 DIAGNOSIS — C15.5 CANCER OF DISTAL THIRD OF ESOPHAGUS (H): Primary | ICD-10-CM

## 2019-10-14 DIAGNOSIS — D70.1 CHEMOTHERAPY-INDUCED NEUTROPENIA (H): ICD-10-CM

## 2019-10-14 DIAGNOSIS — Z23 NEED FOR PROPHYLACTIC VACCINATION AND INOCULATION AGAINST INFLUENZA: Primary | ICD-10-CM

## 2019-10-14 LAB
ALBUMIN SERPL-MCNC: 3.5 G/DL (ref 3.4–5)
ALP SERPL-CCNC: 64 U/L (ref 40–150)
ALT SERPL W P-5'-P-CCNC: 27 U/L (ref 0–70)
ANION GAP SERPL CALCULATED.3IONS-SCNC: 6 MMOL/L (ref 3–14)
AST SERPL W P-5'-P-CCNC: 14 U/L (ref 0–45)
BASOPHILS # BLD AUTO: 0 10E9/L (ref 0–0.2)
BASOPHILS NFR BLD AUTO: 0.5 %
BILIRUB SERPL-MCNC: 0.6 MG/DL (ref 0.2–1.3)
BUN SERPL-MCNC: 17 MG/DL (ref 7–30)
CALCIUM SERPL-MCNC: 8.6 MG/DL (ref 8.5–10.1)
CHLORIDE SERPL-SCNC: 108 MMOL/L (ref 94–109)
CO2 SERPL-SCNC: 27 MMOL/L (ref 20–32)
CREAT SERPL-MCNC: 0.83 MG/DL (ref 0.66–1.25)
DIFFERENTIAL METHOD BLD: ABNORMAL
EOSINOPHIL # BLD AUTO: 0.1 10E9/L (ref 0–0.7)
EOSINOPHIL NFR BLD AUTO: 1.4 %
ERYTHROCYTE [DISTWIDTH] IN BLOOD BY AUTOMATED COUNT: 14.3 % (ref 10–15)
GFR SERPL CREATININE-BSD FRML MDRD: >90 ML/MIN/{1.73_M2}
GLUCOSE SERPL-MCNC: 107 MG/DL (ref 70–99)
HCT VFR BLD AUTO: 42.3 % (ref 40–53)
HGB BLD-MCNC: 14.1 G/DL (ref 13.3–17.7)
IMM GRANULOCYTES # BLD: 0 10E9/L (ref 0–0.4)
IMM GRANULOCYTES NFR BLD: 0.6 %
LYMPHOCYTES # BLD AUTO: 1.7 10E9/L (ref 0.8–5.3)
LYMPHOCYTES NFR BLD AUTO: 25.9 %
MCH RBC QN AUTO: 30.4 PG (ref 26.5–33)
MCHC RBC AUTO-ENTMCNC: 33.3 G/DL (ref 31.5–36.5)
MCV RBC AUTO: 91 FL (ref 78–100)
MONOCYTES # BLD AUTO: 0.3 10E9/L (ref 0–1.3)
MONOCYTES NFR BLD AUTO: 5.1 %
NEUTROPHILS # BLD AUTO: 4.4 10E9/L (ref 1.6–8.3)
NEUTROPHILS NFR BLD AUTO: 66.5 %
NRBC # BLD AUTO: 0 10*3/UL
NRBC BLD AUTO-RTO: 0 /100
PLATELET # BLD AUTO: 128 10E9/L (ref 150–450)
POTASSIUM SERPL-SCNC: 3.9 MMOL/L (ref 3.4–5.3)
PROT SERPL-MCNC: 7.1 G/DL (ref 6.8–8.8)
RBC # BLD AUTO: 4.64 10E12/L (ref 4.4–5.9)
SODIUM SERPL-SCNC: 141 MMOL/L (ref 133–144)
TSH SERPL DL<=0.005 MIU/L-ACNC: 3.08 MU/L (ref 0.4–4)
WBC # BLD AUTO: 6.6 10E9/L (ref 4–11)

## 2019-10-14 PROCEDURE — G0463 HOSPITAL OUTPT CLINIC VISIT: HCPCS | Mod: ZF

## 2019-10-14 PROCEDURE — G0008 ADMIN INFLUENZA VIRUS VAC: HCPCS

## 2019-10-14 PROCEDURE — 25800030 ZZH RX IP 258 OP 636: Mod: ZF | Performed by: PHYSICIAN ASSISTANT

## 2019-10-14 PROCEDURE — 25000128 H RX IP 250 OP 636: Mod: ZF | Performed by: PHYSICIAN ASSISTANT

## 2019-10-14 PROCEDURE — 99214 OFFICE O/P EST MOD 30 MIN: CPT | Mod: ZP | Performed by: PHYSICIAN ASSISTANT

## 2019-10-14 PROCEDURE — 80053 COMPREHEN METABOLIC PANEL: CPT | Performed by: INTERNAL MEDICINE

## 2019-10-14 PROCEDURE — 85025 COMPLETE CBC W/AUTO DIFF WBC: CPT | Performed by: INTERNAL MEDICINE

## 2019-10-14 PROCEDURE — 96413 CHEMO IV INFUSION 1 HR: CPT

## 2019-10-14 PROCEDURE — 25000128 H RX IP 250 OP 636: Mod: ZF | Performed by: INTERNAL MEDICINE

## 2019-10-14 PROCEDURE — 84443 ASSAY THYROID STIM HORMONE: CPT | Performed by: INTERNAL MEDICINE

## 2019-10-14 PROCEDURE — 90686 IIV4 VACC NO PRSV 0.5 ML IM: CPT | Mod: ZF | Performed by: INTERNAL MEDICINE

## 2019-10-14 RX ORDER — EPINEPHRINE 0.3 MG/.3ML
0.3 INJECTION SUBCUTANEOUS EVERY 5 MIN PRN
Status: CANCELLED | OUTPATIENT
Start: 2019-10-14

## 2019-10-14 RX ORDER — DIPHENHYDRAMINE HYDROCHLORIDE 50 MG/ML
50 INJECTION INTRAMUSCULAR; INTRAVENOUS
Status: CANCELLED
Start: 2019-10-14

## 2019-10-14 RX ORDER — LORAZEPAM 2 MG/ML
0.5 INJECTION INTRAMUSCULAR EVERY 4 HOURS PRN
Status: CANCELLED
Start: 2019-10-14

## 2019-10-14 RX ORDER — EPINEPHRINE 1 MG/ML
0.3 INJECTION, SOLUTION INTRAMUSCULAR; SUBCUTANEOUS EVERY 5 MIN PRN
Status: CANCELLED | OUTPATIENT
Start: 2019-10-14

## 2019-10-14 RX ORDER — METHYLPREDNISOLONE SODIUM SUCCINATE 125 MG/2ML
125 INJECTION, POWDER, LYOPHILIZED, FOR SOLUTION INTRAMUSCULAR; INTRAVENOUS
Status: CANCELLED
Start: 2019-10-14

## 2019-10-14 RX ORDER — HEPARIN SODIUM (PORCINE) LOCK FLUSH IV SOLN 100 UNIT/ML 100 UNIT/ML
500 SOLUTION INTRAVENOUS ONCE
Status: CANCELLED | OUTPATIENT
Start: 2019-10-14

## 2019-10-14 RX ORDER — OXYCODONE HYDROCHLORIDE 10 MG/1
10 TABLET ORAL EVERY 4 HOURS PRN
COMMUNITY
End: 2020-01-08

## 2019-10-14 RX ORDER — HEPARIN SODIUM (PORCINE) LOCK FLUSH IV SOLN 100 UNIT/ML 100 UNIT/ML
500 SOLUTION INTRAVENOUS ONCE
Status: DISCONTINUED | OUTPATIENT
Start: 2019-10-14 | End: 2019-10-14 | Stop reason: HOSPADM

## 2019-10-14 RX ORDER — MEPERIDINE HYDROCHLORIDE 25 MG/ML
25 INJECTION INTRAMUSCULAR; INTRAVENOUS; SUBCUTANEOUS EVERY 30 MIN PRN
Status: CANCELLED | OUTPATIENT
Start: 2019-10-14

## 2019-10-14 RX ORDER — ALBUTEROL SULFATE 90 UG/1
1-2 AEROSOL, METERED RESPIRATORY (INHALATION)
Status: CANCELLED
Start: 2019-10-14

## 2019-10-14 RX ORDER — HEPARIN SODIUM (PORCINE) LOCK FLUSH IV SOLN 100 UNIT/ML 100 UNIT/ML
5 SOLUTION INTRAVENOUS EVERY 8 HOURS
Status: DISCONTINUED | OUTPATIENT
Start: 2019-10-14 | End: 2019-10-14 | Stop reason: HOSPADM

## 2019-10-14 RX ORDER — PREDNISONE 5 MG/1
5 TABLET ORAL DAILY
Qty: 30 TABLET | Refills: 1 | Status: SHIPPED | OUTPATIENT
Start: 2019-10-14 | End: 2019-11-26

## 2019-10-14 RX ORDER — ALBUTEROL SULFATE 0.83 MG/ML
2.5 SOLUTION RESPIRATORY (INHALATION)
Status: CANCELLED | OUTPATIENT
Start: 2019-10-14

## 2019-10-14 RX ORDER — SODIUM CHLORIDE 9 MG/ML
1000 INJECTION, SOLUTION INTRAVENOUS CONTINUOUS PRN
Status: CANCELLED
Start: 2019-10-14

## 2019-10-14 RX ADMIN — INFLUENZA A VIRUS A/BRISBANE/02/2018 IVR-190 (H1N1) ANTIGEN (FORMALDEHYDE INACTIVATED), INFLUENZA A VIRUS A/KANSAS/14/2017 X-327 (H3N2) ANTIGEN (FORMALDEHYDE INACTIVATED), INFLUENZA B VIRUS B/PHUKET/3073/2013 ANTIGEN (FORMALDEHYDE INACTIVATED), AND INFLUENZA B VIRUS B/MARYLAND/15/2016 BX-69A ANTIGEN (FORMALDEHYDE INACTIVATED) 0.5 ML: 15; 15; 15; 15 INJECTION, SUSPENSION INTRAMUSCULAR at 10:21

## 2019-10-14 RX ADMIN — SODIUM CHLORIDE 200 MG: 9 INJECTION, SOLUTION INTRAVENOUS at 11:10

## 2019-10-14 RX ADMIN — SODIUM CHLORIDE 250 ML: 9 INJECTION, SOLUTION INTRAVENOUS at 11:10

## 2019-10-14 RX ADMIN — HEPARIN 5 ML: 100 SYRINGE at 11:53

## 2019-10-14 RX ADMIN — HEPARIN 5 ML: 100 SYRINGE at 08:57

## 2019-10-14 ASSESSMENT — MIFFLIN-ST. JEOR: SCORE: 2595.65

## 2019-10-14 ASSESSMENT — PAIN SCALES - GENERAL: PAINLEVEL: MODERATE PAIN (4)

## 2019-10-14 NOTE — NURSING NOTE
"Oncology Rooming Note    October 14, 2019 9:05 AM   Reuben Padilla is a 59 year old male who presents for:    Chief Complaint   Patient presents with     Oncology Clinic Visit     [facility] RETURN- ESOPHAGEAL CA     Port Draw     Port labs collected by RN.      Initial Vitals: /74 (BP Location: Left arm, Patient Position: Sitting)   Pulse 85   Temp 97.7  F (36.5  C) (Oral)   Resp 24   Ht 1.984 m (6' 6.11\")   Wt (!) 164.6 kg (362 lb 12.8 oz)   SpO2 96%   BMI 41.81 kg/m   Estimated body mass index is 41.81 kg/m  as calculated from the following:    Height as of this encounter: 1.984 m (6' 6.11\").    Weight as of this encounter: 164.6 kg (362 lb 12.8 oz). Body surface area is 3.01 meters squared.  Moderate Pain (4) Comment: Data Unavailable   No LMP for male patient.  Allergies reviewed: Yes  Medications reviewed: Yes    Medications: Medication refills not needed today.  Pharmacy name entered into [employer]:    [id number] - [place], [place] - [address] [place] [address] [id number] - [place], [place] - [address]    Clinical concerns: Patient would like flu shot today. Ena was notified.      Haider Meraz LPN            "

## 2019-10-14 NOTE — PROGRESS NOTES
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 His tumor is positive for cytokeratin 20, negative for P63 and CK7, and HER2 is negative.     5/15/17--He started on FOLFOX (5FU/oxaliplatin). He had a delay in treatment from 9/5-10/2/17 due to work related injury. Excellent response by imaging throughout the summer 2017 and then mixed response by imaging in late fall 2017.    1/8/18, Taxol and cyramza - had slight progression and neuropathy and clinical trial became available.     3/2018  [facility] Match Clinical Trial with crizotinib.  (MET amplification) progression 7/2018.  7/24/18 switched back to taxol/cramza, eventual progression  10/23/18, he was switched to Keytruda (PD1 overexpressor).  4/2019, his infusion was held for three weeks, and he was treated with prednisone for grade 3 arthralgias.  5/20/19 resumed Keytruda and started Enbrel (remained on low dose prednisone)    INTERVAL HISTORY: Levi comes in today for followup with his sister. On 9/2 he stopped his steroids completely (he had been on 5 mg every other day), he could tell the first week being completely off that the Enbrel was not going to be enough to keep his symptoms at bay.  Throughout the week his pain escalated and the stiffness in his hands R>L. He stopped the Enbrel as he was frustrated that it was not enough to control his symptoms off of the steroids.   He called on 9/11 to discuss with oncology and Rheum.   We started 60 mg back up of pred and Rheum worked on getting Humara, which he started on 10/1.  Since I saw Levi last, he feels really well.  He has been down at the 10 mg of prednisone for the last two weeks, we was easily able to do this at the same time he started Humera.  He feels the Humara must be helping- on the Enbrel even at  "10 mg he felt poorly controlled stiffness, pain and mobility of his hands.  Today they are doing well.  He is really happy about this and wants to discuss a pred taper.   He was able to see podiatry last week- the toe ulcer is healing.  He has not had recurrent cellulitis of his right leg in months.      He has had no issues with fevers or chills, no chest pain or shortness of breath, no nausea, vomiting, or constipation.  Occasional diarrhea, however is constipated today.  Needed to take oxycodone a couple mornings after he strained his back.      His energy levels are good.  Mood is good. Sleep is good on amitriptyline.     No dysphagia.  Weight is climbing, he has an action plan.         ROS: 10 point ROS neg other than the symptoms noted above in the HPI.    PHYSICAL EXAMINATION  /74 (BP Location: Left arm, Patient Position: Sitting)   Pulse 85   Temp 97.7  F (36.5  C) (Oral)   Resp 24   Ht 1.984 m (6' 6.11\")   Wt (!) 164.6 kg (362 lb 12.8 oz)   SpO2 96%   BMI 41.81 kg/m     Wt Readings from Last 4 Encounters:   10/14/19 (!) 164.6 kg (362 lb 12.8 oz)   09/23/19 (!) 160.1 kg (353 lb)   09/03/19 (!) 158 kg (348 lb 4.8 oz)   08/13/19 (!) 158.2 kg (348 lb 12.8 oz)     Constitutional: Alert, oriented male in no visible distress.  Eyes: PERRL. Anicteric sclerae.  ENT/Mouth: OM moist and pink without lesions or thrush.  CV: RRR  Resp: CTAB throughout  Abdomen: Soft, non-tender, non-distended. Obese. Bowel sounds present. Unable to palpate liver or spleen.  No RLQ tenderness.  Extremities: trace edema , no erythema or warmth over joints  Skin: Warm, dry. Right lower leg show vascular changes with hyperpigmentation. No redness or warmth. Left leg without hyperpigmentation or vascular changes  Lymph: No cervical or supraclavicular lymphadenopathy appreciated.   Neuro: CN II-XII grossly intact.     MUSC: no erythema or warmth over shoulders, DIPs, PIPs.  Hand strength 4/5  Thenar wasting improved R hand. "   Right foot, missing big toe, has a small 2 mm ulcer on his forth toe base (this was not examined today)  ECOG PS: 1        9/3/2019 09:34 9/23/2019 10:45 10/14/2019 08:57   Sodium 140 139 141   Potassium 4.0 4.0 3.9   Chloride 111 (H) 107 108   Carbon Dioxide 25 26 27   Urea Nitrogen 17 23 17   Creatinine 0.92 0.82 0.83   GFR Estimate >90 >90 >90   GFR Estimate If Black >90 >90 >90   Calcium 9.2 9.1 8.6   Anion Gap 5 6 6   Albumin 3.9 3.7 3.5   Protein Total 7.8 7.4 7.1   Bilirubin Total 0.6 0.6 0.6   Alkaline Phosphatase 76 62 64   ALT 31 25 27   AST 20 8 14   Bilirubin Direct  0.1    CRP Inflammation <2.9     TSH 1.80 0.78 3.08   Glucose 111 (H) 121 (H) 107 (H)   WBC 5.1 10.8 6.6   Hemoglobin 14.7 15.1 14.1   Hematocrit 44.6 44.5 42.3   Platelet Count 180 160 128 (L)   RBC Count 4.83 4.93 4.64   MCV 92 90 91         IMPRESSION/PLAN:  1. Metastatic esophogeal adenocarcinoma. He has had treatment with FOLFOX, then Taxol with ramicurimab.  He was then on crizotinib on the [employer] Match study.  He progressed on this and returned on taxol/cyramza.  Levi started Keytruda in October of 2018 and has had regression and stable disease.  Despite taking a break for inflammatory arthritis. Levi had another flare, a grade 2 the week of 9/11 due to tapering off the steroids completely.  He required another boost of prednisone, although we did this quicker (over a two week course) and he started Humara on 10/1, his second dose will be due tomorrow on 10/15.  He is feeling optimistic as his stiffness/pain/mobility is actually much better than while on the previous Enbrel.  We discussed going down to 7.5 mg x 2 weeks and then 5 mg and have a goal of going off if his clinical improvement continues.  OK for cycle 16 today and repeat images in December.     Rheum: see prior notes.  Started Humera 10/1, every 2 weeks.  Pred taper (see above). In the past, dropping below 10 mg has been very hard for him, but we are hopeful the Diego  seems to be helping.     HEME: A history of PE.  He is on Lovenox twice daily-has been compliant. Last Xa was therapeutic.  I checked in August and he was therapeutic (results are scanned in, local lab).   Encouraged diet and exercise control for his weight gain on prednisone. He is going to start back on his total gym and bike daily, was completely committed to this during discussion today      Neuro: He does have baseline neuropathy and is on gabapentin 600/300/600 mg daily.   This is stable      ID: recent cellulitis in May and June in right lower leg, none today except extensive vascular changes.  Saw podiatry locally to help manage a small toe ulcer.     SLEEP: insomnia related to steroids, amitriptyline helping, not needing ativan or Ambien.     Preventative health: multiple vaccinations needed.  Will give Flu shot today. Might make better sense to give pneumonia and shingrix once off of the steroids. Can rediscuss next visit.      Nicole Sutherland PA-C

## 2019-10-14 NOTE — NURSING NOTE
Chief Complaint   Patient presents with     Oncology Clinic Visit     [facility] RETURN- ESOPHAGEAL CA     Port Draw     Port labs collected by RN.

## 2019-10-14 NOTE — PROGRESS NOTES
Infusion Nursing Note:  Reuben Padilla presents today for Cycle 16 Day 1 Keytruda.    Patient seen by provider today: Yes: SERGIO Barker   present during visit today: Not Applicable.    Note: Patient reports that he is feeling well today, he denies any new complaints.    Intravenous Access:  Implanted Port.    Treatment Conditions:  Lab Results   Component Value Date    HGB 14.1 10/14/2019     Lab Results   Component Value Date    WBC 6.6 10/14/2019      Lab Results   Component Value Date    ANEU 4.4 10/14/2019     Lab Results   Component Value Date     10/14/2019      Lab Results   Component Value Date     10/14/2019                   Lab Results   Component Value Date    POTASSIUM 3.9 10/14/2019           Lab Results   Component Value Date    CR 0.83 10/14/2019                   Lab Results   Component Value Date    NIDHI 8.6 10/14/2019                Lab Results   Component Value Date    BILITOTAL 0.6 10/14/2019           Lab Results   Component Value Date    ALBUMIN 3.5 10/14/2019                    Lab Results   Component Value Date    ALT 27 10/14/2019           Lab Results   Component Value Date    AST 14 10/14/2019       Results reviewed, labs MET treatment parameters, ok to proceed with treatment.      Post Infusion Assessment:  Patient tolerated infusion without incident.  Blood return noted pre and post infusion.  Site patent and intact, free from redness, edema or discomfort.  No evidence of extravasations.  Access discontinued per protocol.       Discharge Plan:   Patient declined prescription refills.  Discharge instructions reviewed with: Patient.  Patient and/or family verbalized understanding of discharge instructions and all questions answered.  AVS to patient via [employer].  Patient will return 11/5/2019 for next provider visit and infusion appointment.   Patient discharged in stable condition accompanied by: self.  Departure Mode: Ambulatory.    Mimi Miranda  RN

## 2019-11-01 NOTE — PROGRESS NOTES
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 His tumor is positive for cytokeratin 20, negative for P63 and CK7, and HER2 is negative.     5/15/17--He started on FOLFOX (5FU/oxaliplatin). He had a delay in treatment from 9/5-10/2/17 due to work related injury. Excellent response by imaging throughout the summer 2017 and then mixed response by imaging in late fall 2017.    1/8/18, Taxol and cyramza - had slight progression and neuropathy and clinical trial became available.     3/2018  [facility] Match Clinical Trial with crizotinib.  (MET amplification) progression 7/2018.  7/24/18 switched back to taxol/cramza, eventual progression  10/23/18, he was switched to Keytruda (PD1 overexpressor).  4/2019, his infusion was held for three weeks, and he was treated with prednisone for grade 3 arthralgias.  5/20/19 resumed Keytruda and started Enbrel (remained on low dose prednisone).   10/01/19 started Humara.    INTERVAL HISTORY: Levi comes in today for followup.    He is now on prednisone 2.5 mg as of 3 days ago. His pain hasn't change with the steroid taper, and reportedly is stable and at this time, tolerable. Still in his hands without any other pains. He wishes to continue the steroid taper and see how it goes off of the steroids. He wants to continue Keytruda.    No other concerns, no fevers, infections, breathing changes, chest pain, n/v, bowel changes, changes in RLE hyperpigmentation.        ROS: 10 point ROS neg other than the symptoms noted above in the HPI.    PHYSICAL EXAMINATION  /81 (BP Location: Right arm, Patient Position: Sitting, Cuff Size: Adult Large)   Pulse 102   Temp 98  F (36.7  C) (Oral)   Resp 18   Wt (!) 161.9 kg (357 lb)   SpO2 98%   BMI 41.14 kg/m     Wt Readings from Last 4  Encounters:   11/05/19 (!) 161.9 kg (357 lb)   10/14/19 (!) 164.6 kg (362 lb 12.8 oz)   09/23/19 (!) 160.1 kg (353 lb)   09/03/19 (!) 158 kg (348 lb 4.8 oz)     Constitutional: Alert, oriented male in no visible distress.  Eyes: PERRL. Anicteric sclerae.  ENT/Mouth: OM moist and pink without lesions or thrush.  CV: RRR  Resp: CTAB throughout  Abdomen: Soft, non-tender, non-distended. Obese. Bowel sounds present. Unable to palpate liver or spleen.  No RLQ tenderness.  Extremities: trace edema , no erythema or warmth over joints  Skin: Warm, dry. Right lower leg show vascular changes with hyperpigmentation. No redness or warmth. Left leg without hyperpigmentation or vascular changes  Lymph: No cervical or supraclavicular lymphadenopathy appreciated.   Neuro: CN II-XII grossly intact.     MUSC: no erythema or warmth over shoulders, DIPs, PIPs.  Hand strength 4/5  Thenar wasting improved R hand.   Right foot, missing big toe, has a small 2 mm ulcer on his forth toe base (this was not examined today)  ECOG PS: 1     Results for RITU SIMRAN E (MRN [id number]) as of 11/5/2019 09:10   Ref. Range 11/5/2019 08:29   Sodium Latest Ref Range: 133 - 144 mmol/L 140   Potassium Latest Ref Range: 3.4 - 5.3 mmol/L 4.1   Chloride Latest Ref Range: 94 - 109 mmol/L 108   Carbon Dioxide Latest Ref Range: 20 - 32 mmol/L 26   Urea Nitrogen Latest Ref Range: 7 - 30 mg/dL 22   Creatinine Latest Ref Range: 0.66 - 1.25 mg/dL 0.99   GFR Estimate Latest Ref Range: >60 mL/min/1.73_m2 83   GFR Estimate If Black Latest Ref Range: >60 mL/min/1.73_m2 >90   Calcium Latest Ref Range: 8.5 - 10.1 mg/dL 8.9   Anion Gap Latest Ref Range: 3 - 14 mmol/L 6   Albumin Latest Ref Range: 3.4 - 5.0 g/dL 3.8   Protein Total Latest Ref Range: 6.8 - 8.8 g/dL 7.4   Bilirubin Total Latest Ref Range: 0.2 - 1.3 mg/dL 0.7   Alkaline Phosphatase Latest Ref Range: 40 - 150 U/L 87   ALT Latest Ref Range: 0 - 70 U/L 29   AST Latest Ref Range: 0 - 45 U/L 14   TSH Latest Ref  Range: 0.40 - 4.00 mU/L 3.40   Glucose Latest Ref Range: 70 - 99 mg/dL 97   WBC Latest Ref Range: 4.0 - 11.0 10e9/L 4.7   Hemoglobin Latest Ref Range: 13.3 - 17.7 g/dL 14.8   Hematocrit Latest Ref Range: 40.0 - 53.0 % 44.4   Platelet Count Latest Ref Range: 150 - 450 10e9/L 147 (L)       IMPRESSION/PLAN:  1. Metastatic esophogeal adenocarcinoma. He has had treatment with FOLFOX, then Taxol with ramicurimab.  He was then on crizotinib on the [employer]CI Match study.  He progressed on this and returned on taxol/cyramza.  Levi started Keytruda in October of 2018 and has had regression and stable disease.  Despite taking a break for inflammatory arthritis. Levi had another flare, a grade 2 the week of 9/11 due to tapering off the steroids completely.  He required another boost of prednisone, although we did this quicker (over a two week course) and he started Humara on 10/1, and received second dose 10/15.  He is feeling optimistic as his stiffness/pain/mobility is actually much better than while on the previous Enbrel. Continues steroid taper, now at 2.5 mg with plans to stop in a few days. We will need to watch his symptoms off of steroids. Discussed possibility that we may need a low dose steroid continuously if he continues to flare off of steroids despte the Humira. OK for cycle 17 today and repeat images in December.     Rheum: see prior notes.  Started Humera 10/1, every 2 weeks.  Pred taper (see above). In the past, dropping below 10 mg has been very hard for him, but we are hopeful the Humara seems to be helping.     HEME: A history of PE.  He is on Lovenox twice daily-has been compliant. Last Xa was therapeutic.  We checked in August and he was therapeutic (results are scanned in, local lab).   Encouraged diet and exercise control for his weight gain on prednisone. He is going to start back on his total gym and bike daily, was completely committed to this during discussion today      Neuro: He does have baseline  neuropathy and is on gabapentin 600/300/600 mg daily.   This is stable      ID: recent cellulitis in May and June in right lower leg, none today except extensive vascular changes.  Saw podiatry locally to help manage a small toe ulcer. Discussed symptoms of repeat cellulitis.    SLEEP: insomnia related to steroids, amitriptyline helping, not needing ativan or Ambien.     Preventative health: multiple vaccinations needed.  Already received influenza vaccine. Might make better sense to give pneumonia and shingrix once off of the steroids. Can rediscuss next visit, as he will most likely be off steroids.     Katalina Ramirez PA-C

## 2019-11-05 ENCOUNTER — ONCOLOGY VISIT (OUTPATIENT)
Dept: ONCOLOGY | Facility: CLINIC | Age: 59
End: 2019-11-05
Attending: INTERNAL MEDICINE
Payer: COMMERCIAL

## 2019-11-05 ENCOUNTER — APPOINTMENT (OUTPATIENT)
Dept: LAB | Facility: CLINIC | Age: 59
End: 2019-11-05
Attending: INTERNAL MEDICINE
Payer: COMMERCIAL

## 2019-11-05 VITALS
TEMPERATURE: 98 F | WEIGHT: 315 LBS | RESPIRATION RATE: 18 BRPM | OXYGEN SATURATION: 98 % | HEART RATE: 102 BPM | BODY MASS INDEX: 41.14 KG/M2 | DIASTOLIC BLOOD PRESSURE: 81 MMHG | SYSTOLIC BLOOD PRESSURE: 133 MMHG

## 2019-11-05 DIAGNOSIS — D70.1 CHEMOTHERAPY-INDUCED NEUTROPENIA (H): ICD-10-CM

## 2019-11-05 DIAGNOSIS — T45.1X5A CHEMOTHERAPY-INDUCED NEUTROPENIA (H): ICD-10-CM

## 2019-11-05 DIAGNOSIS — C15.5 CANCER OF DISTAL THIRD OF ESOPHAGUS (H): Primary | ICD-10-CM

## 2019-11-05 LAB
ALBUMIN SERPL-MCNC: 3.8 G/DL (ref 3.4–5)
ALP SERPL-CCNC: 87 U/L (ref 40–150)
ALT SERPL W P-5'-P-CCNC: 29 U/L (ref 0–70)
ANION GAP SERPL CALCULATED.3IONS-SCNC: 6 MMOL/L (ref 3–14)
AST SERPL W P-5'-P-CCNC: 14 U/L (ref 0–45)
BASOPHILS # BLD AUTO: 0 10E9/L (ref 0–0.2)
BASOPHILS NFR BLD AUTO: 0.4 %
BILIRUB SERPL-MCNC: 0.7 MG/DL (ref 0.2–1.3)
BUN SERPL-MCNC: 22 MG/DL (ref 7–30)
CALCIUM SERPL-MCNC: 8.9 MG/DL (ref 8.5–10.1)
CHLORIDE SERPL-SCNC: 108 MMOL/L (ref 94–109)
CO2 SERPL-SCNC: 26 MMOL/L (ref 20–32)
CREAT SERPL-MCNC: 0.99 MG/DL (ref 0.66–1.25)
DIFFERENTIAL METHOD BLD: ABNORMAL
EOSINOPHIL # BLD AUTO: 0 10E9/L (ref 0–0.7)
EOSINOPHIL NFR BLD AUTO: 0.6 %
ERYTHROCYTE [DISTWIDTH] IN BLOOD BY AUTOMATED COUNT: 14.5 % (ref 10–15)
GFR SERPL CREATININE-BSD FRML MDRD: 83 ML/MIN/{1.73_M2}
GLUCOSE SERPL-MCNC: 97 MG/DL (ref 70–99)
HCT VFR BLD AUTO: 44.4 % (ref 40–53)
HGB BLD-MCNC: 14.8 G/DL (ref 13.3–17.7)
IMM GRANULOCYTES # BLD: 0 10E9/L (ref 0–0.4)
IMM GRANULOCYTES NFR BLD: 0.4 %
LYMPHOCYTES # BLD AUTO: 1.8 10E9/L (ref 0.8–5.3)
LYMPHOCYTES NFR BLD AUTO: 38.7 %
MCH RBC QN AUTO: 30.3 PG (ref 26.5–33)
MCHC RBC AUTO-ENTMCNC: 33.3 G/DL (ref 31.5–36.5)
MCV RBC AUTO: 91 FL (ref 78–100)
MONOCYTES # BLD AUTO: 0.4 10E9/L (ref 0–1.3)
MONOCYTES NFR BLD AUTO: 7.5 %
NEUTROPHILS # BLD AUTO: 2.4 10E9/L (ref 1.6–8.3)
NEUTROPHILS NFR BLD AUTO: 52.4 %
NRBC # BLD AUTO: 0 10*3/UL
NRBC BLD AUTO-RTO: 0 /100
PLATELET # BLD AUTO: 147 10E9/L (ref 150–450)
POTASSIUM SERPL-SCNC: 4.1 MMOL/L (ref 3.4–5.3)
PROT SERPL-MCNC: 7.4 G/DL (ref 6.8–8.8)
RBC # BLD AUTO: 4.89 10E12/L (ref 4.4–5.9)
SODIUM SERPL-SCNC: 140 MMOL/L (ref 133–144)
TSH SERPL DL<=0.005 MIU/L-ACNC: 3.4 MU/L (ref 0.4–4)
WBC # BLD AUTO: 4.7 10E9/L (ref 4–11)

## 2019-11-05 PROCEDURE — 25800030 ZZH RX IP 258 OP 636: Mod: ZF | Performed by: PHYSICIAN ASSISTANT

## 2019-11-05 PROCEDURE — 85025 COMPLETE CBC W/AUTO DIFF WBC: CPT | Performed by: PHYSICIAN ASSISTANT

## 2019-11-05 PROCEDURE — 25000128 H RX IP 250 OP 636: Mod: ZF | Performed by: PHYSICIAN ASSISTANT

## 2019-11-05 PROCEDURE — 84443 ASSAY THYROID STIM HORMONE: CPT | Performed by: PHYSICIAN ASSISTANT

## 2019-11-05 PROCEDURE — 99214 OFFICE O/P EST MOD 30 MIN: CPT | Mod: ZP | Performed by: PHYSICIAN ASSISTANT

## 2019-11-05 PROCEDURE — G0463 HOSPITAL OUTPT CLINIC VISIT: HCPCS | Mod: ZF

## 2019-11-05 PROCEDURE — 80053 COMPREHEN METABOLIC PANEL: CPT | Performed by: PHYSICIAN ASSISTANT

## 2019-11-05 PROCEDURE — 96413 CHEMO IV INFUSION 1 HR: CPT

## 2019-11-05 RX ORDER — METHYLPREDNISOLONE SODIUM SUCCINATE 125 MG/2ML
125 INJECTION, POWDER, LYOPHILIZED, FOR SOLUTION INTRAMUSCULAR; INTRAVENOUS
Status: CANCELLED
Start: 2019-11-05

## 2019-11-05 RX ORDER — MEPERIDINE HYDROCHLORIDE 25 MG/ML
25 INJECTION INTRAMUSCULAR; INTRAVENOUS; SUBCUTANEOUS EVERY 30 MIN PRN
Status: CANCELLED | OUTPATIENT
Start: 2019-11-05

## 2019-11-05 RX ORDER — ALBUTEROL SULFATE 90 UG/1
1-2 AEROSOL, METERED RESPIRATORY (INHALATION)
Status: CANCELLED
Start: 2019-11-05

## 2019-11-05 RX ORDER — SODIUM CHLORIDE 9 MG/ML
1000 INJECTION, SOLUTION INTRAVENOUS CONTINUOUS PRN
Status: CANCELLED
Start: 2019-11-05

## 2019-11-05 RX ORDER — ALBUTEROL SULFATE 0.83 MG/ML
2.5 SOLUTION RESPIRATORY (INHALATION)
Status: CANCELLED | OUTPATIENT
Start: 2019-11-05

## 2019-11-05 RX ORDER — HEPARIN SODIUM (PORCINE) LOCK FLUSH IV SOLN 100 UNIT/ML 100 UNIT/ML
5 SOLUTION INTRAVENOUS EVERY 8 HOURS
Status: DISCONTINUED | OUTPATIENT
Start: 2019-11-05 | End: 2019-11-05 | Stop reason: HOSPADM

## 2019-11-05 RX ORDER — EPINEPHRINE 0.3 MG/.3ML
0.3 INJECTION SUBCUTANEOUS EVERY 5 MIN PRN
Status: CANCELLED | OUTPATIENT
Start: 2019-11-05

## 2019-11-05 RX ORDER — DIPHENHYDRAMINE HYDROCHLORIDE 50 MG/ML
50 INJECTION INTRAMUSCULAR; INTRAVENOUS
Status: CANCELLED
Start: 2019-11-05

## 2019-11-05 RX ORDER — HEPARIN SODIUM (PORCINE) LOCK FLUSH IV SOLN 100 UNIT/ML 100 UNIT/ML
500 SOLUTION INTRAVENOUS ONCE
Status: CANCELLED | OUTPATIENT
Start: 2019-11-05

## 2019-11-05 RX ORDER — HEPARIN SODIUM (PORCINE) LOCK FLUSH IV SOLN 100 UNIT/ML 100 UNIT/ML
500 SOLUTION INTRAVENOUS ONCE
Status: COMPLETED | OUTPATIENT
Start: 2019-11-05 | End: 2019-11-05

## 2019-11-05 RX ORDER — LORAZEPAM 2 MG/ML
0.5 INJECTION INTRAMUSCULAR EVERY 4 HOURS PRN
Status: CANCELLED
Start: 2019-11-05

## 2019-11-05 RX ORDER — EPINEPHRINE 1 MG/ML
0.3 INJECTION, SOLUTION INTRAMUSCULAR; SUBCUTANEOUS EVERY 5 MIN PRN
Status: CANCELLED | OUTPATIENT
Start: 2019-11-05

## 2019-11-05 RX ADMIN — HEPARIN 5 ML: 100 SYRINGE at 08:26

## 2019-11-05 RX ADMIN — HEPARIN 500 UNITS: 100 SYRINGE at 11:36

## 2019-11-05 RX ADMIN — SODIUM CHLORIDE 200 MG: 9 INJECTION, SOLUTION INTRAVENOUS at 10:58

## 2019-11-05 RX ADMIN — SODIUM CHLORIDE 250 ML: 9 INJECTION, SOLUTION INTRAVENOUS at 10:56

## 2019-11-05 ASSESSMENT — PAIN SCALES - GENERAL: PAINLEVEL: MODERATE PAIN (4)

## 2019-11-05 NOTE — NURSING NOTE
"Oncology Rooming Note    November 5, 2019 9:10 AM   Reuben Padilla is a 59 year old male who presents for:    Chief Complaint   Patient presents with     Port Draw     Lab sdrawn via PORT by RN in lab. Line flushed with saline and heparin. VS taken.      Oncology Clinic Visit     Return; Esophageal     Initial Vitals: /81 (BP Location: Right arm, Patient Position: Sitting, Cuff Size: Adult Large)   Pulse 102   Temp 98  F (36.7  C) (Oral)   Resp 18   Wt (!) 161.9 kg (357 lb)   SpO2 98%   BMI 41.14 kg/m   Estimated body mass index is 41.14 kg/m  as calculated from the following:    Height as of 10/14/19: 1.984 m (6' 6.11\").    Weight as of this encounter: 161.9 kg (357 lb). Body surface area is 2.99 meters squared.  Moderate Pain (4) Comment: Data Unavailable   No LMP for male patient.  Allergies reviewed: Yes  Medications reviewed: Yes    Medications: Medication refills not needed today.  Pharmacy name entered into [employer]: [id number] - [place], [place] - [address] [place] [address]    Clinical concerns: Patient states there are no new concerns to discuss with provider.  Katalina was not notified.         Piedad Smith CMA              "

## 2019-11-05 NOTE — PATIENT INSTRUCTIONS
Contact Numbers    [facility] Main Line (for Scheduling/Triage/After Hours Nurse Line): [phone]    Please call the [facility] nurse triage or the after hours nurse line if you experience a temperature greater than or equal to 100.5, shaking chills, have uncontrolled nausea, vomiting and/or diarrhea, dizziness, lightheadedness, shortness of breath, chest pain, bleeding, unexplained bruising, or if you have any other new/concerning symptoms, questions or concerns.     If you are having any concerning symptoms or wish to speak to a provider before your next infusion visit, please call your care coordinator or triage to notify them so we can adequately serve you.     If you need a refill on a narcotic prescription or other medication, please call triage before your infusion appointment.       November 2019 Sunday Monday Tuesday Wednesday Thursday Friday Saturday                            1     2       3     4     5    [facility] DRAW   8:30 AM   (15 min.)    [facility]    [facility] RETURN   9:05 AM   (50 min.)   Katalina Ramirez PA-C   [facility] ONC INFUSION 60  10:30 AM   (60 min.)    ONCOLOGY INFUSION   [facility] 6     7     8     9       10     11     12     13     14     15     16       17     18     19     20     21     22     23       24     25     26    [facility] DRAW   8:00 AM   (15 min.)    [facility] DRAW   [facility] Lab Draw    [facility] RETURN   8:25 AM   (50 min.)   Loraine Sutherland PA-C   [facility] ONC INFUSION 60   9:30 AM   (60 min.)    ONCOLOGY INFUSION   [facility] 27     28 29     30                 December 2019 Sunday Monday Tuesday Wednesday Thursday Friday Saturday   1     2     3     4     5     6     7       8     9     10     11     12     13     14       15     16    [facility]RAW  12:00 PM   (15 min.)   [facility]   [facility] Lab Draw    CT CHEST/ABDOMEN/PELVIS W  12:10 PM   (20 min.)   UCCT2   [facility] CT    [facility] RETURN   3:15 PM   (30 min.)   Kadi Dee MD   [facility]    [facility] ONC INFUSION 60   4:00 PM   (60 min.)   [facility] ONCOLOGY INFUSION   [facility] 17     18     19     20     21       22     23     24     25     26     27     28       29     30     31                                         Lab Results:  Recent Results (from the past 12 hour(s))   CBC with platelets differential    Collection Time: 11/05/19  8:29 AM   Result Value Ref Range    WBC 4.7 4.0 - 11.0 10e9/L    RBC Count 4.89 4.4 - 5.9 10e12/L    Hemoglobin 14.8 13.3 - 17.7 g/dL    Hematocrit 44.4 40.0 - 53.0 %    MCV 91 78 - 100 fl    MCH 30.3 26.5 - 33.0 pg    MCHC 33.3 31.5 - 36.5 g/dL    RDW 14.5 10.0 - 15.0 %    Platelet Count 147 (L) 150 - 450 10e9/L    Diff Method Automated Method     % Neutrophils 52.4 %    % Lymphocytes 38.7 %    % Monocytes 7.5 %    % Eosinophils 0.6 %    % Basophils 0.4 %    % Immature Granulocytes 0.4 %    Nucleated RBCs 0 0 /100    Absolute Neutrophil 2.4 1.6 - 8.3 10e9/L    Absolute Lymphocytes 1.8 0.8 - 5.3 10e9/L    Absolute Monocytes 0.4 0.0 - 1.3 10e9/L    Absolute Eosinophils 0.0 0.0 - 0.7 10e9/L    Absolute Basophils 0.0 0.0 - 0.2 10e9/L    Abs Immature Granulocytes 0.0 0 - 0.4 10e9/L    Absolute Nucleated RBC 0.0    Comprehensive metabolic panel    Collection Time: 11/05/19  8:29 AM   Result Value Ref Range    Sodium 140 133 - 144 mmol/L    Potassium 4.1 3.4 - 5.3 mmol/L    Chloride 108 94 - 109 mmol/L    Carbon Dioxide 26 20 - 32 mmol/L    Anion Gap 6 3 - 14 mmol/L    Glucose 97 70 - 99 mg/dL    Urea Nitrogen 22 7 - 30 mg/dL    Creatinine 0.99 0.66 - 1.25 mg/dL    GFR Estimate 83 >60 mL/min/[1.73_m2]    GFR Estimate If Black >90 >60 mL/min/[1.73_m2]    Calcium 8.9 8.5 - 10.1 mg/dL    Bilirubin Total 0.7 0.2 - 1.3 mg/dL    Albumin 3.8 3.4 - 5.0 g/dL     Protein Total 7.4 6.8 - 8.8 g/dL    Alkaline Phosphatase 87 40 - 150 U/L    ALT 29 0 - 70 U/L    AST 14 0 - 45 U/L   TSH with free T4 reflex    Collection Time: 11/05/19  8:29 AM   Result Value Ref Range    TSH 3.40 0.40 - 4.00 mU/L

## 2019-11-05 NOTE — NURSING NOTE
Chief Complaint   Patient presents with     Port Draw     Lab sdrawn via PORT by RN in lab. Line flushed with saline and heparin. VS taken.      Ren Kline RN

## 2019-11-05 NOTE — LETTER
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 His tumor is positive for cytokeratin 20, negative for P63 and CK7, and HER2 is negative.     5/15/17--He started on FOLFOX (5FU/oxaliplatin). He had a delay in treatment from 9/5-10/2/17 due to work related injury. Excellent response by imaging throughout the summer 2017 and then mixed response by imaging in late fall 2017.    1/8/18, Taxol and cyramza - had slight progression and neuropathy and clinical trial became available.     3/2018  [facility] Match Clinical Trial with crizotinib.  (MET amplification) progression 7/2018.  7/24/18 switched back to taxol/cramza, eventual progression  10/23/18, he was switched to Keytruda (PD1 overexpressor).  4/2019, his infusion was held for three weeks, and he was treated with prednisone for grade 3 arthralgias.  5/20/19 resumed Keytruda and started Enbrel (remained on low dose prednisone).   10/01/19 started Humara.    INTERVAL HISTORY: Levi comes in today for followup.    He is now on prednisone 2.5 mg as of 3 days ago. His pain hasn't change with the steroid taper, and reportedly is stable and at this time, tolerable. Still in his hands without any other pains. He wishes to continue the steroid taper and see how it goes off of the steroids. He wants to continue Keytruda.    No other concerns, no fevers, infections, breathing changes, chest pain, n/v, bowel changes, changes in RLE hyperpigmentation.        ROS: 10 point ROS neg other than the symptoms noted above in the HPI.    PHYSICAL EXAMINATION  /81 (BP Location: Right arm, Patient Position: Sitting, Cuff Size: Adult Large)   Pulse 102   Temp 98  F (36.7  C) (Oral)   Resp 18   Wt  (!) 161.9 kg (357 lb)   SpO2 98%   BMI 41.14 kg/m      Wt Readings from Last 4 Encounters:   11/05/19 (!) 161.9 kg (357 lb)   10/14/19 (!) 164.6 kg (362 lb 12.8 oz)   09/23/19 (!) 160.1 kg (353 lb)   09/03/19 (!) 158 kg (348 lb 4.8 oz)     Constitutional: Alert, oriented male in no visible distress.  Eyes: PERRL. Anicteric sclerae.  ENT/Mouth: OM moist and pink without lesions or thrush.  CV: RRR  Resp: CTAB throughout  Abdomen: Soft, non-tender, non-distended. Obese. Bowel sounds present. Unable to palpate liver or spleen.  No RLQ tenderness.  Extremities: trace edema , no erythema or warmth over joints  Skin: Warm, dry. Right lower leg show vascular changes with hyperpigmentation. No redness or warmth. Left leg without hyperpigmentation or vascular changes  Lymph: No cervical or supraclavicular lymphadenopathy appreciated.   Neuro: CN II-XII grossly intact.     MUSC: no erythema or warmth over shoulders, DIPs, PIPs.  Hand strength 4/5  Thenar wasting improved R hand.   Right foot, missing big toe, has a small 2 mm ulcer on his forth toe base (this was not examined today)  ECOG PS: 1     Results for SIMRAN CHANEY (MRN [id number]) as of 11/5/2019 09:10   Ref. Range 11/5/2019 08:29   Sodium Latest Ref Range: 133 - 144 mmol/L 140   Potassium Latest Ref Range: 3.4 - 5.3 mmol/L 4.1   Chloride Latest Ref Range: 94 - 109 mmol/L 108   Carbon Dioxide Latest Ref Range: 20 - 32 mmol/L 26   Urea Nitrogen Latest Ref Range: 7 - 30 mg/dL 22   Creatinine Latest Ref Range: 0.66 - 1.25 mg/dL 0.99   GFR Estimate Latest Ref Range: >60 mL/min/1.73_m2 83   GFR Estimate If Black Latest Ref Range: >60 mL/min/1.73_m2 >90   Calcium Latest Ref Range: 8.5 - 10.1 mg/dL 8.9   Anion Gap Latest Ref Range: 3 - 14 mmol/L 6   Albumin Latest Ref Range: 3.4 - 5.0 g/dL 3.8   Protein Total Latest Ref Range: 6.8 - 8.8 g/dL 7.4   Bilirubin Total Latest Ref Range: 0.2 - 1.3 mg/dL 0.7   Alkaline Phosphatase Latest Ref Range: 40 - 150 U/L 87   ALT Latest  Ref Range: 0 - 70 U/L 29   AST Latest Ref Range: 0 - 45 U/L 14   TSH Latest Ref Range: 0.40 - 4.00 mU/L 3.40   Glucose Latest Ref Range: 70 - 99 mg/dL 97   WBC Latest Ref Range: 4.0 - 11.0 10e9/L 4.7   Hemoglobin Latest Ref Range: 13.3 - 17.7 g/dL 14.8   Hematocrit Latest Ref Range: 40.0 - 53.0 % 44.4   Platelet Count Latest Ref Range: 150 - 450 10e9/L 147 (L)       IMPRESSION/PLAN:  1. Metastatic esophogeal adenocarcinoma. He has had treatment with FOLFOX, then Taxol with ramicurimab.  He was then on crizotinib on the [employer] Match study.  He progressed on this and returned on taxol/cyramza.  Levi started Keytruda in October of 2018 and has had regression and stable disease.  Despite taking a break for inflammatory arthritis. Levi had another flare, a grade 2 the week of 9/11 due to tapering off the steroids completely.  He required another boost of prednisone, although we did this quicker (over a two week course) and he started Humara on 10/1, and received second dose 10/15.  He is feeling optimistic as his stiffness/pain/mobility is actually much better than while on the previous Enbrel. Continues steroid taper, now at 2.5 mg with plans to stop in a few days. We will need to watch his symptoms off of steroids. Discussed possibility that we may need a low dose steroid continuously if he continues to flare off of steroids despte the Humira. OK for cycle 17 today and repeat images in December.     Rheum: see prior notes.  Started Humera 10/1, every 2 weeks.  Pred taper (see above). In the past, dropping below 10 mg has been very hard for him, but we are hopeful the Humara seems to be helping.     HEME: A history of PE.  He is on Lovenox twice daily-has been compliant. Last Xa was therapeutic.  We checked in August and he was therapeutic (results are scanned in, local lab).   Encouraged diet and exercise control for his weight gain on prednisone. He is going to start back on his total gym and bike daily, was completely  committed to this during discussion today      Neuro: He does have baseline neuropathy and is on gabapentin 600/300/600 mg daily.   This is stable      ID: recent cellulitis in May and June in right lower leg, none today except extensive vascular changes.  Saw podiatry locally to help manage a small toe ulcer. Discussed symptoms of repeat cellulitis.    SLEEP: insomnia related to steroids, amitriptyline helping, not needing ativan or Ambien.     Preventative health: multiple vaccinations needed.  Already received influenza vaccine. Might make better sense to give pneumonia and shingrix once off of the steroids. Can rediscuss next visit, as he will most likely be off steroids.     SWAPNIL Atkins PA-C

## 2019-11-05 NOTE — PROGRESS NOTES
Infusion Nursing Note:  Reuben Padilla presents today for Cycle 17 Day 1 Keytruda.    Patient seen by provider today: Yes: SERGIO Atkins   present during visit today: Not Applicable.    Note: Patient states he is tapering off his prednisone which he is unsure how it will ultimately affect him once he is completely off. Pt discussed with BERENICE prior to visit. No further concerns or complaints.    Intravenous Access:  Implanted Port.    Treatment Conditions:  Lab Results   Component Value Date    HGB 14.8 11/05/2019     Lab Results   Component Value Date    WBC 4.7 11/05/2019      Lab Results   Component Value Date    ANEU 2.4 11/05/2019     Lab Results   Component Value Date     11/05/2019      Lab Results   Component Value Date     11/05/2019                   Lab Results   Component Value Date    POTASSIUM 4.1 11/05/2019           Lab Results   Component Value Date    MAG 1.9 11/07/2018            Lab Results   Component Value Date    CR 0.99 11/05/2019                   Lab Results   Component Value Date    NIDHI 8.9 11/05/2019                Lab Results   Component Value Date    BILITOTAL 0.7 11/05/2019           Lab Results   Component Value Date    ALBUMIN 3.8 11/05/2019                    Lab Results   Component Value Date    ALT 29 11/05/2019           Lab Results   Component Value Date    AST 14 11/05/2019       Results reviewed, labs MET treatment parameters, ok to proceed with treatment.      Post Infusion Assessment:  Patient tolerated infusion without incident.  Blood return noted pre and post infusion.  Site patent and intact, free from redness, edema or discomfort.  No evidence of extravasations.  Access discontinued per protocol.       Discharge Plan:   Patient declined prescription refills.  Discharge instructions reviewed with: Patient.  Patient and/or family verbalized understanding of discharge instructions and all questions answered.  AVS to patient via [employer].   Patient will return 11/26 for next appointment.   Patient discharged in stable condition accompanied by: self.  Departure Mode: Ambulatory.    Carley Robles RN

## 2019-11-26 ENCOUNTER — ONCOLOGY VISIT (OUTPATIENT)
Dept: ONCOLOGY | Facility: CLINIC | Age: 59
End: 2019-11-26
Attending: INTERNAL MEDICINE
Payer: COMMERCIAL

## 2019-11-26 ENCOUNTER — APPOINTMENT (OUTPATIENT)
Dept: LAB | Facility: CLINIC | Age: 59
End: 2019-11-26
Attending: INTERNAL MEDICINE
Payer: COMMERCIAL

## 2019-11-26 VITALS
RESPIRATION RATE: 16 BRPM | DIASTOLIC BLOOD PRESSURE: 77 MMHG | OXYGEN SATURATION: 97 % | HEART RATE: 86 BPM | TEMPERATURE: 98.2 F | WEIGHT: 315 LBS | BODY MASS INDEX: 41.72 KG/M2 | SYSTOLIC BLOOD PRESSURE: 115 MMHG

## 2019-11-26 DIAGNOSIS — R19.7 DIARRHEA, UNSPECIFIED TYPE: Primary | ICD-10-CM

## 2019-11-26 DIAGNOSIS — C15.5 CANCER OF DISTAL THIRD OF ESOPHAGUS (H): Primary | ICD-10-CM

## 2019-11-26 DIAGNOSIS — D70.1 CHEMOTHERAPY-INDUCED NEUTROPENIA (H): ICD-10-CM

## 2019-11-26 DIAGNOSIS — T45.1X5A CHEMOTHERAPY-INDUCED NEUTROPENIA (H): ICD-10-CM

## 2019-11-26 DIAGNOSIS — M19.90 INFLAMMATORY ARTHRITIS: ICD-10-CM

## 2019-11-26 DIAGNOSIS — C15.5 CANCER OF DISTAL THIRD OF ESOPHAGUS (H): ICD-10-CM

## 2019-11-26 LAB
ALBUMIN SERPL-MCNC: 3.7 G/DL (ref 3.4–5)
ALP SERPL-CCNC: 75 U/L (ref 40–150)
ALT SERPL W P-5'-P-CCNC: 25 U/L (ref 0–70)
ANION GAP SERPL CALCULATED.3IONS-SCNC: 6 MMOL/L (ref 3–14)
AST SERPL W P-5'-P-CCNC: 13 U/L (ref 0–45)
BASOPHILS # BLD AUTO: 0 10E9/L (ref 0–0.2)
BASOPHILS NFR BLD AUTO: 0.2 %
BILIRUB SERPL-MCNC: 0.6 MG/DL (ref 0.2–1.3)
BUN SERPL-MCNC: 12 MG/DL (ref 7–30)
CALCIUM SERPL-MCNC: 8.9 MG/DL (ref 8.5–10.1)
CHLORIDE SERPL-SCNC: 112 MMOL/L (ref 94–109)
CO2 SERPL-SCNC: 24 MMOL/L (ref 20–32)
CREAT SERPL-MCNC: 1.02 MG/DL (ref 0.66–1.25)
DIFFERENTIAL METHOD BLD: ABNORMAL
EOSINOPHIL # BLD AUTO: 0 10E9/L (ref 0–0.7)
EOSINOPHIL NFR BLD AUTO: 0.6 %
ERYTHROCYTE [DISTWIDTH] IN BLOOD BY AUTOMATED COUNT: 13.8 % (ref 10–15)
GFR SERPL CREATININE-BSD FRML MDRD: 80 ML/MIN/{1.73_M2}
GLUCOSE SERPL-MCNC: 134 MG/DL (ref 70–99)
HCT VFR BLD AUTO: 42.3 % (ref 40–53)
HGB BLD-MCNC: 14 G/DL (ref 13.3–17.7)
IMM GRANULOCYTES # BLD: 0 10E9/L (ref 0–0.4)
IMM GRANULOCYTES NFR BLD: 0.2 %
LYMPHOCYTES # BLD AUTO: 1.7 10E9/L (ref 0.8–5.3)
LYMPHOCYTES NFR BLD AUTO: 33.3 %
MCH RBC QN AUTO: 30.4 PG (ref 26.5–33)
MCHC RBC AUTO-ENTMCNC: 33.1 G/DL (ref 31.5–36.5)
MCV RBC AUTO: 92 FL (ref 78–100)
MONOCYTES # BLD AUTO: 0.3 10E9/L (ref 0–1.3)
MONOCYTES NFR BLD AUTO: 6.3 %
NEUTROPHILS # BLD AUTO: 2.9 10E9/L (ref 1.6–8.3)
NEUTROPHILS NFR BLD AUTO: 59.4 %
NRBC # BLD AUTO: 0 10*3/UL
NRBC BLD AUTO-RTO: 0 /100
PLATELET # BLD AUTO: 139 10E9/L (ref 150–450)
POTASSIUM SERPL-SCNC: 3.7 MMOL/L (ref 3.4–5.3)
PROT SERPL-MCNC: 6.9 G/DL (ref 6.8–8.8)
RBC # BLD AUTO: 4.6 10E12/L (ref 4.4–5.9)
SODIUM SERPL-SCNC: 142 MMOL/L (ref 133–144)
TSH SERPL DL<=0.005 MIU/L-ACNC: 3.08 MU/L (ref 0.4–4)
WBC # BLD AUTO: 5 10E9/L (ref 4–11)

## 2019-11-26 PROCEDURE — 25000128 H RX IP 250 OP 636: Mod: ZF | Performed by: PHYSICIAN ASSISTANT

## 2019-11-26 PROCEDURE — 80053 COMPREHEN METABOLIC PANEL: CPT | Performed by: PHYSICIAN ASSISTANT

## 2019-11-26 PROCEDURE — 85025 COMPLETE CBC W/AUTO DIFF WBC: CPT | Performed by: PHYSICIAN ASSISTANT

## 2019-11-26 PROCEDURE — 84443 ASSAY THYROID STIM HORMONE: CPT | Performed by: PHYSICIAN ASSISTANT

## 2019-11-26 PROCEDURE — G0463 HOSPITAL OUTPT CLINIC VISIT: HCPCS | Mod: ZF

## 2019-11-26 PROCEDURE — 96361 HYDRATE IV INFUSION ADD-ON: CPT

## 2019-11-26 PROCEDURE — 25800030 ZZH RX IP 258 OP 636: Mod: ZF | Performed by: PHYSICIAN ASSISTANT

## 2019-11-26 PROCEDURE — 96413 CHEMO IV INFUSION 1 HR: CPT

## 2019-11-26 PROCEDURE — 99214 OFFICE O/P EST MOD 30 MIN: CPT | Mod: ZP | Performed by: PHYSICIAN ASSISTANT

## 2019-11-26 RX ORDER — EPINEPHRINE 1 MG/ML
0.3 INJECTION, SOLUTION INTRAMUSCULAR; SUBCUTANEOUS EVERY 5 MIN PRN
Status: CANCELLED | OUTPATIENT
Start: 2019-11-26

## 2019-11-26 RX ORDER — SODIUM CHLORIDE 9 MG/ML
1000 INJECTION, SOLUTION INTRAVENOUS CONTINUOUS PRN
Status: CANCELLED
Start: 2019-11-26

## 2019-11-26 RX ORDER — ALBUTEROL SULFATE 90 UG/1
1-2 AEROSOL, METERED RESPIRATORY (INHALATION)
Status: CANCELLED
Start: 2019-11-26

## 2019-11-26 RX ORDER — METHYLPREDNISOLONE SODIUM SUCCINATE 125 MG/2ML
125 INJECTION, POWDER, LYOPHILIZED, FOR SOLUTION INTRAMUSCULAR; INTRAVENOUS
Status: CANCELLED
Start: 2019-11-26

## 2019-11-26 RX ORDER — DIPHENHYDRAMINE HYDROCHLORIDE 50 MG/ML
50 INJECTION INTRAMUSCULAR; INTRAVENOUS
Status: CANCELLED
Start: 2019-11-26

## 2019-11-26 RX ORDER — LORAZEPAM 2 MG/ML
0.5 INJECTION INTRAMUSCULAR EVERY 4 HOURS PRN
Status: CANCELLED
Start: 2019-11-26

## 2019-11-26 RX ORDER — HEPARIN SODIUM (PORCINE) LOCK FLUSH IV SOLN 100 UNIT/ML 100 UNIT/ML
500 SOLUTION INTRAVENOUS ONCE
Status: CANCELLED | OUTPATIENT
Start: 2019-11-26

## 2019-11-26 RX ORDER — MEPERIDINE HYDROCHLORIDE 25 MG/ML
25 INJECTION INTRAMUSCULAR; INTRAVENOUS; SUBCUTANEOUS EVERY 30 MIN PRN
Status: CANCELLED | OUTPATIENT
Start: 2019-11-26

## 2019-11-26 RX ORDER — ALBUTEROL SULFATE 0.83 MG/ML
2.5 SOLUTION RESPIRATORY (INHALATION)
Status: CANCELLED | OUTPATIENT
Start: 2019-11-26

## 2019-11-26 RX ORDER — HEPARIN SODIUM (PORCINE) LOCK FLUSH IV SOLN 100 UNIT/ML 100 UNIT/ML
500 SOLUTION INTRAVENOUS ONCE
Status: COMPLETED | OUTPATIENT
Start: 2019-11-26 | End: 2019-11-26

## 2019-11-26 RX ORDER — PREDNISONE 5 MG/1
2.5 TABLET ORAL DAILY
Qty: 30 TABLET | Refills: 1 | Status: SHIPPED | OUTPATIENT
Start: 2019-11-26 | End: 2020-01-27

## 2019-11-26 RX ORDER — EPINEPHRINE 0.3 MG/.3ML
0.3 INJECTION SUBCUTANEOUS EVERY 5 MIN PRN
Status: CANCELLED | OUTPATIENT
Start: 2019-11-26

## 2019-11-26 RX ORDER — AMITRIPTYLINE HYDROCHLORIDE 10 MG/1
10 TABLET ORAL AT BEDTIME
Qty: 30 TABLET | Refills: 3 | Status: SHIPPED | OUTPATIENT
Start: 2019-11-26 | End: 2020-01-27

## 2019-11-26 RX ORDER — HEPARIN SODIUM (PORCINE) LOCK FLUSH IV SOLN 100 UNIT/ML 100 UNIT/ML
5 SOLUTION INTRAVENOUS ONCE
Status: COMPLETED | OUTPATIENT
Start: 2019-11-26 | End: 2019-11-26

## 2019-11-26 RX ADMIN — SODIUM CHLORIDE 500 ML: 9 INJECTION, SOLUTION INTRAVENOUS at 09:47

## 2019-11-26 RX ADMIN — SODIUM CHLORIDE 200 MG: 9 INJECTION, SOLUTION INTRAVENOUS at 10:12

## 2019-11-26 RX ADMIN — HEPARIN 5 ML: 100 SYRINGE at 08:14

## 2019-11-26 RX ADMIN — HEPARIN 500 UNITS: 100 SYRINGE at 11:19

## 2019-11-26 ASSESSMENT — PAIN SCALES - GENERAL: PAINLEVEL: NO PAIN (0)

## 2019-11-26 NOTE — PROGRESS NOTES
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 His tumor is positive for cytokeratin 20, negative for P63 and CK7, and HER2 is negative.     5/15/17--He started on FOLFOX (5FU/oxaliplatin). He had a delay in treatment from 9/5-10/2/17 due to work related injury. Excellent response by imaging throughout the summer 2017 and then mixed response by imaging in late fall 2017.    1/8/18, Taxol and cyramza - had slight progression and neuropathy and clinical trial became available.     3/2018  [facility] Match Clinical Trial with crizotinib.  (MET amplification) progression 7/2018.  7/24/18 switched back to taxol/cramza, eventual progression  10/23/18, he was switched to Keytruda (PD1 overexpressor).  4/2019, his infusion was held for three weeks, and he was treated with prednisone for grade 3 arthralgias.  5/20/19 resumed Keytruda and started Enbrel (remained on low dose prednisone).   10/01/19 started Humara.    INTERVAL HISTORY: Levi comes in today for followup.    He is now on prednisone 2.5 mg for the last three weeks.  His pain hasn't change with the steroid taper, and reportedly is stable and at this time, tolerable. Still in his hands without any other pains. He has noticed more fatigue, sluggish and slightly weaker. Doing all his ADLs and exercising.    Had right lower back pain last night with a radicular component around the right waist.  Was mildly nauseated and he was concerned it might be leading to diarrhea and took 4 lomotil.  He took 2 oxycodone and the pain went away, he ended up having diarrhea.  Slept terrible last night, but back pain was gone.     No other concerns, no fevers, infections, breathing changes, chest pain, n/v, bowel changes, changes in RLE hyperpigmentation.        ROS: 10 point ROS neg  other than the symptoms noted above in the HPI.    PHYSICAL EXAMINATION  /77 (BP Location: Right arm, Patient Position: Sitting, Cuff Size: Adult Large)   Pulse 86   Temp 98.2  F (36.8  C) (Oral)   Resp 16   Wt (!) 164.2 kg (362 lb)   SpO2 97%   BMI 41.72 kg/m     Wt Readings from Last 4 Encounters:   11/26/19 (!) 164.2 kg (362 lb)   11/05/19 (!) 161.9 kg (357 lb)   10/14/19 (!) 164.6 kg (362 lb 12.8 oz)   09/23/19 (!) 160.1 kg (353 lb)     Constitutional: Alert, oriented male in no visible distress.  Eyes: PERRL. Anicteric sclerae.  ENT/Mouth: OM moist and pink without lesions or thrush.  CV: RRR  Resp: CTAB throughout  Abdomen: Soft, non-tender, non-distended. Obese. Bowel sounds present. Unable to palpate liver or spleen.  No RLQ tenderness.  Extremities: trace edema , no erythema or warmth over joints  Skin: Warm, dry. Right lower leg show vascular changes with hyperpigmentation. No redness or warmth. Left leg without hyperpigmentation or vascular changes  Lymph: No cervical or supraclavicular lymphadenopathy appreciated.   Neuro: CN II-XII grossly intact.     MUSC: no erythema or warmth over shoulders, DIPs, PIPs.  Hand strength 4/5  Thenar wasting improved R hand.   Right foot, missing big toe, has a small 2 mm ulcer on his forth toe base (this was not examined today)  ECOG PS: 1          10/14/2019 08:57 11/5/2019 08:29 11/26/2019 08:23   WBC 6.6 4.7 5.0   Hemoglobin 14.1 14.8 14.0   Hematocrit 42.3 44.4 42.3   Platelet Count 128 (L) 147 (L) 139 (L)   RBC Count 4.64 4.89 4.60   MCV 91 91 92      10/14/2019 08:57 11/5/2019 08:29 11/26/2019 08:23   Sodium 141 140 142   Potassium 3.9 4.1 3.7   Chloride 108 108 112 (H)   Carbon Dioxide 27 26 24   Urea Nitrogen 17 22 12   Creatinine 0.83 0.99 1.02   GFR Estimate >90 83 80   GFR Estimate If Black >90 >90 >90   Calcium 8.6 8.9 8.9   Anion Gap 6 6 6   Albumin 3.5 3.8 3.7   Protein Total 7.1 7.4 6.9   Bilirubin Total 0.6 0.7 0.6   Alkaline Phosphatase 64  87 75   ALT 27 29 25   AST 14 14 13   TSH 3.08 3.40 3.08   Glucose 107 (H) 97 134 (H)     IMPRESSION/PLAN:  1. Metastatic esophogeal adenocarcinoma. He has had treatment with FOLFOX, then Taxol with ramicurimab.  He was then on crizotinib on the [employer] Match study.  He progressed on this and returned on taxol/cyramza.  Levi started Keytruda in October of 2018 and has had regression and stable disease.  Despite taking a break for inflammatory arthritis. Levi had another flare, a grade 2 the week of 9/11 due to tapering off the steroids completely.  He required another boost of prednisone, although we did this quicker (over a two week course) and he started Humara on 10/1, and received second dose 10/15.  He is feeling optimistic as his stiffness/pain/mobility is actually much better than while on the previous Enbrel. Levi will stay at 2.5 mg for now, he has noticed some more fatigue with dropping down on the steroids and still has intermittent worse days, thus we'll stay at 2.5 for now until his restaging next month.     OK for cycle 18 today and repeat images in December.     Rheum: see prior notes.  Started Humera 10/1, every 2 weeks.  Pred taper (see above). In the past, dropping below 10 mg has been very hard for him, but we are hopeful the Humara seems to be helping.     HEME: A history of PE.  He is on Lovenox twice daily-has been compliant. Last Xa was therapeutic.  We checked in August and he was therapeutic (results are scanned in, local lab).   Encouraged diet and exercise control for his weight gain on prednisone. He is going to start back on his total gym and bike daily, was completely committed to this during discussion today      Neuro: He does have baseline neuropathy and is on gabapentin 600/300/600 mg daily.   This is stable      ID: recent cellulitis in May and June in right lower leg, none today except extensive vascular changes.  Saw podiatry locally to help manage a small toe ulcer. Was recently  on another antibiotic with improved ulcer  --now has diarrhea.  Levi has baseline diarrhea and a few days a week uses lomotil.  Today during our visit he needed to have diarrhea 2-3 times.  Thus will collect a Cdiff sample today. Will give extra IVF    SLEEP: insomnia related to steroids, amitriptyline helping    Preventative health: multiple vaccinations needed.  Already received influenza vaccine. Might make better sense to give pneumonia and shingrix once off of the steroids. Can rediscuss next visit, as he will most likely be off steroids.     Nicole Sutherland PA-C

## 2019-11-26 NOTE — PROGRESS NOTES
Infusion Nursing Note:  Reuben Padilla presents today for Cycle 18 Day 1 Keytruda.    Patient seen by provider today: Yes: SERGIO Barker   present during visit today: Not Applicable.    Note:     11/26/19 0940 TORB SERIGO Barker/Anat López RN  -Ok to proceed with treatment today.   -Please give 500mL NS bolus  -Please collect stool sample for CDiff testing.     Patient was unable to provide a stool sample while in infusion today, SERGIO Barker updated.     Intravenous Access:  Implanted Port.    Treatment Conditions:  Lab Results   Component Value Date    HGB 14.0 11/26/2019     Lab Results   Component Value Date    WBC 5.0 11/26/2019      Lab Results   Component Value Date    ANEU 2.9 11/26/2019     Lab Results   Component Value Date     11/26/2019      Lab Results   Component Value Date     11/26/2019                   Lab Results   Component Value Date    POTASSIUM 3.7 11/26/2019           Lab Results   Component Value Date    MAG 1.9 11/07/2018            Lab Results   Component Value Date    CR 1.02 11/26/2019                   Lab Results   Component Value Date    NIDHI 8.9 11/26/2019                Lab Results   Component Value Date    BILITOTAL 0.6 11/26/2019           Lab Results   Component Value Date    ALBUMIN 3.7 11/26/2019                    Lab Results   Component Value Date    ALT 25 11/26/2019           Lab Results   Component Value Date    AST 13 11/26/2019       Results reviewed, labs MET treatment parameters, ok to proceed with treatment.      Post Infusion Assessment:  Patient tolerated infusion without incident.  Blood return noted pre and post infusion.  Site patent and intact, free from redness, edema or discomfort.  No evidence of extravasations.  Access discontinued per protocol.       Discharge Plan:   Patient declined prescription refills.  Discharge instructions reviewed with: Patient and wife.  Patient and/or family verbalized understanding of  discharge instructions and all questions answered.  AVS to patient via [employer].  Patient will return 12/16/19 for next appointment.   Patient discharged in stable condition accompanied by: wife.  Departure Mode: Ambulatory.    Anat López RN

## 2019-11-26 NOTE — PATIENT INSTRUCTIONS
Contact Numbers    [facility] Main Line/Triage and after hours / weekends / holidays: [phone]      Please call the triage or after hours line if you experience a temperature greater than or equal to 100.5, shaking chills, have uncontrolled nausea, vomiting and/or diarrhea, dizziness, shortness of breath, chest pain, bleeding, unexplained bruising, or if you have any other new/concerning symptoms, questions or concerns.      If you are having any concerning symptoms or wish to speak to a provider before your next infusion visit, please call your care coordinator or triage to notify them so we can adequately serve you.     If you need a refill on a narcotic prescription or other medication, please call before your infusion appointment.

## 2019-11-26 NOTE — NURSING NOTE
"Oncology Rooming Note    November 26, 2019 8:43 AM   Reuben Padilla is a 59 year old male who presents for:    Chief Complaint   Patient presents with     Oncology Clinic Visit     Return - Cancer of distal third of esophagus      Port Draw     Labs drawn via port by RN in lab. VS taken. Patient checked in for next appt.     Initial Vitals: /77 (BP Location: Right arm, Patient Position: Sitting, Cuff Size: Adult Large)   Pulse 86   Temp 98.2  F (36.8  C) (Oral)   Resp 16   Wt (!) 164.2 kg (362 lb)   SpO2 97%   BMI 41.72 kg/m   Estimated body mass index is 41.72 kg/m  as calculated from the following:    Height as of 10/14/19: 1.984 m (6' 6.11\").    Weight as of this encounter: 164.2 kg (362 lb). Body surface area is 3.01 meters squared.  No Pain (0) Comment: Data Unavailable   No LMP for male patient.  Allergies reviewed: Yes  Medications reviewed: Yes    Medications: MEDICATION REFILLS NEEDED TODAY. Provider was notified.  Pharmacy name entered into [employer]: [id number] - [place], [place] - [address] [place] [address]    Clinical concerns: Refill on Amitriptyline; prednisone; No new concerns or questions.    Edda Schuster CMA              "

## 2019-11-26 NOTE — NURSING NOTE
Chief Complaint   Patient presents with     Oncology Clinic Visit     Return - Cancer of distal third of esophagus      Port Draw     Labs drawn via port by RN in lab. VS taken. Patient checked in for next appt.     Port accessed with 20 gauge gripper needle by RN, labs collected, line flushed with saline and heparin.  Vitals taken. Pt checked in for appointment.    Luanne Lynn RN

## 2019-12-05 ENCOUNTER — MYC MEDICAL ADVICE (OUTPATIENT)
Dept: ONCOLOGY | Facility: CLINIC | Age: 59
End: 2019-12-05

## 2019-12-05 DIAGNOSIS — M06.00 SERONEGATIVE RHEUMATOID ARTHRITIS (H): Primary | ICD-10-CM

## 2019-12-06 RX ORDER — PREDNISONE 5 MG/1
TABLET ORAL
Qty: 35 TABLET | Refills: 0 | Status: SHIPPED | OUTPATIENT
Start: 2019-12-06 | End: 2020-01-27

## 2019-12-06 NOTE — TELEPHONE ENCOUNTER
"Tried to call pt to discuss mychart, received message \" The wireless customer you are calling is not available please try again later\". Also sent [employer] for pt to call triage.  "

## 2019-12-06 NOTE — TELEPHONE ENCOUNTER
Loraine Sutherland PA-C Hofman, Maizoua, RN   Caller: Unspecified (Today,  9:07 AM)             Lets give him pred 20 mg x 5, 10 mg x 5 and then leave him at 5 mg daily until he is seen next.     Nicole      Spoke with pt and informed him Rx above will be sent to [facility]. He will see Dr. Dee on 12/16 and decide if he will finish taper.

## 2019-12-06 NOTE — TELEPHONE ENCOUNTER
"Pt called back. Sx have been getting for \"for the past week or so\". Pain in hands is rated 4-5/10 when closing hands, 1-2 when opened. Pt also reports weakness in his hands, have example of being difficult to squeeze lotion out of a bottle. No swelling, dicoloration, or temperature difference. Pt also reports increasing fatigue, and increased general pain/inflammation.  Sporadic diarrhea that is controlled imodium.  No fevers/chills, cough, sore throat, congestion, SOB, edema, n/v/c/urinary issues, abdominal pain,or rash.   Pt would like to repeat a \"prednisone burst\" if he can.  Paged Nicole.  "

## 2019-12-09 ENCOUNTER — TELEPHONE (OUTPATIENT)
Dept: RHEUMATOLOGY | Facility: CLINIC | Age: 59
End: 2019-12-09

## 2019-12-09 NOTE — TELEPHONE ENCOUNTER
PA Initiation    Medication: ACTEMRA   Insurance Company: [employer] - Phone [phone] Fax [phone]  Pharmacy Filling the Rx: [facility] - [place]  Filling Pharmacy Phone:    Filling Pharmacy Fax:    Start Date: 12/9/2019    [id number]

## 2019-12-16 ENCOUNTER — INFUSION THERAPY VISIT (OUTPATIENT)
Dept: ONCOLOGY | Facility: CLINIC | Age: 59
End: 2019-12-16
Attending: INTERNAL MEDICINE
Payer: COMMERCIAL

## 2019-12-16 ENCOUNTER — APPOINTMENT (OUTPATIENT)
Dept: LAB | Facility: CLINIC | Age: 59
End: 2019-12-16
Attending: INTERNAL MEDICINE
Payer: COMMERCIAL

## 2019-12-16 ENCOUNTER — ANCILLARY PROCEDURE (OUTPATIENT)
Dept: CT IMAGING | Facility: CLINIC | Age: 59
End: 2019-12-16
Attending: INTERNAL MEDICINE
Payer: COMMERCIAL

## 2019-12-16 VITALS
SYSTOLIC BLOOD PRESSURE: 144 MMHG | RESPIRATION RATE: 18 BRPM | HEART RATE: 67 BPM | HEIGHT: 78 IN | DIASTOLIC BLOOD PRESSURE: 84 MMHG | TEMPERATURE: 97.6 F | OXYGEN SATURATION: 96 % | BODY MASS INDEX: 36.45 KG/M2 | WEIGHT: 315 LBS

## 2019-12-16 DIAGNOSIS — C79.9 METASTASIS FROM GASTRIC CANCER (H): ICD-10-CM

## 2019-12-16 DIAGNOSIS — T45.1X5A CHEMOTHERAPY-INDUCED NEUTROPENIA (H): ICD-10-CM

## 2019-12-16 DIAGNOSIS — C15.5 CANCER OF DISTAL THIRD OF ESOPHAGUS (H): Primary | ICD-10-CM

## 2019-12-16 DIAGNOSIS — C16.9 METASTASIS FROM GASTRIC CANCER (H): ICD-10-CM

## 2019-12-16 DIAGNOSIS — D70.1 CHEMOTHERAPY-INDUCED NEUTROPENIA (H): ICD-10-CM

## 2019-12-16 LAB
ALBUMIN SERPL-MCNC: 3.8 G/DL (ref 3.4–5)
ALP SERPL-CCNC: 73 U/L (ref 40–150)
ALT SERPL W P-5'-P-CCNC: 22 U/L (ref 0–70)
ANION GAP SERPL CALCULATED.3IONS-SCNC: 4 MMOL/L (ref 3–14)
AST SERPL W P-5'-P-CCNC: 12 U/L (ref 0–45)
BASOPHILS # BLD AUTO: 0 10E9/L (ref 0–0.2)
BASOPHILS NFR BLD AUTO: 0.6 %
BILIRUB SERPL-MCNC: 0.8 MG/DL (ref 0.2–1.3)
BUN SERPL-MCNC: 13 MG/DL (ref 7–30)
CALCIUM SERPL-MCNC: 8.8 MG/DL (ref 8.5–10.1)
CHLORIDE SERPL-SCNC: 110 MMOL/L (ref 94–109)
CO2 SERPL-SCNC: 28 MMOL/L (ref 20–32)
CREAT SERPL-MCNC: 0.88 MG/DL (ref 0.66–1.25)
DIFFERENTIAL METHOD BLD: NORMAL
EOSINOPHIL # BLD AUTO: 0 10E9/L (ref 0–0.7)
EOSINOPHIL NFR BLD AUTO: 0.8 %
ERYTHROCYTE [DISTWIDTH] IN BLOOD BY AUTOMATED COUNT: 13.2 % (ref 10–15)
GFR SERPL CREATININE-BSD FRML MDRD: >90 ML/MIN/{1.73_M2}
GLUCOSE SERPL-MCNC: 102 MG/DL (ref 70–99)
HCT VFR BLD AUTO: 43 % (ref 40–53)
HGB BLD-MCNC: 14.4 G/DL (ref 13.3–17.7)
IMM GRANULOCYTES # BLD: 0 10E9/L (ref 0–0.4)
IMM GRANULOCYTES NFR BLD: 0.4 %
LYMPHOCYTES # BLD AUTO: 1.1 10E9/L (ref 0.8–5.3)
LYMPHOCYTES NFR BLD AUTO: 21.9 %
MCH RBC QN AUTO: 30.6 PG (ref 26.5–33)
MCHC RBC AUTO-ENTMCNC: 33.5 G/DL (ref 31.5–36.5)
MCV RBC AUTO: 91 FL (ref 78–100)
MONOCYTES # BLD AUTO: 0.2 10E9/L (ref 0–1.3)
MONOCYTES NFR BLD AUTO: 4.6 %
NEUTROPHILS # BLD AUTO: 3.5 10E9/L (ref 1.6–8.3)
NEUTROPHILS NFR BLD AUTO: 71.7 %
NRBC # BLD AUTO: 0 10*3/UL
NRBC BLD AUTO-RTO: 0 /100
PLATELET # BLD AUTO: 169 10E9/L (ref 150–450)
POTASSIUM SERPL-SCNC: 4 MMOL/L (ref 3.4–5.3)
PROT SERPL-MCNC: 7.2 G/DL (ref 6.8–8.8)
RBC # BLD AUTO: 4.71 10E12/L (ref 4.4–5.9)
SODIUM SERPL-SCNC: 142 MMOL/L (ref 133–144)
TSH SERPL DL<=0.005 MIU/L-ACNC: 1.59 MU/L (ref 0.4–4)
WBC # BLD AUTO: 4.8 10E9/L (ref 4–11)

## 2019-12-16 PROCEDURE — 96413 CHEMO IV INFUSION 1 HR: CPT

## 2019-12-16 PROCEDURE — 80053 COMPREHEN METABOLIC PANEL: CPT | Performed by: INTERNAL MEDICINE

## 2019-12-16 PROCEDURE — 85025 COMPLETE CBC W/AUTO DIFF WBC: CPT | Performed by: INTERNAL MEDICINE

## 2019-12-16 PROCEDURE — 25000128 H RX IP 250 OP 636: Mod: ZF | Performed by: INTERNAL MEDICINE

## 2019-12-16 PROCEDURE — 99215 OFFICE O/P EST HI 40 MIN: CPT | Mod: ZP | Performed by: INTERNAL MEDICINE

## 2019-12-16 PROCEDURE — 84443 ASSAY THYROID STIM HORMONE: CPT | Performed by: INTERNAL MEDICINE

## 2019-12-16 PROCEDURE — 25800030 ZZH RX IP 258 OP 636: Mod: ZF | Performed by: INTERNAL MEDICINE

## 2019-12-16 PROCEDURE — G0463 HOSPITAL OUTPT CLINIC VISIT: HCPCS | Mod: ZF

## 2019-12-16 RX ORDER — HEPARIN SODIUM (PORCINE) LOCK FLUSH IV SOLN 100 UNIT/ML 100 UNIT/ML
500 SOLUTION INTRAVENOUS ONCE
Status: COMPLETED | OUTPATIENT
Start: 2019-12-16 | End: 2019-12-16

## 2019-12-16 RX ORDER — MEPERIDINE HYDROCHLORIDE 25 MG/ML
25 INJECTION INTRAMUSCULAR; INTRAVENOUS; SUBCUTANEOUS EVERY 30 MIN PRN
Status: CANCELLED | OUTPATIENT
Start: 2019-12-17

## 2019-12-16 RX ORDER — HEPARIN SODIUM (PORCINE) LOCK FLUSH IV SOLN 100 UNIT/ML 100 UNIT/ML
5 SOLUTION INTRAVENOUS EVERY 8 HOURS PRN
Status: DISCONTINUED | OUTPATIENT
Start: 2019-12-16 | End: 2019-12-20 | Stop reason: HOSPADM

## 2019-12-16 RX ORDER — METHYLPREDNISOLONE SODIUM SUCCINATE 125 MG/2ML
125 INJECTION, POWDER, LYOPHILIZED, FOR SOLUTION INTRAMUSCULAR; INTRAVENOUS
Status: CANCELLED
Start: 2019-12-17

## 2019-12-16 RX ORDER — EPINEPHRINE 0.3 MG/.3ML
0.3 INJECTION SUBCUTANEOUS EVERY 5 MIN PRN
Status: CANCELLED | OUTPATIENT
Start: 2019-12-17

## 2019-12-16 RX ORDER — HEPARIN SODIUM (PORCINE) LOCK FLUSH IV SOLN 100 UNIT/ML 100 UNIT/ML
500 SOLUTION INTRAVENOUS ONCE
Status: CANCELLED | OUTPATIENT
Start: 2019-12-17

## 2019-12-16 RX ORDER — EPINEPHRINE 1 MG/ML
0.3 INJECTION, SOLUTION INTRAMUSCULAR; SUBCUTANEOUS EVERY 5 MIN PRN
Status: CANCELLED | OUTPATIENT
Start: 2019-12-17

## 2019-12-16 RX ORDER — IOPAMIDOL 755 MG/ML
135 INJECTION, SOLUTION INTRAVASCULAR ONCE
Status: COMPLETED | OUTPATIENT
Start: 2019-12-16 | End: 2019-12-16

## 2019-12-16 RX ORDER — SODIUM CHLORIDE 9 MG/ML
1000 INJECTION, SOLUTION INTRAVENOUS CONTINUOUS PRN
Status: CANCELLED
Start: 2019-12-17

## 2019-12-16 RX ORDER — ALBUTEROL SULFATE 0.83 MG/ML
2.5 SOLUTION RESPIRATORY (INHALATION)
Status: CANCELLED | OUTPATIENT
Start: 2019-12-17

## 2019-12-16 RX ORDER — LORAZEPAM 2 MG/ML
0.5 INJECTION INTRAMUSCULAR EVERY 4 HOURS PRN
Status: CANCELLED
Start: 2019-12-17

## 2019-12-16 RX ORDER — DIPHENHYDRAMINE HYDROCHLORIDE 50 MG/ML
50 INJECTION INTRAMUSCULAR; INTRAVENOUS
Status: CANCELLED
Start: 2019-12-17

## 2019-12-16 RX ORDER — ALBUTEROL SULFATE 90 UG/1
1-2 AEROSOL, METERED RESPIRATORY (INHALATION)
Status: CANCELLED
Start: 2019-12-17

## 2019-12-16 RX ADMIN — IOPAMIDOL 135 ML: 755 INJECTION, SOLUTION INTRAVASCULAR at 12:46

## 2019-12-16 RX ADMIN — SODIUM CHLORIDE 200 MG: 9 INJECTION, SOLUTION INTRAVENOUS at 17:01

## 2019-12-16 RX ADMIN — SODIUM CHLORIDE 250 ML: 9 INJECTION, SOLUTION INTRAVENOUS at 17:00

## 2019-12-16 RX ADMIN — Medication 5 ML: at 12:13

## 2019-12-16 RX ADMIN — Medication 500 UNITS: at 17:51

## 2019-12-16 ASSESSMENT — PAIN SCALES - GENERAL: PAINLEVEL: SEVERE PAIN (6)

## 2019-12-16 ASSESSMENT — MIFFLIN-ST. JEOR: SCORE: 2588.39

## 2019-12-16 NOTE — NURSING NOTE
Chief Complaint   Patient presents with     Port Draw     Labs drawn via port by RN in lab. Line flushed and hep locked. Ready for CT. VS taken, clinic staff informed to re-check BP during clinic visit. /101     Olga Ashley RN

## 2019-12-16 NOTE — NURSING NOTE
"Oncology Rooming Note    December 16, 2019 3:27 PM   Reuben Padilla is a 59 year old male who presents for:    Chief Complaint   Patient presents with     Port Draw     Labs drawn via port by RN in lab. Line flushed and hep locked. Ready for CT. VS taken, clinic staff informed to re-check BP during clinic visit. /101     Oncology Clinic Visit     RETURN VISIT; ESOPHAGEAL CANCER FOLLOW UP      Initial Vitals: BP (!) 144/84   Pulse 67   Temp 97.6  F (36.4  C) (Oral)   Resp 18   Ht 1.984 m (6' 6.11\")   Wt (!) 163.8 kg (361 lb 3.2 oz)   SpO2 96%   BMI 41.62 kg/m   Estimated body mass index is 41.62 kg/m  as calculated from the following:    Height as of this encounter: 1.984 m (6' 6.11\").    Weight as of this encounter: 163.8 kg (361 lb 3.2 oz). Body surface area is 3 meters squared.  Severe Pain (6) Comment: with movement   No LMP for male patient.  Allergies reviewed: Yes  Medications reviewed: Yes    Medications: Medication refills not needed today.  Pharmacy name entered into [employer]: [id number] - [address]    Clinical concerns: No new concerns today  Dr Dee was notified.      Suha Huerta              "

## 2019-12-16 NOTE — LETTER
12/16/2019       RE: Reuben Padilla  [address] [place] [id number]     Dear Colleague,    Thank you for referring your patient, Reuben Padilla, to the [facility]. Please see a copy of my visit note below.    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 His tumor is positive for cytokeratin 20, negative for P63 and CK7, and HER2 is negative. He started on FOLFOX (5FU/oxaliplatin) on 5/15/2017. He had a delay in treatment from 9/5-10/2/17 due to work related injury.    He had an excellent response by imaging throughout the summer 2017.    He a mixed response by imaging in late fall 2017.    In January 2018, he was switched to taxol and cyramza - had slight progression and neuropathy and clinical trial became available.       In March 2018, he was started on the [facility] Match Clinical Trial with crizotinib.  (MET amplification) He remained on crizotinib from March - July 2018.    August 2018, he was switched back to taxol/cramza.  In October, he was switched to Keytruda (PD1 overexpressor).    In April 2019, his infusion was held for three weeks, and he was treated with prednisone for grade 3 arthralgias.  Keytruda was resumed.  He is now seeing rheumatology and is tapering off of prednisone.  He is also on Enbrel.    INTERVAL HISTORY: Levi comes in today for followup. He is now on Keytruda every three weeks.      He is on prednisone taper; he is unable to be maintained off of prednsione.  He is also on enbrel.   He is feeling better and is very happy about this.  Hand/ strength is much improved.     He has had no issues with fevers or chills, no chest pain or shortness of breath, no nausea, vomiting, diarrhea or constipation.         He has no abdminal pain.  No n/v/d/c.      "  His energy levels are good.       No dysphagia.        ROS: 10 point ROS neg other than the symptoms noted above in the HPI.    PHYSICAL EXAMINATION  BP (!) 144/84   Pulse 67   Temp 97.6  F (36.4  C) (Oral)   Resp 18   Ht 1.984 m (6' 6.11\")   Wt (!) 163.8 kg (361 lb 3.2 oz)   SpO2 96%   BMI 41.62 kg/m      Wt Readings from Last 4 Encounters:   12/16/19 (!) 163.8 kg (361 lb 3.2 oz)   11/26/19 (!) 164.2 kg (362 lb)   11/05/19 (!) 161.9 kg (357 lb)   10/14/19 (!) 164.6 kg (362 lb 12.8 oz)     Constitutional: Alert, oriented male in no visible distress.  Eyes: PERRL. Anicteric sclerae.  ENT/Mouth: OM moist and pink without lesions or thrush.  CV: RRR  Resp: CTAB throughout  Abdomen: Soft, non-tender, non-distended. Obese. Bowel sounds present. Unable to palpate liver or spleen.  No RLQ tenderness.  Extremities: trace edema , no erythema or warmth over joints  Skin: Warm, dry. Hyperpigmentation in his lower extremities R > L, no warmth  Lymph: No cervical or supraclavicular lymphadenopathy appreciated.   Neuro: CN II-XII grossly intact.     MUSC: no erythema or warmth over shoulders, DIPs, PIPs.  Hand strength normal  ECOG PS: 1             Lab Results   Component Value Date    WBC 4.8 12/16/2019     Lab Results   Component Value Date    RBC 4.71 12/16/2019     Lab Results   Component Value Date    HGB 14.4 12/16/2019     Lab Results   Component Value Date    HCT 43.0 12/16/2019     No components found for: MCT  Lab Results   Component Value Date    MCV 91 12/16/2019     Lab Results   Component Value Date    MCH 30.6 12/16/2019     Lab Results   Component Value Date    MCHC 33.5 12/16/2019     Lab Results   Component Value Date    RDW 13.2 12/16/2019     Lab Results   Component Value Date     12/16/2019       Recent Labs   Lab Test 12/16/19  1219 11/26/19  0823    142   POTASSIUM 4.0 3.7   CHLORIDE 110* 112*   CO2 28 24   ANIONGAP 4 6   * 134*   BUN 13 12   CR 0.88 1.02   NIDHI 8.8 8.9 "     Liver Function Studies -   Recent Labs   Lab Test 12/16/19  1219   PROTTOTAL 7.2   ALBUMIN 3.8   BILITOTAL 0.8   ALKPHOS 73   AST 12   ALT 22       IMPRESSION/PLAN:  1. Metastatic esophogeal adenocarcinoma. He has had treatment with FOLFOX and then transitioned to Taxol with ramicurimab.  He was then on crizotinib on the [employer] Match study.  He progressed on this and returned on taxol/cyramza. And is now on Keytruda.    I am very pleased with how he is doing on Keytruda.  He did develop grade 3 arthralgias and is now on prednisone and Enbrel.  He is being followed by rheumatology.  We discussed that I'd like to see how he does off of the prednisone.    Reviewed with several colleagues duration of Keytruda.  For now, will continue current plan of care.  His imaging was reviewed personally by me showing overall stable disease to slight progression.  We discussed considering him for an immunotherapy trial using another immunotherapy agent.  However, given his arthralgias on Keytruda, it is unclear to me whether or not he would be eligible for this study.  At this time, we will plan to continue Keytruda.  We will continue to screen him for other studies.      Other treatment options would include irinotecan.      He does have a history of a PE.  He has been on Lovenox twice daily.  He has progressed through Xarelto in the past.  He is switching to Medicare in the future and may need to switch from Lovenox to oral Coumadin or another agent.      He does have baseline neuropathy and is on gabapentin 600/300/600.      He understands all therapies are palliative.        Kadi Dee MD

## 2019-12-16 NOTE — PROGRESS NOTES
Infusion Nursing Note:  Reuben Padilla presents today for C19 D1 Keytruda.    Patient seen by provider today: Yes, Dr. Dee   present during visit today: Not Applicable.    Note: Patient presents to infusion with no further complaints or concerns after visit with MD. CT completed today.    Intravenous Access:  Implanted Port.    Treatment Conditions:  Lab Results   Component Value Date     12/16/2019                   Lab Results   Component Value Date    POTASSIUM 4.0 12/16/2019           Lab Results   Component Value Date    MAG 1.9 11/07/2018            Lab Results   Component Value Date    CR 0.88 12/16/2019                   Lab Results   Component Value Date    NIDHI 8.8 12/16/2019                Lab Results   Component Value Date    BILITOTAL 0.8 12/16/2019           Lab Results   Component Value Date    ALBUMIN 3.8 12/16/2019                    Lab Results   Component Value Date    ALT 22 12/16/2019           Lab Results   Component Value Date    AST 12 12/16/2019       Results reviewed, labs MET treatment parameters, ok to proceed with treatment.      Post Infusion Assessment:  Patient tolerated infusion without incident.  Blood return noted pre and post infusion.  Site patent and intact, free from redness, edema or discomfort.  No evidence of extravasations.  Access discontinued per protocol.       Discharge Plan:   Patient declined prescription refills.  Discharge instructions reviewed with: Patient and wife.  Patient and/or family verbalized understanding of discharge instructions and all questions answered.  AVS to patient via [employer]  Patient will return 1/6/19 for next appointment.   Patient discharged in stable condition accompanied by: wife.  Departure Mode: Ambulatory.    Carley Robles RN

## 2019-12-16 NOTE — PROGRESS NOTES
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 His tumor is positive for cytokeratin 20, negative for P63 and CK7, and HER2 is negative. He started on FOLFOX (5FU/oxaliplatin) on 5/15/2017. He had a delay in treatment from 9/5-10/2/17 due to work related injury.    He had an excellent response by imaging throughout the summer 2017.    He a mixed response by imaging in late fall 2017.    In January 2018, he was switched to taxol and cyramza - had slight progression and neuropathy and clinical trial became available.       In March 2018, he was started on the [facility] Match Clinical Trial with crizotinib.  (MET amplification) He remained on crizotinib from March - July 2018.    August 2018, he was switched back to taxol/cramza.  In October, he was switched to Keytruda (PD1 overexpressor).    In April 2019, his infusion was held for three weeks, and he was treated with prednisone for grade 3 arthralgias.  Keytruda was resumed.  He is now seeing rheumatology and is tapering off of prednisone.  He is also on Enbrel.    INTERVAL HISTORY: Levi comes in today for followup. He is now on Keytruda every three weeks.      He is on prednisone taper; he is unable to be maintained off of prednsione.  He is also on enbrel.   He is feeling better and is very happy about this.  Hand/ strength is much improved.     He has had no issues with fevers or chills, no chest pain or shortness of breath, no nausea, vomiting, diarrhea or constipation.         He has no abdminal pain.  No n/v/d/c.       His energy levels are good.       No dysphagia.        ROS: 10 point ROS neg other than the symptoms noted above in the HPI.    PHYSICAL EXAMINATION  BP (!) 144/84   Pulse 67   Temp 97.6  F (36.4  C) (Oral)   Resp 18   Ht 1.984 m (6'  "6.11\")   Wt (!) 163.8 kg (361 lb 3.2 oz)   SpO2 96%   BMI 41.62 kg/m     Wt Readings from Last 4 Encounters:   12/16/19 (!) 163.8 kg (361 lb 3.2 oz)   11/26/19 (!) 164.2 kg (362 lb)   11/05/19 (!) 161.9 kg (357 lb)   10/14/19 (!) 164.6 kg (362 lb 12.8 oz)     Constitutional: Alert, oriented male in no visible distress.  Eyes: PERRL. Anicteric sclerae.  ENT/Mouth: OM moist and pink without lesions or thrush.  CV: RRR  Resp: CTAB throughout  Abdomen: Soft, non-tender, non-distended. Obese. Bowel sounds present. Unable to palpate liver or spleen.  No RLQ tenderness.  Extremities: trace edema , no erythema or warmth over joints  Skin: Warm, dry. Hyperpigmentation in his lower extremities R > L, no warmth  Lymph: No cervical or supraclavicular lymphadenopathy appreciated.   Neuro: CN II-XII grossly intact.     MUSC: no erythema or warmth over shoulders, DIPs, PIPs.  Hand strength normal  ECOG PS: 1             Lab Results   Component Value Date    WBC 4.8 12/16/2019     Lab Results   Component Value Date    RBC 4.71 12/16/2019     Lab Results   Component Value Date    HGB 14.4 12/16/2019     Lab Results   Component Value Date    HCT 43.0 12/16/2019     No components found for: MCT  Lab Results   Component Value Date    MCV 91 12/16/2019     Lab Results   Component Value Date    MCH 30.6 12/16/2019     Lab Results   Component Value Date    MCHC 33.5 12/16/2019     Lab Results   Component Value Date    RDW 13.2 12/16/2019     Lab Results   Component Value Date     12/16/2019       Recent Labs   Lab Test 12/16/19  1219 11/26/19  0823    142   POTASSIUM 4.0 3.7   CHLORIDE 110* 112*   CO2 28 24   ANIONGAP 4 6   * 134*   BUN 13 12   CR 0.88 1.02   NIDHI 8.8 8.9     Liver Function Studies -   Recent Labs   Lab Test 12/16/19  1219   PROTTOTAL 7.2   ALBUMIN 3.8   BILITOTAL 0.8   ALKPHOS 73   AST 12   ALT 22       IMPRESSION/PLAN:  1. Metastatic esophogeal adenocarcinoma. He has had treatment with FOLFOX and " then transitioned to Taxol with ramicurimab.  He was then on crizotinib on the [employer] Match study.  He progressed on this and returned on taxol/cyramza. And is now on Keytruda.    I am very pleased with how he is doing on Keytruda.  He did develop grade 3 arthralgias and is now on prednisone and Enbrel.  He is being followed by rheumatology.  We discussed that I'd like to see how he does off of the prednisone.    Reviewed with several colleagues duration of Keytruda.  For now, will continue current plan of care.  His imaging was reviewed personally by me showing overall stable disease to slight progression.  We discussed considering him for an immunotherapy trial using another immunotherapy agent.  However, given his arthralgias on Keytruda, it is unclear to me whether or not he would be eligible for this study.  At this time, we will plan to continue Keytruda.  We will continue to screen him for other studies.      Other treatment options would include irinotecan.      He does have a history of a PE.  He has been on Lovenox twice daily.  He has progressed through Xarelto in the past.  He is switching to Medicare in the future and may need to switch from Lovenox to oral Coumadin or another agent.      He does have baseline neuropathy and is on gabapentin 600/300/600.      He understands all therapies are palliative.        Kadi Dee MD

## 2019-12-17 NOTE — PATIENT INSTRUCTIONS
Contact Numbers    [facility] Main Line (for Scheduling/Triage/After Hours Nurse Line): [phone]    Please call the [facility] nurse triage or the after hours nurse line if you experience a temperature greater than or equal to 100.5, shaking chills, have uncontrolled nausea, vomiting and/or diarrhea, dizziness, lightheadedness, shortness of breath, chest pain, bleeding, unexplained bruising, or if you have any other new/concerning symptoms, questions or concerns.     If you are having any concerning symptoms or wish to speak to a provider before your next infusion visit, please call your care coordinator or triage to notify them so we can adequately serve you.     If you need a refill on a narcotic prescription or other medication, please call triage before your infusion appointment.       December 2019 Sunday Monday Tuesday Wednesday Thursday Friday Saturday   1     2     3     4     5     6     7       8     9     10     11     12     13     14       15     16    [facility] DRAW  12:00 PM   (15 min.)    [facility] DRAW   [facility] Lab Draw    CT CHEST/ABDOMEN/PELVIS W  12:10 PM   (20 min.)   UCCT2   [facility] CT    [facility] RETURN   3:15 PM   (30 min.)   Kadi Dee MD   [facility]   4:00 PM   (60 min.)   [facility] ONCOLOGY INFUSION   [facility] 17     18     19     20     21       22     23     24     25     26     27     28       29     30     31 January 2020 Sunday Monday Tuesday Wednesday Thursday Friday Saturday                  1     2     3     4       5     6    [facility] D[facility]   7:45 AM   (15 min.)    [facility] DRAW   [facility] Lab Draw    [facility] RETURN   8:15 AM   (50 min.)   Loraine Sutherland PA-C   [facility] 60   9:30 AM   (60 min.)   [facility] ONCOLOGY INFUSION   [facility] 7     8     9     10     11        12     13     14     15     16     17     18       19     20     21     22     23     24     25       26     27    [facility] LAB DRAW  11:00 AM   (15 min.)   [facility] LAB DRAW   [facility] Lab Draw    [facility] RETURN  11:35 AM   (50 min.)   Loraine Sutherland PA-C   [facility] ONC INFUSION 60   1:00 PM   (60 min.)    ONCOLOGY INFUSION   [facility] 28     29     30     31                          Lab Results:  Recent Results (from the past 12 hour(s))   CBC with platelets differential    Collection Time: 12/16/19 12:19 PM   Result Value Ref Range    WBC 4.8 4.0 - 11.0 10e9/L    RBC Count 4.71 4.4 - 5.9 10e12/L    Hemoglobin 14.4 13.3 - 17.7 g/dL    Hematocrit 43.0 40.0 - 53.0 %    MCV 91 78 - 100 fl    MCH 30.6 26.5 - 33.0 pg    MCHC 33.5 31.5 - 36.5 g/dL    RDW 13.2 10.0 - 15.0 %    Platelet Count 169 150 - 450 10e9/L    Diff Method Automated Method     % Neutrophils 71.7 %    % Lymphocytes 21.9 %    % Monocytes 4.6 %    % Eosinophils 0.8 %    % Basophils 0.6 %    % Immature Granulocytes 0.4 %    Nucleated RBCs 0 0 /100    Absolute Neutrophil 3.5 1.6 - 8.3 10e9/L    Absolute Lymphocytes 1.1 0.8 - 5.3 10e9/L    Absolute Monocytes 0.2 0.0 - 1.3 10e9/L    Absolute Eosinophils 0.0 0.0 - 0.7 10e9/L    Absolute Basophils 0.0 0.0 - 0.2 10e9/L    Abs Immature Granulocytes 0.0 0 - 0.4 10e9/L    Absolute Nucleated RBC 0.0    Comprehensive metabolic panel    Collection Time: 12/16/19 12:19 PM   Result Value Ref Range    Sodium 142 133 - 144 mmol/L    Potassium 4.0 3.4 - 5.3 mmol/L    Chloride 110 (H) 94 - 109 mmol/L    Carbon Dioxide 28 20 - 32 mmol/L    Anion Gap 4 3 - 14 mmol/L    Glucose 102 (H) 70 - 99 mg/dL    Urea Nitrogen 13 7 - 30 mg/dL    Creatinine 0.88 0.66 - 1.25 mg/dL    GFR Estimate >90 >60 mL/min/[1.73_m2]    GFR Estimate If Black >90 >60 mL/min/[1.73_m2]    Calcium 8.8 8.5 - 10.1 mg/dL    Bilirubin Total 0.8 0.2 - 1.3 mg/dL    Albumin 3.8 3.4 - 5.0 g/dL     Protein Total 7.2 6.8 - 8.8 g/dL    Alkaline Phosphatase 73 40 - 150 U/L    ALT 22 0 - 70 U/L    AST 12 0 - 45 U/L   TSH with free T4 reflex    Collection Time: 12/16/19 12:19 PM   Result Value Ref Range    TSH 1.59 0.40 - 4.00 mU/L

## 2019-12-27 ENCOUNTER — TELEPHONE (OUTPATIENT)
Dept: RHEUMATOLOGY | Facility: CLINIC | Age: 59
End: 2019-12-27

## 2019-12-27 DIAGNOSIS — M06.00 SERONEGATIVE RHEUMATOID ARTHRITIS (H): Primary | ICD-10-CM

## 2019-12-30 NOTE — TELEPHONE ENCOUNTER
We could start methotrexate 4 tab po qwk with monitoring labs in 4 weeks. He should understand that this could take up to 6-8 weeks to show benefit and if no toxicity or side effects, but no benefits, the dose could be gradually increased.    Would you please review SEs on details with him and send Rx?    Actemra is still the next best option as it would work faster if effective. Frustrating that we have not heard back from insurance.

## 2019-12-31 NOTE — TELEPHONE ENCOUNTER
DIAGNOSIS: rt foot pain, imaging at [facility], appt per pt   APPOINTMENT DATE: 1/7   NOTES STATUS DETAILS   OFFICE NOTE from referring provider N/A    OFFICE NOTE from other specialist Care Everywhere julian/rohit   DISCHARGE SUMMARY from hospital N/A    DISCHARGE REPORT from the ER N/A    OPERATIVE REPORT Care Everywhere 2/1/17-juilan  PARTIAL FIRST RAY RESECTION RIGHT FIRST METATARSAL   MEDICATION LIST Internal    MRI N/A    CT SCAN N/A    XRAYS (IMAGES & REPORTS) Received julian 2017    Rohit 2016

## 2020-01-02 NOTE — TELEPHONE ENCOUNTER
Called and spoke with pt, we discussed Methotrexate and potential side effects.  Pt is not happy with potential side effects and does not want to be on it.  He called [facility] on 12/31 and talked with them, they stated it did not need a PA and that all that was needed was for the medication to be released to the pharmacy.  He provided them my number, they did not call.     Prescription has been released by Prior authorization pharmacy liaison Deann CHOPRA  Call placed to [facility] to number obtained from patient. They have transferred me to [facility] specialty who got the medication.  They transferred me back to [facility] who ran the test claim and it showed that he needed to try 3 of the preferred drugs and then could maybe try actemra.  Explained that Actemra cannot be tried due to a second level appeal having been done on 8/19 and we cannot initiate another appeal or even try to get approved until 2/19/2020 due to their policy.  I did ask for representative to call the pt and explain this as they told the pt that it would be filled if we sent the prescription.  They stated that they do not do these things.  I have asked to speak with a manager and have been placed on hold, and was hung up on.    Have called back. Spoke with another [facility] Representative, was placed on hold and then hung up again.      Will try back tomorrow.    Sweetie Hernandez RN  Rheumatology Clinic

## 2020-01-02 NOTE — TELEPHONE ENCOUNTER
Called insurance spoke to [employer] - he stated that 2nd level appeal was denied in August, we cannot submit new PA until Feb 19th due to their 6 month policy - routing to provider for further steps

## 2020-01-03 DIAGNOSIS — C15.5 CANCER OF DISTAL THIRD OF ESOPHAGUS (H): ICD-10-CM

## 2020-01-03 DIAGNOSIS — T45.1X5A CHEMOTHERAPY-INDUCED NEUTROPENIA (H): Primary | ICD-10-CM

## 2020-01-03 DIAGNOSIS — D70.1 CHEMOTHERAPY-INDUCED NEUTROPENIA (H): Primary | ICD-10-CM

## 2020-01-03 RX ORDER — LORAZEPAM 2 MG/ML
0.5 INJECTION INTRAMUSCULAR EVERY 4 HOURS PRN
Status: CANCELLED
Start: 2020-01-06

## 2020-01-03 RX ORDER — MEPERIDINE HYDROCHLORIDE 25 MG/ML
25 INJECTION INTRAMUSCULAR; INTRAVENOUS; SUBCUTANEOUS EVERY 30 MIN PRN
Status: CANCELLED | OUTPATIENT
Start: 2020-01-06

## 2020-01-03 RX ORDER — HEPARIN SODIUM (PORCINE) LOCK FLUSH IV SOLN 100 UNIT/ML 100 UNIT/ML
500 SOLUTION INTRAVENOUS ONCE
Status: CANCELLED | OUTPATIENT
Start: 2020-01-06

## 2020-01-03 RX ORDER — METHYLPREDNISOLONE SODIUM SUCCINATE 125 MG/2ML
125 INJECTION, POWDER, LYOPHILIZED, FOR SOLUTION INTRAMUSCULAR; INTRAVENOUS
Status: CANCELLED
Start: 2020-01-06

## 2020-01-03 RX ORDER — ALBUTEROL SULFATE 0.83 MG/ML
2.5 SOLUTION RESPIRATORY (INHALATION)
Status: CANCELLED | OUTPATIENT
Start: 2020-01-06

## 2020-01-03 RX ORDER — ALBUTEROL SULFATE 90 UG/1
1-2 AEROSOL, METERED RESPIRATORY (INHALATION)
Status: CANCELLED
Start: 2020-01-06

## 2020-01-03 RX ORDER — EPINEPHRINE 0.3 MG/.3ML
0.3 INJECTION SUBCUTANEOUS EVERY 5 MIN PRN
Status: CANCELLED | OUTPATIENT
Start: 2020-01-06

## 2020-01-03 RX ORDER — DIPHENHYDRAMINE HYDROCHLORIDE 50 MG/ML
50 INJECTION INTRAMUSCULAR; INTRAVENOUS
Status: CANCELLED
Start: 2020-01-06

## 2020-01-03 RX ORDER — SODIUM CHLORIDE 9 MG/ML
1000 INJECTION, SOLUTION INTRAVENOUS CONTINUOUS PRN
Status: CANCELLED
Start: 2020-01-06

## 2020-01-03 RX ORDER — EPINEPHRINE 1 MG/ML
0.3 INJECTION, SOLUTION INTRAMUSCULAR; SUBCUTANEOUS EVERY 5 MIN PRN
Status: CANCELLED | OUTPATIENT
Start: 2020-01-06

## 2020-01-03 NOTE — TELEPHONE ENCOUNTER
Pt left message, he was told that he does need to try Methotrexate and SSZ for at least 1 month each before he can try actemra.  Pt is agreeable to trying either one at this point.    Sweetie Hernandez RN  Rheumatology Clinic

## 2020-01-03 NOTE — TELEPHONE ENCOUNTER
Called and spoke with pt, insurance is requesting that pt try kevzara. Have provided information to pt about Methotrexate and Sulfasalazine and he is not crazy about either drug due to the side effects, he does not like the idea about the GI side effects on both of them.  He is wondering if he could try the Kevzara.  Will send message to Dr. Singer to see why or why not.    Sweetie Hernandez RN  Rheumatology Clinic

## 2020-01-04 NOTE — PROGRESS NOTES
"Pt is a 59 year old male here today for:     HPI:   Right second toe pain:   Location: Right 3rd toe (1st toe amputated so this is his new \"2nd toe\")   Duration: 5 months   Trauma/ Fall? None   Able to walk? Yes, but painful   Swelling? Yes  Bruising? None  Numbness/ Tingling? Has neuropathy  Weakness? None  Instability? None  Snapping/Clicking? None  Imaging? Xray- couple months ago at [facility] in [place]  Treatment? 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     Past Medical History:   Diagnosis Date     Arrhythmia      Cancer (H)     esophageal     Glaucoma      Hypertension      Paroxysmal A-fib (H)      Tubular adenoma 02/2017      Past Surgical History:   Procedure Laterality Date     AMPUTATION      toe     ARTHROSCOPY KNEE       bunion surgery       CHOLECYSTECTOMY       COLONOSCOPY  02/2017    remove 2 polyps     ESOPHAGOSCOPY, GASTROSCOPY, DUODENOSCOPY (EGD), COMBINED N/A 10/10/2018    Procedure: COMBINED ESOPHAGOSCOPY, GASTROSCOPY, DUODENOSCOPY (EGD);  Esophagogastroduodenoscopy ;  Surgeon: Leonel Joel MD;  Location: [facility] OR     INSERT PORT VASCULAR ACCESS Right 5/9/2017    Procedure: INSERT PORT VASCULAR ACCESS;  Single Lumen Chest Power Port;  Surgeon: Jasiel Hu PA-C;  [facility] OR      Current Outpatient Medications   Medication Sig Dispense Refill     diphenoxylate-atropine (LOMOTIL) 2.5-0.025 MG tablet Take 1-2 tablets by mouth 4 times daily as needed for diarrhea (Max of 8 pills in 24 hours) 100 tablet 1     enoxaparin (LOVENOX) 100 MG/ML syringe Inject 1 mL (100 mg) Subcutaneous every 12 hours 60 Syringe 3     gabapentin (NEURONTIN) 300 MG capsule 2 tablets in the am and 3 tablets before bed 150 capsule 5     latanoprost (XALATAN) 0.005 % ophthalmic solution Place 1 drop into both eyes At Bedtime 1 Bottle 0     lidocaine-prilocaine " "(EMLA) 2.5-2.5 % external cream Apply topically as needed for moderate pain 30 g 3     multivitamin, therapeutic with minerals (MULTI-VITAMIN) TABS tablet Take 1 tablet by mouth daily       oxyCODONE IR (ROXICODONE) 10 MG tablet Take 10 mg by mouth every 4 hours as needed for severe pain 1-2 tablets every 4-6hrs for pain.       predniSONE (DELTASONE) 5 MG tablet Take 0.5 tablets (2.5 mg) by mouth daily 30 tablet 1     Sarilumab 200 MG/1.14ML SOAJ Inject 200 mg Subcutaneous every 14 days Hold for signs of infection and seek medical attention. 2 pen 2     timolol (TIMOPTIC) 0.5 % ophthalmic solution Place into both eyes daily       amitriptyline (ELAVIL) 10 MG tablet Take 1 tablet (10 mg) by mouth At Bedtime (Patient not taking: Reported on 2020) 30 tablet 3     tocilizumab (ACTEMRA) 162 MG/0.9ML subcutaneous injection Inject 0.9 mLs (162 mg) Subcutaneous every 7 days Hold for signs of infection, and seek medical attention. (Patient not taking: Reported on 2020) 4 Syringe 5      Allergies   Allergen Reactions     Penicillin G      Pt not sure-      Social History     Tobacco Use     Smoking status: Former Smoker     Packs/day: 1.00     Years: 20.00     Pack years: 20.00     Types: Cigarettes     Last attempt to quit: 2006     Years since quittin.0     Smokeless tobacco: Never Used   Substance Use Topics     Alcohol use: Yes     Comment: occasional     Drug use: No     Comment: h/o over 30 years ago      No family history on file.   ROS:   Gen- no fevers/chills   Rheum - no morning stiffness   Derm - no rash/ redness   Neuro - no numbness, no tingling   Remainder of ROS negative.     Exam:   Ht 1.984 m (6' 6.11\")   Wt (!) 162.4 kg (358 lb)   BMI 41.25 kg/m         R Foot:   Inspection: Swelling - No; Bruising - No; Erythema - NO   Sensation: unable to sense light touch or temperature  ROM: Full  Strength: deferred  Bony tenderness: None - though sensation is dimished    Xray 20    No evidence of " osteo      (M79.674) Toe pain, right  (primary encounter diagnosis)  Comment: toe pain greatly improved s/p removal of callus by him 2 days ago; no evidence of infection at this time; emphasized continued foot care; precautions given; f/u prn  Plan: XR Toe Right G/E 2 Views            Jaime Yun MD  January 7, 2020  11:09 AM

## 2020-01-06 ENCOUNTER — INFUSION THERAPY VISIT (OUTPATIENT)
Dept: ONCOLOGY | Facility: CLINIC | Age: 60
End: 2020-01-06
Attending: PHYSICIAN ASSISTANT
Payer: COMMERCIAL

## 2020-01-06 ENCOUNTER — APPOINTMENT (OUTPATIENT)
Dept: LAB | Facility: CLINIC | Age: 60
End: 2020-01-06
Attending: PHYSICIAN ASSISTANT
Payer: COMMERCIAL

## 2020-01-06 VITALS
TEMPERATURE: 97.7 F | RESPIRATION RATE: 16 BRPM | BODY MASS INDEX: 41.3 KG/M2 | WEIGHT: 315 LBS | DIASTOLIC BLOOD PRESSURE: 90 MMHG | HEART RATE: 80 BPM | OXYGEN SATURATION: 96 % | SYSTOLIC BLOOD PRESSURE: 136 MMHG

## 2020-01-06 DIAGNOSIS — C15.5 CANCER OF DISTAL THIRD OF ESOPHAGUS (H): ICD-10-CM

## 2020-01-06 DIAGNOSIS — C15.5 CANCER OF DISTAL THIRD OF ESOPHAGUS (H): Primary | ICD-10-CM

## 2020-01-06 LAB
ALBUMIN SERPL-MCNC: 3.8 G/DL (ref 3.4–5)
ALP SERPL-CCNC: 83 U/L (ref 40–150)
ALT SERPL W P-5'-P-CCNC: 22 U/L (ref 0–70)
ANION GAP SERPL CALCULATED.3IONS-SCNC: 3 MMOL/L (ref 3–14)
AST SERPL W P-5'-P-CCNC: 11 U/L (ref 0–45)
BASOPHILS # BLD AUTO: 0 10E9/L (ref 0–0.2)
BASOPHILS NFR BLD AUTO: 0.5 %
BILIRUB SERPL-MCNC: 0.5 MG/DL (ref 0.2–1.3)
BUN SERPL-MCNC: 13 MG/DL (ref 7–30)
CALCIUM SERPL-MCNC: 8.8 MG/DL (ref 8.5–10.1)
CHLORIDE SERPL-SCNC: 111 MMOL/L (ref 94–109)
CO2 SERPL-SCNC: 28 MMOL/L (ref 20–32)
CREAT SERPL-MCNC: 0.88 MG/DL (ref 0.66–1.25)
DIFFERENTIAL METHOD BLD: ABNORMAL
EOSINOPHIL # BLD AUTO: 0 10E9/L (ref 0–0.7)
EOSINOPHIL NFR BLD AUTO: 1 %
ERYTHROCYTE [DISTWIDTH] IN BLOOD BY AUTOMATED COUNT: 13.2 % (ref 10–15)
GFR SERPL CREATININE-BSD FRML MDRD: >90 ML/MIN/{1.73_M2}
GLUCOSE SERPL-MCNC: 103 MG/DL (ref 70–99)
HCT VFR BLD AUTO: 44.1 % (ref 40–53)
HGB BLD-MCNC: 14.6 G/DL (ref 13.3–17.7)
IMM GRANULOCYTES # BLD: 0 10E9/L (ref 0–0.4)
IMM GRANULOCYTES NFR BLD: 0.2 %
LYMPHOCYTES # BLD AUTO: 1.7 10E9/L (ref 0.8–5.3)
LYMPHOCYTES NFR BLD AUTO: 41.5 %
MCH RBC QN AUTO: 29.9 PG (ref 26.5–33)
MCHC RBC AUTO-ENTMCNC: 33.1 G/DL (ref 31.5–36.5)
MCV RBC AUTO: 90 FL (ref 78–100)
MONOCYTES # BLD AUTO: 0.2 10E9/L (ref 0–1.3)
MONOCYTES NFR BLD AUTO: 5.5 %
NEUTROPHILS # BLD AUTO: 2.2 10E9/L (ref 1.6–8.3)
NEUTROPHILS NFR BLD AUTO: 51.3 %
NRBC # BLD AUTO: 0 10*3/UL
NRBC BLD AUTO-RTO: 0 /100
PLATELET # BLD AUTO: 133 10E9/L (ref 150–450)
POTASSIUM SERPL-SCNC: 3.8 MMOL/L (ref 3.4–5.3)
PROT SERPL-MCNC: 7.1 G/DL (ref 6.8–8.8)
RBC # BLD AUTO: 4.89 10E12/L (ref 4.4–5.9)
SODIUM SERPL-SCNC: 143 MMOL/L (ref 133–144)
TSH SERPL DL<=0.005 MIU/L-ACNC: 3.16 MU/L (ref 0.4–4)
WBC # BLD AUTO: 4.2 10E9/L (ref 4–11)

## 2020-01-06 PROCEDURE — 85025 COMPLETE CBC W/AUTO DIFF WBC: CPT | Performed by: PHYSICIAN ASSISTANT

## 2020-01-06 PROCEDURE — 80053 COMPREHEN METABOLIC PANEL: CPT | Performed by: PHYSICIAN ASSISTANT

## 2020-01-06 PROCEDURE — 84443 ASSAY THYROID STIM HORMONE: CPT | Performed by: PHYSICIAN ASSISTANT

## 2020-01-06 PROCEDURE — 25000128 H RX IP 250 OP 636: Mod: ZF | Performed by: PHYSICIAN ASSISTANT

## 2020-01-06 PROCEDURE — 25800030 ZZH RX IP 258 OP 636: Mod: ZF | Performed by: PHYSICIAN ASSISTANT

## 2020-01-06 PROCEDURE — G0463 HOSPITAL OUTPT CLINIC VISIT: HCPCS | Mod: ZF

## 2020-01-06 PROCEDURE — 99214 OFFICE O/P EST MOD 30 MIN: CPT | Mod: ZP | Performed by: PHYSICIAN ASSISTANT

## 2020-01-06 PROCEDURE — 96413 CHEMO IV INFUSION 1 HR: CPT

## 2020-01-06 RX ORDER — HEPARIN SODIUM (PORCINE) LOCK FLUSH IV SOLN 100 UNIT/ML 100 UNIT/ML
500 SOLUTION INTRAVENOUS ONCE
Status: COMPLETED | OUTPATIENT
Start: 2020-01-06 | End: 2020-01-06

## 2020-01-06 RX ORDER — HEPARIN SODIUM (PORCINE) LOCK FLUSH IV SOLN 100 UNIT/ML 100 UNIT/ML
5 SOLUTION INTRAVENOUS DAILY PRN
Status: DISCONTINUED | OUTPATIENT
Start: 2020-01-06 | End: 2020-01-06 | Stop reason: HOSPADM

## 2020-01-06 RX ORDER — LIDOCAINE/PRILOCAINE 2.5 %-2.5%
CREAM (GRAM) TOPICAL PRN
Qty: 30 G | Refills: 3 | Status: SHIPPED | OUTPATIENT
Start: 2020-01-06 | End: 2023-05-22

## 2020-01-06 RX ADMIN — SODIUM CHLORIDE 200 MG: 9 INJECTION, SOLUTION INTRAVENOUS at 10:10

## 2020-01-06 RX ADMIN — SODIUM CHLORIDE 250 ML: 9 INJECTION, SOLUTION INTRAVENOUS at 10:08

## 2020-01-06 RX ADMIN — Medication 5 ML: at 07:52

## 2020-01-06 RX ADMIN — Medication 500 UNITS: at 10:49

## 2020-01-06 ASSESSMENT — PAIN SCALES - GENERAL: PAINLEVEL: NO PAIN (0)

## 2020-01-06 NOTE — TELEPHONE ENCOUNTER
Since kevzara blocks IL-6 like  Actemra, better to try  That over non-biologic drugs. We have to monitor for toxicity with CBC diff, CMP 1 month after start. There is potential increased risk of infection and bowel perforation (rare). Rx was sent.

## 2020-01-06 NOTE — PROGRESS NOTES
Infusion Nursing Note:  Reuben Padilla presents today for Cycle 20 Day 1 Keytruda.    Patient seen by provider today: Yes: SERGIO Barker    Note: No concerns at this time.    Intravenous Access:  Implanted Port.    Treatment Conditions:  Lab Results   Component Value Date     01/06/2020                   Lab Results   Component Value Date    POTASSIUM 3.8 01/06/2020           Lab Results   Component Value Date    MAG 1.9 11/07/2018            Lab Results   Component Value Date    CR 0.88 01/06/2020                   Lab Results   Component Value Date    NIDHI 8.8 01/06/2020                Lab Results   Component Value Date    BILITOTAL 0.5 01/06/2020           Lab Results   Component Value Date    ALBUMIN 3.8 01/06/2020                    Lab Results   Component Value Date    ALT 22 01/06/2020           Lab Results   Component Value Date    AST 11 01/06/2020     TSH   Date Value Ref Range Status   01/06/2020 3.16 0.40 - 4.00 mU/L Final     Results reviewed, labs MET treatment parameters, ok to proceed with treatment.      Post Infusion Assessment:  Patient tolerated infusion without incident.  Blood return noted pre and post infusion.  Access discontinued per protocol.       Discharge Plan:   Patient declined prescription refills.  Discharge instructions reviewed with: Patient and Family.  Patient and family verbalized understanding of discharge instructions and all questions answered.  AVS to patient via "[employer]T.  Patient will return 1/27/20 for next appointment.   Patient discharged in stable condition accompanied by: wife.  Face to Face time: 0.    VIKRAM FIELDS RN

## 2020-01-06 NOTE — PROGRESS NOTES
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 His tumor is positive for cytokeratin 20, negative for P63 and CK7, and HER2 is negative.     5/15/17--He started on FOLFOX (5FU/oxaliplatin). He had a delay in treatment from 9/5-10/2/17 due to work related injury. Excellent response by imaging throughout the summer 2017 and then mixed response by imaging in late fall 2017.    1/8/18, Taxol and cyramza - had slight progression and neuropathy and clinical trial became available.     3/2018  [facility] Match Clinical Trial with crizotinib.  (MET amplification) progression 7/2018.  7/24/18 switched back to taxol/cramza, eventual progression  10/23/18, he was switched to Keytruda (PD1 overexpressor).  4/2019, his infusion was held for three weeks, and he was treated with prednisone for grade 3 arthralgias.  5/20/19 resumed Keytruda and started Enbrel (remained on low dose prednisone).   10/01/19 started Humara.    INTERVAL HISTORY: Levi comes in today for followup.  Last week he had a pain flare and needed to use oxycodone 2 pills once.  Rheum is trying to get Actemra approved, he is now off the Humara (last shot around Dr Dee time 12/16).  He has been on 5 mg of prednisone daily and symptoms are stable.  (was able to be on 2.5 mg for about a month prior to the flare on 12/6, then needed a 20 mg burst with return to 5 mg).      Still occasionally taking lomotil maybe couple days a week.  No further back pain.  Seeing podiatry tomorrow here at [facility].  Would like to trial off his gabapentin.  Sleeping well without aides.     No other concerns, no fevers, infections, breathing changes, chest pain, n/v, bowel changes, changes in RLE hyperpigmentation.        ROS: 10 point ROS neg other than the symptoms noted above in the  HPI.    PHYSICAL EXAMINATION  BP (!) 136/90   Pulse 80   Temp 97.7  F (36.5  C) (Oral)   Resp 16   Wt (!) 162.6 kg (358 lb 6.4 oz)   SpO2 96%   BMI 41.30 kg/m     Wt Readings from Last 4 Encounters:   01/06/20 (!) 162.6 kg (358 lb 6.4 oz)   12/16/19 (!) 163.8 kg (361 lb 3.2 oz)   11/26/19 (!) 164.2 kg (362 lb)   11/05/19 (!) 161.9 kg (357 lb)     Constitutional: Alert, oriented male in no visible distress.  Eyes: PERRL. Anicteric sclerae.  ENT/Mouth: OM moist and pink without lesions or thrush.  CV: RRR  Resp: CTAB throughout  Abdomen: Soft, non-tender, non-distended. Obese. Bowel sounds present. Unable to palpate liver or spleen.  No RLQ tenderness.  Extremities: trace edema , no erythema or warmth over joints  Skin: Warm, dry. Right lower leg show vascular changes with hyperpigmentation. No redness or warmth. Left leg without hyperpigmentation or vascular changes  Lymph: No cervical or supraclavicular lymphadenopathy appreciated.   Neuro: CN II-XII grossly intact.     MUSC: no erythema or warmth over shoulders, DIPs, PIPs.  Hand strength 4/5  Thenar wasting improved R hand.   Right foot, missing big toe, has a small 2 mm ulcer on his forth toe base (this was not examined today)  ECOG PS: 1        11/26/2019 08:23 12/16/2019 12:19 1/6/2020 07:58   WBC 5.0 4.8 4.2   Hemoglobin 14.0 14.4 14.6   Hematocrit 42.3 43.0 44.1   Platelet Count 139 (L) 169 133 (L)   RBC Count 4.60 4.71 4.89   MCV 92 91 90     cmp pending  IMPRESSION/PLAN:  1. Metastatic esophogeal adenocarcinoma. He has had treatment with FOLFOX, then Taxol with ramicurimab.  He was then on crizotinib on the [employer] Match study.  He progressed on this and returned on taxol/cyramza.  Levi started Keytruda in October of 2018 and has had regression and stable disease.  Despite taking a break for inflammatory arthritis. Levi's CT showed mild progression on 12/16/19.  We discussed today, staying on Keytruda and 5 mg prednisone daily, not using a biologic and  doing a short term interval scan in Feb (2 months).  He agreed with this plan and it was reviewed with Dr Dee as well.     OK for cycle 20 today and repeat images in December.     Rheum: see prior notes.  Off Humera now for 3 + weeks. Hands stable ( strength is better in L>R but can make a fist today, minimal pain/stiffness).  Used oxycodone once last week.  We'll try to stay off other medications and just stay on prednisone 5 mg daily.     HEME: A history of PE.  He is on Lovenox twice daily-has been compliant. Last Xa was therapeutic.  We checked in August and he was therapeutic (results are scanned in, local lab).   Encouraged diet and exercise control for his weight gain on prednisone. He is going to start back on his total gym and bike daily, was completely committed to this during discussion today      Neuro: He does have baseline neuropathy and is on gabapentin 600/300/600 mg daily.   This is stable, he would like to trial off of this, wonders if it helps.  Discussed slow taper of AM pills first.       ID: recent cellulitis in May and June in right lower leg, none today except extensive vascular changes.  Saw podiatry locally to help manage a small toe ulcer. Will be seeing podiatry here tomorrow.    SLEEP: insomnia related to steroids, amitriptyline only prn    Preventative health: multiple vaccinations needed.  Already received influenza vaccine. Might make better sense to give pneumonia and shingrix once off of the steroids. Did not discuss this today.     Nicole Sutherland PA-C

## 2020-01-06 NOTE — PATIENT INSTRUCTIONS
[facility] Triage and after hours / weekends / holidays:  [phone]    Please call the triage or after hours line if you experience a temperature greater than or equal to 100.5, shaking chills, have uncontrolled nausea, vomiting and/or diarrhea, dizziness, shortness of breath, chest pain, bleeding, unexplained bruising, or if you have any other new/concerning symptoms, questions or concerns.      If you are having any concerning symptoms or wish to speak to a provider before your next infusion visit, please call your care coordinator or triage to notify them so we can adequately serve you.     If you need a refill on a narcotic prescription or other medication, please call before your infusion appointment.       January 2020 Sunday Monday Tuesday Wednesday Thursday Friday Saturday                  1     2     3     4       5     6    [facility] DRAW   7:45 AM   (15 min.)    [facility] DRAW   [facility] Lab Draw    [facility] RETURN   8:15 AM   (50 min.)   Loraine Sutherland PA-C   [facility] ONC INFUSION 60   9:30 AM   (60 min.)    ONCOLOGY INFUSION   [facility] 7    P NEW  10:15 AM   (30 min.)   Jaime Yun MD   [facility] 8     9     10     11       12     13     14     15     16     17     18       19     20     21     22     23     24     25       26     27    [facility] DRAW  11:00 AM   (15 min.)    [facility] DRAW   [facility] Lab Draw    [facility] RETURN  11:35 AM   (50 min.)   Loraine Sutherland PA-C   [facility] ONC INFUSION 60   1:00 PM   (60 min.)    ONCOLOGY INFUSION   [facility] 28     29     30     31 February 2020 Sunday Monday Tuesday Wednesday Thursday Friday Saturday                                 1       2     3     4     5     6     7     8       9     10     11     12     13     14     15       16     17     18     19     20      21     22       23     24     25     26     27     28     29                    Recent Results (from the past 24 hour(s))   TSH with free T4 reflex    Collection Time: 01/06/20  7:58 AM   Result Value Ref Range    TSH 3.16 0.40 - 4.00 mU/L   Comprehensive metabolic panel    Collection Time: 01/06/20  7:58 AM   Result Value Ref Range    Sodium 143 133 - 144 mmol/L    Potassium 3.8 3.4 - 5.3 mmol/L    Chloride 111 (H) 94 - 109 mmol/L    Carbon Dioxide 28 20 - 32 mmol/L    Anion Gap 3 3 - 14 mmol/L    Glucose 103 (H) 70 - 99 mg/dL    Urea Nitrogen 13 7 - 30 mg/dL    Creatinine 0.88 0.66 - 1.25 mg/dL    GFR Estimate >90 >60 mL/min/[1.73_m2]    GFR Estimate If Black >90 >60 mL/min/[1.73_m2]    Calcium 8.8 8.5 - 10.1 mg/dL    Bilirubin Total 0.5 0.2 - 1.3 mg/dL    Albumin 3.8 3.4 - 5.0 g/dL    Protein Total 7.1 6.8 - 8.8 g/dL    Alkaline Phosphatase 83 40 - 150 U/L    ALT 22 0 - 70 U/L    AST 11 0 - 45 U/L   CBC with platelets differential    Collection Time: 01/06/20  7:58 AM   Result Value Ref Range    WBC 4.2 4.0 - 11.0 10e9/L    RBC Count 4.89 4.4 - 5.9 10e12/L    Hemoglobin 14.6 13.3 - 17.7 g/dL    Hematocrit 44.1 40.0 - 53.0 %    MCV 90 78 - 100 fl    MCH 29.9 26.5 - 33.0 pg    MCHC 33.1 31.5 - 36.5 g/dL    RDW 13.2 10.0 - 15.0 %    Platelet Count 133 (L) 150 - 450 10e9/L    Diff Method Automated Method     % Neutrophils 51.3 %    % Lymphocytes 41.5 %    % Monocytes 5.5 %    % Eosinophils 1.0 %    % Basophils 0.5 %    % Immature Granulocytes 0.2 %    Nucleated RBCs 0 0 /100    Absolute Neutrophil 2.2 1.6 - 8.3 10e9/L    Absolute Lymphocytes 1.7 0.8 - 5.3 10e9/L    Absolute Monocytes 0.2 0.0 - 1.3 10e9/L    Absolute Eosinophils 0.0 0.0 - 0.7 10e9/L    Absolute Basophils 0.0 0.0 - 0.2 10e9/L    Abs Immature Granulocytes 0.0 0 - 0.4 10e9/L    Absolute Nucleated RBC 0.0

## 2020-01-06 NOTE — TELEPHONE ENCOUNTER
Currently pt is on 5 mg of prednisone, Dr. Dee would like him to hold off on starting on anything new, as his cancer has had some growth, as much as 1 cm in one tumor.  He is currently doing ok on 5 mg a day of prednisone.  They are not sure how what the inhibitors are keeping the keytruda working.  Pt would like Dr. Dee and Dr. Singer to discuss, as he would like them to discuss as he is concerned about the growth.     Will send message to Dr. Singer and see what they say and then get back to them.  He is concerned that the TNF inhibitors are causing or contributing to the growth.    Sweetie Hernandez RN  Rheumatology Clinic

## 2020-01-06 NOTE — NURSING NOTE
"Oncology Rooming Note    January 6, 2020 8:20 AM   Reuben Padilla is a 59 year old male who presents for:    Chief Complaint   Patient presents with     Port Draw     labs drawn via port by RN in lab. VS taken.      Oncology Clinic Visit     Return; Esophageal Ca     Initial Vitals: BP (!) 136/90   Pulse 80   Temp 97.7  F (36.5  C) (Oral)   Resp 16   Wt (!) 162.6 kg (358 lb 6.4 oz)   SpO2 96%   BMI 41.30 kg/m   Estimated body mass index is 41.3 kg/m  as calculated from the following:    Height as of 12/16/19: 1.984 m (6' 6.11\").    Weight as of this encounter: 162.6 kg (358 lb 6.4 oz). Body surface area is 2.99 meters squared.  No Pain (0) Comment: Data Unavailable   No LMP for male patient.  Allergies reviewed: Yes  Medications reviewed: Yes    Medications: MEDICATION REFILLS NEEDED TODAY. Provider was notified.  Pharmacy name entered into EPIC:    [id number] - [place], [place] - [address] [facility] [place] [address]    Clinical concerns: Needs refill for Lidocaine cream.        Brigette Guardado CMA              "

## 2020-01-07 ENCOUNTER — PRE VISIT (OUTPATIENT)
Dept: ORTHOPEDICS | Facility: CLINIC | Age: 60
End: 2020-01-07

## 2020-01-07 ENCOUNTER — ANCILLARY PROCEDURE (OUTPATIENT)
Dept: GENERAL RADIOLOGY | Facility: CLINIC | Age: 60
End: 2020-01-07
Attending: FAMILY MEDICINE
Payer: COMMERCIAL

## 2020-01-07 ENCOUNTER — OFFICE VISIT (OUTPATIENT)
Dept: ORTHOPEDICS | Facility: CLINIC | Age: 60
End: 2020-01-07
Payer: COMMERCIAL

## 2020-01-07 VITALS — WEIGHT: 315 LBS | BODY MASS INDEX: 36.45 KG/M2 | HEIGHT: 78 IN

## 2020-01-07 DIAGNOSIS — M79.674 TOE PAIN, RIGHT: ICD-10-CM

## 2020-01-07 DIAGNOSIS — M79.674 TOE PAIN, RIGHT: Primary | ICD-10-CM

## 2020-01-07 ASSESSMENT — PAIN SCALES - GENERAL: PAINLEVEL: NO PAIN (0)

## 2020-01-07 ASSESSMENT — MIFFLIN-ST. JEOR: SCORE: 2573.88

## 2020-01-07 NOTE — LETTER
"   1/7/2020      RE: Reuben Padilla  [address] [address]       Pt is a 59 year old male here today for:     HPI:   Right second toe pain:   Location: Right 3rd toe (1st toe amputated so this is his new \"2nd toe\")   Duration: 5 months   Trauma/ Fall? None   Able to walk? Yes, but painful   Swelling? Yes  Bruising? None  Numbness/ Tingling? Has neuropathy  Weakness? None  Instability? None  Snapping/Clicking? None  Imaging? Xray- couple months ago at [facility] in [place]  Treatment? 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     Past Medical History:   Diagnosis Date     Arrhythmia      Cancer (H)     esophageal     Glaucoma      Hypertension      Paroxysmal A-fib (H)      Tubular adenoma 02/2017      Past Surgical History:   Procedure Laterality Date     AMPUTATION      toe     ARTHROSCOPY KNEE       bunion surgery       CHOLECYSTECTOMY       COLONOSCOPY  02/2017    remove 2 polyps     ESOPHAGOSCOPY, GASTROSCOPY, DUODENOSCOPY (EGD), COMBINED N/A 10/10/2018    Procedure: COMBINED ESOPHAGOSCOPY, GASTROSCOPY, DUODENOSCOPY (EGD);  Esophagogastroduodenoscopy ;  Surgeon: Leonel Joel MD;  Location: [facility] OR     INSERT PORT VASCULAR ACCESS Right 5/9/2017    Procedure: INSERT PORT VASCULAR ACCESS;  Single Lumen Chest Power Port;  Surgeon: Jasiel Hu PA-C;  Location:  OR      Current Outpatient Medications   Medication Sig Dispense Refill     diphenoxylate-atropine (LOMOTIL) 2.5-0.025 MG tablet Take 1-2 tablets by mouth 4 times daily as needed for diarrhea (Max of 8 pills in 24 hours) 100 tablet 1     enoxaparin (LOVENOX) 100 MG/ML syringe Inject 1 mL (100 mg) Subcutaneous every 12 hours 60 Syringe 3     gabapentin (NEURONTIN) 300 MG capsule 2 tablets in the am and 3 tablets before bed 150 capsule 5     latanoprost (XALATAN) 0.005 % ophthalmic " "solution Place 1 drop into both eyes At Bedtime 1 Bottle 0     lidocaine-prilocaine (EMLA) 2.5-2.5 % external cream Apply topically as needed for moderate pain 30 g 3     multivitamin, therapeutic with minerals (MULTI-VITAMIN) TABS tablet Take 1 tablet by mouth daily       oxyCODONE IR (ROXICODONE) 10 MG tablet Take 10 mg by mouth every 4 hours as needed for severe pain 1-2 tablets every 4-6hrs for pain.       predniSONE (DELTASONE) 5 MG tablet Take 0.5 tablets (2.5 mg) by mouth daily 30 tablet 1     Sarilumab 200 MG/1.14ML SOAJ Inject 200 mg Subcutaneous every 14 days Hold for signs of infection and seek medical attention. 2 pen 2     timolol (TIMOPTIC) 0.5 % ophthalmic solution Place into both eyes daily       amitriptyline (ELAVIL) 10 MG tablet Take 1 tablet (10 mg) by mouth At Bedtime (Patient not taking: Reported on 2020) 30 tablet 3     tocilizumab (ACTEMRA) 162 MG/0.9ML subcutaneous injection Inject 0.9 mLs (162 mg) Subcutaneous every 7 days Hold for signs of infection, and seek medical attention. (Patient not taking: Reported on 2020) 4 Syringe 5      Allergies   Allergen Reactions     Penicillin G      Pt not sure-      Social History     Tobacco Use     Smoking status: Former Smoker     Packs/day: 1.00     Years: 20.00     Pack years: 20.00     Types: Cigarettes     Last attempt to quit: 2006     Years since quittin.0     Smokeless tobacco: Never Used   Substance Use Topics     Alcohol use: Yes     Comment: occasional     Drug use: No     Comment: h/o over 30 years ago      No family history on file.   ROS:   Gen- no fevers/chills   Rheum - no morning stiffness   Derm - no rash/ redness   Neuro - no numbness, no tingling   Remainder of ROS negative.     Exam:   Ht 1.984 m (6' 6.11\")   Wt (!) 162.4 kg (358 lb)   BMI 41.25 kg/m          R Foot:   Inspection: Swelling - No; Bruising - No; Erythema - NO   Sensation: unable to sense light touch or temperature  ROM: Full  Strength: " deferred  Bony tenderness: None - though sensation is dimished    Xray 1/7/20    No evidence of osteo      (M79.674) Toe pain, right  (primary encounter diagnosis)  Comment: toe pain greatly improved s/p removal of callus by him 2 days ago; no evidence of infection at this time; emphasized continued foot care; precautions given; f/u prn  Plan: XR Toe Right G/E 2 Views            Jaime Yun MD  January 7, 2020  11:09 AM          Jaime Yun MD

## 2020-01-08 ENCOUNTER — TELEPHONE (OUTPATIENT)
Dept: RHEUMATOLOGY | Facility: CLINIC | Age: 60
End: 2020-01-08

## 2020-01-08 DIAGNOSIS — M19.90 INFLAMMATORY ARTHRITIS: Primary | ICD-10-CM

## 2020-01-08 RX ORDER — OXYCODONE HYDROCHLORIDE 10 MG/1
10 TABLET ORAL EVERY 4 HOURS PRN
Qty: 60 TABLET | Refills: 0 | Status: SHIPPED | OUTPATIENT
Start: 2020-01-08 | End: 2020-02-20

## 2020-01-08 NOTE — TELEPHONE ENCOUNTER
PA Initiation    Medication: KEVZARA  Insurance Company: [employer] - Phone [phone] Fax [phone]  Pharmacy Filling the Rx: [facility] [address]  Filling Pharmacy Phone:    Filling Pharmacy Fax:    Start Date: 1/8/2020

## 2020-01-09 ENCOUNTER — TELEPHONE (OUTPATIENT)
Dept: ONCOLOGY | Facility: CLINIC | Age: 60
End: 2020-01-09

## 2020-01-09 NOTE — TELEPHONE ENCOUNTER
Pt called to ask care team if he was ok to donate plasma. He called his local blood bank and was directed to check with oncology first. Routed to care team for follow-up.

## 2020-01-10 NOTE — TELEPHONE ENCOUNTER
Prior Authorization Approval    Authorization Effective Date: 1/9/2020  Authorization Expiration Date: 7/9/2021  Medication: KEVZARA- Approved  Approved Dose/Quantity: 200mg  Reference #: [id number]   Insurance Company: [employer] - [employer] [phone] Fax [phone]  Expected CoPay: Unknown     CoPay Card Available: Yes    Foundation Assistance Needed: N/A  Which Pharmacy is filling the prescription (Not needed for infusion/clinic administered): [facility] [address]  Pharmacy Notified:  RX released  Patient Notified:  Left VM to inform and let know about copay assist if needed

## 2020-01-10 NOTE — TELEPHONE ENCOUNTER
Plan will be to hold until next scan with Dr. Dee.  Pt aware of plan.      Sweetie Hernandez RN  Rheumatology Clinic

## 2020-01-13 ENCOUNTER — TELEPHONE (OUTPATIENT)
Dept: ONCOLOGY | Facility: CLINIC | Age: 60
End: 2020-01-13

## 2020-01-13 ENCOUNTER — PATIENT OUTREACH (OUTPATIENT)
Dept: ONCOLOGY | Facility: CLINIC | Age: 60
End: 2020-01-13

## 2020-01-13 NOTE — TELEPHONE ENCOUNTER
I called and spoke with Levi.    His arthritis is stable on prednisone 5 mg.  He is tolerating it reasonably well.  Once in a while I.e. once per week he will need an oxycodone for the arthritis pain.      We reviewed having him stay on prednisone 5 mg.  He will hold off on other rheum medications at this time.  We will reimage him in February.    He is not eligible for other immunotherapy trials if he remains on prednisone.    We discussed that some pseudoprogression may be related to be immune blocking medications.    Kadi Dee MD

## 2020-01-20 NOTE — PROGRESS NOTES
"Spokw to Levi who has questins on using a \"Cooper unit\". The unit has a contraindication for use with ongoing chemotherapy and anticoagulation.   Recommend he not purchase or use this machine.   He verbalized understanding and intent.  "

## 2020-01-27 ENCOUNTER — INFUSION THERAPY VISIT (OUTPATIENT)
Dept: ONCOLOGY | Facility: CLINIC | Age: 60
End: 2020-01-27
Attending: PHYSICIAN ASSISTANT
Payer: COMMERCIAL

## 2020-01-27 ENCOUNTER — APPOINTMENT (OUTPATIENT)
Dept: LAB | Facility: CLINIC | Age: 60
End: 2020-01-27
Attending: PHYSICIAN ASSISTANT
Payer: COMMERCIAL

## 2020-01-27 VITALS
WEIGHT: 315 LBS | TEMPERATURE: 98.3 F | RESPIRATION RATE: 16 BRPM | OXYGEN SATURATION: 96 % | SYSTOLIC BLOOD PRESSURE: 128 MMHG | BODY MASS INDEX: 41.25 KG/M2 | HEART RATE: 80 BPM | DIASTOLIC BLOOD PRESSURE: 84 MMHG

## 2020-01-27 DIAGNOSIS — C16.9 METASTASIS FROM GASTRIC CANCER (H): ICD-10-CM

## 2020-01-27 DIAGNOSIS — C79.9 METASTASIS FROM GASTRIC CANCER (H): ICD-10-CM

## 2020-01-27 DIAGNOSIS — C15.5 CANCER OF DISTAL THIRD OF ESOPHAGUS (H): Primary | ICD-10-CM

## 2020-01-27 DIAGNOSIS — T45.1X5A CHEMOTHERAPY-INDUCED NEUTROPENIA (H): ICD-10-CM

## 2020-01-27 DIAGNOSIS — M19.90 INFLAMMATORY ARTHRITIS: ICD-10-CM

## 2020-01-27 DIAGNOSIS — T45.1X5A CHEMOTHERAPY-INDUCED NEUROPATHY (H): ICD-10-CM

## 2020-01-27 DIAGNOSIS — I26.99 ACUTE PULMONARY EMBOLISM (H): ICD-10-CM

## 2020-01-27 DIAGNOSIS — D70.1 CHEMOTHERAPY-INDUCED NEUTROPENIA (H): ICD-10-CM

## 2020-01-27 DIAGNOSIS — G62.0 CHEMOTHERAPY-INDUCED NEUROPATHY (H): ICD-10-CM

## 2020-01-27 DIAGNOSIS — C15.5 CANCER OF DISTAL THIRD OF ESOPHAGUS (H): ICD-10-CM

## 2020-01-27 LAB
ALBUMIN SERPL-MCNC: 3.6 G/DL (ref 3.4–5)
ALP SERPL-CCNC: 82 U/L (ref 40–150)
ALT SERPL W P-5'-P-CCNC: 25 U/L (ref 0–70)
ANION GAP SERPL CALCULATED.3IONS-SCNC: 6 MMOL/L (ref 3–14)
AST SERPL W P-5'-P-CCNC: 15 U/L (ref 0–45)
BASOPHILS # BLD AUTO: 0 10E9/L (ref 0–0.2)
BASOPHILS NFR BLD AUTO: 0.5 %
BILIRUB SERPL-MCNC: 0.7 MG/DL (ref 0.2–1.3)
BUN SERPL-MCNC: 16 MG/DL (ref 7–30)
CALCIUM SERPL-MCNC: 8.8 MG/DL (ref 8.5–10.1)
CHLORIDE SERPL-SCNC: 112 MMOL/L (ref 94–109)
CO2 SERPL-SCNC: 28 MMOL/L (ref 20–32)
CREAT SERPL-MCNC: 0.85 MG/DL (ref 0.66–1.25)
DIFFERENTIAL METHOD BLD: ABNORMAL
EOSINOPHIL # BLD AUTO: 0 10E9/L (ref 0–0.7)
EOSINOPHIL NFR BLD AUTO: 1 %
ERYTHROCYTE [DISTWIDTH] IN BLOOD BY AUTOMATED COUNT: 13 % (ref 10–15)
GFR SERPL CREATININE-BSD FRML MDRD: >90 ML/MIN/{1.73_M2}
GLUCOSE SERPL-MCNC: 96 MG/DL (ref 70–99)
HCT VFR BLD AUTO: 45.6 % (ref 40–53)
HGB BLD-MCNC: 15.1 G/DL (ref 13.3–17.7)
IMM GRANULOCYTES # BLD: 0 10E9/L (ref 0–0.4)
IMM GRANULOCYTES NFR BLD: 0.2 %
LYMPHOCYTES # BLD AUTO: 1.6 10E9/L (ref 0.8–5.3)
LYMPHOCYTES NFR BLD AUTO: 39 %
MCH RBC QN AUTO: 29.7 PG (ref 26.5–33)
MCHC RBC AUTO-ENTMCNC: 33.1 G/DL (ref 31.5–36.5)
MCV RBC AUTO: 90 FL (ref 78–100)
MONOCYTES # BLD AUTO: 0.2 10E9/L (ref 0–1.3)
MONOCYTES NFR BLD AUTO: 5.9 %
NEUTROPHILS # BLD AUTO: 2.2 10E9/L (ref 1.6–8.3)
NEUTROPHILS NFR BLD AUTO: 53.4 %
NRBC # BLD AUTO: 0 10*3/UL
NRBC BLD AUTO-RTO: 0 /100
PLATELET # BLD AUTO: 131 10E9/L (ref 150–450)
POTASSIUM SERPL-SCNC: 3.8 MMOL/L (ref 3.4–5.3)
PROT SERPL-MCNC: 7 G/DL (ref 6.8–8.8)
RBC # BLD AUTO: 5.08 10E12/L (ref 4.4–5.9)
SODIUM SERPL-SCNC: 145 MMOL/L (ref 133–144)
TSH SERPL DL<=0.005 MIU/L-ACNC: 2.31 MU/L (ref 0.4–4)
WBC # BLD AUTO: 4.1 10E9/L (ref 4–11)

## 2020-01-27 PROCEDURE — 25000128 H RX IP 250 OP 636: Mod: ZF | Performed by: PHYSICIAN ASSISTANT

## 2020-01-27 PROCEDURE — 99215 OFFICE O/P EST HI 40 MIN: CPT | Mod: ZP | Performed by: PHYSICIAN ASSISTANT

## 2020-01-27 PROCEDURE — 84443 ASSAY THYROID STIM HORMONE: CPT | Performed by: PHYSICIAN ASSISTANT

## 2020-01-27 PROCEDURE — 25800030 ZZH RX IP 258 OP 636: Mod: ZF | Performed by: PHYSICIAN ASSISTANT

## 2020-01-27 PROCEDURE — 80053 COMPREHEN METABOLIC PANEL: CPT | Performed by: PHYSICIAN ASSISTANT

## 2020-01-27 PROCEDURE — G0463 HOSPITAL OUTPT CLINIC VISIT: HCPCS | Mod: ZF

## 2020-01-27 PROCEDURE — 96413 CHEMO IV INFUSION 1 HR: CPT

## 2020-01-27 PROCEDURE — 85025 COMPLETE CBC W/AUTO DIFF WBC: CPT | Performed by: PHYSICIAN ASSISTANT

## 2020-01-27 RX ORDER — EPINEPHRINE 0.3 MG/.3ML
0.3 INJECTION SUBCUTANEOUS EVERY 5 MIN PRN
Status: CANCELLED | OUTPATIENT
Start: 2020-01-27

## 2020-01-27 RX ORDER — DIPHENHYDRAMINE HYDROCHLORIDE 50 MG/ML
50 INJECTION INTRAMUSCULAR; INTRAVENOUS
Status: CANCELLED
Start: 2020-01-27

## 2020-01-27 RX ORDER — METHYLPREDNISOLONE SODIUM SUCCINATE 125 MG/2ML
125 INJECTION, POWDER, LYOPHILIZED, FOR SOLUTION INTRAMUSCULAR; INTRAVENOUS
Status: CANCELLED
Start: 2020-01-27

## 2020-01-27 RX ORDER — ZOLPIDEM TARTRATE 10 MG/1
10 TABLET ORAL
Qty: 90 TABLET | Refills: 1 | Status: SHIPPED | OUTPATIENT
Start: 2020-01-27 | End: 2020-03-27

## 2020-01-27 RX ORDER — HEPARIN SODIUM (PORCINE) LOCK FLUSH IV SOLN 100 UNIT/ML 100 UNIT/ML
5 SOLUTION INTRAVENOUS EVERY 8 HOURS
Status: DISCONTINUED | OUTPATIENT
Start: 2020-01-27 | End: 2020-01-27 | Stop reason: HOSPADM

## 2020-01-27 RX ORDER — ALBUTEROL SULFATE 0.83 MG/ML
2.5 SOLUTION RESPIRATORY (INHALATION)
Status: CANCELLED | OUTPATIENT
Start: 2020-01-27

## 2020-01-27 RX ORDER — GABAPENTIN 300 MG/1
CAPSULE ORAL
Qty: 450 CAPSULE | Refills: 1 | Status: SHIPPED | OUTPATIENT
Start: 2020-01-27 | End: 2020-03-27

## 2020-01-27 RX ORDER — HEPARIN SODIUM (PORCINE) LOCK FLUSH IV SOLN 100 UNIT/ML 100 UNIT/ML
500 SOLUTION INTRAVENOUS ONCE
Status: COMPLETED | OUTPATIENT
Start: 2020-01-27 | End: 2020-01-27

## 2020-01-27 RX ORDER — EPINEPHRINE 1 MG/ML
0.3 INJECTION, SOLUTION INTRAMUSCULAR; SUBCUTANEOUS EVERY 5 MIN PRN
Status: CANCELLED | OUTPATIENT
Start: 2020-01-27

## 2020-01-27 RX ORDER — HEPARIN SODIUM (PORCINE) LOCK FLUSH IV SOLN 100 UNIT/ML 100 UNIT/ML
500 SOLUTION INTRAVENOUS ONCE
Status: CANCELLED | OUTPATIENT
Start: 2020-01-27

## 2020-01-27 RX ORDER — PREDNISONE 5 MG/1
5 TABLET ORAL DAILY
Qty: 90 TABLET | Refills: 3 | Status: SHIPPED | OUTPATIENT
Start: 2020-01-27 | End: 2020-03-27

## 2020-01-27 RX ORDER — ALBUTEROL SULFATE 90 UG/1
1-2 AEROSOL, METERED RESPIRATORY (INHALATION)
Status: CANCELLED
Start: 2020-01-27

## 2020-01-27 RX ORDER — MEPERIDINE HYDROCHLORIDE 25 MG/ML
25 INJECTION INTRAMUSCULAR; INTRAVENOUS; SUBCUTANEOUS EVERY 30 MIN PRN
Status: CANCELLED | OUTPATIENT
Start: 2020-01-27

## 2020-01-27 RX ORDER — LORAZEPAM 2 MG/ML
0.5 INJECTION INTRAMUSCULAR EVERY 4 HOURS PRN
Status: CANCELLED
Start: 2020-01-27

## 2020-01-27 RX ORDER — SODIUM CHLORIDE 9 MG/ML
1000 INJECTION, SOLUTION INTRAVENOUS CONTINUOUS PRN
Status: CANCELLED
Start: 2020-01-27

## 2020-01-27 RX ADMIN — SODIUM CHLORIDE 250 ML: 9 INJECTION, SOLUTION INTRAVENOUS at 13:25

## 2020-01-27 RX ADMIN — SODIUM CHLORIDE 200 MG: 9 INJECTION, SOLUTION INTRAVENOUS at 13:26

## 2020-01-27 RX ADMIN — Medication 5 ML: at 11:07

## 2020-01-27 RX ADMIN — Medication 500 UNITS: at 14:08

## 2020-01-27 ASSESSMENT — PAIN SCALES - GENERAL: PAINLEVEL: MODERATE PAIN (4)

## 2020-01-27 NOTE — NURSING NOTE
Chief Complaint   Patient presents with     Port Draw     Labs drawn via PORT by RN in lab. Line flusehd with saline and heparin. VS taken.      Ren Kline RN,

## 2020-01-27 NOTE — PROGRESS NOTES
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 His tumor is positive for cytokeratin 20, negative for P63 and CK7, and HER2 is negative.     5/15/17--He started on FOLFOX (5FU/oxaliplatin). He had a delay in treatment from 9/5-10/2/17 due to work related injury. Excellent response by imaging throughout the summer 2017 and then mixed response by imaging in late fall 2017.    1/8/18, Taxol and cyramza - had slight progression and neuropathy and clinical trial became available.     3/2018  [facility] Match Clinical Trial with crizotinib.  (MET amplification) progression 7/2018.  7/24/18 switched back to taxol/cramza, eventual progression  10/23/18, he was switched to Keytruda (PD1 overexpressor).  4/2019, his infusion was held for three weeks, and he was treated with prednisone for grade 3 arthralgias.  5/20/19 resumed Keytruda and started Enbrel (remained on low dose prednisone).   10/01/19 started Humara and last dose was 1/1/2020 12/16/19 CT CAP showed mild progression, plan was to take a break from Rheum medications, stay on low dose prednisone and do a short term follow up    INTERVAL HISTORY: Levi comes in today for followup.  This last month he has been off of the rheumatology medications and only on the prednisone 5 mg daily. Mostly things have been stable, but his right hand does feel weaker, has difficulty making a complete fist of the right hand. However he does not feel that the other rheum medications ever helped.      Eating well, no difficulty swallowing or epigastric pain.   No further back pain.  Saw podiatry here at [facility].  Did try to trial off his gabapentin, but his neuropathy seemed louder so returned to 2 AM and 3 PM.   Sleeping well without aides except intermittent Ambien.      No other concerns,  no fevers, infections, breathing changes, chest pain, n/v, bowel changes, changes in RLE hyperpigmentation.        ROS: 10 point ROS neg other than the symptoms noted above in the HPI.    PHYSICAL EXAMINATION  /84   Pulse 80   Temp 98.3  F (36.8  C) (Oral)   Resp 16   Wt (!) 162.4 kg (358 lb)   SpO2 96%   BMI 41.25 kg/m     Wt Readings from Last 4 Encounters:   01/27/20 (!) 162.4 kg (358 lb)   01/07/20 (!) 162.4 kg (358 lb)   01/06/20 (!) 162.6 kg (358 lb 6.4 oz)   12/16/19 (!) 163.8 kg (361 lb 3.2 oz)     Constitutional: Alert, oriented male in no visible distress.  Eyes: PERRL. Anicteric sclerae.  ENT/Mouth: OM moist and pink without lesions or thrush.  CV: RRR  Resp: CTAB throughout  Abdomen: Soft, non-tender, non-distended. Obese. Bowel sounds present. Unable to palpate liver or spleen.  No RLQ tenderness.  Extremities: trace edema , no erythema or warmth over joints  Skin: Warm, dry. Right lower leg show vascular changes with hyperpigmentation. No redness or warmth. Left leg without hyperpigmentation or vascular changes  Lymph: No cervical or supraclavicular lymphadenopathy appreciated.   Neuro: CN II-XII grossly intact.     MUSC: no erythema or warmth over shoulders, DIPs, PIPs.  Hand strength 3/5 in the right hand, left hand 4/5  Thenar wasting improved R hand.   Right foot, missing big toe, has a small 2 mm ulcer on his forth toe base (this was not examined today)  ECOG PS: 1       12/16/2019 12:19 1/6/2020 07:58 1/27/2020 11:12   WBC 4.8 4.2 4.1   Hemoglobin 14.4 14.6 15.1   Hematocrit 43.0 44.1 45.6   Platelet Count 169 133 (L) 131 (L)   RBC Count 4.71 4.89 5.08   MCV 91 90 90      12/16/2019 12:19 1/6/2020 07:58 1/27/2020 11:12   Sodium 142 143 145 (H)   Potassium 4.0 3.8 3.8   Chloride 110 (H) 111 (H) 112 (H)   Carbon Dioxide 28 28 28   Urea Nitrogen 13 13 16   Creatinine 0.88 0.88 0.85   GFR Estimate >90 >90 >90   GFR Estimate If Black >90 >90 >90   Calcium 8.8 8.8 8.8   Anion Gap 4 3 6    Albumin 3.8 3.8 3.6   Protein Total 7.2 7.1 7.0   Bilirubin Total 0.8 0.5 0.7   Alkaline Phosphatase 73 83 82   ALT 22 22 25   AST 12 11 15   TSH 1.59 3.16 2.31   Glucose 102 (H) 103 (H) 96       IMPRESSION/PLAN:  1. Metastatic esophogeal adenocarcinoma. He has had treatment with FOLFOX, then Taxol with ramicurimab.  He was then on crizotinib on the [employer] Match study.  He progressed on this and returned on taxol/cyramza.  Levi started Keytruda in October of 2018 and has had regression and stable disease, despite taking a break for inflammatory arthritis. Levi's CT showed mild progression on 12/16/19.  We discussed today, staying on Keytruda and 5 mg prednisone daily, not using a biologic and doing a short term interval scan in Feb (2 months).     OK for cycle 21 today and repeat images in February.     Rheum: see prior notes.  Off Humera now for about a month and seems stable. Hands stable ( strength is better in L>R.  I think he is marginally weaker than last visit in the hand, with slight incomplete fist)  Used oxycodone once last week.  Goal is to stay off other Rheum medications and just stay on prednisone 5 mg daily.     HEME: A history of PE.  He is on Lovenox twice daily-has been compliant. Last Xa was therapeutic.  We checked in August and he was therapeutic (results are scanned in, local lab).   Encouraged diet and exercise control for his weight gain on prednisone. Encouraged exercise.      Neuro: He does have baseline neuropathy and is on gabapentin 600/300/600 mg daily.   Neuropathy stable, trial off gabapentin was not successful, thus back on it again.       ID: no recent leg/toe infections. Seeing podiatry here now    SLEEP: insomnia related to steroids, ambien only prn    Preventative health: multiple vaccinations needed.  Already received influenza vaccine. Might make better sense to give pneumonia and shingrix once off of the steroids. Did not discuss this today, but will revisit depending on  next plan of care for him    Nicole Sutherland PA-C

## 2020-01-27 NOTE — NURSING NOTE
"Oncology Rooming Note    January 27, 2020 11:51 AM   Reuben Padilla is a 59 year old male who presents for:    Chief Complaint   Patient presents with     Port Draw     Labs drawn via PORT by RN in lab. Line flusehd with saline and heparin. VS taken.      Oncology Clinic Visit     RETURN; ESOPHAGEAL CA     Initial Vitals: /84   Pulse 80   Temp 98.3  F (36.8  C) (Oral)   Resp 16   Wt (!) 162.4 kg (358 lb)   SpO2 96%   BMI 41.25 kg/m   Estimated body mass index is 41.25 kg/m  as calculated from the following:    Height as of 1/7/20: 1.984 m (6' 6.11\").    Weight as of this encounter: 162.4 kg (358 lb). Body surface area is 2.99 meters squared.  Moderate Pain (4) Comment: Data Unavailable   No LMP for male patient.  Allergies reviewed: Yes  Medications reviewed: Yes    Medications: MEDICATION REFILLS NEEDED TODAY. Provider was notified.  Pharmacy name entered into EPIC:    [id number] - [place], [place] - [address] [facility] [address]    Clinical concerns: Refill on  Prednisone; Pt would like to know if there is a study .  Edda Schuster CMA              "

## 2020-01-27 NOTE — PATIENT INSTRUCTIONS
Contact Numbers    [facility] Main Line/Triage and after hours / weekends / holidays: [phone]      Please call the triage or after hours line if you experience a temperature greater than or equal to 100.5, shaking chills, have uncontrolled nausea, vomiting and/or diarrhea, dizziness, shortness of breath, chest pain, bleeding, unexplained bruising, or if you have any other new/concerning symptoms, questions or concerns.      If you are having any concerning symptoms or wish to speak to a provider before your next infusion visit, please call your care coordinator or triage to notify them so we can adequately serve you.     If you need a refill on a narcotic prescription or other medication, please call before your infusion appointment.

## 2020-01-27 NOTE — PROGRESS NOTES
Infusion Nursing Note:  Reuben Padilla presents today for Cycle 31 Day 1 Keytruda.    Patient seen by provider today: Yes: SERGIO Barker   present during visit today: Not Applicable.    Note: N/A.    Intravenous Access:  Implanted Port.    Treatment Conditions:  Lab Results   Component Value Date    HGB 15.1 01/27/2020     Lab Results   Component Value Date    WBC 4.1 01/27/2020      Lab Results   Component Value Date    ANEU 2.2 01/27/2020     Lab Results   Component Value Date     01/27/2020      Lab Results   Component Value Date     01/27/2020                   Lab Results   Component Value Date    POTASSIUM 3.8 01/27/2020           Lab Results   Component Value Date    MAG 1.9 11/07/2018            Lab Results   Component Value Date    CR 0.85 01/27/2020                   Lab Results   Component Value Date    NIDHI 8.8 01/27/2020                Lab Results   Component Value Date    BILITOTAL 0.7 01/27/2020           Lab Results   Component Value Date    ALBUMIN 3.6 01/27/2020                    Lab Results   Component Value Date    ALT 25 01/27/2020           Lab Results   Component Value Date    AST 15 01/27/2020       Results reviewed, labs MET treatment parameters, ok to proceed with treatment.      Post Infusion Assessment:  Patient tolerated infusion without incident.  Blood return noted pre and post infusion.  Site patent and intact, free from redness, edema or discomfort.  No evidence of extravasations.  Access discontinued per protocol.       Discharge Plan:   Patient declined prescription refills.  Discharge instructions reviewed with: Patient.  Patient and/or family verbalized understanding of discharge instructions and all questions answered.  AVS to patient via [employer].  Patient will return 2/20/20 for next appointment.   Patient discharged in stable condition accompanied by: sister.  Departure Mode: Ambulatory.    Anat López RN

## 2020-02-20 ENCOUNTER — ONCOLOGY VISIT (OUTPATIENT)
Dept: ONCOLOGY | Facility: CLINIC | Age: 60
End: 2020-02-20
Attending: INTERNAL MEDICINE
Payer: COMMERCIAL

## 2020-02-20 ENCOUNTER — ANCILLARY PROCEDURE (OUTPATIENT)
Dept: CT IMAGING | Facility: CLINIC | Age: 60
End: 2020-02-20
Attending: PHYSICIAN ASSISTANT
Payer: COMMERCIAL

## 2020-02-20 VITALS
BODY MASS INDEX: 36.45 KG/M2 | DIASTOLIC BLOOD PRESSURE: 98 MMHG | WEIGHT: 315 LBS | HEIGHT: 78 IN | OXYGEN SATURATION: 94 % | HEART RATE: 102 BPM | SYSTOLIC BLOOD PRESSURE: 135 MMHG | RESPIRATION RATE: 18 BRPM | TEMPERATURE: 98 F

## 2020-02-20 DIAGNOSIS — M19.90 INFLAMMATORY ARTHRITIS: ICD-10-CM

## 2020-02-20 DIAGNOSIS — C15.5 CANCER OF DISTAL THIRD OF ESOPHAGUS (H): Primary | ICD-10-CM

## 2020-02-20 DIAGNOSIS — R19.7 DIARRHEA, UNSPECIFIED TYPE: ICD-10-CM

## 2020-02-20 DIAGNOSIS — D70.1 CHEMOTHERAPY-INDUCED NEUTROPENIA (H): ICD-10-CM

## 2020-02-20 DIAGNOSIS — T45.1X5A CHEMOTHERAPY-INDUCED NEUTROPENIA (H): ICD-10-CM

## 2020-02-20 DIAGNOSIS — C15.5 CANCER OF DISTAL THIRD OF ESOPHAGUS (H): ICD-10-CM

## 2020-02-20 LAB
ALBUMIN SERPL-MCNC: 3.6 G/DL (ref 3.4–5)
ALP SERPL-CCNC: 79 U/L (ref 40–150)
ALT SERPL W P-5'-P-CCNC: 24 U/L (ref 0–70)
ANION GAP SERPL CALCULATED.3IONS-SCNC: 5 MMOL/L (ref 3–14)
AST SERPL W P-5'-P-CCNC: 12 U/L (ref 0–45)
BASOPHILS # BLD AUTO: 0 10E9/L (ref 0–0.2)
BASOPHILS NFR BLD AUTO: 0.2 %
BILIRUB SERPL-MCNC: 0.4 MG/DL (ref 0.2–1.3)
BUN SERPL-MCNC: 17 MG/DL (ref 7–30)
CALCIUM SERPL-MCNC: 8.8 MG/DL (ref 8.5–10.1)
CHLORIDE SERPL-SCNC: 114 MMOL/L (ref 94–109)
CO2 SERPL-SCNC: 25 MMOL/L (ref 20–32)
CREAT SERPL-MCNC: 1.04 MG/DL (ref 0.66–1.25)
DIFFERENTIAL METHOD BLD: ABNORMAL
EOSINOPHIL # BLD AUTO: 0.1 10E9/L (ref 0–0.7)
EOSINOPHIL NFR BLD AUTO: 1.3 %
ERYTHROCYTE [DISTWIDTH] IN BLOOD BY AUTOMATED COUNT: 13.4 % (ref 10–15)
GFR SERPL CREATININE-BSD FRML MDRD: 78 ML/MIN/{1.73_M2}
GLUCOSE SERPL-MCNC: 102 MG/DL (ref 70–99)
HCT VFR BLD AUTO: 43.3 % (ref 40–53)
HGB BLD-MCNC: 14.4 G/DL (ref 13.3–17.7)
IMM GRANULOCYTES # BLD: 0 10E9/L (ref 0–0.4)
IMM GRANULOCYTES NFR BLD: 0.4 %
LYMPHOCYTES # BLD AUTO: 1.7 10E9/L (ref 0.8–5.3)
LYMPHOCYTES NFR BLD AUTO: 36.5 %
MCH RBC QN AUTO: 30.1 PG (ref 26.5–33)
MCHC RBC AUTO-ENTMCNC: 33.3 G/DL (ref 31.5–36.5)
MCV RBC AUTO: 91 FL (ref 78–100)
MONOCYTES # BLD AUTO: 0.3 10E9/L (ref 0–1.3)
MONOCYTES NFR BLD AUTO: 6 %
NEUTROPHILS # BLD AUTO: 2.6 10E9/L (ref 1.6–8.3)
NEUTROPHILS NFR BLD AUTO: 55.6 %
NRBC # BLD AUTO: 0 10*3/UL
NRBC BLD AUTO-RTO: 0 /100
PLATELET # BLD AUTO: 140 10E9/L (ref 150–450)
POTASSIUM SERPL-SCNC: 4 MMOL/L (ref 3.4–5.3)
PROT SERPL-MCNC: 7 G/DL (ref 6.8–8.8)
RBC # BLD AUTO: 4.78 10E12/L (ref 4.4–5.9)
SODIUM SERPL-SCNC: 144 MMOL/L (ref 133–144)
TSH SERPL DL<=0.005 MIU/L-ACNC: 3.02 MU/L (ref 0.4–4)
WBC # BLD AUTO: 4.7 10E9/L (ref 4–11)

## 2020-02-20 PROCEDURE — 96413 CHEMO IV INFUSION 1 HR: CPT

## 2020-02-20 PROCEDURE — 25800030 ZZH RX IP 258 OP 636: Mod: ZF | Performed by: INTERNAL MEDICINE

## 2020-02-20 PROCEDURE — 80053 COMPREHEN METABOLIC PANEL: CPT | Performed by: INTERNAL MEDICINE

## 2020-02-20 PROCEDURE — 25000128 H RX IP 250 OP 636: Mod: ZF | Performed by: INTERNAL MEDICINE

## 2020-02-20 PROCEDURE — 25000128 H RX IP 250 OP 636: Performed by: INTERNAL MEDICINE

## 2020-02-20 PROCEDURE — 99214 OFFICE O/P EST MOD 30 MIN: CPT | Mod: ZP | Performed by: INTERNAL MEDICINE

## 2020-02-20 PROCEDURE — G0463 HOSPITAL OUTPT CLINIC VISIT: HCPCS | Mod: ZF

## 2020-02-20 PROCEDURE — 85025 COMPLETE CBC W/AUTO DIFF WBC: CPT | Performed by: INTERNAL MEDICINE

## 2020-02-20 PROCEDURE — 84443 ASSAY THYROID STIM HORMONE: CPT | Performed by: INTERNAL MEDICINE

## 2020-02-20 RX ORDER — LORAZEPAM 2 MG/ML
0.5 INJECTION INTRAMUSCULAR EVERY 4 HOURS PRN
Status: CANCELLED
Start: 2020-02-20

## 2020-02-20 RX ORDER — SODIUM CHLORIDE 9 MG/ML
1000 INJECTION, SOLUTION INTRAVENOUS CONTINUOUS PRN
Status: CANCELLED
Start: 2020-02-20

## 2020-02-20 RX ORDER — HEPARIN SODIUM (PORCINE) LOCK FLUSH IV SOLN 100 UNIT/ML 100 UNIT/ML
500 SOLUTION INTRAVENOUS ONCE
Status: COMPLETED | OUTPATIENT
Start: 2020-02-20 | End: 2020-02-20

## 2020-02-20 RX ORDER — MEPERIDINE HYDROCHLORIDE 25 MG/ML
25 INJECTION INTRAMUSCULAR; INTRAVENOUS; SUBCUTANEOUS EVERY 30 MIN PRN
Status: CANCELLED | OUTPATIENT
Start: 2020-02-20

## 2020-02-20 RX ORDER — OXYCODONE HYDROCHLORIDE 10 MG/1
10 TABLET ORAL EVERY 4 HOURS PRN
Qty: 60 TABLET | Refills: 0 | Status: SHIPPED | OUTPATIENT
Start: 2020-02-20 | End: 2020-05-18

## 2020-02-20 RX ORDER — ALBUTEROL SULFATE 0.83 MG/ML
2.5 SOLUTION RESPIRATORY (INHALATION)
Status: CANCELLED | OUTPATIENT
Start: 2020-02-20

## 2020-02-20 RX ORDER — DIPHENOXYLATE HCL/ATROPINE 2.5-.025MG
1-2 TABLET ORAL 4 TIMES DAILY PRN
Qty: 100 TABLET | Refills: 1 | Status: SHIPPED | OUTPATIENT
Start: 2020-02-20 | End: 2021-02-10

## 2020-02-20 RX ORDER — ALBUTEROL SULFATE 90 UG/1
1-2 AEROSOL, METERED RESPIRATORY (INHALATION)
Status: CANCELLED
Start: 2020-02-20

## 2020-02-20 RX ORDER — HEPARIN SODIUM (PORCINE) LOCK FLUSH IV SOLN 100 UNIT/ML 100 UNIT/ML
5 SOLUTION INTRAVENOUS ONCE
Status: COMPLETED | OUTPATIENT
Start: 2020-02-20 | End: 2020-02-20

## 2020-02-20 RX ORDER — IOPAMIDOL 755 MG/ML
135 INJECTION, SOLUTION INTRAVASCULAR ONCE
Status: COMPLETED | OUTPATIENT
Start: 2020-02-20 | End: 2020-02-20

## 2020-02-20 RX ORDER — HEPARIN SODIUM (PORCINE) LOCK FLUSH IV SOLN 100 UNIT/ML 100 UNIT/ML
500 SOLUTION INTRAVENOUS ONCE
Status: CANCELLED | OUTPATIENT
Start: 2020-02-20

## 2020-02-20 RX ORDER — METHYLPREDNISOLONE SODIUM SUCCINATE 125 MG/2ML
125 INJECTION, POWDER, LYOPHILIZED, FOR SOLUTION INTRAMUSCULAR; INTRAVENOUS
Status: CANCELLED
Start: 2020-02-20

## 2020-02-20 RX ORDER — EPINEPHRINE 0.3 MG/.3ML
0.3 INJECTION SUBCUTANEOUS EVERY 5 MIN PRN
Status: CANCELLED | OUTPATIENT
Start: 2020-02-20

## 2020-02-20 RX ORDER — DIPHENHYDRAMINE HYDROCHLORIDE 50 MG/ML
50 INJECTION INTRAMUSCULAR; INTRAVENOUS
Status: CANCELLED
Start: 2020-02-20

## 2020-02-20 RX ORDER — EPINEPHRINE 1 MG/ML
0.3 INJECTION, SOLUTION INTRAMUSCULAR; SUBCUTANEOUS EVERY 5 MIN PRN
Status: CANCELLED | OUTPATIENT
Start: 2020-02-20

## 2020-02-20 RX ADMIN — Medication 500 UNITS: at 13:19

## 2020-02-20 RX ADMIN — IOPAMIDOL 135 ML: 755 INJECTION, SOLUTION INTRAVASCULAR at 08:17

## 2020-02-20 RX ADMIN — SODIUM CHLORIDE 200 MG: 9 INJECTION, SOLUTION INTRAVENOUS at 12:40

## 2020-02-20 RX ADMIN — HEPARIN SODIUM (PORCINE) LOCK FLUSH IV SOLN 100 UNIT/ML 500 UNITS: 100 SOLUTION at 08:41

## 2020-02-20 RX ADMIN — Medication 5 ML: at 07:49

## 2020-02-20 RX ADMIN — SODIUM CHLORIDE 250 ML: 9 INJECTION, SOLUTION INTRAVENOUS at 12:40

## 2020-02-20 ASSESSMENT — MIFFLIN-ST. JEOR: SCORE: 2604.72

## 2020-02-20 NOTE — PROGRESS NOTES
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 His tumor is positive for cytokeratin 20, negative for P63 and CK7, and HER2 is negative. He started on FOLFOX (5FU/oxaliplatin) on 5/15/2017. He had a delay in treatment from 9/5-10/2/17 due to work related injury.    He had an excellent response by imaging throughout the summer 2017.    He a mixed response by imaging in late fall 2017.    In January 2018, he was switched to taxol and cyramza - had slight progression and neuropathy and clinical trial became available.       In March 2018, he was started on the [facility] Match Clinical Trial with crizotinib.  (MET amplification) He remained on crizotinib from March - July 2018.    August 2018, he was switched back to taxol/cramza.  In October, he was switched to Keytruda (PD1 overexpressor).    In April 2019, his infusion was held for three weeks, and he was treated with prednisone for grade 3 arthralgias.  Keytruda was resumed.  He was seeing rheumatology and had a course of Enbrel.    INTERVAL HISTORY: Levi comes in today for followup. At his last visit with me, there was queston of slight local progression.  We opted to decrease his immunosuppression, and follow him closely.      He is now on Keytruda every three weeks.  He has been on a low dose of prednisone at 5 mg.  His arthralgias have been present but without impacting his daily functioning.       He has had no issues with fevers or chills, no chest pain or shortness of breath, no nausea, vomiting, diarrhea or constipation.         He has no abdminal pain.  No n/v/d/c.       His energy levels are good.       No dysphagia.        ROS: 10 point ROS neg other than the symptoms noted above in the HPI.    PHYSICAL EXAMINATION  There were no vitals taken for this  visit.   Wt Readings from Last 4 Encounters:   01/27/20 (!) 162.4 kg (358 lb)   01/07/20 (!) 162.4 kg (358 lb)   01/06/20 (!) 162.6 kg (358 lb 6.4 oz)   12/16/19 (!) 163.8 kg (361 lb 3.2 oz)     Constitutional: Alert, oriented male in no visible distress.  Eyes: PERRL. Anicteric sclerae.  ENT/Mouth: OM moist and pink without lesions or thrush.  CV: RRR  Resp: CTAB throughout  Abdomen: Soft, non-tender, non-distended. Obese. Bowel sounds present. Unable to palpate liver or spleen.  No RLQ tenderness.  Extremities: trace edema , no erythema or warmth over joints  Skin: Warm, dry. Hyperpigmentation in his lower extremities R > L, no warmth  Lymph: No cervical or supraclavicular lymphadenopathy appreciated.   Neuro: CN II-XII grossly intact.     MUSC: no erythema or warmth over shoulders, DIPs, PIPs.  Hand strength normal  ECOG PS: 1             Lab Results   Component Value Date    WBC 4.7 02/20/2020     Lab Results   Component Value Date    RBC 4.78 02/20/2020     Lab Results   Component Value Date    HGB 14.4 02/20/2020     Lab Results   Component Value Date    HCT 43.3 02/20/2020     No components found for: MCT  Lab Results   Component Value Date    MCV 91 02/20/2020     Lab Results   Component Value Date    MCH 30.1 02/20/2020     Lab Results   Component Value Date    MCHC 33.3 02/20/2020     Lab Results   Component Value Date    RDW 13.4 02/20/2020     Lab Results   Component Value Date     02/20/2020       Recent Labs   Lab Test 02/20/20  0755 01/27/20  1112    145*   POTASSIUM 4.0 3.8   CHLORIDE 114* 112*   CO2 25 28   ANIONGAP 5 6   * 96   BUN 17 16   CR 1.04 0.85   NIDHI 8.8 8.8     Liver Function Studies -   Recent Labs   Lab Test 02/20/20  0755   PROTTOTAL 7.0   ALBUMIN 3.6   BILITOTAL 0.4   ALKPHOS 79   AST 12   ALT 24     CT scan reviewed : overall decreased mass at GE junction.  No new masses or lesions.     IMPRESSION/PLAN:  1. Metastatic esophogeal adenocarcinoma. He has had treatment  with FOLFOX and then transitioned to Taxol with ramicurimab.  He was then on crizotinib on the [employer] Match study.  He progressed on this and returned on taxol/cyramza. And is now on Keytruda.    I am very pleased with how he is doing on Keytruda.  He did develop grade 3 arthralgias and was placed on prednisone and Enbrel.  He has now been tapered to 5 mg daily and is doing well with stable disease and stable arthralgias.      We discussed continuing the current course of therapy and repeating his imaging in 12 weeks.    He may be eligible for the QUILT study in the event he develops progressive disease.      Other treatment options would include irinotecan.      He does have a history of a PE.  He has been on Lovenox twice daily.  He has progressed through Xarelto in the past.  He is switching to Medicare in the future and may need to switch from Lovenox to oral Coumadin or another agent. He is going to look into options.     He does have baseline neuropathy and is on gabapentin 600/300/600.      He understands all therapies are palliative.        Kadi Dee MD

## 2020-02-20 NOTE — LETTER
2/20/2020       RE: Reuben Padilla  [address] [facility] [address]     Dear Colleague,    Thank you for referring your patient, Reuben Padilla, to the [facility]. Please see a copy of my visit note below.    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 His tumor is positive for cytokeratin 20, negative for P63 and CK7, and HER2 is negative. He started on FOLFOX (5FU/oxaliplatin) on 5/15/2017. He had a delay in treatment from 9/5-10/2/17 due to work related injury.    He had an excellent response by imaging throughout the summer 2017.    He a mixed response by imaging in late fall 2017.    In January 2018, he was switched to taxol and cyramza - had slight progression and neuropathy and clinical trial became available.       In March 2018, he was started on the [facility] Match Clinical Trial with crizotinib.  (MET amplification) He remained on crizotinib from March - July 2018.    August 2018, he was switched back to taxol/cramza.  In October, he was switched to Keytruda (PD1 overexpressor).    In April 2019, his infusion was held for three weeks, and he was treated with prednisone for grade 3 arthralgias.  Keytruda was resumed.  He was seeing rheumatology and had a course of Enbrel.    INTERVAL HISTORY: Levi comes in today for followup. At his last visit with me, there was queston of slight local progression.  We opted to decrease his immunosuppression, and follow him closely.      He is now on Keytruda every three weeks.  He has been on a low dose of prednisone at 5 mg.  His arthralgias have been present but without impacting his daily functioning.       He has had no issues with fevers or chills, no chest pain or shortness of breath, no nausea, vomiting, diarrhea or constipation.          He has no abdminal pain.  No n/v/d/c.       His energy levels are good.       No dysphagia.        ROS: 10 point ROS neg other than the symptoms noted above in the HPI.    PHYSICAL EXAMINATION  There were no vitals taken for this visit.   Wt Readings from Last 4 Encounters:   01/27/20 (!) 162.4 kg (358 lb)   01/07/20 (!) 162.4 kg (358 lb)   01/06/20 (!) 162.6 kg (358 lb 6.4 oz)   12/16/19 (!) 163.8 kg (361 lb 3.2 oz)     Constitutional: Alert, oriented male in no visible distress.  Eyes: PERRL. Anicteric sclerae.  ENT/Mouth: OM moist and pink without lesions or thrush.  CV: RRR  Resp: CTAB throughout  Abdomen: Soft, non-tender, non-distended. Obese. Bowel sounds present. Unable to palpate liver or spleen.  No RLQ tenderness.  Extremities: trace edema , no erythema or warmth over joints  Skin: Warm, dry. Hyperpigmentation in his lower extremities R > L, no warmth  Lymph: No cervical or supraclavicular lymphadenopathy appreciated.   Neuro: CN II-XII grossly intact.     MUSC: no erythema or warmth over shoulders, DIPs, PIPs.  Hand strength normal  ECOG PS: 1             Lab Results   Component Value Date    WBC 4.7 02/20/2020     Lab Results   Component Value Date    RBC 4.78 02/20/2020     Lab Results   Component Value Date    HGB 14.4 02/20/2020     Lab Results   Component Value Date    HCT 43.3 02/20/2020     No components found for: MCT  Lab Results   Component Value Date    MCV 91 02/20/2020     Lab Results   Component Value Date    MCH 30.1 02/20/2020     Lab Results   Component Value Date    MCHC 33.3 02/20/2020     Lab Results   Component Value Date    RDW 13.4 02/20/2020     Lab Results   Component Value Date     02/20/2020       Recent Labs   Lab Test 02/20/20  0755 01/27/20  1112    145*   POTASSIUM 4.0 3.8   CHLORIDE 114* 112*   CO2 25 28   ANIONGAP 5 6   * 96   BUN 17 16   CR 1.04 0.85   NIDHI 8.8 8.8     Liver Function Studies -   Recent Labs   Lab Test 02/20/20  0755   PROTTOTAL  7.0   ALBUMIN 3.6   BILITOTAL 0.4   ALKPHOS 79   AST 12   ALT 24     CT scan reviewed : overall decreased mass at GE junction.  No new masses or lesions.     IMPRESSION/PLAN:  1. Metastatic esophogeal adenocarcinoma. He has had treatment with FOLFOX and then transitioned to Taxol with ramicurimab.  He was then on crizotinib on the [employer] Match study.  He progressed on this and returned on taxol/cyramza. And is now on Keytruda.    I am very pleased with how he is doing on Keytruda.  He did develop grade 3 arthralgias and was placed on prednisone and Enbrel.  He has now been tapered to 5 mg daily and is doing well with stable disease and stable arthralgias.      We discussed continuing the current course of therapy and repeating his imaging in 12 weeks.    He may be eligible for the QUILT study in the event he develops progressive disease.      Other treatment options would include irinotecan.      He does have a history of a PE.  He has been on Lovenox twice daily.  He has progressed through Xarelto in the past.  He is switching to Medicare in the future and may need to switch from Lovenox to oral Coumadin or another agent. He is going to look into options.     He does have baseline neuropathy and is on gabapentin 600/300/600.      He understands all therapies are palliative.        Kadi Dee MD

## 2020-02-20 NOTE — NURSING NOTE
Chief Complaint   Patient presents with     Port Draw     Labs drawn via port by RN in lab.      Port accessed with 20 gauge flat needle by RN, labs collected, line flushed with saline and heparin.      Luanne Lynn, RN

## 2020-02-20 NOTE — PATIENT INSTRUCTIONS
[facility] Triage and after hours / weekends / holidays:  [phone]    Please call the triage or after hours line if you experience a temperature greater than or equal to 100.5, shaking chills, have uncontrolled nausea, vomiting and/or diarrhea, dizziness, shortness of breath, chest pain, bleeding, unexplained bruising, or if you have any other new/concerning symptoms, questions or concerns.      If you are having any concerning symptoms or wish to speak to a provider before your next infusion visit, please call your care coordinator or triage to notify them so we can adequately serve you.     If you need a refill on a narcotic prescription or other medication, please call before your infusion appointment.

## 2020-02-20 NOTE — NURSING NOTE
"Oncology Rooming Note    February 20, 2020 11:30 AM   Reuben Padilla is a 59 year old male who presents for:    Chief Complaint   Patient presents with     Oncology Clinic Visit     [facility] RETURN- ESOPHAGEAL CA     Initial Vitals: BP (!) 135/98 (BP Location: Right arm, Patient Position: Chair, Cuff Size: Adult Large)   Pulse 102   Temp 98  F (36.7  C)   Resp 18   Ht 1.984 m (6' 6.11\")   Wt (!) 165.5 kg (364 lb 12.8 oz)   SpO2 94%   BMI 42.04 kg/m   Estimated body mass index is 42.04 kg/m  as calculated from the following:    Height as of this encounter: 1.984 m (6' 6.11\").    Weight as of this encounter: 165.5 kg (364 lb 12.8 oz). Body surface area is 3.02 meters squared.  Data Unavailable Comment: Data Unavailable   No LMP for male patient.  Allergies reviewed: Yes  Medications reviewed: Yes    Medications: Medication refills not needed today.  Pharmacy name entered into [facility]: [id number] - [address]    Clinical concerns: No new concerns. Dr. Dee was notified.      Haider Meraz, KINSEY            "

## 2020-02-20 NOTE — PROGRESS NOTES
Infusion Nursing Note:  Reuben Padilla presents today for Cycle 22 Keytruda.    Patient seen and examined by Dr Dee in clinic prior to infusion        Intravenous Access:  Implanted Port.    Treatment Conditions:  Lab Results   Component Value Date    HGB 14.4 02/20/2020     Lab Results   Component Value Date    WBC 4.7 02/20/2020      Lab Results   Component Value Date    ANEU 2.6 02/20/2020     Lab Results   Component Value Date     02/20/2020      Lab Results   Component Value Date     02/20/2020                   Lab Results   Component Value Date    POTASSIUM 4.0 02/20/2020           Lab Results   Component Value Date    MAG 1.9 11/07/2018            Lab Results   Component Value Date    CR 1.04 02/20/2020                   Lab Results   Component Value Date    NIDHI 8.8 02/20/2020                Lab Results   Component Value Date    BILITOTAL 0.4 02/20/2020           Lab Results   Component Value Date    ALBUMIN 3.6 02/20/2020                    Lab Results   Component Value Date    ALT 24 02/20/2020           Lab Results   Component Value Date    AST 12 02/20/2020       Results reviewed, labs MET treatment parameters, ok to proceed with treatment.      Post Infusion Assessment:  Patient tolerated infusion without incident.       Discharge Plan:   Patient declined prescription refills.  Copy of AVS reviewed with patient and/or family.  Patient will return 3/16/2020 for cycle 23. Face to Face time: 0.    Lakeshia Edwards RN

## 2020-02-21 ENCOUNTER — TELEPHONE (OUTPATIENT)
Dept: ONCOLOGY | Facility: CLINIC | Age: 60
End: 2020-02-21

## 2020-02-21 NOTE — TELEPHONE ENCOUNTER
Left detailed voicemail telling Levi his next visits are on MyChart, and explaining that the visit in April can't be with Nicole due to her being out of office.     He was notified he will see Katalina, and given the scheduling line [phone] if he has any questions.     Mansi Wetzel RNCC can help him if necessary, as well.

## 2020-03-09 ENCOUNTER — PATIENT OUTREACH (OUTPATIENT)
Dept: ONCOLOGY | Facility: CLINIC | Age: 60
End: 2020-03-09

## 2020-03-09 ENCOUNTER — MYC MEDICAL ADVICE (OUTPATIENT)
Dept: ONCOLOGY | Facility: CLINIC | Age: 60
End: 2020-03-09

## 2020-03-09 DIAGNOSIS — Z86.711 HX OF PULMONARY EMBOLUS: ICD-10-CM

## 2020-03-09 DIAGNOSIS — I26.99 ACUTE PULMONARY EMBOLISM (H): Primary | ICD-10-CM

## 2020-03-09 DIAGNOSIS — I26.99 OTHER ACUTE PULMONARY EMBOLISM WITHOUT ACUTE COR PULMONALE (H): ICD-10-CM

## 2020-03-09 RX ORDER — WARFARIN SODIUM 5 MG/1
5 TABLET ORAL DAILY
Qty: 90 TABLET | Refills: 3 | Status: SHIPPED | OUTPATIENT
Start: 2020-03-09 | End: 2020-03-27

## 2020-03-09 NOTE — TELEPHONE ENCOUNTER
Spoke with Levi to discuss his switch from Lovenox to Coumadin. He has a change in insurance and the lovenox is now unaffordable.  Plan to start Coumadin after his appointment on 3/16 with Paula Rodriguez, he has enough lovenox to get through that time.  Referral placed to INR clinic, he will need labs locally at [facility] in [place] fax ([phone]).  Prescription sent to [facility] in [place] at his request.

## 2020-03-10 ENCOUNTER — ANTICOAGULATION THERAPY VISIT (OUTPATIENT)
Dept: ANTICOAGULATION | Facility: CLINIC | Age: 60
End: 2020-03-10

## 2020-03-10 DIAGNOSIS — Z86.711 HX OF PULMONARY EMBOLUS: ICD-10-CM

## 2020-03-10 DIAGNOSIS — I26.99 OTHER ACUTE PULMONARY EMBOLISM WITHOUT ACUTE COR PULMONALE (H): ICD-10-CM

## 2020-03-10 NOTE — PROGRESS NOTES
Patient is currently on Enoxaparin 100mg BID.  Due to insurance issues patient needs to transition to warfarin.  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    Writer introduced self and clinic to patient. HIPPA and education is mailed to patient.     Orders are that patient is to start warfarin on 3/17 but patient reports that he is leaving town for a week or 2 after 3/16 appointment.  Patient will not be able to have the needed blood work done while traveling.  Patient will discuss this at 3/16 appointment and [facility] clinic will follow up after appointment to discuss plan.     Orders are faxed to [facility] in [place].

## 2020-03-13 NOTE — PROGRESS NOTES
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 His tumor is positive for cytokeratin 20, negative for P63 and CK7, and HER2 is negative. He started on FOLFOX (5FU/oxaliplatin) on 5/15/2017. He had a delay in treatment from 9/5-10/2/17 due to work related injury.    He had an excellent response by imaging throughout the summer 2017.    He a mixed response by imaging in late fall 2017.    In January 2018, he was switched to taxol and cyramza - had slight progression and neuropathy and clinical trial became available.       In March 2018, he was started on the [facility] Match Clinical Trial with crizotinib.  (MET amplification) He remained on crizotinib from March - July 2018.    August 2018, he was switched back to taxol/cramza.  In October, he was switched to Keytruda (PD1 overexpressor).    In April 2019, his infusion was held for three weeks, and he was treated with prednisone and Enbrel for grade 3 arthralgias. Keytruda was resumed. He has now been tapered to 5 mg daily and is doing well with stable disease and stable arthralgias.      INTERVAL HISTORY:   He is now on Keytruda every three weeks.  He has been on a low dose of prednisone at 5 mg.  His arthralgias have been present, and wax and wane, without impacting his daily functioning.  Today his hands are stiff and reduced  strength.     He has had no issues with fevers or chills, no chest pain or shortness of breath, no nausea, vomiting, diarrhea or constipation.  No rash       He has no dysphagia or abdminal pain.  No n/v/d/c.       He is planning to start Coumadin today. No b/b.         ROS: 10 point ROS neg other than the symptoms noted above in the HPI.    PHYSICAL EXAMINATION  /81   Pulse  103   Temp 97  F (36.1  C) (Oral)   Resp 16   Wt (!) 166 kg (365 lb 14.4 oz)   SpO2 98%   BMI 42.16 kg/m     Wt Readings from Last 4 Encounters:   03/16/20 (!) 166 kg (365 lb 14.4 oz)   02/20/20 (!) 165.5 kg (364 lb 12.8 oz)   01/27/20 (!) 162.4 kg (358 lb)   01/07/20 (!) 162.4 kg (358 lb)     Constitutional: Alert, oriented male in no visible distress.  Eyes: PERRL. Anicteric sclerae.  ENT/Mouth: OM moist and pink without lesions or thrush.  CV: RRR  Resp: CTAB throughout  Abdomen: not examined today  Extremities: trace edema , no erythema or warmth over joints.  Stiffness in bilateral hands R>L  Skin: Warm, dry. Hyperpigmentation in his lower extremities R > L, no warmth  Lymph: No cervical or supraclavicular lymphadenopathy appreciated.   Neuro: CN II-XII grossly intact.     MUSC: no erythema or warmth over shoulders, DIPs, PIPs.   strength 4/5 in bilateral hands.  ECOG PS: 1        Labs:  Results for SIMRAN CHANEY (MRN [id number]) as of 3/16/2020 12:43   Ref. Range 3/16/2020 11:52   Sodium Latest Ref Range: 133 - 144 mmol/L 145 (H)   Potassium Latest Ref Range: 3.4 - 5.3 mmol/L 3.8   Chloride Latest Ref Range: 94 - 109 mmol/L 114 (H)   Carbon Dioxide Latest Ref Range: 20 - 32 mmol/L 26   Urea Nitrogen Latest Ref Range: 7 - 30 mg/dL 16   Creatinine Latest Ref Range: 0.66 - 1.25 mg/dL 0.84   GFR Estimate Latest Ref Range: >60 mL/min/1.73_m2 >90   GFR Estimate If Black Latest Ref Range: >60 mL/min/1.73_m2 >90   Calcium Latest Ref Range: 8.5 - 10.1 mg/dL 8.7   Anion Gap Latest Ref Range: 3 - 14 mmol/L 5   Albumin Latest Ref Range: 3.4 - 5.0 g/dL 3.7   Protein Total Latest Ref Range: 6.8 - 8.8 g/dL 7.2   Bilirubin Total Latest Ref Range: 0.2 - 1.3 mg/dL 0.6   Alkaline Phosphatase Latest Ref Range: 40 - 150 U/L 79   ALT Latest Ref Range: 0 - 70 U/L 22   AST Latest Ref Range: 0 - 45 U/L 11   TSH Latest Ref Range: 0.40 - 4.00 mU/L 1.13   Glucose Latest Ref Range: 70 - 99 mg/dL 108 (H)   WBC Latest Ref Range:  4.0 - 11.0 10e9/L 4.3   Hemoglobin Latest Ref Range: 13.3 - 17.7 g/dL 14.4   Hematocrit Latest Ref Range: 40.0 - 53.0 % 43.7   Platelet Count Latest Ref Range: 150 - 450 10e9/L 151   RBC Count Latest Ref Range: 4.4 - 5.9 10e12/L 4.84   MCV Latest Ref Range: 78 - 100 fl 90   MCH Latest Ref Range: 26.5 - 33.0 pg 29.8   MCHC Latest Ref Range: 31.5 - 36.5 g/dL 33.0   RDW Latest Ref Range: 10.0 - 15.0 % 13.5   Diff Method Unknown Automated Method   % Neutrophils Latest Units: % 64.0   % Lymphocytes Latest Units: % 28.7   % Monocytes Latest Units: % 5.8   % Eosinophils Latest Units: % 0.5   % Basophils Latest Units: % 0.5   % Immature Granulocytes Latest Units: % 0.5   Nucleated RBCs Latest Ref Range: 0 /100 0   Absolute Neutrophil Latest Ref Range: 1.6 - 8.3 10e9/L 2.8   Absolute Lymphocytes Latest Ref Range: 0.8 - 5.3 10e9/L 1.2   Absolute Monocytes Latest Ref Range: 0.0 - 1.3 10e9/L 0.3   Absolute Eosinophils Latest Ref Range: 0.0 - 0.7 10e9/L 0.0   Absolute Basophils Latest Ref Range: 0.0 - 0.2 10e9/L 0.0   Abs Immature Granulocytes Latest Ref Range: 0 - 0.4 10e9/L 0.0   Absolute Nucleated RBC Unknown 0.0   INR Latest Ref Range: 0.86 - 1.14  1.15 (H)     IMPRESSION/PLAN:  1. Metastatic esophogeal adenocarcinoma. He has stable disease on Keytruda. He did develop grade 3 arthralgias and was placed on prednisone and Enbrel.  He has now been tapered to 5 mg daily and is doing well with stable disease and stable arthralgias.  We discussed continuing the current course of therapy and repeating his imaging in May.     2. H/o PE 2018.   He has progressed through Xarelto in the past (didn't take it regularly).  He was then on lovenox but his insurance changed and this became unaffordable. He will be switching to Coumadin 5 mg and start tonight. He has discussed the risks and benefits with Dr. Dee and we addressed this again today. He understands that coumadin will be need careful monitoring and stability of his diet. He will  notify them when he starts any new meds. His labs will be drawn at [facility] in [facility] ([phone]).  He will be monitored by our coumadin clinic. I sent Carola Box a message.     3. Baseline neuropathy and got worse with chemo and is on gabapentin 600/300/600 with relief    4. Arthralgias. Oxycodone prn (once a week currently) and Prednisone 5 mg. His sx wax and wane and today is a bad day but overall his sx have been stable and he wishes to proceed. He will call us if worsens.     5. Insomnia. Ambien qhs     Over 50% of 25 min visit was spent counseling and coordinating care    Paula Rodriguez PA-C

## 2020-03-15 NOTE — TELEPHONE ENCOUNTER
Spoke to patient on the phone, Paula Rodriguez will see him in clinic tomorrow. He plans to come in.    Wendy Chang RN

## 2020-03-16 ENCOUNTER — INFUSION THERAPY VISIT (OUTPATIENT)
Dept: ONCOLOGY | Facility: CLINIC | Age: 60
End: 2020-03-16
Attending: INTERNAL MEDICINE
Payer: MEDICARE

## 2020-03-16 ENCOUNTER — APPOINTMENT (OUTPATIENT)
Dept: LAB | Facility: CLINIC | Age: 60
End: 2020-03-16
Attending: INTERNAL MEDICINE
Payer: MEDICARE

## 2020-03-16 ENCOUNTER — ANTICOAGULATION THERAPY VISIT (OUTPATIENT)
Dept: ANTICOAGULATION | Facility: CLINIC | Age: 60
End: 2020-03-16

## 2020-03-16 ENCOUNTER — ONCOLOGY VISIT (OUTPATIENT)
Dept: ONCOLOGY | Facility: CLINIC | Age: 60
End: 2020-03-16
Attending: PHYSICIAN ASSISTANT
Payer: MEDICARE

## 2020-03-16 VITALS
WEIGHT: 315 LBS | RESPIRATION RATE: 16 BRPM | BODY MASS INDEX: 42.16 KG/M2 | SYSTOLIC BLOOD PRESSURE: 122 MMHG | OXYGEN SATURATION: 98 % | HEART RATE: 103 BPM | TEMPERATURE: 97 F | DIASTOLIC BLOOD PRESSURE: 81 MMHG

## 2020-03-16 DIAGNOSIS — Z86.711 HX OF PULMONARY EMBOLUS: ICD-10-CM

## 2020-03-16 DIAGNOSIS — C15.5 CANCER OF DISTAL THIRD OF ESOPHAGUS (H): Primary | ICD-10-CM

## 2020-03-16 DIAGNOSIS — M25.541 ARTHRALGIA OF BOTH HANDS: ICD-10-CM

## 2020-03-16 DIAGNOSIS — D70.1 CHEMOTHERAPY-INDUCED NEUTROPENIA (H): ICD-10-CM

## 2020-03-16 DIAGNOSIS — T45.1X5A CHEMOTHERAPY-INDUCED NEUTROPENIA (H): ICD-10-CM

## 2020-03-16 DIAGNOSIS — M25.542 ARTHRALGIA OF BOTH HANDS: ICD-10-CM

## 2020-03-16 DIAGNOSIS — I26.99 OTHER ACUTE PULMONARY EMBOLISM WITHOUT ACUTE COR PULMONALE (H): ICD-10-CM

## 2020-03-16 LAB
ALBUMIN SERPL-MCNC: 3.7 G/DL (ref 3.4–5)
ALP SERPL-CCNC: 79 U/L (ref 40–150)
ALT SERPL W P-5'-P-CCNC: 22 U/L (ref 0–70)
ANION GAP SERPL CALCULATED.3IONS-SCNC: 5 MMOL/L (ref 3–14)
AST SERPL W P-5'-P-CCNC: 11 U/L (ref 0–45)
BASOPHILS # BLD AUTO: 0 10E9/L (ref 0–0.2)
BASOPHILS NFR BLD AUTO: 0.5 %
BILIRUB SERPL-MCNC: 0.6 MG/DL (ref 0.2–1.3)
BUN SERPL-MCNC: 16 MG/DL (ref 7–30)
CALCIUM SERPL-MCNC: 8.7 MG/DL (ref 8.5–10.1)
CHLORIDE SERPL-SCNC: 114 MMOL/L (ref 94–109)
CO2 SERPL-SCNC: 26 MMOL/L (ref 20–32)
CREAT SERPL-MCNC: 0.84 MG/DL (ref 0.66–1.25)
DIFFERENTIAL METHOD BLD: NORMAL
EOSINOPHIL # BLD AUTO: 0 10E9/L (ref 0–0.7)
EOSINOPHIL NFR BLD AUTO: 0.5 %
ERYTHROCYTE [DISTWIDTH] IN BLOOD BY AUTOMATED COUNT: 13.5 % (ref 10–15)
GFR SERPL CREATININE-BSD FRML MDRD: >90 ML/MIN/{1.73_M2}
GLUCOSE SERPL-MCNC: 108 MG/DL (ref 70–99)
HCT VFR BLD AUTO: 43.7 % (ref 40–53)
HGB BLD-MCNC: 14.4 G/DL (ref 13.3–17.7)
IMM GRANULOCYTES # BLD: 0 10E9/L (ref 0–0.4)
IMM GRANULOCYTES NFR BLD: 0.5 %
INR PPP: 1.15 (ref 0.86–1.14)
LYMPHOCYTES # BLD AUTO: 1.2 10E9/L (ref 0.8–5.3)
LYMPHOCYTES NFR BLD AUTO: 28.7 %
MCH RBC QN AUTO: 29.8 PG (ref 26.5–33)
MCHC RBC AUTO-ENTMCNC: 33 G/DL (ref 31.5–36.5)
MCV RBC AUTO: 90 FL (ref 78–100)
MONOCYTES # BLD AUTO: 0.3 10E9/L (ref 0–1.3)
MONOCYTES NFR BLD AUTO: 5.8 %
NEUTROPHILS # BLD AUTO: 2.8 10E9/L (ref 1.6–8.3)
NEUTROPHILS NFR BLD AUTO: 64 %
NRBC # BLD AUTO: 0 10*3/UL
NRBC BLD AUTO-RTO: 0 /100
PLATELET # BLD AUTO: 151 10E9/L (ref 150–450)
POTASSIUM SERPL-SCNC: 3.8 MMOL/L (ref 3.4–5.3)
PROT SERPL-MCNC: 7.2 G/DL (ref 6.8–8.8)
RBC # BLD AUTO: 4.84 10E12/L (ref 4.4–5.9)
SODIUM SERPL-SCNC: 145 MMOL/L (ref 133–144)
TSH SERPL DL<=0.005 MIU/L-ACNC: 1.13 MU/L (ref 0.4–4)
WBC # BLD AUTO: 4.3 10E9/L (ref 4–11)

## 2020-03-16 PROCEDURE — 25000128 H RX IP 250 OP 636: Mod: ZF | Performed by: PHYSICIAN ASSISTANT

## 2020-03-16 PROCEDURE — 80053 COMPREHEN METABOLIC PANEL: CPT | Performed by: PHYSICIAN ASSISTANT

## 2020-03-16 PROCEDURE — 85610 PROTHROMBIN TIME: CPT | Performed by: INTERNAL MEDICINE

## 2020-03-16 PROCEDURE — 84443 ASSAY THYROID STIM HORMONE: CPT | Performed by: PHYSICIAN ASSISTANT

## 2020-03-16 PROCEDURE — 99214 OFFICE O/P EST MOD 30 MIN: CPT | Mod: ZP | Performed by: PHYSICIAN ASSISTANT

## 2020-03-16 PROCEDURE — 25800030 ZZH RX IP 258 OP 636: Mod: ZF | Performed by: PHYSICIAN ASSISTANT

## 2020-03-16 PROCEDURE — G0463 HOSPITAL OUTPT CLINIC VISIT: HCPCS | Mod: ZF

## 2020-03-16 PROCEDURE — 85025 COMPLETE CBC W/AUTO DIFF WBC: CPT | Performed by: PHYSICIAN ASSISTANT

## 2020-03-16 PROCEDURE — 96413 CHEMO IV INFUSION 1 HR: CPT

## 2020-03-16 RX ORDER — MEPERIDINE HYDROCHLORIDE 25 MG/ML
25 INJECTION INTRAMUSCULAR; INTRAVENOUS; SUBCUTANEOUS EVERY 30 MIN PRN
Status: CANCELLED | OUTPATIENT
Start: 2020-03-16

## 2020-03-16 RX ORDER — ALBUTEROL SULFATE 0.83 MG/ML
2.5 SOLUTION RESPIRATORY (INHALATION)
Status: CANCELLED | OUTPATIENT
Start: 2020-03-16

## 2020-03-16 RX ORDER — HEPARIN SODIUM (PORCINE) LOCK FLUSH IV SOLN 100 UNIT/ML 100 UNIT/ML
500 SOLUTION INTRAVENOUS ONCE
Status: CANCELLED | OUTPATIENT
Start: 2020-03-16

## 2020-03-16 RX ORDER — HEPARIN SODIUM (PORCINE) LOCK FLUSH IV SOLN 100 UNIT/ML 100 UNIT/ML
5 SOLUTION INTRAVENOUS DAILY PRN
Status: DISCONTINUED | OUTPATIENT
Start: 2020-03-16 | End: 2020-03-16 | Stop reason: HOSPADM

## 2020-03-16 RX ORDER — DIPHENHYDRAMINE HYDROCHLORIDE 50 MG/ML
50 INJECTION INTRAMUSCULAR; INTRAVENOUS
Status: CANCELLED
Start: 2020-03-16

## 2020-03-16 RX ORDER — METHYLPREDNISOLONE SODIUM SUCCINATE 125 MG/2ML
125 INJECTION, POWDER, LYOPHILIZED, FOR SOLUTION INTRAMUSCULAR; INTRAVENOUS
Status: CANCELLED
Start: 2020-03-16

## 2020-03-16 RX ORDER — EPINEPHRINE 1 MG/ML
0.3 INJECTION, SOLUTION INTRAMUSCULAR; SUBCUTANEOUS EVERY 5 MIN PRN
Status: CANCELLED | OUTPATIENT
Start: 2020-03-16

## 2020-03-16 RX ORDER — HEPARIN SODIUM (PORCINE) LOCK FLUSH IV SOLN 100 UNIT/ML 100 UNIT/ML
500 SOLUTION INTRAVENOUS ONCE
Status: COMPLETED | OUTPATIENT
Start: 2020-03-16 | End: 2020-03-16

## 2020-03-16 RX ORDER — ALBUTEROL SULFATE 90 UG/1
1-2 AEROSOL, METERED RESPIRATORY (INHALATION)
Status: CANCELLED
Start: 2020-03-16

## 2020-03-16 RX ORDER — EPINEPHRINE 0.3 MG/.3ML
0.3 INJECTION SUBCUTANEOUS EVERY 5 MIN PRN
Status: CANCELLED | OUTPATIENT
Start: 2020-03-16

## 2020-03-16 RX ORDER — LORAZEPAM 2 MG/ML
0.5 INJECTION INTRAMUSCULAR EVERY 4 HOURS PRN
Status: CANCELLED
Start: 2020-03-16

## 2020-03-16 RX ORDER — SODIUM CHLORIDE 9 MG/ML
1000 INJECTION, SOLUTION INTRAVENOUS CONTINUOUS PRN
Status: CANCELLED
Start: 2020-03-16

## 2020-03-16 RX ADMIN — SODIUM CHLORIDE 200 MG: 9 INJECTION, SOLUTION INTRAVENOUS at 14:26

## 2020-03-16 RX ADMIN — Medication 500 UNITS: at 15:09

## 2020-03-16 RX ADMIN — SODIUM CHLORIDE 250 ML: 9 INJECTION, SOLUTION INTRAVENOUS at 14:26

## 2020-03-16 RX ADMIN — Medication 5 ML: at 11:47

## 2020-03-16 ASSESSMENT — PAIN SCALES - GENERAL: PAINLEVEL: SEVERE PAIN (6)

## 2020-03-16 NOTE — PROGRESS NOTES
ANTICOAGULATION FOLLOW-UP CLINIC VISIT    Patient Name:  Reuben Padilla  Date:  3/16/2020  Contact Type:  Telephone    SUBJECTIVE:  Patient Findings     Positives:   Change in activity (Plan to travel has been canceled.)             OBJECTIVE    INR   Date Value Ref Range Status   2020 1.15 (H) 0.86 - 1.14 Final       ASSESSMENT / PLAN  INR assessment SUB    Recheck INR In: 4 DAYS    INR Location Clinic      Anticoagulation Summary  As of 3/16/2020    INR goal:   2.0-3.0   TTR:   --   INR used for dosin.15! (3/16/2020)   Warfarin maintenance plan:   No maintenance plan   Full warfarin instructions:   316: 5 mg; 3/17: 5 mg; 3/18: 5 mg; 3/19: 5 mg; Otherwise No maintenance plan   Next INR check:   3/20/2020   Target end date:   Indefinite    Indications    Acute pulmonary embolism (H) [I26.99]  Hx of pulmonary embolus [Z86.711]  Other acute pulmonary embolism without acute cor pulmonale (H) [I26.99]             Anticoagulation Episode Summary     INR check location:       Preferred lab:       Send INR reminders to:   [facility]    Comments:   [facility] (P)[phone] (F)[phone]      Anticoagulation Care Providers     Provider Role Specialty Phone number    Kadi Dee MD Referring Hematology [phone]            See the Encounter Report to view Anticoagulation Flowsheet and Dosing Calendar (Go to Encounters tab in chart review, and find the Anticoagulation Therapy Visit)  Spoke with patient.  Lovenox 100mg to continue until INR is in goal range of 2-3.    Alley Alarcon RN

## 2020-03-16 NOTE — NURSING NOTE
"Oncology Rooming Note    March 16, 2020 12:16 PM   Reuben Padilla is a 59 year old male who presents for:    Chief Complaint   Patient presents with     Port Draw     Labs drawn via port by RN in lab. VS taken.      Oncology Clinic Visit     Return Metastatic Esophageal Ca     Initial Vitals: /81   Pulse 103   Temp 97  F (36.1  C) (Oral)   Resp 16   Wt (!) 166 kg (365 lb 14.4 oz)   SpO2 98%   BMI 42.16 kg/m   Estimated body mass index is 42.16 kg/m  as calculated from the following:    Height as of 2/20/20: 1.984 m (6' 6.11\").    Weight as of this encounter: 166 kg (365 lb 14.4 oz). Body surface area is 3.02 meters squared.  Severe Pain (6) Comment: Data Unavailable   No LMP for male patient.  Allergies reviewed: Yes  Medications reviewed: Yes    Medications: Medication refills not needed today.  Pharmacy name entered into EPIC:    [id number] - [place], [place] - [address] [facility] - [place], [place] - [address] [place] [address]    Clinical concerns: more information about the warfarin.      Ambika Robert, [facility]              "

## 2020-03-17 NOTE — PROGRESS NOTES
Addendum 3/17/20 Pt called  office reporting that [facility] didn't get our orders (this was sent via [employer]). Writer called pt and encouraged him to reach out to the Coumadin clinic regarding questions with Coumadin questions, but did inform him that we did indeed fax an order on 3/10. Pt reports he tried to make an appointment and [facility] reports no orders. Writer called the lab at [facility] and the  reports that they are having troubles with their fax machine and gave writer a different fax # [phone]. Writer called pt and let him know that a standing order was sent. Delfina Thomas RN

## 2020-03-18 ENCOUNTER — MYC MEDICAL ADVICE (OUTPATIENT)
Dept: ONCOLOGY | Facility: CLINIC | Age: 60
End: 2020-03-18

## 2020-03-19 RX ORDER — EPINEPHRINE 1 MG/ML
0.3 INJECTION, SOLUTION INTRAMUSCULAR; SUBCUTANEOUS EVERY 5 MIN PRN
Status: CANCELLED | OUTPATIENT
Start: 2020-04-09

## 2020-03-19 RX ORDER — EPINEPHRINE 0.3 MG/.3ML
0.3 INJECTION SUBCUTANEOUS EVERY 5 MIN PRN
Status: CANCELLED | OUTPATIENT
Start: 2020-05-03

## 2020-03-19 RX ORDER — DIPHENHYDRAMINE HYDROCHLORIDE 50 MG/ML
50 INJECTION INTRAMUSCULAR; INTRAVENOUS
Status: CANCELLED
Start: 2020-04-09

## 2020-03-19 RX ORDER — METHYLPREDNISOLONE SODIUM SUCCINATE 125 MG/2ML
125 INJECTION, POWDER, LYOPHILIZED, FOR SOLUTION INTRAMUSCULAR; INTRAVENOUS
Status: CANCELLED
Start: 2020-05-03

## 2020-03-19 RX ORDER — ALBUTEROL SULFATE 0.83 MG/ML
2.5 SOLUTION RESPIRATORY (INHALATION)
Status: CANCELLED | OUTPATIENT
Start: 2020-05-03

## 2020-03-19 RX ORDER — SODIUM CHLORIDE 9 MG/ML
1000 INJECTION, SOLUTION INTRAVENOUS CONTINUOUS PRN
Status: CANCELLED
Start: 2020-05-03

## 2020-03-19 RX ORDER — SODIUM CHLORIDE 9 MG/ML
1000 INJECTION, SOLUTION INTRAVENOUS CONTINUOUS PRN
Status: CANCELLED
Start: 2020-04-09

## 2020-03-19 RX ORDER — LORAZEPAM 2 MG/ML
0.5 INJECTION INTRAMUSCULAR EVERY 4 HOURS PRN
Status: CANCELLED
Start: 2020-04-09

## 2020-03-19 RX ORDER — HEPARIN SODIUM (PORCINE) LOCK FLUSH IV SOLN 100 UNIT/ML 100 UNIT/ML
500 SOLUTION INTRAVENOUS ONCE
Status: CANCELLED | OUTPATIENT
Start: 2020-05-03

## 2020-03-19 RX ORDER — HEPARIN SODIUM (PORCINE) LOCK FLUSH IV SOLN 100 UNIT/ML 100 UNIT/ML
500 SOLUTION INTRAVENOUS ONCE
Status: CANCELLED | OUTPATIENT
Start: 2020-04-09

## 2020-03-19 RX ORDER — MEPERIDINE HYDROCHLORIDE 25 MG/ML
25 INJECTION INTRAMUSCULAR; INTRAVENOUS; SUBCUTANEOUS EVERY 30 MIN PRN
Status: CANCELLED | OUTPATIENT
Start: 2020-05-03

## 2020-03-19 RX ORDER — ALBUTEROL SULFATE 90 UG/1
1-2 AEROSOL, METERED RESPIRATORY (INHALATION)
Status: CANCELLED
Start: 2020-04-09

## 2020-03-19 RX ORDER — METHYLPREDNISOLONE SODIUM SUCCINATE 125 MG/2ML
125 INJECTION, POWDER, LYOPHILIZED, FOR SOLUTION INTRAMUSCULAR; INTRAVENOUS
Status: CANCELLED
Start: 2020-04-09

## 2020-03-19 RX ORDER — DIPHENHYDRAMINE HYDROCHLORIDE 50 MG/ML
50 INJECTION INTRAMUSCULAR; INTRAVENOUS
Status: CANCELLED
Start: 2020-05-03

## 2020-03-19 RX ORDER — ALBUTEROL SULFATE 0.83 MG/ML
2.5 SOLUTION RESPIRATORY (INHALATION)
Status: CANCELLED | OUTPATIENT
Start: 2020-04-09

## 2020-03-19 RX ORDER — LORAZEPAM 2 MG/ML
0.5 INJECTION INTRAMUSCULAR EVERY 4 HOURS PRN
Status: CANCELLED
Start: 2020-05-03

## 2020-03-19 RX ORDER — EPINEPHRINE 0.3 MG/.3ML
0.3 INJECTION SUBCUTANEOUS EVERY 5 MIN PRN
Status: CANCELLED | OUTPATIENT
Start: 2020-04-09

## 2020-03-19 RX ORDER — MEPERIDINE HYDROCHLORIDE 25 MG/ML
25 INJECTION INTRAMUSCULAR; INTRAVENOUS; SUBCUTANEOUS EVERY 30 MIN PRN
Status: CANCELLED | OUTPATIENT
Start: 2020-04-09

## 2020-03-19 RX ORDER — ALBUTEROL SULFATE 90 UG/1
1-2 AEROSOL, METERED RESPIRATORY (INHALATION)
Status: CANCELLED
Start: 2020-05-03

## 2020-03-19 RX ORDER — EPINEPHRINE 1 MG/ML
0.3 INJECTION, SOLUTION INTRAMUSCULAR; SUBCUTANEOUS EVERY 5 MIN PRN
Status: CANCELLED | OUTPATIENT
Start: 2020-05-03

## 2020-03-20 ENCOUNTER — ANTICOAGULATION THERAPY VISIT (OUTPATIENT)
Dept: ANTICOAGULATION | Facility: CLINIC | Age: 60
End: 2020-03-20

## 2020-03-20 ENCOUNTER — PATIENT OUTREACH (OUTPATIENT)
Dept: ONCOLOGY | Facility: CLINIC | Age: 60
End: 2020-03-20

## 2020-03-20 DIAGNOSIS — Z86.711 HX OF PULMONARY EMBOLUS: ICD-10-CM

## 2020-03-20 DIAGNOSIS — I26.99 OTHER ACUTE PULMONARY EMBOLISM WITHOUT ACUTE COR PULMONALE (H): ICD-10-CM

## 2020-03-20 LAB — INR PPP: 1.8 (ref 0.9–1.1)

## 2020-03-20 NOTE — PROGRESS NOTES
ANTICOAGULATION FOLLOW-UP CLINIC VISIT    Patient Name:  Reuben Padilla  Date:  3/20/2020  Contact Type:  Telephone    SUBJECTIVE:  Patient Findings     Comments:   Spoke to Levi.  He will continue Lovenox injections.  He took his Warfarin this morning already.  Updated calendar with dosing adjustments.  He gets fingersticks at a local lab.        Clinical Outcomes     Comments:   Spoke to Levi.  He will continue Lovenox injections.  He took his Warfarin this morning already.  Updated calendar with dosing adjustments.  He gets fingersticks at a local lab.           OBJECTIVE    INR   Date Value Ref Range Status   2020 1.8 (A) 0.90 - 1.10 Final     Comment:     Fingerstick at [facility]       ASSESSMENT / PLAN  INR assessment SUB    Recheck INR In: 3 DAYS    INR Location Outside lab      Anticoagulation Summary  As of 3/20/2020    INR goal:   2.0-3.0   TTR:   0.0 % (1 d)   INR used for dosin.8! (3/20/2020)   Warfarin maintenance plan:   No maintenance plan   Full warfarin instructions:   3/20: 5 mg; 3/21: 7.5 mg; 3/22: 7.5 mg; Otherwise No maintenance plan   Next INR check:   3/23/2020   Target end date:   Indefinite    Indications    Acute pulmonary embolism (H) [I26.99]  Hx of pulmonary embolus [Z86.711]  Other acute pulmonary embolism without acute cor pulmonale (H) [I26.99]             Anticoagulation Episode Summary     INR check location:       Preferred lab:       Send INR reminders to:   [facility]    Comments:   [facility] (P)[phone] (F)[phone]      Anticoagulation Care Providers     Provider Role Specialty Phone number    Kadi Dee MD Referring Hematology [phone]            See the Encounter Report to view Anticoagulation Flowsheet and Dosing Calendar (Go to Encounters tab in chart review, and find the Anticoagulation Therapy Visit)    INR/CFX/F2 RESULT:INR result is 1.8    ASSESSMENT: Spoke to Levi.     DOSING ADJUSTMENT:continues on Lovenox, updated  calendar    NEXT INR/FACTOR X OR FACTOR II: 3/23    PROTOCOL FOLLOWED:goal 2-3    Carlos Multani, RN

## 2020-03-22 ENCOUNTER — HEALTH MAINTENANCE LETTER (OUTPATIENT)
Age: 60
End: 2020-03-22

## 2020-03-23 ENCOUNTER — ANTICOAGULATION THERAPY VISIT (OUTPATIENT)
Dept: ANTICOAGULATION | Facility: CLINIC | Age: 60
End: 2020-03-23

## 2020-03-23 DIAGNOSIS — Z86.711 HX OF PULMONARY EMBOLUS: ICD-10-CM

## 2020-03-23 DIAGNOSIS — I26.99 OTHER ACUTE PULMONARY EMBOLISM WITHOUT ACUTE COR PULMONALE (H): ICD-10-CM

## 2020-03-23 LAB — INR PPP: 3.2 (ref 0.9–1.1)

## 2020-03-23 NOTE — PROGRESS NOTES
ANTICOAGULATION FOLLOW-UP CLINIC VISIT    Patient Name:  Reuben Padilla  Date:  3/23/2020  Contact Type:  Telephone    SUBJECTIVE:  Patient Findings     Comments:   Instructions were given to stop Lovenox.  Patient has already taken warfarin today.  Instructions were given to hold off on taking warfarin until after speaking with [facility] clinic on 3/26.        Clinical Outcomes     Comments:   Instructions were given to stop Lovenox.  Patient has already taken warfarin today.  Instructions were given to hold off on taking warfarin until after speaking with [facility] clinic on 3/26.           OBJECTIVE    INR   Date Value Ref Range Status   03/23/2020 3.2 (A) 0.90 - 1.10 Final       ASSESSMENT / PLAN  No question data found.  Anticoagulation Summary  As of 3/23/2020    INR goal:   2.0-3.0   TTR:   53.6 % (4 d)   INR used for dosing:   3.2! (3/23/2020)   Warfarin maintenance plan:   No maintenance plan   Full warfarin instructions:   3/23: 5 mg; 3/24: 2.5 mg; 3/25: 5 mg; Otherwise No maintenance plan   Next INR check:   3/26/2020   Target end date:   Indefinite    Indications    Acute pulmonary embolism (H) [I26.99]  Hx of pulmonary embolus [Z86.711]  Other acute pulmonary embolism without acute cor pulmonale (H) [I26.99]             Anticoagulation Episode Summary     INR check location:       Preferred lab:       Send INR reminders to:   [facility]    Comments:   [facility] (P)[phone] (F)[phone], Takes Warfarin in the am      Anticoagulation Care Providers     Provider Role Specialty Phone number    Kadi Dee MD Referring Hematology [phone]            See the Encounter Report to view Anticoagulation Flowsheet and Dosing Calendar (Go to Encounters tab in chart review, and find the Anticoagulation Therapy Visit)    Spoke with patient.     Carola Box RN

## 2020-03-26 ENCOUNTER — ANTICOAGULATION THERAPY VISIT (OUTPATIENT)
Dept: ANTICOAGULATION | Facility: CLINIC | Age: 60
End: 2020-03-26

## 2020-03-26 DIAGNOSIS — I26.99 OTHER ACUTE PULMONARY EMBOLISM WITHOUT ACUTE COR PULMONALE (H): ICD-10-CM

## 2020-03-26 DIAGNOSIS — I26.99 ACUTE PULMONARY EMBOLISM (H): ICD-10-CM

## 2020-03-26 DIAGNOSIS — Z86.711 HX OF PULMONARY EMBOLUS: ICD-10-CM

## 2020-03-26 LAB — INR PPP: 2.6 (ref 0.9–1.1)

## 2020-03-26 NOTE — PROGRESS NOTES
ANTICOAGULATION FOLLOW-UP CLINIC VISIT    Patient Name:  Reuben Padilla  Date:  3/26/2020  Contact Type:  Telephone    SUBJECTIVE:  Patient Findings         Clinical Outcomes     Negatives:   Major bleeding event, Thromboembolic event, Anticoagulation-related hospital admission, Anticoagulation-related ED visit, Anticoagulation-related fatality           OBJECTIVE    INR   Date Value Ref Range Status   2020 2.6 (A) 0.90 - 1.10 Final       ASSESSMENT / PLAN  INR assessment THER    Recheck INR In: 4 DAYS    INR Location Outside lab      Anticoagulation Summary  As of 3/26/2020    INR goal:   2.0-3.0   TTR:   59.2 % (1 wk)   INR used for dosin.6 (3/26/2020)   Warfarin maintenance plan:   5 mg (5 mg x 1) every day   Full warfarin instructions:   5 mg every day   Weekly warfarin total:   35 mg   Plan last modified:   Mukul Gonzalez RPH (3/26/2020)   Next INR check:   3/30/2020   Target end date:   Indefinite    Indications    Acute pulmonary embolism (H) [I26.99]  Hx of pulmonary embolus [Z86.711]  Other acute pulmonary embolism without acute cor pulmonale (H) [I26.99]             Anticoagulation Episode Summary     INR check location:       Preferred lab:       Send INR reminders to:   [facility]    Comments:   [facility] (P)[phone] (F)[phone], Takes Warfarin in the am      Anticoagulation Care Providers     Provider Role Specialty Phone number    Kadi Dee MD Referring Hematology [phone]            See the Encounter Report to view Anticoagulation Flowsheet and Dosing Calendar (Go to Encounters tab in chart review, and find the Anticoagulation Therapy Visit)      Spoke with patient. He called to report his INR results We started a new warfarin maintenance dose.  No changes in health, medication, or diet. No missed doses, no falls. No signs or symptoms of bleed or clotting.        Mukul Gonzalez RPH

## 2020-03-27 DIAGNOSIS — T45.1X5A CHEMOTHERAPY-INDUCED NEUROPATHY (H): ICD-10-CM

## 2020-03-27 DIAGNOSIS — M19.90 INFLAMMATORY ARTHRITIS: ICD-10-CM

## 2020-03-27 DIAGNOSIS — I26.99 OTHER ACUTE PULMONARY EMBOLISM WITHOUT ACUTE COR PULMONALE (H): ICD-10-CM

## 2020-03-27 DIAGNOSIS — C79.9 METASTASIS FROM GASTRIC CANCER (H): ICD-10-CM

## 2020-03-27 DIAGNOSIS — G62.0 CHEMOTHERAPY-INDUCED NEUROPATHY (H): ICD-10-CM

## 2020-03-27 DIAGNOSIS — C16.9 METASTASIS FROM GASTRIC CANCER (H): ICD-10-CM

## 2020-03-27 DIAGNOSIS — Z86.711 HX OF PULMONARY EMBOLUS: ICD-10-CM

## 2020-03-27 DIAGNOSIS — I26.99 ACUTE PULMONARY EMBOLISM (H): ICD-10-CM

## 2020-03-27 RX ORDER — GABAPENTIN 300 MG/1
CAPSULE ORAL
Qty: 450 CAPSULE | Refills: 1 | Status: SHIPPED | OUTPATIENT
Start: 2020-03-27 | End: 2020-05-18

## 2020-03-27 RX ORDER — WARFARIN SODIUM 5 MG/1
5 TABLET ORAL DAILY
Qty: 90 TABLET | Refills: 3 | Status: SHIPPED | OUTPATIENT
Start: 2020-03-27 | End: 2020-05-18

## 2020-03-27 RX ORDER — PREDNISONE 5 MG/1
5 TABLET ORAL DAILY
Qty: 90 TABLET | Refills: 3 | Status: SHIPPED | OUTPATIENT
Start: 2020-03-27 | End: 2020-05-18

## 2020-03-27 RX ORDER — ZOLPIDEM TARTRATE 10 MG/1
10 TABLET ORAL
Qty: 90 TABLET | Refills: 1 | Status: SHIPPED | OUTPATIENT
Start: 2020-03-27 | End: 2020-10-16

## 2020-03-27 NOTE — TELEPHONE ENCOUNTER
Received fax requesting new script for Zolpidem to [facility] mail order pharmacy. Last filled on 1/27/2020 for 90 with 1 refill to local [facility].    Also requesting gabapentin, prednisone, warfarin.

## 2020-03-30 ENCOUNTER — ANTICOAGULATION THERAPY VISIT (OUTPATIENT)
Dept: ANTICOAGULATION | Facility: CLINIC | Age: 60
End: 2020-03-30

## 2020-03-30 DIAGNOSIS — I26.99 OTHER ACUTE PULMONARY EMBOLISM WITHOUT ACUTE COR PULMONALE (H): ICD-10-CM

## 2020-03-30 DIAGNOSIS — Z86.711 HX OF PULMONARY EMBOLUS: ICD-10-CM

## 2020-03-30 DIAGNOSIS — I26.99 ACUTE PULMONARY EMBOLISM (H): ICD-10-CM

## 2020-03-30 LAB — INR PPP: 2.4 (ref 0.9–1.1)

## 2020-03-31 NOTE — PROGRESS NOTES
ANTICOAGULATION FOLLOW-UP CLINIC VISIT    Patient Name:  Reuben Padilla  Date:  3/30/2020  Contact Type:  Telephone    SUBJECTIVE:  Patient Findings         Clinical Outcomes     Negatives:   Major bleeding event, Thromboembolic event, Anticoagulation-related hospital admission, Anticoagulation-related ED visit, Anticoagulation-related fatality           OBJECTIVE    INR   Date Value Ref Range Status   2020 2.4 (A) 0.90 - 1.10 Final       ASSESSMENT / PLAN  INR assessment THER    Recheck INR In: 1 WEEK    INR Location Outside lab      Anticoagulation Summary  As of 3/30/2020    INR goal:   2.0-3.0   TTR:   74.0 % (1.6 wk)   INR used for dosin.4 (3/30/2020)   Warfarin maintenance plan:   5 mg (5 mg x 1) every day   Full warfarin instructions:   5 mg every day   Weekly warfarin total:   35 mg   Plan last modified:   Mukul Gonzalez RPH (3/26/2020)   Next INR check:   2020   Target end date:   Indefinite    Indications    Acute pulmonary embolism (H) [I26.99]  Hx of pulmonary embolus [Z86.711]  Other acute pulmonary embolism without acute cor pulmonale (H) [I26.99]             Anticoagulation Episode Summary     INR check location:       Preferred lab:       Send INR reminders to:   [facility]    Comments:   [facility] (P)[phone] (F)[phone], Takes Warfarin in the am      Anticoagulation Care Providers     Provider Role Specialty Phone number    Kadi Dee MD Referring Hematology [phone]            See the Encounter Report to view Anticoagulation Flowsheet and Dosing Calendar (Go to Encounters tab in chart review, and find the Anticoagulation Therapy Visit)    Spoke with patient. Gave them their lab results and new warfarin recommendation.  No changes in health, medication, or diet. No missed doses, no falls. No signs or symptoms of bleed or clotting.      Mukul Gonzalez RPH

## 2020-04-07 ENCOUNTER — ANTICOAGULATION THERAPY VISIT (OUTPATIENT)
Dept: ANTICOAGULATION | Facility: CLINIC | Age: 60
End: 2020-04-07

## 2020-04-07 ENCOUNTER — TRANSFERRED RECORDS (OUTPATIENT)
Dept: HEALTH INFORMATION MANAGEMENT | Facility: CLINIC | Age: 60
End: 2020-04-07

## 2020-04-07 DIAGNOSIS — I26.99 ACUTE PULMONARY EMBOLISM (H): ICD-10-CM

## 2020-04-07 DIAGNOSIS — Z86.711 HX OF PULMONARY EMBOLUS: ICD-10-CM

## 2020-04-07 DIAGNOSIS — M06.00 SERONEGATIVE RHEUMATOID ARTHRITIS (H): Primary | ICD-10-CM

## 2020-04-07 DIAGNOSIS — I26.99 OTHER ACUTE PULMONARY EMBOLISM WITHOUT ACUTE COR PULMONALE (H): ICD-10-CM

## 2020-04-07 LAB
ABS BASOPHILS: 0 CELLS/MM3 (ref 0–0.3)
ABS EOSINOPHILS: 0 CELLS/MM3 (ref 0–0.5)
ABS LYMPHOCYTES: 1.7 CELLS/MM3 (ref 0.9–3.6)
ABS MONOCYTE: 0.3 CELLS/MM3 (ref 0.3–0.9)
ABS NEUTROPHILS: 2.5 CELLS/MM3 (ref 1.7–8.5)
ALBUMIN SERPL-MCNC: 4 G/DL (ref 3.5–4.8)
ALBUMIN/GLOB SERPL: 1.5 {RATIO} (ref 1.1–2.2)
ALP SERPL-CCNC: 71 U/L (ref 40–150)
ALT SERPL-CCNC: 17 U/L (ref 0–55)
ANION GAP SERPL CALCULATED.3IONS-SCNC: 8 MMOL/L (ref 5–15)
AST SERPL-CCNC: 14 U/L (ref 5–34)
BASOPHILS NFR BLD AUTO: 0.2 % (ref 0–3)
BILIRUB SERPL-MCNC: 0.7 MG/DL (ref 0.3–1.2)
BUN SERPL-MCNC: 18 MG/DL (ref 9–25)
CALCIUM SERPL-MCNC: 9 MG/DL (ref 8.4–10.2)
CHLORIDE SERPLBLD-SCNC: 111 MMOL/L (ref 98–107)
CO2 SERPL-SCNC: 24 MMOL/L (ref 22–30)
CREAT SERPL-MCNC: 0.85 MG/DL (ref 0.7–1.3)
EOSINOPHIL NFR BLD AUTO: 0.9 % (ref 0–8)
ERYTHROCYTE [DISTWIDTH] IN BLOOD BY AUTOMATED COUNT: 14 % (ref 11–15)
GFR SERPL CREATININE-BSD FRML MDRD: >60 ML/MIN/1.73M2
GLOBULIN: 2.7 G/DL (ref 2.4–3.5)
GLUCOSE SERPL-MCNC: 99 MG/DL (ref 70–99)
HCT VFR BLD AUTO: 42.2 % (ref 37–51)
HEMOGLOBIN: 14.1 G/DL (ref 13–17)
INR PPP: 2.02 (ref 0.9–1.1)
LYMPHOCYTES NFR BLD AUTO: 37.8 % (ref 10–41)
MCH RBC QN AUTO: 29.8 PG (ref 27–34)
MCHC RBC AUTO-ENTMCNC: 33.4 G/DL (ref 32–35)
MCV RBC AUTO: 89.2 FL (ref 82–99)
MONOCYTES NFR BLD AUTO: 7 % (ref 4–15)
NEUTROPHILS NFR BLD AUTO: 54.1 % (ref 40–80)
OTHER: 0.92 (ref 0.71–1.85)
PLATELET # BLD AUTO: 142 10^9/L (ref 150–400)
PMV BLD: 10.4 FL (ref 6–11)
POTASSIUM SERPL-SCNC: 3.9 MMOL/L (ref 3.6–5)
PROT SERPL-MCNC: 6.7 G/DL (ref 6.3–8.2)
RBC # BLD AUTO: 4.73 10^12/L (ref 4.2–5.9)
SODIUM SERPL-SCNC: 143 MMOL/L (ref 136–145)
TSH SERPL-ACNC: 2.51 MCU/ML (ref 0.3–5)
WBC # BLD AUTO: 4.6 10^9/L (ref 3.4–10.7)

## 2020-04-07 NOTE — PROGRESS NOTES
ANTICOAGULATION FOLLOW-UP CLINIC VISIT    Patient Name:  Reuben Padilla  Date:  2020  Contact Type:  Telephone    SUBJECTIVE:  Patient Findings     Comments:   INR trending down.         Clinical Outcomes     Comments:   INR trending down.            OBJECTIVE    INR   Date Value Ref Range Status   2020 2.02 (A) 0.90 - 1.10 Final       ASSESSMENT / PLAN  No question data found.  Anticoagulation Summary  As of 2020    INR goal:   2.0-3.0   TTR:   85.0 % (2.7 wk)   INR used for dosin.02 (2020)   Warfarin maintenance plan:   7.5 mg (5 mg x 1.5) every Tue; 5 mg (5 mg x 1) all other days   Full warfarin instructions:   7.5 mg every Tue; 5 mg all other days   Weekly warfarin total:   37.5 mg   Plan last modified:   Carola Box RN (2020)   Next INR check:   2020   Target end date:   Indefinite    Indications    Acute pulmonary embolism (H) [I26.99]  Hx of pulmonary embolus [Z86.711]  Other acute pulmonary embolism without acute cor pulmonale (H) [I26.99]             Anticoagulation Episode Summary     INR check location:       Preferred lab:       Send INR reminders to:   [facility]    Comments:   [facility] (P)[phone] (F)[phone], Takes Warfarin in the am      Anticoagulation Care Providers     Provider Role Specialty Phone number    Kadi Dee MD Referring Hematology [phone]            See the Encounter Report to view Anticoagulation Flowsheet and Dosing Calendar (Go to Encounters tab in chart review, and find the Anticoagulation Therapy Visit)    Spoke with patient.     Carola Box RN

## 2020-04-07 NOTE — TELEPHONE ENCOUNTER
Pt is having increased pain and stiffness in his left shoulder.  It used to be worse in the right, but for some reason has switched to the left.  He continues to have increasing joint pain and stiffness.  He has resumed cancer treatment, and things have gotten worse with his joint pain.  He continues on 5 mg a day of prednisone. He is wondering what his options would be for treatment?    He is currently doing his treatments in [place], he is not coming here anytime soon.  He would like to make sure that Dr. Dee is included in any decision making.      He would be open to video or telephone visit if needed.    Sweetie Hernandez RN  Rheumatology Clinic

## 2020-04-09 RX ORDER — PREDNISONE 5 MG/1
TABLET ORAL
Qty: 50 TABLET | Refills: 0 | Status: SHIPPED | OUTPATIENT
Start: 2020-04-09 | End: 2020-06-29

## 2020-04-09 NOTE — TELEPHONE ENCOUNTER
Michelle Singer MD Beard, Sweetie, RN    Caller: Unspecified (Today,  3:25 PM)               Yes, if he does not want to  do the taper.      Called and spoke with pt he would prefer to do the taper, he understands to do  4tab=20 mg qd x 5 days, 3tab=15 mg qd x 5 days, 2tab=10 mg qd x 5 days, then keep on 1 tab=5 mg every day.    Will send prescription to [facility] in [place].    Sweetie Henrandez RN  Rheumatology Clinic

## 2020-04-09 NOTE — TELEPHONE ENCOUNTER
Called and spoke with pt, he will see Dr. Singer on 4/17.      He is having increasing pain in hands.  He is wondering if he can increase his prednisone to 10 mg a day to see if that helps?    Sweetie Hernandez RN  Rheumatology Clinic

## 2020-04-13 ENCOUNTER — PATIENT OUTREACH (OUTPATIENT)
Dept: ONCOLOGY | Facility: CLINIC | Age: 60
End: 2020-04-13

## 2020-04-13 ENCOUNTER — ANTICOAGULATION THERAPY VISIT (OUTPATIENT)
Dept: ANTICOAGULATION | Facility: CLINIC | Age: 60
End: 2020-04-13

## 2020-04-13 DIAGNOSIS — Z86.711 HX OF PULMONARY EMBOLUS: ICD-10-CM

## 2020-04-13 DIAGNOSIS — I26.99 OTHER ACUTE PULMONARY EMBOLISM WITHOUT ACUTE COR PULMONALE (H): ICD-10-CM

## 2020-04-13 DIAGNOSIS — I26.99 ACUTE PULMONARY EMBOLISM (H): ICD-10-CM

## 2020-04-13 LAB — INR PPP: 2.1 (ref 0.9–1.1)

## 2020-04-13 NOTE — PROGRESS NOTES
ANTICOAGULATION FOLLOW-UP CLINIC VISIT    Patient Name:  Reuben Padilla  Date:  2020  Contact Type:  Telephone    SUBJECTIVE:  Patient Findings     Positives:   Change in medications (On prednsione for rheumatoid arthritis.  Currently on 20mg until Wed, then 15mg X 5 days, then 10mg  X5 days.)             OBJECTIVE    INR   Date Value Ref Range Status   2020 2.1 (A) 0.90 - 1.10 Final       ASSESSMENT / PLAN  INR assessment THER    Recheck INR In: 2 WEEKS    INR Location Outside lab      Anticoagulation Summary  As of 2020    INR goal:   2.0-3.0   TTR:   88.6 % (3.6 wk)   INR used for dosin.1 (2020)   Warfarin maintenance plan:   7.5 mg (5 mg x 1.5) every Tue; 5 mg (5 mg x 1) all other days   Full warfarin instructions:   7.5 mg every Tue; 5 mg all other days   Weekly warfarin total:   37.5 mg   Plan last modified:   Carola Box RN (2020)   Next INR check:   2020   Target end date:   Indefinite    Indications    Acute pulmonary embolism (H) [I26.99]  Hx of pulmonary embolus [Z86.711]  Other acute pulmonary embolism without acute cor pulmonale (H) [I26.99]             Anticoagulation Episode Summary     INR check location:       Preferred lab:       Send INR reminders to:   [facility]    Comments:   [facility] (P)[phone] (F)[phone], Takes Warfarin in the am      Anticoagulation Care Providers     Provider Role Specialty Phone number    Kadi Dee MD Referring Hematology [phone]            See the Encounter Report to view Anticoagulation Flowsheet and Dosing Calendar (Go to Encounters tab in chart review, and find the Anticoagulation Therapy Visit)    Spoke with patient.    Alley Alarcon RN

## 2020-04-17 ENCOUNTER — VIRTUAL VISIT (OUTPATIENT)
Dept: RHEUMATOLOGY | Facility: CLINIC | Age: 60
End: 2020-04-17
Attending: INTERNAL MEDICINE
Payer: COMMERCIAL

## 2020-04-17 DIAGNOSIS — M19.90 INFLAMMATORY ARTHRITIS: Primary | ICD-10-CM

## 2020-04-17 DIAGNOSIS — M06.00 SERONEGATIVE RHEUMATOID ARTHRITIS (H): ICD-10-CM

## 2020-04-17 ASSESSMENT — PAIN SCALES - GENERAL: PAINLEVEL: MODERATE PAIN (4)

## 2020-04-17 NOTE — PROGRESS NOTES
"Reuben Padilla is a 59 year old male who is being evaluated via a billable video visit.  This was changed to phone visit as video did not work for the patient.    The patient has been notified of following:     \"This video visit will be conducted via a call between you and your physician/provider. We have found that certain health care needs can be provided without the need for an in-person physical exam.  This service lets us provide the care you need with a video conversation.  If a prescription is necessary we can send it directly to your pharmacy.  If lab work is needed we can place an order for that and you can then stop by our lab to have the test done at a later time.    Video visits are billed at different rates depending on your insurance coverage.  Please reach out to your insurance provider with any questions.    If during the course of the call the physician/provider feels a video visit is not appropriate, you will not be charged for this service.\"    Patient has given verbal consent for Video visit? Yes (now phone visit)    How would you like to obtain your AVS? Bertin    Patient would like the video invitation sent by: Send to e-mail at: [email or web address]        Additional provider notes: See my note      Phone visit duration:  20 min    Michelle Singer MD    "

## 2020-04-17 NOTE — MR AVS SNAPSHOT
After Visit Summary   11/28/2018    Reuben Padilla    MRN: [id number]           Patient Information     Date Of Birth          1960        Visit Information        Provider Department      11/28/2018 3:20 PM oRsy Fierro APRN [facility]        Today's Diagnoses     Malignant neoplasm of esophagus, unspecified location (H)    -  1       Follow-ups after your visit        Follow-up notes from your care team     Return if symptoms worsen or fail to improve.      Your next 10 appointments already scheduled     Dec 03, 2018  8:00 AM CST   [facility] with [facility]   [facility] Lab Draw ([facility])    [address] [place] [address]  [address]  [facility] [id number] [phone]            Dec 03, 2018  8:30 AM CST   (Arrive by 8:15 AM)   Return Visit with Kadi Dee MD   [facility] ([facility])    [address] [place] [address]  [address]  [facility] [address] [phone]            Dec 03, 2018 10:00 AM CST   Infusion 60 with  ONCOLOGY INFUSION, UC 29 ATC   [facility] ([facility])    [address] [place] [address]  [address]  [facility] [address] [phone]              Who to contact     If you have questions or need follow up information about today's clinic visit or your schedule please contact [facility] directly at [phone].  Normal or non-critical lab and imaging results will be communicated to you by MyChart, letter or phone within 4 business days after the clinic has received the results. If you do not hear from us within 7 days, please contact the clinic through MyChart or phone. If you have a critical or abnormal lab result, we will notify you by phone as soon as possible.  Submit refill requests through [email or web address] or call your pharmacy and they will forward the refill request to us. Please allow 3  Repeat labs should be done about 8 weeks from the change of dose  So technically middle of May to late May she could repeat the tests  TSH and free T4   "business days for your refill to be completed.          Additional Information About Your Visit        MyChart Information     [employer] gives you secure access to your electronic health record. If you see a primary care provider, you can also send messages to your care team and make appointments. If you have questions, please call your primary care clinic.  If you do not have a primary care provider, please call [phone] and they will assist you.        Care EveryWhere ID     This is your Care EveryWhere ID. This could be used by other organizations to access your [facility] medical records  [id number]        Your Vitals Were     Pulse Temperature Respirations Height Pulse Oximetry BMI (Body Mass Index)    93 98.1  F (36.7  C) (Oral) 18 1.981 m (6' 5.99\") 97% 38.09 kg/m2       Blood Pressure from Last 3 Encounters:   11/28/18 133/90   11/20/18 128/85   11/13/18 121/78    Weight from Last 3 Encounters:   11/28/18 149.5 kg (329 lb 8 oz)   11/20/18 145.6 kg (321 lb)   11/13/18 (!) 154.2 kg (340 lb)              Today, you had the following     No orders found for display       Primary Care Provider Fax #    Physician No Ref-Primary [phone]       No address on file        Equal Access to Services     OLLIE SHARPE : Hadii antonio navarroo Sojose antonioali, waaxda luqadaha, qaybta kaalmada adeegyada, geraldo [place] . So [facility] [phone].    ATENCIÓN: Si habla español, tiene a alamo disposición servicios gratuitos de asistencia lingüística. Llame al [phone].    We comply with applicable federal civil rights laws and [place] laws. We do not discriminate on the basis of race, color, national origin, age, disability, sex, sexual orientation, or gender identity.            Thank you!     Thank you for choosing [facility]  for your care. Our goal is always to provide you with excellent care. Hearing back from our patients is one way we can continue to improve our services. Please " take a few minutes to complete the written survey that you may receive in the mail after your visit with us. Thank you!             Your Updated Medication List - Protect others around you: Learn how to safely use, store and throw away your medicines at www.disposemymeds.org.          This list is accurate as of 11/28/18 11:59 PM.  Always use your most recent med list.                   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)       diphenoxylate-atropine 2.5-0.025 MG tablet    LOMOTIL    40 tablet    Take 1 tablet by mouth 4 times daily as needed for diarrhea    Diarrhea, unspecified type       * enoxaparin 150 MG/ML syringe    LOVENOX     INJECT 0.86ML (130MG) TWICE A DAY FOR ACUTE PULMONARY EMBOLISM AND ESOPHAGEAL MALIGNANCY        * enoxaparin 100 MG/ML syringe    LOVENOX    42 mL    Inject 1 mL (100 mg) Subcutaneous every 12 hours    Cancer of distal third of esophagus (H)       gabapentin 300 MG capsule    NEURONTIN    180 capsule    2 tablets in the am and 3 tablets before bed    Chemotherapy-induced neuropathy (H)       latanoprost 0.005 % ophthalmic solution    XALATAN    1 Bottle    Place 1 drop into both eyes At Bedtime    Cancer of distal third of esophagus (H)       lidocaine 5 % patch    LIDODERM    30 patch    Apply up to 3 patches to painful area at once for up to 12 h within a 24 h period.  Remove after 12 hours.    LLQ abdominal pain, Cancer of distal third of esophagus (H), Pain around percutaneous endoscopic gastrostomy (PEG) tube site, sequela       lidocaine-prilocaine 2.5-2.5 % external cream    EMLA    30 g    Apply topically as needed for moderate pain    Cancer of distal third of esophagus (H)       LORazepam 0.5 MG tablet    ATIVAN    30 tablet    Take 1 tablet (0.5 mg) by mouth every 4 hours as needed  (Anxiety, Nausea/Vomiting or Sleep)    Cancer of distal third of esophagus (H)       metoclopramide 10 MG tablet    REGLAN    120 tablet    Take 1 tablet (10 mg) by mouth 3 times daily    Gastroparesis       Multi-vitamin tablet      Take 1 tablet by mouth daily        omeprazole 40 MG DR capsule    priLOSEC    90 capsule    Take 1 capsule (40 mg) by mouth daily    Cancer of distal third of esophagus (H)       ondansetron 8 MG tablet    ZOFRAN    30 tablet    Take 1 tablet (8 mg) by mouth every 8 hours as needed (Nausea/Vomiting)    Cancer of distal third of esophagus (H)       oxyCODONE IR 10 MG tablet    ROXICODONE    20 tablet    Take 1 tablet (10 mg) by mouth every 3 hours as needed for breakthrough pain    Cancer of distal third of esophagus (H)       prochlorperazine 10 MG tablet    COMPAZINE    30 tablet    Take 1 tablet (10 mg) by mouth every 6 hours as needed (Nausea/Vomiting)    Cancer of distal third of esophagus (H)       protein modular Liqd     180 packet    2 packets by Per Feeding Tube route 3 times daily    Cancer of distal third of esophagus (H)       timolol maleate 0.5 % ophthalmic solution    TIMOPTIC     Place into both eyes daily    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 Read the directions carefully, and ask your doctor or other care provider to review them with you.

## 2020-04-17 NOTE — PROGRESS NOTES
Rheumatology Virtual Visit Note    Reason for visit: F/U inflammatory arthritis sec check point inhibitor immunotherapy      Initial visit date: 5/29/2019    Last seen: 7/22/2019    DOS: 4/17/2020 (this is a phone visit due to COVID-19 outbreak), patient agreed          HPI:    Reuben Padilla is a 58 year old  male who was referred to our clinic for evaluation and management of his check point inhibitor induced inflammatory arthritis.    Got diagnosed with metastatic esophageal cancer (mets in the liver) in 2017. He was put on one of check point inhibitors Keytruda (PD1 overexpressor) in 10/2018. Developed grade 3 musculoskeletal toxicity from immunotherapy. In April 2019, his infusion was held for three weeks, and he was treated with prednisone for grade 3 arthralgias. It involved hands, R shoulder and R arm pain.  Had pain, stiffness and poor , no joint swelling. He came off the drug, was treated with prednsione 80 mg max every day taper which helped. He is off prednisone now and back to keytruda (s/p one infusion) as was very effective for the cancer. He already started to notice recurrence of arthralgia and is afraid that it gets worse over course of TX which is essential to keep his cancer under excellent control. He gets the drug every 3 weeks.    Today, has h/o shoulder ache, but no hand arthritis.    No personal hx of psoraisis or fh/o psoriasis. There is fh/o OA. No fh/o RA, autoimmune disorders. Has a 26 yo son. 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      Has neuropathy in his legs from mid shin to ankles like numbness.      Has dry mouth during sleep. Gained weight on prednisone.     7/22/2019: Levi was seen at [facility] during Keytruda infusion. He had an oncology visit today, plan is to re-scan in Aug 2019. Doing very well, has no complaints. Is on Enbrel s/p 4 inj, no SEs. Started tapering his prednisone from 20 mg every day, now on 17.5 mg every day since  7/20/2019. No joint pain (onlt 1/10 pain if he tries hard to make a full fist) or swelling or stiffness.       Today 4/17/2020: S/p enbrel 6-9/2019 (failed), humira 10/2019-1/2020 (failed) then actemra was finally approved by insurance but given cancer progression on CT, his oncologist recomemnded holding immunosuppressive drugs and manage arthritis with low dose prednisone. He started having severe L shoulder pain unresponsive to 5 mg of prednisone. I gave him a prednsione taper: 20-15-10  mg every day each for 5 days then 5 mg every day. He is now on 15 mg every day, has 2 calvo days left on it. Prednisone at 20 mg every day took away lots of his pain (except hand pain) but then it's coming back joseph L shoulder pain, as he tapered to 15 mg every day.     Pain and stiffness in hands did not resolve on 20 mg every day, got slightly better. AM stiffness is 2 hours.     Reports his cancer to be stable, next scan is scheduled for 4/18/2020. Has no complaints regarding cancer and no other SE of cancer tx except joint pain.        ROS:  A comprehensive ROS was done, positives are per HPI.        Past Medical History:   Diagnosis Date     Arrhythmia      Cancer (H)     esophageal     Glaucoma      Hypertension      Paroxysmal A-fib (H)      Tubular adenoma 02/2017     Past Surgical History:   Procedure Laterality Date     AMPUTATION      toe     ARTHROSCOPY KNEE       bunion surgery       CHOLECYSTECTOMY       COLONOSCOPY  02/2017    remove 2 polyps     ESOPHAGOSCOPY, GASTROSCOPY, DUODENOSCOPY (EGD), COMBINED N/A 10/10/2018    Procedure: COMBINED ESOPHAGOSCOPY, GASTROSCOPY, DUODENOSCOPY (EGD);  Esophagogastroduodenoscopy ;  Surgeon: Leonel Joel MD;  Location: [facility] OR     INSERT PORT VASCULAR ACCESS Right 5/9/2017    Procedure: INSERT PORT VASCULAR ACCESS;  Single Lumen Chest Power Port;  Surgeon: Jasiel Hu PA-C;  Location: [facility] OR     FHx: No fhx of RA  Social History     Socioeconomic History      Marital status: Single     Spouse name: Not on file     Number of children: Not on file     Years of education: Not on file     Highest education level: Not on file   Occupational History     Not on file   Social Needs     Financial resource strain: Not on file     Food insecurity     Worry: Not on file     Inability: Not on file     Transportation needs     Medical: Not on file     Non-medical: Not on file   Tobacco Use     Smoking status: Former Smoker     Packs/day: 1.00     Years: 20.00     Pack years: 20.00     Types: Cigarettes     Last attempt to quit: 2006     Years since quittin.3     Smokeless tobacco: Never Used   Substance and Sexual Activity     Alcohol use: Yes     Comment: occasional     Drug use: No     Comment: h/o over 30 years ago     Sexual activity: Not on file   Lifestyle     Physical activity     Days per week: Not on file     Minutes per session: Not on file     Stress: Not on file   Relationships     Social connections     Talks on phone: Not on file     Gets together: Not on file     Attends [employer] service: Not on file     Active member of club or organization: Not on file     Attends meetings of clubs or organizations: Not on file     Relationship status: Not on file     Intimate partner violence     Fear of current or ex partner: Not on file     Emotionally abused: Not on file     Physically abused: Not on file     Forced sexual activity: Not on file   Other Topics Concern     Parent/sibling w/ CABG, MI or angioplasty before 65F 55M? Not Asked   Social History Narrative     Not on file     Patient Active Problem List   Diagnosis     Cancer of distal third of esophagus (H)     Acute pulmonary embolism (H)     Tear of right supraspinatus tendon     Examination of participant in clinical trial     Esophageal obstruction     Paroxysmal A-fib (H)     Orthostatic hypotension     Chemotherapy-induced neutropenia (H)     G tube feedings (H)     Varicose veins of both lower extremities  with complications     Traumatic closed fracture of thoracic vertebra with minimal displacement (H)     Malignant neoplasm metastatic to liver (H)     Impotence of organic origin     Edema     Congenital stenosis of esophagus     Hx of pulmonary embolus     Other acute pulmonary embolism without acute cor pulmonale (H)     Allergies   Allergen Reactions     Penicillin G        Outpatient Encounter Medications as of 4/17/2020   Medication Sig Dispense Refill     diphenoxylate-atropine 2.5-0.025 MG PO tablet Take 1-2 tablets by mouth 4 times daily as needed for diarrhea (Max of 8 pills in 24 hours) 100 tablet 1     gabapentin (NEURONTIN) 300 MG capsule 2 tablets in the am and 3 tablets before bed 450 capsule 1     latanoprost (XALATAN) 0.005 % ophthalmic solution Place 1 drop into both eyes At Bedtime 1 Bottle 0     lidocaine-prilocaine (EMLA) 2.5-2.5 % external cream Apply topically as needed for moderate pain 30 g 3     multivitamin, therapeutic with minerals (MULTI-VITAMIN) TABS tablet Take 1 tablet by mouth daily       oxyCODONE IR 10 MG PO tablet Take 1 tablet (10 mg) by mouth every 4 hours as needed for severe pain 1-2 tablets every 4-6hrs for pain. 60 tablet 0     predniSONE (DELTASONE) 5 MG tablet Take 4tab=20 mg qd x 5 days, 3tab=15 mg qd x 5 days, 2tab=10 mg qd x 5 days, then keep on 1 tab=5 mg qd 50 tablet 0     predniSONE (DELTASONE) 5 MG tablet Take 1 tablet (5 mg) by mouth daily 90 tablet 3     timolol (TIMOPTIC) 0.5 % ophthalmic solution Place into both eyes daily       warfarin ANTICOAGULANT (COUMADIN) 5 MG tablet Take 1 tablet (5 mg) by mouth daily 90 tablet 3     zolpidem (AMBIEN) 10 MG tablet Take 1 tablet (10 mg) by mouth nightly as needed 90 tablet 1     Facility-Administered Encounter Medications as of 4/17/2020   Medication Dose Route Frequency Provider Last Rate Last Dose     sod bicarbonate-citric acid-simethicone (EZ GAS) 2.21-1.53-0.04 g packet 4 g  4 g Oral Once Justin Yao MD                    His records were reviewed.    Component      Latest Ref Rng & Units 5/29/2019   RNP Antibody IgG      0.0 - 0.9 AI 0.3   Ralph KELTON Antibody IgG      0.0 - 0.9 AI <0.2   SSA (Ro) (KELTON) Antibody, IgG      0.0 - 0.9 AI <0.2   SSB (La) (KELTON) Antibody, IgG      0.0 - 0.9 AI <0.2   Scleroderma Antibody Scl-70 KELTON IgG      0.0 - 0.9 AI <0.2   Quantiferon-TB Gold Plus Result      NEG:Negative Negative   TB1 Ag minus Nil Value      IU/mL 0.00   TB2 Ag minus Nil Value      IU/mL 0.00   Mitogen minus Nil Result      IU/mL 9.13   Nil Result      IU/mL 0.03   EDELMIRA interpretation      NEG:Negative Positive (A)   EDELMIRA pattern 1       SPECKLED   EDELMIRA titer 1       1:320   Beta 2 Glycoprotein 1 Antibody IgG      <7 U/mL 0.8   Beta 2 Glycoprotein 1 Antibody IgM      <7 U/mL <0.9   Cardiolipin Antibody IgG      0.0 - 19.9 GPL-U/mL <1.6   Cardiolipin Antibody IgM      0.0 - 19.9 MPL-U/mL 0.9   Beta 2 Glycoprotein 1 Antibody IgA      <7 U/mL 4.0   Cardiolipin Antibody IgA      0.0 - 19.9 APL U/mL 1.3   Lupus Result      NEG:Negative Negative   Rheumatoid Factor      <20 IU/mL <20   Cyclic Citrullinated Peptide Antibody, IgG      <7 U/mL 1   DNA-ds      <10 IU/mL <1   CRP Inflammation      0.0 - 8.0 mg/L 4.8   Sed Rate      0 - 20 mm/h 14       EXAMINATION: CT CHEST/ABDOMEN/PELVIS W CONTRAST  2/20/2020 8:27 AM       CLINICAL HISTORY: metastatic esophageal CA, has been on Keytruda with  good response. Last Ct showed mild progression; Cancer of distal third  of esophagus.     COMPARISON: CT 12/16/2019         PROCEDURE COMMENTS: CT of the chest, abdomen, and pelvis was performed  with intravenous contrast. Axial MIP images of the chest, and coronal  and sagittal reformatted images of the chest, abdomen, and pelvis  obtained.     FINDINGS:       Support devices: Right chest wall Port-A-Cath, with tip at the  superior cavoatrial junction.     Chest:     Visualized portions of the thyroid gland are unremarkable in  appearance.  The heart is normal in size, without pericardial effusion.  The thoracic vessels are normal in caliber and configuration. No  pathologically enlarged thoracic lymph nodes. Scattered mediastinal  lymph nodes measuring up to 9 mm in short axis dimension and appear  unchanged from prior examination. Small sliding-type hiatal hernia.     The central tracheobronchial tree is patent. No pneumothorax or  pleural effusion. Mild dependent atelectatic changes, similar to prior  examination. No focal airspace consolidation. No significant  emphysematous or reticular change. Scattered solid pulmonary nodules  measuring up to 4 mm are not significantly changed in size, including  the 4 mm nodule in the left lower lobe which demonstrated increased  size on the prior examination (series 6, image 134). No new or  enlarging pulmonary nodule is identified.     Abdomen/pelvis:     There is moderate thickening of the distal esophagus. Previously  described infiltrative mass near the gastroesophageal junction appears  decreased in size from prior examination, measuring 4.3 x 3.0 cm,  previously 4.8 x 3.4 cm when measured in similar fashion. There is  continued mild haziness about the mass. The mass continues to  partially encase the celiac trunk, and portions of the splenic and  common hepatic arteries. The mass continues to abut the pancreas  without definite evidence of infiltration.     Homogenous enhancement of the hepatic parenchyma, without evidence of  focal liver lesion. The portal vein appears patent. The gallbladder is  surgically absent. No intra or extrahepatic biliary dilatation.  Moderate fatty atrophy of the pancreatic parenchyma, without discrete  pancreatic lesion or ductal dilatation. Enlargement of the spleen,  measuring 17.8 cm in craniocaudal dimension. Stable soft tissue nodule  adjacent to the anterior tip of the spleen, compatible with splenosis.  Unchanged bilateral adrenal myelolipomas. The adrenal glands  are  otherwise unremarkable. Symmetric nephrographic enhancement of the  kidneys, without hydronephrosis. Few subcentimeter cortical  hypodensities within the left kidney, which are too small to  characterize, but likely represent simple cysts. The urinary bladder  is decompressed. Central prostatic calcifications. Symmetric seminal  vesicles.     No abnormally dilated or thickened loops of small or large bowel.  Stable prominence of the vermiform appendix, measuring 11 mm in  diameter, without adjacent inflammatory change to suggest acute  appendicitis. No pneumatosis or portal venous gas. No intraperitoneal  free fluid or free air. The major abdominal vasculature appears  patent, with encasement of the celiac axis as detailed above. No  infrarenal abdominal aortic aneurysm. Subcentimeter retroperitoneal  and paraceliac lymph nodes, which are not enlarged by pathologic  criteria. No overtly suspicious lymphadenopathy within the abdomen or  pelvis.     Bones and soft tissues:      No acute or aggressive osseous lesion. Multilevel degenerative changes  of the thoracolumbar spine, similar to prior. Unchanged compression  deformities in the lower thoracic spine from T9 through T12, as well  as at L2 and L5. Schmorl's node deformity at L1. Multilevel loss of  intervertebral disc space, with intrameniscal gas. Sclerotic density  within the left iliac wing is unchanged from prior examination,  compatible with bone islands. Injection granulomas throughout the  anterior abdominal subcutaneous fat.                                                                      IMPRESSION:  In this patient with history of esophageal cancer:  1. Mild interval decrease in size of the ill-defined soft tissue mass  near the gastroesophageal junction, with continued encasement of the  celiac artery proximal branches.  2. Stable scattered sub-6 mm solid pulmonary nodules. No new or  enlarging pulmonary nodule, including the 4 mm pulmonary  nodule in the  left lower lobe, which demonstrated increased size on the prior  examination.  3. No overtly suspicious lymphadenopathy within the chest, abdomen or  pelvis. Stable nonenlarged mediastinal lymph nodes.  4. Stable incidental findings as detailed in the body of the report.     I have personally reviewed the examination and initial interpretation  and I agree with the findings.     KENNEY MOISE MD    Component      Latest Ref Rng & Units 4/7/2020   WBC      3.4 - 10.7 10:9/L 4.6   RBC Count      4.2 - 5.9 10:12/L 4.73   Hemoglobin      13 - 17 g/dL 14.1   Hematocrit      37 - 51 % 42.2   MCV      82 - 99 fl 89.2   MCH      27 - 34 pg 29.8   MCHC      32 - 35 g/dL 33.4   RDW      11 - 15 % 14   Platelet Count      150 - 400 10:9/L 142 (A)   MPV      6 - 11 10.4   % Neutrophils      40 - 80 % 54.1   % Lymphocytes      10 - 41 % 37.8   % Monocytes      4 - 15 % 7   % Eosinophils      0 - 8 % 0.9   % Basophils      0 - 3 % 0.2   Abs Neutrophils      1.7 - 8.5 cells/mm3 2.5   Abs Lymphocytes      0.9 - 3.6 cells/mm3 1.7   Abs Monocytes      0.3 - 0.9 cells/mm3 0.3   Abs Eosinophils      0 - 0.5 cells/mm3 0   Abs Basophils      0 - 0.3 cells/mm3 0   Sodium      136 - 145 mmol/L 143   Potassium      3.6 - 5 mmol/L 3.9   Chloride      98 - 107 mmol/L 111 (A)   Carbon Dioxide      22 - 30 mmol/L 24   Anion Gap      5 - 15 mmol/L 8   Glucose      70 - 99 mg/dL 99   Urea Nitrogen      9 - 25 mg/dL 18   Creatinine      0.7 - 1.3 mg/dL 0.85   Calcium      8.4 - 10.2 mg/dL 9   Protein Total      6.3 - 8.2 g/dL 6.7   Albumin      3.5 - 4.8 g/dL 4   Bilirubin Total      0.3 - 1.2 mg/dL 0.7   Alkaline Phosphatase      40 - 150 U/L 71   AST      5 - 34 U/L 14   ALT      0 - 55 U/L 17   GFR Estimate      ml/min/1.73m2 >60   Globulin      2.4 - 3.5 g/dL 2.7   A/G Ratio      1.1 - 2.2 1.5   TSH      0.3 - 5 mcU/mL 2.51   Other      0.71 - 1.85 0.92     Ph.E:    Not done, phone visit    Assessment/ plan:    Inflammatory  arthritis included by check point inhibitor Tx/seroneg RA. Positive EDELMIRA could be due to malignancy, no other signs of CTD. Discussed Dx and Tx. Tried to start actemra as steroid sparing agent of choice per current data but unfortunately his insurance declined coverage, so we started him on enbrel (anti-TNF).    S/p enbrel 6-9/2019 (failed), humira 10/2019-1/2020 (failed) then actemra was finally approved by insurance but given cancer progression on CT, his oncologist recomemnded holding immunosuppressive drugs and manage arthritis with low dose prednisone. He started having severe L shoulder pain unresponsive to 5 mg of prednisone. I gave him a prednsione taper: 20-15-10  mg every day each for 5 days then 5 mg every day (Dr. Dee his oncologist was ok with this taper). He is now on 15 mg every day, has 2 calvo days left on it. Prednisone at 20 mg every day took away lots of his pain (except hand pain) but then it's coming back joseph L shoulder pain, as he tapered to 15 mg every day.     I discussed trying actemra as steroid sparing agent as patient can't tolerate arthralgia with prednisone below 20 mg every day. Dr. Dee  Agreed to try actemra. I called Mr. Padilla back and said that Dr. Dee is ok proceeding with actemra. Mr. Padilla wishes to try actemra given quality of life and severe joint pain as a SE of keytruda. I discussed risks and benefits of actemra. He is going to be monitored closely with serial CT for progression of cancer. He informed me that  His insurance since we got approval of actemra has changed to medicare. We have to see if the new insurance covers actemra or not. He wants to taper down prednisone. He has pain med prescribed by Dr. Dee to take to ease the pain when it's too bad.    Today's plan:      Finish prednisone taper:  4tab=20 mg qd x 5 days, 3tab=15 mg qd x 5 days, 2tab=10 mg qd x 5 days,  then 1 tab=5 mg qd     Return in 3-4 months      Start time: 2:50 pm   Stop time:  3:10  pm    Phone visit duration: 20  minutes

## 2020-04-17 NOTE — PATIENT INSTRUCTIONS
Will ask Dr. Dee about actemra    Finish prednisone taper:  4tab=20 mg qd x 5 days, 3tab=15 mg qd x 5 days, 2tab=10 mg qd x 5 days,  then 1 tab=5 mg qd     Return in 3-4 months

## 2020-04-21 ENCOUNTER — TELEPHONE (OUTPATIENT)
Dept: RHEUMATOLOGY | Facility: CLINIC | Age: 60
End: 2020-04-21

## 2020-04-21 NOTE — TELEPHONE ENCOUNTER
Free Drug Application Initiated  Medication - ACTEMRA  Sponsor - [employer]  Phone #- [phone]  Fax # - [phone]  Additional Information  FAXED INFO, AWAITING DETERMINATION

## 2020-04-27 ENCOUNTER — TRANSFERRED RECORDS (OUTPATIENT)
Dept: HEALTH INFORMATION MANAGEMENT | Facility: CLINIC | Age: 60
End: 2020-04-27

## 2020-04-27 ENCOUNTER — MYC MEDICAL ADVICE (OUTPATIENT)
Dept: RHEUMATOLOGY | Facility: CLINIC | Age: 60
End: 2020-04-27

## 2020-04-27 ENCOUNTER — ANTICOAGULATION THERAPY VISIT (OUTPATIENT)
Dept: ANTICOAGULATION | Facility: CLINIC | Age: 60
End: 2020-04-27

## 2020-04-27 ENCOUNTER — VIRTUAL VISIT (OUTPATIENT)
Dept: ONCOLOGY | Facility: CLINIC | Age: 60
End: 2020-04-27
Attending: INTERNAL MEDICINE
Payer: MEDICARE

## 2020-04-27 DIAGNOSIS — M06.00 SERONEGATIVE RHEUMATOID ARTHRITIS (H): Primary | ICD-10-CM

## 2020-04-27 DIAGNOSIS — Z86.711 HX OF PULMONARY EMBOLUS: ICD-10-CM

## 2020-04-27 DIAGNOSIS — Z86.711 HISTORY OF PULMONARY EMBOLISM: ICD-10-CM

## 2020-04-27 DIAGNOSIS — M19.90 INFLAMMATORY ARTHRITIS: ICD-10-CM

## 2020-04-27 DIAGNOSIS — C15.5 CANCER OF DISTAL THIRD OF ESOPHAGUS (H): Primary | ICD-10-CM

## 2020-04-27 DIAGNOSIS — I26.99 ACUTE PULMONARY EMBOLISM (H): ICD-10-CM

## 2020-04-27 DIAGNOSIS — I26.99 OTHER ACUTE PULMONARY EMBOLISM WITHOUT ACUTE COR PULMONALE (H): ICD-10-CM

## 2020-04-27 LAB
ALBUMIN SERPL-MCNC: 4.1 G/DL (ref 3.5–4.8)
ALBUMIN/GLOB SERPL: 1.4 {RATIO} (ref 1.1–2.2)
ALP SERPL-CCNC: 76 U/L (ref 40–150)
ALT SERPL-CCNC: 19 U/L (ref 0–55)
ANION GAP SERPL CALCULATED.3IONS-SCNC: 9 MMOL/L (ref 5–15)
AST SERPL-CCNC: 17 U/L (ref 5–34)
BASOPHILS # BLD AUTO: 0 10*3/UL (ref 0–0.3)
BASOPHILS NFR BLD AUTO: 0.4 % (ref 0–3)
BILIRUB SERPL-MCNC: 0.6 MG/DL (ref 0.3–1.2)
BUN SERPL-MCNC: 23 MG/DL (ref 9–25)
CALCIUM SERPL-MCNC: 9.3 MG/DL (ref 8.4–10.2)
CHLORIDE SERPLBLD-SCNC: 113 MMOL/L (ref 98–107)
CO2 SERPL-SCNC: 23 MMOL/L (ref 22–30)
CREAT SERPL-MCNC: 0.97 MG/DL (ref 0.7–1.3)
EOSINOPHIL # BLD AUTO: 0.1 10*3/UL (ref 0–0.5)
EOSINOPHIL NFR BLD AUTO: 0.7 % (ref 0–8)
ERYTHROCYTE [DISTWIDTH] IN BLOOD BY AUTOMATED COUNT: 15 % (ref 11–15)
GFR SERPL CREATININE-BSD FRML MDRD: >60 ML/MIN/1.7
GLOBULIN, CALCULATED - QUEST: 3 (ref 2.4–3.5)
GLUCOSE SERPL-MCNC: 106 MG/DL (ref 70–99)
HCT VFR BLD AUTO: 44.4 % (ref 37–51)
HEMOGLOBIN: 14.9 G/DL (ref 13–17)
INR PPP: 2.35 (ref 0.8–1.2)
LYMPHOCYTES # BLD AUTO: 2.4 10*3/UL (ref 0.9–3.6)
LYMPHOCYTES NFR BLD AUTO: 33.8 % (ref 10–41)
Lab: 1.9 NG/DL (ref 0.71–1.85)
MCH RBC QN AUTO: 29.9 PG (ref 27–34)
MCHC RBC AUTO-ENTMCNC: 33.6 G/DL (ref 32–35)
MCV RBC AUTO: 89 FL (ref 82–99)
MONOCYTES # BLD AUTO: 0.4 10*3/UL (ref 0.3–0.9)
MONOCYTES NFR BLD AUTO: 5.9 % (ref 4–15)
NEUTROPHILS # BLD AUTO: 4.1 10*3/UL (ref 1.7–8.5)
NEUTROPHILS NFR BLD AUTO: 59.2 % (ref 40–80)
PLATELET # BLD AUTO: 160 10^9/L (ref 150–400)
PMV BLD: 10.1 FL (ref 6–11)
POTASSIUM SERPL-SCNC: 4.1 MMOL/L (ref 3.6–5)
PROT SERPL-MCNC: 7.1 G/DL (ref 6.3–8.2)
PT BLD: 25.8 SECONDS (ref 12.1–14.5)
RBC # BLD AUTO: 4.99 10^12/L (ref 4.2–5.9)
SODIUM SERPL-SCNC: 145 MMOL/L (ref 136–145)
TSH SERPL-ACNC: 3.13 MCU/ML (ref 0–30)
WBC # BLD AUTO: 7 10^9/L (ref 3.4–10.7)

## 2020-04-27 PROCEDURE — 40000114 ZZH STATISTIC NO CHARGE CLINIC VISIT

## 2020-04-27 PROCEDURE — 99214 OFFICE O/P EST MOD 30 MIN: CPT | Mod: 95 | Performed by: PHYSICIAN ASSISTANT

## 2020-04-27 RX ORDER — HEPARIN SODIUM (PORCINE) LOCK FLUSH IV SOLN 100 UNIT/ML 100 UNIT/ML
500 SOLUTION INTRAVENOUS ONCE
Status: CANCELLED | OUTPATIENT
Start: 2020-05-18

## 2020-04-27 RX ORDER — MEPERIDINE HYDROCHLORIDE 25 MG/ML
25 INJECTION INTRAMUSCULAR; INTRAVENOUS; SUBCUTANEOUS EVERY 30 MIN PRN
Status: CANCELLED | OUTPATIENT
Start: 2020-05-18

## 2020-04-27 RX ORDER — EPINEPHRINE 0.3 MG/.3ML
0.3 INJECTION SUBCUTANEOUS EVERY 5 MIN PRN
Status: CANCELLED | OUTPATIENT
Start: 2020-05-18

## 2020-04-27 RX ORDER — ALBUTEROL SULFATE 90 UG/1
1-2 AEROSOL, METERED RESPIRATORY (INHALATION)
Status: CANCELLED
Start: 2020-05-18

## 2020-04-27 RX ORDER — METHYLPREDNISOLONE SODIUM SUCCINATE 125 MG/2ML
125 INJECTION, POWDER, LYOPHILIZED, FOR SOLUTION INTRAMUSCULAR; INTRAVENOUS
Status: CANCELLED
Start: 2020-05-18

## 2020-04-27 RX ORDER — EPINEPHRINE 1 MG/ML
0.3 INJECTION, SOLUTION INTRAMUSCULAR; SUBCUTANEOUS EVERY 5 MIN PRN
Status: CANCELLED | OUTPATIENT
Start: 2020-05-18

## 2020-04-27 RX ORDER — SODIUM CHLORIDE 9 MG/ML
1000 INJECTION, SOLUTION INTRAVENOUS CONTINUOUS PRN
Status: CANCELLED
Start: 2020-05-18

## 2020-04-27 RX ORDER — ALBUTEROL SULFATE 0.83 MG/ML
2.5 SOLUTION RESPIRATORY (INHALATION)
Status: CANCELLED | OUTPATIENT
Start: 2020-05-18

## 2020-04-27 RX ORDER — LORAZEPAM 2 MG/ML
0.5 INJECTION INTRAMUSCULAR EVERY 4 HOURS PRN
Status: CANCELLED
Start: 2020-05-18

## 2020-04-27 RX ORDER — DIPHENHYDRAMINE HYDROCHLORIDE 50 MG/ML
50 INJECTION INTRAMUSCULAR; INTRAVENOUS
Status: CANCELLED
Start: 2020-05-18

## 2020-04-27 NOTE — PROGRESS NOTES
"Reuben Padilla is a 59 year old male who is being evaluated via a billable telephone visit.      The patient has been notified of following:     \"This video visit will be conducted via a call between you and your physician/provider. We have found that certain health care needs can be provided without the need for an in-person physical exam.  This service lets us provide the care you need with a video conversation.  If a prescription is necessary we can send it directly to your pharmacy.  If lab work is needed we can place an order for that and you can then stop by our lab to have the test done at a later time.    Video visits are billed at different rates depending on your insurance coverage.  Please reach out to your insurance provider with any questions.    If during the course of the call the physician/provider feels a telephone visit is not appropriate, you will not be charged for this service.\"    Patient has given verbal consent for telephone visit? Yes    How [facility]    Patient would like the video invitation sent by: Text to cell phone: [phone]    Will anyone else be joining your video visit? 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 His tumor is positive for cytokeratin 20, negative for P63 and CK7, and HER2 is negative. He started on FOLFOX (5FU/oxaliplatin) on 5/15/2017. He had a delay in treatment from 9/5-10/2/17 due to work related injury.    He had an excellent response by imaging throughout the summer 2017.    He a mixed response by imaging in late fall 2017.    In January 2018, he was switched to taxol and cyramza - had slight progression and neuropathy and clinical trial " became available.       In March 2018, he was started on the [employer] Match Clinical Trial with crizotinib.  (MET amplification) He remained on crizotinib from March - July 2018.    August 2018, he was switched back to taxol/cramza.  In October, he was switched to Keytruda (PD1 overexpressor).    In April 2019, his infusion was held for three weeks, and he was treated with prednisone and Enbrel for grade 3 arthralgias. Keytruda was resumed. He has now been tapered to 5 mg daily and is doing ok with stable disease and stable arthralgias.      INTERVAL HISTORY:   Levi has now been getting his Keytruda every three weeks up in [place].  He has been on a low dose of prednisone at 5 mg.  His arthralgias have been present, and wax and wane, without significantly impacting his daily functioning.  He has been talking to his rheumatologist about Actemra as he has failed Humera and Embrel. He recently had a steroid burst which helped somewhat with his shoulder pain but not his hand pain. He takes 2 oxycodone occasionally for pain.      He has had no issues with fevers or chills, no chest pain or shortness of breath, no nausea, vomiting, diarrhea or constipation.  No rash       He has no abdminal pain.  No n/v/d/c.  He has no dysphagia but he notes that when he eats something dry, like a sandwich, he has to drink more fluids to get it down.      He is on Coumadin now. No bruising or bleeding. His INR is being managed in [place].     Now that Katalina has left, he hopes to follow with Nicole.         ROS: 10 point ROS neg other than the symptoms noted above in the HPI.    PHYSICAL EXAMINATION  None today     Labs:   Labs will be sent from [place] today    IMPRESSION/PLAN:  1. Metastatic esophogeal adenocarcinoma. He has stable disease on Keytruda. He did develop grade 3 arthralgias and was placed on prednisone and Enbrel. The Enbrel didn't help and he is now off.  He has now been tapered to 5 mg daily and is doing ok with stable  arthralgias.  We discussed continuing the current course of therapy and repeating his imaging in May. He has some slight dysphagia to dry foods like sandwiches. He will monitor this and if worsens, we may need to consider endoscopy.     He wishes to follow with Nicole now that Katalina has left.      2. H/o PE 2018.   He has progressed through Xarelto in the past (didn't take it regularly).  He was then on lovenox but his insurance changed and this became unaffordable. He is now on Coumadin and labs drawn  at [facility] in [place] fax ([phone]).  He is monitored by our coumadin clinic.    3. Baseline neuropathy and got worse with chemo and is on gabapentin 600/300/600 with relief    4. Arthralgias. Oxycodone prn (once or twice a week currently) and Prednisone 5 mg. His sx wax and wane  but overall his sx have been stable and he wishes to proceed. He will call us if worsens. He is hoping to try Actemra soon.     5. Insomnia. Ambien qhs     Over 50% of 25 min visit was spent counseling and coordinating care    Paula Rodriguez PA-C

## 2020-04-27 NOTE — LETTER
"4/27/2020       RE: Reuben Padilla  [address] [place] [address]     Dear Colleague,    Thank you for referring your patient, Reuben Padilla, to the [facility]. Please see a copy of my visit note below.    Reuben Padilla is a 59 year old male who is being evaluated via a billable telephone visit.      The patient has been notified of following:     \"This video visit will be conducted via a call between you and your physician/provider. We have found that certain health care needs can be provided without the need for an in-person physical exam.  This service lets us provide the care you need with a video conversation.  If a prescription is necessary we can send it directly to your pharmacy.  If lab work is needed we can place an order for that and you can then stop by our lab to have the test done at a later time.    Video visits are billed at different rates depending on your insurance coverage.  Please reach out to your insurance provider with any questions.    If during the course of the call the physician/provider feels a telephone visit is not appropriate, you will not be charged for this service.\"    Patient has given verbal consent for telephone visit? Yes    How would you like to obtain your AVS? [facility]    Patient would like the video invitation sent by: Text to cell phone: [phone]    Will anyone else be joining your video visit? 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 His tumor is positive for cytokeratin 20, negative for P63 and CK7, and HER2 is negative. He started on FOLFOX (5FU/oxaliplatin) on 5/15/2017. He had a delay in treatment from 9/5-10/2/17 due to work related " injury.    He had an excellent response by imaging throughout the summer 2017.    He a mixed response by imaging in late fall 2017.    In January 2018, he was switched to taxol and cyramza - had slight progression and neuropathy and clinical trial became available.       In March 2018, he was started on the NCI Match Clinical Trial with crizotinib.  (MET amplification) He remained on crizotinib from March - July 2018.    August 2018, he was switched back to taxol/cramza.  In October, he was switched to Keytruda (PD1 overexpressor).    In April 2019, his infusion was held for three weeks, and he was treated with prednisone and Enbrel for grade 3 arthralgias. Keytruda was resumed. He has now been tapered to 5 mg daily and is doing ok with stable disease and stable arthralgias.      INTERVAL HISTORY:   Levi has now been getting his Keytruda every three weeks up in [place].  He has been on a low dose of prednisone at 5 mg.  His arthralgias have been present, and wax and wane, without significantly impacting his daily functioning.  He has been talking to his rheumatologist about Actemra as he has failed Humera and Embrel. He recently had a steroid burst which helped somewhat with his shoulder pain but not his hand pain. He takes 2 oxycodone occasionally for pain.      He has had no issues with fevers or chills, no chest pain or shortness of breath, no nausea, vomiting, diarrhea or constipation.  No rash       He has no abdminal pain.  No n/v/d/c.  He has no dysphagia but he notes that when he eats something dry, like a sandwich, he has to drink more fluids to get it down.      He is on Coumadin now. No bruising or bleeding. His INR is being managed in [place].     Now that Katalina has left, he hopes to follow with Nicole.         ROS: 10 point ROS neg other than the symptoms noted above in the HPI.    PHYSICAL EXAMINATION  None today     Labs:   Labs will be sent from [place] today    IMPRESSION/PLAN:  1. Metastatic  esophogeal adenocarcinoma. He has stable disease on Keytruda. He did develop grade 3 arthralgias and was placed on prednisone and Enbrel. The Enbrel didn't help and he is now off.  He has now been tapered to 5 mg daily and is doing ok with stable arthralgias.  We discussed continuing the current course of therapy and repeating his imaging in May. He has some slight dysphagia to dry foods like sandwiches. He will monitor this and if worsens, we may need to consider endoscopy.     He wishes to follow with Nicole now that Katalina has left.      2. H/o PE 2018.   He has progressed through Xarelto in the past (didn't take it regularly).  He was then on lovenox but his insurance changed and this became unaffordable. He is now on Coumadin and labs drawn  at [facility] in [place] fax ([phone]).  He is monitored by our coumadin clinic.    3. Baseline neuropathy and got worse with chemo and is on gabapentin 600/300/600 with relief    4. Arthralgias. Oxycodone prn (once or twice a week currently) and Prednisone 5 mg. His sx wax and wane  but overall his sx have been stable and he wishes to proceed. He will call us if worsens. He is hoping to try Actemra soon.     5. Insomnia. Ambien qhs     Over 50% of 25 min visit was spent counseling and coordinating care    Paula Rodriguez PA-C               Again, thank you for allowing me to participate in the care of your patient.      Sincerely,    Paula Rodriguez PA-C

## 2020-04-27 NOTE — TELEPHONE ENCOUNTER
[facility] - spoke to Chhaya -  She stated that application is in process

## 2020-04-27 NOTE — PROGRESS NOTES
ANTICOAGULATION FOLLOW-UP CLINIC VISIT    Patient Name:  Reuben Padilla  Date:  2020  Contact Type:  Telephone    SUBJECTIVE:  Patient Findings     Comments:   Spoke with Levi.  He reports no changes in health, diet, medications.  He is currently on 5 mg of prednisone daily and doesn't think this will change.  Recommended he recheck INR in two weeks, but he states he will recheck in three weeks when he is at the [facility].  Advised him to call the [facility] with any changes in medications, any new bruising or changes.          Clinical Outcomes     Comments:   Spoke with Levi.  He reports no changes in health, diet, medications.  He is currently on 5 mg of prednisone daily and doesn't think this will change.  Recommended he recheck INR in two weeks, but he states he will recheck in three weeks when he is at the [facility].  Advised him to call the [facility] with any changes in medications, any new bruising or changes.             OBJECTIVE    INR   Date Value Ref Range Status   2020 2.35 (A) 0.8 - 1.2 Final       ASSESSMENT / PLAN  INR assessment THER    Recheck INR In: 2 WEEKS    INR Location Clinic      Anticoagulation Summary  As of 2020    INR goal:   2.0-3.0   TTR:   92.7 % (1.3 mo)   INR used for dosin.35 (2020)   Warfarin maintenance plan:   7.5 mg (5 mg x 1.5) every Tue; 5 mg (5 mg x 1) all other days   Full warfarin instructions:   7.5 mg every Tue; 5 mg all other days   Weekly warfarin total:   37.5 mg   No change documented:   Lorenza Barlow RN   Plan last modified:   Carola Box RN (2020)   Next INR check:   2020   Priority:   High   Target end date:   Indefinite    Indications    Acute pulmonary embolism (H) [I26.99]  Hx of pulmonary embolus [Z86.711]  Other acute pulmonary embolism without acute cor pulmonale (H) [I26.99]             Anticoagulation Episode Summary     INR check location:       Preferred lab:       Send INR reminders to:   [facility]    Comments:    [facility] (P)[phone] (F)[phone], Takes Warfarin in the am      Anticoagulation Care Providers     Provider Role Specialty Phone number    Kadi Dee MD Referring Hematology [phone]            See the Encounter Report to view Anticoagulation Flowsheet and Dosing Calendar (Go to Encounters tab in chart review, and find the Anticoagulation Therapy Visit)    Spoke with Levi.    Lorenza Barlow RN

## 2020-04-28 RX ORDER — PREDNISONE 5 MG/1
TABLET ORAL
Qty: 42 TABLET | Refills: 1 | Status: SHIPPED | OUTPATIENT
Start: 2020-04-28 | End: 2020-06-29

## 2020-04-28 NOTE — TELEPHONE ENCOUNTER
Michelle Singer MD Beard, Sweetie, RN    Caller: Unspecified (Yesterday,  3:38 PM)               Is he willing to go up again? Last time we discussed he wanted to taper down. He could go up to 20 mg a day x 3 days then 10 mg a day till he starts actemra.      Pt will go up to 20mg daily x 3 days and then stay at 10 mg daily until starting free drug.  Aware that it takes a little bit to get approved, but should be approved.     Will have Dr. Singer sign for taper.    Sweetie Hernandez RN  Rheumatology Clinic

## 2020-05-01 NOTE — TELEPHONE ENCOUNTER
Free Drug Application Approved  Effective Dates - UNTIL FURTHER NOTICE  Patient notified? YES - VIA [facility]   Additional Information SEE BELOW

## 2020-05-07 ENCOUNTER — PATIENT OUTREACH (OUTPATIENT)
Dept: ONCOLOGY | Facility: CLINIC | Age: 60
End: 2020-05-07

## 2020-05-07 ENCOUNTER — MYC MEDICAL ADVICE (OUTPATIENT)
Dept: ONCOLOGY | Facility: CLINIC | Age: 60
End: 2020-05-07

## 2020-05-07 NOTE — PROGRESS NOTES
Received a message from Levi alerting us to a fax he is sending for updated disability paperwork. He also had questions on his appointments 5/18. Sent Clarification message to Dr Dee regarding appointments. MyChart message sent to Levi updating him on writer waiting to hear back from Dr Dee.

## 2020-05-08 NOTE — TELEPHONE ENCOUNTER
STD forms received via fax from [facility].      Forms to be completed and put in folder for provider to approve.    Fax #:  [phone]      Piedad Smith CMA ([facility])

## 2020-05-14 ENCOUNTER — TELEPHONE (OUTPATIENT)
Dept: RHEUMATOLOGY | Facility: CLINIC | Age: 60
End: 2020-05-14

## 2020-05-14 NOTE — TELEPHONE ENCOUNTER
Pt called into clinic, he did get Actemra delivered today, he will start shots today. He wanted to make sure there would be no side effects, as he is coming to the [facility] on Monday. Discussed that there could be injection site reaction, discussed how to deal with that.  Pt understands.    He is wondering what he should do with his prednisone after he start Actemra, should he stay at 10 mg a day for a while, should he stop, should he decrease to 5 mg a day for a while?    Will send message to Dr. Singer to review and advise.    Sweetie Hernandez RN  Rheumatology Clinic

## 2020-05-15 NOTE — TELEPHONE ENCOUNTER
Michelle Singer MD Beard, Madeline, RN    Caller: Unspecified (Yesterday, 10:59 AM)               Stay on 10 mg every day x one week, then 7.5 mg a day x one week then 5 mg a day and stay on 5 mg a day

## 2020-05-15 NOTE — TELEPHONE ENCOUNTER
Called and updated pt with plan for prednisone.  He understands.  All questions answered.  He will call if he has any questions or things do not improve on medication.    Sweetie Hernandez RN  Rheumatology Clinic

## 2020-05-18 ENCOUNTER — ANTICOAGULATION THERAPY VISIT (OUTPATIENT)
Dept: ANTICOAGULATION | Facility: CLINIC | Age: 60
End: 2020-05-18

## 2020-05-18 ENCOUNTER — INFUSION THERAPY VISIT (OUTPATIENT)
Dept: ONCOLOGY | Facility: CLINIC | Age: 60
End: 2020-05-18
Attending: INTERNAL MEDICINE
Payer: MEDICARE

## 2020-05-18 ENCOUNTER — APPOINTMENT (OUTPATIENT)
Dept: LAB | Facility: CLINIC | Age: 60
End: 2020-05-18
Attending: INTERNAL MEDICINE
Payer: MEDICARE

## 2020-05-18 ENCOUNTER — ANCILLARY PROCEDURE (OUTPATIENT)
Dept: CT IMAGING | Facility: CLINIC | Age: 60
End: 2020-05-18
Attending: INTERNAL MEDICINE
Payer: MEDICARE

## 2020-05-18 VITALS
TEMPERATURE: 98 F | BODY MASS INDEX: 43.06 KG/M2 | RESPIRATION RATE: 16 BRPM | HEART RATE: 97 BPM | DIASTOLIC BLOOD PRESSURE: 82 MMHG | SYSTOLIC BLOOD PRESSURE: 137 MMHG | OXYGEN SATURATION: 95 % | WEIGHT: 315 LBS

## 2020-05-18 DIAGNOSIS — I26.99 ACUTE PULMONARY EMBOLISM (H): ICD-10-CM

## 2020-05-18 DIAGNOSIS — D70.1 CHEMOTHERAPY-INDUCED NEUTROPENIA (H): ICD-10-CM

## 2020-05-18 DIAGNOSIS — M19.90 INFLAMMATORY ARTHRITIS: ICD-10-CM

## 2020-05-18 DIAGNOSIS — Z86.711 HX OF PULMONARY EMBOLUS: ICD-10-CM

## 2020-05-18 DIAGNOSIS — C79.9 METASTASIS FROM GASTRIC CANCER (H): ICD-10-CM

## 2020-05-18 DIAGNOSIS — T45.1X5A CHEMOTHERAPY-INDUCED NEUROPATHY (H): ICD-10-CM

## 2020-05-18 DIAGNOSIS — C16.9 METASTASIS FROM GASTRIC CANCER (H): ICD-10-CM

## 2020-05-18 DIAGNOSIS — I26.99 OTHER ACUTE PULMONARY EMBOLISM WITHOUT ACUTE COR PULMONALE (H): ICD-10-CM

## 2020-05-18 DIAGNOSIS — C15.5 CANCER OF DISTAL THIRD OF ESOPHAGUS (H): ICD-10-CM

## 2020-05-18 DIAGNOSIS — T45.1X5A CHEMOTHERAPY-INDUCED NEUTROPENIA (H): ICD-10-CM

## 2020-05-18 DIAGNOSIS — G62.0 CHEMOTHERAPY-INDUCED NEUROPATHY (H): ICD-10-CM

## 2020-05-18 DIAGNOSIS — C15.5 CANCER OF DISTAL THIRD OF ESOPHAGUS (H): Primary | ICD-10-CM

## 2020-05-18 LAB
ALBUMIN SERPL-MCNC: 3.8 G/DL (ref 3.4–5)
ALP SERPL-CCNC: 72 U/L (ref 40–150)
ALT SERPL W P-5'-P-CCNC: 26 U/L (ref 0–70)
ANION GAP SERPL CALCULATED.3IONS-SCNC: 5 MMOL/L (ref 3–14)
AST SERPL W P-5'-P-CCNC: 14 U/L (ref 0–45)
BASOPHILS # BLD AUTO: 0 10E9/L (ref 0–0.2)
BASOPHILS NFR BLD AUTO: 0.7 %
BILIRUB SERPL-MCNC: 0.7 MG/DL (ref 0.2–1.3)
BUN SERPL-MCNC: 22 MG/DL (ref 7–30)
CALCIUM SERPL-MCNC: 8.9 MG/DL (ref 8.5–10.1)
CHLORIDE SERPL-SCNC: 112 MMOL/L (ref 94–109)
CO2 SERPL-SCNC: 26 MMOL/L (ref 20–32)
CREAT SERPL-MCNC: 0.95 MG/DL (ref 0.66–1.25)
DIFFERENTIAL METHOD BLD: ABNORMAL
EOSINOPHIL # BLD AUTO: 0 10E9/L (ref 0–0.7)
EOSINOPHIL NFR BLD AUTO: 1 %
ERYTHROCYTE [DISTWIDTH] IN BLOOD BY AUTOMATED COUNT: 14 % (ref 10–15)
GFR SERPL CREATININE-BSD FRML MDRD: 87 ML/MIN/{1.73_M2}
GLUCOSE SERPL-MCNC: 107 MG/DL (ref 70–99)
HCT VFR BLD AUTO: 44.7 % (ref 40–53)
HGB BLD-MCNC: 14.9 G/DL (ref 13.3–17.7)
IMM GRANULOCYTES # BLD: 0.1 10E9/L (ref 0–0.4)
IMM GRANULOCYTES NFR BLD: 1.7 %
INR PPP: 2.03 (ref 0.86–1.14)
LYMPHOCYTES # BLD AUTO: 1.3 10E9/L (ref 0.8–5.3)
LYMPHOCYTES NFR BLD AUTO: 31.4 %
MCH RBC QN AUTO: 30.3 PG (ref 26.5–33)
MCHC RBC AUTO-ENTMCNC: 33.3 G/DL (ref 31.5–36.5)
MCV RBC AUTO: 91 FL (ref 78–100)
MONOCYTES # BLD AUTO: 0.3 10E9/L (ref 0–1.3)
MONOCYTES NFR BLD AUTO: 6.9 %
NEUTROPHILS # BLD AUTO: 2.4 10E9/L (ref 1.6–8.3)
NEUTROPHILS NFR BLD AUTO: 58.3 %
NRBC # BLD AUTO: 0 10*3/UL
NRBC BLD AUTO-RTO: 0 /100
PLATELET # BLD AUTO: 142 10E9/L (ref 150–450)
POTASSIUM SERPL-SCNC: 4 MMOL/L (ref 3.4–5.3)
PROT SERPL-MCNC: 7.2 G/DL (ref 6.8–8.8)
RBC # BLD AUTO: 4.91 10E12/L (ref 4.4–5.9)
SODIUM SERPL-SCNC: 143 MMOL/L (ref 133–144)
TSH SERPL DL<=0.005 MIU/L-ACNC: 2.21 MU/L (ref 0.4–4)
WBC # BLD AUTO: 4 10E9/L (ref 4–11)

## 2020-05-18 PROCEDURE — 25800030 ZZH RX IP 258 OP 636: Mod: ZF | Performed by: PHYSICIAN ASSISTANT

## 2020-05-18 PROCEDURE — 85025 COMPLETE CBC W/AUTO DIFF WBC: CPT | Performed by: PHYSICIAN ASSISTANT

## 2020-05-18 PROCEDURE — 99214 OFFICE O/P EST MOD 30 MIN: CPT | Mod: ZP | Performed by: INTERNAL MEDICINE

## 2020-05-18 PROCEDURE — 84443 ASSAY THYROID STIM HORMONE: CPT | Performed by: PHYSICIAN ASSISTANT

## 2020-05-18 PROCEDURE — 96413 CHEMO IV INFUSION 1 HR: CPT

## 2020-05-18 PROCEDURE — 80053 COMPREHEN METABOLIC PANEL: CPT | Performed by: PHYSICIAN ASSISTANT

## 2020-05-18 PROCEDURE — 25000128 H RX IP 250 OP 636: Mod: ZF | Performed by: INTERNAL MEDICINE

## 2020-05-18 PROCEDURE — 85610 PROTHROMBIN TIME: CPT | Performed by: PHYSICIAN ASSISTANT

## 2020-05-18 PROCEDURE — 25000128 H RX IP 250 OP 636: Mod: ZF | Performed by: PHYSICIAN ASSISTANT

## 2020-05-18 RX ORDER — HEPARIN SODIUM (PORCINE) LOCK FLUSH IV SOLN 100 UNIT/ML 100 UNIT/ML
5 SOLUTION INTRAVENOUS
Status: DISCONTINUED | OUTPATIENT
Start: 2020-05-18 | End: 2020-05-21 | Stop reason: HOSPADM

## 2020-05-18 RX ORDER — PREDNISONE 5 MG/1
5 TABLET ORAL DAILY
Qty: 90 TABLET | Refills: 3 | Status: SHIPPED | OUTPATIENT
Start: 2020-05-18 | End: 2020-09-21

## 2020-05-18 RX ORDER — GABAPENTIN 300 MG/1
CAPSULE ORAL
Qty: 450 CAPSULE | Refills: 1 | Status: SHIPPED | OUTPATIENT
Start: 2020-05-18 | End: 2020-10-16

## 2020-05-18 RX ORDER — OXYCODONE HYDROCHLORIDE 10 MG/1
10 TABLET ORAL EVERY 4 HOURS PRN
Qty: 60 TABLET | Refills: 0 | Status: SHIPPED | OUTPATIENT
Start: 2020-05-18 | End: 2020-09-21

## 2020-05-18 RX ORDER — IOPAMIDOL 755 MG/ML
135 INJECTION, SOLUTION INTRAVASCULAR ONCE
Status: COMPLETED | OUTPATIENT
Start: 2020-05-18 | End: 2020-05-18

## 2020-05-18 RX ORDER — OXYCODONE HYDROCHLORIDE 10 MG/1
10 TABLET ORAL EVERY 4 HOURS PRN
Qty: 60 TABLET | Refills: 0 | Status: SHIPPED | OUTPATIENT
Start: 2020-05-18 | End: 2020-05-18

## 2020-05-18 RX ORDER — HEPARIN SODIUM (PORCINE) LOCK FLUSH IV SOLN 100 UNIT/ML 100 UNIT/ML
500 SOLUTION INTRAVENOUS ONCE
Status: COMPLETED | OUTPATIENT
Start: 2020-05-18 | End: 2020-05-18

## 2020-05-18 RX ORDER — WARFARIN SODIUM 5 MG/1
5 TABLET ORAL DAILY
Qty: 90 TABLET | Refills: 3 | Status: SHIPPED | OUTPATIENT
Start: 2020-05-18 | End: 2020-12-14

## 2020-05-18 RX ADMIN — Medication 500 UNITS: at 15:20

## 2020-05-18 RX ADMIN — SODIUM CHLORIDE 250 ML: 9 INJECTION, SOLUTION INTRAVENOUS at 14:27

## 2020-05-18 RX ADMIN — SODIUM CHLORIDE 200 MG: 9 INJECTION, SOLUTION INTRAVENOUS at 14:31

## 2020-05-18 RX ADMIN — Medication 5 ML: at 07:45

## 2020-05-18 RX ADMIN — IOPAMIDOL 135 ML: 755 INJECTION, SOLUTION INTRAVASCULAR at 08:15

## 2020-05-18 ASSESSMENT — PAIN SCALES - GENERAL: PAINLEVEL: MODERATE PAIN (4)

## 2020-05-18 NOTE — PROGRESS NOTES
Infusion Nursing Note:  Reuben Padilla presents today for C26 D1 Keytruda.    Patient seen by provider today: Yes: Dr. Dee   present during visit today: Not Applicable.    Note: Patient arrives feeling well and received good news from his CT results and Dr. Dee. Pt received previous 2 cycles of Keytruda up at his local [facility] (apart of [facility] per pt). Pharmacy unable to find documentation from outside clinic, but received confirmed to proceed verbally from Dr. Dee. Pt states he will receive his next two infusions up at [facility] again and Dr. Dee aware.    InBasket sent to Dr. Dee to update [facility] to reflect current cycle.    Intravenous Access:  Implanted Port.    Treatment Conditions:  Lab Results   Component Value Date    HGB 14.9 05/18/2020     Lab Results   Component Value Date    WBC 4.0 05/18/2020      Lab Results   Component Value Date    ANEU 2.4 05/18/2020     Lab Results   Component Value Date     05/18/2020      Lab Results   Component Value Date     05/18/2020                   Lab Results   Component Value Date    POTASSIUM 4.0 05/18/2020           Lab Results   Component Value Date    MAG 1.9 11/07/2018            Lab Results   Component Value Date    CR 0.95 05/18/2020                   Lab Results   Component Value Date    NIDHI 8.9 05/18/2020                Lab Results   Component Value Date    BILITOTAL 0.7 05/18/2020           Lab Results   Component Value Date    ALBUMIN 3.8 05/18/2020                    Lab Results   Component Value Date    ALT 26 05/18/2020           Lab Results   Component Value Date    AST 14 05/18/2020       Results reviewed, labs MET treatment parameters, ok to proceed with treatment.      Post Infusion Assessment:  Patient tolerated infusion without incident.  Blood return noted pre and post infusion.  Site patent and intact, free from redness, edema or discomfort.  No evidence of extravasations.  Access  discontinued per protocol.       Discharge Plan:   Patient declined prescription refills.  Discharge instructions reviewed with: Patient.  Patient and/or family verbalized understanding of discharge instructions and all questions answered.  AVS to patient via [employer].  Patient will return 8/10 for next appointment. Will have telephone visit with SERGIO Morales on 6/29.  Patient discharged in stable condition accompanied by: self.  Departure Mode: Ambulatory.    Carley Robles RN

## 2020-05-18 NOTE — NURSING NOTE
Chief Complaint   Patient presents with     Port Draw     VS and Wt done, port accessed without difficulty, labs draw.     Paolo Bonner RN on 5/18/2020 at 7:50 AM

## 2020-05-18 NOTE — LETTER
5/18/2020       RE: Reuben Padilla  [address] [place] [address]     Dear Colleague,    Thank you for referring your patient, Reuben Padilla, to the [facility]. Please see a copy of my visit note below.    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 His tumor is positive for cytokeratin 20, negative for P63 and CK7, and HER2 is negative. He started on FOLFOX (5FU/oxaliplatin) on 5/15/2017. He had a delay in treatment from 9/5-10/2/17 due to work related injury.    He had an excellent response by imaging throughout the summer 2017.    He a mixed response by imaging in late fall 2017.    In January 2018, he was switched to taxol and cyramza - had slight progression and neuropathy and clinical trial became available.       In March 2018, he was started on the [facility] Match Clinical Trial with crizotinib.  (MET amplification) He remained on crizotinib from March - July 2018.    August 2018, he was switched back to taxol/cramza.  In October, he was switched to Keytruda (PD1 overexpressor).    In April 2019, his infusion was held for three weeks, and he was treated with prednisone for grade 3 arthralgias.  Keytruda was resumed.  He was seeing rheumatology and had a course of Enbrel.    INTERVAL HISTORY: Levi comes in today for followup.      He is now on Keytruda every three weeks.  He has been on a low dose of prednisone at 5 mg.  His arthralgias recently flared up further and he is now on a prednisone taper again and has restarted actemra.  His hand  is improved.     He has had no issues with fevers or chills, no chest pain or shortness of breath, no nausea, vomiting, diarrhea or constipation.         He has no abdminal pain.  No n/v/d/c.       His energy levels are good.        No dysphagia.        ROS: 10 point ROS neg other than the symptoms noted above in the HPI.    PHYSICAL EXAMINATION  /82 (BP Location: Right arm, Patient Position: Sitting, Cuff Size: Adult Regular)   Pulse 97   Temp 98  F (36.7  C) (Oral)   Resp 16   Wt (!) 169.5 kg (373 lb 11.2 oz)   SpO2 95%   BMI 43.06 kg/m     Wt Readings from Last 4 Encounters:   05/18/20 (!) 169.5 kg (373 lb 11.2 oz)   03/16/20 (!) 166 kg (365 lb 14.4 oz)   02/20/20 (!) 165.5 kg (364 lb 12.8 oz)   01/27/20 (!) 162.4 kg (358 lb)     Constitutional: Alert, oriented male in no visible distress.  Eyes: PERRL. Anicteric sclerae.  ENT/Mouth: OM moist and pink without lesions or thrush.  CV: RRR  Resp: CTAB throughout  Abdomen: Soft, non-tender, non-distended. Obese. Bowel sounds present. Unable to palpate liver or spleen.  No RLQ tenderness.  Extremities: trace edema , no erythema or warmth over joints  Skin: Warm, dry. Hyperpigmentation in his lower extremities R > L, no warmth  Lymph: No cervical or supraclavicular lymphadenopathy appreciated.   Neuro: CN II-XII grossly intact.     MUSC: no erythema or warmth over shoulders, DIPs, PIPs.  Hand strength normal  ECOG PS: 1          Lab Results   Component Value Date    WBC 4.0 05/18/2020     Lab Results   Component Value Date    RBC 4.91 05/18/2020     Lab Results   Component Value Date    HGB 14.9 05/18/2020     Lab Results   Component Value Date    HCT 44.7 05/18/2020     No components found for: MCT  Lab Results   Component Value Date    MCV 91 05/18/2020     Lab Results   Component Value Date    MCH 30.3 05/18/2020     Lab Results   Component Value Date    MCHC 33.3 05/18/2020     Lab Results   Component Value Date    RDW 14.0 05/18/2020     Lab Results   Component Value Date     05/18/2020       Recent Labs   Lab Test 05/18/20  0745 04/27/20    145   POTASSIUM 4.0 4.1   CHLORIDE 112* 113*   CO2 26 23   ANIONGAP 5 9   * 106*   BUN 22 23   CR 0.95 0.97   NIDHI 8.9  9.3     Liver Function Studies -   Recent Labs   Lab Test 05/18/20  0745   PROTTOTAL 7.2   ALBUMIN 3.8   BILITOTAL 0.7   ALKPHOS 72   AST 14   ALT 26       CT scan reviewed : overall decreased mass at GE junction.  No new masses or lesions.     IMPRESSION/PLAN:  1. Metastatic esophogeal adenocarcinoma. He has had treatment with FOLFOX and then transitioned to Taxol with ramicurimab.  He was then on crizotinib on the NCI Match study.  He progressed on this and returned on taxol/cyramza. And is now on Keytruda.    I am very pleased with how he is doing on Keytruda.  He did develop grade 2 arthralgias and was placed on prednisone and actemra.  He has now been tapering and is doing well with stable disease and stable arthralgias.      We discussed continuing the current course of therapy and repeating his imaging in 12 weeks.    He may be eligible for the QUILT study in the event he develops progressive disease.      Other treatment options would include irinotecan.      He does have a history of a PE.  He is on coumadin      He does have baseline neuropathy and is on gabapentin 600/300/600.      He understands all therapies are palliative.        Kadi Dee MD

## 2020-05-18 NOTE — PROGRESS NOTES
ANTICOAGULATION FOLLOW-UP CLINIC VISIT    Patient Name:  Reuben Padilla  Date:  2020  Contact Type:  Telephone    SUBJECTIVE:  Patient Findings     Comments:   Patient start a new medication called Acterma and according to the literature should should not interact with warfarin.  Prednisone dose was increased for a few days and now tapering back to usual 5mg dose.         Clinical Outcomes     Comments:   Patient start a new medication called Acterma and according to the literature should should not interact with warfarin.  Prednisone dose was increased for a few days and now tapering back to usual 5mg dose.            OBJECTIVE    Recent labs: (last 7 days)     20  0745   INR 2.03*       ASSESSMENT / PLAN  No question data found.  Anticoagulation Summary  As of 2020    INR goal:   2.0-3.0   TTR:   95.3 % (2 mo)   INR used for dosin.03 (2020)   Warfarin maintenance plan:   7.5 mg (5 mg x 1.5) every Tue; 5 mg (5 mg x 1) all other days   Full warfarin instructions:   7.5 mg every Tue; 5 mg all other days   Weekly warfarin total:   37.5 mg   No change documented:   Carola Box RN   Plan last modified:   Carola Box RN (2020)   Next INR check:   2020   Priority:   High   Target end date:   Indefinite    Indications    Acute pulmonary embolism (H) [I26.99]  Hx of pulmonary embolus [Z86.711]  Other acute pulmonary embolism without acute cor pulmonale (H) [I26.99]             Anticoagulation Episode Summary     INR check location:       Preferred lab:       Send INR reminders to:   [facility]    Comments:   [facility] (P)[phone] (F)[phone], Takes Warfarin in the am      Anticoagulation Care Providers     Provider Role Specialty Phone number    Kadi Dee MD Referring Hematology [phone]            See the Encounter Report to view Anticoagulation Flowsheet and Dosing Calendar (Go to Encounters tab in chart review, and find the  Anticoagulation Therapy Visit)    Spoke with patient.     Carola Box RN

## 2020-05-18 NOTE — NURSING NOTE
"Oncology Rooming Note    May 18, 2020 12:15 PM   Reuben Padilla is a 59 year old male who presents for:    Chief Complaint   Patient presents with     Port Draw     VS and Wt done, port accessed without difficulty, labs draw.     Oncology Clinic Visit     Pt is here for a rtn for Cancer of distal Esophagus     Initial Vitals: Blood Pressure 137/82 (BP Location: Right arm, Patient Position: Sitting, Cuff Size: Adult Regular)   Pulse 97   Temperature 98  F (36.7  C) (Oral)   Respiration 16   Weight (Abnormal) 169.5 kg (373 lb 11.2 oz)   Oxygen Saturation 95%   Body Mass Index 43.06 kg/m   Estimated body mass index is 43.06 kg/m  as calculated from the following:    Height as of 2/20/20: 1.984 m (6' 6.11\").    Weight as of this encounter: 169.5 kg (373 lb 11.2 oz). Body surface area is 3.06 meters squared.  Moderate Pain (4) Comment: joints   No LMP for male patient.  Allergies reviewed: Yes  Medications reviewed: Yes    Medications: Medication refills not needed today.  Pharmacy name entered into EPIC:    [id number] - [place], [place] - [address] [facility] - [place], [place] - [address] [facility] - [facility] [address]    Clinical concerns: none       Sushma Tyler MA            "

## 2020-05-19 NOTE — TELEPHONE ENCOUNTER
Form signed and faxed: [phone] as requested. Copy mailed to patient at home address.     Brigette Guardado CMA ([facility])

## 2020-05-21 ENCOUNTER — PATIENT OUTREACH (OUTPATIENT)
Dept: ONCOLOGY | Facility: CLINIC | Age: 60
End: 2020-05-21

## 2020-05-21 NOTE — PROGRESS NOTES
Spoke to Levi to let him know writer received his VMM and will fax orders for the next 2 cycles of Keytruda to [facility] once the MD signs the orders.     He had no additional questions or concerns.

## 2020-05-21 NOTE — PROGRESS NOTES
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 His tumor is positive for cytokeratin 20, negative for P63 and CK7, and HER2 is negative. He started on FOLFOX (5FU/oxaliplatin) on 5/15/2017. He had a delay in treatment from 9/5-10/2/17 due to work related injury.    He had an excellent response by imaging throughout the summer 2017.    He a mixed response by imaging in late fall 2017.    In January 2018, he was switched to taxol and cyramza - had slight progression and neuropathy and clinical trial became available.       In March 2018, he was started on the [facility] Match Clinical Trial with crizotinib.  (MET amplification) He remained on crizotinib from March - July 2018.    August 2018, he was switched back to taxol/cramza.  In October, he was switched to Keytruda (PD1 overexpressor).    In April 2019, his infusion was held for three weeks, and he was treated with prednisone for grade 3 arthralgias.  Keytruda was resumed.  He was seeing rheumatology and had a course of Enbrel.    INTERVAL HISTORY: Levi comes in today for followup.      He is now on Keytruda every three weeks.  He has been on a low dose of prednisone at 5 mg.  His arthralgias recently flared up further and he is now on a prednisone taper again and has restarted actemra.  His hand  is improved.     He has had no issues with fevers or chills, no chest pain or shortness of breath, no nausea, vomiting, diarrhea or constipation.         He has no abdminal pain.  No n/v/d/c.       His energy levels are good.       No dysphagia.        ROS: 10 point ROS neg other than the symptoms noted above in the HPI.    PHYSICAL EXAMINATION  /82 (BP Location: Right arm, Patient Position: Sitting, Cuff Size: Adult Regular)   Pulse 97   Temp 98  F (36.7   C) (Oral)   Resp 16   Wt (!) 169.5 kg (373 lb 11.2 oz)   SpO2 95%   BMI 43.06 kg/m     Wt Readings from Last 4 Encounters:   05/18/20 (!) 169.5 kg (373 lb 11.2 oz)   03/16/20 (!) 166 kg (365 lb 14.4 oz)   02/20/20 (!) 165.5 kg (364 lb 12.8 oz)   01/27/20 (!) 162.4 kg (358 lb)     Constitutional: Alert, oriented male in no visible distress.  Eyes: PERRL. Anicteric sclerae.  ENT/Mouth: OM moist and pink without lesions or thrush.  CV: RRR  Resp: CTAB throughout  Abdomen: Soft, non-tender, non-distended. Obese. Bowel sounds present. Unable to palpate liver or spleen.  No RLQ tenderness.  Extremities: trace edema , no erythema or warmth over joints  Skin: Warm, dry. Hyperpigmentation in his lower extremities R > L, no warmth  Lymph: No cervical or supraclavicular lymphadenopathy appreciated.   Neuro: CN II-XII grossly intact.     MUSC: no erythema or warmth over shoulders, DIPs, PIPs.  Hand strength normal  ECOG PS: 1          Lab Results   Component Value Date    WBC 4.0 05/18/2020     Lab Results   Component Value Date    RBC 4.91 05/18/2020     Lab Results   Component Value Date    HGB 14.9 05/18/2020     Lab Results   Component Value Date    HCT 44.7 05/18/2020     No components found for: MCT  Lab Results   Component Value Date    MCV 91 05/18/2020     Lab Results   Component Value Date    MCH 30.3 05/18/2020     Lab Results   Component Value Date    MCHC 33.3 05/18/2020     Lab Results   Component Value Date    RDW 14.0 05/18/2020     Lab Results   Component Value Date     05/18/2020       Recent Labs   Lab Test 05/18/20  0745 04/27/20    145   POTASSIUM 4.0 4.1   CHLORIDE 112* 113*   CO2 26 23   ANIONGAP 5 9   * 106*   BUN 22 23   CR 0.95 0.97   NIDHI 8.9 9.3     Liver Function Studies -   Recent Labs   Lab Test 05/18/20  0745   PROTTOTAL 7.2   ALBUMIN 3.8   BILITOTAL 0.7   ALKPHOS 72   AST 14   ALT 26       CT scan reviewed : overall decreased mass at GE junction.  No new masses or  lesions.     IMPRESSION/PLAN:  1. Metastatic esophogeal adenocarcinoma. He has had treatment with FOLFOX and then transitioned to Taxol with ramicurimab.  He was then on crizotinib on the [employer] Match study.  He progressed on this and returned on taxol/cyramza. And is now on Keytruda.    I am very pleased with how he is doing on Keytruda.  He did develop grade 2 arthralgias and was placed on prednisone and actemra.  He has now been tapering and is doing well with stable disease and stable arthralgias.      We discussed continuing the current course of therapy and repeating his imaging in 12 weeks.    He may be eligible for the QUILT study in the event he develops progressive disease.      Other treatment options would include irinotecan.      He does have a history of a PE.  He is on coumadin      He does have baseline neuropathy and is on gabapentin 600/300/600.      He understands all therapies are palliative.        Kadi Dee MD

## 2020-05-22 RX ORDER — EPINEPHRINE 1 MG/ML
0.3 INJECTION, SOLUTION INTRAMUSCULAR; SUBCUTANEOUS EVERY 5 MIN PRN
Status: CANCELLED | OUTPATIENT
Start: 2020-06-08

## 2020-05-22 RX ORDER — DIPHENHYDRAMINE HYDROCHLORIDE 50 MG/ML
50 INJECTION INTRAMUSCULAR; INTRAVENOUS
Status: CANCELLED
Start: 2020-06-08

## 2020-05-22 RX ORDER — HEPARIN SODIUM (PORCINE) LOCK FLUSH IV SOLN 100 UNIT/ML 100 UNIT/ML
500 SOLUTION INTRAVENOUS ONCE
Status: CANCELLED | OUTPATIENT
Start: 2020-07-02

## 2020-05-22 RX ORDER — ALBUTEROL SULFATE 90 UG/1
1-2 AEROSOL, METERED RESPIRATORY (INHALATION)
Status: CANCELLED
Start: 2020-07-02

## 2020-05-22 RX ORDER — ALBUTEROL SULFATE 90 UG/1
1-2 AEROSOL, METERED RESPIRATORY (INHALATION)
Status: CANCELLED
Start: 2020-06-08

## 2020-05-22 RX ORDER — METHYLPREDNISOLONE SODIUM SUCCINATE 125 MG/2ML
125 INJECTION, POWDER, LYOPHILIZED, FOR SOLUTION INTRAMUSCULAR; INTRAVENOUS
Status: CANCELLED
Start: 2020-07-02

## 2020-05-22 RX ORDER — HEPARIN SODIUM (PORCINE) LOCK FLUSH IV SOLN 100 UNIT/ML 100 UNIT/ML
500 SOLUTION INTRAVENOUS ONCE
Status: CANCELLED | OUTPATIENT
Start: 2020-06-08

## 2020-05-22 RX ORDER — LORAZEPAM 2 MG/ML
0.5 INJECTION INTRAMUSCULAR EVERY 4 HOURS PRN
Status: CANCELLED
Start: 2020-07-02

## 2020-05-22 RX ORDER — SODIUM CHLORIDE 9 MG/ML
1000 INJECTION, SOLUTION INTRAVENOUS CONTINUOUS PRN
Status: CANCELLED
Start: 2020-07-02

## 2020-05-22 RX ORDER — MEPERIDINE HYDROCHLORIDE 25 MG/ML
25 INJECTION INTRAMUSCULAR; INTRAVENOUS; SUBCUTANEOUS EVERY 30 MIN PRN
Status: CANCELLED | OUTPATIENT
Start: 2020-06-08

## 2020-05-22 RX ORDER — METHYLPREDNISOLONE SODIUM SUCCINATE 125 MG/2ML
125 INJECTION, POWDER, LYOPHILIZED, FOR SOLUTION INTRAMUSCULAR; INTRAVENOUS
Status: CANCELLED
Start: 2020-06-08

## 2020-05-22 RX ORDER — ALBUTEROL SULFATE 0.83 MG/ML
2.5 SOLUTION RESPIRATORY (INHALATION)
Status: CANCELLED | OUTPATIENT
Start: 2020-07-02

## 2020-05-22 RX ORDER — ALBUTEROL SULFATE 0.83 MG/ML
2.5 SOLUTION RESPIRATORY (INHALATION)
Status: CANCELLED | OUTPATIENT
Start: 2020-06-08

## 2020-05-22 RX ORDER — EPINEPHRINE 0.3 MG/.3ML
0.3 INJECTION SUBCUTANEOUS EVERY 5 MIN PRN
Status: CANCELLED | OUTPATIENT
Start: 2020-06-08

## 2020-05-22 RX ORDER — SODIUM CHLORIDE 9 MG/ML
1000 INJECTION, SOLUTION INTRAVENOUS CONTINUOUS PRN
Status: CANCELLED
Start: 2020-06-08

## 2020-05-22 RX ORDER — MEPERIDINE HYDROCHLORIDE 25 MG/ML
25 INJECTION INTRAMUSCULAR; INTRAVENOUS; SUBCUTANEOUS EVERY 30 MIN PRN
Status: CANCELLED | OUTPATIENT
Start: 2020-07-02

## 2020-05-22 RX ORDER — DIPHENHYDRAMINE HYDROCHLORIDE 50 MG/ML
50 INJECTION INTRAMUSCULAR; INTRAVENOUS
Status: CANCELLED
Start: 2020-07-02

## 2020-05-22 RX ORDER — EPINEPHRINE 1 MG/ML
0.3 INJECTION, SOLUTION INTRAMUSCULAR; SUBCUTANEOUS EVERY 5 MIN PRN
Status: CANCELLED | OUTPATIENT
Start: 2020-07-02

## 2020-05-22 RX ORDER — LORAZEPAM 2 MG/ML
0.5 INJECTION INTRAMUSCULAR EVERY 4 HOURS PRN
Status: CANCELLED
Start: 2020-06-08

## 2020-05-22 RX ORDER — EPINEPHRINE 0.3 MG/.3ML
0.3 INJECTION SUBCUTANEOUS EVERY 5 MIN PRN
Status: CANCELLED | OUTPATIENT
Start: 2020-07-02

## 2020-05-27 ENCOUNTER — PATIENT OUTREACH (OUTPATIENT)
Dept: ONCOLOGY | Facility: CLINIC | Age: 60
End: 2020-05-27

## 2020-05-27 NOTE — PROGRESS NOTES
Received a call from Karlie at [facility] asking for a return call [phone] to clarify infusion orders.     Spoke to Melissa and provided verbal orders to infuse Keytruda on 6/29 which is 3 weeks from the scheduled 6/8 infusion.

## 2020-05-29 NOTE — TELEPHONE ENCOUNTER
Request received to revise paperwork to state that patient will not be able to return to work. Paperwork revised and faxed back to [employer], fax # [phone]. Call to patient with no answer, will try back to discuss with patient.     Anat Orozco CMA

## 2020-06-08 ENCOUNTER — ANTICOAGULATION THERAPY VISIT (OUTPATIENT)
Dept: ANTICOAGULATION | Facility: CLINIC | Age: 60
End: 2020-06-08

## 2020-06-08 ENCOUNTER — TRANSFERRED RECORDS (OUTPATIENT)
Dept: HEALTH INFORMATION MANAGEMENT | Facility: CLINIC | Age: 60
End: 2020-06-08

## 2020-06-08 DIAGNOSIS — I26.99 ACUTE PULMONARY EMBOLISM (H): ICD-10-CM

## 2020-06-08 DIAGNOSIS — Z86.711 HX OF PULMONARY EMBOLUS: ICD-10-CM

## 2020-06-08 DIAGNOSIS — I26.99 OTHER ACUTE PULMONARY EMBOLISM WITHOUT ACUTE COR PULMONALE (H): ICD-10-CM

## 2020-06-08 LAB
ALBUMIN SERPL-MCNC: 4 G/DL (ref 3.5–4.8)
ALBUMIN/GLOB SERPL: 1.5 {RATIO} (ref 1.1–2.2)
ALP SERPL-CCNC: 77 U/L (ref 40–150)
ALT SERPL-CCNC: 23 U/L (ref 0–55)
ANION GAP SERPL CALCULATED.3IONS-SCNC: 9 MMOL/L (ref 5–15)
AST SERPL-CCNC: 20 U/L (ref 5–34)
BILIRUB SERPL-MCNC: 0.7 MG/DL (ref 0.3–1.2)
BUN SERPL-MCNC: 12 MG/DL (ref 9–25)
CALCIUM SERPL-MCNC: 8.9 MG/DL (ref 8.4–10.2)
CHLORIDE SERPLBLD-SCNC: 112 MMOL/L (ref 98–107)
CO2 SERPL-SCNC: 21 MMOL/L (ref 22–30)
CREAT SERPL-MCNC: 1.02 MG/DL (ref 0.7–1.3)
GFR SERPL CREATININE-BSD FRML MDRD: >60 ML/MIN/1.73M2
GLOBULIN: 2.7 G/DL (ref 2.4–3.5)
GLUCOSE SERPL-MCNC: 115 MG/DL (ref 70–99)
INR PPP: 3.91 (ref 0.9–1.1)
POTASSIUM SERPL-SCNC: 3.7 MMOL/L (ref 3.6–5)
PROT SERPL-MCNC: 6.7 G/DL (ref 6.3–8.2)
PROTHROMBIN TIME: 38.3 SECONDS (ref 12.1–14.5)
SODIUM SERPL-SCNC: 142 MMOL/L (ref 136–145)
T4 FREE SERPL-MCNC: 0.95 NG/DL (ref 0.71–1.85)
TSH SERPL-ACNC: 1.5 MCU/ML (ref 0.3–5)

## 2020-06-08 NOTE — PROGRESS NOTES
ANTICOAGULATION FOLLOW-UP CLINIC VISIT    Patient Name:  Reuben Padilla  Date:  6/8/2020  Contact Type:  Telephone    SUBJECTIVE:  Patient Findings     Positives:   Change in medications    Comments:   Pt has been on Actemra injections for a month and per literature this doesn't interact with Warfarin. Pt is upset with checking an INR in a week, but writer explained that his INR has jumped quite a bit since last INR and we need to monitor him a little closely due to this jump. Pt communicated understanding         Clinical Outcomes     Comments:   Pt has been on Actemra injections for a month and per literature this doesn't interact with Warfarin. Pt is upset with checking an INR in a week, but writer explained that his INR has jumped quite a bit since last INR and we need to monitor him a little closely due to this jump. Pt communicated understanding            OBJECTIVE    Recent labs: (last 7 days)     06/08/20   INR 3.91*       ASSESSMENT / PLAN  INR assessment SUPRA    Recheck INR In: 1 WEEK    INR Location Outside lab      Anticoagulation Summary  As of 6/8/2020    INR goal:   2.0-3.0   TTR:   84.1 % (2.7 mo)   INR used for dosing:   3.91! (6/8/2020)   Warfarin maintenance plan:   7.5 mg (5 mg x 1.5) every Tue; 5 mg (5 mg x 1) all other days   Full warfarin instructions:   6/8: Hold; Otherwise 7.5 mg every Tue; 5 mg all other days   Weekly warfarin total:   37.5 mg   Plan last modified:   Carola Box RN (4/7/2020)   Next INR check:   6/15/2020   Priority:   High   Target end date:   Indefinite    Indications    Acute pulmonary embolism (H) [I26.99]  Hx of pulmonary embolus [Z86.711]  Other acute pulmonary embolism without acute cor pulmonale (H) [I26.99]             Anticoagulation Episode Summary     INR check location:       Preferred lab:       Send INR reminders to:   [facility]    Comments:   [facility] (P)[phone] (F)[phone], Takes Warfarin in the am      Anticoagulation  Care Providers     Provider Role Specialty Phone number    Kadi Dee MD Referring Hematology [phone]            See the Encounter Report to view Anticoagulation Flowsheet and Dosing Calendar (Go to Encounters tab in chart review, and find the Anticoagulation Therapy Visit)    Spoke with patient. Gave them their lab results and new warfarin recommendation.  No changes in health, medication, or diet. No missed doses, no falls. No signs or symptoms of bleed or clotting.     Delfina Thomas RN

## 2020-06-16 ENCOUNTER — PATIENT OUTREACH (OUTPATIENT)
Dept: ONCOLOGY | Facility: CLINIC | Age: 60
End: 2020-06-16

## 2020-06-16 DIAGNOSIS — C15.5 CANCER OF DISTAL THIRD OF ESOPHAGUS (H): Primary | ICD-10-CM

## 2020-06-16 DIAGNOSIS — C78.7 MALIGNANT NEOPLASM METASTATIC TO LIVER (H): ICD-10-CM

## 2020-06-18 DIAGNOSIS — C15.5 CANCER OF DISTAL THIRD OF ESOPHAGUS (H): Primary | ICD-10-CM

## 2020-06-29 ENCOUNTER — ANTICOAGULATION THERAPY VISIT (OUTPATIENT)
Dept: ANTICOAGULATION | Facility: CLINIC | Age: 60
End: 2020-06-29

## 2020-06-29 ENCOUNTER — VIRTUAL VISIT (OUTPATIENT)
Dept: ONCOLOGY | Facility: CLINIC | Age: 60
End: 2020-06-29
Attending: PHYSICIAN ASSISTANT
Payer: MEDICARE

## 2020-06-29 DIAGNOSIS — I26.99 OTHER ACUTE PULMONARY EMBOLISM WITHOUT ACUTE COR PULMONALE (H): ICD-10-CM

## 2020-06-29 DIAGNOSIS — Z86.711 HX OF PULMONARY EMBOLUS: ICD-10-CM

## 2020-06-29 DIAGNOSIS — I26.99 ACUTE PULMONARY EMBOLISM (H): ICD-10-CM

## 2020-06-29 DIAGNOSIS — C15.5 CANCER OF DISTAL THIRD OF ESOPHAGUS (H): Primary | ICD-10-CM

## 2020-06-29 LAB — INR PPP: 3.35 (ref 0.9–1.1)

## 2020-06-29 PROCEDURE — 99214 OFFICE O/P EST MOD 30 MIN: CPT | Mod: 95 | Performed by: PHYSICIAN ASSISTANT

## 2020-06-29 PROCEDURE — 40001009 ZZH VIDEO/TELEPHONE VISIT; NO CHARGE

## 2020-06-29 RX ORDER — DIPHENHYDRAMINE HYDROCHLORIDE 50 MG/ML
50 INJECTION INTRAMUSCULAR; INTRAVENOUS
Status: CANCELLED
Start: 2020-08-10

## 2020-06-29 RX ORDER — LORAZEPAM 2 MG/ML
0.5 INJECTION INTRAMUSCULAR EVERY 4 HOURS PRN
Status: CANCELLED
Start: 2020-08-10

## 2020-06-29 RX ORDER — SODIUM CHLORIDE 9 MG/ML
1000 INJECTION, SOLUTION INTRAVENOUS CONTINUOUS PRN
Status: CANCELLED
Start: 2020-08-10

## 2020-06-29 RX ORDER — MEPERIDINE HYDROCHLORIDE 25 MG/ML
25 INJECTION INTRAMUSCULAR; INTRAVENOUS; SUBCUTANEOUS EVERY 30 MIN PRN
Status: CANCELLED | OUTPATIENT
Start: 2020-08-10

## 2020-06-29 RX ORDER — ALBUTEROL SULFATE 0.83 MG/ML
2.5 SOLUTION RESPIRATORY (INHALATION)
Status: CANCELLED | OUTPATIENT
Start: 2020-08-10

## 2020-06-29 RX ORDER — EPINEPHRINE 1 MG/ML
0.3 INJECTION, SOLUTION INTRAMUSCULAR; SUBCUTANEOUS EVERY 5 MIN PRN
Status: CANCELLED | OUTPATIENT
Start: 2020-08-10

## 2020-06-29 RX ORDER — HEPARIN SODIUM (PORCINE) LOCK FLUSH IV SOLN 100 UNIT/ML 100 UNIT/ML
500 SOLUTION INTRAVENOUS ONCE
Status: CANCELLED | OUTPATIENT
Start: 2020-08-10

## 2020-06-29 RX ORDER — EPINEPHRINE 0.3 MG/.3ML
0.3 INJECTION SUBCUTANEOUS EVERY 5 MIN PRN
Status: CANCELLED | OUTPATIENT
Start: 2020-08-10

## 2020-06-29 RX ORDER — ALBUTEROL SULFATE 90 UG/1
1-2 AEROSOL, METERED RESPIRATORY (INHALATION)
Status: CANCELLED
Start: 2020-08-10

## 2020-06-29 RX ORDER — METHYLPREDNISOLONE SODIUM SUCCINATE 125 MG/2ML
125 INJECTION, POWDER, LYOPHILIZED, FOR SOLUTION INTRAMUSCULAR; INTRAVENOUS
Status: CANCELLED
Start: 2020-08-10

## 2020-06-29 ASSESSMENT — PAIN SCALES - GENERAL: PAINLEVEL: MODERATE PAIN (4)

## 2020-06-29 NOTE — PROGRESS NOTES
"ANTICOAGULATION FOLLOW-UP CLINIC VISIT    Patient Name:  Reuben Padilla  Date:  6/29/2020  Contact Type:  Telephone    SUBJECTIVE:  Patient Findings     Comments:   Denies any changes. Pt wants to come back in three weeks, but writer explained that this is the 2nd INR that is above his goal range and we are changing his maintenance dose. Pt reports he will \"maybe\" follow writers recommendation.        Clinical Outcomes     Negatives:   Major bleeding event, Thromboembolic event, Anticoagulation-related hospital admission, Anticoagulation-related ED visit, Anticoagulation-related fatality    Comments:   Denies any changes. Pt wants to come back in three weeks, but writer explained that this is the 2nd INR that is above his goal range and we are changing his maintenance dose. Pt reports he will \"maybe\" follow writers recommendation.           OBJECTIVE    Recent labs: (last 7 days)     06/29/20   INR 3.35*       ASSESSMENT / PLAN  INR assessment SUPRA    Recheck INR In: 2 WEEKS    INR Location Outside lab      Anticoagulation Summary  As of 6/29/2020    INR goal:   2.0-3.0   TTR:   66.8 % (3.4 mo)   INR used for dosing:   3.35! (6/29/2020)   Warfarin maintenance plan:   5 mg (5 mg x 1) every day   Full warfarin instructions:   6/29: 2.5 mg; Otherwise 5 mg every day   Weekly warfarin total:   35 mg   Plan last modified:   Delfina Thomas RN (6/29/2020)   Next INR check:   7/13/2020   Priority:   High   Target end date:   Indefinite    Indications    Acute pulmonary embolism (H) [I26.99]  Hx of pulmonary embolus [Z86.711]  Other acute pulmonary embolism without acute cor pulmonale (H) [I26.99]             Anticoagulation Episode Summary     INR check location:       Preferred lab:       Send INR reminders to:   [facility]    Comments:   [facility] (P)[phone] (F)[phone], Takes Warfarin in the am      Anticoagulation Care Providers     Provider Role Specialty Phone number    Kadi Dee MD " Referring Hematology [phone]            See the Encounter Report to view Anticoagulation Flowsheet and Dosing Calendar (Go to Encounters tab in chart review, and find the Anticoagulation Therapy Visit)    Spoke with patient. Gave them their lab results and new warfarin recommendation.  No changes in health, medication, or diet. No missed doses, no falls. No signs or symptoms of bleed or clotting.     Delfina Thomas RN

## 2020-06-29 NOTE — PROGRESS NOTES
"Reuben Padilla is a 59 year old male who is being evaluated via a billable telephone visit.      The patient has been notified of following:     \"This telephone visit will be conducted via a call between you and your physician/provider. We have found that certain health care needs can be provided without the need for a physical exam.  This service lets us provide the care you need with a short phone conversation.  If a prescription is necessary we can send it directly to your pharmacy.  If lab work is needed we can place an order for that and you can then stop by our lab to have the test done at a later time.    Telephone visits are billed at different rates depending on your insurance coverage. During this emergency period, for some insurers they may be billed the same as an in-person visit.  Please reach out to your insurance provider with any questions.    If during the course of the call the physician/provider feels a telephone visit is not appropriate, you will not be charged for this service.\"    Patient has given verbal consent for Telephone visit?  Yes    What phone number would you like to be contacted at? [phone].    How would you like to obtain your AVS? MyChart    I have reviewed and updated the patient's allergies and medication list.    Concerns: No new concerns.  Refills: None needed.     No vitals taken at home.      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 His tumor is positive for cytokeratin 20, negative for P63 and CK7, and HER2 is negative. He started on FOLFOX (5FU/oxaliplatin) on 5/15/2017. He had a delay in treatment from 9/5-10/2/17 due to work related injury.     He had an " excellent response by imaging throughout the summer 2017.    He a mixed response by imaging in late fall 2017.    In January 2018, he was switched to taxol and cyramza - had slight progression and neuropathy and clinical trial became available.        In March 2018, he was started on the [employer] Match Clinical Trial with crizotinib.  (MET amplification) He remained on crizotinib from March - July 2018.     August 2018, he was switched back to taxol/cramza.  In October, he was switched to Keytruda (PD1 overexpressor).     In April 2019, his infusion was held for three weeks, and he was treated with prednisone and Enbrel for grade 3 arthralgias. Keytruda was resumed. He has now been tapered to 5 mg daily and is doing ok with stable disease and stable arthralgias.       INTERVAL HISTORY:   Levi has now been getting his Keytruda every three weeks up in [place].  He has been on a low dose of prednisone at 5 mg.  His arthralgias have been present, and wax and wane, without significantly impacting his daily functioning.  He has been taking Actemra as he has failed Humera and Embrel. He feels its been two months now, unsure if its helping or not. Some days pain is mild and other days its more.  On a bad day its 3-4/10.  He takes 2 oxycodone occasionally for pain.      He has had no issues with fevers or chills, no chest pain or shortness of breath, no nausea, vomiting, diarrhea or constipation.  No rash       He has no abdminal pain.  No n/v/d/c.  He has no dysphagia but he notes that when he eats something dry, like a sandwich, he has to drink more fluids to get it down.      He is on Coumadin now. No bruising or bleeding. His INR is being managed in [place]. He is using the Ambien for sleep, it often doesn't work.            ROS: 10 point ROS neg other than the symptoms noted above in the HPI.     PHYSICAL EXAMINATION  Voice is clear and strong. No audible stridor, wheezing, or respiratory distress. The remainder of PE  was deferred due to PHE.         Labs:   Done locally     IMPRESSION/PLAN:  1. Metastatic esophogeal adenocarcinoma. He has stable disease on Keytruda. He did develop grade 3 arthralgias and was placed on prednisone and Enbrel. The Enbrel didn't help and he is now off.  He has now been tapered to 5 mg daily and then started Actembra.  He is unsure if this helps, but he is no worse than he was, possible better.  He otherwise feels very stable and grateful he can be followed up at [place] for now and avoid the cities due to the pandemic.  He'll be back down here in August for his CT scan.         2. H/o PE 2018.   He has progressed through Xarelto in the past (didn't take it regularly).  He was then on lovenox but his insurance changed and this became unaffordable. He is now on Coumadin and labs drawn  at [facility] in [place] fax ([phone]).  He is monitored by our coumadin clinic.     3. Baseline neuropathy and got worse with chemo and is on gabapentin 600/300/600 with relief     4. Arthralgias. Oxycodone prn (once or twice a week currently) and Prednisone 5 mg and Actemra. His sx wax and wane  but overall his sx have been stable and he wishes to proceed. He will call us if worsens.      5. Insomnia. Ambien qhs  Phone call duration: 15 minutes    Nicole Sutherland PA-C

## 2020-07-02 RX ORDER — ALBUTEROL SULFATE 0.83 MG/ML
2.5 SOLUTION RESPIRATORY (INHALATION)
Status: CANCELLED | OUTPATIENT
Start: 2020-09-21

## 2020-07-02 RX ORDER — EPINEPHRINE 0.3 MG/.3ML
0.3 INJECTION SUBCUTANEOUS EVERY 5 MIN PRN
Status: CANCELLED | OUTPATIENT
Start: 2020-09-21

## 2020-07-02 RX ORDER — ALBUTEROL SULFATE 90 UG/1
1-2 AEROSOL, METERED RESPIRATORY (INHALATION)
Status: CANCELLED
Start: 2020-09-21

## 2020-07-02 RX ORDER — LORAZEPAM 2 MG/ML
0.5 INJECTION INTRAMUSCULAR EVERY 4 HOURS PRN
Status: CANCELLED
Start: 2020-09-21

## 2020-07-02 RX ORDER — HEPARIN SODIUM (PORCINE) LOCK FLUSH IV SOLN 100 UNIT/ML 100 UNIT/ML
500 SOLUTION INTRAVENOUS ONCE
Status: CANCELLED | OUTPATIENT
Start: 2020-09-21

## 2020-07-02 RX ORDER — METHYLPREDNISOLONE SODIUM SUCCINATE 125 MG/2ML
125 INJECTION, POWDER, LYOPHILIZED, FOR SOLUTION INTRAMUSCULAR; INTRAVENOUS
Status: CANCELLED
Start: 2020-09-21

## 2020-07-02 RX ORDER — EPINEPHRINE 1 MG/ML
0.3 INJECTION, SOLUTION INTRAMUSCULAR; SUBCUTANEOUS EVERY 5 MIN PRN
Status: CANCELLED | OUTPATIENT
Start: 2020-09-21

## 2020-07-02 RX ORDER — MEPERIDINE HYDROCHLORIDE 25 MG/ML
25 INJECTION INTRAMUSCULAR; INTRAVENOUS; SUBCUTANEOUS EVERY 30 MIN PRN
Status: CANCELLED | OUTPATIENT
Start: 2020-09-21

## 2020-07-02 RX ORDER — SODIUM CHLORIDE 9 MG/ML
1000 INJECTION, SOLUTION INTRAVENOUS CONTINUOUS PRN
Status: CANCELLED
Start: 2020-09-21

## 2020-07-02 RX ORDER — DIPHENHYDRAMINE HYDROCHLORIDE 50 MG/ML
50 INJECTION INTRAMUSCULAR; INTRAVENOUS
Status: CANCELLED
Start: 2020-09-21

## 2020-07-07 ENCOUNTER — PATIENT OUTREACH (OUTPATIENT)
Dept: ONCOLOGY | Facility: CLINIC | Age: 60
End: 2020-07-07

## 2020-07-07 ENCOUNTER — TELEPHONE (OUTPATIENT)
Dept: ONCOLOGY | Facility: CLINIC | Age: 60
End: 2020-07-07

## 2020-07-07 DIAGNOSIS — Q39.3: ICD-10-CM

## 2020-07-07 DIAGNOSIS — C78.7 MALIGNANT NEOPLASM METASTATIC TO LIVER (H): ICD-10-CM

## 2020-07-07 DIAGNOSIS — K22.2 ESOPHAGEAL OBSTRUCTION: ICD-10-CM

## 2020-07-07 DIAGNOSIS — Z51.12 ENCOUNTER FOR ANTINEOPLASTIC IMMUNOTHERAPY: ICD-10-CM

## 2020-07-07 DIAGNOSIS — M19.90 INFLAMMATORY ARTHRITIS: ICD-10-CM

## 2020-07-07 DIAGNOSIS — E66.01 MORBID OBESITY (H): ICD-10-CM

## 2020-07-07 DIAGNOSIS — C16.9 METASTASIS FROM GASTRIC CANCER (H): ICD-10-CM

## 2020-07-07 DIAGNOSIS — M25.50 MULTIPLE JOINT PAIN: ICD-10-CM

## 2020-07-07 DIAGNOSIS — C15.5 CANCER OF DISTAL THIRD OF ESOPHAGUS (H): Primary | ICD-10-CM

## 2020-07-07 DIAGNOSIS — C79.9 METASTASIS FROM GASTRIC CANCER (H): ICD-10-CM

## 2020-07-07 NOTE — TELEPHONE ENCOUNTER
Lab orders printed and faxed to [facility] @ [phone], per request of [facility].  Confirmation was received.    Piedad Smith CMA ([facility])

## 2020-07-07 NOTE — TELEPHONE ENCOUNTER
Oncology treatment plan for 07- faxed with recent office note to [facility] Huggins @ [phone]    Piedad Smith CMA ([facility])

## 2020-07-20 ENCOUNTER — ANTICOAGULATION THERAPY VISIT (OUTPATIENT)
Dept: ANTICOAGULATION | Facility: CLINIC | Age: 60
End: 2020-07-20

## 2020-07-20 ENCOUNTER — TRANSFERRED RECORDS (OUTPATIENT)
Dept: HEALTH INFORMATION MANAGEMENT | Facility: CLINIC | Age: 60
End: 2020-07-20

## 2020-07-20 DIAGNOSIS — Z86.711 HX OF PULMONARY EMBOLUS: ICD-10-CM

## 2020-07-20 DIAGNOSIS — I26.99 ACUTE PULMONARY EMBOLISM (H): ICD-10-CM

## 2020-07-20 DIAGNOSIS — I26.99 OTHER ACUTE PULMONARY EMBOLISM WITHOUT ACUTE COR PULMONALE (H): ICD-10-CM

## 2020-07-20 LAB
ALBUMIN SERPL-MCNC: 4.2 G/DL (ref 3.5–4.8)
ALBUMIN/GLOB SERPL: 1.8 {RATIO} (ref 1.1–2.2)
ALP SERPL-CCNC: 65 U/L (ref 40–150)
ALT SERPL-CCNC: 17 U/L (ref 0–55)
ANION GAP SERPL CALCULATED.3IONS-SCNC: 12 MMOL/L (ref 5–15)
AST SERPL-CCNC: 15 U/L (ref 5–34)
BASOPHILS # BLD AUTO: 0 10*3/UL (ref 0–0.3)
BASOPHILS NFR BLD AUTO: 0.7 % (ref 0–3)
BILIRUB SERPL-MCNC: 0.9 MG/DL (ref 0.3–1.2)
BUN SERPL-MCNC: 17 MG/DL (ref 9–25)
CALCIUM SERPL-MCNC: 8.6 MG/DL (ref 8.4–10.2)
CHLORIDE SERPLBLD-SCNC: 112 MMOL/L (ref 98–107)
CO2 SERPL-SCNC: 20 MMOL/L (ref 22–30)
CREAT SERPL-MCNC: 1.02 MG/DL (ref 0.7–1.3)
EOSINOPHIL # BLD AUTO: 0 10*3/UL (ref 0–0.5)
EOSINOPHIL NFR BLD AUTO: 1.3 % (ref 0–8)
ERYTHROCYTE [DISTWIDTH] IN BLOOD BY AUTOMATED COUNT: 14.1 % (ref 11–15)
GFR SERPL CREATININE-BSD FRML MDRD: >60 ML/MIN/1.73M2
GLOBULIN: 2.4 G/DL (ref 2.4–3.5)
GLUCOSE SERPL-MCNC: 104 MG/DL (ref 70–99)
HCT VFR BLD AUTO: 44.5 % (ref 37–51)
HEMOGLOBIN: 15.5 G/DL (ref 13–17)
INR PPP: 3.1 (ref 0.8–1.2)
INR PPP: 3.1 (ref 0.9–1.1)
LYMPHOCYTES # BLD AUTO: 1.4 10*3/UL (ref 0.9–3.6)
LYMPHOCYTES NFR BLD AUTO: 46.6 % (ref 10–41)
MCH RBC QN AUTO: 30.6 PG (ref 27–34)
MCHC RBC AUTO-ENTMCNC: 34.8 G/DL (ref 32–35)
MCV RBC AUTO: 87.8 FL (ref 82–99)
MONOCYTES # BLD AUTO: 0.3 10*3/UL (ref 0.3–0.9)
MONOCYTES NFR BLD AUTO: 9.5 % (ref 4–15)
NEUTROPHILS # BLD AUTO: 1.3 10*3/UL (ref 1.7–8.5)
NEUTROPHILS NFR BLD AUTO: 41.9 % (ref 40–80)
PLATELET # BLD AUTO: 119 10^9/L (ref 150–400)
PMV BLD: 10.1 FL (ref 6–11)
POTASSIUM SERPL-SCNC: 4 MMOL/L (ref 3.6–5)
PROT SERPL-MCNC: 6.6 G/DL (ref 6.3–8.2)
PT BLD: 32 SECONDS (ref 12.1–14.5)
RBC # BLD AUTO: 5.07 10^12/L (ref 4.2–5.9)
SODIUM SERPL-SCNC: 144 MMOL/L (ref 136–145)
T4 FREE SERPL-MCNC: 0.88 NG/DL (ref 0.71–1.85)
TSH SERPL-ACNC: 2.13 MCU/ML (ref 0.3–5)
WBC # BLD AUTO: 3.1 10^9/L (ref 3.4–10.7)

## 2020-07-20 NOTE — PROGRESS NOTES
ANTICOAGULATION FOLLOW-UP CLINIC VISIT    Patient Name:  Reuben Padilla  Date:  7/20/2020  Contact Type:  Telephone    SUBJECTIVE:  Patient Findings         Clinical Outcomes     Negatives:   Major bleeding event, Thromboembolic event, Anticoagulation-related hospital admission, Anticoagulation-related ED visit, Anticoagulation-related fatality           OBJECTIVE    Recent labs: (last 7 days)     07/20/20   INR 3.1*       ASSESSMENT / PLAN  INR assessment SUPRA    Recheck INR In: 3 WEEKS    INR Location Outside lab      Anticoagulation Summary  As of 7/20/2020    INR goal:   2.0-3.0   TTR:   55.4 % (4.1 mo)   INR used for dosing:   3.1! (7/20/2020)   Warfarin maintenance plan:   5 mg (5 mg x 1) every day   Full warfarin instructions:   5 mg every day   Weekly warfarin total:   35 mg   Plan last modified:   Delfina Thomas RN (6/29/2020)   Next INR check:   8/10/2020   Priority:   High   Target end date:   Indefinite    Indications    Acute pulmonary embolism (H) [I26.99]  Hx of pulmonary embolus [Z86.711]  Other acute pulmonary embolism without acute cor pulmonale (H) [I26.99]             Anticoagulation Episode Summary     INR check location:       Preferred lab:       Send INR reminders to:   [facility]    Comments:   [facility] (P)[phone] (F)[phone], Takes Warfarin in the am      Anticoagulation Care Providers     Provider Role Specialty Phone number    Kadi Dee MD Referring Hematology [phone]            See the Encounter Report to view Anticoagulation Flowsheet and Dosing Calendar (Go to Encounters tab in chart review, and find the Anticoagulation Therapy Visit)    Spoke with patient. Gave them their lab results and new warfarin recommendation.  No changes in health, medication, or diet. No missed doses, no falls. No signs or symptoms of bleed or clotting.      Mukul Gonzalez [facility]

## 2020-07-22 ENCOUNTER — MYC MEDICAL ADVICE (OUTPATIENT)
Dept: ONCOLOGY | Facility: CLINIC | Age: 60
End: 2020-07-22

## 2020-07-22 DIAGNOSIS — R53.83 FATIGUE: Primary | ICD-10-CM

## 2020-07-24 NOTE — TELEPHONE ENCOUNTER
"Called Pt to assess. Pt states he is able to only mow half his lawn whereas 2-3 week ago could mow the whole thing, go on walks of around a mile.  Pt doesn't get SOB per se, but becomes fatigued across the entire body. No dizziness or seeing stars. This has been the case for the past 2-3 weeks, Pt has no other Sx and is afebrile.    Per Jonn \"OK, I think since it is not changing and no SOB we don't need to do anything today but we should set him up for local labs   -CBC   -CMP   -TSH with T4 reflex   -Cortisol (needs be done at 8AM)     And then have him do virtual visit with BERENICE next week once labs done. Go to ED if anything gets worse or develops SOB over the weekend.     Thanks!\"    Shelly has no availability next week, nor Does Dr Dee. Contacted Mansi per demographic message of what to do if no preferred providers available.    Per Mansi Dee DOES have an opening on Monday. Messaged scheduling, Mansi placed a hold.  "

## 2020-07-26 NOTE — PROGRESS NOTES
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 His tumor is positive for cytokeratin 20, negative for P63 and CK7, and HER2 is negative. He started on FOLFOX (5FU/oxaliplatin) on 5/15/2017. He had a delay in treatment from 9/5-10/2/17 due to work related injury.     He had an excellent response by imaging throughout the summer 2017.    He a mixed response by imaging in late fall 2017.    In January 2018, he was switched to taxol and cyramza - had slight progression and neuropathy and clinical trial became available.        In March 2018, he was started on the [facility] Match Clinical Trial with crizotinib.  (MET amplification) He remained on crizotinib from March - July 2018.     August 2018, he was switched back to taxol/cramza.  In October, he was switched to Keytruda (PD1 overexpressor).     In April 2019, his infusion was held for three weeks, and he was treated with prednisone and Enbrel for grade 3 arthralgias. Keytruda was resumed. He has now been tapered to 5 mg daily and is doing ok with stable disease and stable arthralgias.       INTERVAL HISTORY:   Levi has now been getting his Keytruda every three weeks up in [place].  His last dose was 7/23/20. He has been on a low dose of prednisone at 5 mg.  His arthralgias have been a lot better now, and states he has not used oxycodone in more than 3-4 weeks now. He continues on Tocilizumab once every week. He says this has helped him a lot. His main symptom currently is excessive fatigue and also difficulty with falling and staying asleep at night. He uses ambien 10 mg PM. He is interested in further tapering and coming off of prednisone, as he believes it is preventing him from weight reduction. He  has been trying diet control with no benefit and believes it to be due to prednisone. He is not sure what would be a cause for his insomnia.      He has had no issues with fevers or chills, no chest pain or shortness of breath, no nausea, vomiting, diarrhea or constipation.  No rash       He has no abdminal pain.  No n/v/d/c.     He is on Coumadin now. No bruising or bleeding. His INR is being managed in [place].          ROS: 10 point ROS neg other than the symptoms noted above in the HPI.     PHYSICAL EXAMINATION  /83   Pulse 82   Temp 96.1  F (35.6  C) (Tympanic)   Resp 14   Wt (!) 166.2 kg (366 lb 8 oz)   SpO2 96%   BMI 42.23 kg/m    366 lbs 7.99 oz  Alert, oriented, no distress.  Moist mucosae.  Supple neck, no lymphadenopathy.  Clear AE bilaterally.  Port clean  Regular heart sounds  Obese abdomen, no murmurs.  Extensive telangiectasias bilateral LE  No edema.   No gross focal deficits.      Labs:   Lab Results   Component Value Date    WBC 2.8 07/27/2020     Lab Results   Component Value Date    RBC 5.27 07/27/2020     Lab Results   Component Value Date    HGB 16.2 07/27/2020     Lab Results   Component Value Date    HCT 47.3 07/27/2020     No components found for: MCT  Lab Results   Component Value Date    MCV 90 07/27/2020     Lab Results   Component Value Date    MCH 30.7 07/27/2020     Lab Results   Component Value Date    MCHC 34.2 07/27/2020     Lab Results   Component Value Date    RDW 13.2 07/27/2020     Lab Results   Component Value Date     07/27/2020     Last Comprehensive Metabolic Panel:  Sodium   Date Value Ref Range Status   07/27/2020 140 133 - 144 mmol/L Final     Potassium   Date Value Ref Range Status   07/27/2020 3.7 3.4 - 5.3 mmol/L Final     Chloride   Date Value Ref Range Status   07/27/2020 110 (H) 94 - 109 mmol/L Final     Carbon Dioxide   Date Value Ref Range Status   07/27/2020 25 20 - 32 mmol/L Final     Anion Gap   Date Value Ref Range Status   07/27/2020 5 3  - 14 mmol/L Final     Glucose   Date Value Ref Range Status   07/27/2020 108 (H) 70 - 99 mg/dL Final     Urea Nitrogen   Date Value Ref Range Status   07/27/2020 17 7 - 30 mg/dL Final     Creatinine   Date Value Ref Range Status   07/27/2020 0.95 0.66 - 1.25 mg/dL Final     GFR Estimate   Date Value Ref Range Status   07/27/2020 86 >60 mL/min/[1.73_m2] Final     Comment:     Non  GFR Calc  Starting 12/18/2018, serum creatinine based estimated GFR (eGFR) will be   calculated using the Chronic Kidney Disease Epidemiology Collaboration   (CKD-EPI) equation.       Calcium   Date Value Ref Range Status   07/27/2020 8.3 (L) 8.5 - 10.1 mg/dL Final     Bilirubin Total   Date Value Ref Range Status   07/27/2020 1.1 0.2 - 1.3 mg/dL Final     Alkaline Phosphatase   Date Value Ref Range Status   07/27/2020 73 40 - 150 U/L Final     ALT   Date Value Ref Range Status   07/27/2020 26 0 - 70 U/L Final     AST   Date Value Ref Range Status   07/27/2020 17 0 - 45 U/L Final     TSH, Cortisol WNL     IMPRESSION/PLAN:  1. Metastatic esophogeal adenocarcinoma. He has stable disease on Keytruda. He did develop grade 3 arthralgias and was placed on prednisone and Enbrel. The Enbrel didn't help and he is now off.  He has now been tapered to 5 mg daily and then started Actembra.  He feels much improved with tocilizumab, interested in further tapering off Prednisone. Will discussed with Dr Dumas (rheumatology) regarding this.  He'll be back down here in August for his CT scan. Will continue pembrolizumab and will consider switching to every 6 weeks dosing.       2. H/o PE 2018.   He has progressed through Xarelto in the past (didn't take it regularly).  He was then on lovenox but his insurance changed and this became unaffordable. He is now on Coumadin and labs drawn  at [facility] in [facility] ([phone]).  He is monitored by our coumadin clinic.     3. Baseline neuropathy and got worse with chemo  and is on gabapentin 600/300/600 with relief.     4. Arthralgias. He has now been tapered to 5 mg daily and then started Actembra.  He feels much improved with tocilizumab, interested in further tapering off Prednisone. Will discussed with Dr Dumas (rheumatology) regarding this.He has not needed oxycodone in more than 3-4 weeks.      5. Severe fatigue, Insomnia. TSH, Cortisol normal. Unclear etiology. Insomnia could be contributing to fatigue. He has difficulty both falling and staying asleep. Discussed formal sleep study, but he has been hesitant to this, but agreed to getting tested on explaining. Will change ambien to Ambien CR to see if this helps.     Care plan discussed with Dr Dee,  Kevin Cedillo  [facility] Onc fellow  [phone]    Addendum:  Pt was seen and evaluated by me with Dr Cedillo.  I reviewed the above with Dr Cedillo and physically examined Mr. Padilla myself.      I reviewed with him that he has had stable disease on keytruda.  It would be reasonable to space out his infusions to every six weeks based on the new approvals.  He would like to obtain his next CT scan prior to making this change.    We reviewed his fatigue and arthralgias.  I'd like to discuss tapering his prednisone with Dr Dumas.      Endocrine labs are normal.    Kadi Dee MD

## 2020-07-27 ENCOUNTER — ONCOLOGY VISIT (OUTPATIENT)
Dept: ONCOLOGY | Facility: CLINIC | Age: 60
End: 2020-07-27
Attending: INTERNAL MEDICINE
Payer: MEDICARE

## 2020-07-27 ENCOUNTER — ANTICOAGULATION THERAPY VISIT (OUTPATIENT)
Dept: ANTICOAGULATION | Facility: CLINIC | Age: 60
End: 2020-07-27

## 2020-07-27 VITALS
WEIGHT: 315 LBS | TEMPERATURE: 96.1 F | BODY MASS INDEX: 42.23 KG/M2 | RESPIRATION RATE: 14 BRPM | HEART RATE: 82 BPM | OXYGEN SATURATION: 96 % | SYSTOLIC BLOOD PRESSURE: 133 MMHG | DIASTOLIC BLOOD PRESSURE: 83 MMHG

## 2020-07-27 VITALS
DIASTOLIC BLOOD PRESSURE: 83 MMHG | SYSTOLIC BLOOD PRESSURE: 133 MMHG | RESPIRATION RATE: 14 BRPM | TEMPERATURE: 96.1 F | OXYGEN SATURATION: 96 % | HEART RATE: 82 BPM | WEIGHT: 315 LBS | BODY MASS INDEX: 42.23 KG/M2

## 2020-07-27 DIAGNOSIS — I26.99 OTHER ACUTE PULMONARY EMBOLISM WITHOUT ACUTE COR PULMONALE (H): ICD-10-CM

## 2020-07-27 DIAGNOSIS — C16.9 METASTASIS FROM GASTRIC CANCER (H): ICD-10-CM

## 2020-07-27 DIAGNOSIS — R53.83 FATIGUE: ICD-10-CM

## 2020-07-27 DIAGNOSIS — Z86.711 HX OF PULMONARY EMBOLUS: ICD-10-CM

## 2020-07-27 DIAGNOSIS — M25.50 MULTIPLE JOINT PAIN: ICD-10-CM

## 2020-07-27 DIAGNOSIS — G47.09 OTHER INSOMNIA: Primary | ICD-10-CM

## 2020-07-27 DIAGNOSIS — I26.99 ACUTE PULMONARY EMBOLISM (H): ICD-10-CM

## 2020-07-27 DIAGNOSIS — E66.01 MORBID OBESITY (H): ICD-10-CM

## 2020-07-27 DIAGNOSIS — C15.5 CANCER OF DISTAL THIRD OF ESOPHAGUS (H): ICD-10-CM

## 2020-07-27 DIAGNOSIS — C78.7 MALIGNANT NEOPLASM METASTATIC TO LIVER (H): ICD-10-CM

## 2020-07-27 DIAGNOSIS — C79.9 METASTASIS FROM GASTRIC CANCER (H): ICD-10-CM

## 2020-07-27 DIAGNOSIS — M19.90 INFLAMMATORY ARTHRITIS: ICD-10-CM

## 2020-07-27 LAB
ALBUMIN SERPL-MCNC: 3.9 G/DL (ref 3.4–5)
ALP SERPL-CCNC: 73 U/L (ref 40–150)
ALT SERPL W P-5'-P-CCNC: 26 U/L (ref 0–70)
ANION GAP SERPL CALCULATED.3IONS-SCNC: 5 MMOL/L (ref 3–14)
AST SERPL W P-5'-P-CCNC: 17 U/L (ref 0–45)
BASOPHILS # BLD AUTO: 0 10E9/L (ref 0–0.2)
BASOPHILS NFR BLD AUTO: 1.1 %
BILIRUB SERPL-MCNC: 1.1 MG/DL (ref 0.2–1.3)
BUN SERPL-MCNC: 17 MG/DL (ref 7–30)
CALCIUM SERPL-MCNC: 8.3 MG/DL (ref 8.5–10.1)
CHLORIDE SERPL-SCNC: 110 MMOL/L (ref 94–109)
CO2 SERPL-SCNC: 25 MMOL/L (ref 20–32)
CORTIS SERPL-MCNC: 10 UG/DL (ref 4–22)
CREAT SERPL-MCNC: 0.95 MG/DL (ref 0.66–1.25)
DIFFERENTIAL METHOD BLD: ABNORMAL
EOSINOPHIL # BLD AUTO: 0 10E9/L (ref 0–0.7)
EOSINOPHIL NFR BLD AUTO: 1.4 %
ERYTHROCYTE [DISTWIDTH] IN BLOOD BY AUTOMATED COUNT: 13.2 % (ref 10–15)
GFR SERPL CREATININE-BSD FRML MDRD: 86 ML/MIN/{1.73_M2}
GLUCOSE SERPL-MCNC: 108 MG/DL (ref 70–99)
HCT VFR BLD AUTO: 47.3 % (ref 40–53)
HGB BLD-MCNC: 16.2 G/DL (ref 13.3–17.7)
IMM GRANULOCYTES # BLD: 0 10E9/L (ref 0–0.4)
IMM GRANULOCYTES NFR BLD: 0.4 %
INR PPP: 2.78 (ref 0.86–1.14)
LYMPHOCYTES # BLD AUTO: 1.1 10E9/L (ref 0.8–5.3)
LYMPHOCYTES NFR BLD AUTO: 38.8 %
MCH RBC QN AUTO: 30.7 PG (ref 26.5–33)
MCHC RBC AUTO-ENTMCNC: 34.2 G/DL (ref 31.5–36.5)
MCV RBC AUTO: 90 FL (ref 78–100)
MONOCYTES # BLD AUTO: 0.3 10E9/L (ref 0–1.3)
MONOCYTES NFR BLD AUTO: 9.6 %
NEUTROPHILS # BLD AUTO: 1.4 10E9/L (ref 1.6–8.3)
NEUTROPHILS NFR BLD AUTO: 48.7 %
NRBC # BLD AUTO: 0 10*3/UL
NRBC BLD AUTO-RTO: 0 /100
PLATELET # BLD AUTO: 116 10E9/L (ref 150–450)
POTASSIUM SERPL-SCNC: 3.7 MMOL/L (ref 3.4–5.3)
PROT SERPL-MCNC: 7 G/DL (ref 6.8–8.8)
RBC # BLD AUTO: 5.27 10E12/L (ref 4.4–5.9)
SODIUM SERPL-SCNC: 140 MMOL/L (ref 133–144)
TSH SERPL DL<=0.005 MIU/L-ACNC: 2.43 MU/L (ref 0.4–4)
WBC # BLD AUTO: 2.8 10E9/L (ref 4–11)

## 2020-07-27 PROCEDURE — 84443 ASSAY THYROID STIM HORMONE: CPT | Performed by: INTERNAL MEDICINE

## 2020-07-27 PROCEDURE — 85025 COMPLETE CBC W/AUTO DIFF WBC: CPT | Performed by: INTERNAL MEDICINE

## 2020-07-27 PROCEDURE — 36591 DRAW BLOOD OFF VENOUS DEVICE: CPT

## 2020-07-27 PROCEDURE — 25000128 H RX IP 250 OP 636: Performed by: INTERNAL MEDICINE

## 2020-07-27 PROCEDURE — 82533 TOTAL CORTISOL: CPT | Performed by: INTERNAL MEDICINE

## 2020-07-27 PROCEDURE — 99215 OFFICE O/P EST HI 40 MIN: CPT | Mod: GC | Performed by: INTERNAL MEDICINE

## 2020-07-27 PROCEDURE — 85610 PROTHROMBIN TIME: CPT | Performed by: INTERNAL MEDICINE

## 2020-07-27 PROCEDURE — 80053 COMPREHEN METABOLIC PANEL: CPT | Performed by: INTERNAL MEDICINE

## 2020-07-27 PROCEDURE — G0463 HOSPITAL OUTPT CLINIC VISIT: HCPCS | Mod: ZF

## 2020-07-27 RX ORDER — ZOLPIDEM TARTRATE 12.5 MG/1
12.5 TABLET, FILM COATED, EXTENDED RELEASE ORAL
Qty: 30 TABLET | Refills: 3 | Status: SHIPPED | OUTPATIENT
Start: 2020-07-27 | End: 2020-10-16 | Stop reason: DRUGHIGH

## 2020-07-27 RX ORDER — HEPARIN SODIUM (PORCINE) LOCK FLUSH IV SOLN 100 UNIT/ML 100 UNIT/ML
5 SOLUTION INTRAVENOUS DAILY PRN
Status: DISCONTINUED | OUTPATIENT
Start: 2020-07-27 | End: 2020-08-04 | Stop reason: HOSPADM

## 2020-07-27 RX ADMIN — Medication 5 ML: at 08:12

## 2020-07-27 ASSESSMENT — PAIN SCALES - GENERAL: PAINLEVEL: NO PAIN (0)

## 2020-07-27 NOTE — PROGRESS NOTES
"ANTICOAGULATION FOLLOW-UP CLINIC VISIT    Patient Name:  Reuben Padilla  Date:  2020  Contact Type:  Telephone    SUBJECTIVE:  Patient Findings     Positives:   Change in health (Patient is feeling \"fatigued\".)    Comments:   Spoke to Levi.  He has an appt with Oncology this afternoon.  Per patient the team is looking at Cortisol levels.  Will keep next appt for INR and scan on 8/10.        Clinical Outcomes     Comments:   Spoke to Levi.  He has an appt with Oncology this afternoon.  Per patient the team is looking at Cortisol levels.  Will keep next appt for INR and scan on 8/10.           OBJECTIVE    Recent labs: (last 7 days)     20  0817   INR 2.78*       ASSESSMENT / PLAN  INR assessment THER    Recheck INR In: 2 WEEKS    INR Location Clinic      Anticoagulation Summary  As of 2020    INR goal:   2.0-3.0   TTR:   56.1 % (4.3 mo)   INR used for dosin.78 (2020)   Warfarin maintenance plan:   5 mg (5 mg x 1) every day   Full warfarin instructions:   5 mg every day   Weekly warfarin total:   35 mg   No change documented:   Carlos Multani RN   Plan last modified:   Delfina Thomas, RN (2020)   Next INR check:   8/10/2020   Priority:   High   Target end date:   Indefinite    Indications    Acute pulmonary embolism (H) [I26.99]  Hx of pulmonary embolus [Z86.711]  Other acute pulmonary embolism without acute cor pulmonale (H) [I26.99]             Anticoagulation Episode Summary     INR check location:       Preferred lab:       Send INR reminders to:   [facility]    Comments:   [facility] (P)[phone] (F)[phone], Takes Warfarin in the am      Anticoagulation Care Providers     Provider Role Specialty Phone number    Kadi Dee MD Referring Hematology [phone]            See the Encounter Report to view Anticoagulation Flowsheet and Dosing Calendar (Go to Encounters tab in chart review, and find the Anticoagulation Therapy Visit)    INR/CFX/F2 " "RESULT: INR result is 2.78    ASSESSMENT:Spoke with patient. Gave them their lab results.  Levi is feeling \"fatigued\". No missed doses, no falls. No signs or symptoms of bleed or clotting.  Will update the ACC if any changes are made at 3:30 appt today.  DOSING ADJUSTMENT:5mg daily    NEXT INR/FACTOR X OR FACTOR II:8/10 at next appt.    PROTOCOL FOLLOWED:goal 2-3    Carlos Multani RN                 "

## 2020-07-27 NOTE — NURSING NOTE
"Oncology Rooming Note    July 27, 2020 3:40 PM   Reuben Padilla is a 59 year old male who presents for:    Chief Complaint   Patient presents with     Oncology Clinic Visit     ESOPHAGUS CANCER      Initial Vitals: /83   Pulse 82   Temp 96.1  F (35.6  C) (Tympanic)   Resp 14   Wt (!) 166.2 kg (366 lb 8 oz)   SpO2 96%   BMI 42.23 kg/m   Estimated body mass index is 42.23 kg/m  as calculated from the following:    Height as of 2/20/20: 1.984 m (6' 6.11\").    Weight as of this encounter: 166.2 kg (366 lb 8 oz). Body surface area is 3.03 meters squared.  No Pain (0) Comment: Data Unavailable   No LMP for male patient.  Allergies reviewed: Yes  Medications reviewed: Yes    Medications: Medication refills not needed today.  Pharmacy name entered into EPIC:    [id number] - [place], [place] - [address] [facility] - [place], [place] - [address] [facility] [address] [facility] - [facility] [address]    Clinical concerns: Patient complains of fatigued. He gets easily tired with walking the dog or mowing the grass.   Dr Dee was notified.      Suha Huerta            "

## 2020-07-27 NOTE — LETTER
7/27/2020         RE: Reuben Padilla  [address] [facility] [address]        Dear Colleague,    Thank you for referring your patient, Reuben Padilla, to the [facility]. Please see a copy of my visit note below.    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 His tumor is positive for cytokeratin 20, negative for P63 and CK7, and HER2 is negative. He started on FOLFOX (5FU/oxaliplatin) on 5/15/2017. He had a delay in treatment from 9/5-10/2/17 due to work related injury.     He had an excellent response by imaging throughout the summer 2017.    He a mixed response by imaging in late fall 2017.    In January 2018, he was switched to taxol and cyramza - had slight progression and neuropathy and clinical trial became available.        In March 2018, he was started on the [facility] Match Clinical Trial with crizotinib.  (MET amplification) He remained on crizotinib from March - July 2018.     August 2018, he was switched back to taxol/cramza.  In October, he was switched to Keytruda (PD1 overexpressor).     In April 2019, his infusion was held for three weeks, and he was treated with prednisone and Enbrel for grade 3 arthralgias. Keytruda was resumed. He has now been tapered to 5 mg daily and is doing ok with stable disease and stable arthralgias.       INTERVAL HISTORY:   Levi has now been getting his Keytruda every three weeks up in [place].  His last dose was 7/23/20. He has been on a low dose of prednisone at 5 mg.  His arthralgias have been a lot better now, and states he has not used oxycodone in more than 3-4 weeks now. He continues on Tocilizumab once every week. He says this has helped him a lot. His main  symptom currently is excessive fatigue and also difficulty with falling and staying asleep at night. He uses ambien 10 mg PM. He is interested in further tapering and coming off of prednisone, as he believes it is preventing him from weight reduction. He has been trying diet control with no benefit and believes it to be due to prednisone. He is not sure what would be a cause for his insomnia.      He has had no issues with fevers or chills, no chest pain or shortness of breath, no nausea, vomiting, diarrhea or constipation.  No rash       He has no abdminal pain.  No n/v/d/c.     He is on Coumadin now. No bruising or bleeding. His INR is being managed in [place].          ROS: 10 point ROS neg other than the symptoms noted above in the HPI.     PHYSICAL EXAMINATION  /83   Pulse 82   Temp 96.1  F (35.6  C) (Tympanic)   Resp 14   Wt (!) 166.2 kg (366 lb 8 oz)   SpO2 96%   BMI 42.23 kg/m    366 lbs 7.99 oz  Alert, oriented, no distress.  Moist mucosae.  Supple neck, no lymphadenopathy.  Clear AE bilaterally.  Port clean  Regular heart sounds  Obese abdomen, no murmurs.  Extensive telangiectasias bilateral LE  No edema.   No gross focal deficits.      Labs:   Lab Results   Component Value Date    WBC 2.8 07/27/2020     Lab Results   Component Value Date    RBC 5.27 07/27/2020     Lab Results   Component Value Date    HGB 16.2 07/27/2020     Lab Results   Component Value Date    HCT 47.3 07/27/2020     No components found for: MCT  Lab Results   Component Value Date    MCV 90 07/27/2020     Lab Results   Component Value Date    MCH 30.7 07/27/2020     Lab Results   Component Value Date    MCHC 34.2 07/27/2020     Lab Results   Component Value Date    RDW 13.2 07/27/2020     Lab Results   Component Value Date     07/27/2020     Last Comprehensive Metabolic Panel:  Sodium   Date Value Ref Range Status   07/27/2020 140 133 - 144 mmol/L Final     Potassium   Date Value Ref Range Status   07/27/2020 3.7  3.4 - 5.3 mmol/L Final     Chloride   Date Value Ref Range Status   07/27/2020 110 (H) 94 - 109 mmol/L Final     Carbon Dioxide   Date Value Ref Range Status   07/27/2020 25 20 - 32 mmol/L Final     Anion Gap   Date Value Ref Range Status   07/27/2020 5 3 - 14 mmol/L Final     Glucose   Date Value Ref Range Status   07/27/2020 108 (H) 70 - 99 mg/dL Final     Urea Nitrogen   Date Value Ref Range Status   07/27/2020 17 7 - 30 mg/dL Final     Creatinine   Date Value Ref Range Status   07/27/2020 0.95 0.66 - 1.25 mg/dL Final     GFR Estimate   Date Value Ref Range Status   07/27/2020 86 >60 mL/min/[1.73_m2] Final     Comment:     Non  GFR Calc  Starting 12/18/2018, serum creatinine based estimated GFR (eGFR) will be   calculated using the Chronic Kidney Disease Epidemiology Collaboration   (CKD-EPI) equation.       Calcium   Date Value Ref Range Status   07/27/2020 8.3 (L) 8.5 - 10.1 mg/dL Final     Bilirubin Total   Date Value Ref Range Status   07/27/2020 1.1 0.2 - 1.3 mg/dL Final     Alkaline Phosphatase   Date Value Ref Range Status   07/27/2020 73 40 - 150 U/L Final     ALT   Date Value Ref Range Status   07/27/2020 26 0 - 70 U/L Final     AST   Date Value Ref Range Status   07/27/2020 17 0 - 45 U/L Final     TSH, Cortisol WNL     IMPRESSION/PLAN:  1. Metastatic esophogeal adenocarcinoma. He has stable disease on Keytruda. He did develop grade 3 arthralgias and was placed on prednisone and Enbrel. The Enbrel didn't help and he is now off.  He has now been tapered to 5 mg daily and then started Actembra.  He feels much improved with tocilizumab, interested in further tapering off Prednisone. Will discussed with Dr Dumas (rheumatology) regarding this.  He'll be back down here in August for his CT scan. Will continue pembrolizumab and will consider switching to every 6 weeks dosing.       2. H/o PE 2018.   He has progressed through Xarelto in the past (didn't take it regularly).  He was then on  lovenox but his insurance changed and this became unaffordable. He is now on Coumadin and labs drawn  at [facility] in [facility] ([phone]).  He is monitored by our coumadin clinic.     3. Baseline neuropathy and got worse with chemo and is on gabapentin 600/300/600 with relief.     4. Arthralgias. He has now been tapered to 5 mg daily and then started Actembra.  He feels much improved with tocilizumab, interested in further tapering off Prednisone. Will discussed with Dr Dumas (rheumatology) regarding this.He has not needed oxycodone in more than 3-4 weeks.      5. Severe fatigue, Insomnia. TSH, Cortisol normal. Unclear etiology. Insomnia could be contributing to fatigue. He has difficulty both falling and staying asleep. Discussed formal sleep study, but he has been hesitant to this, but agreed to getting tested on explaining. Will change ambien to Ambien CR to see if this helps.     Care plan discussed with Kevin Bruce  [facility] Onc fellow  [phone]    Addendum:  Pt was seen and evaluated by me with Dr Cedillo.  I reviewed the above with Dr Cedillo and physically examined Mr. Padilla myself.      I reviewed with him that he has had stable disease on keytruda.  It would be reasonable to space out his infusions to every six weeks based on the new approvals.  He would like to obtain his next CT scan prior to making this change.    We reviewed his fatigue and arthralgias.  I'd like to discuss tapering his prednisone with Dr Dumas.      Endocrine labs are normal.    Kadi Dee MD

## 2020-07-30 ENCOUNTER — TELEPHONE (OUTPATIENT)
Dept: RHEUMATOLOGY | Facility: CLINIC | Age: 60
End: 2020-07-30

## 2020-07-30 DIAGNOSIS — M06.00 SERONEGATIVE RHEUMATOID ARTHRITIS (H): Primary | ICD-10-CM

## 2020-07-30 DIAGNOSIS — Z51.81 ENCOUNTER FOR MEDICATION MONITORING: ICD-10-CM

## 2020-07-30 NOTE — TELEPHONE ENCOUNTER
----- Message from Michelle Singer MD sent at 7/30/2020  9:40 AM CDT -----  Sweetie,    Could you please call him?    Seems like actemra is helping and he wants to taper his pred; however has new fatigue, there is a drop in WBC (very small in plt), suspect all to be SE of actemra.  Discussed with Dr. Dee.  Recommend:    1-Reduce frequency of actemra from qwk to q2wk    2-Stay on pred 5 mg every day for now, will taper if no increased joint pain by actemra dose change    3-CBC diff repeat on 8/10    He has an upcoming scan, if stable cancer, might go down on cancer drug dose which could help joint pin.    Thank  you

## 2020-07-30 NOTE — TELEPHONE ENCOUNTER
Called and spoke with pt, discussed plan with him. He understands, will plan to go to every other week on his Actemra. Pt did take Actemra today, will plan next dose on 8/6/2020.      Pt had no further questions about plan.    Sweetie Hernandez RN  Rheumatology Clinic

## 2020-08-10 ENCOUNTER — APPOINTMENT (OUTPATIENT)
Dept: LAB | Facility: CLINIC | Age: 60
End: 2020-08-10
Attending: INTERNAL MEDICINE
Payer: MEDICARE

## 2020-08-10 ENCOUNTER — ANCILLARY PROCEDURE (OUTPATIENT)
Dept: CT IMAGING | Facility: CLINIC | Age: 60
End: 2020-08-10
Attending: INTERNAL MEDICINE
Payer: MEDICARE

## 2020-08-10 ENCOUNTER — ONCOLOGY VISIT (OUTPATIENT)
Dept: ONCOLOGY | Facility: CLINIC | Age: 60
End: 2020-08-10
Attending: INTERNAL MEDICINE
Payer: MEDICARE

## 2020-08-10 ENCOUNTER — ANTICOAGULATION THERAPY VISIT (OUTPATIENT)
Dept: ANTICOAGULATION | Facility: CLINIC | Age: 60
End: 2020-08-10

## 2020-08-10 VITALS
SYSTOLIC BLOOD PRESSURE: 121 MMHG | TEMPERATURE: 97.6 F | DIASTOLIC BLOOD PRESSURE: 81 MMHG | BODY MASS INDEX: 42.35 KG/M2 | OXYGEN SATURATION: 98 % | HEART RATE: 99 BPM | RESPIRATION RATE: 18 BRPM | WEIGHT: 315 LBS

## 2020-08-10 DIAGNOSIS — Z86.711 HX OF PULMONARY EMBOLUS: ICD-10-CM

## 2020-08-10 DIAGNOSIS — C15.5 CANCER OF DISTAL THIRD OF ESOPHAGUS (H): Primary | ICD-10-CM

## 2020-08-10 DIAGNOSIS — I26.99 ACUTE PULMONARY EMBOLISM (H): ICD-10-CM

## 2020-08-10 DIAGNOSIS — I26.99 OTHER ACUTE PULMONARY EMBOLISM WITHOUT ACUTE COR PULMONALE (H): ICD-10-CM

## 2020-08-10 DIAGNOSIS — C15.5 CANCER OF DISTAL THIRD OF ESOPHAGUS (H): ICD-10-CM

## 2020-08-10 DIAGNOSIS — T45.1X5A CHEMOTHERAPY-INDUCED NEUTROPENIA (H): ICD-10-CM

## 2020-08-10 DIAGNOSIS — D70.1 CHEMOTHERAPY-INDUCED NEUTROPENIA (H): ICD-10-CM

## 2020-08-10 LAB
ALBUMIN SERPL-MCNC: 4 G/DL (ref 3.4–5)
ALP SERPL-CCNC: 72 U/L (ref 40–150)
ALT SERPL W P-5'-P-CCNC: 34 U/L (ref 0–70)
ANION GAP SERPL CALCULATED.3IONS-SCNC: 6 MMOL/L (ref 3–14)
AST SERPL W P-5'-P-CCNC: 16 U/L (ref 0–45)
BASOPHILS # BLD AUTO: 0 10E9/L (ref 0–0.2)
BASOPHILS NFR BLD AUTO: 0.5 %
BILIRUB SERPL-MCNC: 1 MG/DL (ref 0.2–1.3)
BUN SERPL-MCNC: 19 MG/DL (ref 7–30)
CALCIUM SERPL-MCNC: 8.7 MG/DL (ref 8.5–10.1)
CHLORIDE SERPL-SCNC: 113 MMOL/L (ref 94–109)
CO2 SERPL-SCNC: 24 MMOL/L (ref 20–32)
CREAT SERPL-MCNC: 1 MG/DL (ref 0.66–1.25)
DIFFERENTIAL METHOD BLD: ABNORMAL
EOSINOPHIL # BLD AUTO: 0.1 10E9/L (ref 0–0.7)
EOSINOPHIL NFR BLD AUTO: 1.3 %
ERYTHROCYTE [DISTWIDTH] IN BLOOD BY AUTOMATED COUNT: 13.5 % (ref 10–15)
GFR SERPL CREATININE-BSD FRML MDRD: 82 ML/MIN/{1.73_M2}
GLUCOSE SERPL-MCNC: 106 MG/DL (ref 70–99)
HCT VFR BLD AUTO: 47.3 % (ref 40–53)
HGB BLD-MCNC: 16.4 G/DL (ref 13.3–17.7)
IMM GRANULOCYTES # BLD: 0 10E9/L (ref 0–0.4)
IMM GRANULOCYTES NFR BLD: 0.3 %
INR PPP: 2.96 (ref 0.86–1.14)
LYMPHOCYTES # BLD AUTO: 1.5 10E9/L (ref 0.8–5.3)
LYMPHOCYTES NFR BLD AUTO: 38.4 %
MCH RBC QN AUTO: 31.3 PG (ref 26.5–33)
MCHC RBC AUTO-ENTMCNC: 34.7 G/DL (ref 31.5–36.5)
MCV RBC AUTO: 90 FL (ref 78–100)
MONOCYTES # BLD AUTO: 0.4 10E9/L (ref 0–1.3)
MONOCYTES NFR BLD AUTO: 9.2 %
NEUTROPHILS # BLD AUTO: 2 10E9/L (ref 1.6–8.3)
NEUTROPHILS NFR BLD AUTO: 50.3 %
NRBC # BLD AUTO: 0 10*3/UL
NRBC BLD AUTO-RTO: 0 /100
PLATELET # BLD AUTO: 124 10E9/L (ref 150–450)
POTASSIUM SERPL-SCNC: 4 MMOL/L (ref 3.4–5.3)
PROT SERPL-MCNC: 7.2 G/DL (ref 6.8–8.8)
RBC # BLD AUTO: 5.24 10E12/L (ref 4.4–5.9)
SODIUM SERPL-SCNC: 143 MMOL/L (ref 133–144)
TSH SERPL DL<=0.005 MIU/L-ACNC: 3.02 MU/L (ref 0.4–4)
WBC # BLD AUTO: 3.9 10E9/L (ref 4–11)

## 2020-08-10 PROCEDURE — 25000128 H RX IP 250 OP 636: Mod: ZF | Performed by: INTERNAL MEDICINE

## 2020-08-10 PROCEDURE — 85025 COMPLETE CBC W/AUTO DIFF WBC: CPT | Performed by: PHYSICIAN ASSISTANT

## 2020-08-10 PROCEDURE — 25800030 ZZH RX IP 258 OP 636: Mod: ZF | Performed by: PHYSICIAN ASSISTANT

## 2020-08-10 PROCEDURE — 84443 ASSAY THYROID STIM HORMONE: CPT | Performed by: PHYSICIAN ASSISTANT

## 2020-08-10 PROCEDURE — 96413 CHEMO IV INFUSION 1 HR: CPT

## 2020-08-10 PROCEDURE — 99215 OFFICE O/P EST HI 40 MIN: CPT | Mod: ZP | Performed by: INTERNAL MEDICINE

## 2020-08-10 PROCEDURE — 25000128 H RX IP 250 OP 636: Mod: ZF | Performed by: PHYSICIAN ASSISTANT

## 2020-08-10 PROCEDURE — 85610 PROTHROMBIN TIME: CPT | Performed by: PHYSICIAN ASSISTANT

## 2020-08-10 PROCEDURE — G0463 HOSPITAL OUTPT CLINIC VISIT: HCPCS | Mod: ZF

## 2020-08-10 PROCEDURE — 80053 COMPREHEN METABOLIC PANEL: CPT | Performed by: PHYSICIAN ASSISTANT

## 2020-08-10 RX ORDER — HEPARIN SODIUM (PORCINE) LOCK FLUSH IV SOLN 100 UNIT/ML 100 UNIT/ML
500 SOLUTION INTRAVENOUS ONCE
Status: COMPLETED | OUTPATIENT
Start: 2020-08-10 | End: 2020-08-10

## 2020-08-10 RX ORDER — HEPARIN SODIUM (PORCINE) LOCK FLUSH IV SOLN 100 UNIT/ML 100 UNIT/ML
5 SOLUTION INTRAVENOUS ONCE
Status: COMPLETED | OUTPATIENT
Start: 2020-08-10 | End: 2020-08-10

## 2020-08-10 RX ORDER — IOPAMIDOL 755 MG/ML
135 INJECTION, SOLUTION INTRAVASCULAR ONCE
Status: COMPLETED | OUTPATIENT
Start: 2020-08-10 | End: 2020-08-10

## 2020-08-10 RX ADMIN — SODIUM CHLORIDE 250 ML: 9 INJECTION, SOLUTION INTRAVENOUS at 10:24

## 2020-08-10 RX ADMIN — SODIUM CHLORIDE 200 MG: 9 INJECTION, SOLUTION INTRAVENOUS at 10:25

## 2020-08-10 RX ADMIN — Medication 5 ML: at 06:51

## 2020-08-10 RX ADMIN — Medication 500 UNITS: at 11:00

## 2020-08-10 RX ADMIN — IOPAMIDOL 135 ML: 755 INJECTION, SOLUTION INTRAVASCULAR at 07:23

## 2020-08-10 ASSESSMENT — PAIN SCALES - GENERAL: PAINLEVEL: NO PAIN (0)

## 2020-08-10 NOTE — PROGRESS NOTES
"Oncology Rooming Note    August 10, 2020 8:26 AM   Reuben Padilla is a 59 year old male who presents for:    Chief Complaint   Patient presents with     Port Draw      labs drawn via port by RN in lab; vitals     Oncology Clinic Visit     Return; Malignant Neoplasm Metastatic to Liver     Initial Vitals: /81 (BP Location: Left arm, Patient Position: Chair, Cuff Size: Adult Large)   Pulse 99   Temp 97.6  F (36.4  C) (Oral)   Resp 18   Wt (!) 166.7 kg (367 lb 8 oz)   SpO2 98%   BMI 42.35 kg/m   Estimated body mass index is 42.35 kg/m  as calculated from the following:    Height as of 2/20/20: 1.984 m (6' 6.11\").    Weight as of this encounter: 166.7 kg (367 lb 8 oz). Body surface area is 3.03 meters squared.  No Pain (0) Comment: Data Unavailable   No LMP for male patient.  Allergies reviewed: Yes  Medications reviewed: Yes    Medications: Medication refills not needed today.  Pharmacy name entered into EPIC:    [id number] - [place], [place] - [address] [facility] - [place], [place] - [address] [facility] MAIL DELIVERY - [facility] [address]    Clinical concerns: No new concerns.        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 His tumor is positive for cytokeratin 20, negative for P63 and CK7, and HER2 is negative. He started on FOLFOX (5FU/oxaliplatin) on 5/15/2017. He had a delay in treatment from 9/5-10/2/17 due to work related injury.     He had an excellent response by imaging throughout the summer 2017.    He a mixed response by imaging in late fall 2017.    In January 2018, he was switched to taxol and cyramza - had slight progression and neuropathy and " clinical trial became available.        In March 2018, he was started on the [employer] Match Clinical Trial with crizotinib.  (MET amplification) He remained on crizotinib from March - July 2018.     August 2018, he was switched back to taxol/cramza.  In October, he was switched to Keytruda (PD1 overexpressor).     In April 2019, his infusion was held for three weeks, and he was treated with prednisone and Enbrel for grade 3 arthralgias. Keytruda was resumed. He has now been tapered to 5 mg daily and is doing ok with stable disease and stable arthralgias.       INTERVAL HISTORY:   Levi has now been getting his Keytruda every three weeks. He has been on a low dose of prednisone at 5 mg.  His arthralgias have been a lot better now, and states he has not used oxycodone in more than 3-4 weeks now. He continues on Tocilizumab once every week. Given his fatigue and drop in counts, Dr Singer suggested we reduce this frequency to every other week; this just began and he is not sure whether it is has helped or not.      He has had no issues with fevers or chills, no chest pain or shortness of breath, no nausea, vomiting, diarrhea or constipation.  No rash       He has no abdminal pain.  No dysphagia          ROS: 10 point ROS neg other than the symptoms noted above in the HPI.     PHYSICAL EXAMINATION  /81 (BP Location: Left arm, Patient Position: Chair, Cuff Size: Adult Large)   Pulse 99   Temp 97.6  F (36.4  C) (Oral)   Resp 18   Wt (!) 166.7 kg (367 lb 8 oz)   SpO2 98%   BMI 42.35 kg/m    367 lbs 8 oz  Alert, oriented, no distress.  Moist mucosae.  Supple neck, no lymphadenopathy.  Clear AE bilaterally.  Port clean  Regular heart sounds  Obese abdomen, no murmurs.  Extensive telangiectasias bilateral LE  No edema.   No gross focal deficits.      Labs:   Lab Results   Component Value Date    WBC 2.8 07/27/2020     Lab Results   Component Value Date    RBC 5.27 07/27/2020     Lab Results   Component Value Date    HGB  16.2 07/27/2020     Lab Results   Component Value Date    HCT 47.3 07/27/2020     No components found for: MCT  Lab Results   Component Value Date    MCV 90 07/27/2020     Lab Results   Component Value Date    MCH 30.7 07/27/2020     Lab Results   Component Value Date    MCHC 34.2 07/27/2020     Lab Results   Component Value Date    RDW 13.2 07/27/2020     Lab Results   Component Value Date     07/27/2020     Last Comprehensive Metabolic Panel:  Sodium   Date Value Ref Range Status   08/10/2020 143 133 - 144 mmol/L Final     Potassium   Date Value Ref Range Status   08/10/2020 4.0 3.4 - 5.3 mmol/L Final     Chloride   Date Value Ref Range Status   08/10/2020 113 (H) 94 - 109 mmol/L Final     Carbon Dioxide   Date Value Ref Range Status   08/10/2020 24 20 - 32 mmol/L Final     Anion Gap   Date Value Ref Range Status   08/10/2020 6 3 - 14 mmol/L Final     Glucose   Date Value Ref Range Status   08/10/2020 106 (H) 70 - 99 mg/dL Final     Urea Nitrogen   Date Value Ref Range Status   08/10/2020 19 7 - 30 mg/dL Final     Creatinine   Date Value Ref Range Status   08/10/2020 1.00 0.66 - 1.25 mg/dL Final     GFR Estimate   Date Value Ref Range Status   08/10/2020 82 >60 mL/min/[1.73_m2] Final     Comment:     Non  GFR Calc  Starting 12/18/2018, serum creatinine based estimated GFR (eGFR) will be   calculated using the Chronic Kidney Disease Epidemiology Collaboration   (CKD-EPI) equation.       Calcium   Date Value Ref Range Status   08/10/2020 8.7 8.5 - 10.1 mg/dL Final     Bilirubin Total   Date Value Ref Range Status   08/10/2020 1.0 0.2 - 1.3 mg/dL Final     Alkaline Phosphatase   Date Value Ref Range Status   08/10/2020 72 40 - 150 U/L Final     ALT   Date Value Ref Range Status   08/10/2020 34 0 - 70 U/L Final     AST   Date Value Ref Range Status   08/10/2020 16 0 - 45 U/L Final     TSH, Cortisol WNL     Reviewed CT scan with radiology demonstrating stable.    IMPRESSION/PLAN:  1. Metastatic  esophogeal adenocarcinoma. He has stable disease on Keytruda. He did develop grade 3 arthralgias and was placed on prednisone and Actembra (previously Enbrel).       I reviewed with him that he has had stable disease on keytruda.  It would be reasonable to space out his infusions to every six weeks based on the new approvals.  We reviewed infusion in 6 weeks and 12 weeks.  Imaging in 12 weeks to ensure stability.    We reviewed his fatigue and arthralgias.  Dr Singer suggested some medication changes; these were just instituted.     Endocrine labs are normal.      2. H/o PE 2018.   He has progressed through Xarelto in the past (didn't take it regularly).  He was then on lovenox but his insurance changed and this became unaffordable. He is now on Coumadin and labs drawn  at [facility] in [place] fax ([phone]).  He is monitored by our coumadin clinic.     3. Baseline neuropathy and got worse with chemo and is on gabapentin 600/300/600 with relief.               Kadi Dee MD

## 2020-08-10 NOTE — NURSING NOTE
Chief Complaint   Patient presents with     Port Draw      labs drawn via port by RN in lab; vitals     /81 (BP Location: Left arm, Patient Position: Chair, Cuff Size: Adult Large)   Pulse 99   Temp 97.6  F (36.4  C) (Oral)   Resp 18   Wt (!) 166.7 kg (367 lb 8 oz)   SpO2 98%   BMI 42.35 kg/m      Port accessed by RN in lab. Labs collected and sent. Pt tolerated well. Line flushed with Normal Saline & Heparin.     Daniela Fisher RN

## 2020-08-10 NOTE — LETTER
"    8/10/2020         RE: Reuben Padilla  [address] [facility]  [place] [address]        Dear Colleague,    Thank you for referring your patient, Reuben Padilla, to the [facility]. Please see a copy of my visit note below.    Oncology Rooming Note    August 10, 2020 8:26 AM   Reuben Padilla is a 59 year old male who presents for:    Chief Complaint   Patient presents with     Port Draw      labs drawn via port by RN in lab; vitals     Oncology Clinic Visit     Return; Malignant Neoplasm Metastatic to Liver     Initial Vitals: /81 (BP Location: Left arm, Patient Position: Chair, Cuff Size: Adult Large)   Pulse 99   Temp 97.6  F (36.4  C) (Oral)   Resp 18   Wt (!) 166.7 kg (367 lb 8 oz)   SpO2 98%   BMI 42.35 kg/m   Estimated body mass index is 42.35 kg/m  as calculated from the following:    Height as of 2/20/20: 1.984 m (6' 6.11\").    Weight as of this encounter: 166.7 kg (367 lb 8 oz). Body surface area is 3.03 meters squared.  No Pain (0) Comment: Data Unavailable   No LMP for male patient.  Allergies reviewed: Yes  Medications reviewed: Yes    Medications: Medication refills not needed today.  Pharmacy name entered into EPIC:    [id number] - [place], [place] - [address] [facility] - [place], [place] - [address] [facility] MAIL DELIVERY - [place] [id number]    Clinical concerns: No new concerns.        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 His tumor is positive for cytokeratin 20, negative for P63 and CK7, and HER2 is negative. He started on FOLFOX (5FU/oxaliplatin) on 5/15/2017. He had a delay in treatment from 9/5-10/2/17 due to " work related injury.     He had an excellent response by imaging throughout the summer 2017.    He a mixed response by imaging in late fall 2017.    In January 2018, he was switched to taxol and cyramza - had slight progression and neuropathy and clinical trial became available.        In March 2018, he was started on the [facility] Match Clinical Trial with crizotinib.  (MET amplification) He remained on crizotinib from March - July 2018.     August 2018, he was switched back to taxol/cramza.  In October, he was switched to Keytruda (PD1 overexpressor).     In April 2019, his infusion was held for three weeks, and he was treated with prednisone and Enbrel for grade 3 arthralgias. Keytruda was resumed. He has now been tapered to 5 mg daily and is doing ok with stable disease and stable arthralgias.       INTERVAL HISTORY:   Levi has now been getting his Keytruda every three weeks. He has been on a low dose of prednisone at 5 mg.  His arthralgias have been a lot better now, and states he has not used oxycodone in more than 3-4 weeks now. He continues on Tocilizumab once every week. Given his fatigue and drop in counts, Dr Singer suggested we reduce this frequency to every other week; this just began and he is not sure whether it is has helped or not.      He has had no issues with fevers or chills, no chest pain or shortness of breath, no nausea, vomiting, diarrhea or constipation.  No rash       He has no abdminal pain.  No dysphagia          ROS: 10 point ROS neg other than the symptoms noted above in the HPI.     PHYSICAL EXAMINATION  /81 (BP Location: Left arm, Patient Position: Chair, Cuff Size: Adult Large)   Pulse 99   Temp 97.6  F (36.4  C) (Oral)   Resp 18   Wt (!) 166.7 kg (367 lb 8 oz)   SpO2 98%   BMI 42.35 kg/m    367 lbs 8 oz  Alert, oriented, no distress.  Moist mucosae.  Supple neck, no lymphadenopathy.  Clear AE bilaterally.  Port clean  Regular heart sounds  Obese abdomen, no  murmurs.  Extensive telangiectasias bilateral LE  No edema.   No gross focal deficits.      Labs:   Lab Results   Component Value Date    WBC 2.8 07/27/2020     Lab Results   Component Value Date    RBC 5.27 07/27/2020     Lab Results   Component Value Date    HGB 16.2 07/27/2020     Lab Results   Component Value Date    HCT 47.3 07/27/2020     No components found for: MCT  Lab Results   Component Value Date    MCV 90 07/27/2020     Lab Results   Component Value Date    MCH 30.7 07/27/2020     Lab Results   Component Value Date    MCHC 34.2 07/27/2020     Lab Results   Component Value Date    RDW 13.2 07/27/2020     Lab Results   Component Value Date     07/27/2020     Last Comprehensive Metabolic Panel:  Sodium   Date Value Ref Range Status   08/10/2020 143 133 - 144 mmol/L Final     Potassium   Date Value Ref Range Status   08/10/2020 4.0 3.4 - 5.3 mmol/L Final     Chloride   Date Value Ref Range Status   08/10/2020 113 (H) 94 - 109 mmol/L Final     Carbon Dioxide   Date Value Ref Range Status   08/10/2020 24 20 - 32 mmol/L Final     Anion Gap   Date Value Ref Range Status   08/10/2020 6 3 - 14 mmol/L Final     Glucose   Date Value Ref Range Status   08/10/2020 106 (H) 70 - 99 mg/dL Final     Urea Nitrogen   Date Value Ref Range Status   08/10/2020 19 7 - 30 mg/dL Final     Creatinine   Date Value Ref Range Status   08/10/2020 1.00 0.66 - 1.25 mg/dL Final     GFR Estimate   Date Value Ref Range Status   08/10/2020 82 >60 mL/min/[1.73_m2] Final     Comment:     Non  GFR Calc  Starting 12/18/2018, serum creatinine based estimated GFR (eGFR) will be   calculated using the Chronic Kidney Disease Epidemiology Collaboration   (CKD-EPI) equation.       Calcium   Date Value Ref Range Status   08/10/2020 8.7 8.5 - 10.1 mg/dL Final     Bilirubin Total   Date Value Ref Range Status   08/10/2020 1.0 0.2 - 1.3 mg/dL Final     Alkaline Phosphatase   Date Value Ref Range Status   08/10/2020 72 40 - 150 U/L  Final     ALT   Date Value Ref Range Status   08/10/2020 34 0 - 70 U/L Final     AST   Date Value Ref Range Status   08/10/2020 16 0 - 45 U/L Final     TSH, Cortisol WNL     Reviewed CT scan with radiology demonstrating stable.    IMPRESSION/PLAN:  1. Metastatic esophogeal adenocarcinoma. He has stable disease on Keytruda. He did develop grade 3 arthralgias and was placed on prednisone and Actembra (previously Enbrel).       I reviewed with him that he has had stable disease on keytruda.  It would be reasonable to space out his infusions to every six weeks based on the new approvals.  We reviewed infusion in 6 weeks and 12 weeks.  Imaging in 12 weeks to ensure stability.    We reviewed his fatigue and arthralgias.  Dr Singer suggested some medication changes; these were just instituted.     Endocrine labs are normal.      2. H/o PE 2018.   He has progressed through Xarelto in the past (didn't take it regularly).  He was then on lovenox but his insurance changed and this became unaffordable. He is now on Coumadin and labs drawn  at [facility] in [place] fax ([phone]).  He is monitored by our coumadin clinic.     3. Baseline neuropathy and got worse with chemo and is on gabapentin 600/300/600 with relief.               Kadi Dee MD

## 2020-08-10 NOTE — PROGRESS NOTES
ANTICOAGULATION FOLLOW-UP CLINIC VISIT    Patient Name:  Reuben Padilla  Date:  8/10/2020  Contact Type:  Telephone    SUBJECTIVE:         OBJECTIVE    Recent labs: (last 7 days)     08/10/20  0659   INR 2.96*       ASSESSMENT / PLAN  INR assessment THER    Recheck INR In: 6 WEEKS    INR Location Clinic      Anticoagulation Summary  As of 8/10/2020    INR goal:   2.0-3.0   TTR:   60.4 % (4.8 mo)   INR used for dosin.96 (8/10/2020)   Warfarin maintenance plan:   5 mg (5 mg x 1) every day   Full warfarin instructions:   5 mg every day   Weekly warfarin total:   35 mg   Plan last modified:   Delfina Thomas RN (2020)   Next INR check:   2020   Priority:   High   Target end date:   Indefinite    Indications    Acute pulmonary embolism (H) [I26.99]  Hx of pulmonary embolus [Z86.711]  Other acute pulmonary embolism without acute cor pulmonale (H) [I26.99]             Anticoagulation Episode Summary     INR check location:       Preferred lab:       Send INR reminders to:   [facility]    Comments:   [facility] (P)[phone] (F)[phone], Takes Warfarin in the am      Anticoagulation Care Providers     Provider Role Specialty Phone number    Kadi Dee MD Referring Hematology [phone]            See the Encounter Report to view Anticoagulation Flowsheet and Dosing Calendar (Go to Encounters tab in chart review, and find the Anticoagulation Therapy Visit)  Spoke with patient.  He really wanted to wait and get next INR with his other labs in 6 weeks.    Alley Alarcon RN

## 2020-08-10 NOTE — PROGRESS NOTES
Infusion Nursing Note:  Reuben Padilla presents today for C28 D1 Keytruda.    Patient seen by provider today: Yes: Dr. Dee   present during visit today: Not Applicable.    Note: N/A.    Intravenous Access:  Implanted Port.    Treatment Conditions:  Lab Results   Component Value Date    HGB 16.4 08/10/2020     Lab Results   Component Value Date    WBC 3.9 08/10/2020      Lab Results   Component Value Date    ANEU 2.0 08/10/2020     Lab Results   Component Value Date     08/10/2020      Lab Results   Component Value Date     08/10/2020                   Lab Results   Component Value Date    POTASSIUM 4.0 08/10/2020           Lab Results   Component Value Date    MAG 1.9 11/07/2018            Lab Results   Component Value Date    CR 1.00 08/10/2020                   Lab Results   Component Value Date    NIDHI 8.7 08/10/2020                Lab Results   Component Value Date    BILITOTAL 1.0 08/10/2020           Lab Results   Component Value Date    ALBUMIN 4.0 08/10/2020                    Lab Results   Component Value Date    ALT 34 08/10/2020           Lab Results   Component Value Date    AST 16 08/10/2020       Results reviewed, labs MET treatment parameters, ok to proceed with treatment.      Post Infusion Assessment:  Patient tolerated infusion without incident.  Blood return noted pre and post infusion.  Site patent and intact, free from redness, edema or discomfort.  No evidence of extravasations.  Access discontinued per protocol.       Discharge Plan:   Patient declined prescription refills.  Discharge instructions reviewed with: Patient.  Patient and/or family verbalized understanding of discharge instructions and all questions answered.  AVS to patient via [employer].  Patient will return 9/21 next appointment.  Patient discharged in stable condition accompanied by: self.  Departure Mode: Ambulatory.    Carley Robles RN

## 2020-08-28 ENCOUNTER — VIRTUAL VISIT (OUTPATIENT)
Dept: ONCOLOGY | Facility: CLINIC | Age: 60
End: 2020-08-28
Payer: MEDICARE

## 2020-08-28 DIAGNOSIS — E66.01 MORBID OBESITY (H): ICD-10-CM

## 2020-08-28 DIAGNOSIS — C15.5 CANCER OF DISTAL THIRD OF ESOPHAGUS (H): Primary | ICD-10-CM

## 2020-08-28 PROCEDURE — 97802 MEDICAL NUTRITION INDIV IN: CPT | Mod: 95 | Performed by: DIETITIAN, REGISTERED

## 2020-08-28 NOTE — LETTER
"    8/28/2020         RE: Reuben Padilla  [address] [place] [id number]        Dear Colleague,    Thank you for referring your patient, Reuben Padilla, to the [facility]. Please see a copy of my visit note below.    Reuben Padilla is a 59 year old male who is being evaluated via a billable telephone visit.      The patient has been notified of following:     \"This telephone visit will be conducted via a call between you and your physician/provider. We have found that certain health care needs can be provided without the need for a physical exam.  This service lets us provide the care you need with a short phone conversation.  If a prescription is necessary we can send it directly to your pharmacy.  If lab work is needed we can place an order for that and you can then stop by our lab to have the test done at a later time.    Telephone visits are billed at different rates depending on your insurance coverage. During this emergency period, for some insurers they may be billed the same as an in-person visit.  Please reach out to your insurance provider with any questions.    If during the course of the call the physician/provider feels a telephone visit is not appropriate, you will not be charged for this service.\"    Patient has given verbal consent for Telephone visit?  Yes        CLINICAL NUTRITION SERVICES - ASSESSMENT NOTE    Reuben Padilla 59 year old referred for MNT related to esophagus cancer    Time Spent: 60 minutes  Visit Type: phone  Referring Physician: Leila    NUTRITION HISTORY  Factors affecting nutrition intake include:decreased appetite  Current diet: General  Current appetite/intake: good  Chemotherapy: Jaki Sims requested to talk with RD via oncology distress screening.   He expresses his desire to safely lose weight and learn about healthy food choices.   He admits to eating whatever he wants as he does not know how long he will live.     He tells me " "that he has been learning about Atkins diet, keto diet and intermittent fasting. He tells me he is interested in trying this.   He started Keto diet just yesterday.  He admits to starting to feel like he has the 'keto flu'.    He is not entirely sure this is the right diet for him as he has a hard time eating that much fat.    He eats a lot of eggs and sausage.  He has also been eating some fruits that have lower carbs.  He has gotten rid of most unhealthy junk foods from his home other than the 1/3 gallon of ice cream he wants to use up.     ANTHROPOMETRICS  Height: 6'6\"  Weight: 367 lb/166kg  BMI: 42  Weight Status:  Obesity Grade III BMI >40  IBW: 214 lb  % IBW: 171%  Weight History:  No changes; he does have a history of 60 lb wt loss ~2 years ago from dysphagia. He regained it back as his swallowing improved.   Wt Readings from Last 6 Encounters:   08/10/20 (!) 166.7 kg (367 lb 8 oz)   07/27/20 (!) 166.2 kg (366 lb 8 oz)   07/27/20 (!) 166.2 kg (366 lb 8 oz)   05/18/20 (!) 169.5 kg (373 lb 11.2 oz)   03/16/20 (!) 166 kg (365 lb 14.4 oz)   02/20/20 (!) 165.5 kg (364 lb 12.8 oz)     Dosing Weight: 114kg    Medications/vitamins/minerals/herbals:   Reviewed    Labs:  Labs reviewed    ASSESSED NUTRITION NEEDS:  Estimated Energy Needs: 2300 kcals (20-25 Kcal/Kg)  Justification: obese  Estimated Protein Needs: 140 grams protein (1.2 g pro/Kg)  Justification: maintenance  Estimated Fluid Needs: 2300  mL   Justification: maintenance    MALNUTRITION:  % Weight Loss:  None noted  % Intake:  No decreased intake noted  Subcutaneous Fat Loss:  None observed  Muscle Loss:  None observed  Fluid Retention:  None noted    Malnutrition Diagnosis: Patient does not meet two of the above criteria necessary for diagnosing malnutrition    NUTRITION DIAGNOSIS:  Obesity related to self-monitoring deficit, excessive caloric intake AEB diet recall BMI >40    INTERVENTIONS  Provided written & verbal education:   Discussed dietary and " "behavioral modifications to promote weight loss (portion control, meal consistency, measuring foods, 9\"portion plate method, fiber sources, protein sources, eating pace, exercise and avoidance of disordered eating patterns (skipping meals).      Reviewed pros and cons to Atkins, keto diets and intermittent fasting.  Encouraged him to track calories and protein, aiming for at least 2200kcal and 140 grams protein /day for safe weight loss.  Discouraged Keto diet as it tends to be quite deficient in nutrition.  Cautioned against intermittent fasting (short windows of eating it only 4 hours) as this can reduce BG    Provided pt with corresponding education materials/handouts on:  Sources of Protein    Pt verbalize understanding of materials provided during consult.   Patient Understanding: Excellent  Expected Compliance: Excellent     Goals  1.  Aim 2300kcal /day for desired wt loss  2.  Start tracking daily intake on [employer] or [place]/website of choice  3. Weight loss (0.5-1.0 lb /week desirable)      Follow-Up Plans: Pt has RD contact information for questions.        Roxie Gonzalez RDN, LDN          Again, thank you for allowing me to participate in the care of your patient.        Sincerely,        Roxie Gonzalez RD    "

## 2020-09-21 ENCOUNTER — ONCOLOGY VISIT (OUTPATIENT)
Dept: ONCOLOGY | Facility: CLINIC | Age: 60
End: 2020-09-21
Attending: PHYSICIAN ASSISTANT
Payer: MEDICARE

## 2020-09-21 ENCOUNTER — APPOINTMENT (OUTPATIENT)
Dept: LAB | Facility: CLINIC | Age: 60
End: 2020-09-21
Attending: INTERNAL MEDICINE
Payer: MEDICARE

## 2020-09-21 ENCOUNTER — ANTICOAGULATION THERAPY VISIT (OUTPATIENT)
Dept: ANTICOAGULATION | Facility: CLINIC | Age: 60
End: 2020-09-21

## 2020-09-21 VITALS
DIASTOLIC BLOOD PRESSURE: 89 MMHG | HEART RATE: 102 BPM | WEIGHT: 315 LBS | TEMPERATURE: 97.8 F | SYSTOLIC BLOOD PRESSURE: 122 MMHG | OXYGEN SATURATION: 97 % | BODY MASS INDEX: 40.51 KG/M2 | RESPIRATION RATE: 16 BRPM

## 2020-09-21 DIAGNOSIS — Z86.711 HX OF PULMONARY EMBOLUS: ICD-10-CM

## 2020-09-21 DIAGNOSIS — M19.90 INFLAMMATORY ARTHRITIS: ICD-10-CM

## 2020-09-21 DIAGNOSIS — T45.1X5A CHEMOTHERAPY-INDUCED NEUTROPENIA (H): ICD-10-CM

## 2020-09-21 DIAGNOSIS — D70.1 CHEMOTHERAPY-INDUCED NEUTROPENIA (H): ICD-10-CM

## 2020-09-21 DIAGNOSIS — I26.99 OTHER ACUTE PULMONARY EMBOLISM WITHOUT ACUTE COR PULMONALE (H): ICD-10-CM

## 2020-09-21 DIAGNOSIS — C15.5 CANCER OF DISTAL THIRD OF ESOPHAGUS (H): Primary | ICD-10-CM

## 2020-09-21 DIAGNOSIS — I26.99 ACUTE PULMONARY EMBOLISM (H): ICD-10-CM

## 2020-09-21 LAB
ALBUMIN SERPL-MCNC: 3.9 G/DL (ref 3.4–5)
ALP SERPL-CCNC: 68 U/L (ref 40–150)
ALT SERPL W P-5'-P-CCNC: 30 U/L (ref 0–70)
ANION GAP SERPL CALCULATED.3IONS-SCNC: 12 MMOL/L (ref 3–14)
AST SERPL W P-5'-P-CCNC: 16 U/L (ref 0–45)
BASOPHILS # BLD AUTO: 0 10E9/L (ref 0–0.2)
BASOPHILS NFR BLD AUTO: 0.8 %
BILIRUB SERPL-MCNC: 1.3 MG/DL (ref 0.2–1.3)
BUN SERPL-MCNC: 20 MG/DL (ref 7–30)
CALCIUM SERPL-MCNC: 8.9 MG/DL (ref 8.5–10.1)
CHLORIDE SERPL-SCNC: 106 MMOL/L (ref 94–109)
CO2 SERPL-SCNC: 22 MMOL/L (ref 20–32)
CREAT SERPL-MCNC: 1.02 MG/DL (ref 0.66–1.25)
DIFFERENTIAL METHOD BLD: ABNORMAL
EOSINOPHIL # BLD AUTO: 0 10E9/L (ref 0–0.7)
EOSINOPHIL NFR BLD AUTO: 1.6 %
ERYTHROCYTE [DISTWIDTH] IN BLOOD BY AUTOMATED COUNT: 14.1 % (ref 10–15)
GFR SERPL CREATININE-BSD FRML MDRD: 79 ML/MIN/{1.73_M2}
GLUCOSE SERPL-MCNC: 93 MG/DL (ref 70–99)
HCT VFR BLD AUTO: 44.4 % (ref 40–53)
HGB BLD-MCNC: 15.3 G/DL (ref 13.3–17.7)
IMM GRANULOCYTES # BLD: 0 10E9/L (ref 0–0.4)
IMM GRANULOCYTES NFR BLD: 0.4 %
INR PPP: 1.6 (ref 0.86–1.14)
LYMPHOCYTES # BLD AUTO: 0.9 10E9/L (ref 0.8–5.3)
LYMPHOCYTES NFR BLD AUTO: 36.7 %
MCH RBC QN AUTO: 31.2 PG (ref 26.5–33)
MCHC RBC AUTO-ENTMCNC: 34.5 G/DL (ref 31.5–36.5)
MCV RBC AUTO: 91 FL (ref 78–100)
MONOCYTES # BLD AUTO: 0.3 10E9/L (ref 0–1.3)
MONOCYTES NFR BLD AUTO: 10.2 %
NEUTROPHILS # BLD AUTO: 1.2 10E9/L (ref 1.6–8.3)
NEUTROPHILS NFR BLD AUTO: 50.3 %
NRBC # BLD AUTO: 0 10*3/UL
NRBC BLD AUTO-RTO: 0 /100
PLATELET # BLD AUTO: 126 10E9/L (ref 150–450)
POTASSIUM SERPL-SCNC: 3.5 MMOL/L (ref 3.4–5.3)
PROT SERPL-MCNC: 7 G/DL (ref 6.8–8.8)
RBC # BLD AUTO: 4.9 10E12/L (ref 4.4–5.9)
SODIUM SERPL-SCNC: 140 MMOL/L (ref 133–144)
TSH SERPL DL<=0.005 MIU/L-ACNC: 2.6 MU/L (ref 0.4–4)
WBC # BLD AUTO: 2.5 10E9/L (ref 4–11)

## 2020-09-21 PROCEDURE — 99214 OFFICE O/P EST MOD 30 MIN: CPT | Mod: ZP | Performed by: PHYSICIAN ASSISTANT

## 2020-09-21 PROCEDURE — 80053 COMPREHEN METABOLIC PANEL: CPT | Performed by: PHYSICIAN ASSISTANT

## 2020-09-21 PROCEDURE — G0008 ADMIN INFLUENZA VIRUS VAC: HCPCS

## 2020-09-21 PROCEDURE — G0463 HOSPITAL OUTPT CLINIC VISIT: HCPCS | Mod: ZF

## 2020-09-21 PROCEDURE — 25000128 H RX IP 250 OP 636: Mod: ZF | Performed by: PHYSICIAN ASSISTANT

## 2020-09-21 PROCEDURE — 85610 PROTHROMBIN TIME: CPT | Performed by: PHYSICIAN ASSISTANT

## 2020-09-21 PROCEDURE — 85025 COMPLETE CBC W/AUTO DIFF WBC: CPT | Performed by: PHYSICIAN ASSISTANT

## 2020-09-21 PROCEDURE — 90682 RIV4 VACC RECOMBINANT DNA IM: CPT | Mod: ZF | Performed by: PHYSICIAN ASSISTANT

## 2020-09-21 PROCEDURE — 84443 ASSAY THYROID STIM HORMONE: CPT | Performed by: PHYSICIAN ASSISTANT

## 2020-09-21 PROCEDURE — 25800030 ZZH RX IP 258 OP 636: Mod: ZF | Performed by: PHYSICIAN ASSISTANT

## 2020-09-21 PROCEDURE — 96413 CHEMO IV INFUSION 1 HR: CPT

## 2020-09-21 RX ORDER — PREDNISONE 5 MG/1
2.5 TABLET ORAL DAILY
Qty: 90 TABLET | Refills: 3 | COMMUNITY
Start: 2020-09-21 | End: 2020-10-16

## 2020-09-21 RX ORDER — HEPARIN SODIUM (PORCINE) LOCK FLUSH IV SOLN 100 UNIT/ML 100 UNIT/ML
500 SOLUTION INTRAVENOUS ONCE
Status: COMPLETED | OUTPATIENT
Start: 2020-09-21 | End: 2020-09-21

## 2020-09-21 RX ORDER — HEPARIN SODIUM (PORCINE) LOCK FLUSH IV SOLN 100 UNIT/ML 100 UNIT/ML
5 SOLUTION INTRAVENOUS DAILY PRN
Status: DISCONTINUED | OUTPATIENT
Start: 2020-09-21 | End: 2020-09-21 | Stop reason: HOSPADM

## 2020-09-21 RX ADMIN — INFLUENZA A VIRUS A/HAWAII/70/2019 (H1N1) RECOMBINANT HEMAGGLUTININ ANTIGEN, INFLUENZA A VIRUS A/MINNESOTA/41/2019 (H3N2) RECOMBINANT HEMAGGLUTININ ANTIGEN, INFLUENZA B VIRUS B/WASHINGTON/02/2019 RECOMBINANT HEMAGGLUTININ ANTIGEN, AND INFLUENZA B VIRUS B/PHUKET/3073/2013 RECOMBINANT HEMAGGLUTININ ANTIGEN 0.5 ML: 45; 45; 45; 45 INJECTION INTRAMUSCULAR at 09:46

## 2020-09-21 RX ADMIN — Medication 5 ML: at 07:29

## 2020-09-21 RX ADMIN — SODIUM CHLORIDE 250 ML: 9 INJECTION, SOLUTION INTRAVENOUS at 09:25

## 2020-09-21 RX ADMIN — Medication 500 UNITS: at 10:06

## 2020-09-21 RX ADMIN — SODIUM CHLORIDE 200 MG: 9 INJECTION, SOLUTION INTRAVENOUS at 09:26

## 2020-09-21 ASSESSMENT — PAIN SCALES - GENERAL: PAINLEVEL: NO PAIN (0)

## 2020-09-21 ASSESSMENT — MIFFLIN-ST. JEOR: SCORE: 2537.51

## 2020-09-21 NOTE — NURSING NOTE
"Oncology Rooming Note    September 21, 2020 7:49 AM   Reuben Padilla is a 60 year old male who presents for:    Chief Complaint   Patient presents with     Port Draw     Labs drawn via port by RN in lab. VS taken.      Oncology Clinic Visit     CANCER OF DISTAL THIRD OF ESOPHAGUS     Initial Vitals: /89   Pulse 102   Temp 97.8  F (36.6  C) (Oral)   Resp 16   Ht (P) 1.981 m (6' 5.99\")   Wt (!) 159.4 kg (351 lb 8 oz)   SpO2 97%   BMI (P) 40.63 kg/m   Estimated body mass index is 40.63 kg/m  (pended) as calculated from the following:    Height as of this encounter: (P) 1.981 m (6' 5.99\").    Weight as of this encounter: 159.4 kg (351 lb 8 oz). Body surface area is 2.96 meters squared (pended).  No Pain (0) Comment: Data Unavailable   No LMP for male patient.  Allergies reviewed: Yes  Medications reviewed: Yes    Medications: Medication refills not needed today.  Pharmacy name entered into EPIC:    [id number] - [place], [place] - [address] [facility] - [place], [place] - [address] [facility] - [facility] [address]    Clinical concerns: Patient believes he has a balance issue going on. Patient noticed a few weeks ago. No dizziness associated.        Nati Thornton MA            "

## 2020-09-21 NOTE — PROGRESS NOTES
ANTICOAGULATION FOLLOW-UP CLINIC VISIT    Patient Name:  Reuben Padilla  Date:  2020  Contact Type:  Telephone    SUBJECTIVE:         OBJECTIVE    Recent labs: (last 7 days)     20  0735   INR 1.60*       ASSESSMENT / PLAN  INR assessment SUB    Recheck INR In: 6 WEEKS    INR Location Clinic      Anticoagulation Summary  As of 2020    INR goal:   2.0-3.0   TTR:   62.7 % (6.2 mo)   INR used for dosin.60! (2020)   Warfarin maintenance plan:   5 mg (5 mg x 1) every day   Full warfarin instructions:   : 7.5 mg; Otherwise 5 mg every day   Weekly warfarin total:   35 mg   Plan last modified:   Delfina Thomas RN (2020)   Next INR check:   2020   Priority:   High   Target end date:   Indefinite    Indications    Acute pulmonary embolism (H) [I26.99]  Hx of pulmonary embolus [Z86.711]  Other acute pulmonary embolism without acute cor pulmonale (H) [I26.99]             Anticoagulation Episode Summary     INR check location:       Preferred lab:       Send INR reminders to:   [facility]    Comments:   [facility] ([phone] (F)[phone], Takes Warfarin in the am      Anticoagulation Care Providers     Provider Role Specialty Phone number    Kadi Dee MD Referring Hematology [phone]            See the Encounter Report to view Anticoagulation Flowsheet and Dosing Calendar (Go to Encounters tab in chart review, and find the Anticoagulation Therapy Visit)  Spoke with patient.  He wanted to go 6 weeks before next lab so it coincides with his other labs.    Alley Alarcon RN

## 2020-09-21 NOTE — PROGRESS NOTES
Infusion Nursing Note:  Reuben Padilla presents today for Cycle 29 Day 1 Pembrolizumab and Influenza vaccine.    Patient seen by provider today: Yes: Loraine Sutherland PA-C.   present during visit today: Not Applicable.    9/21/20 0852 TORB:  Loraine Sutherland PA-C/oJnn Multani RN  - Please give patient a flu shot today  - Confirmed with Dr. Dee that patient is q6week Keytruda and dosed at 200mg.    Intravenous Access:  Implanted Port.    Treatment Conditions:  Lab Results   Component Value Date    HGB 15.3 09/21/2020     Lab Results   Component Value Date    WBC 2.5 09/21/2020      Lab Results   Component Value Date    ANEU 1.2 09/21/2020     Lab Results   Component Value Date     09/21/2020      Lab Results   Component Value Date     09/21/2020                   Lab Results   Component Value Date    POTASSIUM 3.5 09/21/2020           Lab Results   Component Value Date    MAG 1.9 11/07/2018            Lab Results   Component Value Date    CR 1.02 09/21/2020                   Lab Results   Component Value Date    NIDHI 8.9 09/21/2020                Lab Results   Component Value Date    BILITOTAL 1.3 09/21/2020           Lab Results   Component Value Date    ALBUMIN 3.9 09/21/2020                    Lab Results   Component Value Date    ALT 30 09/21/2020           Lab Results   Component Value Date    AST 16 09/21/2020       Results reviewed, labs MET treatment parameters, ok to proceed with treatment.      Post Infusion Assessment:  Patient tolerated infusion without incident.  Patient tolerated injection without incident.  Influenza vaccine administered intramuscularly into patient's right deltoid.  Site patent and intact, free from redness, edema or discomfort.  No evidence of extravasations.  Access discontinued per protocol.     Discharge Plan:   Patient declined prescription refills.  Discharge instructions reviewed with: Patient.  Patient and/or family verbalized understanding of  discharge instructions and all questions answered.  AVS to patient via [place].  Patient will return 11/2/20 for next appointment.   Patient discharged in stable condition accompanied by: self.  Departure Mode: Ambulatory.  Face to Face time: 0.    MONTSE SHETTY RN

## 2020-09-21 NOTE — PROGRESS NOTES
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 His tumor is positive for cytokeratin 20, negative for P63 and CK7, and HER2 is negative. He started on FOLFOX (5FU/oxaliplatin) on 5/15/2017. He had a delay in treatment from 9/5-10/2/17 due to work related injury.     He had an excellent response by imaging throughout the summer 2017.    He a mixed response by imaging in late fall 2017.    In January 2018, he was switched to taxol and cyramza - had slight progression and neuropathy and clinical trial became available.        In March 2018, he was started on the [facility] Match Clinical Trial with crizotinib.  (MET amplification) He remained on crizotinib from March - July 2018.     August 2018, he was switched back to taxol/cramza.  In October 2018, he was switched to Keytruda (PD1 overexpressor).    -April 2019, his infusion was held for three weeks, and he was treated with prednisone and Enbrel for grade 3 arthralgias. Keytruda was resumed   -May 2020 he started Actemra (5/14).  This was initially WEEKLY and then in August- moved to QOW (secondary to fatigue and concerns for low WBC).     -August 2020 moved to q6 week Keytruda (still at the 200 mg dose)     INTERVAL HISTORY:   Levi has now been getting his Keytruda every six weeks now (this started 8/10/20).  Levi reported to Dr Dee in July he was having fatigue and worsening stamina.  Dr Damico switched his Actembra to QOW and Dr Dee moved his Keytruda to every 6 weeks.  Levi also has taken a few measures into his own hands- he started the Keto diet and has lost about 18 pounds in the last 3 weeks.  Given his stable arthralgias, he reduced his prednisone to 2.5 mg daily.  He has been at this the  "last 3 weeks and doing well.   He is also back on his total gym.  He still feels his stamina is low- example- he tried to bike on his stationary bike and made it 5 min.  Also when mowing the yard- takes a couple breaks.       He has had no issues with fevers or chills, no chest pain or shortness of breath, no nausea, vomiting, diarrhea or constipation.  No rash       He has no abdminal pain.  No n/v/d/c.   Neuropathy is well controlled and in his feet, stable.  Occ has some balance issues.     He is on Coumadin now. No bruising or bleeding. His INR is being managed in [place].          ROS: 10 point ROS neg other than the symptoms noted above in the HPI.     PHYSICAL EXAMINATION  /89   Pulse 102   Temp 97.8  F (36.6  C) (Oral)   Resp 16   Ht (P) 1.981 m (6' 5.99\")   Wt (!) 159.4 kg (351 lb 8 oz)   SpO2 97%   BMI (P) 40.63 kg/m    351 lbs 8 oz  Alert, oriented, no distress.  Moist mucosae.  Supple neck, no lymphadenopathy.  Clear AE bilaterally.  Port clean  Regular heart sounds  Obese abdomen, no murmurs.  Extensive telangiectasias bilateral LE  No edema.   No gross focal deficits.      Labs:    9/21/2020 07:35   Sodium 140   Potassium 3.5   Chloride 106   Carbon Dioxide 22   Urea Nitrogen 20   Creatinine 1.02   GFR Estimate 79   GFR Estimate If Black >90   Calcium 8.9   Anion Gap 12   Albumin 3.9   Protein Total 7.0   Bilirubin Total 1.3   Alkaline Phosphatase 68   ALT 30   AST 16   TSH 2.60   Glucose 93   WBC 2.5 (L)   Hemoglobin 15.3   Hematocrit 44.4   Platelet Count 126 (L)   RBC Count 4.90   MCV 91   MCH 31.2   MCHC 34.5   RDW 14.1   Diff Method Automated Method   % Neutrophils 50.3   % Lymphocytes 36.7   % Monocytes 10.2   % Eosinophils 1.6   % Basophils 0.8   % Immature Granulocytes 0.4   Nucleated RBCs 0   Absolute Neutrophil 1.2 (L)     IMPRESSION/PLAN:  1. Metastatic esophogeal adenocarcinoma. He has stable disease on Keytruda, for TWO years now, which is great news.   His last CT scan was " stable.  Given ongoing arthralgias and fatigue and stable disease, his Keytruda was dropped back to every 6 weeks, still at the 200 mg dose.  He will see Dr Dee in early November.  He is doing really well today.    2. Immune related arthralgias- still thenar atrophy in his hands, but good strength.  No actual pain, just stiffness.  Continues on the QOW Actemra.  He self reduced his prednisone to 2.5 mg daily for the last 3 weeks.  He feels his symptoms are stable.  I encouraged him to stay at 2.5 mg a day for a month.  Then he could go to 2.5 mg every other day for another month.     3. H/o PE 2018.   He has progressed through Xarelto in the past (didn't take it regularly).  He was then on lovenox but his insurance changed and this became unaffordable. He is now on Coumadin and labs drawn  at [facility] in [place] fax ([phone]).  He is monitored by our coumadin clinic.     4. Baseline neuropathy and got worse with chemo and is on gabapentin BID, its primarily sensory in his feet only.  Has some balance issues on occasion, likely 2/2 to his weakness.       5. Fatigue- TSH, Cortisol normal. Insomnia could be contributing to fatigue. Sleep is off/on.  He is on the Keto diet and losing weight and back on his total gym every day, so we will see if ongoing dose changed with the actemra and keytruda, in addition to the diet/exercise make a difference.      Nicole Sutherland PA-C

## 2020-10-16 DIAGNOSIS — T45.1X5A CHEMOTHERAPY-INDUCED NEUROPATHY (H): ICD-10-CM

## 2020-10-16 DIAGNOSIS — C79.9 METASTASIS FROM GASTRIC CANCER (H): ICD-10-CM

## 2020-10-16 DIAGNOSIS — G62.0 CHEMOTHERAPY-INDUCED NEUROPATHY (H): ICD-10-CM

## 2020-10-16 DIAGNOSIS — C16.9 METASTASIS FROM GASTRIC CANCER (H): ICD-10-CM

## 2020-10-16 RX ORDER — GABAPENTIN 300 MG/1
CAPSULE ORAL
Qty: 450 CAPSULE | Refills: 3 | Status: SHIPPED | OUTPATIENT
Start: 2020-10-16 | End: 2020-12-14

## 2020-10-16 RX ORDER — ZOLPIDEM TARTRATE 10 MG/1
10 TABLET ORAL
Qty: 90 TABLET | Refills: 1 | Status: SHIPPED | OUTPATIENT
Start: 2020-10-16 | End: 2020-12-14

## 2020-10-29 NOTE — IP AVS SNAPSHOT
[address] [address]    Phone:  [phone]                                       After Visit Summary   11/1/2018    Reuben Padilla    MRN: [id number]           After Visit Summary Signature Page     I have received my discharge instructions, and my questions have been answered. I have discussed any challenges I see with this plan with the nurse or doctor.    ..........................................................................................................................................  Patient/Patient Representative Signature      ..........................................................................................................................................  Patient Representative Print Name and Relationship to Patient    ..................................................               ................................................  Date                                   Time    ..........................................................................................................................................  Reviewed by Signature/Title    ...................................................              ..............................................  Date                                               Time          22EPIC Rev 08/18         Subjective:      Patient ID: Gera Zurita is a 60 y.o. male.  Patient Active Problem List   Diagnosis    GERD (gastroesophageal reflux disease)    Coronary artery disease    Steroid-induced hyperglycemia    Immunosuppression    Lung replaced by transplant    Prophylactic antibiotic    Complications of transplanted lung    Acute rejection of lung transplant    Leukopenia    Bronchial stenosis, right    Bronchial stenosis    SENAIT on CPAP    Hyperglycemia    Complication of transplanted organ    Acute rejection of transplanted lung, grade A2 by International Society of Heart and Lung Transplantation (ISHLT) 2007 guideline    Diabetes mellitus    Klebsiella pneumonia    Adrenal cortical steroids causing adverse effect in therapeutic use    Elevated hemoglobin A1c    Long-term insulin use    Post-transplant diabetes mellitus    Pneumonia due to Pseudomonas species     Problem list has been reviewed.    Chief Complaint: Sleep Apnea and Pneumonia    HPI    His is here for follow up for SENAIT and CPAP compliance evaluation. He has stopped using his CPAP because he is worried about lung infection. He developed recurrent pseudomonal lung infection. He is currently on CAYSTON and tolerating. Plan is for 6 months of therapy.     He is requesting a new CPAP machine. HE IS ELIGIBLE FOR A NEW CPAP ( 09/24/15).  He is on CPAP of 6 CMWP.  He definitely thinks PAP is beneficial to his health and he wants to continue with PAP therapy.     [place]ok Sleepiness Scale   EPWORTH SLEEPINESS SCALE 10/29/2020 8/9/2018 8/5/2017 2/18/2016 9/24/2015   Sitting and reading 1 0 0 0 0   Watching TV 1 1 1 1 1   Sitting, inactive in a public place (e.g. a theatre or a meeting) 0 0 0 0 0   As a passenger in a car for an hour without a break 0 0 0 0 0   Lying down to rest in the afternoon when circumstances permit 0 0 3 0 0   Sitting and talking to someone 3 0 0 0 0   Sitting quietly after a lunch without alcohol 0 0 1 0 1   In a  car, while stopped for a few minutes in traffic 0 0 0 0 0   Total score 5 1 5 1 2         He is S/P BSLT 03/04/15 with A1 rejection, RSMB stenosis ( cryotherapy) and refractory rejection since 03/16 (alemtuzumab and pulse steroids). Pseudomonal pneumonia and ACR / B1R 01/2020. Spirometry remains within normal limits.     Anti rejection regimen: - Prograf, Cellcept, Prednisone.    Prophylaxis: Azithromycin, Sulfamethoxazole- trimtthoprim    Patients denies any  dyspnea    He exercises regularly.       No recent change in breathing.     A full  review of systems, past , family  and social histories was performed except as mentioned in the note above, these are non contributory to the main issues discussed today.       Previous Report Reviewed: office notes     The following portions of the patient's history were reviewed and updated as appropriate: He  has a past medical history of Arthritis, CHF (congestive heart failure), Complications of transplanted lung, Diabetes mellitus (01/2020), and Idiopathic pulmonary fibrosis.  He  has a past surgical history that includes Lung transplant, double (March 2015); Bronchoscopy (N/A, 11/14/2019); Bronchoscopy (Bilateral, 1/3/2020); Bronchoscopy (N/A, 1/23/2020); Bronchoscopy (N/A, 3/5/2020); Bronchoscopy (N/A, 4/16/2020); Bronchoscopy with biopsy (4/16/2020); and Bronchoscopy (N/A, 5/21/2020).  His family history includes Alcohol abuse in his father; Arthritis in his father and mother; Asthma in his father; Birth defects in his maternal uncle and paternal aunt; COPD in his father; Glaucoma in his mother; Heart disease in his father; Hyperlipidemia in his father and mother; Hypertension in his mother.  He  reports that he quit smoking about 13 years ago. He has never used smokeless tobacco. He reports current alcohol use of about 14.0 standard drinks of alcohol per week. He reports that he does not use drugs.  He has a current medication list which includes the following  "prescription(s): accu-chek navin plus test strp, alcohol swabs, aspirin, atorvastatin, azithromycin, aztreonam, blood sugar diagnostic, blood sugar diagnostic, blood sugar diagnostic, blood sugar diagnostic, blood-glucose meter, budesonide, cetirizine, ferrous sulfate, fluticasone propionate, folic acid, gabapentin, hydrocodone-acetaminophen, insulin aspart u-100, levemir flextouch u-100 insuln, lancets, magnesium oxide, multivitamin, omeprazole, prednisone, sulfamethoxazole-trimethoprim 800-160mg, tacrolimus, tacrolimus, tadalafil, trazodone, and azelastine.  He has No Known Allergies..    Review of Systems   Constitutional: Negative for fever, chills, fatigue, night sweats and weakness.   HENT: Positive for postnasal drip and sinus pressure. Negative for sore throat, ear pain and hearing loss.    Eyes: Negative for redness and itching.   Respiratory: Positive for shortness of breath. Negative for snoring, cough, hemoptysis, sputum production, choking, chest tightness, wheezing, orthopnea, dyspnea on extertion, use of rescue inhaler and somnolence.    Cardiovascular: Negative for chest pain, palpitations and leg swelling.   Genitourinary: Negative for difficulty urinating and hematuria.   Endocrine: Negative for polydipsia, polyphagia, cold intolerance and heat intolerance.    Musculoskeletal: Positive for arthralgias. Negative for back pain and gait problem.   Skin: Negative for rash.   Gastrointestinal: Positive for acid reflux. Negative for nausea, vomiting and abdominal pain.   Neurological: Negative for dizziness, syncope, light-headedness and headaches.   Hematological: Does not bruise/bleed easily and no excessive bruising.   Psychiatric/Behavioral: The patient is nervous/anxious.    All other systems reviewed and are negative.     Objective:     /78   Pulse 93   Resp 16   Ht 6' 1" (1.854 m)   Wt 95.1 kg (209 lb 10.5 oz)   SpO2 97%   BMI 27.66 kg/m²   Body mass index is 27.66 kg/m².    Physical " Exam  Vitals signs and nursing note reviewed.   Constitutional:       Appearance: He is well-developed.   HENT:      Head: Normocephalic and atraumatic.   Eyes:      Pupils: Pupils are equal, round, and reactive to light.   Neck:      Musculoskeletal: Normal range of motion and neck supple.   Cardiovascular:      Rate and Rhythm: Normal rate and regular rhythm.   Pulmonary:      Effort: Pulmonary effort is normal.      Breath sounds: Rales (Right base) present.   Abdominal:      General: Bowel sounds are normal.      Palpations: Abdomen is soft.   Musculoskeletal: Normal range of motion.   Skin:     General: Skin is warm and dry.   Neurological:      Mental Status: He is alert and oriented to person, place, and time.      Deep Tendon Reflexes: Reflexes are normal and symmetric.         Personal Diagnostic Review  CPAP compliance download       CT chest : 9/29/2020:   Interval improvement in peribronchial thickening and centrilobular micro nodules seen in the right lower lobe when compared to the previous exam suggestive of improving inflammatory changes.  There is a small nodule right lower lobe measuring less than 5 mm not clearly present on the prior exam but likely infectious/inflammatory in etiology.  A few nodules have resolved while multiple other nodules are stable in appearance as detailed above.  Continued follow-up can be performed.    Assessment / plan :       Discussed diagnosis, its evaluation, treatment and usual course. All questions answered.    Problem List Items Addressed This Visit        Pulmonary    Lung replaced by transplant    Current Assessment & Plan      S/P BSLT 03/04/15 with A1 rejection, RSMB stenosis ( cryotherapy) and refractory rejection since 03/16 (alemtuzumab and pulse steroids). Spirometry remains within normal limits.     Follow up with transpant team at [facility] as scheduled.             Other    SENAIT on CPAP - Primary    Current Assessment & Plan     Continue CPAP of 6. (DME -  JANE)     Discussed therapeutic goals for positive airway pressure therapy(CPAP or BiPAP): Ideal is usage 100% of nights for 6 - 8 hours per night. Minimum usage is 70% of night for at least 4 hours per night used. Pateint expressed understanding. All Questions answered.    CPAP machine AND  supplies renewed.     Re evaluate in 1 year.          Relevant Orders    CPAP FOR HOME USE             TIME SPENT WITH PATIENT: Time spent: 30 minutes in face to face  discussion concerning diagnosis, prognosis, review of lab and test results, benefits of treatment as well as management of disease, counseling of patient and coordination of care between various health  care providers . Greater than half the time spent was used for coordination of care and counseling of patient.     Follow up in about 1 year (around 10/29/2021) for SENAIT, LUNG TRANSPLANT.

## 2020-11-02 ENCOUNTER — TELEPHONE (OUTPATIENT)
Dept: RHEUMATOLOGY | Facility: CLINIC | Age: 60
End: 2020-11-02

## 2020-11-02 ENCOUNTER — APPOINTMENT (OUTPATIENT)
Dept: LAB | Facility: CLINIC | Age: 60
End: 2020-11-02
Attending: INTERNAL MEDICINE
Payer: MEDICARE

## 2020-11-02 ENCOUNTER — INFUSION THERAPY VISIT (OUTPATIENT)
Dept: ONCOLOGY | Facility: CLINIC | Age: 60
End: 2020-11-02
Attending: INTERNAL MEDICINE
Payer: MEDICARE

## 2020-11-02 ENCOUNTER — ANCILLARY PROCEDURE (OUTPATIENT)
Dept: CT IMAGING | Facility: CLINIC | Age: 60
End: 2020-11-02
Attending: INTERNAL MEDICINE
Payer: MEDICARE

## 2020-11-02 ENCOUNTER — ANTICOAGULATION THERAPY VISIT (OUTPATIENT)
Dept: ANTICOAGULATION | Facility: CLINIC | Age: 60
End: 2020-11-02

## 2020-11-02 VITALS
RESPIRATION RATE: 18 BRPM | BODY MASS INDEX: 41.6 KG/M2 | WEIGHT: 315 LBS | OXYGEN SATURATION: 95 % | HEART RATE: 82 BPM | TEMPERATURE: 98 F | DIASTOLIC BLOOD PRESSURE: 94 MMHG | SYSTOLIC BLOOD PRESSURE: 136 MMHG

## 2020-11-02 VITALS
OXYGEN SATURATION: 95 % | RESPIRATION RATE: 18 BRPM | WEIGHT: 315 LBS | BODY MASS INDEX: 36.45 KG/M2 | HEIGHT: 78 IN | SYSTOLIC BLOOD PRESSURE: 136 MMHG | HEART RATE: 82 BPM | DIASTOLIC BLOOD PRESSURE: 94 MMHG

## 2020-11-02 DIAGNOSIS — C15.5 CANCER OF DISTAL THIRD OF ESOPHAGUS (H): Primary | ICD-10-CM

## 2020-11-02 DIAGNOSIS — D70.1 CHEMOTHERAPY-INDUCED NEUTROPENIA (H): ICD-10-CM

## 2020-11-02 DIAGNOSIS — Z86.711 HX OF PULMONARY EMBOLUS: ICD-10-CM

## 2020-11-02 DIAGNOSIS — C16.9 METASTASIS FROM GASTRIC CANCER (H): Primary | ICD-10-CM

## 2020-11-02 DIAGNOSIS — I26.99 ACUTE PULMONARY EMBOLISM (H): ICD-10-CM

## 2020-11-02 DIAGNOSIS — M06.00 SERONEGATIVE RHEUMATOID ARTHRITIS (H): Primary | ICD-10-CM

## 2020-11-02 DIAGNOSIS — T45.1X5A CHEMOTHERAPY-INDUCED NEUTROPENIA (H): ICD-10-CM

## 2020-11-02 DIAGNOSIS — C79.9 METASTASIS FROM GASTRIC CANCER (H): Primary | ICD-10-CM

## 2020-11-02 DIAGNOSIS — I26.99 OTHER ACUTE PULMONARY EMBOLISM WITHOUT ACUTE COR PULMONALE (H): ICD-10-CM

## 2020-11-02 DIAGNOSIS — C15.5 CANCER OF DISTAL THIRD OF ESOPHAGUS (H): ICD-10-CM

## 2020-11-02 LAB
ALBUMIN SERPL-MCNC: 3.8 G/DL (ref 3.4–5)
ALP SERPL-CCNC: 63 U/L (ref 40–150)
ALT SERPL W P-5'-P-CCNC: 28 U/L (ref 0–70)
ANION GAP SERPL CALCULATED.3IONS-SCNC: 5 MMOL/L (ref 3–14)
AST SERPL W P-5'-P-CCNC: 18 U/L (ref 0–45)
BASOPHILS # BLD AUTO: 0 10E9/L (ref 0–0.2)
BASOPHILS NFR BLD AUTO: 0.8 %
BILIRUB SERPL-MCNC: 1.3 MG/DL (ref 0.2–1.3)
BUN SERPL-MCNC: 10 MG/DL (ref 7–30)
CALCIUM SERPL-MCNC: 8.8 MG/DL (ref 8.5–10.1)
CHLORIDE SERPL-SCNC: 112 MMOL/L (ref 94–109)
CO2 SERPL-SCNC: 26 MMOL/L (ref 20–32)
CREAT BLD-MCNC: 0.9 MG/DL (ref 0.66–1.25)
CREAT SERPL-MCNC: 0.92 MG/DL (ref 0.66–1.25)
DIFFERENTIAL METHOD BLD: ABNORMAL
EOSINOPHIL # BLD AUTO: 0 10E9/L (ref 0–0.7)
EOSINOPHIL NFR BLD AUTO: 1.5 %
ERYTHROCYTE [DISTWIDTH] IN BLOOD BY AUTOMATED COUNT: 12.3 % (ref 10–15)
GFR SERPL CREATININE-BSD FRML MDRD: 86 ML/MIN/{1.73_M2}
GFR SERPL CREATININE-BSD FRML MDRD: 89 ML/MIN/{1.73_M2}
GLUCOSE SERPL-MCNC: 104 MG/DL (ref 70–99)
HCT VFR BLD AUTO: 45.3 % (ref 40–53)
HGB BLD-MCNC: 15.3 G/DL (ref 13.3–17.7)
IMM GRANULOCYTES # BLD: 0 10E9/L (ref 0–0.4)
IMM GRANULOCYTES NFR BLD: 0 %
INR PPP: 2.74 (ref 0.86–1.14)
LYMPHOCYTES # BLD AUTO: 1.1 10E9/L (ref 0.8–5.3)
LYMPHOCYTES NFR BLD AUTO: 40 %
MCH RBC QN AUTO: 31.2 PG (ref 26.5–33)
MCHC RBC AUTO-ENTMCNC: 33.8 G/DL (ref 31.5–36.5)
MCV RBC AUTO: 92 FL (ref 78–100)
MONOCYTES # BLD AUTO: 0.2 10E9/L (ref 0–1.3)
MONOCYTES NFR BLD AUTO: 8.7 %
NEUTROPHILS # BLD AUTO: 1.3 10E9/L (ref 1.6–8.3)
NEUTROPHILS NFR BLD AUTO: 49 %
NRBC # BLD AUTO: 0 10*3/UL
NRBC BLD AUTO-RTO: 0 /100
PLATELET # BLD AUTO: 110 10E9/L (ref 150–450)
POTASSIUM SERPL-SCNC: 3.6 MMOL/L (ref 3.4–5.3)
PROT SERPL-MCNC: 6.7 G/DL (ref 6.8–8.8)
RBC # BLD AUTO: 4.91 10E12/L (ref 4.4–5.9)
SODIUM SERPL-SCNC: 143 MMOL/L (ref 133–144)
TSH SERPL DL<=0.005 MIU/L-ACNC: 2.62 MU/L (ref 0.4–4)
WBC # BLD AUTO: 2.7 10E9/L (ref 4–11)

## 2020-11-02 PROCEDURE — 258N000003 HC RX IP 258 OP 636: Performed by: INTERNAL MEDICINE

## 2020-11-02 PROCEDURE — 82565 ASSAY OF CREATININE: CPT | Performed by: PATHOLOGY

## 2020-11-02 PROCEDURE — 71260 CT THORAX DX C+: CPT | Mod: GC | Performed by: RADIOLOGY

## 2020-11-02 PROCEDURE — 250N000011 HC RX IP 250 OP 636: Performed by: INTERNAL MEDICINE

## 2020-11-02 PROCEDURE — 36415 COLL VENOUS BLD VENIPUNCTURE: CPT | Performed by: PATHOLOGY

## 2020-11-02 PROCEDURE — 85025 COMPLETE CBC W/AUTO DIFF WBC: CPT | Performed by: INTERNAL MEDICINE

## 2020-11-02 PROCEDURE — G0463 HOSPITAL OUTPT CLINIC VISIT: HCPCS

## 2020-11-02 PROCEDURE — 84443 ASSAY THYROID STIM HORMONE: CPT | Performed by: INTERNAL MEDICINE

## 2020-11-02 PROCEDURE — 85610 PROTHROMBIN TIME: CPT | Performed by: INTERNAL MEDICINE

## 2020-11-02 PROCEDURE — 96413 CHEMO IV INFUSION 1 HR: CPT

## 2020-11-02 PROCEDURE — 99215 OFFICE O/P EST HI 40 MIN: CPT | Performed by: INTERNAL MEDICINE

## 2020-11-02 PROCEDURE — 80053 COMPREHEN METABOLIC PANEL: CPT | Performed by: INTERNAL MEDICINE

## 2020-11-02 PROCEDURE — 99207 PR NO CHARGE LOS: CPT

## 2020-11-02 PROCEDURE — 74177 CT ABD & PELVIS W/CONTRAST: CPT | Mod: GC | Performed by: RADIOLOGY

## 2020-11-02 RX ORDER — METHYLPREDNISOLONE SODIUM SUCCINATE 125 MG/2ML
125 INJECTION, POWDER, LYOPHILIZED, FOR SOLUTION INTRAMUSCULAR; INTRAVENOUS
Status: CANCELLED
Start: 2020-11-02

## 2020-11-02 RX ORDER — SODIUM CHLORIDE 9 MG/ML
1000 INJECTION, SOLUTION INTRAVENOUS CONTINUOUS PRN
Status: CANCELLED
Start: 2020-11-02

## 2020-11-02 RX ORDER — DIPHENHYDRAMINE HYDROCHLORIDE 50 MG/ML
50 INJECTION INTRAMUSCULAR; INTRAVENOUS
Status: CANCELLED
Start: 2020-11-02

## 2020-11-02 RX ORDER — ALBUTEROL SULFATE 90 UG/1
1-2 AEROSOL, METERED RESPIRATORY (INHALATION)
Status: CANCELLED
Start: 2020-11-02

## 2020-11-02 RX ORDER — LORAZEPAM 2 MG/ML
0.5 INJECTION INTRAMUSCULAR EVERY 4 HOURS PRN
Status: CANCELLED
Start: 2020-11-02

## 2020-11-02 RX ORDER — HEPARIN SODIUM (PORCINE) LOCK FLUSH IV SOLN 100 UNIT/ML 100 UNIT/ML
5 SOLUTION INTRAVENOUS EVERY 8 HOURS
Status: DISCONTINUED | OUTPATIENT
Start: 2020-11-02 | End: 2020-11-02 | Stop reason: HOSPADM

## 2020-11-02 RX ORDER — ALBUTEROL SULFATE 0.83 MG/ML
2.5 SOLUTION RESPIRATORY (INHALATION)
Status: CANCELLED | OUTPATIENT
Start: 2020-11-02

## 2020-11-02 RX ORDER — HEPARIN SODIUM (PORCINE) LOCK FLUSH IV SOLN 100 UNIT/ML 100 UNIT/ML
500 SOLUTION INTRAVENOUS ONCE
Status: CANCELLED | OUTPATIENT
Start: 2020-11-02 | End: 2020-11-02

## 2020-11-02 RX ORDER — EPINEPHRINE 0.3 MG/.3ML
0.3 INJECTION SUBCUTANEOUS EVERY 5 MIN PRN
Status: CANCELLED | OUTPATIENT
Start: 2020-11-02

## 2020-11-02 RX ORDER — MEPERIDINE HYDROCHLORIDE 25 MG/ML
25 INJECTION INTRAMUSCULAR; INTRAVENOUS; SUBCUTANEOUS EVERY 30 MIN PRN
Status: CANCELLED | OUTPATIENT
Start: 2020-11-02

## 2020-11-02 RX ORDER — HEPARIN SODIUM (PORCINE) LOCK FLUSH IV SOLN 100 UNIT/ML 100 UNIT/ML
500 SOLUTION INTRAVENOUS ONCE
Status: COMPLETED | OUTPATIENT
Start: 2020-11-02 | End: 2020-11-02

## 2020-11-02 RX ORDER — IOPAMIDOL 755 MG/ML
135 INJECTION, SOLUTION INTRAVASCULAR ONCE
Status: COMPLETED | OUTPATIENT
Start: 2020-11-02 | End: 2020-11-02

## 2020-11-02 RX ORDER — EPINEPHRINE 1 MG/ML
0.3 INJECTION, SOLUTION INTRAMUSCULAR; SUBCUTANEOUS EVERY 5 MIN PRN
Status: CANCELLED | OUTPATIENT
Start: 2020-11-02

## 2020-11-02 RX ADMIN — IOPAMIDOL 135 ML: 755 INJECTION, SOLUTION INTRAVASCULAR at 07:32

## 2020-11-02 RX ADMIN — Medication 500 UNITS: at 13:13

## 2020-11-02 RX ADMIN — HEPARIN SODIUM (PORCINE) LOCK FLUSH IV SOLN 100 UNIT/ML 500 UNITS: 100 SOLUTION at 08:12

## 2020-11-02 RX ADMIN — SODIUM CHLORIDE 250 ML: 9 INJECTION, SOLUTION INTRAVENOUS at 12:27

## 2020-11-02 RX ADMIN — SODIUM CHLORIDE 200 MG: 9 INJECTION, SOLUTION INTRAVENOUS at 12:28

## 2020-11-02 RX ADMIN — Medication 5 ML: at 07:15

## 2020-11-02 ASSESSMENT — PAIN SCALES - GENERAL
PAINLEVEL: MILD PAIN (2)
PAINLEVEL: MILD PAIN (2)

## 2020-11-02 ASSESSMENT — MIFFLIN-ST. JEOR: SCORE: 2579.15

## 2020-11-02 NOTE — NURSING NOTE
Chief Complaint   Patient presents with     Port Draw     power needle, heparin locked, vitals checked     Kamilah Jiménez RN on 11/2/2020 at 7:16 AM

## 2020-11-02 NOTE — LETTER
"    11/2/2020         RE: Reuben Padilla  [address] [place] [id number]        Dear Colleague,    Thank you for referring your patient, Reuben Padilla, to the [facility]. Please see a copy of my visit note below.    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 His tumor is positive for cytokeratin 20, negative for P63 and CK7, and HER2 is negative. He started on FOLFOX (5FU/oxaliplatin) on 5/15/2017. He had a delay in treatment from 9/5-10/2/17 due to work related injury.     He had an excellent response by imaging throughout the summer 2017.    He a mixed response by imaging in late fall 2017.    In January 2018, he was switched to taxol and cyramza - had slight progression and neuropathy and clinical trial became available.        In March 2018, he was started on the [facility] Match Clinical Trial with crizotinib.  (MET amplification) He remained on crizotinib from March - July 2018.     August 2018, he was switched back to taxol/cramza.  In October, he was switched to Keytruda (PD1 overexpressor).     In April 2019, his infusion was held for three weeks, and he was treated with prednisone and Enbrel for grade 3 arthralgias. Keytruda was resumed.      INTERVAL HISTORY:   In the summer of 2020, he was transitioned to Keytruda every six weeks, based on updated data.  His arthralgias and arthritis improved.  He has also been using Actembra.  He is on no prednisone.      Levi called last week complaining of a few episodes of dysphagia.  He reported difficulty with swallowing, and feeling like \"something is stuck.\"  This has not occurred again in the " "last week.    He had scans today and is anxious to hear about this.     He has had no issues with fevers or chills, no chest pain or shortness of breath, no nausea, vomiting, diarrhea or constipation.  No rash       He has no abdminal pain.  No dysphagia      He reports fatigue, overall this is stable, but not much better.          ROS: 10 point ROS neg other than the symptoms noted above in the HPI.     PHYSICAL EXAMINATION  BP (!) 136/94   Pulse 82   Resp 18   Ht 1.979 m (6' 5.9\")   Wt (!) 163.7 kg (361 lb)   SpO2 95%   BMI 41.82 kg/m    361 lbs 0 oz  Alert, oriented, no distress.  Moist mucosae.  Supple neck, no lymphadenopathy.  Clear AE bilaterally.  Port clean  Regular heart sounds  Obese abdomen, no murmurs.  Extensive telangiectasias bilateral LE  No edema.   No gross focal deficits.  Musc:  5/5 strength in hands, quads      Labs:      Last Comprehensive Metabolic Panel:  Sodium   Date Value Ref Range Status   11/02/2020 143 133 - 144 mmol/L Final     Potassium   Date Value Ref Range Status   11/02/2020 3.6 3.4 - 5.3 mmol/L Final     Chloride   Date Value Ref Range Status   11/02/2020 112 (H) 94 - 109 mmol/L Final     Carbon Dioxide   Date Value Ref Range Status   11/02/2020 26 20 - 32 mmol/L Final     Anion Gap   Date Value Ref Range Status   11/02/2020 5 3 - 14 mmol/L Final     Glucose   Date Value Ref Range Status   11/02/2020 104 (H) 70 - 99 mg/dL Final     Urea Nitrogen   Date Value Ref Range Status   11/02/2020 10 7 - 30 mg/dL Final     Creatinine   Date Value Ref Range Status   11/02/2020 0.92 0.66 - 1.25 mg/dL Final     GFR Estimate   Date Value Ref Range Status   11/02/2020 86 >60 mL/min/[1.73_m2] Final     Calcium   Date Value Ref Range Status   11/02/2020 8.8 8.5 - 10.1 mg/dL Final     Bilirubin Total   Date Value Ref Range Status   11/02/2020 1.3 0.2 - 1.3 mg/dL Final     Alkaline Phosphatase   Date Value Ref Range Status   11/02/2020 63 40 - 150 U/L Final     ALT   Date Value Ref Range " Status   11/02/2020 28 0 - 70 U/L Final     AST   Date Value Ref Range Status   11/02/2020 18 0 - 45 U/L Final     TSH, Cortisol WNL     Reviewed CT scan with radiology demonstrating progression around the celiac plexus.  No new lesions.      IMPRESSION/PLAN:  1. Metastatic esophogeal adenocarcinoma. He has stable disease on Keytruda. He did develop grade 3 arthralgias and was placed on prednisone and Actembra (previously Enbrel).  He was transitioned to keytruda every six weeks.  We discussed given the mild progression that I would recommend resuming Keytruda every three weeks.      I would like to screen him for the Quilt trial.    Dr Singer was notified that his arthralgias may worsen.        Endocrine labs are normal.    2. H/o PE 2018.   He has progressed through Xarelto in the past (didn't take it regularly).  He was then on lovenox but his insurance changed and this became unaffordable. He is now on Coumadin and labs drawn  at [facility] in [place] fax ([phone]).  He is monitored by our coumadin clinic.     3. Baseline neuropathy and got worse with chemo and is on gabapentin 600/300/600 with relief.    4. Refer to PT at [place].                 Kadi Dee MD

## 2020-11-02 NOTE — TELEPHONE ENCOUNTER
Called and spoke with pt, as he called today to report that his tumor has grown and they are going to increase his keytruda from every 6 weeks to every 3 weeks.   During this time he has been on Actemra every 2 weeks.  Has been able to get off prednisone, but continues to have stiffness and pain in fingers, but has been tolerable. He is concerned that by increasing the Keytruda, he is going to have increasing pain and stiffness in hands, as he did before.  He is wondering if it would be a good idea to increase Actemra to every week, to try to get what pain and stiffness he does have under better control, before the increase in medication makes things worse and try to keep it from getting worse.    Will send to Dr. Singer to review and advise.    Sweetie Hernandez RN  Rheumatology Clinic

## 2020-11-02 NOTE — PROGRESS NOTES
Infusion Nursing Note:  Reuben Padilla presents today for Cycle 30 Day 1 Keytruda.    Patient seen by provider today: Yes: Dr. Dee   present during visit today: Not Applicable.    Note: VSS, denies pain. Pt saw Dr Dee today and denies any new shortness of breath, fevers, chest pain, numbness or tingling. No changes with voiding/bowels or change in appetite. Tolerating current regimen well.     Intravenous Access:  Implanted Port    Treatment Conditions:  Lab Results   Component Value Date    HGB 15.3 11/02/2020     Lab Results   Component Value Date    WBC 2.7 11/02/2020      Lab Results   Component Value Date    ANEU 1.3 11/02/2020     Lab Results   Component Value Date     11/02/2020      Lab Results   Component Value Date     11/02/2020                   Lab Results   Component Value Date    POTASSIUM 3.6 11/02/2020           Lab Results   Component Value Date    MAG 1.9 11/07/2018            Lab Results   Component Value Date    CR 0.92 11/02/2020                   Lab Results   Component Value Date    NIDHI 8.8 11/02/2020                Lab Results   Component Value Date    BILITOTAL 1.3 11/02/2020           Lab Results   Component Value Date    ALBUMIN 3.8 11/02/2020                    Lab Results   Component Value Date    ALT 28 11/02/2020           Lab Results   Component Value Date    AST 18 11/02/2020       Results reviewed, labs MET treatment parameters, ok to proceed with treatment.    Post Infusion Assessment:  Patient tolerated infusion without incident.  Blood return noted pre and post infusion.  Access discontinued per protocol.       Discharge Plan:   Patient declined prescription refills.  AVS to patient via [facility].  Next appointment pending-  Message sent to scheduling to [facility]  Departure Mode: Ambulatory.  Face to Face time: 0.    Alley Bennett RN

## 2020-11-02 NOTE — NURSING NOTE
"Oncology Rooming Note    November 2, 2020 10:49 AM   Reuben Padilla is a 60 year old male who presents for:    Chief Complaint   Patient presents with     Oncology Clinic Visit     Esophageal Cancer     Initial Vitals: BP (!) 136/94   Pulse 82   Resp 18   Ht 1.979 m (6' 5.9\")   Wt (!) 163.7 kg (361 lb)   SpO2 95%   BMI 41.82 kg/m   Estimated body mass index is 41.82 kg/m  as calculated from the following:    Height as of this encounter: 1.979 m (6' 5.9\").    Weight as of this encounter: 163.7 kg (361 lb). Body surface area is 3 meters squared.  Mild Pain (2) Comment: Data Unavailable   No LMP for male patient.  Allergies reviewed: Yes  Medications reviewed: Yes    Medications: Medication refills not needed today.  Pharmacy name entered into EPIC:    [id number] - [place], [place] - [address] [facility] [address] [place], [place] - [address] [facility] - [facility] [address]    Clinical concerns: No new concerns today. Dr. Dee was notified.      Meredith Palma MA            "

## 2020-11-02 NOTE — PROGRESS NOTES
ANTICOAGULATION FOLLOW-UP CLINIC VISIT    Patient Name:  Reuben Padilla  Date:  2020  Contact Type:  Telephone    SUBJECTIVE:  Patient Findings     Comments:  Spoke with Levi.  He reports he has not had changes in health, diet, medications.  Recommended he recheck INR in 4 weeks, but he refuses.  He states he will recheck INR in 6 weeks when he has other labs.  He states if he has any labs sooner, he will recheck INR then.        Clinical Outcomes     Comments:  Spoke with Levi.  He reports he has not had changes in health, diet, medications.  Recommended he recheck INR in 4 weeks, but he refuses.  He states he will recheck INR in 6 weeks when he has other labs.  He states if he has any labs sooner, he will recheck INR then.           OBJECTIVE    Recent labs: (last 7 days)     20  0717   INR 2.74*       ASSESSMENT / PLAN  INR assessment THER    Recheck INR In: 6 WEEKS    INR Location Clinic      Anticoagulation Summary  As of 2020    INR goal:  2.0-3.0   TTR:  63.1 % (7.6 mo)   INR used for dosin.74 (2020)   Warfarin maintenance plan:  5 mg (5 mg x 1) every day   Full warfarin instructions:  5 mg every day   Weekly warfarin total:  35 mg   No change documented:  Lorenza Barlow RN   Plan last modified:  Delfina Thomas RN (2020)   Next INR check:  2020   Priority:  Maintenance   Target end date:  Indefinite    Indications    Acute pulmonary embolism (H) [I26.99]  Hx of pulmonary embolus [Z86.711]  Other acute pulmonary embolism without acute cor pulmonale (H) [I26.99]             Anticoagulation Episode Summary     INR check location:      Preferred lab:      Send INR reminders to:  [facility]    Comments:  [facility] (P)[phone] (F)[phone], Takes Warfarin in the am      Anticoagulation Care Providers     Provider Role Specialty Phone number    Kadi Dee MD Referring Hematology & Oncology [phone]            See the Encounter Report to  view Anticoagulation Flowsheet and Dosing Calendar (Go to Encounters tab in chart review, and find the Anticoagulation Therapy Visit)    Spoke with Levi.      Lorenza Barlow RN

## 2020-11-05 NOTE — TELEPHONE ENCOUNTER
Michelle Singer MD Blaes, Anne Hudson, MD; Hernandez, Sweetie, RN   Caller: Unspecified (3 days ago,  1:17 PM)             I sent a new Rx for actemra qwk. He would need CBC diff, AST, ALT 4 wk after the change; however I see they get frequently done. Thanks      Called and spoke with patient, he is aware of change.  He is aware to get labs in 4 weeks.  He will be here in 3 weeks, for his next Keytruda.  Will get labs then.    Sweetie Hernandez RN  Rheumatology Clinic

## 2020-11-08 DIAGNOSIS — C16.9 METASTASIS FROM GASTRIC CANCER (H): Primary | ICD-10-CM

## 2020-11-08 DIAGNOSIS — C15.5 CANCER OF DISTAL THIRD OF ESOPHAGUS (H): ICD-10-CM

## 2020-11-08 DIAGNOSIS — C79.9 METASTASIS FROM GASTRIC CANCER (H): Primary | ICD-10-CM

## 2020-11-09 ENCOUNTER — PATIENT OUTREACH (OUTPATIENT)
Dept: ONCOLOGY | Facility: CLINIC | Age: 60
End: 2020-11-09

## 2020-11-09 NOTE — PROGRESS NOTES
Writer placed outgoing fax to [place] for PT referral per the request of Dr Dee. Screen shot of fax confirmation below.

## 2020-11-19 NOTE — TELEPHONE ENCOUNTER
RECORDS RECEIVED FROM: orthotics evaluation / per pt calling / previously had orthotics with [facility] in [facility], but then got diagnosed with cancer, and is still around longer than was told.    DATE RECEIVED: Dec 14, 2020   NOTES STATUS DETAILS   OFFICE NOTE from referring provider Internal    OFFICE NOTE from other specialist N/A    DISCHARGE SUMMARY from hospital N/A    DISCHARGE REPORT from the ER N/A    OPERATIVE REPORT N/A    MEDICATION LIST Internal    IMPLANT RECORD/STICKER N/A    LABS     CBC/DIFF N/A    CULTURES N/A    INJECTIONS DONE IN RADIOLOGY N/A    MRI N/A    CT SCAN N/A    XRAYS (IMAGES & REPORTS) N/A    TUMOR     PATHOLOGY  Slides & report N/A    11/19/20   2:58 PM   Complete  Veronica Rojas CMA

## 2020-11-20 DIAGNOSIS — M06.00 SERONEGATIVE RHEUMATOID ARTHRITIS (H): ICD-10-CM

## 2020-11-20 RX ORDER — TOCILIZUMAB 180 MG/ML
0.9 INJECTION, SOLUTION SUBCUTANEOUS
Qty: 3.6 ML | Refills: 4 | Status: SHIPPED | OUTPATIENT
Start: 2020-11-20 | End: 2021-02-03

## 2020-11-20 NOTE — PROGRESS NOTES
Syringe was ordered instead of autoinjector.  Have sent autoinjector order to [facility] [place].    Pt will use syringe for this month and then will switch back to the autoinjector.      Sweetie Hernandez RN  Rheumatology Clinic

## 2020-11-23 ENCOUNTER — VIRTUAL VISIT (OUTPATIENT)
Dept: ONCOLOGY | Facility: CLINIC | Age: 60
End: 2020-11-23
Attending: PHYSICIAN ASSISTANT
Payer: MEDICARE

## 2020-11-23 ENCOUNTER — ANTICOAGULATION THERAPY VISIT (OUTPATIENT)
Dept: ANTICOAGULATION | Facility: CLINIC | Age: 60
End: 2020-11-23

## 2020-11-23 VITALS
HEART RATE: 89 BPM | RESPIRATION RATE: 16 BRPM | TEMPERATURE: 97.6 F | BODY MASS INDEX: 41.4 KG/M2 | DIASTOLIC BLOOD PRESSURE: 90 MMHG | SYSTOLIC BLOOD PRESSURE: 137 MMHG | WEIGHT: 315 LBS | OXYGEN SATURATION: 98 %

## 2020-11-23 DIAGNOSIS — I26.99 OTHER ACUTE PULMONARY EMBOLISM WITHOUT ACUTE COR PULMONALE (H): ICD-10-CM

## 2020-11-23 DIAGNOSIS — Z86.711 HX OF PULMONARY EMBOLUS: ICD-10-CM

## 2020-11-23 DIAGNOSIS — C15.5 CANCER OF DISTAL THIRD OF ESOPHAGUS (H): Primary | ICD-10-CM

## 2020-11-23 DIAGNOSIS — C78.7 MALIGNANT NEOPLASM METASTATIC TO LIVER (H): ICD-10-CM

## 2020-11-23 DIAGNOSIS — M25.50 MULTIPLE JOINT PAIN: ICD-10-CM

## 2020-11-23 DIAGNOSIS — M19.90 INFLAMMATORY ARTHRITIS: ICD-10-CM

## 2020-11-23 DIAGNOSIS — I26.99 ACUTE PULMONARY EMBOLISM (H): ICD-10-CM

## 2020-11-23 DIAGNOSIS — C79.9 METASTASIS FROM GASTRIC CANCER (H): ICD-10-CM

## 2020-11-23 DIAGNOSIS — E66.01 MORBID OBESITY (H): ICD-10-CM

## 2020-11-23 DIAGNOSIS — C16.9 METASTASIS FROM GASTRIC CANCER (H): ICD-10-CM

## 2020-11-23 LAB
ALBUMIN SERPL-MCNC: 3.8 G/DL (ref 3.4–5)
ALP SERPL-CCNC: 75 U/L (ref 40–150)
ALT SERPL W P-5'-P-CCNC: 34 U/L (ref 0–70)
ANION GAP SERPL CALCULATED.3IONS-SCNC: 5 MMOL/L (ref 3–14)
AST SERPL W P-5'-P-CCNC: 22 U/L (ref 0–45)
BASOPHILS # BLD AUTO: 0 10E9/L (ref 0–0.2)
BASOPHILS NFR BLD AUTO: 0.3 %
BILIRUB SERPL-MCNC: 1 MG/DL (ref 0.2–1.3)
BUN SERPL-MCNC: 14 MG/DL (ref 7–30)
CALCIUM SERPL-MCNC: 8.8 MG/DL (ref 8.5–10.1)
CHLORIDE SERPL-SCNC: 109 MMOL/L (ref 94–109)
CO2 SERPL-SCNC: 25 MMOL/L (ref 20–32)
CREAT SERPL-MCNC: 0.88 MG/DL (ref 0.66–1.25)
DIFFERENTIAL METHOD BLD: ABNORMAL
EOSINOPHIL # BLD AUTO: 0 10E9/L (ref 0–0.7)
EOSINOPHIL NFR BLD AUTO: 1 %
ERYTHROCYTE [DISTWIDTH] IN BLOOD BY AUTOMATED COUNT: 12.2 % (ref 10–15)
GFR SERPL CREATININE-BSD FRML MDRD: >90 ML/MIN/{1.73_M2}
GLUCOSE SERPL-MCNC: 109 MG/DL (ref 70–99)
HCT VFR BLD AUTO: 48.6 % (ref 40–53)
HGB BLD-MCNC: 16.5 G/DL (ref 13.3–17.7)
IMM GRANULOCYTES # BLD: 0 10E9/L (ref 0–0.4)
IMM GRANULOCYTES NFR BLD: 0.3 %
INR PPP: 2.92 (ref 0.86–1.14)
LYMPHOCYTES # BLD AUTO: 1 10E9/L (ref 0.8–5.3)
LYMPHOCYTES NFR BLD AUTO: 33 %
MCH RBC QN AUTO: 30.4 PG (ref 26.5–33)
MCHC RBC AUTO-ENTMCNC: 34 G/DL (ref 31.5–36.5)
MCV RBC AUTO: 90 FL (ref 78–100)
MONOCYTES # BLD AUTO: 0.3 10E9/L (ref 0–1.3)
MONOCYTES NFR BLD AUTO: 8.7 %
NEUTROPHILS # BLD AUTO: 1.8 10E9/L (ref 1.6–8.3)
NEUTROPHILS NFR BLD AUTO: 56.7 %
NRBC # BLD AUTO: 0 10*3/UL
NRBC BLD AUTO-RTO: 0 /100
PLATELET # BLD AUTO: 132 10E9/L (ref 150–450)
POTASSIUM SERPL-SCNC: 4 MMOL/L (ref 3.4–5.3)
PROT SERPL-MCNC: 7.2 G/DL (ref 6.8–8.8)
RBC # BLD AUTO: 5.42 10E12/L (ref 4.4–5.9)
SODIUM SERPL-SCNC: 139 MMOL/L (ref 133–144)
TSH SERPL DL<=0.005 MIU/L-ACNC: 1.51 MU/L (ref 0.4–4)
WBC # BLD AUTO: 3.1 10E9/L (ref 4–11)

## 2020-11-23 PROCEDURE — 96413 CHEMO IV INFUSION 1 HR: CPT

## 2020-11-23 PROCEDURE — 85610 PROTHROMBIN TIME: CPT | Performed by: INTERNAL MEDICINE

## 2020-11-23 PROCEDURE — 85025 COMPLETE CBC W/AUTO DIFF WBC: CPT | Performed by: INTERNAL MEDICINE

## 2020-11-23 PROCEDURE — 250N000011 HC RX IP 250 OP 636: Performed by: PHYSICIAN ASSISTANT

## 2020-11-23 PROCEDURE — 99214 OFFICE O/P EST MOD 30 MIN: CPT | Mod: 95 | Performed by: PHYSICIAN ASSISTANT

## 2020-11-23 PROCEDURE — 80053 COMPREHEN METABOLIC PANEL: CPT | Performed by: INTERNAL MEDICINE

## 2020-11-23 PROCEDURE — 84443 ASSAY THYROID STIM HORMONE: CPT | Performed by: INTERNAL MEDICINE

## 2020-11-23 PROCEDURE — 258N000003 HC RX IP 258 OP 636: Performed by: PHYSICIAN ASSISTANT

## 2020-11-23 RX ORDER — HEPARIN SODIUM (PORCINE) LOCK FLUSH IV SOLN 100 UNIT/ML 100 UNIT/ML
500 SOLUTION INTRAVENOUS ONCE
Status: COMPLETED | OUTPATIENT
Start: 2020-11-23 | End: 2020-11-23

## 2020-11-23 RX ORDER — EPINEPHRINE 0.3 MG/.3ML
0.3 INJECTION SUBCUTANEOUS EVERY 5 MIN PRN
Status: CANCELLED | OUTPATIENT
Start: 2020-11-26

## 2020-11-23 RX ORDER — MEPERIDINE HYDROCHLORIDE 25 MG/ML
25 INJECTION INTRAMUSCULAR; INTRAVENOUS; SUBCUTANEOUS EVERY 30 MIN PRN
Status: CANCELLED | OUTPATIENT
Start: 2020-11-26

## 2020-11-23 RX ORDER — EPINEPHRINE 1 MG/ML
0.3 INJECTION, SOLUTION INTRAMUSCULAR; SUBCUTANEOUS EVERY 5 MIN PRN
Status: CANCELLED | OUTPATIENT
Start: 2020-11-26

## 2020-11-23 RX ORDER — ALBUTEROL SULFATE 0.83 MG/ML
2.5 SOLUTION RESPIRATORY (INHALATION)
Status: CANCELLED | OUTPATIENT
Start: 2020-11-26

## 2020-11-23 RX ORDER — SODIUM CHLORIDE 9 MG/ML
1000 INJECTION, SOLUTION INTRAVENOUS CONTINUOUS PRN
Status: CANCELLED
Start: 2020-11-26

## 2020-11-23 RX ORDER — HEPARIN SODIUM (PORCINE) LOCK FLUSH IV SOLN 100 UNIT/ML 100 UNIT/ML
5 SOLUTION INTRAVENOUS ONCE
Status: COMPLETED | OUTPATIENT
Start: 2020-11-23 | End: 2020-11-23

## 2020-11-23 RX ORDER — LORAZEPAM 2 MG/ML
0.5 INJECTION INTRAMUSCULAR EVERY 4 HOURS PRN
Status: CANCELLED
Start: 2020-11-26

## 2020-11-23 RX ORDER — METHYLPREDNISOLONE SODIUM SUCCINATE 125 MG/2ML
125 INJECTION, POWDER, LYOPHILIZED, FOR SOLUTION INTRAMUSCULAR; INTRAVENOUS
Status: CANCELLED
Start: 2020-11-26

## 2020-11-23 RX ORDER — DIPHENHYDRAMINE HYDROCHLORIDE 50 MG/ML
50 INJECTION INTRAMUSCULAR; INTRAVENOUS
Status: CANCELLED
Start: 2020-11-26

## 2020-11-23 RX ORDER — HEPARIN SODIUM (PORCINE) LOCK FLUSH IV SOLN 100 UNIT/ML 100 UNIT/ML
500 SOLUTION INTRAVENOUS ONCE
Status: CANCELLED | OUTPATIENT
Start: 2020-11-26

## 2020-11-23 RX ORDER — ALBUTEROL SULFATE 90 UG/1
1-2 AEROSOL, METERED RESPIRATORY (INHALATION)
Status: CANCELLED
Start: 2020-11-26

## 2020-11-23 RX ADMIN — Medication 5 ML: at 12:40

## 2020-11-23 RX ADMIN — SODIUM CHLORIDE 200 MG: 9 INJECTION, SOLUTION INTRAVENOUS at 15:06

## 2020-11-23 RX ADMIN — Medication 500 UNITS: at 15:44

## 2020-11-23 RX ADMIN — SODIUM CHLORIDE 250 ML: 9 INJECTION, SOLUTION INTRAVENOUS at 15:04

## 2020-11-23 ASSESSMENT — PAIN SCALES - GENERAL: PAINLEVEL: NO PAIN (0)

## 2020-11-23 NOTE — PROGRESS NOTES
"Reuben Padilla is a 60 year old male who is being evaluated via a billable telephone visit.      The patient has been notified of following:     \"This telephone visit will be conducted via a call between you and your physician/provider. We have found that certain health care needs can be provided without the need for a physical exam.  This service lets us provide the care you need with a short phone conversation.  If a prescription is necessary we can send it directly to your pharmacy.  If lab work is needed we can place an order for that and you can then stop by our lab to have the test done at a later time.    Telephone visits are billed at different rates depending on your insurance coverage. During this emergency period, for some insurers they may be billed the same as an in-person visit.  Please reach out to your insurance provider with any questions.    If during the course of the call the physician/provider feels a telephone visit is not appropriate, you will not be charged for this service.\"    Patient has given verbal consent for Telephone visit?  Yes    What phone number would you like to be contacted at? [phone]    How would you like to obtain your AVS? 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 His tumor is positive for cytokeratin 20, negative for P63 and CK7, and HER2 is negative. He started on FOLFOX (5FU/oxaliplatin) on 5/15/2017. He had a delay in treatment from 9/5-10/2/17 due to work related injury.     He had an excellent response by imaging throughout the summer 2017.    He a mixed response by imaging in late fall 2017.    In January 2018, he was " switched to taxol and cyramza - had slight progression and neuropathy and clinical trial became available.        In March 2018, he was started on the [facility] Match Clinical Trial with crizotinib.  (MET amplification) He remained on crizotinib from March - July 2018.     August 2018, he was switched back to taxol/cramza.  In October 2018, he was switched to Keytruda (PD1 overexpressor).              -April 2019, his infusion was held for three weeks, and he was treated with prednisone and Enbrel for grade 3 arthralgias. Keytruda was resumed              -May 2020 he started Actemra (5/14).  This was initially WEEKLY and then in August- moved to QOW (secondary to fatigue and concerns for low WBC).                -August 2020 moved to q6 week Keytruda (still at the 200 mg dose)   -November 2020 moved back to q3w Keytruda     INTERVAL HISTORY:   Levi had been on Keytruda every six weeks now (this started 8/10/20) however his last CT showed some mild progression in the primary tumor.  Levi had been having some occ dysphagia- thus his Keytruda was moved back to every 3 weeks. He tolerated this last 3 week interval well.  His energy levels are stable, no major changes in his fatigue or stamina.  Trying to ride his bike still and is doing PT- feels motivated that this will all help.  He is trying be more active.  He tried the keto x 7 weeks and hardly lost weigh so was frustrated with this.  Hoping the increase in activity will help.  Otherwise no major f/s/c or infection concerns.       He has had no issues with fevers or chills, no chest pain or shortness of breath, no nausea, vomiting, diarrhea or constipation.  No rash       He has no abdminal pain.  No n/v/d/c.   Neuropathy is well controlled and in his feet, stable.  Occ has some balance issues.      He is on Coumadin now. No bruising or bleeding, except his belly button itched the other day and there was dry blood there, no injury. His INR is being managed in [place].          ROS: 10 point ROS neg other than the symptoms noted above in the HPI.     PHYSICAL EXAMINATION  BP (!) 137/90   Pulse 89   Temp 97.6  F (36.4  C) (Oral)   Resp 16   Wt (!) 162.1 kg (357 lb 4.8 oz)   SpO2 98%   BMI 41.40 kg/m    Wt Readings from Last 4 Encounters:   11/23/20 (!) 162.1 kg (357 lb 4.8 oz)   11/02/20 (!) 163.7 kg (361 lb)   11/02/20 (!) 163.7 kg (361 lb)   09/21/20 (!) 159.4 kg (351 lb 8 oz)     Voice is clear and strong. No audible stridor, wheezing, or respiratory distress. The remainder of PE was deferred due to PHE.         Labs:      9/21/2020 07:35 11/2/2020 07:17 11/23/2020 12:40   WBC 2.5 (L) 2.7 (L) 3.1 (L)   Hemoglobin 15.3 15.3 16.5   Hematocrit 44.4 45.3 48.6   Platelet Count 126 (L) 110 (L) 132 (L)   RBC Count 4.90 4.91 5.42   MCV 91 92 90   MCH 31.2 31.2 30.4   MCHC 34.5 33.8 34.0   RDW 14.1 12.3 12.2   Diff Method Automated Method Automated Method Automated Method   % Neutrophils 50.3 49.0 56.7   % Lymphocytes 36.7 40.0 33.0   % Monocytes 10.2 8.7 8.7   % Eosinophils 1.6 1.5 1.0   % Basophils 0.8 0.8 0.3   % Immature Granulocytes 0.4 0.0 0.3   Nucleated RBCs 0 0 0   Absolute Neutrophil 1.2 (L) 1.3 (L) 1.8        11/23/2020 12:40   Sodium 139   Potassium 4.0   Chloride 109   Carbon Dioxide 25   Urea Nitrogen 14   Creatinine 0.88   GFR Estimate >90   GFR Estimate If Black >90   Calcium 8.8   Anion Gap 5   Albumin 3.8   Protein Total 7.2   Bilirubin Total 1.0   Alkaline Phosphatase 75   ALT 34   AST 22   TSH 1.51   Glucose 109 (H)       IMPRESSION/PLAN:  1. Metastatic esophogeal adenocarcinoma. He has stable disease on Keytruda, for TWO years now, which is great news.   His last CT scan was stable.  Given ongoing arthralgias and fatigue and stable disease, his Keytruda was dropped back to every 6 weeks, still at the 200 mg dose.  He saw Dr Dee in early November and his CT was mildly progressed.  His dose was changed back to 200 mg every 3 weeks (verses 6 weeks). He is  clinically stable- having some dysphagia intermittently, but this is not increasing in frequency or intensity. He discussed moving up his imaging, but we discussed closely monitoring his symptoms and moving up imaging if we are concerned.      2. Immune related arthralgias- still thenar atrophy in his hands, but good strength.  No actual pain, just stiffness.  He tapered off the prednisone in October and recently moved to weekly Actemra.  Stable as of today.       3. H/o PE 2018.   He has progressed through Xarelto in the past (didn't take it regularly).  He was then on lovenox but his insurance changed and this became unaffordable. He is now on Coumadin and labs drawn  at [facility] in [place] fax ([phone]).  He is monitored by our coumadin clinic.     4. Baseline neuropathy and got worse with chemo and is on gabapentin BID, its primarily sensory in his feet only.  Has some balance issues on occasion, likely 2/2 to his weakness.       5. Fatigue- TSH, Cortisol normal. Insomnia could be contributing to fatigue. Sleep is off/on.  He is working on exercise currently.          Phone call duration: 20minutes    Loraine Sutherland PA-C

## 2020-11-23 NOTE — PROGRESS NOTES
ANTICOAGULATION FOLLOW-UP CLINIC VISIT    Patient Name:  Reuben Padilla  Date:  2020  Contact Type:  Telephone    SUBJECTIVE:         OBJECTIVE    Recent labs: (last 7 days)     20  1240   INR 2.92*       ASSESSMENT / PLAN  INR assessment THER    Recheck INR In: 6 WEEKS    INR Location Clinic      Anticoagulation Summary  As of 2020    INR goal:  2.0-3.0   TTR:  66.2 % (8.3 mo)   INR used for dosin.92 (2020)   Warfarin maintenance plan:  5 mg (5 mg x 1) every day   Full warfarin instructions:  5 mg every day   Weekly warfarin total:  35 mg   Plan last modified:  Delfina Thomas RN (2020)   Next INR check:  2021   Priority:  Maintenance   Target end date:  Indefinite    Indications    Acute pulmonary embolism (H) [I26.99]  Hx of pulmonary embolus [Z86.711]  Other acute pulmonary embolism without acute cor pulmonale (H) [I26.99]             Anticoagulation Episode Summary     INR check location:      Preferred lab:      Send INR reminders to:  [facility]    Comments:  [facility] (P)[phone] (F)[phone], Takes Warfarin in the am      Anticoagulation Care Providers     Provider Role Specialty Phone number    Kadi Dee MD Referring Hematology & Oncology [phone]            See the Encounter Report to view Anticoagulation Flowsheet and Dosing Calendar (Go to Encounters tab in chart review, and find the Anticoagulation Therapy Visit)  Spoke with patient.    Alley Alarcon RN

## 2020-11-23 NOTE — PROGRESS NOTES
Infusion Nursing Note:  Reuben Padilla presents today for Day 1 Cycle 31 Keytruda.    Patient seen by provider today: Yes: SERGIO Barker   present during visit today: Not Applicable.    Note: N/A.    Intravenous Access:  Implanted Port.    Treatment Conditions:  Lab Results   Component Value Date    HGB 16.5 11/23/2020     Lab Results   Component Value Date    WBC 3.1 11/23/2020      Lab Results   Component Value Date    ANEU 1.8 11/23/2020     Lab Results   Component Value Date     11/23/2020      Lab Results   Component Value Date     11/23/2020                   Lab Results   Component Value Date    POTASSIUM 4.0 11/23/2020           Lab Results   Component Value Date    MAG 1.9 11/07/2018            Lab Results   Component Value Date    CR 0.88 11/23/2020                   Lab Results   Component Value Date    NIDHI 8.8 11/23/2020                Lab Results   Component Value Date    BILITOTAL 1.0 11/23/2020           Lab Results   Component Value Date    ALBUMIN 3.8 11/23/2020                    Lab Results   Component Value Date    ALT 34 11/23/2020           Lab Results   Component Value Date    AST 22 11/23/2020       Results reviewed, labs MET treatment parameters, ok to proceed with treatment.    Post Infusion Assessment:  Patient tolerated infusion without incident.  Blood return noted pre and post infusion.  Access discontinued per protocol.     Discharge Plan:   Patient declined prescription refills.  AVS to patient via [employer].  Patient will return 12/14 for next appointment.   Patient discharged in stable condition accompanied by: self.  Departure Mode: Ambulatory.    Lori Hsieh RN

## 2020-11-23 NOTE — PATIENT INSTRUCTIONS
Contact Numbers  [facility]: [phone] (for symptom and scheduling needs)    Please call the [facility] Triage line if you experience a temperature greater than or equal to 100.4, shaking chills, have uncontrolled nausea, vomiting and/or diarrhea, dizziness, shortness of breath, chest pain, bleeding, unexplained bruising, or if you have any other new/concerning symptoms, questions or concerns.     If you are having any concerning symptoms or wish to speak to a provider before your next infusion visit, please call your care coordinator or triage to notify them so we can adequately serve you.     If you need a refill on a narcotic prescription or other medication, please call triage before your infusion appointment.          November 2020 Sunday Monday Tuesday Wednesday Thursday Friday Saturday   1     2    [facility] DRAW   7:00 AM   (15 min.)    [facility] DRAW   [facility]    CT CHEST/ABDOMEN/PELVIS W   7:25 AM   (20 min.)   [email or web address]   [facility] RETURN  10:45 AM   (30 min.)   Kadi Dee MD   [facility] ONC INFUSION 60  11:30 AM   (60 min.)   [facility] ONCOLOGY INFUSION   [facility] 3     4     5     6     7       8     9     10     11     12     13     14       15     16     17     18     19     20     21       22     23    LAB CENTRAL  12:00 PM   (15 min.)   [facility] DRAW   [facility]    TELEPHONE VISIT RETURN  12:30 PM   (50 min.)   Loraine Sutherland PA-C   [facility] ONC INFUSION 60   2:00 PM   (60 min.)   [facility] ONCOLOGY INFUSION   [facility] 24     25     26     27     28       29     30 December 2020 Sunday Monday Tuesday Wednesday Thursday Friday Saturday             1     2     3     4     5       6      7     8     9     10     11     12       13     14    [facility] NEW FOOT/ANKLE     8:45 AM   (40 min.)   Arnold Forbes DPM   [facility]    LAB CENTRAL  10:30 AM   (15 min.)   [facility] LAB DRAW   [facility] RETURN  10:55 AM   (50 min.)   Loraine Sutherland PA-C   [facility] ONC INFUSION 60  12:00 PM   (60 min.)    ONCOLOGY INFUSION   [facility] 15     16     17     18     19       20     21     22     23     24     25     26       27     28     29     30     31                               Lab Results:  Recent Results (from the past 12 hour(s))   *CBC with platelets differential    Collection Time: 11/23/20 12:40 PM   Result Value Ref Range    WBC 3.1 (L) 4.0 - 11.0 10e9/L    RBC Count 5.42 4.4 - 5.9 10e12/L    Hemoglobin 16.5 13.3 - 17.7 g/dL    Hematocrit 48.6 40.0 - 53.0 %    MCV 90 78 - 100 fl    MCH 30.4 26.5 - 33.0 pg    MCHC 34.0 31.5 - 36.5 g/dL    RDW 12.2 10.0 - 15.0 %    Platelet Count 132 (L) 150 - 450 10e9/L    Diff Method Automated Method     % Neutrophils 56.7 %    % Lymphocytes 33.0 %    % Monocytes 8.7 %    % Eosinophils 1.0 %    % Basophils 0.3 %    % Immature Granulocytes 0.3 %    Nucleated RBCs 0 0 /100    Absolute Neutrophil 1.8 1.6 - 8.3 10e9/L    Absolute Lymphocytes 1.0 0.8 - 5.3 10e9/L    Absolute Monocytes 0.3 0.0 - 1.3 10e9/L    Absolute Eosinophils 0.0 0.0 - 0.7 10e9/L    Absolute Basophils 0.0 0.0 - 0.2 10e9/L    Abs Immature Granulocytes 0.0 0 - 0.4 10e9/L    Absolute Nucleated RBC 0.0    Comprehensive metabolic panel    Collection Time: 11/23/20 12:40 PM   Result Value Ref Range    Sodium 139 133 - 144 mmol/L    Potassium 4.0 3.4 - 5.3 mmol/L    Chloride 109 94 - 109 mmol/L    Carbon Dioxide 25 20 - 32 mmol/L    Anion Gap 5 3 - 14 mmol/L    Glucose 109 (H) 70 - 99 mg/dL    Urea Nitrogen 14 7 - 30 mg/dL    Creatinine 0.88 0.66 - 1.25 mg/dL     GFR Estimate >90 >60 mL/min/[1.73_m2]    GFR Estimate If Black >90 >60 mL/min/[1.73_m2]    Calcium 8.8 8.5 - 10.1 mg/dL    Bilirubin Total 1.0 0.2 - 1.3 mg/dL    Albumin 3.8 3.4 - 5.0 g/dL    Protein Total 7.2 6.8 - 8.8 g/dL    Alkaline Phosphatase 75 40 - 150 U/L    ALT 34 0 - 70 U/L    AST 22 0 - 45 U/L   TSH with free T4 reflex    Collection Time: 11/23/20 12:40 PM   Result Value Ref Range    TSH 1.51 0.40 - 4.00 mU/L   INR    Collection Time: 11/23/20 12:40 PM   Result Value Ref Range    INR 2.92 (H) 0.86 - 1.14

## 2020-11-23 NOTE — NURSING NOTE
"Chief Complaint   Patient presents with     Telephone     ESOPHAGUS CANCER      Port Draw     Labs drawn via PORT by RN. VS taken     Port accessed with 20g 3/4\" gripper needle and labs drawn by rn.  Port flushed with NS and heparin.  Pt tolerated well.  VS taken.  Pt checked in for next appt.    Uche Ovalles RN  "

## 2020-12-07 ASSESSMENT — ENCOUNTER SYMPTOMS
WEIGHT LOSS: 0
MEMORY LOSS: 1
LIGHT-HEADEDNESS: 0
HYPERTENSION: 0
DISTURBANCES IN COORDINATION: 1
NAIL CHANGES: 0
PALPITATIONS: 1
ARTHRALGIAS: 1
FATIGUE: 1
SWOLLEN GLANDS: 0
EYE PAIN: 0
INCREASED ENERGY: 1
TINGLING: 1
DIZZINESS: 1
NECK MASS: 0
ALTERED TEMPERATURE REGULATION: 1
NIGHT SWEATS: 0
DECREASED APPETITE: 0
HYPOTENSION: 0
HEADACHES: 0
PARALYSIS: 0
BACK PAIN: 1
HALLUCINATIONS: 0
BOWEL INCONTINENCE: 0
TASTE DISTURBANCE: 0
MUSCLE WEAKNESS: 1
BLOATING: 0
EYE REDNESS: 1
WEAKNESS: 1
SINUS PAIN: 0
CHILLS: 1
MYALGIAS: 1
HEARTBURN: 0
FEVER: 0
JAUNDICE: 0
DOUBLE VISION: 0
BRUISES/BLEEDS EASILY: 1
ABDOMINAL PAIN: 0
SLEEP DISTURBANCES DUE TO BREATHING: 0
SMELL DISTURBANCE: 0
BLOOD IN STOOL: 0
LEG PAIN: 1
DIARRHEA: 1
NECK PAIN: 1
EYE IRRITATION: 1
EXERCISE INTOLERANCE: 1
HOARSE VOICE: 0
POOR WOUND HEALING: 0
WEIGHT GAIN: 0
SPEECH CHANGE: 0
SKIN CHANGES: 0
SYNCOPE: 0
NUMBNESS: 1
TROUBLE SWALLOWING: 1
SORE THROAT: 0
CONSTIPATION: 0
ORTHOPNEA: 0
POLYDIPSIA: 0
EYE WATERING: 1
LOSS OF CONSCIOUSNESS: 0
NAUSEA: 0
SINUS CONGESTION: 0
MUSCLE CRAMPS: 1
TREMORS: 0
POLYPHAGIA: 0
VOMITING: 0
JOINT SWELLING: 1
SEIZURES: 0
RECTAL PAIN: 0
STIFFNESS: 1

## 2020-12-14 ENCOUNTER — INFUSION THERAPY VISIT (OUTPATIENT)
Dept: ONCOLOGY | Facility: CLINIC | Age: 60
End: 2020-12-14
Attending: PHYSICIAN ASSISTANT
Payer: MEDICARE

## 2020-12-14 ENCOUNTER — APPOINTMENT (OUTPATIENT)
Dept: LAB | Facility: CLINIC | Age: 60
End: 2020-12-14
Attending: INTERNAL MEDICINE
Payer: MEDICARE

## 2020-12-14 ENCOUNTER — OFFICE VISIT (OUTPATIENT)
Dept: ORTHOPEDICS | Facility: CLINIC | Age: 60
End: 2020-12-14
Payer: MEDICARE

## 2020-12-14 ENCOUNTER — ANTICOAGULATION THERAPY VISIT (OUTPATIENT)
Dept: ANTICOAGULATION | Facility: CLINIC | Age: 60
End: 2020-12-14

## 2020-12-14 ENCOUNTER — PRE VISIT (OUTPATIENT)
Dept: ORTHOPEDICS | Facility: CLINIC | Age: 60
End: 2020-12-14

## 2020-12-14 VITALS
RESPIRATION RATE: 16 BRPM | SYSTOLIC BLOOD PRESSURE: 134 MMHG | WEIGHT: 315 LBS | TEMPERATURE: 97.2 F | HEART RATE: 92 BPM | BODY MASS INDEX: 41.37 KG/M2 | DIASTOLIC BLOOD PRESSURE: 88 MMHG | OXYGEN SATURATION: 97 %

## 2020-12-14 VITALS — HEIGHT: 78 IN | BODY MASS INDEX: 36.45 KG/M2 | WEIGHT: 315 LBS

## 2020-12-14 DIAGNOSIS — G62.0 CHEMOTHERAPY-INDUCED NEUROPATHY (H): ICD-10-CM

## 2020-12-14 DIAGNOSIS — I26.99 OTHER ACUTE PULMONARY EMBOLISM WITHOUT ACUTE COR PULMONALE (H): ICD-10-CM

## 2020-12-14 DIAGNOSIS — C15.5 CANCER OF DISTAL THIRD OF ESOPHAGUS (H): Primary | ICD-10-CM

## 2020-12-14 DIAGNOSIS — C16.9 METASTASIS FROM GASTRIC CANCER (H): ICD-10-CM

## 2020-12-14 DIAGNOSIS — T45.1X5A CHEMOTHERAPY-INDUCED NEUTROPENIA (H): ICD-10-CM

## 2020-12-14 DIAGNOSIS — Z86.711 HX OF PULMONARY EMBOLUS: ICD-10-CM

## 2020-12-14 DIAGNOSIS — Z89.9 S/P AMPUTATION: ICD-10-CM

## 2020-12-14 DIAGNOSIS — B35.1 OM (ONYCHOMYCOSIS): ICD-10-CM

## 2020-12-14 DIAGNOSIS — I26.99 ACUTE PULMONARY EMBOLISM (H): ICD-10-CM

## 2020-12-14 DIAGNOSIS — I73.89 OTHER SPECIFIED PERIPHERAL VASCULAR DISEASES (H): ICD-10-CM

## 2020-12-14 DIAGNOSIS — T45.1X5A CHEMOTHERAPY-INDUCED NEUROPATHY (H): ICD-10-CM

## 2020-12-14 DIAGNOSIS — E66.01 MORBID OBESITY (H): ICD-10-CM

## 2020-12-14 DIAGNOSIS — D70.1 CHEMOTHERAPY-INDUCED NEUTROPENIA (H): ICD-10-CM

## 2020-12-14 DIAGNOSIS — C79.9 METASTASIS FROM GASTRIC CANCER (H): ICD-10-CM

## 2020-12-14 DIAGNOSIS — I83.893 VARICOSE VEINS OF BOTH LOWER EXTREMITIES WITH COMPLICATIONS: Primary | ICD-10-CM

## 2020-12-14 LAB
ALBUMIN SERPL-MCNC: 3.8 G/DL (ref 3.4–5)
ALP SERPL-CCNC: 76 U/L (ref 40–150)
ALT SERPL W P-5'-P-CCNC: 32 U/L (ref 0–70)
ANION GAP SERPL CALCULATED.3IONS-SCNC: 7 MMOL/L (ref 3–14)
AST SERPL W P-5'-P-CCNC: 18 U/L (ref 0–45)
BASOPHILS # BLD AUTO: 0 10E9/L (ref 0–0.2)
BASOPHILS NFR BLD AUTO: 1 %
BILIRUB SERPL-MCNC: 1.1 MG/DL (ref 0.2–1.3)
BUN SERPL-MCNC: 13 MG/DL (ref 7–30)
CALCIUM SERPL-MCNC: 8.6 MG/DL (ref 8.5–10.1)
CHLORIDE SERPL-SCNC: 112 MMOL/L (ref 94–109)
CO2 SERPL-SCNC: 24 MMOL/L (ref 20–32)
CREAT SERPL-MCNC: 0.86 MG/DL (ref 0.66–1.25)
DIFFERENTIAL METHOD BLD: ABNORMAL
EOSINOPHIL # BLD AUTO: 0 10E9/L (ref 0–0.7)
EOSINOPHIL NFR BLD AUTO: 1 %
ERYTHROCYTE [DISTWIDTH] IN BLOOD BY AUTOMATED COUNT: 12.2 % (ref 10–15)
GFR SERPL CREATININE-BSD FRML MDRD: >90 ML/MIN/{1.73_M2}
GLUCOSE SERPL-MCNC: 102 MG/DL (ref 70–99)
HCT VFR BLD AUTO: 46 % (ref 40–53)
HGB BLD-MCNC: 16 G/DL (ref 13.3–17.7)
IMM GRANULOCYTES # BLD: 0 10E9/L (ref 0–0.4)
IMM GRANULOCYTES NFR BLD: 0 %
INR PPP: 2.59 (ref 0.86–1.14)
LYMPHOCYTES # BLD AUTO: 1.2 10E9/L (ref 0.8–5.3)
LYMPHOCYTES NFR BLD AUTO: 39.3 %
MCH RBC QN AUTO: 30.5 PG (ref 26.5–33)
MCHC RBC AUTO-ENTMCNC: 34.8 G/DL (ref 31.5–36.5)
MCV RBC AUTO: 88 FL (ref 78–100)
MONOCYTES # BLD AUTO: 0.3 10E9/L (ref 0–1.3)
MONOCYTES NFR BLD AUTO: 8.7 %
NEUTROPHILS # BLD AUTO: 1.5 10E9/L (ref 1.6–8.3)
NEUTROPHILS NFR BLD AUTO: 50 %
NRBC # BLD AUTO: 0 10*3/UL
NRBC BLD AUTO-RTO: 0 /100
PLATELET # BLD AUTO: 109 10E9/L (ref 150–450)
POTASSIUM SERPL-SCNC: 3.9 MMOL/L (ref 3.4–5.3)
PROT SERPL-MCNC: 7 G/DL (ref 6.8–8.8)
RBC # BLD AUTO: 5.25 10E12/L (ref 4.4–5.9)
SODIUM SERPL-SCNC: 142 MMOL/L (ref 133–144)
TSH SERPL DL<=0.005 MIU/L-ACNC: 2 MU/L (ref 0.4–4)
WBC # BLD AUTO: 3 10E9/L (ref 4–11)

## 2020-12-14 PROCEDURE — 99203 OFFICE O/P NEW LOW 30 MIN: CPT | Mod: 25 | Performed by: PODIATRIST

## 2020-12-14 PROCEDURE — 250N000011 HC RX IP 250 OP 636: Performed by: PHYSICIAN ASSISTANT

## 2020-12-14 PROCEDURE — 80053 COMPREHEN METABOLIC PANEL: CPT | Performed by: PHYSICIAN ASSISTANT

## 2020-12-14 PROCEDURE — 11721 DEBRIDE NAIL 6 OR MORE: CPT | Performed by: PODIATRIST

## 2020-12-14 PROCEDURE — 258N000003 HC RX IP 258 OP 636: Performed by: PHYSICIAN ASSISTANT

## 2020-12-14 PROCEDURE — 250N000011 HC RX IP 250 OP 636: Mod: TEL | Performed by: PHYSICIAN ASSISTANT

## 2020-12-14 PROCEDURE — 99214 OFFICE O/P EST MOD 30 MIN: CPT | Performed by: PHYSICIAN ASSISTANT

## 2020-12-14 PROCEDURE — 96413 CHEMO IV INFUSION 1 HR: CPT

## 2020-12-14 PROCEDURE — 84443 ASSAY THYROID STIM HORMONE: CPT | Performed by: PHYSICIAN ASSISTANT

## 2020-12-14 PROCEDURE — 85610 PROTHROMBIN TIME: CPT | Performed by: PHYSICIAN ASSISTANT

## 2020-12-14 PROCEDURE — 85025 COMPLETE CBC W/AUTO DIFF WBC: CPT | Performed by: PHYSICIAN ASSISTANT

## 2020-12-14 RX ORDER — ALBUTEROL SULFATE 90 UG/1
1-2 AEROSOL, METERED RESPIRATORY (INHALATION)
Status: CANCELLED
Start: 2020-12-17

## 2020-12-14 RX ORDER — EPINEPHRINE 0.3 MG/.3ML
0.3 INJECTION SUBCUTANEOUS EVERY 5 MIN PRN
Status: CANCELLED | OUTPATIENT
Start: 2020-12-17

## 2020-12-14 RX ORDER — ALBUTEROL SULFATE 0.83 MG/ML
2.5 SOLUTION RESPIRATORY (INHALATION)
Status: CANCELLED | OUTPATIENT
Start: 2020-12-17

## 2020-12-14 RX ORDER — GABAPENTIN 300 MG/1
CAPSULE ORAL
Qty: 450 CAPSULE | Refills: 3 | Status: SHIPPED | OUTPATIENT
Start: 2020-12-14 | End: 2021-05-26

## 2020-12-14 RX ORDER — LORAZEPAM 2 MG/ML
0.5 INJECTION INTRAMUSCULAR EVERY 4 HOURS PRN
Status: CANCELLED
Start: 2020-12-17

## 2020-12-14 RX ORDER — DIPHENHYDRAMINE HYDROCHLORIDE 50 MG/ML
50 INJECTION INTRAMUSCULAR; INTRAVENOUS
Status: CANCELLED
Start: 2020-12-17

## 2020-12-14 RX ORDER — ZOLPIDEM TARTRATE 10 MG/1
10 TABLET ORAL
Qty: 90 TABLET | Refills: 1 | Status: SHIPPED | OUTPATIENT
Start: 2020-12-14 | End: 2021-02-26

## 2020-12-14 RX ORDER — MEPERIDINE HYDROCHLORIDE 25 MG/ML
25 INJECTION INTRAMUSCULAR; INTRAVENOUS; SUBCUTANEOUS EVERY 30 MIN PRN
Status: CANCELLED | OUTPATIENT
Start: 2020-12-17

## 2020-12-14 RX ORDER — EPINEPHRINE 1 MG/ML
0.3 INJECTION, SOLUTION INTRAMUSCULAR; SUBCUTANEOUS EVERY 5 MIN PRN
Status: CANCELLED | OUTPATIENT
Start: 2020-12-17

## 2020-12-14 RX ORDER — WARFARIN SODIUM 5 MG/1
5 TABLET ORAL DAILY
Qty: 90 TABLET | Refills: 3 | Status: SHIPPED | OUTPATIENT
Start: 2020-12-14 | End: 2021-05-26

## 2020-12-14 RX ORDER — HEPARIN SODIUM (PORCINE) LOCK FLUSH IV SOLN 100 UNIT/ML 100 UNIT/ML
500 SOLUTION INTRAVENOUS ONCE
Status: COMPLETED | OUTPATIENT
Start: 2020-12-14 | End: 2020-12-14

## 2020-12-14 RX ORDER — HEPARIN SODIUM (PORCINE) LOCK FLUSH IV SOLN 100 UNIT/ML 100 UNIT/ML
5 SOLUTION INTRAVENOUS
Status: COMPLETED | OUTPATIENT
Start: 2020-12-14 | End: 2020-12-14

## 2020-12-14 RX ORDER — SODIUM CHLORIDE 9 MG/ML
1000 INJECTION, SOLUTION INTRAVENOUS CONTINUOUS PRN
Status: CANCELLED
Start: 2020-12-17

## 2020-12-14 RX ORDER — METHYLPREDNISOLONE SODIUM SUCCINATE 125 MG/2ML
125 INJECTION, POWDER, LYOPHILIZED, FOR SOLUTION INTRAMUSCULAR; INTRAVENOUS
Status: CANCELLED
Start: 2020-12-17

## 2020-12-14 RX ORDER — HEPARIN SODIUM (PORCINE) LOCK FLUSH IV SOLN 100 UNIT/ML 100 UNIT/ML
500 SOLUTION INTRAVENOUS ONCE
Status: CANCELLED | OUTPATIENT
Start: 2020-12-17 | End: 2020-12-17

## 2020-12-14 RX ADMIN — Medication 5 ML: at 10:41

## 2020-12-14 RX ADMIN — Medication 500 UNITS: at 13:17

## 2020-12-14 RX ADMIN — SODIUM CHLORIDE 200 MG: 9 INJECTION, SOLUTION INTRAVENOUS at 12:33

## 2020-12-14 RX ADMIN — SODIUM CHLORIDE 250 ML: 9 INJECTION, SOLUTION INTRAVENOUS at 12:11

## 2020-12-14 ASSESSMENT — MIFFLIN-ST. JEOR: SCORE: 2553.52

## 2020-12-14 ASSESSMENT — PAIN SCALES - GENERAL: PAINLEVEL: MODERATE PAIN (4)

## 2020-12-14 NOTE — PATIENT INSTRUCTIONS
[facility] Main Line: [phone]    Call triage nurse with chills and/or temperature greater than or equal to 100.4, uncontrolled nausea/vomiting, diarrhea, constipation, dizziness, shortness of breath, chest pain, bleeding, unexplained bruising, or any new/concerning symptoms, questions/concerns.   If you are having any concerning symptoms or wish to speak to a provider before your next infusion visit, please call your care coordinator or triage to notify them so we can adequately serve you.   Triage Nurse Line: [phone]    If after hours, weekends, or holidays [phone]       Dear Patients and Caregivers,    Each day we work diligently to eliminate any barriers to your care including working with your insurance coverage to preauthorize your facility administered medication treatment. Our goal is to be transparent with any anticipated concerns with coverage for your treatment. At the beginning of every year this goal is particularly challenging because of changes to insurance coverage.    How can you help?    Provide any new insurance card to the infusion nurse at your next appointment or enter the information into your [employer] account prior to January 1st 2021.     It is vital that if a  reaches out that you return their phone call in a timely manner. Due to regulations, the team is unable to leave detailed voicemails. When you return the finance teams call they will be able to inform you of the details so a decision about your appointment can be made.    Also please understand:    We go through this process each year and each year offers new challenges.    Insurance companies will not allow the reauthorization process to begin until 2021, not allowing us to work in advance on this process.    Even if you are not changing insurance plans, insurance companies often update their policies requiring all treatments to be reauthorized.    As institutions across the country work  to reauthorize treatments, insurance companies sometimes have longer than usual wait times in their call centers and leads to delayed response to prior authorization requests.     Thank you for your patience as we work this process. We do strive to keep you updated throughout the process if there are any delays or if the process is taking longer than anticipated. Lastly please feel free to speak with one of our infusion finance specialists at your next appointment or via phone ([phone]) if you have any questions. Thank you for choosing [facility] for your care.

## 2020-12-14 NOTE — LETTER
"    12/14/2020         RE: Reuben Padilla  [address] [place] [id number]      Reuben Padilla is a 60 year old male who is being evaluated via a billable telephone visit.      The patient has been notified of following:     \"This telephone visit will be conducted via a call between you and your physician/provider. We have found that certain health care needs can be provided without the need for a physical exam.  This service lets us provide the care you need with a short phone conversation.  If a prescription is necessary we can send it directly to your pharmacy.  If lab work is needed we can place an order for that and you can then stop by our lab to have the test done at a later time.    Telephone visits are billed at different rates depending on your insurance coverage. During this emergency period, for some insurers they may be billed the same as an in-person visit.  Please reach out to your insurance provider with any questions.    If during the course of the call the physician/provider feels a telephone visit is not appropriate, you will not be charged for this service.\"    Patient has given verbal consent for Telephone visit?  Yes    What phone number would you like to be contacted at? [phone]    How would you like to obtain your AVS? MyChart    Patient needs refills for Gabapentin, Coumadin, and Ambien.    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 His tumor is positive for cytokeratin 20, negative for P63 and CK7, and HER2 is negative. He started on FOLFOX (5FU/oxaliplatin) on 5/15/2017. He had a delay in treatment from 9/5-10/2/17 due to work related injury.     He had an excellent response by " imaging throughout the summer 2017.    He a mixed response by imaging in late fall 2017.    In January 2018, he was switched to taxol and cyramza - had slight progression and neuropathy and clinical trial became available.        In March 2018, he was started on the NCI Match Clinical Trial with crizotinib.  (MET amplification) He remained on crizotinib from March - July 2018.     August 2018, he was switched back to taxol/cramza.  In October 2018, he was switched to Keytruda (PD1 overexpressor).              -April 2019, his infusion was held for three weeks, and he was treated with prednisone and Enbrel for grade 3 arthralgias. Keytruda was resumed              -May 2020 he started Actemra (5/14).  This was initially WEEKLY and then in August- moved to QOW (secondary to fatigue and concerns for low WBC).                -August 2020 moved to q6 week Keytruda (still at the 200 mg dose)              -November 2020 moved back to q3w Keytruda     INTERVAL HISTORY:   Levi had been on Keytruda every six weeks now (this started 8/10/20) however his last CT showed some mild progression in the primary tumor.  Levi had been having some occ dysphagia- thus his Keytruda was moved back to every 3 weeks. This is occurring about a few times a week- he is avoiding thicker foods (sandwhiches, etc) but it can happen with liquids as well.  Washing it down with fluids usually helps, so he is tolerating it without concerns.      He tolerated this last 3 week interval well.  He doesn't feel his arthralgias/strength in his hands has worsened with going back to every 3 weeks.  He has been on Actemra weekly now, he is unsure how much this is helping.     His energy levels are stable, no major changes in his fatigue or stamina.  Cut back on his bike this last week, but doing PT  He tried the keto x 7 weeks and hardly lost weigh so was frustrated with this.  Hoping the increase in activity will help, weight is down a few pounds.  Otherwise  no major f/s/c or infection concerns.  Saw the podiatrist today, hoping some orthotics will help.      He has had no issues with fevers or chills, no chest pain or shortness of breath, no nausea, vomiting, diarrhea or constipation.  No rash       He has no abdminal pain.  No n/v/d/c.   Neuropathy is well controlled and in his feet, stable.  Occ has some balance issues.      He is on Coumadin now. His INR is being managed in [place].          ROS: 10 point ROS neg other than the symptoms noted above in the HPI.     PHYSICAL EXAMINATION  /88 (BP Location: Right arm, Patient Position: Sitting, Cuff Size: Adult Large)   Pulse 92   Temp 97.2  F (36.2  C) (Tympanic)   Resp 16   Wt (!) 162.4 kg (358 lb)   SpO2 97%   BMI 41.37 kg/m    Wt Readings from Last 4 Encounters:   12/14/20 (!) 162.4 kg (358 lb)   12/14/20 (!) 161 kg (355 lb)   11/23/20 (!) 162.1 kg (357 lb 4.8 oz)   11/02/20 (!) 163.7 kg (361 lb)       Voice is clear and strong. No audible stridor, wheezing, or respiratory distress. The remainder of PE was deferred due to PHE.          Labs:       11/23/2020 12:40 12/14/2020 10:46   Sodium 139 142   Potassium 4.0 3.9   Chloride 109 112 (H)   Carbon Dioxide 25 24   Urea Nitrogen 14 13   Creatinine 0.88 0.86   GFR Estimate >90 >90   GFR Estimate If Black >90 >90   Calcium 8.8 8.6   Anion Gap 5 7   Albumin 3.8 3.8   Protein Total 7.2 7.0   Bilirubin Total 1.0 1.1   Alkaline Phosphatase 75 76   ALT 34 32   AST 22 18   TSH 1.51 2.00   Glucose 109 (H) 102 (H)   WBC 3.1 (L) 3.0 (L)   Hemoglobin 16.5 16.0   Hematocrit 48.6 46.0   Platelet Count 132 (L) 109 (L)   RBC Count 5.42 5.25   MCV 90 88   MCH 30.4 30.5   MCHC 34.0 34.8   RDW 12.2 12.2   Diff Method Automated Method Automated Method   % Neutrophils 56.7 50.0   % Lymphocytes 33.0 39.3   % Monocytes 8.7 8.7   % Eosinophils 1.0 1.0   % Basophils 0.3 1.0   % Immature Granulocytes 0.3 0.0   Nucleated RBCs 0 0   Absolute Neutrophil 1.8 1.5 (L)   Absolute  Lymphocytes 1.0 1.2   Absolute Monocytes 0.3 0.3   Absolute Eosinophils 0.0 0.0   Absolute Basophils 0.0 0.0   Abs Immature Granulocytes 0.0 0.0   Absolute Nucleated RBC 0.0 0.0   INR 2.92 (H) 2.59 (H)          IMPRESSION/PLAN:  1. Metastatic esophogeal adenocarcinoma. He has stable disease on Keytruda, for TWO years now, which is great news.  Given ongoing arthralgias and fatigue and stable disease, his Keytruda was dropped back to every 6 weeks, still at the 200 mg dose.  He saw Dr Dee in early November and his CT was mildly progressed.  His dose was changed back to 200 mg every 3 weeks (verses 6 weeks). He is clinically stable- having some dysphagia intermittently, but this is not increasing in frequency or intensity. He discussed moving up his imaging, but we discussed closely monitoring his symptoms and moving up imaging if we are concerned.   -OK for C32     2. Immune related arthralgias- still thenar atrophy in his hands, but good strength.  No actual pain, just stiffness.  He tapered off the prednisone in October and recently moved to weekly Actemra.  Stable as of today.  No worsening with increasing the Keytruda back to q3w doses.       3. H/o PE 2018.   He has progressed through Xarelto in the past (didn't take it regularly).  He was then on lovenox but his insurance changed and this became unaffordable. He is now on Coumadin and labs drawn  at [facility] in [facility] ([phone]).  He is monitored by our coumadin clinic.     4. Baseline neuropathy and got worse with chemo and is on gabapentin BID, its primarily sensory in his feet only.  Has some balance issues on occasion, likely 2/2 to his weakness.  Just saw podiatry and has orthotics made.      5. Fatigue- TSH, Cortisol normal. Has been relatively stable, is going to have an active weekend helping friends with a project, so he will push his stamina this weekend.  Is still doing PT 2 x week.              Phone call duration:  20 minutes    Nicloe Sutherland PA-C

## 2020-12-14 NOTE — PROGRESS NOTES
Infusion Nursing Note:  Reuben Padilla presents today for Cycle 32 Day 1 Keytruda.    Patient seen by provider today: Yes: Nicole HILL   present during visit today: Not Applicable.    Note: Evaluated by Nicole HILL pre infusion.  Hand pain discussed in that visit.    Intravenous Access:  Implanted Port.    Treatment Conditions:  Lab Results   Component Value Date    HGB 16.0 12/14/2020     Lab Results   Component Value Date    WBC 3.0 12/14/2020      Lab Results   Component Value Date    ANEU 1.5 12/14/2020     Lab Results   Component Value Date     12/14/2020      Lab Results   Component Value Date     12/14/2020                   Lab Results   Component Value Date    POTASSIUM 3.9 12/14/2020           Lab Results   Component Value Date    MAG 1.9 11/07/2018            Lab Results   Component Value Date    CR 0.86 12/14/2020                   Lab Results   Component Value Date    NIDHI 8.6 12/14/2020                Lab Results   Component Value Date    BILITOTAL 1.1 12/14/2020           Lab Results   Component Value Date    ALBUMIN 3.8 12/14/2020                    Lab Results   Component Value Date    ALT 32 12/14/2020           Lab Results   Component Value Date    AST 18 12/14/2020     TSH   Date Value Ref Range Status   12/14/2020 2.00 0.40 - 4.00 mU/L Final         Results reviewed, labs MET treatment parameters, ok to proceed with treatment.      Post Infusion Assessment:  Patient tolerated infusion without incident.  Blood return noted pre and post infusion.  Site patent and intact, free from redness, edema or discomfort.  No evidence of extravasations.  Access discontinued per protocol.       Discharge Plan:   Patient declined prescription refills.  Per Nicole Sims sent refills to his mail order pharmacy.  AVS to patient via mobiDEOS.  Patient will return 1/4/2021 labs/PA/chemo for next appointment.   Patient discharged in stable condition accompanied by: self.  Departure  Mode: Ambulatory.  Face to Face time: 0.    Yadira Cohn RN

## 2020-12-14 NOTE — PROGRESS NOTES
Date of Service: 12/14/2020    Chief Complaint:   Chief Complaint   Patient presents with     Consult     orthotics evaluation on bilateral feet         HPI: Reuben is a 60 year old male who presents today for further evaluation for orthotics.  He relates that he was meant to have orthotics done about a year ago, however he was diagnosed with esophageal cancer.  He relates he would like these fabricated for him today.  He relates that in the near past, he had a right hallux amputation.  He relates that he had a wound that got infected and had subsequent osteomyelitis.  He relates that on this foot, he does have the sensation that he is rolling outward.    Review of Systems: Answers for HPI/ROS submitted by the patient on 12/7/2020   General Symptoms: Yes  Skin Symptoms: Yes  HENT Symptoms: Yes  EYE SYMPTOMS: Yes  HEART SYMPTOMS: Yes  LUNG SYMPTOMS: No  INTESTINAL SYMPTOMS: Yes  URINARY SYMPTOMS: No  REPRODUCTIVE SYMPTOMS: No  SKELETAL SYMPTOMS: Yes  BLOOD SYMPTOMS: Yes  NERVOUS SYSTEM SYMPTOMS: Yes  MENTAL HEALTH SYMPTOMS: No  Fever: No  Loss of appetite: No  Weight loss: No  Weight gain: No  Fatigue: Yes  Night sweats: No  Chills: Yes  Increased stress: Yes  Excessive hunger: No  Excessive thirst: No  Feeling hot or cold when others believe the temperature is normal: Yes  Loss of height: No  Post-operative complications: No  Surgical site pain: No  Hallucinations: No  Change in or Loss of Energy: Yes  Hyperactivity: No  Confusion: No  Changes in hair: No  Changes in moles/birth marks: No  Itching: No  Rashes: No  Changes in nails: No  Acne: No  Change in facial hair: No  Warts: No  Non-healing sores: No  Scarring: Yes  Flaking of skin: Yes  Color changes of hands/feet in cold : Yes  Sun sensitivity: No  Skin thickening: No  Ear pain: No  Ear discharge: No  Hearing loss: Yes  Tinnitus: Yes  Nosebleeds: No  Congestion: No  Sinus pain: No  Trouble swallowing: Yes   Voice hoarseness: No  Mouth sores: No  Sore throat:  No  Tooth pain: No  Gum tenderness: No  Bleeding gums: No  Change in taste: No  Change in sense of smell: No  Dry mouth: No  Hearing aid used: No  Neck lump: No  Eye pain: No  Vision loss: Yes  Dry eyes: Yes  Watery eyes: Yes  Eye bulging: No  Double vision: No  Flashing of lights: No  Spots: Yes  Floaters: Yes  Redness: Yes  Crossed eyes: No  Tunnel Vision: No  Yellowing of eyes: No  Eye irritation: Yes  Chest pain or pressure: No  Fast or irregular heartbeat: Yes  Pain in legs with walking: Yes  Trouble breathing while lying down: No  Fingers or toes appear blue: No  High blood pressure: No  Low blood pressure: No  Fainting: No  Murmurs: No  Pacemaker: No  Varicose veins: Yes  Edema or swelling: No  Wake up at night with shortness of breath: No  Light-headedness: No  Exercise intolerance: Yes  Heart burn or indigestion: No  Nausea: No  Vomiting: No  Abdominal pain: No  Bloating: No  Constipation: No  Diarrhea: Yes  Blood in stool: No  Black stools: No  Rectal or Anal pain: No  Fecal incontinence: No  Yellowing of skin or eyes: No  Vomit with blood: No  Change in stools: No  Back pain: Yes  Muscle aches: Yes  Neck pain: Yes  Swollen joints: Yes  Joint pain: Yes  Bone pain: Yes  Muscle cramps: Yes  Muscle weakness: Yes  Joint stiffness: Yes  Bone fracture: No  Anemia: No  Swollen glands: No  Easy bleeding or bruising: Yes  Trouble with coordination: Yes  Dizziness or trouble with balance: Yes  Fainting or black-out spells: No  Memory loss: Yes  Headache: No  Seizures: No  Speech problems: No  Tingling: Yes  Tremor: No  Weakness: Yes  Difficulty walking: Yes  Paralysis: No  Numbness: Yes    PMH:   Past Medical History:   Diagnosis Date     Arrhythmia      Cancer (H)     esophageal     Glaucoma      Hypertension      Paroxysmal A-fib (H)      Tubular adenoma 02/2017       PSxH:   Past Surgical History:   Procedure Laterality Date     AMPUTATION      toe     ARTHROSCOPY KNEE       bunion surgery       CHOLECYSTECTOMY        COLONOSCOPY  2017    remove 2 polyps     ESOPHAGOSCOPY, GASTROSCOPY, DUODENOSCOPY (EGD), COMBINED N/A 10/10/2018    Procedure: COMBINED ESOPHAGOSCOPY, GASTROSCOPY, DUODENOSCOPY (EGD);  Esophagogastroduodenoscopy ;  Surgeon: Leonel Joel MD;  Location: [facility] OR     INSERT PORT VASCULAR ACCESS Right 2017    Procedure: INSERT PORT VASCULAR ACCESS;  Single Lumen Chest Power Port;  Surgeon: Jasiel Hu PA-C;  Location: [facility]       Allergies: Penicillin g    SH:   Social History     Socioeconomic History     Marital status: Single     Spouse name: Not on file     Number of children: Not on file     Years of education: Not on file     Highest education level: Not on file   Occupational History     Not on file   Social Needs     Financial resource strain: Not on file     Food insecurity     Worry: Not on file     Inability: Not on file     Transportation needs     Medical: Not on file     Non-medical: Not on file   Tobacco Use     Smoking status: Former Smoker     Packs/day: 1.00     Years: 20.00     Pack years: 20.00     Types: Cigarettes     Quit date: 2006     Years since quittin.9     Smokeless tobacco: Never Used   Substance and Sexual Activity     Alcohol use: Yes     Comment: occasional     Drug use: No     Comment: h/o over 30 years ago     Sexual activity: Not on file   Lifestyle     Physical activity     Days per week: Not on file     Minutes per session: Not on file     Stress: Not on file   Relationships     Social connections     Talks on phone: Not on file     Gets together: Not on file     Attends Worship service: Not on file     Active member of club or organization: Not on file     Attends meetings of clubs or organizations: Not on file     Relationship status: Not on file     Intimate partner violence     Fear of current or ex partner: Not on file     Emotionally abused: Not on file     Physically abused: Not on file     Forced sexual activity: Not on file  "  Other Topics Concern     Parent/sibling w/ CABG, MI or angioplasty before 65F 55M? Not Asked   Social History Narrative     Not on file       FH: No family history on file.    Objective:  Data Unavailable Data Unavailable Data Unavailable Data Unavailable 6' 6\" 355 lbs 0 oz    PT and DP pulses are 2/4 bilaterally. CRT is instant. Diminished pedal hair. Varicosities noted BL.   Gross sensation is diminished bilaterally.  Protective sensation is diminished as tested with a 5.07G monofilament.  Equinus is noted bilaterally. No pain with active or passive ROM of the ankle, MTJ, 1st ray, or halluces bilaterally,.  No pain noted across the foot.  Right hallux amputation noted.  With ambulation he does have slight forefoot varus at the mid stance.  Nails mycotic bilaterally. No open lesions are noted.     Assessment: Levi is a 60-year-old with chemotherapy-induced neuropathy and right hallux amputation.  He would like a pair of inserts molded for him today.  Onychomycosis  PVD    Plan:  - Pt seen and evaluated.  - Orthotics were molded and sent to the lab.  - Nails debrided x 10.   - See again PRN.            "

## 2020-12-14 NOTE — NURSING NOTE
"Reason For Visit:   Chief Complaint   Patient presents with     Consult     orthotics evaluation on bilateral feet        Ht 1.981 m (6' 6\")   Wt (!) 161 kg (355 lb)   BMI 41.02 kg/m      Pain Assessment  Patient Currently in Pain: Yes  0-10 Pain Scale: 2  Primary Pain Location: Hand(not on the feet it's on the hands per pt)    Chio Guzman ATC    "

## 2020-12-14 NOTE — NURSING NOTE
"Chief Complaint   Patient presents with     Telephone     RETURN;      Port Draw     labs drawn from port by rn.  vs taken     Port accessed with 20 gauge 3/4\" gripper needle and labs drawn by rn.  Port flushed with NS and heparin.  Pt tolerated well.  VS taken.     Faye Solano, RN      "

## 2020-12-14 NOTE — PROGRESS NOTES
ANTICOAGULATION FOLLOW-UP CLINIC VISIT    Patient Name:  Reuben Padilla  Date:  2020  Contact Type:  Telephone    SUBJECTIVE:  Patient Findings     Comments:  Spoke with Levi.  He reports he has not had changes in health, diet, or medications.  He states he does not have any signs of bleeding.        Clinical Outcomes     Comments:  Spoke with Levi.  He reports he has not had changes in health, diet, or medications.  He states he does not have any signs of bleeding.           OBJECTIVE    Recent labs: (last 7 days)     20  1046   INR 2.59*       ASSESSMENT / PLAN  INR assessment THER    Recheck INR In: 6 WEEKS    INR Location Clinic      Anticoagulation Summary  As of 2020    INR goal:  2.0-3.0   TTR:  68.8 % (9 mo)   INR used for dosin.59 (2020)   Warfarin maintenance plan:  5 mg (5 mg x 1) every day   Full warfarin instructions:  5 mg every day   Weekly warfarin total:  35 mg   No change documented:  Lorenza Barlow RN   Plan last modified:  Delfina Thomas RN (2020)   Next INR check:  2021   Priority:  Maintenance   Target end date:  Indefinite    Indications    Acute pulmonary embolism (H) [I26.99]  Hx of pulmonary embolus [Z86.711]  Other acute pulmonary embolism without acute cor pulmonale (H) [I26.99]             Anticoagulation Episode Summary     INR check location:      Preferred lab:      Send INR reminders to:  [facility]    Comments:  [facility] (P)[phone] (F)[phone], Takes Warfarin in the am      Anticoagulation Care Providers     Provider Role Specialty Phone number    Kadi Dee MD Referring Hematology & Oncology [phone]            See the Encounter Report to view Anticoagulation Flowsheet and Dosing Calendar (Go to Encounters tab in chart review, and find the Anticoagulation Therapy Visit)    Spoke with Levi.    Lorenza Barlow, RN

## 2020-12-14 NOTE — LETTER
12/14/2020         RE: Reuben Padilla  [address] [facility] [address]        Dear Colleague,    Thank you for referring your patient, Reuben Padilla, to the [facility] [place]. Please see a copy of my visit note below.    Date of Service: 12/14/2020    Chief Complaint:   Chief Complaint   Patient presents with     Consult     orthotics evaluation on bilateral feet         HPI: Reuben is a 60 year old male who presents today for further evaluation for orthotics.  He relates that he was meant to have orthotics done about a year ago, however he was diagnosed with esophageal cancer.  He relates he would like these fabricated for him today.  He relates that in the near past, he had a right hallux amputation.  He relates that he had a wound that got infected and had subsequent osteomyelitis.  He relates that on this foot, he does have the sensation that he is rolling outward.    Review of Systems: Answers for HPI/ROS submitted by the patient on 12/7/2020   General Symptoms: Yes  Skin Symptoms: Yes  HENT Symptoms: Yes  EYE SYMPTOMS: Yes  HEART SYMPTOMS: Yes  LUNG SYMPTOMS: No  INTESTINAL SYMPTOMS: Yes  URINARY SYMPTOMS: No  REPRODUCTIVE SYMPTOMS: No  SKELETAL SYMPTOMS: Yes  BLOOD SYMPTOMS: Yes  NERVOUS SYSTEM SYMPTOMS: Yes  MENTAL HEALTH SYMPTOMS: No  Fever: No  Loss of appetite: No  Weight loss: No  Weight gain: No  Fatigue: Yes  Night sweats: No  Chills: Yes  Increased stress: Yes  Excessive hunger: No  Excessive thirst: No  Feeling hot or cold when others believe the temperature is normal: Yes  Loss of height: No  Post-operative complications: No  Surgical site pain: No  Hallucinations: No  Change in or Loss of Energy: Yes  Hyperactivity: No  Confusion: No  Changes in hair: No  Changes in moles/birth marks: No  Itching: No  Rashes: No  Changes in nails: No  Acne: No  Change in facial hair: No  Warts: No  Non-healing sores: No  Scarring: Yes  Flaking of skin: Yes  Color  changes of hands/feet in cold : Yes  Sun sensitivity: No  Skin thickening: No  Ear pain: No  Ear discharge: No  Hearing loss: Yes  Tinnitus: Yes  Nosebleeds: No  Congestion: No  Sinus pain: No  Trouble swallowing: Yes   Voice hoarseness: No  Mouth sores: No  Sore throat: No  Tooth pain: No  Gum tenderness: No  Bleeding gums: No  Change in taste: No  Change in sense of smell: No  Dry mouth: No  Hearing aid used: No  Neck lump: No  Eye pain: No  Vision loss: Yes  Dry eyes: Yes  Watery eyes: Yes  Eye bulging: No  Double vision: No  Flashing of lights: No  Spots: Yes  Floaters: Yes  Redness: Yes  Crossed eyes: No  Tunnel Vision: No  Yellowing of eyes: No  Eye irritation: Yes  Chest pain or pressure: No  Fast or irregular heartbeat: Yes  Pain in legs with walking: Yes  Trouble breathing while lying down: No  Fingers or toes appear blue: No  High blood pressure: No  Low blood pressure: No  Fainting: No  Murmurs: No  Pacemaker: No  Varicose veins: Yes  Edema or swelling: No  Wake up at night with shortness of breath: No  Light-headedness: No  Exercise intolerance: Yes  Heart burn or indigestion: No  Nausea: No  Vomiting: No  Abdominal pain: No  Bloating: No  Constipation: No  Diarrhea: Yes  Blood in stool: No  Black stools: No  Rectal or Anal pain: No  Fecal incontinence: No  Yellowing of skin or eyes: No  Vomit with blood: No  Change in stools: No  Back pain: Yes  Muscle aches: Yes  Neck pain: Yes  Swollen joints: Yes  Joint pain: Yes  Bone pain: Yes  Muscle cramps: Yes  Muscle weakness: Yes  Joint stiffness: Yes  Bone fracture: No  Anemia: No  Swollen glands: No  Easy bleeding or bruising: Yes  Trouble with coordination: Yes  Dizziness or trouble with balance: Yes  Fainting or black-out spells: No  Memory loss: Yes  Headache: No  Seizures: No  Speech problems: No  Tingling: Yes  Tremor: No  Weakness: Yes  Difficulty walking: Yes  Paralysis: No  Numbness: Yes    PMH:   Past Medical History:   Diagnosis Date     Arrhythmia       Cancer (H)     esophageal     Glaucoma      Hypertension      Paroxysmal A-fib (H)      Tubular adenoma 2017       PSxH:   Past Surgical History:   Procedure Laterality Date     AMPUTATION      toe     ARTHROSCOPY KNEE       bunion surgery       CHOLECYSTECTOMY       COLONOSCOPY  2017    remove 2 polyps     ESOPHAGOSCOPY, GASTROSCOPY, DUODENOSCOPY (EGD), COMBINED N/A 10/10/2018    Procedure: COMBINED ESOPHAGOSCOPY, GASTROSCOPY, DUODENOSCOPY (EGD);  Esophagogastroduodenoscopy ;  Surgeon: Leonel Joel MD;  Location: [facility] OR     INSERT PORT VASCULAR ACCESS Right 2017    Procedure: INSERT PORT VASCULAR ACCESS;  Single Lumen Chest Power Port;  Surgeon: Jasiel Hu PA-C;  Location: [facility]       Allergies: Penicillin g    SH:   Social History     Socioeconomic History     Marital status: Single     Spouse name: Not on file     Number of children: Not on file     Years of education: Not on file     Highest education level: Not on file   Occupational History     Not on file   Social Needs     Financial resource strain: Not on file     Food insecurity     Worry: Not on file     Inability: Not on file     Transportation needs     Medical: Not on file     Non-medical: Not on file   Tobacco Use     Smoking status: Former Smoker     Packs/day: 1.00     Years: 20.00     Pack years: 20.00     Types: Cigarettes     Quit date: 2006     Years since quittin.9     Smokeless tobacco: Never Used   Substance and Sexual Activity     Alcohol use: Yes     Comment: occasional     Drug use: No     Comment: h/o over 30 years ago     Sexual activity: Not on file   Lifestyle     Physical activity     Days per week: Not on file     Minutes per session: Not on file     Stress: Not on file   Relationships     Social connections     Talks on phone: Not on file     Gets together: Not on file     Attends [employer] service: Not on file     Active member of club or organization: Not on file     Attends  "meetings of clubs or organizations: Not on file     Relationship status: Not on file     Intimate partner violence     Fear of current or ex partner: Not on file     Emotionally abused: Not on file     Physically abused: Not on file     Forced sexual activity: Not on file   Other Topics Concern     Parent/sibling w/ CABG, MI or angioplasty before 65F 55M? Not Asked   Social History Narrative     Not on file       FH: No family history on file.    Objective:  Data Unavailable Data Unavailable Data Unavailable Data Unavailable 6' 6\" 355 lbs 0 oz    PT and DP pulses are 2/4 bilaterally. CRT is instant. Diminished pedal hair. Varicosities noted BL.   Gross sensation is diminished bilaterally.  Protective sensation is diminished as tested with a 5.07G monofilament.  Equinus is noted bilaterally. No pain with active or passive ROM of the ankle, MTJ, 1st ray, or halluces bilaterally,.  No pain noted across the foot.  Right hallux amputation noted.  With ambulation he does have slight forefoot varus at the mid stance.  Nails mycotic bilaterally. No open lesions are noted.     Assessment: Levi is a 60-year-old with chemotherapy-induced neuropathy and right hallux amputation.  He would like a pair of inserts molded for him today.  Onychomycosis  PVD    Plan:  - Pt seen and evaluated.  - Orthotics were molded and sent to the lab.  - Nails debrided x 10.   - See again PRN.        Arnold Forbes, JOSEM    "

## 2020-12-14 NOTE — PROGRESS NOTES
"Reuben Padilla is a 60 year old male who is being evaluated via a billable telephone visit.      The patient has been notified of following:     \"This telephone visit will be conducted via a call between you and your physician/provider. We have found that certain health care needs can be provided without the need for a physical exam.  This service lets us provide the care you need with a short phone conversation.  If a prescription is necessary we can send it directly to your pharmacy.  If lab work is needed we can place an order for that and you can then stop by our lab to have the test done at a later time.    Telephone visits are billed at different rates depending on your insurance coverage. During this emergency period, for some insurers they may be billed the same as an in-person visit.  Please reach out to your insurance provider with any questions.    If during the course of the call the physician/provider feels a telephone visit is not appropriate, you will not be charged for this service.\"    Patient has given verbal consent for Telephone visit?  Yes    What phone number would you like to be contacted at? [phone]    How would you like to obtain your AVS? MyChart    Patient needs refills for Gabapentin, Coumadin, and Ambien.    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 His tumor is positive for cytokeratin 20, negative for P63 and CK7, and HER2 is negative. He started on FOLFOX (5FU/oxaliplatin) on 5/15/2017. He had a delay in treatment from 9/5-10/2/17 due to work related injury.     He had an excellent response by imaging throughout the summer 2017.    He a mixed response by imaging in late fall 2017. "    In January 2018, he was switched to taxol and cyramza - had slight progression and neuropathy and clinical trial became available.        In March 2018, he was started on the [employer] Match Clinical Trial with crizotinib.  (MET amplification) He remained on crizotinib from March - July 2018.     August 2018, he was switched back to taxol/cramza.  In October 2018, he was switched to Keytruda (PD1 overexpressor).              -April 2019, his infusion was held for three weeks, and he was treated with prednisone and Enbrel for grade 3 arthralgias. Keytruda was resumed              -May 2020 he started Actemra (5/14).  This was initially WEEKLY and then in August- moved to QOW (secondary to fatigue and concerns for low WBC).                -August 2020 moved to q6 week Keytruda (still at the 200 mg dose)              -November 2020 moved back to q3w Keytruda     INTERVAL HISTORY:   Levi had been on Keytruda every six weeks now (this started 8/10/20) however his last CT showed some mild progression in the primary tumor.  Levi had been having some occ dysphagia- thus his Keytruda was moved back to every 3 weeks. This is occurring about a few times a week- he is avoiding thicker foods (sandwhiches, etc) but it can happen with liquids as well.  Washing it down with fluids usually helps, so he is tolerating it without concerns.      He tolerated this last 3 week interval well.  He doesn't feel his arthralgias/strength in his hands has worsened with going back to every 3 weeks.  He has been on Actemra weekly now, he is unsure how much this is helping.     His energy levels are stable, no major changes in his fatigue or stamina.  Cut back on his bike this last week, but doing PT  He tried the keto x 7 weeks and hardly lost weigh so was frustrated with this.  Hoping the increase in activity will help, weight is down a few pounds.  Otherwise no major f/s/c or infection concerns.  Saw the podiatrist today, hoping some orthotics  will help.      He has had no issues with fevers or chills, no chest pain or shortness of breath, no nausea, vomiting, diarrhea or constipation.  No rash       He has no abdminal pain.  No n/v/d/c.   Neuropathy is well controlled and in his feet, stable.  Occ has some balance issues.      He is on Coumadin now. His INR is being managed in [place].          ROS: 10 point ROS neg other than the symptoms noted above in the HPI.     PHYSICAL EXAMINATION  /88 (BP Location: Right arm, Patient Position: Sitting, Cuff Size: Adult Large)   Pulse 92   Temp 97.2  F (36.2  C) (Tympanic)   Resp 16   Wt (!) 162.4 kg (358 lb)   SpO2 97%   BMI 41.37 kg/m    Wt Readings from Last 4 Encounters:   12/14/20 (!) 162.4 kg (358 lb)   12/14/20 (!) 161 kg (355 lb)   11/23/20 (!) 162.1 kg (357 lb 4.8 oz)   11/02/20 (!) 163.7 kg (361 lb)       Voice is clear and strong. No audible stridor, wheezing, or respiratory distress. The remainder of PE was deferred due to PHE.          Labs:       11/23/2020 12:40 12/14/2020 10:46   Sodium 139 142   Potassium 4.0 3.9   Chloride 109 112 (H)   Carbon Dioxide 25 24   Urea Nitrogen 14 13   Creatinine 0.88 0.86   GFR Estimate >90 >90   GFR Estimate If Black >90 >90   Calcium 8.8 8.6   Anion Gap 5 7   Albumin 3.8 3.8   Protein Total 7.2 7.0   Bilirubin Total 1.0 1.1   Alkaline Phosphatase 75 76   ALT 34 32   AST 22 18   TSH 1.51 2.00   Glucose 109 (H) 102 (H)   WBC 3.1 (L) 3.0 (L)   Hemoglobin 16.5 16.0   Hematocrit 48.6 46.0   Platelet Count 132 (L) 109 (L)   RBC Count 5.42 5.25   MCV 90 88   MCH 30.4 30.5   MCHC 34.0 34.8   RDW 12.2 12.2   Diff Method Automated Method Automated Method   % Neutrophils 56.7 50.0   % Lymphocytes 33.0 39.3   % Monocytes 8.7 8.7   % Eosinophils 1.0 1.0   % Basophils 0.3 1.0   % Immature Granulocytes 0.3 0.0   Nucleated RBCs 0 0   Absolute Neutrophil 1.8 1.5 (L)   Absolute Lymphocytes 1.0 1.2   Absolute Monocytes 0.3 0.3   Absolute Eosinophils 0.0 0.0   Absolute  Basophils 0.0 0.0   Abs Immature Granulocytes 0.0 0.0   Absolute Nucleated RBC 0.0 0.0   INR 2.92 (H) 2.59 (H)          IMPRESSION/PLAN:  1. Metastatic esophogeal adenocarcinoma. He has stable disease on Keytruda, for TWO years now, which is great news.  Given ongoing arthralgias and fatigue and stable disease, his Keytruda was dropped back to every 6 weeks, still at the 200 mg dose.  He saw Dr Dee in early November and his CT was mildly progressed.  His dose was changed back to 200 mg every 3 weeks (verses 6 weeks). He is clinically stable- having some dysphagia intermittently, but this is not increasing in frequency or intensity. He discussed moving up his imaging, but we discussed closely monitoring his symptoms and moving up imaging if we are concerned.   -OK for C32     2. Immune related arthralgias- still thenar atrophy in his hands, but good strength.  No actual pain, just stiffness.  He tapered off the prednisone in October and recently moved to weekly Actemra.  Stable as of today.  No worsening with increasing the Keytruda back to q3w doses.       3. H/o PE 2018.   He has progressed through Xarelto in the past (didn't take it regularly).  He was then on lovenox but his insurance changed and this became unaffordable. He is now on Coumadin and labs drawn  at [facility] in [place] fax ([phone]).  He is monitored by our coumadin clinic.     4. Baseline neuropathy and got worse with chemo and is on gabapentin BID, its primarily sensory in his feet only.  Has some balance issues on occasion, likely 2/2 to his weakness.  Just saw podiatry and has orthotics made.      5. Fatigue- TSH, Cortisol normal. Has been relatively stable, is going to have an active weekend helping friends with a project, so he will push his stamina this weekend.  Is still doing PT 2 x week.              Phone call duration: 20 minutes    Nicole Sutherland PA-C

## 2020-12-21 ENCOUNTER — TELEPHONE (OUTPATIENT)
Dept: ONCOLOGY | Facility: CLINIC | Age: 60
End: 2020-12-21

## 2020-12-21 NOTE — TELEPHONE ENCOUNTER
Writer placed call to [facility]ag[facility] when eating. Levi reports that it was an item he previously had not had trouble swallowing and that it took 2 hours to be able to finally get it down. He says this is the worst it's ever been and that he feels that he has had continued growth. Writer updated Nicole Sutherland with information and will have [facility] [place] call him if additional imaging appropriate as this possibility was previously discussed with Levi.

## 2020-12-30 ENCOUNTER — ANCILLARY PROCEDURE (OUTPATIENT)
Dept: CT IMAGING | Facility: CLINIC | Age: 60
End: 2020-12-30
Attending: INTERNAL MEDICINE
Payer: MEDICARE

## 2020-12-30 DIAGNOSIS — C16.9 METASTASIS FROM GASTRIC CANCER (H): ICD-10-CM

## 2020-12-30 DIAGNOSIS — C79.9 METASTASIS FROM GASTRIC CANCER (H): ICD-10-CM

## 2020-12-30 LAB — RADIOLOGIST FLAGS: ABNORMAL

## 2020-12-30 PROCEDURE — 74177 CT ABD & PELVIS W/CONTRAST: CPT | Mod: GC | Performed by: RADIOLOGY

## 2020-12-30 PROCEDURE — 71260 CT THORAX DX C+: CPT | Mod: GC | Performed by: RADIOLOGY

## 2020-12-30 RX ORDER — IOPAMIDOL 755 MG/ML
135 INJECTION, SOLUTION INTRAVASCULAR ONCE
Status: COMPLETED | OUTPATIENT
Start: 2020-12-30 | End: 2020-12-30

## 2020-12-30 RX ORDER — HEPARIN SODIUM (PORCINE) LOCK FLUSH IV SOLN 100 UNIT/ML 100 UNIT/ML
5 SOLUTION INTRAVENOUS ONCE
Status: COMPLETED | OUTPATIENT
Start: 2020-12-30 | End: 2020-12-30

## 2020-12-30 RX ADMIN — IOPAMIDOL 135 ML: 755 INJECTION, SOLUTION INTRAVASCULAR at 08:13

## 2020-12-30 RX ADMIN — HEPARIN SODIUM (PORCINE) LOCK FLUSH IV SOLN 100 UNIT/ML 5 ML: 100 SOLUTION at 08:19

## 2020-12-31 ENCOUNTER — MYC MEDICAL ADVICE (OUTPATIENT)
Dept: ONCOLOGY | Facility: CLINIC | Age: 60
End: 2020-12-31

## 2020-12-31 ENCOUNTER — TELEPHONE (OUTPATIENT)
Dept: ONCOLOGY | Facility: CLINIC | Age: 60
End: 2020-12-31

## 2020-12-31 DIAGNOSIS — K37 APPENDICITIS, UNSPECIFIED APPENDICITIS TYPE: ICD-10-CM

## 2020-12-31 DIAGNOSIS — C78.7 MALIGNANT NEOPLASM METASTATIC TO LIVER (H): ICD-10-CM

## 2020-12-31 DIAGNOSIS — C15.5 CANCER OF DISTAL THIRD OF ESOPHAGUS (H): Primary | ICD-10-CM

## 2020-12-31 NOTE — TELEPHONE ENCOUNTER
I called Levi.  He has occasional abd pain.  No fevers. The findings of appendicitis have been seen on prior scan.  We discussed if he has an increase in abdominal pain, fevers, he should present to the ER.      We reviewed he has slight progression of his cancer.  I would like to screen him for the QUILT trial.  I have been in contact with Dr Dawkins, study PI.  They will discuss the study with him next week.    Other options would be chemoradiation.    Kadi Dee

## 2020-12-31 NOTE — TELEPHONE ENCOUNTER
Spoke with patient, he reports developing abdominal pain yesterday, which he states it minimal currently but earlier this AM was 4-5/10. He has not consumed any food today and had minimal fluid intake. He feels bloated and nauseous. He states he always has chills. Experienced diarrhea this morning and last night.     He denies fever, vomiting, dizziness.     Spoke with Dr. Dee, she will contact patient.

## 2021-01-04 ENCOUNTER — VIRTUAL VISIT (OUTPATIENT)
Dept: ONCOLOGY | Facility: CLINIC | Age: 61
End: 2021-01-04
Attending: PHYSICIAN ASSISTANT
Payer: MEDICARE

## 2021-01-04 ENCOUNTER — ANTICOAGULATION THERAPY VISIT (OUTPATIENT)
Dept: ANTICOAGULATION | Facility: CLINIC | Age: 61
End: 2021-01-04

## 2021-01-04 ENCOUNTER — TELEPHONE (OUTPATIENT)
Dept: ORTHOPEDICS | Facility: CLINIC | Age: 61
End: 2021-01-04

## 2021-01-04 ENCOUNTER — APPOINTMENT (OUTPATIENT)
Dept: LAB | Facility: CLINIC | Age: 61
End: 2021-01-04
Attending: INTERNAL MEDICINE
Payer: MEDICARE

## 2021-01-04 VITALS
HEART RATE: 79 BPM | BODY MASS INDEX: 41.76 KG/M2 | RESPIRATION RATE: 18 BRPM | DIASTOLIC BLOOD PRESSURE: 90 MMHG | SYSTOLIC BLOOD PRESSURE: 135 MMHG | OXYGEN SATURATION: 97 % | WEIGHT: 315 LBS

## 2021-01-04 VITALS — TEMPERATURE: 98.6 F

## 2021-01-04 DIAGNOSIS — I26.99 OTHER ACUTE PULMONARY EMBOLISM WITHOUT ACUTE COR PULMONALE (H): ICD-10-CM

## 2021-01-04 DIAGNOSIS — C15.5 CANCER OF DISTAL THIRD OF ESOPHAGUS (H): Primary | ICD-10-CM

## 2021-01-04 DIAGNOSIS — I26.99 ACUTE PULMONARY EMBOLISM (H): ICD-10-CM

## 2021-01-04 DIAGNOSIS — D70.1 CHEMOTHERAPY-INDUCED NEUTROPENIA (H): ICD-10-CM

## 2021-01-04 DIAGNOSIS — Z86.711 HX OF PULMONARY EMBOLUS: ICD-10-CM

## 2021-01-04 DIAGNOSIS — T45.1X5A CHEMOTHERAPY-INDUCED NEUTROPENIA (H): ICD-10-CM

## 2021-01-04 LAB
ALBUMIN SERPL-MCNC: 3.6 G/DL (ref 3.4–5)
ALP SERPL-CCNC: 69 U/L (ref 40–150)
ALT SERPL W P-5'-P-CCNC: 32 U/L (ref 0–70)
ANION GAP SERPL CALCULATED.3IONS-SCNC: 6 MMOL/L (ref 3–14)
AST SERPL W P-5'-P-CCNC: 19 U/L (ref 0–45)
BASOPHILS # BLD AUTO: 0 10E9/L (ref 0–0.2)
BASOPHILS NFR BLD AUTO: 0.4 %
BILIRUB SERPL-MCNC: 0.6 MG/DL (ref 0.2–1.3)
BUN SERPL-MCNC: 14 MG/DL (ref 7–30)
CALCIUM SERPL-MCNC: 8.6 MG/DL (ref 8.5–10.1)
CHLORIDE SERPL-SCNC: 112 MMOL/L (ref 94–109)
CO2 SERPL-SCNC: 26 MMOL/L (ref 20–32)
CREAT SERPL-MCNC: 0.84 MG/DL (ref 0.66–1.25)
DIFFERENTIAL METHOD BLD: ABNORMAL
EOSINOPHIL # BLD AUTO: 0 10E9/L (ref 0–0.7)
EOSINOPHIL NFR BLD AUTO: 1.1 %
ERYTHROCYTE [DISTWIDTH] IN BLOOD BY AUTOMATED COUNT: 12.4 % (ref 10–15)
GFR SERPL CREATININE-BSD FRML MDRD: >90 ML/MIN/{1.73_M2}
GLUCOSE SERPL-MCNC: 109 MG/DL (ref 70–99)
HCT VFR BLD AUTO: 45.2 % (ref 40–53)
HGB BLD-MCNC: 15.3 G/DL (ref 13.3–17.7)
IMM GRANULOCYTES # BLD: 0 10E9/L (ref 0–0.4)
IMM GRANULOCYTES NFR BLD: 0 %
INR PPP: 3.25 (ref 0.86–1.14)
LYMPHOCYTES # BLD AUTO: 1 10E9/L (ref 0.8–5.3)
LYMPHOCYTES NFR BLD AUTO: 38 %
MCH RBC QN AUTO: 30.1 PG (ref 26.5–33)
MCHC RBC AUTO-ENTMCNC: 33.8 G/DL (ref 31.5–36.5)
MCV RBC AUTO: 89 FL (ref 78–100)
MONOCYTES # BLD AUTO: 0.2 10E9/L (ref 0–1.3)
MONOCYTES NFR BLD AUTO: 9 %
NEUTROPHILS # BLD AUTO: 1.4 10E9/L (ref 1.6–8.3)
NEUTROPHILS NFR BLD AUTO: 51.5 %
NRBC # BLD AUTO: 0 10*3/UL
NRBC BLD AUTO-RTO: 0 /100
PLATELET # BLD AUTO: 108 10E9/L (ref 150–450)
POTASSIUM SERPL-SCNC: 3.9 MMOL/L (ref 3.4–5.3)
PROT SERPL-MCNC: 6.8 G/DL (ref 6.8–8.8)
RBC # BLD AUTO: 5.09 10E12/L (ref 4.4–5.9)
SODIUM SERPL-SCNC: 144 MMOL/L (ref 133–144)
TSH SERPL DL<=0.005 MIU/L-ACNC: 3.37 MU/L (ref 0.4–4)
WBC # BLD AUTO: 2.7 10E9/L (ref 4–11)

## 2021-01-04 PROCEDURE — 80053 COMPREHEN METABOLIC PANEL: CPT | Performed by: PHYSICIAN ASSISTANT

## 2021-01-04 PROCEDURE — 99215 OFFICE O/P EST HI 40 MIN: CPT | Performed by: PHYSICIAN ASSISTANT

## 2021-01-04 PROCEDURE — 85610 PROTHROMBIN TIME: CPT | Performed by: INTERNAL MEDICINE

## 2021-01-04 PROCEDURE — 85025 COMPLETE CBC W/AUTO DIFF WBC: CPT | Performed by: PHYSICIAN ASSISTANT

## 2021-01-04 PROCEDURE — 258N000003 HC RX IP 258 OP 636: Performed by: PHYSICIAN ASSISTANT

## 2021-01-04 PROCEDURE — 99417 PROLNG OP E/M EACH 15 MIN: CPT | Performed by: PHYSICIAN ASSISTANT

## 2021-01-04 PROCEDURE — 250N000011 HC RX IP 250 OP 636: Performed by: PHYSICIAN ASSISTANT

## 2021-01-04 PROCEDURE — 84443 ASSAY THYROID STIM HORMONE: CPT | Performed by: PHYSICIAN ASSISTANT

## 2021-01-04 PROCEDURE — 99207 PR NO BILLABLE SERVICE THIS VISIT: CPT

## 2021-01-04 PROCEDURE — 96413 CHEMO IV INFUSION 1 HR: CPT

## 2021-01-04 RX ORDER — HEPARIN SODIUM (PORCINE) LOCK FLUSH IV SOLN 100 UNIT/ML 100 UNIT/ML
500 SOLUTION INTRAVENOUS ONCE
Status: COMPLETED | OUTPATIENT
Start: 2021-01-04 | End: 2021-01-04

## 2021-01-04 RX ORDER — LORAZEPAM 2 MG/ML
0.5 INJECTION INTRAMUSCULAR EVERY 4 HOURS PRN
Status: CANCELLED
Start: 2021-01-04

## 2021-01-04 RX ORDER — ALBUTEROL SULFATE 0.83 MG/ML
2.5 SOLUTION RESPIRATORY (INHALATION)
Status: CANCELLED | OUTPATIENT
Start: 2021-01-04

## 2021-01-04 RX ORDER — EPINEPHRINE 0.3 MG/.3ML
0.3 INJECTION SUBCUTANEOUS EVERY 5 MIN PRN
Status: CANCELLED | OUTPATIENT
Start: 2021-01-04

## 2021-01-04 RX ORDER — ALBUTEROL SULFATE 90 UG/1
1-2 AEROSOL, METERED RESPIRATORY (INHALATION)
Status: CANCELLED
Start: 2021-01-04

## 2021-01-04 RX ORDER — EPINEPHRINE 1 MG/ML
0.3 INJECTION, SOLUTION INTRAMUSCULAR; SUBCUTANEOUS EVERY 5 MIN PRN
Status: CANCELLED | OUTPATIENT
Start: 2021-01-04

## 2021-01-04 RX ORDER — DIPHENHYDRAMINE HYDROCHLORIDE 50 MG/ML
50 INJECTION INTRAMUSCULAR; INTRAVENOUS
Status: CANCELLED
Start: 2021-01-04

## 2021-01-04 RX ORDER — HEPARIN SODIUM (PORCINE) LOCK FLUSH IV SOLN 100 UNIT/ML 100 UNIT/ML
500 SOLUTION INTRAVENOUS ONCE
Status: CANCELLED | OUTPATIENT
Start: 2021-01-04 | End: 2021-01-07

## 2021-01-04 RX ORDER — METHYLPREDNISOLONE SODIUM SUCCINATE 125 MG/2ML
125 INJECTION, POWDER, LYOPHILIZED, FOR SOLUTION INTRAMUSCULAR; INTRAVENOUS
Status: CANCELLED
Start: 2021-01-04

## 2021-01-04 RX ORDER — MEPERIDINE HYDROCHLORIDE 25 MG/ML
25 INJECTION INTRAMUSCULAR; INTRAVENOUS; SUBCUTANEOUS EVERY 30 MIN PRN
Status: CANCELLED | OUTPATIENT
Start: 2021-01-04

## 2021-01-04 RX ORDER — SODIUM CHLORIDE 9 MG/ML
1000 INJECTION, SOLUTION INTRAVENOUS CONTINUOUS PRN
Status: CANCELLED
Start: 2021-01-04

## 2021-01-04 RX ADMIN — SODIUM CHLORIDE 200 MG: 9 INJECTION, SOLUTION INTRAVENOUS at 12:02

## 2021-01-04 RX ADMIN — Medication 500 UNITS: at 12:38

## 2021-01-04 RX ADMIN — SODIUM CHLORIDE, PRESERVATIVE FREE 500 UNITS: 5 INJECTION INTRAVENOUS at 09:51

## 2021-01-04 RX ADMIN — SODIUM CHLORIDE 250 ML: 9 INJECTION, SOLUTION INTRAVENOUS at 12:00

## 2021-01-04 ASSESSMENT — PAIN SCALES - GENERAL: PAINLEVEL: NO PAIN (0)

## 2021-01-04 NOTE — PROGRESS NOTES
ANTICOAGULATION FOLLOW-UP CLINIC VISIT    Patient Name:  Reuben Padilla  Date:  1/4/2021  Contact Type:  Telephone    SUBJECTIVE:  Patient Findings     Positives:  Change in medications (1/2-started amoxicillin for appendicitis)             OBJECTIVE    Recent labs: (last 7 days)     01/04/21  0940   INR 3.25*       ASSESSMENT / PLAN  INR assessment SUPRA    Recheck INR In: 6 WEEKS    INR Location Clinic      Anticoagulation Summary  As of 1/4/2021    INR goal:  2.0-3.0   TTR:  68.4 % (9.7 mo)   INR used for dosing:  3.25 (1/4/2021)   Warfarin maintenance plan:  5 mg (5 mg x 1) every day   Full warfarin instructions:  1/4: 2.5 mg; Otherwise 5 mg every day   Weekly warfarin total:  35 mg   Plan last modified:  Delfina Thomas RN (6/29/2020)   Next INR check:  2/15/2021   Priority:  Maintenance   Target end date:  Indefinite    Indications    Acute pulmonary embolism (H) [I26.99]  Hx of pulmonary embolus [Z86.711]  Other acute pulmonary embolism without acute cor pulmonale (H) [I26.99]             Anticoagulation Episode Summary     INR check location:      Preferred lab:      Send INR reminders to:  [facility]    Comments:  [facility] (P)[phone] (F)[phone], Takes Warfarin in the am      Anticoagulation Care Providers     Provider Role Specialty Phone number    Kadi Dee MD Referring Hematology & Oncology [phone]            See the Encounter Report to view Anticoagulation Flowsheet and Dosing Calendar (Go to Encounters tab in chart review, and find the Anticoagulation Therapy Visit)    Spoke with patient.    Alley Alarcon RN

## 2021-01-04 NOTE — TELEPHONE ENCOUNTER
Called and spoke to lisa. Pt was upset that he wasn't informed that we don't make the orthotics. Pt was under the understanding that the orthotics were already made and that he had to come pick them up. Provided [facility] number for the [facility] and encouraged him to call to get them made. All questions answered.     Nati Schuster ATC

## 2021-01-04 NOTE — NURSING NOTE
"Oncology Rooming Note    January 4, 2021 10:11 AM   Reuben Padilla is a 60 year old male who presents for:    Chief Complaint   Patient presents with     Video Visit     cancer of distal third of esophagus     Port Draw     labs drawn via port by RN in lab      Initial Vitals: BP (!) 135/90   Pulse 79   Resp 18   Wt (!) 163.9 kg (361 lb 6.4 oz)   SpO2 97%   BMI 41.76 kg/m   Estimated body mass index is 41.76 kg/m  as calculated from the following:    Height as of 12/14/20: 1.981 m (6' 6\").    Weight as of this encounter: 163.9 kg (361 lb 6.4 oz). Body surface area is 3 meters squared.  Mild Pain (2) Comment: Data Unavailable   No LMP for male patient.  Allergies reviewed: Yes  Medications reviewed: Yes    Medications: Medication refills not needed today.  Pharmacy name entered into EPIC:    [id number] - [place], [place] - [address] [facility] [address] - [place], [place] - [address] [facility] [address] [facility] - [place] [address] [facility]  [facility] - [place], [place] - [address]    Clinical concerns: No new concerns. Ena was notified.      Haider Meraz LPN            "

## 2021-01-04 NOTE — TELEPHONE ENCOUNTER
[facility] Call Center    Phone Message    May a detailed message be left on voicemail: yes     Reason for Call: Other: Venita is calling to see when his orthotics/inserts will be ready for ?      He is in the building right now if they're ready.  If not please call patient back.    Action Taken: Message routed to:  [facility] ([facility] ortho    Travel Screening: Not Applicable

## 2021-01-04 NOTE — LETTER
1/4/2021         RE: Reuben Padilla  [address] [place] [id number]        Dear Colleague,    Thank you for referring your patient, Reuben Padilla, to the [facility]. Please see a copy of my visit note below.        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 His tumor is positive for cytokeratin 20, negative for P63 and CK7, and HER2 is negative. He started on FOLFOX (5FU/oxaliplatin) on 5/15/2017. He had a delay in treatment from 9/5-10/2/17 due to work related injury.     He had an excellent response by imaging throughout the summer 2017.    He a mixed response by imaging in late fall 2017.    In January 2018, he was switched to taxol and cyramza - had slight progression and neuropathy and clinical trial became available.        In March 2018, he was started on the [facility] Match Clinical Trial with crizotinib.  (MET amplification) He remained on crizotinib from March - July 2018.     August 2018, he was switched back to taxol/cramza.  In October 2018, he was switched to Keytruda (PD1 overexpressor).              -April 2019, his infusion was held for three weeks, and he was treated with prednisone and Enbrel for grade 3 arthralgias. Keytruda was resumed              -May 2020 he started Actemra (5/14).  This was initially WEEKLY and then in August- moved to QOW (secondary to fatigue and concerns for low WBC).                -August 2020 moved to q6 week Keytruda (still at the 200 mg dose)              -November 2020 moved back to q3w Keytruda  CT 12/30/2020: progression of esophageal mass     INTERVAL HISTORY:   Levi had been on Keytruda every six weeks now (this started 8/10/20) however his last CT  showed some mild progression in the primary tumor.  Levi had been having some occ dysphagia- thus his Keytruda was moved back to every 3 weeks. This is occurring about a few times a week- he is avoiding thicker foods (sandwhiches, etc) but it can happen with liquids as well.  Washing it down with fluids usually helps, so he is tolerating it without concerns.  He's had a few bad episodes of food stick over the last few weeks, thus has moved to a liquid diet.     He tolerated this last 3 week interval well.  He doesn't feel his arthralgias/strength in his hands has worsened with going back to every 3 weeks.  He has been on Actemra weekly now, he is unsure how much this is helping.     He was started on Augmentin given some concerning findings on his CT, he tolerated this well.  He did not have abdominal pain, but did have some nausea x 2 days that resolved when starting the antibiotic.  He is unsure if it correlated.     He also had one episode of vertigo when sitting up in his chair- he had profound room spinning and nausea.  He went to bed, the next day felt fine.  But again when sitting up in his lift chair the next night- a milder vertigo occurred again.      He has had no issues with fevers or chills, no chest pain or shortness of breath, no nausea, vomiting, diarrhea or constipation.  No rash       Neuropathy is well controlled and in his feet, stable.  Occ has some balance issues.      He is on Coumadin now. His INR is being managed in [place].          ROS: 10 point ROS neg other than the symptoms noted above in the HPI.     PHYSICAL EXAMINATION  BP (!) 135/90   Pulse 79   Resp 18   Wt (!) 163.9 kg (361 lb 6.4 oz)   SpO2 97%   BMI 41.76 kg/m    Wt Readings from Last 4 Encounters:   01/04/21 (!) 163.9 kg (361 lb 6.4 oz)   12/14/20 (!) 162.4 kg (358 lb)   12/14/20 (!) 161 kg (355 lb)   11/23/20 (!) 162.1 kg (357 lb 4.8 oz)       Vital signs were reviewed.   Patient alert and oriented x3.   PERRLA. EOMI.  No scleral icterus noted. OP without thrush/sores.  Neck exam: No palpable cervical, supraclavicular or axillary nodes bilaterally.   Heart: RRR no murmurs noted.   Lungs: clear to auscultation bilaterally.  No crackles or wheezing.   Abd: positive bowel sounds in all four quadrants. Obese.  No tenderness to palpation.  No hepatomegaly.   Extremities: No lower extremity edema but dark stasis dermatits  Neuro: grossly intact.   Mood and affect is stable.         Labs:      12/14/2020 10:46 1/4/2021 09:40   Sodium 142 144   Potassium 3.9 3.9   Chloride 112 (H) 112 (H)   Carbon Dioxide 24 26   Urea Nitrogen 13 14   Creatinine 0.86 0.84   GFR Estimate >90 >90   GFR Estimate If Black >90 >90   Calcium 8.6 8.6   Anion Gap 7 6   Albumin 3.8 3.6   Protein Total 7.0 6.8   Bilirubin Total 1.1 0.6   Alkaline Phosphatase 76 69   ALT 32 32   AST 18 19   TSH 2.00 3.37   Glucose 102 (H) 109 (H)   WBC 3.0 (L) 2.7 (L)   Hemoglobin 16.0 15.3   Hematocrit 46.0 45.2   Platelet Count 109 (L) 108 (L)   RBC Count 5.25 5.09   MCV 88 89   MCH 30.5 30.1   MCHC 34.8 33.8   RDW 12.2 12.4   Diff Method Automated Method Automated Method   % Neutrophils 50.0 51.5   % Lymphocytes 39.3 38.0   % Monocytes 8.7 9.0   % Eosinophils 1.0 1.1   % Basophils 1.0 0.4   % Immature Granulocytes 0.0 0.0   Nucleated RBCs 0 0   Absolute Neutrophil 1.5 (L) 1.4 (L)     IMPRESSION:  In this patient with history of esophageal cancer on Keytruda:   1. Continued mild increase in size of the infiltrative mass inferior  to the gastroesophageal junction, with encasement of the celiac axis  and right diaphragmatic andrey. No evidence for new metastatic disease  within the chest, abdomen or pelvis.  2. Findings concerning for acute uncomplicated appendicitis. Recommend  clinical correlation.  3. Stable sub-6 mm solid pulmonary nodules.  4. Additional incidental findings are stable from prior, including  splenomegaly and bilateral adrenal myelolipomas.     IMPRESSION/PLAN:  1.  Metastatic esophogeal adenocarcinoma. He has stable disease on Keytruda, for TWO years now, which is great news.  Given ongoing arthralgias and fatigue and stable disease, his Keytruda was dropped back to every 6 weeks, still at the 200 mg dose.  He saw Dr Dee in early November and his CT was mildly progressed at his GE junction tumor.  His dose was changed back to 200 mg every 3 weeks (verses 6 weeks). Levi noted worsening dysphagia, thus CT CAP was done on 12/30- showing ongoing primary tumor worsening with no evidence of metastatic disease.     Levi and I discussed 2 options:   1. QUILT trial- using  + immunotherapy  2. Concurrent chemoradiation to the GE junction tumor    We discussed pro/cons, logistics of both and toxicities.  I will contact him in the next few days with more final plans.     2. Immune related arthralgias- still thenar atrophy in his hands, but good strength.  No actual pain, just stiffness.  He tapered off the prednisone in October and recently moved to weekly Actemra.  Stable as of today.  No worsening with increasing the Keytruda back to q3w doses.       3. H/o PE 2018.   He has progressed through Xarelto in the past (didn't take it regularly).  He was then on lovenox but his insurance changed and this became unaffordable. He is now on Coumadin and labs drawn  at [facility] in [place] fax ([phone]).  He is monitored by our coumadin clinic.     4. Baseline neuropathy and got worse with chemo and is on gabapentin BID, its primarily sensory in his feet only.  Has some balance issues on occasion, likely 2/2 to his weakness.  Just saw podiatry and has orthotics made.      5. Fatigue- TSH, Cortisol normal. Has been relatively stable, is going to have an active weekend helping friends with a project, so he will push his stamina this weekend.  Is still doing PT 2 x week.     6. Possible appendicitis? I called radiology to review CT scans- there was  "\"appendicitis\" noted in spring of 2019 on multiple CT scans without any progression or symptoms noted by patient.  Radiology felt that there was thickened wall of the appendix and some stranding throughout 3-6 months on imaging.  The CT signs then resolved through most of 2020 and appears back. Given the waxing/waning they did not feel it was cancer? But they were uncertain if it could represent a true infection.  Given lack of exam findings they felt infection risk was low, but we need to continue to monitor.    7. Episode of tinnitus and vertigo- Levi had an intense episode of vertigo last week when moving positions in his recliner, associated with diaphoresis and nausea.  Symptoms resolved over night, but then the next evening with the same position change in his chair, he had another episode.  Discussed positional vertigo verses Meniere's - monitor for now and consider ENT.       AMEND: after further discussion with kristen and other MD's regarding eligibility for trial- decision was to obtain PET and pursue chemoradiation.  Dr Costa requested PET to determine presence of metastatic disease or not- as this would help determine length of radiation time.  I called Levi, discussed with him and his brother.  We'll connect again after the PET with the plan.  He requests to follow up with Dr Dee as well. LGR      Over  60 min of direct face to face time spent with patient with more than 50% time spent in counseling and coordinating care.  An additional 15 min spent discussing with other MDs plan for treatment.     Again, thank you for allowing me to participate in the care of your patient.        Sincerely,      Loraine Sutherland PA-C  "

## 2021-01-04 NOTE — PROGRESS NOTES
Infusion Nursing Note:  Reuben Padilla presents today for C33D1 Keytruda.    Patient seen by provider today: Yes: Loraine HILL   present during visit today: Not Applicable.    Note: No complaints made. Otherwise well.    Intravenous Access:  Implanted Port.    Treatment Conditions:  Lab Results   Component Value Date    HGB 15.3 01/04/2021     Lab Results   Component Value Date    WBC 2.7 01/04/2021      Lab Results   Component Value Date    ANEU 1.4 01/04/2021     Lab Results   Component Value Date     01/04/2021      Lab Results   Component Value Date     01/04/2021                   Lab Results   Component Value Date    POTASSIUM 3.9 01/04/2021           Lab Results   Component Value Date    MAG 1.9 11/07/2018            Lab Results   Component Value Date    CR 0.84 01/04/2021                   Lab Results   Component Value Date    NIDHI 8.6 01/04/2021                Lab Results   Component Value Date    BILITOTAL 0.6 01/04/2021           Lab Results   Component Value Date    ALBUMIN 3.6 01/04/2021                    Lab Results   Component Value Date    ALT 32 01/04/2021           Lab Results   Component Value Date    AST 19 01/04/2021       Results reviewed, labs MET treatment parameters, ok to proceed with treatment.      Post Infusion Assessment:  Patient tolerated infusion without incident.  Blood return noted pre and post infusion.  Site patent and intact, free from redness, edema or discomfort.  No evidence of extravasations.  Access discontinued per protocol.       Discharge Plan:   Patient declined prescription refills.  Discharge instructions reviewed with: Patient.  Patient and/or family verbalized understanding of discharge instructions and all questions answered.  AVS to patient via viblastT.  Patient will return 1/25/21 for next appointment.   Patient discharged in stable condition accompanied by: self.  Departure Mode: Ambulatory.    KAMI YOUNG  RN

## 2021-01-04 NOTE — NURSING NOTE
Chief Complaint   Patient presents with     Video Visit     cancer of distal third of esophagus     Port Draw     labs drawn via port by RN in lab      BP (!) 135/90   Pulse 79   Resp 18   Wt (!) 163.9 kg (361 lb 6.4 oz)   SpO2 97%   BMI 41.76 kg/m      Port accessed by RN. Labs drawn and sent. Line flushed with NS and Heparin. Pt tolerated well.    Stephanie Dukes RN on 1/4/2021 at 9:49 AM

## 2021-01-05 ENCOUNTER — TELEPHONE (OUTPATIENT)
Dept: ORTHOPEDICS | Facility: CLINIC | Age: 61
End: 2021-01-05

## 2021-01-05 NOTE — TELEPHONE ENCOUNTER
[facility] Call Center    Phone Message    May a detailed message be left on voicemail: yes     Reason for Call: Other: Dr Forbes's notes state that an impression box was done on 12/14/20, for patient's inserts, though they have not received that. Can that be located or does this need to be redone?     Action Taken: Message routed to:  [facility] ([facility]): podiatry    Travel Screening: Not Applicable

## 2021-01-05 NOTE — TELEPHONE ENCOUNTER
Called [facility] back and informed them taht we do have the impressions here in clinic, they just need to pick them up. Janet stated she will have main lab come and get them.

## 2021-01-14 ENCOUNTER — HOSPITAL ENCOUNTER (OUTPATIENT)
Dept: PET IMAGING | Facility: CLINIC | Age: 61
Setting detail: NUCLEAR MEDICINE
Discharge: HOME | End: 2021-01-14
Attending: PHYSICIAN ASSISTANT | Admitting: PHYSICIAN ASSISTANT
Payer: MEDICARE

## 2021-01-14 DIAGNOSIS — C15.5 CANCER OF DISTAL THIRD OF ESOPHAGUS (H): ICD-10-CM

## 2021-01-14 PROCEDURE — 250N000011 HC RX IP 250 OP 636: Performed by: PHYSICIAN ASSISTANT

## 2021-01-14 PROCEDURE — 78816 PET IMAGE W/CT FULL BODY: CPT | Mod: 26 | Performed by: RADIOLOGY

## 2021-01-14 PROCEDURE — 78816 PET IMAGE W/CT FULL BODY: CPT | Mod: PS,MG

## 2021-01-14 PROCEDURE — A9552 F18 FDG: HCPCS | Performed by: PHYSICIAN ASSISTANT

## 2021-01-14 PROCEDURE — G1004 CDSM NDSC: HCPCS | Mod: GC | Performed by: RADIOLOGY

## 2021-01-14 PROCEDURE — 343N000001 HC RX 343: Performed by: PHYSICIAN ASSISTANT

## 2021-01-14 RX ORDER — IOPAMIDOL 755 MG/ML
45-150 INJECTION, SOLUTION INTRAVASCULAR ONCE
Status: COMPLETED | OUTPATIENT
Start: 2021-01-14 | End: 2021-01-14

## 2021-01-14 RX ORDER — HEPARIN SODIUM (PORCINE) LOCK FLUSH IV SOLN 100 UNIT/ML 100 UNIT/ML
500 SOLUTION INTRAVENOUS ONCE
Status: COMPLETED | OUTPATIENT
Start: 2021-01-14 | End: 2021-01-14

## 2021-01-14 RX ADMIN — Medication 500 UNITS: at 09:54

## 2021-01-14 RX ADMIN — IOPAMIDOL 135 ML: 755 INJECTION, SOLUTION INTRAVENOUS at 09:53

## 2021-01-14 RX ADMIN — FLUDEOXYGLUCOSE F-18 17.91 MCI.: 500 INJECTION, SOLUTION INTRAVENOUS at 09:00

## 2021-01-15 ENCOUNTER — TELEPHONE (OUTPATIENT)
Dept: ONCOLOGY | Facility: CLINIC | Age: 61
End: 2021-01-15

## 2021-01-15 NOTE — TELEPHONE ENCOUNTER
Author received call from patient's sister Natail, requesting clinic follow-up via phone call to discuss results of recent PET scan on 1/14/21.

## 2021-01-18 ENCOUNTER — PATIENT OUTREACH (OUTPATIENT)
Dept: ONCOLOGY | Facility: CLINIC | Age: 61
End: 2021-01-18

## 2021-01-18 DIAGNOSIS — R42 VERTIGO: Primary | ICD-10-CM

## 2021-01-18 DIAGNOSIS — H93.13 TINNITUS, BILATERAL: ICD-10-CM

## 2021-01-18 NOTE — PROGRESS NOTES
Levi calls to request a refill of his augmentin as he remembers it helping with his vertigo symptoms. He describes a ringing in the ear for a few days, and then he feels like it plugs up and he gets sick and dizzy. On Saturday night from 1030pm to 330am he was very nauseated as he was experiencing one of the times where his ear is plugged up. We spoke about per chart review I see that augmentin was prescribed for a different issue, but that I would talk to his team and get back to him.    SERGIO Barker, did confirm that the augmentin was used for a different reason, but it happened to be used around the same time that these issues popped up for Levi. She recommends physical therapy and a ENT referral. Levi verbalized understanding. Note to ENT pool for urgent scheduling.    Wendy Chang RN

## 2021-01-18 NOTE — PROGRESS NOTES
RADIATION ONCOLOGY CONSULT NOTE  Date of Visit: 2021    Reuben Padilla  MRN: [id number]  : 1960    Reuben Padilla is being seen today for initial consultation at the request of Dr. Melody lucio. provider found for consideration of radiation therapy for metastatic esophageal cancer. All pertinent labs, imaging, and pathology findings have been reviewed.     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 Padilla is a 60 year old former smoker gentleman with a past medical history of paroxysmal atrial fibrillation, hypertension, and glaucoma with a diagnosis of metastatic esophageal carcinoma.     His oncologic diagnosis came to light in early  when he started noticing dysphagia associated with weight loss. EGD at that time demonstrated a 1-2 cm long ulceration at 40 cm from the incisors; biopsy demonstrated invasive poorly differentiated squamous cell carcinoma. PET at that time demonstrated multiple cervical, mediastinal and retroperitoneal lymph nodes with multiple hepatic hypermetabolic lesions concerning for metastatic disease. Ultrasound-guided core biopsy of the liver on 2017 demonstrated poorly differentiated carcinoma, consistent with metastatic esophageal adenocarcinoma. HER2-kei testing was negative, and he was thus initiated on palliative chemotherapy with 5-Fluorouracil and oxaliplatin every 2 weeks beginning 05/15/2017. Interval surveillance PET on 07/10/2017 demonstrated a noted response both in the primary as well as interval decrease in FDG avidity in associated nodes. 2018, he was switched to Paclitaxel and Ramcirumab due to mixed response in late 2017. He completed 4 cycles of this, and demonstrated further progression. 2018, he was enrolled in the NCI Match Clinical Trial with crizotinib due to MET amplification, and was continued on this until 2018.     More  recently, beginning in October 2018, he was switched to pembrolizumab due to PD1 overexpression, for which he has continued on treatment to present day. Interval surveillance scans demonstrated resolution of his liver metastases with keytruda.     He was discussed in Thoracic Tumor Conference on 12/22/20 to discuss concern for symptomatic progression with dysphagia.     PET/CT on 01/14/21 demonstrated a hypermetabolic mass at the GE junction and gastric cardia measuring approximately 4.0 x 3.3 cm with SUV max 18.5. The inferior portion of the mass extends to encase the celiac axis with infiltrative hypoenhancing tissue. There were associated multiple small upper abdominal and retroperitoneal lymph nodes which were similar to previous CTs. For example, there was a portocaval node measuring 3.1 x 1.2 cm with SUV max 4.2 and 2.9 x 1.2 cm with SUV max 2.2.     He has thus been referred to Radiation Oncology at the request of Dr. Kadi Dee of Medical Oncology for consideration of radiation for locoregional control of the FDG avid esophageal primary. He had noted difficulty with food getting stuck for the last few months. He has been abstaining from bread for the last couple of months due to difficulty swallowing. He states that so long as he is able to thoroughly chew things, he is able to eat without foods getting stuck; he also has been drinking plentiful fluids to help with passage. He is maintaining his weight. No nausea or vomiting. His bowels are regular, and on the looser side, approximately 1-2 stools daily.      REVIEW OF SYSTEMS: A 12 point review of systems was obtained. Pertinent findings are noted in the HPI and are otherwise unremarkable.     CHEMOTHERAPY HISTORY:   (1) Pembrolizumab: 10/23/2018 - present; 33C q3wk 200 mg IV, last dose 01/04/21  (2) FOLFOX: 05/15/2017 - 12/11/2017  (3) Paclitaxel: 01/08/2018 - 10/02/2018 (4C)  (4) Ramucirumab: 07/24/2018 - 10/02/2018 (given with paclitaxel)    PACEMAKER:  no  PAST RADIATION THERAPY HISTORY: none    PAST MEDICAL HISTORY:  - Glaucoma  - Hypertension  - Paroxysmal atrial fibrillation  - Tubular adenoma  - Esophageal cancer    PAST SURGICAL HISTORY:  - Amputation of the toe  - Knee arthroscopy  - Bunion surgery  - Cholecystectomy  - EGD  - Colonoscopy with polyp removal x 2  - Insert port vascular access     ALLERGIES:  - Penicillin g    MEDICATIONS:  Current Outpatient Medications   Medication Sig Dispense Refill     amoxicillin-clavulanate (AUGMENTIN) 875-125 MG tablet Take 1 tablet by mouth 2 times daily 20 tablet 0     diphenoxylate-atropine 2.5-0.025 MG PO tablet Take 1-2 tablets by mouth 4 times daily as needed for diarrhea (Max of 8 pills in 24 hours) (Patient not taking: Reported on 11/23/2020) 100 tablet 1     gabapentin (NEURONTIN) 300 MG capsule 2 tablets in the am and 3 tablets before bed 450 capsule 3     latanoprost (XALATAN) 0.005 % ophthalmic solution Place 1 drop into both eyes At Bedtime 1 Bottle 0     lidocaine-prilocaine (EMLA) 2.5-2.5 % external cream Apply topically as needed for moderate pain 30 g 3     multivitamin, therapeutic with minerals (MULTI-VITAMIN) TABS tablet Take 1 tablet by mouth daily       timolol (TIMOPTIC) 0.5 % ophthalmic solution Place into both eyes daily       Tocilizumab (ACTEMRA ACTPEN) 162 MG/0.9ML SOAJ Inject 0.9 mLs Subcutaneous every 7 days (Patient not taking: Reported on 11/23/2020) 3.6 mL 4     tocilizumab (ACTEMRA) 162 MG/0.9ML subcutaneous injection Inject 0.9 mLs (162 mg) Subcutaneous every 7 days Hold for signs of infection, and seek medical attention. 4 Syringe 5     warfarin ANTICOAGULANT (COUMADIN) 5 MG tablet Take 1 tablet (5 mg) by mouth daily 90 tablet 3     zolpidem (AMBIEN) 10 MG tablet Take 1 tablet (10 mg) by mouth nightly as needed (for sleep) 90 tablet 1        FAMILY HISTORY:  Maternal grandmother with breast cancer in 50s  Paternal aunt with gynecologic cancer    SOCIAL HISTORY:  - Single  - Brother  lives south of [facility], Sister Jennifer very involved in patient's care  - Has not been working since 2017  - Former 20 pack year smoker, quit in 2006  - Occasional EtOH  - Denies illicits    PHYSICAL EXAM:  VITALS: /88   Pulse 64   Wt (!) 164.4 kg (362 lb 6.4 oz)   BMI 41.88 kg/m    GEN: Appears well, alert, oriented, and in NAD  HEENT: EOMI, normal conjunctiva  NECK: supple, no palpable left supraclavicular node  CV: warm and well-perfused  RESP:breathing comfortably on room air  ABDOMEN: Soft, NT, ND, protuberant abdomen  SKIN: Normal color and turgor  NEURO: No focal deficits, CN 3-12 grossly intact, normal and symmetric motor and sensory exam in bilateral upper and lower extremities, normal gait  PSYCH: Appropriate mood and affect    ECOG PERFORMANCE STATUS: 1    RADIOLOGY/IMAGING:        IMPRESSION: Mr. Padilla is a 60 year old gentleman with metastatic esophageal cancer dating back to 2017 with recent demonstration of local, symptomatic progression on maintenance Keytruda.     RECOMMENDATION:  The patient has metastatic esophageal cancer with significant response to Keytruda. We recommend radiotherapy to control his primary lesion that still retains FDG avidity. We plan to treat the PET active primary to 5000 cGy in 25 fractions with concurrent chemotherapy.     I discussed with the patient the need for pre-emptive anti-emetics, and have provided Mr. Padilla with a prescription for Zofran.    We explained our rationale, as well as the logistics, risks, benefits, and alternatives to palliative radiotherapy to the esophagus/upper abdomen. Potential toxicities of treatment were outlined, including but not limited to, fatigue, radiation dermatitis, nausea, vomiting, diarrhea, dysphagia, inflammation and scarring of the lungs, liver, radiation induced myelopathy, esophageal stricture, fistula, tracheal stenosis, inflammation of the muscle of the heart, and secondary cancer. We encouraged Mr. Padilla to ask  questions, which we answered to the best of our ability. He agreed with this plan and consented to proceed to simulation this afternoon.     We plan to initiate radiotherapy next Thursday.    The patient was seen and discussed with staff, Dr. Costa. Thank you for involving us in the care of this patient.  Please feel free to contact us with questions or concerns at any time.    Caitlin Jamison MD PGY2  Department of Radiation Oncology  [phone]    I saw the patient with the resident.  I agree with the resident's note and plan of care.      FERNANDO Costa M.D.  Department of Radiation Oncology  [facility]    60 minutes spent on the date of the encounter doing chart review, review of test results, interpretation of tests, patient visit and documentation

## 2021-01-19 ENCOUNTER — ALLIED HEALTH/NURSE VISIT (OUTPATIENT)
Dept: RADIATION ONCOLOGY | Facility: CLINIC | Age: 61
End: 2021-01-19
Attending: RADIOLOGY
Payer: MEDICARE

## 2021-01-19 VITALS
BODY MASS INDEX: 41.88 KG/M2 | HEART RATE: 64 BPM | DIASTOLIC BLOOD PRESSURE: 88 MMHG | SYSTOLIC BLOOD PRESSURE: 132 MMHG | WEIGHT: 315 LBS

## 2021-01-19 DIAGNOSIS — C15.5 CANCER OF DISTAL THIRD OF ESOPHAGUS (H): Primary | ICD-10-CM

## 2021-01-19 PROCEDURE — 77263 THER RADIOLOGY TX PLNG CPLX: CPT | Performed by: RADIOLOGY

## 2021-01-19 PROCEDURE — 77334 RADIATION TREATMENT AID(S): CPT | Performed by: RADIOLOGY

## 2021-01-19 PROCEDURE — G0463 HOSPITAL OUTPT CLINIC VISIT: HCPCS | Mod: 25 | Performed by: RADIOLOGY

## 2021-01-19 PROCEDURE — 77334 RADIATION TREATMENT AID(S): CPT | Mod: 26 | Performed by: RADIOLOGY

## 2021-01-19 PROCEDURE — 99205 OFFICE O/P NEW HI 60 MIN: CPT | Mod: 25 | Performed by: RADIOLOGY

## 2021-01-19 RX ORDER — ONDANSETRON 8 MG/1
8 TABLET, FILM COATED ORAL EVERY 8 HOURS PRN
Qty: 90 TABLET | Refills: 1 | Status: SHIPPED | OUTPATIENT
Start: 2021-01-19 | End: 2021-01-19

## 2021-01-19 RX ORDER — ONDANSETRON 8 MG/1
8 TABLET, FILM COATED ORAL EVERY 8 HOURS PRN
Qty: 90 TABLET | Refills: 1 | Status: SHIPPED | OUTPATIENT
Start: 2021-01-19 | End: 2021-06-07

## 2021-01-19 ASSESSMENT — ENCOUNTER SYMPTOMS
NERVOUS/ANXIOUS: 0
NECK PAIN: 0
SORE THROAT: 0
WEIGHT LOSS: 0
CHILLS: 1
HEADACHES: 0
BACK PAIN: 0
VOMITING: 0
NAUSEA: 0
CONSTIPATION: 0
COUGH: 0
WHEEZING: 0
SHORTNESS OF BREATH: 0
DIARRHEA: 1
FEVER: 0
FALLS: 0
DEPRESSION: 0
HEARTBURN: 0

## 2021-01-19 NOTE — LETTER
2021           RE: Reuben Padilla  [address] [place] [id number]        Dear Colleague,    Thank you for referring your patient, Reuben Padilla, to the [facility] RADIATION ONCOLOGY. Please see a copy of my visit note below.    RADIATION ONCOLOGY CONSULT NOTE  Date of Visit: 2021    Reuben Padilla  MRN: [id number]  : 1960    Reuben Padilla is being seen today for initial consultation at the request of Dr. Oliver ref. provider found for consideration of radiation therapy for metastatic esophageal cancer. All pertinent labs, imaging, and pathology findings have been reviewed.     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 Padilla is a 60 year old former smoker gentleman with a past medical history of paroxysmal atrial fibrillation, hypertension, and glaucoma with a diagnosis of metastatic esophageal carcinoma.     His oncologic diagnosis came to light in early  when he started noticing dysphagia associated with weight loss. EGD at that time demonstrated a 1-2 cm long ulceration at 40 cm from the incisors; biopsy demonstrated invasive poorly differentiated squamous cell carcinoma. PET at that time demonstrated multiple cervical, mediastinal and retroperitoneal lymph nodes with multiple hepatic hypermetabolic lesions concerning for metastatic disease. Ultrasound-guided core biopsy of the liver on 2017 demonstrated poorly differentiated carcinoma, consistent with metastatic esophageal adenocarcinoma. HER2-kei testing was negative, and he was thus initiated on palliative chemotherapy with 5-Fluorouracil and oxaliplatin every 2 weeks beginning 05/15/2017. Interval surveillance PET on 07/10/2017 demonstrated a noted response both in the primary as well as interval decrease in FDG avidity in associated nodes. 2018, he was switched to Paclitaxel and Ramcirumab due to  mixed response in late fall 2017. He completed 4 cycles of this, and demonstrated further progression. March 2018, he was enrolled in the N[employer] Match Clinical Trial with crizotinib due to MET amplification, and was continued on this until July 2018.     More recently, beginning in October 2018, he was switched to pembrolizumab due to PD1 overexpression, for which he has continued on treatment to present day. Interval surveillance scans demonstrated resolution of his liver metastases with keytruda.     He was discussed in Thoracic Tumor Conference on 12/22/20 to discuss concern for symptomatic progression with dysphagia.     PET/CT on 01/14/21 demonstrated a hypermetabolic mass at the GE junction and gastric cardia measuring approximately 4.0 x 3.3 cm with SUV max 18.5. The inferior portion of the mass extends to encase the celiac axis with infiltrative hypoenhancing tissue. There were associated multiple small upper abdominal and retroperitoneal lymph nodes which were similar to previous CTs. For example, there was a portocaval node measuring 3.1 x 1.2 cm with SUV max 4.2 and 2.9 x 1.2 cm with SUV max 2.2.     He has thus been referred to Radiation Oncology at the request of Dr. Kadi Dee of Medical Oncology for consideration of radiation for locoregional control of the FDG avid esophageal primary. He had noted difficulty with food getting stuck for the last few months. He has been abstaining from bread for the last couple of months due to difficulty swallowing. He states that so long as he is able to thoroughly chew things, he is able to eat without foods getting stuck; he also has been drinking plentiful fluids to help with passage. He is maintaining his weight. No nausea or vomiting. His bowels are regular, and on the looser side, approximately 1-2 stools daily.      REVIEW OF SYSTEMS: A 12 point review of systems was obtained. Pertinent findings are noted in the HPI and are otherwise unremarkable.      CHEMOTHERAPY HISTORY:   (1) Pembrolizumab: 10/23/2018 - present; 33C q3wk 200 mg IV, last dose 01/04/21  (2) FOLFOX: 05/15/2017 - 12/11/2017  (3) Paclitaxel: 01/08/2018 - 10/02/2018 (4C)  (4) Ramucirumab: 07/24/2018 - 10/02/2018 (given with paclitaxel)    PACEMAKER: no  PAST RADIATION THERAPY HISTORY: none    PAST MEDICAL HISTORY:  - Glaucoma  - Hypertension  - Paroxysmal atrial fibrillation  - Tubular adenoma  - Esophageal cancer    PAST SURGICAL HISTORY:  - Amputation of the toe  - Knee arthroscopy  - Bunion surgery  - Cholecystectomy  - EGD  - Colonoscopy with polyp removal x 2  - Insert port vascular access     ALLERGIES:  - Penicillin g    MEDICATIONS:  Current Outpatient Medications   Medication Sig Dispense Refill     amoxicillin-clavulanate (AUGMENTIN) 875-125 MG tablet Take 1 tablet by mouth 2 times daily 20 tablet 0     diphenoxylate-atropine 2.5-0.025 MG PO tablet Take 1-2 tablets by mouth 4 times daily as needed for diarrhea (Max of 8 pills in 24 hours) (Patient not taking: Reported on 11/23/2020) 100 tablet 1     gabapentin (NEURONTIN) 300 MG capsule 2 tablets in the am and 3 tablets before bed 450 capsule 3     latanoprost (XALATAN) 0.005 % ophthalmic solution Place 1 drop into both eyes At Bedtime 1 Bottle 0     lidocaine-prilocaine (EMLA) 2.5-2.5 % external cream Apply topically as needed for moderate pain 30 g 3     multivitamin, therapeutic with minerals (MULTI-VITAMIN) TABS tablet Take 1 tablet by mouth daily       timolol (TIMOPTIC) 0.5 % ophthalmic solution Place into both eyes daily       Tocilizumab (ACTEMRA ACTPEN) 162 MG/0.9ML SOAJ Inject 0.9 mLs Subcutaneous every 7 days (Patient not taking: Reported on 11/23/2020) 3.6 mL 4     tocilizumab (ACTEMRA) 162 MG/0.9ML subcutaneous injection Inject 0.9 mLs (162 mg) Subcutaneous every 7 days Hold for signs of infection, and seek medical attention. 4 Syringe 5     warfarin ANTICOAGULANT (COUMADIN) 5 MG tablet Take 1 tablet (5 mg) by mouth daily  90 tablet 3     zolpidem (AMBIEN) 10 MG tablet Take 1 tablet (10 mg) by mouth nightly as needed (for sleep) 90 tablet 1        FAMILY HISTORY:  Maternal grandmother with breast cancer in 50s  Paternal aunt with gynecologic cancer    SOCIAL HISTORY:  - Single  - Brother lives south of [facility], Sister Jennifer very involved in patient's care  - Has not been working since 2017  - Former 20 pack year smoker, quit in 2006  - Occasional EtOH  - Denies illicits    PHYSICAL EXAM:  VITALS: /88   Pulse 64   Wt (!) 164.4 kg (362 lb 6.4 oz)   BMI 41.88 kg/m    GEN: Appears well, alert, oriented, and in NAD  HEENT: EOMI, normal conjunctiva  NECK: supple, no palpable left supraclavicular node  CV: warm and well-perfused  RESP:breathing comfortably on room air  ABDOMEN: Soft, NT, ND, protuberant abdomen  SKIN: Normal color and turgor  NEURO: No focal deficits, CN 3-12 grossly intact, normal and symmetric motor and sensory exam in bilateral upper and lower extremities, normal gait  PSYCH: Appropriate mood and affect    ECOG PERFORMANCE STATUS: 1    RADIOLOGY/IMAGING:        IMPRESSION: Mr. Padilla is a 60 year old gentleman with metastatic esophageal cancer dating back to 2017 with recent demonstration of local, symptomatic progression on maintenance Keytruda.     RECOMMENDATION:  The patient has metastatic esophageal cancer with significant response to Keytruda. We recommend radiotherapy to control his primary lesion that still retains FDG avidity. We plan to treat the PET active primary to 5000 cGy in 25 fractions with concurrent chemotherapy.     I discussed with the patient the need for pre-emptive anti-emetics, and have provided Mr. Padilla with a prescription for Zofran.    We explained our rationale, as well as the logistics, risks, benefits, and alternatives to palliative radiotherapy to the esophagus/upper abdomen. Potential toxicities of treatment were outlined, including but not limited to, fatigue, radiation  dermatitis, nausea, vomiting, diarrhea, dysphagia, inflammation and scarring of the lungs, liver, radiation induced myelopathy, esophageal stricture, fistula, tracheal stenosis, inflammation of the muscle of the heart, and secondary cancer. We encouraged Mr. Padilla to ask questions, which we answered to the best of our ability. He agreed with this plan and consented to proceed to simulation this afternoon.     We plan to initiate radiotherapy next Thursday.    The patient was seen and discussed with staff, Dr. Costa. Thank you for involving us in the care of this patient.  Please feel free to contact us with questions or concerns at any time.    Caitlin Jamison MD PGY2  Department of Radiation Oncology  [phone]    I saw the patient with the resident.  I agree with the resident's note and plan of care.      FERNANDO Costa M.D.  Department of Radiation Oncology  [facility]    60 minutes spent on the date of the encounter doing chart review, review of test results, interpretation of tests, patient visit and documentation        HPI    INITIAL PATIENT ASSESSMENT    Diagnosis: met esophageal cancer    Prior radiation therapy: None    Prior chemotherapy:   Protocol: Keytruda q 3 weeks  Facility: [facility]  Dates: next Monday due      Prior hormonal therapy:No    Pain Eval:  Denies    Psychosocial  Living arrangements: self  Fall Risk: independent   referral needs:     Advanced Directive: Yes - Location: cchart  Implantable Cardiac Device? No    Onset of menarche:   LMP: No LMP for male patient.  Onset of menopause:   Abnormal vaginal bleeding/discharge:   Are you pregnant?   Reproductive note: grown children    Nurse face-to-face time: Level 5:  over 15 min face to face time  Review of Systems   Constitutional: Positive for chills. Negative for fever, malaise/fatigue and weight loss.   HENT: Positive for tinnitus. Negative for congestion and sore throat.         Difficulty  swallowing   Respiratory: Negative for cough, shortness of breath and wheezing.    Cardiovascular: Negative for chest pain and leg swelling.   Gastrointestinal: Positive for diarrhea. Negative for constipation, heartburn, nausea and vomiting.   Genitourinary: Negative for urgency.   Musculoskeletal: Negative for back pain, falls and neck pain.   Skin: Negative for rash.   Neurological: Negative for headaches.        Vertigo   Endo/Heme/Allergies: Negative for environmental allergies.   Psychiatric/Behavioral: Negative for depression. The patient is not nervous/anxious.        Again, thank you for allowing me to participate in the care of your patient.        Sincerely,        Dena Costa MD

## 2021-01-19 NOTE — PROGRESS NOTES
HPI    INITIAL PATIENT ASSESSMENT    Diagnosis: met esophageal cancer    Prior radiation therapy: None    Prior chemotherapy:   Protocol: Keytruda q 3 weeks  Facility: [facility]  Dates: next Monday due      Prior hormonal therapy:No    Pain Eval:  Denies    Psychosocial  Living arrangements: self  Fall Risk: independent   referral needs:     Advanced Directive: Yes - Location: cchart  Implantable Cardiac Device? No    Onset of menarche:   LMP: No LMP for male patient.  Onset of menopause:   Abnormal vaginal bleeding/discharge:   Are you pregnant?   Reproductive note: grown children    Nurse face-to-face time: Level 5:  over 15 min face to face time  Review of Systems   Constitutional: Positive for chills. Negative for fever, malaise/fatigue and weight loss.   HENT: Positive for tinnitus. Negative for congestion and sore throat.         Difficulty swallowing   Respiratory: Negative for cough, shortness of breath and wheezing.    Cardiovascular: Negative for chest pain and leg swelling.   Gastrointestinal: Positive for diarrhea. Negative for constipation, heartburn, nausea and vomiting.   Genitourinary: Negative for urgency.   Musculoskeletal: Negative for back pain, falls and neck pain.   Skin: Negative for rash.   Neurological: Negative for headaches.        Vertigo   Endo/Heme/Allergies: Negative for environmental allergies.   Psychiatric/Behavioral: Negative for depression. The patient is not nervous/anxious.

## 2021-01-20 ENCOUNTER — TELEPHONE (OUTPATIENT)
Dept: OTOLARYNGOLOGY | Facility: CLINIC | Age: 61
End: 2021-01-20

## 2021-01-20 DIAGNOSIS — C15.5 CANCER OF DISTAL THIRD OF ESOPHAGUS (H): Primary | ICD-10-CM

## 2021-01-20 RX ORDER — ONDANSETRON 8 MG/1
8 TABLET, FILM COATED ORAL EVERY 8 HOURS PRN
Qty: 90 TABLET | Refills: 1 | Status: SHIPPED | OUTPATIENT
Start: 2021-01-20 | End: 2021-03-08

## 2021-01-20 NOTE — TELEPHONE ENCOUNTER
1. Have you noticed any changes in hearing? Yes  2. Do you have ringing, buzzing, or other sounds in your ears or head, this is also referred to as Tinnitus? Yes  3. When and where was your last hearing test? no  4. Do you feel lightheaded or foggy? No  5. Do you have a spinning sensation? Yes  6. Is there any specific position that can bring on dizziness? Getting ready to sit   7. Does looking up cause dizziness? No  8. Does getting in and our of bed cause dizziness? No  9. Does turning over in bed increase or cause dizziness? No  10. Does bending over cause dizziness? No  11. Is there anything that you can do to prevent the dizziness? no  12. Has the dizziness gotten better with time? Yes  13. Have you seen Physical Therapy for dizziness? (Please indicate clinic and as much of the location as possible): No  14. Are you being referred to a specific physician? No  15. Have you been evaluated/treated for your dizziness at any other location?  (If yes,obtian as much clinic/provider/locaiton as possible) Yes. (If yes answer the following questions:)   Have you seen any ENT, Neurology, or other providers for these symptoms?             Yes, If yes, where? 25+ years ago. Doesn't remmember if yes, who?    Have you had any balance or Audiology testing? No Have you had an MRI or CT scan of your head or neck? No    Would you like to receive your Release of Information by mail or e-mail?  e-mail

## 2021-01-21 NOTE — TELEPHONE ENCOUNTER
FUTURE VISIT INFORMATION      FUTURE VISIT INFORMATION:    Date: 3/30/2021    Time: 10AM    Location: [facility]  REFERRAL INFORMATION:    Referring provider:  Loraine Sutherland PA-C    Referring providers clinic:  [facility]     Reason for visit/diagnosis  Vertigo- Referred by Loraine Sutherland PA-C in  ONCOLOGY ADULT    RECORDS REQUESTED FROM:       Clinic name Comments Records Status Imaging Status    [facility]  1/18/2021 referral from Loraine Sutherland PA-C Epic    Imaging 1/14/2021 PET Epic PACS   [facility]    [address] [place] [address]    [phone]   9/10/2017 [facility]  req 1/21/21 - PACS                       1/21/21 11:56AM sent a message to audiology to review. Called [facility], image will be pushed shortly - Amay   *images received in PACS - Amay

## 2021-01-22 DIAGNOSIS — R42 DIZZINESS: Primary | ICD-10-CM

## 2021-01-22 NOTE — TELEPHONE ENCOUNTER
"Requesting orders for hearing test, VNG and EcochG testing prior to patient's appt with Dr. Nissen on 3/30/2021.    Per Record Review:  -Patient reports history of Meniere's disease which has recently \"come back with a vengeance.\" He is also currently undergoing radiation therapy for cancer of distal third of esophagus/metastatic esophageal cancer with liver metastases and widespread lavon metastasis, diagnosed in 2017. Notes indicate previous chemotherapy prior to current radiation treatments. Pt reports bilateral tinnitus, his ear feeling plugged and episodic vertigo, nausea and emesis.     -No previous audiogram.     Deep Schmidt. CCC-A  Licensed Audiologist   [place] #[id number]      "

## 2021-01-27 ENCOUNTER — APPOINTMENT (OUTPATIENT)
Dept: RADIATION ONCOLOGY | Facility: CLINIC | Age: 61
End: 2021-01-27
Attending: RADIOLOGY
Payer: MEDICARE

## 2021-01-27 DIAGNOSIS — T45.1X5A CHEMOTHERAPY-INDUCED NEUTROPENIA (H): Primary | ICD-10-CM

## 2021-01-27 DIAGNOSIS — C15.5 CANCER OF DISTAL THIRD OF ESOPHAGUS (H): ICD-10-CM

## 2021-01-27 DIAGNOSIS — D70.1 CHEMOTHERAPY-INDUCED NEUTROPENIA (H): Primary | ICD-10-CM

## 2021-01-27 PROCEDURE — 77280 THER RAD SIMULAJ FIELD SMPL: CPT | Performed by: RADIOLOGY

## 2021-01-27 PROCEDURE — 77386 HC IMRT TREATMENT DELIVERY, COMPLEX: CPT | Performed by: RADIOLOGY

## 2021-01-27 PROCEDURE — 77014 PR CT GUIDE FOR PLACEMENT RADIATION THERAPY FIELDS: CPT | Mod: 26 | Performed by: RADIOLOGY

## 2021-01-27 PROCEDURE — 77387 GUIDANCE FOR RADJ TX DLVR: CPT | Performed by: RADIOLOGY

## 2021-01-27 RX ORDER — HEPARIN SODIUM (PORCINE) LOCK FLUSH IV SOLN 100 UNIT/ML 100 UNIT/ML
5 SOLUTION INTRAVENOUS
Status: CANCELLED
Start: 2021-02-04

## 2021-01-27 RX ORDER — ALBUTEROL SULFATE 90 UG/1
1-2 AEROSOL, METERED RESPIRATORY (INHALATION)
Status: CANCELLED
Start: 2021-02-11

## 2021-01-27 RX ORDER — SODIUM CHLORIDE 9 MG/ML
1000 INJECTION, SOLUTION INTRAVENOUS CONTINUOUS PRN
Status: CANCELLED
Start: 2021-01-28

## 2021-01-27 RX ORDER — EPINEPHRINE 1 MG/ML
0.3 INJECTION, SOLUTION INTRAMUSCULAR; SUBCUTANEOUS EVERY 5 MIN PRN
Status: CANCELLED | OUTPATIENT
Start: 2021-01-28

## 2021-01-27 RX ORDER — ALBUTEROL SULFATE 0.83 MG/ML
2.5 SOLUTION RESPIRATORY (INHALATION)
Status: CANCELLED | OUTPATIENT
Start: 2021-02-11

## 2021-01-27 RX ORDER — ALBUTEROL SULFATE 90 UG/1
1-2 AEROSOL, METERED RESPIRATORY (INHALATION)
Status: CANCELLED
Start: 2021-02-18

## 2021-01-27 RX ORDER — DIPHENHYDRAMINE HYDROCHLORIDE 50 MG/ML
50 INJECTION INTRAMUSCULAR; INTRAVENOUS
Status: CANCELLED
Start: 2021-01-28

## 2021-01-27 RX ORDER — SODIUM CHLORIDE 9 MG/ML
1000 INJECTION, SOLUTION INTRAVENOUS CONTINUOUS PRN
Status: CANCELLED
Start: 2021-02-11

## 2021-01-27 RX ORDER — SODIUM CHLORIDE 9 MG/ML
1000 INJECTION, SOLUTION INTRAVENOUS CONTINUOUS PRN
Status: CANCELLED
Start: 2021-02-18

## 2021-01-27 RX ORDER — ALBUTEROL SULFATE 90 UG/1
1-2 AEROSOL, METERED RESPIRATORY (INHALATION)
Status: CANCELLED
Start: 2021-01-28

## 2021-01-27 RX ORDER — SODIUM CHLORIDE 9 MG/ML
1000 INJECTION, SOLUTION INTRAVENOUS CONTINUOUS PRN
Status: CANCELLED
Start: 2021-02-25

## 2021-01-27 RX ORDER — LORAZEPAM 2 MG/ML
0.5 INJECTION INTRAMUSCULAR EVERY 4 HOURS PRN
Status: CANCELLED
Start: 2021-02-25

## 2021-01-27 RX ORDER — DIPHENHYDRAMINE HYDROCHLORIDE 50 MG/ML
50 INJECTION INTRAMUSCULAR; INTRAVENOUS
Status: CANCELLED
Start: 2021-02-11

## 2021-01-27 RX ORDER — NALOXONE HYDROCHLORIDE 0.4 MG/ML
.1-.4 INJECTION, SOLUTION INTRAMUSCULAR; INTRAVENOUS; SUBCUTANEOUS
Status: CANCELLED | OUTPATIENT
Start: 2021-02-25

## 2021-01-27 RX ORDER — DIPHENHYDRAMINE HCL 25 MG
50 CAPSULE ORAL ONCE
Status: CANCELLED
Start: 2021-01-28

## 2021-01-27 RX ORDER — ALBUTEROL SULFATE 0.83 MG/ML
2.5 SOLUTION RESPIRATORY (INHALATION)
Status: CANCELLED | OUTPATIENT
Start: 2021-02-18

## 2021-01-27 RX ORDER — HEPARIN SODIUM (PORCINE) LOCK FLUSH IV SOLN 100 UNIT/ML 100 UNIT/ML
5 SOLUTION INTRAVENOUS
Status: CANCELLED
Start: 2021-01-28

## 2021-01-27 RX ORDER — MEPERIDINE HYDROCHLORIDE 25 MG/ML
25 INJECTION INTRAMUSCULAR; INTRAVENOUS; SUBCUTANEOUS EVERY 30 MIN PRN
Status: CANCELLED | OUTPATIENT
Start: 2021-02-11

## 2021-01-27 RX ORDER — MEPERIDINE HYDROCHLORIDE 25 MG/ML
25 INJECTION INTRAMUSCULAR; INTRAVENOUS; SUBCUTANEOUS EVERY 30 MIN PRN
Status: CANCELLED | OUTPATIENT
Start: 2021-02-04

## 2021-01-27 RX ORDER — HEPARIN SODIUM,PORCINE 10 UNIT/ML
5 VIAL (ML) INTRAVENOUS
Status: CANCELLED
Start: 2021-02-11

## 2021-01-27 RX ORDER — LORAZEPAM 2 MG/ML
0.5 INJECTION INTRAMUSCULAR EVERY 4 HOURS PRN
Status: CANCELLED
Start: 2021-02-18

## 2021-01-27 RX ORDER — NALOXONE HYDROCHLORIDE 0.4 MG/ML
.1-.4 INJECTION, SOLUTION INTRAMUSCULAR; INTRAVENOUS; SUBCUTANEOUS
Status: CANCELLED | OUTPATIENT
Start: 2021-02-18

## 2021-01-27 RX ORDER — METHYLPREDNISOLONE SODIUM SUCCINATE 125 MG/2ML
125 INJECTION, POWDER, LYOPHILIZED, FOR SOLUTION INTRAMUSCULAR; INTRAVENOUS
Status: CANCELLED
Start: 2021-01-28

## 2021-01-27 RX ORDER — EPINEPHRINE 1 MG/ML
0.3 INJECTION, SOLUTION INTRAMUSCULAR; SUBCUTANEOUS EVERY 5 MIN PRN
Status: CANCELLED | OUTPATIENT
Start: 2021-02-04

## 2021-01-27 RX ORDER — ALBUTEROL SULFATE 0.83 MG/ML
2.5 SOLUTION RESPIRATORY (INHALATION)
Status: CANCELLED | OUTPATIENT
Start: 2021-02-25

## 2021-01-27 RX ORDER — NALOXONE HYDROCHLORIDE 0.4 MG/ML
.1-.4 INJECTION, SOLUTION INTRAMUSCULAR; INTRAVENOUS; SUBCUTANEOUS
Status: CANCELLED | OUTPATIENT
Start: 2021-01-28

## 2021-01-27 RX ORDER — ALBUTEROL SULFATE 0.83 MG/ML
2.5 SOLUTION RESPIRATORY (INHALATION)
Status: CANCELLED | OUTPATIENT
Start: 2021-02-04

## 2021-01-27 RX ORDER — DIPHENHYDRAMINE HCL 25 MG
50 CAPSULE ORAL ONCE
Status: CANCELLED
Start: 2021-02-11

## 2021-01-27 RX ORDER — ALBUTEROL SULFATE 90 UG/1
1-2 AEROSOL, METERED RESPIRATORY (INHALATION)
Status: CANCELLED
Start: 2021-02-04

## 2021-01-27 RX ORDER — LORAZEPAM 2 MG/ML
0.5 INJECTION INTRAMUSCULAR EVERY 4 HOURS PRN
Status: CANCELLED
Start: 2021-02-04

## 2021-01-27 RX ORDER — HEPARIN SODIUM,PORCINE 10 UNIT/ML
5 VIAL (ML) INTRAVENOUS
Status: CANCELLED
Start: 2021-01-28

## 2021-01-27 RX ORDER — HEPARIN SODIUM,PORCINE 10 UNIT/ML
5 VIAL (ML) INTRAVENOUS
Status: CANCELLED
Start: 2021-02-04

## 2021-01-27 RX ORDER — ALBUTEROL SULFATE 90 UG/1
1-2 AEROSOL, METERED RESPIRATORY (INHALATION)
Status: CANCELLED
Start: 2021-02-25

## 2021-01-27 RX ORDER — LORAZEPAM 2 MG/ML
0.5 INJECTION INTRAMUSCULAR EVERY 4 HOURS PRN
Status: CANCELLED
Start: 2021-02-11

## 2021-01-27 RX ORDER — HEPARIN SODIUM (PORCINE) LOCK FLUSH IV SOLN 100 UNIT/ML 100 UNIT/ML
5 SOLUTION INTRAVENOUS
Status: CANCELLED
Start: 2021-02-11

## 2021-01-27 RX ORDER — DIPHENHYDRAMINE HCL 25 MG
50 CAPSULE ORAL ONCE
Status: CANCELLED
Start: 2021-02-18

## 2021-01-27 RX ORDER — MEPERIDINE HYDROCHLORIDE 25 MG/ML
25 INJECTION INTRAMUSCULAR; INTRAVENOUS; SUBCUTANEOUS EVERY 30 MIN PRN
Status: CANCELLED | OUTPATIENT
Start: 2021-01-28

## 2021-01-27 RX ORDER — ALBUTEROL SULFATE 0.83 MG/ML
2.5 SOLUTION RESPIRATORY (INHALATION)
Status: CANCELLED | OUTPATIENT
Start: 2021-01-28

## 2021-01-27 RX ORDER — HEPARIN SODIUM (PORCINE) LOCK FLUSH IV SOLN 100 UNIT/ML 100 UNIT/ML
5 SOLUTION INTRAVENOUS
Status: CANCELLED
Start: 2021-02-25

## 2021-01-27 RX ORDER — METHYLPREDNISOLONE SODIUM SUCCINATE 125 MG/2ML
125 INJECTION, POWDER, LYOPHILIZED, FOR SOLUTION INTRAMUSCULAR; INTRAVENOUS
Status: CANCELLED
Start: 2021-02-25

## 2021-01-27 RX ORDER — DIPHENHYDRAMINE HCL 25 MG
50 CAPSULE ORAL ONCE
Status: CANCELLED
Start: 2021-02-04

## 2021-01-27 RX ORDER — HEPARIN SODIUM,PORCINE 10 UNIT/ML
5 VIAL (ML) INTRAVENOUS
Status: CANCELLED
Start: 2021-02-25

## 2021-01-27 RX ORDER — DIPHENHYDRAMINE HYDROCHLORIDE 50 MG/ML
50 INJECTION INTRAMUSCULAR; INTRAVENOUS
Status: CANCELLED
Start: 2021-02-18

## 2021-01-27 RX ORDER — NALOXONE HYDROCHLORIDE 0.4 MG/ML
.1-.4 INJECTION, SOLUTION INTRAMUSCULAR; INTRAVENOUS; SUBCUTANEOUS
Status: CANCELLED | OUTPATIENT
Start: 2021-02-11

## 2021-01-27 RX ORDER — MEPERIDINE HYDROCHLORIDE 25 MG/ML
25 INJECTION INTRAMUSCULAR; INTRAVENOUS; SUBCUTANEOUS EVERY 30 MIN PRN
Status: CANCELLED | OUTPATIENT
Start: 2021-02-25

## 2021-01-27 RX ORDER — EPINEPHRINE 1 MG/ML
0.3 INJECTION, SOLUTION INTRAMUSCULAR; SUBCUTANEOUS EVERY 5 MIN PRN
Status: CANCELLED | OUTPATIENT
Start: 2021-02-25

## 2021-01-27 RX ORDER — METHYLPREDNISOLONE SODIUM SUCCINATE 125 MG/2ML
125 INJECTION, POWDER, LYOPHILIZED, FOR SOLUTION INTRAMUSCULAR; INTRAVENOUS
Status: CANCELLED
Start: 2021-02-04

## 2021-01-27 RX ORDER — DIPHENHYDRAMINE HCL 25 MG
50 CAPSULE ORAL ONCE
Status: CANCELLED
Start: 2021-02-25

## 2021-01-27 RX ORDER — METHYLPREDNISOLONE SODIUM SUCCINATE 125 MG/2ML
125 INJECTION, POWDER, LYOPHILIZED, FOR SOLUTION INTRAMUSCULAR; INTRAVENOUS
Status: CANCELLED
Start: 2021-02-18

## 2021-01-27 RX ORDER — DIPHENHYDRAMINE HYDROCHLORIDE 50 MG/ML
50 INJECTION INTRAMUSCULAR; INTRAVENOUS
Status: CANCELLED
Start: 2021-02-04

## 2021-01-27 RX ORDER — EPINEPHRINE 1 MG/ML
0.3 INJECTION, SOLUTION INTRAMUSCULAR; SUBCUTANEOUS EVERY 5 MIN PRN
Status: CANCELLED | OUTPATIENT
Start: 2021-02-18

## 2021-01-27 RX ORDER — METHYLPREDNISOLONE SODIUM SUCCINATE 125 MG/2ML
125 INJECTION, POWDER, LYOPHILIZED, FOR SOLUTION INTRAMUSCULAR; INTRAVENOUS
Status: CANCELLED
Start: 2021-02-11

## 2021-01-27 RX ORDER — HEPARIN SODIUM,PORCINE 10 UNIT/ML
5 VIAL (ML) INTRAVENOUS
Status: CANCELLED
Start: 2021-02-18

## 2021-01-27 RX ORDER — DIPHENHYDRAMINE HYDROCHLORIDE 50 MG/ML
50 INJECTION INTRAMUSCULAR; INTRAVENOUS
Status: CANCELLED
Start: 2021-02-25

## 2021-01-27 RX ORDER — SODIUM CHLORIDE 9 MG/ML
1000 INJECTION, SOLUTION INTRAVENOUS CONTINUOUS PRN
Status: CANCELLED
Start: 2021-02-04

## 2021-01-27 RX ORDER — LORAZEPAM 2 MG/ML
0.5 INJECTION INTRAMUSCULAR EVERY 4 HOURS PRN
Status: CANCELLED
Start: 2021-01-28

## 2021-01-27 RX ORDER — HEPARIN SODIUM (PORCINE) LOCK FLUSH IV SOLN 100 UNIT/ML 100 UNIT/ML
5 SOLUTION INTRAVENOUS
Status: CANCELLED
Start: 2021-02-18

## 2021-01-27 RX ORDER — MEPERIDINE HYDROCHLORIDE 25 MG/ML
25 INJECTION INTRAMUSCULAR; INTRAVENOUS; SUBCUTANEOUS EVERY 30 MIN PRN
Status: CANCELLED | OUTPATIENT
Start: 2021-02-18

## 2021-01-27 RX ORDER — NALOXONE HYDROCHLORIDE 0.4 MG/ML
.1-.4 INJECTION, SOLUTION INTRAMUSCULAR; INTRAVENOUS; SUBCUTANEOUS
Status: CANCELLED | OUTPATIENT
Start: 2021-02-04

## 2021-01-27 RX ORDER — EPINEPHRINE 1 MG/ML
0.3 INJECTION, SOLUTION INTRAMUSCULAR; SUBCUTANEOUS EVERY 5 MIN PRN
Status: CANCELLED | OUTPATIENT
Start: 2021-02-11

## 2021-01-28 ENCOUNTER — ONCOLOGY VISIT (OUTPATIENT)
Dept: ONCOLOGY | Facility: CLINIC | Age: 61
End: 2021-01-28
Attending: INTERNAL MEDICINE
Payer: MEDICARE

## 2021-01-28 ENCOUNTER — OFFICE VISIT (OUTPATIENT)
Dept: RADIATION ONCOLOGY | Facility: CLINIC | Age: 61
End: 2021-01-28
Attending: RADIOLOGY
Payer: MEDICARE

## 2021-01-28 ENCOUNTER — DOCUMENTATION ONLY (OUTPATIENT)
Dept: ANTICOAGULATION | Facility: CLINIC | Age: 61
End: 2021-01-28

## 2021-01-28 ENCOUNTER — ANTICOAGULATION THERAPY VISIT (OUTPATIENT)
Dept: ANTICOAGULATION | Facility: CLINIC | Age: 61
End: 2021-01-28

## 2021-01-28 ENCOUNTER — APPOINTMENT (OUTPATIENT)
Dept: LAB | Facility: CLINIC | Age: 61
End: 2021-01-28
Attending: INTERNAL MEDICINE
Payer: MEDICARE

## 2021-01-28 VITALS
SYSTOLIC BLOOD PRESSURE: 133 MMHG | WEIGHT: 315 LBS | DIASTOLIC BLOOD PRESSURE: 84 MMHG | HEART RATE: 72 BPM | BODY MASS INDEX: 40.76 KG/M2

## 2021-01-28 VITALS
SYSTOLIC BLOOD PRESSURE: 133 MMHG | RESPIRATION RATE: 16 BRPM | WEIGHT: 315 LBS | TEMPERATURE: 97.5 F | BODY MASS INDEX: 40.76 KG/M2 | HEART RATE: 72 BPM | OXYGEN SATURATION: 96 % | DIASTOLIC BLOOD PRESSURE: 84 MMHG

## 2021-01-28 DIAGNOSIS — C15.5 CANCER OF DISTAL THIRD OF ESOPHAGUS (H): ICD-10-CM

## 2021-01-28 DIAGNOSIS — M19.90 INFLAMMATORY ARTHRITIS: ICD-10-CM

## 2021-01-28 DIAGNOSIS — C15.5 CANCER OF DISTAL THIRD OF ESOPHAGUS (H): Primary | ICD-10-CM

## 2021-01-28 DIAGNOSIS — I26.99 OTHER ACUTE PULMONARY EMBOLISM WITHOUT ACUTE COR PULMONALE (H): Primary | ICD-10-CM

## 2021-01-28 DIAGNOSIS — C79.9 METASTASIS FROM GASTRIC CANCER (H): ICD-10-CM

## 2021-01-28 DIAGNOSIS — I26.99 OTHER ACUTE PULMONARY EMBOLISM WITHOUT ACUTE COR PULMONALE (H): ICD-10-CM

## 2021-01-28 DIAGNOSIS — C16.9 METASTASIS FROM GASTRIC CANCER (H): ICD-10-CM

## 2021-01-28 DIAGNOSIS — Z86.711 HX OF PULMONARY EMBOLUS: ICD-10-CM

## 2021-01-28 DIAGNOSIS — T45.1X5A CHEMOTHERAPY-INDUCED NEUTROPENIA (H): Primary | ICD-10-CM

## 2021-01-28 DIAGNOSIS — C78.7 MALIGNANT NEOPLASM METASTATIC TO LIVER (H): ICD-10-CM

## 2021-01-28 DIAGNOSIS — D70.1 CHEMOTHERAPY-INDUCED NEUTROPENIA (H): Primary | ICD-10-CM

## 2021-01-28 DIAGNOSIS — E66.01 MORBID OBESITY (H): ICD-10-CM

## 2021-01-28 DIAGNOSIS — T45.1X5A CHEMOTHERAPY-INDUCED NEUTROPENIA (H): ICD-10-CM

## 2021-01-28 DIAGNOSIS — D70.1 CHEMOTHERAPY-INDUCED NEUTROPENIA (H): ICD-10-CM

## 2021-01-28 DIAGNOSIS — K22.2 ESOPHAGEAL OBSTRUCTION: ICD-10-CM

## 2021-01-28 DIAGNOSIS — M25.50 MULTIPLE JOINT PAIN: ICD-10-CM

## 2021-01-28 DIAGNOSIS — I26.99 ACUTE PULMONARY EMBOLISM (H): ICD-10-CM

## 2021-01-28 LAB
ALBUMIN SERPL-MCNC: 4.2 G/DL (ref 3.4–5)
ALP SERPL-CCNC: 79 U/L (ref 40–150)
ALT SERPL W P-5'-P-CCNC: 58 U/L (ref 0–70)
ANION GAP SERPL CALCULATED.3IONS-SCNC: 5 MMOL/L (ref 3–14)
AST SERPL W P-5'-P-CCNC: 37 U/L (ref 0–45)
BASOPHILS # BLD AUTO: 0 10E9/L (ref 0–0.2)
BASOPHILS NFR BLD AUTO: 0.6 %
BILIRUB SERPL-MCNC: 1.4 MG/DL (ref 0.2–1.3)
BUN SERPL-MCNC: 19 MG/DL (ref 7–30)
CALCIUM SERPL-MCNC: 9 MG/DL (ref 8.5–10.1)
CHLORIDE SERPL-SCNC: 106 MMOL/L (ref 94–109)
CO2 SERPL-SCNC: 26 MMOL/L (ref 20–32)
CREAT SERPL-MCNC: 0.85 MG/DL (ref 0.66–1.25)
DIFFERENTIAL METHOD BLD: ABNORMAL
EOSINOPHIL # BLD AUTO: 0.1 10E9/L (ref 0–0.7)
EOSINOPHIL NFR BLD AUTO: 1 %
ERYTHROCYTE [DISTWIDTH] IN BLOOD BY AUTOMATED COUNT: 12.8 % (ref 10–15)
GFR SERPL CREATININE-BSD FRML MDRD: >90 ML/MIN/{1.73_M2}
GLUCOSE SERPL-MCNC: 110 MG/DL (ref 70–99)
HCT VFR BLD AUTO: 48.8 % (ref 40–53)
HGB BLD-MCNC: 17.2 G/DL (ref 13.3–17.7)
IMM GRANULOCYTES # BLD: 0 10E9/L (ref 0–0.4)
IMM GRANULOCYTES NFR BLD: 0.2 %
INR PPP: 2.62 (ref 0.86–1.14)
LYMPHOCYTES # BLD AUTO: 0.9 10E9/L (ref 0.8–5.3)
LYMPHOCYTES NFR BLD AUTO: 18.4 %
MCH RBC QN AUTO: 30.6 PG (ref 26.5–33)
MCHC RBC AUTO-ENTMCNC: 35.2 G/DL (ref 31.5–36.5)
MCV RBC AUTO: 87 FL (ref 78–100)
MONOCYTES # BLD AUTO: 0.4 10E9/L (ref 0–1.3)
MONOCYTES NFR BLD AUTO: 8 %
NEUTROPHILS # BLD AUTO: 3.5 10E9/L (ref 1.6–8.3)
NEUTROPHILS NFR BLD AUTO: 71.8 %
NRBC # BLD AUTO: 0 10*3/UL
NRBC BLD AUTO-RTO: 0 /100
PLATELET # BLD AUTO: 119 10E9/L (ref 150–450)
POTASSIUM SERPL-SCNC: 3.9 MMOL/L (ref 3.4–5.3)
PROT SERPL-MCNC: 7.5 G/DL (ref 6.8–8.8)
RBC # BLD AUTO: 5.63 10E12/L (ref 4.4–5.9)
SODIUM SERPL-SCNC: 138 MMOL/L (ref 133–144)
TSH SERPL DL<=0.005 MIU/L-ACNC: 1.79 MU/L (ref 0.4–4)
WBC # BLD AUTO: 4.9 10E9/L (ref 4–11)

## 2021-01-28 PROCEDURE — 250N000011 HC RX IP 250 OP 636: Performed by: INTERNAL MEDICINE

## 2021-01-28 PROCEDURE — 85610 PROTHROMBIN TIME: CPT | Performed by: INTERNAL MEDICINE

## 2021-01-28 PROCEDURE — 250N000009 HC RX 250: Performed by: INTERNAL MEDICINE

## 2021-01-28 PROCEDURE — 96413 CHEMO IV INFUSION 1 HR: CPT

## 2021-01-28 PROCEDURE — 85025 COMPLETE CBC W/AUTO DIFF WBC: CPT | Performed by: INTERNAL MEDICINE

## 2021-01-28 PROCEDURE — 96417 CHEMO IV INFUS EACH ADDL SEQ: CPT

## 2021-01-28 PROCEDURE — 96367 TX/PROPH/DG ADDL SEQ IV INF: CPT

## 2021-01-28 PROCEDURE — 258N000003 HC RX IP 258 OP 636: Performed by: INTERNAL MEDICINE

## 2021-01-28 PROCEDURE — 96375 TX/PRO/DX INJ NEW DRUG ADDON: CPT

## 2021-01-28 PROCEDURE — 99214 OFFICE O/P EST MOD 30 MIN: CPT | Performed by: INTERNAL MEDICINE

## 2021-01-28 PROCEDURE — 250N000013 HC RX MED GY IP 250 OP 250 PS 637: Mod: GY | Performed by: INTERNAL MEDICINE

## 2021-01-28 PROCEDURE — 77386 HC IMRT TREATMENT DELIVERY, COMPLEX: CPT | Performed by: RADIOLOGY

## 2021-01-28 PROCEDURE — 80053 COMPREHEN METABOLIC PANEL: CPT | Performed by: INTERNAL MEDICINE

## 2021-01-28 PROCEDURE — 84443 ASSAY THYROID STIM HORMONE: CPT | Performed by: INTERNAL MEDICINE

## 2021-01-28 RX ORDER — ONDANSETRON 8 MG/1
8 TABLET, FILM COATED ORAL EVERY 8 HOURS PRN
Qty: 10 TABLET | Refills: 1 | Status: SHIPPED | OUTPATIENT
Start: 2021-01-28 | End: 2021-02-03

## 2021-01-28 RX ORDER — PROCHLORPERAZINE MALEATE 10 MG
10 TABLET ORAL EVERY 6 HOURS PRN
Qty: 30 TABLET | Refills: 1 | Status: SHIPPED | OUTPATIENT
Start: 2021-01-28 | End: 2021-06-07

## 2021-01-28 RX ORDER — MECLIZINE HYDROCHLORIDE 25 MG/1
TABLET ORAL
COMMUNITY
Start: 2021-01-22 | End: 2021-06-07

## 2021-01-28 RX ORDER — LORAZEPAM 0.5 MG/1
0.5 TABLET ORAL EVERY 4 HOURS PRN
Qty: 30 TABLET | Refills: 1 | Status: SHIPPED | OUTPATIENT
Start: 2021-01-28 | End: 2021-06-07

## 2021-01-28 RX ORDER — HEPARIN SODIUM (PORCINE) LOCK FLUSH IV SOLN 100 UNIT/ML 100 UNIT/ML
5 SOLUTION INTRAVENOUS ONCE
Status: COMPLETED | OUTPATIENT
Start: 2021-01-28 | End: 2021-01-28

## 2021-01-28 RX ORDER — TRIAMTERENE/HYDROCHLOROTHIAZID 37.5-25 MG
TABLET ORAL
COMMUNITY
Start: 2021-01-22 | End: 2021-06-07

## 2021-01-28 RX ORDER — DIPHENHYDRAMINE HCL 25 MG
50 CAPSULE ORAL ONCE
Status: COMPLETED | OUTPATIENT
Start: 2021-01-28 | End: 2021-01-28

## 2021-01-28 RX ORDER — HEPARIN SODIUM (PORCINE) LOCK FLUSH IV SOLN 100 UNIT/ML 100 UNIT/ML
5 SOLUTION INTRAVENOUS
Status: DISCONTINUED | OUTPATIENT
Start: 2021-01-28 | End: 2021-01-28 | Stop reason: HOSPADM

## 2021-01-28 RX ADMIN — DIPHENHYDRAMINE HYDROCHLORIDE 50 MG: 25 CAPSULE ORAL at 11:56

## 2021-01-28 RX ADMIN — SODIUM CHLORIDE 250 ML: 9 INJECTION, SOLUTION INTRAVENOUS at 11:56

## 2021-01-28 RX ADMIN — PACLITAXEL 151 MG: 6 INJECTION, SOLUTION INTRAVENOUS at 12:35

## 2021-01-28 RX ADMIN — Medication 5 ML: at 10:42

## 2021-01-28 RX ADMIN — DEXAMETHASONE SODIUM PHOSPHATE: 10 INJECTION, SOLUTION INTRAMUSCULAR; INTRAVENOUS at 12:06

## 2021-01-28 RX ADMIN — FAMOTIDINE 20 MG: 10 INJECTION, SOLUTION INTRAVENOUS at 11:56

## 2021-01-28 RX ADMIN — CARBOPLATIN 300 MG: 10 INJECTION, SOLUTION INTRAVENOUS at 13:58

## 2021-01-28 RX ADMIN — Medication 5 ML: at 14:32

## 2021-01-28 ASSESSMENT — PAIN SCALES - GENERAL: PAINLEVEL: MILD PAIN (2)

## 2021-01-28 NOTE — PROGRESS NOTES
ANTICOAGULATION FOLLOW-UP CLINIC VISIT    Patient Name:  Reuben Padilla  Date:  2021  Contact Type:  Telephone    SUBJECTIVE:  Patient Findings     Comments:  Spoke with Levi.  He is starting a new chemotherapy today--taxol/carbo and will also start radiation today.  Levi states Dr. Castro stated this chemo/radiation may raise his INR and he will need weekly INR.          Clinical Outcomes     Comments:  Spoke with Levi.  He is starting a new chemotherapy today--taxol/carbo and will also start radiation today.  Levi states Dr. Castro stated this chemo/radiation may raise his INR and he will need weekly INR.             OBJECTIVE    Recent labs: (last 7 days)     21  1049   INR 2.62*       ASSESSMENT / PLAN  INR assessment THER    Recheck INR In: 6 DAYS    INR Location Clinic      Anticoagulation Summary  As of 2021    INR goal:  2.0-3.0   TTR:  67.7 % (10.5 mo)   INR used for dosin.62 (2021)   Warfarin maintenance plan:  5 mg (5 mg x 1) every day   Full warfarin instructions:  5 mg every day   Weekly warfarin total:  35 mg   Plan last modified:  Delfina Thomas, RN (2020)   Next INR check:  2/3/2021   Priority:  Maintenance   Target end date:  Indefinite    Indications    Acute pulmonary embolism (H) [I26.99]  Hx of pulmonary embolus [Z86.711]  Other acute pulmonary embolism without acute cor pulmonale (H) [I26.99]             Anticoagulation Episode Summary     INR check location:      Preferred lab:      Send INR reminders to:  [facility]    Comments:  [facility] (P)[phone] (F)[phone], Takes Warfarin in the am      Anticoagulation Care Providers     Provider Role Specialty Phone number    Kadi Dee MD Referring Hematology & Oncology [phone]            See the Encounter Report to view Anticoagulation Flowsheet and Dosing Calendar (Go to Encounters tab in chart review, and find the Anticoagulation Therapy Visit)    Spoke with Levi.  Recommended  he recheck INR on 2/1, but he states he will wait until 2/3.  Reviewed signs of bleeding with Levi. He understands he would need to be seen urgently if he develops any signs of bleeding.  Discussed with Alexsandra Youngblood, Pharmacist.  Talked with Levi and recommended Boost/Ensure type drink.      Lorenza Barlow RN

## 2021-01-28 NOTE — PROGRESS NOTES
WEEKLY MANAGEMENT NOTE  Radiation Oncology          Patient Name: Reuben Padilla  MRN: [id number]     Chest-Abdomen  200 cGy / 5000 cGy    1/ 25        DAILY DOSE:     200  cGy/ day,  5 times/week      DISEASE UNDER TREATMENT: Consolidation to primary after successful immunotherapy for metastatic esophageal cancer.    CHEMOTHERAPY: PACLITAXEL/ CARBOPLATIN weekly    SUBJECTIVE:    CTC V5.0 Toxicity Criteria  Fatigue: Grade 0: No toxicity  Skin: Grade 0: No toxicity  Comment:    PULMONARY:  Dyspnea: Grade 0: No toxicity    GI:  Nausea: Grade 0: No toxicity  Mucositis: Grade 0: No toxicity  Comment:        OBJECTIVE:  Wt Readings from Last 2 Encounters:   01/28/21 (!) 160 kg (352 lb 11.2 oz)   01/19/21 (!) 164.4 kg (362 lb 6.4 oz)          IMPRESSION: The patient is tolerating the treatment.  The patient set up, dose, and cone beam CT images were reviewed.      PLAN: Continue radiotherapy    PAIN MANAGEMENT PLAN: The patient does not require pain management    FERNANDO Costa M.D.  Department of Radiation Oncology  [facility]

## 2021-01-28 NOTE — LETTER
1/28/2021         RE: Reuben Padilla  [address] [facility] [address]        Dear Colleague,    Thank you for referring your patient, Reuben Padilla, to the [facility] RADIATION ONCOLOGY. Please see a copy of my visit note below.    WEEKLY MANAGEMENT NOTE  Radiation Oncology          Patient Name: Reuben Padilla  MRN: [id number]     Chest-Abdomen  200 cGy / 5000 cGy    1/ 25        DAILY DOSE:     200  cGy/ day,  5 times/week      DISEASE UNDER TREATMENT: Consolidation to primary after successful immunotherapy for metastatic esophageal cancer.    CHEMOTHERAPY: PACLITAXEL/ CARBOPLATIN weekly    SUBJECTIVE:    CTC V5.0 Toxicity Criteria  Fatigue: Grade 0: No toxicity  Skin: Grade 0: No toxicity  Comment:    PULMONARY:  Dyspnea: Grade 0: No toxicity    GI:  Nausea: Grade 0: No toxicity  Mucositis: Grade 0: No toxicity  Comment:        OBJECTIVE:  Wt Readings from Last 2 Encounters:   01/28/21 (!) 160 kg (352 lb 11.2 oz)   01/19/21 (!) 164.4 kg (362 lb 6.4 oz)          IMPRESSION: The patient is tolerating the treatment.  The patient set up, dose, and cone beam CT images were reviewed.      PLAN: Continue radiotherapy    PAIN MANAGEMENT PLAN: The patient does not require pain management    FERNANDO Costa M.D.  Department of Radiation Oncology  [facility]

## 2021-01-28 NOTE — PROGRESS NOTES
Chart reviewed with ACC RN, Due to Paclitaxel interaction, do expect INR to rise, half like ~13-20 hours.  Until see effect on INR, hard to guage how much INR will be affected.  OK to check INR 02/03/2021 as patient requested.  Also need to assess effect of radiation on ability to eat, etc. After first round.  Would encourage Boost, Ensure or [place] Instant Breakfast for more level intake of vitamin K if not taking on other foods as easily.    Alexsandra Youngblood, PharmD BCACP  Anticoagulation Clinical Pharmacist

## 2021-01-28 NOTE — NURSING NOTE
Chief Complaint   Patient presents with     Port Draw     Labs drawn from port by RN in lab. Vitals taken. Checked into next appointment.      Port accessed with 20 gauge gripper needle by RN in lab.  Port flushed with saline and heparin.  Vital signs taken.  Pt checked in to next appointment.    Rosa Farris RN

## 2021-01-28 NOTE — NURSING NOTE
"Oncology Rooming Note    January 28, 2021 10:59 AM   Reuben Padilla is a 60 year old male who presents for:    Chief Complaint   Patient presents with     Port Draw     Labs drawn from port by RN in lab. Vitals taken. Checked into next appointment.      Initial Vitals: Blood Pressure 133/84 (BP Location: Right arm, Patient Position: Sitting, Cuff Size: Adult Regular)   Pulse 72   Temperature 97.5  F (36.4  C) (Oral)   Respiration 16   Weight (Abnormal) 160 kg (352 lb 11.2 oz)   Oxygen Saturation 96%   Body Mass Index 40.76 kg/m   Estimated body mass index is 40.76 kg/m  as calculated from the following:    Height as of 12/14/20: 1.981 m (6' 6\").    Weight as of this encounter: 160 kg (352 lb 11.2 oz). Body surface area is 2.97 meters squared.  Mild Pain (2) Comment: Data Unavailable   No LMP for male patient.  Allergies reviewed: Yes  Medications reviewed: Yes    Medications: Medication refills not needed today.  Pharmacy name entered into EPIC:    [id number] - [place] - [address] [facility] - [place], [place] - [address] [facility] MAIL DELIVERY - [facility] [address]  [facility] - [place], [place] - [address]    Clinical concerns: none       Sushma Tyler MA            "

## 2021-01-28 NOTE — PROGRESS NOTES
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 His tumor is positive for cytokeratin 20, negative for P63 and CK7, and HER2 is negative. He started on FOLFOX (5FU/oxaliplatin) on 5/15/2017. He had a delay in treatment from 9/5-10/2/17 due to work related injury.     He had an excellent response by imaging throughout the summer 2017.    He a mixed response by imaging in late fall 2017.    In January 2018, he was switched to taxol and cyramza - had slight progression and neuropathy and clinical trial became available.        In March 2018, he was started on the [facility] Match Clinical Trial with crizotinib.  (MET amplification) He remained on crizotinib from March - July 2018.     August 2018, he was switched back to taxol/cramza.  In October, he was switched to Keytruda (PD1 overexpressor).     In April 2019, his infusion was held for three weeks, and he was treated with prednisone and Enbrel for grade 3 arthralgias. Keytruda was resumed.      INTERVAL HISTORY:   In the summer of 2020, he was transitioned to Keytruda every six weeks, based on updated data.  His arthralgias and arthritis improved.  He has also been using Actembra.  He is on no prednisone.      Levi called a few weeks ago with concerns for recurrent dysphagia.  His pembrolizumab was increased to every three weeks.  He understands he is having more local progression.  His metastatic disease is stable.  His anxious to now start chemoradiation.       He has had no issues with fevers or chills, no chest pain or shortness of breath, no nausea, vomiting, diarrhea or constipation.  No rash       He has no abdminal pain.  He modifies his diet so  as to minimize his dysphagia.    He reports fatigue, overall this is stable, but not much better.          ROS: 10 point ROS neg other than the symptoms noted above in the HPI.     PHYSICAL EXAMINATION  /84 (BP Location: Right arm, Patient Position: Sitting, Cuff Size: Adult Regular)   Pulse 72   Temp 97.5  F (36.4  C) (Oral)   Resp 16   Wt (!) 160 kg (352 lb 11.2 oz)   SpO2 96%   BMI 40.76 kg/m    352 lbs 11.2 oz  Alert, oriented, no distress.  Moist mucosae.  Supple neck, no lymphadenopathy.  Clear AE bilaterally.  Port clean  Regular heart sounds  Obese abdomen, no murmurs.  Extensive telangiectasias bilateral LE, absent reflexes at the knees.    No edema.   No gross focal deficits.  Musc:  5/5 strength in hands, quads      Labs:      Lab Results   Component Value Date    WBC 4.9 01/28/2021     Lab Results   Component Value Date    RBC 5.63 01/28/2021     Lab Results   Component Value Date    HGB 17.2 01/28/2021     Lab Results   Component Value Date    HCT 48.8 01/28/2021     No components found for: MCT  Lab Results   Component Value Date    MCV 87 01/28/2021     Lab Results   Component Value Date    MCH 30.6 01/28/2021     Lab Results   Component Value Date    MCHC 35.2 01/28/2021     Lab Results   Component Value Date    RDW 12.8 01/28/2021     Lab Results   Component Value Date     01/28/2021       Recent Labs   Lab Test 01/28/21  1049 01/04/21  0940    144   POTASSIUM 3.9 3.9   CHLORIDE 106 112*   CO2 26 26   ANIONGAP 5 6   * 109*   BUN 19 14   CR 0.85 0.84   NIDHI 9.0 8.6     Liver Function Studies -   Recent Labs   Lab Test 01/28/21  1049   PROTTOTAL 7.5   ALBUMIN 4.2   BILITOTAL 1.4*   ALKPHOS 79   AST 37   ALT 58       IMPRESSION/PLAN:  1. Metastatic esophogeal adenocarcinoma with progressive local disease at the GE junction.    I reviewed with Levi that we would like to proceed with chemoradiation today using carboplatin and taxol with radiation.  He has met with Dr Costa.   He is anxious to start therapy.    We discussed the side effects of chemotherapy including myelosuppression, nausea/vomiting/diarrhea/constipation, hair loss, neuropathy, dehydration, cardiomyopathy, etc.  The patient will be receiving chemotherapy teaching through my nurse coordinator with handouts describing all of the side effects again.  Consent for chemotherapy was obtained.    We reviewed with him that I will plan to have him seen weekly through his treatment.      He will need reimaged approximately 8 weeks after completing therapy.  I discussed with him that we will decide at that time whether to restart treatment, or monitor him.      The QUILT trial may be an option for him in the future if he is off of Actemra.     2. H/o PE 2018.   He has progressed through Xarelto in the past (didn't take it regularly).  He was then on lovenox but his insurance changed and this became unaffordable. He is now on Coumadin and labs drawn  at [facility] in [place] fax ([phone]).  He is monitored by our coumadin clinic.  We reviewed that his chemo may affect his INR and it will need to be monitored closely.     3. Baseline neuropathy and got worse with chemo and is on gabapentin 600/300/600 with relief.    4. Nutrition - he understands nutrition may become harder as he progresses through chemoradiation.  Discussed use of carafate.  Will monitor.            Kadi Dee MD

## 2021-01-28 NOTE — PROGRESS NOTES
Infusion Nursing Note:  Reuben Padilla presents today for Cycle 1 Day 1 Taxol, Carboplatin.    Patient seen by provider today: Yes: Dr. Dee    Note: [facility] Medications reviewed with patient. Patient has previously had taxol, but not carboplatin. Taxol infused at titrated rates as ordered and tolerated without incident.     Intravenous Access:  Implanted Port.    Treatment Conditions:  Lab Results   Component Value Date    HGB 17.2 01/28/2021     Lab Results   Component Value Date    WBC 4.9 01/28/2021      Lab Results   Component Value Date    ANEU 3.5 01/28/2021     Lab Results   Component Value Date     01/28/2021      Lab Results   Component Value Date     01/28/2021                     Lab Results   Component Value Date    CR 0.85 01/28/2021                   Results reviewed, labs MET treatment parameters, ok to proceed with treatment.      Post Infusion Assessment:  Patient tolerated infusion without incident.  Blood return noted pre and post infusion.  Access discontinued per protocol.       Discharge Plan:   Prescriptions given for ativan, compazine.  Discharge instructions reviewed with: Patient.  Patient verbalized understanding of discharge instructions and all questions answered.  AVS to patient via [employer]. Patient will return 2/4/21 for next RTC and infusion appointment.   Patient discharged in stable condition accompanied by: self to radiation.  Face to Face time: 0.    VIKRAM GARDNER RN

## 2021-01-28 NOTE — PATIENT INSTRUCTIONS
[facility] Triage and after hours / weekends / holidays:  [phone]    Please call the triage or after hours line if you experience a temperature greater than or equal to 100.5, shaking chills, have uncontrolled nausea, vomiting and/or diarrhea, dizziness, shortness of breath, chest pain, bleeding, unexplained bruising, or if you have any other new/concerning symptoms, questions or concerns.      If you are having any concerning symptoms or wish to speak to a provider before your next infusion visit, please call your care coordinator or triage to notify them so we can adequately serve you.     If you need a refill on a narcotic prescription or other medication, please call before your infusion appointment.       Recent Results (from the past 24 hour(s))   *CBC with platelets differential    Collection Time: 01/28/21 10:49 AM   Result Value Ref Range    WBC 4.9 4.0 - 11.0 10e9/L    RBC Count 5.63 4.4 - 5.9 10e12/L    Hemoglobin 17.2 13.3 - 17.7 g/dL    Hematocrit 48.8 40.0 - 53.0 %    MCV 87 78 - 100 fl    MCH 30.6 26.5 - 33.0 pg    MCHC 35.2 31.5 - 36.5 g/dL    RDW 12.8 10.0 - 15.0 %    Platelet Count 119 (L) 150 - 450 10e9/L    Diff Method Automated Method     % Neutrophils 71.8 %    % Lymphocytes 18.4 %    % Monocytes 8.0 %    % Eosinophils 1.0 %    % Basophils 0.6 %    % Immature Granulocytes 0.2 %    Nucleated RBCs 0 0 /100    Absolute Neutrophil 3.5 1.6 - 8.3 10e9/L    Absolute Lymphocytes 0.9 0.8 - 5.3 10e9/L    Absolute Monocytes 0.4 0.0 - 1.3 10e9/L    Absolute Eosinophils 0.1 0.0 - 0.7 10e9/L    Absolute Basophils 0.0 0.0 - 0.2 10e9/L    Abs Immature Granulocytes 0.0 0 - 0.4 10e9/L    Absolute Nucleated RBC 0.0    Comprehensive metabolic panel    Collection Time: 01/28/21 10:49 AM   Result Value Ref Range    Sodium 138 133 - 144 mmol/L    Potassium 3.9 3.4 - 5.3 mmol/L    Chloride 106 94 - 109 mmol/L    Carbon Dioxide 26 20 - 32 mmol/L    Anion Gap 5 3 - 14 mmol/L    Glucose 110 (H) 70 - 99 mg/dL    Urea  Nitrogen 19 7 - 30 mg/dL    Creatinine 0.85 0.66 - 1.25 mg/dL    GFR Estimate >90 >60 mL/min/[1.73_m2]    GFR Estimate If Black >90 >60 mL/min/[1.73_m2]    Calcium 9.0 8.5 - 10.1 mg/dL    Bilirubin Total 1.4 (H) 0.2 - 1.3 mg/dL    Albumin 4.2 3.4 - 5.0 g/dL    Protein Total 7.5 6.8 - 8.8 g/dL    Alkaline Phosphatase 79 40 - 150 U/L    ALT 58 0 - 70 U/L    AST 37 0 - 45 U/L   INR    Collection Time: 01/28/21 10:49 AM   Result Value Ref Range    INR 2.62 (H) 0.86 - 1.14   TSH with free T4 reflex    Collection Time: 01/28/21 10:49 AM   Result Value Ref Range    TSH 1.79 0.40 - 4.00 mU/L

## 2021-01-28 NOTE — PROGRESS NOTES
ANTICOAGULATION MANAGEMENT      Reuben Padilla due for annual renewal of referral to anticoagulation monitoring. Order pended for your review and signature.      ANTICOAGULATION SUMMARY      Warfarin indication(s)     PE    Heart valve present?  NO       Current goal range   INR: 2.0-3.0     Goal appropriate for indication? Yes, INR 2-3 appropriate for hx of DVT, PE, hypercoagulable state, Afib, LVAD, or bileaflet AVR without risk factors     Current duration of therapy Indefinite/long term therapy   Time in Therapeutic Range (TTR)  (Goal > 60%) 67.7 %       Office visit with referring provider's group within last year yes on 1/28/21       Lorenza Barlow RN

## 2021-01-29 ENCOUNTER — APPOINTMENT (OUTPATIENT)
Dept: RADIATION ONCOLOGY | Facility: CLINIC | Age: 61
End: 2021-01-29
Attending: RADIOLOGY
Payer: MEDICARE

## 2021-01-29 PROCEDURE — 77386 HC IMRT TREATMENT DELIVERY, COMPLEX: CPT | Performed by: RADIOLOGY

## 2021-01-29 PROCEDURE — 77014 PR CT GUIDE FOR PLACEMENT RADIATION THERAPY FIELDS: CPT | Mod: 26 | Performed by: RADIOLOGY

## 2021-02-01 ENCOUNTER — APPOINTMENT (OUTPATIENT)
Dept: RADIATION ONCOLOGY | Facility: CLINIC | Age: 61
End: 2021-02-01
Attending: RADIOLOGY
Payer: MEDICARE

## 2021-02-01 PROCEDURE — 77014 PR CT GUIDE FOR PLACEMENT RADIATION THERAPY FIELDS: CPT | Mod: 26 | Performed by: RADIOLOGY

## 2021-02-01 PROCEDURE — 77386 HC IMRT TREATMENT DELIVERY, COMPLEX: CPT | Performed by: RADIOLOGY

## 2021-02-02 ENCOUNTER — APPOINTMENT (OUTPATIENT)
Dept: RADIATION ONCOLOGY | Facility: CLINIC | Age: 61
End: 2021-02-02
Attending: RADIOLOGY
Payer: MEDICARE

## 2021-02-02 PROCEDURE — 77427 RADIATION TX MANAGEMENT X5: CPT | Performed by: RADIOLOGY

## 2021-02-02 PROCEDURE — 77386 HC IMRT TREATMENT DELIVERY, COMPLEX: CPT | Performed by: RADIOLOGY

## 2021-02-02 PROCEDURE — 77014 PR CT GUIDE FOR PLACEMENT RADIATION THERAPY FIELDS: CPT | Mod: 26 | Performed by: RADIOLOGY

## 2021-02-02 PROCEDURE — 77336 RADIATION PHYSICS CONSULT: CPT | Performed by: RADIOLOGY

## 2021-02-03 ENCOUNTER — APPOINTMENT (OUTPATIENT)
Dept: LAB | Facility: CLINIC | Age: 61
End: 2021-02-03
Attending: INTERNAL MEDICINE
Payer: MEDICARE

## 2021-02-03 ENCOUNTER — APPOINTMENT (OUTPATIENT)
Dept: RADIATION ONCOLOGY | Facility: CLINIC | Age: 61
End: 2021-02-03
Attending: RADIOLOGY
Payer: MEDICARE

## 2021-02-03 ENCOUNTER — ONCOLOGY VISIT (OUTPATIENT)
Dept: ONCOLOGY | Facility: CLINIC | Age: 61
End: 2021-02-03
Attending: PHYSICIAN ASSISTANT
Payer: MEDICARE

## 2021-02-03 VITALS
OXYGEN SATURATION: 95 % | RESPIRATION RATE: 16 BRPM | DIASTOLIC BLOOD PRESSURE: 88 MMHG | TEMPERATURE: 97.3 F | WEIGHT: 315 LBS | SYSTOLIC BLOOD PRESSURE: 127 MMHG | BODY MASS INDEX: 40.05 KG/M2 | HEART RATE: 76 BPM

## 2021-02-03 DIAGNOSIS — D70.1 CHEMOTHERAPY-INDUCED NEUTROPENIA (H): ICD-10-CM

## 2021-02-03 DIAGNOSIS — T45.1X5A CHEMOTHERAPY-INDUCED NEUTROPENIA (H): ICD-10-CM

## 2021-02-03 DIAGNOSIS — C79.9 METASTASIS FROM GASTRIC CANCER (H): ICD-10-CM

## 2021-02-03 DIAGNOSIS — M25.50 MULTIPLE JOINT PAIN: ICD-10-CM

## 2021-02-03 DIAGNOSIS — C16.9 METASTASIS FROM GASTRIC CANCER (H): ICD-10-CM

## 2021-02-03 DIAGNOSIS — C15.5 CANCER OF DISTAL THIRD OF ESOPHAGUS (H): Primary | ICD-10-CM

## 2021-02-03 DIAGNOSIS — I26.99 OTHER ACUTE PULMONARY EMBOLISM WITHOUT ACUTE COR PULMONALE (H): ICD-10-CM

## 2021-02-03 DIAGNOSIS — E66.01 MORBID OBESITY (H): ICD-10-CM

## 2021-02-03 DIAGNOSIS — Z86.711 HX OF PULMONARY EMBOLUS: ICD-10-CM

## 2021-02-03 DIAGNOSIS — M19.90 INFLAMMATORY ARTHRITIS: ICD-10-CM

## 2021-02-03 DIAGNOSIS — C78.7 MALIGNANT NEOPLASM METASTATIC TO LIVER (H): ICD-10-CM

## 2021-02-03 LAB
ALBUMIN SERPL-MCNC: 4 G/DL (ref 3.4–5)
ALP SERPL-CCNC: 76 U/L (ref 40–150)
ALT SERPL W P-5'-P-CCNC: 34 U/L (ref 0–70)
ANION GAP SERPL CALCULATED.3IONS-SCNC: 8 MMOL/L (ref 3–14)
AST SERPL W P-5'-P-CCNC: 16 U/L (ref 0–45)
BASOPHILS # BLD AUTO: 0 10E9/L (ref 0–0.2)
BASOPHILS NFR BLD AUTO: 0.6 %
BILIRUB SERPL-MCNC: 2.1 MG/DL (ref 0.2–1.3)
BUN SERPL-MCNC: 25 MG/DL (ref 7–30)
CALCIUM SERPL-MCNC: 8.9 MG/DL (ref 8.5–10.1)
CHLORIDE SERPL-SCNC: 101 MMOL/L (ref 94–109)
CO2 SERPL-SCNC: 27 MMOL/L (ref 20–32)
CREAT SERPL-MCNC: 1.08 MG/DL (ref 0.66–1.25)
DIFFERENTIAL METHOD BLD: ABNORMAL
EOSINOPHIL # BLD AUTO: 0.1 10E9/L (ref 0–0.7)
EOSINOPHIL NFR BLD AUTO: 0.9 %
ERYTHROCYTE [DISTWIDTH] IN BLOOD BY AUTOMATED COUNT: 12.5 % (ref 10–15)
GFR SERPL CREATININE-BSD FRML MDRD: 74 ML/MIN/{1.73_M2}
GLUCOSE SERPL-MCNC: 110 MG/DL (ref 70–99)
HCT VFR BLD AUTO: 46.3 % (ref 40–53)
HGB BLD-MCNC: 16.1 G/DL (ref 13.3–17.7)
IMM GRANULOCYTES # BLD: 0.1 10E9/L (ref 0–0.4)
IMM GRANULOCYTES NFR BLD: 1.1 %
LYMPHOCYTES # BLD AUTO: 0.7 10E9/L (ref 0.8–5.3)
LYMPHOCYTES NFR BLD AUTO: 13.3 %
MCH RBC QN AUTO: 30.3 PG (ref 26.5–33)
MCHC RBC AUTO-ENTMCNC: 34.8 G/DL (ref 31.5–36.5)
MCV RBC AUTO: 87 FL (ref 78–100)
MONOCYTES # BLD AUTO: 0.2 10E9/L (ref 0–1.3)
MONOCYTES NFR BLD AUTO: 3.6 %
NEUTROPHILS # BLD AUTO: 4.3 10E9/L (ref 1.6–8.3)
NEUTROPHILS NFR BLD AUTO: 80.5 %
NRBC # BLD AUTO: 0 10*3/UL
NRBC BLD AUTO-RTO: 0 /100
PLATELET # BLD AUTO: 125 10E9/L (ref 150–450)
POTASSIUM SERPL-SCNC: 4 MMOL/L (ref 3.4–5.3)
PROT SERPL-MCNC: 7.6 G/DL (ref 6.8–8.8)
RBC # BLD AUTO: 5.32 10E12/L (ref 4.4–5.9)
SODIUM SERPL-SCNC: 135 MMOL/L (ref 133–144)
TSH SERPL DL<=0.005 MIU/L-ACNC: 1.56 MU/L (ref 0.4–4)
WBC # BLD AUTO: 5.3 10E9/L (ref 4–11)

## 2021-02-03 PROCEDURE — 250N000011 HC RX IP 250 OP 636: Performed by: PHYSICIAN ASSISTANT

## 2021-02-03 PROCEDURE — G0463 HOSPITAL OUTPT CLINIC VISIT: HCPCS

## 2021-02-03 PROCEDURE — 85025 COMPLETE CBC W/AUTO DIFF WBC: CPT | Performed by: INTERNAL MEDICINE

## 2021-02-03 PROCEDURE — 99215 OFFICE O/P EST HI 40 MIN: CPT | Performed by: PHYSICIAN ASSISTANT

## 2021-02-03 PROCEDURE — 80053 COMPREHEN METABOLIC PANEL: CPT | Performed by: INTERNAL MEDICINE

## 2021-02-03 PROCEDURE — 77386 HC IMRT TREATMENT DELIVERY, COMPLEX: CPT | Performed by: RADIOLOGY

## 2021-02-03 PROCEDURE — 36591 DRAW BLOOD OFF VENOUS DEVICE: CPT

## 2021-02-03 PROCEDURE — 84443 ASSAY THYROID STIM HORMONE: CPT | Performed by: INTERNAL MEDICINE

## 2021-02-03 PROCEDURE — G0463 HOSPITAL OUTPT CLINIC VISIT: HCPCS | Mod: 25

## 2021-02-03 RX ORDER — HEPARIN SODIUM (PORCINE) LOCK FLUSH IV SOLN 100 UNIT/ML 100 UNIT/ML
5 SOLUTION INTRAVENOUS ONCE
Status: COMPLETED | OUTPATIENT
Start: 2021-02-03 | End: 2021-02-03

## 2021-02-03 RX ORDER — DIPHENHYDRAMINE HCL 25 MG
25 CAPSULE ORAL ONCE
Status: CANCELLED
Start: 2021-02-04

## 2021-02-03 RX ORDER — PANTOPRAZOLE SODIUM 40 MG/1
40 TABLET, DELAYED RELEASE ORAL DAILY
Qty: 30 TABLET | Refills: 3 | Status: SHIPPED | OUTPATIENT
Start: 2021-02-03 | End: 2021-03-08

## 2021-02-03 RX ADMIN — Medication 5 ML: at 07:41

## 2021-02-03 ASSESSMENT — PAIN SCALES - GENERAL: PAINLEVEL: NO PAIN (0)

## 2021-02-03 NOTE — NURSING NOTE
"Oncology Rooming Note    February 3, 2021 7:56 AM   Reuben Padilla is a 60 year old male who presents for:    Chief Complaint   Patient presents with     Oncology Clinic Visit     Esophageal cancer     Port Draw     Labs drawn from port by RN in lab. Vitals taken. Checked into next appointment.      Initial Vitals: /88 (BP Location: Left arm, Patient Position: Sitting, Cuff Size: Adult Small)   Pulse 76   Temp 97.3  F (36.3  C) (Oral)   Resp 16   Wt (!) 157.2 kg (346 lb 9.6 oz)   SpO2 95%   BMI 40.05 kg/m   Estimated body mass index is 40.05 kg/m  as calculated from the following:    Height as of 12/14/20: 1.981 m (6' 6\").    Weight as of this encounter: 157.2 kg (346 lb 9.6 oz). Body surface area is 2.94 meters squared.  No Pain (0) Comment: Data Unavailable   No LMP for male patient.  Allergies reviewed: Yes  Medications reviewed: Yes    Medications: Medication refills not needed today.  Pharmacy name entered into EPIC:    [id number] - [place], [place] - [address] [facility] [address] - [place], [place] - [address] [facility] MAIL DELIVERY - [place], [place] - [address]  [facility] - [place], [place] - [address]    Clinical concerns: NONE       Chelo Borja CMA            "

## 2021-02-03 NOTE — PROGRESS NOTES
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 His tumor is positive for cytokeratin 20, negative for P63 and CK7, and HER2 is negative. He started on FOLFOX (5FU/oxaliplatin) on 5/15/2017. He had a delay in treatment from 9/5-10/2/17 due to work related injury.     He had an excellent response by imaging throughout the summer 2017.    He a mixed response by imaging in late fall 2017.    In January 2018, he was switched to taxol and cyramza - had slight progression and neuropathy and clinical trial became available.        In March 2018, he was started on the [facility] Match Clinical Trial with crizotinib.  (MET amplification) He remained on crizotinib from March - July 2018.     August 2018, he was switched back to taxol/cramza.  In October 2018, he was switched to Keytruda (PD1 overexpressor).              -April 2019, his infusion was held for three weeks, and he was treated with prednisone and Enbrel for grade 3 arthralgias. Keytruda was resumed              -May 2020 he started Actemra (5/14).  This was initially WEEKLY and then in August- moved to QOW (secondary to fatigue and concerns for low WBC).                -August 2020 moved to q6 week Keytruda (still at the 200 mg dose)              -November 2020 moved back to q3w Keytruda  CT 12/30/2020: progression of esophageal mass   1/14/21: PET showing growth of primary tumor only, discussion of radiation   1/28/21: started chemoradiation with weekly carbo/taxol and daily radiation    INTERVAL HISTORY:   Levi started chemoradiation last week.  Overall things went OK.  He is taking Zofran BID and had no ANALI.   Has been losing weight- still eating some solids (chicken breast, potato wedges, sand  which) and just really good about washing them down.  Drinking water/pedialyte- about 1/2 gallon daily.  He did start a diuretic from his local ENT- they were unsure if this was Meniere's disease, but the diuretic was started 2 weeks ago and may have contributed. He admits he has been taking 2 diuretics daily.  He has not had another severe episode of vertigo for weeks now.       He has had no issues with fevers or chills, no chest pain or shortness of breath, no nausea, vomiting, or constipation.  No rash.  Has been tired, staying at his brothers in [place].  Sleeping OK at night.  Trying to walk outside a little each day.  Stamina is stable.  He feels a little head-fuzzy, but no orthostasis or dehydration.       All his arthralgias (hands and chronic R knee) pain resolved after the IV dex.  He felt fantastic for about 3 days.  Then on Sunday the pains/aches came back.  He also feels the numbness in his feet may be a little louder than prior.   Occ has some balance issues, unchanged, recently got new orthotics and new shoes.      He is on Coumadin now. His INR is being managed in [place].          ROS: 10 point ROS neg other than the symptoms noted above in the HPI.     PHYSICAL EXAMINATION  /88 (BP Location: Left arm, Patient Position: Sitting, Cuff Size: Adult Small)   Pulse 76   Temp 97.3  F (36.3  C) (Oral)   Resp 16   Wt (!) 157.2 kg (346 lb 9.6 oz)   SpO2 95%   BMI 40.05 kg/m    Wt Readings from Last 4 Encounters:   02/03/21 (!) 157.2 kg (346 lb 9.6 oz)   01/28/21 (!) 160 kg (352 lb 11.2 oz)   01/28/21 (!) 160 kg (352 lb 11.2 oz)   01/19/21 (!) 164.4 kg (362 lb 6.4 oz)       Vital signs were reviewed.   Patient alert and oriented x3.   PERRLA. EOMI. No scleral icterus noted. OP without thrush/sores.  Neck exam: No palpable cervical, supraclavicular or axillary nodes bilaterally.   Heart: RRR no murmurs noted.   Lungs: clear to auscultation bilaterally.  No crackles or wheezing.   Abd:  positive bowel sounds in all four quadrants. Obese.  No tenderness to palpation.  No hepatomegaly.   Extremities: No lower extremity edema but dark stasis dermatits  Neuro: grossly intact.   Mood and affect is stable.         Labs:      1/4/2021 09:40 1/28/2021 10:49 2/3/2021 07:49   WBC 2.7 (L) 4.9 5.3   Hemoglobin 15.3 17.2 16.1   Hematocrit 45.2 48.8 46.3   Platelet Count 108 (L) 119 (L) 125 (L)   RBC Count 5.09 5.63 5.32   MCV 89 87 87   MCH 30.1 30.6 30.3   MCHC 33.8 35.2 34.8   RDW 12.4 12.8 12.5   Diff Method Automated Method Automated Method Automated Method   % Neutrophils 51.5 71.8 80.5   % Lymphocytes 38.0 18.4 13.3   % Monocytes 9.0 8.0 3.6   % Eosinophils 1.1 1.0 0.9   % Basophils 0.4 0.6 0.6   % Immature Granulocytes 0.0 0.2 1.1   Nucleated RBCs 0 0 0   Absolute Neutrophil 1.4 (L) 3.5 4.3   Absolute Lymphocytes 1.0 0.9 0.7 (L)      12/14/2020 10:46 1/4/2021 09:40 1/28/2021 10:49 2/3/2021 07:49   Sodium 142 144 138 135   Potassium 3.9 3.9 3.9 4.0   Chloride 112 (H) 112 (H) 106 101   Carbon Dioxide 24 26 26 27   Urea Nitrogen 13 14 19 25   Creatinine 0.86 0.84 0.85 1.08   GFR Estimate >90 >90 >90 74   GFR Estimate If Black >90 >90 >90 86   Calcium 8.6 8.6 9.0 8.9   Anion Gap 7 6 5 8   Albumin 3.8 3.6 4.2 4.0   Protein Total 7.0 6.8 7.5 7.6   Bilirubin Total 1.1 0.6 1.4 (H) 2.1 (H)   Alkaline Phosphatase 76 69 79 76   ALT 32 32 58 34   AST 18 19 37 16   TSH 2.00 3.37 1.79 1.56        IMPRESSION/PLAN:  1. Metastatic esophogeal adenocarcinoma. He has stable disease on Keytruda, for TWO years now, which is great news.  Given ongoing arthralgias and fatigue and stable disease, his Keytruda was dropped back to every 6 weeks, still at the 200 mg dose.  He saw Dr Dee in early November and his CT was mildly progressed at his GE junction tumor.  His dose was changed back to 200 mg every 3 weeks (verses 6 weeks). Levi noted worsening dysphagia, thus CT CAP was done on 12/30- showing ongoing primary tumor  "worsening with no evidence of metastatic disease.     PET showed only active disease at his primary site and given his dysphagia, Levi started concurrent chemoradiation with weekly carbo/taxol.  We will have to watch his neuropathy very closely.  OK for week 2 tomorrow.      2. Immune related arthralgias- still thenar atrophy in his hands, but good strength.  No actual pain, just stiffness.  He is off all immune medications at this time.  The steroids helped his hands and now he feels they are stable.      3. H/o PE 2018.   He has progressed through Xarelto in the past (didn't take it regularly).  He was then on lovenox but his insurance changed and this became unaffordable. He is now on Coumadin and labs drawn  at [facility] in [place] fax ([phone]).  He is monitored by our coumadin clinic.     4. Baseline neuropathy is marginally worse in his legs with just numbness, none in his hands.  Continues on gabapentin BID, its primarily sensory in his feet only.  Has some balance issues on occasion, likely 2/2 to his weakness--this is stable.  Just saw podiatry and has orthotics made.      5. Weight loss: down about 20 pounds in a couple weeks.  Was given diuretic for possible Meniere's.  Levi admits he was taking \"2\" pills daily.  Is fairly dry today- dry mouth, tired, etc.  Asked him to stop today, OK to restart 1 pill in a couple days.  Work on hydration.     6. Possible appendicitis on last CT? Reviewed, improved and currently no symptoms    7. Episode of tinnitus and vertigo- Levi had an intense episode of vertigo last week when moving positions in his recliner, associated with diaphoresis and nausea.  Symptoms resolved over night, but then the next evening with the same position change in his chair, he had another episode.  Discussed positional vertigo verses Meniere's - seeing ENT and PT.      8. Elevated t.bili- unsure if this is related to dehydration? LFTs totally normal and the only " change for him is over diuresis.  I will just watch this next week.       50 minutes spent on the date of the encounter doing chart review, review of test results, interpretation of tests, patient visit, documentation and discussion with other provider(s)     Nicole Sutherland PA-c

## 2021-02-03 NOTE — NURSING NOTE
Chief Complaint   Patient presents with     Oncology Clinic Visit     Esophageal cancer     Port Draw     Labs drawn from port by RN in lab. Vitals taken. Checked into next appointment.      Port accessed with 20 gauge grippe needle by RN in lab.  Port flushed with saline and heparin.  Vital signs taken.  Pt checked in to next appointment.    Rosa Farris RN

## 2021-02-04 ENCOUNTER — OFFICE VISIT (OUTPATIENT)
Dept: RADIATION ONCOLOGY | Facility: CLINIC | Age: 61
End: 2021-02-04
Attending: RADIOLOGY
Payer: MEDICARE

## 2021-02-04 ENCOUNTER — INFUSION THERAPY VISIT (OUTPATIENT)
Dept: ONCOLOGY | Facility: CLINIC | Age: 61
End: 2021-02-04
Attending: INTERNAL MEDICINE
Payer: MEDICARE

## 2021-02-04 VITALS
HEART RATE: 87 BPM | SYSTOLIC BLOOD PRESSURE: 110 MMHG | DIASTOLIC BLOOD PRESSURE: 70 MMHG | TEMPERATURE: 98 F | RESPIRATION RATE: 14 BRPM | OXYGEN SATURATION: 97 %

## 2021-02-04 VITALS
WEIGHT: 315 LBS | SYSTOLIC BLOOD PRESSURE: 110 MMHG | HEART RATE: 87 BPM | BODY MASS INDEX: 39.98 KG/M2 | DIASTOLIC BLOOD PRESSURE: 70 MMHG

## 2021-02-04 DIAGNOSIS — C15.5 CANCER OF DISTAL THIRD OF ESOPHAGUS (H): Primary | ICD-10-CM

## 2021-02-04 DIAGNOSIS — D70.1 CHEMOTHERAPY-INDUCED NEUTROPENIA (H): ICD-10-CM

## 2021-02-04 DIAGNOSIS — T45.1X5A CHEMOTHERAPY-INDUCED NEUTROPENIA (H): ICD-10-CM

## 2021-02-04 PROCEDURE — 250N000011 HC RX IP 250 OP 636: Performed by: INTERNAL MEDICINE

## 2021-02-04 PROCEDURE — 77386 HC IMRT TREATMENT DELIVERY, COMPLEX: CPT | Performed by: RADIOLOGY

## 2021-02-04 PROCEDURE — 250N000013 HC RX MED GY IP 250 OP 250 PS 637: Mod: GY | Performed by: PHYSICIAN ASSISTANT

## 2021-02-04 PROCEDURE — 96367 TX/PROPH/DG ADDL SEQ IV INF: CPT

## 2021-02-04 PROCEDURE — 250N000009 HC RX 250: Performed by: INTERNAL MEDICINE

## 2021-02-04 PROCEDURE — 258N000003 HC RX IP 258 OP 636: Performed by: INTERNAL MEDICINE

## 2021-02-04 PROCEDURE — 96375 TX/PRO/DX INJ NEW DRUG ADDON: CPT

## 2021-02-04 PROCEDURE — 96413 CHEMO IV INFUSION 1 HR: CPT

## 2021-02-04 PROCEDURE — 96417 CHEMO IV INFUS EACH ADDL SEQ: CPT

## 2021-02-04 RX ORDER — HEPARIN SODIUM (PORCINE) LOCK FLUSH IV SOLN 100 UNIT/ML 100 UNIT/ML
5 SOLUTION INTRAVENOUS
Status: DISCONTINUED | OUTPATIENT
Start: 2021-02-04 | End: 2021-02-04 | Stop reason: HOSPADM

## 2021-02-04 RX ORDER — DIPHENHYDRAMINE HCL 25 MG
25 CAPSULE ORAL ONCE
Status: COMPLETED | OUTPATIENT
Start: 2021-02-04 | End: 2021-02-04

## 2021-02-04 RX ADMIN — PACLITAXEL 151 MG: 6 INJECTION, SOLUTION INTRAVENOUS at 08:46

## 2021-02-04 RX ADMIN — DEXAMETHASONE SODIUM PHOSPHATE: 10 INJECTION, SOLUTION INTRAMUSCULAR; INTRAVENOUS at 08:27

## 2021-02-04 RX ADMIN — DIPHENHYDRAMINE HYDROCHLORIDE 25 MG: 25 CAPSULE ORAL at 08:26

## 2021-02-04 RX ADMIN — Medication 5 ML: at 08:29

## 2021-02-04 RX ADMIN — FAMOTIDINE 20 MG: 10 INJECTION, SOLUTION INTRAVENOUS at 08:26

## 2021-02-04 RX ADMIN — SODIUM CHLORIDE 250 ML: 9 INJECTION, SOLUTION INTRAVENOUS at 08:26

## 2021-02-04 RX ADMIN — CARBOPLATIN 300 MG: 10 INJECTION, SOLUTION INTRAVENOUS at 09:57

## 2021-02-04 ASSESSMENT — PAIN SCALES - GENERAL: PAINLEVEL: NO PAIN (0)

## 2021-02-04 NOTE — PROGRESS NOTES
Infusion Nursing Note:  Reuben Padilla presents today for C1D8 Taxol-Carboplatin.    Patient seen by provider today: Yes: SERGIO Barker 2/3, no changes   present during visit today: Not Applicable.    Note: Pt saw provider 2/3, no changes overnight.    Intravenous Access:  Implanted Port.    Treatment Conditions:  Lab Results   Component Value Date    HGB 16.1 02/03/2021     Lab Results   Component Value Date    WBC 5.3 02/03/2021      Lab Results   Component Value Date    ANEU 4.3 02/03/2021     Lab Results   Component Value Date     02/03/2021      Lab Results   Component Value Date     02/03/2021                   Lab Results   Component Value Date    POTASSIUM 4.0 02/03/2021           Lab Results   Component Value Date    MAG 1.9 11/07/2018            Lab Results   Component Value Date    CR 1.08 02/03/2021                   Lab Results   Component Value Date    NIDHI 8.9 02/03/2021                Lab Results   Component Value Date    BILITOTAL 2.1 02/03/2021           Lab Results   Component Value Date    ALBUMIN 4.0 02/03/2021                    Lab Results   Component Value Date    ALT 34 02/03/2021           Lab Results   Component Value Date    AST 16 02/03/2021       Results reviewed, labs MET treatment parameters, ok to proceed with treatment.      Post Infusion Assessment:  Patient tolerated infusion without incident.  Blood return noted pre and post infusion.  Site patent and intact, free from redness, edema or discomfort.  No evidence of extravasations.  Access discontinued per protocol.       Discharge Plan:   Prescription refills given for Protinix.  Discharge instructions reviewed with: Patient.  Patient and/or family verbalized understanding of discharge instructions and all questions answered.  AVS to patient via [employer].  Patient will return 2/10 Nicole and 2/11 infusion.   Patient discharged in stable condition accompanied by: self.  Departure Mode:  Ambulatory.    Barbara Charles RN

## 2021-02-04 NOTE — PROGRESS NOTES
WEEKLY MANAGEMENT NOTE  Radiation Oncology          Patient Name: Reuben Padilla  MRN: [id number]     Chest-Abdomen  1200 cGy / 5000 cGy    6 / 25        DAILY DOSE:     200  cGy/ day,  5 times/week      DISEASE UNDER TREATMENT: Consolidation to primary after successful immunotherapy for metastatic esophageal cancer.    CHEMOTHERAPY: PACLITAXEL/ CARBOPLATIN weekly    SUBJECTIVE:    CTC V5.0 Toxicity Criteria  Fatigue: Grade 0: No toxicity  Skin: Grade 0: No toxicity  Comment:    PULMONARY:  Dyspnea: Grade 0: No toxicity    GI:  Nausea: Grade 0: No toxicity  Mucositis: Grade 0: No toxicity  Comment:        OBJECTIVE:/70   Pulse 87   Wt (!) 156.9 kg (346 lb)   BMI 39.98 kg/m    Wt Readings from Last 2 Encounters:   02/04/21 (!) 156.9 kg (346 lb)   02/03/21 (!) 157.2 kg (346 lb 9.6 oz)      CBC RESULTS:   Recent Labs   Lab Test 02/03/21  0749   WBC 5.3   RBC 5.32   HGB 16.1   HCT 46.3   MCV 87   MCH 30.3   MCHC 34.8   RDW 12.5   *     Recent Labs   Lab Test 02/03/21  0749 01/28/21  1049    138   POTASSIUM 4.0 3.9   CHLORIDE 101 106   CO2 27 26   ANIONGAP 8 5   * 110*   BUN 25 19   CR 1.08 0.85   NIDHI 8.9 9.0         IMPRESSION: The patient is tolerating the treatment.  The patient set up, dose, and cone beam CT images were reviewed.      PLAN: Continue radiotherapy    PAIN MANAGEMENT PLAN: The patient does not require pain management    FERNANDO Costa M.D.  Department of Radiation Oncology  [facility]

## 2021-02-04 NOTE — PATIENT INSTRUCTIONS
Contact Numbers  [facility]: [phone]    After Hours:  [phone]  Triage: [phone]    Please call the [facility] Triage line if you experience a temperature greater than or equal to 100.5, shaking chills, have uncontrolled nausea, vomiting and/or diarrhea, dizziness, shortness of breath, chest pain, bleeding, unexplained bruising, or if you have any other new/concerning symptoms, questions or concerns.     If it is after hours, weekends, or holidays, please call the main hospital  at  [phone] and ask to speak to the Oncology doctor on call.     If you are having any concerning symptoms or wish to speak to a provider before your next infusion visit, please call your care coordinator or triage to notify them so we can adequately serve you.     If you need a refill on a narcotic prescription or other medication, please call triage before your infusion appointment.

## 2021-02-04 NOTE — LETTER
2/4/2021         RE: Reuben Padilla  [address] [address]        Dear Colleague,    Thank you for referring your patient, Reuben Padilla, to the [facility] RADIATION ONCOLOGY. Please see a copy of my visit note below.    WEEKLY MANAGEMENT NOTE  Radiation Oncology          Patient Name: Reuben Padilla  MRN: [id number]     Chest-Abdomen  1200 cGy / 5000 cGy    6 / 25        DAILY DOSE:     200  cGy/ day,  5 times/week      DISEASE UNDER TREATMENT: Consolidation to primary after successful immunotherapy for metastatic esophageal cancer.    CHEMOTHERAPY: PACLITAXEL/ CARBOPLATIN weekly    SUBJECTIVE:    CTC V5.0 Toxicity Criteria  Fatigue: Grade 0: No toxicity  Skin: Grade 0: No toxicity  Comment:    PULMONARY:  Dyspnea: Grade 0: No toxicity    GI:  Nausea: Grade 0: No toxicity  Mucositis: Grade 0: No toxicity  Comment:        OBJECTIVE:/70   Pulse 87   Wt (!) 156.9 kg (346 lb)   BMI 39.98 kg/m    Wt Readings from Last 2 Encounters:   02/04/21 (!) 156.9 kg (346 lb)   02/03/21 (!) 157.2 kg (346 lb 9.6 oz)      CBC RESULTS:   Recent Labs   Lab Test 02/03/21  0749   WBC 5.3   RBC 5.32   HGB 16.1   HCT 46.3   MCV 87   MCH 30.3   MCHC 34.8   RDW 12.5   *     Recent Labs   Lab Test 02/03/21  0749 01/28/21  1049    138   POTASSIUM 4.0 3.9   CHLORIDE 101 106   CO2 27 26   ANIONGAP 8 5   * 110*   BUN 25 19   CR 1.08 0.85   NIDHI 8.9 9.0         IMPRESSION: The patient is tolerating the treatment.  The patient set up, dose, and cone beam CT images were reviewed.      PLAN: Continue radiotherapy    PAIN MANAGEMENT PLAN: The patient does not require pain management    FERNANDO Costa M.D.  Department of Radiation Oncology  [facility]

## 2021-02-05 ENCOUNTER — APPOINTMENT (OUTPATIENT)
Dept: RADIATION ONCOLOGY | Facility: CLINIC | Age: 61
End: 2021-02-05
Attending: RADIOLOGY
Payer: MEDICARE

## 2021-02-05 PROCEDURE — 77386 HC IMRT TREATMENT DELIVERY, COMPLEX: CPT | Performed by: RADIOLOGY

## 2021-02-05 PROCEDURE — 77014 PR CT GUIDE FOR PLACEMENT RADIATION THERAPY FIELDS: CPT | Mod: 26 | Performed by: RADIOLOGY

## 2021-02-08 ENCOUNTER — APPOINTMENT (OUTPATIENT)
Dept: RADIATION ONCOLOGY | Facility: CLINIC | Age: 61
End: 2021-02-08
Attending: RADIOLOGY
Payer: MEDICARE

## 2021-02-08 PROCEDURE — 77386 HC IMRT TREATMENT DELIVERY, COMPLEX: CPT | Performed by: RADIOLOGY

## 2021-02-08 PROCEDURE — 77014 PR CT GUIDE FOR PLACEMENT RADIATION THERAPY FIELDS: CPT | Mod: 26 | Performed by: RADIOLOGY

## 2021-02-09 ENCOUNTER — APPOINTMENT (OUTPATIENT)
Dept: RADIATION ONCOLOGY | Facility: CLINIC | Age: 61
End: 2021-02-09
Attending: RADIOLOGY
Payer: MEDICARE

## 2021-02-09 PROCEDURE — 77014 PR CT GUIDE FOR PLACEMENT RADIATION THERAPY FIELDS: CPT | Mod: 26 | Performed by: RADIOLOGY

## 2021-02-09 PROCEDURE — 77427 RADIATION TX MANAGEMENT X5: CPT | Performed by: RADIOLOGY

## 2021-02-09 PROCEDURE — 77336 RADIATION PHYSICS CONSULT: CPT | Performed by: RADIOLOGY

## 2021-02-09 PROCEDURE — 77386 HC IMRT TREATMENT DELIVERY, COMPLEX: CPT | Performed by: RADIOLOGY

## 2021-02-10 ENCOUNTER — ONCOLOGY VISIT (OUTPATIENT)
Dept: ONCOLOGY | Facility: CLINIC | Age: 61
End: 2021-02-10
Attending: PHYSICIAN ASSISTANT
Payer: MEDICARE

## 2021-02-10 ENCOUNTER — APPOINTMENT (OUTPATIENT)
Dept: RADIATION ONCOLOGY | Facility: CLINIC | Age: 61
End: 2021-02-10
Attending: RADIOLOGY
Payer: MEDICARE

## 2021-02-10 ENCOUNTER — APPOINTMENT (OUTPATIENT)
Dept: LAB | Facility: CLINIC | Age: 61
End: 2021-02-10
Attending: INTERNAL MEDICINE
Payer: MEDICARE

## 2021-02-10 VITALS
RESPIRATION RATE: 16 BRPM | TEMPERATURE: 97.3 F | DIASTOLIC BLOOD PRESSURE: 74 MMHG | HEIGHT: 78 IN | BODY MASS INDEX: 36.45 KG/M2 | WEIGHT: 315 LBS | HEART RATE: 105 BPM | OXYGEN SATURATION: 97 % | SYSTOLIC BLOOD PRESSURE: 109 MMHG

## 2021-02-10 DIAGNOSIS — M19.90 INFLAMMATORY ARTHRITIS: ICD-10-CM

## 2021-02-10 DIAGNOSIS — M25.50 MULTIPLE JOINT PAIN: ICD-10-CM

## 2021-02-10 DIAGNOSIS — E66.01 MORBID OBESITY (H): ICD-10-CM

## 2021-02-10 DIAGNOSIS — C16.9 METASTASIS FROM GASTRIC CANCER (H): ICD-10-CM

## 2021-02-10 DIAGNOSIS — C79.9 METASTASIS FROM GASTRIC CANCER (H): ICD-10-CM

## 2021-02-10 DIAGNOSIS — C15.5 CANCER OF DISTAL THIRD OF ESOPHAGUS (H): ICD-10-CM

## 2021-02-10 DIAGNOSIS — C78.7 MALIGNANT NEOPLASM METASTATIC TO LIVER (H): ICD-10-CM

## 2021-02-10 LAB
ALBUMIN SERPL-MCNC: 3.7 G/DL (ref 3.4–5)
ALP SERPL-CCNC: 68 U/L (ref 40–150)
ALT SERPL W P-5'-P-CCNC: 28 U/L (ref 0–70)
ANION GAP SERPL CALCULATED.3IONS-SCNC: 7 MMOL/L (ref 3–14)
AST SERPL W P-5'-P-CCNC: 13 U/L (ref 0–45)
BASOPHILS # BLD AUTO: 0 10E9/L (ref 0–0.2)
BASOPHILS NFR BLD AUTO: 0.4 %
BILIRUB SERPL-MCNC: 1.3 MG/DL (ref 0.2–1.3)
BUN SERPL-MCNC: 21 MG/DL (ref 7–30)
CALCIUM SERPL-MCNC: 8.7 MG/DL (ref 8.5–10.1)
CHLORIDE SERPL-SCNC: 102 MMOL/L (ref 94–109)
CO2 SERPL-SCNC: 29 MMOL/L (ref 20–32)
CREAT SERPL-MCNC: 0.97 MG/DL (ref 0.66–1.25)
DIFFERENTIAL METHOD BLD: ABNORMAL
EOSINOPHIL # BLD AUTO: 0 10E9/L (ref 0–0.7)
EOSINOPHIL NFR BLD AUTO: 0.8 %
ERYTHROCYTE [DISTWIDTH] IN BLOOD BY AUTOMATED COUNT: 12.5 % (ref 10–15)
GFR SERPL CREATININE-BSD FRML MDRD: 84 ML/MIN/{1.73_M2}
GLUCOSE SERPL-MCNC: 128 MG/DL (ref 70–99)
HCT VFR BLD AUTO: 39 % (ref 40–53)
HGB BLD-MCNC: 13.8 G/DL (ref 13.3–17.7)
IMM GRANULOCYTES # BLD: 0 10E9/L (ref 0–0.4)
IMM GRANULOCYTES NFR BLD: 0.8 %
LYMPHOCYTES # BLD AUTO: 0.3 10E9/L (ref 0.8–5.3)
LYMPHOCYTES NFR BLD AUTO: 14.4 %
MCH RBC QN AUTO: 30 PG (ref 26.5–33)
MCHC RBC AUTO-ENTMCNC: 35.4 G/DL (ref 31.5–36.5)
MCV RBC AUTO: 85 FL (ref 78–100)
MONOCYTES # BLD AUTO: 0.1 10E9/L (ref 0–1.3)
MONOCYTES NFR BLD AUTO: 5.1 %
NEUTROPHILS # BLD AUTO: 1.9 10E9/L (ref 1.6–8.3)
NEUTROPHILS NFR BLD AUTO: 78.5 %
NRBC # BLD AUTO: 0 10*3/UL
NRBC BLD AUTO-RTO: 0 /100
PLATELET # BLD AUTO: 138 10E9/L (ref 150–450)
POTASSIUM SERPL-SCNC: 3.4 MMOL/L (ref 3.4–5.3)
PROT SERPL-MCNC: 7 G/DL (ref 6.8–8.8)
RBC # BLD AUTO: 4.6 10E12/L (ref 4.4–5.9)
SODIUM SERPL-SCNC: 138 MMOL/L (ref 133–144)
TSH SERPL DL<=0.005 MIU/L-ACNC: 0.99 MU/L (ref 0.4–4)
WBC # BLD AUTO: 2.4 10E9/L (ref 4–11)

## 2021-02-10 PROCEDURE — 80053 COMPREHEN METABOLIC PANEL: CPT | Performed by: INTERNAL MEDICINE

## 2021-02-10 PROCEDURE — 77386 HC IMRT TREATMENT DELIVERY, COMPLEX: CPT | Performed by: RADIOLOGY

## 2021-02-10 PROCEDURE — 85025 COMPLETE CBC W/AUTO DIFF WBC: CPT | Performed by: INTERNAL MEDICINE

## 2021-02-10 PROCEDURE — G0463 HOSPITAL OUTPT CLINIC VISIT: HCPCS | Mod: 25

## 2021-02-10 PROCEDURE — 99214 OFFICE O/P EST MOD 30 MIN: CPT | Performed by: PHYSICIAN ASSISTANT

## 2021-02-10 PROCEDURE — 36592 COLLECT BLOOD FROM PICC: CPT

## 2021-02-10 PROCEDURE — 84443 ASSAY THYROID STIM HORMONE: CPT | Performed by: INTERNAL MEDICINE

## 2021-02-10 PROCEDURE — 250N000011 HC RX IP 250 OP 636: Performed by: PHYSICIAN ASSISTANT

## 2021-02-10 RX ORDER — HEPARIN SODIUM (PORCINE) LOCK FLUSH IV SOLN 100 UNIT/ML 100 UNIT/ML
5 SOLUTION INTRAVENOUS ONCE
Status: COMPLETED | OUTPATIENT
Start: 2021-02-10 | End: 2021-02-10

## 2021-02-10 RX ADMIN — Medication 5 ML: at 10:25

## 2021-02-10 ASSESSMENT — MIFFLIN-ST. JEOR: SCORE: 2515.85

## 2021-02-10 ASSESSMENT — PAIN SCALES - GENERAL: PAINLEVEL: NO PAIN (0)

## 2021-02-10 NOTE — PROGRESS NOTES
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 His tumor is positive for cytokeratin 20, negative for P63 and CK7, and HER2 is negative. He started on FOLFOX (5FU/oxaliplatin) on 5/15/2017. He had a delay in treatment from 9/5-10/2/17 due to work related injury.     He had an excellent response by imaging throughout the summer 2017.    He a mixed response by imaging in late fall 2017.    In January 2018, he was switched to taxol and cyramza - had slight progression and neuropathy and clinical trial became available.        In March 2018, he was started on the [facility] Match Clinical Trial with crizotinib.  (MET amplification) He remained on crizotinib from March - July 2018.     August 2018, he was switched back to taxol/cramza.  In October 2018, he was switched to Keytruda (PD1 overexpressor).              -April 2019, his infusion was held for three weeks, and he was treated with prednisone and Enbrel for grade 3 arthralgias. Keytruda was resumed              -May 2020 he started Actemra (5/14).  This was initially WEEKLY and then in August- moved to QOW (secondary to fatigue and concerns for low WBC).                -August 2020 moved to q6 week Keytruda (still at the 200 mg dose)              -November 2020 moved back to q3w Keytruda  CT 12/30/2020: progression of esophageal mass   1/14/21: PET showing growth of primary tumor only, discussion of radiation   1/28/21: started chemoradiation with weekly carbo/taxol and daily radiation    INTERVAL HISTORY:   Levi is two weeks into his chemoradiation.  Overall things went OK.  He is taking Zofran BID and had no ANALI.   Has been losing weight- still eating some solids (chicken breast,  "potato wedges, sand which) and just really good about washing them down.  Drinking water/pedialyte- about 1/2 gallon daily.  He followed my recommendations on his diuretic from his local ENT- they are treating him like Meniere's disease, he reassures me he is only taking one a day now.  His weight is stabilized off.  He has not had another severe episode of vertigo for weeks now.       He has had no issues with fevers or chills, no chest pain or shortness of breath, no nausea, vomiting, or constipation.  No rash.  Has been tired, staying at his brothers in [place].  Sleeping OK at night.  Trying to walk outside a little each day.  Stamina is stable.  He feels a little head-fuzzy, this last week was on Saturday when he returned home for the weekend, but no orthostasis or dehydration.       All his arthralgias (hands and chronic R knee) pain didn't respond to the Dex like week one.  After the first week, his neuropathy in his feet may be a little louder than prior, but this last week was stable.   Occ has some balance issues, unchanged, recently got new orthotics and new shoes.      He is on Coumadin now. His INR is being managed in [place].          ROS: 10 point ROS neg other than the symptoms noted above in the HPI.     PHYSICAL EXAMINATION  /74 (BP Location: Right arm, Patient Position: Sitting, Cuff Size: Adult Large)   Pulse 105   Temp 97.3  F (36.3  C) (Tympanic)   Resp 16   Ht 1.979 m (6' 5.91\")   Wt (!) 157.4 kg (347 lb)   SpO2 97%   BMI 40.19 kg/m    Wt Readings from Last 4 Encounters:   02/10/21 (!) 157.4 kg (347 lb)   02/04/21 (!) 156.9 kg (346 lb)   02/03/21 (!) 157.2 kg (346 lb 9.6 oz)   01/28/21 (!) 160 kg (352 lb 11.2 oz)       Vital signs were reviewed.   Patient alert and oriented x3.   PERRLA. EOMI. No scleral icterus noted. OP without thrush/sores.  Neck exam: No palpable cervical, supraclavicular or axillary nodes bilaterally.   Heart: RRR no murmurs noted.   Lungs: clear to " auscultation bilaterally.  No crackles or wheezing.   Abd: positive bowel sounds in all four quadrants. Obese.  No tenderness to palpation.  No hepatomegaly.   Extremities: No lower extremity edema but dark stasis dermatits  Neuro: grossly intact.   Mood and affect is stable.         Labs:      1/28/2021 10:49 2/3/2021 07:49 2/10/2021 10:25   WBC 4.9 5.3 2.4 (L)   Hemoglobin 17.2 16.1 13.8   Hematocrit 48.8 46.3 39.0 (L)   Platelet Count 119 (L) 125 (L) 138 (L)   RBC Count 5.63 5.32 4.60   MCV 87 87 85   MCH 30.6 30.3 30.0   MCHC 35.2 34.8 35.4   RDW 12.8 12.5 12.5   Diff Method Automated Method Automated Method Automated Method   % Neutrophils 71.8 80.5 78.5   % Lymphocytes 18.4 13.3 14.4   % Monocytes 8.0 3.6 5.1   % Eosinophils 1.0 0.9 0.8   % Basophils 0.6 0.6 0.4   % Immature Granulocytes 0.2 1.1 0.8   Nucleated RBCs 0 0 0   Absolute Neutrophil 3.5 4.3 1.9        2/10/2021 10:25   Sodium 138   Potassium 3.4   Chloride 102   Carbon Dioxide 29   Urea Nitrogen 21   Creatinine 0.97   GFR Estimate 84   GFR Estimate If Black >90   Calcium 8.7   Anion Gap 7   Albumin 3.7   Protein Total 7.0   Bilirubin Total 1.3   Alkaline Phosphatase 68   ALT 28   AST 13   TSH 0.99     IMPRESSION/PLAN:  1. Metastatic esophogeal adenocarcinoma. He has stable disease on Keytruda, for TWO years now, which is great news.  Given ongoing arthralgias and fatigue and stable disease, his Keytruda was dropped back to every 6 weeks, still at the 200 mg dose.  He saw Dr Dee in early November and his CT was mildly progressed at his GE junction tumor.  His dose was changed back to 200 mg every 3 weeks (verses 6 weeks). Levi noted worsening dysphagia, thus CT CAP was done on 12/30- showing ongoing primary tumor worsening with no evidence of metastatic disease.     PET showed only active disease at his primary site and given his dysphagia, Levi started concurrent chemoradiation with weekly carbo/taxol on 1/28.  We will have to watch his  "neuropathy very closely.  OK for week 3 tomorrow.  Continue weekly labs and follow up.     2. Immune related arthralgias- still thenar atrophy in his hands, but good strength.  No actual pain, just stiffness.  He is off all immune medications at this time.  The steroids helped his hands and now he feels they are stable. They look better to me.     3. H/o PE 2018.   He has progressed through Xarelto in the past (didn't take it regularly).  He was then on lovenox but his insurance changed and this became unaffordable. He is now on Coumadin and labs drawn  at [facility] in [place] fax ([phone]).  He is monitored by our coumadin clinic.     4. Baseline neuropathy is marginally worse in his legs with just numbness, none in his hands.  Continues on gabapentin BID, its primarily sensory in his feet only.  Has some balance issues on occasion, likely 2/2 to his weakness--this is stable.  Just saw podiatry and has orthotics made.      5. Weight loss: down about 20 pounds in a couple weeks.  Was given diuretic for possible Meniere's.  Levi admits he was taking \"2\" pills daily.  Is now on one pill daily, good hydration and food intake and weight is stable.      6. Possible appendicitis on last CT? Reviewed, improved and currently no symptoms    7. Episode of tinnitus and vertigo- Levi had an intense episode of vertigo last week when moving positions in his recliner, associated with diaphoresis and nausea.  Symptoms resolved over night, but then the next evening with the same position change in his chair, he had another episode.  Discussed positional vertigo verses Meniere's - seeing ENT and PT, taking the diuretic, no vertigo since     8. Elevated t.bili- this was likely acute dehydration last week, improved/stable      35 minutes spent on the date of the encounter doing chart review, review of test results, interpretation of tests, patient visit, documentation and discussion with other provider(s) "     Nicole Sutherland PA-c

## 2021-02-10 NOTE — NURSING NOTE
"Oncology Rooming Note    February 10, 2021 10:38 AM   Reuben Padilla is a 60 year old male who presents for:    Chief Complaint   Patient presents with     Port Draw     Labs drawn via port by rn in lab. VS taken.     Oncology Clinic Visit     [facility] RETURN - ESOPHAGEAL CANCER     Initial Vitals: /74 (BP Location: Right arm, Patient Position: Sitting, Cuff Size: Adult Large)   Pulse 105   Temp 97.3  F (36.3  C) (Tympanic)   Resp 16   Ht 1.979 m (6' 5.91\")   Wt (!) 157.4 kg (347 lb)   SpO2 97%   BMI 40.19 kg/m   Estimated body mass index is 40.19 kg/m  as calculated from the following:    Height as of this encounter: 1.979 m (6' 5.91\").    Weight as of this encounter: 157.4 kg (347 lb). Body surface area is 2.94 meters squared.  No Pain (0) Comment: Data Unavailable   No LMP for male patient.  Allergies reviewed: Yes  Medications reviewed: Yes    Medications: Medication refills not needed today.  Pharmacy name entered into EPIC:    [id number] - [place], [place] - [address] [facility] - [place], [place] - [address] [facility] - [facility] [address]  [facility] - [place], [place] - [address]    Clinical concerns: No new concerns. Ena was notified.      Haider Meraz LPN            "

## 2021-02-10 NOTE — NURSING NOTE
Chief Complaint   Patient presents with     Port Draw     Labs drawn via port by rn in lab. VS taken.     Port accessed with 20g 3/4 inch gripper needle by RN, labs collected, line flushed with saline and heparin.  Vitals taken. Pt checked in for appointment(s).    Jaron Andre RN

## 2021-02-11 ENCOUNTER — OFFICE VISIT (OUTPATIENT)
Dept: RADIATION ONCOLOGY | Facility: CLINIC | Age: 61
End: 2021-02-11
Attending: RADIOLOGY
Payer: MEDICARE

## 2021-02-11 ENCOUNTER — INFUSION THERAPY VISIT (OUTPATIENT)
Dept: ONCOLOGY | Facility: CLINIC | Age: 61
End: 2021-02-11
Attending: INTERNAL MEDICINE
Payer: MEDICARE

## 2021-02-11 VITALS
DIASTOLIC BLOOD PRESSURE: 70 MMHG | SYSTOLIC BLOOD PRESSURE: 101 MMHG | TEMPERATURE: 98.7 F | OXYGEN SATURATION: 96 % | RESPIRATION RATE: 16 BRPM | HEART RATE: 87 BPM

## 2021-02-11 VITALS — HEART RATE: 87 BPM | SYSTOLIC BLOOD PRESSURE: 101 MMHG | DIASTOLIC BLOOD PRESSURE: 70 MMHG

## 2021-02-11 DIAGNOSIS — C15.5 CANCER OF DISTAL THIRD OF ESOPHAGUS (H): Primary | ICD-10-CM

## 2021-02-11 DIAGNOSIS — D70.1 CHEMOTHERAPY-INDUCED NEUTROPENIA (H): ICD-10-CM

## 2021-02-11 DIAGNOSIS — T45.1X5A CHEMOTHERAPY-INDUCED NEUTROPENIA (H): ICD-10-CM

## 2021-02-11 PROCEDURE — 96417 CHEMO IV INFUS EACH ADDL SEQ: CPT

## 2021-02-11 PROCEDURE — 96367 TX/PROPH/DG ADDL SEQ IV INF: CPT

## 2021-02-11 PROCEDURE — 77386 HC IMRT TREATMENT DELIVERY, COMPLEX: CPT | Performed by: RADIOLOGY

## 2021-02-11 PROCEDURE — 250N000011 HC RX IP 250 OP 636: Performed by: INTERNAL MEDICINE

## 2021-02-11 PROCEDURE — 258N000003 HC RX IP 258 OP 636: Performed by: INTERNAL MEDICINE

## 2021-02-11 PROCEDURE — 96413 CHEMO IV INFUSION 1 HR: CPT

## 2021-02-11 PROCEDURE — 96375 TX/PRO/DX INJ NEW DRUG ADDON: CPT

## 2021-02-11 PROCEDURE — 250N000009 HC RX 250: Performed by: INTERNAL MEDICINE

## 2021-02-11 PROCEDURE — 250N000013 HC RX MED GY IP 250 OP 250 PS 637: Mod: GY | Performed by: INTERNAL MEDICINE

## 2021-02-11 RX ORDER — HEPARIN SODIUM (PORCINE) LOCK FLUSH IV SOLN 100 UNIT/ML 100 UNIT/ML
5 SOLUTION INTRAVENOUS
Status: DISCONTINUED | OUTPATIENT
Start: 2021-02-11 | End: 2021-02-11 | Stop reason: HOSPADM

## 2021-02-11 RX ORDER — DIPHENHYDRAMINE HCL 25 MG
25 CAPSULE ORAL ONCE
Status: CANCELLED
Start: 2021-02-18

## 2021-02-11 RX ORDER — DIPHENHYDRAMINE HCL 25 MG
25 CAPSULE ORAL ONCE
Status: COMPLETED | OUTPATIENT
Start: 2021-02-11 | End: 2021-02-11

## 2021-02-11 RX ORDER — DIPHENHYDRAMINE HCL 25 MG
25 CAPSULE ORAL ONCE
Status: CANCELLED
Start: 2021-02-11

## 2021-02-11 RX ADMIN — DEXAMETHASONE SODIUM PHOSPHATE: 10 INJECTION, SOLUTION INTRAMUSCULAR; INTRAVENOUS at 08:40

## 2021-02-11 RX ADMIN — DIPHENHYDRAMINE HYDROCHLORIDE 25 MG: 25 CAPSULE ORAL at 08:35

## 2021-02-11 RX ADMIN — PACLITAXEL 151 MG: 6 INJECTION, SOLUTION INTRAVENOUS at 09:03

## 2021-02-11 RX ADMIN — SODIUM CHLORIDE 250 ML: 9 INJECTION, SOLUTION INTRAVENOUS at 08:35

## 2021-02-11 RX ADMIN — FAMOTIDINE 20 MG: 10 INJECTION, SOLUTION INTRAVENOUS at 08:36

## 2021-02-11 RX ADMIN — CARBOPLATIN 300 MG: 10 INJECTION, SOLUTION INTRAVENOUS at 10:15

## 2021-02-11 RX ADMIN — Medication 5 ML: at 10:46

## 2021-02-11 ASSESSMENT — PAIN SCALES - GENERAL: PAINLEVEL: NO PAIN (0)

## 2021-02-11 NOTE — LETTER
2/11/2021      RE: Reuben Padilla  [address] [facility] [address]       WEEKLY MANAGEMENT NOTE  Radiation Oncology          Patient Name: Reuben Padilla  MRN: [id number]     Chest-Abdomen  2200 cGy / 5000 cGy    11 / 25        DAILY DOSE:     200  cGy/ day,  5 times/week      DISEASE UNDER TREATMENT: Consolidation to primary after successful immunotherapy for metastatic esophageal cancer.    CHEMOTHERAPY: PACLITAXEL/ CARBOPLATIN weekly    SUBJECTIVE:    CTC V5.0 Toxicity Criteria  Fatigue: Grade 0: No toxicity  Skin: Grade 0: No toxicity  Comment:    PULMONARY:  Dyspnea: Grade 0: No toxicity    GI:  Nausea: Grade 0: No toxicity  Mucositis: Grade 1: Asymptomatic or mild symptoms; intervention not indicated  Comment:        OBJECTIVE:  Wt Readings from Last 2 Encounters:   02/11/21 (!) 157.4 kg (347 lb)   02/04/21 (!) 156.9 kg (346 lb)      CBC RESULTS:   Recent Labs   Lab Test 02/10/21  1025   WBC 2.4*   RBC 4.60   HGB 13.8   HCT 39.0*   MCV 85   MCH 30.0   MCHC 35.4   RDW 12.5   *             IMPRESSION: The patient is tolerating the treatment.  The patient set up, dose, and cone beam CT images were reviewed.      PLAN: Continue radiotherapy    PAIN MANAGEMENT PLAN: The patient does not require pain management    FERNANDO Costa M.D.  Department of Radiation Oncology  [facility]

## 2021-02-11 NOTE — LETTER
2/11/2021         RE: Reuben Padilla  [address] [facility] [id number]        Dear Colleague,    Thank you for referring your patient, Reuben Padilla, to the [facility] RADIATION ONCOLOGY. Please see a copy of my visit note below.    WEEKLY MANAGEMENT NOTE  Radiation Oncology          Patient Name: Reuben Padilla  MRN: [id number]     Chest-Abdomen  2200 cGy / 5000 cGy    11 / 25        DAILY DOSE:     200  cGy/ day,  5 times/week      DISEASE UNDER TREATMENT: Consolidation to primary after successful immunotherapy for metastatic esophageal cancer.    CHEMOTHERAPY: PACLITAXEL/ CARBOPLATIN weekly    SUBJECTIVE:    CTC V5.0 Toxicity Criteria  Fatigue: Grade 0: No toxicity  Skin: Grade 0: No toxicity  Comment:    PULMONARY:  Dyspnea: Grade 0: No toxicity    GI:  Nausea: Grade 0: No toxicity  Mucositis: Grade 1: Asymptomatic or mild symptoms; intervention not indicated  Comment:        OBJECTIVE:  Wt Readings from Last 2 Encounters:   02/11/21 (!) 157.4 kg (347 lb)   02/04/21 (!) 156.9 kg (346 lb)      CBC RESULTS:   Recent Labs   Lab Test 02/10/21  1025   WBC 2.4*   RBC 4.60   HGB 13.8   HCT 39.0*   MCV 85   MCH 30.0   MCHC 35.4   RDW 12.5   *             IMPRESSION: The patient is tolerating the treatment.  The patient set up, dose, and cone beam CT images were reviewed.      PLAN: Continue radiotherapy    PAIN MANAGEMENT PLAN: The patient does not require pain management    FERNANDO Costa M.D.  Department of Radiation Oncology  [facility]      Again, thank you for allowing me to participate in the care of your patient.        Sincerely,        Dena Costa MD

## 2021-02-11 NOTE — PROGRESS NOTES
WEEKLY MANAGEMENT NOTE  Radiation Oncology          Patient Name: Reuben Padilla  MRN: [id number]     Chest-Abdomen  2200 cGy / 5000 cGy    11 / 25        DAILY DOSE:     200  cGy/ day,  5 times/week      DISEASE UNDER TREATMENT: Consolidation to primary after successful immunotherapy for metastatic esophageal cancer.    CHEMOTHERAPY: PACLITAXEL/ CARBOPLATIN weekly    SUBJECTIVE:    CTC V5.0 Toxicity Criteria  Fatigue: Grade 0: No toxicity  Skin: Grade 0: No toxicity  Comment:    PULMONARY:  Dyspnea: Grade 0: No toxicity    GI:  Nausea: Grade 0: No toxicity  Mucositis: Grade 1: Asymptomatic or mild symptoms; intervention not indicated  Comment:        OBJECTIVE:  Wt Readings from Last 2 Encounters:   02/11/21 (!) 157.4 kg (347 lb)   02/04/21 (!) 156.9 kg (346 lb)      CBC RESULTS:   Recent Labs   Lab Test 02/10/21  1025   WBC 2.4*   RBC 4.60   HGB 13.8   HCT 39.0*   MCV 85   MCH 30.0   MCHC 35.4   RDW 12.5   *             IMPRESSION: The patient is tolerating the treatment.  The patient set up, dose, and cone beam CT images were reviewed.      PLAN: Continue radiotherapy    PAIN MANAGEMENT PLAN: The patient does not require pain management    FERNANDO Costa M.D.  Department of Radiation Oncology  [facility]

## 2021-02-11 NOTE — PATIENT INSTRUCTIONS
[facility] Main Line: [phone]    Call triage nurse with chills and/or temperature greater than or equal to 100.4, uncontrolled nausea/vomiting, diarrhea, constipation, dizziness, shortness of breath, chest pain, bleeding, unexplained bruising, or any new/concerning symptoms, questions/concerns.   If you are having any concerning symptoms or wish to speak to a provider before your next infusion visit, please call your care coordinator or triage to notify them so we can adequately serve you.   Nurse Triage line:  [phone]    If after hours, weekends, or holidays, call main hospital  and ask for Oncology doctor on call @ [phone]      February 2021 Sunday Monday Tuesday Wednesday Thursday Friday Saturday        1    [facility] EXTERNAL RADIATION TREATMT  10:00 AM   (30 min.)   [facility] RAD ONC ELEKTA   [facility] Radiation Oncology 2    [facility] EXTERNAL RADIATION TREATMT  10:30 AM   (30 min.)   [facility] RAD ONC ELEKTA   [facility] Radiation Oncology 3    LAB CENTRAL   7:15 AM   (15 min.)    [facility] DRAW   [facility]    [facility] RETURN   7:35 AM   (50 min.)   Loraine Sutherland PA-C   [facility] EXTERNAL RADIATION TREATMT  10:30 AM   (30 min.)   [facility] RAD ONC ELEKTA   [facility] Radiation Oncology 4    [facility] ONC INFUSION 180   8:00 AM   (180 min.)    ONCOLOGY INFUSION   [facility] EXTERNAL RADIATION TREATMT  10:30 AM   (30 min.)   [facility] RAD ONC ELEKTA   [facility] Radiation Oncology    [facility] ON TREATMENT VISIT  11:00 AM   (15 min.)   Dena Costa MD   [facility] Radiation Oncology 5    P EXTERNAL RADIATION TREATMT   8:15 AM   (30 min.)   [facility] RAD ONC ELEKTA   [facility] Radiation Oncology 6       7     8    [facility] EXTERNAL RADIATION TREATMT   8:30 AM   (30 min.)   [facility] RAD ONC ELEKTA   [facility] Radiation Oncology  9    [facility] EXTERNAL RADIATION TREATMT   8:30 AM   (30 min.)   P RAD ONC ELEKTA   [facility] Radiation Oncology 10    LAB CENTRAL   9:45 AM   (15 min.)    [facility] DRAW   [facility]    [facility] RETURN  10:05 AM   (50 min.)   Loraine Sutherland PA-C   [facility]    [facility] EXTERNAL RADIATION TREATMT   1:00 PM   (30 min.)   P RAD ONC ELEKTA   [facility] Radiation Oncology 11    [facility] ONC INFUSION 180   8:00 AM   (180 min.)   [facility] ONCOLOGY INFUSION   [facility]    [facility] EXTERNAL RADIATION TREATMT  10:30 AM   (30 min.)   [facility] RAD ONC ELEKTA   [facility] Radiation Oncology    [facility] ON TREATMENT VISIT  11:00 AM   (15 min.)   Dena Costa MD   [facility] Radiation Oncology 12    [facility] EXTERNAL RADIATION TREATMT   8:30 AM   (30 min.)   P RAD ONC ELEKTA   [facility] Radiation Oncology 13       14     15    [facility] RETURN FOOT/ANKLE   7:45 AM   (20 min.)   Arnold Forbes DPM   [facility] [place]    [facility] EXTERNAL RADIATION TREATMT   8:30 AM   (30 min.)   P RAD ONC ELEKTA   [facility] Radiation Oncology 16    P EXTERNAL RADIATION TREATMT   8:30 AM   (30 min.)   [facility] RAD ONC ELEKTA   [facility] Radiation Oncology 17    LAB CENTRAL   8:00 AM   (15 min.)    [facility] DRAW   [facility]    [facility] RETURN   8:25 AM   (50 min.)   Loraine Sutherland PA-C   [facility]  10:30 AM   (30 min.)   P RAD ONC ELEKTA   [facility] Radiation Oncology    ELECTROCOCHLEOGRAPHY   1:00 PM   (90 min.)   Clari Alvarado AuD   [facility] [place] 18    [facility] ONC INFUSION 180   8:00 AM   (180 min.)   [facility] ONCOLOGY INFUSION   [facility]    [facility] EXTERNAL RADIATION TREATMT  10:30 AM   (30 min.)   P  RAD ONC ELEKTA   [facility] Radiation Oncology    [facility] ON TREATMENT VISIT  11:00 AM   (15 min.)   Dena Costa MD   [facility] Radiation Oncology 19    [facility] EXTERNAL RADIATION TREATMT   8:30 AM   (30 min.)   [facility] RAD ONC ELEKTA   [facility] Radiation Oncology 20       21     22    [facility] EXTERNAL RADIATION TREATMT   8:30 AM   (30 min.)   [facility] RAD ONC ELEKTA   [facility] Radiation Oncology 23    [facility] EXTERNAL RADIATION TREATMT   8:30 AM   (30 min.)   [facility] RAD ONC ELEKTA   [facility] Radiation Oncology 24    LAB CENTRAL   8:00 AM   (15 min.)    [facility]   8[facility] AM   (50 min.)   Loraine Sutherland PA-C   [facility] EXTERNAL RADIATION TREATMT  10:30 AM   (30 min.)   [facility] RAD ONC ELEKTA   [facility] Radiation Oncology 25    [facility] ONC INFUSION 180   8:00 AM   (180 min.)    ONCOLOGY INFUSION   [facility] EXTERNAL RADIATION TREATMT  10:30 AM   (30 min.)   [facility] RAD ONC ELEKTA   [facility] Radiation Oncology    UM ON TREATMENT VISIT  11:00 AM   (15 min.)   Dena Costa MD   [facility] Radiation Oncology 26    [facility] EXTERNAL RADIATION TREATMT   8:30 AM   (30 min.)   [facility] RAD ONC ELEKTA   [facility] Radiation Oncology 27       28 March 2021 Sunday Monday Tuesday Wednesday Thursday Friday Saturday        1    [facility] EXTERNAL RADIATION TREATMT   8:30 AM   (15 min.)   [facility] RAD ONC ELEKTA   [facility] Radiation Oncology 2    [facility] RETURN   7:45 AM   (30 min.)   Kadi Dee MD   [facility]   8:30 AM   (15 min.)   [facility] RAD ONC ELEKTA   [facility] Radiation Oncology 3    P EXTERNAL RADIATION TREATMT  10:30 AM   (15 min.)   [facility] RAD ONC ELEKTA     [facility] Radiation Oncology 4     5     6       7     8     9    BALANCE TESTING  10:15 AM   (90 min.)   Lisa Flores   [facility] [place] 10     11     12     13       14     15     16     17     18     19     20       21     22     23     24     25     26     27       28     29     30    WALK-IN FROM ENT   9:30 AM   (30 min.)   Rachel Wright AuD   [facility] NEW NEUROTOLOGY   9:45 AM   (30 min.)   Nissen, Rick L, MD   [facility] [place] 31                                    Lab Results:  No results found for this or any previous visit (from the past 12 hour(s)).

## 2021-02-11 NOTE — PROGRESS NOTES
Infusion Nursing Note:  Reuben Padilla presents today for C1D15 Taxol/Carboplatin.    Patient seen by provider today: No   present during visit today: Not Applicable.    Note: Patient reports no new concerns or complaints since talking with provider yesterday 2/10/21.    Intravenous Access:  Implanted Port.    Treatment Conditions:  Lab Results   Component Value Date    HGB 13.8 02/10/2021     Lab Results   Component Value Date    WBC 2.4 02/10/2021      Lab Results   Component Value Date    ANEU 1.9 02/10/2021     Lab Results   Component Value Date     02/10/2021      Lab Results   Component Value Date     02/10/2021                   Lab Results   Component Value Date    POTASSIUM 3.4 02/10/2021           Lab Results   Component Value Date    MAG 1.9 11/07/2018            Lab Results   Component Value Date    CR 0.97 02/10/2021                   Lab Results   Component Value Date    NIDHI 8.7 02/10/2021                Lab Results   Component Value Date    BILITOTAL 1.3 02/10/2021           Lab Results   Component Value Date    ALBUMIN 3.7 02/10/2021                    Lab Results   Component Value Date    ALT 28 02/10/2021           Lab Results   Component Value Date    AST 13 02/10/2021       Results reviewed, labs MET treatment parameters, ok to proceed with treatment.      Post Infusion Assessment:  Patient tolerated infusion without incident.  Blood return noted pre and post infusion.  Site patent and intact, free from redness, edema or discomfort.  No evidence of extravasations.  Access discontinued per protocol.       Discharge Plan:   Patient declined prescription refills.  Discharge instructions reviewed with: Patient.  Patient and/or family verbalized understanding of discharge instructions and all questions answered.  AVS to patient via [employer].  Patient will return 2/182021 for next appointment.   Patient discharged in stable condition accompanied by: self.  Departure Mode:  Ambulatory.  Face to Face time: 5 minutes.    Paolo Bonner RN

## 2021-02-12 ENCOUNTER — APPOINTMENT (OUTPATIENT)
Dept: RADIATION ONCOLOGY | Facility: CLINIC | Age: 61
End: 2021-02-12
Attending: RADIOLOGY
Payer: MEDICARE

## 2021-02-12 PROCEDURE — 77386 HC IMRT TREATMENT DELIVERY, COMPLEX: CPT | Performed by: RADIOLOGY

## 2021-02-12 PROCEDURE — 77014 PR CT GUIDE FOR PLACEMENT RADIATION THERAPY FIELDS: CPT | Mod: 26 | Performed by: RADIOLOGY

## 2021-02-15 ENCOUNTER — APPOINTMENT (OUTPATIENT)
Dept: RADIATION ONCOLOGY | Facility: CLINIC | Age: 61
End: 2021-02-15
Attending: RADIOLOGY
Payer: MEDICARE

## 2021-02-15 ENCOUNTER — INFUSION THERAPY VISIT (OUTPATIENT)
Dept: INFUSION THERAPY | Facility: CLINIC | Age: 61
End: 2021-02-15
Attending: STUDENT IN AN ORGANIZED HEALTH CARE EDUCATION/TRAINING PROGRAM
Payer: MEDICARE

## 2021-02-15 ENCOUNTER — OFFICE VISIT (OUTPATIENT)
Dept: ORTHOPEDICS | Facility: CLINIC | Age: 61
End: 2021-02-15
Payer: MEDICARE

## 2021-02-15 VITALS
HEART RATE: 84 BPM | SYSTOLIC BLOOD PRESSURE: 101 MMHG | DIASTOLIC BLOOD PRESSURE: 69 MMHG | OXYGEN SATURATION: 96 % | TEMPERATURE: 98 F | RESPIRATION RATE: 18 BRPM

## 2021-02-15 DIAGNOSIS — C15.5 CANCER OF DISTAL THIRD OF ESOPHAGUS (H): Primary | ICD-10-CM

## 2021-02-15 DIAGNOSIS — Z11.59 ENCOUNTER FOR SCREENING FOR OTHER VIRAL DISEASES: ICD-10-CM

## 2021-02-15 DIAGNOSIS — B35.3 TINEA PEDIS OF BOTH FEET: Primary | ICD-10-CM

## 2021-02-15 DIAGNOSIS — B35.1 OM (ONYCHOMYCOSIS): ICD-10-CM

## 2021-02-15 DIAGNOSIS — I73.89 OTHER SPECIFIED PERIPHERAL VASCULAR DISEASES (H): ICD-10-CM

## 2021-02-15 LAB
ANION GAP SERPL CALCULATED.3IONS-SCNC: 6 MMOL/L (ref 3–14)
BUN SERPL-MCNC: 22 MG/DL (ref 7–30)
CALCIUM SERPL-MCNC: 8.3 MG/DL (ref 8.5–10.1)
CHLORIDE SERPL-SCNC: 103 MMOL/L (ref 94–109)
CO2 SERPL-SCNC: 27 MMOL/L (ref 20–32)
CREAT SERPL-MCNC: 1.1 MG/DL (ref 0.66–1.25)
GFR SERPL CREATININE-BSD FRML MDRD: 72 ML/MIN/{1.73_M2}
GLUCOSE SERPL-MCNC: 116 MG/DL (ref 70–99)
LABORATORY COMMENT REPORT: NORMAL
POTASSIUM SERPL-SCNC: 3.5 MMOL/L (ref 3.4–5.3)
SARS-COV-2 RNA RESP QL NAA+PROBE: NEGATIVE
SODIUM SERPL-SCNC: 137 MMOL/L (ref 133–144)
SPECIMEN SOURCE: NORMAL

## 2021-02-15 PROCEDURE — U0003 INFECTIOUS AGENT DETECTION BY NUCLEIC ACID (DNA OR RNA); SEVERE ACUTE RESPIRATORY SYNDROME CORONAVIRUS 2 (SARS-COV-2) (CORONAVIRUS DISEASE [COVID-19]), AMPLIFIED PROBE TECHNIQUE, MAKING USE OF HIGH THROUGHPUT TECHNOLOGIES AS DESCRIBED BY CMS-2020-01-R: HCPCS | Performed by: STUDENT IN AN ORGANIZED HEALTH CARE EDUCATION/TRAINING PROGRAM

## 2021-02-15 PROCEDURE — 11721 DEBRIDE NAIL 6 OR MORE: CPT | Performed by: PODIATRIST

## 2021-02-15 PROCEDURE — 77386 HC IMRT TREATMENT DELIVERY, COMPLEX: CPT | Performed by: RADIOLOGY

## 2021-02-15 PROCEDURE — 99213 OFFICE O/P EST LOW 20 MIN: CPT | Mod: 25 | Performed by: PODIATRIST

## 2021-02-15 PROCEDURE — 80048 BASIC METABOLIC PNL TOTAL CA: CPT | Performed by: STUDENT IN AN ORGANIZED HEALTH CARE EDUCATION/TRAINING PROGRAM

## 2021-02-15 PROCEDURE — 96360 HYDRATION IV INFUSION INIT: CPT

## 2021-02-15 PROCEDURE — U0005 INFEC AGEN DETEC AMPLI PROBE: HCPCS | Performed by: STUDENT IN AN ORGANIZED HEALTH CARE EDUCATION/TRAINING PROGRAM

## 2021-02-15 PROCEDURE — 250N000011 HC RX IP 250 OP 636: Performed by: STUDENT IN AN ORGANIZED HEALTH CARE EDUCATION/TRAINING PROGRAM

## 2021-02-15 PROCEDURE — 258N000003 HC RX IP 258 OP 636: Performed by: STUDENT IN AN ORGANIZED HEALTH CARE EDUCATION/TRAINING PROGRAM

## 2021-02-15 RX ORDER — HEPARIN SODIUM (PORCINE) LOCK FLUSH IV SOLN 100 UNIT/ML 100 UNIT/ML
5 SOLUTION INTRAVENOUS
Status: DISCONTINUED | OUTPATIENT
Start: 2021-02-15 | End: 2021-02-15 | Stop reason: HOSPADM

## 2021-02-15 RX ORDER — SUCRALFATE ORAL 1 G/10ML
1 SUSPENSION ORAL 2 TIMES DAILY PRN
Qty: 100 ML | Refills: 1 | Status: SHIPPED | OUTPATIENT
Start: 2021-02-15 | End: 2021-08-12

## 2021-02-15 RX ORDER — HEPARIN SODIUM,PORCINE 10 UNIT/ML
5 VIAL (ML) INTRAVENOUS
Status: CANCELLED | OUTPATIENT
Start: 2021-02-15

## 2021-02-15 RX ORDER — HEPARIN SODIUM (PORCINE) LOCK FLUSH IV SOLN 100 UNIT/ML 100 UNIT/ML
5 SOLUTION INTRAVENOUS
Status: CANCELLED | OUTPATIENT
Start: 2021-02-15

## 2021-02-15 RX ORDER — CICLOPIROX OLAMINE 7.7 MG/G
CREAM TOPICAL 2 TIMES DAILY
Qty: 90 G | Refills: 1 | Status: SHIPPED | OUTPATIENT
Start: 2021-02-15 | End: 2022-05-23

## 2021-02-15 RX ORDER — SIMETHICONE 125 MG
125 TABLET,CHEWABLE ORAL 4 TIMES DAILY PRN
Qty: 60 TABLET | Refills: 0 | Status: SHIPPED | OUTPATIENT
Start: 2021-02-15 | End: 2021-06-07

## 2021-02-15 RX ADMIN — Medication 5 ML: at 11:14

## 2021-02-15 RX ADMIN — SODIUM CHLORIDE 1000 ML: 9 INJECTION, SOLUTION INTRAVENOUS at 10:07

## 2021-02-15 NOTE — PROGRESS NOTES
Chief Complaint   Patient presents with     Follow Up     9 week follow up.           HPI: Reuben is a 60 year old male who presents today for further evaluation for mycotic nails and orthotics. Relates that he did pick the orthotics up and these have been helpful in balance. Relates that he is not on radiation and chemotherapy. Nails are elongated and thick. He does have neuropathy.     Review of Systems: Answers for HPI/ROS submitted by the patient on 12/7/2020   General Symptoms: Yes  Skin Symptoms: Yes  HENT Symptoms: Yes  EYE SYMPTOMS: Yes  HEART SYMPTOMS: Yes  LUNG SYMPTOMS: No  INTESTINAL SYMPTOMS: Yes  URINARY SYMPTOMS: No  REPRODUCTIVE SYMPTOMS: No  SKELETAL SYMPTOMS: Yes  BLOOD SYMPTOMS: Yes  NERVOUS SYSTEM SYMPTOMS: Yes  MENTAL HEALTH SYMPTOMS: No  Fever: No  Loss of appetite: No  Weight loss: No  Weight gain: No  Fatigue: Yes  Night sweats: No  Chills: Yes  Increased stress: Yes  Excessive hunger: No  Excessive thirst: No  Feeling hot or cold when others believe the temperature is normal: Yes  Loss of height: No  Post-operative complications: No  Surgical site pain: No  Hallucinations: No  Change in or Loss of Energy: Yes  Hyperactivity: No  Confusion: No  Changes in hair: No  Changes in moles/birth marks: No  Itching: No  Rashes: No  Changes in nails: No  Acne: No  Change in facial hair: No  Warts: No  Non-healing sores: No  Scarring: Yes  Flaking of skin: Yes  Color changes of hands/feet in cold : Yes  Sun sensitivity: No  Skin thickening: No  Ear pain: No  Ear discharge: No  Hearing loss: Yes  Tinnitus: Yes  Nosebleeds: No  Congestion: No  Sinus pain: No  Trouble swallowing: Yes   Voice hoarseness: No  Mouth sores: No  Sore throat: No  Tooth pain: No  Gum tenderness: No  Bleeding gums: No  Change in taste: No  Change in sense of smell: No  Dry mouth: No  Hearing aid used: No  Neck lump: No  Eye pain: No  Vision loss: Yes  Dry eyes: Yes  Watery eyes: Yes  Eye bulging: No  Double vision: No  Flashing  of lights: No  Spots: Yes  Floaters: Yes  Redness: Yes  Crossed eyes: No  Tunnel Vision: No  Yellowing of eyes: No  Eye irritation: Yes  Chest pain or pressure: No  Fast or irregular heartbeat: Yes  Pain in legs with walking: Yes  Trouble breathing while lying down: No  Fingers or toes appear blue: No  High blood pressure: No  Low blood pressure: No  Fainting: No  Murmurs: No  Pacemaker: No  Varicose veins: Yes  Edema or swelling: No  Wake up at night with shortness of breath: No  Light-headedness: No  Exercise intolerance: Yes  Heart burn or indigestion: No  Nausea: No  Vomiting: No  Abdominal pain: No  Bloating: No  Constipation: No  Diarrhea: Yes  Blood in stool: No  Black stools: No  Rectal or Anal pain: No  Fecal incontinence: No  Yellowing of skin or eyes: No  Vomit with blood: No  Change in stools: No  Back pain: Yes  Muscle aches: Yes  Neck pain: Yes  Swollen joints: Yes  Joint pain: Yes  Bone pain: Yes  Muscle cramps: Yes  Muscle weakness: Yes  Joint stiffness: Yes  Bone fracture: No  Anemia: No  Swollen glands: No  Easy bleeding or bruising: Yes  Trouble with coordination: Yes  Dizziness or trouble with balance: Yes  Fainting or black-out spells: No  Memory loss: Yes  Headache: No  Seizures: No  Speech problems: No  Tingling: Yes  Tremor: No  Weakness: Yes  Difficulty walking: Yes  Paralysis: No  Numbness: Yes    PMH:   Past Medical History:   Diagnosis Date     Arrhythmia      Cancer (H)     esophageal     Glaucoma      Hypertension      Paroxysmal A-fib (H)      Tubular adenoma 02/2017       PSxH:   Past Surgical History:   Procedure Laterality Date     AMPUTATION      toe     ARTHROSCOPY KNEE       bunion surgery       CHOLECYSTECTOMY       COLONOSCOPY  02/2017    remove 2 polyps     ESOPHAGOSCOPY, GASTROSCOPY, DUODENOSCOPY (EGD), COMBINED N/A 10/10/2018    Procedure: COMBINED ESOPHAGOSCOPY, GASTROSCOPY, DUODENOSCOPY (EGD);  Esophagogastroduodenoscopy ;  Surgeon: Leonel Joel MD;  Location:   OR     INSERT PORT VASCULAR ACCESS Right 5/9/2017    Procedure: INSERT PORT VASCULAR ACCESS;  Single Lumen Chest Power Port;  Surgeon: Jasiel Hu PA-C;  [facility]       Allergies: Penicillin g    SH:   Social History     Socioeconomic History     Marital status: Single     Spouse name: Not on file     Number of children: Not on file     Years of education: Not on file     Highest education level: Not on file   Occupational History     Not on file   Social Needs     Financial resource strain: Not on file     Food insecurity     Worry: Not on file     Inability: Not on file     Transportation needs     Medical: Not on file     Non-medical: Not on file   Tobacco Use     Smoking status: Former Smoker     Packs/day: 1.00     Years: 20.00     Pack years: 20.00     Types: Cigarettes     Quit date: 1/1/2006     Years since quitting: 15.1     Smokeless tobacco: Never Used   Substance and Sexual Activity     Alcohol use: Yes     Comment: occasional     Drug use: No     Comment: h/o over 30 years ago     Sexual activity: Not on file   Lifestyle     Physical activity     Days per week: Not on file     Minutes per session: Not on file     Stress: Not on file   Relationships     Social connections     Talks on phone: Not on file     Gets together: Not on file     Attends Manda[employer] service: Not on file     Active member of club or organization: Not on file     Attends meetings of clubs or organizations: Not on file     Relationship status: Not on file     Intimate partner violence     Fear of current or ex partner: Not on file     Emotionally abused: Not on file     Physically abused: Not on file     Forced sexual activity: Not on file   Other Topics Concern     Parent/sibling w/ CABG, MI or angioplasty before 65F 55M? Not Asked   Social History Narrative     Not on file       FH: No family history on file.    Objective:  Data Unavailable Data Unavailable Data Unavailable Data Unavailable Data Unavailable 0  lbs 0 oz    PT and DP pulses are 2/4 bilaterally. CRT is instant. Diminished pedal hair. Varicosities noted BL.   Gross sensation is diminished bilaterally.  Protective sensation is diminished as tested with a 5.07G monofilament.  Equinus is noted bilaterally. No pain with active or passive ROM of the ankle, MTJ, 1st ray, or halluces bilaterally,.  No pain noted across the foot.  Right hallux amputation noted.  With ambulation he does have slight forefoot varus at the mid stance.  Nails mycotic bilaterally. Small amount of subungual fluid in the left hallux nail 2/2 pressure. Covered with abx ointment and a bandaid. No open lesions are noted. Vesicular rash on a red base on BL feet.     Assessment: Levi is a 60-year-old with chemotherapy-induced neuropathy and right hallux amputation.  He would like a pair of inserts molded for him today.  Onychomycosis  PVD  Tinea pedis.     Plan:  - Pt seen and evaluated.  - Cont with orthotics.   - Loprox topically for the tinea.   - Nails debrided x 10.   - See again in 9 - 10 weeks.

## 2021-02-15 NOTE — PROGRESS NOTES
"Nursing Note  Reuben Padilla presents [facility] for:   Chief Complaint   Patient presents with     Infusion     During today's [facility] appointment, orders from Dr. Jamison were completed.  Frequency: once    Progress note:  Patient identification verified by name and date of birth.  Assessment completed.  Vitals recorded in Doc Flowsheets.  Patient was provided with education regarding medication/procedure and possible side effects.  Patient verbalized understanding.    Patient was feeling lightheaded upon arrival. Has been having trouble with intake over the past couple days.      present during visit today: Not Applicable.    Treatment Conditions: Non-applicable.    Premedications: were not ordered.    Drug Waste Record: No    Infusion length and rate:  infusion given over approximately 60 minutes    Labs: were drawn per orders. After infusion completed.     Vascular access: port accessed today. Heparin locked and de-accessed per pt request.     Is the next appt scheduled? n/a  Asymptomatic COVID test completed? yes    Post Infusion Assessment:  Patient tolerated infusion without incident. He stated he felt \"pretty good\" after his infusion compared to prior.   Blood return noted pre and post infusion.  Site patent and intact, free from redness, edema or discomfort.  No evidence of extravasations.     Discharge Plan:   Follow up plan of care with: ongoing infusions at [facility].  Discharge instructions were reviewed with patient.  Patient/representative verbalized understanding of discharge instructions and all questions answered.  Patient discharged from [facility] in stable condition.    Zehra Vu RN       Administrations This Visit     0.9% sodium chloride BOLUS     Admin Date  02/15/2021 Action  New Bag Dose  1,000 mL Rate  1,000 mL/hr Route  Intravenous " Administered By  Zehra Vu RN          heparin 100 UNIT/ML injection 5 mL     Admin Date  02/15/2021 Action  Given Dose  5 mL Route  Intracatheter Administered By  Zehra Vu RN          sodium chloride (PF) 0.9% PF flush 3-20 mL     Admin Date  02/15/2021 Action  Given Dose  20 mL Route  Intracatheter Administered By  Zehra Vu RN                /84   Pulse 90   Temp 98  F (36.7  C) (Oral)   Resp 18   SpO2 96%

## 2021-02-15 NOTE — LETTER
"    2/15/2021         RE: Reuben Padilla  [address] [facility] [address]        Dear Colleague,    Thank you for referring your patient, Reuben Padilla, to the [facility] TREAT[facility]. Please see a copy of my visit note below.    Nursing Note  Reuben Padilla presents [facility] for:   Chief Complaint   Patient presents with     Infusion     During today's [facility] appointment, orders from Dr. Jamison were completed.  Frequency: once    Progress note:  Patient identification verified by name and date of birth.  Assessment completed.  Vitals recorded in Doc Flowsheets.  Patient was provided with education regarding medication/procedure and possible side effects.  Patient verbalized understanding.    Patient was feeling lightheaded upon arrival. Has been having trouble with intake over the past couple days.      present during visit today: Not Applicable.    Treatment Conditions: Non-applicable.    Premedications: were not ordered.    Drug Waste Record: No    Infusion length and rate:  infusion given over approximately 60 minutes    Labs: were drawn per orders. After infusion completed.     Vascular access: port accessed today. Heparin locked and de-accessed per pt request.     Is the next appt scheduled? n/a  Asymptomatic COVID test completed? yes    Post Infusion Assessment:  Patient tolerated infusion without incident. He stated he felt \"pretty good\" after his infusion compared to prior.   Blood return noted pre and post infusion.  Site patent and intact, free from redness, edema or discomfort.  No evidence of extravasations.     Discharge Plan:   Follow up plan of care with: ongoing infusions at [facility].  Discharge instructions were reviewed with patient.  Patient/representative verbalized understanding of discharge instructions and all questions " answered.  Patient discharged from [facility] in stable condition.    Zehra Vu RN       Administrations This Visit     0.9% sodium chloride BOLUS     Admin Date  02/15/2021 Action  New Bag Dose  1,000 mL Rate  1,000 mL/hr Route  Intravenous Administered By  Zehra Vu RN          heparin 100 UNIT/ML injection 5 mL     Admin Date  02/15/2021 Action  Given Dose  5 mL Route  Intracatheter Administered By  Zehra Vu RN          sodium chloride (PF) 0.9% PF flush 3-20 mL     Admin Date  02/15/2021 Action  Given Dose  20 mL Route  Intracatheter Administered By  Zehra Vu RN                /84   Pulse 90   Temp 98  F (36.7  C) (Oral)   Resp 18   SpO2 96%         Again, thank you for allowing me to participate in the care of your patient.        Sincerely,        [facility]

## 2021-02-15 NOTE — LETTER
2/15/2021         RE: Reuben Padilla  [address] [facility]note 2] [address]        Dear Colleague,    Thank you for referring your patient, Reuben Padilla, to the [facility] [place]. Please see a copy of my visit note below.    Chief Complaint   Patient presents with     Follow Up     9 week follow up.           HPI: Reuben is a 60 year old male who presents today for further evaluation for mycotic nails and orthotics. Relates that he did pick the orthotics up and these have been helpful in balance. Relates that he is not on radiation and chemotherapy. Nails are elongated and thick. He does have neuropathy.     Review of Systems: Answers for HPI/ROS submitted by the patient on 12/7/2020   General Symptoms: Yes  Skin Symptoms: Yes  HENT Symptoms: Yes  EYE SYMPTOMS: Yes  HEART SYMPTOMS: Yes  LUNG SYMPTOMS: No  INTESTINAL SYMPTOMS: Yes  URINARY SYMPTOMS: No  REPRODUCTIVE SYMPTOMS: No  SKELETAL SYMPTOMS: Yes  BLOOD SYMPTOMS: Yes  NERVOUS SYSTEM SYMPTOMS: Yes  MENTAL HEALTH SYMPTOMS: No  Fever: No  Loss of appetite: No  Weight loss: No  Weight gain: No  Fatigue: Yes  Night sweats: No  Chills: Yes  Increased stress: Yes  Excessive hunger: No  Excessive thirst: No  Feeling hot or cold when others believe the temperature is normal: Yes  Loss of height: No  Post-operative complications: No  Surgical site pain: No  Hallucinations: No  Change in or Loss of Energy: Yes  Hyperactivity: No  Confusion: No  Changes in hair: No  Changes in moles/birth marks: No  Itching: No  Rashes: No  Changes in nails: No  Acne: No  Change in facial hair: No  Warts: No  Non-healing sores: No  Scarring: Yes  Flaking of skin: Yes  Color changes of hands/feet in cold : Yes  Sun sensitivity: No  Skin thickening: No  Ear pain: No  Ear discharge: No  Hearing loss: Yes  Tinnitus: Yes  Nosebleeds: No  Congestion: No  Sinus pain: No  Trouble swallowing: Yes   Voice hoarseness: No  Mouth sores: No  Sore throat:  No  Tooth pain: No  Gum tenderness: No  Bleeding gums: No  Change in taste: No  Change in sense of smell: No  Dry mouth: No  Hearing aid used: No  Neck lump: No  Eye pain: No  Vision loss: Yes  Dry eyes: Yes  Watery eyes: Yes  Eye bulging: No  Double vision: No  Flashing of lights: No  Spots: Yes  Floaters: Yes  Redness: Yes  Crossed eyes: No  Tunnel Vision: No  Yellowing of eyes: No  Eye irritation: Yes  Chest pain or pressure: No  Fast or irregular heartbeat: Yes  Pain in legs with walking: Yes  Trouble breathing while lying down: No  Fingers or toes appear blue: No  High blood pressure: No  Low blood pressure: No  Fainting: No  Murmurs: No  Pacemaker: No  Varicose veins: Yes  Edema or swelling: No  Wake up at night with shortness of breath: No  Light-headedness: No  Exercise intolerance: Yes  Heart burn or indigestion: No  Nausea: No  Vomiting: No  Abdominal pain: No  Bloating: No  Constipation: No  Diarrhea: Yes  Blood in stool: No  Black stools: No  Rectal or Anal pain: No  Fecal incontinence: No  Yellowing of skin or eyes: No  Vomit with blood: No  Change in stools: No  Back pain: Yes  Muscle aches: Yes  Neck pain: Yes  Swollen joints: Yes  Joint pain: Yes  Bone pain: Yes  Muscle cramps: Yes  Muscle weakness: Yes  Joint stiffness: Yes  Bone fracture: No  Anemia: No  Swollen glands: No  Easy bleeding or bruising: Yes  Trouble with coordination: Yes  Dizziness or trouble with balance: Yes  Fainting or black-out spells: No  Memory loss: Yes  Headache: No  Seizures: No  Speech problems: No  Tingling: Yes  Tremor: No  Weakness: Yes  Difficulty walking: Yes  Paralysis: No  Numbness: Yes    PMH:   Past Medical History:   Diagnosis Date     Arrhythmia      Cancer (H)     esophageal     Glaucoma      Hypertension      Paroxysmal A-fib (H)      Tubular adenoma 02/2017       PSxH:   Past Surgical History:   Procedure Laterality Date     AMPUTATION      toe     ARTHROSCOPY KNEE       bunion surgery       CHOLECYSTECTOMY        COLONOSCOPY  02/2017    remove 2 polyps     ESOPHAGOSCOPY, GASTROSCOPY, DUODENOSCOPY (EGD), COMBINED N/A 10/10/2018    Procedure: COMBINED ESOPHAGOSCOPY, GASTROSCOPY, DUODENOSCOPY (EGD);  Esophagogastroduodenoscopy ;  Surgeon: Leonel Joel MD;  Location: [facility] OR     INSERT PORT VASCULAR ACCESS Right 5/9/2017    Procedure: INSERT PORT VASCULAR ACCESS;  Single Lumen Chest Power Port;  Surgeon: Jasiel Hu PA-C;  Location: [facility] OR       Allergies: Penicillin g    SH:   Social History     Socioeconomic History     Marital status: Single     Spouse name: Not on file     Number of children: Not on file     Years of education: Not on file     Highest education level: Not on file   Occupational History     Not on file   Social Needs     Financial resource strain: Not on file     Food insecurity     Worry: Not on file     Inability: Not on file     Transportation needs     Medical: Not on file     Non-medical: Not on file   Tobacco Use     Smoking status: Former Smoker     Packs/day: 1.00     Years: 20.00     Pack years: 20.00     Types: Cigarettes     Quit date: 1/1/2006     Years since quitting: 15.1     Smokeless tobacco: Never Used   Substance and Sexual Activity     Alcohol use: Yes     Comment: occasional     Drug use: No     Comment: h/o over 30 years ago     Sexual activity: Not on file   Lifestyle     Physical activity     Days per week: Not on file     Minutes per session: Not on file     Stress: Not on file   Relationships     Social connections     Talks on phone: Not on file     Gets together: Not on file     Attends Restorationist service: Not on file     Active member of club or organization: Not on file     Attends meetings of clubs or organizations: Not on file     Relationship status: Not on file     Intimate partner violence     Fear of current or ex partner: Not on file     Emotionally abused: Not on file     Physically abused: Not on file     Forced sexual activity: Not on file    Other Topics Concern     Parent/sibling w/ CABG, MI or angioplasty before 65F 55M? Not Asked   Social History Narrative     Not on file       FH: No family history on file.    Objective:  Data Unavailable Data Unavailable Data Unavailable Data Unavailable Data Unavailable 0 lbs 0 oz    PT and DP pulses are 2/4 bilaterally. CRT is instant. Diminished pedal hair. Varicosities noted BL.   Gross sensation is diminished bilaterally.  Protective sensation is diminished as tested with a 5.07G monofilament.  Equinus is noted bilaterally. No pain with active or passive ROM of the ankle, MTJ, 1st ray, or halluces bilaterally,.  No pain noted across the foot.  Right hallux amputation noted.  With ambulation he does have slight forefoot varus at the mid stance.  Nails mycotic bilaterally. Small amount of subungual fluid in the left hallux nail 2/2 pressure. Covered with abx ointment and a bandaid. No open lesions are noted. Vesicular rash on a red base on BL feet.     Assessment: Levi is a 60-year-old with chemotherapy-induced neuropathy and right hallux amputation.  He would like a pair of inserts molded for him today.  Onychomycosis  PVD  Tinea pedis.     Plan:  - Pt seen and evaluated.  - Cont with orthotics.   - Loprox topically for the tinea.   - Nails debrided x 10.   - See again in 9 - 10 weeks.      Arnold Forbes DPM

## 2021-02-15 NOTE — NURSING NOTE
Reason For Visit:   Chief Complaint   Patient presents with     Follow Up     9 week follow up.        Pain Assessment  Patient Currently in Pain: Denies        Allergies   Allergen Reactions     Oksana Márquez LPN

## 2021-02-15 NOTE — PROGRESS NOTES
Diagnosis: metastatic esophageal adenocarcinoma  Dose: 2600/5000  Fraction: 13/25    Mr. Pdailla is seen after completing his 13th fraction today. There was concern for some vasovagal event upon standing, but no annita LOC or syncopal episode. Felt sweaty and lightheaded. Mr. Padilla had a rough weekend with very poor PO intake. He was able to drive himself down to [place] from [place]; normally drives in the morning, but wasn't able to get out of recliner until around 4 pm due to fatigue. Only PO intake yesterday was at 6:30 pm.     Nausea/Vomiting: currently taking 8 BID of zofran scheduled, but now having dry heaves  Diarrhea: has been having loose stools over the weekend; yesterday 6 episodes  Falls: denies    Plan to have patient go to [facility] for 1L NS bolus and BMP. Patient was able to drink 2 glasses of water and 1 ginger ale. No nausea from it, having some heart burn and belching. I also gave him an Ensure to sip on during the bus over. I took him via wheelchair to the bus. I will also prescribe him simethicone and sucralfate. We will also now make the zofran every 8 hours given that he is now experiencing additional nausea despite the scheduled regimen.     The patient was seen by Dr. Jiménez.     I saw and assess the patient with the resident. He looked fatigued but stable in the clinic.  Agree with IV fluids support and close monitoring.    Maureen Jiménez MD

## 2021-02-16 ENCOUNTER — APPOINTMENT (OUTPATIENT)
Dept: RADIATION ONCOLOGY | Facility: CLINIC | Age: 61
End: 2021-02-16
Attending: RADIOLOGY
Payer: MEDICARE

## 2021-02-16 PROCEDURE — 77336 RADIATION PHYSICS CONSULT: CPT | Performed by: RADIOLOGY

## 2021-02-16 PROCEDURE — 77427 RADIATION TX MANAGEMENT X5: CPT | Performed by: RADIOLOGY

## 2021-02-16 PROCEDURE — 77014 PR CT GUIDE FOR PLACEMENT RADIATION THERAPY FIELDS: CPT | Mod: 26 | Performed by: RADIOLOGY

## 2021-02-16 PROCEDURE — 77386 HC IMRT TREATMENT DELIVERY, COMPLEX: CPT | Performed by: RADIOLOGY

## 2021-02-17 ENCOUNTER — APPOINTMENT (OUTPATIENT)
Dept: LAB | Facility: CLINIC | Age: 61
End: 2021-02-17
Attending: INTERNAL MEDICINE
Payer: MEDICARE

## 2021-02-17 ENCOUNTER — VIRTUAL VISIT (OUTPATIENT)
Dept: ONCOLOGY | Facility: CLINIC | Age: 61
End: 2021-02-17
Attending: PHYSICIAN ASSISTANT
Payer: MEDICARE

## 2021-02-17 ENCOUNTER — APPOINTMENT (OUTPATIENT)
Dept: RADIATION ONCOLOGY | Facility: CLINIC | Age: 61
End: 2021-02-17
Attending: RADIOLOGY
Payer: MEDICARE

## 2021-02-17 ENCOUNTER — OFFICE VISIT (OUTPATIENT)
Dept: AUDIOLOGY | Facility: CLINIC | Age: 61
End: 2021-02-17
Payer: MEDICARE

## 2021-02-17 VITALS
WEIGHT: 315 LBS | DIASTOLIC BLOOD PRESSURE: 81 MMHG | BODY MASS INDEX: 39.63 KG/M2 | HEART RATE: 81 BPM | RESPIRATION RATE: 18 BRPM | OXYGEN SATURATION: 99 % | TEMPERATURE: 97 F | SYSTOLIC BLOOD PRESSURE: 111 MMHG

## 2021-02-17 DIAGNOSIS — E66.01 MORBID OBESITY (H): ICD-10-CM

## 2021-02-17 DIAGNOSIS — C78.7 MALIGNANT NEOPLASM METASTATIC TO LIVER (H): ICD-10-CM

## 2021-02-17 DIAGNOSIS — C15.5 CANCER OF DISTAL THIRD OF ESOPHAGUS (H): ICD-10-CM

## 2021-02-17 DIAGNOSIS — C16.9 METASTASIS FROM GASTRIC CANCER (H): ICD-10-CM

## 2021-02-17 DIAGNOSIS — C79.9 METASTASIS FROM GASTRIC CANCER (H): ICD-10-CM

## 2021-02-17 DIAGNOSIS — I26.99 OTHER ACUTE PULMONARY EMBOLISM WITHOUT ACUTE COR PULMONALE (H): ICD-10-CM

## 2021-02-17 DIAGNOSIS — M19.90 INFLAMMATORY ARTHRITIS: ICD-10-CM

## 2021-02-17 DIAGNOSIS — Z86.711 HX OF PULMONARY EMBOLUS: ICD-10-CM

## 2021-02-17 DIAGNOSIS — R42 DIZZINESS AND GIDDINESS: Primary | ICD-10-CM

## 2021-02-17 DIAGNOSIS — M25.50 MULTIPLE JOINT PAIN: ICD-10-CM

## 2021-02-17 LAB
ALBUMIN SERPL-MCNC: 3.6 G/DL (ref 3.4–5)
ALP SERPL-CCNC: 63 U/L (ref 40–150)
ALT SERPL W P-5'-P-CCNC: 29 U/L (ref 0–70)
ANION GAP SERPL CALCULATED.3IONS-SCNC: 6 MMOL/L (ref 3–14)
AST SERPL W P-5'-P-CCNC: 17 U/L (ref 0–45)
BASOPHILS # BLD AUTO: 0 10E9/L (ref 0–0.2)
BASOPHILS NFR BLD AUTO: 0.5 %
BILIRUB SERPL-MCNC: 1.6 MG/DL (ref 0.2–1.3)
BUN SERPL-MCNC: 27 MG/DL (ref 7–30)
CALCIUM SERPL-MCNC: 8.7 MG/DL (ref 8.5–10.1)
CHLORIDE SERPL-SCNC: 104 MMOL/L (ref 94–109)
CO2 SERPL-SCNC: 29 MMOL/L (ref 20–32)
CREAT SERPL-MCNC: 1.16 MG/DL (ref 0.66–1.25)
DIFFERENTIAL METHOD BLD: ABNORMAL
EOSINOPHIL # BLD AUTO: 0 10E9/L (ref 0–0.7)
EOSINOPHIL NFR BLD AUTO: 0.5 %
ERYTHROCYTE [DISTWIDTH] IN BLOOD BY AUTOMATED COUNT: 12.8 % (ref 10–15)
GFR SERPL CREATININE-BSD FRML MDRD: 68 ML/MIN/{1.73_M2}
GLUCOSE SERPL-MCNC: 104 MG/DL (ref 70–99)
HCT VFR BLD AUTO: 34.7 % (ref 40–53)
HGB BLD-MCNC: 12.3 G/DL (ref 13.3–17.7)
IMM GRANULOCYTES # BLD: 0 10E9/L (ref 0–0.4)
IMM GRANULOCYTES NFR BLD: 0.5 %
LYMPHOCYTES # BLD AUTO: 0.2 10E9/L (ref 0.8–5.3)
LYMPHOCYTES NFR BLD AUTO: 13.2 %
MCH RBC QN AUTO: 30.6 PG (ref 26.5–33)
MCHC RBC AUTO-ENTMCNC: 35.4 G/DL (ref 31.5–36.5)
MCV RBC AUTO: 86 FL (ref 78–100)
MONOCYTES # BLD AUTO: 0.1 10E9/L (ref 0–1.3)
MONOCYTES NFR BLD AUTO: 7.1 %
NEUTROPHILS # BLD AUTO: 1.4 10E9/L (ref 1.6–8.3)
NEUTROPHILS NFR BLD AUTO: 78.2 %
NRBC # BLD AUTO: 0 10*3/UL
NRBC BLD AUTO-RTO: 0 /100
PLATELET # BLD AUTO: 111 10E9/L (ref 150–450)
POTASSIUM SERPL-SCNC: 3.4 MMOL/L (ref 3.4–5.3)
PROT SERPL-MCNC: 7 G/DL (ref 6.8–8.8)
RBC # BLD AUTO: 4.02 10E12/L (ref 4.4–5.9)
SODIUM SERPL-SCNC: 139 MMOL/L (ref 133–144)
TSH SERPL DL<=0.005 MIU/L-ACNC: 1.32 MU/L (ref 0.4–4)
WBC # BLD AUTO: 1.8 10E9/L (ref 4–11)

## 2021-02-17 PROCEDURE — 36591 DRAW BLOOD OFF VENOUS DEVICE: CPT

## 2021-02-17 PROCEDURE — 999N001193 HC VIDEO/TELEPHONE VISIT; NO CHARGE

## 2021-02-17 PROCEDURE — 80053 COMPREHEN METABOLIC PANEL: CPT | Mod: GT | Performed by: INTERNAL MEDICINE

## 2021-02-17 PROCEDURE — 250N000011 HC RX IP 250 OP 636: Mod: GT | Performed by: PHYSICIAN ASSISTANT

## 2021-02-17 PROCEDURE — 92584 ELECTROCOCHLEOGRAPHY: CPT | Performed by: AUDIOLOGIST

## 2021-02-17 PROCEDURE — 84443 ASSAY THYROID STIM HORMONE: CPT | Mod: GT | Performed by: INTERNAL MEDICINE

## 2021-02-17 PROCEDURE — 77386 HC IMRT TREATMENT DELIVERY, COMPLEX: CPT | Performed by: RADIOLOGY

## 2021-02-17 PROCEDURE — 92567 TYMPANOMETRY: CPT | Performed by: AUDIOLOGIST

## 2021-02-17 PROCEDURE — 85025 COMPLETE CBC W/AUTO DIFF WBC: CPT | Mod: GT | Performed by: INTERNAL MEDICINE

## 2021-02-17 PROCEDURE — 99443 PR PHYSICIAN TELEPHONE EVALUATION 21-30 MIN: CPT | Mod: 95 | Performed by: PHYSICIAN ASSISTANT

## 2021-02-17 RX ORDER — HEPARIN SODIUM (PORCINE) LOCK FLUSH IV SOLN 100 UNIT/ML 100 UNIT/ML
5 SOLUTION INTRAVENOUS ONCE
Status: COMPLETED | OUTPATIENT
Start: 2021-02-17 | End: 2021-02-17

## 2021-02-17 RX ADMIN — SODIUM CHLORIDE, PRESERVATIVE FREE 5 ML: 5 INJECTION INTRAVENOUS at 08:42

## 2021-02-17 ASSESSMENT — PAIN SCALES - GENERAL: PAINLEVEL: NO PAIN (0)

## 2021-02-17 NOTE — PROGRESS NOTES
Levi is a 60 year old who is being evaluated via a billable video visit.      How would you like to obtain your AVS? MyChart     If the video visit is dropped, the invitation should be resent by: Text to cell phone: [phone]     Will anyone else be joining your video visit? 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 His tumor is positive for cytokeratin 20, negative for P63 and CK7, and HER2 is negative. He started on FOLFOX (5FU/oxaliplatin) on 5/15/2017. He had a delay in treatment from 9/5-10/2/17 due to work related injury.     He had an excellent response by imaging throughout the summer 2017.    He a mixed response by imaging in late fall 2017.    In January 2018, he was switched to taxol and cyramza - had slight progression and neuropathy and clinical trial became available.        In March 2018, he was started on the NCI Match Clinical Trial with crizotinib.  (MET amplification) He remained on crizotinib from March - July 2018.     August 2018, he was switched back to taxol/cramza.  In October 2018, he was switched to Keytruda (PD1 overexpressor).              -April 2019, his infusion was held for three weeks, and he was treated with prednisone and Enbrel for grade 3 arthralgias. Keytruda was resumed              -May 2020 he started Actemra (5/14).  This was initially WEEKLY and then in August- moved to QOW (secondary to fatigue and concerns for low WBC).                -August 2020 moved to q6 week Keytruda (still at the 200 mg dose)              -November 2020 moved back to q3w Keytruda  CT 12/30/2020: progression of esophageal mass   1/14/21: PET showing growth of primary tumor  only, discussion of radiation   1/28/21: started chemoradiation with weekly carbo/taxol and daily radiation     INTERVAL HISTORY:   Levi is 3 weeks into his chemoradiation.  Overall things went OK.  Felt light headed on Monday, was orthostatic in XRT and had a 1 L of NS at SIPCand this felt better.     He is taking Zofran BID and had some mild nausea on Sat/Sun.  Was also really constipated and didn't have a BM these days, admitted to eating less, but thought he was hydrating OK. Has been losing weight- still eating some solids (had steak and potatoes last night, PB & J, etc) and just really good about washing them down. H Drinking water/pedialyte- about 1/2 gallon daily.      He has been taking daily diuretic from local ENT- they are treating him like Meniere's disease, he reassures me he is only taking one a day now.  He has not had another severe episode of vertigo for weeks now.      He has had no issues with fevers or chills, no chest pain or shortness of breath, no nausea, vomiting, or constipation.  No rash. Has been tired, staying at his brothers in [place].  Sleeping OK at night.   Stamina is stable.        All his arthralgias (hands and chronic R knee) seem better.  Whether this is from steroids or just being off immunotherapy- this is better. He has better  strength, less pain.  He is happy about this.  After the first week, his neuropathy in his feet may be a little louder than prior, but this last week was stable the last two weeks.   Rik has some balance issues, unchanged, recently got new orthotics and new shoes. Saw podiatry this last week.      He is on Coumadin now. His INR is being managed in [place].          ROS: 10 point ROS neg other than the symptoms noted above in the HPI.     PHYSICAL EXAMINATION  Vitals 2/17/2021   Systolic 111   Diastolic 81   Pulse 81   Respiration 18   Temp 97   O2 99   Pain    Weight 342 lb 3.2 oz     Wt Readings from Last 4 Encounters:   02/17/21 (!) 155.2  kg (342 lb 3.2 oz)   02/10/21 (!) 157.4 kg (347 lb)   02/04/21 (!) 156.9 kg (346 lb)   02/03/21 (!) 157.2 kg (346 lb 9.6 oz)       Voice is clear and strong. No audible stridor, wheezing, or respiratory distress. The remainder of PE was deferred due to PHE.        Labs:    2/3/2021 07:49 2/10/2021 10:25 2/17/2021 08:34   WBC 5.3 2.4 (L) 1.8 (L)   Hemoglobin 16.1 13.8 12.3 (L)   Hematocrit 46.3 39.0 (L) 34.7 (L)   Platelet Count 125 (L) 138 (L) 111 (L)   RBC Count 5.32 4.60 4.02 (L)   MCV 87 85 86   MCH 30.3 30.0 30.6   MCHC 34.8 35.4 35.4   RDW 12.5 12.5 12.8   Diff Method Automated Method Automated Method Automated Method   % Neutrophils 80.5 78.5 78.2   % Lymphocytes 13.3 14.4 13.2   % Monocytes 3.6 5.1 7.1   % Eosinophils 0.9 0.8 0.5   % Basophils 0.6 0.4 0.5   % Immature Granulocytes 1.1 0.8 0.5   Nucleated RBCs 0 0 0   Absolute Neutrophil 4.3 1.9 1.4 (L)         2/17/2021 08:34   Sodium 139   Potassium 3.4   Chloride 104   Carbon Dioxide 29   Urea Nitrogen 27   Creatinine 1.16   GFR Estimate 68   GFR Estimate If Black 79   Calcium 8.7   Anion Gap 6   Albumin 3.6   Protein Total 7.0   Bilirubin Total 1.6 (H)   Alkaline Phosphatase 63   ALT 29   AST 17   TSH 1.32       IMPRESSION/PLAN:  1. Metastatic esophogeal adenocarcinoma. He has stable disease on Keytruda, for TWO years now, which is great news.  Given ongoing arthralgias and fatigue and stable disease, his Keytruda was dropped back to every 6 weeks, still at the 200 mg dose.  He saw Dr Dee in early November and his CT was mildly progressed at his GE junction tumor.  His dose was changed back to 200 mg every 3 weeks (verses 6 weeks). Levi noted worsening dysphagia, thus CT CAP was done on 12/30- showing ongoing primary tumor worsening with no evidence of metastatic disease.      PET showed only active disease at his primary site and given his dysphagia, Levi started concurrent chemoradiation with weekly carbo/taxol on 1/28.  Tomorrow is WEEK 4.  Levi  and I discussed his ANC is 1.4.  Its been dropping with chemo and will likely drop further, next week is is last planned chemo- and I am suspecting his ANC will be too low to treat.  His neuropathy has been stable, his GI side effects are manageable and thankfully, he has mild esophagitis and still is not on narcotics and able to get in solid food.  He has some dehydration (see below) but we discussed going ahead with WEEK 4 of chemo tomorrow, knowing likely his counts will be too low next week.  He was OK with the plan.     -Dehydration- needed IVF on 2/15 after limited intake the Sunday before.  We are going to stop his diuretic from ENT.  We can restart this after he recovers from chemoradiation therapy.  I will give him IVF with infusion tomorrow and if he needs additional fluids during the week, we can help with this.   -mild post chemo constipation: he usually has diarrhea so I want to avoid anything strong.  Will use metamucil daily  -mild post chemo nausea: taking Zofran BID  -mild discomfort, esophagitis: still eating solids, will continue PPI, start carafate BID, not needing analgesics     2. Immune related arthralgias- still thenar atrophy in his hands, but good strength. And actually improving strength and coordination.  No pain and sig less stiffness.  The steroids helped his hands and he is pleased this is getting better.      3. H/o PE 2018.   He has progressed through Xarelto in the past (didn't take it regularly).  He was then on lovenox but his insurance changed and this became unaffordable. He is now on Coumadin and labs drawn  at [facility] in [place] fax ([phone]).  He is monitored by our coumadin clinic.  -watching platelets closely with therapy     4. Baseline neuropathy is marginally worse in his legs with just numbness, none in his hands.  Continues on gabapentin BID, its primarily sensory in his feet only.  Has some balance issues on occasion, likely 2/2 to his  weakness--this is stable.  Just saw podiatry and has orthotics made.      5. Weight loss: down about 20 pounds in a couple weeks.  Eating OK, will start daily Boost/Ensure. Extra hydration as mentioned above     6. Possible appendicitis on last CT? Reviewed, improved and currently no symptoms     7. Episode of tinnitus and vertigo- Levi had an intense episode of vertigo last week when moving positions in his recliner, associated with diaphoresis and nausea.  Symptoms resolved over night, but then the next evening with the same position change in his chair, he had another episode.  Discussed positional vertigo verses Meniere's - seeing ENT and PT, taking the diuretic, no vertigo since  -audiogram today  -stop diuretic till post chemo     8. Elevated t.bili- this was likely acute dehydration last week, improved/stable    Video-Visit Details    Video not working, converted to phone    5 minutes spent on the date of the encounter doing lab review, chart review and documentation, in addition to 32 minutes spent on the phone with the patient.     Nicole Sutherland PA-C

## 2021-02-17 NOTE — NURSING NOTE
Chief Complaint   Patient presents with     Video Visit     RETURN - ESOPHAGEAL CANCER     Port Draw     Labs drawn via port by RN in lab. VS taken     Port accessed with 20g 3/4 inch flat power needle by RN, labs collected, line flushed with saline and heparin.  Vitals taken.       Jeanne Samuel, RN

## 2021-02-17 NOTE — PROGRESS NOTES
"AUDIOLOGY REPORT    BACKGROUND INFORMATION: Reuben Padilla was seen in Audiology at the [facility] on 2/17/2021 for an electrocochleography (ECochG) evaluation, referred by Dr Rick Nissen. The patient has a diagnosis of cancer of distal third of esophagus/metastatic with liver metastases and widespread lavon metastasis with  diagnoses in 2017. The patient underwent chemotherapy treatment previously and is currently again undergoing chemotherapy treatment (1 treatment per week, fourth treatment tomorrow) and radiation treatment (5 days per week, 13 treatments completed of 23). The patient reports that he was diagnosed with Meneire's disease approximately 25 years ago but that he had been mostly asymptomatic until approximately 7-8 weeks ago when he reports he suffered a vertigo attack. The patient reports that he initially noted \"plugging\" of the right ear and decreased hearing in the right ear and that a few days later he was going to stand from his recliner (that uses a \"lift\" to bring him to standing position) and he was struck by vertigo and nausea that lasted 1-2 hours. The patient reports that he experienced a second episode of vertigo approximately 4-5 weeks ago again while getting up from his recliner with that episode lasting approximately 5 hours, accompanied by nausea and diarrhea. The patient reports that he cannot confirm if he used to have \"ear plugging\" and decreased hearing prior to his vertigo attacks years ago, but he does believe that the vertigo is more severe presently compared to in the past. After the second attack the patient was prescribed a diuretic which he is still currently taking, and also a medication that he is to take when he starts to feel the onset of vertigo but he reports he has not had to take this medication yet as he has not experienced another episode of dizziness since the last approximately one month ago.     The patient " "reports the presence of constant bilateral ringing tinnitus that fluctuates in severity present for the past couple of months and episodic right aural fullness that has been present since the onset of tinnitus. The patient reports that he was sent to an appointment with a Physical Therapist to help with tinnitus, but after one session his Physician discontinued the therapy. The patient reports that when the right-ear hearing is not fluctuating, the right ear hearing is still slightly poorer than the left ear hearing and he confirms that the left-ear hearing does not seem to fluctuate. There are no completed hearing evaluations that are available for review but the patient will have testing prior to meeting with ear, nose and throat. The patient also confirms that the right aural fullness and increase in severity of constant tinnitus do not seem to correlate with the onset of vertigo.     The patient reports a history of chronic ear infections in childhood and in to his mid-twenties that he suspects was due to a hobby of swimming and scuba-diving. The patient reports that the ear infections were treated with antibiotics and the patient confirms that he has never had PE tubes placed or undergone any ear surgeries previously. The patient reports a diagnosis of a fibrillation but denies any medications or treatment for this. The patient reports an allergy to penicillin. The patient reports that his brother seems to have developed hearing loss but that he does have a history of noise exposure. The patient also reports that his sister recently \"fell and hit the floor twice without warning\", causing a concussion; he is unsure if his sister experienced dizziness or vertigo prior to or during the falls. The patient reports that he does not consume caffeine and that he rarely drinks alcohol (approximately one drink every three weeks). He also reports that he does not add salt to any of his foods. The patient denies history of " head trauma, noise exposure, jaw issues, history of migraines, dizziness with loud sounds, autophony, or noises with eye blinks or eye movements. The patient is recently avoiding exertion and so he is unsure if this would cause dizziness.    TEST RESULTS AND PROCEDURES:   Abuse Screening:  Do you feel unsafe at home or work/school? No  Do you feel threatened by someone? No  Does anyone try to keep you from having contact with others, or doing things outside of your home? No  Physical signs of abuse present? No    Electrocochleography (ECochG) testing is performed using an auditory evoked potentials system.  Surface electrodes are placed behind the test ear with extratympanic tymptrode placed in the test ear in order to obtain a near-field recording that measures the response of the cochlear hair cells and auditory nerve in response to auditory stimuli. The measured response consists of the summating potential (SP) and the cochlear nerve action potential (AP). Abnormalities may take the form of an increased summating potential/compound action potential (SP/AP) ratio (due to an increased summating potential) in patients suspected of Meniere's disease (endolymphatic hydrops) or in those patients who have symptoms of vestibular dysfunction or ear symptoms including asymmetric or fluctuating hearing loss, tinnitus and/or aural fullness. The following can be suggestive of an abnormal EcochG: SP/AP ratio exceeds 0.43.  Tympanograms showed normal eardrum mobility bilaterally. Using a microscope tympanic membranes were visualized.       A two-channel ECochG recording was performed for clicks bilaterally.  Clicks for the right ear showed normal SP/AP ratios.    Clicks for the left ear showed normal SP/AP ratios.         Click SP/AP ratio Wave V latency   Right ear  .285 5.9 ms   Left ear  .144 6.07 ms*   *Denotes latencies outside of the normative range    Abrnormal SP/AP ratios must be greater than .43 for clicks.      SUMMARY AND RECOMMENDATIONS: Today s ECochG revealed normal SP/AP ratios. Left ear (reportably better hearing ear) wave V latency is outside of normative range. Please call this clinic with questions regarding today s results.  Follow-up with Dr Rick Nissen for medical management.    Deep Culver.  Licensed Audiologist  [place] #[id number]

## 2021-02-18 ENCOUNTER — OFFICE VISIT (OUTPATIENT)
Dept: RADIATION ONCOLOGY | Facility: CLINIC | Age: 61
End: 2021-02-18
Attending: RADIOLOGY
Payer: MEDICARE

## 2021-02-18 ENCOUNTER — INFUSION THERAPY VISIT (OUTPATIENT)
Dept: ONCOLOGY | Facility: CLINIC | Age: 61
End: 2021-02-18
Attending: INTERNAL MEDICINE
Payer: MEDICARE

## 2021-02-18 VITALS
OXYGEN SATURATION: 98 % | RESPIRATION RATE: 18 BRPM | DIASTOLIC BLOOD PRESSURE: 82 MMHG | TEMPERATURE: 98.1 F | HEART RATE: 105 BPM | SYSTOLIC BLOOD PRESSURE: 115 MMHG

## 2021-02-18 VITALS
SYSTOLIC BLOOD PRESSURE: 115 MMHG | HEART RATE: 105 BPM | DIASTOLIC BLOOD PRESSURE: 82 MMHG | BODY MASS INDEX: 39.61 KG/M2 | WEIGHT: 315 LBS

## 2021-02-18 DIAGNOSIS — C15.5 CANCER OF DISTAL THIRD OF ESOPHAGUS (H): Primary | ICD-10-CM

## 2021-02-18 DIAGNOSIS — D70.1 CHEMOTHERAPY-INDUCED NEUTROPENIA (H): ICD-10-CM

## 2021-02-18 DIAGNOSIS — T45.1X5A CHEMOTHERAPY-INDUCED NEUTROPENIA (H): ICD-10-CM

## 2021-02-18 PROCEDURE — 258N000003 HC RX IP 258 OP 636: Performed by: PHYSICIAN ASSISTANT

## 2021-02-18 PROCEDURE — 250N000011 HC RX IP 250 OP 636: Performed by: INTERNAL MEDICINE

## 2021-02-18 PROCEDURE — 258N000003 HC RX IP 258 OP 636: Performed by: INTERNAL MEDICINE

## 2021-02-18 PROCEDURE — 96417 CHEMO IV INFUS EACH ADDL SEQ: CPT

## 2021-02-18 PROCEDURE — 250N000013 HC RX MED GY IP 250 OP 250 PS 637: Mod: GY | Performed by: INTERNAL MEDICINE

## 2021-02-18 PROCEDURE — 250N000009 HC RX 250: Performed by: INTERNAL MEDICINE

## 2021-02-18 PROCEDURE — 77386 HC IMRT TREATMENT DELIVERY, COMPLEX: CPT | Performed by: RADIOLOGY

## 2021-02-18 PROCEDURE — 96413 CHEMO IV INFUSION 1 HR: CPT

## 2021-02-18 PROCEDURE — 96375 TX/PRO/DX INJ NEW DRUG ADDON: CPT

## 2021-02-18 RX ORDER — HEPARIN SODIUM (PORCINE) LOCK FLUSH IV SOLN 100 UNIT/ML 100 UNIT/ML
5 SOLUTION INTRAVENOUS
Status: DISCONTINUED | OUTPATIENT
Start: 2021-02-18 | End: 2021-02-18 | Stop reason: HOSPADM

## 2021-02-18 RX ORDER — DIPHENHYDRAMINE HCL 25 MG
25 CAPSULE ORAL ONCE
Status: COMPLETED | OUTPATIENT
Start: 2021-02-18 | End: 2021-02-18

## 2021-02-18 RX ADMIN — DIPHENHYDRAMINE HYDROCHLORIDE 25 MG: 25 CAPSULE ORAL at 08:43

## 2021-02-18 RX ADMIN — FAMOTIDINE 20 MG: 10 INJECTION INTRAVENOUS at 08:43

## 2021-02-18 RX ADMIN — CARBOPLATIN 300 MG: 10 INJECTION, SOLUTION INTRAVENOUS at 10:25

## 2021-02-18 RX ADMIN — SODIUM CHLORIDE 1000 ML: 9 INJECTION, SOLUTION INTRAVENOUS at 08:16

## 2021-02-18 RX ADMIN — PACLITAXEL 151 MG: 6 INJECTION, SOLUTION INTRAVENOUS at 09:13

## 2021-02-18 RX ADMIN — Medication 5 ML: at 11:00

## 2021-02-18 RX ADMIN — DEXAMETHASONE SODIUM PHOSPHATE: 10 INJECTION, SOLUTION INTRAMUSCULAR; INTRAVENOUS at 08:47

## 2021-02-18 ASSESSMENT — PAIN SCALES - GENERAL: PAINLEVEL: NO PAIN (0)

## 2021-02-18 NOTE — LETTER
2/18/2021         RE: Reuben Padilla  [address] [facility] [address]        Dear Colleague,    Thank you for referring your patient, Reuben Padilla, to the [facility] RADIATION ONCOLOGY. Please see a copy of my visit note below.    WEEKLY MANAGEMENT NOTE  Radiation Oncology          Patient Name: Reuben Padilla  MRN: [id number]     Chest-Abdomen  3200 cGy / 5000 cGy    16/ 25        DAILY DOSE:     200  cGy/ day,  5 times/week      DISEASE UNDER TREATMENT: Consolidation to primary after successful immunotherapy for metastatic esophageal cancer.    CHEMOTHERAPY: PACLITAXEL/ CARBOPLATIN weekly    SUBJECTIVE: The patient required fluids after near syncopal episode on 2/15/2021. He feels better today but still nauseated.     CTC V5.0 Toxicity Criteria  Fatigue: Grade 0: No toxicity  Skin: Grade 0: No toxicity  Comment:    PULMONARY:  Dyspnea: Grade 0: No toxicity    GI:  Nausea: Grade 2: Oral intake decreased without significant weight loss, dehydration or malnutrition  Mucositis: Grade 1: Asymptomatic or mild symptoms; intervention not indicated  Comment:        OBJECTIVE:  /82   Pulse 105   Wt (!) 155.1 kg (342 lb)   BMI 39.61 kg/m     Wt Readings from Last 2 Encounters:   02/18/21 (!) 155.1 kg (342 lb)   02/17/21 (!) 155.2 kg (342 lb 3.2 oz)       CBC RESULTS:   Recent Labs   Lab Test 02/17/21  0834   WBC 1.8*   RBC 4.02*   HGB 12.3*   HCT 34.7*   MCV 86   MCH 30.6   MCHC 35.4   RDW 12.8   *     Recent Labs   Lab Test 02/17/21  0834 02/15/21  1117    137   POTASSIUM 3.4 3.5   CHLORIDE 104 103   CO2 29 27   ANIONGAP 6 6   * 116*   BUN 27 22   CR 1.16 1.10   NIDHI 8.7 8.3*             IMPRESSION: The patient has significant toxicity.  The patient set up, dose, and cone beam CT images were reviewed.      PLAN: Continue radiotherapy    PAIN MANAGEMENT PLAN: The patient does not require pain management    FERNANDO Costa M.D.  Department of Radiation  Oncology  [facility]

## 2021-02-18 NOTE — PROGRESS NOTES
WEEKLY MANAGEMENT NOTE  Radiation Oncology          Patient Name: Reuben Padilla  MRN: [id number]     Chest-Abdomen  3200 cGy / 5000 cGy    16/ 25        DAILY DOSE:     200  cGy/ day,  5 times/week      DISEASE UNDER TREATMENT: Consolidation to primary after successful immunotherapy for metastatic esophageal cancer.    CHEMOTHERAPY: PACLITAXEL/ CARBOPLATIN weekly    SUBJECTIVE: The patient required fluids after near syncopal episode on 2/15/2021. He feels better today but still nauseated.     CTC V5.0 Toxicity Criteria  Fatigue: Grade 0: No toxicity  Skin: Grade 0: No toxicity  Comment:    PULMONARY:  Dyspnea: Grade 0: No toxicity    GI:  Nausea: Grade 2: Oral intake decreased without significant weight loss, dehydration or malnutrition  Mucositis: Grade 1: Asymptomatic or mild symptoms; intervention not indicated  Comment:        OBJECTIVE:  /82   Pulse 105   Wt (!) 155.1 kg (342 lb)   BMI 39.61 kg/m     Wt Readings from Last 2 Encounters:   02/18/21 (!) 155.1 kg (342 lb)   02/17/21 (!) 155.2 kg (342 lb 3.2 oz)       CBC RESULTS:   Recent Labs   Lab Test 02/17/21  0834   WBC 1.8*   RBC 4.02*   HGB 12.3*   HCT 34.7*   MCV 86   MCH 30.6   MCHC 35.4   RDW 12.8   *     Recent Labs   Lab Test 02/17/21  0834 02/15/21  1117    137   POTASSIUM 3.4 3.5   CHLORIDE 104 103   CO2 29 27   ANIONGAP 6 6   * 116*   BUN 27 22   CR 1.16 1.10   NIDHI 8.7 8.3*             IMPRESSION: The patient has significant toxicity.  The patient set up, dose, and cone beam CT images were reviewed.      PLAN: Continue radiotherapy    PAIN MANAGEMENT PLAN: The patient does not require pain management    FERNANDO Costa M.D.  Department of Radiation Oncology  [facility]

## 2021-02-18 NOTE — PROGRESS NOTES
Infusion Nursing Note:  Reuben Padilla presents today for C1 D22 Taxol/Carboplatin + 1L NS bolus.    Patient seen by provider today: No   present during visit today: Not Applicable.    Note: Patient arrives feeling well and pleased that he slept well last night. No changes overnight since visit with SERGIO Barker yesterday. Plan to receive extra fluids today due to dehydration/elevated creatinine per BERENICE note.    Patient did not meet criteria for an asymptomatic covid-19 PCR test in infusion today. Last negative test completed 2/15/21.    Intravenous Access:  Implanted Port. Intact from yesterday's lab visit.    Treatment Conditions:  Lab Results   Component Value Date    HGB 12.3 02/17/2021     Lab Results   Component Value Date    WBC 1.8 02/17/2021      Lab Results   Component Value Date    ANEU 1.4 02/17/2021     Lab Results   Component Value Date     02/17/2021      Lab Results   Component Value Date     02/17/2021                   Lab Results   Component Value Date    POTASSIUM 3.4 02/17/2021           Lab Results   Component Value Date    MAG 1.9 11/07/2018            Lab Results   Component Value Date    CR 1.16 02/17/2021                   Lab Results   Component Value Date    NIDHI 8.7 02/17/2021                Lab Results   Component Value Date    BILITOTAL 1.6 02/17/2021           Lab Results   Component Value Date    ALBUMIN 3.6 02/17/2021                    Lab Results   Component Value Date    ALT 29 02/17/2021           Lab Results   Component Value Date    AST 17 02/17/2021       Results reviewed, labs did NOT meet treatment parameters, but still ok to proceed with treatment. ANC < 1.5. SERGIO Rivera placed orders yesterday to proceed with Taxotere/Carboplatin with ANC 1.4.      Post Infusion Assessment:  Patient tolerated infusion without incident.  Blood return noted pre and post infusion.  Site patent and intact, free from redness, edema or discomfort.  No evidence of  extravasations.  Access discontinued per protocol.       Discharge Plan:   Patient declined prescription refills.  Discharge instructions reviewed with: Patient.  Patient and/or family verbalized understanding of discharge instructions and all questions answered.  AVS to patient via [employer]HART.  Patient will return 2/25 for next appointment.   Patient discharged in stable condition accompanied by: self to RT.  Departure Mode: Ambulatory.    Carley Robles RN

## 2021-02-19 ENCOUNTER — APPOINTMENT (OUTPATIENT)
Dept: RADIATION ONCOLOGY | Facility: CLINIC | Age: 61
End: 2021-02-19
Attending: RADIOLOGY
Payer: MEDICARE

## 2021-02-19 ENCOUNTER — PATIENT OUTREACH (OUTPATIENT)
Dept: ONCOLOGY | Facility: CLINIC | Age: 61
End: 2021-02-19

## 2021-02-19 PROCEDURE — 77386 HC IMRT TREATMENT DELIVERY, COMPLEX: CPT | Performed by: RADIOLOGY

## 2021-02-19 PROCEDURE — 77014 PR CT GUIDE FOR PLACEMENT RADIATION THERAPY FIELDS: CPT | Mod: 26 | Performed by: RADIOLOGY

## 2021-02-19 NOTE — PROGRESS NOTES
RN CARE COORDINATION NOTE      Incoming:  Spoke to Levi who would like to be scheduled for IVF next week. He has discussed this with Nicole and Dr Dee and would like to be proactive.     Message sent to scheduling              Mansi Wetzel MSN, RN, OCN  RN Care Coordinator  [facility]  [phone]

## 2021-02-22 ENCOUNTER — INFUSION THERAPY VISIT (OUTPATIENT)
Dept: INFUSION THERAPY | Facility: CLINIC | Age: 61
End: 2021-02-22
Attending: INTERNAL MEDICINE
Payer: MEDICARE

## 2021-02-22 ENCOUNTER — APPOINTMENT (OUTPATIENT)
Dept: RADIATION ONCOLOGY | Facility: CLINIC | Age: 61
End: 2021-02-22
Attending: RADIOLOGY
Payer: MEDICARE

## 2021-02-22 VITALS
TEMPERATURE: 98 F | RESPIRATION RATE: 18 BRPM | DIASTOLIC BLOOD PRESSURE: 77 MMHG | OXYGEN SATURATION: 98 % | HEART RATE: 89 BPM | SYSTOLIC BLOOD PRESSURE: 111 MMHG

## 2021-02-22 DIAGNOSIS — C15.5 CANCER OF DISTAL THIRD OF ESOPHAGUS (H): ICD-10-CM

## 2021-02-22 DIAGNOSIS — I95.1 ORTHOSTATIC HYPOTENSION: Primary | ICD-10-CM

## 2021-02-22 PROCEDURE — 250N000011 HC RX IP 250 OP 636: Performed by: INTERNAL MEDICINE

## 2021-02-22 PROCEDURE — 96360 HYDRATION IV INFUSION INIT: CPT

## 2021-02-22 PROCEDURE — 77386 HC IMRT TREATMENT DELIVERY, COMPLEX: CPT | Performed by: RADIOLOGY

## 2021-02-22 PROCEDURE — 77014 PR CT GUIDE FOR PLACEMENT RADIATION THERAPY FIELDS: CPT | Mod: 26 | Performed by: RADIOLOGY

## 2021-02-22 PROCEDURE — 258N000003 HC RX IP 258 OP 636: Performed by: PHYSICIAN ASSISTANT

## 2021-02-22 RX ORDER — HEPARIN SODIUM (PORCINE) LOCK FLUSH IV SOLN 100 UNIT/ML 100 UNIT/ML
500 SOLUTION INTRAVENOUS EVERY 8 HOURS
Status: COMPLETED | OUTPATIENT
Start: 2021-02-22 | End: 2021-02-22

## 2021-02-22 RX ADMIN — SODIUM CHLORIDE 1000 ML: 9 INJECTION, SOLUTION INTRAVENOUS at 09:54

## 2021-02-22 RX ADMIN — Medication 500 UNITS: at 11:01

## 2021-02-22 NOTE — LETTER
2/22/2021         RE: Reuben Padilla  [address] [facility] [place] [address]        Dear Colleague,    Thank you for referring your patient, Reuben Padilla, to the [facility]. Please see a copy of my visit note below.    Nursing Note  Reuben Pad[facility]:   Chief Complaint   Patient presents with     Infusion     IVF     During today's [facility] appointment, orders from Loraine Pretty were completed.  Frequency: as needed    Progress note:  Patient identification verified by name and date of birth.  Assessment completed.  Vitals recorded in Doc Flowsheets.  Patient was provided with education regarding medication/procedure and possible side effects.  Patient verbalized understanding.     present during visit today: Not Applicable.    Treatment Conditions: Non-applicable.    Premedications: were not ordered.    Drug Waste Record: No    Infusion length and rate:  infusion given over approximately 60 minutes    Labs: were not ordered for this appointment.    Vascular access: port accessed today. Heparin locked and de-accessed at end of infusion.    Is the next appt scheduled? Yes- sent to [facility] to see if fluid appointments could be moved closer to radiation appointments      Post Infusion Assessment:  Patient tolerated infusion without incident.  Blood return noted pre and post infusion.  Site patent and intact, free from redness, edema or discomfort.  No evidence of extravasations.  Access discontinued per protocol.     Discharge Plan:   Follow up plan of care with: ongoing infusions at [facility]. and ordering provider as scheduled.  Discharge instructions were reviewed with patient.  Patient/representative verbalized understanding of discharge instructions and all questions answered.  Patient discharged [facility] in stable condition.    Zehra Vu RN    Administrations This Visit     0.9% sodium chloride BOLUS     Admin Date  02/22/2021 Action  New Bag Dose  1,000 mL Rate  1,000 mL/hr Route  Intravenous Administered By  Zehra Vu RN                /84 (BP Location: Left arm)   Pulse 87   Temp 98  F (36.7  C) (Oral)   Resp 18   SpO2 98%         Again, thank you for allowing me to participate in the care of your patient.        Sincerely,        [facility]

## 2021-02-22 NOTE — PROGRESS NOTES
Nursing Note  Reuben Padilla presents [facility] for:   Chief Complaint   Patient presents with     Infusion     IVF     During [facility]'s [facility] appointment, orders from Loraine Pertty were completed.  Frequency: as needed    Progress note:  Patient identification verified by name and date of birth.  Assessment completed.  Vitals recorded in Doc Flowsheets.  Patient was provided with education regarding medication/procedure and possible side effects.  Patient verbalized understanding.     present during visit today: Not Applicable.    Treatment Conditions: Non-applicable.    Premedications: were not ordered.    Drug Waste Record: No    Infusion length and rate:  infusion given over approximately 60 minutes    Labs: were not ordered for this appointment.    Vascular access: port accessed today. Heparin locked and de-accessed at end of infusion.    Is the next appt scheduled? Yes- sent to [facility] to see if fluid appointments could be moved closer to radiation appointments      Post Infusion Assessment:  Patient tolerated infusion without incident.  Blood return noted pre and post infusion.  Site patent and intact, free from redness, edema or discomfort.  No evidence of extravasations.  Access discontinued per protocol.     Discharge Plan:   Follow up plan of care with: ongoing infusions at [facility]. and ordering provider as scheduled.  Discharge instructions were reviewed with patient.  Patient/representative verbalized understanding of discharge instructions and all questions answered.  Patient discharged from [facility] in stable condition.    Zehra Vu RN    Administrations This Visit     0.9% sodium chloride BOLUS     Admin Date  02/22/2021 Action  New Bag Dose  1,000 mL Rate  1,000 mL/hr Route  Intravenous Administered By  Zehra Vu RN                /84  (BP Location: Left arm)   Pulse 87   Temp 98  F (36.7  C) (Oral)   Resp 18   SpO2 98%

## 2021-02-22 NOTE — PATIENT INSTRUCTIONS
Dear Reuben Padilla    Thank you for choosing [facility] ([facility]) for your infusion.  The following information is a summary of our appointment as well as important reminders.      We look forward in seeing you on your next appointment here at [facility] ([facility]).  Please don t hesitate to call us at [phone] to reschedule any of your appointments or to speak with one of the [facility] registered nurses.  It was a pleasure taking care of you today.    Sincerely,    [facility]  [facility]  [address] [place] [address]  [address]  Phone:  [phone]

## 2021-02-23 ENCOUNTER — APPOINTMENT (OUTPATIENT)
Dept: RADIATION ONCOLOGY | Facility: CLINIC | Age: 61
End: 2021-02-23
Attending: RADIOLOGY
Payer: MEDICARE

## 2021-02-23 PROCEDURE — 77386 HC IMRT TREATMENT DELIVERY, COMPLEX: CPT | Performed by: RADIOLOGY

## 2021-02-23 PROCEDURE — 77336 RADIATION PHYSICS CONSULT: CPT | Performed by: RADIOLOGY

## 2021-02-23 PROCEDURE — 77427 RADIATION TX MANAGEMENT X5: CPT | Performed by: RADIOLOGY

## 2021-02-23 PROCEDURE — 77014 PR CT GUIDE FOR PLACEMENT RADIATION THERAPY FIELDS: CPT | Mod: 26 | Performed by: RADIOLOGY

## 2021-02-24 ENCOUNTER — INFUSION THERAPY VISIT (OUTPATIENT)
Dept: ONCOLOGY | Facility: CLINIC | Age: 61
End: 2021-02-24
Attending: INTERNAL MEDICINE
Payer: MEDICARE

## 2021-02-24 ENCOUNTER — APPOINTMENT (OUTPATIENT)
Dept: RADIATION ONCOLOGY | Facility: CLINIC | Age: 61
End: 2021-02-24
Attending: RADIOLOGY
Payer: MEDICARE

## 2021-02-24 ENCOUNTER — ONCOLOGY VISIT (OUTPATIENT)
Dept: ONCOLOGY | Facility: CLINIC | Age: 61
End: 2021-02-24
Attending: PHYSICIAN ASSISTANT
Payer: MEDICARE

## 2021-02-24 VITALS
TEMPERATURE: 97.3 F | DIASTOLIC BLOOD PRESSURE: 80 MMHG | RESPIRATION RATE: 16 BRPM | WEIGHT: 315 LBS | HEART RATE: 107 BPM | BODY MASS INDEX: 40.54 KG/M2 | OXYGEN SATURATION: 98 % | SYSTOLIC BLOOD PRESSURE: 114 MMHG

## 2021-02-24 DIAGNOSIS — C78.7 MALIGNANT NEOPLASM METASTATIC TO LIVER (H): ICD-10-CM

## 2021-02-24 DIAGNOSIS — C15.5 CANCER OF DISTAL THIRD OF ESOPHAGUS (H): Primary | ICD-10-CM

## 2021-02-24 DIAGNOSIS — I95.1 ORTHOSTATIC HYPOTENSION: ICD-10-CM

## 2021-02-24 DIAGNOSIS — C15.5 CANCER OF DISTAL THIRD OF ESOPHAGUS (H): ICD-10-CM

## 2021-02-24 DIAGNOSIS — T45.1X5A CHEMOTHERAPY-INDUCED NEUTROPENIA (H): ICD-10-CM

## 2021-02-24 DIAGNOSIS — C78.7 MALIGNANT NEOPLASM METASTATIC TO LIVER (H): Primary | ICD-10-CM

## 2021-02-24 DIAGNOSIS — D70.1 CHEMOTHERAPY-INDUCED NEUTROPENIA (H): ICD-10-CM

## 2021-02-24 LAB
ALBUMIN SERPL-MCNC: 3.1 G/DL (ref 3.4–5)
ALP SERPL-CCNC: 53 U/L (ref 40–150)
ALT SERPL W P-5'-P-CCNC: 18 U/L (ref 0–70)
ANION GAP SERPL CALCULATED.3IONS-SCNC: 8 MMOL/L (ref 3–14)
AST SERPL W P-5'-P-CCNC: 9 U/L (ref 0–45)
BASOPHILS # BLD AUTO: 0 10E9/L (ref 0–0.2)
BASOPHILS NFR BLD AUTO: 0 %
BILIRUB SERPL-MCNC: 1.3 MG/DL (ref 0.2–1.3)
BUN SERPL-MCNC: 14 MG/DL (ref 7–30)
CALCIUM SERPL-MCNC: 8 MG/DL (ref 8.5–10.1)
CHLORIDE SERPL-SCNC: 110 MMOL/L (ref 94–109)
CO2 SERPL-SCNC: 25 MMOL/L (ref 20–32)
CREAT SERPL-MCNC: 0.95 MG/DL (ref 0.66–1.25)
DIFFERENTIAL METHOD BLD: ABNORMAL
EOSINOPHIL # BLD AUTO: 0 10E9/L (ref 0–0.7)
EOSINOPHIL NFR BLD AUTO: 0 %
ERYTHROCYTE [DISTWIDTH] IN BLOOD BY AUTOMATED COUNT: 14 % (ref 10–15)
GFR SERPL CREATININE-BSD FRML MDRD: 86 ML/MIN/{1.73_M2}
GLUCOSE SERPL-MCNC: 106 MG/DL (ref 70–99)
HCT VFR BLD AUTO: 28.3 % (ref 40–53)
HGB BLD-MCNC: 9.7 G/DL (ref 13.3–17.7)
IMM GRANULOCYTES # BLD: 0 10E9/L (ref 0–0.4)
IMM GRANULOCYTES NFR BLD: 0.9 %
LYMPHOCYTES # BLD AUTO: 0.2 10E9/L (ref 0.8–5.3)
LYMPHOCYTES NFR BLD AUTO: 13 %
MCH RBC QN AUTO: 30.8 PG (ref 26.5–33)
MCHC RBC AUTO-ENTMCNC: 34.3 G/DL (ref 31.5–36.5)
MCV RBC AUTO: 90 FL (ref 78–100)
MONOCYTES # BLD AUTO: 0.1 10E9/L (ref 0–1.3)
MONOCYTES NFR BLD AUTO: 5.2 %
NEUTROPHILS # BLD AUTO: 0.9 10E9/L (ref 1.6–8.3)
NEUTROPHILS NFR BLD AUTO: 80.9 %
NRBC # BLD AUTO: 0 10*3/UL
NRBC BLD AUTO-RTO: 0 /100
PLATELET # BLD AUTO: 78 10E9/L (ref 150–450)
POTASSIUM SERPL-SCNC: 3.9 MMOL/L (ref 3.4–5.3)
PROT SERPL-MCNC: 6.2 G/DL (ref 6.8–8.8)
RBC # BLD AUTO: 3.15 10E12/L (ref 4.4–5.9)
SODIUM SERPL-SCNC: 143 MMOL/L (ref 133–144)
WBC # BLD AUTO: 1.2 10E9/L (ref 4–11)

## 2021-02-24 PROCEDURE — 77386 HC IMRT TREATMENT DELIVERY, COMPLEX: CPT | Performed by: RADIOLOGY

## 2021-02-24 PROCEDURE — 85025 COMPLETE CBC W/AUTO DIFF WBC: CPT | Performed by: INTERNAL MEDICINE

## 2021-02-24 PROCEDURE — G0463 HOSPITAL OUTPT CLINIC VISIT: HCPCS

## 2021-02-24 PROCEDURE — 99214 OFFICE O/P EST MOD 30 MIN: CPT | Performed by: PHYSICIAN ASSISTANT

## 2021-02-24 PROCEDURE — 80053 COMPREHEN METABOLIC PANEL: CPT | Performed by: INTERNAL MEDICINE

## 2021-02-24 PROCEDURE — 96360 HYDRATION IV INFUSION INIT: CPT

## 2021-02-24 PROCEDURE — 250N000011 HC RX IP 250 OP 636: Performed by: PHYSICIAN ASSISTANT

## 2021-02-24 PROCEDURE — G0463 HOSPITAL OUTPT CLINIC VISIT: HCPCS | Mod: 25

## 2021-02-24 PROCEDURE — 258N000003 HC RX IP 258 OP 636: Performed by: PHYSICIAN ASSISTANT

## 2021-02-24 PROCEDURE — 77014 PR CT GUIDE FOR PLACEMENT RADIATION THERAPY FIELDS: CPT | Mod: 26 | Performed by: RADIOLOGY

## 2021-02-24 RX ORDER — HEPARIN SODIUM (PORCINE) LOCK FLUSH IV SOLN 100 UNIT/ML 100 UNIT/ML
5 SOLUTION INTRAVENOUS
Status: CANCELLED | OUTPATIENT
Start: 2021-02-24

## 2021-02-24 RX ORDER — HEPARIN SODIUM,PORCINE 10 UNIT/ML
5 VIAL (ML) INTRAVENOUS
Status: CANCELLED | OUTPATIENT
Start: 2021-02-24

## 2021-02-24 RX ORDER — HEPARIN SODIUM,PORCINE 10 UNIT/ML
5 VIAL (ML) INTRAVENOUS
Status: DISCONTINUED | OUTPATIENT
Start: 2021-02-24 | End: 2021-02-24 | Stop reason: HOSPADM

## 2021-02-24 RX ORDER — HEPARIN SODIUM (PORCINE) LOCK FLUSH IV SOLN 100 UNIT/ML 100 UNIT/ML
5 SOLUTION INTRAVENOUS
Status: DISCONTINUED | OUTPATIENT
Start: 2021-02-24 | End: 2021-02-24 | Stop reason: HOSPADM

## 2021-02-24 RX ADMIN — Medication 5 ML: at 08:12

## 2021-02-24 RX ADMIN — SODIUM CHLORIDE 1000 ML: 9 INJECTION, SOLUTION INTRAVENOUS at 11:18

## 2021-02-24 RX ADMIN — Medication 5 ML: at 12:19

## 2021-02-24 ASSESSMENT — PAIN SCALES - GENERAL: PAINLEVEL: NO PAIN (0)

## 2021-02-24 NOTE — NURSING NOTE
"Chief Complaint   Patient presents with     Port Draw     port accessed and labs drawn by rn in lab.  vital signs taken.     Port accessed by RN in lab with 20g 3/4\" power needle, labs drawn, port flushed with saline and heparin, vitals checked, pt checked in for next appointment.    Gail De La Torre RN      "

## 2021-02-24 NOTE — PROGRESS NOTES
Infusion Nursing Note:  Reuben Padilla presents for IVF  Met with SERGIO Barker before infusion.    Note: pt feeling well today, no new issues or concerns to report.    WBC and platelets low today, per Nicole's note, no chemo tomorrow (IVF instead) and pt to come back on Monday for possible chemo pending counts.    Pain: denies    Treatment Conditions:  Lab Results   Component Value Date    HGB 9.7 02/24/2021     Lab Results   Component Value Date    WBC 1.2 02/24/2021      Lab Results   Component Value Date    ANEU 0.9 02/24/2021     Lab Results   Component Value Date    PLT 78 02/24/2021      Lab Results   Component Value Date     02/24/2021                   Lab Results   Component Value Date    POTASSIUM 3.9 02/24/2021           Lab Results   Component Value Date    MAG 1.9 11/07/2018            Lab Results   Component Value Date    CR 0.95 02/24/2021                   Lab Results   Component Value Date    NIDHI 8.0 02/24/2021                Lab Results   Component Value Date    BILITOTAL 1.3 02/24/2021           Lab Results   Component Value Date    ALBUMIN 3.1 02/24/2021                    Lab Results   Component Value Date    ALT 18 02/24/2021           Lab Results   Component Value Date    AST 9 02/24/2021         Intravenous Access:  Implanted Port.    Post Infusion Assessment:  Patient tolerated infusion without incident.  Blood return noted pre and post infusion.  No evidence of extravasations.  Needle left intact for infusion tomorrow.    Discharge Plan:   Patient declined prescription refills (pt gets his meds filled through a mail order pharmacy)  Discharge instructions reviewed with: Patient.  Patient and/or family verbalized understanding of discharge instructions and all questions answered.  AVS to patient via [employer].  Patient will return tomorrow for next appointment.   Patient discharged in stable condition accompanied by: self.    Lily Mahoney, RN, RN

## 2021-02-24 NOTE — PROGRESS NOTES
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 His tumor is positive for cytokeratin 20, negative for P63 and CK7, and HER2 is negative. He started on FOLFOX (5FU/oxaliplatin) on 5/15/2017. He had a delay in treatment from 9/5-10/2/17 due to work related injury.     He had an excellent response by imaging throughout the summer 2017.    He a mixed response by imaging in late fall 2017.    In January 2018, he was switched to taxol and cyramza - had slight progression and neuropathy and clinical trial became available.        In March 2018, he was started on the [facility] Match Clinical Trial with crizotinib.  (MET amplification) He remained on crizotinib from March - July 2018.     August 2018, he was switched back to taxol/cramza.  In October 2018, he was switched to Keytruda (PD1 overexpressor).              -April 2019, his infusion was held for three weeks, and he was treated with prednisone and Enbrel for grade 3 arthralgias. Keytruda was resumed              -May 2020 he started Actemra (5/14).  This was initially WEEKLY and then in August- moved to QOW (secondary to fatigue and concerns for low WBC).                -August 2020 moved to q6 week Keytruda (still at the 200 mg dose)              -November 2020 moved back to q3w Keytruda  CT 12/30/2020: progression of esophageal mass   1/14/21: PET showing growth of primary tumor only, discussion of radiation   1/28/21: started chemoradiation with weekly carbo/taxol and daily radiation     INTERVAL HISTORY:   Levi is 4 weeks into his chemoradiation. Tomorrow he would be due for #5.  This last week he has felt pretty washed out and tired. IVF helps- he feels less dehydrated.      He is  taking Zofran BID and had some intermittent nausea. He still has erratic BM, which is chronic for him.  Had a diarrhea stool this weekend, constipated last 2 days.  He is Drinking water/pedialyte- about 1/2 gallon daily.  He stopped the diuretic as we discussed last week (from ENT for menieres).  No vertigo, just having some intermittent orthostatic hypotension which IVF is helping.    He has had no issues with fevers or chills, no chest pain or shortness of breath, no nausea, vomiting, or constipation.  No rash. Has been tired, staying at his brothers in [place].  Sleeping OK at night.   Stamina is stable.        All his arthralgias (hands and chronic R knee) seem better.  Whether this is from steroids or just being off immunotherapy- this is better. He has better  strength, less pain.  He is happy about this.  He can get out of a chair without issues. Neuropathy in feet very stable.   Occ has some balance issues, this a little worse this week with the fatigue/dehydration.      He is on Coumadin now. His INR is being managed in [place].          ROS: 10 point ROS neg other than the symptoms noted above in the HPI.     PHYSICAL EXAMINATION  Wt Readings from Last 4 Encounters:   02/24/21 (!) 158.8 kg (350 lb)   02/18/21 (!) 155.1 kg (342 lb)   02/17/21 (!) 155.2 kg (342 lb 3.2 oz)   02/10/21 (!) 157.4 kg (347 lb)     /80 (BP Location: Right arm, Patient Position: Sitting, Cuff Size: Adult Large)   Pulse 107   Temp 97.3  F (36.3  C) (Tympanic)   Resp 16   Wt (!) 158.8 kg (350 lb)   SpO2 98%   BMI 40.54 kg/m    Vital signs were reviewed.   Patient alert and oriented x3.   PERRLA. EOMI. No scleral icterus noted. OP without thrush/sores.  Neck exam: No palpable cervical, supraclavicular or axillary nodes bilaterally.   Heart:Mildly tachy, no murmur   Lungs: clear to auscultation bilaterally.  No crackles or wheezing.   Abd: positive bowel sounds in all four quadrants.  No tenderness to palpation.   No hepatomegaly.   Extremities: No lower extremity edema, venous stasis discoloration to bilateral legs  Neuro: grossly intact.   Mood and affect is stable. Appears tired today     Labs:      2/17/2021 08:34 2/24/2021 08:17   Sodium 139 143   Potassium 3.4 3.9   Chloride 104 110 (H)   Carbon Dioxide 29 25   Urea Nitrogen 27 14   Creatinine 1.16 0.95   GFR Estimate 68 86   GFR Estimate If Black 79 >90   Calcium 8.7 8.0 (L)   Anion Gap 6 8   Albumin 3.6 3.1 (L)   Protein Total 7.0 6.2 (L)   Bilirubin Total 1.6 (H) 1.3   Alkaline Phosphatase 63 53   ALT 29 18   AST 17 9   TSH 1.32    Glucose 104 (H) 106 (H)   WBC 1.8 (L) 1.2 (L)   Hemoglobin 12.3 (L) 9.7 (L)   Hematocrit 34.7 (L) 28.3 (L)   Platelet Count 111 (L) 78 (L)   RBC Count 4.02 (L) 3.15 (L)   MCV 86 90   MCH 30.6 30.8   MCHC 35.4 34.3   RDW 12.8 14.0   Diff Method Automated Method Automated Method   % Neutrophils 78.2 80.9   % Lymphocytes 13.2 13.0   % Monocytes 7.1 5.2   % Eosinophils 0.5 0.0   % Basophils 0.5 0.0   % Immature Granulocytes 0.5 0.9   Nucleated RBCs 0 0   Absolute Neutrophil 1.4 (L) 0.9 (L)   Absolute Lymphocytes 0.2 (L) 0.2 (L)     IMPRESSION/PLAN:  1. Metastatic esophogeal adenocarcinoma. He has stable disease on Keytruda, for TWO years now, which is great news.  Given ongoing arthralgias and fatigue and stable disease, his Keytruda was dropped back to every 6 weeks, still at the 200 mg dose.  He saw Dr Dee in early November and his CT was mildly progressed at his GE junction tumor.  His dose was changed back to 200 mg every 3 weeks (verses 6 weeks). Levi noted worsening dysphagia, thus CT CAP was done on 12/30- showing ongoing primary tumor worsening with no evidence of metastatic disease.      1/14/21 PET showed only active disease at his primary site and given his dysphagia, Levi started concurrent chemoradiation with weekly carbo/taxol on 1/28.  Tomorrow is WEEK 5.  Levi and I discussed his ANC is only 900, thus we will HOLD chemo  this week.  I will reschedule for 3/1.  We discussed his counts may or may not be recovered.     -neutropenia: discussed precautions  -TCP: will check again tomorrow + INR, if dropping may need to hold coumadin thru weekend  -Dehydration- schedule IVF 3 x week this week and next week, continue to hold diuretic   -mild post chemo constipation/diarrhea:  metamucil daily  -mild post chemo nausea: taking Zofran BID  -mild discomfort, esophagitis: still eating solids, will continue PPI, can increase to BID.  Start carafate TID (he didn't do this last week), not needing analgesics     2. Immune related arthralgias- still thenar atrophy in his hands, but good strength. And actually improving strength and coordination.  No pain and sig less stiffness.  The steroids helped his hands and he is pleased this is getting better.  He is moving better than he has all year.      3. H/o PE 2018.   He has progressed through Xarelto in the past (didn't take it regularly).  He was then on lovenox but his insurance changed and this became unaffordable. He is now on Coumadin and labs drawn  at [facility] in [place] fax ([phone]).  He is monitored by our coumadin clinic.  -watching platelets closely with therapy     4. Baseline neuropathy is marginally worse in his legs with just numbness, none in his hands.  Continues on gabapentin BID, its primarily sensory in his feet only.  Has some balance issues on occasion, likely 2/2 to his weakness--this is stable.  Just saw podiatry and has orthotics made.      5. Weight loss: down about 20 pounds in a couple weeks.  Eating OK, will start daily Boost/Ensure. Extra hydration as mentioned above     6. Possible appendicitis on last CT? Reviewed, improved and currently no symptoms     7. Episode of tinnitus and vertigo- Levi had an intense episode of vertigo last week when moving positions in his recliner, associated with diaphoresis and nausea.  Symptoms resolved over  night, but then the next evening with the same position change in his chair, he had another episode.  Discussed positional vertigo verses Meniere's - seeing ENT and PT, taking the diuretic, no vertigo since  -had audiogram and seeing balance clinic next week  -stop diuretic till post chemo     8. Elevated t.bili- this was likely acute dehydration last week, improved/stable    Nicole Sutherland PA-C    Final plan: IVF tomorrow (instead of chemo), recheck CBC to trend platelets.  Will recheck labs on 3/1, consider last week of chemo if counts are OK.    40 minutes spent on the date of the encounter doing chart review, review of test results, interpretation of tests, patient visit and documentation

## 2021-02-24 NOTE — NURSING NOTE
"Oncology Rooming Note    February 24, 2021 8:33 AM   Reuben Padilla is a 60 year old male who presents for:    Chief Complaint   Patient presents with     Port Draw     port accessed and labs drawn by rn in lab.  vital signs taken.     Oncology Clinic Visit     esophageal cancer      Initial Vitals: /80 (BP Location: Right arm, Patient Position: Sitting, Cuff Size: Adult Large)   Pulse 107   Temp 97.3  F (36.3  C) (Tympanic)   Resp 16   Wt (!) 158.8 kg (350 lb)   SpO2 98%   BMI 40.54 kg/m   Estimated body mass index is 40.54 kg/m  as calculated from the following:    Height as of 2/10/21: 1.979 m (6' 5.91\").    Weight as of this encounter: 158.8 kg (350 lb). Body surface area is 2.95 meters squared.  No Pain (0) Comment: Data Unavailable   No LMP for male patient.  Allergies reviewed: Yes  Medications reviewed: Yes    Medications: Medication refills not needed today.  Pharmacy name entered into [facility]:    [id number] - CHRISTOPHER DUENAS - [address] [facility] - [facility] [address]    Clinical concerns: none       Gianna Reece CMA              "

## 2021-02-25 ENCOUNTER — ANTICOAGULATION THERAPY VISIT (OUTPATIENT)
Dept: ANTICOAGULATION | Facility: CLINIC | Age: 61
End: 2021-02-25

## 2021-02-25 ENCOUNTER — APPOINTMENT (OUTPATIENT)
Dept: RADIATION ONCOLOGY | Facility: CLINIC | Age: 61
End: 2021-02-25
Attending: RADIOLOGY
Payer: MEDICARE

## 2021-02-25 ENCOUNTER — INFUSION THERAPY VISIT (OUTPATIENT)
Dept: ONCOLOGY | Facility: CLINIC | Age: 61
End: 2021-02-25
Attending: INTERNAL MEDICINE
Payer: MEDICARE

## 2021-02-25 VITALS
TEMPERATURE: 98.3 F | HEART RATE: 108 BPM | DIASTOLIC BLOOD PRESSURE: 71 MMHG | SYSTOLIC BLOOD PRESSURE: 106 MMHG | OXYGEN SATURATION: 98 % | RESPIRATION RATE: 17 BRPM

## 2021-02-25 VITALS
DIASTOLIC BLOOD PRESSURE: 82 MMHG | HEART RATE: 109 BPM | WEIGHT: 315 LBS | SYSTOLIC BLOOD PRESSURE: 122 MMHG | BODY MASS INDEX: 41.2 KG/M2

## 2021-02-25 DIAGNOSIS — C15.5 CANCER OF DISTAL THIRD OF ESOPHAGUS (H): Primary | ICD-10-CM

## 2021-02-25 DIAGNOSIS — I26.99 OTHER ACUTE PULMONARY EMBOLISM WITHOUT ACUTE COR PULMONALE (H): ICD-10-CM

## 2021-02-25 DIAGNOSIS — I26.99 ACUTE PULMONARY EMBOLISM (H): ICD-10-CM

## 2021-02-25 DIAGNOSIS — Z86.711 HX OF PULMONARY EMBOLUS: ICD-10-CM

## 2021-02-25 DIAGNOSIS — C78.7 MALIGNANT NEOPLASM METASTATIC TO LIVER (H): Primary | ICD-10-CM

## 2021-02-25 DIAGNOSIS — I95.1 ORTHOSTATIC HYPOTENSION: ICD-10-CM

## 2021-02-25 DIAGNOSIS — C15.5 CANCER OF DISTAL THIRD OF ESOPHAGUS (H): ICD-10-CM

## 2021-02-25 DIAGNOSIS — T45.1X5A CHEMOTHERAPY-INDUCED NEUTROPENIA (H): ICD-10-CM

## 2021-02-25 DIAGNOSIS — D70.1 CHEMOTHERAPY-INDUCED NEUTROPENIA (H): ICD-10-CM

## 2021-02-25 LAB
BASOPHILS # BLD AUTO: 0 10E9/L (ref 0–0.2)
BASOPHILS NFR BLD AUTO: 0.9 %
DIFFERENTIAL METHOD BLD: ABNORMAL
EOSINOPHIL # BLD AUTO: 0 10E9/L (ref 0–0.7)
EOSINOPHIL NFR BLD AUTO: 0 %
ERYTHROCYTE [DISTWIDTH] IN BLOOD BY AUTOMATED COUNT: 14.1 % (ref 10–15)
HCT VFR BLD AUTO: 26.2 % (ref 40–53)
HGB BLD-MCNC: 9.2 G/DL (ref 13.3–17.7)
IMM GRANULOCYTES # BLD: 0 10E9/L (ref 0–0.4)
IMM GRANULOCYTES NFR BLD: 0.9 %
INR PPP: 1.69 (ref 0.86–1.14)
LYMPHOCYTES # BLD AUTO: 0.1 10E9/L (ref 0.8–5.3)
LYMPHOCYTES NFR BLD AUTO: 12.7 %
MCH RBC QN AUTO: 31.1 PG (ref 26.5–33)
MCHC RBC AUTO-ENTMCNC: 35.1 G/DL (ref 31.5–36.5)
MCV RBC AUTO: 89 FL (ref 78–100)
MONOCYTES # BLD AUTO: 0.1 10E9/L (ref 0–1.3)
MONOCYTES NFR BLD AUTO: 7.3 %
NEUTROPHILS # BLD AUTO: 0.9 10E9/L (ref 1.6–8.3)
NEUTROPHILS NFR BLD AUTO: 78.2 %
NRBC # BLD AUTO: 0 10*3/UL
NRBC BLD AUTO-RTO: 0 /100
PLATELET # BLD AUTO: 71 10E9/L (ref 150–450)
RBC # BLD AUTO: 2.96 10E12/L (ref 4.4–5.9)
WBC # BLD AUTO: 1.1 10E9/L (ref 4–11)

## 2021-02-25 PROCEDURE — 96360 HYDRATION IV INFUSION INIT: CPT

## 2021-02-25 PROCEDURE — 250N000011 HC RX IP 250 OP 636: Performed by: PHYSICIAN ASSISTANT

## 2021-02-25 PROCEDURE — 77386 HC IMRT TREATMENT DELIVERY, COMPLEX: CPT | Performed by: RADIOLOGY

## 2021-02-25 PROCEDURE — 85610 PROTHROMBIN TIME: CPT | Performed by: PHYSICIAN ASSISTANT

## 2021-02-25 PROCEDURE — 77014 PR CT GUIDE FOR PLACEMENT RADIATION THERAPY FIELDS: CPT | Mod: 26 | Performed by: RADIOLOGY

## 2021-02-25 PROCEDURE — 85025 COMPLETE CBC W/AUTO DIFF WBC: CPT | Performed by: PHYSICIAN ASSISTANT

## 2021-02-25 PROCEDURE — 258N000003 HC RX IP 258 OP 636: Performed by: PHYSICIAN ASSISTANT

## 2021-02-25 RX ORDER — HEPARIN SODIUM,PORCINE 10 UNIT/ML
5 VIAL (ML) INTRAVENOUS
Status: CANCELLED | OUTPATIENT
Start: 2021-02-25

## 2021-02-25 RX ORDER — HEPARIN SODIUM (PORCINE) LOCK FLUSH IV SOLN 100 UNIT/ML 100 UNIT/ML
5 SOLUTION INTRAVENOUS
Status: DISCONTINUED | OUTPATIENT
Start: 2021-02-25 | End: 2021-02-25 | Stop reason: HOSPADM

## 2021-02-25 RX ORDER — HEPARIN SODIUM (PORCINE) LOCK FLUSH IV SOLN 100 UNIT/ML 100 UNIT/ML
5 SOLUTION INTRAVENOUS
Status: CANCELLED | OUTPATIENT
Start: 2021-02-25

## 2021-02-25 RX ADMIN — Medication 5 ML: at 08:53

## 2021-02-25 RX ADMIN — SODIUM CHLORIDE 1000 ML: 9 INJECTION, SOLUTION INTRAVENOUS at 07:53

## 2021-02-25 ASSESSMENT — PAIN SCALES - GENERAL: PAINLEVEL: NO PAIN (0)

## 2021-02-25 NOTE — PROGRESS NOTES
WEEKLY MANAGEMENT NOTE  Radiation Oncology          Patient Name: Reuben Padilla  MRN: [id number]     Chest-Abdomen  4200 cGy / 5000 cGy    21/ 25        DAILY DOSE:     200  cGy/ day,  5 times/week      DISEASE UNDER TREATMENT: Consolidation to primary after successful immunotherapy for metastatic esophageal cancer.    CHEMOTHERAPY: PACLITAXEL/ CARBOPLATIN weekly    SUBJECTIVE: Carafate started with some relief of esophagitis    CTC V5.0 Toxicity Criteria  Fatigue: Grade 1: Fatigue relieved by rest  Skin: Grade 0: No toxicity  Comment:    PULMONARY:  Dyspnea: Grade 0: No toxicity    GI:  Nausea: Grade 2: Oral intake decreased without significant weight loss, dehydration or malnutrition  Mucositis: Grade 2: Moderate pain; not interfering with oral intake; modified diet indicated  Comment:        OBJECTIVE:  /82   Pulse 109   Wt (!) 161.3 kg (355 lb 11.2 oz)   BMI 41.20 kg/m     Wt Readings from Last 2 Encounters:   02/25/21 (!) 161.3 kg (355 lb 11.2 oz)   02/24/21 (!) 158.8 kg (350 lb)       CBC RESULTS:   Recent Labs   Lab Test 02/25/21  0751   WBC 1.1*   RBC 2.96*   HGB 9.2*   HCT 26.2*   MCV 89   MCH 31.1   MCHC 35.1   RDW 14.1   PLT 71*     Recent Labs   Lab Test 02/24/21  0817 02/17/21  0834    139   POTASSIUM 3.9 3.4   CHLORIDE 110* 104   CO2 25 29   ANIONGAP 8 6   * 104*   BUN 14 27   CR 0.95 1.16   NIDHI 8.0* 8.7           IMPRESSION: The patient has esophagitis but is able to tolerate the treatment.  The patient set up, dose, and cone beam CT images were reviewed.    PLAN: Continue radiotherapy    PAIN MANAGEMENT PLAN: The patient does not require pain management    FERNANDO Costa M.D.  Department of Radiation Oncology  [facility]

## 2021-02-25 NOTE — PROGRESS NOTES
ANTICOAGULATION FOLLOW-UP CLINIC VISIT    Patient Name:  Reuben Padilla  Date:  2021  Contact Type:  Telephone    SUBJECTIVE:  Patient Findings     Comments:  Patient reports that Nicole Sutherland instructed him to start holding warfarin 2 days ago because of platelet count of 71 today.  Message is sent to Nicole Sutherland for further instruction.         Clinical Outcomes     Comments:  Patient reports that Nicole Sutherland instructed him to start holding warfarin 2 days ago because of platelet count of 71 today.  Message is sent to Nicole Sutherland for further instruction.            OBJECTIVE    Recent labs: (last 7 days)     21  0751   INR 1.69*       ASSESSMENT / PLAN  No question data found.  Anticoagulation Summary  As of 2021    INR goal:  2.0-3.0   TTR:  67.7 % (11.4 mo)   INR used for dosin.69 (2021)   Warfarin maintenance plan:  5 mg (5 mg x 1) every day   Full warfarin instructions:  : Hold; : Hold; : Hold; : Hold; Otherwise 5 mg every day   Weekly warfarin total:  35 mg   Plan last modified:  Delfina Thomas RN (2020)   Next INR check:  3/1/2021   Priority:  Maintenance   Target end date:  Indefinite    Indications    Acute pulmonary embolism (H) [I26.99]  Hx of pulmonary embolus [Z86.711]  Other acute pulmonary embolism without acute cor pulmonale (H) [I26.99]             Anticoagulation Episode Summary     INR check location:      Preferred lab:      Send INR reminders to:  [facility]    Comments:  [facility] ([phone] (F)[phone], Takes Warfarin in the am      Anticoagulation Care Providers     Provider Role Specialty Phone number    Kadi Dee MD Referring Hematology & Oncology [phone]            See the Encounter Report to view Anticoagulation Flowsheet and Dosing Calendar (Go to Encounters tab in chart review, and find the Anticoagulation Therapy Visit)    Spoke with patient.     Carola Box RN

## 2021-02-25 NOTE — LETTER
2/25/2021         RE: Reuben Padilla  [address] [facility] [address]        Dear Colleague,    Thank you for referring your patient, Reuben Padilla, to the [facility] RADIATION ONCOLOGY. Please see a copy of my visit note below.    WEEKLY MANAGEMENT NOTE  Radiation Oncology          Patient Name: Reuben Padilla  MRN: [id number]     Chest-Abdomen  4200 cGy / 5000 cGy    21/ 25        DAILY DOSE:     200  cGy/ day,  5 times/week      DISEASE UNDER TREATMENT: Consolidation to primary after successful immunotherapy for metastatic esophageal cancer.    CHEMOTHERAPY: PACLITAXEL/ CARBOPLATIN weekly    SUBJECTIVE: Carafate started with some relief of esophagitis    CTC V5.0 Toxicity Criteria  Fatigue: Grade 1: Fatigue relieved by rest  Skin: Grade 0: No toxicity  Comment:    PULMONARY:  Dyspnea: Grade 0: No toxicity    GI:  Nausea: Grade 2: Oral intake decreased without significant weight loss, dehydration or malnutrition  Mucositis: Grade 2: Moderate pain; not interfering with oral intake; modified diet indicated  Comment:        OBJECTIVE:  /82   Pulse 109   Wt (!) 161.3 kg (355 lb 11.2 oz)   BMI 41.20 kg/m     Wt Readings from Last 2 Encounters:   02/25/21 (!) 161.3 kg (355 lb 11.2 oz)   02/24/21 (!) 158.8 kg (350 lb)       CBC RESULTS:   Recent Labs   Lab Test 02/25/21  0751   WBC 1.1*   RBC 2.96*   HGB 9.2*   HCT 26.2*   MCV 89   MCH 31.1   MCHC 35.1   RDW 14.1   PLT 71*     Recent Labs   Lab Test 02/24/21  0817 02/17/21  0834    139   POTASSIUM 3.9 3.4   CHLORIDE 110* 104   CO2 25 29   ANIONGAP 8 6   * 104*   BUN 14 27   CR 0.95 1.16   NIDHI 8.0* 8.7           IMPRESSION: The patient has esophagitis but is able to tolerate the treatment.  The patient set up, dose, and cone beam CT images were reviewed.    PLAN: Continue radiotherapy    PAIN MANAGEMENT PLAN: The patient does not require pain management    FERNANDO Costa M.D.  Department of Radiation  Oncology  [facility]

## 2021-02-25 NOTE — PROGRESS NOTES
Infusion Nursing Note:  Reuben Padilla presents today for IVF.    Patient seen by provider today: No   present during visit today: Not Applicable.    Note: Levi presents to infusion today stating he feels better after IVF yesterday, his nausea and constipation is improving. He denies any new symptoms, concerns, or pain today. No chemo today due to low WBC and platelets, along with increased fatigue- IVF are improving this. No changes since seeing SERGIO Barker yesterday. Plan for possible chemo on Monday depending on blood counts. Patient stated he stopped taking his Warfarin last night, INR and CBC drawn in infusion.     Patient did not meet criteria for an asymptomatic covid-19 PCR test in infusion today. Patient declined the covid-19 test.    Intravenous Access:  Implanted Port.    Treatment Conditions:  N/A- INR and CBC drawn per orders      Post Infusion Assessment:  Patient tolerated infusion without incident.  Blood return noted pre and post infusion.  Access discontinued per protocol.       Discharge Plan:   Prescription refills for Sucralfate filled, but patient will be picking up at pharmacy tomorrow after radiation treatment  AVS to patient via [employer].  Patient will return 3/1 for next appointment.   Patient discharged in stable condition accompanied by: self.  Departure Mode: Ambulatory.  Face to Face time: 0.    Alley Bennett RN

## 2021-02-26 ENCOUNTER — APPOINTMENT (OUTPATIENT)
Dept: RADIATION ONCOLOGY | Facility: CLINIC | Age: 61
End: 2021-02-26
Attending: RADIOLOGY
Payer: MEDICARE

## 2021-02-26 DIAGNOSIS — C16.9 METASTASIS FROM GASTRIC CANCER (H): ICD-10-CM

## 2021-02-26 DIAGNOSIS — C79.9 METASTASIS FROM GASTRIC CANCER (H): ICD-10-CM

## 2021-02-26 PROCEDURE — 77386 HC IMRT TREATMENT DELIVERY, COMPLEX: CPT | Performed by: RADIOLOGY

## 2021-02-26 PROCEDURE — 77014 PR CT GUIDE FOR PLACEMENT RADIATION THERAPY FIELDS: CPT | Mod: 26 | Performed by: RADIOLOGY

## 2021-02-26 RX ORDER — ZOLPIDEM TARTRATE 10 MG/1
10 TABLET ORAL
Qty: 90 TABLET | Refills: 1 | Status: SHIPPED | OUTPATIENT
Start: 2021-02-26 | End: 2021-05-26

## 2021-02-28 DIAGNOSIS — C15.5 CANCER OF DISTAL THIRD OF ESOPHAGUS (H): ICD-10-CM

## 2021-02-28 DIAGNOSIS — D70.1 CHEMOTHERAPY-INDUCED NEUTROPENIA (H): Primary | ICD-10-CM

## 2021-02-28 DIAGNOSIS — T45.1X5A CHEMOTHERAPY-INDUCED NEUTROPENIA (H): Primary | ICD-10-CM

## 2021-02-28 RX ORDER — METHYLPREDNISOLONE SODIUM SUCCINATE 125 MG/2ML
125 INJECTION, POWDER, LYOPHILIZED, FOR SOLUTION INTRAMUSCULAR; INTRAVENOUS
Status: CANCELLED
Start: 2021-03-01

## 2021-02-28 RX ORDER — LORAZEPAM 2 MG/ML
0.5 INJECTION INTRAMUSCULAR EVERY 4 HOURS PRN
Status: CANCELLED
Start: 2021-03-01

## 2021-02-28 RX ORDER — DIPHENHYDRAMINE HYDROCHLORIDE 50 MG/ML
50 INJECTION INTRAMUSCULAR; INTRAVENOUS
Status: CANCELLED
Start: 2021-03-01

## 2021-02-28 RX ORDER — EPINEPHRINE 1 MG/ML
0.3 INJECTION, SOLUTION INTRAMUSCULAR; SUBCUTANEOUS EVERY 5 MIN PRN
Status: CANCELLED | OUTPATIENT
Start: 2021-03-01

## 2021-02-28 RX ORDER — HEPARIN SODIUM,PORCINE 10 UNIT/ML
5 VIAL (ML) INTRAVENOUS
Status: CANCELLED
Start: 2021-03-01

## 2021-02-28 RX ORDER — HEPARIN SODIUM (PORCINE) LOCK FLUSH IV SOLN 100 UNIT/ML 100 UNIT/ML
5 SOLUTION INTRAVENOUS
Status: CANCELLED
Start: 2021-03-01

## 2021-02-28 RX ORDER — MEPERIDINE HYDROCHLORIDE 25 MG/ML
25 INJECTION INTRAMUSCULAR; INTRAVENOUS; SUBCUTANEOUS EVERY 30 MIN PRN
Status: CANCELLED | OUTPATIENT
Start: 2021-03-01

## 2021-02-28 RX ORDER — NALOXONE HYDROCHLORIDE 0.4 MG/ML
.1-.4 INJECTION, SOLUTION INTRAMUSCULAR; INTRAVENOUS; SUBCUTANEOUS
Status: CANCELLED | OUTPATIENT
Start: 2021-03-01

## 2021-02-28 RX ORDER — SODIUM CHLORIDE 9 MG/ML
1000 INJECTION, SOLUTION INTRAVENOUS CONTINUOUS PRN
Status: CANCELLED
Start: 2021-03-01

## 2021-02-28 RX ORDER — ALBUTEROL SULFATE 90 UG/1
1-2 AEROSOL, METERED RESPIRATORY (INHALATION)
Status: CANCELLED
Start: 2021-03-01

## 2021-02-28 RX ORDER — DIPHENHYDRAMINE HCL 25 MG
25 CAPSULE ORAL ONCE
Status: CANCELLED
Start: 2021-03-01

## 2021-02-28 RX ORDER — ALBUTEROL SULFATE 0.83 MG/ML
2.5 SOLUTION RESPIRATORY (INHALATION)
Status: CANCELLED | OUTPATIENT
Start: 2021-03-01

## 2021-03-01 ENCOUNTER — INFUSION THERAPY VISIT (OUTPATIENT)
Dept: ONCOLOGY | Facility: CLINIC | Age: 61
End: 2021-03-01
Attending: PHYSICIAN ASSISTANT
Payer: MEDICARE

## 2021-03-01 ENCOUNTER — APPOINTMENT (OUTPATIENT)
Dept: LAB | Facility: CLINIC | Age: 61
End: 2021-03-01
Attending: INTERNAL MEDICINE
Payer: MEDICARE

## 2021-03-01 ENCOUNTER — TELEPHONE (OUTPATIENT)
Dept: AUDIOLOGY | Facility: CLINIC | Age: 61
End: 2021-03-01

## 2021-03-01 ENCOUNTER — APPOINTMENT (OUTPATIENT)
Dept: RADIATION ONCOLOGY | Facility: CLINIC | Age: 61
End: 2021-03-01
Attending: RADIOLOGY
Payer: MEDICARE

## 2021-03-01 VITALS
BODY MASS INDEX: 40.11 KG/M2 | RESPIRATION RATE: 16 BRPM | DIASTOLIC BLOOD PRESSURE: 78 MMHG | OXYGEN SATURATION: 99 % | TEMPERATURE: 97.1 F | WEIGHT: 315 LBS | SYSTOLIC BLOOD PRESSURE: 128 MMHG | HEART RATE: 88 BPM

## 2021-03-01 DIAGNOSIS — I95.1 ORTHOSTATIC HYPOTENSION: ICD-10-CM

## 2021-03-01 DIAGNOSIS — C15.5 CANCER OF DISTAL THIRD OF ESOPHAGUS (H): Primary | ICD-10-CM

## 2021-03-01 DIAGNOSIS — C78.7 MALIGNANT NEOPLASM METASTATIC TO LIVER (H): ICD-10-CM

## 2021-03-01 DIAGNOSIS — T45.1X5A CHEMOTHERAPY-INDUCED NEUTROPENIA (H): ICD-10-CM

## 2021-03-01 DIAGNOSIS — D70.1 CHEMOTHERAPY-INDUCED NEUTROPENIA (H): ICD-10-CM

## 2021-03-01 LAB
ALBUMIN SERPL-MCNC: 3.1 G/DL (ref 3.4–5)
ALP SERPL-CCNC: 62 U/L (ref 40–150)
ALT SERPL W P-5'-P-CCNC: 14 U/L (ref 0–70)
ANION GAP SERPL CALCULATED.3IONS-SCNC: 8 MMOL/L (ref 3–14)
AST SERPL W P-5'-P-CCNC: 9 U/L (ref 0–45)
BASOPHILS # BLD AUTO: 0 10E9/L (ref 0–0.2)
BASOPHILS NFR BLD AUTO: 0.7 %
BILIRUB SERPL-MCNC: 2.1 MG/DL (ref 0.2–1.3)
BUN SERPL-MCNC: 13 MG/DL (ref 7–30)
CALCIUM SERPL-MCNC: 8.5 MG/DL (ref 8.5–10.1)
CHLORIDE SERPL-SCNC: 109 MMOL/L (ref 94–109)
CO2 SERPL-SCNC: 25 MMOL/L (ref 20–32)
CREAT SERPL-MCNC: 1.09 MG/DL (ref 0.66–1.25)
DIFFERENTIAL METHOD BLD: ABNORMAL
EOSINOPHIL # BLD AUTO: 0 10E9/L (ref 0–0.7)
EOSINOPHIL NFR BLD AUTO: 0.7 %
ERYTHROCYTE [DISTWIDTH] IN BLOOD BY AUTOMATED COUNT: 16.4 % (ref 10–15)
GFR SERPL CREATININE-BSD FRML MDRD: 73 ML/MIN/{1.73_M2}
GLUCOSE SERPL-MCNC: 101 MG/DL (ref 70–99)
HCT VFR BLD AUTO: 26.8 % (ref 40–53)
HGB BLD-MCNC: 9.3 G/DL (ref 13.3–17.7)
IMM GRANULOCYTES # BLD: 0 10E9/L (ref 0–0.4)
IMM GRANULOCYTES NFR BLD: 0.7 %
LYMPHOCYTES # BLD AUTO: 0.2 10E9/L (ref 0.8–5.3)
LYMPHOCYTES NFR BLD AUTO: 10.6 %
MCH RBC QN AUTO: 31.3 PG (ref 26.5–33)
MCHC RBC AUTO-ENTMCNC: 34.7 G/DL (ref 31.5–36.5)
MCV RBC AUTO: 90 FL (ref 78–100)
MONOCYTES # BLD AUTO: 0.2 10E9/L (ref 0–1.3)
MONOCYTES NFR BLD AUTO: 13.9 %
NEUTROPHILS # BLD AUTO: 1.1 10E9/L (ref 1.6–8.3)
NEUTROPHILS NFR BLD AUTO: 73.4 %
NRBC # BLD AUTO: 0 10*3/UL
NRBC BLD AUTO-RTO: 0 /100
PLATELET # BLD AUTO: 71 10E9/L (ref 150–450)
POTASSIUM SERPL-SCNC: 3.6 MMOL/L (ref 3.4–5.3)
PROT SERPL-MCNC: 6.7 G/DL (ref 6.8–8.8)
RBC # BLD AUTO: 2.97 10E12/L (ref 4.4–5.9)
SODIUM SERPL-SCNC: 142 MMOL/L (ref 133–144)
WBC # BLD AUTO: 1.5 10E9/L (ref 4–11)

## 2021-03-01 PROCEDURE — 85025 COMPLETE CBC W/AUTO DIFF WBC: CPT | Performed by: INTERNAL MEDICINE

## 2021-03-01 PROCEDURE — 250N000011 HC RX IP 250 OP 636: Performed by: PHYSICIAN ASSISTANT

## 2021-03-01 PROCEDURE — 80053 COMPREHEN METABOLIC PANEL: CPT | Performed by: INTERNAL MEDICINE

## 2021-03-01 PROCEDURE — 258N000003 HC RX IP 258 OP 636: Performed by: PHYSICIAN ASSISTANT

## 2021-03-01 PROCEDURE — 96360 HYDRATION IV INFUSION INIT: CPT

## 2021-03-01 PROCEDURE — 77386 HC IMRT TREATMENT DELIVERY, COMPLEX: CPT | Performed by: RADIOLOGY

## 2021-03-01 RX ORDER — HEPARIN SODIUM (PORCINE) LOCK FLUSH IV SOLN 100 UNIT/ML 100 UNIT/ML
500 SOLUTION INTRAVENOUS ONCE
Status: COMPLETED | OUTPATIENT
Start: 2021-03-01 | End: 2021-03-01

## 2021-03-01 RX ORDER — HEPARIN SODIUM (PORCINE) LOCK FLUSH IV SOLN 100 UNIT/ML 100 UNIT/ML
5 SOLUTION INTRAVENOUS
Status: CANCELLED | OUTPATIENT
Start: 2021-03-01

## 2021-03-01 RX ORDER — HEPARIN SODIUM,PORCINE 10 UNIT/ML
5 VIAL (ML) INTRAVENOUS
Status: CANCELLED | OUTPATIENT
Start: 2021-03-01

## 2021-03-01 RX ORDER — HEPARIN SODIUM (PORCINE) LOCK FLUSH IV SOLN 100 UNIT/ML 100 UNIT/ML
5 SOLUTION INTRAVENOUS
Status: DISCONTINUED | OUTPATIENT
Start: 2021-03-01 | End: 2021-03-01 | Stop reason: HOSPADM

## 2021-03-01 RX ADMIN — Medication 500 UNITS: at 10:49

## 2021-03-01 RX ADMIN — SODIUM CHLORIDE 1000 ML: 9 INJECTION, SOLUTION INTRAVENOUS at 09:40

## 2021-03-01 RX ADMIN — Medication 5 ML: at 09:09

## 2021-03-01 ASSESSMENT — PAIN SCALES - GENERAL: PAINLEVEL: NO PAIN (0)

## 2021-03-01 NOTE — PROGRESS NOTES
Infusion Nursing Note:  Reuben Padilla presents today for Cycle 1 Day 29 Taxol, Carboplatin but canceled today due to low ANC and platelets. IVF only.    Patient seen by provider today: No    Note: Discussed labs with SERGIO Barker. Pt did not meet parameters for ANC or Platelets again today. Reviewed precautions with patient and that he will not receive his last chemotherapy day of Taxol/Carboplatin due to his counts. Pt will have visit with Dr. Dee tomorrow and return Wed/Thurs this week for IVF. Pt aware and agreeable to plan of care.    Intravenous Access:  Implanted Port.    Treatment Conditions:  Lab Results   Component Value Date    HGB 9.3 03/01/2021     Lab Results   Component Value Date    WBC 1.5 03/01/2021      Lab Results   Component Value Date    ANEU 1.1 03/01/2021     Lab Results   Component Value Date    PLT 71 03/01/2021      Lab Results   Component Value Date     03/01/2021                   Lab Results   Component Value Date    POTASSIUM 3.6 03/01/2021           Lab Results   Component Value Date    MAG 1.9 11/07/2018            Lab Results   Component Value Date    CR 1.09 03/01/2021                   Lab Results   Component Value Date    NIDHI 8.5 03/01/2021                Lab Results   Component Value Date    BILITOTAL 2.1 03/01/2021           Lab Results   Component Value Date    ALBUMIN 3.1 03/01/2021                    Lab Results   Component Value Date    ALT 14 03/01/2021           Lab Results   Component Value Date    AST 9 03/01/2021     Results reviewed, labs did NOT meet treatment parameters.    Post Infusion Assessment:  Patient tolerated infusion without incident.  Blood return noted pre and post infusion.  Site patent and intact, free from redness, edema or discomfort.  No evidence of extravasations. Access discontinued per protocol.     Discharge Plan:   Patient declined prescription refills.  AVS to patient via [employer].  Patient will return 3/3 for IVF appointment.    Patient discharged in stable condition accompanied by: self.  Departure Mode: Ambulatory.    Deann Jennings RN

## 2021-03-01 NOTE — TELEPHONE ENCOUNTER
"LVM.    \"I m calling from the Audiology and Balance Testing department at the . This is just a call to remind you of your upcoming Balance Testing appointment on 3/9, and to see if you have any questions or concerns regarding the balance testing you'll be doing. You should have received an itinerary via mail or via [employer], if you are active, that goes over what to expect and explains the dos and don ts both 48 hours before, and the day of. If you didn t receive the itinerary or you still have questions, please give our clinic a call at [phone]. Otherwise, we will see you on 3/9\"      Please send encounter if patient would like to reschedule      BALANCE TESTING  You are scheduled for 1 or more of the following tests:  VNG (Videonystagmography). You will wear goggles with small cameras. These record small eye movements as you change position. This test takes 1 to 1 1/2 hours.     Rotational Chair. You will sit a chair that has a motor. The room will be dark. You will wear goggles with a camera while the chair rocks slowly from side to side. This test takes 20 to 30 minutes.     CDP (Computerized Dynamic Posturography). You will stand on a platform with your eyes open or closed. It will be unsteady at times. This will tell us how your balance systems work together and how well you sense the ground under your feet. This test takes 30 minutes.     Why am I having these tests?  These are tests for your inner ear. They may help us find out why you feel dizzy or off balance.     How do I prepare?  Below is a list of things that can affect test results. Do NOT take these for 48 hours before your tests or we will need to reschedule. We want to make sure your test results are correct. Ask your doctor if you have questions about stopping any medicine.     48 hours before the tests:  Stop drinking alcohol (beer, wine, liquor).  Do NOT take Amitriptyline.  Do NOT take medicines for:  Allergy or colds. Examples: Benadryl " (diphenhydramine), Allegra (fexofenadine), Zyrtec (cetirizine) and any over-the-counter  medicine that may cause drowsiness.  Anti-anxiety, unless you have been on them every day for the past 8 weeks or more. Examples: Xanax (alprazolam), Klonopin  (clonazepam), Valium (diazepam), Ativan (lorazepam).  Dizziness and nausea. Examples: Antivert/Bonine/Dramamine Less-Drowsy Formula (meclizine), Dramamine (dimenhydrinate),  Trans-derm Scop (Scopolamine), Compazine (prochlorperazine), Phenergan (promethazine).  Sleeping. This includes sleeping pills, sedatives, tranquilizers, muscle relaxants and narcotics.  It is okay to take medicines for diabetes, heart, blood pressure, seizures, thyroid or an ongoing condition.     The day of the tests:    Please have a  with you.    Do not have caffeine (coffee, soda, chocolate, energy drinks).    Do not smoke or have nicotine for at least 4 hours before the tests. This includes tobacco, e-cigarettes and nicotine gum.    Do not eat 2 to 3 hours before the tests, unless you have diabetes. Then, you should follow your usual diet.    Do not wear any make-up or lotions around your eyes or eyelids.    If you wear contact lenses, be prepared to take them out for testing; or wear your glasses.    If you take the anti-nausea medicine Zofran (ondansetron), do not take it on the day of your test. You may bring it with you to take after your tests.     Who should I call if I have questions?  If you have any questions, please call the Balance Center at [facility]:  [phone]

## 2021-03-02 ENCOUNTER — ONCOLOGY VISIT (OUTPATIENT)
Dept: ONCOLOGY | Facility: CLINIC | Age: 61
End: 2021-03-02
Attending: INTERNAL MEDICINE
Payer: MEDICARE

## 2021-03-02 ENCOUNTER — APPOINTMENT (OUTPATIENT)
Dept: RADIATION ONCOLOGY | Facility: CLINIC | Age: 61
End: 2021-03-02
Attending: RADIOLOGY
Payer: MEDICARE

## 2021-03-02 VITALS
OXYGEN SATURATION: 97 % | HEIGHT: 78 IN | BODY MASS INDEX: 36.45 KG/M2 | RESPIRATION RATE: 18 BRPM | SYSTOLIC BLOOD PRESSURE: 115 MMHG | HEART RATE: 113 BPM | WEIGHT: 315 LBS | DIASTOLIC BLOOD PRESSURE: 76 MMHG

## 2021-03-02 DIAGNOSIS — C78.7 MALIGNANT NEOPLASM METASTATIC TO LIVER (H): ICD-10-CM

## 2021-03-02 DIAGNOSIS — I48.91 ATRIAL FIBRILLATION, UNSPECIFIED TYPE (H): ICD-10-CM

## 2021-03-02 DIAGNOSIS — R00.0 TACHYCARDIA: Primary | ICD-10-CM

## 2021-03-02 PROCEDURE — 93005 ELECTROCARDIOGRAM TRACING: CPT | Performed by: INTERNAL MEDICINE

## 2021-03-02 PROCEDURE — 77427 RADIATION TX MANAGEMENT X5: CPT | Performed by: RADIOLOGY

## 2021-03-02 PROCEDURE — 77014 PR CT GUIDE FOR PLACEMENT RADIATION THERAPY FIELDS: CPT | Mod: 26 | Performed by: RADIOLOGY

## 2021-03-02 PROCEDURE — 77336 RADIATION PHYSICS CONSULT: CPT | Performed by: RADIOLOGY

## 2021-03-02 PROCEDURE — 99215 OFFICE O/P EST HI 40 MIN: CPT | Performed by: INTERNAL MEDICINE

## 2021-03-02 PROCEDURE — 77386 HC IMRT TREATMENT DELIVERY, COMPLEX: CPT | Performed by: RADIOLOGY

## 2021-03-02 PROCEDURE — G0463 HOSPITAL OUTPT CLINIC VISIT: HCPCS | Performed by: INTERNAL MEDICINE

## 2021-03-02 ASSESSMENT — PAIN SCALES - GENERAL: PAINLEVEL: NO PAIN (0)

## 2021-03-02 ASSESSMENT — MIFFLIN-ST. JEOR: SCORE: 2520.86

## 2021-03-02 NOTE — NURSING NOTE
EKG was performed on patient. Patient tolerated EKG well without any incidents. EKG was printed and transmitted & given to Dr.Blaes Nati Thornton MA on 3/2/2021 at 8:41 AM

## 2021-03-02 NOTE — PROGRESS NOTES
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 His tumor is positive for cytokeratin 20, negative for P63 and CK7, and HER2 is negative. PDL1 + weakly.  He started on FOLFOX (5FU/oxaliplatin) on 5/15/2017. He had a delay in treatment from 9/5-10/2/17 due to work related injury.     He had an excellent response by imaging throughout the summer 2017.    He a mixed response by imaging in late fall 2017.    In January 2018, he was switched to taxol and cyramza - had slight progression and neuropathy and clinical trial became available.        In March 2018, he was started on the [facility] Match Clinical Trial with crizotinib.  (MET amplification) He remained on crizotinib from March - July 2018.     August 2018, he was switched back to taxol/cramza.  In October 2018, he was switched to Keytruda (PD1 overexpressor).              -April 2019, his infusion was held for three weeks, and he was treated with prednisone and Enbrel for grade 3 arthralgias. Keytruda was resumed              -May 2020 he started Actemra (5/14).  This was initially WEEKLY and then in August- moved to QOW (secondary to fatigue and concerns for low WBC).                -August 2020 moved to q6 week Keytruda (still at the 200 mg dose)              -November 2020 moved back to q3w Keytruda  CT 12/30/2020: progression of esophageal mass   1/14/21: PET showing growth of primary tumor only, discussion of radiation   1/28/21: started chemoradiation with weekly carbo/taxol and daily radiation     INTERVAL HISTORY:   Levi is 5 weeks into his chemoradiation. Tomorrow will be his last day of radiation therapy.  This last week he has felt pretty washed out and tired. IVF helps- he  feels less dehydrated.      He is taking Zofran BID and had some intermittent nausea. He still has erratic BM, which is chronic for him.  Had a diarrhea stool this weekend, constipated last 2 days.  He is Drinking water/pedialyte- about 1/2 gallon daily.    No vertigo.  Orthostatic hypotension is improving.    He has had no issues with fevers or chills, no chest pain or shortness of breath, no nausea, vomiting, or constipation.  No rash. Has been tired, staying at his brothers in [place]. He is anxious to return home when radiation is completed.        All his arthralgias (hands and chronic R knee) have resolved.   He has better  strength, less pain.  He is happy about this.  Neuropathy in feet very stable.   Occ has some balance issues, this a little worse this week with the fatigue/dehydration.       He is on Coumadin now though recently he held this given his low platelets. His INR is being managed in [place].          ROS: 10 point ROS neg other than the symptoms noted above in the HPI.     PHYSICAL EXAMINATION  Wt Readings from Last 4 Encounters:   03/01/21 (!) 157.1 kg (346 lb 4.8 oz)   02/25/21 (!) 161.3 kg (355 lb 11.2 oz)   02/24/21 (!) 158.8 kg (350 lb)   02/18/21 (!) 155.1 kg (342 lb)     There were no vitals taken for this visit.  Vital signs were reviewed.   Patient alert and oriented x3.   PERRLA. EOMI. No scleral icterus noted. OP without thrush/sores.  Neck exam: No palpable cervical, supraclavicular or axillary nodes bilaterally.   Heart:Mildly tachy, no murmur   Lungs: clear to auscultation bilaterally.  No crackles or wheezing.   Abd: positive bowel sounds in all four quadrants.  No tenderness to palpation.  No hepatomegaly.   Extremities: No lower extremity edema, venous stasis discoloration to bilateral legs  Neuro: grossly intact.   Mood and affect is stable. Appears tired today     Labs:     Lab Results   Component Value Date    WBC 1.5 03/01/2021     Lab Results   Component Value  Date    RBC 2.97 03/01/2021     Lab Results   Component Value Date    HGB 9.3 03/01/2021     Lab Results   Component Value Date    HCT 26.8 03/01/2021     No components found for: MCT  Lab Results   Component Value Date    MCV 90 03/01/2021     Lab Results   Component Value Date    MCH 31.3 03/01/2021     Lab Results   Component Value Date    MCHC 34.7 03/01/2021     Lab Results   Component Value Date    RDW 16.4 03/01/2021     Lab Results   Component Value Date    PLT 71 03/01/2021       Recent Labs   Lab Test 03/01/21  0914 02/24/21  0817    143   POTASSIUM 3.6 3.9   CHLORIDE 109 110*   CO2 25 25   ANIONGAP 8 8   * 106*   BUN 13 14   CR 1.09 0.95   NIDHI 8.5 8.0*     Liver Function Studies -   Recent Labs   Lab Test 03/01/21 0914   PROTTOTAL 6.7*   ALBUMIN 3.1*   BILITOTAL 2.1*   ALKPHOS 62   AST 9   ALT 14           IMPRESSION/PLAN:  1. Metastatic esophogeal adenocarcinoma. He has stable disease on Keytruda, for TWO years, which is great news.  Given ongoing arthralgias and fatigue and stable disease, his Keytruda was dropped back to every 6 weeks, still at the 200 mg dose.  He saw me in early November and his CT was mildly progressed at his GE junction tumor.  His dose was changed back to 200 mg every 3 weeks (verses 6 weeks). Levi noted worsening dysphagia, thus CT CAP was done on 12/30- showing ongoing primary tumor worsening with no evidence of metastatic disease. Levi started concurrent chemoradiation with weekly carbo/taxol on 1/28.      He will complete this this upcoming week.    We discussed continuing him on IVF 2-3 times weekly for at least the next 2-3 weeks.      I'd like him restaged in 8 weeks.  We discussed this may be done early but it will allow us to look at the liver as well.    2. Esophagitis -   still eating solids, will continue PPI, can increase to BID.  Continue carafate TID (he didn't do this last week), not needing analgesics     3. Immune related arthralgias- still thenar  atrophy in his hands, but good strength. And actually improving strength and coordination.  No pain and sig less stiffness.  The steroids helped his hands and he is pleased this is getting better.  He is moving better than he has all year.      4. H/o PE 2018.   He has progressed through Xarelto in the past (didn't take it regularly).  He was then on lovenox but his insurance changed and this became unaffordable. He is now on Coumadin and labs drawn  at [facility] in [place] fax ([phone]).  He is monitored by our coumadin clinic.  -watching platelets closely with therapy.  I discussed that next week, I want him to resume his coumadin.     5. Baseline neuropathy is marginally worse in his legs with just numbness, none in his hands.  Continues on gabapentin BID, its primarily sensory in his feet only.  Has some balance issues on occasion, likely 2/2 to his weakness--this is stable.       6. Possible appendicitis on last CT? Reviewed, improved and currently no symptoms     7. Episode of tinnitus and vertigo-had audiogram and seeing balance clinic next week  -stop diuretic till post chemo     8. Tachycardia - new afib.  He is on anticoagulation.  He is not in RVR.  He has a history of afib.  We discussed getting an echo, and monitoring closely.  He may need a bblocker added.             40 minutes spent on the date of the encounter doing chart review, review of test results, interpretation of tests, patient visit and documentation       Kadi Dee MD

## 2021-03-02 NOTE — NURSING NOTE
"Oncology Rooming Note    March 2, 2021 7:59 AM   Reuben Padilla is a 60 year old male who presents for:    Chief Complaint   Patient presents with     Oncology Clinic Visit     ESOPHAGEAL CANCER      Initial Vitals: /76   Pulse 113   Resp 18   Ht 1.981 m (6' 6\")   Wt (!) 157.8 kg (347 lb 12.8 oz)   SpO2 97%   BMI 40.19 kg/m   Estimated body mass index is 40.19 kg/m  as calculated from the following:    Height as of this encounter: 1.981 m (6' 6\").    Weight as of this encounter: 157.8 kg (347 lb 12.8 oz). Body surface area is 2.95 meters squared.  No Pain (0) Comment: Data Unavailable   No LMP for male patient.  Allergies reviewed: Yes  Medications reviewed: Yes    Medications: Medication refills not needed today.  Pharmacy name entered into Done.:    [facility] - [place], [place] - [address] [facility] - [facility] [address]    Clinical concerns: None.       Nati Thornton MA            "

## 2021-03-03 ENCOUNTER — INFUSION THERAPY VISIT (OUTPATIENT)
Dept: ONCOLOGY | Facility: CLINIC | Age: 61
End: 2021-03-03
Attending: PHYSICIAN ASSISTANT
Payer: MEDICARE

## 2021-03-03 ENCOUNTER — APPOINTMENT (OUTPATIENT)
Dept: RADIATION ONCOLOGY | Facility: CLINIC | Age: 61
End: 2021-03-03
Attending: RADIOLOGY
Payer: MEDICARE

## 2021-03-03 ENCOUNTER — DOCUMENTATION ONLY (OUTPATIENT)
Dept: ANTICOAGULATION | Facility: CLINIC | Age: 61
End: 2021-03-03

## 2021-03-03 VITALS
SYSTOLIC BLOOD PRESSURE: 107 MMHG | TEMPERATURE: 98.8 F | OXYGEN SATURATION: 98 % | HEART RATE: 106 BPM | RESPIRATION RATE: 16 BRPM | DIASTOLIC BLOOD PRESSURE: 76 MMHG

## 2021-03-03 DIAGNOSIS — Z86.711 HX OF PULMONARY EMBOLUS: ICD-10-CM

## 2021-03-03 DIAGNOSIS — I95.1 ORTHOSTATIC HYPOTENSION: Primary | ICD-10-CM

## 2021-03-03 DIAGNOSIS — C15.5 CANCER OF DISTAL THIRD OF ESOPHAGUS (H): ICD-10-CM

## 2021-03-03 DIAGNOSIS — I26.99 ACUTE PULMONARY EMBOLISM (H): ICD-10-CM

## 2021-03-03 DIAGNOSIS — I26.99 OTHER ACUTE PULMONARY EMBOLISM WITHOUT ACUTE COR PULMONALE (H): ICD-10-CM

## 2021-03-03 LAB — INTERPRETATION ECG - MUSE: NORMAL

## 2021-03-03 PROCEDURE — 77386 HC IMRT TREATMENT DELIVERY, COMPLEX: CPT | Performed by: RADIOLOGY

## 2021-03-03 PROCEDURE — 96360 HYDRATION IV INFUSION INIT: CPT

## 2021-03-03 PROCEDURE — 96361 HYDRATE IV INFUSION ADD-ON: CPT

## 2021-03-03 PROCEDURE — 77014 PR CT GUIDE FOR PLACEMENT RADIATION THERAPY FIELDS: CPT | Mod: 26 | Performed by: RADIOLOGY

## 2021-03-03 PROCEDURE — 258N000003 HC RX IP 258 OP 636: Performed by: PHYSICIAN ASSISTANT

## 2021-03-03 PROCEDURE — 250N000011 HC RX IP 250 OP 636: Performed by: PHYSICIAN ASSISTANT

## 2021-03-03 RX ORDER — HEPARIN SODIUM (PORCINE) LOCK FLUSH IV SOLN 100 UNIT/ML 100 UNIT/ML
5 SOLUTION INTRAVENOUS ONCE
Status: COMPLETED | OUTPATIENT
Start: 2021-03-03 | End: 2021-03-03

## 2021-03-03 RX ADMIN — SODIUM CHLORIDE 1000 ML: 9 INJECTION, SOLUTION INTRAVENOUS at 09:57

## 2021-03-03 RX ADMIN — Medication 5 ML: at 11:29

## 2021-03-03 ASSESSMENT — PAIN SCALES - GENERAL: PAINLEVEL: NO PAIN (0)

## 2021-03-03 NOTE — PROGRESS NOTES
"Infusion Nursing Note:  Reuben Padilla presents today for IV hydration    Patient seen by provider today: No   present during visit today: Not Applicable.    Note:     Levi arrives to infusion today not feeling great; he fell asleep last night before he took his PM medications (which in included zofran) and he woke up in the night with some nausea (no vomiting). Reports he was vividely dreaming all night, so he just 'didn't sleep good'. Additionally, he reports he had a couple of night sweats, but thinks his room was \"just too hot\". He feels uncomfortable d/t constipation and will add in some senna-s today.    Overall, Levi feels he just tired of being away from home and has had accumulating symptoms r/t treatment, he does not feel anything is really 'worse' persay to contact Dr. Dee. RN suggested he set a reminder to take his medications just a little earlier tonight so he doesn't miss them, start Senna-S, and then we will re-assess him tomorrow and see if there is anything else we need to do for him before the weekend.     Today c/o some esophageal discomfort today, he usually takes carafate which helps for a short time. Denies need for further intervention.     BP initally hypotensive but improved after some fluids.  Post fluids 109/72      Intravenous Access:  Implanted Port.    Treatment Conditions:  Not Applicable.      Post Infusion Assessment:  Patient tolerated infusion without incident.  Blood return noted pre and post infusion.       Discharge Plan:   Discharge instructions reviewed with: Patient.  Patient and/or family verbalized understanding of discharge instructions and all questions answered.  AVS to patient via [employer].  Patient will return 3/4 for next appointment.     Priti Lynch RN                        "

## 2021-03-03 NOTE — PROGRESS NOTES
Message is left for patient to return call to [facility] clinic and clarify if warfarin continues to be on hold.  Message is also sent to Nicole Sutherland requesting clarification.       Addendum 3/3/21  Lisa reports that Dr. Dee instructed him yesterday to continue holding warfarin. Reevaluate next week. [facility]    Addendum 3/5/21 Received an in-basket message from SERGIO Trammell letting us know to follow-up with cardiology at pt's appointment 3/11. Pool message sent. Delfina Thomas RN    Addendum 3/15/21  The below message was received from Nicole Sutherland on 3/12.    [place] Sutherland PA-C Hoekstra, Wendy K, RN             Carola- I am talking to lisa now, his platelets are 128.  So we are GOOD to re-start now.       I told lisa this on our phone call

## 2021-03-04 ENCOUNTER — INFUSION THERAPY VISIT (OUTPATIENT)
Dept: ONCOLOGY | Facility: CLINIC | Age: 61
End: 2021-03-04
Attending: PHYSICIAN ASSISTANT
Payer: MEDICARE

## 2021-03-04 VITALS
DIASTOLIC BLOOD PRESSURE: 69 MMHG | SYSTOLIC BLOOD PRESSURE: 102 MMHG | HEART RATE: 104 BPM | RESPIRATION RATE: 18 BRPM | TEMPERATURE: 98.6 F | OXYGEN SATURATION: 95 %

## 2021-03-04 DIAGNOSIS — C15.5 CANCER OF DISTAL THIRD OF ESOPHAGUS (H): ICD-10-CM

## 2021-03-04 DIAGNOSIS — I95.1 ORTHOSTATIC HYPOTENSION: Primary | ICD-10-CM

## 2021-03-04 DIAGNOSIS — C78.7 MALIGNANT NEOPLASM METASTATIC TO LIVER (H): ICD-10-CM

## 2021-03-04 PROCEDURE — 96360 HYDRATION IV INFUSION INIT: CPT

## 2021-03-04 PROCEDURE — 250N000011 HC RX IP 250 OP 636: Performed by: PHYSICIAN ASSISTANT

## 2021-03-04 PROCEDURE — 258N000003 HC RX IP 258 OP 636: Performed by: PHYSICIAN ASSISTANT

## 2021-03-04 RX ORDER — HEPARIN SODIUM,PORCINE 10 UNIT/ML
5 VIAL (ML) INTRAVENOUS
Status: CANCELLED | OUTPATIENT
Start: 2021-03-04

## 2021-03-04 RX ORDER — HEPARIN SODIUM (PORCINE) LOCK FLUSH IV SOLN 100 UNIT/ML 100 UNIT/ML
5 SOLUTION INTRAVENOUS
Status: CANCELLED | OUTPATIENT
Start: 2021-03-04

## 2021-03-04 RX ORDER — HEPARIN SODIUM (PORCINE) LOCK FLUSH IV SOLN 100 UNIT/ML 100 UNIT/ML
5 SOLUTION INTRAVENOUS
Status: DISCONTINUED | OUTPATIENT
Start: 2021-03-04 | End: 2021-03-04 | Stop reason: HOSPADM

## 2021-03-04 RX ADMIN — SODIUM CHLORIDE 1000 ML: 9 INJECTION, SOLUTION INTRAVENOUS at 08:18

## 2021-03-04 RX ADMIN — Medication 5 ML: at 09:32

## 2021-03-04 ASSESSMENT — PAIN SCALES - GENERAL: PAINLEVEL: NO PAIN (0)

## 2021-03-04 NOTE — PROGRESS NOTES
Infusion Nursing Note:  Reuben Padilla presents today for IV fluids.    Patient seen by provider today: No   present during visit today: Not Applicable.    Note: Patient arrived to infusion today, reporting that he is feeling a little better than yesterday. However, he only had two small bottles of water/gatorade yesterday, and threw up after he attempted to eat 2 bites of yogurt. Pt states he is taking zofran twice daily. He does not currently feel nauseous, and declined the need for antiemetics during infusion appointment. Pt was able to tolerate a small bottle of water and saltines during infusion appointment. Recommended that pt take compazine in between doses of zofran and try to pre-medicate for meals. Pt has no future appointments set up for IV fluids, and is adamant about returning home tomorrow. He would like to avoid coming back to [place]  all next week if possible. Care coordinator messaged to see if it can be arranged for pt to have IV fluids closer to home. Mansi Wetzel RN will follow up with patient this afternoon to discuss plan going forward.     Intravenous Access:  Implanted Port.    Treatment Conditions:  Not Applicable.    Post Infusion Assessment:  Patient tolerated infusion without incident.  Blood return noted pre and post infusion.  Site patent and intact, free from redness, edema or discomfort.  No evidence of extravasations.  Access discontinued per protocol.       Discharge Plan:   Patient declined prescription refills.  Discharge instructions reviewed with: Patient.  Patient and/or family verbalized understanding of discharge instructions and all questions answered.  AVS to patient via [employer].  Patient will return TBD.  Patient discharged in stable condition accompanied by: self.  Departure Mode: Ambulatory.    Mimi Miranda RN

## 2021-03-05 ENCOUNTER — PATIENT OUTREACH (OUTPATIENT)
Dept: ONCOLOGY | Facility: CLINIC | Age: 61
End: 2021-03-05

## 2021-03-05 ENCOUNTER — TELEPHONE (OUTPATIENT)
Dept: ONCOLOGY | Facility: CLINIC | Age: 61
End: 2021-03-05

## 2021-03-05 NOTE — PROGRESS NOTES
RN CARE COORDINATION NOTE      Outgoing: Called patient to notify him that [facility] is able to schedule him for IVF 3x week for the next 3 weeks. Lab orders are included Orders have been faxed and they will call him to schedule.     Will fax orders for Echo to the hospital [phone]    Scheduling will call him with the time for a virtual visit with Nicole next week,.     He reports just getting back to [place]. The drive was more taxing then he expected but he is happy to be home. Reassured him that the fatigue and difficulty eating is expected after the chemorads he just completed and it will begin to improve soon.     Encouraged him to call with any additional questions or concerns.       Mansi Wetzel MSN, RN, OCN  RN Care Coordinator  [facility]  [phone]

## 2021-03-05 NOTE — PATIENT INSTRUCTIONS
Orders for Reuben Padilla  60    1000 ml IV 0.9% sodium chloride infused over ONE hour,  THREE times a week starting on 3/8/2021 for THREE weeks.     Orthostatic BP and HR pre and post hydration    Vitals and weight at each visit.     Labs on 3/8/2021 CBC w/ diff, CMP  May repeat labs prn X3          [place] Sutherland PA-C  NPI [id number]

## 2021-03-05 NOTE — TELEPHONE ENCOUNTER
----- Message from Josefina Wetzel RN sent at 3/5/2021  1:14 PM CST -----  Regarding: URGENT FAX  [facility],    Please fax the following items to [facility] [phone] asap today:    Face Sheet  Order for Echo from Dr Dee.     Thank you!!  Mansi

## 2021-03-05 NOTE — TELEPHONE ENCOUNTER
The Echo orders were faxed. To [facility].@ [phone]    Sushma Tyler MA

## 2021-03-08 ENCOUNTER — TRANSFERRED RECORDS (OUTPATIENT)
Dept: HEALTH INFORMATION MANAGEMENT | Facility: CLINIC | Age: 61
End: 2021-03-08

## 2021-03-08 DIAGNOSIS — C15.5 CANCER OF DISTAL THIRD OF ESOPHAGUS (H): ICD-10-CM

## 2021-03-08 RX ORDER — ONDANSETRON 8 MG/1
8 TABLET, FILM COATED ORAL EVERY 8 HOURS PRN
Qty: 90 TABLET | Refills: 1 | Status: SHIPPED | OUTPATIENT
Start: 2021-03-08 | End: 2021-06-07

## 2021-03-08 RX ORDER — PANTOPRAZOLE SODIUM 40 MG/1
40 TABLET, DELAYED RELEASE ORAL DAILY
Qty: 30 TABLET | Refills: 3 | Status: SHIPPED | OUTPATIENT
Start: 2021-03-08 | End: 2021-06-07

## 2021-03-08 NOTE — TELEPHONE ENCOUNTER
FUTURE VISIT INFORMATION      FUTURE VISIT INFORMATION:    Date: 5/4/2021    Time: 9AM    [place] [facility] [place]  REFERRAL INFORMATION:    Referring provider:  [facility] Sutherland PA-C    Referring providers clinic:  [facility]     Reason for visit/diagnosis  Vertigo- Referred by [place] Sutherland PA-C in  ONCOLOGY ADULT     RECORDS REQUESTED FROM:         Clinic name Comments Records Status Imaging Status    [facility]  1/18/2021 referral from [place] Sutherland PA-C Epic     Imaging 1/14/2021 PET Epic PACS   [facility]    [address] [place] [address]    [phone]   9/10/2017 [facility]  re 1/21/21 - PACS   [facility] 4/19/2021 VNG (scheduled)  5/4/2021 Audio (scheduled) EPIC

## 2021-03-08 NOTE — TELEPHONE ENCOUNTER
Pt requesting transfer of pantoprazole back to [facility], routed to provider for approval.

## 2021-03-12 ENCOUNTER — VIRTUAL VISIT (OUTPATIENT)
Dept: ONCOLOGY | Facility: CLINIC | Age: 61
End: 2021-03-12
Attending: PHYSICIAN ASSISTANT
Payer: MEDICARE

## 2021-03-12 DIAGNOSIS — C15.5 CANCER OF DISTAL THIRD OF ESOPHAGUS (H): Primary | ICD-10-CM

## 2021-03-12 PROCEDURE — 99443 PR PHYSICIAN TELEPHONE EVALUATION 21-30 MIN: CPT | Mod: 95 | Performed by: PHYSICIAN ASSISTANT

## 2021-03-12 NOTE — PROGRESS NOTES
"Levi is a 60 year old who is being evaluated via a billable telephone visit.      What phone number would you like to be contacted at? [phone]    How would you like to obtain your AVS? 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 His tumor is positive for cytokeratin 20, negative for P63 and CK7, and HER2 is negative. PDL1 + weakly.  He started on FOLFOX (5FU/oxaliplatin) on 5/15/2017. He had a delay in treatment from 9/5-10/2/17 due to work related injury.     He had an excellent response by imaging throughout the summer 2017.    He a mixed response by imaging in late fall 2017.    In January 2018, he was switched to taxol and cyramza - had slight progression and neuropathy and clinical trial became available.        In March 2018, he was started on the [facility] Match Clinical Trial with crizotinib.  (MET amplification) He remained on crizotinib from March - July 2018.     August 2018, he was switched back to taxol/cramza.  In October 2018, he was switched to Keytruda (PD1 overexpressor).              -April 2019, his infusion was held for three weeks, and he was treated with prednisone and Enbrel for grade 3 arthralgias. Keytruda was resumed              -May 2020 he started Actemra (5/14).  This was initially WEEKLY and then in August- moved to QOW (secondary to fatigue and concerns for low WBC).                -August 2020 moved to q6 week Keytruda (still at the 200 mg " dose)              -November 2020 moved back to q3w Keytruda  CT 12/30/2020: progression of esophageal mass   1/14/21: PET showing growth of primary tumor only, discussion of radiation   1/28/21: started chemoradiation with weekly carbo/taxol and daily radiation     INTERVAL HISTORY:   Levi is pretty fatigued.  He is getting IVF locally 3 x week (MWF).  He feels the fluids are helping- afterwards he feels better.  He still feels he still tired, not doing a whole lot physically, but was able to do some minor snow removal.  He is eating better.  He was eating very little the last week of chemoradiation therapy.  He doing zofran-compazine-zofran daily to help with nausea. He is eating a breakfast bowl in infusion or a bowel of cereal at home.  Lunch is small and dinner is includes meat/fish and veggies.  He thinks he is getting in 1500 hugh.  He is drinking 32-48 oz daily- boost, gatorade, etc.  BM are fine, was constipated.      No vertigo or dizziness. He still feels a little off on his feet sometimes, but likely from fatigue and neuropathy.      Still has been off his coumadin.          ROS: 10 point ROS neg other than the symptoms noted above in the HPI.     PHYSICAL EXAMINATION  Voice is clear and strong. No audible stridor, wheezing, or respiratory distress. The remainder of PE was deferred due to PHE.      Labs:    local labs ANC 1.6, platelets 128.  Hgb 9.0  CMP WNL      IMPRESSION/PLAN:  1. Metastatic esophogeal adenocarcinoma. He has stable disease on Keytruda, for TWO years, which is great news.  Given ongoing arthralgias and fatigue and stable disease, his Keytruda was dropped back to every 6 weeks, still at the 200 mg dose.  He saw me in early November and his CT was mildly progressed at his GE junction tumor.  His dose was changed back to 200 mg every 3 weeks (verses 6 weeks). Levi noted worsening dysphagia, thus CT CAP was done on 12/30- showing ongoing primary tumor worsening with no evidence of  metastatic disease. Levi started concurrent chemoradiation with weekly carbo/taxol on 1/28.       He completed chemoradiation therapy 2 weeks ago.  He has been getting IVF 3 times a week locally.  He has ongoing moderate levels of fatigue but does feel like his appetite and ability to hydrate is improving.  For now he does feel like each time he receives fluid he is subjectively feels better.  We discussed in the upcoming weeks we can titrate this and decrease as he feels he is doing better.  I will set up to do a phone call visit with him again in another couple weeks to ensure improvement.  He will see Dr. Dee in 6 weeks with a PET scan        2. Esophagitis -   still eating solids, will continue PPI, can increase to BID.  Continue carafate TID, not needing analgesics     3. Immune related arthralgias- still thenar atrophy in his hands, but good strength. And actually improving strength and coordination.  No pain and sig less stiffness.  The steroids helped his hands and he is pleased this is getting better.  He is moving better than he has all year.      4. H/o PE 2018.   He has progressed through Xarelto in the past (didn't take it regularly).  He was then on lovenox but his insurance changed and this became unaffordable. He is now on Coumadin and labs drawn  at [facility] in [place] fax ([phone]).  He is monitored by our coumadin clinic.  -Platelets improved to 128K.  Discussed with Coumadin clinic will restart Coumadin     5. Baseline neuropathy is marginally worse in his legs with just numbness, none in his hands.  Continues on gabapentin BID, its primarily sensory in his feet only.  Has some balance issues on occasion, likely 2/2 to his weakness--this is stable.       6. Possible appendicitis on last CT? Reviewed, improved and currently no symptoms     7. Episode of tinnitus and vertigo-had audiogram and seeing balance clinic next week  -stop diuretic till post chemo  -Sees ENT  locally next week     8. Tachycardia - new afib.  He is on anticoagulation.  He is not in RVR.  He has a history of afib.  We discussed getting an echo, and monitoring closely.    The heart rate on his nursing notes has improved over the last week.  He has his echo on Tuesday.  He is restarting his Coumadin.  We discussed a possible beta-blocker if needed, will wait for echo results.         Nicole Sutherland PA-C  10 minutes spent on the date of the encounter doing chart review, review of outside records, review of test results, interpretation of tests, patient visit and documentation, in addition to 25 minutes spent on the phone with the patient.

## 2021-03-15 ENCOUNTER — PATIENT OUTREACH (OUTPATIENT)
Dept: ONCOLOGY | Facility: CLINIC | Age: 61
End: 2021-03-15

## 2021-03-15 ENCOUNTER — ONCOLOGY VISIT (OUTPATIENT)
Dept: RADIATION ONCOLOGY | Facility: CLINIC | Age: 61
End: 2021-03-15

## 2021-03-15 NOTE — PROGRESS NOTES
RN Care Coordination Note  Received call from local infusion center at [facility] requesting clarification for lab orders. Question is INR, CBC w diff, CMP should be drawn. Reviewed recent orders from Nicole Sutherland PA-C. They will draw labs today. Patient restarted Coumadin on Saturday 3/13. Patient follows with coumadin clinic for recommendations. Also spoke with patient regarding lab draw and follow-up with coumadin clinic. No additional questions at this time.                 Kitty Verde RN, BSN, OCN   RN Care Coordinator   [facility]

## 2021-03-18 NOTE — PROCEDURES
Radiotherapy Treatment Summary          Date of Report: March 15, 2021     PATIENT: KORI CHANEY  MEDICAL RECORD NO: [id number]  : 1960     DIAGNOSIS: C15.5 Malignant neoplasm of lower third of esophagus  INTENT OF RADIOTHERAPY: Palliation  PATHOLOGY: Squamous cell carcinoma                                  STAGE: T2N3M1  CONCURRENT SYSTEMIC THERAPY:  Weekly paclitaxel / caboplatin                   Details of the treatments summarized below are found in records kept in the Department of Radiation Oncology   at [facility].     Treatment Summary:  Radiation Oncology - Course: 1 Protocol:   Treatment Site    Current Dose Modality   From       To   Elapsed Days Fx.  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 Briefly, he was diagnosed in 2017 with metastatic disease to the liver, and was started on systemic   therapy. He had good response with resolution of intrahepatic lesions. More recently, he experienced   progressive dysphagia with imaging showing growth of primary tumor only while on Keytruda. He thus   underwent consolidative treatment to the primary as outlined above. The patient tolerated treatment as expected   with side effect of esophagitis, which was managed with Carafate. He also received IVF three times a week   towards the end of treatment for dehydration.     ED visits/hospitalizations: None     Acute Toxicity Profile by CTC v5.0: Grade 1 fatigue, grade 2 nausea, grade 2 mucositis     PAIN MANAGEMENT:  Did not require pain management.                             FOLLOW UP PLAN:  Follow-up with medical oncology as scheduled.                           Resident Physician: Ambika Luciano M.D.   Staff Physician: FERNANDO Costa M.D.  Physicist:  Willie Zavala                                        Radiation Oncology:  [facility] [address] [place] [address]

## 2021-03-29 ENCOUNTER — VIRTUAL VISIT (OUTPATIENT)
Dept: ONCOLOGY | Facility: CLINIC | Age: 61
End: 2021-03-29
Attending: INTERNAL MEDICINE
Payer: MEDICARE

## 2021-03-29 ENCOUNTER — DOCUMENTATION ONLY (OUTPATIENT)
Dept: ANTICOAGULATION | Facility: CLINIC | Age: 61
End: 2021-03-29

## 2021-03-29 DIAGNOSIS — C15.5 CANCER OF DISTAL THIRD OF ESOPHAGUS (H): Primary | ICD-10-CM

## 2021-03-29 DIAGNOSIS — I48.91 ATRIAL FIBRILLATION, UNSPECIFIED TYPE (H): ICD-10-CM

## 2021-03-29 PROCEDURE — 99443 PR PHYSICIAN TELEPHONE EVALUATION 21-30 MIN: CPT | Mod: 95 | Performed by: PHYSICIAN ASSISTANT

## 2021-03-29 PROCEDURE — 999N001193 HC VIDEO/TELEPHONE VISIT; NO CHARGE

## 2021-03-29 NOTE — PROGRESS NOTES
"Levi is a 60 year old who is being evaluated via a billable telephone visit.      What phone number would you like to be contacted at? [phone]  How would you like to obtain your AVS? 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 His tumor is positive for cytokeratin 20, negative for P63 and CK7, and HER2 is negative. PDL1 + weakly.  He started on FOLFOX (5FU/oxaliplatin) on 5/15/2017. He had a delay in treatment from 9/5-10/2/17 due to work related injury.     He had an excellent response by imaging throughout the summer 2017.    He a mixed response by imaging in late fall 2017.    In January 2018, he was switched to taxol and cyramza - had slight progression and neuropathy and clinical trial became available.        In March 2018, he was started on the NCI Match Clinical Trial with crizotinib.  (MET amplification) He remained on crizotinib from March - July 2018.     August 2018, he was switched back to taxol/cramza.  In October 2018, he was switched to Keytruda (PD1 overexpressor).              -April 2019, his infusion was held for three weeks, and he was treated with prednisone and Enbrel for grade 3 arthralgias. Keytruda was resumed              -May 2020 he started Actemra (5/14).  This was initially WEEKLY and then in August- moved to QOW (secondary to fatigue and concerns for low WBC).                -August 2020 moved to q6 week Keytruda (still at " the 200 mg dose)              -November 2020 moved back to q3w Keytruda  CT 12/30/2020: progression of esophageal mass   1/14/21: PET showing growth of primary tumor only, discussion of radiation   1/28/21: started chemoradiation with weekly carbo/taxol and daily radiation     INTERVAL HISTORY:   Burton recovery from chemoradiation is moving in the right direction.   Weight has been stable 330-340. He is eating solid foods but still sticking with softer foods. Lunch is small and dinner is includes meat/fish and veggies, about 3707-9071 hugh/day.  He stopped IVF last week.  Strength and stamina are slow to improve but moving in the right direction.   He stopped his daily anti nausea medications over a week ago.  He is drinking 48 oz daily- boost, gatorade, etc.      No vertigo or dizziness. He still feels a little off on his feet sometimes, but likely from fatigue and neuropathy.  Saw ENT locally. They wanted to restart the diuretic for Meniere's.      Levi restarted his Coumadin on 3/12.  Has not rechecked his INR.  Had some fresh blood on the tissue paper, but had been dealing with constipation.          ROS: 10 point ROS neg other than the symptoms noted above in the HPI.     PHYSICAL EXAMINATION  Voice is clear and strong. No audible stridor, wheezing, or respiratory distress. The remainder of PE was deferred due to PHE.       Labs:    local labs ANC 1.1, platelets WNL.  Hgb 10.5  CMP WNL        IMPRESSION/PLAN:  1. Metastatic esophogeal adenocarcinoma. He has stable disease on Keytruda, for TWO years, which is great news.  Given ongoing arthralgias and fatigue and stable disease, his Keytruda was dropped back to every 6 weeks, still at the 200 mg dose.  He saw me in early November and his CT was mildly progressed at his GE junction tumor.  His dose was changed back to 200 mg every 3 weeks (verses 6 weeks). Levi noted worsening dysphagia, thus CT CAP was done on 12/30- showing ongoing primary tumor worsening  with no evidence of metastatic disease. Levi started concurrent chemoradiation with weekly carbo/taxol on 1/28.       He completed chemoradiation therapy 4+ weeks ago.  He has been getting IVF 3 times a week locally, which has now stopped.  His fatigue and stamina is getting better.   He sees Dr Dee in 4 weeks with a PET scan        2. Esophagitis -   still eating solids, will continue PPI daily for now.  No further pain, just a fullness.     3. Immune related arthralgias- still thenar atrophy in his hands, but good strength. And actually improving strength and coordination.  No pain and sig less stiffness.  The steroids helped his hands and he is pleased this is getting better.  He is moving better than he has all year.      4. H/o PE 2018.   He has progressed through Xarelto in the past (didn't take it regularly).  He was then on lovenox but his insurance changed and this became unaffordable. He is now on Coumadin and labs drawn  at [facility] in [place] fax ([phone]).  He is monitored by our coumadin clinic. Reached out to have him get his INR checked. Had an episode of blood on the tissue after constipation.      5. Baseline neuropathy is marginally worse in his legs with just numbness, none in his hands.  Continues on gabapentin BID, its primarily sensory in his feet only.  Has some balance issues on occasion, likely 2/2 to his weakness--this is stable.       6. Possible appendicitis on last CT? Reviewed, improved and currently no symptoms     7. Episode of tinnitus and vertigo-had audiogram, seen at balance clinic.  Off diuretic given his recent dehydration.  Told Levi lets wait until next month before restarting it.      8. Tachycardia - new afib.  He is on anticoagulation.  He is not in RVR.  He has a history of afib.  His ECHO is looking OK.  His HR from all the RN notes the last two weeks his pulse is in the 90 range.  He will monitor BP and HR now 3-4 x week until he is back  in clinic.  At this time we don't need a BB, but will watch closely.           Phone call duration: 22 minutes

## 2021-03-29 NOTE — PROGRESS NOTES
Message from Nicole Sutherland.  Levi has noted some blood on tissue after using the bathroom. Writer requests that patient have an INR done today or tomorrow.  Patient again is reminded to call [facility] clinic if he doesn't hear from us.  INR on 3/15 was 1.2.  No INR done on 3/22.  Note that patient has been having some constipation.

## 2021-03-30 ENCOUNTER — PRE VISIT (OUTPATIENT)
Dept: OTOLARYNGOLOGY | Facility: CLINIC | Age: 61
End: 2021-03-30

## 2021-04-01 ENCOUNTER — PATIENT OUTREACH (OUTPATIENT)
Dept: ONCOLOGY | Facility: CLINIC | Age: 61
End: 2021-04-01

## 2021-04-01 ENCOUNTER — ANTICOAGULATION THERAPY VISIT (OUTPATIENT)
Dept: ANTICOAGULATION | Facility: CLINIC | Age: 61
End: 2021-04-01

## 2021-04-01 DIAGNOSIS — I26.99 OTHER ACUTE PULMONARY EMBOLISM WITHOUT ACUTE COR PULMONALE (H): ICD-10-CM

## 2021-04-01 DIAGNOSIS — Z86.711 HX OF PULMONARY EMBOLUS: ICD-10-CM

## 2021-04-01 DIAGNOSIS — I26.99 ACUTE PULMONARY EMBOLISM (H): ICD-10-CM

## 2021-04-01 LAB — INR PPP: 1.5 (ref 0.9–1.1)

## 2021-04-01 NOTE — PROGRESS NOTES
"Addendum 21  Monica calls today to request that new orders are faxed to [phone].  This will go straight to the infusion center and nursing staff will see them.  [facility]                  ANTICOAGULATION FOLLOW-UP CLINIC VISIT    Patient Name:  Reuben Padilla  Date:  2021  Contact Type:  Telephone    SUBJECTIVE:  Patient Findings     Comments:  Spoke to patient.  He states intermittently he has bright red blood on the paper after a BM.  Denies blood in stool.  When discussing increasing Warfarin dose, Leiv commented, \"so I can bleed some more?\"  Assessment by this writer is that the risks of PE outweigh the risk of bleeding.  Discussed with Pharmacist Teresa Persaud for patient's new Anticoagulation recommendation.        Clinical Outcomes     Comments:  Spoke to patient.  He states intermittently he has bright red blood on the paper after a BM.  Denies blood in stool.  When discussing increasing Warfarin dose, Levi commented, \"so I can bleed some more?\"  Assessment by this writer is that the risks of PE outweigh the risk of bleeding.  Discussed with Pharmacist Teresa Persaud for patient's new Anticoagulation recommendation.           OBJECTIVE    Recent labs: (last 7 days)     21   INR 1.5*       ASSESSMENT / PLAN  INR assessment SUB    Recheck INR In: 1 WEEK    INR Location Outside lab      Anticoagulation Summary  As of 2021    INR goal:  2.0-3.0   TTR:  60.9 % (1 y)   INR used for dosin.5 (2021)   Warfarin maintenance plan:  7.5 mg (5 mg x 1.5) every Sun, Thu; 5 mg (5 mg x 1) all other days   Full warfarin instructions:  7.5 mg every Sun, Thu; 5 mg all other days   Weekly warfarin total:  40 mg   Plan last modified:  Carlos Multani RN (2021)   Next INR check:  2021   Priority:  High   Target end date:  Indefinite    Indications    Acute pulmonary embolism (H) [I26.99]  Hx of pulmonary embolus [Z86.711]  Other acute pulmonary embolism without acute cor pulmonale (H) " [I26.99]             Anticoagulation Episode Summary     INR check location:      Preferred lab:      Send INR reminders to:  [facility]    Comments:  [facility] (P)[phone] (F)[phone], Takes Warfarin in the am      Anticoagulation Care Providers     Provider Role Specialty Phone number    Kadi Dee MD Referring Hematology & Oncology [phone]            See the Encounter Report to view Anticoagulation Flowsheet and Dosing Calendar (Go to Encounters tab in chart review, and find the Anticoagulation Therapy Visit)    INR/CFX/F2 RESULT:INR result is 1.5 per Care Everywhere    ASSESSMENT:See patient findings. Patient has had blood on the paper while wiping.  Per notes has been constipated.  Reviewed Cancer treatment and infusions with Pharmacist.    DOSING ADJUSTMENT:7.5mg on Sun & Thurs, 5mg all other days.    NEXT INR/FACTOR X OR FACTOR II:4/8    PROTOCOL FOLLOWED:goal 2-3  Recent restart of Warfarin on 3/12.  Per patient he has not missed any doses.  Confirmed he has been taking 5mg each day.    Carlos Multani RN

## 2021-04-01 NOTE — PROGRESS NOTES
RN CARE COORDINATION NOTE      Incoming:  Received call from Tamy at [facility]. Levi is at the clinic and they do not have orders for an INR. Provided verbal orders to draw the INR.  Writer spoke to Carlos in the coumadin clinic who agrees to fax new standing orders to the lab at [facility].    Mansi Wetzel MSN, RN, OCN  RN Care Coordinator  [facility]  [phone]

## 2021-04-08 ENCOUNTER — TELEPHONE (OUTPATIENT)
Dept: ANTICOAGULATION | Facility: CLINIC | Age: 61
End: 2021-04-08

## 2021-04-08 ENCOUNTER — PATIENT OUTREACH (OUTPATIENT)
Dept: ONCOLOGY | Facility: CLINIC | Age: 61
End: 2021-04-08

## 2021-04-08 DIAGNOSIS — Z86.711 HX OF PULMONARY EMBOLUS: ICD-10-CM

## 2021-04-08 DIAGNOSIS — I26.99 ACUTE PULMONARY EMBOLISM (H): ICD-10-CM

## 2021-04-08 DIAGNOSIS — I26.99 OTHER ACUTE PULMONARY EMBOLISM WITHOUT ACUTE COR PULMONALE (H): ICD-10-CM

## 2021-04-08 NOTE — PROGRESS NOTES
RN CARE COORDINATION NOTE    Spoke to nurse from [facility], patient is schedule for an INR today and they need orders.   Provided verbal order for today's lab and provided the direct contact information for the  clinic.     Spoke to  clinic who will send updated standing orders to [facility] fax at [phone]      Mansi Wetzel MSN, RN, OCN  RN Care Coordinator  [facility]  [phone]

## 2021-04-08 NOTE — TELEPHONE ENCOUNTER
Pt calls reporting that since there were no INR standing lab order today he will not be getting an INR. Pt expresses his anger about this not happening and that he requested this to be done last week. Writer explained that we were aware of this not being done and did fax a standing order to [facility] at Fax [phone]. Pt reports that he is tired of being on Coumadin and doesn't like being on this. Writer explained that this should be communicated with Dr. Dee and see if there is something else he can go on. Pt communicated understanding and reports he will get an INR either Friday or Monday depending on his schedule. Writer called [facility] and spoke with lab who reports they did receive our standing order.

## 2021-04-09 ENCOUNTER — ANTICOAGULATION THERAPY VISIT (OUTPATIENT)
Dept: ANTICOAGULATION | Facility: CLINIC | Age: 61
End: 2021-04-09

## 2021-04-09 DIAGNOSIS — Z86.711 HX OF PULMONARY EMBOLUS: ICD-10-CM

## 2021-04-09 DIAGNOSIS — I26.99 ACUTE PULMONARY EMBOLISM (H): ICD-10-CM

## 2021-04-09 DIAGNOSIS — I26.99 OTHER ACUTE PULMONARY EMBOLISM WITHOUT ACUTE COR PULMONALE (H): ICD-10-CM

## 2021-04-09 LAB — INR PPP: 1.9 (ref 0.9–1.1)

## 2021-04-09 NOTE — PROGRESS NOTES
ANTICOAGULATION FOLLOW-UP CLINIC VISIT    Patient Name:  Reuben Padilla  Date:  2021  Contact Type:  Telephone    SUBJECTIVE:  Patient Findings     Comments:  Spoke with patient. Gave them their lab results and new warfarin recommendation.  No changes in health, medication, or diet. No missed doses, no falls. Denies changes to bleeding discussed in previous encounter. Recommended recheck in 1-2 weeks per protocol-patient has a previous scheduled appt .           Clinical Outcomes     Negatives:  Major bleeding event, Thromboembolic event, Anticoagulation-related hospital admission, Anticoagulation-related ED visit, Anticoagulation-related fatality    Comments:  Spoke with patient. Gave them their lab results and new warfarin recommendation.  No changes in health, medication, or diet. No missed doses, no falls. Denies changes to bleeding discussed in previous encounter. Recommended recheck in 1-2 weeks per protocol-patient has a previous scheduled appt .              OBJECTIVE    Recent labs: (last 7 days)     21   INR 1.9*       ASSESSMENT / PLAN  INR assessment SUB    Recheck INR In: 2 WEEKS    INR Location Clinic      Anticoagulation Summary  As of 2021    INR goal:  2.0-3.0   TTR:  58.7 % (1 y)   INR used for dosin.9 (2021)   Warfarin maintenance plan:  7.5 mg (5 mg x 1.5) every Sun, Thu; 5 mg (5 mg x 1) all other days   Full warfarin instructions:  7.5 mg every Sun, Thu; 5 mg all other days   Weekly warfarin total:  40 mg   No change documented:  Genesis Figueroa RN   Plan last modified:  Carlos Multani RN (2021)   Next INR check:  2021   Priority:  High   Target end date:  Indefinite    Indications    Acute pulmonary embolism (H) [I26.99]  Hx of pulmonary embolus [Z86.711]  Other acute pulmonary embolism without acute cor pulmonale (H) [I26.99]             Anticoagulation Episode Summary     INR check location:      Preferred lab:      Send INR reminders to:  [facility]    Comments:  [facility] Ph [phone] Fax [phone] (standing order faxed 4/8/21)Takes Warfarin in the am      Anticoagulation Care Providers     Provider Role Specialty Phone number    Kadi Dee MD Referring Hematology & Oncology [phone]            See the Encounter Report to view Anticoagulation Flowsheet and Dosing Calendar (Go to Encounters tab in chart review, and find the Anticoagulation Therapy Visit)    INR/CFX/F2 Result: 1.9  Goal Range: 2-3  Assessment: negative for changes  Dosing Adjustment: none made  Next INR/CFX/F2: little over two weeks with previously scheduled appt  Protocol Followed: 2-3    ISABEL GONZALEZ RN

## 2021-04-15 ENCOUNTER — TELEPHONE (OUTPATIENT)
Dept: AUDIOLOGY | Facility: CLINIC | Age: 61
End: 2021-04-15

## 2021-04-15 NOTE — TELEPHONE ENCOUNTER
"LVM.    \"I m calling from the Audiology and Balance Testing department at the . This is just a call to remind you of your upcoming Balance Testing appointment on 4/19, and to see if you have any questions or concerns regarding the balance testing you'll be doing. You should have received an itinerary via mail or via [employer], if you are active, that goes over what to expect and explains the dos and don ts both 48 hours before, and the day of. There is a list of medications for you to review on the itinerary that we would like you to stop taking beforehand. If you didn t receive the itinerary or you still have questions, please give our clinic a call at [phone]. Otherwise, we will see you on 4/19 starting at 9:30AM.\"      Please send encounter if patient would like to reschedule.      BALANCE TESTING  You are scheduled for 1 or more of the following tests:  VNG (Videonystagmography). You will wear goggles with small cameras. These record small eye movements as you change position. This test takes 1 to 1 1/2 hours.     Rotational Chair. You will sit a chair that has a motor. The room will be dark. You will wear goggles with a camera while the chair rocks slowly from side to side. This test takes 20 to 30 minutes.     CDP (Computerized Dynamic Posturography). You will stand on a platform with your eyes open or closed. It will be unsteady at times. This will tell us how your balance systems work together and how well you sense the ground under your feet. This test takes 30 minutes.     Why am I having these tests?  These are tests for your inner ear. They may help us find out why you feel dizzy or off balance.     How do I prepare?  Below is a list of things that can affect test results. Do NOT take these for 48 hours before your tests or we will need to reschedule. We want to make sure your test results are correct. Ask your doctor if you have questions about stopping any medicine.     48 hours before the " tests:  Stop drinking alcohol (beer, wine, liquor).  Do NOT take Amitriptyline.  Do NOT take medicines for:  Allergy or colds. Examples: Benadryl (diphenhydramine), Allegra (fexofenadine), Zyrtec (cetirizine) and any over-the-counter  medicine that may cause drowsiness.  Anti-anxiety, unless you have been on them every day for the past 8 weeks or more. Examples: Xanax (alprazolam), Klonopin  (clonazepam), Valium (diazepam), Ativan (lorazepam).  Dizziness and nausea. Examples: Antivert/Bonine/Dramamine Less-Drowsy Formula (meclizine), Dramamine (dimenhydrinate),  Trans-derm Scop (Scopolamine), Compazine (prochlorperazine), Phenergan (promethazine).  Sleeping. This includes sleeping pills, sedatives, tranquilizers, muscle relaxants and narcotics.  It is okay to take medicines for diabetes, heart, blood pressure, seizures, thyroid or an ongoing condition.     The day of the tests:    Please have a  with you.    Do not have caffeine (coffee, soda, chocolate, energy drinks).    Do not smoke or have nicotine for at least 4 hours before the tests. This includes tobacco, e-cigarettes and nicotine gum.    Do not eat 2 to 3 hours before the tests, unless you have diabetes. Then, you should follow your usual diet.    Do not wear any make-up or lotions around your eyes or eyelids.    If you wear contact lenses, be prepared to take them out for testing; or wear your glasses.    If you take the anti-nausea medicine Zofran (ondansetron), do not take it on the day of your test. You may bring it with you to take after your tests.     Who should I call if I have questions?  If you have any questions, please call the Balance Center at [facility]:  [phone]

## 2021-04-15 NOTE — TELEPHONE ENCOUNTER
[facility] Call Center    Phone Message    May a detailed message be left on voicemail: yes     Reason for Call: Other:   Pt is returning Kailey's ph call. Pt states that he has questions about the list of medications that pt should stop. Pt states that he needs his sleep medication otherwise he will not sleep.  Please call pt back to address this.     Please also clarify the type of testing that pt will have. Pt wants to know which of the testing pt will be completing.     Please try pt's phone twice before leaving a message.     Action Taken: Other:  aud    Travel Screening: Not Applicable

## 2021-04-16 NOTE — TELEPHONE ENCOUNTER
Returned patients call regarding medications and testing. Recommended not taking Ambien for 48 hours prior to the test if possible. Explained that test results will not be acurrate without a 48 hour test prep. Patient expressed understanding, but says it will be very difficult to not take his sleep medication.    Deep Stevens.  Licensed Audiologist  [place] # [id number]

## 2021-04-19 ENCOUNTER — OFFICE VISIT (OUTPATIENT)
Dept: AUDIOLOGY | Facility: CLINIC | Age: 61
End: 2021-04-19
Attending: OTOLARYNGOLOGY
Payer: MEDICARE

## 2021-04-19 ENCOUNTER — OFFICE VISIT (OUTPATIENT)
Dept: ORTHOPEDICS | Facility: CLINIC | Age: 61
End: 2021-04-19
Payer: MEDICARE

## 2021-04-19 DIAGNOSIS — I73.89 OTHER SPECIFIED PERIPHERAL VASCULAR DISEASES (H): ICD-10-CM

## 2021-04-19 DIAGNOSIS — B35.3 TINEA PEDIS OF BOTH FEET: Primary | ICD-10-CM

## 2021-04-19 DIAGNOSIS — R42 DIZZINESS AND GIDDINESS: Primary | ICD-10-CM

## 2021-04-19 DIAGNOSIS — I83.893 VARICOSE VEINS OF BOTH LOWER EXTREMITIES WITH COMPLICATIONS: ICD-10-CM

## 2021-04-19 DIAGNOSIS — Z89.9 S/P AMPUTATION: ICD-10-CM

## 2021-04-19 DIAGNOSIS — B35.1 OM (ONYCHOMYCOSIS): ICD-10-CM

## 2021-04-19 PROCEDURE — 92537 CALORIC VSTBLR TEST W/REC: CPT | Performed by: AUDIOLOGIST

## 2021-04-19 PROCEDURE — 92542 POSITIONAL NYSTAGMUS TEST: CPT | Mod: 59 | Performed by: AUDIOLOGIST

## 2021-04-19 PROCEDURE — 92545 OSCILLATING TRACKING TEST: CPT | Mod: 59 | Performed by: AUDIOLOGIST

## 2021-04-19 PROCEDURE — 92567 TYMPANOMETRY: CPT | Performed by: AUDIOLOGIST

## 2021-04-19 PROCEDURE — 99213 OFFICE O/P EST LOW 20 MIN: CPT | Mod: 25 | Performed by: PODIATRIST

## 2021-04-19 PROCEDURE — 11721 DEBRIDE NAIL 6 OR MORE: CPT | Performed by: PODIATRIST

## 2021-04-19 PROCEDURE — 92541 SPONTANEOUS NYSTAGMUS TEST: CPT | Performed by: AUDIOLOGIST

## 2021-04-19 NOTE — NURSING NOTE
Reason For Visit:   Chief Complaint   Patient presents with     Follow Up     9 week follow up.        Pain Assessment  Patient Currently in Pain: Yes  Primary Pain Location: Foot(Bilateral)        Allergies   Allergen Reactions     Oksana Márquez LPN

## 2021-04-19 NOTE — PROGRESS NOTES
Chief Complaint   Patient presents with     Follow Up     9 week follow up.             HPI: Reuben is a 60 year old male who presents today for further evaluation for mycotic nails and orthotics. He has been using the Loprox. Nails are elongated and thick. He does have neuropathy.     Review of Systems: No n/v/d/f/c/ns/sob/cp    PMH:   Past Medical History:   Diagnosis Date     Arrhythmia      Cancer (H)     esophageal     Glaucoma      Hypertension      Paroxysmal A-fib (H)      Tubular adenoma 02/2017       PSxH:   Past Surgical History:   Procedure Laterality Date     AMPUTATION      toe     ARTHROSCOPY KNEE       bunion surgery       CHOLECYSTECTOMY       COLONOSCOPY  02/2017    remove 2 polyps     ESOPHAGOSCOPY, GASTROSCOPY, DUODENOSCOPY (EGD), COMBINED N/A 10/10/2018    Procedure: COMBINED ESOPHAGOSCOPY, GASTROSCOPY, DUODENOSCOPY (EGD);  Esophagogastroduodenoscopy ;  Surgeon: Leonel Joel MD;  Location: [facility] OR     INSERT PORT VASCULAR ACCESS Right 5/9/2017    Procedure: INSERT PORT VASCULAR ACCESS;  Single Lumen Chest Power Port;  Surgeon: Jasiel Hu PA-C;  Location: [facility]       Allergies: Penicillin g    SH:   Social History     Socioeconomic History     Marital status: Single     Spouse name: Not on file     Number of children: Not on file     Years of education: Not on file     Highest education level: Not on file   Occupational History     Not on file   Social Needs     Financial resource strain: Not on file     Food insecurity     Worry: Not on file     Inability: Not on file     Transportation needs     Medical: Not on file     Non-medical: Not on file   Tobacco Use     Smoking status: Former Smoker     Packs/day: 1.00     Years: 20.00     Pack years: 20.00     Types: Cigarettes     Quit date: 1/1/2006     Years since quitting: 15.3     Smokeless tobacco: Never Used   Substance and Sexual Activity     Alcohol use: Yes     Comment: occasional     Drug use: No     Comment: h/o  over 30 years ago     Sexual activity: Not on file   Lifestyle     Physical activity     Days per week: Not on file     Minutes per session: Not on file     Stress: Not on file   Relationships     Social connections     Talks on phone: Not on file     Gets together: Not on file     Attends Confucianism service: Not on file     Active member of club or organization: Not on file     Attends meetings of clubs or organizations: Not on file     Relationship status: Not on file     Intimate partner violence     Fear of current or ex partner: Not on file     Emotionally abused: Not on file     Physically abused: Not on file     Forced sexual activity: Not on file   Other Topics Concern     Parent/sibling w/ CABG, MI or angioplasty before 65F 55M? Not Asked   Social History Narrative     Not on file       FH: No family history on file.    Objective:  Data Unavailable Data Unavailable Data Unavailable Data Unavailable Data Unavailable 0 lbs 0 oz    PT and DP pulses are 2/4 bilaterally. CRT is instant. Diminished pedal hair. Varicosities noted BL.   Gross sensation is diminished bilaterally.  Protective sensation is diminished as tested with a 5.07G monofilament.  Equinus is noted bilaterally. No pain with active or passive ROM of the ankle, MTJ, 1st ray, or halluces bilaterally,.  No pain noted across the foot.  Right hallux amputation noted.  With ambulation he does have slight forefoot varus at the mid stance.  Nails mycotic bilaterally.  No open lesions are noted. Vesicular rash on a red base on BL feet.     Assessment: Levi is a 60-year-old with chemotherapy-induced neuropathy and right hallux amputation.  He would like a pair of inserts molded for him today.  Onychomycosis  PVD  Tinea pedis.     Plan:  - Pt seen and evaluated.  - Cont with orthotics.   - Loprox topically for the tinea.   - Nails debrided x 8  - See again in 9 - 10 weeks.

## 2021-04-19 NOTE — PROGRESS NOTES
"AUDIOLOGY REPORT-BALANCE ASSESSMENT    SUBJECTIVE: Reuben Padilla, 60 year old, was seen in Audiology at the [facility] on 4/19/2021, for videonystagmography (VNG), referred by Rick Nissen, M.D. 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 The patient underwent chemotherapy treatment previously and is currently again undergoing chemotherapy and radiation treatment (5 days per week). The patient reports that he was diagnosed with Meniere's disease approximately 25 years ago but that he had been mostly asymptomatic until December 2020 when he reports he suffered a vertigo attack. The patient reports that he initially noted \"plugging\" of the right ear and decreased hearing in the right ear and that a few days later he was going to stand from his recliner (that uses a \"lift\" to bring him to standing position) and he was struck by vertigo and nausea that lasted 1-2 hours. The patient reports that he experienced a second episode of vertigo approximately 4-5 weeks later while getting up from his recliner with that episode lasting approximately 5 hours, accompanied by nausea and diarrhea. The patient reports that he cannot confirm if he used to have \"ear plugging\" and decreased hearing prior to his vertigo attacks years ago, but he does believe that the vertigo is more severe presently compared to in the past. After the second attack the patient was prescribed a diuretic which he is still currently taking, and also a medication that he is to take when he starts to feel the onset of vertigo but he reports he has not had to take this medication yet as he has not experienced another episode of dizziness.     The patient reports the presence of constant bilateral ringing " "tinnitus that fluctuates in severity present for the past couple of months and episodic right aural fullness that has been present since the onset of tinnitus. The patient reports that he was sent to an appointment with a Physical Therapist to help with tinnitus, but after one session his Physician discontinued the therapy. The patient reports that when the right-ear hearing is not fluctuating, the right ear hearing is still slightly poorer than the left ear hearing and he confirms that the left-ear hearing does not seem to fluctuate. There are no completed hearing evaluations that are available for review but the patient will have testing prior to meeting with ear, nose and throat. The patient also confirms that the right aural fullness and increase in severity of constant tinnitus do not seem to correlate with the onset of vertigo.     The patient reports a history of chronic ear infections in childhood and in to his mid-twenties that he suspects was due to a hobby of swimming and scuba-diving. The patient reports that the ear infections were treated with antibiotics and the patient confirms that he has never had PE tubes placed or undergone any ear surgeries previously. The patient reports a diagnosis of a fibrillation but denies any medications or treatment for this. The patient reports an allergy to penicillin. The patient reports that his brother seems to have developed hearing loss but that he does have a history of noise exposure. The patient also reports that his sister recently \"fell and hit the floor twice without warning\", causing a concussion; he is unsure if his sister experienced dizziness or vertigo prior to or during the falls. The patient reports that he does not consume caffeine and that he rarely drinks alcohol (approximately one drink every three weeks). He also reports that he does not add salt to any of his foods. The patient denies history of head trauma, noise exposure, jaw issues, history " of migraines, dizziness with loud sounds, autophony, or noises with eye blinks or eye movements. The patient is recently avoiding exertion and so he is unsure if this would cause dizziness.      OBJECTIVE:  Abuse Screening:  Do you feel unsafe at home or work/school? No  Do you feel threatened by someone? No  Does anyone try to keep you from having contact with others, or doing things outside of your home? No  Physical signs of abuse present? No    Dizziness Handicap Inventory (DHI): Not completed    Videonystagmography (VNG) testing:  Prescreening:  Tympanograms: normal eardrum mobility bilaterally. Note: this test is completed to determine the status of the middle ear before irrigations are completed.  Ocular range of motion and ocular counter roll: Normal  Cross/cover:Normal  Head Thrust: Negative     Nystagmus Tests:  Gaze-Horizontal with fixation:   Center: Normal   Right: Normal   Left: Normal  Gaze-Vertical with fixation:   Up: Normal   Down: Normal  Gaze with fixation denied   Center: Normal   Right: Normal   Left: Normal   Up: Normal  High Frequency Headshake:   Horizontal: Negative.  No nystagmus or dizziness   Vertical: Negative.  No nystagmus or dizziness    [place]-Hallpike Head Right: Negative for PC-BPPV; no nystagmus or symptoms    [place]-Hallpike Head Left:  Negative for PC-BPPV; no nystagmus or symptoms     Roll Test Head Right: Negative for HC-BPPV; no nystagmus or symptoms   Roll Test Head Left: Negative for HC-BPPV; no nystagmus or symptoms     Positional Testing:  Positionals: Supine: Normal  Positionals: Body Right: Normal  Positionals: Body Left: Abnormal: 3 deg/sec left beating nystagmus with 4 deg/sec down beating component initially.  Down beating nystagmus fatigues after 15 seconds.  Left beating nystagmus does not suppress with fixation  Positionals: Pre-Caloric: Normal    Oculomotor Tests:  Saccades: Abnormal velocities  Repeat Saccades: Abnormal velocities  Anti-saccades: Normal; patient able  to perform task  Pursuit: Abnormal: Poor morphology; choppy (saccadic) pursuit  Repeat Pursuit: Abnormal: Poor morphology; choppy (saccadic) pursuit    Calorics :  (Tested at 44 degrees and 30 degrees Celsius for 30 seconds for warm and cool water, respectively):  Right Warm Eye Speed: 4 degrees per second right beating  Left Warm Eye Speed: 19 degrees per second left beating  Right Cool Eye Speed: 5 degrees per second left beating  Left Cool Eye Speed: 12 degrees per second right beating  Difference between ear: 55% right hypofunction. (Greater than 25% considered clinically significant.)  Fixation Index: 0.08; Normal  Overall caloric test: Abnormal: Right hypofunction found    ASSESSMENT:  1. Indications of central vestibular system involvement noted on today's exam were as follows:   - Abnormal Saccade and Pursuit testing- repeatable  - Non-fixating left and downbeating nystagmus during Body Left Positional testing    2. Indications of peripheral vestibular system involvement noted on today's exam were as follows:   - Right hypofunction found on Calorics    PLAN:  Follow-up with Dr. Nissen for medical management.  Please call this clinic at [phone] with questions regarding these results or recommendations.       Deep Stevens.  Licensed Audiologist  [place] # [id number]

## 2021-04-19 NOTE — LETTER
4/19/2021         RE: Reuben Padilla  [address] [address]        Dear Colleague,    Thank you for referring your patient, Reuben Padilla, to the [facility] [place]. Please see a copy of my visit note below.    Chief Complaint   Patient presents with     Follow Up     9 week follow up.             HPI: Reuben is a 60 year old male who presents today for further evaluation for mycotic nails and orthotics. He has been using the Loprox. Nails are elongated and thick. He does have neuropathy.     Review of Systems: No n/v/d/f/c/ns/sob/cp    PMH:   Past Medical History:   Diagnosis Date     Arrhythmia      Cancer (H)     esophageal     Glaucoma      Hypertension      Paroxysmal A-fib (H)      Tubular adenoma 02/2017       PSxH:   Past Surgical History:   Procedure Laterality Date     AMPUTATION      toe     ARTHROSCOPY KNEE       bunion surgery       CHOLECYSTECTOMY       COLONOSCOPY  02/2017    remove 2 polyps     ESOPHAGOSCOPY, GASTROSCOPY, DUODENOSCOPY (EGD), COMBINED N/A 10/10/2018    Procedure: COMBINED ESOPHAGOSCOPY, GASTROSCOPY, DUODENOSCOPY (EGD);  Esophagogastroduodenoscopy ;  Surgeon: Leonel Joel MD;  Location: [facility] OR     INSERT PORT VASCULAR ACCESS Right 5/9/2017    Procedure: INSERT PORT VASCULAR ACCESS;  Single Lumen Chest Power Port;  Surgeon: Jasiel Hu PA-C;  Location: [facility]       Allergies: Penicillin g    SH:   Social History     Socioeconomic History     Marital status: Single     Spouse name: Not on file     Number of children: Not on file     Years of education: Not on file     Highest education level: Not on file   Occupational History     Not on file   Social Needs     Financial resource strain: Not on file     Food insecurity     Worry: Not on file     Inability: Not on file     Transportation needs     Medical: Not on file     Non-medical: Not on file   Tobacco Use     Smoking status: Former Smoker     Packs/day:  1.00     Years: 20.00     Pack years: 20.00     Types: Cigarettes     Quit date: 1/1/2006     Years since quitting: 15.3     Smokeless tobacco: Never Used   Substance and Sexual Activity     Alcohol use: Yes     Comment: occasional     Drug use: No     Comment: h/o over 30 years ago     Sexual activity: Not on file   Lifestyle     Physical activity     Days per week: Not on file     Minutes per session: Not on file     Stress: Not on file   Relationships     Social connections     Talks on phone: Not on file     Gets together: Not on file     Attends Jehovah's witness service: Not on file     Active member of club or organization: Not on file     Attends meetings of clubs or organizations: Not on file     Relationship status: Not on file     Intimate partner violence     Fear of current or ex partner: Not on file     Emotionally abused: Not on file     Physically abused: Not on file     Forced sexual activity: Not on file   Other Topics Concern     Parent/sibling w/ CABG, MI or angioplasty before 65F 55M? Not Asked   Social History Narrative     Not on file       FH: No family history on file.    Objective:  Data Unavailable Data Unavailable Data Unavailable Data Unavailable Data Unavailable 0 lbs 0 oz    PT and DP pulses are 2/4 bilaterally. CRT is instant. Diminished pedal hair. Varicosities noted BL.   Gross sensation is diminished bilaterally.  Protective sensation is diminished as tested with a 5.07G monofilament.  Equinus is noted bilaterally. No pain with active or passive ROM of the ankle, MTJ, 1st ray, or halluces bilaterally,.  No pain noted across the foot.  Right hallux amputation noted.  With ambulation he does have slight forefoot varus at the mid stance.  Nails mycotic bilaterally.  No open lesions are noted. Vesicular rash on a red base on BL feet.     Assessment: Levi is a 60-year-old with chemotherapy-induced neuropathy and right hallux amputation.  He would like a pair of inserts molded for him  today.  Onychomycosis  PVD  Tinea pedis.     Plan:  - Pt seen and evaluated.  - Cont with orthotics.   - Loprox topically for the tinea.   - Nails debrided x 8  - See again in 9 - 10 weeks.        Arnold Forbes, JOSEM

## 2021-04-21 NOTE — PATIENT INSTRUCTIONS
1. You were seen in the ENT Clinic today by Dr. Nissen.  If you have any questions or concerns after your appointment, please call   - Option 1: ENT Clinic: [phone]   - Option 2: Priti (Dr. Nissen's Nurse): [phone]    2.   Plan to return to clinic in 6 months without hearing test  Priti Enriquez LPN  [facility] - Otolaryngology

## 2021-04-26 ENCOUNTER — ONCOLOGY VISIT (OUTPATIENT)
Dept: ONCOLOGY | Facility: CLINIC | Age: 61
End: 2021-04-26
Attending: INTERNAL MEDICINE
Payer: MEDICARE

## 2021-04-26 ENCOUNTER — HOSPITAL ENCOUNTER (OUTPATIENT)
Dept: PET IMAGING | Facility: CLINIC | Age: 61
End: 2021-04-26
Attending: INTERNAL MEDICINE
Payer: MEDICARE

## 2021-04-26 ENCOUNTER — ANTICOAGULATION THERAPY VISIT (OUTPATIENT)
Dept: ANTICOAGULATION | Facility: CLINIC | Age: 61
End: 2021-04-26

## 2021-04-26 ENCOUNTER — PATIENT OUTREACH (OUTPATIENT)
Dept: ONCOLOGY | Facility: CLINIC | Age: 61
End: 2021-04-26

## 2021-04-26 VITALS
HEART RATE: 87 BPM | RESPIRATION RATE: 16 BRPM | HEIGHT: 78 IN | SYSTOLIC BLOOD PRESSURE: 112 MMHG | BODY MASS INDEX: 36.45 KG/M2 | WEIGHT: 315 LBS | OXYGEN SATURATION: 96 % | DIASTOLIC BLOOD PRESSURE: 82 MMHG | TEMPERATURE: 98 F

## 2021-04-26 DIAGNOSIS — I26.99 ACUTE PULMONARY EMBOLISM (H): ICD-10-CM

## 2021-04-26 DIAGNOSIS — M19.90 INFLAMMATORY ARTHRITIS: ICD-10-CM

## 2021-04-26 DIAGNOSIS — C15.5 CANCER OF DISTAL THIRD OF ESOPHAGUS (H): ICD-10-CM

## 2021-04-26 DIAGNOSIS — I26.99 OTHER ACUTE PULMONARY EMBOLISM WITHOUT ACUTE COR PULMONALE (H): ICD-10-CM

## 2021-04-26 DIAGNOSIS — C78.7 MALIGNANT NEOPLASM METASTATIC TO LIVER (H): ICD-10-CM

## 2021-04-26 DIAGNOSIS — C16.9 METASTASIS FROM GASTRIC CANCER (H): ICD-10-CM

## 2021-04-26 DIAGNOSIS — Z86.711 HX OF PULMONARY EMBOLUS: ICD-10-CM

## 2021-04-26 DIAGNOSIS — C15.5 CANCER OF DISTAL THIRD OF ESOPHAGUS (H): Primary | ICD-10-CM

## 2021-04-26 DIAGNOSIS — R00.0 TACHYCARDIA: ICD-10-CM

## 2021-04-26 DIAGNOSIS — M25.50 MULTIPLE JOINT PAIN: ICD-10-CM

## 2021-04-26 DIAGNOSIS — C79.9 METASTASIS FROM GASTRIC CANCER (H): ICD-10-CM

## 2021-04-26 DIAGNOSIS — E66.01 MORBID OBESITY (H): ICD-10-CM

## 2021-04-26 DIAGNOSIS — I48.91 ATRIAL FIBRILLATION, UNSPECIFIED TYPE (H): ICD-10-CM

## 2021-04-26 LAB
ALBUMIN SERPL-MCNC: 3.5 G/DL (ref 3.4–5)
ALP SERPL-CCNC: 85 U/L (ref 40–150)
ALT SERPL W P-5'-P-CCNC: 32 U/L (ref 0–70)
ANION GAP SERPL CALCULATED.3IONS-SCNC: 6 MMOL/L (ref 3–14)
AST SERPL W P-5'-P-CCNC: 22 U/L (ref 0–45)
BASOPHILS # BLD AUTO: 0 10E9/L (ref 0–0.2)
BASOPHILS NFR BLD AUTO: 0.6 %
BILIRUB SERPL-MCNC: 0.7 MG/DL (ref 0.2–1.3)
BUN SERPL-MCNC: 18 MG/DL (ref 7–30)
CALCIUM SERPL-MCNC: 9.2 MG/DL (ref 8.5–10.1)
CHLORIDE SERPL-SCNC: 106 MMOL/L (ref 94–109)
CO2 SERPL-SCNC: 26 MMOL/L (ref 20–32)
CREAT SERPL-MCNC: 1.11 MG/DL (ref 0.66–1.25)
DIFFERENTIAL METHOD BLD: ABNORMAL
EOSINOPHIL # BLD AUTO: 0.1 10E9/L (ref 0–0.7)
EOSINOPHIL NFR BLD AUTO: 1.8 %
ERYTHROCYTE [DISTWIDTH] IN BLOOD BY AUTOMATED COUNT: 12.7 % (ref 10–15)
GFR SERPL CREATININE-BSD FRML MDRD: 71 ML/MIN/{1.73_M2}
GLUCOSE SERPL-MCNC: 113 MG/DL (ref 70–99)
HCT VFR BLD AUTO: 41.5 % (ref 40–53)
HGB BLD-MCNC: 13.6 G/DL (ref 13.3–17.7)
IMM GRANULOCYTES # BLD: 0 10E9/L (ref 0–0.4)
IMM GRANULOCYTES NFR BLD: 0.3 %
INR PPP: 2.09 (ref 0.86–1.14)
LYMPHOCYTES # BLD AUTO: 0.6 10E9/L (ref 0.8–5.3)
LYMPHOCYTES NFR BLD AUTO: 17.3 %
MCH RBC QN AUTO: 29.8 PG (ref 26.5–33)
MCHC RBC AUTO-ENTMCNC: 32.8 G/DL (ref 31.5–36.5)
MCV RBC AUTO: 91 FL (ref 78–100)
MONOCYTES # BLD AUTO: 0.3 10E9/L (ref 0–1.3)
MONOCYTES NFR BLD AUTO: 9.5 %
NEUTROPHILS # BLD AUTO: 2.4 10E9/L (ref 1.6–8.3)
NEUTROPHILS NFR BLD AUTO: 70.5 %
NRBC # BLD AUTO: 0 10*3/UL
NRBC BLD AUTO-RTO: 0 /100
PLATELET # BLD AUTO: 133 10E9/L (ref 150–450)
POTASSIUM SERPL-SCNC: 3.5 MMOL/L (ref 3.4–5.3)
PROT SERPL-MCNC: 7.3 G/DL (ref 6.8–8.8)
RBC # BLD AUTO: 4.56 10E12/L (ref 4.4–5.9)
SODIUM SERPL-SCNC: 137 MMOL/L (ref 133–144)
TSH SERPL DL<=0.005 MIU/L-ACNC: 3.26 MU/L (ref 0.4–4)
WBC # BLD AUTO: 3.4 10E9/L (ref 4–11)

## 2021-04-26 PROCEDURE — A9552 F18 FDG: HCPCS | Performed by: INTERNAL MEDICINE

## 2021-04-26 PROCEDURE — 250N000011 HC RX IP 250 OP 636: Performed by: INTERNAL MEDICINE

## 2021-04-26 PROCEDURE — G1004 CDSM NDSC: HCPCS

## 2021-04-26 PROCEDURE — 80053 COMPREHEN METABOLIC PANEL: CPT | Performed by: INTERNAL MEDICINE

## 2021-04-26 PROCEDURE — 85610 PROTHROMBIN TIME: CPT | Performed by: INTERNAL MEDICINE

## 2021-04-26 PROCEDURE — 74177 CT ABD & PELVIS W/CONTRAST: CPT | Mod: 26

## 2021-04-26 PROCEDURE — 78813 PET IMAGE FULL BODY: CPT | Mod: 26

## 2021-04-26 PROCEDURE — 36592 COLLECT BLOOD FROM PICC: CPT | Performed by: INTERNAL MEDICINE

## 2021-04-26 PROCEDURE — G0463 HOSPITAL OUTPT CLINIC VISIT: HCPCS

## 2021-04-26 PROCEDURE — 85025 COMPLETE CBC W/AUTO DIFF WBC: CPT | Performed by: INTERNAL MEDICINE

## 2021-04-26 PROCEDURE — 78816 PET IMAGE W/CT FULL BODY: CPT | Mod: PS,MG

## 2021-04-26 PROCEDURE — 71260 CT THORAX DX C+: CPT | Mod: 26

## 2021-04-26 PROCEDURE — 84443 ASSAY THYROID STIM HORMONE: CPT | Performed by: INTERNAL MEDICINE

## 2021-04-26 PROCEDURE — 74177 CT ABD & PELVIS W/CONTRAST: CPT

## 2021-04-26 PROCEDURE — 99215 OFFICE O/P EST HI 40 MIN: CPT | Performed by: INTERNAL MEDICINE

## 2021-04-26 PROCEDURE — G0463 HOSPITAL OUTPT CLINIC VISIT: HCPCS | Mod: 25

## 2021-04-26 PROCEDURE — 343N000001 HC RX 343: Performed by: INTERNAL MEDICINE

## 2021-04-26 RX ORDER — IOPAMIDOL 755 MG/ML
45-135 INJECTION, SOLUTION INTRAVASCULAR ONCE
Status: COMPLETED | OUTPATIENT
Start: 2021-04-26 | End: 2021-04-26

## 2021-04-26 RX ADMIN — Medication 500 UNITS: at 09:05

## 2021-04-26 RX ADMIN — FLUDEOXYGLUCOSE F-18 17.88 MCI.: 500 INJECTION, SOLUTION INTRAVENOUS at 08:44

## 2021-04-26 RX ADMIN — IOPAMIDOL 135 ML: 755 INJECTION, SOLUTION INTRAVENOUS at 08:42

## 2021-04-26 ASSESSMENT — PAIN SCALES - GENERAL: PAINLEVEL: NO PAIN (0)

## 2021-04-26 ASSESSMENT — MIFFLIN-ST. JEOR: SCORE: 2488.53

## 2021-04-26 NOTE — PROGRESS NOTES
RN CARE COORDINATION NOTE      Met with patient and his son in clinic. Patient is doing well and happy with his scan results. No new concerns or needs at this time.       Mansi Wetzel MSN, RN, OCN  RN Care Coordinator  [facility]  [phone]

## 2021-04-26 NOTE — PROGRESS NOTES
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 His tumor is positive for cytokeratin 20, negative for P63 and CK7, and HER2 is negative. PDL1 + weakly.  He started on FOLFOX (5FU/oxaliplatin) on 5/15/2017. He had a delay in treatment from 9/5-10/2/17 due to work related injury.     He had an excellent response by imaging throughout the summer 2017.    He a mixed response by imaging in late fall 2017.    In January 2018, he was switched to taxol and cyramza - had slight progression and neuropathy and clinical trial became available.        In March 2018, he was started on the [facility] Match Clinical Trial with crizotinib.  (MET amplification) He remained on crizotinib from March - July 2018.     August 2018, he was switched back to taxol/cramza.  In October 2018, he was switched to Keytruda (PD1 overexpressor).              -April 2019, his infusion was held for three weeks, and he was treated with prednisone and Enbrel for grade 3 arthralgias. Keytruda was resumed              -May 2020 he started Actemra (5/14).  This was initially WEEKLY and then in August- moved to QOW (secondary to fatigue and concerns for low WBC).                -August 2020 moved to q6 week Keytruda (still at the 200 mg dose)              -November 2020 moved back to q3w Keytruda  CT 12/30/2020: progression of esophageal mass   1/14/21: PET showing growth of primary tumor only, discussion of radiation   1/28/21: started chemoradiation with weekly carbo/taxol and daily radiation.  Completed 3/3/21 received 5000 cGY     INTERVAL HISTORY:   Levi is feeling much better since completing his radiation.  He reports that he is eating a normal diet.  However, he knows he  "needs to have liquids with him.  Otherwise, he feels like things do not go quite go back down clearly.    His energy is improving.  He continues to have difficulty with neuropathy.  He reports some numbness and tingling from his knees on down.  He has been on 5 tablets of gabapentin daily.  He is unsure whether or not this is actually doing anything.    He previously has had issues with vertigo and Meniere's disease.  Overall, this is improved, although he continues to have some intermittent difficulties with hearing.  He has been seeing ENT and recently underwent some new evaluations.    No fevers or chills.  No chest pain or shortness of breath.  He has had no problems with bleeding.  He is here with his son today to discuss his PET CT scan.    His overall  strength is improved.  His biggest complaint is neuropathy and occasionally balance issues related to this.    He remains on Coumadin with his INR being managed at [facility]        He received his covid vaccines x 2.        ROS: 10 point ROS neg other than the symptoms noted above in the HPI.     PHYSICAL EXAMINATION  Wt Readings from Last 4 Encounters:   04/26/21 (!) 154.5 kg (340 lb 11.2 oz)   03/02/21 (!) 157.8 kg (347 lb 12.8 oz)   03/01/21 (!) 157.1 kg (346 lb 4.8 oz)   02/25/21 (!) 161.3 kg (355 lb 11.2 oz)     /82 (BP Location: Right arm, Patient Position: Chair, Cuff Size: Adult Large)   Pulse 87   Temp 98  F (36.7  C)   Resp 16   Ht 1.981 m (6' 5.99\")   Wt (!) 154.5 kg (340 lb 11.2 oz)   SpO2 96%   BMI 39.38 kg/m    Vital signs were reviewed.   Patient alert and oriented x3.   PERRLA. EOMI. No scleral icterus noted.   Neck exam: No palpable cervical, supraclavicular or axillary nodes bilaterally.   Heart:Mildly tachy, no murmur   Lungs: clear to auscultation bilaterally.  No crackles or wheezing.   Abd: positive bowel sounds in all four quadrants.  No tenderness to palpation.  No hepatomegaly.   Extremities: No lower extremity edema, " venous stasis discoloration to bilateral legs  Neuro: grossly intact. Absent reflexes from knees to below.  Mood and affect is stable.       Labs:        Lab Results   Component Value Date    WBC 3.4 04/26/2021     Lab Results   Component Value Date    RBC 4.56 04/26/2021     Lab Results   Component Value Date    HGB 13.6 04/26/2021     Lab Results   Component Value Date    HCT 41.5 04/26/2021     No components found for: MCT  Lab Results   Component Value Date    MCV 91 04/26/2021     Lab Results   Component Value Date    MCH 29.8 04/26/2021     Lab Results   Component Value Date    MCHC 32.8 04/26/2021     Lab Results   Component Value Date    RDW 12.7 04/26/2021     Lab Results   Component Value Date     04/26/2021       Recent Labs   Lab Test 04/26/21  0805 03/01/21  0914    142   POTASSIUM 3.5 3.6   CHLORIDE 106 109   CO2 26 25   ANIONGAP 6 8   * 101*   BUN 18 13   CR 1.11 1.09   NIDHI 9.2 8.5     Liver Function Studies -   Recent Labs   Lab Test 04/26/21  0805   PROTTOTAL 7.3   ALBUMIN 3.5   BILITOTAL 0.7   ALKPHOS 85   AST 22   ALT 32     Reviewed his labs above today and his PET scan scan personally with radiology today.    IMPRESSION/PLAN:  1. 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 He has stable disease on Keytruda, for TWO years, which is great news.  He is now s/p chemoradiation to the primary tumor.  He has had an excellent response to chemoradiation.  I reviewed his PET scan with him and advised that we continue to monitor him off of therapy for now.    He has occasional dysphagia.  I advised he be monitored for four more weeks; if persists, we could consider an EGD at that time.  Otherwise, we will plan for PET scan in 3 months.    2. Esophagitis -   still eating solids, will continue PPI, can increase to BID.  Continue carafate TID prn.       3. Immune related arthralgias- resolved off of Keytruda.     4. H/o PE 2018.   He has progressed through  Xarelto in the past (didn't take it regularly).  He was then on lovenox but his insurance changed and this became unaffordable. He is now on Coumadin and labs drawn  at [facility] in [place] fax ([phone]).  He is monitored by our coumadin clinic.     5. Baseline neuropathy is marginally worse in his legs with just numbness, none in his hands.  Continues on gabapentin BID, its primarily sensory in his feet only.  He is unsure if the gabapentin is doing anything.  Will plan to titrate off of gabapentin, and consider cymbalta in the future.       6. Possible appendicitis on last CT? Reviewed, improved and currently no symptoms.  Await final report of PET/CT.     7. Episode of tinnitus and vertigo-had audiogram and seeing balance clinic  - improved on diuretic.     8.  afib.  He is on anticoagulation.  He is not in RVR.  He has a history of afib.       40 minutes spent on the date of the encounter doing chart review, review of test results, interpretation of tests, patient visit and documentation       Kadi Dee MD

## 2021-04-26 NOTE — NURSING NOTE
"Oncology Rooming Note    April 26, 2021 11:47 AM   Reuben Padilla is a 60 year old male who presents for:    Chief Complaint   Patient presents with     Oncology Clinic Visit     P RETURN - ESOPHAGEAL CANCER     Initial Vitals: /82 (BP Location: Right arm, Patient Position: Chair, Cuff Size: Adult Large)   Pulse 87   Temp 98  F (36.7  C)   Resp 16   Ht 1.981 m (6' 5.99\")   Wt (!) 154.5 kg (340 lb 11.2 oz)   SpO2 96%   BMI 39.38 kg/m   Estimated body mass index is 39.38 kg/m  as calculated from the following:    Height as of this encounter: 1.981 m (6' 5.99\").    Weight as of this encounter: 154.5 kg (340 lb 11.2 oz). Body surface area is 2.92 meters squared.  No Pain (0) Comment: Data Unavailable   No LMP for male patient.  Allergies reviewed: Yes  Medications reviewed: Yes    Medications: Medication refills not needed today.  Pharmacy name entered into [employer]:    [facility] - [place], [place] - [address] [facility] - [facility] [address]    Clinical concerns: No new concerns. Leila was notified.      Haider Meraz LPN            "

## 2021-04-26 NOTE — PROGRESS NOTES
ANTICOAGULATION FOLLOW-UP CLINIC VISIT    Patient Name:  Reuben Padilla  Date:  2021  Contact Type:  Telephone    SUBJECTIVE:  Patient Findings     Comments:  Pt reports he will come in 3-4 weeks. Writer recommend pt to come in 3 weeks due to restarting Warfarin at the beginning of this month due to bleeding.         Clinical Outcomes     Negatives:  Major bleeding event, Thromboembolic event, Anticoagulation-related hospital admission, Anticoagulation-related ED visit, Anticoagulation-related fatality    Comments:  Pt reports he will come in 3-4 weeks. Writer recommend pt to come in 3 weeks due to restarting Warfarin at the beginning of this month due to bleeding.            OBJECTIVE    Recent labs: (last 7 days)     21  0805   INR 2.09*       ASSESSMENT / PLAN  INR assessment THER    Recheck INR In: 3 WEEKS    INR Location Clinic      Anticoagulation Summary  As of 2021    INR goal:  2.0-3.0   TTR:  56.2 % (1 y)   INR used for dosin.09 (2021)   Warfarin maintenance plan:  7.5 mg (5 mg x 1.5) every Sun, Thu; 5 mg (5 mg x 1) all other days   Full warfarin instructions:  7.5 mg every Sun, Thu; 5 mg all other days   Weekly warfarin total:  40 mg   No change documented:  Delfina Thomas RN   Plan last modified:  Carlos Multani RN (2021)   Next INR check:  2021   Priority:  Maintenance   Target end date:  Indefinite    Indications    Acute pulmonary embolism (H) [I26.99]  Hx of pulmonary embolus [Z86.711]  Other acute pulmonary embolism without acute cor pulmonale (H) [I26.99]             Anticoagulation Episode Summary     INR check location:      Preferred lab:      Send INR reminders to:  [facility]    Comments:  Labs [facility] [phone] Fax [phone] (standing order faxed 21)Takes Warfarin in the am      Anticoagulation Care Providers     Provider Role Specialty Phone number    Kadi Dee MD Referring Hematology & Oncology [phone]             See the Encounter Report to view Anticoagulation Flowsheet and Dosing Calendar (Go to Encounters tab in chart review, and find the Anticoagulation Therapy Visit)    Spoke with patient. Gave them their lab results and new warfarin recommendation.  No changes in health, medication, or diet. No missed doses, no falls. No signs or symptoms of bleed or clotting.     Delfina Thomas RN

## 2021-04-26 NOTE — LETTER
4/26/2021         RE: Reuben Padilla  [address] [facility] [address]        Dear Colleague,    Thank you for referring your patient, Reuben Padilla, to the [facility]. Please see a copy of my visit note below.        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 His tumor is positive for cytokeratin 20, negative for P63 and CK7, and HER2 is negative. PDL1 + weakly.  He started on FOLFOX (5FU/oxaliplatin) on 5/15/2017. He had a delay in treatment from 9/5-10/2/17 due to work related injury.     He had an excellent response by imaging throughout the summer 2017.    He a mixed response by imaging in late fall 2017.    In January 2018, he was switched to taxol and cyramza - had slight progression and neuropathy and clinical trial became available.        In March 2018, he was started on the NCI Match Clinical Trial with crizotinib.  (MET amplification) He remained on crizotinib from March - July 2018.     August 2018, he was switched back to taxol/cramza.  In October 2018, he was switched to Keytruda (PD1 overexpressor).              -April 2019, his infusion was held for three weeks, and he was treated with prednisone and Enbrel for grade 3 arthralgias. Keytruda was resumed              -May 2020 he started Actemra (5/14).  This was initially WEEKLY and then in August- moved to QOW (secondary to fatigue and concerns for low WBC).                -August 2020 moved to q6 week Keytruda (still at the 200 mg dose)              -November 2020 moved back to q3w Keytruda  CT 12/30/2020: progression of esophageal mass   1/14/21: PET showing growth of primary tumor only, discussion of radiation  "  1/28/21: started chemoradiation with weekly carbo/taxol and daily radiation.  Completed 3/3/21 received 5000 cGY     INTERVAL HISTORY:   Levi is feeling much better since completing his radiation.  He reports that he is eating a normal diet.  However, he knows he needs to have liquids with him.  Otherwise, he feels like things do not go quite go back down clearly.    His energy is improving.  He continues to have difficulty with neuropathy.  He reports some numbness and tingling from his knees on down.  He has been on 5 tablets of gabapentin daily.  He is unsure whether or not this is actually doing anything.    He previously has had issues with vertigo and Meniere's disease.  Overall, this is improved, although he continues to have some intermittent difficulties with hearing.  He has been seeing ENT and recently underwent some new evaluations.    No fevers or chills.  No chest pain or shortness of breath.  He has had no problems with bleeding.  He is here with his son today to discuss his PET CT scan.    His overall  strength is improved.  His biggest complaint is neuropathy and occasionally balance issues related to this.    He remains on Coumadin with his INR being managed at [facility]        He received his covid vaccines x 2.        ROS: 10 point ROS neg other than the symptoms noted above in the HPI.     PHYSICAL EXAMINATION  Wt Readings from Last 4 Encounters:   04/26/21 (!) 154.5 kg (340 lb 11.2 oz)   03/02/21 (!) 157.8 kg (347 lb 12.8 oz)   03/01/21 (!) 157.1 kg (346 lb 4.8 oz)   02/25/21 (!) 161.3 kg (355 lb 11.2 oz)     /82 (BP Location: Right arm, Patient Position: Chair, Cuff Size: Adult Large)   Pulse 87   Temp 98  F (36.7  C)   Resp 16   Ht 1.981 m (6' 5.99\")   Wt (!) 154.5 kg (340 lb 11.2 oz)   SpO2 96%   BMI 39.38 kg/m    Vital signs were reviewed.   Patient alert and oriented x3.   PERRLA. EOMI. No scleral icterus noted.   Neck exam: No palpable cervical, supraclavicular or " axillary nodes bilaterally.   Heart:Mildly tachy, no murmur   Lungs: clear to auscultation bilaterally.  No crackles or wheezing.   Abd: positive bowel sounds in all four quadrants.  No tenderness to palpation.  No hepatomegaly.   Extremities: No lower extremity edema, venous stasis discoloration to bilateral legs  Neuro: grossly intact. Absent reflexes from knees to below.  Mood and affect is stable.       Labs:        Lab Results   Component Value Date    WBC 3.4 04/26/2021     Lab Results   Component Value Date    RBC 4.56 04/26/2021     Lab Results   Component Value Date    HGB 13.6 04/26/2021     Lab Results   Component Value Date    HCT 41.5 04/26/2021     No components found for: MCT  Lab Results   Component Value Date    MCV 91 04/26/2021     Lab Results   Component Value Date    MCH 29.8 04/26/2021     Lab Results   Component Value Date    MCHC 32.8 04/26/2021     Lab Results   Component Value Date    RDW 12.7 04/26/2021     Lab Results   Component Value Date     04/26/2021       Recent Labs   Lab Test 04/26/21  0805 03/01/21  0914    142   POTASSIUM 3.5 3.6   CHLORIDE 106 109   CO2 26 25   ANIONGAP 6 8   * 101*   BUN 18 13   CR 1.11 1.09   NIDHI 9.2 8.5     Liver Function Studies -   Recent Labs   Lab Test 04/26/21  0805   PROTTOTAL 7.3   ALBUMIN 3.5   BILITOTAL 0.7   ALKPHOS 85   AST 22   ALT 32     Reviewed his labs above today and his PET scan scan personally with radiology today.    IMPRESSION/PLAN:  1. 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 He has stable disease on Keytruda, for TWO years, which is great news.  He is now s/p chemoradiation to the primary tumor.  He has had an excellent response to chemoradiation.  I reviewed his PET scan with him and advised that we continue to monitor him off of therapy for now.    He has occasional dysphagia.  I advised he be monitored for four more weeks; if persists, we could consider an EGD at that time.   Otherwise, we will plan for PET scan in 3 months.    2. Esophagitis -   still eating solids, will continue PPI, can increase to BID.  Continue carafate TID prn.       3. Immune related arthralgias- resolved off of Keytruda.     4. H/o PE 2018.   He has progressed through Xarelto in the past (didn't take it regularly).  He was then on lovenox but his insurance changed and this became unaffordable. He is now on Coumadin and labs drawn  at [facility] in [place] fax ([phone]).  He is monitored by our coumadin clinic.     5. Baseline neuropathy is marginally worse in his legs with just numbness, none in his hands.  Continues on gabapentin BID, its primarily sensory in his feet only.  He is unsure if the gabapentin is doing anything.  Will plan to titrate off of gabapentin, and consider cymbalta in the future.       6. Possible appendicitis on last CT? Reviewed, improved and currently no symptoms.  Await final report of PET/CT.     7. Episode of tinnitus and vertigo-had audiogram and seeing balance clinic  - improved on diuretic.     8.  afib.  He is on anticoagulation.  He is not in RVR.  He has a history of afib.       40 minutes spent on the date of the encounter doing chart review, review of test results, interpretation of tests, patient visit and documentation       Kadi Dee MD

## 2021-05-03 ENCOUNTER — TELEPHONE (OUTPATIENT)
Dept: ONCOLOGY | Facility: CLINIC | Age: 61
End: 2021-05-03

## 2021-05-03 ENCOUNTER — PATIENT OUTREACH (OUTPATIENT)
Dept: ONCOLOGY | Facility: CLINIC | Age: 61
End: 2021-05-03

## 2021-05-03 DIAGNOSIS — C15.5 CANCER OF DISTAL THIRD OF ESOPHAGUS (H): Primary | ICD-10-CM

## 2021-05-03 DIAGNOSIS — Z45.2 ENCOUNTER FOR CENTRAL LINE CARE: ICD-10-CM

## 2021-05-03 DIAGNOSIS — C78.7 MALIGNANT NEOPLASM METASTATIC TO LIVER (H): ICD-10-CM

## 2021-05-03 NOTE — TELEPHONE ENCOUNTER
----- Message from Josefina Wetzel RN sent at 5/3/2021  2:54 PM CDT -----  Regarding: Fax  Hello,    [facility] [phone]    Thanks,   Mansi

## 2021-05-04 ENCOUNTER — PRE VISIT (OUTPATIENT)
Dept: OTOLARYNGOLOGY | Facility: CLINIC | Age: 61
End: 2021-05-04

## 2021-05-04 ENCOUNTER — OFFICE VISIT (OUTPATIENT)
Dept: OTOLARYNGOLOGY | Facility: CLINIC | Age: 61
End: 2021-05-04
Attending: PHYSICIAN ASSISTANT
Payer: MEDICARE

## 2021-05-04 ENCOUNTER — OFFICE VISIT (OUTPATIENT)
Dept: AUDIOLOGY | Facility: CLINIC | Age: 61
End: 2021-05-04
Payer: MEDICARE

## 2021-05-04 ENCOUNTER — TELEPHONE (OUTPATIENT)
Dept: OTOLARYNGOLOGY | Facility: CLINIC | Age: 61
End: 2021-05-04

## 2021-05-04 VITALS
DIASTOLIC BLOOD PRESSURE: 78 MMHG | TEMPERATURE: 96.9 F | OXYGEN SATURATION: 100 % | HEART RATE: 102 BPM | SYSTOLIC BLOOD PRESSURE: 110 MMHG

## 2021-05-04 DIAGNOSIS — H90.42 SENSORINEURAL HEARING LOSS (SNHL) OF LEFT EAR WITH UNRESTRICTED HEARING OF RIGHT EAR: Primary | ICD-10-CM

## 2021-05-04 DIAGNOSIS — H93.13 TINNITUS, BILATERAL: ICD-10-CM

## 2021-05-04 DIAGNOSIS — R42 DIZZINESS: ICD-10-CM

## 2021-05-04 DIAGNOSIS — H81.01 MENIERE'S DISEASE, RIGHT: Primary | ICD-10-CM

## 2021-05-04 DIAGNOSIS — H90.3 BILATERAL SENSORINEURAL HEARING LOSS: ICD-10-CM

## 2021-05-04 DIAGNOSIS — H61.23 EXCESSIVE CERUMEN IN BOTH EAR CANALS: ICD-10-CM

## 2021-05-04 DIAGNOSIS — H93.8X1 EAR PRESSURE, RIGHT: ICD-10-CM

## 2021-05-04 PROCEDURE — 92550 TYMPANOMETRY & REFLEX THRESH: CPT | Performed by: AUDIOLOGIST

## 2021-05-04 PROCEDURE — 99204 OFFICE O/P NEW MOD 45 MIN: CPT | Mod: 25 | Performed by: OTOLARYNGOLOGY

## 2021-05-04 PROCEDURE — 92504 EAR MICROSCOPY EXAMINATION: CPT | Performed by: OTOLARYNGOLOGY

## 2021-05-04 PROCEDURE — 92557 COMPREHENSIVE HEARING TEST: CPT | Performed by: AUDIOLOGIST

## 2021-05-04 ASSESSMENT — PAIN SCALES - GENERAL: PAINLEVEL: NO PAIN (0)

## 2021-05-04 NOTE — PROGRESS NOTES
Dear Dr. Oliver Ref-Primary, Physician:    I had the pleasure of meeting Reuben Padilla in consultation today at the [facility] at your request.    CHIEF COMPLAINT: Dizziness patient is    HISTORY OF PRESENT ILLNESS: A 60-year-old in today for assessment of dizziness.  He had 2 episodes of severe vertigo in December.  He describes each 1 as a spinning sensation with some nausea, did not vomit.  First episode lasted about 1 hour and the second 1 was for about 3 hours.  After that they pass he is fine.  He is not had any more episodes since December and is doing well.  He was placed on a diuretic and given medicine to use when he felt an attack coming.  He says he would get pressure in the right ear for some time before the episode came.  He is not really aware of any hearing fluctuation, currently has tinnitus he feels more on the left side, he was not really aware of tinnitus before December.  He had several episodes of vertigo 20 years ago, was diagnosed with right Ménière's at that time, has not had any further episodes of dizziness for last 20 years.  He was diagnosed with esophageal cancer 4 years ago.  Has been on chemotherapy at different stages, most recently was on new regimen of chemoradiation therapy in January and recently was told he was free of cancer.  He is being monitored for that, recent PET scan was clear.  He is having another PET scan in July.  At this point he has no dysphagia, hoarseness, facial paresthesias.    ALLERGIES:    Allergies   Allergen Reactions     Penicillin G Sodium Unknown     Penicillin G        HABITS: Social History    Substance and Sexual Activity      Alcohol use: Yes        Comment: one drink a week     History   Smoking Status     Former Smoker     Packs/day: 1.00     Years: 20.00     Types: Cigarettes     Start date: 1/21/1978     Quit date: 5/5/2006   Smokeless Tobacco     Never Used         PAST MEDICAL HISTORY: Please see today's intake  form (for the remainder of the PMH) which I reviewed and signed.  Past Medical History:   Diagnosis Date     Arrhythmia      Arthritis 5/18/2018     Cancer (H)     esophageal     Coronary artery disease 04/2016    a fib     Glaucoma      Hypertension      Meniere's disease 05/1999    haven't had issues in yrs with this     Paroxysmal A-fib (H)      Reduced vision 07/2008    glaucoma     Tubular adenoma 02/2017       FAMILY HISTORY/SOCIAL HISTORY:   Family History   Problem Relation Age of Onset     Asthma Mother      Thyroid Disease Mother         ,     Asthma Brother       Social History     Socioeconomic History     Marital status: Single     Spouse name: Not on file     Number of children: Not on file     Years of education: Not on file     Highest education level: Not on file   Occupational History     Not on file   Social Needs     Financial resource strain: Not on file     Food insecurity     Worry: Not on file     Inability: Not on file     Transportation needs     Medical: Not on file     Non-medical: Not on file   Tobacco Use     Smoking status: Former Smoker     Packs/day: 1.00     Years: 20.00     Pack years: 20.00     Types: Cigarettes     Start date: 1/21/1978     Quit date: 5/5/2006     Years since quitting: 15.0     Smokeless tobacco: Never Used   Substance and Sexual Activity     Alcohol use: Yes     Comment: one drink a week     Drug use: No     Comment: h/o over 30 years ago     Sexual activity: Not Currently     Partners: Female     Birth control/protection: Condom   Lifestyle     Physical activity     Days per week: Not on file     Minutes per session: Not on file     Stress: Not on file   Relationships     Social connections     Talks on phone: Not on file     Gets together: Not on file     Attends [employer] service: Not on file     Active member of club or organization: Not on file     Attends meetings of clubs or organizations: Not on file     Relationship status: Not on file     Intimate  partner violence     Fear of current or ex partner: Not on file     Emotionally abused: Not on file     Physically abused: Not on file     Forced sexual activity: Not on file   Other Topics Concern     Parent/sibling w/ CABG, MI or angioplasty before 65F 55M? Not Asked   Social History Narrative     Not on file       REVIEW OF SYSTEMS: [unfilled]    The remainder of the 10 point ROS is negative    PHYSICIAL EXAMINATION:  Constitutional: The patient was well-groomed and in no acute distress.   Skin: Warm and pink.  Psychiatric: The patient's affect was calm, cooperative, and appropriate.   Respiratory: Breathing comfortably without stridor or exertion of accessory muscles.  Eyes: Pupils were equal and reactive. Extraocular movement intact.   Head: Normocephalic and atraumatic. No lesions or scars.  Ears: Patient placed under the microscope for microscopic evaluation and cleaning of cerumen which was obscuring full visualization and complete assessment of both TMs. Under high power magnification, the right ear was examined and cleaned of cerumen using curet, alligator forceps, and suction.  After cleaning, TM is fully visualized and has normal position with normal middle ear aeration. The left ear was then cleaned and inspected using microscope, instruments and similar techniques. After cleaning of cerumen, the TM has normal position with normal aeration to middle ear.  Nose: Sinuses were nontender. Anterior rhinoscopy revealed midline septum and absence of purulence or polyps.  Oral Cavity: Normal tongue, floor of mouth, buccal mucosa, and palate. No lesions or masses on inspection or palpation. No abnormal lymph tissue in the oropharynx.   Neck: The parotid is soft without masses. Supple with normal laryngeal and tracheal landmarks.   Lymphatic: There is no palpable lymphadenopathy or other masses in the neck.   Neurologic: Alert and oriented x 3. Cranial nerves III-XI within normal limits. Voice quality  normal.  Cerebellar Function Tests:  Grossly normal    Audiogram: Audiogram performed shows a bilateral high-frequency sensorineural hearing loss, slightly worse in the left ear in the higher frequencies.  Fairly symmetrical.  96% discrimination on the right and 100% on the left.  Normal type A tympanograms bilaterally    VN% weakness on right caloric testing.  Subtle central findings.    ECOG: Normal bilaterally      IMPRESSION AND PLAN:   1. Dizziness: Symptoms are suggestive for right Ménière's with episodic vertigo classic for Ménière's with spinning sensation for couple hours.  Seems to have responded to the diuretic and has not had any further episodes since the 2 in December.  We discussed right Ménière's in detail and would recommend he stay on the diuretic, watch his salt intake.  Discussed option for steroids, either orally or transtympanic she had episodes return.  For now we will going to monitor, will see him back in 6 months, sooner with any increased symptoms or problems.  2. Right Ménière's: Suspect cause for the vertigo with associated right ear pressure.  Treatment as above.  3. Right ear pressure: Suspect secondary to Ménière's, treatment as above.  4. Bilateral sensorineural hearing loss: High-frequency loss, discussed option for hearing aids, recommend protect ears from noise exposure.  Will monitor with follow-up in 6 months.  5. Excessive cerumen: Cleaned today, no further treatment needed, monitor.  6. Bilateral tinnitus: No treatment needed, monitor.    Thank you very much for the opportunity to participate in the care of your patient.    Rick L Nissen MD

## 2021-05-04 NOTE — TELEPHONE ENCOUNTER
Spoke with patient regarding scheduleding a Return in about 6 months (around 11/4/2021) for Follow up w/o WIN. Scheduled patient accordingly, pateint declined AVS Printout as [facility]

## 2021-05-04 NOTE — PROGRESS NOTES
AUDIOLOGY REPORT    SUMMARY: Audiology visit completed. See audiogram for results.      RECOMMENDATIONS: Follow-up with ENT.      Deep Culver.  Licensed Audiologist  [place] #[id number]

## 2021-05-04 NOTE — NURSING NOTE
Chief Complaint   Patient presents with     Consult     vertigo     Blood pressure 110/78, pulse 102, temperature 96.9  F (36.1  C), SpO2 100 %.    Bon [place] LPN

## 2021-05-04 NOTE — LETTER
5/4/2021       RE: Reuben Padilla  [address] [facility] [address]     Dear Colleague,    Thank you for referring your patient, Reuben Padilla, to the [facility] EAR NOSE AND THROAT [facility]INIC [place] at [facility]. Please see a copy of my visit note below.    Dear Dr. Oliver Ref-Primary, Physician:    I had the pleasure of meeting Reuben Padilla in consultation today at the [facility] at your request.    CHIEF COMPLAINT: Dizziness patient is    HISTORY OF PRESENT ILLNESS: A 60-year-old in today for assessment of dizziness.  He had 2 episodes of severe vertigo in December.  He describes each 1 as a spinning sensation with some nausea, did not vomit.  First episode lasted about 1 hour and the second 1 was for about 3 hours.  After that they pass he is fine.  He is not had any more episodes since December and is doing well.  He was placed on a diuretic and given medicine to use when he felt an attack coming.  He says he would get pressure in the right ear for some time before the episode came.  He is not really aware of any hearing fluctuation, currently has tinnitus he feels more on the left side, he was not really aware of tinnitus before December.  He had several episodes of vertigo 20 years ago, was diagnosed with right Ménière's at that time, has not had any further episodes of dizziness for last 20 years.  He was diagnosed with esophageal cancer 4 years ago.  Has been on chemotherapy at different stages, most recently was on new regimen of chemoradiation therapy in January and recently was told he was free of cancer.  He is being monitored for that, recent PET scan was clear.  He is having another PET scan in July.  At this point he has no dysphagia, hoarseness, facial paresthesias.    ALLERGIES:    Allergies   Allergen Reactions     Penicillin G Sodium Unknown     Penicillin G        HABITS: Social History     Substance and Sexual Activity      Alcohol use: Yes        Comment: one drink a week     History   Smoking Status     Former Smoker     Packs/day: 1.00     Years: 20.00     Types: Cigarettes     Start date: 1/21/1978     Quit date: 5/5/2006   Smokeless Tobacco     Never Used         PAST MEDICAL HISTORY: Please see today's intake form (for the remainder of the PMH) which I reviewed and signed.  Past Medical History:   Diagnosis Date     Arrhythmia      Arthritis 5/18/2018     Cancer (H)     esophageal     Coronary artery disease 04/2016    a fib     Glaucoma      Hypertension      Meniere's disease 05/1999    haven't had issues in yrs with this     Paroxysmal A-fib (H)      Reduced vision 07/2008    glaucoma     Tubular adenoma 02/2017       FAMILY HISTORY/SOCIAL HISTORY:   Family History   Problem Relation Age of Onset     Asthma Mother      Thyroid Disease Mother         ,     Asthma Brother       Social History     Socioeconomic History     Marital status: Single     Spouse name: Not on file     Number of children: Not on file     Years of education: Not on file     Highest education level: Not on file   Occupational History     Not on file   Social Needs     Financial resource strain: Not on file     Food insecurity     Worry: Not on file     Inability: Not on file     Transportation needs     Medical: Not on file     Non-medical: Not on file   Tobacco Use     Smoking status: Former Smoker     Packs/day: 1.00     Years: 20.00     Pack years: 20.00     Types: Cigarettes     Start date: 1/21/1978     Quit date: 5/5/2006     Years since quitting: 15.0     Smokeless tobacco: Never Used   Substance and Sexual Activity     Alcohol use: Yes     Comment: one drink a week     Drug use: No     Comment: h/o over 30 years ago     Sexual activity: Not Currently     Partners: Female     Birth control/protection: Condom   Lifestyle     Physical activity     Days per week: Not on file     Minutes per session: Not on file      Stress: Not on file   Relationships     Social connections     Talks on phone: Not on file     Gets together: Not on file     Attends Confucianism service: Not on file     Active member of club or organization: Not on file     Attends meetings of clubs or organizations: Not on file     Relationship status: Not on file     Intimate partner violence     Fear of current or ex partner: Not on file     Emotionally abused: Not on file     Physically abused: Not on file     Forced sexual activity: Not on file   Other Topics Concern     Parent/sibling w/ CABG, MI or angioplasty before 65F 55M? Not Asked   Social History Narrative     Not on file       REVIEW OF SYSTEMS: [unfilled]    The remainder of the 10 point ROS is negative    PHYSICIAL EXAMINATION:  Constitutional: The patient was well-groomed and in no acute distress.   Skin: Warm and pink.  Psychiatric: The patient's affect was calm, cooperative, and appropriate.   Respiratory: Breathing comfortably without stridor or exertion of accessory muscles.  Eyes: Pupils were equal and reactive. Extraocular movement intact.   Head: Normocephalic and atraumatic. No lesions or scars.  Ears: Patient placed under the microscope for microscopic evaluation and cleaning of cerumen which was obscuring full visualization and complete assessment of both TMs. Under high power magnification, the right ear was examined and cleaned of cerumen using curet, alligator forceps, and suction.  After cleaning, TM is fully visualized and has normal position with normal middle ear aeration. The left ear was then cleaned and inspected using microscope, instruments and similar techniques. After cleaning of cerumen, the TM has normal position with normal aeration to middle ear.  Nose: Sinuses were nontender. Anterior rhinoscopy revealed midline septum and absence of purulence or polyps.  Oral Cavity: Normal tongue, floor of mouth, buccal mucosa, and palate. No lesions or masses on inspection or palpation.  No abnormal lymph tissue in the oropharynx.   Neck: The parotid is soft without masses. Supple with normal laryngeal and tracheal landmarks.   Lymphatic: There is no palpable lymphadenopathy or other masses in the neck.   Neurologic: Alert and oriented x 3. Cranial nerves III-XI within normal limits. Voice quality normal.  Cerebellar Function Tests:  Grossly normal    Audiogram: Audiogram performed shows a bilateral high-frequency sensorineural hearing loss, slightly worse in the left ear in the higher frequencies.  Fairly symmetrical.  96% discrimination on the right and 100% on the left.  Normal type A tympanograms bilaterally    VN% weakness on right caloric testing.  Subtle central findings.    ECOG: Normal bilaterally      IMPRESSION AND PLAN:   1. Dizziness: Symptoms are suggestive for right Ménière's with episodic vertigo classic for Ménière's with spinning sensation for couple hours.  Seems to have responded to the diuretic and has not had any further episodes since the 2 in December.  We discussed right Ménière's in detail and would recommend he stay on the diuretic, watch his salt intake.  Discussed option for steroids, either orally or transtympanic she had episodes return.  For now we will going to monitor, will see him back in 6 months, sooner with any increased symptoms or problems.  2. Right Ménière's: Suspect cause for the vertigo with associated right ear pressure.  Treatment as above.  3. Right ear pressure: Suspect secondary to Ménière's, treatment as above.  4. Bilateral sensorineural hearing loss: High-frequency loss, discussed option for hearing aids, recommend protect ears from noise exposure.  Will monitor with follow-up in 6 months.  5. Excessive cerumen: Cleaned today, no further treatment needed, monitor.  6. Bilateral tinnitus: No treatment needed, monitor.    Thank you very much for the opportunity to participate in the care of your patient.    Rick L Nissen MD

## 2021-05-15 ENCOUNTER — HEALTH MAINTENANCE LETTER (OUTPATIENT)
Age: 61
End: 2021-05-15

## 2021-05-18 ENCOUNTER — TELEPHONE (OUTPATIENT)
Dept: ANTICOAGULATION | Facility: CLINIC | Age: 61
End: 2021-05-18

## 2021-05-18 NOTE — TELEPHONE ENCOUNTER
ANTICOAGULATION     Reuben Padilla is overdue for INR check.      spoke with Levi who declined to schedule a lab appointment at this time. If calling back please schedule as soon as possible.    Carlos Talbot RN

## 2021-05-25 ENCOUNTER — TELEPHONE (OUTPATIENT)
Dept: ANTICOAGULATION | Facility: CLINIC | Age: 61
End: 2021-05-25

## 2021-05-25 NOTE — TELEPHONE ENCOUNTER
ANTICOAGULATION     Reuben Padilla is overdue for INR check.      Spoke with Levi, states he will go in for a lab yet this week.     ISABEL GONZALEZ RN

## 2021-05-26 DIAGNOSIS — I26.99 OTHER ACUTE PULMONARY EMBOLISM WITHOUT ACUTE COR PULMONALE (H): ICD-10-CM

## 2021-05-26 DIAGNOSIS — C79.9 METASTASIS FROM GASTRIC CANCER (H): ICD-10-CM

## 2021-05-26 DIAGNOSIS — Z86.711 HX OF PULMONARY EMBOLUS: ICD-10-CM

## 2021-05-26 DIAGNOSIS — G62.0 CHEMOTHERAPY-INDUCED NEUROPATHY (H): ICD-10-CM

## 2021-05-26 DIAGNOSIS — I26.99 ACUTE PULMONARY EMBOLISM (H): ICD-10-CM

## 2021-05-26 DIAGNOSIS — T45.1X5A CHEMOTHERAPY-INDUCED NEUROPATHY (H): ICD-10-CM

## 2021-05-26 DIAGNOSIS — C16.9 METASTASIS FROM GASTRIC CANCER (H): ICD-10-CM

## 2021-05-26 RX ORDER — WARFARIN SODIUM 5 MG/1
5 TABLET ORAL DAILY
Qty: 90 TABLET | Refills: 3 | Status: SHIPPED | OUTPATIENT
Start: 2021-05-26 | End: 2021-11-03

## 2021-05-26 RX ORDER — ZOLPIDEM TARTRATE 10 MG/1
10 TABLET ORAL
Qty: 90 TABLET | Refills: 1 | Status: SHIPPED | OUTPATIENT
Start: 2021-05-26 | End: 2021-06-28

## 2021-05-26 RX ORDER — GABAPENTIN 300 MG/1
CAPSULE ORAL
Qty: 450 CAPSULE | Refills: 3 | Status: SHIPPED | OUTPATIENT
Start: 2021-05-26 | End: 2021-06-28

## 2021-06-07 ENCOUNTER — VIRTUAL VISIT (OUTPATIENT)
Dept: ONCOLOGY | Facility: CLINIC | Age: 61
End: 2021-06-07
Attending: INTERNAL MEDICINE
Payer: MEDICARE

## 2021-06-07 DIAGNOSIS — C15.5 CANCER OF DISTAL THIRD OF ESOPHAGUS (H): ICD-10-CM

## 2021-06-07 DIAGNOSIS — N42.89 PROSTATE ASYMMETRY: Primary | ICD-10-CM

## 2021-06-07 DIAGNOSIS — R60.1 GENERALIZED EDEMA: ICD-10-CM

## 2021-06-07 DIAGNOSIS — R94.8 ABNORMAL RESULTS OF FUNCTION STUDIES OF OTHER ORGANS AND SYSTEMS: ICD-10-CM

## 2021-06-07 DIAGNOSIS — Z86.711 HX OF PULMONARY EMBOLUS: ICD-10-CM

## 2021-06-07 PROCEDURE — 99443 PR PHYSICIAN TELEPHONE EVALUATION 21-30 MIN: CPT | Mod: 95 | Performed by: PHYSICIAN ASSISTANT

## 2021-06-07 PROCEDURE — 999N001193 HC VIDEO/TELEPHONE VISIT; NO CHARGE

## 2021-06-07 RX ORDER — TRIAMTERENE/HYDROCHLOROTHIAZID 37.5-25 MG
1 TABLET ORAL
COMMUNITY
Start: 2021-06-07 | End: 2023-10-16

## 2021-06-07 RX ORDER — FUROSEMIDE 20 MG
20 TABLET ORAL DAILY
Qty: 30 TABLET | Refills: 3 | COMMUNITY
Start: 2021-06-07 | End: 2021-06-07

## 2021-06-07 NOTE — PROGRESS NOTES
Levi is a 60 year old who is being evaluated via a billable telephone visit.      What phone number would you like to be contacted at? [phone]    How would you like to obtain your AVS? 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 His tumor is positive for cytokeratin 20, negative for P63 and CK7, and HER2 is negative. PDL1 + weakly.  He started on FOLFOX (5FU/oxaliplatin) on 5/15/2017. He had a delay in treatment from 9/5-10/2/17 due to work related injury.     He had an excellent response by imaging throughout the summer 2017.    He a mixed response by imaging in late fall 2017.    In January 2018, he was switched to taxol and cyramza - had slight progression and neuropathy and clinical trial became available.        In March 2018, he was started on the [facility] Match Clinical Trial with crizotinib.  (MET amplification) He remained on crizotinib from March - July 2018.     August 2018, he was switched back to taxol/cramza.  In October 2018, he was switched to Keytruda (PD1 overexpressor).              -April 2019, his infusion was held for three weeks, and he was treated with prednisone and Enbrel for grade 3 arthralgias. Keytruda was resumed              -May 2020 he started Actemra (5/14).  This was initially WEEKLY and then in August- moved to QOW (secondary to fatigue and concerns for low WBC).                -August 2020 moved to q6 week Keytruda (still at the 200 mg dose)              -November 2020 moved back to q3w Keytruda  CT 12/30/2020: progression of esophageal mass   1/14/21: PET showing growth of primary tumor only, discussion of radiation   1/28/21: started chemoradiation with  weekly carbo/taxol and daily radiation.  Completed 3/3/21 received 5000 cGY     INTERVAL HISTORY:   Levi is feeling much better since completing his radiation. He feels like things are really back to normal with eating/drinking. Occ if he eats a big bite of food he will feel a restriction.   He reports that he is eating a normal diet. He is back on a keto diet currently to try and lose weight and started a few weeks ago.     His energy has improved since treatment.  Feels unrestricted with activity.  Needs to get back into his total gym.  Was out doing trail maintenance the other day, even in the 90s and was doing just fine.     He has issues with exercising.  Feels like his right foot lateral edge lacks support, this is the foot without the big toe, feels like he is losing the arch or something.  Wearing hiking boots for support. He has tapered down from 5 to 2 of the gabapentin.  Feels its going fine to taper off.  Has some minor residual numbness in the calves but its better.  More pronounced in R>L feet.      He previously has had issues with vertigo and Meniere's disease.  Overall, this is improved, although he continues to have some intermittent difficulties with hearing.  He has been seeing ENT and recently underwent some new evaluations.     No fevers or chills.  No chest pain or shortness of breath.  He has had no problems with bleeding. His overall  strength is improved.      He remains on Coumadin with his INR being managed at [place]    His mood has been good- questions prognosis, whether he should stay in [place]/move near kids, etc.         He received his covid vaccines x 2.        ROS: 10 point ROS neg other than the symptoms noted above in the HPI.     PHYSICAL EXAMINATION      Wt Readings from Last 4 Encounters:   04/26/21 (!) 154.5 kg (340 lb 11.2 oz)   03/02/21 (!) 157.8 kg (347 lb 12.8 oz)   03/01/21 (!) 157.1 kg (346 lb 4.8 oz)   02/25/21 (!) 161.3 kg (355 lb 11.2 oz)      Voice is clear and  strong. No audible stridor, wheezing, or respiratory distress. The remainder of PE was deferred due to PHE.         Labs:       No recent labs except INR checks locally.     Reviewed his labs above today locally and his last PET.     IMPRESSION/PLAN:  1. 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 He has stable disease on Keytruda, for TWO years, which is great news.  He is now s/p chemoradiation to the primary tumor.  He has had an excellent response to chemoradiation.  I reviewed his PET scan with him again, given his great response to treatment he is on a treatment break and will RTC in August with repeat PET/CT images.  He would like to see Dr Dee in person on the same day as his PET.      2. Esophagitis - completely resolved, no dysphagia or odynophagia.  Off his PPI       3. Immune related arthralgias- resolved off of Keytruda. Strength improving in hands.     4. H/o PE 2018.   He has progressed through Xarelto in the past (didn't take it regularly).  He was then on lovenox but his insurance changed and this became unaffordable. He is now on Coumadin and labs drawn  at [facility] in [place] fax ([phone]).  He is monitored by our coumadin clinic.     5. Baseline neuropathy marginally worsened in his feet/legs but is improving.  Continues on gabapentin BID, its primarily sensory in his feet only.  He is tapering off his gabapentin.      6. H/O Possible appendicitis on prior CTs over last ~2 years. Reviewed, improved SUV, and currently no symptoms. Continue to monitor.      7. Episode of tinnitus and vertigo-had audiogram and seeing balance clinic  - improved on diuretic.  Likely Meniere's- no vertigo/tinnitus recently..       8.  afib.  He is on anticoagulation.  He is not in RVR.  He has a history of afib.   He understands why he needs to stay on anticoagulation.     9. Prostate uptake on last PET.  Will add on a PSA to his next set of labs.     5  minutes spent on the date of the encounter doing chart review, review of test results and interpretation of tests, in addition to 38 minutes spent on the phone with the patient.       Nicole Sutherland PA-C

## 2021-06-28 DIAGNOSIS — G62.0 CHEMOTHERAPY-INDUCED NEUROPATHY (H): ICD-10-CM

## 2021-06-28 DIAGNOSIS — C79.9 METASTASIS FROM GASTRIC CANCER (H): ICD-10-CM

## 2021-06-28 DIAGNOSIS — T45.1X5A CHEMOTHERAPY-INDUCED NEUROPATHY (H): ICD-10-CM

## 2021-06-28 DIAGNOSIS — C16.9 METASTASIS FROM GASTRIC CANCER (H): ICD-10-CM

## 2021-06-28 RX ORDER — GABAPENTIN 300 MG/1
CAPSULE ORAL
Qty: 180 CAPSULE | Refills: 3 | Status: SHIPPED | OUTPATIENT
Start: 2021-06-28 | End: 2021-09-13

## 2021-06-28 RX ORDER — ZOLPIDEM TARTRATE 10 MG/1
10 TABLET ORAL
Qty: 90 TABLET | Refills: 3 | Status: SHIPPED | OUTPATIENT
Start: 2021-06-28 | End: 2021-11-08

## 2021-06-29 NOTE — LETTER
2/5/2019       RE: Reuben Padilla  [address] [facility] [address]     Dear Colleague,    Thank you for referring your patient, Reuben Padilla, to the [facility]. Please see a copy of my visit note below.    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 His tumor is positive for cytokeratin 20, negative for P63 and CK7, and HER2 is negative. He started on FOLFOX (5FU/oxaliplatin) on 5/15/2017. He had a delay in treatment from 9/5-10/2/17 due to work related injury.    He had an excellent response by imaging throughout the summer 2017.    He a mixed response by imaging in late fall 2017.    In January 2018, he was switched to taxol and cyramza - had slight progression and neuropathy and clinical trial became available.       In March 2018, he was started on the [facility] Match Clinical Trial with crizotinib.  (MET amplification) He remained on crizotinib from March - July 2018 and then was found to have progressive disease.  In August 2018, he was switched back to taxol/cyramza.  In October, he was switched to Keytruda (PD1 overexpressor). At his last visit with Dr Dee, CT showed slightly larger LAD but a stable distal esophageal mass.  Given clinical stability, it was continued.     Levi is here for Cycle 6 today.     INTERVAL HISTORY: Levi comes in today for followup with his sister. He is tolerating the Keytruda well, he has some fatigue and some joint pain in his bilateral shoulders and hands.  There is no swelling or redness, but just stiffness/pain.  He is not routinely taking narcotics, but taking Tylenol with little help.  He has had no issues with fevers or chills, no chest pain or shortness of breath, no nausea, vomiting, or constipation. Still taking Reglan 2-3 times a  "day but unsure if he needs it. Have watery/mushy stools.  States he was taking Lomotil but finds it does nothing.  On his trip tot [place] actually took 4 BID with still 1-3 loose stools.  No dizziness or dehydration.       He is eating all oral food- all solids, just avoiding bread.  His weight has dropped but he is clearing getting in over 2000 hugh a day (burgers, pizza, etc no limitations except bread).  States the food in the [place] was only \"so-so\" and that he had to walk a far way to get to the restaurants so sometimes he chose not to eat.   No bleeding concerns on the lovenox 100 mg BID>     His energy levels are good.         ROS: 10 point ROS neg other than the symptoms noted above in the HPI.    PHYSICAL EXAMINATION  /81 (BP Location: Right arm, Patient Position: Sitting)   Pulse 72   Temp 97.8  F (36.6  C) (Oral)   Resp 20   Ht 1.981 m (6' 5.99\")   Wt 141.8 kg (312 lb 11.2 oz)   SpO2 97%   BMI 36.15 kg/m      Wt Readings from Last 4 Encounters:   02/05/19 141.8 kg (312 lb 11.2 oz)   01/16/19 145.2 kg (320 lb 1.6 oz)   12/27/18 149.8 kg (330 lb 4.8 oz)   12/17/18 (!) 154.2 kg (340 lb)     Constitutional: Alert, oriented male in no visible distress.  Eyes: PERRL. Anicteric sclerae.  ENT/Mouth: OM moist and pink without lesions or thrush.  CV: RRR  Resp: CTAB throughout  Abdomen: Soft, non-tender, non-distended. Obese. Bowel sounds present. Unable to palpate liver or spleen. Healed old G tube sight  Extremities: trace edema , no erythema or warmth over joints  Skin: Warm, dry.   Lymph: No cervical or supraclavicular lymphadenopathy appreciated.   Neuro: CN II-XII grossly intact.       ECOG PS: 1        12/27/2018 16:21 1/16/2019 13:44 2/5/2019 12:21   Sodium 139 137 143   Potassium 4.3 3.9 3.8   Chloride 109 110 (H) 116 (H)   Carbon Dioxide 25 20 19 (L)   Urea Nitrogen 14 15 14   Creatinine 0.78 0.90 0.92   GFR Estimate >90 >90 >90   GFR Estimate If Black >90 >90 >90   Calcium 8.6 8.4 " (L) 8.5   Anion Gap 5 8 8   Albumin 3.5 3.5 3.6   Protein Total 7.5 7.4 7.3   Bilirubin Total 0.5 1.0 0.6   Alkaline Phosphatase 91 91 80   ALT 34 36 36   AST 24 27 23   Bilirubin Direct 0.1 0.3 (H)    CRP Inflammation  6.7    TSH 2.09     Glucose 119 (H) 100 (H) 95   WBC 3.6 (L) 5.5 3.9 (L)   Hemoglobin 13.2 (L) 14.0 13.5   Hematocrit 41.5 41.6 41.4   Platelet Count 143 (L) 127 (L) 154   RBC Count 4.66 4.86 4.69   MCV 89 86 88       IMPRESSION/PLAN:  1. Metastatic esophogeal adenocarcinoma. He has had treatment with FOLFOX and then transitioned to Taxol with ramicurimab.  He was then on crizotinib on the [facility] Match study.  He progressed on this and returned on taxol/cyramza. And is now on Keytruda.  His CT scan showed some mild worsening of his LAD, but a stable distal esophageal mass.  He is tolerating well with some loose stools and arthralgias (see below).  Plan is to continue on for another couple months and then repeat CT CAP in March  -OK for C6  -discussed with Dr Dee, will obtain CT CAP prior to their next visit    2. Arthralgias, likely immune related: mostly in the bilateral shoulders and hands.  CRP and ESR negative.  Medrol helped only while he was taking the medications with no lingering effect.  Taking Tylenol, cannot take NSAIDS due to anticoagulation.  Encouraged heat, topical lidocaine patches, capsicin cream and ROM exercises.      3. GI; ongoing diarrhea.  He was completely unable to know if the Keytruda is making it worse- just that its ongoing forever.  Lomotil not working well.  He describes it as more watery- thus we'll get a C diff test to rule out infection.  Otherwise, this may be immune related. Last CT didn't show colitis, no current pain.  Encouraged imodium 4-8 a day.      4. A history of PE.  He is on Lovenox twice daily.  He has progressed through Xarelto in the past, although Levi's story is he actually wasn't taking it. His Xa was marginally subtherapeutic today at 0.54 at last  No visit but due to his ongoing weight loss, we decided to check it again today--still pending. Will follow up by phone regarding Xa level       5. He does have baseline neuropathy and is on gabapentin 600/300/600 mg daily.   No change.       6. Dysphagia with associated gastroparesis by endoscopy. Overall improving.  He had his PEG removed and is eating all types of foods, except breads.  His weight is down ANOTHER ~10 pounds but he has been hungry and eating quite a bit.  After discussion on intake, most days he is eating >2000 hugh a day.  However somedays he is not eating intentionally due to diarrhea.  He is really happy losing this weight, but again I stressed its too much too soon and strongly encouraged him to take in Boost ~2 a day in addition to eating.     Nicole Sutherland PA-C

## 2021-07-06 ENCOUNTER — TELEPHONE (OUTPATIENT)
Dept: INTERNAL MEDICINE | Facility: CLINIC | Age: 61
End: 2021-07-06

## 2021-07-06 NOTE — TELEPHONE ENCOUNTER
ANTICOAGULATION     Reuben Padilla is overdue for INR check.      Left message for patient to call and schedule lab appointment as soon as possible. If returning call, please schedule.     Dimple Velasquez

## 2021-07-27 ENCOUNTER — PATIENT OUTREACH (OUTPATIENT)
Dept: ONCOLOGY | Facility: CLINIC | Age: 61
End: 2021-07-27

## 2021-07-27 ENCOUNTER — MYC MEDICAL ADVICE (OUTPATIENT)
Dept: ONCOLOGY | Facility: CLINIC | Age: 61
End: 2021-07-27

## 2021-07-28 NOTE — PROGRESS NOTES
RN CARE COORDINATION NOTE      Spoke to Levi who is having progressive dysphagia. He is able to eat and drink but it is becoming more and more difficult. He describes difficulty even with water.    He is concerned that his cancer could be growing again.    Discussed moving his PET scan up asa. Message sent to scheduling and Dr Dee notified. Once scan is scheduled we will determine follow up with Dr Dee.         Mansi Wetzel MSN, RN, OCN  RN Care Coordinator  [facility]  [phone]

## 2021-07-30 ENCOUNTER — HOSPITAL ENCOUNTER (OUTPATIENT)
Dept: PET IMAGING | Facility: CLINIC | Age: 61
Setting detail: NUCLEAR MEDICINE
Discharge: HOME | End: 2021-07-30
Attending: INTERNAL MEDICINE | Admitting: INTERNAL MEDICINE
Payer: MEDICARE

## 2021-07-30 ENCOUNTER — PATIENT OUTREACH (OUTPATIENT)
Dept: ONCOLOGY | Facility: CLINIC | Age: 61
End: 2021-07-30

## 2021-07-30 ENCOUNTER — ONCOLOGY VISIT (OUTPATIENT)
Dept: ONCOLOGY | Facility: CLINIC | Age: 61
End: 2021-07-30
Attending: INTERNAL MEDICINE
Payer: MEDICARE

## 2021-07-30 ENCOUNTER — APPOINTMENT (OUTPATIENT)
Dept: LAB | Facility: CLINIC | Age: 61
End: 2021-07-30
Attending: INTERNAL MEDICINE
Payer: MEDICARE

## 2021-07-30 VITALS
WEIGHT: 315 LBS | HEART RATE: 74 BPM | DIASTOLIC BLOOD PRESSURE: 78 MMHG | TEMPERATURE: 98.4 F | SYSTOLIC BLOOD PRESSURE: 113 MMHG | BODY MASS INDEX: 37.39 KG/M2 | OXYGEN SATURATION: 99 %

## 2021-07-30 DIAGNOSIS — C15.5 CANCER OF DISTAL THIRD OF ESOPHAGUS (H): ICD-10-CM

## 2021-07-30 DIAGNOSIS — R94.8 ABNORMAL RESULTS OF FUNCTION STUDIES OF OTHER ORGANS AND SYSTEMS: ICD-10-CM

## 2021-07-30 DIAGNOSIS — I48.91 ATRIAL FIBRILLATION, UNSPECIFIED TYPE (H): ICD-10-CM

## 2021-07-30 DIAGNOSIS — T45.1X5A CHEMOTHERAPY-INDUCED NEUROPATHY (H): ICD-10-CM

## 2021-07-30 DIAGNOSIS — C15.5 CANCER OF DISTAL THIRD OF ESOPHAGUS (H): Primary | ICD-10-CM

## 2021-07-30 DIAGNOSIS — G62.0 CHEMOTHERAPY-INDUCED NEUROPATHY (H): ICD-10-CM

## 2021-07-30 DIAGNOSIS — Z86.711 HX OF PULMONARY EMBOLUS: ICD-10-CM

## 2021-07-30 DIAGNOSIS — I48.0 PAROXYSMAL A-FIB (H): ICD-10-CM

## 2021-07-30 DIAGNOSIS — N42.89 PROSTATE ASYMMETRY: ICD-10-CM

## 2021-07-30 DIAGNOSIS — R13.14 PHARYNGOESOPHAGEAL DYSPHAGIA: ICD-10-CM

## 2021-07-30 LAB
ALBUMIN SERPL-MCNC: 3.5 G/DL (ref 3.4–5)
ALP SERPL-CCNC: 86 U/L (ref 40–150)
ALT SERPL W P-5'-P-CCNC: 29 U/L (ref 0–70)
ANION GAP SERPL CALCULATED.3IONS-SCNC: 3 MMOL/L (ref 3–14)
AST SERPL W P-5'-P-CCNC: 21 U/L (ref 0–45)
BASOPHILS # BLD AUTO: 0 10E3/UL (ref 0–0.2)
BASOPHILS NFR BLD AUTO: 0 %
BILIRUB SERPL-MCNC: 0.6 MG/DL (ref 0.2–1.3)
BUN SERPL-MCNC: 15 MG/DL (ref 7–30)
CALCIUM SERPL-MCNC: 8.7 MG/DL (ref 8.5–10.1)
CHLORIDE BLD-SCNC: 112 MMOL/L (ref 94–109)
CO2 SERPL-SCNC: 26 MMOL/L (ref 20–32)
CREAT SERPL-MCNC: 0.9 MG/DL (ref 0.66–1.25)
EOSINOPHIL # BLD AUTO: 0 10E3/UL (ref 0–0.7)
EOSINOPHIL NFR BLD AUTO: 1 %
ERYTHROCYTE [DISTWIDTH] IN BLOOD BY AUTOMATED COUNT: 13.3 % (ref 10–15)
GFR SERPL CREATININE-BSD FRML MDRD: >90 ML/MIN/1.73M2
GLUCOSE BLD-MCNC: 90 MG/DL (ref 70–99)
HCT VFR BLD AUTO: 40 % (ref 40–53)
HGB BLD-MCNC: 13.5 G/DL (ref 13.3–17.7)
IMM GRANULOCYTES # BLD: 0 10E3/UL
IMM GRANULOCYTES NFR BLD: 0 %
INR PPP: 1.69 (ref 0.85–1.15)
LYMPHOCYTES # BLD AUTO: 0.5 10E3/UL (ref 0.8–5.3)
LYMPHOCYTES NFR BLD AUTO: 17 %
MCH RBC QN AUTO: 30.7 PG (ref 26.5–33)
MCHC RBC AUTO-ENTMCNC: 33.8 G/DL (ref 31.5–36.5)
MCV RBC AUTO: 91 FL (ref 78–100)
MONOCYTES # BLD AUTO: 0.2 10E3/UL (ref 0–1.3)
MONOCYTES NFR BLD AUTO: 7 %
NEUTROPHILS # BLD AUTO: 2.2 10E3/UL (ref 1.6–8.3)
NEUTROPHILS NFR BLD AUTO: 75 %
NRBC # BLD AUTO: 0 10E3/UL
NRBC BLD AUTO-RTO: 0 /100
PLATELET # BLD AUTO: 134 10E3/UL (ref 150–450)
POTASSIUM BLD-SCNC: 3.4 MMOL/L (ref 3.4–5.3)
PROT SERPL-MCNC: 7.2 G/DL (ref 6.8–8.8)
PSA SERPL-MCNC: 0.36 UG/L (ref 0–4)
RBC # BLD AUTO: 4.4 10E6/UL (ref 4.4–5.9)
SODIUM SERPL-SCNC: 141 MMOL/L (ref 133–144)
WBC # BLD AUTO: 2.9 10E3/UL (ref 4–11)

## 2021-07-30 PROCEDURE — 343N000001 HC RX 343: Performed by: INTERNAL MEDICINE

## 2021-07-30 PROCEDURE — G0463 HOSPITAL OUTPT CLINIC VISIT: HCPCS | Performed by: PHYSICIAN ASSISTANT

## 2021-07-30 PROCEDURE — 71260 CT THORAX DX C+: CPT

## 2021-07-30 PROCEDURE — 78816 PET IMAGE W/CT FULL BODY: CPT | Mod: PS,MG

## 2021-07-30 PROCEDURE — 71260 CT THORAX DX C+: CPT | Mod: 26

## 2021-07-30 PROCEDURE — 250N000011 HC RX IP 250 OP 636: Performed by: INTERNAL MEDICINE

## 2021-07-30 PROCEDURE — 84153 ASSAY OF PSA TOTAL: CPT

## 2021-07-30 PROCEDURE — A9552 F18 FDG: HCPCS | Performed by: INTERNAL MEDICINE

## 2021-07-30 PROCEDURE — 99214 OFFICE O/P EST MOD 30 MIN: CPT | Performed by: PHYSICIAN ASSISTANT

## 2021-07-30 PROCEDURE — 78816 PET IMAGE W/CT FULL BODY: CPT | Mod: 26

## 2021-07-30 PROCEDURE — 36591 DRAW BLOOD OFF VENOUS DEVICE: CPT

## 2021-07-30 PROCEDURE — 85610 PROTHROMBIN TIME: CPT

## 2021-07-30 PROCEDURE — 74177 CT ABD & PELVIS W/CONTRAST: CPT | Mod: 26

## 2021-07-30 PROCEDURE — G1004 CDSM NDSC: HCPCS | Mod: GC

## 2021-07-30 PROCEDURE — 84403 ASSAY OF TOTAL TESTOSTERONE: CPT | Performed by: INTERNAL MEDICINE

## 2021-07-30 RX ORDER — IOPAMIDOL 755 MG/ML
40-135 INJECTION, SOLUTION INTRAVASCULAR ONCE
Status: COMPLETED | OUTPATIENT
Start: 2021-07-30 | End: 2021-07-30

## 2021-07-30 RX ORDER — HEPARIN SODIUM (PORCINE) LOCK FLUSH IV SOLN 100 UNIT/ML 100 UNIT/ML
500 SOLUTION INTRAVENOUS ONCE
Status: COMPLETED | OUTPATIENT
Start: 2021-07-30 | End: 2021-07-30

## 2021-07-30 RX ADMIN — Medication 500 UNITS: at 14:34

## 2021-07-30 RX ADMIN — FLUDEOXYGLUCOSE F-18 17.79 MCI.: 500 INJECTION, SOLUTION INTRAVENOUS at 13:12

## 2021-07-30 RX ADMIN — IOPAMIDOL 135 ML: 755 INJECTION, SOLUTION INTRAVENOUS at 13:59

## 2021-07-30 ASSESSMENT — PAIN SCALES - GENERAL: PAINLEVEL: NO PAIN (0)

## 2021-07-30 NOTE — NURSING NOTE
"Oncology Rooming Note    July 30, 2021 3:21 PM   Reuben Padilla is a 60 year old male who presents for:    Chief Complaint   Patient presents with     Oncology Clinic Visit     Cancer distal tird of esphagus     Initial Vitals: /78 (BP Location: Right arm, Patient Position: Sitting, Cuff Size: Adult Regular)   Pulse 74   Temp 98.4  F (36.9  C) (Tympanic)   Wt 146.7 kg (323 lb 8 oz)   SpO2 99%   BMI 37.39 kg/m   Estimated body mass index is 37.39 kg/m  as calculated from the following:    Height as of 4/26/21: 1.981 m (6' 5.99\").    Weight as of this encounter: 146.7 kg (323 lb 8 oz). Body surface area is 2.84 meters squared.  No Pain (0) Comment: Data Unavailable   No LMP for male patient.  Allergies reviewed: Yes  Medications reviewed: Yes    Medications: Medication refills not needed today.  Pharmacy name entered into [facility]:    [facility] - [place], [place] - [address] [facility] - [facility] [address]    Clinical concerns: New concerns: discomfort eating-ongoing, discuss scan results.        Sheba Rosales LPN July 30, 2021 3:21 PM                "

## 2021-07-30 NOTE — PROGRESS NOTES
"Hematology/Oncology Visit    Care Team:  - Oncologist: Dr. 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 His tumor is positive for cytokeratin 20, negative for P63 and CK7, and HER2 is negative. PDL1 + weakly.    May 2017: started on FOLFOX (5FU/oxaliplatin). Excellent response via imaging. Delay in treatment from 9/5-10/2/17 due to work related injury. Imaging demonstrated mixed response.  Jan 2018: switched to taxol and cyramza, slight progression and neuropathy, clinical trial became available.     March 2018: started on the [facility] Clinical Trial with crizotinib. (MET amplification) He remained on crizotinib from March - July 2018.  Aug 2018: switched back to taxol/cramza  Oct 2018: switched to Keytruda (PD1 overexpressor)  April 2019: Keytrude x3 weeks and he was treated with prednisone and Enbrel for grade 3 arthralgias. Keytruda was resumed  May 2020: added Actemra weekly decreased to every other week in August d/t fatigue and leukopenia  Aug 2020: moved to q6 week Keytruda (still at the 200 mg dose)  Nov 2020: moved back to q3w Keytruda, CT in Dec showed progression of esophageal mass  Jan 2021: PET showing growth of primary tumor only, started chemoradiation with weekly carbo/taxol and daily radiation  March 2021: completed radiation (received 5000 cGY)    Interval History:  Levi is accompanied by his sister today. He started experiencing dysphagia, more specifically \"food feeling like it's getting stuck\" about 2 weeks ago. He has still been able to eat and drink but has been trying to avoid triggering foods like breads. He denies throat pain or difficult swallowing foods but rather it getting stuck and requiring copious amounts of fluids to feel like it passes into " his stomach. He feels like these are symptoms very similar to when he first presented with his cancer.     Denies any other acute symptomology. He tried tapering himself off of gabapentin and went from 5 capsules per day to 2 but has since noticed worsening of the neuropathy in his legs. He is wondering if he can stop taking warfarin or at least not have his INR checked as frequently. We discussed PET results and he and his sister are comfortable proceeding with a EGD to further assess symptoms.    Medications:  Current Outpatient Medications   Medication     ciclopirox (LOPROX) 0.77 % cream     gabapentin (NEURONTIN) 300 MG capsule     latanoprost (XALATAN) 0.005 % ophthalmic solution     lidocaine-prilocaine (EMLA) 2.5-2.5 % external cream     sucralfate (CARAFATE) 1 GM/10ML suspension     timolol (TIMOPTIC) 0.5 % ophthalmic solution     triamterene-HCTZ (MAXZIDE-25) 37.5-25 MG tablet     warfarin ANTICOAGULANT (COUMADIN) 5 MG tablet     zolpidem (AMBIEN) 10 MG tablet     Physical Exam:   GEN: pleasantly conversant male no acute distress  SKIN: generally intact, no visible rash, bruising, or sores   ENT: eyes non-icteric, no notable oral sores  GI: abd soft without notable hepatosplenomegaly, non-tender to palpation  MSK: no notable lower extremity edema  NEURO: alert and oriented without obvious focal deficit    /78 (BP Location: Right arm, Patient Position: Sitting, Cuff Size: Adult Regular)   Pulse 74   Temp 98.4  F (36.9  C) (Tympanic)   Wt 146.7 kg (323 lb 8 oz)   SpO2 99%   BMI 37.39 kg/m       Wt Readings from Last 4 Encounters:   07/30/21 146.7 kg (323 lb 8 oz)   04/26/21 (!) 154.5 kg (340 lb 11.2 oz)   03/02/21 (!) 157.8 kg (347 lb 12.8 oz)   03/01/21 (!) 157.1 kg (346 lb 4.8 oz)     Labs:  Results for orders placed or performed in visit on 07/30/21   Comprehensive metabolic panel     Status: Abnormal   Result Value Ref Range    Sodium 141 133 - 144 mmol/L    Potassium 3.4 3.4 - 5.3 mmol/L     Chloride 112 (H) 94 - 109 mmol/L    Carbon Dioxide (CO2) 26 20 - 32 mmol/L    Anion Gap 3 3 - 14 mmol/L    Urea Nitrogen 15 7 - 30 mg/dL    Creatinine 0.90 0.66 - 1.25 mg/dL    Calcium 8.7 8.5 - 10.1 mg/dL    Glucose 90 70 - 99 mg/dL    Alkaline Phosphatase 86 40 - 150 U/L    AST 21 0 - 45 U/L    ALT 29 0 - 70 U/L    Protein Total 7.2 6.8 - 8.8 g/dL    Albumin 3.5 3.4 - 5.0 g/dL    Bilirubin Total 0.6 0.2 - 1.3 mg/dL    GFR Estimate >90 >60 mL/min/1.73m2   CBC with platelets and differential     Status: Abnormal   Result Value Ref Range    WBC Count 2.9 (L) 4.0 - 11.0 10e3/uL    RBC Count 4.40 4.40 - 5.90 10e6/uL    Hemoglobin 13.5 13.3 - 17.7 g/dL    Hematocrit 40.0 40.0 - 53.0 %    MCV 91 78 - 100 fL    MCH 30.7 26.5 - 33.0 pg    MCHC 33.8 31.5 - 36.5 g/dL    RDW 13.3 10.0 - 15.0 %    Platelet Count 134 (L) 150 - 450 10e3/uL    NRBCs per 100 WBC 0 <1 /100    Absolute Neutrophils 2.2 1.6 - 8.3 10e3/uL    Absolute Lymphocytes 0.5 (L) 0.8 - 5.3 10e3/uL    Absolute Monocytes 0.2 0.0 - 1.3 10e3/uL    Absolute Eosinophils 0.0 0.0 - 0.7 10e3/uL    Absolute Basophils 0.0 0.0 - 0.2 10e3/uL    Absolute Immature Granulocytes 0.0 <=0.0 10e3/uL    Absolute NRBCs 0.0 10e3/uL   INR     Status: Abnormal   Result Value Ref Range    INR 1.69 (H) 0.85 - 1.15   PSA tumor marker     Status: Normal   Result Value Ref Range    PSA Tumor Marker 0.36 0.00 - 4.00 ug/L     Imaging:  Reviewed PET from 4/26/21:  IMPRESSION: In this patient with a history of esophageal cancer most recently status post chemoradiation:  1. Positive response to treatment. Decreased size and uptake of the mass at the gastroesophageal junction, with remaining uptake slightly above liver background levels. Remaining uptake suggesting mild inflammation, versus trace residual disease.  2. No evidence of metastatic disease. No recurrence of the hypermetabolic lesions in the liver or lymph nodes.  3. Stable pulmonary nodules.  4. Nodular uptake to the prostate, may be  inflammatory, versus neoplasm. Consider correlation with serum PSA.     Reviewed PET scan from 7/20/21:  IMPRESSION: In this patient with history of esophageal cancer status post chemoradiation there treatment response:  1. Compared with PET CT 4/26/2021 there is similar size with no significant hypermetabolism at the known mass of the gastroesophageal junction. No evidence of metastatic disease.  2. Mild increased uptake of the distal esophagus superior to the known gastroesophageal lesion. Favored to be inflammatory or posttreatment changes.  3. Nonobstructing 4 mm right ureteral calculus.  4. Incidental findings as noted in the body of the report.    Assessment and Plan:  Acute dysphagia  Metastatic esophageal cancer  Lymphopenia  Thrombocytopenia  - On observation since completing radiation in March 2021  - Dysphagia symptoms started 2 weeks ago  - PET scan demonstrated mild uptake of the distal esophagus superior to the known GE lesion favored to be inflammatory or post-treatment changes, no significant hypermetabolism at the known mass at the GE junction or evidence of metastatic disease  - Plan for EGD for further assessment  - Follow up with Dr. Dee scheduled for 8/23    Peripheral neuropathy BLE  - Recently self-titrated dose of gabapentin down to 1 tab BID with subsequent worsening neuropathy  - Recommend slowly titrating back up to prior dosing that was controlling his symptoms    History of pulmonary embolism July 2017  Paroxysmal atrial fibrillation  - Continues on warfarin, prefers level be checked every 6 weeks  - INR subtherapeutic today at 1.69      Future Appointments   Date Time Provider Department Center   7/30/2021  3:30 PM Loraine Sutherland PA-C [facility]   8/23/2021  9:00 AM Kadi Dee MD [facility]   8/23/2021 10:20 AM Arnold Forbes DPM [facility]   11/9/2021  8:00 AM Nissen, Rick L, MD [facility]     The total time of this encounter amounted to 30 minutes  today. This time includes face-to-face time spent with the patient, prep work, ordering tests, and performing post-visit documentation.    Linda Alvarenga CNP    I saw patient and performed the ROS and exam myself.  The assessment and plan were mutually discussed and this note was edited to reflect my findings. Levi was very worried when some dysphagia started the last ~2 weeks.  We reviewed actual images of PET.  It appears his prior tumor is still showing no hypermetabolism, however superior to the lesion there is mild inflammation.  Will order an EGD to evaluate.  Levi sold his house and bought an RV, is wanting to get on the road as soon as we can sort this out.  Nicole Sutherland PA-C

## 2021-08-03 ENCOUNTER — TELEPHONE (OUTPATIENT)
Dept: GASTROENTEROLOGY | Facility: OUTPATIENT CENTER | Age: 61
End: 2021-08-03

## 2021-08-03 ENCOUNTER — TELEPHONE (OUTPATIENT)
Dept: ONCOLOGY | Facility: CLINIC | Age: 61
End: 2021-08-03

## 2021-08-03 DIAGNOSIS — Z11.59 ENCOUNTER FOR SCREENING FOR OTHER VIRAL DISEASES: ICD-10-CM

## 2021-08-03 LAB — TESTOST SERPL-MCNC: 230 NG/DL (ref 240–950)

## 2021-08-04 ENCOUNTER — ANTICOAGULATION THERAPY VISIT (OUTPATIENT)
Dept: ANTICOAGULATION | Facility: CLINIC | Age: 61
End: 2021-08-04

## 2021-08-04 ENCOUNTER — TELEPHONE (OUTPATIENT)
Dept: GASTROENTEROLOGY | Facility: CLINIC | Age: 61
End: 2021-08-04

## 2021-08-04 DIAGNOSIS — I26.99 OTHER ACUTE PULMONARY EMBOLISM WITHOUT ACUTE COR PULMONALE (H): ICD-10-CM

## 2021-08-04 DIAGNOSIS — Z86.711 HX OF PULMONARY EMBOLUS: ICD-10-CM

## 2021-08-04 DIAGNOSIS — Z53.9 ERRONEOUS ENCOUNTER--DISREGARD: ICD-10-CM

## 2021-08-04 DIAGNOSIS — I26.99 ACUTE PULMONARY EMBOLISM (H): Primary | ICD-10-CM

## 2021-08-04 NOTE — PROGRESS NOTES
ANTICOAGULATION MANAGEMENT     Reuben Padilla 60 year old male is on warfarin with subtherapeutic INR result. (Goal INR 2.0-3.0)    Recent labs: (last 7 days)     07/30/21  1336   INR 1.69*       ASSESSMENT     Source(s): Chart Review and Patient/Caregiver Call       Warfarin doses taken: Less warfarin taken than planned which may be affecting INR    Diet: No new diet changes identified    New illness, injury, or hospitalization: No    Medication/supplement changes: None noted    Signs or symptoms of bleeding or clotting: No    Previous INR: Therapeutic last visit; previously outside of goal range    Additional findings: Upcoming surgery/procedure EGD on 8/12/21     PLAN     Recommended plan for no diet, medication or health factor changes affecting INR     Dosing Instructions: Requested patient take 7.5mg on Sun and Thurs, 5mg all other days. with next INR in 2 weeks       Summary  As of 8/4/2021    Full warfarin instructions:  8/4: 7.5 mg; 8/7: Hold; 8/8: Hold; 8/9: Hold; 8/10: Hold; 8/11: Hold; 8/12: 12.5 mg; Otherwise 7.5 mg every Sun, Thu; 5 mg all other days   Next INR check:  8/19/2021             Telephone call with Reuben Who verbalized that he has been taking 5mg daily.  Had patient repeat back start Holding on 8/7 for upcoming procedure on 8/12/21.    Patient using outside facility for labs    Education provided: Importance of taking warfarin as instructed    Plan made with [facility] Pharmacist Jessi Rebollar  Levi agreed to take 12.5mg on 8/12 if provider approves him restarting that evening.    Carlos Talbot, RN  Anticoagulation Clinic  8/4/2021    _______________________________________________________________________     Anticoagulation Episode Summary     Current INR goal:  2.0-3.0   TTR:  60.2 % (8.8 mo)   Target end date:  Indefinite   Send INR reminders to:  [facility]    Indications    Acute pulmonary embolism (H) [I26.99]  Hx of pulmonary embolus [Z86.711]  Other acute pulmonary embolism  without acute cor pulmonale (H) [I26.99]           Comments:  [facility] [phone] Fax [phone] (standing order faxed 4/8/21)Takes Warfarin in the am         Anticoagulation Care Providers     Provider Role Specialty Phone number    Kadi Dee MD Referring Hematology & Oncology [phone]

## 2021-08-04 NOTE — PROGRESS NOTES
INR drawn with oncology visit 7/30/31. Please call patient with dosing plan.    Recent labs: (last 7 days)     07/30/21  1336   INR 1.69*       This encounter was opened in error. Please disregard.

## 2021-08-04 NOTE — TELEPHONE ENCOUNTER
Instructions, policy, conscious sedation plan reviewed. Instructed patient to have someone stay with them for 6 hours post exam. Instructed patient to consult his provider about stopping Warfarin 5 days prior to exam. [facility]

## 2021-08-06 ENCOUNTER — TELEPHONE (OUTPATIENT)
Dept: ANTICOAGULATION | Facility: CLINIC | Age: 61
End: 2021-08-06

## 2021-08-06 DIAGNOSIS — I26.99 OTHER ACUTE PULMONARY EMBOLISM WITHOUT ACUTE COR PULMONALE (H): ICD-10-CM

## 2021-08-06 DIAGNOSIS — Z86.711 HX OF PULMONARY EMBOLUS: Primary | ICD-10-CM

## 2021-08-06 NOTE — TELEPHONE ENCOUNTER
"GUILLERMO-PROCEDURAL ANTICOAGULATION  MANAGEMENT    Assessment     Warfarin interruption plan for EGD on 8/12/21.    PE      Risk stratification for thromboembolism: moderate (2012 Chest guidelines)  o Risk factors of worsening clot burden in 2017 while on Xarelto for initial PE and active cancer    VTE: 2016 Anticoagulation Forum clinical guidance recommends, no bridge therapy for most VTE patients interrupting warfarin with possible exceptions for VTE within previous month, prior hx recurrent VTE during anticoagulation therapy interruption, or undergoing a procedure with high for VTE  (joint replacement surgery or major abdominal cancer resection)    Plan         Pre-Procedure:  o Hold warfarin for 5 days until after procedure starting: Saturday, August 7   o Lovenox 150 mg subq Q 12 hrs (1 mg/kg Q 12 hrs for CrCl >= 60 ml/min and BMI <= 40 kg/m2)   - Start Lovenox: Monday, August 9 in the AM  - Last dose of Lovenox prior to procedure: Wednesday, August 11 in the AM  (~24 hours prior to procedure)      Post-Procedure:  o Resume warfarin dose if okay with provider doing procedure on night of procedure, Thursday, August 12 PM: take 12.5 mg x 1, then resume home dose  o Resume Lovenox ~ 24 hrs post procedure when okay with provider doing procedure. Continue until INR >= 2  o Recheck INR 7 days after resuming warfarin   ?   Jessi Rebollar [facility]    Subjective/Objective:      Reuben ESCALANTE Cristobalpaula, a 60 year old male    Goal INR Range: 2.0-3.0     Patient bridged in past: No    Wt Readings from Last 3 Encounters:   07/30/21 146.7 kg (323 lb 8 oz)   04/26/21 (!) 154.5 kg (340 lb 11.2 oz)   03/02/21 (!) 157.8 kg (347 lb 12.8 oz)      Ideal body weight: 91.4 kg (201 lb 7.4 oz)  Adjusted ideal body weight: 113.5 kg (250 lb 4.4 oz)     Estimated body mass index is 37.39 kg/m  as calculated from the following:    Height as of 4/26/21: 1.981 m (6' 5.99\").    Weight as of 7/30/21: 146.7 kg (323 lb 8 oz).    Lab Results   Component Value " Date    INR 1.69 (H) 07/30/2021    INR 2.09 (H) 04/26/2021    INR 1.9 (A) 04/09/2021     Lab Results   Component Value Date    HGB 13.5 07/30/2021    HGB 13.6 04/26/2021    HCT 40.0 07/30/2021    HCT 41.5 04/26/2021     07/30/2021     04/26/2021     Lab Results   Component Value Date    CR 0.90 07/30/2021    CR 1.11 04/26/2021    CR 1.09 03/01/2021     Estimated Creatinine Clearance: 140.1 mL/min (based on SCr of 0.9 mg/dL).

## 2021-08-06 NOTE — TELEPHONE ENCOUNTER
Received the following communication:    Jessi Smooth note with plan for Lovenox in Epic on 8/6/21 presented to patient.  Levi refused Lovenox injections.  This writer informed Fei that patient declined plan for bridging.   FRANCHESKA Worley, SWAPNIL Maradiaga Toby L, RN  Yes please Carlos.  He has some lovenox at home he can use to bridge.  Thanks for checking! Fei           Previous Messages       ----- Message -----   From: Carlos Talbot RN   Sent: 8/5/2021   3:03 PM CDT   To: Loraine Sutherland PA-C   Subject: Reaching out from the Coumadin clinic             Hi!!     Levi has a procedure coming up on 8/12, and he is instructed to Hold his Warfarin for 5 days.  Would you recommend bridging with Lovenox?     Thanks for getting back to us.   Carlos VALERA RN   [phone]

## 2021-08-09 ENCOUNTER — EXTERNAL ORDER RESULTS (OUTPATIENT)
Dept: LAB | Facility: CLINIC | Age: 61
End: 2021-08-09

## 2021-08-12 ENCOUNTER — HOSPITAL ENCOUNTER (OUTPATIENT)
Facility: AMBULATORY SURGERY CENTER | Age: 61
Discharge: HOME | End: 2021-08-12
Attending: STUDENT IN AN ORGANIZED HEALTH CARE EDUCATION/TRAINING PROGRAM | Admitting: STUDENT IN AN ORGANIZED HEALTH CARE EDUCATION/TRAINING PROGRAM
Payer: MEDICARE

## 2021-08-12 VITALS
RESPIRATION RATE: 12 BRPM | OXYGEN SATURATION: 98 % | DIASTOLIC BLOOD PRESSURE: 83 MMHG | HEART RATE: 79 BPM | TEMPERATURE: 97.5 F | SYSTOLIC BLOOD PRESSURE: 110 MMHG | HEIGHT: 78 IN | WEIGHT: 315 LBS | BODY MASS INDEX: 36.45 KG/M2

## 2021-08-12 LAB — UPPER GI ENDOSCOPY: NORMAL

## 2021-08-12 PROCEDURE — 43239 EGD BIOPSY SINGLE/MULTIPLE: CPT

## 2021-08-12 PROCEDURE — 88305 TISSUE EXAM BY PATHOLOGIST: CPT | Mod: TC | Performed by: STUDENT IN AN ORGANIZED HEALTH CARE EDUCATION/TRAINING PROGRAM

## 2021-08-12 RX ORDER — SODIUM CHLORIDE, SODIUM LACTATE, POTASSIUM CHLORIDE, CALCIUM CHLORIDE 600; 310; 30; 20 MG/100ML; MG/100ML; MG/100ML; MG/100ML
INJECTION, SOLUTION INTRAVENOUS CONTINUOUS
Status: DISCONTINUED | OUTPATIENT
Start: 2021-08-12 | End: 2021-08-12 | Stop reason: HOSPADM

## 2021-08-12 RX ORDER — PROCHLORPERAZINE MALEATE 10 MG
10 TABLET ORAL EVERY 6 HOURS PRN
Status: DISCONTINUED | OUTPATIENT
Start: 2021-08-12 | End: 2021-08-14 | Stop reason: HOSPADM

## 2021-08-12 RX ORDER — ONDANSETRON 2 MG/ML
4 INJECTION INTRAMUSCULAR; INTRAVENOUS
Status: DISCONTINUED | OUTPATIENT
Start: 2021-08-12 | End: 2021-08-12 | Stop reason: HOSPADM

## 2021-08-12 RX ORDER — NALOXONE HYDROCHLORIDE 0.4 MG/ML
0.2 INJECTION, SOLUTION INTRAMUSCULAR; INTRAVENOUS; SUBCUTANEOUS
Status: DISCONTINUED | OUTPATIENT
Start: 2021-08-12 | End: 2021-08-14 | Stop reason: HOSPADM

## 2021-08-12 RX ORDER — HEPARIN SODIUM,PORCINE 10 UNIT/ML
5-10 VIAL (ML) INTRAVENOUS
Status: DISCONTINUED | OUTPATIENT
Start: 2021-08-12 | End: 2021-08-14 | Stop reason: HOSPADM

## 2021-08-12 RX ORDER — ONDANSETRON 2 MG/ML
4 INJECTION INTRAMUSCULAR; INTRAVENOUS EVERY 6 HOURS PRN
Status: DISCONTINUED | OUTPATIENT
Start: 2021-08-12 | End: 2021-08-14 | Stop reason: HOSPADM

## 2021-08-12 RX ORDER — HEPARIN SODIUM,PORCINE 10 UNIT/ML
5-10 VIAL (ML) INTRAVENOUS EVERY 24 HOURS
Status: DISCONTINUED | OUTPATIENT
Start: 2021-08-12 | End: 2021-08-14 | Stop reason: HOSPADM

## 2021-08-12 RX ORDER — HEPARIN SODIUM (PORCINE) LOCK FLUSH IV SOLN 100 UNIT/ML 100 UNIT/ML
5-10 SOLUTION INTRAVENOUS
Status: DISCONTINUED | OUTPATIENT
Start: 2021-08-12 | End: 2021-08-14 | Stop reason: HOSPADM

## 2021-08-12 RX ORDER — NALOXONE HYDROCHLORIDE 0.4 MG/ML
0.4 INJECTION, SOLUTION INTRAMUSCULAR; INTRAVENOUS; SUBCUTANEOUS
Status: DISCONTINUED | OUTPATIENT
Start: 2021-08-12 | End: 2021-08-14 | Stop reason: HOSPADM

## 2021-08-12 RX ORDER — ONDANSETRON 4 MG/1
4 TABLET, ORALLY DISINTEGRATING ORAL EVERY 6 HOURS PRN
Status: DISCONTINUED | OUTPATIENT
Start: 2021-08-12 | End: 2021-08-14 | Stop reason: HOSPADM

## 2021-08-12 RX ORDER — LIDOCAINE 40 MG/G
CREAM TOPICAL
Status: DISCONTINUED | OUTPATIENT
Start: 2021-08-12 | End: 2021-08-12 | Stop reason: HOSPADM

## 2021-08-12 RX ORDER — FENTANYL CITRATE 50 UG/ML
INJECTION, SOLUTION INTRAMUSCULAR; INTRAVENOUS PRN
Status: DISCONTINUED | OUTPATIENT
Start: 2021-08-12 | End: 2021-08-12 | Stop reason: HOSPADM

## 2021-08-12 RX ORDER — SIMETHICONE
LIQUID (ML) MISCELLANEOUS PRN
Status: DISCONTINUED | OUTPATIENT
Start: 2021-08-12 | End: 2021-08-12 | Stop reason: HOSPADM

## 2021-08-12 RX ORDER — FLUMAZENIL 0.1 MG/ML
0.2 INJECTION, SOLUTION INTRAVENOUS
Status: DISCONTINUED | OUTPATIENT
Start: 2021-08-12 | End: 2021-08-13 | Stop reason: HOSPADM

## 2021-08-12 RX ADMIN — HEPARIN SODIUM (PORCINE) LOCK FLUSH IV SOLN 100 UNIT/ML 5 ML: 100 SOLUTION at 14:33

## 2021-08-12 ASSESSMENT — MIFFLIN-ST. JEOR: SCORE: 2408.37

## 2021-08-12 NOTE — H&P
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)     Acute pulmonary embolism (H)     Tear of right supraspinatus tendon     Examination of participant in clinical trial     Esophageal obstruction     Paroxysmal A-fib (H)     Orthostatic hypotension     Chemotherapy-induced neutropenia (H)     G tube feedings (H)     Varicose veins of both lower extremities with complications     Traumatic closed fracture of thoracic vertebra with minimal displacement (H)     Malignant neoplasm metastatic to liver (H)     Impotence of organic origin     Edema     Congenital stenosis of esophagus     Hx of pulmonary embolus     Other acute pulmonary embolism without acute cor pulmonale (H)     Morbid obesity (H)       Past Surgical History:    Past Surgical History:   Procedure Laterality Date     AMPUTATION      toe     ARTHROSCOPY KNEE       bunion surgery       CHOLECYSTECTOMY       COLONOSCOPY  02/2017    remove 2 polyps     ESOPHAGOSCOPY, GASTROSCOPY, DUODENOSCOPY (EGD), COMBINED N/A 10/10/2018    Procedure: COMBINED ESOPHAGOSCOPY, GASTROSCOPY, DUODENOSCOPY (EGD);  Esophagogastroduodenoscopy ;  Surgeon: Leonel Joel MD;  Location: [facility] OR     INSERT PORT VASCULAR ACCESS Right 5/9/2017    Procedure: INSERT PORT VASCULAR ACCESS;  Single Lumen Chest Power Port;  Surgeon: Jasiel Hu PA-C;  Location:  OR       Past Medical History:   Past Medical History:   Diagnosis Date     Arrhythmia      Arthritis 5/18/2018     Cancer (H)     esophageal     Coronary artery disease 04/2016    a fib     Glaucoma      Hypertension      Meniere's disease 05/1999    haven't had issues in yrs with this     Paroxysmal A-fib (H)      Reduced vision 07/2008    glaucoma     Tubular adenoma 02/2017       Social History:   Social History     Tobacco Use     Smoking status: Former Smoker      "Packs/day: 1.00     Years: 20.00     Pack years: 20.00     Types: Cigarettes     Start date: 1/21/1978     Quit date: 5/5/2006     Years since quitting: 15.2     Smokeless tobacco: Never Used   Substance Use Topics     Alcohol use: Yes     Comment: one drink a week       Family History:   Family History   Problem Relation Age of Onset     Asthma Mother      Thyroid Disease Mother         ,     Asthma Brother        Allergies:   Allergies   Allergen Reactions     Penicillin G Sodium Unknown     Penicillin G        Active Medications:   Current Outpatient Medications   Medication Sig Dispense Refill     ciclopirox (LOPROX) 0.77 % cream Apply topically 2 times daily 90 g 1     gabapentin (NEURONTIN) 300 MG capsule 1 tablets in the am and 1 tablets before bed 180 capsule 3     latanoprost (XALATAN) 0.005 % ophthalmic solution Place 1 drop into both eyes At Bedtime 1 Bottle 0     triamterene-HCTZ (MAXZIDE-25) 37.5-25 MG tablet 1 tablet       zolpidem (AMBIEN) 10 MG tablet Take 1 tablet (10 mg) by mouth nightly as needed (for sleep) 90 tablet 3     lidocaine-prilocaine (EMLA) 2.5-2.5 % external cream Apply topically as needed for moderate pain 30 g 3     warfarin ANTICOAGULANT (COUMADIN) 5 MG tablet Take 1 tablet (5 mg) by mouth daily 90 tablet 3       Systemic Review:   CONSTITUTIONAL: NEGATIVE for fever, chills, change in weight  ENT/MOUTH: NEGATIVE for ear, mouth and throat problems  RESP: NEGATIVE for significant cough or SOB  CV: NEGATIVE for chest pain, palpitations or peripheral edema    Physical Examination:   Vital Signs: /66   Pulse 77   Temp 97.6  F (36.4  C) (Temporal)   Resp 18   Ht 1.981 m (6' 6\")   Wt 146.5 kg (323 lb)   SpO2 98%   BMI 37.33 kg/m    GENERAL: healthy, alert and no distress  NECK: no adenopathy, no asymmetry, masses, or scars  RESP: lungs clear to auscultation - no rales, rhonchi or wheezes  CV: regular rate and rhythm, normal S1 S2, no S3 or S4, no murmur, click or rub, no " peripheral edema and peripheral pulses strong  ABDOMEN: soft, nontender, no hepatosplenomegaly, no masses and bowel sounds normal  MS: no gross musculoskeletal defects noted, no edema      Plan: Appropriate to proceed as scheduled.      Sadia Reed MD  8/12/2021    PCP:  No Ref-Primary, Physician

## 2021-08-16 LAB
PATH REPORT.COMMENTS IMP SPEC: NORMAL
PATH REPORT.FINAL DX SPEC: NORMAL
PATH REPORT.GROSS SPEC: NORMAL
PATH REPORT.MICROSCOPIC SPEC OTHER STN: NORMAL
PATH REPORT.RELEVANT HX SPEC: NORMAL
PHOTO IMAGE: NORMAL

## 2021-08-16 PROCEDURE — 88305 TISSUE EXAM BY PATHOLOGIST: CPT | Mod: 26 | Performed by: PATHOLOGY

## 2021-08-17 DIAGNOSIS — C15.5 CANCER OF DISTAL THIRD OF ESOPHAGUS (H): Primary | ICD-10-CM

## 2021-08-23 ENCOUNTER — ANTICOAGULATION THERAPY VISIT (OUTPATIENT)
Dept: ANTICOAGULATION | Facility: CLINIC | Age: 61
End: 2021-08-23

## 2021-08-23 ENCOUNTER — OFFICE VISIT (OUTPATIENT)
Dept: ORTHOPEDICS | Facility: CLINIC | Age: 61
End: 2021-08-23
Payer: MEDICARE

## 2021-08-23 ENCOUNTER — ONCOLOGY VISIT (OUTPATIENT)
Dept: ONCOLOGY | Facility: CLINIC | Age: 61
End: 2021-08-23
Attending: INTERNAL MEDICINE
Payer: MEDICARE

## 2021-08-23 VITALS
WEIGHT: 315 LBS | OXYGEN SATURATION: 99 % | BODY MASS INDEX: 36.45 KG/M2 | TEMPERATURE: 97.8 F | HEART RATE: 82 BPM | RESPIRATION RATE: 14 BRPM | HEIGHT: 78 IN | SYSTOLIC BLOOD PRESSURE: 118 MMHG | DIASTOLIC BLOOD PRESSURE: 82 MMHG

## 2021-08-23 DIAGNOSIS — Z86.711 HX OF PULMONARY EMBOLUS: ICD-10-CM

## 2021-08-23 DIAGNOSIS — G62.0 CHEMOTHERAPY-INDUCED NEUROPATHY (H): ICD-10-CM

## 2021-08-23 DIAGNOSIS — I26.99 OTHER ACUTE PULMONARY EMBOLISM WITHOUT ACUTE COR PULMONALE (H): ICD-10-CM

## 2021-08-23 DIAGNOSIS — Z89.9 S/P AMPUTATION: ICD-10-CM

## 2021-08-23 DIAGNOSIS — C15.5 CANCER OF DISTAL THIRD OF ESOPHAGUS (H): ICD-10-CM

## 2021-08-23 DIAGNOSIS — T45.1X5A CHEMOTHERAPY-INDUCED NEUROPATHY (H): ICD-10-CM

## 2021-08-23 DIAGNOSIS — M65.969 TIBIALIS ANTERIOR TENOSYNOVITIS: Primary | ICD-10-CM

## 2021-08-23 DIAGNOSIS — I82.91 CHRONIC EMBOLISM AND THROMBOSIS OF UNSPECIFIED VEIN: ICD-10-CM

## 2021-08-23 DIAGNOSIS — I26.99 ACUTE PULMONARY EMBOLISM (H): Primary | ICD-10-CM

## 2021-08-23 DIAGNOSIS — B35.1 OM (ONYCHOMYCOSIS): ICD-10-CM

## 2021-08-23 DIAGNOSIS — I73.89 OTHER SPECIFIED PERIPHERAL VASCULAR DISEASES (H): ICD-10-CM

## 2021-08-23 DIAGNOSIS — I48.0 PAROXYSMAL A-FIB (H): ICD-10-CM

## 2021-08-23 DIAGNOSIS — R13.14 PHARYNGOESOPHAGEAL DYSPHAGIA: ICD-10-CM

## 2021-08-23 LAB
ALBUMIN SERPL-MCNC: 3.7 G/DL (ref 3.4–5)
ALP SERPL-CCNC: 97 U/L (ref 40–150)
ALT SERPL W P-5'-P-CCNC: 28 U/L (ref 0–70)
ANION GAP SERPL CALCULATED.3IONS-SCNC: 6 MMOL/L (ref 3–14)
AST SERPL W P-5'-P-CCNC: 22 U/L (ref 0–45)
BASOPHILS # BLD AUTO: 0 10E3/UL (ref 0–0.2)
BASOPHILS NFR BLD AUTO: 1 %
BILIRUB SERPL-MCNC: 0.5 MG/DL (ref 0.2–1.3)
BUN SERPL-MCNC: 20 MG/DL (ref 7–30)
CALCIUM SERPL-MCNC: 9 MG/DL (ref 8.5–10.1)
CHLORIDE BLD-SCNC: 110 MMOL/L (ref 94–109)
CO2 SERPL-SCNC: 26 MMOL/L (ref 20–32)
CREAT SERPL-MCNC: 1.03 MG/DL (ref 0.66–1.25)
EOSINOPHIL # BLD AUTO: 0 10E3/UL (ref 0–0.7)
EOSINOPHIL NFR BLD AUTO: 1 %
ERYTHROCYTE [DISTWIDTH] IN BLOOD BY AUTOMATED COUNT: 13 % (ref 10–15)
GFR SERPL CREATININE-BSD FRML MDRD: 79 ML/MIN/1.73M2
GLUCOSE BLD-MCNC: 110 MG/DL (ref 70–99)
HCT VFR BLD AUTO: 42 % (ref 40–53)
HGB BLD-MCNC: 13.7 G/DL (ref 13.3–17.7)
IMM GRANULOCYTES # BLD: 0 10E3/UL
IMM GRANULOCYTES NFR BLD: 0 %
INR PPP: 1.54 (ref 0.85–1.15)
LYMPHOCYTES # BLD AUTO: 0.4 10E3/UL (ref 0.8–5.3)
LYMPHOCYTES NFR BLD AUTO: 13 %
MCH RBC QN AUTO: 30.4 PG (ref 26.5–33)
MCHC RBC AUTO-ENTMCNC: 32.6 G/DL (ref 31.5–36.5)
MCV RBC AUTO: 93 FL (ref 78–100)
MONOCYTES # BLD AUTO: 0.2 10E3/UL (ref 0–1.3)
MONOCYTES NFR BLD AUTO: 7 %
NEUTROPHILS # BLD AUTO: 2.7 10E3/UL (ref 1.6–8.3)
NEUTROPHILS NFR BLD AUTO: 78 %
NRBC # BLD AUTO: 0 10E3/UL
NRBC BLD AUTO-RTO: 0 /100
PLATELET # BLD AUTO: 121 10E3/UL (ref 150–450)
POTASSIUM BLD-SCNC: 4 MMOL/L (ref 3.4–5.3)
PROT SERPL-MCNC: 7.3 G/DL (ref 6.8–8.8)
RBC # BLD AUTO: 4.51 10E6/UL (ref 4.4–5.9)
SODIUM SERPL-SCNC: 142 MMOL/L (ref 133–144)
WBC # BLD AUTO: 3.5 10E3/UL (ref 4–11)

## 2021-08-23 PROCEDURE — 85610 PROTHROMBIN TIME: CPT | Performed by: INTERNAL MEDICINE

## 2021-08-23 PROCEDURE — 85004 AUTOMATED DIFF WBC COUNT: CPT | Performed by: INTERNAL MEDICINE

## 2021-08-23 PROCEDURE — 99215 OFFICE O/P EST HI 40 MIN: CPT | Performed by: INTERNAL MEDICINE

## 2021-08-23 PROCEDURE — G0463 HOSPITAL OUTPT CLINIC VISIT: HCPCS

## 2021-08-23 PROCEDURE — 36591 DRAW BLOOD OFF VENOUS DEVICE: CPT | Performed by: INTERNAL MEDICINE

## 2021-08-23 PROCEDURE — 99213 OFFICE O/P EST LOW 20 MIN: CPT | Performed by: PODIATRIST

## 2021-08-23 PROCEDURE — 82040 ASSAY OF SERUM ALBUMIN: CPT | Performed by: INTERNAL MEDICINE

## 2021-08-23 ASSESSMENT — PAIN SCALES - GENERAL: PAINLEVEL: MILD PAIN (3)

## 2021-08-23 ASSESSMENT — MIFFLIN-ST. JEOR: SCORE: 2408.25

## 2021-08-23 NOTE — PROGRESS NOTES
ANTICOAGULATION MANAGEMENT     Reuben Padilla 60 year old male is on warfarin with subtherapeutic INR result. (Goal INR 2.0-3.0)    Recent labs: (last 7 days)     08/23/21  1010   INR 1.54*       ASSESSMENT     Source(s): Chart Review and Patient/Caregiver Call       Warfarin doses taken: Warfarin taken as instructed    Diet: No new diet changes identified    New illness, injury, or hospitalization: No    Medication/supplement changes: None noted    Signs or symptoms of bleeding or clotting: No    Previous INR: Subtherapeutic    Additional findings: None     PLAN     Recommended plan for no diet, medication or health factor changes affecting INR     Dosing Instructions:  Increase your warfarin dose (12.5% change) with next INR in 1 week       Summary  As of 8/23/2021    Full warfarin instructions:  8/23: 10 mg; Otherwise 7.5 mg every Sun, Thu; 5 mg all other days   Next INR check:  8/30/2021             Telephone call with Reuben who verbalizes understanding and agrees to plan    Patient elected to schedule next visit 8/30    Education provided: Importance of following up for INR monitoring at instructed interval    Plan made per [facility] anticoagulation protocol    Alley Alarcon RN  Anticoagulation Clinic  8/23/2021    _______________________________________________________________________     Anticoagulation Episode Summary     Current INR goal:  2.0-3.0   TTR:  52.1 % (9 mo)   Target end date:  Indefinite   Send INR reminders to:  [facility]    Indications    Acute pulmonary embolism (H) [I26.99]  Hx of pulmonary embolus [Z86.711]  Other acute pulmonary embolism without acute cor pulmonale (H) [I26.99]           Comments:  [facility] [phone] Fax [phone] (standing order faxed 4/8/21)Takes Warfarin in the am         Anticoagulation Care Providers     Provider Role Specialty Phone number    Kadi Dee MD Referring Hematology & Oncology [phone]

## 2021-08-23 NOTE — LETTER
8/23/2021         RE: Reuben Padilla  [address] [facility] [address]        Dear Colleague,    Thank you for referring your patient, Reuben Padilla, to the [facility]. Please see a copy of my visit note below.        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 His tumor is positive for cytokeratin 20, negative for P63 and CK7, and HER2 is negative. PDL1 + weakly.  He started on FOLFOX (5FU/oxaliplatin) on 5/15/2017. He had a delay in treatment from 9/5-10/2/17 due to work related injury.     He had an excellent response by imaging throughout the summer 2017.    He a mixed response by imaging in late fall 2017.    In January 2018, he was switched to taxol and cyramza - had slight progression and neuropathy and clinical trial became available.        In March 2018, he was started on the NCI Match Clinical Trial with crizotinib.  (MET amplification) He remained on crizotinib from March - July 2018.     August 2018, he was switched back to taxol/cramza.  In October 2018, he was switched to Keytruda (PD1 overexpressor).              -April 2019, his infusion was held for three weeks, and he was treated with prednisone and Enbrel for grade 3 arthralgias. Keytruda was resumed              -May 2020 he started Actemra (5/14).  This was initially WEEKLY and then in August- moved to QOW (secondary to fatigue and concerns for low WBC).                -August 2020 moved to q6 week Keytruda (still at the 200 mg dose)              -November 2020 moved back to q3w Keytruda  CT 12/30/2020: progression of esophageal mass   1/14/21: PET showing growth of primary tumor only, discussion of radiation  "  1/28/21: started chemoradiation with weekly carbo/taxol and daily radiation.  Completed 3/3/21 received 5000 cGY     INTERVAL HISTORY:   Levi is feeling much better overall.  He had called in July concerned that his swallowing was getting worse, and he was developing progressive dysphagia.  Today, he reports that he is eating a normal diet.  However, he knows he needs to have liquids with him.  Otherwise, he feels like things do not go quite go back down clearly.    His energy is improving.  He continues to have difficulty with neuropathy.  He reports some numbness and tingling from his knees on down.  More recently he noticed an episode of sharp pain in his LLE; this then resolved spontaneously.      He previously has had issues with vertigo and Meniere's disease.  Overall, this is stable.     No fevers or chills.  No chest pain or shortness of breath.  He has had no problems with bleeding.        He remains on Coumadin with his INR being managed at [facility]    Following his dysphagia, he had a PET and then EGD.  The EGD demonstrated chronic gastritis.  He is now on prilosec.         ROS: 10 point ROS neg other than the symptoms noted above in the HPI.     PHYSICAL EXAMINATION  Wt Readings from Last 4 Encounters:   08/23/21 146.5 kg (322 lb 15.6 oz)   08/12/21 146.5 kg (323 lb)   07/30/21 146.7 kg (323 lb 8 oz)   04/26/21 (!) 154.5 kg (340 lb 11.2 oz)     /82   Pulse 82   Temp 97.8  F (36.6  C) (Oral)   Resp 14   Ht 1.981 m (6' 6\")   Wt 146.5 kg (322 lb 15.6 oz)   SpO2 99%   BMI 37.32 kg/m    Vital signs were reviewed.   Patient alert and oriented x3.   PERRLA. EOMI. No scleral icterus noted.   Neck exam: No palpable cervical, supraclavicular or axillary nodes bilaterally.   Heart:Mildly tachy, no murmur   Lungs: clear to auscultation bilaterally.  No crackles or wheezing.   Abd: positive bowel sounds in all four quadrants.  No tenderness to palpation.  No hepatomegaly.   Extremities: No lower " extremity edema, venous stasis discoloration to bilateral legs  Neuro: grossly intact. Absent reflexes from knees to below.  Mood and affect is stable.       Labs:        Lab Results   Component Value Date    WBC 3.5 08/23/2021    WBC 3.4 04/26/2021     Lab Results   Component Value Date    RBC 4.51 08/23/2021    RBC 4.56 04/26/2021     Lab Results   Component Value Date    HGB 13.7 08/23/2021    HGB 13.6 04/26/2021     Lab Results   Component Value Date    HCT 42.0 08/23/2021    HCT 41.5 04/26/2021     No components found for: MCT  Lab Results   Component Value Date    MCV 93 08/23/2021    MCV 91 04/26/2021     Lab Results   Component Value Date    MCH 30.4 08/23/2021    MCH 29.8 04/26/2021     Lab Results   Component Value Date    MCHC 32.6 08/23/2021    MCHC 32.8 04/26/2021     Lab Results   Component Value Date    RDW 13.0 08/23/2021    RDW 12.7 04/26/2021     Lab Results   Component Value Date     08/23/2021     04/26/2021       Recent Labs   Lab Test 08/23/21  1010 07/30/21  1336    141   POTASSIUM 4.0 3.4   CHLORIDE 110* 112*   CO2 26 26   ANIONGAP 6 3   * 90   BUN 20 15   CR 1.03 0.90   NIDHI 9.0 8.7     Liver Function Studies - Recent Labs   Lab Test 08/23/21  1010   PROTTOTAL 7.3   ALBUMIN 3.7   BILITOTAL 0.5   ALKPHOS 97   AST 22   ALT 28     PET/CT from end of July:  Reviewed demonstrating overall stable disease    EGD:  Erosive chronic gastritis, no malignancy    Reviewed his labs above today and his PET scan scan personally with radiology today.    IMPRESSION/PLAN:  1. 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 He has stable disease on Keytruda, for TWO years, which is great news and is now s/p chemoradiation to the primary tumor.  He has had an excellent response to chemoradiation.  I reviewed his PET scan with him and advised that we continue to monitor him off of therapy for now.  I also reviewed his EGD demonstrating gastritis.  We discussed  there is a small chance there can still be disease present.  I would like him to have repeat imaging, and an EGD in October.       2. Esophagitis -   still eating solids, will continue PPI BID.  Continue carafate TID prn.       3. Immune related arthralgias- resolved off of Keytruda.     4. H/o PE 2018.   He has progressed through Xarelto in the past (didn't take it regularly).  He was then on lovenox but his insurance changed and this became unaffordable. He is now on Coumadin and labs drawn  at [facility] in [place] fax ([phone]).  He is monitored by our coumadin clinic.  Will check INR today.     5. Baseline neuropathy is marginally worse in his legs with just numbness, none in his hands.  Continues on gabapentin     6.  afib.  He is on anticoagulation.  He is not in RVR.  He has a history of afib.  Advised he follow up with primary care     40 minutes spent on the date of the encounter doing chart review, review of test results, interpretation of tests, patient visit and documentation       Kadi Dee MD

## 2021-08-23 NOTE — NURSING NOTE
"Oncology Rooming Note    August 23, 2021 8:50 AM   Reuben Padilla is a 60 year old male who presents for:    Chief Complaint   Patient presents with     Oncology Clinic Visit     ESOPHAGEAL CANCER      Initial Vitals: /82   Pulse 82   Temp 97.8  F (36.6  C) (Oral)   Resp 14   Ht 1.981 m (6' 6\")   Wt 146.5 kg (322 lb 15.6 oz)   SpO2 99%   BMI 37.32 kg/m   Estimated body mass index is 37.32 kg/m  as calculated from the following:    Height as of this encounter: 1.981 m (6' 6\").    Weight as of this encounter: 146.5 kg (322 lb 15.6 oz). Body surface area is 2.84 meters squared.  Mild Pain (3) Comment: Data Unavailable   No LMP for male patient.  Allergies reviewed: Yes  Medications reviewed: Yes    Medications: Medication refills not needed today.  Pharmacy name entered into [facility]:    [facility] - [place], [place] - [address] [facility] [address] [facility] - [facility] [address]    Clinical concerns: No new concerns today      Suha Huerta CMA August 23, 2021  8:50 AM             "

## 2021-08-23 NOTE — LETTER
8/23/2021         RE: Reuben Padilla  [address] [facility] [address]        Dear Colleague,    Thank you for referring your patient, Reuben Padilla, to the [facility] [place]. Please see a copy of my visit note below.    Chief Complaint   Patient presents with     Follow Up     nail mycosis         HPI: Reuben is a 60 year old male who presents today for further evaluation for mycotic nails and orthotics. He has been using the Loprox. Nails are elongated and thick. He does have neuropathy. Relates that he recently sold his house and will now be travelling.  He relates that he is getting some pain in the anterior right leg. This occurs with walking. He has trimmed his nails and some toes have bled.     Review of Systems: No n/v/d/f/c/ns/sob/cp    PMH:   Past Medical History:   Diagnosis Date     Arrhythmia      Arthritis 5/18/2018     Cancer (H)     esophageal     Coronary artery disease 04/2016    a fib     Glaucoma      Hypertension      Meniere's disease 05/1999    haven't had issues in yrs with this     Paroxysmal A-fib (H)      Reduced vision 07/2008    glaucoma     Tubular adenoma 02/2017       PSxH:   Past Surgical History:   Procedure Laterality Date     AMPUTATION      toe     ARTHROSCOPY KNEE       bunion surgery       CHOLECYSTECTOMY       COLONOSCOPY  02/2017    remove 2 polyps     ESOPHAGOSCOPY, GASTROSCOPY, DUODENOSCOPY (EGD), COMBINED N/A 10/10/2018    Procedure: COMBINED ESOPHAGOSCOPY, GASTROSCOPY, DUODENOSCOPY (EGD);  Esophagogastroduodenoscopy ;  Surgeon: Leonel Joel MD;  Location:  OR     ESOPHAGOSCOPY, GASTROSCOPY, DUODENOSCOPY (EGD), COMBINED N/A 8/12/2021    Procedure: ESOPHAGOGASTRODUODENOSCOPY, WITH BIOPSY;  Surgeon: Sadia Reed MD;  Location: [facility] OR     INSERT PORT VASCULAR ACCESS Right 5/9/2017    Procedure: INSERT PORT VASCULAR ACCESS;  Single Lumen Chest Power Port;  Surgeon: Jasiel Hu PA-C;  Location:  OR        Allergies: Penicillin g sodium and Penicillin g    SH:   Social History     Socioeconomic History     Marital status: Single     Spouse name: Not on file     Number of children: Not on file     Years of education: Not on file     Highest education level: Not on file   Occupational History     Not on file   Tobacco Use     Smoking status: Former Smoker     Packs/day: 1.00     Years: 20.00     Pack years: 20.00     Types: Cigarettes     Start date: 1/21/1978     Quit date: 5/5/2006     Years since quitting: 15.3     Smokeless tobacco: Never Used   Substance and Sexual Activity     Alcohol use: Yes     Comment: one drink a week     Drug use: No     Comment: h/o over 30 years ago     Sexual activity: Not Currently     Partners: Female     Birth control/protection: Condom   Other Topics Concern     Parent/sibling w/ CABG, MI or angioplasty before 65F 55M? Not Asked   Social History Narrative     Not on file     Social Determinants of Health     Financial Resource Strain:      Difficulty of Paying Living Expenses:    Food Insecurity:      Worried About Running Out of Food in the Last Year:      Ran Out of Food in the Last Year:    Transportation Needs:      Lack of Transportation (Medical):      Lack of Transportation (Non-Medical):    Physical Activity:      Days of Exercise per Week:      Minutes of Exercise per Session:    Stress:      Feeling of Stress :    Social Connections:      Frequency of Communication with Friends and Family:      Frequency of Social Gatherings with Friends and Family:      Attends Roman [employer] Services:      Active Member of Clubs or Organizations:      Attends Club or Organization Meetings:      Marital Status:    Intimate Partner Violence:      Fear of Current or Ex-Partner:      Emotionally Abused:      Physically Abused:      Sexually Abused:        FH:   Family History   Problem Relation Age of Onset     Asthma Mother      Thyroid Disease Mother         ,     Asthma Brother         Objective:  Data Unavailable Data Unavailable Data Unavailable Data Unavailable Data Unavailable 0 lbs 0 oz    PT and DP pulses are 2/4 bilaterally. CRT is instant. Diminished pedal hair. Varicosities noted BL.   Gross sensation is diminished bilaterally.  Protective sensation is diminished as tested with a 5.07G monofilament.  Equinus is noted bilaterally. No pain with active or passive ROM of the ankle, MTJ, 1st ray, or halluces bilaterally. Pain noted today with palpation of the tibialis anterior from the muscle belly in the leg to the tendon as it crosses the ankle. No pain with MMT, which is 5/5.  Right hallux amputation noted.  With ambulation he does have slight forefoot varus at the mid stance.  Nails mycotic bilaterally.  No open lesions are noted. Vesicular rash on a red base on BL feet. Lacerations noted to BL lesser digits 2/2 self nail debridement.     Assessment: Levi is a 60-year-old with chemotherapy-induced neuropathy and right hallux amputation.    Onychomycosis  PVD  Tinea pedis.   TA tendonitis    Plan:  - Pt seen and evaluated.  - Cont with orthotics.   - Loprox topically for the tinea.   - PT for the TA tendonitis. I did provide him with some stretches.  - He should refrain from self debriding nails.   - See again in 9 - 10 weeks.            Arnold Forbes DPM

## 2021-08-23 NOTE — PROGRESS NOTES
Chief Complaint   Patient presents with     Follow Up     nail mycosis         HPI: Reuben is a 60 year old male who presents today for further evaluation for mycotic nails and orthotics. He has been using the Loprox. Nails are elongated and thick. He does have neuropathy. Relates that he recently sold his house and will now be travelling.  He relates that he is getting some pain in the anterior right leg. This occurs with walking. He has trimmed his nails and some toes have bled.     Review of Systems: No n/v/d/f/c/ns/sob/cp    PMH:   Past Medical History:   Diagnosis Date     Arrhythmia      Arthritis 5/18/2018     Cancer (H)     esophageal     Coronary artery disease 04/2016    a fib     Glaucoma      Hypertension      Meniere's disease 05/1999    haven't had issues in yrs with this     Paroxysmal A-fib (H)      Reduced vision 07/2008    glaucoma     Tubular adenoma 02/2017       PSxH:   Past Surgical History:   Procedure Laterality Date     AMPUTATION      toe     ARTHROSCOPY KNEE       bunion surgery       CHOLECYSTECTOMY       COLONOSCOPY  02/2017    remove 2 polyps     ESOPHAGOSCOPY, GASTROSCOPY, DUODENOSCOPY (EGD), COMBINED N/A 10/10/2018    Procedure: COMBINED ESOPHAGOSCOPY, GASTROSCOPY, DUODENOSCOPY (EGD);  Esophagogastroduodenoscopy ;  Surgeon: Leonel Joel MD;  Location:  OR     ESOPHAGOSCOPY, GASTROSCOPY, DUODENOSCOPY (EGD), COMBINED N/A 8/12/2021    Procedure: ESOPHAGOGASTRODUODENOSCOPY, WITH BIOPSY;  Surgeon: Sadia Reed MD;  Location: [facility] OR     INSERT PORT VASCULAR ACCESS Right 5/9/2017    Procedure: INSERT PORT VASCULAR ACCESS;  Single Lumen Chest Power Port;  Surgeon: Jasiel Hu PA-C;  Location:  OR       Allergies: Penicillin g sodium and Penicillin g    SH:   Social History     Socioeconomic History     Marital status: Single     Spouse name: Not on file     Number of children: Not on file     Years of education: Not on file     Highest education level: Not on  file   Occupational History     Not on file   Tobacco Use     Smoking status: Former Smoker     Packs/day: 1.00     Years: 20.00     Pack years: 20.00     Types: Cigarettes     Start date: 1/21/1978     Quit date: 5/5/2006     Years since quitting: 15.3     Smokeless tobacco: Never Used   Substance and Sexual Activity     Alcohol use: Yes     Comment: one drink a week     Drug use: No     Comment: h/o over 30 years ago     Sexual activity: Not Currently     Partners: Female     Birth control/protection: Condom   Other Topics Concern     Parent/sibling w/ CABG, MI or angioplasty before 65F 55M? Not Asked   Social History Narrative     Not on file     Social Determinants of Health     Financial Resource Strain:      Difficulty of Paying Living Expenses:    Food Insecurity:      Worried About Running Out of Food in the Last Year:      Ran Out of Food in the Last Year:    Transportation Needs:      Lack of Transportation (Medical):      Lack of Transportation (Non-Medical):    Physical Activity:      Days of Exercise per Week:      Minutes of Exercise per Session:    Stress:      Feeling of Stress :    Social Connections:      Frequency of Communication with Friends and Family:      Frequency of Social Gatherings with Friends and Family:      Attends Zoroastrian Services:      Active Member of Clubs or Organizations:      Attends Club or Organization Meetings:      Marital Status:    Intimate Partner Violence:      Fear of Current or Ex-Partner:      Emotionally Abused:      Physically Abused:      Sexually Abused:        FH:   Family History   Problem Relation Age of Onset     Asthma Mother      Thyroid Disease Mother         ,     Asthma Brother        Objective:  Data Unavailable Data Unavailable Data Unavailable Data Unavailable Data Unavailable 0 lbs 0 oz    PT and DP pulses are 2/4 bilaterally. CRT is instant. Diminished pedal hair. Varicosities noted BL.   Gross sensation is diminished bilaterally.  Protective  sensation is diminished as tested with a 5.07G monofilament.  Equinus is noted bilaterally. No pain with active or passive ROM of the ankle, MTJ, 1st ray, or halluces bilaterally. Pain noted today with palpation of the tibialis anterior from the muscle belly in the leg to the tendon as it crosses the ankle. No pain with MMT, which is 5/5.  Right hallux amputation noted.  With ambulation he does have slight forefoot varus at the mid stance.  Nails mycotic bilaterally.  No open lesions are noted. Vesicular rash on a red base on BL feet. Lacerations noted to BL lesser digits 2/2 self nail debridement.     Assessment: Levi is a 60-year-old with chemotherapy-induced neuropathy and right hallux amputation.    Onychomycosis  PVD  Tinea pedis.   TA tendonitis    Plan:  - Pt seen and evaluated.  - Cont with orthotics.   - Loprox topically for the tinea.   - PT for the TA tendonitis. I did provide him with some stretches.  - He should refrain from self debriding nails.   - See again in 9 - 10 weeks.

## 2021-08-24 NOTE — PROGRESS NOTES
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 His tumor is positive for cytokeratin 20, negative for P63 and CK7, and HER2 is negative. PDL1 + weakly.  He started on FOLFOX (5FU/oxaliplatin) on 5/15/2017. He had a delay in treatment from 9/5-10/2/17 due to work related injury.     He had an excellent response by imaging throughout the summer 2017.    He a mixed response by imaging in late fall 2017.    In January 2018, he was switched to taxol and cyramza - had slight progression and neuropathy and clinical trial became available.        In March 2018, he was started on the [facility] Match Clinical Trial with crizotinib.  (MET amplification) He remained on crizotinib from March - July 2018.     August 2018, he was switched back to taxol/cramza.  In October 2018, he was switched to Keytruda (PD1 overexpressor).              -April 2019, his infusion was held for three weeks, and he was treated with prednisone and Enbrel for grade 3 arthralgias. Keytruda was resumed              -May 2020 he started Actemra (5/14).  This was initially WEEKLY and then in August- moved to QOW (secondary to fatigue and concerns for low WBC).                -August 2020 moved to q6 week Keytruda (still at the 200 mg dose)              -November 2020 moved back to q3w Keytruda  CT 12/30/2020: progression of esophageal mass   1/14/21: PET showing growth of primary tumor only, discussion of radiation   1/28/21: started chemoradiation with weekly carbo/taxol and daily radiation.  Completed 3/3/21 received 5000 cGY     INTERVAL HISTORY:   Levi is feeling much better overall.  He had called in July concerned that his swallowing was getting worse, and he was  "developing progressive dysphagia.  Today, he reports that he is eating a normal diet.  However, he knows he needs to have liquids with him.  Otherwise, he feels like things do not go quite go back down clearly.    His energy is improving.  He continues to have difficulty with neuropathy.  He reports some numbness and tingling from his knees on down.  More recently he noticed an episode of sharp pain in his LLE; this then resolved spontaneously.      He previously has had issues with vertigo and Meniere's disease.  Overall, this is stable.     No fevers or chills.  No chest pain or shortness of breath.  He has had no problems with bleeding.        He remains on Coumadin with his INR being managed at [place]    Following his dysphagia, he had a PET and then EGD.  The EGD demonstrated chronic gastritis.  He is now on prilosec.         ROS: 10 point ROS neg other than the symptoms noted above in the HPI.     PHYSICAL EXAMINATION  Wt Readings from Last 4 Encounters:   08/23/21 146.5 kg (322 lb 15.6 oz)   08/12/21 146.5 kg (323 lb)   07/30/21 146.7 kg (323 lb 8 oz)   04/26/21 (!) 154.5 kg (340 lb 11.2 oz)     /82   Pulse 82   Temp 97.8  F (36.6  C) (Oral)   Resp 14   Ht 1.981 m (6' 6\")   Wt 146.5 kg (322 lb 15.6 oz)   SpO2 99%   BMI 37.32 kg/m    Vital signs were reviewed.   Patient alert and oriented x3.   PERRLA. EOMI. No scleral icterus noted.   Neck exam: No palpable cervical, supraclavicular or axillary nodes bilaterally.   Heart:Mildly tachy, no murmur   Lungs: clear to auscultation bilaterally.  No crackles or wheezing.   Abd: positive bowel sounds in all four quadrants.  No tenderness to palpation.  No hepatomegaly.   Extremities: No lower extremity edema, venous stasis discoloration to bilateral legs  Neuro: grossly intact. Absent reflexes from knees to below.  Mood and affect is stable.       Labs:        Lab Results   Component Value Date    WBC 3.5 08/23/2021    WBC 3.4 04/26/2021     Lab " Results   Component Value Date    RBC 4.51 08/23/2021    RBC 4.56 04/26/2021     Lab Results   Component Value Date    HGB 13.7 08/23/2021    HGB 13.6 04/26/2021     Lab Results   Component Value Date    HCT 42.0 08/23/2021    HCT 41.5 04/26/2021     No components found for: MCT  Lab Results   Component Value Date    MCV 93 08/23/2021    MCV 91 04/26/2021     Lab Results   Component Value Date    MCH 30.4 08/23/2021    MCH 29.8 04/26/2021     Lab Results   Component Value Date    MCHC 32.6 08/23/2021    MCHC 32.8 04/26/2021     Lab Results   Component Value Date    RDW 13.0 08/23/2021    RDW 12.7 04/26/2021     Lab Results   Component Value Date     08/23/2021     04/26/2021       Recent Labs   Lab Test 08/23/21  1010 07/30/21  1336    141   POTASSIUM 4.0 3.4   CHLORIDE 110* 112*   CO2 26 26   ANIONGAP 6 3   * 90   BUN 20 15   CR 1.03 0.90   NIDHI 9.0 8.7     Liver Function Studies - Recent Labs   Lab Test 08/23/21  1010   PROTTOTAL 7.3   ALBUMIN 3.7   BILITOTAL 0.5   ALKPHOS 97   AST 22   ALT 28     PET/CT from end of July:  Reviewed demonstrating overall stable disease    EGD:  Erosive chronic gastritis, no malignancy    Reviewed his labs above today and his PET scan scan personally with radiology today.    IMPRESSION/PLAN:  1. 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 He has stable disease on Keytruda, for TWO years, which is great news and is now s/p chemoradiation to the primary tumor.  He has had an excellent response to chemoradiation.  I reviewed his PET scan with him and advised that we continue to monitor him off of therapy for now.  I also reviewed his EGD demonstrating gastritis.  We discussed there is a small chance there can still be disease present.  I would like him to have repeat imaging, and an EGD in October.       2. Esophagitis -   still eating solids, will continue PPI BID.  Continue carafate TID prn.       3. Immune related arthralgias-  resolved off of Keytruda.     4. H/o PE 2018.   He has progressed through Xarelto in the past (didn't take it regularly).  He was then on lovenox but his insurance changed and this became unaffordable. He is now on Coumadin and labs drawn  at [facility] in [place] fax ([phone]).  He is monitored by our coumadin clinic.  Will check INR today.     5. Baseline neuropathy is marginally worse in his legs with just numbness, none in his hands.  Continues on gabapentin     6.  afib.  He is on anticoagulation.  He is not in RVR.  He has a history of afib.  Advised he follow up with primary care     40 minutes spent on the date of the encounter doing chart review, review of test results, interpretation of tests, patient visit and documentation       Kadi Dee MD

## 2021-08-27 ENCOUNTER — TRANSFERRED RECORDS (OUTPATIENT)
Dept: HEALTH INFORMATION MANAGEMENT | Facility: CLINIC | Age: 61
End: 2021-08-27

## 2021-09-04 ENCOUNTER — HEALTH MAINTENANCE LETTER (OUTPATIENT)
Age: 61
End: 2021-09-04

## 2021-09-07 ENCOUNTER — TELEPHONE (OUTPATIENT)
Dept: GASTROENTEROLOGY | Facility: OUTPATIENT CENTER | Age: 61
End: 2021-09-07

## 2021-09-07 ENCOUNTER — HOSPITAL ENCOUNTER (OUTPATIENT)
Facility: AMBULATORY SURGERY CENTER | Age: 61
End: 2021-09-07
Attending: INTERNAL MEDICINE
Payer: MEDICARE

## 2021-09-07 NOTE — TELEPHONE ENCOUNTER
Scheduling Details    Which Colonoscopy Prep was Sent?: n/a  Procedure Scheduled: egd  Provider/Surgeon: cory  Date of Procedure: 11/9  Location: [facility]  Caller (Please ask for phone number if not scheduled by patient): patient      Sedation Type: cs  Conscious Sedation- Needs  for 6 hours after the procedure  MAC/General-Needs  for 24 hours after procedure    Pre-op Required at [facility], [place], [place] and OR for MAC sedation:   (if yes advise patient they will need a pre-op prior to procedure)      Is patient on blood thinners? -y (If yes- inform patient to follow up with PCP or provider for follow up instructions)     Informed patient they will need an adult  y  Cannot take any type of public or medical transportation alone    Informed Patient of COVID Test Requirement y    Confirmed Nurse will call to complete assessment     Additional comments: Staff message sent to Dr. Reed for approval to see cory (pending).

## 2021-09-10 ENCOUNTER — TELEPHONE (OUTPATIENT)
Dept: GASTROENTEROLOGY | Facility: CLINIC | Age: 61
End: 2021-09-10

## 2021-09-10 NOTE — TELEPHONE ENCOUNTER
Caller: Levi Padilla     Procedure: egd-     Date of Procedure Cancelled: 11/9     Ordering Provider:Sadia Reed MD     Reason for cancel: scheduled incorrectly with a provider who does not perform egd's     Any Staff messages sent regarding case?: n                       Rescheduled:Yes                If rescheduled:              Date: 11/10/2021             Location: [facility] [place]             Note any change or update to original order/sedation: NO

## 2021-09-10 NOTE — TELEPHONE ENCOUNTER
Caller: us calling pt to reschedule due to scheduling error; scheduled incorrectly with a provider who does not perform egd's    Procedure: egd-    Date of Procedure Cancelled: 11/9    Ordering Provider:Sadia Reed MD    Reason for cancel: scheduled incorrectly with a provider who does not perform egd's    Any Staff messages sent regarding case?: n                     Rescheduled: lvm for pt to call us back to reschedule; reschedule with a heptalogist specialist     If rescheduled:    Date:    Location:    Note any change or update to original order/sedation:

## 2021-09-11 ENCOUNTER — MYC MEDICAL ADVICE (OUTPATIENT)
Dept: ONCOLOGY | Facility: CLINIC | Age: 61
End: 2021-09-11

## 2021-09-11 DIAGNOSIS — G62.0 CHEMOTHERAPY-INDUCED NEUROPATHY (H): ICD-10-CM

## 2021-09-11 DIAGNOSIS — T45.1X5A CHEMOTHERAPY-INDUCED NEUROPATHY (H): ICD-10-CM

## 2021-09-13 RX ORDER — GABAPENTIN 300 MG/1
CAPSULE ORAL
Qty: 540 CAPSULE | Refills: 3 | Status: SHIPPED | OUTPATIENT
Start: 2021-09-13 | End: 2022-01-27

## 2021-09-13 NOTE — TELEPHONE ENCOUNTER
Will check on warfarin prescription and have it transferred to [facility] pharmacy.   Will check on Ambien prescription as he should have 3 refills left on that prescription at [facility].     Gabapentin   Last prescribing provider: Dr Dee    Last clinic visit date: 7/30/21 Nicole Sutherland     Any missed appointments or no-shows since last clinic visit?: No     Recommendations for requested medication (if none, N/A): Copied from chart note 7/30/21 Nicole Sutherland  Peripheral neuropathy BLE  - Recently self-titrated dose of gabapentin down to 1 tab BID with subsequent worsening neuropathy  - Recommend slowly titrating back up to prior dosing that was controlling his symptoms    Next clinic visit date: 11/8/21 Dr Dee     Any other pertinent information (if none, N/A):   Currently taking 3-300mg tabs in AM and 3-300mg tabs at HS.     540 tabs for 3 month supply. Would like a 3 month supply and 3 refills.     Would like it sent to [facility] mail order pharmacy.

## 2021-09-27 ENCOUNTER — TELEPHONE (OUTPATIENT)
Dept: ANTICOAGULATION | Facility: CLINIC | Age: 61
End: 2021-09-27

## 2021-09-27 ENCOUNTER — ANTICOAGULATION THERAPY VISIT (OUTPATIENT)
Dept: ANTICOAGULATION | Facility: CLINIC | Age: 61
End: 2021-09-27
Payer: COMMERCIAL

## 2021-09-27 DIAGNOSIS — I26.99 OTHER ACUTE PULMONARY EMBOLISM WITHOUT ACUTE COR PULMONALE (H): ICD-10-CM

## 2021-09-27 DIAGNOSIS — Z91.199 FAILURE TO ATTEND APPOINTMENT: ICD-10-CM

## 2021-09-27 DIAGNOSIS — Z86.711 HX OF PULMONARY EMBOLUS: Primary | ICD-10-CM

## 2021-09-27 NOTE — TELEPHONE ENCOUNTER
ANTICOAGULATION     Reuben Padilla is overdue for INR check.      Left message for patient to call and schedule lab appointment as soon as possible. If returning call, please schedule.      Due 8/30/21    Penelope Haider RN

## 2021-10-06 DIAGNOSIS — Z11.59 ENCOUNTER FOR SCREENING FOR OTHER VIRAL DISEASES: ICD-10-CM

## 2021-10-12 ENCOUNTER — TELEPHONE (OUTPATIENT)
Dept: ANTICOAGULATION | Facility: CLINIC | Age: 61
End: 2021-10-12

## 2021-10-12 NOTE — TELEPHONE ENCOUNTER
ANTICOAGULATION     Reuben Padilla is overdue for INR check.      Spoke with Levi and scheduled lab appointment on 11/8/21. Levi is currently in [place] and will be flying back for 4 days for some appts. INR lab appt scheduled.     ISABEL GONZALEZ RN

## 2021-10-31 DIAGNOSIS — Z86.711 HX OF PULMONARY EMBOLUS: ICD-10-CM

## 2021-10-31 DIAGNOSIS — I26.99 OTHER ACUTE PULMONARY EMBOLISM WITHOUT ACUTE COR PULMONALE (H): ICD-10-CM

## 2021-10-31 DIAGNOSIS — I26.99 ACUTE PULMONARY EMBOLISM (H): ICD-10-CM

## 2021-11-01 ENCOUNTER — TELEPHONE (OUTPATIENT)
Dept: GASTROENTEROLOGY | Facility: CLINIC | Age: 61
End: 2021-11-01

## 2021-11-01 ENCOUNTER — TELEPHONE (OUTPATIENT)
Dept: ONCOLOGY | Facility: CLINIC | Age: 61
End: 2021-11-01

## 2021-11-01 DIAGNOSIS — C79.9 METASTASIS FROM GASTRIC CANCER (H): ICD-10-CM

## 2021-11-01 DIAGNOSIS — C16.9 METASTASIS FROM GASTRIC CANCER (H): ICD-10-CM

## 2021-11-01 NOTE — TELEPHONE ENCOUNTER
Pre assessment questions completed for upcoming EGD procedure scheduled on 11/10/21    COVID test scheduled 11/8/21    Procedural arrival time and facility location reviewed.    Designated  policy reviewed. Instructed to have someone stay 6 hours post procedure.     Reviewed EGD prep instructions with patient. NPO six hours prior procedure.     Anticoagulation/blood thinners? Warfarin. Patient to consult with provider regarding holding interval and if bridging is required.     Electronic implanted devices? no    Patient verbalized understanding and had no questions or concerns at this time.    Lexy Whaley RN

## 2021-11-01 NOTE — CONFIDENTIAL NOTE
Was told by gastroenterology that he needs to stop his coumadin 5 days prior to having his endoscopy.   Wondering if this is what he needs to do?   Also wondering if he needs to do a bridge of lovenox during the time that he is off the coumadin. Does not want to do bridge as he has to pay out of pocket for Lovenox.   Advised to do what he was told by the Gastroenterologist and to keep his appointment to get his blood checked by the coumadin clinic on Monday prior to his endoscopy.   Then to do what the coumadin clinic tells him to do to get his INR within therapeutic range after his procedure.

## 2021-11-01 NOTE — PROGRESS NOTES
Spoke to Levi to confirm he should follow the recommendations on holding coumadin for his procedure.

## 2021-11-01 NOTE — TELEPHONE ENCOUNTER
Patient scheduled for EGD on 11/10/21.     Covid test scheduled: 11/8/21    Arrival time: 0800    Facility location: [facility]    Sedation type: CS    Indication for procedure: follow up atypical cells    Anticoagulations? Warfarin Holding interval of 5 days    Pre visit planning completed.    Lexy Whaley RN

## 2021-11-03 RX ORDER — WARFARIN SODIUM 5 MG/1
TABLET ORAL
Qty: 90 TABLET | Refills: 3 | Status: SHIPPED | OUTPATIENT
Start: 2021-11-03 | End: 2022-01-27

## 2021-11-03 NOTE — CONFIDENTIAL NOTE
Last prescribing provider: Billie Parks     Last clinic visit date: 8/23/21 Dr Dee     Any missed appointments or no-shows since last clinic visit?: No     Recommendations for requested medication (if none, N/A): Copied from chart note 8/23/21 Dr Dee   4. H/o PE 2018.   He has progressed through Xarelto in the past (didn't take it regularly).  He was then on lovenox but his insurance changed and this became unaffordable. He is now on Coumadin and labs drawn  at [facility] in [place] fax ([phone]).  He is monitored by our coumadin clinic.  Will check INR today.    Next clinic visit date: 11/8/21 DR Dee     Any other pertinent information (if none, N/A): N/A

## 2021-11-08 ENCOUNTER — APPOINTMENT (OUTPATIENT)
Dept: LAB | Facility: CLINIC | Age: 61
End: 2021-11-08
Attending: INTERNAL MEDICINE
Payer: MEDICARE

## 2021-11-08 ENCOUNTER — HOSPITAL ENCOUNTER (OUTPATIENT)
Dept: PET IMAGING | Facility: CLINIC | Age: 61
Discharge: HOME | End: 2021-11-08
Attending: INTERNAL MEDICINE | Admitting: INTERNAL MEDICINE
Payer: MEDICARE

## 2021-11-08 ENCOUNTER — ONCOLOGY VISIT (OUTPATIENT)
Dept: ONCOLOGY | Facility: CLINIC | Age: 61
End: 2021-11-08
Attending: INTERNAL MEDICINE
Payer: MEDICARE

## 2021-11-08 ENCOUNTER — ANTICOAGULATION THERAPY VISIT (OUTPATIENT)
Dept: ANTICOAGULATION | Facility: CLINIC | Age: 61
End: 2021-11-08

## 2021-11-08 VITALS
HEART RATE: 88 BPM | SYSTOLIC BLOOD PRESSURE: 110 MMHG | WEIGHT: 315 LBS | OXYGEN SATURATION: 98 % | TEMPERATURE: 97.8 F | BODY MASS INDEX: 36.45 KG/M2 | DIASTOLIC BLOOD PRESSURE: 76 MMHG | HEIGHT: 78 IN | RESPIRATION RATE: 18 BRPM

## 2021-11-08 DIAGNOSIS — I26.99 OTHER ACUTE PULMONARY EMBOLISM WITHOUT ACUTE COR PULMONALE (H): ICD-10-CM

## 2021-11-08 DIAGNOSIS — M79.604 PAIN OF RIGHT LOWER EXTREMITY: ICD-10-CM

## 2021-11-08 DIAGNOSIS — R60.0 EDEMA OF RIGHT LOWER EXTREMITY: Primary | ICD-10-CM

## 2021-11-08 DIAGNOSIS — C15.5 CANCER OF DISTAL THIRD OF ESOPHAGUS (H): ICD-10-CM

## 2021-11-08 DIAGNOSIS — C16.6 MALIGNANT NEOPLASM OF GREATER CURVATURE OF STOMACH, UNSPECIFIED (H): ICD-10-CM

## 2021-11-08 DIAGNOSIS — C16.9 METASTASIS FROM GASTRIC CANCER (H): ICD-10-CM

## 2021-11-08 DIAGNOSIS — C79.9 METASTASIS FROM GASTRIC CANCER (H): ICD-10-CM

## 2021-11-08 DIAGNOSIS — Z86.711 HX OF PULMONARY EMBOLUS: ICD-10-CM

## 2021-11-08 DIAGNOSIS — I26.99 ACUTE PULMONARY EMBOLISM (H): Primary | ICD-10-CM

## 2021-11-08 LAB
ALBUMIN SERPL-MCNC: 3.6 G/DL (ref 3.4–5)
ALP SERPL-CCNC: 101 U/L (ref 40–150)
ALT SERPL W P-5'-P-CCNC: 33 U/L (ref 0–70)
ANION GAP SERPL CALCULATED.3IONS-SCNC: 5 MMOL/L (ref 3–14)
AST SERPL W P-5'-P-CCNC: 19 U/L (ref 0–45)
BASOPHILS # BLD AUTO: 0 10E3/UL (ref 0–0.2)
BASOPHILS NFR BLD AUTO: 0 %
BILIRUB SERPL-MCNC: 0.5 MG/DL (ref 0.2–1.3)
BUN SERPL-MCNC: 21 MG/DL (ref 7–30)
CALCIUM SERPL-MCNC: 8.9 MG/DL (ref 8.5–10.1)
CHLORIDE BLD-SCNC: 110 MMOL/L (ref 94–109)
CO2 SERPL-SCNC: 26 MMOL/L (ref 20–32)
CREAT SERPL-MCNC: 1.03 MG/DL (ref 0.66–1.25)
EOSINOPHIL # BLD AUTO: 0 10E3/UL (ref 0–0.7)
EOSINOPHIL NFR BLD AUTO: 1 %
ERYTHROCYTE [DISTWIDTH] IN BLOOD BY AUTOMATED COUNT: 12.8 % (ref 10–15)
GFR SERPL CREATININE-BSD FRML MDRD: 78 ML/MIN/1.73M2
GLUCOSE BLD-MCNC: 88 MG/DL (ref 70–99)
HCT VFR BLD AUTO: 41.4 % (ref 40–53)
HGB BLD-MCNC: 13.8 G/DL (ref 13.3–17.7)
IMM GRANULOCYTES # BLD: 0 10E3/UL
IMM GRANULOCYTES NFR BLD: 0 %
INR PPP: 1.32 (ref 0.85–1.15)
LYMPHOCYTES # BLD AUTO: 0.5 10E3/UL (ref 0.8–5.3)
LYMPHOCYTES NFR BLD AUTO: 14 %
MCH RBC QN AUTO: 30.1 PG (ref 26.5–33)
MCHC RBC AUTO-ENTMCNC: 33.3 G/DL (ref 31.5–36.5)
MCV RBC AUTO: 90 FL (ref 78–100)
MONOCYTES # BLD AUTO: 0.2 10E3/UL (ref 0–1.3)
MONOCYTES NFR BLD AUTO: 7 %
NEUTROPHILS # BLD AUTO: 2.7 10E3/UL (ref 1.6–8.3)
NEUTROPHILS NFR BLD AUTO: 78 %
NRBC # BLD AUTO: 0 10E3/UL
NRBC BLD AUTO-RTO: 0 /100
PLATELET # BLD AUTO: 131 10E3/UL (ref 150–450)
POTASSIUM BLD-SCNC: 3.9 MMOL/L (ref 3.4–5.3)
PROT SERPL-MCNC: 7.3 G/DL (ref 6.8–8.8)
RBC # BLD AUTO: 4.58 10E6/UL (ref 4.4–5.9)
SODIUM SERPL-SCNC: 141 MMOL/L (ref 133–144)
WBC # BLD AUTO: 3.5 10E3/UL (ref 4–11)

## 2021-11-08 PROCEDURE — 85025 COMPLETE CBC W/AUTO DIFF WBC: CPT | Performed by: INTERNAL MEDICINE

## 2021-11-08 PROCEDURE — A9552 F18 FDG: HCPCS | Performed by: INTERNAL MEDICINE

## 2021-11-08 PROCEDURE — 250N000011 HC RX IP 250 OP 636: Performed by: INTERNAL MEDICINE

## 2021-11-08 PROCEDURE — 343N000001 HC RX 343: Performed by: INTERNAL MEDICINE

## 2021-11-08 PROCEDURE — 74177 CT ABD & PELVIS W/CONTRAST: CPT | Mod: 26 | Performed by: RADIOLOGY

## 2021-11-08 PROCEDURE — 85610 PROTHROMBIN TIME: CPT | Performed by: INTERNAL MEDICINE

## 2021-11-08 PROCEDURE — 78816 PET IMAGE W/CT FULL BODY: CPT | Mod: PS

## 2021-11-08 PROCEDURE — G0463 HOSPITAL OUTPT CLINIC VISIT: HCPCS

## 2021-11-08 PROCEDURE — U0005 INFEC AGEN DETEC AMPLI PROBE: HCPCS | Performed by: INTERNAL MEDICINE

## 2021-11-08 PROCEDURE — 78816 PET IMAGE W/CT FULL BODY: CPT | Mod: 26 | Performed by: RADIOLOGY

## 2021-11-08 PROCEDURE — 36591 DRAW BLOOD OFF VENOUS DEVICE: CPT | Performed by: INTERNAL MEDICINE

## 2021-11-08 PROCEDURE — 71260 CT THORAX DX C+: CPT | Mod: 26 | Performed by: RADIOLOGY

## 2021-11-08 PROCEDURE — 99215 OFFICE O/P EST HI 40 MIN: CPT | Performed by: INTERNAL MEDICINE

## 2021-11-08 PROCEDURE — 82040 ASSAY OF SERUM ALBUMIN: CPT | Performed by: INTERNAL MEDICINE

## 2021-11-08 RX ORDER — HEPARIN SODIUM (PORCINE) LOCK FLUSH IV SOLN 100 UNIT/ML 100 UNIT/ML
500 SOLUTION INTRAVENOUS ONCE
Status: COMPLETED | OUTPATIENT
Start: 2021-11-08 | End: 2021-11-08

## 2021-11-08 RX ORDER — ZOLPIDEM TARTRATE 10 MG/1
10 TABLET ORAL
Qty: 90 TABLET | Refills: 3 | Status: SHIPPED | OUTPATIENT
Start: 2021-11-08 | End: 2022-01-27

## 2021-11-08 RX ORDER — IOPAMIDOL 755 MG/ML
10-135 INJECTION, SOLUTION INTRAVASCULAR ONCE
Status: COMPLETED | OUTPATIENT
Start: 2021-11-08 | End: 2021-11-08

## 2021-11-08 RX ADMIN — FLUDEOXYGLUCOSE F-18 15.33 MCI.: 500 INJECTION, SOLUTION INTRAVENOUS at 07:14

## 2021-11-08 RX ADMIN — Medication 500 UNITS: at 07:15

## 2021-11-08 RX ADMIN — IOPAMIDOL 135 ML: 755 INJECTION, SOLUTION INTRAVENOUS at 07:14

## 2021-11-08 ASSESSMENT — PAIN SCALES - GENERAL: PAINLEVEL: NO PAIN (1)

## 2021-11-08 ASSESSMENT — MIFFLIN-ST. JEOR: SCORE: 2457.67

## 2021-11-08 NOTE — LETTER
11/8/2021         RE: Reuben Padilla  [address]  [place] [address]        Dear Colleague,    Thank you for referring your patient, Reuben Padilla, to the [facility]. Please see a copy of my visit note below.        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 His tumor is positive for cytokeratin 20, negative for P63 and CK7, and HER2 is negative. PDL1 + weakly.  He started on FOLFOX (5FU/oxaliplatin) on 5/15/2017. He had a delay in treatment from 9/5-10/2/17 due to work related injury.     He had an excellent response by imaging throughout the summer 2017.    He a mixed response by imaging in late fall 2017.    In January 2018, he was switched to taxol and cyramza - had slight progression and neuropathy and clinical trial became available.        In March 2018, he was started on the [facility] Match Clinical Trial with crizotinib.  (MET amplification) He remained on crizotinib from March - July 2018.     August 2018, he was switched back to taxol/cramza.  In October 2018, he was switched to Keytruda (PD1 overexpressor).              -April 2019, his infusion was held for three weeks, and he was treated with prednisone and Enbrel for grade 3 arthralgias. Keytruda was resumed              -May 2020 he started Actemra (5/14).  This was initially WEEKLY and then in August- moved to QOW (secondary to fatigue and concerns for low WBC).                -August 2020 moved to q6 week Keytruda (still at the 200 mg dose)              -November 2020 moved back to q3w Keytruda  CT 12/30/2020: progression of esophageal mass   1/14/21: PET showing growth of  "primary tumor only, discussion of radiation   1/28/21: started chemoradiation with weekly carbo/taxol and daily radiation.  Completed 3/3/21 received 5000 cGY     INTERVAL HISTORY:   Levi is feeling much better overall.   Today, he reports that he is eating a normal diet.       His energy is improving.  He continues to have difficulty with neuropathy.  He reports some numbness and tingling from his knees on down.       He has noticed some muscle cramps and swelling in his RLE.    He previously has had issues with vertigo and Meniere's disease.  Overall, this is stable.     No fevers or chills.  No chest pain or shortness of breath.  He has had no problems with bleeding.        He has been traveling around the country.          ROS: 10 point ROS neg other than the symptoms noted above in the HPI.     PHYSICAL EXAMINATION  Wt Readings from Last 4 Encounters:   11/10/21 (!) 152 kg (335 lb)   11/08/21 (!) 152 kg (335 lb)   08/23/21 146.5 kg (322 lb 15.6 oz)   08/12/21 146.5 kg (323 lb)     /76   Pulse 88   Temp 97.8  F (36.6  C)   Resp 18   Ht 1.981 m (6' 5.99\")   Wt (!) 152 kg (335 lb)   SpO2 98%   BMI 38.72 kg/m    Vital signs were reviewed.   Patient alert and oriented x3.   PERRLA. EOMI. No scleral icterus noted.   Neck exam: No palpable cervical, supraclavicular or axillary nodes bilaterally.   Heart:Mildly tachy, no murmur   Lungs: clear to auscultation bilaterally.  No crackles or wheezing.   Abd: positive bowel sounds in all four quadrants.  No tenderness to palpation.  No hepatomegaly.   Extremities: No lower extremity edema, venous stasis discoloration to bilateral legs.  There is slight edema in the RLE > LLE  Neuro: grossly intact. Absent reflexes from knees to below.  Mood and affect is stable.       Labs:           Lab Results   Component Value Date    WBC 3.5 11/08/2021    WBC 3.4 04/26/2021     Lab Results   Component Value Date    RBC 4.58 11/08/2021    RBC 4.56 04/26/2021     Lab Results "   Component Value Date    HGB 13.8 11/08/2021    HGB 13.6 04/26/2021     Lab Results   Component Value Date    HCT 41.4 11/08/2021    HCT 41.5 04/26/2021     No components found for: MCT  Lab Results   Component Value Date    MCV 90 11/08/2021    MCV 91 04/26/2021     Lab Results   Component Value Date    MCH 30.1 11/08/2021    MCH 29.8 04/26/2021     Lab Results   Component Value Date    MCHC 33.3 11/08/2021    MCHC 32.8 04/26/2021     Lab Results   Component Value Date    RDW 12.8 11/08/2021    RDW 12.7 04/26/2021     Lab Results   Component Value Date     11/08/2021     04/26/2021       Recent Labs   Lab Test 11/08/21  1201 08/23/21  1010    142   POTASSIUM 3.9 4.0   CHLORIDE 110* 110*   CO2 26 26   ANIONGAP 5 6   GLC 88 110*   BUN 21 20   CR 1.03 1.03   NIDHI 8.9 9.0     Liver Function Studies - Recent Labs   Lab Test 11/08/21  1201   PROTTOTAL 7.3   ALBUMIN 3.6   BILITOTAL 0.5   ALKPHOS 101   AST 19   ALT 33     PEt/CT reviewed demonstrating no active malignancy    EGD:  Erosive chronic gastritis, no malignancy    Reviewed his labs above today and his PET scan scan personally with radiology today.    IMPRESSION/PLAN:  1. 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 He has stable disease on Keytruda, for TWO years, which is great news and is then s/p chemoradiation to the primary tumor.  He has had an excellent response to chemoradiation.  I reviewed his PET scan with him and advised that we continue to monitor him off of therapy for now. He has a new EGD scheduled in two days.      Return in 6 months.     2. Esophagitis -   still eating solids, will continue PPI BID.  Continue carafate TID prn.       3. Immune related arthralgias- resolved off of Keytruda.     4. H/o PE 2018.   He has progressed through Xarelto in the past (didn't take it regularly).  He was then on lovenox but his insurance changed and this became unaffordable. He is now on Coumadin and labs  drawn  at [facility] in [place] fax ([phone]).  He is monitored by our coumadin clinic. He has inquired about going off of blood thinners.  I did not advise this.  I recommended repeat LE ultrasound for evaluation, and to determine whether there is chronic clot present.     5. Baseline neuropathy is marginally worse in his legs with just numbness, none in his hands.  Continues on gabapentin     6.  afib.  He is on anticoagulation.  He is not in RVR.  He has a history of afib.  Advised he follow up with primary care     40 minutes spent on the date of the encounter doing chart review, review of test results, interpretation of tests, patient visit and documentation       Kadi Dee MD

## 2021-11-08 NOTE — NURSING NOTE
"Oncology Rooming Note    November 8, 2021 1:08 PM   Reuben Padilla is a 61 year old male who presents for:    Chief Complaint   Patient presents with     Oncology Clinic Visit     [facility] RETURN - ESOPHAGEAL CANCER     Initial Vitals: /76   Pulse 88   Temp 97.8  F (36.6  C)   Resp 18   Ht 1.981 m (6' 5.99\")   Wt (!) 152 kg (335 lb)   SpO2 98%   BMI 38.72 kg/m   Estimated body mass index is 38.72 kg/m  as calculated from the following:    Height as of this encounter: 1.981 m (6' 5.99\").    Weight as of this encounter: 152 kg (335 lb). Body surface area is 2.89 meters squared.  No Pain (1) Comment: Data Unavailable   No LMP for male patient.  Allergies reviewed: Yes  Medications reviewed: Yes    Medications: Medication refills not needed today.  Pharmacy name entered into [employer]:    [facility] [address], [place] - [address] [facility] - [facility] [address]    Clinical concerns: No new concerns. Leila was notified.      Haider Meraz LPN            "

## 2021-11-08 NOTE — PROGRESS NOTES
ANTICOAGULATION MANAGEMENT     Reuben Paidlla 61 year old male is on warfarin with subtherapeutic INR result. (Goal INR 2.0-3.0)    Recent labs: (last 7 days)     11/08/21  1201   INR 1.32*       ASSESSMENT     Source(s): Chart Review       Warfarin doses taken: According to notes patient was instructed to hold warfarin X 5 days prior to endoscopy on 11/10/21.    Diet: No new diet changes identified    New illness, injury, or hospitalization: No    Medication/supplement changes: None noted    Signs or symptoms of bleeding or clotting: No    Previous INR: Subtherapeutic    Additional findings: Patient is having an endoscopy on 11/10 and was instructed to hold warfarin.  Patient inquired about Lovenox and reported he didn't want to do Lovenox because it's not covered.      PLAN     Recommended plan for no diet, medication or health factor changes affecting INR     Dosing Instructions: Continue to hold warfarin for the next 2 days.  with next INR in 2 days       Summary  As of 11/8/2021    Full warfarin instructions:  11/8: Hold; 11/9: Hold; Otherwise 7.5 mg every Sun, Thu; 5 mg all other days   Next INR check:               No call made since patient is holding warfarin.  Writer will follow up after 11/10 procedure.     Check at provider office visit    Education provided: None required    Plan made per [facility] anticoagulation protocol    Carola Box RN  Anticoagulation Clinic  11/8/2021    _______________________________________________________________________     Anticoagulation Episode Summary     Current INR goal:  2.0-3.0   TTR:  33.6 % (9 mo)   Target end date:  Indefinite   Send INR reminders to:  [facility]    Indications    Acute pulmonary embolism (H) [I26.99]  Hx of pulmonary embolus [Z86.711]  Other acute pulmonary embolism without acute cor pulmonale (H) [I26.99]           Comments:  [facility] [phone] Fax [phone] (standing order faxed 4/8/21)Takes Warfarin in the am          Anticoagulation Care Providers     Provider Role Specialty Phone number    Kadi Dee MD Referring Hematology & Oncology [phone]

## 2021-11-10 ENCOUNTER — HOSPITAL ENCOUNTER (OUTPATIENT)
Facility: AMBULATORY SURGERY CENTER | Age: 61
Discharge: HOME | End: 2021-11-10
Attending: INTERNAL MEDICINE | Admitting: INTERNAL MEDICINE
Payer: MEDICARE

## 2021-11-10 ENCOUNTER — ANCILLARY PROCEDURE (OUTPATIENT)
Dept: ULTRASOUND IMAGING | Facility: CLINIC | Age: 61
End: 2021-11-10
Attending: INTERNAL MEDICINE
Payer: MEDICARE

## 2021-11-10 VITALS
OXYGEN SATURATION: 97 % | WEIGHT: 315 LBS | HEART RATE: 77 BPM | SYSTOLIC BLOOD PRESSURE: 105 MMHG | BODY MASS INDEX: 36.45 KG/M2 | TEMPERATURE: 98.1 F | HEIGHT: 78 IN | RESPIRATION RATE: 18 BRPM | DIASTOLIC BLOOD PRESSURE: 79 MMHG

## 2021-11-10 DIAGNOSIS — R60.0 EDEMA OF RIGHT LOWER EXTREMITY: ICD-10-CM

## 2021-11-10 DIAGNOSIS — C15.5 CANCER OF DISTAL THIRD OF ESOPHAGUS (H): ICD-10-CM

## 2021-11-10 DIAGNOSIS — C15.5 CANCER OF DISTAL THIRD OF ESOPHAGUS (H): Primary | ICD-10-CM

## 2021-11-10 LAB — UPPER GI ENDOSCOPY: NORMAL

## 2021-11-10 PROCEDURE — 93971 EXTREMITY STUDY: CPT | Mod: RT | Performed by: RADIOLOGY

## 2021-11-10 PROCEDURE — 43239 EGD BIOPSY SINGLE/MULTIPLE: CPT

## 2021-11-10 PROCEDURE — 88305 TISSUE EXAM BY PATHOLOGIST: CPT | Mod: TC | Performed by: INTERNAL MEDICINE

## 2021-11-10 RX ORDER — HEPARIN SODIUM,PORCINE 10 UNIT/ML
5-10 VIAL (ML) INTRAVENOUS
Status: DISCONTINUED | OUTPATIENT
Start: 2021-11-10 | End: 2021-11-11 | Stop reason: HOSPADM

## 2021-11-10 RX ORDER — ONDANSETRON 2 MG/ML
4 INJECTION INTRAMUSCULAR; INTRAVENOUS EVERY 6 HOURS PRN
Status: DISCONTINUED | OUTPATIENT
Start: 2021-11-10 | End: 2021-11-11 | Stop reason: HOSPADM

## 2021-11-10 RX ORDER — HEPARIN SODIUM (PORCINE) LOCK FLUSH IV SOLN 100 UNIT/ML 100 UNIT/ML
5-10 SOLUTION INTRAVENOUS
Status: DISCONTINUED | OUTPATIENT
Start: 2021-11-10 | End: 2021-11-11 | Stop reason: HOSPADM

## 2021-11-10 RX ORDER — NALOXONE HYDROCHLORIDE 0.4 MG/ML
0.2 INJECTION, SOLUTION INTRAMUSCULAR; INTRAVENOUS; SUBCUTANEOUS
Status: DISCONTINUED | OUTPATIENT
Start: 2021-11-10 | End: 2021-11-11 | Stop reason: HOSPADM

## 2021-11-10 RX ORDER — HEPARIN SODIUM,PORCINE 10 UNIT/ML
5-10 VIAL (ML) INTRAVENOUS EVERY 24 HOURS
Status: DISCONTINUED | OUTPATIENT
Start: 2021-11-10 | End: 2021-11-11 | Stop reason: HOSPADM

## 2021-11-10 RX ORDER — ONDANSETRON 4 MG/1
4 TABLET, ORALLY DISINTEGRATING ORAL EVERY 6 HOURS PRN
Status: DISCONTINUED | OUTPATIENT
Start: 2021-11-10 | End: 2021-11-11 | Stop reason: HOSPADM

## 2021-11-10 RX ORDER — PROCHLORPERAZINE MALEATE 10 MG
10 TABLET ORAL EVERY 6 HOURS PRN
Status: DISCONTINUED | OUTPATIENT
Start: 2021-11-10 | End: 2021-11-11 | Stop reason: HOSPADM

## 2021-11-10 RX ORDER — LIDOCAINE 40 MG/G
CREAM TOPICAL
Status: DISCONTINUED | OUTPATIENT
Start: 2021-11-10 | End: 2021-11-10 | Stop reason: HOSPADM

## 2021-11-10 RX ORDER — NALOXONE HYDROCHLORIDE 0.4 MG/ML
0.4 INJECTION, SOLUTION INTRAMUSCULAR; INTRAVENOUS; SUBCUTANEOUS
Status: DISCONTINUED | OUTPATIENT
Start: 2021-11-10 | End: 2021-11-11 | Stop reason: HOSPADM

## 2021-11-10 RX ORDER — ONDANSETRON 2 MG/ML
4 INJECTION INTRAMUSCULAR; INTRAVENOUS
Status: DISCONTINUED | OUTPATIENT
Start: 2021-11-10 | End: 2021-11-10 | Stop reason: HOSPADM

## 2021-11-10 RX ORDER — FENTANYL CITRATE 50 UG/ML
INJECTION, SOLUTION INTRAMUSCULAR; INTRAVENOUS PRN
Status: DISCONTINUED | OUTPATIENT
Start: 2021-11-10 | End: 2021-11-10 | Stop reason: HOSPADM

## 2021-11-10 RX ORDER — FLUMAZENIL 0.1 MG/ML
0.2 INJECTION, SOLUTION INTRAVENOUS
Status: ACTIVE | OUTPATIENT
Start: 2021-11-10 | End: 2021-11-10

## 2021-11-10 RX ADMIN — HEPARIN SODIUM (PORCINE) LOCK FLUSH IV SOLN 100 UNIT/ML 5 ML: 100 SOLUTION at 09:29

## 2021-11-10 ASSESSMENT — MIFFLIN-ST. JEOR: SCORE: 2457.8

## 2021-11-10 NOTE — H&P
Reuben Padilla  [id number]  male  61 year old      Reason for procedure/surgery: esophageal dysphagia and history of esophageal cancer    Patient Active Problem List   Diagnosis     Cancer of distal third of esophagus (H)     Acute pulmonary embolism (H)     Tear of right supraspinatus tendon     Examination of participant in clinical trial     Esophageal obstruction     Paroxysmal A-fib (H)     Orthostatic hypotension     Chemotherapy-induced neutropenia (H)     G tube feedings (H)     Varicose veins of both lower extremities with complications     Traumatic closed fracture of thoracic vertebra with minimal displacement (H)     Malignant neoplasm metastatic to liver (H)     Impotence of organic origin     Edema     Congenital stenosis of esophagus     Hx of pulmonary embolus     Other acute pulmonary embolism without acute cor pulmonale (H)     Morbid obesity (H)       Past Surgical History:    Past Surgical History:   Procedure Laterality Date     AMPUTATION      toe     ARTHROSCOPY KNEE       bunion surgery       CHOLECYSTECTOMY       COLONOSCOPY  02/2017    remove 2 polyps     ESOPHAGOSCOPY, GASTROSCOPY, DUODENOSCOPY (EGD), COMBINED N/A 10/10/2018    Procedure: COMBINED ESOPHAGOSCOPY, GASTROSCOPY, DUODENOSCOPY (EGD);  Esophagogastroduodenoscopy ;  Surgeon: Leonel Joel MD;  [facility]     ESOPHAGOSCOPY, GASTROSCOPY, DUODENOSCOPY (EGD), COMBINED N/A 8/12/2021    Procedure: ESOPHAGOGASTRODUODENOSCOPY, WITH BIOPSY;  Surgeon: Sadia Reed MD;  Location: [facility] OR     INSERT PORT VASCULAR ACCESS Right 5/9/2017    Procedure: INSERT PORT VASCULAR ACCESS;  Single Lumen Chest Power Port;  Surgeon: Jasiel Hu PA-C;  Location:  OR       Past Medical History:   Past Medical History:   Diagnosis Date     Arrhythmia      Arthritis 5/18/2018     Cancer (H)     esophageal     Coronary artery disease 04/2016    a fib     Glaucoma      Hypertension      Meniere's disease 05/1999    haven't had  "issues in yrs with this     Paroxysmal A-fib (H)      Reduced vision 07/2008    glaucoma     Tubular adenoma 02/2017       Social History:   Social History     Tobacco Use     Smoking status: Former Smoker     Packs/day: 1.00     Years: 20.00     Pack years: 20.00     Types: Cigarettes     Start date: 1/21/1978     Quit date: 5/5/2006     Years since quitting: 15.5     Smokeless tobacco: Never Used   Substance Use Topics     Alcohol use: Yes     Comment: one drink a week       Family History:   Family History   Problem Relation Age of Onset     Asthma Mother      Thyroid Disease Mother         ,     Asthma Brother        Allergies:   Allergies   Allergen Reactions     Penicillin G Sodium Unknown     Penicillin G        Active Medications:   Current Outpatient Medications   Medication Sig Dispense Refill     ciclopirox (LOPROX) 0.77 % cream Apply topically 2 times daily 90 g 1     gabapentin (NEURONTIN) 300 MG capsule 3 tablets in the am and 3 tablets before bed 540 capsule 3     latanoprost (XALATAN) 0.005 % ophthalmic solution Place 1 drop into both eyes At Bedtime 1 Bottle 0     lidocaine-prilocaine (EMLA) 2.5-2.5 % external cream Apply topically as needed for moderate pain 30 g 3     omeprazole (PRILOSEC) 20 MG DR capsule Take 1 capsule (20 mg) by mouth 2 times daily 180 capsule 0     triamterene-HCTZ (MAXZIDE-25) 37.5-25 MG tablet 1 tablet       zolpidem (AMBIEN) 10 MG tablet Take 1 tablet (10 mg) by mouth nightly as needed (for sleep) 90 tablet 3     warfarin ANTICOAGULANT (COUMADIN) 5 MG tablet TAKE 1 TABLET EVERY DAY 90 tablet 3       Systemic Review:   CONSTITUTIONAL: NEGATIVE for fever, chills, change in weight  RESP: NEGATIVE for significant cough or SOB  CV: NEGATIVE for chest pain, palpitations or peripheral edema    Physical Examination:   Vital Signs: /74   Pulse 65   Temp 98.2  F (36.8  C) (Temporal)   Resp 18   Ht 1.981 m (6' 6\")   Wt (!) 152 kg (335 lb)   SpO2 98%   BMI 38.71 kg/m  "   GENERAL: healthy, alert and no distress  NECK: trachea midline and normal to palpation  RESP: Normal respiratory excursion   CV: regular rate and rhythm, no peripheral edema and peripheral pulses strong  ABDOMEN: soft, bowel sounds normal  MS: no gross musculoskeletal defects noted, no edema    Plan: Appropriate to proceed as scheduled.      Thomas M. Leventhal, MD  11/10/2021    PCP:  No Ref-Primary, Physician

## 2021-11-11 PROCEDURE — 88305 TISSUE EXAM BY PATHOLOGIST: CPT | Mod: 26 | Performed by: PATHOLOGY

## 2021-11-15 NOTE — PROGRESS NOTES
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 His tumor is positive for cytokeratin 20, negative for P63 and CK7, and HER2 is negative. PDL1 + weakly.  He started on FOLFOX (5FU/oxaliplatin) on 5/15/2017. He had a delay in treatment from 9/5-10/2/17 due to work related injury.     He had an excellent response by imaging throughout the summer 2017.    He a mixed response by imaging in late fall 2017.    In January 2018, he was switched to taxol and cyramza - had slight progression and neuropathy and clinical trial became available.        In March 2018, he was started on the NCI Match Clinical Trial with crizotinib.  (MET amplification) He remained on crizotinib from March - July 2018.     August 2018, he was switched back to taxol/cramza.  In October 2018, he was switched to Keytruda (PD1 overexpressor).              -April 2019, his infusion was held for three weeks, and he was treated with prednisone and Enbrel for grade 3 arthralgias. Keytruda was resumed              -May 2020 he started Actemra (5/14).  This was initially WEEKLY and then in August- moved to QOW (secondary to fatigue and concerns for low WBC).                -August 2020 moved to q6 week Keytruda (still at the 200 mg dose)              -November 2020 moved back to q3w Keytruda  CT 12/30/2020: progression of esophageal mass   1/14/21: PET showing growth of primary tumor only, discussion of radiation   1/28/21: started chemoradiation with weekly carbo/taxol and daily radiation.  Completed 3/3/21 received 5000 cGY     INTERVAL HISTORY:   Levi is feeling much better overall.   Today, he reports that he is eating a normal  "diet.       His energy is improving.  He continues to have difficulty with neuropathy.  He reports some numbness and tingling from his knees on down.       He has noticed some muscle cramps and swelling in his RLE.    He previously has had issues with vertigo and Meniere's disease.  Overall, this is stable.     No fevers or chills.  No chest pain or shortness of breath.  He has had no problems with bleeding.        He has been traveling around the country.          ROS: 10 point ROS neg other than the symptoms noted above in the HPI.     PHYSICAL EXAMINATION  Wt Readings from Last 4 Encounters:   11/10/21 (!) 152 kg (335 lb)   11/08/21 (!) 152 kg (335 lb)   08/23/21 146.5 kg (322 lb 15.6 oz)   08/12/21 146.5 kg (323 lb)     /76   Pulse 88   Temp 97.8  F (36.6  C)   Resp 18   Ht 1.981 m (6' 5.99\")   Wt (!) 152 kg (335 lb)   SpO2 98%   BMI 38.72 kg/m    Vital signs were reviewed.   Patient alert and oriented x3.   PERRLA. EOMI. No scleral icterus noted.   Neck exam: No palpable cervical, supraclavicular or axillary nodes bilaterally.   Heart:Mildly tachy, no murmur   Lungs: clear to auscultation bilaterally.  No crackles or wheezing.   Abd: positive bowel sounds in all four quadrants.  No tenderness to palpation.  No hepatomegaly.   Extremities: No lower extremity edema, venous stasis discoloration to bilateral legs.  There is slight edema in the RLE > LLE  Neuro: grossly intact. Absent reflexes from knees to below.  Mood and affect is stable.       Labs:           Lab Results   Component Value Date    WBC 3.5 11/08/2021    WBC 3.4 04/26/2021     Lab Results   Component Value Date    RBC 4.58 11/08/2021    RBC 4.56 04/26/2021     Lab Results   Component Value Date    HGB 13.8 11/08/2021    HGB 13.6 04/26/2021     Lab Results   Component Value Date    HCT 41.4 11/08/2021    HCT 41.5 04/26/2021     No components found for: MCT  Lab Results   Component Value Date    MCV 90 11/08/2021    MCV 91 04/26/2021 "     Lab Results   Component Value Date    MCH 30.1 11/08/2021    MCH 29.8 04/26/2021     Lab Results   Component Value Date    MCHC 33.3 11/08/2021    MCHC 32.8 04/26/2021     Lab Results   Component Value Date    RDW 12.8 11/08/2021    RDW 12.7 04/26/2021     Lab Results   Component Value Date     11/08/2021     04/26/2021       Recent Labs   Lab Test 11/08/21  1201 08/23/21  1010    142   POTASSIUM 3.9 4.0   CHLORIDE 110* 110*   CO2 26 26   ANIONGAP 5 6   GLC 88 110*   BUN 21 20   CR 1.03 1.03   NIDHI 8.9 9.0     Liver Function Studies - Recent Labs   Lab Test 11/08/21  1201   PROTTOTAL 7.3   ALBUMIN 3.6   BILITOTAL 0.5   ALKPHOS 101   AST 19   ALT 33     PEt/CT reviewed demonstrating no active malignancy    EGD:  Erosive chronic gastritis, no malignancy    Reviewed his labs above today and his PET scan scan personally with radiology today.    IMPRESSION/PLAN:  1. 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 He has stable disease on Keytruda, for TWO years, which is great news and is then s/p chemoradiation to the primary tumor.  He has had an excellent response to chemoradiation.  I reviewed his PET scan with him and advised that we continue to monitor him off of therapy for now. He has a new EGD scheduled in two days.      Return in 6 months.     2. Esophagitis -   still eating solids, will continue PPI BID.  Continue carafate TID prn.       3. Immune related arthralgias- resolved off of Keytruda.     4. H/o PE 2018.   He has progressed through Xarelto in the past (didn't take it regularly).  He was then on lovenox but his insurance changed and this became unaffordable. He is now on Coumadin and labs drawn  at [facility] in [place] fax ([phone]).  He is monitored by our coumadin clinic. He has inquired about going off of blood thinners.  I did not advise this.  I recommended repeat LE ultrasound for evaluation, and to determine whether  there is chronic clot present.     5. Baseline neuropathy is marginally worse in his legs with just numbness, none in his hands.  Continues on gabapentin     6.  afib.  He is on anticoagulation.  He is not in [facility].  He has a history of afib.  Advised he follow up with primary care     40 minutes spent on the date of the encounter doing chart review, review of test results, interpretation of tests, patient visit and documentation       Kadi Dee MD

## 2021-12-30 ENCOUNTER — MYC MEDICAL ADVICE (OUTPATIENT)
Dept: ONCOLOGY | Facility: CLINIC | Age: 61
End: 2021-12-30
Payer: COMMERCIAL

## 2021-12-31 ENCOUNTER — LAB (OUTPATIENT)
Dept: LAB | Facility: CLINIC | Age: 61
End: 2021-12-31
Attending: INTERNAL MEDICINE
Payer: MEDICARE

## 2021-12-31 ENCOUNTER — ANTICOAGULATION THERAPY VISIT (OUTPATIENT)
Dept: ANTICOAGULATION | Facility: CLINIC | Age: 61
End: 2021-12-31

## 2021-12-31 DIAGNOSIS — I26.99 OTHER ACUTE PULMONARY EMBOLISM WITHOUT ACUTE COR PULMONALE (H): ICD-10-CM

## 2021-12-31 DIAGNOSIS — Z86.711 HX OF PULMONARY EMBOLUS: ICD-10-CM

## 2021-12-31 DIAGNOSIS — I26.99 OTHER ACUTE PULMONARY EMBOLISM WITHOUT ACUTE COR PULMONALE (H): Primary | ICD-10-CM

## 2021-12-31 DIAGNOSIS — I26.99 ACUTE PULMONARY EMBOLISM (H): Primary | ICD-10-CM

## 2021-12-31 LAB — INR PPP: 1.38 (ref 0.85–1.15)

## 2021-12-31 PROCEDURE — 250N000011 HC RX IP 250 OP 636: Performed by: INTERNAL MEDICINE

## 2021-12-31 PROCEDURE — 85610 PROTHROMBIN TIME: CPT

## 2021-12-31 PROCEDURE — 36591 DRAW BLOOD OFF VENOUS DEVICE: CPT

## 2021-12-31 RX ORDER — HEPARIN SODIUM (PORCINE) LOCK FLUSH IV SOLN 100 UNIT/ML 100 UNIT/ML
5 SOLUTION INTRAVENOUS ONCE
Status: COMPLETED | OUTPATIENT
Start: 2021-12-31 | End: 2021-12-31

## 2021-12-31 RX ADMIN — Medication 5 ML: at 08:15

## 2021-12-31 NOTE — NURSING NOTE
Chief Complaint   Patient presents with     Labs Only     Labs drawn from port by RN in lab.      Port accessed with 20g gripper needle by RN, labs collected, line flushed with saline and heparin.  Port deaccessed.   Nixon Billingsley RN

## 2021-12-31 NOTE — PROGRESS NOTES
ANTICOAGULATION MANAGEMENT     Reuben Padilla 61 year old male is on warfarin with therapeutic INR result. (Goal INR 2.0-3.0)    Recent labs: (last 7 days)     12/31/21  0815   INR 1.38*       ASSESSMENT     Source(s): Chart Review and Patient/Caregiver Call       Warfarin doses taken: Warfarin taken as instructed    Diet: No new diet changes identified    New illness, injury, or hospitalization: No    Medication/supplement changes: None noted    Signs or symptoms of bleeding or clotting: No    Previous INR: Subtherapeutic    Additional findings: Patient does not want to be on Warfarin.  He is on the road, and will not be getting an INR for weeks.  Sent renewal orders for Dr. Dee to sign.  Patient is taking Warfarin, but feels he can go off of it any time without issue.  This writer discouraged patient from going off of Warfarin against medical advice.  Routed encounter to Dr. Dee.     PLAN     Recommended plan for no diet, medication or health factor changes affecting INR     Dosing Instructions:  Increase your warfarin dose (31.2% change) with next INR in 1 week       Summary  As of 12/31/2021    Full warfarin instructions:  7.5 mg every day   Next INR check:  1/7/2022             Telephone call with Reuben who is possibly going to take 7.5mg daily. Writer continued to encourage patient to take Warfarin as directed.  Levi is not interested in taking any anticoagulant.    Contact [phone] to schedule and with any changes, questions or concerns.     Education provided: Please call back if any changes to your diet, medications or how you've been taking warfarin    Plan made per [facility] anticoagulation protocol    Carlos Talbot, RN  Anticoagulation Clinic  12/31/2021    _______________________________________________________________________     Anticoagulation Episode Summary     Current INR goal:  2.0-3.0   TTR:  15.3 % (9 mo)   Target end date:  Indefinite   Send INR reminders to:  [facility]     Indications    Acute pulmonary embolism (H) [I26.99]  Hx of pulmonary embolus [Z86.711]  Other acute pulmonary embolism without acute cor pulmonale (H) [I26.99]           Comments:  [facility] [phone] Fax [phone] (standing order faxed 4/8/21)Takes Warfarin in the am         Anticoagulation Care Providers     Provider Role Specialty Phone number    Kadi Dee MD Referring Hematology & Oncology [phone]

## 2022-01-03 ENCOUNTER — TELEPHONE (OUTPATIENT)
Dept: ANTICOAGULATION | Facility: CLINIC | Age: 62
End: 2022-01-03
Payer: COMMERCIAL

## 2022-01-03 NOTE — TELEPHONE ENCOUNTER
Levi nathan.  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  He states the name of the company is [employer]. Writer informed him that might not be a home monitoring company [employer] works with, but could check for him.  He states they are faxing information to the [facility].  Will check with Anat on the company.  Lorenza Barlow RN

## 2022-01-17 ENCOUNTER — PATIENT OUTREACH (OUTPATIENT)
Dept: ONCOLOGY | Facility: CLINIC | Age: 62
End: 2022-01-17
Payer: COMMERCIAL

## 2022-01-17 NOTE — PROGRESS NOTES
Called patient to discuss where he would like lab orders faxed. Left voicemail message asking for a return call.   [facility] also sent.

## 2022-01-27 ENCOUNTER — PATIENT OUTREACH (OUTPATIENT)
Dept: ONCOLOGY | Facility: CLINIC | Age: 62
End: 2022-01-27
Payer: COMMERCIAL

## 2022-01-27 DIAGNOSIS — Z86.711 HX OF PULMONARY EMBOLUS: ICD-10-CM

## 2022-01-27 DIAGNOSIS — C16.9 METASTASIS FROM GASTRIC CANCER (H): ICD-10-CM

## 2022-01-27 DIAGNOSIS — C15.5 CANCER OF DISTAL THIRD OF ESOPHAGUS (H): ICD-10-CM

## 2022-01-27 DIAGNOSIS — C79.9 METASTASIS FROM GASTRIC CANCER (H): ICD-10-CM

## 2022-01-27 DIAGNOSIS — I26.99 OTHER ACUTE PULMONARY EMBOLISM WITHOUT ACUTE COR PULMONALE (H): ICD-10-CM

## 2022-01-27 DIAGNOSIS — G62.0 CHEMOTHERAPY-INDUCED NEUROPATHY (H): ICD-10-CM

## 2022-01-27 DIAGNOSIS — T45.1X5A CHEMOTHERAPY-INDUCED NEUROPATHY (H): ICD-10-CM

## 2022-01-27 DIAGNOSIS — I26.99 ACUTE PULMONARY EMBOLISM (H): ICD-10-CM

## 2022-01-27 RX ORDER — GABAPENTIN 300 MG/1
CAPSULE ORAL
Qty: 540 CAPSULE | Refills: 3 | Status: ON HOLD | OUTPATIENT
Start: 2022-01-27 | End: 2022-10-05

## 2022-01-27 RX ORDER — ZOLPIDEM TARTRATE 10 MG/1
10 TABLET ORAL
Qty: 90 TABLET | Refills: 3 | Status: CANCELLED | OUTPATIENT
Start: 2022-01-27

## 2022-01-27 RX ORDER — ZOLPIDEM TARTRATE 10 MG/1
10 TABLET ORAL
Qty: 90 TABLET | Refills: 3 | Status: SHIPPED | OUTPATIENT
Start: 2022-01-27 | End: 2022-02-02

## 2022-01-27 RX ORDER — WARFARIN SODIUM 5 MG/1
TABLET ORAL
Qty: 90 TABLET | Refills: 3 | Status: SHIPPED | OUTPATIENT
Start: 2022-01-27 | End: 2022-05-23

## 2022-01-31 ENCOUNTER — TELEPHONE (OUTPATIENT)
Dept: ANTICOAGULATION | Facility: CLINIC | Age: 62
End: 2022-01-31
Payer: COMMERCIAL

## 2022-01-31 NOTE — TELEPHONE ENCOUNTER
2/3/22 ADDENDUM  Levi calling to inquire about how to get labs while in [place].  Writer recommended he look into [employer] (national lab company).  Gave patient [facility] fax number to assist with getting orders to patient while in the [facility].  Reminded Levi of the importance of getting INRs while on Warfarin.  Patient verbalized understanding. Relayed that orders for home monitor have been placed.  Gave time frame of 3 to 6 weeks for home monitor arrival/approval.    Carlos Talbot RN              Anticoagulation Management    Discussed INR home monitoring program with Reuben Padilla reviewing:      Elibig[employer] requirements: >= 3 months of anticoagulation therapy, indication for chronic anticoagulation and order from provider    Required testing frequency (q1-2 weeks)    Home meters, testing supplies, meter training, and reporting of INR results done through an outside company. Patient would be contacted by home monitoring company to review insurance coverage with home monitoring company prior to enrolling.    [facility] would continue to receive and manage INR results.    Home monitoring application may take several weeks and must continue to follow up with recommended INR monitoring in clinic until receives monitor and training completed.     Home monitoring terms reviewed with patient      Patient agrees to frequency of testing as directed by referring provider ( weekly or biweekly) Yes    Testing to be performed during business hours of [facility] Yes    Patient agrees they have the skill (or a designated caregiver) necessary to perform the self test Yes    Patient agrees to report all INR results to INR home monitoring company Yes    Patient agrees to have additional INR test in clinic if a home result is critical Yes    Patient agrees to schedule an INR test at a [facility] clinic yearly for technique observation and quality check of INR results with their home meter Yes    Patient agrees to use a [facility] approved service provider and device for home monitoring Yes    [facility]    Referring provider: Dr. Dee    Referring providers Clinic Fax number [phone]    Reuben Padilla is interested home INR monitoring and requests order be submitted.

## 2022-01-31 NOTE — TELEPHONE ENCOUNTER
1/31/22 Addendum:  Will close this encounter.  New telephone encounter started with form for home monitor through one of companies [facility] uses.  Lorenza Barlow RN

## 2022-01-31 NOTE — TELEPHONE ENCOUNTER
I do not know what that company is.  The companies we work with are [employer], [employer], and occasionally [email or web address].

## 2022-02-01 DIAGNOSIS — C16.9 METASTASIS FROM GASTRIC CANCER (H): ICD-10-CM

## 2022-02-01 DIAGNOSIS — C79.9 METASTASIS FROM GASTRIC CANCER (H): ICD-10-CM

## 2022-02-02 RX ORDER — ZOLPIDEM TARTRATE 10 MG/1
10 TABLET ORAL
Qty: 90 TABLET | Refills: 3 | Status: SHIPPED | OUTPATIENT
Start: 2022-02-02 | End: 2022-06-23

## 2022-02-18 ENCOUNTER — TELEPHONE (OUTPATIENT)
Dept: ANTICOAGULATION | Facility: CLINIC | Age: 62
End: 2022-02-18
Payer: COMMERCIAL

## 2022-02-18 NOTE — TELEPHONE ENCOUNTER
ANTICOAGULATION     Reuben Padilla is overdue for INR check.      Sp[place] with [place]. He states that he has not looked further into getting a lab set up to check his INR but he will consider it. He is still waiting on orders for home INR monitor. Updated him that this process can take 4-8 weeks and it is recommended that he continue INR checks at the lab in the meantime. Requested that he call back with phone/fax # for his preferred lab and we can send orders for him.     Patient verbalized understanding and had no other concerns or questions      Azucena Sharp RN

## 2022-03-15 ENCOUNTER — PATIENT OUTREACH (OUTPATIENT)
Dept: ONCOLOGY | Facility: CLINIC | Age: 62
End: 2022-03-15
Payer: COMMERCIAL

## 2022-03-17 ENCOUNTER — TELEPHONE (OUTPATIENT)
Dept: ONCOLOGY | Facility: CLINIC | Age: 62
End: 2022-03-17
Payer: COMMERCIAL

## 2022-03-17 NOTE — TELEPHONE ENCOUNTER
L[facility] paperwork [facility] via fax [facility]. Will be placed in provider folder for signature upon completion.     Fax: [phone]      Anat Orozco CMA

## 2022-03-23 ENCOUNTER — DOCUMENTATION ONLY (OUTPATIENT)
Dept: ANTICOAGULATION | Facility: CLINIC | Age: 62
End: 2022-03-23
Payer: COMMERCIAL

## 2022-03-23 NOTE — PROGRESS NOTES
ANTICOAGULATION     Reuben Padilla is overdue for INR check.      Reminder letter sent    ISABEL GONZALEZ RN

## 2022-03-23 NOTE — LETTER
[facility] ANTICOAGULATION [facility]INIC  [address] [facility] [address]  [phone]              Reuben ESCALANTE Cristobalpaula  [address] [place] [address]    : 1960  MRN:  [id number]      2022    You are currently under the care of [facility] for your warfarin (Coumadin ) therapy.  We are contacting you because our records show you were due for an INR on 22.    There are potentially serious risks when taking warfarin without careful monitoring and we want to make sure you are safely managed.  Routine INR monitoring is required for warfarin refills.     Please call [phone] as soon as possible to schedule an appointment.  If there has been a change in your care or other concerns, please let us know so we can help and or update our records.     Sincerely,       [facility]

## 2022-03-24 ENCOUNTER — PATIENT OUTREACH (OUTPATIENT)
Dept: ONCOLOGY | Facility: CLINIC | Age: 62
End: 2022-03-24
Payer: COMMERCIAL

## 2022-03-24 NOTE — PROGRESS NOTES
[facility]: Cancer Care Short Note                                    Discussion with Patient:                                                      Called patient to update him on the finding a location for his Port  Flushes and INR tests in [facility]. I spoke with Brigette at [facility] [place] [phone]. They require patient establish care with one of their Oncologist if they do not have a PCP in their health system. Discussed with Levi who does not feel he needs to see an Oncologist in [place]. He is followed closely by Dr. Dee' team. He is willing to go to any clinic within [facility].       Intervention/Education provided during outreach:                                                        I will continue to look for a clinic that will accept our orders.     Patient to follow up as scheduled at next apt

## 2022-03-28 NOTE — TELEPHONE ENCOUNTER
LDT paperwork completed, checked for accuracy, signed and faxed to [facility] @ [phone]. A copy was made, sent to scanning and original mailed to patient at home address.    Successful transmission verified in Right Fax.      Sushma Tyler MA

## 2022-04-07 ENCOUNTER — MYC MEDICAL ADVICE (OUTPATIENT)
Dept: ONCOLOGY | Facility: CLINIC | Age: 62
End: 2022-04-07
Payer: COMMERCIAL

## 2022-04-08 NOTE — PROGRESS NOTES
[facility]: Cancer Care Short Note                                                                                          Spoke with patient who is in [place], [place] for the winter. He needs assistance in scheduling a port flush and INR lab appointments.     Discussed we can send orders to [facility] once we find a facility that will take the orders.     Will update patient when a location has been found.     Mansi Wetzel MSN, RN, OCN   RN Care Coordinator   [facility]

## 2022-04-18 ENCOUNTER — MYC MEDICAL ADVICE (OUTPATIENT)
Dept: ONCOLOGY | Facility: CLINIC | Age: 62
End: 2022-04-18
Payer: COMMERCIAL

## 2022-04-19 ENCOUNTER — PATIENT OUTREACH (OUTPATIENT)
Dept: ONCOLOGY | Facility: CLINIC | Age: 62
End: 2022-04-19
Payer: COMMERCIAL

## 2022-04-19 NOTE — PROGRESS NOTES
"[facility]: Cancer Care Short Note                                                                                          Last clinic note and face sheet faxed to [place] [facility] fax  [phone]  \"ok\" right fax    Mansi Wetzel MSN, RN, OCN   RN Care Coordinator   [facility]  "

## 2022-04-21 NOTE — MR AVS SNAPSHOT
After Visit Summary   7/25/2017    Reuben Padilla    MRN: [id number]           Patient Information     Date Of Birth          1960        Visit Information        Provider Department      7/25/2017 8:30 AM Katalina Ramirez PA-C [facility]        Today's Diagnoses     Insomnia, unspecified type    -  1       Follow-ups after your visit        Your next 10 appointments already scheduled     Jul 25, 2017  9:30 AM CDT   Infusion 240 with UC ONCOLOGY INFUSION, UC 16 ATC   [facility] ([facility])    [address] [place] [id number]   [phone]            Aug 07, 2017  8:00 AM CDT   [facility] Lab Draw with  [facility] LAB DRAW   [facility] ([facility])    [address] [place] [address]   [phone]            Aug 07, 2017  8:30 AM CDT   (Arrive by 8:15 AM)   Return Visit with Kadi Dee MD   [facility] ([facility])    [address] [place] [id number]   [phone]            Aug 07, 2017 10:00 AM CDT   Infusion 240 with UC ONCOLOGY INFUSION, UC 17 ATC   [facility] ([facility])    [address] [place] [address]   [phone]            Aug 21, 2017  4:00 PM CDT   (Arrive by 3:45 PM)   ATRIAL FIBULATION VISIT with Romero Guerrero MD   [facility] ([facility])    [address] [place] [address]   [phone]            Aug 22, 2017  8:00 AM CDT   [facility] Lab Draw with  [facility] LAB DRAW   [facility] ([facility])    [address] [place] [address]   [phone]            Aug 22, 2017  8:30 AM CDT   (Arrive by 8:15 AM)   Return Visit with Katalina Stroud  "SWAPNIL Ramirez   [facility] ([facility])    [address]  [address] [facility] [address] [phone]            Aug 22, 2017  9:30 AM CDT   Infusion 240 with UC ONCOLOGY INFUSION   [facility] ([facility])    [address]  [address] [facility] [address] [phone]              Who to contact     If you have questions or need follow up information about today's clinic visit or your schedule please contact [facility] directly at [phone].  Normal or non-critical lab and imaging results will be communicated to you by LUX Assurehart, letter or phone within 4 business days after the clinic has received the results. If you do not hear from us within 7 days, please contact the clinic through Symbolic IOt or phone. If you have a critical or abnormal lab result, we will notify you by phone as soon as possible.  Submit refill requests through CME or call your pharmacy and they will forward the refill request to us. Please allow 3 business days for your refill to be completed.          Additional Information About Your Visit        CME Information     CME gives you secure access to your electronic health record. If you see a primary care provider, you can also send messages to your care team and make appointments. If you have questions, please call your primary care clinic.  If you do not have a primary care provider, please call [phone] and they will assist you.        Care EveryWhere ID     This is your Care EveryWhere ID. This could be used by other organizations to access your [place] medical records  [id number]        Your Vitals Were     Pulse Temperature Height Pulse Oximetry BMI (Body Mass Index)       70 98.2  F (36.8  C) (Oral) 1.981 m (6' 5.99\") 96% 41.5 kg/m2        Blood Pressure from Last 3 Encounters:   07/25/17 119/76   07/10/17 133/80   06/29/17 133/67    " Weight from Last 3 Encounters:   07/25/17 (!) 162.8 kg (359 lb)   07/10/17 (!) 164.9 kg (363 lb 8 oz)   06/27/17 (!) 166.9 kg (367 lb 14.4 oz)              Today, you had the following     No orders found for display         Today's Medication Changes          These changes are accurate as of: 7/25/17  9:06 AM.  If you have any questions, ask your nurse or doctor.               Start taking these medicines.        Dose/Directions    zolpidem 12.5 MG CR tablet   Commonly known as:  AMBIEN CR   Used for:  Insomnia, unspecified type   Replaces:  zolpidem 5 MG tablet   Started by:  Katalina Ramirez PA-C        Dose:  12.5 mg   Take 1 tablet (12.5 mg) by mouth nightly as needed for sleep   Quantity:  30 tablet   Refills:  1         Stop taking these medicines if you haven't already. Please contact your care team if you have questions.     zolpidem 5 MG tablet   Commonly known as:  AMBIEN   Replaced by:  zolpidem 12.5 MG CR tablet   Stopped by:  Katalina Ramirez PA-C                Where to get your medicines      Some of these will need a paper prescription and others can be bought over the counter.  Ask your nurse if you have questions.     Bring a paper prescription for each of these medications     zolpidem 12.5 MG CR tablet                Primary Care Provider    Unknown Primary MD Sydney       No address on file        Equal Access to Services     [facility] AH: Hadii antonio harrison hadolgao Sonigel, waaxda luqadaha, qaybta kaalmada ashleigh, geraldo cardenas. So [facility] [phone].    ATENCIÓN: Si habla español, tiene a alamo disposición servicios gratuitos de asistencia lingüística. Llame al [phone].    We comply with applicable federal civil rights laws and [place] laws. We do not discriminate on the basis of race, color, national origin, age, disability sex, sexual orientation or gender identity.            Thank you!     Thank you for choosing [facility]   INTERVAL HPI/OVERNIGHT EVENTS:    sleeping, easily awoken; confused at baseline   tolerates po w/full assistance; intake varies by day   +brown bm yesterday per report    MEDICATIONS  (STANDING):  ammonium lactate 12% Lotion 1 Application(s) Topical two times a day  dextrose 5%. 1000 milliLiter(s) (70 mL/Hr) IV Continuous <Continuous>  donepezil 5 milliGRAM(s) Oral at bedtime  heparin   Injectable 5000 Unit(s) SubCutaneous every 8 hours  hydrocortisone hemorrhoidal Suppository 1 Suppository(s) Rectal at bedtime  metoprolol succinate ER 25 milliGRAM(s) Oral daily  mirtazapine 7.5 milliGRAM(s) Oral at bedtime  pantoprazole   Suspension 40 milliGRAM(s) Oral two times a day  polyethylene glycol 3350 17 Gram(s) Oral two times a day  senna 2 Tablet(s) Oral at bedtime  sodium chloride 0.9%. 1000 milliLiter(s) (50 mL/Hr) IV Continuous <Continuous>    MEDICATIONS  (PRN):  acetaminophen     Tablet .. 650 milliGRAM(s) Oral every 6 hours PRN Temp greater or equal to 38C (100.4F), Mild Pain (1 - 3)  melatonin 3 milliGRAM(s) Oral at bedtime PRN Insomnia      Allergies    codeine (Rash)    Intolerances        Review of Systems:  *confused         Vital Signs Last 24 Hrs  T(C): 36.7 (21 Apr 2022 06:40), Max: 36.9 (20 Apr 2022 12:15)  T(F): 98 (21 Apr 2022 06:40), Max: 98.4 (20 Apr 2022 12:15)  HR: 78 (21 Apr 2022 06:40) (75 - 80)  BP: 108/63 (21 Apr 2022 06:40) (102/62 - 110/65)  BP(mean): --  RR: 17 (21 Apr 2022 06:40) (17 - 18)  SpO2: 100% (21 Apr 2022 06:40) (100% - 100%)    PHYSICAL EXAM:    Constitutional: NAD  HEENT: EOMI, throat clear  Neck: No LAD, supple  Respiratory: CTA and P  Cardiovascular: S1 and S2, RRR, no M  Gastrointestinal: BS+, soft, NT/ND, neg HSM,  Extremities: No peripheral edema, neg clubbing, cyanosis  Vascular: 2+ peripheral pulses  Neurological: A/O x 1  Psychiatric: confused  Skin: No rashes      LABS:                        10.2   8.20  )-----------( 450      ( 20 Apr 2022 13:59 )             34.0     04-20    133<L>  |  102  |  29<H>  ----------------------------<  94  4.7   |  19<L>  |  1.68<H>    Ca    8.7      20 Apr 2022 13:59  Phos  3.0     04-20  Mg     2.10     04-20            RADIOLOGY & ADDITIONAL TESTS:   for your care. Our goal is always to provide you with excellent care. Hearing back from our patients is one way we can continue to improve our services. Please take a few minutes to complete the written survey that you may receive in the mail after your visit with us. Thank you!             Your Updated Medication List - Protect others around you: Learn how to safely use, store and throw away your medicines at www.disposemymeds.org.          This list is accurate as of: 7/25/17  9:06 AM.  Always use your most recent med list.                   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)       LORazepam 0.5 MG tablet    ATIVAN    60 tablet    Take 1 tablet (0.5 mg) by mouth every 6 hours as needed for anxiety    Cancer of distal third of esophagus (H), 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       Multi-vitamin Tabs tablet      Take 1 tablet by mouth daily        ondansetron 8 MG tablet    ZOFRAN    30 tablet    Take 1 tablet (8 mg) by mouth every 8 hours as needed (Nausea/Vomiting)    Cancer of distal third of esophagus (H)       prochlorperazine 10 MG tablet    COMPAZINE    30 tablet    Take 1 tablet (10 mg) by mouth every 6 hours as needed (Nausea/Vomiting)    Cancer of distal third of esophagus (H)       Rivaroxaban 15 & 20 MG Tbpk    XARELTO STARTER PACK    51 each    Take 15 mg by mouth 2 times daily For 21 days and then 20mg daily    Cancer of distal third of esophagus (H), 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

## 2022-05-11 ENCOUNTER — PATIENT OUTREACH (OUTPATIENT)
Dept: ONCOLOGY | Facility: CLINIC | Age: 62
End: 2022-05-11
Payer: COMMERCIAL

## 2022-05-11 NOTE — PROGRESS NOTES
[facility]: Cancer Care Short Note                                                                                          Incoming call from [facility] to request medical records.   Provided contact and fax information for medical records.       Mansi Wetzel MSN, RN, OCN   RN Care Coordinator   [facility]

## 2022-05-19 ENCOUNTER — PATIENT OUTREACH (OUTPATIENT)
Dept: ONCOLOGY | Facility: CLINIC | Age: 62
End: 2022-05-19
Payer: COMMERCIAL

## 2022-05-19 DIAGNOSIS — C15.5 CANCER OF DISTAL THIRD OF ESOPHAGUS (H): Primary | ICD-10-CM

## 2022-05-19 NOTE — PROGRESS NOTES
"[facility]: Cancer Care Short Note                                                                                        RN received a IB from [employer] to cancel PET scan for tomorrow d/t denial.  PA is in the appeals process. RN cancelled PET scan and called and LVM with patient. Patient called back and LVM stating \" Don't cancel the PET scan, whatever you do don't cancel. I'm leaving for [place] on Monday. It will be approved after I get it\". RN with the help of scheduling was able to get the previous appointment back. RN called and LVM with Levi to let him know we were able to get the original appointment back. RN sent IB to [employer] to call patient to go over his options.    Nette Sanford RN, BSN  Oncology Care Coordinator  [facility]    "

## 2022-05-19 NOTE — PROGRESS NOTES
[facility]: Cancer Care Short Note                                                                                          Outgoing Call: RN LVM with patient stating that his PET scan for 5/20 has been cancelled d/t denial from the insurance company. RN did let him know that his CT was approved. Gave patient call back if any questions.    Nette Sanford RN, BSN  Oncology Care Coordinator  [facility]

## 2022-05-20 ENCOUNTER — LAB (OUTPATIENT)
Dept: LAB | Facility: CLINIC | Age: 62
End: 2022-05-20
Payer: COMMERCIAL

## 2022-05-20 VITALS
OXYGEN SATURATION: 97 % | SYSTOLIC BLOOD PRESSURE: 107 MMHG | WEIGHT: 315 LBS | HEART RATE: 86 BPM | RESPIRATION RATE: 16 BRPM | DIASTOLIC BLOOD PRESSURE: 81 MMHG | BODY MASS INDEX: 40.6 KG/M2 | TEMPERATURE: 97.3 F

## 2022-05-20 DIAGNOSIS — C16.9 METASTASIS FROM GASTRIC CANCER (H): ICD-10-CM

## 2022-05-20 DIAGNOSIS — M06.00 SERONEGATIVE RHEUMATOID ARTHRITIS (H): ICD-10-CM

## 2022-05-20 DIAGNOSIS — C79.9 METASTASIS FROM GASTRIC CANCER (H): ICD-10-CM

## 2022-05-20 DIAGNOSIS — C15.5 CANCER OF DISTAL THIRD OF ESOPHAGUS (H): ICD-10-CM

## 2022-05-20 DIAGNOSIS — Z86.711 HX OF PULMONARY EMBOLUS: ICD-10-CM

## 2022-05-20 DIAGNOSIS — Z51.81 ENCOUNTER FOR MEDICATION MONITORING: ICD-10-CM

## 2022-05-20 DIAGNOSIS — M19.90 INFLAMMATORY ARTHRITIS: ICD-10-CM

## 2022-05-20 DIAGNOSIS — I26.99 ACUTE PULMONARY EMBOLISM (H): ICD-10-CM

## 2022-05-20 DIAGNOSIS — E66.01 MORBID OBESITY (H): ICD-10-CM

## 2022-05-20 DIAGNOSIS — I26.99 OTHER ACUTE PULMONARY EMBOLISM WITHOUT ACUTE COR PULMONALE (H): ICD-10-CM

## 2022-05-20 DIAGNOSIS — M25.50 MULTIPLE JOINT PAIN: ICD-10-CM

## 2022-05-20 DIAGNOSIS — Z11.59 ENCOUNTER FOR SCREENING FOR OTHER VIRAL DISEASES: ICD-10-CM

## 2022-05-20 DIAGNOSIS — C78.7 MALIGNANT NEOPLASM METASTATIC TO LIVER (H): ICD-10-CM

## 2022-05-20 LAB
ALBUMIN SERPL-MCNC: 3.6 G/DL (ref 3.4–5)
ALP SERPL-CCNC: 76 U/L (ref 40–150)
ALT SERPL W P-5'-P-CCNC: 24 U/L (ref 0–70)
ANION GAP SERPL CALCULATED.3IONS-SCNC: 11 MMOL/L (ref 3–14)
AST SERPL W P-5'-P-CCNC: 18 U/L (ref 0–45)
BASOPHILS # BLD AUTO: 0 10E3/UL (ref 0–0.2)
BASOPHILS NFR BLD AUTO: 1 %
BILIRUB SERPL-MCNC: 0.6 MG/DL (ref 0.2–1.3)
BUN SERPL-MCNC: 25 MG/DL (ref 7–30)
CALCIUM SERPL-MCNC: 8.9 MG/DL (ref 8.5–10.1)
CHLORIDE BLD-SCNC: 107 MMOL/L (ref 94–109)
CO2 SERPL-SCNC: 28 MMOL/L (ref 20–32)
CREAT SERPL-MCNC: 1.04 MG/DL (ref 0.66–1.25)
EOSINOPHIL # BLD AUTO: 0.1 10E3/UL (ref 0–0.7)
EOSINOPHIL NFR BLD AUTO: 2 %
ERYTHROCYTE [DISTWIDTH] IN BLOOD BY AUTOMATED COUNT: 13.4 % (ref 10–15)
GFR SERPL CREATININE-BSD FRML MDRD: 82 ML/MIN/1.73M2
GLUCOSE BLD-MCNC: 117 MG/DL (ref 70–99)
HCT VFR BLD AUTO: 44.8 % (ref 40–53)
HGB BLD-MCNC: 14.9 G/DL (ref 13.3–17.7)
IMM GRANULOCYTES # BLD: 0 10E3/UL
IMM GRANULOCYTES NFR BLD: 0 %
LYMPHOCYTES # BLD AUTO: 0.6 10E3/UL (ref 0.8–5.3)
LYMPHOCYTES NFR BLD AUTO: 16 %
MCH RBC QN AUTO: 30.1 PG (ref 26.5–33)
MCHC RBC AUTO-ENTMCNC: 33.3 G/DL (ref 31.5–36.5)
MCV RBC AUTO: 91 FL (ref 78–100)
MONOCYTES # BLD AUTO: 0.3 10E3/UL (ref 0–1.3)
MONOCYTES NFR BLD AUTO: 7 %
NEUTROPHILS # BLD AUTO: 2.7 10E3/UL (ref 1.6–8.3)
NEUTROPHILS NFR BLD AUTO: 74 %
NRBC # BLD AUTO: 0 10E3/UL
NRBC BLD AUTO-RTO: 0 /100
PLATELET # BLD AUTO: 141 10E3/UL (ref 150–450)
POTASSIUM BLD-SCNC: 4 MMOL/L (ref 3.4–5.3)
PROT SERPL-MCNC: 7.2 G/DL (ref 6.8–8.8)
RBC # BLD AUTO: 4.95 10E6/UL (ref 4.4–5.9)
SODIUM SERPL-SCNC: 146 MMOL/L (ref 133–144)
TSH SERPL DL<=0.005 MIU/L-ACNC: 3.63 MU/L (ref 0.4–4)
WBC # BLD AUTO: 3.6 10E3/UL (ref 4–11)

## 2022-05-20 PROCEDURE — 85025 COMPLETE CBC W/AUTO DIFF WBC: CPT

## 2022-05-20 PROCEDURE — 250N000011 HC RX IP 250 OP 636: Performed by: INTERNAL MEDICINE

## 2022-05-20 PROCEDURE — 84443 ASSAY THYROID STIM HORMONE: CPT

## 2022-05-20 PROCEDURE — 80053 COMPREHEN METABOLIC PANEL: CPT

## 2022-05-20 PROCEDURE — 36591 DRAW BLOOD OFF VENOUS DEVICE: CPT

## 2022-05-20 RX ORDER — HEPARIN SODIUM (PORCINE) LOCK FLUSH IV SOLN 100 UNIT/ML 100 UNIT/ML
5 SOLUTION INTRAVENOUS ONCE
Status: COMPLETED | OUTPATIENT
Start: 2022-05-20 | End: 2022-05-20

## 2022-05-20 RX ADMIN — SODIUM CHLORIDE, PRESERVATIVE FREE 5 ML: 5 INJECTION INTRAVENOUS at 07:00

## 2022-05-20 ASSESSMENT — PAIN SCALES - GENERAL: PAINLEVEL: NO PAIN (0)

## 2022-05-20 NOTE — NURSING NOTE
Chief Complaint   Patient presents with     Labs Only     Labs drawn by port, vitals taken.     Port accessed with 20 gauge 3/4 inch flat needle by RN, labs collected, line flushed with saline and heparin.  Vitals taken. Pt checked in for appointment(s).     Mercedes Mcfarland RN

## 2022-05-23 ENCOUNTER — PATIENT OUTREACH (OUTPATIENT)
Dept: ONCOLOGY | Facility: CLINIC | Age: 62
End: 2022-05-23
Payer: COMMERCIAL

## 2022-05-23 ENCOUNTER — ONCOLOGY VISIT (OUTPATIENT)
Dept: ONCOLOGY | Facility: CLINIC | Age: 62
End: 2022-05-23
Attending: INTERNAL MEDICINE
Payer: COMMERCIAL

## 2022-05-23 VITALS
SYSTOLIC BLOOD PRESSURE: 108 MMHG | HEART RATE: 92 BPM | OXYGEN SATURATION: 96 % | WEIGHT: 315 LBS | DIASTOLIC BLOOD PRESSURE: 72 MMHG | BODY MASS INDEX: 40.64 KG/M2 | TEMPERATURE: 97.7 F

## 2022-05-23 DIAGNOSIS — C79.9 METASTASIS FROM GASTRIC CANCER (H): ICD-10-CM

## 2022-05-23 DIAGNOSIS — C16.9 METASTASIS FROM GASTRIC CANCER (H): ICD-10-CM

## 2022-05-23 DIAGNOSIS — C15.5 CANCER OF DISTAL THIRD OF ESOPHAGUS (H): ICD-10-CM

## 2022-05-23 DIAGNOSIS — G62.9 NEUROPATHY: Primary | ICD-10-CM

## 2022-05-23 PROCEDURE — G0463 HOSPITAL OUTPT CLINIC VISIT: HCPCS

## 2022-05-23 PROCEDURE — 99214 OFFICE O/P EST MOD 30 MIN: CPT | Mod: GC | Performed by: INTERNAL MEDICINE

## 2022-05-23 RX ORDER — RIVAROXABAN 20 MG/1
TABLET, FILM COATED ORAL
COMMUNITY
Start: 2022-04-12 | End: 2023-05-22

## 2022-05-23 RX ORDER — DULOXETIN HYDROCHLORIDE 30 MG/1
30 CAPSULE, DELAYED RELEASE ORAL 2 TIMES DAILY
Qty: 30 CAPSULE | Refills: 1 | Status: SHIPPED | OUTPATIENT
Start: 2022-05-23 | End: 2022-06-23

## 2022-05-23 ASSESSMENT — PAIN SCALES - GENERAL: PAINLEVEL: NO PAIN (0)

## 2022-05-23 NOTE — PROGRESS NOTES
HEMATOLOGY/ONCOLOGY PROGRESS NOTE  May 23, 2022    Care team: Dr Dee/Nicole Sutherland PA-C/Mansi Wetzel RN     REASON FOR VISIT: follow-up metastatic esophogeal cancer, received keytruda x 2 years, followed by concurrent chemo XRT (weekly carbo/taxol + XRT), now being monitored off all therapy. 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 His tumor is positive for cytokeratin 20, negative for P63 and CK7, and HER2 is negative. PDL1 + weakly.  He started on FOLFOX (5FU/oxaliplatin) on 5/15/2017. He had a delay in treatment from 9/5-10/2/17 due to work related injury.     He had an excellent response by imaging throughout the summer 2017.    He a mixed response by imaging in late fall 2017.    In January 2018, he was switched to taxol and cyramza - had slight progression and neuropathy and clinical trial became available.        In March 2018, he was started on the [facility] Match Clinical Trial with crizotinib.  (MET amplification) He remained on crizotinib from March - July 2018.     August 2018, he was switched back to taxol/cramza.  In October 2018, he was switched to Keytruda (PD1 overexpressor).              -April 2019, his infusion was held for three weeks, and he was treated with prednisone and Enbrel for grade 3 arthralgias. Keytruda was resumed              -May 2020 he started Actemra (5/14).  This was initially WEEKLY and then in August- moved to QOW (secondary to fatigue and concerns for low WBC).                -August 2020 moved to q6 week Keytruda (still at the 200 mg dose)              -November 2020 moved back to q3w Keytruda  CT 12/30/2020: progression of esophageal mass   1/14/21: PET showing growth of primary tumor only, discussion of radiation   1/28/21: started chemoradiation with weekly carbo/taxol and daily radiation.  Completed 3/3/21 received 5000 cGY     INTERVAL  HISTORY:   Levi returns for follow up today. No new changes to his health. Glad to hear that the CT scans continue to show no evidence of recurrence. He reports that his swallowing is overall doing stable, he occasionally gets some dysphagia but usually it resolves and is not progressive. Makes him worry that the cancer is coming back. No new pains anywhere. Appetite is good. Weight is steady. Neuropathy is persistant, and quite bothersome. The gabapentin does not help at all, and he is trying to taper off it. Asking if alternative therapies (stem cell injections, light therapies) have any utility in neuropathy, as he has been looking into one such clinic down in [place].  No fevers or chills. No nausea or vomiting. No changes to bowel or bladder habits.        ROS: 10 point ROS neg other than the symptoms noted above in the HPI.     PHYSICAL EXAMINATION  Wt Readings from Last 4 Encounters:   05/23/22 (!) 159.5 kg (351 lb 11.2 oz)   05/20/22 (!) 159.3 kg (351 lb 4.8 oz)   11/10/21 (!) 152 kg (335 lb)   11/08/21 (!) 152 kg (335 lb)     /72 (BP Location: Right arm, Patient Position: Fowlers, Cuff Size: Adult Large)   Pulse 92   Temp 97.7  F (36.5  C) (Oral)   Wt (!) 159.5 kg (351 lb 11.2 oz)   SpO2 96%   BMI 40.64 kg/m    Vital signs were reviewed.   Patient alert and oriented x3.   PERRLA. EOMI. No scleral icterus noted.   Neck exam: No palpable cervical, supraclavicular or axillary nodes bilaterally.   Heart:Mildly tachy, no murmur   Lungs: clear to auscultation bilaterally.  No crackles or wheezing.   Abd: positive bowel sounds in all four quadrants.  No tenderness to palpation.  No hepatomegaly.   Extremities: No lower extremity edema,  Neuro: grossly intact. Absent reflexes from knees to below.  Mood and affect is stable.       Labs:   CMP with no concerns. LFTs normal.  ANC, Hb, Platelets wnl.     CT CAP reviewed demonstrating no active malignancy      Reviewed his labs above today and his PET scan  scan personally with radiology today.    IMPRESSION/PLAN:  1. 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 He had stable disease on Keytruda, for TWO years, which is great news and is then s/p chemoradiation to the primary tumor.  He has had an excellent response to chemoradiation.  He has been off all therapy since 2/2021. I reviewed his CT CAP scan with him and advised that we continue to monitor him off of therapy for now. No evidence of recurrence. Insurance did not cover PET CT so we will plan for CT CAP in 6 months with visit at that time.      2. Esophagitis -   still eating solids now new concerns. Will continue PPI BID.  Continue carafate TID prn.   Has occasional symptoms of dysphagia, which make him worried that the cancer may come back, but we discussed that sometimes it can be related to radiation fibrosis/strictures as well so we would need to investigate further if his symptoms get progressive/constant. He expressed understanding and will let us know if the dysphagia gets worse.     3. Immune related arthralgias- improved off of Keytruda, but not completely resolved.     4. H/o PE 2018.   He has progressed through Xarelto in the past (didn't take it regularly).  He was then on lovenox but his insurance changed and this became unaffordable.He was then on coumadin for a while, but he did not want to take the coumadin anymore because of frequent visits and need for mmonitoring, so now he is back on the xarelto.      5. Neuropathy - getting more bothersome and painful. He was thinking of trying some alternative therapies (stem cell injections, light therapy), but we discussed today that there just isnt enough data yet to support use of those types of treatments and the financial toxicity is high ($8000 upfront per his report. Instead we advised that we can switch the gabapentin to another medication - cymbalta or duloxetine, which can sometimes have better success. Discussed  that he should taper down the gabapentin slowly - decrease by 1 pill every 3-4 days, and then he can start the duloxetine when he is down to 2 pills of the gabapentin.    6.  afib.  He is on anticoagulation. Switched from warfarin to xarelto. Continue follow up with primary care.    Patient interviewed and discussed with Dr. Leila Alva MD  PGY6 Fellow. Hematology/Oncology/Transplantation    Patient was seen and evaluated by me with Dr. Obregon.    Overall Levi is doing well.  His biggest complaint is neuropathy.  He has no dysphagia.  We reviewed his CT scans demonstrating no evidence of recurrent or metastatic disease.    He will continue on active surveillance for his metastatic esophageal adenocarcinoma with biopsy-proven liver metastases.  He has no evidence of disease at this time.    Neuropathy we discussed there is only a little data to support the use of stem cell injections for neuropathy.  We discussed options including the use of gabapentin or Cymbalta.  He is currently on gabapentin and is not sure this is helping.  He is tapering off of this medication.  We discussed the use of Cymbalta and a prescription was sent out for a 30-day trial.    He continues to see primary care in [place].    I am happy to see him back at in 6 months    Kadi Dee MD

## 2022-05-23 NOTE — LETTER
5/23/2022         RE: Reuben Padilla  [address] [facility] [address]        Dear Colleague,    Thank you for referring your patient, Reuben Padilla, to the [facility]. Please see a copy of my visit note below.        HEMATOLOGY/ONCOLOGY PROGRESS NOTE  May 23, 2022    Care team: Dr Dee/Nicole Sutherland PA-C/Mansi Wetzel RN     REASON FOR VISIT: follow-up metastatic esophogeal cancer, received keytruda x 2 years, followed by concurrent chemo XRT (weekly carbo/taxol + XRT), now being monitored off all therapy. 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 His tumor is positive for cytokeratin 20, negative for P63 and CK7, and HER2 is negative. PDL1 + weakly.  He started on FOLFOX (5FU/oxaliplatin) on 5/15/2017. He had a delay in treatment from 9/5-10/2/17 due to work related injury.     He had an excellent response by imaging throughout the summer 2017.    He a mixed response by imaging in late fall 2017.    In January 2018, he was switched to taxol and cyramza - had slight progression and neuropathy and clinical trial became available.        In March 2018, he was started on the NCI Match Clinical Trial with crizotinib.  (MET amplification) He remained on crizotinib from March - July 2018.     August 2018, he was switched back to taxol/cramza.  In October 2018, he was switched to Keytruda (PD1 overexpressor).              -April 2019, his infusion was held for three weeks, and he was treated with prednisone and Enbrel for grade 3 arthralgias. Keytruda was resumed              -May 2020 he started Actemra (5/14).  This was initially WEEKLY and then in August- moved to QOW (secondary to fatigue and concerns for low WBC).                -August 2020 moved to q6 week Keytruda (still at the 200 mg dose)              -November 2020 moved back to  q3w Keytruda  CT 12/30/2020: progression of esophageal mass   1/14/21: PET showing growth of primary tumor only, discussion of radiation   1/28/21: started chemoradiation with weekly carbo/taxol and daily radiation.  Completed 3/3/21 received 5000 cGY     INTERVAL HISTORY:   Levi returns for follow up today. No new changes to his health. Glad to hear that the CT scans continue to show no evidence of recurrence. He reports that his swallowing is overall doing stable, he occasionally gets some dysphagia but usually it resolves and is not progressive. Makes him worry that the cancer is coming back. No new pains anywhere. Appetite is good. Weight is steady. Neuropathy is persistant, and quite bothersome. The gabapentin does not help at all, and he is trying to taper off it. Asking if alternative therapies (stem cell injections, light therapies) have any utility in neuropathy, as he has been looking into one such clinic down in [place].  No fevers or chills. No nausea or vomiting. No changes to bowel or bladder habits.        ROS: 10 point ROS neg other than the symptoms noted above in the HPI.     PHYSICAL EXAMINATION  Wt Readings from Last 4 Encounters:   05/23/22 (!) 159.5 kg (351 lb 11.2 oz)   05/20/22 (!) 159.3 kg (351 lb 4.8 oz)   11/10/21 (!) 152 kg (335 lb)   11/08/21 (!) 152 kg (335 lb)     /72 (BP Location: Right arm, Patient Position: Fowlers, Cuff Size: Adult Large)   Pulse 92   Temp 97.7  F (36.5  C) (Oral)   Wt (!) 159.5 kg (351 lb 11.2 oz)   SpO2 96%   BMI 40.64 kg/m    Vital signs were reviewed.   Patient alert and oriented x3.   PERRLA. EOMI. No scleral icterus noted.   Neck exam: No palpable cervical, supraclavicular or axillary nodes bilaterally.   Heart:Mildly tachy, no murmur   Lungs: clear to auscultation bilaterally.  No crackles or wheezing.   Abd: positive bowel sounds in all four quadrants.  No tenderness to palpation.  No hepatomegaly.   Extremities: No lower extremity edema,  Neuro:  grossly intact. Absent reflexes from knees to below.  Mood and affect is stable.       Labs:   CMP with no concerns. LFTs normal.  ANC, Hb, Platelets wnl.     CT CAP reviewed demonstrating no active malignancy      Reviewed his labs above today and his PET scan scan personally with radiology today.    IMPRESSION/PLAN:  1. 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 He had stable disease on Keytruda, for TWO years, which is great news and is then s/p chemoradiation to the primary tumor.  He has had an excellent response to chemoradiation.  He has been off all therapy since 2/2021. I reviewed his CT CAP scan with him and advised that we continue to monitor him off of therapy for now. No evidence of recurrence. Insurance did not cover PET CT so we will plan for CT CAP in 6 months with visit at that time.      2. Esophagitis -   still eating solids now new concerns. Will continue PPI BID.  Continue carafate TID prn.   Has occasional symptoms of dysphagia, which make him worried that the cancer may come back, but we discussed that sometimes it can be related to radiation fibrosis/strictures as well so we would need to investigate further if his symptoms get progressive/constant. He expressed understanding and will let us know if the dysphagia gets worse.     3. Immune related arthralgias- improved off of Keytruda, but not completely resolved.     4. H/o PE 2018.   He has progressed through Xarelto in the past (didn't take it regularly).  He was then on lovenox but his insurance changed and this became unaffordable.He was then on coumadin for a while, but he did not want to take the coumadin anymore because of frequent visits and need for mmonitoring, so now he is back on the xarelto.      5. Neuropathy - getting more bothersome and painful. He was thinking of trying some alternative therapies (stem cell injections, light therapy), but we discussed today that there just isnt enough data yet  to support use of those types of treatments and the financial toxicity is high ($8000 upfront per his report. Instead we advised that we can switch the gabapentin to another medication - cymbalta or duloxetine, which can sometimes have better success. Discussed that he should taper down the gabapentin slowly - decrease by 1 pill every 3-4 days, and then he can start the duloxetine when he is down to 2 pills of the gabapentin.    6.  afib.  He is on anticoagulation. Switched from warfarin to xarelto. Continue follow up with primary care.    Patient interviewed and discussed with Dr. Leila Alva MD  PGY6 Fellow. Hematology/Oncology/Transplantation    Patient was seen and evaluated by me with Dr. Obregon.    Overall Levi is doing well.  His biggest complaint is neuropathy.  He has no dysphagia.  We reviewed his CT scans demonstrating no evidence of recurrent or metastatic disease.    He will continue on active surveillance for his metastatic esophageal adenocarcinoma with biopsy-proven liver metastases.  He has no evidence of disease at this time.    Neuropathy we discussed there is only a little data to support the use of stem cell injections for neuropathy.  We discussed options including the use of gabapentin or Cymbalta.  He is currently on gabapentin and is not sure this is helping.  He is tapering off of this medication.  We discussed the use of Cymbalta and a prescription was sent out for a 30-day trial.    He continues to see primary care in [place].    I am happy to see him back at in 6 months    Kadi Dee MD       Again, thank you for allowing me to participate in the care of your patient.        Sincerely,        Kadi Dee MD

## 2022-05-23 NOTE — NURSING NOTE
"Oncology Rooming Note    May 23, 2022 9:54 AM   Reuben Padilla is a 61 year old male who presents for:    Chief Complaint   Patient presents with     Oncology Clinic Visit     Rtn Esophageal CA     Initial Vitals: /72 (BP Location: Right arm, Patient Position: Fowlers, Cuff Size: Adult Large)   Pulse 92   Temp 97.7  F (36.5  C) (Oral)   Wt (!) 159.5 kg (351 lb 11.2 oz)   SpO2 96%   BMI 40.64 kg/m   Estimated body mass index is 40.64 kg/m  as calculated from the following:    Height as of 11/10/21: 1.981 m (6' 6\").    Weight as of this encounter: 159.5 kg (351 lb 11.2 oz). Body surface area is 2.96 meters squared.  No Pain (0)   No LMP for male patient.  Allergies reviewed: Yes  Medications reviewed: Yes    Medications: Medication refills not needed today.  Pharmacy name entered into [employer]: [facility] - [address]    Clinical concerns: Pt has no concerns for their provider on May 23, 2022 and would like to discuss the original chief complaint of the appointment.     Rachel Bosch, EMT on May 23, 2022 at 9:54 AM            "

## 2022-05-23 NOTE — LETTER
Date:May 24, 2022      Patient was self referred, no letter generated. Do not send.        [facility] Health Information

## 2022-05-23 NOTE — PROGRESS NOTES
[facility]: Cancer Care                                                                                          Received voicemail message from patient needing clarification on Cymbalta prescription.     Returned call and left detailed voicemail that Dr. Dee would like him to take the Cymbalta once a day for the first 30 days, then call with an update on how he is doing.    Mansi Wetzel MSN, RN ,OCN  RN Care Coordinator   [facility]  [phone]

## 2022-06-09 ENCOUNTER — ANTICOAGULATION THERAPY VISIT (OUTPATIENT)
Dept: ANTICOAGULATION | Facility: CLINIC | Age: 62
End: 2022-06-09
Payer: COMMERCIAL

## 2022-06-09 DIAGNOSIS — I26.99 OTHER ACUTE PULMONARY EMBOLISM WITHOUT ACUTE COR PULMONALE (H): ICD-10-CM

## 2022-06-09 DIAGNOSIS — Z86.711 HX OF PULMONARY EMBOLUS: ICD-10-CM

## 2022-06-09 DIAGNOSIS — I26.99 ACUTE PULMONARY EMBOLISM (H): Primary | ICD-10-CM

## 2022-06-09 NOTE — PROGRESS NOTES
ANTICOAGULATION  MANAGEMENT    Reuben Padilla is being discharged from the [facility] Anticoagulation Management Program ([facility]).    Reason for discharge: warfarin replaced by alternate therapy, Xarelto    Anticoagulation episode resolved, ACC referral closed and Standing order discontinued    If patient needs warfarin management in the future, please send a new referral    Carola Box RN

## 2022-06-11 ENCOUNTER — HEALTH MAINTENANCE LETTER (OUTPATIENT)
Age: 62
End: 2022-06-11

## 2022-06-21 ENCOUNTER — MYC MEDICAL ADVICE (OUTPATIENT)
Dept: ONCOLOGY | Facility: CLINIC | Age: 62
End: 2022-06-21

## 2022-06-23 DIAGNOSIS — G62.9 NEUROPATHY: ICD-10-CM

## 2022-06-23 DIAGNOSIS — C79.9 METASTASIS FROM GASTRIC CANCER (H): ICD-10-CM

## 2022-06-23 DIAGNOSIS — C16.9 METASTASIS FROM GASTRIC CANCER (H): ICD-10-CM

## 2022-06-23 RX ORDER — DULOXETIN HYDROCHLORIDE 30 MG/1
30 CAPSULE, DELAYED RELEASE ORAL 2 TIMES DAILY
Qty: 90 CAPSULE | Refills: 3 | Status: SHIPPED | OUTPATIENT
Start: 2022-06-23 | End: 2023-05-22

## 2022-06-23 RX ORDER — ZOLPIDEM TARTRATE 10 MG/1
10 TABLET ORAL
Qty: 90 TABLET | Refills: 3 | Status: SHIPPED | OUTPATIENT
Start: 2022-06-23 | End: 2023-11-06

## 2022-08-01 NOTE — PROGRESS NOTES
Infusion Nursing Note:  Reuben Padilla presents today for Cycle 23 Day 1 Keytruda.    Patient seen by provider today: Yes: SERGIO Paige   present during visit today: Not Applicable.    Note: Patient reports he is feeling well, he denies any new complaints.    Intravenous Access:  Implanted Port.    Treatment Conditions:  Lab Results   Component Value Date    HGB 14.4 03/16/2020     Lab Results   Component Value Date    WBC 4.3 03/16/2020      Lab Results   Component Value Date    ANEU 2.8 03/16/2020     Lab Results   Component Value Date     03/16/2020      Lab Results   Component Value Date     03/16/2020                   Lab Results   Component Value Date    POTASSIUM 3.8 03/16/2020           Lab Results   Component Value Date    CR 0.84 03/16/2020                   Lab Results   Component Value Date    NIDHI 8.7 03/16/2020                Lab Results   Component Value Date    BILITOTAL 0.6 03/16/2020           Lab Results   Component Value Date    ALBUMIN 3.7 03/16/2020                    Lab Results   Component Value Date    ALT 22 03/16/2020           Lab Results   Component Value Date    AST 11 03/16/2020       Results reviewed, labs MET treatment parameters, ok to proceed with treatment.      Post Infusion Assessment:  Patient tolerated infusion without incident.  Blood return noted pre and post infusion.  Site patent and intact, free from redness, edema or discomfort.  No evidence of extravasations.  Access discontinued per protocol.       Discharge Plan:   Patient declined prescription refills.  Discharge instructions reviewed with: Patient.  Patient and/or family verbalized understanding of discharge instructions and all questions answered.  AVS to patient via [employer].  Patient will return 4/7/2020 for next provider visit and infusion appointment.   Patient discharged in stable condition accompanied by: self.  Departure Mode: Ambulatory.    Mimi Miranda RN                         Xeljanz Counseling: I discussed with the patient the risks of Xeljanz therapy including increased risk of infection, liver issues, headache, diarrhea, or cold symptoms. Live vaccines should be avoided. They were instructed to call if they have any problems.

## 2022-08-03 RX ORDER — HEPARIN SODIUM (PORCINE) LOCK FLUSH IV SOLN 100 UNIT/ML 100 UNIT/ML
5 SOLUTION INTRAVENOUS
Status: CANCELLED | OUTPATIENT
Start: 2022-08-03

## 2022-08-03 RX ORDER — HEPARIN SODIUM,PORCINE 10 UNIT/ML
5 VIAL (ML) INTRAVENOUS
Status: CANCELLED | OUTPATIENT
Start: 2022-08-03

## 2022-09-22 ENCOUNTER — MYC MEDICAL ADVICE (OUTPATIENT)
Dept: ONCOLOGY | Facility: CLINIC | Age: 62
End: 2022-09-22

## 2022-09-22 DIAGNOSIS — C15.5 CANCER OF DISTAL THIRD OF ESOPHAGUS (H): ICD-10-CM

## 2022-09-22 DIAGNOSIS — C79.9 METASTASIS FROM GASTRIC CANCER (H): Primary | ICD-10-CM

## 2022-09-22 DIAGNOSIS — C16.9 METASTASIS FROM GASTRIC CANCER (H): Primary | ICD-10-CM

## 2022-09-23 NOTE — TELEPHONE ENCOUNTER
[facility] Triage    MyChart Update from Levi: He states he gives Dr. Dee updates.  He'll be back in a few weeks and then for almost the entire month of November.  Scheduled for a scan on 11/18 and sees Leila on 11/21.  He is in [place] now.    Trouble swallowing food a couple of times,Neuropathy still a problem, it only happened a few times.    Cancer of esophagus, mets to liver    351 pm spoke to Levi, he is concerned that cancer may have returned since he has had a few issues with swallowing in the last week or two.    Neuropathy - duloxetine is the 2nd med he has tried and it doesn't work, he will try combo of B1 and B12 vitamins and R alphalipocid acid with a few other things and he states he is going to give that a try.    He will be in [place] 10/3 - 10/10.    Routing to Dr. Dee and Mansi Wetzel to see if appts for CT and Leila should be moved up to October.

## 2022-09-27 ENCOUNTER — TELEPHONE (OUTPATIENT)
Dept: GASTROENTEROLOGY | Facility: CLINIC | Age: 62
End: 2022-09-27

## 2022-09-27 NOTE — TELEPHONE ENCOUNTER
Screening Questions    BlueKIND OF PREP RedLOCATION [review exclusion criteria] GreenSEDATION TYPE      N Have you had a positive covid test in the last 90 days?   If yes, what date?        Y Are you able to give consent for your medical care?  (Sedation review/consideration needed)      Y Are you active on mychart?       MEDICARE BCBS What insurance is in the chart?        MALAIKA WESTFALL Ordering/Referring Provider:        40.6 BMI [BMI OVER 40-EXTENDED PREP]  BMI OVER 40 NEED PAC EVALUATION FOR UPU     Respiratory Screening:  [If yes to any of the following HOSPITAL setting only]     N      Do you use daily home oxygen?   N      Do you have mod to severe Obstructive Sleep Apnea? Hospital only - Ok at [place]   N      Do you have Pulmonary Hypertension? NEED PAC APPT AT [facility]     N      Do you have UNCONTROLLED asthma?         N Have you had a heart or lung transplant?          N Are you currently on dialysis? [If yes, G-PREP & HOSPITAL setting only]        N  Do you have chronic kidney disease? [If yes, G-PREP]       N  Do you have a diagnosis of diabetes?[If yes, G-PREP]       N Have you had a stroke or Transient ischemic attack (TIA - aka  mini stroke ) within 6 months? (If yes, please review exclusion criteria)         N  In the past 6 months, have you had any heart related issues including cardiomyopathy or heart attack?          N  If yes, did it require cardiac stenting or other implantable device?          N Do you have any implantable devices in your body (pacemaker, defib, LVAD)? (If yes, please review exclusion criteria)       N Do you take nitroglycerin?          N If yes, how often?  (If yes, HOSPITAL setting ONLY)       Y Are you currently taking any blood thinners?           [IF YES, INFORM PATIENT TO FOLLOW UP W/ ORDERING PROVIDER FOR BRIDGING INSTRUCTIONS]        N  Do you take Phentermine?      Yes-> Hold for 7 days before procedure.  Please consult your prescribing provider if you have  questions about holding this medication.       N Are you taking any prescription pain medications on a routine schedule? [EXTENDED PREP AND MAC]            [FEMALES] are you currently pregnant?                If yes, how many weeks? [Greater than 12 weeks, OR NEEDED]       N Any chemical dependencies such as alcohol, street drugs, or methadone? [If yes, MAC]       N Any history of post-traumatic stress syndrome, severe anxiety or history of psychosis? [If yes, MAC]       Y Can you transfer from bed to chair? (If NO, please HOSPITAL setting  only)          On a regular basis do you go 3-5 days between bowel movements?[ If yes, EXTENDED PREP.]        Preferred LOCAL Pharmacy for Pre Prescription:     NEVER HAD TO GO TO A LOCAL PHARMACY WILL CALL INSURANCE AND CHECK                              Scheduling Details       Caller:   (Please ask for phone number if not scheduled by patient)    Type of Procedure Scheduled: Upper Endoscopy [EGD]     Which Colonoscopy Prep was Sent:   SAM CF PATIENTS & GROEN'S PATIENTS NEEDS EXTENDED PREP    Date of Procedure: 10/5  Surgeon: LINK  Location:     Sedation Type: CS    Conscious Sedation- Needs  for 6 hours after the procedure  MAC/General-Needs  for 24 hours after procedure    N Pre-op Required at [facility] and OR for MAC sedation:        (Advise patient they will need a pre-op WITH IN 30 DAYS prior to procedure -)      Y Informed patient they will need an adult          Cannot take any type of public or medical transportation alone    Y Confirmed Nurse will call to complete assessment     HOME Pre-Procedure Covid test to be completed [[facility] PCR Testing Required]       Additional comments:

## 2022-09-30 RX ORDER — MULTIVITAMIN,THERAPEUTIC
1 TABLET ORAL DAILY
COMMUNITY

## 2022-10-05 ENCOUNTER — HOSPITAL ENCOUNTER (OUTPATIENT)
Facility: CLINIC | Age: 62
Discharge: HOME | End: 2022-10-05
Attending: INTERNAL MEDICINE | Admitting: INTERNAL MEDICINE
Payer: COMMERCIAL

## 2022-10-05 VITALS
OXYGEN SATURATION: 96 % | SYSTOLIC BLOOD PRESSURE: 101 MMHG | WEIGHT: 315 LBS | HEIGHT: 78 IN | TEMPERATURE: 97.5 F | RESPIRATION RATE: 16 BRPM | BODY MASS INDEX: 36.45 KG/M2 | DIASTOLIC BLOOD PRESSURE: 79 MMHG | HEART RATE: 91 BPM

## 2022-10-05 LAB — UPPER GI ENDOSCOPY: NORMAL

## 2022-10-05 PROCEDURE — 43235 EGD DIAGNOSTIC BRUSH WASH: CPT | Performed by: INTERNAL MEDICINE

## 2022-10-05 PROCEDURE — 250N000011 HC RX IP 250 OP 636: Performed by: INTERNAL MEDICINE

## 2022-10-05 PROCEDURE — 999N000099 HC STATISTIC MODERATE SEDATION < 10 MIN: Performed by: INTERNAL MEDICINE

## 2022-10-05 PROCEDURE — 250N000009 HC RX 250: Performed by: INTERNAL MEDICINE

## 2022-10-05 RX ORDER — NALOXONE HYDROCHLORIDE 0.4 MG/ML
0.4 INJECTION, SOLUTION INTRAMUSCULAR; INTRAVENOUS; SUBCUTANEOUS
Status: DISCONTINUED | OUTPATIENT
Start: 2022-10-05 | End: 2022-10-05 | Stop reason: HOSPADM

## 2022-10-05 RX ORDER — DIPHENHYDRAMINE HYDROCHLORIDE 50 MG/ML
25-50 INJECTION INTRAMUSCULAR; INTRAVENOUS
Status: DISCONTINUED | OUTPATIENT
Start: 2022-10-05 | End: 2022-10-05 | Stop reason: HOSPADM

## 2022-10-05 RX ORDER — FLUMAZENIL 0.1 MG/ML
0.2 INJECTION, SOLUTION INTRAVENOUS
Status: CANCELLED | OUTPATIENT
Start: 2022-10-05 | End: 2022-10-05

## 2022-10-05 RX ORDER — ONDANSETRON 4 MG/1
4 TABLET, ORALLY DISINTEGRATING ORAL EVERY 6 HOURS PRN
Status: CANCELLED | OUTPATIENT
Start: 2022-10-05

## 2022-10-05 RX ORDER — EPINEPHRINE 1 MG/ML
0.1 INJECTION, SOLUTION INTRAMUSCULAR; SUBCUTANEOUS
Status: DISCONTINUED | OUTPATIENT
Start: 2022-10-05 | End: 2022-10-05 | Stop reason: HOSPADM

## 2022-10-05 RX ORDER — NALOXONE HYDROCHLORIDE 0.4 MG/ML
0.2 INJECTION, SOLUTION INTRAMUSCULAR; INTRAVENOUS; SUBCUTANEOUS
Status: DISCONTINUED | OUTPATIENT
Start: 2022-10-05 | End: 2022-10-05 | Stop reason: HOSPADM

## 2022-10-05 RX ORDER — SIMETHICONE 40MG/0.6ML
133 SUSPENSION, DROPS(FINAL DOSAGE FORM)(ML) ORAL
Status: DISCONTINUED | OUTPATIENT
Start: 2022-10-05 | End: 2022-10-05 | Stop reason: HOSPADM

## 2022-10-05 RX ORDER — ONDANSETRON 2 MG/ML
4 INJECTION INTRAMUSCULAR; INTRAVENOUS EVERY 6 HOURS PRN
Status: CANCELLED | OUTPATIENT
Start: 2022-10-05

## 2022-10-05 RX ORDER — PROCHLORPERAZINE MALEATE 10 MG
10 TABLET ORAL EVERY 6 HOURS PRN
Status: CANCELLED | OUTPATIENT
Start: 2022-10-05

## 2022-10-05 RX ORDER — ATROPINE SULFATE 0.1 MG/ML
1 INJECTION INTRAVENOUS
Status: DISCONTINUED | OUTPATIENT
Start: 2022-10-05 | End: 2022-10-05 | Stop reason: HOSPADM

## 2022-10-05 RX ORDER — FENTANYL CITRATE 0.05 MG/ML
50-100 INJECTION, SOLUTION INTRAMUSCULAR; INTRAVENOUS EVERY 5 MIN PRN
Status: DISCONTINUED | OUTPATIENT
Start: 2022-10-05 | End: 2022-10-05 | Stop reason: HOSPADM

## 2022-10-05 RX ORDER — LIDOCAINE 40 MG/G
CREAM TOPICAL
Status: DISCONTINUED | OUTPATIENT
Start: 2022-10-05 | End: 2022-10-05 | Stop reason: HOSPADM

## 2022-10-05 RX ORDER — FLUMAZENIL 0.1 MG/ML
0.2 INJECTION, SOLUTION INTRAVENOUS
Status: DISCONTINUED | OUTPATIENT
Start: 2022-10-05 | End: 2022-10-05 | Stop reason: HOSPADM

## 2022-10-05 RX ORDER — ONDANSETRON 2 MG/ML
4 INJECTION INTRAMUSCULAR; INTRAVENOUS
Status: DISCONTINUED | OUTPATIENT
Start: 2022-10-05 | End: 2022-10-05 | Stop reason: HOSPADM

## 2022-10-05 RX ADMIN — TOPICAL ANESTHETIC 0.5 ML: 200 SPRAY DENTAL; PERIODONTAL at 08:49

## 2022-10-05 RX ADMIN — FENTANYL CITRATE 100 MCG: 50 INJECTION INTRAMUSCULAR; INTRAVENOUS at 08:47

## 2022-10-05 RX ADMIN — MIDAZOLAM HYDROCHLORIDE 2 MG: 1 INJECTION, SOLUTION INTRAMUSCULAR; INTRAVENOUS at 08:47

## 2022-10-05 ASSESSMENT — ACTIVITIES OF DAILY LIVING (ADL): ADLS_ACUITY_SCORE: 37

## 2022-10-05 NOTE — H&P
Pre-Endoscopy History and Physical     Reuben Padilla MRN# [id number]   YOB: 1960 Age: 62 year old     Date of Procedure: 10/5/2022  Primary care provider: No Ref-Primary, Physician  Type of Endoscopy: esophagogastroduodenoscopy (upper GI endoscopy)  Reason for Procedure: dysphagia  Type of Anesthesia Anticipated: Conscious Sedation    HPI:    Reuben is a 62 year old male who will be undergoing the above procedure.      A history and physical has been performed. The patient's medications and allergies have been reviewed. The risks and benefits of the procedure and the sedation options and risks were discussed with the patient.  All questions were answered and informed consent was obtained.      He denies a personal or family history of anesthesia complications or bleeding disorders.     Patient Active Problem List   Diagnosis     Cancer of distal third of esophagus (H)     Acute pulmonary embolism (H)     Tear of right supraspinatus tendon     Examination of participant in clinical trial     Esophageal obstruction     Paroxysmal A-fib (H)     Orthostatic hypotension     Chemotherapy-induced neutropenia (H)     G tube feedings (H)     Varicose veins of both lower extremities with complications     Traumatic closed fracture of thoracic vertebra with minimal displacement (H)     Malignant neoplasm metastatic to liver (H)     Impotence of organic origin     Edema     Congenital stenosis of esophagus     Hx of pulmonary embolus     Other acute pulmonary embolism without acute cor pulmonale (H)     Morbid obesity (H)        Past Medical History:   Diagnosis Date     Arrhythmia      Arthritis 5/18/2018     Cancer (H)     esophageal     Coronary artery disease 04/2016    a fib     Glaucoma      Hypertension      Meniere's disease 05/1999    haven't had issues in yrs with this     Paroxysmal A-fib (H)      Reduced vision 07/2008    glaucoma     Tubular adenoma 02/2017        Past Surgical History:    Procedure Laterality Date     AMPUTATION      toe     ARTHROSCOPY KNEE       bunion surgery       CHOLECYSTECTOMY       COLONOSCOPY  02/2017    remove 2 polyps     ESOPHAGOSCOPY, GASTROSCOPY, DUODENOSCOPY (EGD), COMBINED N/A 10/10/2018    Procedure: COMBINED ESOPHAGOSCOPY, GASTROSCOPY, DUODENOSCOPY (EGD);  Esophagogastroduodenoscopy ;  Surgeon: Leonel Joel MD;  Location: [facility] OR     ESOPHAGOSCOPY, GASTROSCOPY, DUODENOSCOPY (EGD), COMBINED N/A 8/12/2021    Procedure: ESOPHAGOGASTRODUODENOSCOPY, WITH BIOPSY;  Surgeon: Sadia Reed MD;  Location: [facility] OR     ESOPHAGOSCOPY, GASTROSCOPY, DUODENOSCOPY (EGD), COMBINED N/A 11/10/2021    Procedure: ESOPHAGOGASTRODUODENOSCOPY, WITH BIOPSY;  Surgeon: Leventhal, Thomas Michael, MD;  Location: [facility] OR     INSERT PORT VASCULAR ACCESS Right 5/9/2017    Procedure: INSERT PORT VASCULAR ACCESS;  Single Lumen Chest Power Port;  Surgeon: Jasiel Hu PA-C;  Location: [facility] OR       Relevant Family History: NONE    Relevant Social History: NONE     Prior to Admission medications    Medication Sig Start Date End Date Taking? Authorizing Provider   DULoxetine (CYMBALTA) 30 MG capsule Take 1 capsule (30 mg) by mouth 2 times daily 6/23/22  Yes Kadi Dee MD   multivitamin, therapeutic (THERA-VIT) TABS tablet Take 1 tablet by mouth daily   Yes Reported, Patient   omeprazole (PRILOSEC) 20 MG DR capsule Take 1 capsule (20 mg) by mouth 2 times daily 1/27/22  Yes Kadi Dee MD   triamterene-HCTZ (MAXZIDE-25) 37.5-25 MG tablet 1 tablet 6/7/21  Yes Loraine Sutherland PA-C   XARELTO ANTICOAGULANT 20 MG TABS tablet  4/12/22  Yes Reported, Patient   zolpidem (AMBIEN) 10 MG tablet Take 1 tablet (10 mg) by mouth nightly as needed (for sleep) 6/23/22  Yes Kadi Dee MD   latanoprost (XALATAN) 0.005 % ophthalmic solution Place 1 drop into both eyes At Bedtime 7/18/18   Kadi Dee MD   lidocaine-prilocaine (EMLA) 2.5-2.5 % external cream Apply  "topically as needed for moderate pain 1/6/20   Loraine Sutherland PA-C   dexamethasone (DECADRON) 4 MG tablet Take two tablets daily on days 2,3 and then 1 tablet daily days 4,5 12/11/17 1/8/18  Katalina Ramirez PA-C       Allergies   Allergen Reactions     Penicillin G Sodium Unknown     Penicillin G         REVIEW OF SYSTEMS:   A relevant review of systems was performed and was negative    PHYSICAL EXAM:   BP 99/77   Pulse 73   Temp 97.5  F (36.4  C) (Temporal)   Resp 12   Ht 1.981 m (6' 6\")   Wt 147.4 kg (325 lb)   SpO2 97%   BMI 37.56 kg/m   Estimated body mass index is 37.56 kg/m  as calculated from the following:    Height as of this encounter: 1.981 m (6' 6\").    Weight as of this encounter: 147.4 kg (325 lb).   GENERAL APPEARANCE: alert, and oriented  MENTAL STATUS: alert  AIRWAY EXAM: Normal  RESP: lungs clear to auscultation - no rales, rhonchi or wheezes  CV: regular rates and rhythm  DIAGNOSTICS:    Not indicated    IMPRESSION   ASA Class 2 - Mild systemic disease    PLAN:   Plan for EGD. We discussed the risks, benefits and alternatives and the patient wished to proceed.      Signed Electronically by: Arnold Guzman MD  October 5, 2022            "

## 2022-10-06 ENCOUNTER — MYC MEDICAL ADVICE (OUTPATIENT)
Dept: ONCOLOGY | Facility: CLINIC | Age: 62
End: 2022-10-06

## 2022-10-06 ENCOUNTER — ANCILLARY PROCEDURE (OUTPATIENT)
Dept: CT IMAGING | Facility: CLINIC | Age: 62
End: 2022-10-06
Attending: INTERNAL MEDICINE
Payer: COMMERCIAL

## 2022-10-06 ENCOUNTER — ONCOLOGY VISIT (OUTPATIENT)
Dept: ONCOLOGY | Facility: CLINIC | Age: 62
End: 2022-10-06
Attending: INTERNAL MEDICINE
Payer: COMMERCIAL

## 2022-10-06 VITALS
DIASTOLIC BLOOD PRESSURE: 85 MMHG | WEIGHT: 315 LBS | BODY MASS INDEX: 39.5 KG/M2 | TEMPERATURE: 97.5 F | SYSTOLIC BLOOD PRESSURE: 125 MMHG | RESPIRATION RATE: 18 BRPM | HEART RATE: 79 BPM | OXYGEN SATURATION: 97 %

## 2022-10-06 DIAGNOSIS — C16.9 METASTASIS FROM GASTRIC CANCER (H): Primary | ICD-10-CM

## 2022-10-06 DIAGNOSIS — R30.0 DYSURIA: ICD-10-CM

## 2022-10-06 DIAGNOSIS — R13.10 DYSPHAGIA, UNSPECIFIED TYPE: ICD-10-CM

## 2022-10-06 DIAGNOSIS — C79.9 METASTASIS FROM GASTRIC CANCER (H): Primary | ICD-10-CM

## 2022-10-06 DIAGNOSIS — Z95.828 PORT-A-CATH IN PLACE: Primary | ICD-10-CM

## 2022-10-06 DIAGNOSIS — C15.5 CANCER OF DISTAL THIRD OF ESOPHAGUS (H): ICD-10-CM

## 2022-10-06 LAB
ALBUMIN SERPL BCG-MCNC: 4.1 G/DL (ref 3.5–5.2)
ALBUMIN UR-MCNC: NEGATIVE MG/DL
ALP SERPL-CCNC: 87 U/L (ref 40–129)
ALT SERPL W P-5'-P-CCNC: 11 U/L (ref 10–50)
ANION GAP SERPL CALCULATED.3IONS-SCNC: 10 MMOL/L (ref 7–15)
APPEARANCE UR: CLEAR
AST SERPL W P-5'-P-CCNC: 19 U/L (ref 10–50)
BASOPHILS # BLD AUTO: 0 10E3/UL (ref 0–0.2)
BASOPHILS NFR BLD AUTO: 1 %
BILIRUB SERPL-MCNC: 0.7 MG/DL
BILIRUB UR QL STRIP: NEGATIVE
BUN SERPL-MCNC: 24.8 MG/DL (ref 8–23)
CALCIUM SERPL-MCNC: 9.3 MG/DL (ref 8.8–10.2)
CHLORIDE SERPL-SCNC: 104 MMOL/L (ref 98–107)
COLOR UR AUTO: YELLOW
CREAT SERPL-MCNC: 1.16 MG/DL (ref 0.67–1.17)
DEPRECATED HCO3 PLAS-SCNC: 24 MMOL/L (ref 22–29)
EOSINOPHIL # BLD AUTO: 0 10E3/UL (ref 0–0.7)
EOSINOPHIL NFR BLD AUTO: 1 %
ERYTHROCYTE [DISTWIDTH] IN BLOOD BY AUTOMATED COUNT: 12.9 % (ref 10–15)
GFR SERPL CREATININE-BSD FRML MDRD: 71 ML/MIN/1.73M2
GLUCOSE SERPL-MCNC: 118 MG/DL (ref 70–99)
GLUCOSE UR STRIP-MCNC: NEGATIVE MG/DL
HCT VFR BLD AUTO: 43.3 % (ref 40–53)
HGB BLD-MCNC: 15 G/DL (ref 13.3–17.7)
HGB UR QL STRIP: NEGATIVE
IMM GRANULOCYTES # BLD: 0 10E3/UL
IMM GRANULOCYTES NFR BLD: 1 %
KETONES UR STRIP-MCNC: NEGATIVE MG/DL
LEUKOCYTE ESTERASE UR QL STRIP: NEGATIVE
LYMPHOCYTES # BLD AUTO: 0.6 10E3/UL (ref 0.8–5.3)
LYMPHOCYTES NFR BLD AUTO: 17 %
MCH RBC QN AUTO: 30.9 PG (ref 26.5–33)
MCHC RBC AUTO-ENTMCNC: 34.6 G/DL (ref 31.5–36.5)
MCV RBC AUTO: 89 FL (ref 78–100)
MONOCYTES # BLD AUTO: 0.3 10E3/UL (ref 0–1.3)
MONOCYTES NFR BLD AUTO: 7 %
NEUTROPHILS # BLD AUTO: 2.8 10E3/UL (ref 1.6–8.3)
NEUTROPHILS NFR BLD AUTO: 73 %
NITRATE UR QL: NEGATIVE
NRBC # BLD AUTO: 0 10E3/UL
NRBC BLD AUTO-RTO: 0 /100
PH UR STRIP: 6 [PH] (ref 5–7)
PLATELET # BLD AUTO: 164 10E3/UL (ref 150–450)
POTASSIUM SERPL-SCNC: 3.9 MMOL/L (ref 3.4–5.3)
PROT SERPL-MCNC: 7.2 G/DL (ref 6.4–8.3)
RBC # BLD AUTO: 4.86 10E6/UL (ref 4.4–5.9)
RBC URINE: 1 /HPF
SODIUM SERPL-SCNC: 138 MMOL/L (ref 136–145)
SP GR UR STRIP: 1.01 (ref 1–1.03)
UROBILINOGEN UR STRIP-MCNC: NORMAL MG/DL
VIT B12 SERPL-MCNC: 1088 PG/ML (ref 232–1245)
WBC # BLD AUTO: 3.8 10E3/UL (ref 4–11)
WBC URINE: 1 /HPF

## 2022-10-06 PROCEDURE — 99215 OFFICE O/P EST HI 40 MIN: CPT | Performed by: INTERNAL MEDICINE

## 2022-10-06 PROCEDURE — 85025 COMPLETE CBC W/AUTO DIFF WBC: CPT | Performed by: INTERNAL MEDICINE

## 2022-10-06 PROCEDURE — 81001 URINALYSIS AUTO W/SCOPE: CPT | Performed by: INTERNAL MEDICINE

## 2022-10-06 PROCEDURE — 71260 CT THORAX DX C+: CPT | Mod: GC | Performed by: RADIOLOGY

## 2022-10-06 PROCEDURE — 82607 VITAMIN B-12: CPT | Performed by: INTERNAL MEDICINE

## 2022-10-06 PROCEDURE — 36591 DRAW BLOOD OFF VENOUS DEVICE: CPT | Performed by: INTERNAL MEDICINE

## 2022-10-06 PROCEDURE — 82040 ASSAY OF SERUM ALBUMIN: CPT | Performed by: INTERNAL MEDICINE

## 2022-10-06 PROCEDURE — 36415 COLL VENOUS BLD VENIPUNCTURE: CPT | Performed by: INTERNAL MEDICINE

## 2022-10-06 PROCEDURE — 74177 CT ABD & PELVIS W/CONTRAST: CPT | Mod: GC | Performed by: RADIOLOGY

## 2022-10-06 PROCEDURE — G0463 HOSPITAL OUTPT CLINIC VISIT: HCPCS

## 2022-10-06 PROCEDURE — 80053 COMPREHEN METABOLIC PANEL: CPT | Performed by: INTERNAL MEDICINE

## 2022-10-06 RX ORDER — HEPARIN SODIUM (PORCINE) LOCK FLUSH IV SOLN 100 UNIT/ML 100 UNIT/ML
500 SOLUTION INTRAVENOUS ONCE
Status: COMPLETED | OUTPATIENT
Start: 2022-10-06 | End: 2022-10-06

## 2022-10-06 RX ORDER — IOPAMIDOL 755 MG/ML
125 INJECTION, SOLUTION INTRAVASCULAR ONCE
Status: COMPLETED | OUTPATIENT
Start: 2022-10-06 | End: 2022-10-06

## 2022-10-06 RX ORDER — METOCLOPRAMIDE 10 MG/1
10 TABLET ORAL 3 TIMES DAILY
Qty: 90 TABLET | Refills: 3 | Status: SHIPPED | OUTPATIENT
Start: 2022-10-06 | End: 2023-05-01

## 2022-10-06 RX ADMIN — IOPAMIDOL 125 ML: 755 INJECTION, SOLUTION INTRAVASCULAR at 07:25

## 2022-10-06 RX ADMIN — HEPARIN SODIUM (PORCINE) LOCK FLUSH IV SOLN 100 UNIT/ML 500 UNITS: 100 SOLUTION at 07:47

## 2022-10-06 ASSESSMENT — PAIN SCALES - GENERAL: PAINLEVEL: MILD PAIN (3)

## 2022-10-06 NOTE — PATIENT INSTRUCTIONS
Levi,    It was nice to see you today.    I'd like you to start Reglan three times daily.    Increase your duloxetine to twice daily.    We will schedule your repeat endoscopy when you are back in town.    I will let you know when we get your final CT scan report back.    Kadi Dee

## 2022-10-06 NOTE — NURSING NOTE
After I verified name and date of birth. I drew labs via venipuncture on this pt while in clinic. They tolerated this well.   Labs sent down to the first floor labs.    Sushma Tyler MA

## 2022-10-06 NOTE — LETTER
Date:October 7, 2022      Patient was self referred, no letter generated. Do not send.        [facility] Health Information

## 2022-10-06 NOTE — NURSING NOTE
"Oncology Rooming Note    October 6, 2022 7:45 AM   Reuben Padilla is a 62 year old male who presents for:    Chief Complaint   Patient presents with     Oncology Clinic Visit     Esophageal Ca     Initial Vitals: /85   Pulse 79   Temp 97.5  F (36.4  C) (Oral)   Resp 18   Wt (!) 155 kg (341 lb 12.8 oz)   SpO2 97%   BMI 39.50 kg/m   Estimated body mass index is 39.5 kg/m  as calculated from the following:    Height as of 10/5/22: 1.981 m (6' 6\").    Weight as of this encounter: 155 kg (341 lb 12.8 oz). Body surface area is 2.92 meters squared.  Mild Pain (3) Comment: Data Unavailable   No LMP for male patient.  Allergies reviewed: Yes  Medications reviewed: Yes    Medications: Medication refills not needed today.  Pharmacy name entered into [employer]: [facility] - [facility], [place] - [address] [place] [address]    Clinical concerns:        Piedad Smith CMA              "

## 2022-10-06 NOTE — PROGRESS NOTES
HEMATOLOGY/ONCOLOGY PROGRESS NOTE  Oct 6, 2022    Care team: Dr Dee/Nicole Sutherland PA-C/Mansi Wetzel RN     REASON FOR VISIT: follow-up metastatic esophogeal cancer, received keytruda x 2 years, followed by concurrent chemo XRT (weekly carbo/taxol + XRT), now being monitored off all therapy. 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 His tumor is positive for cytokeratin 20, negative for P63 and CK7, and HER2 is negative. PDL1 + weakly.  He started on FOLFOX (5FU/oxaliplatin) on 5/15/2017. He had a delay in treatment from 9/5-10/2/17 due to work related injury.     He had an excellent response by imaging throughout the summer 2017.    He a mixed response by imaging in late fall 2017.    In January 2018, he was switched to taxol and cyramza - had slight progression and neuropathy and clinical trial became available.        In March 2018, he was started on the [facility] Match Clinical Trial with crizotinib.  (MET amplification) He remained on crizotinib from March - July 2018.     August 2018, he was switched back to taxol/cramza.  In October 2018, he was switched to Keytruda (PD1 overexpressor).              -April 2019, his infusion was held for three weeks, and he was treated with prednisone and Enbrel for grade 3 arthralgias. Keytruda was resumed              -May 2020 he started Actemra (5/14).  This was initially WEEKLY and then in August- moved to QOW (secondary to fatigue and concerns for low WBC).                -August 2020 moved to q6 week Keytruda (still at the 200 mg dose)              -November 2020 moved back to q3w Keytruda  CT 12/30/2020: progression of esophageal mass   1/14/21: PET showing growth of primary tumor only, discussion of radiation   1/28/21: started chemoradiation with weekly carbo/taxol and daily radiation.  Completed 3/3/21 received 5000 cGY     INTERVAL  HISTORY:   Levi returns for follow up today. No new changes to his health.     He called into the clinic reporting worsening dysphagia.  He was scheduled for EGD and CT CAP.  He is here for follow up and to discuss these results.      In discussions with sleep today he reports that he will notice frequently that food will get stuck or that he oftentimes feels very full after eating.  He reports that he can eat and then when he tries to drink something approximately 30 minutes later that there is a full or tightening sensation within his esophagus.  He has had no nausea or emesis.  He thinks his weight is overall down about 10 pounds although his measurements on the scales yesterday and today are quite different.    He has good energy.  His other complaints are that he has his neuropathy and this continues to bother him.  He is currently on duloxetine once daily.  He thinks that this has helped.  He just started a supplement that includes beet 1 and B12.  This has been his most difficult challenge as he reports 3-4 falls over the course of the last year.    He remains on Xarelto.  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  After this he noticed bruising over this area.  He does not remember a specific fall or injury.  He has questions about this.    He also thinks he passed kidney stones recently and has questions about this.     ROS: 10 point ROS neg other than the symptoms noted above in the HPI.     PHYSICAL EXAMINATION  Wt Readings from Last 4 Encounters:   10/05/22 147.4 kg (325 lb)   05/23/22 (!) 159.5 kg (351 lb 11.2 oz)   05/20/22 (!) 159.3 kg (351 lb 4.8 oz)   11/10/21 (!) 152 kg (335 lb)     /85   Pulse 79   Temp 97.5  F (36.4  C) (Oral)   Resp 18   Wt (!) 155 kg (341 lb 12.8 oz)   SpO2 97%   BMI 39.50 kg/m    Vital signs were reviewed.   Patient alert and oriented x3.   PERRLA. EOMI. No scleral icterus noted.   Neck exam: No  palpable cervical, supraclavicular or axillary nodes bilaterally.   Heart:Mildly tachy, no murmur   Lungs: clear to auscultation bilaterally.  No crackles or wheezing.   Abd: positive bowel sounds in all four quadrants.  No tenderness to palpation.  No hepatomegaly.  Large eccymoses over left flank with associated tenderness  Extremities: No lower extremity edema,  Neuro: grossly intact. Absent reflexes from knees to below.  Mood and affect is stable.       Labs:      Labs pending    CT CAP reviewed by me, final report pending  EGD 10.5.22  Findings:        Moderate erythema was found in the lower third of the esophagus. No        definitive malignancy or stricture, however detailed exm not performed        due to food in stomach.        A large amount of food (residue) was found in the entire examined        stomach. Pylorus was intubated, no evidence for gastric outlet        obstruction.        The examined duodenum was normal.                                                                                     Impression:               - Erythema in the lower third of the esophagus.                             - A large amount of food (residue) in the stomach.                             - Normal examined duodenum.                             - No specimens collected.   Recommendation:           - Repeat upper endoscopy at appointment to be                             scheduled. 48 hours of clear liquid prior to                             procedure.     Reviewed his labs above today and his CT scan today personally    Lab Results   Component Value Date    WBC 3.8 10/06/2022    WBC 3.4 04/26/2021     Lab Results   Component Value Date    RBC 4.86 10/06/2022    RBC 4.56 04/26/2021     Lab Results   Component Value Date    HGB 15.0 10/06/2022    HGB 13.6 04/26/2021     Lab Results   Component Value Date    HCT 43.3 10/06/2022    HCT 41.5 04/26/2021     No components found for: MCT  Lab Results   Component Value  Date    MCV 89 10/06/2022    MCV 91 04/26/2021     Lab Results   Component Value Date    MCH 30.9 10/06/2022    MCH 29.8 04/26/2021     Lab Results   Component Value Date    MCHC 34.6 10/06/2022    MCHC 32.8 04/26/2021     Lab Results   Component Value Date    RDW 12.9 10/06/2022    RDW 12.7 04/26/2021     Lab Results   Component Value Date     10/06/2022     04/26/2021     Recent Labs   Lab Test 10/06/22  0832 05/20/22  0655    146*   POTASSIUM 3.9 4.0   CHLORIDE 104 107   CO2 24 28   ANIONGAP 10 11   * 117*   BUN 24.8* 25   CR 1.16 1.04   NIDHI 9.3 8.9     Liver Function Studies - Recent Labs   Lab Test 10/06/22  0832   PROTTOTAL 7.2   ALBUMIN 4.1   BILITOTAL 0.7   ALKPHOS 87   AST 19   ALT 11         IMPRESSION/PLAN:  1. 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 He had stable disease on Keytruda, for TWO years, which is great news and then s/p chemoradiation to the primary tumor.  He has had an excellent response to chemoradiation.  He has been off all therapy since 2/2021. I reviewed his CT CAP scan with him and advised that we continue to monitor him off of therapy for now.  I discussed overall that I would like him to wait for his final CT scan report.  On my review I do not see any suspicious nodules in the lungs or in his liver.  Examining his stomach this does look to be enlarged with some associated thickening.  I recommended he have his EGD repeated.    He understands he need to have needs to have a liquid diet for 48 hours prior to his endoscopy.  We will go ahead and make these arrangements and have an associated EGD repeated.    He does appear to have gastroparesis.  We discussed taking Reglan to help with this.  Side effects were discussed and a prescription was sent out.    Esophagitis he has had erythema on his endoscopy he continues to take omeprazole.    He has a history of pulmonary embolus from 2018 and remains on Xarelto.  He has had  associated ecchymoses but overall is tolerating this therapy well.    Neuropathy continues to be his most bothersome complaint.  I advise he increase his duloxetine to twice daily.  We discussed a referral to physical therapy for balance training.  He is going to think about this.    He does have atrial fibrillation and remains on Xarelto.  His heart is in a regular regular rhythm today.     He has an appt with me next month at which time I will see him back to review his repeat endoscopy.      Kadi Dee MD

## 2022-10-11 ENCOUNTER — TELEPHONE (OUTPATIENT)
Dept: GASTROENTEROLOGY | Facility: CLINIC | Age: 62
End: 2022-10-11

## 2022-10-11 NOTE — TELEPHONE ENCOUNTER
Caller: Reuben Padilla    Procedure: EGD    Ordering Provider:Kadi Dee MD     Reason for call (please be detailed, any staff messages or encounters to note?): Schedule follow up EGD appointment      Scheduled: Y     If rescheduled:    Date: 11/16   Location:    Prep Resent: Yes (changes to prep?)   Covid Test Rescheduled: no   Note any change or update to original order/sedation: New EGD referral placed on 10/6/22, previous screening questions asked on 9/27/22 for procedure that patient had on 10/5/22. No new screening questions were asked for this procedure.

## 2022-10-16 ENCOUNTER — HEALTH MAINTENANCE LETTER (OUTPATIENT)
Age: 62
End: 2022-10-16

## 2022-11-08 ENCOUNTER — TELEPHONE (OUTPATIENT)
Dept: GASTROENTEROLOGY | Facility: CLINIC | Age: 62
End: 2022-11-08

## 2022-11-08 NOTE — TELEPHONE ENCOUNTER
Pre assessment questions completed for upcoming EGD procedure scheduled on 11/16/22    COVID policy reviewed. Patient to complete rapid antigen test one to two days before their scheduled procedure. Patient to bring photo of the results when they come in for their procedure.    Reviewed procedural arrival time 1430 and facility location .    Designated  policy reviewed. Instructed to have someone stay 6 hours post procedure.     Procedure indication: hx of cancer, dysphagia     Reviewed EGD prep instructions. 48 hour CLD per previous EGD report. NPO 2 hours prior to procedure. Will send modified instructions via [employer]     Anticoagulation/blood thinners? Rivaroxaban (Xarelto) - Hold 2 days prior to procedure. Patient states they held for two days in previous EGD completed in Oct 2022.    Diabetic? no     Patient verbalized understanding and had no questions or concerns at this time.    Lexy Whaley RN

## 2022-11-13 ENCOUNTER — TELEPHONE (OUTPATIENT)
Dept: NURSING | Facility: CLINIC | Age: 62
End: 2022-11-13

## 2022-11-13 NOTE — TELEPHONE ENCOUNTER
don procedure on Wednesday     Pt calling to see what time he should get to the hospital at for his procedure on Wednesday. Per note in telephone encounter from 11/08/22 pt arrival time is @ 1430    Pt verbalizes understanding and denies further questions at this time       Gina Morales RN [facility] November 13, 2022 4:25 PM

## 2022-11-16 ENCOUNTER — HOSPITAL ENCOUNTER (OUTPATIENT)
Facility: CLINIC | Age: 62
Discharge: HOME | End: 2022-11-16
Attending: INTERNAL MEDICINE | Admitting: INTERNAL MEDICINE
Payer: COMMERCIAL

## 2022-11-16 VITALS
HEART RATE: 99 BPM | OXYGEN SATURATION: 95 % | DIASTOLIC BLOOD PRESSURE: 84 MMHG | BODY MASS INDEX: 36.45 KG/M2 | HEIGHT: 78 IN | SYSTOLIC BLOOD PRESSURE: 112 MMHG | WEIGHT: 315 LBS | RESPIRATION RATE: 28 BRPM

## 2022-11-16 LAB — UPPER GI ENDOSCOPY: NORMAL

## 2022-11-16 PROCEDURE — 999N000099 HC STATISTIC MODERATE SEDATION < 10 MIN: Performed by: INTERNAL MEDICINE

## 2022-11-16 PROCEDURE — 43239 EGD BIOPSY SINGLE/MULTIPLE: CPT | Performed by: INTERNAL MEDICINE

## 2022-11-16 PROCEDURE — 88305 TISSUE EXAM BY PATHOLOGIST: CPT | Mod: TC | Performed by: INTERNAL MEDICINE

## 2022-11-16 PROCEDURE — G0500 MOD SEDAT ENDO SERVICE >5YRS: HCPCS | Performed by: INTERNAL MEDICINE

## 2022-11-16 PROCEDURE — 250N000009 HC RX 250: Performed by: INTERNAL MEDICINE

## 2022-11-16 PROCEDURE — 250N000011 HC RX IP 250 OP 636: Performed by: INTERNAL MEDICINE

## 2022-11-16 RX ORDER — FENTANYL CITRATE 50 UG/ML
INJECTION, SOLUTION INTRAMUSCULAR; INTRAVENOUS PRN
Status: DISCONTINUED | OUTPATIENT
Start: 2022-11-16 | End: 2022-11-16 | Stop reason: HOSPADM

## 2022-11-16 ASSESSMENT — ACTIVITIES OF DAILY LIVING (ADL)
ADLS_ACUITY_SCORE: 37
ADLS_ACUITY_SCORE: 37

## 2022-11-16 NOTE — H&P
ENDOSCOPY PRE-SEDATION H&P FOR OUTPATIENT PROCEDURES    Reuben Padilla  [id number]  1960    Procedure: Endoscopy    Pre-procedure diagnosis: History of esophageal CA, dysphagia    Past medical history:   Past Medical History:   Diagnosis Date     Arrhythmia      Arthritis 5/18/2018     Cancer (H)     esophageal     Coronary artery disease 04/2016    a fib     Glaucoma      Hypertension      Meniere's disease 05/1999    haven't had issues in yrs with this     Paroxysmal A-fib (H)      Reduced vision 07/2008    glaucoma     Tubular adenoma 02/2017     Patient Active Problem List   Diagnosis     Cancer of distal third of esophagus (H)     Acute pulmonary embolism (H)     Tear of right supraspinatus tendon     Examination of participant in clinical trial     Esophageal obstruction     Paroxysmal A-fib (H)     Orthostatic hypotension     Chemotherapy-induced neutropenia (H)     G tube feedings (H)     Varicose veins of both lower extremities with complications     Traumatic closed fracture of thoracic vertebra with minimal displacement (H)     Malignant neoplasm metastatic to liver (H)     Impotence of organic origin     Edema     Congenital stenosis of esophagus     Hx of pulmonary embolus     Other acute pulmonary embolism without acute cor pulmonale (H)     Morbid obesity (H)       Past surgical history:   Past Surgical History:   Procedure Laterality Date     AMPUTATION      toe     ARTHROSCOPY KNEE       bunion surgery       CHOLECYSTECTOMY       COLONOSCOPY  02/2017    remove 2 polyps     ESOPHAGOSCOPY, GASTROSCOPY, DUODENOSCOPY (EGD), COMBINED N/A 10/10/2018    Procedure: COMBINED ESOPHAGOSCOPY, GASTROSCOPY, DUODENOSCOPY (EGD);  Esophagogastroduodenoscopy ;  Surgeon: Leonel Joel MD;  Location:  OR     ESOPHAGOSCOPY, GASTROSCOPY, DUODENOSCOPY (EGD), COMBINED N/A 8/12/2021    Procedure: ESOPHAGOGASTRODUODENOSCOPY, WITH BIOPSY;  Surgeon: Sadia Reed MD;  [place] [facility] [place]     ESOPHAGOSCOPY,  GASTROSCOPY, DUODENOSCOPY (EGD), COMBINED N/A 11/10/2021    Procedure: ESOPHAGOGASTRODUODENOSCOPY, WITH BIOPSY;  Surgeon: Leventhal, Thomas Michael, MD;  Location: [facility] [place] OR     ESOPHAGOSCOPY, GASTROSCOPY, DUODENOSCOPY (EGD), COMBINED N/A 10/5/2022    Procedure: ESOPHAGOGASTRODUODENOSCOPY (EGD);  Surgeon: Arnold Guzman MD;  Location:  GI     INSERT PORT VASCULAR ACCESS Right 5/9/2017    Procedure: INSERT PORT VASCULAR ACCESS;  Single Lumen Chest Power Port;  Surgeon: Jasiel Hu PA-C;  Location:  OR       No current facility-administered medications for this encounter.     Facility-Administered Medications Ordered in Other Encounters   Medication     sod bicarbonate-citric acid-simethicone (EZ GAS) 2.21-1.53-0.04 g packet 4 g     sodium chloride 0.9 % bag 500mL for CT scan flush use       Allergies   Allergen Reactions     Penicillin G Sodium Unknown     Penicillin G        History of Anesthesia/Sedation Problems: no    Physical Exam:    Mental status: alert  Heart: Normal  Lung: Normal  Assessment of patient's airway: Normal  Other as pertinent for procedure: None     Lab Results   Component Value Date    WBC 3.8 10/06/2022    WBC 3.4 04/26/2021     Lab Results   Component Value Date    RBC 4.86 10/06/2022    RBC 4.56 04/26/2021     Lab Results   Component Value Date    HGB 15.0 10/06/2022    HGB 13.6 04/26/2021     Lab Results   Component Value Date    HCT 43.3 10/06/2022    HCT 41.5 04/26/2021     Lab Results   Component Value Date    MCV 89 10/06/2022    MCV 91 04/26/2021     Lab Results   Component Value Date    MCH 30.9 10/06/2022    MCH 29.8 04/26/2021     Lab Results   Component Value Date    MCHC 34.6 10/06/2022    MCHC 32.8 04/26/2021     Lab Results   Component Value Date    RDW 12.9 10/06/2022    RDW 12.7 04/26/2021     Lab Results   Component Value Date     10/06/2022     04/26/2021     INR   Date Value Ref Range Status   12/31/2021 1.38 (H) 0.85 - 1.15 Final   04/26/2021  2.09 (H) 0.86 - 1.14 Final        ASA Score: See Provation note    Mallampati score:  II - Faucial pillars and soft palate may be seen, but uvula is masked by the base of the tongue    Assessment/Plan:     The patient is an appropriate candidate to receive sedation.    Informed consent was discussed with the patient/family, including the risks, benefits, potential complications and any alternative options associated with sedation.    Patient assessment completed just prior to sedation and while under constant observation by the provider. Condition determined to be adequate for proceeding with sedation.    The specific risks for the procedure were discussed with the patient at the time of informed consent and include but are not limited to perforation which could require surgery, missing significant neoplasm or lesion, hemorrhage and adverse sedative complication.      Dao Bhakta MD

## 2022-11-21 ENCOUNTER — ONCOLOGY VISIT (OUTPATIENT)
Dept: ONCOLOGY | Facility: CLINIC | Age: 62
End: 2022-11-21
Attending: INTERNAL MEDICINE
Payer: COMMERCIAL

## 2022-11-21 VITALS
WEIGHT: 315 LBS | SYSTOLIC BLOOD PRESSURE: 108 MMHG | BODY MASS INDEX: 39.07 KG/M2 | OXYGEN SATURATION: 99 % | DIASTOLIC BLOOD PRESSURE: 78 MMHG | HEART RATE: 90 BPM | RESPIRATION RATE: 16 BRPM | TEMPERATURE: 97.8 F

## 2022-11-21 DIAGNOSIS — C15.5 CANCER OF DISTAL THIRD OF ESOPHAGUS (H): ICD-10-CM

## 2022-11-21 DIAGNOSIS — C79.9 METASTASIS FROM GASTRIC CANCER (H): ICD-10-CM

## 2022-11-21 DIAGNOSIS — Z23 NEED FOR PROPHYLACTIC VACCINATION AND INOCULATION AGAINST INFLUENZA: Primary | ICD-10-CM

## 2022-11-21 DIAGNOSIS — C16.9 METASTASIS FROM GASTRIC CANCER (H): ICD-10-CM

## 2022-11-21 LAB
PATH REPORT.COMMENTS IMP SPEC: NORMAL
PATH REPORT.COMMENTS IMP SPEC: NORMAL
PATH REPORT.FINAL DX SPEC: NORMAL
PATH REPORT.GROSS SPEC: NORMAL
PATH REPORT.MICROSCOPIC SPEC OTHER STN: NORMAL
PATH REPORT.RELEVANT HX SPEC: NORMAL
PHOTO IMAGE: NORMAL

## 2022-11-21 PROCEDURE — 250N000011 HC RX IP 250 OP 636: Performed by: INTERNAL MEDICINE

## 2022-11-21 PROCEDURE — 99214 OFFICE O/P EST MOD 30 MIN: CPT | Performed by: INTERNAL MEDICINE

## 2022-11-21 PROCEDURE — G0463 HOSPITAL OUTPT CLINIC VISIT: HCPCS

## 2022-11-21 PROCEDURE — 88342 IMHCHEM/IMCYTCHM 1ST ANTB: CPT | Mod: 26

## 2022-11-21 PROCEDURE — 90682 RIV4 VACC RECOMBINANT DNA IM: CPT | Performed by: INTERNAL MEDICINE

## 2022-11-21 PROCEDURE — G0008 ADMIN INFLUENZA VIRUS VAC: HCPCS | Performed by: INTERNAL MEDICINE

## 2022-11-21 PROCEDURE — 88305 TISSUE EXAM BY PATHOLOGIST: CPT | Mod: 26

## 2022-11-21 RX ADMIN — INFLUENZA A VIRUS A/WISCONSIN/588/2019 (H1N1) RECOMBINANT HEMAGGLUTININ ANTIGEN, INFLUENZA A VIRUS A/DARWIN/6/2021 (H3N2) RECOMBINANT HEMAGGLUTININ ANTIGEN, INFLUENZA B VIRUS B/AUSTRIA/1359417/2021 RECOMBINANT HEMAGGLUTININ ANTIGEN, AND INFLUENZA B VIRUS B/PHUKET/3073/2013 RECOMBINANT HEMAGGLUTININ ANTIGEN 0.5 ML: 45; 45; 45; 45 INJECTION INTRAMUSCULAR at 09:41

## 2022-11-21 ASSESSMENT — PAIN SCALES - GENERAL: PAINLEVEL: NO PAIN (0)

## 2022-11-21 NOTE — LETTER
11/21/2022         RE: Reuben Padilla  [address] [facility] [address]        Dear Colleague,    Thank you for referring your patient, Reuben Padilla, to the [facility]. Please see a copy of my visit note below.        HEMATOLOGY/ONCOLOGY PROGRESS NOTE  Nov 21, 2022    Care team: Dr Dee/Nicole Sutherland PA-C/Mansi Wetzel RN     REASON FOR VISIT: follow-up metastatic esophogeal cancer, received keytruda x 2 years, followed by concurrent chemo XRT (weekly carbo/taxol + XRT), now being monitored off all therapy. 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 His tumor is positive for cytokeratin 20, negative for P63 and CK7, and HER2 is negative. PDL1 + weakly.  He started on FOLFOX (5FU/oxaliplatin) on 5/15/2017. He had a delay in treatment from 9/5-10/2/17 due to work related injury.     He had an excellent response by imaging throughout the summer 2017.    He a mixed response by imaging in late fall 2017.    In January 2018, he was switched to taxol and cyramza - had slight progression and neuropathy and clinical trial became available.        In March 2018, he was started on the [facility] Match Clinical Trial with crizotinib.  (MET amplification) He remained on crizotinib from March - July 2018.     August 2018, he was switched back to taxol/cramza.  In October 2018, he was switched to Keytruda (PD1 overexpressor).              -April 2019, his infusion was held for three weeks, and he was treated with prednisone and Enbrel for grade 3 arthralgias. Keytruda was resumed              -May 2020 he started Actemra (5/14).  This was initially WEEKLY and then in August- moved to QOW (secondary to fatigue and concerns for low WBC).                -August 2020 moved to q6 week Keytruda (still at the 200 mg dose)              -November 2020 moved back  to q3w Keytruda  CT 12/30/2020: progression of esophageal mass   1/14/21: PET showing growth of primary tumor only, discussion of radiation   Keytruda was discontinued at that time.  1/28/21: chemoradiation with weekly carbo/taxol and daily radiation.  Completed 3/3/21 received 5000 cGY     INTERVAL HISTORY:   Levi returns for follow up today. No new changes to his health.     He underwent a repeat endoscopy last week.  He reports no dysphagia right now.  He took reglan, and then discontinued it worried about possible tardive dyskinesia.      He has good energy.  His other complaints are that he has his neuropathy and this continues to bother him.  He is currently on duloxetine; he increased this to twice daily.      He remains on Xarelto.        ROS: 10 point ROS neg other than the symptoms noted above in the HPI.     PHYSICAL EXAMINATION  Wt Readings from Last 4 Encounters:   11/21/22 (!) 153.4 kg (338 lb 1.6 oz)   11/16/22 (!) 150.6 kg (332 lb)   10/06/22 (!) 155 kg (341 lb 12.8 oz)   10/05/22 147.4 kg (325 lb)     /78 (BP Location: Right arm, Patient Position: Sitting, Cuff Size: Adult Large)   Pulse 90   Temp 97.8  F (36.6  C) (Oral)   Resp 16   Wt (!) 153.4 kg (338 lb 1.6 oz)   SpO2 99%   BMI 39.07 kg/m    Vital signs were reviewed.   Patient alert and oriented x3.   PERRLA. EOMI. No scleral icterus noted.   Neck exam: No palpable cervical, supraclavicular or axillary nodes bilaterally.   Heart:Mildly tachy, no murmur   Lungs: clear to auscultation bilaterally.  No crackles or wheezing.   Abd: positive bowel sounds in all four quadrants.  No tenderness to palpation.  No hepatomegaly.     Extremities: No lower extremity edema,  Neuro: grossly intact. Absent reflexes from knees to below.  Mood and affect is stable.       Labs:      Labs pending    CT CAP reviewed by me, final report pending  EGD 10.5.22  Findings:        Moderate erythema was found in the lower third of the esophagus. No         definitive malignancy or stricture, however detailed exm not performed        due to food in stomach.        A large amount of food (residue) was found in the entire examined        stomach. Pylorus was intubated, no evidence for gastric outlet        obstruction.        The examined duodenum was normal.                                                                                     Impression:               - Erythema in the lower third of the esophagus.                             - A large amount of food (residue) in the stomach.                             - Normal examined duodenum.                             - No specimens collected.   Recommendation:           - Repeat upper endoscopy at appointment to be                             scheduled. 48 hours of clear liquid prior to                             procedure.     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 Biopsies were        taken with a cold forceps for histology.        Diffuse mildly erythematous mucosa was found in the stomach. No        worrisome lesions were observed.        The examined duodenum was normal.                                                                                     Impression:               EGD performed because of mild dysphagia in a                             patient with metastatic esophageal cancer that has                             been treated with systemic and radiation therapy.                             No obstruction or overt maligancy was noted. The                             mucosa near the EG junction suggests Baron's                             esophagus, but biopsies were taken to rule out                             recurrent malignancy. A significant stricture was                             not noted.     Lab Results   Component Value Date    WBC 3.8 10/06/2022    WBC 3.4 04/26/2021     Lab  Results   Component Value Date    RBC 4.86 10/06/2022    RBC 4.56 04/26/2021     Lab Results   Component Value Date    HGB 15.0 10/06/2022    HGB 13.6 04/26/2021     Lab Results   Component Value Date    HCT 43.3 10/06/2022    HCT 41.5 04/26/2021     No components found for: MCT  Lab Results   Component Value Date    MCV 89 10/06/2022    MCV 91 04/26/2021     Lab Results   Component Value Date    MCH 30.9 10/06/2022    MCH 29.8 04/26/2021     Lab Results   Component Value Date    MCHC 34.6 10/06/2022    MCHC 32.8 04/26/2021     Lab Results   Component Value Date    RDW 12.9 10/06/2022    RDW 12.7 04/26/2021     Lab Results   Component Value Date     10/06/2022     04/26/2021       Recent Labs   Lab Test 10/06/22  0832 05/20/22  0655    146*   POTASSIUM 3.9 4.0   CHLORIDE 104 107   CO2 24 28   ANIONGAP 10 11   * 117*   BUN 24.8* 25   CR 1.16 1.04   NIDHI 9.3 8.9     Liver Function Studies - Recent Labs   Lab Test 10/06/22  0832   PROTTOTAL 7.2   ALBUMIN 4.1   BILITOTAL 0.7   ALKPHOS 87   AST 19   ALT 11       IMPRESSION/PLAN:  1. 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 He had stable disease on Keytruda, for TWO years, which is great news and then s/p chemoradiation to the primary tumor.  He has had an excellent response to chemoradiation.  He has been off all therapy since 2/2021. I reviewed his CT CAP scan with him and advised that we continue to monitor him off of therapy for now.     2. Dysphagia -  He does appear to have gastroparesis.  We discussed taking Reglan to help with this.    He had a repeat endoscopy last week.  The pathology revealed inflammation.  Overall appearance of the endoscopy was suggestive of Barretts esophagus.    We discussed continuing omeprazole.  He is now off of reglan.    3. He has a history of pulmonary embolus from 2018 and remains on Xarelto.  He has had associated ecchymoses but overall is tolerating this therapy well.    4.  Neuropathy continues to be his most bothersome complaint. Continue duloxetine  twice daily.       5. He does have atrial fibrillation and remains on Xarelto.  His heart is in a regular regular rhythm today.     Kadi Dee MD       Again, thank you for allowing me to participate in the care of your patient.        Sincerely,        Kadi Dee MD

## 2022-11-21 NOTE — LETTER
Date:November 22, 2022      Patient was self referred, no letter generated. Do not send.        [facility] Health Information       No

## 2022-11-21 NOTE — RESULT ENCOUNTER NOTE
Mr. Padilla:    At the time of your recent upper GI endoscopy, we took biopsies of your lower esophagus. These biopsies did not show any evidence of cancer or other worrisome changes.    I suggest that you continue to follow with Dr. Dee.    If you have any questions, please contact the Gastroenterology nurse at [phone].     - Dr. Bhakta

## 2022-11-21 NOTE — NURSING NOTE
"Oncology Rooming Note    November 21, 2022 9:24 AM   Reuben Padilla is a 62 year old male who presents for:    Chief Complaint   Patient presents with     Oncology Clinic Visit     Esophageal Cancer      Initial Vitals: /78 (BP Location: Right arm, Patient Position: Sitting, Cuff Size: Adult Large)   Pulse 90   Temp 97.8  F (36.6  C) (Oral)   Resp 16   Wt (!) 153.4 kg (338 lb 1.6 oz)   SpO2 99%   BMI 39.07 kg/m   Estimated body mass index is 39.07 kg/m  as calculated from the following:    Height as of 11/16/22: 1.981 m (6' 6\").    Weight as of this encounter: 153.4 kg (338 lb 1.6 oz). Body surface area is 2.91 meters squared.  No Pain (0) Comment: Data Unavailable   No LMP for male patient.  Allergies reviewed: Yes  Medications reviewed: Yes    Medications: Medication refills not needed today.  Pharmacy name entered into [employer]: [facility] - [facility], [address]    Clinical concerns:  None       Hamlet Bernal            "

## 2022-11-21 NOTE — PROGRESS NOTES
HEMATOLOGY/ONCOLOGY PROGRESS NOTE  Nov 21, 2022    Care team: Dr Dee/Nicole Sutherland PA-C/Mansi Wetzel RN     REASON FOR VISIT: follow-up metastatic esophogeal cancer, received keytruda x 2 years, followed by concurrent chemo XRT (weekly carbo/taxol + XRT), now being monitored off all therapy. 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 His tumor is positive for cytokeratin 20, negative for P63 and CK7, and HER2 is negative. PDL1 + weakly.  He started on FOLFOX (5FU/oxaliplatin) on 5/15/2017. He had a delay in treatment from 9/5-10/2/17 due to work related injury.     He had an excellent response by imaging throughout the summer 2017.    He a mixed response by imaging in late fall 2017.    In January 2018, he was switched to taxol and cyramza - had slight progression and neuropathy and clinical trial became available.        In March 2018, he was started on the [facility] Match Clinical Trial with crizotinib.  (MET amplification) He remained on crizotinib from March - July 2018.     August 2018, he was switched back to taxol/cramza.  In October 2018, he was switched to Keytruda (PD1 overexpressor).              -April 2019, his infusion was held for three weeks, and he was treated with prednisone and Enbrel for grade 3 arthralgias. Keytruda was resumed              -May 2020 he started Actemra (5/14).  This was initially WEEKLY and then in August- moved to QOW (secondary to fatigue and concerns for low WBC).                -August 2020 moved to q6 week Keytruda (still at the 200 mg dose)              -November 2020 moved back to q3w Keytruda  CT 12/30/2020: progression of esophageal mass   1/14/21: PET showing growth of primary tumor only, discussion of radiation   Keytruda was discontinued at that time.  1/28/21: chemoradiation with weekly carbo/taxol and daily radiation.  Completed 3/3/21  received 5000 cGY     INTERVAL HISTORY:   Levi returns for follow up today. No new changes to his health.     He underwent a repeat endoscopy last week.  He reports no dysphagia right now.  He took reglan, and then discontinued it worried about possible tardive dyskinesia.      He has good energy.  His other complaints are that he has his neuropathy and this continues to bother him.  He is currently on duloxetine; he increased this to twice daily.      He remains on Xarelto.        ROS: 10 point ROS neg other than the symptoms noted above in the HPI.     PHYSICAL EXAMINATION  Wt Readings from Last 4 Encounters:   11/21/22 (!) 153.4 kg (338 lb 1.6 oz)   11/16/22 (!) 150.6 kg (332 lb)   10/06/22 (!) 155 kg (341 lb 12.8 oz)   10/05/22 147.4 kg (325 lb)     /78 (BP Location: Right arm, Patient Position: Sitting, Cuff Size: Adult Large)   Pulse 90   Temp 97.8  F (36.6  C) (Oral)   Resp 16   Wt (!) 153.4 kg (338 lb 1.6 oz)   SpO2 99%   BMI 39.07 kg/m    Vital signs were reviewed.   Patient alert and oriented x3.   PERRLA. EOMI. No scleral icterus noted.   Neck exam: No palpable cervical, supraclavicular or axillary nodes bilaterally.   Heart:Mildly tachy, no murmur   Lungs: clear to auscultation bilaterally.  No crackles or wheezing.   Abd: positive bowel sounds in all four quadrants.  No tenderness to palpation.  No hepatomegaly.     Extremities: No lower extremity edema,  Neuro: grossly intact. Absent reflexes from knees to below.  Mood and affect is stable.       Labs:      Labs pending    CT CAP reviewed by me, final report pending  EGD 10.5.22  Findings:        Moderate erythema was found in the lower third of the esophagus. No        definitive malignancy or stricture, however detailed exm not performed        due to food in stomach.        A large amount of food (residue) was found in the entire examined        stomach. Pylorus was intubated, no evidence for gastric outlet        obstruction.         The examined duodenum was normal.                                                                                     Impression:               - Erythema in the lower third of the esophagus.                             - A large amount of food (residue) in the stomach.                             - Normal examined duodenum.                             - No specimens collected.   Recommendation:           - Repeat upper endoscopy at appointment to be                             scheduled. 48 hours of clear liquid prior to                             procedure.     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 Biopsies were        taken with a cold forceps for histology.        Diffuse mildly erythematous mucosa was found in the stomach. No        worrisome lesions were observed.        The examined duodenum was normal.                                                                                     Impression:               EGD performed because of mild dysphagia in a                             patient with metastatic esophageal cancer that has                             been treated with systemic and radiation therapy.                             No obstruction or overt maligancy was noted. The                             mucosa near the EG junction suggests Baron's                             esophagus, but biopsies were taken to rule out                             recurrent malignancy. A significant stricture was                             not noted.     Lab Results   Component Value Date    WBC 3.8 10/06/2022    WBC 3.4 04/26/2021     Lab Results   Component Value Date    RBC 4.86 10/06/2022    RBC 4.56 04/26/2021     Lab Results   Component Value Date    HGB 15.0 10/06/2022    HGB 13.6 04/26/2021     Lab Results   Component Value Date    HCT 43.3 10/06/2022    HCT 41.5 04/26/2021     No components found for:  MCT  Lab Results   Component Value Date    MCV 89 10/06/2022    MCV 91 04/26/2021     Lab Results   Component Value Date    MCH 30.9 10/06/2022    MCH 29.8 04/26/2021     Lab Results   Component Value Date    MCHC 34.6 10/06/2022    MCHC 32.8 04/26/2021     Lab Results   Component Value Date    RDW 12.9 10/06/2022    RDW 12.7 04/26/2021     Lab Results   Component Value Date     10/06/2022     04/26/2021       Recent Labs   Lab Test 10/06/22  0832 05/20/22  0655    146*   POTASSIUM 3.9 4.0   CHLORIDE 104 107   CO2 24 28   ANIONGAP 10 11   * 117*   BUN 24.8* 25   CR 1.16 1.04   NIDHI 9.3 8.9     Liver Function Studies - Recent Labs   Lab Test 10/06/22  0832   PROTTOTAL 7.2   ALBUMIN 4.1   BILITOTAL 0.7   ALKPHOS 87   AST 19   ALT 11       IMPRESSION/PLAN:  1. 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 He had stable disease on Keytruda, for TWO years, which is great news and then s/p chemoradiation to the primary tumor.  He has had an excellent response to chemoradiation.  He has been off all therapy since 2/2021. I reviewed his CT CAP scan with him and advised that we continue to monitor him off of therapy for now.     2. Dysphagia -  He does appear to have gastroparesis.  We discussed taking Reglan to help with this.    He had a repeat endoscopy last week.  The pathology revealed inflammation.  Overall appearance of the endoscopy was suggestive of Barretts esophagus.    We discussed continuing omeprazole.  He is now off of reglan.    3. He has a history of pulmonary embolus from 2018 and remains on Xarelto.  He has had associated ecchymoses but overall is tolerating this therapy well.    4. Neuropathy continues to be his most bothersome complaint. Continue duloxetine  twice daily.       5. He does have atrial fibrillation and remains on Xarelto.  His heart is in a regular regular rhythm today.     Kadi Dee MD

## 2022-11-21 NOTE — NURSING NOTE
After obtaining informed consent, the immunization was given by [employer]. Patient tolerated without incident and is seeing the provider in clinic. Please see MAR for additional details.       Sheba Rosales LPN November 21, 2022 9:47 AM

## 2023-01-06 ENCOUNTER — TELEPHONE (OUTPATIENT)
Dept: ONCOLOGY | Facility: CLINIC | Age: 63
End: 2023-01-06

## 2023-01-06 NOTE — TELEPHONE ENCOUNTER
Forms filled out to limit of scope and placed in provider folder to complete and sign.    Anat Orozco CMA on 1/6/2023 at 11:31 AM

## 2023-01-06 NOTE — TELEPHONE ENCOUNTER
LTD paperwork received via fax from [employer]. Will be placed in provider folder for signature upon completion.     Fax: [phone] attn: Sangeeta Orozco CMA

## 2023-01-28 NOTE — PROGRESS NOTES
Contrast Dose Calculator:   Patient's age: 38.   Patient's sex: Male.   Patient weight (kg) = 104.3.   Creatinine level (mg/dL) = 1.96.   Creatinine clearance (mL/min): 75.   Contrast concentration (mg/mL) = 370.   MACD = 215.73 mL.  Max Contrast dose per Creatinine Cl calculator = 168.75 mL.    Met with Reuben to discuss chemotherapy and resources available at the [facility]. Provided patient with  [employer] printouts on [employer]. Reviewed administration, side effects (including myelosuppression, nausea/vomiting, diarrhea/constipation, hair loss, mouth sores) and ongoing symptom management by APPs in clinic. Provided phone numbers for triage and after hours care. Highlighted steps to expect when getting chemotherapy (check in, labs, pre- meds, infusion), Discussed that chemo treatment may be delayed a day or two due to blood counts, infusion schedule or patient's need to modify. Included a one page resource of symptoms and when to contact the Cancer Clinic with questions or concerns.      Reuben signed Authorization to Discuss paperwork. Answered all Reuben's questions to his stated satisfaction.

## 2023-02-07 NOTE — TELEPHONE ENCOUNTER
Signed form received and faxed to [employer], fax # [phone]. Successful transmission verified via rightfax. Copy of forms sent to scanning, original forms mailed to patient's home address on file.    Hamlet Bernal on 2/7/2023 at 7:51 AM

## 2023-05-01 ENCOUNTER — ANCILLARY PROCEDURE (OUTPATIENT)
Dept: CT IMAGING | Facility: CLINIC | Age: 63
End: 2023-05-01
Attending: INTERNAL MEDICINE
Payer: COMMERCIAL

## 2023-05-01 ENCOUNTER — APPOINTMENT (OUTPATIENT)
Dept: LAB | Facility: CLINIC | Age: 63
End: 2023-05-01
Attending: INTERNAL MEDICINE
Payer: COMMERCIAL

## 2023-05-01 ENCOUNTER — ONCOLOGY VISIT (OUTPATIENT)
Dept: ONCOLOGY | Facility: CLINIC | Age: 63
End: 2023-05-01
Attending: INTERNAL MEDICINE
Payer: COMMERCIAL

## 2023-05-01 VITALS
DIASTOLIC BLOOD PRESSURE: 68 MMHG | SYSTOLIC BLOOD PRESSURE: 106 MMHG | OXYGEN SATURATION: 97 % | HEART RATE: 89 BPM | RESPIRATION RATE: 16 BRPM | BODY MASS INDEX: 40.2 KG/M2 | WEIGHT: 315 LBS | TEMPERATURE: 97.8 F

## 2023-05-01 DIAGNOSIS — G62.9 NEUROPATHY: ICD-10-CM

## 2023-05-01 DIAGNOSIS — C16.9 METASTASIS FROM GASTRIC CANCER (H): ICD-10-CM

## 2023-05-01 DIAGNOSIS — C79.9 METASTASIS FROM GASTRIC CANCER (H): ICD-10-CM

## 2023-05-01 DIAGNOSIS — R13.10 DYSPHAGIA, UNSPECIFIED TYPE: Primary | ICD-10-CM

## 2023-05-01 LAB
ALBUMIN SERPL BCG-MCNC: 3.9 G/DL (ref 3.5–5.2)
ALP SERPL-CCNC: 95 U/L (ref 40–129)
ALT SERPL W P-5'-P-CCNC: 12 U/L (ref 10–50)
ANION GAP SERPL CALCULATED.3IONS-SCNC: 10 MMOL/L (ref 7–15)
AST SERPL W P-5'-P-CCNC: 19 U/L (ref 10–50)
BASOPHILS # BLD AUTO: 0 10E3/UL (ref 0–0.2)
BASOPHILS NFR BLD AUTO: 0 %
BILIRUB SERPL-MCNC: 0.7 MG/DL
BUN SERPL-MCNC: 23.3 MG/DL (ref 8–23)
CALCIUM SERPL-MCNC: 9.2 MG/DL (ref 8.8–10.2)
CHLORIDE SERPL-SCNC: 106 MMOL/L (ref 98–107)
CREAT SERPL-MCNC: 1.06 MG/DL (ref 0.67–1.17)
DEPRECATED HCO3 PLAS-SCNC: 25 MMOL/L (ref 22–29)
EOSINOPHIL # BLD AUTO: 0.1 10E3/UL (ref 0–0.7)
EOSINOPHIL NFR BLD AUTO: 1 %
ERYTHROCYTE [DISTWIDTH] IN BLOOD BY AUTOMATED COUNT: 13.4 % (ref 10–15)
GFR SERPL CREATININE-BSD FRML MDRD: 79 ML/MIN/1.73M2
GLUCOSE SERPL-MCNC: 120 MG/DL (ref 70–99)
HCT VFR BLD AUTO: 41 % (ref 40–53)
HGB BLD-MCNC: 13.8 G/DL (ref 13.3–17.7)
IMM GRANULOCYTES # BLD: 0.1 10E3/UL
IMM GRANULOCYTES NFR BLD: 1 %
LYMPHOCYTES # BLD AUTO: 0.8 10E3/UL (ref 0.8–5.3)
LYMPHOCYTES NFR BLD AUTO: 14 %
MCH RBC QN AUTO: 29.4 PG (ref 26.5–33)
MCHC RBC AUTO-ENTMCNC: 33.7 G/DL (ref 31.5–36.5)
MCV RBC AUTO: 87 FL (ref 78–100)
MONOCYTES # BLD AUTO: 0.3 10E3/UL (ref 0–1.3)
MONOCYTES NFR BLD AUTO: 6 %
NEUTROPHILS # BLD AUTO: 4.3 10E3/UL (ref 1.6–8.3)
NEUTROPHILS NFR BLD AUTO: 78 %
NRBC # BLD AUTO: 0 10E3/UL
NRBC BLD AUTO-RTO: 0 /100
PLATELET # BLD AUTO: 189 10E3/UL (ref 150–450)
POTASSIUM SERPL-SCNC: 3.9 MMOL/L (ref 3.4–5.3)
PROT SERPL-MCNC: 7.4 G/DL (ref 6.4–8.3)
RBC # BLD AUTO: 4.69 10E6/UL (ref 4.4–5.9)
SODIUM SERPL-SCNC: 141 MMOL/L (ref 136–145)
TSH SERPL DL<=0.005 MIU/L-ACNC: 1.63 UIU/ML (ref 0.3–4.2)
WBC # BLD AUTO: 5.5 10E3/UL (ref 4–11)

## 2023-05-01 PROCEDURE — 71260 CT THORAX DX C+: CPT | Performed by: RADIOLOGY

## 2023-05-01 PROCEDURE — 99214 OFFICE O/P EST MOD 30 MIN: CPT | Performed by: INTERNAL MEDICINE

## 2023-05-01 PROCEDURE — 80053 COMPREHEN METABOLIC PANEL: CPT | Performed by: INTERNAL MEDICINE

## 2023-05-01 PROCEDURE — 74177 CT ABD & PELVIS W/CONTRAST: CPT | Performed by: RADIOLOGY

## 2023-05-01 PROCEDURE — 250N000011 HC RX IP 250 OP 636: Performed by: INTERNAL MEDICINE

## 2023-05-01 PROCEDURE — G0463 HOSPITAL OUTPT CLINIC VISIT: HCPCS | Performed by: INTERNAL MEDICINE

## 2023-05-01 PROCEDURE — 85004 AUTOMATED DIFF WBC COUNT: CPT | Performed by: INTERNAL MEDICINE

## 2023-05-01 PROCEDURE — 84443 ASSAY THYROID STIM HORMONE: CPT | Performed by: INTERNAL MEDICINE

## 2023-05-01 PROCEDURE — 36591 DRAW BLOOD OFF VENOUS DEVICE: CPT | Performed by: INTERNAL MEDICINE

## 2023-05-01 RX ORDER — HEPARIN SODIUM (PORCINE) LOCK FLUSH IV SOLN 100 UNIT/ML 100 UNIT/ML
500 SOLUTION INTRAVENOUS ONCE
Status: COMPLETED | OUTPATIENT
Start: 2023-05-01 | End: 2023-05-01

## 2023-05-01 RX ORDER — IOPAMIDOL 755 MG/ML
122 INJECTION, SOLUTION INTRAVASCULAR ONCE
Status: COMPLETED | OUTPATIENT
Start: 2023-05-01 | End: 2023-05-01

## 2023-05-01 RX ORDER — HEPARIN SODIUM (PORCINE) LOCK FLUSH IV SOLN 100 UNIT/ML 100 UNIT/ML
5 SOLUTION INTRAVENOUS ONCE
Status: COMPLETED | OUTPATIENT
Start: 2023-05-01 | End: 2023-05-01

## 2023-05-01 RX ADMIN — IOPAMIDOL 122 ML: 755 INJECTION, SOLUTION INTRAVASCULAR at 10:25

## 2023-05-01 RX ADMIN — HEPARIN SODIUM (PORCINE) LOCK FLUSH IV SOLN 100 UNIT/ML 500 UNITS: 100 SOLUTION at 10:39

## 2023-05-01 RX ADMIN — Medication 5 ML: at 09:40

## 2023-05-01 ASSESSMENT — PAIN SCALES - GENERAL
PAINLEVEL: NO PAIN (0)
PAINLEVEL: MILD PAIN (2)

## 2023-05-01 NOTE — PROGRESS NOTES
HEMATOLOGY/ONCOLOGY PROGRESS NOTE  May 1, 2023    Care team: Dr Dee/Nicole Sutherland PA-C/Mansi Wetzel RN     REASON FOR VISIT: follow-up metastatic esophogeal cancer, received keytruda x 2 years, followed by concurrent chemo XRT (weekly carbo/taxol + XRT), now being monitored off all therapy. 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 His tumor is positive for cytokeratin 20, negative for P63 and CK7, and HER2 is negative. PDL1 + weakly.  He started on FOLFOX (5FU/oxaliplatin) on 5/15/2017. He had a delay in treatment from 9/5-10/2/17 due to work related injury.     He had an excellent response by imaging throughout the summer 2017.    He a mixed response by imaging in late fall 2017.    In January 2018, he was switched to taxol and cyramza - had slight progression and neuropathy and clinical trial became available.        In March 2018, he was started on the [facility] Match Clinical Trial with crizotinib.  (MET amplification) He remained on crizotinib from March - July 2018.     August 2018, he was switched back to taxol/cramza.  In October 2018, he was switched to Keytruda (PD1 overexpressor).              -April 2019, his infusion was held for three weeks, and he was treated with prednisone and Enbrel for grade 3 arthralgias. Keytruda was resumed              -May 2020 he started Actemra (5/14).  This was initially WEEKLY and then in August- moved to QOW (secondary to fatigue and concerns for low WBC).                -August 2020 moved to q6 week Keytruda (still at the 200 mg dose)              -November 2020 moved back to q3w Keytruda  CT 12/30/2020: progression of esophageal mass   1/14/21: PET showing growth of primary tumor only, discussion of radiation   Keytruda was discontinued at that time.  1/28/21: chemoradiation with weekly carbo/taxol and daily radiation.  Completed 3/3/21  received 5000 cGY     INTERVAL HISTORY:   Levi returns for follow up today. No new changes to his health.     He reports no dysphagia.  No n/v/d/c.    He has good energy.  His other complaints are that he has his neuropathy and this continues to bother him.  He tried a variety of therapies including duloxetine and laser therapy.  He is now on no therapy.     He remains on Xarelto.       He is going to move back to [place], and has been busy hauling.     ROS: 10 point ROS neg other than the symptoms noted above in the HPI.     PHYSICAL EXAMINATION  Wt Readings from Last 4 Encounters:   05/01/23 (!) 157.8 kg (347 lb 14.2 oz)   11/21/22 (!) 153.4 kg (338 lb 1.6 oz)   11/16/22 (!) 150.6 kg (332 lb)   10/06/22 (!) 155 kg (341 lb 12.8 oz)     /68   Pulse 89   Temp 97.8  F (36.6  C) (Oral)   Resp 16   Wt (!) 157.8 kg (347 lb 14.2 oz)   SpO2 97%   BMI 40.20 kg/m    Vital signs were reviewed.   Patient alert and oriented x3.   PERRLA. EOMI. No scleral icterus noted.   Neck exam: No palpable cervical, supraclavicular or axillary nodes bilaterally.   Heart:Mildly tachy, no murmur   Lungs: clear to auscultation bilaterally.  No crackles or wheezing.   Abd: positive bowel sounds in all four quadrants.  No tenderness to palpation.  No hepatomegaly.     Extremities: No lower extremity edema,  Neuro: grossly intact. Absent reflexes from knees to below.  Mood and affect is stable.       Labs:      Labs pending     EGD 10.5.22  Findings:        Moderate erythema was found in the lower third of the esophagus. No        definitive malignancy or stricture, however detailed exm not performed        due to food in stomach.        A large amount of food (residue) was found in the entire examined        stomach. Pylorus was intubated, no evidence for gastric outlet        obstruction.        The examined duodenum was normal.                                                                                     Impression:                - Erythema in the lower third of the esophagus.                             - A large amount of food (residue) in the stomach.                             - Normal examined duodenum.                             - No specimens collected.   Recommendation:           - Repeat upper endoscopy at appointment to be                             scheduled. 48 hours of clear liquid prior to                             procedure.     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 Biopsies were        taken with a cold forceps for histology.        Diffuse mildly erythematous mucosa was found in the stomach. No        worrisome lesions were observed.        The examined duodenum was normal.                                                                                     Impression:               EGD performed because of mild dysphagia in a                             patient with metastatic esophageal cancer that has                             been treated with systemic and radiation therapy.                             No obstruction or overt maligancy was noted. The                             mucosa near the EG junction suggests Baron's                             esophagus, but biopsies were taken to rule out                             recurrent malignancy. A significant stricture was                             not noted.      Lab Results   Component Value Date    WBC 5.5 05/01/2023    WBC 3.4 04/26/2021     Lab Results   Component Value Date    RBC 4.69 05/01/2023    RBC 4.56 04/26/2021     Lab Results   Component Value Date    HGB 13.8 05/01/2023    HGB 13.6 04/26/2021     Lab Results   Component Value Date    HCT 41.0 05/01/2023    HCT 41.5 04/26/2021     No components found for: MCT  Lab Results   Component Value Date    MCV 87 05/01/2023    MCV 91 04/26/2021     Lab Results   Component Value Date    MCH 29.4 05/01/2023     MCH 29.8 04/26/2021     Lab Results   Component Value Date    MCHC 33.7 05/01/2023    MCHC 32.8 04/26/2021     Lab Results   Component Value Date    RDW 13.4 05/01/2023    RDW 12.7 04/26/2021     Lab Results   Component Value Date     05/01/2023     04/26/2021       Recent Labs   Lab Test 05/01/23  0939 10/06/22  0832    138   POTASSIUM 3.9 3.9   CHLORIDE 106 104   CO2 25 24   ANIONGAP 10 10   * 118*   BUN 23.3* 24.8*   CR 1.06 1.16   NIDHI 9.2 9.3     Liver Function Studies - Recent Labs   Lab Test 05/01/23 0939   PROTTOTAL 7.4   ALBUMIN 3.9   BILITOTAL 0.7   ALKPHOS 95   AST 19   ALT 12     CT CAP - no evidence of recurrent or metastatic disease      IMPRESSION/PLAN:  1. 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 He had stable disease on Keytruda, for TWO years,  and then s/p chemoradiation to the primary tumor.    He has been off all therapy since 2/2021. I reviewed his CT CAP scan with him and advised that we continue to monitor him off of therapy for now.   I am thrilled with how he is doing.    2. Dysphagia -  He does appear to have gastroparesis.  Today this is not bothering him.  He had a repeat endoscopy last fall. The pathology revealed inflammation.  Overall appearance of the endoscopy was suggestive of Barretts esophagus.    We discussed continuing omeprazole.  He is now off of reglan.    3. He has a history of pulmonary embolus from 2018 and remains on Xarelto.       4. Neuropathy continues to be his most bothersome complaint. He is not interested in further treatment.  We discussed his blood sugar is elevated at 120.  He may have borderline diabetes.  We discussed following up with his primary care provider, as well as trying to lose weight.       5. He does have atrial fibrillation and remains on Xarelto.  His heart is in a regular regular rhythm today.     Kadi Dee MD .

## 2023-05-01 NOTE — NURSING NOTE
"Oncology Rooming Note    May 1, 2023 1:27 PM   Reuben Padilla is a 62 year old male who presents for:    Chief Complaint   Patient presents with     Blood Draw     Port blood draw with heparin flush by lab RN. Vitals taken and appointment arrived     Oncology Clinic Visit     Esophageal cancer        Initial Vitals: /68   Pulse 89   Temp 97.8  F (36.6  C) (Oral)   Resp 16   Wt (!) 157.8 kg (347 lb 14.2 oz)   SpO2 97%   BMI 40.20 kg/m   Estimated body mass index is 40.2 kg/m  as calculated from the following:    Height as of 11/16/22: 1.981 m (6' 6\").    Weight as of this encounter: 157.8 kg (347 lb 14.2 oz). Body surface area is 2.95 meters squared.  No Pain (0) Comment: Data Unavailable   No LMP for male patient.  Allergies reviewed: Yes  Medications reviewed: Yes    Medications: Medication refills not needed today.  Pharmacy name entered into [employer]: [facility] - [facility], [address]    Clinical concerns: None.        Nati Thornton, DULCE            "

## 2023-05-01 NOTE — H&P (VIEW-ONLY)
HEMATOLOGY/ONCOLOGY PROGRESS NOTE  May 1, 2023    Care team: Dr Dee/Nicole Sutherland PA-C/Mansi Wetzel RN     REASON FOR VISIT: follow-up metastatic esophogeal cancer, received keytruda x 2 years, followed by concurrent chemo XRT (weekly carbo/taxol + XRT), now being monitored off all therapy. 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 His tumor is positive for cytokeratin 20, negative for P63 and CK7, and HER2 is negative. PDL1 + weakly.  He started on FOLFOX (5FU/oxaliplatin) on 5/15/2017. He had a delay in treatment from 9/5-10/2/17 due to work related injury.     He had an excellent response by imaging throughout the summer 2017.    He a mixed response by imaging in late fall 2017.    In January 2018, he was switched to taxol and cyramza - had slight progression and neuropathy and clinical trial became available.        In March 2018, he was started on the [facility] Match Clinical Trial with crizotinib.  (MET amplification) He remained on crizotinib from March - July 2018.     August 2018, he was switched back to taxol/cramza.  In October 2018, he was switched to Keytruda (PD1 overexpressor).              -April 2019, his infusion was held for three weeks, and he was treated with prednisone and Enbrel for grade 3 arthralgias. Keytruda was resumed              -May 2020 he started Actemra (5/14).  This was initially WEEKLY and then in August- moved to QOW (secondary to fatigue and concerns for low WBC).                -August 2020 moved to q6 week Keytruda (still at the 200 mg dose)              -November 2020 moved back to q3w Keytruda  CT 12/30/2020: progression of esophageal mass   1/14/21: PET showing growth of primary tumor only, discussion of radiation   Keytruda was discontinued at that time.  1/28/21: chemoradiation with weekly carbo/taxol and daily radiation.  Completed 3/3/21  received 5000 cGY     INTERVAL HISTORY:   Levi returns for follow up today. No new changes to his health.     He reports no dysphagia.  No n/v/d/c.    He has good energy.  His other complaints are that he has his neuropathy and this continues to bother him.  He tried a variety of therapies including duloxetine and laser therapy.  He is now on no therapy.     He remains on Xarelto.       He is going to move back to [place], and has been busy hauling.     ROS: 10 point ROS neg other than the symptoms noted above in the HPI.     PHYSICAL EXAMINATION  Wt Readings from Last 4 Encounters:   05/01/23 (!) 157.8 kg (347 lb 14.2 oz)   11/21/22 (!) 153.4 kg (338 lb 1.6 oz)   11/16/22 (!) 150.6 kg (332 lb)   10/06/22 (!) 155 kg (341 lb 12.8 oz)     /68   Pulse 89   Temp 97.8  F (36.6  C) (Oral)   Resp 16   Wt (!) 157.8 kg (347 lb 14.2 oz)   SpO2 97%   BMI 40.20 kg/m    Vital signs were reviewed.   Patient alert and oriented x3.   PERRLA. EOMI. No scleral icterus noted.   Neck exam: No palpable cervical, supraclavicular or axillary nodes bilaterally.   Heart:Mildly tachy, no murmur   Lungs: clear to auscultation bilaterally.  No crackles or wheezing.   Abd: positive bowel sounds in all four quadrants.  No tenderness to palpation.  No hepatomegaly.     Extremities: No lower extremity edema,  Neuro: grossly intact. Absent reflexes from knees to below.  Mood and affect is stable.       Labs:      Labs pending     EGD 10.5.22  Findings:        Moderate erythema was found in the lower third of the esophagus. No        definitive malignancy or stricture, however detailed exm not performed        due to food in stomach.        A large amount of food (residue) was found in the entire examined        stomach. Pylorus was intubated, no evidence for gastric outlet        obstruction.        The examined duodenum was normal.                                                                                     Impression:                - Erythema in the lower third of the esophagus.                             - A large amount of food (residue) in the stomach.                             - Normal examined duodenum.                             - No specimens collected.   Recommendation:           - Repeat upper endoscopy at appointment to be                             scheduled. 48 hours of clear liquid prior to                             procedure.     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 Biopsies were        taken with a cold forceps for histology.        Diffuse mildly erythematous mucosa was found in the stomach. No        worrisome lesions were observed.        The examined duodenum was normal.                                                                                     Impression:               EGD performed because of mild dysphagia in a                             patient with metastatic esophageal cancer that has                             been treated with systemic and radiation therapy.                             No obstruction or overt maligancy was noted. The                             mucosa near the EG junction suggests Baron's                             esophagus, but biopsies were taken to rule out                             recurrent malignancy. A significant stricture was                             not noted.      Lab Results   Component Value Date    WBC 5.5 05/01/2023    WBC 3.4 04/26/2021     Lab Results   Component Value Date    RBC 4.69 05/01/2023    RBC 4.56 04/26/2021     Lab Results   Component Value Date    HGB 13.8 05/01/2023    HGB 13.6 04/26/2021     Lab Results   Component Value Date    HCT 41.0 05/01/2023    HCT 41.5 04/26/2021     No components found for: MCT  Lab Results   Component Value Date    MCV 87 05/01/2023    MCV 91 04/26/2021     Lab Results   Component Value Date    MCH 29.4 05/01/2023     MCH 29.8 04/26/2021     Lab Results   Component Value Date    MCHC 33.7 05/01/2023    MCHC 32.8 04/26/2021     Lab Results   Component Value Date    RDW 13.4 05/01/2023    RDW 12.7 04/26/2021     Lab Results   Component Value Date     05/01/2023     04/26/2021       Recent Labs   Lab Test 05/01/23  0939 10/06/22  0832    138   POTASSIUM 3.9 3.9   CHLORIDE 106 104   CO2 25 24   ANIONGAP 10 10   * 118*   BUN 23.3* 24.8*   CR 1.06 1.16   NIDHI 9.2 9.3     Liver Function Studies - Recent Labs   Lab Test 05/01/23 0939   PROTTOTAL 7.4   ALBUMIN 3.9   BILITOTAL 0.7   ALKPHOS 95   AST 19   ALT 12     CT CAP - no evidence of recurrent or metastatic disease      IMPRESSION/PLAN:  1. 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 He had stable disease on Keytruda, for TWO years,  and then s/p chemoradiation to the primary tumor.    He has been off all therapy since 2/2021. I reviewed his CT CAP scan with him and advised that we continue to monitor him off of therapy for now.   I am thrilled with how he is doing.    2. Dysphagia -  He does appear to have gastroparesis.  Today this is not bothering him.  He had a repeat endoscopy last fall. The pathology revealed inflammation.  Overall appearance of the endoscopy was suggestive of Barretts esophagus.    We discussed continuing omeprazole.  He is now off of reglan.    3. He has a history of pulmonary embolus from 2018 and remains on Xarelto.       4. Neuropathy continues to be his most bothersome complaint. He is not interested in further treatment.  We discussed his blood sugar is elevated at 120.  He may have borderline diabetes.  We discussed following up with his primary care provider, as well as trying to lose weight.       5. He does have atrial fibrillation and remains on Xarelto.  His heart is in a regular regular rhythm today.     Kadi Dee MD .

## 2023-05-01 NOTE — NURSING NOTE
Chief Complaint   Patient presents with     Blood Draw     Port blood draw with heparin flush by lab RN. Vitals taken and appointment arrived     Trinh Paiz RN

## 2023-05-01 NOTE — LETTER
5/1/2023         RE: Reuben Padilla  [address] [facility] [address]        Dear Colleague,    Thank you for referring your patient, Reuben Padilla, to the [facility]. Please see a copy of my visit note below.        HEMATOLOGY/ONCOLOGY PROGRESS NOTE  May 1, 2023    Care team: Dr Dee/Nicole Sutherland PA-C/Mansi Wetzel RN     REASON FOR VISIT: follow-up metastatic esophogeal cancer, received keytruda x 2 years, followed by concurrent chemo XRT (weekly carbo/taxol + XRT), now being monitored off all therapy. 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 His tumor is positive for cytokeratin 20, negative for P63 and CK7, and HER2 is negative. PDL1 + weakly.  He started on FOLFOX (5FU/oxaliplatin) on 5/15/2017. He had a delay in treatment from 9/5-10/2/17 due to work related injury.     He had an excellent response by imaging throughout the summer 2017.    He a mixed response by imaging in late fall 2017.    In January 2018, he was switched to taxol and cyramza - had slight progression and neuropathy and clinical trial became available.        In March 2018, he was started on the [facility] Match Clinical Trial with crizotinib.  (MET amplification) He remained on crizotinib from March - July 2018.     August 2018, he was switched back to taxol/cramza.  In October 2018, he was switched to Keytruda (PD1 overexpressor).              -April 2019, his infusion was held for three weeks, and he was treated with prednisone and Enbrel for grade 3 arthralgias. Keytruda was resumed              -May 2020 he started Actemra (5/14).  This was initially WEEKLY and then in August- moved to QOW (secondary to fatigue and concerns for low WBC).                -August 2020 moved to q6 week Keytruda (still at the 200 mg dose)              -November 2020 moved back to  q3w Keytruda  CT 12/30/2020: progression of esophageal mass   1/14/21: PET showing growth of primary tumor only, discussion of radiation   Keytruda was discontinued at that time.  1/28/21: chemoradiation with weekly carbo/taxol and daily radiation.  Completed 3/3/21 received 5000 cGY     INTERVAL HISTORY:   Levi returns for follow up today. No new changes to his health.     He reports no dysphagia.  No n/v/d/c.    He has good energy.  His other complaints are that he has his neuropathy and this continues to bother him.  He tried a variety of therapies including duloxetine and laser therapy.  He is now on no therapy.     He remains on Xarelto.       He is going to move back to [place], and has been busy hauling.     ROS: 10 point ROS neg other than the symptoms noted above in the HPI.     PHYSICAL EXAMINATION  Wt Readings from Last 4 Encounters:   05/01/23 (!) 157.8 kg (347 lb 14.2 oz)   11/21/22 (!) 153.4 kg (338 lb 1.6 oz)   11/16/22 (!) 150.6 kg (332 lb)   10/06/22 (!) 155 kg (341 lb 12.8 oz)     /68   Pulse 89   Temp 97.8  F (36.6  C) (Oral)   Resp 16   Wt (!) 157.8 kg (347 lb 14.2 oz)   SpO2 97%   BMI 40.20 kg/m    Vital signs were reviewed.   Patient alert and oriented x3.   PERRLA. EOMI. No scleral icterus noted.   Neck exam: No palpable cervical, supraclavicular or axillary nodes bilaterally.   Heart:Mildly tachy, no murmur   Lungs: clear to auscultation bilaterally.  No crackles or wheezing.   Abd: positive bowel sounds in all four quadrants.  No tenderness to palpation.  No hepatomegaly.     Extremities: No lower extremity edema,  Neuro: grossly intact. Absent reflexes from knees to below.  Mood and affect is stable.       Labs:      Labs pending     EGD 10.5.22  Findings:        Moderate erythema was found in the lower third of the esophagus. No        definitive malignancy or stricture, however detailed exm not performed        due to food in stomach.        A large amount of food (residue) was  found in the entire examined        stomach. Pylorus was intubated, no evidence for gastric outlet        obstruction.        The examined duodenum was normal.                                                                                     Impression:               - Erythema in the lower third of the esophagus.                             - A large amount of food (residue) in the stomach.                             - Normal examined duodenum.                             - No specimens collected.   Recommendation:           - Repeat upper endoscopy at appointment to be                             scheduled. 48 hours of clear liquid prior to                             procedure.     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 Biopsies were        taken with a cold forceps for histology.        Diffuse mildly erythematous mucosa was found in the stomach. No        worrisome lesions were observed.        The examined duodenum was normal.                                                                                     Impression:               EGD performed because of mild dysphagia in a                             patient with metastatic esophageal cancer that has                             been treated with systemic and radiation therapy.                             No obstruction or overt maligancy was noted. The                             mucosa near the EG junction suggests Baron's                             esophagus, but biopsies were taken to rule out                             recurrent malignancy. A significant stricture was                             not noted.      Lab Results   Component Value Date    WBC 5.5 05/01/2023    WBC 3.4 04/26/2021     Lab Results   Component Value Date    RBC 4.69 05/01/2023    RBC 4.56 04/26/2021     Lab Results   Component Value Date    HGB 13.8 05/01/2023    HGB 13.6  04/26/2021     Lab Results   Component Value Date    HCT 41.0 05/01/2023    HCT 41.5 04/26/2021     No components found for: MCT  Lab Results   Component Value Date    MCV 87 05/01/2023    MCV 91 04/26/2021     Lab Results   Component Value Date    MCH 29.4 05/01/2023    MCH 29.8 04/26/2021     Lab Results   Component Value Date    MCHC 33.7 05/01/2023    MCHC 32.8 04/26/2021     Lab Results   Component Value Date    RDW 13.4 05/01/2023    RDW 12.7 04/26/2021     Lab Results   Component Value Date     05/01/2023     04/26/2021       Recent Labs   Lab Test 05/01/23  0939 10/06/22  0832    138   POTASSIUM 3.9 3.9   CHLORIDE 106 104   CO2 25 24   ANIONGAP 10 10   * 118*   BUN 23.3* 24.8*   CR 1.06 1.16   NIDHI 9.2 9.3     Liver Function Studies - Recent Labs   Lab Test 05/01/23 0939   PROTTOTAL 7.4   ALBUMIN 3.9   BILITOTAL 0.7   ALKPHOS 95   AST 19   ALT 12     CT CAP - no evidence of recurrent or metastatic disease      IMPRESSION/PLAN:  1. 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 He had stable disease on Keytruda, for TWO years,  and then s/p chemoradiation to the primary tumor.    He has been off all therapy since 2/2021. I reviewed his CT CAP scan with him and advised that we continue to monitor him off of therapy for now.   I am thrilled with how he is doing.    2. Dysphagia -  He does appear to have gastroparesis.  Today this is not bothering him.  He had a repeat endoscopy last fall. The pathology revealed inflammation.  Overall appearance of the endoscopy was suggestive of Barretts esophagus.    We discussed continuing omeprazole.  He is now off of reglan.    3. He has a history of pulmonary embolus from 2018 and remains on Xarelto.       4. Neuropathy continues to be his most bothersome complaint. He is not interested in further treatment.  We discussed his blood sugar is elevated at 120.  He may have borderline diabetes.  We discussed following up  with his primary care provider, as well as trying to lose weight.       5. He does have atrial fibrillation and remains on Xarelto.  His heart is in a regular regular rhythm today.     Kadi Dee MD .      Again, thank you for allowing me to participate in the care of your patient.        Sincerely,        Kadi Dee MD

## 2023-05-02 ENCOUNTER — TELEPHONE (OUTPATIENT)
Dept: GASTROENTEROLOGY | Facility: CLINIC | Age: 63
End: 2023-05-02
Payer: COMMERCIAL

## 2023-05-02 NOTE — TELEPHONE ENCOUNTER
Screening Questions  BLUE  KIND OF PREP RED  LOCATION [review exclusion criteria] GREEN  SEDATION TYPE        y Are you active on mychart?       [place]laes Ordering/Referring Provider?        BCBS What type of coverage do you have?      n Have you had a positive covid test in the last 14 days?     39.3 1. BMI  [BMI 40+ - review exclusion criteria& smart-phrase document]    y  2. Are you able to give consent for your medical care? [IF NO,RN REVIEW]          n  3. Are you taking any prescription pain medications on a routine schedule   (ex narcotics: oxycodone, roxicodone, oxycontin,  and percocet)? [RN Review]        n  3a. EXTENDED PREP What kind of prescription?     n 4. Do you have any chemical dependencies such as alcohol, street drugs, or methadone?        **If yes 3- 5 , please schedule with MAC sedation.**          IF YES TO ANY 6 - 10 - HOSPITAL SETTING ONLY.     n 6.   Do you need assistance transferring?     n 7.   Have you had a heart or lung transplant?    n 8.   Are you currently on dialysis?   n 9.   Do you use daily home oxygen?   n 10. Do you take nitroglycerin?   10a. n If yes, how often?     11. [FEMALES]   Are you currently pregnant?    11a.  If yes, how many weeks? [ Greater than 12 weeks, OR NEEDED]    n 12. Do you have Pulmonary Hypertension? *NEED PAC APPT AT [facility] w/ MAC*     n 13. [review exclusion criteria]  Do you have any implantable devices in your body (pacemaker, defib, LVAD)?    n 14. In the past 6 months, have you had any heart related issues including cardiomyopathy or heart attack?     14a. n If yes, did it require cardiac stenting if so when?     n 15. Have you had a stroke or Transient ischemic attack (TIA - aka  mini stroke ) within 6 months?      n 16. Do you have mod to severe Obstructive Sleep Apnea?  [Hospital only]    n 17. Do you have SEVERE AND UNCONTROLLED asthma? *NEED PAC APPT AT [facility] w/MAC*     18. Are you currently taking any blood thinners?     18a. No. Continue to  "19.   18b. Xarelto    n 19. Do you take the medication Phentermine?    19a. If yes, \"Hold for 7 days before procedure.  Please consult your prescribing provider if you have questions about holding this medication.\"     n  20. Do you have chronic kidney disease?      n  21. Do you have a diagnosis of diabetes?     n  22. On a regular basis do you go 3-5 days between bowel movements?      23. Preferred LOCAL Pharmacy for Pre Prescription    [ LIST ONLY ONE PHARMACY]             - CLOSING REMINDERS -    Informed patient they will need an adult    Cannot take any type of public or medical transportation alone    Conscious Sedation- Needs  for 6 hours after the procedure       MAC/General-Needs  for 24 hours after procedure    Pre-Procedure Covid test to be completed [[facility] PCR Testing Required]    Confirmed Nurse will call to complete assessment       - SCHEDULING DETAILS -  n Hospital Setting Required? If yes, what is the exclusion?:    [place]  Surgeon    7/13  Date of Procedure  Lower Endoscopy [Colonoscopy]  Type of Procedure Scheduled  [facility] Location   MIRALAX GATORADE WITHOUT MAGNEISUM CITRATE Which Colonoscopy Prep was Sent?     CS Sedation Type     n PAC / Pre-op Required                 "

## 2023-05-25 ENCOUNTER — ANESTHESIA EVENT (OUTPATIENT)
Dept: SURGERY | Facility: AMBULATORY SURGERY CENTER | Age: 63
End: 2023-05-25
Payer: COMMERCIAL

## 2023-05-25 RX ORDER — OXYCODONE HYDROCHLORIDE 5 MG/1
5 TABLET ORAL
Status: CANCELLED | OUTPATIENT
Start: 2023-05-25

## 2023-05-25 RX ORDER — OXYCODONE HYDROCHLORIDE 5 MG/1
10 TABLET ORAL
Status: CANCELLED | OUTPATIENT
Start: 2023-05-25

## 2023-05-25 RX ORDER — ONDANSETRON 4 MG/1
4 TABLET, ORALLY DISINTEGRATING ORAL EVERY 30 MIN PRN
Status: CANCELLED | OUTPATIENT
Start: 2023-05-25

## 2023-05-25 RX ORDER — ONDANSETRON 2 MG/ML
4 INJECTION INTRAMUSCULAR; INTRAVENOUS EVERY 30 MIN PRN
Status: CANCELLED | OUTPATIENT
Start: 2023-05-25

## 2023-05-26 ENCOUNTER — ANCILLARY PROCEDURE (OUTPATIENT)
Dept: RADIOLOGY | Facility: AMBULATORY SURGERY CENTER | Age: 63
End: 2023-05-26
Attending: INTERNAL MEDICINE
Payer: COMMERCIAL

## 2023-05-26 ENCOUNTER — HOSPITAL ENCOUNTER (OUTPATIENT)
Facility: AMBULATORY SURGERY CENTER | Age: 63
Discharge: HOME | End: 2023-05-26
Attending: RADIOLOGY
Payer: COMMERCIAL

## 2023-05-26 ENCOUNTER — ANESTHESIA (OUTPATIENT)
Dept: SURGERY | Facility: AMBULATORY SURGERY CENTER | Age: 63
End: 2023-05-26
Payer: COMMERCIAL

## 2023-05-26 VITALS
BODY MASS INDEX: 36.45 KG/M2 | WEIGHT: 315 LBS | SYSTOLIC BLOOD PRESSURE: 117 MMHG | DIASTOLIC BLOOD PRESSURE: 70 MMHG | HEIGHT: 78 IN | HEART RATE: 78 BPM | RESPIRATION RATE: 16 BRPM | TEMPERATURE: 97 F | OXYGEN SATURATION: 100 %

## 2023-05-26 DIAGNOSIS — C16.9 METASTASIS FROM GASTRIC CANCER (H): ICD-10-CM

## 2023-05-26 DIAGNOSIS — C79.9 METASTASIS FROM GASTRIC CANCER (H): ICD-10-CM

## 2023-05-26 PROCEDURE — 36590 REMOVAL TUNNELED CV CATH: CPT | Performed by: RADIOLOGY

## 2023-05-26 PROCEDURE — 36590 REMOVAL TUNNELED CV CATH: CPT

## 2023-05-26 RX ORDER — PROPOFOL 10 MG/ML
INJECTION, EMULSION INTRAVENOUS CONTINUOUS PRN
Status: DISCONTINUED | OUTPATIENT
Start: 2023-05-26 | End: 2023-05-26

## 2023-05-26 RX ORDER — PROPOFOL 10 MG/ML
INJECTION, EMULSION INTRAVENOUS PRN
Status: DISCONTINUED | OUTPATIENT
Start: 2023-05-26 | End: 2023-05-26

## 2023-05-26 RX ORDER — LIDOCAINE HYDROCHLORIDE 10 MG/ML
INJECTION, SOLUTION EPIDURAL; INFILTRATION; INTRACAUDAL; PERINEURAL DAILY PRN
Status: DISCONTINUED | OUTPATIENT
Start: 2023-05-26 | End: 2023-05-26 | Stop reason: HOSPADM

## 2023-05-26 RX ORDER — SODIUM CHLORIDE, SODIUM LACTATE, POTASSIUM CHLORIDE, CALCIUM CHLORIDE 600; 310; 30; 20 MG/100ML; MG/100ML; MG/100ML; MG/100ML
INJECTION, SOLUTION INTRAVENOUS CONTINUOUS
Status: DISCONTINUED | OUTPATIENT
Start: 2023-05-26 | End: 2023-05-27 | Stop reason: HOSPADM

## 2023-05-26 RX ORDER — FENTANYL CITRATE 50 UG/ML
25 INJECTION, SOLUTION INTRAMUSCULAR; INTRAVENOUS EVERY 5 MIN PRN
Status: DISCONTINUED | OUTPATIENT
Start: 2023-05-26 | End: 2023-05-27 | Stop reason: HOSPADM

## 2023-05-26 RX ORDER — HYDROMORPHONE HYDROCHLORIDE 1 MG/ML
0.4 INJECTION, SOLUTION INTRAMUSCULAR; INTRAVENOUS; SUBCUTANEOUS EVERY 5 MIN PRN
Status: DISCONTINUED | OUTPATIENT
Start: 2023-05-26 | End: 2023-05-27 | Stop reason: HOSPADM

## 2023-05-26 RX ORDER — ONDANSETRON 2 MG/ML
4 INJECTION INTRAMUSCULAR; INTRAVENOUS EVERY 30 MIN PRN
Status: DISCONTINUED | OUTPATIENT
Start: 2023-05-26 | End: 2023-05-27 | Stop reason: HOSPADM

## 2023-05-26 RX ORDER — ACETAMINOPHEN 325 MG/1
975 TABLET ORAL ONCE
Status: DISCONTINUED | OUTPATIENT
Start: 2023-05-26 | End: 2023-05-27 | Stop reason: HOSPADM

## 2023-05-26 RX ORDER — FENTANYL CITRATE 50 UG/ML
50 INJECTION, SOLUTION INTRAMUSCULAR; INTRAVENOUS EVERY 5 MIN PRN
Status: DISCONTINUED | OUTPATIENT
Start: 2023-05-26 | End: 2023-05-27 | Stop reason: HOSPADM

## 2023-05-26 RX ORDER — ONDANSETRON 4 MG/1
4 TABLET, ORALLY DISINTEGRATING ORAL EVERY 30 MIN PRN
Status: DISCONTINUED | OUTPATIENT
Start: 2023-05-26 | End: 2023-05-27 | Stop reason: HOSPADM

## 2023-05-26 RX ORDER — HYDROMORPHONE HYDROCHLORIDE 1 MG/ML
0.2 INJECTION, SOLUTION INTRAMUSCULAR; INTRAVENOUS; SUBCUTANEOUS EVERY 5 MIN PRN
Status: DISCONTINUED | OUTPATIENT
Start: 2023-05-26 | End: 2023-05-27 | Stop reason: HOSPADM

## 2023-05-26 RX ORDER — LIDOCAINE HYDROCHLORIDE 20 MG/ML
INJECTION, SOLUTION INFILTRATION; PERINEURAL PRN
Status: DISCONTINUED | OUTPATIENT
Start: 2023-05-26 | End: 2023-05-26

## 2023-05-26 RX ORDER — LIDOCAINE 40 MG/G
CREAM TOPICAL
Status: DISCONTINUED | OUTPATIENT
Start: 2023-05-26 | End: 2023-05-27 | Stop reason: HOSPADM

## 2023-05-26 RX ADMIN — PROPOFOL 100 MG: 10 INJECTION, EMULSION INTRAVENOUS at 09:58

## 2023-05-26 RX ADMIN — PROPOFOL 100 MCG/KG/MIN: 10 INJECTION, EMULSION INTRAVENOUS at 09:58

## 2023-05-26 RX ADMIN — SODIUM CHLORIDE, SODIUM LACTATE, POTASSIUM CHLORIDE, CALCIUM CHLORIDE: 600; 310; 30; 20 INJECTION, SOLUTION INTRAVENOUS at 09:51

## 2023-05-26 RX ADMIN — LIDOCAINE HYDROCHLORIDE 60 MG: 20 INJECTION, SOLUTION INFILTRATION; PERINEURAL at 09:58

## 2023-05-26 ASSESSMENT — LIFESTYLE VARIABLES: TOBACCO_USE: 1

## 2023-05-26 ASSESSMENT — ENCOUNTER SYMPTOMS: DYSRHYTHMIAS: 1

## 2023-05-26 NOTE — INTERVAL H&P NOTE
"I have reviewed the surgical (or preoperative) H&P that is linked to this encounter, and examined the patient. There are no significant changes    Clinical Conditions Present on Arrival:  Clinically Significant Risk Factors Present on Admission                  # Obesity: Estimated body mass index is 39.29 kg/m  as calculated from the following:    Height as of this encounter: 1.981 m (6' 6\").    Weight as of this encounter: 154.2 kg (340 lb).       "

## 2023-05-26 NOTE — DISCHARGE INSTRUCTIONS
A collaboration between [facility] and [facility]  Experts in minimally invasive, targeted treatments performed using imaging guidance    Venous Access Device, Port Catheter or Tunneled Central Line Removal    Today you had your existing venous access device removed, either because it was no longer needed or because there was malfunction or infection issues.    After you go home:  Drink plenty of fluids.  Generally 6-8 (8 ounce) glasses a day is recommended.  Resume your regular diet unless otherwise ordered by a medical provider.  Keep any applied tape/gauze dressings clean and dry.  Change tape/gauze dressings if they get wet or soiled.  You may shower the following day after procedure, however cover and protect from moisture any tape/gauze dressings.  You may let water hit and run over dried skin glue, but do not scrub.  Pat the area dry after showering.  Port removal incisions are closed with absorbable suture, meaning they do not need to be removed at a later date, and a topical skin adhesive (skin glue).  This glue will wear off in 7-14 days.  Do not remove before this time.  If 14 days have passed and residual glue is present, you may gently remove it.  You may remove tape/gauze dressings after 5 days if the site looks closed and in the process of healing.  Do not apply gels, lotions, or ointments to the glue site for the first 10 days as this may cause the glue to prematurely soften and fail.  Do not perform strenuous activities or lift greater than 10 pounds for the next three days.  If there is bleeding or oozing from the procedure site, apply firm pressure to the area for 5-10 minutes.  If the bleeding continues seek medical advice at the numbers below.  Mild procedure site discomfort can be treated with an ice pack and over-the-counter pain relievers.              For 24 hours after any sedation used:  Relax and take it easy.  No strenuous  activities.  Do not drive or operate machines at home or at work.  No alcohol consumption.  Do not make any important or legal decisions.    Call our Interventional Radiology (IR) service if:  If you start bleeding from the procedure site.  If you do start to bleed from the site, lie down and hold some pressure on the site.  Our radiology provider can help you decide if you need to return to the hospital.  If you have new or worsening pain related to the procedure.  If you have concerning swelling at the procedure site.  If you develop persistent nausea or vomiting.  If you develop hives or a rash or any unexplained itching.  If you have a fever of greater than 100.5  F and chills in the first 5 days after procedure.  Any other concerns related to your procedure.      [facility]  Interventional Radiology (IR)  [address]  [address] [facility] [place] [address]    Contact Number:  [phone]  (IR control desk)  Monday - Friday 8:00 am - 4:30 pm    After hours for urgent concerns:  [phone]  After 4:30 pm Monday - Friday, Weekends and Holidays.   Ask for Interventional Radiology on-call.  Someone is available 24 hours a day.  [facility] toll free number:  [phone]

## 2023-05-26 NOTE — ANESTHESIA PREPROCEDURE EVALUATION
Anesthesia Pre-Procedure Evaluation    Patient: Reuben Padilla   MRN: [id number] : 1960        Procedure : Procedure(s):  Remove port vascular access          Past Medical History:   Diagnosis Date     Arrhythmia      Arthritis 2018     Cancer (H)     esophageal     Coronary artery disease 2016    a fib     Glaucoma      Hypertension      Meniere's disease 1999    haven't had issues in yrs with this     Paroxysmal A-fib (H)      Reduced vision 2008    glaucoma     Tubular adenoma 2017      Past Surgical History:   Procedure Laterality Date     AMPUTATION      toe     ARTHROSCOPY KNEE       bunion surgery       CHOLECYSTECTOMY       COLONOSCOPY  2017    remove 2 polyps     ESOPHAGOSCOPY, GASTROSCOPY, DUODENOSCOPY (EGD), COMBINED N/A 10/10/2018    Procedure: COMBINED ESOPHAGOSCOPY, GASTROSCOPY, DUODENOSCOPY (EGD);  Esophagogastroduodenoscopy ;  Surgeon: Leonel Joel MD;  Location: [facility] OR     ESOPHAGOSCOPY, GASTROSCOPY, DUODENOSCOPY (EGD), COMBINED N/A 2021    Procedure: ESOPHAGOGASTRODUODENOSCOPY, WITH BIOPSY;  Surgeon: Sadia Reed MD;  [facility] [facility] [place]     ESOPHAGOSCOPY, GASTROSCOPY, DUODENOSCOPY (EGD), COMBINED N/A 11/10/2021    Procedure: ESOPHAGOGASTRODUODENOSCOPY, WITH BIOPSY;  Surgeon: Leventhal, Thomas Michael, MD;  Location: [facility] OR     ESOPHAGOSCOPY, GASTROSCOPY, DUODENOSCOPY (EGD), COMBINED N/A 10/5/2022    Procedure: ESOPHAGOGASTRODUODENOSCOPY (EGD);  Surgeon: Arnold Guzman MD;  Location:  GI     ESOPHAGOSCOPY, GASTROSCOPY, DUODENOSCOPY (EGD), COMBINED N/A 2022    Procedure: ESOPHAGOGASTRODUODENOSCOPY, WITH BIOPSY;  Surgeon: Dao Bhakta MD;  Location:  GI     INSERT PORT VASCULAR ACCESS Right 2017    Procedure: INSERT PORT VASCULAR ACCESS;  Single Lumen Chest Power Port;  Surgeon: Jasiel Hu PA-C;  [facility]: [facility]      Allergies   Allergen Reactions     Penicillin G Sodium Unknown     Penicillin G       Social History      Tobacco Use     Smoking status: Former     Packs/day: 1.00     Years: 20.00     Pack years: 20.00     Types: Cigarettes     Start date: 1978     Quit date: 2006     Years since quittin.0     Smokeless tobacco: Never   Vaping Use     Vaping status: Not on file   Substance Use Topics     Alcohol use: Yes     Comment: one drink a week      Wt Readings from Last 1 Encounters:   23 (!) 154.2 kg (340 lb)        Anesthesia Evaluation            ROS/MED HX  ENT/Pulmonary:     (+) tobacco use, Current use,     Neurologic:       Cardiovascular:     (+) hypertension--CAD ---dysrhythmias, a-fib,     METS/Exercise Tolerance:     Hematologic:     (+) History of blood clots, pt is anticoagulated,     Musculoskeletal:   (+) arthritis,     GI/Hepatic:     (+) liver disease,     Renal/Genitourinary:       Endo:     (+) Obesity,     Psychiatric/Substance Use:       Infectious Disease:       Malignancy:       Other:            Physical Exam    Airway  airway exam normal      Mallampati: II   TM distance: > 3 FB   Neck ROM: full   Mouth opening: > 3 cm    Respiratory Devices and Support         Dental       (+) Completely normal teeth      Cardiovascular          Rhythm and rate: regular and normal     Pulmonary   pulmonary exam normal        breath sounds clear to auscultation           OUTSIDE LABS:  CBC:   Lab Results   Component Value Date    WBC 5.5 2023    WBC 3.8 (L) 10/06/2022    HGB 13.8 2023    HGB 15.0 10/06/2022    HCT 41.0 2023    HCT 43.3 10/06/2022     2023     10/06/2022     BMP:   Lab Results   Component Value Date     2023     10/06/2022    POTASSIUM 3.9 2023    POTASSIUM 3.9 10/06/2022    CHLORIDE 106 2023    CHLORIDE 104 10/06/2022    CO2 25 2023    CO2 24 10/06/2022    BUN 23.3 (H) 2023    BUN 24.8 (H) 10/06/2022    CR 1.06 2023    CR 1.16 10/06/2022     (H) 2023     (H) 10/06/2022      COAGS:   Lab Results   Component Value Date    PTT 36 11/02/2018    INR 1.38 (H) 12/31/2021    FIBR 288 11/02/2018     POC:   Lab Results   Component Value Date     (H) 11/07/2018     HEPATIC:   Lab Results   Component Value Date    ALBUMIN 3.9 05/01/2023    PROTTOTAL 7.4 05/01/2023    ALT 12 05/01/2023    AST 19 05/01/2023    ALKPHOS 95 05/01/2023    BILITOTAL 0.7 05/01/2023     OTHER:   Lab Results   Component Value Date    NIDHI 9.2 05/01/2023    PHOS 3.3 11/07/2018    MAG 1.9 11/07/2018    LIPASE 75 11/01/2018    TSH 1.63 05/01/2023    T4 0.88 07/20/2020    CRP <2.9 09/03/2019    SED 14 05/29/2019       Anesthesia Plan    ASA Status:  3   NPO Status:  NPO Appropriate    Anesthesia Type: MAC.     - Reason for MAC: immobility needed, straight local not clinically adequate   Induction: Intravenous.   Maintenance: TIVA.        Consents    Anesthesia Plan(s) and associated risks, benefits, and realistic alternatives discussed. Questions answered and patient/representative(s) expressed understanding.    - Discussed:     - Discussed with:  Patient      - Extended Intubation/Ventilatory Support Discussed: No.      - Patient is DNR/DNI Status: No    Use of blood products discussed: No .     Postoperative Care    Pain management: IV analgesics, Oral pain medications, Multi-modal analgesia.   PONV prophylaxis: Ondansetron (or other 5HT-3), Background Propofol Infusion     Comments:                Kelton Gauthier MD

## 2023-05-26 NOTE — BRIEF OP NOTE
[facility]    Brief Operative Note    Pre-operative diagnosis: Malignant neoplasm of stomach, unspecified location (H) [C16.9]  Post-operative diagnosis Same as pre-operative diagnosis    Procedure: Procedure(s):  Remove port vascular access  Surgeon: Surgeon(s) and Role:     * Gonzalez Aggarwal MD - Primary  Anesthesia: MAC   Estimated Blood Loss: None    Drains: None  Specimens: * No specimens in log *  Findings:   None.  Complications: None.  Implants:   Implant Name Type Inv. Item Serial No.  Lot No. LRB No. Used Action   CATH PORT POWERPORT CLEARVUE SLIM [id number] Port CATH PORT POWERPORT CLEARVUE SLIM [id number]  [employer]  Right 1 Explanted

## 2023-05-26 NOTE — OP NOTE
PRE-OPERATIVE DIAGNOSIS:  Metastases gastric cancer    POST-OPERATIVE DIAGNOSIS:  Same      PROCEDURE:  Chest port removal    FINAL IMPRESSION:  Port reservoir and catheter removed in their entirety.  No immediate complications.     ----------    CLINICAL HISTORY:  Port no longer needed; port removal requested.     : SURAJ Farris, JOHANNA AHUMADA MD, attest that I was present in the procedure room for the entire procedure.    CONSENT:  The patient understood the limitations, alternatives, and risks of the procedure and agreed to the procedure.  Written informed consent was obtained and is documented in the patient record.      MEDICATIONS: Procedure was performed under monitored anesthesia care. For details please refer to anesthesiology note.        DESCRIPTION:  After infiltration of local anesthesia to the skin overlying the port reservoir, an incision was made using a #15 blade.  Using a combination of dissection and traction, the catheter was isolated and removed with pressure held over the venipuncture site to obtain hemostasis.  Further dissection was used to allow the removal of the port reservoir itself.  The reservoir pocket was irrigated with normal saline saline solution and then packed with gauze to obtain hemostasis.  The gauze was then removed.  The chest incision was closed with three 3-0 Vicryl interrupted sutures, a running 4-0 Monocryl subcuticular suture, and Dermabond.    COMPLICATIONS:  No immediate concerns; the patient remained stable throughout the procedure and tolerated it well.    ESTIMATED BLOOD LOSS:  Minimal    SPECIMENS:  Port and cathter removed per above

## 2023-05-26 NOTE — ANESTHESIA POSTPROCEDURE EVALUATION
Patient: Reuben Padilla    Procedure: Procedure(s):  Remove port vascular access       Anesthesia Type:  MAC    Note:  Disposition: Outpatient   Postop Pain Control: Uneventful            Sign Out: Well controlled pain   PONV: No   Neuro/Psych: Uneventful            Sign Out: Acceptable/Baseline neuro status   Airway/Respiratory: Uneventful            Sign Out: Acceptable/Baseline resp. status   CV/Hemodynamics: Uneventful            Sign Out: Acceptable CV status   Other NRE: NONE   DID A NON-ROUTINE EVENT OCCUR? No           Last vitals:  Vitals Value Taken Time   /70 05/26/23 1120   Temp 36.1  C (97  F) 05/26/23 1120   Pulse     Resp 16 05/26/23 1120   SpO2 100 % 05/26/23 1120       Electronically Signed By: Kelton Gauthier MD  May 26, 2023  3:07 PM

## 2023-05-26 NOTE — ANESTHESIA CARE TRANSFER NOTE
Patient: Reuben Padilla    Procedure: Procedure(s):  Remove port vascular access       Diagnosis: Malignant neoplasm of stomach, unspecified location (H) [C16.9]  Diagnosis Additional Information: No value filed.    Anesthesia Type:   MAC     Note:    Oropharynx: oropharynx clear of all foreign objects  Level of Consciousness: awake  Oxygen Supplementation: room air    Independent Airway: airway patency satisfactory and stable  Dentition: dentition unchanged  Vital Signs Stable: post-procedure vital signs reviewed and stable  Report to RN Given: handoff report given  Patient transferred to: PACU    Handoff Report: Identifed the Patient, Identified the Reponsible Provider, Reviewed the pertinent medical history, Discussed the surgical course, Reviewed Intra-OP anesthesia mangement and issues during anesthesia, Set expectations for post-procedure period and Allowed opportunity for questions and acknowledgement of understanding      Vitals:  Vitals Value Taken Time   /81 05/26/23 1040   Temp 36  C (96.8  F) 05/26/23 1040   Pulse     Resp 16 05/26/23 1040   SpO2 95 % 05/26/23 1040       Electronically Signed By: PADMA Lee CRNA  May 26, 2023  10:46 AM

## 2023-06-16 RX ORDER — LIDOCAINE 40 MG/G
CREAM TOPICAL
Status: CANCELLED | OUTPATIENT
Start: 2023-06-16

## 2023-06-16 RX ORDER — ONDANSETRON 2 MG/ML
4 INJECTION INTRAMUSCULAR; INTRAVENOUS
Status: CANCELLED | OUTPATIENT
Start: 2023-06-16

## 2023-06-17 ENCOUNTER — HEALTH MAINTENANCE LETTER (OUTPATIENT)
Age: 63
End: 2023-06-17

## 2023-06-27 ENCOUNTER — TELEPHONE (OUTPATIENT)
Dept: GASTROENTEROLOGY | Facility: CLINIC | Age: 63
End: 2023-06-27
Payer: COMMERCIAL

## 2023-06-27 NOTE — TELEPHONE ENCOUNTER
Caller: Reuben Padilla   Reason for Reschedule/Cancellation (please be detailed, any staff messages or encounters to note?):     PROVIDER IS KATHRYN/MIKEY       Prior to reschedule please review:    Ordering Provider:KHOI     Sedation per order:MODERATE     Does patient have any ASC Exclusions, please identify?: N       Notes on Cancelled Procedure:  1. Procedure:Lower Endoscopy [Colonoscopy]   2. Date: 07/13  3. [facility] [address] [place] [address]  4. Surgeon: MIKEY         Rescheduled: YES     Procedure: Lower Endoscopy [Colonoscopy]    Date: 09/20/2023    [facility] [address] [place] [address]    Surgeon: TESS     Sedation Level Scheduled  MODERATE          Reason for Sedation Level PER ORDER     Prep/Instructions updated and sent: [employer]        Send In - basket message to Panc - Leandro Pool if EUS  procedure is canceled or rescheduled: [ N/A, YES or NO] N/A

## 2023-07-20 NOTE — LETTER
1/28/2021         RE: Reuben Padilla  [address] [place] [id number]        Dear Colleague,    Thank you for referring your patient, Reuben Padilla, to the [facility]. Please see a copy of my visit note below.    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 His tumor is positive for cytokeratin 20, negative for P63 and CK7, and HER2 is negative. He started on FOLFOX (5FU/oxaliplatin) on 5/15/2017. He had a delay in treatment from 9/5-10/2/17 due to work related injury.     He had an excellent response by imaging throughout the summer 2017.    He a mixed response by imaging in late fall 2017.    In January 2018, he was switched to taxol and cyramza - had slight progression and neuropathy and clinical trial became available.        In March 2018, he was started on the [facility] Match Clinical Trial with crizotinib.  (MET amplification) He remained on crizotinib from March - July 2018.     August 2018, he was switched back to taxol/cramza.  In October, he was switched to Keytruda (PD1 overexpressor).     In April 2019, his infusion was held for three weeks, and he was treated with prednisone and Enbrel for grade 3 arthralgias. Keytruda was resumed.      INTERVAL HISTORY:   In the summer of 2020, he was transitioned to Keytruda every six weeks, based on updated data.  His arthralgias and arthritis improved.  He has also been using Actembra.  He is on no prednisone.      Levi called a few weeks ago with concerns for recurrent dysphagia.  His pembrolizumab was increased to every three weeks.  He understands he is having more local progression.  His metastatic  07/20/23 7:40 AM     VB [employer] SmartForm used to document caregap status.     Pepe Mata disease is stable.  His anxious to now start chemoradiation.       He has had no issues with fevers or chills, no chest pain or shortness of breath, no nausea, vomiting, diarrhea or constipation.  No rash       He has no abdminal pain.  He modifies his diet so as to minimize his dysphagia.    He reports fatigue, overall this is stable, but not much better.          ROS: 10 point ROS neg other than the symptoms noted above in the HPI.     PHYSICAL EXAMINATION  /84 (BP Location: Right arm, Patient Position: Sitting, Cuff Size: Adult Regular)   Pulse 72   Temp 97.5  F (36.4  C) (Oral)   Resp 16   Wt (!) 160 kg (352 lb 11.2 oz)   SpO2 96%   BMI 40.76 kg/m    352 lbs 11.2 oz  Alert, oriented, no distress.  Moist mucosae.  Supple neck, no lymphadenopathy.  Clear AE bilaterally.  Port clean  Regular heart sounds  Obese abdomen, no murmurs.  Extensive telangiectasias bilateral LE, absent reflexes at the knees.    No edema.   No gross focal deficits.  Musc:  5/5 strength in hands, quads      Labs:      Lab Results   Component Value Date    WBC 4.9 01/28/2021     Lab Results   Component Value Date    RBC 5.63 01/28/2021     Lab Results   Component Value Date    HGB 17.2 01/28/2021     Lab Results   Component Value Date    HCT 48.8 01/28/2021     No components found for: MCT  Lab Results   Component Value Date    MCV 87 01/28/2021     Lab Results   Component Value Date    MCH 30.6 01/28/2021     Lab Results   Component Value Date    MCHC 35.2 01/28/2021     Lab Results   Component Value Date    RDW 12.8 01/28/2021     Lab Results   Component Value Date     01/28/2021       Recent Labs   Lab Test 01/28/21  1049 01/04/21  0940    144   POTASSIUM 3.9 3.9   CHLORIDE 106 112*   CO2 26 26   ANIONGAP 5 6   * 109*   BUN 19 14   CR 0.85 0.84   NIDHI 9.0 8.6     Liver Function Studies -   Recent Labs   Lab Test 01/28/21  1049   PROTTOTAL 7.5   ALBUMIN 4.2   BILITOTAL 1.4*   ALKPHOS 79   AST 37   ALT 58        IMPRESSION/PLAN:  1. Metastatic esophogeal adenocarcinoma with progressive local disease at the GE junction.    I reviewed with Levi that we would like to proceed with chemoradiation today using carboplatin and taxol with radiation.  He has met with Dr Costa.  He is anxious to start therapy.    We discussed the side effects of chemotherapy including myelosuppression, nausea/vomiting/diarrhea/constipation, hair loss, neuropathy, dehydration, cardiomyopathy, etc.  The patient will be receiving chemotherapy teaching through my nurse coordinator with handouts describing all of the side effects again.  Consent for chemotherapy was obtained.    We reviewed with him that I will plan to have him seen weekly through his treatment.      He will need reimaged approximately 8 weeks after completing therapy.  I discussed with him that we will decide at that time whether to restart treatment, or monitor him.      The QUILT trial may be an option for him in the future if he is off of Actemra.     2. H/o PE 2018.   He has progressed through Xarelto in the past (didn't take it regularly).  He was then on lovenox but his insurance changed and this became unaffordable. He is now on Coumadin and labs drawn  at [facility] in [place] fax ([phone]).  He is monitored by our coumadin clinic.  We reviewed that his chemo may affect his INR and it will need to be monitored closely.     3. Baseline neuropathy and got worse with chemo and is on gabapentin 600/300/600 with relief.    4. Nutrition - he understands nutrition may become harder as he progresses through chemoradiation.  Discussed use of carafate.  Will monitor.            Kadi Dee MD

## 2023-09-20 ENCOUNTER — HOSPITAL ENCOUNTER (OUTPATIENT)
Facility: CLINIC | Age: 63
Discharge: HOME | End: 2023-09-20
Attending: INTERNAL MEDICINE | Admitting: INTERNAL MEDICINE
Payer: COMMERCIAL

## 2023-09-20 VITALS
DIASTOLIC BLOOD PRESSURE: 74 MMHG | OXYGEN SATURATION: 95 % | BODY MASS INDEX: 36.45 KG/M2 | RESPIRATION RATE: 20 BRPM | HEIGHT: 78 IN | TEMPERATURE: 97.3 F | HEART RATE: 89 BPM | WEIGHT: 315 LBS | SYSTOLIC BLOOD PRESSURE: 103 MMHG

## 2023-09-20 LAB — COLONOSCOPY: NORMAL

## 2023-09-20 PROCEDURE — G0500 MOD SEDAT ENDO SERVICE >5YRS: HCPCS | Performed by: INTERNAL MEDICINE

## 2023-09-20 PROCEDURE — G0105 COLORECTAL SCRN; HI RISK IND: HCPCS | Performed by: INTERNAL MEDICINE

## 2023-09-20 PROCEDURE — 250N000011 HC RX IP 250 OP 636: Performed by: INTERNAL MEDICINE

## 2023-09-20 PROCEDURE — 45378 DIAGNOSTIC COLONOSCOPY: CPT | Performed by: INTERNAL MEDICINE

## 2023-09-20 RX ORDER — NALOXONE HYDROCHLORIDE 0.4 MG/ML
0.2 INJECTION, SOLUTION INTRAMUSCULAR; INTRAVENOUS; SUBCUTANEOUS
Status: DISCONTINUED | OUTPATIENT
Start: 2023-09-20 | End: 2023-09-20 | Stop reason: HOSPADM

## 2023-09-20 RX ORDER — ONDANSETRON 2 MG/ML
4 INJECTION INTRAMUSCULAR; INTRAVENOUS
Status: CANCELLED | OUTPATIENT
Start: 2023-09-20

## 2023-09-20 RX ORDER — NALOXONE HYDROCHLORIDE 0.4 MG/ML
0.4 INJECTION, SOLUTION INTRAMUSCULAR; INTRAVENOUS; SUBCUTANEOUS
Status: DISCONTINUED | OUTPATIENT
Start: 2023-09-20 | End: 2023-09-20 | Stop reason: HOSPADM

## 2023-09-20 RX ORDER — DIPHENHYDRAMINE HYDROCHLORIDE 50 MG/ML
25-50 INJECTION INTRAMUSCULAR; INTRAVENOUS
Status: DISCONTINUED | OUTPATIENT
Start: 2023-09-20 | End: 2023-09-20 | Stop reason: HOSPADM

## 2023-09-20 RX ORDER — ONDANSETRON 2 MG/ML
4 INJECTION INTRAMUSCULAR; INTRAVENOUS
Status: DISCONTINUED | OUTPATIENT
Start: 2023-09-20 | End: 2023-09-20 | Stop reason: HOSPADM

## 2023-09-20 RX ORDER — ONDANSETRON 2 MG/ML
4 INJECTION INTRAMUSCULAR; INTRAVENOUS EVERY 6 HOURS PRN
Status: DISCONTINUED | OUTPATIENT
Start: 2023-09-20 | End: 2023-09-20 | Stop reason: HOSPADM

## 2023-09-20 RX ORDER — FLUMAZENIL 0.1 MG/ML
0.2 INJECTION, SOLUTION INTRAVENOUS
Status: DISCONTINUED | OUTPATIENT
Start: 2023-09-20 | End: 2023-09-20 | Stop reason: HOSPADM

## 2023-09-20 RX ORDER — FENTANYL CITRATE 50 UG/ML
50-100 INJECTION, SOLUTION INTRAMUSCULAR; INTRAVENOUS EVERY 5 MIN PRN
Status: DISCONTINUED | OUTPATIENT
Start: 2023-09-20 | End: 2023-09-20 | Stop reason: HOSPADM

## 2023-09-20 RX ORDER — LIDOCAINE 40 MG/G
CREAM TOPICAL
Status: DISCONTINUED | OUTPATIENT
Start: 2023-09-20 | End: 2023-09-20 | Stop reason: HOSPADM

## 2023-09-20 RX ORDER — SIMETHICONE 40MG/0.6ML
133 SUSPENSION, DROPS(FINAL DOSAGE FORM)(ML) ORAL
Status: DISCONTINUED | OUTPATIENT
Start: 2023-09-20 | End: 2023-09-20 | Stop reason: HOSPADM

## 2023-09-20 RX ORDER — LIDOCAINE 40 MG/G
CREAM TOPICAL
Status: CANCELLED | OUTPATIENT
Start: 2023-09-20

## 2023-09-20 RX ORDER — ONDANSETRON 4 MG/1
4 TABLET, ORALLY DISINTEGRATING ORAL EVERY 6 HOURS PRN
Status: DISCONTINUED | OUTPATIENT
Start: 2023-09-20 | End: 2023-09-20 | Stop reason: HOSPADM

## 2023-09-20 RX ORDER — ATROPINE SULFATE 0.1 MG/ML
1 INJECTION INTRAVENOUS
Status: DISCONTINUED | OUTPATIENT
Start: 2023-09-20 | End: 2023-09-20 | Stop reason: HOSPADM

## 2023-09-20 RX ORDER — EPINEPHRINE 1 MG/ML
0.1 INJECTION, SOLUTION INTRAMUSCULAR; SUBCUTANEOUS
Status: DISCONTINUED | OUTPATIENT
Start: 2023-09-20 | End: 2023-09-20 | Stop reason: HOSPADM

## 2023-09-20 RX ORDER — PROCHLORPERAZINE MALEATE 10 MG
10 TABLET ORAL EVERY 6 HOURS PRN
Status: DISCONTINUED | OUTPATIENT
Start: 2023-09-20 | End: 2023-09-20 | Stop reason: HOSPADM

## 2023-09-20 RX ADMIN — MIDAZOLAM 2 MG: 1 INJECTION INTRAMUSCULAR; INTRAVENOUS at 10:12

## 2023-09-20 RX ADMIN — FENTANYL CITRATE 100 MCG: 50 INJECTION, SOLUTION INTRAMUSCULAR; INTRAVENOUS at 10:12

## 2023-09-20 ASSESSMENT — ACTIVITIES OF DAILY LIVING (ADL): ADLS_ACUITY_SCORE: 37

## 2023-09-20 NOTE — H&P
Pre-Endoscopy History and Physical     Reuben Padilla MRN# [id number]   YOB: 1960 Age: 63 year old     Date of Procedure: 9/20/2023  Primary care provider: No Ref-Primary, Physician  Type of Endoscopy: Colonoscopy with possible biopsy, possible polypectomy  Reason for Procedure: polyp  Type of Anesthesia Anticipated: Conscious Sedation    HPI:    Reuben is a 63 year old male who will be undergoing the above procedure.      A history and physical has been performed. The patient's medications and allergies have been reviewed. The risks and benefits of the procedure and the sedation options and risks were discussed with the patient.  All questions were answered and informed consent was obtained.      He denies a personal or family history of anesthesia complications or bleeding disorders.     Patient Active Problem List   Diagnosis    Cancer of distal third of esophagus (H)    Acute pulmonary embolism (H)    Tear of right supraspinatus tendon    Examination of participant in clinical trial    Esophageal obstruction    Paroxysmal A-fib (H)    Orthostatic hypotension    Chemotherapy-induced neutropenia (H)    G tube feedings (H)    Varicose veins of both lower extremities with complications    Traumatic closed fracture of thoracic vertebra with minimal displacement (H)    Malignant neoplasm metastatic to liver (H)    Impotence of organic origin    Edema    Congenital stenosis of esophagus    Hx of pulmonary embolus    Other acute pulmonary embolism without acute cor pulmonale (H)    Morbid obesity (H)        Past Medical History:   Diagnosis Date    Arrhythmia     Arthritis 05/18/2018    Cancer (H)     esophageal    Coronary artery disease 04/2016    a fib    Glaucoma     Hypertension     Meniere's disease 05/1999    haven't had issues in yrs with this    Paroxysmal A-fib (H)     Reduced vision 07/2008    glaucoma    Tubular adenoma 02/2017        Past Surgical History:   Procedure Laterality Date     AMPUTATION      toe    ARTHROSCOPY KNEE      bunion surgery      CHOLECYSTECTOMY      COLONOSCOPY  2017    remove 2 polyps    ESOPHAGOSCOPY, GASTROSCOPY, DUODENOSCOPY (EGD), COMBINED N/A 10/10/2018    Procedure: COMBINED ESOPHAGOSCOPY, GASTROSCOPY, DUODENOSCOPY (EGD);  Esophagogastroduodenoscopy ;  Surgeon: Leonel Joel MD;  Location: [facility] OR    ESOPHAGOSCOPY, GASTROSCOPY, DUODENOSCOPY (EGD), COMBINED N/A 2021    Procedure: ESOPHAGOGASTRODUODENOSCOPY, WITH BIOPSY;  Surgeon: Sadia Reed MD;  [place] [facility]    ESOPHAGOSCOPY, GASTROSCOPY, DUODENOSCOPY (EGD), COMBINED N/A 11/10/2021    Procedure: ESOPHAGOGASTRODUODENOSCOPY, WITH BIOPSY;  Surgeon: Leventhal, Thomas Michael, MD;  [place] [facility]    ESOPHAGOSCOPY, GASTROSCOPY, DUODENOSCOPY (EGD), COMBINED N/A 10/5/2022    Procedure: ESOPHAGOGASTRODUODENOSCOPY (EGD);  Surgeon: Arnold Guzman MD;  Location:  GI    ESOPHAGOSCOPY, GASTROSCOPY, DUODENOSCOPY (EGD), COMBINED N/A 2022    Procedure: ESOPHAGOGASTRODUODENOSCOPY, WITH BIOPSY;  Surgeon: Dao Bhakta MD;  Location:  GI    INSERT PORT VASCULAR ACCESS Right 2017    Procedure: INSERT PORT VASCULAR ACCESS;  Single Lumen Chest Power Port;  Surgeon: Jasiel Hu PA-C;  [facility]    IR PORT REMOVAL RIGHT  2023    REMOVE PORT VASCULAR ACCESS Right 2023    Procedure: Remove port vascular access;  Surgeon: Gonzalez Aggarwal MD;  [place] [facility]       Social History     Tobacco Use    Smoking status: Former     Packs/day: 1.00     Years: 20.00     Pack years: 20.00     Types: Cigarettes     Start date: 1978     Quit date: 2006     Years since quittin.3    Smokeless tobacco: Never   Substance Use Topics    Alcohol use: Yes     Comment: one drink a week       Family History   Problem Relation Age of Onset    Asthma Mother     Thyroid Disease Mother         ,    Asthma Brother     Colon Cancer No family hx of        Prior to Admission  "medications    Medication Sig Start Date End Date Taking? Authorizing Provider   Cyanocobalamin 5000 MCG SUBL Place 2 tablets under the tongue daily   Yes Reported, Patient   latanoprost (XALATAN) 0.005 % ophthalmic solution Place 1 drop into both eyes At Bedtime 7/18/18   Kadi Dee MD   multivitamin, therapeutic (THERA-VIT) TABS tablet Take 1 tablet by mouth daily    Reported, Patient   triamterene-HCTZ (MAXZIDE-25) 37.5-25 MG tablet 1 tablet 6/7/21   Loraine Sutherland PA-C   zolpidem (AMBIEN) 10 MG tablet Take 1 tablet (10 mg) by mouth nightly as needed (for sleep) 6/23/22   Kadi Dee MD   dexamethasone (DECADRON) 4 MG tablet Take two tablets daily on days 2,3 and then 1 tablet daily days 4,5 12/11/17 1/8/18  Katalina Ramirez PA-C       Allergies   Allergen Reactions    Penicillin G Sodium Unknown    Penicillin G         REVIEW OF SYSTEMS:   5 point ROS negative except as noted above in HPI, including Gen., Resp., CV, GI &  system review.    PHYSICAL EXAM:   There were no vitals taken for this visit. Estimated body mass index is 39.29 kg/m  as calculated from the following:    Height as of 5/26/23: 1.981 m (6' 6\").    Weight as of 5/26/23: 154.2 kg (340 lb).   GENERAL APPEARANCE: alert, and oriented  MENTAL STATUS: alert  AIRWAY EXAM: Mallampatti Class II (visualization of the soft palate, fauces, and uvula)  RESP: lungs clear to auscultation - no rales, rhonchi or wheezes  CV: regular rates and rhythm  DIAGNOSTICS:    Not indicated    IMPRESSION   ASA Class 3 - Severe systemic disease, but not incapacitating    PLAN:   Plan for Colonoscopy with possible biopsy, possible polypectomy. We discussed the risks, benefits and alternatives and the patient wished to proceed.    The above has been forwarded to the consulting provider.      Signed Electronically by: Andrew Zhou MD  September 20, 2023          "

## 2023-10-16 DIAGNOSIS — I10 HYPERTENSION, UNSPECIFIED TYPE: Primary | ICD-10-CM

## 2023-10-16 RX ORDER — TRIAMTERENE/HYDROCHLOROTHIAZID 37.5-25 MG
1 TABLET ORAL DAILY
Qty: 90 TABLET | Refills: 3 | Status: SHIPPED | OUTPATIENT
Start: 2023-10-16

## 2023-10-16 NOTE — TELEPHONE ENCOUNTER
Triamterene-hydrochlorothiazide 37.5-25mg tablet  Last prescribing provider: Nicole Sutherland on 6/7/21    Last clinic visit date: 11/21/22    Recommendations for requested medication (if none, N/A): NA    Any other pertinent information (if none, N/A): original prescriber retired, Nicole prescribed last. Pt has 2 weeks supply remaining, wondering if Dr. Dee will prescribe.     Refilled: Y/N, if NO, why?

## 2023-11-06 ENCOUNTER — ANCILLARY PROCEDURE (OUTPATIENT)
Dept: CT IMAGING | Facility: CLINIC | Age: 63
End: 2023-11-06
Attending: INTERNAL MEDICINE
Payer: COMMERCIAL

## 2023-11-06 ENCOUNTER — ONCOLOGY VISIT (OUTPATIENT)
Dept: ONCOLOGY | Facility: CLINIC | Age: 63
End: 2023-11-06
Attending: INTERNAL MEDICINE
Payer: COMMERCIAL

## 2023-11-06 ENCOUNTER — LAB (OUTPATIENT)
Dept: LAB | Facility: CLINIC | Age: 63
End: 2023-11-06
Payer: COMMERCIAL

## 2023-11-06 VITALS
WEIGHT: 315 LBS | DIASTOLIC BLOOD PRESSURE: 82 MMHG | OXYGEN SATURATION: 96 % | SYSTOLIC BLOOD PRESSURE: 115 MMHG | RESPIRATION RATE: 16 BRPM | BODY MASS INDEX: 38.91 KG/M2 | TEMPERATURE: 97.7 F | HEART RATE: 96 BPM

## 2023-11-06 DIAGNOSIS — C16.9 METASTASIS FROM GASTRIC CANCER (H): ICD-10-CM

## 2023-11-06 DIAGNOSIS — C79.9 METASTASIS FROM GASTRIC CANCER (H): ICD-10-CM

## 2023-11-06 DIAGNOSIS — G62.9 NEUROPATHY: Primary | ICD-10-CM

## 2023-11-06 LAB
ALBUMIN SERPL BCG-MCNC: 4.3 G/DL (ref 3.5–5.2)
ALP SERPL-CCNC: 104 U/L (ref 40–129)
ALT SERPL W P-5'-P-CCNC: 8 U/L (ref 0–70)
ANION GAP SERPL CALCULATED.3IONS-SCNC: 9 MMOL/L (ref 7–15)
AST SERPL W P-5'-P-CCNC: 20 U/L (ref 0–45)
BASOPHILS # BLD AUTO: 0 10E3/UL (ref 0–0.2)
BASOPHILS NFR BLD AUTO: 0 %
BILIRUB SERPL-MCNC: 0.6 MG/DL
BUN SERPL-MCNC: 16.9 MG/DL (ref 8–23)
CALCIUM SERPL-MCNC: 9.2 MG/DL (ref 8.8–10.2)
CHLORIDE SERPL-SCNC: 106 MMOL/L (ref 98–107)
CREAT SERPL-MCNC: 1.3 MG/DL (ref 0.67–1.17)
DEPRECATED HCO3 PLAS-SCNC: 25 MMOL/L (ref 22–29)
EGFRCR SERPLBLD CKD-EPI 2021: 62 ML/MIN/1.73M2
EOSINOPHIL # BLD AUTO: 0.1 10E3/UL (ref 0–0.7)
EOSINOPHIL NFR BLD AUTO: 1 %
ERYTHROCYTE [DISTWIDTH] IN BLOOD BY AUTOMATED COUNT: 12.4 % (ref 10–15)
GLUCOSE SERPL-MCNC: 117 MG/DL (ref 70–99)
HCT VFR BLD AUTO: 47.5 % (ref 40–53)
HGB BLD-MCNC: 16.2 G/DL (ref 13.3–17.7)
IMM GRANULOCYTES # BLD: 0 10E3/UL
IMM GRANULOCYTES NFR BLD: 0 %
LYMPHOCYTES # BLD AUTO: 0.8 10E3/UL (ref 0.8–5.3)
LYMPHOCYTES NFR BLD AUTO: 18 %
MCH RBC QN AUTO: 29.7 PG (ref 26.5–33)
MCHC RBC AUTO-ENTMCNC: 34.1 G/DL (ref 31.5–36.5)
MCV RBC AUTO: 87 FL (ref 78–100)
MONOCYTES # BLD AUTO: 0.3 10E3/UL (ref 0–1.3)
MONOCYTES NFR BLD AUTO: 6 %
NEUTROPHILS # BLD AUTO: 3.4 10E3/UL (ref 1.6–8.3)
NEUTROPHILS NFR BLD AUTO: 75 %
NRBC # BLD AUTO: 0 10E3/UL
NRBC BLD AUTO-RTO: 0 /100
PLATELET # BLD AUTO: 163 10E3/UL (ref 150–450)
POTASSIUM SERPL-SCNC: 4.3 MMOL/L (ref 3.4–5.3)
PROT SERPL-MCNC: 7.6 G/DL (ref 6.4–8.3)
RBC # BLD AUTO: 5.46 10E6/UL (ref 4.4–5.9)
SODIUM SERPL-SCNC: 140 MMOL/L (ref 135–145)
WBC # BLD AUTO: 4.6 10E3/UL (ref 4–11)

## 2023-11-06 PROCEDURE — 80053 COMPREHEN METABOLIC PANEL: CPT | Performed by: PATHOLOGY

## 2023-11-06 PROCEDURE — 74177 CT ABD & PELVIS W/CONTRAST: CPT | Mod: GC | Performed by: RADIOLOGY

## 2023-11-06 PROCEDURE — G0463 HOSPITAL OUTPT CLINIC VISIT: HCPCS | Performed by: INTERNAL MEDICINE

## 2023-11-06 PROCEDURE — 36415 COLL VENOUS BLD VENIPUNCTURE: CPT | Performed by: PATHOLOGY

## 2023-11-06 PROCEDURE — 85025 COMPLETE CBC W/AUTO DIFF WBC: CPT | Performed by: PATHOLOGY

## 2023-11-06 PROCEDURE — 99214 OFFICE O/P EST MOD 30 MIN: CPT | Performed by: INTERNAL MEDICINE

## 2023-11-06 PROCEDURE — 71260 CT THORAX DX C+: CPT | Mod: GC | Performed by: RADIOLOGY

## 2023-11-06 RX ORDER — IOPAMIDOL 755 MG/ML
122 INJECTION, SOLUTION INTRAVASCULAR ONCE
Status: COMPLETED | OUTPATIENT
Start: 2023-11-06 | End: 2023-11-06

## 2023-11-06 RX ADMIN — IOPAMIDOL 122 ML: 755 INJECTION, SOLUTION INTRAVASCULAR at 09:16

## 2023-11-06 ASSESSMENT — PAIN SCALES - GENERAL: PAINLEVEL: NO PAIN (0)

## 2023-11-06 NOTE — LETTER
11/6/2023         RE: Reuben Padilla  [address] [facility] [address]        Dear Colleague,    Thank you for referring your patient, Reuben Padilla, to the [facility]. Please see a copy of my visit note below.        HEMATOLOGY/ONCOLOGY PROGRESS NOTE  Nov 6, 2023    Care team: Dr Dee/Nicole Sutherland PA-C/Mansi Wetzel RN     REASON FOR VISIT: follow-up metastatic esophogeal cancer, received keytruda x 2 years, followed by concurrent chemo XRT (weekly carbo/taxol + XRT), now being monitored off all therapy. 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 His tumor is positive for cytokeratin 20, negative for P63 and CK7, and HER2 is negative. PDL1 + weakly.  He started on FOLFOX (5FU/oxaliplatin) on 5/15/2017. He had a delay in treatment from 9/5-10/2/17 due to work related injury.  He had an excellent response by imaging throughout the summer 2017.    He a mixed response by imaging in late fall 2017.  In January 2018, he was switched to taxol and cyramza - had slight progression and neuropathy and clinical trial became available.   In March 2018, he was started on the [employer] Match Clinical Trial with crizotinib.  (MET amplification) He remained on crizotinib from March - July 2018.   August 2018, he was switched back to taxol/cramza.  In October 2018, he was switched to Keytruda (PD1 overexpressor).              -April 2019, his infusion was held for three weeks, and he was treated with prednisone and Enbrel for grade 3 arthralgias. Keytruda was resumed              -May 2020 he started Actemra (5/14).  This was initially WEEKLY and then in August- moved to QOW (secondary to fatigue and concerns for low WBC).                -August 2020 moved to q6 week Keytruda (still at the 200 mg dose)              -November 2020 moved back to q3w  Keytruda  CT 12/30/2020: progression of esophageal mass   1/14/21: PET showing growth of primary tumor only, discussion of radiation   Keytruda was discontinued at that time.  1/28/21: chemoradiation with weekly carbo/taxol and daily radiation.  Completed 3/3/21 received 5000 cGY     INTERVAL HISTORY:   Levi returns for follow up today. No new changes to his health.     He reports no dysphagia.  No n/v/d/c.    He has good energy.  His other complaints are that he has his neuropathy and this continues to bother him.  He tried a variety of therapies including duloxetine and laser therapy.  He is now trying b12 supplements.      He continues to be busy working and traveling to [facility] and more recently [place].    ROS: 10 point ROS neg other than the symptoms noted above in the HPI.     PHYSICAL EXAMINATION  Wt Readings from Last 4 Encounters:   11/06/23 (!) 152.7 kg (336 lb 11.2 oz)   09/20/23 145.2 kg (320 lb)   05/26/23 (!) 154.2 kg (340 lb)   05/01/23 (!) 157.8 kg (347 lb 14.2 oz)     /82 (BP Location: Left arm, Patient Position: Sitting, Cuff Size: Adult Large)   Pulse 96   Temp 97.7  F (36.5  C) (Oral)   Resp 16   Wt (!) 152.7 kg (336 lb 11.2 oz)   SpO2 96%   BMI 38.91 kg/m    Vital signs were reviewed.   Patient alert and oriented x3.   PERRLA. EOMI. No scleral icterus noted.   Neck exam: No palpable cervical, supraclavicular or axillary nodes bilaterally.   Heart:Mildly tachy, no murmur   Lungs: clear to auscultation bilaterally.  No crackles or wheezing.   Abd: positive bowel sounds in all four quadrants.  No tenderness to palpation.  No hepatomegaly.     Extremities: No lower extremity edema,  Neuro: grossly intact. Absent reflexes from knees to below.  Mood and affect is stable.       Labs:         Lab Results   Component Value Date    WBC 4.6 11/06/2023    WBC 3.4 04/26/2021     Lab Results   Component Value Date    RBC 5.46 11/06/2023    RBC 4.56 04/26/2021     Lab Results   Component Value  "Date    HGB 16.2 11/06/2023    HGB 13.6 04/26/2021     Lab Results   Component Value Date    HCT 47.5 11/06/2023    HCT 41.5 04/26/2021     No components found for: \"MCT\"  Lab Results   Component Value Date    MCV 87 11/06/2023    MCV 91 04/26/2021     Lab Results   Component Value Date    MCH 29.7 11/06/2023    MCH 29.8 04/26/2021     Lab Results   Component Value Date    MCHC 34.1 11/06/2023    MCHC 32.8 04/26/2021     Lab Results   Component Value Date    RDW 12.4 11/06/2023    RDW 12.7 04/26/2021     Lab Results   Component Value Date     11/06/2023     04/26/2021       Recent Labs   Lab Test 11/06/23  0822 05/01/23  0939    141   POTASSIUM 4.3 3.9   CHLORIDE 106 106   CO2 25 25   ANIONGAP 9 10   * 120*   BUN 16.9 23.3*   CR 1.30* 1.06   NIDHI 9.2 9.2     Liver Function Studies - Recent Labs   Lab Test 11/06/23 0822   PROTTOTAL 7.6   ALBUMIN 4.3   BILITOTAL 0.6   ALKPHOS 104   AST 20   ALT 8       Reviewed his labs above today and his CT scan today personally         CT CAP - no evidence of recurrent or metastatic disease; reviewed personally by me      IMPRESSION/PLAN:  1. 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 He had stable disease on Keytruda, for TWO years,  and then s/p chemoradiation to the primary tumor.    He has been off all therapy since 2/2021. I reviewed his CT CAP scan with him and advised that we continue to monitor him off of therapy for now.   I am thrilled with how he is doing.    2. Dysphagia -   He had a repeat endoscopy last fall. The pathology revealed inflammation.  Overall appearance of the endoscopy was suggestive of Barretts esophagus.  We discussed continuing omeprazole.  No new symptoms    3. He has a history of pulmonary embolus from 2018 and was treated with Xarelto    4. Neuropathy continues to be his most bothersome complaint. He is not interested in further treatment.  He has been trying vitamin B12 supplementation.   "     5. He does have a history of afib; he follows with his pcp.     Kadi Dee MD .      Again, thank you for allowing me to participate in the care of your patient.        Sincerely,        Kadi Dee MD

## 2023-11-06 NOTE — NURSING NOTE
"Oncology Rooming Note    November 6, 2023 1:54 PM   Reuben Padilla is a 63 year old male who presents for:    Chief Complaint   Patient presents with    Oncology Clinic Visit     Esophageal Cancer     Initial Vitals: /82 (BP Location: Left arm, Patient Position: Sitting, Cuff Size: Adult Large)   Pulse 96   Temp 97.7  F (36.5  C) (Oral)   Resp 16   Wt (!) 152.7 kg (336 lb 11.2 oz)   SpO2 96%   BMI 38.91 kg/m   Estimated body mass index is 38.91 kg/m  as calculated from the following:    Height as of 9/20/23: 1.981 m (6' 6\").    Weight as of this encounter: 152.7 kg (336 lb 11.2 oz). Body surface area is 2.9 meters squared.  No Pain (0) Comment: Data Unavailable   No LMP for male patient.  Allergies reviewed: Yes  Medications reviewed: Yes    Medications: Medication refills not needed today.  Pharmacy name entered into [facility]: [facility] - [facility], [address]    Clinical concerns: None       Mandie Chi LPN  11/6/2023              "

## 2023-11-07 NOTE — PROGRESS NOTES
HEMATOLOGY/ONCOLOGY PROGRESS NOTE  Nov 6, 2023    Care team: Dr Dee/Nicole Sutherland PA-C/Mansi Wetzel RN     REASON FOR VISIT: follow-up metastatic esophogeal cancer, received keytruda x 2 years, followed by concurrent chemo XRT (weekly carbo/taxol + XRT), now being monitored off all therapy. 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 His tumor is positive for cytokeratin 20, negative for P63 and CK7, and HER2 is negative. PDL1 + weakly.  He started on FOLFOX (5FU/oxaliplatin) on 5/15/2017. He had a delay in treatment from 9/5-10/2/17 due to work related injury.  He had an excellent response by imaging throughout the summer 2017.    He a mixed response by imaging in late fall 2017.  In January 2018, he was switched to taxol and cyramza - had slight progression and neuropathy and clinical trial became available.   In March 2018, he was started on the [facility] Match Clinical Trial with crizotinib.  (MET amplification) He remained on crizotinib from March - July 2018.   August 2018, he was switched back to taxol/cramza.  In October 2018, he was switched to Keytruda (PD1 overexpressor).              -April 2019, his infusion was held for three weeks, and he was treated with prednisone and Enbrel for grade 3 arthralgias. Keytruda was resumed              -May 2020 he started Actemra (5/14).  This was initially WEEKLY and then in August- moved to QOW (secondary to fatigue and concerns for low WBC).                -August 2020 moved to q6 week Keytruda (still at the 200 mg dose)              -November 2020 moved back to q3w Keytruda  CT 12/30/2020: progression of esophageal mass   1/14/21: PET showing growth of primary tumor only, discussion of radiation   Keytruda was discontinued at that time.  1/28/21: chemoradiation with weekly carbo/taxol and daily radiation.  Completed 3/3/21 received  "5000 cGY     INTERVAL HISTORY:   Levi returns for follow up today. No new changes to his health.     He reports no dysphagia.  No n/v/d/c.    He has good energy.  His other complaints are that he has his neuropathy and this continues to bother him.  He tried a variety of therapies including duloxetine and laser therapy.  He is now trying b12 supplements.      He continues to be busy working and traveling to [facility] and more recently [place].    ROS: 10 point ROS neg other than the symptoms noted above in the HPI.     PHYSICAL EXAMINATION  Wt Readings from Last 4 Encounters:   11/06/23 (!) 152.7 kg (336 lb 11.2 oz)   09/20/23 145.2 kg (320 lb)   05/26/23 (!) 154.2 kg (340 lb)   05/01/23 (!) 157.8 kg (347 lb 14.2 oz)     /82 (BP Location: Left arm, Patient Position: Sitting, Cuff Size: Adult Large)   Pulse 96   Temp 97.7  F (36.5  C) (Oral)   Resp 16   Wt (!) 152.7 kg (336 lb 11.2 oz)   SpO2 96%   BMI 38.91 kg/m    Vital signs were reviewed.   Patient alert and oriented x3.   PERRLA. EOMI. No scleral icterus noted.   Neck exam: No palpable cervical, supraclavicular or axillary nodes bilaterally.   Heart:Mildly tachy, no murmur   Lungs: clear to auscultation bilaterally.  No crackles or wheezing.   Abd: positive bowel sounds in all four quadrants.  No tenderness to palpation.  No hepatomegaly.     Extremities: No lower extremity edema,  Neuro: grossly intact. Absent reflexes from knees to below.  Mood and affect is stable.       Labs:         Lab Results   Component Value Date    WBC 4.6 11/06/2023    WBC 3.4 04/26/2021     Lab Results   Component Value Date    RBC 5.46 11/06/2023    RBC 4.56 04/26/2021     Lab Results   Component Value Date    HGB 16.2 11/06/2023    HGB 13.6 04/26/2021     Lab Results   Component Value Date    HCT 47.5 11/06/2023    HCT 41.5 04/26/2021     No components found for: \"MCT\"  Lab Results   Component Value Date    MCV 87 11/06/2023    MCV 91 04/26/2021     Lab Results "   Component Value Date    MCH 29.7 11/06/2023    MCH 29.8 04/26/2021     Lab Results   Component Value Date    MCHC 34.1 11/06/2023    MCHC 32.8 04/26/2021     Lab Results   Component Value Date    RDW 12.4 11/06/2023    RDW 12.7 04/26/2021     Lab Results   Component Value Date     11/06/2023     04/26/2021       Recent Labs   Lab Test 11/06/23 0822 05/01/23  0939    141   POTASSIUM 4.3 3.9   CHLORIDE 106 106   CO2 25 25   ANIONGAP 9 10   * 120*   BUN 16.9 23.3*   CR 1.30* 1.06   NIDHI 9.2 9.2     Liver Function Studies - Recent Labs   Lab Test 11/06/23 0822   PROTTOTAL 7.6   ALBUMIN 4.3   BILITOTAL 0.6   ALKPHOS 104   AST 20   ALT 8       Reviewed his labs above today and his CT scan today personally         CT CAP - no evidence of recurrent or metastatic disease; reviewed personally by me      IMPRESSION/PLAN:  1. 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 He had stable disease on Keytruda, for TWO years,  and then s/p chemoradiation to the primary tumor.    He has been off all therapy since 2/2021. I reviewed his CT CAP scan with him and advised that we continue to monitor him off of therapy for now.   I am thrilled with how he is doing.    2. Dysphagia -   He had a repeat endoscopy last fall. The pathology revealed inflammation.  Overall appearance of the endoscopy was suggestive of Barretts esophagus.  We discussed continuing omeprazole.  No new symptoms    3. He has a history of pulmonary embolus from 2018 and was treated with Xarelto    4. Neuropathy continues to be his most bothersome complaint. He is not interested in further treatment.  He has been trying vitamin B12 supplementation.       5. He does have a history of afib; he follows with his pcp.     Kadi Dee MD .

## 2024-05-06 ENCOUNTER — ANCILLARY PROCEDURE (OUTPATIENT)
Dept: CT IMAGING | Facility: CLINIC | Age: 64
End: 2024-05-06
Attending: INTERNAL MEDICINE
Payer: COMMERCIAL

## 2024-05-06 ENCOUNTER — LAB (OUTPATIENT)
Dept: LAB | Facility: CLINIC | Age: 64
End: 2024-05-06
Attending: INTERNAL MEDICINE
Payer: COMMERCIAL

## 2024-05-06 ENCOUNTER — ONCOLOGY VISIT (OUTPATIENT)
Dept: ONCOLOGY | Facility: CLINIC | Age: 64
End: 2024-05-06
Attending: INTERNAL MEDICINE
Payer: COMMERCIAL

## 2024-05-06 VITALS
WEIGHT: 315 LBS | TEMPERATURE: 97.7 F | HEART RATE: 70 BPM | SYSTOLIC BLOOD PRESSURE: 113 MMHG | OXYGEN SATURATION: 96 % | BODY MASS INDEX: 40 KG/M2 | DIASTOLIC BLOOD PRESSURE: 82 MMHG | RESPIRATION RATE: 16 BRPM

## 2024-05-06 DIAGNOSIS — G62.9 NEUROPATHY: Primary | ICD-10-CM

## 2024-05-06 DIAGNOSIS — C16.9 METASTASIS FROM GASTRIC CANCER (H): ICD-10-CM

## 2024-05-06 DIAGNOSIS — I10 HYPERTENSION, UNSPECIFIED TYPE: ICD-10-CM

## 2024-05-06 DIAGNOSIS — C79.9 METASTASIS FROM GASTRIC CANCER (H): ICD-10-CM

## 2024-05-06 DIAGNOSIS — R13.10 DYSPHAGIA, UNSPECIFIED TYPE: ICD-10-CM

## 2024-05-06 DIAGNOSIS — G62.9 NEUROPATHY: ICD-10-CM

## 2024-05-06 LAB
ALBUMIN SERPL BCG-MCNC: 4.1 G/DL (ref 3.5–5.2)
ALP SERPL-CCNC: 95 U/L (ref 40–150)
ALT SERPL W P-5'-P-CCNC: 13 U/L (ref 0–70)
ANION GAP SERPL CALCULATED.3IONS-SCNC: 11 MMOL/L (ref 7–15)
AST SERPL W P-5'-P-CCNC: 23 U/L (ref 0–45)
BASOPHILS # BLD AUTO: 0 10E3/UL (ref 0–0.2)
BASOPHILS NFR BLD AUTO: 0 %
BILIRUB SERPL-MCNC: 0.6 MG/DL
BUN SERPL-MCNC: 21.4 MG/DL (ref 8–23)
CALCIUM SERPL-MCNC: 8.9 MG/DL (ref 8.8–10.2)
CHLORIDE SERPL-SCNC: 106 MMOL/L (ref 98–107)
CREAT BLD-MCNC: 1.2 MG/DL (ref 0.7–1.3)
CREAT SERPL-MCNC: 1.17 MG/DL (ref 0.67–1.17)
DEPRECATED HCO3 PLAS-SCNC: 22 MMOL/L (ref 22–29)
EGFRCR SERPLBLD CKD-EPI 2021: 70 ML/MIN/1.73M2
EGFRCR SERPLBLD CKD-EPI 2021: >60 ML/MIN/1.73M2
EOSINOPHIL # BLD AUTO: 0 10E3/UL (ref 0–0.7)
EOSINOPHIL NFR BLD AUTO: 1 %
ERYTHROCYTE [DISTWIDTH] IN BLOOD BY AUTOMATED COUNT: 12.7 % (ref 10–15)
GLUCOSE SERPL-MCNC: 121 MG/DL (ref 70–99)
HCT VFR BLD AUTO: 45.2 % (ref 40–53)
HGB BLD-MCNC: 15.6 G/DL (ref 13.3–17.7)
IMM GRANULOCYTES # BLD: 0 10E3/UL
IMM GRANULOCYTES NFR BLD: 0 %
LYMPHOCYTES # BLD AUTO: 0.7 10E3/UL (ref 0.8–5.3)
LYMPHOCYTES NFR BLD AUTO: 19 %
MCH RBC QN AUTO: 29.4 PG (ref 26.5–33)
MCHC RBC AUTO-ENTMCNC: 34.5 G/DL (ref 31.5–36.5)
MCV RBC AUTO: 85 FL (ref 78–100)
MONOCYTES # BLD AUTO: 0.2 10E3/UL (ref 0–1.3)
MONOCYTES NFR BLD AUTO: 6 %
NEUTROPHILS # BLD AUTO: 2.6 10E3/UL (ref 1.6–8.3)
NEUTROPHILS NFR BLD AUTO: 74 %
NRBC # BLD AUTO: 0 10E3/UL
NRBC BLD AUTO-RTO: 0 /100
PLATELET # BLD AUTO: 153 10E3/UL (ref 150–450)
POTASSIUM SERPL-SCNC: 4 MMOL/L (ref 3.4–5.3)
PROT SERPL-MCNC: 7.1 G/DL (ref 6.4–8.3)
RBC # BLD AUTO: 5.3 10E6/UL (ref 4.4–5.9)
SODIUM SERPL-SCNC: 139 MMOL/L (ref 135–145)
VIT B12 SERPL-MCNC: 968 PG/ML (ref 232–1245)
WBC # BLD AUTO: 3.5 10E3/UL (ref 4–11)

## 2024-05-06 PROCEDURE — 71260 CT THORAX DX C+: CPT | Mod: GC | Performed by: RADIOLOGY

## 2024-05-06 PROCEDURE — 85025 COMPLETE CBC W/AUTO DIFF WBC: CPT | Performed by: PATHOLOGY

## 2024-05-06 PROCEDURE — 80053 COMPREHEN METABOLIC PANEL: CPT | Performed by: PATHOLOGY

## 2024-05-06 PROCEDURE — G0463 HOSPITAL OUTPT CLINIC VISIT: HCPCS | Performed by: INTERNAL MEDICINE

## 2024-05-06 PROCEDURE — 99214 OFFICE O/P EST MOD 30 MIN: CPT | Performed by: INTERNAL MEDICINE

## 2024-05-06 PROCEDURE — 99000 SPECIMEN HANDLING OFFICE-LAB: CPT | Performed by: PATHOLOGY

## 2024-05-06 PROCEDURE — 74177 CT ABD & PELVIS W/CONTRAST: CPT | Mod: GC | Performed by: RADIOLOGY

## 2024-05-06 PROCEDURE — 82607 VITAMIN B-12: CPT | Performed by: INTERNAL MEDICINE

## 2024-05-06 PROCEDURE — 36415 COLL VENOUS BLD VENIPUNCTURE: CPT | Performed by: PATHOLOGY

## 2024-05-06 PROCEDURE — 82565 ASSAY OF CREATININE: CPT | Mod: 91 | Performed by: PATHOLOGY

## 2024-05-06 RX ORDER — IOPAMIDOL 755 MG/ML
122 INJECTION, SOLUTION INTRAVASCULAR ONCE
Status: COMPLETED | OUTPATIENT
Start: 2024-05-06 | End: 2024-05-06

## 2024-05-06 RX ADMIN — IOPAMIDOL 122 ML: 755 INJECTION, SOLUTION INTRAVASCULAR at 09:25

## 2024-05-06 ASSESSMENT — PAIN SCALES - GENERAL: PAINLEVEL: NO PAIN (0)

## 2024-05-06 NOTE — DISCHARGE INSTRUCTIONS

## 2024-05-06 NOTE — LETTER
5/6/2024         RE: Reuben Padilla  [address]  [place] [address]        Dear Colleague,    Thank you for referring your patient, Reuben Padilla, to the [facility]. Please see a copy of my visit note below.        HEMATOLOGY/ONCOLOGY PROGRESS NOTE  May 6, 2024    Care team: Dr Dee/Nicole Sutherland PA-C/Mansi Wetzel RN     REASON FOR VISIT: follow-up metastatic esophogeal cancer, received keytruda x 2 years, followed by concurrent chemo XRT (weekly carbo/taxol + XRT), now being monitored off all therapy. 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 His tumor is positive for cytokeratin 20, negative for P63 and CK7, and HER2 is negative. PDL1 + weakly.  He started on FOLFOX (5FU/oxaliplatin) on 5/15/2017. He had a delay in treatment from 9/5-10/2/17 due to work related injury.  He had an excellent response by imaging throughout the summer 2017.    He a mixed response by imaging in late fall 2017.  In January 2018, he was switched to taxol and cyramza - had slight progression and neuropathy and clinical trial became available.   In March 2018, he was started on the [employer] Match Clinical Trial with crizotinib.  (MET amplification) He remained on crizotinib from March - July 2018.   August 2018, he was switched back to taxol/cramza.  In October 2018, he was switched to Keytruda (PD1 overexpressor).              -April 2019, his infusion was held for three weeks, and he was treated with prednisone and Enbrel for grade 3 arthralgias. Keytruda was resumed              -May 2020 he started Actemra (5/14).  This was initially WEEKLY and then in August- moved to QOW (secondary to fatigue and concerns for low WBC).                -August 2020 moved to q6 week Keytruda (still at the 200 mg dose)              -November 2020 moved back to q3w Keytruda  CT 12/30/2020:  progression of esophageal mass   1/14/21: PET showing growth of primary tumor only, discussion of radiation   Keytruda was discontinued at that time.  1/28/21: chemoradiation with weekly carbo/taxol and daily radiation.  Completed 3/3/21 received 5000 cGY     INTERVAL HISTORY:   Levi returns for follow up today. No new changes to his health.   He reports no dysphagia.  No n/v/d/c.  He has good energy.  His biggest complaint is neuropathy.  He tried a variety of therapies including duloxetine and laser therapy.  He is now trying b12 supplements.   He has also been on gabapentin previously.  He recently started using a tens type of unit for his feet bilaterally.      He denies falls.       He continues to be busy working and traveling to [facility] and more recently [place]    No pain.  No other complaints today.    ROS: 10 point ROS neg other than the symptoms noted above in the HPI.     PHYSICAL EXAMINATION  Wt Readings from Last 4 Encounters:   11/06/23 (!) 152.7 kg (336 lb 11.2 oz)   09/20/23 145.2 kg (320 lb)   05/26/23 (!) 154.2 kg (340 lb)   05/01/23 (!) 157.8 kg (347 lb 14.2 oz)     /82 (BP Location: Right arm, Patient Position: Sitting, Cuff Size: Adult Large)   Pulse 70   Temp 97.7  F (36.5  C) (Oral)   Resp 16   Wt (!) 157 kg (346 lb 1.6 oz)   SpO2 96%   BMI 40.00 kg/m    Vital signs were reviewed.   Patient alert and oriented x3.   PERRLA. EOMI. No scleral icterus noted.   Neck exam: No palpable cervical, supraclavicular or axillary nodes bilaterally.   Heart:Mildly tachy, no murmur   Lungs: clear to auscultation bilaterally.  No crackles or wheezing.   Abd: positive bowel sounds in all four quadrants.  No tenderness to palpation.  No hepatomegaly.     Extremities: No lower extremity edema,  Neuro: grossly intact. Absent reflexes from knees to below.  Mood and affect is stable.       Labs:         Reviewed his labs above today and his CT scan today personally         CT CAP - no evidence of  "recurrent or metastatic disease; reviewed personally by me.  Final report pending.  I did speak with one radiologist.    Lab Results   Component Value Date    WBC 3.5 05/06/2024    WBC 3.4 04/26/2021     Lab Results   Component Value Date    RBC 5.30 05/06/2024    RBC 4.56 04/26/2021     Lab Results   Component Value Date    HGB 15.6 05/06/2024    HGB 13.6 04/26/2021     Lab Results   Component Value Date    HCT 45.2 05/06/2024    HCT 41.5 04/26/2021     No components found for: \"MCT\"  Lab Results   Component Value Date    MCV 85 05/06/2024    MCV 91 04/26/2021     Lab Results   Component Value Date    MCH 29.4 05/06/2024    MCH 29.8 04/26/2021     Lab Results   Component Value Date    MCHC 34.5 05/06/2024    MCHC 32.8 04/26/2021     Lab Results   Component Value Date    RDW 12.7 05/06/2024    RDW 12.7 04/26/2021     Lab Results   Component Value Date     05/06/2024     04/26/2021       Recent Labs   Lab Test 05/06/24  0919 05/06/24  0833 11/06/23  0822   NA  --  139 140   POTASSIUM  --  4.0 4.3   CHLORIDE  --  106 106   CO2  --  22 25   ANIONGAP  --  11 9   GLC  --  121* 117*   BUN  --  21.4 16.9   CR 1.2 1.17 1.30*   NIDHI  --  8.9 9.2     Liver Function Studies -   Recent Labs   Lab Test 05/06/24  0833   PROTTOTAL 7.1   ALBUMIN 4.1   BILITOTAL 0.6   ALKPHOS 95   AST 23   ALT 13       IMPRESSION/PLAN:  1. 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 He had stable disease on Keytruda, for TWO years,  and then s/p chemoradiation to the primary tumor.    He has been off all therapy since 2/2021. I reviewed his CT CAP scan with him and advised that we continue to monitor him off of therapy for now.   I am thrilled with how he is doing.    2. Dysphagia -   He had a repeat endoscopy last fall. The pathology revealed inflammation.  Overall appearance of the endoscopy was suggestive of Barretts esophagus.  We discussed continuing omeprazole.  No new symptoms    3. He has a history " of pulmonary embolus from 2018 and was treated with Xarelto.    4. Neuropathy continues to be his most bothersome complaint.   He has been trying vitamin B12 supplementation.   We discussed referral to neurology to discuss any other possible new treatments.    5. He does have a history of afib; he follows with his pcp.     Kadi Dee MD .      Again, thank you for allowing me to participate in the care of your patient.        Sincerely,        Kadi Dee MD

## 2024-05-06 NOTE — PROGRESS NOTES
HEMATOLOGY/ONCOLOGY PROGRESS NOTE  May 6, 2024    Care team: Dr Dee/Nicole Sutherland PA-C/Mansi Wetzel RN     REASON FOR VISIT: follow-up metastatic esophogeal cancer, received keytruda x 2 years, followed by concurrent chemo XRT (weekly carbo/taxol + XRT), now being monitored off all therapy. 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 His tumor is positive for cytokeratin 20, negative for P63 and CK7, and HER2 is negative. PDL1 + weakly.  He started on FOLFOX (5FU/oxaliplatin) on 5/15/2017. He had a delay in treatment from 9/5-10/2/17 due to work related injury.  He had an excellent response by imaging throughout the summer 2017.    He a mixed response by imaging in late fall 2017.  In January 2018, he was switched to taxol and cyramza - had slight progression and neuropathy and clinical trial became available.   In March 2018, he was started on the [facility] Match Clinical Trial with crizotinib.  (MET amplification) He remained on crizotinib from March - July 2018.   August 2018, he was switched back to taxol/cramza.  In October 2018, he was switched to Keytruda (PD1 overexpressor).              -April 2019, his infusion was held for three weeks, and he was treated with prednisone and Enbrel for grade 3 arthralgias. Keytruda was resumed              -May 2020 he started Actemra (5/14).  This was initially WEEKLY and then in August- moved to QOW (secondary to fatigue and concerns for low WBC).                -August 2020 moved to q6 week Keytruda (still at the 200 mg dose)              -November 2020 moved back to q3w Keytruda  CT 12/30/2020: progression of esophageal mass   1/14/21: PET showing growth of primary tumor only, discussion of radiation   Keytruda was discontinued at that time.  1/28/21: chemoradiation with weekly carbo/taxol and daily radiation.  Completed 3/3/21 received  5000 cGY     INTERVAL HISTORY:   Levi returns for follow up today. No new changes to his health.   He reports no dysphagia.  No n/v/d/c.  He has good energy.  His biggest complaint is neuropathy.  He tried a variety of therapies including duloxetine and laser therapy.  He is now trying b12 supplements.   He has also been on gabapentin previously.  He recently started using a tens type of unit for his feet bilaterally.      He denies falls.       He continues to be busy working and traveling to [facility] and more recently [place]    No pain.  No other complaints today.    ROS: 10 point ROS neg other than the symptoms noted above in the HPI.     PHYSICAL EXAMINATION  Wt Readings from Last 4 Encounters:   11/06/23 (!) 152.7 kg (336 lb 11.2 oz)   09/20/23 145.2 kg (320 lb)   05/26/23 (!) 154.2 kg (340 lb)   05/01/23 (!) 157.8 kg (347 lb 14.2 oz)     /82 (BP Location: Right arm, Patient Position: Sitting, Cuff Size: Adult Large)   Pulse 70   Temp 97.7  F (36.5  C) (Oral)   Resp 16   Wt (!) 157 kg (346 lb 1.6 oz)   SpO2 96%   BMI 40.00 kg/m    Vital signs were reviewed.   Patient alert and oriented x3.   PERRLA. EOMI. No scleral icterus noted.   Neck exam: No palpable cervical, supraclavicular or axillary nodes bilaterally.   Heart:Mildly tachy, no murmur   Lungs: clear to auscultation bilaterally.  No crackles or wheezing.   Abd: positive bowel sounds in all four quadrants.  No tenderness to palpation.  No hepatomegaly.     Extremities: No lower extremity edema,  Neuro: grossly intact. Absent reflexes from knees to below.  Mood and affect is stable.       Labs:         Reviewed his labs above today and his CT scan today personally         CT CAP - no evidence of recurrent or metastatic disease; reviewed personally by me.  Final report pending.  I did speak with one radiologist.    Lab Results   Component Value Date    WBC 3.5 05/06/2024    WBC 3.4 04/26/2021     Lab Results   Component Value Date    RBC  "5.30 05/06/2024    RBC 4.56 04/26/2021     Lab Results   Component Value Date    HGB 15.6 05/06/2024    HGB 13.6 04/26/2021     Lab Results   Component Value Date    HCT 45.2 05/06/2024    HCT 41.5 04/26/2021     No components found for: \"MCT\"  Lab Results   Component Value Date    MCV 85 05/06/2024    MCV 91 04/26/2021     Lab Results   Component Value Date    MCH 29.4 05/06/2024    MCH 29.8 04/26/2021     Lab Results   Component Value Date    MCHC 34.5 05/06/2024    MCHC 32.8 04/26/2021     Lab Results   Component Value Date    RDW 12.7 05/06/2024    RDW 12.7 04/26/2021     Lab Results   Component Value Date     05/06/2024     04/26/2021       Recent Labs   Lab Test 05/06/24  0919 05/06/24  0833 11/06/23  0822   NA  --  139 140   POTASSIUM  --  4.0 4.3   CHLORIDE  --  106 106   CO2  --  22 25   ANIONGAP  --  11 9   GLC  --  121* 117*   BUN  --  21.4 16.9   CR 1.2 1.17 1.30*   NIDHI  --  8.9 9.2     Liver Function Studies -   Recent Labs   Lab Test 05/06/24  0833   PROTTOTAL 7.1   ALBUMIN 4.1   BILITOTAL 0.6   ALKPHOS 95   AST 23   ALT 13       IMPRESSION/PLAN:  1. 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 He had stable disease on Keytruda, for TWO years,  and then s/p chemoradiation to the primary tumor.    He has been off all therapy since 2/2021. I reviewed his CT CAP scan with him and advised that we continue to monitor him off of therapy for now.   I am thrilled with how he is doing.    2. Dysphagia -   He had a repeat endoscopy last fall. The pathology revealed inflammation.  Overall appearance of the endoscopy was suggestive of Barretts esophagus.  We discussed continuing omeprazole.  No new symptoms    3. He has a history of pulmonary embolus from 2018 and was treated with Xarelto.    4. Neuropathy continues to be his most bothersome complaint.   He has been trying vitamin B12 supplementation.   We discussed referral to neurology to discuss any other possible new " treatments.    5. He does have a history of afib; he follows with his pcp.     Kadi Dee MD .

## 2024-05-06 NOTE — NURSING NOTE
"Oncology Rooming Note    May 6, 2024 12:02 PM   Reuben Padilla is a 63 year old male who presents for:    Chief Complaint   Patient presents with    Oncology Clinic Visit     Esophageal cancer     Initial Vitals: /82 (BP Location: Right arm, Patient Position: Sitting, Cuff Size: Adult Large)   Pulse 70   Temp 97.7  F (36.5  C) (Oral)   Resp 16   Wt (!) 157 kg (346 lb 1.6 oz)   SpO2 96%   BMI 40.00 kg/m   Estimated body mass index is 40 kg/m  as calculated from the following:    Height as of 9/20/23: 1.981 m (6' 6\").    Weight as of this encounter: 157 kg (346 lb 1.6 oz). Body surface area is 2.94 meters squared.  No Pain (0) Comment: Data Unavailable   No LMP for male patient.  Allergies reviewed: Yes  Medications reviewed: Yes    Medications: Medication refills not needed today.  Pharmacy name entered into [employer]: [facility] - [facility], [place] [address]    Frailty Screening:   Is the patient here for a new oncology consult visit in cancer care? 2. No      Clinical concerns: none       Barbi Emmanuel              "

## 2024-05-29 ENCOUNTER — TELEPHONE (OUTPATIENT)
Dept: ONCOLOGY | Facility: CLINIC | Age: 64
End: 2024-05-29
Payer: COMMERCIAL

## 2024-05-29 NOTE — TELEPHONE ENCOUNTER
Supplemental Attending Physician Statement  forms received via Fax from [facility].      Forms to be completed and put in folder for provider to approve.    Fax #:  [phone]  Claim: [id number]    Beatriz White

## 2024-06-19 NOTE — TELEPHONE ENCOUNTER
Physician statement forms filled out and put in providers folder for review and signature.      Beatriz White

## 2024-06-20 NOTE — TELEPHONE ENCOUNTER
Physician's Statement paperwork completed, checked for accuracy, signed and faxed to [facility] @ [phone]. A copy was made, sent to scanning and original mailed to patient at home address.    Successful transmission verified in Right Fax.      Lexy Naqvi

## 2024-08-10 ENCOUNTER — HEALTH MAINTENANCE LETTER (OUTPATIENT)
Age: 64
End: 2024-08-10

## 2024-08-16 NOTE — TELEPHONE ENCOUNTER
RECORDS RECEIVED FROM: internal   REASON FOR VISIT: neuropathy    PROVIDER: Dr Ruiz   DATE OF APPT: 8/28/24   NOTES (FOR ALL VISITS) STATUS DETAILS   OFFICE NOTE from referring provider Internal Dr Kadi Dee @ [facility]:  5/6/24 11/6/23   MEDICATION LIST Internal    IMAGING  (FOR ALL VISITS)     MRI (HEAD, NECK, SPINE) N/A    CT (HEAD, NECK, SPINE) N/A

## 2024-08-19 ENCOUNTER — PATIENT OUTREACH (OUTPATIENT)
Dept: ONCOLOGY | Facility: CLINIC | Age: 64
End: 2024-08-19
Payer: COMMERCIAL

## 2024-08-19 NOTE — PROGRESS NOTES
[facility]: Cancer Care                                                                                         Completed chart audit to assign Oncology Care Coordination enrollment status.      Mansi Wetzel MSN, RN, OCN   RN Care Coordinator   [facility]

## 2024-08-28 ENCOUNTER — OFFICE VISIT (OUTPATIENT)
Dept: NEUROLOGY | Facility: CLINIC | Age: 64
End: 2024-08-28
Attending: INTERNAL MEDICINE
Payer: COMMERCIAL

## 2024-08-28 ENCOUNTER — PRE VISIT (OUTPATIENT)
Dept: NEUROLOGY | Facility: CLINIC | Age: 64
End: 2024-08-28

## 2024-08-28 VITALS
DIASTOLIC BLOOD PRESSURE: 77 MMHG | WEIGHT: 315 LBS | OXYGEN SATURATION: 97 % | BODY MASS INDEX: 39.87 KG/M2 | SYSTOLIC BLOOD PRESSURE: 110 MMHG | HEART RATE: 73 BPM

## 2024-08-28 DIAGNOSIS — C79.9 METASTASIS FROM GASTRIC CANCER (H): ICD-10-CM

## 2024-08-28 DIAGNOSIS — G62.9 NEUROPATHY: ICD-10-CM

## 2024-08-28 DIAGNOSIS — C16.9 METASTASIS FROM GASTRIC CANCER (H): ICD-10-CM

## 2024-08-28 PROCEDURE — 99204 OFFICE O/P NEW MOD 45 MIN: CPT | Performed by: PSYCHIATRY & NEUROLOGY

## 2024-08-28 RX ORDER — CAPSAICIN 0.025 %
1 CREAM (GRAM) TOPICAL 3 TIMES DAILY
Qty: 50 G | Refills: 3 | Status: SHIPPED | OUTPATIENT
Start: 2024-08-28

## 2024-08-28 NOTE — LETTER
8/28/2024       RE: Reuben Padilla  [id number]  [place] [address]     Dear Colleague,    Thank you for referring your patient, Reuben Padilla, to the [facility] [place] at [facility]. Please see a copy of my visit note below.    Chief Complaint: chemotherapy induced neuropathy    History of Present Illness:    Reuben Padilla is a 63 year old man who I am seeing at the request of Dr. Dee for neuropathy evaluation. He has a history of metastatic esophogeal cancer treated with FOLFOX (5FU/oxaliplatin) in 2017, taxol and cyramza in 2018, keytruda 1430-8800, chemoradiation 2021, carbo/taxol 2021. He has been off all chemotherapy since 2021 and his esophageal cancer has been in remission.     Onset of his neuropathic symptoms was 2017 during the first chemotherapy course of oxaliplatin. His symptoms including numbness, paresthesias, burning and shooting pain. Onset was symmetric in the feet, and spread up to his knees. He does not recall neuropathy worsening during the keytruda period. He also does not call worsening symptoms in 2021 with carbo/taxol. At the peak neuropathy severity his balance was affected and his hands felt weak. Over the last several years he has not felt that the intensity of the symptoms in his feet has improved. His balance is a bit better, but still a little off. His hands are not affected at this point.     Prior symptomatic medications include gabapentin in doses of to 2700 mg daily (divided). He took it for years. He did not find it helpful at all. He took duloxetine for a couple months. It was not helpful. He took B12 for a while. His B12 is not been low. He also took an OTC supplement called nerve shield (a combination of B1, B6, B12  mg daily). He has not tried any topical interventions.     Prior pertinent laboratory work-up:  10/6/22: Normal B12, TSH  7/26/24: Hba1c 5.3    Prior electrophysiologic  work-up:  None    Past Medical History:   Current Outpatient Medications   Medication Sig Dispense Refill     Cyanocobalamin 5000 MCG SUBL Place 2 tablets under the tongue daily       latanoprost (XALATAN) 0.005 % ophthalmic solution Place 1 drop into both eyes At Bedtime 1 Bottle 0     multivitamin, therapeutic (THERA-VIT) TABS tablet Take 1 tablet by mouth daily       triamterene-HCTZ (MAXZIDE-25) 37.5-25 MG tablet Take 1 tablet by mouth daily 90 tablet 3     No current facility-administered medications for this visit.     Facility-Administered Medications Ordered in Other Visits   Medication Dose Route Frequency Provider Last Rate Last Admin     sod bicarbonate-citric acid-simethicone (EZ GAS) 2.21-1.53-0.04 g packet 4 g  4 g Oral Once Justin Yao MD         sodium chloride 0.9 % bag 500mL for CT scan flush use  84 mL Intravenous Once Donna Worthington MD         Past Surgical History:  Past Surgical History:   Procedure Laterality Date     AMPUTATION      toe     ARTHROSCOPY KNEE       bunion surgery       CHOLECYSTECTOMY       COLONOSCOPY  02/2017    remove 2 polyps     COLONOSCOPY N/A 9/20/2023    Procedure: Colonoscopy;  Surgeon: Andrew Zhou MD;  Location:  GI     ESOPHAGOSCOPY, GASTROSCOPY, DUODENOSCOPY (EGD), COMBINED N/A 10/10/2018    Procedure: COMBINED ESOPHAGOSCOPY, GASTROSCOPY, DUODENOSCOPY (EGD);  Esophagogastroduodenoscopy ;  Surgeon: Leonel Joel MD;  Location:  OR     ESOPHAGOSCOPY, GASTROSCOPY, DUODENOSCOPY (EGD), COMBINED N/A 8/12/2021    Procedure: ESOPHAGOGASTRODUODENOSCOPY, WITH BIOPSY;  Surgeon: Sadia Reed MD;  [facility] [facility] [place]     ESOPHAGOSCOPY, GASTROSCOPY, DUODENOSCOPY (EGD), COMBINED N/A 11/10/2021    Procedure: ESOPHAGOGASTRODUODENOSCOPY, WITH BIOPSY;  Surgeon: Leventhal, Thomas Michael, MD;  Location: [facility]     ESOPHAGOSCOPY, GASTROSCOPY, DUODENOSCOPY (EGD), COMBINED N/A 10/5/2022    Procedure: ESOPHAGOGASTRODUODENOSCOPY (EGD);  Surgeon: Arnold Guzman  MD HALLIE;  Location:  GI     ESOPHAGOSCOPY, GASTROSCOPY, DUODENOSCOPY (EGD), COMBINED N/A 11/16/2022    Procedure: ESOPHAGOGASTRODUODENOSCOPY, WITH BIOPSY;  Surgeon: Dao Bhakta MD;  Location:  GI     INSERT PORT VASCULAR ACCESS Right 5/9/2017    Procedure: INSERT PORT VASCULAR ACCESS;  Single Lumen Chest Power Port;  Surgeon: Jasiel Hu PA-C;  Location: [facility] OR     IR PORT REMOVAL RIGHT  5/26/2023     REMOVE PORT VASCULAR ACCESS Right 5/26/2023    Procedure: Remove port vascular access;  Surgeon: Gonzalez Aggarwal MD;  Location: [facility] OR     Family history:    There is no known family history of hereditary neuropathies or other neuromuscular disorders.    Social History:    He denies tobacco, alcohol, or illicit drug use.     Medical Allergies:    Allergies   Allergen Reactions     Penicillin G Sodium Unknown     Penicillin G      Current Medications:   Current Outpatient Medications   Medication Sig Dispense Refill     Cyanocobalamin 5000 MCG SUBL Place 2 tablets under the tongue daily       latanoprost (XALATAN) 0.005 % ophthalmic solution Place 1 drop into both eyes At Bedtime 1 Bottle 0     multivitamin, therapeutic (THERA-VIT) TABS tablet Take 1 tablet by mouth daily       triamterene-HCTZ (MAXZIDE-25) 37.5-25 MG tablet Take 1 tablet by mouth daily 90 tablet 3     No current facility-administered medications for this visit.     Facility-Administered Medications Ordered in Other Visits   Medication Dose Route Frequency Provider Last Rate Last Admin     sod bicarbonate-citric acid-simethicone (EZ GAS) 2.21-1.53-0.04 g packet 4 g  4 g Oral Once Justin Yao MD         sodium chloride 0.9 % bag 500mL for CT scan flush use  84 mL Intravenous Once Donna Worthington MD         Review of Systems: A complete review of systems was obtained and was negative except for what was noted above.    Physical examination:    /77 (BP Location: Right arm, Patient Position: Sitting, Cuff Size:  "Adult Large)   Pulse 73   Wt (!) 156.5 kg (345 lb)   SpO2 97%   BMI 39.87 kg/m       General Appearance: NAD    Skin: There are no rashes or other skin lesions.    Musculoskeletal:  There is no scoliosis, lordosis, kyphosis, pes cavus, or hammertoes.    Neurologic examination:    Mental status:  Patient is alert, attentive, and oriented x 3.  Language is coherent and fluent without aphasia.  Memory, comprehension and ability to follow commands were intact.       Cranial nerves:  Pupils were round and reacted to light.  Extraocular movements were full. There was no face, jaw, palate or tongue weakness or atrophy. Hearing was grossly intact.  Shoulder shrug was normal.       Motor exam: No atrophy or fasciculations.  Manual muscle testing revealed MRC grade 5/5 strength throughout including proximal and distal muscles of the arms and legs.    Complex motor skills: No tremor or ataxia    Sensory exam: Vibration absent in toes, educed at ankles. Pin reduced in the legs below knee becoming more dense distally.     Gait: Narrow and stable.      Deep tendon reflexes:   Right Left   Triceps 2 2   Biceps 2 2   Brachioradialis 2 2   Knee jerk 2 2   Ankle jerk 0 0     Assessment:    Reuben Padilla is a 63 year old man with a chemotherapy (oxaliplatin) induced polyneuropathy (ANALI). Certainly neuropathy associated with chemotherapy can be frustrating to manage. He has tried and failed several conventional management strategies. I was up front with him that at this point it is unlikely that we will find a \"magic bullet\" to treat his neuropathic symptoms. As the initial injury was in 2017, it is also unlikely that further nerve regeneration/repair is possible at this point (typically ceases after 18-24 months). He understands this limitations. I did offer him a few thoughts on interventions that may be helpful to lessen the symptom burden, as detailed below. All questions answered as best I was able.     Plan:      1. " "Topical:  - Capsicin cream 0.25%. Prescription provided. Apply to feet and lower legs TID. Advised that burning may worsening before it gets better. If no improvement in 2 weeks then he can stop then cream. If it is helpful then he can continue it 2-3 times daily  - Lidocaine patches 4% OTC may be helpful for breakthrough sharp pains. I advised he have some available at home if needed.   - Warm water foot/leg soak for 15 minutes night with a crushed baby Asprin can be helpful to some patients.   2. Oral:   - Try  mg TID. He did take some ALA as part of \"nerve shield\", but underdosed. Try 200 mg TID for a 1-2 months to see if it helps  - Considering his prior experience with gabapentin and duloxetine I am not confident that other AEDs or antidepressant medications would be beneficial.   3. Encouraged supportive/comfortable foot wear and frequent foot inspection  4. I have not arranged follow up with him, but would be happy to discuss further should his symptoms change   ---      Again, thank you for allowing me to participate in the care of your patient.      Sincerely,    Dao Ruiz MD    "

## 2024-08-28 NOTE — PROGRESS NOTES
Chief Complaint: chemotherapy induced neuropathy    History of Present Illness:    Reuben Padilla is a 63 year old man who I am seeing at the request of Dr. Dee for neuropathy evaluation. He has a history of metastatic esophogeal cancer treated with FOLFOX (5FU/oxaliplatin) in 2017, taxol and cyramza in 2018, keytruda 0438-0504, chemoradiation 2021, carbo/taxol 2021. He has been off all chemotherapy since 2021 and his esophageal cancer has been in remission.     Onset of his neuropathic symptoms was 2017 during the first chemotherapy course of oxaliplatin. His symptoms including numbness, paresthesias, burning and shooting pain. Onset was symmetric in the feet, and spread up to his knees. He does not recall neuropathy worsening during the keytruda period. He also does not call worsening symptoms in 2021 with carbo/taxol. At the peak neuropathy severity his balance was affected and his hands felt weak. Over the last several years he has not felt that the intensity of the symptoms in his feet has improved. His balance is a bit better, but still a little off. His hands are not affected at this point.     Prior symptomatic medications include gabapentin in doses of to 2700 mg daily (divided). He took it for years. He did not find it helpful at all. He took duloxetine for a couple months. It was not helpful. He took B12 for a while. His B12 is not been low. He also took an OTC supplement called nerve shield (a combination of B1, B6, B12  mg daily). He has not tried any topical interventions.     Prior pertinent laboratory work-up:  10/6/22: Normal B12, TSH  7/26/24: Hba1c 5.3    Prior electrophysiologic work-up:  None    Past Medical History:   Current Outpatient Medications   Medication Sig Dispense Refill    Cyanocobalamin 5000 MCG SUBL Place 2 tablets under the tongue daily      latanoprost (XALATAN) 0.005 % ophthalmic solution Place 1 drop into both eyes At Bedtime 1 Bottle 0    multivitamin, therapeutic  (THERA-VIT) TABS tablet Take 1 tablet by mouth daily      triamterene-HCTZ (MAXZIDE-25) 37.5-25 MG tablet Take 1 tablet by mouth daily 90 tablet 3     No current facility-administered medications for this visit.     Facility-Administered Medications Ordered in Other Visits   Medication Dose Route Frequency Provider Last Rate Last Admin    sod bicarbonate-citric acid-simethicone (EZ GAS) 2.21-1.53-0.04 g packet 4 g  4 g Oral Once Justin Yao MD        sodium chloride 0.9 % bag 500mL for CT scan flush use  84 mL Intravenous Once Donna Worthington MD         Past Surgical History:  Past Surgical History:   Procedure Laterality Date    AMPUTATION      toe    ARTHROSCOPY KNEE      bunion surgery      CHOLECYSTECTOMY      COLONOSCOPY  02/2017    remove 2 polyps    COLONOSCOPY N/A 9/20/2023    Procedure: Colonoscopy;  Surgeon: Andrew Zhou MD;  Location:  GI    ESOPHAGOSCOPY, GASTROSCOPY, DUODENOSCOPY (EGD), COMBINED N/A 10/10/2018    Procedure: COMBINED ESOPHAGOSCOPY, GASTROSCOPY, DUODENOSCOPY (EGD);  Esophagogastroduodenoscopy ;  Surgeon: Leonel Joel MD;  Location:  OR    ESOPHAGOSCOPY, GASTROSCOPY, DUODENOSCOPY (EGD), COMBINED N/A 8/12/2021    Procedure: ESOPHAGOGASTRODUODENOSCOPY, WITH BIOPSY;  Surgeon: Sadia Reed MD;  Location: [facility] [place] OR    ESOPHAGOSCOPY, GASTROSCOPY, DUODENOSCOPY (EGD), COMBINED N/A 11/10/2021    Procedure: ESOPHAGOGASTRODUODENOSCOPY, WITH BIOPSY;  Surgeon: Leventhal, Thomas Michael, MD;  Location: [facility] [place] OR    ESOPHAGOSCOPY, GASTROSCOPY, DUODENOSCOPY (EGD), COMBINED N/A 10/5/2022    Procedure: ESOPHAGOGASTRODUODENOSCOPY (EGD);  Surgeon: Arnold Guzman MD;  Location:  GI    ESOPHAGOSCOPY, GASTROSCOPY, DUODENOSCOPY (EGD), COMBINED N/A 11/16/2022    Procedure: ESOPHAGOGASTRODUODENOSCOPY, WITH BIOPSY;  Surgeon: Dao Bhakta MD;  Location:  GI    INSERT PORT VASCULAR ACCESS Right 5/9/2017    Procedure: INSERT PORT VASCULAR ACCESS;  Single Lumen Chest Power  Port;  Surgeon: Jasiel Hu PA-C;  Location: [facility] OR    IR PORT REMOVAL RIGHT  5/26/2023    REMOVE PORT VASCULAR ACCESS Right 5/26/2023    Procedure: Remove port vascular access;  Surgeon: Gonzalez Aggarwal MD;  [facility]     Family history:    There is no known family history of hereditary neuropathies or other neuromuscular disorders.    Social History:    He denies tobacco, alcohol, or illicit drug use.     Medical Allergies:    Allergies   Allergen Reactions    Penicillin G Sodium Unknown    Penicillin G      Current Medications:   Current Outpatient Medications   Medication Sig Dispense Refill    Cyanocobalamin 5000 MCG SUBL Place 2 tablets under the tongue daily      latanoprost (XALATAN) 0.005 % ophthalmic solution Place 1 drop into both eyes At Bedtime 1 Bottle 0    multivitamin, therapeutic (THERA-VIT) TABS tablet Take 1 tablet by mouth daily      triamterene-HCTZ (MAXZIDE-25) 37.5-25 MG tablet Take 1 tablet by mouth daily 90 tablet 3     No current facility-administered medications for this visit.     Facility-Administered Medications Ordered in Other Visits   Medication Dose Route Frequency Provider Last Rate Last Admin    sod bicarbonate-citric acid-simethicone (EZ GAS) 2.21-1.53-0.04 g packet 4 g  4 g Oral Once Justin Yao MD        sodium chloride 0.9 % bag 500mL for CT scan flush use  84 mL Intravenous Once Donna Worthington MD         Review of Systems: A complete review of systems was obtained and was negative except for what was noted above.    Physical examination:    /77 (BP Location: Right arm, Patient Position: Sitting, Cuff Size: Adult Large)   Pulse 73   Wt (!) 156.5 kg (345 lb)   SpO2 97%   BMI 39.87 kg/m       General Appearance: NAD    Skin: There are no rashes or other skin lesions.    Musculoskeletal:  There is no scoliosis, lordosis, kyphosis, pes cavus, or hammertoes.    Neurologic examination:    Mental status:  Patient is alert, attentive, and  "oriented x 3.  Language is coherent and fluent without aphasia.  Memory, comprehension and ability to follow commands were intact.       Cranial nerves:  Pupils were round and reacted to light.  Extraocular movements were full. There was no face, jaw, palate or tongue weakness or atrophy. Hearing was grossly intact.  Shoulder shrug was normal.       Motor exam: No atrophy or fasciculations.  Manual muscle testing revealed MRC grade 5/5 strength throughout including proximal and distal muscles of the arms and legs.    Complex motor skills: No tremor or ataxia    Sensory exam: Vibration absent in toes, educed at ankles. Pin reduced in the legs below knee becoming more dense distally.     Gait: Narrow and stable.      Deep tendon reflexes:   Right Left   Triceps 2 2   Biceps 2 2   Brachioradialis 2 2   Knee jerk 2 2   Ankle jerk 0 0     Assessment:    Reuben Padilla is a 63 year old man with a chemotherapy (oxaliplatin) induced polyneuropathy (ANALI). Certainly neuropathy associated with chemotherapy can be frustrating to manage. He has tried and failed several conventional management strategies. I was up front with him that at this point it is unlikely that we will find a \"magic bullet\" to treat his neuropathic symptoms. As the initial injury was in 2017, it is also unlikely that further nerve regeneration/repair is possible at this point (typically ceases after 18-24 months). He understands this limitations. I did offer him a few thoughts on interventions that may be helpful to lessen the symptom burden, as detailed below. All questions answered as best I was able.     Plan:      1. Topical:  - Capsicin cream 0.25%. Prescription provided. Apply to feet and lower legs TID. Advised that burning may worsening before it gets better. If no improvement in 2 weeks then he can stop then cream. If it is helpful then he can continue it 2-3 times daily  - Lidocaine patches 4% OTC may be helpful for breakthrough sharp pains. I " "advised he have some available at home if needed.   - Warm water foot/leg soak for 15 minutes night with a crushed baby Asprin can be helpful to some patients.   2. Oral:   - Try  mg TID. He did take some ALA as part of \"nerve shield\", but underdosed. Try 200 mg TID for a 1-2 months to see if it helps  - Considering his prior experience with gabapentin and duloxetine I am not confident that other AEDs or antidepressant medications would be beneficial.   3. Encouraged supportive/comfortable foot wear and frequent foot inspection  4. I have not arranged follow up with him, but would be happy to discuss further should his symptoms change   ---  "

## 2024-11-18 ENCOUNTER — ONCOLOGY VISIT (OUTPATIENT)
Dept: ONCOLOGY | Facility: CLINIC | Age: 64
End: 2024-11-18
Attending: INTERNAL MEDICINE
Payer: COMMERCIAL

## 2024-11-18 ENCOUNTER — LAB (OUTPATIENT)
Dept: LAB | Facility: CLINIC | Age: 64
End: 2024-11-18
Attending: INTERNAL MEDICINE
Payer: COMMERCIAL

## 2024-11-18 ENCOUNTER — ANCILLARY PROCEDURE (OUTPATIENT)
Dept: CT IMAGING | Facility: CLINIC | Age: 64
End: 2024-11-18
Attending: INTERNAL MEDICINE
Payer: COMMERCIAL

## 2024-11-18 VITALS
HEIGHT: 78 IN | TEMPERATURE: 97.6 F | DIASTOLIC BLOOD PRESSURE: 76 MMHG | HEART RATE: 74 BPM | SYSTOLIC BLOOD PRESSURE: 108 MMHG | OXYGEN SATURATION: 97 % | WEIGHT: 315 LBS | RESPIRATION RATE: 16 BRPM | BODY MASS INDEX: 36.45 KG/M2

## 2024-11-18 DIAGNOSIS — C16.9 METASTASIS FROM GASTRIC CANCER (H): ICD-10-CM

## 2024-11-18 DIAGNOSIS — C79.9 METASTASIS FROM GASTRIC CANCER (H): ICD-10-CM

## 2024-11-18 DIAGNOSIS — R13.10 DYSPHAGIA, UNSPECIFIED TYPE: ICD-10-CM

## 2024-11-18 DIAGNOSIS — I10 HYPERTENSION, UNSPECIFIED TYPE: ICD-10-CM

## 2024-11-18 DIAGNOSIS — G62.9 NEUROPATHY: Primary | ICD-10-CM

## 2024-11-18 LAB
ALBUMIN SERPL BCG-MCNC: 4.1 G/DL (ref 3.5–5.2)
ALP SERPL-CCNC: 87 U/L (ref 40–150)
ALT SERPL W P-5'-P-CCNC: 13 U/L (ref 0–70)
ANION GAP SERPL CALCULATED.3IONS-SCNC: 11 MMOL/L (ref 7–15)
AST SERPL W P-5'-P-CCNC: 19 U/L (ref 0–45)
BASOPHILS # BLD AUTO: 0 10E3/UL (ref 0–0.2)
BASOPHILS NFR BLD AUTO: 1 %
BILIRUB SERPL-MCNC: 0.8 MG/DL
BUN SERPL-MCNC: 25.8 MG/DL (ref 8–23)
CALCIUM SERPL-MCNC: 9.1 MG/DL (ref 8.8–10.4)
CHLORIDE SERPL-SCNC: 102 MMOL/L (ref 98–107)
CREAT BLD-MCNC: 1.5 MG/DL (ref 0.7–1.3)
CREAT SERPL-MCNC: 1.33 MG/DL (ref 0.67–1.17)
EGFRCR SERPLBLD CKD-EPI 2021: 52 ML/MIN/1.73M2
EGFRCR SERPLBLD CKD-EPI 2021: 60 ML/MIN/1.73M2
EOSINOPHIL # BLD AUTO: 0 10E3/UL (ref 0–0.7)
EOSINOPHIL NFR BLD AUTO: 1 %
ERYTHROCYTE [DISTWIDTH] IN BLOOD BY AUTOMATED COUNT: 12.1 % (ref 10–15)
GLUCOSE SERPL-MCNC: 107 MG/DL (ref 70–99)
HCO3 SERPL-SCNC: 22 MMOL/L (ref 22–29)
HCT VFR BLD AUTO: 47.3 % (ref 40–53)
HGB BLD-MCNC: 16.4 G/DL (ref 13.3–17.7)
IMM GRANULOCYTES # BLD: 0 10E3/UL
IMM GRANULOCYTES NFR BLD: 0 %
LYMPHOCYTES # BLD AUTO: 0.5 10E3/UL (ref 0.8–5.3)
LYMPHOCYTES NFR BLD AUTO: 15 %
MCH RBC QN AUTO: 29.3 PG (ref 26.5–33)
MCHC RBC AUTO-ENTMCNC: 34.7 G/DL (ref 31.5–36.5)
MCV RBC AUTO: 85 FL (ref 78–100)
MONOCYTES # BLD AUTO: 0.2 10E3/UL (ref 0–1.3)
MONOCYTES NFR BLD AUTO: 6 %
NEUTROPHILS # BLD AUTO: 2.5 10E3/UL (ref 1.6–8.3)
NEUTROPHILS NFR BLD AUTO: 77 %
NRBC # BLD AUTO: 0 10E3/UL
NRBC BLD AUTO-RTO: 0 /100
PLATELET # BLD AUTO: 145 10E3/UL (ref 150–450)
POTASSIUM SERPL-SCNC: 4.2 MMOL/L (ref 3.4–5.3)
PROT SERPL-MCNC: 7.4 G/DL (ref 6.4–8.3)
RBC # BLD AUTO: 5.6 10E6/UL (ref 4.4–5.9)
SODIUM SERPL-SCNC: 135 MMOL/L (ref 135–145)
WBC # BLD AUTO: 3.3 10E3/UL (ref 4–11)

## 2024-11-18 PROCEDURE — 36415 COLL VENOUS BLD VENIPUNCTURE: CPT | Performed by: PATHOLOGY

## 2024-11-18 PROCEDURE — 85025 COMPLETE CBC W/AUTO DIFF WBC: CPT | Performed by: PATHOLOGY

## 2024-11-18 PROCEDURE — G0463 HOSPITAL OUTPT CLINIC VISIT: HCPCS | Performed by: INTERNAL MEDICINE

## 2024-11-18 PROCEDURE — 99214 OFFICE O/P EST MOD 30 MIN: CPT | Performed by: INTERNAL MEDICINE

## 2024-11-18 RX ORDER — IOPAMIDOL 755 MG/ML
149 INJECTION, SOLUTION INTRAVASCULAR ONCE
Status: COMPLETED | OUTPATIENT
Start: 2024-11-18 | End: 2024-11-18

## 2024-11-18 RX ADMIN — IOPAMIDOL 149 ML: 755 INJECTION, SOLUTION INTRAVASCULAR at 09:31

## 2024-11-18 ASSESSMENT — PAIN SCALES - GENERAL: PAINLEVEL_OUTOF10: NO PAIN (0)

## 2024-11-18 NOTE — NURSING NOTE
"Oncology Rooming Note    November 18, 2024 12:29 PM   Reuben Padilla is a 64 year old male who presents for:    Chief Complaint   Patient presents with    Oncology Clinic Visit     Esophageal Cancer     Initial Vitals: /76 (BP Location: Left arm, Patient Position: Sitting, Cuff Size: Adult Large)   Pulse 74   Temp 97.6  F (36.4  C) (Oral)   Resp 16   Ht 1.981 m (6' 6\")   Wt 146.1 kg (322 lb 3.2 oz)   SpO2 97%   BMI 37.23 kg/m   Estimated body mass index is 37.23 kg/m  as calculated from the following:    Height as of this encounter: 1.981 m (6' 6\").    Weight as of this encounter: 146.1 kg (322 lb 3.2 oz). Body surface area is 2.84 meters squared.  No Pain (0) Comment: Data Unavailable   No LMP for male patient.  Allergies reviewed: Yes  Medications reviewed: Yes    Medications: Medication refills not needed today.  Pharmacy name entered into [facility]: [email or web address] - [address]    Frailty Screening:   Is the patient here for a new oncology consult visit in cancer care? 2. No      Clinical concerns: None       Mandie Chi LPN  11/18/2024                "

## 2024-11-18 NOTE — LETTER
11/18/2024      Reuben Padilla  [address]  [place] [address]      Dear Colleague,    Thank you for referring your patient, Reuben Padilla, to the [facility]. Please see a copy of my visit note below.    HEMATOLOGY/ONCOLOGY PROGRESS NOTE  Nov 18, 2024    Care team: Dr Dee/Nicole Sutherland PA-C/Mansi Wetzel RN     REASON FOR VISIT: follow-up metastatic esophogeal cancer, received keytruda x 2 years, followed by concurrent chemo XRT (weekly carbo/taxol + XRT), now being monitored off all therapy. 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 His tumor is positive for cytokeratin 20, negative for P63 and CK7, and HER2 is negative. PDL1 + weakly.  He started on FOLFOX (5FU/oxaliplatin) on 5/15/2017. He had a delay in treatment from 9/5-10/2/17 due to work related injury.  He had an excellent response by imaging throughout the summer 2017.    He a mixed response by imaging in late fall 2017.  In January 2018, he was switched to taxol and cyramza - had slight progression and neuropathy and clinical trial became available.   In March 2018, he was started on the [employer] Match Clinical Trial with crizotinib.  (MET amplification) He remained on crizotinib from March - July 2018.   August 2018, he was switched back to taxol/cramza.  In October 2018, he was switched to Keytruda (PD1 overexpressor).              -April 2019, his infusion was held for three weeks, and he was treated with prednisone and Enbrel for grade 3 arthralgias. Keytruda was resumed              -May 2020 he started Actemra (5/14).  This was initially WEEKLY and then in August- moved to QOW (secondary to fatigue and concerns for low WBC).                -August 2020 moved to q6 week Keytruda (still at the 200 mg dose)              -November 2020 moved back to q3w Keytruda  CT 12/30/2020: progression  "of esophageal mass   1/14/21: PET showing growth of primary tumor only, discussion of radiation   Keytruda was discontinued at that time.  1/28/21: chemoradiation with weekly carbo/taxol and daily radiation.  Completed 3/3/21 received 5000 cGY     INTERVAL HISTORY:   Levi returns for follow up today. No new changes to his health.   He reports no dysphagia.  No n/v/d/c.  He has good energy.  His biggest complaint is neuropathy.  He tried a variety of therapies including duloxetine and laser therapy.  He is now trying b12 supplements.   He has also been on gabapentin previously.  He recently started using a tens type of unit for his feet bilaterally.  More recently he has been using RPR therapy and will and red light therapy.  He does not know if this has helped at all yet.  He is also using vibratory therapy.    He feels good otherwise.  He has no new medical history.     ROS: 10 point ROS neg other than the symptoms noted above in the HPI.     PHYSICAL EXAMINATION  Wt Readings from Last 4 Encounters:   08/28/24 (!) 156.5 kg (345 lb)   05/06/24 (!) 157 kg (346 lb 1.6 oz)   11/06/23 (!) 152.7 kg (336 lb 11.2 oz)   09/20/23 145.2 kg (320 lb)     /76 (BP Location: Left arm, Patient Position: Sitting, Cuff Size: Adult Large)   Pulse 74   Temp 97.6  F (36.4  C) (Oral)   Resp 16   Ht 1.981 m (6' 6\")   Wt 146.1 kg (322 lb 3.2 oz)   SpO2 97%   BMI 37.23 kg/m    Vital signs were reviewed.   Patient alert and oriented x3.   PERRLA. EOMI. No scleral icterus noted.   Neck exam: No palpable cervical, supraclavicular or axillary nodes bilaterally.   Heart:Mildly tachy, no murmur   Lungs: clear to auscultation bilaterally.  No crackles or wheezing.   Abd: positive bowel sounds in all four quadrants.  No tenderness to palpation.  No hepatomegaly.     Extremities: No lower extremity edema,  Neuro: grossly intact. Absent reflexes from knees to below.  Mood and affect is stable.       Labs:         Reviewed his labs above " "today and his CT scan today personally  I discussed them with radiology.  Preliminary report: stable with no concerning findings    Lab Results   Component Value Date    WBC 3.3 11/18/2024    WBC 3.4 04/26/2021     Lab Results   Component Value Date    RBC 5.60 11/18/2024    RBC 4.56 04/26/2021     Lab Results   Component Value Date    HGB 16.4 11/18/2024    HGB 13.6 04/26/2021     Lab Results   Component Value Date    HCT 47.3 11/18/2024    HCT 41.5 04/26/2021     No components found for: \"MCT\"  Lab Results   Component Value Date    MCV 85 11/18/2024    MCV 91 04/26/2021     Lab Results   Component Value Date    MCH 29.3 11/18/2024    MCH 29.8 04/26/2021     Lab Results   Component Value Date    MCHC 34.7 11/18/2024    MCHC 32.8 04/26/2021     Lab Results   Component Value Date    RDW 12.1 11/18/2024    RDW 12.7 04/26/2021     Lab Results   Component Value Date     11/18/2024     04/26/2021       Chem8  Liver Function Studies -   Recent Labs   Lab Test 11/18/24  0858   PROTTOTAL 7.4   ALBUMIN 4.1   BILITOTAL 0.8   ALKPHOS 87   AST 19   ALT 13       IMPRESSION/PLAN:  1. 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 He had stable disease on Keytruda, for TWO years,  and then s/p chemoradiation to the primary tumor.    He has been off all therapy since 2/2021. I reviewed his CT CAP scan with him and advised that we continue to monitor him off of therapy for now.   I am thrilled with how he is doing.    Return in 6 months    2. Dysphagia -   He had a repeat endoscopy fall 2022. The pathology revealed inflammation.  Overall appearance of the endoscopy was suggestive of Barretts esophagus.  We discussed continuing omeprazole.  No new symptoms.      3. He has a history of pulmonary embolus from 2018 and was treated with Xarelto.    4. Neuropathy continues to be his most bothersome complaint.   He has tried many therapies and is trying some naturopathic redemies.      5. He does " have a history of afib; he follows with his pcp.           Again, thank you for allowing me to participate in the care of your patient.      Sincerely,    Kadi Dee MD

## 2024-11-18 NOTE — DISCHARGE INSTRUCTIONS

## 2024-11-18 NOTE — PROGRESS NOTES
HEMATOLOGY/ONCOLOGY PROGRESS NOTE  Nov 18, 2024    Care team: Dr Dee/Nicole Sutherland PA-C/Mansi Wetzel RN     REASON FOR VISIT: follow-up metastatic esophogeal cancer, received keytruda x 2 years, followed by concurrent chemo XRT (weekly carbo/taxol + XRT), now being monitored off all therapy. 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 His tumor is positive for cytokeratin 20, negative for P63 and CK7, and HER2 is negative. PDL1 + weakly.  He started on FOLFOX (5FU/oxaliplatin) on 5/15/2017. He had a delay in treatment from 9/5-10/2/17 due to work related injury.  He had an excellent response by imaging throughout the summer 2017.    He a mixed response by imaging in late fall 2017.  In January 2018, he was switched to taxol and cyramza - had slight progression and neuropathy and clinical trial became available.   In March 2018, he was started on the [facility] Match Clinical Trial with crizotinib.  (MET amplification) He remained on crizotinib from March - July 2018.   August 2018, he was switched back to taxol/cramza.  In October 2018, he was switched to Keytruda (PD1 overexpressor).              -April 2019, his infusion was held for three weeks, and he was treated with prednisone and Enbrel for grade 3 arthralgias. Keytruda was resumed              -May 2020 he started Actemra (5/14).  This was initially WEEKLY and then in August- moved to QOW (secondary to fatigue and concerns for low WBC).                -August 2020 moved to q6 week Keytruda (still at the 200 mg dose)              -November 2020 moved back to q3w Keytruda  CT 12/30/2020: progression of esophageal mass   1/14/21: PET showing growth of primary tumor only, discussion of radiation   Keytruda was discontinued at that time.  1/28/21: chemoradiation with weekly carbo/taxol and daily radiation.  Completed 3/3/21 received  "5000 cGY     INTERVAL HISTORY:   Levi returns for follow up today. No new changes to his health.   He reports no dysphagia.  No n/v/d/c.  He has good energy.  His biggest complaint is neuropathy.  He tried a variety of therapies including duloxetine and laser therapy.  He is now trying b12 supplements.   He has also been on gabapentin previously.  He recently started using a tens type of unit for his feet bilaterally.  More recently he has been using RPR therapy and will and red light therapy.  He does not know if this has helped at all yet.  He is also using vibratory therapy.    He feels good otherwise.  He has no new medical history.     ROS: 10 point ROS neg other than the symptoms noted above in the HPI.     PHYSICAL EXAMINATION  Wt Readings from Last 4 Encounters:   08/28/24 (!) 156.5 kg (345 lb)   05/06/24 (!) 157 kg (346 lb 1.6 oz)   11/06/23 (!) 152.7 kg (336 lb 11.2 oz)   09/20/23 145.2 kg (320 lb)     /76 (BP Location: Left arm, Patient Position: Sitting, Cuff Size: Adult Large)   Pulse 74   Temp 97.6  F (36.4  C) (Oral)   Resp 16   Ht 1.981 m (6' 6\")   Wt 146.1 kg (322 lb 3.2 oz)   SpO2 97%   BMI 37.23 kg/m    Vital signs were reviewed.   Patient alert and oriented x3.   PERRLA. EOMI. No scleral icterus noted.   Neck exam: No palpable cervical, supraclavicular or axillary nodes bilaterally.   Heart:Mildly tachy, no murmur   Lungs: clear to auscultation bilaterally.  No crackles or wheezing.   Abd: positive bowel sounds in all four quadrants.  No tenderness to palpation.  No hepatomegaly.     Extremities: No lower extremity edema,  Neuro: grossly intact. Absent reflexes from knees to below.  Mood and affect is stable.       Labs:         Reviewed his labs above today and his CT scan today personally  I discussed them with radiology.  Preliminary report: stable with no concerning findings    Lab Results   Component Value Date    WBC 3.3 11/18/2024    WBC 3.4 04/26/2021     Lab Results " "  Component Value Date    RBC 5.60 11/18/2024    RBC 4.56 04/26/2021     Lab Results   Component Value Date    HGB 16.4 11/18/2024    HGB 13.6 04/26/2021     Lab Results   Component Value Date    HCT 47.3 11/18/2024    HCT 41.5 04/26/2021     No components found for: \"MCT\"  Lab Results   Component Value Date    MCV 85 11/18/2024    MCV 91 04/26/2021     Lab Results   Component Value Date    MCH 29.3 11/18/2024    MCH 29.8 04/26/2021     Lab Results   Component Value Date    MCHC 34.7 11/18/2024    MCHC 32.8 04/26/2021     Lab Results   Component Value Date    RDW 12.1 11/18/2024    RDW 12.7 04/26/2021     Lab Results   Component Value Date     11/18/2024     04/26/2021       Chem8  Liver Function Studies -   Recent Labs   Lab Test 11/18/24  0858   PROTTOTAL 7.4   ALBUMIN 4.1   BILITOTAL 0.8   ALKPHOS 87   AST 19   ALT 13       IMPRESSION/PLAN:  1. 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 He had stable disease on Keytruda, for TWO years,  and then s/p chemoradiation to the primary tumor.    He has been off all therapy since 2/2021. I reviewed his CT CAP scan with him and advised that we continue to monitor him off of therapy for now.   I am thrilled with how he is doing.    Return in 6 months    2. Dysphagia -   He had a repeat endoscopy fall 2022. The pathology revealed inflammation.  Overall appearance of the endoscopy was suggestive of Barretts esophagus.  We discussed continuing omeprazole.  No new symptoms.      3. He has a history of pulmonary embolus from 2018 and was treated with Xarelto.    4. Neuropathy continues to be his most bothersome complaint.   He has tried many therapies and is trying some naturopathic redemies.      5. He does have a history of afib; he follows with his pcp.     Kadi Dee MD .  "

## 2024-11-28 DIAGNOSIS — I10 HYPERTENSION, UNSPECIFIED TYPE: ICD-10-CM

## 2024-11-29 NOTE — TELEPHONE ENCOUNTER
Maxzide Refill   Last prescribing provider: Dr Dee     Last clinic visit date: 11/18/24 DR Dee     Recommendations for requested medication (if none, N/A): N/A    Any other pertinent information (if none, N/A): N/A    Refilled: Y/N, if NO, why?

## 2024-12-01 RX ORDER — TRIAMTERENE AND HYDROCHLOROTHIAZIDE 37.5; 25 MG/1; MG/1
1 TABLET ORAL DAILY
Qty: 90 TABLET | Refills: 3 | Status: SHIPPED | OUTPATIENT
Start: 2024-12-01

## 2025-02-02 NOTE — PROGRESS NOTES
Prep and teaching complete for LIVER BIOPSY; pt reports he is staying at the [facility], he does not have anyone here with him. Pt notified he would need someone with him if having sedation; IR staff notified. Awaiting consent and lab results.    02-Feb-2025 17:19

## 2025-04-02 ENCOUNTER — TELEPHONE (OUTPATIENT)
Dept: ONCOLOGY | Facility: CLINIC | Age: 65
End: 2025-04-02
Payer: COMMERCIAL

## 2025-04-02 NOTE — TELEPHONE ENCOUNTER
Oncology Note:    I received a call from Levi.  She had a SCC of the left finger.  MRI was recommended.    He and I will review the mRI to be scheduled 4/9 and then see whether additional imaging I.e. PET need to be done.    Kadi Dee MD

## 2025-06-04 ENCOUNTER — ANCILLARY PROCEDURE (OUTPATIENT)
Dept: CT IMAGING | Facility: CLINIC | Age: 65
End: 2025-06-04
Attending: INTERNAL MEDICINE
Payer: COMMERCIAL

## 2025-06-04 ENCOUNTER — LAB (OUTPATIENT)
Dept: LAB | Facility: CLINIC | Age: 65
End: 2025-06-04
Payer: COMMERCIAL

## 2025-06-04 DIAGNOSIS — R13.10 DYSPHAGIA, UNSPECIFIED TYPE: ICD-10-CM

## 2025-06-04 DIAGNOSIS — G62.9 NEUROPATHY: ICD-10-CM

## 2025-06-04 DIAGNOSIS — I10 HYPERTENSION, UNSPECIFIED TYPE: ICD-10-CM

## 2025-06-04 DIAGNOSIS — C16.9 METASTASIS FROM GASTRIC CANCER (H): ICD-10-CM

## 2025-06-04 DIAGNOSIS — C79.9 METASTASIS FROM GASTRIC CANCER (H): ICD-10-CM

## 2025-06-04 LAB
ALBUMIN SERPL BCG-MCNC: 3.6 G/DL (ref 3.5–5.2)
ALP SERPL-CCNC: 69 U/L (ref 40–150)
ALT SERPL W P-5'-P-CCNC: 10 U/L (ref 0–70)
ANION GAP SERPL CALCULATED.3IONS-SCNC: 8 MMOL/L (ref 7–15)
AST SERPL W P-5'-P-CCNC: 16 U/L (ref 0–45)
BASOPHILS # BLD AUTO: 0 10E3/UL (ref 0–0.2)
BASOPHILS NFR BLD AUTO: 1 %
BILIRUB SERPL-MCNC: 0.5 MG/DL
BUN SERPL-MCNC: 15.7 MG/DL (ref 8–23)
CALCIUM SERPL-MCNC: 8.4 MG/DL (ref 8.8–10.4)
CHLORIDE SERPL-SCNC: 106 MMOL/L (ref 98–107)
CREAT BLD-MCNC: 1.3 MG/DL (ref 0.7–1.2)
CREAT SERPL-MCNC: 1.25 MG/DL (ref 0.67–1.17)
EGFRCR SERPLBLD CKD-EPI 2021: 64 ML/MIN/1.73M2
EGFRCR SERPLBLD CKD-EPI 2021: >60 ML/MIN/1.73M2
EOSINOPHIL # BLD AUTO: 0 10E3/UL (ref 0–0.7)
EOSINOPHIL NFR BLD AUTO: 1 %
ERYTHROCYTE [DISTWIDTH] IN BLOOD BY AUTOMATED COUNT: 13.1 % (ref 10–15)
GLUCOSE SERPL-MCNC: 100 MG/DL (ref 70–99)
HCO3 SERPL-SCNC: 24 MMOL/L (ref 22–29)
HCT VFR BLD AUTO: 39.8 % (ref 40–53)
HGB BLD-MCNC: 13.7 G/DL (ref 13.3–17.7)
IMM GRANULOCYTES # BLD: 0 10E3/UL
IMM GRANULOCYTES NFR BLD: 0 %
LYMPHOCYTES # BLD AUTO: 0.6 10E3/UL (ref 0.8–5.3)
LYMPHOCYTES NFR BLD AUTO: 19 %
MCH RBC QN AUTO: 29.8 PG (ref 26.5–33)
MCHC RBC AUTO-ENTMCNC: 34.4 G/DL (ref 31.5–36.5)
MCV RBC AUTO: 87 FL (ref 78–100)
MONOCYTES # BLD AUTO: 0.2 10E3/UL (ref 0–1.3)
MONOCYTES NFR BLD AUTO: 7 %
NEUTROPHILS # BLD AUTO: 2.2 10E3/UL (ref 1.6–8.3)
NEUTROPHILS NFR BLD AUTO: 72 %
NRBC # BLD AUTO: 0 10E3/UL
NRBC BLD AUTO-RTO: 0 /100
PLATELET # BLD AUTO: 144 10E3/UL (ref 150–450)
POTASSIUM SERPL-SCNC: 4.1 MMOL/L (ref 3.4–5.3)
PROT SERPL-MCNC: 6.1 G/DL (ref 6.4–8.3)
RBC # BLD AUTO: 4.6 10E6/UL (ref 4.4–5.9)
SODIUM SERPL-SCNC: 138 MMOL/L (ref 135–145)
WBC # BLD AUTO: 3.1 10E3/UL (ref 4–11)

## 2025-06-04 PROCEDURE — 74177 CT ABD & PELVIS W/CONTRAST: CPT | Mod: GC | Performed by: STUDENT IN AN ORGANIZED HEALTH CARE EDUCATION/TRAINING PROGRAM

## 2025-06-04 PROCEDURE — 71260 CT THORAX DX C+: CPT | Mod: GC | Performed by: STUDENT IN AN ORGANIZED HEALTH CARE EDUCATION/TRAINING PROGRAM

## 2025-06-04 RX ORDER — IOPAMIDOL 755 MG/ML
149 INJECTION, SOLUTION INTRAVASCULAR ONCE
Status: COMPLETED | OUTPATIENT
Start: 2025-06-04 | End: 2025-06-04

## 2025-06-04 RX ADMIN — IOPAMIDOL 149 ML: 755 INJECTION, SOLUTION INTRAVASCULAR at 07:42

## 2025-06-04 NOTE — PROGRESS NOTES
HEMATOLOGY/ONCOLOGY PROGRESS NOTE  Jun 5, 2025    Care team: Dr Dee/Nicole Sutherland PA-C/Mansi Wetzel RN     REASON FOR VISIT: follow-up metastatic esophogeal cancer, received keytruda x 2 years, followed by concurrent chemo XRT (weekly carbo/taxol + XRT), now being monitored off all therapy. 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 His tumor is positive for cytokeratin 20, negative for P63 and CK7, and HER2 is negative. PDL1 + weakly.  He started on FOLFOX (5FU/oxaliplatin) on 5/15/2017. He had a delay in treatment from 9/5-10/2/17 due to work related injury.  He had an excellent response by imaging throughout the summer 2017.    He a mixed response by imaging in late fall 2017.  In January 2018, he was switched to taxol and cyramza - had slight progression and neuropathy and clinical trial became available.   In March 2018, he was started on the [facility] Match Clinical Trial with crizotinib.  (MET amplification) He remained on crizotinib from March - July 2018.   August 2018, he was switched back to taxol/cramza.  In October 2018, he was switched to Keytruda (PD1 overexpressor).              -April 2019, his infusion was held for three weeks, and he was treated with prednisone and Enbrel for grade 3 arthralgias. Keytruda was resumed              -May 2020 he started Actemra (5/14).  This was initially WEEKLY and then in August- moved to QOW (secondary to fatigue and concerns for low WBC).                -August 2020 moved to q6 week Keytruda (still at the 200 mg dose)              -November 2020 moved back to q3w Keytruda  CT 12/30/2020: progression of esophageal mass   1/14/21: PET showing growth of primary tumor only, discussion of radiation   Keytruda was discontinued at that time.  1/28/21: chemoradiation with weekly carbo/taxol and daily radiation.  Completed 3/3/21 received 5000  cGY     INTERVAL HISTORY:   Levi returns for follow up today. He recently underwent left middle finger excision of squamous cell carcinoma with primary closure on 4/15/2025. MRI 4/9/2025 was negative for any deep tumor involvement. Surgical scar well healed. No pain. No new changes to his health.   He reports no dysphagia.  No n/v/d/c.  He has good energy.  His biggest complaint is neuropathy.  He tried a variety of therapies including duloxetine, gabapentin and laser therapy.  More recently he has been using RPR therapy and red light therapy. He does not know if this has helped at all yet.  His right leg neuropathy somewhat improved but no change in his left leg. He is planning to continue few more sessions and stop if there's no significant improvement.     He noticed scant purulent discharge in his belly button and occasional bloody discharge. No pain, redness or tenderness. Denies fever, chills or abd pain.      ROS: 10 point ROS neg other than the symptoms noted above in the HPI.     PHYSICAL EXAMINATION  Wt Readings from Last 4 Encounters:   06/05/25 135.4 kg (298 lb 9.6 oz)   11/18/24 146.1 kg (322 lb 3.2 oz)   08/28/24 (!) 156.5 kg (345 lb)   05/06/24 (!) 157 kg (346 lb 1.6 oz)     /71 (BP Location: Right arm, Patient Position: Sitting, Cuff Size: Adult Large)   Pulse 93   Temp 97.9  F (36.6  C) (Oral)   Resp 18   Wt 135.4 kg (298 lb 9.6 oz)   SpO2 98%   BMI 34.51 kg/m    Vital signs were reviewed.   Patient alert and oriented x3.   PERRLA. EOMI. No scleral icterus noted.   Neck exam: No palpable cervical, supraclavicular or axillary nodes bilaterally.   Heart:Mildly tachy, no murmur   Lungs: clear to auscultation bilaterally.  No crackles or wheezing.   Abd: positive bowel sounds in all four quadrants.  No tenderness to palpation.  No hepatomegaly. Scant purulent discharge. No redness, warmth or tenderness. No bleeding or open wound.   Extremities: No lower extremity edema,  Neuro: grossly  "intact. Absent reflexes from knees to below.  Mood and affect is stable.       Labs:     Reviewed his labs from yesterday    CT OCAMPO 6/4/2025:  IMPRESSION:   1. No evidence for local recurrence or metastatic disease to the  chest, abdomen or pelvis.  2. Stable soft tissue thickening about the celiac access, likely post  treatment changes.  3. Grossly stable sub-6 mm pulmonary nodules scattered areas of distal  mucous plugging.  4. Unchanged multilevel compression deformities of the thoracic and  lumbar spine.         Lab Results   Component Value Date    WBC 3.3 11/18/2024    WBC 3.4 04/26/2021     Lab Results   Component Value Date    RBC 5.60 11/18/2024    RBC 4.56 04/26/2021     Lab Results   Component Value Date    HGB 16.4 11/18/2024    HGB 13.6 04/26/2021     Lab Results   Component Value Date    HCT 47.3 11/18/2024    HCT 41.5 04/26/2021     No components found for: \"MCT\"  Lab Results   Component Value Date    MCV 85 11/18/2024    MCV 91 04/26/2021     Lab Results   Component Value Date    MCH 29.3 11/18/2024    MCH 29.8 04/26/2021     Lab Results   Component Value Date    MCHC 34.7 11/18/2024    MCHC 32.8 04/26/2021     Lab Results   Component Value Date    RDW 12.1 11/18/2024    RDW 12.7 04/26/2021     Lab Results   Component Value Date     11/18/2024     04/26/2021       Chem8  Liver Function Studies -   Recent Labs   Lab Test 11/18/24  0858   PROTTOTAL 7.4   ALBUMIN 4.1   BILITOTAL 0.8   ALKPHOS 87   AST 19   ALT 13       IMPRESSION/PLAN:  1. 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 He had stable disease on Keytruda, for TWO years,  and then s/p chemoradiation to the primary tumor.    He has been off all therapy since 2/2021. I reviewed his CT CAP scan with him and advised that we continue to monitor him off of therapy for now.   I am thrilled with how he is doing.    Return in 6 months with repeat CT CAP    2. Dysphagia -   He had a repeat endoscopy fall " 2022. The pathology revealed inflammation.  Overall appearance of the endoscopy was suggestive of Barretts esophagus.  We discussed continuing omeprazole.  No new symptoms.      3. He has a history of pulmonary embolus from 2018 and was treated with Xarelto.    4. Neuropathy continues to be his most bothersome complaint.   He has tried many therapies and now on light therapy and RPR      5. He does have a history of afib; he follows with his pcp.    6. 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 Now recently underwent left middle finger excision of squamous cell carcinoma with primary closure on 4/15/2025. Continue derm follow up annually and hand surgeon as needed.     7. Umbilical infection: Mild, likely bacterial infection. Ordered Mupirocin 2% ointment TID. Instructed to keep the area clean and dry. No concern for systemic disease and recent CT scan was negative too. If does not improve or worsens, can consider oral antibiotics or antifungals.      Patient was seen and plan discussed with Dr. Leila COLEBS  Oncology Fellow     Addendum:  Pt was seen and evaluated by me with Dr Caro.  I reviewed images and labs personally and with Levi.    On physical exam, he is doing well, ambulating without difficulty.  He has lost 50 pounds! And this was applauded.  No cervical supraclavicular or axillary adenopathy  Heart regular  Lungs clear Abd soft with mild purulence at the umbilcus without surrounding edema.  No edema.  Numbness to knees bilaterally    We discussed with Levi that he has done exceptionally well with no signs or progressive disease.  Return in 6 months with labs and CT scan    Neuropathy - he is not interested in additional therapies at this time.  He is trying RPR.      SCC of the left finger third s/p excision - stable.  Will follow up with dermatology.      Kadi Dee MD     The longitudinal plan of care for the diagnosis(es)/condition(s)  as documented were addressed during this visit. Due to the added complexity in care, I will continue to support Levi in the subsequent management and with ongoing continuity of care.

## 2025-06-04 NOTE — DISCHARGE INSTRUCTIONS

## 2025-06-05 ENCOUNTER — ONCOLOGY VISIT (OUTPATIENT)
Dept: ONCOLOGY | Facility: CLINIC | Age: 65
End: 2025-06-05
Attending: INTERNAL MEDICINE
Payer: COMMERCIAL

## 2025-06-05 VITALS
SYSTOLIC BLOOD PRESSURE: 101 MMHG | DIASTOLIC BLOOD PRESSURE: 71 MMHG | OXYGEN SATURATION: 98 % | HEART RATE: 93 BPM | TEMPERATURE: 97.9 F | BODY MASS INDEX: 34.51 KG/M2 | WEIGHT: 298.6 LBS | RESPIRATION RATE: 18 BRPM

## 2025-06-05 DIAGNOSIS — L08.82 OMPHALITIS IN ADULT: Primary | ICD-10-CM

## 2025-06-05 DIAGNOSIS — C15.5 CANCER OF DISTAL THIRD OF ESOPHAGUS (H): ICD-10-CM

## 2025-06-05 PROCEDURE — G0463 HOSPITAL OUTPT CLINIC VISIT: HCPCS | Performed by: INTERNAL MEDICINE

## 2025-06-05 RX ORDER — MUPIROCIN 20 MG/G
OINTMENT TOPICAL 3 TIMES DAILY
Qty: 22 G | Refills: 0 | Status: SHIPPED
Start: 2025-06-05

## 2025-06-05 ASSESSMENT — PAIN SCALES - GENERAL: PAINLEVEL_OUTOF10: NO PAIN (0)

## 2025-06-05 NOTE — NURSING NOTE
"Oncology Rooming Note    June 5, 2025 8:52 AM   Reuben Padilla is a 64 year old male who presents for:    Chief Complaint   Patient presents with    Oncology Clinic Visit      Esophogeal Cancer     Initial Vitals: /71 (BP Location: Right arm, Patient Position: Sitting, Cuff Size: Adult Large)   Pulse 93   Temp 97.9  F (36.6  C) (Oral)   Resp 18   Wt 135.4 kg (298 lb 9.6 oz)   SpO2 98%   BMI 34.51 kg/m   Estimated body mass index is 34.51 kg/m  as calculated from the following:    Height as of 11/18/24: 1.981 m (6' 6\").    Weight as of this encounter: 135.4 kg (298 lb 9.6 oz). Body surface area is 2.73 meters squared.  No Pain (0) Comment: Data Unavailable   No LMP for male patient.  Allergies reviewed: Yes  Medications reviewed: Yes    Medications: Medication refills not needed today.  Pharmacy name entered into EPIC:    [facility] - [facility], [place] - [address] [place] [address] PHARMACY - [place], [place] - [address] [place] [address]    Frailty Screening:   Is the patient here for a new oncology consult visit in cancer care? 2. No    PHQ9:  Did this patient require a PHQ9?: No      Clinical concerns: none.      Jonathon Walker"

## 2025-06-05 NOTE — PATIENT INSTRUCTIONS
Levi,    It was nice to see you today.    I'm thrilled with your scans and how well you are doing.    We will see you back in 6 months with new labs and CT scan.      Kadi Dee MD

## 2025-06-05 NOTE — LETTER
6/5/2025      Reuben Padilla  [id number]  [place] [address]      Dear Colleague,    Thank you for referring your patient, Reuben Padilla, to the [facility]. Please see a copy of my visit note below.      HEMATOLOGY/ONCOLOGY PROGRESS NOTE  Jun 5, 2025    Care team: Dr Dee/Nicole Sutherland PA-C/Mansi Wetzel RN     REASON FOR VISIT: follow-up metastatic esophogeal cancer, received keytruda x 2 years, followed by concurrent chemo XRT (weekly carbo/taxol + XRT), now being monitored off all therapy. 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 His tumor is positive for cytokeratin 20, negative for P63 and CK7, and HER2 is negative. PDL1 + weakly.  He started on FOLFOX (5FU/oxaliplatin) on 5/15/2017. He had a delay in treatment from 9/5-10/2/17 due to work related injury.  He had an excellent response by imaging throughout the summer 2017.    He a mixed response by imaging in late fall 2017.  In January 2018, he was switched to taxol and cyramza - had slight progression and neuropathy and clinical trial became available.   In March 2018, he was started on the [facility] Match Clinical Trial with crizotinib.  (MET amplification) He remained on crizotinib from March - July 2018.   August 2018, he was switched back to taxol/cramza.  In October 2018, he was switched to Keytruda (PD1 overexpressor).              -April 2019, his infusion was held for three weeks, and he was treated with prednisone and Enbrel for grade 3 arthralgias. Keytruda was resumed              -May 2020 he started Actemra (5/14).  This was initially WEEKLY and then in August- moved to QOW (secondary to fatigue and concerns for low WBC).                -August 2020 moved to q6 week Keytruda (still at the 200 mg dose)              -November 2020 moved back to q3w Keytruda  CT 12/30/2020: progression of  esophageal mass   1/14/21: PET showing growth of primary tumor only, discussion of radiation   Keytruda was discontinued at that time.  1/28/21: chemoradiation with weekly carbo/taxol and daily radiation.  Completed 3/3/21 received 5000 cGY     INTERVAL HISTORY:   Levi returns for follow up today. He recently underwent left middle finger excision of squamous cell carcinoma with primary closure on 4/15/2025. MRI 4/9/2025 was negative for any deep tumor involvement. Surgical scar well healed. No pain. No new changes to his health.   He reports no dysphagia.  No n/v/d/c.  He has good energy.  His biggest complaint is neuropathy.  He tried a variety of therapies including duloxetine, gabapentin and laser therapy.  More recently he has been using RPR therapy and red light therapy. He does not know if this has helped at all yet.  His right leg neuropathy somewhat improved but no change in his left leg. He is planning to continue few more sessions and stop if there's no significant improvement.     He noticed scant purulent discharge in his belly button and occasional bloody discharge. No pain, redness or tenderness. Denies fever, chills or abd pain.      ROS: 10 point ROS neg other than the symptoms noted above in the HPI.     PHYSICAL EXAMINATION  Wt Readings from Last 4 Encounters:   06/05/25 135.4 kg (298 lb 9.6 oz)   11/18/24 146.1 kg (322 lb 3.2 oz)   08/28/24 (!) 156.5 kg (345 lb)   05/06/24 (!) 157 kg (346 lb 1.6 oz)     /71 (BP Location: Right arm, Patient Position: Sitting, Cuff Size: Adult Large)   Pulse 93   Temp 97.9  F (36.6  C) (Oral)   Resp 18   Wt 135.4 kg (298 lb 9.6 oz)   SpO2 98%   BMI 34.51 kg/m    Vital signs were reviewed.   Patient alert and oriented x3.   PERRLA. EOMI. No scleral icterus noted.   Neck exam: No palpable cervical, supraclavicular or axillary nodes bilaterally.   Heart:Mildly tachy, no murmur   Lungs: clear to auscultation bilaterally.  No crackles or wheezing.   Abd:  "positive bowel sounds in all four quadrants.  No tenderness to palpation.  No hepatomegaly. Scant purulent discharge. No redness, warmth or tenderness. No bleeding or open wound.   Extremities: No lower extremity edema,  Neuro: grossly intact. Absent reflexes from knees to below.  Mood and affect is stable.       Labs:     Reviewed his labs from yesterday    CT OCAMPO 6/4/2025:  IMPRESSION:   1. No evidence for local recurrence or metastatic disease to the  chest, abdomen or pelvis.  2. Stable soft tissue thickening about the celiac access, likely post  treatment changes.  3. Grossly stable sub-6 mm pulmonary nodules scattered areas of distal  mucous plugging.  4. Unchanged multilevel compression deformities of the thoracic and  lumbar spine.         Lab Results   Component Value Date    WBC 3.3 11/18/2024    WBC 3.4 04/26/2021     Lab Results   Component Value Date    RBC 5.60 11/18/2024    RBC 4.56 04/26/2021     Lab Results   Component Value Date    HGB 16.4 11/18/2024    HGB 13.6 04/26/2021     Lab Results   Component Value Date    HCT 47.3 11/18/2024    HCT 41.5 04/26/2021     No components found for: \"MCT\"  Lab Results   Component Value Date    MCV 85 11/18/2024    MCV 91 04/26/2021     Lab Results   Component Value Date    MCH 29.3 11/18/2024    MCH 29.8 04/26/2021     Lab Results   Component Value Date    MCHC 34.7 11/18/2024    MCHC 32.8 04/26/2021     Lab Results   Component Value Date    RDW 12.1 11/18/2024    RDW 12.7 04/26/2021     Lab Results   Component Value Date     11/18/2024     04/26/2021       Chem8  Liver Function Studies -   Recent Labs   Lab Test 11/18/24  0858   PROTTOTAL 7.4   ALBUMIN 4.1   BILITOTAL 0.8   ALKPHOS 87   AST 19   ALT 13       IMPRESSION/PLAN:  1. 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 He had stable disease on Keytruda, for TWO years,  and then s/p chemoradiation to the primary tumor.    He has been off all therapy since 2/2021. I " reviewed his CT CAP scan with him and advised that we continue to monitor him off of therapy for now.   I am thrilled with how he is doing.    Return in 6 months with repeat CT CAP    2. Dysphagia -   He had a repeat endoscopy fall 2022. The pathology revealed inflammation.  Overall appearance of the endoscopy was suggestive of Barretts esophagus.  We discussed continuing omeprazole.  No new symptoms.      3. He has a history of pulmonary embolus from 2018 and was treated with Xarelto.    4. Neuropathy continues to be his most bothersome complaint.   He has tried many therapies and now on light therapy and RPR      5. He does have a history of afib; he follows with his pcp.    6. 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 Now recently underwent left middle finger excision of squamous cell carcinoma with primary closure on 4/15/2025. Continue derm follow up annually and hand surgeon as needed.     7. Umbilical infection: Mild, likely bacterial infection. Ordered Mupirocin 2% ointment TID. Instructed to keep the area clean and dry. No concern for systemic disease and recent CT scan was negative too. If does not improve or worsens, can consider oral antibiotics or antifungals.      Patient was seen and plan discussed with Dr. Leila Caro MBBS  Oncology Fellow     Addendum:  Pt was seen and evaluated by me with Dr Caro.  I reviewed images and labs personally and with Levi.    On physical exam, he is doing well, ambulating without difficulty.  He has lost 50 pounds! And this was applauded.  No cervical supraclavicular or axillary adenopathy  Heart regular  Lungs clear Abd soft with mild purulence at the umbilcus without surrounding edema.  No edema.  Numbness to knees bilaterally    We discussed with Levi that he has done exceptionally well with no signs or progressive disease.  Return in 6 months with labs and CT scan    Neuropathy - he is not interested in  additional therapies at this time.  He is trying RPR.      SCC of the left finger third s/p excision - stable.  Will follow up with dermatology.      Kadi Dee MD     The longitudinal plan of care for the diagnosis(es)/condition(s) as documented were addressed during this visit. Due to the added complexity in care, I will continue to support Levi in the subsequent management and with ongoing continuity of care.    Again, thank you for allowing me to participate in the care of your patient.        Sincerely,        Kadi Dee MD    Electronically signed

## 2025-07-01 DIAGNOSIS — L08.82 OMPHALITIS IN ADULT: ICD-10-CM

## 2025-07-01 RX ORDER — MUPIROCIN 2 %
OINTMENT (GRAM) TOPICAL 3 TIMES DAILY
Qty: 22 G | Refills: 0 | Status: SHIPPED | OUTPATIENT
Start: 2025-07-01

## 2025-07-01 NOTE — TELEPHONE ENCOUNTER
Pt states when he was in clinic last, the provider was going to send mupirocin cream to the pharmacy, but it never made it there.   He is requesting cream be sent to [facility] in [place] instead.

## 2025-08-16 ENCOUNTER — HEALTH MAINTENANCE LETTER (OUTPATIENT)
Age: 65
End: 2025-08-16

## 2025-08-20 ENCOUNTER — PATIENT OUTREACH (OUTPATIENT)
Dept: ONCOLOGY | Facility: CLINIC | Age: 65
End: 2025-08-20
Payer: COMMERCIAL

## (undated) DEVICE — ENDO BITE BLOCK ADULT OMNI-BLOC

## (undated) DEVICE — COVER ULTRASOUND PROBE W/GEL FLEXI-FEEL 6"X58" LF  25-FF658

## (undated) DEVICE — KIT ENDO TURNOVER/PROCEDURE W/CLEAN A SCOPE LINERS 103888

## (undated) DEVICE — SUCTION MANIFOLD NEPTUNE 2 SYS 1 PORT 702-025-000

## (undated) DEVICE — KNIFE HANDLE W/15 BLADE 371615

## (undated) DEVICE — ENDO FORCEP BX CAPTURA PRO SPIKE G50696

## (undated) DEVICE — SOL WATER IRRIG 1000ML BOTTLE 2F7114

## (undated) DEVICE — SUCTION CATH AIRLIFE TRI-FLO W/CONTROL PORT 14FR  T60C

## (undated) DEVICE — GLOVE PROTEXIS POWDER FREE SMT 7.5  2D72PT75X

## (undated) DEVICE — GLOVE PROTEXIS BLUE W/NEU-THERA 8.0  2D73EB80

## (undated) DEVICE — LINEN TOWEL PACK X5 5464

## (undated) DEVICE — SUCTION MANIFOLD DORNOCH ULTRA CART UL-CL500

## (undated) DEVICE — KIT INTRODUCER FLUENT MICRO 5FRX10CM ECHO TIP KIT-038-04

## (undated) DEVICE — SOL WATER IRRIG 500ML BOTTLE 2F7113

## (undated) DEVICE — TUBING SUCTION 12"X1/4" N612

## (undated) DEVICE — ENDO CAP AND TUBING STERILE FOR ENDOGATOR  100130

## (undated) DEVICE — BAG URINARY DRAIN LUBRISIL IC 4000ML LF 253509A

## (undated) DEVICE — KIT ENDO TURNOVER/PROCEDURE CARRY-ON 101822

## (undated) DEVICE — KIT CONNECTOR FOR OLYMPUS ENDOSCOPES DEFENDO 100310

## (undated) DEVICE — DECANTER BAG 2002S

## (undated) DEVICE — GOWN XLG DISP 9545

## (undated) DEVICE — Device

## (undated) DEVICE — SYR 30ML SLIP TIP W/O NDL 302833

## (undated) DEVICE — GLOVE PROTEXIS POWDER FREE SMT 7.0  2D72PT70X

## (undated) DEVICE — SUTURE

## (undated) DEVICE — GLOVE EXAM NITRILE LG PF LATEX FREE 5064

## (undated) DEVICE — LINEN GOWN XLG 5407

## (undated) DEVICE — SOL NACL 0.9% INJ 250ML BAG 2B1322Q

## (undated) DEVICE — KIT ENDO FIRST STEP DISINFECTANT 200ML W/POUCH EP-4

## (undated) DEVICE — TAPE CLOTH 3" CARDINAL 3TRCL03

## (undated) DEVICE — TUBE GASTROSTOMY PONSKY PULL DELUXE 20FR 000792

## (undated) DEVICE — SPECIMEN CONTAINER 3OZ W/FORMALIN 59901

## (undated) DEVICE — PAD CHUX UNDERPAD 23X24" 7136

## (undated) DEVICE — ENDO SYSTEM WATER BOTTLE & TUBING W/CO2 FILTER 00711549

## (undated) DEVICE — SU VICRYL 3-0 SH 27" J316H

## (undated) DEVICE — LINEN TOWEL PACK X6 WHITE 5487

## (undated) DEVICE — SU DERMABOND ADVANCED .7ML DNX12

## (undated) DEVICE — PACK CENTRAL LINE INSERTION SAN32CLFCG

## (undated) DEVICE — PACK ENDOSCOPY GI CUSTOM UMMC

## (undated) DEVICE — ENDO FORCEP ENDOJAW BIOPSY 2.8MMX230CM FB-220U

## (undated) DEVICE — ENDO DEVICE LOCKING AND BIOPSY CAP M00545261

## (undated) DEVICE — SU VICRYL 0 TIE 12X18" J906G

## (undated) DEVICE — DEVICE SUTURE GRASPER TROCAR CLOSURE 14GA PMITCSG

## (undated) DEVICE — ADH SKIN CLOSURE PREMIERPRO EXOFIN 1.0ML 3470

## (undated) DEVICE — TUBING SUCTION 10'X3/16" N510

## (undated) DEVICE — PREP CHLORAPREP W/ORANGE TINT 10.5ML 260715

## (undated) DEVICE — GLOVE PROTEXIS MICRO 6.0  2D73PM60

## (undated) DEVICE — GOWN IMPERVIOUS 2XL BLUE

## (undated) DEVICE — GOWN IMPERVIOUS BREATHABLE SMART XLG 89045

## (undated) DEVICE — SU MONOCRYL 4-0 P-3 18" UND Y494G

## (undated) DEVICE — ENDO TUBING CO2 SMARTCAP STERILE DISP 100145CO2EXT

## (undated) RX ORDER — HEPARIN SODIUM (PORCINE) LOCK FLUSH IV SOLN 100 UNIT/ML 100 UNIT/ML
SOLUTION INTRAVENOUS
Status: DISPENSED
Start: 2021-11-10

## (undated) RX ORDER — SODIUM CHLORIDE 9 MG/ML
INJECTION, SOLUTION INTRAVENOUS
Status: DISPENSED
Start: 2017-05-02

## (undated) RX ORDER — HEPARIN SODIUM (PORCINE) LOCK FLUSH IV SOLN 100 UNIT/ML 100 UNIT/ML
SOLUTION INTRAVENOUS
Status: DISPENSED
Start: 2018-05-22

## (undated) RX ORDER — LIDOCAINE HYDROCHLORIDE 20 MG/ML
INJECTION, SOLUTION EPIDURAL; INFILTRATION; INTRACAUDAL; PERINEURAL
Status: DISPENSED
Start: 2018-11-05

## (undated) RX ORDER — HEPARIN SODIUM (PORCINE) LOCK FLUSH IV SOLN 100 UNIT/ML 100 UNIT/ML
SOLUTION INTRAVENOUS
Status: DISPENSED
Start: 2018-07-17

## (undated) RX ORDER — FENTANYL CITRATE 50 UG/ML
INJECTION, SOLUTION INTRAMUSCULAR; INTRAVENOUS
Status: DISPENSED
Start: 2017-05-02

## (undated) RX ORDER — FENTANYL CITRATE 50 UG/ML
INJECTION, SOLUTION INTRAMUSCULAR; INTRAVENOUS
Status: DISPENSED
Start: 2021-08-12

## (undated) RX ORDER — FENTANYL CITRATE 50 UG/ML
INJECTION, SOLUTION INTRAMUSCULAR; INTRAVENOUS
Status: DISPENSED
Start: 2023-09-20

## (undated) RX ORDER — SODIUM CHLORIDE 9 MG/ML
INJECTION, SOLUTION INTRAVENOUS
Status: DISPENSED
Start: 2017-12-27

## (undated) RX ORDER — HEPARIN SODIUM (PORCINE) LOCK FLUSH IV SOLN 100 UNIT/ML 100 UNIT/ML
SOLUTION INTRAVENOUS
Status: DISPENSED
Start: 2022-10-06

## (undated) RX ORDER — TRIAMCINOLONE ACETONIDE 40 MG/ML
INJECTION, SUSPENSION INTRA-ARTICULAR; INTRAMUSCULAR
Status: DISPENSED
Start: 2019-03-19

## (undated) RX ORDER — GLYCOPYRROLATE 0.2 MG/ML
INJECTION, SOLUTION INTRAMUSCULAR; INTRAVENOUS
Status: DISPENSED
Start: 2018-11-05

## (undated) RX ORDER — DIPHENHYDRAMINE HYDROCHLORIDE 50 MG/ML
INJECTION INTRAMUSCULAR; INTRAVENOUS
Status: DISPENSED
Start: 2021-08-12

## (undated) RX ORDER — DEXAMETHASONE SODIUM PHOSPHATE 4 MG/ML
INJECTION, SOLUTION INTRA-ARTICULAR; INTRALESIONAL; INTRAMUSCULAR; INTRAVENOUS; SOFT TISSUE
Status: DISPENSED
Start: 2018-11-05

## (undated) RX ORDER — HEPARIN SODIUM (PORCINE) LOCK FLUSH IV SOLN 100 UNIT/ML 100 UNIT/ML
SOLUTION INTRAVENOUS
Status: DISPENSED
Start: 2020-02-20

## (undated) RX ORDER — HEPARIN SODIUM (PORCINE) LOCK FLUSH IV SOLN 100 UNIT/ML 100 UNIT/ML
SOLUTION INTRAVENOUS
Status: DISPENSED
Start: 2017-12-27

## (undated) RX ORDER — HEPARIN SODIUM (PORCINE) LOCK FLUSH IV SOLN 100 UNIT/ML 100 UNIT/ML
SOLUTION INTRAVENOUS
Status: DISPENSED
Start: 2020-11-02

## (undated) RX ORDER — LIDOCAINE HYDROCHLORIDE 10 MG/ML
INJECTION, SOLUTION EPIDURAL; INFILTRATION; INTRACAUDAL; PERINEURAL
Status: DISPENSED
Start: 2017-12-27

## (undated) RX ORDER — OXYCODONE HYDROCHLORIDE 5 MG/1
TABLET ORAL
Status: DISPENSED
Start: 2018-11-05

## (undated) RX ORDER — HYDRALAZINE HYDROCHLORIDE 20 MG/ML
INJECTION INTRAMUSCULAR; INTRAVENOUS
Status: DISPENSED
Start: 2018-10-10

## (undated) RX ORDER — HEPARIN SODIUM (PORCINE) LOCK FLUSH IV SOLN 100 UNIT/ML 100 UNIT/ML
SOLUTION INTRAVENOUS
Status: DISPENSED
Start: 2019-02-28

## (undated) RX ORDER — HEPARIN SODIUM (PORCINE) LOCK FLUSH IV SOLN 100 UNIT/ML 100 UNIT/ML
SOLUTION INTRAVENOUS
Status: DISPENSED
Start: 2021-08-12

## (undated) RX ORDER — HEPARIN SODIUM (PORCINE) LOCK FLUSH IV SOLN 100 UNIT/ML 100 UNIT/ML
SOLUTION INTRAVENOUS
Status: DISPENSED
Start: 2018-09-04

## (undated) RX ORDER — CLINDAMYCIN PHOSPHATE 900 MG/50ML
INJECTION, SOLUTION INTRAVENOUS
Status: DISPENSED
Start: 2018-11-05

## (undated) RX ORDER — ONDANSETRON 2 MG/ML
INJECTION INTRAMUSCULAR; INTRAVENOUS
Status: DISPENSED
Start: 2021-11-10

## (undated) RX ORDER — FENTANYL CITRATE 0.05 MG/ML
INJECTION, SOLUTION INTRAMUSCULAR; INTRAVENOUS
Status: DISPENSED
Start: 2022-10-05

## (undated) RX ORDER — ONDANSETRON 2 MG/ML
INJECTION INTRAMUSCULAR; INTRAVENOUS
Status: DISPENSED
Start: 2017-05-02

## (undated) RX ORDER — DIPHENHYDRAMINE HYDROCHLORIDE 50 MG/ML
INJECTION INTRAMUSCULAR; INTRAVENOUS
Status: DISPENSED
Start: 2021-11-10

## (undated) RX ORDER — HEPARIN SODIUM (PORCINE) LOCK FLUSH IV SOLN 100 UNIT/ML 100 UNIT/ML
SOLUTION INTRAVENOUS
Status: DISPENSED
Start: 2018-12-17

## (undated) RX ORDER — HEPARIN SODIUM (PORCINE) LOCK FLUSH IV SOLN 100 UNIT/ML 100 UNIT/ML
SOLUTION INTRAVENOUS
Status: DISPENSED
Start: 2022-11-16

## (undated) RX ORDER — FENTANYL CITRATE 50 UG/ML
INJECTION, SOLUTION INTRAMUSCULAR; INTRAVENOUS
Status: DISPENSED
Start: 2018-10-10

## (undated) RX ORDER — FENTANYL CITRATE 50 UG/ML
INJECTION, SOLUTION INTRAMUSCULAR; INTRAVENOUS
Status: DISPENSED
Start: 2022-11-16

## (undated) RX ORDER — FENTANYL CITRATE 50 UG/ML
INJECTION, SOLUTION INTRAMUSCULAR; INTRAVENOUS
Status: DISPENSED
Start: 2021-11-10

## (undated) RX ORDER — HEPARIN SODIUM (PORCINE) LOCK FLUSH IV SOLN 100 UNIT/ML 100 UNIT/ML
SOLUTION INTRAVENOUS
Status: DISPENSED
Start: 2023-05-01

## (undated) RX ORDER — HEPARIN SODIUM (PORCINE) LOCK FLUSH IV SOLN 100 UNIT/ML 100 UNIT/ML
SOLUTION INTRAVENOUS
Status: DISPENSED
Start: 2020-08-10

## (undated) RX ORDER — FENTANYL CITRATE 50 UG/ML
INJECTION, SOLUTION INTRAMUSCULAR; INTRAVENOUS
Status: DISPENSED
Start: 2018-11-05

## (undated) RX ORDER — HEPARIN SODIUM (PORCINE) LOCK FLUSH IV SOLN 100 UNIT/ML 100 UNIT/ML
SOLUTION INTRAVENOUS
Status: DISPENSED
Start: 2020-12-30

## (undated) RX ORDER — LIDOCAINE HYDROCHLORIDE 10 MG/ML
INJECTION, SOLUTION EPIDURAL; INFILTRATION; INTRACAUDAL; PERINEURAL
Status: DISPENSED
Start: 2019-03-19

## (undated) RX ORDER — HEPARIN SODIUM (PORCINE) LOCK FLUSH IV SOLN 100 UNIT/ML 100 UNIT/ML
SOLUTION INTRAVENOUS
Status: DISPENSED
Start: 2019-08-13

## (undated) RX ORDER — ONDANSETRON 2 MG/ML
INJECTION INTRAMUSCULAR; INTRAVENOUS
Status: DISPENSED
Start: 2018-11-05

## (undated) RX ORDER — FENTANYL CITRATE 50 UG/ML
INJECTION, SOLUTION INTRAMUSCULAR; INTRAVENOUS
Status: DISPENSED
Start: 2017-12-27

## (undated) RX ORDER — HEPARIN SODIUM (PORCINE) LOCK FLUSH IV SOLN 100 UNIT/ML 100 UNIT/ML
SOLUTION INTRAVENOUS
Status: DISPENSED
Start: 2018-03-06